# Patient Record
Sex: FEMALE | Race: WHITE | NOT HISPANIC OR LATINO | Employment: UNEMPLOYED | URBAN - METROPOLITAN AREA
[De-identification: names, ages, dates, MRNs, and addresses within clinical notes are randomized per-mention and may not be internally consistent; named-entity substitution may affect disease eponyms.]

---

## 2017-01-06 ENCOUNTER — ALLIED HEALTH/NURSE VISIT (OUTPATIENT)
Dept: FAMILY MEDICINE | Facility: OTHER | Age: 42
End: 2017-01-06
Payer: COMMERCIAL

## 2017-01-06 VITALS — SYSTOLIC BLOOD PRESSURE: 150 MMHG | DIASTOLIC BLOOD PRESSURE: 90 MMHG

## 2017-01-06 DIAGNOSIS — Z30.42 DEPO-PROVERA CONTRACEPTIVE STATUS: Primary | ICD-10-CM

## 2017-01-06 PROCEDURE — 99207 ZZC NO CHARGE NURSE ONLY: CPT

## 2017-01-06 PROCEDURE — 96372 THER/PROPH/DIAG INJ SC/IM: CPT

## 2017-01-06 RX ORDER — MEDROXYPROGESTERONE ACETATE 150 MG/ML
150 INJECTION, SUSPENSION INTRAMUSCULAR ONCE
Qty: 1 ML | Refills: 0 | OUTPATIENT
Start: 2017-01-06 | End: 2017-01-06

## 2017-01-06 NOTE — PROGRESS NOTES
BLOOD PRESSURE: 150/90    DATE OF LAST PAP or ANNUAL EXAM: No results found for this basename: pap  URINE HCG:not indicated    The following medication was given:     MEDICATION: Depo Provera 150mg  ROUTE: IM  SITE: Deltoid - Right per pt request   : [employer]  LOT #: [id number]  EXPIRATION:07/2019  NEXT INJECTION DUE: 03/24/2017-04/07/2017  Provider: Gisselle Linn Np      Verbal order for one time dose. Pt aware she needs to have follow up for further injections.       Prior to injection verified patient identity using patient's name and date of birth.  Per orders of Gisselle Linn NP, injection of Depo  given by Amy Jones. Patient instructed to remain in clinic for 20 minutes afterwards, and to report any adverse reaction to me immediately.      Amy Jones LPN

## 2017-02-01 NOTE — PROGRESS NOTES
SUBJECTIVE:                                                    Radha Beckwith is a 41 year old female who presents to clinic today for the following health issues:      Amitriptyline  Can't fall asleep, and if she does she can't stay asleep    Pt reports a lot of trouble with sleep.  She's having lots of anxiety and stress.    She's been changed from trazodone to amitriptyline.  The had dose increased.  Has had some scary dreams with this.  Doesn't feel like it's helping her to sleep, up frequently.  Tends to wake up every 30-60 minutes.  If she finally gets to sleep around 4 AM, will sleep to 10 AM.    Tries to go to bed between 9 and 10 PM.  Has some tomato and cucumber before going to bed.  Goes to bed with the TV on for 10-15 minutes.  Will then shut it off.  Denies using phone or laptop.  Denies doing other activities in bed.  Will then lie awake in bed most of the night.    Has tried melatonin, benadryl. Didn't feel these were very helpful.      Takes Reglan and omeprazole every morning.        Problem list and histories reviewed & adjusted, as indicated.  Additional history: as documented    Current Outpatient Prescriptions   Medication Sig Dispense Refill     ondansetron (ZOFRAN) 4 MG tablet Take 1 tablet (4 mg) by mouth every 6 hours as needed for nausea or vomiting 18 tablet 1     metoclopramide (REGLAN) 10 MG tablet TAKE ONE TABLET BY MOUTH THREE TIMES A DAY AS NEEDED 90 tablet 2     loratadine (CLARITIN) 10 MG tablet Take 10 mg by mouth daily       omeprazole 20 MG tablet Take 1 tablet (20 mg) by mouth 2 times daily Take 30-60 minutes before a meal. 60 tablet 10     ALPRAZolam (XANAX) 0.5 MG tablet   0     Recent Labs   Lab Test  10/02/15   0823  07/01/15   1807   06/23/15   1610  08/19/13   1205   ALT   --    --    --   32  41   CR  0.60  0.78   --   0.97  0.76   GFRESTIMATED  >90  Non  GFR Calc    81   --   63  85   GFRESTBLACK  >90   GFR Calc    >90    "American GFR Calc     --   76  >90   POTASSIUM  3.9  4.0   < >  2.6*  4.1    < > = values in this interval not displayed.        BP Readings from Last 3 Encounters:   02/03/17 150/84   01/06/17 150/90   11/23/16 132/78    Wt Readings from Last 3 Encounters:   02/03/17 148 lb 12.8 oz (67.495 kg)   11/23/16 144 lb 12.8 oz (65.681 kg)   10/14/16 137 lb 12.8 oz (62.506 kg)             BP elevated today.  Has been on diuretic previously that cause hypokalemia.  She took a red bull just before coming to clinic today.  Repeat BP was a bit better.        ROS:  Constitutional, HEENT, cardiovascular, pulmonary, gi and gu systems are negative, except as otherwise noted.  Lots of anxiety, poor sleep.    OBJECTIVE:                                                    /86 mmHg  Pulse 124  Temp(Src) 97.9  F (36.6  C) (Temporal)  Resp 12  Ht 5' 4\" (1.626 m)  Wt 148 lb 12.8 oz (67.495 kg)  BMI 25.53 kg/m2  Body mass index is 25.53 kg/(m^2).  GENERAL: healthy, alert and no distress  EYES: Eyes grossly normal to inspection, PERRL and conjunctivae and sclerae normal  HENT: normal cephalic/atraumatic, both ears: clear effusion, nose and mouth without ulcers or lesions, oropharynx clear and oral mucous membranes moist  NECK: no adenopathy, no asymmetry, masses, or scars and thyroid normal to palpation  RESP: lungs clear to auscultation - no rales, rhonchi or wheezes  CV: regular rate and rhythm, normal S1 S2, no S3 or S4, no murmur, click or rub, no peripheral edema and peripheral pulses strong  ABDOMEN: soft, nontender, no hepatosplenomegaly, no masses and bowel sounds normal  MS: no gross musculoskeletal defects noted, no edema    Diagnostic Test Results:  Results for orders placed or performed in visit on 08/01/16   *UA reflex to Microscopic and Culture ([facility] (except [place] and [place])   Result Value Ref Range    Color Urine Yellow     Appearance Urine Clear     Glucose Urine Negative " "NEG mg/dL    Bilirubin Urine Negative NEG    Ketones Urine Negative NEG mg/dL    Specific Gravity Urine 1.025 1.003 - 1.035    Blood Urine Negative NEG    pH Urine 6.0 5.0 - 7.0 pH    Protein Albumin Urine 30 (A) NEG mg/dL    Urobilinogen Urine 0.2 0.2 - 1.0 EU/dL    Nitrite Urine Negative NEG    Leukocyte Esterase Urine Trace (A) NEG    Source Unspecified Urine    Urine Microscopic   Result Value Ref Range    WBC Urine 10-25 (A) 0 - 2 /HPF    RBC Urine O - 2 0 - 2 /HPF    Squamous Epithelial /LPF Urine Few FEW /LPF    Bacteria Urine Few (A) NEG /HPF    Mucous Urine Present (A) NEG /LPF   Urine Culture Aerobic Bacterial   Result Value Ref Range    Specimen Description Unspecified Urine     Special Requests Specimen received in preservative     Culture Micro       >100,000 colonies/mL mixed urogenital ellis Susceptibility testing not routinely   done      Micro Report Status FINAL 08/03/2016         ASSESSMENT/PLAN:                                                      Tobacco Cessation:   reports that she has been smoking Cigarettes.  She has been smoking about 0.50 packs per day. She has never used smokeless tobacco.  Tobacco Cessation Action Plan: Pharmacotherapies : Chantix    BMI:   Estimated body mass index is 25.53 kg/(m^2) as calculated from the following:    Height as of this encounter: 5' 4\" (1.626 m).    Weight as of this encounter: 148 lb 12.8 oz (67.495 kg).   Weight management plan: Discussed healthy diet and exercise guidelines and patient will follow up in 6 months in clinic to re-evaluate.        ICD-10-CM    1. Psychophysiological insomnia F51.04 QUEtiapine (SEROQUEL) 50 MG tablet     Comprehensive metabolic panel     Lipid panel reflex to direct LDL     TSH with free T4 reflex     CANCELED: Comprehensive metabolic panel     CANCELED: Lipid Profile with reflex to direct LDL     CANCELED: TSH with free T4 reflex   2. Anxiety F41.9 QUEtiapine (SEROQUEL) 50 MG tablet     Comprehensive metabolic panel    "  Lipid panel reflex to direct LDL     TSH with free T4 reflex     CANCELED: Comprehensive metabolic panel     CANCELED: Lipid Profile with reflex to direct LDL     CANCELED: TSH with free T4 reflex   3. Tachycardia R00.0 Comprehensive metabolic panel     Lipid panel reflex to direct LDL     TSH with free T4 reflex     CANCELED: Comprehensive metabolic panel     CANCELED: Lipid Profile with reflex to direct LDL     CANCELED: TSH with free T4 reflex   4. Elevated blood pressure reading without diagnosis of hypertension R03.0 Comprehensive metabolic panel     Lipid panel reflex to direct LDL     TSH with free T4 reflex   5. Tobacco use disorder F17.200 TOBACCO CESSATION - FOR HEALTH MAINTENANCE     CANCELED: Comprehensive metabolic panel     CANCELED: Lipid Profile with reflex to direct LDL     CANCELED: TSH with free T4 reflex   6. Caffeine use F15.90      1.  Multifactorial.  Much of this is from her anxiety which isn't well-controlled.  Also stress with her mother.  Does have some poor sleep hygiene habits.  Encouraged pt to work on sleep hygiene.  In the interim, will try Seroquel as this is likely to help with both sleep an anxiety.  Follow up in 1-2 months to ensure this is improving.  2.  Not controlled.  She's not very interested in SSRIs, but will need to consider this if sleep continues to be an issue.  Will start Seroquel and consider further adjustment if not improving.  Follow up in 1-2 months.  3.  Secondary to #2 most likely as well sa #6.  Will check some labs to ensure that there's not an alternate organic cause for her symptoms.  4.  Likely secondary to caffeine and other issues noted here.  Recheck in a few weeks.  5.  Discussed options for cessation.  Pt interested in Chantix, still has some at home.  Discussed how to use it, what to watch for.  F/u in 1-2 months.  6.  See above.  Encouraged her to limit this somewhat.            Patient Instructions   Thank you for visiting [facility]  [place]    Let's try Seroquel for sleep and mood.  Let me know if problems.    When you decide to quit smoking, start taking Chantix about a week prior to stopping.  If this is working, let me know, and we'll get you a refill of the continuing pack.      We'll let you know your lab results as soon as we can.     Your blood pressure was high today.  Please come back in 1-4 weeks for a nurse visit blood pressure check.     Please make an appointment with your either myself or your provider  in 1  month for follow up on these issues.     You are also due for a pap, mammogram, etc.  Please schedule this ASAP.    If you had imaging scheduled please refer to your radiology prep sheet.    Appointment    Date_______________     Time_____________    Day:   M TU W TH F    With____________________________    Location_________________________    If you need medication refills, please contact your pharmacy 3 days before your prescriptions runs out. If you are out of refills, your pharmacy will contact contact the clinic.    Contact us or return if questions or concerns.     -Your Care Team:  MD Peyton Casarez PA-C Folake Falaki, MD Anoshirvan Mazhari, MD Kelly White, JEN    General information about your clinic      Clinic hours:     Lab hours:  Phone [phone]  Monday 7:30 am-7 pm    Monday 8:30 am-6:30 pm  Tuesday-Friday 7:30 am-5 pm   Tuesday-Friday 8:30 am-4:30 pm    Pharmacy hours:  Phone [phone]  Monday 8:30 am-7pm  Tuesday-Friday 8:30am-6 pm                                       [employer] assistance [phone]        We would like to hear from you, how was your visit today?    Angela Jacinto  Patient Information Supervisor   Patient Care Supervisor  [place] [place], and [place] [facility] [place], and [facility]  [phone]           Clemente Chino MD, MD  [facility]

## 2017-02-03 ENCOUNTER — OFFICE VISIT (OUTPATIENT)
Dept: FAMILY MEDICINE | Facility: OTHER | Age: 42
End: 2017-02-03
Payer: COMMERCIAL

## 2017-02-03 VITALS
DIASTOLIC BLOOD PRESSURE: 86 MMHG | HEART RATE: 124 BPM | BODY MASS INDEX: 25.4 KG/M2 | RESPIRATION RATE: 12 BRPM | WEIGHT: 148.8 LBS | SYSTOLIC BLOOD PRESSURE: 142 MMHG | HEIGHT: 64 IN | TEMPERATURE: 97.9 F

## 2017-02-03 DIAGNOSIS — F41.9 ANXIETY: ICD-10-CM

## 2017-02-03 DIAGNOSIS — Z78.9 CAFFEINE USE: ICD-10-CM

## 2017-02-03 DIAGNOSIS — R00.0 TACHYCARDIA: ICD-10-CM

## 2017-02-03 DIAGNOSIS — F51.04 PSYCHOPHYSIOLOGICAL INSOMNIA: Primary | ICD-10-CM

## 2017-02-03 DIAGNOSIS — F17.200 TOBACCO USE DISORDER: ICD-10-CM

## 2017-02-03 DIAGNOSIS — R03.0 ELEVATED BLOOD PRESSURE READING WITHOUT DIAGNOSIS OF HYPERTENSION: ICD-10-CM

## 2017-02-03 PROCEDURE — 99214 OFFICE O/P EST MOD 30 MIN: CPT | Performed by: FAMILY MEDICINE

## 2017-02-03 RX ORDER — QUETIAPINE FUMARATE 50 MG/1
50-100 TABLET, FILM COATED ORAL
Qty: 60 TABLET | Refills: 1 | Status: SHIPPED | OUTPATIENT
Start: 2017-02-03 | End: 2017-03-30

## 2017-02-03 RX ORDER — ALPRAZOLAM 0.5 MG
TABLET ORAL
Refills: 0 | COMMUNITY
Start: 2016-10-14 | End: 2017-03-30

## 2017-02-03 ASSESSMENT — ANXIETY QUESTIONNAIRES
1. FEELING NERVOUS, ANXIOUS, OR ON EDGE: MORE THAN HALF THE DAYS
3. WORRYING TOO MUCH ABOUT DIFFERENT THINGS: MORE THAN HALF THE DAYS
IF YOU CHECKED OFF ANY PROBLEMS ON THIS QUESTIONNAIRE, HOW DIFFICULT HAVE THESE PROBLEMS MADE IT FOR YOU TO DO YOUR WORK, TAKE CARE OF THINGS AT HOME, OR GET ALONG WITH OTHER PEOPLE: SOMEWHAT DIFFICULT
2. NOT BEING ABLE TO STOP OR CONTROL WORRYING: SEVERAL DAYS
5. BEING SO RESTLESS THAT IT IS HARD TO SIT STILL: NOT AT ALL
GAD7 TOTAL SCORE: 10
2. NOT BEING ABLE TO STOP OR CONTROL WORRYING: SEVERAL DAYS
IF YOU CHECKED OFF ANY PROBLEMS ON THIS QUESTIONNAIRE, HOW DIFFICULT HAVE THESE PROBLEMS MADE IT FOR YOU TO DO YOUR WORK, TAKE CARE OF THINGS AT HOME, OR GET ALONG WITH OTHER PEOPLE: SOMEWHAT DIFFICULT
5. BEING SO RESTLESS THAT IT IS HARD TO SIT STILL: NOT AT ALL
6. BECOMING EASILY ANNOYED OR IRRITABLE: NEARLY EVERY DAY
6. BECOMING EASILY ANNOYED OR IRRITABLE: NEARLY EVERY DAY
3. WORRYING TOO MUCH ABOUT DIFFERENT THINGS: MORE THAN HALF THE DAYS
7. FEELING AFRAID AS IF SOMETHING AWFUL MIGHT HAPPEN: SEVERAL DAYS
1. FEELING NERVOUS, ANXIOUS, OR ON EDGE: MORE THAN HALF THE DAYS
7. FEELING AFRAID AS IF SOMETHING AWFUL MIGHT HAPPEN: SEVERAL DAYS
GAD7 TOTAL SCORE: 10

## 2017-02-03 ASSESSMENT — PATIENT HEALTH QUESTIONNAIRE - PHQ9
5. POOR APPETITE OR OVEREATING: SEVERAL DAYS
5. POOR APPETITE OR OVEREATING: SEVERAL DAYS

## 2017-02-03 ASSESSMENT — PAIN SCALES - GENERAL: PAINLEVEL: NO PAIN (0)

## 2017-02-03 NOTE — NURSING NOTE
"Chief Complaint   Patient presents with     Sleep Problem     Panel Management     MyChart, Pap, Mammo, Tobacco cessation, Lipids, phq, aletha, BMP       Initial /84 mmHg  Pulse 124  Temp(Src) 97.9  F (36.6  C) (Temporal)  Resp 12 Estimated body mass index is 23.84 kg/(m^2) as calculated from the following:    Height as of 11/23/16: 5' 5.35\" (1.66 m).    Weight as of 11/23/16: 144 lb 12.8 oz (65.681 kg).  BP completed using cuff size: jacquelin Malik, LYSSA    "

## 2017-02-03 NOTE — NURSING NOTE
"Chief Complaint   Patient presents with     Sleep Problem     Panel Management     MyChart, Pap, Mammo, Tobacco cessation, Lipids, phq, aletha, BMP       Initial /84 mmHg  Pulse 124  Temp(Src) 97.9  F (36.6  C) (Temporal)  Resp 12  Ht 5' 4\" (1.626 m)  Wt 148 lb 12.8 oz (67.495 kg)  BMI 25.53 kg/m2 Estimated body mass index is 25.53 kg/(m^2) as calculated from the following:    Height as of this encounter: 5' 4\" (1.626 m).    Weight as of this encounter: 148 lb 12.8 oz (67.495 kg).  BP completed using cuff size: jacquelin Malik CMA    "

## 2017-02-03 NOTE — MR AVS SNAPSHOT
After Visit Summary   2/3/2017    Radha Beckwith    MRN: [id number]           Patient Information     Date Of Birth          1975        Visit Information        Provider Department      2/3/2017 2:30 PM Clemente Chino MD [facility]        Today's Diagnoses     Psychophysiological insomnia    -  1     Anxiety         Tachycardia         Elevated blood pressure reading without diagnosis of hypertension         Tobacco use disorder           Care Instructions    Thank you for visiting [facility]    Let's try Seroquel for sleep and mood.  Let me know if problems.    When you decide to quit smoking, start taking Chantix about a week prior to stopping.  If this is working, let me know, and we'll get you a refill of the continuing pack.      We'll let you know your lab results as soon as we can.     Your blood pressure was high today.  Please come back in 1-4 weeks for a nurse visit blood pressure check.     Please make an appointment with your either myself or your provider  in 1  month for follow up on these issues.     You are also due for a pap, mammogram, etc.  Please schedule this ASAP.    If you had imaging scheduled please refer to your radiology prep sheet.    Appointment    Date_______________     Time_____________    Day:   M TU W TH F    With____________________________    Location_________________________    If you need medication refills, please contact your pharmacy 3 days before your prescriptions runs out. If you are out of refills, your pharmacy will contact contact the clinic.    Contact us or return if questions or concerns.     -Your Care Team:  MD Peyton Casarez PA-C Folake Falaki, MD Anoshirvan Mazhari, MD Kelly White, JEN    General information about your clinic      Clinic hours:     Lab hours:  Phone [phone]  Monday 7:30 am-7 pm    Monday 8:30 am-6:30 pm  Tuesday-Friday 7:30 am-5  "pm   Tuesday-Friday 8:30 am-4:30 pm    Pharmacy hours:  Phone [phone]  Monday 8:30 am-7pm  Tuesday-Friday 8:30am-6 pm                                       [place] assistance [phone]        We would like to hear from you, how was your visit today?    Angela Jacinto  Patient Information Supervisor   Patient Care Supervisor  [facility] [place], and hospitals, and [facility]  [phone]           Follow-ups after your visit        Who to contact     If you have questions or need follow up information about today's clinic visit or your schedule please contact [facility] directly at [phone].  Normal or non-critical lab and imaging results will be communicated to you by [employer], letter or phone within 4 business days after the clinic has received the results. If you do not hear from us within 7 days, please contact the clinic through [employer]IM[employer] or phone. If you have a critical or abnormal lab result, we will notify you by phone as soon as possible.  Submit refill requests through [employer] or call your pharmacy and they will forward the refill request to us. Please allow 3 business days for your refill to be completed.          Additional Information About Your Visit        NIMBO[employer]hart Information     [employer] lets you send messages to your doctor, view your test results, renew your prescriptions, schedule appointments and more. To sign up, go to [email or web address] . Click on \"Log in\" on the left side of the screen, which will take you to the Welcome page. Then click on \"Sign up Now\" on the right side of the page.     You will be asked to enter the access code listed below, as well as some personal information. Please follow the directions to create your username and password.     Your access code is: [email or web address]  Expires: 2017  3:22 PM     Your access code will  in 90 days. If you need help or a new code, " "please call your [facility] clinic or [phone].        Care EveryWhere ID     This is your Care EveryWhere ID. This could be used by other organizations to access your [facility] medical records  [id number]        Your Vitals Were     Pulse Temperature Respirations Height BMI (Body Mass Index)       124 97.9  F (36.6  C) (Temporal) 12 5' 4\" (1.626 m) 25.53 kg/m2        Blood Pressure from Last 3 Encounters:   02/03/17 142/86   01/06/17 150/90   11/23/16 132/78    Weight from Last 3 Encounters:   02/03/17 148 lb 12.8 oz (67.495 kg)   11/23/16 144 lb 12.8 oz (65.681 kg)   10/14/16 137 lb 12.8 oz (62.506 kg)              We Performed the Following     Comprehensive metabolic panel     Lipid Profile with reflex to direct LDL     TOBACCO CESSATION - FOR HEALTH MAINTENANCE     TSH with free T4 reflex          Today's Medication Changes          These changes are accurate as of: 2/3/17  3:22 PM.  If you have any questions, ask your nurse or doctor.               Start taking these medicines.        Dose/Directions    QUEtiapine 50 MG tablet   Commonly known as:  SEROQUEL   Used for:  Anxiety, Psychophysiological insomnia   Started by:  Clemente Chino MD        Dose:   mg   Take 1-2 tablets ( mg) by mouth nightly as needed   Quantity:  60 tablet   Refills:  1         Stop taking these medicines if you haven't already. Please contact your care team if you have questions.     amitriptyline 25 MG tablet   Commonly known as:  ELAVIL   Stopped by:  Clemente Chino MD                Where to get your medicines      These medications were sent to [facility] - [place] - [address]   [address] [place] [address]     Phone:  [phone]    - QUEtiapine 50 MG tablet             Primary Care Provider Office Phone # Fax #    Donny De Anda [phone]       [facility] [address] [facility] [address]        Thank you!     Thank you " for choosing [facility]  for your care. Our goal is always to provide you with excellent care. Hearing back from our patients is one way we can continue to improve our services. Please take a few minutes to complete the written survey that you may receive in the mail after your visit with us. Thank you!             Your Updated Medication List - Protect others around you: Learn how to safely use, store and throw away your medicines at www.disposemymeds.org.          This list is accurate as of: 2/3/17  3:22 PM.  Always use your most recent med list.                   Brand Name Dispense Instructions for use    ALPRAZolam 0.5 MG tablet    XANAX         loratadine 10 MG tablet    CLARITIN     Take 10 mg by mouth daily       metoclopramide 10 MG tablet    REGLAN    90 tablet    TAKE ONE TABLET BY MOUTH THREE TIMES A DAY AS NEEDED       omeprazole 20 MG tablet     60 tablet    Take 1 tablet (20 mg) by mouth 2 times daily Take 30-60 minutes before a meal.       ondansetron 4 MG tablet    ZOFRAN    18 tablet    Take 1 tablet (4 mg) by mouth every 6 hours as needed for nausea or vomiting       QUEtiapine 50 MG tablet    SEROQUEL    60 tablet    Take 1-2 tablets ( mg) by mouth nightly as needed

## 2017-02-03 NOTE — PATIENT INSTRUCTIONS
Thank you for visiting [facility]    Let's try Seroquel for sleep and mood.  Let me know if problems.    When you decide to quit smoking, start taking Chantix about a week prior to stopping.  If this is working, let me know, and we'll get you a refill of the continuing pack.      We'll let you know your lab results as soon as we can.     Your blood pressure was high today.  Please come back in 1-4 weeks for a nurse visit blood pressure check.     Please make an appointment with your either myself or your provider  in 1  month for follow up on these issues.     You are also due for a pap, mammogram, etc.  Please schedule this ASAP.    If you had imaging scheduled please refer to your radiology prep sheet.    Appointment    Date_______________     Time_____________    Day:   M TU W TH F    With____________________________    Location_________________________    If you need medication refills, please contact your pharmacy 3 days before your prescriptions runs out. If you are out of refills, your pharmacy will contact contact the clinic.    Contact us or return if questions or concerns.     -Your Care Team:  MD Peyton Casarez PA-C Folake Falaki, MD Anoshirvan Mazhari, MD Kelly White, JEN    General information about your clinic      Clinic hours:     Lab hours:  Phone [phone]  Monday 7:30 am-7 pm    Monday 8:30 am-6:30 pm  Tuesday-Friday 7:30 am-5 pm   Tuesday-Friday 8:30 am-4:30 pm    Pharmacy hours:  Phone [phone]  Monday 8:30 am-7pm  Tuesday-Friday 8:30am-6 pm                                       [place] assistance [phone]        We would like to hear from you, how was your visit today?    Angela Jacinto  Patient Information Supervisor   Patient Care Supervisor  [facility] [place], and [facility], and [facility]  [phone]

## 2017-02-04 ASSESSMENT — ANXIETY QUESTIONNAIRES: GAD7 TOTAL SCORE: 10

## 2017-02-04 ASSESSMENT — PATIENT HEALTH QUESTIONNAIRE - PHQ9: SUM OF ALL RESPONSES TO PHQ QUESTIONS 1-9: 8

## 2017-03-13 DIAGNOSIS — F41.0 ANXIETY ATTACK: ICD-10-CM

## 2017-03-13 RX ORDER — ONDANSETRON 4 MG/1
TABLET, FILM COATED ORAL
Qty: 18 TABLET | Refills: 0 | Status: SHIPPED | OUTPATIENT
Start: 2017-03-13 | End: 2017-04-20

## 2017-03-13 NOTE — TELEPHONE ENCOUNTER
ondansetron (ZOFRAN) 4 MG tablet      Last Written Prescription Date:  12/28/16  Last Fill Quantity: 18 tablets,   # refills: 1  Last Office Visit with [facility], [facility] or [facility] prescribing provider: 2/3/17 velez  Future Office visit:       Routing refill request to provider for review/approval because:  Drug not on the [facility], [facility] or [facility] refill protocol or controlled substance

## 2017-03-14 NOTE — TELEPHONE ENCOUNTER
Informed patient and she states when she receives her work schedule next week she will call back to schedule follow up.    Lucy Paris MA

## 2017-03-14 NOTE — TELEPHONE ENCOUNTER
Pt was asked to follow up this month on her anxiety and other symptoms.  Please encourage her to make appointment.

## 2017-03-15 ENCOUNTER — TELEPHONE (OUTPATIENT)
Dept: FAMILY MEDICINE | Facility: OTHER | Age: 42
End: 2017-03-15

## 2017-03-15 NOTE — TELEPHONE ENCOUNTER
Summary:    Patient is due/failing the following:   BMP, LDL, MAMMOGRAM and PAP    Action needed:   Patient needs office visit for PAP., Patient needs fasting lab only appointment and schedule a mammogram     Type of outreach:    Phone, spoke to patient.  patient scheduled appointment in [facility] for provider review:    None                                                                                                                                    Izabela Sebastian       Chart routed to Care Team .        Panel Management Review      Patient has the following on her problem list:     Hypertension   Last three blood pressure readings:  BP Readings from Last 3 Encounters:   02/03/17 142/86   01/06/17 150/90   11/23/16 132/78     Blood pressure: FAILED    HTN Guidelines:  Age 18-59 BP range:  Less than 140/90  Age 60-85 with Diabetes:  Less than 140/90  Age 60-85 without Diabetes:  less than 150/90      Composite cancer screening  Chart review shows that this patient is due/due soon for the following Pap Smear and Mammogram

## 2017-03-16 ENCOUNTER — TELEPHONE (OUTPATIENT)
Dept: FAMILY MEDICINE | Facility: OTHER | Age: 42
End: 2017-03-16

## 2017-03-16 NOTE — TELEPHONE ENCOUNTER
Please change medication or advise of any information that might help get this approved.    Thanks    Received PA request for: omeprazole bid   Insurance co: [employer]  Insurance co phone: [phone]  Member ID#: [id number]    PA filled out and faxed awaiting approval/denial

## 2017-03-24 ENCOUNTER — APPOINTMENT (OUTPATIENT)
Dept: ULTRASOUND IMAGING | Facility: CLINIC | Age: 42
End: 2017-03-24
Attending: FAMILY MEDICINE
Payer: COMMERCIAL

## 2017-03-24 ENCOUNTER — APPOINTMENT (OUTPATIENT)
Dept: CT IMAGING | Facility: CLINIC | Age: 42
End: 2017-03-24
Attending: FAMILY MEDICINE
Payer: COMMERCIAL

## 2017-03-24 ENCOUNTER — HOSPITAL ENCOUNTER (EMERGENCY)
Facility: CLINIC | Age: 42
Discharge: HOME | End: 2017-03-24
Attending: FAMILY MEDICINE | Admitting: FAMILY MEDICINE
Payer: COMMERCIAL

## 2017-03-24 VITALS
RESPIRATION RATE: 16 BRPM | BODY MASS INDEX: 25.54 KG/M2 | SYSTOLIC BLOOD PRESSURE: 121 MMHG | WEIGHT: 148.81 LBS | TEMPERATURE: 98.5 F | DIASTOLIC BLOOD PRESSURE: 76 MMHG | OXYGEN SATURATION: 97 %

## 2017-03-24 DIAGNOSIS — F43.22 ADJUSTMENT DISORDER WITH ANXIOUS MOOD: ICD-10-CM

## 2017-03-24 DIAGNOSIS — K21.9 GASTROESOPHAGEAL REFLUX DISEASE WITHOUT ESOPHAGITIS: ICD-10-CM

## 2017-03-24 LAB
ALBUMIN SERPL-MCNC: 3.6 G/DL (ref 3.4–5)
ALP SERPL-CCNC: 67 U/L (ref 40–150)
ALT SERPL W P-5'-P-CCNC: 35 U/L (ref 0–50)
ANION GAP SERPL CALCULATED.3IONS-SCNC: 10 MMOL/L (ref 3–14)
APTT PPP: 24 SEC (ref 22–37)
AST SERPL W P-5'-P-CCNC: 22 U/L (ref 0–45)
BASOPHILS # BLD AUTO: 0 10E9/L (ref 0–0.2)
BASOPHILS NFR BLD AUTO: 0.4 %
BILIRUB DIRECT SERPL-MCNC: <0.1 MG/DL (ref 0–0.2)
BILIRUB SERPL-MCNC: 0.3 MG/DL (ref 0.2–1.3)
BUN SERPL-MCNC: 8 MG/DL (ref 7–30)
CALCIUM SERPL-MCNC: 8.9 MG/DL (ref 8.5–10.1)
CHLORIDE SERPL-SCNC: 110 MMOL/L (ref 94–109)
CO2 SERPL-SCNC: 24 MMOL/L (ref 20–32)
CREAT SERPL-MCNC: 0.68 MG/DL (ref 0.52–1.04)
DIFFERENTIAL METHOD BLD: NORMAL
EOSINOPHIL # BLD AUTO: 0.2 10E9/L (ref 0–0.7)
EOSINOPHIL NFR BLD AUTO: 2.2 %
ERYTHROCYTE [DISTWIDTH] IN BLOOD BY AUTOMATED COUNT: 12.7 % (ref 10–15)
ETHANOL SERPL-MCNC: <0.01 G/DL
GFR SERPL CREATININE-BSD FRML MDRD: ABNORMAL ML/MIN/1.7M2
GLUCOSE SERPL-MCNC: 127 MG/DL (ref 70–99)
HCT VFR BLD AUTO: 42.9 % (ref 35–47)
HGB BLD-MCNC: 14.5 G/DL (ref 11.7–15.7)
IMM GRANULOCYTES # BLD: 0 10E9/L (ref 0–0.4)
IMM GRANULOCYTES NFR BLD: 0.3 %
INR PPP: 0.87 (ref 0.86–1.14)
LIPASE SERPL-CCNC: 143 U/L (ref 73–393)
LYMPHOCYTES # BLD AUTO: 1.9 10E9/L (ref 0.8–5.3)
LYMPHOCYTES NFR BLD AUTO: 28.4 %
MCH RBC QN AUTO: 32.2 PG (ref 26.5–33)
MCHC RBC AUTO-ENTMCNC: 33.8 G/DL (ref 31.5–36.5)
MCV RBC AUTO: 95 FL (ref 78–100)
MONOCYTES # BLD AUTO: 0.4 10E9/L (ref 0–1.3)
MONOCYTES NFR BLD AUTO: 5.8 %
NEUTROPHILS # BLD AUTO: 4.3 10E9/L (ref 1.6–8.3)
NEUTROPHILS NFR BLD AUTO: 62.9 %
PLATELET # BLD AUTO: 226 10E9/L (ref 150–450)
POTASSIUM SERPL-SCNC: 3.8 MMOL/L (ref 3.4–5.3)
PROT SERPL-MCNC: 7.4 G/DL (ref 6.8–8.8)
RBC # BLD AUTO: 4.51 10E12/L (ref 3.8–5.2)
SODIUM SERPL-SCNC: 144 MMOL/L (ref 133–144)
TROPONIN I SERPL-MCNC: NORMAL UG/L (ref 0–0.04)
WBC # BLD AUTO: 6.8 10E9/L (ref 4–11)

## 2017-03-24 PROCEDURE — 93005 ELECTROCARDIOGRAM TRACING: CPT

## 2017-03-24 PROCEDURE — 85610 PROTHROMBIN TIME: CPT | Performed by: FAMILY MEDICINE

## 2017-03-24 PROCEDURE — 80320 DRUG SCREEN QUANTALCOHOLS: CPT | Performed by: FAMILY MEDICINE

## 2017-03-24 PROCEDURE — 99284 EMERGENCY DEPT VISIT MOD MDM: CPT | Performed by: FAMILY MEDICINE

## 2017-03-24 PROCEDURE — 99285 EMERGENCY DEPT VISIT HI MDM: CPT | Mod: 25

## 2017-03-24 PROCEDURE — 76705 ECHO EXAM OF ABDOMEN: CPT

## 2017-03-24 PROCEDURE — 83690 ASSAY OF LIPASE: CPT | Performed by: FAMILY MEDICINE

## 2017-03-24 PROCEDURE — 96360 HYDRATION IV INFUSION INIT: CPT

## 2017-03-24 PROCEDURE — 80076 HEPATIC FUNCTION PANEL: CPT | Performed by: FAMILY MEDICINE

## 2017-03-24 PROCEDURE — 25000125 ZZHC RX 250: Performed by: FAMILY MEDICINE

## 2017-03-24 PROCEDURE — 25000132 ZZH RX MED GY IP 250 OP 250 PS 637: Performed by: FAMILY MEDICINE

## 2017-03-24 PROCEDURE — 74177 CT ABD & PELVIS W/CONTRAST: CPT

## 2017-03-24 PROCEDURE — 85730 THROMBOPLASTIN TIME PARTIAL: CPT | Performed by: FAMILY MEDICINE

## 2017-03-24 PROCEDURE — 80048 BASIC METABOLIC PNL TOTAL CA: CPT | Performed by: FAMILY MEDICINE

## 2017-03-24 PROCEDURE — 25000128 H RX IP 250 OP 636: Performed by: FAMILY MEDICINE

## 2017-03-24 PROCEDURE — 84484 ASSAY OF TROPONIN QUANT: CPT | Performed by: FAMILY MEDICINE

## 2017-03-24 PROCEDURE — 25500064 ZZH RX 255 OP 636: Performed by: FAMILY MEDICINE

## 2017-03-24 PROCEDURE — 85025 COMPLETE CBC W/AUTO DIFF WBC: CPT | Performed by: FAMILY MEDICINE

## 2017-03-24 RX ORDER — SODIUM CHLORIDE 9 MG/ML
1000 INJECTION, SOLUTION INTRAVENOUS CONTINUOUS
Status: DISCONTINUED | OUTPATIENT
Start: 2017-03-24 | End: 2017-03-24 | Stop reason: HOSPADM

## 2017-03-24 RX ORDER — SUCRALFATE 1 G/1
1 TABLET ORAL 4 TIMES DAILY
Qty: 40 TABLET | Refills: 1 | Status: SHIPPED | OUTPATIENT
Start: 2017-03-24 | End: 2017-03-30

## 2017-03-24 RX ORDER — LIDOCAINE 40 MG/G
CREAM TOPICAL
Status: DISCONTINUED | OUTPATIENT
Start: 2017-03-24 | End: 2017-03-24 | Stop reason: HOSPADM

## 2017-03-24 RX ORDER — LORAZEPAM 1 MG/1
0.5 TABLET ORAL EVERY 8 HOURS PRN
Qty: 4 TABLET | Refills: 0 | Status: SHIPPED | OUTPATIENT
Start: 2017-03-24 | End: 2017-03-30

## 2017-03-24 RX ORDER — IOPAMIDOL 755 MG/ML
500 INJECTION, SOLUTION INTRAVASCULAR ONCE
Status: COMPLETED | OUTPATIENT
Start: 2017-03-24 | End: 2017-03-24

## 2017-03-24 RX ADMIN — SODIUM CHLORIDE 61 ML: 9 INJECTION, SOLUTION INTRAVENOUS at 09:21

## 2017-03-24 RX ADMIN — IOPAMIDOL 72 ML: 755 INJECTION, SOLUTION INTRAVENOUS at 09:22

## 2017-03-24 RX ADMIN — SODIUM CHLORIDE 1000 ML: 9 INJECTION, SOLUTION INTRAVENOUS at 08:04

## 2017-03-24 RX ADMIN — LIDOCAINE HYDROCHLORIDE 30 ML: 20 SOLUTION ORAL; TOPICAL at 08:05

## 2017-03-24 NOTE — ED AVS SNAPSHOT
[facility] Emergency Department    [address] [place] [address]    Phone:  [phone]    Fax:  [phone]                                       Radha Beckwith   MRN: [id number]    Department:  [facility] Emergency Department   Date of Visit:  3/24/2017           Patient Information     Date Of Birth          1975        Your diagnoses for this visit were:     Gastroesophageal reflux disease without esophagitis     Adjustment disorder with anxious mood        You were seen by Dino Contreras MD.      Follow-up Information     Follow up with Clemente Chino MD. Schedule an appointment as soon as possible for a visit in 1 week.    Specialty:  Family Practice    Why:  For follow up on your ED stay    Contact information:    [facility]  [address] [place] [id number] [phone]          Discharge Instructions         Medications for Acid Reflux  Your health care provider has told you that you have acid reflux. This is a condition that causes stomach acid to wash up into your throat. For most people, acid reflux is troubling but not dangerous. However, left untreated, acid reflux sometimes damages the esophagus. Medications can help control acid reflux and limit your risk of future problems.  Medications for Acid Reflux  Your health care provider may prescribe medication to help treat your acid reflux. Medication will be based on your symptoms and the results of any tests. Your health care provider will explain how to take your medication. You will also be told about possible side effects.  Reducing Stomach Acid  Your doctor may suggest antacids that you can buy over the counter. Antacids can give fast relief. Or you may be told to take a type of medication called H2 blockers. These are available over the counter and by prescription (for higher doses).  Blocking Stomach Acid  In more severe cases, your doctor may suggest stronger medications  such as proton pump inhibitors (PPIs). These keep the stomach from making acid. They are often prescribed for long-term use.  Other Medications  If medications to reduce or block stomach acid don t work, you may be switched to another type of medication that helps the stomach empty better.    4378-9760 [employer]. [address] [place] [address]. All rights reserved. This information is not intended as a substitute for professional medical care. Always follow your healthcare professional's instructions.          Tips to Control Acid Reflux  To control acid reflux, you ll need to make some basic diet and lifestyle changes. The simple steps outlined below may be all you ll need to relieve discomfort.  Watch What You Eat      Avoid fatty foods and spicy foods.    Eat fewer acidic foods, such as citrus and tomato-based foods. These can increase symptoms.    Limit drinking alcohol, caffeine, and fizzy beverages. All increase acid reflux.    Try limiting chocolate, peppermint, and spearmint. These can worsen acid reflux in some people.  Watch When You Eat    Avoid lying down for 3 hours after eating.    Do not snack before going to bed.  Raise Your Head    Raising your head and upper body by 4 inches to 6 inches helps limit reflux when you re lying down. Put blocks under the head of the bed frame to raise it.  Other Changes    Lose weight, if you need to.    Don t work out near bedtime.    Avoid tight-fitting clothes.    Limit aspirin and ibuprofen.    Stop smoking.     5332-6076 [employer]. [address] [place] [address]. All rights reserved. This information is not intended as a substitute for professional medical care. Always follow your healthcare professional's instructions.          Adjustment Disorder  Life changes -- work, family, parents, children -- each can cause a great deal of stress in life. An adjustment disorder means you have trouble dealing with this  change and stress. This problem can have serious results. You may feel helpless, depressed, make bad decisions, or even feel like you want to hurt yourself.  Adjustment disorder can cause anxiety or depression. It is triggered by daily stresses such as:    Death of a loved one    Divorce    Marriage    General life changes such as changing or leaving a job    Moving    Illness or other health issue for you or a family  member    Sex    Money     Symptoms may include:    Sadness, crying    Anxiety    Insomnia    Poor concentration    Trouble doing simple things    New problems at work or with family or friends    Loss of self-esteem    Sense of hopelessness    Feeling trapped or cut off from others  With this condition, it is common to feel sad, guilty, hopeless and restless. These feelings may continue for weeks or months. It can be helpful to identify what is causing the additional stress and take steps to get extra support. If new stressful events do not occur, it is likely that you will gradually start feeling better.  Home care    If you have been given a prescription for medicine, take it as directed.    It helps to talk about your feelings and thoughts with family or friends that understand and support you.  Follow-up care  Follow up with your healthcare provider, or therapist as advised. Let them know if this condition does not improve or gets worse.  When to seek medical advice  Call your healthcare provider right away if any of these occur:    Worsening depression or anxiety    Feeling out of control    Thoughts of harming yourself or another    Being unable to care for yourself    3313-3674 [employer]. [address] [place] [address], [place] [address]. All rights reserved. This information is not intended as a substitute for professional medical care. Always follow your healthcare professional's instructions.          Future Appointments        Provider Department Dept Phone Center    3/30/2017 2:00  PM Clemente Chino MD, MD [facility] [phone] [facility]    4/5/2017 10:10 AM Michael Chandler MD [facility] [phone] [facility]      24 Hour Appointment Hotline       To make an appointment at [facility], call [phone] ([phone]). If you don't have a family doctor or clinic, we will help you find one. [facility] are conveniently located to serve the needs of you and your family.          ED Discharge Orders     GASTROENTEROLOGY ADULT REF PROCEDURE ONLY       RESULTS TO PCP:  DR.DAN CHINO    Last Lab Result: Creatinine (mg/dL)       Date                     Value                 03/24/2017               0.68             ----------  Body mass index is 25.54 kg/(m^2).     Needed:  No  Language:  English    Patient will be contacted to schedule procedure.     Please be aware that coverage of these services is subject to the terms and limitations of your health insurance plan.  Call member services at your health plan with any benefit or coverage questions.  Any procedures must be performed at a [facility] facility OR coordinated by your clinic's referral office.    Please bring the following with you to your appointment:    (1) Any X-Rays, CTs or MRIs which have been performed.  Contact the facility where they were done to arrange for  prior to your scheduled appointment.    (2) List of current medications   (3) This referral request   (4) Any documents/labs given to you for this referral                     Review of your medicines      START taking        Dose / Directions Last dose taken    LORazepam 1 MG tablet   Commonly known as:  ATIVAN   Dose:  0.5 mg   Quantity:  4 tablet        Take 0.5 tablets (0.5 mg) by mouth every 8 hours as needed for anxiety   Refills:  0        sucralfate 1 GM tablet   Commonly known as:  CARAFATE   Dose:  1 g   Quantity:  40 tablet        Take 1 tablet (1 g) by mouth 4 times daily    Refills:  1          Our records show that you are taking the medicines listed below. If these are incorrect, please call your family doctor or clinic.        Dose / Directions Last dose taken    ALPRAZolam 0.5 MG tablet   Commonly known as:  XANAX        Refills:  0        loratadine 10 MG tablet   Commonly known as:  CLARITIN   Dose:  10 mg        Take 10 mg by mouth daily   Refills:  0        metoclopramide 10 MG tablet   Commonly known as:  REGLAN   Quantity:  90 tablet        TAKE ONE TABLET BY MOUTH THREE TIMES A DAY AS NEEDED   Refills:  2        omeprazole 20 MG tablet   Dose:  20 mg   Quantity:  60 tablet        Take 1 tablet (20 mg) by mouth 2 times daily Take 30-60 minutes before a meal.   Refills:  10        ondansetron 4 MG tablet   Commonly known as:  ZOFRAN   Quantity:  18 tablet        TAKE ONE TABLET BY MOUTH EVERY 6 HOURS AS NEEDED FOR NAUSEA OR VOMITING   Refills:  0        QUEtiapine 50 MG tablet   Commonly known as:  SEROQUEL   Dose:   mg   Quantity:  60 tablet        Take 1-2 tablets ( mg) by mouth nightly as needed   Refills:  1                Prescriptions were sent or printed at these locations (2 Prescriptions)                   [facility] [place], [place] - [address]    [address] [facility] [address]    Telephone:  [phone]   Fax:  [phone]   Hours:                  E-Prescribed (1 of 2)         sucralfate (CARAFATE) 1 GM tablet                 Printed at Department/Unit printer (1 of 2)         LORazepam (ATIVAN) 1 MG tablet                Procedures and tests performed during your visit     Alcohol ethyl    Basic metabolic panel    CBC with platelets differential    CT Abdomen Pelvis w Contrast    EKG 12-lead, tracing only    Hepatic panel    INR    Lipase    Partial thromboplastin time    Peripheral IV catheter    Troponin I    US Abdomen Limited      Orders Needing Specimen Collection     None      Pending Results     Date and Time  "Order Name Status Description    3/24/2017 0842 CT Abdomen Pelvis w Contrast Preliminary     3/24/2017 0749 US Abdomen Limited Preliminary             Pending Culture Results     No orders found from 3/22/2017 to 3/25/2017.            Thank you for choosing [facility]       Thank you for choosing [facility] for your care. Our goal is always to provide you with excellent care. Hearing back from our patients is one way we can continue to improve our services. Please take a few minutes to complete the written survey that you may receive in the mail after you visit with us. Thank you!        [email or web address] Information     [email or web address] lets you send messages to your doctor, view your test results, renew your prescriptions, schedule appointments and more. To sign up, go to [email or web address] . Click on \"Log in\" on the left side of the screen, which will take you to the Welcome page. Then click on \"Sign up Now\" on the right side of the page.     You will be asked to enter the access code listed below, as well as some personal information. Please follow the directions to create your username and password.     Your access code is: [email or web address]  Expires: 2017  4:22 PM     Your access code will  in 90 days. If you need help or a new code, please call your [facility] clinic or [phone].        Care EveryWhere ID     This is your Care EveryWhere ID. This could be used by other organizations to access your [facility] medical records  [id number]        After Visit Summary       This is your record. Keep this with you and show to your community pharmacist(s) and doctor(s) at your next visit.                  "

## 2017-03-24 NOTE — ED AVS SNAPSHOT
[facility] Emergency Department    [address] [place] [address]    Phone:  [phone]    Fax:  [phone]                                       Radha Beckwith   MRN: [id number]    Department:  [facility] Emergency Department   Date of Visit:  3/24/2017           After Visit Summary Signature Page     I have received my discharge instructions, and my questions have been answered. I have discussed any challenges I see with this plan with the nurse or doctor.    ..........................................................................................................................................  Patient/Patient Representative Signature      ..........................................................................................................................................  Patient Representative Print Name and Relationship to Patient    ..................................................               ................................................  Date                                            Time    ..........................................................................................................................................  Reviewed by Signature/Title    ...................................................              ..............................................  Date                                                            Time

## 2017-03-24 NOTE — DISCHARGE INSTRUCTIONS
Medications for Acid Reflux  Your health care provider has told you that you have acid reflux. This is a condition that causes stomach acid to wash up into your throat. For most people, acid reflux is troubling but not dangerous. However, left untreated, acid reflux sometimes damages the esophagus. Medications can help control acid reflux and limit your risk of future problems.  Medications for Acid Reflux  Your health care provider may prescribe medication to help treat your acid reflux. Medication will be based on your symptoms and the results of any tests. Your health care provider will explain how to take your medication. You will also be told about possible side effects.  Reducing Stomach Acid  Your doctor may suggest antacids that you can buy over the counter. Antacids can give fast relief. Or you may be told to take a type of medication called H2 blockers. These are available over the counter and by prescription (for higher doses).  Blocking Stomach Acid  In more severe cases, your doctor may suggest stronger medications such as proton pump inhibitors (PPIs). These keep the stomach from making acid. They are often prescribed for long-term use.  Other Medications  If medications to reduce or block stomach acid don t work, you may be switched to another type of medication that helps the stomach empty better.    4949-5733 [employer]. [address] [place] [address]. All rights reserved. This information is not intended as a substitute for professional medical care. Always follow your healthcare professional's instructions.          Tips to Control Acid Reflux  To control acid reflux, you ll need to make some basic diet and lifestyle changes. The simple steps outlined below may be all you ll need to relieve discomfort.  Watch What You Eat      Avoid fatty foods and spicy foods.    Eat fewer acidic foods, such as citrus and tomato-based foods. These can increase symptoms.    Limit  drinking alcohol, caffeine, and fizzy beverages. All increase acid reflux.    Try limiting chocolate, peppermint, and spearmint. These can worsen acid reflux in some people.  Watch When You Eat    Avoid lying down for 3 hours after eating.    Do not snack before going to bed.  Raise Your Head    Raising your head and upper body by 4 inches to 6 inches helps limit reflux when you re lying down. Put blocks under the head of the bed frame to raise it.  Other Changes    Lose weight, if you need to.    Don t work out near bedtime.    Avoid tight-fitting clothes.    Limit aspirin and ibuprofen.    Stop smoking.     [id number] [employer]. [address] [place] [address], [place] [address]. All rights reserved. This information is not intended as a substitute for professional medical care. Always follow your healthcare professional's instructions.          Adjustment Disorder  Life changes -- work, family, parents, children -- each can cause a great deal of stress in life. An adjustment disorder means you have trouble dealing with this change and stress. This problem can have serious results. You may feel helpless, depressed, make bad decisions, or even feel like you want to hurt yourself.  Adjustment disorder can cause anxiety or depression. It is triggered by daily stresses such as:    Death of a loved one    Divorce    Marriage    General life changes such as changing or leaving a job    Moving    Illness or other health issue for you or a family  member    Sex    Money     Symptoms may include:    Sadness, crying    Anxiety    Insomnia    Poor concentration    Trouble doing simple things    New problems at work or with family or friends    Loss of self-esteem    Sense of hopelessness    Feeling trapped or cut off from others  With this condition, it is common to feel sad, guilty, hopeless and restless. These feelings may continue for weeks or months. It can be helpful to identify what is causing the additional  stress and take steps to get extra support. If new stressful events do not occur, it is likely that you will gradually start feeling better.  Home care    If you have been given a prescription for medicine, take it as directed.    It helps to talk about your feelings and thoughts with family or friends that understand and support you.  Follow-up care  Follow up with your healthcare provider, or therapist as advised. Let them know if this condition does not improve or gets worse.  When to seek medical advice  Call your healthcare provider right away if any of these occur:    Worsening depression or anxiety    Feeling out of control    Thoughts of harming yourself or another    Being unable to care for yourself    0475-6468 [employer]. [address] [place] [address], [place] [address]. All rights reserved. This information is not intended as a substitute for professional medical care. Always follow your healthcare professional's instructions.

## 2017-03-24 NOTE — ED PROVIDER NOTES
History     Chief Complaint   Patient presents with     Abdominal Pain     HPI  Radha Beckwith is a 41 year old female who presents with upper abdominal pain that radiates around to her back this been going on for the past 5 days.  Patient states sitting seems to make the pain a little bit worse, nothing makes it better.  She denies any fevers or chills.  She states eating or drinking does not affect the pain.  She denies any recent constipation or bloody stools.  She denies any dysuria or hematuria.  She's not had many surgeries on her belly before.  She has had some alcohol the past week, she has drank 3 out of the last 5 days.    I have reviewed the Medications, Allergies, Past Medical and Surgical History, and Social History in the Epic system.    Review of Systems   All other systems reviewed and are negative.      Physical Exam   BP: (!) 185/111  Heart Rate: 123  Temp: 98.5  F (36.9  C)  Resp: 16  Weight: 67.5 kg (148 lb 13 oz)  SpO2: 99 %  Physical Exam   Constitutional: She appears well-developed and well-nourished. No distress.   HENT:   Head: Normocephalic.   Mouth/Throat: Oropharynx is clear and moist. No oropharyngeal exudate.   Eyes: Conjunctivae are normal.   Neck: Normal range of motion. Neck supple.   Cardiovascular: Normal rate, regular rhythm and normal heart sounds.  Exam reveals no gallop and no friction rub.    No murmur heard.  Pulmonary/Chest: Effort normal and breath sounds normal. No respiratory distress. She has no wheezes. She has no rales.   Abdominal: Soft. Bowel sounds are normal. She exhibits no distension. There is tenderness (epigastric). There is no rebound and no guarding.   Skin: She is not diaphoretic.   Nursing note and vitals reviewed.      ED Course     ED Course     Procedures         Results for orders placed or performed during the hospital encounter of 03/24/17   US Abdomen Limited    Narrative    LIMITED ABDOMINAL ULTRASOUND  3/24/2017 8:50 AM     HISTORY:  Right  upper quadrant pain.    COMPARISON: None.    FINDINGS:    Gallbladder: Normal with no cholelithiasis, wall thickening or focal  tenderness.      Bile ducts:  CHD is normal diameter.  No intrahepatic biliary  dilatation.    Liver: There is diffuse hyperechogenicity of the liver consistent with  diffuse fatty infiltration. No focal intrahepatic biliary ductal  dilatation or liver lesion is identified.    Pancreas:  Partially obscured but grossly unremarkable, where seen.     Right kidney:  Normal.     Aorta and Proximal IVC: Normal.      Impression    IMPRESSION:   1. Diffuse fatty infiltration of the liver.  2. No evidence for cholelithiasis or acute cholecystitis.  3. No other etiology for patient's right upper quadrant abdominal pain  is identified.   CT Abdomen Pelvis w Contrast    Narrative    CT ABDOMEN AND PELVIS WITH CONTRAST  3/24/2017 9:33 AM    HISTORY:  Upper abdominal pain.    TECHNIQUE: Scans obtained from the diaphragm through the pelvis with  oral and IV contrast, 72 mL,  Isovue-370.   Radiation dose for this scan was reduced using automated exposure  control, adjustment of the mA and/or kV according to patient size, or  iterative reconstruction technique.    COMPARISON:  Limited abdominal ultrasound dated 3/24/2017, noncontrast  CT abdomen and pelvis dated 3/8/2008.    FINDINGS: There is mild dependent atelectasis in the bilateral lung  bases. Visualized portions of the lung bases and mediastinal contents  are otherwise grossly unremarkable. No aggressive osseous lesions are  seen.    There is mild diffuse fatty infiltration of the liver. A few  calcifications are again seen in the liver. Focal area of slightly  increased fatty infiltration is seen adjacent to the falciform  ligament. Calcification in the spleen is again noted and consistent  with chronic granulomatous disease. The liver, gallbladder, pancreas,  spleen, bilateral adrenal glands and bilateral kidneys otherwise  enhance normally. No  hydronephrosis, nephrolithiasis, hydroureter, or  ureteral calculus is identified.    No adenopathy, free fluid, or free air is seen in the peritoneal  cavity. Aorta is normal in appearance.    The uterus and left ovary are normal in appearance. Cystic structure  in the region expected for the left ovary measures up to 2.2 cm and  likely represents a dominant follicle. A cyst was also seen in this  region on the prior study dated 2008.    The colon is of normal caliber without pericolonic inflammatory change  to suggest acute diverticulitis. Appendix is normal in appearance.  Small bowel is of normal caliber. Stomach is unremarkable.      Impression    IMPRESSION:  1. Diffuse fatty infiltration of the liver.  2. Stable evidence of chronic granulomatous disease of the liver and  spleen.  3. No renal or ureteral calculus or evidence for urinary system  obstruction.  4. No other etiology for patient's symptoms is seen.   CBC with platelets differential   Result Value Ref Range    WBC 6.8 4.0 - 11.0 10e9/L    RBC Count 4.51 3.8 - 5.2 10e12/L    Hemoglobin 14.5 11.7 - 15.7 g/dL    Hematocrit 42.9 35.0 - 47.0 %    MCV 95 78 - 100 fl    MCH 32.2 26.5 - 33.0 pg    MCHC 33.8 31.5 - 36.5 g/dL    RDW 12.7 10.0 - 15.0 %    Platelet Count 226 150 - 450 10e9/L    Diff Method Automated Method     % Neutrophils 62.9 %    % Lymphocytes 28.4 %    % Monocytes 5.8 %    % Eosinophils 2.2 %    % Basophils 0.4 %    % Immature Granulocytes 0.3 %    Absolute Neutrophil 4.3 1.6 - 8.3 10e9/L    Absolute Lymphocytes 1.9 0.8 - 5.3 10e9/L    Absolute Monocytes 0.4 0.0 - 1.3 10e9/L    Absolute Eosinophils 0.2 0.0 - 0.7 10e9/L    Absolute Basophils 0.0 0.0 - 0.2 10e9/L    Abs Immature Granulocytes 0.0 0 - 0.4 10e9/L   Basic metabolic panel   Result Value Ref Range    Sodium 144 133 - 144 mmol/L    Potassium 3.8 3.4 - 5.3 mmol/L    Chloride 110 (H) 94 - 109 mmol/L    Carbon Dioxide 24 20 - 32 mmol/L    Anion Gap 10 3 - 14 mmol/L    Glucose 127 (H)  70 - 99 mg/dL    Urea Nitrogen 8 7 - 30 mg/dL    Creatinine 0.68 0.52 - 1.04 mg/dL    GFR Estimate >90  Non  GFR Calc   >60 mL/min/1.7m2    GFR Estimate If Black >90   GFR Calc   >60 mL/min/1.7m2    Calcium 8.9 8.5 - 10.1 mg/dL   Hepatic panel   Result Value Ref Range    Bilirubin Direct <0.1 0.0 - 0.2 mg/dL    Bilirubin Total 0.3 0.2 - 1.3 mg/dL    Albumin 3.6 3.4 - 5.0 g/dL    Protein Total 7.4 6.8 - 8.8 g/dL    Alkaline Phosphatase 67 40 - 150 U/L    ALT 35 0 - 50 U/L    AST 22 0 - 45 U/L   Lipase   Result Value Ref Range    Lipase 143 73 - 393 U/L   Alcohol ethyl   Result Value Ref Range    Ethanol g/dL <0.01 <0.01 g/dL   INR   Result Value Ref Range    INR 0.87 0.86 - 1.14   Partial thromboplastin time   Result Value Ref Range    PTT 24 22 - 37 sec   Troponin I   Result Value Ref Range    Troponin I ES  0.000 - 0.045 ug/L     <0.015  The 99th percentile for upper reference range is 0.045 ug/L.  Troponin values in   the range of 0.045 - 0.120 ug/L may be associated with risks of adverse   clinical events.       Medications   lidocaine 1 % 1 mL (not administered)   lidocaine (LMX4) kit (not administered)   sodium chloride (PF) 0.9% PF flush 3 mL (3 mLs Intracatheter Given 3/24/17 0804)   sodium chloride (PF) 0.9% PF flush 3 mL (not administered)   0.9% sodium chloride BOLUS (1,000 mLs Intravenous New Bag 3/24/17 0804)     Followed by   0.9% sodium chloride infusion (not administered)   lidocaine (XYLOCAINE) 2 % 15 mL, alum & mag hydroxide-simethicone (MYLANTA ES/MAALOX  ES) 15 mL GI Cocktail (30 mLs Oral Given 3/24/17 0805)   sodium chloride 0.9 % for CT scan flush dose 500 mL (61 mLs Intravenous Given 3/24/17 0921)   iopamidol (ISOVUE-370) solution 500 mL (72 mLs Intravenous Given 3/24/17 0922)   sodium chloride (PF) 0.9% PF flush 3 mL (3 mLs Intracatheter Given 3/24/17 0921)     labs are reviewed and were completely normal including a normal white blood cell count, hemoglobin and  liver function test.  I did an ultrasound because of the location of the pain to rule out gallbladder etiology and this was negative.  I also did a CT scan to make sure we were missing anything else in this is normal.  The patient does have a known history of Gerd, I think that her symptoms could be from this.  She does admit to a lot of anxiety recently and this could be contributing also.  I will discharge the patient on Carafate for better Gerd treatment and I will give her a few Ativan pills to see if this helps with her anxiety.  I went to put in a referral for an outpatient endoscopy and then I will have the patient follow-up with her primary care doctor after that.    Assessments & Plan (with Medical Decision Making)  Gerd, anxiety      I have reviewed the nursing notes.    I have reviewed the findings, diagnosis, plan and need for follow up with the patient.            3/24/2017   [facility] EMERGENCY DEPARTMENT     Dino Contreras MD  03/24/17 1037

## 2017-03-27 NOTE — PROGRESS NOTES
SUBJECTIVE:                                                    Radha Beckwith is a 41 year old female who presents to clinic today for the following health issues:      Anxiety Follow-Up    Status since last visit: Worsened, not improved with the Seroquel.  Did get some ativan in ER recently.  Getting some benefit from this    Other associated symptoms:a lot more depressed, her mother just keeps getting worse    Complicating factors:   Significant life event: Yes-  Uncle and great aunt passed away   Current substance abuse: None  Depression symptoms: Yes-    BO-7 SCORE 10/14/2016 2/3/2017 2/3/2017   Total Score 9 10 10        GAD7     Pt here to f/u on her anxiety.  Seroquel works better than trazodone or amitriptyline for sleep.  She's afraid to take 2 of them because she doesn't want to sleep too deeply.  Taking one along with melatonin and sleeping OK.  Worried about sleeping too deeply with needing to care for her mother.  Mother isn't doing very well at all.  Breathing is worse, balance is terrible.  Her aunt also passed away recently.      She was in the ER recently after her uncle's .  Extensive workup indicated possible GERD/ulcer.  Is on sucralfate currently, has been set up for EGD.  Also told that she has fairly severe anxiety.      She would be open to trying SSRI at this point.    She's terrified of having a pap test.  Can't control her anxiety about this, gets migraines, etc.      Amount of exercise or physical activity:  A lot, works on her feet all day.    Problems taking medications regularly: No    Medication side effects: none    Diet: regular (no restrictions)        Problem list and histories reviewed & adjusted, as indicated.  Additional history: as documented    Current Outpatient Prescriptions   Medication Sig Dispense Refill     LORazepam (ATIVAN) 1 MG tablet Take 0.5 tablets (0.5 mg) by mouth every 8 hours as needed for anxiety 4 tablet 0     sucralfate (CARAFATE) 1 GM tablet  "Take 1 tablet (1 g) by mouth 4 times daily 40 tablet 1     ondansetron (ZOFRAN) 4 MG tablet TAKE ONE TABLET BY MOUTH EVERY 6 HOURS AS NEEDED FOR NAUSEA OR VOMITING 18 tablet 0     QUEtiapine (SEROQUEL) 50 MG tablet Take 1-2 tablets ( mg) by mouth nightly as needed 60 tablet 1     loratadine (CLARITIN) 10 MG tablet Take 10 mg by mouth daily       omeprazole 20 MG tablet Take 1 tablet (20 mg) by mouth 2 times daily Take 30-60 minutes before a meal. 60 tablet 10     metoclopramide (REGLAN) 10 MG tablet TAKE ONE TABLET BY MOUTH THREE TIMES A DAY AS NEEDED (Patient not taking: Reported on 3/30/2017) 90 tablet 2     Recent Labs   Lab Test  03/24/17   0803  10/02/15   0823   06/23/15   1610  08/19/13   1205   ALT  35   --    --   32  41   CR  0.68  0.60   < >  0.97  0.76   GFRESTIMATED  >90  Non  GFR Calc    >90  Non  GFR Calc     < >  63  85   GFRESTBLACK  >90   GFR Calc    >90   GFR Calc     < >  76  >90   POTASSIUM  3.8  3.9   < >  2.6*  4.1    < > = values in this interval not displayed.      BP Readings from Last 3 Encounters:   03/30/17 146/86   03/24/17 121/76   02/03/17 142/86    Wt Readings from Last 3 Encounters:   03/30/17 152 lb (68.9 kg)   03/24/17 148 lb 13 oz (67.5 kg)   02/03/17 148 lb 12.8 oz (67.5 kg)                 Reviewed and updated as needed this visit by clinical staff       Reviewed and updated as needed this visit by Provider         ROS:  Constitutional, HEENT, cardiovascular, pulmonary, gi and gu systems are negative, except as otherwise noted.    OBJECTIVE:                                                    /86 (BP Location: Left arm, Patient Position: Chair, Cuff Size: Adult Regular)  Pulse 112  Temp 97.4  F (36.3  C) (Temporal)  Resp 12  Ht 5' 4\" (1.626 m)  Wt 152 lb (68.9 kg)  LMP  (LMP Unknown)  BMI 26.09 kg/m2  Body mass index is 26.09 kg/(m^2).  GENERAL: healthy, alert and no distress  NECK: no adenopathy, " no asymmetry, masses, or scars and thyroid normal to palpation  RESP: lungs clear to auscultation - no rales, rhonchi or wheezes  CV: tachycardia, normal S1 S2, no S3 or S4, no murmur, click or rub, peripheral pulses strong and no peripheral edema  ABDOMEN: soft, nontender, no hepatosplenomegaly, no masses and bowel sounds normal  MS: no gross musculoskeletal defects noted, no edema    Diagnostic Test Results:  Results for orders placed or performed during the hospital encounter of 03/24/17   US Abdomen Limited    Narrative    LIMITED ABDOMINAL ULTRASOUND  3/24/2017 8:50 AM     HISTORY:  Right upper quadrant pain.    COMPARISON: None.    FINDINGS:    Gallbladder: Normal with no cholelithiasis, wall thickening or focal  tenderness.      Bile ducts:  CHD is normal diameter.  No intrahepatic biliary  dilatation.    Liver: There is diffuse hyperechogenicity of the liver consistent with  diffuse fatty infiltration. No focal intrahepatic biliary ductal  dilatation or liver lesion is identified.    Pancreas:  Partially obscured but grossly unremarkable, where seen.     Right kidney:  Normal.     Aorta and Proximal IVC: Normal.      Impression    IMPRESSION:   1. Diffuse fatty infiltration of the liver.  2. No evidence for cholelithiasis or acute cholecystitis.  3. No other etiology for patient's right upper quadrant abdominal pain  is identified.    WILTON HARGROVE MD   CT Abdomen Pelvis w Contrast    Narrative    CT ABDOMEN AND PELVIS WITH CONTRAST  3/24/2017 9:33 AM    HISTORY:  Upper abdominal pain.    TECHNIQUE: Scans obtained from the diaphragm through the pelvis with  oral and IV contrast, 72 mL,  Isovue-370.   Radiation dose for this scan was reduced using automated exposure  control, adjustment of the mA and/or kV according to patient size, or  iterative reconstruction technique.    COMPARISON:  Limited abdominal ultrasound dated 3/24/2017, noncontrast  CT abdomen and pelvis dated 3/8/2008.    FINDINGS: There is mild  dependent atelectasis in the bilateral lung  bases. Visualized portions of the lung bases and mediastinal contents  are otherwise grossly unremarkable. No aggressive osseous lesions are  seen.    There is mild diffuse fatty infiltration of the liver. A few  calcifications are again seen in the liver. Focal area of slightly  increased fatty infiltration is seen adjacent to the falciform  ligament. Calcification in the spleen is again noted and consistent  with chronic granulomatous disease. The liver, gallbladder, pancreas,  spleen, bilateral adrenal glands and bilateral kidneys otherwise  enhance normally. No hydronephrosis, nephrolithiasis, hydroureter, or  ureteral calculus is identified.    No adenopathy, free fluid, or free air is seen in the peritoneal  cavity. Aorta is normal in appearance.    The uterus and left ovary are normal in appearance. Cystic structure  in the region expected for the left ovary measures up to 2.2 cm and  likely represents a dominant follicle. A cyst was also seen in this  region on the prior study dated 2008.    The colon is of normal caliber without pericolonic inflammatory change  to suggest acute diverticulitis. Appendix is normal in appearance.  Small bowel is of normal caliber. Stomach is unremarkable.      Impression    IMPRESSION:  1. Diffuse fatty infiltration of the liver.  2. Stable evidence of chronic granulomatous disease of the liver and  spleen.  3. No renal or ureteral calculus or evidence for urinary system  obstruction.  4. No other etiology for patient's symptoms is seen.    WILTON HARGROVE MD   CBC with platelets differential   Result Value Ref Range    WBC 6.8 4.0 - 11.0 10e9/L    RBC Count 4.51 3.8 - 5.2 10e12/L    Hemoglobin 14.5 11.7 - 15.7 g/dL    Hematocrit 42.9 35.0 - 47.0 %    MCV 95 78 - 100 fl    MCH 32.2 26.5 - 33.0 pg    MCHC 33.8 31.5 - 36.5 g/dL    RDW 12.7 10.0 - 15.0 %    Platelet Count 226 150 - 450 10e9/L    Diff Method Automated Method     %  Neutrophils 62.9 %    % Lymphocytes 28.4 %    % Monocytes 5.8 %    % Eosinophils 2.2 %    % Basophils 0.4 %    % Immature Granulocytes 0.3 %    Absolute Neutrophil 4.3 1.6 - 8.3 10e9/L    Absolute Lymphocytes 1.9 0.8 - 5.3 10e9/L    Absolute Monocytes 0.4 0.0 - 1.3 10e9/L    Absolute Eosinophils 0.2 0.0 - 0.7 10e9/L    Absolute Basophils 0.0 0.0 - 0.2 10e9/L    Abs Immature Granulocytes 0.0 0 - 0.4 10e9/L   Basic metabolic panel   Result Value Ref Range    Sodium 144 133 - 144 mmol/L    Potassium 3.8 3.4 - 5.3 mmol/L    Chloride 110 (H) 94 - 109 mmol/L    Carbon Dioxide 24 20 - 32 mmol/L    Anion Gap 10 3 - 14 mmol/L    Glucose 127 (H) 70 - 99 mg/dL    Urea Nitrogen 8 7 - 30 mg/dL    Creatinine 0.68 0.52 - 1.04 mg/dL    GFR Estimate >90  Non  GFR Calc   >60 mL/min/1.7m2    GFR Estimate If Black >90   GFR Calc   >60 mL/min/1.7m2    Calcium 8.9 8.5 - 10.1 mg/dL   Hepatic panel   Result Value Ref Range    Bilirubin Direct <0.1 0.0 - 0.2 mg/dL    Bilirubin Total 0.3 0.2 - 1.3 mg/dL    Albumin 3.6 3.4 - 5.0 g/dL    Protein Total 7.4 6.8 - 8.8 g/dL    Alkaline Phosphatase 67 40 - 150 U/L    ALT 35 0 - 50 U/L    AST 22 0 - 45 U/L   Lipase   Result Value Ref Range    Lipase 143 73 - 393 U/L   Alcohol ethyl   Result Value Ref Range    Ethanol g/dL <0.01 <0.01 g/dL   INR   Result Value Ref Range    INR 0.87 0.86 - 1.14   Partial thromboplastin time   Result Value Ref Range    PTT 24 22 - 37 sec   Troponin I   Result Value Ref Range    Troponin I ES  0.000 - 0.045 ug/L     <0.015  The 99th percentile for upper reference range is 0.045 ug/L.  Troponin values in   the range of 0.045 - 0.120 ug/L may be associated with risks of adverse   clinical events.          ASSESSMENT/PLAN:                                                        ICD-10-CM    1. Anxiety F41.9 LORazepam (ATIVAN) 1 MG tablet     QUEtiapine (SEROQUEL) 50 MG tablet     sertraline (ZOLOFT) 50 MG tablet     atenolol (TENORMIN) 25 MG  tablet   2. Psychophysiological insomnia F51.04 QUEtiapine (SEROQUEL) 50 MG tablet   3. Gastroesophageal reflux disease, esophagitis presence not specified K21.9 sucralfate (CARAFATE) 1 GM tablet     GASTROENTEROLOGY ADULT REF PROCEDURE ONLY   4. CARDIOVASCULAR SCREENING; LDL GOAL LESS THAN 160 Z13.6 Lipid Profile (Chol, Trig, HDL, LDL calc)   5. Hyperglycemia R73.9 Basic metabolic panel   6. Supraventricular tachycardia (H) I47.1 atenolol (TENORMIN) 25 MG tablet   7. Depo-Provera contraceptive status Z30.40 medroxyPROGESTERone (DEPO-PROVERA) 150 MG/ML injection     Medroxyprogesterone inj/1mg (Depo Provera J-Code)     INJECTION INTRAMUSCULAR OR SUB-Q     1.  Not controlled.  Pt agreed to trial of sertraline at this time.  Will continue Seroquel and occasional lorazepam.  I tried to indicate that lorazepam, though helpful, is not a good long-term treatment option as monotherapy.  F/u in 1-2 months.  2.  Improved.  She'd probably do better on slightly higher dose, but pt is worried she won't wake when her mother needs her.  3.  Improved.  Will get her in for EGD.    4.  Check labs.  5.  Recheck labs.  Work on risk factor reduction.  6.  Persistent of late.  May be related to anxiety.  Will start some atenolol for symptom control.  Follow up at future visit.  7.  Will give one more injection, but I stressed to patient that she does need to get her pap testing done.  I won't continue to authorize this without appropriate preventive care.              Patient Instructions   Thank you for visiting [facility]    Let's start sertraline to help with your mood.  Let me know if problems.    Continue Seroquel for sleep at this time.    I also recommend that you start atenolol to help with anxiety and your racing heart.       Continue sucralfate until your EGD.    You can check with Dr. Chandler to see if he would be willing to do your pap under conscious sedation in the OR.  Otherwise, you can take 1-2 of the  ativan before the procedure, but you'll need someone to drive you.    Please return for fasting labs when convenient.    Let me know if you're willing to try some counseling.    Please see me in 1-2 months for follow up.     If you use lorazepam, do so very sparingly.  This isn't a good long-term single treatment for your anxiety.    If you had imaging scheduled please refer to your radiology prep sheet.    Appointment    Date_______________     Time_____________    Day:   M T[facility] W TH F    With____________________________    Location_________________________    If you need medication refills, please contact your pharmacy 3 days before your prescriptions runs out. If you are out of refills, your pharmacy will contact contact the clinic.    Contact us or return if questions or concerns.     -Your Care Team:  MD Peyton Casarez PA-C Folake Falaki, MD Anoshirvan Mazhari, MD Kelly White, JEN    General information about your clinic      Clinic hours:     Lab hours:  Phone [phone]  Monday 7:30 am-7 pm    Monday 8:30 am-6:30 pm  Tuesday-Friday 7:30 am-5 pm   Tuesday-Friday 8:30 am-4:30 pm    Pharmacy hours:  Phone [phone]  Monday 8:30 am-7pm  Tuesday-Friday 8:30am-6 pm                                       Mychart assistance [phone]        We would like to hear from you, how was your visit today?    Angela BERRIOS'Reginaldo Jacinto  Patient Information Supervisor   Patient Care Supervisor  [facility] [place], and [facility] [place], and [facility]  [phone]         Clemente Chino MD, MD  [facility]

## 2017-03-30 ENCOUNTER — OFFICE VISIT (OUTPATIENT)
Dept: FAMILY MEDICINE | Facility: OTHER | Age: 42
End: 2017-03-30
Payer: COMMERCIAL

## 2017-03-30 VITALS
HEART RATE: 112 BPM | RESPIRATION RATE: 12 BRPM | SYSTOLIC BLOOD PRESSURE: 134 MMHG | TEMPERATURE: 97.4 F | BODY MASS INDEX: 25.95 KG/M2 | HEIGHT: 64 IN | WEIGHT: 152 LBS | DIASTOLIC BLOOD PRESSURE: 84 MMHG

## 2017-03-30 DIAGNOSIS — F51.04 PSYCHOPHYSIOLOGICAL INSOMNIA: ICD-10-CM

## 2017-03-30 DIAGNOSIS — F41.9 ANXIETY: Primary | ICD-10-CM

## 2017-03-30 DIAGNOSIS — R73.9 HYPERGLYCEMIA: ICD-10-CM

## 2017-03-30 DIAGNOSIS — K21.9 GASTROESOPHAGEAL REFLUX DISEASE, ESOPHAGITIS PRESENCE NOT SPECIFIED: ICD-10-CM

## 2017-03-30 DIAGNOSIS — Z30.42 DEPO-PROVERA CONTRACEPTIVE STATUS: ICD-10-CM

## 2017-03-30 DIAGNOSIS — I47.10 SUPRAVENTRICULAR TACHYCARDIA (H): ICD-10-CM

## 2017-03-30 DIAGNOSIS — Z13.6 CARDIOVASCULAR SCREENING; LDL GOAL LESS THAN 160: ICD-10-CM

## 2017-03-30 PROCEDURE — 96372 THER/PROPH/DIAG INJ SC/IM: CPT | Performed by: FAMILY MEDICINE

## 2017-03-30 PROCEDURE — 99214 OFFICE O/P EST MOD 30 MIN: CPT | Mod: 25 | Performed by: FAMILY MEDICINE

## 2017-03-30 RX ORDER — SUCRALFATE 1 G/1
1 TABLET ORAL 4 TIMES DAILY
Qty: 120 TABLET | Refills: 1 | Status: SHIPPED | OUTPATIENT
Start: 2017-03-30 | End: 2017-10-27

## 2017-03-30 RX ORDER — QUETIAPINE FUMARATE 50 MG/1
50-100 TABLET, FILM COATED ORAL
Qty: 60 TABLET | Refills: 3 | Status: SHIPPED | OUTPATIENT
Start: 2017-03-30 | End: 2017-10-09

## 2017-03-30 RX ORDER — ATENOLOL 25 MG/1
25 TABLET ORAL DAILY
Qty: 30 TABLET | Refills: 1 | Status: SHIPPED | OUTPATIENT
Start: 2017-03-30 | End: 2017-06-08

## 2017-03-30 RX ORDER — MEDROXYPROGESTERONE ACETATE 150 MG/ML
150 INJECTION, SUSPENSION INTRAMUSCULAR ONCE
Qty: 1 ML | Refills: 0 | OUTPATIENT
Start: 2017-03-30 | End: 2017-07-07

## 2017-03-30 RX ORDER — LORAZEPAM 1 MG/1
0.5 TABLET ORAL EVERY 8 HOURS PRN
Qty: 20 TABLET | Refills: 0 | Status: SHIPPED | OUTPATIENT
Start: 2017-03-30 | End: 2017-05-22

## 2017-03-30 ASSESSMENT — ANXIETY QUESTIONNAIRES
IF YOU CHECKED OFF ANY PROBLEMS ON THIS QUESTIONNAIRE, HOW DIFFICULT HAVE THESE PROBLEMS MADE IT FOR YOU TO DO YOUR WORK, TAKE CARE OF THINGS AT HOME, OR GET ALONG WITH OTHER PEOPLE: VERY DIFFICULT
6. BECOMING EASILY ANNOYED OR IRRITABLE: NEARLY EVERY DAY
7. FEELING AFRAID AS IF SOMETHING AWFUL MIGHT HAPPEN: NEARLY EVERY DAY
1. FEELING NERVOUS, ANXIOUS, OR ON EDGE: NEARLY EVERY DAY
5. BEING SO RESTLESS THAT IT IS HARD TO SIT STILL: MORE THAN HALF THE DAYS
3. WORRYING TOO MUCH ABOUT DIFFERENT THINGS: MORE THAN HALF THE DAYS
2. NOT BEING ABLE TO STOP OR CONTROL WORRYING: MORE THAN HALF THE DAYS
GAD7 TOTAL SCORE: 16

## 2017-03-30 ASSESSMENT — PATIENT HEALTH QUESTIONNAIRE - PHQ9: 5. POOR APPETITE OR OVEREATING: SEVERAL DAYS

## 2017-03-30 ASSESSMENT — PAIN SCALES - GENERAL: PAINLEVEL: MODERATE PAIN (4)

## 2017-03-30 NOTE — NURSING NOTE
Prior to injection verified patient identity using patient's name and date of birth.  BLOOD PRESSURE: 134/84    DATE OF LAST PAP or ANNUAL EXAM: No results found for: PAP  URINE HCG:not indicated    The following medication was given:     MEDICATION: Depo Provera 150mg  ROUTE: IM  SITE: Deltoid - Left  : [employer]  LOT #: [id number]  EXPIRATION:11/01/19  NEXT INJECTION DUE: Felicity 15-29, 2017  Provider: Dr. Chino  NDC: [address]-0  Zenon Malik CMA

## 2017-03-30 NOTE — PATIENT INSTRUCTIONS
Thank you for visiting [facility]    Let's start sertraline to help with your mood.  Let me know if problems.    Continue Seroquel for sleep at this time.    I also recommend that you start atenolol to help with anxiety and your racing heart.       Continue sucralfate until your EGD.    You can check with Dr. Chandler to see if he would be willing to do your pap under conscious sedation in the OR.  Otherwise, you can take 1-2 of the ativan before the procedure, but you'll need someone to drive you.    Please return for fasting labs when convenient.    Let me know if you're willing to try some counseling.    Please see me in 1-2 months for follow up.     If you use lorazepam, do so very sparingly.  This isn't a good long-term single treatment for your anxiety.    If you had imaging scheduled please refer to your radiology prep sheet.    Appointment    Date_______________     Time_____________    Day:   M TU W TH F    With____________________________    Location_________________________    If you need medication refills, please contact your pharmacy 3 days before your prescriptions runs out. If you are out of refills, your pharmacy will contact contact the clinic.    Contact us or return if questions or concerns.     -Your Care Team:  MD Peyton Casarez PA-C Folake Falaki, MD Anoshirvan Mazhari, MD Kelly White, JEN    General information about your clinic      Clinic hours:     Lab hours:  Phone [phone]  Monday 7:30 am-7 pm    Monday 8:30 am-6:30 pm  Tuesday-Friday 7:30 am-5 pm   Tuesday-Friday 8:30 am-4:30 pm    Pharmacy hours:  Phone [phone]  Monday 8:30 am-7pm  Tuesday-Friday 8:30am-6 pm                                       Mychart assistance [phone]        We would like to hear from you, how was your visit today?    Angela Jacinto  Patient Information Supervisor   Patient Care Supervisor  Franci Hong, and [facility]  [facility] Franci Hong, and [facility]  [phone]

## 2017-03-30 NOTE — MR AVS SNAPSHOT
After Visit Summary   3/30/2017    Radha Beckwith    MRN: [id number]           Patient Information     Date Of Birth          1975        Visit Information        Provider Department      3/30/2017 2:00 PM Clemente Chino MD [facility]        Today's Diagnoses     Anxiety    -  1    Psychophysiological insomnia        Gastroesophageal reflux disease, esophagitis presence not specified        CARDIOVASCULAR SCREENING; LDL GOAL LESS THAN 160        Hyperglycemia        Supraventricular tachycardia (H)        Depo-Provera contraceptive status          Care Instructions    Thank you for visiting [facility]    Let's start sertraline to help with your mood.  Let me know if problems.    Continue Seroquel for sleep at this time.    I also recommend that you start atenolol to help with anxiety and your racing heart.       Continue sucralfate until your EGD.    You can check with Dr. Chadnler to see if he would be willing to do your pap under conscious sedation in the OR.  Otherwise, you can take 1-2 of the ativan before the procedure, but you'll need someone to drive you.    Please return for fasting labs when convenient.    Let me know if you're willing to try some counseling.    Please see me in 1-2 months for follow up.     If you use lorazepam, do so very sparingly.  This isn't a good long-term single treatment for your anxiety.    If you had imaging scheduled please refer to your radiology prep sheet.    Appointment    Date_______________     Time_____________    Day:   M TU W TH F    With____________________________    Location_________________________    If you need medication refills, please contact your pharmacy 3 days before your prescriptions runs out. If you are out of refills, your pharmacy will contact contact the clinic.    Contact us or return if questions or concerns.     -Your Care Team:  MD Peyton Casarez,  MD Donny oG MD Kelly White, JEN    General information about your clinic      Clinic hours:     Lab hours:  Phone [phone]  Monday 7:30 am-7 pm    Monday 8:30 am-6:30 pm  Tuesday-Friday 7:30 am-5 pm   Tuesday-Friday 8:30 am-4:30 pm    Pharmacy hours:  Phone [phone]  Monday 8:30 am-7pm  Tuesday-Friday 8:30am-6 pm                                       Mychart assistance [phone]        We would like to hear from you, how was your visit today?    Angela Jacinto  Patient Information Supervisor   Patient Care Supervisor  [facility], [facility] [place], and [facility]  [phone]             Follow-ups after your visit        Additional Services     GASTROENTEROLOGY ADULT REF PROCEDURE ONLY       Last Lab Result: Creatinine (mg/dL)       Date                     Value                 03/24/2017               0.68             ----------  Body mass index is 26.09 kg/(m^2).     Needed:  No  Language:  English    Patient will be contacted to schedule procedure.     Please be aware that coverage of these services is subject to the terms and limitations of your health insurance plan.  Call member services at your health plan with any benefit or coverage questions.  Any procedures must be performed at a [place] facility OR coordinated by your clinic's referral office.    Please bring the following with you to your appointment:    (1) Any X-Rays, CTs or MRIs which have been performed.  Contact the facility where they were done to arrange for  prior to your scheduled appointment.    (2) List of current medications   (3) This referral request   (4) Any documents/labs given to you for this referral                  Who to contact     If you have questions or need follow up information about today's clinic visit or your schedule please contact [facility] directly at  "[phone].  Normal or non-critical lab and imaging results will be communicated to you by SirionLab[facility]art, letter or phone within 4 business days after the clinic has received the results. If you do not hear from us within 7 days, please contact the clinic through [facility]ionLab[facility]art or phone. If you have a critical or abnormal lab result, we will notify you by phone as soon as possible.  Submit refill requests through [facility] or call your pharmacy and they will forward the refill request to us. Please allow 3 business days for your refill to be completed.          Additional Information About Your Visit        SirionLab[facility]art Information     PFS[facility] lets you send messages to your doctor, view your test results, renew your prescriptions, schedule appointments and more. To sign up, go to [email or web address] . Click on \"Log in\" on the left side of the screen, which will take you to the Welcome page. Then click on \"Sign up Now\" on the right side of the page.     You will be asked to enter the access code listed below, as well as some personal information. Please follow the directions to create your username and password.     Your access code is: [email or web address]  Expires: 2017  2:47 PM     Your access code will  in 90 days. If you need help or a new code, please call your [facility] clinic or [phone].        Care EveryWhere ID     This is your Care EveryWhere ID. This could be used by other organizations to access your [facility] medical records  [id number]        Your Vitals Were     Pulse Temperature Respirations Height Last Period BMI (Body Mass Index)    112 97.4  F (36.3  C) (Temporal) 12 5' 4\" (1.626 m) (LMP Unknown) 26.09 kg/m2       Blood Pressure from Last 3 Encounters:   17 124/86   17 103/56   17 134/84    Weight from Last 3 Encounters:   17 149 lb (67.6 kg)   17 152 lb (68.9 kg)   17 152 lb (68.9 kg)              We Performed the Following     GASTROENTEROLOGY ADULT REF PROCEDURE " ONLY     INJECTION INTRAMUSCULAR OR SUB-Q     Medroxyprogesterone inj/1mg (Depo Provera J-Code)          Today's Medication Changes          These changes are accurate as of: 3/30/17 11:59 PM.  If you have any questions, ask your nurse or doctor.               Start taking these medicines.        Dose/Directions    atenolol 25 MG tablet   Commonly known as:  TENORMIN   Used for:  Anxiety, Supraventricular tachycardia (H)   Started by:  Clemente Chino MD        Dose:  25 mg   Take 1 tablet (25 mg) by mouth daily   Quantity:  30 tablet   Refills:  1       medroxyPROGESTERone 150 MG/ML injection   Commonly known as:  DEPO-PROVERA   Used for:  Depo-Provera contraceptive status   Started by:  Clemente Chino MD        Dose:  150 mg   Inject 1 mL (150 mg) into the muscle once for 1 dose   Quantity:  1 mL   Refills:  0       sertraline 50 MG tablet   Commonly known as:  ZOLOFT   Used for:  Anxiety   Started by:  Clemente Chino MD        Dose:  50 mg   Take 1 tablet (50 mg) by mouth daily   Quantity:  30 tablet   Refills:  1            Where to get your medicines      These medications were sent to [facility] - [address]   [address] [place] [address]     Phone:  [phone]     atenolol 25 MG tablet    QUEtiapine 50 MG tablet    sertraline 50 MG tablet    sucralfate 1 GM tablet         Some of these will need a paper prescription and others can be bought over the counter.  Ask your nurse if you have questions.     Bring a paper prescription for each of these medications     LORazepam 1 MG tablet       You don't need a prescription for these medications     medroxyPROGESTERone 150 MG/ML injection                Primary Care Provider Office Phone #    [facility] [phone]       No address on file        Equal Access to Services     [facility] SAMI AH: carrie Lemus, yadi ramesh  alber viverosarin laDaliayeni ah. So [facility] [phone].    ATENCIÓN: Si habla sidañol, tiene a rueda disposición servicios gratuitos de asistencia lingüística. Nancy tatum [phone].    We comply with applicable federal civil rights laws and [place] laws. We do not discriminate on the basis of race, color, national origin, age, disability sex, sexual orientation or gender identity.            Thank you!     Thank you for choosing [facility]  for your care. Our goal is always to provide you with excellent care. Hearing back from our patients is one way we can continue to improve our services. Please take a few minutes to complete the written survey that you may receive in the mail after your visit with us. Thank you!             Your Updated Medication List - Protect others around you: Learn how to safely use, store and throw away your medicines at www.disposemymeds.org.          This list is accurate as of: 3/30/17 11:59 PM.  Always use your most recent med list.                   Brand Name Dispense Instructions for use Diagnosis    atenolol 25 MG tablet    TENORMIN    30 tablet    Take 1 tablet (25 mg) by mouth daily    Anxiety, Supraventricular tachycardia (H)       loratadine 10 MG tablet    CLARITIN     Take 10 mg by mouth daily        LORazepam 1 MG tablet    ATIVAN    20 tablet    Take 0.5 tablets (0.5 mg) by mouth every 8 hours as needed for anxiety    Anxiety       medroxyPROGESTERone 150 MG/ML injection    DEPO-PROVERA    1 mL    Inject 1 mL (150 mg) into the muscle once for 1 dose    Depo-Provera contraceptive status       QUEtiapine 50 MG tablet    SEROQUEL    60 tablet    Take 1-2 tablets ( mg) by mouth nightly as needed    Anxiety, Psychophysiological insomnia       sertraline 50 MG tablet    ZOLOFT    30 tablet    Take 1 tablet (50 mg) by mouth daily    Anxiety       sucralfate 1 GM tablet    CARAFATE    120 tablet    Take 1 tablet (1 g) by mouth 4 times daily    Gastroesophageal  reflux disease, esophagitis presence not specified

## 2017-03-30 NOTE — LETTER
Upper Endoscopy    Date of procedure:___________      Location:________________  Please arrive at:_____________    Procedure time:________________    Review the preparation schedule below for the five days preceding your procedure.     If you have any questions, please call [phone] press option 2.          5 Days Prior  *CHECK WITH YOUR INSURANCE REGARDING COVERAGE PRIOR TO PROCEDURE.  If you take Coumadin (Warfarin), Plavix, Ticlid, Aggrenox:, insulin, diabetic pills and/or iron products contact your doctor for specific instructions.  Have INR checked the day before the procedure.  Please remember to arrange a responsible adult to accompany you home.   must be     present upon discharge.    1 Days Prior  Eat normally up until midnight.  You may drink small amounts of clear liquids up until 3 hours prior to your arrival.  **Please see Clear Liquid Diet Sheet for suggested liquids.    Procedure Day    From midnight until 3 hours prior to your procedure, you may drink small amounts of clear liquids.  Do not take your morning medications until after you have completed your procedure.  Bring a list of your medications with you on the day of your procedure.     *Reminder:  Have transportation arranged to take you home.   must accompany you to the procedure.  Do not plan to drive or operate vehicles or machinery the day of your exam.      Clear Liquid Diet  Beverages  Coffee, Decaffeinated coffee, Tea (without milk or non-dairy creamer)  Carbonated beverages (pop)  Water  Sports Drinks    Desserts  Jello (except red or purple)  Fruit ice  Popsicle    Fruit and Fruit Juices  Citrus juices, strained  Fruit nectars, strained  Apple juice  Fruit drinks    Soups  Broth or Bouillon  Consomme  Meat stock, strained  Vegetable broth, strained    Sweets  Hard candy, plain  Sugar    Miscellaneous  Flavorings  Salt    No red or purple colored juices or Jello  No dairy products  No foods containing seeds 2 days  prior to procedure  No alcoholic beverages               Directions to [facility]    From the North:   Take the [address] exit off of [address] (on the south side of [place]).  Turn left on [address]  Turn left at the first stoplight (at [place]).  The hospital and clinic is the third building on the left.     From the South:  Follow [address] north to the first [place] exit.  Exit on [address] following it to the right.  Turn left at the first stoplight.  The hospital and clinic is the third building on the left.    From the East:     Following [address] west, turn left at the stoplight onto [address] Follow [address] through [place].  Take a right at the light on [address] (at [facility]).  The hospital and clinic is the third building on the left.    From the West:    Following [address] going east, turn south on [address].  Take the [address] exit off of [address] (on the south side of [place]).  Follow [address] through [place] to the stoplight on [address] (at [facility]). Take a left.  The hospital and clinic is the third building on the left.        UPPER ENDOSCOPY INFORMATION  Upper endoscopy (also known as an upper GI endoscopy, esophagogastro-duodenoscopy [EGD], or panendoscopy) is a procedure that enables your physician to examine the lining of the upper part of your gastrointestinal tract, ie. The esophagus (swallowing tube), stomach, and duodenum (first portion of the small intestine) using a thin flexible tube with its own lens and light source.    Upper endoscopy is usually performed to evaluate symptoms of persistent upper abdominal pain, nausea, vomiting or difficulty swallowing.  It is also the best test for finding the cause of bleeding from the upper gastrointestinal tract.    Upper endoscopy is more accurate than x-ray films for detecting inflammation, ulcers or tumors of the esophagus, stomach and duodenum.  Upper  endoscopy can detect early cancer and can distinguish between benign (non-cancerous) and malignant (cancerous) conditions when biopsies (small tissue samples) of suspicious areas are obtained.  Biopsies are taken for many reasons and do not necessarily mean that cancer is suspected.  A cytology test (introduction of a small brush to collect cells) may also be performed.    Upper endoscopy is also used to treat conditions present in the upper gastrointestinal tract.  A variety of instruments can be passed through the endoscope that allow many abnormalities to be treated directly with little or no discomfort.  This may include stretching narrowed areas, removing polyps (usually benign growths) or swallowed objects, or treating upper gastrointestinal bleeding.  Safe and effective endoscopic control of bleeding has reduced the need for transfusions and surgery in many patients.    For the best and safest examination, the stomach must be completely empty.  You should have nothing to eat or drink, including water, for approximately 4 hours prior to your examination.    WHAT CAN BE EXPECTED DURING THE UPPER ENDOSCOPY?  Your doctor will review with you why upper endoscopy is being performed, whether any alternative tests are available, and possible complications from the procedure.  Your throat will be sprayed with a local anesthetic before the procedure begins and you may be given medication through a vein to help you relax during the procedure.  While you are in a comfortable position on your side, the endoscope is passed through the mouth and then is passed in turn through the esophagus, stomach, and duodenum.  The endoscope does not interfere with your breathing during the test.  Most patients consider the procedure to be only slightly uncomfortable and many patients fall asleep during the procedure.    WHAT HAPPENS AFTER THE UPPER ENDOSCOPY?  After the procedure, you will be monitored in the endoscopy area until most  of the effects of the medication have work off.  Your throat may be a little sore for a while, and you may feel bloated right after the procedure because of the air introduced into your stomach during the test.  You will be able to resume your diet after you leave unless you are instructed otherwise.    If you receive sedation, you will need to arrange to have a responsible adult drive and accompany you home from the examination because the sedative may affect your judgment and reflexes for the rest of the day.  You will not be allowed to take a taxi or bus as transportation home.  If you receive sedation, you will not be allowed to drive after the procedure even though you may not feel tired.    WHAT ARE THE POSSIBLE COMPLICATIONS OF UPPER ENDOSCOPY?  Endoscopy is generally safe.  Complications can occur but are rare when the test is performed by physicians with specialized training and experience in this procedure.  Bleeding may occur from a biopsy site or where a polyp was removed.  It is usually minimal and rarely requires blood transfusions or surgery.  Localized irritation of the vein where the medication was injected may rarely cause a tender lump lasting for several weeks, but this will go away.  Applying heat packs or hot moist towels may help relieve discomfort.  Other potential risks include a reaction to the sedatives used and complications from underlying medical conditions.  Major complication, eg, perforation (a tear that might require surgery for repair) are very uncommon.      It is important for you to recognize early signs of any possible complication.  If you begin to run a fever after the test, begin to have trouble swallowing, or have increasing throat, chest or abdominal pain, let your doctor know about it promptly.

## 2017-03-30 NOTE — NURSING NOTE
"Chief Complaint   Patient presents with     Anxiety     Panel Management       Initial /84  Pulse 112  Temp 97.4  F (36.3  C) (Temporal)  Resp 12  Ht 5' 4\" (1.626 m)  Wt 152 lb (68.9 kg)  LMP  (LMP Unknown)  BMI 26.09 kg/m2 Estimated body mass index is 26.09 kg/(m^2) as calculated from the following:    Height as of this encounter: 5' 4\" (1.626 m).    Weight as of this encounter: 152 lb (68.9 kg).  Medication Reconciliation: complete  Zenon Malik, LYSSA    "

## 2017-03-31 ENCOUNTER — TELEPHONE (OUTPATIENT)
Dept: FAMILY MEDICINE | Facility: OTHER | Age: 42
End: 2017-03-31

## 2017-03-31 ASSESSMENT — ANXIETY QUESTIONNAIRES: GAD7 TOTAL SCORE: 16

## 2017-03-31 ASSESSMENT — PATIENT HEALTH QUESTIONNAIRE - PHQ9: SUM OF ALL RESPONSES TO PHQ QUESTIONS 1-9: 11

## 2017-04-17 ENCOUNTER — SURGERY (OUTPATIENT)
Age: 42
End: 2017-04-17

## 2017-04-17 ENCOUNTER — HOSPITAL ENCOUNTER (OUTPATIENT)
Facility: CLINIC | Age: 42
Discharge: HOME | End: 2017-04-17
Attending: INTERNAL MEDICINE | Admitting: INTERNAL MEDICINE
Payer: COMMERCIAL

## 2017-04-17 VITALS
DIASTOLIC BLOOD PRESSURE: 56 MMHG | OXYGEN SATURATION: 98 % | RESPIRATION RATE: 16 BRPM | BODY MASS INDEX: 26.09 KG/M2 | TEMPERATURE: 98.4 F | SYSTOLIC BLOOD PRESSURE: 103 MMHG | WEIGHT: 152 LBS

## 2017-04-17 LAB — UPPER GI ENDOSCOPY: NORMAL

## 2017-04-17 PROCEDURE — 43235 EGD DIAGNOSTIC BRUSH WASH: CPT | Performed by: INTERNAL MEDICINE

## 2017-04-17 PROCEDURE — 25000125 ZZHC RX 250: Performed by: INTERNAL MEDICINE

## 2017-04-17 PROCEDURE — 25000128 H RX IP 250 OP 636: Performed by: INTERNAL MEDICINE

## 2017-04-17 PROCEDURE — 40000296 ZZH STATISTIC ENDO RECOVERY CLASS 1:2 FIRST HOUR: Performed by: INTERNAL MEDICINE

## 2017-04-17 RX ORDER — FENTANYL CITRATE 50 UG/ML
INJECTION, SOLUTION INTRAMUSCULAR; INTRAVENOUS PRN
Status: DISCONTINUED | OUTPATIENT
Start: 2017-04-17 | End: 2017-04-17 | Stop reason: HOSPADM

## 2017-04-17 RX ORDER — ONDANSETRON 2 MG/ML
4 INJECTION INTRAMUSCULAR; INTRAVENOUS
Status: DISCONTINUED | OUTPATIENT
Start: 2017-04-17 | End: 2017-04-17 | Stop reason: HOSPADM

## 2017-04-17 RX ORDER — LIDOCAINE 40 MG/G
CREAM TOPICAL
Status: DISCONTINUED | OUTPATIENT
Start: 2017-04-17 | End: 2017-04-17 | Stop reason: HOSPADM

## 2017-04-17 RX ADMIN — MIDAZOLAM HYDROCHLORIDE 2 MG: 1 INJECTION, SOLUTION INTRAMUSCULAR; INTRAVENOUS at 12:04

## 2017-04-17 RX ADMIN — MIDAZOLAM HYDROCHLORIDE 1 MG: 1 INJECTION, SOLUTION INTRAMUSCULAR; INTRAVENOUS at 12:06

## 2017-04-17 RX ADMIN — MIDAZOLAM HYDROCHLORIDE 1 MG: 1 INJECTION, SOLUTION INTRAMUSCULAR; INTRAVENOUS at 12:05

## 2017-04-17 RX ADMIN — MIDAZOLAM HYDROCHLORIDE 1 MG: 1 INJECTION, SOLUTION INTRAMUSCULAR; INTRAVENOUS at 12:12

## 2017-04-17 RX ADMIN — MIDAZOLAM HYDROCHLORIDE 1 MG: 1 INJECTION, SOLUTION INTRAMUSCULAR; INTRAVENOUS at 12:08

## 2017-04-17 RX ADMIN — FENTANYL CITRATE 25 MCG: 50 INJECTION, SOLUTION INTRAMUSCULAR; INTRAVENOUS at 12:19

## 2017-04-17 RX ADMIN — FENTANYL CITRATE 25 MCG: 50 INJECTION, SOLUTION INTRAMUSCULAR; INTRAVENOUS at 12:17

## 2017-04-17 RX ADMIN — LIDOCAINE HYDROCHLORIDE 5 ML: 20 SOLUTION ORAL; TOPICAL at 12:04

## 2017-04-17 RX ADMIN — MIDAZOLAM HYDROCHLORIDE 2 MG: 1 INJECTION, SOLUTION INTRAMUSCULAR; INTRAVENOUS at 12:10

## 2017-04-17 RX ADMIN — FENTANYL CITRATE 50 MCG: 50 INJECTION, SOLUTION INTRAMUSCULAR; INTRAVENOUS at 12:08

## 2017-04-17 RX ADMIN — LIDOCAINE HYDROCHLORIDE 1 ML: 10 INJECTION, SOLUTION EPIDURAL; INFILTRATION; INTRACAUDAL; PERINEURAL at 11:52

## 2017-04-17 RX ADMIN — MIDAZOLAM HYDROCHLORIDE 2 MG: 1 INJECTION, SOLUTION INTRAMUSCULAR; INTRAVENOUS at 12:13

## 2017-04-17 RX ADMIN — FENTANYL CITRATE 50 MCG: 50 INJECTION, SOLUTION INTRAMUSCULAR; INTRAVENOUS at 12:04

## 2017-04-17 NOTE — CONSULTS
[facility] GI Pre-Procedure Physical Assessment    Radha Beckwith MRN# [id number]   Age: 41 year old YOB: 1975      Date of Surgery: 4/17/2017  Location [facility]      Date of Exam 4/17/2017 Facility (Same day)       Home clinic: [facility]  Primary care provider: Kassandra Bergman         Active problem list:   Patient Active Problem List   Diagnosis     GERD (gastroesophageal reflux disease)     Restless legs     Anxiety     Ingrowing nail     CARDIOVASCULAR SCREENING; LDL GOAL LESS THAN 160     Hypokalemia     Atypical chest pain     Tobacco abuse     Benign essential hypertension     Gastric ulcer     Anxiety attack     Opioid dependence in remission (H)            Medications (include herbals and vitamins):   Any Plavix use in the last 7 days?  No     Current Facility-Administered Medications   Medication     lidocaine 1 % 1 mL     lidocaine (LMX4) kit     sodium chloride (PF) 0.9% PF flush 3 mL     sodium chloride (PF) 0.9% PF flush 3 mL     sodium chloride (PF) 0.9% PF flush 3 mL     ondansetron (ZOFRAN) injection 4 mg             Allergies:      Allergies   Allergen Reactions     Cafergot      12-            GI problems-     Compazine      Droperidol      Nubain [Nalbuphine Hcl]      Sumatriptan      vomits after giving herself a shot     Prochlorperazine Palpitations     Uncontrolled movement     Allergy to Latex?  No  Allergy to tape?    No          Social History:     Social History   Substance Use Topics     Smoking status: Current Every Day Smoker     Packs/day: 0.50     Types: Cigarettes     Smokeless tobacco: Never Used     Alcohol use Yes      Comment: occasional drinks            Physical Exam:   All vitals have been reviewed  Blood pressure (!) 132/91, temperature 98.4  F (36.9  C), temperature source Oral, resp. rate 14, weight 68.9 kg (152 lb), SpO2 99 %, not currently breastfeeding.  Airway assessment:   Patient is able  to open mouth wide  Patient is able to stick out tongue      Lungs:   No increased work of breathing, good air exchange, clear to auscultation bilaterally, no crackles or wheezing      Cardiovascular:   Normal apical impulse, regular rate and rhythm, normal S1 and S2, no S3 or S4, and no murmur noted           Lab / Radiology Results:   All laboratory data reviewed          Assessment:   Appropriately NPO  Chief complaint or anatomic assessment of involved area: reflux         Plan:   Moderate (conscious) sedation     Patient's active problems diagnostically and therapeutically optimized for the planned procedure  Risks, benefits, alternatives to sedation and blood explained and consent obtained  Risks, benefits, alternatives to procedure explained and consent obtained  Orders and progress notes are in the chart  Discharge from Phase 1 and / or Phase 2 recovery when patient meets criteria    I have reviewed the history and physical, lab finding(s), diagnostic data, medicaitons, and the plan for sedation.  I have determined this patient to be an appropriate candidate for the planned sedation / procedure and have reassessed the patient immediately prior to sedation / procedure.    I have personally and medically directed the administration of medications used.    Ibrahima Esposito MD

## 2017-04-20 DIAGNOSIS — F41.0 ANXIETY ATTACK: ICD-10-CM

## 2017-04-20 NOTE — TELEPHONE ENCOUNTER
Ondansetron (Zofran) 4 MG      Last Written Prescription Date: 3/13/17  Last Fill Quantity: 18,  # refills: 0   Last Office Visit with [facility], [facility] or [facility] prescribing provider: 3/30/17

## 2017-04-24 RX ORDER — ONDANSETRON 4 MG/1
TABLET, FILM COATED ORAL
Qty: 18 TABLET | Refills: 10 | Status: SHIPPED | OUTPATIENT
Start: 2017-04-24 | End: 2017-10-16

## 2017-04-28 DIAGNOSIS — K21.9 GASTROESOPHAGEAL REFLUX DISEASE, ESOPHAGITIS PRESENCE NOT SPECIFIED: ICD-10-CM

## 2017-04-28 NOTE — TELEPHONE ENCOUNTER
Omeprazole 20       Last Written Prescription Date: 03/31/2016  Last Fill Quantity: 60,  # refills: 10   Last Office Visit with [facility], [facility] or [facility] prescribing provider: 03/30/2017        Aries Regards,  Jeannette Anderson [facility]

## 2017-05-01 RX ORDER — NICOTINE POLACRILEX 4 MG/1
20 GUM, CHEWING ORAL 2 TIMES DAILY
Qty: 60 TABLET | Refills: 3 | Status: SHIPPED | OUTPATIENT
Start: 2017-05-01 | End: 2017-12-29

## 2017-05-01 NOTE — TELEPHONE ENCOUNTER
Routing refill request to provider for review/approval because:  Dr. De Anda has not seen patient in last year.  Last prescribed by Dr. De Anda 2/2016.  Routing to patient's current provider Dr. Chino for review.    Thanks!    Sherrie German [facility] Pharmacist  On Behalf of [facility]

## 2017-05-22 DIAGNOSIS — F41.9 ANXIETY: ICD-10-CM

## 2017-05-22 NOTE — TELEPHONE ENCOUNTER
Pt was asked to follow up by the end of May.  Please schedule follow up before this can be refilled (enough to get to her scheduled visit).

## 2017-05-22 NOTE — TELEPHONE ENCOUNTER
sertraline (ZOLOFT) 50 MG tablet     Last Written Prescription Date: 3/30/17  Last Fill Quantity: 30, # refills: 1  Last Office Visit with G primary care provider:  3/30/17        Last PHQ-9 score on record=   PHQ-9 SCORE 3/30/2017   Total Score 11

## 2017-05-22 NOTE — TELEPHONE ENCOUNTER
Ativan      Last Written Prescription Date: 3-30-17  Last Fill Quantity: 20,  # refills: 0   Last Office Visit with [facility], [facility] or [facility] prescribing provider: 3-30-17    [facility]  Pharmacy [facility]  On Behalf of [facility]

## 2017-05-23 RX ORDER — LORAZEPAM 1 MG/1
0.5 TABLET ORAL EVERY 8 HOURS PRN
Qty: 20 TABLET | Refills: 0
Start: 2017-05-23 | End: 2017-06-08

## 2017-05-23 NOTE — TELEPHONE ENCOUNTER
Called into [facility] pharmacy. Patient informed of refill. Closing encounter    Lucy Paris MA

## 2017-05-23 NOTE — TELEPHONE ENCOUNTER
Pt is scheduled on 6/8/17 with Dr Chino. Please send a refill to pharmacy. Pt was informed that remaining refills will be given at [facility].    May Silva CMA ([facility])

## 2017-05-24 NOTE — TELEPHONE ENCOUNTER
Routing refill request to provider for review/approval because:  Patient newly started on this medication and was to follow up in 1-2 months   Patient has a f/u scheduled for 6/8/2017  Peyton Mercado RN

## 2017-06-05 NOTE — PROGRESS NOTES
SUBJECTIVE:                                                    Radha Beckwith is a 41 year old female who presents to clinic today for the following health issues:      Hypertension Follow-up      Outpatient blood pressures are not being checked.    Low Salt Diet: not monitoring salt     Stopped taking atenolol, felt like it was making her feel weird.  Felt better after stopping this.    Anxiety Follow-Up    Status since last visit: Worsened, increased stress lately.  Her mother is passing away in front of her eyes.  Having to work more to get additional income.  Her son is to be deployed soon, etc.      Other associated symptoms: Picking at fingers until they bleed, shakiness    Complicating factors:   Significant life event: Yes-  Knows when he son will be deployed   Current substance abuse: None  Depression symptoms: Yes-    BO-7 SCORE 2/3/2017 3/30/2017 6/9/2017   Total Score 10 16 14        GAD7     She feels like the sertraline is helping her to feel better.  Gets irritated much less often.                Amount of exercise or physical activity: On feet everyday walking around but not too physical    Problems taking medications regularly: No    Medication side effects: atenolol she doesn't like the way it made her feel so she stopped taking it    Diet: regular (no restrictions)        Problem list and histories reviewed & adjusted, as indicated.  Additional history: as documented    Current Outpatient Prescriptions   Medication Sig Dispense Refill     sertraline (ZOLOFT) 100 MG tablet Take 1 tablet (100 mg) by mouth daily 90 tablet 1     order for DME Blood pressure cuff 1 each 0     LORazepam (ATIVAN) 1 MG tablet Take 0.5 tablets (0.5 mg) by mouth every 8 hours as needed for anxiety 20 tablet 0     omeprazole 20 MG tablet Take 1 tablet (20 mg) by mouth 2 times daily Take 30-60 minutes before a meal. 60 tablet 3     ondansetron (ZOFRAN) 4 MG tablet TAKE ONE TABLET BY MOUTH EVERY 6 HOURS AS NEEDED FOR  "NAUSEA OR VOMITING 18 tablet 10     sucralfate (CARAFATE) 1 GM tablet Take 1 tablet (1 g) by mouth 4 times daily 120 tablet 1     QUEtiapine (SEROQUEL) 50 MG tablet Take 1-2 tablets ( mg) by mouth nightly as needed 60 tablet 3     loratadine (CLARITIN) 10 MG tablet Take 10 mg by mouth daily       medroxyPROGESTERone (DEPO-PROVERA) 150 MG/ML injection Inject 1 mL (150 mg) into the muscle once for 1 dose 1 mL 0     Recent Labs   Lab Test  03/24/17   0803  10/02/15   0823   06/23/15   1610  08/19/13   1205   ALT  35   --    --   32  41   CR  0.68  0.60   < >  0.97  0.76   GFRESTIMATED  >90  Non  GFR Calc    >90  Non  GFR Calc     < >  63  85   GFRESTBLACK  >90   GFR Calc    >90   GFR Calc     < >  76  >90   POTASSIUM  3.8  3.9   < >  2.6*  4.1    < > = values in this interval not displayed.      BP Readings from Last 3 Encounters:   06/08/17 124/86   04/17/17 103/56   03/30/17 134/84    Wt Readings from Last 3 Encounters:   06/08/17 149 lb (67.6 kg)   04/17/17 152 lb (68.9 kg)   03/30/17 152 lb (68.9 kg)                    Reviewed and updated as needed this visit by clinical staff  Tobacco  Allergies  Meds  Problems  Med Hx  Surg Hx  Fam Hx  Soc Hx        Reviewed and updated as needed this visit by Provider  Allergies  Meds  Problems         ROS:  Constitutional, HEENT, cardiovascular, pulmonary, gi and gu systems are negative, except as otherwise noted.    OBJECTIVE:                                                    /86  Pulse 96  Temp 98.7  F (37.1  C) (Temporal)  Resp 16  Ht 5' 4\" (1.626 m)  Wt 149 lb (67.6 kg)  BMI 25.58 kg/m2  Body mass index is 25.58 kg/(m^2).  GENERAL: healthy, alert and no distress  NECK: no adenopathy, no asymmetry, masses, or scars and thyroid normal to palpation  RESP: lungs clear to auscultation - no rales, rhonchi or wheezes  CV: regular rate and rhythm, normal S1 S2, no S3 or S4, no murmur, " click or rub, no peripheral edema and peripheral pulses strong  ABDOMEN: soft, nontender, no hepatosplenomegaly, no masses and bowel sounds normal  MS: no gross musculoskeletal defects noted, no edema    Diagnostic Test Results:  Results for orders placed or performed during the hospital encounter of 04/17/17   UPPER GI ENDOSCOPY   Result Value Ref Range    Upper GI Endoscopy       [facility]  [place] [address] [phone]     Endoscopy Department  _______________________________________________________________________________  Patient Name: Radha Beckwith       Procedure Date: 4/17/2017 12:04 PM  MRN: [id number]                       Account Number: [id number]  YOB: 1975             Admit Type: Outpatient  Age: 41                               Room: Room 1  Gender: Female                        Note Status: Finalized  Attending MD: Ibrahima Esposito MD  Total Sedation Time:   _______________________________________________________________________________     Procedure:           Upper GI endoscopy  Indications:         Epigastric abdominal pain  Providers:           Ibrahima Esposito MD  Referring MD:        Clemente Chino MD  Medicines:           Midazolam 10 mg IV, Fentanyl 150 micrograms IV,                        Lidocaine gel  Complications:       No immediate complications.  ____________ ___________________________________________________________________  Procedure:           Pre-Anesthesia Assessment:                       - Prior to the procedure, a History and Physical was                        performed, and patient medications, allergies and                        sensitivities were reviewed. The patient's tolerance of                        previous anesthesia was reviewed.                       - ASA Grade Assessment: II - A patient with mild                        systemic disease.                       After obtaining informed  "consent, the endoscope was                        passed under direct vision. Throughout the procedure,                        the patient's blood pressure, pulse, and oxygen                        saturations were monitored continuously. The Endoscope                        was introduced through the mouth, and advanced to the                        third part of duodenum.                                                                                    Findings:       The esophagus was normal.       The stomach was normal.       The examined duodenum was normal.                                                                                   Impression:          - Normal esophagus.                       - Normal stomach.                       - Normal examined duodenum.  Recommendation:      - Discharge patient to home.                       - Due to chronic benzodiazepine use, the patient has a                        high tolerance to conscious sedation. Propofol should be                        used for future sedated exams.                                                                                     ______________________  Ibrahima Esposito MD  4/17/2017 12:24 PM  I was physically present for the entire viewing portion of the exam.Ibrahima Esposito MD  Number of Addenda: 0    Note Initiated On: 4/17/2017 12:04 PM          ASSESSMENT/PLAN:                                                      Tobacco Cessation:   reports that she has been smoking Cigarettes.  She has been smoking about 0.50 packs per day. She has never used smokeless tobacco.  Tobacco Cessation Action Plan: Information offered: Patient not interested at this time    BMI:   Estimated body mass index is 25.58 kg/(m^2) as calculated from the following:    Height as of this encounter: 5' 4\" (1.626 m).    Weight as of this encounter: 149 lb (67.6 kg).   Weight management plan: Discussed healthy diet and exercise guidelines and patient " will follow up in 6 months in clinic to re-evaluate.        ICD-10-CM    1. Anxiety F41.9 sertraline (ZOLOFT) 100 MG tablet     LORazepam (ATIVAN) 1 MG tablet     DISCONTINUED: LORazepam (ATIVAN) 1 MG tablet   2. Elevated blood pressure reading without diagnosis of hypertension R03.0 order for DME   3. Tobacco abuse Z72.0    4. CARDIOVASCULAR SCREENING; LDL GOAL LESS THAN 160 Z13.6 Comprehensive metabolic panel     Lipid panel reflex to direct LDL   5. Hyperglycemia R73.9 Comprehensive metabolic panel     Lipid panel reflex to direct LDL     1.  Not controlled.  Discussed some options.  Will increase Zoloft to try to help with this.  She's to let me know if problems.  Follow up within a few months.  2.  Recheck BP.  Pt would like to have cuff at home to use as well.  3.  Encouraged cessation.  Pt not yet ready.  4.  Discussed screening labs and health maintenance, including need for pap.  Pt is not ready to pursue this today, but states she will address soon.              Patient Instructions   Thank you for visiting [facility]    Let's increase your Zoloft to 100 mg daily to help with your mood.    Let me know if not improving.    Please get pap done ASAP.      Also due for fasting labs when convenient.    Please see me in 3-6 months for follow up, sooner if not improving.       If you had imaging scheduled please refer to your radiology prep sheet.    Appointment    Date_______________     Time_____________    Day:   M TU W TH F    With____________________________    Location_________________________    If you need medication refills, please contact your pharmacy 3 days before your prescriptions runs out. If you are out of refills, your pharmacy will contact contact the clinic.    Contact us or return if questions or concerns.     -Your Care Team:  MD Peyton Casarez PA-C Folake Falaki, MD Anoshirvan Mazhari, MD Gisselle Linn, CNP    General information about your clinic       Clinic hours:     Lab hours:  Phone [phone]  Monday 7:30 am-7 pm    Monday 8:30 am-6:30 pm  Tuesday-Friday 7:30 am-5 pm   Tuesday-Friday 8:30 am-4:30 pm    Pharmacy hours:  Phone [phone]  Monday 8:30 am-7pm  Tuesday-Friday 8:30am-6 pm                                       Mychart assistance [phone]        We would like to hear from you, how was your visit today?    Angela Jacinto  Patient Information Supervisor   Patient Care Supervisor  [facility], and [facility], and [facility]  [phone]         Clemente Chino MD, MD  [facility]

## 2017-06-08 ENCOUNTER — TELEPHONE (OUTPATIENT)
Dept: FAMILY MEDICINE | Facility: CLINIC | Age: 42
End: 2017-06-08

## 2017-06-08 ENCOUNTER — OFFICE VISIT (OUTPATIENT)
Dept: FAMILY MEDICINE | Facility: OTHER | Age: 42
End: 2017-06-08
Payer: COMMERCIAL

## 2017-06-08 VITALS
WEIGHT: 149 LBS | TEMPERATURE: 98.7 F | RESPIRATION RATE: 16 BRPM | BODY MASS INDEX: 25.44 KG/M2 | SYSTOLIC BLOOD PRESSURE: 124 MMHG | HEART RATE: 96 BPM | DIASTOLIC BLOOD PRESSURE: 86 MMHG | HEIGHT: 64 IN

## 2017-06-08 DIAGNOSIS — Z72.0 TOBACCO ABUSE: ICD-10-CM

## 2017-06-08 DIAGNOSIS — R03.0 ELEVATED BLOOD PRESSURE READING WITHOUT DIAGNOSIS OF HYPERTENSION: ICD-10-CM

## 2017-06-08 DIAGNOSIS — F41.9 ANXIETY: Primary | ICD-10-CM

## 2017-06-08 DIAGNOSIS — Z13.6 CARDIOVASCULAR SCREENING; LDL GOAL LESS THAN 160: ICD-10-CM

## 2017-06-08 DIAGNOSIS — R73.9 HYPERGLYCEMIA: ICD-10-CM

## 2017-06-08 PROCEDURE — 99214 OFFICE O/P EST MOD 30 MIN: CPT | Performed by: FAMILY MEDICINE

## 2017-06-08 RX ORDER — LORAZEPAM 1 MG/1
0.5 TABLET ORAL EVERY 8 HOURS PRN
Qty: 20 TABLET | Refills: 0 | Status: SHIPPED | OUTPATIENT
Start: 2017-06-08 | End: 2017-07-25

## 2017-06-08 RX ORDER — LORAZEPAM 1 MG/1
0.5 TABLET ORAL EVERY 8 HOURS PRN
Qty: 20 TABLET | Refills: 0 | Status: SHIPPED | OUTPATIENT
Start: 2017-06-08 | End: 2017-06-08

## 2017-06-08 RX ORDER — SERTRALINE HYDROCHLORIDE 100 MG/1
100 TABLET, FILM COATED ORAL DAILY
Qty: 90 TABLET | Refills: 1 | Status: SHIPPED | OUTPATIENT
Start: 2017-06-08 | End: 2017-10-16

## 2017-06-08 ASSESSMENT — PAIN SCALES - GENERAL: PAINLEVEL: NO PAIN (0)

## 2017-06-08 NOTE — MR AVS SNAPSHOT
After Visit Summary   6/8/2017    Radha Beckwith    MRN: [id number]           Patient Information     Date Of Birth          1975        Visit Information        Provider Department      6/8/2017 2:15 PM Clemente Chino MD [facility]        Today's Diagnoses     Anxiety    -  1    Elevated blood pressure reading without diagnosis of hypertension        Tobacco abuse        CARDIOVASCULAR SCREENING; LDL GOAL LESS THAN 160        Hyperglycemia          Care Instructions    Thank you for visiting [facility]    Let's increase your Zoloft to 100 mg daily to help with your mood.    Let me know if not improving.    Please get pap done ASAP.      Also due for fasting labs when convenient.    Please see me in 3-6 months for follow up, sooner if not improving.       If you had imaging scheduled please refer to your radiology prep sheet.    Appointment    Date_______________     Time_____________    Day:   M TU W TH F    With____________________________    Location_________________________    If you need medication refills, please contact your pharmacy 3 days before your prescriptions runs out. If you are out of refills, your pharmacy will contact contact the clinic.    Contact us or return if questions or concerns.     -Your Care Team:  MD Peyton Casraez PA-C Folake Falaki, MD Anoshirvan Mazhari, MD Kelly White, JEN    General information about your clinic      Clinic hours:     Lab hours:  Phone [phone]  Monday 7:30 am-7 pm    Monday 8:30 am-6:30 pm  Tuesday-Friday 7:30 am-5 pm   Tuesday-Friday 8:30 am-4:30 pm    Pharmacy hours:  Phone [phone]  Monday 8:30 am-7pm  Tuesday-Friday 8:30am-6 pm                                       Mychart assistance [phone]        We would like to hear from you, how was your visit today?    Angela Jacinto  Patient Information Supervisor   Patient Care  "Supervisor  [facility], and [facility], and [facility]  [phone]             Follow-ups after your visit        Who to contact     If you have questions or need follow up information about today's clinic visit or your schedule please contact [facility] directly at [phone].  Normal or non-critical lab and imaging results will be communicated to you by [employer], letter or phone within 4 business days after the clinic has received the results. If you do not hear from us within 7 days, please contact the clinic through [employer] or phone. If you have a critical or abnormal lab result, we will notify you by phone as soon as possible.  Submit refill requests through [employer] or call your pharmacy and they will forward the refill request to us. Please allow 3 business days for your refill to be completed.          Additional Information About Your Visit        [employer] Information     [employer] lets you send messages to your doctor, view your test results, renew your prescriptions, schedule appointments and more. To sign up, go to [email or web address] . Click on \"Log in\" on the left side of the screen, which will take you to the Welcome page. Then click on \"Sign up Now\" on the right side of the page.     You will be asked to enter the access code listed below, as well as some personal information. Please follow the directions to create your username and password.     Your access code is: [email or web address]  Expires: 2017  2:47 PM     Your access code will  in 90 days. If you need help or a new code, please call your [place] clinic or [phone].        Care EveryWhere ID     This is your Care EveryWhere ID. This could be used by other organizations to access your [place] medical records  [id number]        Your Vitals Were     Pulse Temperature Respirations Height BMI (Body Mass Index)       96 98.7  F (37.1  C) (Temporal) 16 5' 4\" " (1.626 m) 25.58 kg/m2        Blood Pressure from Last 3 Encounters:   06/08/17 124/86   04/17/17 103/56   03/30/17 134/84    Weight from Last 3 Encounters:   06/08/17 149 lb (67.6 kg)   04/17/17 152 lb (68.9 kg)   03/30/17 152 lb (68.9 kg)              Today, you had the following     No orders found for display         Today's Medication Changes          These changes are accurate as of: 6/8/17  2:47 PM.  If you have any questions, ask your nurse or doctor.               These medicines have changed or have updated prescriptions.        Dose/Directions    sertraline 100 MG tablet   Commonly known as:  ZOLOFT   This may have changed:  See the new instructions.   Used for:  Anxiety   Changed by:  Clemente Chino MD        Dose:  100 mg   Take 1 tablet (100 mg) by mouth daily   Quantity:  90 tablet   Refills:  1            Where to get your medicines      These medications were sent to [facility] [place] - [address]   [address] [place] [address]     Phone:  [phone]     sertraline 100 MG tablet         Some of these will need a paper prescription and others can be bought over the counter.  Ask your nurse if you have questions.     Bring a paper prescription for each of these medications     LORazepam 1 MG tablet                Primary Care Provider Office Phone #    [facility] [phone]       No address on file        Thank you!     Thank you for choosing [facility]  for your care. Our goal is always to provide you with excellent care. Hearing back from our patients is one way we can continue to improve our services. Please take a few minutes to complete the written survey that you may receive in the mail after your visit with us. Thank you!             Your Updated Medication List - Protect others around you: Learn how to safely use, store and throw away your medicines at www.disposemymeds.org.          This list is accurate as  of: 6/8/17  2:47 PM.  Always use your most recent med list.                   Brand Name Dispense Instructions for use    loratadine 10 MG tablet    CLARITIN     Take 10 mg by mouth daily       LORazepam 1 MG tablet    ATIVAN    20 tablet    Take 0.5 tablets (0.5 mg) by mouth every 8 hours as needed for anxiety       medroxyPROGESTERone 150 MG/ML injection    DEPO-PROVERA    1 mL    Inject 1 mL (150 mg) into the muscle once for 1 dose       omeprazole 20 MG tablet     60 tablet    Take 1 tablet (20 mg) by mouth 2 times daily Take 30-60 minutes before a meal.       ondansetron 4 MG tablet    ZOFRAN    18 tablet    TAKE ONE TABLET BY MOUTH EVERY 6 HOURS AS NEEDED FOR NAUSEA OR VOMITING       QUEtiapine 50 MG tablet    SEROQUEL    60 tablet    Take 1-2 tablets ( mg) by mouth nightly as needed       sertraline 100 MG tablet    ZOLOFT    90 tablet    Take 1 tablet (100 mg) by mouth daily       sucralfate 1 GM tablet    CARAFATE    120 tablet    Take 1 tablet (1 g) by mouth 4 times daily

## 2017-06-08 NOTE — TELEPHONE ENCOUNTER
Panel Management Review      Patient has the following on her problem list: None      Composite cancer screening  Chart review shows that this patient is due/due soon for the following Pap Smear and Mammogram  Summary:    Patient is due/failing the following:   Lipids, Pap, Mammogram    Action needed:   Patient needs office visit for follow up and she is scheduled for 06/08/17 and I left message informing her she is also due for Pap, Mammogram, and fasting cholesterol check.    Type of outreach:    Phone, left message informing pt that she's due for fasting labs, Pap, and  Mammogram.  She has appt scheduled today 06/08/17.    Questions for provider review:    None                                                                                                                                    Zenon Malik, LYSSA       Chart routed to Closed .

## 2017-06-08 NOTE — NURSING NOTE
"Chief Complaint   Patient presents with     Ativan follow up     Panel Management     MyChart, Pap, Mammogram, Lipids, phq, aletha       Initial /88 (BP Location: Left arm, Patient Position: Chair, Cuff Size: Adult Regular)  Pulse 96  Temp 98.7  F (37.1  C) (Temporal)  Resp 16  Ht 5' 4\" (1.626 m)  Wt 149 lb (67.6 kg)  BMI 25.58 kg/m2 Estimated body mass index is 25.58 kg/(m^2) as calculated from the following:    Height as of this encounter: 5' 4\" (1.626 m).    Weight as of this encounter: 149 lb (67.6 kg).  Medication Reconciliation: complete  Zenon Malik CMA    "

## 2017-06-08 NOTE — PATIENT INSTRUCTIONS
Thank you for visiting [facility]    Let's increase your Zoloft to 100 mg daily to help with your mood.    Let me know if not improving.    Please get pap done ASAP.      Also due for fasting labs when convenient.    Please see me in 3-6 months for follow up, sooner if not improving.       If you had imaging scheduled please refer to your radiology prep sheet.    Appointment    Date_______________     Time_____________    Day:   M TU W TH F    With____________________________    Location_________________________    If you need medication refills, please contact your pharmacy 3 days before your prescriptions runs out. If you are out of refills, your pharmacy will contact contact the clinic.    Contact us or return if questions or concerns.     -Your Care Team:  MD Peyton Casarez PA-C Folake Falaki, MD Anoshirvan Mazhari, MD Kelly White, JEN    General information about your clinic      Clinic hours:     Lab hours:  Phone [phone]  Monday 7:30 am-7 pm    Monday 8:30 am-6:30 pm  Tuesday-Friday 7:30 am-5 pm   Tuesday-Friday 8:30 am-4:30 pm    Pharmacy hours:  Phone [phone]  Monday 8:30 am-7pm  Tuesday-Friday 8:30am-6 pm                                       Mychart assistance [phone]        We would like to hear from you, how was your visit today?    Angela Jacinto  Patient Information Supervisor   Patient Care Supervisor  [facility], and [facility], and [facility]  [phone]

## 2017-06-09 ASSESSMENT — ANXIETY QUESTIONNAIRES
IF YOU CHECKED OFF ANY PROBLEMS ON THIS QUESTIONNAIRE, HOW DIFFICULT HAVE THESE PROBLEMS MADE IT FOR YOU TO DO YOUR WORK, TAKE CARE OF THINGS AT HOME, OR GET ALONG WITH OTHER PEOPLE: VERY DIFFICULT
3. WORRYING TOO MUCH ABOUT DIFFERENT THINGS: MORE THAN HALF THE DAYS
7. FEELING AFRAID AS IF SOMETHING AWFUL MIGHT HAPPEN: NEARLY EVERY DAY
2. NOT BEING ABLE TO STOP OR CONTROL WORRYING: MORE THAN HALF THE DAYS
6. BECOMING EASILY ANNOYED OR IRRITABLE: SEVERAL DAYS
1. FEELING NERVOUS, ANXIOUS, OR ON EDGE: NEARLY EVERY DAY
GAD7 TOTAL SCORE: 14
5. BEING SO RESTLESS THAT IT IS HARD TO SIT STILL: SEVERAL DAYS

## 2017-06-09 ASSESSMENT — PATIENT HEALTH QUESTIONNAIRE - PHQ9: 5. POOR APPETITE OR OVEREATING: MORE THAN HALF THE DAYS

## 2017-06-10 ASSESSMENT — ANXIETY QUESTIONNAIRES: GAD7 TOTAL SCORE: 14

## 2017-06-10 ASSESSMENT — PATIENT HEALTH QUESTIONNAIRE - PHQ9: SUM OF ALL RESPONSES TO PHQ QUESTIONS 1-9: 7

## 2017-06-30 ENCOUNTER — TELEPHONE (OUTPATIENT)
Dept: FAMILY MEDICINE | Facility: OTHER | Age: 42
End: 2017-06-30

## 2017-06-30 DIAGNOSIS — F41.9 ANXIETY: ICD-10-CM

## 2017-06-30 RX ORDER — LORAZEPAM 1 MG/1
0.5 TABLET ORAL EVERY 8 HOURS PRN
Qty: 20 TABLET | Refills: 0 | OUTPATIENT
Start: 2017-06-30

## 2017-06-30 NOTE — TELEPHONE ENCOUNTER
I spoke with pt and informed her of the message below.  She said she will need to talk to her boss (he's not at work today).    Zenon Malik, CMA

## 2017-06-30 NOTE — TELEPHONE ENCOUNTER
Lorazepam      Last Written Prescription Date:  06/08/2017  Last Fill Quantity: 20,   # refills: 0  Last Office Visit with [facility], [facility] or  Health prescribing provider: 06/08/2017  Future Office visit:       Routing refill request to provider for review/approval because:  Drug not on the [facility], [facility] or  [employer] refill protocol or controlled substance    Lucy Paris MA
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
## 2017-07-07 ENCOUNTER — RESULT FOLLOW UP (OUTPATIENT)
Dept: OBGYN | Facility: CLINIC | Age: 42
End: 2017-07-07

## 2017-07-07 ENCOUNTER — OFFICE VISIT (OUTPATIENT)
Dept: FAMILY MEDICINE | Facility: CLINIC | Age: 42
End: 2017-07-07
Payer: COMMERCIAL

## 2017-07-07 VITALS
SYSTOLIC BLOOD PRESSURE: 120 MMHG | HEIGHT: 65 IN | HEART RATE: 80 BPM | WEIGHT: 151 LBS | DIASTOLIC BLOOD PRESSURE: 78 MMHG | BODY MASS INDEX: 25.16 KG/M2 | TEMPERATURE: 98.1 F

## 2017-07-07 DIAGNOSIS — J30.2 SEASONAL ALLERGIC RHINITIS, UNSPECIFIED CHRONICITY, UNSPECIFIED TRIGGER: ICD-10-CM

## 2017-07-07 DIAGNOSIS — R87.612 PAPANICOLAOU SMEAR OF CERVIX WITH LOW GRADE SQUAMOUS INTRAEPITHELIAL LESION (LGSIL): ICD-10-CM

## 2017-07-07 DIAGNOSIS — Z00.00 ROUTINE GENERAL MEDICAL EXAMINATION AT A HEALTH CARE FACILITY: Primary | ICD-10-CM

## 2017-07-07 DIAGNOSIS — Z30.42 ENCOUNTER FOR SURVEILLANCE OF INJECTABLE CONTRACEPTIVE: ICD-10-CM

## 2017-07-07 LAB — BETA HCG QUAL IFA URINE: NEGATIVE

## 2017-07-07 PROCEDURE — 99396 PREV VISIT EST AGE 40-64: CPT | Mod: 25 | Performed by: NURSE PRACTITIONER

## 2017-07-07 PROCEDURE — 99213 OFFICE O/P EST LOW 20 MIN: CPT | Mod: 25 | Performed by: NURSE PRACTITIONER

## 2017-07-07 PROCEDURE — G0476 HPV COMBO ASSAY CA SCREEN: HCPCS | Performed by: NURSE PRACTITIONER

## 2017-07-07 PROCEDURE — 84703 CHORIONIC GONADOTROPIN ASSAY: CPT | Performed by: NURSE PRACTITIONER

## 2017-07-07 PROCEDURE — 87624 HPV HI-RISK TYP POOLED RSLT: CPT | Performed by: NURSE PRACTITIONER

## 2017-07-07 PROCEDURE — 96372 THER/PROPH/DIAG INJ SC/IM: CPT | Performed by: NURSE PRACTITIONER

## 2017-07-07 PROCEDURE — G0145 SCR C/V CYTO,THINLAYER,RESCR: HCPCS | Performed by: NURSE PRACTITIONER

## 2017-07-07 PROCEDURE — G0124 SCREEN C/V THIN LAYER BY MD: HCPCS | Performed by: NURSE PRACTITIONER

## 2017-07-07 RX ORDER — CETIRIZINE HYDROCHLORIDE 10 MG/1
10 TABLET ORAL EVERY EVENING
Qty: 30 TABLET | Refills: 3 | Status: SHIPPED | OUTPATIENT
Start: 2017-07-07 | End: 2017-08-09

## 2017-07-07 RX ORDER — MEDROXYPROGESTERONE ACETATE 150 MG/ML
150 INJECTION, SUSPENSION INTRAMUSCULAR
Qty: 3 ML | Refills: 3 | Status: ON HOLD | COMMUNITY
Start: 2017-07-07 | End: 2017-10-30

## 2017-07-07 NOTE — MR AVS SNAPSHOT
After Visit Summary   7/7/2017    Radha Beckwith    MRN: [id number]           Patient Information     Date Of Birth          1975        Visit Information        Provider Department      7/7/2017 10:30 AM Mirian Vergara APRN [facility]        Today's Diagnoses     Routine general medical examination at a health care facility    -  1    Encounter for surveillance of injectable contraceptive        Seasonal allergic rhinitis, unspecified chronicity, unspecified trigger          Care Instructions      Preventive Health Recommendations  Female Ages 40 to 49    Yearly exam:     See your health care provider every year in order to  1. Review health changes.   2. Discuss preventive care.    3. Review your medicines if your doctor prescribed any.      Get a Pap test every three years (unless you have an abnormal result and your provider advises testing more often).      If you get Pap tests with HPV test, you only need to test every 5 years, unless you have an abnormal result. You do not need a Pap test if your uterus was removed (hysterectomy) and you have not had cancer.      You should be tested each year for STDs (sexually transmitted diseases), if you're at risk.       Ask your doctor if you should have a mammogram.      Have a colonoscopy (test for colon cancer) if someone in your family has had colon cancer or polyps before age 50.       Have a cholesterol test every 5 years.       Have a diabetes test (fasting glucose) after age 45. If you are at risk for diabetes, you should have this test every 3 years.    Shots: Get a flu shot each year. Get a tetanus shot every 10 years.     Nutrition:     Eat at least 5 servings of fruits and vegetables each day.    Eat whole-grain bread, whole-wheat pasta and brown rice instead of white grains and rice.    Talk to your provider about Calcium and Vitamin D.     Lifestyle    Exercise at least 150 minutes a week (an average of 30  "minutes a day, 5 days a week). This will help you control your weight and prevent disease.    Limit alcohol to one drink per day.    No smoking.     Wear sunscreen to prevent skin cancer.    See your dentist every six months for an exam and cleaning.          Follow-ups after your visit        Who to contact     If you have questions or need follow up information about today's clinic visit or your schedule please contact [facility] directly at [phone].  Normal or non-critical lab and imaging results will be communicated to you by Ubertestershart, letter or phone within 4 business days after the clinic has received the results. If you do not hear from us within 7 days, please contact the clinic through Ubertestershart or phone. If you have a critical or abnormal lab result, we will notify you by phone as soon as possible.  Submit refill requests through TipHive or call your pharmacy and they will forward the refill request to us. Please allow 3 business days for your refill to be completed.          Additional Information About Your Visit        TipHive Information     TipHive lets you send messages to your doctor, view your test results, renew your prescriptions, schedule appointments and more. To sign up, go to [email or web address] . Click on \"Log in\" on the left side of the screen, which will take you to the Welcome page. Then click on \"Sign up Now\" on the right side of the page.     You will be asked to enter the access code listed below, as well as some personal information. Please follow the directions to create your username and password.     Your access code is: [email or web address]  Expires: 2017  2:47 PM     Your access code will  in 90 days. If you need help or a new code, please call your [place] clinic or [phone].        Care EveryWhere ID     This is your Care EveryWhere ID. This could be used by other organizations to access your [place] medical records  [id number]        Your Vitals " "Were     Pulse Temperature Height BMI (Body Mass Index)          80 98.1  F (36.7  C) (Tympanic) 5' 5.1\" (1.654 m) 25.05 kg/m2         Blood Pressure from Last 3 Encounters:   07/07/17 120/78   06/08/17 124/86   04/17/17 103/56    Weight from Last 3 Encounters:   07/07/17 151 lb (68.5 kg)   06/08/17 149 lb (67.6 kg)   04/17/17 152 lb (68.9 kg)              We Performed the Following     Beta HCG qual IFA urine     HPV High Risk Types DNA Cervical     INJECTION INTRAMUSCULAR OR SUB-Q     Medroxyprogesterone inj  1mg   (Depo Provera J-Code)     Pap imaged thin layer screen with HPV - recommended age 30 - 65 years (select HPV order below)          Today's Medication Changes          These changes are accurate as of: 7/7/17 11:46 AM.  If you have any questions, ask your nurse or doctor.               Start taking these medicines.        Dose/Directions    cetirizine 10 MG tablet   Commonly known as:  zyrTEC   Used for:  Seasonal allergic rhinitis, unspecified chronicity, unspecified trigger   Started by:  Mirian Vergara APRN CNP        Dose:  10 mg   Take 1 tablet (10 mg) by mouth every evening   Quantity:  30 tablet   Refills:  3            Where to get your medicines      These medications were sent to [facility] - [address]   [address] [place] [address]     Phone:  [phone]     cetirizine 10 MG tablet                Primary Care Provider Office Phone #    [facility] [phone]       No address on file        Equal Access to Services     [facility]: Hadii quintin ku hadasho Soomaali, waaxda luqadaha, qaybta kaalmada adeegyada, yadi idiin hayaan adeeg kharash la'aan . So [facility] [phone].    ATENCIÓN: Si habla español, tiene a rueda disposición servicios gratuitos de asistencia lingüística. Llame al [phone].    We comply with applicable federal civil rights laws and [place] laws. We do not discriminate on the basis of race, color, " national origin, age, disability sex, sexual orientation or gender identity.            Thank you!     Thank you for choosing [facility]  for your care. Our goal is always to provide you with excellent care. Hearing back from our patients is one way we can continue to improve our services. Please take a few minutes to complete the written survey that you may receive in the mail after your visit with us. Thank you!             Your Updated Medication List - Protect others around you: Learn how to safely use, store and throw away your medicines at www.disposemymeds.org.          This list is accurate as of: 7/7/17 11:46 AM.  Always use your most recent med list.                   Brand Name Dispense Instructions for use Diagnosis    cetirizine 10 MG tablet    zyrTEC    30 tablet    Take 1 tablet (10 mg) by mouth every evening    Seasonal allergic rhinitis, unspecified chronicity, unspecified trigger       loratadine 10 MG tablet    CLARITIN     Take 10 mg by mouth daily        LORazepam 1 MG tablet    ATIVAN    20 tablet    Take 0.5 tablets (0.5 mg) by mouth every 8 hours as needed for anxiety    Anxiety       medroxyPROGESTERone 150 MG/ML injection    DEPO-PROVERA    3 mL    Inject 1 mL (150 mg) into the muscle every 3 months        omeprazole 20 MG tablet     60 tablet    Take 1 tablet (20 mg) by mouth 2 times daily Take 30-60 minutes before a meal.    Gastroesophageal reflux disease, esophagitis presence not specified       ondansetron 4 MG tablet    ZOFRAN    18 tablet    TAKE ONE TABLET BY MOUTH EVERY 6 HOURS AS NEEDED FOR NAUSEA OR VOMITING    Anxiety attack       order for DME     1 each    Blood pressure cuff    Elevated blood pressure reading without diagnosis of hypertension       QUEtiapine 50 MG tablet    SEROQUEL    60 tablet    Take 1-2 tablets ( mg) by mouth nightly as needed    Anxiety, Psychophysiological insomnia       sertraline 100 MG tablet    ZOLOFT    90 tablet    Take 1  tablet (100 mg) by mouth daily    Anxiety       sucralfate 1 GM tablet    CARAFATE    120 tablet    Take 1 tablet (1 g) by mouth 4 times daily    Gastroesophageal reflux disease, esophagitis presence not specified

## 2017-07-07 NOTE — NURSING NOTE
"Chief Complaint   Patient presents with     Physical     pap       Initial /78  Pulse 80  Temp 98.1  F (36.7  C) (Tympanic)  Ht 5' 5.1\" (1.654 m)  Wt 151 lb (68.5 kg)  BMI 25.05 kg/m2 Estimated body mass index is 25.05 kg/(m^2) as calculated from the following:    Height as of this encounter: 5' 5.1\" (1.654 m).    Weight as of this encounter: 151 lb (68.5 kg).  Medication Reconciliation: complete  "

## 2017-07-07 NOTE — LETTER
June 25, 2018      Radha Beckwith  [address]  [place] [address]    Dear ,      This letter is to remind you that you are due for your follow up PAP smear on or about 7/7/18.    Please call [phone] to schedule your appointment at your earliest convenience.     If you have completed the tests outside of [place], please have the results forwarded to our office. We will update the chart for your primary Physician to review before your next annual physical.     Sincerely,      JEANNIE Murillo CNP/rlm

## 2017-07-12 LAB
COPATH REPORT: ABNORMAL
PAP: ABNORMAL

## 2017-07-13 LAB
FINAL DIAGNOSIS: ABNORMAL
HPV HR 12 DNA CVX QL NAA+PROBE: POSITIVE
HPV16 DNA SPEC QL NAA+PROBE: NEGATIVE
HPV18 DNA SPEC QL NAA+PROBE: NEGATIVE
SPECIMEN DESCRIPTION: ABNORMAL

## 2017-07-14 NOTE — PROGRESS NOTES
7/7/17 LSIL pap/+ HR HPV (not 16 or 18). Plan: colp bef 10/7/17.   7/17/17 left msg  7/18/17 Pt. Advised.  8/10/17 [facility]:Focal squamous atypia; cannot exclude koilocytosis   Plan: hysterectomy.  10/30/17 Hysterectomy surgery.  Plan: Pap in 1 yr due by 7/7/18 (rl)  6/25/18 Pap reminder letter sent ([facility])  12/26/18 [facility] clinic and schedule. ([facility])  01/09/19 Patient is lost to pap tracking follow-up. FYI routed to provider. ([facility])

## 2017-07-25 DIAGNOSIS — F41.9 ANXIETY: ICD-10-CM

## 2017-07-25 NOTE — TELEPHONE ENCOUNTER
Ativan 1mg      Last Written Prescription Date:  6/8/17  Last Fill Quantity: 20,   # refills: 0  Last Office Visit with [facility], [facility] or [facility] prescribing provider: 6/8/17  Future Office visit:       Routing refill request to provider for review/approval because:  Drug not on the [facility], [facility] or [facility] refill protocol or controlled substance      Steph Reyes CPhT  [facility]    On behalf of [facility]

## 2017-07-27 RX ORDER — LORAZEPAM 1 MG/1
0.5 TABLET ORAL EVERY 8 HOURS PRN
Qty: 20 TABLET | Refills: 0 | Status: SHIPPED | OUTPATIENT
Start: 2017-07-27 | End: 2017-08-28

## 2017-08-03 ENCOUNTER — OFFICE VISIT (OUTPATIENT)
Dept: FAMILY MEDICINE | Facility: CLINIC | Age: 42
End: 2017-08-03
Payer: COMMERCIAL

## 2017-08-03 ENCOUNTER — TELEPHONE (OUTPATIENT)
Dept: OBGYN | Facility: CLINIC | Age: 42
End: 2017-08-03

## 2017-08-03 ENCOUNTER — TELEPHONE (OUTPATIENT)
Dept: FAMILY MEDICINE | Facility: OTHER | Age: 42
End: 2017-08-03

## 2017-08-03 VITALS
OXYGEN SATURATION: 100 % | BODY MASS INDEX: 25.66 KG/M2 | WEIGHT: 154 LBS | HEART RATE: 94 BPM | HEIGHT: 65 IN | RESPIRATION RATE: 16 BRPM | DIASTOLIC BLOOD PRESSURE: 84 MMHG | SYSTOLIC BLOOD PRESSURE: 118 MMHG | TEMPERATURE: 97.6 F

## 2017-08-03 DIAGNOSIS — R87.612 PAPANICOLAOU SMEAR OF CERVIX WITH LOW GRADE SQUAMOUS INTRAEPITHELIAL LESION (LGSIL): Primary | ICD-10-CM

## 2017-08-03 DIAGNOSIS — R10.2 PELVIC PAIN IN FEMALE: ICD-10-CM

## 2017-08-03 DIAGNOSIS — Z01.812 PRE-PROCEDURE LAB EXAM: Primary | ICD-10-CM

## 2017-08-03 PROCEDURE — 99214 OFFICE O/P EST MOD 30 MIN: CPT | Performed by: OBSTETRICS & GYNECOLOGY

## 2017-08-03 ASSESSMENT — PAIN SCALES - GENERAL: PAINLEVEL: NO PAIN (0)

## 2017-08-03 NOTE — TELEPHONE ENCOUNTER
Dr Norman office called to see if we can add patient on to Wednesday 8/9/2017 at 1:15 or 3pm for Preop for [place] on 8/10/2017 with Ramesh, please advise   Thank you  Jahaira DESOUZA (R)

## 2017-08-03 NOTE — TELEPHONE ENCOUNTER
Left message for patient to return call.  Working pre-op at 1 pm on 08/09/2017.    Per request of Dr. Chandler.     Erick Nicolas, RN, BSN

## 2017-08-03 NOTE — MR AVS SNAPSHOT
After Visit Summary   8/3/2017    Radha Beckwith    MRN: [id number]           Patient Information     Date Of Birth          1975        Visit Information        Provider Department      8/3/2017 10:50 AM Michael Chandler MD [facility]         Follow-ups after your visit        Your next 10 appointments already scheduled     Aug 10, 2017   Procedure with Michael Chandler MD   [facility] ([facility])    [address] [id number] [phone]           From [address]: Exit at [address] on south side of [place]. Turn right on [address] Turn left at stoplight on [address]. [facility] will be in view two blocks ahead            Aug 16, 2017 10:30 AM CDT   SHORT with Michael Chandler MD   [facility]    [address] [id number] [phone]              Who to contact     If you have questions or need follow up information about today's clinic visit or your schedule please contact [facility] directly at [phone].  Normal or non-critical lab and imaging results will be communicated to you by MyChart, letter or phone within 4 business days after the clinic has received the results. If you do not hear from us within 7 days, please contact the clinic through Ziebelhart or phone. If you have a critical or abnormal lab result, we will notify you by phone as soon as possible.  Submit refill requests through Playfire or call your pharmacy and they will forward the refill request to us. Please allow 3 business days for your refill to be completed.          Additional Information About Your Visit        MyChart Information     Playfire lets you send messages to your doctor, view your test results, renew your prescriptions, schedule appointments and more. To sign up, go to [email or web address] .  "Click on \"Log in\" on the left side of the screen, which will take you to the Welcome page. Then click on \"Sign up Now\" on the right side of the page.     You will be asked to enter the access code listed below, as well as some personal information. Please follow the directions to create your username and password.     Your access code is: [email or web address]  Expires: 2017  2:47 PM     Your access code will  in 90 days. If you need help or a new code, please call your [place] clinic or [phone].        Care EveryWhere ID     This is your Care EveryWhere ID. This could be used by other organizations to access your [place] medical records  [id number]        Your Vitals Were     Pulse Temperature Respirations Height Pulse Oximetry Breastfeeding?    94 97.6  F (36.4  C) (Tympanic) 16 5' 5\" (1.651 m) 100% No    BMI (Body Mass Index)                   25.63 kg/m2            Blood Pressure from Last 3 Encounters:   17 118/84   17 120/78   17 124/86    Weight from Last 3 Encounters:   17 154 lb (69.9 kg)   17 151 lb (68.5 kg)   17 149 lb (67.6 kg)              Today, you had the following     No orders found for display       Primary Care Provider Office Phone #    [facility] [phone]       No address on file        Equal Access to Services     [facility] GALLARDO : Hadii quintin gayo Soomaali, waaxda luqadaha, qaybta kaalmada adeegyada, yadi amezcua . So [facility] [phone].    ATENCIÓN: Si habla español, tiene a rueda disposición servicios gratuitos de asistencia lingüística. Nancy al [phone].    We comply with applicable federal civil rights laws and [place] laws. We do not discriminate on the basis of race, color, national origin, age, disability sex, sexual orientation or gender identity.            Thank you!     Thank you for choosing [facility]  for your care. Our goal is always to provide you with excellent " care. Hearing back from our patients is one way we can continue to improve our services. Please take a few minutes to complete the written survey that you may receive in the mail after your visit with us. Thank you!             Your Updated Medication List - Protect others around you: Learn how to safely use, store and throw away your medicines at www.disposemymeds.org.          This list is accurate as of: 8/3/17 11:37 AM.  Always use your most recent med list.                   Brand Name Dispense Instructions for use Diagnosis    cetirizine 10 MG tablet    zyrTEC    30 tablet    Take 1 tablet (10 mg) by mouth every evening    Seasonal allergic rhinitis, unspecified chronicity, unspecified trigger       loratadine 10 MG tablet    CLARITIN     Take 10 mg by mouth daily        LORazepam 1 MG tablet    ATIVAN    20 tablet    Take 0.5 tablets (0.5 mg) by mouth every 8 hours as needed for anxiety    Anxiety       medroxyPROGESTERone 150 MG/ML injection    DEPO-PROVERA    3 mL    Inject 1 mL (150 mg) into the muscle every 3 months        omeprazole 20 MG tablet     60 tablet    Take 1 tablet (20 mg) by mouth 2 times daily Take 30-60 minutes before a meal.    Gastroesophageal reflux disease, esophagitis presence not specified       ondansetron 4 MG tablet    ZOFRAN    18 tablet    TAKE ONE TABLET BY MOUTH EVERY 6 HOURS AS NEEDED FOR NAUSEA OR VOMITING    Anxiety attack       order for DME     1 each    Blood pressure cuff    Elevated blood pressure reading without diagnosis of hypertension       QUEtiapine 50 MG tablet    SEROQUEL    60 tablet    Take 1-2 tablets ( mg) by mouth nightly as needed    Anxiety, Psychophysiological insomnia       sertraline 100 MG tablet    ZOLOFT    90 tablet    Take 1 tablet (100 mg) by mouth daily    Anxiety       sucralfate 1 GM tablet    CARAFATE    120 tablet    Take 1 tablet (1 g) by mouth 4 times daily    Gastroesophageal reflux disease, esophagitis presence not specified

## 2017-08-03 NOTE — NURSING NOTE
"Chief Complaint   Patient presents with     Consult     colp       Initial /84 (BP Location: Left arm, Patient Position: Chair, Cuff Size: Adult Regular)  Pulse 94  Temp 97.6  F (36.4  C) (Tympanic)  Resp 16  Ht 5' 5\" (1.651 m)  Wt 154 lb (69.9 kg)  SpO2 100%  Breastfeeding? No  BMI 25.63 kg/m2 Estimated body mass index is 25.63 kg/(m^2) as calculated from the following:    Height as of this encounter: 5' 5\" (1.651 m).    Weight as of this encounter: 154 lb (69.9 kg)..   BP completed using cuff size: regular  Medication Rec Completed    Frida Navas CMA    "

## 2017-08-03 NOTE — TELEPHONE ENCOUNTER
"Surgery Scheduled    Date of Surgery 8/10/17 Time of Surgery 2:00pm  Procedure: Colposcopy with Biopsies and Endometrial Biopsy(and Exam with Ultra Sound)  Hospital/Surgical Facility: [facility]  Surgeon: Dr. Chandler  Type of Anesthesia Anticipated: MAC  Pre-Op: 8/9/17 with Dr. Chino   Pre-Op Labs: 8/9/17  \"\"Pre-op Lab Ordered\"\"  Post-Op: 8/16/17 with Dr. Chandler  Consent Signed 8/3/17      Surgery packet given to patient. Patient instructed to arrive 1 1/2 hour(s) prior to surgery.  Patient understood and agrees to the plan.      Neeraj Wilson MA      "

## 2017-08-03 NOTE — PROGRESS NOTES
Subjective: she had a recent LSIL pap with HR HPV. No history of abnormal paps. She is a smoker- has lots of personal stress in her life. Cant quit right now- son going to [place].  Also she has bilateral pelvic pain recently. She hasnt been to anyone for an exam because she gets severe anxiety with pelvic exams and she declines exam today. She is wondering about an exam under anesthesia- she gets so worked up over the exam.    She is - had 1  without complications.      The past medical history, social history, past surgical history and family history as shown below have been reviewed by me today.  Past Medical History:   Diagnosis Date     Abdominal pain, right lower quadrant 08    Admit. Discharged 03/10/08     Anxiety attack 2015     Dehydration      Gastric ulcer 2015     GERD (gastroesophageal reflux disease) 2010    Takes omeprazole and metoclopramide      Opioid dependence in remission (H) 2015     Other and unspecified ovarian cyst      Papanicolaou smear of cervix with low grade squamous intraepithelial lesion (LGSIL) 2017        Allergies   Allergen Reactions     Cafergot      2002            GI problems-     Compazine      Droperidol      Nubain [Nalbuphine Hcl]      Seasonal Allergies      Sumatriptan      vomits after giving herself a shot     Prochlorperazine Palpitations     Uncontrolled movement     Current Outpatient Prescriptions   Medication Sig Dispense Refill     LORazepam (ATIVAN) 1 MG tablet Take 0.5 tablets (0.5 mg) by mouth every 8 hours as needed for anxiety 20 tablet 0     cetirizine (ZYRTEC) 10 MG tablet Take 1 tablet (10 mg) by mouth every evening 30 tablet 3     medroxyPROGESTERone (DEPO-PROVERA) 150 MG/ML injection Inject 1 mL (150 mg) into the muscle every 3 months 3 mL 3     sertraline (ZOLOFT) 100 MG tablet Take 1 tablet (100 mg) by mouth daily 90 tablet 1     order for DME Blood pressure cuff 1 each 0     omeprazole 20 MG tablet Take 1  "tablet (20 mg) by mouth 2 times daily Take 30-60 minutes before a meal. 60 tablet 3     ondansetron (ZOFRAN) 4 MG tablet TAKE ONE TABLET BY MOUTH EVERY 6 HOURS AS NEEDED FOR NAUSEA OR VOMITING 18 tablet 10     sucralfate (CARAFATE) 1 GM tablet Take 1 tablet (1 g) by mouth 4 times daily 120 tablet 1     QUEtiapine (SEROQUEL) 50 MG tablet Take 1-2 tablets ( mg) by mouth nightly as needed 60 tablet 3     loratadine (CLARITIN) 10 MG tablet Take 10 mg by mouth daily       Past Surgical History:   Procedure Laterality Date     ESOPHAGOSCOPY, GASTROSCOPY, DUODENOSCOPY (EGD), COMBINED N/A 4/17/2017    Procedure: COMBINED ESOPHAGOSCOPY, GASTROSCOPY, DUODENOSCOPY (EGD);  Surgeon: Ibrahima Esposito MD;  Location: [facility] ENDOSCOPY, SIMPLE EXAM  01/07/08     Social History     Social History     Marital status: Single     Spouse name: N/A     Number of children: N/A     Years of education: N/A     Social History Main Topics     Smoking status: Current Every Day Smoker     Types: Cigarettes     Smokeless tobacco: Never Used      Comment: 5-6 cigs daily     Alcohol use Yes      Comment: occasional drinks     Drug use: No     Sexual activity: No     Other Topics Concern     Parent/Sibling W/ Cabg, Mi Or Angioplasty Before 65f 55m? No     Social History Narrative     Family History   Problem Relation Age of Onset     Depression Mother      Respiratory Mother      Chronic Obstructive Pulmonary Disease Mother      CEREBROVASCULAR DISEASE Father      Brain anyeurism     Adrenal Disorder Other      Chronic Obstructive Pulmonary Disease Other        ROS: A 12 point review of systems was done. Except for what is listed above in the HPI, the systems review is negative .      Objective: Vital signs: Blood pressure 118/84, pulse 94, temperature 97.6  F (36.4  C), temperature source Tympanic, resp. rate 16, height 5' 5\" (1.651 m), weight 154 lb (69.9 kg), SpO2 100 %, not currently breastfeeding.    No other exam is done " today.         Assessment/Plan: A total of 30 minutes were spent face-to-face with this patient during today's consultation, with more than 50% of that time devoted to conversation and counseling about the management decisions.      1.  She is 41  with LSIL pap and HR HPV- no history of cervical dysplasia- I have advised colposcopy. She told me she has severe anxiety with these exams and is wondering if it can be done under IV sedation/MAC- I agreed to do it that way. She signed a consent form today.     2. She has recent onset of bilateral pelvic pain. Would like an exam while she is under anesthesia- I advised that we include an US and also that I do an endometrial biopsy because if the the ECC is abnormal or the US abnormal  We would need to do that later so we will include it while she is asleep. She is ok with that.    3.  She will have a preop exam with Dr Chino    4. I strongly advised her to stop smoking but she cant right now- too much stress    RJ Chandler MD

## 2017-08-07 NOTE — PROGRESS NOTES
[facility]  [address] [facility] [address]  [phone]  Dept: [phone]    PRE-OP EVALUATION:  Today's date: 2017    Radha Beckwith (: 1975) presents for pre-operative evaluation assessment as requested by Dr. Chandler.  She requires evaluation and anesthesia risk assessment prior to undergoing surgery/procedure for treatment of cervical dysplasia.  Proposed procedure: colposcopy, check uterus and ultrasound under sedation    Date of Surgery/ Procedure: 8/10/17  Time of Surgery/ Procedure: 1 pm  Hospital/Surgical Facility: [facility]  Fax number for surgical facility:   Primary Physician: Kassandra Bergmanman  Type of Anesthesia Anticipated: to be determined    Patient has a Health Care Directive or Living Will:  NO    Preop Questions 2017   1.  Do you have a history of heart attack, stroke, stent, bypass or surgery on an artery in the head, neck, heart or legs? No   2.  Do you ever have any pain or discomfort in your chest? No   3.  Do you have a history of  Heart Failure? No   4.   Are you troubled by shortness of breath when:  walking on a level surface, or up a slight hill, or at night? No   5.  Do you currently have a cold, bronchitis or other respiratory infection? No   6.  Do you have a cough, shortness of breath, or wheezing? No   7.  Do you sometimes get pains in the calves of your legs when you walk? No   8. Do you or anyone in your family have previous history of blood clots? No   9.  Do you or does anyone in your family have a serious bleeding problem such as prolonged bleeding following surgeries or cuts? No   10. Have you ever had problems with anemia or been told to take iron pills? No   11. Have you had any abnormal blood loss such as black, tarry or bloody stools, or abnormal vaginal bleeding? No   12. Have you ever had a blood transfusion? No   13. Have you or any of your relatives ever had problems with anesthesia? No   14. Do you  have sleep apnea, excessive snoring or daytime drowsiness? No   15. Do you have any prosthetic heart valves? No   16. Do you have prosthetic joints? No   17. Is there any chance that you may be pregnant? No           HPI:                                                      Brief HPI related to upcoming procedure:     Pt had abnormal pap on recent screening.  Needs colposcopy, but is deathly afraid of GYN exams.  MD agreed to do procedure under sedation in OR.  Her for perioperative evaluation      See problem list for active medical problems.  Problems all longstanding and stable, except as noted/documented.  See ROS for pertinent symptoms related to these conditions.                                                                                                  .  DEPRESSION - Patient has a long history of Depression of moderate severity requiring medication for control with recent symptoms being stable..Current symptoms of depression include none.                                                                                                                                                                                    .                                                                                                                                   .    MEDICAL HISTORY:                                                    Patient Active Problem List    Diagnosis Date Noted     Papanicolaou smear of cervix with low grade squamous intraepithelial lesion (LGSIL) 07/07/2017     Priority: Medium     7/7/17 LSIL pap/+ HR HPV (not 16 or 18). Plan: colp bef 10/7/17.        Opioid dependence in remission (H) 08/02/2015     Priority: Medium     On suboxone treatement with Garland Rodriguez MD, [place].  (Noted in 2015).  Has been off suboxone since at least June of 2017. 8/9/2017        Gastric ulcer 07/31/2015     Priority: Medium     Anxiety attack 07/31/2015     Priority: Medium     Hypokalemia 06/23/2015     Priority:  Medium     Atypical chest pain 06/23/2015     Priority: Medium     Tobacco abuse 06/23/2015     Priority: Medium     CARDIOVASCULAR SCREENING; LDL GOAL LESS THAN 160 01/29/2014     Priority: Medium     Ingrowing nail 01/09/2014     Priority: Medium     Anxiety 08/19/2013     Priority: Medium     GERD (gastroesophageal reflux disease) 07/28/2010     Priority: Medium     Takes omeprazole and metoclopramide       Restless legs 07/28/2010     Priority: Medium      Past Medical History:   Diagnosis Date     Abdominal pain, right lower quadrant 03/09/08    Admit. Discharged 03/10/08     Anxiety attack 7/31/2015     Dehydration      Gastric ulcer 7/31/2015     GERD (gastroesophageal reflux disease) 7/28/2010    Takes omeprazole and metoclopramide      Opioid dependence in remission (H) 8/2/2015     Other and unspecified ovarian cyst      Papanicolaou smear of cervix with low grade squamous intraepithelial lesion (LGSIL) 07/07/2017     Past Surgical History:   Procedure Laterality Date     ESOPHAGOSCOPY, GASTROSCOPY, DUODENOSCOPY (EGD), COMBINED N/A 4/17/2017    Procedure: COMBINED ESOPHAGOSCOPY, GASTROSCOPY, DUODENOSCOPY (EGD);  Surgeon: Ibrahima Esposito MD;  Location: [facility] UGI ENDOSCOPY, SIMPLE EXAM  01/07/08     Current Outpatient Prescriptions   Medication Sig Dispense Refill     LORazepam (ATIVAN) 1 MG tablet Take 0.5 tablets (0.5 mg) by mouth every 8 hours as needed for anxiety 20 tablet 0     cetirizine (ZYRTEC) 10 MG tablet Take 1 tablet (10 mg) by mouth every evening 30 tablet 3     medroxyPROGESTERone (DEPO-PROVERA) 150 MG/ML injection Inject 1 mL (150 mg) into the muscle every 3 months 3 mL 3     sertraline (ZOLOFT) 100 MG tablet Take 1 tablet (100 mg) by mouth daily 90 tablet 1     order for DME Blood pressure cuff 1 each 0     omeprazole 20 MG tablet Take 1 tablet (20 mg) by mouth 2 times daily Take 30-60 minutes before a meal. 60 tablet 3     ondansetron (ZOFRAN) 4 MG tablet TAKE ONE TABLET BY MOUTH  "EVERY 6 HOURS AS NEEDED FOR NAUSEA OR VOMITING 18 tablet 10     sucralfate (CARAFATE) 1 GM tablet Take 1 tablet (1 g) by mouth 4 times daily 120 tablet 1     QUEtiapine (SEROQUEL) 50 MG tablet Take 1-2 tablets ( mg) by mouth nightly as needed 60 tablet 3     OTC products: tylenol, potassium    Allergies   Allergen Reactions     Cafergot      12-            GI problems-     Compazine      Droperidol      Nubain [Nalbuphine Hcl]      Seasonal Allergies      Sumatriptan      vomits after giving herself a shot     Prochlorperazine Palpitations     Uncontrolled movement      Latex Allergy: NO    Social History   Substance Use Topics     Smoking status: Current Every Day Smoker     Packs/day: 0.25     Types: Cigarettes     Smokeless tobacco: Never Used      Comment: 5-6 cigs daily     Alcohol use Yes      Comment: occasional drinks     History   Drug Use No       REVIEW OF SYSTEMS:                                                    C: NEGATIVE for fever, chills, change in weight  I: NEGATIVE for worrisome rashes, moles or lesions  E: NEGATIVE for vision changes or irritation  E/M: NEGATIVE for ear, mouth and throat problems  R: NEGATIVE for significant cough or SOB  B: NEGATIVE for masses, tenderness or discharge  CV: NEGATIVE for chest pain, palpitations or peripheral edema  GI: NEGATIVE for nausea, abdominal pain, heartburn, or change in bowel habits  : NEGATIVE for frequency, dysuria, or hematuria  M: NEGATIVE for significant arthralgias or myalgia  N: NEGATIVE for weakness, dizziness or paresthesias  H: NEGATIVE for bleeding problems  P: NEGATIVE for changes in mood or affect    EXAM:                                                    /78 (BP Location: Left arm, Patient Position: Chair, Cuff Size: Adult Regular)  Pulse 108  Temp 98.5  F (36.9  C) (Temporal)  Resp 16  Ht 5' 5\" (1.651 m)  Wt 151 lb 12.8 oz (68.9 kg)  BMI 25.26 kg/m2    GENERAL APPEARANCE: healthy, alert and no distress     " EYES: EOMI, PERRL     HENT: ear canals and TM's normal and nose and mouth without ulcers or lesions     NECK: no adenopathy, no asymmetry, masses, or scars and thyroid normal to palpation     RESP: lungs clear to auscultation - no rales, rhonchi or wheezes     CV: regular rates and rhythm, normal S1 S2, no S3 or S4 and no murmur, click or rub     ABDOMEN:  soft, nontender, no HSM or masses and bowel sounds normal     MS: extremities normal- no gross deformities noted, no evidence of inflammation in joints, FROM in all extremities.     SKIN: no suspicious lesions or rashes     NEURO: Normal strength and tone, sensory exam grossly normal, mentation intact and speech normal     PSYCH: mentation appears normal. and affect normal/bright    DIAGNOSTICS:                                                    EKG: Not indicated due to non-vascular surgery and low risk of event (age <65 and without cardiac risk factors)    Recent Labs   Lab Test  03/24/17   0803  10/02/15   0823   HGB  14.5  14.4   PLT  226  207   INR  0.87   --    NA  144  135   POTASSIUM  3.8  3.9   CR  0.68  0.60        IMPRESSION:                                                    Reason for surgery/procedure: cervical dysplasia on pap  Diagnosis/reason for consult: perioperative risk evaluation for sedation    The proposed surgical procedure is considered LOW risk.    REVISED CARDIAC RISK INDEX  The patient has the following serious cardiovascular risks for perioperative complications such as (MI, PE, VFib and 3  AV Block):  No serious cardiac risks  INTERPRETATION: 0 risks: Class I (very low risk - 0.4% complication rate)    The patient has the following additional risks for perioperative complications:  High tolerance to opioid analgesics due to pas history      ICD-10-CM    1. Preop general physical exam Z01.818 Lipid Profile (Chol, Trig, HDL, LDL calc)   2. Papanicolaou smear of cervix with low grade squamous intraepithelial lesion (LGSIL) R87.612    3.  Opioid dependence in remission (H) F11.21    4. Gastroesophageal reflux disease, esophagitis presence not specified K21.9    5. Anxiety F41.9    6. Restless legs G25.81    7. Tobacco abuse Z72.0    8. Hypokalemia E87.6    9. CARDIOVASCULAR SCREENING; LDL GOAL LESS THAN 100 Z13.6 Lipid Profile (Chol, Trig, HDL, LDL calc)       RECOMMENDATIONS:                                                          --Patient is to take all scheduled medications on the day of surgery EXCEPT for modifications listed below.    APPROVAL GIVEN to proceed with proposed procedure, without further diagnostic evaluation       Signed Electronically by: Clemente Chino MD, MD    Copy of this evaluation report is provided to requesting physician.    [facility] Preop Guidelines

## 2017-08-07 NOTE — PATIENT INSTRUCTIONS
Before Your Surgery      Call your surgeon if there is any change in your health. This includes signs of a cold or flu (such as a sore throat, runny nose, cough, rash or fever).    Do not smoke, drink alcohol or take over the counter medicine (unless your surgeon or primary care doctor tells you to) for the 24 hours before and after surgery.    If you take prescribed drugs: Follow your doctor s orders about which medicines to take and which to stop until after surgery.    Eating and drinking prior to surgery: follow the instructions from your surgeon    Take a shower or bath the night before surgery. Use the soap your surgeon gave you to gently clean your skin. If you do not have soap from your surgeon, use your regular soap. Do not shave or scrub the surgery site.  Wear clean pajamas and have clean sheets on your bed.     Take your omeprazole tomorrow with a sip of water.      Can hold other medications tomorrow morning until after surgery.    Contact us or return if questions or concerns.    Have a nice day!        Dr. Chino

## 2017-08-09 ENCOUNTER — OFFICE VISIT (OUTPATIENT)
Dept: FAMILY MEDICINE | Facility: OTHER | Age: 42
End: 2017-08-09
Payer: COMMERCIAL

## 2017-08-09 VITALS
HEIGHT: 65 IN | RESPIRATION RATE: 16 BRPM | DIASTOLIC BLOOD PRESSURE: 78 MMHG | BODY MASS INDEX: 25.29 KG/M2 | SYSTOLIC BLOOD PRESSURE: 126 MMHG | HEART RATE: 108 BPM | WEIGHT: 151.8 LBS | TEMPERATURE: 98.5 F

## 2017-08-09 DIAGNOSIS — Z72.0 TOBACCO ABUSE: ICD-10-CM

## 2017-08-09 DIAGNOSIS — F41.9 ANXIETY: ICD-10-CM

## 2017-08-09 DIAGNOSIS — G25.81 RESTLESS LEGS: ICD-10-CM

## 2017-08-09 DIAGNOSIS — J30.2 SEASONAL ALLERGIC RHINITIS, UNSPECIFIED CHRONICITY, UNSPECIFIED TRIGGER: ICD-10-CM

## 2017-08-09 DIAGNOSIS — Z01.818 PREOP GENERAL PHYSICAL EXAM: Primary | ICD-10-CM

## 2017-08-09 DIAGNOSIS — F11.21 OPIOID DEPENDENCE IN REMISSION (H): ICD-10-CM

## 2017-08-09 DIAGNOSIS — Z13.6 CARDIOVASCULAR SCREENING; LDL GOAL LESS THAN 100: ICD-10-CM

## 2017-08-09 DIAGNOSIS — K21.9 GASTROESOPHAGEAL REFLUX DISEASE, ESOPHAGITIS PRESENCE NOT SPECIFIED: ICD-10-CM

## 2017-08-09 DIAGNOSIS — E87.6 HYPOKALEMIA: ICD-10-CM

## 2017-08-09 DIAGNOSIS — R87.612 PAPANICOLAOU SMEAR OF CERVIX WITH LOW GRADE SQUAMOUS INTRAEPITHELIAL LESION (LGSIL): ICD-10-CM

## 2017-08-09 PROCEDURE — 99214 OFFICE O/P EST MOD 30 MIN: CPT | Performed by: FAMILY MEDICINE

## 2017-08-09 RX ORDER — CETIRIZINE HYDROCHLORIDE 10 MG/1
10 TABLET ORAL EVERY EVENING
Qty: 30 TABLET | Refills: 3 | Status: SHIPPED | OUTPATIENT
Start: 2017-08-09 | End: 2018-01-15

## 2017-08-09 ASSESSMENT — ANXIETY QUESTIONNAIRES
2. NOT BEING ABLE TO STOP OR CONTROL WORRYING: MORE THAN HALF THE DAYS
GAD7 TOTAL SCORE: 11
7. FEELING AFRAID AS IF SOMETHING AWFUL MIGHT HAPPEN: MORE THAN HALF THE DAYS
5. BEING SO RESTLESS THAT IT IS HARD TO SIT STILL: NOT AT ALL
7. FEELING AFRAID AS IF SOMETHING AWFUL MIGHT HAPPEN: MORE THAN HALF THE DAYS
1. FEELING NERVOUS, ANXIOUS, OR ON EDGE: NEARLY EVERY DAY
6. BECOMING EASILY ANNOYED OR IRRITABLE: SEVERAL DAYS
4. TROUBLE RELAXING: SEVERAL DAYS
3. WORRYING TOO MUCH ABOUT DIFFERENT THINGS: MORE THAN HALF THE DAYS
GAD7 TOTAL SCORE: 11
GAD7 TOTAL SCORE: 11

## 2017-08-09 ASSESSMENT — PATIENT HEALTH QUESTIONNAIRE - PHQ9
10. IF YOU CHECKED OFF ANY PROBLEMS, HOW DIFFICULT HAVE THESE PROBLEMS MADE IT FOR YOU TO DO YOUR WORK, TAKE CARE OF THINGS AT HOME, OR GET ALONG WITH OTHER PEOPLE: VERY DIFFICULT
SUM OF ALL RESPONSES TO PHQ QUESTIONS 1-9: 8
SUM OF ALL RESPONSES TO PHQ QUESTIONS 1-9: 8

## 2017-08-09 ASSESSMENT — PAIN SCALES - GENERAL: PAINLEVEL: NO PAIN (0)

## 2017-08-09 NOTE — MR AVS SNAPSHOT
After Visit Summary   8/9/2017    Radha Beckwith    MRN: [id number]           Patient Information     Date Of Birth          1975        Visit Information        Provider Department      8/9/2017 1:15 PM Clemente Chino MD [facility]'s Diagnoses     Preop general physical exam    -  1    Papanicolaou smear of cervix with low grade squamous intraepithelial lesion (LGSIL)        Opioid dependence in remission (H)        Gastroesophageal reflux disease, esophagitis presence not specified        Anxiety        Restless legs        Tobacco abuse        Hypokalemia        CARDIOVASCULAR SCREENING; LDL GOAL LESS THAN 100        Seasonal allergic rhinitis, unspecified chronicity, unspecified trigger          Care Instructions      Before Your Surgery      Call your surgeon if there is any change in your health. This includes signs of a cold or flu (such as a sore throat, runny nose, cough, rash or fever).    Do not smoke, drink alcohol or take over the counter medicine (unless your surgeon or primary care doctor tells you to) for the 24 hours before and after surgery.    If you take prescribed drugs: Follow your doctor s orders about which medicines to take and which to stop until after surgery.    Eating and drinking prior to surgery: follow the instructions from your surgeon    Take a shower or bath the night before surgery. Use the soap your surgeon gave you to gently clean your skin. If you do not have soap from your surgeon, use your regular soap. Do not shave or scrub the surgery site.  Wear clean pajamas and have clean sheets on your bed.     Take your omeprazole tomorrow with a sip of water.      Can hold other medications tomorrow morning until after surgery.    Contact us or return if questions or concerns.    Have a nice day!        Dr. Chino           Follow-ups after your visit        Your next 10 appointments already scheduled     Aug 10, 2017    Procedure with Michael Chandler MD   [facility]iop [facility] ([facility])    [address] [place] [address]  [place] [id number] [phone]           From [address]: Exit at [employer] on south side of [place]. Turn right on [address]. Turn left at stoplight on [address]. [facility] will be in view two blocks ahead            Aug 10, 2017  1:00 PM CDT   (Arrive by 12:00 PM)   US PELVIC COMPLETE WITH TRANSVAGINAL PORTABLE with PHUS1   [facility] Ultrasound ([facility])    [address]  [place] [address]   [phone]           Please bring a list of your medicines (including vitamins, minerals and over-the-counter drugs). Also, tell your doctor about any allergies you may have. Wear comfortable clothes and leave your valuables at home.  Adults: Drink six 8-ounce glasses of fluid one hour before your exam. Do NOT empty your bladder.  If you need to empty your bladder before your exam, try to release only a little bit of urine. Then, drink another 8oz glass of fluid.  Children: Children who are potty trained should drink at least 4 cups (32 oz) of liquid 45 minutes to one hour prior to the exam. The child s bladder must be full in order to achieve a diagnostic exam. If your child is very uncomfortable or has an urgent need to pee, please notify a technologist; they will try to find out how much longer the wait may be and provide instructions to help relieve the pressure. Occasionally it is medically necessary to insert a urinary catheter to fill the bladder.  Please call the Imaging Department at your exam site with any questions.            Aug 10, 2017  1:45 PM CDT   Colposcopy with NL PROC ROOM SPECIALTY [facility]C   [facility]    [address]  [place] [address]   [phone]            Aug 16, 2017 10:30 AM CDT   SHORT with Michael Chandler MD   [place]  "[facility] ([facility])    [address] [place] [id number] [phone]              Future tests that were ordered for you today     Open Future Orders        Priority Expected Expires Ordered    Lipid Profile (Chol, Trig, HDL, LDL calc) Routine  2018            Who to contact     If you have questions or need follow up information about today's clinic visit or your schedule please contact [facility] directly at [phone].  Normal or non-critical lab and imaging results will be communicated to you by MyChart, letter or phone within 4 business days after the clinic has received the results. If you do not hear from us within 7 days, please contact the clinic through [facility] or phone. If you have a critical or abnormal lab result, we will notify you by phone as soon as possible.  Submit refill requests through Disconnect or call your pharmacy and they will forward the refill request to us. Please allow 3 business days for your refill to be completed.          Additional Information About Your Visit        [facility] Information     Disconnect lets you send messages to your doctor, view your test results, renew your prescriptions, schedule appointments and more. To sign up, go to [email or web address] . Click on \"Log in\" on the left side of the screen, which will take you to the Welcome page. Then click on \"Sign up Now\" on the right side of the page.     You will be asked to enter the access code listed below, as well as some personal information. Please follow the directions to create your username and password.     Your access code is: [email or web address]  Expires: 2017  2:47 PM     Your access code will  in 90 days. If you need help or a new code, please call your [facility] or [phone].        Care EveryWhere ID     This is your Care EveryWhere ID. This could be used by other organizations to access your [facility] medical " "records  [id number]        Your Vitals Were     Pulse Temperature Respirations Height BMI (Body Mass Index)       108 98.5  F (36.9  C) (Temporal) 16 5' 5\" (1.651 m) 25.26 kg/m2        Blood Pressure from Last 3 Encounters:   08/09/17 126/78   08/03/17 118/84   07/07/17 120/78    Weight from Last 3 Encounters:   08/09/17 151 lb 12.8 oz (68.9 kg)   08/03/17 154 lb (69.9 kg)   07/07/17 151 lb (68.5 kg)                 Where to get your medicines      These medications were sent to [facility] [place] - [address]   [address] [place] [address]     Phone:  [phone]     cetirizine 10 MG tablet          Primary Care Provider Office Phone #    [facility] [phone]       No address on file        Equal Access to Services     DIONNE GALLARDO : Hadii quintin alexander hadasho Soomaali, waaxda luqadaha, qaybta kaalmada adeegyada, yadi amezcua . So [facility] [phone].    ATENCIÓN: Si habla español, tiene a rueda disposición servicios gratuitos de asistencia lingüística. Llame al [phone].    We comply with applicable federal civil rights laws and [place] laws. We do not discriminate on the basis of race, color, national origin, age, disability sex, sexual orientation or gender identity.            Thank you!     Thank you for choosing [facility]  for your care. Our goal is always to provide you with excellent care. Hearing back from our patients is one way we can continue to improve our services. Please take a few minutes to complete the written survey that you may receive in the mail after your visit with us. Thank you!             Your Updated Medication List - Protect others around you: Learn how to safely use, store and throw away your medicines at www.disposemymeds.org.          This list is accurate as of: 8/9/17  1:25 PM.  Always use your most recent med list.                   Brand Name Dispense Instructions for use " Diagnosis    cetirizine 10 MG tablet    zyrTEC    30 tablet    Take 1 tablet (10 mg) by mouth every evening    Seasonal allergic rhinitis, unspecified chronicity, unspecified trigger       LORazepam 1 MG tablet    ATIVAN    20 tablet    Take 0.5 tablets (0.5 mg) by mouth every 8 hours as needed for anxiety    Anxiety       medroxyPROGESTERone 150 MG/ML injection    DEPO-PROVERA    3 mL    Inject 1 mL (150 mg) into the muscle every 3 months        omeprazole 20 MG tablet     60 tablet    Take 1 tablet (20 mg) by mouth 2 times daily Take 30-60 minutes before a meal.    Gastroesophageal reflux disease, esophagitis presence not specified       ondansetron 4 MG tablet    ZOFRAN    18 tablet    TAKE ONE TABLET BY MOUTH EVERY 6 HOURS AS NEEDED FOR NAUSEA OR VOMITING    Anxiety attack       order for DME     1 each    Blood pressure cuff    Elevated blood pressure reading without diagnosis of hypertension       QUEtiapine 50 MG tablet    SEROQUEL    60 tablet    Take 1-2 tablets ( mg) by mouth nightly as needed    Anxiety, Psychophysiological insomnia       sertraline 100 MG tablet    ZOLOFT    90 tablet    Take 1 tablet (100 mg) by mouth daily    Anxiety       sucralfate 1 GM tablet    CARAFATE    120 tablet    Take 1 tablet (1 g) by mouth 4 times daily    Gastroesophageal reflux disease, esophagitis presence not specified

## 2017-08-09 NOTE — NURSING NOTE
"Chief Complaint   Patient presents with     Pre-Op Exam     Panel Management     Ortho labs due, lipid, BO, HCG       Initial /78 (BP Location: Left arm, Patient Position: Chair, Cuff Size: Adult Regular)  Pulse 108  Temp 98.5  F (36.9  C) (Temporal)  Resp 16  Ht 5' 5\" (1.651 m)  Wt 151 lb 12.8 oz (68.9 kg)  BMI 25.26 kg/m2 Estimated body mass index is 25.26 kg/(m^2) as calculated from the following:    Height as of this encounter: 5' 5\" (1.651 m).    Weight as of this encounter: 151 lb 12.8 oz (68.9 kg).  Medication Reconciliation: complete  Zenon Malik, LYSSA    "

## 2017-08-10 ENCOUNTER — HOSPITAL ENCOUNTER (OUTPATIENT)
Facility: CLINIC | Age: 42
Discharge: HOME | End: 2017-08-10
Attending: OBSTETRICS & GYNECOLOGY | Admitting: OBSTETRICS & GYNECOLOGY
Payer: COMMERCIAL

## 2017-08-10 ENCOUNTER — HOSPITAL ENCOUNTER (OUTPATIENT)
Dept: ULTRASOUND IMAGING | Facility: CLINIC | Age: 42
End: 2017-08-10
Attending: OBSTETRICS & GYNECOLOGY | Admitting: OBSTETRICS & GYNECOLOGY
Payer: COMMERCIAL

## 2017-08-10 ENCOUNTER — SURGERY (OUTPATIENT)
Age: 42
End: 2017-08-10
Payer: COMMERCIAL

## 2017-08-10 ENCOUNTER — TELEPHONE (OUTPATIENT)
Dept: FAMILY MEDICINE | Facility: OTHER | Age: 42
End: 2017-08-10

## 2017-08-10 ENCOUNTER — ANESTHESIA (OUTPATIENT)
Dept: SURGERY | Facility: CLINIC | Age: 42
End: 2017-08-10
Payer: COMMERCIAL

## 2017-08-10 ENCOUNTER — ANESTHESIA EVENT (OUTPATIENT)
Dept: SURGERY | Facility: CLINIC | Age: 42
End: 2017-08-10
Payer: COMMERCIAL

## 2017-08-10 VITALS
DIASTOLIC BLOOD PRESSURE: 95 MMHG | TEMPERATURE: 98.6 F | SYSTOLIC BLOOD PRESSURE: 124 MMHG | RESPIRATION RATE: 16 BRPM | HEART RATE: 96 BPM | OXYGEN SATURATION: 98 %

## 2017-08-10 DIAGNOSIS — F41.9 ANXIETY: ICD-10-CM

## 2017-08-10 DIAGNOSIS — F51.04 PSYCHOPHYSIOLOGICAL INSOMNIA: ICD-10-CM

## 2017-08-10 DIAGNOSIS — Z13.6 CARDIOVASCULAR SCREENING; LDL GOAL LESS THAN 160: ICD-10-CM

## 2017-08-10 DIAGNOSIS — Z01.812 PRE-PROCEDURE LAB EXAM: ICD-10-CM

## 2017-08-10 DIAGNOSIS — R10.2 PELVIC PAIN IN FEMALE: ICD-10-CM

## 2017-08-10 DIAGNOSIS — R00.0 TACHYCARDIA: ICD-10-CM

## 2017-08-10 DIAGNOSIS — R73.9 HYPERGLYCEMIA: ICD-10-CM

## 2017-08-10 DIAGNOSIS — R03.0 ELEVATED BLOOD PRESSURE READING WITHOUT DIAGNOSIS OF HYPERTENSION: ICD-10-CM

## 2017-08-10 LAB
ALBUMIN SERPL-MCNC: 4 G/DL (ref 3.4–5)
ALP SERPL-CCNC: 63 U/L (ref 40–150)
ALT SERPL W P-5'-P-CCNC: 35 U/L (ref 0–50)
ANION GAP SERPL CALCULATED.3IONS-SCNC: 9 MMOL/L (ref 3–14)
AST SERPL W P-5'-P-CCNC: 22 U/L (ref 0–45)
B-HCG SERPL-ACNC: <1 IU/L (ref 0–5)
BILIRUB SERPL-MCNC: 0.6 MG/DL (ref 0.2–1.3)
BUN SERPL-MCNC: 8 MG/DL (ref 7–30)
CALCIUM SERPL-MCNC: 9.2 MG/DL (ref 8.5–10.1)
CHLORIDE SERPL-SCNC: 109 MMOL/L (ref 94–109)
CHOLEST SERPL-MCNC: 287 MG/DL
CO2 SERPL-SCNC: 24 MMOL/L (ref 20–32)
CREAT SERPL-MCNC: 0.67 MG/DL (ref 0.52–1.04)
GFR SERPL CREATININE-BSD FRML MDRD: NORMAL ML/MIN/1.7M2
GLUCOSE SERPL-MCNC: 99 MG/DL (ref 70–99)
HDLC SERPL-MCNC: 30 MG/DL
LDLC SERPL CALC-MCNC: ABNORMAL MG/DL
LDLC SERPL DIRECT ASSAY-MCNC: 164 MG/DL
NONHDLC SERPL-MCNC: 257 MG/DL
POTASSIUM SERPL-SCNC: 4.1 MMOL/L (ref 3.4–5.3)
PROT SERPL-MCNC: 7.7 G/DL (ref 6.8–8.8)
SODIUM SERPL-SCNC: 142 MMOL/L (ref 133–144)
TRIGL SERPL-MCNC: 608 MG/DL
TSH SERPL DL<=0.005 MIU/L-ACNC: 1.01 MU/L (ref 0.4–4)

## 2017-08-10 PROCEDURE — 25000125 ZZHC RX 250: Performed by: NURSE ANESTHETIST, CERTIFIED REGISTERED

## 2017-08-10 PROCEDURE — 80061 LIPID PANEL: CPT | Performed by: FAMILY MEDICINE

## 2017-08-10 PROCEDURE — 88305 TISSUE EXAM BY PATHOLOGIST: CPT | Mod: 26 | Performed by: OBSTETRICS & GYNECOLOGY

## 2017-08-10 PROCEDURE — 71000027 ZZH RECOVERY PHASE 2 EACH 15 MINS: Performed by: OBSTETRICS & GYNECOLOGY

## 2017-08-10 PROCEDURE — 25000128 H RX IP 250 OP 636: Performed by: NURSE ANESTHETIST, CERTIFIED REGISTERED

## 2017-08-10 PROCEDURE — 83721 ASSAY OF BLOOD LIPOPROTEIN: CPT | Performed by: FAMILY MEDICINE

## 2017-08-10 PROCEDURE — 84702 CHORIONIC GONADOTROPIN TEST: CPT | Performed by: FAMILY MEDICINE

## 2017-08-10 PROCEDURE — 88305 TISSUE EXAM BY PATHOLOGIST: CPT | Performed by: OBSTETRICS & GYNECOLOGY

## 2017-08-10 PROCEDURE — 27210794 ZZH OR GENERAL SUPPLY STERILE: Performed by: OBSTETRICS & GYNECOLOGY

## 2017-08-10 PROCEDURE — 80053 COMPREHEN METABOLIC PANEL: CPT | Performed by: FAMILY MEDICINE

## 2017-08-10 PROCEDURE — 40000306 ZZH STATISTIC PRE PROC ASSESS II: Performed by: OBSTETRICS & GYNECOLOGY

## 2017-08-10 PROCEDURE — 36000050 ZZH SURGERY LEVEL 2 1ST 30 MIN: Performed by: OBSTETRICS & GYNECOLOGY

## 2017-08-10 PROCEDURE — 36000052 ZZH SURGERY LEVEL 2 EA 15 ADDTL MIN: Performed by: OBSTETRICS & GYNECOLOGY

## 2017-08-10 PROCEDURE — 37000009 ZZH ANESTHESIA TECHNICAL FEE, EACH ADDTL 15 MIN: Performed by: OBSTETRICS & GYNECOLOGY

## 2017-08-10 PROCEDURE — 76830 TRANSVAGINAL US NON-OB: CPT

## 2017-08-10 PROCEDURE — 36415 COLL VENOUS BLD VENIPUNCTURE: CPT | Performed by: FAMILY MEDICINE

## 2017-08-10 PROCEDURE — 84443 ASSAY THYROID STIM HORMONE: CPT | Performed by: FAMILY MEDICINE

## 2017-08-10 PROCEDURE — 57454 BX/CURETT OF CERVIX W/SCOPE: CPT | Performed by: OBSTETRICS & GYNECOLOGY

## 2017-08-10 PROCEDURE — 37000008 ZZH ANESTHESIA TECHNICAL FEE, 1ST 30 MIN: Performed by: OBSTETRICS & GYNECOLOGY

## 2017-08-10 PROCEDURE — 80048 BASIC METABOLIC PNL TOTAL CA: CPT | Performed by: FAMILY MEDICINE

## 2017-08-10 RX ORDER — ACETAMINOPHEN 10 MG/ML
1000 INJECTION, SOLUTION INTRAVENOUS ONCE
Status: COMPLETED | OUTPATIENT
Start: 2017-08-10 | End: 2017-08-10

## 2017-08-10 RX ORDER — LIDOCAINE HYDROCHLORIDE 20 MG/ML
INJECTION, SOLUTION INFILTRATION; PERINEURAL PRN
Status: DISCONTINUED | OUTPATIENT
Start: 2017-08-10 | End: 2017-08-10

## 2017-08-10 RX ORDER — MEPERIDINE HYDROCHLORIDE 25 MG/ML
12.5 INJECTION INTRAMUSCULAR; INTRAVENOUS; SUBCUTANEOUS
Status: DISCONTINUED | OUTPATIENT
Start: 2017-08-10 | End: 2017-08-10 | Stop reason: HOSPADM

## 2017-08-10 RX ORDER — PROPOFOL 10 MG/ML
INJECTION, EMULSION INTRAVENOUS PRN
Status: DISCONTINUED | OUTPATIENT
Start: 2017-08-10 | End: 2017-08-10

## 2017-08-10 RX ORDER — FENTANYL CITRATE 50 UG/ML
25-50 INJECTION, SOLUTION INTRAMUSCULAR; INTRAVENOUS
Status: DISCONTINUED | OUTPATIENT
Start: 2017-08-10 | End: 2017-08-10 | Stop reason: HOSPADM

## 2017-08-10 RX ORDER — HYDROMORPHONE HYDROCHLORIDE 1 MG/ML
0.5 INJECTION, SOLUTION INTRAMUSCULAR; INTRAVENOUS; SUBCUTANEOUS EVERY 10 MIN PRN
Status: DISCONTINUED | OUTPATIENT
Start: 2017-08-10 | End: 2017-08-10 | Stop reason: HOSPADM

## 2017-08-10 RX ORDER — SODIUM CHLORIDE, SODIUM LACTATE, POTASSIUM CHLORIDE, CALCIUM CHLORIDE 600; 310; 30; 20 MG/100ML; MG/100ML; MG/100ML; MG/100ML
INJECTION, SOLUTION INTRAVENOUS CONTINUOUS
Status: DISCONTINUED | OUTPATIENT
Start: 2017-08-10 | End: 2017-08-10 | Stop reason: HOSPADM

## 2017-08-10 RX ORDER — NALOXONE HYDROCHLORIDE 0.4 MG/ML
.1-.4 INJECTION, SOLUTION INTRAMUSCULAR; INTRAVENOUS; SUBCUTANEOUS
Status: DISCONTINUED | OUTPATIENT
Start: 2017-08-10 | End: 2017-08-10 | Stop reason: HOSPADM

## 2017-08-10 RX ORDER — PROPOFOL 10 MG/ML
INJECTION, EMULSION INTRAVENOUS CONTINUOUS PRN
Status: DISCONTINUED | OUTPATIENT
Start: 2017-08-10 | End: 2017-08-10

## 2017-08-10 RX ORDER — DEXAMETHASONE SODIUM PHOSPHATE 10 MG/ML
INJECTION, SOLUTION INTRAMUSCULAR; INTRAVENOUS PRN
Status: DISCONTINUED | OUTPATIENT
Start: 2017-08-10 | End: 2017-08-10

## 2017-08-10 RX ORDER — ONDANSETRON 2 MG/ML
INJECTION INTRAMUSCULAR; INTRAVENOUS PRN
Status: DISCONTINUED | OUTPATIENT
Start: 2017-08-10 | End: 2017-08-10

## 2017-08-10 RX ORDER — FENTANYL CITRATE 50 UG/ML
INJECTION, SOLUTION INTRAMUSCULAR; INTRAVENOUS PRN
Status: DISCONTINUED | OUTPATIENT
Start: 2017-08-10 | End: 2017-08-10

## 2017-08-10 RX ORDER — ONDANSETRON 2 MG/ML
4 INJECTION INTRAMUSCULAR; INTRAVENOUS EVERY 30 MIN PRN
Status: DISCONTINUED | OUTPATIENT
Start: 2017-08-10 | End: 2017-08-10 | Stop reason: HOSPADM

## 2017-08-10 RX ORDER — ONDANSETRON 4 MG/1
4 TABLET, ORALLY DISINTEGRATING ORAL EVERY 30 MIN PRN
Status: DISCONTINUED | OUTPATIENT
Start: 2017-08-10 | End: 2017-08-10 | Stop reason: HOSPADM

## 2017-08-10 RX ORDER — LIDOCAINE 40 MG/G
CREAM TOPICAL
Status: DISCONTINUED | OUTPATIENT
Start: 2017-08-10 | End: 2017-08-10 | Stop reason: HOSPADM

## 2017-08-10 RX ADMIN — PROPOFOL 100 MG: 10 INJECTION, EMULSION INTRAVENOUS at 13:02

## 2017-08-10 RX ADMIN — FENTANYL CITRATE 50 MCG: 50 INJECTION INTRAMUSCULAR; INTRAVENOUS at 13:45

## 2017-08-10 RX ADMIN — MIDAZOLAM HYDROCHLORIDE 1 MG: 1 INJECTION, SOLUTION INTRAMUSCULAR; INTRAVENOUS at 12:51

## 2017-08-10 RX ADMIN — PROPOFOL 70 MG: 10 INJECTION, EMULSION INTRAVENOUS at 13:12

## 2017-08-10 RX ADMIN — DEXAMETHASONE SODIUM PHOSPHATE 10 MG: 10 INJECTION, SOLUTION INTRAMUSCULAR; INTRAVENOUS at 13:07

## 2017-08-10 RX ADMIN — MIDAZOLAM HYDROCHLORIDE 1 MG: 1 INJECTION, SOLUTION INTRAMUSCULAR; INTRAVENOUS at 12:54

## 2017-08-10 RX ADMIN — LIDOCAINE HYDROCHLORIDE 40 MG: 20 INJECTION, SOLUTION INFILTRATION; PERINEURAL at 13:02

## 2017-08-10 RX ADMIN — PROPOFOL 30 MG: 10 INJECTION, EMULSION INTRAVENOUS at 13:11

## 2017-08-10 RX ADMIN — PROPOFOL 50 MG: 10 INJECTION, EMULSION INTRAVENOUS at 13:21

## 2017-08-10 RX ADMIN — FENTANYL CITRATE 50 MCG: 50 INJECTION, SOLUTION INTRAMUSCULAR; INTRAVENOUS at 12:57

## 2017-08-10 RX ADMIN — PROPOFOL 50 MG: 10 INJECTION, EMULSION INTRAVENOUS at 13:14

## 2017-08-10 RX ADMIN — SODIUM CHLORIDE, POTASSIUM CHLORIDE, SODIUM LACTATE AND CALCIUM CHLORIDE: 600; 310; 30; 20 INJECTION, SOLUTION INTRAVENOUS at 12:50

## 2017-08-10 RX ADMIN — ONDANSETRON 4 MG: 2 INJECTION INTRAMUSCULAR; INTRAVENOUS at 13:07

## 2017-08-10 RX ADMIN — ACETAMINOPHEN 1000 MG: 10 INJECTION, SOLUTION INTRAVENOUS at 14:19

## 2017-08-10 RX ADMIN — PROPOFOL 70 MG: 10 INJECTION, EMULSION INTRAVENOUS at 13:15

## 2017-08-10 RX ADMIN — LIDOCAINE HYDROCHLORIDE 1 ML: 10 INJECTION, SOLUTION EPIDURAL; INFILTRATION; INTRACAUDAL; PERINEURAL at 12:50

## 2017-08-10 RX ADMIN — FENTANYL CITRATE 50 MCG: 50 INJECTION INTRAMUSCULAR; INTRAVENOUS at 13:56

## 2017-08-10 RX ADMIN — HYDROMORPHONE HYDROCHLORIDE 0.5 MG: 1 INJECTION, SOLUTION INTRAMUSCULAR; INTRAVENOUS; SUBCUTANEOUS at 14:17

## 2017-08-10 RX ADMIN — PROPOFOL 150 MCG/KG/MIN: 10 INJECTION, EMULSION INTRAVENOUS at 13:02

## 2017-08-10 ASSESSMENT — PATIENT HEALTH QUESTIONNAIRE - PHQ9: SUM OF ALL RESPONSES TO PHQ QUESTIONS 1-9: 8

## 2017-08-10 ASSESSMENT — ANXIETY QUESTIONNAIRES: GAD7 TOTAL SCORE: 11

## 2017-08-10 NOTE — DISCHARGE INSTRUCTIONS
Discharge instructions for Dr. Chandler:         You will need to schedule a post operative appointment  Within the next week.. Please call [phone] or [phone]  to speak to Dr Chandler's nurse  or else call the main appointments line at [phone] if she is not available.         If you have unusually heavy vaginal bleeding, severe nausea with vomiting, or increased pain, or fever, call us at that same number to be seen earlier, or go to the emergency room if after hours or we are unavailable.          you should avoid intercourse for 1   week   after surgery.           you should not drive for 1 day   after surgery           you should not shower today but may resume showering tomorrow after the effects of anesthesia wear off.  Do not take a bath for 1  week   after surgery.    If you have questions do not hesitate to call the office at above number. Thank you.         Michael Chandler MD, FACOG, FAAFP              , OB/GYN  Department    You had acetaminophen (Tylenol) at 2:20 pm.    [facility]  Same-Day Surgery  Adult Discharge Orders & Instructions    For 24 hours after surgery    1. Get plenty of rest.  A responsible adult must stay with you for at least 24 hours after you leave the hospital.   2. Do not drive or use heavy equipment.  If you have weakness or tingling, don't drive or use heavy equipment until this feeling goes away.  3. Do not drink alcohol.  4. Avoid strenuous or risky activities.  Ask for help when climbing stairs.   5. You may feel lightheaded.  IF so, sit for a few minutes before standing.  Have someone help you get up.   6. If you have nausea (feel sick to your stomach): Drink only clear liquids such as apple juice, ginger ale, broth or 7-Up.  Rest may also help.  Be sure to drink enough fluids.  Move to a regular diet as you feel able.  7. You may have a slight fever. Call the doctor if your fever is over 100 F (37.7 C) (taken under the tongue)  or lasts longer than 24 hours.  8. You may have a dry mouth, a sore throat, muscle aches or trouble sleeping.  These should go away after 24 hours.  9. Do not make important or legal decisions.   Call your doctor for any of the followin.  Signs of infection (fever, growing tenderness at the surgery site, a large amount of drainage or bleeding, severe pain, foul-smelling drainage, redness, swelling).    2. It has been over 8 to 10 hours since surgery and you are still not able to urinate (pass water).    3.  Headache for over 24 hours.    To contact your surgeon, call:      [phone] Nurse Advice Line (answered 24 hours a day)      [phone] [facility]

## 2017-08-10 NOTE — ANESTHESIA PREPROCEDURE EVALUATION
Anesthesia Evaluation     . Pt has had prior anesthetic. Type: General    No history of anesthetic complications          ROS/MED HX    ENT/Pulmonary:  - neg pulmonary ROS     Neurologic:  - neg neurologic ROS     Cardiovascular:  - neg cardiovascular ROS       METS/Exercise Tolerance:     Hematologic:  - neg hematologic  ROS       Musculoskeletal:  - neg musculoskeletal ROS       GI/Hepatic:     (+) GERD Asymptomatic on medication,       Renal/Genitourinary:         Endo:  - neg endo ROS       Psychiatric:     (+) psychiatric history anxiety      Infectious Disease:  - neg infectious disease ROS       Malignancy:      - no malignancy   Other:                     Physical Exam  Normal systems: cardiovascular, pulmonary and dental    Airway   Mallampati: II  TM distance: >3 FB  Neck ROM: full    Dental     Cardiovascular   Rhythm and rate: regular and normal  (-) no murmur    Pulmonary    breath sounds clear to auscultation                    Anesthesia Plan      History & Physical Review  History and physical reviewed and following examination; no interval change.    ASA Status:  2 .    NPO Status:  > 8 hours    Plan for MAC with Propofol and Intravenous induction. Maintenance will be TIVA.  Reason for MAC:  Deep or markedly invasive procedure (G8)  PONV prophylaxis:  Ondansetron (or other 5HT-3) and Dexamethasone or Solumedrol       Postoperative Care  Postoperative pain management:  IV analgesics and Oral pain medications.      Consents  Anesthetic plan, risks, benefits and alternatives discussed with:  Patient.  Use of blood products discussed: No .   .                          .

## 2017-08-10 NOTE — ANESTHESIA CARE TRANSFER NOTE
Patient: Radha Beckwith    Procedure(s):  Colposcopy with Cervical Biopsies and Endometrial Biopsy, Exam with Ultrasound - Wound Class: II-Clean Contaminated       - Wound Class: I-Clean    Diagnosis: abnormal pap, pelvic pain  Diagnosis Additional Information: No value filed.    Anesthesia Type:   MAC     Note:  Airway :Room Air  Patient transferred to:Phase II        Vitals: (Last set prior to Anesthesia Care Transfer)    CRNA VITALS  8/10/2017 1306 - 8/10/2017 1343      8/10/2017             EKG: NSR                Electronically Signed By: JEANNIE Viramontes CRNA  August 10, 2017  1:43 PM

## 2017-08-10 NOTE — ANESTHESIA POSTPROCEDURE EVALUATION
Patient: Radha Beckwith    Procedure(s):  Colposcopy with Cervical Biopsies and Endometrial Biopsy, Exam with Ultrasound - Wound Class: II-Clean Contaminated       - Wound Class: I-Clean    Diagnosis:abnormal pap, pelvic pain  Diagnosis Additional Information: No value filed.    Anesthesia Type:  MAC    Note:  Anesthesia Post Evaluation    Patient location during evaluation: Phase 2 and Bedside  Patient participation: Able to fully participate in evaluation  Level of consciousness: awake and alert  Pain management: adequate  Airway patency: patent  Cardiovascular status: stable  Respiratory status: spontaneous ventilation and room air  Hydration status: stable  PONV: none     Anesthetic complications: None    Comments: Appear to tolerate MAC with IV sedation well without anesthesia related problems / complications noted.  Pain level adequate per patient. No N  /  V.  No complaints per patient.  Will follow as needed.        Last vitals:  Vitals:    08/10/17 1241 08/10/17 1345   BP: (!) 125/92 152/90   Pulse: 96    Resp: 16 16   Temp: 99.7  F (37.6  C)    SpO2: 98% 97%         Electronically Signed By: JEANNIE Viramontes CRNA  August 10, 2017  1:51 PM

## 2017-08-10 NOTE — IP AVS SNAPSHOT
[facility] Post [facility]esthesia [facility]    [address] [place] [address]    Phone:  [phone]                                       After Visit Summary   8/10/2017    Radha Beckwith    MRN: [id number]           After Visit Summary Signature Page     I have received my discharge instructions, and my questions have been answered. I have discussed any challenges I see with this plan with the nurse or doctor.    ..........................................................................................................................................  Patient/Patient Representative Signature      ..........................................................................................................................................  Patient Representative Print Name and Relationship to Patient    ..................................................               ................................................  Date                                            Time    ..........................................................................................................................................  Reviewed by Signature/Title    ...................................................              ..............................................  Date                                                            Time

## 2017-08-10 NOTE — OP NOTE
Surgeon: Ramesh  Preoperative diagnosis: cervical dysplasia and pelvic pain  Postoperative diagnosis: same    Procedure: colposcopy with cervical biopsies and endometrial biopsy  Anesthesia: Monitored Anesthesia Care(MAC)     Patient profile: 41 year old female    With LSIL pap and HR HPV  And pelvic pain    Operative findings: The uterus was about 6 weeks size and did  Not  descend well in the vaginal vault-she would   Not  be a candidate for vaginal hysterectomy unless laparoscopically assisted. There was moderate vaginal room.. Colposcopy was unremarkable      OPERATIVE DESCRIPTION:  After the establishment of satisfactory  monitored    anesthesia, the patient was prepped and draped in the usual fashion for   vaginal surgery and the bladder was drained of urine.  An open-sided   speculum was inserted and the cervix was grasped anteriorly with a   single-tooth tenaculum and dilated with Hanks dilators and the colposcopy performed using vinegar and the the colposcopy was adequate- the   Curettings were sent for pathology. There was no wire brush available and ECC was not performed however I did a biopsy in the ECC area( the 7 oclock bx was that)  I also took more ectocervical bx at 9 oclock and T zone bx at 2 oclock.  I also took endometrial biopsy. Then, the tenaculum was removed and the puncture sites were cauterized with silver nitrate, as well as the cervical biopsy sites..  The speculum was removed and then   after that was completed, the final sponge, needle and instrument counts   were reported to me as correct. Then the ultrasound was completed,  and the patient was taken to the recovery   room in good condition without complications.  Estimated blood loss was   10cc.       I will plan to call her in the next several days to check on her postoperative progress.She will be instructed to recheck in 1-2 weeks with me- and recheck acutely if she experiences heavy vaginal bleeding greater than menses or  fever or increased pain or vomiting.    RJ Chandler MD

## 2017-08-10 NOTE — IP AVS SNAPSHOT
MRN:[id number]                      After Visit Summary   8/10/2017    Radha Beckwith    MRN: [id number]           Thank you!     Thank you for choosing [facility] for your care. Our goal is always to provide you with excellent care. Hearing back from our patients is one way we can continue to improve our services. Please take a few minutes to complete the written survey that you may receive in the mail after you visit with us. Thank you!        Patient Information     Date Of Birth          1975        About your hospital stay     You were admitted on:  August 10, 2017 You last received care in the:  [facility] Post [facility]est[facility]    You were discharged on:  August 10, 2017       Who to Call     For medical emergencies, please call 911.  For non-urgent questions about your medical care, please call your primary care provider or clinic, [phone]  For questions related to your surgery, please call your surgery clinic        Attending Provider     Provider Specialty    Michael Chandler MD [facility]       Primary Care Provider Office Phone #    [facility] [phone]      Your next 10 appointments already scheduled     Aug 16, 2017 10:30 AM CDT   SHORT with Michael Chandler MD   [facility]    [address] [place] [id number] [phone]              Further instructions from your care team       Discharge instructions for Dr. Chandler:         You will need to schedule a post operative appointment  Within the next week.. Please call [phone] or [phone]  to speak to Dr Chandler's nurse  or else call the main appointments line at [phone] if she is not available.         If you have unusually heavy vaginal bleeding, severe nausea with vomiting, or increased pain, or fever, call us at that same number to be seen earlier, or go to the emergency room if after hours or we  are unavailable.          you should avoid intercourse for 1   week   after surgery.           you should not drive for 1 day   after surgery           you should not shower today but may resume showering tomorrow after the effects of anesthesia wear off.  Do not take a bath for 1  week   after surgery.    If you have questions do not hesitate to call the office at above number. Thank you.         Michael Chandler MD, FACOG, FAAFP              , OB/GYN  Department    You had acetaminophen (Tylenol) at 2:20 pm.    [facility]  Same-Day Surgery  Adult Discharge Orders & Instructions    For 24 hours after surgery    1. Get plenty of rest.  A responsible adult must stay with you for at least 24 hours after you leave the hospital.   2. Do not drive or use heavy equipment.  If you have weakness or tingling, don't drive or use heavy equipment until this feeling goes away.  3. Do not drink alcohol.  4. Avoid strenuous or risky activities.  Ask for help when climbing stairs.   5. You may feel lightheaded.  IF so, sit for a few minutes before standing.  Have someone help you get up.   6. If you have nausea (feel sick to your stomach): Drink only clear liquids such as apple juice, ginger ale, broth or 7-Up.  Rest may also help.  Be sure to drink enough fluids.  Move to a regular diet as you feel able.  7. You may have a slight fever. Call the doctor if your fever is over 100 F (37.7 C) (taken under the tongue) or lasts longer than 24 hours.  8. You may have a dry mouth, a sore throat, muscle aches or trouble sleeping.  These should go away after 24 hours.  9. Do not make important or legal decisions.   Call your doctor for any of the followin.  Signs of infection (fever, growing tenderness at the surgery site, a large amount of drainage or bleeding, severe pain, foul-smelling drainage, redness, swelling).    2. It has been over 8 to 10 hours since surgery and you are still not able to  "urinate (pass water).    3.  Headache for over 24 hours.    To contact your surgeon, call:      [phone] Nurse Advice Line (answered 24 hours a day)      [phone] [facility]          Pending Results     Date and Time Order Name Status Description    8/10/2017 1315 Surgical pathology exam In process     8/10/2017 1245 US Pelvic Complete w Transvaginal Portable In process             Admission Information     Date & Time Provider Department Dept. Phone    8/10/2017 Michael Chandler MD [facility] Post Anesthesia [facility] [phone]      Your Vitals Were     Blood Pressure Pulse Temperature Respirations Pulse Oximetry       152/90 96 98.6  F (37  C) (Oral) 16 97%       MyChart Information     MyChart lets you send messages to your doctor, view your test results, renew your prescriptions, schedule appointments and more. To sign up, go to [email or web address] . Click on \"Log in\" on the left side of the screen, which will take you to the Welcome page. Then click on \"Sign up Now\" on the right side of the page.     You will be asked to enter the access code listed below, as well as some personal information. Please follow the directions to create your username and password.     Your access code is: [email or web address]  Expires: 2017  2:47 PM     Your access code will  in 90 days. If you need help or a new code, please call your [facility] clinic or [phone].        Care EveryWhere ID     This is your Care EveryWhere ID. This could be used by other organizations to access your [facility] medical records  [id number]        Equal Access to Services     DIONNE GALLARDO : Hadmaxine Holt, wasimida yazmin, qaybta kaalyadi brown. So [facility] [phone].    ATENCIÓN: Si habla español, tiene a rueda disposición servicios gratuitos de asistencia lingüística. Llame al [phone].    We comply with applicable federal " civil rights laws and [place] laws. We do not discriminate on the basis of race, color, national origin, age, disability sex, sexual orientation or gender identity.               Review of your medicines      CONTINUE these medicines which have NOT CHANGED        Dose / Directions    cetirizine 10 MG tablet   Commonly known as:  zyrTEC   Used for:  Seasonal allergic rhinitis, unspecified chronicity, unspecified trigger        Dose:  10 mg   Take 1 tablet (10 mg) by mouth every evening   Quantity:  30 tablet   Refills:  3       LORazepam 1 MG tablet   Commonly known as:  ATIVAN   Used for:  Anxiety        Dose:  0.5 mg   Take 0.5 tablets (0.5 mg) by mouth every 8 hours as needed for anxiety   Quantity:  20 tablet   Refills:  0       medroxyPROGESTERone 150 MG/ML injection   Commonly known as:  DEPO-PROVERA        Dose:  150 mg   Inject 1 mL (150 mg) into the muscle every 3 months   Quantity:  3 mL   Refills:  3       omeprazole 20 MG tablet   Used for:  Gastroesophageal reflux disease, esophagitis presence not specified        Dose:  20 mg   Take 1 tablet (20 mg) by mouth 2 times daily Take 30-60 minutes before a meal.   Quantity:  60 tablet   Refills:  3       ondansetron 4 MG tablet   Commonly known as:  ZOFRAN   Used for:  Anxiety attack        TAKE ONE TABLET BY MOUTH EVERY 6 HOURS AS NEEDED FOR NAUSEA OR VOMITING   Quantity:  18 tablet   Refills:  10       order for DME   Used for:  Elevated blood pressure reading without diagnosis of hypertension        Blood pressure cuff   Quantity:  1 each   Refills:  0       QUEtiapine 50 MG tablet   Commonly known as:  SEROQUEL   Used for:  Anxiety, Psychophysiological insomnia        Dose:   mg   Take 1-2 tablets ( mg) by mouth nightly as needed   Quantity:  60 tablet   Refills:  3       sertraline 100 MG tablet   Commonly known as:  ZOLOFT   Used for:  Anxiety        Dose:  100 mg   Take 1 tablet (100 mg) by mouth daily   Quantity:  90 tablet   Refills:  1        sucralfate 1 GM tablet   Commonly known as:  CARAFATE   Used for:  Gastroesophageal reflux disease, esophagitis presence not specified        Dose:  1 g   Take 1 tablet (1 g) by mouth 4 times daily   Quantity:  120 tablet   Refills:  1                Protect others around you: Learn how to safely use, store and throw away your medicines at www.disposemymeds.org.             Medication List: This is a list of all your medications and when to take them. Check marks below indicate your daily home schedule. Keep this list as a reference.      Medications           Morning Afternoon Evening Bedtime As Needed    cetirizine 10 MG tablet   Commonly known as:  zyrTEC   Take 1 tablet (10 mg) by mouth every evening                                LORazepam 1 MG tablet   Commonly known as:  ATIVAN   Take 0.5 tablets (0.5 mg) by mouth every 8 hours as needed for anxiety                                medroxyPROGESTERone 150 MG/ML injection   Commonly known as:  DEPO-PROVERA   Inject 1 mL (150 mg) into the muscle every 3 months                                omeprazole 20 MG tablet   Take 1 tablet (20 mg) by mouth 2 times daily Take 30-60 minutes before a meal.                                ondansetron 4 MG tablet   Commonly known as:  ZOFRAN   TAKE ONE TABLET BY MOUTH EVERY 6 HOURS AS NEEDED FOR NAUSEA OR VOMITING                                order for DME   Blood pressure cuff                                QUEtiapine 50 MG tablet   Commonly known as:  SEROQUEL   Take 1-2 tablets ( mg) by mouth nightly as needed                                sertraline 100 MG tablet   Commonly known as:  ZOLOFT   Take 1 tablet (100 mg) by mouth daily                                sucralfate 1 GM tablet   Commonly known as:  CARAFATE   Take 1 tablet (1 g) by mouth 4 times daily

## 2017-08-10 NOTE — TELEPHONE ENCOUNTER
Panel Management Review      Patient has the following on her problem list: None      Composite cancer screening  Chart review shows that this patient is due/due soon for the following None  Summary:    Patient is due/failing the following:       Action needed:   Patient needs fasting lab only appointment    Type of outreach:    Phone, spoke to patient.  She is having a procedure today and they are going to draw blood so she said she will ask them to take the labs for DJ as well.    Questions for provider review:    None                                                                                                                                    Zenon Steiner CMA       Chart routed to Closed .

## 2017-08-11 ENCOUNTER — TELEPHONE (OUTPATIENT)
Dept: FAMILY MEDICINE | Facility: OTHER | Age: 42
End: 2017-08-11

## 2017-08-11 DIAGNOSIS — E78.5 HYPERLIPIDEMIA LDL GOAL <100: Primary | ICD-10-CM

## 2017-08-11 RX ORDER — ATORVASTATIN CALCIUM 20 MG/1
20 TABLET, FILM COATED ORAL DAILY
Qty: 90 TABLET | Refills: 1 | Status: SHIPPED | OUTPATIENT
Start: 2017-08-11 | End: 2017-12-21

## 2017-08-11 NOTE — PROGRESS NOTES
All of your labs were normal for you except your cholesterol.  This is high enough, I'd recommend taking a cholesterol medication to reduce your risk of heart attack and stroke.  Let me know where you'd like that sent.    Have a nice day!    Dr. Chino    The 10-year ASCVD risk score (Ellie BREWER Jr, et al., 2013) is: 12.7%    Values used to calculate the score:      Age: 41 years      Sex: Female      Is Non- : No      Diabetic: No      Tobacco smoker: Yes      Systolic Blood Pressure: 124 mmHg      Is BP treated: No      HDL Cholesterol: 30 mg/dL      Total Cholesterol: 287 mg/dL

## 2017-08-11 NOTE — TELEPHONE ENCOUNTER
"Please send to [facility] in [place] please, pt although \"bumbed about cholesterol\" verbalizes understanding.  "

## 2017-08-11 NOTE — TELEPHONE ENCOUNTER
[facility]MT[facility], please inform patient of results below, thanks  Jahaira Rivers RT (R)      Notes Recorded by Clemente Chino MD on 8/10/2017 at 9:13 PM  All of your labs were normal for you except your cholesterol.  This is high enough, I'd recommend taking a cholesterol medication to reduce your risk of heart attack and stroke.  Let me know where you'd like that sent.    Have a nice day!    Dr. Chino

## 2017-08-14 ENCOUNTER — TELEPHONE (OUTPATIENT)
Dept: OBGYN | Facility: CLINIC | Age: 42
End: 2017-08-14

## 2017-08-14 LAB — COPATH REPORT: NORMAL

## 2017-08-14 NOTE — TELEPHONE ENCOUNTER
Spoke to patient, she is feeling much better and is not longer requesting any medication.  Frida Navas CMA

## 2017-08-14 NOTE — TELEPHONE ENCOUNTER
Patient called and left a message on my voice mail this weekend.  Patient stated that she received a call from an OR Nurse this weekend asking how she was feeling after surgery.  Patient stated that she was having a lot of Cramping after Colposcopy with Cervical Biopsies and Endometrial Biopsy, Exam with Ultrasound on 8/10/17.  Patient was advised by the nurse to call Dr. Chandler to see if he could prescribe something stronger for her to take.  Provider please advise.  Neeraj Penn MA

## 2017-08-15 ENCOUNTER — TELEPHONE (OUTPATIENT)
Dept: FAMILY MEDICINE | Facility: OTHER | Age: 42
End: 2017-08-15

## 2017-08-15 NOTE — TELEPHONE ENCOUNTER
Patient's insurance does not cover twice daily dosing and 120/365 days of Omeprazole without PA. [facility] [phone] ID#[id number]  Kay Abbott, [facility]  [phone]

## 2017-08-15 NOTE — TELEPHONE ENCOUNTER
[facility] will only cover 120 days worth of medication for a 365 day period.  Omeprazole is also available over the counter.     Letter sent to patient informing them of this  Closing encounter  Jahaira DESOUZA (R)

## 2017-08-15 NOTE — LETTER
[facility]  [id number] [place] [id number] [phone]        August 15, 2017    Radha Beckwith  [address]  [place] [address]          Dear Radha,    We are writing to inform you that your insurance through [employer] will only cover 120 days worth of medication for a 365 day period.  Omeprazole  is also available over the counter.   Please feel free to contact your insurance company by calling the number that is on the back of your insurance card.     Thank you,     Your [facility] Team

## 2017-08-16 ENCOUNTER — OFFICE VISIT (OUTPATIENT)
Dept: FAMILY MEDICINE | Facility: CLINIC | Age: 42
End: 2017-08-16
Payer: COMMERCIAL

## 2017-08-16 VITALS
HEART RATE: 84 BPM | BODY MASS INDEX: 24.96 KG/M2 | TEMPERATURE: 97.2 F | WEIGHT: 150 LBS | DIASTOLIC BLOOD PRESSURE: 74 MMHG | OXYGEN SATURATION: 100 % | RESPIRATION RATE: 16 BRPM | SYSTOLIC BLOOD PRESSURE: 102 MMHG

## 2017-08-16 DIAGNOSIS — Z09 POSTOPERATIVE EXAMINATION: Primary | ICD-10-CM

## 2017-08-16 PROCEDURE — 99024 POSTOP FOLLOW-UP VISIT: CPT | Performed by: OBSTETRICS & GYNECOLOGY

## 2017-08-16 ASSESSMENT — PAIN SCALES - GENERAL: PAINLEVEL: NO PAIN (0)

## 2017-08-16 NOTE — MR AVS SNAPSHOT
"              After Visit Summary   2017    Radha Beckwith    MRN: [id number]           Patient Information     Date Of Birth          1975        Visit Information        Provider Department      2017 10:30 AM Michael Chandler MD [facility]         Follow-ups after your visit        Who to contact     If you have questions or need follow up information about today's clinic visit or your schedule please contact [facility] directly at [phone].  Normal or non-critical lab and imaging results will be communicated to you by MyChart, letter or phone within 4 business days after the clinic has received the results. If you do not hear from us within 7 days, please contact the clinic through [facility] or phone. If you have a critical or abnormal lab result, we will notify you by phone as soon as possible.  Submit refill requests through [facility] or call your pharmacy and they will forward the refill request to us. Please allow 3 business days for your refill to be completed.          Additional Information About Your Visit        [facility] Information     [facility]bird lets you send messages to your doctor, view your test results, renew your prescriptions, schedule appointments and more. To sign up, go to [email or web address] . Click on \"Log in\" on the left side of the screen, which will take you to the Welcome page. Then click on \"Sign up Now\" on the right side of the page.     You will be asked to enter the access code listed below, as well as some personal information. Please follow the directions to create your username and password.     Your access code is: [email or web address]  Expires: 2017  2:47 PM     Your access code will  in 90 days. If you need help or a new code, please call your [facility] or [phone].        Care EveryWhere ID     This is your Care EveryWhere ID. This could be used by other organizations to access your [facility] " records  [id number]        Your Vitals Were     Pulse Temperature Respirations Pulse Oximetry Breastfeeding? BMI (Body Mass Index)    84 97.2  F (36.2  C) (Tympanic) 16 100% No 24.96 kg/m2       Blood Pressure from Last 3 Encounters:   08/16/17 102/74   08/10/17 (!) 124/95   08/09/17 126/78    Weight from Last 3 Encounters:   08/16/17 150 lb (68 kg)   08/09/17 151 lb 12.8 oz (68.9 kg)   08/03/17 154 lb (69.9 kg)              Today, you had the following     No orders found for display       Primary Care Provider Office Phone #    [facility] [phone]       No address on file        Equal Access to Services     DIONNE GALLARDO : Calderon Holt, wasimida luqadaha, qaybta kaalmada adebenjaminyacrow, yadi luong. So [facility] [phone].    ATENCIÓN: Si habla español, tiene a rueda disposición servicios gratuitos de asistencia lingüística. Llame al [phone].    We comply with applicable federal civil rights laws and [place] laws. We do not discriminate on the basis of race, color, national origin, age, disability sex, sexual orientation or gender identity.            Thank you!     Thank you for choosing [facility]  for your care. Our goal is always to provide you with excellent care. Hearing back from our patients is one way we can continue to improve our services. Please take a few minutes to complete the written survey that you may receive in the mail after your visit with us. Thank you!             Your Updated Medication List - Protect others around you: Learn how to safely use, store and throw away your medicines at www.disposemymeds.org.          This list is accurate as of: 8/16/17 10:48 AM.  Always use your most recent med list.                   Brand Name Dispense Instructions for use Diagnosis    atorvastatin 20 MG tablet    LIPITOR    90 tablet    Take 1 tablet (20 mg) by mouth daily    Hyperlipidemia LDL goal <100       cetirizine 10 MG  tablet    zyrTEC    30 tablet    Take 1 tablet (10 mg) by mouth every evening    Seasonal allergic rhinitis, unspecified chronicity, unspecified trigger       LORazepam 1 MG tablet    ATIVAN    20 tablet    Take 0.5 tablets (0.5 mg) by mouth every 8 hours as needed for anxiety    Anxiety       medroxyPROGESTERone 150 MG/ML injection    DEPO-PROVERA    3 mL    Inject 1 mL (150 mg) into the muscle every 3 months        omeprazole 20 MG tablet     60 tablet    Take 1 tablet (20 mg) by mouth 2 times daily Take 30-60 minutes before a meal.    Gastroesophageal reflux disease, esophagitis presence not specified       ondansetron 4 MG tablet    ZOFRAN    18 tablet    TAKE ONE TABLET BY MOUTH EVERY 6 HOURS AS NEEDED FOR NAUSEA OR VOMITING    Anxiety attack       order for DME     1 each    Blood pressure cuff    Elevated blood pressure reading without diagnosis of hypertension       QUEtiapine 50 MG tablet    SEROQUEL    60 tablet    Take 1-2 tablets ( mg) by mouth nightly as needed    Anxiety, Psychophysiological insomnia       sertraline 100 MG tablet    ZOLOFT    90 tablet    Take 1 tablet (100 mg) by mouth daily    Anxiety       sucralfate 1 GM tablet    CARAFATE    120 tablet    Take 1 tablet (1 g) by mouth 4 times daily    Gastroesophageal reflux disease, esophagitis presence not specified

## 2017-08-16 NOTE — NURSING NOTE
"Chief Complaint   Patient presents with     Surgical Followup     DOS:8/10/17 [place], endo biopsy       Initial /74 (Cuff Size: Adult Regular)  Pulse 84  Temp 97.2  F (36.2  C) (Tympanic)  Resp 16  Wt 150 lb (68 kg)  SpO2 100%  Breastfeeding? No  BMI 24.96 kg/m2 Estimated body mass index is 24.96 kg/(m^2) as calculated from the following:    Height as of 8/9/17: 5' 5\" (1.651 m).    Weight as of this encounter: 150 lb (68 kg).  Medication Reconciliation: complete   Neeraj Penn MA       "

## 2017-08-16 NOTE — PROGRESS NOTES
Subjective: Radha is here for postop check from colposcopy and US. The colp bx showed:    SPECIMEN(S):   A: Endometrial curettings   B: Cervical biopsy, 9 o'clock   C: Cervical biopsy, 2 o'clock   D: Cervical biopsy, 7 o'clock     FINAL DIAGNOSIS:   A. Cervix, endometrial curettings:   - Cervical glandular and squamous epithelium with no evidence of   dysplasia or malignancy.     B. Cervix, 9 o'clock, biopsy:   - Focal squamous atypia; cannot exclude koilocytosis.   - No evidence of high grade squamous intraepithelial lesion or   malignancy.   - No cervical glandular mucosa seen.     C. Cervix, 2 o'clock, biopsy:   - Squamous mucosa with no evidence of dysplasia or malignancy.   - No cervical glandular mucosa seen.     D. Cervix, 7 o'clock, biopsy:   - Squamous mucosa with no evidence of dysplasia or malignancy.   - A very small area of cervical glandular mucosa with no diagnostic   alterations.     COMMENT:   The previous Pap smear [id number] was reviewed and the diagnosis of LSIL   was confirmed.     Electronically signed out by:     Charles Holland M.D., PhD       The Us showed:    FINDINGS: The uterus is of normal size and appearance measuring 7.0 x  3.9 x 4.1 cm. Endometrial stripe is of normal appearance at 0.4 cm.     The right ovary is normal in appearance measuring 1.9 x 2.1 x 1.8 cm.     Left ovary measures 2.3 x 2.3 x 2.7 cm and contains a cyst likely  representing a follicle measuring up to 2.0 cm. A cyst was seen in the  contralateral ovary on 3/24/2017 CT.     No abnormal free fluid collection is identified.         IMPRESSION: Cyst in the left ovary likely represents a dominant  follicle. Otherwise negative pelvic ultrasound.     WILTON HARGROVE MD      . She reports normal bowel and bladder function and no excessive vaginal bleeding.  Objective. VSS as shown in chart. /74 (Cuff Size: Adult Regular)  Pulse 84  Temp 97.2  F (36.2  C) (Tympanic)  Resp 16  Wt 150 lb (68 kg)  SpO2 100%   Breastfeeding? No  BMI 24.96 kg/m2   chest is clear to auscultation and cardiac exam is normal. Pelvic exam is not done because pt is asymptomatic. pathology is reviewed with patient and is  Benign.- perhaps one small area of dysplasia on the cervical bx- it was hard to tell.  Assessment: Normal postop exam.  However, she has very painful menses when they do occur- she is on depo-=provera to prevent them but she doesn't like the side effects of the depo=   Also, she has very painful vaginal exams.  That combined with the need for yearly paps requiring exam under anesthesia is the reason she is requesting a hysterectomy. She has painful menses, heavy bleeding with each menses, and now abnormal paps and difficulty with pelvic exams.       Plan: .We will submit a request for authorization for a hysterectomy based upon my concern about how to check her for dysplasia unless we do a yearly exam under anesthesia. rechekc with me in 2 weeks. Ramesh PATEL

## 2017-08-28 ENCOUNTER — TELEPHONE (OUTPATIENT)
Dept: FAMILY MEDICINE | Facility: OTHER | Age: 42
End: 2017-08-28

## 2017-08-28 ENCOUNTER — TELEPHONE (OUTPATIENT)
Dept: FAMILY MEDICINE | Facility: CLINIC | Age: 42
End: 2017-08-28

## 2017-08-28 ENCOUNTER — OFFICE VISIT (OUTPATIENT)
Dept: FAMILY MEDICINE | Facility: OTHER | Age: 42
End: 2017-08-28
Payer: COMMERCIAL

## 2017-08-28 ENCOUNTER — TELEPHONE (OUTPATIENT)
Dept: OBGYN | Facility: CLINIC | Age: 42
End: 2017-08-28

## 2017-08-28 VITALS
SYSTOLIC BLOOD PRESSURE: 104 MMHG | TEMPERATURE: 98.6 F | DIASTOLIC BLOOD PRESSURE: 76 MMHG | RESPIRATION RATE: 20 BRPM | HEART RATE: 117 BPM | BODY MASS INDEX: 25.29 KG/M2 | WEIGHT: 152 LBS | OXYGEN SATURATION: 99 %

## 2017-08-28 DIAGNOSIS — F41.9 ANXIETY: ICD-10-CM

## 2017-08-28 DIAGNOSIS — Z01.812 PRE-PROCEDURE LAB EXAM: Primary | ICD-10-CM

## 2017-08-28 DIAGNOSIS — L23.7 CONTACT DERMATITIS DUE TO POISON IVY: Primary | ICD-10-CM

## 2017-08-28 PROCEDURE — 99213 OFFICE O/P EST LOW 20 MIN: CPT | Performed by: NURSE PRACTITIONER

## 2017-08-28 RX ORDER — LORAZEPAM 1 MG/1
0.5 TABLET ORAL EVERY 8 HOURS PRN
Qty: 20 TABLET | Refills: 0 | Status: SHIPPED | OUTPATIENT
Start: 2017-08-28 | End: 2017-09-28

## 2017-08-28 RX ORDER — LORAZEPAM 1 MG/1
0.5 TABLET ORAL EVERY 8 HOURS PRN
Qty: 20 TABLET | Refills: 0 | Status: CANCELLED | OUTPATIENT
Start: 2017-08-28

## 2017-08-28 RX ORDER — PREDNISONE 20 MG/1
TABLET ORAL
Qty: 20 TABLET | Refills: 0 | Status: SHIPPED | OUTPATIENT
Start: 2017-08-28 | End: 2017-09-12

## 2017-08-28 ASSESSMENT — PAIN SCALES - GENERAL: PAINLEVEL: EXTREME PAIN (8)

## 2017-08-28 NOTE — TELEPHONE ENCOUNTER
LORAZEPAM      Last Written Prescription Date:  7/27/17  Last Fill Quantity: 20,   # refills: 0  Last Office Visit with [facility], [facility] or [facility] prescribing provider: 8/28/17  Future Office visit:    Next 5 appointments (look out 90 days)     Aug 29, 2017  8:30 AM CDT   [facility]    [address] [place] [id number] [phone]                   Routing refill request to provider for review/approval because:  Drug not on the [facility], [facility] or [facility] refill protocol or controlled substance

## 2017-08-28 NOTE — TELEPHONE ENCOUNTER
Called pt again.  After I started speaking with the pt, I noticed that she had called back after losing connection with me and spoke to another RN.  I relayed Dr. Chino message regarding poison ivy can worsen up to 24 hours after starting treatment.  I advised her to call back tomorrow if symptoms fail to start improving.    Pt states that the other RN was going to send a message to Dr. Chino.  We will get back to pt after Dr. Chino responds again.    Doretha Hernandez RN

## 2017-08-28 NOTE — TELEPHONE ENCOUNTER
Will have RN triage.    It's not unusual for poison ivy to worsen for up to 24 hours after treatments is started as the medication takes time to work, even the oral medications.

## 2017-08-28 NOTE — TELEPHONE ENCOUNTER
Attempted to reach pt.  Pt's phone was cutting in and out and wasn't able to understand pt.  It eventually cut out.  Attempted again and the same thing.  Will try back later.      Doretha Hernandez RN

## 2017-08-28 NOTE — TELEPHONE ENCOUNTER
Routing to DJ to review and advise. Patient was seen for Poison Ivy today in [place]. Patient stated it is starting to spread more and is getting more painful. Patient is requesting a pain medication to decrease her discomfort. Patient is also requesting refill on Lorazepam (there is another encounter open for this). Allergies and pharmacy reviewed by RN. Please review and advise.     Radha Beckwith is a 41 year old female who calls with rash.    NURSING ASSESSMENT:  Description:  Patient has poison ivy on her face, on her arm, neck, legs, and ear. Increasing getting worse. Was prescribed prednisone today. Since office visit, the poison ivy in her elbow has gotten worse and feels like it is pounding and painful. Reviewed allergies, pharmacy and home care measures.  Asking for a Lorazepam refill and a pain medication to allow her to sleep.   Onset/duration:  Noticed yesterday 08/27/2017  Pain scale (0-10)   8-9/10 painful and pounding  LMP/preg/breast feeding:  No LMP recorded. Patient has had an injection.  Last exam/Treatment:  08/28/2017  Allergies:   Allergies   Allergen Reactions     Cafergot      12-            GI problems-     Compazine      Droperidol      Nubain [Nalbuphine Hcl]      Seasonal Allergies      Sumatriptan      vomits after giving herself a shot     Prochlorperazine Palpitations     Uncontrolled movement     NURSING PLAN: Routed to provider Yes    RECOMMENDED DISPOSITION:  Home care advice - rash, adult  Will comply with recommendation: Yes  If further questions/concerns or if symptoms do not improve, worsen or new symptoms develop, call your PCP or [facility] as soon as possible.    NOTES:  Disposition was determined by the first positive assessment question, therefore all previous assessment questions were negative    Guideline used:  Telephone Triage Protocols for Nurses, Fifth Edition, Jyoti Cast, Adult  Nursing Judgment  Routing to Dr Matti Morse,  RN, BSN

## 2017-08-28 NOTE — PROGRESS NOTES
SUBJECTIVE:   Radha Beckwith is a 41 year old female who presents to clinic today for the following health issues:      Rash      Duration: 1 day    Description  Location: face, neck, arms, hands  Itching: severe    Intensity:  severe    Accompanying signs and symptoms: pain    History (similar episodes/previous evaluation): usually gets poison ivy almost annually    Precipitating or alleviating factors:  New exposures:  Wild flowers  Recent travel: no      Therapies tried: triamcinolone and cold pack        Problem list and histories reviewed & adjusted, as indicated.  Additional history: none    Patient Active Problem List   Diagnosis     GERD (gastroesophageal reflux disease)     Restless legs     Anxiety     Ingrowing nail     Hyperlipidemia LDL goal <100     Hypokalemia     Atypical chest pain     Tobacco abuse     Gastric ulcer     Anxiety attack     Opioid dependence in remission (H)     Papanicolaou smear of cervix with low grade squamous intraepithelial lesion (LGSIL)     Past Surgical History:   Procedure Laterality Date     BIOPSY CERVICAL, LOCAL EXCISION, SINGLE/MULTIPLE N/A 8/10/2017    Procedure: BIOPSY CERVICAL, LOCAL EXCISION, SINGLE/MULTIPLE;;  Surgeon: Michael Chandler MD;  Location: [facility] OR     COLPOSCOPY, BIOPSY, COMBINED N/A 8/10/2017    Procedure: COMBINED COLPOSCOPY, BIOPSY;  Colposcopy with Cervical Biopsies and Endometrial Biopsy, Exam with Ultrasound;  Surgeon: Michael Chandler MD;  Location: [facility] OR     ESOPHAGOSCOPY, GASTROSCOPY, DUODENOSCOPY (EGD), COMBINED N/A 4/17/2017    Procedure: COMBINED ESOPHAGOSCOPY, GASTROSCOPY, DUODENOSCOPY (EGD);  Surgeon: Ibrahima Esposito MD;  Location: [facility] GI     EXAM UNDER ANESTHESIA PELVIC N/A 8/10/2017    Procedure: EXAM UNDER ANESTHESIA PELVIC;;  Surgeon: Michael Chandler MD;  Location: [facility] OR     [facility] UGI ENDOSCOPY, SIMPLE EXAM  01/07/08       Social History   Substance Use Topics     Smoking status: Current Every Day  Smoker     Packs/day: 0.25     Years: 20.00     Types: Cigarettes     Smokeless tobacco: Never Used      Comment: 5-6 cigs daily     Alcohol use Yes      Comment: occasional drinks     Family History   Problem Relation Age of Onset     Depression Mother      Respiratory Mother      Chronic Obstructive Pulmonary Disease Mother      CEREBROVASCULAR DISEASE Father      Brain anyeurism     Adrenal Disorder Other      Chronic Obstructive Pulmonary Disease Other          Current Outpatient Prescriptions   Medication Sig Dispense Refill     predniSONE (DELTASONE) 20 MG tablet Take 3 tabs (60 mg) by mouth daily x 3 days, 2 tabs (40 mg) daily x 3 days, 1 tab (20 mg) daily x 3 days, then 1/2 tab (10 mg) x 3 days. 20 tablet 0     atorvastatin (LIPITOR) 20 MG tablet Take 1 tablet (20 mg) by mouth daily 90 tablet 1     cetirizine (ZYRTEC) 10 MG tablet Take 1 tablet (10 mg) by mouth every evening 30 tablet 3     LORazepam (ATIVAN) 1 MG tablet Take 0.5 tablets (0.5 mg) by mouth every 8 hours as needed for anxiety 20 tablet 0     medroxyPROGESTERone (DEPO-PROVERA) 150 MG/ML injection Inject 1 mL (150 mg) into the muscle every 3 months 3 mL 3     sertraline (ZOLOFT) 100 MG tablet Take 1 tablet (100 mg) by mouth daily 90 tablet 1     order for DME Blood pressure cuff 1 each 0     omeprazole 20 MG tablet Take 1 tablet (20 mg) by mouth 2 times daily Take 30-60 minutes before a meal. 60 tablet 3     ondansetron (ZOFRAN) 4 MG tablet TAKE ONE TABLET BY MOUTH EVERY 6 HOURS AS NEEDED FOR NAUSEA OR VOMITING 18 tablet 10     sucralfate (CARAFATE) 1 GM tablet Take 1 tablet (1 g) by mouth 4 times daily 120 tablet 1     QUEtiapine (SEROQUEL) 50 MG tablet Take 1-2 tablets ( mg) by mouth nightly as needed 60 tablet 3     Allergies   Allergen Reactions     Cafergot      12-            GI problems-     Compazine      Droperidol      Nubain [Nalbuphine Hcl]      Seasonal Allergies      Sumatriptan      vomits after giving herself a shot      Prochlorperazine Palpitations     Uncontrolled movement     BP Readings from Last 3 Encounters:   08/28/17 104/76   08/16/17 102/74   08/10/17 (!) 124/95    Wt Readings from Last 3 Encounters:   08/28/17 152 lb (68.9 kg)   08/16/17 150 lb (68 kg)   08/09/17 151 lb 12.8 oz (68.9 kg)                        Reviewed and updated as needed this visit by clinical staffTobacco  Allergies  Meds  Med Hx  Soc Hx      Reviewed and updated as needed this visit by Provider         ROS:  C: NEGATIVE for fever, chills, change in weight  INTEGUMENTARY/SKIN: rash as above   E/M: NEGATIVE for ear, mouth and throat problems  R: NEGATIVE for significant cough or SOB  CV: NEGATIVE for chest pain, palpitations or peripheral edema    OBJECTIVE:     /76  Pulse 117  Temp 98.6  F (37  C) (Tympanic)  Resp 20  Wt 152 lb (68.9 kg)  SpO2 99%  Breastfeeding? No  BMI 25.29 kg/m2  Body mass index is 25.29 kg/(m^2).  GENERAL: healthy, alert and no distress  NECK: no adenopathy, no asymmetry, masses, or scars and thyroid normal to palpation  RESP: lungs clear to auscultation - no rales, rhonchi or wheezes  CV: regular rate and rhythm, normal S1 S2, no S3 or S4, no murmur, click or rub, no peripheral edema and peripheral pulses strong  SKIN: vesicular, linear rash on both arms, neck, chest and chin.  It is consistent with a plant based dermatitis.     Diagnostic Test Results:  none     ASSESSMENT/PLAN:         1. Contact dermatitis due to poison ivy  - predniSONE (DELTASONE) 20 MG tablet; Take 3 tabs (60 mg) by mouth daily x 3 days, 2 tabs (40 mg) daily x 3 days, 1 tab (20 mg) daily x 3 days, then 1/2 tab (10 mg) x 3 days.  Dispense: 20 tablet; Refill: 0    FUTURE APPOINTMENTS:       - Follow-up for annual visit or as needed  See Patient Instructions    JEANNIE Bray [facility]     none

## 2017-08-28 NOTE — PATIENT INSTRUCTIONS
Take the Prednisone with food as prescribed.     Take Benadryl over the counter for itching as well.     Follow up if symptoms fail to resolve as expected.       Carpal Tunnel Syndrome    Carpal tunnel syndrome is a painful condition of the wrist and arm. It is caused by pressure on the median nerve.  The median nerve is one of the nerves that give feeling and movement to the hand. It passes through a tunnel in the wrist called the carpal tunnel. This tunnel is made up of bones and ligaments. Narrowing of this tunnel or swelling of the tissues inside the tunnel puts pressure on the median nerve. This causes numbness, pins and needles, or electric shooting pains in your hand and forearm. Often the pain is worse at night and may wake you when you are asleep.  Carpal tunnel syndrome may occur during pregnancy and with use of birth control pills. It is more common in workers who must often bend their wrists. It is also common in people who work with power tools that cause strong vibrations.  Home care    Rest the painful wrist. Avoid repeated bending of the wrist back and forth. This puts pressure on the median nerve. Avoid using power tools with strong vibrations.    If you were given a splint, wear it at night while you sleep. You may also wear it during the day for comfort.    Move your fingers and wrists often to avoid stiffness.    Elevate your arms on pillows when you lie down.    Try using the unaffected hand more.    Try not to hold your wrists in a bent, downward position.    Sometimes changes in the work place may ease symptoms. If you type most of the day, it may help to change the position of your keyboard or add a wrist support. Your wrist should be in a neutral position and not bent back when typing.    You may use over-the-counter pain medicine to treat pain and inflammation, unless another medicine was prescribed. Anti-inflammatory pain medicines, such as ibuprofen or naproxen may be more effective than  acetaminophen, which treats pain, but not inflammation. If you have chronic liver or kidney disease or ever had a stomach ulcer or GI bleeding, talk with your doctor before using these medicines.    Opioid pain medicine will only give temporary relief and does not treat the problem. If pain continues, you may need a shot of a steroid drug into your wrist.    If the above methods fail, you may need surgery. This will open the carpal tunnel and release the pressure on the trapped nerve.  Follow-up care  Follow up with your healthcare provider, or as advised, if the pain doesn t begin to improve within the next week.  If X-rays were taken, you will be notified of any new findings that may affect your care.  When to seek medical advice  Call your healthcare provider right away if any of these occur:    Pain not improving with the above treatment    Fingers or hand become cold, blue, numb, or tingly    Your whole arm becomes swollen or weak  Date Last Reviewed: 11/23/2015 2000-2017 [employer]. [address] [place] [address], [place] [address]. All rights reserved. This information is not intended as a substitute for professional medical care. Always follow your healthcare professional's instructions.

## 2017-08-28 NOTE — TELEPHONE ENCOUNTER
I informed pt of the message below.  I huddled with DJ and he said she can take ibuprofen or tylenol for the pain.  If it's not getting better she should follow up with the provider she saw today for poison ivy to reevaluate.  I informed pt that her lorazepam medication is at the [facility] pharmacy for her.  Informed her she should make a follow up appt with Dr. Chino to see how this medication is working for her.  No further questions at this time.  Zenon Steiner, CMA

## 2017-08-28 NOTE — NURSING NOTE
"Chief Complaint   Patient presents with     Derm Problem     poison ivy x1 day       Initial /76  Pulse 117  Temp 98.6  F (37  C) (Tympanic)  Resp 20  Wt 152 lb (68.9 kg)  SpO2 99%  Breastfeeding? No  BMI 25.29 kg/m2 Estimated body mass index is 25.29 kg/(m^2) as calculated from the following:    Height as of 8/9/17: 5' 5\" (1.651 m).    Weight as of this encounter: 152 lb (68.9 kg).  Medication Reconciliation: complete   ................Meet Iglesias LPN,   August 28, 2017,      12:02 PM,   [facility]    "

## 2017-08-28 NOTE — TELEPHONE ENCOUNTER
"Surgery Scheduled    Date of Surgery 10/30/17 Time of Surgery 9:00am  Procedure: Total Laparoscopic Hysterectomy, Possible Bilateral Salpingo-Oophorectomy  Hospital/Surgical Facility: [facility]  Surgeon: Dr. Chandler, with Dr. Lindsay Assisting  Type of Anesthesia Anticipated: General  Pre-Op: 10/16/17 with Dr. Chino   Pre-Op Labs: 10/29/17  \"\"Pre-op Labs Ordered\"\"  Post-Op: 11/3/17 with Dr. Chandler  Consent Signed 10/24/17      Surgery packet will be given to patient at consent signing. Patient instructed to arrive 1 1/2 hour(s) prior to surgery.  Patient understood and agrees to the plan.      Neeraj Wilson MA      "

## 2017-08-28 NOTE — TELEPHONE ENCOUNTER
Reason for call:  Symptom  Reason for call:  Patient reporting a symptom    Symptom or request: poison ivy    Duration (how long have symptoms been present):     Have you been treated for this before? Yes    Additional comments: Pt was just seen today in  and since she was seen the poison ivy has gotten worse     Phone Number patient can be reached at:  Home number on file [phone] (home)    Best Time:  anytiem    Can we leave a detailed message on this number:  YES    Call taken on 8/28/2017 at 1:38 PM by Bree Mendoza

## 2017-08-28 NOTE — MR AVS SNAPSHOT
After Visit Summary   8/28/2017    Radha Beckwith    MRN: [id number]           Patient Information     Date Of Birth          1975        Visit Information        Provider Department      8/28/2017 11:40 AM Caro Tellez APRN [facility]        Today's Diagnoses     Contact dermatitis due to poison ivy    -  1      Care Instructions    Take the Prednisone with food as prescribed.     Take Benadryl over the counter for itching as well.     Follow up if symptoms fail to resolve as expected.       Carpal Tunnel Syndrome    Carpal tunnel syndrome is a painful condition of the wrist and arm. It is caused by pressure on the median nerve.  The median nerve is one of the nerves that give feeling and movement to the hand. It passes through a tunnel in the wrist called the carpal tunnel. This tunnel is made up of bones and ligaments. Narrowing of this tunnel or swelling of the tissues inside the tunnel puts pressure on the median nerve. This causes numbness, pins and needles, or electric shooting pains in your hand and forearm. Often the pain is worse at night and may wake you when you are asleep.  Carpal tunnel syndrome may occur during pregnancy and with use of birth control pills. It is more common in workers who must often bend their wrists. It is also common in people who work with power tools that cause strong vibrations.  Home care    Rest the painful wrist. Avoid repeated bending of the wrist back and forth. This puts pressure on the median nerve. Avoid using power tools with strong vibrations.    If you were given a splint, wear it at night while you sleep. You may also wear it during the day for comfort.    Move your fingers and wrists often to avoid stiffness.    Elevate your arms on pillows when you lie down.    Try using the unaffected hand more.    Try not to hold your wrists in a bent, downward position.    Sometimes changes in the work place may ease symptoms. If  you type most of the day, it may help to change the position of your keyboard or add a wrist support. Your wrist should be in a neutral position and not bent back when typing.    You may use over-the-counter pain medicine to treat pain and inflammation, unless another medicine was prescribed. Anti-inflammatory pain medicines, such as ibuprofen or naproxen may be more effective than acetaminophen, which treats pain, but not inflammation. If you have chronic liver or kidney disease or ever had a stomach ulcer or GI bleeding, talk with your doctor before using these medicines.    Opioid pain medicine will only give temporary relief and does not treat the problem. If pain continues, you may need a shot of a steroid drug into your wrist.    If the above methods fail, you may need surgery. This will open the carpal tunnel and release the pressure on the trapped nerve.  Follow-up care  Follow up with your healthcare provider, or as advised, if the pain doesn t begin to improve within the next week.  If X-rays were taken, you will be notified of any new findings that may affect your care.  When to seek medical advice  Call your healthcare provider right away if any of these occur:    Pain not improving with the above treatment    Fingers or hand become cold, blue, numb, or tingly    Your whole arm becomes swollen or weak  Date Last Reviewed: 11/23/2015 2000-2017 [employer]. [address] [place] [address]. All rights reserved. This information is not intended as a substitute for professional medical care. Always follow your healthcare professional's instructions.                Follow-ups after your visit        Your next 10 appointments already scheduled     Aug 29, 2017  8:30 AM CDT   [facility]   [facility]    [address] [place] [id number]   [phone]            Oct 30, 2017   Procedure with Michael  "Zack Chandler MD   [facility] ([facility])    [address]   [place] [id number] [phone]           From [address]: Exit at [address] on south side of [place]. Turn right on [address]. Turn left at stoplight on [address]. [facility] will be in view two blocks ahead              Who to contact     If you have questions or need follow up information about today's clinic visit or your schedule please contact [facility] directly at [phone].  Normal or non-critical lab and imaging results will be communicated to you by Octmamihart, letter or phone within 4 business days after the clinic has received the results. If you do not hear from us within 7 days, please contact the clinic through Octmamihart or phone. If you have a critical or abnormal lab result, we will notify you by phone as soon as possible.  Submit refill requests through Notice Kiosk or call your pharmacy and they will forward the refill request to us. Please allow 3 business days for your refill to be completed.          Additional Information About Your Visit        MyChart Information     Notice Kiosk lets you send messages to your doctor, view your test results, renew your prescriptions, schedule appointments and more. To sign up, go to [email or web address] . Click on \"Log in\" on the left side of the screen, which will take you to the Welcome page. Then click on \"Sign up Now\" on the right side of the page.     You will be asked to enter the access code listed below, as well as some personal information. Please follow the directions to create your username and password.     Your access code is: [email or web address]  Expires: 2017  2:47 PM     Your access code will  in 90 days. If you need help or a new code, please call your [facility] or [phone].        Care EveryWhere ID     This is your Care EveryWhere ID. This could be used by other organizations to " access your [facility] medical records  [id number]        Your Vitals Were     Pulse Temperature Respirations Pulse Oximetry Breastfeeding? BMI (Body Mass Index)    117 98.6  F (37  C) (Tympanic) 20 99% No 25.29 kg/m2       Blood Pressure from Last 3 Encounters:   08/28/17 104/76   08/16/17 102/74   08/10/17 (!) 124/95    Weight from Last 3 Encounters:   08/28/17 152 lb (68.9 kg)   08/16/17 150 lb (68 kg)   08/09/17 151 lb 12.8 oz (68.9 kg)              Today, you had the following     No orders found for display         Today's Medication Changes          These changes are accurate as of: 8/28/17 12:14 PM.  If you have any questions, ask your nurse or doctor.               Start taking these medicines.        Dose/Directions    predniSONE 20 MG tablet   Commonly known as:  DELTASONE   Used for:  Contact dermatitis due to poison ivy   Started by:  Caro Tellez APRN CNP        Take 3 tabs (60 mg) by mouth daily x 3 days, 2 tabs (40 mg) daily x 3 days, 1 tab (20 mg) daily x 3 days, then 1/2 tab (10 mg) x 3 days.   Quantity:  20 tablet   Refills:  0            Where to get your medicines      These medications were sent to [facility] [place], [place] - [address]  [address] , [facility] [address]     Phone:  [phone]     predniSONE 20 MG tablet                Primary Care Provider Office Phone #    [facility] [phone]       No address on file        Equal Access to Services     JAELYN GALLARDO AH: Hadii aad ku hadasho Soomaali, waaxda luqadaha, qaybta kaalmada adeegyada, waxay ryder luong. So [facility] [phone].    ATENCIÓN: Si habla español, tiene a rueda disposición servicios gratuitos de asistencia lingüística. Llame al [phone].    We comply with applicable federal civil rights laws and [place] laws. We do not discriminate on the basis of race, color, national origin, age, disability sex, sexual orientation or gender identity.             Thank you!     Thank you for choosing [facility]  for your care. Our goal is always to provide you with excellent care. Hearing back from our patients is one way we can continue to improve our services. Please take a few minutes to complete the written survey that you may receive in the mail after your visit with us. Thank you!             Your Updated Medication List - Protect others around you: Learn how to safely use, store and throw away your medicines at www.disposemymeds.org.          This list is accurate as of: 8/28/17 12:14 PM.  Always use your most recent med list.                   Brand Name Dispense Instructions for use Diagnosis    atorvastatin 20 MG tablet    LIPITOR    90 tablet    Take 1 tablet (20 mg) by mouth daily    Hyperlipidemia LDL goal <100       cetirizine 10 MG tablet    zyrTEC    30 tablet    Take 1 tablet (10 mg) by mouth every evening    Seasonal allergic rhinitis, unspecified chronicity, unspecified trigger       LORazepam 1 MG tablet    ATIVAN    20 tablet    Take 0.5 tablets (0.5 mg) by mouth every 8 hours as needed for anxiety    Anxiety       medroxyPROGESTERone 150 MG/ML injection    DEPO-PROVERA    3 mL    Inject 1 mL (150 mg) into the muscle every 3 months        omeprazole 20 MG tablet     60 tablet    Take 1 tablet (20 mg) by mouth 2 times daily Take 30-60 minutes before a meal.    Gastroesophageal reflux disease, esophagitis presence not specified       ondansetron 4 MG tablet    ZOFRAN    18 tablet    TAKE ONE TABLET BY MOUTH EVERY 6 HOURS AS NEEDED FOR NAUSEA OR VOMITING    Anxiety attack       order for DME     1 each    Blood pressure cuff    Elevated blood pressure reading without diagnosis of hypertension       predniSONE 20 MG tablet    DELTASONE    20 tablet    Take 3 tabs (60 mg) by mouth daily x 3 days, 2 tabs (40 mg) daily x 3 days, 1 tab (20 mg) daily x 3 days, then 1/2 tab (10 mg) x 3 days.    Contact dermatitis due to poison ivy       QUEtiapine  50 MG tablet    SEROQUEL    60 tablet    Take 1-2 tablets ( mg) by mouth nightly as needed    Anxiety, Psychophysiological insomnia       sertraline 100 MG tablet    ZOLOFT    90 tablet    Take 1 tablet (100 mg) by mouth daily    Anxiety       sucralfate 1 GM tablet    CARAFATE    120 tablet    Take 1 tablet (1 g) by mouth 4 times daily    Gastroesophageal reflux disease, esophagitis presence not specified

## 2017-08-28 NOTE — TELEPHONE ENCOUNTER
Poison ivy doesn't usually hurt much.  It's mainly severe itching.  If this is painful, then should check with provider who saw pt if this might be shingles or cellulitis.    Will route to provider seen today.

## 2017-08-28 NOTE — TELEPHONE ENCOUNTER
"Radha Beckwith is a 41 year old female who calls with a rash.  States poison ivy is \"really bad\" and is spreading up her face.      NURSING ASSESSMENT:   The rash began  1 day ago.   Patient's rash is located on arms bilateral, face and neck.  Rash is described as raised, with a color of pink/red on face with Serous drainage.  Rash is itching and spreading.  Associated symptoms: none  Patient thinks she had exposure to poison ivy.  Patient is not on any new medications.  Patient has not tried new soaps, detergents, perfumes or lotions.  Patients is allergic to medications as listed.  Other members of the household have not had symptoms.  Past medical history includes had poison ivy in the past.  Allergies:   Allergies   Allergen Reactions     Cafergot      12-            GI problems-     Compazine      Droperidol      Nubain [Nalbuphine Hcl]      Seasonal Allergies      Sumatriptan      vomits after giving herself a shot     Prochlorperazine Palpitations     Uncontrolled movement     Patient has tried Triamcinolone cream.  Patient informed of the following home remedies  none    NURSING PLAN: Nursing advice to patient see below    RECOMMENDED DISPOSITION:  Pt wanting appt/medication ASAP.  Pt scheduled for acute appt with Caro Tellez CNP at [facility].   Pt verbalizes understanding and agrees to plan.  Will comply with recommendation: Yes  If further questions/concerns or if symptoms do not improve, worsen or new symptoms develop, call your PCP or [facility] as soon as possible.    Guideline used:  Rash, Adult  Telephone Triage Protocols for Nurses, Fifth Edition, Jyoti Washington RN    "

## 2017-08-29 ENCOUNTER — OFFICE VISIT (OUTPATIENT)
Dept: FAMILY MEDICINE | Facility: CLINIC | Age: 42
End: 2017-08-29
Payer: COMMERCIAL

## 2017-08-29 VITALS
RESPIRATION RATE: 16 BRPM | BODY MASS INDEX: 24.99 KG/M2 | WEIGHT: 150 LBS | HEIGHT: 65 IN | OXYGEN SATURATION: 98 % | HEART RATE: 96 BPM | TEMPERATURE: 97.6 F | SYSTOLIC BLOOD PRESSURE: 118 MMHG | DIASTOLIC BLOOD PRESSURE: 76 MMHG

## 2017-08-29 DIAGNOSIS — R10.2 PELVIC PAIN IN FEMALE: ICD-10-CM

## 2017-08-29 DIAGNOSIS — N87.9 CERVICAL DYSPLASIA: ICD-10-CM

## 2017-08-29 DIAGNOSIS — R51.9 FACE PAIN: Primary | ICD-10-CM

## 2017-08-29 PROCEDURE — 99214 OFFICE O/P EST MOD 30 MIN: CPT | Performed by: OBSTETRICS & GYNECOLOGY

## 2017-08-29 RX ORDER — ACETAMINOPHEN AND CODEINE PHOSPHATE 300; 30 MG/1; MG/1
1-2 TABLET ORAL
Qty: 10 TABLET | Refills: 0 | Status: SHIPPED | OUTPATIENT
Start: 2017-08-29 | End: 2017-10-16

## 2017-08-29 ASSESSMENT — PAIN SCALES - GENERAL: PAINLEVEL: NO PAIN (0)

## 2017-08-29 NOTE — NURSING NOTE
"Chief Complaint   Patient presents with     RECHECK       Initial /76 (BP Location: Left arm, Patient Position: Chair, Cuff Size: Adult Regular)  Pulse 96  Temp 97.6  F (36.4  C) (Temporal)  Resp 16  Ht 5' 5\" (1.651 m)  Wt 150 lb (68 kg)  SpO2 98%  BMI 24.96 kg/m2 Estimated body mass index is 24.96 kg/(m^2) as calculated from the following:    Height as of this encounter: 5' 5\" (1.651 m).    Weight as of this encounter: 150 lb (68 kg)..   BP completed using cuff size: regular  Medication Rec Completed    Frida Navas CMA    "

## 2017-08-29 NOTE — MR AVS SNAPSHOT
After Visit Summary   8/29/2017    Radha Beckwith    MRN: [id number]           Patient Information     Date Of Birth          1975        Visit Information        Provider Department      8/29/2017 8:30 AM Michael Chandler MD [facility]        Today's Diagnoses     Face pain    -  1       Follow-ups after your visit        Your next 10 appointments already scheduled     Oct 16, 2017  8:30 AM CDT   Pre-Op physical with Clemente Chino MD   [facility] ([facility]    [address] [place] [id number]   [phone]            Oct 24, 2017  8:50 AM CDT   SHORT with Michael Chandler MD   [facility] ([facility])    [address] [place] [id number]   [phone]            Oct 29, 2017 11:45 AM CDT   LAB with [facility] PM[facility]   [facility] ([facility])    [address] [place] [id number]   [phone]           Patient must bring picture ID. Patient should be prepared to give a urine specimen  Please do not eat 10-12 hours before your appointment if you are coming in fasting for labs on lipids, cholesterol, or glucose (sugar). Pregnant women should follow their Care Team instructions. Water with medications is okay. Do not drink coffee or other fluids. If you have concerns about taking  your medications, please ask at office or if scheduling via Mswipe Technologieshart, send a message by clicking on Secure Messaging, Message Your Care Team.            Oct 30, 2017   Procedure with Michael Chandler MD   [facility] ([facility])    [address] [place] [address]  [place] [address]   [phone]           From [address]: Exit at [address] on south side of [place]. Turn right on [address]. Turn left at stoplight on [address]. [facility] will be in view two blocks ahead            Nov  "2017 10:40 AM CDT   SHORT with Michael Chandler MD   [facility]    [address] [id number] [phone]              Who to contact     If you have questions or need follow up information about today's clinic visit or your schedule please contact [facility] directly at [phone].  Normal or non-critical lab and imaging results will be communicated to you by iGuidershart, letter or phone within 4 business days after the clinic has received the results. If you do not hear from us within 7 days, please contact the clinic through iGuidershart or phone. If you have a critical or abnormal lab result, we will notify you by phone as soon as possible.  Submit refill requests through CrownPeak or call your pharmacy and they will forward the refill request to us. Please allow 3 business days for your refill to be completed.          Additional Information About Your Visit        CrownPeak Information     CrownPeak lets you send messages to your doctor, view your test results, renew your prescriptions, schedule appointments and more. To sign up, go to [email or web address] . Click on \"Log in\" on the left side of the screen, which will take you to the Welcome page. Then click on \"Sign up Now\" on the right side of the page.     You will be asked to enter the access code listed below, as well as some personal information. Please follow the directions to create your username and password.     Your access code is: [email or web address]  Expires: 2017  2:47 PM     Your access code will  in 90 days. If you need help or a new code, please call your [facility] or [phone].        Care EveryWhere ID     This is your Care EveryWhere ID. This could be used by other organizations to access your [facility] medical records  [id number]        Your Vitals Were     Pulse Temperature Respirations Height Pulse Oximetry BMI (Body Mass Index)    96 97.6 " " F (36.4  C) (Temporal) 16 5' 5\" (1.651 m) 98% 24.96 kg/m2       Blood Pressure from Last 3 Encounters:   08/29/17 118/76   08/28/17 104/76   08/16/17 102/74    Weight from Last 3 Encounters:   08/29/17 150 lb (68 kg)   08/28/17 152 lb (68.9 kg)   08/16/17 150 lb (68 kg)              Today, you had the following     No orders found for display         Today's Medication Changes          These changes are accurate as of: 8/29/17  8:59 AM.  If you have any questions, ask your nurse or doctor.               Start taking these medicines.        Dose/Directions    acetaminophen-codeine 300-30 MG per tablet   Commonly known as:  TYLENOL w/CODEINE No. 3   Used for:  Face pain   Started by:  Michael Chandler MD        Dose:  1-2 tablet   Take 1-2 tablets by mouth nightly as needed for mild pain   Quantity:  10 tablet   Refills:  0            Where to get your medicines      Some of these will need a paper prescription and others can be bought over the counter.  Ask your nurse if you have questions.     Bring a paper prescription for each of these medications     acetaminophen-codeine 300-30 MG per tablet                Primary Care Provider Office Phone #    [facility] [phone]       No address on file        Equal Access to Services     DIONNE GALLARDO AH: Hadii quintin gayo Soomaali, waaxda luqadaha, qaybta kaalmada adeegyada, yadi luong. So [facility] [phone].    ATENCIÓN: Si habla español, tiene a rueda disposición servicios gratuitos de asistencia lingüística. Llame al [phone].    We comply with applicable federal civil rights laws and [place] laws. We do not discriminate on the basis of race, color, national origin, age, disability sex, sexual orientation or gender identity.            Thank you!     Thank you for choosing [facility]  for your care. Our goal is always to provide you with excellent care. Hearing back from our patients is one " way we can continue to improve our services. Please take a few minutes to complete the written survey that you may receive in the mail after your visit with us. Thank you!             Your Updated Medication List - Protect others around you: Learn how to safely use, store and throw away your medicines at www.disposemymeds.org.          This list is accurate as of: 8/29/17  8:59 AM.  Always use your most recent med list.                   Brand Name Dispense Instructions for use Diagnosis    acetaminophen-codeine 300-30 MG per tablet    TYLENOL w/CODEINE No. 3    10 tablet    Take 1-2 tablets by mouth nightly as needed for mild pain    Face pain       atorvastatin 20 MG tablet    LIPITOR    90 tablet    Take 1 tablet (20 mg) by mouth daily    Hyperlipidemia LDL goal <100       cetirizine 10 MG tablet    zyrTEC    30 tablet    Take 1 tablet (10 mg) by mouth every evening    Seasonal allergic rhinitis, unspecified chronicity, unspecified trigger       LORazepam 1 MG tablet    ATIVAN    20 tablet    Take 0.5 tablets (0.5 mg) by mouth every 8 hours as needed for anxiety    Anxiety       medroxyPROGESTERone 150 MG/ML injection    DEPO-PROVERA    3 mL    Inject 1 mL (150 mg) into the muscle every 3 months        omeprazole 20 MG tablet     60 tablet    Take 1 tablet (20 mg) by mouth 2 times daily Take 30-60 minutes before a meal.    Gastroesophageal reflux disease, esophagitis presence not specified       ondansetron 4 MG tablet    ZOFRAN    18 tablet    TAKE ONE TABLET BY MOUTH EVERY 6 HOURS AS NEEDED FOR NAUSEA OR VOMITING    Anxiety attack       order for DME     1 each    Blood pressure cuff    Elevated blood pressure reading without diagnosis of hypertension       predniSONE 20 MG tablet    DELTASONE    20 tablet    Take 3 tabs (60 mg) by mouth daily x 3 days, 2 tabs (40 mg) daily x 3 days, 1 tab (20 mg) daily x 3 days, then 1/2 tab (10 mg) x 3 days.    Contact dermatitis due to poison ivy       QUEtiapine 50 MG  tablet    SEROQUEL    60 tablet    Take 1-2 tablets ( mg) by mouth nightly as needed    Anxiety, Psychophysiological insomnia       sertraline 100 MG tablet    ZOLOFT    90 tablet    Take 1 tablet (100 mg) by mouth daily    Anxiety       sucralfate 1 GM tablet    CARAFATE    120 tablet    Take 1 tablet (1 g) by mouth 4 times daily    Gastroesophageal reflux disease, esophagitis presence not specified

## 2017-08-29 NOTE — PROGRESS NOTES
Subjective: she is here to discuss her pelvic pain and abnormal pap/cervical dysplasia. She is strongly requesting a hysterectomy because she doesn't tolerate pelvic exams at all- it hurts too much- we had to do the last exam under anesthesia.     Incidentally, she devloped poison ivy and her face is hurting- she was prescribed prednisone but the facial pain from the rash is keeping her up at night- she is requesting some T#3 for the nights only- she has tolerated codeine before and has not had any reactions to it- despite her nubain allergy- and she declines narcan rx to use with it.      The past medical history, social history, past surgical history and family history as shown below have been reviewed by me today.  Past Medical History:   Diagnosis Date     Abdominal pain, right lower quadrant 03/09/08    Admit. Discharged 03/10/08     Anxiety attack 7/31/2015     Dehydration      Gastric ulcer 7/31/2015     GERD (gastroesophageal reflux disease) 7/28/2010    Takes omeprazole and metoclopramide      Opioid dependence in remission (H) 8/2/2015     Other and unspecified ovarian cyst      Papanicolaou smear of cervix with low grade squamous intraepithelial lesion (LGSIL) 07/07/2017        Allergies   Allergen Reactions     Cafergot      12-            GI problems-     Compazine      Droperidol      Nubain [Nalbuphine Hcl]      Seasonal Allergies      Sumatriptan      vomits after giving herself a shot     Prochlorperazine Palpitations     Uncontrolled movement     Current Outpatient Prescriptions   Medication Sig Dispense Refill     predniSONE (DELTASONE) 20 MG tablet Take 3 tabs (60 mg) by mouth daily x 3 days, 2 tabs (40 mg) daily x 3 days, 1 tab (20 mg) daily x 3 days, then 1/2 tab (10 mg) x 3 days. 20 tablet 0     LORazepam (ATIVAN) 1 MG tablet Take 0.5 tablets (0.5 mg) by mouth every 8 hours as needed for anxiety 20 tablet 0     atorvastatin (LIPITOR) 20 MG tablet Take 1 tablet (20 mg) by mouth daily 90  tablet 1     cetirizine (ZYRTEC) 10 MG tablet Take 1 tablet (10 mg) by mouth every evening 30 tablet 3     medroxyPROGESTERone (DEPO-PROVERA) 150 MG/ML injection Inject 1 mL (150 mg) into the muscle every 3 months 3 mL 3     sertraline (ZOLOFT) 100 MG tablet Take 1 tablet (100 mg) by mouth daily 90 tablet 1     order for DME Blood pressure cuff 1 each 0     omeprazole 20 MG tablet Take 1 tablet (20 mg) by mouth 2 times daily Take 30-60 minutes before a meal. 60 tablet 3     ondansetron (ZOFRAN) 4 MG tablet TAKE ONE TABLET BY MOUTH EVERY 6 HOURS AS NEEDED FOR NAUSEA OR VOMITING 18 tablet 10     sucralfate (CARAFATE) 1 GM tablet Take 1 tablet (1 g) by mouth 4 times daily 120 tablet 1     QUEtiapine (SEROQUEL) 50 MG tablet Take 1-2 tablets ( mg) by mouth nightly as needed 60 tablet 3     Past Surgical History:   Procedure Laterality Date     BIOPSY CERVICAL, LOCAL EXCISION, SINGLE/MULTIPLE N/A 8/10/2017    Procedure: BIOPSY CERVICAL, LOCAL EXCISION, SINGLE/MULTIPLE;;  Surgeon: Michael Chandler MD;  Location[facility]     COLPOSCOPY, BIOPSY, COMBINED N/A 8/10/2017    Procedure: COMBINED COLPOSCOPY, BIOPSY;  Colposcopy with Cervical Biopsies and Endometrial Biopsy, Exam with Ultrasound;  Surgeon: Michael Chandler MD;  Location: [facility]     ESOPHAGOSCOPY, GASTROSCOPY, DUODENOSCOPY (EGD), COMBINED N/A 4/17/2017    Procedure: COMBINED ESOPHAGOSCOPY, GASTROSCOPY, DUODENOSCOPY (EGD);  Surgeon: Ibrahima Esposito MD;  Location: [facility]     EXAM UNDER ANESTHESIA PELVIC N/A 8/10/2017    Procedure: EXAM UNDER ANESTHESIA PELVIC;;  Surgeon: Michael Chandler MD;  Location: [facility]     [facility] UGI ENDOSCOPY, SIMPLE EXAM  01/07/08     Social History     Social History     Marital status: Single     Spouse name: N/A     Number of children: N/A     Years of education: N/A     Social History Main Topics     Smoking status: Current Every Day Smoker     Packs/day: 0.25     Years: 20.00     Types: Cigarettes      "Smokeless tobacco: Never Used      Comment: 5-6 cigs daily     Alcohol use Yes      Comment: occasional drinks     Drug use: No     Sexual activity: No     Other Topics Concern     Parent/Sibling W/ Cabg, Mi Or Angioplasty Before 65f 55m? No     Social History Narrative     Family History   Problem Relation Age of Onset     Depression Mother      Respiratory Mother      Chronic Obstructive Pulmonary Disease Mother      CEREBROVASCULAR DISEASE Father      Brain anyeurism     Adrenal Disorder Other      Chronic Obstructive Pulmonary Disease Other        ROS: A 12 point review of systems was done. Except for what is listed above in the HPI, the systems review is negative .      Objective: Vital signs: Blood pressure 118/76, pulse 96, temperature 97.6  F (36.4  C), temperature source Temporal, resp. rate 16, height 5' 5\" (1.651 m), weight 150 lb (68 kg), SpO2 98 %, not currently breastfeeding.    The exam of the face reveals a rash consistent with poison ivy on the chin and cheeks-  The throat is clear and doesn't appear inflamed.    No other exam is done today.         Assessment/Plan:A total of 25 minutes were spent face-to-face with this patient during today's consultation, with more than 50% of that time devoted to conversation and counseling about the management decisions.      1. Chronic pelvic pain- I suspect it is sever dysmenorrhea- possibly adenomyosis-   She is requesting hysterectomy for this-I believe this makes sense because she cannot tolerate pelvic exams and will require them for frequent pap exams and other evaluation for her chronic pain.    2. LSIL pap and HR HPV and recent cervical biopsy showing atypia consistent with mild dysplasia.- her request is hysterectomy rather than undergo frequent repeat pap and colposcopy exams.    3. Her face hurts from poison ivy- see rx for T#3- just for night use- I offered her the narcan nasal spray but she delcined- she has handled this well in the past. The " potential side effects and risks of the medication were thoroughly discussed with the patient.      4. We will meet again in October if her insurance approves the hysterectomy request.   Recheck sooner if symptoms worsen. If not approved, she needs to repeat pap in 6 months.    RJ Chandler MD

## 2017-08-30 ENCOUNTER — TELEPHONE (OUTPATIENT)
Dept: FAMILY MEDICINE | Facility: OTHER | Age: 42
End: 2017-08-30

## 2017-08-30 NOTE — TELEPHONE ENCOUNTER
Insurance max #120 in 365 days.  Consider a prior auth.    [facility]  [phone]  ID [id number]    Thanks.

## 2017-08-30 NOTE — LETTER
[facility]  [id number] [place] [id number] [phone]        August 31, 2017    Radha Beckwith  [address]  [facility] [address]              Dear Radha Beckwith        We are writing to inform you that your insurance through [facility] will only cover 120 days worth of medication for a 365 day period.  omeprazole is also available over the counter.   Please feel free to contact your insurance company by calling the number that is on the back of your insurance card.     Thank you,     Your [facility] Team          Sincerely,        Clemente Chino MD

## 2017-09-11 ENCOUNTER — TELEPHONE (OUTPATIENT)
Dept: FAMILY MEDICINE | Facility: OTHER | Age: 42
End: 2017-09-11

## 2017-09-11 DIAGNOSIS — L23.7 CONTACT DERMATITIS DUE TO POISON IVY: ICD-10-CM

## 2017-09-11 NOTE — TELEPHONE ENCOUNTER
Reason for Call:  Medication or medication refill:    Do you use a [facility]?  Name of the pharmacy and phone number for the current request:  [facility] - [phone]    Name of the medication requested: Prednisone 20 mg    Other request: patient states she was told by Caro that if she was still bothered by it to call back and she would call in another prescription    Can we leave a detailed message on this number? YES    Phone number patient can be reached at: Cell number on file:    Telephone Information:   Mobile [phone]       Best Time:   Call taken on 9/11/2017 at 4:53 PM by Nena Staples

## 2017-09-12 RX ORDER — PREDNISONE 20 MG/1
TABLET ORAL
Qty: 20 TABLET | Refills: 0 | Status: SHIPPED | OUTPATIENT
Start: 2017-09-12 | End: 2017-10-16

## 2017-09-12 NOTE — TELEPHONE ENCOUNTER
Patient notified of below by VM, authorization in chart to leave message.  Mirela Schulz CMA ([facility])

## 2017-09-28 ENCOUNTER — TELEPHONE (OUTPATIENT)
Dept: FAMILY MEDICINE | Facility: OTHER | Age: 42
End: 2017-09-28

## 2017-09-28 ENCOUNTER — ALLIED HEALTH/NURSE VISIT (OUTPATIENT)
Dept: FAMILY MEDICINE | Facility: OTHER | Age: 42
End: 2017-09-28
Payer: COMMERCIAL

## 2017-09-28 VITALS — WEIGHT: 154.3 LBS | BODY MASS INDEX: 25.68 KG/M2

## 2017-09-28 DIAGNOSIS — Z30.42 DEPO-PROVERA CONTRACEPTIVE STATUS: Primary | ICD-10-CM

## 2017-09-28 DIAGNOSIS — F41.9 ANXIETY: ICD-10-CM

## 2017-09-28 PROCEDURE — 96372 THER/PROPH/DIAG INJ SC/IM: CPT

## 2017-09-28 PROCEDURE — 99207 ZZC NO CHARGE NURSE ONLY: CPT

## 2017-09-28 NOTE — NURSING NOTE
BP: 152/84    LAST PAP/EXAM:   Lab Results   Component Value Date    PAP LSIL 07/07/2017     URINE HCG:not indicated    The following medication was given:     MEDICATION: Depo Provera 150mg  ROUTE: IM  SITE: Deltoid - Left  : [employer]  LOT #: [id number]   EXP:02/2020  NEXT INJECTION DUE: 12/14/17 - 12/28/17   Provider: Mirian Castro    Prior to injection verified patient identity using patient's name and date of birth.    Per orders of Dr. Mirian castro, injection of Depo-provera given by Doretha Hernandez. Patient instructed to remain in clinic for 15 minutes afterwards, and to report any adverse reaction to me immediately.      Doretha Hernandez RN

## 2017-09-28 NOTE — MR AVS SNAPSHOT
After Visit Summary   9/28/2017    Radha Beckwith    MRN: [id number]           Patient Information     Date Of Birth          1975        Visit Information        Provider Department      9/28/2017 3:00 PM NL FLOAT NURSE [facility]        Today's Diagnoses     Depo-Provera contraceptive status    -  1       Follow-ups after your visit        Your next 10 appointments already scheduled     Oct 16, 2017  8:30 AM CDT   [facility]    [address] [place] [address]   [phone]            Oct 24, 2017  8:50 AM CDT   SHORT with Michael Chandler MD   [facility]    [address] [place] [address]   [phone]            Oct 29, 2017 11:45 AM CDT   LAB with NL LAB [facility] ([facility])    [address] [place] [address]   [phone]           Patient must bring picture ID. Patient should be prepared to give a urine specimen  Please do not eat 10-12 hours before your appointment if you are coming in fasting for labs on lipids, cholesterol, or glucose (sugar). Pregnant women should follow their Care Team instructions. Water with medications is okay. Do not drink coffee or other fluids. If you have concerns about taking  your medications, please ask at office or if scheduling via Film Freshhart, send a message by clicking on Secure Messaging, Message Your Care Team.            Oct 30, 2017   Procedure with Michael Chandler MD   [facility] ([facility])    [address] [place] [address]  [place] [address]   [phone]           From [address]: Exit at [employer] [address] on south side of [place]. Turn right on [address]. Turn left at stoplight on [address]. [facility] will be in view two blocks ahead     "        2017 10:40 AM CDT   SHORT with Michael Chandler MD   [facility]    [address] [address] [phone]              Who to contact     If you have questions or need follow up information about today's clinic visit or your schedule please contact [facility] directly at [phone].  Normal or non-critical lab and imaging results will be communicated to you by Xubahart, letter or phone within 4 business days after the clinic has received the results. If you do not hear from us within 7 days, please contact the clinic through Kwaabt or phone. If you have a critical or abnormal lab result, we will notify you by phone as soon as possible.  Submit refill requests through Appsperse or call your pharmacy and they will forward the refill request to us. Please allow 3 business days for your refill to be completed.          Additional Information About Your Visit        Appsperse Information     Appsperse lets you send messages to your doctor, view your test results, renew your prescriptions, schedule appointments and more. To sign up, go to [email or web address] . Click on \"Log in\" on the left side of the screen, which will take you to the Welcome page. Then click on \"Sign up Now\" on the right side of the page.     You will be asked to enter the access code listed below, as well as some personal information. Please follow the directions to create your username and password.     Your access code is: [email or web address]  Expires: 2017  3:08 PM     Your access code will  in 90 days. If you need help or a new code, please call your [facility] or [phone].        Care EveryWhere ID     This is your Care EveryWhere ID. This could be used by other organizations to access your [facility] medical records  [id number]        Your Vitals Were     BMI (Body Mass Index)                   25.68 kg/m2            Blood " Pressure from Last 3 Encounters:   08/29/17 118/76   08/28/17 104/76   08/16/17 102/74    Weight from Last 3 Encounters:   09/28/17 154 lb 4.8 oz (70 kg)   08/29/17 150 lb (68 kg)   08/28/17 152 lb (68.9 kg)              We Performed the Following     INJECTION INTRAMUSCULAR OR SUB-Q     Medroxyprogesterone inj/1mg (Depo Provera J-Code)        Primary Care Provider Office Phone # Fax #    [facility] [phone] [address] [facility] [address]        Equal Access to Services     JAELYN GALLARDO : Hadii quintin Holt, wabrandon arce, luan kaalmada porfirio, yadi luong. So [facility] [phone].    ATENCIÓN: Si habla español, tiene a rueda disposición servicios gratuitos de asistencia lingüística. [facility] [phone].    We comply with applicable federal civil rights laws and [place] laws. We do not discriminate on the basis of race, color, national origin, age, disability sex, sexual orientation or gender identity.            Thank you!     Thank you for choosing [facility]  for your care. Our goal is always to provide you with excellent care. Hearing back from our patients is one way we can continue to improve our services. Please take a few minutes to complete the written survey that you may receive in the mail after your visit with us. Thank you!             Your Updated Medication List - Protect others around you: Learn how to safely use, store and throw away your medicines at www.disposemymeds.org.          This list is accurate as of: 9/28/17  3:08 PM.  Always use your most recent med list.                   Brand Name Dispense Instructions for use Diagnosis    acetaminophen-codeine 300-30 MG per tablet    TYLENOL w/CODEINE No. 3    10 tablet    Take 1-2 tablets by mouth nightly as needed for mild pain    Face pain       atorvastatin 20 MG tablet    LIPITOR    90 tablet    Take 1 tablet (20 mg) by mouth daily     Hyperlipidemia LDL goal <100       cetirizine 10 MG tablet    zyrTEC    30 tablet    Take 1 tablet (10 mg) by mouth every evening    Seasonal allergic rhinitis, unspecified chronicity, unspecified trigger       LORazepam 1 MG tablet    ATIVAN    20 tablet    Take 0.5 tablets (0.5 mg) by mouth every 8 hours as needed for anxiety    Anxiety       medroxyPROGESTERone 150 MG/ML injection    DEPO-PROVERA    3 mL    Inject 1 mL (150 mg) into the muscle every 3 months        omeprazole 20 MG tablet     60 tablet    Take 1 tablet (20 mg) by mouth 2 times daily Take 30-60 minutes before a meal.    Gastroesophageal reflux disease, esophagitis presence not specified       ondansetron 4 MG tablet    ZOFRAN    18 tablet    TAKE ONE TABLET BY MOUTH EVERY 6 HOURS AS NEEDED FOR NAUSEA OR VOMITING    Anxiety attack       order for DME     1 each    Blood pressure cuff    Elevated blood pressure reading without diagnosis of hypertension       predniSONE 20 MG tablet    DELTASONE    20 tablet    Take 3 tabs (60 mg) by mouth daily x 3 days, 2 tabs (40 mg) daily x 3 days, 1 tab (20 mg) daily x 3 days, then 1/2 tab (10 mg) x 3 days.    Contact dermatitis due to poison ivy       QUEtiapine 50 MG tablet    SEROQUEL    60 tablet    Take 1-2 tablets ( mg) by mouth nightly as needed    Anxiety, Psychophysiological insomnia       sertraline 100 MG tablet    ZOLOFT    90 tablet    Take 1 tablet (100 mg) by mouth daily    Anxiety       sucralfate 1 GM tablet    CARAFATE    120 tablet    Take 1 tablet (1 g) by mouth 4 times daily    Gastroesophageal reflux disease, esophagitis presence not specified

## 2017-09-28 NOTE — TELEPHONE ENCOUNTER
ativan      Last Written Prescription Date: 8/28/17  Last Fill Quantity: 20,  # refills: 0   Last Office Visit with [facility], [facility] or  Health prescribing provider: 8/29/17                                         Next 5 appointments (look out 90 days)     Sep 28, 2017  3:00 PM CDT   Nurse Only with NL FLOAT NURSE [facility] ([facility]    [address] [facility] [address]   [phone]            Oct 16, 2017  8:30 AM CDT   Pre-Op physical with Clemente [facility] MD   [facility]    [address] [facility] [address]   [phone]            Oct 24, 2017  8:50 AM CDT   SHORT [facility]   [facility]    [address] [place] [address]   [phone]            Nov 03, 2017 10:40 AM CDT   SHORT with [facility] MD   [facility]    [address] [place] [address]   [phone]

## 2017-09-28 NOTE — TELEPHONE ENCOUNTER
Reason for Call:  Medication or medication refill:    [facility]  Name of the pharmacy and phone number for the current request:  [facility] - [phone]    Name of the medication requested: LORazepam (ATIVAN) 1 MG tablet    Other request: patient has an appointment with Dr Chino on 10/16/2017 but she is out of medication patient would like enough medication to get her through to her appointment. Please call patient and let her know if this can be done.    Can we leave a detailed message on this number? YES    Phone number patient can be reached at: Home number on file [phone] (home) or Cell number on file:    Telephone Information:   Mobile [phone]       Best Time: anytime    Call taken on 9/28/2017 at 2:44 PM by Christie Amanda

## 2017-10-02 RX ORDER — LORAZEPAM 1 MG/1
0.5 TABLET ORAL EVERY 8 HOURS PRN
Qty: 20 TABLET | Refills: 0 | Status: SHIPPED | OUTPATIENT
Start: 2017-10-02 | End: 2017-10-16

## 2017-10-02 NOTE — TELEPHONE ENCOUNTER
Rx for Ativan has been walked to [facility] pharmacy  Closing encounter  Jahaira Rivers RT (R)

## 2017-10-09 DIAGNOSIS — F51.04 PSYCHOPHYSIOLOGICAL INSOMNIA: ICD-10-CM

## 2017-10-09 DIAGNOSIS — F41.9 ANXIETY: ICD-10-CM

## 2017-10-10 NOTE — TELEPHONE ENCOUNTER
QUEtiapine (SEROQUEL) 50 MG tablet     Last Written Prescription Date: 03/30/17  Last Fill Quantity: 60, # refills: 1  Last Office Visit with [facility], [facility] or [facility] prescribing provider: 08/29/17  Next 5 appointments (look out 90 days)     Oct 16, 2017  8:30 AM CDT   Pre-Op physical with Clemente [facility] MD   [facility]    [address] [place] [address]   [phone]            Oct 24, 2017  8:50 AM CDT   SHORT with Michael Chandler MD   [facility]    [address] [facility] [address] [phone]            Nov 02, 2017 11:10 AM CDT   SHORT with Michael Chandler MD   [facility]    [address] [id number] [phone]                   BP Readings from Last 3 Encounters:   08/29/17 118/76   08/28/17 104/76   08/16/17 102/74     Pulse Readings from Last 2 Encounters:   08/29/17 96   08/28/17 117     Lab Results   Component Value Date    GLC 99 08/10/2017     Lab Results   Component Value Date    WBC 6.8 03/24/2017     Lab Results   Component Value Date    RBC 4.51 03/24/2017     Lab Results   Component Value Date    HGB 14.5 03/24/2017     Lab Results   Component Value Date    HCT 42.9 03/24/2017     No components found for: MCT  Lab Results   Component Value Date    MCV 95 03/24/2017     Lab Results   Component Value Date    MCH 32.2 03/24/2017     Lab Results   Component Value Date    MCHC 33.8 03/24/2017     Lab Results   Component Value Date    RDW 12.7 03/24/2017     Lab Results   Component Value Date     03/24/2017     Lab Results   Component Value Date    CHOL 287 08/10/2017     Lab Results   Component Value Date    HDL 30 08/10/2017     Lab Results   Component Value Date    LDL  08/10/2017     Cannot estimate LDL when triglyceride exceeds 400 mg/dL     08/10/2017     Lab Results   Component  Value Date    TRIG 608 08/10/2017     No results found for: BOLIVAR

## 2017-10-11 NOTE — PROGRESS NOTES
[facility]  [address] [facility] [address]  [phone]  Dept: [phone]    PRE-OP EVALUATION:  Today's date: 10/16/2017    Radha Beckwith (: 1975) presents for pre-operative evaluation assessment as requested by Dr. Chandler.  She requires evaluation and anesthesia risk assessment prior to undergoing surgery/procedure for treatment of abnormal uterine bleeding and painful periods, cervical dysplasia.  Proposed procedure: Laparoscopic hysterectomy    Date of Surgery/ Procedure: 10/30/17  Time of Surgery/ Procedure: 9:00 am  Hospital/Surgical Facility: [facility]  Fax number for surgical facility:   Primary Physician: Kassandra Bergmanman  Type of Anesthesia Anticipated: to be determined    Patient has a Health Care Directive or Living Will:  NO    Preop Questions 10/16/2017   1.  Do you have a history of heart attack, stroke, stent, bypass or surgery on an artery in the head, neck, heart or legs? No   2.  Do you ever have any pain or discomfort in your chest? No   3.  Do you have a history of  Heart Failure? No   4.   Are you troubled by shortness of breath when:  walking on a level surface, or up a slight hill, or at night? No   5.  Do you currently have a cold, bronchitis or other respiratory infection? No   6.  Do you have a cough, shortness of breath, or wheezing? No   7.  Do you sometimes get pains in the calves of your legs when you walk? No   8. Do you or anyone in your family have previous history of blood clots? No   9.  Do you or does anyone in your family have a serious bleeding problem such as prolonged bleeding following surgeries or cuts? No   10. Have you ever had problems with anemia or been told to take iron pills? No   11. Have you had any abnormal blood loss such as black, tarry or bloody stools, or abnormal vaginal bleeding? No   12. Have you ever had a blood transfusion? No   13. Have you or any of your relatives ever had problems with  anesthesia? No   14. Do you have sleep apnea, excessive snoring or daytime drowsiness? No   15. Do you have any prosthetic heart valves? No   16. Do you have prosthetic joints? No   17. Is there any chance that you may be pregnant? No           HPI:                                                      Brief HPI related to upcoming procedure: Heavy periods and painful cramping since puberty. Was managed well with Depo shot for over 10 years, but does not want to remain on depo shot anymore. Still have painful cramping currently.       See problem list for active medical problems.  Problems all longstanding and stable, except as noted/documented.  See ROS for pertinent symptoms related to these conditions.                                                                                                  .  DEPRESSION - Patient has a long history of Depression of moderate severity requiring medication for control with recent symptoms being gradually worsening..Current symptoms of depression include depressed mood, diminished interest or pleasure in activities, sleep disturbance, difficulty falling asleep.                                                                                                                                                                                    .    MEDICAL HISTORY:                                                    Patient Active Problem List    Diagnosis Date Noted     Papanicolaou smear of cervix with low grade squamous intraepithelial lesion (LGSIL) 07/07/2017     Priority: Medium     7/7/17 LSIL pap/+ HR HPV (not 16 or 18). Plan: colp bef 10/7/17.        Opioid dependence in remission (H) 08/02/2015     Priority: Medium     On suboxone treatement with Garland Rodriguez MD, [place].  (Noted in 2015).  Has been off suboxone since at least June of 2017. 8/9/2017        Anxiety attack 07/31/2015     Priority: Medium     Hypokalemia 06/23/2015     Priority: Medium     Atypical chest pain  06/23/2015     Priority: Medium     Tobacco abuse 06/23/2015     Priority: Medium     Hyperlipidemia LDL goal <100 01/29/2014     Priority: Medium     Ingrowing nail 01/09/2014     Priority: Medium     Anxiety 08/19/2013     Priority: Medium     GERD (gastroesophageal reflux disease) 07/28/2010     Priority: Medium     Takes omeprazole and metoclopramide       Restless legs 07/28/2010     Priority: Medium      Past Medical History:   Diagnosis Date     Abdominal pain, right lower quadrant 03/09/08    Admit. Discharged 03/10/08     Anxiety attack 7/31/2015     Dehydration      Gastric ulcer 7/31/2015     GERD (gastroesophageal reflux disease) 7/28/2010    Takes omeprazole and metoclopramide      Opioid dependence in remission (H) 8/2/2015     Other and unspecified ovarian cyst      Papanicolaou smear of cervix with low grade squamous intraepithelial lesion (LGSIL) 07/07/2017     Past Surgical History:   Procedure Laterality Date     BIOPSY CERVICAL, LOCAL EXCISION, SINGLE/MULTIPLE N/A 8/10/2017    Procedure: BIOPSY CERVICAL, LOCAL EXCISION, SINGLE/MULTIPLE;;  Surgeon: Michael Chandler MD;  Location: [facility] OR     COLPOSCOPY, BIOPSY, COMBINED N/A 8/10/2017    Procedure: COMBINED COLPOSCOPY, BIOPSY;  Colposcopy with Cervical Biopsies and Endometrial Biopsy, Exam with Ultrasound;  Surgeon: Michael Chandler MD;  Location: [facility] OR     ESOPHAGOSCOPY, GASTROSCOPY, DUODENOSCOPY (EGD), COMBINED N/A 4/17/2017    Procedure: COMBINED ESOPHAGOSCOPY, GASTROSCOPY, DUODENOSCOPY (EGD);  Surgeon: Ibrahima Esposito MD;  Location: [facility] GI     EXAM UNDER ANESTHESIA PELVIC N/A 8/10/2017    Procedure: EXAM UNDER ANESTHESIA PELVIC;;  Surgeon: Michael Chandler MD;  Location: [facility] OR     [facility] UGI ENDOSCOPY, SIMPLE EXAM  01/07/08     Current Outpatient Prescriptions   Medication Sig Dispense Refill     sertraline (ZOLOFT) 100 MG tablet Take 1.5 tablets (150 mg) by mouth daily 135 tablet 1     propranolol (INDERAL) 20 MG  tablet Take 1 tablet (20 mg) by mouth 2 times daily 180 tablet 1     QUEtiapine (SEROQUEL) 50 MG tablet TAKE ONE OR TWO TABLETS BY MOUTH NIGHTLY AS NEEDED 60 tablet 3     ondansetron (ZOFRAN) 4 MG tablet TAKE ONE TABLET BY MOUTH EVERY 6 HOURS AS NEEDED FOR NAUSEA OR VOMITING 18 tablet 10     LORazepam (ATIVAN) 1 MG tablet Take 0.5 tablets (0.5 mg) by mouth every 8 hours as needed for anxiety 20 tablet 1     atorvastatin (LIPITOR) 20 MG tablet Take 1 tablet (20 mg) by mouth daily 90 tablet 1     cetirizine (ZYRTEC) 10 MG tablet Take 1 tablet (10 mg) by mouth every evening (Patient not taking: Reported on 10/24/2017) 30 tablet 3     medroxyPROGESTERone (DEPO-PROVERA) 150 MG/ML injection Inject 1 mL (150 mg) into the muscle every 3 months 3 mL 3     order for DME Blood pressure cuff 1 each 0     omeprazole 20 MG tablet Take 1 tablet (20 mg) by mouth 2 times daily Take 30-60 minutes before a meal. 60 tablet 3     sucralfate (CARAFATE) 1 GM tablet Take 1 tablet (1 g) by mouth 4 times daily 120 tablet 1     OTC products: tylenol as needed     Allergies   Allergen Reactions     Cafergot      12-            GI problems-     Compazine      Droperidol      Nubain [Nalbuphine Hcl]      Seasonal Allergies      Sumatriptan      vomits after giving herself a shot     Prochlorperazine Palpitations     Uncontrolled movement      Latex Allergy: NO    Social History   Substance Use Topics     Smoking status: Current Every Day Smoker     Packs/day: 0.25     Years: 20.00     Types: Cigarettes     Smokeless tobacco: Never Used      Comment: 5-6 cigs daily     Alcohol use Yes      Comment: occasional drinks     History   Drug Use No       REVIEW OF SYSTEMS:                                                    C: NEGATIVE for fever, chills, change in weight. Endorses headache for last three days, taking tylenol with no relief. Hx of migraines.   I: NEGATIVE for worrisome rashes, moles or lesions  E: NEGATIVE for vision changes or  "irritation  E/M: NEGATIVE for ear, mouth and throat problems  R: NEGATIVE for significant cough or SOB  B: NEGATIVE for masses, tenderness or discharge  CV: NEGATIVE for chest pain, palpitations or peripheral edema  GI: NEGATIVE for nausea, abdominal pain, heartburn, or change in bowel habits  : NEGATIVE for frequency, dysuria, or hematuria  M: NEGATIVE for significant arthralgias or myalgia  N: NEGATIVE for weakness, dizziness or paresthesias  E: NEGATIVE for temperature intolerance, skin/hair changes  H: NEGATIVE for bleeding problems  PSYCHIATRIC: POSITIVE for depressed mood, HX anxiety and HX depression    EXAM:                                                    /84 (BP Location: Left arm, Patient Position: Chair, Cuff Size: Adult Regular)  Pulse 112  Temp 98.2  F (36.8  C) (Temporal)  Resp 12  Ht 5' 5\" (1.651 m)  Wt 154 lb 12.8 oz (70.2 kg)  BMI 25.76 kg/m2    GENERAL APPEARANCE: healthy, alert and no distress     EYES: EOMI, PERRL     HENT: ear canals and TM's normal and nose and mouth without ulcers or lesions     NECK: no adenopathy, no asymmetry, masses, or scars and thyroid normal to palpation     RESP: lungs clear to auscultation - no rales, rhonchi or wheezes     CV: tachycardic, regular rhythm, normal S1 S2, no S3 or S4 and no murmur, click or rub     ABDOMEN:  soft, nontender, no HSM or masses and bowel sounds normal     MS: extremities normal- no gross deformities noted, no evidence of inflammation in joints, FROM in all extremities.     SKIN: no suspicious lesions or rashes     NEURO: Normal strength and tone, sensory exam grossly normal, mentation intact and speech normal     PSYCH: mentation appears normal and anxious     LYMPHATICS: No axillary, cervical, or supraclavicular nodes    DIAGNOSTICS:                                                    EKG: Not indicated due to non-vascular surgery and low risk of event (age <65 and without cardiac risk factors)    Results for orders placed " "or performed during the hospital encounter of 08/10/17   Surgical pathology exam   Result Value Ref Range    Copath Report       Patient Name: JOBY RAMON  MR#: [id number]  Specimen #: [id number]  Collected: 8/10/2017  Received: 8/11/2017  Reported: 8/14/2017 12:52  Ordering Phy(s): LATOSHA ESCALONA    For improved result formatting, select 'View Enhanced Report Format'  under Linked Documents section.    SPECIMEN(S):  A: Endometrial curettings  B: Cervical biopsy, 9 o'clock  C: Cervical biopsy, 2 o'clock  D: Cervical biopsy, 7 o'clock    FINAL DIAGNOSIS:  A. Cervix, endometrial curettings:  - Cervical glandular and squamous epithelium with no evidence of  dysplasia or malignancy.    B. Cervix, 9 o'clock, biopsy:  - Focal squamous atypia; cannot exclude koilocytosis.  - No evidence of high grade squamous intraepithelial lesion or  malignancy.  - No cervical glandular mucosa seen.    C. Cervix, 2 o'clock, biopsy:  - Squamous mucosa with no evidence of dysplasia or malignancy.  - No cervical glandular mucosa seen.    D. Cervix, 7 o'clock, biopsy:  - Squamous mucosa with no evidence of dysplasia or m alignancy.  - A very small area of cervical glandular mucosa with no diagnostic  alterations.    COMMENT:  The previous Pap smear [id number] was reviewed and the diagnosis of LSIL  was confirmed.    Electronically signed out by:    Charles Holland M.D., PhD    CLINICAL HISTORY:  41 year old female.    GROSS:  A. The specimen is received in formalin, labeled with the patient's name  and date of birth, and designated \"endometrial curettings\". It consists  of a 2.5 x 1.5 x 0.4 cm aggregate of red-brown, hemorrhagic soft tissue  fragments admixed with mucus.  The specimen is filtered over lens paper,  wrapped, and entirely submitted in one cassette.    B. The specimen is received in formalin, labeled with the patient's name  and date of birth, and designated \"cervical biopsy 9:00\". It consists of  two unoriented " "fragments of tan-white soft tissue admixed with a scant  amount of mucus, measuring 0.3 cm and 0.4 cm in greatest dimension.  Entirely submitted in one cassette.    C. The specimen is  received in formalin, labeled with the patient's name  and date of birth, and designated \"cervical biopsy 2:00\". It consists of  one fragment of tan-white soft tissue admixed with a scant amount of  mucus, measuring up to 0.3 cm in greatest dimension.  Entirely submitted  in one cassette.    D. The specimen is received in formalin, labeled with the patient's name  and date of birth, and designated \"cervical biopsy 7:00\". It consists of  one fragment of tan-white soft tissue admixed with a scant amount of  mucus, measuring up to 0.3 cm in greatest dimension.  The specimen is  inked blue as an identification marker.  Entirely submitted in one  cassette. (Dictated by: Nayla MANSFIELD 8/11/2017 09:48 AM)    MICROSCOPIC:  Microscopic examination is performed.    CPT Codes:  A: 59590-BS0  B: 41776-ZQ6  C: 39949-VZ2  D: 55261-YQ0    TESTING LAB LOCATION:  [address] [address]  [phone]    COLLECTION SI TE:  [facility]  [facility]: [facility]          Recent Labs   Lab Test  08/10/17   1228  03/24/17   0803  10/02/15   0823   HGB   --   14.5  14.4   PLT   --   226  207   INR   --   0.87   --    NA  142  144  135   POTASSIUM  4.1  3.8  3.9   CR  0.67  0.68  0.60        IMPRESSION:                                                    Reason for surgery/procedure: dysmenorrhea, suspected adenomyosis, cervical dysplasia  Diagnosis/reason for consult: perioperative risk evaluation for laparoscopic hysterectomy    The proposed surgical procedure is considered INTERMEDIATE risk.    REVISED CARDIAC RISK INDEX  The patient has the following serious cardiovascular risks for perioperative complications such as (MI, PE, VFib and 3  AV Block):  No " serious cardiac risks  INTERPRETATION: 0 risks: Class I (very low risk - 0.4% complication rate)    The patient has the following additional risks for perioperative complications:  The 10-year ASCVD risk score (Ellie BREWER Jr, et al., 2013) is: 25%    Values used to calculate the score:      Age: 41 years      Sex: Female      Is Non- : No      Diabetic: No      Tobacco smoker: Yes      Systolic Blood Pressure: 155 mmHg      Is BP treated: Yes      HDL Cholesterol: 30 mg/dL      Total Cholesterol: 287 mg/dL  Delirium or Dementia      ICD-10-CM    1. Preop general physical exam Z01.818    2. Dysmenorrhea N94.6    3. Gastroesophageal reflux disease, esophagitis presence not specified K21.9    4. Anxiety F41.9 sertraline (ZOLOFT) 100 MG tablet     propranolol (INDERAL) 20 MG tablet     QUEtiapine (SEROQUEL) 50 MG tablet     LORazepam (ATIVAN) 1 MG tablet   5. Hyperlipidemia LDL goal <100 E78.5    6. Opioid dependence in remission (H) F11.21    7. Tobacco abuse Z72.0    8. Sinus tachycardia R00.0 propranolol (INDERAL) 20 MG tablet   9. Psychophysiological insomnia F51.04 QUEtiapine (SEROQUEL) 50 MG tablet   10. Anxiety attack F41.0 ondansetron (ZOFRAN) 4 MG tablet   11. Need for prophylactic vaccination and inoculation against influenza Z23 FLU VAC, SPLIT VIRUS IM > 3 YO (QUADRIVALENT) [45684]     Vaccine Administration, Initial [39395]       RECOMMENDATIONS:                                                        Cardiovascular Risk  Performs 4 METs exercise without symptoms (Climb a flight of stairs) .       Pulmonary Risk  Incentive spirometry post op  Respiratory Therapy (Respiratory Care IP Consult)  post op  NG tube decompression if abdominal distension or significant vomiting   Advised smoking cessation.           --Patient is to take all scheduled medications on the day of surgery EXCEPT for modifications listed below.    APPROVAL GIVEN to proceed with proposed procedure, without further  diagnostic evaluation       Signed Electronically by: Clemente Chino MD, MD    Copy of this evaluation report is provided to requesting physician.    [facility] Hospital[facility] GuidelinesAnswers for HPI/ROS submitted by the patient on 10/16/2017   If you checked off any problems, how difficult have these problems made it for you to do your work, take care of things at home, or get along with other people?: Very difficult  PHQ9 TOTAL SCORE: 9  BO 7 TOTAL SCORE: 11

## 2017-10-11 NOTE — PATIENT INSTRUCTIONS
Before Your Surgery      Call your surgeon if there is any change in your health. This includes signs of a cold or flu (such as a sore throat, runny nose, cough, rash or fever).    Do not smoke, drink alcohol or take over the counter medicine (unless your surgeon or primary care doctor tells you to) for the 24 hours before and after surgery.    If you take prescribed drugs: Follow your doctor s orders about which medicines to take and which to stop until after surgery.    Eating and drinking prior to surgery: follow the instructions from your surgeon    Take a shower or bath the night before surgery. Use the soap your surgeon gave you to gently clean your skin. If you do not have soap from your surgeon, use your regular soap. Do not shave or scrub the surgery site.  Wear clean pajamas and have clean sheets on your bed.     I recommend quitting smoking.    Let's increase your Zoloft to 150 mg daily to help with anxiety.    Start propranolol to help with your rapid heart rate and anxiety.    Be sure to take your omeprazole and propranolol the morning of surgery with a sip of water.  Other medications can be taken as well or can be help until after surgery, as you prefer.    Follow up in 1-2 months for your anxiety.    Contact us or return if questions or concerns.     Have a nice day!        Dr. Chino

## 2017-10-12 RX ORDER — QUETIAPINE FUMARATE 50 MG/1
TABLET, FILM COATED ORAL
Qty: 60 TABLET | Refills: 0 | Status: SHIPPED | OUTPATIENT
Start: 2017-10-12 | End: 2017-10-16

## 2017-10-12 NOTE — TELEPHONE ENCOUNTER
Routing refill request to provider for review/approval because:  A break in medication    Erick Nicolas, RN, BSN

## 2017-10-16 ENCOUNTER — OFFICE VISIT (OUTPATIENT)
Dept: FAMILY MEDICINE | Facility: OTHER | Age: 42
End: 2017-10-16
Payer: COMMERCIAL

## 2017-10-16 VITALS
TEMPERATURE: 98.2 F | BODY MASS INDEX: 25.79 KG/M2 | WEIGHT: 154.8 LBS | DIASTOLIC BLOOD PRESSURE: 84 MMHG | SYSTOLIC BLOOD PRESSURE: 130 MMHG | HEART RATE: 112 BPM | RESPIRATION RATE: 12 BRPM | HEIGHT: 65 IN

## 2017-10-16 DIAGNOSIS — K21.9 GASTROESOPHAGEAL REFLUX DISEASE, ESOPHAGITIS PRESENCE NOT SPECIFIED: ICD-10-CM

## 2017-10-16 DIAGNOSIS — F51.04 PSYCHOPHYSIOLOGICAL INSOMNIA: ICD-10-CM

## 2017-10-16 DIAGNOSIS — F41.9 ANXIETY: ICD-10-CM

## 2017-10-16 DIAGNOSIS — N94.6 DYSMENORRHEA: ICD-10-CM

## 2017-10-16 DIAGNOSIS — Z23 NEED FOR PROPHYLACTIC VACCINATION AND INOCULATION AGAINST INFLUENZA: ICD-10-CM

## 2017-10-16 DIAGNOSIS — R00.0 SINUS TACHYCARDIA: ICD-10-CM

## 2017-10-16 DIAGNOSIS — F41.0 ANXIETY ATTACK: ICD-10-CM

## 2017-10-16 DIAGNOSIS — Z01.818 PREOP GENERAL PHYSICAL EXAM: Primary | ICD-10-CM

## 2017-10-16 DIAGNOSIS — F11.21 OPIOID DEPENDENCE IN REMISSION (H): ICD-10-CM

## 2017-10-16 DIAGNOSIS — E78.5 HYPERLIPIDEMIA LDL GOAL <100: ICD-10-CM

## 2017-10-16 DIAGNOSIS — Z72.0 TOBACCO ABUSE: ICD-10-CM

## 2017-10-16 PROCEDURE — 90471 IMMUNIZATION ADMIN: CPT | Performed by: FAMILY MEDICINE

## 2017-10-16 PROCEDURE — 90686 IIV4 VACC NO PRSV 0.5 ML IM: CPT | Performed by: FAMILY MEDICINE

## 2017-10-16 PROCEDURE — 99215 OFFICE O/P EST HI 40 MIN: CPT | Mod: 25 | Performed by: FAMILY MEDICINE

## 2017-10-16 RX ORDER — LORAZEPAM 1 MG/1
0.5 TABLET ORAL EVERY 8 HOURS PRN
Qty: 20 TABLET | Refills: 1 | Status: SHIPPED | OUTPATIENT
Start: 2017-10-16 | End: 2017-12-06

## 2017-10-16 RX ORDER — SERTRALINE HYDROCHLORIDE 100 MG/1
150 TABLET, FILM COATED ORAL DAILY
Qty: 135 TABLET | Refills: 1 | Status: SHIPPED | OUTPATIENT
Start: 2017-10-16 | End: 2017-12-29

## 2017-10-16 RX ORDER — PROPRANOLOL HYDROCHLORIDE 20 MG/1
20 TABLET ORAL 2 TIMES DAILY
Qty: 180 TABLET | Refills: 1 | Status: SHIPPED | OUTPATIENT
Start: 2017-10-16 | End: 2018-06-06

## 2017-10-16 RX ORDER — ONDANSETRON 4 MG/1
TABLET, FILM COATED ORAL
Qty: 18 TABLET | Refills: 10 | Status: SHIPPED | OUTPATIENT
Start: 2017-10-16 | End: 2018-11-20

## 2017-10-16 RX ORDER — QUETIAPINE FUMARATE 50 MG/1
TABLET, FILM COATED ORAL
Qty: 60 TABLET | Refills: 3 | Status: SHIPPED | OUTPATIENT
Start: 2017-10-16 | End: 2017-12-29

## 2017-10-16 ASSESSMENT — PAIN SCALES - GENERAL: PAINLEVEL: SEVERE PAIN (6)

## 2017-10-16 ASSESSMENT — ANXIETY QUESTIONNAIRES
7. FEELING AFRAID AS IF SOMETHING AWFUL MIGHT HAPPEN: MORE THAN HALF THE DAYS
1. FEELING NERVOUS, ANXIOUS, OR ON EDGE: NEARLY EVERY DAY
5. BEING SO RESTLESS THAT IT IS HARD TO SIT STILL: NOT AT ALL
GAD7 TOTAL SCORE: 11
2. NOT BEING ABLE TO STOP OR CONTROL WORRYING: MORE THAN HALF THE DAYS
7. FEELING AFRAID AS IF SOMETHING AWFUL MIGHT HAPPEN: MORE THAN HALF THE DAYS
GAD7 TOTAL SCORE: 11
4. TROUBLE RELAXING: SEVERAL DAYS
GAD7 TOTAL SCORE: 11
3. WORRYING TOO MUCH ABOUT DIFFERENT THINGS: SEVERAL DAYS
6. BECOMING EASILY ANNOYED OR IRRITABLE: MORE THAN HALF THE DAYS

## 2017-10-16 ASSESSMENT — PATIENT HEALTH QUESTIONNAIRE - PHQ9
SUM OF ALL RESPONSES TO PHQ QUESTIONS 1-9: 9
SUM OF ALL RESPONSES TO PHQ QUESTIONS 1-9: 9
10. IF YOU CHECKED OFF ANY PROBLEMS, HOW DIFFICULT HAVE THESE PROBLEMS MADE IT FOR YOU TO DO YOUR WORK, TAKE CARE OF THINGS AT HOME, OR GET ALONG WITH OTHER PEOPLE: VERY DIFFICULT

## 2017-10-16 NOTE — NURSING NOTE
"Chief Complaint   Patient presents with     Pre-Op Exam     Panel Management     MyChart, Flu shot, phq, aletha       Initial /84 (BP Location: Left arm, Patient Position: Chair, Cuff Size: Adult Regular)  Pulse 112  Temp 98.2  F (36.8  C) (Temporal)  Resp 12  Ht 5' 5\" (1.651 m)  Wt 154 lb 12.8 oz (70.2 kg)  BMI 25.76 kg/m2 Estimated body mass index is 25.76 kg/(m^2) as calculated from the following:    Height as of this encounter: 5' 5\" (1.651 m).    Weight as of this encounter: 154 lb 12.8 oz (70.2 kg).  Medication Reconciliation: complete  Zenon Steiner, LYSSA    "

## 2017-10-16 NOTE — PROGRESS NOTES
SUBJECTIVE:                                                    Radha Beckwith is a 41 year old female who presents to clinic today for the following health issues:  Not taking tylenol with codeine     HPI    Anxiety Follow-Up    Status since last visit: Worsened, related to increased stressors in life lately.    Other associated symptoms: heart starts beating really fast, headaches, start thinking about things and wants to start crawling out of her own skin feeling (feels like that)    Complicating factors:   Significant life event: Yes-  Her son is in [place]   Current substance abuse: None  Depression symptoms: Yes-    BO-7 SCORE 3/30/2017 6/9/2017 8/9/2017   Total Score - - 11 (moderate anxiety)   Total Score 16 14 11       GAD7      PHQ-9 SCORE 6/9/2017 8/9/2017 10/16/2017   Total Score MyChart - 8 (Mild depression) 9 (Mild depression)   Total Score 7 8 9           Problem list and histories reviewed & adjusted, as indicated.  Additional history: as documented      Current Outpatient Prescriptions   Medication Sig Dispense Refill     QUEtiapine (SEROQUEL) 50 MG tablet TAKE ONE OR TWO TABLETS BY MOUTH NIGHTLY AS NEEDED 60 tablet 0     LORazepam (ATIVAN) 1 MG tablet Take 0.5 tablets (0.5 mg) by mouth every 8 hours as needed for anxiety 20 tablet 0     atorvastatin (LIPITOR) 20 MG tablet Take 1 tablet (20 mg) by mouth daily 90 tablet 1     cetirizine (ZYRTEC) 10 MG tablet Take 1 tablet (10 mg) by mouth every evening 30 tablet 3     medroxyPROGESTERone (DEPO-PROVERA) 150 MG/ML injection Inject 1 mL (150 mg) into the muscle every 3 months 3 mL 3     sertraline (ZOLOFT) 100 MG tablet Take 1 tablet (100 mg) by mouth daily 90 tablet 1     order for DME Blood pressure cuff 1 each 0     omeprazole 20 MG tablet Take 1 tablet (20 mg) by mouth 2 times daily Take 30-60 minutes before a meal. 60 tablet 3     ondansetron (ZOFRAN) 4 MG tablet TAKE ONE TABLET BY MOUTH EVERY 6 HOURS AS NEEDED FOR NAUSEA OR VOMITING 18 tablet  "10     sucralfate (CARAFATE) 1 GM tablet Take 1 tablet (1 g) by mouth 4 times daily 120 tablet 1     Recent Labs   Lab Test  08/10/17   1228  03/24/17   0803   06/23/15   1610   LDL  Cannot estimate LDL when triglyceride exceeds 400 mg/dL  164*   --    --    --    HDL  30*   --    --    --    TRIG  608*   --    --    --    ALT  35  35   --   32   CR  0.67  0.68   < >  0.97   GFRESTIMATED  >90  Non  GFR Calc    >90  Non  GFR Calc     < >  63   GFRESTBLACK  >90   GFR Calc    >90   GFR Calc     < >  76   POTASSIUM  4.1  3.8   < >  2.6*   TSH  1.01   --    --    --     < > = values in this interval not displayed.      BP Readings from Last 3 Encounters:   10/16/17 130/84   08/29/17 118/76   08/28/17 104/76    Wt Readings from Last 3 Encounters:   10/16/17 154 lb 12.8 oz (70.2 kg)   09/28/17 154 lb 4.8 oz (70 kg)   08/29/17 150 lb (68 kg)             ROS:  See other note    OBJECTIVE:     /84 (BP Location: Left arm, Patient Position: Chair, Cuff Size: Adult Regular)  Pulse 112  Temp 98.2  F (36.8  C) (Temporal)  Resp 12  Ht 5' 5\" (1.651 m)  Wt 154 lb 12.8 oz (70.2 kg)  BMI 25.76 kg/m2  Body mass index is 25.76 kg/(m^2).  See other note      ASSESSMENT/PLAN:     Migraine: slightly worsened   Plan:  The following changes are made: Add propranol for possible migraine prophylaxis (as well as sinus tachycardia). Recommend patient can take metoclopramide for intermittent headache/migraine.           Tobacco Cessation:   reports that she has been smoking Cigarettes.  She has a 5.00 pack-year smoking history. She has never used smokeless tobacco.  Tobacco Cessation Action Plan: Information offered: Patient not interested at this time      ICD-10-CM    1. Preop general physical exam Z01.818    2. Dysmenorrhea N94.6    3. Gastroesophageal reflux disease, esophagitis presence not specified K21.9    4. Anxiety F41.9 sertraline (ZOLOFT) 100 MG tablet     " propranolol (INDERAL) 20 MG tablet     QUEtiapine (SEROQUEL) 50 MG tablet     LORazepam (ATIVAN) 1 MG tablet   5. Hyperlipidemia LDL goal <100 E78.5    6. Opioid dependence in remission (H) F11.21    7. Tobacco abuse Z72.0    8. Sinus tachycardia R00.0 propranolol (INDERAL) 20 MG tablet   9. Psychophysiological insomnia F51.04 QUEtiapine (SEROQUEL) 50 MG tablet   10. Anxiety attack F41.0 ondansetron (ZOFRAN) 4 MG tablet   11. Need for prophylactic vaccination and inoculation against influenza Z23 FLU VAC, SPLIT VIRUS IM > 3 YO (QUADRIVALENT) [09715]     Vaccine Administration, Initial [28205]   1,2. See other note. Patient to have laparoscopic hysterectomy 10/30.   4. Patient with increased anxiety symptoms, likely in setting of family stress. Increased sertraline to 150 mg daily. Also adding propranolol for sinus tachycardia and anxiety symptoms. Pt also taking ativan 1 mg once or twice daily; hopeful that increasing zoloft to cover baseline anxiety will decrease use of ativan.   8. Patient with consistently elevated heart rate. Starting on propranolol 20 mg daily. Hopeful this will also improve anxiety symptoms, as well as headache/migraine prophylaxis.     Patient Instructions     Before Your Surgery      Call your surgeon if there is any change in your health. This includes signs of a cold or flu (such as a sore throat, runny nose, cough, rash or fever).    Do not smoke, drink alcohol or take over the counter medicine (unless your surgeon or primary care doctor tells you to) for the 24 hours before and after surgery.    If you take prescribed drugs: Follow your doctor s orders about which medicines to take and which to stop until after surgery.    Eating and drinking prior to surgery: follow the instructions from your surgeon    Take a shower or bath the night before surgery. Use the soap your surgeon gave you to gently clean your skin. If you do not have soap from your surgeon, use your regular soap. Do not  shave or scrub the surgery site.  Wear clean pajamas and have clean sheets on your bed.     I recommend quitting smoking.    Let's increase your Zoloft to 150 mg daily to help with anxiety.    Start propranolol to help with your rapid heart rate and anxiety.    Be sure to take your omeprazole and propranolol the morning of surgery with a sip of water.  Other medications can be taken as well or can be help until after surgery, as you prefer.    Follow up in 1-2 months for your anxiety.    Contact us or return if questions or concerns.     Have a nice day!        Dr. Matti Chino MD, MD  [facility]    Scribed by Latha Mcfadden, MS3    This patient was seen and examined by myself as well as the medical student.  The medical student has scribed the note and I have reviewed it, edited it appropriately and agree with the final documentation. Electronically signed by Clemente Chino MD

## 2017-10-16 NOTE — MR AVS SNAPSHOT
After Visit Summary   10/16/2017    Radha Beckwith    MRN: [id number]           Patient Information     Date Of Birth          1975        Visit Information        Provider Department      10/16/2017 8:30 AM Clemente Chino MD [facility]'s Diagnoses     Preop general physical exam    -  1    Dysmenorrhea        Gastroesophageal reflux disease, esophagitis presence not specified        Anxiety        Hyperlipidemia LDL goal <100        Opioid dependence in remission (H)        Tobacco abuse        Sinus tachycardia        Psychophysiological insomnia        Anxiety attack          Care Instructions      Before Your Surgery      Call your surgeon if there is any change in your health. This includes signs of a cold or flu (such as a sore throat, runny nose, cough, rash or fever).    Do not smoke, drink alcohol or take over the counter medicine (unless your surgeon or primary care doctor tells you to) for the 24 hours before and after surgery.    If you take prescribed drugs: Follow your doctor s orders about which medicines to take and which to stop until after surgery.    Eating and drinking prior to surgery: follow the instructions from your surgeon    Take a shower or bath the night before surgery. Use the soap your surgeon gave you to gently clean your skin. If you do not have soap from your surgeon, use your regular soap. Do not shave or scrub the surgery site.  Wear clean pajamas and have clean sheets on your bed.     I recommend quitting smoking.    Let's increase your Zoloft to 150 mg daily to help with anxiety.    Start propranolol to help with your rapid heart rate and anxiety.    Be sure to take your omeprazole and propranolol the morning of surgery with a sip of water.  Other medications can be taken as well or can be help until after surgery, as you prefer.    Follow up in 1-2 months for your anxiety.    Contact us or return if questions or  concerns.     Have a nice day!        Dr. Chino           Follow-ups after your visit        Your next 10 appointments already scheduled     Oct 24, 2017  8:50 AM CDT   SHORT with Michael Chandler MD   [facility]    [address] [place] [id number] [phone]            Oct 29, 2017 11:45 AM CDT   LAB with [facility]   [facility] ([facility])    [address] [place] [id number] [phone]           Patient must bring picture ID. Patient should be prepared to give a urine specimen  Please do not eat 10-12 hours before your appointment if you are coming in fasting for labs on lipids, cholesterol, or glucose (sugar). Pregnant women should follow their Care Team instructions. Water with medications is okay. Do not drink coffee or other fluids. If you have concerns about taking  your medications, please ask at office or if scheduling via [employer], send a message by clicking on Secure Messaging, Message Your Care Team.            Oct 30, 2017   Procedure with Michael Chandler MD   [facility] ([facility])    [address] [place] [address] [place] [address]   [phone]           From [address]: Exit at [address] on [place] side of [place]. Turn right on [address]. Turn left at stoplight on [address]. [facility] will be in view two blocks ahead            Nov 02, 2017 11:10 AM CDT   SHORT with Micahel Chandler MD   [facility] ([facility])    [address] [place] [id number] [phone]              Who to contact     If you have questions or need follow up information about today's clinic visit or your schedule please contact [facility] directly at [phone].  Normal or non-critical lab and imaging results will be communicated to you by MyChart, letter or phone within  "4 business days after the clinic has received the results. If you do not hear from us within 7 days, please contact the clinic through [employer] or phone. If you have a critical or abnormal lab result, we will notify you by phone as soon as possible.  Submit refill requests through [employer] or call your pharmacy and they will forward the refill request to us. Please allow 3 business days for your refill to be completed.          Additional Information About Your Visit        [employer] Information     [employer] lets you send messages to your doctor, view your test results, renew your prescriptions, schedule appointments and more. To sign up, go to [email or web address] . Click on \"Log in\" on the left side of the screen, which will take you to the Welcome page. Then click on \"Sign up Now\" on the right side of the page.     You will be asked to enter the access code listed below, as well as some personal information. Please follow the directions to create your username and password.     Your access code is: [email or web address]  Expires: 2017  3:08 PM     Your access code will  in 90 days. If you need help or a new code, please call your [facility] clinic or [phone].        Care EveryWhere ID     This is your Care EveryWhere ID. This could be used by other organizations to access your [facility] medical records  [id number]        Your Vitals Were     Pulse Temperature Respirations Height BMI (Body Mass Index)       112 98.2  F (36.8  C) (Temporal) 12 5' 5\" (1.651 m) 25.76 kg/m2        Blood Pressure from Last 3 Encounters:   10/16/17 130/84   17 118/76   17 104/76    Weight from Last 3 Encounters:   10/16/17 154 lb 12.8 oz (70.2 kg)   17 154 lb 4.8 oz (70 kg)   17 150 lb (68 kg)              Today, you had the following     No orders found for display         Today's Medication Changes          These changes are accurate as of: 10/16/17  9:19 AM.  If you have any questions, ask your nurse or " doctor.               Start taking these medicines.        Dose/Directions    propranolol 20 MG tablet   Commonly known as:  INDERAL   Used for:  Sinus tachycardia, Anxiety   Started by:  Clemente Chino MD        Dose:  20 mg   Take 1 tablet (20 mg) by mouth 2 times daily   Quantity:  180 tablet   Refills:  1         These medicines have changed or have updated prescriptions.        Dose/Directions    ondansetron 4 MG tablet   Commonly known as:  ZOFRAN   This may have changed:  See the new instructions.   Used for:  Anxiety attack   Changed by:  Clemente Chino MD        TAKE ONE TABLET BY MOUTH EVERY 6 HOURS AS NEEDED FOR NAUSEA OR VOMITING   Quantity:  18 tablet   Refills:  10       QUEtiapine 50 MG tablet   Commonly known as:  SEROquel   This may have changed:  See the new instructions.   Used for:  Anxiety, Psychophysiological insomnia   Changed by:  Clemente Chino MD        TAKE ONE OR TWO TABLETS BY MOUTH NIGHTLY AS NEEDED   Quantity:  60 tablet   Refills:  3       sertraline 100 MG tablet   Commonly known as:  ZOLOFT   This may have changed:  how much to take   Used for:  Anxiety   Changed by:  Clemente Chino MD        Dose:  150 mg   Take 1.5 tablets (150 mg) by mouth daily   Quantity:  135 tablet   Refills:  1            Where to get your medicines      These medications were sent to [facility] - [address]   [address] [place] [address]     Phone:  [phone]     ondansetron 4 MG tablet    propranolol 20 MG tablet    QUEtiapine 50 MG tablet    sertraline 100 MG tablet         Some of these will need a paper prescription and others can be bought over the counter.  Ask your nurse if you have questions.     Bring a paper prescription for each of these medications     LORazepam 1 MG tablet                Primary Care Provider Office Phone # Fax #    [facility] [phone] [address]  [facility] [address]        Equal Access to Services     [facility] SAMI : Hadii aad ku hadtarieverardo Holt, wasimida matthewlubaha, qaaundreata baltaezioyadi burk. So [facility] [phone].    ATENCIÓN: Si habla español, tiene a rueda disposición servicios gratuitos de asistencia lingüística. Nancy al [phone].    We comply with applicable federal civil rights laws and [place] laws. We do not discriminate on the basis of race, color, national origin, age, disability, sex, sexual orientation, or gender identity.            Thank you!     Thank you for choosing [facility]  for your care. Our goal is always to provide you with excellent care. Hearing back from our patients is one way we can continue to improve our services. Please take a few minutes to complete the written survey that you may receive in the mail after your visit with us. Thank you!             Your Updated Medication List - Protect others around you: Learn how to safely use, store and throw away your medicines at www.disposemymeds.org.          This list is accurate as of: 10/16/17  9:19 AM.  Always use your most recent med list.                   Brand Name Dispense Instructions for use Diagnosis    atorvastatin 20 MG tablet    LIPITOR    90 tablet    Take 1 tablet (20 mg) by mouth daily    Hyperlipidemia LDL goal <100       cetirizine 10 MG tablet    zyrTEC    30 tablet    Take 1 tablet (10 mg) by mouth every evening    Seasonal allergic rhinitis, unspecified chronicity, unspecified trigger       LORazepam 1 MG tablet    ATIVAN    20 tablet    Take 0.5 tablets (0.5 mg) by mouth every 8 hours as needed for anxiety    Anxiety       medroxyPROGESTERone 150 MG/ML injection    DEPO-PROVERA    3 mL    Inject 1 mL (150 mg) into the muscle every 3 months        omeprazole 20 MG tablet     60 tablet    Take 1 tablet (20 mg) by mouth 2 times daily Take 30-60 minutes before a meal.    Gastroesophageal reflux  disease, esophagitis presence not specified       ondansetron 4 MG tablet    ZOFRAN    18 tablet    TAKE ONE TABLET BY MOUTH EVERY 6 HOURS AS NEEDED FOR NAUSEA OR VOMITING    Anxiety attack       order for DME     1 each    Blood pressure cuff    Elevated blood pressure reading without diagnosis of hypertension       propranolol 20 MG tablet    INDERAL    180 tablet    Take 1 tablet (20 mg) by mouth 2 times daily    Sinus tachycardia, Anxiety       QUEtiapine 50 MG tablet    SEROquel    60 tablet    TAKE ONE OR TWO TABLETS BY MOUTH NIGHTLY AS NEEDED    Anxiety, Psychophysiological insomnia       sertraline 100 MG tablet    ZOLOFT    135 tablet    Take 1.5 tablets (150 mg) by mouth daily    Anxiety       sucralfate 1 GM tablet    CARAFATE    120 tablet    Take 1 tablet (1 g) by mouth 4 times daily    Gastroesophageal reflux disease, esophagitis presence not specified

## 2017-10-16 NOTE — PROGRESS NOTES
Injectable Influenza Immunization Documentation    1.  Is the person to be vaccinated sick today?   No    2. Does the person to be vaccinated have an allergy to a component   of the vaccine?   No    3. Has the person to be vaccinated ever had a serious reaction   to influenza vaccine in the past?   No    4. Has the person to be vaccinated ever had Guillain-Barré syndrome?   No    Form completed by Zenon Steiner, CMA  Per orders of Dr. Chino, injection of Flu shot given by Zenon Steiner. Patient instructed to remain in clinic for 15 minutes afterwards, and to report any adverse reaction to me immediately.

## 2017-10-17 ASSESSMENT — ANXIETY QUESTIONNAIRES: GAD7 TOTAL SCORE: 11

## 2017-10-17 ASSESSMENT — PATIENT HEALTH QUESTIONNAIRE - PHQ9: SUM OF ALL RESPONSES TO PHQ QUESTIONS 1-9: 9

## 2017-10-23 ENCOUNTER — TELEPHONE (OUTPATIENT)
Dept: FAMILY MEDICINE | Facility: CLINIC | Age: 42
End: 2017-10-23

## 2017-10-23 NOTE — TELEPHONE ENCOUNTER
Reason for Call:  Other call back    Detailed comments: Pt is wondering if she can come in around 1130-12 tomorrow instead of 8:50am? Please call her back and advise.     Phone Number Patient can be reached at: Home number on file [phone] (home)    Best Time: anytime    Can we leave a detailed message on this number? YES    Call taken on 10/23/2017 at 3:58 PM by Georgiana Hook

## 2017-10-23 NOTE — TELEPHONE ENCOUNTER
Spoke to patient. RM has surgery in the afternoon and is unable to work patient in later. patient agreeable to keep 850 appt  /Frida Navas CMA

## 2017-10-24 ENCOUNTER — OFFICE VISIT (OUTPATIENT)
Dept: FAMILY MEDICINE | Facility: CLINIC | Age: 42
End: 2017-10-24
Payer: COMMERCIAL

## 2017-10-24 VITALS
DIASTOLIC BLOOD PRESSURE: 82 MMHG | SYSTOLIC BLOOD PRESSURE: 155 MMHG | WEIGHT: 155 LBS | RESPIRATION RATE: 16 BRPM | OXYGEN SATURATION: 98 % | HEART RATE: 102 BPM | BODY MASS INDEX: 25.79 KG/M2 | TEMPERATURE: 97.1 F

## 2017-10-24 DIAGNOSIS — R10.2 PELVIC PAIN IN FEMALE: Primary | ICD-10-CM

## 2017-10-24 DIAGNOSIS — N87.9 CERVICAL DYSPLASIA: ICD-10-CM

## 2017-10-24 PROCEDURE — 99214 OFFICE O/P EST MOD 30 MIN: CPT | Performed by: OBSTETRICS & GYNECOLOGY

## 2017-10-24 ASSESSMENT — PAIN SCALES - GENERAL: PAINLEVEL: NO PAIN (0)

## 2017-10-24 NOTE — PROGRESS NOTES
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 She also has significant pelvic pain- with intercourse, with paps, with any pelvic exam. The most recent pelvic exam attempt couldn't be completed in the clinic- I had to do it in the OR for her colposcopy.. Her pelvic exams are very difficult and need to be performed under anesthesia. Therefore, in light of the need for many repeat paps in the future, a decision was made to perform hysterectomy to eliminate the need for so many paps which are very uncomfortable for her.    ASSESSMENT:  Cervical dysplasia and HR HPV with pelvic pain    PLAN:   HYSTERECTOMY-SEE BELOW-    REFERRING PHYSICIAN:  Matti  AGE:  41 year old  HEIGHT:  Data Unavailable  WEIGHT:  0 lbs 0 oz  ALLERGIES:     Allergies   Allergen Reactions     Cafergot      12-            GI problems-     Compazine      Droperidol      Nubain [Nalbuphine Hcl]      Seasonal Allergies      Sumatriptan      vomits after giving herself a shot     Prochlorperazine Palpitations     Uncontrolled movement       ULTRASOUND FINDINGS:   Uterus 7.0 x 3.9 x 4.1 cm   2 cm follicle left ovary  Right ovary normal    Past Medical History:   Diagnosis Date     Abdominal pain, right lower quadrant 03/09/08    Admit. Discharged 03/10/08     Anxiety attack 7/31/2015     Dehydration      Gastric ulcer 7/31/2015     GERD (gastroesophageal reflux disease) 7/28/2010    Takes omeprazole and metoclopramide      Opioid dependence in remission (H) 8/2/2015     Other and unspecified ovarian cyst      Papanicolaou smear of cervix with low grade squamous intraepithelial lesion (LGSIL) 07/07/2017     PLANNED SURGERY:  Total laparoscopic hysterectomy  And  laparoscopic bilateral salpingectomy       PLAN FOR INCISION:  Probable pfannenstiel incision, if incision is necessary. Final determination will be made at the time of  surgery.      I did inform the patient that the final decision about incision will be based on my examination under anesthesia.  I will use clinical judgment regarding how much surgical exposure is necessary and then determine if a midline or a transverse incision is best.    PLAN REGARDING HER CERVIX:  Remove if possible      The patient did read over an information sheet regarding whether to remove the cervix with the uterus or keep it intact.  I explained that there are some controversies surrounding whether the cervix is involved in sexual orgasm.  Therefore, some women choose to keep it.  Others feel that keeping the cervix contributes to the pelvic support of the vaginal apex and cuff.  However, she understands that if the cervix is kept, a Pap should be done at regular intervals to rule out cervical dysplasia.  Keeping the cervix is not generally advised for women with previously-abnormal Paps.  Also, retaining the cervix might lead to minor spotting at the time of menses because there is uterine tissue in the cervix.  If she decides to have the cervix removed at a future date, it will be more challenging surgery because the bladder may adhere to the back side of it, requiring surgical expertise at another institution to perform a trachelectomy.  She was advised to read more about this on line.  I explained that sometimes it is quite difficult to remove the cervix especially if there has been previous surgery such as a  section. If we stop short of removing the cervix it would be due to a safety concern such as excessive bleeding.     PLAN REGARDING OVARIES:  Keep intact if possible    We had a discussion about whether to keep the ovaries or remove them with surgery.  She knows that by keeping the ovaries, there is a 5% chance of needing another surgery in the future to remove one or both if a cyst or cancer develops.  If the ovaries are taken, then menopause ensues.  We discussed the symptoms and  risks of this, including risks of taking hormone replacement.    Bladder symptoms:   There are no   symptoms of stress urinary incontinence.      PLAN REGARDING FALLOPIAN TUBES: Remove if possible           We discussed that recent evidence has shown that removal of the fallopian tubes reduces the risk of ovarian cancer.  Therefore, I usually advise removing the tubes if they are accessible  but only if the procedure does not involve excessive risk due to adhesions or some other anatomical consideration.       RISKS DISCUSSION:  She was given several  detailed pamphlets  describing the surgery and the more common risks involved.  We discussed the risks, which include but are not limited to bleeding, infection, possible injury to the organs, including the bowel, bladder, ureters and other internal organs, possible need for cystoscopy, possible vesicovaginal fistula or rectovaginal fistula formation, possible nerve paresthesias due to retractor placement, possible changes in sexual function and changes in urination and anesthesia risks.   After answering her questions, I handed her a consent form and allowed her to review it thoroughly.  She was offered a second opinion and declined.  She understands that if a bladder injury is discovered it will be addressed but she may need to go home with a Claudio catheter in her bladder for one or more weeks to allow the bladder or other tissues to heal. We usually plan this surgery for a day when our urologist is present but there are infrequent occasions when the urologist has changed plans and is not available that day. I will likely perform a cystoscopy after the surgery to inspect the ureter openings for urine flow and also to make sure the bladder has no lacerations or sutures present. Sometimes that is difficult to tell even with cystoscopy.She signed the form witnessed by my nurse.  I signed it as well.     HOME SUPPORT:  She understands that she will need some help around  the home for the first several weeks after surgery.    EXPECTATIONS:  I told her that while most surgeries go smoothly, a perfectly-smooth course should not be assumed.  She should expect some form of inconvenience along the way.  It might be a minor wound infection or a slow return of bowel function or perhaps a more serious concern, such as pneumonia or blood clot.  At any rate, I and my partners will follow her through these unanticipated obstacles.  She knows to expect 3 or 4 days in the hospital if open laparotomy is done and 6 weeks at home recuperating and 3-4 months until full recovery occurs. If laparoscopy is successful she may be discharged to home as same-day surgery. She will need to limit lifting to 10 pounds for 6-12 weeks after surgery depending upon what is done.  I advised her to limit visitors on the first hospital day to allow her pain medications to work more effectively.  She may  undergo a preoperative bowel prep and clear-liquid diet 1 day prior to surgery.   She knows to be n.p.o. 8 hours before the surgery.  She will have preoperative laboratory work.  If it is abnormal, I may choose to postpone her surgery.        After discussing these issues today and on previous occasions, I feel that she has a very-thorough understanding of the indications for surgery and the possible risks associated with it.          Note: The information listed above was given to the patient in a separate handout detailing the specific as well as the general risks pertaining to this surgery that we discussed today before she signed the consent.                   A total of 30 minutes were spent face-to-face with this patient during today's consultation, with more than 50% of that time devoted to conversation and counseling about the management decisions.          Thank you for this consultation.     RJ Chandler MD

## 2017-10-24 NOTE — MR AVS SNAPSHOT
After Visit Summary   10/24/2017    Radha Beckwith    MRN: [id number]           Patient Information     Date Of Birth          1975        Visit Information        Provider Department      10/24/2017 8:50 AM Michael Chandler MD [facility]        Today's Diagnoses     Pelvic pain in female    -  1    Cervical dysplasia           Follow-ups after your visit        Your next 10 appointments already scheduled     Oct 29, 2017 11:45 AM CDT   LAB with [facility]   [facility] ([facility])    [address] [place] [id number]   [phone]           Patient must bring picture ID. Patient should be prepared to give a urine specimen  Please do not eat 10-12 hours before your appointment if you are coming in fasting for labs on lipids, cholesterol, or glucose (sugar). Pregnant women should follow their Care Team instructions. Water with medications is okay. Do not drink coffee or other fluids. If you have concerns about taking  your medications, please ask at office or if scheduling via [facility] [employer], send a message by clicking on Secure Messaging, Message Your Care Team.            Oct 30, 2017   Procedure with Michael Chandler MD   [facility] ([facility])    [address] [place] [id number] [phone]           From [address]: Exit at [email or web address] on [place] side of [place]. Turn right on [email or web address]. Turn left at stoplight on [address]. [facility] will be in view two blocks ahead            Nov 02, 2017 11:10 AM CDT   SHORT with Michael Chandler MD   [facility] ([facility])    [address] [place] [id number]   [phone]              Who to contact     If you have questions or need follow up information about today's clinic visit or your schedule please contact [facility] directly at  "[phone].  Normal or non-critical lab and imaging results will be communicated to you by [employer], letter or phone within 4 business days after the clinic has received the results. If you do not hear from us within 7 days, please contact the clinic through [employer] or phone. If you have a critical or abnormal lab result, we will notify you by phone as soon as possible.  Submit refill requests through [employer] or call your pharmacy and they will forward the refill request to us. Please allow 3 business days for your refill to be completed.          Additional Information About Your Visit        [employer] Information     [employer] lets you send messages to your doctor, view your test results, renew your prescriptions, schedule appointments and more. To sign up, go to [email or web address] . Click on \"Log in\" on the left side of the screen, which will take you to the Welcome page. Then click on \"Sign up Now\" on the right side of the page.     You will be asked to enter the access code listed below, as well as some personal information. Please follow the directions to create your username and password.     Your access code is: [email or web address]  Expires: 2017  3:08 PM     Your access code will  in 90 days. If you need help or a new code, please call your [facility] clinic or [phone].        Care EveryWhere ID     This is your Care EveryWhere ID. This could be used by other organizations to access your [facility] medical records  [id number]        Your Vitals Were     Pulse Temperature Respirations Pulse Oximetry Breastfeeding? BMI (Body Mass Index)    102 97.1  F (36.2  C) (Temporal) 16 98% No 25.79 kg/m2       Blood Pressure from Last 3 Encounters:   10/24/17 155/82   10/16/17 130/84   17 118/76    Weight from Last 3 Encounters:   10/24/17 155 lb (70.3 kg)   10/16/17 154 lb 12.8 oz (70.2 kg)   17 154 lb 4.8 oz (70 kg)              Today, you had the following     No orders found for display       " Primary Care Provider Office Phone # Fax #    Kassandra [facility] [phone] [facility] [place] [address]        Equal Access to Services     DIONNE GALLARDO : Hadii aad ku hadtarieverardo Jonathon, wasimida luqadaha, qaybta kaalmada porfirio, yadi campo eranbenjamin page seven luong. So [facility] [phone].    ATENCIÓN: Si habla español, tiene a rueda disposición servicios gratuitos de asistencia lingüística. Llame al [phone].    We comply with applicable federal civil rights laws and [place] laws. We do not discriminate on the basis of race, color, national origin, age, disability, sex, sexual orientation, or gender identity.            Thank you!     Thank you for choosing [facility]  for your care. Our goal is always to provide you with excellent care. Hearing back from our patients is one way we can continue to improve our services. Please take a few minutes to complete the written survey that you may receive in the mail after your visit with us. Thank you!             Your Updated Medication List - Protect others around you: Learn how to safely use, store and throw away your medicines at www.disposemymeds.org.          This list is accurate as of: 10/24/17  9:20 AM.  Always use your most recent med list.                   Brand Name Dispense Instructions for use Diagnosis    atorvastatin 20 MG tablet    LIPITOR    90 tablet    Take 1 tablet (20 mg) by mouth daily    Hyperlipidemia LDL goal <100       cetirizine 10 MG tablet    zyrTEC    30 tablet    Take 1 tablet (10 mg) by mouth every evening    Seasonal allergic rhinitis, unspecified chronicity, unspecified trigger       LORazepam 1 MG tablet    ATIVAN    20 tablet    Take 0.5 tablets (0.5 mg) by mouth every 8 hours as needed for anxiety    Anxiety       medroxyPROGESTERone 150 MG/ML injection    DEPO-PROVERA    3 mL    Inject 1 mL (150 mg) into the muscle every 3 months        omeprazole 20 MG tablet     60  tablet    Take 1 tablet (20 mg) by mouth 2 times daily Take 30-60 minutes before a meal.    Gastroesophageal reflux disease, esophagitis presence not specified       ondansetron 4 MG tablet    ZOFRAN    18 tablet    TAKE ONE TABLET BY MOUTH EVERY 6 HOURS AS NEEDED FOR NAUSEA OR VOMITING    Anxiety attack       order for DME     1 each    Blood pressure cuff    Elevated blood pressure reading without diagnosis of hypertension       propranolol 20 MG tablet    INDERAL    180 tablet    Take 1 tablet (20 mg) by mouth 2 times daily    Sinus tachycardia, Anxiety       QUEtiapine 50 MG tablet    SEROquel    60 tablet    TAKE ONE OR TWO TABLETS BY MOUTH NIGHTLY AS NEEDED    Anxiety, Psychophysiological insomnia       sertraline 100 MG tablet    ZOLOFT    135 tablet    Take 1.5 tablets (150 mg) by mouth daily    Anxiety       sucralfate 1 GM tablet    CARAFATE    120 tablet    Take 1 tablet (1 g) by mouth 4 times daily    Gastroesophageal reflux disease, esophagitis presence not specified

## 2017-10-24 NOTE — NURSING NOTE
"Chief Complaint   Patient presents with     consent       Initial /82 (Cuff Size: Adult Regular)  Pulse 102  Temp 97.1  F (36.2  C) (Temporal)  Resp 16  Wt 155 lb (70.3 kg)  SpO2 98%  Breastfeeding? No  BMI 25.79 kg/m2 Estimated body mass index is 25.79 kg/(m^2) as calculated from the following:    Height as of 10/16/17: 5' 5\" (1.651 m).    Weight as of this encounter: 155 lb (70.3 kg).  Medication Reconciliation: complete   Neeraj Penn MA      "

## 2017-10-27 DIAGNOSIS — K21.9 GASTROESOPHAGEAL REFLUX DISEASE, ESOPHAGITIS PRESENCE NOT SPECIFIED: ICD-10-CM

## 2017-10-27 NOTE — H&P (VIEW-ONLY)
[facility]  [address] [facility] [address]  [phone]  Dept: [phone]    PRE-OP EVALUATION:  Today's date: 10/16/2017    Radha Beckwith (: 1975) presents for pre-operative evaluation assessment as requested by Dr. Chandler.  She requires evaluation and anesthesia risk assessment prior to undergoing surgery/procedure for treatment of abnormal uterine bleeding and painful periods, cervical dysplasia.  Proposed procedure: Laparoscopic hysterectomy    Date of Surgery/ Procedure: 10/30/17  Time of Surgery/ Procedure: 9:00 am  Hospital/Surgical Facility: [place]  Fax number for surgical facility:   Primary Physician: Kassandra Bergmanman  Type of Anesthesia Anticipated: to be determined    Patient has a Health Care Directive or Living Will:  NO    Preop Questions 10/16/2017   1.  Do you have a history of heart attack, stroke, stent, bypass or surgery on an artery in the head, neck, heart or legs? No   2.  Do you ever have any pain or discomfort in your chest? No   3.  Do you have a history of  Heart Failure? No   4.   Are you troubled by shortness of breath when:  walking on a level surface, or up a slight hill, or at night? No   5.  Do you currently have a cold, bronchitis or other respiratory infection? No   6.  Do you have a cough, shortness of breath, or wheezing? No   7.  Do you sometimes get pains in the calves of your legs when you walk? No   8. Do you or anyone in your family have previous history of blood clots? No   9.  Do you or does anyone in your family have a serious bleeding problem such as prolonged bleeding following surgeries or cuts? No   10. Have you ever had problems with anemia or been told to take iron pills? No   11. Have you had any abnormal blood loss such as black, tarry or bloody stools, or abnormal vaginal bleeding? No   12. Have you ever had a blood transfusion? No   13. Have you or any of your relatives ever had problems with  anesthesia? No   14. Do you have sleep apnea, excessive snoring or daytime drowsiness? No   15. Do you have any prosthetic heart valves? No   16. Do you have prosthetic joints? No   17. Is there any chance that you may be pregnant? No           HPI:                                                      Brief HPI related to upcoming procedure: Heavy periods and painful cramping since puberty. Was managed well with Depo shot for over 10 years, but does not want to remain on depo shot anymore. Still have painful cramping currently.       See problem list for active medical problems.  Problems all longstanding and stable, except as noted/documented.  See ROS for pertinent symptoms related to these conditions.                                                                                                  .  DEPRESSION - Patient has a long history of Depression of moderate severity requiring medication for control with recent symptoms being gradually worsening..Current symptoms of depression include depressed mood, diminished interest or pleasure in activities, sleep disturbance, difficulty falling asleep.                                                                                                                                                                                    .    MEDICAL HISTORY:                                                    Patient Active Problem List    Diagnosis Date Noted     Papanicolaou smear of cervix with low grade squamous intraepithelial lesion (LGSIL) 07/07/2017     Priority: Medium     7/7/17 LSIL pap/+ HR HPV (not 16 or 18). Plan: colp bef 10/7/17.        Opioid dependence in remission (H) 08/02/2015     Priority: Medium     On suboxone treatement with Garland Rodriguez MD, [place].  (Noted in 2015).  Has been off suboxone since at least June of 2017. 8/9/2017        Anxiety attack 07/31/2015     Priority: Medium     Hypokalemia 06/23/2015     Priority: Medium     Atypical chest pain  06/23/2015     Priority: Medium     Tobacco abuse 06/23/2015     Priority: Medium     Hyperlipidemia LDL goal <100 01/29/2014     Priority: Medium     Ingrowing nail 01/09/2014     Priority: Medium     Anxiety 08/19/2013     Priority: Medium     GERD (gastroesophageal reflux disease) 07/28/2010     Priority: Medium     Takes omeprazole and metoclopramide       Restless legs 07/28/2010     Priority: Medium      Past Medical History:   Diagnosis Date     Abdominal pain, right lower quadrant 03/09/08    Admit. Discharged 03/10/08     Anxiety attack 7/31/2015     Dehydration      Gastric ulcer 7/31/2015     GERD (gastroesophageal reflux disease) 7/28/2010    Takes omeprazole and metoclopramide      Opioid dependence in remission (H) 8/2/2015     Other and unspecified ovarian cyst      Papanicolaou smear of cervix with low grade squamous intraepithelial lesion (LGSIL) 07/07/2017     Past Surgical History:   Procedure Laterality Date     BIOPSY CERVICAL, LOCAL EXCISION, SINGLE/MULTIPLE N/A 8/10/2017    Procedure: BIOPSY CERVICAL, LOCAL EXCISION, SINGLE/MULTIPLE;;  Surgeon: Michael Chandler MD;  Location: [facility] OR     COLPOSCOPY, BIOPSY, COMBINED N/A 8/10/2017    Procedure: COMBINED COLPOSCOPY, BIOPSY;  Colposcopy with Cervical Biopsies and Endometrial Biopsy, Exam with Ultrasound;  Surgeon: Michael Chandler MD;  Location: [facility] OR     ESOPHAGOSCOPY, GASTROSCOPY, DUODENOSCOPY (EGD), COMBINED N/A 4/17/2017    Procedure: COMBINED ESOPHAGOSCOPY, GASTROSCOPY, DUODENOSCOPY (EGD);  Surgeon: Ibrahima Esposito MD;  Location: [facility]     EXAM UNDER ANESTHESIA PELVIC N/A 8/10/2017    Procedure: EXAM UNDER ANESTHESIA PELVIC;;  Surgeon: Michael Chandler MD;  Location: [facility] OR     [facility] UGI ENDOSCOPY, SIMPLE EXAM  01/07/08     Current Outpatient Prescriptions   Medication Sig Dispense Refill     sertraline (ZOLOFT) 100 MG tablet Take 1.5 tablets (150 mg) by mouth daily 135 tablet 1     propranolol (INDERAL) 20 MG  tablet Take 1 tablet (20 mg) by mouth 2 times daily 180 tablet 1     QUEtiapine (SEROQUEL) 50 MG tablet TAKE ONE OR TWO TABLETS BY MOUTH NIGHTLY AS NEEDED 60 tablet 3     ondansetron (ZOFRAN) 4 MG tablet TAKE ONE TABLET BY MOUTH EVERY 6 HOURS AS NEEDED FOR NAUSEA OR VOMITING 18 tablet 10     LORazepam (ATIVAN) 1 MG tablet Take 0.5 tablets (0.5 mg) by mouth every 8 hours as needed for anxiety 20 tablet 1     atorvastatin (LIPITOR) 20 MG tablet Take 1 tablet (20 mg) by mouth daily 90 tablet 1     cetirizine (ZYRTEC) 10 MG tablet Take 1 tablet (10 mg) by mouth every evening (Patient not taking: Reported on 10/24/2017) 30 tablet 3     medroxyPROGESTERone (DEPO-PROVERA) 150 MG/ML injection Inject 1 mL (150 mg) into the muscle every 3 months 3 mL 3     order for DME Blood pressure cuff 1 each 0     omeprazole 20 MG tablet Take 1 tablet (20 mg) by mouth 2 times daily Take 30-60 minutes before a meal. 60 tablet 3     sucralfate (CARAFATE) 1 GM tablet Take 1 tablet (1 g) by mouth 4 times daily 120 tablet 1     OTC products: tylenol as needed     Allergies   Allergen Reactions     Cafergot      12-            GI problems-     Compazine      Droperidol      Nubain [Nalbuphine Hcl]      Seasonal Allergies      Sumatriptan      vomits after giving herself a shot     Prochlorperazine Palpitations     Uncontrolled movement      Latex Allergy: NO    Social History   Substance Use Topics     Smoking status: Current Every Day Smoker     Packs/day: 0.25     Years: 20.00     Types: Cigarettes     Smokeless tobacco: Never Used      Comment: 5-6 cigs daily     Alcohol use Yes      Comment: occasional drinks     History   Drug Use No       REVIEW OF SYSTEMS:                                                    C: NEGATIVE for fever, chills, change in weight. Endorses headache for last three days, taking tylenol with no relief. Hx of migraines.   I: NEGATIVE for worrisome rashes, moles or lesions  E: NEGATIVE for vision changes or  "irritation  E/M: NEGATIVE for ear, mouth and throat problems  R: NEGATIVE for significant cough or SOB  B: NEGATIVE for masses, tenderness or discharge  CV: NEGATIVE for chest pain, palpitations or peripheral edema  GI: NEGATIVE for nausea, abdominal pain, heartburn, or change in bowel habits  : NEGATIVE for frequency, dysuria, or hematuria  M: NEGATIVE for significant arthralgias or myalgia  N: NEGATIVE for weakness, dizziness or paresthesias  E: NEGATIVE for temperature intolerance, skin/hair changes  H: NEGATIVE for bleeding problems  PSYCHIATRIC: POSITIVE for depressed mood, HX anxiety and HX depression    EXAM:                                                    /84 (BP Location: Left arm, Patient Position: Chair, Cuff Size: Adult Regular)  Pulse 112  Temp 98.2  F (36.8  C) (Temporal)  Resp 12  Ht 5' 5\" (1.651 m)  Wt 154 lb 12.8 oz (70.2 kg)  BMI 25.76 kg/m2    GENERAL APPEARANCE: healthy, alert and no distress     EYES: EOMI, PERRL     HENT: ear canals and TM's normal and nose and mouth without ulcers or lesions     NECK: no adenopathy, no asymmetry, masses, or scars and thyroid normal to palpation     RESP: lungs clear to auscultation - no rales, rhonchi or wheezes     CV: tachycardic, regular rhythm, normal S1 S2, no S3 or S4 and no murmur, click or rub     ABDOMEN:  soft, nontender, no HSM or masses and bowel sounds normal     MS: extremities normal- no gross deformities noted, no evidence of inflammation in joints, FROM in all extremities.     SKIN: no suspicious lesions or rashes     NEURO: Normal strength and tone, sensory exam grossly normal, mentation intact and speech normal     PSYCH: mentation appears normal and anxious     LYMPHATICS: No axillary, cervical, or supraclavicular nodes    DIAGNOSTICS:                                                    EKG: Not indicated due to non-vascular surgery and low risk of event (age <65 and without cardiac risk factors)    Results for orders placed " "or performed during the hospital encounter of 08/10/17   Surgical pathology exam   Result Value Ref Range    Copath Report       Patient Name: JOBY RAMON  MR#: [id number]  Specimen #: [id number]  Collected: 8/10/2017  Received: 8/11/2017  Reported: 8/14/2017 12:52  Ordering Phy(s): LATOSHA ESCALONA    For improved result formatting, select 'View Enhanced Report Format'  under Linked Documents section.    SPECIMEN(S):  A: Endometrial curettings  B: Cervical biopsy, 9 o'clock  C: Cervical biopsy, 2 o'clock  D: Cervical biopsy, 7 o'clock    FINAL DIAGNOSIS:  A. Cervix, endometrial curettings:  - Cervical glandular and squamous epithelium with no evidence of  dysplasia or malignancy.    B. Cervix, 9 o'clock, biopsy:  - Focal squamous atypia; cannot exclude koilocytosis.  - No evidence of high grade squamous intraepithelial lesion or  malignancy.  - No cervical glandular mucosa seen.    C. Cervix, 2 o'clock, biopsy:  - Squamous mucosa with no evidence of dysplasia or malignancy.  - No cervical glandular mucosa seen.    D. Cervix, 7 o'clock, biopsy:  - Squamous mucosa with no evidence of dysplasia or m alignancy.  - A very small area of cervical glandular mucosa with no diagnostic  alterations.    COMMENT:  The previous Pap smear [id number] was reviewed and the diagnosis of LSIL  was confirmed.    Electronically signed out by:    Charles Holland M.D., PhD    CLINICAL HISTORY:  41 year old female.    GROSS:  A. The specimen is received in formalin, labeled with the patient's name  and date of birth, and designated \"endometrial curettings\". It consists  of a 2.5 x 1.5 x 0.4 cm aggregate of red-brown, hemorrhagic soft tissue  fragments admixed with mucus.  The specimen is filtered over lens paper,  wrapped, and entirely submitted in one cassette.    B. The specimen is received in formalin, labeled with the patient's name  and date of birth, and designated \"cervical biopsy 9:00\". It consists of  two unoriented " "fragments of tan-white soft tissue admixed with a scant  amount of mucus, measuring 0.3 cm and 0.4 cm in greatest dimension.  Entirely submitted in one cassette.    C. The specimen is  received in formalin, labeled with the patient's name  and date of birth, and designated \"cervical biopsy 2:00\". It consists of  one fragment of tan-white soft tissue admixed with a scant amount of  mucus, measuring up to 0.3 cm in greatest dimension.  Entirely submitted  in one cassette.    D. The specimen is received in formalin, labeled with the patient's name  and date of birth, and designated \"cervical biopsy 7:00\". It consists of  one fragment of tan-white soft tissue admixed with a scant amount of  mucus, measuring up to 0.3 cm in greatest dimension.  The specimen is  inked blue as an identification marker.  Entirely submitted in one  cassette. (Dictated by: Nayla MANSFIELD 8/11/2017 09:48 AM)    MICROSCOPIC:  Microscopic examination is performed.    CPT Codes:  A: 99201-RZ6  B: 24881-DH6  C: 24384-JR9  D: 06570-AH6    TESTING LAB LOCATION:  [address] [address]  [phone]    COLLECTION SI TE:  Client: [facility]  Location: [facility] ([facility])          Recent Labs   Lab Test  08/10/17   1228  03/24/17   0803  10/02/15   0823   HGB   --   14.5  14.4   PLT   --   226  207   INR   --   0.87   --    NA  142  144  135   POTASSIUM  4.1  3.8  3.9   CR  0.67  0.68  0.60        IMPRESSION:                                                    Reason for surgery/procedure: dysmenorrhea, suspected adenomyosis, cervical dysplasia  Diagnosis/reason for consult: perioperative risk evaluation for laparoscopic hysterectomy    The proposed surgical procedure is considered INTERMEDIATE risk.    REVISED CARDIAC RISK INDEX  The patient has the following serious cardiovascular risks for perioperative complications such as (MI, PE, VFib and 3  AV Block):  No " serious cardiac risks  INTERPRETATION: 0 risks: Class I (very low risk - 0.4% complication rate)    The patient has the following additional risks for perioperative complications:  The 10-year ASCVD risk score (Ellie BREWER Jr, et al., 2013) is: 25%    Values used to calculate the score:      Age: 41 years      Sex: Female      Is Non- : No      Diabetic: No      Tobacco smoker: Yes      Systolic Blood Pressure: 155 mmHg      Is BP treated: Yes      HDL Cholesterol: 30 mg/dL      Total Cholesterol: 287 mg/dL  Delirium or Dementia      ICD-10-CM    1. Preop general physical exam Z01.818    2. Dysmenorrhea N94.6    3. Gastroesophageal reflux disease, esophagitis presence not specified K21.9    4. Anxiety F41.9 sertraline (ZOLOFT) 100 MG tablet     propranolol (INDERAL) 20 MG tablet     QUEtiapine (SEROQUEL) 50 MG tablet     LORazepam (ATIVAN) 1 MG tablet   5. Hyperlipidemia LDL goal <100 E78.5    6. Opioid dependence in remission (H) F11.21    7. Tobacco abuse Z72.0    8. Sinus tachycardia R00.0 propranolol (INDERAL) 20 MG tablet   9. Psychophysiological insomnia F51.04 QUEtiapine (SEROQUEL) 50 MG tablet   10. Anxiety attack F41.0 ondansetron (ZOFRAN) 4 MG tablet   11. Need for prophylactic vaccination and inoculation against influenza Z23 FLU VAC, SPLIT VIRUS IM > 3 YO (QUADRIVALENT) [54079]     Vaccine Administration, Initial [82537]       RECOMMENDATIONS:                                                        Cardiovascular Risk  Performs 4 METs exercise without symptoms (Climb a flight of stairs) .       Pulmonary Risk  Incentive spirometry post op  Respiratory Therapy (Respiratory Care IP Consult)  post op  NG tube decompression if abdominal distension or significant vomiting   Advised smoking cessation.           --Patient is to take all scheduled medications on the day of surgery EXCEPT for modifications listed below.    APPROVAL GIVEN to proceed with proposed procedure, without further  diagnostic evaluation       Signed Electronically by: Clemente Chino MD, MD    Copy of this evaluation report is provided to requesting physician.    [facility] Hospital[facility] GuidelinesAnswers for HPI/ROS submitted by the patient on 10/16/2017   If you checked off any problems, how difficult have these problems made it for you to do your work, take care of things at home, or get along with other people?: Very difficult  PHQ9 TOTAL SCORE: 9  BO 7 TOTAL SCORE: 11

## 2017-10-29 ENCOUNTER — ANESTHESIA EVENT (OUTPATIENT)
Dept: SURGERY | Facility: CLINIC | Age: 42
End: 2017-10-29
Payer: COMMERCIAL

## 2017-10-29 DIAGNOSIS — Z01.812 PRE-PROCEDURE LAB EXAM: ICD-10-CM

## 2017-10-29 LAB
ABO + RH BLD: NORMAL
ABO + RH BLD: NORMAL
B-HCG SERPL-ACNC: 1 IU/L (ref 0–5)
BASOPHILS # BLD AUTO: 0 10E9/L (ref 0–0.2)
BASOPHILS NFR BLD AUTO: 0.3 %
BLD GP AB SCN SERPL QL: NORMAL
BLOOD BANK CMNT PATIENT-IMP: NORMAL
CREAT SERPL-MCNC: 0.68 MG/DL (ref 0.52–1.04)
DIFFERENTIAL METHOD BLD: NORMAL
EOSINOPHIL # BLD AUTO: 0.2 10E9/L (ref 0–0.7)
EOSINOPHIL NFR BLD AUTO: 1.7 %
ERYTHROCYTE [DISTWIDTH] IN BLOOD BY AUTOMATED COUNT: 12.5 % (ref 10–15)
GFR SERPL CREATININE-BSD FRML MDRD: >90 ML/MIN/1.7M2
HCT VFR BLD AUTO: 41.4 % (ref 35–47)
HGB BLD-MCNC: 13.9 G/DL (ref 11.7–15.7)
IMM GRANULOCYTES # BLD: 0 10E9/L (ref 0–0.4)
IMM GRANULOCYTES NFR BLD: 0.2 %
LYMPHOCYTES # BLD AUTO: 2.4 10E9/L (ref 0.8–5.3)
LYMPHOCYTES NFR BLD AUTO: 25.3 %
MCH RBC QN AUTO: 31.8 PG (ref 26.5–33)
MCHC RBC AUTO-ENTMCNC: 33.6 G/DL (ref 31.5–36.5)
MCV RBC AUTO: 95 FL (ref 78–100)
MONOCYTES # BLD AUTO: 0.4 10E9/L (ref 0–1.3)
MONOCYTES NFR BLD AUTO: 4.5 %
NEUTROPHILS # BLD AUTO: 6.5 10E9/L (ref 1.6–8.3)
NEUTROPHILS NFR BLD AUTO: 68 %
PLATELET # BLD AUTO: 210 10E9/L (ref 150–450)
RBC # BLD AUTO: 4.37 10E12/L (ref 3.8–5.2)
SPECIMEN EXP DATE BLD: NORMAL
WBC # BLD AUTO: 9.5 10E9/L (ref 4–11)

## 2017-10-29 PROCEDURE — 85025 COMPLETE CBC W/AUTO DIFF WBC: CPT | Performed by: OBSTETRICS & GYNECOLOGY

## 2017-10-29 PROCEDURE — 84702 CHORIONIC GONADOTROPIN TEST: CPT | Performed by: OBSTETRICS & GYNECOLOGY

## 2017-10-29 PROCEDURE — 82565 ASSAY OF CREATININE: CPT | Performed by: OBSTETRICS & GYNECOLOGY

## 2017-10-29 PROCEDURE — 86900 BLOOD TYPING SEROLOGIC ABO: CPT | Performed by: OBSTETRICS & GYNECOLOGY

## 2017-10-29 PROCEDURE — 36415 COLL VENOUS BLD VENIPUNCTURE: CPT | Performed by: OBSTETRICS & GYNECOLOGY

## 2017-10-29 PROCEDURE — 86901 BLOOD TYPING SEROLOGIC RH(D): CPT | Performed by: OBSTETRICS & GYNECOLOGY

## 2017-10-29 PROCEDURE — 86850 RBC ANTIBODY SCREEN: CPT | Performed by: OBSTETRICS & GYNECOLOGY

## 2017-10-29 ASSESSMENT — LIFESTYLE VARIABLES: TOBACCO_USE: 1

## 2017-10-30 ENCOUNTER — ANESTHESIA (OUTPATIENT)
Dept: SURGERY | Facility: CLINIC | Age: 42
End: 2017-10-30
Payer: COMMERCIAL

## 2017-10-30 ENCOUNTER — HOSPITAL ENCOUNTER (OUTPATIENT)
Facility: CLINIC | Age: 42
Discharge: HOME | End: 2017-10-30
Attending: OBSTETRICS & GYNECOLOGY | Admitting: OBSTETRICS & GYNECOLOGY
Payer: COMMERCIAL

## 2017-10-30 ENCOUNTER — SURGERY (OUTPATIENT)
Age: 42
End: 2017-10-30
Payer: COMMERCIAL

## 2017-10-30 VITALS
DIASTOLIC BLOOD PRESSURE: 75 MMHG | OXYGEN SATURATION: 95 % | SYSTOLIC BLOOD PRESSURE: 139 MMHG | HEART RATE: 86 BPM | HEIGHT: 65 IN | BODY MASS INDEX: 25.83 KG/M2 | RESPIRATION RATE: 9 BRPM | WEIGHT: 155 LBS | TEMPERATURE: 97.9 F

## 2017-10-30 DIAGNOSIS — Z90.710 S/P HYSTERECTOMY: Primary | ICD-10-CM

## 2017-10-30 PROCEDURE — 25000128 H RX IP 250 OP 636: Performed by: NURSE ANESTHETIST, CERTIFIED REGISTERED

## 2017-10-30 PROCEDURE — 40000306 ZZH STATISTIC PRE PROC ASSESS II: Performed by: OBSTETRICS & GYNECOLOGY

## 2017-10-30 PROCEDURE — 58571 TLH W/T/O 250 G OR LESS: CPT | Performed by: OBSTETRICS & GYNECOLOGY

## 2017-10-30 PROCEDURE — 25000125 ZZHC RX 250: Performed by: OBSTETRICS & GYNECOLOGY

## 2017-10-30 PROCEDURE — 25000566 ZZH SEVOFLURANE, EA 15 MIN: Performed by: OBSTETRICS & GYNECOLOGY

## 2017-10-30 PROCEDURE — 71000015 ZZH RECOVERY PHASE 1 LEVEL 2 EA ADDTL HR: Performed by: OBSTETRICS & GYNECOLOGY

## 2017-10-30 PROCEDURE — 71000014 ZZH RECOVERY PHASE 1 LEVEL 2 FIRST HR: Performed by: OBSTETRICS & GYNECOLOGY

## 2017-10-30 PROCEDURE — 36000063 ZZH SURGERY LEVEL 4 EA 15 ADDTL MIN: Performed by: OBSTETRICS & GYNECOLOGY

## 2017-10-30 PROCEDURE — 25000125 ZZHC RX 250: Performed by: NURSE ANESTHETIST, CERTIFIED REGISTERED

## 2017-10-30 PROCEDURE — 36000093 ZZH SURGERY LEVEL 4 1ST 30 MIN: Performed by: OBSTETRICS & GYNECOLOGY

## 2017-10-30 PROCEDURE — 27110028 ZZH OR GENERAL SUPPLY NON-STERILE: Performed by: OBSTETRICS & GYNECOLOGY

## 2017-10-30 PROCEDURE — 88309 TISSUE EXAM BY PATHOLOGIST: CPT | Mod: 26 | Performed by: OBSTETRICS & GYNECOLOGY

## 2017-10-30 PROCEDURE — 27210794 ZZH OR GENERAL SUPPLY STERILE: Performed by: OBSTETRICS & GYNECOLOGY

## 2017-10-30 PROCEDURE — 25000132 ZZH RX MED GY IP 250 OP 250 PS 637: Performed by: NURSE ANESTHETIST, CERTIFIED REGISTERED

## 2017-10-30 PROCEDURE — 25000128 H RX IP 250 OP 636: Performed by: OBSTETRICS & GYNECOLOGY

## 2017-10-30 PROCEDURE — 37000008 ZZH ANESTHESIA TECHNICAL FEE, 1ST 30 MIN: Performed by: OBSTETRICS & GYNECOLOGY

## 2017-10-30 PROCEDURE — 88309 TISSUE EXAM BY PATHOLOGIST: CPT | Performed by: OBSTETRICS & GYNECOLOGY

## 2017-10-30 PROCEDURE — 71000027 ZZH RECOVERY PHASE 2 EACH 15 MINS: Performed by: OBSTETRICS & GYNECOLOGY

## 2017-10-30 PROCEDURE — 37000009 ZZH ANESTHESIA TECHNICAL FEE, EACH ADDTL 15 MIN: Performed by: OBSTETRICS & GYNECOLOGY

## 2017-10-30 RX ORDER — ACETAMINOPHEN 10 MG/ML
INJECTION, SOLUTION INTRAVENOUS PRN
Status: DISCONTINUED | OUTPATIENT
Start: 2017-10-30 | End: 2017-10-30

## 2017-10-30 RX ORDER — ONDANSETRON 4 MG/1
4 TABLET, ORALLY DISINTEGRATING ORAL EVERY 30 MIN PRN
Status: DISCONTINUED | OUTPATIENT
Start: 2017-10-30 | End: 2017-10-30 | Stop reason: HOSPADM

## 2017-10-30 RX ORDER — ONDANSETRON 2 MG/ML
4 INJECTION INTRAMUSCULAR; INTRAVENOUS EVERY 30 MIN PRN
Status: DISCONTINUED | OUTPATIENT
Start: 2017-10-30 | End: 2017-10-30 | Stop reason: HOSPADM

## 2017-10-30 RX ORDER — LIDOCAINE 40 MG/G
CREAM TOPICAL
Status: DISCONTINUED | OUTPATIENT
Start: 2017-10-30 | End: 2017-10-30 | Stop reason: HOSPADM

## 2017-10-30 RX ORDER — GLYCOPYRROLATE 0.2 MG/ML
INJECTION, SOLUTION INTRAMUSCULAR; INTRAVENOUS PRN
Status: DISCONTINUED | OUTPATIENT
Start: 2017-10-30 | End: 2017-10-30

## 2017-10-30 RX ORDER — BUPIVACAINE HYDROCHLORIDE AND EPINEPHRINE 2.5; 5 MG/ML; UG/ML
INJECTION, SOLUTION INFILTRATION; PERINEURAL PRN
Status: DISCONTINUED | OUTPATIENT
Start: 2017-10-30 | End: 2017-10-30 | Stop reason: HOSPADM

## 2017-10-30 RX ORDER — NALOXONE HYDROCHLORIDE 0.4 MG/ML
.1-.4 INJECTION, SOLUTION INTRAMUSCULAR; INTRAVENOUS; SUBCUTANEOUS
Status: DISCONTINUED | OUTPATIENT
Start: 2017-10-30 | End: 2017-10-30 | Stop reason: HOSPADM

## 2017-10-30 RX ORDER — AMOXICILLIN 250 MG
1-2 CAPSULE ORAL DAILY PRN
Qty: 30 TABLET | Refills: 3 | Status: SHIPPED | OUTPATIENT
Start: 2017-10-30 | End: 2018-11-20

## 2017-10-30 RX ORDER — CEFAZOLIN SODIUM 1 G/3ML
1 INJECTION, POWDER, FOR SOLUTION INTRAMUSCULAR; INTRAVENOUS SEE ADMIN INSTRUCTIONS
Status: DISCONTINUED | OUTPATIENT
Start: 2017-10-30 | End: 2017-10-30 | Stop reason: HOSPADM

## 2017-10-30 RX ORDER — LIDOCAINE HYDROCHLORIDE 20 MG/ML
INJECTION, SOLUTION INFILTRATION; PERINEURAL PRN
Status: DISCONTINUED | OUTPATIENT
Start: 2017-10-30 | End: 2017-10-30

## 2017-10-30 RX ORDER — FENTANYL CITRATE 50 UG/ML
25-50 INJECTION, SOLUTION INTRAMUSCULAR; INTRAVENOUS
Status: DISCONTINUED | OUTPATIENT
Start: 2017-10-30 | End: 2017-10-30 | Stop reason: HOSPADM

## 2017-10-30 RX ORDER — DEXAMETHASONE SODIUM PHOSPHATE 10 MG/ML
INJECTION, SOLUTION INTRAMUSCULAR; INTRAVENOUS PRN
Status: DISCONTINUED | OUTPATIENT
Start: 2017-10-30 | End: 2017-10-30

## 2017-10-30 RX ORDER — HYDROMORPHONE HYDROCHLORIDE 1 MG/ML
.3-.5 INJECTION, SOLUTION INTRAMUSCULAR; INTRAVENOUS; SUBCUTANEOUS EVERY 10 MIN PRN
Status: DISCONTINUED | OUTPATIENT
Start: 2017-10-30 | End: 2017-10-30 | Stop reason: HOSPADM

## 2017-10-30 RX ORDER — SUCRALFATE 1 G/1
TABLET ORAL
Qty: 120 TABLET | Refills: 0 | Status: SHIPPED | OUTPATIENT
Start: 2017-10-30 | End: 2018-01-25

## 2017-10-30 RX ORDER — OXYCODONE AND ACETAMINOPHEN 5; 325 MG/1; MG/1
1-2 TABLET ORAL EVERY 6 HOURS PRN
Qty: 40 TABLET | Refills: 0 | Status: SHIPPED | OUTPATIENT
Start: 2017-10-30 | End: 2017-12-21

## 2017-10-30 RX ORDER — CEFAZOLIN SODIUM 2 G/100ML
2 INJECTION, SOLUTION INTRAVENOUS
Status: COMPLETED | OUTPATIENT
Start: 2017-10-30 | End: 2017-10-30

## 2017-10-30 RX ORDER — NEOSTIGMINE METHYLSULFATE 1 MG/ML
VIAL (ML) INJECTION PRN
Status: DISCONTINUED | OUTPATIENT
Start: 2017-10-30 | End: 2017-10-30

## 2017-10-30 RX ORDER — MEPERIDINE HYDROCHLORIDE 50 MG/ML
12.5 INJECTION INTRAMUSCULAR; INTRAVENOUS; SUBCUTANEOUS
Status: DISCONTINUED | OUTPATIENT
Start: 2017-10-30 | End: 2017-10-30 | Stop reason: HOSPADM

## 2017-10-30 RX ORDER — SODIUM CHLORIDE, SODIUM LACTATE, POTASSIUM CHLORIDE, CALCIUM CHLORIDE 600; 310; 30; 20 MG/100ML; MG/100ML; MG/100ML; MG/100ML
INJECTION, SOLUTION INTRAVENOUS CONTINUOUS
Status: DISCONTINUED | OUTPATIENT
Start: 2017-10-30 | End: 2017-10-30 | Stop reason: HOSPADM

## 2017-10-30 RX ORDER — PHENAZOPYRIDINE HYDROCHLORIDE 200 MG/1
200 TABLET, FILM COATED ORAL 3 TIMES DAILY PRN
Qty: 6 TABLET | Refills: 0 | Status: SHIPPED | OUTPATIENT
Start: 2017-10-30 | End: 2017-11-24

## 2017-10-30 RX ORDER — PROPOFOL 10 MG/ML
INJECTION, EMULSION INTRAVENOUS PRN
Status: DISCONTINUED | OUTPATIENT
Start: 2017-10-30 | End: 2017-10-30

## 2017-10-30 RX ORDER — LIDOCAINE HYDROCHLORIDE AND EPINEPHRINE 10; 10 MG/ML; UG/ML
INJECTION, SOLUTION INFILTRATION; PERINEURAL PRN
Status: DISCONTINUED | OUTPATIENT
Start: 2017-10-30 | End: 2017-10-30 | Stop reason: HOSPADM

## 2017-10-30 RX ORDER — ONDANSETRON 2 MG/ML
INJECTION INTRAMUSCULAR; INTRAVENOUS PRN
Status: DISCONTINUED | OUTPATIENT
Start: 2017-10-30 | End: 2017-10-30

## 2017-10-30 RX ORDER — FENTANYL CITRATE 50 UG/ML
INJECTION, SOLUTION INTRAMUSCULAR; INTRAVENOUS PRN
Status: DISCONTINUED | OUTPATIENT
Start: 2017-10-30 | End: 2017-10-30

## 2017-10-30 RX ORDER — OXYCODONE HYDROCHLORIDE 5 MG/1
5-10 TABLET ORAL EVERY 4 HOURS PRN
Status: DISCONTINUED | OUTPATIENT
Start: 2017-10-30 | End: 2017-10-30 | Stop reason: HOSPADM

## 2017-10-30 RX ADMIN — PHENYLEPHRINE HYDROCHLORIDE 100 MCG: 10 INJECTION, SOLUTION INTRAMUSCULAR; INTRAVENOUS; SUBCUTANEOUS at 08:07

## 2017-10-30 RX ADMIN — SODIUM CHLORIDE, POTASSIUM CHLORIDE, SODIUM LACTATE AND CALCIUM CHLORIDE: 600; 310; 30; 20 INJECTION, SOLUTION INTRAVENOUS at 08:15

## 2017-10-30 RX ADMIN — FENTANYL CITRATE 50 MCG: 50 INJECTION, SOLUTION INTRAMUSCULAR; INTRAVENOUS at 08:06

## 2017-10-30 RX ADMIN — Medication 3.5 MG: at 09:42

## 2017-10-30 RX ADMIN — LIDOCAINE HYDROCHLORIDE 100 MG: 20 INJECTION, SOLUTION INFILTRATION; PERINEURAL at 08:06

## 2017-10-30 RX ADMIN — ROCURONIUM BROMIDE 10 MG: 10 INJECTION INTRAVENOUS at 09:17

## 2017-10-30 RX ADMIN — FENTANYL CITRATE 50 MCG: 50 INJECTION, SOLUTION INTRAMUSCULAR; INTRAVENOUS at 08:27

## 2017-10-30 RX ADMIN — Medication 4 MCG: at 09:45

## 2017-10-30 RX ADMIN — LIDOCAINE HYDROCHLORIDE 1 ML: 10 INJECTION, SOLUTION EPIDURAL; INFILTRATION; INTRACAUDAL; PERINEURAL at 07:43

## 2017-10-30 RX ADMIN — ROCURONIUM BROMIDE 10 MG: 10 INJECTION INTRAVENOUS at 08:40

## 2017-10-30 RX ADMIN — ONDANSETRON 4 MG: 2 INJECTION INTRAMUSCULAR; INTRAVENOUS at 09:38

## 2017-10-30 RX ADMIN — GLYCOPYRROLATE 0.6 MG: 0.2 INJECTION, SOLUTION INTRAMUSCULAR; INTRAVENOUS at 09:42

## 2017-10-30 RX ADMIN — FENTANYL CITRATE 50 MCG: 50 INJECTION, SOLUTION INTRAMUSCULAR; INTRAVENOUS at 11:06

## 2017-10-30 RX ADMIN — FENTANYL CITRATE 50 MCG: 50 INJECTION, SOLUTION INTRAMUSCULAR; INTRAVENOUS at 08:00

## 2017-10-30 RX ADMIN — MEPERIDINE HYDROCHLORIDE 12.5 MG: 50 INJECTION, SOLUTION INTRAMUSCULAR; INTRAVENOUS; SUBCUTANEOUS at 10:36

## 2017-10-30 RX ADMIN — ONDANSETRON 4 MG: 2 INJECTION INTRAMUSCULAR; INTRAVENOUS at 10:58

## 2017-10-30 RX ADMIN — Medication 2 MCG: at 09:50

## 2017-10-30 RX ADMIN — FENTANYL CITRATE 50 MCG: 50 INJECTION, SOLUTION INTRAMUSCULAR; INTRAVENOUS at 08:37

## 2017-10-30 RX ADMIN — ATROPA BELLADONNA AND OPIUM 30 MG: 16.2; 3 SUPPOSITORY RECTAL at 09:44

## 2017-10-30 RX ADMIN — Medication 2 MCG: at 09:51

## 2017-10-30 RX ADMIN — Medication 4 MCG: at 10:40

## 2017-10-30 RX ADMIN — BUPIVACAINE HYDROCHLORIDE AND EPINEPHRINE BITARTRATE 4 ML: 2.5; .005 INJECTION, SOLUTION INFILTRATION; PERINEURAL at 09:44

## 2017-10-30 RX ADMIN — SODIUM CHLORIDE, POTASSIUM CHLORIDE, SODIUM LACTATE AND CALCIUM CHLORIDE: 600; 310; 30; 20 INJECTION, SOLUTION INTRAVENOUS at 11:22

## 2017-10-30 RX ADMIN — CEFAZOLIN SODIUM 2 G: 2 INJECTION, SOLUTION INTRAVENOUS at 08:12

## 2017-10-30 RX ADMIN — ACETAMINOPHEN 1000 MG: 10 INJECTION, SOLUTION INTRAVENOUS at 08:25

## 2017-10-30 RX ADMIN — DEXAMETHASONE SODIUM PHOSPHATE 10 MG: 10 INJECTION, SOLUTION INTRAMUSCULAR; INTRAVENOUS at 08:18

## 2017-10-30 RX ADMIN — FENTANYL CITRATE 50 MCG: 50 INJECTION, SOLUTION INTRAMUSCULAR; INTRAVENOUS at 12:45

## 2017-10-30 RX ADMIN — HYDROMORPHONE HYDROCHLORIDE 0.5 MG: 1 INJECTION, SOLUTION INTRAMUSCULAR; INTRAVENOUS; SUBCUTANEOUS at 10:49

## 2017-10-30 RX ADMIN — Medication 4 MCG: at 10:24

## 2017-10-30 RX ADMIN — MIDAZOLAM HYDROCHLORIDE 2 MG: 1 INJECTION, SOLUTION INTRAMUSCULAR; INTRAVENOUS at 07:58

## 2017-10-30 RX ADMIN — OXYCODONE HYDROCHLORIDE 10 MG: 5 TABLET ORAL at 11:44

## 2017-10-30 RX ADMIN — ROCURONIUM BROMIDE 40 MG: 10 INJECTION INTRAVENOUS at 08:07

## 2017-10-30 RX ADMIN — HYDROMORPHONE HYDROCHLORIDE 0.5 MG: 1 INJECTION, SOLUTION INTRAMUSCULAR; INTRAVENOUS; SUBCUTANEOUS at 10:18

## 2017-10-30 RX ADMIN — PROPOFOL 200 MG: 10 INJECTION, EMULSION INTRAVENOUS at 08:06

## 2017-10-30 RX ADMIN — FLUORESCEIN SODIUM 100 MG: 100 INJECTION, SOLUTION OPHTHALMIC at 09:32

## 2017-10-30 RX ADMIN — SODIUM CHLORIDE, POTASSIUM CHLORIDE, SODIUM LACTATE AND CALCIUM CHLORIDE: 600; 310; 30; 20 INJECTION, SOLUTION INTRAVENOUS at 09:36

## 2017-10-30 RX ADMIN — HYDROMORPHONE HYDROCHLORIDE 0.5 MG: 1 INJECTION, SOLUTION INTRAMUSCULAR; INTRAVENOUS; SUBCUTANEOUS at 09:39

## 2017-10-30 RX ADMIN — FENTANYL CITRATE 50 MCG: 50 INJECTION, SOLUTION INTRAMUSCULAR; INTRAVENOUS at 10:29

## 2017-10-30 RX ADMIN — FENTANYL CITRATE 50 MCG: 50 INJECTION, SOLUTION INTRAMUSCULAR; INTRAVENOUS at 11:15

## 2017-10-30 RX ADMIN — FENTANYL CITRATE 50 MCG: 50 INJECTION, SOLUTION INTRAMUSCULAR; INTRAVENOUS at 10:15

## 2017-10-30 RX ADMIN — SODIUM CHLORIDE, POTASSIUM CHLORIDE, SODIUM LACTATE AND CALCIUM CHLORIDE: 600; 310; 30; 20 INJECTION, SOLUTION INTRAVENOUS at 07:43

## 2017-10-30 RX ADMIN — LIDOCAINE HYDROCHLORIDE AND EPINEPHRINE 7 ML: 10; 10 INJECTION, SOLUTION INFILTRATION; PERINEURAL at 09:45

## 2017-10-30 NOTE — ANESTHESIA PREPROCEDURE EVALUATION
Anesthesia Evaluation     . Pt has had prior anesthetic. Type: MAC    No history of anesthetic complications          ROS/MED HX    ENT/Pulmonary:     (+)tobacco use, Current use 1/2 packs/day  , . .    Neurologic:     (+)migraines,     Cardiovascular:     (+) Dyslipidemia, ----. : . . . :. . No previous cardiac testing       METS/Exercise Tolerance:  >4 METS   Hematologic:  - neg hematologic  ROS       Musculoskeletal:  - neg musculoskeletal ROS       GI/Hepatic:     (+) GERD Asymptomatic on medication,       Renal/Genitourinary:  - ROS Renal section negative       Endo:  - neg endo ROS       Psychiatric:     (+) psychiatric history anxiety and depression      Infectious Disease:  - neg infectious disease ROS       Malignancy:      - no malignancy   Other: Comment: Pt has a history of Opioid addiction 2015, off medication since 6-2017   (+) No chance of pregnancy C-spine cleared: N/A, H/O Chronic Pain,H/O chronic opiod use , no other significant disability                    Physical Exam  Normal systems: cardiovascular, pulmonary and dental    Airway   Mallampati: I  TM distance: >3 FB  Neck ROM: full    Dental     Cardiovascular   Rhythm and rate: regular and normal      Pulmonary    breath sounds clear to auscultation    Other findings:  Hard of Hearing Bilaterally (Uses bilateral HA)                Anesthesia Plan      History & Physical Review  History and physical reviewed and following examination; no interval change.    ASA Status:  2 .    NPO Status:  > 8 hours    Plan for General and ETT with Intravenous and Propofol induction. Maintenance will be Balanced.    PONV prophylaxis:  Ondansetron (or other 5HT-3) and Dexamethasone or Solumedrol       Postoperative Care  Postoperative pain management:  IV analgesics and Oral pain medications.      Consents  Anesthetic plan, risks, benefits and alternatives discussed with:  Patient.  Use of blood products discussed: Yes.   Use of blood products discussed with  Patient.  Consented to blood products.  .                          .

## 2017-10-30 NOTE — TELEPHONE ENCOUNTER
Routing refill request to provider for review/approval because:  A break in medication.  Rx was sent to pharmacy on 3/30/17 with a 3 month supply and one additional refill.  Pt should have been out a month ago.    Doretha Hernandez RN

## 2017-10-30 NOTE — OP NOTE
DATE OF PROCEDURE:  10/30/2017      PREOPERATIVE DIAGNOSIS:  Cervical dysplasia.      POSTOPERATIVE DIAGNOSIS:  Cervical dysplasia.      PROCEDURE:  Total laparoscopic hysterectomy and bilateral salpingectomy.      SURGEON:  Michael Chandler MD      ASSISTANT:  Tigre Lowry DO (the assistance of this surgeon was required due to the need for additional skilled surgical hands.)      SECOND ASSISTANT:  ASHOK Macias.      ANESTHESIA:  General.      PATIENT PROFILE:  Radha Beckwith is a 41-year-old female with cervical dysplasia and pelvic pain.      OPERATIVE FINDINGS:  Both ovaries looked normal, so they were retained as per her wishes.  The cystoscopy that was performed afterwards revealed that there was good flow of fluorescein dye through both ureteral orifices and the bladder was intact.  There were no lacerations.  No sutures were noted in the bladder.  See operative description for other details.      OPERATIVE DESCRIPTION:  After the establishment of satisfactory general endotracheal anesthesia, the patient was prepped and draped in the usual fashion for laparoscopy and vaginal surgery.  A Claudio catheter was inserted in the urinary bladder.  I made a 5 mm incision just above the umbilicus and inserted the Veress needle and insufflated the abdomen with CO2 gas.  Dr. Lowry then placed a 5 mm bladed trocar in that spot and we placed 2 more 5 mm bladed trocars on the right and left lower abdomen below the level of the umbilicus.  These were done under direct visualization.  I inserted the ZAFAR uterine manipulator in the vagina and then I returned to the laparoscopic portion.  I went across the mesosalpinx on the left fallopian tube while Dr. Lowry elevated the tube.  I used the LigaSure device all the way across the mesosalpinx and then across the utero-ovarian ligament and across the broad ligament and round ligament and all the way down to the lower uterine segment.  I then created a  bladder flap through the peritoneum with the LigaSure device.  I then sealed the uterine arteries on the left side in 3 successive locations along the lower uterine segments.  I had identified the course of the ureter along the left pelvis prior to doing this.  Dr. Lowry's assistance was necessary due to the need for additional skilled surgical hands.  He was able to elevate the tissues in such a way that I could employ the LigaSure device.  Dr. Lowry then employed the LigaSure device on the right side while I elevated the tissues so that he could visualize where to apply the LigaSure.  He went across the same applications of the mesosalpinx, the utero-ovarian ligament, the broad and round ligaments and down to the lower uterine segment.  He then sealed the uterine arteries in 3 successive spots as I had done on the right side.  We did identify the ureter on the right side as well.  At that point, I cut the colpotomy by going across the KOH cup on the uterine manipulator.  I used the L hook of the [employer] device.  I cut the 360-degree colpotomy with Dr. Lowry's assistance and then we irrigated the colpotomy and found that it was hemostatic.  At that point, the uterus was delivered vaginally and I inserted an Asepto bulb to inspect the pelvis one more time and irrigated and found that it was hemostatic.  At that point, I removed the Asepto bulb and I repaired the vaginal cuff vaginally with figure-of-eight 0 Vicryl sutures in each apex of the cuff and then I used a running locked suture across the center of the cuff.  I used 2 of these additional sutures.  I then returned to the laparoscopy and irrigated the pelvis.  Total irrigation was approximately 150 mL and 125 mL was recovered and accounted for.  The cuff was hemostatic and the pedicles were all hemostatic.  The ovaries looked normal.  At that point, I performed cystoscopy using fluorescein dye 1/3 of 1 mL.  There was good flow of fluorescein  through both ureteral orifices and the bladder was intact and there were no sutures or lacerations in the bladder.  At that point, I placed a B&O suppository and I did remove the trocars after expressing all the CO2 gas from the abdomen.  I repaired each of the trocar sites with 4-0 Vicryl subcuticular stitch and injected each of the sites with 0.25% Marcaine with dilute epinephrine.  A total of 7 mL was used and I also used 1% Xylocaine with epinephrine during the case, an additional 4 mL.  The estimated blood loss was 50 mL.  The final sponge, needle and instrument counts were reported to me as correct and the patient was taken to the recovery room in good condition without complications.         MICHAEL CHANDLER MD             D: 10/30/2017 09:59   T: 10/30/2017 11:37   MT:       Name:     JOBY RAMON   MRN:      [id number]        Account:        [id number]   :      1975           Procedure Date: 10/30/2017      Document: [id number]       cc: Michael Chandler MD

## 2017-10-30 NOTE — IP AVS SNAPSHOT
[facility]    [address] [place] [address] DR ROMIE BARRERA [address]    Phone:  [phone]                                       After Visit Summary   10/30/2017    Radha Beckwith    MRN: [id number]           After Visit Summary Signature Page     I have received my discharge instructions, and my questions have been answered. I have discussed any challenges I see with this plan with the nurse or doctor.    ..........................................................................................................................................  Patient/Patient Representative Signature      ..........................................................................................................................................  Patient Representative Print Name and Relationship to Patient    ..................................................               ................................................  Date                                            Time    ..........................................................................................................................................  Reviewed by Signature/Title    ...................................................              ..............................................  Date                                                            Time

## 2017-10-30 NOTE — DISCHARGE INSTRUCTIONS
Instructions from Dr Chandler after your Total  Laparoscopic  Hysterectomy:    1. Please make an appointment to be seen at his clinic within the next few days. The number to call is [phone] (his nurse) or the main number is [phone]. Also you will need to be seen 6 weeks after surgery for a final check before returning to usual activities.    2. If you should develop any concerns, such as heavy vaginal bleeding, fever, vomiting or increasing pain, you should call his nurse at the above number to be worked in for an appointment, or go to the emergency room if after hours or on a weekend.    3. You should expect some pain for the first several weeks which should gradually decrease day by day. Use your pain medication as needed. The narcotic medication will likely  make you constipated so be somewhat careful about using this. In general we expect that you should NOT be using the narcotic medication after the first week after surgery is over.Try to use the ibuprofen first and then the narcotic if you need more pain relief.    4. Expect that you might see some vaginal bleeding- mostly spotting- but perhaps you may pass a clot or two in the first several weeks after surgery- This can actually occur anytime in the first 3 months after this type of surgery. Mild clotting/spotting is ok but something like a heavy menstrual period should definitely be reported right away.. If the bleeding seems to be more than that, please let Dr Chandler know about it. Also watch for signs of infection, which would be fever or redness at the incision sites if there are any incisions, or foul smelling vaginal discharge.    5. Avoid intercourse until your 6 week post-operative exam. Don't lift anything over 20 lbs for 12  weeks after your surgery.    6. If you have any questions or concerns please don't hesitate to contact Dr Chandler.    7.You have been given a prescription for pyridium., These pills will turn your  urine bright orange. They help you with soothing the sting / burning with urination. You can take one pill up to three times a day- usually with meals- for up to 2 days. Usually the burning with urination resolves in the first day after surgery. Then you can stop the pills.          Michael Chandler MD, FACOG, FAAFP              , OB/GYN  Department.    IF BLEEDING OCCURS AT YOUR INCISION (ANYTHING MORE THAN THE SIZE OF A QUARTER) APPLY SLIGHT PRESSURE AND IF NEEDED APPLY A NEW GAUZE OR BAND-AID OVER EXISTING DRESSING. IF BLEEDING CONTINUES CALL NUMBER BELOW OR GO TO EMERGENCY ROOM.    [facility]   Adult Discharge Orders & Instructions     For 24 hours after surgery    1. Get plenty of rest.  A responsible adult must stay with you for at least 24 hours after you leave the hospital.   2. Do not drive or use heavy equipment.  If you have weakness or tingling, don't drive or use heavy equipment until this feeling goes away.  3. Do not drink alcohol.  4. Avoid strenuous or risky activities.  Ask for help when climbing stairs.   5. You may feel lightheaded.  IF so, sit for a few minutes before standing.  Have someone help you get up.   6. If you have nausea (feel sick to your stomach): Drink only clear liquids such as apple juice, ginger ale, broth or 7-Up.  Rest may also help.  Be sure to drink enough fluids.  Move to a regular diet as you feel able.  7. You may have a slight fever. Call the doctor if your fever is over 100 F (37.7 C) (taken under the tongue) or lasts longer than 24 hours.  8. You may have a dry mouth, a sore throat, muscle aches or trouble sleeping.  These should go away after 24 hours.  9. Do not make important or legal decisions.   Call your doctor for any of the followin.  Signs of infection (fever, growing tenderness at the surgery site, a large amount of drainage or bleeding, severe pain, foul-smelling drainage, redness, swelling).    2. It has been over 8 to 10  hours since surgery and you are still not able to urinate (pass water).    3.  Headache for over 24 hours.    To contact a doctor, call [phone]

## 2017-10-30 NOTE — ANESTHESIA POSTPROCEDURE EVALUATION
Patient: Radha Beckwith    Procedure(s):  LAPAROSCOPIC HYSTERECTOMY TOTAL POSSIBLE SALPINGO-OOPHERECTOMY (BILATERAL) - Wound Class: II-Clean Contaminated    Diagnosis:menorrhagia and pelvic pain, cervical dysplasia  Diagnosis Additional Information: No value filed.    Anesthesia Type:  General, ETT    Note:  Anesthesia Post Evaluation    Patient location during evaluation: Phase 2 and Bedside  Patient participation: Able to fully participate in evaluation  Level of consciousness: awake  Pain management: adequate  Airway patency: patent  Cardiovascular status: acceptable and hemodynamically stable  Respiratory status: acceptable, room air and nonlabored ventilation  Hydration status: stable  PONV: none     Anesthetic complications: None    Comments: Patient was happy with the anesthesia care received and no anesthesia related complications were noted.  I will follow up with the patient again if it is needed.        Last vitals:  Vitals:    10/30/17 1215 10/30/17 1230 10/30/17 1245   BP: 126/71 119/75 139/75   Pulse:      Resp:      Temp:      SpO2: 93% (!) 89% 95%         Electronically Signed By: JEANNIE Anand CRNA  October 30, 2017  1:39 PM

## 2017-10-30 NOTE — IP AVS SNAPSHOT
MRN:[id number]                      After Visit Summary   10/30/2017    Radha Beckwith    MRN: [id number]           Thank you!     Thank you for choosing [facility] for your care. Our goal is always to provide you with excellent care. Hearing back from our patients is one way we can continue to improve our services. Please take a few minutes to complete the written survey that you may receive in the mail after you visit with us. Thank you!        Patient Information     Date Of Birth          1975        About your hospital stay     You were admitted on:  October 30, 2017 You last received care in the:  [facility] OR    You were discharged on:  October 30, 2017       Who to Call     For medical emergencies, please call 911.  For non-urgent questions about your medical care, please call your primary care provider or clinic, [phone]  For questions related to your surgery, please call your surgery clinic        Attending Provider     Provider Specialty    Michael Chandler MD [facility]       Primary Care Provider Office Phone # Fax #    [facility] [place] [facility] [phone]      Your next 10 appointments already scheduled     Nov 02, 2017 11:10 AM CDT   [facility] with [facility] MD   [facility]    [address] [place] [id number] [phone]              Further instructions from your care team       Instructions from Dr Chandler after your Total  Laparoscopic  Hysterectomy:    1. Please make an appointment to be seen at his clinic within the next few days. The number to call is [phone] (his nurse) or the main number is [phone]. Also you will need to be seen 6 weeks after surgery for a final check before returning to usual activities.    2. If you should develop any concerns, such as heavy vaginal bleeding, fever, vomiting or increasing pain, you should  call his nurse at the above number to be worked in for an appointment, or go to the emergency room if after hours or on a weekend.    3. You should expect some pain for the first several weeks which should gradually decrease day by day. Use your pain medication as needed. The narcotic medication will likely  make you constipated so be somewhat careful about using this. In general we expect that you should NOT be using the narcotic medication after the first week after surgery is over.Try to use the ibuprofen first and then the narcotic if you need more pain relief.    4. Expect that you might see some vaginal bleeding- mostly spotting- but perhaps you may pass a clot or two in the first several weeks after surgery- This can actually occur anytime in the first 3 months after this type of surgery. Mild clotting/spotting is ok but something like a heavy menstrual period should definitely be reported right away.. If the bleeding seems to be more than that, please let Dr Chandler know about it. Also watch for signs of infection, which would be fever or redness at the incision sites if there are any incisions, or foul smelling vaginal discharge.    5. Avoid intercourse until your 6 week post-operative exam. Don't lift anything over 20 lbs for 12  weeks after your surgery.    6. If you have any questions or concerns please don't hesitate to contact Dr Chandler.    7.You have been given a prescription for pyridium., These pills will turn your urine bright orange. They help you with soothing the sting / burning with urination. You can take one pill up to three times a day- usually with meals- for up to 2 days. Usually the burning with urination resolves in the first day after surgery. Then you can stop the pills.          Michael Chandler MD, FACOG, FAAFP              , OB/GYN  Department.    IF BLEEDING OCCURS AT YOUR INCISION (ANYTHING MORE THAN THE SIZE OF A QUARTER) APPLY SLIGHT PRESSURE AND IF NEEDED APPLY A  NEW GAUZE OR BAND-AID OVER EXISTING DRESSING. IF BLEEDING CONTINUES CALL NUMBER BELOW OR GO TO EMERGENCY ROOM.    [facility]   Adult Discharge Orders & Instructions     For 24 hours after surgery    1. Get plenty of rest.  A responsible adult must stay with you for at least 24 hours after you leave the hospital.   2. Do not drive or use heavy equipment.  If you have weakness or tingling, don't drive or use heavy equipment until this feeling goes away.  3. Do not drink alcohol.  4. Avoid strenuous or risky activities.  Ask for help when climbing stairs.   5. You may feel lightheaded.  IF so, sit for a few minutes before standing.  Have someone help you get up.   6. If you have nausea (feel sick to your stomach): Drink only clear liquids such as apple juice, ginger ale, broth or 7-Up.  Rest may also help.  Be sure to drink enough fluids.  Move to a regular diet as you feel able.  7. You may have a slight fever. Call the doctor if your fever is over 100 F (37.7 C) (taken under the tongue) or lasts longer than 24 hours.  8. You may have a dry mouth, a sore throat, muscle aches or trouble sleeping.  These should go away after 24 hours.  9. Do not make important or legal decisions.   Call your doctor for any of the followin.  Signs of infection (fever, growing tenderness at the surgery site, a large amount of drainage or bleeding, severe pain, foul-smelling drainage, redness, swelling).    2. It has been over 8 to 10 hours since surgery and you are still not able to urinate (pass water).    3.  Headache for over 24 hours.    To contact a doctor, call [phone]        Pending Results     Date and Time Order Name Status Description    10/30/2017 0936 Surgical pathology exam In process             Admission Information     Date & Time Provider Department Dept. Phone    10/30/2017 Michael Chandler MD [facility] [phone]      Your Vitals Were     Blood Pressure Pulse  "Temperature Respirations Height Weight    121/67 86 97.9  F (36.6  C) (Oral) 9 1.651 m (5' 5\") 70.3 kg (155 lb)    Pulse Oximetry BMI (Body Mass Index)                94% 25.79 kg/m2          [facility] Information     [facility] lets you send messages to your doctor, view your test results, renew your prescriptions, schedule appointments and more. To sign up, go to [email or web address] . Click on \"Log in\" on the left side of the screen, which will take you to the Welcome page. Then click on \"Sign up Now\" on the right side of the page.     You will be asked to enter the access code listed below, as well as some personal information. Please follow the directions to create your username and password.     Your access code is: [email or web address]  Expires: 2017  3:08 PM     Your access code will  in 90 days. If you need help or a new code, please call your [facility] clinic or [phone].        Care EveryWhere ID     This is your Care EveryWhere ID. This could be used by other organizations to access your [facility] medical records  [id number]        Equal Access to Services     DIONNE GALLARDO AH: Calderon Holt, carrie arce, qacarmen kaalmada porfirio, yadi luong. So [facility] [phone].    ATENCIÓN: Si habla español, tiene a rueda disposición servicios gratuitos de asistencia lingüística. Llame al [phone].    We comply with applicable federal civil rights laws and [place] laws. We do not discriminate on the basis of race, color, national origin, age, disability, sex, sexual orientation, or gender identity.               Review of your medicines      START taking        Dose / Directions    oxyCODONE-acetaminophen 5-325 MG per tablet   Commonly known as:  PERCOCET   Used for:  S/P hysterectomy        Dose:  1-2 tablet   Take 1-2 tablets by mouth every 6 hours as needed for moderate to severe pain   Quantity:  40 tablet   Refills:  0       phenazopyridine 200 MG tablet "   Commonly known as:  PYRIDIUM   Used for:  S/P hysterectomy        Dose:  200 mg   Take 1 tablet (200 mg) by mouth 3 times daily as needed for irritation Expect your urine to appear bright orange   Quantity:  6 tablet   Refills:  0       senna-docusate 8.6-50 MG per tablet   Commonly known as:  SENOKOT-S;PERICOLACE   Used for:  S/P hysterectomy        Dose:  1-2 tablet   Take 1-2 tablets by mouth daily as needed for constipation   Quantity:  30 tablet   Refills:  3         CONTINUE these medicines which have NOT CHANGED        Dose / Directions    atorvastatin 20 MG tablet   Commonly known as:  LIPITOR   Used for:  Hyperlipidemia LDL goal <100        Dose:  20 mg   Take 1 tablet (20 mg) by mouth daily   Quantity:  90 tablet   Refills:  1       cetirizine 10 MG tablet   Commonly known as:  zyrTEC   Used for:  Seasonal allergic rhinitis, unspecified chronicity, unspecified trigger        Dose:  10 mg   Take 1 tablet (10 mg) by mouth every evening   Quantity:  30 tablet   Refills:  3       LORazepam 1 MG tablet   Commonly known as:  ATIVAN   Used for:  Anxiety        Dose:  0.5 mg   Take 0.5 tablets (0.5 mg) by mouth every 8 hours as needed for anxiety   Quantity:  20 tablet   Refills:  1       omeprazole 20 MG tablet   Used for:  Gastroesophageal reflux disease, esophagitis presence not specified        Dose:  20 mg   Take 1 tablet (20 mg) by mouth 2 times daily Take 30-60 minutes before a meal.   Quantity:  60 tablet   Refills:  3       ondansetron 4 MG tablet   Commonly known as:  ZOFRAN   Used for:  Anxiety attack        TAKE ONE TABLET BY MOUTH EVERY 6 HOURS AS NEEDED FOR NAUSEA OR VOMITING   Quantity:  18 tablet   Refills:  10       order for DME   Used for:  Elevated blood pressure reading without diagnosis of hypertension        Blood pressure cuff   Quantity:  1 each   Refills:  0       propranolol 20 MG tablet   Commonly known as:  INDERAL   Used for:  Sinus tachycardia, Anxiety        Dose:  20 mg   Take 1  tablet (20 mg) by mouth 2 times daily   Quantity:  180 tablet   Refills:  1       QUEtiapine 50 MG tablet   Commonly known as:  SEROquel   Used for:  Anxiety, Psychophysiological insomnia        TAKE ONE OR TWO TABLETS BY MOUTH NIGHTLY AS NEEDED   Quantity:  60 tablet   Refills:  3       sertraline 100 MG tablet   Commonly known as:  ZOLOFT   Used for:  Anxiety        Dose:  150 mg   Take 1.5 tablets (150 mg) by mouth daily   Quantity:  135 tablet   Refills:  1       sucralfate 1 GM tablet   Commonly known as:  CARAFATE   Used for:  Gastroesophageal reflux disease, esophagitis presence not specified        Dose:  1 g   Take 1 tablet (1 g) by mouth 4 times daily   Quantity:  120 tablet   Refills:  1         STOP taking     medroxyPROGESTERone 150 MG/ML injection   Commonly known as:  DEPO-PROVERA                Where to get your medicines      These medications were sent to [facility] [place], [place] - [address] [facility]   [address] [facility] , [facility] [address]     Phone:  [phone]     phenazopyridine 200 MG tablet    senna-docusate 8.6-50 MG per tablet         Some of these will need a paper prescription and others can be bought over the counter. Ask your nurse if you have questions.     Bring a paper prescription for each of these medications     oxyCODONE-acetaminophen 5-325 MG per tablet                Protect others around you: Learn how to safely use, store and throw away your medicines at www.disposemymeds.org.             Medication List: This is a list of all your medications and when to take them. Check marks below indicate your daily home schedule. Keep this list as a reference.      Medications           Morning Afternoon Evening Bedtime As Needed    atorvastatin 20 MG tablet   Commonly known as:  LIPITOR   Take 1 tablet (20 mg) by mouth daily                                cetirizine 10 MG tablet   Commonly known as:  zyrTEC   Take 1 tablet (10 mg) by mouth every evening                                 LORazepam 1 MG tablet   Commonly known as:  ATIVAN   Take 0.5 tablets (0.5 mg) by mouth every 8 hours as needed for anxiety                                omeprazole 20 MG tablet   Take 1 tablet (20 mg) by mouth 2 times daily Take 30-60 minutes before a meal.                                ondansetron 4 MG tablet   Commonly known as:  ZOFRAN   TAKE ONE TABLET BY MOUTH EVERY 6 HOURS AS NEEDED FOR NAUSEA OR VOMITING                                order for DME   Blood pressure cuff                                oxyCODONE-acetaminophen 5-325 MG per tablet   Commonly known as:  PERCOCET   Take 1-2 tablets by mouth every 6 hours as needed for moderate to severe pain   Next Dose Due:  3:45 PM                                phenazopyridine 200 MG tablet   Commonly known as:  PYRIDIUM   Take 1 tablet (200 mg) by mouth 3 times daily as needed for irritation Expect your urine to appear bright orange                                propranolol 20 MG tablet   Commonly known as:  INDERAL   Take 1 tablet (20 mg) by mouth 2 times daily                                QUEtiapine 50 MG tablet   Commonly known as:  SEROquel   TAKE ONE OR TWO TABLETS BY MOUTH NIGHTLY AS NEEDED                                senna-docusate 8.6-50 MG per tablet   Commonly known as:  SENOKOT-S;PERICOLACE   Take 1-2 tablets by mouth daily as needed for constipation                                sertraline 100 MG tablet   Commonly known as:  ZOLOFT   Take 1.5 tablets (150 mg) by mouth daily                                sucralfate 1 GM tablet   Commonly known as:  CARAFATE   Take 1 tablet (1 g) by mouth 4 times daily

## 2017-10-30 NOTE — ANESTHESIA CARE TRANSFER NOTE
Patient: Radha Beckwith    Procedure(s):  LAPAROSCOPIC HYSTERECTOMY TOTAL POSSIBLE SALPINGO-OOPHERECTOMY (BILATERAL) - Wound Class: II-Clean Contaminated    Diagnosis: menorrhagia and pelvic pain, cervical dysplasia  Diagnosis Additional Information: No value filed.    Anesthesia Type:   General, ETT     Note:  Airway :Face Mask  Patient transferred to:PACU  Handoff Report: Identifed the Patient, Identified the Reponsible Provider, Reviewed the pertinent medical history, Discussed the surgical course, Reviewed Intra-OP anesthesia mangement and issues during anesthesia, Set expectations for post-procedure period and Allowed opportunity for questions and acknowledgement of understanding      Vitals: (Last set prior to Anesthesia Care Transfer)    CRNA VITALS  10/30/2017 0932 - 10/30/2017 1007      10/30/2017             Pulse: 81    SpO2: 97 %    Resp Rate (observed): 12                Electronically Signed By: JEANNIE Anand CRNA  October 30, 2017  10:07 AM

## 2017-10-30 NOTE — TELEPHONE ENCOUNTER
Requested Prescriptions   Pending Prescriptions Disp Refills     sucralfate (CARAFATE) 1 GM tablet [Pharmacy Med Name: SUCRALFATE 1GM TABS] 120 tablet 1     Sig: TAKE ONE TABLET BY MOUTH FOUR TIMES A DAY    Rx Protocol Miscellaneous Gastrointestinal Agents Passed    10/27/2017  5:04 PM       Passed - Recent or future visit with authorizing provider's specialty    Patient had office visit in the last year or has a visit in the next 30 days with authorizing provider.  See chart review.              Passed - Patient is 18 years of age or older

## 2017-10-30 NOTE — BRIEF OP NOTE
Brief Operative Note:  Preoperative Diagnosis:cervical dysplasia    Postoperative Diagnosis: same      Procedure:  Total laparoscopic hysterectomy  And  laparoscopic bilateral salpingectomy  And cystoscopy    Anesthesia: General endotracheal anesthesia (GETA)        Surgeon: Michael Chandler MD    Assistant: Tigre Lowry DO    Findings: see dictation      Estimated blood loss: 50 cc    Fluids: 2000 cc crystalloid      Complications: none      See complete operative note-dictated separately.  RJ Chandler MD

## 2017-11-01 LAB — COPATH REPORT: NORMAL

## 2017-11-02 ENCOUNTER — OFFICE VISIT (OUTPATIENT)
Dept: FAMILY MEDICINE | Facility: CLINIC | Age: 42
End: 2017-11-02
Payer: COMMERCIAL

## 2017-11-02 VITALS
BODY MASS INDEX: 25.83 KG/M2 | TEMPERATURE: 97.9 F | HEIGHT: 65 IN | HEART RATE: 92 BPM | WEIGHT: 155 LBS | DIASTOLIC BLOOD PRESSURE: 76 MMHG | RESPIRATION RATE: 16 BRPM | SYSTOLIC BLOOD PRESSURE: 128 MMHG | OXYGEN SATURATION: 98 %

## 2017-11-02 DIAGNOSIS — Z09 POSTOPERATIVE EXAMINATION: Primary | ICD-10-CM

## 2017-11-02 PROCEDURE — 99024 POSTOP FOLLOW-UP VISIT: CPT | Performed by: OBSTETRICS & GYNECOLOGY

## 2017-11-02 RX ORDER — HYDROMORPHONE HYDROCHLORIDE 4 MG/1
4 TABLET ORAL EVERY 6 HOURS PRN
Qty: 12 TABLET | Refills: 0 | Status: SHIPPED | OUTPATIENT
Start: 2017-11-02 | End: 2017-12-21

## 2017-11-02 ASSESSMENT — PAIN SCALES - GENERAL: PAINLEVEL: EXTREME PAIN (8)

## 2017-11-02 NOTE — PROGRESS NOTES
"Radha is here for 3 day   postop check from laparoscopic  hysterectomy. She offers no concerns.  She reports not quite  adequate pain control and normal bowel and bladder function. No fevers.     Objective: Vital signs: Blood pressure 128/76, pulse 92, temperature 97.9  F (36.6  C), temperature source Temporal, resp. rate 16, height 5' 5\" (1.651 m), weight 155 lb (70.3 kg), SpO2 98 %, not currently breastfeeding.      Chest is clear to ausculatation. Cardiac exam is normal. Abdomen is soft and not distended. Normal bowel sounds heard. Incisions are clean and dry and intact-healing well.    Assessment: Normal 3 day  post op check.    Plan:i will change her pain med to dilaudid but only for 2 days-then she is expected to wean down from it and use the percocet again- she told me she can tolerate dilaudid despite her allergies- has had it before- and get off all narcotics within this week- Recheck in 5 weeks  , sooner if wound becomes red or tender or fever or any other concerns. Limit lifting to 10 lbs for a total of 12 weeks.No intercourse until 6 weeks out from surgery.  RJ Chandler MD  "

## 2017-11-02 NOTE — MR AVS SNAPSHOT
"              After Visit Summary   11/2/2017    Radha Beckwith    MRN: [id number]           Patient Information     Date Of Birth          1975        Visit Information        Provider Department      11/2/2017 11:10 AM Michael Chandler MD [facility]        Today's Diagnoses     Postoperative examination    -  1       Follow-ups after your visit        Your next 10 appointments already scheduled     Dec 06, 2017  2:50 PM CST   SHORT with Michael Chandler MD   [facility] ([facility])    [address] [place] [id number] [phone]              Who to contact     If you have questions or need follow up information about today's clinic visit or your schedule please contact [facility] directly at [phone].  Normal or non-critical lab and imaging results will be communicated to you by MyChart, letter or phone within 4 business days after the clinic has received the results. If you do not hear from us within 7 days, please contact the clinic through MyChart or phone. If you have a critical or abnormal lab result, we will notify you by phone as soon as possible.  Submit refill requests through [employer] or call your pharmacy and they will forward the refill request to us. Please allow 3 business days for your refill to be completed.          Additional Information About Your Visit        [employer] Information     [employer] lets you send messages to your doctor, view your test results, renew your prescriptions, schedule appointments and more. To sign up, go to [email or web address] . Click on \"Log in\" on the left side of the screen, which will take you to the Welcome page. Then click on \"Sign up Now\" on the right side of the page.     You will be asked to enter the access code listed below, as well as some personal information. Please follow the directions to create your username and password.     Your access code " "is: [email or web address]  Expires: 2017  3:08 PM     Your access code will  in 90 days. If you need help or a new code, please call your [facility] or [phone].        Care EveryWhere ID     This is your Care EveryWhere ID. This could be used by other organizations to access your [place] medical records  [id number]        Your Vitals Were     Pulse Temperature Respirations Height Last Period Pulse Oximetry    92 97.9  F (36.6  C) (Temporal) 16 5' 5\" (1.651 m) (LMP Unknown) 98%    Breastfeeding? BMI (Body Mass Index)                No 25.79 kg/m2           Blood Pressure from Last 3 Encounters:   17 128/76   10/30/17 139/75   10/24/17 155/82    Weight from Last 3 Encounters:   17 155 lb (70.3 kg)   10/30/17 155 lb (70.3 kg)   10/24/17 155 lb (70.3 kg)              Today, you had the following     No orders found for display         Today's Medication Changes          These changes are accurate as of: 17 11:41 AM.  If you have any questions, ask your nurse or doctor.               Start taking these medicines.        Dose/Directions    HYDROmorphone 4 MG tablet   Commonly known as:  DILAUDID   Used for:  Postoperative examination   Started by:  Michael Chandler MD        Dose:  4 mg   Take 1 tablet (4 mg) by mouth every 6 hours as needed for moderate to severe pain maximum 4 tablet(s) per day   Quantity:  12 tablet   Refills:  0            Where to get your medicines      Some of these will need a paper prescription and others can be bought over the counter.  Ask your nurse if you have questions.     Bring a paper prescription for each of these medications     HYDROmorphone 4 MG tablet                Primary Care Provider Office Phone # Fax #    [facility] [phone] [address] [facility] [address]        Equal Access to Services     DIONNE GALLARDO AH: Calderon Holt, carrie lujaylene, qaybta kayadi corderoin " alber barlilasbenjamin felicearin laDaliaaayuriy ah. So [facility] [phone].    ATENCIÓN: Si maryellenla roopa, tiene a rueda disposición servicios gratuitos de asistencia lingüística. Nancy tatum [phone].    We comply with applicable federal civil rights laws and [place] laws. We do not discriminate on the basis of race, color, national origin, age, disability, sex, sexual orientation, or gender identity.            Thank you!     Thank you for choosing [facility]  for your care. Our goal is always to provide you with excellent care. Hearing back from our patients is one way we can continue to improve our services. Please take a few minutes to complete the written survey that you may receive in the mail after your visit with us. Thank you!             Your Updated Medication List - Protect others around you: Learn how to safely use, store and throw away your medicines at www.disposemymeds.org.          This list is accurate as of: 11/2/17 11:41 AM.  Always use your most recent med list.                   Brand Name Dispense Instructions for use Diagnosis    atorvastatin 20 MG tablet    LIPITOR    90 tablet    Take 1 tablet (20 mg) by mouth daily    Hyperlipidemia LDL goal <100       cetirizine 10 MG tablet    zyrTEC    30 tablet    Take 1 tablet (10 mg) by mouth every evening    Seasonal allergic rhinitis, unspecified chronicity, unspecified trigger       HYDROmorphone 4 MG tablet    DILAUDID    12 tablet    Take 1 tablet (4 mg) by mouth every 6 hours as needed for moderate to severe pain maximum 4 tablet(s) per day    Postoperative examination       LORazepam 1 MG tablet    ATIVAN    20 tablet    Take 0.5 tablets (0.5 mg) by mouth every 8 hours as needed for anxiety    Anxiety       omeprazole 20 MG tablet     60 tablet    Take 1 tablet (20 mg) by mouth 2 times daily Take 30-60 minutes before a meal.    Gastroesophageal reflux disease, esophagitis presence not specified       ondansetron 4 MG tablet    ZOFRAN    18 tablet     TAKE ONE TABLET BY MOUTH EVERY 6 HOURS AS NEEDED FOR NAUSEA OR VOMITING    Anxiety attack       order for DME     1 each    Blood pressure cuff    Elevated blood pressure reading without diagnosis of hypertension       oxyCODONE-acetaminophen 5-325 MG per tablet    PERCOCET    40 tablet    Take 1-2 tablets by mouth every 6 hours as needed for moderate to severe pain    S/P hysterectomy       phenazopyridine 200 MG tablet    PYRIDIUM    6 tablet    Take 1 tablet (200 mg) by mouth 3 times daily as needed for irritation Expect your urine to appear bright orange    S/P hysterectomy       propranolol 20 MG tablet    INDERAL    180 tablet    Take 1 tablet (20 mg) by mouth 2 times daily    Sinus tachycardia, Anxiety       QUEtiapine 50 MG tablet    SEROquel    60 tablet    TAKE ONE OR TWO TABLETS BY MOUTH NIGHTLY AS NEEDED    Anxiety, Psychophysiological insomnia       senna-docusate 8.6-50 MG per tablet    SENOKOT-S;PERICOLACE    30 tablet    Take 1-2 tablets by mouth daily as needed for constipation    S/P hysterectomy       sertraline 100 MG tablet    ZOLOFT    135 tablet    Take 1.5 tablets (150 mg) by mouth daily    Anxiety       sucralfate 1 GM tablet    CARAFATE    120 tablet    TAKE ONE TABLET BY MOUTH FOUR TIMES A DAY    Gastroesophageal reflux disease, esophagitis presence not specified

## 2017-11-02 NOTE — NURSING NOTE
"Chief Complaint   Patient presents with     Surgical Followup       Initial /76  Pulse 92  Temp 97.9  F (36.6  C) (Temporal)  Resp 16  Ht 5' 5\" (1.651 m)  Wt 155 lb (70.3 kg)  LMP  (LMP Unknown)  SpO2 98%  Breastfeeding? No  BMI 25.79 kg/m2 Estimated body mass index is 25.79 kg/(m^2) as calculated from the following:    Height as of this encounter: 5' 5\" (1.651 m).    Weight as of this encounter: 155 lb (70.3 kg)..   BP completed using cuff size: regular  Medication Rec Completed    Frida Navas CMA    "

## 2017-11-08 ENCOUNTER — TELEPHONE (OUTPATIENT)
Dept: OBGYN | Facility: CLINIC | Age: 42
End: 2017-11-08

## 2017-11-08 DIAGNOSIS — Z90.710 S/P HYSTERECTOMY: ICD-10-CM

## 2017-11-08 DIAGNOSIS — R30.0 DYSURIA: Primary | ICD-10-CM

## 2017-11-08 RX ORDER — PHENAZOPYRIDINE HYDROCHLORIDE 200 MG/1
200 TABLET, FILM COATED ORAL 2 TIMES DAILY PRN
Qty: 10 TABLET | Refills: 0 | Status: SHIPPED | OUTPATIENT
Start: 2017-11-08 | End: 2017-11-24

## 2017-11-08 NOTE — TELEPHONE ENCOUNTER
Patient calls with symptoms of urinary frequency and burning. Per RM leave urine sample and ok for refill on pyridium. See RM orders for today  Frida Navas, CMA

## 2017-11-13 ENCOUNTER — TELEPHONE (OUTPATIENT)
Dept: FAMILY MEDICINE | Facility: CLINIC | Age: 42
End: 2017-11-13

## 2017-11-13 DIAGNOSIS — R30.0 DYSURIA: ICD-10-CM

## 2017-11-13 LAB
ALBUMIN UR-MCNC: NEGATIVE MG/DL
APPEARANCE UR: CLEAR
BACTERIA #/AREA URNS HPF: ABNORMAL /HPF
BILIRUB UR QL STRIP: NEGATIVE
COLOR UR AUTO: YELLOW
GLUCOSE UR STRIP-MCNC: NEGATIVE MG/DL
HGB UR QL STRIP: ABNORMAL
KETONES UR STRIP-MCNC: NEGATIVE MG/DL
LEUKOCYTE ESTERASE UR QL STRIP: ABNORMAL
MUCOUS THREADS #/AREA URNS LPF: PRESENT /LPF
NITRATE UR QL: NEGATIVE
PH UR STRIP: 5 PH (ref 5–7)
RBC #/AREA URNS AUTO: 2 /HPF (ref 0–2)
SOURCE: ABNORMAL
SP GR UR STRIP: 1.02 (ref 1–1.03)
SQUAMOUS #/AREA URNS AUTO: 1 /HPF (ref 0–1)
UROBILINOGEN UR STRIP-MCNC: 0 MG/DL (ref 0–2)
WBC #/AREA URNS AUTO: 5 /HPF (ref 0–2)

## 2017-11-13 PROCEDURE — 81001 URINALYSIS AUTO W/SCOPE: CPT | Performed by: OBSTETRICS & GYNECOLOGY

## 2017-11-13 PROCEDURE — 87086 URINE CULTURE/COLONY COUNT: CPT | Performed by: OBSTETRICS & GYNECOLOGY

## 2017-11-13 NOTE — TELEPHONE ENCOUNTER
Reason for call:  Patient reporting a symptom    Symptom or request:  2 week post op from hysterectomy.  Patient reports bladder leakage.      Duration (how long have symptoms been present): ongoing    Have you been treated for this before? No    Additional comments: pt calling to speak with a nurse    Phone Number patient can be reached at:  Home number on file [phone] (home)    Best Time:  Any    Can we leave a detailed message on this number:  YES    Call taken on 11/13/2017 at 1:54 PM by Xuan Morse

## 2017-11-13 NOTE — TELEPHONE ENCOUNTER
Patient is called and states she was unable to leave a urine sample last week when it was ordered.  She has not started the Pyridium, but her urine is a bit orange in color.  She states she has burning with urination, and when she stands up, she has leakage of the bladder.  She does not know if these two things are related.  She is denying the following: fever, severe pain.  She is instructed to go to the lab and leave a urine sample so we can see if she has an infection.  If she does, the leaking of urine may improve with treatment of the UTI.  If she does not have an infection, we will make a new plan.  Patient understands and agrees to this plan.  Patient will come over now to leave a sample and we will go from there.  Patient would like a prescription, if one is called for, to be sent to the [facility] pharmacy.  Routing to PCP for further advice.    Edith Nova RN

## 2017-11-14 LAB
BACTERIA SPEC CULT: NORMAL
Lab: NORMAL
SPECIMEN SOURCE: NORMAL

## 2017-11-14 NOTE — TELEPHONE ENCOUNTER
Per RM normal UA results. Will wait for culture as well. The leakage could be from bladder spasms which are entirely normal 2 weeks out from a hysterectomy. Patient informed  Frida Navas CMA

## 2017-11-15 NOTE — PROGRESS NOTES
Frida Please inform Radha/ or caretaker  that this result(s) is/are normal.  The UC was normal growth-Thanks. RJ Chandler MD

## 2017-11-24 ENCOUNTER — OFFICE VISIT (OUTPATIENT)
Dept: FAMILY MEDICINE | Facility: CLINIC | Age: 42
End: 2017-11-24
Payer: COMMERCIAL

## 2017-11-24 VITALS
WEIGHT: 159 LBS | OXYGEN SATURATION: 98 % | BODY MASS INDEX: 26.49 KG/M2 | HEART RATE: 100 BPM | RESPIRATION RATE: 16 BRPM | HEIGHT: 65 IN | DIASTOLIC BLOOD PRESSURE: 80 MMHG | TEMPERATURE: 97.9 F | SYSTOLIC BLOOD PRESSURE: 132 MMHG

## 2017-11-24 DIAGNOSIS — Z09 POSTOPERATIVE EXAMINATION: Primary | ICD-10-CM

## 2017-11-24 PROCEDURE — 99024 POSTOP FOLLOW-UP VISIT: CPT | Performed by: OBSTETRICS & GYNECOLOGY

## 2017-11-24 ASSESSMENT — PAIN SCALES - GENERAL: PAINLEVEL: MODERATE PAIN (5)

## 2017-11-24 NOTE — PROGRESS NOTES
"Radha is here for 3 1/2  week postop check from total laparoscopic  hysterectomy.The ovaries were  retained  . She offers no concerns.   She reports adequate pain control and normal bowel and bladder function.  No fevers.    Objective:  Vital signs are stable as shown in chart. Blood pressure 132/80, pulse 100, temperature 97.9  F (36.6  C), temperature source Temporal, resp. rate 16, height 5' 5\" (1.651 m), weight 159 lb (72.1 kg), SpO2 98 %, not currently breastfeeding.       Chest is clear to ausculatation. Cardiac exam is normal. Abdomen is soft and not distended. Normal bowel sounds heard. Incisions are clean and dry and intact-healing well. The pelvic exam reveals that the vaginal cuff is healing well. No other abnormal findings noted.   My nurse was present for the exam.      Assessment: Normal 4 week  post op check for total laparoscopic hysterectomy.     Plan: pathology discussed with her.  It was benign.     Recheck prn   ,or if wound becomes red or tender or fever or any other concerns. Limit lifting to 20 lbs for 12   full weeks. HRT not needed.  I advised holding off work for at least 1 more week- notes written for work. She was advised to repeat pap in 1 year- she had LSIl on recent pap-  R Ramesh PATEL  "

## 2017-11-24 NOTE — NURSING NOTE
"Chief Complaint   Patient presents with     Surgical Followup       Initial /80  Pulse 100  Temp 97.9  F (36.6  C) (Temporal)  Resp 16  Ht 5' 5\" (1.651 m)  Wt 159 lb (72.1 kg)  LMP  (LMP Unknown)  SpO2 98%  Breastfeeding? No  BMI 26.46 kg/m2 Estimated body mass index is 26.46 kg/(m^2) as calculated from the following:    Height as of this encounter: 5' 5\" (1.651 m).    Weight as of this encounter: 159 lb (72.1 kg)..   BP completed using cuff size: regular  Medication Rec Completed    Frida Navas CMA    "

## 2017-11-24 NOTE — MR AVS SNAPSHOT
"              After Visit Summary   2017    Radha Beckwith    MRN: [id number]           Patient Information     Date Of Birth          1975        Visit Information        Provider Department      2017 9:40 AM Michael Chandler MD [facility]        Today's Diagnoses     Postoperative examination    -  1       Follow-ups after your visit        Who to contact     If you have questions or need follow up information about today's clinic visit or your schedule please contact [facility] directly at [phone].  Normal or non-critical lab and imaging results will be communicated to you by [employer], letter or phone within 4 business days after the clinic has received the results. If you do not hear from us within 7 days, please contact the clinic through [employer] or phone. If you have a critical or abnormal lab result, we will notify you by phone as soon as possible.  Submit refill requests through [employer] or call your pharmacy and they will forward the refill request to us. Please allow 3 business days for your refill to be completed.          Additional Information About Your Visit        MyChart Information     [employer] lets you send messages to your doctor, view your test results, renew your prescriptions, schedule appointments and more. To sign up, go to [email or web address] . Click on \"Log in\" on the left side of the screen, which will take you to the Welcome page. Then click on \"Sign up Now\" on the right side of the page.     You will be asked to enter the access code listed below, as well as some personal information. Please follow the directions to create your username and password.     Your access code is: [email or web address]  Expires: 2017  2:08 PM     Your access code will  in 90 days. If you need help or a new code, please call your [facility] or [phone].        Care EveryWhere ID     This is your Care EveryWhere ID. This could be " "used by other organizations to access your [place] medical records  [id number]        Your Vitals Were     Pulse Temperature Respirations Height Last Period Pulse Oximetry    100 97.9  F (36.6  C) (Temporal) 16 5' 5\" (1.651 m) (LMP Unknown) 98%    Breastfeeding? BMI (Body Mass Index)                No 26.46 kg/m2           Blood Pressure from Last 3 Encounters:   11/24/17 132/80   11/02/17 128/76   10/30/17 139/75    Weight from Last 3 Encounters:   11/24/17 159 lb (72.1 kg)   11/02/17 155 lb (70.3 kg)   10/30/17 155 lb (70.3 kg)              Today, you had the following     No orders found for display       Primary Care Provider Office Phone # Fax #    [facility] [phone] [address] [address] [facility] [address]        Equal Access to Services     DIONNE GALLARDO : Hadii quintin alexander hadasho Soomaali, waaxda luqadaha, qaybta kaalmada adeegyada, waxay wesin haymarnien citlali amezcua . So [facility] [phone].    ATENCIÓN: Si habla español, tiene a rueda disposición servicios gratuitos de asistencia lingüística. Nancy al [phone].    We comply with applicable federal civil rights laws and [place] laws. We do not discriminate on the basis of race, color, national origin, age, disability, sex, sexual orientation, or gender identity.            Thank you!     Thank you for choosing [facility]  for your care. Our goal is always to provide you with excellent care. Hearing back from our patients is one way we can continue to improve our services. Please take a few minutes to complete the written survey that you may receive in the mail after your visit with us. Thank you!             Your Updated Medication List - Protect others around you: Learn how to safely use, store and throw away your medicines at www.disposemymeds.org.          This list is accurate as of: 11/24/17 10:22 AM.  Always use your most recent med list.                   Brand Name Dispense Instructions for " use Diagnosis    atorvastatin 20 MG tablet    LIPITOR    90 tablet    Take 1 tablet (20 mg) by mouth daily    Hyperlipidemia LDL goal <100       cetirizine 10 MG tablet    zyrTEC    30 tablet    Take 1 tablet (10 mg) by mouth every evening    Seasonal allergic rhinitis, unspecified chronicity, unspecified trigger       HYDROmorphone 4 MG tablet    DILAUDID    12 tablet    Take 1 tablet (4 mg) by mouth every 6 hours as needed for moderate to severe pain maximum 4 tablet(s) per day    Postoperative examination       LORazepam 1 MG tablet    ATIVAN    20 tablet    Take 0.5 tablets (0.5 mg) by mouth every 8 hours as needed for anxiety    Anxiety       omeprazole 20 MG tablet     60 tablet    Take 1 tablet (20 mg) by mouth 2 times daily Take 30-60 minutes before a meal.    Gastroesophageal reflux disease, esophagitis presence not specified       ondansetron 4 MG tablet    ZOFRAN    18 tablet    TAKE ONE TABLET BY MOUTH EVERY 6 HOURS AS NEEDED FOR NAUSEA OR VOMITING    Anxiety attack       order for DME     1 each    Blood pressure cuff    Elevated blood pressure reading without diagnosis of hypertension       oxyCODONE-acetaminophen 5-325 MG per tablet    PERCOCET    40 tablet    Take 1-2 tablets by mouth every 6 hours as needed for moderate to severe pain    S/P hysterectomy       propranolol 20 MG tablet    INDERAL    180 tablet    Take 1 tablet (20 mg) by mouth 2 times daily    Sinus tachycardia, Anxiety       QUEtiapine 50 MG tablet    SEROquel    60 tablet    TAKE ONE OR TWO TABLETS BY MOUTH NIGHTLY AS NEEDED    Anxiety, Psychophysiological insomnia       senna-docusate 8.6-50 MG per tablet    SENOKOT-S;PERICOLACE    30 tablet    Take 1-2 tablets by mouth daily as needed for constipation    S/P hysterectomy       sertraline 100 MG tablet    ZOLOFT    135 tablet    Take 1.5 tablets (150 mg) by mouth daily    Anxiety       sucralfate 1 GM tablet    CARAFATE    120 tablet    TAKE ONE TABLET BY MOUTH FOUR TIMES A DAY     Gastroesophageal reflux disease, esophagitis presence not specified

## 2017-12-06 DIAGNOSIS — F41.9 ANXIETY: ICD-10-CM

## 2017-12-06 NOTE — TELEPHONE ENCOUNTER
Lorazepam      Last Written Prescription Date: 10-16-17  Last Fill Quantity: 20,  # refills: 1   Last Office Visit with [facility], [facility] or [facility] prescribing provider: 11-24-17    [facility]  Pharmacy [facility]  On Behalf of [facility]

## 2017-12-07 NOTE — TELEPHONE ENCOUNTER
Pt was asked to follow up on anxiety in November or December.  Please have her schedule appointment for this before medication can be refilled.

## 2017-12-07 NOTE — TELEPHONE ENCOUNTER
Pt is now scheduled for 12/29/17 for anxiety follow up.  Will you fill her medication to get her to her appt?  Zenon Steiner, CMA

## 2017-12-08 RX ORDER — LORAZEPAM 1 MG/1
0.5 TABLET ORAL EVERY 8 HOURS PRN
Qty: 20 TABLET | Refills: 0 | Status: SHIPPED | OUTPATIENT
Start: 2017-12-08 | End: 2017-12-29

## 2017-12-20 ENCOUNTER — TELEPHONE (OUTPATIENT)
Dept: FAMILY MEDICINE | Facility: CLINIC | Age: 42
End: 2017-12-20

## 2017-12-20 NOTE — TELEPHONE ENCOUNTER
Reason for call:  Patient reporting a symptom    Symptom or request: Radha is calling today stating she had a hysterectomy on Oct 30th. She says she hasn't had a lot of issues since that time but today is having significant pain along her incision that is traveling around to her back.     Duration (how long have symptoms been present): 1 day    Have you been treated for this before? No    Additional comments: see above    Phone Number patient can be reached at:  Cell number on file:    Telephone Information:   Mobile [phone]       Best Time:  asap    Can we leave a detailed message on this number:  YES    Call taken on 12/20/2017 at 3:36 PM by Keenan Holt

## 2017-12-20 NOTE — TELEPHONE ENCOUNTER
"Patient is calling to report she had a hysterectomy on 10/30/17.  She has not had any issues since, but today she went to work at 10:00 am, and by 11:30 she started to feel pain in her abdominal area.  She is reporting it slowly increased throughout the day and is now at about 6.5-7/10 constantly.  She states the pain is across her abdomen about 1\" above her belly button and is now radiating to her back on the right side, but the back pain is not as high as her abdomen pain.       Patient is denying the following: fainting, light-headedness, dizziness, vomiting, nausea, diarrhea, sudden pain, cold/pale skin or profuse sweating, age >60, black or bloody stools, shoulder pain, diabetes, heart disease or blood clotting issues, rapidly increasing pain, fall or injury to the area, fever, pain worsening with coughing (although sh estates it feels weird in the area when coughing).    Patient should be seen in 24 hours as she does not feel the pain is so bad that she needs to be seen in the ED tonight.  She states understanding that she will have to go to the ED tonight if she experiences any of the above stated symptoms or the pain gets worse.  Routing to PCP for further advice.    Edith Nova RN    Telephone Triage Protocols for Nurses, 5th edition - Jyoti Zelaya: Abdominal pain, Adult    "

## 2017-12-21 ENCOUNTER — APPOINTMENT (OUTPATIENT)
Dept: CT IMAGING | Facility: CLINIC | Age: 42
End: 2017-12-21
Attending: EMERGENCY MEDICINE
Payer: COMMERCIAL

## 2017-12-21 ENCOUNTER — HOSPITAL ENCOUNTER (EMERGENCY)
Facility: CLINIC | Age: 42
Discharge: HOME | End: 2017-12-21
Attending: EMERGENCY MEDICINE | Admitting: EMERGENCY MEDICINE
Payer: COMMERCIAL

## 2017-12-21 VITALS
BODY MASS INDEX: 25.79 KG/M2 | OXYGEN SATURATION: 96 % | DIASTOLIC BLOOD PRESSURE: 72 MMHG | SYSTOLIC BLOOD PRESSURE: 116 MMHG | WEIGHT: 155 LBS | HEART RATE: 117 BPM | TEMPERATURE: 98.2 F | RESPIRATION RATE: 18 BRPM

## 2017-12-21 DIAGNOSIS — R10.33 ABDOMINAL PAIN, ACUTE, PERIUMBILICAL: ICD-10-CM

## 2017-12-21 LAB
ALBUMIN SERPL-MCNC: 4.1 G/DL (ref 3.4–5)
ALBUMIN UR-MCNC: NEGATIVE MG/DL
ALP SERPL-CCNC: 70 U/L (ref 40–150)
ALT SERPL W P-5'-P-CCNC: 31 U/L (ref 0–50)
ANION GAP SERPL CALCULATED.3IONS-SCNC: 7 MMOL/L (ref 3–14)
APPEARANCE UR: CLEAR
AST SERPL W P-5'-P-CCNC: 20 U/L (ref 0–45)
BASOPHILS # BLD AUTO: 0 10E9/L (ref 0–0.2)
BASOPHILS NFR BLD AUTO: 0.3 %
BILIRUB SERPL-MCNC: 0.3 MG/DL (ref 0.2–1.3)
BILIRUB UR QL STRIP: NEGATIVE
BUN SERPL-MCNC: 11 MG/DL (ref 7–30)
CALCIUM SERPL-MCNC: 9.3 MG/DL (ref 8.5–10.1)
CHLORIDE SERPL-SCNC: 105 MMOL/L (ref 94–109)
CO2 SERPL-SCNC: 25 MMOL/L (ref 20–32)
COLOR UR AUTO: ABNORMAL
CREAT SERPL-MCNC: 0.65 MG/DL (ref 0.52–1.04)
DIFFERENTIAL METHOD BLD: NORMAL
EOSINOPHIL # BLD AUTO: 0.1 10E9/L (ref 0–0.7)
EOSINOPHIL NFR BLD AUTO: 1.1 %
ERYTHROCYTE [DISTWIDTH] IN BLOOD BY AUTOMATED COUNT: 12.7 % (ref 10–15)
GFR SERPL CREATININE-BSD FRML MDRD: >90 ML/MIN/1.7M2
GLUCOSE SERPL-MCNC: 118 MG/DL (ref 70–99)
GLUCOSE UR STRIP-MCNC: NEGATIVE MG/DL
HCT VFR BLD AUTO: 42 % (ref 35–47)
HGB BLD-MCNC: 13.6 G/DL (ref 11.7–15.7)
HGB UR QL STRIP: NEGATIVE
IMM GRANULOCYTES # BLD: 0 10E9/L (ref 0–0.4)
IMM GRANULOCYTES NFR BLD: 0.1 %
KETONES UR STRIP-MCNC: NEGATIVE MG/DL
LEUKOCYTE ESTERASE UR QL STRIP: NEGATIVE
LIPASE SERPL-CCNC: 126 U/L (ref 73–393)
LYMPHOCYTES # BLD AUTO: 1.8 10E9/L (ref 0.8–5.3)
LYMPHOCYTES NFR BLD AUTO: 22.4 %
MCH RBC QN AUTO: 31.5 PG (ref 26.5–33)
MCHC RBC AUTO-ENTMCNC: 32.4 G/DL (ref 31.5–36.5)
MCV RBC AUTO: 97 FL (ref 78–100)
MONOCYTES # BLD AUTO: 0.5 10E9/L (ref 0–1.3)
MONOCYTES NFR BLD AUTO: 6 %
MUCOUS THREADS #/AREA URNS LPF: PRESENT /LPF
NEUTROPHILS # BLD AUTO: 5.5 10E9/L (ref 1.6–8.3)
NEUTROPHILS NFR BLD AUTO: 70.1 %
NITRATE UR QL: NEGATIVE
PH UR STRIP: 6 PH (ref 5–7)
PLATELET # BLD AUTO: 199 10E9/L (ref 150–450)
POTASSIUM SERPL-SCNC: 3.8 MMOL/L (ref 3.4–5.3)
PROT SERPL-MCNC: 7.9 G/DL (ref 6.8–8.8)
RBC # BLD AUTO: 4.32 10E12/L (ref 3.8–5.2)
RBC #/AREA URNS AUTO: 0 /HPF (ref 0–2)
SODIUM SERPL-SCNC: 137 MMOL/L (ref 133–144)
SOURCE: ABNORMAL
SP GR UR STRIP: 1.01 (ref 1–1.03)
SQUAMOUS #/AREA URNS AUTO: <1 /HPF (ref 0–1)
UROBILINOGEN UR STRIP-MCNC: 0 MG/DL (ref 0–2)
WBC # BLD AUTO: 7.9 10E9/L (ref 4–11)
WBC #/AREA URNS AUTO: 0 /HPF (ref 0–2)

## 2017-12-21 PROCEDURE — 96374 THER/PROPH/DIAG INJ IV PUSH: CPT | Performed by: EMERGENCY MEDICINE

## 2017-12-21 PROCEDURE — 80053 COMPREHEN METABOLIC PANEL: CPT | Performed by: EMERGENCY MEDICINE

## 2017-12-21 PROCEDURE — 83690 ASSAY OF LIPASE: CPT | Performed by: EMERGENCY MEDICINE

## 2017-12-21 PROCEDURE — 85025 COMPLETE CBC W/AUTO DIFF WBC: CPT | Performed by: EMERGENCY MEDICINE

## 2017-12-21 PROCEDURE — 96375 TX/PRO/DX INJ NEW DRUG ADDON: CPT | Performed by: EMERGENCY MEDICINE

## 2017-12-21 PROCEDURE — 96376 TX/PRO/DX INJ SAME DRUG ADON: CPT | Performed by: EMERGENCY MEDICINE

## 2017-12-21 PROCEDURE — 81001 URINALYSIS AUTO W/SCOPE: CPT | Performed by: EMERGENCY MEDICINE

## 2017-12-21 PROCEDURE — 99285 EMERGENCY DEPT VISIT HI MDM: CPT | Mod: 25 | Performed by: EMERGENCY MEDICINE

## 2017-12-21 PROCEDURE — 25000128 H RX IP 250 OP 636: Performed by: EMERGENCY MEDICINE

## 2017-12-21 PROCEDURE — 74177 CT ABD & PELVIS W/CONTRAST: CPT

## 2017-12-21 PROCEDURE — 96361 HYDRATE IV INFUSION ADD-ON: CPT | Performed by: EMERGENCY MEDICINE

## 2017-12-21 PROCEDURE — 25000125 ZZHC RX 250: Performed by: RADIOLOGY

## 2017-12-21 PROCEDURE — 99284 EMERGENCY DEPT VISIT MOD MDM: CPT | Mod: Z6 | Performed by: EMERGENCY MEDICINE

## 2017-12-21 PROCEDURE — 25000128 H RX IP 250 OP 636: Performed by: RADIOLOGY

## 2017-12-21 RX ORDER — HYDROMORPHONE HYDROCHLORIDE 1 MG/ML
0.5 INJECTION, SOLUTION INTRAMUSCULAR; INTRAVENOUS; SUBCUTANEOUS
Status: COMPLETED | OUTPATIENT
Start: 2017-12-21 | End: 2017-12-21

## 2017-12-21 RX ORDER — HYDROCODONE BITARTRATE AND ACETAMINOPHEN 5; 325 MG/1; MG/1
1-2 TABLET ORAL EVERY 6 HOURS PRN
Qty: 6 TABLET | Refills: 0 | Status: SHIPPED | OUTPATIENT
Start: 2017-12-21 | End: 2017-12-29

## 2017-12-21 RX ORDER — IOPAMIDOL 755 MG/ML
500 INJECTION, SOLUTION INTRAVASCULAR ONCE
Status: COMPLETED | OUTPATIENT
Start: 2017-12-21 | End: 2017-12-21

## 2017-12-21 RX ORDER — SODIUM CHLORIDE 9 MG/ML
1000 INJECTION, SOLUTION INTRAVENOUS CONTINUOUS
Status: DISCONTINUED | OUTPATIENT
Start: 2017-12-21 | End: 2017-12-21 | Stop reason: HOSPADM

## 2017-12-21 RX ORDER — ONDANSETRON 2 MG/ML
4 INJECTION INTRAMUSCULAR; INTRAVENOUS EVERY 30 MIN PRN
Status: DISCONTINUED | OUTPATIENT
Start: 2017-12-21 | End: 2017-12-21 | Stop reason: HOSPADM

## 2017-12-21 RX ORDER — HYDROMORPHONE HYDROCHLORIDE 1 MG/ML
0.5 INJECTION, SOLUTION INTRAMUSCULAR; INTRAVENOUS; SUBCUTANEOUS
Status: DISCONTINUED | OUTPATIENT
Start: 2017-12-21 | End: 2017-12-21 | Stop reason: HOSPADM

## 2017-12-21 RX ADMIN — SODIUM CHLORIDE 1000 ML: 9 INJECTION, SOLUTION INTRAVENOUS at 12:24

## 2017-12-21 RX ADMIN — HYDROMORPHONE HYDROCHLORIDE 0.5 MG: 1 INJECTION, SOLUTION INTRAMUSCULAR; INTRAVENOUS; SUBCUTANEOUS at 12:27

## 2017-12-21 RX ADMIN — IOPAMIDOL 75 ML: 755 INJECTION, SOLUTION INTRAVENOUS at 12:48

## 2017-12-21 RX ADMIN — ONDANSETRON 4 MG: 2 INJECTION INTRAMUSCULAR; INTRAVENOUS at 12:24

## 2017-12-21 RX ADMIN — HYDROMORPHONE HYDROCHLORIDE 0.5 MG: 1 INJECTION, SOLUTION INTRAMUSCULAR; INTRAVENOUS; SUBCUTANEOUS at 13:24

## 2017-12-21 RX ADMIN — SODIUM CHLORIDE 60 ML: 9 INJECTION, SOLUTION INTRAVENOUS at 12:48

## 2017-12-21 ASSESSMENT — ENCOUNTER SYMPTOMS
CONSTIPATION: 0
BACK PAIN: 0
DIZZINESS: 0
FLANK PAIN: 0
DIARRHEA: 0
HEADACHES: 0
ABDOMINAL PAIN: 1
SORE THROAT: 0
RHINORRHEA: 0
HEMATURIA: 0
ARTHRALGIAS: 0
COUGH: 0
MYALGIAS: 0
ABDOMINAL DISTENTION: 0
LIGHT-HEADEDNESS: 0
VOMITING: 0
FEVER: 0
DYSURIA: 0
FATIGUE: 0
NAUSEA: 1
CHILLS: 0
FREQUENCY: 0
SHORTNESS OF BREATH: 0

## 2017-12-21 NOTE — TELEPHONE ENCOUNTER
Per RM if her pain is that bad she should present to the ER. Spoke with patient who stated her pain is still severe and she is agreeable to present to ER.  Frida Navas, LYSSA

## 2017-12-21 NOTE — ED AVS SNAPSHOT
[facility] Emergency Department    [address] [place] [address]    Phone:  [phone]    Fax:  [phone]                                       Radha Beckwith   MRN: [id number]    Department:  [facility] Emergency Department   Date of Visit:  12/21/2017           Patient Information     Date Of Birth          1975        Your diagnoses for this visit were:     Abdominal pain, acute, periumbilical        You were seen by Janae Dhaliwal MD.      Follow-up Information     Schedule an appointment as soon as possible for a visit with [facility], Kassandra Hong.    Contact information:    [address]  [place] [address] [phone]          Discharge Instructions       The urine, labs, CT scan are all unremarkable.    Try to drink plenty of fluids. Zofran if needed for nausea.    I will give you a very limited amount of pain medications to use if needed for severe pain. Otherwise, use ibuprofen, Aleve or Tylenol for pain.    Follow-up in clinic for continued symptoms next week.    Return for worsening or concerns.    I hope you start to improve quickly and have a great [place]!!    Discharge References/Attachments     ABDOMINAL PAIN, UNKNOWN CAUSE, (FEMALE) (ENGLISH)      Future Appointments        Provider Department Dept Phone Center    12/29/2017 3:00 PM Clemente Chino MD, MD [facility] [phone] [facility]      24 Hour Appointment Hotline       To make an appointment at any [facility], call [phone] ([phone]). If you don't have a family doctor or clinic, we will help you find one. [facility] are conveniently located to serve the needs of you and your family.             Review of your medicines      START taking        Dose / Directions Last dose taken    HYDROcodone-acetaminophen 5-325 MG per tablet   Commonly known as:  NORCO   Dose:  1-2 tablet   Quantity:  6 tablet        Take 1-2 tablets by  mouth every 6 hours as needed for moderate to severe pain   Refills:  0          Our records show that you are taking the medicines listed below. If these are incorrect, please call your family doctor or clinic.        Dose / Directions Last dose taken    cetirizine 10 MG tablet   Commonly known as:  zyrTEC   Dose:  10 mg   Quantity:  30 tablet        Take 1 tablet (10 mg) by mouth every evening   Refills:  3        LORazepam 1 MG tablet   Commonly known as:  ATIVAN   Dose:  0.5 mg   Quantity:  20 tablet        Take 0.5 tablets (0.5 mg) by mouth every 8 hours as needed for anxiety   Refills:  0        omeprazole 20 MG tablet   Dose:  20 mg   Quantity:  60 tablet        Take 1 tablet (20 mg) by mouth 2 times daily Take 30-60 minutes before a meal.   Refills:  3        ondansetron 4 MG tablet   Commonly known as:  ZOFRAN   Quantity:  18 tablet        TAKE ONE TABLET BY MOUTH EVERY 6 HOURS AS NEEDED FOR NAUSEA OR VOMITING   Refills:  10        order for DME   Quantity:  1 each        Blood pressure cuff   Refills:  0        propranolol 20 MG tablet   Commonly known as:  INDERAL   Dose:  20 mg   Quantity:  180 tablet        Take 1 tablet (20 mg) by mouth 2 times daily   Refills:  1        QUEtiapine 50 MG tablet   Commonly known as:  SEROquel   Quantity:  60 tablet        TAKE ONE OR TWO TABLETS BY MOUTH NIGHTLY AS NEEDED   Refills:  3        senna-docusate 8.6-50 MG per tablet   Commonly known as:  SENOKOT-S;PERICOLACE   Dose:  1-2 tablet   Quantity:  30 tablet        Take 1-2 tablets by mouth daily as needed for constipation   Refills:  3        sertraline 100 MG tablet   Commonly known as:  ZOLOFT   Dose:  150 mg   Quantity:  135 tablet        Take 1.5 tablets (150 mg) by mouth daily   Refills:  1        sucralfate 1 GM tablet   Commonly known as:  CARAFATE   Quantity:  120 tablet        TAKE ONE TABLET BY MOUTH FOUR TIMES A DAY   Refills:  0        VITAMIN D (CHOLECALCIFEROL) PO        Take by mouth daily   Refills:  " 0                Prescriptions were sent or printed at these locations (1 Prescription)                   Other Prescriptions                Printed at Department/Unit printer (1 of 1)         HYDROcodone-acetaminophen (NORCO) 5-325 MG per tablet                Procedures and tests performed during your visit     CBC with platelets differential    CT Abdomen Pelvis w Contrast    Comprehensive metabolic panel    Give 20 ounces of water 15 minutes before CT of abdomen    Lipase    Peripheral IV: Standard    UA with Microscopic      Orders Needing Specimen Collection     None      Pending Results     No orders found from 12/19/2017 to 12/22/2017.            Pending Culture Results     No orders found from 12/19/2017 to 12/22/2017.            Pending Results Instructions     If you had any lab results that were not finalized at the time of your Discharge, you can call the ED Lab Result RN at [phone]. You will be contacted by this team for any positive Lab results or changes in treatment. The nurses are available 7 days a week from 10A to 6:30P.  You can leave a message 24 hours per day and they will return your call.        Thank you for choosing [facility]       Thank you for choosing [facility] for your care. Our goal is always to provide you with excellent care. Hearing back from our patients is one way we can continue to improve our services. Please take a few minutes to complete the written survey that you may receive in the mail after you visit with us. Thank you!        [employer] My [employer] Information     [employer] lets you send messages to your doctor, view your test results, renew your prescriptions, schedule appointments and more. To sign up, go to [email or web address] . Click on \"Log in\" on the left side of the screen, which will take you to the Welcome page. Then click on \"Sign up Now\" on the right side of the page.     You will be asked to enter the access code listed below, as well as some personal information. " Please follow the directions to create your username and password.     Your access code is: [email or web address]  Expires: 2017  2:08 PM     Your access code will  in 90 days. If you need help or a new code, please call your [facility] clinic or [phone].        Care EveryWhere ID     This is your Care EveryWhere ID. This could be used by other organizations to access your [facility] medical records  [id number]        Equal Access to Services     [facility] : Hadii quintin alexander hadasho Soomaali, waaxda luqadaha, qaybta kaalmada adeegyacrow, yadi amezcua . So [facility] [phone].    ATENCIÓN: Si habla español, tiene a rueda disposición servicios gratuitos de asistencia lingüística. Llame al [phone].    We comply with applicable federal civil rights laws and [place] laws. We do not discriminate on the basis of race, color, national origin, age, disability, sex, sexual orientation, or gender identity.            After Visit Summary       This is your record. Keep this with you and show to your community pharmacist(s) and doctor(s) at your next visit.

## 2017-12-21 NOTE — ED NOTES
Pt called triage nurse and was told to come to the ER.     Pt c/o abd pain since yesterday at 11 am.   Denies vomiting or Diarrhea.  Denies fevers.

## 2017-12-21 NOTE — ED AVS SNAPSHOT
[facility] Emergency Department    [address] [place] [address]    Phone:  [phone]    Fax:  [phone]                                       Radha Beckwith   MRN: [id number]    Department:  [facility] Emergency Department   Date of Visit:  12/21/2017           After Visit Summary Signature Page     I have received my discharge instructions, and my questions have been answered. I have discussed any challenges I see with this plan with the nurse or doctor.    ..........................................................................................................................................  Patient/Patient Representative Signature      ..........................................................................................................................................  Patient Representative Print Name and Relationship to Patient    ..................................................               ................................................  Date                                            Time    ..........................................................................................................................................  Reviewed by Signature/Title    ...................................................              ..............................................  Date                                                            Time

## 2017-12-21 NOTE — DISCHARGE INSTRUCTIONS
The urine, labs, CT scan are all unremarkable.    Try to drink plenty of fluids. Zofran if needed for nausea.    I will give you a very limited amount of pain medications to use if needed for severe pain. Otherwise, use ibuprofen, Aleve or Tylenol for pain.    Follow-up in clinic for continued symptoms next week.    Return for worsening or concerns.    I hope you start to improve quickly and have a great [place]!!

## 2017-12-21 NOTE — ED PROVIDER NOTES
"  History     Chief Complaint   Patient presents with     Abdominal Pain     HPI  Radha Beckwith is a 42 year old female, PMHx of GERD, anxiety and tobacco use, who presents to the ED for evaluation of abdominal pain. Around 11AM yesterday at work, patient had onset of burning abdominal pain. She localizes the pain to her umbilical area. She states it feels like there is a \"hole\" in her umbilical area. Pain radiates around her abdomen, primarily to her sides to around her back. Pain is worse when she sits up straight. She cannot identify any palliative factors. No change in pain with eating and has been able to eat since onset of pain. She also endorses nausea. No vomiting. She had a loose, watery stool this morning but no black or bloody stools. No urinary symptoms. No vaginal bleeding or discharge. No chest pain or SOB. No fevers or chills. No numbness or paresthesias in her legs. Of note, patient had laparoscopic vaginal hysterectomy on 10/30/17. She endorses infrequent alcohol use, most recently four days ago. Patient had abdominal CT 03/2017 which showed diffuse fatty liver but no other pathology, aorta was normal. She had an upper endoscopy 04/2017, which was also normal.     Problem List:    Patient Active Problem List    Diagnosis Date Noted     Papanicolaou smear of cervix with low grade squamous intraepithelial lesion (LGSIL) 07/07/2017     Priority: Medium     7/7/17 LSIL pap/+ HR HPV (not 16 or 18). Plan: colp bef 10/7/17.   8/10/17 [place]:Focal squamous atypia; cannot exclude koilocytosis   Plan: hysterectomy.  10/30/17 Hysterectomy surgery       Opioid dependence in remission (H) 08/02/2015     Priority: Medium     On suboxone treatement with Garland Rodriguez MD, [place].  (Noted in 2015).  Has been off suboxone since at least June of 2017. 8/9/2017        Anxiety attack 07/31/2015     Priority: Medium     Hypokalemia 06/23/2015     Priority: Medium     Atypical chest pain 06/23/2015     Priority: " Medium     Tobacco abuse 06/23/2015     Priority: Medium     Hyperlipidemia LDL goal <100 01/29/2014     Priority: Medium     Ingrowing nail 01/09/2014     Priority: Medium     Anxiety 08/19/2013     Priority: Medium     GERD (gastroesophageal reflux disease) 07/28/2010     Priority: Medium     Takes omeprazole and metoclopramide       Restless legs 07/28/2010     Priority: Medium        Past Medical History:    Past Medical History:   Diagnosis Date     Abdominal pain, right lower quadrant 03/09/08     Anxiety attack 7/31/2015     Dehydration      Gastric ulcer 7/31/2015     GERD (gastroesophageal reflux disease) 7/28/2010     Opioid dependence in remission (H) 8/2/2015     Other and unspecified ovarian cyst      Papanicolaou smear of cervix with low grade squamous intraepithelial lesion (LGSIL) 07/07/2017       Past Surgical History:    Past Surgical History:   Procedure Laterality Date     BIOPSY CERVICAL, LOCAL EXCISION, SINGLE/MULTIPLE N/A 8/10/2017    Procedure: BIOPSY CERVICAL, LOCAL EXCISION, SINGLE/MULTIPLE;;  Surgeon: Michael Chandler MD;  Location: [facility] OR     COLPOSCOPY, BIOPSY, COMBINED N/A 8/10/2017    Procedure: COMBINED COLPOSCOPY, BIOPSY;  Colposcopy with Cervical Biopsies and Endometrial Biopsy, Exam with Ultrasound;  Surgeon: Michael Chandler MD;  Location: [facility] OR     ESOPHAGOSCOPY, GASTROSCOPY, DUODENOSCOPY (EGD), COMBINED N/A 4/17/2017    Procedure: COMBINED ESOPHAGOSCOPY, GASTROSCOPY, DUODENOSCOPY (EGD);  Surgeon: Ibrahima Esposito MD;  Location: [facility] GI     EXAM UNDER ANESTHESIA PELVIC N/A 8/10/2017    Procedure: EXAM UNDER ANESTHESIA PELVIC;;  Surgeon: Michael Chandler MD;  Location: [facility] OR     HC UGI ENDOSCOPY, SIMPLE EXAM  01/07/08     HYSTERECTOMY       LAPAROSCOPIC HYSTERECTOMY TOTAL N/A 10/30/2017    Procedure: LAPAROSCOPIC HYSTERECTOMY TOTAL;  LAPAROSCOPIC HYSTERECTOMY TOTAL POSSIBLE SALPINGO-OOPHERECTOMY (BILATERAL);  Surgeon: Michael Chandler MD;   Location: [facility] OR       Family History:    Family History   Problem Relation Age of Onset     Depression Mother      Respiratory Mother      Chronic Obstructive Pulmonary Disease Mother      CEREBROVASCULAR DISEASE Father      Brain anyeurism     Adrenal Disorder Other      Chronic Obstructive Pulmonary Disease Other        Social History:  Marital Status:  Single [1]  Social History   Substance Use Topics     Smoking status: Current Every Day Smoker     Packs/day: 0.25     Years: 20.00     Types: Cigarettes     Smokeless tobacco: Never Used      Comment: 5-6 cigs daily     Alcohol use Yes      Comment: occasional drinks        Medications:      VITAMIN D, CHOLECALCIFEROL, PO   HYDROcodone-acetaminophen (NORCO) 5-325 MG per tablet   LORazepam (ATIVAN) 1 MG tablet   sucralfate (CARAFATE) 1 GM tablet   senna-docusate (SENOKOT-S;PERICOLACE) 8.6-50 MG per tablet   sertraline (ZOLOFT) 100 MG tablet   propranolol (INDERAL) 20 MG tablet   QUEtiapine (SEROQUEL) 50 MG tablet   ondansetron (ZOFRAN) 4 MG tablet   cetirizine (ZYRTEC) 10 MG tablet   omeprazole 20 MG tablet   order for DME     Review of Systems   Constitutional: Negative for chills, fatigue and fever.   HENT: Negative for congestion, rhinorrhea and sore throat.    Respiratory: Negative for cough and shortness of breath.    Cardiovascular: Negative for chest pain and leg swelling.   Gastrointestinal: Positive for abdominal pain and nausea. Negative for abdominal distention, constipation, diarrhea and vomiting.   Genitourinary: Negative for dysuria, flank pain, frequency, hematuria, vaginal bleeding and vaginal discharge.   Musculoskeletal: Negative for arthralgias, back pain and myalgias.   Skin: Negative for rash.   Neurological: Negative for dizziness, light-headedness and headaches.       Physical Exam   BP: (!) 133/103  Pulse: 117  Temp: 98.2  F (36.8  C)  Resp: 18  Weight: 70.3 kg (155 lb)  SpO2: 100 %      Physical Exam   Constitutional: Vital signs are  normal. She appears well-developed and well-nourished.  Non-toxic appearance.   Anxious, uncomfortable appearing female sitting in the bed holding onto her abdomen.   HENT:   Head: Normocephalic and atraumatic.   Nose: Nose normal.   Mouth/Throat: Oropharynx is clear and moist and mucous membranes are normal.   Eyes: Conjunctivae and EOM are normal. Pupils are equal, round, and reactive to light. No scleral icterus.   Neck: Neck supple.   Cardiovascular: Regular rhythm, S1 normal, S2 normal, normal heart sounds, intact distal pulses and normal pulses.  Tachycardia present.    No murmur heard.  Pulmonary/Chest: Effort normal. No respiratory distress. She has no decreased breath sounds. She has no wheezes. She has no rhonchi.   Abdominal: Soft. Normal appearance and bowel sounds are normal. She exhibits no mass. There is tenderness in the epigastric area and periumbilical area. There is no rebound and no CVA tenderness. No hernia.       Musculoskeletal: Normal range of motion. She exhibits no edema.   Lymphadenopathy:     She has no cervical adenopathy.   Neurological: She is alert. She has normal strength. No sensory deficit.   Skin: Skin is warm and dry. No rash noted. She is not diaphoretic.   Psychiatric:   Anxious   Vitals reviewed.      ED Course       Procedures         Results for orders placed or performed during the hospital encounter of 12/21/17   CT Abdomen Pelvis w Contrast    Narrative    CT ABDOMEN/PELVIS WITH CONTRAST December 21, 2017 12:56 PM     HISTORY: Periumbilical pain, recent laparoscopic hysterectomy.     TECHNIQUE: 80mL Isovue-370. Radiation dose for this scan was reduced  using automated exposure control, adjustment of the mA and/or kV  according to patient size, or iterative reconstruction technique.    COMPARISON: 3/24/2017.    FINDINGS: No evidence of diverticulitis or small bowel obstruction.  Unremarkable appendix. Fatty liver infiltration. 2.3 cm left ovarian  cystic lesion. Nothing  else acute is seen in the upper abdominal  organs.       Impression    IMPRESSION:  1. No acute inflammatory process identified.   2. Fatty liver.  3. Small left ovarian cystic lesion.    KAY SHORE MD   CBC with platelets differential   Result Value Ref Range    WBC 7.9 4.0 - 11.0 10e9/L    RBC Count 4.32 3.8 - 5.2 10e12/L    Hemoglobin 13.6 11.7 - 15.7 g/dL    Hematocrit 42.0 35.0 - 47.0 %    MCV 97 78 - 100 fl    MCH 31.5 26.5 - 33.0 pg    MCHC 32.4 31.5 - 36.5 g/dL    RDW 12.7 10.0 - 15.0 %    Platelet Count 199 150 - 450 10e9/L    Diff Method Automated Method     % Neutrophils 70.1 %    % Lymphocytes 22.4 %    % Monocytes 6.0 %    % Eosinophils 1.1 %    % Basophils 0.3 %    % Immature Granulocytes 0.1 %    Absolute Neutrophil 5.5 1.6 - 8.3 10e9/L    Absolute Lymphocytes 1.8 0.8 - 5.3 10e9/L    Absolute Monocytes 0.5 0.0 - 1.3 10e9/L    Absolute Eosinophils 0.1 0.0 - 0.7 10e9/L    Absolute Basophils 0.0 0.0 - 0.2 10e9/L    Abs Immature Granulocytes 0.0 0 - 0.4 10e9/L   Comprehensive metabolic panel   Result Value Ref Range    Sodium 137 133 - 144 mmol/L    Potassium 3.8 3.4 - 5.3 mmol/L    Chloride 105 94 - 109 mmol/L    Carbon Dioxide 25 20 - 32 mmol/L    Anion Gap 7 3 - 14 mmol/L    Glucose 118 (H) 70 - 99 mg/dL    Urea Nitrogen 11 7 - 30 mg/dL    Creatinine 0.65 0.52 - 1.04 mg/dL    GFR Estimate >90 >60 mL/min/1.7m2    GFR Estimate If Black >90 >60 mL/min/1.7m2    Calcium 9.3 8.5 - 10.1 mg/dL    Bilirubin Total 0.3 0.2 - 1.3 mg/dL    Albumin 4.1 3.4 - 5.0 g/dL    Protein Total 7.9 6.8 - 8.8 g/dL    Alkaline Phosphatase 70 40 - 150 U/L    ALT 31 0 - 50 U/L    AST 20 0 - 45 U/L   Lipase   Result Value Ref Range    Lipase 126 73 - 393 U/L   UA with Microscopic   Result Value Ref Range    Color Urine Straw     Appearance Urine Clear     Glucose Urine Negative NEG^Negative mg/dL    Bilirubin Urine Negative NEG^Negative    Ketones Urine Negative NEG^Negative mg/dL    Specific Gravity Urine 1.015 1.003 - 1.035     Blood Urine Negative NEG^Negative    pH Urine 6.0 5.0 - 7.0 pH    Protein Albumin Urine Negative NEG^Negative mg/dL    Urobilinogen mg/dL 0.0 0.0 - 2.0 mg/dL    Nitrite Urine Negative NEG^Negative    Leukocyte Esterase Urine Negative NEG^Negative    Source Midstream Urine     WBC Urine 0 0 - 2 /HPF    RBC Urine 0 0 - 2 /HPF    Squamous Epithelial /HPF Urine <1 0 - 1 /HPF    Mucous Urine Present (A) NEG^Negative /LPF         Assessments & Plan (with Medical Decision Making)  Patient is a 42 year old female with PMHx of GERD, s/p laparoscopic vaginal hysterectomy 6 weeks ago, who presents to the ED for evaluation of abdominal pain. She has had constant, burning umbilical abdominal pain since yesterday morning. Patient has had nausea but no other symptoms. Patient tachycardic on arrival to the ED but vitals otherwise stable. She has been afebrile since onset of pain. On exam, she has moderate tenderness over umbilical region. Her umbilical incision site appears well healed, no erythema or drainage.  No obvious mass.    Given recent surgery, there was concern for hernia, though pancreatitis, cholecystitis, ischemic bowel, diverticulitis, SBO, AAA, nephrolithiasis also considered.   IV placed.  Fluids and pain medications.  Labs sent.  Plan CT scan of the abdomen.    Labs, including CBC, CMP and lipase all WNL. Urinalysis is unremarkable. CT abdomen pelvis shows small left ovarian cyst but otherwise no acute process causing her symptoms. Patient given dilaudid and zofran in ED with some relief of symptoms.  I discussed the results with the patient and reassured her that no acute findings were noted.  I am unsure of the exact cause of her pain at this point, but I would like her to continue to monitor.  She was given a tiny amount of Vicodin for severe pain as she has had a history of narcotic abuse in the past.  Follow-up with primary care provider for continued symptoms and return for significant worsening, changes or  concerns.     I have reviewed the nursing notes.    I have reviewed the findings, diagnosis, plan and need for follow up with the patient.    Discharge Medication List as of 12/21/2017  2:28 PM      START taking these medications    Details   HYDROcodone-acetaminophen (NORCO) 5-325 MG per tablet Take 1-2 tablets by mouth every 6 hours as needed for moderate to severe pain, Disp-6 tablet, R-0, Local Print             Final diagnoses:   Abdominal pain, acute, periumbilical     Sarah Menchaca, MS3  [facility]    I, Sarah Menchaca, am serving as a scribe; to document services personally performed by Janae Dhaliwal - based on data collection and the provider's statements to me.    Provider Disclosure:  I agree with above History, Review of Systems, Physical exam and Plan. I have reviewed the content of the documentation and have edited it as needed. I have personally performed the services documented here and the documentation accurately represents those services and the decisions I have made.    Janae Dhaliwal MD     12/21/2017   [facility] EMERGENCY DEPARTMENT     Janae Dhaliwal MD  12/21/17 1710

## 2017-12-22 ENCOUNTER — APPOINTMENT (OUTPATIENT)
Dept: ULTRASOUND IMAGING | Facility: CLINIC | Age: 42
End: 2017-12-22
Attending: FAMILY MEDICINE
Payer: COMMERCIAL

## 2017-12-22 ENCOUNTER — HOSPITAL ENCOUNTER (EMERGENCY)
Facility: CLINIC | Age: 42
Discharge: HOME | End: 2017-12-22
Attending: FAMILY MEDICINE | Admitting: FAMILY MEDICINE
Payer: COMMERCIAL

## 2017-12-22 ENCOUNTER — TELEPHONE (OUTPATIENT)
Dept: FAMILY MEDICINE | Facility: CLINIC | Age: 42
End: 2017-12-22

## 2017-12-22 VITALS
BODY MASS INDEX: 27.11 KG/M2 | HEART RATE: 71 BPM | RESPIRATION RATE: 16 BRPM | WEIGHT: 162.9 LBS | DIASTOLIC BLOOD PRESSURE: 87 MMHG | OXYGEN SATURATION: 100 % | TEMPERATURE: 96.5 F | SYSTOLIC BLOOD PRESSURE: 132 MMHG

## 2017-12-22 DIAGNOSIS — R10.84 ABDOMINAL PAIN, GENERALIZED: ICD-10-CM

## 2017-12-22 LAB
ALBUMIN SERPL-MCNC: 3.9 G/DL (ref 3.4–5)
ALP SERPL-CCNC: 65 U/L (ref 40–150)
ALT SERPL W P-5'-P-CCNC: 28 U/L (ref 0–50)
ANION GAP SERPL CALCULATED.3IONS-SCNC: 7 MMOL/L (ref 3–14)
AST SERPL W P-5'-P-CCNC: 18 U/L (ref 0–45)
BASOPHILS # BLD AUTO: 0 10E9/L (ref 0–0.2)
BASOPHILS NFR BLD AUTO: 0.6 %
BILIRUB SERPL-MCNC: 0.3 MG/DL (ref 0.2–1.3)
BUN SERPL-MCNC: 8 MG/DL (ref 7–30)
CALCIUM SERPL-MCNC: 9 MG/DL (ref 8.5–10.1)
CHLORIDE SERPL-SCNC: 105 MMOL/L (ref 94–109)
CO2 SERPL-SCNC: 25 MMOL/L (ref 20–32)
CREAT SERPL-MCNC: 0.53 MG/DL (ref 0.52–1.04)
DIFFERENTIAL METHOD BLD: NORMAL
EOSINOPHIL # BLD AUTO: 0.2 10E9/L (ref 0–0.7)
EOSINOPHIL NFR BLD AUTO: 2.1 %
ERYTHROCYTE [DISTWIDTH] IN BLOOD BY AUTOMATED COUNT: 12.7 % (ref 10–15)
GFR SERPL CREATININE-BSD FRML MDRD: >90 ML/MIN/1.7M2
GLUCOSE SERPL-MCNC: 99 MG/DL (ref 70–99)
HCT VFR BLD AUTO: 41.8 % (ref 35–47)
HGB BLD-MCNC: 13.6 G/DL (ref 11.7–15.7)
IMM GRANULOCYTES # BLD: 0 10E9/L (ref 0–0.4)
IMM GRANULOCYTES NFR BLD: 0.1 %
LIPASE SERPL-CCNC: 120 U/L (ref 73–393)
LYMPHOCYTES # BLD AUTO: 2 10E9/L (ref 0.8–5.3)
LYMPHOCYTES NFR BLD AUTO: 28.9 %
MCH RBC QN AUTO: 31.5 PG (ref 26.5–33)
MCHC RBC AUTO-ENTMCNC: 32.5 G/DL (ref 31.5–36.5)
MCV RBC AUTO: 97 FL (ref 78–100)
MONOCYTES # BLD AUTO: 0.4 10E9/L (ref 0–1.3)
MONOCYTES NFR BLD AUTO: 6.1 %
NEUTROPHILS # BLD AUTO: 4.4 10E9/L (ref 1.6–8.3)
NEUTROPHILS NFR BLD AUTO: 62.2 %
PLATELET # BLD AUTO: 214 10E9/L (ref 150–450)
POTASSIUM SERPL-SCNC: 3.8 MMOL/L (ref 3.4–5.3)
PROT SERPL-MCNC: 7.5 G/DL (ref 6.8–8.8)
RBC # BLD AUTO: 4.32 10E12/L (ref 3.8–5.2)
SODIUM SERPL-SCNC: 137 MMOL/L (ref 133–144)
WBC # BLD AUTO: 7.1 10E9/L (ref 4–11)

## 2017-12-22 PROCEDURE — 85025 COMPLETE CBC W/AUTO DIFF WBC: CPT | Performed by: FAMILY MEDICINE

## 2017-12-22 PROCEDURE — 76705 ECHO EXAM OF ABDOMEN: CPT

## 2017-12-22 PROCEDURE — 80053 COMPREHEN METABOLIC PANEL: CPT | Performed by: FAMILY MEDICINE

## 2017-12-22 PROCEDURE — 99285 EMERGENCY DEPT VISIT HI MDM: CPT | Mod: Z6 | Performed by: FAMILY MEDICINE

## 2017-12-22 PROCEDURE — 96374 THER/PROPH/DIAG INJ IV PUSH: CPT | Performed by: FAMILY MEDICINE

## 2017-12-22 PROCEDURE — 83690 ASSAY OF LIPASE: CPT | Performed by: FAMILY MEDICINE

## 2017-12-22 PROCEDURE — 99285 EMERGENCY DEPT VISIT HI MDM: CPT | Mod: 25 | Performed by: FAMILY MEDICINE

## 2017-12-22 PROCEDURE — 25000128 H RX IP 250 OP 636: Performed by: FAMILY MEDICINE

## 2017-12-22 PROCEDURE — 96375 TX/PRO/DX INJ NEW DRUG ADDON: CPT | Performed by: FAMILY MEDICINE

## 2017-12-22 RX ORDER — LORAZEPAM 2 MG/ML
1 INJECTION INTRAMUSCULAR ONCE
Status: COMPLETED | OUTPATIENT
Start: 2017-12-22 | End: 2017-12-22

## 2017-12-22 RX ORDER — ONDANSETRON 2 MG/ML
4 INJECTION INTRAMUSCULAR; INTRAVENOUS EVERY 30 MIN PRN
Status: DISCONTINUED | OUTPATIENT
Start: 2017-12-22 | End: 2017-12-22 | Stop reason: HOSPADM

## 2017-12-22 RX ORDER — POLYETHYLENE GLYCOL 3350 17 G/17G
POWDER, FOR SOLUTION ORAL
COMMUNITY
Start: 2017-12-22 | End: 2017-12-29

## 2017-12-22 RX ORDER — FENTANYL CITRATE 50 UG/ML
50-100 INJECTION, SOLUTION INTRAMUSCULAR; INTRAVENOUS
Status: DISCONTINUED | OUTPATIENT
Start: 2017-12-22 | End: 2017-12-22 | Stop reason: HOSPADM

## 2017-12-22 RX ORDER — KETOROLAC TROMETHAMINE 30 MG/ML
30 INJECTION, SOLUTION INTRAMUSCULAR; INTRAVENOUS ONCE
Status: COMPLETED | OUTPATIENT
Start: 2017-12-22 | End: 2017-12-22

## 2017-12-22 RX ADMIN — FENTANYL CITRATE 50 MCG: 50 INJECTION, SOLUTION INTRAMUSCULAR; INTRAVENOUS at 16:32

## 2017-12-22 RX ADMIN — LORAZEPAM 1 MG: 2 INJECTION INTRAMUSCULAR; INTRAVENOUS at 16:30

## 2017-12-22 RX ADMIN — KETOROLAC TROMETHAMINE 30 MG: 30 INJECTION, SOLUTION INTRAMUSCULAR at 15:41

## 2017-12-22 RX ADMIN — ONDANSETRON 4 MG: 2 INJECTION INTRAMUSCULAR; INTRAVENOUS at 15:44

## 2017-12-22 ASSESSMENT — ENCOUNTER SYMPTOMS
VOMITING: 0
DIFFICULTY URINATING: 0
ABDOMINAL PAIN: 1
BLOOD IN STOOL: 0
NAUSEA: 1
DYSURIA: 0

## 2017-12-22 NOTE — ED AVS SNAPSHOT
" [facility] Emergency Department    [address] [place] [address]    Phone:  [phone]    Fax:  [phone]                                       Radha Beckwith   MRN: [id number]    Department:  [facility] Emergency Department   Date of Visit:  12/22/2017           Patient Information     Date Of Birth          1975        Your diagnoses for this visit were:     Abdominal pain, generalized upper abd       You were seen by Reinaldo Marroquin MD.      Follow-up Information     Follow up with Clemente Chino MD.    Specialty:  Family Practice    Contact information:    [address] [place] [id number] [phone]          Discharge Instructions       Use the MiraLAX to \"clean out\".  Let's take constipation/colonic spasm out of the equation.  Your lab work, CT scan and ultrasound were all reassuring today/yesterday.  Avoid fatty foods, in case this is coming from your gallbladder not functioning as it should.  If your pain persists after the Miralax clean out, I suggest a HIDA scan to evaluate your gallbladder function.  Dr. Chino can help arrange this.  See him in clinic next week if your problems persist.  If that is normal, he may have you see GI to help sort this out.  It was nice visiting with you this afternoon.  I hope you feel better soon and are able to enjoy [place].    Thank you for choosing [facility]. We appreciate the opportunity to meet your urgent medical needs. Please let us know if we could have done anything to make your stay more satisfying.    After discharge, please closely monitor for any new or worsening symptoms. Return to the Emergency Department if you develop any acute worsening signs or symptoms.    If you received an opiate pain medication or sedative during your visit, please do not drive for at least 8 hours.     If you had lab work, cultures or imaging studies done during your stay, the final results may " "still be pending. We will call you if your plan of care needs to change. However, if you are not improving as expected, please follow up with your primary care provider or clinic.     Start any prescription medications that were prescribed to you and take them as directed.     Please see additional handouts that may be pertinent to your condition.            Future Appointments        Provider Department Dept Phone Center    12/29/2017 3:00 PM Clemente Chino MD, MD [facility] [phone] [facility]      24 Hour Appointment Hotline       To make an appointment at any [facility], call [phone] ([phone]). If you don't have a family doctor or clinic, we will help you find one. [facility] are conveniently located to serve the needs of you and your family.             Review of your medicines      START taking        Dose / Directions Last dose taken    polyethylene glycol powder   Commonly known as:  MIRALAX        1 capful mixed in 8 oz of fluid up to every 15 minutes as needed to \"clean out\".  Titrate to effect.   Refills:  0          Our records show that you are taking the medicines listed below. If these are incorrect, please call your family doctor or clinic.        Dose / Directions Last dose taken    cetirizine 10 MG tablet   Commonly known as:  zyrTEC   Dose:  10 mg   Quantity:  30 tablet        Take 1 tablet (10 mg) by mouth every evening   Refills:  3        HYDROcodone-acetaminophen 5-325 MG per tablet   Commonly known as:  NORCO   Dose:  1-2 tablet   Quantity:  6 tablet        Take 1-2 tablets by mouth every 6 hours as needed for moderate to severe pain   Refills:  0        LORazepam 1 MG tablet   Commonly known as:  ATIVAN   Dose:  0.5 mg   Quantity:  20 tablet        Take 0.5 tablets (0.5 mg) by mouth every 8 hours as needed for anxiety   Refills:  0        omeprazole 20 MG tablet   Dose:  20 mg   Quantity:  60 tablet        Take 1 tablet (20 mg) by mouth 2 " times daily Take 30-60 minutes before a meal.   Refills:  3        ondansetron 4 MG tablet   Commonly known as:  ZOFRAN   Quantity:  18 tablet        TAKE ONE TABLET BY MOUTH EVERY 6 HOURS AS NEEDED FOR NAUSEA OR VOMITING   Refills:  10        order for DME   Quantity:  1 each        Blood pressure cuff   Refills:  0        propranolol 20 MG tablet   Commonly known as:  INDERAL   Dose:  20 mg   Quantity:  180 tablet        Take 1 tablet (20 mg) by mouth 2 times daily   Refills:  1        QUEtiapine 50 MG tablet   Commonly known as:  SEROquel   Quantity:  60 tablet        TAKE ONE OR TWO TABLETS BY MOUTH NIGHTLY AS NEEDED   Refills:  3        senna-docusate 8.6-50 MG per tablet   Commonly known as:  SENOKOT-S;PERICOLACE   Dose:  1-2 tablet   Quantity:  30 tablet        Take 1-2 tablets by mouth daily as needed for constipation   Refills:  3        sertraline 100 MG tablet   Commonly known as:  ZOLOFT   Dose:  150 mg   Quantity:  135 tablet        Take 1.5 tablets (150 mg) by mouth daily   Refills:  1        sucralfate 1 GM tablet   Commonly known as:  CARAFATE   Quantity:  120 tablet        TAKE ONE TABLET BY MOUTH FOUR TIMES A DAY   Refills:  0        VITAMIN D (CHOLECALCIFEROL) PO        Take by mouth daily   Refills:  0                Prescriptions were sent or printed at these locations (1 Prescription)                   Other Prescriptions                Not Printed or Sent (1 of 1)         polyethylene glycol (MIRALAX) powder                Procedures and tests performed during your visit     Abdomen US, limited (RUQ only)    CBC with platelets differential    Comprehensive metabolic panel    Lipase    Peripheral IV: Standard      Orders Needing Specimen Collection     None      Pending Results     No orders found from 12/20/2017 to 12/23/2017.            Pending Culture Results     No orders found from 12/20/2017 to 12/23/2017.            Pending Results Instructions     If you had any lab results that were  "not finalized at the time of your Discharge, you can call the ED Lab Result RN at [phone]. You will be contacted by this team for any positive Lab results or changes in treatment. The nurses are available 7 days a week from 10A to 6:30P.  You can leave a message 24 hours per day and they will return your call.        Thank you for choosing [facility]       Thank you for choosing [facility] for your care. Our goal is always to provide you with excellent care. Hearing back from our patients is one way we can continue to improve our services. Please take a few minutes to complete the written survey that you may receive in the mail after you visit with us. Thank you!        [employer] Information     [employer] lets you send messages to your doctor, view your test results, renew your prescriptions, schedule appointments and more. To sign up, go to [email or web address] . Click on \"Log in\" on the left side of the screen, which will take you to the Welcome page. Then click on \"Sign up Now\" on the right side of the page.     You will be asked to enter the access code listed below, as well as some personal information. Please follow the directions to create your username and password.     Your access code is: [email or web address]  Expires: 2017  2:08 PM     Your access code will  in 90 days. If you need help or a new code, please call your [facility] clinic or [phone].        Care EveryWhere ID     This is your Care EveryWhere ID. This could be used by other organizations to access your [facility] medical records  [id number]        Equal Access to Services     DIONNE GALLARDO : Hadii quintin Holt, waaxda lujaylene, qaybta kaalyadi brown. So [facility] [phone].    ATENCIÓN: Si habla español, tiene a rueda disposición servicios gratuitos de asistencia lingüística. Llame al [phone].    We comply with applicable federal civil rights laws and [place] laws. We do not " discriminate on the basis of race, color, national origin, age, disability, sex, sexual orientation, or gender identity.            After Visit Summary       This is your record. Keep this with you and show to your community pharmacist(s) and doctor(s) at your next visit.

## 2017-12-22 NOTE — TELEPHONE ENCOUNTER
Reason for call:  Patient reporting a symptom    Symptom or request: abd pain    Duration (how long have symptoms been present):     Have you been treated for this before? Yes    Additional comments: pt was seen in the ED. Still having a lot of pain.    Phone Number patient can be reached at:      Best Time:      Can we leave a detailed message on this number:  YES    Call taken on 12/22/2017 at 11:10 AM by Xuan Vargas

## 2017-12-22 NOTE — ED PROVIDER NOTES
History     Chief Complaint   Patient presents with     Abdominal Pain     HPI  Radha Beckwith is a 42 year old female who presents to the emergency department with concerns of abdominal pain. Patient reports that 2 days ago this pain started while at work, it was a gradual onset, but it rapidly worsened. Her pain is mainly in the epigastric region and radiates to both sides and to her back. She has associated nausea. No vomiting or blood in stool. Patient has had a normal bowel movement today. She came into the emergency department yesterday, she states that the medication that was given here did not help her pain much. No difficulty urinating and dysuria.    He had normal labs and a negative abdominal CT yesterday.  Received small bumps of Dilaudid without pain relief.    Status post hysterectomy earlier this year.  Ovaries remain.    Chart review shows that she had a normal HIDA scan on June 19, 2013.  Normal right upper quadrant ultrasound on March 24, 2017 and had a normal upper endoscopy on April 17, 2017.      Problem List:    Patient Active Problem List    Diagnosis Date Noted     Papanicolaou smear of cervix with low grade squamous intraepithelial lesion (LGSIL) 07/07/2017     Priority: Medium     7/7/17 LSIL pap/+ HR HPV (not 16 or 18). Plan: colp bef 10/7/17.   8/10/17 [place]:Focal squamous atypia; cannot exclude koilocytosis   Plan: hysterectomy.  10/30/17 Hysterectomy surgery       Opioid dependence in remission (H) 08/02/2015     Priority: Medium     On suboxone treatement with Garland Rodriguez MD, [place].  (Noted in 2015).  Has been off suboxone since at least June of 2017. 8/9/2017        Anxiety attack 07/31/2015     Priority: Medium     Hypokalemia 06/23/2015     Priority: Medium     Atypical chest pain 06/23/2015     Priority: Medium     Tobacco abuse 06/23/2015     Priority: Medium     Hyperlipidemia LDL goal <100 01/29/2014     Priority: Medium     Ingrowing nail 01/09/2014     Priority:  Medium     Anxiety 08/19/2013     Priority: Medium     GERD (gastroesophageal reflux disease) 07/28/2010     Priority: Medium     Takes omeprazole and metoclopramide       Restless legs 07/28/2010     Priority: Medium        Past Medical History:    Past Medical History:   Diagnosis Date     Abdominal pain, right lower quadrant 03/09/08     Anxiety attack 7/31/2015     Dehydration      Gastric ulcer 7/31/2015     GERD (gastroesophageal reflux disease) 7/28/2010     Opioid dependence in remission (H) 8/2/2015     Other and unspecified ovarian cyst      Papanicolaou smear of cervix with low grade squamous intraepithelial lesion (LGSIL) 07/07/2017       Past Surgical History:    Past Surgical History:   Procedure Laterality Date     BIOPSY CERVICAL, LOCAL EXCISION, SINGLE/MULTIPLE N/A 8/10/2017    Procedure: BIOPSY CERVICAL, LOCAL EXCISION, SINGLE/MULTIPLE;;  Surgeon: Michael Chandler MD;  Location: [facility] OR     COLPOSCOPY, BIOPSY, COMBINED N/A 8/10/2017    Procedure: COMBINED COLPOSCOPY, BIOPSY;  Colposcopy with Cervical Biopsies and Endometrial Biopsy, Exam with Ultrasound;  Surgeon: Michael Chandler MD;  Location: [facility] OR     ESOPHAGOSCOPY, GASTROSCOPY, DUODENOSCOPY (EGD), COMBINED N/A 4/17/2017    Procedure: COMBINED ESOPHAGOSCOPY, GASTROSCOPY, DUODENOSCOPY (EGD);  Surgeon: Ibrahima Esposito MD;  Location: [facility] GI     EXAM UNDER ANESTHESIA PELVIC N/A 8/10/2017    Procedure: EXAM UNDER ANESTHESIA PELVIC;;  Surgeon: Michael Chandler MD;  Location: [facility] OR     HC UGI ENDOSCOPY, SIMPLE EXAM  01/07/08     HYSTERECTOMY       LAPAROSCOPIC HYSTERECTOMY TOTAL N/A 10/30/2017    Procedure: LAPAROSCOPIC HYSTERECTOMY TOTAL;  LAPAROSCOPIC HYSTERECTOMY TOTAL POSSIBLE SALPINGO-OOPHERECTOMY (BILATERAL);  Surgeon: Michael Chandler MD;  Location: [facility] OR       Family History:    Family History   Problem Relation Age of Onset     Depression Mother      Respiratory Mother      Chronic Obstructive Pulmonary  Disease Mother      CEREBROVASCULAR DISEASE Father      Brain anyeurism     Adrenal Disorder Other      Chronic Obstructive Pulmonary Disease Other        Social History:  Marital Status:  Single [1]  Social History   Substance Use Topics     Smoking status: Current Every Day Smoker     Packs/day: 0.25     Years: 20.00     Types: Cigarettes     Smokeless tobacco: Never Used      Comment: 5-6 cigs daily     Alcohol use Yes      Comment: occasional drinks        Medications:      polyethylene glycol (MIRALAX) powder   VITAMIN D, CHOLECALCIFEROL, PO   HYDROcodone-acetaminophen (NORCO) 5-325 MG per tablet   LORazepam (ATIVAN) 1 MG tablet   sucralfate (CARAFATE) 1 GM tablet   senna-docusate (SENOKOT-S;PERICOLACE) 8.6-50 MG per tablet   sertraline (ZOLOFT) 100 MG tablet   propranolol (INDERAL) 20 MG tablet   QUEtiapine (SEROQUEL) 50 MG tablet   ondansetron (ZOFRAN) 4 MG tablet   cetirizine (ZYRTEC) 10 MG tablet   omeprazole 20 MG tablet   order for DME         Review of Systems   Gastrointestinal: Positive for abdominal pain and nausea. Negative for blood in stool and vomiting.   Genitourinary: Negative for difficulty urinating and dysuria.   All other systems reviewed and are negative.      Physical Exam   BP: (!) 157/103  Pulse: 71  Heart Rate: 80  Temp: 97.2  F (36.2  C)  Resp: 18  Weight: 73.9 kg (162 lb 14.4 oz)  SpO2: 100 %      Physical Exam   Constitutional: She is oriented to person, place, and time. She appears well-developed and well-nourished. No distress.   HENT:   Mouth/Throat: Oropharynx is clear and moist.   Eyes: EOM are normal.   Neck: Neck supple.   Cardiovascular: Normal rate, regular rhythm and normal heart sounds.    Pulmonary/Chest: Effort normal and breath sounds normal. No respiratory distress.   Abdominal: Soft. Bowel sounds are normal. There is tenderness (Epigastric>RUQ/LUQ). There is no rebound and no guarding.   Musculoskeletal: Normal range of motion. She exhibits no edema.   Neurological:  "She is alert and oriented to person, place, and time. No cranial nerve deficit.   Skin: Skin is warm and dry.   Psychiatric: She has a normal mood and affect.       ED Course    IV placed.  Labs drawn.  We will compared to yesterday's labs to see if anything is changed.  Right upper quadrant ultrasound ordered to evaluate her gallbladder.  Toradol for pain.  Zofran for nausea.    Still waiting for her ultrasound.  Labs were unremarkable including a normal white count, comprehensive profile and lipase.  Fentanyl and Ativan given while waiting for the ultrasound results.    Right upper quadrant ultrasound was unremarkable.  She still complains of pain.  Looking through her chart, she does have a history of opioid dependence.  Recently had a hysterectomy and was on some narcotics postoperatively but they try to get her off quickly.  I do not think narcotics are indicated today and certainly could cause her more harm than good.  I wouldn't suggest narcotics to anyone, even if they haven't had problems in the past.    I reviewed her CT scan and she does have a fair amount of stool and gas in the right colon up to the hepatic flexure.  I suggested we use MiraLAX to \"clean out\" and then reevaluate if she still having pain.  She had a HIDA scan back in 2013 but for completeness sake, and because her pain has recurred, I think it would be reasonable to repeat that as an outpatient.  If that is unremarkable, she may need to see GI.      With a negative CT scan, negative ultrasound and normal labs both yesterday and today, we have ruled out lots of bad things.  I do not think narcotics are indicated and indeed if constipation is contributing to her pain, would only make things worse.  She was not real happy with not getting any pain medications but I explained to her the reasons behind that.         ED Course     Procedures               Critical Care time:  none             Results for orders placed or performed during the " hospital encounter of 12/22/17 (from the past 24 hour(s))   CBC with platelets differential   Result Value Ref Range    WBC 7.1 4.0 - 11.0 10e9/L    RBC Count 4.32 3.8 - 5.2 10e12/L    Hemoglobin 13.6 11.7 - 15.7 g/dL    Hematocrit 41.8 35.0 - 47.0 %    MCV 97 78 - 100 fl    MCH 31.5 26.5 - 33.0 pg    MCHC 32.5 31.5 - 36.5 g/dL    RDW 12.7 10.0 - 15.0 %    Platelet Count 214 150 - 450 10e9/L    Diff Method Automated Method     % Neutrophils 62.2 %    % Lymphocytes 28.9 %    % Monocytes 6.1 %    % Eosinophils 2.1 %    % Basophils 0.6 %    % Immature Granulocytes 0.1 %    Absolute Neutrophil 4.4 1.6 - 8.3 10e9/L    Absolute Lymphocytes 2.0 0.8 - 5.3 10e9/L    Absolute Monocytes 0.4 0.0 - 1.3 10e9/L    Absolute Eosinophils 0.2 0.0 - 0.7 10e9/L    Absolute Basophils 0.0 0.0 - 0.2 10e9/L    Abs Immature Granulocytes 0.0 0 - 0.4 10e9/L   Comprehensive metabolic panel   Result Value Ref Range    Sodium 137 133 - 144 mmol/L    Potassium 3.8 3.4 - 5.3 mmol/L    Chloride 105 94 - 109 mmol/L    Carbon Dioxide 25 20 - 32 mmol/L    Anion Gap 7 3 - 14 mmol/L    Glucose 99 70 - 99 mg/dL    Urea Nitrogen 8 7 - 30 mg/dL    Creatinine 0.53 0.52 - 1.04 mg/dL    GFR Estimate >90 >60 mL/min/1.7m2    GFR Estimate If Black >90 >60 mL/min/1.7m2    Calcium 9.0 8.5 - 10.1 mg/dL    Bilirubin Total 0.3 0.2 - 1.3 mg/dL    Albumin 3.9 3.4 - 5.0 g/dL    Protein Total 7.5 6.8 - 8.8 g/dL    Alkaline Phosphatase 65 40 - 150 U/L    ALT 28 0 - 50 U/L    AST 18 0 - 45 U/L   Lipase   Result Value Ref Range    Lipase 120 73 - 393 U/L   Abdomen US, limited (RUQ only)    Narrative    RIGHT UPPER QUADRANT ULTRASOUND 12/22/2017 4:52 PM    HISTORY:  Upper abdominal pain, negative abdominal CT yesterday.    COMPARISON: None.    FINDINGS:    Gallbladder:  Normal with no cholelithiasis, wall thickening or focal  tenderness.      Bile ducts:   CHD is normal diameter, measuring 6 mm in diameter.  No  intrahepatic biliary dilatation.    Liver:  Hyperechoic with  "decreased through transmission. No lesions  identified.    Pancreas:   Normal.     Right kidney:   Normal.       Impression    IMPRESSION:  Hepatic steatosis. Otherwise normal right upper quadrant  abdominal ultrasound.    SONIA BARAJAS MD             Assessments & Plan (with Medical Decision Making)    (R10.84) Abdominal pain, generalized  Comment: upper abd  Plan: See discussion above.  Verbal and written discharge instructions given.  MiraLAX to \"clean out\"  I sent a note to her primary physician, Dr. Chino, asking him to follow up with her.            I have reviewed the nursing notes.    I have reviewed the findings, diagnosis, plan and need for follow up with the patient.       New Prescriptions    POLYETHYLENE GLYCOL (MIRALAX) POWDER    1 capful mixed in 8 oz of fluid up to every 15 minutes as needed to \"clean out\".  Titrate to effect.       Final diagnoses:   Abdominal pain, generalized - upper abd     This document serves as a record of services personally performed by Reinaldo Marroquin MD. It was created on their behalf by Lucia Shelton, a trained medical scribe. The creation of this record is based on the provider's personal observations and the statements of the patient. This document has been checked and approved by the attending provider.  Note: Chart documentation done in part with Dragon Voice Recognition software. Although reviewed after completion, some word and grammatical errors may remain.  12/22/2017   [facility] EMERGENCY DEPARTMENT     Reinaldo Marroquin MD  12/22/17 [id number]    "

## 2017-12-22 NOTE — DISCHARGE INSTRUCTIONS
"Use the MiraLAX to \"clean out\".  Let's take constipation/colonic spasm out of the equation.  Your lab work, CT scan and ultrasound were all reassuring today/yesterday.  Avoid fatty foods, in case this is coming from your gallbladder not functioning as it should.  If your pain persists after the Miralax clean out, I suggest a HIDA scan to evaluate your gallbladder function.  Dr. Chino can help arrange this.  See him in clinic next week if your problems persist.  If that is normal, he may have you see GI to help sort this out.  It was nice visiting with you this afternoon.  I hope you feel better soon and are able to enjoy [place].    Thank you for choosing [facility]. We appreciate the opportunity to meet your urgent medical needs. Please let us know if we could have done anything to make your stay more satisfying.    After discharge, please closely monitor for any new or worsening symptoms. Return to the Emergency Department if you develop any acute worsening signs or symptoms.    If you received an opiate pain medication or sedative during your visit, please do not drive for at least 8 hours.     If you had lab work, cultures or imaging studies done during your stay, the final results may still be pending. We will call you if your plan of care needs to change. However, if you are not improving as expected, please follow up with your primary care provider or clinic.     Start any prescription medications that were prescribed to you and take them as directed.     Please see additional handouts that may be pertinent to your condition.          "

## 2017-12-22 NOTE — TELEPHONE ENCOUNTER
"Radha Beckwith is a 42 year old female who calls with increased pain.  Patient was triaged for this condition yesterday and was sent to the ED. The ED was unable to find anything wrong with her.    Today Radha states that her pain feels more like back labor. Its a burning \"massive\" constant pain that is above her belly button and more up under her breast. She rates at a 9/10.   The pain kept her awake most of the night. It was somewhat better this am when she woke (3/10). She proceeded to go to her job.  When She stood up at work her pain increased to 9/10.   She took two Vicodin last night and three Vicodin at approximately 10:30 today. It has not helped her pain at all.  She states she is unable to take Ibuprofen or any NSAIDS besides Tylenol.  Allergies:   Allergies   Allergen Reactions     Cafergot      12-            GI problems-     Compazine      Droperidol      Nubain [Nalbuphine Hcl]      Seasonal Allergies      Sumatriptan      vomits after giving herself a shot     Prochlorperazine Palpitations     Uncontrolled movement     RECOMMENDED DISPOSITION:  To ED, another person to drive   Will comply with recommendation: Yes  If further questions/concerns or if symptoms do not improve, worsen or new symptoms develop, call your PCP or [facility] as soon as possible.      Guideline used:  Telephone Triage Protocols for Nurses, Fifth Edition, Jyoti Dickerson RN    "

## 2017-12-22 NOTE — ED AVS SNAPSHOT
[facility] Emergency Department    [address] [place] [address]    Phone:  [phone]    Fax:  [phone]                                       Radha Beckwith   MRN: [id number]    Department:  [facility] Emergency Department   Date of Visit:  12/22/2017           After Visit Summary Signature Page     I have received my discharge instructions, and my questions have been answered. I have discussed any challenges I see with this plan with the nurse or doctor.    ..........................................................................................................................................  Patient/Patient Representative Signature      ..........................................................................................................................................  Patient Representative Print Name and Relationship to Patient    ..................................................               ................................................  Date                                            Time    ..........................................................................................................................................  Reviewed by Signature/Title    ...................................................              ..............................................  Date                                                            Time

## 2017-12-26 NOTE — PROGRESS NOTES
"  SUBJECTIVE:                                                    Radha Beckwith is a 42 year old female who presents to clinic today for the following health issues:      HPI    \"Hives\" popped up yesterday out of nowhere on arms, chest, face, sides, and legs will itch, and her throat.      Started yesterday when she got home from work at [employer].  Rash looks more like slight papular rash.  Face feels itchy.  Rash is itchy.  Mainly on arms and chest.      Denies new medications.      ED/UC Followup:    Facility:  [facility]   Date of visit: 12/21/17 and 12/22/17  Reason for visit: Abdominal pain-severe  Current Status: still in pain     Pt had 2 ER visits for abdominal pain.  Had CT scan, ultrasound, labs.  All were OK.  They recommended a repeat HIDA.  Had one years ago.  Grandmother has had her GB removed.    Pt reports pain is throughout her abdomen, radiates more to her back after standing.  Reports a burning sensation with it.  Feels like she has a hole above her belly button.  Always feel \"weird\" in her RUQ.  Almost feels like there's a foot there pushing on it all the time.      Eating makes it a lot worse.  No known correlation with type of food.  Takes at least 15 minutes after eating for the pain to worsen.      Anxiety Follow-Up    Status since last visit: Worsened, having lots of stuff going on with her son that she can't talk about (classified, he's in ).      Pulmonology indicated her mother has maybe a year to live.  Thinks her mother has some sort of dementia.      Other associated symptoms:  on her days off can sleep in until 1-2 pm    Complicating factors:   Significant life event: Yes-  See above   Current substance abuse: None  Depression symptoms: Yes-  Having additional stresses.  BO-7 SCORE 8/9/2017 10/16/2017 12/29/2017   Total Score 11 (moderate anxiety) 11 (moderate anxiety) 14 (moderate anxiety)   Total Score 11 11 14     Seroquel doesn't help her to sleep.  " Unable to stay asleep.  Up frequently during the night. Taking 100 mg nightly of this.       GAD7      PHQ-9 SCORE 8/9/2017 10/16/2017 12/29/2017   Total Score [place] 8 (Mild depression) 9 (Mild depression) 9 (Mild depression)   Total Score 8 9 9           Problem list and histories reviewed & adjusted, as indicated.  Additional history: as documented      Current Outpatient Prescriptions   Medication Sig Dispense Refill     atorvastatin (LIPITOR) 10 MG tablet Take 10 mg by mouth       LORazepam (ATIVAN) 1 MG tablet Take 0.5 tablets (0.5 mg) by mouth every 8 hours as needed for anxiety 20 tablet 0     sucralfate (CARAFATE) 1 GM tablet TAKE ONE TABLET BY MOUTH FOUR TIMES A  tablet 0     senna-docusate (SENOKOT-S;PERICOLACE) 8.6-50 MG per tablet Take 1-2 tablets by mouth daily as needed for constipation 30 tablet 3     sertraline (ZOLOFT) 100 MG tablet Take 1.5 tablets (150 mg) by mouth daily 135 tablet 1     propranolol (INDERAL) 20 MG tablet Take 1 tablet (20 mg) by mouth 2 times daily 180 tablet 1     QUEtiapine (SEROQUEL) 50 MG tablet TAKE ONE OR TWO TABLETS BY MOUTH NIGHTLY AS NEEDED 60 tablet 3     ondansetron (ZOFRAN) 4 MG tablet TAKE ONE TABLET BY MOUTH EVERY 6 HOURS AS NEEDED FOR NAUSEA OR VOMITING 18 tablet 10     cetirizine (ZYRTEC) 10 MG tablet Take 1 tablet (10 mg) by mouth every evening 30 tablet 3     order for DME Blood pressure cuff 1 each 0     omeprazole 20 MG tablet Take 1 tablet (20 mg) by mouth 2 times daily Take 30-60 minutes before a meal. 60 tablet 3     VITAMIN D, CHOLECALCIFEROL, PO Take by mouth daily       Recent Labs   Lab Test  12/22/17   1545  12/21/17   1214   08/10/17   1228   LDL   --    --    --   Cannot estimate LDL when triglyceride exceeds 400 mg/dL  164*   HDL   --    --    --   30*   TRIG   --    --    --   608*   ALT  28  31   --   35   CR  0.53  0.65   < >  0.67   GFRESTIMATED  >90  >90   < >  >90  Non  GFR Calc     GFRESTBLACK  >90  >90   < >   ">90   GFR Calc     POTASSIUM  3.8  3.8   --   4.1   TSH   --    --    --   1.01    < > = values in this interval not displayed.      BP Readings from Last 3 Encounters:   12/29/17 (!) 126/92   12/22/17 132/87   12/21/17 116/72    Wt Readings from Last 3 Encounters:   12/29/17 163 lb 11.2 oz (74.3 kg)   12/22/17 162 lb 14.4 oz (73.9 kg)   12/21/17 155 lb (70.3 kg)                   ROS:  Constitutional, HEENT, cardiovascular, pulmonary, gi and gu systems are negative, except as otherwise noted.  Laxatives did seem to help with her constipation.        OBJECTIVE:   BP (!) 126/92 (BP Location: Right arm, Patient Position: Chair, Cuff Size: Adult Regular)  Pulse 116  Temp 98.1  F (36.7  C) (Temporal)  Resp 16  Ht 5' 5\" (1.651 m)  Wt 163 lb 11.2 oz (74.3 kg)  LMP  (LMP Unknown)  BMI 27.24 kg/m2  Body mass index is 27.24 kg/(m^2).  GENERAL: healthy, alert and no distress  HENT: ear canals and TM's normal, nose and mouth without ulcers or lesions  NECK: no adenopathy, no asymmetry, masses, or scars and thyroid normal to palpation  RESP: lungs clear to auscultation - no rales, rhonchi or wheezes  CV: regular rate and rhythm, normal S1 S2, no S3 or S4, no murmur, click or rub, no peripheral edema and peripheral pulses strong  ABDOMEN: epigastric and RUQ tenderness, worst in epigastrium.    MS: no gross musculoskeletal defects noted, no edema    Diagnostic Test Results:  Results for orders placed or performed during the hospital encounter of 12/22/17   Abdomen US, limited (RUQ only)    Narrative    RIGHT UPPER QUADRANT ULTRASOUND 12/22/2017 4:52 PM    HISTORY:  Upper abdominal pain, negative abdominal CT yesterday.    COMPARISON: None.    FINDINGS:    Gallbladder:  Normal with no cholelithiasis, wall thickening or focal  tenderness.      Bile ducts:   CHD is normal diameter, measuring 6 mm in diameter.  No  intrahepatic biliary dilatation.    Liver:  Hyperechoic with decreased through transmission. No " lesions  identified.    Pancreas:   Normal.     Right kidney:   Normal.       Impression    IMPRESSION:  Hepatic steatosis. Otherwise normal right upper quadrant  abdominal ultrasound.    SONIA BARAJAS MD   CBC with platelets differential   Result Value Ref Range    WBC 7.1 4.0 - 11.0 10e9/L    RBC Count 4.32 3.8 - 5.2 10e12/L    Hemoglobin 13.6 11.7 - 15.7 g/dL    Hematocrit 41.8 35.0 - 47.0 %    MCV 97 78 - 100 fl    MCH 31.5 26.5 - 33.0 pg    MCHC 32.5 31.5 - 36.5 g/dL    RDW 12.7 10.0 - 15.0 %    Platelet Count 214 150 - 450 10e9/L    Diff Method Automated Method     % Neutrophils 62.2 %    % Lymphocytes 28.9 %    % Monocytes 6.1 %    % Eosinophils 2.1 %    % Basophils 0.6 %    % Immature Granulocytes 0.1 %    Absolute Neutrophil 4.4 1.6 - 8.3 10e9/L    Absolute Lymphocytes 2.0 0.8 - 5.3 10e9/L    Absolute Monocytes 0.4 0.0 - 1.3 10e9/L    Absolute Eosinophils 0.2 0.0 - 0.7 10e9/L    Absolute Basophils 0.0 0.0 - 0.2 10e9/L    Abs Immature Granulocytes 0.0 0 - 0.4 10e9/L   Comprehensive metabolic panel   Result Value Ref Range    Sodium 137 133 - 144 mmol/L    Potassium 3.8 3.4 - 5.3 mmol/L    Chloride 105 94 - 109 mmol/L    Carbon Dioxide 25 20 - 32 mmol/L    Anion Gap 7 3 - 14 mmol/L    Glucose 99 70 - 99 mg/dL    Urea Nitrogen 8 7 - 30 mg/dL    Creatinine 0.53 0.52 - 1.04 mg/dL    GFR Estimate >90 >60 mL/min/1.7m2    GFR Estimate If Black >90 >60 mL/min/1.7m2    Calcium 9.0 8.5 - 10.1 mg/dL    Bilirubin Total 0.3 0.2 - 1.3 mg/dL    Albumin 3.9 3.4 - 5.0 g/dL    Protein Total 7.5 6.8 - 8.8 g/dL    Alkaline Phosphatase 65 40 - 150 U/L    ALT 28 0 - 50 U/L    AST 18 0 - 45 U/L   Lipase   Result Value Ref Range    Lipase 120 73 - 393 U/L       ASSESSMENT/PLAN:     Tobacco Cessation:   reports that she has been smoking Cigarettes.  She has a 5.00 pack-year smoking history. She has never used smokeless tobacco.  Tobacco Cessation Action Plan: Information offered: Patient not interested at this time    BMI:  "  Estimated body mass index is 27.24 kg/(m^2) as calculated from the following:    Height as of this encounter: 5' 5\" (1.651 m).    Weight as of this encounter: 163 lb 11.2 oz (74.3 kg).   Weight management plan: Discussed healthy diet and exercise guidelines and patient will follow up in 6 months in clinic to re-evaluate.            ICD-10-CM    1. Abdominal pain, right upper quadrant R10.11 NM Hepatobiliary Scan w GB EF   2. Anxiety F41.9 LORazepam (ATIVAN) 1 MG tablet     QUEtiapine (SEROQUEL) 200 MG tablet     sertraline (ZOLOFT) 100 MG tablet   3. Gastroesophageal reflux disease, esophagitis presence not specified K21.9 omeprazole 20 MG tablet   4. S/P hysterectomy Z90.710 NM Hepatobiliary Scan w GB EF   5. Psychophysiological insomnia F51.04 QUEtiapine (SEROQUEL) 200 MG tablet   6. Chronic bilateral low back pain without sciatica M54.5 lidocaine (LIDODERM) 5 % Patch    G89.29    7. Rash and nonspecific skin eruption R21 triamcinolone (KENALOG) 0.1 % cream     1.  Unclear etiology for her pain.  She has had a fairly extensive workup, that has been fairly reassuring.  This could simply be reflux.  She is on appropriate treatment, and she had a negative EGD earlier this year.  I agree that a HIDA scan is reasonable to rule out gallbladder as a cause for her symptoms.  Irritable bowel related to her anxiety or idiopathic gastroparesis could also be possibilities.  If HIDA scan is negative, would have a low threshold to refer patient to gastroenterology.  2.  Not fully controlled.  Will try increased dose of sertraline and Seroquel to further improve her symptoms.  I would like to avoid excessive use of lorazepam, given her past history.  3.  Continue PPI.  Unclear if patient needs something further.  4.  This also might be contributing to her symptoms, possibly via adhesions.  If so, will need to determine how to best manage her symptoms going forward.  5.  Trial of the increased dose of Seroquel to help with " this.  Can increase further.  If needed.  6.  Trial of Lidoderm patch.  Patient did seem to be interested in receiving narcotic pain medications, but I am not willing to prescribe these today given her past history.  7.  Unclear etiology.  I do not feel that this is urticaria, nor do I feel that this is a typical drug eruption or contact dermatitis.  Given her associated itching, will recommend continued use of antihistamine and trial of triamcinolone cream.  If persistent, and not improving, would refer to dermatology or consider a skin biopsy.    Portions of this note were completed using Dragon dictation software.  Although reviewed, there may be typographical and other inadvertent errors that remain.               Patient Instructions   Thank you for visiting [facility]    Take an antihistamine (Claritin, Zyrtec, Allegra, Benadryl or similar generic - loratadine, cetirizine, fexofenadine, diphenhydramine) to help with your itching symptoms.     Get your HIDA scan done.    Let's see if an increased dose of Seroquel helps with your sleep and anxiety.    Increase sertraline to help with anxiety as well.    If change in your symptoms or other associations noted, let us know.    Can try sucralfate for a week to see if that will help with your abdominal symptoms.    If none of the above are making a difference, we can see if gabapentin helps with your burning abdominal pain (if it's due to nerve irritation)    Please see me in 1 month for follow up, sooner if needed.       If you had imaging scheduled please refer to your radiology prep sheet.    Appointment    Date_______________     Time_____________    Day:   M T[facility] W TH F    With____________________________    Location_________________________    If you need medication refills, please contact your pharmacy 3 days before your prescriptions runs out. If you are out of refills, your pharmacy will contact contact the clinic.    Contact us or  return if questions or concerns.     -Your Care Team:  MD Peyton Casarez PA-C Joel De Haan, PA-C Elizabeth McLean, APRN CNP    General information about your clinic      Clinic hours:     Lab hours:  Phone [phone]  Monday 7:30 am-7 pm    Monday 8:30 am-6:30 pm  Tuesday-Friday 7:30 am-5 pm   Tuesday-Friday 8:30 am-4:30 pm    Pharmacy hours:  Phone [phone]  Monday 8:30 am-7pm  Tuesday-Friday 8:30am-6 pm                                       Mychart assistance [phone]        We would like to hear from you, how was your visit today?    Angela Jacinto  Patient Information Supervisor   Patient Care Supervisor  [facility], and [facility], [facility]  [phone]         Clemente Chino MD, MD  [facility]

## 2017-12-29 ENCOUNTER — OFFICE VISIT (OUTPATIENT)
Dept: FAMILY MEDICINE | Facility: OTHER | Age: 42
End: 2017-12-29
Payer: COMMERCIAL

## 2017-12-29 ENCOUNTER — TELEPHONE (OUTPATIENT)
Dept: FAMILY MEDICINE | Facility: OTHER | Age: 42
End: 2017-12-29

## 2017-12-29 VITALS
RESPIRATION RATE: 16 BRPM | SYSTOLIC BLOOD PRESSURE: 134 MMHG | BODY MASS INDEX: 27.27 KG/M2 | DIASTOLIC BLOOD PRESSURE: 88 MMHG | HEIGHT: 65 IN | TEMPERATURE: 98.1 F | WEIGHT: 163.7 LBS | HEART RATE: 116 BPM

## 2017-12-29 DIAGNOSIS — F41.9 ANXIETY: ICD-10-CM

## 2017-12-29 DIAGNOSIS — G89.29 CHRONIC BILATERAL LOW BACK PAIN WITHOUT SCIATICA: ICD-10-CM

## 2017-12-29 DIAGNOSIS — M54.50 CHRONIC BILATERAL LOW BACK PAIN WITHOUT SCIATICA: ICD-10-CM

## 2017-12-29 DIAGNOSIS — R21 RASH AND NONSPECIFIC SKIN ERUPTION: ICD-10-CM

## 2017-12-29 DIAGNOSIS — Z90.710 S/P HYSTERECTOMY: ICD-10-CM

## 2017-12-29 DIAGNOSIS — K21.9 GASTROESOPHAGEAL REFLUX DISEASE, ESOPHAGITIS PRESENCE NOT SPECIFIED: ICD-10-CM

## 2017-12-29 DIAGNOSIS — F51.04 PSYCHOPHYSIOLOGICAL INSOMNIA: ICD-10-CM

## 2017-12-29 DIAGNOSIS — R10.11 ABDOMINAL PAIN, RIGHT UPPER QUADRANT: Primary | ICD-10-CM

## 2017-12-29 PROCEDURE — 99214 OFFICE O/P EST MOD 30 MIN: CPT | Performed by: FAMILY MEDICINE

## 2017-12-29 RX ORDER — LORAZEPAM 1 MG/1
0.5 TABLET ORAL EVERY 8 HOURS PRN
Qty: 20 TABLET | Refills: 0 | Status: SHIPPED | OUTPATIENT
Start: 2017-12-29 | End: 2018-01-26

## 2017-12-29 RX ORDER — TRIAMCINOLONE ACETONIDE 1 MG/G
CREAM TOPICAL
Qty: 80 G | Refills: 0 | Status: SHIPPED | OUTPATIENT
Start: 2017-12-29 | End: 2018-01-25

## 2017-12-29 RX ORDER — NICOTINE POLACRILEX 4 MG/1
20 GUM, CHEWING ORAL 2 TIMES DAILY
Qty: 60 TABLET | Refills: 3 | Status: SHIPPED | OUTPATIENT
Start: 2017-12-29 | End: 2019-02-14

## 2017-12-29 RX ORDER — ATORVASTATIN CALCIUM 10 MG/1
10 TABLET, FILM COATED ORAL
COMMUNITY
Start: 2017-11-06 | End: 2018-01-25

## 2017-12-29 RX ORDER — QUETIAPINE FUMARATE 200 MG/1
200 TABLET, FILM COATED ORAL AT BEDTIME
Qty: 90 TABLET | Refills: 1 | Status: SHIPPED | OUTPATIENT
Start: 2017-12-29 | End: 2018-07-11

## 2017-12-29 RX ORDER — SERTRALINE HYDROCHLORIDE 100 MG/1
200 TABLET, FILM COATED ORAL DAILY
Qty: 180 TABLET | Refills: 1 | Status: SHIPPED | OUTPATIENT
Start: 2017-12-29 | End: 2018-09-24

## 2017-12-29 RX ORDER — LIDOCAINE 50 MG/G
PATCH TOPICAL
Qty: 30 PATCH | Refills: 0 | Status: SHIPPED | OUTPATIENT
Start: 2017-12-29 | End: 2018-01-25

## 2017-12-29 ASSESSMENT — ANXIETY QUESTIONNAIRES
5. BEING SO RESTLESS THAT IT IS HARD TO SIT STILL: NOT AT ALL
3. WORRYING TOO MUCH ABOUT DIFFERENT THINGS: NEARLY EVERY DAY
4. TROUBLE RELAXING: SEVERAL DAYS
7. FEELING AFRAID AS IF SOMETHING AWFUL MIGHT HAPPEN: NEARLY EVERY DAY
GAD7 TOTAL SCORE: 14
7. FEELING AFRAID AS IF SOMETHING AWFUL MIGHT HAPPEN: NEARLY EVERY DAY
GAD7 TOTAL SCORE: 14
1. FEELING NERVOUS, ANXIOUS, OR ON EDGE: NEARLY EVERY DAY
2. NOT BEING ABLE TO STOP OR CONTROL WORRYING: MORE THAN HALF THE DAYS
6. BECOMING EASILY ANNOYED OR IRRITABLE: MORE THAN HALF THE DAYS
GAD7 TOTAL SCORE: 14

## 2017-12-29 ASSESSMENT — PAIN SCALES - GENERAL: PAINLEVEL: SEVERE PAIN (6)

## 2017-12-29 ASSESSMENT — PATIENT HEALTH QUESTIONNAIRE - PHQ9
SUM OF ALL RESPONSES TO PHQ QUESTIONS 1-9: 9
SUM OF ALL RESPONSES TO PHQ QUESTIONS 1-9: 9

## 2017-12-29 NOTE — TELEPHONE ENCOUNTER
Prior Authorization Retail Medication Request  Medication/Dose: lidocaine 5% patch  Diagnosis and ICD code: M54.5  New/Renewal/Insurance Change PA: new  Previously Tried and Failed Therapies: n/a    Insurance ID (if provided): [id number]  Insurance Phone (if provided): [phone]    Any additional info from fax request:     If you received a fax notification from an outside Pharmacy:  Pharmacy Name:[facility] [place] [place] [facility]  Pharmacy #:[phone]  Pharmacy Fax:[phone]

## 2017-12-29 NOTE — MR AVS SNAPSHOT
After Visit Summary   12/29/2017    Radha Beckwith    MRN: [id number]           Patient Information     Date Of Birth          1975        Visit Information        Provider Department      12/29/2017 3:00 PM Clemente Chino MD [facility]        Today's Diagnoses     Abdominal pain, right upper quadrant    -  1    Anxiety        Gastroesophageal reflux disease, esophagitis presence not specified        S/P hysterectomy        Psychophysiological insomnia        Chronic bilateral low back pain without sciatica        Rash and nonspecific skin eruption          Care Instructions    Thank you for visiting [facility]    Take an antihistamine (Claritin, Zyrtec, Allegra, Benadryl or similar generic - loratadine, cetirizine, fexofenadine, diphenhydramine) to help with your itching symptoms.     Get your HIDA scan done.    Let's see if an increased dose of Seroquel helps with your sleep and anxiety.    Increase sertraline to help with anxiety as well.    If change in your symptoms or other associations noted, let us know.    Can try sucralfate for a week to see if that will help with your abdominal symptoms.    If none of the above are making a difference, we can see if gabapentin helps with your burning abdominal pain (if it's due to nerve irritation)    Please see me in 1 month for follow up, sooner if needed.       If you had imaging scheduled please refer to your radiology prep sheet.    Appointment    Date_______________     Time_____________    Day:   M TU W TH F    With____________________________    Location_________________________    If you need medication refills, please contact your pharmacy 3 days before your prescriptions runs out. If you are out of refills, your pharmacy will contact contact the clinic.    Contact us or return if questions or concerns.     -Your Care Team:  MD Peyton Casarez PA-C Joel De Haan,  JEANNIE Posey CNP    General information about your clinic      Clinic hours:     Lab hours:  Phone [phone]  Monday 7:30 am-7 pm    Monday 8:30 am-6:30 pm  Tuesday-Friday 7:30 am-5 pm   Tuesday-Friday 8:30 am-4:30 pm    Pharmacy hours:  Phone [phone]  Monday 8:30 am-7pm  Tuesday-Friday 8:30am-6 pm                                       Mychart assistance [phone]        We would like to hear from you, how was your visit today?    Angela Jacinto  Patient Information Supervisor   Patient Care Supervisor  [place], [place], and [facility] Franci Thomson, and [facility]  [phone]             Follow-ups after your visit        Your next 10 appointments already scheduled     Meet 10, 2018  1:00 PM CST   (Arrive by 12:45 PM)   NM HEPATOBILIARY SCAN W GB EF with PHNM1   [facility] ([facility])    [address] [place] [address] [phone]           Please bring a list of your medicines to the exam. (Include vitamins, minerals and over-the-counter drugs.) You should wear comfortable clothes. Leave your valuables at home. Please bring related prior results and films.  Tell your doctor:   If you are breastfeeding or may be pregnant.   If you have had a barium test within the past few days. Barium may change the results of certain exams.   If you think you may need sedation (medicine to help you relax).  No food or drink (including water) for 4 hours prior to the exam.  No morphine or morphine derivatives for 6 hours prior to the exam.  Please call your Imaging Department at your exam site with any questions.              Future tests that were ordered for you today     Open Future Orders        Priority Expected Expires Ordered    NM Hepatobiliary Scan w GB EF Routine  12/29/2018 12/29/2017            Who to contact     If you have questions or need follow up information about  "today's clinic visit or your schedule please contact [facility] directly at [phone].  Normal or non-critical lab and imaging results will be communicated to you by [employer], letter or phone within 4 business days after the clinic has received the results. If you do not hear from us within 7 days, please contact the clinic through [employer] or phone. If you have a critical or abnormal lab result, we will notify you by phone as soon as possible.  Submit refill requests through [employer] or call your pharmacy and they will forward the refill request to us. Please allow 3 business days for your refill to be completed.          Additional Information About Your Visit        Xunleihart Information     [employer] lets you send messages to your doctor, view your test results, renew your prescriptions, schedule appointments and more. To sign up, go to [email or web address] . Click on \"Log in\" on the left side of the screen, which will take you to the Welcome page. Then click on \"Sign up Now\" on the right side of the page.     You will be asked to enter the access code listed below, as well as some personal information. Please follow the directions to create your username and password.     Your access code is: [email or web address]  Expires: 3/29/2018  3:51 PM     Your access code will  in 90 days. If you need help or a new code, please call your [place] clinic or [phone].        Care EveryWhere ID     This is your Care EveryWhere ID. This could be used by other organizations to access your [place] medical records  [id number]        Your Vitals Were     Pulse Temperature Respirations Height Last Period BMI (Body Mass Index)    116 98.1  F (36.7  C) (Temporal) 16 5' 5\" (1.651 m) (LMP Unknown) 27.24 kg/m2       Blood Pressure from Last 3 Encounters:   17 134/88   17 132/87   17 116/72    Weight from Last 3 Encounters:   17 163 lb 11.2 oz (74.3 kg)   17 162 lb 14.4 oz (73.9 kg) "   12/21/17 155 lb (70.3 kg)                 Today's Medication Changes          These changes are accurate as of: 12/29/17  3:57 PM.  If you have any questions, ask your nurse or doctor.               Start taking these medicines.        Dose/Directions    lidocaine 5 % Patch   Commonly known as:  LIDODERM   Used for:  Chronic bilateral low back pain without sciatica   Started by:  Clemente Chino MD        Apply up to 3 patches to painful area at once for up to 12 h within a 24 h period.  Remove after 12 hours.   Quantity:  30 patch   Refills:  0       triamcinolone 0.1 % cream   Commonly known as:  KENALOG   Used for:  Rash and nonspecific skin eruption   Started by:  Clemente Chino MD        Apply sparingly to affected area three times daily as needed   Quantity:  80 g   Refills:  0         These medicines have changed or have updated prescriptions.        Dose/Directions    QUEtiapine 200 MG tablet   Commonly known as:  SEROquel   This may have changed:    - medication strength  - how much to take  - how to take this  - when to take this  - additional instructions   Used for:  Anxiety, Psychophysiological insomnia   Changed by:  Clemente Chino MD        Dose:  200 mg   Take 1 tablet (200 mg) by mouth At Bedtime   Quantity:  90 tablet   Refills:  1       sertraline 100 MG tablet   Commonly known as:  ZOLOFT   This may have changed:  how much to take   Used for:  Anxiety   Changed by:  Clemente Chino MD        Dose:  200 mg   Take 2 tablets (200 mg) by mouth daily   Quantity:  180 tablet   Refills:  1            Where to get your medicines      These medications were sent to [facility] - [address]  [address] [address]     Phone:  [phone]     lidocaine 5 % Patch    omeprazole 20 MG tablet    QUEtiapine 200 MG tablet    sertraline 100 MG tablet    triamcinolone 0.1 % cream         Some of these will need a paper  prescription and others can be bought over the counter.  Ask your nurse if you have questions.     Bring a paper prescription for each of these medications     LORazepam 1 MG tablet                Primary Care Provider Office Phone # Fax #    [facility] [phone] [address] [facility] [address]        Equal Access to Services     DIONNE GALLARDO : Hadii aad ku hadasho Soomaali, waaxda luqadaha, qaybta kaalmada adeegyada, yadi soler haymarnien citlali viveroskendrasalvador luong. So [facility] [phone].    ATENCIÓN: Si habla español, tiene a rueda disposición servicios gratuitos de asistencia lingüística. Llame al [phone].    We comply with applicable federal civil rights laws and [place] laws. We do not discriminate on the basis of race, color, national origin, age, disability, sex, sexual orientation, or gender identity.            Thank you!     Thank you for choosing [facility]  for your care. Our goal is always to provide you with excellent care. Hearing back from our patients is one way we can continue to improve our services. Please take a few minutes to complete the written survey that you may receive in the mail after your visit with us. Thank you!             Your Updated Medication List - Protect others around you: Learn how to safely use, store and throw away your medicines at www.disposemymeds.org.          This list is accurate as of: 12/29/17  3:57 PM.  Always use your most recent med list.                   Brand Name Dispense Instructions for use Diagnosis    atorvastatin 10 MG tablet    LIPITOR     Take 10 mg by mouth        cetirizine 10 MG tablet    zyrTEC    30 tablet    Take 1 tablet (10 mg) by mouth every evening    Seasonal allergic rhinitis, unspecified chronicity, unspecified trigger       lidocaine 5 % Patch    LIDODERM    30 patch    Apply up to 3 patches to painful area at once for up to 12 h within a 24 h period.  Remove after 12 hours.     Chronic bilateral low back pain without sciatica       LORazepam 1 MG tablet    ATIVAN    20 tablet    Take 0.5 tablets (0.5 mg) by mouth every 8 hours as needed for anxiety    Anxiety       omeprazole 20 MG tablet     60 tablet    Take 1 tablet (20 mg) by mouth 2 times daily Take 30-60 minutes before a meal.    Gastroesophageal reflux disease, esophagitis presence not specified       ondansetron 4 MG tablet    ZOFRAN    18 tablet    TAKE ONE TABLET BY MOUTH EVERY 6 HOURS AS NEEDED FOR NAUSEA OR VOMITING    Anxiety attack       order for DME     1 each    Blood pressure cuff    Elevated blood pressure reading without diagnosis of hypertension       propranolol 20 MG tablet    INDERAL    180 tablet    Take 1 tablet (20 mg) by mouth 2 times daily    Sinus tachycardia, Anxiety       QUEtiapine 200 MG tablet    SEROquel    90 tablet    Take 1 tablet (200 mg) by mouth At Bedtime    Anxiety, Psychophysiological insomnia       senna-docusate 8.6-50 MG per tablet    SENOKOT-S;PERICOLACE    30 tablet    Take 1-2 tablets by mouth daily as needed for constipation    S/P hysterectomy       sertraline 100 MG tablet    ZOLOFT    180 tablet    Take 2 tablets (200 mg) by mouth daily    Anxiety       sucralfate 1 GM tablet    CARAFATE    120 tablet    TAKE ONE TABLET BY MOUTH FOUR TIMES A DAY    Gastroesophageal reflux disease, esophagitis presence not specified       triamcinolone 0.1 % cream    KENALOG    80 g    Apply sparingly to affected area three times daily as needed    Rash and nonspecific skin eruption       VITAMIN D (CHOLECALCIFEROL) PO      Take by mouth daily

## 2017-12-29 NOTE — PATIENT INSTRUCTIONS
Thank you for visiting [facility]    Take an antihistamine (Claritin, Zyrtec, Allegra, Benadryl or similar generic - loratadine, cetirizine, fexofenadine, diphenhydramine) to help with your itching symptoms.     Get your HIDA scan done.    Let's see if an increased dose of Seroquel helps with your sleep and anxiety.    Increase sertraline to help with anxiety as well.    If change in your symptoms or other associations noted, let us know.    Can try sucralfate for a week to see if that will help with your abdominal symptoms.    If none of the above are making a difference, we can see if gabapentin helps with your burning abdominal pain (if it's due to nerve irritation)    Please see me in 1 month for follow up, sooner if needed.       If you had imaging scheduled please refer to your radiology prep sheet.    Appointment    Date_______________     Time_____________    Day:   M TU W TH F    With____________________________    Location_________________________    If you need medication refills, please contact your pharmacy 3 days before your prescriptions runs out. If you are out of refills, your pharmacy will contact contact the clinic.    Contact us or return if questions or concerns.     -Your Care Team:  MD Peyton Casarez PA-C Joel De Haan, PA-C Elizabeth McLean, APRN CNP    General information about your clinic      Clinic hours:     Lab hours:  Phone [phone]  Monday 7:30 am-7 pm    Monday 8:30 am-6:30 pm  Tuesday-Friday 7:30 am-5 pm   Tuesday-Friday 8:30 am-4:30 pm    Pharmacy hours:  Phone [phone]  Monday 8:30 am-7pm  Tuesday-Friday 8:30am-6 pm                                       Mychart assistance [phone]        We would like to hear from you, how was your visit today?    Angela Jacinto  Patient Information Supervisor   Patient Care Supervisor  [facility], and [facility] and [facility]  [facility]  [phone]

## 2017-12-29 NOTE — NURSING NOTE
"Chief Complaint   Patient presents with     Anxiety     Panel Management     UTD       Initial BP (!) 126/92 (BP Location: Right arm, Patient Position: Chair, Cuff Size: Adult Regular)  Pulse 116  Temp 98.1  F (36.7  C) (Temporal)  Resp 16  Ht 5' 5\" (1.651 m)  Wt 163 lb 11.2 oz (74.3 kg)  LMP  (LMP Unknown)  BMI 27.24 kg/m2 Estimated body mass index is 27.24 kg/(m^2) as calculated from the following:    Height as of this encounter: 5' 5\" (1.651 m).    Weight as of this encounter: 163 lb 11.2 oz (74.3 kg).  Medication Reconciliation: complete  Zenon Steiner, LYSSA    "

## 2017-12-30 PROBLEM — Z90.710 S/P HYSTERECTOMY: Status: ACTIVE | Noted: 2017-12-30

## 2017-12-30 PROBLEM — F51.04 PSYCHOPHYSIOLOGICAL INSOMNIA: Status: ACTIVE | Noted: 2017-12-30

## 2017-12-30 PROBLEM — M54.50 CHRONIC BILATERAL LOW BACK PAIN WITHOUT SCIATICA: Status: ACTIVE | Noted: 2017-12-30

## 2017-12-30 PROBLEM — G89.29 CHRONIC BILATERAL LOW BACK PAIN WITHOUT SCIATICA: Status: ACTIVE | Noted: 2017-12-30

## 2017-12-30 ASSESSMENT — PATIENT HEALTH QUESTIONNAIRE - PHQ9: SUM OF ALL RESPONSES TO PHQ QUESTIONS 1-9: 9

## 2017-12-30 ASSESSMENT — ANXIETY QUESTIONNAIRES: GAD7 TOTAL SCORE: 14

## 2018-01-02 NOTE — TELEPHONE ENCOUNTER
Central Prior Authorization Team   Phone: [phone]    PRIOR AUTHORIZATION DENIED    Medication: lidocaine 5% patch - Denied    Denial Date: 1/2/2018    Denial Rational:             Appeal Information:

## 2018-01-02 NOTE — TELEPHONE ENCOUNTER
Central Prior Authorization Team   Phone: [phone]    PA Initiation    Medication: lidocaine 5% patch  Insurance Company: [employer] - Phone [phone] Fax [phone]  Pharmacy Filling the Rx: [facility] - [address]  Filling Pharmacy Phone: [phone]  Filling Pharmacy Fax: [phone]  Start Date: 1/2/2018

## 2018-01-15 DIAGNOSIS — J30.2 SEASONAL ALLERGIC RHINITIS, UNSPECIFIED CHRONICITY, UNSPECIFIED TRIGGER: ICD-10-CM

## 2018-01-17 RX ORDER — CETIRIZINE HYDROCHLORIDE 10 MG/1
TABLET ORAL
Qty: 30 TABLET | Refills: 3 | Status: SHIPPED | OUTPATIENT
Start: 2018-01-17 | End: 2018-07-11

## 2018-01-24 ENCOUNTER — HOSPITAL ENCOUNTER (OUTPATIENT)
Dept: NUCLEAR MEDICINE | Facility: CLINIC | Age: 43
Setting detail: NUCLEAR MEDICINE
Discharge: HOME | End: 2018-01-24
Attending: FAMILY MEDICINE | Admitting: FAMILY MEDICINE
Payer: COMMERCIAL

## 2018-01-24 ENCOUNTER — OFFICE VISIT (OUTPATIENT)
Dept: FAMILY MEDICINE | Facility: CLINIC | Age: 43
End: 2018-01-24
Payer: COMMERCIAL

## 2018-01-24 VITALS
WEIGHT: 161.5 LBS | BODY MASS INDEX: 26.91 KG/M2 | TEMPERATURE: 97.1 F | DIASTOLIC BLOOD PRESSURE: 68 MMHG | SYSTOLIC BLOOD PRESSURE: 124 MMHG | OXYGEN SATURATION: 98 % | HEART RATE: 88 BPM | HEIGHT: 65 IN

## 2018-01-24 DIAGNOSIS — R10.11 RUQ ABDOMINAL PAIN: ICD-10-CM

## 2018-01-24 DIAGNOSIS — Z90.710 S/P HYSTERECTOMY: ICD-10-CM

## 2018-01-24 DIAGNOSIS — R10.11 ABDOMINAL PAIN, RIGHT UPPER QUADRANT: ICD-10-CM

## 2018-01-24 DIAGNOSIS — K80.50 BILIARY COLIC: Primary | ICD-10-CM

## 2018-01-24 LAB
ALBUMIN SERPL-MCNC: 3.5 G/DL (ref 3.4–5)
ALP SERPL-CCNC: 71 U/L (ref 40–150)
ALT SERPL W P-5'-P-CCNC: 62 U/L (ref 0–50)
AMYLASE SERPL-CCNC: 37 U/L (ref 30–110)
ANION GAP SERPL CALCULATED.3IONS-SCNC: 6 MMOL/L (ref 3–14)
AST SERPL W P-5'-P-CCNC: 41 U/L (ref 0–45)
BILIRUB SERPL-MCNC: 0.3 MG/DL (ref 0.2–1.3)
BUN SERPL-MCNC: 13 MG/DL (ref 7–30)
CALCIUM SERPL-MCNC: 8.6 MG/DL (ref 8.5–10.1)
CHLORIDE SERPL-SCNC: 105 MMOL/L (ref 94–109)
CO2 SERPL-SCNC: 27 MMOL/L (ref 20–32)
CREAT SERPL-MCNC: 0.7 MG/DL (ref 0.52–1.04)
ERYTHROCYTE [DISTWIDTH] IN BLOOD BY AUTOMATED COUNT: 12.9 % (ref 10–15)
GFR SERPL CREATININE-BSD FRML MDRD: >90 ML/MIN/1.7M2
GLUCOSE SERPL-MCNC: 105 MG/DL (ref 70–99)
HCT VFR BLD AUTO: 40.1 % (ref 35–47)
HGB BLD-MCNC: 12.9 G/DL (ref 11.7–15.7)
LIPASE SERPL-CCNC: 95 U/L (ref 73–393)
MCH RBC QN AUTO: 30.9 PG (ref 26.5–33)
MCHC RBC AUTO-ENTMCNC: 32.2 G/DL (ref 31.5–36.5)
MCV RBC AUTO: 96 FL (ref 78–100)
PLATELET # BLD AUTO: 184 10E9/L (ref 150–450)
POTASSIUM SERPL-SCNC: 3.8 MMOL/L (ref 3.4–5.3)
PROT SERPL-MCNC: 7.1 G/DL (ref 6.8–8.8)
RBC # BLD AUTO: 4.17 10E12/L (ref 3.8–5.2)
SODIUM SERPL-SCNC: 138 MMOL/L (ref 133–144)
WBC # BLD AUTO: 7.2 10E9/L (ref 4–11)

## 2018-01-24 PROCEDURE — 34300033 ZZH RX 343: Performed by: FAMILY MEDICINE

## 2018-01-24 PROCEDURE — 99214 OFFICE O/P EST MOD 30 MIN: CPT | Performed by: FAMILY MEDICINE

## 2018-01-24 PROCEDURE — 80053 COMPREHEN METABOLIC PANEL: CPT | Performed by: FAMILY MEDICINE

## 2018-01-24 PROCEDURE — 25000128 H RX IP 250 OP 636: Performed by: FAMILY MEDICINE

## 2018-01-24 PROCEDURE — 85027 COMPLETE CBC AUTOMATED: CPT | Performed by: FAMILY MEDICINE

## 2018-01-24 PROCEDURE — 36415 COLL VENOUS BLD VENIPUNCTURE: CPT | Performed by: FAMILY MEDICINE

## 2018-01-24 PROCEDURE — 83690 ASSAY OF LIPASE: CPT | Performed by: FAMILY MEDICINE

## 2018-01-24 PROCEDURE — A9537 TC99M MEBROFENIN: HCPCS | Performed by: FAMILY MEDICINE

## 2018-01-24 PROCEDURE — 78227 HEPATOBIL SYST IMAGE W/DRUG: CPT

## 2018-01-24 PROCEDURE — 82150 ASSAY OF AMYLASE: CPT | Performed by: FAMILY MEDICINE

## 2018-01-24 RX ORDER — MORPHINE SULFATE 10 MG/ML
1 INJECTION, SOLUTION INTRAMUSCULAR; INTRAVENOUS
Qty: 1 ML | Refills: 0 | Status: SHIPPED | OUTPATIENT
Start: 2018-01-24 | End: 2018-01-25

## 2018-01-24 RX ORDER — KIT FOR THE PREPARATION OF TECHNETIUM TC 99M MEBROFENIN 45 MG/10ML
5.3 INJECTION, POWDER, LYOPHILIZED, FOR SOLUTION INTRAVENOUS ONCE
Status: COMPLETED | OUTPATIENT
Start: 2018-01-24 | End: 2018-01-24

## 2018-01-24 RX ADMIN — SODIUM CHLORIDE 1.5 MCG: 9 INJECTION, SOLUTION INTRAVENOUS at 12:18

## 2018-01-24 RX ADMIN — MEBROFENIN 5.3 MILLICURIE: 45 INJECTION, POWDER, LYOPHILIZED, FOR SOLUTION INTRAVENOUS at 11:14

## 2018-01-24 ASSESSMENT — PAIN SCALES - GENERAL: PAINLEVEL: EXTREME PAIN (8)

## 2018-01-24 NOTE — NURSING NOTE
Prior to injection at 2:17 PM, verified patient identity using patient's name and date of birth.  The following medication was given:     MEDICATION: Morphine Sulfate 10 mg   ROUTE: IM  SITE: Arm - Left  DOSE: 10 mg ( 1 ml)   LOT #: [id number]  :  Hospira  EXPIRATION DATE:  04/01/2019  NDC#: 6116-7362-98    CARRIE Reyes /CONG Ya

## 2018-01-24 NOTE — MR AVS SNAPSHOT
After Visit Summary   1/24/2018    Radha Beckwith    MRN: [id number]           Patient Information     Date Of Birth          1975        Visit Information        Provider Department      1/24/2018 1:15 PM Luther Reyes MD [facility]        Today's Diagnoses     Biliary colic    -  1    RUQ abdominal pain           Follow-ups after your visit        Additional Services     GENERAL SURG ADULT REFERRAL       Your provider has referred you to: FM[facility]: [facility] [phone]   [email or web address]    Please be aware that coverage of these services is subject to the terms and limitations of your health insurance plan.  Call member services at your health plan with any benefit or coverage questions.      Please bring the following to your appointment:  >>   Any x-rays, CTs or MRIs which have been performed.  Contact the facility where they were done to arrange for  prior to your scheduled appointment.  Any new CT, MRI or other procedures ordered by your specialist must be performed at a [place] facility or coordinated by your clinic's referral office.    >>   List of current medications   >>   This referral request   >>   Any documents/labs given to you for this referral                  Your next 10 appointments already scheduled     Jan 25, 2018  2:15 PM CST   New Visit with Tigre Lowry,    [facility] ([facility])    [facility] [address] [facility] [address] [phone]              Who to contact     If you have questions or need follow up information about today's clinic visit or your schedule please contact [facility] directly at [phone].  Normal or non-critical lab and imaging results will be communicated to you by MyChart, letter or phone within 4 business days after the clinic has received the results. If you do not hear from us within 7 days, please  "contact the clinic through [employer] or phone. If you have a critical or abnormal lab result, we will notify you by phone as soon as possible.  Submit refill requests through [employer] or call your pharmacy and they will forward the refill request to us. Please allow 3 business days for your refill to be completed.          Additional Information About Your Visit        [employer] Information     [employer] lets you send messages to your doctor, view your test results, renew your prescriptions, schedule appointments and more. To sign up, go to [email or web address] . Click on \"Log in\" on the left side of the screen, which will take you to the Welcome page. Then click on \"Sign up Now\" on the right side of the page.     You will be asked to enter the access code listed below, as well as some personal information. Please follow the directions to create your username and password.     Your access code is: [email or web address]  Expires: 3/29/2018  3:51 PM     Your access code will  in 90 days. If you need help or a new code, please call your [place] clinic or [phone].        Care EveryWhere ID     This is your Care EveryWhere ID. This could be used by other organizations to access your [place] medical records  [id number]        Your Vitals Were     Pulse Temperature Height Last Period Pulse Oximetry BMI (Body Mass Index)    88 97.1  F (36.2  C) (Temporal) 5' 5\" (1.651 m) (LMP Unknown) 98% 26.88 kg/m2       Blood Pressure from Last 3 Encounters:   18 124/68   17 134/88   17 132/87    Weight from Last 3 Encounters:   18 161 lb 8 oz (73.3 kg)   17 163 lb 11.2 oz (74.3 kg)   17 162 lb 14.4 oz (73.9 kg)              We Performed the Following     Amylase     CBC with platelets     Comprehensive metabolic panel     GENERAL SURG ADULT REFERRAL     Lipase          Today's Medication Changes          These changes are accurate as of 18  3:40 PM.  If you have any questions, ask your nurse or " doctor.               Start taking these medicines.        Dose/Directions    Morphine Sulfate (PF) 10 MG/ML injection   Used for:  Biliary colic   Started by:  Luther Reyes MD        Dose:  1 mL   Inject 10 mg into the vein every hour as needed for moderate to severe pain   Quantity:  1 mL   Refills:  0            Where to get your medicines      Some of these will need a paper prescription and others can be bought over the counter.  Ask your nurse if you have questions.     Bring a paper prescription for each of these medications     Morphine Sulfate (PF) 10 MG/ML injection                Primary Care Provider Office Phone # Fax #    [facility] [phone] [address] [facility] [address]        Equal Access to Services     DIONNE GALLARDO : Calderon Holt, wabrandon arce, qaybta kaalmada porfirio, yadi luong. So [facility] [phone].    ATENCIÓN: Si habla español, tiene a rueda disposición servicios gratuitos de asistencia lingüística. Llame al [phone].    We comply with applicable federal civil rights laws and [place] laws. We do not discriminate on the basis of race, color, national origin, age, disability, sex, sexual orientation, or gender identity.            Thank you!     Thank you for choosing [facility]  for your care. Our goal is always to provide you with excellent care. Hearing back from our patients is one way we can continue to improve our services. Please take a few minutes to complete the written survey that you may receive in the mail after your visit with us. Thank you!             Your Updated Medication List - Protect others around you: Learn how to safely use, store and throw away your medicines at www.disposemymeds.org.          This list is accurate as of 1/24/18  3:40 PM.  Always use your most recent med list.                   Brand Name Dispense Instructions for use Diagnosis     atorvastatin 10 MG tablet    LIPITOR     Take 10 mg by mouth        cetirizine 10 MG tablet    zyrTEC    30 tablet    TAKE ONE TABLET BY MOUTH EVERY EVENING    Seasonal allergic rhinitis, unspecified chronicity, unspecified trigger       lidocaine 5 % Patch    LIDODERM    30 patch    Apply up to 3 patches to painful area at once for up to 12 h within a 24 h period.  Remove after 12 hours.    Chronic bilateral low back pain without sciatica       LORazepam 1 MG tablet    ATIVAN    20 tablet    Take 0.5 tablets (0.5 mg) by mouth every 8 hours as needed for anxiety    Anxiety       Morphine Sulfate (PF) 10 MG/ML injection     1 mL    Inject 10 mg into the vein every hour as needed for moderate to severe pain    Biliary colic       omeprazole 20 MG tablet     60 tablet    Take 1 tablet (20 mg) by mouth 2 times daily Take 30-60 minutes before a meal.    Gastroesophageal reflux disease, esophagitis presence not specified       ondansetron 4 MG tablet    ZOFRAN    18 tablet    TAKE ONE TABLET BY MOUTH EVERY 6 HOURS AS NEEDED FOR NAUSEA OR VOMITING    Anxiety attack       order for DME     1 each    Blood pressure cuff    Elevated blood pressure reading without diagnosis of hypertension       propranolol 20 MG tablet    INDERAL    180 tablet    Take 1 tablet (20 mg) by mouth 2 times daily    Sinus tachycardia, Anxiety       QUEtiapine 200 MG tablet    SEROquel    90 tablet    Take 1 tablet (200 mg) by mouth At Bedtime    Anxiety, Psychophysiological insomnia       senna-docusate 8.6-50 MG per tablet    SENOKOT-S;PERICOLACE    30 tablet    Take 1-2 tablets by mouth daily as needed for constipation    S/P hysterectomy       sertraline 100 MG tablet    ZOLOFT    180 tablet    Take 2 tablets (200 mg) by mouth daily    Anxiety       sucralfate 1 GM tablet    CARAFATE    120 tablet    TAKE ONE TABLET BY MOUTH FOUR TIMES A DAY    Gastroesophageal reflux disease, esophagitis presence not specified       triamcinolone 0.1 % cream     KENALOG    80 g    Apply sparingly to affected area three times daily as needed    Rash and nonspecific skin eruption       VITAMIN D (CHOLECALCIFEROL) PO      Take by mouth daily

## 2018-01-24 NOTE — NURSING NOTE
"Chief Complaint   Patient presents with     Abdominal Pain       Initial /68 (Cuff Size: Adult Regular)  Pulse 88  Temp 97.1  F (36.2  C) (Temporal)  Ht 5' 5\" (1.651 m)  Wt 161 lb 8 oz (73.3 kg)  LMP  (LMP Unknown)  SpO2 98%  BMI 26.88 kg/m2 Estimated body mass index is 26.88 kg/(m^2) as calculated from the following:    Height as of this encounter: 5' 5\" (1.651 m).    Weight as of this encounter: 161 lb 8 oz (73.3 kg).  Medication Reconciliation: complete   Anita Bhakta CMA (AAMA)      "

## 2018-01-24 NOTE — PROGRESS NOTES
SUBJECTIVE:   Radha Beckwith is a 42 year old female who presents to clinic today for the following health issues:      ABDOMINAL PAIN     Onset: Friday night    Description:   Character: Sharp  Location: right upper quadrant  Radiation: Back    Intensity: severe    Progression of Symptoms:  worsening    Accompanying Signs & Symptoms:  Fever/Chills?: no   Gas/Bloating: YES  Nausea: YES  Vomitting: no   Diarrhea?: YES  Constipation:YES  Dysuria or Hematuria: no    History:   Trauma: no   Previous similar pain: YES   Previous tests done: hida scan, ultrasound, cat scan    Precipitating factors:   Does the pain change with:     Food: YES- worse     BM: no     Urination: no     Therapies Tried and outcome: tylenol    LMP:  not applicable       Patient has had 1 or 2 visits for right upper quadrant abdominal pain with CT scan and ultrasound.  There was some mild liver findings on these exams and perhaps blood work.  For the last 4 days she has been having right upper quadrant pain that is been a nuisance.  Today she had her HIDA scan and pain increased drastically.  She was so uncomfortable she did not want to go home and she hoped to visit the clinic and see if we can manage the pain.  She is slightly nauseated from it.  It is in the right lower quadrant and of the right back.  Bowel movements as above..  She is not having fever or chills.    Problem list and histories reviewed & adjusted, as indicated.  Additional history: as documented    BP Readings from Last 3 Encounters:   01/24/18 124/68   12/29/17 134/88   12/22/17 132/87    Wt Readings from Last 3 Encounters:   01/24/18 161 lb 8 oz (73.3 kg)   12/29/17 163 lb 11.2 oz (74.3 kg)   12/22/17 162 lb 14.4 oz (73.9 kg)                  Labs reviewed in EPIC    Reviewed and updated as needed this visit by clinical staff  Tobacco  Allergies  Meds  Med Hx  Surg Hx  Fam Hx  Soc Hx      Reviewed and updated as needed this visit by Provider      "    ROS:  Constitutional, HEENT, cardiovascular, pulmonary, gi and gu systems are negative, except as otherwise noted.    OBJECTIVE:     /68 (Cuff Size: Adult Regular)  Pulse 88  Temp 97.1  F (36.2  C) (Temporal)  Ht 5' 5\" (1.651 m)  Wt 161 lb 8 oz (73.3 kg)  LMP  (LMP Unknown)  SpO2 98%  BMI 26.88 kg/m2  Body mass index is 26.88 kg/(m^2).  GENERAL: healthy, alert, moderate physical distress and fatigued  HENT: Moist mucous membranes  NECK: no adenopathy, no asymmetry, masses, or scars and thyroid normal to palpation  RESP: lungs clear to auscultation - no rales, rhonchi or wheezes  CV: regular rate and rhythm, normal S1 S2, no S3 or S4, no murmur, click or rub, no peripheral edema and peripheral pulses strong  ABDOMEN: Tender right upper quadrant and 2 CVA tenderness especially right side.  There are bowel sounds present.  There is no guarding and no rebound.    Diagnostic Test Results:  Xray -HIDA scan is reported as negative but she was noted to have extreme increase in pain at the start of the scan.  Laboratory testing with comprehensive profile, lipase, amylase, CBC is unremarkable    ASSESSMENT/PLAN:               ICD-10-CM    1. Biliary colic K80.50 Morphine Sulfate, PF, 10 MG/ML injection     Lipase     CBC with platelets     Comprehensive metabolic panel     Amylase   2. RUQ abdominal pain R10.11 GENERAL SURG ADULT REFERRAL       We attempted 10 mg of IM morphine with little or no change in comfort.  We discussed the possibility of going to the emergency room for more pain control.  She and person with her understand that she can go home but may need to return to the emergency room if things get bad.  At this point there does not seem to be any emergent surgical problem.  We have arranged for surgical consultation tomorrow because based on history and appearance, gallbladder may still be an issue.  Colonoscopy might be the other thing that would be necessary and they could also arrange this.  " Currently patient is comfortable with this plan and will follow-up tomorrow with surgeon.  She will go to the emergency room if things change drastically.    Luther Tigre Reyes MD  [facility]

## 2018-01-25 ENCOUNTER — HOSPITAL ENCOUNTER (EMERGENCY)
Facility: CLINIC | Age: 43
Discharge: HOME | End: 2018-01-25
Attending: EMERGENCY MEDICINE | Admitting: EMERGENCY MEDICINE
Payer: COMMERCIAL

## 2018-01-25 VITALS
BODY MASS INDEX: 26.79 KG/M2 | DIASTOLIC BLOOD PRESSURE: 70 MMHG | WEIGHT: 161 LBS | SYSTOLIC BLOOD PRESSURE: 120 MMHG | RESPIRATION RATE: 16 BRPM | OXYGEN SATURATION: 95 % | TEMPERATURE: 98.8 F | HEART RATE: 64 BPM

## 2018-01-25 DIAGNOSIS — R10.11 RUQ ABDOMINAL PAIN: ICD-10-CM

## 2018-01-25 LAB
BASOPHILS # BLD AUTO: 0 10E9/L (ref 0–0.2)
BASOPHILS NFR BLD AUTO: 0.2 %
DIFFERENTIAL METHOD BLD: NORMAL
EOSINOPHIL # BLD AUTO: 0.1 10E9/L (ref 0–0.7)
EOSINOPHIL NFR BLD AUTO: 1.3 %
ERYTHROCYTE [DISTWIDTH] IN BLOOD BY AUTOMATED COUNT: 12.8 % (ref 10–15)
HCT VFR BLD AUTO: 41 % (ref 35–47)
HGB BLD-MCNC: 13.5 G/DL (ref 11.7–15.7)
IMM GRANULOCYTES # BLD: 0 10E9/L (ref 0–0.4)
IMM GRANULOCYTES NFR BLD: 0.2 %
LIPASE SERPL-CCNC: 307 U/L (ref 73–393)
LYMPHOCYTES # BLD AUTO: 1.4 10E9/L (ref 0.8–5.3)
LYMPHOCYTES NFR BLD AUTO: 13.2 %
MCH RBC QN AUTO: 31.5 PG (ref 26.5–33)
MCHC RBC AUTO-ENTMCNC: 32.9 G/DL (ref 31.5–36.5)
MCV RBC AUTO: 96 FL (ref 78–100)
MONOCYTES # BLD AUTO: 0.5 10E9/L (ref 0–1.3)
MONOCYTES NFR BLD AUTO: 5.1 %
NEUTROPHILS # BLD AUTO: 8.3 10E9/L (ref 1.6–8.3)
NEUTROPHILS NFR BLD AUTO: 80 %
PLATELET # BLD AUTO: 190 10E9/L (ref 150–450)
RBC # BLD AUTO: 4.28 10E12/L (ref 3.8–5.2)
WBC # BLD AUTO: 10.4 10E9/L (ref 4–11)

## 2018-01-25 PROCEDURE — 99285 EMERGENCY DEPT VISIT HI MDM: CPT | Performed by: FAMILY MEDICINE

## 2018-01-25 PROCEDURE — 85025 COMPLETE CBC W/AUTO DIFF WBC: CPT | Performed by: FAMILY MEDICINE

## 2018-01-25 PROCEDURE — 25000132 ZZH RX MED GY IP 250 OP 250 PS 637: Performed by: FAMILY MEDICINE

## 2018-01-25 PROCEDURE — 99285 EMERGENCY DEPT VISIT HI MDM: CPT | Mod: Z6 | Performed by: FAMILY MEDICINE

## 2018-01-25 PROCEDURE — 96375 TX/PRO/DX INJ NEW DRUG ADDON: CPT | Performed by: FAMILY MEDICINE

## 2018-01-25 PROCEDURE — 96374 THER/PROPH/DIAG INJ IV PUSH: CPT | Performed by: FAMILY MEDICINE

## 2018-01-25 PROCEDURE — 83690 ASSAY OF LIPASE: CPT | Performed by: FAMILY MEDICINE

## 2018-01-25 PROCEDURE — 96376 TX/PRO/DX INJ SAME DRUG ADON: CPT | Performed by: FAMILY MEDICINE

## 2018-01-25 PROCEDURE — 25000128 H RX IP 250 OP 636: Performed by: FAMILY MEDICINE

## 2018-01-25 RX ORDER — LIDOCAINE 4 G/G
1 PATCH TOPICAL ONCE
Status: DISCONTINUED | OUTPATIENT
Start: 2018-01-25 | End: 2018-01-25 | Stop reason: HOSPADM

## 2018-01-25 RX ORDER — KETOROLAC TROMETHAMINE 30 MG/ML
30 INJECTION, SOLUTION INTRAMUSCULAR; INTRAVENOUS ONCE
Status: COMPLETED | OUTPATIENT
Start: 2018-01-25 | End: 2018-01-25

## 2018-01-25 RX ORDER — LIDOCAINE 50 MG/G
PATCH TOPICAL
Qty: 30 PATCH | Refills: 0 | Status: SHIPPED | OUTPATIENT
Start: 2018-01-25 | End: 2018-11-20

## 2018-01-25 RX ORDER — ONDANSETRON 2 MG/ML
4 INJECTION INTRAMUSCULAR; INTRAVENOUS
Status: COMPLETED | OUTPATIENT
Start: 2018-01-25 | End: 2018-01-25

## 2018-01-25 RX ORDER — METOCLOPRAMIDE HYDROCHLORIDE 5 MG/ML
5 INJECTION INTRAMUSCULAR; INTRAVENOUS ONCE
Status: DISCONTINUED | OUTPATIENT
Start: 2018-01-25 | End: 2018-01-25 | Stop reason: HOSPADM

## 2018-01-25 RX ORDER — GABAPENTIN 300 MG/1
300 CAPSULE ORAL 3 TIMES DAILY
Qty: 30 CAPSULE | Refills: 1 | Status: SHIPPED | OUTPATIENT
Start: 2018-01-25 | End: 2018-11-20

## 2018-01-25 RX ORDER — HYDROMORPHONE HYDROCHLORIDE 1 MG/ML
0.5 INJECTION, SOLUTION INTRAMUSCULAR; INTRAVENOUS; SUBCUTANEOUS
Status: COMPLETED | OUTPATIENT
Start: 2018-01-25 | End: 2018-01-25

## 2018-01-25 RX ORDER — HYDROCODONE BITARTRATE AND ACETAMINOPHEN 5; 325 MG/1; MG/1
1-2 TABLET ORAL EVERY 6 HOURS PRN
Qty: 6 TABLET | Refills: 0 | Status: SHIPPED | OUTPATIENT
Start: 2018-01-25 | End: 2018-02-07

## 2018-01-25 RX ORDER — HYDROMORPHONE HYDROCHLORIDE 1 MG/ML
0.5 INJECTION, SOLUTION INTRAMUSCULAR; INTRAVENOUS; SUBCUTANEOUS ONCE
Status: COMPLETED | OUTPATIENT
Start: 2018-01-25 | End: 2018-01-25

## 2018-01-25 RX ADMIN — HYDROMORPHONE HYDROCHLORIDE 0.5 MG: 1 INJECTION, SOLUTION INTRAMUSCULAR; INTRAVENOUS; SUBCUTANEOUS at 16:40

## 2018-01-25 RX ADMIN — LIDOCAINE 1 PATCH: 560 PATCH PERCUTANEOUS; TOPICAL; TRANSDERMAL at 16:47

## 2018-01-25 RX ADMIN — HYDROMORPHONE HYDROCHLORIDE 0.5 MG: 1 INJECTION, SOLUTION INTRAMUSCULAR; INTRAVENOUS; SUBCUTANEOUS at 14:55

## 2018-01-25 RX ADMIN — HYDROMORPHONE HYDROCHLORIDE 0.5 MG: 1 INJECTION, SOLUTION INTRAMUSCULAR; INTRAVENOUS; SUBCUTANEOUS at 15:32

## 2018-01-25 RX ADMIN — ONDANSETRON 4 MG: 2 INJECTION INTRAMUSCULAR; INTRAVENOUS at 15:04

## 2018-01-25 RX ADMIN — HYDROMORPHONE HYDROCHLORIDE 0.5 MG: 1 INJECTION, SOLUTION INTRAMUSCULAR; INTRAVENOUS; SUBCUTANEOUS at 16:04

## 2018-01-25 RX ADMIN — KETOROLAC TROMETHAMINE 30 MG: 30 INJECTION, SOLUTION INTRAMUSCULAR at 14:52

## 2018-01-25 ASSESSMENT — ENCOUNTER SYMPTOMS
CONSTIPATION: 0
VOMITING: 0
APPETITE CHANGE: 1
NAUSEA: 1
FEVER: 0
ANAL BLEEDING: 0
BLOOD IN STOOL: 0
DIARRHEA: 0
ABDOMINAL PAIN: 1

## 2018-01-25 NOTE — DISCHARGE INSTRUCTIONS
*Abdominal Pain, Unknown Cause (Female)    The exact cause of your abdominal (stomach) pain is not certain. This does not mean that this is something to worry about, or the right tests were not done. Everyone likes to know the exact cause of the problem, but sometimes with abdominal pain, there is no clear-cut cause, and this could be a good thing. The good news is that your symptoms can be treated, and you will feel better.   Your condition does not seem serious now; however, sometimes the signs of a serious problem may take more time to appear. For this reason, it is important for you to watch for any new symptoms, problems, or worsening of your condition.  Over the next few days, the abdominal pain may come and go, or be continuous. Other common symptoms can include nausea and vomiting. Sometimes it can be difficult to tell if you feel nauseous, you may just feel bad and not associate that feeling with nausea. Constipation, diarrhea, and a fever may go along with the pain.  The pain may continue even if treated correctly over the following days. Depending on how things go, sometimes the cause can become clear and may require further or different treatment. Additional evaluations, medications, or tests may be needed.  Home care  Your health care provider may prescribe medications for pain, symptoms, or an infection.  Follow the health care provider's instructions for taking these medications.  General care    Rest until your next exam. No strenuous activities.    Try to find positions that ease discomfort. A small pillow placed on the abdomen may help relieve pain.    Something warm on your abdomen (such as a heating pad) may help, but be careful not to burn yourself.  Diet    Do not force yourself to eat, especially if having cramps, vomiting, or diarrhea.    Water is important so you do not get dehydrated. Soup may also be good. Sports drinks may also help, especially if they are not too acidic. Make sure you  don't drink sugary drinks as this can make things worse. Take liquids in small amounts. Do not guzzle them.    Caffeine sometimes makes the pain and cramping worse.    Avoid dairy products if you have vomiting or diarrhea.    Don't eat large amounts at a time. Wait a few minutes between bites.    Eat a diet low in fiber (called a low-residue diet). Foods allowed include refined breads, white rice, fruit and vegetable juices without pulp, tender meats. These foods will pass more easily through the intestine.    Avoid fried or fatty foods, dairy, alcohol and spicy foods until your symptoms go away.  Follow-up care  Follow up with your health care provider as instructed, or if your pain does not begin to improve in the next 24 hours.  When to seek medical care  Seek prompt medical care if any of the following occur:    Pain gets worse or moves to the right lower abdomen    New or worsening vomiting or diarrhea    Swelling of the abdomen    Unable to pass stool for more than three days    New fever over 101  F (38.3 C), or rising fever    Blood in vomit or bowel movements (dark red or black color)    Jaundice (yellow color of eyes and skin)    Weakness, dizziness    Chest, arm, back, neck or jaw pain    Unexpected vaginal bleeding or missed period  Call 911  Call emergency services if any of the following occur:    Trouble breathing    Confusion    Fainting or loss of consciousness    Rapid heart rate    Seizure    2032-5426 [facility], [address] [place] [address], [place] [address]. All rights reserved. This information is not intended as a substitute for professional medical care. Always follow your healthcare professional's instructions.

## 2018-01-25 NOTE — ED AVS SNAPSHOT
[facility] Emergency Department    [address] [place] [address]    Phone:  [phone]    Fax:  [phone]                                       Radha Beckwith   MRN: [id number]    Department:  [facility] Emergency Department   Date of Visit:  1/25/2018           Patient Information     Date Of Birth          1975        Your diagnoses for this visit were:     RUQ abdominal pain        You were seen by Janae Dhaliwal MD and Dino Contreras MD.      Follow-up Information     Go to Tigre Lowry DO.    Specialty:  Surgery    Why:  tomorrow to discuss your RUQ abd pain further    Contact information:    [address] [place] [id number] [phone]          Discharge Instructions         *Abdominal Pain, Unknown Cause (Female)    The exact cause of your abdominal (stomach) pain is not certain. This does not mean that this is something to worry about, or the right tests were not done. Everyone likes to know the exact cause of the problem, but sometimes with abdominal pain, there is no clear-cut cause, and this could be a good thing. The good news is that your symptoms can be treated, and you will feel better.   Your condition does not seem serious now; however, sometimes the signs of a serious problem may take more time to appear. For this reason, it is important for you to watch for any new symptoms, problems, or worsening of your condition.  Over the next few days, the abdominal pain may come and go, or be continuous. Other common symptoms can include nausea and vomiting. Sometimes it can be difficult to tell if you feel nauseous, you may just feel bad and not associate that feeling with nausea. Constipation, diarrhea, and a fever may go along with the pain.  The pain may continue even if treated correctly over the following days. Depending on how things go, sometimes the cause can become clear and may require further or different  treatment. Additional evaluations, medications, or tests may be needed.  Home care  Your health care provider may prescribe medications for pain, symptoms, or an infection.  Follow the health care provider's instructions for taking these medications.  General care    Rest until your next exam. No strenuous activities.    Try to find positions that ease discomfort. A small pillow placed on the abdomen may help relieve pain.    Something warm on your abdomen (such as a heating pad) may help, but be careful not to burn yourself.  Diet    Do not force yourself to eat, especially if having cramps, vomiting, or diarrhea.    Water is important so you do not get dehydrated. Soup may also be good. Sports drinks may also help, especially if they are not too acidic. Make sure you don't drink sugary drinks as this can make things worse. Take liquids in small amounts. Do not guzzle them.    Caffeine sometimes makes the pain and cramping worse.    Avoid dairy products if you have vomiting or diarrhea.    Don't eat large amounts at a time. Wait a few minutes between bites.    Eat a diet low in fiber (called a low-residue diet). Foods allowed include refined breads, white rice, fruit and vegetable juices without pulp, tender meats. These foods will pass more easily through the intestine.    Avoid fried or fatty foods, dairy, alcohol and spicy foods until your symptoms go away.  Follow-up care  Follow up with your health care provider as instructed, or if your pain does not begin to improve in the next 24 hours.  When to seek medical care  Seek prompt medical care if any of the following occur:    Pain gets worse or moves to the right lower abdomen    New or worsening vomiting or diarrhea    Swelling of the abdomen    Unable to pass stool for more than three days    New fever over 101  F (38.3 C), or rising fever    Blood in vomit or bowel movements (dark red or black color)    Jaundice (yellow color of eyes and skin)    Weakness,  dizziness    Chest, arm, back, neck or jaw pain    Unexpected vaginal bleeding or missed period  Call 911  Call emergency services if any of the following occur:    Trouble breathing    Confusion    Fainting or loss of consciousness    Rapid heart rate    Seizure    4737-7251 [facility], [address] [place] [address], [place] [address]. All rights reserved. This information is not intended as a substitute for professional medical care. Always follow your healthcare professional's instructions.          Future Appointments        Provider Department Dept Phone Center    1/26/2018 8:00 AM Tigre Lowry,  [facility] [phone] [facility]      24 Hour Appointment Hotline       To make an appointment at any [facility], call [phone] ([phone]). If you don't have a family doctor or clinic, we will help you find one. [facility] are conveniently located to serve the needs of you and your family.             Review of your medicines      START taking        Dose / Directions Last dose taken    gabapentin 300 MG capsule   Commonly known as:  NEURONTIN   Dose:  300 mg   Quantity:  30 capsule        Take 1 capsule (300 mg) by mouth 3 times daily   Refills:  1        HYDROcodone-acetaminophen 5-325 MG per tablet   Commonly known as:  NORCO   Dose:  1-2 tablet   Quantity:  6 tablet        Take 1-2 tablets by mouth every 6 hours as needed   Refills:  0        lidocaine 5 % Patch   Commonly known as:  LIDODERM   Quantity:  30 patch        Apply up to 3 patches to painful area at once for up to 12 h within a 24 h period.  Remove after 12 hours.   Refills:  0          Our records show that you are taking the medicines listed below. If these are incorrect, please call your family doctor or clinic.        Dose / Directions Last dose taken    ACETAMINOPHEN PO   Dose:  1000 mg        Take 1,000 mg by mouth every 6 hours as needed for pain   Refills:  0        cetirizine 10 MG tablet    Commonly known as:  zyrTEC   Quantity:  30 tablet        TAKE ONE TABLET BY MOUTH EVERY EVENING   Refills:  3        LORazepam 1 MG tablet   Commonly known as:  ATIVAN   Dose:  0.5 mg   Quantity:  20 tablet        Take 0.5 tablets (0.5 mg) by mouth every 8 hours as needed for anxiety   Refills:  0        omeprazole 20 MG tablet   Dose:  20 mg   Quantity:  60 tablet        Take 1 tablet (20 mg) by mouth 2 times daily Take 30-60 minutes before a meal.   Refills:  3        ondansetron 4 MG tablet   Commonly known as:  ZOFRAN   Quantity:  18 tablet        TAKE ONE TABLET BY MOUTH EVERY 6 HOURS AS NEEDED FOR NAUSEA OR VOMITING   Refills:  10        order for DME   Quantity:  1 each        Blood pressure cuff   Refills:  0        propranolol 20 MG tablet   Commonly known as:  INDERAL   Dose:  20 mg   Quantity:  180 tablet        Take 1 tablet (20 mg) by mouth 2 times daily   Refills:  1        QUEtiapine 200 MG tablet   Commonly known as:  SEROquel   Dose:  200 mg   Quantity:  90 tablet        Take 1 tablet (200 mg) by mouth At Bedtime   Refills:  1        senna-docusate 8.6-50 MG per tablet   Commonly known as:  SENOKOT-S;PERICOLACE   Dose:  1-2 tablet   Quantity:  30 tablet        Take 1-2 tablets by mouth daily as needed for constipation   Refills:  3        sertraline 100 MG tablet   Commonly known as:  ZOLOFT   Dose:  200 mg   Quantity:  180 tablet        Take 2 tablets (200 mg) by mouth daily   Refills:  1                Prescriptions were sent or printed at these locations (3 Prescriptions)                   [facility], [place] [address] [facility]    [address] [facility] [address]    Telephone:  [phone]   Fax:  [phone]   Hours:                  E-Prescribed (2 of 3)         gabapentin (NEURONTIN) 300 MG capsule               lidocaine (LIDODERM) 5 % Patch                 Printed at Department/Unit printer (1 of 3)         HYDROcodone-acetaminophen (NORCO) 5-325 MG per tablet  "               Procedures and tests performed during your visit     CBC with platelets differential    Lipase    Peripheral IV catheter      Orders Needing Specimen Collection     None      Pending Results     No orders found from 2018 to 2018.            Pending Culture Results     No orders found from 2018 to 2018.            Pending Results Instructions     If you had any lab results that were not finalized at the time of your Discharge, you can call the ED Lab Result RN at [phone]. You will be contacted by this team for any positive Lab results or changes in treatment. The nurses are available 7 days a week from 10A to 6:30P.  You can leave a message 24 hours per day and they will return your call.        Thank you for choosing [facility]       Thank you for choosing [facility] for your care. Our goal is always to provide you with excellent care. Hearing back from our patients is one way we can continue to improve our services. Please take a few minutes to complete the written survey that you may receive in the mail after you visit with us. Thank you!        [employer] Information     [facility] lets you send messages to your doctor, view your test results, renew your prescriptions, schedule appointments and more. To sign up, go to [email or web address] . Click on \"Log in\" on the left side of the screen, which will take you to the Welcome page. Then click on \"Sign up Now\" on the right side of the page.     You will be asked to enter the access code listed below, as well as some personal information. Please follow the directions to create your username and password.     Your access code is: [email or web address]  Expires: 3/29/2018  3:51 PM     Your access code will  in 90 days. If you need help or a new code, please call your [facility] clinic or [phone].        Care EveryWhere ID     This is your Care EveryWhere ID. This could be used by other organizations to access your [facility] medical " records  [id number]        Equal Access to Services     DIONNE GALLARDO : Calderon Holt, carrie arce, yadi ramesh. So [place] -[phone].    ATENCIÓN: Si habla español, tiene a rueda disposición servicios gratuitos de asistencia lingüística. Llame al [phone].    We comply with applicable federal civil rights laws and [place] laws. We do not discriminate on the basis of race, color, national origin, age, disability, sex, sexual orientation, or gender identity.            After Visit Summary       This is your record. Keep this with you and show to your community pharmacist(s) and doctor(s) at your next visit.

## 2018-01-25 NOTE — ED NOTES
Pt comes in with complaints of abd pain that has been going on since Saturday. Pt states she was supposed to have a pre-op appt today for a cholecystectomy, but is too uncomfortable to make it. Pt complains of diaphoresis and nausea.

## 2018-01-25 NOTE — ED PROVIDER NOTES
"  History     Chief Complaint   Patient presents with     Abdominal Pain     The history is provided by the patient and medical records.     Radha Beckwith is a 42 year old female with a history of Anxiety, Gastric Ulcer, GERD treated with a PPI, Chronic Low Back Pain, and Opioid Dependence, who presents to the ED complaining of RUQ abdominal pain. The patient states that her RUQ abdominal pain initially began about 1 year ago and occurred infrequently. On 12/21 she was seen in the ED with abdominal pain at which time her Abdominal Pelvic CT was significant for a fatty liver but was otherwise negative. She was given a small prescription of Norco for pain and discharge. She returned the following day with similar pain. She had a RUQ U/S which was unremarkable. Her pain was thought to be caused by constipation and given Miralax. Patient did mention that she was frustrated that she did not get pain medication at that time, per the encounter note. At her ED follow up visit, she was set up with a HIDA scan. Her Hida Scan was done yesterday. She states that since the Cholecystokinin infusion, her RUQ pain increased to 10/10. Her Hida Scan was completely normal. She presented to the clinic following this complaining of severe RUQ pain and nausea. She was given 10 mg of IM Morphine with little relief. A surgical consult was set up with Dr. Lowry for today. She was told to return to the ED with worsening pain in the mean time. Some labs were collected yesterday including CBC, CMP, Amylase, and Lipase. Her ALT was elevated at 62 but her labs were otherwise normal. Today, her \"burning\" RUQ pain became so severe with associated diaphoresis and nausea that she couldn't wait for her surgical consult at 2:15 pm, and instead presented to the ED for pain control. She says that her last bowel movement was normal yesterday. Patient hasn't eaten today, but has consumed coffee. Patient denies any fever, vomiting, diarrhea, " hematochezia, melena, or other associated symptoms.     Of note, she also had a normal HIDA scan in 6/2013, normal RUQ U/S in 3/2017, and normal endoscopy in 4/2017. She had a laparoscopic vaginal hysterectomy in 10/2017.        Problem List:    Patient Active Problem List    Diagnosis Date Noted     S/P hysterectomy 12/30/2017     Priority: Medium     Psychophysiological insomnia 12/30/2017     Priority: Medium     Chronic bilateral low back pain without sciatica 12/30/2017     Priority: Medium     Papanicolaou smear of cervix with low grade squamous intraepithelial lesion (LGSIL) 07/07/2017     Priority: Medium     7/7/17 LSIL pap/+ HR HPV (not 16 or 18). Plan: colp bef 10/7/17.   8/10/17 [place]:Focal squamous atypia; cannot exclude koilocytosis   Plan: hysterectomy.  10/30/17 Hysterectomy surgery       Opioid dependence in remission (H) 08/02/2015     Priority: Medium     On suboxone treatement with Garland Rodriguez MD, [place].  (Noted in 2015).  Has been off suboxone since at least June of 2017. 8/9/2017        Anxiety attack 07/31/2015     Priority: Medium     Hypokalemia 06/23/2015     Priority: Medium     Atypical chest pain 06/23/2015     Priority: Medium     Tobacco abuse 06/23/2015     Priority: Medium     Hyperlipidemia LDL goal <100 01/29/2014     Priority: Medium     Ingrowing nail 01/09/2014     Priority: Medium     Anxiety 08/19/2013     Priority: Medium     GERD (gastroesophageal reflux disease) 07/28/2010     Priority: Medium     Takes omeprazole and metoclopramide       Restless legs 07/28/2010     Priority: Medium        Past Medical History:    Past Medical History:   Diagnosis Date     Abdominal pain, right lower quadrant 03/09/08     Anxiety attack 7/31/2015     Dehydration      Gastric ulcer 7/31/2015     GERD (gastroesophageal reflux disease) 7/28/2010     Opioid dependence in remission (H) 8/2/2015     Other and unspecified ovarian cyst      Papanicolaou smear of cervix with low grade squamous  intraepithelial lesion (LGSIL) 07/07/2017       Past Surgical History:    Past Surgical History:   Procedure Laterality Date     BIOPSY CERVICAL, LOCAL EXCISION, SINGLE/MULTIPLE N/A 8/10/2017    Procedure: BIOPSY CERVICAL, LOCAL EXCISION, SINGLE/MULTIPLE;;  Surgeon: Michael Chandler MD;  Location: [facility]     COLPOSCOPY, BIOPSY, COMBINED N/A 8/10/2017    Procedure: COMBINED COLPOSCOPY, BIOPSY;  Colposcopy with Cervical Biopsies and Endometrial Biopsy, Exam with Ultrasound;  Surgeon: Michael Chandler MD;  Location: [facility]     ESOPHAGOSCOPY, GASTROSCOPY, DUODENOSCOPY (EGD), COMBINED N/A 4/17/2017    Procedure: COMBINED ESOPHAGOSCOPY, GASTROSCOPY, DUODENOSCOPY (EGD);  Surgeon: Ibrahima Esposito MD;  Location: [facility]     EXAM UNDER ANESTHESIA PELVIC N/A 8/10/2017    Procedure: EXAM UNDER ANESTHESIA PELVIC;;  Surgeon: Michael Chandler MD;  Location: [facility]     HC UGI ENDOSCOPY, SIMPLE EXAM  01/07/08     HYSTERECTOMY       LAPAROSCOPIC HYSTERECTOMY TOTAL N/A 10/30/2017    Procedure: LAPAROSCOPIC HYSTERECTOMY TOTAL;  LAPAROSCOPIC HYSTERECTOMY TOTAL POSSIBLE SALPINGO-OOPHERECTOMY (BILATERAL);  Surgeon: Michael Chandler MD;  Location: [facility]       Family History:    Family History   Problem Relation Age of Onset     Depression Mother      Respiratory Mother      Chronic Obstructive Pulmonary Disease Mother      CEREBROVASCULAR DISEASE Father      Brain anyeurism     Adrenal Disorder Other      Chronic Obstructive Pulmonary Disease Other      Breast Cancer Cousin        Social History:  Marital Status:  Single [1]  Social History   Substance Use Topics     Smoking status: Current Every Day Smoker     Packs/day: 0.25     Years: 20.00     Types: Cigarettes     Smokeless tobacco: Never Used      Comment: 5-6 cigs daily     Alcohol use Yes      Comment: occasional drinks        Medications:      ACETAMINOPHEN PO   Morphine Sulfate, PF, 10 MG/ML injection   cetirizine (ZYRTEC) 10 MG tablet    atorvastatin (LIPITOR) 10 MG tablet   LORazepam (ATIVAN) 1 MG tablet   omeprazole 20 MG tablet   QUEtiapine (SEROQUEL) 200 MG tablet   sertraline (ZOLOFT) 100 MG tablet   lidocaine (LIDODERM) 5 % Patch   triamcinolone (KENALOG) 0.1 % cream   VITAMIN D, CHOLECALCIFEROL, PO   sucralfate (CARAFATE) 1 GM tablet   senna-docusate (SENOKOT-S;PERICOLACE) 8.6-50 MG per tablet   propranolol (INDERAL) 20 MG tablet   ondansetron (ZOFRAN) 4 MG tablet   order for DME         Review of Systems   Constitutional: Positive for appetite change (decreased). Negative for fever.   Gastrointestinal: Positive for abdominal pain (RUQ, burning) and nausea. Negative for anal bleeding, blood in stool, constipation, diarrhea and vomiting.   All other systems reviewed and are negative.      Physical Exam   BP: (!) 170/134  Heart Rate: 86  Temp: 98.8  F (37.1  C)  Resp: 14  Weight: 73 kg (161 lb)  SpO2: 96 %      Physical Exam   Constitutional: She is oriented to person, place, and time. No distress.   HENT:   Head: Normocephalic and atraumatic.   Right Ear: Tympanic membrane, external ear and ear canal normal.   Left Ear: Tympanic membrane, external ear and ear canal normal.   Mouth/Throat: Oropharynx is clear and moist.   Eyes: Conjunctivae and EOM are normal.   Neck: Normal range of motion. Neck supple.   Cardiovascular: Normal rate, regular rhythm, normal heart sounds and intact distal pulses.    No murmur heard.  Pulmonary/Chest: Effort normal and breath sounds normal. No respiratory distress. She has no wheezes. She has no rales. She exhibits tenderness (right).   Abdominal: Soft. Bowel sounds are normal. She exhibits no distension and no mass. There is tenderness in the right upper quadrant. There is no rebound and no guarding.   Musculoskeletal: Normal range of motion.   Lymphadenopathy:     She has no cervical adenopathy.   Neurological: She is alert and oriented to person, place, and time.   Skin: Skin is warm and dry. No rash noted.  She is not diaphoretic. No pallor.   Psychiatric: She has a normal mood and affect. Her behavior is normal.   Nursing note and vitals reviewed.      ED Course     ED Course     Procedures            Results for orders placed or performed during the hospital encounter of 01/25/18   CBC with platelets differential   Result Value Ref Range    WBC 10.4 4.0 - 11.0 10e9/L    RBC Count 4.28 3.8 - 5.2 10e12/L    Hemoglobin 13.5 11.7 - 15.7 g/dL    Hematocrit 41.0 35.0 - 47.0 %    MCV 96 78 - 100 fl    MCH 31.5 26.5 - 33.0 pg    MCHC 32.9 31.5 - 36.5 g/dL    RDW 12.8 10.0 - 15.0 %    Platelet Count 190 150 - 450 10e9/L    Diff Method Automated Method     % Neutrophils 80.0 %    % Lymphocytes 13.2 %    % Monocytes 5.1 %    % Eosinophils 1.3 %    % Basophils 0.2 %    % Immature Granulocytes 0.2 %    Absolute Neutrophil 8.3 1.6 - 8.3 10e9/L    Absolute Lymphocytes 1.4 0.8 - 5.3 10e9/L    Absolute Monocytes 0.5 0.0 - 1.3 10e9/L    Absolute Eosinophils 0.1 0.0 - 0.7 10e9/L    Absolute Basophils 0.0 0.0 - 0.2 10e9/L    Abs Immature Granulocytes 0.0 0 - 0.4 10e9/L   Lipase   Result Value Ref Range    Lipase 307 73 - 393 U/L       No results found for this or any previous visit (from the past 24 hour(s)).    Medications   metoclopramide (REGLAN) injection 5 mg (not administered)   HYDROmorphone (PF) (DILAUDID) injection 0.5 mg (not administered)   ketorolac (TORADOL) injection 30 mg (not administered)     MDM: Radha is a 42 year old female who presents to the ED complaining of RUQ abdominal pain for over a year, most severe in the last few days. Patient's pain has been evaluated several times via Abdominal CT, Abdominal U/S. Hida Scans, and Endoscopies, all of which were normal. Patient was scheduled for a surgical consult today but the pain was unbearable so she presented to the ED for pain control. Her blood pressure is elevated at 170/134 mmHg. She is otherwise afebrile with normal vitals. On exam, she exhibits right upper  quadrant and right chest wall tenderness. Exam is otherwise unremarkable.   Patient feels better after the above medications were given.  I did consult surgery and spoke with Dr. Rodriguez well who the patient was supposed to see today and he agreed that this is not someone that they would do emergent surgery on in light of normal workup labs and no signs of cholecystitis.  He would definitely recommend having a conversation in clinic about the risk and benefits of doing surgery.  We had a long conversation about other things that could be causing her right upper quadrant pain.  She has had problems with her back before, this certainly could be nerve type pain possibly from her back.  This also could be more musculoskeletal in nature.  Patient is on Suboxone so we had a long conversation about why pain medications or not going to work well for her.  She states that her pain doctor knows that she is on oxycodone and is actually given that to her a few times especially when she last had her hysterectomy.  Reluctantly I am going to send her home with 6 pills of hydrocodone from here.  I also like to try other pain regimens including gabapentin which we will try now and patient was given a Lidoderm patch also.  We have rescheduled the patient's appointment for surgery for tomorrow.    Assessments & Plan   Right upper quadrant abdominal pain     I have reviewed the nursing notes.    I have reviewed the findings, diagnosis, plan and need for follow up with the patient.      This document serves as a record of services personally performed by Dino Contreras MD. It was created on their behalf by Alba Recinos, a trained medical scribe. The creation of this record is based on the provider's personal observations and the statements of the patient. This document has been checked and approved by the attending provider.    Note: Chart documentation done in part with Dragon Voice Recognition software. Although reviewed after  completion, some word and grammatical errors may remain.    1/25/2018   [facility] EMERGENCY DEPARTMENT     Dino Contreras MD  01/25/18 [id number]

## 2018-01-25 NOTE — ED AVS SNAPSHOT
[facility] Emergency Department    [address] [place] [address]    Phone:  [phone]    Fax:  [phone]                                       Radha Beckwith   MRN: [id number]    Department:  [facility] Emergency Department   Date of Visit:  1/25/2018           After Visit Summary Signature Page     I have received my discharge instructions, and my questions have been answered. I have discussed any challenges I see with this plan with the nurse or doctor.    ..........................................................................................................................................  Patient/Patient Representative Signature      ..........................................................................................................................................  Patient Representative Print Name and Relationship to Patient    ..................................................               ................................................  Date                                            Time    ..........................................................................................................................................  Reviewed by Signature/Title    ...................................................              ..............................................  Date                                                            Time

## 2018-01-26 ENCOUNTER — TELEPHONE (OUTPATIENT)
Dept: FAMILY MEDICINE | Facility: OTHER | Age: 43
End: 2018-01-26

## 2018-01-26 ENCOUNTER — OFFICE VISIT (OUTPATIENT)
Dept: SURGERY | Facility: CLINIC | Age: 43
End: 2018-01-26
Payer: COMMERCIAL

## 2018-01-26 ENCOUNTER — TELEPHONE (OUTPATIENT)
Dept: SURGERY | Facility: CLINIC | Age: 43
End: 2018-01-26

## 2018-01-26 VITALS — OXYGEN SATURATION: 96 % | HEART RATE: 100 BPM | BODY MASS INDEX: 26.88 KG/M2 | TEMPERATURE: 97.5 F | WEIGHT: 161.5 LBS

## 2018-01-26 DIAGNOSIS — K80.50 BILIARY COLIC: Primary | ICD-10-CM

## 2018-01-26 DIAGNOSIS — F41.9 ANXIETY: ICD-10-CM

## 2018-01-26 PROCEDURE — 99244 OFF/OP CNSLTJ NEW/EST MOD 40: CPT | Performed by: SURGERY

## 2018-01-26 RX ORDER — CEFAZOLIN SODIUM 2 G/100ML
2 INJECTION, SOLUTION INTRAVENOUS
Status: CANCELLED | OUTPATIENT
Start: 2018-01-26

## 2018-01-26 RX ORDER — LORAZEPAM 1 MG/1
0.5 TABLET ORAL EVERY 8 HOURS PRN
Qty: 5 TABLET | Refills: 0 | Status: SHIPPED | OUTPATIENT
Start: 2018-01-26 | End: 2018-01-31

## 2018-01-26 ASSESSMENT — PAIN SCALES - GENERAL: PAINLEVEL: SEVERE PAIN (6)

## 2018-01-26 NOTE — PATIENT INSTRUCTIONS
SMOKING CESSATION  What's in cigarette smoke? - Cigarette smoke contains over 4,000 chemicals. Nicotine is one of the main ingredients which is an insecticide/herbicide. It is poisonous to our nervous system, increases blood clotting risk, and decreases the body's defenses to fight off infection. Another chemical is Carbon Monoxide is an asphyxiating gas that permanently binds to blood cells and blocks the transport of oxygen. This leads to tissue death and decreases your metabolism. Tar is a chemical that coats your lungs and trachea which impairs new oxygen coming in and carbon dioxide getting out of your body.   How does smoking impact surgery? - Smoking is particularly hazardous with regards to surgery. Surgery puts stress on the body and a smoker's body is already under strain from these chemicals. Putting the two together, especially for an elective surgery, could be a recipe for disaster. Smoking before and after surgery increases your risk of heart problems, slow wound healing, delayed bone healing, blood clots, wound infection and anesthesia complications.   What are the benefits of quitting? - In 20 minutes your blood pressure will drop back down to normal. In 8 hours the carbon monoxide (a toxic gas) levels in your blood stream will drop by half, and oxygen levels will return to normal. In 48 hours your chance of having a heart attack will have decreased. All nicotine will have left your body. Your sense of taste and smell will return to a normal level. In 72 hours your bronchial tubes will relax, and your energy levels will increase. In 2 weeks your circulation will increase, and it will continue to improve for the next 10 weeks.    Recommendations for elective surgery - Ideally, patients should quit smoking 8 weeks before and at least 2 weeks after elective surgery in order to avoid complications. Simply cutting back on the amount of cigarettes smoked per day does not offer any benefit or decrease the  risk of poor wound healing, heart problems, and infection. Smokers should also start taking Vitamin C and B for two weeks before surgery and two weeks after surgery.    Ways to Stop Smokin. Nicotine patches, lozenges, or gum  2. Support Groups  3. Medications (see below)    List of Medications:  1. Varenicline Tartrate (CHANTIX)   2. Bupropion HCL (WELLBUTRIN, ZYBAN) - note: make sure Wellbutrin is for smoking cessation and not other issues   3. Nicotine Patch (NICODERM)   4. Nicotine Inhaler (NICOTROL)   5. Nicotine Gum Nicotine Polacrilex   6. Nicotine Lozenge: Nicotine Polacrilex (COMMIT)   * [place] offers a smoking support group as well!  Please visit: [email or web address]  If you are interested in these, ask about getting a prescription or talk to your primary care doctor about what may be the best way for you to quit.

## 2018-01-26 NOTE — NURSING NOTE
"Chief Complaint   Patient presents with     Consult     referring Ben     Abdominal Pain     right sided abd pain, with some Nausa x 1 weeks       Initial Pulse 100  Temp 97.5  F (36.4  C) (Temporal)  Wt 73.3 kg (161 lb 8 oz)  LMP  (LMP Unknown)  SpO2 96%  BMI 26.88 kg/m2 Estimated body mass index is 26.88 kg/(m^2) as calculated from the following:    Height as of 1/24/18: 1.651 m (5' 5\").    Weight as of this encounter: 73.3 kg (161 lb 8 oz).  Medication Reconciliation: complete    "

## 2018-01-26 NOTE — TELEPHONE ENCOUNTER
Pt overdue for follow up.  Was asked at her visit in December to schedule f/u on anxiety at the end of January.  Please help her schedule.

## 2018-01-26 NOTE — TELEPHONE ENCOUNTER
Surgery Scheduled    Date of Surgery 2/2/18 Time of Surgery   Procedure: laparoscopic cholecystectomy possible open  Hospital/Surgical Facility: [facility]  Surgeon: Dr. Lowry  Type of Anesthesia : General  Pre-op 1/31/18 with Marisa Duval  Post op:2/16/18 with Dr. Lowry        Surgery packet mailed to patient's home address. Patients instructed to arrive 1 hours prior to surgery. Patient understood and agrees to plan.    Gisselle Cook  Surgery Scheduler

## 2018-01-26 NOTE — MR AVS SNAPSHOT
After Visit Summary   1/26/2018    Radha Beckwith    MRN: [id number]           Patient Information     Date Of Birth          1975        Visit Information        Provider Department      1/26/2018 8:00 AM Tigre Lowry, DO [facility] Instructions    SMOKING CESSATION  What's in cigarette smoke? - Cigarette smoke contains over 4,000 chemicals. Nicotine is one of the main ingredients which is an insecticide/herbicide. It is poisonous to our nervous system, increases blood clotting risk, and decreases the body's defenses to fight off infection. Another chemical is Carbon Monoxide is an asphyxiating gas that permanently binds to blood cells and blocks the transport of oxygen. This leads to tissue death and decreases your metabolism. Tar is a chemical that coats your lungs and trachea which impairs new oxygen coming in and carbon dioxide getting out of your body.   How does smoking impact surgery? - Smoking is particularly hazardous with regards to surgery. Surgery puts stress on the body and a smoker's body is already under strain from these chemicals. Putting the two together, especially for an elective surgery, could be a recipe for disaster. Smoking before and after surgery increases your risk of heart problems, slow wound healing, delayed bone healing, blood clots, wound infection and anesthesia complications.   What are the benefits of quitting? - In 20 minutes your blood pressure will drop back down to normal. In 8 hours the carbon monoxide (a toxic gas) levels in your blood stream will drop by half, and oxygen levels will return to normal. In 48 hours your chance of having a heart attack will have decreased. All nicotine will have left your body. Your sense of taste and smell will return to a normal level. In 72 hours your bronchial tubes will relax, and your energy levels will increase. In 2 weeks your circulation will increase, and it will continue  to improve for the next 10 weeks.    Recommendations for elective surgery - Ideally, patients should quit smoking 8 weeks before and at least 2 weeks after elective surgery in order to avoid complications. Simply cutting back on the amount of cigarettes smoked per day does not offer any benefit or decrease the risk of poor wound healing, heart problems, and infection. Smokers should also start taking Vitamin C and B for two weeks before surgery and two weeks after surgery.    Ways to Stop Smokin. Nicotine patches, lozenges, or gum  2. Support Groups  3. Medications (see below)    List of Medications:  1. Varenicline Tartrate (CHANTIX)   2. Bupropion HCL (WELLBUTRIN, ZYBAN) - note: make sure Wellbutrin is for smoking cessation and not other issues   3. Nicotine Patch (NICODERM)   4. Nicotine Inhaler (NICOTROL)   5. Nicotine Gum Nicotine Polacrilex   6. Nicotine Lozenge: Nicotine Polacrilex (COMMIT)   * [place] offers a smoking support group as well!  Please visit: [email or web address]  If you are interested in these, ask about getting a prescription or talk to your primary care doctor about what may be the best way for you to quit.                 Follow-ups after your visit        Who to contact     If you have questions or need follow up information about today's clinic visit or your schedule please contact [facility] directly at [phone].  Normal or non-critical lab and imaging results will be communicated to you by MyChart, letter or phone within 4 business days after the clinic has received the results. If you do not hear from us within 7 days, please contact the clinic through MyChart or phone. If you have a critical or abnormal lab result, we will notify you by phone as soon as possible.  Submit refill requests through [facility] or call your pharmacy and they will forward the refill request to us. Please allow 3 business days for your refill to be completed.           "Additional Information About Your Visit        MyChart Information     [employer] lets you send messages to your doctor, view your test results, renew your prescriptions, schedule appointments and more. To sign up, go to [email or web address] . Click on \"Log in\" on the left side of the screen, which will take you to the Welcome page. Then click on \"Sign up Now\" on the right side of the page.     You will be asked to enter the access code listed below, as well as some personal information. Please follow the directions to create your username and password.     Your access code is: [email or web address]  Expires: 3/29/2018  3:51 PM     Your access code will  in 90 days. If you need help or a new code, please call your [place] clinic or [phone].        Care EveryWhere ID     This is your Care EveryWhere ID. This could be used by other organizations to access your [place] medical records  [id number]        Your Vitals Were     Pulse Temperature Last Period Pulse Oximetry BMI (Body Mass Index)       100 97.5  F (36.4  C) (Temporal) (LMP Unknown) 96% 26.88 kg/m2        Blood Pressure from Last 3 Encounters:   18 120/70   18 124/68   17 134/88    Weight from Last 3 Encounters:   18 73.3 kg (161 lb 8 oz)   18 73 kg (161 lb)   18 73.3 kg (161 lb 8 oz)              Today, you had the following     No orders found for display       Primary Care Provider Office Phone # Fax #    [facility] [phone] [id number] [id number]        Equal Access to Services     DIONNE GALLARDO : Hadii quintin Holt, tomasada lujayaadaha, qaybta kaalmada porfirio, yadi luong. So [facility] [phone].    ATENCIÓN: Si habla español, tiene a rueda disposición servicios gratuitos de asistencia lingüística. Llame al [phone].    We comply with applicable federal civil rights laws and [place] laws. We do not discriminate on the " basis of race, color, national origin, age, disability, sex, sexual orientation, or gender identity.            Thank you!     Thank you for choosing [facility]  for your care. Our goal is always to provide you with excellent care. Hearing back from our patients is one way we can continue to improve our services. Please take a few minutes to complete the written survey that you may receive in the mail after your visit with us. Thank you!             Your Updated Medication List - Protect others around you: Learn how to safely use, store and throw away your medicines at www.disposemymeds.org.          This list is accurate as of 1/26/18  8:11 AM.  Always use your most recent med list.                   Brand Name Dispense Instructions for use Diagnosis    ACETAMINOPHEN PO      Take 1,000 mg by mouth every 6 hours as needed for pain        cetirizine 10 MG tablet    zyrTEC    30 tablet    TAKE ONE TABLET BY MOUTH EVERY EVENING    Seasonal allergic rhinitis, unspecified chronicity, unspecified trigger       gabapentin 300 MG capsule    NEURONTIN    30 capsule    Take 1 capsule (300 mg) by mouth 3 times daily        HYDROcodone-acetaminophen 5-325 MG per tablet    NORCO    6 tablet    Take 1-2 tablets by mouth every 6 hours as needed        lidocaine 5 % Patch    LIDODERM    30 patch    Apply up to 3 patches to painful area at once for up to 12 h within a 24 h period.  Remove after 12 hours.        LORazepam 1 MG tablet    ATIVAN    20 tablet    Take 0.5 tablets (0.5 mg) by mouth every 8 hours as needed for anxiety    Anxiety       omeprazole 20 MG tablet     60 tablet    Take 1 tablet (20 mg) by mouth 2 times daily Take 30-60 minutes before a meal.    Gastroesophageal reflux disease, esophagitis presence not specified       ondansetron 4 MG tablet    ZOFRAN    18 tablet    TAKE ONE TABLET BY MOUTH EVERY 6 HOURS AS NEEDED FOR NAUSEA OR VOMITING    Anxiety attack       order for DME     1 each    Blood  pressure cuff    Elevated blood pressure reading without diagnosis of hypertension       propranolol 20 MG tablet    INDERAL    180 tablet    Take 1 tablet (20 mg) by mouth 2 times daily    Sinus tachycardia, Anxiety       QUEtiapine 200 MG tablet    SEROquel    90 tablet    Take 1 tablet (200 mg) by mouth At Bedtime    Anxiety, Psychophysiological insomnia       senna-docusate 8.6-50 MG per tablet    SENOKOT-S;PERICOLACE    30 tablet    Take 1-2 tablets by mouth daily as needed for constipation    S/P hysterectomy       sertraline 100 MG tablet    ZOLOFT    180 tablet    Take 2 tablets (200 mg) by mouth daily    Anxiety

## 2018-01-26 NOTE — TELEPHONE ENCOUNTER
Spoke with patient, she has an appointment on 1/31/2018 with arnel Story, she is wondering if this can be discussed at that appointment.  Patient is having her gallbladder removed on 2/2/2018, she is out of medication and is wondering if she can get enough medication to get through that appointment.  She states she is in A LOT of pain as well, please advise and patient would like a call back from the team regarding her refill  Thanks  Jahaira Rivers RT (R)

## 2018-01-26 NOTE — PROGRESS NOTES
[facility]  [address] [address]  [phone]  Dept: [phone]    PRE-OP EVALUATION:  Today's date: 2018    Radha Beckwith (: 1975) presents for pre-operative evaluation assessment as requested by Dr. Lowry.  She requires evaluation and anesthesia risk assessment prior to undergoing surgery/procedure for treatment of gall bladder removal .  Proposed procedure: Laparoscopic Cholecystectomy    Date of Surgery/ Procedure: 2018  Time of Surgery/ Procedure: 2:15 PM  Hospital/Surgical Facility: [facility]  Fax number for surgical facility:   Primary Physician: Pacheco Oviedo Hong  Type of Anesthesia Anticipated: General    Patient has a Health Care Directive or Living Will:  NO    Preop Questions 2018   1.  Do you have a history of heart attack, stroke, stent, bypass or surgery on an artery in the head, neck, heart or legs? No   2.  Do you ever have any pain or discomfort in your chest? No   3.  Do you have a history of  Heart Failure? No   4.   Are you troubled by shortness of breath when:  walking on a level surface, or up a slight hill, or at night? No   5.  Do you currently have a cold, bronchitis or other respiratory infection? No   6.  Do you have a cough, shortness of breath, or wheezing? No   7.  Do you sometimes get pains in the calves of your legs when you walk? No   8. Do you or anyone in your family have previous history of blood clots? No   9.  Do you or does anyone in your family have a serious bleeding problem such as prolonged bleeding following surgeries or cuts? No   10. Have you ever had problems with anemia or been told to take iron pills? No   11. Have you had any abnormal blood loss such as black, tarry or bloody stools, or abnormal vaginal bleeding? No   12. Have you ever had a blood transfusion? No   13. Have you or any of your relatives ever had problems with anesthesia? No   14. Do you have sleep apnea,  excessive snoring or daytime drowsiness? No   15. Do you have any prosthetic heart valves? No   16. Do you have prosthetic joints? No   17. Is there any chance that you may be pregnant? No         HPI:                                                      Brief HPI related to upcoming procedure: has had chronic RUQ abdominal pain with episodes of intense pain that have brought her to the ER. With her HIDA scan she exhibited 10/10 severity pain (similar to the pretest symptoms) with cholecystokinin infusion. Decision to remove gallbladder was made.       Patient has history of anxiety and depression and is taking Zoloft, Seroquel and PRN lorazepam. She also has history of opioid dependence for treatment of migraines and has been on suboxone prescribed by Dr. Rodriguez at [facility] for the past 10 years. She takes 1/2 tablet daily which is the lowest dose for the past 6 years. She continues to smoke 1/4 pack per day      MEDICAL HISTORY:                                                      Patient Active Problem List    Diagnosis Date Noted     S/P hysterectomy 12/30/2017     Priority: Medium     Psychophysiological insomnia 12/30/2017     Priority: Medium     Chronic bilateral low back pain without sciatica 12/30/2017     Priority: Medium     Papanicolaou smear of cervix with low grade squamous intraepithelial lesion (LGSIL) 07/07/2017     Priority: Medium     7/7/17 LSIL pap/+ HR HPV (not 16 or 18). Plan: colp bef 10/7/17.   8/10/17 [place]:Focal squamous atypia; cannot exclude koilocytosis   Plan: hysterectomy.  10/30/17 Hysterectomy surgery       Opioid dependence in remission (H) 08/02/2015     Priority: Medium     On suboxone treatement with Garland Rodriguez MD, [place].  (Noted in 2015).  Has been off suboxone since at least June of 2017. 8/9/2017        Anxiety attack 07/31/2015     Priority: Medium     Hypokalemia 06/23/2015     Priority: Medium     Atypical chest pain 06/23/2015     Priority: Medium     Tobacco  abuse 06/23/2015     Priority: Medium     Hyperlipidemia LDL goal <100 01/29/2014     Priority: Medium     Ingrowing nail 01/09/2014     Priority: Medium     Anxiety 08/19/2013     Priority: Medium     GERD (gastroesophageal reflux disease) 07/28/2010     Priority: Medium     Takes omeprazole and metoclopramide       Restless legs 07/28/2010     Priority: Medium      Past Medical History:   Diagnosis Date     Abdominal pain, right lower quadrant 03/09/08    Admit. Discharged 03/10/08     Anxiety attack 7/31/2015     Dehydration      Gastric ulcer 7/31/2015     GERD (gastroesophageal reflux disease) 7/28/2010    Takes omeprazole and metoclopramide      Opioid dependence in remission (H) 8/2/2015     Other and unspecified ovarian cyst      Papanicolaou smear of cervix with low grade squamous intraepithelial lesion (LGSIL) 07/07/2017     Past Surgical History:   Procedure Laterality Date     BIOPSY CERVICAL, LOCAL EXCISION, SINGLE/MULTIPLE N/A 8/10/2017    Procedure: BIOPSY CERVICAL, LOCAL EXCISION, SINGLE/MULTIPLE;;  Surgeon: Michael Chandler MD;  Location: [facility] OR     COLPOSCOPY, BIOPSY, COMBINED N/A 8/10/2017    Procedure: COMBINED COLPOSCOPY, BIOPSY;  Colposcopy with Cervical Biopsies and Endometrial Biopsy, Exam with Ultrasound;  Surgeon: Michael Chandler MD;  Location: [facility] OR     ESOPHAGOSCOPY, GASTROSCOPY, DUODENOSCOPY (EGD), COMBINED N/A 4/17/2017    Procedure: COMBINED ESOPHAGOSCOPY, GASTROSCOPY, DUODENOSCOPY (EGD);  Surgeon: Ibrahima Esposito MD;  Location: [facility] GI     EXAM UNDER ANESTHESIA PELVIC N/A 8/10/2017    Procedure: EXAM UNDER ANESTHESIA PELVIC;;  Surgeon: Michael Chandler MD;  Location: [facility] OR     HC UGI ENDOSCOPY, SIMPLE EXAM  01/07/08     HYSTERECTOMY       LAPAROSCOPIC HYSTERECTOMY TOTAL N/A 10/30/2017    Procedure: LAPAROSCOPIC HYSTERECTOMY TOTAL;  LAPAROSCOPIC HYSTERECTOMY TOTAL POSSIBLE SALPINGO-OOPHERECTOMY (BILATERAL);  Surgeon: Michael Chandler MD;   Location: [facility] OR     Current Outpatient Prescriptions   Medication Sig Dispense Refill     buprenorphine-naloxone (SUBOXONE) 2-0.5 MG SUBL sublingual tablet Place 0.5 tablets under the tongue 2 times daily  0     LORazepam (ATIVAN) 1 MG tablet Take 1 tablet (1 mg) by mouth daily as needed for anxiety (or panic attack. Use sparingly. Do not take before driving.) 20 tablet 0     ACETAMINOPHEN PO Take 1,000 mg by mouth every 6 hours as needed for pain       lidocaine (LIDODERM) 5 % Patch Apply up to 3 patches to painful area at once for up to 12 h within a 24 h period.  Remove after 12 hours. 30 patch 0     cetirizine (ZYRTEC) 10 MG tablet TAKE ONE TABLET BY MOUTH EVERY EVENING 30 tablet 3     omeprazole 20 MG tablet Take 1 tablet (20 mg) by mouth 2 times daily Take 30-60 minutes before a meal. 60 tablet 3     QUEtiapine (SEROQUEL) 200 MG tablet Take 1 tablet (200 mg) by mouth At Bedtime 90 tablet 1     sertraline (ZOLOFT) 100 MG tablet Take 2 tablets (200 mg) by mouth daily 180 tablet 1     senna-docusate (SENOKOT-S;PERICOLACE) 8.6-50 MG per tablet Take 1-2 tablets by mouth daily as needed for constipation 30 tablet 3     propranolol (INDERAL) 20 MG tablet Take 1 tablet (20 mg) by mouth 2 times daily 180 tablet 1     ondansetron (ZOFRAN) 4 MG tablet TAKE ONE TABLET BY MOUTH EVERY 6 HOURS AS NEEDED FOR NAUSEA OR VOMITING 18 tablet 10     HYDROcodone-acetaminophen (NORCO) 5-325 MG per tablet Take 1-2 tablets by mouth every 6 hours as needed (Patient not taking: Reported on 1/31/2018) 6 tablet 0     gabapentin (NEURONTIN) 300 MG capsule Take 1 capsule (300 mg) by mouth 3 times daily (Patient not taking: Reported on 1/31/2018) 30 capsule 1     order for DME Blood pressure cuff (Patient not taking: Reported on 1/31/2018) 1 each 0     OTC products: does not take any NSAIDs    Allergies   Allergen Reactions     Cafergot      12-            GI problems-     Seasonal Allergies      Sumatriptan      vomits after giving  "herself a shot     Compazine Anxiety     Droperidol Anxiety     Nubain [Nalbuphine Hcl] Anxiety     Prochlorperazine Palpitations     Uncontrolled movement      Latex Allergy: NO    Social History   Substance Use Topics     Smoking status: Current Every Day Smoker     Packs/day: 0.25     Years: 20.00     Types: Cigarettes     Smokeless tobacco: Never Used      Comment: 5-6 cigs daily     Alcohol use Yes      Comment: occasional drinks     History   Drug Use No       REVIEW OF SYSTEMS:                                                    C: NEGATIVE for fever, chills, change in weight  I: NEGATIVE for worrisome rashes, moles or lesions  E: NEGATIVE for vision changes or irritation  E/M: NEGATIVE for ear, mouth and throat problems  R: NEGATIVE for significant cough or SOB  B: NEGATIVE for masses, tenderness or discharge  CV: NEGATIVE for chest pain, palpitations or peripheral edema  GI: NEGATIVE for nausea, abdominal pain, heartburn, or change in bowel habits  : NEGATIVE for frequency, dysuria, or hematuria  M: NEGATIVE for significant arthralgias or myalgia  N: NEGATIVE for weakness, dizziness or paresthesias  E: NEGATIVE for temperature intolerance, skin/hair changes  H: NEGATIVE for bleeding problems  P: NEGATIVE for changes in mood or affect    EXAM:                                                    /70 (BP Location: Right arm, Patient Position: Sitting, Cuff Size: Adult Regular)  Pulse 80  Temp 98  F (36.7  C) (Temporal)  Resp 20  Ht 5' 5\" (1.651 m)  Wt 163 lb 8 oz (74.2 kg)  LMP  (LMP Unknown)  BMI 27.21 kg/m2    GENERAL APPEARANCE: healthy, alert and no distress     EYES: EOMI, PERRL     HENT: ear canals and TM's normal and nose and mouth without ulcers or lesions     NECK: no adenopathy, no asymmetry, masses, or scars and thyroid normal to palpation     RESP: lungs clear to auscultation - no rales, rhonchi or wheezes     CV: regular rates and rhythm, normal S1 S2, no S3 or S4 and no murmur, click " or rub     ABDOMEN:  soft, nontender, no HSM or masses and bowel sounds normal     MS: extremities normal- no gross deformities noted, no evidence of inflammation in joints, FROM in all extremities.     SKIN: no suspicious lesions or rashes     NEURO: Normal strength and tone, sensory exam grossly normal, mentation intact and speech normal     PSYCH: mentation appears normal. and affect normal/bright     LYMPHATICS: No axillary, cervical, or supraclavicular nodes    DIAGNOSTICS:                                                    EKG: Not indicated due to non-vascular surgery and low risk of event (age <65 and without cardiac risk factors)    HIDA SCAN  IMPRESSION:  1. No evidence for common bile duct or cystic duct obstruction.  2. Normal gallbladder ejection fraction.  3. Pain described 10/10 severity pain (similar to the pretest  symptoms) with cholecystokinin infusion.     I discussed these findings with Dr. Luther Reyes on 1/24/2018 at  approximately 1:55 PM.     WILTON HARGROVE MD    Results for orders placed or performed during the hospital encounter of 01/25/18   CBC with platelets differential   Result Value Ref Range    WBC 10.4 4.0 - 11.0 10e9/L    RBC Count 4.28 3.8 - 5.2 10e12/L    Hemoglobin 13.5 11.7 - 15.7 g/dL    Hematocrit 41.0 35.0 - 47.0 %    MCV 96 78 - 100 fl    MCH 31.5 26.5 - 33.0 pg    MCHC 32.9 31.5 - 36.5 g/dL    RDW 12.8 10.0 - 15.0 %    Platelet Count 190 150 - 450 10e9/L    Diff Method Automated Method     % Neutrophils 80.0 %    % Lymphocytes 13.2 %    % Monocytes 5.1 %    % Eosinophils 1.3 %    % Basophils 0.2 %    % Immature Granulocytes 0.2 %    Absolute Neutrophil 8.3 1.6 - 8.3 10e9/L    Absolute Lymphocytes 1.4 0.8 - 5.3 10e9/L    Absolute Monocytes 0.5 0.0 - 1.3 10e9/L    Absolute Eosinophils 0.1 0.0 - 0.7 10e9/L    Absolute Basophils 0.0 0.0 - 0.2 10e9/L    Abs Immature Granulocytes 0.0 0 - 0.4 10e9/L   Lipase   Result Value Ref Range    Lipase 307 73 - 393 U/L     Potassium   Date  Value Ref Range Status   01/24/2018 3.8 3.4 - 5.3 mmol/L Final     Creatinine   Date Value Ref Range Status   01/24/2018 0.70 0.52 - 1.04 mg/dL Final       Recent Labs   Lab Test  01/25/18   1440  01/24/18   1404  12/22/17   1545   03/24/17   0803   HGB  13.5  12.9  13.6   < >  14.5   PLT  190  184  214   < >  226   INR   --    --    --    --   0.87   NA   --   138  137   < >  144   There is readily you were worried in order to like. Development basically family he is a]    Parents do some stinging for didn't his normal finding in the problem began taken Andres double Andres me know and no INR was given Zithromax begun happening POTASSIUM   --   3.8  3.8   < >  3.8   CR   --   0.70  0.53   < >  0.68    < > = values in this interval not displayed.        IMPRESSION:                                                    Reason for surgery/procedure: cholecystectomy  Diagnosis/reason for consult: preop    The proposed surgical procedure is considered INTERMEDIATE risk.    REVISED CARDIAC RISK INDEX  The patient has the following serious cardiovascular risks for perioperative complications such as (MI, PE, VFib and 3  AV Block):  No serious cardiac risks  INTERPRETATION: 0 risks: Class I (very low risk - 0.4% complication rate)    The patient has the following additional risks for perioperative complications:  No identified additional risks      ICD-10-CM    1. Preop general physical exam Z01.818    2. Biliary colic K80.50    3. Chronic right upper quadrant pain R10.11     G89.29    4. Anxiety F41.9 LORazepam (ATIVAN) 1 MG tablet   5. Opioid dependence in remission (H) F11.21 On 1/2 tablet of suboxone which is the lowest dose. Has been on this dose for 6 years. History of opioid dependence that started with treatment of migraines with opioids. Has been on suboxone for a total of 10 years.   6. Gastroesophageal reflux disease, esophagitis presence not specified K21.9 Taking omeprazole daily. Will take morning of surgery.   7.  Hypokalemia E87.6 Potassium   Date Value Ref Range Status   01/24/2018 3.8 3.4 - 5.3 mmol/L Final   History of hypokalemia. Stable at last check on 1/24/18.     8. Tobacco abuse Z72.0 Current smoker. Advised to stop smoking 24 hours before surgery.       RECOMMENDATIONS:                                                      --Consult hospital rounder / IM to assist post-op medical management    --Patient is to withhold all scheduled medications on the day of surgery EXCEPT for modifications listed below.    ACE Inhibitor or Angiotensin Receptor Blocker (ARB) Use  Propranolol 20 mg - will take this dose the morning of surgery    GERD treatment   Omeprazole will be taken morning of surgery    APPROVAL GIVEN to proceed with proposed procedure, without further diagnostic evaluation       Signed Electronically by: JEANNIE Cano CNP    Copy of this evaluation report is provided to requesting physician.    [facility] Preop Guidelines

## 2018-01-26 NOTE — LETTER
1/26/2018         RE: Radha Beckwith  [address] [place] [address]        Dear Colleague,    Thank you for referring your patient, Radha Beckwith, to the [facility], [place]. Please see a copy of my visit note below.    Patient seen in consultation for biliary colic by Luther Reyes MD    HPI:  Patient is a 42 year old female with several month history of biliary colic type symptoms. She has epigastric and RUQ pain after eating most times. Recently, she has had the pain in her upper abdomen almost constantly for 5 days. Interestingly, her US, HIDA and labs have all been normal. She did report increasing, intense pain with the CCK injection which was similar but more intense than her normal pain. She also reports nausea and some diarrhea. No emesis. No jaundice. She had a hysterectomy in October.    Review Of Systems    Skin: negative  Ears/Nose/Throat: negative  Respiratory: No shortness of breath, dyspnea on exertion, cough, or hemoptysis  Cardiovascular: negative  Gastrointestinal: as above  Genitourinary: negative  Musculoskeletal: negative  Neurologic: negative  Hematologic/Lymphatic/Immunologic: negative  Endocrine: negative      Past Medical History:   Diagnosis Date     Abdominal pain, right lower quadrant 03/09/08    Admit. Discharged 03/10/08     Anxiety attack 7/31/2015     Dehydration      Gastric ulcer 7/31/2015     GERD (gastroesophageal reflux disease) 7/28/2010    Takes omeprazole and metoclopramide      Opioid dependence in remission (H) 8/2/2015     Other and unspecified ovarian cyst      Papanicolaou smear of cervix with low grade squamous intraepithelial lesion (LGSIL) 07/07/2017       Past Surgical History:   Procedure Laterality Date     BIOPSY CERVICAL, LOCAL EXCISION, SINGLE/MULTIPLE N/A 8/10/2017    Procedure: BIOPSY CERVICAL, LOCAL EXCISION, SINGLE/MULTIPLE;;  Surgeon: Michael Chandler MD;  [facility]     COLPOSCOPY, BIOPSY, COMBINED N/A  8/10/2017    Procedure: COMBINED COLPOSCOPY, BIOPSY;  Colposcopy with Cervical Biopsies and Endometrial Biopsy, Exam with Ultrasound;  Surgeon: Michael Chandler MD;  [facility]     ESOPHAGOSCOPY, GASTROSCOPY, DUODENOSCOPY (EGD), COMBINED N/A 4/17/2017    Procedure: COMBINED ESOPHAGOSCOPY, GASTROSCOPY, DUODENOSCOPY (EGD);  Surgeon: Ibrahima Esposito MD;  [facility]     EXAM UNDER ANESTHESIA PELVIC N/A 8/10/2017    Procedure: EXAM UNDER ANESTHESIA PELVIC;;  Surgeon: Michael Chandler MD;  [facility]     HC UGI ENDOSCOPY, SIMPLE EXAM  01/07/08     HYSTERECTOMY       LAPAROSCOPIC HYSTERECTOMY TOTAL N/A 10/30/2017    Procedure: LAPAROSCOPIC HYSTERECTOMY TOTAL;  LAPAROSCOPIC HYSTERECTOMY TOTAL POSSIBLE SALPINGO-OOPHERECTOMY (BILATERAL);  Surgeon: Michael Chandler MD;  [facility]       Family History   Problem Relation Age of Onset     Depression Mother      Respiratory Mother      Chronic Obstructive Pulmonary Disease Mother      CEREBROVASCULAR DISEASE Father      Brain anyeurism     Adrenal Disorder Other      Chronic Obstructive Pulmonary Disease Other      Breast Cancer Cousin        Social History     Social History     Marital status: Single     Spouse name: N/A     Number of children: N/A     Years of education: N/A     Occupational History     Not on file.     Social History Main Topics     Smoking status: Current Every Day Smoker     Packs/day: 0.25     Years: 20.00     Types: Cigarettes     Smokeless tobacco: Never Used      Comment: 5-6 cigs daily     Alcohol use Yes      Comment: occasional drinks     Drug use: No     Sexual activity: No     Other Topics Concern     Parent/Sibling W/ Cabg, Mi Or Angioplasty Before 65f 55m? No     Social History Narrative       Current Outpatient Prescriptions   Medication Sig Dispense Refill     ACETAMINOPHEN PO Take 1,000 mg by mouth every 6 hours as needed for pain       HYDROcodone-acetaminophen (NORCO) 5-325 MG per tablet  Take 1-2 tablets by mouth every 6 hours as needed 6 tablet 0     gabapentin (NEURONTIN) 300 MG capsule Take 1 capsule (300 mg) by mouth 3 times daily 30 capsule 1     lidocaine (LIDODERM) 5 % Patch Apply up to 3 patches to painful area at once for up to 12 h within a 24 h period.  Remove after 12 hours. 30 patch 0     cetirizine (ZYRTEC) 10 MG tablet TAKE ONE TABLET BY MOUTH EVERY EVENING 30 tablet 3     LORazepam (ATIVAN) 1 MG tablet Take 0.5 tablets (0.5 mg) by mouth every 8 hours as needed for anxiety 20 tablet 0     omeprazole 20 MG tablet Take 1 tablet (20 mg) by mouth 2 times daily Take 30-60 minutes before a meal. 60 tablet 3     QUEtiapine (SEROQUEL) 200 MG tablet Take 1 tablet (200 mg) by mouth At Bedtime 90 tablet 1     sertraline (ZOLOFT) 100 MG tablet Take 2 tablets (200 mg) by mouth daily 180 tablet 1     senna-docusate (SENOKOT-S;PERICOLACE) 8.6-50 MG per tablet Take 1-2 tablets by mouth daily as needed for constipation 30 tablet 3     propranolol (INDERAL) 20 MG tablet Take 1 tablet (20 mg) by mouth 2 times daily 180 tablet 1     ondansetron (ZOFRAN) 4 MG tablet TAKE ONE TABLET BY MOUTH EVERY 6 HOURS AS NEEDED FOR NAUSEA OR VOMITING 18 tablet 10     order for DME Blood pressure cuff 1 each 0       Medications and history reviewed    Physical exam:  Vitals: Pulse 100  Temp 97.5  F (36.4  C) (Temporal)  Wt 73.3 kg (161 lb 8 oz)  LMP  (LMP Unknown)  SpO2 96%  BMI 26.88 kg/m2  BMI= Body mass index is 26.88 kg/(m^2).    Constitutional: Healthy, alert, non-distressed   Head: Normo-cephalic, atraumatic, no lesions, masses or tenderness   Cardiovascular: RRR, no new murmurs, +S1, +S2 heart sounds, no clicks, rubs or gallops   Respiratory: CTAB, no rales, rhonchi or wheezing, equal chest rise, good respiratory effort   Gastrointestinal: Soft, non-tender to palpation but pain at rest in the upper abdomen, non distended, no rebound rigidity or guarding, no masses or hernias palpated  :  Deferred  Musculoskeletal: Moves all extremities, normal  strength, no deformities noted   Skin: No suspicious lesions or rashes   Psychiatric: Mentation appears normal, affect appropriate   Hematologic/Lymphatic/Immunologic: Normal cervical and supraclavicular lymph nodes   Patient able to get up on table without difficulty.    Labs show:  Results for orders placed or performed during the hospital encounter of 01/25/18 (from the past 24 hour(s))   CBC with platelets differential   Result Value Ref Range    WBC 10.4 4.0 - 11.0 10e9/L    RBC Count 4.28 3.8 - 5.2 10e12/L    Hemoglobin 13.5 11.7 - 15.7 g/dL    Hematocrit 41.0 35.0 - 47.0 %    MCV 96 78 - 100 fl    MCH 31.5 26.5 - 33.0 pg    MCHC 32.9 31.5 - 36.5 g/dL    RDW 12.8 10.0 - 15.0 %    Platelet Count 190 150 - 450 10e9/L    Diff Method Automated Method     % Neutrophils 80.0 %    % Lymphocytes 13.2 %    % Monocytes 5.1 %    % Eosinophils 1.3 %    % Basophils 0.2 %    % Immature Granulocytes 0.2 %    Absolute Neutrophil 8.3 1.6 - 8.3 10e9/L    Absolute Lymphocytes 1.4 0.8 - 5.3 10e9/L    Absolute Monocytes 0.5 0.0 - 1.3 10e9/L    Absolute Eosinophils 0.1 0.0 - 0.7 10e9/L    Absolute Basophils 0.0 0.0 - 0.2 10e9/L    Abs Immature Granulocytes 0.0 0 - 0.4 10e9/L   Lipase   Result Value Ref Range    Lipase 307 73 - 393 U/L     Imaging:  Recent Results (from the past 744 hour(s))   NM Hepatobiliary Scan w GB EF    Narrative    NUCLEAR MEDICINE HEPATOBILIARY SCAN WITH GB EF January 24, 2018 12:57  PM     HISTORY: Abdominal pain, right upper quadrant; status post  hysterectomy.    TECHNIQUE: 5.3 mCi of technetium 99m labeled Mebrofenin were  intravenously given while dynamically imaging the right upper abdomen.  Approximately one hour later, 1.5 mcg of cholecystokinin (CCK) was  intravenously given over 12 minutes while evaluating the gallbladder  ejection fraction.      COMPARISON:   Nuclear Study: None.    Other Relevant Studies: Limited abdominal ultrasound  dated 12/22/2017,  CT abdomen and pelvis dated 12/21/2017.    FINDINGS: There is normal, homogeneous uptake of radiotracer in the  liver. The gallbladder is seen by the 20 minute image and the small  bowel is seen by the 35 minute image.    After the administration of CCK, a gallbladder ejection fraction of 67  was measured. The patient described 10/10 severity abdominal pain  (similar to the pretest symptoms) with cholecystokinin infusion.        Impression    IMPRESSION:  1. No evidence for common bile duct or cystic duct obstruction.  2. Normal gallbladder ejection fraction.  3. Pain described 10/10 severity pain (similar to the pretest  symptoms) with cholecystokinin infusion.    I discussed these findings with Dr. Luther Reyes on 1/24/2018 at  approximately 1:55 PM.    WILTON HARGROVE MD         Assessment:     ICD-10-CM    1. Biliary colic K80.50      Plan: I explained to Radha how she did have typical biliary colic type symptoms without any evidence of stones, biliary obstruction or dyskinesia. She does have post-prandial nausea, pain, diarrhea and of note her pain intensified with the CCK injection during her HIDA scan. With the constellation of symptoms I believe she may benefit from a laparoscopic cholecystectomy. I did tell her that because of the relatively normal evaluation of her GB that having the surgery does not guarantee her symptoms will resolve. I told her that she has ~70% chance this will help her so she has to be willing to undergo the risks of surgery for the potential benefit.    Discussed what to expect with surgery. Risks of bleeding, infection, anesthesia complications, conversion to open, injury to nearby structures and bile duct injury all discussed.   We went over my discharge instructions and post-op restrictions.  Patient questions answered and we will schedule the procedure.      Tigre Lowry, DO        Please schedule for surgery, pre op H&P, and post ops.    Surgery:  Patient  Name:  Radha Beckwith ([id number])  Procedure:   Laparoscopic cholecystectomy possible open  Diagnosis:  Biliary colic  Assistant: first assist  Surgeon:  Tigre Lowry DO  Anesthesia:  General  PT type:  Same Day Surgery  Time needed: 60 minutes  Patient position:  lying supine  Mini fluoro:  No  C-arm:  no  Equipment:  na  Anticoagulation:  No  Vendor:  no  Surgeon Notes: na    Post op appts:    12-15 days po    Expected work restrictions:  na    [facility] Discussed:  Not Applicable      Again, thank you for allowing me to participate in the care of your patient.        Sincerely,        Tigre Lowry DO

## 2018-01-26 NOTE — PROGRESS NOTES
Patient seen in consultation for biliary colic by Luther Reyes MD    HPI:  Patient is a 42 year old female with several month history of biliary colic type symptoms. She has epigastric and RUQ pain after eating most times. Recently, she has had the pain in her upper abdomen almost constantly for 5 days. Interestingly, her US, HIDA and labs have all been normal. She did report increasing, intense pain with the CCK injection which was similar but more intense than her normal pain. She also reports nausea and some diarrhea. No emesis. No jaundice. She had a hysterectomy in October.    Review Of Systems    Skin: negative  Ears/Nose/Throat: negative  Respiratory: No shortness of breath, dyspnea on exertion, cough, or hemoptysis  Cardiovascular: negative  Gastrointestinal: as above  Genitourinary: negative  Musculoskeletal: negative  Neurologic: negative  Hematologic/Lymphatic/Immunologic: negative  Endocrine: negative      Past Medical History:   Diagnosis Date     Abdominal pain, right lower quadrant 03/09/08    Admit. Discharged 03/10/08     Anxiety attack 7/31/2015     Dehydration      Gastric ulcer 7/31/2015     GERD (gastroesophageal reflux disease) 7/28/2010    Takes omeprazole and metoclopramide      Opioid dependence in remission (H) 8/2/2015     Other and unspecified ovarian cyst      Papanicolaou smear of cervix with low grade squamous intraepithelial lesion (LGSIL) 07/07/2017       Past Surgical History:   Procedure Laterality Date     BIOPSY CERVICAL, LOCAL EXCISION, SINGLE/MULTIPLE N/A 8/10/2017    Procedure: BIOPSY CERVICAL, LOCAL EXCISION, SINGLE/MULTIPLE;;  Surgeon: Michael Chandler MD;  [facility]     COLPOSCOPY, BIOPSY, COMBINED N/A 8/10/2017    Procedure: COMBINED COLPOSCOPY, BIOPSY;  Colposcopy with Cervical Biopsies and Endometrial Biopsy, Exam with Ultrasound;  Surgeon: Michael Chandler MD;  [facility]     ESOPHAGOSCOPY, GASTROSCOPY, DUODENOSCOPY (EGD), COMBINED N/A  4/17/2017    Procedure: COMBINED ESOPHAGOSCOPY, GASTROSCOPY, DUODENOSCOPY (EGD);  Surgeon: Ibrahima Esposito MD;  [facility]     EXAM UNDER ANESTHESIA PELVIC N/A 8/10/2017    Procedure: EXAM UNDER ANESTHESIA PELVIC;;  Surgeon: Michael Chandler MD;  [facility]     HC UGI ENDOSCOPY, SIMPLE EXAM  01/07/08     HYSTERECTOMY       LAPAROSCOPIC HYSTERECTOMY TOTAL N/A 10/30/2017    Procedure: LAPAROSCOPIC HYSTERECTOMY TOTAL;  LAPAROSCOPIC HYSTERECTOMY TOTAL POSSIBLE SALPINGO-OOPHERECTOMY (BILATERAL);  Surgeon: Michael Chandler MD;  [facility]       Family History   Problem Relation Age of Onset     Depression Mother      Respiratory Mother      Chronic Obstructive Pulmonary Disease Mother      CEREBROVASCULAR DISEASE Father      Brain anyeurism     Adrenal Disorder Other      Chronic Obstructive Pulmonary Disease Other      Breast Cancer Cousin        Social History     Social History     Marital status: Single     Spouse name: N/A     Number of children: N/A     Years of education: N/A     Occupational History     Not on file.     Social History Main Topics     Smoking status: Current Every Day Smoker     Packs/day: 0.25     Years: 20.00     Types: Cigarettes     Smokeless tobacco: Never Used      Comment: 5-6 cigs daily     Alcohol use Yes      Comment: occasional drinks     Drug use: No     Sexual activity: No     Other Topics Concern     Parent/Sibling W/ Cabg, Mi Or Angioplasty Before 65f 55m? No     Social History Narrative       Current Outpatient Prescriptions   Medication Sig Dispense Refill     ACETAMINOPHEN PO Take 1,000 mg by mouth every 6 hours as needed for pain       HYDROcodone-acetaminophen (NORCO) 5-325 MG per tablet Take 1-2 tablets by mouth every 6 hours as needed 6 tablet 0     gabapentin (NEURONTIN) 300 MG capsule Take 1 capsule (300 mg) by mouth 3 times daily 30 capsule 1     lidocaine (LIDODERM) 5 % Patch Apply up to 3 patches to painful area at once for up to 12 h  within a 24 h period.  Remove after 12 hours. 30 patch 0     cetirizine (ZYRTEC) 10 MG tablet TAKE ONE TABLET BY MOUTH EVERY EVENING 30 tablet 3     LORazepam (ATIVAN) 1 MG tablet Take 0.5 tablets (0.5 mg) by mouth every 8 hours as needed for anxiety 20 tablet 0     omeprazole 20 MG tablet Take 1 tablet (20 mg) by mouth 2 times daily Take 30-60 minutes before a meal. 60 tablet 3     QUEtiapine (SEROQUEL) 200 MG tablet Take 1 tablet (200 mg) by mouth At Bedtime 90 tablet 1     sertraline (ZOLOFT) 100 MG tablet Take 2 tablets (200 mg) by mouth daily 180 tablet 1     senna-docusate (SENOKOT-S;PERICOLACE) 8.6-50 MG per tablet Take 1-2 tablets by mouth daily as needed for constipation 30 tablet 3     propranolol (INDERAL) 20 MG tablet Take 1 tablet (20 mg) by mouth 2 times daily 180 tablet 1     ondansetron (ZOFRAN) 4 MG tablet TAKE ONE TABLET BY MOUTH EVERY 6 HOURS AS NEEDED FOR NAUSEA OR VOMITING 18 tablet 10     order for DME Blood pressure cuff 1 each 0       Medications and history reviewed    Physical exam:  Vitals: Pulse 100  Temp 97.5  F (36.4  C) (Temporal)  Wt 73.3 kg (161 lb 8 oz)  LMP  (LMP Unknown)  SpO2 96%  BMI 26.88 kg/m2  BMI= Body mass index is 26.88 kg/(m^2).    Constitutional: Healthy, alert, non-distressed   Head: Normo-cephalic, atraumatic, no lesions, masses or tenderness   Cardiovascular: RRR, no new murmurs, +S1, +S2 heart sounds, no clicks, rubs or gallops   Respiratory: CTAB, no rales, rhonchi or wheezing, equal chest rise, good respiratory effort   Gastrointestinal: Soft, non-tender to palpation but pain at rest in the upper abdomen, non distended, no rebound rigidity or guarding, no masses or hernias palpated  : Deferred  Musculoskeletal: Moves all extremities, normal  strength, no deformities noted   Skin: No suspicious lesions or rashes   Psychiatric: Mentation appears normal, affect appropriate   Hematologic/Lymphatic/Immunologic: Normal cervical and supraclavicular lymph nodes    Patient able to get up on table without difficulty.    Labs show:  Results for orders placed or performed during the hospital encounter of 01/25/18 (from the past 24 hour(s))   CBC with platelets differential   Result Value Ref Range    WBC 10.4 4.0 - 11.0 10e9/L    RBC Count 4.28 3.8 - 5.2 10e12/L    Hemoglobin 13.5 11.7 - 15.7 g/dL    Hematocrit 41.0 35.0 - 47.0 %    MCV 96 78 - 100 fl    MCH 31.5 26.5 - 33.0 pg    MCHC 32.9 31.5 - 36.5 g/dL    RDW 12.8 10.0 - 15.0 %    Platelet Count 190 150 - 450 10e9/L    Diff Method Automated Method     % Neutrophils 80.0 %    % Lymphocytes 13.2 %    % Monocytes 5.1 %    % Eosinophils 1.3 %    % Basophils 0.2 %    % Immature Granulocytes 0.2 %    Absolute Neutrophil 8.3 1.6 - 8.3 10e9/L    Absolute Lymphocytes 1.4 0.8 - 5.3 10e9/L    Absolute Monocytes 0.5 0.0 - 1.3 10e9/L    Absolute Eosinophils 0.1 0.0 - 0.7 10e9/L    Absolute Basophils 0.0 0.0 - 0.2 10e9/L    Abs Immature Granulocytes 0.0 0 - 0.4 10e9/L   Lipase   Result Value Ref Range    Lipase 307 73 - 393 U/L     Imaging:  Recent Results (from the past 744 hour(s))   NM Hepatobiliary Scan w GB EF    Narrative    NUCLEAR MEDICINE HEPATOBILIARY SCAN WITH GB EF January 24, 2018 12:57  PM     HISTORY: Abdominal pain, right upper quadrant; status post  hysterectomy.    TECHNIQUE: 5.3 mCi of technetium 99m labeled Mebrofenin were  intravenously given while dynamically imaging the right upper abdomen.  Approximately one hour later, 1.5 mcg of cholecystokinin (CCK) was  intravenously given over 12 minutes while evaluating the gallbladder  ejection fraction.      COMPARISON:   Nuclear Study: None.    Other Relevant Studies: Limited abdominal ultrasound dated 12/22/2017,  CT abdomen and pelvis dated 12/21/2017.    FINDINGS: There is normal, homogeneous uptake of radiotracer in the  liver. The gallbladder is seen by the 20 minute image and the small  bowel is seen by the 35 minute image.    After the administration of CCK, a  gallbladder ejection fraction of 67  was measured. The patient described 10/10 severity abdominal pain  (similar to the pretest symptoms) with cholecystokinin infusion.        Impression    IMPRESSION:  1. No evidence for common bile duct or cystic duct obstruction.  2. Normal gallbladder ejection fraction.  3. Pain described 10/10 severity pain (similar to the pretest  symptoms) with cholecystokinin infusion.    I discussed these findings with Dr. Luther Reyes on 1/24/2018 at  approximately 1:55 PM.    WILTON HARGROVE MD         Assessment:     ICD-10-CM    1. Biliary colic K80.50      Plan: I explained to Radha how she did have typical biliary colic type symptoms without any evidence of stones, biliary obstruction or dyskinesia. She does have post-prandial nausea, pain, diarrhea and of note her pain intensified with the CCK injection during her HIDA scan. With the constellation of symptoms I believe she may benefit from a laparoscopic cholecystectomy. I did tell her that because of the relatively normal evaluation of her GB that having the surgery does not guarantee her symptoms will resolve. I told her that she has ~70% chance this will help her so she has to be willing to undergo the risks of surgery for the potential benefit.    Discussed what to expect with surgery. Risks of bleeding, infection, anesthesia complications, conversion to open, injury to nearby structures and bile duct injury all discussed.   We went over my discharge instructions and post-op restrictions.  Patient questions answered and we will schedule the procedure.      Tigre Lowry DO        Please schedule for surgery, pre op H&P, and post ops.    Surgery:  Patient Name:  Radha Beckwith ([id number])  Procedure:   Laparoscopic cholecystectomy possible open  Diagnosis:  Biliary colic  Assistant: first assist  Surgeon:  Tigre Lowry DO  Anesthesia:  General  PT type:  Same Day Surgery  Time needed: 60 minutes  Patient  position:  lying supine  Mini fluoro:  No  C-arm:  no  Equipment:  na  Anticoagulation:  No  Vendor:  no  Surgeon Notes: na    Post op appts:    12-15 days po    Expected work restrictions:  na    [facility] Discussed:  Not Applicable

## 2018-01-26 NOTE — PATIENT INSTRUCTIONS
Hold all medications EXCEPT propranolol the morning of surgery. Okay to take other medications when you are back home after surgery.    Stop smoking 24 hours prior to surgery.    MICHAEL Paz    Before Your Surgery      Call your surgeon if there is any change in your health. This includes signs of a cold or flu (such as a sore throat, runny nose, cough, rash or fever).    Do not smoke, drink alcohol or take over the counter medicine (unless your surgeon or primary care doctor tells you to) for the 24 hours before and after surgery.    If you take prescribed drugs: Follow your doctor s orders about which medicines to take and which to stop until after surgery.    Eating and drinking prior to surgery: follow the instructions from your surgeon    Take a shower or bath the night before surgery. Use the soap your surgeon gave you to gently clean your skin. If you do not have soap from your surgeon, use your regular soap. Do not shave or scrub the surgery site.  Wear clean pajamas and have clean sheets on your bed.

## 2018-01-31 ENCOUNTER — ALLIED HEALTH/NURSE VISIT (OUTPATIENT)
Dept: FAMILY MEDICINE | Facility: OTHER | Age: 43
End: 2018-01-31
Payer: COMMERCIAL

## 2018-01-31 VITALS
WEIGHT: 163.5 LBS | TEMPERATURE: 98 F | RESPIRATION RATE: 20 BRPM | BODY MASS INDEX: 27.24 KG/M2 | HEIGHT: 65 IN | SYSTOLIC BLOOD PRESSURE: 116 MMHG | DIASTOLIC BLOOD PRESSURE: 70 MMHG | HEART RATE: 80 BPM

## 2018-01-31 DIAGNOSIS — F11.21 OPIOID DEPENDENCE IN REMISSION (H): ICD-10-CM

## 2018-01-31 DIAGNOSIS — K80.50 BILIARY COLIC: ICD-10-CM

## 2018-01-31 DIAGNOSIS — F41.9 ANXIETY: ICD-10-CM

## 2018-01-31 DIAGNOSIS — K21.9 GASTROESOPHAGEAL REFLUX DISEASE, ESOPHAGITIS PRESENCE NOT SPECIFIED: ICD-10-CM

## 2018-01-31 DIAGNOSIS — Z01.818 PREOP GENERAL PHYSICAL EXAM: Primary | ICD-10-CM

## 2018-01-31 DIAGNOSIS — R10.11 CHRONIC RIGHT UPPER QUADRANT PAIN: ICD-10-CM

## 2018-01-31 DIAGNOSIS — Z72.0 TOBACCO ABUSE: ICD-10-CM

## 2018-01-31 DIAGNOSIS — G89.29 CHRONIC RIGHT UPPER QUADRANT PAIN: ICD-10-CM

## 2018-01-31 DIAGNOSIS — E87.6 HYPOKALEMIA: ICD-10-CM

## 2018-01-31 PROCEDURE — 99214 OFFICE O/P EST MOD 30 MIN: CPT | Performed by: STUDENT IN AN ORGANIZED HEALTH CARE EDUCATION/TRAINING PROGRAM

## 2018-01-31 RX ORDER — LORAZEPAM 1 MG/1
1 TABLET ORAL DAILY PRN
Qty: 20 TABLET | Refills: 0 | Status: SHIPPED | OUTPATIENT
Start: 2018-01-31 | End: 2018-03-06

## 2018-01-31 ASSESSMENT — ANXIETY QUESTIONNAIRES
GAD7 TOTAL SCORE: 14
6. BECOMING EASILY ANNOYED OR IRRITABLE: NEARLY EVERY DAY
5. BEING SO RESTLESS THAT IT IS HARD TO SIT STILL: NOT AT ALL
3. WORRYING TOO MUCH ABOUT DIFFERENT THINGS: MORE THAN HALF THE DAYS
GAD7 TOTAL SCORE: 14
GAD7 TOTAL SCORE: 14
7. FEELING AFRAID AS IF SOMETHING AWFUL MIGHT HAPPEN: NEARLY EVERY DAY
4. TROUBLE RELAXING: SEVERAL DAYS
7. FEELING AFRAID AS IF SOMETHING AWFUL MIGHT HAPPEN: NEARLY EVERY DAY
2. NOT BEING ABLE TO STOP OR CONTROL WORRYING: MORE THAN HALF THE DAYS
1. FEELING NERVOUS, ANXIOUS, OR ON EDGE: NEARLY EVERY DAY

## 2018-01-31 ASSESSMENT — PATIENT HEALTH QUESTIONNAIRE - PHQ9
SUM OF ALL RESPONSES TO PHQ QUESTIONS 1-9: 13
SUM OF ALL RESPONSES TO PHQ QUESTIONS 1-9: 13
10. IF YOU CHECKED OFF ANY PROBLEMS, HOW DIFFICULT HAVE THESE PROBLEMS MADE IT FOR YOU TO DO YOUR WORK, TAKE CARE OF THINGS AT HOME, OR GET ALONG WITH OTHER PEOPLE: VERY DIFFICULT

## 2018-01-31 ASSESSMENT — PAIN SCALES - GENERAL: PAINLEVEL: SEVERE PAIN (6)

## 2018-01-31 NOTE — PROGRESS NOTES
"  SUBJECTIVE:   Radha Beckwith is a 42 year old female who presents to clinic today for the following health issues:      History of Present Illness     Depression & Anxiety Follow-up:     Depression/Anxiety:  Anxiety only    Status since last visit::  Stable (\"Same\")    Other associated symptoms of anxiety::  None    Significant life event::  No    Current substance use::  None    Depression symptoms::  YES       Today's PHQ-9         PHQ-9 Total Score:     (P) 13   PHQ-9 Q9 Suicidal ideation:   (P) Not at all   Thoughts of suicide or self harm:      Self-harm Plan:        Self-harm Action:          Safety concerns for self or others:       BO-7 Total Score: (P) 14    Diet:  Regular (no restrictions)  Frequency of exercise:  None  Taking medications regularly:  Yes  Medication side effects:  Not applicable  Additional concerns today:  YES    Answers for HPI/ROS submitted by the patient on 1/31/2018   PHQ-2 Score: 5  If you checked off any problems, how difficult have these problems made it for you to do your work, take care of things at home, or get along with other people?: Very difficult  PHQ9 TOTAL SCORE: 13  BO 7 TOTAL SCORE: 14      Problem list and histories reviewed & adjusted, as indicated.  Additional history: as documented    Recent Labs   Lab Test  01/24/18   1404  12/22/17   1545  12/21/17   1214   08/10/17   1228   LDL   --    --    --    --   Cannot estimate LDL when triglyceride exceeds 400 mg/dL  164*   HDL   --    --    --    --   30*   TRIG   --    --    --    --   608*   ALT  62*  28  31   --   35   CR  0.70  0.53  0.65   < >  0.67   GFRESTIMATED  >90  >90  >90   < >  >90  Non  GFR Calc     GFRESTBLACK  >90  >90  >90   < >  >90   GFR Calc     POTASSIUM  3.8  3.8  3.8   --   4.1   TSH   --    --    --    --   1.01    < > = values in this interval not displayed.      BP Readings from Last 3 Encounters:   01/31/18 116/70   01/25/18 120/70   01/24/18 124/68    " "Wt Readings from Last 3 Encounters:   01/31/18 163 lb 8 oz (74.2 kg)   01/26/18 161 lb 8 oz (73.3 kg)   01/25/18 161 lb (73 kg)                  Labs reviewed in EPIC    ROS:  Constitutional, HEENT, cardiovascular, pulmonary, GI, , musculoskeletal, neuro, skin, endocrine and psych systems are negative, except as otherwise noted.    OBJECTIVE:     /70 (BP Location: Right arm, Patient Position: Sitting, Cuff Size: Adult Regular)  Pulse 80  Temp 98  F (36.7  C) (Temporal)  Resp 20  Ht 5' 5\" (1.651 m)  Wt 163 lb 8 oz (74.2 kg)  LMP  (LMP Unknown)  BMI 27.21 kg/m2  Body mass index is 27.21 kg/(m^2).  PSYCH: mentation appears normal, affect flat, patient appearance--well-groomed, anxious    Diagnostic Test Results:  none     ASSESSMENT/PLAN:     +++see preop notes for diagnoses #1-3    4. Anxiety  Discussed with patient concern for long term use of benzodiazepines for treatment of anxiety and that I would like to see her using these sparingly. #20 should last one month or longer. Recommended counseling but patient declined stating she does not have time. She is working and taking care of her mother whose health is declining. Continue Zoloft and Seroquel. Follow up in one month or when refills of lorazepam are needed.  - LORazepam (ATIVAN) 1 MG tablet; Take 1 tablet (1 mg) by mouth daily as needed for anxiety (or panic attack. Use sparingly. Do not take before driving.)  Dispense: 20 tablet; Refill: 0    JEANNIE Cano [facility]  "

## 2018-01-31 NOTE — MR AVS SNAPSHOT
After Visit Summary   1/31/2018    Radha Beckwith    MRN: [id number]           Patient Information     Date Of Birth          1975        Visit Information        Provider Department      1/31/2018 10:00 AM Marisa Story APRN [facility]'s Diagnoses     Preop general physical exam    -  1    Anxiety          Care Instructions    Hold all medications EXCEPT propranolol the morning of surgery. Okay to take other medications when you are back home after surgery.    Stop smoking 24 hours prior to surgery.    MICHAEL Paz    Before Your Surgery      Call your surgeon if there is any change in your health. This includes signs of a cold or flu (such as a sore throat, runny nose, cough, rash or fever).    Do not smoke, drink alcohol or take over the counter medicine (unless your surgeon or primary care doctor tells you to) for the 24 hours before and after surgery.    If you take prescribed drugs: Follow your doctor s orders about which medicines to take and which to stop until after surgery.    Eating and drinking prior to surgery: follow the instructions from your surgeon    Take a shower or bath the night before surgery. Use the soap your surgeon gave you to gently clean your skin. If you do not have soap from your surgeon, use your regular soap. Do not shave or scrub the surgery site.  Wear clean pajamas and have clean sheets on your bed.           Follow-ups after your visit        Your next 10 appointments already scheduled     Feb 02, 2018   Procedure with Tigre Lowry DO   [facility] ([facility])    [address] [place] [address]   [phone]           From [address]: Exit at [address] on [address] side of [place]. Turn right on [address]. Turn left at stoplight on [address]. [facility] will be in view two blocks ahead            Feb 16, 2018 10:00 AM  "CST   Return Visit with Tigre Lowry, DO   [facility]    [address] [address] [phone]              Who to contact     If you have questions or need follow up information about today's clinic visit or your schedule please contact [facility] directly at [phone].  Normal or non-critical lab and imaging results will be communicated to you by MyChart, letter or phone within 4 business days after the clinic has received the results. If you do not hear from us within 7 days, please contact the clinic through [employer] or phone. If you have a critical or abnormal lab result, we will notify you by phone as soon as possible.  Submit refill requests through [email or web address] or call your pharmacy and they will forward the refill request to us. Please allow 3 business days for your refill to be completed.          Additional Information About Your Visit        [employer] Information     [email or web address] lets you send messages to your doctor, view your test results, renew your prescriptions, schedule appointments and more. To sign up, go to [email or web address] . Click on \"Log in\" on the left side of the screen, which will take you to the Welcome page. Then click on \"Sign up Now\" on the right side of the page.     You will be asked to enter the access code listed below, as well as some personal information. Please follow the directions to create your username and password.     Your access code is: [email or web address]  Expires: 3/29/2018  3:51 PM     Your access code will  in 90 days. If you need help or a new code, please call your [facility] or [phone].        Care EveryWhere ID     This is your Care EveryWhere ID. This could be used by other organizations to access your [place] medical records  [id number]        Your Vitals Were     Pulse Temperature Respirations Height Last Period BMI (Body Mass Index)    80 98  F (36.7  C) " "(Temporal) 20 5' 5\" (1.651 m) (LMP Unknown) 27.21 kg/m2       Blood Pressure from Last 3 Encounters:   01/31/18 116/70   01/25/18 120/70   01/24/18 124/68    Weight from Last 3 Encounters:   01/31/18 163 lb 8 oz (74.2 kg)   01/26/18 161 lb 8 oz (73.3 kg)   01/25/18 161 lb (73 kg)              Today, you had the following     No orders found for display         Today's Medication Changes          These changes are accurate as of 1/31/18 10:41 AM.  If you have any questions, ask your nurse or doctor.               These medicines have changed or have updated prescriptions.        Dose/Directions    LORazepam 1 MG tablet   Commonly known as:  ATIVAN   This may have changed:    - how much to take  - when to take this  - reasons to take this   Used for:  Anxiety   Changed by:  Marisa Story, JEANNIE CNP        Dose:  1 mg   Take 1 tablet (1 mg) by mouth daily as needed for anxiety (or panic attack. Use sparingly. Do not take before driving.)   Quantity:  20 tablet   Refills:  0            Where to get your medicines      Some of these will need a paper prescription and others can be bought over the counter.  Ask your nurse if you have questions.     Bring a paper prescription for each of these medications     LORazepam 1 MG tablet                Primary Care Provider Office Phone # Fax #    [facility] [phone] [address] [facility] [address]        Equal Access to Services     DIONNE GALLARDO AH: Calderon gayo Socherelle, waaxda luqadaha, qaybta kaalmada adeegyada, waxay ryder campo adebenjamin luong. So [facility] [phone].    ATENCIÓN: Si habla español, tiene a rueda disposición servicios gratuitos de asistencia lingüística. Llame al [phone].    We comply with applicable federal civil rights laws and [place] laws. We do not discriminate on the basis of race, color, national origin, age, disability, sex, sexual orientation, or gender identity.          "   Thank you!     Thank you for choosing [facility]  for your care. Our goal is always to provide you with excellent care. Hearing back from our patients is one way we can continue to improve our services. Please take a few minutes to complete the written survey that you may receive in the mail after your visit with us. Thank you!             Your Updated Medication List - Protect others around you: Learn how to safely use, store and throw away your medicines at www.disposemymeds.org.          This list is accurate as of 1/31/18 10:41 AM.  Always use your most recent med list.                   Brand Name Dispense Instructions for use Diagnosis    ACETAMINOPHEN PO      Take 1,000 mg by mouth every 6 hours as needed for pain        cetirizine 10 MG tablet    zyrTEC    30 tablet    TAKE ONE TABLET BY MOUTH EVERY EVENING    Seasonal allergic rhinitis, unspecified chronicity, unspecified trigger       gabapentin 300 MG capsule    NEURONTIN    30 capsule    Take 1 capsule (300 mg) by mouth 3 times daily        HYDROcodone-acetaminophen 5-325 MG per tablet    NORCO    6 tablet    Take 1-2 tablets by mouth every 6 hours as needed        lidocaine 5 % Patch    LIDODERM    30 patch    Apply up to 3 patches to painful area at once for up to 12 h within a 24 h period.  Remove after 12 hours.        LORazepam 1 MG tablet    ATIVAN    20 tablet    Take 1 tablet (1 mg) by mouth daily as needed for anxiety (or panic attack. Use sparingly. Do not take before driving.)    Anxiety       omeprazole 20 MG tablet     60 tablet    Take 1 tablet (20 mg) by mouth 2 times daily Take 30-60 minutes before a meal.    Gastroesophageal reflux disease, esophagitis presence not specified       ondansetron 4 MG tablet    ZOFRAN    18 tablet    TAKE ONE TABLET BY MOUTH EVERY 6 HOURS AS NEEDED FOR NAUSEA OR VOMITING    Anxiety attack       order for DME     1 each    Blood pressure cuff    Elevated blood pressure reading without  diagnosis of hypertension       propranolol 20 MG tablet    INDERAL    180 tablet    Take 1 tablet (20 mg) by mouth 2 times daily    Sinus tachycardia, Anxiety       QUEtiapine 200 MG tablet    SEROquel    90 tablet    Take 1 tablet (200 mg) by mouth At Bedtime    Anxiety, Psychophysiological insomnia       senna-docusate 8.6-50 MG per tablet    SENOKOT-S;PERICOLACE    30 tablet    Take 1-2 tablets by mouth daily as needed for constipation    S/P hysterectomy       sertraline 100 MG tablet    ZOLOFT    180 tablet    Take 2 tablets (200 mg) by mouth daily    Anxiety

## 2018-02-01 ASSESSMENT — PATIENT HEALTH QUESTIONNAIRE - PHQ9: SUM OF ALL RESPONSES TO PHQ QUESTIONS 1-9: 13

## 2018-02-01 ASSESSMENT — ANXIETY QUESTIONNAIRES: GAD7 TOTAL SCORE: 14

## 2018-02-02 ENCOUNTER — ANESTHESIA (OUTPATIENT)
Dept: SURGERY | Facility: CLINIC | Age: 43
End: 2018-02-02
Payer: COMMERCIAL

## 2018-02-02 ENCOUNTER — ANESTHESIA EVENT (OUTPATIENT)
Dept: SURGERY | Facility: CLINIC | Age: 43
End: 2018-02-02
Payer: COMMERCIAL

## 2018-02-02 ENCOUNTER — HOSPITAL ENCOUNTER (OUTPATIENT)
Facility: CLINIC | Age: 43
Discharge: HOME | End: 2018-02-02
Attending: SURGERY | Admitting: SURGERY
Payer: COMMERCIAL

## 2018-02-02 VITALS
SYSTOLIC BLOOD PRESSURE: 113 MMHG | RESPIRATION RATE: 14 BRPM | OXYGEN SATURATION: 94 % | TEMPERATURE: 97.3 F | DIASTOLIC BLOOD PRESSURE: 67 MMHG

## 2018-02-02 DIAGNOSIS — Z90.49 S/P LAPAROSCOPIC CHOLECYSTECTOMY: Primary | ICD-10-CM

## 2018-02-02 PROCEDURE — 37000008 ZZH ANESTHESIA TECHNICAL FEE, 1ST 30 MIN: Performed by: SURGERY

## 2018-02-02 PROCEDURE — 25000132 ZZH RX MED GY IP 250 OP 250 PS 637: Performed by: SURGERY

## 2018-02-02 PROCEDURE — 27210794 ZZH OR GENERAL SUPPLY STERILE: Performed by: SURGERY

## 2018-02-02 PROCEDURE — 25000128 H RX IP 250 OP 636: Performed by: SURGERY

## 2018-02-02 PROCEDURE — 88304 TISSUE EXAM BY PATHOLOGIST: CPT | Performed by: SURGERY

## 2018-02-02 PROCEDURE — 25000125 ZZHC RX 250: Performed by: NURSE ANESTHETIST, CERTIFIED REGISTERED

## 2018-02-02 PROCEDURE — 37000009 ZZH ANESTHESIA TECHNICAL FEE, EACH ADDTL 15 MIN: Performed by: SURGERY

## 2018-02-02 PROCEDURE — 25000128 H RX IP 250 OP 636: Performed by: NURSE ANESTHETIST, CERTIFIED REGISTERED

## 2018-02-02 PROCEDURE — 40000306 ZZH STATISTIC PRE PROC ASSESS II: Performed by: SURGERY

## 2018-02-02 PROCEDURE — 47562 LAPAROSCOPIC CHOLECYSTECTOMY: CPT | Performed by: SURGERY

## 2018-02-02 PROCEDURE — 25000125 ZZHC RX 250: Performed by: SURGERY

## 2018-02-02 PROCEDURE — 88304 TISSUE EXAM BY PATHOLOGIST: CPT | Mod: 26 | Performed by: SURGERY

## 2018-02-02 PROCEDURE — 25000566 ZZH SEVOFLURANE, EA 15 MIN: Performed by: SURGERY

## 2018-02-02 PROCEDURE — 71000014 ZZH RECOVERY PHASE 1 LEVEL 2 FIRST HR: Performed by: SURGERY

## 2018-02-02 PROCEDURE — 71000027 ZZH RECOVERY PHASE 2 EACH 15 MINS: Performed by: SURGERY

## 2018-02-02 PROCEDURE — 36000056 ZZH SURGERY LEVEL 3 1ST 30 MIN: Performed by: SURGERY

## 2018-02-02 PROCEDURE — 36000058 ZZH SURGERY LEVEL 3 EA 15 ADDTL MIN: Performed by: SURGERY

## 2018-02-02 PROCEDURE — 71000015 ZZH RECOVERY PHASE 1 LEVEL 2 EA ADDTL HR: Performed by: SURGERY

## 2018-02-02 PROCEDURE — 27110028 ZZH OR GENERAL SUPPLY NON-STERILE: Performed by: SURGERY

## 2018-02-02 RX ORDER — NEOSTIGMINE METHYLSULFATE 1 MG/ML
VIAL (ML) INJECTION PRN
Status: DISCONTINUED | OUTPATIENT
Start: 2018-02-02 | End: 2018-02-02

## 2018-02-02 RX ORDER — LIDOCAINE HYDROCHLORIDE 20 MG/ML
INJECTION, SOLUTION INFILTRATION; PERINEURAL PRN
Status: DISCONTINUED | OUTPATIENT
Start: 2018-02-02 | End: 2018-02-02

## 2018-02-02 RX ORDER — ACETAMINOPHEN 10 MG/ML
INJECTION, SOLUTION INTRAVENOUS PRN
Status: DISCONTINUED | OUTPATIENT
Start: 2018-02-02 | End: 2018-02-02

## 2018-02-02 RX ORDER — OXYCODONE HYDROCHLORIDE 5 MG/1
10 TABLET ORAL EVERY 4 HOURS PRN
Status: DISCONTINUED | OUTPATIENT
Start: 2018-02-02 | End: 2018-02-02 | Stop reason: HOSPADM

## 2018-02-02 RX ORDER — SODIUM CHLORIDE, SODIUM LACTATE, POTASSIUM CHLORIDE, CALCIUM CHLORIDE 600; 310; 30; 20 MG/100ML; MG/100ML; MG/100ML; MG/100ML
INJECTION, SOLUTION INTRAVENOUS CONTINUOUS
Status: DISCONTINUED | OUTPATIENT
Start: 2018-02-02 | End: 2018-02-02 | Stop reason: HOSPADM

## 2018-02-02 RX ORDER — FENTANYL CITRATE 50 UG/ML
INJECTION, SOLUTION INTRAMUSCULAR; INTRAVENOUS PRN
Status: DISCONTINUED | OUTPATIENT
Start: 2018-02-02 | End: 2018-02-02

## 2018-02-02 RX ORDER — PROPOFOL 10 MG/ML
INJECTION, EMULSION INTRAVENOUS PRN
Status: DISCONTINUED | OUTPATIENT
Start: 2018-02-02 | End: 2018-02-02

## 2018-02-02 RX ORDER — OXYCODONE HYDROCHLORIDE 5 MG/1
10 TABLET ORAL EVERY 4 HOURS PRN
Status: DISCONTINUED | OUTPATIENT
Start: 2018-02-02 | End: 2018-02-02

## 2018-02-02 RX ORDER — ONDANSETRON 4 MG/1
4 TABLET, ORALLY DISINTEGRATING ORAL EVERY 30 MIN PRN
Status: DISCONTINUED | OUTPATIENT
Start: 2018-02-02 | End: 2018-02-02 | Stop reason: HOSPADM

## 2018-02-02 RX ORDER — GLYCOPYRROLATE 0.2 MG/ML
INJECTION, SOLUTION INTRAMUSCULAR; INTRAVENOUS PRN
Status: DISCONTINUED | OUTPATIENT
Start: 2018-02-02 | End: 2018-02-02

## 2018-02-02 RX ORDER — MEPERIDINE HYDROCHLORIDE 25 MG/ML
12.5 INJECTION INTRAMUSCULAR; INTRAVENOUS; SUBCUTANEOUS
Status: COMPLETED | OUTPATIENT
Start: 2018-02-02 | End: 2018-02-02

## 2018-02-02 RX ORDER — CEFAZOLIN SODIUM 1 G/3ML
1 INJECTION, POWDER, FOR SOLUTION INTRAMUSCULAR; INTRAVENOUS SEE ADMIN INSTRUCTIONS
Status: DISCONTINUED | OUTPATIENT
Start: 2018-02-02 | End: 2018-02-02 | Stop reason: HOSPADM

## 2018-02-02 RX ORDER — CEFAZOLIN SODIUM 2 G/100ML
2 INJECTION, SOLUTION INTRAVENOUS
Status: COMPLETED | OUTPATIENT
Start: 2018-02-02 | End: 2018-02-02

## 2018-02-02 RX ORDER — DEXAMETHASONE SODIUM PHOSPHATE 10 MG/ML
INJECTION, SOLUTION INTRAMUSCULAR; INTRAVENOUS PRN
Status: DISCONTINUED | OUTPATIENT
Start: 2018-02-02 | End: 2018-02-02

## 2018-02-02 RX ORDER — ACETAMINOPHEN 325 MG/1
650 TABLET ORAL
Status: DISCONTINUED | OUTPATIENT
Start: 2018-02-02 | End: 2018-02-02 | Stop reason: HOSPADM

## 2018-02-02 RX ORDER — FENTANYL CITRATE 50 UG/ML
25-50 INJECTION, SOLUTION INTRAMUSCULAR; INTRAVENOUS
Status: DISCONTINUED | OUTPATIENT
Start: 2018-02-02 | End: 2018-02-02 | Stop reason: HOSPADM

## 2018-02-02 RX ORDER — NALOXONE HYDROCHLORIDE 0.4 MG/ML
.1-.4 INJECTION, SOLUTION INTRAMUSCULAR; INTRAVENOUS; SUBCUTANEOUS
Status: DISCONTINUED | OUTPATIENT
Start: 2018-02-02 | End: 2018-02-02 | Stop reason: HOSPADM

## 2018-02-02 RX ORDER — BUPRENORPHINE HYDROCHLORIDE AND NALOXONE HYDROCHLORIDE DIHYDRATE 2; .5 MG/1; MG/1
0.5 TABLET SUBLINGUAL DAILY
Refills: 0 | COMMUNITY
Start: 2018-02-02 | End: 2020-06-16

## 2018-02-02 RX ORDER — ONDANSETRON 2 MG/ML
4 INJECTION INTRAMUSCULAR; INTRAVENOUS EVERY 30 MIN PRN
Status: DISCONTINUED | OUTPATIENT
Start: 2018-02-02 | End: 2018-02-02 | Stop reason: HOSPADM

## 2018-02-02 RX ORDER — ONDANSETRON 2 MG/ML
INJECTION INTRAMUSCULAR; INTRAVENOUS PRN
Status: DISCONTINUED | OUTPATIENT
Start: 2018-02-02 | End: 2018-02-02

## 2018-02-02 RX ORDER — BUPIVACAINE HYDROCHLORIDE AND EPINEPHRINE 2.5; 5 MG/ML; UG/ML
INJECTION, SOLUTION INFILTRATION; PERINEURAL PRN
Status: DISCONTINUED | OUTPATIENT
Start: 2018-02-02 | End: 2018-02-02 | Stop reason: HOSPADM

## 2018-02-02 RX ORDER — OXYCODONE AND ACETAMINOPHEN 5; 325 MG/1; MG/1
1-2 TABLET ORAL EVERY 4 HOURS PRN
Qty: 18 TABLET | Refills: 0 | Status: SHIPPED | OUTPATIENT
Start: 2018-02-02 | End: 2018-02-05

## 2018-02-02 RX ORDER — OXYCODONE AND ACETAMINOPHEN 5; 325 MG/1; MG/1
1 TABLET ORAL
Status: DISCONTINUED | OUTPATIENT
Start: 2018-02-02 | End: 2018-02-02 | Stop reason: HOSPADM

## 2018-02-02 RX ORDER — HYDROMORPHONE HYDROCHLORIDE 1 MG/ML
.3-.5 INJECTION, SOLUTION INTRAMUSCULAR; INTRAVENOUS; SUBCUTANEOUS EVERY 10 MIN PRN
Status: DISCONTINUED | OUTPATIENT
Start: 2018-02-02 | End: 2018-02-02 | Stop reason: HOSPADM

## 2018-02-02 RX ADMIN — DEXAMETHASONE SODIUM PHOSPHATE 10 MG: 10 INJECTION, SOLUTION INTRAMUSCULAR; INTRAVENOUS at 13:47

## 2018-02-02 RX ADMIN — MEPERIDINE HYDROCHLORIDE 12.5 MG: 25 INJECTION, SOLUTION INTRAMUSCULAR; INTRAVENOUS; SUBCUTANEOUS at 16:03

## 2018-02-02 RX ADMIN — ACETAMINOPHEN 1000 MG: 10 INJECTION, SOLUTION INTRAVENOUS at 13:45

## 2018-02-02 RX ADMIN — FENTANYL CITRATE 100 MCG: 50 INJECTION, SOLUTION INTRAMUSCULAR; INTRAVENOUS at 13:36

## 2018-02-02 RX ADMIN — MEPERIDINE HYDROCHLORIDE 12.5 MG: 25 INJECTION, SOLUTION INTRAMUSCULAR; INTRAVENOUS; SUBCUTANEOUS at 15:45

## 2018-02-02 RX ADMIN — FENTANYL CITRATE 50 MCG: 50 INJECTION, SOLUTION INTRAMUSCULAR; INTRAVENOUS at 15:11

## 2018-02-02 RX ADMIN — SODIUM CHLORIDE, POTASSIUM CHLORIDE, SODIUM LACTATE AND CALCIUM CHLORIDE: 600; 310; 30; 20 INJECTION, SOLUTION INTRAVENOUS at 15:36

## 2018-02-02 RX ADMIN — ROCURONIUM BROMIDE 50 MG: 10 INJECTION INTRAVENOUS at 13:37

## 2018-02-02 RX ADMIN — LIDOCAINE HYDROCHLORIDE 40 MG: 20 INJECTION, SOLUTION INFILTRATION; PERINEURAL at 13:37

## 2018-02-02 RX ADMIN — HYDROMORPHONE HYDROCHLORIDE 0.5 MG: 1 INJECTION, SOLUTION INTRAMUSCULAR; INTRAVENOUS; SUBCUTANEOUS at 13:58

## 2018-02-02 RX ADMIN — CEFAZOLIN SODIUM 2 G: 2 INJECTION, SOLUTION INTRAVENOUS at 13:40

## 2018-02-02 RX ADMIN — SODIUM CHLORIDE, POTASSIUM CHLORIDE, SODIUM LACTATE AND CALCIUM CHLORIDE: 600; 310; 30; 20 INJECTION, SOLUTION INTRAVENOUS at 13:49

## 2018-02-02 RX ADMIN — Medication 5 MG: at 14:36

## 2018-02-02 RX ADMIN — HYDROMORPHONE HYDROCHLORIDE 0.5 MG: 1 INJECTION, SOLUTION INTRAMUSCULAR; INTRAVENOUS; SUBCUTANEOUS at 15:54

## 2018-02-02 RX ADMIN — MIDAZOLAM 2 MG: 1 INJECTION INTRAMUSCULAR; INTRAVENOUS at 13:29

## 2018-02-02 RX ADMIN — ONDANSETRON 4 MG: 2 INJECTION INTRAMUSCULAR; INTRAVENOUS at 14:36

## 2018-02-02 RX ADMIN — FENTANYL CITRATE 50 MCG: 50 INJECTION, SOLUTION INTRAMUSCULAR; INTRAVENOUS at 15:35

## 2018-02-02 RX ADMIN — HYDROMORPHONE HYDROCHLORIDE 0.5 MG: 1 INJECTION, SOLUTION INTRAMUSCULAR; INTRAVENOUS; SUBCUTANEOUS at 16:13

## 2018-02-02 RX ADMIN — LIDOCAINE HYDROCHLORIDE 0.1 ML: 10 INJECTION, SOLUTION EPIDURAL; INFILTRATION; INTRACAUDAL; PERINEURAL at 11:41

## 2018-02-02 RX ADMIN — OXYCODONE HYDROCHLORIDE 10 MG: 5 TABLET ORAL at 16:41

## 2018-02-02 RX ADMIN — ONDANSETRON 4 MG: 2 INJECTION INTRAMUSCULAR; INTRAVENOUS at 15:10

## 2018-02-02 RX ADMIN — FENTANYL CITRATE 50 MCG: 50 INJECTION, SOLUTION INTRAMUSCULAR; INTRAVENOUS at 15:08

## 2018-02-02 RX ADMIN — FENTANYL CITRATE 50 MCG: 50 INJECTION, SOLUTION INTRAMUSCULAR; INTRAVENOUS at 15:28

## 2018-02-02 RX ADMIN — PROPOFOL 200 MG: 10 INJECTION, EMULSION INTRAVENOUS at 13:37

## 2018-02-02 RX ADMIN — SODIUM CHLORIDE, POTASSIUM CHLORIDE, SODIUM LACTATE AND CALCIUM CHLORIDE: 600; 310; 30; 20 INJECTION, SOLUTION INTRAVENOUS at 11:41

## 2018-02-02 RX ADMIN — HYDROMORPHONE HYDROCHLORIDE 0.5 MG: 1 INJECTION, SOLUTION INTRAMUSCULAR; INTRAVENOUS; SUBCUTANEOUS at 15:16

## 2018-02-02 RX ADMIN — GLYCOPYRROLATE 0.4 MG: 0.2 INJECTION, SOLUTION INTRAMUSCULAR; INTRAVENOUS at 14:36

## 2018-02-02 ASSESSMENT — LIFESTYLE VARIABLES: TOBACCO_USE: 1

## 2018-02-02 NOTE — IP AVS SNAPSHOT
MRN:[id number]                      After Visit Summary   2/2/2018    Radha Beckwith    MRN: [id number]           Thank you!     Thank you for choosing [facility] for your care. Our goal is always to provide you with excellent care. Hearing back from our patients is one way we can continue to improve our services. Please take a few minutes to complete the written survey that you may receive in the mail after you visit with us. Thank you!        Patient Information     Date Of Birth          1975        About your hospital stay     You were admitted on:  February 2, 2018 You last received care in the:  [facility]    You were discharged on:  February 2, 2018       Who to Call     For medical emergencies, please call 911.  For non-urgent questions about your medical care, please call your primary care provider or clinic, [phone]  For questions related to your surgery, please call your surgery clinic        Attending Provider     Provider Tigre Marcos DO Surgery       Primary Care Provider Office Phone # Fax #    [facility] [phone]      After Care Instructions     Diet Instructions       Resume pre-procedure diet            Dressing       Keep dressing clean and dry.  Dressing / incisional care as instructed by RN and or Surgeon            Ice to affected area       Ice to operative site PRN            No Alcohol       For 24 hours post procedure            No driving or operating machinery        until the day after procedure            Shower       No shower for 24 hours post procedure. May shower Postoperative Day (POD)  1                  Your next 10 appointments already scheduled     Feb 16, 2018 10:00 AM CST   [facility]   [facility]    [address] [place] [address] [id number] [phone]              Further  instructions from your care team         [facility]    Home Care Following Gallbladder Surgery    Dr. Lowry      Care of the Incision:    Surgical glue was used on your incision, keep it dry for 24 hours, then you may shower but don t submerge under water for at least 2 weeks.  Gently pat your incision dry with a freshly laundered towel.    Do not touch your incision with bare hands or pick at scabs.    Leave your incision open to air.  Cover it only if draining, clothing rubs or irritates it.    Activity:    Gradually increase your activity.  Walk short distances several times each day and increase the distance as your strength allows.    To promote circulation, do not cross your legs while sitting.    Return to work will be determined by the type of work you do and should be discussed with your physician.    Do not drive or operate equipment while taking prescription pain medicines.  You may drive 1 week after surgery if you have stopped taking prescription pain medicines and can react quickly enough to make an emergency stop if necessary.    Diet:    Return to the diet you were on before surgery.    Drink plenty of water.    Avoid foods that cause constipation.      REMEMBER--most prescription pain pills cause constipation.  Walking, extra fluids, and increased fiber (fresh fruits and vegetables, etc.) are natural remedies for constipation.  You can also take mineral oil, 1-2 Tablespoons per day.  If still constipated you may try a stool softener such as Colace or Miralax.    Call Your Physician if You Have:    Redness, increased swelling or cloudy drainage from your incision.    A temperature of more than 101 degrees F.    Worsening pain in your incision not relieved by your prescription pain pills and/or a short rest.    Jaundice (yellowing of skin or eyes)    Abdominal distention (stomach getting very large)    Swelling in your legs    Productive cough    Burning with urination    Any  questions or concerns about your recovery, please call                                  Business hours [phone]    After hours [phone] Nurse Advice Line (24 hours a day)    Follow-up Care:    Make an appointment 2-3 week after your surgery if not already made.  Call [phone].    [facility]   Adult Discharge Orders & Instructions     For 24 hours after surgery    1. Get plenty of rest.  A responsible adult must stay with you for at least 24 hours after you leave the hospital.   2. Do not drive or use heavy equipment.  If you have weakness or tingling, don't drive or use heavy equipment until this feeling goes away.  3. Do not drink alcohol.  4. Avoid strenuous or risky activities.  Ask for help when climbing stairs.   5. You may feel lightheaded.  IF so, sit for a few minutes before standing.  Have someone help you get up.   6. If you have nausea (feel sick to your stomach): Drink only clear liquids such as apple juice, ginger ale, broth or 7-Up.  Rest may also help.  Be sure to drink enough fluids.  Move to a regular diet as you feel able.  7. You may have a slight fever. Call the doctor if your fever is over 100 F (37.7 C) (taken under the tongue) or lasts longer than 24 hours.  8. You may have a dry mouth, a sore throat, muscle aches or trouble sleeping.  These should go away after 24 hours.  9. Do not make important or legal decisions.   Call your doctor for any of the followin.  Signs of infection (fever, growing tenderness at the surgery site, a large amount of drainage or bleeding, severe pain, foul-smelling drainage, redness, swelling).    2. It has been over 8 to 10 hours since surgery and you are still not able to urinate (pass water).    To contact a nurse 24 hours a day call [phone]    Pending Results     Date and Time Order Name Status Description    2018 1427 Surgical pathology exam In process             Admission Information     Date & Time Provider  "Department Dept. Phone    2018 Tigre Lowry, DO [facility] [phone]      Your Vitals Were     Blood Pressure Temperature Respirations Last Period Pulse Oximetry       108/74 97.3  F (36.3  C) (Temporal) 12 (LMP Unknown) 96%       MyChart Information     [employer] lets you send messages to your doctor, view your test results, renew your prescriptions, schedule appointments and more. To sign up, go to [email or web address] . Click on \"Log in\" on the left side of the screen, which will take you to the Welcome page. Then click on \"Sign up Now\" on the right side of the page.     You will be asked to enter the access code listed below, as well as some personal information. Please follow the directions to create your username and password.     Your access code is: [email or web address]  Expires: 3/29/2018  3:51 PM     Your access code will  in 90 days. If you need help or a new code, please call your [facility] clinic or [phone].        Care EveryWhere ID     This is your Care EveryWhere ID. This could be used by other organizations to access your [facility] medical records  [id number]        Equal Access to Services     DIONNE GALLARDO : Calderon gayo Socherelle, waaxda luqadaha, qaybta kaalmada adeegyada, yadi luong. So [facility] [phone].    ATENCIÓN: Si habla español, tiene a rueda disposición servicios gratuitos de asistencia lingüística. Lldanilo al [phone].    We comply with applicable federal civil rights laws and [place] laws. We do not discriminate on the basis of race, color, national origin, age, disability, sex, sexual orientation, or gender identity.               Review of your medicines      START taking        Dose / Directions    oxyCODONE-acetaminophen 5-325 MG per tablet   Commonly known as:  PERCOCET   Used for:  S/P laparoscopic cholecystectomy        Dose:  1-2 tablet   Take 1-2 tablets by mouth every 4 hours as needed " for pain (moderate to severe)   Quantity:  18 tablet   Refills:  0         CONTINUE these medicines which have NOT CHANGED        Dose / Directions    ACETAMINOPHEN PO        Dose:  1000 mg   Take 1,000 mg by mouth every 6 hours as needed for pain   Refills:  0       buprenorphine-naloxone 2-0.5 MG Subl sublingual tablet   Commonly known as:  SUBOXONE   Used for:  Opioid dependence in remission (H)        Dose:  0.5 tablet   Place 0.5 tablets under the tongue 2 times daily   Refills:  0       cetirizine 10 MG tablet   Commonly known as:  zyrTEC   Used for:  Seasonal allergic rhinitis, unspecified chronicity, unspecified trigger        TAKE ONE TABLET BY MOUTH EVERY EVENING   Quantity:  30 tablet   Refills:  3       gabapentin 300 MG capsule   Commonly known as:  NEURONTIN        Dose:  300 mg   Take 1 capsule (300 mg) by mouth 3 times daily   Quantity:  30 capsule   Refills:  1       HYDROcodone-acetaminophen 5-325 MG per tablet   Commonly known as:  NORCO        Dose:  1-2 tablet   Take 1-2 tablets by mouth every 6 hours as needed   Quantity:  6 tablet   Refills:  0       lidocaine 5 % Patch   Commonly known as:  LIDODERM        Apply up to 3 patches to painful area at once for up to 12 h within a 24 h period.  Remove after 12 hours.   Quantity:  30 patch   Refills:  0       LORazepam 1 MG tablet   Commonly known as:  ATIVAN   Used for:  Anxiety        Dose:  1 mg   Take 1 tablet (1 mg) by mouth daily as needed for anxiety (or panic attack. Use sparingly. Do not take before driving.)   Quantity:  20 tablet   Refills:  0       omeprazole 20 MG tablet   Used for:  Gastroesophageal reflux disease, esophagitis presence not specified        Dose:  20 mg   Take 1 tablet (20 mg) by mouth 2 times daily Take 30-60 minutes before a meal.   Quantity:  60 tablet   Refills:  3       ondansetron 4 MG tablet   Commonly known as:  ZOFRAN   Used for:  Anxiety attack        TAKE ONE TABLET BY MOUTH EVERY 6 HOURS AS NEEDED FOR NAUSEA  OR VOMITING   Quantity:  18 tablet   Refills:  10       order for DME   Used for:  Elevated blood pressure reading without diagnosis of hypertension        Blood pressure cuff   Quantity:  1 each   Refills:  0       propranolol 20 MG tablet   Commonly known as:  INDERAL   Used for:  Sinus tachycardia, Anxiety        Dose:  20 mg   Take 1 tablet (20 mg) by mouth 2 times daily   Quantity:  180 tablet   Refills:  1       QUEtiapine 200 MG tablet   Commonly known as:  SEROquel   Used for:  Anxiety, Psychophysiological insomnia        Dose:  200 mg   Take 1 tablet (200 mg) by mouth At Bedtime   Quantity:  90 tablet   Refills:  1       senna-docusate 8.6-50 MG per tablet   Commonly known as:  SENOKOT-S;PERICOLACE   Used for:  S/P hysterectomy        Dose:  1-2 tablet   Take 1-2 tablets by mouth daily as needed for constipation   Quantity:  30 tablet   Refills:  3       sertraline 100 MG tablet   Commonly known as:  ZOLOFT   Used for:  Anxiety        Dose:  200 mg   Take 2 tablets (200 mg) by mouth daily   Quantity:  180 tablet   Refills:  1            Where to get your medicines      Some of these will need a paper prescription and others can be bought over the counter. Ask your nurse if you have questions.     Bring a paper prescription for each of these medications     oxyCODONE-acetaminophen 5-325 MG per tablet                Protect others around you: Learn how to safely use, store and throw away your medicines at www.disposemymeds.org.        Information about OPIOIDS     PRESCRIPTION OPIOIDS: WHAT YOU NEED TO KNOW    Prescription opioids can be used to help relieve moderate to severe pain and are often prescribed following a surgery or injury, or for certain health conditions. These medications can be an important part of treatment but also come with serious risks. It is important to work with your health care provider to make sure you are getting the safest, most effective care.    WHAT ARE THE RISKS AND SIDE  EFFECTS OF OPIOID USE?  Prescription opioids carry serious risks of addiction and overdose, especially with prolonged use. An opioid overdose, often marked by slowed breathing can cause sudden death. The use of prescription opioids can have a number of side effects as well, even when taken as directed:      Tolerance - meaning you might need to take more of a medication for the same pain relief    Physical dependence - meaning you have symptoms of withdrawal when a medication is stopped    Increased sensitivity to pain    Constipation    Nausea, vomiting, and dry mouth    Sleepiness and dizziness    Confusion    Depression    Low levels of testosterone that can result in lower sex drive, energy, and strength    Itching and sweating    RISKS ARE GREATER WITH:    History of drug misuse, substance use disorder, or overdose    Mental health conditions (such as depression or anxiety)    Sleep apnea    Older age (65 years or older)    Pregnancy    Avoid alcohol while taking prescription opioids.   Also, unless specifically advised by your health care provider, medications to avoid include:    Benzodiazepines (such as Xanax or Valium)    Muscle relaxants (such as Soma or Flexeril)    Hypnotics (such as Ambien or Lunesta)    Other prescription opioids    KNOW YOUR OPTIONS:  Talk to your health care provider about ways to manage your pain that do not involve prescription opioids. Some of these options may actually work better and have fewer risks and side effects:    Pain relievers such as acetaminophen, ibuprofen, and naproxen    Some medications that are also used for depression or seizures    Physical therapy and exercise    Cognitive behavioral therapy, a psychological, goal-directed approach, in which patients learn how to modify physical, behavioral, and emotional triggers of pain and stress    IF YOU ARE PRESCRIBED OPIOIDS FOR PAIN:    Never take opioids in greater amounts or more often than prescribed    Follow up  with your primary health care provider and work together to create a plan on how to manage your pain.    Talk about ways to help manage your pain that do not involve prescription opioids    Talk about all concerns and side effects    Help prevent misuse and abuse    Never sell or share prescription opioids    Never use another person's prescription opioids    Store prescription opioids in a secure place and out of reach of others (this may include visitors, children, friends, and family)    Visit www.cdc.gov/drugoverdose to learn about risks of opioid abuse and overdose    If you believe you may be struggling with addiction, tell your health care provider and ask for guidance or call [facility]'s National Helpline at 6-823-250-HELP    LEARN MORE / www.cdc.gov/drugoverdose/prescribing/guideline.html    Safely dispose of unused prescription opioids: Find your local drug take-back programs and more information about the importance of safe disposal at www.doseofreality.mn.gov             Medication List: This is a list of all your medications and when to take them. Check marks below indicate your daily home schedule. Keep this list as a reference.      Medications           Morning Afternoon Evening Bedtime As Needed    ACETAMINOPHEN PO   Take 1,000 mg by mouth every 6 hours as needed for pain                                buprenorphine-naloxone 2-0.5 MG Subl sublingual tablet   Commonly known as:  SUBOXONE   Place 0.5 tablets under the tongue 2 times daily                                cetirizine 10 MG tablet   Commonly known as:  zyrTEC   TAKE ONE TABLET BY MOUTH EVERY EVENING                                gabapentin 300 MG capsule   Commonly known as:  NEURONTIN   Take 1 capsule (300 mg) by mouth 3 times daily                                HYDROcodone-acetaminophen 5-325 MG per tablet   Commonly known as:  NORCO   Take 1-2 tablets by mouth every 6 hours as needed                                lidocaine 5 % Patch    Commonly known as:  LIDODERM   Apply up to 3 patches to painful area at once for up to 12 h within a 24 h period.  Remove after 12 hours.                                LORazepam 1 MG tablet   Commonly known as:  ATIVAN   Take 1 tablet (1 mg) by mouth daily as needed for anxiety (or panic attack. Use sparingly. Do not take before driving.)                                omeprazole 20 MG tablet   Take 1 tablet (20 mg) by mouth 2 times daily Take 30-60 minutes before a meal.                                ondansetron 4 MG tablet   Commonly known as:  ZOFRAN   TAKE ONE TABLET BY MOUTH EVERY 6 HOURS AS NEEDED FOR NAUSEA OR VOMITING                                order for DME   Blood pressure cuff                                oxyCODONE-acetaminophen 5-325 MG per tablet   Commonly known as:  PERCOCET   Take 1-2 tablets by mouth every 4 hours as needed for pain (moderate to severe)                                propranolol 20 MG tablet   Commonly known as:  INDERAL   Take 1 tablet (20 mg) by mouth 2 times daily                                QUEtiapine 200 MG tablet   Commonly known as:  SEROquel   Take 1 tablet (200 mg) by mouth At Bedtime                                senna-docusate 8.6-50 MG per tablet   Commonly known as:  SENOKOT-S;PERICOLACE   Take 1-2 tablets by mouth daily as needed for constipation                                sertraline 100 MG tablet   Commonly known as:  ZOLOFT   Take 2 tablets (200 mg) by mouth daily

## 2018-02-02 NOTE — BRIEF OP NOTE
[facility] Brief Operative Note    Pre-operative diagnosis: biliary colic   Post-operative diagnosis biliary colic   Procedure: Procedure(s):  Laparoscopic Cholecystectomy - Wound Class: II-Clean Contaminated   Surgeon: Tigre Lowry DO   Assistants(s): ASHOK Holley   Estimated blood loss: Less than 10 ml    Specimens: GB and contents   Findings: See dictation

## 2018-02-02 NOTE — DISCHARGE INSTRUCTIONS
[facility]    Home Care Following Gallbladder Surgery    Dr. Lowry      Care of the Incision:    Surgical glue was used on your incision, keep it dry for 24 hours, then you may shower but don t submerge under water for at least 2 weeks.  Gently pat your incision dry with a freshly laundered towel.    Do not touch your incision with bare hands or pick at scabs.    Leave your incision open to air.  Cover it only if draining, clothing rubs or irritates it.    Activity:    Gradually increase your activity.  Walk short distances several times each day and increase the distance as your strength allows.    To promote circulation, do not cross your legs while sitting.    Return to work will be determined by the type of work you do and should be discussed with your physician.    Do not drive or operate equipment while taking prescription pain medicines.  You may drive 1 week after surgery if you have stopped taking prescription pain medicines and can react quickly enough to make an emergency stop if necessary.    Diet:    Return to the diet you were on before surgery.    Drink plenty of water.    Avoid foods that cause constipation.      REMEMBER--most prescription pain pills cause constipation.  Walking, extra fluids, and increased fiber (fresh fruits and vegetables, etc.) are natural remedies for constipation.  You can also take mineral oil, 1-2 Tablespoons per day.  If still constipated you may try a stool softener such as Colace or Miralax.    Call Your Physician if You Have:    Redness, increased swelling or cloudy drainage from your incision.    A temperature of more than 101 degrees F.    Worsening pain in your incision not relieved by your prescription pain pills and/or a short rest.    Jaundice (yellowing of skin or eyes)    Abdominal distention (stomach getting very large)    Swelling in your legs    Productive cough    Burning with urination    Any questions or concerns about your recovery,  please call                                  Business hours [phone]    After hours [phone] Nurse Advice Line (24 hours a day)    Follow-up Care:    Make an appointment 2-3 week after your surgery if not already made.  Call [phone].    [facility]   Adult Discharge Orders & Instructions     For 24 hours after surgery    1. Get plenty of rest.  A responsible adult must stay with you for at least 24 hours after you leave the hospital.   2. Do not drive or use heavy equipment.  If you have weakness or tingling, don't drive or use heavy equipment until this feeling goes away.  3. Do not drink alcohol.  4. Avoid strenuous or risky activities.  Ask for help when climbing stairs.   5. You may feel lightheaded.  IF so, sit for a few minutes before standing.  Have someone help you get up.   6. If you have nausea (feel sick to your stomach): Drink only clear liquids such as apple juice, ginger ale, broth or 7-Up.  Rest may also help.  Be sure to drink enough fluids.  Move to a regular diet as you feel able.  7. You may have a slight fever. Call the doctor if your fever is over 100 F (37.7 C) (taken under the tongue) or lasts longer than 24 hours.  8. You may have a dry mouth, a sore throat, muscle aches or trouble sleeping.  These should go away after 24 hours.  9. Do not make important or legal decisions.   Call your doctor for any of the followin.  Signs of infection (fever, growing tenderness at the surgery site, a large amount of drainage or bleeding, severe pain, foul-smelling drainage, redness, swelling).    2. It has been over 8 to 10 hours since surgery and you are still not able to urinate (pass water).    To contact a nurse 24 hours a day call [phone]

## 2018-02-02 NOTE — ANESTHESIA PREPROCEDURE EVALUATION
Anesthesia Evaluation     . Pt has had prior anesthetic. Type: MAC and General    No history of anesthetic complications          ROS/MED HX    ENT/Pulmonary:     (+)tobacco use, Current use 1/4 ppd x 20 years packs/day  , . .    Neurologic:     (+)migraines,     Cardiovascular:     (+) Dyslipidemia, ----. : . . . :. . No previous cardiac testing       METS/Exercise Tolerance:  >4 METS   Hematologic:  - neg hematologic  ROS       Musculoskeletal:  - neg musculoskeletal ROS       GI/Hepatic:     (+) GERD Asymptomatic on medication,       Renal/Genitourinary:  - ROS Renal section negative       Endo:  - neg endo ROS       Psychiatric:     (+) psychiatric history anxiety, depression and other (comment) (Hx opiate dependancy)      Infectious Disease:  - neg infectious disease ROS       Malignancy:      - no malignancy   Other: Comment: Pt has a history of Opioid addiction 2015, off medication since 6-2017   (+) No chance of pregnancy C-spine cleared: N/A, H/O Chronic Pain,H/O chronic opiod use , no other significant disability                    Physical Exam  Normal systems: cardiovascular, pulmonary and dental    Airway   Mallampati: I  TM distance: >3 FB  Neck ROM: full    Dental     Cardiovascular   Rhythm and rate: regular and normal      Pulmonary    breath sounds clear to auscultation    Other findings:  Hard of Hearing Bilaterally (Uses bilateral HA)                Anesthesia Plan      History & Physical Review  History and physical reviewed and following examination; no interval change.    ASA Status:  2 .    NPO Status:  > 8 hours    Plan for General and ETT with Intravenous and Propofol induction. Maintenance will be Balanced.    PONV prophylaxis:  Ondansetron (or other 5HT-3) and Dexamethasone or Solumedrol       Postoperative Care  Postoperative pain management:  IV analgesics and Oral pain medications.      Consents  Anesthetic plan, risks, benefits and alternatives discussed with:  Patient.  Use of  blood products discussed: Yes.   Use of blood products discussed with Patient.  Consented to blood products.  .                          .

## 2018-02-02 NOTE — ANESTHESIA POSTPROCEDURE EVALUATION
Patient: Radha Beckwith    Procedure(s):  Laparoscopic Cholecystectomy - Wound Class: II-Clean Contaminated    Diagnosis:biliary colic  Diagnosis Additional Information: No value filed.    Anesthesia Type:  General, ETT    Note:  Anesthesia Post Evaluation    Patient location during evaluation: Phase 2 and Bedside  Patient participation: Able to fully participate in evaluation  Level of consciousness: awake and alert  Pain management: adequate  Airway patency: patent  Cardiovascular status: acceptable  Respiratory status: acceptable  Hydration status: acceptable  PONV: none     Anesthetic complications: None    Comments: PT was happy with her care today. VSS. DC to home.        Last vitals:  Vitals:    02/02/18 1615 02/02/18 1645 02/02/18 1700   BP: 108/74 112/66 113/67   Resp: 12 14 14   Temp:      SpO2: 96% 96% 94%         Electronically Signed By: JEANNIE Nathan CRNA  February 2, 2018  5:42 PM

## 2018-02-02 NOTE — ANESTHESIA CARE TRANSFER NOTE
Patient: Radha Beckwith    Procedure(s):  Laparoscopic Cholecystectomy - Wound Class: II-Clean Contaminated    Diagnosis: biliary colic  Diagnosis Additional Information: No value filed.    Anesthesia Type:   General, ETT     Note:    Patient transferred to:PACU  Handoff Report: Identifed the Patient, Identified the Reponsible Provider, Reviewed the pertinent medical history, Discussed the surgical course, Reviewed Intra-OP anesthesia mangement and issues during anesthesia, Set expectations for post-procedure period and Allowed opportunity for questions and acknowledgement of understanding      Vitals: (Last set prior to Anesthesia Care Transfer)    CRNA VITALS  2/2/2018 1416 - 2/2/2018 1453      2/2/2018             EKG: NSR                Electronically Signed By: JEANNIE Viramontes CRNA  February 2, 2018  2:53 PM

## 2018-02-02 NOTE — OP NOTE
Procedure Date: 02/02/2018      DATE OF SERVICE:  02/02/18      TIME:  2:45 p.m.      PROCEDURE:  Laparoscopic cholecystectomy.        PREOPERATIVE DIAGNOSIS:  Biliary colic.      POSTOPERATIVE DIAGNOSIS:  Biliary colic.      SURGEON:  Tigre Lowry DO      ASSISTANT:  Avelina Kramer MS3      ANESTHESIA:  General endotracheal anesthesia.      SPECIMENS:  Gallbladder.      ESTIMATED BLOOD LOSS:  Minimal.      COMPLICATIONS:  None immediately apparent.      INDICATIONS FOR PROCEDURE:  I met Radha in the surgical clinic with chronic complaints of right upper quadrant abdominal pain, postprandial nausea, bloating and diarrhea.  With these typical biliary colic-type symptoms, it was unusual that none of her abdominal ultrasounds or HIDA scans were abnormal.  She did however have reproduction of her right upper quadrant pain and significant intensity during the cholecystokinin injection of her HIDA scan.  I told her with her constellation of symptoms, I do believe she has biliary colic; however, without any other abnormality such as dyskinesia or gallstones, the likelihood of complete resolution of her symptoms with gallbladder removal is slightly less.  We discussed risks, benefits, alternatives to laparoscopic cholecystectomy for her biliary colic and ultimately she agreed to undergo potential risks of surgery for the proposed benefit of not having these symptoms any longer.      DESCRIPTION OF PROCEDURE:  After informed consent was obtained, the patient was brought from the preoperative holding area to the operating room, placed in the supine position.  Anesthesia was induced.  She was prepped and draped in normal sterile fashion.  After timeout was performed, and the correct patient and the correct procedure were verified, we began by making a supraumbilical 5 mm incision.  The incision was the same supraumbilical incision as her previous laparoscopic hysterectomy incision.  Skin was elevated with a towel clamp.   Veress needle was inserted into the peritoneal cavity.  Peritoneal cavity was insufflated with CO2 to 15 mmHg.  A 5 mm trocar was then inserted into the incision.  Camera was inserted.  General survey of the abdomen revealed a small right indirect inguinal hernia.  Liver looked fine.  I then placed a subxiphoid 11 mm trocar under direct visualization and 2 additional 5 mm trocars also under direct visualization in the right subcostal margin.  The patient was placed in the head up, left side down position.        The gallbladder was retracted superolaterally and we began by lysing some adhesions from the gallbladder.  The duodenum was actually adhesed pretty significantly to the gallbladder.  These were filmy adhesions and easy to take down, but there was a number of them and the gallbladder was noted to be pretty stretchy, tissues were very flimsy.  Eventually, we were able to completely dissect the duodenum away from Suad pouch.  The gallbladder was then elevated further, and dissection of the cystic duct began.  I was able to easily identify the cystic artery, and circumferentially dissect around this.  I then began attempting to dissect around the cystic duct.  She had a very redundant Suad pouch and the cystic duct basically folded back in on itself.  I did remove the gallbladder from the cystic plate in order to get completely around the gallbladder more posteriorly.  Once I was confident that I was around the gallbladder and only the gallbladder, there were 2 structures going directly into the gallbladder, namely the cystic duct and the cystic artery.  Therefore, the critical view of safety was achieved.  In order to better dissect out the tortuous cystic duct, I did clip and transect the cystic artery first.  After I did this, I was able to more easily circumferentially dissect around the cystic duct and skeletonize this which elongated the cystic duct and allowed for clip placement.  Two clips were  placed distally and 1 proximally and cystic duct was then transected with EndoShears.        The gallbladder was then removed from the liver bed using hook electrocautery.  Hemostasis was verified after the gallbladder was completely removed from the liver bed.  The clips were visualized.  There was no blood or bile staining in the operative field and the liver bed was hemostatic as well.  The gallbladder was placed in an EndoCatch bag and brought through the 11 mm subxiphoid incision.  Prior to desufflation, we had closed the 11 mm subxiphoid fascial defect with 0 Vicryl suture and a PMI closure device.  Then, the abdomen was desufflated after the ports were removed under direct visualization.  The skin was anesthetized with 0.25% Marcaine with epinephrine for local anesthesia.  Skin was closed with inverted interrupted 4-0 Monocryl sutures and Dermabond dressing was applied.  At the completion of the case, all lap instruments and needles were accounted for with correct count.  The patient was brought to the recovery room in stable condition after awaking from anesthesia.         PAOLA NJ DO             D: 2018   T: 2018   MT: RULA      Name:     JOBY RAMON   MRN:      [id number]        Account:        [id number]   :      1975           Procedure Date: 2018      Document: [id number]

## 2018-02-02 NOTE — IP AVS SNAPSHOT
[facility]    [address] [place] [address]    Phone:  [phone]                                       After Visit Summary   2/2/2018    Radha Beckwith    MRN: [id number]           After Visit Summary Signature Page     I have received my discharge instructions, and my questions have been answered. I have discussed any challenges I see with this plan with the nurse or doctor.    ..........................................................................................................................................  Patient/Patient Representative Signature      ..........................................................................................................................................  Patient Representative Print Name and Relationship to Patient    ..................................................               ................................................  Date                                            Time    ..........................................................................................................................................  Reviewed by Signature/Title    ...................................................              ..............................................  Date                                                            Time

## 2018-02-07 ENCOUNTER — OFFICE VISIT (OUTPATIENT)
Dept: SURGERY | Facility: CLINIC | Age: 43
End: 2018-02-07
Payer: COMMERCIAL

## 2018-02-07 VITALS — HEIGHT: 65 IN | TEMPERATURE: 97 F | BODY MASS INDEX: 26.69 KG/M2 | WEIGHT: 160.2 LBS

## 2018-02-07 DIAGNOSIS — Z90.49 S/P LAPAROSCOPIC CHOLECYSTECTOMY: Primary | ICD-10-CM

## 2018-02-07 LAB — COPATH REPORT: NORMAL

## 2018-02-07 PROCEDURE — 99024 POSTOP FOLLOW-UP VISIT: CPT | Performed by: SURGERY

## 2018-02-07 RX ORDER — OXYCODONE AND ACETAMINOPHEN 5; 325 MG/1; MG/1
1 TABLET ORAL EVERY 4 HOURS PRN
Qty: 12 TABLET | Refills: 0 | Status: SHIPPED | OUTPATIENT
Start: 2018-02-07 | End: 2018-02-21

## 2018-02-07 ASSESSMENT — PAIN SCALES - GENERAL: PAINLEVEL: SEVERE PAIN (7)

## 2018-02-07 NOTE — NURSING NOTE
"Chief Complaint   Patient presents with     Surgical Followup     Laparoscopic Cholecystectomy DOS: 2/2/18. Pt is having post op pain       Initial Temp 97  F (36.1  C) (Temporal)  Ht 1.651 m (5' 5\")  Wt 72.7 kg (160 lb 3.2 oz)  LMP  (LMP Unknown)  BMI 26.66 kg/m2 Estimated body mass index is 26.66 kg/(m^2) as calculated from the following:    Height as of this encounter: 1.651 m (5' 5\").    Weight as of this encounter: 72.7 kg (160 lb 3.2 oz).  Medication Reconciliation: complete     Jessy Wilcox CMA       "

## 2018-02-07 NOTE — PROGRESS NOTES
General Surgery Follow Up    Pt returns for follow up visit s/p cristian mansfield, POD #5    HPI: Radha states she is still experiencing significant pain. She hurts most when she coughs. I originally gave her #18 Percocet and she got #10 more from my partner 2 days ago. She is again out of the pain medicine. She denies jaundice, No acholic stools. No food intolerances. He pain is mostly incisional it seems. Worse with bending and coughing. No nausea or vomiting.         Past Medical History:   Diagnosis Date     Abdominal pain, right lower quadrant 03/09/08    Admit. Discharged 03/10/08     Anxiety attack 7/31/2015     Dehydration      Gastric ulcer 7/31/2015     GERD (gastroesophageal reflux disease) 7/28/2010    Takes omeprazole and metoclopramide      Opioid dependence in remission (H) 8/2/2015     Other and unspecified ovarian cyst      Papanicolaou smear of cervix with low grade squamous intraepithelial lesion (LGSIL) 07/07/2017       Past Surgical History:   Procedure Laterality Date     BIOPSY CERVICAL, LOCAL EXCISION, SINGLE/MULTIPLE N/A 8/10/2017    Procedure: BIOPSY CERVICAL, LOCAL EXCISION, SINGLE/MULTIPLE;;  Surgeon: Michael Chandler MD;  [facility]     COLPOSCOPY, BIOPSY, COMBINED N/A 8/10/2017    Procedure: COMBINED COLPOSCOPY, BIOPSY;  Colposcopy with Cervical Biopsies and Endometrial Biopsy, Exam with Ultrasound;  Surgeon: Michael Chandler MD;  Location: [facility]     ESOPHAGOSCOPY, GASTROSCOPY, DUODENOSCOPY (EGD), COMBINED N/A 4/17/2017    Procedure: COMBINED ESOPHAGOSCOPY, GASTROSCOPY, DUODENOSCOPY (EGD);  Surgeon: Ibrahima Esposito MD;  [facility]     EXAM UNDER ANESTHESIA PELVIC N/A 8/10/2017    Procedure: EXAM UNDER ANESTHESIA PELVIC;;  Surgeon: Michael Chandler MD;  Location: [facility]     HC UGI ENDOSCOPY, SIMPLE EXAM  01/07/08     HYSTERECTOMY       LAPAROSCOPIC CHOLECYSTECTOMY N/A 2/2/2018    Procedure: LAPAROSCOPIC CHOLECYSTECTOMY;  Laparoscopic Cholecystectomy;   Surgeon: Tigre Lowry DO;  [facility]     LAPAROSCOPIC HYSTERECTOMY TOTAL N/A 10/30/2017    Procedure: LAPAROSCOPIC HYSTERECTOMY TOTAL;  LAPAROSCOPIC HYSTERECTOMY TOTAL POSSIBLE SALPINGO-OOPHERECTOMY (BILATERAL);  Surgeon: Michael Chandler MD;  Location: [facility]       Social History     Social History     Marital status: Single     Spouse name: N/A     Number of children: N/A     Years of education: N/A     Occupational History     Not on file.     Social History Main Topics     Smoking status: Current Every Day Smoker     Packs/day: 0.25     Years: 20.00     Types: Cigarettes     Smokeless tobacco: Never Used      Comment: 5-6 cigs daily     Alcohol use Yes      Comment: occasional drinks     Drug use: No     Sexual activity: No     Other Topics Concern     Parent/Sibling W/ Cabg, Mi Or Angioplasty Before 65f 55m? No     Social History Narrative       Current Outpatient Prescriptions   Medication Sig Dispense Refill     oxyCODONE-acetaminophen (PERCOCET) 5-325 MG per tablet Take 1 tablet by mouth every 4 hours as needed for pain 12 tablet 0     buprenorphine-naloxone (SUBOXONE) 2-0.5 MG SUBL sublingual tablet Place 0.5 tablets under the tongue 2 times daily  0     LORazepam (ATIVAN) 1 MG tablet Take 1 tablet (1 mg) by mouth daily as needed for anxiety (or panic attack. Use sparingly. Do not take before driving.) 20 tablet 0     ACETAMINOPHEN PO Take 1,000 mg by mouth every 6 hours as needed for pain       gabapentin (NEURONTIN) 300 MG capsule Take 1 capsule (300 mg) by mouth 3 times daily 30 capsule 1     lidocaine (LIDODERM) 5 % Patch Apply up to 3 patches to painful area at once for up to 12 h within a 24 h period.  Remove after 12 hours. 30 patch 0     cetirizine (ZYRTEC) 10 MG tablet TAKE ONE TABLET BY MOUTH EVERY EVENING 30 tablet 3     omeprazole 20 MG tablet Take 1 tablet (20 mg) by mouth 2 times daily Take 30-60 minutes before a meal. 60 tablet 3     QUEtiapine (SEROQUEL) 200 MG  "tablet Take 1 tablet (200 mg) by mouth At Bedtime 90 tablet 1     sertraline (ZOLOFT) 100 MG tablet Take 2 tablets (200 mg) by mouth daily 180 tablet 1     senna-docusate (SENOKOT-S;PERICOLACE) 8.6-50 MG per tablet Take 1-2 tablets by mouth daily as needed for constipation 30 tablet 3     propranolol (INDERAL) 20 MG tablet Take 1 tablet (20 mg) by mouth 2 times daily 180 tablet 1     ondansetron (ZOFRAN) 4 MG tablet TAKE ONE TABLET BY MOUTH EVERY 6 HOURS AS NEEDED FOR NAUSEA OR VOMITING 18 tablet 10     order for DME Blood pressure cuff 1 each 0     oxyCODONE-acetaminophen (PERCOCET) 5-325 MG per tablet Take 1-2 tablets by mouth every 4 hours as needed for pain (moderate to severe) (Patient not taking: Reported on 2/7/2018) 10 tablet 0       Medications and history reviewed    Physical exam:  Vitals: Temp 97  F (36.1  C) (Temporal)  Ht 1.651 m (5' 5\")  Wt 72.7 kg (160 lb 3.2 oz)  LMP  (LMP Unknown)  BMI 26.66 kg/m2  BMI= Body mass index is 26.66 kg/(m^2).    HEART: RRR, no new murmurs  LUNGS: CTAB, equal chest rise, good effort  ABD: soft, TTP epigastric and R costal margin, non distended  INCISIONS: c/d/i  EXT: STATON, no deformities    PATHOLOGY:  Pending    Assessment:     ICD-10-CM    1. S/P laparoscopic cholecystectomy Z90.49 oxyCODONE-acetaminophen (PERCOCET) 5-325 MG per tablet     Plan: Her post-op complaints and exam suggest that she is having prolonged pain related to the incisions. I told her that I would give her 12 more tabs of Percocet and I would expect that this should get her through next 2-3 days. If at that time her pain is still unmanageable, I would investigate further with US and labs. I reassured her that she should continue to see improvement in the coming days. She may return at her regular post-op appt if she wishes to see me or if she is having any more issues.    Tigre Lowry, DO    "

## 2018-02-07 NOTE — LETTER
2/7/2018         RE: Radha Beckwith  [address] [place] [address]        Dear Colleague,    Thank you for referring your patient, Radha Beckwith, to the [facility]. Please see a copy of my visit note below.    General Surgery Follow Up    Pt returns for follow up visit s/p cristian mansfield, POD #5    HPI: Radha states she is still experiencing significant pain. She hurts most when she coughs. I originally gave her #18 Percocet and she got #10 more from my partner 2 days ago. She is again out of the pain medicine. She denies jaundice, No acholic stools. No food intolerances. He pain is mostly incisional it seems. Worse with bending and coughing. No nausea or vomiting.         Past Medical History:   Diagnosis Date     Abdominal pain, right lower quadrant 03/09/08    Admit. Discharged 03/10/08     Anxiety attack 7/31/2015     Dehydration      Gastric ulcer 7/31/2015     GERD (gastroesophageal reflux disease) 7/28/2010    Takes omeprazole and metoclopramide      Opioid dependence in remission (H) 8/2/2015     Other and unspecified ovarian cyst      Papanicolaou smear of cervix with low grade squamous intraepithelial lesion (LGSIL) 07/07/2017       Past Surgical History:   Procedure Laterality Date     BIOPSY CERVICAL, LOCAL EXCISION, SINGLE/MULTIPLE N/A 8/10/2017    Procedure: BIOPSY CERVICAL, LOCAL EXCISION, SINGLE/MULTIPLE;;  Surgeon: Michael Chandler MD;  [facility]     COLPOSCOPY, BIOPSY, COMBINED N/A 8/10/2017    Procedure: COMBINED COLPOSCOPY, BIOPSY;  Colposcopy with Cervical Biopsies and Endometrial Biopsy, Exam with Ultrasound;  Surgeon: Michael Chandler MD;  [facility]     ESOPHAGOSCOPY, GASTROSCOPY, DUODENOSCOPY (EGD), COMBINED N/A 4/17/2017    Procedure: COMBINED ESOPHAGOSCOPY, GASTROSCOPY, DUODENOSCOPY (EGD);  Surgeon: Ibrahima Esposito MD;  [facility]     EXAM UNDER ANESTHESIA PELVIC N/A 8/10/2017    Procedure: EXAM UNDER ANESTHESIA  PELVIC;;  Surgeon: Michael Chandler MD;  [facility]     HC UGI ENDOSCOPY, SIMPLE EXAM  01/07/08     HYSTERECTOMY       LAPAROSCOPIC CHOLECYSTECTOMY N/A 2/2/2018    Procedure: LAPAROSCOPIC CHOLECYSTECTOMY;  Laparoscopic Cholecystectomy;  Surgeon: Tigre Lowry DO;  [facility]     LAPAROSCOPIC HYSTERECTOMY TOTAL N/A 10/30/2017    Procedure: LAPAROSCOPIC HYSTERECTOMY TOTAL;  LAPAROSCOPIC HYSTERECTOMY TOTAL POSSIBLE SALPINGO-OOPHERECTOMY (BILATERAL);  Surgeon: Michael Chandler MD;  [facility]       Social History     Social History     Marital status: Single     Spouse name: N/A     Number of children: N/A     Years of education: N/A     Occupational History     Not on file.     Social History Main Topics     Smoking status: Current Every Day Smoker     Packs/day: 0.25     Years: 20.00     Types: Cigarettes     Smokeless tobacco: Never Used      Comment: 5-6 cigs daily     Alcohol use Yes      Comment: occasional drinks     Drug use: No     Sexual activity: No     Other Topics Concern     Parent/Sibling W/ Cabg, Mi Or Angioplasty Before 65f 55m? No     Social History Narrative       Current Outpatient Prescriptions   Medication Sig Dispense Refill     oxyCODONE-acetaminophen (PERCOCET) 5-325 MG per tablet Take 1 tablet by mouth every 4 hours as needed for pain 12 tablet 0     buprenorphine-naloxone (SUBOXONE) 2-0.5 MG SUBL sublingual tablet Place 0.5 tablets under the tongue 2 times daily  0     LORazepam (ATIVAN) 1 MG tablet Take 1 tablet (1 mg) by mouth daily as needed for anxiety (or panic attack. Use sparingly. Do not take before driving.) 20 tablet 0     ACETAMINOPHEN PO Take 1,000 mg by mouth every 6 hours as needed for pain       gabapentin (NEURONTIN) 300 MG capsule Take 1 capsule (300 mg) by mouth 3 times daily 30 capsule 1     lidocaine (LIDODERM) 5 % Patch Apply up to 3 patches to painful area at once for up to 12 h within a 24 h period.  Remove after 12 hours. 30  "patch 0     cetirizine (ZYRTEC) 10 MG tablet TAKE ONE TABLET BY MOUTH EVERY EVENING 30 tablet 3     omeprazole 20 MG tablet Take 1 tablet (20 mg) by mouth 2 times daily Take 30-60 minutes before a meal. 60 tablet 3     QUEtiapine (SEROQUEL) 200 MG tablet Take 1 tablet (200 mg) by mouth At Bedtime 90 tablet 1     sertraline (ZOLOFT) 100 MG tablet Take 2 tablets (200 mg) by mouth daily 180 tablet 1     senna-docusate (SENOKOT-S;PERICOLACE) 8.6-50 MG per tablet Take 1-2 tablets by mouth daily as needed for constipation 30 tablet 3     propranolol (INDERAL) 20 MG tablet Take 1 tablet (20 mg) by mouth 2 times daily 180 tablet 1     ondansetron (ZOFRAN) 4 MG tablet TAKE ONE TABLET BY MOUTH EVERY 6 HOURS AS NEEDED FOR NAUSEA OR VOMITING 18 tablet 10     order for DME Blood pressure cuff 1 each 0     oxyCODONE-acetaminophen (PERCOCET) 5-325 MG per tablet Take 1-2 tablets by mouth every 4 hours as needed for pain (moderate to severe) (Patient not taking: Reported on 2/7/2018) 10 tablet 0       Medications and history reviewed    Physical exam:  Vitals: Temp 97  F (36.1  C) (Temporal)  Ht 1.651 m (5' 5\")  Wt 72.7 kg (160 lb 3.2 oz)  LMP  (LMP Unknown)  BMI 26.66 kg/m2  BMI= Body mass index is 26.66 kg/(m^2).    HEART: RRR, no new murmurs  LUNGS: CTAB, equal chest rise, good effort  ABD: soft, TTP epigastric and R costal margin, non distended  INCISIONS: c/d/i  EXT: STATON, no deformities    PATHOLOGY:  Pending    Assessment:     ICD-10-CM    1. S/P laparoscopic cholecystectomy Z90.49 oxyCODONE-acetaminophen (PERCOCET) 5-325 MG per tablet     Plan: Her post-op complaints and exam suggest that she is having prolonged pain related to the incisions. I told her that I would give her 12 more tabs of Percocet and I would expect that this should get her through next 2-3 days. If at that time her pain is still unmanageable, I would investigate further with US and labs. I reassured her that she should continue to see improvement in " the coming days. She may return at her regular post-op appt if she wishes to see me or if she is having any more issues.    Tigre Lowry, DO      Again, thank you for allowing me to participate in the care of your patient.        Sincerely,        Tigre Lowry, DO

## 2018-02-07 NOTE — MR AVS SNAPSHOT
"              After Visit Summary   2/7/2018    Radha Beckwith    MRN: [id number]           Patient Information     Date Of Birth          1975        Visit Information        Provider Department      2/7/2018 12:45 PM Tigre Lowry DO [facility]        Today's Diagnoses     S/P laparoscopic cholecystectomy    -  1       Follow-ups after your visit        Your next 10 appointments already scheduled     Feb 16, 2018 10:00 AM CST   Return Visit with Tigre Lowry DO   [facility]    [address] [place] [id number] [phone]              Who to contact     If you have questions or need follow up information about today's clinic visit or your schedule please contact [facility] directly at [phone].  Normal or non-critical lab and imaging results will be communicated to you by MyChart, letter or phone within 4 business days after the clinic has received the results. If you do not hear from us within 7 days, please contact the clinic through MyChart or phone. If you have a critical or abnormal lab result, we will notify you by phone as soon as possible.  Submit refill requests through [employer] or call your pharmacy and they will forward the refill request to us. Please allow 3 business days for your refill to be completed.          Additional Information About Your Visit        MyChart Information     [employer] lets you send messages to your doctor, view your test results, renew your prescriptions, schedule appointments and more. To sign up, go to [email or web address] . Click on \"Log in\" on the left side of the screen, which will take you to the Welcome page. Then click on \"Sign up Now\" on the right side of the page.     You will be asked to enter the access code listed below, as well as some personal information. Please follow the directions to create your username and password.     Your " "access code is: [email or web address]  Expires: 3/29/2018  3:51 PM     Your access code will  in 90 days. If you need help or a new code, please call your [facility] or [phone].        Care EveryWhere ID     This is your Care EveryWhere ID. This could be used by other organizations to access your [place] medical records  [id number]        Your Vitals Were     Temperature Height Last Period BMI (Body Mass Index)          97  F (36.1  C) (Temporal) 1.651 m (5' 5\") (LMP Unknown) 26.66 kg/m2         Blood Pressure from Last 3 Encounters:   18 113/67   18 116/70   18 120/70    Weight from Last 3 Encounters:   18 72.7 kg (160 lb 3.2 oz)   18 74.2 kg (163 lb 8 oz)   18 73.3 kg (161 lb 8 oz)              Today, you had the following     No orders found for display         Today's Medication Changes          These changes are accurate as of 18  2:38 PM.  If you have any questions, ask your nurse or doctor.               These medicines have changed or have updated prescriptions.        Dose/Directions    * oxyCODONE-acetaminophen 5-325 MG per tablet   Commonly known as:  PERCOCET   This may have changed:  Another medication with the same name was added. Make sure you understand how and when to take each.   Used for:  S/P laparoscopic cholecystectomy   Changed by:  Tigre Lowry,         Dose:  1-2 tablet   Take 1-2 tablets by mouth every 4 hours as needed for pain (moderate to severe)   Quantity:  10 tablet   Refills:  0       * oxyCODONE-acetaminophen 5-325 MG per tablet   Commonly known as:  PERCOCET   This may have changed:  You were already taking a medication with the same name, and this prescription was added. Make sure you understand how and when to take each.   Used for:  S/P laparoscopic cholecystectomy   Changed by:  Tigre Lowry,         Dose:  1 tablet   Take 1 tablet by mouth every 4 hours as needed for pain   Quantity:  12 tablet "   Refills:  0       * Notice:  This list has 2 medication(s) that are the same as other medications prescribed for you. Read the directions carefully, and ask your doctor or other care provider to review them with you.         Where to get your medicines      Some of these will need a paper prescription and others can be bought over the counter.  Ask your nurse if you have questions.     Bring a paper prescription for each of these medications     oxyCODONE-acetaminophen 5-325 MG per tablet                Primary Care Provider Office Phone # Fax #    [facility] [phone] [address] [facility] [address]        Equal Access to Services     JAELYN GALLARDO : Hadmaxine Holt, carrie arce, luan kaalmacrow monroy, yadi amezcua . So [facility] [phone].    ATENCIÓN: Si habla español, tiene a rueda disposición servicios gratuitos de asistencia lingüística. Llame al [phone].    We comply with applicable federal civil rights laws and [place] laws. We do not discriminate on the basis of race, color, national origin, age, disability, sex, sexual orientation, or gender identity.            Thank you!     Thank you for choosing [facility]  for your care. Our goal is always to provide you with excellent care. Hearing back from our patients is one way we can continue to improve our services. Please take a few minutes to complete the written survey that you may receive in the mail after your visit with us. Thank you!             Your Updated Medication List - Protect others around you: Learn how to safely use, store and throw away your medicines at www.disposemymeds.org.          This list is accurate as of 2/7/18  2:38 PM.  Always use your most recent med list.                   Brand Name Dispense Instructions for use Diagnosis    ACETAMINOPHEN PO      Take 1,000 mg by mouth every 6 hours as needed for pain         buprenorphine-naloxone 2-0.5 MG Subl sublingual tablet    SUBOXONE     Place 0.5 tablets under the tongue 2 times daily    Opioid dependence in remission (H)       cetirizine 10 MG tablet    zyrTEC    30 tablet    TAKE ONE TABLET BY MOUTH EVERY EVENING    Seasonal allergic rhinitis, unspecified chronicity, unspecified trigger       gabapentin 300 MG capsule    NEURONTIN    30 capsule    Take 1 capsule (300 mg) by mouth 3 times daily        lidocaine 5 % Patch    LIDODERM    30 patch    Apply up to 3 patches to painful area at once for up to 12 h within a 24 h period.  Remove after 12 hours.        LORazepam 1 MG tablet    ATIVAN    20 tablet    Take 1 tablet (1 mg) by mouth daily as needed for anxiety (or panic attack. Use sparingly. Do not take before driving.)    Anxiety       omeprazole 20 MG tablet     60 tablet    Take 1 tablet (20 mg) by mouth 2 times daily Take 30-60 minutes before a meal.    Gastroesophageal reflux disease, esophagitis presence not specified       ondansetron 4 MG tablet    ZOFRAN    18 tablet    TAKE ONE TABLET BY MOUTH EVERY 6 HOURS AS NEEDED FOR NAUSEA OR VOMITING    Anxiety attack       order for DME     1 each    Blood pressure cuff    Elevated blood pressure reading without diagnosis of hypertension       * oxyCODONE-acetaminophen 5-325 MG per tablet    PERCOCET    10 tablet    Take 1-2 tablets by mouth every 4 hours as needed for pain (moderate to severe)    S/P laparoscopic cholecystectomy       * oxyCODONE-acetaminophen 5-325 MG per tablet    PERCOCET    12 tablet    Take 1 tablet by mouth every 4 hours as needed for pain    S/P laparoscopic cholecystectomy       propranolol 20 MG tablet    INDERAL    180 tablet    Take 1 tablet (20 mg) by mouth 2 times daily    Sinus tachycardia, Anxiety       QUEtiapine 200 MG tablet    SEROquel    90 tablet    Take 1 tablet (200 mg) by mouth At Bedtime    Anxiety, Psychophysiological insomnia       senna-docusate 8.6-50 MG per tablet     SENOKOT-S;PERICOLACE    30 tablet    Take 1-2 tablets by mouth daily as needed for constipation    S/P hysterectomy       sertraline 100 MG tablet    ZOLOFT    180 tablet    Take 2 tablets (200 mg) by mouth daily    Anxiety       * Notice:  This list has 2 medication(s) that are the same as other medications prescribed for you. Read the directions carefully, and ask your doctor or other care provider to review them with you.

## 2018-02-20 ENCOUNTER — TELEPHONE (OUTPATIENT)
Dept: ORTHOPEDICS | Facility: CLINIC | Age: 43
End: 2018-02-20

## 2018-02-20 NOTE — TELEPHONE ENCOUNTER
Reason for Call:  Other call back    Detailed comments: Patient states she is having pain post surgery 2/1/18.  She states she feels full and the incision is painful.  She states she is also constipated.  Please call    Phone Number Patient can be reached at: Home number on file [phone] (home)    Best Time: any    Can we leave a detailed message on this number? YES    Call taken on 2/20/2018 at 11:25 AM by Isabella Vilchis

## 2018-02-20 NOTE — TELEPHONE ENCOUNTER
NURSING NOTE    D:  1.  Patient said she feels fullness and this is painful, also has constipation. This started 5 days ago: She started taking laxatives each evening until 2 days ago when she had a very large (hard to loose) BM. She continued the senna each bairon since the large BM but the fullness pain continued. 1 day ago, she had a small hard BM. Pain 5/10 and up to 9/10 waking her at night. Patient reports this to be the same type of pain she experienced before her gall bladder was removed. Nauseated x 4 days... Last night very nauseated which resolved after taking zofran. Taking tylenol as she cannot take ibuprofen. No longer taking percocet. In addition, she reports some pain to her mid-back which started 5 days ago. She thought this could be related to lying around more than her normal.  2.  Bump on incision. Denies s/s of infection. Small kernel of corn sized bump on incision discovered yesterday. Wound edges approximated.    A:  Continue senna, ice to incisions, appt made to see MD. Reviewed s/s when to go to the ED.    R:  Patient verbalizes understanding and will call back if needed.    Edith Wiley RN  [facility]  2/20/2018 2:43 PM

## 2018-02-21 ENCOUNTER — OFFICE VISIT (OUTPATIENT)
Dept: SURGERY | Facility: CLINIC | Age: 43
End: 2018-02-21
Payer: COMMERCIAL

## 2018-02-21 VITALS — WEIGHT: 160 LBS | BODY MASS INDEX: 26.66 KG/M2 | TEMPERATURE: 98.3 F | HEIGHT: 65 IN

## 2018-02-21 DIAGNOSIS — G89.18 POST-OP PAIN: Primary | ICD-10-CM

## 2018-02-21 PROCEDURE — 99024 POSTOP FOLLOW-UP VISIT: CPT | Performed by: SURGERY

## 2018-02-21 NOTE — MR AVS SNAPSHOT
"              After Visit Summary   2018    Radha Beckwith    MRN: [id number]           Patient Information     Date Of Birth          1975        Visit Information        Provider Department      2018 10:30 AM Tigre Lowry, DO [facility]        Today's Diagnoses     Post-op pain    -  1       Follow-ups after your visit        Who to contact     If you have questions or need follow up information about today's clinic visit or your schedule please contact [facility] directly at [phone].  Normal or non-critical lab and imaging results will be communicated to you by [employer], letter or phone within 4 business days after the clinic has received the results. If you do not hear from us within 7 days, please contact the clinic through [employer] or phone. If you have a critical or abnormal lab result, we will notify you by phone as soon as possible.  Submit refill requests through [employer] or call your pharmacy and they will forward the refill request to us. Please allow 3 business days for your refill to be completed.          Additional Information About Your Visit        MyChart Information     [employer] lets you send messages to your doctor, view your test results, renew your prescriptions, schedule appointments and more. To sign up, go to [email or web address] . Click on \"Log in\" on the left side of the screen, which will take you to the Welcome page. Then click on \"Sign up Now\" on the right side of the page.     You will be asked to enter the access code listed below, as well as some personal information. Please follow the directions to create your username and password.     Your access code is: [email or web address]  Expires: 3/29/2018  3:51 PM     Your access code will  in 90 days. If you need help or a new code, please call your [facility] or [phone].        Care EveryWhere ID     This is your Care EveryWhere ID. This could be used by other " "organizations to access your [place] medical records  [id number]        Your Vitals Were     Temperature Height Last Period BMI (Body Mass Index)          98.3  F (36.8  C) (Temporal) 1.651 m (5' 5\") (LMP Unknown) 26.63 kg/m2         Blood Pressure from Last 3 Encounters:   02/02/18 113/67   01/31/18 116/70   01/25/18 120/70    Weight from Last 3 Encounters:   02/21/18 72.6 kg (160 lb)   02/07/18 72.7 kg (160 lb 3.2 oz)   01/31/18 74.2 kg (163 lb 8 oz)              Today, you had the following     No orders found for display       Primary Care Provider Office Phone # Fax #    [facility] [phone] [address] [facility] [address]        Equal Access to Services     DIONNE GALLARDO : Calderon Holt, wabrandon lujaylene, qaaundreata kaalmada adeluz, yadi amezcua . So [facility] [phone].    ATENCIÓN: Si habla español, tiene a rueda disposición servicios gratuitos de asistencia lingüística. Llame al [phone].    We comply with applicable federal civil rights laws and [place] laws. We do not discriminate on the basis of race, color, national origin, age, disability, sex, sexual orientation, or gender identity.            Thank you!     Thank you for choosing [facility]  for your care. Our goal is always to provide you with excellent care. Hearing back from our patients is one way we can continue to improve our services. Please take a few minutes to complete the written survey that you may receive in the mail after your visit with us. Thank you!             Your Updated Medication List - Protect others around you: Learn how to safely use, store and throw away your medicines at www.disposemymeds.org.          This list is accurate as of 2/21/18 10:52 AM.  Always use your most recent med list.                   Brand Name Dispense Instructions for use Diagnosis    ACETAMINOPHEN PO      Take 1,000 mg by mouth every 6 hours as " needed for pain        buprenorphine-naloxone 2-0.5 MG Subl sublingual tablet    SUBOXONE     Place 0.5 tablets under the tongue 2 times daily    Opioid dependence in remission (H)       cetirizine 10 MG tablet    zyrTEC    30 tablet    TAKE ONE TABLET BY MOUTH EVERY EVENING    Seasonal allergic rhinitis, unspecified chronicity, unspecified trigger       gabapentin 300 MG capsule    NEURONTIN    30 capsule    Take 1 capsule (300 mg) by mouth 3 times daily        lidocaine 5 % Patch    LIDODERM    30 patch    Apply up to 3 patches to painful area at once for up to 12 h within a 24 h period.  Remove after 12 hours.        LORazepam 1 MG tablet    ATIVAN    20 tablet    Take 1 tablet (1 mg) by mouth daily as needed for anxiety (or panic attack. Use sparingly. Do not take before driving.)    Anxiety       omeprazole 20 MG tablet     60 tablet    Take 1 tablet (20 mg) by mouth 2 times daily Take 30-60 minutes before a meal.    Gastroesophageal reflux disease, esophagitis presence not specified       ondansetron 4 MG tablet    ZOFRAN    18 tablet    TAKE ONE TABLET BY MOUTH EVERY 6 HOURS AS NEEDED FOR NAUSEA OR VOMITING    Anxiety attack       order for DME     1 each    Blood pressure cuff    Elevated blood pressure reading without diagnosis of hypertension       propranolol 20 MG tablet    INDERAL    180 tablet    Take 1 tablet (20 mg) by mouth 2 times daily    Sinus tachycardia, Anxiety       QUEtiapine 200 MG tablet    SEROquel    90 tablet    Take 1 tablet (200 mg) by mouth At Bedtime    Anxiety, Psychophysiological insomnia       senna-docusate 8.6-50 MG per tablet    SENOKOT-S;PERICOLACE    30 tablet    Take 1-2 tablets by mouth daily as needed for constipation    S/P hysterectomy       sertraline 100 MG tablet    ZOLOFT    180 tablet    Take 2 tablets (200 mg) by mouth daily    Anxiety

## 2018-02-21 NOTE — PROGRESS NOTES
General Surgery Follow Up    Pt returns for follow up visit s/p laparoscopic cholecystectomy for biliary colic    HPI:  Radha was feeling pretty well last week for a few days. Then over the last couple of days her pre-operative symptoms seemed to have returned somewhat. She has had bloating and nausea and emesis at night. She states that she feels bloated after she eats. She has a hard time standing all day at work.  She has been having some slight constipation although she is no longer taking narcotic pain medication. Minimal incisional tenderness      Past Medical History:   Diagnosis Date     Abdominal pain, right lower quadrant 03/09/08    Admit. Discharged 03/10/08     Anxiety attack 7/31/2015     Dehydration      Gastric ulcer 7/31/2015     GERD (gastroesophageal reflux disease) 7/28/2010    Takes omeprazole and metoclopramide      Opioid dependence in remission (H) 8/2/2015     Other and unspecified ovarian cyst      Papanicolaou smear of cervix with low grade squamous intraepithelial lesion (LGSIL) 07/07/2017       Past Surgical History:   Procedure Laterality Date     BIOPSY CERVICAL, LOCAL EXCISION, SINGLE/MULTIPLE N/A 8/10/2017    Procedure: BIOPSY CERVICAL, LOCAL EXCISION, SINGLE/MULTIPLE;;  Surgeon: Michael Chandler MD;  Location: [facility]     COLPOSCOPY, BIOPSY, COMBINED N/A 8/10/2017    Procedure: COMBINED COLPOSCOPY, BIOPSY;  Colposcopy with Cervical Biopsies and Endometrial Biopsy, Exam with Ultrasound;  Surgeon: Michael Chandler MD;  Location: [facility] OR     ESOPHAGOSCOPY, GASTROSCOPY, DUODENOSCOPY (EGD), COMBINED N/A 4/17/2017    Procedure: COMBINED ESOPHAGOSCOPY, GASTROSCOPY, DUODENOSCOPY (EGD);  Surgeon: Ibrahima Esposito MD;  Location: [facility] GI     EXAM UNDER ANESTHESIA PELVIC N/A 8/10/2017    Procedure: EXAM UNDER ANESTHESIA PELVIC;;  Surgeon: Michael Chandler MD;  Location: [facility] OR     HC UGI ENDOSCOPY, SIMPLE EXAM  01/07/08     HYSTERECTOMY       LAPAROSCOPIC  CHOLECYSTECTOMY N/A 2/2/2018    Procedure: LAPAROSCOPIC CHOLECYSTECTOMY;  Laparoscopic Cholecystectomy;  Surgeon: Tigre Lowry DO;  Location: [facility]     LAPAROSCOPIC HYSTERECTOMY TOTAL N/A 10/30/2017    Procedure: LAPAROSCOPIC HYSTERECTOMY TOTAL;  LAPAROSCOPIC HYSTERECTOMY TOTAL POSSIBLE SALPINGO-OOPHERECTOMY (BILATERAL);  Surgeon: Michael Chandler MD;  Location: [facility] OR       Social History     Social History     Marital status: Single     Spouse name: N/A     Number of children: N/A     Years of education: N/A     Occupational History     Not on file.     Social History Main Topics     Smoking status: Current Every Day Smoker     Packs/day: 0.25     Years: 20.00     Types: Cigarettes     Smokeless tobacco: Never Used      Comment: 5-6 cigs daily     Alcohol use Yes      Comment: occasional drinks     Drug use: No     Sexual activity: No     Other Topics Concern     Parent/Sibling W/ Cabg, Mi Or Angioplasty Before 65f 55m? No     Social History Narrative       Current Outpatient Prescriptions   Medication Sig Dispense Refill     buprenorphine-naloxone (SUBOXONE) 2-0.5 MG SUBL sublingual tablet Place 0.5 tablets under the tongue 2 times daily  0     LORazepam (ATIVAN) 1 MG tablet Take 1 tablet (1 mg) by mouth daily as needed for anxiety (or panic attack. Use sparingly. Do not take before driving.) 20 tablet 0     ACETAMINOPHEN PO Take 1,000 mg by mouth every 6 hours as needed for pain       gabapentin (NEURONTIN) 300 MG capsule Take 1 capsule (300 mg) by mouth 3 times daily 30 capsule 1     lidocaine (LIDODERM) 5 % Patch Apply up to 3 patches to painful area at once for up to 12 h within a 24 h period.  Remove after 12 hours. 30 patch 0     cetirizine (ZYRTEC) 10 MG tablet TAKE ONE TABLET BY MOUTH EVERY EVENING 30 tablet 3     omeprazole 20 MG tablet Take 1 tablet (20 mg) by mouth 2 times daily Take 30-60 minutes before a meal. 60 tablet 3     QUEtiapine (SEROQUEL) 200 MG tablet Take 1 tablet (200  "mg) by mouth At Bedtime 90 tablet 1     sertraline (ZOLOFT) 100 MG tablet Take 2 tablets (200 mg) by mouth daily 180 tablet 1     senna-docusate (SENOKOT-S;PERICOLACE) 8.6-50 MG per tablet Take 1-2 tablets by mouth daily as needed for constipation 30 tablet 3     propranolol (INDERAL) 20 MG tablet Take 1 tablet (20 mg) by mouth 2 times daily 180 tablet 1     ondansetron (ZOFRAN) 4 MG tablet TAKE ONE TABLET BY MOUTH EVERY 6 HOURS AS NEEDED FOR NAUSEA OR VOMITING 18 tablet 10     order for DME Blood pressure cuff 1 each 0       Medications and history reviewed    Physical exam:  Vitals: Temp 98.3  F (36.8  C) (Temporal)  Ht 1.651 m (5' 5\")  Wt 72.6 kg (160 lb)  LMP  (LMP Unknown)  BMI 26.63 kg/m2  BMI= Body mass index is 26.63 kg/(m^2).    HEART: RRR, no new murmurs  LUNGS: CTAB, equal chest rise, good effort  ABD: soft, some epigastric tenderness, non distended  INCISIONS: c/d/i, healing well without issue  EXT: STATON, no deformities      Assessment:     ICD-10-CM    1. Post-op pain G89.18      Plan: Her EGD done within the last year was for epigastric abdominal pain but her post-prandial symptoms with bloating she says are new since that scope. I recommended giving her recovery 2-3 more weeks to see if she notices any improvement of her symptoms. She is to call our clinic and let us know how she is doing. If she has no improvement, at that time I would recommend repeat EGD with colonoscopy. I also offered her gastroenterology referral if she would like. She will call in a few weeks.    Tigre Lowry, DO    "

## 2018-02-21 NOTE — NURSING NOTE
"Chief Complaint   Patient presents with     Surgical Followup     Laparoscopic Cholecystectomy DOS 02/02/18, still has a feeling of \"fullness\" N&V, and pain at incision site and thoughout abdomin       Initial Temp 98.3  F (36.8  C) (Temporal)  Ht 1.651 m (5' 5\")  Wt 72.6 kg (160 lb)  LMP  (LMP Unknown)  BMI 26.63 kg/m2 Estimated body mass index is 26.63 kg/(m^2) as calculated from the following:    Height as of this encounter: 1.651 m (5' 5\").    Weight as of this encounter: 72.6 kg (160 lb).  Medication Reconciliation: complete   Armando ROY CMA      "

## 2018-02-21 NOTE — LETTER
2/21/2018         RE: Radha Beckwith  [address] [place] [address]        Dear Colleague,    Thank you for referring your patient, Radha Beckwith, to the [facility]. Please see a copy of my visit note below.    General Surgery Follow Up    Pt returns for follow up visit s/p laparoscopic cholecystectomy for biliary colic    HPI:  Radha was feeling pretty well last week for a few days. Then over the last couple of days her pre-operative symptoms seemed to have returned somewhat. She has had bloating and nausea and emesis at night. She states that she feels bloated after she eats. She has a hard time standing all day at work.  She has been having some slight constipation although she is no longer taking narcotic pain medication. Minimal incisional tenderness      Past Medical History:   Diagnosis Date     Abdominal pain, right lower quadrant 03/09/08    Admit. Discharged 03/10/08     Anxiety attack 7/31/2015     Dehydration      Gastric ulcer 7/31/2015     GERD (gastroesophageal reflux disease) 7/28/2010    Takes omeprazole and metoclopramide      Opioid dependence in remission (H) 8/2/2015     Other and unspecified ovarian cyst      Papanicolaou smear of cervix with low grade squamous intraepithelial lesion (LGSIL) 07/07/2017       Past Surgical History:   Procedure Laterality Date     BIOPSY CERVICAL, LOCAL EXCISION, SINGLE/MULTIPLE N/A 8/10/2017    Procedure: BIOPSY CERVICAL, LOCAL EXCISION, SINGLE/MULTIPLE;;  Surgeon: Michael Chandler MD;  Location: [facility]     COLPOSCOPY, BIOPSY, COMBINED N/A 8/10/2017    Procedure: COMBINED COLPOSCOPY, BIOPSY;  Colposcopy with Cervical Biopsies and Endometrial Biopsy, Exam with Ultrasound;  Surgeon: Michael Chandler MD;  Location: [facility]     ESOPHAGOSCOPY, GASTROSCOPY, DUODENOSCOPY (EGD), COMBINED N/A 4/17/2017    Procedure: COMBINED ESOPHAGOSCOPY, GASTROSCOPY, DUODENOSCOPY (EGD);  Surgeon: Ibrahima Esposito MD;   Location: [facility]     EXAM UNDER ANESTHESIA PELVIC N/A 8/10/2017    Procedure: EXAM UNDER ANESTHESIA PELVIC;;  Surgeon: Michael Chandler MD;  Location: [facility] OR     HC UGI ENDOSCOPY, SIMPLE EXAM  01/07/08     HYSTERECTOMY       LAPAROSCOPIC CHOLECYSTECTOMY N/A 2/2/2018    Procedure: LAPAROSCOPIC CHOLECYSTECTOMY;  Laparoscopic Cholecystectomy;  Surgeon: Tigre Lowry DO;  Location[facility]     LAPAROSCOPIC HYSTERECTOMY TOTAL N/A 10/30/2017    Procedure: LAPAROSCOPIC HYSTERECTOMY TOTAL;  LAPAROSCOPIC HYSTERECTOMY TOTAL POSSIBLE SALPINGO-OOPHERECTOMY (BILATERAL);  Surgeon: Michael Chandler MD;  Location: [facility]       Social History     Social History     Marital status: Single     Spouse name: N/A     Number of children: N/A     Years of education: N/A     Occupational History     Not on file.     Social History Main Topics     Smoking status: Current Every Day Smoker     Packs/day: 0.25     Years: 20.00     Types: Cigarettes     Smokeless tobacco: Never Used      Comment: 5-6 cigs daily     Alcohol use Yes      Comment: occasional drinks     Drug use: No     Sexual activity: No     Other Topics Concern     Parent/Sibling W/ Cabg, Mi Or Angioplasty Before 65f 55m? No     Social History Narrative       Current Outpatient Prescriptions   Medication Sig Dispense Refill     buprenorphine-naloxone (SUBOXONE) 2-0.5 MG SUBL sublingual tablet Place 0.5 tablets under the tongue 2 times daily  0     LORazepam (ATIVAN) 1 MG tablet Take 1 tablet (1 mg) by mouth daily as needed for anxiety (or panic attack. Use sparingly. Do not take before driving.) 20 tablet 0     ACETAMINOPHEN PO Take 1,000 mg by mouth every 6 hours as needed for pain       gabapentin (NEURONTIN) 300 MG capsule Take 1 capsule (300 mg) by mouth 3 times daily 30 capsule 1     lidocaine (LIDODERM) 5 % Patch Apply up to 3 patches to painful area at once for up to 12 h within a 24 h period.  Remove after 12 hours. 30 patch 0     cetirizine  "(ZYRTEC) 10 MG tablet TAKE ONE TABLET BY MOUTH EVERY EVENING 30 tablet 3     omeprazole 20 MG tablet Take 1 tablet (20 mg) by mouth 2 times daily Take 30-60 minutes before a meal. 60 tablet 3     QUEtiapine (SEROQUEL) 200 MG tablet Take 1 tablet (200 mg) by mouth At Bedtime 90 tablet 1     sertraline (ZOLOFT) 100 MG tablet Take 2 tablets (200 mg) by mouth daily 180 tablet 1     senna-docusate (SENOKOT-S;PERICOLACE) 8.6-50 MG per tablet Take 1-2 tablets by mouth daily as needed for constipation 30 tablet 3     propranolol (INDERAL) 20 MG tablet Take 1 tablet (20 mg) by mouth 2 times daily 180 tablet 1     ondansetron (ZOFRAN) 4 MG tablet TAKE ONE TABLET BY MOUTH EVERY 6 HOURS AS NEEDED FOR NAUSEA OR VOMITING 18 tablet 10     order for DME Blood pressure cuff 1 each 0       Medications and history reviewed    Physical exam:  Vitals: Temp 98.3  F (36.8  C) (Temporal)  Ht 1.651 m (5' 5\")  Wt 72.6 kg (160 lb)  LMP  (LMP Unknown)  BMI 26.63 kg/m2  BMI= Body mass index is 26.63 kg/(m^2).    HEART: RRR, no new murmurs  LUNGS: CTAB, equal chest rise, good effort  ABD: soft, some epigastric tenderness, non distended  INCISIONS: c/d/i, healing well without issue  EXT: STATON, no deformities      Assessment:     ICD-10-CM    1. Post-op pain G89.18      Plan: Her EGD done within the last year was for epigastric abdominal pain but her post-prandial symptoms with bloating she says are new since that scope. I recommended giving her recovery 2-3 more weeks to see if she notices any improvement of her symptoms. She is to call our clinic and let us know how she is doing. If she has no improvement, at that time I would recommend repeat EGD with colonoscopy. I also offered her gastroenterology referral if she would like. She will call in a few weeks.    Tigre Lowry, DO      Again, thank you for allowing me to participate in the care of your patient.        Sincerely,        Tigre Lowry, DO    "

## 2018-02-22 ENCOUNTER — TELEPHONE (OUTPATIENT)
Dept: FAMILY MEDICINE | Facility: OTHER | Age: 43
End: 2018-02-22

## 2018-02-22 DIAGNOSIS — R10.84 ABDOMINAL PAIN, GENERALIZED: Primary | ICD-10-CM

## 2018-02-22 NOTE — TELEPHONE ENCOUNTER
Patient was seen in clinic yesterday with Dr. Lowry,  Will send message to him for further follow up.    Erick Nicolas, RN, BSN

## 2018-02-22 NOTE — TELEPHONE ENCOUNTER
Reason for Call:  Same Day Appointment, Requested Provider:  any provider    [facility]    Reason for visit: wants to discuss colonoscopy and endoscopy still not feeling good after having surgery.    Duration of symptoms: 3 weeks    Have you been treated for this in the past? Yes    Additional comments: Patient would like to get worked in at [place].  She already seen her surgeon and was told to follow up PCP.  Please call    Can we leave a detailed message on this number? YES    Phone number patient can be reached at: Home number on file [phone] (home)    Best Time: any    Call taken on 2/22/2018 at 1:19 PM by Reema Valencia

## 2018-02-22 NOTE — TELEPHONE ENCOUNTER
Patient called frustrated with the current plan of waiting 4 weeks, the EGD if no relief of sx. I did see in yesterday's office visit note that Alen brought up a GI consult. [facility] to [facility].  Edith Wiley RN on 2/22/2018 at 3:28 PM

## 2018-02-23 ENCOUNTER — TELEPHONE (OUTPATIENT)
Dept: FAMILY MEDICINE | Facility: OTHER | Age: 43
End: 2018-02-23

## 2018-02-23 DIAGNOSIS — F41.9 ANXIETY: ICD-10-CM

## 2018-02-28 NOTE — TELEPHONE ENCOUNTER
I was not aware that pt is on suboxone.  I am not comfortable prescribing her lorazepam in the presence of several other opiates.  I think she should discuss this with the prescriber of her opiates as there is a risk of taking these together.  I am happy to help prescribe something else for mood.

## 2018-02-28 NOTE — TELEPHONE ENCOUNTER
Left message asking pt to return our call.  Please inform her of the message below.  Zenon Steiner, CMA

## 2018-03-01 NOTE — TELEPHONE ENCOUNTER
Spoke with patient informed her of message below, patient states she was in a month ago for her pre op and this was discussed as well at that visit with Marisa Story.  Patient is wondering if she still needs to be seen would you be willing to do a phone visit, please advise  Thanks   Jahaira Rivers RT (R)

## 2018-03-01 NOTE — TELEPHONE ENCOUNTER
Pt is also overdue for follow up.  Was supposed to follow up one month after her last visit with me.  I'm not comfortable refilling this without follow up.

## 2018-03-01 NOTE — TELEPHONE ENCOUNTER
Clinic Action Needed:Yes  Reason for Call: Pt returned clinic call and since clinic is closed she will call tomorrow morning to sort through medications. I did read to her the note from Dr VINNY Chino below.   Routed to: Dr Chino care team    Kimberlyn Jordan RN, [facility]

## 2018-03-02 RX ORDER — LORAZEPAM 1 MG/1
1 TABLET ORAL DAILY PRN
Qty: 20 TABLET | Refills: 0 | OUTPATIENT
Start: 2018-03-02

## 2018-03-02 NOTE — TELEPHONE ENCOUNTER
Patient will need office visit for follow up on this before refills.  Thanks,  Marisa Story, CNP

## 2018-03-02 NOTE — PROGRESS NOTES
SUBJECTIVE:   Radha Beckwith is a 42 year old female who presents to clinic today for the following health issues:      History of Present Illness     Depression & Anxiety Follow-up:     Depression/Anxiety:  Depression & Anxiety    Status since last visit::  Stable    Other associated symptoms of depression and anxiety::  YES    Significant life event::  No    Current substance use::  None       Today's PHQ-9         PHQ-9 Total Score:     (P) 13   PHQ-9 Q9 Suicidal ideation:   (P) Not at all   Thoughts of suicide or self harm:      Self-harm Plan:        Self-harm Action:          Safety concerns for self or others:       BO-7 Total Score: (P) 15    Diet:  Regular (no restrictions)  Frequency of exercise:  None  Taking medications regularly:  Yes  Medication side effects:  None  Additional concerns today:  No    Answers for HPI/ROS submitted by the patient on 3/6/2018   PHQ-2 Score: 5  If you checked off any problems, how difficult have these problems made it for you to do your work, take care of things at home, or get along with other people?: Very difficult  PHQ9 TOTAL SCORE: 13  BO 7 TOTAL SCORE: 15    PHQ-9 SCORE 12/29/2017 1/31/2018 3/6/2018   Total Score MyChart 9 (Mild depression) 13 (Moderate depression) 13 (Moderate depression)   Total Score 9 13 13     BO-7 SCORE 12/29/2017 1/31/2018 3/6/2018   Total Score 14 (moderate anxiety) 14 (moderate anxiety) 15 (severe anxiety)   Total Score 14 14 15       Medication Followup of Lorazepam    Taking Medication as prescribed: yes    Side Effects:  None    Medication Helping Symptoms:  yes     She has panic attacks that come out of nowhere and she uses the lorazepam for this. She will take 1 mg and about 20 minutes after this she notices that her symptoms are subsiding. She has a history of opioid dependence and has been on Suboxone for 15 years. She was placed on opioids by her primary care provider for migraines and this is how she became dependent on  "opioids.  Patient lives with her mom who has severe COPD. She provides personal cares for her mom and this is stressful at times. She also has a lot of anxiety over her son who is in the  and is currently in [place]. He will be there for another 2 months. She is able to contact him occasionally on Facebook message or however he is sometimes out of the field for 2-3 weeks at a time and she has no contact during this time.  She works part-time at an auto parts store and this is going well. She is not currently in counseling that plans to look into this. She was seen a counselor for quite some time and that counselor moved out of state. She had a good relationship with his counselor and was hesitant to try to find another counselor. She does have a really good friend that she confides in as needed and is a primary support system for her.  She is status post cholecystectomy and continues to have some stomach issues which she is seeing her gastroenterologist for. She states she will be having a colonoscopy and upper endoscopy in the near future.    Continues to smoke. Is not interested in quitting at this time.    Problem list and histories reviewed & adjusted, as indicated.  Additional history: as documented    Labs reviewed in EPIC    ROS:  Constitutional, HEENT, cardiovascular, pulmonary, GI, , musculoskeletal, neuro, skin, endocrine and psych systems are negative, except as otherwise noted.    OBJECTIVE:     /70 (BP Location: Right arm, Patient Position: Sitting, Cuff Size: Adult Regular)  Pulse 72  Temp 98.2  F (36.8  C) (Oral)  Resp 12  Ht 5' 5\" (1.651 m)  Wt 163 lb 11.2 oz (74.3 kg)  LMP  (LMP Unknown)  BMI 27.24 kg/m2  Body mass index is 27.24 kg/(m^2).  GENERAL: alert and no distress  NECK: no adenopathy, no asymmetry, masses, or scars and thyroid normal to palpation  RESP: lungs clear to auscultation - no rales, rhonchi or wheezes  CV: regular rate and rhythm, normal S1 S2, no S3 or S4, no " murmur, click or rub, no peripheral edema and peripheral pulses strong  MS: no gross musculoskeletal defects noted, no edema  PSYCH: mentation appears normal, affect flat, anxious, judgement and insight intact and appearance well groomed    Diagnostic Test Results:  Urine drug screen pending    ASSESSMENT/PLAN:     1. Panic attacks  Patient to work on finding a counselor as I believe this will help greatly with anxiety, insomnia, and panic attacks. Refill of lorazepam #20 given with 3 refills but only #20 per 30-day period. Controlled substance agreement signed and urine drug screen pending. She did have one tablet of oxycodone she took in the past 24 hours for pain related to post-operative pain from cholecystectomy.   - MENTAL HEALTH REFERRAL  - Adult; Outpatient Treatment; Individual/Couples/Family/Group Therapy/Health Psychology; [facility] [phone]; We will contact you to schedule the appointment or please call with any questions    2. Tobacco use disorder  Not interested in quitting at this time.   - TOBACCO CESSATION ORDER FOR     3. Psychophysiological insomnia  Taking Seroquel at bedtime but still has difficulty with sleep. This has been an ongoing issue and patient continues to work on this.  - MENTAL HEALTH REFERRAL  - Adult; Outpatient Treatment; Individual/Couples/Family/Group Therapy/Health Psychology; [facility]: [facility] [phone]; We will contact you to schedule the appointment or please call with any questions    4. Anxiety  - LORazepam (ATIVAN) 1 MG tablet; Take 1 tablet (1 mg) by mouth daily as needed for anxiety (or panic attack. Use sparingly. Do not take before driving.) 20 tablets to last one month.  Dispense: 20 tablet; Refill: 3    5. Controlled substance agreement signed  - Drug  Screen Comprehensive , Urine with Reported Meds ([employer]) (Pain Care Package)    Greater than 50% of 25 minute visit were spent on counseling or coordination of care  regarding anxiety, panic attacks, insomnia, smoking     JEANNIE Cano [facility]

## 2018-03-02 NOTE — TELEPHONE ENCOUNTER
Pt informed.  She is going to call her work and see what her hours are for next week and then give us a call back to schedule an appointment with EM.  She would like to make EM her PCP.  Will close enc and wait for call back to schedule.  Zenon Steiner, [facility]

## 2018-03-06 ENCOUNTER — OFFICE VISIT (OUTPATIENT)
Dept: FAMILY MEDICINE | Facility: OTHER | Age: 43
End: 2018-03-06
Payer: COMMERCIAL

## 2018-03-06 VITALS
WEIGHT: 163.7 LBS | RESPIRATION RATE: 12 BRPM | HEART RATE: 72 BPM | HEIGHT: 65 IN | SYSTOLIC BLOOD PRESSURE: 118 MMHG | TEMPERATURE: 98.2 F | DIASTOLIC BLOOD PRESSURE: 70 MMHG | BODY MASS INDEX: 27.27 KG/M2

## 2018-03-06 DIAGNOSIS — F41.9 ANXIETY: ICD-10-CM

## 2018-03-06 DIAGNOSIS — F17.200 TOBACCO USE DISORDER: Primary | ICD-10-CM

## 2018-03-06 DIAGNOSIS — F41.0 PANIC ATTACKS: ICD-10-CM

## 2018-03-06 DIAGNOSIS — Z79.899 CONTROLLED SUBSTANCE AGREEMENT SIGNED: ICD-10-CM

## 2018-03-06 DIAGNOSIS — F51.04 PSYCHOPHYSIOLOGICAL INSOMNIA: ICD-10-CM

## 2018-03-06 PROCEDURE — 99214 OFFICE O/P EST MOD 30 MIN: CPT | Performed by: STUDENT IN AN ORGANIZED HEALTH CARE EDUCATION/TRAINING PROGRAM

## 2018-03-06 PROCEDURE — 99000 SPECIMEN HANDLING OFFICE-LAB: CPT | Performed by: STUDENT IN AN ORGANIZED HEALTH CARE EDUCATION/TRAINING PROGRAM

## 2018-03-06 PROCEDURE — 80307 DRUG TEST PRSMV CHEM ANLYZR: CPT | Mod: 90 | Performed by: STUDENT IN AN ORGANIZED HEALTH CARE EDUCATION/TRAINING PROGRAM

## 2018-03-06 RX ORDER — LORAZEPAM 1 MG/1
1 TABLET ORAL DAILY PRN
Qty: 20 TABLET | Refills: 3 | Status: SHIPPED | OUTPATIENT
Start: 2018-03-06 | End: 2018-06-27

## 2018-03-06 ASSESSMENT — PAIN SCALES - GENERAL: PAINLEVEL: MODERATE PAIN (4)

## 2018-03-06 ASSESSMENT — ANXIETY QUESTIONNAIRES
3. WORRYING TOO MUCH ABOUT DIFFERENT THINGS: MORE THAN HALF THE DAYS
5. BEING SO RESTLESS THAT IT IS HARD TO SIT STILL: NOT AT ALL
GAD7 TOTAL SCORE: 15
1. FEELING NERVOUS, ANXIOUS, OR ON EDGE: NEARLY EVERY DAY
GAD7 TOTAL SCORE: 15
6. BECOMING EASILY ANNOYED OR IRRITABLE: NEARLY EVERY DAY
2. NOT BEING ABLE TO STOP OR CONTROL WORRYING: NEARLY EVERY DAY
GAD7 TOTAL SCORE: 15
4. TROUBLE RELAXING: SEVERAL DAYS
7. FEELING AFRAID AS IF SOMETHING AWFUL MIGHT HAPPEN: NEARLY EVERY DAY
7. FEELING AFRAID AS IF SOMETHING AWFUL MIGHT HAPPEN: NEARLY EVERY DAY

## 2018-03-06 ASSESSMENT — PATIENT HEALTH QUESTIONNAIRE - PHQ9
SUM OF ALL RESPONSES TO PHQ QUESTIONS 1-9: 13
10. IF YOU CHECKED OFF ANY PROBLEMS, HOW DIFFICULT HAVE THESE PROBLEMS MADE IT FOR YOU TO DO YOUR WORK, TAKE CARE OF THINGS AT HOME, OR GET ALONG WITH OTHER PEOPLE: VERY DIFFICULT
SUM OF ALL RESPONSES TO PHQ QUESTIONS 1-9: 13

## 2018-03-06 NOTE — LETTER
[facility]    03/06/18    Patient: Radha Beckwith  YOB: 1975  Medical Record Number: [id number]                                                                  Controlled Substance Agreement  I understand that my care provider has prescribed controlled substances (narcotics, tranquilizers, and/or stimulants) to help manage my condition(s).  I am taking this medicine to help me function or work.  I know that this is strong medicine.  It could have serious side effects and even cause a dependency on the drug.  If I stop these medicines suddenly, I could have severe withdrawal symptoms.    The risks, benefits, and side effects of these medication(s) were explained to me.  I agree that:  1. I will take part in other treatments as advised by my provider.  This may be psychiatry or counseling, physical therapy, behavioral therapy, group treatment, or a referral to a pain clinic.  I will reduce or stop my medicine when my provider tells me to do so.   2. I will take my medicines as prescribed.  I will not change the dose or schedule unless my provider tells me to.  There will be no refills if I  run out early.   I may be contacted at any time without warning and asked to complete a drug test or pill count.   3. I will keep all my appointments at the clinic.  If I miss appointments or fail to follow instructions, my provider may stop my medicine.  4. I will not ask other providers to prescribe controlled substances. And I will not accept controlled substances from other people. If I need another prescribed controlled substance for a new reason, I will notify my provider within one business day.  5. If I enroll in the [place] Medical Marijuana program, I will tell my provider.  I will also sign an agreement to share my medical records with my provider.  6. I will use one pharmacy to fill all of my controlled substance prescriptions.  If my prescription is mailed to my pharmacy, it may  take 5 to 7 days for my medicine to be ready.  7. I understand that my provider, clinic care team, and pharmacy can track controlled substance prescriptions from other providers through a central database (prescription monitoring program).  8. I will bring in my list of medications (or my medicine bottles) each time I come to the clinic.  REV- 04/2016                                                                                                                                            Page 1 of 2      [facility]    03/06/18    Patient: Radha Beckwith  YOB: 1975  Medical Record Number: [id number]    9. Refills of controlled substances will be made only during office hours.  It is up to me to make sure that I do not run out of my medicines on weekends or holidays.    10. I am responsible for my prescriptions.  If the medicine is lost or stolen, it will not be replaced.   I also agree not to share these medicines with anyone.  11. I agree to not use ANY illegal or recreational drugs.  This includes marijuana, cocaine, bath salts or other drugs.  I agree not to use alcohol unless my provider says I may.  I agree to give urine samples whenever asked.  If I fail to give a urine sample, the provider may stop my medicine.     12. I will tell my nurse or provider right away if I become pregnant or have a new medical problem treated outside of [facility].  13. I understand that this medicine can affect my thinking and judgment.  It may be unsafe for me to drive, use machinery and do dangerous tasks.  I will not do any of these things until I know how the medicine affects me.  If my dose changes, I will wait to see how it affects me.  I will contact my provider if I have concerns about medicine side effects.  I understand that if I do not follow any of the conditions above, my prescriptions or treatment may be stopped.    I agree that my provider, clinic care team, and pharmacy may  work with any city, state or federal law enforcement agency that investigates the misuse, sale, or other diversion of my controlled medicine. I will allow my provider to discuss my care with or share a copy of this agreement with any other treating provider, pharmacy or emergency room where I receive care.  I agree to give up (waive) any right of privacy or confidentiality with respect to these authorizations.   I have read this agreement and have asked questions about anything I did not understand.   ___________________________________    ___________________________  Patient Signature                                                           Date and Time  ___________________________________     ____________________________  Witness                                                                            Date and Time  ___________________________________  JEANNIE Cano CNP  REV-  04/2016                                                                                                                                                                 Page 2 of 2

## 2018-03-06 NOTE — MR AVS SNAPSHOT
After Visit Summary   3/6/2018    Radha Beckwith    MRN: [id number]           Patient Information     Date Of Birth          1975        Visit Information        Provider Department      3/6/2018 12:00 PM Marisa Story APRN CNP [facility]        Today's Diagnoses     Tobacco use disorder    -  1    Panic attacks        Psychophysiological insomnia        Anxiety        Controlled substance agreement signed           Follow-ups after your visit        Additional Services     MENTAL HEALTH REFERRAL  - Adult; Outpatient Treatment; Individual/Couples/Family/Group Therapy/Health Psychology; G: [facility] [phone]; We will contact you to schedule the appointment or please call with any questions       All scheduling is subject to the client's specific insurance plan & benefits, provider/location availability, and provider clinical specialities.  Please arrive 15 minutes early for your first appointment and bring your completed paperwork.    Please be aware that coverage of these services is subject to the terms and limitations of your health insurance plan.  Call member services at your health plan with any benefit or coverage questions.                            Who to contact     If you have questions or need follow up information about today's clinic visit or your schedule please contact [facility] directly at [phone].  Normal or non-critical lab and imaging results will be communicated to you by MyChart, letter or phone within 4 business days after the clinic has received the results. If you do not hear from us within 7 days, please contact the clinic through MyChart or phone. If you have a critical or abnormal lab result, we will notify you by phone as soon as possible.  Submit refill requests through [employer] or call your pharmacy and they will forward the refill request to us. Please allow 3 business days for your  "refill to be completed.          Additional Information About Your Visit        [employer] Information     [employer] lets you send messages to your doctor, view your test results, renew your prescriptions, schedule appointments and more. To sign up, go to [email or web address] . Click on \"Log in\" on the left side of the screen, which will take you to the Welcome page. Then click on \"Sign up Now\" on the right side of the page.     You will be asked to enter the access code listed below, as well as some personal information. Please follow the directions to create your username and password.     Your access code is: [email or web address]  Expires: 3/29/2018  3:51 PM     Your access code will  in 90 days. If you need help or a new code, please call your [place] clinic or [phone].        Care EveryWhere ID     This is your [facility] EveryWhere ID. This could be used by other organizations to access your [facility] medical records  [id number]        Your Vitals Were     Pulse Temperature Respirations Height Last Period BMI (Body Mass Index)    72 98.2  F (36.8  C) (Oral) 12 5' 5\" (1.651 m) (LMP Unknown) 27.24 kg/m2       Blood Pressure from Last 3 Encounters:   18 118/70   18 113/67   18 116/70    Weight from Last 3 Encounters:   18 163 lb 11.2 oz (74.3 kg)   18 160 lb (72.6 kg)   18 160 lb 3.2 oz (72.7 kg)              We Performed the Following     Drug  Screen Comprehensive , Urine with Reported Meds (Med[employer]ox) (Pain Care Package)     MENTAL HEALTH REFERRAL  - Adult; Outpatient Treatment; Individual/Couples/Family/Group Therapy/Health Psychology; G: [facility] [phone]; We will contact you to schedule the appointment or please call with any questions     TOBACCO CESSATION ORDER FOR [facility]          Today's Medication Changes          These changes are accurate as of 3/6/18  1:08 PM.  If you have any questions, ask your nurse or doctor.               These medicines have " changed or have updated prescriptions.        Dose/Directions    LORazepam 1 MG tablet   Commonly known as:  ATIVAN   This may have changed:  additional instructions   Used for:  Anxiety   Changed by:  Marisa Story APRN CNP        Dose:  1 mg   Take 1 tablet (1 mg) by mouth daily as needed for anxiety (or panic attack. Use sparingly. Do not take before driving.) 20 tablets to last one month.   Quantity:  20 tablet   Refills:  3            Where to get your medicines      Some of these will need a paper prescription and others can be bought over the counter.  Ask your nurse if you have questions.     Bring a paper prescription for each of these medications     LORazepam 1 MG tablet                Primary Care Provider Office Phone # Fax #    JEANNIE Thomas [phone] [address] [address] [place] [address]        Equal Access to Services     [facility]N [facility] : Hadii quintin alexander hadasho Soomaali, waaxda luqadaha, qaybta kaalmada adeegyada, yadi amezcua . So [facility] [phone].    ATENCIÓN: Si habla español, tiene a rueda disposición servicios gratuitos de asistencia lingüística. Llame al [phone].    We comply with applicable federal civil rights laws and [place] laws. We do not discriminate on the basis of race, color, national origin, age, disability, sex, sexual orientation, or gender identity.            Thank you!     Thank you for choosing [facility]  for your care. Our goal is always to provide you with excellent care. Hearing back from our patients is one way we can continue to improve our services. Please take a few minutes to complete the written survey that you may receive in the mail after your visit with us. Thank you!             Your Updated Medication List - Protect others around you: Learn how to safely use, store and throw away your medicines at www.disposemymeds.org.          This list is accurate as of 3/6/18   1:08 PM.  Always use your most recent med list.                   Brand Name Dispense Instructions for use Diagnosis    ACETAMINOPHEN PO      Take 1,000 mg by mouth every 6 hours as needed for pain        buprenorphine-naloxone 2-0.5 MG Subl sublingual tablet    SUBOXONE     Place 0.5 tablets under the tongue 2 times daily    Opioid dependence in remission (H)       cetirizine 10 MG tablet    zyrTEC    30 tablet    TAKE ONE TABLET BY MOUTH EVERY EVENING    Seasonal allergic rhinitis, unspecified chronicity, unspecified trigger       gabapentin 300 MG capsule    NEURONTIN    30 capsule    Take 1 capsule (300 mg) by mouth 3 times daily        lidocaine 5 % Patch    LIDODERM    30 patch    Apply up to 3 patches to painful area at once for up to 12 h within a 24 h period.  Remove after 12 hours.        LORazepam 1 MG tablet    ATIVAN    20 tablet    Take 1 tablet (1 mg) by mouth daily as needed for anxiety (or panic attack. Use sparingly. Do not take before driving.) 20 tablets to last one month.    Anxiety       omeprazole 20 MG tablet     60 tablet    Take 1 tablet (20 mg) by mouth 2 times daily Take 30-60 minutes before a meal.    Gastroesophageal reflux disease, esophagitis presence not specified       ondansetron 4 MG tablet    ZOFRAN    18 tablet    TAKE ONE TABLET BY MOUTH EVERY 6 HOURS AS NEEDED FOR NAUSEA OR VOMITING    Anxiety attack       order for DME     1 each    Blood pressure cuff    Elevated blood pressure reading without diagnosis of hypertension       propranolol 20 MG tablet    INDERAL    180 tablet    Take 1 tablet (20 mg) by mouth 2 times daily    Sinus tachycardia, Anxiety       QUEtiapine 200 MG tablet    SEROquel    90 tablet    Take 1 tablet (200 mg) by mouth At Bedtime    Anxiety, Psychophysiological insomnia       senna-docusate 8.6-50 MG per tablet    SENOKOT-S;PERICOLACE    30 tablet    Take 1-2 tablets by mouth daily as needed for constipation    S/P hysterectomy       sertraline 100 MG  tablet    ZOLOFT    180 tablet    Take 2 tablets (200 mg) by mouth daily    Anxiety

## 2018-03-07 ASSESSMENT — PATIENT HEALTH QUESTIONNAIRE - PHQ9: SUM OF ALL RESPONSES TO PHQ QUESTIONS 1-9: 13

## 2018-03-07 ASSESSMENT — ANXIETY QUESTIONNAIRES: GAD7 TOTAL SCORE: 15

## 2018-03-09 LAB — PAIN DRUG SCR UR W RPTD MEDS: NORMAL

## 2018-04-09 NOTE — PROGRESS NOTES
SUBJECTIVE:   Radha Beckwith is a 42 year old female who presents to clinic today for the following health issues:      HPI     Answers for HPI/ROS submitted by the patient on 4/12/2018   BO 7 TOTAL SCORE: 15    Concern - Follow up Gallbladder  Onset: x 4 weeks    Description:   Itchy Skin, Rashes on arms, red spots    Intensity: mild    Progression of Symptoms:  worsening    Accompanying Signs & Symptoms:  None    Previous history of similar problem:   No  Therapies Tried and outcome: OTC lotions      Problem list and histories reviewed & adjusted, as indicated.  Additional history: as documented        Patient Active Problem List   Diagnosis     GERD (gastroesophageal reflux disease)     Restless legs     Anxiety     Ingrowing nail     Hyperlipidemia LDL goal <100     Hypokalemia     Atypical chest pain     Tobacco abuse     Anxiety attack     Opioid dependence in remission (H)     Papanicolaou smear of cervix with low grade squamous intraepithelial lesion (LGSIL)     S/P hysterectomy     Psychophysiological insomnia     Chronic bilateral low back pain without sciatica     Past Surgical History:   Procedure Laterality Date     BIOPSY CERVICAL, LOCAL EXCISION, SINGLE/MULTIPLE N/A 8/10/2017    Procedure: BIOPSY CERVICAL, LOCAL EXCISION, SINGLE/MULTIPLE;;  Surgeon: Michael Chandler MD;  Location: [facility] OR     COLPOSCOPY, BIOPSY, COMBINED N/A 8/10/2017    Procedure: COMBINED COLPOSCOPY, BIOPSY;  Colposcopy with Cervical Biopsies and Endometrial Biopsy, Exam with Ultrasound;  Surgeon: Michael Chandler MD;  Location: [facility] OR     ESOPHAGOSCOPY, GASTROSCOPY, DUODENOSCOPY (EGD), COMBINED N/A 4/17/2017    Procedure: COMBINED ESOPHAGOSCOPY, GASTROSCOPY, DUODENOSCOPY (EGD);  Surgeon: Ibrahima Esposito MD;  Location: [facility] GI     EXAM UNDER ANESTHESIA PELVIC N/A 8/10/2017    Procedure: EXAM UNDER ANESTHESIA PELVIC;;  Surgeon: Michael Chandler MD;  Location: [facility] OR     HC UGI ENDOSCOPY, SIMPLE EXAM   01/07/08     HYSTERECTOMY       LAPAROSCOPIC CHOLECYSTECTOMY N/A 2/2/2018    Procedure: LAPAROSCOPIC CHOLECYSTECTOMY;  Laparoscopic Cholecystectomy;  Surgeon: Tigre Lowry DO;  Location: [facility] OR     LAPAROSCOPIC HYSTERECTOMY TOTAL N/A 10/30/2017    Procedure: LAPAROSCOPIC HYSTERECTOMY TOTAL;  LAPAROSCOPIC HYSTERECTOMY TOTAL POSSIBLE SALPINGO-OOPHERECTOMY (BILATERAL);  Surgeon: Michael Chandler MD;  Location: [facility] OR       Social History   Substance Use Topics     Smoking status: Current Every Day Smoker     Packs/day: 0.25     Years: 20.00     Types: Cigarettes     Smokeless tobacco: Never Used      Comment: 5-6 cigs daily     Alcohol use Yes      Comment: occasional drinks     Family History   Problem Relation Age of Onset     Depression Mother      Respiratory Mother      Chronic Obstructive Pulmonary Disease Mother      CEREBROVASCULAR DISEASE Father      Brain anyeurism     Adrenal Disorder Other      Chronic Obstructive Pulmonary Disease Other      Breast Cancer Cousin          Current Outpatient Prescriptions   Medication Sig Dispense Refill     ACETAMINOPHEN PO Take 1,000 mg by mouth every 6 hours as needed for pain       buprenorphine-naloxone (SUBOXONE) 2-0.5 MG SUBL sublingual tablet Place 0.5 tablets under the tongue 2 times daily  0     cetirizine (ZYRTEC) 10 MG tablet TAKE ONE TABLET BY MOUTH EVERY EVENING 30 tablet 3     gabapentin (NEURONTIN) 300 MG capsule Take 1 capsule (300 mg) by mouth 3 times daily 30 capsule 1     lidocaine (LIDODERM) 5 % Patch Apply up to 3 patches to painful area at once for up to 12 h within a 24 h period.  Remove after 12 hours. 30 patch 0     LORazepam (ATIVAN) 1 MG tablet Take 1 tablet (1 mg) by mouth daily as needed for anxiety (or panic attack. Use sparingly. Do not take before driving.) 20 tablets to last one month. 20 tablet 3     omeprazole 20 MG tablet Take 1 tablet (20 mg) by mouth 2 times daily Take 30-60 minutes before a meal. 60 tablet 3      ondansetron (ZOFRAN) 4 MG tablet TAKE ONE TABLET BY MOUTH EVERY 6 HOURS AS NEEDED FOR NAUSEA OR VOMITING 18 tablet 10     order for DME Blood pressure cuff 1 each 0     propranolol (INDERAL) 20 MG tablet Take 1 tablet (20 mg) by mouth 2 times daily 180 tablet 1     QUEtiapine (SEROQUEL) 200 MG tablet Take 1 tablet (200 mg) by mouth At Bedtime 90 tablet 1     senna-docusate (SENOKOT-S;PERICOLACE) 8.6-50 MG per tablet Take 1-2 tablets by mouth daily as needed for constipation 30 tablet 3     sertraline (ZOLOFT) 100 MG tablet Take 2 tablets (200 mg) by mouth daily 180 tablet 1     Allergies   Allergen Reactions     Cafergot      12-            GI problems-     Seasonal Allergies      Sumatriptan      vomits after giving herself a shot     Compazine Anxiety     Droperidol Anxiety     Nubain [Nalbuphine Hcl] Anxiety     Prochlorperazine Palpitations     Uncontrolled movement     Recent Labs   Lab Test  04/12/18   1218  01/24/18   1404  12/22/17   1545   08/10/17   1228   LDL   --    --    --    --   Cannot estimate LDL when triglyceride exceeds 400 mg/dL  164*   HDL   --    --    --    --   30*   TRIG   --    --    --    --   608*   ALT  33  62*  28   < >  35   CR  0.65  0.70  0.53   < >  0.67   GFRESTIMATED  >90  >90  >90   < >  >90  Non  GFR Calc     GFRESTBLACK  >90  >90  >90   < >  >90   GFR Calc     POTASSIUM  4.4  3.8  3.8   < >  4.1   TSH   --    --    --    --   1.01    < > = values in this interval not displayed.      BP Readings from Last 3 Encounters:   04/12/18 124/78   03/06/18 118/70   02/02/18 113/67    Wt Readings from Last 3 Encounters:   04/12/18 164 lb (74.4 kg)   03/06/18 163 lb 11.2 oz (74.3 kg)   02/21/18 160 lb (72.6 kg)                  Labs reviewed in EPIC    ROS:  Constitutional, HEENT, cardiovascular, pulmonary, GI, , musculoskeletal, neuro, skin, endocrine and psych systems are negative, except as otherwise noted.    OBJECTIVE:     /78 (BP  "Location: Left arm, Patient Position: Chair, Cuff Size: Adult Regular)  Pulse 89  Temp 97.8  F (36.6  C) (Temporal)  Resp 16  Ht 5' 5\" (1.651 m)  Wt 164 lb (74.4 kg)  LMP  (LMP Unknown)  SpO2 96%  BMI 27.29 kg/m2  Body mass index is 27.29 kg/(m^2).   Physical Exam   Constitutional: She is oriented to person, place, and time. She appears well-developed and well-nourished.   HENT:   Head: Normocephalic and atraumatic.   Right Ear: External ear normal.   Left Ear: External ear normal.   Mouth/Throat: Oropharynx is clear and moist.   Eyes: EOM are normal.   Neck: Neck supple.   Cardiovascular: Normal rate and regular rhythm.    Pulmonary/Chest: Effort normal and breath sounds normal.   Abdominal: Soft. Bowel sounds are normal. There is tenderness.   Musculoskeletal: Normal range of motion.   Neurological: She is alert and oriented to person, place, and time.   Psychiatric: She has a normal mood and affect.         Diagnostic Test Results:  none     ASSESSMENT/PLAN:     Problem List Items Addressed This Visit     None      Visit Diagnoses     Abdominal pain, right upper quadrant    -  Primary    Relevant Orders    Comprehensive metabolic panel (BMP + Alb, Alk Phos, ALT, AST, Total. Bili, TP) (Completed)    Lipase (Completed)    *UA reflex to Microscopic (Completed)    Special screening for malignant neoplasms, colon        Relevant Orders    Fecal colorectal cancer screen (FIT)       reviewed imaging from the last ultrasound and NM scan from 1/17 . She recently had a cholecystectomy which went well in feb 2018. I did do labs to r/o any hepatic causes as a sequelae from surgery. All the labs are essentially normal. The cause of pt's pain is not very clear.she does not have signs of an acute abdomen on exam . I did advise PPI therapy in the interim while we await rest of the labs.  Discussed home care  Reportable signs and symptoms discussed  RTC if symptoms persist or fail to improve    Olga Clemens, " MD  [facility]

## 2018-04-12 ENCOUNTER — OFFICE VISIT (OUTPATIENT)
Dept: FAMILY MEDICINE | Facility: OTHER | Age: 43
End: 2018-04-12
Payer: COMMERCIAL

## 2018-04-12 VITALS
HEIGHT: 65 IN | OXYGEN SATURATION: 96 % | HEART RATE: 89 BPM | RESPIRATION RATE: 16 BRPM | WEIGHT: 164 LBS | TEMPERATURE: 97.8 F | BODY MASS INDEX: 27.32 KG/M2 | SYSTOLIC BLOOD PRESSURE: 124 MMHG | DIASTOLIC BLOOD PRESSURE: 78 MMHG

## 2018-04-12 DIAGNOSIS — Z12.11 SPECIAL SCREENING FOR MALIGNANT NEOPLASMS, COLON: ICD-10-CM

## 2018-04-12 DIAGNOSIS — R10.11 ABDOMINAL PAIN, RIGHT UPPER QUADRANT: Primary | ICD-10-CM

## 2018-04-12 LAB
ALBUMIN SERPL-MCNC: 3.5 G/DL (ref 3.4–5)
ALBUMIN UR-MCNC: NEGATIVE MG/DL
ALP SERPL-CCNC: 95 U/L (ref 40–150)
ALT SERPL W P-5'-P-CCNC: 33 U/L (ref 0–50)
ANION GAP SERPL CALCULATED.3IONS-SCNC: 6 MMOL/L (ref 3–14)
APPEARANCE UR: CLEAR
AST SERPL W P-5'-P-CCNC: 22 U/L (ref 0–45)
BILIRUB SERPL-MCNC: 0.3 MG/DL (ref 0.2–1.3)
BILIRUB UR QL STRIP: NEGATIVE
BUN SERPL-MCNC: 18 MG/DL (ref 7–30)
CALCIUM SERPL-MCNC: 9.2 MG/DL (ref 8.5–10.1)
CHLORIDE SERPL-SCNC: 101 MMOL/L (ref 94–109)
CO2 SERPL-SCNC: 29 MMOL/L (ref 20–32)
COLOR UR AUTO: YELLOW
CREAT SERPL-MCNC: 0.65 MG/DL (ref 0.52–1.04)
GFR SERPL CREATININE-BSD FRML MDRD: >90 ML/MIN/1.7M2
GLUCOSE SERPL-MCNC: 114 MG/DL (ref 70–99)
GLUCOSE UR STRIP-MCNC: NEGATIVE MG/DL
HGB UR QL STRIP: NEGATIVE
KETONES UR STRIP-MCNC: NEGATIVE MG/DL
LEUKOCYTE ESTERASE UR QL STRIP: NEGATIVE
LIPASE SERPL-CCNC: 157 U/L (ref 73–393)
NITRATE UR QL: NEGATIVE
PH UR STRIP: 6 PH (ref 5–7)
POTASSIUM SERPL-SCNC: 4.4 MMOL/L (ref 3.4–5.3)
PROT SERPL-MCNC: 7.2 G/DL (ref 6.8–8.8)
SODIUM SERPL-SCNC: 136 MMOL/L (ref 133–144)
SOURCE: NORMAL
SP GR UR STRIP: 1.01 (ref 1–1.03)
UROBILINOGEN UR STRIP-ACNC: 0.2 EU/DL (ref 0.2–1)

## 2018-04-12 PROCEDURE — 80053 COMPREHEN METABOLIC PANEL: CPT | Performed by: FAMILY MEDICINE

## 2018-04-12 PROCEDURE — 83690 ASSAY OF LIPASE: CPT | Performed by: FAMILY MEDICINE

## 2018-04-12 PROCEDURE — 99214 OFFICE O/P EST MOD 30 MIN: CPT | Performed by: FAMILY MEDICINE

## 2018-04-12 PROCEDURE — 36415 COLL VENOUS BLD VENIPUNCTURE: CPT | Performed by: FAMILY MEDICINE

## 2018-04-12 PROCEDURE — 81003 URINALYSIS AUTO W/O SCOPE: CPT | Performed by: FAMILY MEDICINE

## 2018-04-12 ASSESSMENT — ANXIETY QUESTIONNAIRES
GAD7 TOTAL SCORE: 15
GAD7 TOTAL SCORE: 15
1. FEELING NERVOUS, ANXIOUS, OR ON EDGE: NEARLY EVERY DAY
7. FEELING AFRAID AS IF SOMETHING AWFUL MIGHT HAPPEN: NEARLY EVERY DAY
2. NOT BEING ABLE TO STOP OR CONTROL WORRYING: MORE THAN HALF THE DAYS
6. BECOMING EASILY ANNOYED OR IRRITABLE: NEARLY EVERY DAY
5. BEING SO RESTLESS THAT IT IS HARD TO SIT STILL: NOT AT ALL
3. WORRYING TOO MUCH ABOUT DIFFERENT THINGS: NEARLY EVERY DAY
7. FEELING AFRAID AS IF SOMETHING AWFUL MIGHT HAPPEN: NEARLY EVERY DAY
4. TROUBLE RELAXING: SEVERAL DAYS
GAD7 TOTAL SCORE: 15

## 2018-04-12 ASSESSMENT — PAIN SCALES - GENERAL: PAINLEVEL: MODERATE PAIN (4)

## 2018-04-12 NOTE — NURSING NOTE
"Chief Complaint   Patient presents with     Follow Up For     gallbladder     Panel Management     BO       Initial /78 (BP Location: Left arm, Patient Position: Chair, Cuff Size: Adult Regular)  Pulse 89  Temp 97.8  F (36.6  C) (Temporal)  Resp 16  Ht 5' 5\" (1.651 m)  Wt 164 lb (74.4 kg)  LMP  (LMP Unknown)  SpO2 96%  BMI 27.29 kg/m2 Estimated body mass index is 27.29 kg/(m^2) as calculated from the following:    Height as of this encounter: 5' 5\" (1.651 m).    Weight as of this encounter: 164 lb (74.4 kg).  Medication Reconciliation: complete    "

## 2018-04-12 NOTE — MR AVS SNAPSHOT
"              After Visit Summary   4/12/2018    Radha Beckwith    MRN: [id number]           Patient Information     Date Of Birth          1975        Visit Information        Provider Department      4/12/2018 11:40 AM Olga Clemens MD [facility]        Today's Diagnoses     Abdominal pain, right upper quadrant    -  1    Special screening for malignant neoplasms, colon           Follow-ups after your visit        Future tests that were ordered for you today     Open Future Orders        Priority Expected Expires Ordered    Fecal colorectal cancer screen (FIT) Routine 5/3/2018 7/5/2018 4/12/2018            Who to contact     If you have questions or need follow up information about today's clinic visit or your schedule please contact [facility] directly at [phone].  Normal or non-critical lab and imaging results will be communicated to you by MyChart, letter or phone within 4 business days after the clinic has received the results. If you do not hear from us within 7 days, please contact the clinic through MyChart or phone. If you have a critical or abnormal lab result, we will notify you by phone as soon as possible.  Submit refill requests through [employer] or call your pharmacy and they will forward the refill request to us. Please allow 3 business days for your refill to be completed.          Additional Information About Your Visit        [employer] Information     [employer] lets you send messages to your doctor, view your test results, renew your prescriptions, schedule appointments and more. To sign up, go to [email or web address] . Click on \"Log in\" on the left side of the screen, which will take you to the Welcome page. Then click on \"Sign up Now\" on the right side of the page.     You will be asked to enter the access code listed below, as well as some personal information. Please follow the directions to create your username and password.     Your access " "code is: [email or web address]  Expires: 2018 12:18 PM     Your access code will  in 90 days. If you need help or a new code, please call your [place] clinic or [phone].        Care EveryWhere ID     This is your Care EveryWhere ID. This could be used by other organizations to access your [place] medical records  [id number]        Your Vitals Were     Pulse Temperature Respirations Height Last Period Pulse Oximetry    89 97.8  F (36.6  C) (Temporal) 16 5' 5\" (1.651 m) (LMP Unknown) 96%    BMI (Body Mass Index)                   27.29 kg/m2            Blood Pressure from Last 3 Encounters:   18 124/78   18 118/70   18 113/67    Weight from Last 3 Encounters:   18 164 lb (74.4 kg)   18 163 lb 11.2 oz (74.3 kg)   18 160 lb (72.6 kg)              We Performed the Following     *UA reflex to Microscopic     Comprehensive metabolic panel (BMP + Alb, Alk Phos, ALT, AST, Total. Bili, TP)     Lipase        Primary Care Provider Office Phone # Fax #    Marisa JEANNIE Huerta [facility] [phone] [address] [address] [place] [address]        Equal Access to Services     DIONNE GALLARDO : Hadii aad ku hadasho Soomaali, waaxda luqadaha, qaybta kaalmada adeegyada, yadi amezcua . So [facility] [phone].    ATENCIÓN: Si habla español, tiene a rueda disposición servicios gratuitos de asistencia lingüística. Llame al [phone].    We comply with applicable federal civil rights laws and [place] laws. We do not discriminate on the basis of race, color, national origin, age, disability, sex, sexual orientation, or gender identity.            Thank you!     Thank you for choosing [facility]  for your care. Our goal is always to provide you with excellent care. Hearing back from our patients is one way we can continue to improve our services. Please take a few minutes to complete the written survey that you may receive in the mail " after your visit with us. Thank you!             Your Updated Medication List - Protect others around you: Learn how to safely use, store and throw away your medicines at www.disposemymeds.org.          This list is accurate as of 4/12/18 12:18 PM.  Always use your most recent med list.                   Brand Name Dispense Instructions for use Diagnosis    ACETAMINOPHEN PO      Take 1,000 mg by mouth every 6 hours as needed for pain        buprenorphine-naloxone 2-0.5 MG Subl sublingual tablet    SUBOXONE     Place 0.5 tablets under the tongue 2 times daily    Opioid dependence in remission (H)       cetirizine 10 MG tablet    zyrTEC    30 tablet    TAKE ONE TABLET BY MOUTH EVERY EVENING    Seasonal allergic rhinitis, unspecified chronicity, unspecified trigger       gabapentin 300 MG capsule    NEURONTIN    30 capsule    Take 1 capsule (300 mg) by mouth 3 times daily        lidocaine 5 % Patch    LIDODERM    30 patch    Apply up to 3 patches to painful area at once for up to 12 h within a 24 h period.  Remove after 12 hours.        LORazepam 1 MG tablet    ATIVAN    20 tablet    Take 1 tablet (1 mg) by mouth daily as needed for anxiety (or panic attack. Use sparingly. Do not take before driving.) 20 tablets to last one month.    Anxiety       omeprazole 20 MG tablet     60 tablet    Take 1 tablet (20 mg) by mouth 2 times daily Take 30-60 minutes before a meal.    Gastroesophageal reflux disease, esophagitis presence not specified       ondansetron 4 MG tablet    ZOFRAN    18 tablet    TAKE ONE TABLET BY MOUTH EVERY 6 HOURS AS NEEDED FOR NAUSEA OR VOMITING    Anxiety attack       order for DME     1 each    Blood pressure cuff    Elevated blood pressure reading without diagnosis of hypertension       propranolol 20 MG tablet    INDERAL    180 tablet    Take 1 tablet (20 mg) by mouth 2 times daily    Sinus tachycardia, Anxiety       QUEtiapine 200 MG tablet    SEROquel    90 tablet    Take 1 tablet (200 mg) by mouth  At Bedtime    Anxiety, Psychophysiological insomnia       senna-docusate 8.6-50 MG per tablet    SENOKOT-S;PERICOLACE    30 tablet    Take 1-2 tablets by mouth daily as needed for constipation    S/P hysterectomy       sertraline 100 MG tablet    ZOLOFT    180 tablet    Take 2 tablets (200 mg) by mouth daily    Anxiety

## 2018-04-13 ASSESSMENT — ANXIETY QUESTIONNAIRES: GAD7 TOTAL SCORE: 15

## 2018-05-17 ENCOUNTER — TELEPHONE (OUTPATIENT)
Dept: FAMILY MEDICINE | Facility: CLINIC | Age: 43
End: 2018-05-17

## 2018-05-17 NOTE — TELEPHONE ENCOUNTER
Reason for Call:  Other questions    Detailed comments: patient calling states she had a hysterectomy done in October 30, 2017 by Dr. Chandler. Patient is experiencing issues. States that her vaginal area and becca are so sore and painful, also bumps inside her vagina Patient would like to talk to one of Dr. Chandler's nurses. Please call and advise     Phone Number Patient can be reached at: Cell number on file:    Telephone Information:   Mobile [phone]       Best Time: any    Can we leave a detailed message on this number? YES    Call taken on 5/17/2018 at 10:53 AM by Beena Monroy

## 2018-05-18 ENCOUNTER — OFFICE VISIT (OUTPATIENT)
Dept: FAMILY MEDICINE | Facility: CLINIC | Age: 43
End: 2018-05-18
Payer: COMMERCIAL

## 2018-05-18 VITALS
WEIGHT: 167 LBS | HEART RATE: 76 BPM | TEMPERATURE: 98 F | OXYGEN SATURATION: 98 % | SYSTOLIC BLOOD PRESSURE: 128 MMHG | RESPIRATION RATE: 16 BRPM | BODY MASS INDEX: 27.79 KG/M2 | DIASTOLIC BLOOD PRESSURE: 84 MMHG

## 2018-05-18 DIAGNOSIS — N89.8 VAGINAL LESION: Primary | ICD-10-CM

## 2018-05-18 PROCEDURE — 88305 TISSUE EXAM BY PATHOLOGIST: CPT | Mod: 26 | Performed by: OBSTETRICS & GYNECOLOGY

## 2018-05-18 PROCEDURE — 99213 OFFICE O/P EST LOW 20 MIN: CPT | Mod: 25 | Performed by: OBSTETRICS & GYNECOLOGY

## 2018-05-18 PROCEDURE — 11100 HC BIOPSY SKIN/SUBQ/MUC MEM, SINGLE LESION: CPT | Performed by: OBSTETRICS & GYNECOLOGY

## 2018-05-18 PROCEDURE — 88312 SPECIAL STAINS GROUP 1: CPT | Performed by: OBSTETRICS & GYNECOLOGY

## 2018-05-18 PROCEDURE — 88305 TISSUE EXAM BY PATHOLOGIST: CPT | Performed by: OBSTETRICS & GYNECOLOGY

## 2018-05-18 PROCEDURE — 88312 SPECIAL STAINS GROUP 1: CPT | Mod: 26 | Performed by: OBSTETRICS & GYNECOLOGY

## 2018-05-18 RX ORDER — FLUCONAZOLE 150 MG/1
150 TABLET ORAL
Qty: 4 TABLET | Refills: 0 | Status: SHIPPED | OUTPATIENT
Start: 2018-05-18 | End: 2018-11-20

## 2018-05-18 RX ORDER — LIDOCAINE 50 MG/G
OINTMENT TOPICAL 3 TIMES DAILY
Qty: 50 G | Refills: 1 | Status: SHIPPED | OUTPATIENT
Start: 2018-05-18 | End: 2018-11-20

## 2018-05-18 ASSESSMENT — PAIN SCALES - GENERAL: PAINLEVEL: EXTREME PAIN (8)

## 2018-05-18 NOTE — MR AVS SNAPSHOT
"              After Visit Summary   5/18/2018    Radha Beckwith    MRN: [id number]           Patient Information     Date Of Birth          1975        Visit Information        Provider Department      5/18/2018 9:40 AM Michael Chandler MD [facility]        Today's Diagnoses     Vaginal lesion    -  1       Follow-ups after your visit        Your next 10 appointments already scheduled     May 24, 2018 12:10 PM CDT   [facility] with [facility] Chandler MD   [facility]    [address] [place] [id number] [phone]              Who to contact     If you have questions or need follow up information about today's clinic visit or your schedule please contact [facility] directly at [phone].  Normal or non-critical lab and imaging results will be communicated to you by MyChart, letter or phone within 4 business days after the clinic has received the results. If you do not hear from us within 7 days, please contact the clinic through MyChart or phone. If you have a critical or abnormal lab result, we will notify you by phone as soon as possible.  Submit refill requests through [employer] or call your pharmacy and they will forward the refill request to us. Please allow 3 business days for your refill to be completed.          Additional Information About Your Visit        MyChart Information     [employer] lets you send messages to your doctor, view your test results, renew your prescriptions, schedule appointments and more. To sign up, go to [email or web address] . Click on \"Log in\" on the left side of the screen, which will take you to the Welcome page. Then click on \"Sign up Now\" on the right side of the page.     You will be asked to enter the access code listed below, as well as some personal information. Please follow the directions to create your username and password.     Your access code is: " [email or web address]  Expires: 2018 12:18 PM     Your access code will  in 90 days. If you need help or a new code, please call your [facility] clinic or [phone].        Care EveryWhere ID     This is your Care EveryWhere ID. This could be used by other organizations to access your [facility] medical records  [id number]        Your Vitals Were     Pulse Temperature Respirations Last Period Pulse Oximetry BMI (Body Mass Index)    76 98  F (36.7  C) (Temporal) 16 (LMP Unknown) 98% 27.79 kg/m2       Blood Pressure from Last 3 Encounters:   18 128/84   18 124/78   18 118/70    Weight from Last 3 Encounters:   18 167 lb (75.8 kg)   18 164 lb (74.4 kg)   18 163 lb 11.2 oz (74.3 kg)              We Performed the Following     BIOPSY SKIN/SUBQ/MUC MEM, SINGLE LESION          Today's Medication Changes          These changes are accurate as of 18 10:27 AM.  If you have any questions, ask your nurse or doctor.               Start taking these medicines.        Dose/Directions    fluconazole 150 MG tablet   Commonly known as:  DIFLUCAN   Used for:  Vaginal lesion   Started by:  Michael Chandler MD        Dose:  150 mg   Take 1 tablet (150 mg) by mouth every 3 days   Quantity:  4 tablet   Refills:  0         These medicines have changed or have updated prescriptions.        Dose/Directions    * lidocaine 5 % Patch   Commonly known as:  LIDODERM   This may have changed:  Another medication with the same name was added. Make sure you understand how and when to take each.   Changed by:  Michael Chandler MD        Apply up to 3 patches to painful area at once for up to 12 h within a 24 h period.  Remove after 12 hours.   Quantity:  30 patch   Refills:  0       * lidocaine 5 % ointment   Commonly known as:  XYLOCAINE   This may have changed:  You were already taking a medication with the same name, and this prescription was added. Make sure you understand how and when  to take each.   Used for:  Vaginal lesion   Changed by:  Michael Chandler MD        Apply topically 3 times daily   Quantity:  50 g   Refills:  1       * Notice:  This list has 2 medication(s) that are the same as other medications prescribed for you. Read the directions carefully, and ask your doctor or other care provider to review them with you.         Where to get your medicines      These medications were sent to [facility] [place], [place] - [address] [facility]   [address] [facility] [facility] , [facility] [address]     Phone:  [phone]     fluconazole 150 MG tablet    lidocaine 5 % ointment                Primary Care Provider Office Phone # Fax #    Marisa Story, APRN [facility] [phone] [address] [address] [place] [address]        Equal Access to Services     [facility]: Hadii quintin alexander hadasho Soomaali, waaxda luqadaha, qaybta kaalmada adeegyada, waxay ryder amezcua . So [facility] [phone].    ATENCIÓN: Si habla español, tiene a rueda disposición servicios gratuitos de asistencia lingüística. Nancy al [phone].    We comply with applicable federal civil rights laws and [place] laws. We do not discriminate on the basis of race, color, national origin, age, disability, sex, sexual orientation, or gender identity.            Thank you!     Thank you for choosing [facility]  for your care. Our goal is always to provide you with excellent care. Hearing back from our patients is one way we can continue to improve our services. Please take a few minutes to complete the written survey that you may receive in the mail after your visit with us. Thank you!             Your Updated Medication List - Protect others around you: Learn how to safely use, store and throw away your medicines at www.disposemymeds.org.          This list is accurate as of 5/18/18 10:27 AM.  Always use your most recent med list.                   Brand Name Dispense  Instructions for use Diagnosis    ACETAMINOPHEN PO      Take 1,000 mg by mouth every 6 hours as needed for pain        buprenorphine-naloxone 2-0.5 MG Subl sublingual tablet    SUBOXONE     Place 0.5 tablets under the tongue 2 times daily    Opioid dependence in remission (H)       cetirizine 10 MG tablet    zyrTEC    30 tablet    TAKE ONE TABLET BY MOUTH EVERY EVENING    Seasonal allergic rhinitis, unspecified chronicity, unspecified trigger       fluconazole 150 MG tablet    DIFLUCAN    4 tablet    Take 1 tablet (150 mg) by mouth every 3 days    Vaginal lesion       gabapentin 300 MG capsule    NEURONTIN    30 capsule    Take 1 capsule (300 mg) by mouth 3 times daily        * lidocaine 5 % Patch    LIDODERM    30 patch    Apply up to 3 patches to painful area at once for up to 12 h within a 24 h period.  Remove after 12 hours.        * lidocaine 5 % ointment    XYLOCAINE    50 g    Apply topically 3 times daily    Vaginal lesion       LORazepam 1 MG tablet    ATIVAN    20 tablet    Take 1 tablet (1 mg) by mouth daily as needed for anxiety (or panic attack. Use sparingly. Do not take before driving.) 20 tablets to last one month.    Anxiety       omeprazole 20 MG tablet     60 tablet    Take 1 tablet (20 mg) by mouth 2 times daily Take 30-60 minutes before a meal.    Gastroesophageal reflux disease, esophagitis presence not specified       ondansetron 4 MG tablet    ZOFRAN    18 tablet    TAKE ONE TABLET BY MOUTH EVERY 6 HOURS AS NEEDED FOR NAUSEA OR VOMITING    Anxiety attack       order for DME     1 each    Blood pressure cuff    Elevated blood pressure reading without diagnosis of hypertension       propranolol 20 MG tablet    INDERAL    180 tablet    Take 1 tablet (20 mg) by mouth 2 times daily    Sinus tachycardia, Anxiety       QUEtiapine 200 MG tablet    SEROquel    90 tablet    Take 1 tablet (200 mg) by mouth At Bedtime    Anxiety, Psychophysiological insomnia       senna-docusate 8.6-50 MG per tablet     SENOKOT-S;PERICOLACE    30 tablet    Take 1-2 tablets by mouth daily as needed for constipation    S/P hysterectomy       sertraline 100 MG tablet    ZOLOFT    180 tablet    Take 2 tablets (200 mg) by mouth daily    Anxiety       * Notice:  This list has 2 medication(s) that are the same as other medications prescribed for you. Read the directions carefully, and ask your doctor or other care provider to review them with you.

## 2018-05-18 NOTE — PROGRESS NOTES
Subjective:  For the past week she has noted some lumps around the anus and vaginal introitus- it burns-    No history of HSV or warts. Not sexually active for over a year now.      The past medical history, social history, past surgical history and family history as shown below have been reviewed by me today.  Past Medical History:   Diagnosis Date     Abdominal pain, right lower quadrant 03/09/08    Admit. Discharged 03/10/08     Anxiety attack 7/31/2015     Dehydration      Gastric ulcer 7/31/2015     GERD (gastroesophageal reflux disease) 7/28/2010    Takes omeprazole and metoclopramide      Opioid dependence in remission (H) 8/2/2015     Other and unspecified ovarian cyst      Papanicolaou smear of cervix with low grade squamous intraepithelial lesion (LGSIL) 07/07/2017        Allergies   Allergen Reactions     Cafergot      12-            GI problems-     Seasonal Allergies      Sumatriptan      vomits after giving herself a shot     Compazine Anxiety     Droperidol Anxiety     Nubain [Nalbuphine Hcl] Anxiety     Prochlorperazine Palpitations     Uncontrolled movement     Current Outpatient Prescriptions   Medication Sig Dispense Refill     ACETAMINOPHEN PO Take 1,000 mg by mouth every 6 hours as needed for pain       buprenorphine-naloxone (SUBOXONE) 2-0.5 MG SUBL sublingual tablet Place 0.5 tablets under the tongue 2 times daily  0     cetirizine (ZYRTEC) 10 MG tablet TAKE ONE TABLET BY MOUTH EVERY EVENING 30 tablet 3     gabapentin (NEURONTIN) 300 MG capsule Take 1 capsule (300 mg) by mouth 3 times daily 30 capsule 1     lidocaine (LIDODERM) 5 % Patch Apply up to 3 patches to painful area at once for up to 12 h within a 24 h period.  Remove after 12 hours. 30 patch 0     LORazepam (ATIVAN) 1 MG tablet Take 1 tablet (1 mg) by mouth daily as needed for anxiety (or panic attack. Use sparingly. Do not take before driving.) 20 tablets to last one month. 20 tablet 3     omeprazole 20 MG tablet Take 1  tablet (20 mg) by mouth 2 times daily Take 30-60 minutes before a meal. 60 tablet 3     ondansetron (ZOFRAN) 4 MG tablet TAKE ONE TABLET BY MOUTH EVERY 6 HOURS AS NEEDED FOR NAUSEA OR VOMITING 18 tablet 10     order for DME Blood pressure cuff 1 each 0     propranolol (INDERAL) 20 MG tablet Take 1 tablet (20 mg) by mouth 2 times daily 180 tablet 1     QUEtiapine (SEROQUEL) 200 MG tablet Take 1 tablet (200 mg) by mouth At Bedtime 90 tablet 1     senna-docusate (SENOKOT-S;PERICOLACE) 8.6-50 MG per tablet Take 1-2 tablets by mouth daily as needed for constipation 30 tablet 3     sertraline (ZOLOFT) 100 MG tablet Take 2 tablets (200 mg) by mouth daily 180 tablet 1     Past Surgical History:   Procedure Laterality Date     BIOPSY CERVICAL, LOCAL EXCISION, SINGLE/MULTIPLE N/A 8/10/2017    Procedure: BIOPSY CERVICAL, LOCAL EXCISION, SINGLE/MULTIPLE;;  Surgeon: Michael Chandler MD;  Location: [facility] OR     COLPOSCOPY, BIOPSY, COMBINED N/A 8/10/2017    Procedure: COMBINED COLPOSCOPY, BIOPSY;  Colposcopy with Cervical Biopsies and Endometrial Biopsy, Exam with Ultrasound;  Surgeon: Michael Chandler MD;  Location: [facility] OR     ESOPHAGOSCOPY, GASTROSCOPY, DUODENOSCOPY (EGD), COMBINED N/A 4/17/2017    Procedure: COMBINED ESOPHAGOSCOPY, GASTROSCOPY, DUODENOSCOPY (EGD);  Surgeon: Ibrahima Esposito MD;  Location: [facility] GI     EXAM UNDER ANESTHESIA PELVIC N/A 8/10/2017    Procedure: EXAM UNDER ANESTHESIA PELVIC;;  Surgeon: Michael Chandler MD;  Location: [facility] OR     HC UGI ENDOSCOPY, SIMPLE EXAM  01/07/08     HYSTERECTOMY       LAPAROSCOPIC CHOLECYSTECTOMY N/A 2/2/2018    Procedure: LAPAROSCOPIC CHOLECYSTECTOMY;  Laparoscopic Cholecystectomy;  Surgeon: Tigre Lowry DO;  Location: [facility] OR     LAPAROSCOPIC HYSTERECTOMY TOTAL N/A 10/30/2017    Procedure: LAPAROSCOPIC HYSTERECTOMY TOTAL;  LAPAROSCOPIC HYSTERECTOMY TOTAL POSSIBLE SALPINGO-OOPHERECTOMY (BILATERAL);  Surgeon: Michael Chandler MD;   Location: [facility] OR     Social History     Social History     Marital status: Single     Spouse name: N/A     Number of children: N/A     Years of education: N/A     Social History Main Topics     Smoking status: Current Every Day Smoker     Packs/day: 0.25     Years: 20.00     Types: Cigarettes     Smokeless tobacco: Never Used      Comment: 5-6 cigs daily     Alcohol use Yes      Comment: occasional drinks     Drug use: No     Sexual activity: No     Other Topics Concern     Parent/Sibling W/ Cabg, Mi Or Angioplasty Before 65f 55m? No     Social History Narrative     Family History   Problem Relation Age of Onset     Depression Mother      Respiratory Mother      Chronic Obstructive Pulmonary Disease Mother      CEREBROVASCULAR DISEASE Father      Brain anyeurism     Adrenal Disorder Other      Chronic Obstructive Pulmonary Disease Other      Breast Cancer Cousin        ROS: A 12 point review of systems was done. Except for what is listed above in the HPI, the systems review is negative .      Objective: Vital signs: Blood pressure 128/84, pulse 76, temperature 98  F (36.7  C), temperature source Temporal, resp. rate 16, weight 167 lb (75.8 kg), SpO2 98 %, not currently breastfeeding.    Pelvic exam:My nurse Frida   was present to chaperone the exam.  The external genitalia appeared normal. Except there are some lumps/redness over the right labium and in the  Perineal area. Skin slightly raw but ulcers noted. Not really consistent with HSV or warts- just inflamed skin.  The vaginal vault was without bleeding  or   discharge   or odor.     No vaginal support defects were noted,    Bimanual exam revealed  No adnexal masses were felt.            Assessment/Plan:    1.  ? Warts in the labial/perineal area-   They are sore though not real consistent with HSV- sx for a week already- I doubt that valtrex will help this late if this is HSV.    2. I received her informed consent and anesth with 1% xylocaine w epi and took a 3  mm punch of the lumps- hyfrecated the base and placed a 4-0 vcryl suture- rechekc in 1 week-    3. See rx for Diflucan in case this is yeast and also for Xylocaine ointment for the skin discomfort until we have a pathologic diagnosis      RJ Chandler MD

## 2018-05-22 LAB — COPATH REPORT: NORMAL

## 2018-05-25 ENCOUNTER — OFFICE VISIT (OUTPATIENT)
Dept: FAMILY MEDICINE | Facility: CLINIC | Age: 43
End: 2018-05-25
Payer: COMMERCIAL

## 2018-05-25 VITALS
TEMPERATURE: 97.4 F | SYSTOLIC BLOOD PRESSURE: 122 MMHG | WEIGHT: 168 LBS | OXYGEN SATURATION: 97 % | DIASTOLIC BLOOD PRESSURE: 80 MMHG | BODY MASS INDEX: 27.96 KG/M2 | HEART RATE: 90 BPM | RESPIRATION RATE: 16 BRPM

## 2018-05-25 DIAGNOSIS — N76.0 VAGINITIS AND VULVOVAGINITIS: Primary | ICD-10-CM

## 2018-05-25 DIAGNOSIS — L30.9 ECZEMA, UNSPECIFIED TYPE: ICD-10-CM

## 2018-05-25 PROCEDURE — 99214 OFFICE O/P EST MOD 30 MIN: CPT | Performed by: OBSTETRICS & GYNECOLOGY

## 2018-05-25 ASSESSMENT — PAIN SCALES - GENERAL: PAINLEVEL: MODERATE PAIN (4)

## 2018-05-25 NOTE — MR AVS SNAPSHOT
After Visit Summary   5/25/2018    Radha Beckwith    MRN: [id number]           Patient Information     Date Of Birth          1975        Visit Information        Provider Department      5/25/2018 11:20 AM Michael Chandler MD [facility]        Today's Diagnoses     Vaginitis and vulvovaginitis    -  1    Eczema, unspecified type           Follow-ups after your visit        Who to contact     If you have questions or need follow up information about today's clinic visit or your schedule please contact [facility] directly at [phone].  Normal or non-critical lab and imaging results will be communicated to you by MyChart, letter or phone within 4 business days after the clinic has received the results. If you do not hear from us within 7 days, please contact the clinic through MyChart or phone. If you have a critical or abnormal lab result, we will notify you by phone as soon as possible.  Submit refill requests through [employer] or call your pharmacy and they will forward the refill request to us. Please allow 3 business days for your refill to be completed.          Additional Information About Your Visit        Care EveryWhere ID     This is your Care EveryWhere ID. This could be used by other organizations to access your [facility] medical records  [id number]        Your Vitals Were     Pulse Temperature Respirations Last Period Pulse Oximetry Breastfeeding?    90 97.4  F (36.3  C) (Temporal) 16 (LMP Unknown) 97% No    BMI (Body Mass Index)                   27.96 kg/m2            Blood Pressure from Last 3 Encounters:   05/25/18 122/80   05/18/18 128/84   04/12/18 124/78    Weight from Last 3 Encounters:   05/25/18 168 lb (76.2 kg)   05/18/18 167 lb (75.8 kg)   04/12/18 164 lb (74.4 kg)              Today, you had the following     No orders found for display       Primary Care Provider Office Phone # Fax #    JEANNIE Thomas CNP  [phone]       [address] [facility] [address]        Equal Access to Services     DIONNE GALLARDO : Calderon aad ku haddharmesh Holt, carrie arce, luan monroy, yadi page laDaliayeni cherise. So [facility] [phone].    ATENCIÓN: Si habla español, tiene a rueda disposición servicios gratuitos de asistencia lingüística. Llame al [phone].    We comply with applicable federal civil rights laws and [place] laws. We do not discriminate on the basis of race, color, national origin, age, disability, sex, sexual orientation, or gender identity.            Thank you!     Thank you for choosing [facility]  for your care. Our goal is always to provide you with excellent care. Hearing back from our patients is one way we can continue to improve our services. Please take a few minutes to complete the written survey that you may receive in the mail after your visit with us. Thank you!             Your Updated Medication List - Protect others around you: Learn how to safely use, store and throw away your medicines at www.disposemymeds.org.          This list is accurate as of 5/25/18 11:53 AM.  Always use your most recent med list.                   Brand Name Dispense Instructions for use Diagnosis    ACETAMINOPHEN PO      Take 1,000 mg by mouth every 6 hours as needed for pain        buprenorphine-naloxone 2-0.5 MG Subl sublingual tablet    SUBOXONE     Place 0.5 tablets under the tongue 2 times daily    Opioid dependence in remission (H)       cetirizine 10 MG tablet    zyrTEC    30 tablet    TAKE ONE TABLET BY MOUTH EVERY EVENING    Seasonal allergic rhinitis, unspecified chronicity, unspecified trigger       fluconazole 150 MG tablet    DIFLUCAN    4 tablet    Take 1 tablet (150 mg) by mouth every 3 days    Vaginal lesion       gabapentin 300 MG capsule    NEURONTIN    30 capsule    Take 1 capsule (300 mg) by mouth 3 times daily        * lidocaine 5 % Patch     LIDODERM    30 patch    Apply up to 3 patches to painful area at once for up to 12 h within a 24 h period.  Remove after 12 hours.        * lidocaine 5 % ointment    XYLOCAINE    50 g    Apply topically 3 times daily    Vaginal lesion       LORazepam 1 MG tablet    ATIVAN    20 tablet    Take 1 tablet (1 mg) by mouth daily as needed for anxiety (or panic attack. Use sparingly. Do not take before driving.) 20 tablets to last one month.    Anxiety       omeprazole 20 MG tablet     60 tablet    Take 1 tablet (20 mg) by mouth 2 times daily Take 30-60 minutes before a meal.    Gastroesophageal reflux disease, esophagitis presence not specified       ondansetron 4 MG tablet    ZOFRAN    18 tablet    TAKE ONE TABLET BY MOUTH EVERY 6 HOURS AS NEEDED FOR NAUSEA OR VOMITING    Anxiety attack       order for DME     1 each    Blood pressure cuff    Elevated blood pressure reading without diagnosis of hypertension       propranolol 20 MG tablet    INDERAL    180 tablet    Take 1 tablet (20 mg) by mouth 2 times daily    Sinus tachycardia, Anxiety       QUEtiapine 200 MG tablet    SEROquel    90 tablet    Take 1 tablet (200 mg) by mouth At Bedtime    Anxiety, Psychophysiological insomnia       senna-docusate 8.6-50 MG per tablet    SENOKOT-S;PERICOLACE    30 tablet    Take 1-2 tablets by mouth daily as needed for constipation    S/P hysterectomy       sertraline 100 MG tablet    ZOLOFT    180 tablet    Take 2 tablets (200 mg) by mouth daily    Anxiety       * Notice:  This list has 2 medication(s) that are the same as other medications prescribed for you. Read the directions carefully, and ask your doctor or other care provider to review them with you.

## 2018-05-25 NOTE — PROGRESS NOTES
Subjective:  She has 2 concerns:  1. She has a rash over her arms and legs- especially the forearms and hands- comes and goes- has pinpoint papules that are itchy and sometimes bleed if she  scratches them.      2. She was seen last week for vulvar/vaginal itching and I biopsied a lesion- it bleeds from time to time.      The past medical history, social history, past surgical history and family history as shown below have been reviewed by me today.  Past Medical History:   Diagnosis Date     Abdominal pain, right lower quadrant 03/09/08    Admit. Discharged 03/10/08     Anxiety attack 7/31/2015     Dehydration      Gastric ulcer 7/31/2015     GERD (gastroesophageal reflux disease) 7/28/2010    Takes omeprazole and metoclopramide      Opioid dependence in remission (H) 8/2/2015     Other and unspecified ovarian cyst      Papanicolaou smear of cervix with low grade squamous intraepithelial lesion (LGSIL) 07/07/2017        Allergies   Allergen Reactions     Cafergot      12-            GI problems-     Seasonal Allergies      Sumatriptan      vomits after giving herself a shot     Compazine Anxiety     Droperidol Anxiety     Nubain [Nalbuphine Hcl] Anxiety     Prochlorperazine Palpitations     Uncontrolled movement     Current Outpatient Prescriptions   Medication Sig Dispense Refill     ACETAMINOPHEN PO Take 1,000 mg by mouth every 6 hours as needed for pain       buprenorphine-naloxone (SUBOXONE) 2-0.5 MG SUBL sublingual tablet Place 0.5 tablets under the tongue 2 times daily  0     cetirizine (ZYRTEC) 10 MG tablet TAKE ONE TABLET BY MOUTH EVERY EVENING 30 tablet 3     fluconazole (DIFLUCAN) 150 MG tablet Take 1 tablet (150 mg) by mouth every 3 days 4 tablet 0     gabapentin (NEURONTIN) 300 MG capsule Take 1 capsule (300 mg) by mouth 3 times daily 30 capsule 1     lidocaine (LIDODERM) 5 % Patch Apply up to 3 patches to painful area at once for up to 12 h within a 24 h period.  Remove after 12 hours. 30 patch  0     lidocaine (XYLOCAINE) 5 % ointment Apply topically 3 times daily 50 g 1     LORazepam (ATIVAN) 1 MG tablet Take 1 tablet (1 mg) by mouth daily as needed for anxiety (or panic attack. Use sparingly. Do not take before driving.) 20 tablets to last one month. 20 tablet 3     omeprazole 20 MG tablet Take 1 tablet (20 mg) by mouth 2 times daily Take 30-60 minutes before a meal. 60 tablet 3     ondansetron (ZOFRAN) 4 MG tablet TAKE ONE TABLET BY MOUTH EVERY 6 HOURS AS NEEDED FOR NAUSEA OR VOMITING 18 tablet 10     order for DME Blood pressure cuff 1 each 0     propranolol (INDERAL) 20 MG tablet Take 1 tablet (20 mg) by mouth 2 times daily 180 tablet 1     QUEtiapine (SEROQUEL) 200 MG tablet Take 1 tablet (200 mg) by mouth At Bedtime 90 tablet 1     senna-docusate (SENOKOT-S;PERICOLACE) 8.6-50 MG per tablet Take 1-2 tablets by mouth daily as needed for constipation 30 tablet 3     sertraline (ZOLOFT) 100 MG tablet Take 2 tablets (200 mg) by mouth daily 180 tablet 1     Past Surgical History:   Procedure Laterality Date     BIOPSY CERVICAL, LOCAL EXCISION, SINGLE/MULTIPLE N/A 8/10/2017    Procedure: BIOPSY CERVICAL, LOCAL EXCISION, SINGLE/MULTIPLE;;  Surgeon: Michael Chandler MD;  [facility]     COLPOSCOPY, BIOPSY, COMBINED N/A 8/10/2017    Procedure: COMBINED COLPOSCOPY, BIOPSY;  Colposcopy with Cervical Biopsies and Endometrial Biopsy, Exam with Ultrasound;  Surgeon: Michael Chandler MD;  [facility]     ESOPHAGOSCOPY, GASTROSCOPY, DUODENOSCOPY (EGD), COMBINED N/A 4/17/2017    Procedure: COMBINED ESOPHAGOSCOPY, GASTROSCOPY, DUODENOSCOPY (EGD);  Surgeon: Ibrahima Esposito MD;  [facility]     EXAM UNDER ANESTHESIA PELVIC N/A 8/10/2017    Procedure: EXAM UNDER ANESTHESIA PELVIC;;  Surgeon: Michael Chandler MD;  [facility]     [facility] UGI ENDOSCOPY, SIMPLE EXAM  01/07/08     HYSTERECTOMY       LAPAROSCOPIC CHOLECYSTECTOMY N/A 2/2/2018    Procedure: LAPAROSCOPIC  CHOLECYSTECTOMY;  Laparoscopic Cholecystectomy;  Surgeon: Tigre Lowry DO;  [facility]     LAPAROSCOPIC HYSTERECTOMY TOTAL N/A 10/30/2017    Procedure: LAPAROSCOPIC HYSTERECTOMY TOTAL;  LAPAROSCOPIC HYSTERECTOMY TOTAL POSSIBLE SALPINGO-OOPHERECTOMY (BILATERAL);  Surgeon: Michael Chandler MD;  [facility]     Social History     Social History     Marital status: Single     Spouse name: N/A     Number of children: N/A     Years of education: N/A     Social History Main Topics     Smoking status: Current Every Day Smoker     Packs/day: 0.25     Years: 20.00     Types: Cigarettes     Smokeless tobacco: Never Used      Comment: 5-6 cigs daily     Alcohol use Yes      Comment: occasional drinks     Drug use: No     Sexual activity: No     Other Topics Concern     Parent/Sibling W/ Cabg, Mi Or Angioplasty Before 65f 55m? No     Social History Narrative     Family History   Problem Relation Age of Onset     Depression Mother      Respiratory Mother      Chronic Obstructive Pulmonary Disease Mother      CEREBROVASCULAR DISEASE Father      Brain anyeurism     Adrenal Disorder Other      Chronic Obstructive Pulmonary Disease Other      Breast Cancer Cousin        ROS: A 12 point review of systems was done. Except for what is listed above in the HPI, the systems review is negative .      Objective: Vital signs: Blood pressure 122/80, pulse 90, temperature 97.4  F (36.3  C), temperature source Temporal, resp. rate 16, weight 168 lb (76.2 kg), SpO2 97 %, not currently breastfeeding.    Exam of the arms and legs reveals dry scaly skin very consistent with eczema- probably related to frequent hand washing. She has to wash a lot at work.      Exam of pelvic area done with darlene present-  The labia look much better today- less inflamed- no redness or bumps as I saw last week- the bx site is healing well- I cuaterized it with silver nitrate because she said it was bleeding- although not today.    The  biopsy report didn't suggest warts or HSV, but suggested contact dermatitis-        Assessment/Plan:A total of 25 minutes were spent face-to-face with this patient during today's consultation, with more than 50% of that time devoted to conversation and counseling about the management decisions.      1. Contact dermatitis /vulvovaginitis- probably from sweat or possibly detergent in her underwear- discussed this- it has markedly improved on today's exam    2. Eczema of hands/legs-and arms-advised moiosturiizing frequently and also advised derm eval- associated skin care number given-     3. rechekc prn recurrent sx.      R MD Ramesh

## 2018-06-06 DIAGNOSIS — R00.0 SINUS TACHYCARDIA: ICD-10-CM

## 2018-06-06 DIAGNOSIS — F41.9 ANXIETY: ICD-10-CM

## 2018-06-07 RX ORDER — PROPRANOLOL HYDROCHLORIDE 20 MG/1
TABLET ORAL
Qty: 180 TABLET | Refills: 0 | Status: SHIPPED | OUTPATIENT
Start: 2018-06-07 | End: 2018-10-23

## 2018-06-07 NOTE — TELEPHONE ENCOUNTER
Propranolol-  Routing refill request to provider for review/approval because:  A break in medication: Should have been out 04/16/2017    Cuca Yang RN, BSN

## 2018-06-27 ENCOUNTER — TELEPHONE (OUTPATIENT)
Dept: FAMILY MEDICINE | Facility: OTHER | Age: 43
End: 2018-06-27

## 2018-06-27 DIAGNOSIS — F41.9 ANXIETY: ICD-10-CM

## 2018-06-28 NOTE — TELEPHONE ENCOUNTER
Lorazepam    Routing refill request to provider for review/approval because:  Drug not on the [facility] refill protocol     Debra Ramon RN, BSN

## 2018-06-29 RX ORDER — LORAZEPAM 1 MG/1
1 TABLET ORAL DAILY PRN
Qty: 20 TABLET | Refills: 0 | Status: SHIPPED | OUTPATIENT
Start: 2018-06-29 | End: 2018-08-03

## 2018-07-13 DIAGNOSIS — K21.9 GASTROESOPHAGEAL REFLUX DISEASE, ESOPHAGITIS PRESENCE NOT SPECIFIED: ICD-10-CM

## 2018-07-13 NOTE — TELEPHONE ENCOUNTER
Metoclopramide-  Routing refill request to provider for review/approval because:  Drug not active on patient's medication list  Last OV 05/25/2018  Marked therapy completed on 03/30/2017.  Cuca Yang RN, BSN

## 2018-07-17 RX ORDER — METOCLOPRAMIDE 10 MG/1
TABLET ORAL
Qty: 90 TABLET | Refills: 0 | Status: SHIPPED | OUTPATIENT
Start: 2018-07-17 | End: 2018-11-20

## 2018-08-03 ENCOUNTER — TELEPHONE (OUTPATIENT)
Dept: FAMILY MEDICINE | Facility: OTHER | Age: 43
End: 2018-08-03

## 2018-08-03 DIAGNOSIS — F41.9 ANXIETY: ICD-10-CM

## 2018-08-03 RX ORDER — LORAZEPAM 1 MG/1
1 TABLET ORAL DAILY PRN
Qty: 20 TABLET | Refills: 0 | Status: SHIPPED | OUTPATIENT
Start: 2018-08-03 | End: 2018-09-05

## 2018-08-03 NOTE — TELEPHONE ENCOUNTER
Reason for Call:  Medication or medication refill:    Do you use a [facility]?  Name of the pharmacy and phone number for the current request:  [facility] - [phone]    Name of the medication requested: Lorazepam    Other request: Patient will be out after today, can you do this ASAP?    Can we leave a detailed message on this number? NO    Phone number patient can be reached at: [phone]    Best Time:     Call taken on 8/3/2018 at 2:40 PM by Doretha Keith

## 2018-08-03 NOTE — TELEPHONE ENCOUNTER
Provider please review and advise. Rx is pending and pharmacy entered.   Lorazepam      Last Written Prescription Date:  6/29/18  Last Fill Quantity: 20,   # refills: 0  Last Office Visit: 5/25/18  Future Office visit:       Routing refill request to provider for review/approval because:  Drug not on the [facility], [facility] or [facility] refill protocol or controlled substance     May Silva CMA ([facility])

## 2018-08-12 ENCOUNTER — HEALTH MAINTENANCE LETTER (OUTPATIENT)
Age: 43
End: 2018-08-12

## 2018-08-14 ENCOUNTER — TELEPHONE (OUTPATIENT)
Dept: FAMILY MEDICINE | Facility: OTHER | Age: 43
End: 2018-08-14

## 2018-08-14 NOTE — TELEPHONE ENCOUNTER
Panel Management Review      Patient has the following on her problem list: None      Composite cancer screening  Chart review shows that this patient is due/due soon for the following Pap Smear  Summary:    Patient is due/failing the following:   LDL, PAP and PHYSICAL    Action needed:   Patient needs office visit for Physical. and Patient needs fasting lab only appointment    Type of outreach:    Phone, spoke to patient.  Patient declined to make appointment, states she will call back to schedule    Questions for provider review:    None                                                                                                                                    Georgiana Elizabeth CMA     Chart routed to Care Team .

## 2018-09-05 DIAGNOSIS — F41.9 ANXIETY: ICD-10-CM

## 2018-09-06 RX ORDER — LORAZEPAM 1 MG/1
1 TABLET ORAL DAILY PRN
Qty: 20 TABLET | Refills: 0 | Status: SHIPPED | OUTPATIENT
Start: 2018-09-06 | End: 2018-10-05

## 2018-09-06 NOTE — TELEPHONE ENCOUNTER
Rx has been walked to  [facility]  Patient has been notified   Closing encounter  Jahaira Rivers RT (R)

## 2018-09-06 NOTE — TELEPHONE ENCOUNTER
Ativan  Routing refill request to provider for review/approval because:  Drug not on the [facility] refill protocol     Christiana Stevens, RN, BSN

## 2018-09-06 NOTE — TELEPHONE ENCOUNTER
Reviewed MN  and no concerns for misuse. Patient is using this as directed. If one of my colleagues is willing to sign this I am okay with a refill of 20 tablets. She is due for an office visit for recheck on anxiety before the next refill after this so please call her to notify.  Thanks,  Marisa Story, CNP

## 2018-09-14 ENCOUNTER — TELEPHONE (OUTPATIENT)
Dept: FAMILY MEDICINE | Facility: OTHER | Age: 43
End: 2018-09-14

## 2018-09-14 NOTE — TELEPHONE ENCOUNTER
Spoke with EM and SD.  She should be seeing OB/GYN.    Has been tying lidocaine cream and that calms it a little. Other creams make it worse.    Advised if pain is worsening she needs to be seen in ED.    Next 5 appointments (look out 90 days)     Sep 18, 2018  3:00 PM CDT   Office Visit with Michelle Gunter MD   [facility]    [address] [address] [facility] [address] [phone]                Erick Nicolas RN, BSN

## 2018-09-14 NOTE — TELEPHONE ENCOUNTER
Reason for call:  Patient reporting a symptom    Symptom or request: rash around anus and inflamed, it is bleeding, also has bumps in her vagina, when she puts cream down there, but she can feel a slight mesh inside?     Duration (how long have symptoms been present): ongoing    Have you been treated for this before? No    Additional comments: none    Phone Number patient can be reached at:  Home number on file [phone] (home)    Best Time:  any    Can we leave a detailed message on this number:  YES    Call taken on 9/14/2018 at 4:00 PM by Rosalinda Harper

## 2018-09-24 DIAGNOSIS — F41.9 ANXIETY: ICD-10-CM

## 2018-09-26 RX ORDER — SERTRALINE HYDROCHLORIDE 100 MG/1
TABLET, FILM COATED ORAL
Qty: 180 TABLET | Refills: 1 | Status: SHIPPED | OUTPATIENT
Start: 2018-09-26 | End: 2019-06-03

## 2018-09-26 NOTE — TELEPHONE ENCOUNTER
Sertraline    Routing refill request to provider for review/approval because:  A break in medication    Debra Ramon RN, BSN

## 2018-10-05 DIAGNOSIS — F41.9 ANXIETY: ICD-10-CM

## 2018-10-11 DIAGNOSIS — F51.04 PSYCHOPHYSIOLOGICAL INSOMNIA: ICD-10-CM

## 2018-10-11 DIAGNOSIS — F41.9 ANXIETY: ICD-10-CM

## 2018-10-12 RX ORDER — LORAZEPAM 1 MG/1
1 TABLET ORAL DAILY PRN
Qty: 20 TABLET | Refills: 0 | Status: SHIPPED | OUTPATIENT
Start: 2018-10-12 | End: 2018-11-20

## 2018-10-15 RX ORDER — QUETIAPINE FUMARATE 200 MG/1
TABLET, FILM COATED ORAL
Qty: 90 TABLET | Refills: 0 | Status: SHIPPED | OUTPATIENT
Start: 2018-10-15 | End: 2019-01-11

## 2018-10-15 NOTE — TELEPHONE ENCOUNTER
"Requested Prescriptions   Pending Prescriptions Disp Refills     QUEtiapine (SEROQUEL) 200 MG tablet [Pharmacy Med Name: QUETIAPINE FUMARATE 200MG TABS] 90 tablet 0     Sig: TAKE ONE TABLET BY MOUTH EVERY NIGHT AT BEDTIME    Antipsychotic Medications Failed    10/12/2018  4:10 PM       Failed - Lipid panel on file within the past 12 months    Recent Labs   Lab Test  08/10/17   1228   CHOL  287*   TRIG  608*   HDL  30*   LDL  Cannot estimate LDL when triglyceride exceeds 400 mg/dL  164*   NHDL  257*          Passed - Blood pressure under 140/90 in past 12 months    BP Readings from Last 3 Encounters:   05/25/18 122/80   05/18/18 128/84   04/12/18 124/78          Passed - Patient is 12 years of age or older       Passed - CBC on file in past 12 months    Recent Labs   Lab Test  01/25/18   1440   WBC  10.4   RBC  4.28   HGB  13.5   HCT  41.0   PLT  190     For [facility] ONLY: ELHL107 = WBC, AQJF201 = RBC         Passed - Heart Rate on file within past 12 months    Pulse Readings from Last 3 Encounters:   05/25/18 90   05/18/18 76   04/12/18 89          Passed - A1c or Glucose on file in past 12 months    Recent Labs   Lab Test  04/12/18   1218   GLC  114*     Please review patients last 3 weights. If a weight gain of >10 lbs exists, you may refill the prescription once after instructing the patient to schedule an appointment within the next 30 days.    Wt Readings from Last 3 Encounters:   05/25/18 168 lb (76.2 kg)   05/18/18 167 lb (75.8 kg)   04/12/18 164 lb (74.4 kg)          Passed - Patient is not pregnant       Passed - No positve pregnancy test on file in past 12 months       Passed - Recent (6 mo) or future (30 days) visit within the authorizing provider's specialty    Patient had office visit in the last 6 months or has a visit in the next 30 days with authorizing provider or within the authorizing provider's specialty.  See \"Patient Info\" tab in inbasket, or \"Choose Columns\" in Meds & Orders section of the refill " encounter.            Over due for fasting lipids.  Last ov 05/25/2018  Medication is being filled for 1 time refill only due to:  Patient needs to be seen because due for OV and fasting labs..   Please call and help schedule.  Erick Nicolas, RN, BSN

## 2018-10-23 DIAGNOSIS — R00.0 SINUS TACHYCARDIA: ICD-10-CM

## 2018-10-23 DIAGNOSIS — F41.9 ANXIETY: ICD-10-CM

## 2018-10-24 RX ORDER — PROPRANOLOL HYDROCHLORIDE 20 MG/1
TABLET ORAL
Qty: 180 TABLET | Refills: 0 | Status: SHIPPED | OUTPATIENT
Start: 2018-10-24 | End: 2019-06-03

## 2018-10-24 NOTE — TELEPHONE ENCOUNTER
Spoke with pt and gave her information below. Pt states she doesn't have her schedule for next week yet but will call back to schedule once she is able to view her schedule.    Susan Craven MA

## 2018-10-24 NOTE — TELEPHONE ENCOUNTER
Propranolol    BP Readings from Last 3 Encounters:   05/25/18 122/80   05/18/18 128/84   04/12/18 124/78     Medication is being filled for 1 time refill only due to:  Patient needs to be seen because HTN F/U.     Please help schedule    Debra Ramon, RN, BSN

## 2018-11-19 DIAGNOSIS — K21.9 GASTROESOPHAGEAL REFLUX DISEASE, ESOPHAGITIS PRESENCE NOT SPECIFIED: ICD-10-CM

## 2018-11-19 DIAGNOSIS — F41.0 ANXIETY ATTACK: ICD-10-CM

## 2018-11-19 RX ORDER — METOCLOPRAMIDE 10 MG/1
TABLET ORAL
Qty: 90 TABLET | Refills: 0 | Status: CANCELLED | OUTPATIENT
Start: 2018-11-19

## 2018-11-19 RX ORDER — ONDANSETRON 4 MG/1
TABLET, FILM COATED ORAL
Qty: 18 TABLET | Refills: 10 | Status: CANCELLED | OUTPATIENT
Start: 2018-11-19

## 2018-11-19 NOTE — PROGRESS NOTES
"  SUBJECTIVE:   Radha Beckwith is a 43 year old female who presents to clinic today for the following health issues:      History of Present Illness     Depression & Anxiety Follow-up:     Depression/Anxiety:  Depression & Anxiety    Status since last visit::  Worsened    Other associated symptoms of depression and anxiety::  YES    Significant life event::  YES    Current substance use::  None       Today's PHQ-9         PHQ-9 Total Score:     (P) 12   PHQ-9 Q9 Suicidal ideation:   (P) Not at all   Thoughts of suicide or self harm:      Self-harm Plan:        Self-harm Action:          Safety concerns for self or others:       BO-7 Total Score: (P) 13    Diet:  Regular (no restrictions)  Taking medications regularly:  Yes  Additional concerns today:  Yes    Answers for HPI/ROS submitted by the patient on 11/20/2018   PHQ-2 Score: 4  If you checked off any problems, how difficult have these problems made it for you to do your work, take care of things at home, or get along with other people?: Very difficult  PHQ9 TOTAL SCORE: 12  BO 7 TOTAL SCORE: 13    Appointment that is longer to follow-up as she is here with her mom who is on oxygen and her tank will run out shortly.    Today she would like to get a refill of Lorazepam.  Her anxiety has worsened recently as her mom's health continues to fail.  She provides She provides care for care for her mom and lives with her.  She works part-time at an [employer] store and her mom will call her at least once a day with some concern that needs to be addressed.  Sometimes she has to leave work to go home to care for her other times she has to miss work entirely to care for her mom.  Her mom has been unkind recently and has been \"nit when-picking\" some of the household tasks are not done.  Her mom used to help out with things around the house but as her health has failed she no longer helps out.  The only things her mom is able to do for herself is take her inhaler and " "use the restroom.  When she is done with work she has to go home immediately or her mom will call her and ask her where she is.  When she is at home she will go into the basement where her room is and consistently her mom will call 15 minutes later to ask what is she doing and why is she not spending time with her.  Her brother has no interest in being involved in her mom's care as \"he does not like her\".  There are no other family members to help her out.  She does not want to place her in a long-term care facility as she states her mom \"would never go to one\".  She has not been sleeping well at night and awakens every hour.  She has been experiencing restless leg syndrome and also 1 of her arms becomes restless and jumpy.  She has been on ropinirole in the past and this has been effective.  She would like to restart this medication.    Problem list and histories reviewed & adjusted, as indicated.  Additional history: as documented    She experiences nausea on a daily basis.  She is not sure if it is because of her reflux or anxiety.  She is planning to have an endoscopy and colonoscopy due to chronic reflux.  This was ordered by Dr. Lopez.    Patient Active Problem List   Diagnosis     GERD (gastroesophageal reflux disease)     Restless legs     Anxiety     Ingrowing nail     Hyperlipidemia LDL goal <100     Hypokalemia     Atypical chest pain     Tobacco abuse     Anxiety attack     Opioid dependence in remission (H)     Papanicolaou smear of cervix with low grade squamous intraepithelial lesion (LGSIL)     S/P hysterectomy     Psychophysiological insomnia     Chronic bilateral low back pain without sciatica     Past Surgical History:   Procedure Laterality Date     BIOPSY CERVICAL, LOCAL EXCISION, SINGLE/MULTIPLE N/A 8/10/2017    Procedure: BIOPSY CERVICAL, LOCAL EXCISION, SINGLE/MULTIPLE;;  Surgeon: Michael Chandler MD;  [facility]     COLPOSCOPY, BIOPSY, COMBINED N/A 8/10/2017    Procedure: " COMBINED COLPOSCOPY, BIOPSY;  Colposcopy with Cervical Biopsies and Endometrial Biopsy, Exam with Ultrasound;  Surgeon: Michael Chandlre MD;  [facility]     ESOPHAGOSCOPY, GASTROSCOPY, DUODENOSCOPY (EGD), COMBINED N/A 4/17/2017    Procedure: COMBINED ESOPHAGOSCOPY, GASTROSCOPY, DUODENOSCOPY (EGD);  Surgeon: Ibrahima Esposito MD;  [facility]     EXAM UNDER ANESTHESIA PELVIC N/A 8/10/2017    Procedure: EXAM UNDER ANESTHESIA PELVIC;;  Surgeon: Michael Chandler MD;  [facility]     [facility] UGI ENDOSCOPY, SIMPLE EXAM  01/07/08     HYSTERECTOMY       LAPAROSCOPIC CHOLECYSTECTOMY N/A 2/2/2018    Procedure: LAPAROSCOPIC CHOLECYSTECTOMY;  Laparoscopic Cholecystectomy;  Surgeon: Tigre Lowry DO;  [facility]     LAPAROSCOPIC HYSTERECTOMY TOTAL N/A 10/30/2017    Procedure: LAPAROSCOPIC HYSTERECTOMY TOTAL;  LAPAROSCOPIC HYSTERECTOMY TOTAL POSSIBLE SALPINGO-OOPHERECTOMY (BILATERAL);  Surgeon: Michael Chandler MD;  [facility]       Social History   Substance Use Topics     Smoking status: Current Every Day Smoker     Packs/day: 0.25     Years: 20.00     Types: Cigarettes     Smokeless tobacco: Never Used      Comment: 5-6 cigs daily     Alcohol use Yes      Comment: occasional drinks     Family History   Problem Relation Age of Onset     Depression Mother      Respiratory Mother      Chronic Obstructive Pulmonary Disease Mother      Cerebrovascular Disease Father      Brain anyeurism     Adrenal Disorder Other      Chronic Obstructive Pulmonary Disease Other      Breast Cancer Cousin          Current Outpatient Prescriptions   Medication Sig Dispense Refill     ACETAMINOPHEN PO Take 1,000 mg by mouth every 6 hours as needed for pain       buprenorphine-naloxone (SUBOXONE) 2-0.5 MG SUBL sublingual tablet Place 0.5 tablets under the tongue 2 times daily  0     cetirizine (ZYRTEC) 10 MG tablet TAKE ONE TABLET BY MOUTH EVERY EVENING 30 tablet 3     LORazepam (ATIVAN) 1 MG  "tablet Take 1 tablet (1 mg) by mouth daily as needed for anxiety (or panic attack. Use sparingly. Do not take before driving.) 30 tablet 1     metoclopramide (REGLAN) 10 MG tablet TAKE ONE TABLET BY MOUTH THREE TIMES A DAY AS NEEDED 90 tablet 0     omeprazole 20 MG tablet Take 1 tablet (20 mg) by mouth 2 times daily Take 30-60 minutes before a meal. 60 tablet 3     ondansetron (ZOFRAN) 4 MG tablet TAKE ONE TABLET BY MOUTH every 12 hours AS NEEDED FOR NAUSEA OR VOMITING 18 tablet 10     order for DME Blood pressure cuff 1 each 0     propranolol (INDERAL) 20 MG tablet TAKE ONE TABLET BY MOUTH TWICE A  tablet 0     QUEtiapine (SEROQUEL) 200 MG tablet TAKE ONE TABLET BY MOUTH EVERY NIGHT AT BEDTIME 90 tablet 0     rOPINIRole (REQUIP) 0.25 MG tablet Take 1 tablet (0.25 mg) by mouth At Bedtime 30 tablet 0     sertraline (ZOLOFT) 100 MG tablet TAKE TWO TABLETS BY MOUTH EVERY  tablet 1     Allergies   Allergen Reactions     Cafergot      12-            GI problems-     Seasonal Allergies      Sumatriptan      vomits after giving herself a shot     Compazine Anxiety     Droperidol Anxiety     Nubain [Nalbuphine Hcl] Anxiety     Prochlorperazine Palpitations     Uncontrolled movement     BP Readings from Last 3 Encounters:   11/20/18 124/86   05/25/18 122/80   05/18/18 128/84    Wt Readings from Last 3 Encounters:   05/25/18 168 lb (76.2 kg)   05/18/18 167 lb (75.8 kg)   04/12/18 164 lb (74.4 kg)                  Labs reviewed in EPIC    ROS:  Constitutional, HEENT, cardiovascular, pulmonary, GI, , musculoskeletal, neuro, skin, endocrine and psych systems are negative, except as otherwise noted.    OBJECTIVE:     /86  Pulse 76  Temp 97.1  F (36.2  C) (Temporal)  Resp 12  Ht 5' 5\" (1.651 m)  LMP  (LMP Unknown)  BMI 27.96 kg/m2  Body mass index is 27.96 kg/(m^2).  GENERAL: healthy, alert and no distress  EYES: Eyes grossly normal to inspection, PERRL and conjunctivae and sclerae normal  NECK: " no adenopathy, no asymmetry, masses, or scars and thyroid normal to palpation  RESP: lungs clear to auscultation - no rales, rhonchi or wheezes  CV: regular rate and rhythm, normal S1 S2, no S3 or S4, no murmur, click or rub, no peripheral edema and peripheral pulses strong  ABDOMEN: soft, nontender, no hepatosplenomegaly, no masses and bowel sounds normal  MS: no gross musculoskeletal defects noted, no edema  SKIN: no suspicious lesions or rashes  NEURO: Normal strength and tone, mentation intact and speech normal  PSYCH: mentation appears normal, anxious, fatigued, judgement and insight intact and appearance well groomed    Diagnostic Test Results:  none     ASSESSMENT/PLAN:     1. Anxiety  Worsened due to caregiver burden caring for her mother who has failing health.  Refill of Lorazepam granted.  Encouraged her to use this sparingly.  - LORazepam (ATIVAN) 1 MG tablet; Take 1 tablet (1 mg) by mouth daily as needed for anxiety (or panic attack. Use sparingly. Do not take before driving.)  Dispense: 30 tablet; Refill: 1    2. Restless legs syndrome (RLS)  Worsened. Restart ropinirole.  - rOPINIRole (REQUIP) 0.25 MG tablet; Take 1 tablet (0.25 mg) by mouth At Bedtime  Dispense: 30 tablet; Refill: 0    3. Anxiety attack  Ongoing. Refills granted.  - ondansetron (ZOFRAN) 4 MG tablet; TAKE ONE TABLET BY MOUTH every 12 hours AS NEEDED FOR NAUSEA OR VOMITING  Dispense: 18 tablet; Refill: 10    4. Gastroesophageal reflux disease, esophagitis presence not specified  Ongoing. Plan for EGD and colonoscopy with Dr. Chandler. Refills granted.   - metoclopramide (REGLAN) 10 MG tablet; TAKE ONE TABLET BY MOUTH THREE TIMES A DAY AS NEEDED  Dispense: 90 tablet; Refill: 0    5. Need for prophylactic vaccination and inoculation against influenza  - FLU VACCINE, SPLIT VIRUS, IM (QUADRIVALENT) [63477]- >3 YRS  - Vaccine Administration, Initial [34685]    Greater than 50% of 25 minute visit were spent on counseling or coordination of  care regarding worsening anxiety and RLS, GERD.     JEANNIE Cano [facility]

## 2018-11-20 ENCOUNTER — OFFICE VISIT (OUTPATIENT)
Dept: FAMILY MEDICINE | Facility: OTHER | Age: 43
End: 2018-11-20
Payer: MEDICAID

## 2018-11-20 VITALS
TEMPERATURE: 97.1 F | BODY MASS INDEX: 27.96 KG/M2 | RESPIRATION RATE: 12 BRPM | HEART RATE: 76 BPM | SYSTOLIC BLOOD PRESSURE: 124 MMHG | DIASTOLIC BLOOD PRESSURE: 86 MMHG | HEIGHT: 65 IN

## 2018-11-20 DIAGNOSIS — K21.9 GASTROESOPHAGEAL REFLUX DISEASE, ESOPHAGITIS PRESENCE NOT SPECIFIED: ICD-10-CM

## 2018-11-20 DIAGNOSIS — F41.0 ANXIETY ATTACK: ICD-10-CM

## 2018-11-20 DIAGNOSIS — G25.81 RESTLESS LEGS SYNDROME (RLS): ICD-10-CM

## 2018-11-20 DIAGNOSIS — Z23 NEED FOR PROPHYLACTIC VACCINATION AND INOCULATION AGAINST INFLUENZA: ICD-10-CM

## 2018-11-20 DIAGNOSIS — F41.9 ANXIETY: Primary | ICD-10-CM

## 2018-11-20 PROCEDURE — 90686 IIV4 VACC NO PRSV 0.5 ML IM: CPT | Performed by: STUDENT IN AN ORGANIZED HEALTH CARE EDUCATION/TRAINING PROGRAM

## 2018-11-20 PROCEDURE — 90471 IMMUNIZATION ADMIN: CPT | Performed by: STUDENT IN AN ORGANIZED HEALTH CARE EDUCATION/TRAINING PROGRAM

## 2018-11-20 PROCEDURE — 99214 OFFICE O/P EST MOD 30 MIN: CPT | Mod: 25 | Performed by: STUDENT IN AN ORGANIZED HEALTH CARE EDUCATION/TRAINING PROGRAM

## 2018-11-20 RX ORDER — SERTRALINE HYDROCHLORIDE 100 MG/1
TABLET, FILM COATED ORAL
Qty: 180 TABLET | Refills: 1 | Status: CANCELLED | OUTPATIENT
Start: 2018-11-20

## 2018-11-20 RX ORDER — METOCLOPRAMIDE 10 MG/1
TABLET ORAL
Qty: 90 TABLET | Refills: 0 | Status: SHIPPED | OUTPATIENT
Start: 2018-11-20 | End: 2019-09-06

## 2018-11-20 RX ORDER — QUETIAPINE FUMARATE 200 MG/1
TABLET, FILM COATED ORAL
Qty: 90 TABLET | Refills: 0 | Status: CANCELLED | OUTPATIENT
Start: 2018-11-20

## 2018-11-20 RX ORDER — LORAZEPAM 1 MG/1
1 TABLET ORAL DAILY PRN
Qty: 30 TABLET | Refills: 1 | Status: SHIPPED | OUTPATIENT
Start: 2018-11-20 | End: 2019-01-16

## 2018-11-20 RX ORDER — PROPRANOLOL HYDROCHLORIDE 20 MG/1
20 TABLET ORAL 2 TIMES DAILY
Qty: 180 TABLET | Refills: 0 | Status: CANCELLED | OUTPATIENT
Start: 2018-11-20

## 2018-11-20 RX ORDER — LORAZEPAM 1 MG/1
1 TABLET ORAL DAILY PRN
Qty: 30 TABLET | Refills: 1 | Status: SHIPPED | OUTPATIENT
Start: 2018-11-20 | End: 2018-11-20

## 2018-11-20 RX ORDER — ONDANSETRON 4 MG/1
TABLET, FILM COATED ORAL
Qty: 18 TABLET | Refills: 10 | Status: SHIPPED | OUTPATIENT
Start: 2018-11-20 | End: 2020-06-16

## 2018-11-20 RX ORDER — ROPINIROLE 0.25 MG/1
0.25 TABLET, FILM COATED ORAL AT BEDTIME
Qty: 30 TABLET | Refills: 0 | Status: SHIPPED | OUTPATIENT
Start: 2018-11-20 | End: 2019-06-03 | Stop reason: DRUGHIGH

## 2018-11-20 ASSESSMENT — ANXIETY QUESTIONNAIRES
GAD7 TOTAL SCORE: 13
GAD7 TOTAL SCORE: 13
1. FEELING NERVOUS, ANXIOUS, OR ON EDGE: NEARLY EVERY DAY
4. TROUBLE RELAXING: SEVERAL DAYS
6. BECOMING EASILY ANNOYED OR IRRITABLE: NEARLY EVERY DAY
7. FEELING AFRAID AS IF SOMETHING AWFUL MIGHT HAPPEN: MORE THAN HALF THE DAYS
5. BEING SO RESTLESS THAT IT IS HARD TO SIT STILL: NOT AT ALL
3. WORRYING TOO MUCH ABOUT DIFFERENT THINGS: MORE THAN HALF THE DAYS
7. FEELING AFRAID AS IF SOMETHING AWFUL MIGHT HAPPEN: MORE THAN HALF THE DAYS
2. NOT BEING ABLE TO STOP OR CONTROL WORRYING: MORE THAN HALF THE DAYS
GAD7 TOTAL SCORE: 13

## 2018-11-20 ASSESSMENT — PATIENT HEALTH QUESTIONNAIRE - PHQ9
SUM OF ALL RESPONSES TO PHQ QUESTIONS 1-9: 12
10. IF YOU CHECKED OFF ANY PROBLEMS, HOW DIFFICULT HAVE THESE PROBLEMS MADE IT FOR YOU TO DO YOUR WORK, TAKE CARE OF THINGS AT HOME, OR GET ALONG WITH OTHER PEOPLE: VERY DIFFICULT
SUM OF ALL RESPONSES TO PHQ QUESTIONS 1-9: 12

## 2018-11-20 NOTE — NURSING NOTE
Injectable Influenza Immunization Documentation    1.  Is the person to be vaccinated sick today?   No    2. Does the person to be vaccinated have an allergy to a component   of the vaccine?   No  Egg Allergy Algorithm Link    3. Has the person to be vaccinated ever had a serious reaction   to influenza vaccine in the past?   No    4. Has the person to be vaccinated ever had Guillain-Barré syndrome?   No    Form completed by Georgiana Elizabeth CMA    Prior to injection verified patient identity using patient's name and date of birth.  Due to injection administration, patient instructed to remain in clinic for 15 minutes  afterwards, and to report any adverse reaction to me immediately.

## 2018-11-20 NOTE — MR AVS SNAPSHOT
After Visit Summary   11/20/2018    Radha Beckwith    MRN: [id number]           Patient Information     Date Of Birth          1975        Visit Information        Provider Department      11/20/2018 1:20 PM Marisa Story APRN CNP [facility]        Today's Diagnoses     Screening for malignant neoplasm of cervix        Screening for HIV (human immunodeficiency virus)        Need for prophylactic vaccination and inoculation against influenza        Anxiety        Psychophysiological insomnia        Sinus tachycardia           Follow-ups after your visit        Your next 10 appointments already scheduled     Nov 30, 2018  1:20 PM CST   Office Visit with JEANNIE Thomas CNP   [facility] ([facility]    [address]  [facility] [address] [phone]           Bring a current list of meds and any records pertaining to this visit. For Physicals, please bring immunization records and any forms needing to be filled out. Please arrive 10 minutes early to complete paperwork.              Who to contact     If you have questions or need follow up information about today's clinic visit or your schedule please contact [facility] directly at [phone].  Normal or non-critical lab and imaging results will be communicated to you by MyChart, letter or phone within 4 business days after the clinic has received the results. If you do not hear from us within 7 days, please contact the clinic through MyChart or phone. If you have a critical or abnormal lab result, we will notify you by phone as soon as possible.  Submit refill requests through [employer] or call your pharmacy and they will forward the refill request to us. Please allow 3 business days for your refill to be completed.          Additional Information About Your Visit        Care EveryWhere ID     This is your Care EveryWhere ID. This could be  "used by other organizations to access your [place] medical records  [id number]        Your Vitals Were     Pulse Temperature Respirations Height Last Period BMI (Body Mass Index)    76 97.1  F (36.2  C) (Temporal) 12 5' 5\" (1.651 m) (LMP Unknown) 27.96 kg/m2       Blood Pressure from Last 3 Encounters:   11/20/18 124/86   05/25/18 122/80   05/18/18 128/84    Weight from Last 3 Encounters:   05/25/18 168 lb (76.2 kg)   05/18/18 167 lb (75.8 kg)   04/12/18 164 lb (74.4 kg)              We Performed the Following     FLU VACCINE, SPLIT VIRUS, IM (QUADRIVALENT) [85651]- >3 YRS     Vaccine Administration, Initial [53303]          Where to get your medicines      Some of these will need a paper prescription and others can be bought over the counter.  Ask your nurse if you have questions.     Bring a paper prescription for each of these medications     LORazepam 1 MG tablet          Primary Care Provider Office Phone # Fax #    Marisa Emy Story, APRN [phone] [address] [place] [address]        Equal Access to Services     JAELYN GALLARDO AH: Hadii quintin gayo Soomaali, waaxda luqadaha, qaybta kaalmada adeegyada, yadi luong. So [facility] [phone].    ATENCIÓN: Si habla español, tiene a rueda disposición servicios gratuitos de asistencia lingüística. Nancy al [phone].    We comply with applicable federal civil rights laws and [place] laws. We do not discriminate on the basis of race, color, national origin, age, disability, sex, sexual orientation, or gender identity.            Thank you!     Thank you for choosing [facility]  for your care. Our goal is always to provide you with excellent care. Hearing back from our patients is one way we can continue to improve our services. Please take a few minutes to complete the written survey that you may receive in the mail after your visit with us. Thank you!             Your Updated " Medication List - Protect others around you: Learn how to safely use, store and throw away your medicines at www.disposemymeds.org.          This list is accurate as of 11/20/18  2:37 PM.  Always use your most recent med list.                   Brand Name Dispense Instructions for use Diagnosis    ACETAMINOPHEN PO      Take 1,000 mg by mouth every 6 hours as needed for pain        buprenorphine-naloxone 2-0.5 MG Subl sublingual tablet    SUBOXONE     Place 0.5 tablets under the tongue 2 times daily    Opioid dependence in remission (H)       cetirizine 10 MG tablet    zyrTEC    30 tablet    TAKE ONE TABLET BY MOUTH EVERY EVENING    Seasonal allergic rhinitis, unspecified chronicity, unspecified trigger       LORazepam 1 MG tablet    ATIVAN    30 tablet    Take 1 tablet (1 mg) by mouth daily as needed for anxiety (or panic attack. Use sparingly. Do not take before driving.) 20 tablets to last one month.    Anxiety       metoclopramide 10 MG tablet    REGLAN    90 tablet    TAKE ONE TABLET BY MOUTH THREE TIMES A DAY AS NEEDED    Gastroesophageal reflux disease, esophagitis presence not specified       omeprazole 20 MG tablet     60 tablet    Take 1 tablet (20 mg) by mouth 2 times daily Take 30-60 minutes before a meal.    Gastroesophageal reflux disease, esophagitis presence not specified       ondansetron 4 MG tablet    ZOFRAN    18 tablet    TAKE ONE TABLET BY MOUTH EVERY 6 HOURS AS NEEDED FOR NAUSEA OR VOMITING    Anxiety attack       order for DME     1 each    Blood pressure cuff    Elevated blood pressure reading without diagnosis of hypertension       propranolol 20 MG tablet    INDERAL    180 tablet    TAKE ONE TABLET BY MOUTH TWICE A DAY    Sinus tachycardia, Anxiety       QUEtiapine 200 MG tablet    SEROquel    90 tablet    TAKE ONE TABLET BY MOUTH EVERY NIGHT AT BEDTIME    Anxiety, Psychophysiological insomnia       sertraline 100 MG tablet    ZOLOFT    180 tablet    TAKE TWO TABLETS BY MOUTH EVERY DAY     Anxiety

## 2018-11-21 ASSESSMENT — PATIENT HEALTH QUESTIONNAIRE - PHQ9: SUM OF ALL RESPONSES TO PHQ QUESTIONS 1-9: 12

## 2018-11-21 ASSESSMENT — ANXIETY QUESTIONNAIRES: GAD7 TOTAL SCORE: 13

## 2018-11-27 NOTE — PROGRESS NOTES
SUBJECTIVE:   Radha Beckwith is a 43 year old female who presents to clinic today for the following health issues:      HPI     Anxiety Follow-Up    Status since last visit: Worsened little worse.     Other associated symptoms:Will discuss with provider    Complicating factors:   Significant life event: Yes-  Son is coming from from the [employer]. Son and mom fight like cats and dogs. Her mom lives with her and she is the primary caregiver. Mom treats her poorly and is very demanding of her time and energy. She is not able to get out of the house much because her mom expects her home when she is not at work. She feels down because she is not able to enjoy time with friends and with her hobbies.    Current substance abuse: None  Depression symptoms: Yes     BO-7 SCORE 3/6/2018 4/12/2018 11/20/2018   Total Score 15 (severe anxiety) 15 (severe anxiety) 13 (moderate anxiety)   Total Score 15 15 13     PHQ-9 SCORE 3/6/2018 11/20/2018 11/30/2018   PHQ-9 Total Score MyChart 13 (Moderate depression) 12 (Moderate depression) 13 (Moderate depression)   PHQ-9 Total Score 13 12 13       BO-7    Insomnia - she is also not sleeping well at night. Her Seroquel has been increased in dose but is not working. She has tried trazodone, amitriptyline and Ambien in the past. Ambien worked well but she stopped it and is not sure why. She takes melatonin every night. She is sometimes getting only a couple hours of sleep and then has to go to work. 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 She has never had a sleep study. She would like to try something else for sleep.       Fatigue      Duration: Long time    Description (location/character/radiation): Patient states she feels like she can fall asleep at any time. Goes to bed at 9:30pm but will wake up 10-15 times throughout the night    Intensity:   moderate    Accompanying signs and symptoms: Fatigue    History (similar episodes/previous evaluation): Yes, a lot worse now    Precipitating or alleviating factors: melatonin, seroquel - helps get to sleep    Therapies tried and outcome: Melatonin, Seroquel to try to get better sleep.  She has been drinking more water as she has never been very good about drinking water. She usually has one cup of coffee every day.   She would like to have her thyroid checked today. She has been taking a lot of Tylenol for her pain so she is concerned about her liver health. She drinks a beer after work, sometimes two. Rarely drinks more than two beers on any occasion other than a couple times a year at special occasions.       Medication Followup of ropinirole    Taking Medication as prescribed: no - has been taking 2 pills a day    Side Effects:  None    Medication Helping Symptoms:  No - helps a little. Would like to increase dose of ropinirole to 0.5 mg at bedtime.     Pain in neck, back and hands  She has had more swelling in her hands in the morning and they will feel stiff. It will take an hour or so of moving for them to feel a little better. She also feels like her knees, neck and back are stiff in the morning. Her neck muscles are always tight and has a lot of knots. Her cousin is a massage therapist and she will do deep tissue massage but this usually causes her to get a headache afterwards. She was doing physical therapy in [place] in the past where they would do gentle release of the trigger points and this was helpful. She was prescribed narcotics for pain control in the past and has been on Suboxone since she got off of narcotics. She is taking Tylenol for pain and is worried about her liver health with this. She does not exercise regularly. She has no family history of rheumatoid arthritis that she knows of. A couple of her finger joints are tender when she touches them but has not noticed any warmth or redness of  the joints. She also has pain over her ribs in the front when she presses on them and feels like the skin hurts. If she presses on those areas the pain will seem to last for several hours.     Answers for HPI/ROS submitted by the patient on 11/30/2018   If you checked off any problems, how difficult have these problems made it for you to do your work, take care of things at home, or get along with other people?: Extremely difficult  PHQ9 TOTAL SCORE: 13      Problem list and histories reviewed & adjusted, as indicated.  Additional history: as documented    Patient Active Problem List   Diagnosis     GERD (gastroesophageal reflux disease)     Restless legs     Anxiety     Ingrowing nail     Hyperlipidemia LDL goal <100     Hypokalemia     Atypical chest pain     Tobacco abuse     Anxiety attack     Opioid dependence in remission (H)     Papanicolaou smear of cervix with low grade squamous intraepithelial lesion (LGSIL)     S/P hysterectomy     Psychophysiological insomnia     Chronic bilateral low back pain without sciatica     Past Surgical History:   Procedure Laterality Date     BIOPSY CERVICAL, LOCAL EXCISION, SINGLE/MULTIPLE N/A 8/10/2017    Procedure: BIOPSY CERVICAL, LOCAL EXCISION, SINGLE/MULTIPLE;;  Surgeon: Michael Chandler MD;  Location: [facility] OR     COLPOSCOPY, BIOPSY, COMBINED N/A 8/10/2017    Procedure: COMBINED COLPOSCOPY, BIOPSY;  Colposcopy with Cervical Biopsies and Endometrial Biopsy, Exam with Ultrasound;  Surgeon: Michael Chandler MD;  Location: [facility] OR     ESOPHAGOSCOPY, GASTROSCOPY, DUODENOSCOPY (EGD), COMBINED N/A 4/17/2017    Procedure: COMBINED ESOPHAGOSCOPY, GASTROSCOPY, DUODENOSCOPY (EGD);  Surgeon: Ibrahima Esposito MD;  Location: [facility] GI     EXAM UNDER ANESTHESIA PELVIC N/A 8/10/2017    Procedure: EXAM UNDER ANESTHESIA PELVIC;;  Surgeon: Michael Chandler MD;  Location: [facility] OR     HC UGI ENDOSCOPY, SIMPLE EXAM  01/07/08     HYSTERECTOMY       LAPAROSCOPIC  CHOLECYSTECTOMY N/A 2/2/2018    Procedure: LAPAROSCOPIC CHOLECYSTECTOMY;  Laparoscopic Cholecystectomy;  Surgeon: Tigre Lowry DO;  Location: [facility]     LAPAROSCOPIC HYSTERECTOMY TOTAL N/A 10/30/2017    Procedure: LAPAROSCOPIC HYSTERECTOMY TOTAL;  LAPAROSCOPIC HYSTERECTOMY TOTAL POSSIBLE SALPINGO-OOPHERECTOMY (BILATERAL);  Surgeon: Michael Chandler MD;  Location: [facility] OR       Social History   Substance Use Topics     Smoking status: Current Every Day Smoker     Packs/day: 0.25     Years: 20.00     Types: Cigarettes     Smokeless tobacco: Never Used      Comment: 5-6 cigs daily     Alcohol use Yes      Comment: occasional drinks     Family History   Problem Relation Age of Onset     Depression Mother      Respiratory Mother      Chronic Obstructive Pulmonary Disease Mother      Cerebrovascular Disease Father      Brain anyeurism     Adrenal Disorder Other      Chronic Obstructive Pulmonary Disease Other      Breast Cancer Cousin          Current Outpatient Prescriptions   Medication Sig Dispense Refill     ACETAMINOPHEN PO Take 1,000 mg by mouth every 6 hours as needed for pain       buprenorphine-naloxone (SUBOXONE) 2-0.5 MG SUBL sublingual tablet Place 0.5 tablets under the tongue 2 times daily  0     cetirizine (ZYRTEC) 10 MG tablet TAKE ONE TABLET BY MOUTH EVERY EVENING 30 tablet 3     LORazepam (ATIVAN) 1 MG tablet Take 1 tablet (1 mg) by mouth daily as needed for anxiety (or panic attack. Use sparingly. Do not take before driving.) 30 tablet 1     metoclopramide (REGLAN) 10 MG tablet TAKE ONE TABLET BY MOUTH THREE TIMES A DAY AS NEEDED 90 tablet 0     omeprazole 20 MG tablet Take 1 tablet (20 mg) by mouth 2 times daily Take 30-60 minutes before a meal. 60 tablet 3     ondansetron (ZOFRAN) 4 MG tablet TAKE ONE TABLET BY MOUTH every 12 hours AS NEEDED FOR NAUSEA OR VOMITING 18 tablet 10     order for DME Blood pressure cuff 1 each 0     propranolol (INDERAL) 20 MG tablet TAKE ONE TABLET BY  "MOUTH TWICE A  tablet 0     QUEtiapine (SEROQUEL) 200 MG tablet TAKE ONE TABLET BY MOUTH EVERY NIGHT AT BEDTIME 90 tablet 0     rOPINIRole (REQUIP) 0.25 MG tablet Take 1 tablet (0.25 mg) by mouth At Bedtime 30 tablet 0     rOPINIRole (REQUIP) 0.5 MG tablet Take 1 tablet (0.5 mg) by mouth At Bedtime 30 tablet 5     sertraline (ZOLOFT) 100 MG tablet TAKE TWO TABLETS BY MOUTH EVERY  tablet 1     SUBOXONE 2-0.5 MG per film        zolpidem (AMBIEN) 5 MG tablet Take 1 tablet (5 mg) by mouth nightly as needed for sleep 30 tablet 5     BP Readings from Last 3 Encounters:   11/30/18 118/74   11/20/18 124/86   05/25/18 122/80    Wt Readings from Last 3 Encounters:   11/30/18 172 lb 3.2 oz (78.1 kg)   05/25/18 168 lb (76.2 kg)   05/18/18 167 lb (75.8 kg)                  Labs reviewed in EPIC    ROS:  Constitutional, HEENT, cardiovascular, pulmonary, GI, , musculoskeletal, neuro, skin, endocrine and psych systems are negative, except as otherwise noted.    OBJECTIVE:     /74  Pulse 80  Temp 97.3  F (36.3  C) (Temporal)  Resp 16  Ht 5' 5\" (1.651 m)  Wt 172 lb 3.2 oz (78.1 kg)  LMP  (LMP Unknown)  BMI 28.66 kg/m2  Body mass index is 28.66 kg/(m^2).  GENERAL: alert, no acute distress and fatigued  EYES: Eyes grossly normal to inspection, PERRL and conjunctivae and sclerae normal  HENT: ear canals and TM's normal, nose and mouth without ulcers or lesions  NECK: no adenopathy, no asymmetry, masses, or scars and thyroid normal to palpation  RESP: lungs clear to auscultation - no rales, rhonchi or wheezes  CV: regular rate and rhythm, normal S1 S2, no S3 or S4, no murmur, click or rub, no peripheral edema and peripheral pulses strong  ABDOMEN: soft, nontender, no hepatosplenomegaly, no masses and bowel sounds normal  MS: no masses, erythema or induration over intercostal spaces on upper chest, no gross musculoskeletal defects noted, no edema  SKIN: no suspicious lesions or rashes  NEURO: Normal strength " and tone, mentation intact and speech normal  Comprehensive back pain exam:  Tenderness of cervical paraspinal muscles, bilateral upper trapezius, medial scapular muscles, bilateral thoracolumbar paraspinal muscles, Range of motion not limited by pain, Lower extremity strength functional and equal on both sides and Lower extremity sensation normal and equal on both sides  PSYCH: mentation appears normal, anxious, fatigued, judgement and insight intact and appearance well groomed  LYMPH: no cervical or supraclavicular lymphadenopathy    Diagnostic Test Results:  Results for orders placed or performed in visit on 11/30/18   TSH with free T4 reflex   Result Value Ref Range    TSH 1.88 0.40 - 4.00 mU/L   Comprehensive metabolic panel (BMP + Alb, Alk Phos, ALT, AST, Total. Bili, TP)   Result Value Ref Range    Sodium 136 133 - 144 mmol/L    Potassium 4.2 3.4 - 5.3 mmol/L    Chloride 101 94 - 109 mmol/L    Carbon Dioxide 28 20 - 32 mmol/L    Anion Gap 7 3 - 14 mmol/L    Glucose 96 70 - 99 mg/dL    Urea Nitrogen 9 7 - 30 mg/dL    Creatinine 0.67 0.52 - 1.04 mg/dL    GFR Estimate >90 >60 mL/min/1.7m2    GFR Estimate If Black >90 >60 mL/min/1.7m2    Calcium 8.8 8.5 - 10.1 mg/dL    Bilirubin Total 0.3 0.2 - 1.3 mg/dL    Albumin 3.6 3.4 - 5.0 g/dL    Protein Total 7.4 6.8 - 8.8 g/dL    Alkaline Phosphatase 79 40 - 150 U/L    ALT 30 0 - 50 U/L    AST 24 0 - 45 U/L   CBC with platelets   Result Value Ref Range    WBC 7.4 4.0 - 11.0 10e9/L    RBC Count 4.17 3.8 - 5.2 10e12/L    Hemoglobin 13.1 11.7 - 15.7 g/dL    Hematocrit 38.7 35.0 - 47.0 %    MCV 93 78 - 100 fl    MCH 31.4 26.5 - 33.0 pg    MCHC 33.9 31.5 - 36.5 g/dL    RDW 13.0 10.0 - 15.0 %    Platelet Count 218 150 - 450 10e9/L       ASSESSMENT/PLAN:     1. Psychophysiological insomnia  Will trial Ambien since this has worked for her in the past. Reviewed possible side effects and patient verbalizes understandings and risk associated with possible side effects of Ambien. I  recommended she schedule an appointment with the sleep specialist to discuss other diagnostics and/or treatment options.   - zolpidem (AMBIEN) 5 MG tablet; Take 1 tablet (5 mg) by mouth nightly as needed for sleep  Dispense: 30 tablet; Refill: 5  - SLEEP EVALUATION & MANAGEMENT REFERRAL - ADULT -[facility] [place] [phone] (Age 11 if over 100 lbs); Future    2. Generalized anxiety disorder  Recommend counseling to discuss stressors with her mom. Continue sertraline and Seroquel. Follow up if symptoms worsen or persist.  - MENTAL HEALTH REFERRAL  - Adult; Outpatient Treatment; Individual/Couples/Family/Group Therapy/Health Psychology[place] [facility] [place] [phone]; We will contact you to schedule the appointment or please call with any questions    3. Moderate episode of recurrent major depressive disorder (H)  See note #2  - MENTAL HEALTH REFERRAL  - Adult; Outpatient Treatment; Individual/Couples/Family/Group Therapy/Health Psychology; [facility] [phone]; We will contact you to schedule the appointment or please call with any questions    4. Restless legs syndrome (RLS)  Increase dose to 0.5 mg at bedtime. Return to clinic if symptoms persist or fail to improve.  - rOPINIRole (REQUIP) 0.5 MG tablet; Take 1 tablet (0.5 mg) by mouth At Bedtime  Dispense: 30 tablet; Refill: 5    5. Arthralgia of both hands  Generalized swelling of hands with a few interphalangeal joints with tenderness to palpation. Will check RF to rule this out.   - Rheumatoid factor    6. Neck pain  Referral to physical therapy for evaluation and treatment.   - PHYSICAL THERAPY REFERRAL; Future    7. Fatigue, unspecified type  Normal CBC. Discussed with patient that much of her fatigue is likely due to poorly controlled insomnia and caregiver strain with her mother. Patient agrees that this is the likely cause. Recommended controlling factors she is able to like diet, getting some  exercise, seeing sleep specialist, going to counseling and follow up if symptoms persist or worsen.   - CBC with platelets    8. Screening for diabetes mellitus  - Comprehensive metabolic panel (BMP + Alb, Alk Phos, ALT, AST, Total. Bili, TP)    9. Encounter for screening mammogram for breast cancer  - *MA Screening Digital Bilateral; Future    10. Screening for thyroid disorder  - TSH with free T4 reflex    Greater than 50% of 50 minute visit were spent on counseling or coordination of care regarding insomnia, anxiety, depression, RLS, arthralgias, neck pain, fatigue.      JEANNIE Cano [facility]

## 2018-11-30 ENCOUNTER — OFFICE VISIT (OUTPATIENT)
Dept: FAMILY MEDICINE | Facility: OTHER | Age: 43
End: 2018-11-30
Payer: MEDICAID

## 2018-11-30 VITALS
TEMPERATURE: 97.3 F | HEART RATE: 80 BPM | WEIGHT: 172.2 LBS | RESPIRATION RATE: 16 BRPM | SYSTOLIC BLOOD PRESSURE: 118 MMHG | DIASTOLIC BLOOD PRESSURE: 74 MMHG | HEIGHT: 65 IN | BODY MASS INDEX: 28.69 KG/M2

## 2018-11-30 DIAGNOSIS — R53.83 FATIGUE, UNSPECIFIED TYPE: ICD-10-CM

## 2018-11-30 DIAGNOSIS — M25.542 ARTHRALGIA OF BOTH HANDS: ICD-10-CM

## 2018-11-30 DIAGNOSIS — F33.1 MODERATE EPISODE OF RECURRENT MAJOR DEPRESSIVE DISORDER (H): ICD-10-CM

## 2018-11-30 DIAGNOSIS — Z13.1 SCREENING FOR DIABETES MELLITUS: ICD-10-CM

## 2018-11-30 DIAGNOSIS — G25.81 RESTLESS LEGS SYNDROME (RLS): ICD-10-CM

## 2018-11-30 DIAGNOSIS — Z13.29 SCREENING FOR THYROID DISORDER: ICD-10-CM

## 2018-11-30 DIAGNOSIS — Z12.31 ENCOUNTER FOR SCREENING MAMMOGRAM FOR BREAST CANCER: ICD-10-CM

## 2018-11-30 DIAGNOSIS — M54.2 NECK PAIN: ICD-10-CM

## 2018-11-30 DIAGNOSIS — F41.1 GENERALIZED ANXIETY DISORDER: ICD-10-CM

## 2018-11-30 DIAGNOSIS — M25.541 ARTHRALGIA OF BOTH HANDS: ICD-10-CM

## 2018-11-30 DIAGNOSIS — F51.04 PSYCHOPHYSIOLOGICAL INSOMNIA: Primary | ICD-10-CM

## 2018-11-30 LAB
ALBUMIN SERPL-MCNC: 3.6 G/DL (ref 3.4–5)
ALP SERPL-CCNC: 79 U/L (ref 40–150)
ALT SERPL W P-5'-P-CCNC: 30 U/L (ref 0–50)
ANION GAP SERPL CALCULATED.3IONS-SCNC: 7 MMOL/L (ref 3–14)
AST SERPL W P-5'-P-CCNC: 24 U/L (ref 0–45)
BILIRUB SERPL-MCNC: 0.3 MG/DL (ref 0.2–1.3)
BUN SERPL-MCNC: 9 MG/DL (ref 7–30)
CALCIUM SERPL-MCNC: 8.8 MG/DL (ref 8.5–10.1)
CHLORIDE SERPL-SCNC: 101 MMOL/L (ref 94–109)
CO2 SERPL-SCNC: 28 MMOL/L (ref 20–32)
CREAT SERPL-MCNC: 0.67 MG/DL (ref 0.52–1.04)
ERYTHROCYTE [DISTWIDTH] IN BLOOD BY AUTOMATED COUNT: 13 % (ref 10–15)
GFR SERPL CREATININE-BSD FRML MDRD: >90 ML/MIN/1.7M2
GLUCOSE SERPL-MCNC: 96 MG/DL (ref 70–99)
HCT VFR BLD AUTO: 38.7 % (ref 35–47)
HGB BLD-MCNC: 13.1 G/DL (ref 11.7–15.7)
MCH RBC QN AUTO: 31.4 PG (ref 26.5–33)
MCHC RBC AUTO-ENTMCNC: 33.9 G/DL (ref 31.5–36.5)
MCV RBC AUTO: 93 FL (ref 78–100)
PLATELET # BLD AUTO: 218 10E9/L (ref 150–450)
POTASSIUM SERPL-SCNC: 4.2 MMOL/L (ref 3.4–5.3)
PROT SERPL-MCNC: 7.4 G/DL (ref 6.8–8.8)
RBC # BLD AUTO: 4.17 10E12/L (ref 3.8–5.2)
SODIUM SERPL-SCNC: 136 MMOL/L (ref 133–144)
TSH SERPL DL<=0.005 MIU/L-ACNC: 1.88 MU/L (ref 0.4–4)
WBC # BLD AUTO: 7.4 10E9/L (ref 4–11)

## 2018-11-30 PROCEDURE — 80053 COMPREHEN METABOLIC PANEL: CPT | Performed by: STUDENT IN AN ORGANIZED HEALTH CARE EDUCATION/TRAINING PROGRAM

## 2018-11-30 PROCEDURE — 99215 OFFICE O/P EST HI 40 MIN: CPT | Performed by: STUDENT IN AN ORGANIZED HEALTH CARE EDUCATION/TRAINING PROGRAM

## 2018-11-30 PROCEDURE — 85027 COMPLETE CBC AUTOMATED: CPT | Performed by: STUDENT IN AN ORGANIZED HEALTH CARE EDUCATION/TRAINING PROGRAM

## 2018-11-30 PROCEDURE — 84443 ASSAY THYROID STIM HORMONE: CPT | Performed by: STUDENT IN AN ORGANIZED HEALTH CARE EDUCATION/TRAINING PROGRAM

## 2018-11-30 PROCEDURE — 86431 RHEUMATOID FACTOR QUANT: CPT | Performed by: STUDENT IN AN ORGANIZED HEALTH CARE EDUCATION/TRAINING PROGRAM

## 2018-11-30 PROCEDURE — 36415 COLL VENOUS BLD VENIPUNCTURE: CPT | Performed by: STUDENT IN AN ORGANIZED HEALTH CARE EDUCATION/TRAINING PROGRAM

## 2018-11-30 RX ORDER — ROPINIROLE 0.5 MG/1
0.5 TABLET, FILM COATED ORAL AT BEDTIME
Qty: 30 TABLET | Refills: 5 | Status: SHIPPED | OUTPATIENT
Start: 2018-11-30 | End: 2019-05-28

## 2018-11-30 RX ORDER — BUPRENORPHINE HYDROCHLORIDE, NALOXONE HYDROCHLORIDE 2; .5 MG/1; MG/1
FILM, SOLUBLE BUCCAL; SUBLINGUAL
COMMUNITY
Start: 2018-11-20 | End: 2019-12-23

## 2018-11-30 RX ORDER — ZOLPIDEM TARTRATE 5 MG/1
5 TABLET ORAL
Qty: 30 TABLET | Refills: 5 | Status: SHIPPED | OUTPATIENT
Start: 2018-11-30 | End: 2019-05-29

## 2018-11-30 ASSESSMENT — PATIENT HEALTH QUESTIONNAIRE - PHQ9
SUM OF ALL RESPONSES TO PHQ QUESTIONS 1-9: 13
SUM OF ALL RESPONSES TO PHQ QUESTIONS 1-9: 13
10. IF YOU CHECKED OFF ANY PROBLEMS, HOW DIFFICULT HAVE THESE PROBLEMS MADE IT FOR YOU TO DO YOUR WORK, TAKE CARE OF THINGS AT HOME, OR GET ALONG WITH OTHER PEOPLE: EXTREMELY DIFFICULT

## 2018-11-30 NOTE — MR AVS SNAPSHOT
After Visit Summary   11/30/2018    Radha Beckwith    MRN: [id number]           Patient Information     Date Of Birth          1975        Visit Information        Provider Department      11/30/2018 1:20 PM Marisa Story APRN [facility]        Today's Diagnoses     Psychophysiological insomnia    -  1    Screening for malignant neoplasm of cervix        Screening for HIV (human immunodeficiency virus)        Screening for thyroid disorder        Arthralgia of both hands        Neck pain        Generalized anxiety disorder        Moderate episode of recurrent major depressive disorder (H)        Fatigue, unspecified type        Screening for diabetes mellitus        Encounter for screening mammogram for breast cancer        Restless legs syndrome (RLS)          Care Instructions    Physical therapy referral - You will receive a call to set up an appointment     Try Ambien at bedtime for sleep.    Schedule mammogram.    Sleep specialist referral - call to schedule an appointment.    Blood work today.    Counseling referral - schedule an appointment with Kandy Story NP-C            Follow-ups after your visit        Additional Services     MENTAL HEALTH REFERRAL  - Adult; Outpatient Treatment; Individual/Couples/Family/Group Therapy/Health Psychology; [facility] [facility] [phone]; We will contact you to schedule the appointment or please call with any questions       All scheduling is subject to the client's specific insurance plan & benefits, provider/location availability, and provider clinical specialities.  Please arrive 15 minutes early for your first appointment and bring your completed paperwork.    Please be aware that coverage of these services is subject to the terms and limitations of your health insurance plan.  Call member services at your health plan with any benefit or coverage questions.                       PHYSICAL THERAPY REFERRAL       If you have not heard from the scheduling office within 2 business days, please call [phone] for all locations, with the exception of [place], please call [phone] and [place], please call [phone].    Please be aware that coverage of these services is subject to the terms and limitations of your health insurance plan.  Call member services at your health plan with any benefit or coverage questions.            SLEEP EVALUATION & MANAGEMENT REFERRAL - [facility] [phone] (Age 13 if over 100 lbs)       Please be aware that coverage of these services is subject to the terms and limitations of your health insurance plan.  Call member services at your health plan with any benefit or coverage questions.      Please bring the following to your appointment:    >>   List of current medications   >>   This referral request   >>   Any documents/labs given to you for this referral                      Future tests that were ordered for you today     Open Future Orders        Priority Expected Expires Ordered    SLEEP EVALUATION & MANAGEMENT REFERRAL - [facility] [phone] (Age 13 if over 100 lbs) Routine  11/30/2019 11/30/2018    *MA Screening Digital Bilateral Routine  11/30/2019 11/30/2018    PHYSICAL THERAPY REFERRAL Routine  11/30/2019 11/30/2018            Who to contact     If you have questions or need follow up information about today's clinic visit or your schedule please contact [facility] [place] directly at [phone].  Normal or non-critical lab and imaging results will be communicated to you by MyChart, letter or phone within 4 business days after the clinic has received the results. If you do not hear from us within 7 days, please contact the clinic through MyChart or phone. If you have a critical or abnormal lab result, we will notify you by phone as soon as possible.  Submit  "refill requests through [employer] or call your pharmacy and they will forward the refill request to us. Please allow 3 business days for your refill to be completed.          Additional Information About Your Visit        Care EveryWhere ID     This is your Care EveryWhere ID. This could be used by other organizations to access your [facility] medical records  [id number]        Your Vitals Were     Pulse Temperature Respirations Height Last Period BMI (Body Mass Index)    80 97.3  F (36.3  C) (Temporal) 16 5' 5\" (1.651 m) (LMP Unknown) 28.66 kg/m2       Blood Pressure from Last 3 Encounters:   11/30/18 118/74   11/20/18 124/86   05/25/18 122/80    Weight from Last 3 Encounters:   11/30/18 172 lb 3.2 oz (78.1 kg)   05/25/18 168 lb (76.2 kg)   05/18/18 167 lb (75.8 kg)              We Performed the Following     CBC with platelets     Comprehensive metabolic panel (BMP + Alb, Alk Phos, ALT, AST, Total. Bili, TP)     MENTAL HEALTH REFERRAL  - Adult; Outpatient Treatment; Individual/Couples/Family/Group Therapy/Health Psychology; [facility]: [facility] [phone]; We will contact you to schedule the appointment or please call with any questions     Rheumatoid factor     TSH with free T4 reflex          Today's Medication Changes          These changes are accurate as of 11/30/18  2:32 PM.  If you have any questions, ask your nurse or doctor.               Start taking these medicines.        Dose/Directions    zolpidem 5 MG tablet   Commonly known as:  AMBIEN   Used for:  Psychophysiological insomnia   Started by:  Marisa Story APRN CNP        Dose:  5 mg   Take 1 tablet (5 mg) by mouth nightly as needed for sleep   Quantity:  30 tablet   Refills:  5         These medicines have changed or have updated prescriptions.        Dose/Directions    * rOPINIRole 0.25 MG tablet   Commonly known as:  REQUIP   This may have changed:  Another medication with the same name was added. Make sure you " understand how and when to take each.   Used for:  Restless legs syndrome (RLS)   Changed by:  Marisa Story APRN CNP        Dose:  0.25 mg   Take 1 tablet (0.25 mg) by mouth At Bedtime   Quantity:  30 tablet   Refills:  0       * rOPINIRole 0.5 MG tablet   Commonly known as:  REQUIP   This may have changed:  You were already taking a medication with the same name, and this prescription was added. Make sure you understand how and when to take each.   Used for:  Restless legs syndrome (RLS)   Changed by:  Marisa Story APRN CNP        Dose:  0.5 mg   Take 1 tablet (0.5 mg) by mouth At Bedtime   Quantity:  30 tablet   Refills:  5       * Notice:  This list has 2 medication(s) that are the same as other medications prescribed for you. Read the directions carefully, and ask your doctor or other care provider to review them with you.         Where to get your medicines      These medications were sent to [facility] [address]   [address] [place] [address]     Phone:  [phone]     rOPINIRole 0.5 MG tablet         Some of these will need a paper prescription and others can be bought over the counter.  Ask your nurse if you have questions.     Bring a paper prescription for each of these medications     zolpidem 5 MG tablet                Primary Care Provider Office Phone # Fax #    JEANNIE Thomas [phone]       [address] [place] [address]        Equal Access to Services     JAELYN GALLARDO AH: Hadii aad ku hadasho Soomaali, waaxda luqadaha, qaybta kaalmada adeegyada, waxay wesin hayyeni amezcua . So [facility] [phone].    ATENCIÓN: Si habla español, tiene a rueda disposición servicios gratuitos de asistencia lingüística. Llame al [phone].    We comply with applicable federal civil rights laws and [place] laws. We do not discriminate on the basis of race, color, national origin,  age, disability, sex, sexual orientation, or gender identity.            Thank you!     Thank you for choosing [facility]  for your care. Our goal is always to provide you with excellent care. Hearing back from our patients is one way we can continue to improve our services. Please take a few minutes to complete the written survey that you may receive in the mail after your visit with us. Thank you!             Your Updated Medication List - Protect others around you: Learn how to safely use, store and throw away your medicines at www.disposemymeds.org.          This list is accurate as of 11/30/18  2:32 PM.  Always use your most recent med list.                   Brand Name Dispense Instructions for use Diagnosis    ACETAMINOPHEN PO      Take 1,000 mg by mouth every 6 hours as needed for pain        * buprenorphine-naloxone 2-0.5 MG Subl sublingual tablet    SUBOXONE     Place 0.5 tablets under the tongue 2 times daily    Opioid dependence in remission (H)       * SUBOXONE 2-0.5 MG per film   Generic drug:  buprenorphine HCl-naloxone HCl           cetirizine 10 MG tablet    zyrTEC    30 tablet    TAKE ONE TABLET BY MOUTH EVERY EVENING    Seasonal allergic rhinitis, unspecified chronicity, unspecified trigger       LORazepam 1 MG tablet    ATIVAN    30 tablet    Take 1 tablet (1 mg) by mouth daily as needed for anxiety (or panic attack. Use sparingly. Do not take before driving.)    Anxiety       metoclopramide 10 MG tablet    REGLAN    90 tablet    TAKE ONE TABLET BY MOUTH THREE TIMES A DAY AS NEEDED    Gastroesophageal reflux disease, esophagitis presence not specified       omeprazole 20 MG tablet     60 tablet    Take 1 tablet (20 mg) by mouth 2 times daily Take 30-60 minutes before a meal.    Gastroesophageal reflux disease, esophagitis presence not specified       ondansetron 4 MG tablet    ZOFRAN    18 tablet    TAKE ONE TABLET BY MOUTH every 12 hours AS NEEDED FOR NAUSEA OR VOMITING     Anxiety attack       order for DME     1 each    Blood pressure cuff    Elevated blood pressure reading without diagnosis of hypertension       propranolol 20 MG tablet    INDERAL    180 tablet    TAKE ONE TABLET BY MOUTH TWICE A DAY    Sinus tachycardia, Anxiety       QUEtiapine 200 MG tablet    SEROquel    90 tablet    TAKE ONE TABLET BY MOUTH EVERY NIGHT AT BEDTIME    Anxiety, Psychophysiological insomnia       * rOPINIRole 0.25 MG tablet    REQUIP    30 tablet    Take 1 tablet (0.25 mg) by mouth At Bedtime    Restless legs syndrome (RLS)       * rOPINIRole 0.5 MG tablet    REQUIP    30 tablet    Take 1 tablet (0.5 mg) by mouth At Bedtime    Restless legs syndrome (RLS)       sertraline 100 MG tablet    ZOLOFT    180 tablet    TAKE TWO TABLETS BY MOUTH EVERY DAY    Anxiety       zolpidem 5 MG tablet    AMBIEN    30 tablet    Take 1 tablet (5 mg) by mouth nightly as needed for sleep    Psychophysiological insomnia       * Notice:  This list has 4 medication(s) that are the same as other medications prescribed for you. Read the directions carefully, and ask your doctor or other care provider to review them with you.

## 2018-11-30 NOTE — PATIENT INSTRUCTIONS
Physical therapy referral - You will receive a call to set up an appointment     Try Ambien at bedtime for sleep.    Schedule mammogram.    Sleep specialist referral - call to schedule an appointment.    Blood work today.    Counseling referral - schedule an appointment with Kandy Story NP-C

## 2018-12-01 ASSESSMENT — PATIENT HEALTH QUESTIONNAIRE - PHQ9: SUM OF ALL RESPONSES TO PHQ QUESTIONS 1-9: 13

## 2018-12-02 LAB — RHEUMATOID FACT SER NEPH-ACNC: <20 IU/ML (ref 0–20)

## 2018-12-03 ENCOUNTER — TELEPHONE (OUTPATIENT)
Dept: FAMILY MEDICINE | Facility: OTHER | Age: 43
End: 2018-12-03

## 2018-12-03 NOTE — LETTER
[facility]  [address]  [place] [address] [phone]      December 5, 2018    Radha Beckwith                                                                                                                     [address]  [place] [address]            Dear Radha,    This letter is to inform you that your labs are normal.     Results for orders placed or performed in visit on 11/30/18   Rheumatoid factor   Result Value Ref Range    Rheumatoid Factor <20 <20 IU/mL   TSH with free T4 reflex   Result Value Ref Range    TSH 1.88 0.40 - 4.00 mU/L   Comprehensive metabolic panel (BMP + Alb, Alk Phos, ALT, AST, Total. Bili, TP)   Result Value Ref Range    Sodium 136 133 - 144 mmol/L    Potassium 4.2 3.4 - 5.3 mmol/L    Chloride 101 94 - 109 mmol/L    Carbon Dioxide 28 20 - 32 mmol/L    Anion Gap 7 3 - 14 mmol/L    Glucose 96 70 - 99 mg/dL    Urea Nitrogen 9 7 - 30 mg/dL    Creatinine 0.67 0.52 - 1.04 mg/dL    GFR Estimate >90 >60 mL/min/1.7m2    GFR Estimate If Black >90 >60 mL/min/1.7m2    Calcium 8.8 8.5 - 10.1 mg/dL    Bilirubin Total 0.3 0.2 - 1.3 mg/dL    Albumin 3.6 3.4 - 5.0 g/dL    Protein Total 7.4 6.8 - 8.8 g/dL    Alkaline Phosphatase 79 40 - 150 U/L    ALT 30 0 - 50 U/L    AST 24 0 - 45 U/L   CBC with platelets   Result Value Ref Range    WBC 7.4 4.0 - 11.0 10e9/L    RBC Count 4.17 3.8 - 5.2 10e12/L    Hemoglobin 13.1 11.7 - 15.7 g/dL    Hematocrit 38.7 35.0 - 47.0 %    MCV 93 78 - 100 fl    MCH 31.4 26.5 - 33.0 pg    MCHC 33.9 31.5 - 36.5 g/dL    RDW 13.0 10.0 - 15.0 %    Platelet Count 218 150 - 450 10e9/L      Please call us at [phone] if you have any questions.    Sincerely,         Marisa Duval CNP

## 2018-12-03 NOTE — TELEPHONE ENCOUNTER
Left message for patient to return call. Please see message below.      Notes Recorded by Marisa Story APRN CNP on 12/3/2018 at 11:53 AM  Please call Linette and let her know all of her blood work is normal.  Thanks,  Marisa Story, CNP

## 2018-12-26 ENCOUNTER — TELEPHONE (OUTPATIENT)
Dept: FAMILY MEDICINE | Facility: CLINIC | Age: 43
End: 2018-12-26

## 2019-01-09 ENCOUNTER — TELEPHONE (OUTPATIENT)
Dept: FAMILY MEDICINE | Facility: OTHER | Age: 44
End: 2019-01-09

## 2019-01-09 PROBLEM — R87.612 PAPANICOLAOU SMEAR OF CERVIX WITH LOW GRADE SQUAMOUS INTRAEPITHELIAL LESION (LGSIL): Status: ACTIVE | Noted: 2017-07-07

## 2019-01-09 NOTE — TELEPHONE ENCOUNTER
Reason for Call:  Medication or medication refill:    Do you use a [facility]?  Name of the pharmacy and phone number for the current request:  [facility] [place] - [phone]    Name of the medication requested: RX for UTI    Other request: Patient called request to be prescribed rx for UTI. Process for appts declined x1.     Can we leave a detailed message on this number? YES    Phone number patient can be reached at: Home number on file [phone] (home)    Best Time: any    Call taken on 1/9/2019 at 2:52 PM by Elie Arredondo

## 2019-01-09 NOTE — TELEPHONE ENCOUNTER
Will need some sort of visit as she will need to leave a urine sample.   Please call.  NP may still have time for phone visit if she is able to come today?? Please review schedule.    Erick Nicolas RN, BSN

## 2019-01-11 ENCOUNTER — TELEPHONE (OUTPATIENT)
Dept: FAMILY MEDICINE | Facility: OTHER | Age: 44
End: 2019-01-11

## 2019-01-11 DIAGNOSIS — F51.04 PSYCHOPHYSIOLOGICAL INSOMNIA: ICD-10-CM

## 2019-01-11 DIAGNOSIS — F41.9 ANXIETY: ICD-10-CM

## 2019-01-11 NOTE — TELEPHONE ENCOUNTER
Seroquel  Routing refill request to provider for review/approval because:  Labs not current:  BRITTANY Stevens, RN, BSN

## 2019-01-14 RX ORDER — QUETIAPINE FUMARATE 200 MG/1
TABLET, FILM COATED ORAL
Qty: 30 TABLET | Refills: 0 | Status: SHIPPED | OUTPATIENT
Start: 2019-01-14 | End: 2019-02-11

## 2019-01-14 NOTE — TELEPHONE ENCOUNTER
Please call patient to have fasting cholesterol done. This needs to be monitored yearly because Seroquel can increase cholesterol levels. I refilled it for one month.    YARELIS PazC

## 2019-01-14 NOTE — TELEPHONE ENCOUNTER
Spoke with patient informed her of message below, patient will call back to schedule   Closing encounter  Jahaira Rivers RT (R)

## 2019-01-15 DIAGNOSIS — F41.9 ANXIETY: ICD-10-CM

## 2019-01-15 NOTE — TELEPHONE ENCOUNTER
Requested Prescriptions   Pending Prescriptions Disp Refills     LORazepam (ATIVAN) 1 MG tablet 30 tablet 1     Sig: Take 1 tablet (1 mg) by mouth daily as needed for anxiety (or panic attack. Use sparingly. Do not take before driving.)    There is no refill protocol information for this order        LORazepam (ATIVAN) 1 MG tablet      Last Written Prescription Date:  11/20/2018  Last Fill Quantity: 30,   # refills: 1  Last Office Visit: 11/30/2018  Future Office visit:       Routing refill request to provider for review/approval because:  Drug not on the G, P or [facility] refill protocol or controlled substance  Porsche Morse, RN, BSN

## 2019-01-16 RX ORDER — LORAZEPAM 1 MG/1
1 TABLET ORAL DAILY PRN
Qty: 30 TABLET | Refills: 1 | Status: SHIPPED | OUTPATIENT
Start: 2019-01-16 | End: 2019-03-21

## 2019-02-11 ENCOUNTER — TELEPHONE (OUTPATIENT)
Dept: LAB | Facility: OTHER | Age: 44
End: 2019-02-11

## 2019-02-11 DIAGNOSIS — E78.5 HYPERLIPIDEMIA: Primary | ICD-10-CM

## 2019-02-11 DIAGNOSIS — E78.5 HYPERLIPIDEMIA: ICD-10-CM

## 2019-02-11 DIAGNOSIS — F51.04 PSYCHOPHYSIOLOGICAL INSOMNIA: ICD-10-CM

## 2019-02-11 DIAGNOSIS — F41.9 ANXIETY: ICD-10-CM

## 2019-02-11 DIAGNOSIS — J30.2 SEASONAL ALLERGIC RHINITIS: ICD-10-CM

## 2019-02-11 LAB
CHOLEST SERPL-MCNC: 195 MG/DL
HDLC SERPL-MCNC: 22 MG/DL
LDLC SERPL CALC-MCNC: ABNORMAL MG/DL
LDLC SERPL DIRECT ASSAY-MCNC: 126 MG/DL
NONHDLC SERPL-MCNC: 173 MG/DL
TRIGL SERPL-MCNC: 409 MG/DL

## 2019-02-11 PROCEDURE — 83721 ASSAY OF BLOOD LIPOPROTEIN: CPT | Performed by: STUDENT IN AN ORGANIZED HEALTH CARE EDUCATION/TRAINING PROGRAM

## 2019-02-11 PROCEDURE — 36415 COLL VENOUS BLD VENIPUNCTURE: CPT | Performed by: STUDENT IN AN ORGANIZED HEALTH CARE EDUCATION/TRAINING PROGRAM

## 2019-02-11 PROCEDURE — 80061 LIPID PANEL: CPT | Performed by: STUDENT IN AN ORGANIZED HEALTH CARE EDUCATION/TRAINING PROGRAM

## 2019-02-11 RX ORDER — QUETIAPINE FUMARATE 200 MG/1
TABLET, FILM COATED ORAL
Qty: 90 TABLET | Refills: 1 | Status: SHIPPED | OUTPATIENT
Start: 2019-02-11 | End: 2019-08-13

## 2019-02-12 DIAGNOSIS — K21.9 GASTROESOPHAGEAL REFLUX DISEASE, ESOPHAGITIS PRESENCE NOT SPECIFIED: ICD-10-CM

## 2019-02-12 RX ORDER — CETIRIZINE HYDROCHLORIDE 10 MG/1
TABLET ORAL
Qty: 30 TABLET | Refills: 3 | Status: SHIPPED | OUTPATIENT
Start: 2019-02-12 | End: 2019-06-03

## 2019-02-12 NOTE — TELEPHONE ENCOUNTER
Cetirizine    Prescription approved per [facility] Refill Protocol.    Debra Ramon, RN, BSN

## 2019-02-13 NOTE — TELEPHONE ENCOUNTER
Omeprazole    Routing refill request to provider for review/approval because:  A break in medication  LOV 11/30/2018    Debra Ramon, RN, BSN

## 2019-02-14 ENCOUNTER — TELEPHONE (OUTPATIENT)
Dept: FAMILY MEDICINE | Facility: OTHER | Age: 44
End: 2019-02-14

## 2019-02-14 DIAGNOSIS — K21.9 GASTROESOPHAGEAL REFLUX DISEASE, ESOPHAGITIS PRESENCE NOT SPECIFIED: Primary | ICD-10-CM

## 2019-02-14 DIAGNOSIS — E78.2 MIXED HYPERLIPIDEMIA: Primary | ICD-10-CM

## 2019-02-14 RX ORDER — SIMVASTATIN 10 MG
10 TABLET ORAL AT BEDTIME
Qty: 90 TABLET | Refills: 3 | Status: SHIPPED | OUTPATIENT
Start: 2019-02-14 | End: 2020-03-05

## 2019-02-14 RX ORDER — NICOTINE POLACRILEX 4 MG/1
20 GUM, CHEWING ORAL 2 TIMES DAILY
Qty: 60 TABLET | Refills: 3 | Status: SHIPPED | OUTPATIENT
Start: 2019-02-14 | End: 2019-02-22

## 2019-02-14 NOTE — TELEPHONE ENCOUNTER
Prior Authorization Retail Medication Request    Medication/Dose: omeprazole  ICD code (if different than what is on RX):    Previously Tried and Failed:  unknown  Rationale:  Max 1 capsule/day    Insurance Name:  [employer] [place]  Insurance ID:  [id number]      Pharmacy Information (if different than what is on RX)  Name:    Phone:

## 2019-02-14 NOTE — TELEPHONE ENCOUNTER
"Spoke to patient and relayed message. Patient would like to start on medication. Patient uses [facility] pharmacy.        Notes recorded by Marisa Story, APRN CNP on 2/14/2019 at 1:08 PM CST  Please call patient and let her know her total cholesterol has come down nicely but her LDL \"bad\" cholesterol remains elevated and HDL cholesterol which we want to be high is very low. I would recommend a cholesterol medication to improve her levels. Her risk for heart attack or stroke over the next 10 years is 9.4% which is fairly high for her young age. I would put her on a medication called simvastatin if she is agreeable to this. If she does not want to do medication for cholesterol, I recommend working on diet and exercise to improve levels.  Marisa Story, NP-C  "

## 2019-02-20 NOTE — TELEPHONE ENCOUNTER
CENTRAL PRIOR AUTHORIZATION  [phone]    PA Initiation    Medication: omeprazole - INITAITED 02/20/2019  Insurance Company: [employer] - Phone [phone] Fax [phone]  Pharmacy Filling the Rx: [facility] [place] - [id number]  Filling Pharmacy Phone: [phone]  Filling Pharmacy Fax:    Start Date: 2/20/2019

## 2019-02-21 NOTE — TELEPHONE ENCOUNTER
PRIOR AUTHORIZATION DENIED    Medication: omeprazole - DENIED 02/20/2019    Denial Date: 2/20/2019    Denial Rational: YOUR DOSE CAN BE MADE WITH A LOWER NUMBER OF A HIGHER STRENGTH. FOR EXAMPLE, YOU CAN USE ONE 40 MG CAPSULE INSTEAD OF TWO 20 MG CAPSULES. YOUR MUST PRESCRIBE A HIGHER STRENGTH TO REACH YOUR DOSE. IF A HIGHER STRENGTH CANNOT BE USED, YOUR PRESCRIBER MUST PROVIDE THE REASON. YOU ARE ABLE TO GET UP TO ONE CAPSULE PER DAY FOR A MAXIMUM  DAYS WITHIN 365 DAYS. THIS DURATION MUST BE USED, YOUR PRESCRIBER MUST PROVIDE THE REASON.          Appeal Information: IF THE PROVIDER WOULD LIKE APPEAL THIS DENIAL, PLEASE HAVE THEM PROVIDE A LETTER OF MEDICAL NECESSITY ALONG WITH ANY DOCUMENTATION THAT STATES THERAPIES TRIED/OUTCOMES. ONCE IT HAS BEEN PLACED IN THE PATIENT'S CHART, PLEASE NOTIFY THE PA TEAM.

## 2019-02-22 RX ORDER — OMEPRAZOLE 40 MG/1
40 CAPSULE, DELAYED RELEASE ORAL DAILY
Qty: 30 CAPSULE | Refills: 3 | Status: SHIPPED | OUTPATIENT
Start: 2019-02-22 | End: 2019-06-03

## 2019-02-22 NOTE — TELEPHONE ENCOUNTER
Please call patient to notify that insurance PA stated they would cover the 40 mg dose. I sent in a script for the 40 mg dose.  Marisa Story, YARELISC

## 2019-03-20 DIAGNOSIS — F41.9 ANXIETY: ICD-10-CM

## 2019-03-21 RX ORDER — LORAZEPAM 1 MG/1
1 TABLET ORAL DAILY PRN
Qty: 30 TABLET | Refills: 1 | Status: SHIPPED | OUTPATIENT
Start: 2019-03-21 | End: 2019-05-29

## 2019-03-21 NOTE — TELEPHONE ENCOUNTER
Ativan      Last Written Prescription Date:  1/16/2019  Last Fill Quantity: 30,   # refills: 1  Last Office Visit: 11/30/2018  Future Office visit:       Routing refill request to provider for review/approval because:  Drug not on the [facility], [facility] or  Health refill protocol or controlled substance    Debra CONG Ramon, BSN

## 2019-04-01 ENCOUNTER — TELEPHONE (OUTPATIENT)
Dept: FAMILY MEDICINE | Facility: OTHER | Age: 44
End: 2019-04-01

## 2019-04-01 NOTE — TELEPHONE ENCOUNTER
Reason for call:  Patient reporting a symptom    Symptom or request: dizziness    Duration (how long have symptoms been present): 5 days     Have you been treated for this before? No    Additional comments: pt states for 5 days now has been feeling dizzy. Pt states when stands up she loses her balance. Please advise     Phone Number patient can be reached at:  Cell number on file:    Telephone Information:   Mobile [phone]       Best Time:  ANY    Can we leave a detailed message on this number:  YES    Call taken on 4/1/2019 at 10:31 AM by Jessica Vernon

## 2019-04-01 NOTE — PROGRESS NOTES
SUBJECTIVE:   Radha Beckwith is a 43 year old female who presents to clinic today for the following health issues:      HPI  Dizziness  Onset: 8 days    Description:   Do you feel faint:  no   Does it feel like the surroundings (bed, room) are moving: YES  Unsteady/off balance: YES  Have you passed out or fallen: no     Intensity: severe    Progression of Symptoms:  constant    Accompanying Signs & Symptoms:  Heart palpitations: no   Nausea, vomiting: YES  Weakness in arms or legs: YES right leg  Fatigue: YES  Vision or speech changes: YES speech  Ringing in ears (Tinnitus): no   Hearing Loss: no     History:   Head trauma/concussion hx: no   Previous similar symptoms: no   Recent bleeding history: no     Precipitating factors:   Worse with activity or head movement: YES  Any new medications (BP?): stopped sertraline 10 days ago restarted 6 days ago  Alcohol/drug abuse/withdrawal: no     Alleviating factors:   Does staying in a fixed position give relief:  no     Patient presents today to discuss dizziness. She reports symptoms started about 8 days ago. She states that she ran out of her Zoloft and that is when she first noticed symptoms. She was previously taking 100 mg of Zoloft daily. Talked with the pharmacist who also felt this was likely the cause and was able to get a refill. Patient reports the bottle said to take 2 tablets daily (200 mg) so she started this. She reports dizziness and nausea really increased at this time. She reports since being back on medication for 5 days symptoms have not been improving. She is concerned something else may be causing symptoms. She also reports her neck has been much more stuff than normal. She is requesting a refill of Flexeril to help with this.     Problem list and histories reviewed & adjusted, as indicated.  Additional history: as documented    Patient Active Problem List   Diagnosis     GERD (gastroesophageal reflux disease)     Restless legs     Anxiety      Ingrowing nail     Hyperlipidemia LDL goal <100     Hypokalemia     Atypical chest pain     Tobacco abuse     Anxiety attack     Opioid dependence in remission (H)     Papanicolaou smear of cervix with low grade squamous intraepithelial lesion (LGSIL)     S/P hysterectomy     Psychophysiological insomnia     Chronic bilateral low back pain without sciatica     Past Surgical History:   Procedure Laterality Date     BIOPSY CERVICAL, LOCAL EXCISION, SINGLE/MULTIPLE N/A 8/10/2017    Procedure: BIOPSY CERVICAL, LOCAL EXCISION, SINGLE/MULTIPLE;;  Surgeon: Michael Chandler MD;  Location: [facility]     COLPOSCOPY, BIOPSY, COMBINED N/A 8/10/2017    Procedure: COMBINED COLPOSCOPY, BIOPSY;  Colposcopy with Cervical Biopsies and Endometrial Biopsy, Exam with Ultrasound;  Surgeon: Michael Chandler MD;  Location: [facility]     ESOPHAGOSCOPY, GASTROSCOPY, DUODENOSCOPY (EGD), COMBINED N/A 4/17/2017    Procedure: COMBINED ESOPHAGOSCOPY, GASTROSCOPY, DUODENOSCOPY (EGD);  Surgeon: Ibrahima Esposito MD;  Location: [facility]     EXAM UNDER ANESTHESIA PELVIC N/A 8/10/2017    Procedure: EXAM UNDER ANESTHESIA PELVIC;;  Surgeon: Michael Chandler MD;  Location: [facility]     [facility] UGI ENDOSCOPY, SIMPLE EXAM  01/07/08     HYSTERECTOMY       LAPAROSCOPIC CHOLECYSTECTOMY N/A 2/2/2018    Procedure: LAPAROSCOPIC CHOLECYSTECTOMY;  Laparoscopic Cholecystectomy;  Surgeon: Tigre Lowry DO;  Location: [facility]     LAPAROSCOPIC HYSTERECTOMY TOTAL N/A 10/30/2017    Procedure: LAPAROSCOPIC HYSTERECTOMY TOTAL;  LAPAROSCOPIC HYSTERECTOMY TOTAL POSSIBLE SALPINGO-OOPHERECTOMY (BILATERAL);  Surgeon: Michael Chandler MD;  Location: [facility]       Social History     Tobacco Use     Smoking status: Current Every Day Smoker     Packs/day: 0.25     Years: 20.00     Pack years: 5.00     Types: Cigarettes     Smokeless tobacco: Never Used     Tobacco comment: 5-6 cigs daily   Substance Use Topics     Alcohol use: Yes     Comment:  occasional drinks     Family History   Problem Relation Age of Onset     Depression Mother      Respiratory Mother      Chronic Obstructive Pulmonary Disease Mother      Cerebrovascular Disease Father         Brain anyeurism     Adrenal Disorder Other      Chronic Obstructive Pulmonary Disease Other      Breast Cancer Cousin          Current Outpatient Medications   Medication Sig Dispense Refill     ACETAMINOPHEN PO Take 1,000 mg by mouth every 6 hours as needed for pain       buprenorphine-naloxone (SUBOXONE) 2-0.5 MG SUBL sublingual tablet Place 0.5 tablets under the tongue daily   0     cetirizine (ZYRTEC) 10 MG tablet TAKE ONE TABLET BY MOUTH EVERY EVENING 30 tablet 3     cyclobenzaprine (FLEXERIL) 5 MG tablet Take 1-2 tablets (5-10 mg) by mouth nightly as needed for muscle spasms 60 tablet 0     LORazepam (ATIVAN) 1 MG tablet Take 1 tablet (1 mg) by mouth daily as needed for anxiety (or panic attack. Use sparingly. Do not take before driving.) 30 tablet 1     metoclopramide (REGLAN) 10 MG tablet TAKE ONE TABLET BY MOUTH THREE TIMES A DAY AS NEEDED 90 tablet 0     omeprazole (PRILOSEC) 40 MG DR capsule Take 1 capsule (40 mg) by mouth daily 30 capsule 3     ondansetron (ZOFRAN) 4 MG tablet TAKE ONE TABLET BY MOUTH every 12 hours AS NEEDED FOR NAUSEA OR VOMITING 18 tablet 10     propranolol (INDERAL) 20 MG tablet TAKE ONE TABLET BY MOUTH TWICE A  tablet 0     QUEtiapine (SEROQUEL) 200 MG tablet TAKE ONE TABLET BY MOUTH EVERY NIGHT AT BEDTIME 90 tablet 1     rOPINIRole (REQUIP) 0.5 MG tablet Take 1 tablet (0.5 mg) by mouth At Bedtime 30 tablet 5     sertraline (ZOLOFT) 100 MG tablet TAKE TWO TABLETS BY MOUTH EVERY  tablet 1     simvastatin (ZOCOR) 10 MG tablet Take 1 tablet (10 mg) by mouth At Bedtime 90 tablet 3     zolpidem (AMBIEN) 5 MG tablet Take 1 tablet (5 mg) by mouth nightly as needed for sleep 30 tablet 5     order for DME Blood pressure cuff 1 each 0     rOPINIRole (REQUIP) 0.25 MG tablet  "Take 1 tablet (0.25 mg) by mouth At Bedtime (Patient not taking: Reported on 4/3/2019) 30 tablet 0     SUBOXONE 2-0.5 MG per film        Allergies   Allergen Reactions     Cafergot      12-            GI problems-     Seasonal Allergies      Sumatriptan      vomits after giving herself a shot     Compazine Anxiety     Droperidol Anxiety     Nubain [Nalbuphine Hcl] Anxiety     Prochlorperazine Palpitations     Uncontrolled movement     BP Readings from Last 3 Encounters:   04/03/19 118/78   11/30/18 118/74   11/20/18 124/86    Wt Readings from Last 3 Encounters:   04/03/19 75.8 kg (167 lb)   11/30/18 78.1 kg (172 lb 3.2 oz)   05/25/18 76.2 kg (168 lb)         ROS:  Constitutional, HEENT, cardiovascular, pulmonary, GI, , musculoskeletal, neuro, skin, endocrine and psych systems are negative, except as otherwise noted.    OBJECTIVE:     /78   Pulse 74   Temp 97.7  F (36.5  C)   Resp 16   Ht 1.651 m (5' 5\")   Wt 75.8 kg (167 lb)   LMP  (LMP Unknown)   SpO2 96%   BMI 27.79 kg/m    Body mass index is 27.79 kg/m .  GENERAL: healthy, alert and no distress  EYES: Eyes grossly normal to inspection, PERRL and conjunctivae and sclerae normal  HENT: ear canals and TM's normal, nose and mouth without ulcers or lesions  NECK: no adenopathy, no asymmetry, masses, or scars and thyroid normal to palpation  RESP: lungs clear to auscultation - no rales, rhonchi or wheezes  CV: regular rate and rhythm, normal S1 S2, no S3 or S4, no murmur, click or rub, no peripheral edema and peripheral pulses strong  ABDOMEN: soft, nontender, no hepatosplenomegaly, no masses and bowel sounds normal  SKIN: no suspicious lesions or rashes  NEURO: Normal strength and tone, sensory exam grossly normal, mentation intact, cranial nerves 2-12 intact and gait normal including heel/toe/tandem walking  PSYCH: mentation appears normal, affect normal/bright    Diagnostic Test Results:  Results for orders placed or performed in visit on " 04/03/19   Comprehensive metabolic panel (BMP + Alb, Alk Phos, ALT, AST, Total. Bili, TP)   Result Value Ref Range    Sodium 139 133 - 144 mmol/L    Potassium 4.7 3.4 - 5.3 mmol/L    Chloride 104 94 - 109 mmol/L    Carbon Dioxide 29 20 - 32 mmol/L    Anion Gap 6 3 - 14 mmol/L    Glucose 99 70 - 99 mg/dL    Urea Nitrogen 13 7 - 30 mg/dL    Creatinine 0.74 0.52 - 1.04 mg/dL    GFR Estimate >90 >60 mL/min/[1.73_m2]    GFR Estimate If Black >90 >60 mL/min/[1.73_m2]    Calcium 9.3 8.5 - 10.1 mg/dL    Bilirubin Total 0.4 0.2 - 1.3 mg/dL    Albumin 4.0 3.4 - 5.0 g/dL    Protein Total 8.1 6.8 - 8.8 g/dL    Alkaline Phosphatase 94 40 - 150 U/L    ALT 34 0 - 50 U/L    AST 27 0 - 45 U/L   TSH with free T4 reflex   Result Value Ref Range    TSH 1.91 0.40 - 4.00 mU/L   CBC with platelets and differential   Result Value Ref Range    WBC 8.1 4.0 - 11.0 10e9/L    RBC Count 4.87 3.8 - 5.2 10e12/L    Hemoglobin 14.9 11.7 - 15.7 g/dL    Hematocrit 44.5 35.0 - 47.0 %    MCV 91 78 - 100 fl    MCH 30.6 26.5 - 33.0 pg    MCHC 33.5 31.5 - 36.5 g/dL    RDW 13.4 10.0 - 15.0 %    Platelet Count 274 150 - 450 10e9/L    % Neutrophils 56.8 %    % Lymphocytes 33.9 %    % Monocytes 6.8 %    % Eosinophils 2.1 %    % Basophils 0.4 %    Absolute Neutrophil 4.6 1.6 - 8.3 10e9/L    Absolute Lymphocytes 2.7 0.8 - 5.3 10e9/L    Absolute Monocytes 0.6 0.0 - 1.3 10e9/L    Absolute Eosinophils 0.2 0.0 - 0.7 10e9/L    Absolute Basophils 0.0 0.0 - 0.2 10e9/L    Diff Method Automated Method        ASSESSMENT/PLAN:     1. Dizziness  Discussed with patient that I suspect symptoms related to Zoloft. First when she ran out and then worsening by increasing dose too quickly. Recommended patient start at 50 or 100 mg and then slowly increase to 200 mg if anxiety not well controlled. I also suspect neck strain adding to dizziness a bit as well. Recommended gentle stretching, ice/heat and flexeril as needed. Patient is concerned there is something else causing  symptoms. Will therefore assess labs as below. Discussed close follow-up in 1-2 weeks if symptoms fail to improve.   - Comprehensive metabolic panel (BMP + Alb, Alk Phos, ALT, AST, Total. Bili, TP)  - TSH with free T4 reflex  - CBC with platelets and differential    2. Chronic neck pain  - cyclobenzaprine (FLEXERIL) 5 MG tablet; Take 1-2 tablets (5-10 mg) by mouth nightly as needed for muscle spasms  Dispense: 60 tablet; Refill: 0    3. Anxiety  Patient restarted on Zoloft. Will follow-up in 1-2 weeks if symptoms not improving.     The patient indicates understanding of these issues and agrees with the plan.    Jenny Fatima PA-C  [facility]

## 2019-04-01 NOTE — TELEPHONE ENCOUNTER
Radha Beckwith is a 43 year old female who calls with dizziness.    NURSING ASSESSMENT:  Description:  Feels like sinus, but unsure.  Started after she couldn't poop.  So she took laxatives and mag citrate.  Still no outcome then gave herself an enema and did go.  Then the next day she felt dizzy.  Has been trying to increase fluids, but doesn't seem to be improving.  Will get hot and sweaty when just sitting. Temp 96.5 lower for her. Mouth feels like tin.    Onset/duration:  Started 5 days ago  Associated symptoms:  It's off balance, stumble a little, no spinning. Head feels weird and lightheaded. No pain today right leg feels funny.  No pain or warm.  Walking ok.    Allergies:   Allergies   Allergen Reactions     Cafergot      12-            GI problems-     Seasonal Allergies      Sumatriptan      vomits after giving herself a shot     Compazine Anxiety     Droperidol Anxiety     Nubain [Nalbuphine Hcl] Anxiety     Prochlorperazine Palpitations     Uncontrolled movement       MEDICATIONS:   Taking medication(s) as prescribed? Yes  Taking over the counter medication(s?) Yes  Any medication side effects? No significant side effects    Any barriers to taking medication(s) as prescribed?  No  Medication(s) improving/managing symptoms?  No  Medication reconciliation completed: Yes      NURSING PLAN: Nursing advice to patient needs to be seen    RECOMMENDED DISPOSITION:  See in 24 hours - declined OV today will go to UC/ED if worsening.    Next 5 appointments (look out 90 days)    Apr 03, 2019  1:00 PM CDT  Office Visit with Jenny Fatima PA-C  [facility] ([facility] [address] [facility] [id number] [phone]          Will comply with recommendation: Yes  If further questions/concerns or if symptoms do not improve, worsen or new symptoms develop, call your PCP or [facility] as soon as possible.      Guideline used:  Telephone Triage Protocols  for Nurses, Fifth Edition, Jyoti Nicolas, RN, BSN

## 2019-04-03 ENCOUNTER — OFFICE VISIT (OUTPATIENT)
Dept: FAMILY MEDICINE | Facility: OTHER | Age: 44
End: 2019-04-03
Payer: COMMERCIAL

## 2019-04-03 VITALS
RESPIRATION RATE: 16 BRPM | WEIGHT: 167 LBS | SYSTOLIC BLOOD PRESSURE: 118 MMHG | OXYGEN SATURATION: 96 % | HEART RATE: 74 BPM | DIASTOLIC BLOOD PRESSURE: 78 MMHG | TEMPERATURE: 97.7 F | HEIGHT: 65 IN | BODY MASS INDEX: 27.82 KG/M2

## 2019-04-03 DIAGNOSIS — F41.9 ANXIETY: ICD-10-CM

## 2019-04-03 DIAGNOSIS — G89.29 CHRONIC NECK PAIN: ICD-10-CM

## 2019-04-03 DIAGNOSIS — R42 DIZZINESS: Primary | ICD-10-CM

## 2019-04-03 DIAGNOSIS — M54.2 CHRONIC NECK PAIN: ICD-10-CM

## 2019-04-03 LAB
ALBUMIN SERPL-MCNC: 4 G/DL (ref 3.4–5)
ALP SERPL-CCNC: 94 U/L (ref 40–150)
ALT SERPL W P-5'-P-CCNC: 34 U/L (ref 0–50)
ANION GAP SERPL CALCULATED.3IONS-SCNC: 6 MMOL/L (ref 3–14)
AST SERPL W P-5'-P-CCNC: 27 U/L (ref 0–45)
BASOPHILS # BLD AUTO: 0 10E9/L (ref 0–0.2)
BASOPHILS NFR BLD AUTO: 0.4 %
BILIRUB SERPL-MCNC: 0.4 MG/DL (ref 0.2–1.3)
BUN SERPL-MCNC: 13 MG/DL (ref 7–30)
CALCIUM SERPL-MCNC: 9.3 MG/DL (ref 8.5–10.1)
CHLORIDE SERPL-SCNC: 104 MMOL/L (ref 94–109)
CO2 SERPL-SCNC: 29 MMOL/L (ref 20–32)
CREAT SERPL-MCNC: 0.74 MG/DL (ref 0.52–1.04)
DIFFERENTIAL METHOD BLD: NORMAL
EOSINOPHIL # BLD AUTO: 0.2 10E9/L (ref 0–0.7)
EOSINOPHIL NFR BLD AUTO: 2.1 %
ERYTHROCYTE [DISTWIDTH] IN BLOOD BY AUTOMATED COUNT: 13.4 % (ref 10–15)
GFR SERPL CREATININE-BSD FRML MDRD: >90 ML/MIN/{1.73_M2}
GLUCOSE SERPL-MCNC: 99 MG/DL (ref 70–99)
HCT VFR BLD AUTO: 44.5 % (ref 35–47)
HGB BLD-MCNC: 14.9 G/DL (ref 11.7–15.7)
LYMPHOCYTES # BLD AUTO: 2.7 10E9/L (ref 0.8–5.3)
LYMPHOCYTES NFR BLD AUTO: 33.9 %
MCH RBC QN AUTO: 30.6 PG (ref 26.5–33)
MCHC RBC AUTO-ENTMCNC: 33.5 G/DL (ref 31.5–36.5)
MCV RBC AUTO: 91 FL (ref 78–100)
MONOCYTES # BLD AUTO: 0.6 10E9/L (ref 0–1.3)
MONOCYTES NFR BLD AUTO: 6.8 %
NEUTROPHILS # BLD AUTO: 4.6 10E9/L (ref 1.6–8.3)
NEUTROPHILS NFR BLD AUTO: 56.8 %
PLATELET # BLD AUTO: 274 10E9/L (ref 150–450)
POTASSIUM SERPL-SCNC: 4.7 MMOL/L (ref 3.4–5.3)
PROT SERPL-MCNC: 8.1 G/DL (ref 6.8–8.8)
RBC # BLD AUTO: 4.87 10E12/L (ref 3.8–5.2)
SODIUM SERPL-SCNC: 139 MMOL/L (ref 133–144)
TSH SERPL DL<=0.005 MIU/L-ACNC: 1.91 MU/L (ref 0.4–4)
WBC # BLD AUTO: 8.1 10E9/L (ref 4–11)

## 2019-04-03 PROCEDURE — 99214 OFFICE O/P EST MOD 30 MIN: CPT | Performed by: PHYSICIAN ASSISTANT

## 2019-04-03 PROCEDURE — 85025 COMPLETE CBC W/AUTO DIFF WBC: CPT | Performed by: PHYSICIAN ASSISTANT

## 2019-04-03 PROCEDURE — 80053 COMPREHEN METABOLIC PANEL: CPT | Performed by: PHYSICIAN ASSISTANT

## 2019-04-03 PROCEDURE — 84443 ASSAY THYROID STIM HORMONE: CPT | Performed by: PHYSICIAN ASSISTANT

## 2019-04-03 PROCEDURE — 36415 COLL VENOUS BLD VENIPUNCTURE: CPT | Performed by: PHYSICIAN ASSISTANT

## 2019-04-03 RX ORDER — CYCLOBENZAPRINE HCL 5 MG
5-10 TABLET ORAL
Qty: 60 TABLET | Refills: 0 | Status: SHIPPED | OUTPATIENT
Start: 2019-04-03 | End: 2019-05-09

## 2019-04-03 ASSESSMENT — ANXIETY QUESTIONNAIRES
7. FEELING AFRAID AS IF SOMETHING AWFUL MIGHT HAPPEN: NEARLY EVERY DAY
7. FEELING AFRAID AS IF SOMETHING AWFUL MIGHT HAPPEN: NEARLY EVERY DAY
5. BEING SO RESTLESS THAT IT IS HARD TO SIT STILL: NOT AT ALL
GAD7 TOTAL SCORE: 14
GAD7 TOTAL SCORE: 14
4. TROUBLE RELAXING: NOT AT ALL
6. BECOMING EASILY ANNOYED OR IRRITABLE: NEARLY EVERY DAY
3. WORRYING TOO MUCH ABOUT DIFFERENT THINGS: NEARLY EVERY DAY
1. FEELING NERVOUS, ANXIOUS, OR ON EDGE: NEARLY EVERY DAY
2. NOT BEING ABLE TO STOP OR CONTROL WORRYING: MORE THAN HALF THE DAYS
GAD7 TOTAL SCORE: 14

## 2019-04-03 ASSESSMENT — PATIENT HEALTH QUESTIONNAIRE - PHQ9
10. IF YOU CHECKED OFF ANY PROBLEMS, HOW DIFFICULT HAVE THESE PROBLEMS MADE IT FOR YOU TO DO YOUR WORK, TAKE CARE OF THINGS AT HOME, OR GET ALONG WITH OTHER PEOPLE: VERY DIFFICULT
SUM OF ALL RESPONSES TO PHQ QUESTIONS 1-9: 13
SUM OF ALL RESPONSES TO PHQ QUESTIONS 1-9: 13

## 2019-04-03 ASSESSMENT — PAIN SCALES - GENERAL: PAINLEVEL: SEVERE PAIN (7)

## 2019-04-03 ASSESSMENT — MIFFLIN-ST. JEOR: SCORE: 1403.39

## 2019-04-04 ASSESSMENT — PATIENT HEALTH QUESTIONNAIRE - PHQ9: SUM OF ALL RESPONSES TO PHQ QUESTIONS 1-9: 13

## 2019-04-04 ASSESSMENT — ANXIETY QUESTIONNAIRES: GAD7 TOTAL SCORE: 14

## 2019-04-04 NOTE — RESULT ENCOUNTER NOTE
Please notify patient that all of her lab work was normal. I would expect the dizziness to improve after cutting back on the Sertraline.     Jenny Fatima PA-C

## 2019-05-08 DIAGNOSIS — M54.2 CHRONIC NECK PAIN: ICD-10-CM

## 2019-05-08 DIAGNOSIS — G89.29 CHRONIC NECK PAIN: ICD-10-CM

## 2019-05-08 NOTE — TELEPHONE ENCOUNTER
Flexeril      Last Written Prescription Date:  4/3/2019  Last Fill Quantity: 60 tablets,   # refills: 0  Last Office Visit: 4/3/2019  Future Office visit:       Routing refill request to provider for review/approval because:  Drug not on the [facility], [facility] or [facility] refill protocol or controlled substance    Jenn Lal RN, BSN

## 2019-05-09 RX ORDER — CYCLOBENZAPRINE HCL 5 MG
5-10 TABLET ORAL
Qty: 60 TABLET | Refills: 0 | Status: SHIPPED | OUTPATIENT
Start: 2019-05-09 | End: 2019-06-03

## 2019-05-28 DIAGNOSIS — F41.9 ANXIETY: ICD-10-CM

## 2019-05-28 DIAGNOSIS — F51.04 PSYCHOPHYSIOLOGICAL INSOMNIA: ICD-10-CM

## 2019-05-28 DIAGNOSIS — G25.81 RESTLESS LEGS SYNDROME (RLS): ICD-10-CM

## 2019-05-29 RX ORDER — ZOLPIDEM TARTRATE 5 MG/1
5 TABLET ORAL
Qty: 30 TABLET | Refills: 5 | OUTPATIENT
Start: 2019-05-29

## 2019-05-29 RX ORDER — ROPINIROLE 0.5 MG/1
TABLET, FILM COATED ORAL
Qty: 90 TABLET | Refills: 2 | Status: SHIPPED | OUTPATIENT
Start: 2019-05-29 | End: 2020-05-14

## 2019-05-29 RX ORDER — LORAZEPAM 1 MG/1
1 TABLET ORAL DAILY PRN
Qty: 7 TABLET | Refills: 0 | Status: SHIPPED | OUTPATIENT
Start: 2019-05-29 | End: 2019-06-03

## 2019-05-29 RX ORDER — ZOLPIDEM TARTRATE 5 MG/1
5 TABLET ORAL
Qty: 7 TABLET | Refills: 0 | Status: SHIPPED | OUTPATIENT
Start: 2019-05-29 | End: 2019-06-03

## 2019-05-29 RX ORDER — LORAZEPAM 1 MG/1
1 TABLET ORAL DAILY PRN
Qty: 30 TABLET | Refills: 1 | OUTPATIENT
Start: 2019-05-29

## 2019-05-29 NOTE — PROGRESS NOTES
Subjective     Radha Beckwith is a 43 year old female who presents to clinic today for the following health issues:    History of Present Illness     Mental Health Follow-up:  Patient presents to follow-up on Depression & Anxiety.Patient's depression since last visit has been:  No change  The patient is not having other symptoms associated with depression.  Patient's anxiety since last visit has been:  Worse  The patient is having other symptoms associated with anxiety.  Any significant life events: financial concerns, housing concerns and grief or loss  Patient is feeling anxious or having panic attacks.  Patient has no concerns about alcohol or drug use.     Social History  Tobacco Use    Smoking status: Current Every Day Smoker      Packs/day: 0.25      Years: 20.00      Pack years: 5      Types: Cigarettes    Smokeless tobacco: Never Used    Tobacco comment: 5-6 cigs daily  Alcohol use: Yes    Comment: occasional drinks  Drug use: No      Today's PHQ-9         PHQ-9 Total Score:     (P) 13   PHQ-9 Q9 Thoughts of better off dead/self-harm past 2 weeks :   (P) Not at all   Thoughts of suicide or self harm:      Self-harm Plan:        Self-harm Action:          Safety concerns for self or others:           She eats 2-3 servings of fruits and vegetables daily.She consumes 0 sweetened beverage(s) daily.  She is taking medications regularly.     Continues to have high amount of stress caring for her mom who has end-stage COPD.  She states the only time that she has alone is when she is in the car on her way to work.  Her mom calls her frequently at work.  She lives in the basement of their house and when she goes downstairs to pay bills for instance her mom will call her frequently asking her what is taking her so long.  Her mom has high anxiety due to her difficulty with breathing and is dependent on her for assistance with ADLs.  Radha has a son who is in the .  She was supposed to see him in July but  now this was postponed to September.  She has a great relationship with her son who is very supportive and understanding of the relationship she has with her mother.  Radha has been taking lorazepam generally once daily for anxiety and panic attacks usually related to her mom.  She had a drug screen done in February with her buprenorphine.  She recently ran out of Ambien and had a very hard time sleeping only sleeping a couple hours at night.  She got the short prescription of Ambien to get her through to the appointment and she slept very well after restarting the medication.  She is not having any side effects of medication.  She was seen recently for dizziness after she ran out of Zoloft.  She had restarted back on 200 mg daily when she refilled the prescription and it was thought that because she went to such a high dose after restarting after 1 week it caused her dizziness.  She cut down to a smaller dose and now is taking 100 mg daily and feels that is working well for her mood.  She continues to take Seroquel for sleep and mood.  She is also taking ropinirole for restless leg syndrome and this is working well.  She takes Flexeril as needed for neck and upper back pain.  This flares intermittently.  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  She takes omeprazole daily for reflux and this is stable.      Patient Active Problem List   Diagnosis     GERD (gastroesophageal reflux disease)     Restless legs     Anxiety     Ingrowing nail     Hyperlipidemia LDL goal <100     Hypokalemia     Atypical chest pain     Tobacco abuse     Anxiety attack     Opioid dependence in remission (H)     Papanicolaou smear of cervix with low grade squamous intraepithelial lesion (LGSIL)     S/P hysterectomy     Psychophysiological insomnia     Chronic bilateral low back pain without sciatica     Moderate major depression (H)     Supraventricular  tachycardia (H)     Past Surgical History:   Procedure Laterality Date     BIOPSY CERVICAL, LOCAL EXCISION, SINGLE/MULTIPLE N/A 8/10/2017    Procedure: BIOPSY CERVICAL, LOCAL EXCISION, SINGLE/MULTIPLE;;  Surgeon: Michael Chandler MD;  Location: [facility]     COLPOSCOPY, BIOPSY, COMBINED N/A 8/10/2017    Procedure: COMBINED COLPOSCOPY, BIOPSY;  Colposcopy with Cervical Biopsies and Endometrial Biopsy, Exam with Ultrasound;  Surgeon: Michael Chandler MD;  Location: [facility]     ESOPHAGOSCOPY, GASTROSCOPY, DUODENOSCOPY (EGD), COMBINED N/A 4/17/2017    Procedure: COMBINED ESOPHAGOSCOPY, GASTROSCOPY, DUODENOSCOPY (EGD);  Surgeon: Ibrahima Esposito MD;  Location: [facility]     EXAM UNDER ANESTHESIA PELVIC N/A 8/10/2017    Procedure: EXAM UNDER ANESTHESIA PELVIC;;  Surgeon: Michael Chandler MD;  Location: [facility] OR     HC UGI ENDOSCOPY, SIMPLE EXAM  01/07/08     HYSTERECTOMY       LAPAROSCOPIC CHOLECYSTECTOMY N/A 2/2/2018    Procedure: LAPAROSCOPIC CHOLECYSTECTOMY;  Laparoscopic Cholecystectomy;  Surgeon: Tigre Lowry DO;  Location: [facility]     LAPAROSCOPIC HYSTERECTOMY TOTAL N/A 10/30/2017    Procedure: LAPAROSCOPIC HYSTERECTOMY TOTAL;  LAPAROSCOPIC HYSTERECTOMY TOTAL POSSIBLE SALPINGO-OOPHERECTOMY (BILATERAL);  Surgeon: Michael Chandler MD;  Location: [facility] OR       Social History     Tobacco Use     Smoking status: Current Every Day Smoker     Packs/day: 0.25     Years: 20.00     Pack years: 5.00     Types: Cigarettes     Smokeless tobacco: Never Used     Tobacco comment: 5-6 cigs daily   Substance Use Topics     Alcohol use: Yes     Comment: occasional drinks     Family History   Problem Relation Age of Onset     Depression Mother      Respiratory Mother      Chronic Obstructive Pulmonary Disease Mother      Cerebrovascular Disease Father         Brain anyeurism     Adrenal Disorder Other      Chronic Obstructive Pulmonary Disease Other      Breast Cancer Cousin          Current  Outpatient Medications   Medication Sig Dispense Refill     ACETAMINOPHEN PO Take 1,000 mg by mouth every 6 hours as needed for pain       buprenorphine-naloxone (SUBOXONE) 2-0.5 MG SUBL sublingual tablet Place 0.5 tablets under the tongue daily   0     cetirizine (ZYRTEC) 10 MG tablet Take 1 tablet (10 mg) by mouth every morning 30 tablet 3     cyclobenzaprine (FLEXERIL) 5 MG tablet Take 1-2 tablets (5-10 mg) by mouth nightly as needed for muscle spasms 60 tablet 3     LORazepam (ATIVAN) 1 MG tablet Take 1 tablet (1 mg) by mouth daily as needed for anxiety (or panic attack. Use sparingly. Do not take before driving.) 30 tablets to last 30 days 30 tablet 5     metoclopramide (REGLAN) 10 MG tablet TAKE ONE TABLET BY MOUTH THREE TIMES A DAY AS NEEDED 90 tablet 0     omeprazole (PRILOSEC) 40 MG DR capsule Take 1 capsule (40 mg) by mouth daily 30 capsule 3     ondansetron (ZOFRAN) 4 MG tablet TAKE ONE TABLET BY MOUTH every 12 hours AS NEEDED FOR NAUSEA OR VOMITING 18 tablet 10     propranolol (INDERAL) 20 MG tablet Take 1 tablet (20 mg) by mouth 2 times daily 180 tablet 3     QUEtiapine (SEROQUEL) 200 MG tablet TAKE ONE TABLET BY MOUTH EVERY NIGHT AT BEDTIME 90 tablet 1     rOPINIRole (REQUIP) 0.5 MG tablet TAKE ONE TABLET BY MOUTH EVERY NIGHT AT BEDTIME 90 tablet 2     sertraline (ZOLOFT) 100 MG tablet TAKE one TABLET BY MOUTH EVERY DAY 90 tablet 3     simvastatin (ZOCOR) 10 MG tablet Take 1 tablet (10 mg) by mouth At Bedtime 90 tablet 3     SUBOXONE 2-0.5 MG per film        zolpidem (AMBIEN) 5 MG tablet Take 1 tablet (5 mg) by mouth nightly as needed for sleep 30 tablet 5     order for DME Blood pressure cuff (Patient not taking: Reported on 6/3/2019) 1 each 0     Allergies   Allergen Reactions     Cafergot      12-            GI problems-     Seasonal Allergies      Sumatriptan      vomits after giving herself a shot     Compazine Anxiety     Droperidol Anxiety     Nubain [Nalbuphine Hcl] Anxiety      "Prochlorperazine Palpitations     Uncontrolled movement     BP Readings from Last 3 Encounters:   06/03/19 132/66   04/03/19 118/78   11/30/18 118/74    Wt Readings from Last 3 Encounters:   06/03/19 77.8 kg (171 lb 8 oz)   04/03/19 75.8 kg (167 lb)   11/30/18 78.1 kg (172 lb 3.2 oz)                    Reviewed and updated as needed this visit by Provider  Tobacco  Allergies  Meds  Problems  Med Hx  Surg Hx  Fam Hx         Review of Systems   ROS COMP: Constitutional, HEENT, cardiovascular, pulmonary, GI, , musculoskeletal, neuro, skin, endocrine and psych systems are negative, except as otherwise noted.      Objective    /66   Pulse 64   Temp 97.6  F (36.4  C) (Temporal)   Resp 12   Ht 1.651 m (5' 5\")   Wt 77.8 kg (171 lb 8 oz)   LMP  (LMP Unknown)   BMI 28.54 kg/m    Body mass index is 28.54 kg/m .  Physical Exam   GENERAL: alert and mildly anxious  EYES: Eyes grossly normal to inspection, PERRL and conjunctivae and sclerae normal  NECK: no adenopathy, no asymmetry, masses, or scars and thyroid normal to palpation  RESP: lungs clear to auscultation - no rales, rhonchi or wheezes  CV: regular rate and rhythm, normal S1 S2, no S3 or S4, no murmur, click or rub  MS: no gross musculoskeletal defects noted, no edema  SKIN: no suspicious lesions or rashes  NEURO: Normal strength and tone, mentation intact and speech normal  PSYCH: mentation appears normal, anxious, judgement and insight intact and appearance well groomed    Diagnostic Test Results:  Labs reviewed in Epic        Assessment & Plan     1. Anxiety  Stable, chronic ongoing due to situational stressors with caring for her mother who has end-stage COPD.  Reviewed Medications with Her and Discuss she may continue to use lorazepam as needed daily for anxiety or panic attack.  Authorized 5 refills but 30 tablets must last 30 days.  Patient verbalized understanding.  She had a drug screen in February with her provider who prescribes " buprenorphine and the results were as expected.  - LORazepam (ATIVAN) 1 MG tablet; Take 1 tablet (1 mg) by mouth daily as needed for anxiety (or panic attack. Use sparingly. Do not take before driving.) 30 tablets to last 30 days  Dispense: 30 tablet; Refill: 5  - sertraline (ZOLOFT) 100 MG tablet; TAKE one TABLET BY MOUTH EVERY DAY  Dispense: 90 tablet; Refill: 3  - propranolol (INDERAL) 20 MG tablet; Take 1 tablet (20 mg) by mouth 2 times daily  Dispense: 180 tablet; Refill: 3    2. Psychophysiological insomnia  Stable.  Continue current medication.  Patient having no side effects.  - zolpidem (AMBIEN) 5 MG tablet; Take 1 tablet (5 mg) by mouth nightly as needed for sleep  Dispense: 30 tablet; Refill: 5    3. Seasonal allergic rhinitis, unspecified trigger  Stable.  Continue current medication  - cetirizine (ZYRTEC) 10 MG tablet; Take 1 tablet (10 mg) by mouth every morning  Dispense: 30 tablet; Refill: 3    4. Gastroesophageal reflux disease, esophagitis presence not specified  Stable.  Continue current medication  - omeprazole (PRILOSEC) 40 MG DR capsule; Take 1 capsule (40 mg) by mouth daily  Dispense: 30 capsule; Refill: 3    5. Moderate major depression (H)  PHQ-9 SCORE 11/30/2018 4/3/2019 6/3/2019   PHQ-9 Total Score [facility] 13 (Moderate depression) 13 (Moderate depression) 13 (Moderate depression)   PHQ-9 Total Score 13 13 13   Stable. Continue current medication(s) and dose(s).     6. Chronic neck pain  Intermittent. Using Flexeril as needed. Refills granted.   - cyclobenzaprine (FLEXERIL) 5 MG tablet; Take 1-2 tablets (5-10 mg) by mouth nightly as needed for muscle spasms  Dispense: 60 tablet; Refill: 3    7. Opioid dependence in remission (H)  Follows with Dr. Gunter for buprenorphine    8. Supraventricular tachycardia (H)  9. Sinus tachycardia  Stable. Continue current medication(s) and dose(s).   - propranolol (INDERAL) 20 MG tablet; Take 1 tablet (20 mg) by mouth 2 times daily  Dispense: 180  tablet; Refill: 3     Tobacco Cessation:   reports that she has been smoking cigarettes.  She has a 5.00 pack-year smoking history. She has never used smokeless tobacco.  Tobacco Cessation Action Plan: Information offered: Patient not interested at this time      Return in about 6 months (around 12/3/2019) for Anxiety, Depression.    JEANNIE Cano [facility]

## 2019-05-29 NOTE — TELEPHONE ENCOUNTER
Called patient and she is scheduled for Monday with Florecita. She is wondering if there is anyway she can get a refill to get to her appointment, she is completely out and did not realize she needed an appointment for next refill.     Regino Rascon,

## 2019-05-29 NOTE — TELEPHONE ENCOUNTER
Requip  Prescription approved per [facility] Refill Protocol.    Christiana Stevens, RN, BSN

## 2019-05-29 NOTE — TELEPHONE ENCOUNTER
Gave 7 day refill. Please drop at our pharmacy drop box. Prescriptions in MA task.  YARELIS PazC

## 2019-06-03 ENCOUNTER — OFFICE VISIT (OUTPATIENT)
Dept: FAMILY MEDICINE | Facility: OTHER | Age: 44
End: 2019-06-03
Payer: COMMERCIAL

## 2019-06-03 VITALS
SYSTOLIC BLOOD PRESSURE: 132 MMHG | WEIGHT: 171.5 LBS | RESPIRATION RATE: 12 BRPM | HEIGHT: 65 IN | BODY MASS INDEX: 28.57 KG/M2 | DIASTOLIC BLOOD PRESSURE: 66 MMHG | TEMPERATURE: 97.6 F | HEART RATE: 64 BPM

## 2019-06-03 DIAGNOSIS — F41.9 ANXIETY: ICD-10-CM

## 2019-06-03 DIAGNOSIS — F51.04 PSYCHOPHYSIOLOGICAL INSOMNIA: ICD-10-CM

## 2019-06-03 DIAGNOSIS — G89.29 CHRONIC NECK PAIN: ICD-10-CM

## 2019-06-03 DIAGNOSIS — F32.1 MODERATE MAJOR DEPRESSION (H): ICD-10-CM

## 2019-06-03 DIAGNOSIS — R00.0 SINUS TACHYCARDIA: ICD-10-CM

## 2019-06-03 DIAGNOSIS — I47.10 SUPRAVENTRICULAR TACHYCARDIA (H): ICD-10-CM

## 2019-06-03 DIAGNOSIS — J30.2 SEASONAL ALLERGIC RHINITIS, UNSPECIFIED TRIGGER: ICD-10-CM

## 2019-06-03 DIAGNOSIS — M54.2 CHRONIC NECK PAIN: ICD-10-CM

## 2019-06-03 DIAGNOSIS — F11.21 OPIOID DEPENDENCE IN REMISSION (H): ICD-10-CM

## 2019-06-03 DIAGNOSIS — K21.9 GASTROESOPHAGEAL REFLUX DISEASE, ESOPHAGITIS PRESENCE NOT SPECIFIED: ICD-10-CM

## 2019-06-03 PROCEDURE — 99214 OFFICE O/P EST MOD 30 MIN: CPT | Performed by: STUDENT IN AN ORGANIZED HEALTH CARE EDUCATION/TRAINING PROGRAM

## 2019-06-03 RX ORDER — ZOLPIDEM TARTRATE 5 MG/1
5 TABLET ORAL
Qty: 30 TABLET | Refills: 5 | Status: SHIPPED | OUTPATIENT
Start: 2019-06-03 | End: 2019-11-21

## 2019-06-03 RX ORDER — CYCLOBENZAPRINE HCL 5 MG
5-10 TABLET ORAL
Qty: 60 TABLET | Refills: 3 | Status: SHIPPED | OUTPATIENT
Start: 2019-06-03 | End: 2019-10-18

## 2019-06-03 RX ORDER — PROPRANOLOL HYDROCHLORIDE 20 MG/1
20 TABLET ORAL 2 TIMES DAILY
Qty: 180 TABLET | Refills: 3 | Status: SHIPPED | OUTPATIENT
Start: 2019-06-03 | End: 2020-10-16

## 2019-06-03 RX ORDER — LORAZEPAM 1 MG/1
1 TABLET ORAL DAILY PRN
Qty: 30 TABLET | Refills: 5 | Status: SHIPPED | OUTPATIENT
Start: 2019-06-03 | End: 2019-12-18

## 2019-06-03 RX ORDER — OMEPRAZOLE 40 MG/1
40 CAPSULE, DELAYED RELEASE ORAL DAILY
Qty: 30 CAPSULE | Refills: 3 | Status: SHIPPED | OUTPATIENT
Start: 2019-06-03 | End: 2020-06-11

## 2019-06-03 RX ORDER — SERTRALINE HYDROCHLORIDE 100 MG/1
TABLET, FILM COATED ORAL
Qty: 90 TABLET | Refills: 3 | Status: SHIPPED | OUTPATIENT
Start: 2019-06-03 | End: 2020-05-20

## 2019-06-03 RX ORDER — CETIRIZINE HYDROCHLORIDE 10 MG/1
10 TABLET ORAL EVERY MORNING
Qty: 30 TABLET | Refills: 3 | Status: SHIPPED | OUTPATIENT
Start: 2019-06-03 | End: 2019-12-19

## 2019-06-03 ASSESSMENT — ANXIETY QUESTIONNAIRES
GAD7 TOTAL SCORE: 14
4. TROUBLE RELAXING: NOT AT ALL
7. FEELING AFRAID AS IF SOMETHING AWFUL MIGHT HAPPEN: NEARLY EVERY DAY
6. BECOMING EASILY ANNOYED OR IRRITABLE: NEARLY EVERY DAY
5. BEING SO RESTLESS THAT IT IS HARD TO SIT STILL: NOT AT ALL
2. NOT BEING ABLE TO STOP OR CONTROL WORRYING: MORE THAN HALF THE DAYS
3. WORRYING TOO MUCH ABOUT DIFFERENT THINGS: NEARLY EVERY DAY
GAD7 TOTAL SCORE: 14
GAD7 TOTAL SCORE: 14
7. FEELING AFRAID AS IF SOMETHING AWFUL MIGHT HAPPEN: NEARLY EVERY DAY
1. FEELING NERVOUS, ANXIOUS, OR ON EDGE: NEARLY EVERY DAY

## 2019-06-03 ASSESSMENT — PATIENT HEALTH QUESTIONNAIRE - PHQ9
SUM OF ALL RESPONSES TO PHQ QUESTIONS 1-9: 13
SUM OF ALL RESPONSES TO PHQ QUESTIONS 1-9: 13

## 2019-06-03 ASSESSMENT — MIFFLIN-ST. JEOR: SCORE: 1423.8

## 2019-06-04 ASSESSMENT — ANXIETY QUESTIONNAIRES: GAD7 TOTAL SCORE: 14

## 2019-06-04 ASSESSMENT — PATIENT HEALTH QUESTIONNAIRE - PHQ9: SUM OF ALL RESPONSES TO PHQ QUESTIONS 1-9: 13

## 2019-07-11 ENCOUNTER — TELEPHONE (OUTPATIENT)
Dept: FAMILY MEDICINE | Facility: OTHER | Age: 44
End: 2019-07-11

## 2019-07-11 NOTE — LETTER
[facility]  [address] [place] [id number] [phone]      July 24, 2019    Radha Beckwith                                                                                                                     [address]  [place] [place] [address]            Dear Radha,    We have made several attempts to contact you and have been unsuccessful.    The prior authorization for omeprazole has been denied. Please call us at [phone] if you have any further questions.      Sincerely,         Your [facility] Team

## 2019-07-11 NOTE — TELEPHONE ENCOUNTER
Prior Authorization Retail Medication Request    Medication/Dose: Omeprazole  ICD code (if different than what is on RX):  Gastroesophageal reflux disease, esophagitis presence not specified (K21.9)  Previously Tried and Failed:  unknown  Rationale:  Pa now needed for 40mg once daily    Insurance Name:  [facility]  Insurance ID:  [id number]

## 2019-07-19 NOTE — TELEPHONE ENCOUNTER
Central Prior Authorization Team   Phone: [phone]      PA Initiation    Medication: Omeprazole-Initiated  Insurance Company: [employer] - Phone [phone] Fax [phone]  Pharmacy Filling the Rx: [facility] - [id number]  Filling Pharmacy Phone: [phone]  Filling Pharmacy Fax:    Start Date: 7/19/2019

## 2019-07-22 NOTE — TELEPHONE ENCOUNTER
PRIOR AUTHORIZATION DENIED    Medication: Omeprazole-DENIED    Denial Date: 7/20/2019    Denial Rational:  Requested quantity is larger/greater than allowed by insurance.  All PPIs are covered up to 1 per day for 120 days within 365 days.  If an appeal is desired please let the PA team know when a letter of medical necessity has been written explaining why the patient needs more than the 120 per 365.              Appeal Information:

## 2019-07-22 NOTE — TELEPHONE ENCOUNTER
Left message for patient to return call to clinic. When call is returned please see message below.     Regino Rascon,

## 2019-08-13 DIAGNOSIS — F51.04 PSYCHOPHYSIOLOGICAL INSOMNIA: ICD-10-CM

## 2019-08-13 DIAGNOSIS — F41.9 ANXIETY: ICD-10-CM

## 2019-08-15 RX ORDER — QUETIAPINE FUMARATE 200 MG/1
TABLET, FILM COATED ORAL
Qty: 90 TABLET | Refills: 1 | Status: SHIPPED | OUTPATIENT
Start: 2019-08-15 | End: 2020-03-05

## 2019-08-15 NOTE — TELEPHONE ENCOUNTER
Pending Prescriptions:                       Disp   Refills    QUEtiapine (SEROQUEL) 200 MG tablet [Phar*90 tab*1            Sig: TAKE ONE TABLET BY MOUTH EVERY NIGHT AT BEDTIME    Prescription approved per [facility] Refill Protocol.    Debra Ramon, RN, BSN

## 2019-09-06 DIAGNOSIS — K21.9 GASTROESOPHAGEAL REFLUX DISEASE, ESOPHAGITIS PRESENCE NOT SPECIFIED: ICD-10-CM

## 2019-09-10 RX ORDER — METOCLOPRAMIDE 10 MG/1
TABLET ORAL
Qty: 90 TABLET | Refills: 0 | Status: SHIPPED | OUTPATIENT
Start: 2019-09-10 | End: 2021-10-05

## 2019-10-18 DIAGNOSIS — M54.2 CHRONIC NECK PAIN: ICD-10-CM

## 2019-10-18 DIAGNOSIS — G89.29 CHRONIC NECK PAIN: ICD-10-CM

## 2019-10-21 RX ORDER — CYCLOBENZAPRINE HCL 5 MG
TABLET ORAL
Qty: 60 TABLET | Refills: 3 | Status: SHIPPED | OUTPATIENT
Start: 2019-10-21 | End: 2020-01-24

## 2019-10-21 NOTE — TELEPHONE ENCOUNTER
Pending Prescriptions:                       Disp   Refills    cyclobenzaprine (FLEXERIL) 5 MG tablet [P*60 tab*3            Sig: TAKE 1-2 TABLETS BY MOUTH NIGHTLY AS NEEDED FOR           MUSCLE SPASMS          Last Written Prescription Date:  6/3/19  Last Fill Quantity: 60,   # refills: 3  Last Office Visit: 6/3/19  Future Office visit:       Routing refill request to provider for review/approval because:  Drug not on the [facility] or [facility] refill protocol or controlled substance    Sarah Barrientos, RN, BSN

## 2019-11-21 DIAGNOSIS — F51.04 PSYCHOPHYSIOLOGICAL INSOMNIA: ICD-10-CM

## 2019-11-21 RX ORDER — ZOLPIDEM TARTRATE 5 MG/1
TABLET ORAL
Qty: 30 TABLET | Refills: 0 | Status: SHIPPED | OUTPATIENT
Start: 2019-11-21 | End: 2019-12-19

## 2019-11-22 NOTE — TELEPHONE ENCOUNTER
Pending Prescriptions:                       Disp   Refills    zolpidem (AMBIEN) 5 MG tablet [Pharmacy M*30 tab*5            Sig: TAKE ONE TABLET BY MOUTH NIGHTLY AS NEEDED FOR           SLEEP    Last Written Prescription Date:  06/03/2019  Last Fill Quantity: 30,  # refills: 5   Last office visit: 6/3/2019 with prescribing provider:     Future Office Visit:      Routing refill request to provider for review/approval because:  Drug not on the [facility] refill protocol

## 2019-11-24 ENCOUNTER — HOSPITAL ENCOUNTER (EMERGENCY)
Facility: CLINIC | Age: 44
Discharge: HOME | End: 2019-11-25
Attending: FAMILY MEDICINE | Admitting: FAMILY MEDICINE
Payer: COMMERCIAL

## 2019-11-24 DIAGNOSIS — Z86.59 HISTORY OF RECENT STRESSFUL LIFE EVENT: ICD-10-CM

## 2019-11-24 DIAGNOSIS — R19.7 DIARRHEA, UNSPECIFIED TYPE: ICD-10-CM

## 2019-11-24 DIAGNOSIS — F41.9 ANXIETY: ICD-10-CM

## 2019-11-24 DIAGNOSIS — E86.0 DEHYDRATION: ICD-10-CM

## 2019-11-24 DIAGNOSIS — R11.0 NAUSEA: ICD-10-CM

## 2019-11-24 LAB
BASOPHILS # BLD AUTO: 0 10E9/L (ref 0–0.2)
BASOPHILS NFR BLD AUTO: 0.4 %
DIFFERENTIAL METHOD BLD: NORMAL
EOSINOPHIL NFR BLD AUTO: 2.7 %
ERYTHROCYTE [DISTWIDTH] IN BLOOD BY AUTOMATED COUNT: 12.8 % (ref 10–15)
HCT VFR BLD AUTO: 41.7 % (ref 35–47)
HGB BLD-MCNC: 14.3 G/DL (ref 11.7–15.7)
IMM GRANULOCYTES # BLD: 0 10E9/L (ref 0–0.4)
IMM GRANULOCYTES NFR BLD: 0.3 %
LYMPHOCYTES # BLD AUTO: 3.5 10E9/L (ref 0.8–5.3)
LYMPHOCYTES NFR BLD AUTO: 35.6 %
MCH RBC QN AUTO: 31.6 PG (ref 26.5–33)
MCHC RBC AUTO-ENTMCNC: 34.3 G/DL (ref 31.5–36.5)
MCV RBC AUTO: 92 FL (ref 78–100)
MONOCYTES # BLD AUTO: 0.5 10E9/L (ref 0–1.3)
MONOCYTES NFR BLD AUTO: 5 %
NEUTROPHILS # BLD AUTO: 5.4 10E9/L (ref 1.6–8.3)
NEUTROPHILS NFR BLD AUTO: 56 %
NRBC # BLD AUTO: 0 10*3/UL
NRBC BLD AUTO-RTO: 0 /100
PLATELET # BLD AUTO: 275 10E9/L (ref 150–450)
RBC # BLD AUTO: 4.52 10E12/L (ref 3.8–5.2)
TROPONIN I SERPL-MCNC: <0.015 UG/L (ref 0–0.04)
WBC # BLD AUTO: 9.7 10E9/L (ref 4–11)

## 2019-11-24 PROCEDURE — 25800030 ZZH RX IP 258 OP 636: Performed by: FAMILY MEDICINE

## 2019-11-24 PROCEDURE — 96375 TX/PRO/DX INJ NEW DRUG ADDON: CPT | Performed by: FAMILY MEDICINE

## 2019-11-24 PROCEDURE — 80053 COMPREHEN METABOLIC PANEL: CPT | Performed by: FAMILY MEDICINE

## 2019-11-24 PROCEDURE — 85025 COMPLETE CBC W/AUTO DIFF WBC: CPT | Performed by: FAMILY MEDICINE

## 2019-11-24 PROCEDURE — 96361 HYDRATE IV INFUSION ADD-ON: CPT | Performed by: FAMILY MEDICINE

## 2019-11-24 PROCEDURE — 93010 ELECTROCARDIOGRAM REPORT: CPT | Mod: Z6 | Performed by: FAMILY MEDICINE

## 2019-11-24 PROCEDURE — 25000128 H RX IP 250 OP 636: Performed by: FAMILY MEDICINE

## 2019-11-24 PROCEDURE — 99285 EMERGENCY DEPT VISIT HI MDM: CPT | Mod: 25 | Performed by: FAMILY MEDICINE

## 2019-11-24 PROCEDURE — 93005 ELECTROCARDIOGRAM TRACING: CPT | Performed by: FAMILY MEDICINE

## 2019-11-24 PROCEDURE — 96374 THER/PROPH/DIAG INJ IV PUSH: CPT | Performed by: FAMILY MEDICINE

## 2019-11-24 PROCEDURE — 84484 ASSAY OF TROPONIN QUANT: CPT | Performed by: FAMILY MEDICINE

## 2019-11-24 PROCEDURE — 83036 HEMOGLOBIN GLYCOSYLATED A1C: CPT | Performed by: FAMILY MEDICINE

## 2019-11-24 RX ORDER — ONDANSETRON 2 MG/ML
4 INJECTION INTRAMUSCULAR; INTRAVENOUS EVERY 30 MIN PRN
Status: DISCONTINUED | OUTPATIENT
Start: 2019-11-24 | End: 2019-11-25 | Stop reason: HOSPADM

## 2019-11-24 RX ORDER — SODIUM CHLORIDE 9 MG/ML
1000 INJECTION, SOLUTION INTRAVENOUS CONTINUOUS
Status: DISCONTINUED | OUTPATIENT
Start: 2019-11-25 | End: 2019-11-25

## 2019-11-24 RX ORDER — LORAZEPAM 2 MG/ML
1 INJECTION INTRAMUSCULAR ONCE
Status: COMPLETED | OUTPATIENT
Start: 2019-11-24 | End: 2019-11-24

## 2019-11-24 RX ADMIN — ONDANSETRON 4 MG: 2 INJECTION INTRAMUSCULAR; INTRAVENOUS at 23:40

## 2019-11-24 RX ADMIN — SODIUM CHLORIDE 1000 ML: 9 INJECTION, SOLUTION INTRAVENOUS at 23:39

## 2019-11-24 RX ADMIN — LORAZEPAM 1 MG: 2 INJECTION, SOLUTION INTRAMUSCULAR; INTRAVENOUS at 23:40

## 2019-11-24 NOTE — ED AVS SNAPSHOT
[facility] Emergency Department  [address] [place] [address]  Phone:  [phone]  Fax:  [phone]                                    Radha Beckwith   MRN: [id number]    Department:  [facility] Emergency Department   Date of Visit:  11/24/2019           After Visit Summary Signature Page    I have received my discharge instructions, and my questions have been answered. I have discussed any challenges I see with this plan with the nurse or doctor.    ..........................................................................................................................................  Patient/Patient Representative Signature      ..........................................................................................................................................  Patient Representative Print Name and Relationship to Patient    ..................................................               ................................................  Date                                   Time    ..........................................................................................................................................  Reviewed by Signature/Title    ...................................................              ..............................................  Date                                               Time          22EPIC Rev 08/18

## 2019-11-25 ENCOUNTER — TELEPHONE (OUTPATIENT)
Dept: FAMILY MEDICINE | Facility: OTHER | Age: 44
End: 2019-11-25

## 2019-11-25 VITALS
TEMPERATURE: 97 F | DIASTOLIC BLOOD PRESSURE: 80 MMHG | OXYGEN SATURATION: 95 % | RESPIRATION RATE: 16 BRPM | HEART RATE: 98 BPM | SYSTOLIC BLOOD PRESSURE: 129 MMHG

## 2019-11-25 DIAGNOSIS — H91.90 HEARING DISORDER, UNSPECIFIED LATERALITY: Primary | ICD-10-CM

## 2019-11-25 LAB
ALBUMIN SERPL-MCNC: 4 G/DL (ref 3.4–5)
ALBUMIN UR-MCNC: NEGATIVE MG/DL
ALP SERPL-CCNC: 109 U/L (ref 40–150)
ALT SERPL W P-5'-P-CCNC: 30 U/L (ref 0–50)
AMPHETAMINES UR QL: NOT DETECTED NG/ML
ANION GAP SERPL CALCULATED.3IONS-SCNC: 10 MMOL/L (ref 3–14)
APPEARANCE UR: CLEAR
AST SERPL W P-5'-P-CCNC: 23 U/L (ref 0–45)
BARBITURATES UR QL SCN: NOT DETECTED NG/ML
BENZODIAZ UR QL SCN: NOT DETECTED NG/ML
BILIRUB SERPL-MCNC: 0.3 MG/DL (ref 0.2–1.3)
BILIRUB UR QL STRIP: NEGATIVE
BUN SERPL-MCNC: 12 MG/DL (ref 7–30)
BUPRENORPHINE UR QL: NOT DETECTED NG/ML
CALCIUM SERPL-MCNC: 8.4 MG/DL (ref 8.5–10.1)
CANNABINOIDS UR QL: NOT DETECTED NG/ML
CHLORIDE SERPL-SCNC: 99 MMOL/L (ref 94–109)
CO2 SERPL-SCNC: 28 MMOL/L (ref 20–32)
COCAINE UR QL SCN: NOT DETECTED NG/ML
COLOR UR AUTO: ABNORMAL
CREAT SERPL-MCNC: 0.76 MG/DL (ref 0.52–1.04)
D-METHAMPHET UR QL: NOT DETECTED NG/ML
GFR SERPL CREATININE-BSD FRML MDRD: >90 ML/MIN/{1.73_M2}
GLUCOSE SERPL-MCNC: 174 MG/DL (ref 70–99)
GLUCOSE UR STRIP-MCNC: NEGATIVE MG/DL
HBA1C MFR BLD: 5 % (ref 0–5.6)
HGB UR QL STRIP: NEGATIVE
KETONES UR STRIP-MCNC: NEGATIVE MG/DL
LEUKOCYTE ESTERASE UR QL STRIP: ABNORMAL
METHADONE UR QL SCN: NOT DETECTED NG/ML
MUCOUS THREADS #/AREA URNS LPF: PRESENT /LPF
NITRATE UR QL: NEGATIVE
OPIATES UR QL SCN: NOT DETECTED NG/ML
OXYCODONE UR QL SCN: NOT DETECTED NG/ML
PCP UR QL SCN: NOT DETECTED NG/ML
PH UR STRIP: 5 PH (ref 5–7)
POTASSIUM SERPL-SCNC: 3.5 MMOL/L (ref 3.4–5.3)
PROPOXYPH UR QL: NOT DETECTED NG/ML
PROT SERPL-MCNC: 8 G/DL (ref 6.8–8.8)
RBC #/AREA URNS AUTO: 1 /HPF (ref 0–2)
SODIUM SERPL-SCNC: 137 MMOL/L (ref 133–144)
SOURCE: ABNORMAL
SP GR UR STRIP: 1 (ref 1–1.03)
SQUAMOUS #/AREA URNS AUTO: 1 /HPF (ref 0–1)
TRICYCLICS UR QL SCN: ABNORMAL NG/ML
UROBILINOGEN UR STRIP-MCNC: 0 MG/DL (ref 0–2)
WBC #/AREA URNS AUTO: 1 /HPF (ref 0–5)

## 2019-11-25 PROCEDURE — 25800030 ZZH RX IP 258 OP 636: Performed by: FAMILY MEDICINE

## 2019-11-25 PROCEDURE — 96361 HYDRATE IV INFUSION ADD-ON: CPT | Performed by: FAMILY MEDICINE

## 2019-11-25 PROCEDURE — 81001 URINALYSIS AUTO W/SCOPE: CPT | Performed by: FAMILY MEDICINE

## 2019-11-25 PROCEDURE — 25000128 H RX IP 250 OP 636: Performed by: FAMILY MEDICINE

## 2019-11-25 PROCEDURE — 96376 TX/PRO/DX INJ SAME DRUG ADON: CPT | Performed by: FAMILY MEDICINE

## 2019-11-25 PROCEDURE — 80306 DRUG TEST PRSMV INSTRMNT: CPT | Performed by: FAMILY MEDICINE

## 2019-11-25 RX ORDER — ONDANSETRON 4 MG/1
4 TABLET, ORALLY DISINTEGRATING ORAL EVERY 6 HOURS PRN
Qty: 12 TABLET | Refills: 0 | Status: SHIPPED | OUTPATIENT
Start: 2019-11-25 | End: 2020-01-24

## 2019-11-25 RX ORDER — SODIUM CHLORIDE 9 MG/ML
1000 INJECTION, SOLUTION INTRAVENOUS CONTINUOUS
Status: DISCONTINUED | OUTPATIENT
Start: 2019-11-25 | End: 2019-11-25 | Stop reason: HOSPADM

## 2019-11-25 RX ORDER — LORAZEPAM 2 MG/ML
1 INJECTION INTRAMUSCULAR ONCE
Status: COMPLETED | OUTPATIENT
Start: 2019-11-25 | End: 2019-11-25

## 2019-11-25 RX ORDER — LORAZEPAM 1 MG/1
1 TABLET ORAL EVERY 6 HOURS PRN
Qty: 6 TABLET | Refills: 0 | Status: SHIPPED | OUTPATIENT
Start: 2019-11-25 | End: 2019-11-29

## 2019-11-25 RX ADMIN — LORAZEPAM 1 MG: 2 INJECTION INTRAMUSCULAR; INTRAVENOUS at 02:25

## 2019-11-25 RX ADMIN — SODIUM CHLORIDE 1000 ML: 9 INJECTION, SOLUTION INTRAVENOUS at 01:14

## 2019-11-25 NOTE — DISCHARGE INSTRUCTIONS
Encourage fluids.  Diet as tolerated.  Zofran ODT as needed for nausea.  Recheck in clinic in the next 1-2 days.    As we discussed, counseling could be quite helpful for you to deal with all of the stress in your life right now.  Marisa Story CNP can help you set that up.  Do not be scared to ask your friends for help.  As we discussed, you would be the first in line to help them out if they needed and would feel good doing so.  Do not deprive them of the opportunity to return the favor and feel good about helping you.  It was a pleasure visiting with you tonight.  I am glad you are feeling at least a little bit better and hope you continue to improve.  Happy Thanksgiving!    Thank you for choosing [facility]. We appreciate the opportunity to meet your urgent medical needs. Please let us know if we could have done anything to make your stay more satisfying.    After discharge, please closely monitor for any new or worsening symptoms. Return to the Emergency Department if you develop any acute worsening signs or symptoms.    If you received an opiate pain medication or sedative during your visit, please do not drive for at least 8 hours.     If you had lab work, cultures or imaging studies done during your stay, the final results may still be pending. We will call you if your plan of care needs to change. However, if you are not improving as expected, please follow up with your primary care provider or clinic.     Start any prescription medications that were prescribed to you and take them as directed.     Please see additional handouts that may be pertinent to your condition.

## 2019-11-25 NOTE — ED PROVIDER NOTES
"  History     Chief Complaint   Patient presents with     Dizziness     Panic Attack     HPI  Radha Beckwith is a 44 year old female who presents to the ED tonight (Sunday) with concerns about dehydration.  She states she cried for about 3 days last weekend.  Her mom passed away September 13.  She cared for her the last couple of months and woke one morning and found her passed away.  She had been ill and it was not unexpected but she still misses her terribly.  Her son is in [place] and this is the first time she is really ever been alone.     Again, she cried for 3 days and then on Tuesday of last week started with the sniffles and sinus symptoms.  The last 3 days she has had diarrhea but that has been tapering off.  First day was 3 or 4 times, yesterday twice, and today just once.  No blood in the stool.  She has had nausea but no vomiting and her anxiety has been ramping up.  She does have Ativan available that she can use as needed but ran out.    She states that her tongue feels bigger than usual.  She has a history of Bell's palsy and felt like she was getting some numbness in the left side of her face.  Her left leg was cramping in the calf region earlier and she has felt confused at times and found it difficult to find words such as \"TV\".  Denies any focal weakness.  She has had no cough, but gets a tickle in her throat if she talks too much.    Her oral intake has been down.  Has only urinated twice today, once this morning and then this evening just in a small amount.  Actually did have some dinner tonight.  She feels lightheaded if she gets up too quickly.  No vertigo.  Status post partial hysterectomy.  Ovaries remain.  She states that she has had a cyst on her right ovary but that sounds like it was several years ago.  Does not tolerate Compazine.  Has used Zofran in the past with good effect.    Allergies:  Allergies   Allergen Reactions     Cafergot      12-            GI problems- "     Seasonal Allergies      Sumatriptan      vomits after giving herself a shot     Compazine Anxiety     Droperidol Anxiety     Nubain [Nalbuphine Hcl] Anxiety     Prochlorperazine Palpitations     Uncontrolled movement       Problem List:    Patient Active Problem List    Diagnosis Date Noted     Moderate major depression (H) 06/03/2019     Priority: Medium     Supraventricular tachycardia (H) 06/03/2019     Priority: Medium     S/P hysterectomy 12/30/2017     Priority: Medium     Psychophysiological insomnia 12/30/2017     Priority: Medium     Chronic bilateral low back pain without sciatica 12/30/2017     Priority: Medium     Papanicolaou smear of cervix with low grade squamous intraepithelial lesion (LGSIL) 07/07/2017     Priority: Medium     7/7/17 LSIL pap/+ HR HPV (not 16 or 18). Plan: colp bef 10/7/17.   8/10/17 [place]:Focal squamous atypia; cannot exclude koilocytosis   Plan: hysterectomy.  10/30/17 Hysterectomy surgery  01/09/19 Patient is lost to pap tracking follow-up         Opioid dependence in remission (H) 08/02/2015     Priority: Medium     On suboxone treatement with Garland Rodriguez MD, [place].  (Noted in 2015).  Has been off suboxone since at least June of 2017. 8/9/2017        Anxiety attack 07/31/2015     Priority: Medium     Hypokalemia 06/23/2015     Priority: Medium     Atypical chest pain 06/23/2015     Priority: Medium     Tobacco abuse 06/23/2015     Priority: Medium     Hyperlipidemia LDL goal <100 01/29/2014     Priority: Medium     Ingrowing nail 01/09/2014     Priority: Medium     Anxiety 08/19/2013     Priority: Medium     GERD (gastroesophageal reflux disease) 07/28/2010     Priority: Medium     Takes omeprazole and metoclopramide       Restless legs 07/28/2010     Priority: Medium        Past Medical History:    Past Medical History:   Diagnosis Date     Abdominal pain, right lower quadrant 03/09/08     Anxiety attack 7/31/2015     Dehydration      Gastric ulcer 7/31/2015     GERD  (gastroesophageal reflux disease) 7/28/2010     Opioid dependence in remission (H) 8/2/2015     Other and unspecified ovarian cyst      Papanicolaou smear of cervix with low grade squamous intraepithelial lesion (LGSIL) 07/07/2017       Past Surgical History:    Past Surgical History:   Procedure Laterality Date     BIOPSY CERVICAL, LOCAL EXCISION, SINGLE/MULTIPLE N/A 8/10/2017    Procedure: BIOPSY CERVICAL, LOCAL EXCISION, SINGLE/MULTIPLE;;  Surgeon: Michael Chandler MD;  Location: [facility]     COLPOSCOPY, BIOPSY, COMBINED N/A 8/10/2017    Procedure: COMBINED COLPOSCOPY, BIOPSY;  Colposcopy with Cervical Biopsies and Endometrial Biopsy, Exam with Ultrasound;  Surgeon: Michael Chandler MD;  Location: [facility]     ESOPHAGOSCOPY, GASTROSCOPY, DUODENOSCOPY (EGD), COMBINED N/A 4/17/2017    Procedure: COMBINED ESOPHAGOSCOPY, GASTROSCOPY, DUODENOSCOPY (EGD);  Surgeon: Ibrahima Esposito MD;  Location: [facility]     EXAM UNDER ANESTHESIA PELVIC N/A 8/10/2017    Procedure: EXAM UNDER ANESTHESIA PELVIC;;  Surgeon: Michael Chandler MD;  Location: [facility]     HC UGI ENDOSCOPY, SIMPLE EXAM  01/07/08     HYSTERECTOMY       LAPAROSCOPIC CHOLECYSTECTOMY N/A 2/2/2018    Procedure: LAPAROSCOPIC CHOLECYSTECTOMY;  Laparoscopic Cholecystectomy;  Surgeon: Tigre Lowry DO;  Location: [facility]     LAPAROSCOPIC HYSTERECTOMY TOTAL N/A 10/30/2017    Procedure: LAPAROSCOPIC HYSTERECTOMY TOTAL;  LAPAROSCOPIC HYSTERECTOMY TOTAL POSSIBLE SALPINGO-OOPHERECTOMY (BILATERAL);  Surgeon: Michael Chandler MD;  Location: [facility]       Family History:    Family History   Problem Relation Age of Onset     Depression Mother      Respiratory Mother      Chronic Obstructive Pulmonary Disease Mother      Cerebrovascular Disease Father         Brain anyeurism     Adrenal Disorder Other      Chronic Obstructive Pulmonary Disease Other      Breast Cancer Cousin        Social History:  Marital Status:  Single [1]  Social History      Tobacco Use     Smoking status: Current Every Day Smoker     Packs/day: 0.25     Years: 20.00     Pack years: 5.00     Types: Cigarettes     Smokeless tobacco: Never Used     Tobacco comment: 5-6 cigs daily   Substance Use Topics     Alcohol use: Yes     Comment: occasional drinks     Drug use: No        Medications:    LORazepam (ATIVAN) 1 MG tablet  ondansetron (ZOFRAN ODT) 4 MG ODT tab  ACETAMINOPHEN PO  buprenorphine-naloxone (SUBOXONE) 2-0.5 MG SUBL sublingual tablet  cetirizine (ZYRTEC) 10 MG tablet  cyclobenzaprine (FLEXERIL) 5 MG tablet  LORazepam (ATIVAN) 1 MG tablet  metoclopramide (REGLAN) 10 MG tablet  omeprazole (PRILOSEC) 40 MG DR capsule  ondansetron (ZOFRAN) 4 MG tablet  order for DME  predniSONE (DELTASONE) 20 MG tablet  propranolol (INDERAL) 20 MG tablet  QUEtiapine (SEROQUEL) 200 MG tablet  rOPINIRole (REQUIP) 0.5 MG tablet  sertraline (ZOLOFT) 100 MG tablet  simvastatin (ZOCOR) 10 MG tablet  SUBOXONE 2-0.5 MG per film  zolpidem (AMBIEN) 5 MG tablet          Review of Systems   All other systems reviewed and are negative.      Physical Exam   BP: (!) 170/106  Pulse: 120  Temp: 97  F (36.1  C)  Resp: 20  SpO2: 99 %      Physical Exam  Constitutional:       General: She is not in acute distress.  HENT:      Head: Normocephalic.      Right Ear: External ear normal.      Left Ear: External ear normal.      Ears:      Comments: Bilateral hearing aids       Mouth/Throat:      Mouth: Mucous membranes are moist.      Pharynx: Oropharynx is clear.   Eyes:      Extraocular Movements: Extraocular movements intact.      Right eye: No nystagmus.      Left eye: No nystagmus.      Pupils: Pupils are equal, round, and reactive to light.   Neck:      Musculoskeletal: Normal range of motion.   Cardiovascular:      Rate and Rhythm: Regular rhythm. Tachycardia present.   Pulmonary:      Effort: Pulmonary effort is normal.      Breath sounds: Normal breath sounds.   Abdominal:      General: Abdomen is flat. There  is no distension.      Palpations: Abdomen is soft.      Tenderness: There is no abdominal tenderness.   Musculoskeletal: Normal range of motion.         General: No tenderness (no calf/thigh tenderness or swelling).   Skin:     General: Skin is warm.   Neurological:      General: No focal deficit present.      Mental Status: She is alert and oriented to person, place, and time.      GCS: GCS eye subscore is 4. GCS verbal subscore is 5. GCS motor subscore is 6.      Cranial Nerves: Cranial nerves are intact. No dysarthria or facial asymmetry.      Sensory: Sensation is intact.      Motor: Motor function is intact.      Gait: Gait is intact.      Deep Tendon Reflexes:      Reflex Scores:       Patellar reflexes are 2+ on the right side and 2+ on the left side.       Achilles reflexes are 2+ on the right side and 2+ on the left side.  Psychiatric:         Mood and Affect: Mood normal.         ED Course  (with Medical Decision Making)    44-year-old female has been sick for about a week or so.  She has significant anxiety and significant loss in her life.  Mom passed away a couple of months ago and her son is in [place] so she now is alone for the first time in her life.  She cried for 3 days and then got sick starting with upper respiratory symptoms but then progressed to diarrhea the past 3 days and nausea.  No vomiting.  Oral intake has been down as has her urine output.  She feels lightheaded if she gets up too quickly.  No vertigo symptoms.  No symptoms at rest.  She had some chest and abdominal discomfort from her anxiety.  Takes lorazepam as needed but ran out.  Gets very tearful when she talks about her mom's recent passing.  Just feels very dehydrated and is hoping to get some IV fluids.  She describes some vague neurological symptoms feeling like her Bell's palsy was coming back and some cramping in her legs and some confusion but I am not finding any focal deficits on exam.    On exam she is a bit  tachycardic but her heart rate is regular.  We will grab an EKG and make sure she does not have an underlying arrhythmia as I do see a history of SVT on her chart.  We will check some screening labs and start a liter of normal saline.  Zofran for nausea.  Ativan for anxiety.    CBC and comprehensive profile was unremarkable except her glucose is up a bit at 174.  Troponin was undetectable.  EKG was normal.      She is feeling better after the second liter of fluids and the Zofran and Ativan.  Still a little bit of chest discomfort from the anxiety.  I do not think this is cardiac in etiology with a normal EKG and undetectable troponin after several days of symptoms.  Ice long talk about all the stress that she is dealing with.  Her only son is in [place] for a year and her mom just passed away.  The holidays are coming up and it has been difficult.  She has 1 brother who lives locally but is not involved in the family.  He had not talked to his mom in 3 or 4 years before she passed and would even talk to patient at her .  She has a stepbrother that lives in [facility] [facility] and a stepsister that lives down in [place].  She is closer to them than her blood brother.  She also has some good friends that live close by and is involved in the [employer] so she does have a support system.  She may benefit from some counseling to help her deal with everything that is going on especially with the holidays coming up and she is open to that.  I asked registration to set her up to see her primary provider here in the next couple of days.  Marisa Story does have some openings so we were able to do that for her.    She feels like she is imposing on friends if she asked for help.  I pointed out to her that she is likely a good person and if her friends ever needed help, she would most likely be the first 1 in line to help them out and would feel good doing so.  I told her not to deprive her friends of the  opportunity to feel good about helping her back.  She appreciated that and had never thought of it that way.    She will trial some oral liquids and I am going to refill her Zofran ODT.  She states her Ativan prescription can be refilled on the 28th.  I gave her another dose here in the ED as the first dose did help somewhat.  Overall, she is feeling better and was very appreciative of our talk.    She was feeling better and able to rest after the second dose of Ativan.  She feels ready to go home.  She asked for a few Ativan to have on hand until her clinic appointment tomorrow.  I gave her #6 tab of Ativan 1 mg q 6hrs prn.  Further refills per primary provider.          Procedures               EKG Interpretation:      Interpreted by Reinaldo Marroquin MD  Time reviewed: 12:20 AM   Symptoms at time of EKG: anxiety, chest/abd discomfort,    Rhythm: normal sinus   Rate: 96  Axis: normal  Ectopy: none  Conduction: normal  ST Segments/ T Waves: No ST-T wave changes  Q Waves: none  Comparison to prior: No significant change compared to 3/24/2017 except now her IL interval is normal.    Clinical Impression: normal EKG          Critical Care time:  none               Results for orders placed or performed during the hospital encounter of 11/24/19 (from the past 24 hour(s))   CBC with platelets differential   Result Value Ref Range    WBC 9.7 4.0 - 11.0 10e9/L    RBC Count 4.52 3.8 - 5.2 10e12/L    Hemoglobin 14.3 11.7 - 15.7 g/dL    Hematocrit 41.7 35.0 - 47.0 %    MCV 92 78 - 100 fl    MCH 31.6 26.5 - 33.0 pg    MCHC 34.3 31.5 - 36.5 g/dL    RDW 12.8 10.0 - 15.0 %    Platelet Count 275 150 - 450 10e9/L    Diff Method Automated Method     % Neutrophils 56.0 %    % Lymphocytes 35.6 %    % Monocytes 5.0 %    % Eosinophils 2.7 %    % Basophils 0.4 %    % Immature Granulocytes 0.3 %    Nucleated RBCs 0 0 /100    Absolute Neutrophil 5.4 1.6 - 8.3 10e9/L    Absolute Lymphocytes 3.5 0.8 - 5.3 10e9/L    Absolute Monocytes  0.5 0.0 - 1.3 10e9/L    Absolute Basophils 0.0 0.0 - 0.2 10e9/L    Abs Immature Granulocytes 0.0 0 - 0.4 10e9/L    Absolute Nucleated RBC 0.0    Comprehensive metabolic panel   Result Value Ref Range    Sodium 137 133 - 144 mmol/L    Potassium 3.5 3.4 - 5.3 mmol/L    Chloride 99 94 - 109 mmol/L    Carbon Dioxide 28 20 - 32 mmol/L    Anion Gap 10 3 - 14 mmol/L    Glucose 174 (H) 70 - 99 mg/dL    Urea Nitrogen 12 7 - 30 mg/dL    Creatinine 0.76 0.52 - 1.04 mg/dL    GFR Estimate >90 >60 mL/min/[1.73_m2]    GFR Estimate If Black >90 >60 mL/min/[1.73_m2]    Calcium 8.4 (L) 8.5 - 10.1 mg/dL    Bilirubin Total 0.3 0.2 - 1.3 mg/dL    Albumin 4.0 3.4 - 5.0 g/dL    Protein Total 8.0 6.8 - 8.8 g/dL    Alkaline Phosphatase 109 40 - 150 U/L    ALT 30 0 - 50 U/L    AST 23 0 - 45 U/L   Troponin I   Result Value Ref Range    Troponin I ES <0.015 0.000 - 0.045 ug/L   Hemoglobin A1c   Result Value Ref Range    Hemoglobin A1C 5.0 0 - 5.6 %   UA with Microscopic   Result Value Ref Range    Color Urine Straw     Appearance Urine Clear     Glucose Urine Negative NEG^Negative mg/dL    Bilirubin Urine Negative NEG^Negative    Ketones Urine Negative NEG^Negative mg/dL    Specific Gravity Urine 1.005 1.003 - 1.035    Blood Urine Negative NEG^Negative    pH Urine 5.0 5.0 - 7.0 pH    Protein Albumin Urine Negative NEG^Negative mg/dL    Urobilinogen mg/dL 0.0 0.0 - 2.0 mg/dL    Nitrite Urine Negative NEG^Negative    Leukocyte Esterase Urine Trace (A) NEG^Negative    Source Midstream Urine     WBC Urine 1 0 - 5 /HPF    RBC Urine 1 0 - 2 /HPF    Squamous Epithelial /HPF Urine 1 0 - 1 /HPF    Mucous Urine Present (A) NEG^Negative /LPF   Urine Drugs of Abuse Screen Panel 13   Result Value Ref Range    Cannabinoids (51-pmy-4-carboxy-9-THC) Not Detected NDET^Not Detected ng/mL    Phencyclidine (Phencyclidine) Not Detected NDET^Not Detected ng/mL    Cocaine (Benzoylecgonine) Not Detected NDET^Not Detected ng/mL    Methamphetamine (d-Methamphetamine)  Not Detected NDET^Not Detected ng/mL    Opiates (Morphine) Not Detected NDET^Not Detected ng/mL    Amphetamine (d-Amphetamine) Not Detected NDET^Not Detected ng/mL    Benzodiazepines (Nordiazepam) Not Detected NDET^Not Detected ng/mL    Tricyclic Antidepressants (Desipramine) Detected, Abnormal Result (A) NDET^Not Detected ng/mL    Methadone (Methadone) Not Detected NDET^Not Detected ng/mL    Barbiturates (Butalbital) Not Detected NDET^Not Detected ng/mL    Oxycodone (Oxycodone) Not Detected NDET^Not Detected ng/mL    Propoxyphene (Norpropoxyphene) Not Detected NDET^Not Detected ng/mL    Buprenorphine (Buprenorphine) Not Detected NDET^Not Detected ng/mL       Medications   ondansetron (ZOFRAN) injection 4 mg (4 mg Intravenous Given 11/24/19 2340)   0.9% sodium chloride BOLUS (0 mLs Intravenous Stopped 11/25/19 0037)     Followed by   sodium chloride 0.9% infusion (0 mLs Intravenous Stopped 11/25/19 0236)   LORazepam (ATIVAN) injection 1 mg (1 mg Intravenous Given 11/24/19 2340)   LORazepam (ATIVAN) injection 1 mg (1 mg Intravenous Given 11/25/19 0225)       Assessments & Plan     I have reviewed the nursing notes.    I have reviewed the findings, diagnosis, plan and need for follow up with the patient.       New Prescriptions    LORAZEPAM (ATIVAN) 1 MG TABLET    Take 1 tablet (1 mg) by mouth every 6 hours as needed for anxiety    ONDANSETRON (ZOFRAN ODT) 4 MG ODT TAB    Take 1 tablet (4 mg) by mouth every 6 hours as needed for nausea       Final diagnoses:   Dehydration   Nausea   Diarrhea, unspecified type   Anxiety   History of recent stressful life event       11/24/2019   [facility] EMERGENCY DEPARTMENT     Reinaldo Marroquin MD  11/25/19 3860       Reinaldo Marroquin MD  11/25/19 [id number]

## 2019-11-25 NOTE — TELEPHONE ENCOUNTER
Reason for Call:  Other appointment    Detailed comments: Patient needs to have an order placed for Audiology. Has appointment on 12/10     Phone Number Patient can be reached at: Home number on file [phone] (home)    Best Time: any    Can we leave a detailed message on this number? YES    Call taken on 11/25/2019 at 1:54 PM by Edith Quinones

## 2019-11-26 NOTE — TELEPHONE ENCOUNTER
Left a detailed message on patient's voicemail informing patient that provider put an order in for audiology.

## 2019-11-29 DIAGNOSIS — F41.9 ANXIETY: Primary | ICD-10-CM

## 2019-11-29 RX ORDER — LORAZEPAM 1 MG/1
TABLET ORAL
Qty: 30 TABLET | Refills: 0 | Status: SHIPPED | OUTPATIENT
Start: 2019-11-29 | End: 2019-12-18

## 2019-11-29 NOTE — TELEPHONE ENCOUNTER
Spoke to patient and advised her of message below. Patient verbalized understanding. Declined to schedule appointment.

## 2019-11-29 NOTE — TELEPHONE ENCOUNTER
Please call patient. She is due for every 6 month follow up on anxiety for refills of lorazepam. I sent in a one month refill.  YARELIS PazC

## 2019-11-29 NOTE — TELEPHONE ENCOUNTER
Reason for Call:  Medication or medication refill:    Do you use a [facility]?  Name of the pharmacy and phone number for the current request:  [facility] [phone]    Name of the medication requested: LORazepam (ATIVAN) 1 MG tablet       Other request: patient requesting refill, please call when done    Can we leave a detailed message on this number? YES    Phone number patient can be reached at: Cell number on file:    Telephone Information:   Mobile [phone]       Best Time: any    Call taken on 11/29/2019 at 9:45 AM by Kristal Casanova

## 2019-12-18 ENCOUNTER — HOSPITAL ENCOUNTER (EMERGENCY)
Facility: CLINIC | Age: 44
Discharge: HOME | End: 2019-12-18
Attending: EMERGENCY MEDICINE | Admitting: EMERGENCY MEDICINE
Payer: COMMERCIAL

## 2019-12-18 VITALS
HEART RATE: 85 BPM | OXYGEN SATURATION: 94 % | WEIGHT: 170 LBS | SYSTOLIC BLOOD PRESSURE: 92 MMHG | TEMPERATURE: 98.3 F | DIASTOLIC BLOOD PRESSURE: 74 MMHG | RESPIRATION RATE: 21 BRPM | BODY MASS INDEX: 28.29 KG/M2

## 2019-12-18 DIAGNOSIS — F41.9 ANXIETY: ICD-10-CM

## 2019-12-18 PROCEDURE — 99284 EMERGENCY DEPT VISIT MOD MDM: CPT | Mod: Z6 | Performed by: EMERGENCY MEDICINE

## 2019-12-18 PROCEDURE — 99283 EMERGENCY DEPT VISIT LOW MDM: CPT | Performed by: EMERGENCY MEDICINE

## 2019-12-18 PROCEDURE — 25000132 ZZH RX MED GY IP 250 OP 250 PS 637: Performed by: EMERGENCY MEDICINE

## 2019-12-18 RX ORDER — LORAZEPAM 1 MG/1
1 TABLET ORAL ONCE
Status: COMPLETED | OUTPATIENT
Start: 2019-12-18 | End: 2019-12-18

## 2019-12-18 RX ORDER — LORAZEPAM 1 MG/1
1 TABLET ORAL EVERY 6 HOURS PRN
Qty: 5 TABLET | Refills: 0 | Status: SHIPPED | OUTPATIENT
Start: 2019-12-18 | End: 2019-12-23

## 2019-12-18 RX ADMIN — LORAZEPAM 1 MG: 1 TABLET ORAL at 07:29

## 2019-12-18 NOTE — ED AVS SNAPSHOT
[facility] Emergency Department  [address] [place] [address]  Phone:  [phone]  Fax:  [phone]                                    Radha Beckwith   MRN: [id number]    Department:  [facility] Emergency Department   Date of Visit:  12/18/2019           After Visit Summary Signature Page    I have received my discharge instructions, and my questions have been answered. I have discussed any challenges I see with this plan with the nurse or doctor.    ..........................................................................................................................................  Patient/Patient Representative Signature      ..........................................................................................................................................  Patient Representative Print Name and Relationship to Patient    ..................................................               ................................................  Date                                   Time    ..........................................................................................................................................  Reviewed by Signature/Title    ...................................................              ..............................................  Date                                               Time          22EPIC Rev 08/18

## 2019-12-18 NOTE — ED NOTES
Patient reports feeling better after speaking with Dr. Neal. She is ready for discharge. Marielos Acevedo RN

## 2019-12-18 NOTE — ED PROVIDER NOTES
History     Chief Complaint   Patient presents with     Generalized Weakness     HPI  Radha Beckwith is a 44 year old female who presents to the er with concerns for seratonin syndrome. She feels confused, lightheaded, dizziness, jerking body when falling asleep.  She is short of breath, extremely anxious. These symptoms have been present for 3-4 days.   Her mom passed away and her son went to [place] at the same time ().  She tried to take meds for the symptoms she was having including trazodone, decongestant.  [place] [facility] and was put on antibiotics for a skin infection. She has not been sweating, febrile, having tremors etc.  She has had a lot of problems with anxiety. Does not have a therapist now but has had one previously.     Allergies:  Allergies   Allergen Reactions     Cafergot      12-            GI problems-     Seasonal Allergies      Sumatriptan      vomits after giving herself a shot     Compazine Anxiety     Droperidol Anxiety     Nubain [Nalbuphine Hcl] Anxiety     Prochlorperazine Palpitations     Uncontrolled movement       Problem List:    Patient Active Problem List    Diagnosis Date Noted     Moderate major depression (H) 06/03/2019     Priority: Medium     Supraventricular tachycardia (H) 06/03/2019     Priority: Medium     S/P hysterectomy 12/30/2017     Priority: Medium     Psychophysiological insomnia 12/30/2017     Priority: Medium     Chronic bilateral low back pain without sciatica 12/30/2017     Priority: Medium     Papanicolaou smear of cervix with low grade squamous intraepithelial lesion (LGSIL) 07/07/2017     Priority: Medium     7/7/17 LSIL pap/+ HR HPV (not 16 or 18). Plan: colp bef 10/7/17.   8/10/17 [place]:Focal squamous atypia; cannot exclude koilocytosis   Plan: hysterectomy.  10/30/17 Hysterectomy surgery  01/09/19 Patient is lost to pap tracking follow-up         Opioid dependence in remission (H) 08/02/2015     Priority: Medium     On  suboxone treatement with Garland Rodriguez MD, [place].  (Noted in 2015).  Has been off suboxone since at least June of 2017. 8/9/2017        Anxiety attack 07/31/2015     Priority: Medium     Hypokalemia 06/23/2015     Priority: Medium     Atypical chest pain 06/23/2015     Priority: Medium     Tobacco abuse 06/23/2015     Priority: Medium     Hyperlipidemia LDL goal <100 01/29/2014     Priority: Medium     Ingrowing nail 01/09/2014     Priority: Medium     Anxiety 08/19/2013     Priority: Medium     GERD (gastroesophageal reflux disease) 07/28/2010     Priority: Medium     Takes omeprazole and metoclopramide       Restless legs 07/28/2010     Priority: Medium        Past Medical History:    Past Medical History:   Diagnosis Date     Abdominal pain, right lower quadrant 03/09/08     Anxiety attack 7/31/2015     Dehydration      Gastric ulcer 7/31/2015     GERD (gastroesophageal reflux disease) 7/28/2010     Opioid dependence in remission (H) 8/2/2015     Other and unspecified ovarian cyst      Papanicolaou smear of cervix with low grade squamous intraepithelial lesion (LGSIL) 07/07/2017       Past Surgical History:    Past Surgical History:   Procedure Laterality Date     BIOPSY CERVICAL, LOCAL EXCISION, SINGLE/MULTIPLE N/A 8/10/2017    Procedure: BIOPSY CERVICAL, LOCAL EXCISION, SINGLE/MULTIPLE;;  Surgeon: Michael Chandler MD;  [facility]     COLPOSCOPY, BIOPSY, COMBINED N/A 8/10/2017    Procedure: COMBINED COLPOSCOPY, BIOPSY;  Colposcopy with Cervical Biopsies and Endometrial Biopsy, Exam with Ultrasound;  Surgeon: Michael Chandler MD;  [facility]     ESOPHAGOSCOPY, GASTROSCOPY, DUODENOSCOPY (EGD), COMBINED N/A 4/17/2017    Procedure: COMBINED ESOPHAGOSCOPY, GASTROSCOPY, DUODENOSCOPY (EGD);  Surgeon: Ibrahima Esposito MD;  [facility]     EXAM UNDER ANESTHESIA PELVIC N/A 8/10/2017    Procedure: EXAM UNDER ANESTHESIA PELVIC;;  Surgeon: Michael Chandler MD;  [facility]      [facility] UGI ENDOSCOPY, SIMPLE EXAM  01/07/08     HYSTERECTOMY       LAPAROSCOPIC CHOLECYSTECTOMY N/A 2/2/2018    Procedure: LAPAROSCOPIC CHOLECYSTECTOMY;  Laparoscopic Cholecystectomy;  Surgeon: Tigre Lowry DO;  [facility]     LAPAROSCOPIC HYSTERECTOMY TOTAL N/A 10/30/2017    Procedure: LAPAROSCOPIC HYSTERECTOMY TOTAL;  LAPAROSCOPIC HYSTERECTOMY TOTAL POSSIBLE SALPINGO-OOPHERECTOMY (BILATERAL);  Surgeon: Michael Chandler MD;  [facility]       Family History:    Family History   Problem Relation Age of Onset     Depression Mother      Respiratory Mother      Chronic Obstructive Pulmonary Disease Mother      Cerebrovascular Disease Father         Brain anyeurism     Adrenal Disorder Other      Chronic Obstructive Pulmonary Disease Other      Breast Cancer Cousin        Social History:  Marital Status:  Single [1]  Social History     Tobacco Use     Smoking status: Current Every Day Smoker     Packs/day: 0.25     Years: 20.00     Pack years: 5.00     Types: Cigarettes     Smokeless tobacco: Never Used     Tobacco comment: 5-6 cigs daily   Substance Use Topics     Alcohol use: Yes     Comment: occasional drinks     Drug use: No        Medications:    cetirizine (ZYRTEC) 10 MG tablet  cyclobenzaprine (FLEXERIL) 5 MG tablet  LORazepam (ATIVAN) 1 MG tablet  omeprazole (PRILOSEC) 40 MG DR capsule  ondansetron (ZOFRAN) 4 MG tablet  propranolol (INDERAL) 20 MG tablet  QUEtiapine (SEROQUEL) 200 MG tablet  rOPINIRole (REQUIP) 0.5 MG tablet  sertraline (ZOLOFT) 100 MG tablet  simvastatin (ZOCOR) 10 MG tablet  SUBOXONE 2-0.5 MG per film  zolpidem (AMBIEN) 5 MG tablet  ACETAMINOPHEN PO  buprenorphine-naloxone (SUBOXONE) 2-0.5 MG SUBL sublingual tablet  metoclopramide (REGLAN) 10 MG tablet  order for DME          Review of Systems   All other systems reviewed and are negative.      Physical Exam   BP: (!) 134/103  Pulse: 80  Heart Rate: 89  Temp: 98.3  F (36.8  C)  Resp: 18  Weight: 77.1 kg (170  lb)  SpO2: 99 %      Physical Exam  Vitals signs and nursing note reviewed.   Constitutional:       General: She is not in acute distress.     Appearance: She is well-developed. She is not diaphoretic.   HENT:      Head: Normocephalic and atraumatic.      Right Ear: External ear normal.      Left Ear: External ear normal.      Nose: Nose normal.      Mouth/Throat:      Mouth: Mucous membranes are moist.   Eyes:      General: No scleral icterus.     Extraocular Movements: Extraocular movements intact.   Neck:      Musculoskeletal: Normal range of motion and neck supple.   Cardiovascular:      Rate and Rhythm: Normal rate and regular rhythm.      Pulses: Normal pulses.      Heart sounds: Normal heart sounds.   Pulmonary:      Effort: Pulmonary effort is normal.      Breath sounds: Normal breath sounds.   Musculoskeletal: Normal range of motion.         General: No swelling, tenderness, deformity or signs of injury.   Skin:     General: Skin is warm and dry.      Coloration: Skin is not pale.      Findings: No erythema or rash.   Neurological:      General: No focal deficit present.      Mental Status: She is alert and oriented to person, place, and time.      Comments: No clonus, no hyperreflexia   Psychiatric:      Comments: anxious         ED Course        Procedures                 No results found for this or any previous visit (from the past 24 hour(s)).    Medications   LORazepam (ATIVAN) tablet 1 mg (1 mg Oral Given 12/18/19 3126)       Assessments & Plan (with Medical Decision Making)  44-year-old concern for serotonin syndrome.  No evidence for that here on exam or history.  I think mostly she has anxiety.  She was given a tablet of Ativan here.  5 tablets for home.  Return to ER precautions and follow-up with primary care precautions discussed.     I have reviewed the nursing notes.    I have reviewed the findings, diagnosis, plan and need for follow up with the patient.      Discharge Medication List as of  12/18/2019  7:41 AM          Final diagnoses:   Anxiety       12/18/2019   [facility] EMERGENCY DEPARTMENT     Clemente Neal MD  12/18/19 2696

## 2019-12-18 NOTE — ED NOTES
Patient reports that in addition to the medications she took this am at 0200, she also took 200 mg of Trazadone.

## 2019-12-18 NOTE — ED TRIAGE NOTES
"Presents to ED with concerns for serotonin syndrome. Patient states that she was started on new medication recently but also has been taking her old medications and last night when she was trying to fall asleep she kept \"jerking\" and feels confused.  "

## 2019-12-19 ENCOUNTER — PATIENT OUTREACH (OUTPATIENT)
Dept: CARE COORDINATION | Facility: CLINIC | Age: 44
End: 2019-12-19

## 2019-12-19 ASSESSMENT — ACTIVITIES OF DAILY LIVING (ADL): DEPENDENT_IADLS:: INDEPENDENT

## 2019-12-19 NOTE — PROGRESS NOTES
Clinic Care Coordination Contact  Clinic Care Coordination Contact  OUTREACH  Referral Information:  Referral Source: ED Follow-Up  Primary Diagnosis: Psychosocial  Chief Complaint   Patient presents with     Clinic Care Coordination - Initial   clinic Utilization  Difficulty keeping appointments:: No  Compliance Concerns: No  No-Show Concerns: No  No PCP office visit in Past Year: No  Utilization    Last refreshed: 12/19/2019  3:49 AM:  Hospital Admissions 0           Last refreshed: 12/19/2019  3:49 AM:  ED Visits 2           Last refreshed: 12/19/2019  3:49 AM:  No Show Count (past year) 1              Current as of: 12/19/2019  3:49 AM          Clinical Concerns:  Current Medical Concerns:  [facility] Emergency Department 12/18/19 for anxiety. Introduced self and role, patient reporting she is feeling better. She is reassured from her exam yesterday. She slept on and off overnight. She recently went from days to second shift at work. Also her work schedule changes every week. This is a new stressor. Has seen psychiatrist in the past. Not interested in seeing them again. She has a gal she trusts at [email or web address]. Making an appointment is hard due to work schedule. Her mom passed away in September, and son went to [place]. Her mom was paying the mortgage with social security, but with her mom passing, financial burden increased.  She needs to sell her home due to finances. Denies resources for housing. She has a boyfriend and will move in with him. Patient has access to food. Patient would like to make PCP follow up appointment, RAZIA ponce transferred to to [facility] Scheduling number: [phone]. Patient denies a need for ongoing support, she feels confident to carry out treatment plan and feels good about moving in with her boyfriend.     Current Behavioral Concerns: none    Education Provided to patient: RN CC educated about Care Coordination Services, discharge instructions,  medications reviewed and follow up    Pain  Pain (GOAL):: No  Health Maintenance Reviewed: Not assessed  Clinical Pathway: None    Medication Management:  Patient given short term supply of ativan, needs to follow up with PCP for refills   Functional Status:  Dependent ADLs:: Independent  Dependent IADLs:: Independent  Bed or wheelchair confined:: No  Mobility Status: Independent  Fallen 2 or more times in the past year?: No  Any fall with injury in the past year?: No    Living Situation:  Current living arrangement:: I live alone  [facility]    Diet/Exercise/Sleep:  Diet:: Regular  Food Insecurity: No  Tube Feeding: No  Exercise:: Currently not exercising  Significant changes in sleep pattern (GOAL): No    Transportation:  Transportation concerns (GOAL):: No  Transportation means:: Regular car     Psychosocial:  Mental health DX:: Yes  Mental health DX how managed:: Medication  Mental health management concern (GOAL):: Yes  Informal Support system:: Significant other  Financial/Insurance:   Financial/Insurance concerns (GOAL):: Yes     Resources and Interventions:  Current Resources: discharge summary  Community Resources: County Worker  Equipment Currently Used at Home: none  Advance Care Plan/Directive  Advanced Care Plans/Directives on file:: No  Advanced Care Plan/Directive Status: Considering Options  Goals: na  Patient/Caregiver understanding: yes  Plan: 1. Patient will follow discharge summary.  2. Patient will follow up sooner should symptoms worsen, change or patient feels there is a need further care.  3. No further Care Coordination needs identified at this time. Patient may be referred to Care Coordination in the future if additional needs arise.  Pt encouraged to contact Care Coordinator through the clinic if situation changes and assistance is needed. No follow-up planned.    CASTILLO Madrid RN Clinic Care Coordinator   [place], [place], [place]  Phone:  [phone]

## 2019-12-19 NOTE — PROGRESS NOTES
"Subjective     Radha Beckwith is a 44 year old female who presents to clinic today for the following health issues:    HPI     ED/UC Followup:    Facility:  [facility]  Date of visit: 2019  Reason for visit: Generalized Weakness  Current Status: Patient states \"I guess I'm a little better\"     She was seen in the ED on 19 for what she thought was serotonin syndrome. She had been googling her symptoms and went in because she thought this is what she was experiencing. It ended up that she was having a panic attack. She has been having more panic attacks recently. Her mom  on  and her son left shortly thereafter for [place]. [place] is a more dangerous area so she worries about him. She is having to sell her home after her mom  and is overwhelmed because the house is a \"disaster\". She has been working more hours but is no longer only working day shift which is the shift she was working so she could be home with her mom at night. She is now closing some nights and done between 9:30-10 pm then starts the next day at 8 am. She finds it hard to wind down and get enough sleep to have energy for the morning shift. When she is not working the next day she will get home around 10 pm then stay up until 6 am watching TV then sleep until 3 pm. She was taking lorazepam for panic attacks but felt that it \"did nothing\". She would like to go back to alprazolam as it seemed to work better for panic attacks. She ends up taking anxiety medication twice daily because she has so much anxiety and if she does not take the anxiety medication she has a panic attack because the anxiety grows so strong. She is taking Zoloft and has been on and off it in the past and never thought that it worked much for her anxiety. She would like to go off this because she has no sex drive and is in a new relationship with her first love who she has known for 30 years. He messaged her " in August and they have been dating since. She is planning to move in with him after she sells the house. She was seeing a counselor at [facility] a while back but she moved. She thought about seeing Kandy Bhatia who used to see her son at [facility] when he was a child.     Answers for HPI/ROS submitted by the patient on 12/23/2019   If you checked off any problems, how difficult have these problems made it for you to do your work, take care of things at home, or get along with other people?: Extremely difficult  PHQ9 TOTAL SCORE: 16  BO 7 TOTAL SCORE: 12      Patient Active Problem List   Diagnosis     GERD (gastroesophageal reflux disease)     Restless legs     Anxiety     Ingrowing nail     Hyperlipidemia LDL goal <100     Hypokalemia     Atypical chest pain     Tobacco abuse     Anxiety attack     Opioid dependence in remission (H)     Papanicolaou smear of cervix with low grade squamous intraepithelial lesion (LGSIL)     S/P hysterectomy     Psychophysiological insomnia     Chronic bilateral low back pain without sciatica     Moderate major depression (H)     Supraventricular tachycardia (H)     Past Surgical History:   Procedure Laterality Date     BIOPSY CERVICAL, LOCAL EXCISION, SINGLE/MULTIPLE N/A 8/10/2017    Procedure: BIOPSY CERVICAL, LOCAL EXCISION, SINGLE/MULTIPLE;;  Surgeon: Michael Chandler MD;  [facility]     COLPOSCOPY, BIOPSY, COMBINED N/A 8/10/2017    Procedure: COMBINED COLPOSCOPY, BIOPSY;  Colposcopy with Cervical Biopsies and Endometrial Biopsy, Exam with Ultrasound;  Surgeon: Michael Chandler MD;  [facility]     ESOPHAGOSCOPY, GASTROSCOPY, DUODENOSCOPY (EGD), COMBINED N/A 4/17/2017    Procedure: COMBINED ESOPHAGOSCOPY, GASTROSCOPY, DUODENOSCOPY (EGD);  Surgeon: Ibrahima Esposito MD;  [facility]     EXAM UNDER ANESTHESIA PELVIC N/A 8/10/2017    Procedure: EXAM UNDER ANESTHESIA PELVIC;;  Surgeon: Michael Chandler MD;  [facility]     [facility] UGI  ENDOSCOPY, SIMPLE EXAM  01/07/08     HYSTERECTOMY       LAPAROSCOPIC CHOLECYSTECTOMY N/A 2/2/2018    Procedure: LAPAROSCOPIC CHOLECYSTECTOMY;  Laparoscopic Cholecystectomy;  Surgeon: Tigre Lowry DO;  [facility]     LAPAROSCOPIC HYSTERECTOMY TOTAL N/A 10/30/2017    Procedure: LAPAROSCOPIC HYSTERECTOMY TOTAL;  LAPAROSCOPIC HYSTERECTOMY TOTAL POSSIBLE SALPINGO-OOPHERECTOMY (BILATERAL);  Surgeon: Michael Chandler MD;  Location: [facility] OR       Social History     Tobacco Use     Smoking status: Current Every Day Smoker     Packs/day: 0.25     Years: 20.00     Pack years: 5.00     Types: Cigarettes     Smokeless tobacco: Never Used     Tobacco comment: 5-6 cigs daily   Substance Use Topics     Alcohol use: Yes     Comment: occasional drinks     Family History   Problem Relation Age of Onset     Depression Mother      Respiratory Mother      Chronic Obstructive Pulmonary Disease Mother      Cerebrovascular Disease Father         Brain anyeurism     Adrenal Disorder Other      Chronic Obstructive Pulmonary Disease Other      Breast Cancer Cousin          Current Outpatient Medications   Medication Sig Dispense Refill     ACETAMINOPHEN PO Take 1,000 mg by mouth every 6 hours as needed for pain       ALPRAZolam (XANAX) 1 MG tablet Take 1 tablet (1 mg) by mouth 2 times daily as needed (panic attacks) 60 tablet 0     buprenorphine-naloxone (SUBOXONE) 2-0.5 MG SUBL sublingual tablet Place 0.5 tablets under the tongue daily   0     cetirizine (ZYRTEC) 10 MG tablet TAKE ONE TABLET BY MOUTH EVERY MORNING 30 tablet 3     cyclobenzaprine (FLEXERIL) 5 MG tablet TAKE 1-2 TABLETS BY MOUTH NIGHTLY AS NEEDED FOR MUSCLE SPASMS 60 tablet 3     metoclopramide (REGLAN) 10 MG tablet TAKE ONE TABLET BY MOUTH THREE TIMES A DAY AS NEEDED 90 tablet 0     omeprazole (PRILOSEC) 40 MG DR capsule Take 1 capsule (40 mg) by mouth daily 30 capsule 3     ondansetron (ZOFRAN) 4 MG tablet TAKE ONE TABLET BY MOUTH every 12 hours AS  "NEEDED FOR NAUSEA OR VOMITING 18 tablet 10     propranolol (INDERAL) 20 MG tablet Take 1 tablet (20 mg) by mouth 2 times daily 180 tablet 3     QUEtiapine (SEROQUEL) 200 MG tablet TAKE ONE TABLET BY MOUTH EVERY NIGHT AT BEDTIME 90 tablet 1     rOPINIRole (REQUIP) 0.5 MG tablet TAKE ONE TABLET BY MOUTH EVERY NIGHT AT BEDTIME 90 tablet 2     sertraline (ZOLOFT) 100 MG tablet TAKE one TABLET BY MOUTH EVERY DAY 90 tablet 3     sertraline (ZOLOFT) 25 MG tablet Take 1 tablet (25 mg) by mouth daily For two weeks then every other day for 2 weeks. 21 tablet 0     simvastatin (ZOCOR) 10 MG tablet Take 1 tablet (10 mg) by mouth At Bedtime 90 tablet 3     zolpidem (AMBIEN) 5 MG tablet TAKE ONE TABLET BY MOUTH NIGHTLY AS NEEDED FOR SLEEP 30 tablet 0     order for DME Blood pressure cuff (Patient not taking: Reported on 6/3/2019) 1 each 0     Allergies   Allergen Reactions     [facility]      12-            GI problems-     Seasonal Allergies      Sumatriptan      vomits after giving herself a shot     Compazine Anxiety     Droperidol Anxiety     Nubain [Nalbuphine Hcl] Anxiety     Prochlorperazine Palpitations     Uncontrolled movement     BP Readings from Last 3 Encounters:   12/23/19 108/66   12/18/19 92/74   11/25/19 129/80    Wt Readings from Last 3 Encounters:   12/23/19 75.7 kg (166 lb 12.8 oz)   12/18/19 77.1 kg (170 lb)   06/03/19 77.8 kg (171 lb 8 oz)                    Reviewed and updated as needed this visit by Provider         Review of Systems   ROS COMP: Constitutional, HEENT, cardiovascular, pulmonary, gi and gu systems are negative, except as otherwise noted.      Objective    /66   Pulse 93   Temp 97.5  F (36.4  C) (Temporal)   Resp 16   Ht 1.651 m (5' 5\")   Wt 75.7 kg (166 lb 12.8 oz)   LMP  (LMP Unknown)   SpO2 97%   BMI 27.76 kg/m    Body mass index is 27.76 kg/m .  Physical Exam   GENERAL: alert and no acute distress  PSYCH: mentation appears normal, tearful, anxious, judgement and " insight intact and appearance well groomed    Diagnostic Test Results:  Labs reviewed in Epic        Assessment & Plan     1. Anxiety  5. Situational depression  She wishes to taper off Zoloft due to sexual side effects. She does not feel it has helped her anxiety in the past but has been on it whenever she is not in a relationship. She feels that her depression symptoms are situational with recent loss of her mother and she plans to schedule counseling appointment with Kandy Bhatia. She has been on benzos for many years and takes them on average twice daily. I discussed with her my concern for depending on the medication to control her anxiety rather than addressing the life stressors and situations that cause her anxiety. She expressed understanding and will schedule counseling and try to work through the things that cause her anxiety. Follow up in 3-6 months.   - sertraline (ZOLOFT) 25 MG tablet; Take 1 tablet (25 mg) by mouth daily For two weeks then every other day for 2 weeks.  Dispense: 21 tablet; Refill: 0  - MENTAL HEALTH REFERRAL  - Adult; Outpatient Treatment; Individual/Couples/Family/Group Therapy/Health Psychology; [facility]: [facility] [phone]; We will contact you to schedule the appointment or please call with any questions    2. Panic attacks  See above notes  - ALPRAZolam (XANAX) 1 MG tablet; Take 1 tablet (1 mg) by mouth 2 times daily as needed (panic attacks)  Dispense: 60 tablet; Refill: 0  - MENTAL HEALTH REFERRAL  - Adult; Outpatient Treatment; Individual/Couples/Family/Group Therapy/Health Psychology; [facility]: [facility] [phone]; We will contact you to schedule the appointment or please call with any questions    3. Screen for STD (sexually transmitted disease)  Patient requested STD screening  - NEISSERIA GONORRHOEA PCR; Future  - CHLAMYDIA TRACHOMATIS PCR; Future  - Treponema Abs w Reflex to RPR and Titer; Future  - HIV Antigen Antibody Combo;  "Future    4. Need for prophylactic vaccination and inoculation against influenza  - INFLUENZA VACCINE IM > 6 MONTHS VALENT IIV4 [66306]  - Vaccine Administration, Initial [27003]       BMI:   Estimated body mass index is 27.76 kg/m  as calculated from the following:    Height as of this encounter: 1.651 m (5' 5\").    Weight as of this encounter: 75.7 kg (166 lb 12.8 oz).   Weight management plan: Discussed healthy diet and exercise guidelines    No follow-ups on file.    JEANNIE Cano [facility]    "

## 2019-12-23 ENCOUNTER — OFFICE VISIT (OUTPATIENT)
Dept: FAMILY MEDICINE | Facility: OTHER | Age: 44
End: 2019-12-23
Payer: COMMERCIAL

## 2019-12-23 VITALS
DIASTOLIC BLOOD PRESSURE: 66 MMHG | HEART RATE: 93 BPM | RESPIRATION RATE: 16 BRPM | SYSTOLIC BLOOD PRESSURE: 108 MMHG | WEIGHT: 166.8 LBS | TEMPERATURE: 97.5 F | OXYGEN SATURATION: 97 % | HEIGHT: 65 IN | BODY MASS INDEX: 27.79 KG/M2

## 2019-12-23 DIAGNOSIS — F41.9 ANXIETY: Primary | ICD-10-CM

## 2019-12-23 DIAGNOSIS — F43.21 SITUATIONAL DEPRESSION: ICD-10-CM

## 2019-12-23 DIAGNOSIS — Z23 NEED FOR PROPHYLACTIC VACCINATION AND INOCULATION AGAINST INFLUENZA: ICD-10-CM

## 2019-12-23 DIAGNOSIS — F41.0 PANIC ATTACKS: ICD-10-CM

## 2019-12-23 DIAGNOSIS — Z11.3 SCREEN FOR STD (SEXUALLY TRANSMITTED DISEASE): ICD-10-CM

## 2019-12-23 PROCEDURE — 90686 IIV4 VACC NO PRSV 0.5 ML IM: CPT | Performed by: STUDENT IN AN ORGANIZED HEALTH CARE EDUCATION/TRAINING PROGRAM

## 2019-12-23 PROCEDURE — 90471 IMMUNIZATION ADMIN: CPT | Performed by: STUDENT IN AN ORGANIZED HEALTH CARE EDUCATION/TRAINING PROGRAM

## 2019-12-23 PROCEDURE — 99214 OFFICE O/P EST MOD 30 MIN: CPT | Mod: 25 | Performed by: STUDENT IN AN ORGANIZED HEALTH CARE EDUCATION/TRAINING PROGRAM

## 2019-12-23 RX ORDER — SERTRALINE HYDROCHLORIDE 25 MG/1
25 TABLET, FILM COATED ORAL DAILY
Qty: 21 TABLET | Refills: 0 | Status: SHIPPED | OUTPATIENT
Start: 2019-12-23 | End: 2020-05-20

## 2019-12-23 RX ORDER — ALPRAZOLAM 1 MG
1 TABLET ORAL 2 TIMES DAILY PRN
Qty: 60 TABLET | Refills: 0 | Status: SHIPPED | OUTPATIENT
Start: 2019-12-23 | End: 2020-01-24

## 2019-12-23 ASSESSMENT — PATIENT HEALTH QUESTIONNAIRE - PHQ9
10. IF YOU CHECKED OFF ANY PROBLEMS, HOW DIFFICULT HAVE THESE PROBLEMS MADE IT FOR YOU TO DO YOUR WORK, TAKE CARE OF THINGS AT HOME, OR GET ALONG WITH OTHER PEOPLE: EXTREMELY DIFFICULT
SUM OF ALL RESPONSES TO PHQ QUESTIONS 1-9: 16
SUM OF ALL RESPONSES TO PHQ QUESTIONS 1-9: 16

## 2019-12-23 ASSESSMENT — ANXIETY QUESTIONNAIRES
6. BECOMING EASILY ANNOYED OR IRRITABLE: MORE THAN HALF THE DAYS
7. FEELING AFRAID AS IF SOMETHING AWFUL MIGHT HAPPEN: NEARLY EVERY DAY
3. WORRYING TOO MUCH ABOUT DIFFERENT THINGS: MORE THAN HALF THE DAYS
5. BEING SO RESTLESS THAT IT IS HARD TO SIT STILL: NOT AT ALL
GAD7 TOTAL SCORE: 12
GAD7 TOTAL SCORE: 12
1. FEELING NERVOUS, ANXIOUS, OR ON EDGE: NEARLY EVERY DAY
GAD7 TOTAL SCORE: 12
7. FEELING AFRAID AS IF SOMETHING AWFUL MIGHT HAPPEN: NEARLY EVERY DAY
2. NOT BEING ABLE TO STOP OR CONTROL WORRYING: MORE THAN HALF THE DAYS
4. TROUBLE RELAXING: NOT AT ALL

## 2019-12-23 ASSESSMENT — MIFFLIN-ST. JEOR: SCORE: 1397.48

## 2019-12-23 NOTE — LETTER
My Depression Action Plan  Name: Radha Beckwith   Date of Birth 1975  Date: 12/19/2019    My doctor: Marisa Story   My clinic: [facility]  [address], [place] [place] [facility] [address] [phone]          GREEN    ZONE   Good Control    What it looks like:     Things are going generally well. You have normal up s and down s. You may even feel depressed from time to time, but bad moods usually last less than a day.   What you need to do:  1. Continue to care for yourself (see self care plan)  2. Check your depression survival kit and update it as needed  3. Follow your physician s recommendations including any medication.  4. Do not stop taking medication unless you consult with your physician first.           YELLOW         ZONE Getting Worse    What it looks like:     Depression is starting to interfere with your life.     It may be hard to get out of bed; you may be starting to isolate yourself from others.    Symptoms of depression are starting to last most all day and this has happened for several days.     You may have suicidal thoughts but they are not constant.   What you need to do:     1. Call your care team, your response to treatment will improve if you keep your care team informed of your progress. Yellow periods are signs an adjustment may need to be made.     2. Continue your self-care, even if you have to fake it!    3. Talk to someone in your support network    4. Open up your depression survival kit           RED    ZONE Medical Alert - Get Help    What it looks like:     Depression is seriously interfering with your life.     You may experience these or other symptoms: You can t get out of bed most days, can t work or engage in other necessary activities, you have trouble taking care of basic hygiene, or basic responsibilities, thoughts of suicide or death that will not go away, self-injurious behavior.     What you need to do:  1. Call your  care team and request a same-day appointment. If they are not available (weekends or after hours) call your local crisis line, emergency room or 911.            Depression Self Care Plan / Survival Kit    Self-Care for Depression  Here s the deal. Your body and mind are really not as separate as most people think.  What you do and think affects how you feel and how you feel influences what you do and think. This means if you do things that people who feel good do, it will help you feel better.  Sometimes this is all it takes.  There is also a place for medication and therapy depending on how severe your depression is, so be sure to consult with your medical provider and/ or Behavioral Health Consultant if your symptoms are worsening or not improving.     In order to better manage my stress, I will:    Exercise  Get some form of exercise, every day. This will help reduce pain and release endorphins, the  feel good  chemicals in your brain. This is almost as good as taking antidepressants!  This is not the same as joining a gym and then never going! (they count on that by the way ) It can be as simple as just going for a walk or doing some gardening, anything that will get you moving.      Hygiene   Maintain good hygiene (Get out of bed in the morning, Make your bed, Brush your teeth, Take a shower, and Get dressed like you were going to work, even if you are unemployed).  If your clothes don't fit try to get ones that do.    Diet  I will strive to eat foods that are good for me, drink plenty of water, and avoid excessive sugar, caffeine, alcohol, and other mood-altering substances.  Some foods that are helpful in depression are: complex carbohydrates, B vitamins, flaxseed, fish or fish oil, fresh fruits and vegetables.    Psychotherapy  I agree to participate in Individual Therapy (if recommended).    Medication  If prescribed medications, I agree to take them.  Missing doses can result in serious side effects.  I  understand that drinking alcohol, or other illicit drug use, may cause potential side effects.  I will not stop my medication abruptly without first discussing it with my provider.    Staying Connected With Others  I will stay in touch with my friends, family members, and my primary care provider/team.    Use your imagination  Be creative.  We all have a creative side; it doesn t matter if it s oil painting, sand castles, or mud pies! This will also kick up the endorphins.    Witness Beauty  (AKA stop and smell the roses) Take a look outside, even in mid-winter. Notice colors, textures. Watch the squirrels and birds.     Service to others  Be of service to others.  There is always someone else in need.  By helping others we can  get out of ourselves  and remember the really important things.  This also provides opportunities for practicing all the other parts of the program.    Humor  Laugh and be silly!  Adjust your TV habits for less news and crime-drama and more comedy.    Control your stress  Try breathing deep, massage therapy, biofeedback, and meditation. Find time to relax each day.     My support system    Clinic Contact:  Phone number:    Contact 1:  Phone number:    Contact 2:  Phone number:    Buddhism/:  Phone number:    Therapist:  Phone number:    Local crisis center:    Phone number:    Other community support:  Phone number:

## 2019-12-24 ASSESSMENT — ANXIETY QUESTIONNAIRES: GAD7 TOTAL SCORE: 12

## 2019-12-24 ASSESSMENT — PATIENT HEALTH QUESTIONNAIRE - PHQ9: SUM OF ALL RESPONSES TO PHQ QUESTIONS 1-9: 16

## 2019-12-24 NOTE — PATIENT INSTRUCTIONS
Taper Zoloft to 1/2 tablet daily for weeks. On January 6th, start 25 mg tablets of Zoloft for 2 weeks then every other day for 2 weeks then stop.    Schedule an appointment with Kandy Bhatia.    Switch to alprazolam up to twice daily for panic attacks.    Marisa Story, KIANNA-C

## 2020-01-24 DIAGNOSIS — F41.9 ANXIETY: ICD-10-CM

## 2020-01-24 DIAGNOSIS — F41.0 PANIC ATTACKS: ICD-10-CM

## 2020-01-24 DIAGNOSIS — G89.29 CHRONIC NECK PAIN: ICD-10-CM

## 2020-01-24 DIAGNOSIS — R11.0 NAUSEA: Primary | ICD-10-CM

## 2020-01-24 DIAGNOSIS — M54.2 CHRONIC NECK PAIN: ICD-10-CM

## 2020-01-24 RX ORDER — ONDANSETRON 4 MG/1
4 TABLET, ORALLY DISINTEGRATING ORAL EVERY 6 HOURS PRN
Qty: 20 TABLET | Refills: 0 | Status: SHIPPED | OUTPATIENT
Start: 2020-01-24 | End: 2020-03-05

## 2020-01-24 RX ORDER — CYCLOBENZAPRINE HCL 5 MG
TABLET ORAL
Qty: 60 TABLET | Refills: 3 | Status: SHIPPED | OUTPATIENT
Start: 2020-01-24 | End: 2020-05-14

## 2020-01-24 RX ORDER — ALPRAZOLAM 1 MG
TABLET ORAL
Qty: 60 TABLET | Refills: 0 | Status: SHIPPED | OUTPATIENT
Start: 2020-01-24 | End: 2020-02-20

## 2020-01-24 NOTE — TELEPHONE ENCOUNTER
Reason for Call:  Medication or medication refill:    Do you use a [facility]?  Name of the pharmacy and phone number for the current request:  [facility] - [phone]    Name of the medication requested: alprazolam Zofran flexeril    Other request: patient is wondering if these could be refilled today.    Can we leave a detailed message on this number? YES    Phone number patient can be reached at: Home number on file [phone] (home)    Best Time: any    Call taken on 1/24/2020 at 2:23 PM by Kim Menjivar

## 2020-01-24 NOTE — TELEPHONE ENCOUNTER
Pending Prescriptions:                       Disp   Refills    ALPRAZolam (XANAX) 1 MG tablet [Pharmacy *60 tab*0            Sig: TAKE ONE TABLET BY MOUTH TWICE A DAY AS NEEDED           FOR PANIC ATTACKS    Last Written Prescription Date:  12/23/2019  Last Fill Quantity: 60,  # refills: 0   Last office visit: 12/23/2019 with prescribing provider:     Future Office Visit:            cyclobenzaprine (FLEXERIL) 5 MG tablet [P*60 tab*3            Sig: TAKE 1-2 TABLETS BY MOUTH NIGHTLY AS NEEDED FOR           MUSCLE SPASMS    Last Written Prescription Date:  10/21/2019  Last Fill Quantity: 60,  # refills: 3   Last office visit: 12/23/2019 with prescribing provider:     Future Office Visit:        Debra Ramon, MSN, RN

## 2020-02-05 DIAGNOSIS — F51.04 PSYCHOPHYSIOLOGICAL INSOMNIA: ICD-10-CM

## 2020-02-05 NOTE — TELEPHONE ENCOUNTER
Pending Prescriptions:                       Disp   Refills    zolpidem (AMBIEN) 5 MG tablet [Pharmacy Me*30 tab*0        Sig: TAKE ONE TABLET BY MOUTH NIGHTLY AS NEEDED FOR SLEEP    Last Written Prescription Date:  12/19/2019  Last Fill Quantity: 30,  # refills: 0   Last office visit: 12/23/2019 with prescribing provider:     Future Office Visit:      Routing refill request to provider for review/approval because:  Drug not on the [facility] refill protocol     Debra Ramon, MSN, RN

## 2020-02-06 RX ORDER — ZOLPIDEM TARTRATE 5 MG/1
TABLET ORAL
Qty: 30 TABLET | Refills: 5 | Status: SHIPPED | OUTPATIENT
Start: 2020-02-06 | End: 2020-08-12

## 2020-02-13 ENCOUNTER — TELEPHONE (OUTPATIENT)
Dept: FAMILY MEDICINE | Facility: OTHER | Age: 45
End: 2020-02-13

## 2020-02-13 DIAGNOSIS — K21.9 GASTROESOPHAGEAL REFLUX DISEASE, ESOPHAGITIS PRESENCE NOT SPECIFIED: Primary | ICD-10-CM

## 2020-02-13 NOTE — TELEPHONE ENCOUNTER
Plans limitations on omeprazole of 120 days per 365 days.    Consider a change in medication, or begin the prior auth process.    Thanks.

## 2020-02-18 NOTE — TELEPHONE ENCOUNTER
Central Prior Authorization Team   Phone: [phone]      PA Initiation    Medication: omeprazole-Initiated  Insurance Company: [employer] - Phone [phone] Fax [phone]  Pharmacy Filling the Rx: [facility] - [id number]  Filling Pharmacy Phone: [phone]  Filling Pharmacy Fax:    Start Date: 2/18/2020

## 2020-02-19 RX ORDER — FAMOTIDINE 40 MG/1
40 TABLET, FILM COATED ORAL DAILY
Qty: 30 TABLET | Refills: 11 | Status: SHIPPED | OUTPATIENT
Start: 2020-02-19 | End: 2021-03-02

## 2020-02-19 NOTE — TELEPHONE ENCOUNTER
PRIOR AUTHORIZATION DENIED    Medication: omeprazole-DENIED    Denial Date: 2/18/2020    Denial Rational: Requested quantity is larger/greater than allowed by insurance.  All PPIs are covered up to 1 per day for 120 days within 365 days.  If an appeal is desired please let the PA team know when a letter of medical necessity has been written explaining why the patient needs more than the 120 per 365.          Appeal Information:

## 2020-02-20 DIAGNOSIS — F41.0 PANIC ATTACKS: ICD-10-CM

## 2020-02-20 RX ORDER — ALPRAZOLAM 1 MG
TABLET ORAL
Qty: 60 TABLET | Refills: 0 | Status: SHIPPED | OUTPATIENT
Start: 2020-02-24 | End: 2020-03-20

## 2020-02-20 NOTE — TELEPHONE ENCOUNTER
Please call patient and let her know that the PA for omeprazole was denied by insurance. I sent in a prescription for famotidine but am not sure if insurance will cover it. Otherwise she can buy OTC omeprazole.  Marisa Story, YARELISC

## 2020-02-21 NOTE — TELEPHONE ENCOUNTER
Pending Prescriptions:                       Disp   Refills    ALPRAZOLAM 1 MG PO tablet [Pharmacy Med Na*60 tab*0        Sig: TAKE ONE TABLET BY MOUTH TWICE A DAY AS NEEDED FOR           PANIC ATTACKS    Routing refill request to provider for review/approval because:  Drug not on the [facility] refill protocol   Last filled 3 weeks ago

## 2020-02-21 NOTE — TELEPHONE ENCOUNTER
Spoke to patient and advised her of message below. Patient verbalized understanding. She states that she picked up famotidine and insurance covered it.

## 2020-03-04 ENCOUNTER — TELEPHONE (OUTPATIENT)
Dept: FAMILY MEDICINE | Facility: OTHER | Age: 45
End: 2020-03-04

## 2020-03-04 DIAGNOSIS — E78.2 MIXED HYPERLIPIDEMIA: ICD-10-CM

## 2020-03-04 DIAGNOSIS — F41.9 ANXIETY: ICD-10-CM

## 2020-03-04 DIAGNOSIS — F51.04 PSYCHOPHYSIOLOGICAL INSOMNIA: ICD-10-CM

## 2020-03-04 DIAGNOSIS — R11.0 NAUSEA: ICD-10-CM

## 2020-03-05 RX ORDER — ONDANSETRON 4 MG/1
TABLET, ORALLY DISINTEGRATING ORAL
Qty: 20 TABLET | Refills: 0 | Status: SHIPPED | OUTPATIENT
Start: 2020-03-05 | End: 2020-05-14

## 2020-03-05 RX ORDER — QUETIAPINE FUMARATE 200 MG/1
TABLET, FILM COATED ORAL
Qty: 90 TABLET | Refills: 0 | Status: SHIPPED | OUTPATIENT
Start: 2020-03-05 | End: 2020-06-11

## 2020-03-05 RX ORDER — SIMVASTATIN 10 MG
TABLET ORAL
Qty: 90 TABLET | Refills: 0 | Status: SHIPPED | OUTPATIENT
Start: 2020-03-05 | End: 2020-07-15

## 2020-03-05 NOTE — TELEPHONE ENCOUNTER
Pending Prescriptions:                       Disp   Refills    ondansetron (ZOFRAN-ODT) 4 MG ODT tab [Pha*20 tab*0        Sig: DISSOLVE ONE TABLET ON TONGUE EVERY 6 HOURS AS NEEDED           FOR NAUSEA    simvastatin (ZOCOR) 10 MG tablet [Pharmacy*90 tab*3        Sig: TAKE ONE TABLET BY MOUTH EVERY NIGHT AT BEDTIME    QUEtiapine (SEROQUEL) 200 MG tablet [Pharm*90 tab*1        Sig: TAKE ONE TABLET BY MOUTH EVERY NIGHT AT BEDTIME    Routing refill request to provider for review/approval because:  Lipid panel on file within the past 12 months          Recent Labs   Lab Test 02/11/19  1344   CHOL 195   TRIG 409*   HDL 22*   LDL Cannot estimate LDL when triglyceride exceeds 400 mg/dL  126*   NHDL 173*

## 2020-03-06 NOTE — TELEPHONE ENCOUNTER
Please call patient and let her know that she will be due for annual physical with fasting labs. I sent in 3 months of her prescription to give her time to get in.  Marisa Story, KIANNA-C

## 2020-03-19 DIAGNOSIS — F41.0 PANIC ATTACKS: ICD-10-CM

## 2020-03-19 NOTE — TELEPHONE ENCOUNTER
Pending Prescriptions:                       Disp   Refills    ALPRAZolam (XANAX) 1 MG tablet [Pharmacy M*60 tab*0        Sig: TAKE ONE TABLET BY MOUTH TWICE A DAY AS NEEDED FOR           PANIC ATTACKS (CAN FILL ON 2/24/20)    Last Written Prescription Date:  2/24/20  Last Fill Quantity: 60,  # refills: 0   Last office visit: 12/23/2019 with prescribing provider:     Future Office Visit:      Routing refill request to provider for review/approval because:  Drug not on the [facility] refill protocol     Debra Ramon, MSN, RN

## 2020-03-20 RX ORDER — ALPRAZOLAM 1 MG
TABLET ORAL
Qty: 60 TABLET | Refills: 0 | Status: SHIPPED | OUTPATIENT
Start: 2020-03-23 | End: 2020-04-17

## 2020-03-23 ENCOUNTER — TELEPHONE (OUTPATIENT)
Dept: FAMILY MEDICINE | Facility: OTHER | Age: 45
End: 2020-03-23

## 2020-03-27 ENCOUNTER — VIRTUAL VISIT (OUTPATIENT)
Dept: URGENT CARE | Facility: CLINIC | Age: 45
End: 2020-03-27
Payer: COMMERCIAL

## 2020-03-27 DIAGNOSIS — Z20.822 SUSPECTED COVID-19 VIRUS INFECTION: Primary | ICD-10-CM

## 2020-03-27 PROCEDURE — 99207 ZZC NO BILLABLE SERVICE THIS VISIT: CPT | Mod: TEL | Performed by: STUDENT IN AN ORGANIZED HEALTH CARE EDUCATION/TRAINING PROGRAM

## 2020-03-27 NOTE — PROGRESS NOTES
"The patient has been notified of following:     \"This telephone visit will be conducted via a call between you and your physician/provider. We have found that certain health care needs can be provided without the need for a physical exam.  This service lets us provide the care you need with a short phone conversation.  If a prescription is necessary we can send it directly to your pharmacy.  If lab work is needed we can place an order for that and you can then stop by our lab to have the test done at a later time.    If during the course of the call the physician/provider feels a telephone visit is not appropriate, you will not be charged for this service.\"     Subjective     CC: Radha Beckwith  is a 44 year old female who presents to clinic today for the following health issues:   Chief Complaint   Patient presents with     Suspected Covid      History:    Patient reports she went to work on 3/19 and was sent home with fever of 102.7, that evening was up all night with dry cough and fevers/sweats.  Felt as thought she couldn't breath as \"someone was pushing on her chest.\"  Has been home from work since that time. Since that time symptoms wax and wane.      Today had \"cold sweats\" and temperature of 99.1, skin feels cold and clammy.  Last time fever greater than 99.5 and 3/25. Feeling of shortness of breath and chest pain has improved but will come back when she has coughing spell.  Also endorses sore throat. Denies myalgias but has some soreness when she sits down for a while. Has allergies so has some longstanding nasal congestion and headaches. Also endorses fatigue.     Has son in  stationed in [place] and visited her from 2/10-2/28, son had no symptoms.      Lives with boyfriend. Denies sick contacts.  Denies contact with COVID patient. Denies recent travel,    Denies history of asthma, bronchitis, lung disease. No recent hospitalizations. No chance of pregnancy (history of hysterectomy).     Smokes " 1-2 cigarettes/day.     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     Patient Active Problem List   Diagnosis     GERD (gastroesophageal reflux disease)     Restless legs     Anxiety     Ingrowing nail     Hyperlipidemia LDL goal <100     Hypokalemia     Atypical chest pain     Tobacco abuse     Anxiety attack     Opioid dependence in remission (H)     Papanicolaou smear of cervix with low grade squamous intraepithelial lesion (LGSIL)     S/P hysterectomy     Psychophysiological insomnia     Chronic bilateral low back pain without sciatica     Moderate major depression (H)     Supraventricular tachycardia (H)     Panic attacks     Situational depression     Past Surgical History:   Procedure Laterality Date     BIOPSY CERVICAL, LOCAL EXCISION, SINGLE/MULTIPLE N/A 8/10/2017    Procedure: BIOPSY CERVICAL, LOCAL EXCISION, SINGLE/MULTIPLE;;  Surgeon: Michael Chandler MD;  Location: [facility] OR     COLPOSCOPY, BIOPSY, COMBINED N/A 8/10/2017    Procedure: COMBINED COLPOSCOPY, BIOPSY;  Colposcopy with Cervical Biopsies and Endometrial Biopsy, Exam with Ultrasound;  Surgeon: Michael Chandler MD;  Location: [facility] OR     ESOPHAGOSCOPY, GASTROSCOPY, DUODENOSCOPY (EGD), COMBINED N/A 4/17/2017    Procedure: COMBINED ESOPHAGOSCOPY, GASTROSCOPY, DUODENOSCOPY (EGD);  Surgeon: Ibrahima Esposito MD;  Location: [facility] GI     EXAM UNDER ANESTHESIA PELVIC N/A 8/10/2017    Procedure: EXAM UNDER ANESTHESIA PELVIC;;  Surgeon: Michael Chandler MD;  Location: [facility] OR     [facility] UGI ENDOSCOPY, SIMPLE EXAM  01/07/08     HYSTERECTOMY       LAPAROSCOPIC CHOLECYSTECTOMY N/A 2/2/2018    Procedure: LAPAROSCOPIC CHOLECYSTECTOMY;  Laparoscopic Cholecystectomy;  Surgeon: Tigre Lowyr DO;  Location: [facility] OR     LAPAROSCOPIC HYSTERECTOMY TOTAL N/A 10/30/2017    Procedure: LAPAROSCOPIC HYSTERECTOMY TOTAL;  LAPAROSCOPIC HYSTERECTOMY TOTAL POSSIBLE SALPINGO-OOPHERECTOMY (BILATERAL);  Surgeon: Michael Chandler,  MD;  Location:  OR       Social History     Tobacco Use     Smoking status: Current Every Day Smoker     Packs/day: 0.25     Years: 20.00     Pack years: 5.00     Types: Cigarettes     Smokeless tobacco: Never Used     Tobacco comment: 5-6 cigs daily   Substance Use Topics     Alcohol use: Yes     Comment: occasional drinks     Family History   Problem Relation Age of Onset     Depression Mother      Respiratory Mother      Chronic Obstructive Pulmonary Disease Mother      Cerebrovascular Disease Father         Brain anyeurism     Adrenal Disorder Other      Chronic Obstructive Pulmonary Disease Other      Breast Cancer Cousin          Current Outpatient Medications   Medication Sig Dispense Refill     ACETAMINOPHEN PO Take 1,000 mg by mouth every 6 hours as needed for pain       ALPRAZolam (XANAX) 1 MG tablet TAKE ONE TABLET BY MOUTH TWICE A DAY AS NEEDED FOR PANIC ATTACKS (CAN FILL ON 2/24/20) 60 tablet 0     buprenorphine-naloxone (SUBOXONE) 2-0.5 MG SUBL sublingual tablet Place 0.5 tablets under the tongue daily   0     cetirizine (ZYRTEC) 10 MG tablet TAKE ONE TABLET BY MOUTH EVERY MORNING 30 tablet 3     cyclobenzaprine (FLEXERIL) 5 MG tablet TAKE 1-2 TABLETS BY MOUTH NIGHTLY AS NEEDED FOR MUSCLE SPASMS 60 tablet 3     famotidine 40 MG PO tablet Take 1 tablet (40 mg) by mouth daily 30 tablet 11     metoclopramide (REGLAN) 10 MG tablet TAKE ONE TABLET BY MOUTH THREE TIMES A DAY AS NEEDED 90 tablet 0     omeprazole (PRILOSEC) 40 MG DR capsule Take 1 capsule (40 mg) by mouth daily 30 capsule 3     ondansetron (ZOFRAN) 4 MG tablet TAKE ONE TABLET BY MOUTH every 12 hours AS NEEDED FOR NAUSEA OR VOMITING 18 tablet 10     ondansetron (ZOFRAN-ODT) 4 MG ODT tab DISSOLVE ONE TABLET ON TONGUE EVERY 6 HOURS AS NEEDED FOR NAUSEA 20 tablet 0     order for DME Blood pressure cuff (Patient not taking: Reported on 6/3/2019) 1 each 0     propranolol (INDERAL) 20 MG tablet Take 1 tablet (20 mg) by mouth 2 times daily 180  tablet 3     QUEtiapine (SEROQUEL) 200 MG tablet TAKE ONE TABLET BY MOUTH EVERY NIGHT AT BEDTIME 90 tablet 0     rOPINIRole (REQUIP) 0.5 MG tablet TAKE ONE TABLET BY MOUTH EVERY NIGHT AT BEDTIME 90 tablet 2     sertraline (ZOLOFT) 100 MG tablet TAKE one TABLET BY MOUTH EVERY DAY 90 tablet 3     sertraline (ZOLOFT) 25 MG tablet Take 1 tablet (25 mg) by mouth daily For two weeks then every other day for 2 weeks. 21 tablet 0     simvastatin (ZOCOR) 10 MG tablet TAKE ONE TABLET BY MOUTH EVERY NIGHT AT BEDTIME 90 tablet 0     zolpidem (AMBIEN) 5 MG tablet TAKE ONE TABLET BY MOUTH NIGHTLY AS NEEDED FOR SLEEP 30 tablet 5     Review of Systems   ROS COMP: Constitutional, HEENT, cardiovascular, pulmonary, GI, , musculoskeletal, neuro, skin, endocrine and psych systems are negative, except as otherwise noted.      Objective    Gen: Patient is alert, oriented  Resp: Speaking full sentence, no audible shortness of breath.  No cough of wheeze.    Assessment/Plan:  1. Suspected Covid-19 Virus Infection  Patient reports fevers, cough, shortness of breath for 1 week with symptoms concerning for viral URI such as COVID-19. Reassuring that patient symptoms seem to be improving.  Patient given information on self-isolation, managing symptoms at home, and return to work precautions. [facility] referral placed for symptom monitoring. Patient also cautioned to go to urgent care if shortness of breath worsened.   - COVID-19 [facility] Referral; Future    Phone call duration:  20 minutes    Plan of care discussed with Dr. Gallegos.    Pipo Snyder MD  Internal Medicine, PGY-3

## 2020-03-27 NOTE — PROGRESS NOTES
Preceptor Attestation:   Patient discussed with the resident. Assessment and plan reviewed with resident and agreed upon.   Supervising Physician:  Ibrahima Gallegos MD  [facility]  Department of Family Medicine and [facility]

## 2020-03-27 NOTE — LETTER
March 27, 2020      Radha Beckwith  [address]  [place] [address]        To Whom It May Concern:    Radha Beckwith was seen in our clinic. She may return to work on 3/3/20.     Restrictions prior to return to work: Must be 72 hours without a fever and significant improvement in cough or shortness of breath. This may be after the date listed above.    Sincerely,        Louann Snyder MD

## 2020-03-27 NOTE — PATIENT INSTRUCTIONS
- Stay home, self isolate, and manage your symptoms as below  - If have worsening shortness of breath would make appointment with urgent care (call [phone] and explicitly tell them you were referred from a provider from [facility] or [facility] to be seen IN PERSON in Urgent Care.     Instructions for Patients  Your symptoms could be due to COVID-19, but it also could be due to a number of other respiratory illnesses.  We are not doing testing at this time for COVID-19 unless symptoms are severe enough to require hospitalization.  It is recommended that you stay home to prevent the spread of your illness.  To do this follow these instructions:    Regardless of if you have been tested or not:  Patient who have symptoms (cough, fever, or shortness of breath), need to isolate for 7 days from when symptoms started AND 72 hours after fever resolves (without fever reducing medications) AND improvement of respiratory symptoms (whichever is longer).      Isolate yourself at home (in own room/own bathroom if possible)    Do Not allow any visitors    Do Not go to work or school    Do Not go to Mu-ism,  centers, shopping, or other public places.    Do Not shake hands.    Avoid close and intimate contact with others (hugging, kissing).    Follow CDC recommendations for household cleaning of frequently touched services.     After the initial 7 days, continue to isolate yourself from household members as much as possible. To continue decrease the risk of community spread and exposure, you and any members of your household should limit activities in public for 14 days after starting home isolation.     You can reference the following CDC link for helpful home isolation/care tips:  https://www.cdc.gov/coronavirus/2019-ncov/downloads/10Things.pdf    Protect Others:    Cover your mouth and nose with a mask, disposable tissue or wash cloth to avoid spreading germs to others.    Wash your hands and face frequently  with soap and water.    Fever Medicines:    For fever relief, take acetaminophen or ibuprofen.    Treat fevers above 101  F (38.3  C) to lower fevers and make you more comfortable.     Acetaminophen (e.g., Tylenol): Take 650 mg (two 325 mg pills) by mouth every 4-6 hours as needed of regular strength Tylenol or 1,000 mg (two 500 mg pills) every 8 hours as needed of Extra Strength Tylenol.     Ibuprofen (e.g., Motrin, Advil): Take 400 mg (two 200 mg pills) by mouth every 6 hours as needed.     Acetaminophen is thought to be safer than ibuprofen or naproxen for people over 65 years old. Acetaminophen is in many OTC and prescription medicines. It might be in more than one medicine that you are taking. You need to be careful and not take an overdose. Before taking any medicine, read all the instructions on the package.    Caution - NSAIDs (e.g., ibuprofen, naproxen): Do not take nonsteroidal anti-inflammatory drugs (NSAIDs) if you have stomach problems, kidney disease, heart failure, or other contraindications to using this type of medicine. Do not take NSAID medicines for over 7 days without consulting your PCP. Do not take NSAID medicines if you are pregnant. Do not take NSAID medicines if you are also taking blood thinners.     Call Back If: Breathing difficulty develops or you become worse.    Thank you for limiting contact with others, wearing a simple mask to cover your cough, practice good hand hygiene habits and accessing our virtual services where possible to limit the spread of this virus.    For more information about COVID19 and options for caring for yourself at home, please visit the CDC website at https://www.cdc.gov/coronavirus/2019-ncov/about/steps-when-sick.html  For more options for care at [facility], please visit our website at [email or web address]

## 2020-04-17 DIAGNOSIS — F41.0 PANIC ATTACKS: ICD-10-CM

## 2020-04-17 RX ORDER — ALPRAZOLAM 1 MG
TABLET ORAL
Qty: 60 TABLET | Refills: 0 | Status: SHIPPED | OUTPATIENT
Start: 2020-04-20 | End: 2020-06-16

## 2020-04-17 NOTE — TELEPHONE ENCOUNTER
Pending Prescriptions:                       Disp   Refills    ALPRAZolam (XANAX) 1 MG tablet [Pharmacy M*60 tab*0        Sig: TAKE ONE TABLET BY MOUTH TWICE A DAY AS NEEDED FOR           PANIC ATTACKS    Last Written Prescription Date:  3/23/20  Last Fill Quantity: 30,  # refills: 0   Last office visit: 12/23/2019 with prescribing provider:     Future Office Visit:      Routing refill request to provider for review/approval because:  Drug not on the [facility] refill protocol     Debra Ramon, MSN, RN

## 2020-05-13 DIAGNOSIS — G89.29 CHRONIC NECK PAIN: ICD-10-CM

## 2020-05-13 DIAGNOSIS — G25.81 RESTLESS LEGS SYNDROME (RLS): ICD-10-CM

## 2020-05-13 DIAGNOSIS — F41.9 ANXIETY: ICD-10-CM

## 2020-05-13 DIAGNOSIS — M54.2 CHRONIC NECK PAIN: ICD-10-CM

## 2020-05-13 DIAGNOSIS — R11.0 NAUSEA: ICD-10-CM

## 2020-05-13 NOTE — TELEPHONE ENCOUNTER
Pending Prescriptions:                       Disp   Refills    ondansetron (ZOFRAN-ODT) 4 MG ODT tab [Ph*20 tab*0            Sig: DISSOLVE ONE TABLET BY MOUTH EVERY 6 HOURS AS           NEEDED FOR NAUSEA    Prescription approved per [facility] Refill Protocol.      rOPINIRole (REQUIP) 0.5 MG tablet [Pharma*90 tab*2            Sig: TAKE ONE TABLET BY MOUTH EVERY NIGHT AT BEDTIME    Prescription approved per [facility] Refill Protocol.        cyclobenzaprine (FLEXERIL) 5 MG tablet [P*60 tab*3            Sig: TAKE 1-2 TABLETS BY MOUTH NIGHTLY AS NEEDED FOR           MUSCLE SPASMS    Last Written Prescription Date:  1/24/20  Last Fill Quantity: 60,  # refills: 3   Last office visit: 12/23/2019 with prescribing provider:     Future Office Visit:      Debra Ramon, MSN, RN

## 2020-05-14 RX ORDER — CYCLOBENZAPRINE HCL 5 MG
TABLET ORAL
Qty: 60 TABLET | Refills: 1 | Status: SHIPPED | OUTPATIENT
Start: 2020-05-14 | End: 2020-07-15

## 2020-05-14 RX ORDER — ROPINIROLE 0.5 MG/1
TABLET, FILM COATED ORAL
Qty: 90 TABLET | Refills: 1 | Status: SHIPPED | OUTPATIENT
Start: 2020-05-14 | End: 2020-11-25

## 2020-05-14 RX ORDER — ONDANSETRON 4 MG/1
TABLET, ORALLY DISINTEGRATING ORAL
Qty: 20 TABLET | Refills: 0 | Status: SHIPPED | OUTPATIENT
Start: 2020-05-14 | End: 2020-06-11

## 2020-05-18 ENCOUNTER — TELEPHONE (OUTPATIENT)
Dept: FAMILY MEDICINE | Facility: OTHER | Age: 45
End: 2020-05-18

## 2020-05-18 DIAGNOSIS — F41.0 PANIC ATTACKS: ICD-10-CM

## 2020-05-18 RX ORDER — ALPRAZOLAM 1 MG
TABLET ORAL
Qty: 60 TABLET | Refills: 0 | OUTPATIENT
Start: 2020-05-18

## 2020-05-18 NOTE — TELEPHONE ENCOUNTER
Pending Prescriptions:                       Disp   Refills    ALPRAZolam (XANAX) 1 MG tablet [Pharmacy M*60 tab*0        Sig: TAKE ONE TABLET BY MOUTH TWICE A DAY AS NEEDED FOR           PANIC ATTACKS    Routing refill request to provider for review/approval because:  Drug not on the [facility] refill protocol
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
## 2020-05-20 ENCOUNTER — VIRTUAL VISIT (OUTPATIENT)
Dept: FAMILY MEDICINE | Facility: OTHER | Age: 45
End: 2020-05-20
Payer: COMMERCIAL

## 2020-05-20 DIAGNOSIS — F33.1 MODERATE EPISODE OF RECURRENT MAJOR DEPRESSIVE DISORDER (H): ICD-10-CM

## 2020-05-20 DIAGNOSIS — F41.0 PANIC ATTACKS: Primary | ICD-10-CM

## 2020-05-20 DIAGNOSIS — F41.9 ANXIETY: ICD-10-CM

## 2020-05-20 DIAGNOSIS — S00.86XA BUG BITE OF FACE WITHOUT INFECTION: ICD-10-CM
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
## 2020-05-20 PROCEDURE — 99214 OFFICE O/P EST MOD 30 MIN: CPT | Mod: 95 | Performed by: STUDENT IN AN ORGANIZED HEALTH CARE EDUCATION/TRAINING PROGRAM

## 2020-05-20 PROCEDURE — 96127 BRIEF EMOTIONAL/BEHAV ASSMT: CPT | Mod: 95 | Performed by: STUDENT IN AN ORGANIZED HEALTH CARE EDUCATION/TRAINING PROGRAM

## 2020-05-20 RX ORDER — ALPRAZOLAM 0.5 MG
0.5 TABLET ORAL 2 TIMES DAILY PRN
Qty: 60 TABLET | Refills: 0 | Status: SHIPPED | OUTPATIENT
Start: 2020-05-20 | End: 2020-06-18

## 2020-05-20 RX ORDER — BUSPIRONE HYDROCHLORIDE 10 MG/1
TABLET ORAL
Qty: 90 TABLET | Refills: 1 | Status: SHIPPED | OUTPATIENT
Start: 2020-05-20 | End: 2020-07-15 | Stop reason: SINTOL

## 2020-05-20 RX ORDER — ALPRAZOLAM 1 MG
TABLET ORAL
Qty: 60 TABLET | Refills: 0 | Status: CANCELLED | OUTPATIENT
Start: 2020-05-20

## 2020-05-20 RX ORDER — BUPROPION HYDROCHLORIDE 150 MG/1
150 TABLET ORAL EVERY MORNING
Qty: 30 TABLET | Refills: 1 | Status: SHIPPED | OUTPATIENT
Start: 2020-05-20 | End: 2020-07-15

## 2020-05-20 RX ORDER — PREDNISONE 20 MG/1
40 TABLET ORAL DAILY
Qty: 10 TABLET | Refills: 0 | Status: SHIPPED | OUTPATIENT
Start: 2020-05-20 | End: 2020-06-16

## 2020-05-20 ASSESSMENT — ANXIETY QUESTIONNAIRES
2. NOT BEING ABLE TO STOP OR CONTROL WORRYING: NEARLY EVERY DAY
IF YOU CHECKED OFF ANY PROBLEMS ON THIS QUESTIONNAIRE, HOW DIFFICULT HAVE THESE PROBLEMS MADE IT FOR YOU TO DO YOUR WORK, TAKE CARE OF THINGS AT HOME, OR GET ALONG WITH OTHER PEOPLE: VERY DIFFICULT
5. BEING SO RESTLESS THAT IT IS HARD TO SIT STILL: NOT AT ALL
1. FEELING NERVOUS, ANXIOUS, OR ON EDGE: NEARLY EVERY DAY
3. WORRYING TOO MUCH ABOUT DIFFERENT THINGS: NEARLY EVERY DAY
GAD7 TOTAL SCORE: 10
6. BECOMING EASILY ANNOYED OR IRRITABLE: SEVERAL DAYS
7. FEELING AFRAID AS IF SOMETHING AWFUL MIGHT HAPPEN: NOT AT ALL

## 2020-05-20 ASSESSMENT — PATIENT HEALTH QUESTIONNAIRE - PHQ9
5. POOR APPETITE OR OVEREATING: NOT AT ALL
SUM OF ALL RESPONSES TO PHQ QUESTIONS 1-9: 9

## 2020-05-21 ASSESSMENT — ANXIETY QUESTIONNAIRES: GAD7 TOTAL SCORE: 10

## 2020-06-16 ENCOUNTER — OFFICE VISIT (OUTPATIENT)
Dept: FAMILY MEDICINE | Facility: CLINIC | Age: 45
End: 2020-06-16
Payer: COMMERCIAL

## 2020-06-16 VITALS
BODY MASS INDEX: 26.43 KG/M2 | HEIGHT: 64 IN | OXYGEN SATURATION: 100 % | TEMPERATURE: 97.1 F | DIASTOLIC BLOOD PRESSURE: 60 MMHG | RESPIRATION RATE: 16 BRPM | SYSTOLIC BLOOD PRESSURE: 110 MMHG | HEART RATE: 80 BPM | WEIGHT: 154.8 LBS

## 2020-06-16 DIAGNOSIS — H60.502 ACUTE OTITIS EXTERNA OF LEFT EAR, UNSPECIFIED TYPE: Primary | ICD-10-CM

## 2020-06-16 DIAGNOSIS — H91.90 HEARING DISORDER, UNSPECIFIED LATERALITY: ICD-10-CM

## 2020-06-16 PROCEDURE — 99213 OFFICE O/P EST LOW 20 MIN: CPT | Performed by: PHYSICIAN ASSISTANT

## 2020-06-16 RX ORDER — PREDNISONE 10 MG/1
10 TABLET ORAL DAILY
Qty: 5 TABLET | Refills: 0 | Status: SHIPPED | OUTPATIENT
Start: 2020-06-16 | End: 2020-06-21

## 2020-06-16 RX ORDER — NEOMYCIN SULFATE, POLYMYXIN B SULFATE AND HYDROCORTISONE 10; 3.5; 1 MG/ML; MG/ML; [USP'U]/ML
3 SUSPENSION/ DROPS AURICULAR (OTIC) 4 TIMES DAILY
Qty: 3 ML | Refills: 0 | Status: SHIPPED | OUTPATIENT
Start: 2020-06-16 | End: 2020-06-16

## 2020-06-16 RX ORDER — NEOMYCIN SULFATE, POLYMYXIN B SULFATE, HYDROCORTISONE 3.5; 10000; 1 MG/ML; [USP'U]/ML; MG/ML
3 SOLUTION/ DROPS AURICULAR (OTIC) 4 TIMES DAILY
Qty: 3 ML | Refills: 0 | Status: SHIPPED | OUTPATIENT
Start: 2020-06-16 | End: 2020-07-15

## 2020-06-16 RX ORDER — BUPRENORPHINE AND NALOXONE 2; .5 MG/1; MG/1
FILM, SOLUBLE BUCCAL; SUBLINGUAL
COMMUNITY
End: 2021-07-06

## 2020-06-16 ASSESSMENT — MIFFLIN-ST. JEOR: SCORE: 1331.14

## 2020-06-16 ASSESSMENT — PAIN SCALES - GENERAL: PAINLEVEL: SEVERE PAIN (7)

## 2020-06-16 NOTE — PROGRESS NOTES
"Subjective     Radha Beckwith is a 44 year old female who presents to clinic today for the following health issues:    HPI   Acute Illness   Acute illness concerns: bilateral ear pain  Onset: 2 days    Fever: no    Chills/Sweats: no    Headache (location?): YES    Sinus Pressure:YES    Conjunctivitis:  no    Ear Pain: YES: both    Rhinorrhea: no    Congestion: no    Sore Throat: YES     Cough: YES-productive of clear sputum    Wheeze: no    Decreased Appetite: YES    Nausea: YES    Vomiting: no    Diarrhea:  no    Dysuria/Freq.: no    Fatigue/Achiness: YES    Sick/Strep Exposure: no     Therapies Tried and outcome: Tylenol,slight relief; ear drops; no relief    Patient is a 44 year old female who presents today with complaint of pain in the left ear. She estimates that the pain began 2 days ago while she was cleaning her house. She described the pain as intense starting in the left ear and radiating into the neck. She has been using tylenol with some relief, but feels that the pain is getting worse. Patient does wear hearing aids and uses qtips     Reviewed and updated as needed this visit by Provider         Review of Systems   Constitutional, HEENT, cardiovascular, pulmonary, gi and gu systems are negative, except as otherwise noted.      Objective    /60 (BP Location: Left arm, Patient Position: Chair, Cuff Size: Adult Regular)   Pulse 80   Temp 97.1  F (36.2  C) (Temporal)   Resp 16   Ht 1.632 m (5' 4.25\")   Wt 70.2 kg (154 lb 12.8 oz)   LMP  (LMP Unknown)   SpO2 100%   BMI 26.36 kg/m    Body mass index is 26.36 kg/m .  Physical Exam   GENERAL: healthy, alert and no distress  EYES: Eyes grossly normal to inspection, PERRL and conjunctivae and sclerae normal  HENT: R ear canal and TM's normal, Left canal with mild erythema and tragal tenderness, nose and mouth without ulcers or lesions  NECK: tenderness along the ant left cerv lymph, no asymmetry, masses, or scars and thyroid normal to " palpation  RESP: lungs clear to auscultation - no rales, rhonchi or wheezes  CV: regular rate and rhythm, normal S1 S2, no S3 or S4, no murmur, click or rub, no peripheral edema and peripheral pulses strong  MS: no gross musculoskeletal defects noted, no edema    Diagnostic Test Results:  Labs reviewed in Epic        Assessment & Plan     1. Acute otitis externa of left ear, unspecified type  Short burst of prednisone to help relieve swelling/inflammation. Drops for suspected otitis externa. Educational information sent home with AVS.   - predniSONE (DELTASONE) 10 MG tablet; Take 1 tablet (10 mg) by mouth daily for 5 days  Dispense: 5 tablet; Refill: 0  - neomycin-polymyxin-hydrocortisone (CORTISPORIN) 3.5-64034-9 otic solution; Place 3 drops Into the left ear 4 times daily for 5 days  Dispense: 3 mL; Refill: 0    2. Hearing disorder, unspecified laterality  Patient feels that hearing aids need re-calibration. Would like to repeat hearing test and follow up accordingly.   - AUDIOLOGY ADULT REFERRAL; Future      Return in about 4 months (around 10/16/2020) for Return for scheduled annual checkup with PCP.    Jose Zurita PA-C  [facility]

## 2020-06-16 NOTE — NURSING NOTE
Health Maintenance Due   Topic Date Due     HIV SCREENING  11/15/1988     PNEUMOCOCCAL IMMUNIZATION 19-64 MEDIUM RISK (1 of 1 - PPSV23) 11/15/1992     PREVENTIVE CARE VISIT  07/07/2018     LIPID  02/11/2020     April ERICKSON LPN

## 2020-06-16 NOTE — PATIENT INSTRUCTIONS
Patient Education     External Ear Infection (Adult)    External otitis (also called  swimmer s ear ) is an infection in the ear canal. It is often caused by bacteria or fungus. It can occur a few days after water gets trapped in the ear canal (from swimming or bathing). It can also occur after cleaning too deeply in the ear canal with a cotton swab or other object. Sometimes, hair care products get into the ear canal and cause this problem.  Symptoms can include pain, fever, itching, redness, drainage, or swelling of the ear canal. Temporary hearing loss may also occur.  Home care    Do not try to clean the ear canal. This can push pus and bacteria deeper into the canal.    Use prescribed ear drops as directed. These help reduce swelling and fight the infection. If an ear wick was placed in the ear canal, apply drops right onto the end of the wick. The wick will draw the medicine into the ear canal even if it is swollen closed.    A cotton ball may be loosely placed in the outer ear to absorb any drainage.    You may use acetaminophen or ibuprofen to control pain, unless another medicine was prescribed. Note: If you have chronic liver or kidney disease or ever had a stomach ulcer or GI bleeding, talk to your healthcare provider before taking any of these medicines.    Do not allow water to get into your ear when bathing. Also, don't swim until the infection has cleared.  Prevention    Keep your ears dry. This helps lower the risk of infection. Dry your ears with a towel or hair dryer after getting wet. Also, use ear plugs when swimming.    Do not stick any objects in the ear to remove wax.    If you feel water trapped in your ear, use ear drops right away. You can get these drops over the counter at most drugstores. They work by removing water from the ear canal.  Follow-up care  Follow up with your healthcare provider in 1 week, or as advised.  When to seek medical advice  Call your healthcare provider right  away if any of these occur:    Ear pain becomes worse or doesn t improve after 3 days of treatment    Redness or swelling of the outer ear occurs or gets worse    Headache    Painful or stiff neck    Drowsiness or confusion    Fever of 100.4 F (38 C) or higher, or as directed by your healthcare provider    Seizure  Date Last Reviewed: 10/1/2017    8485-7769 [employer]. [address] [place] [address]. All rights reserved. This information is not intended as a substitute for professional medical care. Always follow your healthcare professional's instructions.

## 2020-06-18 DIAGNOSIS — F41.0 PANIC ATTACKS: ICD-10-CM

## 2020-06-18 RX ORDER — ALPRAZOLAM 0.5 MG
0.5 TABLET ORAL 2 TIMES DAILY PRN
Qty: 60 TABLET | Refills: 0 | Status: SHIPPED | OUTPATIENT
Start: 2020-06-19 | End: 2020-07-15

## 2020-06-18 NOTE — TELEPHONE ENCOUNTER
Pending Prescriptions:                       Disp   Refills    ALPRAZolam (XANAX) 0.5 MG tablet [Pharmacy*60 tab*0        Sig: TAKE ONE TABLET BY MOUTH TWICE A DAY AS NEEDED FOR           PANIC ATTACK    Last Written Prescription Date:  5/20/20  Last Fill Quantity: 60,  # refills: 0   Last office visit: Visit date not found with prescribing provider:     Future Office Visit:            Routing refill request to provider for review/approval because:  Drug not on the [facility] refill protocol     Debra Ramon, MSN, RN

## 2020-06-24 DIAGNOSIS — H60.502 ACUTE OTITIS EXTERNA OF LEFT EAR, UNSPECIFIED TYPE: ICD-10-CM

## 2020-06-24 NOTE — TELEPHONE ENCOUNTER
Patient requesting refill of Cortisporin    Left message to call back.    Please try to get more information on the reason for the refill request.    Jahaira Adams RN on 6/24/2020 at 3:46 PM

## 2020-06-24 NOTE — TELEPHONE ENCOUNTER
Radha returns call and message per Jahaira is relayed.  Radha states she just found a whole at the bottom of her tote bag she carries and she believes her ear drops have fallen out.  She is still having symptoms with her outer ear infection and just wanted to finish it up as prescribed.

## 2020-06-26 RX ORDER — NEOMYCIN SULFATE, POLYMYXIN B SULFATE AND HYDROCORTISONE 10; 3.5; 1 MG/ML; MG/ML; [USP'U]/ML
SUSPENSION/ DROPS AURICULAR (OTIC)
Qty: 10 ML | Refills: 0 | Status: SHIPPED | OUTPATIENT
Start: 2020-06-26 | End: 2021-09-28

## 2020-07-09 DIAGNOSIS — H60.502 ACUTE OTITIS EXTERNA OF LEFT EAR, UNSPECIFIED TYPE: ICD-10-CM

## 2020-07-09 NOTE — TELEPHONE ENCOUNTER
Routing refill request to provider for review/approval because:  Drug not on the [facility] refill protocol     CASTILLO Styles, RN  [facility]

## 2020-07-10 RX ORDER — NEOMYCIN SULFATE, POLYMYXIN B SULFATE AND HYDROCORTISONE 10; 3.5; 1 MG/ML; MG/ML; [USP'U]/ML
SUSPENSION/ DROPS AURICULAR (OTIC)
Qty: 10 ML | Refills: 0 | OUTPATIENT
Start: 2020-07-10

## 2020-07-10 NOTE — TELEPHONE ENCOUNTER
Please call patient to inquire as to whether the refill request for ear drops was in error. If not, please have her update on what her symptoms are.    Jose Zurita PA-C on 7/10/2020 at 10:28 AM

## 2020-07-10 NOTE — TELEPHONE ENCOUNTER
Called and LM for patient to call back. Please relay message below and gather more information.     Jaycee Holliday MA

## 2020-07-13 NOTE — TELEPHONE ENCOUNTER
Patients states that her ear is still bothering her and she is out of the prednisone and almost out of the drops. States she used her hearing aid yesterday and the ear was still irritated. Wondering if there is a different drop you could try using to see if that helps.     Jaycee Holliday MA

## 2020-07-13 NOTE — TELEPHONE ENCOUNTER
Please call patient to inform her that I have sent out an alternative drop for her to use daily as needed.    Jose Zurita PA-C on 7/13/2020 at 2:12 PM

## 2020-07-14 ENCOUNTER — TELEPHONE (OUTPATIENT)
Dept: FAMILY MEDICINE | Facility: OTHER | Age: 45
End: 2020-07-14

## 2020-07-14 DIAGNOSIS — E78.2 MIXED HYPERLIPIDEMIA: ICD-10-CM

## 2020-07-14 DIAGNOSIS — F41.0 PANIC ATTACKS: ICD-10-CM

## 2020-07-15 ENCOUNTER — VIRTUAL VISIT (OUTPATIENT)
Dept: FAMILY MEDICINE | Facility: OTHER | Age: 45
End: 2020-07-15
Payer: COMMERCIAL

## 2020-07-15 DIAGNOSIS — M54.2 CHRONIC NECK PAIN: ICD-10-CM

## 2020-07-15 DIAGNOSIS — F41.0 PANIC ATTACKS: ICD-10-CM

## 2020-07-15 DIAGNOSIS — E78.2 MIXED HYPERLIPIDEMIA: ICD-10-CM

## 2020-07-15 DIAGNOSIS — F33.1 MODERATE EPISODE OF RECURRENT MAJOR DEPRESSIVE DISORDER (H): ICD-10-CM

## 2020-07-15 DIAGNOSIS — G89.29 CHRONIC NECK PAIN: ICD-10-CM

## 2020-07-15 DIAGNOSIS — F41.9 ANXIETY: Primary | ICD-10-CM

## 2020-07-15 PROCEDURE — 99214 OFFICE O/P EST MOD 30 MIN: CPT | Mod: 95 | Performed by: STUDENT IN AN ORGANIZED HEALTH CARE EDUCATION/TRAINING PROGRAM

## 2020-07-15 RX ORDER — CYCLOBENZAPRINE HCL 5 MG
TABLET ORAL
Qty: 60 TABLET | Refills: 3 | Status: SHIPPED | OUTPATIENT
Start: 2020-07-15 | End: 2021-03-02

## 2020-07-15 RX ORDER — SIMVASTATIN 10 MG
TABLET ORAL
Qty: 90 TABLET | Refills: 0 | Status: SHIPPED | OUTPATIENT
Start: 2020-07-15 | End: 2021-03-02

## 2020-07-15 RX ORDER — BUPROPION HYDROCHLORIDE 150 MG/1
150 TABLET ORAL EVERY MORNING
Qty: 90 TABLET | Refills: 1 | Status: SHIPPED | OUTPATIENT
Start: 2020-07-15 | End: 2020-12-27

## 2020-07-15 RX ORDER — ALPRAZOLAM 0.5 MG
0.5 TABLET ORAL 2 TIMES DAILY PRN
Qty: 60 TABLET | Refills: 0 | Status: SHIPPED | OUTPATIENT
Start: 2020-07-15 | End: 2020-08-17

## 2020-07-15 RX ORDER — ALPRAZOLAM 0.5 MG
TABLET ORAL
Qty: 60 TABLET | Refills: 0 | OUTPATIENT
Start: 2020-07-15

## 2020-07-15 ASSESSMENT — ANXIETY QUESTIONNAIRES
6. BECOMING EASILY ANNOYED OR IRRITABLE: NEARLY EVERY DAY
5. BEING SO RESTLESS THAT IT IS HARD TO SIT STILL: SEVERAL DAYS
7. FEELING AFRAID AS IF SOMETHING AWFUL MIGHT HAPPEN: MORE THAN HALF THE DAYS
GAD7 TOTAL SCORE: 15
3. WORRYING TOO MUCH ABOUT DIFFERENT THINGS: NEARLY EVERY DAY
IF YOU CHECKED OFF ANY PROBLEMS ON THIS QUESTIONNAIRE, HOW DIFFICULT HAVE THESE PROBLEMS MADE IT FOR YOU TO DO YOUR WORK, TAKE CARE OF THINGS AT HOME, OR GET ALONG WITH OTHER PEOPLE: VERY DIFFICULT
2. NOT BEING ABLE TO STOP OR CONTROL WORRYING: MORE THAN HALF THE DAYS
1. FEELING NERVOUS, ANXIOUS, OR ON EDGE: NEARLY EVERY DAY

## 2020-07-15 ASSESSMENT — PATIENT HEALTH QUESTIONNAIRE - PHQ9
SUM OF ALL RESPONSES TO PHQ QUESTIONS 1-9: 14
5. POOR APPETITE OR OVEREATING: SEVERAL DAYS

## 2020-07-15 NOTE — TELEPHONE ENCOUNTER
Patient needs fasting labs for cholesterol and visit to recheck anxiety and mood.   Please call.  Thanks,  Marisa Story, CNP

## 2020-07-15 NOTE — TELEPHONE ENCOUNTER
Pending Prescriptions:                       Disp   Refills    ALPRAZolam (XANAX) 0.5 MG tablet [Pharmac*60 tab*0            Sig: TAKE ONE TABLET BY MOUTH TWICE A DAY AS NEEDED           FOR PANIC ATTACKS. USE SPARINGLY.    Last Written Prescription Date:  6/19/20  Last Fill Quantity: 60,  # refills: 0   Last office visit: Visit date not found with prescribing provider:     Future Office Visit:        simvastatin (ZOCOR) 10 MG tablet [Pharmac*90 tab*0            Sig: TAKE ONE TABLET BY MOUTH EVERY NIGHT AT BEDTIME    Routing refill request to provider for review/approval because:  Labs not current:  LDL    Debra Ramon, MSN, RN

## 2020-07-15 NOTE — PROGRESS NOTES
"Radha Beckwith is a 44 year old female who is being evaluated via a billable video visit.      The patient has been notified of following:     \"This video visit will be conducted via a call between you and your physician/provider. We have found that certain health care needs can be provided without the need for an in-person physical exam.  This service lets us provide the care you need with a video conversation.  If a prescription is necessary we can send it directly to your pharmacy.  If lab work is needed we can place an order for that and you can then stop by our lab to have the test done at a later time.    Video visits are billed at different rates depending on your insurance coverage.  Please reach out to your insurance provider with any questions.    If during the course of the call the physician/provider feels a video visit is not appropriate, you will not be charged for this service.\"    Patient has given verbal consent for Video visit? Yes  How would you like to obtain your AVS? MyChart  If you are dropped from the video visit, the video invite should be resent to: Text to cell phone: [phone]  Will anyone else be joining your video visit? No      Subjective     Radha Beckwith is a 44 year old female who presents today via video visit for the following health issues:    HPI    Depression and Anxiety Follow-Up    How are you doing with your depression since your last visit? Worsened     How are you doing with your anxiety since your last visit?  Worsened     Are you having other symptoms that might be associated with depression or anxiety? Yes:  more frequent panic attacks, general feeling of being down    Have you had a significant life event? OTHER: lots going on in personal life      Do you have any concerns with your use of alcohol or other drugs? No  Her house hasn't sold after a few months on the market. She is 3 months behind on house payments. She moved in with her boyfriend and this " is going pretty good. Her son's sister who he met when he as 12 recently  from suicide. She was 16. They were very close. Her best friend's mom . Her son brought home chickens when he was home from the  for his sister's  and now she is caring for the chickens as he is back at his base. He goes to a different base next in an area where it is more dangerous and this causes her anxiety. He is the gunner on top of the tanks so he has a high risk job. She still is grieving her mom's death. She has been taking alprazolam twice daily at the lower dose and it is helpful. She has high anxiety when she gets up in the morning and when she gets home from work. She took buspirone but it caused stomach upset so she stopped taking it. Is taking Wellbutrin and is not sure if it is helping but a lot of stress right now so hard for her to know.     Hyperlipidemia   Is on simvastatin. Denies muscle aches as side effect.     Chronic neck pain - uses cyclobenzaprine as needed to help her muscles relax and this is helpful.   Continues on suboxone and sees Dr. Gunter    Social History     Tobacco Use     Smoking status: Current Every Day Smoker     Packs/day: 0.25     Years: 20.00     Pack years: 5.00     Types: Cigarettes     Smokeless tobacco: Never Used     Tobacco comment: 5-6 cigs daily   Substance Use Topics     Alcohol use: Yes     Comment: occasional drinks     Drug use: No     PHQ 2019 2020 7/15/2020   PHQ-9 Total Score 16 9 14   Q9: Thoughts of better off dead/self-harm past 2 weeks Not at all Not at all Not at all     BO-7 SCORE 2019 2020 7/15/2020   Total Score 12 (moderate anxiety) - -   Total Score 12 10 15         Suicide Assessment Five-step Evaluation and Treatment (SAFE-T)          Video Start Time: 5:45 PM    Patient Active Problem List   Diagnosis     GERD (gastroesophageal reflux disease)     Restless legs     Anxiety     Ingrowing nail     Hyperlipidemia LDL goal  <100     Hypokalemia     Atypical chest pain     Tobacco abuse     Anxiety attack     Opioid dependence in remission (H)     Papanicolaou smear of cervix with low grade squamous intraepithelial lesion (LGSIL)     S/P hysterectomy     Psychophysiological insomnia     Chronic bilateral low back pain without sciatica     Moderate major depression (H)     Supraventricular tachycardia (H)     Panic attacks     Situational depression     Past Surgical History:   Procedure Laterality Date     BIOPSY CERVICAL, LOCAL EXCISION, SINGLE/MULTIPLE N/A 8/10/2017    Procedure: BIOPSY CERVICAL, LOCAL EXCISION, SINGLE/MULTIPLE;;  Surgeon: Michael Chandler MD;  Location: [facility]     COLPOSCOPY, BIOPSY, COMBINED N/A 8/10/2017    Procedure: COMBINED COLPOSCOPY, BIOPSY;  Colposcopy with Cervical Biopsies and Endometrial Biopsy, Exam with Ultrasound;  Surgeon: Michael Chandler MD;  Location: [facility]     ESOPHAGOSCOPY, GASTROSCOPY, DUODENOSCOPY (EGD), COMBINED N/A 4/17/2017    Procedure: COMBINED ESOPHAGOSCOPY, GASTROSCOPY, DUODENOSCOPY (EGD);  Surgeon: Ibrahima Esposito MD;  Location: [facility]     EXAM UNDER ANESTHESIA PELVIC N/A 8/10/2017    Procedure: EXAM UNDER ANESTHESIA PELVIC;;  Surgeon: Michael Chandler MD;  Location: [facility]     [facility] UGI ENDOSCOPY, SIMPLE EXAM  01/07/08     HYSTERECTOMY       HYSTERECTOMY, PAP NO LONGER INDICATED       LAPAROSCOPIC CHOLECYSTECTOMY N/A 2/2/2018    Procedure: LAPAROSCOPIC CHOLECYSTECTOMY;  Laparoscopic Cholecystectomy;  Surgeon: Tigre Lowry DO;  Location: [facility]     LAPAROSCOPIC HYSTERECTOMY TOTAL N/A 10/30/2017    Procedure: LAPAROSCOPIC HYSTERECTOMY TOTAL;  LAPAROSCOPIC HYSTERECTOMY TOTAL POSSIBLE SALPINGO-OOPHERECTOMY (BILATERAL);  Surgeon: Michael Chandler MD;  Location: [facility]       Social History     Tobacco Use     Smoking status: Current Every Day Smoker     Packs/day: 0.25     Years: 20.00     Pack years: 5.00     Types: Cigarettes     Smokeless  tobacco: Never Used     Tobacco comment: 5-6 cigs daily   Substance Use Topics     Alcohol use: Yes     Comment: occasional drinks     Family History   Problem Relation Age of Onset     Depression Mother      Respiratory Mother      Chronic Obstructive Pulmonary Disease Mother      Cerebrovascular Disease Father         Brain anyeurism     Adrenal Disorder Other      Chronic Obstructive Pulmonary Disease Other      Breast Cancer Cousin          Current Outpatient Medications   Medication Sig Dispense Refill     ACETAMINOPHEN PO Take 1,000 mg by mouth every 6 hours as needed for pain       ALPRAZolam (XANAX) 0.5 MG tablet Take 1 tablet (0.5 mg) by mouth 2 times daily as needed (panic attacks. use sparingly.) 60 tablet 0     buprenorphine HCl-naloxone HCl (SUBOXONE) 2-0.5 MG per film 0.5 film daily       buPROPion (WELLBUTRIN XL) 150 MG 24 hr tablet Take 1 tablet (150 mg) by mouth every morning 90 tablet 1     carbamide peroxide (DEBROX) 6.5 % otic solution Place 5 drops into both ears daily as needed for other (wax/discomfort) 14.8 mL 3     cetirizine (ZYRTEC) 10 MG tablet TAKE ONE TABLET BY MOUTH EVERY MORNING 30 tablet 3     cyclobenzaprine (FLEXERIL) 5 MG tablet TAKE 1-2 TABLETS BY MOUTH NIGHTLY AS NEEDED FOR MUSCLE SPASMS 60 tablet 3     famotidine 40 MG PO tablet Take 1 tablet (40 mg) by mouth daily 30 tablet 11     metoclopramide (REGLAN) 10 MG tablet TAKE ONE TABLET BY MOUTH THREE TIMES A DAY AS NEEDED 90 tablet 0     neomycin-polymyxin-hydrocortisone (CORTISPORIN) 3.5-99847-2 otic suspension PLACE 3 DROPS INTO THE LEFT EAR FOUR TIMES A DAY FOR 5 DAYS 10 mL 0     omeprazole (PRILOSEC) 40 MG DR capsule TAKE ONE CAPSULE BY MOUTH ONCE DAILY 30 capsule 3     ondansetron (ZOFRAN-ODT) 4 MG ODT tab DISSOLVE ONE TABLET ON TONGUE EVERY 6 HOURS AS NEEDED FOR NAUSEA 20 tablet 1     order for DME Blood pressure cuff 1 each 0     propranolol (INDERAL) 20 MG tablet Take 1 tablet (20 mg) by mouth 2 times daily 180 tablet 3      QUEtiapine (SEROQUEL) 200 MG tablet TAKE ONE TABLET BY MOUTH EVERY NIGHT AT BEDTIME 90 tablet 1     rOPINIRole (REQUIP) 0.5 MG tablet TAKE ONE TABLET BY MOUTH EVERY NIGHT AT BEDTIME 90 tablet 1     simvastatin (ZOCOR) 10 MG tablet TAKE ONE TABLET BY MOUTH EVERY NIGHT AT BEDTIME 90 tablet 0     zolpidem (AMBIEN) 5 MG tablet TAKE ONE TABLET BY MOUTH NIGHTLY AS NEEDED FOR SLEEP 30 tablet 5     Allergies   Allergen Reactions     Cafergot      12-            GI problems-     Seasonal Allergies      Sumatriptan      vomits after giving herself a shot     Compazine Anxiety     Droperidol Anxiety     Nubain [Nalbuphine Hcl] Anxiety     Prochlorperazine Palpitations     Uncontrolled movement     Recent Labs   Lab Test 11/24/19  2320 04/03/19  1334 02/11/19  1344 11/30/18  1439  08/10/17  1228   A1C 5.0  --   --   --   --   --    LDL  --   --  Cannot estimate LDL when triglyceride exceeds 400 mg/dL  126*  --   --  Cannot estimate LDL when triglyceride exceeds 400 mg/dL  164*   HDL  --   --  22*  --   --  30*   TRIG  --   --  409*  --   --  608*   ALT 30 34  --  30   < > 35   CR 0.76 0.74  --  0.67   < > 0.67   GFRESTIMATED >90 >90  --  >90   < > >90  Non  GFR Calc     GFRESTBLACK >90 >90  --  >90   < > >90  African American GFR Calc     POTASSIUM 3.5 4.7  --  4.2   < > 4.1   TSH  --  1.91  --  1.88  --  1.01    < > = values in this interval not displayed.      BP Readings from Last 3 Encounters:   06/16/20 110/60   12/23/19 108/66   12/18/19 92/74    Wt Readings from Last 3 Encounters:   06/16/20 70.2 kg (154 lb 12.8 oz)   12/23/19 75.7 kg (166 lb 12.8 oz)   12/18/19 77.1 kg (170 lb)                    Reviewed and updated as needed this visit by Provider         Review of Systems   Constitutional, HEENT, cardiovascular, pulmonary, gi and gu systems are negative, except as otherwise noted.      Objective             Physical Exam     GENERAL: alert and no distress  EYES: Eyes grossly normal to  inspection.  No discharge or erythema, or obvious scleral/conjunctival abnormalities.  RESP: No audible wheeze, cough, or visible cyanosis.  No visible retractions or increased work of breathing.    SKIN: Visible skin clear. No significant rash, abnormal pigmentation or lesions.  NEURO: Cranial nerves grossly intact.  Mentation and speech appropriate for age.  PSYCH: mentation appears normal, anxious, judgement and insight intact and appearance well groomed      Diagnostic Test Results:  Labs reviewed in Epic        Assessment & Plan     1. Anxiety  2. Moderate episode of recurrent major depressive disorder (H)  Worsened due to situational stressors. Strongly encouraged her to start counseling and she has on her list of to-dos to get on waiting list with Ricarda Bhatia. Continue current medication.   - buPROPion (WELLBUTRIN XL) 150 MG 24 hr tablet; Take 1 tablet (150 mg) by mouth every morning  Dispense: 90 tablet; Refill: 1    3. Panic attacks  Had buspirone prescription but had side effects of GI upset. Discussed again that chronic daily use of alprazolam is not recommended. She has decreased the dose from 1 mg to 0.5 mg which is a good step. Strongly encouraged counseling as we discussed that stressors with life tend to come up repeatedly and finding ways to cope by working with a counselor would be best.   - ALPRAZolam (XANAX) 0.5 MG tablet; Take 1 tablet (0.5 mg) by mouth 2 times daily as needed (panic attacks. use sparingly.)  Dispense: 60 tablet; Refill: 0    4. Chronic neck pain  No change, muscle relaxer is helpful.   - cyclobenzaprine (FLEXERIL) 5 MG tablet; TAKE 1-2 TABLETS BY MOUTH NIGHTLY AS NEEDED FOR MUSCLE SPASMS  Dispense: 60 tablet; Refill: 3     5. Mixed hyperlipidemia  She will have fasting labs done in the next week or so. Tolerating statin without side effects. Will adjust dosing if needed based on results.     Tobacco Cessation:   reports that she has been smoking cigarettes. She has a 5.00  "pack-year smoking history. She has never used smokeless tobacco.  Tobacco Cessation Action Plan: Information offered: Patient not interested at this time      BMI:   Estimated body mass index is 26.36 kg/m  as calculated from the following:    Height as of 6/16/20: 1.632 m (5' 4.25\").    Weight as of 6/16/20: 70.2 kg (154 lb 12.8 oz).   Weight management plan: Discussed healthy diet and exercise guidelines      No follow-ups on file.    JEANNIE Cano CNP  [facility]      Video-Visit Details    Type of service:  Video Visit    Video End Time: 6:10 pm    Originating Location (pt. Location): Home    Distant Location (provider location):  [facility]     Platform used for Video Visit: [employer]    No follow-ups on file.       JEANNIE Cano CNP      "

## 2020-07-16 ASSESSMENT — ANXIETY QUESTIONNAIRES: GAD7 TOTAL SCORE: 15

## 2020-08-10 DIAGNOSIS — F51.04 PSYCHOPHYSIOLOGICAL INSOMNIA: ICD-10-CM

## 2020-08-10 DIAGNOSIS — J30.2 SEASONAL ALLERGIC RHINITIS, UNSPECIFIED TRIGGER: ICD-10-CM

## 2020-08-10 NOTE — PROGRESS NOTES
ALYSAD looked good.    Have a nice day!    Dr. Chino     

## 2020-08-11 RX ORDER — CETIRIZINE HYDROCHLORIDE 10 MG/1
TABLET ORAL
Qty: 90 TABLET | Refills: 2 | Status: SHIPPED | OUTPATIENT
Start: 2020-08-11 | End: 2021-03-02

## 2020-08-12 RX ORDER — ZOLPIDEM TARTRATE 5 MG/1
TABLET ORAL
Qty: 30 TABLET | Refills: 5 | Status: SHIPPED | OUTPATIENT
Start: 2020-08-12 | End: 2021-02-12

## 2020-08-12 NOTE — TELEPHONE ENCOUNTER
Pending Prescriptions:                       Disp   Refills    cetirizine (ZYRTEC) 10 MG tablet [Pharmac*30 tab*3            Sig: TAKE ONE TABLET BY MOUTH EVERY MORNING  Prescription approved per [facility] Refill Protocol.      zolpidem (AMBIEN) 5 MG tablet [Pharmacy M*30 tab*5            Sig: TAKE ONE TABLET BY MOUTH NIGHTLY AS NEEDED FOR           SLEEP    Routing refill request to provider for review/approval because:  Drug not on the [facility] refill protocol     Sarah Barrientos, DIONYN, RN, PHN

## 2020-08-14 DIAGNOSIS — F41.0 PANIC ATTACKS: ICD-10-CM

## 2020-08-14 NOTE — TELEPHONE ENCOUNTER
Pending Prescriptions:                       Disp   Refills    ALPRAZolam (XANAX) 0.5 MG tablet [Pharmacy*60 tab*0        Sig: TAKE ONE TABLET BY MOUTH TWICE A DAY AS NEEDED FOR           PANIC ATTACKS (USE SPARINGLY)        Routing refill request to provider for review/approval because:  Drug not on the [facility] refill protocol

## 2020-08-17 RX ORDER — ALPRAZOLAM 0.5 MG
TABLET ORAL
Qty: 60 TABLET | Refills: 0 | Status: SHIPPED | OUTPATIENT
Start: 2020-08-17 | End: 2020-09-15

## 2020-10-14 DIAGNOSIS — F41.9 ANXIETY: ICD-10-CM

## 2020-10-14 DIAGNOSIS — F41.0 PANIC ATTACKS: ICD-10-CM

## 2020-10-14 DIAGNOSIS — R00.0 SINUS TACHYCARDIA: ICD-10-CM

## 2020-10-14 DIAGNOSIS — R11.0 NAUSEA: ICD-10-CM

## 2020-10-15 RX ORDER — ONDANSETRON 4 MG/1
TABLET, ORALLY DISINTEGRATING ORAL
Qty: 20 TABLET | Refills: 0 | Status: SHIPPED | OUTPATIENT
Start: 2020-10-15 | End: 2020-11-25

## 2020-10-15 NOTE — TELEPHONE ENCOUNTER
Pending Prescriptions:                       Disp   Refills    ALPRAZolam (XANAX) 0.5 MG tablet [Pharmacy*60 tab*0        Sig: TAKE ONE TABLET BY MOUTH TWO TIMES A DAY AS NEEDED           FOR PANIC ATTACKS(USE SPARINGLY)    propranolol (INDERAL) 20 MG tablet [Pharma*180 ta*3        Sig: TAKE ONE TABLET BY MOUTH TWICE A DAY    Signed Prescriptions:                        Disp   Refills    ondansetron (ZOFRAN-ODT) 4 MG ODT tab      20 tab*0        Sig: DISSOLVE ONE TABLET BY MOUTH EVERY 6 HOURS AS NEEDED           FOR NAUSEA  Authorizing Provider: KOLBY LIN  Ordering User: YAA NAVAS      Routing refill request to provider for review/approval because:  Drug not on the [facility] refill protocol   A break in medication    Yaa Navas RN

## 2020-10-16 RX ORDER — PROPRANOLOL HYDROCHLORIDE 20 MG/1
TABLET ORAL
Qty: 180 TABLET | Refills: 3 | Status: SHIPPED | OUTPATIENT
Start: 2020-10-16 | End: 2021-03-02

## 2020-10-16 RX ORDER — ALPRAZOLAM 0.5 MG
TABLET ORAL
Qty: 60 TABLET | Refills: 0 | Status: SHIPPED | OUTPATIENT
Start: 2020-10-16 | End: 2020-11-16

## 2020-11-13 DIAGNOSIS — F41.0 PANIC ATTACKS: ICD-10-CM

## 2020-11-13 NOTE — TELEPHONE ENCOUNTER
Pending Prescriptions:                       Disp   Refills    ALPRAZolam (XANAX) 0.5 MG tablet [Pharmacy*60 tab*0        Sig: TAKE ONE TABLET BY MOUTH TWO TIMES A DAY AS NEEDED           FOR PANIC ATTACKS(USE SPARINGLY)       Last Written Prescription Date:  10/16/2020  Last Fill Quantity: 60,   # refills: 0  Last Office Visit: 07/15/2020  Future Office visit:           Routing refill request to provider for review/approval because:  Drug not on the [facility] refill protocol     Frida Navas RN

## 2020-11-16 RX ORDER — ALPRAZOLAM 0.5 MG
TABLET ORAL
Qty: 60 TABLET | Refills: 0 | Status: SHIPPED | OUTPATIENT
Start: 2020-11-16 | End: 2020-12-15

## 2020-11-18 ENCOUNTER — VIRTUAL VISIT (OUTPATIENT)
Dept: FAMILY MEDICINE | Facility: CLINIC | Age: 45
End: 2020-11-18
Payer: COMMERCIAL

## 2020-11-18 DIAGNOSIS — J06.9 UPPER RESPIRATORY TRACT INFECTION, UNSPECIFIED TYPE: ICD-10-CM

## 2020-11-18 DIAGNOSIS — Z20.822 SUSPECTED COVID-19 VIRUS INFECTION: Primary | ICD-10-CM

## 2020-11-18 PROCEDURE — 99213 OFFICE O/P EST LOW 20 MIN: CPT | Mod: 95 | Performed by: FAMILY MEDICINE

## 2020-11-18 NOTE — PATIENT INSTRUCTIONS
Patient Education     Pharyngitis (Sore Throat), Report Pending    Pharyngitis (sore throat) is often due to a virus. It can also be caused by streptococcus (strep), bacteria. This is often called strep throat. Both viral and strep infections can cause throat pain that is worse when swallowing, aching all over, headache, and fever. Both types of infections are contagious. They may be spread by coughing, kissing, or touching others after touching your mouth or nose.  A test has been done to find out if you or your child have strep throat. Call this facility or your healthcare provider if you were not given your test results. If the test is positive for strep infection, you will need to take antibiotic medicines. A prescription can be called into your pharmacy at that time. If the test is negative, you probably have a viral pharyngitis. This does not need to be treated with antibiotics. Until you receive the results of the strep test, you should stay home from work. If your child is being tested, he or she should stay home from school.  Home care    Rest at home. Drink plenty of fluids so you won't get dehydrated.    If the test is positive for strep, you or your child should not go to work or school for the first 2 days of taking the antibiotics. After this time, you or your child will not be contagious. You or your child can then return to work or school when feeling better.     Use the antibiotic medicine for the full 10 days. Do not stop the medicine even if you or your child feel better. This is very important to make sure the infection is fully treated. It is also important to prevent medicine-resistant germs from growing. If you or your child were given an antibiotic shot, no more antibiotics are needed.    Use throat lozenges or numbing throat sprays to help reduce pain. Gargling with warm salt water will also help reduce throat pain. Dissolve 1/2 teaspoon of salt in 1 glass of warm water. Children can sip  on juice or a popsicle. Children 5 years and older can also suck on a lollipop or hard candy.    Don't eat salty or spicy foods or give them to your child. These can irritate the throat.  Other medicine for a child: You can give your child acetaminophen for fever, fussiness, or discomfort. In babies over 6 months of age, you may use ibuprofen instead of acetaminophen. If your child has chronic liver or kidney disease or ever had a stomach ulcer or GI bleeding, talk with your child s healthcare provider before giving these medicines. Aspirin should never be used by any child under 18 years of age who has a fever. It may cause severe liver damage.  Other medicine for an adult: You may use acetaminophen or ibuprofen to control pain or fever, unless another medicine was prescribed for this. If you have chronic liver or kidney disease or ever had a stomach ulcer or GI bleeding, talk with your healthcare provider before using these medicines.  Follow-up care  Follow up with your healthcare provider or our staff if you or your child don't get better over the next week.  When to seek medical advice  Call your healthcare provider right away if any of these occur:    Fever as directed by your healthcare provider. For children, seek care if:  ? Your child is of any age and has repeated fevers above 104 F (40 C).  ? Your child is younger than 2 years of age and has a fever of 100.4 F (38 C) for more than 1 day.  ? Your child is 2 years old or older and has a fever of 100.4 F (38 C) for more than 3 days.    New or worsening ear pain, sinus pain, or headache    Painful lumps in the back of neck    Stiff neck    Lymph nodes are getting larger     Can t swallow liquids, a lot of drooling, or can t open mouth wide due to throat pain    Signs of dehydration, such as very dark urine or no urine, sunken eyes, dizziness    Trouble breathing or noisy breathing    Muffled voice    New rash    Other symptoms getting worse  Prevention  Here  are steps you can take to help prevent an infection:    Keep good hand washing habits.    Don t have close contact with people who have sore throats, colds, or other upper respiratory infections.    Don t smoke, and stay away from secondhand smoke.    Stay up to date with of your vaccines.  I[employer] last reviewed this educational content on 11/1/2017 2000-2020 [employer]. [address] [place] [address]. All rights reserved. This information is not intended as a substitute for professional medical care. Always follow your healthcare professional's instructions.

## 2020-11-18 NOTE — PROGRESS NOTES
"Radha Beckwith is a 45 year old female who is being evaluated via a billable telephone visit.      The patient has been notified of following:     \"This telephone visit will be conducted via a call between you and your physician/provider. We have found that certain health care needs can be provided without the need for a physical exam.  This service lets us provide the care you need with a short phone conversation.  If a prescription is necessary we can send it directly to your pharmacy.  If lab work is needed we can place an order for that and you can then stop by our lab to have the test done at a later time.    Telephone visits are billed at different rates depending on your insurance coverage. During this emergency period, for some insurers they may be billed the same as an in-person visit.  Please reach out to your insurance provider with any questions.    If during the course of the call the physician/provider feels a telephone visit is not appropriate, you will not be charged for this service.\"    Patient has given verbal consent for Telephone visit?  Yes    What phone number would you like to be contacted at? [phone]    How would you like to obtain your AVS? Irenat    Subjective     Radha Beckwith is a 45 year old female who presents via phone visit today for the following health issues:    HPI     Acute Illness  Acute illness concerns:   Onset/Duration: a few weeks   Symptoms:  Fever: no  Chills/Sweats: no  Headache (location?): no  Sinus Pressure: YES  Conjunctivitis:  no  Ear Pain: no  Rhinorrhea: no  Congestion: no  Sore Throat: YES, really sore and red  Cough: YES - slight  Wheeze: no  Decreased Appetite: no  Nausea: no  Vomiting: no  Diarrhea: YES- the last few days   Dysuria/Freq.: no  Dysuria or Hematuria: no  Fatigue/Achiness: no  Sick/Strep Exposure: no  Therapies tried and outcome: throat spray and tylenol      Patient reports 2 to 3 weeks of ongoing sore throat.  She does have " additional sinus pressure sensation.  She notes her throat appears red but without any evidence of tonsillar exudate.  She is unsure if her lymph nodes or glands are swollen.  She does have a slight cough ongoing.  She has had some diarrhea the last few days.  No other sick contacts at home.  She has tried Tylenol and numbing throat medication without much benefit.    She has no history of mono.  She is unsure if she has any postnasal drip symptoms.  Denies any changes in her voice such.  No difficulty clearing secretions, swallowing, etc.    No other questions or concerns at this time.    Review of Systems   Constitutional, HEENT, lymph, Derm, MSK, cardiovascular, pulmonary, gi and gu systems are negative, except as otherwise noted.       Objective      Vitals:  No vitals were obtained today due to virtual visit.    healthy, alert and no distress  PSYCH: Alert and oriented times 3; coherent speech, normal   rate and volume, able to articulate logical thoughts, able   to abstract reason, no tangential thoughts, no hallucinations   or delusions  Her affect is normal  RESP: No cough, no audible wheezing, able to talk in full sentences  Remainder of exam unable to be completed due to telephone visits    Strep and Covid test pending        Assessment & Plan   1. Suspected COVID-19 virus infection: Given slight cough and prolonged symptoms will check for Covid.  Given pharyngitis will also check for strep as below.  Could also consider mono.  If both test come back negative and symptoms persist should be seen in person for further evaluation.  Patient agreeable plan.  - Symptomatic COVID-19 Virus (Coronavirus) by PCR; Future    2. Upper respiratory tract infection, unspecified type:  - Streptococcus A Rapid Scr w Reflx to PCR; Future     Return in about 1 week (around 11/25/2020), or if symptoms worsen or fail to improve.    Pilo Frost MD  [facility]     This chart is completed  utilizing dictation software; typos and/or incorrect word substitutions may unintentionally occur.      Phone call duration:  7 minutes

## 2020-11-19 ENCOUNTER — ALLIED HEALTH/NURSE VISIT (OUTPATIENT)
Dept: FAMILY MEDICINE | Facility: CLINIC | Age: 45
End: 2020-11-19
Payer: COMMERCIAL

## 2020-11-19 ENCOUNTER — TELEPHONE (OUTPATIENT)
Dept: FAMILY MEDICINE | Facility: CLINIC | Age: 45
End: 2020-11-19

## 2020-11-19 DIAGNOSIS — J06.9 UPPER RESPIRATORY TRACT INFECTION, UNSPECIFIED TYPE: ICD-10-CM

## 2020-11-19 LAB
DEPRECATED S PYO AG THROAT QL EIA: NEGATIVE
SPECIMEN SOURCE: NORMAL
SPECIMEN SOURCE: NORMAL
STREP GROUP A PCR: NOT DETECTED

## 2020-11-19 PROCEDURE — 87651 STREP A DNA AMP PROBE: CPT | Performed by: FAMILY MEDICINE

## 2020-11-19 PROCEDURE — 99N1174 PR STATISTIC STREP A RAPID: Performed by: FAMILY MEDICINE

## 2020-11-19 PROCEDURE — 99207 PR NO CHARGE NURSE ONLY: CPT

## 2020-11-19 NOTE — LETTER
RETURN TO WORK/SCHOOL FORM    11/19/2020    Re: Radha Beckwith  1975      To Whom It May Concern:     Radha Beckwith was seen in clinic on 1/18/20. She is currently experiencing symptoms that could be consistent with COVID. We are awaiting test results and will advise her on returning to work when these results return in the next few days.         Pilo Frost MD  11/19/2020 4:03 PM

## 2020-11-19 NOTE — TELEPHONE ENCOUNTER
Pt is wondering id she could get a letter for her work dues to her not feeling well and is awaiting results for her strep test.        Tiny Tellez CMA

## 2020-11-19 NOTE — TELEPHONE ENCOUNTER
Please call and advise patient her strep test is negative, but will give her a work note for until her covid test returns. Work note written please fax/email.    Thank you,    Pilo Frost MD  [facility]

## 2020-11-19 NOTE — TELEPHONE ENCOUNTER
Please advise below.   Patient had virtual visit with you yesterday.   Wandy Stewart RN  November 19, 2020

## 2020-11-20 NOTE — TELEPHONE ENCOUNTER
Patient does not need a note at this time. Her test is on Monday and she will reach out if she does need anything -she is testing in [place] and is not sure who will give her the results.    Malathi Schaeffer MA on 11/20/2020 at 3:01 PM

## 2020-11-23 DIAGNOSIS — Z20.822 SUSPECTED COVID-19 VIRUS INFECTION: ICD-10-CM

## 2020-11-23 PROCEDURE — U0003 INFECTIOUS AGENT DETECTION BY NUCLEIC ACID (DNA OR RNA); SEVERE ACUTE RESPIRATORY SYNDROME CORONAVIRUS 2 (SARS-COV-2) (CORONAVIRUS DISEASE [COVID-19]), AMPLIFIED PROBE TECHNIQUE, MAKING USE OF HIGH THROUGHPUT TECHNOLOGIES AS DESCRIBED BY CMS-2020-01-R: HCPCS | Performed by: FAMILY MEDICINE

## 2020-11-24 DIAGNOSIS — F41.9 ANXIETY: ICD-10-CM

## 2020-11-24 DIAGNOSIS — G25.81 RESTLESS LEGS SYNDROME (RLS): ICD-10-CM

## 2020-11-24 DIAGNOSIS — R11.0 NAUSEA: ICD-10-CM

## 2020-11-24 LAB
SARS-COV-2 RNA SPEC QL NAA+PROBE: NOT DETECTED
SPECIMEN SOURCE: NORMAL

## 2020-11-25 ENCOUNTER — TELEPHONE (OUTPATIENT)
Dept: FAMILY MEDICINE | Facility: OTHER | Age: 45
End: 2020-11-25

## 2020-11-25 RX ORDER — ROPINIROLE 0.5 MG/1
TABLET, FILM COATED ORAL
Qty: 90 TABLET | Refills: 0 | Status: SHIPPED | OUTPATIENT
Start: 2020-11-25 | End: 2021-03-02

## 2020-11-25 RX ORDER — ONDANSETRON 4 MG/1
TABLET, ORALLY DISINTEGRATING ORAL
Qty: 20 TABLET | Refills: 0 | Status: SHIPPED | OUTPATIENT
Start: 2020-11-25 | End: 2021-01-14

## 2020-11-25 NOTE — PROGRESS NOTES
Subjective     Radha Beckwith is a 45 year old female who presents to clinic today for the following health issues:    HPI       She has had a sore throat for the past month. She was tested for strep and Covid and both were negative. She has had some sinus pressure but not like the pressure she has had with past sinus infections. She takes allergy medication and uses Flonase daily year round.     She has a tender lymph node on the left side of her neck that has been tender for the past 4 weeks. She has used numbing spray for her throat and it burns initially but then it starts to numb it up. She can't suck on lozenges because she burned her tongue as a child from sucking on lozenges when she was sick and her tongue has been sensitive since.   No fever or chills.          Review of Systems   Constitutional, HEENT, cardiovascular, pulmonary, gi and gu systems are negative, except as otherwise noted.      Objective    Pulse 78   Temp 98  F (36.7  C)   Resp 16   LMP  (LMP Unknown)   SpO2 97%   There is no height or weight on file to calculate BMI.  Physical Exam   GENERAL APPEARANCE: alert and no distress  HENT: ear canals and TM's normal, oral mucous membranes moist, tonsillar erythema and left anterior cervical lymph nodes tender  RESP: lungs clear to auscultation - no rales, rhonchi or wheezes  CV: regular rates and rhythm, normal S1 S2, no S3 or S4 and no murmur, click or rub  SKIN: no suspicious lesions or rashes  PSYCH: mentation appears normal and affect normal/bright    Results for orders placed or performed in visit on 12/01/20 (from the past 24 hour(s))   Mononucleosis screen   Result Value Ref Range    Mononucleosis Screen Negative NEG^Negative           Assessment & Plan     Acute pharyngitis, unspecified etiology  Mono negative. Will treat ongoing throat pain with lymphadenopathy with Augmentin and if the symptoms persist or worsen, I recommend she see ENT for further evaluation.   - Mononucleosis  screen  - amoxicillin-clavulanate (AUGMENTIN) 875-125 MG tablet; Take 1 tablet by mouth 2 times daily for 10 days    Lymphadenopathy  - Mononucleosis screen  - amoxicillin-clavulanate (AUGMENTIN) 875-125 MG tablet; Take 1 tablet by mouth 2 times daily for 10 days  - OTOLARYNGOLOGY REFERRAL    Screen for STD (sexually transmitted disease)  - HIV Antigen Antibody Combo  - Treponema Abs w Reflex to RPR and Titer  - CHLAMYDIA TRACHOMATIS PCR  - NEISSERIA GONORRHOEA PCR        No follow-ups on file.    JEANNIE Cano CNP  [facility]

## 2020-11-25 NOTE — TELEPHONE ENCOUNTER
Reason for Call:  Same Day Appointment, Requested Provider:  any    PCP: Marisa Story    Reason for visit: red swollen sore throat/  Had strep test last week in [place] which was negative also had negative covid test Monday. Was told she should come in and be tested for mono and have someone look at her throat    Duration of symptoms:     Have you been treated for this in the past? Yes    Additional comments: is wondering if anyone would work her in today in [facility] or [place]. Declined virtual    Can we leave a detailed message on this number? YES    Phone number patient can be reached at: Home number on file [phone] (home)    Best Time: any    Call taken on 11/25/2020 at 11:03 AM by Kim Clemente

## 2020-11-25 NOTE — TELEPHONE ENCOUNTER
Called to discuss further with the patient. Informed her that there are no openings at any of the locations that are in person. Patient scheduled for next Tuesday. She will monitor her symptoms and if they get too bad will be seen at .   Wandy Stewart RN  November 25, 2020

## 2020-11-29 ENCOUNTER — HEALTH MAINTENANCE LETTER (OUTPATIENT)
Age: 45
End: 2020-11-29

## 2020-12-01 ENCOUNTER — OFFICE VISIT (OUTPATIENT)
Dept: FAMILY MEDICINE | Facility: OTHER | Age: 45
End: 2020-12-01
Payer: COMMERCIAL

## 2020-12-01 VITALS — TEMPERATURE: 98 F | OXYGEN SATURATION: 97 % | RESPIRATION RATE: 16 BRPM | HEART RATE: 78 BPM

## 2020-12-01 DIAGNOSIS — J02.9 ACUTE PHARYNGITIS, UNSPECIFIED ETIOLOGY: Primary | ICD-10-CM

## 2020-12-01 DIAGNOSIS — R59.1 LYMPHADENOPATHY: ICD-10-CM

## 2020-12-01 DIAGNOSIS — Z11.3 SCREEN FOR STD (SEXUALLY TRANSMITTED DISEASE): ICD-10-CM

## 2020-12-01 LAB — HETEROPH AB SER QL: NEGATIVE

## 2020-12-01 PROCEDURE — 87389 HIV-1 AG W/HIV-1&-2 AB AG IA: CPT | Performed by: STUDENT IN AN ORGANIZED HEALTH CARE EDUCATION/TRAINING PROGRAM

## 2020-12-01 PROCEDURE — 87491 CHLMYD TRACH DNA AMP PROBE: CPT | Performed by: STUDENT IN AN ORGANIZED HEALTH CARE EDUCATION/TRAINING PROGRAM

## 2020-12-01 PROCEDURE — 87591 N.GONORRHOEAE DNA AMP PROB: CPT | Performed by: STUDENT IN AN ORGANIZED HEALTH CARE EDUCATION/TRAINING PROGRAM

## 2020-12-01 PROCEDURE — 86308 HETEROPHILE ANTIBODY SCREEN: CPT | Performed by: STUDENT IN AN ORGANIZED HEALTH CARE EDUCATION/TRAINING PROGRAM

## 2020-12-01 PROCEDURE — 99000 SPECIMEN HANDLING OFFICE-LAB: CPT | Performed by: STUDENT IN AN ORGANIZED HEALTH CARE EDUCATION/TRAINING PROGRAM

## 2020-12-01 PROCEDURE — 86701 HIV-1ANTIBODY: CPT | Mod: 59 | Performed by: STUDENT IN AN ORGANIZED HEALTH CARE EDUCATION/TRAINING PROGRAM

## 2020-12-01 PROCEDURE — 36415 COLL VENOUS BLD VENIPUNCTURE: CPT | Performed by: STUDENT IN AN ORGANIZED HEALTH CARE EDUCATION/TRAINING PROGRAM

## 2020-12-01 PROCEDURE — 86780 TREPONEMA PALLIDUM: CPT | Mod: 90 | Performed by: STUDENT IN AN ORGANIZED HEALTH CARE EDUCATION/TRAINING PROGRAM

## 2020-12-01 PROCEDURE — 86702 HIV-2 ANTIBODY: CPT | Mod: 59 | Performed by: STUDENT IN AN ORGANIZED HEALTH CARE EDUCATION/TRAINING PROGRAM

## 2020-12-01 PROCEDURE — 99213 OFFICE O/P EST LOW 20 MIN: CPT | Performed by: STUDENT IN AN ORGANIZED HEALTH CARE EDUCATION/TRAINING PROGRAM

## 2020-12-02 ENCOUNTER — TELEPHONE (OUTPATIENT)
Dept: FAMILY MEDICINE | Facility: OTHER | Age: 45
End: 2020-12-02

## 2020-12-02 DIAGNOSIS — Z21 POSITIVE LABORATORY TESTING FOR HUMAN IMMUNODEFICIENCY VIRUS (H): Primary | ICD-10-CM

## 2020-12-02 LAB
C TRACH DNA SPEC QL NAA+PROBE: NEGATIVE
HIV 1 & 2 AB SERPL IA.RAPID: ABNORMAL
HIV 1 & 2 AB SERPL IA.RAPID: NONREACTIVE
HIV 1+2 AB+HIV1 P24 AG SERPL QL IA: REACTIVE
HIV 1+2 AB+HIV1P24 AG SERPLBLD IA.RAPID: ABNORMAL
N GONORRHOEA DNA SPEC QL NAA+PROBE: NEGATIVE
SPECIMEN SOURCE: NORMAL
SPECIMEN SOURCE: NORMAL
T PALLIDUM AB SER QL: NONREACTIVE

## 2020-12-02 NOTE — TELEPHONE ENCOUNTER
Ariana, from the [facility] lab calling. Provided writer with abnormal finding. Please review and advise.     Yesterday HIV combo level (indicates any antibodies related to HIV) showed reactive. The confirmatory tests were ran HIV 1 and HIV 2. Which are listed below.   See that provider already discussed first lab. Second lab is back and it is recommended that the patient has a nucleic acid lab test done to determine if the patient has HIV.      Ref Range & Units 1d ago     HIV Antigen Antibody Combo NR^Nonreactive     ReactiveAbnormal        Ref Range & Units 1d ago     HIV 1 Result NR^Nonreactive IndeterminateAbnormal     Comment: CALLED TO WANDY STEWART RN AT [facility] 12   [id number] AT 1256 BY .     HIV 2 Result NR^Nonreactive Nonreactive     HIV Interpretation  Further testing by nucleic acid amplification for the detection of HIV-1 must be   performed.      Wandy Stewart RN  December 2, 2020

## 2020-12-04 DIAGNOSIS — Z21 POSITIVE LABORATORY TESTING FOR HUMAN IMMUNODEFICIENCY VIRUS (H): ICD-10-CM

## 2020-12-04 PROCEDURE — 87536 HIV-1 QUANT&REVRSE TRNSCRPJ: CPT | Performed by: STUDENT IN AN ORGANIZED HEALTH CARE EDUCATION/TRAINING PROGRAM

## 2020-12-04 PROCEDURE — 36415 COLL VENOUS BLD VENIPUNCTURE: CPT | Performed by: STUDENT IN AN ORGANIZED HEALTH CARE EDUCATION/TRAINING PROGRAM

## 2020-12-07 LAB
HIV1 RNA # PLAS NAA DL=20: NORMAL {COPIES}/ML
HIV1 RNA SERPL NAA+PROBE-LOG#: NORMAL {LOG_COPIES}/ML

## 2020-12-14 DIAGNOSIS — F41.0 PANIC ATTACKS: ICD-10-CM

## 2020-12-15 RX ORDER — ALPRAZOLAM 0.5 MG
TABLET ORAL
Qty: 60 TABLET | Refills: 0 | Status: SHIPPED | OUTPATIENT
Start: 2020-12-15 | End: 2021-01-14

## 2020-12-15 NOTE — TELEPHONE ENCOUNTER
Pending Prescriptions:                       Disp   Refills    ALPRAZolam (XANAX) 0.5 MG tablet [Pharmacy*60 tab*0        Sig: TAKE ONE TABLET BY MOUTH TWO TIMES A DAY AS NEEDED           FOR PANIC ATTACKS(USE SPARINGLY)    Routing refill request to provider for review/approval because:  Drug not on the [facility] refill protocol

## 2020-12-16 ENCOUNTER — VIRTUAL VISIT (OUTPATIENT)
Dept: FAMILY MEDICINE | Facility: OTHER | Age: 45
End: 2020-12-16
Payer: COMMERCIAL

## 2020-12-16 DIAGNOSIS — R50.9 FEVER AND CHILLS: ICD-10-CM

## 2020-12-16 DIAGNOSIS — J02.9 ACUTE PHARYNGITIS, UNSPECIFIED ETIOLOGY: ICD-10-CM

## 2020-12-16 DIAGNOSIS — F11.21 OPIOID DEPENDENCE IN REMISSION (H): ICD-10-CM

## 2020-12-16 DIAGNOSIS — M25.552 HIP PAIN, LEFT: ICD-10-CM

## 2020-12-16 DIAGNOSIS — G57.12 MERALGIA PARESTHETICA OF LEFT SIDE: Primary | ICD-10-CM

## 2020-12-16 DIAGNOSIS — M79.18 LEFT BUTTOCK PAIN: ICD-10-CM

## 2020-12-16 PROCEDURE — 99214 OFFICE O/P EST MOD 30 MIN: CPT | Mod: 95 | Performed by: FAMILY MEDICINE

## 2020-12-16 RX ORDER — DULOXETIN HYDROCHLORIDE 20 MG/1
20 CAPSULE, DELAYED RELEASE ORAL 2 TIMES DAILY
Qty: 60 CAPSULE | Refills: 1 | Status: SHIPPED | OUTPATIENT
Start: 2020-12-16 | End: 2021-03-02

## 2020-12-16 RX ORDER — PREDNISONE 20 MG/1
40 TABLET ORAL DAILY
Qty: 10 TABLET | Refills: 0 | Status: SHIPPED | OUTPATIENT
Start: 2020-12-16 | End: 2020-12-21

## 2020-12-16 NOTE — PROGRESS NOTES
"Radha Beckwith is a 45 year old female who is being evaluated via a billable video visit.      The patient has been notified of following:     \"This video visit will be conducted via a call between you and your physician/provider. We have found that certain health care needs can be provided without the need for an in-person physical exam.  This service lets us provide the care you need with a video conversation.  If a prescription is necessary we can send it directly to your pharmacy.  If lab work is needed we can place an order for that and you can then stop by our lab to have the test done at a later time.    Video visits are billed at different rates depending on your insurance coverage.  Please reach out to your insurance provider with any questions.    If during the course of the call the physician/provider feels a video visit is not appropriate, you will not be charged for this service.\"    Patient has given verbal consent for Video visit? Yes  How would you like to obtain your AVS? MyChart  If you are dropped from the video visit, the video invite should be resent to: Text to cell phone:   [phone]   Will anyone else be joining your video visit? No      Subjective     Radha Beckwith is a 45 year old female who presents today via video visit for the following health issues:    HPI        Acute Illness  Acute illness concerns:   Onset/Duration: Today   Symptoms:  Fever: YES. 101 temp.   Chills/Sweats: YES.both.  Headache (location?): YES  Sinus Pressure: no  Conjunctivitis:  no  Ear Pain:   Rhinorrhea: no  Congestion: no  Sore Throat: YES  Cough: YES. Dry cough.  Wheeze: no  Decreased Appetite: Yes.  Nausea: YES.   Vomiting: no  Diarrhea: no  Dysuria/Freq.: no  Dysuria or Hematuria: no  Fatigue/Achiness: YES.both.  Sick/Strep Exposure: no  Therapies tried and outcome:  Zofran, Tylenol , and  Sudafed.        Video Start Time: 1:54 PM    Pt had an appointment today at 8:40.  Her left hip and leg " have been hurting for some time.      Last night and the night before, she woke up several times due to the pain.      Pain is a burning sensation.  She reports it's on the lateral side of her right hip and radiates towards her buttock.  No swelling of her leg.  No numbness/weakness in her leg.  Pain radiates down her leg occasionally.  Does hurt more with palpation.  No rash.  No skin changes.  Feels warm to the touch.      When she got up at 6 AM, noted to have a temperature to 101.6 degrees.  Also with a coughing spell last night.  She wonders if it might have been due to reflux.  Took some tylenol and sudafed this morning.  Temperature improved to 98.5 degrees.  No myalgias, no loss of smell/taste.      Has had a sore throat for some time.  Florecita saw her for this.  Had negative strep/covid swab a couple of weeks ago.      She took some left-over hydrocodone when the pain was bad a few nights ago.  This helped with her symptoms.      Gets GI upset with NSAIDs.    Pt states that gabapentin hasn't ever helped with her pain.      Pt is on suboxone.      Review of Systems   Constitutional, HEENT, cardiovascular, pulmonary, gi and gu systems are negative, except as otherwise noted.      Objective           Vitals:  No vitals were obtained today due to virtual visit.    Physical Exam     GENERAL: Healthy, alert and no distress  EYES: Eyes grossly normal to inspection.  No discharge or erythema, or obvious scleral/conjunctival abnormalities.  RESP: No audible wheeze, cough, or visible cyanosis.  No visible retractions or increased work of breathing.    MS: Patient reports tenderness of her left lateral thigh radiating to her buttock.  She was able to demonstrate this via video and in a drawing that she made.  SKIN: Visible skin clear. No significant rash, abnormal pigmentation or lesions.  NEURO: Cranial nerves grossly intact.  Mentation and speech appropriate for age.  PSYCH: Mentation appears normal, affect  normal/bright, judgement and insight intact, normal speech and appearance well-groomed.      No results found for any visits on 12/16/20.        Assessment & Plan       ICD-10-CM    1. Meralgia paresthetica of left side  G57.12    2. Hip pain, left  M25.552 DULoxetine (CYMBALTA) 20 MG capsule     predniSONE (DELTASONE) 20 MG tablet   3. Left buttock pain  M79.18 DULoxetine (CYMBALTA) 20 MG capsule     predniSONE (DELTASONE) 20 MG tablet   4. Fever and chills  R50.9 Symptomatic COVID-19 Virus (Coronavirus) by PCR   5. Acute pharyngitis, unspecified etiology  J02.9 Symptomatic COVID-19 Virus (Coronavirus) by PCR   6. Opioid dependence in remission (H)  F11.21       1, 2, 3, 6.  Overall, patient's pain seems neuropathic in etiology.  A severe form of meralgia paresthetica is certainly possible.  I am less suspicious of nerve impingement in the spine.  This does not seem like typical sciatica.  Typically, I would recommend a trial of NSAIDs, the patient states she cannot tolerate any NSAIDs.  She indicates the Tylenol is not helping adequately.  She indicates that she has not had any benefit from Cymbalta in the past, but I did recommend a trial of this given the neuropathic nature of her pain.  Patient indicated at least 2 different opiates that she thought might be helpful.  I do not feel that this is appropriate at this time especially given her history of opioid dependence and the fact that I am not managing her Suboxone.  I expressed my shared frustration with the patient that we cannot do an in person evaluation at this time to better determine the etiology symptoms.  If not responding to the Cymbalta any longer appears to be ill, she can try a short course of prednisone to see if that is helpful.  4, 5.  Suspect an acute infection.  May be Covid.  Covid test was ordered.  Patient would like to wait a few days before determining if she wishes to proceed with this.    Portions of this note were completed using  Dragon dictation software.  Although reviewed, there may be typographical and other inadvertent errors that remain.               Patient Instructions   Thank you for visiting [facility]    Let's try Cymbalta to help with your neuropathic-type pain.    Can do a COVID test if you're continuing to have symptoms.    If you have worsening pain or your fever goes away, can try a course of prednisone.      Continue tylenol as needed.    If your illness goes away, then you can see one of us in clinic to get to the bottom of what's going on if this isn't helping.      Contact us or return if questions or concerns.     Have a nice day!    Dr. Chino     Return in about 4 weeks (around 1/13/2021) for Recheck.      If you need medication refills, please contact your pharmacy 3 days before your prescriptions runs out or download the [facility] jn for your smart phone.   If you are out of refills, your pharmacy will contact contact the clinic.                                     Mychart assistance [phone]                       Return in about 4 weeks (around 1/13/2021) for Recheck.    Clemente Chino MD, MD  [facility]      Video-Visit Details    Type of service:  Video Visit    Video End Time:2:25 PM    Originating Location (pt. Location): Home    Distant Location (provider location):  [facility]     Platform used for Video Visit: Mai

## 2020-12-16 NOTE — PATIENT INSTRUCTIONS
Thank you for visiting [facility]    Let's try Cymbalta to help with your neuropathic-type pain.    Can do a COVID test if you're continuing to have symptoms.    If you have worsening pain or your fever goes away, can try a course of prednisone.      Continue tylenol as needed.    If your illness goes away, then you can see one of us in clinic to get to the bottom of what's going on if this isn't helping.      Contact us or return if questions or concerns.     Have a nice day!    Dr. Chino     Return in about 4 weeks (around 1/13/2021) for Recheck.      If you need medication refills, please contact your pharmacy 3 days before your prescriptions runs out or download the [facility] jn for your smart phone.   If you are out of refills, your pharmacy will contact contact the clinic.                                     Juan bacon [phone]

## 2020-12-17 DIAGNOSIS — R50.9 FEVER AND CHILLS: ICD-10-CM

## 2020-12-17 DIAGNOSIS — J02.9 ACUTE PHARYNGITIS, UNSPECIFIED ETIOLOGY: ICD-10-CM

## 2020-12-17 PROCEDURE — U0003 INFECTIOUS AGENT DETECTION BY NUCLEIC ACID (DNA OR RNA); SEVERE ACUTE RESPIRATORY SYNDROME CORONAVIRUS 2 (SARS-COV-2) (CORONAVIRUS DISEASE [COVID-19]), AMPLIFIED PROBE TECHNIQUE, MAKING USE OF HIGH THROUGHPUT TECHNOLOGIES AS DESCRIBED BY CMS-2020-01-R: HCPCS | Performed by: FAMILY MEDICINE

## 2020-12-18 LAB
SARS-COV-2 RNA SPEC QL NAA+PROBE: NOT DETECTED
SPECIMEN SOURCE: NORMAL

## 2020-12-19 DIAGNOSIS — F33.1 MODERATE EPISODE OF RECURRENT MAJOR DEPRESSIVE DISORDER (H): ICD-10-CM

## 2020-12-19 DIAGNOSIS — F51.04 PSYCHOPHYSIOLOGICAL INSOMNIA: ICD-10-CM

## 2020-12-19 DIAGNOSIS — F41.9 ANXIETY: ICD-10-CM

## 2020-12-22 NOTE — TELEPHONE ENCOUNTER
Routing refill request to provider for review/approval because:  Labs out of range:     Labs not current:

## 2020-12-27 RX ORDER — QUETIAPINE FUMARATE 200 MG/1
TABLET, FILM COATED ORAL
Qty: 90 TABLET | Refills: 0 | Status: SHIPPED | OUTPATIENT
Start: 2020-12-27 | End: 2021-03-02

## 2020-12-27 RX ORDER — BUPROPION HYDROCHLORIDE 150 MG/1
150 TABLET ORAL EVERY MORNING
Qty: 90 TABLET | Refills: 0 | Status: SHIPPED | OUTPATIENT
Start: 2020-12-27 | End: 2021-03-02

## 2021-01-13 DIAGNOSIS — F41.9 ANXIETY: ICD-10-CM

## 2021-01-13 DIAGNOSIS — F41.0 PANIC ATTACKS: ICD-10-CM

## 2021-01-13 DIAGNOSIS — R11.0 NAUSEA: ICD-10-CM

## 2021-01-14 RX ORDER — ALPRAZOLAM 0.5 MG
TABLET ORAL
Qty: 60 TABLET | Refills: 0 | Status: SHIPPED | OUTPATIENT
Start: 2021-01-14 | End: 2021-02-12

## 2021-01-14 RX ORDER — ONDANSETRON 4 MG/1
TABLET, ORALLY DISINTEGRATING ORAL
Qty: 20 TABLET | Refills: 0 | Status: SHIPPED | OUTPATIENT
Start: 2021-01-14 | End: 2021-02-18

## 2021-01-14 NOTE — TELEPHONE ENCOUNTER
Pending Prescriptions:                       Disp   Refills    ALPRAZolam (XANAX) 0.5 MG tablet [Pharmacy*60 tab*0        Sig: TAKE ONE TABLET BY MOUTH TWO TIMES A DAY AS NEEDED           FOR PANIC ATTACKS(USE SPARINGLY)    ondansetron (ZOFRAN-ODT) 4 MG ODT tab [Pha*20 tab*0        Sig: DISSOLVE ONE TABLET BY MOUTH EVERY 6 HOURS AS NEEDED           FOR NAUSEA    Routing refill request to provider for review/approval because:  Drug not on the [facility] refill protocol   How long should she have the anti nausea

## 2021-02-12 ENCOUNTER — TELEPHONE (OUTPATIENT)
Dept: FAMILY MEDICINE | Facility: OTHER | Age: 46
End: 2021-02-12

## 2021-02-12 DIAGNOSIS — K21.9 GASTROESOPHAGEAL REFLUX DISEASE, UNSPECIFIED WHETHER ESOPHAGITIS PRESENT: Primary | ICD-10-CM

## 2021-02-12 DIAGNOSIS — F41.0 PANIC ATTACKS: ICD-10-CM

## 2021-02-12 DIAGNOSIS — F51.04 PSYCHOPHYSIOLOGICAL INSOMNIA: ICD-10-CM

## 2021-02-14 RX ORDER — ALPRAZOLAM 0.5 MG
TABLET ORAL
Qty: 60 TABLET | Refills: 0 | Status: SHIPPED | OUTPATIENT
Start: 2021-02-14 | End: 2021-03-02

## 2021-02-14 RX ORDER — OMEPRAZOLE 40 MG/1
CAPSULE, DELAYED RELEASE ORAL
Qty: 30 CAPSULE | Refills: 0 | Status: SHIPPED | OUTPATIENT
Start: 2021-02-14 | End: 2021-03-02

## 2021-02-14 RX ORDER — ZOLPIDEM TARTRATE 5 MG/1
TABLET ORAL
Qty: 30 TABLET | Refills: 0 | Status: SHIPPED | OUTPATIENT
Start: 2021-02-14 | End: 2021-03-02

## 2021-02-14 NOTE — TELEPHONE ENCOUNTER
Patient is due for annual physical and follow up on medication refills. I sent in refills for one month. Please call to schedule.  Florecita Story, CNP

## 2021-02-18 DIAGNOSIS — F41.9 ANXIETY: ICD-10-CM

## 2021-02-18 DIAGNOSIS — R11.0 NAUSEA: ICD-10-CM

## 2021-02-18 RX ORDER — ONDANSETRON 4 MG/1
TABLET, ORALLY DISINTEGRATING ORAL
Qty: 20 TABLET | Refills: 0 | Status: SHIPPED | OUTPATIENT
Start: 2021-02-18 | End: 2021-05-12

## 2021-02-18 NOTE — TELEPHONE ENCOUNTER
Pending Prescriptions:                       Disp   Refills    ondansetron (ZOFRAN-ODT) 4 MG ODT tab     20 tab*0            Sig: DISSOLVE ONE TABLET BY MOUTH EVERY 6 HOURS AS           NEEDED FOR NAUSEA    Medication is being filled for 1 time deanne refill only due to:  Patient is due for visit (already scheduled)

## 2021-02-23 ASSESSMENT — ENCOUNTER SYMPTOMS
FEVER: 0
PARESTHESIAS: 1
HEADACHES: 1
DIZZINESS: 1
COUGH: 0
WEAKNESS: 0
CHILLS: 0
SORE THROAT: 1
HEARTBURN: 1
NAUSEA: 1
CONSTIPATION: 1
EYE PAIN: 0
FREQUENCY: 0
HEMATURIA: 0
PALPITATIONS: 0
BREAST MASS: 0
DYSURIA: 1
ARTHRALGIAS: 0
SHORTNESS OF BREATH: 0
MYALGIAS: 1
DIARRHEA: 0
ABDOMINAL PAIN: 0
NERVOUS/ANXIOUS: 1
JOINT SWELLING: 0
HEMATOCHEZIA: 0

## 2021-02-23 ASSESSMENT — PATIENT HEALTH QUESTIONNAIRE - PHQ9
SUM OF ALL RESPONSES TO PHQ QUESTIONS 1-9: 11
10. IF YOU CHECKED OFF ANY PROBLEMS, HOW DIFFICULT HAVE THESE PROBLEMS MADE IT FOR YOU TO DO YOUR WORK, TAKE CARE OF THINGS AT HOME, OR GET ALONG WITH OTHER PEOPLE: VERY DIFFICULT
SUM OF ALL RESPONSES TO PHQ QUESTIONS 1-9: 11

## 2021-02-24 ASSESSMENT — PATIENT HEALTH QUESTIONNAIRE - PHQ9: SUM OF ALL RESPONSES TO PHQ QUESTIONS 1-9: 11

## 2021-03-02 ENCOUNTER — OFFICE VISIT (OUTPATIENT)
Dept: FAMILY MEDICINE | Facility: OTHER | Age: 46
End: 2021-03-02
Payer: COMMERCIAL

## 2021-03-02 ENCOUNTER — TELEPHONE (OUTPATIENT)
Dept: FAMILY MEDICINE | Facility: OTHER | Age: 46
End: 2021-03-02

## 2021-03-02 VITALS
WEIGHT: 152 LBS | RESPIRATION RATE: 16 BRPM | TEMPERATURE: 98.6 F | HEIGHT: 64 IN | BODY MASS INDEX: 25.95 KG/M2 | HEART RATE: 100 BPM | DIASTOLIC BLOOD PRESSURE: 82 MMHG | OXYGEN SATURATION: 99 % | SYSTOLIC BLOOD PRESSURE: 124 MMHG

## 2021-03-02 DIAGNOSIS — Z13.1 SCREENING FOR DIABETES MELLITUS: ICD-10-CM

## 2021-03-02 DIAGNOSIS — G25.81 RESTLESS LEGS SYNDROME (RLS): ICD-10-CM

## 2021-03-02 DIAGNOSIS — M54.2 CHRONIC NECK PAIN: ICD-10-CM

## 2021-03-02 DIAGNOSIS — K21.9 GASTROESOPHAGEAL REFLUX DISEASE, UNSPECIFIED WHETHER ESOPHAGITIS PRESENT: ICD-10-CM

## 2021-03-02 DIAGNOSIS — F41.9 ANXIETY: ICD-10-CM

## 2021-03-02 DIAGNOSIS — R29.898 WEAKNESS OF BOTH HANDS: ICD-10-CM

## 2021-03-02 DIAGNOSIS — M79.18 LEFT BUTTOCK PAIN: ICD-10-CM

## 2021-03-02 DIAGNOSIS — R00.0 SINUS TACHYCARDIA: ICD-10-CM

## 2021-03-02 DIAGNOSIS — J30.2 SEASONAL ALLERGIC RHINITIS, UNSPECIFIED TRIGGER: ICD-10-CM

## 2021-03-02 DIAGNOSIS — E78.2 MIXED HYPERLIPIDEMIA: ICD-10-CM

## 2021-03-02 DIAGNOSIS — M54.2 CERVICAL PAIN: ICD-10-CM

## 2021-03-02 DIAGNOSIS — M54.6 ACUTE BILATERAL THORACIC BACK PAIN: ICD-10-CM

## 2021-03-02 DIAGNOSIS — F41.0 PANIC ATTACKS: ICD-10-CM

## 2021-03-02 DIAGNOSIS — F33.1 MODERATE EPISODE OF RECURRENT MAJOR DEPRESSIVE DISORDER (H): ICD-10-CM

## 2021-03-02 DIAGNOSIS — G89.29 CHRONIC NECK PAIN: ICD-10-CM

## 2021-03-02 DIAGNOSIS — R30.0 DYSURIA: ICD-10-CM

## 2021-03-02 DIAGNOSIS — F51.04 PSYCHOPHYSIOLOGICAL INSOMNIA: ICD-10-CM

## 2021-03-02 DIAGNOSIS — M79.10 MYALGIA: ICD-10-CM

## 2021-03-02 DIAGNOSIS — M65.4 DE QUERVAIN'S DISEASE (TENOSYNOVITIS): Primary | ICD-10-CM

## 2021-03-02 DIAGNOSIS — M70.62 TROCHANTERIC BURSITIS OF LEFT HIP: ICD-10-CM

## 2021-03-02 DIAGNOSIS — M25.552 HIP PAIN, LEFT: ICD-10-CM

## 2021-03-02 LAB
ALBUMIN SERPL-MCNC: 3.7 G/DL (ref 3.4–5)
ALBUMIN UR-MCNC: NEGATIVE MG/DL
ALP SERPL-CCNC: 111 U/L (ref 40–150)
ALT SERPL W P-5'-P-CCNC: 38 U/L (ref 0–50)
ANION GAP SERPL CALCULATED.3IONS-SCNC: 4 MMOL/L (ref 3–14)
APPEARANCE UR: CLEAR
AST SERPL W P-5'-P-CCNC: 22 U/L (ref 0–45)
BILIRUB SERPL-MCNC: 0.3 MG/DL (ref 0.2–1.3)
BILIRUB UR QL STRIP: NEGATIVE
BUN SERPL-MCNC: 13 MG/DL (ref 7–30)
CALCIUM SERPL-MCNC: 8.8 MG/DL (ref 8.5–10.1)
CHLORIDE SERPL-SCNC: 105 MMOL/L (ref 94–109)
CO2 SERPL-SCNC: 31 MMOL/L (ref 20–32)
COLOR UR AUTO: YELLOW
CREAT SERPL-MCNC: 0.63 MG/DL (ref 0.52–1.04)
CRP SERPL-MCNC: 6.3 MG/L (ref 0–8)
ERYTHROCYTE [DISTWIDTH] IN BLOOD BY AUTOMATED COUNT: 12.7 % (ref 10–15)
ERYTHROCYTE [SEDIMENTATION RATE] IN BLOOD BY WESTERGREN METHOD: 12 MM/H (ref 0–20)
GFR SERPL CREATININE-BSD FRML MDRD: >90 ML/MIN/{1.73_M2}
GLUCOSE SERPL-MCNC: 95 MG/DL (ref 70–99)
GLUCOSE UR STRIP-MCNC: NEGATIVE MG/DL
HCT VFR BLD AUTO: 42.8 % (ref 35–47)
HGB BLD-MCNC: 14.1 G/DL (ref 11.7–15.7)
HGB UR QL STRIP: NEGATIVE
KETONES UR STRIP-MCNC: NEGATIVE MG/DL
LDLC SERPL DIRECT ASSAY-MCNC: 133 MG/DL
LEUKOCYTE ESTERASE UR QL STRIP: NEGATIVE
MCH RBC QN AUTO: 30.5 PG (ref 26.5–33)
MCHC RBC AUTO-ENTMCNC: 32.9 G/DL (ref 31.5–36.5)
MCV RBC AUTO: 93 FL (ref 78–100)
NITRATE UR QL: NEGATIVE
PH UR STRIP: 5.5 PH (ref 5–7)
PLATELET # BLD AUTO: 260 10E9/L (ref 150–450)
POTASSIUM SERPL-SCNC: 4.3 MMOL/L (ref 3.4–5.3)
PROT SERPL-MCNC: 7.4 G/DL (ref 6.8–8.8)
RBC # BLD AUTO: 4.62 10E12/L (ref 3.8–5.2)
SODIUM SERPL-SCNC: 140 MMOL/L (ref 133–144)
SOURCE: NORMAL
SP GR UR STRIP: 1.02 (ref 1–1.03)
TSH SERPL DL<=0.005 MIU/L-ACNC: 2.42 MU/L (ref 0.4–4)
UROBILINOGEN UR STRIP-ACNC: 0.2 EU/DL (ref 0.2–1)
WBC # BLD AUTO: 7.7 10E9/L (ref 4–11)

## 2021-03-02 PROCEDURE — 83721 ASSAY OF BLOOD LIPOPROTEIN: CPT | Performed by: STUDENT IN AN ORGANIZED HEALTH CARE EDUCATION/TRAINING PROGRAM

## 2021-03-02 PROCEDURE — 81003 URINALYSIS AUTO W/O SCOPE: CPT | Performed by: STUDENT IN AN ORGANIZED HEALTH CARE EDUCATION/TRAINING PROGRAM

## 2021-03-02 PROCEDURE — 99215 OFFICE O/P EST HI 40 MIN: CPT | Performed by: STUDENT IN AN ORGANIZED HEALTH CARE EDUCATION/TRAINING PROGRAM

## 2021-03-02 PROCEDURE — 80053 COMPREHEN METABOLIC PANEL: CPT | Performed by: STUDENT IN AN ORGANIZED HEALTH CARE EDUCATION/TRAINING PROGRAM

## 2021-03-02 PROCEDURE — 85027 COMPLETE CBC AUTOMATED: CPT | Performed by: STUDENT IN AN ORGANIZED HEALTH CARE EDUCATION/TRAINING PROGRAM

## 2021-03-02 PROCEDURE — 84443 ASSAY THYROID STIM HORMONE: CPT | Performed by: STUDENT IN AN ORGANIZED HEALTH CARE EDUCATION/TRAINING PROGRAM

## 2021-03-02 PROCEDURE — 85652 RBC SED RATE AUTOMATED: CPT | Performed by: STUDENT IN AN ORGANIZED HEALTH CARE EDUCATION/TRAINING PROGRAM

## 2021-03-02 PROCEDURE — 36415 COLL VENOUS BLD VENIPUNCTURE: CPT | Performed by: STUDENT IN AN ORGANIZED HEALTH CARE EDUCATION/TRAINING PROGRAM

## 2021-03-02 PROCEDURE — 86140 C-REACTIVE PROTEIN: CPT | Performed by: STUDENT IN AN ORGANIZED HEALTH CARE EDUCATION/TRAINING PROGRAM

## 2021-03-02 RX ORDER — QUETIAPINE FUMARATE 200 MG/1
200 TABLET, FILM COATED ORAL AT BEDTIME
Qty: 90 TABLET | Refills: 3 | Status: SHIPPED | OUTPATIENT
Start: 2021-03-02 | End: 2022-04-02

## 2021-03-02 RX ORDER — CYCLOBENZAPRINE HCL 5 MG
5 TABLET ORAL 3 TIMES DAILY PRN
Qty: 90 TABLET | Refills: 5 | Status: SHIPPED | OUTPATIENT
Start: 2021-03-02 | End: 2021-03-02

## 2021-03-02 RX ORDER — SIMVASTATIN 10 MG
TABLET ORAL
Qty: 90 TABLET | Refills: 3 | Status: SHIPPED | OUTPATIENT
Start: 2021-03-02 | End: 2022-09-02

## 2021-03-02 RX ORDER — CETIRIZINE HYDROCHLORIDE 10 MG/1
10 TABLET ORAL EVERY MORNING
Qty: 90 TABLET | Refills: 3 | Status: SHIPPED | OUTPATIENT
Start: 2021-03-02 | End: 2022-05-12

## 2021-03-02 RX ORDER — OMEPRAZOLE 40 MG/1
CAPSULE, DELAYED RELEASE ORAL
Qty: 30 CAPSULE | Refills: 11 | Status: SHIPPED | OUTPATIENT
Start: 2021-03-02 | End: 2023-02-01 | Stop reason: ALTCHOICE

## 2021-03-02 RX ORDER — FAMOTIDINE 40 MG/1
40 TABLET, FILM COATED ORAL DAILY
Qty: 30 TABLET | Refills: 11 | Status: SHIPPED | OUTPATIENT
Start: 2021-03-02 | End: 2022-09-02

## 2021-03-02 RX ORDER — CYCLOBENZAPRINE HCL 5 MG
5-10 TABLET ORAL 3 TIMES DAILY PRN
Qty: 90 TABLET | Refills: 5 | Status: SHIPPED | OUTPATIENT
Start: 2021-03-02 | End: 2021-09-01

## 2021-03-02 RX ORDER — ROPINIROLE 0.5 MG/1
TABLET, FILM COATED ORAL
Qty: 90 TABLET | Refills: 3 | Status: SHIPPED | OUTPATIENT
Start: 2021-03-02 | End: 2021-12-02

## 2021-03-02 RX ORDER — ZOLPIDEM TARTRATE 5 MG/1
TABLET ORAL
Qty: 30 TABLET | Refills: 5 | Status: SHIPPED | OUTPATIENT
Start: 2021-03-02 | End: 2021-09-15

## 2021-03-02 RX ORDER — DULOXETIN HYDROCHLORIDE 20 MG/1
20 CAPSULE, DELAYED RELEASE ORAL 2 TIMES DAILY
Qty: 180 CAPSULE | Refills: 3 | Status: SHIPPED | OUTPATIENT
Start: 2021-03-02 | End: 2022-06-29

## 2021-03-02 RX ORDER — PROPRANOLOL HYDROCHLORIDE 20 MG/1
20 TABLET ORAL 2 TIMES DAILY
Qty: 180 TABLET | Refills: 3 | Status: SHIPPED | OUTPATIENT
Start: 2021-03-02 | End: 2022-06-29

## 2021-03-02 RX ORDER — BUPROPION HYDROCHLORIDE 150 MG/1
150 TABLET ORAL EVERY MORNING
Qty: 90 TABLET | Refills: 3 | Status: SHIPPED | OUTPATIENT
Start: 2021-03-02 | End: 2021-09-28

## 2021-03-02 RX ORDER — ALPRAZOLAM 0.5 MG
TABLET ORAL
Qty: 60 TABLET | Refills: 0 | Status: SHIPPED | OUTPATIENT
Start: 2021-03-02 | End: 2021-04-15

## 2021-03-02 ASSESSMENT — PAIN SCALES - GENERAL: PAINLEVEL: SEVERE PAIN (7)

## 2021-03-02 ASSESSMENT — MIFFLIN-ST. JEOR: SCORE: 1306.6

## 2021-03-02 NOTE — PROGRESS NOTES
Assessment & Plan     Panic attacks  Chronic anxiety with panic attacks. Reviewed  and no concerns. Continue to use sparingly.   - ALPRAZolam (XANAX) 0.5 MG tablet; TAKE ONE TABLET BY MOUTH TWO TIMES A DAY AS NEEDED FOR PANIC ATTACKS(USE SPARINGLY)    Anxiety  Chronic. Ongoing situational stress with son in  [place], grieving loss of mother last year. Continue current medications. Recommend setting up counseling.   - buPROPion (WELLBUTRIN XL) 150 MG 24 hr tablet; Take 1 tablet (150 mg) by mouth every morning  - QUEtiapine (SEROQUEL) 200 MG tablet; Take 1 tablet (200 mg) by mouth At Bedtime  - propranolol (INDERAL) 20 MG tablet; Take 1 tablet (20 mg) by mouth 2 times daily    Moderate episode of recurrent major depressive disorder (H)  Stable. Continue current medication(s) and dose(s).   - buPROPion (WELLBUTRIN XL) 150 MG 24 hr tablet; Take 1 tablet (150 mg) by mouth every morning    Seasonal allergic rhinitis, unspecified trigger  Stable. Continue current medication(s) and dose(s).   - cetirizine (ZYRTEC) 10 MG tablet; Take 1 tablet (10 mg) by mouth every morning    Hip pain, left  Not improving. Recommend seeing ortho as symptoms may suggest bursitis and may benefit from steroid injection.   - DULoxetine (CYMBALTA) 20 MG capsule; Take 1 capsule (20 mg) by mouth 2 times daily    Left buttock pain  - DULoxetine (CYMBALTA) 20 MG capsule; Take 1 capsule (20 mg) by mouth 2 times daily    Gastroesophageal reflux disease, unspecified whether esophagitis present  Stable. Continue current medication(s) and dose(s).   - famotidine (PEPCID) 40 MG tablet; Take 1 tablet (40 mg) by mouth daily  - omeprazole (PRILOSEC) 40 MG DR capsule; TAKE ONE CAPSULE BY MOUTH ONCE DAILY    Psychophysiological insomnia  Stable. Continue current medication(s) and dose(s).   - QUEtiapine (SEROQUEL) 200 MG tablet; Take 1 tablet (200 mg) by mouth At Bedtime  - zolpidem (AMBIEN) 5 MG tablet; TAKE ONE TABLET BY MOUTH NIGHTLY AS  NEEDED FOR SLEEP    Sinus tachycardia  Stable. Continue current medication(s) and dose(s).   - propranolol (INDERAL) 20 MG tablet; Take 1 tablet (20 mg) by mouth 2 times daily    Restless legs syndrome (RLS)  Stable. Continue current medication(s) and dose(s).   - rOPINIRole (REQUIP) 0.5 MG tablet; TAKE ONE TABLET BY MOUTH EVERY NIGHT AT BEDTIME    Mixed hyperlipidemia  Stable. Continue current medication(s) and dose(s).   - simvastatin (ZOCOR) 10 MG tablet; TAKE ONE TABLET BY MOUTH EVERY NIGHT AT BEDTIME  - LDL cholesterol direct    De Quervain's disease (tenosynovitis)  Symptoms consistent with DeQuervain's. May try wearing wrist braces while at work to see if this helps. Recommend icing. Unable to take NSAIDs due to GI side effects. Recommend ortho consult to verify diagnosis and discuss possible injection.   - Orthopedic & Spine  Referral; Future    Trochanteric bursitis of left hip  See above notes  - Orthopedic & Spine  Referral; Future  - ESR: Erythrocyte sedimentation rate  - CRP, inflammation    Cervical pain  Recommend further evaluation by ortho spine  - Orthopedic & Spine  Referral; Future    Acute bilateral thoracic back pain  Pain started with twisting injury coming down ladder at work. Recommend evaluation by ortho  - Orthopedic & Spine  Referral; Future    Weakness of both hands  - Orthopedic & Spine  Referral; Future    Screening for diabetes mellitus  - Comprehensive metabolic panel (BMP + Alb, Alk Phos, ALT, AST, Total. Bili, TP)    Myalgia  Labs normal. Unclear cause of aches, may be statin side effect. Has multiple joint aches and muscle aches which may be due to physical labor with job, tension from anxiety/stress, diet, smoking, etc.   - TSH with free T4 reflex  - CBC with platelets    Dysuria  UA is completely normal. Recommend urology evaluation if symptoms persist.   - *UA reflex to Microscopic and Culture ([place] [facility] (except  "[place] and [place])    Chronic neck pain  - Cyclobenzaprine refilled        45 minutes spent on the date of the encounter doing chart review, interpretation of tests, patient visit, documentation        BMI:   Estimated body mass index is 26.24 kg/m  as calculated from the following:    Height as of this encounter: 1.621 m (5' 3.82\").    Weight as of this encounter: 68.9 kg (152 lb).   Weight management plan: Discussed healthy diet and exercise guidelines        No follow-ups on file.    Marisa Story, JEANNIE CNP  [facility] DARCIE Garcia is a 45 year old who presents for the following health issues     Healthy Habits:   PHQ-2 Total Score: 5         Hyperlipidemia Follow-Up      Are you regularly taking any medication or supplement to lower your cholesterol?   Yes- .    Are you having muscle aches or other side effects that you think could be caused by your cholesterol lowering medication?  No    Depression and Anxiety Follow-Up    How are you doing with your depression since your last visit? No change    How are you doing with your anxiety since your last visit?  No change    Are you having other symptoms that might be associated with depression or anxiety? Yes:  anxious, depressed     Have you had a significant life event? Job Concerns and Grief or Loss     Do you have any concerns with your use of alcohol or other drugs? No    Social History     Tobacco Use     Smoking status: Current Every Day Smoker     Packs/day: 0.25     Years: 20.00     Pack years: 5.00     Types: Cigarettes     Smokeless tobacco: Never Used     Tobacco comment: 5-6 cigs daily   Substance Use Topics     Alcohol use: Yes     Comment: occasional drinks     Drug use: No     PHQ 7/15/2020 9/12/2020 2/23/2021   PHQ-9 Total Score 14 8 11   Q9: Thoughts of better off dead/self-harm past 2 weeks Not at all Not at all Not at all     BO-7 SCORE 5/20/2020 7/15/2020 9/12/2020   Total Score - - 17 (severe " anxiety)   Total Score 10 15 17     Last PHQ-9 2/23/2021   1.  Little interest or pleasure in doing things 2   2.  Feeling down, depressed, or hopeless 2   3.  Trouble falling or staying asleep, or sleeping too much 3   4.  Feeling tired or having little energy 2   5.  Poor appetite or overeating 1   6.  Feeling bad about yourself 1   7.  Trouble concentrating 0   8.  Moving slowly or restless 0   Q9: Thoughts of better off dead/self-harm past 2 weeks 0   PHQ-9 Total Score 11   Difficulty at work, home, or with people -     BO-7  9/12/2020   1. Feeling nervous, anxious, or on edge 3   2. Not being able to stop or control worrying 3   3. Worrying too much about different things 3   4. Trouble relaxing 1   5. Being so restless that it is hard to sit still 1   6. Becoming easily annoyed or irritable 3   7. Feeling afraid, as if something awful might happen 3   BO-7 Total Score 17   If you checked any problems, how difficult have they made it for you to do your work, take care of things at home, or get along with other people? -       Suicide Assessment Five-step Evaluation and Treatment (SAFE-T)      How many servings of fruits and vegetables do you eat daily?  2-3    On average, how many sweetened beverages do you drink each day (Examples: soda, juice, sweet tea, etc.  Do NOT count diet or artificially sweetened beverages)?   1    How many days per week do you exercise enough to make your heart beat faster? 3 or less    How many minutes a day do you exercise enough to make your heart beat faster? 20 - 29    How many days per week do you miss taking your medication? 0    Having UTI symptoms - burning, frequency.  She has been having hot flashes started a year ago. History of hysterectomy.   She hurt her mid back at work. She was coming down a ladder 6 months ago and twisted and felt pain in the mid back. She has been wearing a back brace every day, using menthol patches, Biofreeze but these are not helping. The pain  "wakes her from sleep at times.  Left cervical spine pain, has knot just below C7 has been there for a long time, shooting pain upwards when she looks left over her shoulder. Lifts batteries at work, puts stuff away on shelves, repetitive motions with hands/wrists, shoulders, arms.  Wrist and hands are getting weaker especially with twisting of lid will have pain that is sharp and shooting up along thumb. Poor strength in hands recently, squeezing to pick something up will cause shooting pain and hands feel weak. Both wrists and started at the same time.   History of right wrist fracture around 16 years old.  Knees have been hurting. Stands on concrete all day.  Vertigo last week x 4 days that resolved. Left ear still itchy and bothersome. Cortisporin drops did not help.   Pain over lateral hips - with paresthesias. Was put on prednisone and duloxetine and pain in right has improved some but left is still bothersome.       Review of Systems   Constitutional, HEENT, cardiovascular, pulmonary, GI, , musculoskeletal, neuro, skin, endocrine and psych systems are negative, except as otherwise noted.      Objective    /82   Pulse 100   Temp 98.6  F (37  C) (Temporal)   Resp 16   Ht 1.621 m (5' 3.82\")   Wt 68.9 kg (152 lb)   LMP  (LMP Unknown)   SpO2 99%   BMI 26.24 kg/m    Body mass index is 26.24 kg/m .  Physical Exam   GENERAL: healthy, alert and no distress  EYES: Eyes grossly normal to inspection, PERRL and conjunctivae and sclerae normal  HENT: ear canals and TM's normal, nose and mouth without ulcers or lesions  NECK: no adenopathy, no asymmetry, masses, or scars and thyroid normal to palpation  RESP: lungs clear to auscultation - no rales, rhonchi or wheezes  CV: regular rate and rhythm, normal S1 S2, no S3 or S4, no murmur, click or rub, no peripheral edema and peripheral pulses strong  ABDOMEN: soft, nontender, no hepatosplenomegaly, no masses and bowel sounds normal  MS: no gross musculoskeletal " defects noted, no edema  SKIN: no suspicious lesions or rashes  NEURO: Normal strength and tone, mentation intact and speech normal  PSYCH: mentation appears normal, affect normal/bright    Results for orders placed or performed in visit on 03/02/21   *UA reflex to Microscopic and Culture ([place] and [facility] (except [place] and [place])     Status: None    Specimen: Midstream Urine   Result Value Ref Range    Color Urine Yellow     Appearance Urine Clear     Glucose Urine Negative NEG^Negative mg/dL    Bilirubin Urine Negative NEG^Negative    Ketones Urine Negative NEG^Negative mg/dL    Specific Gravity Urine 1.020 1.003 - 1.035    Blood Urine Negative NEG^Negative    pH Urine 5.5 5.0 - 7.0 pH    Protein Albumin Urine Negative NEG^Negative mg/dL    Urobilinogen Urine 0.2 0.2 - 1.0 EU/dL    Nitrite Urine Negative NEG^Negative    Leukocyte Esterase Urine Negative NEG^Negative    Source Midstream Urine    TSH with free T4 reflex     Status: None   Result Value Ref Range    TSH 2.42 0.40 - 4.00 mU/L   Comprehensive metabolic panel (BMP + Alb, Alk Phos, ALT, AST, Total. Bili, TP)     Status: None   Result Value Ref Range    Sodium 140 133 - 144 mmol/L    Potassium 4.3 3.4 - 5.3 mmol/L    Chloride 105 94 - 109 mmol/L    Carbon Dioxide 31 20 - 32 mmol/L    Anion Gap 4 3 - 14 mmol/L    Glucose 95 70 - 99 mg/dL    Urea Nitrogen 13 7 - 30 mg/dL    Creatinine 0.63 0.52 - 1.04 mg/dL    GFR Estimate >90 >60 mL/min/[1.73_m2]    GFR Estimate If Black >90 >60 mL/min/[1.73_m2]    Calcium 8.8 8.5 - 10.1 mg/dL    Bilirubin Total 0.3 0.2 - 1.3 mg/dL    Albumin 3.7 3.4 - 5.0 g/dL    Protein Total 7.4 6.8 - 8.8 g/dL    Alkaline Phosphatase 111 40 - 150 U/L    ALT 38 0 - 50 U/L    AST 22 0 - 45 U/L   CBC with platelets     Status: None   Result Value Ref Range    WBC 7.7 4.0 - 11.0 10e9/L    RBC Count 4.62 3.8 - 5.2 10e12/L    Hemoglobin 14.1 11.7 - 15.7 g/dL    Hematocrit 42.8 35.0 - 47.0 %    MCV 93 78 - 100 fl    MCH 30.5  26.5 - 33.0 pg    MCHC 32.9 31.5 - 36.5 g/dL    RDW 12.7 10.0 - 15.0 %    Platelet Count 260 150 - 450 10e9/L   ESR: Erythrocyte sedimentation rate     Status: None   Result Value Ref Range    Sed Rate 12 0 - 20 mm/h   CRP, inflammation     Status: None   Result Value Ref Range    CRP Inflammation 6.3 0.0 - 8.0 mg/L   LDL cholesterol direct     Status: Abnormal   Result Value Ref Range    LDL Cholesterol Direct 133 (H) <100 mg/dL

## 2021-03-02 NOTE — LETTER
[facility]  [address]  [place] [address]  Phone: [phone]      March 3, 2021      RE: Radha Beckwith  [id number] [place] [address]        To whom it may concern:    Radha Beckwith is under my professional care. The employee is ABLE to return to work today.    When the patient returns to work, the following restrictions apply until 4/1/21:  Lift, carry, push, and pull no more than:  0-10 lbs.  Limit prolonged standing or sitting in one position. Okay to walk frequently but do recommend that she alternates time spent walking with periods of seated tasks at work.     Sincerely,      Florecita Story, CNP

## 2021-03-02 NOTE — TELEPHONE ENCOUNTER
[facility] calling regarding message below. Marisa please review and advise.     cyclobenzaprine (FLEXERIL) 5 MG tablet 90 tablet 5 3/2/2021  No   Sig - Route: Take 1 tablet (5 mg) by mouth 3 times daily as needed for muscle spasms TAKE 1-2 TABLETS BY MOUTH NIGHTLY AS NEEDED FOR MUSCLE SPASMS - Oral   Sent to pharmacy as: Cyclobenzaprine HCl 5 MG Oral Tablet (FLEXERIL)   Class: E-Prescribe     What should the sig be?   1. 3 times daily prn or 2. Take 1-2 tablets by mouth nightly PRN for muscle spasms.     Please call pharmacy back at [phone]    Wandy Stewart RN, BSN  [facility]  March 2, 2021

## 2021-03-09 ENCOUNTER — OFFICE VISIT (OUTPATIENT)
Dept: ORTHOPEDICS | Facility: CLINIC | Age: 46
End: 2021-03-09
Attending: STUDENT IN AN ORGANIZED HEALTH CARE EDUCATION/TRAINING PROGRAM
Payer: COMMERCIAL

## 2021-03-09 VITALS — OXYGEN SATURATION: 97 % | SYSTOLIC BLOOD PRESSURE: 134 MMHG | HEART RATE: 84 BPM | DIASTOLIC BLOOD PRESSURE: 77 MMHG

## 2021-03-09 DIAGNOSIS — G56.03 BILATERAL CARPAL TUNNEL SYNDROME: ICD-10-CM

## 2021-03-09 DIAGNOSIS — R29.898 WEAKNESS OF BOTH HANDS: ICD-10-CM

## 2021-03-09 DIAGNOSIS — M65.4 DE QUERVAIN'S DISEASE (TENOSYNOVITIS): Primary | ICD-10-CM

## 2021-03-09 PROCEDURE — 99203 OFFICE O/P NEW LOW 30 MIN: CPT | Performed by: ORTHOPAEDIC SURGERY

## 2021-03-09 ASSESSMENT — PAIN SCALES - GENERAL: PAINLEVEL: MODERATE PAIN (5)

## 2021-03-09 NOTE — LETTER
3/9/2021         RE: Rad[place]  [id number] [place] [address]        Dear Colleague,    Thank you for referring your patient, Radha Beckwith, to the [facility]. Please see a copy of my visit note below.    SUBJECTIVE:   Radha Beckwith is a 45 year old female who is seen in consultation at the request of Dr. Story for evaluation of bilateral hand pain that has been present approximately 2 years, getting worse. Suspected bilateral DeQuervains  right > left pain.    Present symptoms: pain radial wrist, up to thumb and down forearm   Pain to open jars. Weakness  Pain driving with sudden movements.  Has has some numbness/tingling 3rd and 4th fingers and thumb, but not often. 5th finger only today.    Treatments tried to this point: tylenol    Orthopedic PMH: neck issues.    Review of Systems:  Constitutional:  NEGATIVE for fever, chills, change in weight  Integumentary/Skin:  NEGATIVE for worrisome rashes, moles or lesions  Eyes:  NEGATIVE for vision changes or irritation  ENT/Mouth:  NEGATIVE for ear, mouth and throat problems  Resp:  NEGATIVE for significant cough or SOB  Breast:  NEGATIVE for masses, tenderness or discharge  CV:  NEGATIVE for chest pain, palpitations or peripheral edema  GI:  NEGATIVE for nausea, abdominal pain, heartburn, or change in bowel habits  :  Negative   Musculoskeletal:  See HPI above.  Multiple MSK complaints.  Neuro:  NEGATIVE for weakness, dizziness or paresthesias  Endocrine:  NEGATIVE for temperature intolerance, skin/hair changes  Heme/allergy/immune:  NEGATIVE for bleeding problems  Psychiatric:  NEGATIVE for changes in mood or affect    Past Medical History:   Past Medical History:   Diagnosis Date     Abdominal pain, right lower quadrant 03/09/08    Admit. Discharged 03/10/08     Anxiety attack 7/31/2015     Dehydration      Gastric ulcer 7/31/2015     GERD (gastroesophageal reflux disease) 7/28/2010    Takes omeprazole and  metoclopramide      Opioid dependence in remission (H) 8/2/2015     Other and unspecified ovarian cyst      Papanicolaou smear of cervix with low grade squamous intraepithelial lesion (LGSIL) 07/07/2017     Past Surgical History:   Past Surgical History:   Procedure Laterality Date     BIOPSY CERVICAL, LOCAL EXCISION, SINGLE/MULTIPLE N/A 8/10/2017    Procedure: BIOPSY CERVICAL, LOCAL EXCISION, SINGLE/MULTIPLE;;  Surgeon: Michael Chandler MD;  Location: [facility] OR     COLPOSCOPY, BIOPSY, COMBINED N/A 8/10/2017    Procedure: COMBINED COLPOSCOPY, BIOPSY;  Colposcopy with Cervical Biopsies and Endometrial Biopsy, Exam with Ultrasound;  Surgeon: Michael Chandler MD;  Location: [facility] OR     ESOPHAGOSCOPY, GASTROSCOPY, DUODENOSCOPY (EGD), COMBINED N/A 4/17/2017    Procedure: COMBINED ESOPHAGOSCOPY, GASTROSCOPY, DUODENOSCOPY (EGD);  Surgeon: Ibrahima Esposito MD;  Location: [facility] GI     EXAM UNDER ANESTHESIA PELVIC N/A 8/10/2017    Procedure: EXAM UNDER ANESTHESIA PELVIC;;  Surgeon: Michael Chandler MD;  Location: [facility] OR     HYSTERECTOMY       HYSTERECTOMY, PAP NO LONGER INDICATED       LAPAROSCOPIC CHOLECYSTECTOMY N/A 2/2/2018    Procedure: LAPAROSCOPIC CHOLECYSTECTOMY;  Laparoscopic Cholecystectomy;  Surgeon: Tigre Lowry DO;  Location: [facility] OR     LAPAROSCOPIC HYSTERECTOMY TOTAL N/A 10/30/2017    Procedure: LAPAROSCOPIC HYSTERECTOMY TOTAL;  LAPAROSCOPIC HYSTERECTOMY TOTAL POSSIBLE SALPINGO-OOPHERECTOMY (BILATERAL);  Surgeon: Michael Chandler MD;  Location: [facility] OR     ZZHC UGI ENDOSCOPY, SIMPLE EXAM  01/07/08     Family History:   Family History   Problem Relation Age of Onset     Depression Mother      Respiratory Mother      Chronic Obstructive Pulmonary Disease Mother      Cerebrovascular Disease Father         Brain anyeurism     Adrenal Disorder Other      Chronic Obstructive Pulmonary Disease Other      Breast Cancer Cousin      Social History:   Social History     Tobacco Use      Smoking status: Current Every Day Smoker     Packs/day: 0.25     Years: 20.00     Pack years: 5.00     Types: Cigarettes     Smokeless tobacco: Never Used     Tobacco comment: 5-6 cigs daily   Substance Use Topics     Alcohol use: Yes     Comment: occasional drinks       OBJECTIVE:  Physical Exam:  /77 (BP Location: Right arm, Patient Position: Sitting, Cuff Size: Adult Regular)   Pulse 84   LMP  (LMP Unknown)   SpO2 97%   General Appearance: healthy, alert and no distress   Skin: no suspicious lesions or rashes  Neuro: Normal strength and tone, mentation intact and speech normal  Vascular: good pulses, and cappillary refill   Lymph: no lymphadenopathy   Psych:  mentation appears normal and affect normal/bright  Resp: no increased work of breathing     Bilateral Hand Exam:  Inspection: no swelling  Good  strength  Tender: 1st dorsal compartment  + Finklesteins  + carpal tunnel tinels left   Equivocal Phalens bilateral   Sensation: mild decreased sensation left index      Rh factor, ALEXSANDER, ESR, CRP all normal in the past.     ASSESSMENT:   Encounter Diagnoses   Name Primary?     De Quervain's disease (tenosynovitis)      Weakness of both hands    possible carpal tunnel syndrome bilateral     PLAN:   Thumb spica splints given.  Wear at night, and on/off during day  voltaren gel suggested  Hand therapy.  Work issues: avoid strong grasping, heavy and repetitive lifting x 6 weeks. Should be allowed to wear wrist braces.     Return to clinic: as needed, consider corticosteroid injection if no improvement in 6 weeks     TJ Patterson MD  Dept. Orthopedic Surgery  [facility]       Again, thank you for allowing me to participate in the care of your patient.        Sincerely,        Dwaine Patterson MD

## 2021-03-09 NOTE — PROGRESS NOTES
SUBJECTIVE:   aRdha Beckwith is a 45 year old female who is seen in consultation at the request of Dr. Story for evaluation of bilateral hand pain that has been present approximately 2 years, getting worse. Suspected bilateral DeQuervains  right > left pain.    Present symptoms: pain radial wrist, up to thumb and down forearm   Pain to open jars. Weakness  Pain driving with sudden movements.  Has has some numbness/tingling 3rd and 4th fingers and thumb, but not often. 5th finger only today.    Treatments tried to this point: tylenol    Orthopedic PMH: neck issues.    Review of Systems:  Constitutional:  NEGATIVE for fever, chills, change in weight  Integumentary/Skin:  NEGATIVE for worrisome rashes, moles or lesions  Eyes:  NEGATIVE for vision changes or irritation  ENT/Mouth:  NEGATIVE for ear, mouth and throat problems  Resp:  NEGATIVE for significant cough or SOB  Breast:  NEGATIVE for masses, tenderness or discharge  CV:  NEGATIVE for chest pain, palpitations or peripheral edema  GI:  NEGATIVE for nausea, abdominal pain, heartburn, or change in bowel habits  :  Negative   Musculoskeletal:  See HPI above.  Multiple MSK complaints.  Neuro:  NEGATIVE for weakness, dizziness or paresthesias  Endocrine:  NEGATIVE for temperature intolerance, skin/hair changes  Heme/allergy/immune:  NEGATIVE for bleeding problems  Psychiatric:  NEGATIVE for changes in mood or affect    Past Medical History:   Past Medical History:   Diagnosis Date     Abdominal pain, right lower quadrant 03/09/08    Admit. Discharged 03/10/08     Anxiety attack 7/31/2015     Dehydration      Gastric ulcer 7/31/2015     GERD (gastroesophageal reflux disease) 7/28/2010    Takes omeprazole and metoclopramide      Opioid dependence in remission (H) 8/2/2015     Other and unspecified ovarian cyst      Papanicolaou smear of cervix with low grade squamous intraepithelial lesion (LGSIL) 07/07/2017     Past Surgical History:   Past Surgical History:    Procedure Laterality Date     BIOPSY CERVICAL, LOCAL EXCISION, SINGLE/MULTIPLE N/A 8/10/2017    Procedure: BIOPSY CERVICAL, LOCAL EXCISION, SINGLE/MULTIPLE;;  Surgeon: Michael Chandler MD;  [facility]     COLPOSCOPY, BIOPSY, COMBINED N/A 8/10/2017    Procedure: COMBINED COLPOSCOPY, BIOPSY;  Colposcopy with Cervical Biopsies and Endometrial Biopsy, Exam with Ultrasound;  Surgeon: Michael Chandler MD;  [facility]     ESOPHAGOSCOPY, GASTROSCOPY, DUODENOSCOPY (EGD), COMBINED N/A 4/17/2017    Procedure: COMBINED ESOPHAGOSCOPY, GASTROSCOPY, DUODENOSCOPY (EGD);  Surgeon: Ibrahima Esposito MD;  [facility]     EXAM UNDER ANESTHESIA PELVIC N/A 8/10/2017    Procedure: EXAM UNDER ANESTHESIA PELVIC;;  Surgeon: Michael Chandler MD;  [facility]     HYSTERECTOMY       HYSTERECTOMY, PAP NO LONGER INDICATED       LAPAROSCOPIC CHOLECYSTECTOMY N/A 2/2/2018    Procedure: LAPAROSCOPIC CHOLECYSTECTOMY;  Laparoscopic Cholecystectomy;  Surgeon: Tigre Lowry DO;  [facility]     LAPAROSCOPIC HYSTERECTOMY TOTAL N/A 10/30/2017    Procedure: LAPAROSCOPIC HYSTERECTOMY TOTAL;  LAPAROSCOPIC HYSTERECTOMY TOTAL POSSIBLE SALPINGO-OOPHERECTOMY (BILATERAL);  Surgeon: Michael Chandler MD;  [facility]     [facility] UGI ENDOSCOPY, SIMPLE EXAM  01/07/08     Family History:   Family History   Problem Relation Age of Onset     Depression Mother      Respiratory Mother      Chronic Obstructive Pulmonary Disease Mother      Cerebrovascular Disease Father         Brain anyeurism     Adrenal Disorder Other      Chronic Obstructive Pulmonary Disease Other      Breast Cancer Cousin      Social History:   Social History     Tobacco Use     Smoking status: Current Every Day Smoker     Packs/day: 0.25     Years: 20.00     Pack years: 5.00     Types: Cigarettes     Smokeless tobacco: Never Used     Tobacco comment: 5-6 cigs daily   Substance Use Topics     Alcohol use: Yes     Comment:  occasional drinks       OBJECTIVE:  Physical Exam:  /77 (BP Location: Right arm, Patient Position: Sitting, Cuff Size: Adult Regular)   Pulse 84   LMP  (LMP Unknown)   SpO2 97%   General Appearance: healthy, alert and no distress   Skin: no suspicious lesions or rashes  Neuro: Normal strength and tone, mentation intact and speech normal  Vascular: good pulses, and cappillary refill   Lymph: no lymphadenopathy   Psych:  mentation appears normal and affect normal/bright  Resp: no increased work of breathing     Bilateral Hand Exam:  Inspection: no swelling  Good  strength  Tender: 1st dorsal compartment  + Finklesteins  + carpal tunnel tinels left   Equivocal Phalens bilateral   Sensation: mild decreased sensation left index      Rh factor, ALEXSANDER, ESR, CRP all normal in the past.     ASSESSMENT:   Encounter Diagnoses   Name Primary?     De Quervain's disease (tenosynovitis)      Weakness of both hands    possible carpal tunnel syndrome bilateral     PLAN:   Thumb spica splints given.  Wear at night, and on/off during day  voltaren gel suggested  Hand therapy.  Work issues: avoid strong grasping, heavy and repetitive lifting x 6 weeks. Should be allowed to wear wrist braces.     Return to clinic: as needed, consider corticosteroid injection if no improvement in 6 weeks     TJ Patterson MD  Dept. Orthopedic Surgery  [facility]

## 2021-03-11 ENCOUNTER — OFFICE VISIT (OUTPATIENT)
Dept: NEUROSURGERY | Facility: CLINIC | Age: 46
End: 2021-03-11
Payer: COMMERCIAL

## 2021-03-11 VITALS
WEIGHT: 152 LBS | SYSTOLIC BLOOD PRESSURE: 124 MMHG | DIASTOLIC BLOOD PRESSURE: 84 MMHG | HEIGHT: 64 IN | BODY MASS INDEX: 25.95 KG/M2 | TEMPERATURE: 98 F | OXYGEN SATURATION: 97 % | HEART RATE: 91 BPM

## 2021-03-11 DIAGNOSIS — M54.16 LUMBAR RADICULOPATHY: Primary | ICD-10-CM

## 2021-03-11 PROCEDURE — 99203 OFFICE O/P NEW LOW 30 MIN: CPT | Performed by: PHYSICIAN ASSISTANT

## 2021-03-11 RX ORDER — HYDROCODONE BITARTRATE AND ACETAMINOPHEN 5; 325 MG/1; MG/1
1 TABLET ORAL EVERY 6 HOURS PRN
Qty: 20 TABLET | Refills: 0 | Status: SHIPPED | OUTPATIENT
Start: 2021-03-11 | End: 2021-09-28

## 2021-03-11 ASSESSMENT — MIFFLIN-ST. JEOR: SCORE: 1306.3

## 2021-03-11 ASSESSMENT — PAIN SCALES - GENERAL: PAINLEVEL: SEVERE PAIN (7)

## 2021-03-11 NOTE — LETTER
"    3/11/2021         RE: Radha Beckwith  [id number] [facility] [address]        Dear Colleague,    Thank you for referring your patient, Radha Beckwith, to the [facility]UROS[facility]IC [place]. Please see a copy of my visit note below.    Radha Beckwith is a 45 year old female who presents for:  Chief Complaint   Patient presents with     Neurologic Problem     Bilateral back and cervical pain         Initial Vitals:  /84   Pulse 91   Temp 98  F (36.7  C) (Temporal)   Ht 5' 3.8\" (1.621 m)   Wt 152 lb (68.9 kg)   LMP  (LMP Unknown)   SpO2 97%   BMI 26.25 kg/m   Estimated body mass index is 26.25 kg/m  as calculated from the following:    Height as of this encounter: 5' 3.8\" (1.621 m).    Weight as of this encounter: 152 lb (68.9 kg).. Body surface area is 1.76 meters squared. BP completed using cuff size: regular  Severe Pain (7)    Nursing Comments:     Halley Meyer [facility]      Dr. Azam Arteaga  [facility]  Neurosurgery Clinic Visit      CC: neck and back pain    Primary care Provider: Marisa Story    Referring Provider:  Marisa Story CNP      Reason For Visit:   I was asked to consult on the patient for neck and back pain.      HPI: Radha Beckwith is a 45 year old female who presents for evaluation of her chief complaint of neck and low back pain.  Symptoms have been gradually worsening for several years.  She experienced an event several weeks ago, when she was twisting while lifting heavy boxes, while up on a ladder.  She experienced sharp pain that extended from her lumbar spine up to her neck.  She also describes pain that radiates into her left hip.  She was recently seen, and placed on Cymbalta, which has helped with some of her hip pain.  She has not had any recent physical therapy or injections.  She states she had injections about 9 years ago, but they were not helpful.  She denies any bowel or bladder " changes, or any problems with balance or coordination.    Past Medical History:   Diagnosis Date     Abdominal pain, right lower quadrant 03/09/08    Admit. Discharged 03/10/08     Anxiety attack 7/31/2015     Dehydration      Gastric ulcer 7/31/2015     GERD (gastroesophageal reflux disease) 7/28/2010    Takes omeprazole and metoclopramide      Opioid dependence in remission (H) 8/2/2015     Other and unspecified ovarian cyst      Papanicolaou smear of cervix with low grade squamous intraepithelial lesion (LGSIL) 07/07/2017       Past Medical History reviewed with patient during visit.    Past Surgical History:   Procedure Laterality Date     BIOPSY CERVICAL, LOCAL EXCISION, SINGLE/MULTIPLE N/A 8/10/2017    Procedure: BIOPSY CERVICAL, LOCAL EXCISION, SINGLE/MULTIPLE;;  Surgeon: Michael Chandler MD;  [facility]     COLPOSCOPY, BIOPSY, COMBINED N/A 8/10/2017    Procedure: COMBINED COLPOSCOPY, BIOPSY;  Colposcopy with Cervical Biopsies and Endometrial Biopsy, Exam with Ultrasound;  Surgeon: Michael Chandler MD;  [facility]     ESOPHAGOSCOPY, GASTROSCOPY, DUODENOSCOPY (EGD), COMBINED N/A 4/17/2017    Procedure: COMBINED ESOPHAGOSCOPY, GASTROSCOPY, DUODENOSCOPY (EGD);  Surgeon: Ibrahima Esposito MD;  [facility]     EXAM UNDER ANESTHESIA PELVIC N/A 8/10/2017    Procedure: EXAM UNDER ANESTHESIA PELVIC;;  Surgeon: Michael Chandler MD;  [facility]     HYSTERECTOMY       HYSTERECTOMY, PAP NO LONGER INDICATED       LAPAROSCOPIC CHOLECYSTECTOMY N/A 2/2/2018    Procedure: LAPAROSCOPIC CHOLECYSTECTOMY;  Laparoscopic Cholecystectomy;  Surgeon: Tigre Lowry DO;  [facility]     LAPAROSCOPIC HYSTERECTOMY TOTAL N/A 10/30/2017    Procedure: LAPAROSCOPIC HYSTERECTOMY TOTAL;  LAPAROSCOPIC HYSTERECTOMY TOTAL POSSIBLE SALPINGO-OOPHERECTOMY (BILATERAL);  Surgeon: Michael Chandler MD;  [facility]     [facility] UGI ENDOSCOPY, SIMPLE EXAM  01/07/08     Past Surgical  History reviewed with patient during visit.    Current Outpatient Medications   Medication     ACETAMINOPHEN PO     ALPRAZolam (XANAX) 0.5 MG tablet     buprenorphine HCl-naloxone HCl (SUBOXONE) 2-0.5 MG per film     buPROPion (WELLBUTRIN XL) 150 MG 24 hr tablet     carbamide peroxide (DEBROX) 6.5 % otic solution     cetirizine (ZYRTEC) 10 MG tablet     cyclobenzaprine (FLEXERIL) 5 MG tablet     DULoxetine (CYMBALTA) 20 MG capsule     famotidine (PEPCID) 40 MG tablet     HYDROcodone-acetaminophen (NORCO) 5-325 MG tablet     metoclopramide (REGLAN) 10 MG tablet     neomycin-polymyxin-hydrocortisone (CORTISPORIN) 3.5-63087-7 otic suspension     omeprazole (PRILOSEC) 40 MG DR capsule     ondansetron (ZOFRAN-ODT) 4 MG ODT tab     order for DME     propranolol (INDERAL) 20 MG tablet     QUEtiapine (SEROQUEL) 200 MG tablet     rOPINIRole (REQUIP) 0.5 MG tablet     simvastatin (ZOCOR) 10 MG tablet     zolpidem (AMBIEN) 5 MG tablet     No current facility-administered medications for this visit.        Allergies   Allergen Reactions     Cafergot      12-            GI problems-     Seasonal Allergies      Sumatriptan      vomits after giving herself a shot     Compazine Anxiety     Droperidol Anxiety     Nubain [Nalbuphine Hcl] Anxiety     Prochlorperazine Palpitations     Uncontrolled movement       Social History     Socioeconomic History     Marital status: Single     Spouse name: None     Number of children: None     Years of education: None     Highest education level: None   Occupational History     None   Social Needs     Financial resource strain: None     Food insecurity     Worry: None     Inability: None     Transportation needs     Medical: None     Non-medical: None   Tobacco Use     Smoking status: Current Every Day Smoker     Packs/day: 0.25     Years: 20.00     Pack years: 5.00     Types: Cigarettes     Smokeless tobacco: Never Used     Tobacco comment: 5-6 cigs daily   Substance and Sexual Activity  "    Alcohol use: Yes     Comment: occasional drinks     Drug use: No     Sexual activity: Yes     Partners: Male     Birth control/protection: Female Surgical   Lifestyle     Physical activity     Days per week: None     Minutes per session: None     Stress: None   Relationships     Social connections     Talks on phone: None     Gets together: None     Attends Restoration service: None     Active member of club or organization: None     Attends meetings of clubs or organizations: None     Relationship status: None     Intimate partner violence     Fear of current or ex partner: None     Emotionally abused: None     Physically abused: None     Forced sexual activity: None   Other Topics Concern     Parent/sibling w/ CABG, MI or angioplasty before 65F 55M? No   Social History Narrative     None       Family History   Problem Relation Age of Onset     Depression Mother      Respiratory Mother      Chronic Obstructive Pulmonary Disease Mother      Cerebrovascular Disease Father         Brain anyeurism     Adrenal Disorder Other      Chronic Obstructive Pulmonary Disease Other      Breast Cancer Cousin           ROS: 10 point ROS neg other than the symptoms noted above in the HPI.    Vital Signs: /84   Pulse 91   Temp 98  F (36.7  C) (Temporal)   Ht 5' 3.8\" (1.621 m)   Wt 152 lb (68.9 kg)   LMP  (LMP Unknown)   SpO2 97%   BMI 26.25 kg/m      Examination:  Constitutional:  Alert, well nourished, NAD.  HEENT: Normocephalic, atraumatic.   Pulmonary:  Without shortness of breath, normal effort.   Lymph: no lymphadenopathy to low back or LE.   Integumentary: Skin is free of rashes or lesions.   Cardiovascular:  No pitting edema of BLE.    Psych: Normal affect, no apparent distress    Neurological:  Awake  Alert  Oriented x 3  Speech clear  Cranial nerves II - XII grossly intact  Motor exam   Hip Flexor:                Right: 5/5  Left:  5/5  Hip Adductor:             Right:  5/5  Left:  5/5  Hip Abductor:          "    Right:  5/5  Left:  5/5  Gastroc Soleus:        Right:  5/5  Left:  5/5  Tib/Ant:                      Right:  5/5  Left:  5/5  EHL:                          Right:  5/5  Left:  5/5       Sensation normal to bilateral upper and lower extremities.    Musculoskeletal:  Gait: Able to stand from a seated position. Normal non-antalgic, non-myelopathic gait.    Imaging:   None recent    Assessment/Plan:     Low back pain  Left hip pain  cervicalgia    Radha Beckwith is a 45 year old female.  We did have a discussion regarding her history and symptoms.  She has ongoing low back and neck pain, worsened over the last several weeks while climbing a ladder at work.  She works at [employer].  She is also describing pain that radiates into her left hip.  This could be a radicular pain.  We will go ahead and get an updated MRI on her low back, as well as an MRI in her neck.  We anticipate referring her for physical therapy and possibly injections after the MRI is completed.  She voiced agreement and understanding.  I did give her a one-time prescription for some hydrocodone.  She understands that I am not able to provide refills on this.  She voiced agreement and understanding.          Wood Cooper PA-C  [facility] [place] [address]    Tel [phone]  Pager [phone]        Again, thank you for allowing me to participate in the care of your patient.        Sincerely,        Wood Cooper PA-C

## 2021-03-11 NOTE — PROGRESS NOTES
Dr. Azam Arteaga  [facility]  Neurosurgery Clinic Visit      CC: neck and back pain    Primary care Provider: Marisa Story    Referring Provider:  Marisa Story CNP      Reason For Visit:   I was asked to consult on the patient for neck and back pain.      HPI: Radha Beckwith is a 45 year old female who presents for evaluation of her chief complaint of neck and low back pain.  Symptoms have been gradually worsening for several years.  She experienced an event several weeks ago, when she was twisting while lifting heavy boxes, while up on a ladder.  She experienced sharp pain that extended from her lumbar spine up to her neck.  She also describes pain that radiates into her left hip.  She was recently seen, and placed on Cymbalta, which has helped with some of her hip pain.  She has not had any recent physical therapy or injections.  She states she had injections about 9 years ago, but they were not helpful.  She denies any bowel or bladder changes, or any problems with balance or coordination.    Past Medical History:   Diagnosis Date     Abdominal pain, right lower quadrant 03/09/08    Admit. Discharged 03/10/08     Anxiety attack 7/31/2015     Dehydration      Gastric ulcer 7/31/2015     GERD (gastroesophageal reflux disease) 7/28/2010    Takes omeprazole and metoclopramide      Opioid dependence in remission (H) 8/2/2015     Other and unspecified ovarian cyst      Papanicolaou smear of cervix with low grade squamous intraepithelial lesion (LGSIL) 07/07/2017       Past Medical History reviewed with patient during visit.    Past Surgical History:   Procedure Laterality Date     BIOPSY CERVICAL, LOCAL EXCISION, SINGLE/MULTIPLE N/A 8/10/2017    Procedure: BIOPSY CERVICAL, LOCAL EXCISION, SINGLE/MULTIPLE;;  Surgeon: Michael Chandler MD;  [facility]     COLPOSCOPY, BIOPSY, COMBINED N/A 8/10/2017    Procedure: COMBINED COLPOSCOPY, BIOPSY;  Colposcopy  with Cervical Biopsies and Endometrial Biopsy, Exam with Ultrasound;  Surgeon: Michael Chandler MD;  [facility]     ESOPHAGOSCOPY, GASTROSCOPY, DUODENOSCOPY (EGD), COMBINED N/A 4/17/2017    Procedure: COMBINED ESOPHAGOSCOPY, GASTROSCOPY, DUODENOSCOPY (EGD);  Surgeon: Ibrahima Esposito MD;  [facility]     EXAM UNDER ANESTHESIA PELVIC N/A 8/10/2017    Procedure: EXAM UNDER ANESTHESIA PELVIC;;  Surgeon: Michael Chandler MD;  [facility]     HYSTERECTOMY       HYSTERECTOMY, PAP NO LONGER INDICATED       LAPAROSCOPIC CHOLECYSTECTOMY N/A 2/2/2018    Procedure: LAPAROSCOPIC CHOLECYSTECTOMY;  Laparoscopic Cholecystectomy;  Surgeon: Tigre Lowry DO;  [facility]     LAPAROSCOPIC HYSTERECTOMY TOTAL N/A 10/30/2017    Procedure: LAPAROSCOPIC HYSTERECTOMY TOTAL;  LAPAROSCOPIC HYSTERECTOMY TOTAL POSSIBLE SALPINGO-OOPHERECTOMY (BILATERAL);  Surgeon: Michael Chandler MD;  [facility]     [facility] UGI ENDOSCOPY, SIMPLE EXAM  01/07/08     Past Surgical History reviewed with patient during visit.    Current Outpatient Medications   Medication     ACETAMINOPHEN PO     ALPRAZolam (XANAX) 0.5 MG tablet     buprenorphine HCl-naloxone HCl (SUBOXONE) 2-0.5 MG per film     buPROPion (WELLBUTRIN XL) 150 MG 24 hr tablet     carbamide peroxide (DEBROX) 6.5 % otic solution     cetirizine (ZYRTEC) 10 MG tablet     cyclobenzaprine (FLEXERIL) 5 MG tablet     DULoxetine (CYMBALTA) 20 MG capsule     famotidine (PEPCID) 40 MG tablet     HYDROcodone-acetaminophen (NORCO) 5-325 MG tablet     metoclopramide (REGLAN) 10 MG tablet     neomycin-polymyxin-hydrocortisone (CORTISPORIN) 3.5-71266-6 otic suspension     omeprazole (PRILOSEC) 40 MG DR capsule     ondansetron (ZOFRAN-ODT) 4 MG ODT tab     order for DME     propranolol (INDERAL) 20 MG tablet     QUEtiapine (SEROQUEL) 200 MG tablet     rOPINIRole (REQUIP) 0.5 MG tablet     simvastatin (ZOCOR) 10 MG tablet     zolpidem (AMBIEN) 5 MG tablet      No current facility-administered medications for this visit.        Allergies   Allergen Reactions     Cafergot      12-            GI problems-     Seasonal Allergies      Sumatriptan      vomits after giving herself a shot     Compazine Anxiety     Droperidol Anxiety     Nubain [Nalbuphine Hcl] Anxiety     Prochlorperazine Palpitations     Uncontrolled movement       Social History     Socioeconomic History     Marital status: Single     Spouse name: None     Number of children: None     Years of education: None     Highest education level: None   Occupational History     None   Social Needs     Financial resource strain: None     Food insecurity     Worry: None     Inability: None     Transportation needs     Medical: None     Non-medical: None   Tobacco Use     Smoking status: Current Every Day Smoker     Packs/day: 0.25     Years: 20.00     Pack years: 5.00     Types: Cigarettes     Smokeless tobacco: Never Used     Tobacco comment: 5-6 cigs daily   Substance and Sexual Activity     Alcohol use: Yes     Comment: occasional drinks     Drug use: No     Sexual activity: Yes     Partners: Male     Birth control/protection: Female Surgical   Lifestyle     Physical activity     Days per week: None     Minutes per session: None     Stress: None   Relationships     Social connections     Talks on phone: None     Gets together: None     Attends Latter-day service: None     Active member of club or organization: None     Attends meetings of clubs or organizations: None     Relationship status: None     Intimate partner violence     Fear of current or ex partner: None     Emotionally abused: None     Physically abused: None     Forced sexual activity: None   Other Topics Concern     Parent/sibling w/ CABG, MI or angioplasty before 65F 55M? No   Social History Narrative     None       Family History   Problem Relation Age of Onset     Depression Mother      Respiratory Mother      Chronic Obstructive Pulmonary  "Disease Mother      Cerebrovascular Disease Father         Brain anyeurism     Adrenal Disorder Other      Chronic Obstructive Pulmonary Disease Other      Breast Cancer Cousin           ROS: 10 point ROS neg other than the symptoms noted above in the HPI.    Vital Signs: /84   Pulse 91   Temp 98  F (36.7  C) (Temporal)   Ht 5' 3.8\" (1.621 m)   Wt 152 lb (68.9 kg)   LMP  (LMP Unknown)   SpO2 97%   BMI 26.25 kg/m      Examination:  Constitutional:  Alert, well nourished, NAD.  HEENT: Normocephalic, atraumatic.   Pulmonary:  Without shortness of breath, normal effort.   Lymph: no lymphadenopathy to low back or LE.   Integumentary: Skin is free of rashes or lesions.   Cardiovascular:  No pitting edema of BLE.    Psych: Normal affect, no apparent distress    Neurological:  Awake  Alert  Oriented x 3  Speech clear  Cranial nerves II - XII grossly intact  Motor exam   Hip Flexor:                Right: 5/5  Left:  5/5  Hip Adductor:             Right:  5/5  Left:  5/5  Hip Abductor:             Right:  5/5  Left:  5/5  Gastroc Soleus:        Right:  5/5  Left:  5/5  Tib/Ant:                      Right:  5/5  Left:  5/5  EHL:                          Right:  5/5  Left:  5/5       Sensation normal to bilateral upper and lower extremities.    Musculoskeletal:  Gait: Able to stand from a seated position. Normal non-antalgic, non-myelopathic gait.    Imaging:   None recent    Assessment/Plan:     Low back pain  Left hip pain  cervicalgia    Radha Beckwith is a 45 year old female.  We did have a discussion regarding her history and symptoms.  She has ongoing low back and neck pain, worsened over the last several weeks while climbing a ladder at work.  She works at [employer].  She is also describing pain that radiates into her left hip.  This could be a radicular pain.  We will go ahead and get an updated MRI on her low back, as well as an MRI in her neck.  We anticipate referring her for physical " therapy and possibly injections after the MRI is completed.  She voiced agreement and understanding.  I did give her a one-time prescription for some hydrocodone.  She understands that I am not able to provide refills on this.  She voiced agreement and understanding.          Wood Cooper PA-C  [facility]  [address] [address]    Tel [phone]  Pager [phone]

## 2021-03-11 NOTE — PROGRESS NOTES
"Radha Beckwith is a 45 year old female who presents for:  Chief Complaint   Patient presents with     Neurologic Problem     Bilateral back and cervical pain         Initial Vitals:  /84   Pulse 91   Temp 98  F (36.7  C) (Temporal)   Ht 5' 3.8\" (1.621 m)   Wt 152 lb (68.9 kg)   LMP  (LMP Unknown)   SpO2 97%   BMI 26.25 kg/m   Estimated body mass index is 26.25 kg/m  as calculated from the following:    Height as of this encounter: 5' 3.8\" (1.621 m).    Weight as of this encounter: 152 lb (68.9 kg).. Body surface area is 1.76 meters squared. BP completed using cuff size: regular  Severe Pain (7)    Nursing Comments:     Halley Meyer CMA    "

## 2021-03-18 ENCOUNTER — TELEPHONE (OUTPATIENT)
Dept: ORTHOPEDICS | Facility: OTHER | Age: 46
End: 2021-03-18

## 2021-03-18 DIAGNOSIS — M54.16 LUMBAR RADICULOPATHY: Primary | ICD-10-CM

## 2021-03-18 NOTE — TELEPHONE ENCOUNTER
Called patient in preparation of her 3/22/2021 appointment with Dr. Akins  (for her left hip) wanting to find out where she had gotten her lumbar MRI done at. She stated that she has not had her lumbar or cervical MRIs done yet because she had not been contacted to schedule them. I am unable to find any referrals for MRIs in the system. Please assist. Thank you. Frida Hill, ATC

## 2021-03-22 ENCOUNTER — TELEPHONE (OUTPATIENT)
Dept: NEUROSURGERY | Facility: CLINIC | Age: 46
End: 2021-03-22

## 2021-03-22 DIAGNOSIS — M54.16 LUMBAR RADICULOPATHY: Primary | ICD-10-CM

## 2021-03-22 NOTE — TELEPHONE ENCOUNTER
"Per Wood Cooper PA-C, \"Yes. Go ahead and order both.\"    Both MRIs ordered. LVM regarding MRIs and phone number to get them scheduled.   "

## 2021-03-31 ENCOUNTER — HOSPITAL ENCOUNTER (OUTPATIENT)
Dept: MRI IMAGING | Facility: CLINIC | Age: 46
End: 2021-03-31
Attending: PHYSICIAN ASSISTANT
Payer: COMMERCIAL

## 2021-03-31 DIAGNOSIS — M54.16 LUMBAR RADICULOPATHY: ICD-10-CM

## 2021-03-31 PROCEDURE — 72141 MRI NECK SPINE W/O DYE: CPT

## 2021-03-31 PROCEDURE — 72148 MRI LUMBAR SPINE W/O DYE: CPT

## 2021-04-07 ENCOUNTER — HOSPITAL ENCOUNTER (OUTPATIENT)
Facility: CLINIC | Age: 46
End: 2021-04-07
Attending: ANESTHESIOLOGY | Admitting: ANESTHESIOLOGY
Payer: COMMERCIAL

## 2021-04-07 ENCOUNTER — TELEPHONE (OUTPATIENT)
Dept: NEUROSURGERY | Facility: CLINIC | Age: 46
End: 2021-04-07

## 2021-04-07 ENCOUNTER — TELEPHONE (OUTPATIENT)
Dept: SURGERY | Facility: CLINIC | Age: 46
End: 2021-04-07

## 2021-04-07 DIAGNOSIS — M54.16 LUMBAR RADICULOPATHY: Primary | ICD-10-CM

## 2021-04-07 NOTE — TELEPHONE ENCOUNTER
Called patient and reviewed MRI results. Patient agreeable to JASE and course of PT. Orders placed. Dr. Sanchez team sent staff message.

## 2021-04-07 NOTE — TELEPHONE ENCOUNTER
"Per Wood Cooper PA-C, \"She does have some lateral recess narrowing on the left at L4-5 and L5-S1. We could order her an L4-5 left paracentral translaminar JASE if she would like. We should also ensure she has been referred to PT.\"    LVM and encouraged pt to call back and discuss further.   "

## 2021-04-07 NOTE — TELEPHONE ENCOUNTER
Pt scheduled for Injection   Date:4/30/21  Time:3pm  Dr. Ivory    Instructed pt to have H&P and  for procedure.  Patient informed of COVID testing process.

## 2021-04-14 DIAGNOSIS — F41.0 PANIC ATTACKS: ICD-10-CM

## 2021-04-15 RX ORDER — ALPRAZOLAM 0.5 MG
TABLET ORAL
Qty: 60 TABLET | Refills: 0 | Status: SHIPPED | OUTPATIENT
Start: 2021-04-15 | End: 2021-05-18

## 2021-04-15 NOTE — TELEPHONE ENCOUNTER
Pending Prescriptions:                       Disp   Refills    ALPRAZolam (XANAX) 0.5 MG tablet [Pharmacy*60 tab*0        Sig: TAKE ONE TABLET BY MOUTH TWO TIMES A DAY AS NEEDED           FOR PANIC ATTACKS (USE SPARINGLY)        Routing refill request to provider for review/approval because:  Drug not on the [facility] refill protocol   Last Seen: 03/02/21  Last Refilled: 03/2/21   Next Visit: ANGELA Stewart RN, BSN  [place]  April 15, 2021

## 2021-04-19 DIAGNOSIS — Z11.59 ENCOUNTER FOR SCREENING FOR OTHER VIRAL DISEASES: ICD-10-CM

## 2021-04-23 ENCOUNTER — MYC MEDICAL ADVICE (OUTPATIENT)
Dept: NEUROSURGERY | Facility: CLINIC | Age: 46
End: 2021-04-23

## 2021-04-23 NOTE — TELEPHONE ENCOUNTER
"Per Marlen Gonzalez, : Pt needs to have PT trial before lumbar injection. Per Kenneth Muir \" will have to get him in with PT first\". Cancelled injection and informed pt about plan of care.   "

## 2021-05-03 DIAGNOSIS — F41.9 ANXIETY: ICD-10-CM

## 2021-05-03 DIAGNOSIS — R11.0 NAUSEA: ICD-10-CM

## 2021-05-12 RX ORDER — ONDANSETRON 4 MG/1
TABLET, ORALLY DISINTEGRATING ORAL
Qty: 20 TABLET | Refills: 2 | Status: SHIPPED | OUTPATIENT
Start: 2021-05-12 | End: 2021-09-15

## 2021-05-12 NOTE — TELEPHONE ENCOUNTER
This has been pending since 05/03/21??    Thanks  Marcella Dodge [facility]   [phone]

## 2021-05-14 DIAGNOSIS — F41.0 PANIC ATTACKS: ICD-10-CM

## 2021-05-14 NOTE — TELEPHONE ENCOUNTER
Pending Prescriptions:                       Disp   Refills    ALPRAZolam (XANAX) 0.5 MG tablet [Pharmacy*60 tab*0        Sig: TAKE ONE TABLET BY MOUTH TWO TIMES A DAY AS NEEDED           FOR PANIC ATTACKS (USE SPARINGLY)        Routing refill request to provider for review/approval because:  Drug not on the [facility] refill protocol   Last Refill: 04/15/21  Wandy Stewart RN, BSN  [place]  May 14, 2021

## 2021-05-18 RX ORDER — ALPRAZOLAM 0.5 MG
TABLET ORAL
Qty: 60 TABLET | Refills: 0 | Status: SHIPPED | OUTPATIENT
Start: 2021-05-18 | End: 2021-06-18

## 2021-06-18 DIAGNOSIS — F41.0 PANIC ATTACKS: ICD-10-CM

## 2021-06-18 RX ORDER — ALPRAZOLAM 0.5 MG
TABLET ORAL
Qty: 60 TABLET | Refills: 0 | Status: SHIPPED | OUTPATIENT
Start: 2021-06-18 | End: 2021-07-16

## 2021-06-18 NOTE — TELEPHONE ENCOUNTER
Pending Prescriptions:                       Disp   Refills    ALPRAZolam (XANAX) 0.5 MG tablet [Pharmacy*60 tab*0        Sig: TAKE ONE TABLET BY MOUTH TWO TIMES A DAY AS NEEDED           FOR PANIC ATTACKS (USE SPARINGLY)    Routing refill request to provider for review/approval because:  Drug not on the [facility] refill protocol

## 2021-07-06 ENCOUNTER — HOSPITAL ENCOUNTER (EMERGENCY)
Facility: CLINIC | Age: 46
Discharge: HOME | End: 2021-07-06
Attending: FAMILY MEDICINE | Admitting: FAMILY MEDICINE
Payer: COMMERCIAL

## 2021-07-06 ENCOUNTER — APPOINTMENT (OUTPATIENT)
Dept: CT IMAGING | Facility: CLINIC | Age: 46
End: 2021-07-06
Attending: FAMILY MEDICINE
Payer: COMMERCIAL

## 2021-07-06 VITALS
RESPIRATION RATE: 18 BRPM | WEIGHT: 156.2 LBS | TEMPERATURE: 98.6 F | BODY MASS INDEX: 26.98 KG/M2 | DIASTOLIC BLOOD PRESSURE: 78 MMHG | OXYGEN SATURATION: 98 % | SYSTOLIC BLOOD PRESSURE: 114 MMHG | HEART RATE: 70 BPM

## 2021-07-06 DIAGNOSIS — R10.31 RLQ ABDOMINAL PAIN: ICD-10-CM

## 2021-07-06 LAB
ALBUMIN SERPL-MCNC: 3.8 G/DL (ref 3.4–5)
ALBUMIN UR-MCNC: NEGATIVE MG/DL
ALP SERPL-CCNC: 95 U/L (ref 40–150)
ALT SERPL W P-5'-P-CCNC: 34 U/L (ref 0–50)
ANION GAP SERPL CALCULATED.3IONS-SCNC: 1 MMOL/L (ref 3–14)
APPEARANCE UR: ABNORMAL
AST SERPL W P-5'-P-CCNC: 33 U/L (ref 0–45)
BACTERIA #/AREA URNS HPF: ABNORMAL /HPF
BASOPHILS # BLD AUTO: 0 10E9/L (ref 0–0.2)
BASOPHILS NFR BLD AUTO: 0.4 %
BILIRUB SERPL-MCNC: 0.5 MG/DL (ref 0.2–1.3)
BILIRUB UR QL STRIP: ABNORMAL
BUN SERPL-MCNC: 10 MG/DL (ref 7–30)
CALCIUM SERPL-MCNC: 8.9 MG/DL (ref 8.5–10.1)
CHLORIDE SERPL-SCNC: 104 MMOL/L (ref 94–109)
CO2 SERPL-SCNC: 31 MMOL/L (ref 20–32)
COLOR UR AUTO: YELLOW
CREAT SERPL-MCNC: 0.75 MG/DL (ref 0.52–1.04)
DIFFERENTIAL METHOD BLD: NORMAL
EOSINOPHIL # BLD AUTO: 0.2 10E9/L (ref 0–0.7)
EOSINOPHIL NFR BLD AUTO: 2.1 %
ERYTHROCYTE [DISTWIDTH] IN BLOOD BY AUTOMATED COUNT: 12.5 % (ref 10–15)
GFR SERPL CREATININE-BSD FRML MDRD: >90 ML/MIN/{1.73_M2}
GLUCOSE SERPL-MCNC: 111 MG/DL (ref 70–99)
GLUCOSE UR STRIP-MCNC: NEGATIVE MG/DL
HCT VFR BLD AUTO: 40.7 % (ref 35–47)
HGB BLD-MCNC: 13.6 G/DL (ref 11.7–15.7)
HGB UR QL STRIP: NEGATIVE
HYALINE CASTS #/AREA URNS LPF: 3 /LPF (ref 0–2)
IMM GRANULOCYTES # BLD: 0 10E9/L (ref 0–0.4)
IMM GRANULOCYTES NFR BLD: 0.4 %
KETONES UR STRIP-MCNC: 5 MG/DL
LEUKOCYTE ESTERASE UR QL STRIP: NEGATIVE
LIPASE SERPL-CCNC: 59 U/L (ref 73–393)
LYMPHOCYTES # BLD AUTO: 2.2 10E9/L (ref 0.8–5.3)
LYMPHOCYTES NFR BLD AUTO: 21.7 %
MCH RBC QN AUTO: 30.6 PG (ref 26.5–33)
MCHC RBC AUTO-ENTMCNC: 33.4 G/DL (ref 31.5–36.5)
MCV RBC AUTO: 92 FL (ref 78–100)
MONOCYTES # BLD AUTO: 0.5 10E9/L (ref 0–1.3)
MONOCYTES NFR BLD AUTO: 4.5 %
MUCOUS THREADS #/AREA URNS LPF: PRESENT /LPF
NEUTROPHILS # BLD AUTO: 7.2 10E9/L (ref 1.6–8.3)
NEUTROPHILS NFR BLD AUTO: 70.9 %
NITRATE UR QL: NEGATIVE
NRBC # BLD AUTO: 0 10*3/UL
NRBC BLD AUTO-RTO: 0 /100
PH UR STRIP: 7 PH (ref 5–7)
PLATELET # BLD AUTO: 210 10E9/L (ref 150–450)
POTASSIUM SERPL-SCNC: 3.9 MMOL/L (ref 3.4–5.3)
PROT SERPL-MCNC: 7.6 G/DL (ref 6.8–8.8)
RBC # BLD AUTO: 4.44 10E12/L (ref 3.8–5.2)
RBC #/AREA URNS AUTO: 1 /HPF (ref 0–2)
SODIUM SERPL-SCNC: 136 MMOL/L (ref 133–144)
SOURCE: ABNORMAL
SP GR UR STRIP: 1.02 (ref 1–1.03)
SQUAMOUS #/AREA URNS AUTO: 3 /HPF (ref 0–1)
UROBILINOGEN UR STRIP-MCNC: 4 MG/DL (ref 0–2)
WBC # BLD AUTO: 10.2 10E9/L (ref 4–11)
WBC #/AREA URNS AUTO: 1 /HPF (ref 0–5)

## 2021-07-06 PROCEDURE — 74177 CT ABD & PELVIS W/CONTRAST: CPT

## 2021-07-06 PROCEDURE — 80053 COMPREHEN METABOLIC PANEL: CPT | Performed by: FAMILY MEDICINE

## 2021-07-06 PROCEDURE — 250N000009 HC RX 250: Performed by: RADIOLOGY

## 2021-07-06 PROCEDURE — 96375 TX/PRO/DX INJ NEW DRUG ADDON: CPT | Performed by: FAMILY MEDICINE

## 2021-07-06 PROCEDURE — 99285 EMERGENCY DEPT VISIT HI MDM: CPT | Mod: 25 | Performed by: FAMILY MEDICINE

## 2021-07-06 PROCEDURE — 250N000011 HC RX IP 250 OP 636: Performed by: FAMILY MEDICINE

## 2021-07-06 PROCEDURE — 83690 ASSAY OF LIPASE: CPT | Performed by: FAMILY MEDICINE

## 2021-07-06 PROCEDURE — 81001 URINALYSIS AUTO W/SCOPE: CPT | Performed by: FAMILY MEDICINE

## 2021-07-06 PROCEDURE — 96374 THER/PROPH/DIAG INJ IV PUSH: CPT | Mod: 59 | Performed by: FAMILY MEDICINE

## 2021-07-06 PROCEDURE — 250N000011 HC RX IP 250 OP 636: Performed by: RADIOLOGY

## 2021-07-06 PROCEDURE — 99285 EMERGENCY DEPT VISIT HI MDM: CPT | Performed by: FAMILY MEDICINE

## 2021-07-06 PROCEDURE — 85025 COMPLETE CBC W/AUTO DIFF WBC: CPT | Performed by: FAMILY MEDICINE

## 2021-07-06 RX ORDER — PHENOL 1.4 %
10 AEROSOL, SPRAY (ML) MUCOUS MEMBRANE
COMMUNITY
End: 2023-05-23

## 2021-07-06 RX ORDER — VIT C/B6/B5/MAGNESIUM/HERB 173 50-5-6-5MG
500 CAPSULE ORAL EVERY MORNING
COMMUNITY
End: 2021-09-28

## 2021-07-06 RX ORDER — KETOROLAC TROMETHAMINE 30 MG/ML
30 INJECTION, SOLUTION INTRAMUSCULAR; INTRAVENOUS ONCE
Status: COMPLETED | OUTPATIENT
Start: 2021-07-06 | End: 2021-07-06

## 2021-07-06 RX ORDER — HYDROMORPHONE HYDROCHLORIDE 1 MG/ML
0.5 INJECTION, SOLUTION INTRAMUSCULAR; INTRAVENOUS; SUBCUTANEOUS
Status: DISCONTINUED | OUTPATIENT
Start: 2021-07-06 | End: 2021-07-06 | Stop reason: HOSPADM

## 2021-07-06 RX ORDER — BUPRENORPHINE AND NALOXONE 2; .5 MG/1; MG/1
0.5 FILM, SOLUBLE BUCCAL; SUBLINGUAL DAILY
COMMUNITY
Start: 2021-05-25

## 2021-07-06 RX ORDER — ACETAMINOPHEN 500 MG
1000 TABLET ORAL EVERY 6 HOURS PRN
COMMUNITY

## 2021-07-06 RX ORDER — METHYLPREDNISOLONE 4 MG
TABLET, DOSE PACK ORAL
Qty: 21 TABLET | Refills: 0 | Status: SHIPPED | OUTPATIENT
Start: 2021-07-06 | End: 2021-10-05

## 2021-07-06 RX ORDER — LACTULOSE 10 G/15ML
10 SOLUTION ORAL EVERY 6 HOURS PRN
COMMUNITY
Start: 2021-05-25 | End: 2023-07-05

## 2021-07-06 RX ORDER — IOPAMIDOL 755 MG/ML
500 INJECTION, SOLUTION INTRAVASCULAR ONCE
Status: COMPLETED | OUTPATIENT
Start: 2021-07-06 | End: 2021-07-06

## 2021-07-06 RX ADMIN — SODIUM CHLORIDE 60 ML: 9 INJECTION, SOLUTION INTRAVENOUS at 12:23

## 2021-07-06 RX ADMIN — IOPAMIDOL 77 ML: 755 INJECTION, SOLUTION INTRAVENOUS at 12:23

## 2021-07-06 RX ADMIN — KETOROLAC TROMETHAMINE 30 MG: 30 INJECTION, SOLUTION INTRAMUSCULAR; INTRAVENOUS at 12:07

## 2021-07-06 RX ADMIN — HYDROMORPHONE HYDROCHLORIDE 0.5 MG: 1 INJECTION, SOLUTION INTRAMUSCULAR; INTRAVENOUS; SUBCUTANEOUS at 13:22

## 2021-07-06 NOTE — DISCHARGE INSTRUCTIONS
1.  I want you to  some over-the-counter MiraLAX and mix 2-3 capfuls in a 20 ounce bottle of Gatorade.  Mix this very well.  I want you then to drink 2 to 3 ounces every 15 minutes until this is gone.  This should help get your bowels cleaned out.   no

## 2021-07-06 NOTE — ED PROVIDER NOTES
History     Chief Complaint   Patient presents with     Abdominal Pain     HPI  Radha Beckwith is a 45 year old female who presents with right lower quadrant abdominal pain that started 2 to 3 days ago.  Pain initially started, more central upper and now has localized to the right lower quadrant.  Denies any nausea any vomiting.  Patient has had some subjective fevers and chills.  Patient denies any diarrhea or change in her bowel movements.  Denies any dysuria or hematuria.  Patient denies any back pain.  Patient states that when she was sitting today she felt like her leg was going numb from the pain.    Allergies:  Allergies   Allergen Reactions     [facility]      12-            GI problems-     Seasonal Allergies      Sumatriptan      vomits after giving herself a shot     Compazine Anxiety     Droperidol Anxiety     Nubain [Nalbuphine Hcl] Anxiety     Prochlorperazine Palpitations     Uncontrolled movement       Problem List:    Patient Active Problem List    Diagnosis Date Noted     Panic attacks 12/23/2019     Priority: Medium     Situational depression 12/23/2019     Priority: Medium     Moderate major depression (H) 06/03/2019     Priority: Medium     Supraventricular tachycardia (H) 06/03/2019     Priority: Medium     S/P hysterectomy 12/30/2017     Priority: Medium     Psychophysiological insomnia 12/30/2017     Priority: Medium     Chronic bilateral low back pain without sciatica 12/30/2017     Priority: Medium     Papanicolaou smear of cervix with low grade squamous intraepithelial lesion (LGSIL) 07/07/2017     Priority: Medium     7/7/17 LSIL pap/+ HR HPV (not 16 or 18). Plan: colp bef 10/7/17.   8/10/17 [place]:Focal squamous atypia; cannot exclude koilocytosis   Plan: hysterectomy.  10/30/17 Hysterectomy surgery  01/09/19 Patient is lost to pap tracking follow-up         Opioid dependence in remission (H) 08/02/2015     Priority: Medium     On suboxone treatement with Garland Rodriguez MD,  [place].  (Noted in 2015).  Has been off suboxone since at least June of 2017. 8/9/2017        Anxiety attack 07/31/2015     Priority: Medium     Hypokalemia 06/23/2015     Priority: Medium     Atypical chest pain 06/23/2015     Priority: Medium     Tobacco abuse 06/23/2015     Priority: Medium     Hyperlipidemia LDL goal <100 01/29/2014     Priority: Medium     Ingrowing nail 01/09/2014     Priority: Medium     Anxiety 08/19/2013     Priority: Medium     GERD (gastroesophageal reflux disease) 07/28/2010     Priority: Medium     Takes omeprazole and metoclopramide       Restless legs 07/28/2010     Priority: Medium        Past Medical History:    Past Medical History:   Diagnosis Date     Abdominal pain, right lower quadrant 03/09/08     Anxiety attack 7/31/2015     Dehydration      Gastric ulcer 7/31/2015     GERD (gastroesophageal reflux disease) 7/28/2010     Opioid dependence in remission (H) 8/2/2015     Other and unspecified ovarian cyst      Papanicolaou smear of cervix with low grade squamous intraepithelial lesion (LGSIL) 07/07/2017       Past Surgical History:    Past Surgical History:   Procedure Laterality Date     BIOPSY CERVICAL, LOCAL EXCISION, SINGLE/MULTIPLE N/A 8/10/2017    Procedure: BIOPSY CERVICAL, LOCAL EXCISION, SINGLE/MULTIPLE;;  Surgeon: Michael Chandler MD;  Location: [facility]     COLPOSCOPY, BIOPSY, COMBINED N/A 8/10/2017    Procedure: COMBINED COLPOSCOPY, BIOPSY;  Colposcopy with Cervical Biopsies and Endometrial Biopsy, Exam with Ultrasound;  Surgeon: Michael Chandler MD;  Location: [facility] OR     ESOPHAGOSCOPY, GASTROSCOPY, DUODENOSCOPY (EGD), COMBINED N/A 4/17/2017    Procedure: COMBINED ESOPHAGOSCOPY, GASTROSCOPY, DUODENOSCOPY (EGD);  Surgeon: Ibrahima Esposito MD;  Location: [facility] GI     EXAM UNDER ANESTHESIA PELVIC N/A 8/10/2017    Procedure: EXAM UNDER ANESTHESIA PELVIC;;  Surgeon: Michael Chandler MD;  Location: [facility] OR     HYSTERECTOMY       HYSTERECTOMY, PAP  NO LONGER INDICATED       LAPAROSCOPIC CHOLECYSTECTOMY N/A 2/2/2018    Procedure: LAPAROSCOPIC CHOLECYSTECTOMY;  Laparoscopic Cholecystectomy;  Surgeon: Tigre Lowry DO;  Location: [facility]     LAPAROSCOPIC HYSTERECTOMY TOTAL N/A 10/30/2017    Procedure: LAPAROSCOPIC HYSTERECTOMY TOTAL;  LAPAROSCOPIC HYSTERECTOMY TOTAL POSSIBLE SALPINGO-OOPHERECTOMY (BILATERAL);  Surgeon: Michael Chandler MD;  Location:  OR     [facility] UGI ENDOSCOPY, SIMPLE EXAM  01/07/08       Family History:    Family History   Problem Relation Age of Onset     Depression Mother      Respiratory Mother      Chronic Obstructive Pulmonary Disease Mother      Cerebrovascular Disease Father         Brain anyeurism     Adrenal Disorder Other      Chronic Obstructive Pulmonary Disease Other      Breast Cancer Cousin        Social History:  Marital Status:  Single [1]  Social History     Tobacco Use     Smoking status: Current Every Day Smoker     Packs/day: 0.50     Years: 20.00     Pack years: 10.00     Types: Cigarettes     Smokeless tobacco: Never Used   Substance Use Topics     Alcohol use: Yes     Comment: occasional drinks     Drug use: No        Medications:    ACETAMINOPHEN PO  ALPRAZolam (XANAX) 0.5 MG tablet  buprenorphine HCl-naloxone HCl (SUBOXONE) 2-0.5 MG per film  buPROPion (WELLBUTRIN XL) 150 MG 24 hr tablet  carbamide peroxide (DEBROX) 6.5 % otic solution  cetirizine (ZYRTEC) 10 MG tablet  cyclobenzaprine (FLEXERIL) 5 MG tablet  DULoxetine (CYMBALTA) 20 MG capsule  famotidine (PEPCID) 40 MG tablet  HYDROcodone-acetaminophen (NORCO) 5-325 MG tablet  metoclopramide (REGLAN) 10 MG tablet  neomycin-polymyxin-hydrocortisone (CORTISPORIN) 3.5-16797-2 otic suspension  omeprazole (PRILOSEC) 40 MG DR capsule  ondansetron (ZOFRAN-ODT) 4 MG ODT tab  order for DME  propranolol (INDERAL) 20 MG tablet  QUEtiapine (SEROQUEL) 200 MG tablet  rOPINIRole (REQUIP) 0.5 MG tablet  simvastatin (ZOCOR) 10 MG tablet  zolpidem (AMBIEN) 5 MG  tablet          Review of Systems   All other systems reviewed and are negative.      Physical Exam   BP: 127/88  Pulse: 83  Temp: 98.6  F (37  C)  Resp: 16  Weight: 70.9 kg (156 lb 3.2 oz)  SpO2: 99 %      Physical Exam  Vitals signs and nursing note reviewed.   Constitutional:       General: She is not in acute distress.     Appearance: She is well-developed. She is not diaphoretic.   Eyes:      Conjunctiva/sclera: Conjunctivae normal.   Neck:      Musculoskeletal: Normal range of motion and neck supple.   Cardiovascular:      Rate and Rhythm: Normal rate and regular rhythm.      Heart sounds: Normal heart sounds. No murmur. No friction rub. No gallop.    Pulmonary:      Effort: Pulmonary effort is normal. No respiratory distress.      Breath sounds: Normal breath sounds. No wheezing or rales.   Chest:      Chest wall: No tenderness.   Abdominal:      General: Bowel sounds are normal. There is no distension.      Palpations: Abdomen is soft. There is no mass.      Tenderness: There is abdominal tenderness in the right lower quadrant. There is guarding. Positive signs include obturator sign.   Musculoskeletal: Normal range of motion.         General: No tenderness.   Skin:     General: Skin is warm and dry.      Findings: No rash.   Neurological:      Mental Status: She is alert and oriented to person, place, and time.   Psychiatric:         Judgment: Judgment normal.         ED Course        Procedures      Results for orders placed or performed during the hospital encounter of 07/06/21 (from the past 24 hour(s))   CBC with platelets differential   Result Value Ref Range    WBC 10.2 4.0 - 11.0 10e9/L    RBC Count 4.44 3.8 - 5.2 10e12/L    Hemoglobin 13.6 11.7 - 15.7 g/dL    Hematocrit 40.7 35.0 - 47.0 %    MCV 92 78 - 100 fl    MCH 30.6 26.5 - 33.0 pg    MCHC 33.4 31.5 - 36.5 g/dL    RDW 12.5 10.0 - 15.0 %    Platelet Count 210 150 - 450 10e9/L    Diff Method Automated Method     % Neutrophils 70.9 %    %  Lymphocytes 21.7 %    % Monocytes 4.5 %    % Eosinophils 2.1 %    % Basophils 0.4 %    % Immature Granulocytes 0.4 %    Nucleated RBCs 0 0 /100    Absolute Neutrophil 7.2 1.6 - 8.3 10e9/L    Absolute Lymphocytes 2.2 0.8 - 5.3 10e9/L    Absolute Monocytes 0.5 0.0 - 1.3 10e9/L    Absolute Eosinophils 0.2 0.0 - 0.7 10e9/L    Absolute Basophils 0.0 0.0 - 0.2 10e9/L    Abs Immature Granulocytes 0.0 0 - 0.4 10e9/L    Absolute Nucleated RBC 0.0    Comprehensive metabolic panel   Result Value Ref Range    Sodium 136 133 - 144 mmol/L    Potassium 3.9 3.4 - 5.3 mmol/L    Chloride 104 94 - 109 mmol/L    Carbon Dioxide 31 20 - 32 mmol/L    Anion Gap 1 (L) 3 - 14 mmol/L    Glucose 111 (H) 70 - 99 mg/dL    Urea Nitrogen 10 7 - 30 mg/dL    Creatinine 0.75 0.52 - 1.04 mg/dL    GFR Estimate >90 >60 mL/min/[1.73_m2]    GFR Estimate If Black >90 >60 mL/min/[1.73_m2]    Calcium 8.9 8.5 - 10.1 mg/dL    Bilirubin Total 0.5 0.2 - 1.3 mg/dL    Albumin 3.8 3.4 - 5.0 g/dL    Protein Total 7.6 6.8 - 8.8 g/dL    Alkaline Phosphatase 95 40 - 150 U/L    ALT 34 0 - 50 U/L    AST 33 0 - 45 U/L   Lipase   Result Value Ref Range    Lipase 59 (L) 73 - 393 U/L   UA reflex to Microscopic and Culture    Specimen: Midstream Urine   Result Value Ref Range    Color Urine Yellow     Appearance Urine Slightly Cloudy     Glucose Urine Negative NEG^Negative mg/dL    Bilirubin Urine Small (A) NEG^Negative    Ketones Urine 5 (A) NEG^Negative mg/dL    Specific Gravity Urine 1.025 1.003 - 1.035    Blood Urine Negative NEG^Negative    pH Urine 7.0 5.0 - 7.0 pH    Protein Albumin Urine Negative NEG^Negative mg/dL    Urobilinogen mg/dL 4.0 (H) 0.0 - 2.0 mg/dL    Nitrite Urine Negative NEG^Negative    Leukocyte Esterase Urine Negative NEG^Negative    Source Midstream Urine     RBC Urine 1 0 - 2 /HPF    WBC Urine 1 0 - 5 /HPF    Bacteria Urine Few (A) NEG^Negative /HPF    Squamous Epithelial /HPF Urine 3 (H) 0 - 1 /HPF    Mucous Urine Present (A) NEG^Negative /LPF     Hyaline Casts 3 (H) 0 - 2 /LPF   CT Abdomen Pelvis w Contrast    Narrative    CT ABDOMEN AND PELVIS WITH CONTRAST 7/6/2021 12:36 PM    CLINICAL HISTORY: Right lower quadrant abdominal pain and guarding.  History of hysterectomy and cholecystectomy.    TECHNIQUE: CT scan of the abdomen and pelvis was performed following  injection of IV contrast. Multiplanar reformats were obtained. Dose  reduction techniques were used.    CONTRAST: 77 mL Isovue-370.    COMPARISON: CT abdomen and pelvis dated 12/21/2017.    FINDINGS:   LOWER CHEST: Groundglass density posterior aspects of the bilateral  visualized lung bases likely represents atelectasis. Visualized  portions of the lung bases and mediastinal contents are otherwise  grossly unremarkable.    HEPATOBILIARY: Patient is status post cholecystectomy. The liver  enhances normally. No biliary ductal dilatation or choledocholithiasis  is seen.    PANCREAS: Normal.    SPLEEN: A few scattered calcifications in the spleen indicate chronic  granulomatous disease and are stable since the prior study. Spleen  otherwise enhances normally.    ADRENAL GLANDS: Normal.    KIDNEYS/BLADDER: Minimal scarring anterior cortex of the right mid  kidney. This is stable since the prior study. The kidneys otherwise  enhance normally. No hydronephrosis, nephrolithiasis, hydroureter, or  ureteral calculus is identified. Urinary bladder is mostly  decompressed but otherwise unremarkable.    BOWEL: There is a moderate amount of stool in the colon. No  pericolonic inflammatory change to suggest acute diverticulitis. The  colon is grossly of normal caliber. Structure consistent with a normal  appendix extends posteriorly and laterally from the cecum. No  inflammatory changes in this region to suggest appendicitis. Small  bowel is grossly of normal caliber and appearance. Stomach contains a  moderate amount of fluid and air but is otherwise unremarkable.    PELVIC ORGANS: Uterus is not seen, consistent  with surgical history.  Subtle soft tissue structures in the adnexal regions could represent  atrophic ovaries. No definite adnexal mass is identified.    ADDITIONAL FINDINGS: No adenopathy, free fluid, or free air is seen in  the peritoneal cavity. Aorta is grossly normal in appearance.    MUSCULOSKELETAL: No aggressive osseous lesions or acute osseous  fractures are identified.      Impression    IMPRESSION:   1.  No evidence for diverticulitis, appendicitis, urinary system  calculus or urinary system obstruction to suggest etiology for  patient's right lower quadrant abdominal pain.  2.  Postoperative changes status post cholecystectomy and  hysterectomy. Subtle soft tissue structures in the adnexal regions  could represent small/atrophic ovaries.  3.  Moderate amount of stool in the colon.  4.  Etiology for patient's symptoms is not definitely identified.       Medications   HYDROmorphone (PF) (DILAUDID) injection 0.5 mg (0.5 mg Intravenous Given 7/6/21 1322)   ketorolac (TORADOL) injection 30 mg (30 mg Intravenous Given 7/6/21 1207)   sodium chloride (PF) 0.9% PF flush 3 mL (3 mLs Intracatheter Given 7/6/21 1222)   iopamidol (ISOVUE-370) solution 500 mL (77 mLs Intravenous Given 7/6/21 1223)   new 100 ml saline bag (60 mLs Intravenous Given 7/6/21 1223)       Labs are reviewed and were completely unremarkable.  CT scan did not show any signs of appendicitis or diverticulitis.  Talk to the patient further she has had some recent imaging done of her back that did show a herniated disc in her lower back.  She states that when she sits up that this pain in her belly seems to radiate down her leg.  I wonder if this belly pain could be a combination of the constipation noted on the CT scan but also may be some lumbar radiculopathy.  Again I treat the patient with a MiraLAX bowel cleanout to see if this helps with the constipation part.  Also go to do a Medrol Dosepak in case this is some lumbar radiculopathy.  She  was supposed to have a steroid injection of her back but this recently got denied.  Patient will follow up with her doctor if there is no improvement over the next few days.    Assessments & Plan (with Medical Decision Making)  Abdominal pain     I have reviewed the nursing notes.    I have reviewed the findings, diagnosis, plan and need for follow up with the patient.              7/6/2021   [facility] EMERGENCY DEPT     Dino Contreras MD  07/06/21 0928

## 2021-07-06 NOTE — ED TRIAGE NOTES
Pt reports abdominal pain that started 4 days ago, she reports it was high in the middle and has now moved to the lower abdomen

## 2021-07-16 DIAGNOSIS — F41.0 PANIC ATTACKS: ICD-10-CM

## 2021-07-16 RX ORDER — ALPRAZOLAM 0.5 MG
TABLET ORAL
Qty: 60 TABLET | Refills: 0 | Status: SHIPPED | OUTPATIENT
Start: 2021-07-17 | End: 2021-08-16

## 2021-07-16 NOTE — TELEPHONE ENCOUNTER
Pending Prescriptions:                       Disp   Refills    ALPRAZolam (XANAX) 0.5 MG tablet [Pharmacy*60 tab*0        Sig: TAKE ONE TABLET BY MOUTH TWO TIMES A DAY AS NEEDED           FOR PANIC ATTACKS (USE SPARINGLY)      Routing refill request to provider for review/approval because:  Drug not on the [facility] refill protocol     Jahaira Adams RN on 7/16/2021 at 1:02 PM

## 2021-08-16 DIAGNOSIS — F41.0 PANIC ATTACKS: ICD-10-CM

## 2021-08-16 RX ORDER — ALPRAZOLAM 0.5 MG
TABLET ORAL
Qty: 60 TABLET | Refills: 0 | Status: SHIPPED | OUTPATIENT
Start: 2021-08-16 | End: 2021-09-15

## 2021-09-14 DIAGNOSIS — R11.0 NAUSEA: ICD-10-CM

## 2021-09-14 DIAGNOSIS — F51.04 PSYCHOPHYSIOLOGICAL INSOMNIA: ICD-10-CM

## 2021-09-14 DIAGNOSIS — F41.9 ANXIETY: ICD-10-CM

## 2021-09-14 DIAGNOSIS — F41.0 PANIC ATTACKS: ICD-10-CM

## 2021-09-14 NOTE — TELEPHONE ENCOUNTER
Pending Prescriptions:                       Disp   Refills    ALPRAZolam (XANAX) 0.5 MG tablet [Pharmacy*60 tab*0        Sig: TAKE ONE TABLET BY MOUTH TWICE A DAY AS NEEDED FOR           PANIC ATTACKS (USE SPARINGLY)    ondansetron (ZOFRAN-ODT) 4 MG ODT tab [Pha*20 tab*2        Sig: DISSOLVE ONE TABLET BY MOUTH EVERY 6 HOURS AS NEEDED           FOR NAUSEA    zolpidem (AMBIEN) 5 MG tablet [Pharmacy Me*30 tab*5        Sig: TAKE ONE TABLET BY MOUTH NIGHTLY AS NEEDED FOR SLEEP      Routing refill request to provider for review/approval because:  Drug not on the [facility] refill protocol     Jahaira Adams RN on 9/14/2021 at 2:54 PM

## 2021-09-15 RX ORDER — ONDANSETRON 4 MG/1
4 TABLET, ORALLY DISINTEGRATING ORAL EVERY 6 HOURS PRN
Qty: 20 TABLET | Refills: 0 | Status: SHIPPED | OUTPATIENT
Start: 2021-09-15 | End: 2021-10-11

## 2021-09-15 RX ORDER — ZOLPIDEM TARTRATE 5 MG/1
TABLET ORAL
Qty: 30 TABLET | Refills: 0 | Status: SHIPPED | OUTPATIENT
Start: 2021-09-15 | End: 2021-10-11

## 2021-09-15 RX ORDER — ALPRAZOLAM 0.5 MG
TABLET ORAL
Qty: 60 TABLET | Refills: 0 | Status: SHIPPED | OUTPATIENT
Start: 2021-09-15 | End: 2021-10-11

## 2021-09-15 NOTE — TELEPHONE ENCOUNTER
Patient is due for every 6 month visit for refills. Please call to notify to set up appointment.  Florecita Story, CNP

## 2021-09-19 ENCOUNTER — HEALTH MAINTENANCE LETTER (OUTPATIENT)
Age: 46
End: 2021-09-19

## 2021-09-20 ENCOUNTER — MYC MEDICAL ADVICE (OUTPATIENT)
Dept: FAMILY MEDICINE | Facility: CLINIC | Age: 46
End: 2021-09-20

## 2021-09-28 ENCOUNTER — APPOINTMENT (OUTPATIENT)
Dept: GENERAL RADIOLOGY | Facility: CLINIC | Age: 46
End: 2021-09-28
Attending: EMERGENCY MEDICINE
Payer: COMMERCIAL

## 2021-09-28 ENCOUNTER — HOSPITAL ENCOUNTER (EMERGENCY)
Facility: CLINIC | Age: 46
Discharge: HOME | End: 2021-09-28
Attending: EMERGENCY MEDICINE | Admitting: EMERGENCY MEDICINE
Payer: COMMERCIAL

## 2021-09-28 ENCOUNTER — APPOINTMENT (OUTPATIENT)
Dept: CT IMAGING | Facility: CLINIC | Age: 46
End: 2021-09-28
Attending: EMERGENCY MEDICINE
Payer: COMMERCIAL

## 2021-09-28 VITALS
DIASTOLIC BLOOD PRESSURE: 79 MMHG | HEART RATE: 90 BPM | TEMPERATURE: 97.9 F | WEIGHT: 162 LBS | BODY MASS INDEX: 27.98 KG/M2 | RESPIRATION RATE: 12 BRPM | SYSTOLIC BLOOD PRESSURE: 115 MMHG | OXYGEN SATURATION: 96 %

## 2021-09-28 DIAGNOSIS — S39.012A BACK STRAIN, INITIAL ENCOUNTER: ICD-10-CM

## 2021-09-28 PROCEDURE — 73110 X-RAY EXAM OF WRIST: CPT | Mod: RT

## 2021-09-28 PROCEDURE — 99284 EMERGENCY DEPT VISIT MOD MDM: CPT | Performed by: EMERGENCY MEDICINE

## 2021-09-28 PROCEDURE — 99285 EMERGENCY DEPT VISIT HI MDM: CPT | Mod: 25

## 2021-09-28 PROCEDURE — 250N000011 HC RX IP 250 OP 636: Performed by: EMERGENCY MEDICINE

## 2021-09-28 PROCEDURE — 72128 CT CHEST SPINE W/O DYE: CPT

## 2021-09-28 PROCEDURE — 72125 CT NECK SPINE W/O DYE: CPT

## 2021-09-28 PROCEDURE — 96372 THER/PROPH/DIAG INJ SC/IM: CPT | Performed by: EMERGENCY MEDICINE

## 2021-09-28 PROCEDURE — 72131 CT LUMBAR SPINE W/O DYE: CPT

## 2021-09-28 RX ORDER — KETOROLAC TROMETHAMINE 30 MG/ML
60 INJECTION, SOLUTION INTRAMUSCULAR; INTRAVENOUS EVERY 6 HOURS PRN
Status: DISCONTINUED | OUTPATIENT
Start: 2021-09-28 | End: 2021-09-28 | Stop reason: HOSPADM

## 2021-09-28 RX ADMIN — KETOROLAC TROMETHAMINE 60 MG: 30 INJECTION, SOLUTION INTRAMUSCULAR at 18:57

## 2021-09-28 NOTE — ED TRIAGE NOTES
Patient states she slipped and fell at [employer] yesterday. C/o low back pain that radiates all the way up. Also c/o right sided neck pain.

## 2021-09-29 NOTE — ED NOTES
Pt reported to writer that the medication being given would not work because it never works, she was told she did not have to if she did not want to but she decided to try it and see what happened

## 2021-09-29 NOTE — ED NOTES
Dr Salas informed patient requesting more pain medications. Dr Salas acknowledged and did not provide further orders.

## 2021-09-29 NOTE — DISCHARGE INSTRUCTIONS
Tylenol up to 1000 mg 4 times a day as needed for pain.  You may use the Flexeril as needed as well.  You may  and try Salonpas, Lidoderm patch for your pain as needed as well.

## 2021-09-29 NOTE — ED PROVIDER NOTES
History     Chief Complaint   Patient presents with     Fall     HPI  Radha Beckwith is a 45 year old female who presents after a slip and fall at [facility] yesterday.  She states she has pain in her low back that radiates all the way up.  No loss of bowel or bladder.  Legs are working fine.  Pain is moderate to severe.  Also struck her right wrist which is painful.  No other significant injury.    Allergies:  Allergies   Allergen Reactions     Cafergot      12-            GI problems-     Seasonal Allergies      Sumatriptan      vomits after giving herself a shot     Compazine Anxiety     Droperidol Anxiety     Nubain [Nalbuphine Hcl] Anxiety     Prochlorperazine Palpitations     Uncontrolled movement       Problem List:    Patient Active Problem List    Diagnosis Date Noted     Panic attacks 12/23/2019     Priority: Medium     Situational depression 12/23/2019     Priority: Medium     Moderate major depression (H) 06/03/2019     Priority: Medium     Supraventricular tachycardia (H) 06/03/2019     Priority: Medium     S/P hysterectomy 12/30/2017     Priority: Medium     Psychophysiological insomnia 12/30/2017     Priority: Medium     Chronic bilateral low back pain without sciatica 12/30/2017     Priority: Medium     Papanicolaou smear of cervix with low grade squamous intraepithelial lesion (LGSIL) 07/07/2017     Priority: Medium     7/7/17 LSIL pap/+ HR HPV (not 16 or 18). Plan: colp bef 10/7/17.   8/10/17 Lester:Focal squamous atypia; cannot exclude koilocytosis   Plan: hysterectomy.  10/30/17 Hysterectomy surgery  01/09/19 Patient is lost to pap tracking follow-up         Opioid dependence in remission (H) 08/02/2015     Priority: Medium     On suboxone treatement with Garland Rodriguez MD, [place].  (Noted in 2015).  Has been off suboxone since at least June of 2017. 8/9/2017        Anxiety attack 07/31/2015     Priority: Medium     Hypokalemia 06/23/2015     Priority: Medium     Atypical chest pain  06/23/2015     Priority: Medium     Tobacco abuse 06/23/2015     Priority: Medium     Hyperlipidemia LDL goal <100 01/29/2014     Priority: Medium     Ingrowing nail 01/09/2014     Priority: Medium     Anxiety 08/19/2013     Priority: Medium     GERD (gastroesophageal reflux disease) 07/28/2010     Priority: Medium     Takes omeprazole and metoclopramide       Restless legs 07/28/2010     Priority: Medium        Past Medical History:    Past Medical History:   Diagnosis Date     Abdominal pain, right lower quadrant 03/09/08     Anxiety attack 7/31/2015     Dehydration      Gastric ulcer 7/31/2015     GERD (gastroesophageal reflux disease) 7/28/2010     Opioid dependence in remission (H) 8/2/2015     Other and unspecified ovarian cyst      Papanicolaou smear of cervix with low grade squamous intraepithelial lesion (LGSIL) 07/07/2017       Past Surgical History:    Past Surgical History:   Procedure Laterality Date     BIOPSY CERVICAL, LOCAL EXCISION, SINGLE/MULTIPLE N/A 8/10/2017    Procedure: BIOPSY CERVICAL, LOCAL EXCISION, SINGLE/MULTIPLE;;  Surgeon: Michael Chandler MD;  Location: [facility]     COLPOSCOPY, BIOPSY, COMBINED N/A 8/10/2017    Procedure: COMBINED COLPOSCOPY, BIOPSY;  Colposcopy with Cervical Biopsies and Endometrial Biopsy, Exam with Ultrasound;  Surgeon: Michael Chandler MD;  Location: [facility]     ESOPHAGOSCOPY, GASTROSCOPY, DUODENOSCOPY (EGD), COMBINED N/A 4/17/2017    Procedure: COMBINED ESOPHAGOSCOPY, GASTROSCOPY, DUODENOSCOPY (EGD);  Surgeon: Ibrahima Esposito MD;  Location: [facility] GI     EXAM UNDER ANESTHESIA PELVIC N/A 8/10/2017    Procedure: EXAM UNDER ANESTHESIA PELVIC;;  Surgeon: Michael Chandler MD;  Location: [facility]     HYSTERECTOMY       HYSTERECTOMY, PAP NO LONGER INDICATED       LAPAROSCOPIC CHOLECYSTECTOMY N/A 2/2/2018    Procedure: LAPAROSCOPIC CHOLECYSTECTOMY;  Laparoscopic Cholecystectomy;  Surgeon: Tigre Lowry DO;  Location: [facility]     LAPAROSCOPIC  HYSTERECTOMY TOTAL N/A 10/30/2017    Procedure: LAPAROSCOPIC HYSTERECTOMY TOTAL;  LAPAROSCOPIC HYSTERECTOMY TOTAL POSSIBLE SALPINGO-OOPHERECTOMY (BILATERAL);  Surgeon: Michael Chandler MD;  Location: [facility] UGI ENDOSCOPY, SIMPLE EXAM  01/07/08       Family History:    Family History   Problem Relation Age of Onset     Depression Mother      Respiratory Mother      Chronic Obstructive Pulmonary Disease Mother      Cerebrovascular Disease Father         Brain anyeurism     Adrenal Disorder Other      Chronic Obstructive Pulmonary Disease Other      Breast Cancer Cousin        Social History:  Marital Status:  Single [1]  Social History     Tobacco Use     Smoking status: Current Every Day Smoker     Packs/day: 0.50     Years: 20.00     Pack years: 10.00     Types: Cigarettes     Smokeless tobacco: Never Used   Substance Use Topics     Alcohol use: Yes     Comment: occasional drinks     Drug use: No        Medications:    acetaminophen (TYLENOL) 500 MG tablet  ALPRAZolam (XANAX) 0.5 MG tablet  buprenorphine HCl-naloxone HCl (SUBOXONE) 2-0.5 MG per film  cetirizine (ZYRTEC) 10 MG tablet  cyclobenzaprine (FLEXERIL) 5 MG tablet  DULoxetine (CYMBALTA) 20 MG capsule  famotidine (PEPCID) 40 MG tablet  lactulose (CHRONULAC) 10 GM/15ML solution  Melatonin 10 MG TABS tablet  omeprazole (PRILOSEC) 40 MG DR capsule  ondansetron (ZOFRAN-ODT) 4 MG ODT tab  propranolol (INDERAL) 20 MG tablet  QUEtiapine (SEROQUEL) 200 MG tablet  rOPINIRole (REQUIP) 0.5 MG tablet  simvastatin (ZOCOR) 10 MG tablet  zolpidem (AMBIEN) 5 MG tablet  carbamide peroxide (DEBROX) 6.5 % otic solution  methylPREDNISolone (MEDROL DOSEPAK) 4 MG tablet therapy pack  metoclopramide (REGLAN) 10 MG tablet  order for DME          Review of Systems  All other systems are reviewed and are negative    Physical Exam   BP: (!) 143/96  Pulse: 107  Temp: 97.9  F (36.6  C)  Resp: 12  Weight: 73.5 kg (162 lb)  SpO2: 98 %      Physical Exam  Constitutional:        General: She is not in acute distress.     Appearance: She is not diaphoretic.   HENT:      Head: Atraumatic.   Eyes:      Pupils: Pupils are equal, round, and reactive to light.   Cardiovascular:      Rate and Rhythm: Regular rhythm.      Heart sounds: Normal heart sounds.   Pulmonary:      Effort: No respiratory distress.      Breath sounds: Normal breath sounds.   Chest:      Chest wall: No tenderness.   Abdominal:      General: Bowel sounds are normal.      Palpations: Abdomen is soft.      Tenderness: There is no abdominal tenderness.   Musculoskeletal:         General: No tenderness. Normal range of motion.      Comments: Hypersensitive to palpation throughout the length of the lumbar, thoracic and cervical spine.  Strength intact in lower extremities.  All extremities move well.  There is some tenderness about the right distal radius.  No significant snuffbox tenderness.   Skin:     Findings: No abrasion or laceration.   Neurological:      Mental Status: She is alert and oriented to person, place, and time.         ED Course        Procedures              Critical Care time:  none               Results for orders placed or performed during the hospital encounter of 09/28/21 (from the past 24 hour(s))   XR Wrist Right 3 Views    Narrative    EXAM: XR WRIST RIGHT G/E 3 VIEWS  LOCATION: [facility]  DATE/TIME: 9/28/2021 7:08 PM    INDICATION: Wrist pain.  COMPARISON: None.      Impression    IMPRESSION: Normal joint spaces and alignment. No fracture.   CT Cervical Spine w/o Contrast    Narrative    EXAM: CT CERVICAL SPINE W/O CONTRAST  LOCATION: [facility]  DATE/TIME: 9/28/2021 7:38 PM    INDICATION: Neck trauma, midline tenderness (Age 16-64y).  COMPARISON: None.  TECHNIQUE: Routine CT Cervical Spine without IV contrast. Multiplanar reformats. Dose reduction techniques were used.    FINDINGS:  VERTEBRA: Normal vertebral body heights.  Straightening of the normal cervical lordosis. Alignment otherwise appears normal. No fracture or posttraumatic subluxation.     CANAL/FORAMINA: No significant spinal canal or neural foraminal stenosis.    PARASPINAL: Partially visualized emphysematous changes in the upper lung zones.      Impression    IMPRESSION:  1.  No fracture or posttraumatic subluxation.  2.  No high-grade spinal canal or neural foraminal stenosis.   CT Thoracic Spine w/o Contrast    Narrative    EXAM: CT THORACIC SPINE W/O CONTRAST  LOCATION: [facility]  DATE/TIME: 9/28/2021 7:38 PM    INDICATION: Polytrauma, critical, T/L spine injury suspected.  COMPARISON: None.  TECHNIQUE: Routine CT Thoracic Spine without IV contrast. Multiplanar reformats. Dose reduction techniques were used.     FINDINGS:  VERTEBRA: Normal vertebral body heights and alignment. No fracture or posttraumatic subluxation.     CANAL/FORAMINA: Mild multilevel degenerative disc disease with marginal anterior endplate spurring. No high-grade spinal canal or neural foraminal stenosis.    PARASPINAL: Surgical clips in the right upper quadrant of the abdomen. Partially visualized mild to moderate emphysematous changes in the bilateral lungs, including prominent paraseptal emphysematous change. Calcified right lung granuloma with calcified   right hilar lymph nodes, likely sequela of old granulomatous disease.      Impression    IMPRESSION:  1.  No acute fracture or posttraumatic subluxation of the thoracic spine.  2.  No high-grade spinal canal or neural foraminal stenosis.  3.  Emphysema.     CT Lumbar Spine w/o Contrast    Narrative    EXAM: CT LUMBAR SPINE W/O CONTRAST  LOCATION: [facility]  DATE/TIME: 9/28/2021 7:39 PM    INDICATION: Low back pain, increased fracture risk  COMPARISON: None.  TECHNIQUE: Routine CT Lumbar Spine without IV contrast. Multiplanar reformats. Dose reduction techniques were  used.    FINDINGS:  Nomenclature is based on 5 lumbar type vertebral bodies. No evidence of acute fracture or subluxation of the lumbar spine by CT imaging. The vertebral bodies of the lumbar spine have normal stature and alignment. The disc spaces are well-maintained. No   significant degenerative changes. The partially imaged intra-abdominal contents demonstrate cholecystectomy. Partially imaged intra-abdominal contents are otherwise unremarkable. Unremarkable visualized bony pelvis.    T12-L1: Normal disc signal and disc height. No posterior disc bulge or spinal canal narrowing. No neural foraminal narrowing.    L1-L2: Normal disc signal and disc height. No posterior disc bulge or spinal canal narrowing. No neural foraminal narrowing.    L2-L3: Normal disc signal and disc height. No posterior disc bulge or spinal canal narrowing. No neural foraminal narrowing.    L3-L4: Symmetric disc bulge and moderate facet arthropathy without spinal canal narrowing. Mild bilateral neural foraminal narrowing.    L4-L5: Symmetric disc bulge and moderate facet arthropathy without significant spinal canal narrowing. No neural foraminal narrowing.    L5-S1: Symmetric disc bulge and moderate facet arthropathy without significant spinal canal narrowing. Moderate left neural foraminal narrowing. No right neural filling.      Impression    IMPRESSION:  1.  No evidence of acute fracture or subluxation of the lumbar spine by CT imaging.  2.  Mild degenerative lumbar spondylosis as described above.       Medications - No data to display    Assessments & Plan (with Medical Decision Making)  45-year-old female who presents after a slip and fall at [facility] yesterday.  Some back pain radiating up her spine she reported.  CT of cervical, thoracic and lumbar spine without evidence of acute bony injury.  Contusion to the right wrist.  X-rays negative.  Appears stable for discharge home.  I recommend she continue with Tylenol and her Flexeril as  needed for pain.     I have reviewed the nursing notes.    I have reviewed the findings, diagnosis, plan and need for follow up with the patient.          Discharge Medication List as of 9/28/2021  8:47 PM          Final diagnoses:   Back strain, initial encounter       9/28/2021   [facility] EMERGENCY DEPT     Peter Salas MD  09/28/21 [id number]

## 2021-10-05 ENCOUNTER — OFFICE VISIT (OUTPATIENT)
Dept: FAMILY MEDICINE | Facility: CLINIC | Age: 46
End: 2021-10-05
Payer: COMMERCIAL

## 2021-10-05 VITALS
DIASTOLIC BLOOD PRESSURE: 80 MMHG | RESPIRATION RATE: 18 BRPM | SYSTOLIC BLOOD PRESSURE: 120 MMHG | WEIGHT: 165.13 LBS | BODY MASS INDEX: 28.52 KG/M2 | OXYGEN SATURATION: 97 % | TEMPERATURE: 97.5 F | HEART RATE: 111 BPM

## 2021-10-05 DIAGNOSIS — M54.6 ACUTE BILATERAL THORACIC BACK PAIN: ICD-10-CM

## 2021-10-05 DIAGNOSIS — M54.50 CHRONIC BILATERAL LOW BACK PAIN WITHOUT SCIATICA: ICD-10-CM

## 2021-10-05 DIAGNOSIS — M65.4 DE QUERVAIN'S DISEASE (TENOSYNOVITIS): ICD-10-CM

## 2021-10-05 DIAGNOSIS — G89.29 CHRONIC BILATERAL LOW BACK PAIN WITHOUT SCIATICA: ICD-10-CM
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
## 2021-10-05 PROCEDURE — 99215 OFFICE O/P EST HI 40 MIN: CPT | Performed by: STUDENT IN AN ORGANIZED HEALTH CARE EDUCATION/TRAINING PROGRAM

## 2021-10-05 RX ORDER — OXYCODONE HYDROCHLORIDE 5 MG/1
5 TABLET ORAL EVERY 6 HOURS PRN
Qty: 5 TABLET | Refills: 0 | Status: SHIPPED | OUTPATIENT
Start: 2021-10-05 | End: 2021-10-08

## 2021-10-05 ASSESSMENT — PAIN SCALES - GENERAL: PAINLEVEL: SEVERE PAIN (7)

## 2021-10-05 NOTE — PROGRESS NOTES
Assessment & Plan     Fall, subsequent encounter  Previous imaging in the ER ruled out traumatic fracture.  Things have been improving.     Acute bilateral thoracic back pain  Patient reports only laying in bed for the first several days given the severity of her pain.  No concerning symptoms of weight change, fevers or loss of bowel or bladder control.  Nothing down the legs at this time.  Things have slowly been improving and she said the only thing that got her up and starting her new job.  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  She cannot take other NSAIDs due to previous gastric ulcer and requesting further pain medication other than Tylenol that she is using without improvement.  Does have a long history of opioid use in the past but sober for 17 years and doing well.  She does follow-up for Suboxone which she takes every other day.  She thinks she is at a very stable for place and would just like something for a short time just in case of severe pain.  We did go over the risk of taking this medication as well as the danger of taking it with Xanax that she has as needed.  She is not to take the Xanax for the time being.  We also went over the risks of this medication in the setting of her previous addiction.  She is understanding and accepts the risk.  She will see chiropractor today and referral placed for physical therapy.   - Physical Therapy Referral   - oxyCODONE (ROXICODONE) 5 MG tablet  Dispense: 5 tablet; Refill: 0    De Quervain's disease (tenosynovitis)  She should continue to wear her brace at home and avoid repetitive movement that triggers her pain.  Can work with exercises and stretching at physical therapy due to at home.    Chronic bilateral low back pain without sciatica  - Physical Therapy Referral    40 minutes today spent on chart review and patient contact.     BMI:   Estimated body mass index is 28.52 kg/m  as calculated from the following:    Height as of  "3/11/21: 1.621 m (5' 3.8\").    Weight as of this encounter: 74.9 kg (165 lb 2 oz).     Return if symptoms worsen or fail to improve.    Renny Davis MD  [facility] ROMIE Sue is a 45 year old who presents for the following health issues  HPI      Back Pain  Onset/Duration: 8 days  Description:   Location of pain: middle of back bilateral and neck bilateral  Character of pain: burning and constant, shooting pain at times  Pain radiation: Shooting down the right leg a few days but not currentluy  New numbness or weakness in legs, not attributed to pain: no   Intensity: Currently 7/10  Progression of Symptoms: worsening  History:   Specific cause: fall   Pain interferes with job: YES  History of back problems: Bone spurs on spine, bulging discs  Any previous MRI or X-rays: Yes- at [place].  Date 6 months ago  Sees a specialist for back pain: No  Alleviating factors:    Improved by: heat  Seating in her recliner chair  Precipitating factors:  Worsened by: Lifting, Bending, Standing, Lying Flat and Walking  Therapies tried and outcome: acetaminophen (Tylenol) and heat, biofreeze    Accompanying Signs & Symptoms:  Risk of Fracture: None  Risk of Cauda Equina: None  Risk of Infection: None  Risk of Cancer: None  Risk of Ankylosing Spondylitis: Onset at age <35, male, AND morning back stiffness no    Does have some congestion that is chronic for her. She normally has allergies and takes cetirizine daily. No other new respiratory symptoms.       Review of Systems   Constitutional, HEENT, cardiovascular, pulmonary, gi and gu systems are negative, except as otherwise noted.      Objective    /80   Pulse 111   Temp 97.5  F (36.4  C) (Temporal)   Resp 18   Wt 74.9 kg (165 lb 2 oz)   LMP  (LMP Unknown)   SpO2 97%   Breastfeeding No   BMI 28.52 kg/m    Body mass index is 28.52 kg/m .  Physical Exam   GENERAL: healthy, alert and no distress  EYES: Eyes grossly normal to " inspection, PERRL and conjunctivae and sclerae normal  HENT: Hearing grossly intact  NECK: no adenopathy, no asymmetry, masses, or scars and thyroid normal to palpation  RESP: lungs clear to auscultation - no rales, rhonchi or wheezes  CV: regular rate and rhythm, normal S1 S2, no S3 or S4, no murmur, click or rub, no peripheral edema and peripheral pulses strong  MS: Paraspinal tenderness in her mid thoracic.  No spinal process tenderness.  Does have limited range of motion.  Positive Finkelstein's on the right.   SKIN: no suspicious lesions or rashes  NEURO: Normal strength and tone, mentation intact and speech normal  PSYCH: mentation appears normal, affect normal/bright

## 2021-10-11 ENCOUNTER — MYC REFILL (OUTPATIENT)
Dept: FAMILY MEDICINE | Facility: OTHER | Age: 46
End: 2021-10-11

## 2021-10-11 DIAGNOSIS — F51.04 PSYCHOPHYSIOLOGICAL INSOMNIA: ICD-10-CM

## 2021-10-11 DIAGNOSIS — R11.0 NAUSEA: ICD-10-CM

## 2021-10-11 DIAGNOSIS — F41.0 PANIC ATTACKS: ICD-10-CM

## 2021-10-11 DIAGNOSIS — F41.9 ANXIETY: ICD-10-CM

## 2021-10-12 RX ORDER — ONDANSETRON 4 MG/1
4 TABLET, ORALLY DISINTEGRATING ORAL EVERY 6 HOURS PRN
Qty: 20 TABLET | Refills: 0 | Status: SHIPPED | OUTPATIENT
Start: 2021-10-12 | End: 2021-11-15

## 2021-10-12 RX ORDER — ALPRAZOLAM 0.5 MG
TABLET ORAL
Qty: 60 TABLET | Refills: 0 | Status: SHIPPED | OUTPATIENT
Start: 2021-10-12 | End: 2021-11-15

## 2021-10-12 RX ORDER — ZOLPIDEM TARTRATE 5 MG/1
5 TABLET ORAL
Qty: 30 TABLET | Refills: 0 | Status: SHIPPED | OUTPATIENT
Start: 2021-10-12 | End: 2021-11-15

## 2021-10-12 NOTE — TELEPHONE ENCOUNTER
Pending Prescriptions:                       Disp   Refills    ALPRAZolam (XANAX) 0.5 MG tablet           60 tab*0          ondansetron (ZOFRAN-ODT) 4 MG ODT tab      20 tab*0        Sig: Take 1 tablet (4 mg) by mouth every 6 hours as needed           for nausea    zolpidem (AMBIEN) 5 MG tablet              30 tab*0          Routing refill request to provider for review/approval because:  Drug not on the [facility] refill protocol

## 2021-10-12 NOTE — TELEPHONE ENCOUNTER
Please call. Patient due for 6 month visit for controlled substances refills. Needs to establish care with new PCP since I am leaving for further refills of these medications.   Florecita Story, JEN

## 2021-10-19 PROBLEM — F32.9 MAJOR DEPRESSION: Status: ACTIVE | Noted: 2019-06-03

## 2021-11-01 ENCOUNTER — TRANSCRIBE ORDERS (OUTPATIENT)
Dept: OTHER | Age: 46
End: 2021-11-01

## 2021-11-01 DIAGNOSIS — G89.29 CHRONIC NECK PAIN: Primary | ICD-10-CM

## 2021-11-01 DIAGNOSIS — M54.2 CHRONIC NECK PAIN: Primary | ICD-10-CM

## 2021-11-01 DIAGNOSIS — M54.50 CHRONIC MIDLINE LOW BACK PAIN WITHOUT SCIATICA: ICD-10-CM

## 2021-11-01 DIAGNOSIS — G89.29 CHRONIC MIDLINE LOW BACK PAIN WITHOUT SCIATICA: ICD-10-CM

## 2021-11-12 ENCOUNTER — HOSPITAL ENCOUNTER (OUTPATIENT)
Dept: PHYSICAL THERAPY | Facility: CLINIC | Age: 46
Setting detail: THERAPIES SERIES
End: 2021-11-12
Attending: STUDENT IN AN ORGANIZED HEALTH CARE EDUCATION/TRAINING PROGRAM
Payer: COMMERCIAL

## 2021-11-12 DIAGNOSIS — G89.29 CHRONIC NECK PAIN: ICD-10-CM

## 2021-11-12 DIAGNOSIS — F41.0 PANIC ATTACKS: ICD-10-CM

## 2021-11-12 DIAGNOSIS — M54.50 CHRONIC MIDLINE LOW BACK PAIN WITHOUT SCIATICA: ICD-10-CM

## 2021-11-12 DIAGNOSIS — M54.2 CHRONIC NECK PAIN: ICD-10-CM

## 2021-11-12 DIAGNOSIS — G89.29 CHRONIC MIDLINE LOW BACK PAIN WITHOUT SCIATICA: ICD-10-CM

## 2021-11-12 PROCEDURE — 97112 NEUROMUSCULAR REEDUCATION: CPT | Mod: GP | Performed by: PHYSICAL THERAPIST

## 2021-11-12 PROCEDURE — 97162 PT EVAL MOD COMPLEX 30 MIN: CPT | Mod: GP | Performed by: PHYSICAL THERAPIST

## 2021-11-12 PROCEDURE — 97161 PT EVAL LOW COMPLEX 20 MIN: CPT | Mod: GP | Performed by: PHYSICAL THERAPIST

## 2021-11-12 RX ORDER — ALPRAZOLAM 0.5 MG
TABLET ORAL
Qty: 60 TABLET | Refills: 0 | Status: CANCELLED | OUTPATIENT
Start: 2021-11-12

## 2021-11-12 NOTE — TELEPHONE ENCOUNTER
Pending Prescriptions:                       Disp   Refills    ALPRAZolam (XANAX) 0.5 MG tablet [Pharmacy*60 tab*0        Sig: TAKE ONE TABLET (0.5 MG) BY MOUTH 2 TIMES DAILY AS           NEEDED  FOR PANIC ATTACK (USE SPARINGLY)    Routing refill request to provider for review/approval because:  Drug not on the [facility] refill protocol

## 2021-11-15 ENCOUNTER — MYC REFILL (OUTPATIENT)
Dept: FAMILY MEDICINE | Facility: OTHER | Age: 46
End: 2021-11-15
Payer: COMMERCIAL

## 2021-11-15 ENCOUNTER — HOSPITAL ENCOUNTER (OUTPATIENT)
Dept: PHYSICAL THERAPY | Facility: CLINIC | Age: 46
Setting detail: THERAPIES SERIES
End: 2021-11-15
Attending: FAMILY MEDICINE
Payer: COMMERCIAL

## 2021-11-15 DIAGNOSIS — F41.0 PANIC ATTACKS: ICD-10-CM

## 2021-11-15 DIAGNOSIS — F11.21 OPIOID DEPENDENCE IN REMISSION (H): ICD-10-CM

## 2021-11-15 DIAGNOSIS — F51.04 PSYCHOPHYSIOLOGICAL INSOMNIA: ICD-10-CM

## 2021-11-15 DIAGNOSIS — F41.9 ANXIETY: ICD-10-CM

## 2021-11-15 DIAGNOSIS — R11.0 NAUSEA: ICD-10-CM

## 2021-11-15 PROCEDURE — 97140 MANUAL THERAPY 1/> REGIONS: CPT | Mod: GP | Performed by: PHYSICAL THERAPIST

## 2021-11-16 RX ORDER — ONDANSETRON 4 MG/1
4 TABLET, ORALLY DISINTEGRATING ORAL EVERY 6 HOURS PRN
Qty: 20 TABLET | Refills: 0 | Status: SHIPPED | OUTPATIENT
Start: 2021-11-16 | End: 2022-01-05

## 2021-11-16 RX ORDER — ZOLPIDEM TARTRATE 5 MG/1
5 TABLET ORAL
Qty: 30 TABLET | Refills: 0 | Status: SHIPPED | OUTPATIENT
Start: 2021-11-16 | End: 2021-12-21

## 2021-11-16 RX ORDER — ALPRAZOLAM 0.5 MG
TABLET ORAL
Qty: 60 TABLET | Refills: 0 | Status: SHIPPED | OUTPATIENT
Start: 2021-11-16 | End: 2021-12-14

## 2021-11-17 RX ORDER — ALPRAZOLAM 0.5 MG
TABLET ORAL
Qty: 60 TABLET | Refills: 0 | OUTPATIENT
Start: 2021-11-17

## 2021-11-17 NOTE — PROGRESS NOTES
[facility]          OUTPATIENT PHYSICAL THERAPY ORTHOPEDIC EVALUATION  PLAN OF TREATMENT FOR OUTPATIENT REHABILITATION  (COMPLETE FOR INITIAL CLAIMS ONLY)  Patient's Last Name, First Name, M.I.  YOB: 1975  Radha Beckwith    Provider s Name:  [facility]   Medical Record No.  [id number]   Start of Care Date:  11/12/21   Onset Date:  09/27/21 (went to [facility] on 9-)   Type:     _X__PT   ___OT   ___SLP Medical Diagnosis:  Chronic neck pain M54.2, G89.29 Chronic midline low back pain without sciatica M54.50, G89.29      PT Diagnosis:  Neck and back pain with positive SIJ tests   Visits from [facility]:  1      _________________________________________________________________________________  Plan of Treatment/Functional Goals:  joint mobilization,manual therapy,neuromuscular re-education,ROM,strengthening,stretching  ongoing assessment     as needed for symptoms  Goals  Goal Identifier: Walking  Goal Description: Linette will be able to walk on level and stairs with railings as needed x 15 minutes with minimal increase in symptoms for getting round the community and her home  Target Date: 12/21/21    Goal Identifier: Driving  Goal Description: Linette will be able to turn her head to look over shoulders when driving for safety as noted with increase of cervical rotation to 40 degrees  Target Date: 12/14/21    Goal Identifier: Home program  Goal Description: Linette will be able to complete her home program as instructed to improve her strength, stabilization and ROM to improve her function as noted with a 20 point improvement on NDI and ALBERT with MICHELLE decreasing to medium  Target Date: 12/30/21    Therapy Frequency:     Predicted Duration of Therapy Intervention:  1-2 times per week x 6 weeks    Meme Peacock, PT                 I CERTIFY THE NEED FOR THESE SERVICES FURNISHED UNDER        THIS PLAN OF TREATMENT AND  WHILE UNDER MY CARE .             Physician Signature               Date    X_____________________________________________________                             Certification Date From:  11/12/21   Certification Date To:  12/24/21    Referring Provider:  Wood Gunter MD    Initial Assessment        See Epic Evaluation Start of Care Date: 11/12/21

## 2021-11-17 NOTE — PROGRESS NOTES
"   11/12/21 1254   General Information   Type of Visit Initial OP Ortho PT Evaluation   Start of Care Date 11/12/21   Referring Physician Wood Gunter MD   Patient/Family Goals Statement Get rid of her pain   Orders Evaluate and Treat   Date of Order 11/01/21   Certification Required? Yes  ([employer] [place] [employer])   Medical Diagnosis Chronic neck pain M54.2, G89.29 Chronic midline low back pain without sciatica M54.50, G89.29    Surgical/Medical history reviewed Yes   Precautions/Limitations no known precautions/limitations   Weight-Bearing Status - LUE full weight-bearing   Weight-Bearing Status - RUE full weight-bearing   Weight-Bearing Status - LLE full weight-bearing   Weight-Bearing Status - RLE full weight-bearing   General Information Comments HIstory also refer to EPIC for details: Skin being checked for cancer, GERD, anxiety, restless leg syndrome, hyperlipidemia, hypokalemia, Atypical chest pain, tobacco use, Opioid dependence in remission, SP hysterectomy, Psychophysiological insomnia, moderate depression, anxiety attacks, Situational depression       Present No   Body Part(s)   Body Part(s) Lumbar Spine/SI;Cervical Spine   Presentation and Etiology   Pertinent history of current problem (include personal factors and/or comorbidities that impact the POC) 46 yr old female here with neck and back pain that increased after a fall. HISTORY: Original pain began secondary to \"years of abuse\" of her body including softball, motor  bike racing, repetitive job tasks through the years, lifting heavy auto parts x 4 yrs. History of bilateral wrist pain, wearing splints and plans to continue to see her provider for this in [place]. ONSET: Reports falling at [facility]. It happened quickly. Noted a square item on the floor she had stepped on and describes it as slightly larger than a deck of cards, open and slick like gel ice pack consistency which was partially open after she slipped on it. " "She did not realize she had slipped on something until after she fell. She was holding 3 clothing items over her arm and reports falling back and not sure if she hit her head. An incident report was completed at the time of the fall. SLEEPING: not great, sleeping in reclining chair with a pad on top and support with heat and vibration for her neck. TREATMENT: Chiropractic care (will continue 1 time per week for neck and back)and noted bloating and diarrhea 4 days after the session. She is usually constipated. Not noticing any change after the sessions at this time. RED FLAGS: has to push hard at times to empty her bladder, no change with swallowing, coughing or sneezing, tinnitus bilaterally since the fall, no saddle symptoms, no foot drop. VISION: has new glasses and cannot wear them. HEARING: Wears hearing aides but does not have them today.  DAILY ACTIVITIES: watches TV, recent move and trying to unpack, took care of her mother until she passed. Lives with her significant other and who is very supportive. Son is in the  and is not able to see him as much as she would like. Reports she is going through menopause and was told brain fog can be normal with this. She is waiting to hear from her neuro regarding MRIs for brain aneurysm as er dad had a history of this.    Impairments A. Pain;D. Decreased ROM;G. Impaired balance;H. Impaired gait;J. Burning;N. Headaches  (burning mid back)   Functional Limitations perform activities of daily living;perform required work activities;perform desired leisure / sports activities   Symptom Location \"messed up\" the middle of her back and neck area  - more intense than prior to the fall. from mid sacral to neck and \"centerized\" based on activity. Initially had leg symptoms numbness bilateral posterior lat hips and med helped, headaches: temples behind eyes and gets sinus migraines and migraines for yrs at at about 32 yo they diminished. Constant and dizziness (sees " "fireflys in shower wtih head turn and arm shaving - head turned) feeling \"uneasy and losing balance (noticed by boyfriend and has to reach for something.    How/Where did it occur With a fall  (see above)   Onset date of current episode/exacerbation 09/27/21  (went to [facility] on 9-)   Chronicity New   Pain rating (0-10 point scale) Other   Pain rating comment Neck: now: 4/10, range: 3/10 to 10/10; Back: now: 6/10, range: 4/10 to 10/10 every day   Frequency of pain/symptoms A. Constant   Pain/symptoms are: Other   Pain symptoms comment morning is bad, thinks from being still (medications for sleep  hard x 4-5 hours wakes with lots of pain).    Pain/symptoms exacerbated by M. Other   Pain exacerbation comment bending over eg peeling potatoes, any activity   Pain/symptoms eased by J. Braces/supports   Progression of symptoms since onset: Worsened  (doing less and les day to day or week to week)   Current / Previous Interventions   Diagnostic Tests: X-ray;CT scan  (9- )   X-ray Results Results   X-ray results Wrist R: Normal joint spaces and alignment. No fracture.   CT Results Results  (from 9-)   CT results Cervical: No fracture; Cervical: No fracture or posttraumatic subluxation,  No high-grade spinal canal or neural foraminal stenosis.THORACIC: .  No acute fracture or posttraumatic subluxation of the thoracic spine .  No high-grade spinal canal or neural foraminal stenosis, emphysema. LUMBAR:  No evidence of acute fracture or subluxation of the lumbar spine by CT imaging, Mild degenerative lumbar spondylosis  - please see full report for details.    Prior Level of Function   Functional Level Prior Comment pain in back and neck but able to function and work.    Current Level of Function   Current Community Support   (SO, family support)   Patient role/employment history E. Unemployed  (had been working at [employer] prior to fall)   [facility]   Home/community accessibility driving and pain does not stop her from running errands - short periods.    Fall Risk Screen   Fall screen completed by PT   Have you fallen 2 or more times in the past year? No   Have you fallen and had an injury in the past year? Yes   Is patient a fall risk? No   Fall screen comments Situational - slipped - see above for details.    Abuse Screen (yes response referral indicated)   Feels Unsafe at Home or Work/School no   Feels Threatened by Someone no   Does Anyone Try to Keep You From Having Contact with Others or Doing Things Outside Your Home? no   Physical Signs of Abuse Present no   System Outcome Measures   Outcome Measures Low Back Pain (see Oswestry and Valeriy)  (VALERIY: 5/8 high; ALBERT: 25-10-50 - discussed the scores)   Functional Scales   Functional Scales Other   Other Scales  NDI: 24-26.67-53.33, discussed the results   Lumbar Spine/SI Objective Findings   Observation shifting positions at times throughout the subjective interview.    Integumentary negative   Posture Standing: increased lumbar lordosis and thoracic kyphosis with forward head.    Gait/Locomotion slow   Flexion ROM Minimal loss with increased symptoms with return to neutral   Extension ROM moderate limitation with increased symptoms   Pelvic  bilateral SIJs, tighness in the piriformis areas and along the sacrum. Pelvic obliquity with posterior rotation of the R innominant   Palpation tender along the lower lumbar spinous processes with palpation, along the iliac crests and tightness in lumbar paraspinal muscles.    Cervical Spine   Cervical Flexion ROM 40 degrees   Cervical Extension ROM 55 degrees   Cervical Right Rotation ROM 35 degrees   Cervical Left Rotation ROM 33 degrees   Palpation tightness in the upper trap and L scapular grinding/clicking  notes by palpation. Tender along the occiput to T1 are tender to palpation, also tender to palpation over the SCMs, posterior scalenes and delayed  tenderness with palpation over the temporalis muscles.    Planned Therapy Interventions   Planned Therapy Interventions joint mobilization;manual therapy;neuromuscular re-education;ROM;strengthening;stretching   Planned Therapy Interventions Comment ongoing assessment   Planned Modality Interventions   Planned Modality Interventions Comments as needed for symptoms   Clinical Impression   Criteria for Skilled Therapeutic Interventions Met yes, treatment indicated   PT Diagnosis Neck and back pain with positive SIJ tests   Influenced by the following impairments recent fall with chronic history of back and neck pain, decreased neck and back AROM, multiple areas of pain, comples history including anxiety, depression, GERD, tobacco use, concern about brain aneurysm   Functional limitations due to impairments bending, driving, rising, lifting and activities in general   Clinical Presentation Evolving/Changing   Clinical Presentation Rationale clinical judgement   Clinical Decision Making (Complexity) Moderate complexity   Predicted Duration of Therapy Intervention (days/wks) 1-2 times per week x 6 weeks   Risk & Benefits of therapy have been explained Yes   Patient, Family & other staff in agreement with plan of care Yes   Clinical Impression Comments Linette has a long history of back and neck pain with an acute increase after falling. She is seeing a chiropractor and was given exercises for her issues but does not remember them. She had a long history so limited time for full assessment of neck, back and pelvis. She does have tenderness in the SIJs and lumbar spine. She also has tightness throughout the upper back and neck. Skilled intervention for joint mobilization/manual therapy, gentle graded ROM and progression into strengthening as tolerated, aerobic activity.    Education Assessment   Preferred Learning Style Listening;Reading;Demonstration;Pictures/video   Barriers to Learning Hearing  (wears hearing aides, speak  loud and clear)   ORTHO GOALS   PT Ortho Eval Goals 1;2;3   Ortho Goal 1   Goal Identifier Walking   Goal Description Linette will be able to walk on level and stairs with railings as needed x 15 minutes with minimal increase in symptoms for getting round the community and her home   Target Date 12/21/21   Ortho Goal 2   Goal Identifier Driving   Goal Description Linette will be able to turn her head to look over shoulders when driving for safety as noted with increase of cervical rotation to 40 degrees   Target Date 12/14/21   Ortho Goal 3   Goal Identifier Home program   Goal Description Linette will be able to complete her home program as instructed to improve her strength, stabilization and ROM to improve her function as noted with a 20 point improvement on NDI and ALBERT with MICHELLE decreasing to medium   Target Date 12/30/21   Total Evaluation Time   PT Eval, Moderate Complexity Minutes (79990) 34   Therapy Certification   Certification date from 11/12/21   Certification date to 12/24/21   Medical Diagnosis Chronic neck pain M54.2, G89.29 Chronic midline low back pain without sciatica M54.50, G89.29

## 2021-11-24 ENCOUNTER — TELEPHONE (OUTPATIENT)
Dept: PHYSICAL THERAPY | Facility: CLINIC | Age: 46
End: 2021-11-24
Payer: COMMERCIAL

## 2021-12-01 ENCOUNTER — HOSPITAL ENCOUNTER (OUTPATIENT)
Dept: PHYSICAL THERAPY | Facility: CLINIC | Age: 46
Setting detail: THERAPIES SERIES
End: 2021-12-01
Attending: FAMILY MEDICINE
Payer: COMMERCIAL

## 2021-12-01 PROCEDURE — 97112 NEUROMUSCULAR REEDUCATION: CPT | Mod: GP | Performed by: PHYSICAL THERAPIST

## 2021-12-01 PROCEDURE — 97140 MANUAL THERAPY 1/> REGIONS: CPT | Mod: GP | Performed by: PHYSICAL THERAPIST

## 2021-12-02 ENCOUNTER — OFFICE VISIT (OUTPATIENT)
Dept: FAMILY MEDICINE | Facility: CLINIC | Age: 46
End: 2021-12-02
Payer: COMMERCIAL

## 2021-12-02 VITALS
WEIGHT: 168.6 LBS | OXYGEN SATURATION: 96 % | DIASTOLIC BLOOD PRESSURE: 68 MMHG | HEART RATE: 76 BPM | TEMPERATURE: 98 F | RESPIRATION RATE: 16 BRPM | SYSTOLIC BLOOD PRESSURE: 118 MMHG | BODY MASS INDEX: 29.12 KG/M2

## 2021-12-02 DIAGNOSIS — J02.9 ST (SORE THROAT): ICD-10-CM

## 2021-12-02 DIAGNOSIS — G89.29 CHRONIC NECK PAIN: ICD-10-CM

## 2021-12-02 DIAGNOSIS — F11.21 OPIOID DEPENDENCE IN REMISSION (H): ICD-10-CM

## 2021-12-02 DIAGNOSIS — I47.10 SUPRAVENTRICULAR TACHYCARDIA (H): ICD-10-CM

## 2021-12-02 DIAGNOSIS — G25.81 RESTLESS LEGS SYNDROME (RLS): ICD-10-CM

## 2021-12-02 DIAGNOSIS — M54.50 CHRONIC BILATERAL LOW BACK PAIN WITHOUT SCIATICA: ICD-10-CM

## 2021-12-02 DIAGNOSIS — H90.6 MIXED CONDUCTIVE AND SENSORINEURAL HEARING LOSS OF BOTH EARS: ICD-10-CM

## 2021-12-02 DIAGNOSIS — Z76.89 ENCOUNTER TO ESTABLISH CARE: Primary | ICD-10-CM

## 2021-12-02 DIAGNOSIS — G47.00 PERSISTENT INSOMNIA: ICD-10-CM

## 2021-12-02 DIAGNOSIS — K21.9 GASTROESOPHAGEAL REFLUX DISEASE WITHOUT ESOPHAGITIS: ICD-10-CM

## 2021-12-02 DIAGNOSIS — R11.0 NAUSEA: ICD-10-CM

## 2021-12-02 DIAGNOSIS — F33.1 MODERATE EPISODE OF RECURRENT MAJOR DEPRESSIVE DISORDER (H): ICD-10-CM

## 2021-12-02 DIAGNOSIS — F41.9 ANXIETY: ICD-10-CM

## 2021-12-02 DIAGNOSIS — R41.3 MEMORY LOSS: ICD-10-CM

## 2021-12-02 DIAGNOSIS — R00.0 SINUS TACHYCARDIA: ICD-10-CM

## 2021-12-02 DIAGNOSIS — F51.04 PSYCHOPHYSIOLOGICAL INSOMNIA: ICD-10-CM

## 2021-12-02 DIAGNOSIS — M54.2 CHRONIC NECK PAIN: ICD-10-CM

## 2021-12-02 DIAGNOSIS — G89.29 CHRONIC BILATERAL LOW BACK PAIN WITHOUT SCIATICA: ICD-10-CM

## 2021-12-02 PROBLEM — F43.21 SITUATIONAL DEPRESSION: Status: RESOLVED | Noted: 2019-12-23 | Resolved: 2021-12-02

## 2021-12-02 PROBLEM — R87.612 PAPANICOLAOU SMEAR OF CERVIX WITH LOW GRADE SQUAMOUS INTRAEPITHELIAL LESION (LGSIL): Status: RESOLVED | Noted: 2017-07-07 | Resolved: 2021-12-02

## 2021-12-02 PROBLEM — Z90.710 S/P HYSTERECTOMY: Status: RESOLVED | Noted: 2017-12-30 | Resolved: 2021-12-02

## 2021-12-02 PROBLEM — F41.0 PANIC ATTACKS: Status: RESOLVED | Noted: 2019-12-23 | Resolved: 2021-12-02

## 2021-12-02 PROCEDURE — 96127 BRIEF EMOTIONAL/BEHAV ASSMT: CPT | Performed by: NURSE PRACTITIONER

## 2021-12-02 PROCEDURE — 99215 OFFICE O/P EST HI 40 MIN: CPT | Performed by: NURSE PRACTITIONER

## 2021-12-02 RX ORDER — ROPINIROLE 1 MG/1
TABLET, FILM COATED ORAL
Qty: 90 TABLET | Refills: 0 | Status: SHIPPED | OUTPATIENT
Start: 2021-12-02 | End: 2022-04-02

## 2021-12-02 RX ORDER — DULOXETIN HYDROCHLORIDE 30 MG/1
30 CAPSULE, DELAYED RELEASE ORAL 2 TIMES DAILY
Qty: 180 CAPSULE | Refills: 0 | Status: SHIPPED | OUTPATIENT
Start: 2021-12-02 | End: 2022-08-18

## 2021-12-02 ASSESSMENT — ANXIETY QUESTIONNAIRES
7. FEELING AFRAID AS IF SOMETHING AWFUL MIGHT HAPPEN: NEARLY EVERY DAY
1. FEELING NERVOUS, ANXIOUS, OR ON EDGE: NEARLY EVERY DAY
4. TROUBLE RELAXING: SEVERAL DAYS
GAD7 TOTAL SCORE: 17
5. BEING SO RESTLESS THAT IT IS HARD TO SIT STILL: SEVERAL DAYS
8. IF YOU CHECKED OFF ANY PROBLEMS, HOW DIFFICULT HAVE THESE MADE IT FOR YOU TO DO YOUR WORK, TAKE CARE OF THINGS AT HOME, OR GET ALONG WITH OTHER PEOPLE?: VERY DIFFICULT
GAD7 TOTAL SCORE: 17
GAD7 TOTAL SCORE: 17
7. FEELING AFRAID AS IF SOMETHING AWFUL MIGHT HAPPEN: NEARLY EVERY DAY
2. NOT BEING ABLE TO STOP OR CONTROL WORRYING: NEARLY EVERY DAY
6. BECOMING EASILY ANNOYED OR IRRITABLE: NEARLY EVERY DAY
3. WORRYING TOO MUCH ABOUT DIFFERENT THINGS: NEARLY EVERY DAY

## 2021-12-02 ASSESSMENT — PAIN SCALES - GENERAL: PAINLEVEL: SEVERE PAIN (6)

## 2021-12-02 NOTE — PROGRESS NOTES
Assessment & Plan     Encounter to establish care  Extensive review of chart of medically complex patient.     Moderate episode of recurrent major depressive disorder (H)  PHQ-9 is 13.  Denies suicidal ideation.  She states she has tried multiple medications in the past for her depression/ anxiety.  She states the only thing that has worked is the xanax.  She has been seen by Psychiatry in the past.  She has weaned the xanax from 1mg twice a day to 0.5mg twice a day.  She does not want to stop the xanax.  She has been on it chronically with her other medications for over 10 years.  Will increase her cymbalta to see if will help with pain, depression and anxiety.    - DULoxetine (CYMBALTA) 30 MG capsule; Take 1 capsule (30 mg) by mouth 2 times daily  - EMOTIONAL / BEHAVIORAL ASSESSMENT    Opioid dependence in remission (H)  Followed by Wood Gunter for suboxone    Supraventricular tachycardia (H)  Controlled on propranolol    Anxiety  As above  - DULoxetine (CYMBALTA) 30 MG capsule; Take 1 capsule (30 mg) by mouth 2 times daily    Insomnia:  She states the seroquel is for sleep.  Of which she also takes ambien.  Discussed with her suboxone and combination of medications I am concerned for sedation.  She has been on these chronically for many years.        Chronic neck pain  Add cymbalta  - DULoxetine (CYMBALTA) 30 MG capsule; Take 1 capsule (30 mg) by mouth 2 times daily    Memory loss  States had a referral placed but no one call- referral sent.    - Adult Neurology Referral; Future    Mixed conductive and sensorineural hearing loss of both ears  Would like eval with ENT for hearing loss, recurrent pharyngitis.   - Otolaryngology Referral; Future    Restless legs syndrome (RLS)  Stable controlled  - rOPINIRole (REQUIP) 1 MG tablet; TAKE ONE TABLET BY MOUTH EVERY NIGHT AT BEDTIME    ST (sore throat)  As above  - Otolaryngology Referral; Future    Chronic bilateral low back pain without sciatica  Has had  "injections.  Seeing chiropractor    Gastroesophageal reflux disease without esophagitis  Controlled on PPI    45 minutes spent on the date of the encounter doing chart review, review of outside records, review of test results, interpretation of tests, patient visit and documentation     Declines covid and influenza vaccine today     BMI:   Estimated body mass index is 29.12 kg/m  as calculated from the following:    Height as of 3/11/21: 1.621 m (5' 3.8\").    Weight as of this encounter: 76.5 kg (168 lb 9.6 oz).   Weight management plan: Discussed healthy diet and exercise guidelines        Return in about 3 months (around 3/2/2022) for recheck with PCP.    Gisselle Linn NP  [facility] Sue is a 46 year old who presents for the following health issues    History of Present Illness       Mental Health Follow-up:  Patient presents to follow-up on Depression & Anxiety.Patient's depression since last visit has been:  Medium  The patient is not having other symptoms associated with depression.  Patient's anxiety since last visit has been:  Worse  The patient is having other symptoms associated with anxiety.  Any significant life events: job concerns, financial concerns, grief or loss and health concerns  Patient is feeling anxious or having panic attacks.  Patient has no concerns about alcohol or drug use.     Social History  Tobacco Use    Smoking status: Current Every Day Smoker      Packs/day: 0.50      Years: 20.00      Pack years: 10      Types: Cigarettes    Smokeless tobacco: Never Used  Vaping Use    Vaping Use: Some days      Substances: Nicotine, CBD      Devices: Refillable tank  Alcohol use: Yes    Comment: occasional drinks  Drug use: No      Today's PHQ-9         PHQ-9 Total Score:     (P) 13   PHQ-9 Q9 Thoughts of better off dead/self-harm past 2 weeks :   (P) Not at all   Thoughts of suicide or self harm:      Self-harm Plan:        Self-harm Action:        "   Safety concerns for self or others:           She eats 2-3 servings of fruits and vegetables daily.She consumes 1 sweetened beverage(s) daily.She exercises with enough effort to increase her heart rate 9 or less minutes per day.  She exercises with enough effort to increase her heart rate 3 or less days per week. She is missing 7 dose(s) of medications per week.       Is on seroquel/ambien for sleep.  Takes a muscle relaxant.  Is on suboxone for opiod dependance.  Is doing physical therapy for her back.  She is out of work.  She is on suboxone for 15 years.  She is slowly weaning the dosing.  Seeing Wood Gunter for her back right now.  Had a recent fall at [facility].  Seeing chiropractor.  Taking xanax twice a day- used to be on 1mg twice a day    Is on cymbalta- depression and meralgia paresthetica    Son is going to [employer].  He is in [place].            Review of Systems         Objective    /68 (BP Location: Left arm, Patient Position: Chair, Cuff Size: Adult Large)   Pulse 76   Temp 98  F (36.7  C) (Temporal)   Resp 16   Wt 76.5 kg (168 lb 9.6 oz)   LMP  (LMP Unknown)   SpO2 96%   BMI 29.12 kg/m    Body mass index is 29.12 kg/m .  Physical Exam                   Answers for HPI/ROS submitted by the patient on 12/2/2021  If you checked off any problems, how difficult have these problems made it for you to do your work, take care of things at home, or get along with other people?: Very difficult  PHQ9 TOTAL SCORE: 13  BO 7 TOTAL SCORE: 17

## 2021-12-02 NOTE — PATIENT INSTRUCTIONS
Call [phone] to schedule with ENT  Dr. Hough    [facility] will call you to schedule.

## 2021-12-02 NOTE — NURSING NOTE
Health Maintenance Due   Topic Date Due     ADVANCE CARE PLANNING  Never done     Pneumococcal Vaccine: Pediatrics (0 to 5 Years) and At-Risk Patients (6 to 64 Years) (1 of 2 - PPSV23) Never done     COVID-19 Vaccine (1) Never done     HEPATITIS C SCREENING  Never done     HEPATITIS B IMMUNIZATION (1 of 3 - Risk 3-dose series) Never done     PREVENTIVE CARE VISIT  07/07/2018     LIPID  02/11/2020     URINE DRUG SCREEN  11/25/2020     INFLUENZA VACCINE (1) 09/01/2021     April ERICKSON LPN

## 2021-12-03 ASSESSMENT — ANXIETY QUESTIONNAIRES: GAD7 TOTAL SCORE: 17

## 2021-12-03 ASSESSMENT — PATIENT HEALTH QUESTIONNAIRE - PHQ9: SUM OF ALL RESPONSES TO PHQ QUESTIONS 1-9: 13

## 2021-12-06 ENCOUNTER — HOSPITAL ENCOUNTER (OUTPATIENT)
Dept: PHYSICAL THERAPY | Facility: CLINIC | Age: 46
Setting detail: THERAPIES SERIES
End: 2021-12-06
Attending: FAMILY MEDICINE
Payer: COMMERCIAL

## 2021-12-06 PROCEDURE — 97110 THERAPEUTIC EXERCISES: CPT | Mod: GP | Performed by: PHYSICAL THERAPIST

## 2021-12-06 PROCEDURE — 97112 NEUROMUSCULAR REEDUCATION: CPT | Mod: GP | Performed by: PHYSICAL THERAPIST

## 2021-12-06 PROCEDURE — 97140 MANUAL THERAPY 1/> REGIONS: CPT | Mod: GP | Performed by: PHYSICAL THERAPIST

## 2021-12-12 DIAGNOSIS — M79.18 LEFT BUTTOCK PAIN: ICD-10-CM

## 2021-12-12 DIAGNOSIS — M25.552 HIP PAIN, LEFT: ICD-10-CM

## 2021-12-12 DIAGNOSIS — F41.0 PANIC ATTACKS: ICD-10-CM

## 2021-12-13 NOTE — TELEPHONE ENCOUNTER
Xanax      Last Written Prescription Date:  11/16/2021  Last Fill Quantity: 60,   # refills: 0  Last Office Visit: 12/2/2021  Future Office visit:    Next 5 appointments (look out 90 days)      Jan 19, 2022  4:15 PM  PHYSICAL with Clare Carmona DO  [facility] ([facility] ) [address] [place] [id number] [phone]             Routing refill request to provider for review/approval because:  Drug not on the [facility], [facility] or [facility] refill protocol or controlled substance

## 2021-12-14 RX ORDER — ALPRAZOLAM 0.5 MG
TABLET ORAL
Qty: 60 TABLET | Refills: 0 | Status: SHIPPED | OUTPATIENT
Start: 2021-12-14 | End: 2022-01-12

## 2021-12-15 RX ORDER — DULOXETIN HYDROCHLORIDE 20 MG/1
20 CAPSULE, DELAYED RELEASE ORAL 2 TIMES DAILY
Qty: 180 CAPSULE | Refills: 3 | OUTPATIENT
Start: 2021-12-15

## 2021-12-20 DIAGNOSIS — F51.04 PSYCHOPHYSIOLOGICAL INSOMNIA: ICD-10-CM

## 2021-12-21 RX ORDER — ZOLPIDEM TARTRATE 5 MG/1
5 TABLET ORAL
Qty: 30 TABLET | Refills: 0 | Status: SHIPPED | OUTPATIENT
Start: 2021-12-21 | End: 2022-01-25

## 2021-12-21 NOTE — TELEPHONE ENCOUNTER
Requested Prescriptions   Pending Prescriptions Disp Refills     zolpidem (AMBIEN) 5 MG tablet [Pharmacy Med Name: ZOLPIDEM TARTRATE 5MG TABS] 30 tablet 0     Sig: TAKE 1 TABLET (5 MG) BY MOUTH NIGHTLY AS NEEDED FOR SLEEP     Last Written Prescription Date:  11/16/2021  Last Fill Quantity: 30,   # refills: 0  Last Office Visit: 12/02/2021  Future Office visit:    Next 5 appointments (look out 90 days)    Jan 19, 2022  4:15 PM  PHYSICAL with Clare Carmona DO  [facility] ([facility] ) [address] [place] [id number] [phone]           Routing refill request to provider for review/approval because:  Drug not on the [facility], [facility] or [facility] refill protocol or controlled substance

## 2021-12-27 ENCOUNTER — TELEPHONE (OUTPATIENT)
Dept: PHYSICAL THERAPY | Facility: OTHER | Age: 46
End: 2021-12-27
Payer: COMMERCIAL

## 2022-01-04 ENCOUNTER — HOSPITAL ENCOUNTER (OUTPATIENT)
Dept: PHYSICAL THERAPY | Facility: CLINIC | Age: 47
Setting detail: THERAPIES SERIES
End: 2022-01-04
Attending: FAMILY MEDICINE
Payer: COMMERCIAL

## 2022-01-04 DIAGNOSIS — F41.9 ANXIETY: ICD-10-CM

## 2022-01-04 DIAGNOSIS — M54.2 CHRONIC NECK PAIN: ICD-10-CM

## 2022-01-04 DIAGNOSIS — R11.0 NAUSEA: ICD-10-CM

## 2022-01-04 DIAGNOSIS — G89.29 CHRONIC NECK PAIN: ICD-10-CM

## 2022-01-04 PROCEDURE — 97112 NEUROMUSCULAR REEDUCATION: CPT | Mod: GP | Performed by: PHYSICAL THERAPIST

## 2022-01-04 RX ORDER — CYCLOBENZAPRINE HCL 5 MG
TABLET ORAL
Qty: 90 TABLET | Refills: 5 | Status: SHIPPED | OUTPATIENT
Start: 2022-01-04 | End: 2022-04-07

## 2022-01-04 NOTE — TELEPHONE ENCOUNTER
CYCLOBENZAPRINE HCL 5MG TABS       Last Written Prescription Date:  9/1/21  Last Fill Quantity: 90,   # refills: 0  Last Office Visit: 12/2/21  Future Office visit:    Next 5 appointments (look out 90 days)    Jan 19, 2022  4:15 PM  PHYSICAL with Clare Carmona DO  [facility] ([facility] ) [address] [place] [id number] [phone]           Routing refill request to provider for review/approval because:  Drug not on the [facility], [facility] or [facility] refill protocol or controlled substance

## 2022-01-04 NOTE — PROGRESS NOTES
[facility]    OUTPATIENT PHYSICAL THERAPY  PLAN OF TREATMENT FOR OUTPATIENT REHABILITATION AND PROGRESS NOTE           Patient's Last Name, First Name, Radha Ruiz Date of Birth  1975   Provider's Name  [facility] Medical Record No.  [id number]    Onset Date  09/27/21 (went to [facility] on 9-) Start of Care Date  11-   Type:     _X_PT   ___OT   ___SLP Medical Diagnosis  Chronic neck pain M54.2, G89.29 Chronic midline low back pain without sciatica M54.50, G89.29    PT Diagnosis  Neck and back pain with positive SIJ tests Plan of Treatment  Frequency/Duration: 1-2 times per week x 6 weeks  Certification date from  12-  To 2-3-2022     Goals:  Goal Identifier Walking   Goal Description Linette will be able to walk on level and stairs with railings as needed x 15 minutes with minimal increase in symptoms for getting round the community and her home (30 minutes to 60 minutes based on terrain eg uneven vs even)   Target Date 01/30/22   Date Met      Progress (detail required for progress note):  (30 minutes to 60 minutes based on terrain eg uneven vs even)     Goal Identifier Driving   Goal Description Linette will be able to turn her head to look over shoulders when driving for safety as noted with increase of cervical rotation to 40 degrees (has not been driving x 2 w,)   Target Date 02/20/22   Date Met      Progress (detail required for progress note):  Not driving x 2 weeks, severe increase in symptoms after recently driving 2 hours round trip to airport.      Goal Identifier Home program   Goal Description Linette will be able to complete her home program as instructed to improve her strength, stabilization and ROM to improve her function as noted with a 20 point improvement on NDI and ALBERT with MICHELLE decreasing  to medium (has been but less so with holiday and company at home)   Target Date 02/20/22   Date Met      Progress (detail required for progress note):  No change MICHELLE, scores for ALBERT and NDI declined.        Beginning/End Dates of Progress Note Reporting Period:  5 total sessions between 11- and 1-4-2022    Progress Toward Goals:   Progress this reporting period: Linette was last seen in PT on 12-6-2021 as her son was home from the service. She reports she had been shoveling, drove 2 hours to the air port and back with her son helping her see around her, and went into her crawl space recently. All of these activities increased her symptoms in her back and neck. She had a decline in her score for NDI and ALBERT reflecting this increase. She did try the recommendation s for laundry and this was helpful. She had negative SIJ compression test, negative thigh thrust and Gaeslen's tests bilaterally. Her symptoms with these tests were in her mid back and lumbar spine. Tender with palpation over the SIJs L>R. Negative sacral spring test and SIJ posterior distraction tests as well. In walking, she had increased movement in the transverse plane with L pelvic rotation compared to the R. This improved noting increased transverse plane to R following the muscle energy techniques. Cervical rotation AROM increased after soft tissue releases and tissue relaxed in the mid cervical spine x 80% by palpation after the session as well. Skilled intervention for education, posture and positioning, ROM and stabilization.     Client Self (Subjective) Report for Progress Note Reporting Period: Did not drive x last 2 weeks. Drove son to airport(2 hours total) and could barely walk once home, unable to look for traffic to R and son helped her look for taffic - issues both ways. Mid back is increasing in pain intensity. Over worked at home and went into crawl space again. She had company.. Significant other not home so had to feed chickens  and shovel snow. Helpful to lay flat supine on floor without success due to body shape. Could not get L boot on - couldn' t move that way.     Outcome Measures (Most Recent Score):    Valeriy STarT Sub-Score (Q5-9): 5  Valeriy STarT Total Score (all 9): 9  Oswestry Score (%): 62 %    Objective Measurements:   Objective Measure: Symptoms  Details: Neck: now 4/10, increases to 10/10; low back: now: 3/10, increases to 10/10. Mid back: now: 5-6/10, 4/10 to 10/10.   Objective Measure: NDI (: 24-26.67-53.33)  Details: 10-34-34-68  Objective Measure: Cervical AROM for rotation  Details: cervical rotation R: 40 degrees;  L 27 degrees seated before session and 50 degrees R and 34 degrees L after session  Objective Measure: Standing low back AROM  Details: LBAROM: Flexion 8 inches finger tips to floor and extension mod loss with pain.                         I CERTIFY THE NEED FOR THESE SERVICES FURNISHED UNDER        THIS PLAN OF TREATMENT AND WHILE UNDER MY CARE .             Physician Signature               Date    X_____________________________________________________                      Referring Provider: Wood Peacock, PT

## 2022-01-05 RX ORDER — ONDANSETRON 4 MG/1
TABLET, ORALLY DISINTEGRATING ORAL
Qty: 20 TABLET | Refills: 1 | Status: SHIPPED | OUTPATIENT
Start: 2022-01-05 | End: 2022-03-14

## 2022-01-06 ENCOUNTER — HOSPITAL ENCOUNTER (OUTPATIENT)
Dept: PHYSICAL THERAPY | Facility: CLINIC | Age: 47
Setting detail: THERAPIES SERIES
End: 2022-01-06
Attending: FAMILY MEDICINE
Payer: COMMERCIAL

## 2022-01-06 PROCEDURE — 97112 NEUROMUSCULAR REEDUCATION: CPT | Mod: GP | Performed by: PHYSICAL THERAPIST

## 2022-01-06 PROCEDURE — 97140 MANUAL THERAPY 1/> REGIONS: CPT | Mod: GP | Performed by: PHYSICAL THERAPIST

## 2022-01-09 ENCOUNTER — HEALTH MAINTENANCE LETTER (OUTPATIENT)
Age: 47
End: 2022-01-09

## 2022-01-11 ENCOUNTER — HOSPITAL ENCOUNTER (OUTPATIENT)
Dept: PHYSICAL THERAPY | Facility: CLINIC | Age: 47
Setting detail: THERAPIES SERIES
End: 2022-01-11
Attending: FAMILY MEDICINE
Payer: COMMERCIAL

## 2022-01-11 DIAGNOSIS — F41.0 PANIC ATTACKS: ICD-10-CM

## 2022-01-11 PROCEDURE — 97112 NEUROMUSCULAR REEDUCATION: CPT | Mod: GP | Performed by: PHYSICAL THERAPIST

## 2022-01-11 PROCEDURE — 97140 MANUAL THERAPY 1/> REGIONS: CPT | Mod: GP | Performed by: PHYSICAL THERAPIST

## 2022-01-12 RX ORDER — ALPRAZOLAM 0.5 MG
TABLET ORAL
Qty: 60 TABLET | Refills: 0 | Status: SHIPPED | OUTPATIENT
Start: 2022-01-12 | End: 2022-02-14

## 2022-01-12 NOTE — TELEPHONE ENCOUNTER
Xanax      Last Written Prescription Date:  12/14/2021  Last Fill Quantity: 60,   # refills: 0  Last Office Visit: 12/2/2021  Future Office visit:    Next 5 appointments (look out 90 days)      Jan 19, 2022  4:15 PM  PHYSICAL with Clare Carmona DO  [facility] [address] [place] [id number] [phone]             Routing refill request to provider for review/approval because:  Drug not on the [facility], [facility] or [facility] refill protocol or controlled substance

## 2022-01-13 ENCOUNTER — HOSPITAL ENCOUNTER (OUTPATIENT)
Dept: PHYSICAL THERAPY | Facility: CLINIC | Age: 47
Setting detail: THERAPIES SERIES
End: 2022-01-13
Attending: FAMILY MEDICINE
Payer: COMMERCIAL

## 2022-01-13 PROCEDURE — 97140 MANUAL THERAPY 1/> REGIONS: CPT | Mod: GP | Performed by: PHYSICAL THERAPIST

## 2022-01-13 PROCEDURE — 97112 NEUROMUSCULAR REEDUCATION: CPT | Mod: GP | Performed by: PHYSICAL THERAPIST

## 2022-01-18 ENCOUNTER — HOSPITAL ENCOUNTER (OUTPATIENT)
Dept: PHYSICAL THERAPY | Facility: CLINIC | Age: 47
Setting detail: THERAPIES SERIES
End: 2022-01-18
Attending: FAMILY MEDICINE
Payer: COMMERCIAL

## 2022-01-18 PROCEDURE — 97112 NEUROMUSCULAR REEDUCATION: CPT | Mod: GP | Performed by: PHYSICAL THERAPIST

## 2022-01-18 PROCEDURE — 97140 MANUAL THERAPY 1/> REGIONS: CPT | Mod: GP | Performed by: PHYSICAL THERAPIST

## 2022-01-21 DIAGNOSIS — F51.04 PSYCHOPHYSIOLOGICAL INSOMNIA: ICD-10-CM

## 2022-01-21 NOTE — TELEPHONE ENCOUNTER
Ambien      Last Written Prescription Date:  12/21/2021  Last Fill Quantity: 30,   # refills: 0  Last Office Visit: 12/2/2021  Future Office visit:       Routing refill request to provider for review/approval because:  Drug not on the [facility], [facility] or [facility] refill protocol or controlled substance

## 2022-01-25 RX ORDER — ZOLPIDEM TARTRATE 5 MG/1
5 TABLET ORAL
Qty: 30 TABLET | Refills: 0 | Status: SHIPPED | OUTPATIENT
Start: 2022-01-25 | End: 2022-02-22

## 2022-01-27 ENCOUNTER — HOSPITAL ENCOUNTER (OUTPATIENT)
Dept: PHYSICAL THERAPY | Facility: CLINIC | Age: 47
Setting detail: THERAPIES SERIES
End: 2022-01-27
Attending: FAMILY MEDICINE
Payer: COMMERCIAL

## 2022-01-27 PROCEDURE — 97112 NEUROMUSCULAR REEDUCATION: CPT | Mod: GP | Performed by: PHYSICAL THERAPIST

## 2022-01-27 PROCEDURE — 97110 THERAPEUTIC EXERCISES: CPT | Mod: GP | Performed by: PHYSICAL THERAPIST

## 2022-02-13 DIAGNOSIS — F41.0 PANIC ATTACKS: ICD-10-CM

## 2022-02-14 RX ORDER — ALPRAZOLAM 0.5 MG
TABLET ORAL
Qty: 60 TABLET | Refills: 0 | Status: SHIPPED | OUTPATIENT
Start: 2022-02-14 | End: 2022-03-17

## 2022-02-14 NOTE — TELEPHONE ENCOUNTER
Routing refill request to provider for review/approval because:  Drug not on the [facility] refill protocol        present

## 2022-02-21 DIAGNOSIS — F51.04 PSYCHOPHYSIOLOGICAL INSOMNIA: ICD-10-CM

## 2022-02-22 RX ORDER — ZOLPIDEM TARTRATE 5 MG/1
5 TABLET ORAL
Qty: 30 TABLET | Refills: 0 | Status: SHIPPED | OUTPATIENT
Start: 2022-02-22 | End: 2022-04-02

## 2022-02-22 NOTE — TELEPHONE ENCOUNTER
Pending Prescriptions:                       Disp   Refills    zolpidem (AMBIEN) 5 MG tablet [Pharmacy Me*30 tab*0        Sig: TAKE 1 TABLET (5 MG) BY MOUTH NIGHTLY AS NEEDED FOR           SLEEP    Routing refill request to provider for review/approval because:  Drug not on the [facility] refill protocol   zolpidem (AMBIEN) 5 MG tablet 30 tablet 0 1/25/2022  No   Sig - Route: TAKE 1 TABLET (5 MG) BY MOUTH NIGHTLY AS NEEDED FOR SLEEP - Oral   Sent to pharmacy as: Zolpidem Tartrate 5 MG Oral Tablet (AMBIEN)   Class: E-Prescribe   Order: [id number]   E-Prescribing Status: Receipt confirmed by pharmacy (1/25/2022  9:13 AM CST)       Requested Prescriptions   Pending Prescriptions Disp Refills    zolpidem (AMBIEN) 5 MG tablet [Pharmacy Med Name: ZOLPIDEM TARTRATE 5MG TABS] 30 tablet 0     Sig: TAKE 1 TABLET (5 MG) BY MOUTH NIGHTLY AS NEEDED FOR SLEEP        There is no refill protocol information for this order

## 2022-02-28 ENCOUNTER — TELEPHONE (OUTPATIENT)
Dept: PHYSICAL THERAPY | Facility: OTHER | Age: 47
End: 2022-02-28
Payer: COMMERCIAL

## 2022-02-28 ENCOUNTER — TELEPHONE (OUTPATIENT)
Dept: PHYSICAL THERAPY | Facility: OTHER | Age: 47
End: 2022-02-28

## 2022-03-04 ENCOUNTER — HOSPITAL ENCOUNTER (OUTPATIENT)
Dept: PHYSICAL THERAPY | Facility: CLINIC | Age: 47
Setting detail: THERAPIES SERIES
End: 2022-03-04
Attending: FAMILY MEDICINE
Payer: COMMERCIAL

## 2022-03-04 PROCEDURE — 97140 MANUAL THERAPY 1/> REGIONS: CPT | Mod: GP | Performed by: PHYSICAL THERAPIST

## 2022-03-04 PROCEDURE — 97112 NEUROMUSCULAR REEDUCATION: CPT | Mod: GP | Performed by: PHYSICAL THERAPIST

## 2022-03-08 ENCOUNTER — HOSPITAL ENCOUNTER (OUTPATIENT)
Dept: MRI IMAGING | Facility: CLINIC | Age: 47
End: 2022-03-08
Attending: FAMILY MEDICINE
Payer: COMMERCIAL

## 2022-03-08 ENCOUNTER — HOSPITAL ENCOUNTER (OUTPATIENT)
Dept: MAMMOGRAPHY | Facility: CLINIC | Age: 47
End: 2022-03-08
Attending: STUDENT IN AN ORGANIZED HEALTH CARE EDUCATION/TRAINING PROGRAM
Payer: COMMERCIAL

## 2022-03-08 DIAGNOSIS — G89.29 CHRONIC MIDLINE LOW BACK PAIN WITHOUT SCIATICA: ICD-10-CM

## 2022-03-08 DIAGNOSIS — M54.50 CHRONIC MIDLINE LOW BACK PAIN WITHOUT SCIATICA: ICD-10-CM

## 2022-03-08 PROCEDURE — 72148 MRI LUMBAR SPINE W/O DYE: CPT

## 2022-03-08 PROCEDURE — 77067 SCR MAMMO BI INCL CAD: CPT

## 2022-03-08 NOTE — PROGRESS NOTES
[facility]    OUTPATIENT PHYSICAL THERAPY  PLAN OF TREATMENT FOR OUTPATIENT REHABILITATION AND PROGRESS NOTE           Patient's Last Name, First Name, Radha Ruiz Date of Birth  1975   Provider's Name  [facility] Medical Record No.  [id number]    Onset Date09/27/21 (went to [facility] on 9-) Start of Care Date  11-   Type:     _X_PT   ___OT   ___SLP Medical Diagnosis  Chronic neck pain M54.2, G89.29 Chronic midline low back pain without sciatica M54.50, G89.29    PT Diagnosis  Neck and back pain with positive SIJ tests Plan of Treatment  Frequency/Duration: 1-2 times per week x 8 weeks  Certification date from 2-3-2022 to 3-     Goals:  Goal Identifier Walking   Goal Description Linette will be able to walk on level and stairs with railings as needed x 15 minutes with minimal increase in symptoms for getting round the community and her home   Target Date 04/20/22   Date Met      Progress (detail required for progress note): Walking: 15-30 minutes and symptoms can get quite severe at 10/10 (end of walking), stairs: awful due to bad knees, morning one step at a time and afternoon reciprocally using railings     Goal Identifier Driving   Goal Description Linette will be able to turn her head to look over shoulders when driving for safety as noted with increase of cervical rotation to 40 degrees (has not been driving x 2 w, last reported some ease w/rot)   Target Date 04/30/22   Date Met      Progress (detail required for progress note): has not been driving, feels this has improved (ROM better)     Goal Identifier Home program   Goal Description Linette will be able to complete her home program as instructed to improve her strength, stabilization and ROM to improve her function as noted with a 20 point improvement  "on NDI and ALBERT with VALERIY decreasing to medium   Target Date 03/30/22   Date Met      Progress (detail required for progress note): Completing neck ROM. Back symptoms are increasing and hard to complete exercises     Goal Identifier Back pain   Goal Description Linette would like to get rid of her back pain so she can do everything   Target Date 08/31/22   Date Met      Progress (detail required for progress note): Personal goal and she has started core and posture work to progess toward this     Beginning/End Dates of Progress Note Reporting Period:  1 session between  3-4-2022     Progress Toward Goals:   Linette was not sure PT was making a difference so we agreed she would hold PT and work on improving her function. She is here today as she is noting increased low back pain and would like to be able to \"do everything\". She has been meeting her walking goal and her home program goal. She has significant tightness into the R low back and sacral area and the suboccipital base. Because of the chronicity of her symptoms, we agreed to focus on function more than symptoms at this time. Skilled intervention for pain education, activity to promote function to meet her goal and manual therapy for deeper stretches.     Client Self (Subjective) Report for Progress Note Reporting Period: Took break from PT and felt she needed more. She is having an increase in low back pain and not able to get into lower cupboards. Received 2nd COVID vaccine and got very sick (last week). She would like to focus on her lower and mid back area. Has been crying trying to \"wash something\" in her tub and after a minute or 2 her symptoms increased. Bilateral burning symptoms returning posterior hips - as previous since medication stopped.     Outcome Measures (Most Recent Score):    Valeriy STarT Sub-Score (Q5-9): 5  Valeriy STarT Total Score (all 9): 9  Oswestry Score (%): 70 %    Objective Measurements:   Objective Measure: Symptoms  Details: Headache: " 2/10 increasing to 10/10, Neck: now: 2/10 increases to 10/10 (less often at this level), mid back: now: 7/10, 4/10 in recliner with TNS and 4 pillows increasing to 10/10; Low back: same as mid back.   Objective Measure: Seated cervical AROM  Details: Rotation R: 35 degrees L: 39 degrees; flexion 39 degrees; extension: 67 degrees; SB: R: 17 degrees, L: 20 degrees  Objective Measure: Low back AROM  Details: Flexion: 92 degrees, extension: 7 degrees  Objective Measure: NDI (initial score from 1-4-2022: 10-34-34-68)  Details: 10-31-31-62             I CERTIFY THE NEED FOR THESE SERVICES FURNISHED UNDER        THIS PLAN OF TREATMENT AND WHILE UNDER MY CARE .             Physician Signature               Date    X_____________________________________________________                      Referring Provider: Wood Peacock, PT

## 2022-03-11 DIAGNOSIS — F41.9 ANXIETY: ICD-10-CM

## 2022-03-11 DIAGNOSIS — R11.0 NAUSEA: ICD-10-CM

## 2022-03-14 DIAGNOSIS — F41.0 PANIC ATTACKS: ICD-10-CM

## 2022-03-14 RX ORDER — ONDANSETRON 4 MG/1
TABLET, ORALLY DISINTEGRATING ORAL
Qty: 20 TABLET | Refills: 1 | Status: SHIPPED | OUTPATIENT
Start: 2022-03-14 | End: 2022-06-23

## 2022-03-14 NOTE — TELEPHONE ENCOUNTER
Zofran  Prescription approved per [facility] Refill Protocol.    Renea Gagnon RN

## 2022-03-16 NOTE — TELEPHONE ENCOUNTER
Xanax      Last Written Prescription Date:  02/14/2022  Last Fill Quantity: 60,   # refills: 0  Last Office Visit: 12/02/2021  Future Office visit:    Next 5 appointments (look out 90 days)      Mar 22, 2022  1:40 PM  (Arrive by 1:25 PM)  Return Visit with Dwaine Patterson MD  [facility] ([facility]) [address] [place] [id number] [phone]        Routing refill request to provider for review/approval because:  Drug not on the [facility], [facility] or [facility] refill protocol or controlled substance    Juana Junior RN

## 2022-03-17 RX ORDER — ALPRAZOLAM 0.5 MG
TABLET ORAL
Qty: 60 TABLET | Refills: 0 | Status: SHIPPED | OUTPATIENT
Start: 2022-03-17 | End: 2022-04-22

## 2022-03-23 ENCOUNTER — HOSPITAL ENCOUNTER (OUTPATIENT)
Dept: ULTRASOUND IMAGING | Facility: CLINIC | Age: 47
Discharge: HOME | End: 2022-03-23
Attending: STUDENT IN AN ORGANIZED HEALTH CARE EDUCATION/TRAINING PROGRAM
Payer: COMMERCIAL

## 2022-03-23 ENCOUNTER — HOSPITAL ENCOUNTER (OUTPATIENT)
Dept: MAMMOGRAPHY | Facility: CLINIC | Age: 47
Discharge: HOME | End: 2022-03-23
Attending: STUDENT IN AN ORGANIZED HEALTH CARE EDUCATION/TRAINING PROGRAM
Payer: COMMERCIAL

## 2022-03-23 DIAGNOSIS — R92.8 ABNORMAL MAMMOGRAM: ICD-10-CM

## 2022-03-23 PROCEDURE — 76642 ULTRASOUND BREAST LIMITED: CPT | Mod: RT

## 2022-03-23 PROCEDURE — 77061 BREAST TOMOSYNTHESIS UNI: CPT | Mod: RT

## 2022-03-30 ENCOUNTER — HOSPITAL ENCOUNTER (OUTPATIENT)
Dept: PHYSICAL THERAPY | Facility: CLINIC | Age: 47
Setting detail: THERAPIES SERIES
Discharge: HOME | End: 2022-03-30
Attending: FAMILY MEDICINE
Payer: COMMERCIAL

## 2022-03-30 DIAGNOSIS — F41.9 ANXIETY: ICD-10-CM

## 2022-03-30 DIAGNOSIS — G25.81 RESTLESS LEGS SYNDROME (RLS): ICD-10-CM

## 2022-03-30 DIAGNOSIS — F51.04 PSYCHOPHYSIOLOGICAL INSOMNIA: ICD-10-CM

## 2022-03-30 PROCEDURE — 97112 NEUROMUSCULAR REEDUCATION: CPT | Mod: GP | Performed by: PHYSICAL THERAPIST

## 2022-03-30 NOTE — TELEPHONE ENCOUNTER
Routing refill request to provider for review/approval because:  Labs not current:    LDL Cholesterol Calculated   Date Value Ref Range Status   02/11/2019  <100 mg/dL Final    Cannot estimate LDL when triglyceride exceeds 400 mg/dL     LDL Cholesterol Direct   Date Value Ref Range Status   03/02/2021 133 (H) <100 mg/dL Final     Comment:     Above desirable:  100-129 mg/dl  Borderline High:  130-159 mg/dL  High:             160-189 mg/dL  Very high:       >189 mg/dl

## 2022-03-30 NOTE — TELEPHONE ENCOUNTER
Routing refill request to provider for review/approval because:  Drug not on the [facility] refill protocol   Patient needs to be seen because:  per last OV pt to follow up in 3/2022. No appt scheduled

## 2022-04-02 RX ORDER — QUETIAPINE FUMARATE 200 MG/1
200 TABLET, FILM COATED ORAL AT BEDTIME
Qty: 90 TABLET | Refills: 3 | Status: SHIPPED | OUTPATIENT
Start: 2022-04-02 | End: 2023-04-18

## 2022-04-02 RX ORDER — ROPINIROLE 1 MG/1
TABLET, FILM COATED ORAL
Qty: 90 TABLET | Refills: 0 | Status: SHIPPED | OUTPATIENT
Start: 2022-04-02 | End: 2022-06-24

## 2022-04-02 RX ORDER — ZOLPIDEM TARTRATE 5 MG/1
5 TABLET ORAL
Qty: 30 TABLET | Refills: 0 | Status: SHIPPED | OUTPATIENT
Start: 2022-04-02 | End: 2022-05-12

## 2022-04-06 ENCOUNTER — HOSPITAL ENCOUNTER (OUTPATIENT)
Dept: PHYSICAL THERAPY | Facility: CLINIC | Age: 47
Setting detail: THERAPIES SERIES
Discharge: HOME | End: 2022-04-06
Attending: FAMILY MEDICINE
Payer: COMMERCIAL

## 2022-04-06 DIAGNOSIS — M54.2 CHRONIC NECK PAIN: ICD-10-CM

## 2022-04-06 DIAGNOSIS — G89.29 CHRONIC NECK PAIN: ICD-10-CM

## 2022-04-06 PROCEDURE — 97140 MANUAL THERAPY 1/> REGIONS: CPT | Mod: GP | Performed by: PHYSICAL THERAPIST

## 2022-04-06 NOTE — TELEPHONE ENCOUNTER
Routing refill request to provider for review/approval because:  Drug not on the [facility] refill protocol   Verified with pharmacy that pt is out of medication.   Pt filled 3x in Jan, 2x in Feb and once in March     Mercedes Mercado, RN, BSN

## 2022-04-07 RX ORDER — CYCLOBENZAPRINE HCL 5 MG
TABLET ORAL
Qty: 90 TABLET | Refills: 5 | Status: SHIPPED | OUTPATIENT
Start: 2022-04-07 | End: 2022-07-14

## 2022-04-11 ENCOUNTER — HOSPITAL ENCOUNTER (OUTPATIENT)
Dept: PHYSICAL THERAPY | Facility: CLINIC | Age: 47
Setting detail: THERAPIES SERIES
Discharge: HOME | End: 2022-04-11
Attending: FAMILY MEDICINE
Payer: COMMERCIAL

## 2022-04-11 PROCEDURE — 97112 NEUROMUSCULAR REEDUCATION: CPT | Mod: GP | Performed by: PHYSICAL THERAPIST

## 2022-04-13 NOTE — PROGRESS NOTES
[facility]    OUTPATIENT PHYSICAL THERAPY  PLAN OF TREATMENT FOR OUTPATIENT REHABILITATION AND PROGRESS NOTE           Patient's Last Name, First Name, Radha Ruiz Date of Birth  1975   Provider's Name  [facility] Medical Record No.  [id number]    Onset Date  09/27/21 (went to [facility] on 9-) Start of Care Date  11-   Type:     _X_PT   ___OT   ___SLP Medical Diagnosis  Chronic neck pain M54.2, G89.29 Chronic midline low back pain without sciatica M54.50, G89.29    PT Diagnosis  Neck and back pain with positive SIJ tests Plan of Treatment  Frequency/Duration: 6 sessions x 6 weeks  Certification date from 3-  to 4-     Goals:  Goal Identifier Walking   Goal Description Lniette will be able to walk on level and stairs with railings as needed x 15 minutes with minimal increase in symptoms for getting round the community and her home   Target Date 04/20/22   Date Met      Progress (detail required for progress note): Walking: 15-30 minutes and symptoms can get quite severe at 10/10 (end of walking), stairs: awful due to bad knees, morning one step at a time and afternoon reciprocally using railings     Goal Identifier Driving   Goal Description Linette will be able to turn her head to look over shoulders when driving for safety as noted with increase of cervical rotation to 40 degrees (has not been driving x 2 w, last reported some ease w/rot)   Target Date 04/30/22   Date Met      Progress (detail required for progress note): has not been driving, feels this has improved (ROM better)     Goal Identifier Home program   Goal Description Linette will be able to complete her home program as instructed to improve her strength, stabilization and ROM to improve her function as noted with a 20 point improvement on  NDI and ALBERT with MICHELLE decreasing to medium   Target Date 03/30/22   Date Met      Progress (detail required for progress note): Completing neck ROM. Back symptoms are increasing and hard to complete exercises     Goal Identifier Back pain   Goal Description Linette would like to get rid of her back pain so she can do everything   Target Date 08/31/22   Date Met      Progress (detail required for progress note): Personal goal and she has started core and posture work to progess toward this     Beginning/End Dates of Progress Note Reporting Period:  1 session 3- for a total of 14    Progress Toward Goals:   Progress this reporting period: Linette has not had siginificant improvement in her symptoms. She has been trying to work on her exercises and proper mechanics for home tasks. She feels her neck symptoms have actually decreased. She is noting an increase in low back symptoms. She feels the more active she is the more she hurts. She does feel the manual therapy was helping. She has significant tightness and increased in her lumbar lordosis. Her TAs are weak and she is too tight to complete pelvic tilts posterior. Skilled intervention to advance her exercises for conditioning and education for functional activities.    Client Self (Subjective) Report for Progress Note Reporting Period: Neck is better. More in low back (from mid glute up back especially on the R). Walking: triggers symptoms steps and level, rising from chair,  and stretches helps.    Objective Measurements:   Objective Measure: Symptoms  Details: Neck and headache not too bad today most symptoms R low back into gluteal area and up to mid thoracic and reports this bilaterally.       Objective Measure: NDI (initial score from 1-4-2022: 10-34-34-68)  Details: 10-31-31-62                        I CERTIFY THE NEED FOR THESE SERVICES FURNISHED UNDER        THIS PLAN OF TREATMENT AND WHILE UNDER MY CARE .             Physician Signature                Date    X_____________________________________________________                      Referring Provider: Wood Peacock, PT

## 2022-04-21 DIAGNOSIS — F41.0 PANIC ATTACKS: ICD-10-CM

## 2022-04-22 ENCOUNTER — HOSPITAL ENCOUNTER (OUTPATIENT)
Dept: PHYSICAL THERAPY | Facility: CLINIC | Age: 47
Setting detail: THERAPIES SERIES
Discharge: HOME | End: 2022-04-22
Attending: FAMILY MEDICINE
Payer: COMMERCIAL

## 2022-04-22 PROCEDURE — 97110 THERAPEUTIC EXERCISES: CPT | Mod: GP | Performed by: PHYSICAL THERAPIST

## 2022-04-22 RX ORDER — ALPRAZOLAM 0.5 MG
TABLET ORAL
Qty: 60 TABLET | Refills: 0 | Status: SHIPPED | OUTPATIENT
Start: 2022-04-22 | End: 2022-05-20

## 2022-04-25 ENCOUNTER — HOSPITAL ENCOUNTER (OUTPATIENT)
Dept: PHYSICAL THERAPY | Facility: CLINIC | Age: 47
Setting detail: THERAPIES SERIES
Discharge: HOME | End: 2022-04-25
Attending: FAMILY MEDICINE
Payer: COMMERCIAL

## 2022-04-25 PROCEDURE — 97110 THERAPEUTIC EXERCISES: CPT | Mod: GP | Performed by: PHYSICAL THERAPIST

## 2022-04-26 ENCOUNTER — MEDICAL CORRESPONDENCE (OUTPATIENT)
Dept: HEALTH INFORMATION MANAGEMENT | Facility: CLINIC | Age: 47
End: 2022-04-26
Payer: COMMERCIAL

## 2022-04-28 ENCOUNTER — HOSPITAL ENCOUNTER (OUTPATIENT)
Facility: CLINIC | Age: 47
End: 2022-04-28
Attending: ANESTHESIOLOGY | Admitting: ANESTHESIOLOGY
Payer: COMMERCIAL

## 2022-04-28 ENCOUNTER — TELEPHONE (OUTPATIENT)
Dept: PALLIATIVE MEDICINE | Facility: CLINIC | Age: 47
End: 2022-04-28
Payer: COMMERCIAL

## 2022-04-28 ENCOUNTER — HOSPITAL ENCOUNTER (OUTPATIENT)
Dept: PHYSICAL THERAPY | Facility: CLINIC | Age: 47
Setting detail: THERAPIES SERIES
Discharge: HOME | End: 2022-04-28
Attending: FAMILY MEDICINE
Payer: COMMERCIAL

## 2022-04-28 DIAGNOSIS — M54.16 LUMBAR RADICULOPATHY: Primary | ICD-10-CM

## 2022-04-28 PROCEDURE — 97140 MANUAL THERAPY 1/> REGIONS: CPT | Mod: GP | Performed by: PHYSICAL THERAPIST

## 2022-04-28 PROCEDURE — 97110 THERAPEUTIC EXERCISES: CPT | Mod: GP | Performed by: PHYSICAL THERAPIST

## 2022-04-28 NOTE — TELEPHONE ENCOUNTER
Pt scheduled for JASE  Date: 5/20/22  Time: 0830  Dr. Ivory    Instructed pt to have H&P and  for procedure.  Patient informed of COVID testing process.

## 2022-04-29 DIAGNOSIS — Z11.59 ENCOUNTER FOR SCREENING FOR OTHER VIRAL DISEASES: Primary | ICD-10-CM

## 2022-05-11 DIAGNOSIS — J30.2 SEASONAL ALLERGIC RHINITIS, UNSPECIFIED TRIGGER: ICD-10-CM

## 2022-05-11 DIAGNOSIS — F51.04 PSYCHOPHYSIOLOGICAL INSOMNIA: ICD-10-CM

## 2022-05-11 NOTE — TELEPHONE ENCOUNTER
Ambien      Last Written Prescription Date:  04/02/2022  Last Fill Quantity: 30,   # refills: 0  Last Office Visit: 12/02/2021  Future Office visit:    Next 5 appointments (look out 90 days)      May 17, 2022 10:00 AM  (Arrive by 9:40 AM)  Pre-Operative Physical with Fre Adame MD  [facility] ([facility] ) [address] [place] [id number]  [phone]     May 17, 2022 12:20 PM  Pre-procedure Covid with PH COVID LAB  [facility] ([facility] ) [address] [place] [id number]  [phone]        Routing refill request to provider for review/approval because:  Drug not on the [facility], [facility] or [facility] refill protocol or controlled substance    Juana Junior RN

## 2022-05-12 RX ORDER — ZOLPIDEM TARTRATE 5 MG/1
5 TABLET ORAL
Qty: 30 TABLET | Refills: 0 | Status: SHIPPED | OUTPATIENT
Start: 2022-05-12 | End: 2022-06-13

## 2022-05-12 RX ORDER — CETIRIZINE HYDROCHLORIDE 10 MG/1
10 TABLET ORAL EVERY MORNING
Qty: 90 TABLET | Refills: 1 | Status: SHIPPED | OUTPATIENT
Start: 2022-05-12 | End: 2023-01-24

## 2022-05-12 NOTE — TELEPHONE ENCOUNTER
Prescription approved per [facility] Refill Protocol.    Phuong Antonio, BSN, RN

## 2022-05-17 NOTE — TELEPHONE ENCOUNTER
Patient took home covid test which came back positive.  Patient will reschedule when symptoms subside.

## 2022-05-19 DIAGNOSIS — F41.0 PANIC ATTACKS: ICD-10-CM

## 2022-05-19 NOTE — TELEPHONE ENCOUNTER
Xanax      Last Written Prescription Date:  04/22/2022  Last Fill Quantity: 60,   # refills: 0  Last Office Visit: 12/02/2022  Future Office visit:   None  Routing refill request to provider for review/approval because:  Drug not on the [facility], [facility] or [facility] refill protocol or controlled substance    Juana Junior Rn

## 2022-05-20 RX ORDER — ALPRAZOLAM 0.5 MG
TABLET ORAL
Qty: 60 TABLET | Refills: 0 | Status: SHIPPED | OUTPATIENT
Start: 2022-05-20 | End: 2022-06-24

## 2022-05-31 ENCOUNTER — TELEPHONE (OUTPATIENT)
Dept: PHYSICAL THERAPY | Facility: OTHER | Age: 47
End: 2022-05-31
Payer: COMMERCIAL

## 2022-06-02 ENCOUNTER — OFFICE VISIT (OUTPATIENT)
Dept: AUDIOLOGY | Facility: CLINIC | Age: 47
End: 2022-06-02
Payer: COMMERCIAL

## 2022-06-02 ENCOUNTER — OFFICE VISIT (OUTPATIENT)
Dept: OTOLARYNGOLOGY | Facility: CLINIC | Age: 47
End: 2022-06-02

## 2022-06-02 VITALS
WEIGHT: 168 LBS | HEIGHT: 64 IN | DIASTOLIC BLOOD PRESSURE: 62 MMHG | TEMPERATURE: 97.7 F | BODY MASS INDEX: 28.68 KG/M2 | SYSTOLIC BLOOD PRESSURE: 114 MMHG

## 2022-06-02 DIAGNOSIS — H90.3 SENSORINEURAL HEARING LOSS, BILATERAL: Primary | ICD-10-CM

## 2022-06-02 DIAGNOSIS — L29.9 EAR ITCH: ICD-10-CM

## 2022-06-02 DIAGNOSIS — H90.3 SENSORINEURAL HEARING LOSS, BILATERAL: ICD-10-CM

## 2022-06-02 DIAGNOSIS — R42 DISEQUILIBRIUM: Primary | ICD-10-CM

## 2022-06-02 PROCEDURE — 92550 TYMPANOMETRY & REFLEX THRESH: CPT | Performed by: AUDIOLOGIST

## 2022-06-02 PROCEDURE — 92557 COMPREHENSIVE HEARING TEST: CPT | Performed by: AUDIOLOGIST

## 2022-06-02 PROCEDURE — 99207 PR NO CHARGE LOS: CPT | Performed by: AUDIOLOGIST

## 2022-06-02 PROCEDURE — 99203 OFFICE O/P NEW LOW 30 MIN: CPT | Performed by: OTOLARYNGOLOGY

## 2022-06-02 NOTE — LETTER
6/2/2022         RE: Radha Beckwith  [id number] [facility] [address]        Dear Colleague,    Thank you for referring your patient, Radha Beckwith, to the [facility]. Please see a copy of my visit note below.    ENT Consultation    Radha Beckwith who is a 46 year old female seen in consultation at the request of self.      History of Present Illness - Radha Beckwith is a 46 year old female presents with a chief complaint of persistent hearing loss.  Patient has worn hearing aids for at least 5 years now.  Tinnitus appears to be episodic short-lived bilateral.  It is nonpulsatile high-frequency.  Patient's other complaint is of itching both ears without any discharge.  No other history of eczema or psoriasis.  Patient is currently also dealing with increased falls and feeling off balance unsteady without symptoms of vertigo.  She is scheduled to see neurologist to further evaluate this and other neurologic issues.      BP Readings from Last 1 Encounters:   12/02/21 118/68       BP noted to be well controlled today in office.     Radha IS  a smoker/not ready to quit.        Past Medical History -   Past Medical History:   Diagnosis Date     Abdominal pain, right lower quadrant 03/09/08    Admit. Discharged 03/10/08     Anxiety attack 7/31/2015     Atypical chest pain 6/23/2015     Dehydration      Gastric ulcer 7/31/2015     GERD (gastroesophageal reflux disease) 7/28/2010    Takes omeprazole and metoclopramide      Ingrowing nail 1/9/2014     Opioid dependence in remission (H) 8/2/2015     Other and unspecified ovarian cyst      Papanicolaou smear of cervix with low grade squamous intraepithelial lesion (LGSIL) 07/07/2017       Current Medications -   Current Outpatient Medications:      acetaminophen (TYLENOL) 500 MG tablet, Take 1,000 mg by mouth every 6 hours as needed for mild pain, Disp: , Rfl:      ALPRAZolam (XANAX) 0.5 MG tablet, TAKE ONE TABLET BY MOUTH  TWICE A DAY AS NEEDED FOR PANIC ATTACK. USE SPARINGLY, Disp: 60 tablet, Rfl: 0     buprenorphine HCl-naloxone HCl (SUBOXONE) 2-0.5 MG per film, Place 0.5 Film under the tongue daily , Disp: , Rfl:      cetirizine (ZYRTEC) 10 MG tablet, TAKE 1 TABLET (10 MG) BY MOUTH EVERY MORNING, Disp: 90 tablet, Rfl: 1     cyclobenzaprine (FLEXERIL) 5 MG tablet, TAKE ONE TO TWO TABLETS BY MOUTH THREE TIMES A DAY AS NEEDED FOR MUSCLE SPASMS, Disp: 90 tablet, Rfl: 5     DULoxetine (CYMBALTA) 20 MG capsule, Take 1 capsule (20 mg) by mouth 2 times daily, Disp: 180 capsule, Rfl: 3     DULoxetine (CYMBALTA) 30 MG capsule, Take 1 capsule (30 mg) by mouth 2 times daily, Disp: 180 capsule, Rfl: 0     famotidine (PEPCID) 40 MG tablet, Take 1 tablet (40 mg) by mouth daily (Patient taking differently: Take 40-80 mg by mouth daily as needed for heartburn ), Disp: 30 tablet, Rfl: 11     lactulose (CHRONULAC) 10 GM/15ML solution, Take 10 g by mouth every 6 hours as needed for constipation, Disp: , Rfl:      Melatonin 10 MG TABS tablet, Take 10 mg by mouth nightly as needed for sleep, Disp: , Rfl:      omeprazole (PRILOSEC) 40 MG DR capsule, TAKE ONE CAPSULE BY MOUTH ONCE DAILY (Patient taking differently: Take 40 mg by mouth daily ), Disp: 30 capsule, Rfl: 11     ondansetron (ZOFRAN-ODT) 4 MG ODT tab, DISSOLVE ONE TABLET ON TONGUE EVERY 6 HOURS AS NEEDED FOR NAUSEA, Disp: 20 tablet, Rfl: 1     propranolol (INDERAL) 20 MG tablet, Take 1 tablet (20 mg) by mouth 2 times daily, Disp: 180 tablet, Rfl: 3     QUEtiapine (SEROQUEL) 200 MG tablet, TAKE 1 TABLET (200 MG) BY MOUTH AT BEDTIME, Disp: 90 tablet, Rfl: 3     rOPINIRole (REQUIP) 1 MG tablet, TAKE ONE TABLET BY MOUTH EVERY NIGHT AT BEDTIME, Disp: 90 tablet, Rfl: 0     simvastatin (ZOCOR) 10 MG tablet, TAKE ONE TABLET BY MOUTH EVERY NIGHT AT BEDTIME (Patient taking differently: Take 10 mg by mouth At Bedtime ), Disp: 90 tablet, Rfl: 3     zolpidem (AMBIEN) 5 MG tablet, TAKE 1 TABLET (5 MG) BY  MOUTH NIGHTLY AS NEEDED FOR SLEEP, Disp: 30 tablet, Rfl: 0    Allergies -   Allergies   Allergen Reactions     Cafergot      12-            GI problems-     Seasonal Allergies      Sumatriptan      vomits after giving herself a shot     Compazine Anxiety     Droperidol Anxiety     Nubain [Nalbuphine Hcl] Anxiety     Prochlorperazine Palpitations     Uncontrolled movement       Social History -   Social History     Socioeconomic History     Marital status: Single   Tobacco Use     Smoking status: Current Every Day Smoker     Packs/day: 0.50     Years: 20.00     Pack years: 10.00     Types: Cigarettes     Smokeless tobacco: Never Used   Vaping Use     Vaping Use: Some days     Substances: Nicotine, CBD     Devices: Refillable tank   Substance and Sexual Activity     Alcohol use: Yes     Comment: occasional drinks     Drug use: No     Sexual activity: Yes     Partners: Male     Birth control/protection: Female Surgical   Other Topics Concern     Parent/sibling w/ CABG, MI or angioplasty before 65F 55M? No       Family History -   Family History   Problem Relation Age of Onset     Depression Mother      Respiratory Mother      Chronic Obstructive Pulmonary Disease Mother      Cerebrovascular Disease Father         Brain anyeurism     Adrenal Disorder Other      Chronic Obstructive Pulmonary Disease Other      Breast Cancer Cousin        Review of Systems - As per HPI and PMHx, otherwise review of system review of the head and neck negative. Otherwise 10+ review of system is negative    Physical Exam  LMP  (LMP Unknown)   BMI: There is no height or weight on file to calculate BMI.    General - The patient is well nourished and well developed, and appears to have good nutritional status.  Alert and oriented to person and place, answers questions and cooperates with examination appropriately.    SKIN - No suspicious lesions or rashes.  Respiration - No respiratory distress.  Head and Face - Normocephalic and  atraumatic, with no gross asymmetry noted of the contour of the facial features.  The facial nerve is intact, with strong symmetric movements.    Voice and Breathing - The patient was breathing comfortably without the use of accessory muscles. The patients voice was clear and strong, and had appropriate pitch and quality.    Ears - Bilateral pinna and EACs with normal appearing overlying skin.  Canal skin and skin of the meatus also appears to be quite dry without any eczema or discharge.  Tympanic membrane intact with good mobility on pneumatic otoscopy bilaterally. Bony landmarks of the ossicular chain are normal. The tympanic membranes are normal in appearance. No retraction, perforation, or masses.  No fluid or purulence was seen in the external canal or the middle ear.     Eyes - Extraocular movements intact.  Sclera were not icteric or injected, conjunctiva were pink and moist.    Mouth - Examination of the oral cavity showed pink, healthy oral mucosa. No lesions or ulcerations noted.  The tongue was mobile and midline, and the dentition were in good condition.      Throat - The walls of the oropharynx were smooth, pink, moist, symmetric, and had no lesions or ulcerations.  The tonsillar pillars and soft palate were symmetric.  The uvula was midline on elevation.    Neck - Normal midline excursion of the laryngotracheal complex during swallowing.  Full range of motion on passive movement.  Palpation of the occipital, submental, submandibular, internal jugular chain, and supraclavicular nodes did not demonstrate any abnormal lymph nodes or masses.  The carotid pulse was palpable bilaterally.  Palpation of the thyroid was soft and smooth, with no nodules or goiter appreciated.  The trachea was mobile and midline.    Nose - External contour is symmetric, no gross deflection or scars.  Nasal mucosa is pink and moist with no abnormal mucus.  The septum was midline and non-obstructive, turbinates of normal size and  position.  No polyps, masses, or purulence noted on examination.    Neuro - Nonfocal neuro exam is normal, CN 2 through 12 intact, normal gait and muscle tone.      Performed in clinic today:  Audiologic Studies - An audiogram and tympanogram were performed today as part of the evaluation and personally reviewed. The tympanogram shows Type C curves on the right and Type C curves on the left, with Normal canal volumes and middle ear pressures.  The audiogram showed Mild SNHL on the right and Mild SNHLon the left.    Word recognition score 92% on the right and 92% on the left.    A/P - Radha Beckwith is a 46 year old female with bilateral sensorineural hearing loss and itchy ears due to dryness of skin.  In regard to hearing loss patient does wear hearing aids but may need to be checked and adjusted.  She will work with our audiology towards the goal.  She will use little bit baby oil in the external meatus several times a week to improve the itch and increased humidity of the skin.  Encouraged her to keep her appointment with neurology to further evaluate her balance related issues that appear to be central by history and physical exam.      Micha Hough MD        Again, thank you for allowing me to participate in the care of your patient.        Sincerely,        Micha Hough MD, MD

## 2022-06-02 NOTE — PROGRESS NOTES
AUDIOLOGY REPORT     SUMMARY: Audiology visit completed. See audiogram for results.     RECOMMENDATIONS: Follow-up with ENT    Jordan Rodriges Licensed Audiologist #[id number]

## 2022-06-02 NOTE — PROGRESS NOTES
ENT Consultation    Radha Beckwith who is a 46 year old female seen in consultation at the request of self.      History of Present Illness - Radha Beckwith is a 46 year old female presents with a chief complaint of persistent hearing loss.  Patient has worn hearing aids for at least 5 years now.  Tinnitus appears to be episodic short-lived bilateral.  It is nonpulsatile high-frequency.  Patient's other complaint is of itching both ears without any discharge.  No other history of eczema or psoriasis.  Patient is currently also dealing with increased falls and feeling off balance unsteady without symptoms of vertigo.  She is scheduled to see neurologist to further evaluate this and other neurologic issues.      BP Readings from Last 1 Encounters:   12/02/21 118/68       BP noted to be well controlled today in office.     Radha IS  a smoker/not ready to quit.        Past Medical History -   Past Medical History:   Diagnosis Date     Abdominal pain, right lower quadrant 03/09/08    Admit. Discharged 03/10/08     Anxiety attack 7/31/2015     Atypical chest pain 6/23/2015     Dehydration      Gastric ulcer 7/31/2015     GERD (gastroesophageal reflux disease) 7/28/2010    Takes omeprazole and metoclopramide      Ingrowing nail 1/9/2014     Opioid dependence in remission (H) 8/2/2015     Other and unspecified ovarian cyst      Papanicolaou smear of cervix with low grade squamous intraepithelial lesion (LGSIL) 07/07/2017       Current Medications -   Current Outpatient Medications:      acetaminophen (TYLENOL) 500 MG tablet, Take 1,000 mg by mouth every 6 hours as needed for mild pain, Disp: , Rfl:      ALPRAZolam (XANAX) 0.5 MG tablet, TAKE ONE TABLET BY MOUTH TWICE A DAY AS NEEDED FOR PANIC ATTACK. USE SPARINGLY, Disp: 60 tablet, Rfl: 0     buprenorphine HCl-naloxone HCl (SUBOXONE) 2-0.5 MG per film, Place 0.5 Film under the tongue daily , Disp: , Rfl:      cetirizine (ZYRTEC) 10 MG tablet, TAKE 1 TABLET (10  MG) BY MOUTH EVERY MORNING, Disp: 90 tablet, Rfl: 1     cyclobenzaprine (FLEXERIL) 5 MG tablet, TAKE ONE TO TWO TABLETS BY MOUTH THREE TIMES A DAY AS NEEDED FOR MUSCLE SPASMS, Disp: 90 tablet, Rfl: 5     DULoxetine (CYMBALTA) 20 MG capsule, Take 1 capsule (20 mg) by mouth 2 times daily, Disp: 180 capsule, Rfl: 3     DULoxetine (CYMBALTA) 30 MG capsule, Take 1 capsule (30 mg) by mouth 2 times daily, Disp: 180 capsule, Rfl: 0     famotidine (PEPCID) 40 MG tablet, Take 1 tablet (40 mg) by mouth daily (Patient taking differently: Take 40-80 mg by mouth daily as needed for heartburn ), Disp: 30 tablet, Rfl: 11     lactulose (CHRONULAC) 10 GM/15ML solution, Take 10 g by mouth every 6 hours as needed for constipation, Disp: , Rfl:      Melatonin 10 MG TABS tablet, Take 10 mg by mouth nightly as needed for sleep, Disp: , Rfl:      omeprazole (PRILOSEC) 40 MG DR capsule, TAKE ONE CAPSULE BY MOUTH ONCE DAILY (Patient taking differently: Take 40 mg by mouth daily ), Disp: 30 capsule, Rfl: 11     ondansetron (ZOFRAN-ODT) 4 MG ODT tab, DISSOLVE ONE TABLET ON TONGUE EVERY 6 HOURS AS NEEDED FOR NAUSEA, Disp: 20 tablet, Rfl: 1     propranolol (INDERAL) 20 MG tablet, Take 1 tablet (20 mg) by mouth 2 times daily, Disp: 180 tablet, Rfl: 3     QUEtiapine (SEROQUEL) 200 MG tablet, TAKE 1 TABLET (200 MG) BY MOUTH AT BEDTIME, Disp: 90 tablet, Rfl: 3     rOPINIRole (REQUIP) 1 MG tablet, TAKE ONE TABLET BY MOUTH EVERY NIGHT AT BEDTIME, Disp: 90 tablet, Rfl: 0     simvastatin (ZOCOR) 10 MG tablet, TAKE ONE TABLET BY MOUTH EVERY NIGHT AT BEDTIME (Patient taking differently: Take 10 mg by mouth At Bedtime ), Disp: 90 tablet, Rfl: 3     zolpidem (AMBIEN) 5 MG tablet, TAKE 1 TABLET (5 MG) BY MOUTH NIGHTLY AS NEEDED FOR SLEEP, Disp: 30 tablet, Rfl: 0    Allergies -   Allergies   Allergen Reactions     Cafergot      12-            GI problems-     Seasonal Allergies      Sumatriptan      vomits after giving herself a shot     Compazine  Anxiety     Droperidol Anxiety     Nubain [Nalbuphine Hcl] Anxiety     Prochlorperazine Palpitations     Uncontrolled movement       Social History -   Social History     Socioeconomic History     Marital status: Single   Tobacco Use     Smoking status: Current Every Day Smoker     Packs/day: 0.50     Years: 20.00     Pack years: 10.00     Types: Cigarettes     Smokeless tobacco: Never Used   Vaping Use     Vaping Use: Some days     Substances: Nicotine, CBD     Devices: Refillable tank   Substance and Sexual Activity     Alcohol use: Yes     Comment: occasional drinks     Drug use: No     Sexual activity: Yes     Partners: Male     Birth control/protection: Female Surgical   Other Topics Concern     Parent/sibling w/ CABG, MI or angioplasty before 65F 55M? No       Family History -   Family History   Problem Relation Age of Onset     Depression Mother      Respiratory Mother      Chronic Obstructive Pulmonary Disease Mother      Cerebrovascular Disease Father         Brain anyeurism     Adrenal Disorder Other      Chronic Obstructive Pulmonary Disease Other      Breast Cancer Cousin        Review of Systems - As per HPI and PMHx, otherwise review of system review of the head and neck negative. Otherwise 10+ review of system is negative    Physical Exam  LMP  (LMP Unknown)   BMI: There is no height or weight on file to calculate BMI.    General - The patient is well nourished and well developed, and appears to have good nutritional status.  Alert and oriented to person and place, answers questions and cooperates with examination appropriately.    SKIN - No suspicious lesions or rashes.  Respiration - No respiratory distress.  Head and Face - Normocephalic and atraumatic, with no gross asymmetry noted of the contour of the facial features.  The facial nerve is intact, with strong symmetric movements.    Voice and Breathing - The patient was breathing comfortably without the use of accessory muscles. The patients  voice was clear and strong, and had appropriate pitch and quality.    Ears - Bilateral pinna and EACs with normal appearing overlying skin.  Canal skin and skin of the meatus also appears to be quite dry without any eczema or discharge.  Tympanic membrane intact with good mobility on pneumatic otoscopy bilaterally. Bony landmarks of the ossicular chain are normal. The tympanic membranes are normal in appearance. No retraction, perforation, or masses.  No fluid or purulence was seen in the external canal or the middle ear.     Eyes - Extraocular movements intact.  Sclera were not icteric or injected, conjunctiva were pink and moist.    Mouth - Examination of the oral cavity showed pink, healthy oral mucosa. No lesions or ulcerations noted.  The tongue was mobile and midline, and the dentition were in good condition.      Throat - The walls of the oropharynx were smooth, pink, moist, symmetric, and had no lesions or ulcerations.  The tonsillar pillars and soft palate were symmetric.  The uvula was midline on elevation.    Neck - Normal midline excursion of the laryngotracheal complex during swallowing.  Full range of motion on passive movement.  Palpation of the occipital, submental, submandibular, internal jugular chain, and supraclavicular nodes did not demonstrate any abnormal lymph nodes or masses.  The carotid pulse was palpable bilaterally.  Palpation of the thyroid was soft and smooth, with no nodules or goiter appreciated.  The trachea was mobile and midline.    Nose - External contour is symmetric, no gross deflection or scars.  Nasal mucosa is pink and moist with no abnormal mucus.  The septum was midline and non-obstructive, turbinates of normal size and position.  No polyps, masses, or purulence noted on examination.    Neuro - Nonfocal neuro exam is normal, CN 2 through 12 intact, normal gait and muscle tone.      Performed in clinic today:  Audiologic Studies - An audiogram and tympanogram were performed  today as part of the evaluation and personally reviewed. The tympanogram shows Type C curves on the right and Type C curves on the left, with Normal canal volumes and middle ear pressures.  The audiogram showed Mild SNHL on the right and Mild SNHLon the left.    Word recognition score 92% on the right and 92% on the left.    A/P - Radha Beckwith is a 46 year old female with bilateral sensorineural hearing loss and itchy ears due to dryness of skin.  In regard to hearing loss patient does wear hearing aids but may need to be checked and adjusted.  She will work with our audiology towards the goal.  She will use little bit baby oil in the external meatus several times a week to improve the itch and increased humidity of the skin.  Encouraged her to keep her appointment with neurology to further evaluate her balance related issues that appear to be central by history and physical exam.      Micha Hough MD

## 2022-06-08 NOTE — TELEPHONE ENCOUNTER
Pt scheduled for LESI   Date:7/1/22  Time:1pm  Dr. Ivory    Instructed pt to have H&P and  for procedure.  Patient informed of COVID testing process.

## 2022-06-13 DIAGNOSIS — F51.04 PSYCHOPHYSIOLOGICAL INSOMNIA: ICD-10-CM

## 2022-06-13 RX ORDER — ZOLPIDEM TARTRATE 5 MG/1
5 TABLET ORAL
Qty: 30 TABLET | Refills: 0 | Status: SHIPPED | OUTPATIENT
Start: 2022-06-13 | End: 2022-07-18

## 2022-06-13 NOTE — TELEPHONE ENCOUNTER
Requested Prescriptions   Pending Prescriptions Disp Refills     zolpidem (AMBIEN) 5 MG tablet [Pharmacy Med Name: ZOLPIDEM TARTRATE 5MG TABS] 30 tablet 0     Sig: TAKE 1 TABLET (5 MG) BY MOUTH NIGHTLY AS NEEDED FOR SLEEP       There is no refill protocol information for this order        Last Written Prescription Date:  5/12/2022  Last Fill Quantity: 30,  # refills: 0   Last office visit: 12/2/2021 with prescribing provider:     Future Office Visit:

## 2022-06-13 NOTE — TELEPHONE ENCOUNTER
FUTURE VISIT INFORMATION      FUTURE VISIT INFORMATION:    Date: 6/29/2022    Time: 1pm    [place] [facility] [place]FORMATION:    Referring provider:  Gisselle Linn NP     Referring providers clinic:  [facility]     Reason for visit/diagnosis  Memory Loss     RECORDS REQUESTED FROM:       Clinic name Comments Records Status Imaging Status   Internal JOHN Linn-12/2/2021    CT Head-10/2/2015 Epic PACS

## 2022-06-21 DIAGNOSIS — F41.9 ANXIETY: ICD-10-CM

## 2022-06-21 DIAGNOSIS — R11.0 NAUSEA: ICD-10-CM

## 2022-06-21 DIAGNOSIS — G25.81 RESTLESS LEGS SYNDROME (RLS): ICD-10-CM

## 2022-06-21 DIAGNOSIS — F41.0 PANIC ATTACKS: ICD-10-CM

## 2022-06-23 RX ORDER — ONDANSETRON 4 MG/1
TABLET, ORALLY DISINTEGRATING ORAL
Qty: 20 TABLET | Refills: 1 | Status: SHIPPED | OUTPATIENT
Start: 2022-06-23 | End: 2022-09-06

## 2022-06-23 NOTE — TELEPHONE ENCOUNTER
"Pending Prescriptions:                       Disp   Refills    ALPRAZolam (XANAX) 0.5 MG tablet [Pharmacy*60 tab*0        Sig: TAKE ONE TABLET BY MOUTH TWICE A DAY AS NEEDED FOR           PANIC ATTACK. USE SPARINGLY    rOPINIRole (REQUIP) 1 MG tablet [Pharmacy *90 tab*0        Sig: TAKE ONE TABLET BY MOUTH EVERY NIGHT AT BEDTIME    Signed Prescriptions:                        Disp   Refills    ondansetron (ZOFRAN ODT) 4 MG ODT tab      20 tab*1        Sig: DISSOLVE ONE TABLET ON TONGUE EVERY 6 HOURS AS NEEDED           FOR NAUSEA  Authorizing Provider: LORA GONZALES  Ordering User: SPRING STEVENS    Routing refill request to provider for review/approval because:  Drug not on [place] [facility] [place]     Requested Prescriptions   Pending Prescriptions Disp Refills    ALPRAZolam (XANAX) 0.5 MG tablet [Pharmacy Med Name: ALPRAZOLAM 0.5MG TABS] 60 tablet 0     Sig: TAKE ONE TABLET BY MOUTH TWICE A DAY AS NEEDED FOR PANIC ATTACK. USE SPARINGLY        There is no refill protocol information for this order        rOPINIRole (REQUIP) 1 MG tablet [Pharmacy Med Name: ROPINIROLE HCL 1MG TABS] 90 tablet 0     Sig: TAKE ONE TABLET BY MOUTH EVERY NIGHT AT BEDTIME        Antiparkinson's Agents Protocol Failed - 6/21/2022  4:05 PM        Failed - Recent (6 mo) or future (30 days) visit within the authorizing provider's specialty     Patient had office visit in the last 6 months or has a visit in the next 30 days with authorizing provider or within the authorizing provider's specialty.  See \"Patient Info\" tab in inbasket, or \"Choose Columns\" in Meds & Orders section of the refill encounter.            Passed - Blood pressure under 140/90 in past 12 months       BP Readings from Last 3 Encounters:   06/02/22 114/62   12/02/21 118/68   10/05/21 120/80                 Passed - CBC on record in past 12 months     Recent Labs   Lab Test 07/06/21  1153   WBC 10.2   RBC 4.44   HGB 13.6   HCT 40.7                      Passed " "- ALT on record in past 12 months         Recent Labs   Lab Test 07/06/21  1153   ALT 34               Passed - Serum Creatinine on file in past 12 months     Recent Labs   Lab Test 07/06/21  1153   CR 0.75       Ok to refill medication if creatinine is low          Passed - Medication is active on med list        Passed - Patient is age 18 or older        Passed - No active pregnancy on record        Passed - No positive pregnancy test in the past 12 months          Signed Prescriptions Disp Refills    ondansetron (ZOFRAN ODT) 4 MG ODT tab 20 tablet 1     Sig: DISSOLVE ONE TABLET ON TONGUE EVERY 6 HOURS AS NEEDED FOR NAUSEA         Antivertigo/Antiemetic Agents Passed - 6/21/2022  4:05 PM        Passed - Recent (12 mo) or future (30 days) visit within the authorizing provider's specialty     Patient has had an office visit with the authorizing provider or a provider within the authorizing providers department within the previous 12 mos or has a future within next 30 days. See \"Patient Info\" tab in inbasket, or \"Choose Columns\" in Meds & Orders section of the refill encounter.              Passed - Medication is active on med list        Passed - Patient is 18 years of age or older                  "

## 2022-06-24 RX ORDER — ROPINIROLE 1 MG/1
TABLET, FILM COATED ORAL
Qty: 90 TABLET | Refills: 0 | Status: SHIPPED | OUTPATIENT
Start: 2022-06-24 | End: 2022-09-16

## 2022-06-24 RX ORDER — ALPRAZOLAM 0.5 MG
TABLET ORAL
Qty: 60 TABLET | Refills: 0 | Status: SHIPPED | OUTPATIENT
Start: 2022-06-24 | End: 2022-07-27

## 2022-06-27 ASSESSMENT — ANXIETY QUESTIONNAIRES
5. BEING SO RESTLESS THAT IT IS HARD TO SIT STILL: SEVERAL DAYS
2. NOT BEING ABLE TO STOP OR CONTROL WORRYING: SEVERAL DAYS
6. BECOMING EASILY ANNOYED OR IRRITABLE: NEARLY EVERY DAY
7. FEELING AFRAID AS IF SOMETHING AWFUL MIGHT HAPPEN: SEVERAL DAYS
1. FEELING NERVOUS, ANXIOUS, OR ON EDGE: NEARLY EVERY DAY
4. TROUBLE RELAXING: SEVERAL DAYS
3. WORRYING TOO MUCH ABOUT DIFFERENT THINGS: MORE THAN HALF THE DAYS
7. FEELING AFRAID AS IF SOMETHING AWFUL MIGHT HAPPEN: SEVERAL DAYS
8. IF YOU CHECKED OFF ANY PROBLEMS, HOW DIFFICULT HAVE THESE MADE IT FOR YOU TO DO YOUR WORK, TAKE CARE OF THINGS AT HOME, OR GET ALONG WITH OTHER PEOPLE?: VERY DIFFICULT
GAD7 TOTAL SCORE: 12

## 2022-06-27 ASSESSMENT — PATIENT HEALTH QUESTIONNAIRE - PHQ9
10. IF YOU CHECKED OFF ANY PROBLEMS, HOW DIFFICULT HAVE THESE PROBLEMS MADE IT FOR YOU TO DO YOUR WORK, TAKE CARE OF THINGS AT HOME, OR GET ALONG WITH OTHER PEOPLE: VERY DIFFICULT
SUM OF ALL RESPONSES TO PHQ QUESTIONS 1-9: 12
SUM OF ALL RESPONSES TO PHQ QUESTIONS 1-9: 12

## 2022-06-28 ENCOUNTER — OFFICE VISIT (OUTPATIENT)
Dept: AUDIOLOGY | Facility: CLINIC | Age: 47
End: 2022-06-28
Payer: COMMERCIAL

## 2022-06-28 DIAGNOSIS — H90.3 SENSORINEURAL HEARING LOSS, BILATERAL: Primary | ICD-10-CM

## 2022-06-28 PROCEDURE — 92591 PR HEARING AID EXAM BINAURAL: CPT | Performed by: AUDIOLOGIST

## 2022-06-28 PROCEDURE — 99207 PR NO CHARGE LOS: CPT | Performed by: AUDIOLOGIST

## 2022-06-28 NOTE — PROGRESS NOTES
AUDIOLOGY REPORT    SUBJECTIVE: Radha Beckwith is a 46 year old female was seen in the Audiology Clinic at  [facility] on 6/28/22 to discuss concerns with hearing and functional communication difficulties. The patient was unaccompanied. Radha has been seen previously on 6/2/2022, and results revealed a normal sloping to moderately severe sensorineural hearing loss bilaterally. She is currently using binaural Oticon Nera Pro miniRITE devices.  The patient was medically evaluated and determined to be cleared for binaural hearing aids by MARILU Hough M.D. Radha notes difficulty with communication in a variety of listening situations, including masks and plexiglass barriers, especially with soft spoken talkers. Needs TV volume up and relies on captions.    OBJECTIVE:  Patient is a hearing aid candidate. Patient would like to move forward with a hearing aid evaluation today. Therefore, the patient was presented with different options for amplification to help aid in communication. Discussed styles, levels of technology and monaural vs. binaural fitting.     The hearing aid(s) mutually chosen were:  Binaural: Signia Pure Charge & Go 5 AX  COLOR: Karma Brown  BATTERY SIZE: rechargeable  EARMOLD/TIPS: Small Sleeve  CANAL/ LENGTH: 1    Otoscopy revealed ears are clear of cerumen bilaterally.    ASSESSMENT:     ICD-10-CM    1. Sensorineural hearing loss, bilateral  H90.3 Hearing Aid Exam, Binaural ([id number])       Reviewed purchase information and warranty information with patient. The 45 day trial period was explained to patient. The patient was given a copy of the [place] [employer] consumer brochure on purchasing hearing instruments. Patient risk factors have been provided to the patient in writing prior to the sale of the hearing aid per FDA regulation. The risk factors are also available in the User Instructional Booklet to be presented on the day of the hearing aid  fitting. Hearing aid(s) ordered. Hearing aid evaluation completed.    PLAN:   Radha is scheduled to return in 2-3 weeks for a hearing aid fitting and programming. Purchase agreement will be completed on that date. Please contact this clinic with any questions or concerns.      Vivienne Rodriges.  [place] Licensed Audiologist #[id number]

## 2022-06-29 ENCOUNTER — OFFICE VISIT (OUTPATIENT)
Dept: NEUROLOGY | Facility: CLINIC | Age: 47
End: 2022-06-29
Attending: NURSE PRACTITIONER
Payer: COMMERCIAL

## 2022-06-29 ENCOUNTER — OFFICE VISIT (OUTPATIENT)
Dept: INTERNAL MEDICINE | Facility: CLINIC | Age: 47
End: 2022-06-29
Payer: COMMERCIAL

## 2022-06-29 ENCOUNTER — PRE VISIT (OUTPATIENT)
Dept: NEUROLOGY | Facility: CLINIC | Age: 47
End: 2022-06-29

## 2022-06-29 VITALS
BODY MASS INDEX: 29.7 KG/M2 | WEIGHT: 173 LBS | OXYGEN SATURATION: 97 % | RESPIRATION RATE: 10 BRPM | DIASTOLIC BLOOD PRESSURE: 60 MMHG | SYSTOLIC BLOOD PRESSURE: 124 MMHG | HEART RATE: 110 BPM | TEMPERATURE: 97.2 F

## 2022-06-29 VITALS
SYSTOLIC BLOOD PRESSURE: 143 MMHG | WEIGHT: 173 LBS | DIASTOLIC BLOOD PRESSURE: 87 MMHG | HEART RATE: 101 BPM | BODY MASS INDEX: 29.7 KG/M2

## 2022-06-29 DIAGNOSIS — E87.6 HYPOKALEMIA: ICD-10-CM

## 2022-06-29 DIAGNOSIS — R41.3 MEMORY LOSS: ICD-10-CM

## 2022-06-29 DIAGNOSIS — G89.29 CHRONIC BILATERAL LOW BACK PAIN WITH BILATERAL SCIATICA: ICD-10-CM

## 2022-06-29 DIAGNOSIS — Z11.59 NEED FOR HEPATITIS C SCREENING TEST: ICD-10-CM

## 2022-06-29 DIAGNOSIS — M54.42 CHRONIC BILATERAL LOW BACK PAIN WITH BILATERAL SCIATICA: ICD-10-CM

## 2022-06-29 DIAGNOSIS — F11.21 OPIOID DEPENDENCE IN REMISSION (H): ICD-10-CM

## 2022-06-29 DIAGNOSIS — E78.5 HYPERLIPIDEMIA LDL GOAL <100: ICD-10-CM

## 2022-06-29 DIAGNOSIS — Z01.818 PREOP GENERAL PHYSICAL EXAM: ICD-10-CM

## 2022-06-29 DIAGNOSIS — M54.41 CHRONIC BILATERAL LOW BACK PAIN WITH BILATERAL SCIATICA: ICD-10-CM

## 2022-06-29 DIAGNOSIS — I47.10 SUPRAVENTRICULAR TACHYCARDIA (H): ICD-10-CM

## 2022-06-29 DIAGNOSIS — Z79.899 ENCOUNTER FOR LONG-TERM (CURRENT) USE OF MEDICATIONS: ICD-10-CM

## 2022-06-29 DIAGNOSIS — F33.1 MODERATE EPISODE OF RECURRENT MAJOR DEPRESSIVE DISORDER (H): ICD-10-CM

## 2022-06-29 DIAGNOSIS — Z12.11 SCREEN FOR COLON CANCER: Primary | ICD-10-CM

## 2022-06-29 PROBLEM — Z21 POSITIVE LABORATORY TESTING FOR HUMAN IMMUNODEFICIENCY VIRUS (H): Status: RESOLVED | Noted: 2022-06-29 | Resolved: 2022-06-29

## 2022-06-29 PROBLEM — Z21 POSITIVE LABORATORY TESTING FOR HUMAN IMMUNODEFICIENCY VIRUS (H): Status: ACTIVE | Noted: 2022-06-29

## 2022-06-29 LAB
ANION GAP SERPL CALCULATED.3IONS-SCNC: 5 MMOL/L (ref 3–14)
BUN SERPL-MCNC: 9 MG/DL (ref 7–30)
CALCIUM SERPL-MCNC: 8.2 MG/DL (ref 8.5–10.1)
CHLORIDE BLD-SCNC: 106 MMOL/L (ref 94–109)
CHOLEST SERPL-MCNC: 280 MG/DL
CO2 SERPL-SCNC: 30 MMOL/L (ref 20–32)
CREAT SERPL-MCNC: 0.68 MG/DL (ref 0.52–1.04)
FASTING STATUS PATIENT QL REPORTED: YES
GFR SERPL CREATININE-BSD FRML MDRD: >90 ML/MIN/1.73M2
GLUCOSE BLD-MCNC: 114 MG/DL (ref 70–99)
HCV AB SERPL QL IA: NONREACTIVE
HDLC SERPL-MCNC: 30 MG/DL
LDLC SERPL CALC-MCNC: 140 MG/DL
LDLC SERPL CALC-MCNC: ABNORMAL MG/DL
NONHDLC SERPL-MCNC: 250 MG/DL
POTASSIUM BLD-SCNC: 3.4 MMOL/L (ref 3.4–5.3)
SODIUM SERPL-SCNC: 141 MMOL/L (ref 133–144)
TRIGL SERPL-MCNC: 1220 MG/DL
TSH SERPL DL<=0.005 MIU/L-ACNC: 3.51 MU/L (ref 0.4–4)
VIT B12 SERPL-MCNC: 519 PG/ML (ref 232–1245)

## 2022-06-29 PROCEDURE — 83721 ASSAY OF BLOOD LIPOPROTEIN: CPT | Mod: 59 | Performed by: INTERNAL MEDICINE

## 2022-06-29 PROCEDURE — 80048 BASIC METABOLIC PNL TOTAL CA: CPT | Performed by: INTERNAL MEDICINE

## 2022-06-29 PROCEDURE — 82607 VITAMIN B-12: CPT | Performed by: INTERNAL MEDICINE

## 2022-06-29 PROCEDURE — 80061 LIPID PANEL: CPT | Performed by: INTERNAL MEDICINE

## 2022-06-29 PROCEDURE — 99214 OFFICE O/P EST MOD 30 MIN: CPT | Performed by: INTERNAL MEDICINE

## 2022-06-29 PROCEDURE — 99205 OFFICE O/P NEW HI 60 MIN: CPT | Performed by: INTERNAL MEDICINE

## 2022-06-29 PROCEDURE — 36415 COLL VENOUS BLD VENIPUNCTURE: CPT | Performed by: INTERNAL MEDICINE

## 2022-06-29 PROCEDURE — 86803 HEPATITIS C AB TEST: CPT | Performed by: INTERNAL MEDICINE

## 2022-06-29 PROCEDURE — 84443 ASSAY THYROID STIM HORMONE: CPT | Performed by: INTERNAL MEDICINE
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
## 2022-06-29 NOTE — PATIENT INSTRUCTIONS
We will schedule an MRI of your brain to look for a structural cause of memory problems    We are going to be checking your B12 and thyroid levels on your recent labs    We'll reach out to you with the results of the MRI and the blood tests    Grief and chronic pain can often cause inattention and secondary memory problems. I would like you schedule a visit with your therapist as you are planning on doing.     Keep working with your doctors on controlling the back pain    You could consider talking with your primary care doctor about weaning down on the Seroquel in case this is causing day time fogginess      If things aren't improving in the future we could consider doing neuropsychology testing (3 hour long cognitive testing)
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
## 2022-06-29 NOTE — H&P (VIEW-ONLY)
[address] [address]  Phone: [phone]  Primary Provider: Gisselle Linn  Pre-op Performing Provider: DLEMI TREVIÑO      PREOPERATIVE EVALUATION:  Today's date: 6/29/2022    Radha Beckwith is a 46 year old female who presents for a preoperative evaluation.    Surgical Information:  Surgery/Procedure: Bilateral Lumbar 5-Sacral 1 Epidural Injection  Surgery Location: [facility]   Surgeon: Dianelys  Surgery Date: 7/1/2022  Time of Surgery: TBD  Where patient plans to recover: At home with family  Fax number for surgical facility: Note does not need to be faxed, will be available electronically in Epic.    Type of Anesthesia Anticipated: Epidural    Assessment & Plan     The proposed surgical procedure is considered LOW risk.    Preop general physical exam    Chronic bilateral low back pain with bilateral sciatica    Encounter for long-term (current) use of medications    Moderate episode of recurrent major depressive disorder (H)    Opioid dependence in remission (H)  - [id number] - Urine Drug Confirmation Panel (Comprehensive); Future      Still on buprenorphine.        Supraventricular tachycardia (H)    Hyperlipidemia LDL goal <100    Hypokalemia  - BASIC METABOLIC PANEL; Future    Screen for colon cancer  - GASTROENTEROLOGY ADULT REF PROCEDURE ONLY; Future    Need for hepatitis C screening test  - Hepatitis C Screen Reflex to HCV RNA Quant and Genotype; Future    Possible Sleep Apnea: Patient illustrated, patient is at an increased risk for obstructive sleep apnea.       Risks and Recommendations:  The patient has the following additional risks and recommendations for perioperative complications:   - No identified additional risk factors other than previously addressed    Medication Instructions:  Patient will take all of her medications as scheduled morning of the procedure.    RECOMMENDATION:  APPROVAL GIVEN to proceed with  proposed procedure, without further diagnostic evaluation.    Review of external notes as documented above         Subjective     HPI related to upcoming procedure: Patient has chronic low back pain with radicular discomfort into both legs.  Has tried conservative therapy such as physical therapy with no significant benefit.  Is now anticipating epidural steroid injection.    Preop Questions 6/27/2022   1. Have you ever had a heart attack or stroke? No   2. Have you ever had surgery on your heart or blood vessels, such as a stent placement, a coronary artery bypass, or surgery on an artery in your head, neck, heart, or legs? No   3. Do you have chest pain with activity? No   4. Do you have a history of  heart failure? No   5. Do you currently have a cold, bronchitis or symptoms of other infection? No   6. Do you have a cough, shortness of breath, or wheezing? No   7. Do you or anyone in your family have previous history of blood clots? No   8. Do you or does anyone in your family have a serious bleeding problem such as prolonged bleeding following surgeries or cuts? No   9. Have you ever had problems with anemia or been told to take iron pills? No   10. Have you had any abnormal blood loss such as black, tarry or bloody stools, or abnormal vaginal bleeding? No   11. Have you ever had a blood transfusion? No   12. Are you willing to have a blood transfusion if it is medically needed before, during, or after your surgery? Yes   13. Have you or any of your relatives ever had problems with anesthesia? No   14. Do you have sleep apnea, excessive snoring or daytime drowsiness? YES -    14a. Do you have a CPAP machine? No   15. Do you have any artifical heart valves or other implanted medical devices like a pacemaker, defibrillator, or continuous glucose monitor? No   16. Do you have artificial joints? No   17. Are you allergic to latex? No   18. Is there any chance that you may be pregnant? No       Health Care  Directive:  Patient does not have a Health Care Directive or Living Will: Discussed advance care planning with patient; information given to patient to review.    Preoperative Review of :   reviewed - controlled substances reflected in medication list.      Status of Chronic Conditions:  See problem list for active medical problems.  Problems all longstanding and stable, except as noted/documented.  See ROS for pertinent symptoms related to these conditions.      Review of Systems  CONSTITUTIONAL: NEGATIVE for fever, chills, change in weight  INTEGUMENTARY/SKIN: NEGATIVE for worrisome rashes, moles or lesions  EYES: NEGATIVE for vision changes or irritation  ENT/MOUTH: NEGATIVE for ear, mouth and throat problems  RESP: NEGATIVE for significant cough or SOB  CV: NEGATIVE for chest pain, palpitations or peripheral edema  GI: NEGATIVE for nausea, abdominal pain, heartburn, or change in bowel habits  : NEGATIVE for frequency, dysuria, or hematuria  MUSCULOSKELETAL:Low back pain with radicular discomfort into the buttocks and legs.  NEURO: NEGATIVE for weakness, dizziness or paresthesias  ENDOCRINE: NEGATIVE for temperature intolerance, skin/hair changes  HEME: NEGATIVE for bleeding problems  PSYCHIATRIC: NEGATIVE for changes in mood or affect    Patient Active Problem List    Diagnosis Date Noted     Moderate major depression (H) 06/03/2019     Priority: Medium     Supraventricular tachycardia (H) 06/03/2019     Priority: Medium     Psychophysiological insomnia 12/30/2017     Priority: Medium     Chronic bilateral low back pain without sciatica 12/30/2017     Priority: Medium     Opioid dependence in remission (H) 08/02/2015     Priority: Medium     On suboxone treatement with Bryant Harkins.  (Noted in 2015).         Anxiety attack 07/31/2015     Priority: Medium     Hypokalemia 06/23/2015     Priority: Medium     Tobacco abuse 06/23/2015     Priority: Medium     Hyperlipidemia LDL goal <100  01/29/2014     Priority: Medium     Anxiety 08/19/2013     Priority: Medium     GERD (gastroesophageal reflux disease) 07/28/2010     Priority: Medium     Takes omeprazole and metoclopramide       Restless legs 07/28/2010     Priority: Medium      Past Medical History:   Diagnosis Date     Abdominal pain, right lower quadrant 03/09/08    Admit. Discharged 03/10/08     Anxiety attack 7/31/2015     Atypical chest pain 6/23/2015     Dehydration      Gastric ulcer 7/31/2015     GERD (gastroesophageal reflux disease) 7/28/2010    Takes omeprazole and metoclopramide      Ingrowing nail 1/9/2014     Opioid dependence in remission (H) 8/2/2015     Other and unspecified ovarian cyst      Papanicolaou smear of cervix with low grade squamous intraepithelial lesion (LGSIL) 07/07/2017     Past Surgical History:   Procedure Laterality Date     BIOPSY CERVICAL, LOCAL EXCISION, SINGLE/MULTIPLE N/A 8/10/2017    Procedure: BIOPSY CERVICAL, LOCAL EXCISION, SINGLE/MULTIPLE;;  Surgeon: Michael Chandler MD;  Location: [facility]     COLPOSCOPY, BIOPSY, COMBINED N/A 8/10/2017    Procedure: COMBINED COLPOSCOPY, BIOPSY;  Colposcopy with Cervical Biopsies and Endometrial Biopsy, Exam with Ultrasound;  Surgeon: Michael Chandler MD;  Location: [facility]     ESOPHAGOSCOPY, GASTROSCOPY, DUODENOSCOPY (EGD), COMBINED N/A 4/17/2017    Procedure: COMBINED ESOPHAGOSCOPY, GASTROSCOPY, DUODENOSCOPY (EGD);  Surgeon: Ibrahima Esposito MD;  Location: [facility]     EXAM UNDER ANESTHESIA PELVIC N/A 8/10/2017    Procedure: EXAM UNDER ANESTHESIA PELVIC;;  Surgeon: Michael Chandler MD;  Location: [facility]     HYSTERECTOMY       HYSTERECTOMY, PAP NO LONGER INDICATED       LAPAROSCOPIC CHOLECYSTECTOMY N/A 2/2/2018    Procedure: LAPAROSCOPIC CHOLECYSTECTOMY;  Laparoscopic Cholecystectomy;  Surgeon: Tigre Lowry DO;  Location: [facility]     LAPAROSCOPIC HYSTERECTOMY TOTAL N/A 10/30/2017    Procedure: LAPAROSCOPIC HYSTERECTOMY TOTAL;   LAPAROSCOPIC HYSTERECTOMY TOTAL POSSIBLE SALPINGO-OOPHERECTOMY (BILATERAL);  Surgeon: Michael Chandler MD;  Location:  OR     [facility] [[email or web address]] UGI ENDOSCOPY, SIMPLE EXAM  01/07/08     Current Outpatient Medications   Medication Sig Dispense Refill     acetaminophen (TYLENOL) 500 MG tablet Take 1,000 mg by mouth every 6 hours as needed for mild pain       ALPRAZolam (XANAX) 0.5 MG tablet TAKE ONE TABLET BY MOUTH TWICE A DAY AS NEEDED FOR PANIC ATTACK. USE SPARINGLY 60 tablet 0     buprenorphine HCl-naloxone HCl (SUBOXONE) 2-0.5 MG per film Place 0.5 Film under the tongue daily        cetirizine (ZYRTEC) 10 MG tablet TAKE 1 TABLET (10 MG) BY MOUTH EVERY MORNING 90 tablet 1     cyclobenzaprine (FLEXERIL) 5 MG tablet TAKE ONE TO TWO TABLETS BY MOUTH THREE TIMES A DAY AS NEEDED FOR MUSCLE SPASMS 90 tablet 5     DULoxetine (CYMBALTA) 20 MG capsule Take 1 capsule (20 mg) by mouth 2 times daily 180 capsule 3     DULoxetine (CYMBALTA) 30 MG capsule Take 1 capsule (30 mg) by mouth 2 times daily 180 capsule 0     famotidine (PEPCID) 40 MG tablet Take 1 tablet (40 mg) by mouth daily (Patient taking differently: Take 40-80 mg by mouth daily as needed for heartburn ) 30 tablet 11     lactulose (CHRONULAC) 10 GM/15ML solution Take 10 g by mouth every 6 hours as needed for constipation       Melatonin 10 MG TABS tablet Take 10 mg by mouth nightly as needed for sleep       omeprazole (PRILOSEC) 40 MG DR capsule TAKE ONE CAPSULE BY MOUTH ONCE DAILY (Patient taking differently: Take 40 mg by mouth daily ) 30 capsule 11     ondansetron (ZOFRAN ODT) 4 MG ODT tab DISSOLVE ONE TABLET ON TONGUE EVERY 6 HOURS AS NEEDED FOR NAUSEA 20 tablet 1     propranolol (INDERAL) 20 MG tablet Take 1 tablet (20 mg) by mouth 2 times daily 180 tablet 3     QUEtiapine (SEROQUEL) 200 MG tablet TAKE 1 TABLET (200 MG) BY MOUTH AT BEDTIME 90 tablet 3     rOPINIRole (REQUIP) 1 MG tablet TAKE ONE TABLET BY MOUTH EVERY NIGHT AT BEDTIME 90 tablet 0     simvastatin  (ZOCOR) 10 MG tablet TAKE ONE TABLET BY MOUTH EVERY NIGHT AT BEDTIME (Patient taking differently: Take 10 mg by mouth At Bedtime ) 90 tablet 3     zolpidem (AMBIEN) 5 MG tablet TAKE 1 TABLET (5 MG) BY MOUTH NIGHTLY AS NEEDED FOR SLEEP 30 tablet 0       Allergies   Allergen Reactions     Cafergot      12-            GI problems-     Seasonal Allergies      Sumatriptan      vomits after giving herself a shot     Compazine Anxiety     Droperidol Anxiety     Nubain [Nalbuphine Hcl] Anxiety     Prochlorperazine Palpitations     Uncontrolled movement        Social History     Tobacco Use     Smoking status: Current Every Day Smoker     Packs/day: 0.50     Years: 20.00     Pack years: 10.00     Types: Cigarettes     Smokeless tobacco: Never Used   Substance Use Topics     Alcohol use: Yes     Comment: occasional drinks     Family History   Problem Relation Age of Onset     Depression Mother      Respiratory Mother      Chronic Obstructive Pulmonary Disease Mother      Cerebrovascular Disease Father         Brain anyeurism     Adrenal Disorder Other      Chronic Obstructive Pulmonary Disease Other      Breast Cancer Cousin      History   Drug Use No         Objective     LMP  (LMP Unknown)     Physical Exam    GENERAL APPEARANCE: healthy, alert and no distress     EYES: EOMI, PERRL     HENT: ear canals and TM's normal and nose and mouth without ulcers or lesions     NECK: no adenopathy, no asymmetry, masses, or scars and thyroid normal to palpation     RESP: lungs clear to auscultation - no rales, rhonchi or wheezes     CV: regular rates and rhythm, normal S1 S2, no S3 or S4 and no murmur, click or rub     ABDOMEN:  soft, nontender, no HSM or masses and bowel sounds normal     MS: extremities normal- no gross deformities noted, no evidence of inflammation in joints, FROM in all extremities.     SKIN: no suspicious lesions or rashes     NEURO: Normal strength and tone, sensory exam grossly normal, mentation intact and  speech normal     PSYCH: mentation appears normal. and affect normal/bright     LYMPHATICS: No cervical adenopathy    Recent Labs   Lab Test 07/06/21  1153 03/02/21  1403   HGB 13.6 14.1    260    140   POTASSIUM 3.9 4.3   CR 0.75 0.63        Diagnostics:  Labs pending at this time.  Results will be reviewed when available.   No EKG required for low risk surgery (cataract, skin procedure, breast biopsy, etc).    Revised Cardiac Risk Index (RCRI):  The patient has the following serious cardiovascular risks for perioperative complications:   - No serious cardiac risks = 0 points     RCRI Interpretation: 0 points: Class I (very low risk - 0.4% complication rate)           Signed Electronically by: Roberto Rios DO  Copy of this evaluation report is provided to requesting physician.      Answers for HPI/ROS submitted by the patient on 6/27/2022  If you checked off any problems, how difficult have these problems made it for you to do your work, take care of things at home, or get along with other people?: Very difficult  PHQ9 TOTAL SCORE: 12  BO 7 TOTAL SCORE: 12

## 2022-06-29 NOTE — PROGRESS NOTES
[address] [address]  Phone: [phone]  Primary Provider: Gisselle Linn  Pre-op Performing Provider: DELMI TREVIÑO      PREOPERATIVE EVALUATION:  Today's date: 6/29/2022    Radha Beckwith is a 46 year old female who presents for a preoperative evaluation.    Surgical Information:  Surgery/Procedure: Bilateral Lumbar 5-Sacral 1 Epidural Injection  Surgery Location: [facility]   Surgeon: Dianelys  Surgery Date: 7/1/2022  Time of Surgery: TBD  Where patient plans to recover: At home with family  Fax number for surgical facility: Note does not need to be faxed, will be available electronically in Epic.    Type of Anesthesia Anticipated: Epidural    Assessment & Plan     The proposed surgical procedure is considered LOW risk.    Preop general physical exam    Chronic bilateral low back pain with bilateral sciatica    Encounter for long-term (current) use of medications    Moderate episode of recurrent major depressive disorder (H)    Opioid dependence in remission (H)  - [id number] - Urine Drug Confirmation Panel (Comprehensive); Future      Still on buprenorphine.        Supraventricular tachycardia (H)    Hyperlipidemia LDL goal <100    Hypokalemia  - BASIC METABOLIC PANEL; Future    Screen for colon cancer  - GASTROENTEROLOGY ADULT REF PROCEDURE ONLY; Future    Need for hepatitis C screening test  - Hepatitis C Screen Reflex to HCV RNA Quant and Genotype; Future    Possible Sleep Apnea: Patient illustrated, patient is at an increased risk for obstructive sleep apnea.       Risks and Recommendations:  The patient has the following additional risks and recommendations for perioperative complications:   - No identified additional risk factors other than previously addressed    Medication Instructions:  Patient will take all of her medications as scheduled morning of the procedure.    RECOMMENDATION:  APPROVAL GIVEN to proceed with  proposed procedure, without further diagnostic evaluation.    Review of external notes as documented above         Subjective     HPI related to upcoming procedure: Patient has chronic low back pain with radicular discomfort into both legs.  Has tried conservative therapy such as physical therapy with no significant benefit.  Is now anticipating epidural steroid injection.    Preop Questions 6/27/2022   1. Have you ever had a heart attack or stroke? No   2. Have you ever had surgery on your heart or blood vessels, such as a stent placement, a coronary artery bypass, or surgery on an artery in your head, neck, heart, or legs? No   3. Do you have chest pain with activity? No   4. Do you have a history of  heart failure? No   5. Do you currently have a cold, bronchitis or symptoms of other infection? No   6. Do you have a cough, shortness of breath, or wheezing? No   7. Do you or anyone in your family have previous history of blood clots? No   8. Do you or does anyone in your family have a serious bleeding problem such as prolonged bleeding following surgeries or cuts? No   9. Have you ever had problems with anemia or been told to take iron pills? No   10. Have you had any abnormal blood loss such as black, tarry or bloody stools, or abnormal vaginal bleeding? No   11. Have you ever had a blood transfusion? No   12. Are you willing to have a blood transfusion if it is medically needed before, during, or after your surgery? Yes   13. Have you or any of your relatives ever had problems with anesthesia? No   14. Do you have sleep apnea, excessive snoring or daytime drowsiness? YES -    14a. Do you have a CPAP machine? No   15. Do you have any artifical heart valves or other implanted medical devices like a pacemaker, defibrillator, or continuous glucose monitor? No   16. Do you have artificial joints? No   17. Are you allergic to latex? No   18. Is there any chance that you may be pregnant? No       Health Care  Directive:  Patient does not have a Health Care Directive or Living Will: Discussed advance care planning with patient; information given to patient to review.    Preoperative Review of :   reviewed - controlled substances reflected in medication list.      Status of Chronic Conditions:  See problem list for active medical problems.  Problems all longstanding and stable, except as noted/documented.  See ROS for pertinent symptoms related to these conditions.      Review of Systems  CONSTITUTIONAL: NEGATIVE for fever, chills, change in weight  INTEGUMENTARY/SKIN: NEGATIVE for worrisome rashes, moles or lesions  EYES: NEGATIVE for vision changes or irritation  ENT/MOUTH: NEGATIVE for ear, mouth and throat problems  RESP: NEGATIVE for significant cough or SOB  CV: NEGATIVE for chest pain, palpitations or peripheral edema  GI: NEGATIVE for nausea, abdominal pain, heartburn, or change in bowel habits  : NEGATIVE for frequency, dysuria, or hematuria  MUSCULOSKELETAL:Low back pain with radicular discomfort into the buttocks and legs.  NEURO: NEGATIVE for weakness, dizziness or paresthesias  ENDOCRINE: NEGATIVE for temperature intolerance, skin/hair changes  HEME: NEGATIVE for bleeding problems  PSYCHIATRIC: NEGATIVE for changes in mood or affect    Patient Active Problem List    Diagnosis Date Noted     Moderate major depression (H) 06/03/2019     Priority: Medium     Supraventricular tachycardia (H) 06/03/2019     Priority: Medium     Psychophysiological insomnia 12/30/2017     Priority: Medium     Chronic bilateral low back pain without sciatica 12/30/2017     Priority: Medium     Opioid dependence in remission (H) 08/02/2015     Priority: Medium     On suboxone treatement with Bryant Harkins.  (Noted in 2015).         Anxiety attack 07/31/2015     Priority: Medium     Hypokalemia 06/23/2015     Priority: Medium     Tobacco abuse 06/23/2015     Priority: Medium     Hyperlipidemia LDL goal <100  01/29/2014     Priority: Medium     Anxiety 08/19/2013     Priority: Medium     GERD (gastroesophageal reflux disease) 07/28/2010     Priority: Medium     Takes omeprazole and metoclopramide       Restless legs 07/28/2010     Priority: Medium      Past Medical History:   Diagnosis Date     Abdominal pain, right lower quadrant 03/09/08    Admit. Discharged 03/10/08     Anxiety attack 7/31/2015     Atypical chest pain 6/23/2015     Dehydration      Gastric ulcer 7/31/2015     GERD (gastroesophageal reflux disease) 7/28/2010    Takes omeprazole and metoclopramide      Ingrowing nail 1/9/2014     Opioid dependence in remission (H) 8/2/2015     Other and unspecified ovarian cyst      Papanicolaou smear of cervix with low grade squamous intraepithelial lesion (LGSIL) 07/07/2017     Past Surgical History:   Procedure Laterality Date     BIOPSY CERVICAL, LOCAL EXCISION, SINGLE/MULTIPLE N/A 8/10/2017    Procedure: BIOPSY CERVICAL, LOCAL EXCISION, SINGLE/MULTIPLE;;  Surgeon: Michael Chandler MD;  [facility]     COLPOSCOPY, BIOPSY, COMBINED N/A 8/10/2017    Procedure: COMBINED COLPOSCOPY, BIOPSY;  Colposcopy with Cervical Biopsies and Endometrial Biopsy, Exam with Ultrasound;  Surgeon: Michael Chandler MD;  [facility]     ESOPHAGOSCOPY, GASTROSCOPY, DUODENOSCOPY (EGD), COMBINED N/A 4/17/2017    Procedure: COMBINED ESOPHAGOSCOPY, GASTROSCOPY, DUODENOSCOPY (EGD);  Surgeon: Ibrahima Esposito MD;  [facility]     EXAM UNDER ANESTHESIA PELVIC N/A 8/10/2017    Procedure: EXAM UNDER ANESTHESIA PELVIC;;  Surgeon: Michael Chandler MD;  [facility]     HYSTERECTOMY       HYSTERECTOMY, PAP NO LONGER INDICATED       LAPAROSCOPIC CHOLECYSTECTOMY N/A 2/2/2018    Procedure: LAPAROSCOPIC CHOLECYSTECTOMY;  Laparoscopic Cholecystectomy;  Surgeon: Tigre Lowry DO;  [facility]     LAPAROSCOPIC HYSTERECTOMY TOTAL N/A 10/30/2017    Procedure: LAPAROSCOPIC HYSTERECTOMY TOTAL;   LAPAROSCOPIC HYSTERECTOMY TOTAL POSSIBLE SALPINGO-OOPHERECTOMY (BILATERAL);  Surgeon: Michael Chandler MD;  Location:  OR     [facility] UGI ENDOSCOPY, SIMPLE EXAM  01/07/08     Current Outpatient Medications   Medication Sig Dispense Refill     acetaminophen (TYLENOL) 500 MG tablet Take 1,000 mg by mouth every 6 hours as needed for mild pain       ALPRAZolam (XANAX) 0.5 MG tablet TAKE ONE TABLET BY MOUTH TWICE A DAY AS NEEDED FOR PANIC ATTACK. USE SPARINGLY 60 tablet 0     buprenorphine HCl-naloxone HCl (SUBOXONE) 2-0.5 MG per film Place 0.5 Film under the tongue daily        cetirizine (ZYRTEC) 10 MG tablet TAKE 1 TABLET (10 MG) BY MOUTH EVERY MORNING 90 tablet 1     cyclobenzaprine (FLEXERIL) 5 MG tablet TAKE ONE TO TWO TABLETS BY MOUTH THREE TIMES A DAY AS NEEDED FOR MUSCLE SPASMS 90 tablet 5     DULoxetine (CYMBALTA) 20 MG capsule Take 1 capsule (20 mg) by mouth 2 times daily 180 capsule 3     DULoxetine (CYMBALTA) 30 MG capsule Take 1 capsule (30 mg) by mouth 2 times daily 180 capsule 0     famotidine (PEPCID) 40 MG tablet Take 1 tablet (40 mg) by mouth daily (Patient taking differently: Take 40-80 mg by mouth daily as needed for heartburn ) 30 tablet 11     lactulose (CHRONULAC) 10 GM/15ML solution Take 10 g by mouth every 6 hours as needed for constipation       Melatonin 10 MG TABS tablet Take 10 mg by mouth nightly as needed for sleep       omeprazole (PRILOSEC) 40 MG DR capsule TAKE ONE CAPSULE BY MOUTH ONCE DAILY (Patient taking differently: Take 40 mg by mouth daily ) 30 capsule 11     ondansetron (ZOFRAN ODT) 4 MG ODT tab DISSOLVE ONE TABLET ON TONGUE EVERY 6 HOURS AS NEEDED FOR NAUSEA 20 tablet 1     propranolol (INDERAL) 20 MG tablet Take 1 tablet (20 mg) by mouth 2 times daily 180 tablet 3     QUEtiapine (SEROQUEL) 200 MG tablet TAKE 1 TABLET (200 MG) BY MOUTH AT BEDTIME 90 tablet 3     rOPINIRole (REQUIP) 1 MG tablet TAKE ONE TABLET BY MOUTH EVERY NIGHT AT BEDTIME 90 tablet 0     simvastatin  (ZOCOR) 10 MG tablet TAKE ONE TABLET BY MOUTH EVERY NIGHT AT BEDTIME (Patient taking differently: Take 10 mg by mouth At Bedtime ) 90 tablet 3     zolpidem (AMBIEN) 5 MG tablet TAKE 1 TABLET (5 MG) BY MOUTH NIGHTLY AS NEEDED FOR SLEEP 30 tablet 0       Allergies   Allergen Reactions     Cafergot      12-            GI problems-     Seasonal Allergies      Sumatriptan      vomits after giving herself a shot     Compazine Anxiety     Droperidol Anxiety     Nubain [Nalbuphine Hcl] Anxiety     Prochlorperazine Palpitations     Uncontrolled movement        Social History     Tobacco Use     Smoking status: Current Every Day Smoker     Packs/day: 0.50     Years: 20.00     Pack years: 10.00     Types: Cigarettes     Smokeless tobacco: Never Used   Substance Use Topics     Alcohol use: Yes     Comment: occasional drinks     Family History   Problem Relation Age of Onset     Depression Mother      Respiratory Mother      Chronic Obstructive Pulmonary Disease Mother      Cerebrovascular Disease Father         Brain anyeurism     Adrenal Disorder Other      Chronic Obstructive Pulmonary Disease Other      Breast Cancer Cousin      History   Drug Use No         Objective     LMP  (LMP Unknown)     Physical Exam    GENERAL APPEARANCE: healthy, alert and no distress     EYES: EOMI, PERRL     HENT: ear canals and TM's normal and nose and mouth without ulcers or lesions     NECK: no adenopathy, no asymmetry, masses, or scars and thyroid normal to palpation     RESP: lungs clear to auscultation - no rales, rhonchi or wheezes     CV: regular rates and rhythm, normal S1 S2, no S3 or S4 and no murmur, click or rub     ABDOMEN:  soft, nontender, no HSM or masses and bowel sounds normal     MS: extremities normal- no gross deformities noted, no evidence of inflammation in joints, FROM in all extremities.     SKIN: no suspicious lesions or rashes     NEURO: Normal strength and tone, sensory exam grossly normal, mentation intact and  speech normal     PSYCH: mentation appears normal. and affect normal/bright     LYMPHATICS: No cervical adenopathy    Recent Labs   Lab Test 07/06/21  1153 03/02/21  1403   HGB 13.6 14.1    260    140   POTASSIUM 3.9 4.3   CR 0.75 0.63        Diagnostics:  Labs pending at this time.  Results will be reviewed when available.   No EKG required for low risk surgery (cataract, skin procedure, breast biopsy, etc).    Revised Cardiac Risk Index (RCRI):  The patient has the following serious cardiovascular risks for perioperative complications:   - No serious cardiac risks = 0 points     RCRI Interpretation: 0 points: Class I (very low risk - 0.4% complication rate)           Signed Electronically by: Roberto Rios DO  Copy of this evaluation report is provided to requesting physician.      Answers for HPI/ROS submitted by the patient on 6/27/2022  If you checked off any problems, how difficult have these problems made it for you to do your work, take care of things at home, or get along with other people?: Very difficult  PHQ9 TOTAL SCORE: 12  BO 7 TOTAL SCORE: 12

## 2022-06-30 ENCOUNTER — ANESTHESIA EVENT (OUTPATIENT)
Dept: SURGERY | Facility: CLINIC | Age: 47
End: 2022-06-30
Payer: COMMERCIAL

## 2022-06-30 ASSESSMENT — ENCOUNTER SYMPTOMS: DYSRHYTHMIAS: 1

## 2022-06-30 ASSESSMENT — LIFESTYLE VARIABLES: TOBACCO_USE: 1

## 2022-06-30 NOTE — ANESTHESIA PREPROCEDURE EVALUATION
Anesthesia Pre-Procedure Evaluation    Patient: Radha Beckwith   MRN: [id number] : 1975        Procedure : Procedure(s):  Bilateral Lumbar 5-Sacral 1 Epidural Injection          Past Medical History:   Diagnosis Date     Abdominal pain, right lower quadrant 2008    Admit. Discharged 03/10/08     Anxiety attack 2015     Atypical chest pain 2015     Dehydration      Gastric ulcer 2015     GERD (gastroesophageal reflux disease) 2010    Takes omeprazole and metoclopramide      Ingrowing nail 2014     Migraines      Opioid dependence in remission (H) 2015     Other and unspecified ovarian cyst      Papanicolaou smear of cervix with low grade squamous intraepithelial lesion (LGSIL) 2017      Past Surgical History:   Procedure Laterality Date     BIOPSY CERVICAL, LOCAL EXCISION, SINGLE/MULTIPLE N/A 8/10/2017    Procedure: BIOPSY CERVICAL, LOCAL EXCISION, SINGLE/MULTIPLE;;  Surgeon: Michael Chandler MD;  [facility]     COLPOSCOPY, BIOPSY, COMBINED N/A 8/10/2017    Procedure: COMBINED COLPOSCOPY, BIOPSY;  Colposcopy with Cervical Biopsies and Endometrial Biopsy, Exam with Ultrasound;  Surgeon: Michael Chandler MD;  [facility]     ESOPHAGOSCOPY, GASTROSCOPY, DUODENOSCOPY (EGD), COMBINED N/A 2017    Procedure: COMBINED ESOPHAGOSCOPY, GASTROSCOPY, DUODENOSCOPY (EGD);  Surgeon: Ibrahima Esposito MD;  [facility]     EXAM UNDER ANESTHESIA PELVIC N/A 8/10/2017    Procedure: EXAM UNDER ANESTHESIA PELVIC;;  Surgeon: Michael Chandler MD;  [facility]     HYSTERECTOMY       HYSTERECTOMY, PAP NO LONGER INDICATED       LAPAROSCOPIC CHOLECYSTECTOMY N/A 2018    Procedure: LAPAROSCOPIC CHOLECYSTECTOMY;  Laparoscopic Cholecystectomy;  Surgeon: Tigre Lowry DO;  [facility]     LAPAROSCOPIC HYSTERECTOMY TOTAL N/A 10/30/2017    Procedure: LAPAROSCOPIC HYSTERECTOMY TOTAL;  LAPAROSCOPIC HYSTERECTOMY TOTAL  POSSIBLE SALPINGO-OOPHERECTOMY (BILATERAL);  Surgeon: Michael Chandler MD;  Location:  OR     [facility] UGI ENDOSCOPY, SIMPLE EXAM  01/07/08      Allergies   Allergen Reactions     [facility]      12-            GI problems-     Seasonal Allergies      Sumatriptan      vomits after giving herself a shot     Compazine Anxiety     Droperidol Anxiety     Nubain [Nalbuphine Hcl] Anxiety     Prochlorperazine Palpitations     Uncontrolled movement      Social History     Tobacco Use     Smoking status: Current Every Day Smoker     Packs/day: 0.25     Years: 20.00     Pack years: 5.00     Types: Cigarettes     Smokeless tobacco: Never Used   Substance Use Topics     Alcohol use: Yes     Comment: occasional drinks; about 5-6 beers/week      Wt Readings from Last 1 Encounters:   06/29/22 78.5 kg (173 lb)        Anesthesia Evaluation   Pt has had prior anesthetic. Type: General and MAC.    No history of anesthetic complications       ROS/MED HX  ENT/Pulmonary:     (+) tobacco use, Current use,     Neurologic:  - neg neurologic ROS     Cardiovascular:     (+) Dyslipidemia -----dysrhythmias, Previous cardiac testing   Echo: Date: Results:    Stress Test: Date: Results:    ECG Reviewed: Date: 11/24/19 Results:  SR  Cath: Date: Results:      METS/Exercise Tolerance:     Hematologic:  - neg hematologic  ROS     Musculoskeletal:       GI/Hepatic:     (+) GERD, Asymptomatic on medication,     Renal/Genitourinary:  - neg Renal ROS     Endo:  - neg endo ROS     Psychiatric/Substance Use:     (+) psychiatric history depression and anxiety H/O chronic opiod use . : opiod dependence un remnissions     Infectious Disease:  - neg infectious disease ROS     Malignancy:  - neg malignancy ROS     Other:  - neg other ROS    (+) , H/O Chronic Pain,        Physical Exam    Airway  airway exam normal      Mallampati: II   TM distance: > 3 FB   Neck ROM: full   Mouth opening: > 3 cm    Respiratory Devices and Support         Dental  no  notable dental history         Cardiovascular   cardiovascular exam normal       Rhythm and rate: regular and normal     Pulmonary   pulmonary exam normal        breath sounds clear to auscultation           OUTSIDE LABS:  CBC:   Lab Results   Component Value Date    WBC 10.2 07/06/2021    WBC 7.7 03/02/2021    HGB 13.6 07/06/2021    HGB 14.1 03/02/2021    HCT 40.7 07/06/2021    HCT 42.8 03/02/2021     07/06/2021     03/02/2021     BMP:   Lab Results   Component Value Date     06/29/2022     07/06/2021    POTASSIUM 3.4 06/29/2022    POTASSIUM 3.9 07/06/2021    CHLORIDE 106 06/29/2022    CHLORIDE 104 07/06/2021    CO2 30 06/29/2022    CO2 31 07/06/2021    BUN 9 06/29/2022    BUN 10 07/06/2021    CR 0.68 06/29/2022    CR 0.75 07/06/2021     (H) 06/29/2022     (H) 07/06/2021     COAGS:   Lab Results   Component Value Date    PTT 24 03/24/2017    INR 0.87 03/24/2017     POC:   Lab Results   Component Value Date    HCG Negative 05/22/2013     HEPATIC:   Lab Results   Component Value Date    ALBUMIN 3.8 07/06/2021    PROTTOTAL 7.6 07/06/2021    ALT 34 07/06/2021    AST 33 07/06/2021    ALKPHOS 95 07/06/2021    BILITOTAL 0.5 07/06/2021     OTHER:   Lab Results   Component Value Date    A1C 5.0 11/24/2019    MOISES 8.2 (L) 06/29/2022    PHOS 2.7 08/17/2007    MAG 1.6 08/17/2007    LIPASE 59 (L) 07/06/2021    AMYLASE 37 01/24/2018    TSH 3.51 06/29/2022    CRP 6.3 03/02/2021    SED 12 03/02/2021       Anesthesia Plan    ASA Status:  2   NPO Status:  NPO Appropriate    Anesthesia Type: MAC.      Maintenance: TIVA.        Consents    Anesthesia Plan(s) and associated risks, benefits, and realistic alternatives discussed. Questions answered and patient/representative(s) expressed understanding.    - Discussed:     - Discussed with:  Patient    Use of blood products discussed: No .     Postoperative Care            Comments:    Other Comments: The risks and benefits of anesthesia, and the  alternatives where applicable, have been discussed with the patient, and they wish to proceed.            Yohan Billings, APRN CRNA

## 2022-07-01 ENCOUNTER — HOSPITAL ENCOUNTER (OUTPATIENT)
Dept: GENERAL RADIOLOGY | Facility: CLINIC | Age: 47
Discharge: HOME | End: 2022-07-01
Attending: ANESTHESIOLOGY | Admitting: ANESTHESIOLOGY
Payer: COMMERCIAL

## 2022-07-01 ENCOUNTER — ANESTHESIA (OUTPATIENT)
Dept: SURGERY | Facility: CLINIC | Age: 47
End: 2022-07-01
Payer: COMMERCIAL

## 2022-07-01 ENCOUNTER — HOSPITAL ENCOUNTER (OUTPATIENT)
Facility: CLINIC | Age: 47
Discharge: HOME | End: 2022-07-01
Attending: ANESTHESIOLOGY | Admitting: ANESTHESIOLOGY
Payer: COMMERCIAL

## 2022-07-01 VITALS
HEART RATE: 79 BPM | DIASTOLIC BLOOD PRESSURE: 71 MMHG | TEMPERATURE: 98.4 F | HEIGHT: 64 IN | BODY MASS INDEX: 29.53 KG/M2 | WEIGHT: 173 LBS | SYSTOLIC BLOOD PRESSURE: 107 MMHG | RESPIRATION RATE: 20 BRPM

## 2022-07-01 DIAGNOSIS — M54.16 LUMBAR RADICULOPATHY: ICD-10-CM

## 2022-07-01 PROCEDURE — 370N000017 HC ANESTHESIA TECHNICAL FEE, PER MIN: Performed by: ANESTHESIOLOGY

## 2022-07-01 PROCEDURE — 250N000011 HC RX IP 250 OP 636: Performed by: NURSE ANESTHETIST, CERTIFIED REGISTERED

## 2022-07-01 PROCEDURE — 250N000011 HC RX IP 250 OP 636: Performed by: ANESTHESIOLOGY

## 2022-07-01 PROCEDURE — 64483 NJX AA&/STRD TFRM EPI L/S 1: CPT | Mod: 50 | Performed by: ANESTHESIOLOGY

## 2022-07-01 PROCEDURE — 250N000009 HC RX 250: Performed by: NURSE ANESTHETIST, CERTIFIED REGISTERED

## 2022-07-01 PROCEDURE — 999N000179 XR SURGERY CARM FLUORO LESS THAN 5 MIN W STILLS: Mod: TC

## 2022-07-01 RX ORDER — METHYLPREDNISOLONE ACETATE 40 MG/ML
INJECTION, SUSPENSION INTRA-ARTICULAR; INTRALESIONAL; INTRAMUSCULAR; SOFT TISSUE PRN
Status: DISCONTINUED | OUTPATIENT
Start: 2022-07-01 | End: 2022-07-01 | Stop reason: HOSPADM

## 2022-07-01 RX ORDER — SODIUM CHLORIDE, SODIUM LACTATE, POTASSIUM CHLORIDE, CALCIUM CHLORIDE 600; 310; 30; 20 MG/100ML; MG/100ML; MG/100ML; MG/100ML
INJECTION, SOLUTION INTRAVENOUS CONTINUOUS
Status: DISCONTINUED | OUTPATIENT
Start: 2022-07-01 | End: 2022-07-01 | Stop reason: HOSPADM

## 2022-07-01 RX ORDER — IOPAMIDOL 612 MG/ML
INJECTION, SOLUTION INTRATHECAL PRN
Status: DISCONTINUED | OUTPATIENT
Start: 2022-07-01 | End: 2022-07-01 | Stop reason: HOSPADM

## 2022-07-01 RX ORDER — LIDOCAINE HYDROCHLORIDE 20 MG/ML
INJECTION, SOLUTION INFILTRATION; PERINEURAL PRN
Status: DISCONTINUED | OUTPATIENT
Start: 2022-07-01 | End: 2022-07-01

## 2022-07-01 RX ORDER — PROPOFOL 10 MG/ML
INJECTION, EMULSION INTRAVENOUS PRN
Status: DISCONTINUED | OUTPATIENT
Start: 2022-07-01 | End: 2022-07-01

## 2022-07-01 RX ORDER — LIDOCAINE 40 MG/G
CREAM TOPICAL
Status: DISCONTINUED | OUTPATIENT
Start: 2022-07-01 | End: 2022-07-01 | Stop reason: HOSPADM

## 2022-07-01 RX ADMIN — LIDOCAINE HYDROCHLORIDE 40 MG: 20 INJECTION, SOLUTION INFILTRATION; PERINEURAL at 12:30

## 2022-07-01 RX ADMIN — PROPOFOL 50 MG: 10 INJECTION, EMULSION INTRAVENOUS at 12:33

## 2022-07-01 RX ADMIN — MIDAZOLAM 2 MG: 1 INJECTION INTRAMUSCULAR; INTRAVENOUS at 12:25

## 2022-07-01 RX ADMIN — PROPOFOL 50 MG: 10 INJECTION, EMULSION INTRAVENOUS at 12:32

## 2022-07-01 RX ADMIN — PROPOFOL 50 MG: 10 INJECTION, EMULSION INTRAVENOUS at 12:31

## 2022-07-01 RX ADMIN — LIDOCAINE HYDROCHLORIDE 0.1 ML: 10 INJECTION, SOLUTION EPIDURAL; INFILTRATION; INTRACAUDAL; PERINEURAL at 12:18

## 2022-07-01 NOTE — DISCHARGE INSTRUCTIONS

## 2022-07-01 NOTE — ANESTHESIA CARE TRANSFER NOTE
Patient: Radha Beckwith    Procedure: Procedure(s):  Bilateral Lumbar 5-Sacral 1 transforaminal Epidural Injections       Diagnosis: Lumbar radiculopathy [M54.16]  Diagnosis Additional Information: No value filed.    Anesthesia Type:   MAC     Note:    Oropharynx: oropharynx clear of all foreign objects and spontaneously breathing  Level of Consciousness: drowsy  Oxygen Supplementation: room air    Independent Airway: airway patency satisfactory and stable  Dentition: dentition unchanged  Vital Signs Stable: post-procedure vital signs reviewed and stable  Report to RN Given: handoff report given  Patient transferred to: Phase II    Handoff Report: Identifed the Patient, Identified the Reponsible Provider, Reviewed the pertinent medical history, Discussed the surgical course, Reviewed Intra-OP anesthesia mangement and issues during anesthesia, Set expectations for post-procedure period and Allowed opportunity for questions and acknowledgement of understanding      Vitals:  Vitals Value Taken Time   BP     Temp     Pulse     Resp     SpO2 92 % 07/01/22 1242   Vitals shown include unvalidated device data.    Electronically Signed By: JEANNIE Anand CRNA  July 1, 2022  12:43 PM

## 2022-07-01 NOTE — OP NOTE
PRIMARY PROBLEM: Low back pain and bilateral leg pains    PROCEDURE: L5-S1  Transforaminal Epidural Steroid Injection with fluoroscopic guidance and contrast.     PROCEDURE DETAILS: After written informed consent was obtained from the patient, the patient was escorted to the procedure room.  The patient was placed in the prone position.  A  time out  was conducted to verify patient identity, procedure to be performed, side, site, allergies and any special requirements.  The skin over the thoracolumbar region was prepped and draped in normal sterile fashion with ChloraPrep. Fluoroscopy was used to identify the neural foramen in AP view and the skin was anesthetized with 2 mL of 1% lidocaine with bicarbonate buffer. Tqwo 22-gauge Quincke spinal needles were advanced through this location and advanced under fluoroscopic guidance towards her L5-S1 neural foramens.  The target zone was the 6 o clock position of her L5 pedicle.   Prior to entering the foramen, the depth of the needle was gauged with a lateral view on fluoroscopy. While still in a lateral view, the needle was slowly advanced to avoid injury to the spinal nerve.  Then, in the oblique view (approximately 28 degrees), after negative aspiration, 1.5 mL of Omnipaque contrast dye was injected revealing epidural spread without evidence of intravascular or intrathecal spread.  Then a 3cc solution of 20 mg of Depo-Medrol in 2.5 mL of  Preservative-Free saline was slowly injected into the epidural space at each segment.  This was done bilaterally at her L5-S1 levels with good spread of injected medication covering her central epidural space as well as both foraminal and lateral recess locations at her L5-S1 segment.  After injection of the medication, as the needle tip was withdrawn, it was flushed with local anesthetic.   The patient was monitored with blood pressure and pulse oximetry machines with the assistance of an RN throughout the procedure.  The patient was  alert and responsive to questions throughout the procedure.   The patient tolerated the procedure well and was observed in the post-procedural area.  The patient was dismissed without apparent complications.     BLOOD LOSS: < 5 cc    DIAGNOSIS:  1.  Lumbar disc herniation at L5-S1 with a high intensity zone and bilateral foraminal stenosis causing back pain and leg pains    PLAN:  1. Performed bilateral L5-S1  transforaminal epidural steroid injections.  2. The patient was instructed to follow-up per Dr. Ivory's instructions.  If is not beneficial I recommend going to [facility] for another consultation for her next plan of care.  I told her to wait for at least 7 to 10 days after the injection to see if this is beneficial to her.    Trevor Ivory MD  Diplomate of the American Board of Anesthesiology, Pain Medicine

## 2022-07-01 NOTE — ANESTHESIA POSTPROCEDURE EVALUATION
Patient: Radha Beckwith    Procedure: Procedure(s):  Bilateral Lumbar 5-Sacral 1 transforaminal Epidural Injections       Anesthesia Type:  MAC    Note:  Disposition: Outpatient   Postop Pain Control: Uneventful            Sign Out: Well controlled pain   PONV: No   Neuro/Psych: Uneventful            Sign Out: Acceptable/Baseline neuro status   Airway/Respiratory: Uneventful            Sign Out: Acceptable/Baseline resp. status   CV/Hemodynamics: Uneventful            Sign Out: Acceptable CV status   Other NRE: NONE   DID A NON-ROUTINE EVENT OCCUR? No    Event details/Postop Comments:  Pt was happy with anesthesia care.  No complications.  I will follow up with the pt if needed.           Last vitals:  Vitals Value Taken Time   /71 07/01/22 1300   Temp     Pulse 79 07/01/22 1300   Resp 20 07/01/22 1240   SpO2 94 % 07/01/22 1308   Vitals shown include unvalidated device data.    Electronically Signed By: JEANNIE Anand CRNA  July 1, 2022  1:17 PM

## 2022-07-01 NOTE — INTERVAL H&P NOTE
"I have reviewed the surgical (or preoperative) H&P that is linked to this encounter, and examined the patient. There are no significant changes    Clinical Conditions Present on Arrival:  Clinically Significant Risk Factors Present on Admission                   # Overweight: Estimated body mass index is 29.7 kg/m  as calculated from the following:    Height as of 6/2/22: 1.626 m (5' 4\").    Weight as of 6/29/22: 78.5 kg (173 lb).       "

## 2022-07-12 ENCOUNTER — OFFICE VISIT (OUTPATIENT)
Dept: ORTHOPEDICS | Facility: CLINIC | Age: 47
End: 2022-07-12
Payer: COMMERCIAL

## 2022-07-12 VITALS — SYSTOLIC BLOOD PRESSURE: 138 MMHG | HEART RATE: 117 BPM | OXYGEN SATURATION: 96 % | DIASTOLIC BLOOD PRESSURE: 81 MMHG

## 2022-07-12 DIAGNOSIS — G56.01 RIGHT CARPAL TUNNEL SYNDROME: Primary | ICD-10-CM

## 2022-07-12 PROCEDURE — 99213 OFFICE O/P EST LOW 20 MIN: CPT | Performed by: ORTHOPAEDIC SURGERY

## 2022-07-12 ASSESSMENT — PAIN SCALES - GENERAL: PAINLEVEL: MODERATE PAIN (4)

## 2022-07-12 NOTE — LETTER
7/12/2022         RE: Radha Beckwith  [id number] [facility] [address]        Dear Colleague,    Thank you for referring your patient, Radha Beckwith, to the [facility]. Please see a copy of my visit note below.    HISTORY OF PRESENT ILLNESS:    Radha Beckwith is a 46 year old female who is seen in follow up for her suspected bilateral Dequervains, and possible bilateral carpal tunnel syndrome .  Present symptoms: pulling weeds painful  Pain radial wrist  Dropping things, weakness  Pain radial wrist and volar.  Clicking with range of motion  numbness/tingling bilateral thumbs.  Writing painful.   Difficulty preparing meals.   Burning pain.  Right worse than left.     Treatments tried to this point: last visit 3/9/21:  Thumb spica splints given.  Wear at night, and on/off during day  voltaren gel suggested  Hand therapy.  Work issues: avoid strong grasping, heavy and repetitive lifting x 6 weeks. Should be allowed to wear wrist braces.   Return to clinic: as needed, consider corticosteroid injection if no improvement   Wears braces with gardening and at night. Helps a bit.      Review of Systems:  Constitutional/General: Negative for fever, chills, change in weight  Integumentary/Skin: Negative for worrisome rashes, moles, or lesions  Neuro: Negative for weakness, dizziness, or paresthesias   Psychiatric: negative for changes in mood or affect    OBJECTIVE:  Physical Exam:  /81 (BP Location: Left arm, Patient Position: Sitting, Cuff Size: Adult Regular)   Pulse 117   LMP  (LMP Unknown)   SpO2 96%   General Appearance: healthy, alert and no distress   Skin: no suspicious lesions or rashes  Neuro: Normal strength and tone, mentation intact and speech normal  Vascular: good pulses, and capillary refill   Lymph: no lymphadenopathy   Psych:  mentation appears normal and affect normal/bright  Resp: no increased work of breathing     MSK:  finklesteins equivocal bilateral    Mild 1st dorsal compartment tenderness  +tinels at carpal tunnels  + phalens bilaterally   strong   No fasciculations thenar      Impression: I think the pain is mainly due to carpal tunnel syndrome. Not showing much in the way of signs of dequervains.    Plan: I suggest either a carpal tunnel corticosteroid injection for both diagnostic and therapeutic purposes, or EMG.  She opted for the image guided corticosteroid injection, right carpal tunnel    She wanted new braces today  Consider 1st dorsal compartment injections in the future.     Return to clinic as needed     TJ Patterson MD  Dept. Orthopedic Surgery  [facility]             Again, thank you for allowing me to participate in the care of your patient.        Sincerely,        Dwaine Patterson MD

## 2022-07-12 NOTE — PROGRESS NOTES
HISTORY OF PRESENT ILLNESS:    Radha Beckwith is a 46 year old female who is seen in follow up for her suspected bilateral Dequervains, and possible bilateral carpal tunnel syndrome .  Present symptoms: pulling weeds painful  Pain radial wrist  Dropping things, weakness  Pain radial wrist and volar.  Clicking with range of motion  numbness/tingling bilateral thumbs.  Writing painful.   Difficulty preparing meals.   Burning pain.  Right worse than left.     Treatments tried to this point: last visit 3/9/21:  Thumb spica splints given.  Wear at night, and on/off during day  voltaren gel suggested  Hand therapy.  Work issues: avoid strong grasping, heavy and repetitive lifting x 6 weeks. Should be allowed to wear wrist braces.   Return to clinic: as needed, consider corticosteroid injection if no improvement   Wears braces with gardening and at night. Helps a bit.      Review of Systems:  Constitutional/General: Negative for fever, chills, change in weight  Integumentary/Skin: Negative for worrisome rashes, moles, or lesions  Neuro: Negative for weakness, dizziness, or paresthesias   Psychiatric: negative for changes in mood or affect    OBJECTIVE:  Physical Exam:  /81 (BP Location: Left arm, Patient Position: Sitting, Cuff Size: Adult Regular)   Pulse 117   LMP  (LMP Unknown)   SpO2 96%   General Appearance: healthy, alert and no distress   Skin: no suspicious lesions or rashes  Neuro: Normal strength and tone, mentation intact and speech normal  Vascular: good pulses, and capillary refill   Lymph: no lymphadenopathy   Psych:  mentation appears normal and affect normal/bright  Resp: no increased work of breathing     MSK:  finklesteins equivocal bilateral   Mild 1st dorsal compartment tenderness  +tinels at carpal tunnels  + phalens bilaterally   strong   No fasciculations thenar      Impression: I think the pain is mainly due to carpal tunnel syndrome. Not showing much in the way of signs of  dequervains.    Plan: I suggest either a carpal tunnel corticosteroid injection for both diagnostic and therapeutic purposes, or EMG.  She opted for the image guided corticosteroid injection, right carpal tunnel    She wanted new braces today  Consider 1st dorsal compartment injections in the future.     Return to clinic as needed     TJ Patterson MD  Dept. Orthopedic Surgery  [facility]

## 2022-07-13 DIAGNOSIS — M54.2 CHRONIC NECK PAIN: ICD-10-CM

## 2022-07-13 DIAGNOSIS — G89.29 CHRONIC NECK PAIN: ICD-10-CM

## 2022-07-14 RX ORDER — CYCLOBENZAPRINE HCL 5 MG
TABLET ORAL
Qty: 90 TABLET | Refills: 5 | Status: SHIPPED | OUTPATIENT
Start: 2022-07-14 | End: 2022-10-31

## 2022-07-14 NOTE — TELEPHONE ENCOUNTER
Flexeril      Last Written Prescription Date:  4/7/2022  Last Fill Quantity: 90,   # refills: 5  Last Office Visit: 6/29/2022  Future Office visit:       Routing refill request to provider for review/approval because:  Drug not on the [facility], [facility] or  Health refill protocol or controlled substance    Carlos Hernandez RN

## 2022-07-14 NOTE — DISCHARGE SUMMARY
[facility]    Outpatient Physical Therapy Discharge Note  Patient: Radha Beckwith  : 1975    Beginning/End Dates of Reporting Period:  17 total sessions    Referring Provider: Wood Gunter MD    Therapy Diagnosis: Neck and back pain with positive SIJ tests     Client Self Report: see 2022 note    Objective Measurements:  see 2022 note    Goals:  Goal Identifier Walking   Goal Description Linette will be able to walk on level and stairs with railings as needed x 15 minutes with minimal increase in symptoms for getting round the community and her home   Target Date 22   Date Met      Progress (detail required for progress note): more difficult since she fell     Goal Identifier Driving   Goal Description Linette will be able to turn her head to look over shoulders when driving for safety as noted with increase of cervical rotation to 40 degrees (has not been driving x 2 w, last reported some ease w/rot)   Target Date 22   Date Met      Progress (detail required for progress note): Some days is better and other days not as good. Better backing car up.     Goal Identifier Home program   Goal Description Linette will be able to complete her home program as instructed to improve her strength, stabilization and ROM to improve her function as noted with a 20 point improvement on NDI and ALBERT with MICHELLE decreasing to medium   Target Date 22   Date Met      Progress (detail required for progress note): Working on home program, thinks they are helpful.     Goal Identifier Back pain   Goal Description Linette would like to get rid of her back pain so she can do everything   Target Date 22   Date Met      Progress (detail required for progress note): Personal goal and she has started core and posture work to progess toward this   Plan:  Discharge from  therapy.    Discharge:    Reason for Discharge: did not feel she was progressing    Equipment Issued: band    Discharge Plan: Follow up with provider, continue home program as able

## 2022-07-18 ENCOUNTER — HOSPITAL ENCOUNTER (OUTPATIENT)
Dept: MRI IMAGING | Facility: CLINIC | Age: 47
Discharge: HOME | End: 2022-07-18
Attending: INTERNAL MEDICINE | Admitting: INTERNAL MEDICINE
Payer: COMMERCIAL

## 2022-07-18 DIAGNOSIS — F51.04 PSYCHOPHYSIOLOGICAL INSOMNIA: ICD-10-CM

## 2022-07-18 DIAGNOSIS — R41.3 MEMORY LOSS: ICD-10-CM

## 2022-07-18 PROCEDURE — 70551 MRI BRAIN STEM W/O DYE: CPT

## 2022-07-18 RX ORDER — ZOLPIDEM TARTRATE 5 MG/1
5 TABLET ORAL
Qty: 30 TABLET | Refills: 0 | Status: SHIPPED | OUTPATIENT
Start: 2022-07-18 | End: 2022-08-18

## 2022-07-18 NOTE — TELEPHONE ENCOUNTER
Requested Prescriptions   Pending Prescriptions Disp Refills     zolpidem (AMBIEN) 5 MG tablet 30 tablet 0     Sig: Take 1 tablet (5 mg) by mouth nightly as needed for sleep       There is no refill protocol information for this order        Last Written Prescription Date:  06/13/2022  Last Fill Quantity: 30,   # refills: 0  Last Office Visit: 12/02/2021  Future Office visit:       Routing refill request to provider for review/approval because:  Drug not on the [facility] or [facility] refill protocol or controlled substance

## 2022-07-25 DIAGNOSIS — F41.0 PANIC ATTACKS: ICD-10-CM

## 2022-07-25 NOTE — TELEPHONE ENCOUNTER
Xanax      Last Written Prescription Date:  6/24/2022  Last Fill Quantity: 60,   # refills: 0  Last Office Visit: 6/29/2022  Future Office visit:       Routing refill request to provider for review/approval because:  Drug not on the [facility], [facility] or [facility] refill protocol or controlled substance    Carlos Hernandez RN

## 2022-07-27 RX ORDER — ALPRAZOLAM 0.5 MG
TABLET ORAL
Qty: 60 TABLET | Refills: 0 | Status: SHIPPED | OUTPATIENT
Start: 2022-07-27 | End: 2023-01-02

## 2022-07-27 NOTE — TELEPHONE ENCOUNTER
Patient informed via mychart to schedule , 8/1 reminder if not read to send letter.   Jaycee Holliday MA

## 2022-07-28 ENCOUNTER — OFFICE VISIT (OUTPATIENT)
Dept: AUDIOLOGY | Facility: CLINIC | Age: 47
End: 2022-07-28
Payer: COMMERCIAL

## 2022-07-28 DIAGNOSIS — H90.3 BILATERAL SENSORINEURAL HEARING LOSS: Primary | ICD-10-CM

## 2022-07-28 PROCEDURE — V5020 CONFORMITY EVALUATION: HCPCS | Mod: LT | Performed by: AUDIOLOGIST

## 2022-07-28 PROCEDURE — V5020 CONFORMITY EVALUATION: HCPCS | Mod: RT | Performed by: AUDIOLOGIST

## 2022-07-28 PROCEDURE — 99207 PR NO CHARGE LOS: CPT | Performed by: AUDIOLOGIST

## 2022-07-28 PROCEDURE — V5160 DISPENSING FEE BINAURAL: HCPCS | Performed by: AUDIOLOGIST

## 2022-07-28 PROCEDURE — V5011 HEARING AID FITTING/CHECKING: HCPCS | Mod: RT | Performed by: AUDIOLOGIST

## 2022-07-28 PROCEDURE — V5011 HEARING AID FITTING/CHECKING: HCPCS | Mod: LT | Performed by: AUDIOLOGIST

## 2022-07-28 PROCEDURE — 92593 PR HEARING AID CHECK, BINAURAL: CPT | Performed by: AUDIOLOGIST

## 2022-07-28 PROCEDURE — V5261 HEARING AID, DIGIT, BIN, BTE: HCPCS | Mod: NU | Performed by: AUDIOLOGIST

## 2022-07-28 NOTE — PROGRESS NOTES
AUDIOLOGY REPORT    SUBJECTIVE:   Radha Beckwith, a 46 year old female, was seen in the Audiology Clinic at [facility] today for a Binaural hearing aid fitting. Previous results have revealed a bilateral normal sloping to severe sensorineural hearing loss. The patient was given medical clearance to pursue amplification by  MARILU Hough MD.      OBJECTIVE:    Prior to fitting, a hearing aid check was performed to ensure device functionality. The hearing aid conformity evaluation was completed.The hearing aids were placed and they provided a good fit. Real-ear-probe-microphone measurements were completed on the [employer] system and were a good match to NAL-NL2 target with soft sounds audible, moderate sounds comfortable, and loud sounds below discomfort. UCLs are verified through maximum power output measures and demonstrate appropriate limiting of loud inputs. Ms. Beckwith was oriented to proper hearing aid use, care, cleaning (no water, dry brush), batteries (size rechargeable, insertion/removal, toxicity, low-battery signal), aid insertion/removal, user booklet, warranty information, storage cases, and other hearing aid details. The patient confirmed understanding of hearing aid use and care, and showed proper insertion of hearing aid and batteries while in the office today. Ms. Beckwith reported good volume and sound quality today.    EAR(S) FIT: Binaural  MA HEARING AID MAKE: Right: Charge & Go 5 AX; Left: Charge & Go 5 AX  MA HEARING AID MODEL #: Right: C&G 5 AX; Left: C&G 5 AS  HEARING AID STYLE: Right: BTE/GUILLERMO; Left: BTE/GUILLERMO  DOME SIZE: Right:  Extra Small Closed Sleeve; Left::  Extra Small Closed Sleeve   LENGTH: Right:  MINIREC 3.0 1R M; Left:  MINIREC 3.0 1L M  SERIAL NUMBERS: Right: [id number]; Left: [id number]    WARRANTY END DATE: Right: 7/28/2025; Left:: 7/28/2025      CHARGES:     Hearing Aid Check: Binaural, 85876, $81.00  Dispensing Fee: Binaural, ,  $500.00  Fit/Orientation: Binaural, , $416.00  Hearing Aid Conformity Evaluation: 2, , $174.00RT, LT  Hearing Aid Digital: Binaural, BTE, ,NU (Binaural), $4,420.00  Total: $5600.00     ASSESSMENT:   Binaural Signia Pure Charge & Go 5 AX hearing aid fitting completed today. Verification measures were performed. The 45 day trial period was explained to patient, and they expressed understanding. Ms. Beckwith signed the Hearing Aid Purchase Agreement and was given a copy, as well as details on her hearing aids. Patient was counseled that exact out of pocket amounts cannot be determined for hearing aid claims being sent to insurance. Any insurance coverage information presented to the patient is an estimate only, and is not a guarantee of payment. Patient has been advised to check with their own insurance.    PLAN:   Ms. Beckwith will return for follow-up 9/23/2022 for a hearing aid review appointment, discussed I'll be out on leave and that there would be an audiologist here for any emergent needs. Please call this clinic with questions regarding today s appointment.    Jordan Rodriges Licensed Audiologist #[id number]

## 2022-07-28 NOTE — PATIENT INSTRUCTIONS

## 2022-08-11 ENCOUNTER — DOCUMENTATION ONLY (OUTPATIENT)
Dept: ORTHOPEDICS | Facility: CLINIC | Age: 47
End: 2022-08-11

## 2022-08-11 DIAGNOSIS — G56.01 CARPAL TUNNEL SYNDROME, RIGHT: Primary | ICD-10-CM

## 2022-08-11 DIAGNOSIS — R20.0 NUMBNESS: ICD-10-CM

## 2022-08-11 DIAGNOSIS — M79.642 PAIN OF LEFT HAND: ICD-10-CM

## 2022-08-11 DIAGNOSIS — R20.2 PARESTHESIA: ICD-10-CM

## 2022-08-16 DIAGNOSIS — F41.9 ANXIETY: ICD-10-CM

## 2022-08-16 DIAGNOSIS — F33.1 MODERATE EPISODE OF RECURRENT MAJOR DEPRESSIVE DISORDER (H): ICD-10-CM

## 2022-08-16 DIAGNOSIS — G89.29 CHRONIC NECK PAIN: ICD-10-CM

## 2022-08-16 DIAGNOSIS — M54.2 CHRONIC NECK PAIN: ICD-10-CM

## 2022-08-16 DIAGNOSIS — F51.04 PSYCHOPHYSIOLOGICAL INSOMNIA: ICD-10-CM

## 2022-08-17 NOTE — TELEPHONE ENCOUNTER
"   Requested Prescriptions   Pending Prescriptions Disp Refills    DULoxetine (CYMBALTA) 30 MG capsule [Pharmacy Med Name: DULOXETINE HCL 30MG CPEP] 180 capsule 0     Sig: Take 1 capsule (30 mg) by mouth 2 times daily        Serotonin-Norepinephrine Reuptake Inhibitors  Failed - 8/16/2022  3:59 PM        Failed - PHQ-9 score of less than 5 in past 6 months     Please review last PHQ-9 score.           Failed - Recent (6 mo) or future (30 days) visit within the authorizing provider's specialty     Patient had office visit in the last 6 months or has a visit in the next 30 days with authorizing provider or within the authorizing provider's specialty.  See \"Patient Info\" tab in inbasket, or \"Choose Columns\" in Meds & Orders section of the refill encounter.            Passed - Blood pressure under 140/90 in past 12 months       BP Readings from Last 3 Encounters:   07/12/22 138/81   07/01/22 107/71   06/29/22 (!) 143/87                 Passed - Medication is active on med list        Passed - Patient is age 18 or older        Passed - No active pregnancy on record        Passed - No positive pregnancy test in past 12 months           zolpidem (AMBIEN) 5 MG tablet [Pharmacy Med Name: ZOLPIDEM TARTRATE 5MG TABS] 30 tablet 0     Sig: Take 1 tablet (5 mg) by mouth nightly as needed for sleep        There is no refill protocol information for this order           CASTILLO Moyer, RN     "

## 2022-08-18 RX ORDER — ZOLPIDEM TARTRATE 5 MG/1
5 TABLET ORAL
Qty: 30 TABLET | Refills: 0 | Status: SHIPPED | OUTPATIENT
Start: 2022-08-18 | End: 2022-09-23

## 2022-08-18 RX ORDER — DULOXETIN HYDROCHLORIDE 30 MG/1
30 CAPSULE, DELAYED RELEASE ORAL 2 TIMES DAILY
Qty: 180 CAPSULE | Refills: 0 | Status: SHIPPED | OUTPATIENT
Start: 2022-08-18 | End: 2022-12-02

## 2022-08-30 ASSESSMENT — ANXIETY QUESTIONNAIRES
3. WORRYING TOO MUCH ABOUT DIFFERENT THINGS: NEARLY EVERY DAY
GAD7 TOTAL SCORE: 16
1. FEELING NERVOUS, ANXIOUS, OR ON EDGE: NEARLY EVERY DAY
8. IF YOU CHECKED OFF ANY PROBLEMS, HOW DIFFICULT HAVE THESE MADE IT FOR YOU TO DO YOUR WORK, TAKE CARE OF THINGS AT HOME, OR GET ALONG WITH OTHER PEOPLE?: EXTREMELY DIFFICULT
6. BECOMING EASILY ANNOYED OR IRRITABLE: NEARLY EVERY DAY
GAD7 TOTAL SCORE: 16
4. TROUBLE RELAXING: SEVERAL DAYS
5. BEING SO RESTLESS THAT IT IS HARD TO SIT STILL: NOT AT ALL
7. FEELING AFRAID AS IF SOMETHING AWFUL MIGHT HAPPEN: NEARLY EVERY DAY
2. NOT BEING ABLE TO STOP OR CONTROL WORRYING: NEARLY EVERY DAY
IF YOU CHECKED OFF ANY PROBLEMS ON THIS QUESTIONNAIRE, HOW DIFFICULT HAVE THESE PROBLEMS MADE IT FOR YOU TO DO YOUR WORK, TAKE CARE OF THINGS AT HOME, OR GET ALONG WITH OTHER PEOPLE: EXTREMELY DIFFICULT
7. FEELING AFRAID AS IF SOMETHING AWFUL MIGHT HAPPEN: NEARLY EVERY DAY
GAD7 TOTAL SCORE: 16

## 2022-09-01 DIAGNOSIS — F41.9 ANXIETY: ICD-10-CM

## 2022-09-01 DIAGNOSIS — R11.0 NAUSEA: ICD-10-CM

## 2022-09-02 ENCOUNTER — VIRTUAL VISIT (OUTPATIENT)
Dept: FAMILY MEDICINE | Facility: CLINIC | Age: 47
End: 2022-09-02
Payer: COMMERCIAL

## 2022-09-02 DIAGNOSIS — F11.21 OPIOID DEPENDENCE IN REMISSION (H): ICD-10-CM

## 2022-09-02 DIAGNOSIS — K21.9 GASTROESOPHAGEAL REFLUX DISEASE, UNSPECIFIED WHETHER ESOPHAGITIS PRESENT: ICD-10-CM

## 2022-09-02 DIAGNOSIS — F41.9 ANXIETY: ICD-10-CM

## 2022-09-02 DIAGNOSIS — Z12.11 SCREEN FOR COLON CANCER: ICD-10-CM

## 2022-09-02 DIAGNOSIS — E78.2 MIXED HYPERLIPIDEMIA: ICD-10-CM

## 2022-09-02 DIAGNOSIS — F33.1 MODERATE EPISODE OF RECURRENT MAJOR DEPRESSIVE DISORDER (H): Primary | ICD-10-CM

## 2022-09-02 PROCEDURE — 96127 BRIEF EMOTIONAL/BEHAV ASSMT: CPT | Mod: 95 | Performed by: NURSE PRACTITIONER

## 2022-09-02 PROCEDURE — 99214 OFFICE O/P EST MOD 30 MIN: CPT | Mod: 95 | Performed by: NURSE PRACTITIONER

## 2022-09-02 RX ORDER — SIMVASTATIN 10 MG
TABLET ORAL
Qty: 90 TABLET | Refills: 3 | Status: SHIPPED | OUTPATIENT
Start: 2022-09-02 | End: 2023-08-31

## 2022-09-02 RX ORDER — FAMOTIDINE 40 MG/1
40 TABLET, FILM COATED ORAL DAILY
Qty: 30 TABLET | Refills: 11 | Status: SHIPPED | OUTPATIENT
Start: 2022-09-02 | End: 2023-05-23

## 2022-09-02 RX ORDER — ALPRAZOLAM 1 MG
1 TABLET ORAL 2 TIMES DAILY
Qty: 60 TABLET | Refills: 0 | Status: SHIPPED | OUTPATIENT
Start: 2022-09-02 | End: 2022-10-02

## 2022-09-02 ASSESSMENT — PATIENT HEALTH QUESTIONNAIRE - PHQ9
10. IF YOU CHECKED OFF ANY PROBLEMS, HOW DIFFICULT HAVE THESE PROBLEMS MADE IT FOR YOU TO DO YOUR WORK, TAKE CARE OF THINGS AT HOME, OR GET ALONG WITH OTHER PEOPLE: EXTREMELY DIFFICULT
SUM OF ALL RESPONSES TO PHQ QUESTIONS 1-9: 16

## 2022-09-02 ASSESSMENT — ANXIETY QUESTIONNAIRES: GAD7 TOTAL SCORE: 16
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
## 2022-09-06 RX ORDER — ONDANSETRON 4 MG/1
TABLET, ORALLY DISINTEGRATING ORAL
Qty: 20 TABLET | Refills: 1 | Status: SHIPPED | OUTPATIENT
Start: 2022-09-06 | End: 2022-11-08

## 2022-09-14 DIAGNOSIS — G25.81 RESTLESS LEGS SYNDROME (RLS): ICD-10-CM

## 2022-09-16 RX ORDER — ROPINIROLE 1 MG/1
TABLET, FILM COATED ORAL
Qty: 90 TABLET | Refills: 0 | Status: SHIPPED | OUTPATIENT
Start: 2022-09-16 | End: 2022-12-29

## 2022-09-16 NOTE — TELEPHONE ENCOUNTER
"Requested Prescriptions   Pending Prescriptions Disp Refills    rOPINIRole (REQUIP) 1 MG tablet [Pharmacy Med Name: ROPINIROLE HCL 1MG TABS] 90 tablet 0     Sig: TAKE ONE TABLET BY MOUTH EVERY NIGHT AT BEDTIME        Antiparkinson's Agents Protocol Failed - 9/14/2022 12:09 PM        Failed - CBC on record in past 12 months     Recent Labs   Lab Test 07/06/21  1153   WBC 10.2   RBC 4.44   HGB 13.6   HCT 40.7                      Failed - ALT on record in past 12 months         Recent Labs   Lab Test 07/06/21  1153   ALT 34               Passed - Blood pressure under 140/90 in past 12 months       BP Readings from Last 3 Encounters:   07/12/22 138/81   07/01/22 107/71   06/29/22 (!) 143/87                 Passed - Serum Creatinine on file in past 12 months     Recent Labs   Lab Test 06/29/22  0958   CR 0.68       Ok to refill medication if creatinine is low          Passed - Medication is active on med list        Passed - Patient is age 18 or older        Passed - No active pregnancy on record        Passed - No positive pregnancy test in the past 12 months        Passed - Recent (6 mo) or future (30 days) visit within the authorizing provider's specialty     Patient had office visit in the last 6 months or has a visit in the next 30 days with authorizing provider or within the authorizing provider's specialty.  See \"Patient Info\" tab in inbasket, or \"Choose Columns\" in Meds & Orders section of the refill encounter.                    DIONY SlaughterN, RN          "

## 2022-09-22 DIAGNOSIS — F51.04 PSYCHOPHYSIOLOGICAL INSOMNIA: ICD-10-CM

## 2022-09-23 ENCOUNTER — TELEPHONE (OUTPATIENT)
Dept: FAMILY MEDICINE | Facility: CLINIC | Age: 47
End: 2022-09-23

## 2022-09-23 DIAGNOSIS — R92.8 ABNORMAL MAMMOGRAM: Primary | ICD-10-CM

## 2022-09-23 RX ORDER — ZOLPIDEM TARTRATE 5 MG/1
TABLET ORAL
Qty: 30 TABLET | Refills: 0 | Status: SHIPPED | OUTPATIENT
Start: 2022-09-23 | End: 2022-10-31

## 2022-09-23 NOTE — TELEPHONE ENCOUNTER
----- Message from Gisselle Linn NP sent at 3/24/2022 12:02 PM CDT -----  Six month follow up us and mammogram

## 2022-09-23 NOTE — TELEPHONE ENCOUNTER
Requested Prescriptions   Pending Prescriptions Disp Refills    zolpidem (AMBIEN) 5 MG tablet [Pharmacy Med Name: ZOLPIDEM TARTRATE 5MG TABS] 30 tablet 0     Sig: TAKE ONE TABLET (5 MG) BY MOUTH NIGHTLY AS NEEDED FOR SLEEP        There is no refill protocol information for this order             Edith Nova, DIONYN, RN

## 2022-10-04 ENCOUNTER — VIRTUAL VISIT (OUTPATIENT)
Dept: BEHAVIORAL HEALTH | Facility: CLINIC | Age: 47
End: 2022-10-04
Payer: COMMERCIAL

## 2022-10-04 DIAGNOSIS — F41.9 ANXIETY: Primary | ICD-10-CM

## 2022-10-04 DIAGNOSIS — F33.1 MODERATE EPISODE OF RECURRENT MAJOR DEPRESSIVE DISORDER (H): ICD-10-CM

## 2022-10-04 DIAGNOSIS — F41.1 GAD (GENERALIZED ANXIETY DISORDER): Primary | ICD-10-CM

## 2022-10-04 PROCEDURE — 90834 PSYTX W PT 45 MINUTES: CPT | Mod: 95 | Performed by: SOCIAL WORKER

## 2022-10-04 ASSESSMENT — ANXIETY QUESTIONNAIRES
1. FEELING NERVOUS, ANXIOUS, OR ON EDGE: NEARLY EVERY DAY
5. BEING SO RESTLESS THAT IT IS HARD TO SIT STILL: NOT AT ALL
GAD7 TOTAL SCORE: 15
2. NOT BEING ABLE TO STOP OR CONTROL WORRYING: NEARLY EVERY DAY
7. FEELING AFRAID AS IF SOMETHING AWFUL MIGHT HAPPEN: NOT AT ALL
6. BECOMING EASILY ANNOYED OR IRRITABLE: NEARLY EVERY DAY
IF YOU CHECKED OFF ANY PROBLEMS ON THIS QUESTIONNAIRE, HOW DIFFICULT HAVE THESE PROBLEMS MADE IT FOR YOU TO DO YOUR WORK, TAKE CARE OF THINGS AT HOME, OR GET ALONG WITH OTHER PEOPLE: EXTREMELY DIFFICULT
3. WORRYING TOO MUCH ABOUT DIFFERENT THINGS: NEARLY EVERY DAY
4. TROUBLE RELAXING: NEARLY EVERY DAY

## 2022-10-04 ASSESSMENT — COLUMBIA-SUICIDE SEVERITY RATING SCALE - C-SSRS
1. IN THE PAST MONTH, HAVE YOU WISHED YOU WERE DEAD OR WISHED YOU COULD GO TO SLEEP AND NOT WAKE UP?: NO
2. HAVE YOU ACTUALLY HAD ANY THOUGHTS OF KILLING YOURSELF IN THE PAST MONTH?: NO
6. HAVE YOU EVER DONE ANYTHING, STARTED TO DO ANYTHING, OR PREPARED TO DO ANYTHING TO END YOUR LIFE?: YES
4. HAVE YOU HAD THESE THOUGHTS AND HAD SOME INTENTION OF ACTING ON THEM?: NO
3. HAVE YOU BEEN THINKING ABOUT HOW YOU MIGHT KILL YOURSELF?: NO
5. HAVE YOU STARTED TO WORK OUT OR WORKED OUT THE DETAILS OF HOW TO KILL YOURSELF? DO YOU INTEND TO CARRY OUT THIS PLAN?: YES

## 2022-10-04 ASSESSMENT — PATIENT HEALTH QUESTIONNAIRE - PHQ9
10. IF YOU CHECKED OFF ANY PROBLEMS, HOW DIFFICULT HAVE THESE PROBLEMS MADE IT FOR YOU TO DO YOUR WORK, TAKE CARE OF THINGS AT HOME, OR GET ALONG WITH OTHER PEOPLE: EXTREMELY DIFFICULT
SUM OF ALL RESPONSES TO PHQ QUESTIONS 1-9: 18
SUM OF ALL RESPONSES TO PHQ QUESTIONS 1-9: 18

## 2022-10-04 NOTE — TELEPHONE ENCOUNTER
Requested Prescriptions   Pending Prescriptions Disp Refills    ALPRAZolam (XANAX) 1 MG tablet [Pharmacy Med Name: ALPRAZOLAM 1MG TABS] 60 tablet 0     Sig: TAKE 1 TABLET (1 MG) BY MOUTH 2 TIMES DAILY        There is no refill protocol information for this order           Phuong Antonio, DIONYN, RN

## 2022-10-04 NOTE — PROGRESS NOTES
[facility]   Mental Health & Addiction Services     Progress Note - Initial Visit    Patient  Name:  Radha Beckwith Date: 10/4/22         Service Type: Individual     Visit Start Time: 1100  Visit End Time: 115    Visit #: 1    Attendees: Client    Service Modality:  Video Visit:      Provider verified identity through the following two step process.  Patient provided:  Patient     Telemedicine Visit: The patient's condition can be safely assessed and treated via synchronous audio and visual telemedicine encounter.      Reason for Telemedicine Visit: Patient has requested telehealth visit    Originating Site (Patient Location): Patient's home    Distant Site (Provider Location): Provider Remote Setting- Home Office    Consent:  The patient/guardian has verbally consented to: the potential risks and benefits of telemedicine (video visit) versus in person care; bill my insurance or make self-payment for services provided; and responsibility for payment of non-covered services.     Patient would like the video invitation sent by:  Send to e-mail at: [email or web address]    Mode of Communication:  Video Conference via Amwell    As the provider I attest to compliance with applicable laws and regulations related to telemedicine.     As the provider I attest to compliance with applicable laws and regulations related to telemedicine.  Patient expressed comprehension and acceptance of informed consent and the nature of limitations to confidentiality due to the mandated reporting requirement that was reviewed during session.         DATA:   Interactive Complexity: No   Crisis: No     Presenting Concerns/  Current Stressors:   Patient is a 46 yr old female who is self referred for Psychotherapy. Patient stated, that she has a Hx of Anxiety and Depressed mood. Patient is single although, shares her townhome with her significant other. Pt. Has a 25 yr. old son who currently resides in [place] and  is a .  Patient had been working full-time up until recently when her back pain became more problematic and now is in the process of seeing specialist to remedy that situation. Current symptoms include: Little interest and pleasure in doing things, feeling down, trouble falling asleep and staying asleep, trouble concentrating, feeling nervous and anxious, worrying about different things, trouble relaxing, easily annoyed and irritable. Patient indicated that 2008 she attempted suicide and was sent to [place] for inpatient care.  Has had no intentions or attempts since that time.  Due to a number of recent stressors she is hoping to be able to learn techniques and strategies to improve her mood.    ASSESSMENT:  Mental Status Assessment:  Appearance:   Appropriate   Eye Contact:   Good   Psychomotor Behavior: Normal   Attitude:   Cooperative   Orientation:   All  Speech   Rate / Production: Normal/ Responsive   Volume:  Normal   Mood:    Anxious  Sad   Affect:    Appropriate   Thought Content:  Clear   Thought Form:  Coherent   Insight:    Good  and Fair       Safety Issues and Plan for Safety and Risk Management:     [place] Suicide Severity Rating Scale (Lifetime/Recent)  [place] Suicide Severity Rating (Lifetime/Recent) 10/4/2022   Q1 Wished to be Dead (Past Month) no   Q2 Suicidal Thoughts (Past Month) no   Q3 Suicidal Thought Method no   Q4 Suicidal Intent without Specific Plan no   Q5 Suicide Intent with Specific Plan yes   Q6 Suicide Behavior (Lifetime) yes   Within the Past 3 Months? no   Level of Risk per Screen high risk        PHQ 6/27/2022 8/30/2022 10/4/2022   PHQ-9 Total Score 12 16 18   Q9: Thoughts of better off dead/self-harm past 2 weeks Not at all Not at all Not at all     .  BO-7 SCORE 12/2/2021 6/27/2022 8/30/2022   Total Score 17 (severe anxiety) 12 (moderate anxiety) 16 (severe anxiety)   Total Score 17 12 16     Patient denies current fears or concerns for personal  safety.  Patient denies current or recent suicidal ideation or behaviors.  Patient denies current or recent homicidal ideation or behaviors.  Patient denies current or recent self injurious behavior or ideation.  Patient denies other safety concerns.  Recommended that patient call 911 or go to the local ED should there be a change in any of these risk factors.  Patient reports there are no firearms in the house.     Diagnostic Criteria:  Generalized Anxiety Disorder  A. Excessive anxiety and worry about a number of events or activities (such as work or school performance).   B. The person finds it difficult to control the worry.  C. Select 3 or more symptoms (required for diagnosis). Only one item is required in children.   - Restlessness or feeling keyed up or on edge.    - Being easily fatigued.    - Difficulty concentrating or mind going blank.    - Irritability.    - Muscle tension.    - Sleep disturbance (difficulty falling or staying asleep, or restless unsatisfying sleep).   D. The focus of the anxiety and worry is not confined to features of an Axis I disorder.  E. The anxiety, worry, or physical symptoms cause clinically significant distress or impairment in social, occupational, or other important areas of functioning.   F. The disturbance is not due to the direct physiological effects of a substance (e.g., a drug of abuse, a medication) or a general medical condition (e.g., hyperthyroidism) and does not occur exclusively during a Mood Disorder, a Psychotic Disorder, or a Pervasive Developmental Disorder.    - The aformentioned symptoms began 2+ year(s) ago and occurs 7 days per week and is experienced as moderate. Major Depressive Disorder  CRITERIA (A-C) REPRESENT A MAJOR DEPRESSIVE EPISODE - SELECT THESE CRITERIA  A) Recurrent episode(s) - symptoms have been present during the same 2-week period and represent a change from previous functioning 5 or more symptoms (required for diagnosis)   - Depressed  mood. Note: In children and adolescents, can be irritable mood.     - Diminished interest or pleasure in all, or almost all, activities.    - Decreased sleep.    - Psychomotor activity retardation.    - Fatigue or loss of energy.    - Feelings of worthlessness or inappropriate and excessive guilt.    - Diminished ability to think or concentrate, or indecisiveness.   B) The symptoms cause clinically significant distress or impairment in social, occupational, or other important areas of functioning  C) The episode is not attributable to the physiological effects of a substance or to another medical condition  D) The occurence of major depressive episode is not better explained by other thought / psychotic disorders  E) There has never been a manic episode or hypomanic episode         DSM5 Diagnoses: (Sustained by DSM5 Criteria Listed Above)  Diagnoses: Generalized Anxiety Disorder  Moderate episode of recurrent major depressive Disorder (H)  Psychosocial & Contextual Factors:   WHODAS 2.0 (12 item): No flowsheet data found.  Intervention:   CBT, Solution focused Therapy  Collateral Reports Completed:  Routed note to PCP      PLAN: (Homework, other):  1. Provider will continue Diagnostic Assessment.  Patient was given the following to do until next session:        Begin Identifying wants and needs.    2. Provider recommended the following referrals: Not at this time      3.  Suicide Risk and Safety Concerns were assessed for Radha Beckwith.   (Has not had any any current suicidal thoughts or intentions.)     4. Developing Therapeutic relationship.      Swetha Weinberg [facility] October 4, 2022        Answers for HPI/ROS submitted by the patient on 10/4/2022  If you checked off any problems, how difficult have these problems made it for you to do your work, take care of things at home, or get along with other people?: Extremely difficult  PHQ9 TOTAL SCORE: 18

## 2022-10-05 RX ORDER — ALPRAZOLAM 1 MG
TABLET ORAL
Qty: 60 TABLET | Refills: 0 | Status: SHIPPED | OUTPATIENT
Start: 2022-10-05 | End: 2022-11-08

## 2022-10-07 ENCOUNTER — OFFICE VISIT (OUTPATIENT)
Dept: AUDIOLOGY | Facility: CLINIC | Age: 47
End: 2022-10-07
Payer: COMMERCIAL

## 2022-10-07 DIAGNOSIS — H90.3 BILATERAL SENSORINEURAL HEARING LOSS: Primary | ICD-10-CM

## 2022-10-07 PROCEDURE — V5010 ASSESSMENT FOR HEARING AID: HCPCS | Performed by: AUDIOLOGIST

## 2022-10-07 PROCEDURE — 99207 PR NO CHARGE LOS: CPT | Performed by: AUDIOLOGIST

## 2022-10-07 NOTE — PROGRESS NOTES
AUDIOLOGY REPORT    SUBJECTIVE:  Radha Beckwith is a 46 year old female who was seen in the Audiology Clinic at the [facility] on 10/7/2022  for a follow-up check regarding the fitting of new hearing aids. Previous results have revealed normal sloping to moderate sensorineural hearing loss bilaterally.  The patient was fit with binaural Signia Pure Charge & Go 5 AX RICs on 7/28/2022.  Radha reports overall good hearing, but small sleeves are not staying in her ears which is very frustrating. She also wants to get connected to her iPhone.      OBJECTIVE:   Based on patient report, the following changes were made; no programming changes. Added a canal/sport lock and changed to small closed domes (Sonova). Install jn, connected hearing aids to iPhone. Successfully streamed audio and a phone call. Reviewed navigating the jn.    Reviewed 45 day trial period, care, cleaning (no water, dry brush), batteries (size rechargeable) insertion/removal, toxicity, low-battery signal), aid insertion/removal, volume adjustment (if applicable), user booklet, warranty information, storage cases, and other hearing aid details.     No charge visit today (in warranty hearing aid check).    ASSESSMENT:   A follow-up appointment for hearing aid fitting was completed today. Changes to hearing aid was completed as outlined above.     PLAN:  Recommended return for earmold impressions for custom canal earmolds if new domes do not improve retention and decrease frustration. If new domes work recommend follow up in 3 months sooner if there are problems. Please call this clinic with any questions regarding today s appointment.    Jordan Rodriges Licensed Audiologist #[id number]

## 2022-10-10 ENCOUNTER — HOSPITAL ENCOUNTER (OUTPATIENT)
Dept: MAMMOGRAPHY | Facility: CLINIC | Age: 47
Discharge: HOME | End: 2022-10-10
Attending: NURSE PRACTITIONER | Admitting: NURSE PRACTITIONER
Payer: COMMERCIAL

## 2022-10-10 DIAGNOSIS — R92.8 ABNORMAL MAMMOGRAM: ICD-10-CM

## 2022-10-10 PROCEDURE — 77061 BREAST TOMOSYNTHESIS UNI: CPT | Mod: RT

## 2022-10-14 ENCOUNTER — TELEPHONE (OUTPATIENT)
Dept: GASTROENTEROLOGY | Facility: CLINIC | Age: 47
End: 2022-10-14

## 2022-10-14 NOTE — TELEPHONE ENCOUNTER
Screening Questions  BLUE  KIND OF PREP RED  LOCATION [review exclusion criteria] GREEN  SEDATION TYPE        Y Are you active on mychart?      Gisselle Linn, KIANNA   Ordering/Referring Provider?        BLUE PLUS What type of coverage do you have?      N Have you had a positive covid test in the last 90 days?     29.7 1. BMI  [BMI 40+ - review exclusion criteria]    Y  2. Are you able to give consent for your medical care? [IF NO,RN REVIEW]        NA  3. Are you taking any prescription pain medications on a routine schedule?      N  3a. EXTENDED PREP What kind of prescription?     N 4. Do you have any chemical dependencies such as alcohol, street drugs, or methadone?    N 5. Do you have any history of post-traumatic stress syndrome, severe anxiety or history of psychosis?      **If yes 3- 5 , please schedule with MAC sedation.**          IF YES TO ANY 6 - 10 - HOSPITAL SETTING ONLY.     N 6.   Do you need assistance transferring?     N 7.   Have you had a heart or lung transplant?    N 8.   Are you currently on dialysis?   N 9.   Do you use daily home oxygen?   N 10. Do you take nitroglycerin?   10a. NA If yes, how often?     11. [FEMALES]  N Are you currently pregnant?    11a. NA If yes, how many weeks? [ Greater than 12 weeks, OR NEEDED]    N 12. Do you have Pulmonary Hypertension? *NEED PAC APPT AT [facility]*      N 13. [review exclusion criteria]  Do you have any implantable devices in your body (pacemaker, defib, LVAD)?    N 14. In the past 6 months, have you had any heart related issues including cardiomyopathy or heart attack?     14a. NA If yes, did it require cardiac stenting if so when?     N 15. Have you had a stroke or Transient ischemic attack (TIA - aka  mini stroke ) within 6 months?      N 16. Do you have mod to severe Obstructive Sleep Apnea?  [Hospital only - Ok at [place]]    N 17. Do you have SEVERE AND UNCONTROLLED asthma? *NEED PAC APPT AT [facility]*     N 18. Are you currently taking any blood  "thinners?     18a. If yes, inform patient to \"follow up w/ ordering provider for bridging instructions.\"    N 19. Do you take the medication Phentermine?    19a. If yes, \"Hold for 7 days before procedure.  Please consult your prescribing provider if you have questions about holding this medication.\"     N  20. Do you have chronic kidney disease?      N  21. Do you have a diagnosis of diabetes?     Y  22. On a regular basis do you go 3-5 days between bowel movements?      23. Preferred LOCAL Pharmacy for Pre Prescription    [ LIST ONLY ONE PHARMACY]        [place], [place] - [address] [place] [address] DR      - CLOSING REMINDERS -    Informed patient they will need an adult    Cannot take any type of public or medical transportation alone    Conscious Sedation- Needs  for 6 hours after the procedure       MAC/General-Needs  for 24 hours after procedure    Pre-Procedure Covid test to be completed [[facility] PCR Testing Required]    Confirmed Nurse will call to complete assessment       - SCHEDULING DETAILS -    MICHAEL  Surgeon   1/06/2023  Date of Procedure  Upper and Lower Endoscopy [EGD and Colonoscopy]Type of Procedure Scheduled  PH Location  EXTENDED PREP - If you answer yes to questions #1, #3, #22 (De Wilneren and CF pts)Which Colonoscopy Prep was Sent?     MAC Sedation Type     N PAC / Pre-op Required       Additional comments:            "

## 2022-10-26 ENCOUNTER — VIRTUAL VISIT (OUTPATIENT)
Dept: BEHAVIORAL HEALTH | Facility: CLINIC | Age: 47
End: 2022-10-26
Payer: COMMERCIAL

## 2022-10-26 DIAGNOSIS — F33.1 MODERATE EPISODE OF RECURRENT MAJOR DEPRESSIVE DISORDER (H): ICD-10-CM

## 2022-10-26 DIAGNOSIS — F41.1 GAD (GENERALIZED ANXIETY DISORDER): Primary | ICD-10-CM

## 2022-10-26 PROCEDURE — 90791 PSYCH DIAGNOSTIC EVALUATION: CPT | Mod: 95 | Performed by: SOCIAL WORKER

## 2022-10-26 ASSESSMENT — ANXIETY QUESTIONNAIRES
1. FEELING NERVOUS, ANXIOUS, OR ON EDGE: NEARLY EVERY DAY
IF YOU CHECKED OFF ANY PROBLEMS ON THIS QUESTIONNAIRE, HOW DIFFICULT HAVE THESE PROBLEMS MADE IT FOR YOU TO DO YOUR WORK, TAKE CARE OF THINGS AT HOME, OR GET ALONG WITH OTHER PEOPLE: EXTREMELY DIFFICULT
GAD7 TOTAL SCORE: 16
7. FEELING AFRAID AS IF SOMETHING AWFUL MIGHT HAPPEN: MORE THAN HALF THE DAYS
GAD7 TOTAL SCORE: 16
8. IF YOU CHECKED OFF ANY PROBLEMS, HOW DIFFICULT HAVE THESE MADE IT FOR YOU TO DO YOUR WORK, TAKE CARE OF THINGS AT HOME, OR GET ALONG WITH OTHER PEOPLE?: EXTREMELY DIFFICULT
GAD7 TOTAL SCORE: 16
6. BECOMING EASILY ANNOYED OR IRRITABLE: NEARLY EVERY DAY
4. TROUBLE RELAXING: SEVERAL DAYS
3. WORRYING TOO MUCH ABOUT DIFFERENT THINGS: NEARLY EVERY DAY
7. FEELING AFRAID AS IF SOMETHING AWFUL MIGHT HAPPEN: MORE THAN HALF THE DAYS
5. BEING SO RESTLESS THAT IT IS HARD TO SIT STILL: SEVERAL DAYS
2. NOT BEING ABLE TO STOP OR CONTROL WORRYING: NEARLY EVERY DAY

## 2022-10-26 NOTE — PROGRESS NOTES
[facility]  Provider Name: Swetha Weinberg LICSW         PATIENT'S NAME: Radha Beckwith  PREFERRED NAME: Linette  PRONOUNS: she she  MRN: [id number]  : 1975  ADDRESS: [address] [place] [id number]  ACCT. NUMBER:  [id number]  DATE OF SERVICE: 10/26/22  START TIME: 1300  END TIME: 1352  PREFERRED PHONE: [phone]  May we leave a program related message: Yes  SERVICE MODALITY:  Video Visit:      Provider verified identity through the following two step process.  Patient provided:  Patient is known previously to provider    Telemedicine Visit: The patient's condition can be safely assessed and treated via synchronous audio and visual telemedicine encounter.      Reason for Telemedicine Visit: Patient has requested telehealth visit    Originating Site (Patient Location): Patient's home    Distant Site (Provider Location): Provider Remote Setting- Home Office    Consent:  The patient/guardian has verbally consented to: the potential risks and benefits of telemedicine (video visit) versus in person care; bill my insurance or make self-payment for services provided; and responsibility for payment of non-covered services.     Patient would like the video invitation sent by:  Send to e-mail at: [email or web address]    Mode of Communication:  Video Conference via [facility]    Distant Location (Provider):  Off-site    As the provider I attest to compliance with applicable laws and regulations related to telemedicine.    UNIVERSAL ADULT Mental Health DIAGNOSTIC ASSESSMENT    Identifying Information:  Patient is a 46 year old,   individual.    Patient was referred for an assessment by primary care clinic.  Patient attended the session alone.    Chief Complaint:   The reason for seeking services at this time is:   Patient is a 46 yr old female who is self referred for Psychotherapy. Patient stated, that she has a Hx of Anxiety and Depressed mood. Patient is [place] although,  [facility] [place]. Pt. Has a 25 yr. old son who currently resides in [place] and is a .  Patient had been working full-time up until recently when her back pain became more problematic and now is in the process of seeing specialist to remedy that situation. Current symptoms include: Little interest and pleasure in doing things, feeling down, trouble falling asleep and staying asleep, trouble concentrating, feeling nervous and anxious, worrying about different things, trouble relaxing, easily annoyed and irritable. Patient indicated that 2008 she attempted suicide and was sent to [facility] for inpatient care.  Has had no intentions or attempts since that time.  Due to a number of recent stressors she is hoping to be able to learn techniques and strategies to improve her mood. The problem(s) began approximately 7 years ago.     Social/Family History:  Patient reported they grew up in [place], [place], patient was raised by her biological mother and stepfather. Patient reported that their childhood was (did not respond. ) Patient described their current relationships with family of origin as inconsistent.  Patient reports having 1 sibling and 7 stepsiblings.    The patient describes their cultural background as .  Cultural influences and impact on patient's life structure, values, norms, and healthcare: .  Contextual influences on patient's health include: Economic Factors Able to make ends meet.. These factors will be addressed in the Preliminary Treatment plan.  Patient identified their preferred language to be English. Patient reported they do not  need the assistance of an  or other support involved in therapy.     Patient reported had no significant delays in developmental tasks.   Patient's highest education level was some college. Patient identified the following learning problems: none reported.  Modifications will not be used to assist communication  in therapy.  Patient reports they are  able to understand written materials.    Patient reported the following relationship history has a significant other whom she lives with.  Patient has a 25-year-old son who resides out in [place] city.  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 Patient identified their sexual orientation as heterosexual.  Patient reported having Patient identified significant other as part of their support system.  Patient identified the quality of these relationships as good.     Patient's current living/housing situation involves staying with Significant other and they report that housing is stable.     Patient is currently unemployed.  Patient reports their finances are obtained through [employer].  Patient does identify finances as a current stressor.      Patient reported that they have not been involved with the legal system. Patient denies being on probation / parole / under the jurisdiction of the court.      Patient's Strengths and Limitations:    Patient identified the following strengths or resources that will help them succeed in treatment: insight and motivation. Things that may interfere with the patient's success in treatment include: financial hardship and physical health concerns.     Assessments:  The following assessments were completed by patient for this visit:  PHQ9:   PHQ-9 SCORE 9/12/2020 2/23/2021 7/9/2021 12/2/2021 6/27/2022 8/30/2022 10/4/2022   PHQ-9 Total Score MyChart 8 (Mild depression) 11 (Moderate depression) 18 (Moderately severe depression) 13 (Moderate depression) 12 (Moderate depression) 16 (Moderately severe depression) 18 (Moderately severe depression)   PHQ-9 Total Score 8 11 18 13 12 16 18     GAD7:   BO-7 SCORE 7/15/2020 9/12/2020 7/9/2021 12/2/2021 6/27/2022 8/30/2022 10/26/2022   Total Score - 17 (severe anxiety) 11 (moderate anxiety) 17 (severe anxiety) 12 (moderate anxiety) 16 (severe anxiety) 16 (severe anxiety)   Total  Score 15 17 11 17 12 16 16     PROMIS 10-Global Health (all questions and answers displayed):   PROMIS 10 10/26/2022   In general, would you say your health is: Fair   In general, would you say your quality of life is: Poor   In general, how would you rate your physical health? Poor   In general, how would you rate your mental health, including your mood and your ability to think? Fair   In general, how would you rate your satisfaction with your social activities and relationships? Poor   In general, please rate how well you carry out your usual social activities and roles Poor   To what extent are you able to carry out your everyday physical activities such as walking, climbing stairs, carrying groceries, or moving a chair? A little   How often have you been bothered by emotional problems such as feeling anxious, depressed or irritable? Always   How would you rate your fatigue on average? Moderate   How would you rate your pain on average?   0 = No Pain  to  10 = Worst Imaginable Pain 8   In general, would you say your health is: 2   In general, would you say your quality of life is: 1   In general, how would you rate your physical health? 1   In general, how would you rate your mental health, including your mood and your ability to think? 2   In general, how would you rate your satisfaction with your social activities and relationships? 1   In general, please rate how well you carry out your usual social activities and roles. (This includes activities at home, at work and in your community, and responsibilities as a parent, child, spouse, employee, friend, etc.) 1   To what extent are you able to carry out your everyday physical activities such as walking, climbing stairs, carrying groceries, or moving a chair? 2   In the past 7 days, how often have you been bothered by emotional problems such as feeling anxious, depressed, or irritable? 5   In the past 7 days, how would you rate your fatigue on average? 3   In  the past 7 days, how would you rate your pain on average, where 0 means no pain, and 10 means worst imaginable pain? 8   Global Mental Health Score 5   Global Physical Health Score 8   PROMIS TOTAL - SUBSCORES 13   Some recent data might be hidden       Personal and Family Medical History:  Patient does not report a family history of mental health concerns.  Patient reports family history includes Adrenal Disorder in an other family member; Breast Cancer in her cousin; Cerebrovascular Disease in her father; Chronic Obstructive Pulmonary Disease in her mother and another family member; Depression in her mother; Respiratory in her mother..     Patient does not report Mental Health Diagnosis or Treatment.      Patient has had a physical exam to rule out medical causes for current symptoms.  Date of last physical exam was within the past year. Client was encouraged to follow up with PCP if symptoms were to develop. The patient has a [facility] Provider, who is named Gisselle Linn.  Patient reports the following current medical concerns: pain. .  Patient denies any issues with pain..   There are not significant appetite / nutritional concerns / weight changes. These may include: no concerns. Patient reports the following sleep concerns:  No concerns.   Patient does not report a history of head injury / trauma / cognitive impairment.      Current Outpatient Medications   Medication     acetaminophen (TYLENOL) 500 MG tablet     ALPRAZolam (XANAX) 0.5 MG tablet     ALPRAZolam (XANAX) 1 MG tablet     buprenorphine HCl-naloxone HCl (SUBOXONE) 2-0.5 MG per film     cetirizine (ZYRTEC) 10 MG tablet     cyclobenzaprine (FLEXERIL) 5 MG tablet     DULoxetine (CYMBALTA) 30 MG capsule     famotidine (PEPCID) 40 MG tablet     lactulose (CHRONULAC) 10 GM/15ML solution     Melatonin 10 MG TABS tablet     omeprazole (PRILOSEC) 40 MG DR capsule     ondansetron (ZOFRAN ODT) 4 MG ODT tab     QUEtiapine (SEROQUEL) 200 MG  tablet     rOPINIRole (REQUIP) 1 MG tablet     simvastatin (ZOCOR) 10 MG tablet     zolpidem (AMBIEN) 5 MG tablet     No current facility-administered medications for this visit.       Medication Adherence:yes      Patient Allergies:    Allergies   Allergen Reactions     Cafergot      12-            GI problems-     Seasonal Allergies      Sumatriptan      vomits after giving herself a shot     Compazine Anxiety     Droperidol Anxiety     Nubain [Nalbuphine Hcl] Anxiety     Prochlorperazine Palpitations     Uncontrolled movement       Medical History:    Past Medical History:   Diagnosis Date     Abdominal pain, right lower quadrant 03/09/2008    Admit. Discharged 03/10/08     Anxiety attack 07/31/2015     Atypical chest pain 06/23/2015     De Quervain's disease (tenosynovitis)      Dehydration      Gastric ulcer 07/31/2015     GERD (gastroesophageal reflux disease) 07/28/2010    Takes omeprazole and metoclopramide      Ingrowing nail 01/09/2014     Migraines      Opioid dependence in remission (H) 08/02/2015     Other and unspecified ovarian cyst      Papanicolaou smear of cervix with low grade squamous intraepithelial lesion (LGSIL) 07/07/2017         Current Mental Status Exam:   Appearance:  Appropriate    Eye Contact:  Good   Psychomotor:  Normal       Gait / station:  slow  Attitude / Demeanor: Cooperative   Speech      Rate / Production: Normal/ Responsive      Volume:  Normal  volume      Language:  intact  Mood:   Sad   Affect:   Appropriate    Thought Content: Clear   Thought Process: Coherent       Associations: No loosening of associations  Insight:   Good   Judgment:  Intact   Orientation:  All  Attention/concentration: Good      Substance Use:  No current issues    Significant Losses / Trauma / Abuse / Neglect Issues:   Patient did not  serve in the .  There are indications or report of significant loss, trauma, abuse or neglect issues related to:   Concerns for possible neglect None  indicated.    Safety Assessment:   Patient denies current homicidal ideation and behaviors.  Patient denies current self-injurious ideation and behaviors.    Patient denied risk behaviors associated with substance use.  Patient denies any high risk behaviors associated with mental health symptoms.  Patient reports the following current concerns for their personal safety: None.  Patient reports there   firearms in the house.  There are no firearms in the home..    History of Safety Concerns:  Patient denied a history of homicidal ideation.     Patient denied a history of personal safety concerns.    Patient denied a history of assaultive behaviors.    Patient denied a history of sexual assault behaviors.     Patient denied a history of risk behaviors associated with substance use.  Patient denies any history of high risk behaviors associated with mental health symptoms.  Patient reports the following protective factors:     Risk Plan:  See Recommendations for Safety and Risk Management Plan    Review of Symptoms per patient report:  Depression: No symptoms, Lack of interest, Change in energy level, Difficulties concentrating, Feelings of hopelessness, Irritability, Feeling sad, down, or depressed, Withdrawn and Frequent crying  Laura:  No Symptoms  Psychosis: No Symptoms  Anxiety: Excessive worry, Nervousness, Physical complaints, such as headaches, stomachaches, muscle tension, Social anxiety and Poor concentration  Panic:  No symptoms  Post Traumatic Stress Disorder:  No Symptoms   Eating Disorder: No Symptoms  ADD / ADHD:  No symptoms  Conduct Disorder: No symptoms  Autism Spectrum Disorder: No symptoms  Obsessive Compulsive Disorder: No Symptoms    Patient reports the following compulsive behaviors and treatment history: No     Diagnostic Criteria:     Generalized Anxiety Disorder  A. Excessive anxiety and worry about a number of events or activities (such as work or school performance).   B. The person finds it  difficult to control the worry.  C. Select 3 or more symptoms (required for diagnosis). Only one item is required in children.   - Restlessness or feeling keyed up or on edge.    - Being easily fatigued.    - Difficulty concentrating or mind going blank.    - Irritability.    - Muscle tension.    - Sleep disturbance (difficulty falling or staying asleep, or restless unsatisfying sleep).   D. The focus of the anxiety and worry is not confined to features of an Axis I disorder.  E. The anxiety, worry, or physical symptoms cause clinically significant distress or impairment in social, occupational, or other important areas of functioning.   F. The disturbance is not due to the direct physiological effects of a substance (e.g., a drug of abuse, a medication) or a general medical condition (e.g., hyperthyroidism) and does not occur exclusively during a Mood Disorder, a Psychotic Disorder, or a Pervasive Developmental Disorder.    - The aformentioned symptoms began 2+ year(s) ago and occurs 7 days per week and is experienced as moderate. Major Depressive Disorder  CRITERIA (A-C) REPRESENT A MAJOR DEPRESSIVE EPISODE - SELECT THESE CRITERIA  A) Recurrent episode(s) - symptoms have been present during the same 2-week period and represent a change from previous functioning 5 or more symptoms (required for diagnosis)   - Depressed mood. Note: In children and adolescents, can be irritable mood.     - Diminished interest or pleasure in all, or almost all, activities.    - Decreased sleep.    - Psychomotor activity retardation.    - Fatigue or loss of energy.    - Feelings of worthlessness or inappropriate and excessive guilt.    - Diminished ability to think or concentrate, or indecisiveness.   B) The symptoms cause clinically significant distress or impairment in social, occupational, or other important areas of functioning  C) The episode is not attributable to the physiological effects of a substance or to another medical  condition  D) The occurence of major depressive episode is not better explained by other thought / psychotic disorders  E) There has never been a manic episode or hypomanic episode       Functional Status:  Patient reports the following functional impairments:  chronic disease management, health maintenance and social interactions.     Nonprogrammatic care:  Patient is requesting basic services to address current mental health concerns.    Clinical Summary:  1. Reason for assessment:   Patient is a 46 yr old female who is self referred for Psychotherapy. Patient stated, that she has a Hx of Anxiety and Depressed mood. Patient is single although, shares her townhome with her significant other. Pt. Has a 25 yr. old son who currently resides in [place] and is a .  Patient had been working full-time up until recently when her back pain became more problematic and now is in the process of seeing specialist to remedy that situation. Current symptoms include: Little interest and pleasure in doing things, feeling down, trouble falling asleep and staying asleep, trouble concentrating, feeling nervous and anxious, worrying about different things, trouble relaxing, easily annoyed and irritable. Patient indicated that 2008 she attempted suicide and was sent to [facility] for inpatient care.  Has had no intentions or attempts since that time.  Due to a number of recent stressors she is hoping to be able to learn techniques and strategies to improve her mood.     ASSESSMENT:  2. Psychosocial, Cultural and Contextual Factors: Increased isolation  3. Principal DSM5 Diagnoses  Generalized Anxiety Disorder  Moderate episode of recurrent major depressive Disorder (H)    4. Other Diagnoses that is relevant to services:   None   5. Provisional Diagnosis: None indicated  6. Prognosis: Expect Improvement.  7. Likely consequences of symptoms if not treated:   8. Client strengths include:  empathetic, intelligent, motivated, open  to learning, open to suggestions / feedback, support of family, friends and providers, supportive, wants to learn, willing to ask questions and willing to relate to others .     Recommendations:     1. Plan for Safety and Risk Management:   Safety and Risk: Recommended that patient call 911 or go to the local ED should there be a change in any of these risk factors..          Report to child / adult protection services was NA.     2. Patient's identified Health concerns wants to be either pain free or manageable     3. Initial Treatment will focus on:    Mood stabilization              Assertiveness skill building              Stop thought processing techniques and strategies               Self-care     4. Resources/Service Plan:    services are not indicated.   Modifications to assist communication are not indicated.   Additional disability accommodations are not indicated.      5. Collaboration:   Collaboration / coordination of treatment will be initiated with the following  support professionals: None at this time     6.  Referrals:   The following referral(s) will be initiated:  Next Scheduled Appointment: In 2 weeks     A Release of Information has been obtained for the following: None indicated at this time     Emergency Contact was not obtained.     7. MARIELA:   None indicated at this time  8. Records:   These were reviewed at time of assessment.   Information in this assessment was obtained from the medical record and provided by patient who is a good historian.    Patient will have open access to their mental health medical record.        Provider Name/ Credentials: Swetha Weinberg [facility]  October 26, 2022          Answers for HPI/ROS submitted by the patient on 10/26/2022  BO 7 TOTAL SCORE: 16

## 2022-10-31 DIAGNOSIS — F51.04 PSYCHOPHYSIOLOGICAL INSOMNIA: ICD-10-CM

## 2022-10-31 DIAGNOSIS — G89.29 CHRONIC NECK PAIN: ICD-10-CM

## 2022-10-31 DIAGNOSIS — M54.2 CHRONIC NECK PAIN: ICD-10-CM

## 2022-10-31 RX ORDER — ZOLPIDEM TARTRATE 5 MG/1
TABLET ORAL
Qty: 30 TABLET | Refills: 0 | Status: SHIPPED | OUTPATIENT
Start: 2022-10-31 | End: 2022-12-01

## 2022-10-31 RX ORDER — CYCLOBENZAPRINE HCL 5 MG
TABLET ORAL
Qty: 90 TABLET | Refills: 5 | Status: SHIPPED | OUTPATIENT
Start: 2022-10-31 | End: 2023-03-01

## 2022-11-03 DIAGNOSIS — F41.9 ANXIETY: ICD-10-CM

## 2022-11-03 DIAGNOSIS — R11.0 NAUSEA: ICD-10-CM

## 2022-11-07 ENCOUNTER — HOSPITAL ENCOUNTER (EMERGENCY)
Facility: CLINIC | Age: 47
Discharge: HOME | End: 2022-11-07
Attending: PHYSICIAN ASSISTANT | Admitting: PHYSICIAN ASSISTANT
Payer: COMMERCIAL

## 2022-11-07 VITALS
RESPIRATION RATE: 18 BRPM | OXYGEN SATURATION: 99 % | BODY MASS INDEX: 29.52 KG/M2 | DIASTOLIC BLOOD PRESSURE: 85 MMHG | WEIGHT: 172 LBS | SYSTOLIC BLOOD PRESSURE: 139 MMHG | TEMPERATURE: 98 F | HEART RATE: 100 BPM

## 2022-11-07 DIAGNOSIS — T16.1XXA: ICD-10-CM

## 2022-11-07 PROCEDURE — 99284 EMERGENCY DEPT VISIT MOD MDM: CPT | Mod: 25 | Performed by: PHYSICIAN ASSISTANT

## 2022-11-07 PROCEDURE — 69200 CLEAR OUTER EAR CANAL: CPT | Mod: RT | Performed by: PHYSICIAN ASSISTANT

## 2022-11-07 RX ORDER — OFLOXACIN 3 MG/ML
5 SOLUTION AURICULAR (OTIC) 2 TIMES DAILY
Qty: 5 ML | Refills: 0 | Status: SHIPPED | OUTPATIENT
Start: 2022-11-07 | End: 2022-11-14

## 2022-11-07 RX ORDER — OXYCODONE HYDROCHLORIDE 5 MG/1
5 TABLET ORAL EVERY 6 HOURS PRN
Qty: 3 TABLET | Refills: 0 | Status: SHIPPED | OUTPATIENT
Start: 2022-11-07 | End: 2022-11-10

## 2022-11-07 NOTE — ED TRIAGE NOTES
Part of hearing aid stuck in right ear.      Triage Assessment     Row Name 11/07/22 [id number]       Triage Assessment (Adult)    Airway WDL WDL       Respiratory WDL    Respiratory WDL WDL       Cardiac WDL    Cardiac WDL WDL

## 2022-11-07 NOTE — DISCHARGE INSTRUCTIONS
It was a pleasure working with you today!  I hope your condition improves rapidly!     Thankfully, we were able to get the foreign body out of your ear.  I am concerned that you potentially could be developing a little bit of an external ear infection.  Start the drops right away.  Use a heating pad over your ear for 20 minutes every 1-2 hours over the next couple days as well.  Take ibuprofen 600 mg every 6 hours as needed for pain.  He can also use Tylenol up to 1000 mg every 6 hours as needed for pain.  Reserve the oxycodone for severe breakthrough pain.  I would not put the hearing aid in your right ear for the next 5 days.

## 2022-11-07 NOTE — ED PROVIDER NOTES
History     Chief Complaint   Patient presents with     Foreign Body in Ear     HPI  Radha Beckwith is a 46 year old female who presents for evaluation of a piece of her hearing aid being stuck in her right ear over the past 18 hours.  She has developed significant pain.  She feels like the ear is swollen.  Denies any fevers or chills.  No drainage.  She has had this happen before.  Pain is rated 5-6 on a scale of 10 and described as dull and achy.  Has not taken anything for the pain.        Allergies:  Allergies   Allergen Reactions     [facility]      12-            GI problems-     Seasonal Allergies      Sumatriptan      vomits after giving herself a shot     Compazine Anxiety     Droperidol Anxiety     Nubain [Nalbuphine Hcl] Anxiety     Prochlorperazine Palpitations     Uncontrolled movement       Problem List:    Patient Active Problem List    Diagnosis Date Noted     Moderate episode of recurrent major depressive disorder (H) 06/29/2022     Priority: Medium     Moderate major depression (H) 06/03/2019     Priority: Medium     Supraventricular tachycardia (H) 06/03/2019     Priority: Medium     Psychophysiological insomnia 12/30/2017     Priority: Medium     Chronic bilateral low back pain without sciatica 12/30/2017     Priority: Medium     Opioid dependence in remission (H) 08/02/2015     Priority: Medium     On suboxone treatement with Bryant Harkins.  (Noted in 2015).         Anxiety attack 07/31/2015     Priority: Medium     Hypokalemia 06/23/2015     Priority: Medium     Tobacco abuse 06/23/2015     Priority: Medium     Hyperlipidemia LDL goal <100 01/29/2014     Priority: Medium     Anxiety 08/19/2013     Priority: Medium     GERD (gastroesophageal reflux disease) 07/28/2010     Priority: Medium     Takes omeprazole and metoclopramide       Restless legs 07/28/2010     Priority: Medium        Past Medical History:    Past Medical History:   Diagnosis Date     Abdominal  pain, right lower quadrant 03/09/2008     Anxiety attack 07/31/2015     Atypical chest pain 06/23/2015     De Quervain's disease (tenosynovitis)      Dehydration      Gastric ulcer 07/31/2015     GERD (gastroesophageal reflux disease) 07/28/2010     Ingrowing nail 01/09/2014     Migraines      Opioid dependence in remission (H) 08/02/2015     Other and unspecified ovarian cyst      Papanicolaou smear of cervix with low grade squamous intraepithelial lesion (LGSIL) 07/07/2017       Past Surgical History:    Past Surgical History:   Procedure Laterality Date     BIOPSY CERVICAL, LOCAL EXCISION, SINGLE/MULTIPLE N/A 8/10/2017    Procedure: BIOPSY CERVICAL, LOCAL EXCISION, SINGLE/MULTIPLE;;  Surgeon: Michael Chandler MD;  Location: [facility]     COLPOSCOPY, BIOPSY, COMBINED N/A 8/10/2017    Procedure: COMBINED COLPOSCOPY, BIOPSY;  Colposcopy with Cervical Biopsies and Endometrial Biopsy, Exam with Ultrasound;  Surgeon: Michael Chandler MD;  Location: [facility]     ESOPHAGOSCOPY, GASTROSCOPY, DUODENOSCOPY (EGD), COMBINED N/A 4/17/2017    Procedure: COMBINED ESOPHAGOSCOPY, GASTROSCOPY, DUODENOSCOPY (EGD);  Surgeon: Ibrahima Esposito MD;  Location: [facility] GI     EXAM UNDER ANESTHESIA PELVIC N/A 8/10/2017    Procedure: EXAM UNDER ANESTHESIA PELVIC;;  Surgeon: Michael Chandler MD;  Location: [facility] OR     HYSTERECTOMY       HYSTERECTOMY, PAP NO LONGER INDICATED       INJECT EPIDURAL LUMBAR Bilateral 7/1/2022    Procedure: Lumbar 5-Sacral 1 Transforaminal Epidural Steroid Injection with fluoroscopic guidance and contrast, bilateral;  Surgeon: Trevor Ivory MD;  Location: [facility]     LAPAROSCOPIC CHOLECYSTECTOMY N/A 2/2/2018    Procedure: LAPAROSCOPIC CHOLECYSTECTOMY;  Laparoscopic Cholecystectomy;  Surgeon: Tigre Lowry DO;  Location: [facility]     LAPAROSCOPIC HYSTERECTOMY TOTAL N/A 10/30/2017    Procedure: LAPAROSCOPIC HYSTERECTOMY TOTAL;  LAPAROSCOPIC HYSTERECTOMY TOTAL POSSIBLE SALPINGO-OOPHERECTOMY  (BILATERAL);  Surgeon: Michael Chandler MD;  Location:  OR     [facility] UGI ENDOSCOPY, SIMPLE EXAM  01/07/08       Family History:    Family History   Problem Relation Age of Onset     Depression Mother      Respiratory Mother      Chronic Obstructive Pulmonary Disease Mother      Cerebrovascular Disease Father         Brain anyeurism     Breast Cancer Cousin      Adrenal Disorder Other      Chronic Obstructive Pulmonary Disease Other        Social History:  Marital Status:  Single [1]  Social History     Tobacco Use     Smoking status: Every Day     Packs/day: 0.25     Years: 20.00     Pack years: 5.00     Types: Cigarettes     Smokeless tobacco: Never   Vaping Use     Vaping Use: Some days     Substances: Nicotine, CBD     Devices: Gaikai   Substance Use Topics     Alcohol use: Yes     Comment: occasional drinks; about 5-6 beers/week     Drug use: No        Medications:    ofloxacin (FLOXIN) 0.3 % otic solution  oxyCODONE (ROXICODONE) 5 MG tablet  acetaminophen (TYLENOL) 500 MG tablet  ALPRAZolam (XANAX) 0.5 MG tablet  ALPRAZolam (XANAX) 1 MG tablet  buprenorphine HCl-naloxone HCl (SUBOXONE) 2-0.5 MG per film  cetirizine (ZYRTEC) 10 MG tablet  cyclobenzaprine (FLEXERIL) 5 MG tablet  DULoxetine (CYMBALTA) 30 MG capsule  famotidine (PEPCID) 40 MG tablet  lactulose (CHRONULAC) 10 GM/15ML solution  Melatonin 10 MG TABS tablet  omeprazole (PRILOSEC) 40 MG DR capsule  ondansetron (ZOFRAN ODT) 4 MG ODT tab  QUEtiapine (SEROQUEL) 200 MG tablet  rOPINIRole (REQUIP) 1 MG tablet  simvastatin (ZOCOR) 10 MG tablet  zolpidem (AMBIEN) 5 MG tablet          Review of Systems   All other systems reviewed and are negative.      Physical Exam   BP: 139/85  Pulse: 100  Temp: 98  F (36.7  C)  Resp: 18  Weight: 78 kg (172 lb)  SpO2: 99 %      Physical Exam  Vitals and nursing note reviewed.   Constitutional:       General: She is not in acute distress.     Appearance: She is not diaphoretic.   HENT:      Head:  Normocephalic and atraumatic.      Left Ear: Tympanic membrane, ear canal and external ear normal. There is no impacted cerumen.      Ears:      Comments: Right ear canal with the rubber cup from the end of her hearing aid is up against tympanic membrane.  She has tenderness with exam and there appears to be some slight swelling of the external auditory canal.  No drainage or erythema.  No bleeding.  No trismus or malocclusion.  No other abnormalities identified.     Nose: Nose normal.      Mouth/Throat:      Mouth: Oropharynx is clear and moist.      Pharynx: No oropharyngeal exudate.   Eyes:      General: No scleral icterus.        Right eye: No discharge.         Left eye: No discharge.      Extraocular Movements: EOM normal.      Conjunctiva/sclera: Conjunctivae normal.      Pupils: Pupils are equal, round, and reactive to light.   Neck:      Thyroid: No thyromegaly.   Cardiovascular:      Rate and Rhythm: Normal rate and regular rhythm.      Heart sounds: Normal heart sounds. No murmur heard.  Pulmonary:      Effort: Pulmonary effort is normal. No respiratory distress.      Breath sounds: Normal breath sounds. No wheezing or rales.   Chest:      Chest wall: No tenderness.   Abdominal:      General: Bowel sounds are normal. There is no distension.      Palpations: Abdomen is soft. There is no mass.      Tenderness: There is no abdominal tenderness. There is no guarding or rebound.   Musculoskeletal:         General: No tenderness, deformity or edema. Normal range of motion.      Cervical back: Normal range of motion and neck supple.   Lymphadenopathy:      Cervical: No cervical adenopathy.   Skin:     General: Skin is warm and dry.      Capillary Refill: Capillary refill takes less than 2 seconds.      Findings: No erythema or rash.   Neurological:      Mental Status: She is alert and oriented to person, place, and time.      Cranial Nerves: No cranial nerve deficit.   Psychiatric:         Mood and Affect: Mood  and affect normal.         Behavior: Behavior normal.         Thought Content: Thought content normal.         ED Course                 Procedures  With nursing assistance holding the otoscope and light, I was able to enter the ear canal with an alligator forceps and grasp the rubber hearing aid cup from the ear without any complications.  No drainage or bleeding.  TM was normal afterwards without evidence for perforation or other abnormality.  No redness.            Critical Care time:  none               No results found for this or any previous visit (from the past 24 hour(s)).    Medications - No data to display    Assessments & Plan (with Medical Decision Making)  Acute foreign body of right ear canal     46 year old female presents for evaluation of a piece of her hearing aid stuck in her right external auditory canal.  See procedure note above.  This was removed without complication.  Patient still describes dull aching discomfort.  She does have some minor swelling of the external auditory canal, and this foreign body has been in the ear for over 18 hours.  She certainly could be developing an external otitis complication.  We will place her on ofloxacin drops.  Recommended heat compress to the ear for 20 minutes every 1-2 hours this evening and tomorrow.  Patient felt like ibuprofen and Tylenol were not helping her pain.  She requested stronger pain medication.  I reviewed her chart, and she is on Suboxone.  Discussed this with her in detail.  She states that she does get improvement with pain medication as she reports that she is on the lowest dose of Suboxone.  She is requesting at least a few just so that she can sleep.  I agreed to provide #3 tabs of oxycodone.  The pharmacy did call later, and insurance would not allow this as she is on a controlled prescription program.  Therefore, there is nothing that I can do to help her in this regard, and a prescription could not be filled.     I have reviewed  the nursing notes.    I have reviewed the findings, diagnosis, plan and need for follow up with the patient.       Discharge Medication List as of 11/7/2022  3:49 PM      START taking these medications    Details   ofloxacin (FLOXIN) 0.3 % otic solution Place 5 drops in ear(s) 2 times daily for 7 days, Disp-5 mL, R-0, E-Prescribe      oxyCODONE (ROXICODONE) 5 MG tablet Take 1 tablet (5 mg) by mouth every 6 hours as needed for pain, Disp-3 tablet, R-0, E-Prescribe             Final diagnoses:   Acute foreign body of right ear canal     Disclaimer: This note consists of symbols derived from keyboarding, dictation and/or voice recognition software. As a result, there may be errors in the script that have gone undetected. Please consider this when interpreting information found in this chart.      11/7/2022   [facility] EMERGENCY DEPT     Gene Bryan PA-C  11/07/22 [id number]

## 2022-11-08 RX ORDER — ALPRAZOLAM 1 MG
TABLET ORAL
Qty: 30 TABLET | Refills: 0 | Status: SHIPPED | OUTPATIENT
Start: 2022-11-08 | End: 2022-11-25

## 2022-11-08 RX ORDER — ONDANSETRON 4 MG/1
TABLET, ORALLY DISINTEGRATING ORAL
Qty: 20 TABLET | Refills: 1 | Status: SHIPPED | OUTPATIENT
Start: 2022-11-08 | End: 2023-01-03

## 2022-11-08 NOTE — TELEPHONE ENCOUNTER
I reviewed the Gisselle Linn's notes last time, she is supposed to see a psychiatrist, what is the status of the psychiatry referral.  Gisselle did not want her to be on this dose in a long-term.  I refilled for 2-week supply and will let Gisselle to decide if this is a long-term treatment regimen.

## 2022-11-18 DIAGNOSIS — F41.9 ANXIETY: ICD-10-CM

## 2022-11-18 DIAGNOSIS — F33.1 MODERATE EPISODE OF RECURRENT MAJOR DEPRESSIVE DISORDER (H): Primary | ICD-10-CM

## 2022-11-18 NOTE — TELEPHONE ENCOUNTER
Placed psych referral- if she can not get in with them soon is due for follow up with me.  Gisselle Linn, CNP

## 2022-11-21 ENCOUNTER — HEALTH MAINTENANCE LETTER (OUTPATIENT)
Age: 47
End: 2022-11-21

## 2022-11-25 NOTE — TELEPHONE ENCOUNTER
Patient informed via [employer] to schedule, 11/30 reminder if not read to send letter.     Closing encounter.   Jaycee Holliday MA

## 2022-11-30 DIAGNOSIS — M54.2 CHRONIC NECK PAIN: ICD-10-CM

## 2022-11-30 DIAGNOSIS — G89.29 CHRONIC NECK PAIN: ICD-10-CM

## 2022-11-30 DIAGNOSIS — F33.1 MODERATE EPISODE OF RECURRENT MAJOR DEPRESSIVE DISORDER (H): ICD-10-CM

## 2022-11-30 DIAGNOSIS — F41.9 ANXIETY: ICD-10-CM

## 2022-12-01 DIAGNOSIS — F51.04 PSYCHOPHYSIOLOGICAL INSOMNIA: ICD-10-CM

## 2022-12-01 RX ORDER — ZOLPIDEM TARTRATE 5 MG/1
TABLET ORAL
Qty: 30 TABLET | Refills: 0 | Status: SHIPPED | OUTPATIENT
Start: 2022-12-01 | End: 2022-12-29

## 2022-12-02 RX ORDER — DULOXETIN HYDROCHLORIDE 30 MG/1
CAPSULE, DELAYED RELEASE ORAL
Qty: 180 CAPSULE | Refills: 0 | Status: SHIPPED | OUTPATIENT
Start: 2022-12-02 | End: 2023-01-10

## 2022-12-02 NOTE — TELEPHONE ENCOUNTER
Pending Prescriptions:                       Disp   Refills    DULoxetine (CYMBALTA) 30 MG capsule [Pharm*180 ca*0        Sig: TAKE ONE CAPSULE BY MOUTH TWICE A DAY      Routing refill request to provider for review/approval because:  Labs out of range:    PHQ 12/2/2021 6/27/2022 8/30/2022   PHQ-9 Total Score 13 12 16   Q9: Thoughts of better off dead/self-harm past 2 weeks Not at all Not at all Not at all   Some encounter information is confidential and restricted. Go to Review Flowsheets activity to see all data.         Frida Navas RN

## 2022-12-04 ASSESSMENT — ANXIETY QUESTIONNAIRES
GAD7 TOTAL SCORE: 19
1. FEELING NERVOUS, ANXIOUS, OR ON EDGE: NEARLY EVERY DAY
GAD7 TOTAL SCORE: 19
6. BECOMING EASILY ANNOYED OR IRRITABLE: NEARLY EVERY DAY
GAD7 TOTAL SCORE: 19
7. FEELING AFRAID AS IF SOMETHING AWFUL MIGHT HAPPEN: NEARLY EVERY DAY
IF YOU CHECKED OFF ANY PROBLEMS ON THIS QUESTIONNAIRE, HOW DIFFICULT HAVE THESE PROBLEMS MADE IT FOR YOU TO DO YOUR WORK, TAKE CARE OF THINGS AT HOME, OR GET ALONG WITH OTHER PEOPLE: EXTREMELY DIFFICULT
7. FEELING AFRAID AS IF SOMETHING AWFUL MIGHT HAPPEN: NEARLY EVERY DAY
4. TROUBLE RELAXING: NEARLY EVERY DAY
8. IF YOU CHECKED OFF ANY PROBLEMS, HOW DIFFICULT HAVE THESE MADE IT FOR YOU TO DO YOUR WORK, TAKE CARE OF THINGS AT HOME, OR GET ALONG WITH OTHER PEOPLE?: EXTREMELY DIFFICULT
5. BEING SO RESTLESS THAT IT IS HARD TO SIT STILL: SEVERAL DAYS
2. NOT BEING ABLE TO STOP OR CONTROL WORRYING: NEARLY EVERY DAY
3. WORRYING TOO MUCH ABOUT DIFFERENT THINGS: NEARLY EVERY DAY

## 2022-12-05 ENCOUNTER — VIRTUAL VISIT (OUTPATIENT)
Dept: BEHAVIORAL HEALTH | Facility: CLINIC | Age: 47
End: 2022-12-05
Payer: COMMERCIAL

## 2022-12-05 DIAGNOSIS — F41.1 GAD (GENERALIZED ANXIETY DISORDER): Primary | ICD-10-CM

## 2022-12-05 PROCEDURE — 90834 PSYTX W PT 45 MINUTES: CPT | Mod: 95 | Performed by: SOCIAL WORKER

## 2022-12-05 NOTE — PROGRESS NOTES
[facility]                                     Progress Note    Patient Name: Radha Beckwith  Date:  12/5/22         Service Type: Individual      Session Start Time: 1100  Session End Time: 1152     Session Length: 52    Session #: 3    Attendees: Client    Service Modality:  Video Visit:      Provider verified identity through the following two step process.  Patient provided:  Patient is known previously to provider    Telemedicine Visit: The patient's condition can be safely assessed and treated via synchronous audio and visual telemedicine encounter.      Reason for Telemedicine Visit: Patient has requested telehealth visit    Originating Site (Patient Location): Patient's home    Distant Site (Provider Location): Provider Remote Setting- Home Office    Consent:  The patient/guardian has verbally consented to: the potential risks and benefits of telemedicine (video visit) versus in person care; bill my insurance or make self-payment for services provided; and responsibility for payment of non-covered services.     Patient would like the video invitation sent by:  Send to e-mail at: [email or web address]    Mode of Communication:  [facility]    Distant Location (Provider):  Off-site    As the provider I attest to compliance with applicable laws and regulations related to telemedicine.    DATA  Interactive Complexity: No  Crisis: No        Progress Since Last Session (Related to Symptoms / Goals / Homework):   Symptoms: Some improvement     Homework: Achieved / completed to satisfaction      Episode of Care Goals: Minimal progress - ACTION (Actively working towards change); Intervened by reinforcing change plan / affirming steps taken     Current / Ongoing Stressors and Concerns:     To deal with medical issues which are frustrating for her.      Treatment Objective(s) Addressed in This Session:   During today's session discussed the loss of her mother (ffamily of  origin issues.)  She took care of her when she was ill.  In regard to her personal health she indicated she is learning to be a better advocate for herself which is helping her mood improve         Intervention:   CBT, solution focused therapy    Assessments completed prior to visit:  The following assessments were completed by patient for this visit:  PHQ9:   PHQ-9 SCORE 2/23/2021 7/9/2021 12/2/2021 6/27/2022 8/30/2022 10/4/2022 11/8/2022   PHQ-9 Total Score MyChart 11 (Moderate depression) 18 (Moderately severe depression) 13 (Moderate depression) 12 (Moderate depression) 16 (Moderately severe depression) 18 (Moderately severe depression) 18 (Moderately severe depression)   PHQ-9 Total Score 11 18 13 12 16 18 18     GAD7:   BO-7 SCORE 9/12/2020 7/9/2021 12/2/2021 6/27/2022 8/30/2022 10/26/2022 12/4/2022   Total Score 17 (severe anxiety) 11 (moderate anxiety) 17 (severe anxiety) 12 (moderate anxiety) 16 (severe anxiety) 16 (severe anxiety) 19 (severe anxiety)   Total Score 17 11 17 12 16 16 19     PROMIS 10-Global Health (all questions and answers displayed):   PROMIS 10 10/26/2022   In general, would you say your health is: Fair   In general, would you say your quality of life is: Poor   In general, how would you rate your physical health? Poor   In general, how would you rate your mental health, including your mood and your ability to think? Fair   In general, how would you rate your satisfaction with your social activities and relationships? Poor   In general, please rate how well you carry out your usual social activities and roles Poor   To what extent are you able to carry out your everyday physical activities such as walking, climbing stairs, carrying groceries, or moving a chair? A little   How often have you been bothered by emotional problems such as feeling anxious, depressed or irritable? Always   How would you rate your fatigue on average? Moderate   How would you rate your pain on average?   0 = No  Pain  to  10 = Worst Imaginable Pain 8   In general, would you say your health is: 2   In general, would you say your quality of life is: 1   In general, how would you rate your physical health? 1   In general, how would you rate your mental health, including your mood and your ability to think? 2   In general, how would you rate your satisfaction with your social activities and relationships? 1   In general, please rate how well you carry out your usual social activities and roles. (This includes activities at home, at work and in your community, and responsibilities as a parent, child, spouse, employee, friend, etc.) 1   To what extent are you able to carry out your everyday physical activities such as walking, climbing stairs, carrying groceries, or moving a chair? 2   In the past 7 days, how often have you been bothered by emotional problems such as feeling anxious, depressed, or irritable? 5   In the past 7 days, how would you rate your fatigue on average? 3   In the past 7 days, how would you rate your pain on average, where 0 means no pain, and 10 means worst imaginable pain? 8   Global Mental Health Score 5   Global Physical Health Score 8   PROMIS TOTAL - SUBSCORES 13   Some recent data might be hidden         ASSESSMENT: Current Emotional / Mental Status (status of significant symptoms):   Risk status (Self / Other harm or suicidal ideation)   Patient denies current fears or concerns for personal safety.   Patient denies current or recent suicidal ideation or behaviors.   Patient denies current or recent homicidal ideation or behaviors.   Patient denies current or recent self injurious behavior or ideation.   Patient denies other safety concerns.   Patient reports there has been no change in risk factors since their last session.     Patient reports there has been no change in protective factors since their last session.     Recommended that patient call 911 or go to the local ED should there be a change  in any of these risk factors.     Appearance:   Appropriate    Eye Contact:   Good    Psychomotor Behavior: Normal    Attitude:   Cooperative    Orientation:   All   Speech    Rate / Production: Normal     Volume:  Normal    Mood:    Normal   Affect:    Appropriate    Thought Content:  Clear    Thought Form:  Coherent  Logical    Insight:    Good      Medication Review:   Changes to psychiatric medications, see updated Medication List in EPIC.        Medication Compliance:   Yes     Changes in Health Issues:   Continued pain issues. Problems with haring aid      Chemical Use Review:   Substance Use: Chemical use reviewed, no active concerns identified      Tobacco Use: No current tobacco use.      Diagnosis:  Generalized anxiety disorder    Collateral Reports Completed:   Routed note to PCP    PLAN: (Patient Tasks / Therapist Tasks / Other)  1. Continued self care strategies.  2. Continue current medication regime.   3. Stop thought processing techniques.  4. Pt. will speak will her Md. regarding [facility]  12/5/22                                                         ______________________________________________________________________    Individual Treatment Plan    Patient's Name: Radha Beckwith  YOB: 1975    Date of Creation: 12/5/22  Date Treatment Plan Last Reviewed/Revised:     DSM5 Diagnoses: Generalized anxiety disorder  Psychosocial / Contextual Factors: Current stressors with holiday season coming up and medical concerns.  PROMIS (reviewed every 90 days):     Referral / Collaboration:  Referral to another professional/service is not indicated at this time..    Anticipated number of session for this episode of care: 6-9 sessions  Anticipation frequency of session: Biweekly  Anticipated Duration of each session: 38-52 minutes  Treatment plan will be reviewed in 90 days or when goals have been changed.       MeasurableTreatment Goal(s) related to diagnosis /  functional impairment(s)  MeasurableTreatment Goal(s) related to diagnosis / functional impairment(s)  Goal 1: Patient will focus on recognizing and managing symptoms of anxiety as evidenced by a BO-7 score of 5 or less    I will know I've met my goal when I do not have to call and reach out for extra support from the clinic.       Objective #A (Patient Action)                          Patient will practice deep breathing at least 3-5 a day.  Status: New - Date 12/5/22     Objective #B  Patient will attend and participate in social or recreational activities At least 3 times per week.  Status: New -  Date 12/5/22       Objective #C  Patient will use at least 5 coping skills for anxiety management in the next 12 weeks.  Status: New  - Date 12/5/22       Objective #D                Patient will use thought-stopping strategy daily to reduce intrusive thoughts.  Status: New - Date: 12/5/22     Objective #E  Patient will use distraction each time intrusive worry surfaces.                       Status: New - Date: 12/5/22     Objective #F  Patient will Improve quantity and quality of night time sleep / decrease daytime naps.  Status: New - Date: New 12/5/22      Intervention(s)  Therapist will teach CBT skills to challenge cognitive distortions and core beliefs.  Therapist will teach and model positive self-talk behaviors.  Therapist will use psychodynamic approaches to explore early attachments and schemas.  Therapist will teach DBT mindfulness and emotion regulation skills.            Patient has reviewed and agreed to the above plan.    Swetha Weinberg [facility]  December 5, 2022    Answers for HPI/ROS submitted by the patient on 12/4/2022  BO 7 TOTAL SCORE: 19

## 2022-12-09 ENCOUNTER — TELEPHONE (OUTPATIENT)
Dept: FAMILY MEDICINE | Facility: CLINIC | Age: 47
End: 2022-12-09

## 2022-12-09 DIAGNOSIS — F41.9 ANXIETY: ICD-10-CM

## 2022-12-09 NOTE — TELEPHONE ENCOUNTER
Reason for Call:  Other call back    Detailed comments: pt has apt on 1/2/2023 and will be out of meds. Could she get a refill to get her to her apt    Please send to pharmacy    Please call and advise pt     Pt also wanted me to let Gisselle Linn know that she has be seen for mental health 2 times now       Phone Number Patient can be reached at: Cell number on file:    Telephone Information:   Mobile [phone]       Best Time: anytime    Can we leave a detailed message on this number? YES    Call taken on 12/9/2022 at 11:42 AM by Paras Vizcaino

## 2022-12-12 DIAGNOSIS — F41.9 ANXIETY: ICD-10-CM

## 2022-12-12 NOTE — TELEPHONE ENCOUNTER
As of 10/5/22 patient was always given 60 tablets for the month when Gisselle was out Dr Parker only okayed 30 tablets on 11/8  then Gisselle okayed another 30 on 11/25 so patient is out and would need a refill if she is to have 60 a month has this changed to 30 a month and patient is unaware?

## 2022-12-13 RX ORDER — ALPRAZOLAM 1 MG
1 TABLET ORAL 2 TIMES DAILY
Qty: 60 TABLET | Refills: 0 | Status: SHIPPED | OUTPATIENT
Start: 2022-12-13 | End: 2023-01-10

## 2022-12-19 RX ORDER — ALPRAZOLAM 1 MG
TABLET ORAL
Qty: 30 TABLET | Refills: 0 | OUTPATIENT
Start: 2022-12-19

## 2022-12-22 ENCOUNTER — VIRTUAL VISIT (OUTPATIENT)
Dept: BEHAVIORAL HEALTH | Facility: CLINIC | Age: 47
End: 2022-12-22
Payer: COMMERCIAL

## 2022-12-22 DIAGNOSIS — F41.1 GAD (GENERALIZED ANXIETY DISORDER): Primary | ICD-10-CM

## 2022-12-22 PROCEDURE — 90834 PSYTX W PT 45 MINUTES: CPT | Mod: 95 | Performed by: SOCIAL WORKER

## 2022-12-22 NOTE — PROGRESS NOTES
[facility]                                     Progress Note    Patient Name: Radha Beckwith  Date: 12/22/22         Service Type: Individual      Session Start Time: 1300  Session End Time: 1352     Session Length:52    Session #: 4    Attendees: Client    Service Modality:  Video Visit:      Provider verified identity through the following two step process.  Patient provided:  Patient is known previously to provider    Telemedicine Visit: The patient's condition can be safely assessed and treated via synchronous audio and visual telemedicine encounter.      Reason for Telemedicine Visit: Patient has requested telehealth visit    Originating Site (Patient Location): Patient's home    Distant Site (Provider Location): Provider Remote Setting- Home Office    Consent:  The patient/guardian has verbally consented to: the potential risks and benefits of telemedicine (video visit) versus in person care; bill my insurance or make self-payment for services provided; and responsibility for payment of non-covered services.     Patient would like the video invitation sent by:  Send to e-mail at: [email or web address]    Mode of Communication:  Video Conference via [facility]    Distant Location (Provider):  Off-site    As the provider I attest to compliance with applicable laws and regulations related to telemedicine.    DATA  Interactive Complexity: No  Crisis: No        Progress Since Last Session (Related to Symptoms / Goals / Homework):   Symptoms: Mood is improving. Looking forward to the upcoming holiday.    Homework: Completed in session      Episode of Care Goals: Satisfactory progress - ACTION (Actively working towards change); Intervened by reinforcing change plan / affirming steps taken     Current / Ongoing Stressors and Concerns:   Continued medical issues. Some mood irregularities after looking back             as to how she was treated by her mother.     Treatment Objective(s) Addressed  "in This Session:   Patient was thinking back over her childhood and years as a young adult. Pt. Stated, \"I feel like a child \" for example when there are things I can't do.  Discussing the difference between thoughts and feelings.                  Intervention:    CBT, solution focused therapy    Assessments completed prior to visit:  The following assessments were completed by patient for this visit:  PHQ9:   PHQ-9 SCORE 7/9/2021 12/2/2021 6/27/2022 8/30/2022 10/4/2022 11/8/2022 12/22/2022   PHQ-9 Total Score MyChart 18 (Moderately severe depression) 13 (Moderate depression) 12 (Moderate depression) 16 (Moderately severe depression) 18 (Moderately severe depression) 18 (Moderately severe depression) 18 (Moderately severe depression)   PHQ-9 Total Score 18 13 12 16 18 18 18     GAD7:   BO-7 SCORE 9/12/2020 7/9/2021 12/2/2021 6/27/2022 8/30/2022 10/26/2022 12/4/2022   Total Score 17 (severe anxiety) 11 (moderate anxiety) 17 (severe anxiety) 12 (moderate anxiety) 16 (severe anxiety) 16 (severe anxiety) 19 (severe anxiety)   Total Score 17 11 17 12 16 16 19     PROMIS 10-Global Health (all questions and answers displayed):   PROMIS 10 10/26/2022   In general, would you say your health is: Fair   In general, would you say your quality of life is: Poor   In general, how would you rate your physical health? Poor   In general, how would you rate your mental health, including your mood and your ability to think? Fair   In general, how would you rate your satisfaction with your social activities and relationships? Poor   In general, please rate how well you carry out your usual social activities and roles Poor   To what extent are you able to carry out your everyday physical activities such as walking, climbing stairs, carrying groceries, or moving a chair? A little   How often have you been bothered by emotional problems such as feeling anxious, depressed or irritable? Always   How would you rate your fatigue on average? " Moderate   How would you rate your pain on average?   0 = No Pain  to  10 = Worst Imaginable Pain 8   In general, would you say your health is: 2   In general, would you say your quality of life is: 1   In general, how would you rate your physical health? 1   In general, how would you rate your mental health, including your mood and your ability to think? 2   In general, how would you rate your satisfaction with your social activities and relationships? 1   In general, please rate how well you carry out your usual social activities and roles. (This includes activities at home, at work and in your community, and responsibilities as a parent, child, spouse, employee, friend, etc.) 1   To what extent are you able to carry out your everyday physical activities such as walking, climbing stairs, carrying groceries, or moving a chair? 2   In the past 7 days, how often have you been bothered by emotional problems such as feeling anxious, depressed, or irritable? 5   In the past 7 days, how would you rate your fatigue on average? 3   In the past 7 days, how would you rate your pain on average, where 0 means no pain, and 10 means worst imaginable pain? 8   Global Mental Health Score 5   Global Physical Health Score 8   PROMIS TOTAL - SUBSCORES 13   Some recent data might be hidden         ASSESSMENT: Current Emotional / Mental Status (status of significant symptoms):   Risk status (Self / Other harm or suicidal ideation)   Patient denies current fears or concerns for personal safety.   Patient denies current or recent suicidal ideation or behaviors.   Patient denies current or recent homicidal ideation or behaviors.   Patient denies current or recent self injurious behavior or ideation.   Patient denies other safety concerns.   Patient reports there has been no change in risk factors since their last session.     Patient reports there has been no change in protective factors since their last session.     Recommended that  patient call 911 or go to the local ED should there be a change in any of these risk factors.     Appearance:   Appropriate    Eye Contact:   Good    Psychomotor Behavior: Normal    Attitude:   Cooperative    Orientation:   All   Speech    Rate / Production: Normal     Volume:  Normal    Mood:    Normal   Affect:    Appropriate    Thought Content:  Clear    Thought Form:  Coherent  Logical    Insight:    Good      Medication Review:   No current psychiatric medications prescribed     Medication Compliance:   Yes     Changes in Health Issues:   None reported     Chemical Use Review:   Substance Use: Chemical use reviewed, no active concerns identified      Tobacco Use: No current tobacco use.      Diagnosis:  Generalized Anxiety Disorder    Collateral Reports Completed:   Routed note to PCP    PLAN: (Patient Tasks / Therapist Tasks / Other)    1. Continued self care strategies.  2. Continue current medication regime.   3. Stop thought processing techniques.  4. Pt. will speak will her Md. regarding [facility]  12/22/22       _________________________________________________________________                                                     Individual Treatment Plan     Patient's Name: Radha Beckwith                    YOB: 1975     Date of Creation: 12/5/22  Date Treatment Plan Last Reviewed/Revised:      DSM5 Diagnoses: Generalized anxiety disorder  Psychosocial / Contextual Factors: Current stressors with holiday season coming up and medical concerns.  PROMIS (reviewed every 90 days):      Referral / Collaboration:  Referral to another professional/service is not indicated at this time..     Anticipated number of session for this episode of care: 6-9 sessions  Anticipation frequency of session: Biweekly  Anticipated Duration of each session: 38-52 minutes  Treatment plan will be reviewed in 90 days or when goals have been changed.         MeasurableTreatment Goal(s)  related to diagnosis / functional impairment(s)  MeasurableTreatment Goal(s) related to diagnosis / functional impairment(s)  Goal 1: Patient will focus on recognizing and managing symptoms of anxiety as evidenced by a BO-7 score of 5 or less    I will know I've met my goal when I do not have to call and reach out for extra support from the clinic.       Objective #A (Patient Action)                          Patient will practice deep breathing at least 3-5 a day.  Status: New - Date 12/5/22     Objective #B  Patient will attend and participate in social or recreational activities At least 3 times per week.  Status: New -  Date 12/5/22        Objective #C  Patient will use at least 5 coping skills for anxiety management in the next 12 weeks.  Status: New  - Date 12/5/22        Objective #D                Patient will use thought-stopping strategy daily to reduce intrusive thoughts.  Status: New - Date: 12/5/22     Objective #E  Patient will use distraction each time intrusive worry surfaces.                       Status: New - Date: 12/5/22     Objective #F  Patient will Improve quantity and quality of night time sleep / decrease daytime naps.  Status: New - Date: New 12/5/22      Intervention(s)  Therapist will teach CBT skills to challenge cognitive distortions and core beliefs.  Therapist will teach and model positive self-talk behaviors.  Therapist will use psychodynamic approaches to explore early attachments and schemas.  Therapist will teach DBT mindfulness and emotion regulation skills.                Patient has reviewed and agreed to the above plan.     Swetha Weinberg [facility]                December 5, 2022     Answers for HPI/ROS submitted by the patient on 12/4/2022  BO 7 TOTAL SCORE: 19     Answers for HPI/ROS submitted by the patient on 12/22/2022  If you checked off any problems, how difficult have these problems made it for you to do your work, take care of things at home, or get along with other  people?: Extremely difficult  PHQ9 TOTAL SCORE: 18

## 2022-12-28 DIAGNOSIS — G25.81 RESTLESS LEGS SYNDROME (RLS): ICD-10-CM

## 2022-12-28 DIAGNOSIS — F51.04 PSYCHOPHYSIOLOGICAL INSOMNIA: ICD-10-CM

## 2022-12-29 ENCOUNTER — OFFICE VISIT (OUTPATIENT)
Dept: AUDIOLOGY | Facility: CLINIC | Age: 47
End: 2022-12-29
Payer: COMMERCIAL

## 2022-12-29 DIAGNOSIS — H90.3 BILATERAL SENSORINEURAL HEARING LOSS: Primary | ICD-10-CM

## 2022-12-29 PROCEDURE — 99207 PR NO CHARGE LOS: CPT | Performed by: AUDIOLOGIST

## 2022-12-29 PROCEDURE — V5010 ASSESSMENT FOR HEARING AID: HCPCS | Performed by: AUDIOLOGIST

## 2022-12-29 RX ORDER — ZOLPIDEM TARTRATE 5 MG/1
TABLET ORAL
Qty: 30 TABLET | Refills: 0 | Status: SHIPPED | OUTPATIENT
Start: 2022-12-31 | End: 2023-02-02

## 2022-12-29 RX ORDER — ROPINIROLE 1 MG/1
TABLET, FILM COATED ORAL
Qty: 90 TABLET | Refills: 0 | Status: SHIPPED | OUTPATIENT
Start: 2022-12-29 | End: 2023-03-14

## 2022-12-29 NOTE — PROGRESS NOTES
AUDIOLOGY REPORT     SUBJECTIVE:  Radha Beckwith is a 47 year old female who was seen in the Audiology Clinic at the [facility] on 12/29/2022  for a follow-up check regarding the fitting of new hearing aids. Previous results have revealed normal sloping to moderate sensorineural hearing loss bilaterally.  The patient was fit with binaural Signia Pure Charge & Go 5 AX RICs on 7/28/2022.  Radha reports the left device was lost out in the community (likely while taking off her mask). She also noted the right dome got stuck in her ear without her knowing and she tried to remove like it was ear wax.       OBJECTIVE:   Otoscopic exam showed clear canal right and no current sign of irritation. Cleaned the hearing aid replaced the wax filter. Discussed cleaning and examining the dome to ensure it is secure. Reviewed steps if she is concerned the dome is in her ear.     No charge visit today (in warranty hearing aid check).     ASSESSMENT:   A follow-up appointment for hearing aid fitting was completed today. Changes to hearing aid was completed as outlined above.      PLAN:  Discussed custom canal earmolds for improved retention. Call for programming when replacement hearing aid is received from [employer]. Schedule follow up after programming to ensure the domes are staying in place, are secure, and that she is connected to her phone. Please call this clinic with any questions regarding today s appointment.     Vivienne Rodriges.  [place] Licensed Audiologist #[id number]

## 2022-12-29 NOTE — TELEPHONE ENCOUNTER
"Requested Prescriptions   Pending Prescriptions Disp Refills    rOPINIRole (REQUIP) 1 MG tablet [Pharmacy Med Name: ROPINIROLE HCL 1MG TABS] 90 tablet 0     Sig: TAKE ONE TABLET BY MOUTH EVERY NIGHT AT BEDTIME       Antiparkinson's Agents Protocol Failed - 12/28/2022 10:26 AM        Failed - CBC on record in past 12 months     Recent Labs   Lab Test 07/06/21  1153   WBC 10.2   RBC 4.44   HGB 13.6   HCT 40.7                    Failed - ALT on record in past 12 months         Recent Labs   Lab Test 07/06/21  1153   ALT 34             Passed - Blood pressure under 140/90 in past 12 months     BP Readings from Last 3 Encounters:   11/07/22 139/85   07/12/22 138/81   07/01/22 107/71                 Passed - Serum Creatinine on file in past 12 months     Recent Labs   Lab Test 06/29/22  0958   CR 0.68       Ok to refill medication if creatinine is low          Passed - Medication is active on med list        Passed - Patient is age 18 or older        Passed - No active pregnancy on record        Passed - No positive pregnancy test in the past 12 months        Passed - Recent (6 mo) or future (30 days) visit within the authorizing provider's specialty     Patient had office visit in the last 6 months or has a visit in the next 30 days with authorizing provider or within the authorizing provider's specialty.  See \"Patient Info\" tab in inbasket, or \"Choose Columns\" in Meds & Orders section of the refill encounter.                 "

## 2022-12-30 DIAGNOSIS — F41.9 ANXIETY: ICD-10-CM

## 2022-12-30 DIAGNOSIS — R11.0 NAUSEA: ICD-10-CM

## 2023-01-02 RX ORDER — VIT C/B6/B5/MAGNESIUM/HERB 173 50-5-6-5MG
1 CAPSULE ORAL DAILY
COMMUNITY
End: 2023-05-23

## 2023-01-03 ENCOUNTER — LAB (OUTPATIENT)
Dept: LAB | Facility: CLINIC | Age: 48
End: 2023-01-03
Payer: COMMERCIAL

## 2023-01-03 DIAGNOSIS — F11.21 OPIOID DEPENDENCE IN REMISSION (H): ICD-10-CM

## 2023-01-03 LAB — CANNABINOIDS UR QL SCN: NORMAL

## 2023-01-03 PROCEDURE — 80307 DRUG TEST PRSMV CHEM ANLYZR: CPT

## 2023-01-03 RX ORDER — ONDANSETRON 4 MG/1
TABLET, ORALLY DISINTEGRATING ORAL
Qty: 20 TABLET | Refills: 0 | Status: SHIPPED | OUTPATIENT
Start: 2023-01-03 | End: 2023-01-10

## 2023-01-04 LAB — CREAT UR-MCNC: 26 MG/DL

## 2023-01-05 LAB
A-OH ALPRAZ UR QL CFM: PRESENT
ALPRAZ UR QL CFM: PRESENT
BUPRENORPHINE UR CFM-MCNC: 10 NG/ML
BUPRENORPHINE/CREAT UR: 38 NG/MG {CREAT}
NALOXONE UR CFM-MCNC: 11 NG/ML
NALOXONE: 42 NG/MG {CREAT}
NORBUPRENORPHINE/CREAT UR: ABNORMAL

## 2023-01-05 NOTE — H&P
[facility] History and Physical    Radha Beckwith MRN# [id number]   Age: 47 year old YOB: 1975     Date of Admission:  (Not on file)    Home clinic: [facility]  Primary care provider: Gisselle Linn          Impression and Plan:   Impression:   Screen for colon cancer [Z12.11]  GERD sx for many years despite PPI  Last EGD 2017-normal      Plan:   Proceed to EGD and Colonoscopy as planned.  The procedure, risks(bleeding, perforation), benefits and alternatives were discussed and the patient agrees to proceed. Cleared for Anesthesia             Chief Complaint:   Screen for colon cancer [Z12.11]    History is obtained from the patient          History of Present Illness:   This 47 year old female is being seen at this time for evaluation EGD and colonoscopy.  No family hx of colon ca.  No complaints.  Hx of GERD sx despite PPI           Past Medical History:     Past Medical History:   Diagnosis Date     Abdominal pain, right lower quadrant 03/09/2008    Admit. Discharged 03/10/08     Anxiety attack 07/31/2015     Atypical chest pain 06/23/2015     De Quervain's disease (tenosynovitis)      Dehydration      Gastric ulcer 07/31/2015     GERD (gastroesophageal reflux disease) 07/28/2010    Takes omeprazole and metoclopramide      Ingrowing nail 01/09/2014     Migraines      Opioid dependence in remission (H) 08/02/2015     Other and unspecified ovarian cyst      Papanicolaou smear of cervix with low grade squamous intraepithelial lesion (LGSIL) 07/07/2017            Past Surgical History:     Past Surgical History:   Procedure Laterality Date     BIOPSY CERVICAL, LOCAL EXCISION, SINGLE/MULTIPLE N/A 8/10/2017    Procedure: BIOPSY CERVICAL, LOCAL EXCISION, SINGLE/MULTIPLE;;  Surgeon: Michael Chandler MD;  Location: [facility]     COLPOSCOPY, BIOPSY, COMBINED N/A 8/10/2017    Procedure: COMBINED COLPOSCOPY, BIOPSY;  Colposcopy with Cervical Biopsies and  Endometrial Biopsy, Exam with Ultrasound;  Surgeon: Michael Chandler MD;  Location: [facility]     ESOPHAGOSCOPY, GASTROSCOPY, DUODENOSCOPY (EGD), COMBINED N/A 4/17/2017    Procedure: COMBINED ESOPHAGOSCOPY, GASTROSCOPY, DUODENOSCOPY (EGD);  Surgeon: Ibrahima Esposito MD;  Location: [facility]     EXAM UNDER ANESTHESIA PELVIC N/A 8/10/2017    Procedure: EXAM UNDER ANESTHESIA PELVIC;;  Surgeon: Michael Chandler MD;  Location: [facility]     HYSTERECTOMY       HYSTERECTOMY, PAP NO LONGER INDICATED       INJECT EPIDURAL LUMBAR Bilateral 7/1/2022    Procedure: Lumbar 5-Sacral 1 Transforaminal Epidural Steroid Injection with fluoroscopic guidance and contrast, bilateral;  Surgeon: Trevor Ivory MD;  Location: [facility]     LAPAROSCOPIC CHOLECYSTECTOMY N/A 2/2/2018    Procedure: LAPAROSCOPIC CHOLECYSTECTOMY;  Laparoscopic Cholecystectomy;  Surgeon: Tigre Lowry DO;  Location: [facility]     LAPAROSCOPIC HYSTERECTOMY TOTAL N/A 10/30/2017    Procedure: LAPAROSCOPIC HYSTERECTOMY TOTAL;  LAPAROSCOPIC HYSTERECTOMY TOTAL POSSIBLE SALPINGO-OOPHERECTOMY (BILATERAL);  Surgeon: Michael Chandler MD;  Location: [facility] OR     ZZHC UGI ENDOSCOPY, SIMPLE EXAM  01/07/08            Social History:     Social History     Tobacco Use     Smoking status: Every Day     Packs/day: 0.25     Years: 20.00     Pack years: 5.00     Types: Cigarettes     Smokeless tobacco: Never   Substance Use Topics     Alcohol use: Yes     Comment: occasional drinks; about 5-6 beers/week            Family History:     Family History   Problem Relation Age of Onset     Depression Mother      Respiratory Mother      Chronic Obstructive Pulmonary Disease Mother      Cerebrovascular Disease Father         Brain anyeurism     Breast Cancer Cousin      Adrenal Disorder Other      Chronic Obstructive Pulmonary Disease Other             Immunizations:     VACCINE/DOSE   Diptheria   DPT   DTAP   HBIG   Hepatitis A   Hepatitis B   HIB   Influenza   Measles    Meningococcal   MMR   Mumps   Pneumococcal   Polio   Rubella   Small Pox   TDAP   Varicella   Zoster            Allergies:     Allergies   Allergen Reactions     Cafergot      12-            GI problems-     Seasonal Allergies      Sumatriptan      vomits after giving herself a shot     Compazine Anxiety     Droperidol Anxiety     Nubain [Nalbuphine Hcl] Anxiety     Prochlorperazine Palpitations     Uncontrolled movement            Medications:     No current facility-administered medications for this encounter.     Current Outpatient Medications   Medication Sig     Turmeric (QC TUMERIC COMPLEX) 500 MG CAPS      acetaminophen (TYLENOL) 500 MG tablet Take 1,000 mg by mouth every 6 hours as needed for mild pain     ALPRAZolam (XANAX) 1 MG tablet Take 1 tablet (1 mg) by mouth 2 times daily     bisacodyl (DULCOLAX) 5 MG EC tablet 2 days prior to procedure, take 2 tablets at 4 pm. 1 day prior to procedure, take 2 tablets at 4 pm. For additional instructions refer to your colonoscopy prep instructions.     buprenorphine HCl-naloxone HCl (SUBOXONE) 2-0.5 MG per film Place 0.5 Film under the tongue daily      cetirizine (ZYRTEC) 10 MG tablet TAKE 1 TABLET (10 MG) BY MOUTH EVERY MORNING     cyclobenzaprine (FLEXERIL) 5 MG tablet TAKE ONE TO TWO TABLETS BY MOUTH THREE TIMES A DAY AS NEEDED FOR MUSCLE SPASMS     DULoxetine (CYMBALTA) 30 MG capsule TAKE ONE CAPSULE BY MOUTH TWICE A DAY     famotidine (PEPCID) 40 MG tablet Take 1 tablet (40 mg) by mouth daily     lactulose (CHRONULAC) 10 GM/15ML solution Take 10 g by mouth every 6 hours as needed for constipation     Melatonin 10 MG TABS tablet Take 10 mg by mouth nightly as needed for sleep     omeprazole (PRILOSEC) 40 MG DR capsule TAKE ONE CAPSULE BY MOUTH ONCE DAILY (Patient taking differently: Take 40 mg by mouth daily)     ondansetron (ZOFRAN ODT) 4 MG ODT tab DISSOLVE 1 TABLET ON TONGUE EVERY 6 HOURS AS NEEDED FOR NAUSEA     polyethylene glycol (GOLYTELY) 236 g  suspension 2 days prior at 5pm, mix and drink half of a jug of Golytely. Drink an 8 oz. glass of Golytely every 15 minutes until half of the jug is gone. Place remainder of Golytely in the refrigerator. 1 day prior at 5 pm, drink the 2nd half of a jug of Golytely bowel prep. 6 hours before your check-in time, drink an 8 oz. glass of Golytely every 15 minutes until half of the 2nd jug of Golytely is gone. Discard remainder of second jug.     QUEtiapine (SEROQUEL) 200 MG tablet TAKE 1 TABLET (200 MG) BY MOUTH AT BEDTIME     rOPINIRole (REQUIP) 1 MG tablet TAKE ONE TABLET BY MOUTH EVERY NIGHT AT BEDTIME     simvastatin (ZOCOR) 10 MG tablet TAKE ONE TABLET BY MOUTH EVERY NIGHT AT BEDTIME     zolpidem (AMBIEN) 5 MG tablet TAKE ONE TABLET (5 MG) BY MOUTH NIGHTLY AS NEEDED FOR SLEEP             Review of Systems:   The review of systems was positive for the following findings.  None.  The remainder of the review of systems was unremarkable.          Physical Exam:   All vitals have been reviewed  not currently breastfeeding.  No intake or output data in the 24 hours ending 01/05/23 0809  SHEENT examination revealed NC/at, EOMI.  Examination of the chest revealed CTA.  Examination of the heart revealed RRR.  Examination of the abdomen revealed soft, non tender.  The neuromuscular examination was NL.          Data:   All laboratory data reviewed  No results found for any visits on 01/06/23.  -\     Michael Dozier MD, FACS

## 2023-01-06 ENCOUNTER — ANESTHESIA EVENT (OUTPATIENT)
Dept: GASTROENTEROLOGY | Facility: CLINIC | Age: 48
End: 2023-01-06
Payer: COMMERCIAL

## 2023-01-06 ENCOUNTER — ANESTHESIA (OUTPATIENT)
Dept: GASTROENTEROLOGY | Facility: CLINIC | Age: 48
End: 2023-01-06
Payer: COMMERCIAL

## 2023-01-06 ENCOUNTER — HOSPITAL ENCOUNTER (OUTPATIENT)
Facility: CLINIC | Age: 48
Discharge: HOME | End: 2023-01-06
Attending: SPECIALIST | Admitting: SPECIALIST
Payer: COMMERCIAL

## 2023-01-06 VITALS
RESPIRATION RATE: 16 BRPM | OXYGEN SATURATION: 97 % | DIASTOLIC BLOOD PRESSURE: 81 MMHG | HEART RATE: 85 BPM | TEMPERATURE: 98.7 F | BODY MASS INDEX: 29.52 KG/M2 | SYSTOLIC BLOOD PRESSURE: 117 MMHG | WEIGHT: 172 LBS

## 2023-01-06 LAB
COLONOSCOPY: NORMAL
UPPER GI ENDOSCOPY: NORMAL

## 2023-01-06 PROCEDURE — 88342 IMHCHEM/IMCYTCHM 1ST ANTB: CPT | Mod: 26 | Performed by: PATHOLOGY

## 2023-01-06 PROCEDURE — 250N000011 HC RX IP 250 OP 636: Performed by: NURSE ANESTHETIST, CERTIFIED REGISTERED

## 2023-01-06 PROCEDURE — 250N000009 HC RX 250: Performed by: NURSE ANESTHETIST, CERTIFIED REGISTERED

## 2023-01-06 PROCEDURE — 45378 DIAGNOSTIC COLONOSCOPY: CPT | Performed by: SPECIALIST

## 2023-01-06 PROCEDURE — 88342 IMHCHEM/IMCYTCHM 1ST ANTB: CPT | Mod: TC | Performed by: SPECIALIST

## 2023-01-06 PROCEDURE — 250N000009 HC RX 250: Performed by: SPECIALIST

## 2023-01-06 PROCEDURE — 370N000017 HC ANESTHESIA TECHNICAL FEE, PER MIN: Performed by: SPECIALIST

## 2023-01-06 PROCEDURE — 88305 TISSUE EXAM BY PATHOLOGIST: CPT | Mod: 26 | Performed by: PATHOLOGY

## 2023-01-06 PROCEDURE — 43239 EGD BIOPSY SINGLE/MULTIPLE: CPT | Mod: 51 | Performed by: SPECIALIST

## 2023-01-06 PROCEDURE — G0121 COLON CA SCRN NOT HI RSK IND: HCPCS | Performed by: SPECIALIST

## 2023-01-06 PROCEDURE — 258N000003 HC RX IP 258 OP 636: Performed by: NURSE ANESTHETIST, CERTIFIED REGISTERED

## 2023-01-06 PROCEDURE — 43239 EGD BIOPSY SINGLE/MULTIPLE: CPT | Performed by: SPECIALIST

## 2023-01-06 RX ORDER — LIDOCAINE 40 MG/G
CREAM TOPICAL
Status: DISCONTINUED | OUTPATIENT
Start: 2023-01-06 | End: 2023-01-06 | Stop reason: HOSPADM

## 2023-01-06 RX ORDER — GLYCOPYRROLATE 0.2 MG/ML
INJECTION, SOLUTION INTRAMUSCULAR; INTRAVENOUS PRN
Status: DISCONTINUED | OUTPATIENT
Start: 2023-01-06 | End: 2023-01-06

## 2023-01-06 RX ORDER — SODIUM CHLORIDE, SODIUM LACTATE, POTASSIUM CHLORIDE, CALCIUM CHLORIDE 600; 310; 30; 20 MG/100ML; MG/100ML; MG/100ML; MG/100ML
INJECTION, SOLUTION INTRAVENOUS CONTINUOUS PRN
Status: DISCONTINUED | OUTPATIENT
Start: 2023-01-06 | End: 2023-01-06

## 2023-01-06 RX ORDER — PROPOFOL 10 MG/ML
INJECTION, EMULSION INTRAVENOUS CONTINUOUS PRN
Status: DISCONTINUED | OUTPATIENT
Start: 2023-01-06 | End: 2023-01-06

## 2023-01-06 RX ORDER — LIDOCAINE HYDROCHLORIDE 20 MG/ML
INJECTION, SOLUTION INFILTRATION; PERINEURAL PRN
Status: DISCONTINUED | OUTPATIENT
Start: 2023-01-06 | End: 2023-01-06

## 2023-01-06 RX ORDER — PROPOFOL 10 MG/ML
INJECTION, EMULSION INTRAVENOUS PRN
Status: DISCONTINUED | OUTPATIENT
Start: 2023-01-06 | End: 2023-01-06

## 2023-01-06 RX ADMIN — LIDOCAINE HYDROCHLORIDE 0.5 ML: 10 INJECTION, SOLUTION EPIDURAL; INFILTRATION; INTRACAUDAL; PERINEURAL at 11:00

## 2023-01-06 RX ADMIN — PROPOFOL 250 MCG/KG/MIN: 10 INJECTION, EMULSION INTRAVENOUS at 11:17

## 2023-01-06 RX ADMIN — LIDOCAINE HYDROCHLORIDE 60 MG: 20 INJECTION, SOLUTION INFILTRATION; PERINEURAL at 11:17

## 2023-01-06 RX ADMIN — SODIUM CHLORIDE, POTASSIUM CHLORIDE, SODIUM LACTATE AND CALCIUM CHLORIDE: 600; 310; 30; 20 INJECTION, SOLUTION INTRAVENOUS at 11:12

## 2023-01-06 RX ADMIN — GLYCOPYRROLATE 0.1 MG: 0.2 INJECTION, SOLUTION INTRAMUSCULAR; INTRAVENOUS at 11:08

## 2023-01-06 RX ADMIN — PROPOFOL 100 MG: 10 INJECTION, EMULSION INTRAVENOUS at 11:17

## 2023-01-06 RX ADMIN — FAMOTIDINE 20 MG: 10 INJECTION INTRAVENOUS at 11:07

## 2023-01-06 ASSESSMENT — ACTIVITIES OF DAILY LIVING (ADL)
ADLS_ACUITY_SCORE: 35
ADLS_ACUITY_SCORE: 35

## 2023-01-06 ASSESSMENT — ENCOUNTER SYMPTOMS: DYSRHYTHMIAS: 1

## 2023-01-06 ASSESSMENT — LIFESTYLE VARIABLES: TOBACCO_USE: 1

## 2023-01-06 NOTE — ANESTHESIA PREPROCEDURE EVALUATION
Anesthesia Pre-Procedure Evaluation    Patient: Radha Beckwith   MRN: [id number] : 1975        Procedure : Procedure(s):  EGD and Colonoscopy           Past Medical History:   Diagnosis Date     Abdominal pain, right lower quadrant 2008    Admit. Discharged 03/10/08     Anxiety attack 2015     Atypical chest pain 2015     De Quervain's disease (tenosynovitis)      Dehydration      Gastric ulcer 2015     GERD (gastroesophageal reflux disease) 2010    Takes omeprazole and metoclopramide      Ingrowing nail 2014     Migraines      Opioid dependence in remission (H) 2015     Other and unspecified ovarian cyst      Papanicolaou smear of cervix with low grade squamous intraepithelial lesion (LGSIL) 2017      Past Surgical History:   Procedure Laterality Date     BIOPSY CERVICAL, LOCAL EXCISION, SINGLE/MULTIPLE N/A 8/10/2017    Procedure: BIOPSY CERVICAL, LOCAL EXCISION, SINGLE/MULTIPLE;;  Surgeon: Michael Chandler MD;  Location: [facility]     COLPOSCOPY, BIOPSY, COMBINED N/A 8/10/2017    Procedure: COMBINED COLPOSCOPY, BIOPSY;  Colposcopy with Cervical Biopsies and Endometrial Biopsy, Exam with Ultrasound;  Surgeon: Michael Chandler MD;  Location: [facility]     ESOPHAGOSCOPY, GASTROSCOPY, DUODENOSCOPY (EGD), COMBINED N/A 2017    Procedure: COMBINED ESOPHAGOSCOPY, GASTROSCOPY, DUODENOSCOPY (EGD);  Surgeon: Ibrahima Esposito MD;  Location: [facility]     EXAM UNDER ANESTHESIA PELVIC N/A 8/10/2017    Procedure: EXAM UNDER ANESTHESIA PELVIC;;  Surgeon: Michael Chandler MD;  Location: [facility]     HYSTERECTOMY       HYSTERECTOMY, PAP NO LONGER INDICATED       INJECT EPIDURAL LUMBAR Bilateral 2022    Procedure: Lumbar 5-Sacral 1 Transforaminal Epidural Steroid Injection with fluoroscopic guidance and contrast, bilateral;  Surgeon: Trevor Ivory MD;  Location: [facility]     LAPAROSCOPIC CHOLECYSTECTOMY N/A 2018    Procedure: LAPAROSCOPIC  CHOLECYSTECTOMY;  Laparoscopic Cholecystectomy;  Surgeon: Tigre Lowry DO;  Location:  OR     LAPAROSCOPIC HYSTERECTOMY TOTAL N/A 10/30/2017    Procedure: LAPAROSCOPIC HYSTERECTOMY TOTAL;  LAPAROSCOPIC HYSTERECTOMY TOTAL POSSIBLE SALPINGO-OOPHERECTOMY (BILATERAL);  Surgeon: Michael Chandler MD;  Location:  OR     [facility] UGI ENDOSCOPY, SIMPLE EXAM  01/07/08      Allergies   Allergen Reactions     Cafergot      12-            GI problems-     Seasonal Allergies      Sumatriptan      vomits after giving herself a shot     Compazine Anxiety     Droperidol Anxiety     Nubain [Nalbuphine Hcl] Anxiety     Prochlorperazine Palpitations     Uncontrolled movement      Social History     Tobacco Use     Smoking status: Every Day     Packs/day: 0.25     Years: 20.00     Pack years: 5.00     Types: Cigarettes     Smokeless tobacco: Never   Substance Use Topics     Alcohol use: Yes     Comment: occasional drinks; about 5-6 beers/week      Wt Readings from Last 1 Encounters:   11/07/22 78 kg (172 lb)        Anesthesia Evaluation   Pt has had prior anesthetic. Type: General and MAC.    No history of anesthetic complications       ROS/MED HX  ENT/Pulmonary:     (+) tobacco use, Current use, 5  Pack-Year Hx,      Neurologic:  - neg neurologic ROS     Cardiovascular:     (+) Dyslipidemia -----dysrhythmias (SVT ), Previous cardiac testing   Echo: Date: Results:    Stress Test: Date: Results:    ECG Reviewed: Date: 11/24/19 Results:  SR  Cath: Date: Results:      METS/Exercise Tolerance:     Hematologic:  - neg hematologic  ROS     Musculoskeletal:       GI/Hepatic:     (+) GERD, Asymptomatic on medication,     Renal/Genitourinary:  - neg Renal ROS     Endo:  - neg endo ROS     Psychiatric/Substance Use:     (+) psychiatric history depression and anxiety H/O chronic opiod use . : opiod dependence un remnissions     Infectious Disease:  - neg infectious disease ROS     Malignancy:  - neg malignancy ROS      Other:  - neg other ROS    (+) , H/O Chronic Pain,        Physical Exam    Airway  airway exam normal      Mallampati: II   TM distance: > 3 FB   Neck ROM: full   Mouth opening: > 3 cm    Respiratory Devices and Support         Dental     Comment: Pt states that all her teeth are loose. She will be seeing the dentist this year to address.       B=Bridge, C=Chipped, L=Loose, M=Missing    Cardiovascular   cardiovascular exam normal       Rhythm and rate: regular and normal     Pulmonary   pulmonary exam normal        breath sounds clear to auscultation           OUTSIDE LABS:  CBC:   Lab Results   Component Value Date    WBC 10.2 07/06/2021    WBC 7.7 03/02/2021    HGB 13.6 07/06/2021    HGB 14.1 03/02/2021    HCT 40.7 07/06/2021    HCT 42.8 03/02/2021     07/06/2021     03/02/2021     BMP:   Lab Results   Component Value Date     06/29/2022     07/06/2021    POTASSIUM 3.4 06/29/2022    POTASSIUM 3.9 07/06/2021    CHLORIDE 106 06/29/2022    CHLORIDE 104 07/06/2021    CO2 30 06/29/2022    CO2 31 07/06/2021    BUN 9 06/29/2022    BUN 10 07/06/2021    CR 0.68 06/29/2022    CR 0.75 07/06/2021     (H) 06/29/2022     (H) 07/06/2021     COAGS:   Lab Results   Component Value Date    PTT 24 03/24/2017    INR 0.87 03/24/2017     POC:   Lab Results   Component Value Date    HCG Negative 05/22/2013     HEPATIC:   Lab Results   Component Value Date    ALBUMIN 3.8 07/06/2021    PROTTOTAL 7.6 07/06/2021    ALT 34 07/06/2021    AST 33 07/06/2021    ALKPHOS 95 07/06/2021    BILITOTAL 0.5 07/06/2021     OTHER:   Lab Results   Component Value Date    A1C 5.0 11/24/2019    MOISES 8.2 (L) 06/29/2022    PHOS 2.7 08/17/2007    MAG 1.6 08/17/2007    LIPASE 59 (L) 07/06/2021    AMYLASE 37 01/24/2018    TSH 3.51 06/29/2022    CRP 6.3 03/02/2021    SED 12 03/02/2021       Anesthesia Plan    ASA Status:  2   NPO Status:  NPO Appropriate    Anesthesia Type: MAC.      Maintenance: TIVA.         Consents    Anesthesia Plan(s) and associated risks, benefits, and realistic alternatives discussed. Questions answered and patient/representative(s) expressed understanding.     - Discussed: Risks, Benefits and Alternatives for BOTH SEDATION and the PROCEDURE were discussed     - Discussed with:  Patient    Use of blood products discussed: No .     Postoperative Care            Comments:    Other Comments: The risks and benefits of anesthesia, and the alternatives where applicable, have been discussed with the patient, and they wish to proceed.                JEANNIE Keita CRNA

## 2023-01-06 NOTE — ANESTHESIA POSTPROCEDURE EVALUATION
Patient: Radha Beckwith    Procedure: Procedure(s):  COLONOSCOPY  ESOPHAGOGASTRODUODENOSCOPY, WITH BIOPSY       Anesthesia Type:  MAC    Note:  Disposition: Outpatient   Postop Pain Control: Uneventful            Sign Out: Well controlled pain   PONV: No   Neuro/Psych: Uneventful            Sign Out: Acceptable/Baseline neuro status   Airway/Respiratory: Uneventful            Sign Out: Acceptable/Baseline resp. status   CV/Hemodynamics: Uneventful            Sign Out: Acceptable CV status   Other NRE: NONE   DID A NON-ROUTINE EVENT OCCUR? No    Event details/Postop Comments:  Pt was happy with anesthesia care.  No complications.  I will follow up with the pt if needed.           Last vitals:  Vitals Value Taken Time   /81 01/06/23 1250   Temp     Pulse 85 01/06/23 1250   Resp 16 01/06/23 1250   SpO2 97 % 01/06/23 1254   Vitals shown include unvalidated device data.    Electronically Signed By: JEANNIE Keita CRNA  January 6, 2023  2:20 PM

## 2023-01-06 NOTE — ANESTHESIA CARE TRANSFER NOTE
Patient: Radha Beckwith    Procedure: Procedure(s):  COLONOSCOPY  ESOPHAGOGASTRODUODENOSCOPY, WITH BIOPSY       Diagnosis: Screen for colon cancer [Z12.11]  Diagnosis Additional Information: No value filed.    Anesthesia Type:   MAC     Note:    Oropharynx: oropharynx clear of all foreign objects and spontaneously breathing  Level of Consciousness: awake  Oxygen Supplementation: room air    Independent Airway: airway patency satisfactory and stable  Dentition: dentition unchanged  Vital Signs Stable: post-procedure vital signs reviewed and stable  Report to RN Given: handoff report given  Patient transferred to: Phase II    Handoff Report: Identifed the Patient, Identified the Reponsible Provider, Reviewed the pertinent medical history, Discussed the surgical course, Reviewed Intra-OP anesthesia mangement and issues during anesthesia, Set expectations for post-procedure period and Allowed opportunity for questions and acknowledgement of understanding      Vitals:  Vitals Value Taken Time   /81 01/06/23 1250   Temp     Pulse 85 01/06/23 1250   Resp 16 01/06/23 1250   SpO2 97 % 01/06/23 1254   Vitals shown include unvalidated device data.    Electronically Signed By: JEANNIE Keita CRNA  January 6, 2023  2:20 PM

## 2023-01-06 NOTE — DISCHARGE INSTRUCTIONS
[facility]    Home Care Following Endoscopy          Activity:  You have just undergone an endoscopic procedure usually performed with conscious sedation.  Do not work or operate machinery (including a car) for at least 12 hours.    I encourage you to walk and attempt to pass this air as soon as possible.    Diet:  Return to the diet you were on before your procedure but eat lightly for the first 12-24 hours.  Drink plenty of water.  Resume any regular medications unless otherwise advised by your physician.  Please begin any new medication prescribed as a result of your procedure as directed by your physician.   If you had any biopsy or polyp removed please refrain from aspirin or aspirin products for 2 days.  If on Coumadin please restart as instructed by your physician.   Pain:  You may take Tylenol as needed for pain.  Expected Recovery:  You can expect some mild abdominal fullness and/or discomfort due to the air used to inflate your intestinal tract. It is also normal to have a mild sore throat after upper endoscopy.    Call Your Physician if You Have:  After Upper Endoscopy:  Shoulder, back or chest pain.  Difficulty breathing or swallowing.  Vomiting blood.  After Colonoscopy:  Worsening persisting abdominal pain which is worse with activity.  Fevers (>101 degrees F), chills or shakes.  Passage of continued blood with bowel movements.   Any questions or concerns about your recovery, please call [phone] or after hours [phone] Nurse Advice Line.    Follow-up Care:  You did have polyps/biopsy tissue sample(s) removed.  The polyps/biopsy tissue sample(s) will be sent to pathology.  You should receive letter in your My Chart from Dr. Dozier with your results within 1-2 weeks. If you do not participate in My Chart a physical letter will come in the mail in 2-3 weeks.  Please call if you have not received a notification of your results.  If asked to return to clinic please make an  appointment 1 week after your procedure.  Call [phone].

## 2023-01-09 ENCOUNTER — VIRTUAL VISIT (OUTPATIENT)
Dept: BEHAVIORAL HEALTH | Facility: CLINIC | Age: 48
End: 2023-01-09
Payer: COMMERCIAL

## 2023-01-09 DIAGNOSIS — F41.1 GAD (GENERALIZED ANXIETY DISORDER): Primary | ICD-10-CM

## 2023-01-09 PROCEDURE — 90834 PSYTX W PT 45 MINUTES: CPT | Mod: 95 | Performed by: SOCIAL WORKER

## 2023-01-09 NOTE — PROGRESS NOTES
[facility]                                     Progress Note    Patient Name: Radha Beckwith  Date: 1/9/23         Service Type: Individual      Session Start Time: 1200  Session End Time: 1252     Session Length: 52    Session #: 5    Attendees: Client    Service Modality:  Video Visit:      Provider verified identity through the following two step process.  Patient provided:  Patient is known previously to provider    Telemedicine Visit: The patient's condition can be safely assessed and treated via synchronous audio and visual telemedicine encounter.      Reason for Telemedicine Visit: Patient has requested telehealth visit    Originating Site (Patient Location): Patient's home    Distant Site (Provider Location): Provider Remote Setting- Home Office    Consent:  The patient/guardian has verbally consented to: the potential risks and benefits of telemedicine (video visit) versus in person care; bill my insurance or make self-payment for services provided; and responsibility for payment of non-covered services.     Patient would like the video invitation sent by:  Send to e-mail at: [email or web address]    Mode of Communication:  [facility]    Distant Location (Provider):  Off-site    As the provider I attest to compliance with applicable laws and regulations related to telemedicine.    DATA  Interactive Complexity: No  Crisis: No        Progress Since Last Session (Related to Symptoms / Goals / Homework):   Symptoms: Improving. Indicated that she had a nice Holiday. Wasn't able to see her son although, was able to see other relatives.     Homework: Completed in session      Episode of Care Goals:  Satisfactory progress - ACTION (Actively working towards change); Intervened by reinforcing change plan / affirming steps taken     Current / Ongoing Stressors and Concerns:  Procrastination, difficulty focusing and follow through. Patient indicated that this has been a  longstanding issue that can negatively impact her life.     Treatment Objective(s) Addressed in This Session:   Discussed  positivity and looking at the CBT worksheets to use when journaling that will help improve her mood. Trying to manage      Intervention:    CBT, solution focused therapy    Assessments completed prior to visit:  The following assessments were completed by patient for this visit:  PHQ9:   PHQ-9 SCORE 12/2/2021 6/27/2022 8/30/2022 10/4/2022 11/8/2022 12/22/2022 1/1/2023   PHQ-9 Total Score MyChart 13 (Moderate depression) 12 (Moderate depression) 16 (Moderately severe depression) 18 (Moderately severe depression) 18 (Moderately severe depression) 18 (Moderately severe depression) 18 (Moderately severe depression)   PHQ-9 Total Score 13 12 16 18 18 18 18     GAD7:   BO-7 SCORE 7/9/2021 12/2/2021 6/27/2022 8/30/2022 10/26/2022 12/4/2022 1/1/2023   Total Score 11 (moderate anxiety) 17 (severe anxiety) 12 (moderate anxiety) 16 (severe anxiety) 16 (severe anxiety) 19 (severe anxiety) 20 (severe anxiety)   Total Score 11 17 12 16 16 19 20     PROMIS 10-Global Health (all questions and answers displayed):   PROMIS 10 10/26/2022   In general, would you say your health is: Fair   In general, would you say your quality of life is: Poor   In general, how would you rate your physical health? Poor   In general, how would you rate your mental health, including your mood and your ability to think? Fair   In general, how would you rate your satisfaction with your social activities and relationships? Poor   In general, please rate how well you carry out your usual social activities and roles Poor   To what extent are you able to carry out your everyday physical activities such as walking, climbing stairs, carrying groceries, or moving a chair? A little   How often have you been bothered by emotional problems such as feeling anxious, depressed or irritable? Always   How would you rate your fatigue on average?  Moderate   How would you rate your pain on average?   0 = No Pain  to  10 = Worst Imaginable Pain 8   In general, would you say your health is: 2   In general, would you say your quality of life is: 1   In general, how would you rate your physical health? 1   In general, how would you rate your mental health, including your mood and your ability to think? 2   In general, how would you rate your satisfaction with your social activities and relationships? 1   In general, please rate how well you carry out your usual social activities and roles. (This includes activities at home, at work and in your community, and responsibilities as a parent, child, spouse, employee, friend, etc.) 1   To what extent are you able to carry out your everyday physical activities such as walking, climbing stairs, carrying groceries, or moving a chair? 2   In the past 7 days, how often have you been bothered by emotional problems such as feeling anxious, depressed, or irritable? 5   In the past 7 days, how would you rate your fatigue on average? 3   In the past 7 days, how would you rate your pain on average, where 0 means no pain, and 10 means worst imaginable pain? 8   Global Mental Health Score 5   Global Physical Health Score 8   PROMIS TOTAL - SUBSCORES 13   Some recent data might be hidden         ASSESSMENT: Current Emotional / Mental Status (status of significant symptoms):   Risk status (Self / Other harm or suicidal ideation)   Patient denies current fears or concerns for personal safety.   Patient denies current or recent suicidal ideation or behaviors.   Patient denies current or recent homicidal ideation or behaviors.   Patient denies current or recent self injurious behavior or ideation.   Patient denies other safety concerns.   Patient reports there has been no change in risk factors since their last session.     Patient reports there has been no change in protective factors since their last session.     Recommended that  patient call 911 or go to the local ED should there be a change in any of these risk factors.     Appearance:   Appropriate    Eye Contact:   Good    Psychomotor Behavior: Normal    Attitude:   Cooperative    Orientation:   All   Speech    Rate / Production: Normal     Volume:  Normal    Mood:    Normal   Affect:    Appropriate    Thought Content:  Clear    Thought Form:  Coherent  Logical    Insight:    Good      Medication Review:   No changes to current psychiatric medication(s)     Medication Compliance:   Yes     Changes in Health Issues:   None reported     Chemical Use Review:   Substance Use: Chemical use reviewed, no active concerns identified      Tobacco Use: No current tobacco use.      Diagnosis:  Generalized Anxiety Disorder    Collateral Reports Completed:   Routed note to PCP    PLAN: (Patient Tasks / Therapist Tasks / Other)  1. Continued self care strategies.  2. Continue current medication regime.   3. Stop thought processing techniques.  4. Pt. will speak will her Md. regarding [facility]  1/9/23                                                                ______________________________________________________________________  Individual Treatment Plan     Patient's Name: Radha Beckwith                    YOB: 1975     Date of Creation: 12/5/22  Date Treatment Plan Last Reviewed/Revised:      DSM5 Diagnoses: Generalized anxiety disorder  Psychosocial / Contextual Factors: Current stressors with holiday season coming up and medical concerns.  PROMIS (reviewed every 90 days):      Referral / Collaboration:  Referral to another professional/service is not indicated at this time..     Anticipated number of session for this episode of care: 6-9 sessions  Anticipation frequency of session: Biweekly  Anticipated Duration of each session: 38-52 minutes  Treatment plan will be reviewed in 90 days or when goals have been changed.         MeasurableTreatment Goal(s)  related to diagnosis / functional impairment(s)  MeasurableTreatment Goal(s) related to diagnosis / functional impairment(s)  Goal 1: Patient will focus on recognizing and managing symptoms of anxiety as evidenced by a BO-7 score of 5 or less    I will know I've met my goal when I do not have to call and reach out for extra support from the clinic.       Objective #A (Patient Action)                          Patient will practice deep breathing at least 3-5 a day.  Status: New - Date 12/5/22     Objective #B  Patient will attend and participate in social or recreational activities At least 3 times per week.  Status: New -  Date 12/5/22        Objective #C  Patient will use at least 5 coping skills for anxiety management in the next 12 weeks.  Status: New  - Date 12/5/22        Objective #D                Patient will use thought-stopping strategy daily to reduce intrusive thoughts.  Status: New - Date: 12/5/22     Objective #E  Patient will use distraction each time intrusive worry surfaces.                       Status: New - Date: 12/5/22     Objective #F  Patient will Improve quantity and quality of night time sleep / decrease daytime naps.  Status: New - Date: New 12/5/22      Intervention(s)  Therapist will teach CBT skills to challenge cognitive distortions and core beliefs.  Therapist will teach and model positive self-talk behaviors.  Therapist will use psychodynamic approaches to explore early attachments and schemas.  Therapist will teach DBT mindfulness and emotion regulation skills.                Patient has reviewed and agreed to the above plan.     Swetha Weinberg [facility]                December 5, 2022     Answers for HPI/ROS submitted by the patient on 12/4/2022  BO 7 TOTAL SCORE: 19     Answers for HPI/ROS submitted by the patient on 12/22/2022  If you checked off any problems, how difficult have these problems made it for you to do your work, take care of things at home, or get along with other  people?: Extremely difficult  PHQ9 TOTAL SCORE: 18

## 2023-01-10 ENCOUNTER — VIRTUAL VISIT (OUTPATIENT)
Dept: FAMILY MEDICINE | Facility: CLINIC | Age: 48
End: 2023-01-10
Payer: COMMERCIAL

## 2023-01-10 DIAGNOSIS — K21.00 GASTROESOPHAGEAL REFLUX DISEASE WITH ESOPHAGITIS WITHOUT HEMORRHAGE: ICD-10-CM

## 2023-01-10 DIAGNOSIS — F41.9 ANXIETY: ICD-10-CM

## 2023-01-10 DIAGNOSIS — M54.2 CHRONIC NECK PAIN: ICD-10-CM

## 2023-01-10 DIAGNOSIS — F33.1 MODERATE EPISODE OF RECURRENT MAJOR DEPRESSIVE DISORDER (H): Primary | ICD-10-CM

## 2023-01-10 DIAGNOSIS — R11.0 NAUSEA: ICD-10-CM

## 2023-01-10 DIAGNOSIS — F11.21 OPIOID DEPENDENCE IN REMISSION (H): ICD-10-CM

## 2023-01-10 DIAGNOSIS — G89.29 CHRONIC NECK PAIN: ICD-10-CM

## 2023-01-10 PROCEDURE — 99214 OFFICE O/P EST MOD 30 MIN: CPT | Mod: GT | Performed by: NURSE PRACTITIONER

## 2023-01-10 RX ORDER — DULOXETIN HYDROCHLORIDE 30 MG/1
30 CAPSULE, DELAYED RELEASE ORAL 2 TIMES DAILY
Qty: 180 CAPSULE | Refills: 0 | Status: SHIPPED | OUTPATIENT
Start: 2023-01-10 | End: 2023-07-24

## 2023-01-10 RX ORDER — ONDANSETRON 4 MG/1
TABLET, ORALLY DISINTEGRATING ORAL
Qty: 20 TABLET | Refills: 0 | Status: SHIPPED | OUTPATIENT
Start: 2023-01-10 | End: 2023-02-14

## 2023-01-10 RX ORDER — ALPRAZOLAM 1 MG
1 TABLET ORAL 2 TIMES DAILY
Qty: 60 TABLET | Refills: 0 | Status: SHIPPED | OUTPATIENT
Start: 2023-01-12 | End: 2023-02-14

## 2023-01-10 NOTE — PROGRESS NOTES
Linette is a 47 year old who is being evaluated via a billable video visit.      How would you like to obtain your AVS? MyChart  If the video visit is dropped, the invitation should be resent by: Text to cell phone: [phone]  Will anyone else be joining your video visit? No        Moderate episode of recurrent major depressive disorder (H)  PHQ-9 is 18.  Denies suicidal ideation.  Has not done counseling or seen psychiatry for awhile.  Will reach out to her counselor.  Referral to Psychiatry as has been on many medications in the past.  She really wants to increase her xanax back to 1mg twice daily.  Discussed we would do this temporarily until she sees psych monitor for sedation with her suboxone, seroquel.  She has been on the 1mg twice daily in the past and she came to me on these medications.  She did not follow up with Psych after our last visit.  Referral placed again today and discussed would not recommend the xanax twice a day long term- especially along with her suboxone, seroquel, ambien, flexeril.  Discussed risks of this mixture of medications- she came to me on them.   - [place] BEHAV ASSMT W/SCORE & DOCD/STAND INSTRUMENT     Opioid dependence in remission (H)  Followed and on suboxone- states positive THC with them was from CBD gummies  - Drug Confirmation Panel Urine with Creatinine     Anxiety  As above  - ALPRAZolam (XANAX) 1 MG tablet  Dispense: 60 tablet; Refill: 0    Chronic neck pain  Has seen pain management in past- was not happy.  Would like alternative treatments as suboxone not working like is used to .  Referral sent . lyrica was not tolerated.        Gastroesophageal reflux disease, unspecified whether esophagitis present/ nausea  Not controlled on PPI and pepcid.  Referral for endoscopy- which was normal.  Referral GI for further evaluation of reflux and nausea-= takes 20 zofran per month.     - famotidine (PEPCID) 40 MG tablet  Dispense: 30 tablet; Refill: 11                 Subjective    Linette is a 47 year old , presenting for the following health issues:  Recheck Medication      HPI     Working with Counseling.  Has had 3 sessions- working on depression and anxiety and mood.    Had wanted her to see Psych for increased anxiety- on suboxone and seroquel.  Working with Dr. Gunter- suboxone.  Also with [facility]- heat and gas assistance.      Does not like to see Psychiatry- has had issues with her son through out the years.  Has not made follow up.  She feels she can decrease xanax she feels a lot of her anxiety is due to back and wrists- pain wise.      Nausea- on zofran- had endoscopy and colonoscopy last week.  Colon- did not have full prep.      Saw pain management [facility]- did lyrica- had horrid dreams from this and was not happy with care.          Review of Systems   Constitutional, HEENT, cardiovascular, pulmonary, GI, , musculoskeletal, neuro, skin, endocrine and psych systems are negative, except as otherwise noted.      Objective           Vitals:  No vitals were obtained today due to virtual visit.    Physical Exam   GENERAL: Healthy, alert and no distress

## 2023-01-11 LAB
PATH REPORT.ADDENDUM SPEC: NORMAL
PATH REPORT.COMMENTS IMP SPEC: NORMAL
PATH REPORT.COMMENTS IMP SPEC: NORMAL
PATH REPORT.FINAL DX SPEC: NORMAL
PATH REPORT.GROSS SPEC: NORMAL
PATH REPORT.MICROSCOPIC SPEC OTHER STN: NORMAL
PATH REPORT.RELEVANT HX SPEC: NORMAL
PHOTO IMAGE: NORMAL

## 2023-01-13 NOTE — TELEPHONE ENCOUNTER
REFERRAL INFORMATION:    Referring Provider:  Gisselle Linn NP    Referring Clinic:  [facility]  Reason for Visit/Diagnosis:   Nausea   Gastroesophageal reflux disease with esophagitis without hemorrhage      FUTURE VISIT INFORMATION:    Appointment Date: 2/1/2023     NOTES STATUS DETAILS   OFFICE NOTE from Referring Provider Internal 1/10/2023, 12/2/2021 VV with DAKOTAH Linn   OFFICE NOTE from Other Specialist N/A    HOSPITAL DISCHARGE SUMMARY/  ED VISITS N/A    OPERATIVE REPORT N/A    MEDICATION LIST Internal         ENDOSCOPY  Internal 1/6/2023 4/17/2017   COLONOSCOPY Internal 1/6/2023ESOPHAGOGASTRODUODENOSCOPY, WITH BIOPSY     ERCP N/A    EUS N/A    STOOL TESTING N/A    PERTINENT LABS N/A    PATHOLOGY REPORTS (RELATED) N/A    IMAGING (CT, MRI, EGD, MRCP, Small Bowel Follow Through/SBT, MR/CT Enterography) Internal CT:   7/6/2021 Abdomen Pelvis    12/21/2017 Abdomen Pelvis   NM:   1/24/2018 Heaptobiliary scan  US:   12/22/2017 Abdomen

## 2023-01-19 ENCOUNTER — VIRTUAL VISIT (OUTPATIENT)
Dept: BEHAVIORAL HEALTH | Facility: CLINIC | Age: 48
End: 2023-01-19
Payer: COMMERCIAL

## 2023-01-19 DIAGNOSIS — F41.1 GAD (GENERALIZED ANXIETY DISORDER): Primary | ICD-10-CM

## 2023-01-19 PROCEDURE — 90834 PSYTX W PT 45 MINUTES: CPT | Mod: 95 | Performed by: SOCIAL WORKER

## 2023-01-19 NOTE — PROGRESS NOTES
[facility]                                     Progress Note    Patient Name: Radha Beckwith  Date: 1/19/23         Service Type: Individual      Session Start Time: 1400  Session End Time: 1452     Session Length: 52    Session #: 19    Attendees: Client    Service Modality:  Video Visit:      Provider verified identity through the following two step process.  Patient provided:  Patient is known previously to provider    Telemedicine Visit: The patient's condition can be safely assessed and treated via synchronous audio and visual telemedicine encounter.      Reason for Telemedicine Visit: Patient has requested telehealth visit    Originating Site (Patient Location): Patient's home    Distant Site (Provider Location): Provider Remote Setting- Home Office    Consent:  The patient/guardian has verbally consented to: the potential risks and benefits of telemedicine (video visit) versus in person care; bill my insurance or make self-payment for services provided; and responsibility for payment of non-covered services.     Patient would like the video invitation sent by:  Send to e-mail at: [email or web address]    Mode of Communication:  Video Conference via [facility]    Distant Location (Provider):  Off-site    As the provider I attest to compliance with applicable laws and regulations related to telemedicine.    DATA  Interactive Complexity: No  Crisis: No        Progress Since Last Session (Related to Symptoms / Goals / Homework):   Symptoms: Improving mood improving    Homework: Achieved / completed to satisfaction      Episode of Care Goals: Satisfactory progress - PREPARATION (Decided to change - considering how); Intervened by negotiating a change plan and determining options / strategies for behavior change, identifying triggers, exploring social supports, and working towards setting a date to begin behavior change              Current / Ongoing Stressors and Concerns:           Procrastination, difficulty focusing and follow through. Patient indicated that this            has been a longstanding issue that can negatively impact her life.                 Treatment Objective(s) Addressed in This Session:          During today's session discussed the changes in the world and how to                approach situations. Continue to increase her level of socialization in order to improve her  mood.           Intervention:        Routed note to PCP    Assessments completed prior to visit:  The following assessments were completed by patient for this visit:  PHQ9:   PHQ-9 SCORE 12/2/2021 6/27/2022 8/30/2022 10/4/2022 11/8/2022 12/22/2022 1/1/2023   PHQ-9 Total Score MyChart 13 (Moderate depression) 12 (Moderate depression) 16 (Moderately severe depression) 18 (Moderately severe depression) 18 (Moderately severe depression) 18 (Moderately severe depression) 18 (Moderately severe depression)   PHQ-9 Total Score 13 12 16 18 18 18 18     GAD7:   BO-7 SCORE 7/9/2021 12/2/2021 6/27/2022 8/30/2022 10/26/2022 12/4/2022 1/1/2023   Total Score 11 (moderate anxiety) 17 (severe anxiety) 12 (moderate anxiety) 16 (severe anxiety) 16 (severe anxiety) 19 (severe anxiety) 20 (severe anxiety)   Total Score 11 17 12 16 16 19 20     PROMIS 10-Global Health (all questions and answers displayed):   PROMIS 10 10/26/2022   In general, would you say your health is: Fair   In general, would you say your quality of life is: Poor   In general, how would you rate your physical health? Poor   In general, how would you rate your mental health, including your mood and your ability to think? Fair   In general, how would you rate your satisfaction with your social activities and relationships? Poor   In general, please rate how well you carry out your usual social activities and roles Poor   To what extent are you able to carry out your everyday physical activities such as walking, climbing stairs, carrying groceries, or moving a  chair? A little   How often have you been bothered by emotional problems such as feeling anxious, depressed or irritable? Always   How would you rate your fatigue on average? Moderate   How would you rate your pain on average?   0 = No Pain  to  10 = Worst Imaginable Pain 8   In general, would you say your health is: 2   In general, would you say your quality of life is: 1   In general, how would you rate your physical health? 1   In general, how would you rate your mental health, including your mood and your ability to think? 2   In general, how would you rate your satisfaction with your social activities and relationships? 1   In general, please rate how well you carry out your usual social activities and roles. (This includes activities at home, at work and in your community, and responsibilities as a parent, child, spouse, employee, friend, etc.) 1   To what extent are you able to carry out your everyday physical activities such as walking, climbing stairs, carrying groceries, or moving a chair? 2   In the past 7 days, how often have you been bothered by emotional problems such as feeling anxious, depressed, or irritable? 5   In the past 7 days, how would you rate your fatigue on average? 3   In the past 7 days, how would you rate your pain on average, where 0 means no pain, and 10 means worst imaginable pain? 8   Global Mental Health Score 5   Global Physical Health Score 8   PROMIS TOTAL - SUBSCORES 13   Some recent data might be hidden         ASSESSMENT: Current Emotional / Mental Status (status of significant symptoms):   Risk status (Self / Other harm or suicidal ideation)   Patient denies current fears or concerns for personal safety.   Patient denies current or recent suicidal ideation or behaviors.   Patient denies current or recent homicidal ideation or behaviors.   Patient denies current or recent self injurious behavior or ideation.   Patient denies other safety concerns.   Patient reports there has  been no change in risk factors since their last session.     Patient reports there has been no change in protective factors since their last session.     Recommended that patient call 911 or go to the local ED should there be a change in any of these risk factors.     Appearance:   Appropriate    Eye Contact:   Good    Psychomotor Behavior: Normal    Attitude:   Cooperative    Orientation:   All   Speech    Rate / Production: Normal     Volume:  Normal    Mood:    Normal   Affect:    Appropriate    Thought Content:  Clear    Thought Form:  Coherent  Logical    Insight:    Good      Medication Review:   No changes to current psychiatric medication(s)     Medication Compliance:   Yes     Changes in Health Issues:   None reported     Chemical Use Review:   Substance Use: Chemical use reviewed, no active concerns identified      Tobacco Use: No current tobacco use.      Diagnosis:  Generalized Anxiety Disorder    Collateral Reports Completed:   Routed note to PCP    PLAN: (Patient Tasks / Therapist Tasks / Other)  1. Continued self care strategies.  2. Continue current medication regime.   3. Stop thought processing techniques.  4. Pt. will speak will her Md. regarding Jessica Weinberg [facility]  1/19/23                                                         ______________________________________________________________________  Individual Treatment Plan     Patient's Name: Radha Beckwith                    YOB: 1975     Date of Creation: 12/5/22  Date Treatment Plan Last Reviewed/Revised:      DSM5 Diagnoses: Generalized anxiety disorder  Psychosocial / Contextual Factors: Current stressors with holiday season coming up and medical concerns.  PROMIS (reviewed every 90 days):      Referral / Collaboration:  Referral to another professional/service is not indicated at this time..     Anticipated number of session for this episode of care: 6-9 sessions  Anticipation frequency of  session: Biweekly  Anticipated Duration of each session: 38-52 minutes  Treatment plan will be reviewed in 90 days or when goals have been changed.         MeasurableTreatment Goal(s) related to diagnosis / functional impairment(s)  MeasurableTreatment Goal(s) related to diagnosis / functional impairment(s)  Goal 1: Patient will focus on recognizing and managing symptoms of anxiety as evidenced by a BO-7 score of 5 or less    I will know I've met my goal when I do not have to call and reach out for extra support from the clinic.       Objective #A (Patient Action)                          Patient will practice deep breathing at least 3-5 a day.  Status: New - Date 12/5/22     Objective #B  Patient will attend and participate in social or recreational activities At least 3 times per week.  Status: New -  Date 12/5/22        Objective #C  Patient will use at least 5 coping skills for anxiety management in the next 12 weeks.  Status: New  - Date 12/5/22        Objective #D                Patient will use thought-stopping strategy daily to reduce intrusive thoughts.  Status: New - Date: 12/5/22     Objective #E  Patient will use distraction each time intrusive worry surfaces.                       Status: New - Date: 12/5/22     Objective #F  Patient will Improve quantity and quality of night time sleep / decrease daytime naps.  Status: New - Date: New 12/5/22      Intervention(s)  Therapist will teach CBT skills to challenge cognitive distortions and core beliefs.  Therapist will teach and model positive self-talk behaviors.  Therapist will use psychodynamic approaches to explore early attachments and schemas.  Therapist will teach DBT mindfulness and emotion regulation skills.       Patient has reviewed and agreed to the above plan.     Swetha Weinberg [facility]                December 5, 2022     Answers for HPI/ROS submitted by the patient on 12/4/2022  BO 7 TOTAL SCORE: 19     Answers for HPI/ROS submitted by the  patient on 12/22/2022  If you checked off any problems, how difficult have these problems made it for you to do your work, take care of things at home, or get along with other people?: Extremely difficult  PHQ9 TOTAL SCORE: 18

## 2023-01-20 DIAGNOSIS — J30.2 SEASONAL ALLERGIC RHINITIS, UNSPECIFIED TRIGGER: ICD-10-CM

## 2023-01-24 RX ORDER — CETIRIZINE HYDROCHLORIDE 10 MG/1
TABLET ORAL
Qty: 90 TABLET | Refills: 3 | Status: SHIPPED | OUTPATIENT
Start: 2023-01-24 | End: 2024-08-19

## 2023-01-24 NOTE — TELEPHONE ENCOUNTER
Prescription approved per [facility] Refill Protocol.  Jahaira Adams RN on 1/24/2023 at 8:27 AM

## 2023-01-25 ENCOUNTER — VIRTUAL VISIT (OUTPATIENT)
Dept: BEHAVIORAL HEALTH | Facility: CLINIC | Age: 48
End: 2023-01-25
Payer: COMMERCIAL

## 2023-01-25 DIAGNOSIS — F41.1 GAD (GENERALIZED ANXIETY DISORDER): Primary | ICD-10-CM

## 2023-01-25 PROCEDURE — 90834 PSYTX W PT 45 MINUTES: CPT | Mod: 95 | Performed by: SOCIAL WORKER

## 2023-01-25 NOTE — PROGRESS NOTES
"[facility]                                     Progress Note    Patient Name: Radha Beckwith  Date: 1/25/23         Service Type: Individual      Session Start Time: 1600  Session End Time: 1652     Session Length   :52    Session #: 20    Attendees: Client    Service Modality:  Video Visit:      Provider verified identity through the following two step process.  Patient provided:  Patient is known previously to provider    Telemedicine Visit: The patient's condition can be safely assessed and treated via synchronous audio and visual telemedicine encounter.      Reason for Telemedicine Visit: Patient has requested telehealth visit    Originating Site (Patient Location): Patient's home    Distant Site (Provider Location): Provider Remote Setting- Home Office    Consent:  The patient/guardian has verbally consented to: the potential risks and benefits of telemedicine (video visit) versus in person care; bill my insurance or make self-payment for services provided; and responsibility for payment of non-covered services.     Patient would like the video invitation sent by:  Send to e-mail at: [email or web address]    Mode of Communication:  [facility] Conference via [facility]    Distant Location (Provider):  Off-site    As the provider I attest to compliance with applicable laws and regulations related to telemedicine.    DATA  Interactive Complexity: No  Crisis: No        Progress Since Last Session (Related to Symptoms / Goals / Homework):  Symptoms: No change. Feelings of awkordness with her family of origin. \"Trying to figure out who I am.\"    Homework: Completed in session      Episode of Care Goals: Satisfactory progress - PREPARATION (Decided to change - considering how); Intervened by negotiating a change plan and determining options / strategies for behavior change, identifying triggers, exploring social supports, and working towards setting a date to begin behavior change     Current / " "Ongoing Stressors and Concerns:   Family of origins issues regarding the dynamics between her mother and her son.      Treatment Objective(s) Addressed in This Session:   Discussed where she fits into the family. Personal Identity issues. Dad  when he was 55 yrs. of age. Discussed a time-line of positive, negative and neutral events.  Traumatic events that occurred as a child. (\"Two sides of ugly between her Mom and Step-parent.                Intervention:   CBT, solution focused therapy              Time -line of her life    Assessments completed prior to visit:  The following assessments were completed by patient for this visit:  PHQ9:   PHQ-9 SCORE 2021 2022 2022 10/4/2022 2022 2022 2023   PHQ-9 Total Score MyChart 13 (Moderate depression) 12 (Moderate depression) 16 (Moderately severe depression) 18 (Moderately severe depression) 18 (Moderately severe depression) 18 (Moderately severe depression) 18 (Moderately severe depression)   PHQ-9 Total Score 13 12 16 18 18 18 18     GAD7:   BO-7 SCORE 2021 2021 2022 2022 10/26/2022 2022 2023   Total Score 11 (moderate anxiety) 17 (severe anxiety) 12 (moderate anxiety) 16 (severe anxiety) 16 (severe anxiety) 19 (severe anxiety) 20 (severe anxiety)   Total Score 11 17 12 16 16 19 20     PROMIS 10-Global Health (all questions and answers displayed):   PROMIS 10 10/26/2022 2023   In general, would you say your health is: Fair Poor   In general, would you say your quality of life is: Poor Poor   In general, how would you rate your physical health? Poor Poor   In general, how would you rate your mental health, including your mood and your ability to think? Fair Fair   In general, how would you rate your satisfaction with your social activities and relationships? Poor Fair   In general, please rate how well you carry out your usual social activities and roles Poor Fair   To what extent are you able to carry " out your everyday physical activities such as walking, climbing stairs, carrying groceries, or moving a chair? A little A little   How often have you been bothered by emotional problems such as feeling anxious, depressed or irritable? Always Always   How would you rate your fatigue on average? Moderate Severe   How would you rate your pain on average?   0 = No Pain  to  10 = Worst Imaginable Pain 8 8   In general, would you say your health is: 2 1   In general, would you say your quality of life is: 1 1   In general, how would you rate your physical health? 1 1   In general, how would you rate your mental health, including your mood and your ability to think? 2 2   In general, how would you rate your satisfaction with your social activities and relationships? 1 2   In general, please rate how well you carry out your usual social activities and roles. (This includes activities at home, at work and in your community, and responsibilities as a parent, child, spouse, employee, friend, etc.) 1 2   To what extent are you able to carry out your everyday physical activities such as walking, climbing stairs, carrying groceries, or moving a chair? 2 2   In the past 7 days, how often have you been bothered by emotional problems such as feeling anxious, depressed, or irritable? 5 5   In the past 7 days, how would you rate your fatigue on average? 3 4   In the past 7 days, how would you rate your pain on average, where 0 means no pain, and 10 means worst imaginable pain? 8 8   Global Mental Health Score 5 6   Global Physical Health Score 8 7   PROMIS TOTAL - SUBSCORES 13 13   Some recent data might be hidden         ASSESSMENT: Current Emotional / Mental Status (status of significant symptoms):   Risk status (Self / Other harm or suicidal ideation)   Patient denies current fears or concerns for personal safety.   Patient denies current or recent suicidal ideation or behaviors.   Patient denies current or recent homicidal  ideation or behaviors.   Patient denies current or recent self injurious behavior or ideation.   Patient denies other safety concerns.   Patient reports there has been no change in risk factors since their last session.     Patient reports there has been no change in protective factors since their last session.     Recommended that patient call 911 or go to the local ED should there be a change in any of these risk factors.     Appearance:   Appropriate    Eye Contact:   Good    Psychomotor Behavior: Normal    Attitude:   Cooperative    Orientation:   All   Speech    Rate / Production: Normal     Volume:  Normal    Mood:    Normal   Affect:    Appropriate    Thought Content:  Clear    Thought Form:  Coherent  Logical    Insight:    Good      Medication Review:   No changes to current psychiatric medication(s)     Medication Compliance:   Yes     Changes in Health Issues:   None reported     Chemical Use Review:   Substance Use: Chemical use reviewed, no active concerns identified      Tobacco Use: No current tobacco use.      Diagnosis:  Generalized Anxiety Disorder    Collateral Reports Completed:   Routed note to PCP    PLAN: (Patient Tasks / Therapist Tasks / Other)  1. Continued self care strategies.  2. Continue current medication regime.   3. Stop thought processing techniques.  4. Pt. will speak will her Md. regarding Lyrica   5. Recc. The Book (How did I get Here?    Swetha Weinberg LICSW  1/25/23                                                                    ______________________________________________________________________  Individual Treatment Plan     Patient's Name: Radha Beckwith                    YOB: 1975     Date of Creation: 12/5/22  Date Treatment Plan Last Reviewed/Revised:      DSM5 Diagnoses: Generalized anxiety disorder  Psychosocial / Contextual Factors: Current stressors with holiday season coming up and medical concerns.  PROMIS (reviewed every 90 days):       Referral / Collaboration:  Referral to another professional/service is not indicated at this time..     Anticipated number of session for this episode of care: 6-9 sessions  Anticipation frequency of session: Biweekly  Anticipated Duration of each session: 38-52 minutes  Treatment plan will be reviewed in 90 days or when goals have been changed.         MeasurableTreatment Goal(s) related to diagnosis / functional impairment(s)  MeasurableTreatment Goal(s) related to diagnosis / functional impairment(s)  Goal 1: Patient will focus on recognizing and managing symptoms of anxiety as evidenced by a BO-7 score of 5 or less    I will know I've met my goal when I do not have to call and reach out for extra support from the clinic.       Objective #A (Patient Action)                          Patient will practice deep breathing at least 3-5 a day.  Status: New - Date 12/5/22     Objective #B  Patient will attend and participate in social or recreational activities At least 3 times per week.  Status: New -  Date 12/5/22        Objective #C  Patient will use at least 5 coping skills for anxiety management in the next 12 weeks.  Status: New  - Date 12/5/22        Objective #D                Patient will use thought-stopping strategy daily to reduce intrusive thoughts.  Status: New - Date: 12/5/22     Objective #E  Patient will use distraction each time intrusive worry surfaces.                       Status: New - Date: 12/5/22     Objective #F  Patient will Improve quantity and quality of night time sleep / decrease daytime naps.  Status: New - Date: New 12/5/22      Intervention(s)  Therapist will teach CBT skills to challenge cognitive distortions and core beliefs.  Therapist will teach and model positive self-talk behaviors.  Therapist will use psychodynamic approaches to explore early attachments and schemas.  Therapist will teach DBT mindfulness and emotion regulation skills.                Patient has reviewed and  agreed to the above plan.     Swetha Weinberg [facility]                December 5, 2022     Answers for HPI/ROS submitted by the patient on 12/4/2022  BO 7 TOTAL SCORE: 19     Answers for HPI/ROS submitted by the patient on 12/22/2022  If you checked off any problems, how difficult have these problems made it for you to do your work, take care of things at home, or get along with other people?: Extremely difficult  PHQ9 TOTAL SCORE: 18

## 2023-02-01 ENCOUNTER — PRE VISIT (OUTPATIENT)
Dept: GASTROENTEROLOGY | Facility: CLINIC | Age: 48
End: 2023-02-01

## 2023-02-01 ENCOUNTER — VIRTUAL VISIT (OUTPATIENT)
Dept: GASTROENTEROLOGY | Facility: CLINIC | Age: 48
End: 2023-02-01
Attending: NURSE PRACTITIONER
Payer: COMMERCIAL

## 2023-02-01 DIAGNOSIS — R19.00 ABDOMINAL MASS, UNSPECIFIED ABDOMINAL LOCATION: Primary | ICD-10-CM

## 2023-02-01 DIAGNOSIS — F51.04 PSYCHOPHYSIOLOGICAL INSOMNIA: ICD-10-CM

## 2023-02-01 DIAGNOSIS — K59.03 CONSTIPATION DUE TO OPIOID THERAPY: ICD-10-CM

## 2023-02-01 DIAGNOSIS — T40.2X5A CONSTIPATION DUE TO OPIOID THERAPY: ICD-10-CM

## 2023-02-01 DIAGNOSIS — K21.00 GASTROESOPHAGEAL REFLUX DISEASE WITH ESOPHAGITIS WITHOUT HEMORRHAGE: ICD-10-CM

## 2023-02-01 PROCEDURE — 99204 OFFICE O/P NEW MOD 45 MIN: CPT | Mod: 95 | Performed by: NURSE PRACTITIONER

## 2023-02-01 RX ORDER — PANTOPRAZOLE SODIUM 40 MG/1
40 TABLET, DELAYED RELEASE ORAL DAILY
Qty: 30 TABLET | Refills: 3 | Status: SHIPPED | OUTPATIENT
Start: 2023-02-01 | End: 2023-05-23

## 2023-02-01 RX ORDER — SUCRALFATE 1 G/1
1 TABLET ORAL 3 TIMES DAILY
Qty: 90 TABLET | Refills: 0 | Status: SHIPPED | OUTPATIENT
Start: 2023-02-01 | End: 2023-04-26

## 2023-02-01 RX ORDER — POLYETHYLENE GLYCOL 3350 17 G/17G
1 POWDER, FOR SOLUTION ORAL DAILY
Qty: 578 G | Refills: 3 | Status: SHIPPED | OUTPATIENT
Start: 2023-02-01 | End: 2023-06-19

## 2023-02-01 RX ORDER — BISACODYL 5 MG/1
5 TABLET, DELAYED RELEASE ORAL EVERY OTHER DAY
Qty: 16 TABLET | Refills: 3 | Status: SHIPPED | OUTPATIENT
Start: 2023-02-01 | End: 2023-06-07

## 2023-02-01 NOTE — PROGRESS NOTES
"GASTROENTEROLOGY NEW PATIENT / RETURN VIDEO VISIT      How would you like to obtain your AVS? MyChart  If the video visit is dropped, the invitation should be resent by: Text to cell phone: [phone]  Will anyone else be joining your video visit? No  ..    Video-Visit Details    Type of service:  Video Visit    Video Start Time (time video started): 1037    Video End Time (time video stopped): 1109    Originating Location (pt. Location): Home        Distant Location (provider location):  Off-site    Mode of Communication:  Video Conference via [employer]    Physician has received verbal consent for a Video Visit from the patient? Yes    Gastroenterology NEW PATIENT / RETURN VIDEO VISIT    CC/REFERRING PROVIDER: Gisselle Linn  REASON FOR CONSULTATION: acid reflux and constipation    HPI: 47 year old female with PMH of chronic back pain, opioid dependency (in remission), anxiety, and depression presenting to GI clinic for evaluation and management of persistent acid reflux  and chronic constipation.  Acid reflux: Occurs every day, worse after consumption of certain foods and bending over.  Has been going on for many years. Reported nocturnal symptoms.  Complains of \"vomit burps\" with acidic content, that provokes cough.  Stated that she sleeps in a recliner due to cough and acid reflux.  Patient is taking 40 mg of omeprazole every day, but stated that medication has not being covered by insurance and she has to buy it over-the-counter.  Stated that she spends approximately $700 a year on omeprazole.  She also takes famotidine as needed and over-the-counter antiacid chews.  Constipation: Ongoing issue as well.  Reported having bowel movements once or twice a week on average.  Complains of episodes of severe constipation with associated abdominal bloating, pressure, and pain.  At times, takes enema to promote evacuation of stool.  Has been using lactulose as needed.  Complains of dizziness, diaphoresis, and " weakness when passing large stool.  Denies red blood per rectum or black stools.  Admits to significant weight loss.  Last bowel movement was approximately 1 week ago, but patient had a few small hard stools over the past few days.  Patient attempted to complete colonoscopy about 3 weeks ago, but her bowel prep was not adequate.  Patient claims that she followed bowel prep instructions closely. On her last CT scan in July 2021, there is moderate amount of stool within her colon.  She also had an EGD on 1/6/2023, which showed diffuse mild erythema of the stomach.  Patient has history of cholecystectomy.  Patient complains of lumps or protrusion in her abdomen and umbilicus and epigastric region, when one of her cholecystectomy incisions is located.     Patient takes Seroquel, Xanax, Suboxone, duloxetine, and Ambien-all of which could contribute to constipation issue.    ROS: 10pt ROS performed and otherwise negative.    PAST MEDICAL HISTORY:  Past Medical History:   Diagnosis Date     Abdominal pain, right lower quadrant 03/09/2008    Admit. Discharged 03/10/08     Anxiety attack 07/31/2015     Atypical chest pain 06/23/2015     De Quervain's disease (tenosynovitis)      Dehydration      Gastric ulcer 07/31/2015     GERD (gastroesophageal reflux disease) 07/28/2010    Takes omeprazole and metoclopramide      Ingrowing nail 01/09/2014     Migraines      Opioid dependence in remission (H) 08/02/2015     Other and unspecified ovarian cyst      Papanicolaou smear of cervix with low grade squamous intraepithelial lesion (LGSIL) 07/07/2017       PREVIOUS ABDOMINAL/GYNECOLOGIC SURGERIES:  Past Surgical History:   Procedure Laterality Date     BIOPSY CERVICAL, LOCAL EXCISION, SINGLE/MULTIPLE N/A 8/10/2017    Procedure: BIOPSY CERVICAL, LOCAL EXCISION, SINGLE/MULTIPLE;;  Surgeon: Michael Chandler MD;  Location: [facility]     COLONOSCOPY N/A 1/6/2023    Procedure: COLONOSCOPY;  Surgeon: Michael Dozier MD;  Location:  [facility]     COLPOSCOPY, BIOPSY, COMBINED N/A 8/10/2017    Procedure: COMBINED COLPOSCOPY, BIOPSY;  Colposcopy with Cervical Biopsies and Endometrial Biopsy, Exam with Ultrasound;  Surgeon: Michael Chandler MD;  Location: [facility]     ESOPHAGOSCOPY, GASTROSCOPY, DUODENOSCOPY (EGD), COMBINED N/A 4/17/2017    Procedure: COMBINED ESOPHAGOSCOPY, GASTROSCOPY, DUODENOSCOPY (EGD);  Surgeon: Ibrahima Esposito MD;  Location: [facility]     ESOPHAGOSCOPY, GASTROSCOPY, DUODENOSCOPY (EGD), COMBINED N/A 1/6/2023    Procedure: ESOPHAGOGASTRODUODENOSCOPY, WITH BIOPSY;  Surgeon: Michael Dozier MD;  [facility]     EXAM UNDER ANESTHESIA PELVIC N/A 8/10/2017    Procedure: EXAM UNDER ANESTHESIA PELVIC;;  Surgeon: Michael Chandler MD;  [facility]     HYSTERECTOMY       HYSTERECTOMY, PAP NO LONGER INDICATED       INJECT EPIDURAL LUMBAR Bilateral 7/1/2022    Procedure: Lumbar 5-Sacral 1 Transforaminal Epidural Steroid Injection with fluoroscopic guidance and contrast, bilateral;  Surgeon: Trevor Ivory MD;  Location: [facility]     LAPAROSCOPIC CHOLECYSTECTOMY N/A 2/2/2018    Procedure: LAPAROSCOPIC CHOLECYSTECTOMY;  Laparoscopic Cholecystectomy;  Surgeon: Tigre Lowry DO;  [facility]     LAPAROSCOPIC HYSTERECTOMY TOTAL N/A 10/30/2017    Procedure: LAPAROSCOPIC HYSTERECTOMY TOTAL;  LAPAROSCOPIC HYSTERECTOMY TOTAL POSSIBLE SALPINGO-OOPHERECTOMY (BILATERAL);  Surgeon: Michael Chandler MD;  Location: [facility]     [facility]HC UGI ENDOSCOPY, SIMPLE EXAM  01/07/08         PERTINENT MEDICATIONS:  Current Outpatient Medications   Medication Sig Dispense Refill     acetaminophen (TYLENOL) 500 MG tablet Take 1,000 mg by mouth every 6 hours as needed for mild pain       ALPRAZolam (XANAX) 1 MG tablet Take 1 tablet (1 mg) by mouth 2 times daily 60 tablet 0     bisacodyl (DULCOLAX) 5 MG EC tablet 2 days prior to procedure, take 2 tablets at 4 pm. 1 day prior to procedure, take 2 tablets at 4 pm. For additional  instructions refer to your colonoscopy prep instructions. 4 tablet 0     buprenorphine HCl-naloxone HCl (SUBOXONE) 2-0.5 MG per film Place 0.5 Film under the tongue daily        cetirizine (ZYRTEC) 10 MG tablet TAKE ONE TABLET BY MOUTH EVERY MORNING 90 tablet 3     cyclobenzaprine (FLEXERIL) 5 MG tablet TAKE ONE TO TWO TABLETS BY MOUTH THREE TIMES A DAY AS NEEDED FOR MUSCLE SPASMS 90 tablet 5     DULoxetine (CYMBALTA) 30 MG capsule Take 1 capsule (30 mg) by mouth 2 times daily 180 capsule 0     famotidine (PEPCID) 40 MG tablet Take 1 tablet (40 mg) by mouth daily 30 tablet 11     lactulose (CHRONULAC) 10 GM/15ML solution Take 10 g by mouth every 6 hours as needed for constipation       Melatonin 10 MG TABS tablet Take 10 mg by mouth nightly as needed for sleep       omeprazole (PRILOSEC) 40 MG DR capsule TAKE ONE CAPSULE BY MOUTH ONCE DAILY (Patient taking differently: Take 40 mg by mouth daily) 30 capsule 11     ondansetron (ZOFRAN ODT) 4 MG ODT tab DISSOLVE 1 TABLET ON TONGUE EVERY 6 HOURS AS NEEDED FOR NAUSEA 20 tablet 0     polyethylene glycol (GOLYTELY) 236 g suspension 2 days prior at 5pm, mix and drink half of a jug of Golytely. Drink an 8 oz. glass of Golytely every 15 minutes until half of the jug is gone. Place remainder of Golytely in the refrigerator. 1 day prior at 5 pm, drink the 2nd half of a jug of Golytely bowel prep. 6 hours before your check-in time, drink an 8 oz. glass of Golytely every 15 minutes until half of the 2nd jug of Golytely is gone. Discard remainder of second jug. 8000 mL 0     QUEtiapine (SEROQUEL) 200 MG tablet TAKE 1 TABLET (200 MG) BY MOUTH AT BEDTIME 90 tablet 3     rOPINIRole (REQUIP) 1 MG tablet TAKE ONE TABLET BY MOUTH EVERY NIGHT AT BEDTIME 90 tablet 0     simvastatin (ZOCOR) 10 MG tablet TAKE ONE TABLET BY MOUTH EVERY NIGHT AT BEDTIME 90 tablet 3     Turmeric 500 MG CAPS        zolpidem (AMBIEN) 5 MG tablet TAKE ONE TABLET (5 MG) BY MOUTH NIGHTLY AS NEEDED FOR SLEEP 30  tablet 0     No other OTC/herbal/supplements reported by patient.    SOCIAL HISTORY:  Social History     Socioeconomic History     Marital status: Single     Spouse name: Not on file     Number of children: Not on file     Years of education: Not on file     Highest education level: Not on file   Occupational History     Not on file   Tobacco Use     Smoking status: Every Day     Packs/day: 0.25     Years: 20.00     Pack years: 5.00     Types: Cigarettes     Smokeless tobacco: Never   Vaping Use     Vaping Use: Some days     Substances: Nicotine, CBD     Devices: Refillable tank   Substance and Sexual Activity     Alcohol use: Yes     Comment: occasional drinks; about 5-6 beers/week     Drug use: No     Sexual activity: Yes     Partners: Male     Birth control/protection: Female Surgical   Other Topics Concern     Parent/sibling w/ CABG, MI or angioplasty before 65F 55M? No   Social History Narrative     Not on file     Social Determinants of Health     Financial Resource Strain: Not on file   Food Insecurity: Not on file   Transportation Needs: Not on file   Physical Activity: Not on file   Stress: Not on file   Social Connections: Not on file   Intimate Partner Violence: Not on file   Housing Stability: Not on file       FAMILY HISTORY:  Denies colon/panc/esophageal/other GI CA, no other Anderson or other HPS-related [place]. No IBD/celiac, no other AI/liver/thyroid disease.  Colon polyps in mother and maternal grandmother, who had colon resection for unknown reason.    Family History   Problem Relation Age of Onset     Depression Mother      Respiratory Mother      Chronic Obstructive Pulmonary Disease Mother      Cerebrovascular Disease Father         Brain anyeurism     Breast Cancer Cousin      Adrenal Disorder Other      Chronic Obstructive Pulmonary Disease Other        PHYSICAL EXAMINATION:  Vitals reviewed  LMP  (LMP Unknown)     General: Patient appears well in no acute distress.   Skin: No visualized rash or  "lesions on visualized skin  Eyes: EOMI, no erythema, sclera icterus or discharge noted  Resp: breathing comfortably without accessory muscle usage, speaking in full sentences, no cough  MSK: Appears to have normal range of motion based on visualized movements  Neurologic: No apparent tremors, facial movements symmetric  Psych: affect normal, alert and oriented      PERTINENT STUDIES Reviewed in EMR    ASSESSMENT/PLAN:  47 year old female  presented to GI clinic for evaluation and management of chronic GERD symptoms and constipation.  Ongoing issues for many years without significant changes.  Patient has been on omeprazole for many years but believes it is no longer working.  She takes famotidine as needed along with over-the-counter antiacid calcium chews.  She is not on any bowel regiment for constipation management.  She reported having bowel movement once or twice a week.  At times, she has large hard stools with associated dizziness, lightheadedness, and abdominal pain.  Hard stools are followed by explosive diarrhea.  Reported noticing \"lumps\" on abdomen, wondering if she could have hernia.  I reviewed the patient's recent endoscopy report.  EGD was suggestive for diffuse mild erythema of the stomach.  Pathology report confirmed mild inactive chronic gastritis.  H. pylori was negative.  Inconclusive colonoscopy findings due to poor bowel prep.  Recommended to repeat the study in 3 to 5 years given the family history of colonic polyps in mother and maternal grandmother.  We discussed the etiology and management of her symptoms.I believe that the opiate and anti-anxiety medications,diet,and low activity level play significant role in patient's constipation.  I recommended the following:  - Proceed with abdominal CT scan for further evaluation of soft tissue masses reported by the patient.  Suspect either abdominal wall hernia or lipoma.  - Strongly recommended to optimize constipation management by taking " maintenance laxative every day.  Recommended MiraLAX 17 g once or twice a day .  Bisacodyl 5 to 10 mg every other day.  Take lactulose and enema as needed only.  If problems with constipation persist on the next follow-up visit, might initiate Linzess or Amitiza.   - We will switch from omeprazole to pantoprazole 40 mg a day.  Will add sucralfate 1 g 3 times a day for 4 weeks. Take famotidine 40 mg before bed every night instead of as needed.  Patient was educated on lifestyle and diet modification to improve her GERD symptoms (see AVS).  .    ICD-10-CM    1. Abdominal mass, unspecified abdominal location  R19.00 CT Abdomen Pelvis w Contrast      2. Constipation due to opioid therapy  K59.03 Adult GI  Referral - Consult Only    T40.2X5A CT Abdomen Pelvis w Contrast     polyethylene glycol (MIRALAX) 17 GM/Dose powder     bisacodyl (DULCOLAX) 5 MG EC tablet      3. Gastroesophageal reflux disease with esophagitis without hemorrhage  K21.00 Adult GI  Referral - Consult Only     pantoprazole (PROTONIX) 40 MG EC tablet     sucralfate (CARAFATE) 1 GM tablet        RTC in 3 months    Thank you for this consultation. It was a pleasure to participate in the care of this patient; please contact us with any further questions.    JEANNIE Ponce, SUDHIRP-C  [facility]  Gastroenterology Department  [place], [place]    This note was created with Dragon voice recognition software, and while reviewed for accuracy, inadvertent minor typographic errors may occur. Please contact the provider if you have any questions.

## 2023-02-01 NOTE — PATIENT INSTRUCTIONS
"It was a pleasure taking care of you today.  I've included a brief summary of our discussion and care plan from today's visit below.  Please review this information with your primary care provider.  ______________________________________________________________________    My recommendations are summarized as follows:    Stop omeprazole. I ordered pantoprazole 40 mg a day for acid reflux to take 30-60 minutes before breakfast. Please avoid consumption of foods that cause acid reflux- tomato based products, spicy foods, caffeine, alcohol, chocolate, onion, garlic, and peppermint.    2. Carafate was ordered to take as an additional medication for acid reflux and gastritis treatment for one month. OK to take the Antiacid chews as needed but keep in mind that calcium can make your constipation worse.    3. Please review the constipation management plan below. I placed orders for Miralax to take every day and bisacodyl every other day. Will re-evaluate effectiveness of the plan on the next follow up visit. May switch to Linzess or Amitiza if no significant improvement in constipation. They work well for opiate induced constipation.    4. I placed an order for CT scan of abdomen for evaluation of abdominal lumps that you reported. It could be hernia or soft tissue mass (such as lipoma).    Return to GI Clinic in 3 months to review your progress.    ______________________________________________________________________                                             Gastroesophageal reflux  Gastroesophageal reflux, also called \"acid reflux,\" occurs when the stomach contents back up into the esophagus and/or mouth. Occasional reflux is normal and can happen in healthy infants, children, and adults, most often after eating a large meal. Most episodes are brief and do not cause bothersome symptoms or complications.   By contrast, people with gastroesophageal reflux disease (GERD) experience bothersome symptoms or damage to the " esophagus as a result of acid reflux. Symptoms of GERD can include heartburn, regurgitation, and difficulty or pain with swallowing.     Lifestyle modifications for gastroesophageal reflux disease (GERD).   1. Change your eating habits.  -- It's best to eat several small meals instead of two or three large meals.  -- After you eat, wait 2 to 3 hours before you lie down. Late-night snacks aren't a good idea.   -- Chocolate, mint, and alcohol can make GERD worse. They relax the valve between the esophagus and the stomach.  -- Spicy foods, foods that have a lot of acid (like tomatoes and oranges), and coffee can make GERD symptoms worse in some people. If your symptoms are worse after you eat a certain food, you may want to stop eating that food to see if your symptoms get better.    2. Do not smoke or chew tobacco. Saliva helps to neutralize refluxed acid, and smoking reduces the amount of saliva in the mouth and throat. Smoking also lowers the pressure in the lower esophageal sphincter and provokes coughing, causing frequent episodes of acid reflux in the esophagus.     3. If you have GERD symptoms at night, raise the head of your bed 6 in. (15 cm) to 8 in. (20 cm) by putting the frame on blocks or placing a foam wedge under the head of your mattress. (Adding extra pillows does not work.)  4. Avoid or reduce pressure on your stomach. Don't wear tight clothing around your middle.  5. Lose weight if you need to. Losing just 5 to 10 pounds can help.       Constipation refers to a change in bowel habits, but it has varied meanings. Stools may be too hard or too small, difficult to pass, or infrequent (less than three times per week). People with constipation may also notice a frequent need to strain and a sense that the bowels are not empty.  Constipation is a very common problem. Each year more than 2.5 million Americans visit their healthcare provider for relief from this problem. Many factors can contribute to or  cause constipation, although in most people, no single cause can be found. In general, constipation occurs more frequently as you get older.     Treatment for constipation includes changing some behaviors, eating foods high in fiber, and using medications if needed.  Behavior changes -- The bowels are most active following meals, and this is often the time when stools will pass most readily. If you ignore your body's signals to have a bowel movement, the signals become weaker and weaker over time.By paying close attention to these signals, you may have an easier time moving your bowels. Drinking a caffeine-containing beverage in the morning may also be helpful.  Increase fiber -- Increasing fiber in your diet may reduce or eliminate constipation. The recommended amount of dietary fiber is 20 to 35 grams of fiber per day. By reading the product information panel on the side of the package, you can determine the number of grams of fiber per serving .Many fruits and vegetables can be particularly helpful in preventing and treating constipation .This is especially true of citrus fruits, prunes, and prune juice. Some breakfast cereals are also an excellent source of dietary fiber.  Start your day off with a high-fiber breakfast such as whole grain oatmeal sprinkled with  raisins, blueberries, pecans or macadamia nuts.   Slice up raw veggies (carrots, celery, bell peppers) and fruit (apples, pears) and keep  them in baggies for quick high-fiber snacks.   Munch on a small handful of nuts, such as peanuts, walnuts, and almonds.   Look for individually wrapped prunes in the grocery store for a quick fiber snack.   When buying frozen veggies, look for ones that have been  flash frozen.    Add half a cup of garbanzo or black beans or peas to your salad to add fiber, texture, and flavor.   EXAMPLES OF HIGH-FIBER FOODS   1 large apple or pear (5g)   1 cup Raisin Bran (5g)     cup raspberries (9g)   1 cup cooked broccoli (5g)    23 almonds (3.5g)   1 cup black beans (15g)   2 brazil nuts (2.5g)   1 cup peas (16g)    Anti-constipation fruit paste:  Ingredients  1 cup pitted prunes  1 cup raisins  1 cup pitted dates  1/2 cup orange juice  2/3 cup prune juice  Tip:  For even more fiber add 1 cup of natural wheat bran to the fruit mixture.  Directions (Makes 25 servings)  Combine all ingredients in a bowl and soak overnight in the refrigerator. Blend in  or  until smooth. Keep in the refrigerator (can be kept frozen in small containers).   Serving size: 2 tablespoons. Spread it on toast or eat from a spoon.  Caution: May cause bloating and abdominal cramping. Start with smaller portion and increase it gradually over a few weeks as tolerate.    Medications:  Bulk forming -- These include natural fiber and commercial fiber preparations such as Psyllium (Konsyl; Metamucil; Perdiem), Methylcellulose (Citrucel),  or Wheat dextrin (Benefiber);  You should increase the dose of fiber supplements slowly to prevent gas and cramping, and you should always take the supplement with plenty of fluid.  2. Hyperosmolar medications  -- such as   ?Polyethylene glycol (MiraLAX, GlycoLax)  ? Lactulose  ? Sorbitol  Polyethylene glycol is generally preferred since it does not cause gas or bloating and is available without a prescription. Lactulose and sorbitol can produce gas and bloating. Sorbitol works as well as lactulose and is much less expensive.  3.Saline laxatives -- such as Milk of Magnesia and magnesium citrate (Evac-Q-Mag) act similarly to the hyperosmolar drugs.  4. Stimulant drugs -- include Senna (eg, Black Draught, Ex-Lax, Senokot) and Bisacodil (eg, Correctol, Doxidan, Dulcolax).     Constipation treatments to avoid:  ? Emollients - Emollient laxatives, principally mineral oil, soften stools by moisturizing them. However, other treatments have fewer risks and equal benefit.  ? Natural products - A wide variety of natural products  are advertised for constipation. Some of them contain the active ingredients found in commercially available laxatives. However, their dose and purity may not be carefully controlled. Thus, these products are not generally recommended.  ? A variety of home-made enema preparations have been used throughout the years, such as soapsuds, hydrogen peroxide, and household detergents. These can be extremely irritating to the lining of the intestine and should be avoided.      Goal: Improvement in stool frequency to reduce occurrence of straining, bloating, and abdominal discomfort.    Daily constipation plan:  -- Miralax: Miralax 1 capful daily, titrating to goal, up to 3 capfuls daily. If you have not had a bowel movement for 2-3 days, or if you feel that you straining, incompletely evacuating, try to augment your daily plan by increasing Miralax dose, up to 3 capfuls daily, until stooling as resumed, and then return to previous dosing.  -- Increase dietary fiber as able to tolerate, with goal 20-30 grams daily. Prunes can be a great source of fiber and can be used daily to help with bowel movements. If certain foods are difficult for you to tolerate, consider meeting with our nutrition team to help you.  -- Fiber: After using Miralax for about 2 weeks, you can add in soluble fiber supplement (such as Citrucel, Metamucil, psyllium husk). Start with 1/2 tablespoon in 8 oz water daily, increase slowly to effect. A good goal is 1 tablespoon daily, with a maximum of 3 tablespoons daily. This helps with stool consistency.  - Try to stay active with at least 150 minutes of moderate intensity activity per week. This can include brisk walking, active gardening, cycling, yoga, pilates, etc.   - Try to stay hydrated, goal 48-64 oz of non-caffeinated fluid daily    Constipation back-up plan: If you have not had a bowel movement for 2-3 days, or if you feel that you straining, incompletely evacuating, plan to augment your daily plan  by:  - Miralax: Increasing Miralax to up to 3 capfuls daily, returning to daily maintenance dose above, once stools have resumed  - Magnesium:  Add in magnesium oxide 400 mg once to twice daily  - Products containing senna (ie Senakot, SmoothMove tea) can be used over a short period of time.    Emergency back-up plan: If you continue to feel constipated and need more help:  -- If it has been 3 days without a BM, consider taking 1 bottle of magnesium citrate and/or 1 glycerin suppository (insert rounded end, not pointed end, first). OK to take Bisacodyl tablets instead ( 10-15 mg dose).  - If this is not effective, please call us.    ______________________________________________________________________    Who do I call with any questions after my visit?  Please be in touch if there are any further questions that arise following today's visit.  There are multiple ways to contact your gastroenterology care team.      During business hours, you may reach a Gastroenterology nurse at [phone], option 3.     To schedule or reschedule an appointment, please call [phone].   To schedule your lab work at [facility], please call [phone]    You can always send a secure message through [employer].  [employer] messages are answered by your nurse or doctor typically within 24 hours.  Please allow extra time on weekends and holidays.      For urgent/emergent questions after business hours, you may reach the on-call GI Fellow by contacting the [facility]  at [phone].    In order for your refill to be processed in a timely fashion, it is your responsibility to ensure you follow the recommendations from your provider regarding your laboratory studies and follow up appointments.       How will I get the results of any tests ordered?    You will receive all of your results.  If you have signed up for [employer], any tests ordered at your visit will be available to you after your  physician reviews them.  Typically this takes 1-2 weeks.  If there are urgent results that require a change in your care plan, your physician or nurse will call you to discuss the next steps.   What is Wow[employer]?  [employer] is a secure way for you to access all of your healthcare records from the [facility].  It is a web based computer program, so you can sign on to it from any location.  It also allows you to send secure messages to your care team.  I recommend signing up for [employer] access if you have not already done so and are comfortable with using a computer.    How to I schedule a follow-up visit?  If you did not schedule a follow-up visit today, please call [phone] to schedule a follow-up office visit.      Sincerely,  CHAPIN Ponce,  [facility],  Division of Gastroenterology   ([facility])

## 2023-02-02 RX ORDER — ZOLPIDEM TARTRATE 5 MG/1
TABLET ORAL
Qty: 30 TABLET | Refills: 0 | Status: SHIPPED | OUTPATIENT
Start: 2023-02-02 | End: 2023-03-01

## 2023-02-07 ENCOUNTER — VIRTUAL VISIT (OUTPATIENT)
Dept: BEHAVIORAL HEALTH | Facility: CLINIC | Age: 48
End: 2023-02-07
Payer: COMMERCIAL

## 2023-02-07 DIAGNOSIS — F41.1 GAD (GENERALIZED ANXIETY DISORDER): Primary | ICD-10-CM

## 2023-02-07 PROCEDURE — 90834 PSYTX W PT 45 MINUTES: CPT | Mod: 95 | Performed by: SOCIAL WORKER

## 2023-02-07 ASSESSMENT — PATIENT HEALTH QUESTIONNAIRE - PHQ9
SUM OF ALL RESPONSES TO PHQ QUESTIONS 1-9: 18
10. IF YOU CHECKED OFF ANY PROBLEMS, HOW DIFFICULT HAVE THESE PROBLEMS MADE IT FOR YOU TO DO YOUR WORK, TAKE CARE OF THINGS AT HOME, OR GET ALONG WITH OTHER PEOPLE: EXTREMELY DIFFICULT
SUM OF ALL RESPONSES TO PHQ QUESTIONS 1-9: 18

## 2023-02-07 NOTE — PROGRESS NOTES
[facility]                                     Progress Note    Patient Name: Radha Beckwith  Date: 2/7/23         Service Type: Individual      Session Start Time: 1300  Session End Time: 1352     Session Length: 52    Session #: 21    Attendees: Client    Service Modality:  Video Visit:      Provider verified identity through the following two step process.  Patient provided:  Patient is known previously to provider    Telemedicine Visit: The patient's condition can be safely assessed and treated via synchronous audio and visual telemedicine encounter.      Reason for Telemedicine Visit: Patient has requested telehealth visit    Originating Site (Patient Location): Patient's home    Distant Site (Provider Location): Provider Remote Setting- Home Office    Consent:  The patient/guardian has verbally consented to: the potential risks and benefits of telemedicine (video visit) versus in person care; bill my insurance or make self-payment for services provided; and responsibility for payment of non-covered services.     Patient would like the video invitation sent by:  Send to e-mail at: [email or web address]    Mode of Communication:  Video Conference via [facility]    Distant Location (Provider):  Off-site    As the provider I attest to compliance with applicable laws and regulations related to telemedicine.    DATA  Interactive Complexity: No  Crisis: No        Progress Since Last Session (Related to Symptoms / Goals / Homework):   Symptoms: Currently has symptoms, sore throat, muscle aches,     Homework: Achieved / completed to satisfaction      Episode of Care Goals: Satisfactory progress - PREPARATION (Decided to change - considering how); Intervened by negotiating a change plan and determining options / strategies for behavior change, identifying triggers, exploring social supports, and working towards setting a date to begin behavior change     Current / Ongoing Stressors and Concerns:    Currently has Covid. Feeling fatigued.      Treatment Objective(s) Addressed in This Session:   During today's session briefly spoke about the impact her physical health is played into her mental health.  Is trying to maintain optimism although currently is experiencing COVID.  In the process of putting a timeline together     Intervention:     CBT, solution focused therapy              Time -line of her life    Assessments completed prior to visit:  The following assessments were completed by patient for this visit:  PHQ9:   PHQ-9 SCORE 6/27/2022 8/30/2022 10/4/2022 11/8/2022 12/22/2022 1/1/2023 2/7/2023   PHQ-9 Total Score MyChart 12 (Moderate depression) 16 (Moderately severe depression) 18 (Moderately severe depression) 18 (Moderately severe depression) 18 (Moderately severe depression) 18 (Moderately severe depression) 18 (Moderately severe depression)   PHQ-9 Total Score 12 16 18 18 18 18 18     GAD7:   BO-7 SCORE 7/9/2021 12/2/2021 6/27/2022 8/30/2022 10/26/2022 12/4/2022 1/1/2023   Total Score 11 (moderate anxiety) 17 (severe anxiety) 12 (moderate anxiety) 16 (severe anxiety) 16 (severe anxiety) 19 (severe anxiety) 20 (severe anxiety)   Total Score 11 17 12 16 16 19 20     PROMIS 10-Global Health (all questions and answers displayed):   PROMIS 10 10/26/2022 1/25/2023 2/7/2023   In general, would you say your health is: Fair Poor Poor   In general, would you say your quality of life is: Poor Poor Poor   In general, how would you rate your physical health? Poor Poor Poor   In general, how would you rate your mental health, including your mood and your ability to think? Fair Fair Poor   In general, how would you rate your satisfaction with your social activities and relationships? Poor Fair Poor   In general, please rate how well you carry out your usual social activities and roles Poor Fair Poor   To what extent are you able to carry out your everyday physical activities such as walking, climbing stairs,  carrying groceries, or moving a chair? A little A little A little   How often have you been bothered by emotional problems such as feeling anxious, depressed or irritable? Always Always Always   How would you rate your fatigue on average? Moderate Severe Severe   How would you rate your pain on average?   0 = No Pain  to  10 = Worst Imaginable Pain 8 8 9   In general, would you say your health is: 2 1 1   In general, would you say your quality of life is: 1 1 1   In general, how would you rate your physical health? 1 1 1   In general, how would you rate your mental health, including your mood and your ability to think? 2 2 1   In general, how would you rate your satisfaction with your social activities and relationships? 1 2 1   In general, please rate how well you carry out your usual social activities and roles. (This includes activities at home, at work and in your community, and responsibilities as a parent, child, spouse, employee, friend, etc.) 1 2 1   To what extent are you able to carry out your everyday physical activities such as walking, climbing stairs, carrying groceries, or moving a chair? 2 2 2   In the past 7 days, how often have you been bothered by emotional problems such as feeling anxious, depressed, or irritable? 5 5 5   In the past 7 days, how would you rate your fatigue on average? 3 4 4   In the past 7 days, how would you rate your pain on average, where 0 means no pain, and 10 means worst imaginable pain? 8 8 9   Global Mental Health Score 5 6 4   Global Physical Health Score 8 7 7   PROMIS TOTAL - SUBSCORES 13 13 11   Some recent data might be hidden         ASSESSMENT: Current Emotional / Mental Status (status of significant symptoms):   Risk status (Self / Other harm or suicidal ideation)   Patient denies current fears or concerns for personal safety.   Patient denies current or recent suicidal ideation or behaviors.   Patient denies current or recent homicidal ideation or  behaviors.   Patient denies current or recent self injurious behavior or ideation.   Patient denies other safety concerns.   Patient reports there has been no change in risk factors since their last session.     Patient reports there has been no change in protective factors since their last session.     Recommended that patient call 911 or go to the local ED should there be a change in any of these risk factors.     Appearance:   Appropriate    Eye Contact:   Good    Psychomotor Behavior: Normal    Attitude:   Cooperative    Orientation:   All   Speech    Rate / Production: Normal     Volume:  Normal    Mood:    Normal   Affect:    Appropriate    Thought Content:  Clear    Thought Form:  Coherent  Logical    Insight:    Good      Medication Review:   No changes to current psychiatric medication(s)     Medication Compliance:   Yes     Changes in Health Issues:   Yes.. current patrick issues.      Chemical Use Review:   Substance Use: Chemical use reviewed, no active concerns identified      Tobacco Use: No current tobacco use.      Diagnosis:  Generalized anxiety disorder    Collateral Reports Completed:   Routed note to PCP    PLAN: (Patient Tasks / Therapist Tasks / Other)    . Continued self care strategies.  2. Continue current medication regime.   3. Stop thought processing techniques.  4. Pt. will speak will her Md. regarding Lyrica   5. Recc. The Book (How did I get Here?         Swetha Weinberg LICSW  2/7/23                                                     __________________________________________________________________________________  _Individual Treatment Plan     Patient's Name: Radha Beckwith                    YOB: 1975     Date of Creation: 12/5/22  Date Treatment Plan Last Reviewed/Revised:      DSM5 Diagnoses: Generalized anxiety disorder  Psychosocial / Contextual Factors: Current stressors with holiday season coming up and medical concerns.  PROMIS (reviewed every 90 days):       Referral / Collaboration:  Referral to another professional/service is not indicated at this time..     Anticipated number of session for this episode of care: 6-9 sessions  Anticipation frequency of session: Biweekly  Anticipated Duration of each session: 38-52 minutes  Treatment plan will be reviewed in 90 days or when goals have been changed.         MeasurableTreatment Goal(s) related to diagnosis / functional impairment(s)  MeasurableTreatment Goal(s) related to diagnosis / functional impairment(s)  Goal 1: Patient will focus on recognizing and managing symptoms of anxiety as evidenced by a BO-7 score of 5 or less    I will know I've met my goal when I do not have to call and reach out for extra support from the clinic.       Objective #A (Patient Action)                          Patient will practice deep breathing at least 3-5 a day.  Status: New - Date 12/5/22     Objective #B  Patient will attend and participate in social or recreational activities At least 3 times per week.  Status: New -  Date 12/5/22        Objective #C  Patient will use at least 5 coping skills for anxiety management in the next 12 weeks.  Status: New  - Date 12/5/22        Objective #D                Patient will use thought-stopping strategy daily to reduce intrusive thoughts.  Status: New - Date: 12/5/22     Objective #E  Patient will use distraction each time intrusive worry surfaces.                       Status: New - Date: 12/5/22     Objective #F  Patient will Improve quantity and quality of night time sleep / decrease daytime naps.  Status: New - Date: New 12/5/22      Intervention(s)  Therapist will teach CBT skills to challenge cognitive distortions and core beliefs.  Therapist will teach and model positive self-talk behaviors.  Therapist will use psychodynamic approaches to explore early attachments and schemas.  Therapist will teach DBT mindfulness and emotion regulation skills.                Patient has reviewed and  agreed to the above plan.     Swetha Weinberg [facility]                December 5, 2022     Answers for HPI/ROS submitted by the patient on 12/4/2022  BO 7 TOTAL SCORE: 19     Answers for HPI/ROS submitted by the patient on 12/22/2022  If you checked off any problems, how difficult have these problems made it for you to do your work, take care of things at home, or get along with other people?: Extremely difficult  PHQ9 TOTAL SCORE: 18    Kyaw Weinberg, [facility]  February 7, 2023    Answers for HPI/ROS submitted by the patient on 2/7/2023  If you checked off any problems, how difficult have these problems made it for you to do your work, take care of things at home, or get along with other people?: Extremely difficult  PHQ9 TOTAL SCORE: 18

## 2023-02-13 DIAGNOSIS — R11.0 NAUSEA: ICD-10-CM

## 2023-02-13 DIAGNOSIS — F41.9 ANXIETY: ICD-10-CM

## 2023-02-14 ENCOUNTER — VIRTUAL VISIT (OUTPATIENT)
Dept: BEHAVIORAL HEALTH | Facility: CLINIC | Age: 48
End: 2023-02-14
Payer: COMMERCIAL

## 2023-02-14 DIAGNOSIS — F41.1 GAD (GENERALIZED ANXIETY DISORDER): Primary | ICD-10-CM

## 2023-02-14 PROCEDURE — 90834 PSYTX W PT 45 MINUTES: CPT | Mod: VID | Performed by: SOCIAL WORKER

## 2023-02-14 RX ORDER — ONDANSETRON 4 MG/1
TABLET, ORALLY DISINTEGRATING ORAL
Qty: 20 TABLET | Refills: 1 | Status: SHIPPED | OUTPATIENT
Start: 2023-02-14 | End: 2023-05-19

## 2023-02-14 RX ORDER — ALPRAZOLAM 1 MG
TABLET ORAL
Qty: 60 TABLET | Refills: 0 | Status: SHIPPED | OUTPATIENT
Start: 2023-02-14 | End: 2023-03-14

## 2023-02-14 ASSESSMENT — ANXIETY QUESTIONNAIRES
IF YOU CHECKED OFF ANY PROBLEMS ON THIS QUESTIONNAIRE, HOW DIFFICULT HAVE THESE PROBLEMS MADE IT FOR YOU TO DO YOUR WORK, TAKE CARE OF THINGS AT HOME, OR GET ALONG WITH OTHER PEOPLE: EXTREMELY DIFFICULT
1. FEELING NERVOUS, ANXIOUS, OR ON EDGE: NEARLY EVERY DAY
IF YOU CHECKED OFF ANY PROBLEMS ON THIS QUESTIONNAIRE, HOW DIFFICULT HAVE THESE PROBLEMS MADE IT FOR YOU TO DO YOUR WORK, TAKE CARE OF THINGS AT HOME, OR GET ALONG WITH OTHER PEOPLE: EXTREMELY DIFFICULT
7. FEELING AFRAID AS IF SOMETHING AWFUL MIGHT HAPPEN: NEARLY EVERY DAY
GAD7 TOTAL SCORE: 17
7. FEELING AFRAID AS IF SOMETHING AWFUL MIGHT HAPPEN: NEARLY EVERY DAY
6. BECOMING EASILY ANNOYED OR IRRITABLE: NEARLY EVERY DAY
4. TROUBLE RELAXING: SEVERAL DAYS
4. TROUBLE RELAXING: SEVERAL DAYS
2. NOT BEING ABLE TO STOP OR CONTROL WORRYING: NEARLY EVERY DAY
5. BEING SO RESTLESS THAT IT IS HARD TO SIT STILL: SEVERAL DAYS
GAD7 TOTAL SCORE: 17
GAD7 TOTAL SCORE: 17
6. BECOMING EASILY ANNOYED OR IRRITABLE: NEARLY EVERY DAY
3. WORRYING TOO MUCH ABOUT DIFFERENT THINGS: NEARLY EVERY DAY
3. WORRYING TOO MUCH ABOUT DIFFERENT THINGS: NEARLY EVERY DAY
GAD7 TOTAL SCORE: 17
7. FEELING AFRAID AS IF SOMETHING AWFUL MIGHT HAPPEN: NEARLY EVERY DAY
8. IF YOU CHECKED OFF ANY PROBLEMS, HOW DIFFICULT HAVE THESE MADE IT FOR YOU TO DO YOUR WORK, TAKE CARE OF THINGS AT HOME, OR GET ALONG WITH OTHER PEOPLE?: EXTREMELY DIFFICULT
5. BEING SO RESTLESS THAT IT IS HARD TO SIT STILL: SEVERAL DAYS
1. FEELING NERVOUS, ANXIOUS, OR ON EDGE: NEARLY EVERY DAY
2. NOT BEING ABLE TO STOP OR CONTROL WORRYING: NEARLY EVERY DAY

## 2023-02-14 NOTE — PROGRESS NOTES
[facility]                                     Progress Note    Patient Name: Radha Beckwith  Date: 2/14/23         Service Type: Individual      Session Start Time: 1300  Session End Time: 1352     Session Length: 52    Session #: 22    Attendees: Client    Service Modality:  Video Visit:      Provider verified identity through the following two step process.  Patient provided:  Patient is known previously to provider    Telemedicine Visit: The patient's condition can be safely assessed and treated via synchronous audio and visual telemedicine encounter.      Reason for Telemedicine Visit: Patient has requested telehealth visit    Originating Site (Patient Location): Patient's home    Distant Site (Provider Location): Provider Remote Setting- Home Office    Consent:  The patient/guardian has verbally consented to: the potential risks and benefits of telemedicine (video visit) versus in person care; bill my insurance or make self-payment for services provided; and responsibility for payment of non-covered services.     Patient would like the video invitation sent by:  Send to e-mail at: [email or web address]    Mode of Communication:  Video Conference via [facility]    Distant Location (Provider):  Off-site    As the provider I attest to compliance with applicable laws and regulations related to telemedicine.    DATA  Interactive Complexity: No  Crisis: No        Progress Since Last Session (Related to Symptoms / Goals / Homework):   Symptoms: Some improvement    Homework: Partially completed      Episode of Care Goals: Satisfactory progress - CONTEMPLATION (Considering change and yet undecided); Intervened by assessing the negative and positive thinking (ambivalence) about behavior change     Current / Ongoing Stressors and Concerns:   Continued back pain.  Continuing to recover from COVID     Treatment Objective(s) Addressed in This Session:   During this session discussed the dynamics  between she and her mother and how that has impacted her life use.  Patient realizes that she is less social than she was previously.                Intervention:    CBT, solution focused therapy              Time -line of her life    Assessments completed prior to visit:  The following assessments were completed by patient for this visit:  PHQ9:   PHQ-9 SCORE 8/30/2022 10/4/2022 11/8/2022 12/22/2022 1/1/2023 2/7/2023 2/14/2023   PHQ-9 Total Score MyChart 16 (Moderately severe depression) 18 (Moderately severe depression) 18 (Moderately severe depression) 18 (Moderately severe depression) 18 (Moderately severe depression) 18 (Moderately severe depression) 16 (Moderately severe depression)   PHQ-9 Total Score 16 18 18 18 18 18 16     GAD7:   BO-7 SCORE 8/30/2022 10/26/2022 12/4/2022 1/1/2023 2/14/2023 2/14/2023 2/14/2023   Total Score 16 (severe anxiety) 16 (severe anxiety) 19 (severe anxiety) 20 (severe anxiety) - - 17 (severe anxiety)   Total Score 16 16 19 20 17 17 17     PROMIS 10-Global Health (all questions and answers displayed):   PROMIS 10 10/26/2022 1/25/2023 2/7/2023   In general, would you say your health is: Fair Poor Poor   In general, would you say your quality of life is: Poor Poor Poor   In general, how would you rate your physical health? Poor Poor Poor   In general, how would you rate your mental health, including your mood and your ability to think? Fair Fair Poor   In general, how would you rate your satisfaction with your social activities and relationships? Poor Fair Poor   In general, please rate how well you carry out your usual social activities and roles Poor Fair Poor   To what extent are you able to carry out your everyday physical activities such as walking, climbing stairs, carrying groceries, or moving a chair? A little A little A little   How often have you been bothered by emotional problems such as feeling anxious, depressed or irritable? Always Always Always   How would you rate  your fatigue on average? Moderate Severe Severe   How would you rate your pain on average?   0 = No Pain  to  10 = Worst Imaginable Pain 8 8 9   In general, would you say your health is: 2 1 1   In general, would you say your quality of life is: 1 1 1   In general, how would you rate your physical health? 1 1 1   In general, how would you rate your mental health, including your mood and your ability to think? 2 2 1   In general, how would you rate your satisfaction with your social activities and relationships? 1 2 1   In general, please rate how well you carry out your usual social activities and roles. (This includes activities at home, at work and in your community, and responsibilities as a parent, child, spouse, employee, friend, etc.) 1 2 1   To what extent are you able to carry out your everyday physical activities such as walking, climbing stairs, carrying groceries, or moving a chair? 2 2 2   In the past 7 days, how often have you been bothered by emotional problems such as feeling anxious, depressed, or irritable? 5 5 5   In the past 7 days, how would you rate your fatigue on average? 3 4 4   In the past 7 days, how would you rate your pain on average, where 0 means no pain, and 10 means worst imaginable pain? 8 8 9   Global Mental Health Score 5 6 4   Global Physical Health Score 8 7 7   PROMIS TOTAL - SUBSCORES 13 13 11   Some recent data might be hidden         ASSESSMENT: Current Emotional / Mental Status (status of significant symptoms):   Risk status (Self / Other harm or suicidal ideation)   Patient denies current fears or concerns for personal safety.   Patient denies current or recent suicidal ideation or behaviors.   Patient denies current or recent homicidal ideation or behaviors.   Patient denies current or recent self injurious behavior or ideation.   Patient denies other safety concerns.   Patient reports there has been no change in risk factors since their last session.     Patient reports  there has been no change in protective factors since their last session.     Recommended that patient call 911 or go to the local ED should there be a change in any of these risk factors.     Appearance:   Appropriate    Eye Contact:   Good    Psychomotor Behavior: Normal    Attitude:   Cooperative    Orientation:   All   Speech    Rate / Production: Normal     Volume:  Normal    Mood:    Normal   Affect:    Appropriate    Thought Content:  Clear    Thought Form:  Coherent  Logical    Insight:    Good      Medication Review:   No changes to current psychiatric medication(s)     Medication Compliance:   Yes     Changes in Health Issues:   None reported     Chemical Use Review:   Substance Use: Chemical use reviewed, no active concerns identified      Tobacco Use: No current tobacco use.      Diagnosis:  Generalized anxiety disorder    Collateral Reports Completed:   Routed note to PCP    PLAN: (Patient Tasks / Therapist Tasks / Other)  1. Continued self care strategies.  2. Continue current medication regime.   3. Stop thought processing techniques.  4. Pt. will speak will her Md. regarding Lyrica   5. Recc. The Book (How did I get Here?        Swetha Weinberg LICSW  2/14/23                                                         ______________________________________________________________________  Individual Treatment Plan     Patient's Name: Radha Beckwith                    YOB: 1975     Date of Creation: 12/5/22  Date Treatment Plan Last Reviewed/Revised:      DSM5 Diagnoses: Generalized anxiety disorder  Psychosocial / Contextual Factors: Current stressors with holiday season coming up and medical concerns.  PROMIS (reviewed every 90 days):      Referral / Collaboration:  Referral to another professional/service is not indicated at this time..     Anticipated number of session for this episode of care: 6-9 sessions  Anticipation frequency of session: Biweekly  Anticipated Duration of each  session: 38-52 minutes  Treatment plan will be reviewed in 90 days or when goals have been changed.         MeasurableTreatment Goal(s) related to diagnosis / functional impairment(s)  MeasurableTreatment Goal(s) related to diagnosis / functional impairment(s)  Goal 1: Patient will focus on recognizing and managing symptoms of anxiety as evidenced by a BO-7 score of 5 or less    I will know I've met my goal when I do not have to call and reach out for extra support from the clinic.       Objective #A (Patient Action)                          Patient will practice deep breathing at least 3-5 a day.  Status: New - Date 12/5/22     Objective #B  Patient will attend and participate in social or recreational activities At least 3 times per week.  Status: New -  Date 12/5/22        Objective #C  Patient will use at least 5 coping skills for anxiety management in the next 12 weeks.  Status: New  - Date 12/5/22        Objective #D                Patient will use thought-stopping strategy daily to reduce intrusive thoughts.  Status: New - Date: 12/5/22     Objective #E  Patient will use distraction each time intrusive worry surfaces.                       Status: New - Date: 12/5/22     Objective #F  Patient will Improve quantity and quality of night time sleep / decrease daytime naps.  Status: New - Date: New 12/5/22      Intervention(s)  Therapist will teach CBT skills to challenge cognitive distortions and core beliefs.  Therapist will teach and model positive self-talk behaviors.  Therapist will use psychodynamic approaches to explore early attachments and schemas.  Therapist will teach DBT mindfulness and emotion regulation skills.                Patient has reviewed and agreed to the above plan.     Swetha Weinberg [facility]                December 5, 2022     Answers for HPI/ROS submitted by the patient on 12/4/2022  BO 7 TOTAL SCORE: 19     Answers for HPI/ROS submitted by the patient on 12/22/2022  If you checked off any  problems, how difficult have these problems made it for you to do your work, take care of things at home, or get along with other people?: Extremely difficult  PHQ9 TOTAL SCORE: 18     Kyaw Weinberg [facility]                       February 7, 2023     Answers for HPI/ROS submitted by the patient on 2/7/2023  If you checked off any problems, how difficult have these problems made it for you to do your work, take care of things at home, or get along with other people?: Extremely difficult  PHQ9 TOTAL SCORE: 18        Answers for HPI/ROS submitted by the patient on 2/14/2023  If you checked off any problems, how difficult have these problems made it for you to do your work, take care of things at home, or get along with other people?: Extremely difficult  PHQ9 TOTAL SCORE: 16  BO 7 TOTAL SCORE: 17

## 2023-02-14 NOTE — TELEPHONE ENCOUNTER
Zofran  Prescription approved per [facility] Refill Protocol.    Xanax  Routing refill request to provider for review/approval because:  Drug not on the [facility] [place]   Requested Prescriptions   Pending Prescriptions Disp Refills     ALPRAZolam (XANAX) 1 MG tablet [Pharmacy Med Name: ALPRAZOLAM 1MG TABS] 60 tablet 0     Sig: TAKE ONE TABLET BY MOUTH TWICE A DAY       There is no refill protocol information for this order       Renea Gagnon RN

## 2023-02-15 ASSESSMENT — ANXIETY QUESTIONNAIRES
7. FEELING AFRAID AS IF SOMETHING AWFUL MIGHT HAPPEN: NEARLY EVERY DAY
8. IF YOU CHECKED OFF ANY PROBLEMS, HOW DIFFICULT HAVE THESE MADE IT FOR YOU TO DO YOUR WORK, TAKE CARE OF THINGS AT HOME, OR GET ALONG WITH OTHER PEOPLE?: EXTREMELY DIFFICULT
GAD7 TOTAL SCORE: 17
GAD7 TOTAL SCORE: 17

## 2023-02-17 ENCOUNTER — HOSPITAL ENCOUNTER (OUTPATIENT)
Dept: CT IMAGING | Facility: CLINIC | Age: 48
Discharge: HOME | End: 2023-02-17
Attending: NURSE PRACTITIONER | Admitting: NURSE PRACTITIONER
Payer: COMMERCIAL

## 2023-02-17 DIAGNOSIS — T40.2X5A CONSTIPATION DUE TO OPIOID THERAPY: ICD-10-CM

## 2023-02-17 DIAGNOSIS — K59.03 CONSTIPATION DUE TO OPIOID THERAPY: ICD-10-CM

## 2023-02-17 DIAGNOSIS — R19.00 ABDOMINAL MASS, UNSPECIFIED ABDOMINAL LOCATION: ICD-10-CM

## 2023-02-17 PROCEDURE — 250N000011 HC RX IP 250 OP 636: Performed by: NURSE PRACTITIONER

## 2023-02-17 PROCEDURE — 74177 CT ABD & PELVIS W/CONTRAST: CPT

## 2023-02-17 PROCEDURE — 250N000009 HC RX 250: Performed by: NURSE PRACTITIONER

## 2023-02-17 RX ORDER — IOPAMIDOL 755 MG/ML
500 INJECTION, SOLUTION INTRAVASCULAR ONCE
Status: COMPLETED | OUTPATIENT
Start: 2023-02-17 | End: 2023-02-17

## 2023-02-17 RX ADMIN — IOPAMIDOL 85 ML: 755 INJECTION, SOLUTION INTRAVENOUS at 14:21

## 2023-02-17 RX ADMIN — SODIUM CHLORIDE 60 ML: 9 INJECTION, SOLUTION INTRAVENOUS at 14:22

## 2023-02-22 NOTE — TELEPHONE ENCOUNTER
Patient is currently established with a GI provider. Removing order and closing encounter.     Lynn Cheng RN   [facility]

## 2023-02-27 ENCOUNTER — HOSPITAL ENCOUNTER (EMERGENCY)
Facility: CLINIC | Age: 48
Discharge: HOME | End: 2023-02-27
Attending: FAMILY MEDICINE | Admitting: FAMILY MEDICINE
Payer: COMMERCIAL

## 2023-02-27 VITALS
DIASTOLIC BLOOD PRESSURE: 96 MMHG | TEMPERATURE: 97.8 F | SYSTOLIC BLOOD PRESSURE: 137 MMHG | HEIGHT: 64 IN | HEART RATE: 85 BPM | BODY MASS INDEX: 31.58 KG/M2 | OXYGEN SATURATION: 96 % | WEIGHT: 185 LBS | RESPIRATION RATE: 20 BRPM

## 2023-02-27 DIAGNOSIS — M25.512 ACUTE PAIN OF LEFT SHOULDER: ICD-10-CM

## 2023-02-27 DIAGNOSIS — S46.912A STRAIN OF LEFT SHOULDER, INITIAL ENCOUNTER: ICD-10-CM

## 2023-02-27 DIAGNOSIS — R07.89 CHEST WALL PAIN: ICD-10-CM

## 2023-02-27 LAB
ANION GAP SERPL CALCULATED.3IONS-SCNC: 10 MMOL/L (ref 7–15)
BASOPHILS # BLD AUTO: 0.1 10E3/UL (ref 0–0.2)
BASOPHILS NFR BLD AUTO: 1 %
BUN SERPL-MCNC: 11 MG/DL (ref 6–20)
CALCIUM SERPL-MCNC: 9.4 MG/DL (ref 8.6–10)
CHLORIDE SERPL-SCNC: 100 MMOL/L (ref 98–107)
CREAT SERPL-MCNC: 0.67 MG/DL (ref 0.51–0.95)
DEPRECATED HCO3 PLAS-SCNC: 27 MMOL/L (ref 22–29)
EOSINOPHIL # BLD AUTO: 0.1 10E3/UL (ref 0–0.7)
EOSINOPHIL NFR BLD AUTO: 2 %
ERYTHROCYTE [DISTWIDTH] IN BLOOD BY AUTOMATED COUNT: 12.4 % (ref 10–15)
GFR SERPL CREATININE-BSD FRML MDRD: >90 ML/MIN/1.73M2
GLUCOSE SERPL-MCNC: 96 MG/DL (ref 70–99)
HCT VFR BLD AUTO: 39.3 % (ref 35–47)
HGB BLD-MCNC: 12.8 G/DL (ref 11.7–15.7)
IMM GRANULOCYTES # BLD: 0 10E3/UL
IMM GRANULOCYTES NFR BLD: 0 %
LYMPHOCYTES # BLD AUTO: 2.3 10E3/UL (ref 0.8–5.3)
LYMPHOCYTES NFR BLD AUTO: 33 %
MCH RBC QN AUTO: 29 PG (ref 26.5–33)
MCHC RBC AUTO-ENTMCNC: 32.6 G/DL (ref 31.5–36.5)
MCV RBC AUTO: 89 FL (ref 78–100)
MONOCYTES # BLD AUTO: 0.4 10E3/UL (ref 0–1.3)
MONOCYTES NFR BLD AUTO: 5 %
NEUTROPHILS # BLD AUTO: 4.2 10E3/UL (ref 1.6–8.3)
NEUTROPHILS NFR BLD AUTO: 59 %
NRBC # BLD AUTO: 0 10E3/UL
NRBC BLD AUTO-RTO: 0 /100
PLATELET # BLD AUTO: 195 10E3/UL (ref 150–450)
POTASSIUM SERPL-SCNC: 4 MMOL/L (ref 3.4–5.3)
RBC # BLD AUTO: 4.41 10E6/UL (ref 3.8–5.2)
SODIUM SERPL-SCNC: 137 MMOL/L (ref 136–145)
TROPONIN T SERPL HS-MCNC: <6 NG/L
WBC # BLD AUTO: 7 10E3/UL (ref 4–11)

## 2023-02-27 PROCEDURE — 93010 ELECTROCARDIOGRAM REPORT: CPT | Performed by: FAMILY MEDICINE

## 2023-02-27 PROCEDURE — 36415 COLL VENOUS BLD VENIPUNCTURE: CPT | Performed by: FAMILY MEDICINE

## 2023-02-27 PROCEDURE — 85025 COMPLETE CBC W/AUTO DIFF WBC: CPT | Performed by: FAMILY MEDICINE

## 2023-02-27 PROCEDURE — 99284 EMERGENCY DEPT VISIT MOD MDM: CPT | Performed by: FAMILY MEDICINE

## 2023-02-27 PROCEDURE — 99284 EMERGENCY DEPT VISIT MOD MDM: CPT | Mod: 25 | Performed by: FAMILY MEDICINE

## 2023-02-27 PROCEDURE — 93005 ELECTROCARDIOGRAM TRACING: CPT | Performed by: FAMILY MEDICINE

## 2023-02-27 PROCEDURE — 80048 BASIC METABOLIC PNL TOTAL CA: CPT | Performed by: FAMILY MEDICINE

## 2023-02-27 PROCEDURE — 84484 ASSAY OF TROPONIN QUANT: CPT | Performed by: FAMILY MEDICINE

## 2023-02-27 RX ORDER — KETOROLAC TROMETHAMINE 15 MG/ML
15 INJECTION, SOLUTION INTRAMUSCULAR; INTRAVENOUS ONCE
Status: COMPLETED | OUTPATIENT
Start: 2023-02-27 | End: 2023-02-27

## 2023-02-27 RX ORDER — OXYCODONE HYDROCHLORIDE 5 MG/1
5 TABLET ORAL EVERY 6 HOURS PRN
Qty: 10 TABLET | Refills: 0 | Status: SHIPPED | OUTPATIENT
Start: 2023-02-27 | End: 2023-03-02

## 2023-02-27 ASSESSMENT — ACTIVITIES OF DAILY LIVING (ADL): ADLS_ACUITY_SCORE: 35

## 2023-02-27 NOTE — DISCHARGE INSTRUCTIONS
Ice 20 minutes per hour, (20 minutes on, 40 minutes off) at least 4 times a day.  You may switch to alternate with heat after 48 hours if you find it helpful.  Tylenol as needed for pain.  You can use the oxycodone sparingly for more severe pain.  Follow-up with your primary physician if you have persistent problems or need refills.  Your heart tests were all normal tonight which is very reassuring.  It was nice visiting with you again.  I hope this settles down quickly for you.    Thank you for choosing [facility]. We appreciate the opportunity to meet your urgent medical needs. Please let us know if we could have done anything to make your stay more satisfying.    After discharge, please closely monitor for any new or worsening symptoms. Return to the Emergency Department if you develop any acute worsening signs or symptoms.    If you had lab work, cultures or imaging studies done during your stay, the final results may still be pending. We will call you if your plan of care needs to change. However, if you are not improving as expected, please follow up with your primary care provider or clinic.     Start any prescription medications that were prescribed to you and take them as directed.     Please see additional handouts that may be pertinent to your condition.

## 2023-02-27 NOTE — ED PROVIDER NOTES
History     Chief Complaint   Patient presents with     Chest Pain     Arm Pain     HPI  Radha Beckwith is a 47 year old female who presents to the ED with left arm and chest pain.  She woke up at 1030 this morning and noted left arm pain and felt like maybe she slept on it wrong.  She got up and then noticed a squeezing sensation in the left upper chest.  It hurts to move her arm.  She does not recall doing anything out of the ordinary the day before.  Then got some burning discomfort all the way down the arm into the hand.  Now has some discomfort into the center and lower part of her chest.  Last night she ate some Emy potatoes and had some pain in her abdomen and then in between her shoulder blades.  Her gallbladder is gone.  She just had upper and lower endoscopies recently which were unremarkable according to her.  Does have history of bleeding ulcers and switched from omeprazole to another acid medicine because of insurance reasons but she does not think it works as well as the omeprazole did.  She took a quarter of a regular aspirin and some Tylenol without relief.    Cardiac risk factors include smoking.  Smokes maybe 5 cigarettes a day on a bad day, she has hyperlipidemia and is on a statin and also somewhat of a family history of heart disease with a paternal cousin  MI.  Father  of a brain aneurysm in his 50s.  She has had MRIs of the brain and does not have an aneurysm.  She is not diabetic and not hypertensive.    CT of her abdomen on 2023 showed a moderate amount of stool in the colon otherwise unremarkable.  EGD from 2023 showed gastritis  Colonoscopy from 2023 showed a poor prep.  Colon was normal.  Recommend repeat colonoscopy in 3 to 5 years, due to poor prep.    Allergies:  Allergies   Allergen Reactions     Cafergot      2002            GI problems-     Seasonal Allergies      Sumatriptan      vomits after giving herself a shot     Compazine Anxiety      Droperidol Anxiety     Nubain [Nalbuphine Hcl] Anxiety     Prochlorperazine Palpitations     Uncontrolled movement       Problem List:    Patient Active Problem List    Diagnosis Date Noted     Moderate episode of recurrent major depressive disorder (H) 06/29/2022     Priority: Medium     Moderate major depression (H) 06/03/2019     Priority: Medium     Supraventricular tachycardia (H) 06/03/2019     Priority: Medium     Psychophysiological insomnia 12/30/2017     Priority: Medium     Chronic bilateral low back pain without sciatica 12/30/2017     Priority: Medium     Opioid dependence in remission (H) 08/02/2015     Priority: Medium     On suboxone treatement with Bryant Harkins.  (Noted in 2015).         Anxiety attack 07/31/2015     Priority: Medium     Hypokalemia 06/23/2015     Priority: Medium     Tobacco abuse 06/23/2015     Priority: Medium     Hyperlipidemia LDL goal <100 01/29/2014     Priority: Medium     Anxiety 08/19/2013     Priority: Medium     GERD (gastroesophageal reflux disease) 07/28/2010     Priority: Medium     Takes omeprazole and metoclopramide       Restless legs 07/28/2010     Priority: Medium        Past Medical History:    Past Medical History:   Diagnosis Date     Abdominal pain, right lower quadrant 03/09/2008     Anxiety attack 07/31/2015     Atypical chest pain 06/23/2015     De Quervain's disease (tenosynovitis)      Dehydration      Gastric ulcer 07/31/2015     GERD (gastroesophageal reflux disease) 07/28/2010     Ingrowing nail 01/09/2014     Migraines      Opioid dependence in remission (H) 08/02/2015     Other and unspecified ovarian cyst      Papanicolaou smear of cervix with low grade squamous intraepithelial lesion (LGSIL) 07/07/2017       Past Surgical History:    Past Surgical History:   Procedure Laterality Date     BIOPSY CERVICAL, LOCAL EXCISION, SINGLE/MULTIPLE N/A 8/10/2017    Procedure: BIOPSY CERVICAL, LOCAL EXCISION, SINGLE/MULTIPLE;;  Surgeon:  Michael Chandler MD;  [facility]     COLONOSCOPY N/A 1/6/2023    Procedure: COLONOSCOPY;  Surgeon: Michael Dozier MD;  [facility]     COLPOSCOPY, BIOPSY, COMBINED N/A 8/10/2017    Procedure: COMBINED COLPOSCOPY, BIOPSY;  Colposcopy with Cervical Biopsies and Endometrial Biopsy, Exam with Ultrasound;  Surgeon: Michael Chandler MD;  [facility]     ESOPHAGOSCOPY, GASTROSCOPY, DUODENOSCOPY (EGD), COMBINED N/A 4/17/2017    Procedure: COMBINED ESOPHAGOSCOPY, GASTROSCOPY, DUODENOSCOPY (EGD);  Surgeon: Ibrahima Esposito MD;  [facility]     ESOPHAGOSCOPY, GASTROSCOPY, DUODENOSCOPY (EGD), COMBINED N/A 1/6/2023    Procedure: ESOPHAGOGASTRODUODENOSCOPY, WITH BIOPSY;  Surgeon: Michael Dozier MD;  [facility]     EXAM UNDER ANESTHESIA PELVIC N/A 8/10/2017    Procedure: EXAM UNDER ANESTHESIA PELVIC;;  Surgeon: Michael Chandler MD;  [facility]     HYSTERECTOMY       HYSTERECTOMY, PAP NO LONGER INDICATED       INJECT EPIDURAL LUMBAR Bilateral 7/1/2022    Procedure: Lumbar 5-Sacral 1 Transforaminal Epidural Steroid Injection with fluoroscopic guidance and contrast, bilateral;  Surgeon: Trevor Ivory MD;  [facility]     LAPAROSCOPIC CHOLECYSTECTOMY N/A 2/2/2018    Procedure: LAPAROSCOPIC CHOLECYSTECTOMY;  Laparoscopic Cholecystectomy;  Surgeon: Tigre Lowry DO;  [facility]     LAPAROSCOPIC HYSTERECTOMY TOTAL N/A 10/30/2017    Procedure: LAPAROSCOPIC HYSTERECTOMY TOTAL;  LAPAROSCOPIC HYSTERECTOMY TOTAL POSSIBLE SALPINGO-OOPHERECTOMY (BILATERAL);  Surgeon: Michael Chandler MD;  [facility]     ZZHC UGI ENDOSCOPY, SIMPLE EXAM  01/07/08       Family History:    Family History   Problem Relation Age of Onset     Depression Mother      Respiratory Mother      Chronic Obstructive Pulmonary Disease Mother      Cerebrovascular Disease Father         Brain anyeurism     Breast Cancer Cousin      Adrenal Disorder Other      Chronic Obstructive  "Pulmonary Disease Other        Social History:  Marital Status:  Single [1]  Social History     Tobacco Use     Smoking status: Every Day     Packs/day: 0.25     Years: 20.00     Pack years: 5.00     Types: Cigarettes     Smokeless tobacco: Never   Vaping Use     Vaping Use: Some days     Substances: Nicotine, CBD     Devices: Cyphoma tank   Substance Use Topics     Alcohol use: Yes     Comment: occasional drinks; about 5-6 beers/week     Drug use: No        Medications:    acetaminophen (TYLENOL) 500 MG tablet  ALPRAZolam (XANAX) 1 MG tablet  bisacodyl (DULCOLAX) 5 MG EC tablet  buprenorphine HCl-naloxone HCl (SUBOXONE) 2-0.5 MG per film  cetirizine (ZYRTEC) 10 MG tablet  cyclobenzaprine (FLEXERIL) 5 MG tablet  DULoxetine (CYMBALTA) 30 MG capsule  famotidine (PEPCID) 40 MG tablet  lactulose (CHRONULAC) 10 GM/15ML solution  Melatonin 10 MG TABS tablet  ondansetron (ZOFRAN ODT) 4 MG ODT tab  oxyCODONE (ROXICODONE) 5 MG tablet  pantoprazole (PROTONIX) 40 MG EC tablet  polyethylene glycol (MIRALAX) 17 GM/Dose powder  QUEtiapine (SEROQUEL) 200 MG tablet  rOPINIRole (REQUIP) 1 MG tablet  simvastatin (ZOCOR) 10 MG tablet  sucralfate (CARAFATE) 1 GM tablet  Turmeric 500 MG CAPS  zolpidem (AMBIEN) 5 MG tablet          Review of Systems   All other systems reviewed and are negative.      Physical Exam   BP: (!) 128/105  Pulse: 110  Temp: 97.8  F (36.6  C)  Resp: 20  Height: 162.6 cm (5' 4\")  Weight: 83.9 kg (185 lb)  SpO2: 100 %      Physical Exam  Constitutional:       General: She is not in acute distress.     Appearance: She is well-developed.   HENT:      Mouth/Throat:      Mouth: Mucous membranes are moist.      Pharynx: Oropharynx is clear.   Eyes:      Extraocular Movements: Extraocular movements intact.   Cardiovascular:      Rate and Rhythm: Normal rate and regular rhythm.      Pulses: Normal pulses.      Heart sounds: Normal heart sounds.   Pulmonary:      Effort: Pulmonary effort is normal.      Breath sounds: " Normal breath sounds.   Chest:      Chest wall: Tenderness (mild left upper) present.   Abdominal:      Palpations: Abdomen is soft.   Musculoskeletal:         General: Tenderness ( Moderate muscular tenderness of the left deltoid region down into the insertion of the deltoid tendon on the humerus.) present. Normal range of motion.      Right lower leg: No edema.      Left lower leg: No edema.   Skin:     General: Skin is warm and dry.   Neurological:      General: No focal deficit present.      Mental Status: She is alert and oriented to person, place, and time.   Psychiatric:         Mood and Affect: Mood normal.         ED Course                 Procedures              EKG Interpretation:      Interpreted by Reinaldo Marroquin MD  Time reviewed: 4:51 PM:  Symptoms at time of EKG: chest pain   Rhythm: normal sinus   Rate: normal  Axis: normal  Ectopy: none  Conduction: normal  ST Segments/ T Waves: No ST-T wave changes  Q Waves: none  Comparison to prior: Unchanged    Clinical Impression: normal EKG          Critical Care time:  none               Results for orders placed or performed during the hospital encounter of 02/27/23 (from the past 24 hour(s))   CBC with platelets differential    Narrative    The following orders were created for panel order CBC with platelets differential.  Procedure                               Abnormality         Status                     ---------                               -----------         ------                     CBC with platelets and d...[[id number]]                      Final result                 Please view results for these tests on the individual orders.   Troponin T, High Sensitivity   Result Value Ref Range    Troponin T, High Sensitivity <6 <=14 ng/L   Basic metabolic panel   Result Value Ref Range    Sodium 137 136 - 145 mmol/L    Potassium 4.0 3.4 - 5.3 mmol/L    Chloride 100 98 - 107 mmol/L    Carbon Dioxide (CO2) 27 22 - 29 mmol/L    Anion Gap 10 7 - 15  mmol/L    Urea Nitrogen 11.0 6.0 - 20.0 mg/dL    Creatinine 0.67 0.51 - 0.95 mg/dL    Calcium 9.4 8.6 - 10.0 mg/dL    Glucose 96 70 - 99 mg/dL    GFR Estimate >90 >60 mL/min/1.73m2   CBC with platelets and differential   Result Value Ref Range    WBC Count 7.0 4.0 - 11.0 10e3/uL    RBC Count 4.41 3.80 - 5.20 10e6/uL    Hemoglobin 12.8 11.7 - 15.7 g/dL    Hematocrit 39.3 35.0 - 47.0 %    MCV 89 78 - 100 fL    MCH 29.0 26.5 - 33.0 pg    MCHC 32.6 31.5 - 36.5 g/dL    RDW 12.4 10.0 - 15.0 %    Platelet Count 195 150 - 450 10e3/uL    % Neutrophils 59 %    % Lymphocytes 33 %    % Monocytes 5 %    % Eosinophils 2 %    % Basophils 1 %    % Immature Granulocytes 0 %    NRBCs per 100 WBC 0 <1 /100    Absolute Neutrophils 4.2 1.6 - 8.3 10e3/uL    Absolute Lymphocytes 2.3 0.8 - 5.3 10e3/uL    Absolute Monocytes 0.4 0.0 - 1.3 10e3/uL    Absolute Eosinophils 0.1 0.0 - 0.7 10e3/uL    Absolute Basophils 0.1 0.0 - 0.2 10e3/uL    Absolute Immature Granulocytes 0.0 <=0.4 10e3/uL    Absolute NRBCs 0.0 10e3/uL       Medications   ketorolac (TORADOL) injection 15 mg (15 mg Intravenous Not Given 2/27/23 1803)       Assessments & Plan (with Medical Decision Making)  47-year-old with left upper arm pain that she noticed when she woke up at 1030 this morning.  Then into her left upper chest and down her left arm.  Pain with movement and palpation of the left shoulder.  Mild left upper chest tenderness.  EKG was normal showing no change from previous.  Troponin was undetectable over 6 hours after the onset of her pain.  This appears to be musculoskeletal in origin.  It hurts when she moves her shoulder or when it is palpated as well as palpation of the left upper chest.  I am         I have reviewed the nursing notes.    I have reviewed the findings, diagnosis, plan and need for follow up with the patient.           Medical Decision Making  The patient's presentation is strongly suggestive of moderate complexity (an undiagnosed new problem  with uncertain diagnosis).    The patient's evaluation involved:  ordering and/or review of 3+ test(s) in this encounter (see separate area of note for details)  review of 3+ test result(s) ordered prior to this encounter (see separate area of note for details)    The patient's management involved moderate risk (prescription drug management including medications given in the ED).      Discharge Medication List as of 2/27/2023  5:59 PM      START taking these medications    Details   oxyCODONE (ROXICODONE) 5 MG tablet Take 1 tablet (5 mg) by mouth every 6 hours as needed for pain, Disp-10 tablet, R-0, E-Prescribe             Final diagnoses:   Acute pain of left shoulder   Strain of left shoulder, initial encounter   Chest wall pain       2/27/2023   [facility] EMERGENCY DEPT     Reinaldo Marroquin MD  02/27/23 2166

## 2023-02-27 NOTE — ED TRIAGE NOTES
Pt reports when she woke up today about 1030 she was having left arm pain. States about 30 minutes last the pain when into her left chest.      Triage Assessment     Row Name 02/27/23 6534       Triage Assessment (Adult)    Airway WDL WDL       Respiratory WDL    Respiratory WDL WDL       Skin Circulation/Temperature WDL    Skin Circulation/Temperature WDL WDL       Cardiac WDL    Cardiac WDL chest pain       Chest Pain Assessment    Chest Pain Location arm, left;anterior chest, left

## 2023-03-01 DIAGNOSIS — M54.2 CHRONIC NECK PAIN: ICD-10-CM

## 2023-03-01 DIAGNOSIS — G89.29 CHRONIC NECK PAIN: ICD-10-CM

## 2023-03-01 DIAGNOSIS — F51.04 PSYCHOPHYSIOLOGICAL INSOMNIA: ICD-10-CM

## 2023-03-01 RX ORDER — ZOLPIDEM TARTRATE 5 MG/1
TABLET ORAL
Qty: 30 TABLET | Refills: 0 | Status: SHIPPED | OUTPATIENT
Start: 2023-03-01 | End: 2023-04-04

## 2023-03-01 RX ORDER — CYCLOBENZAPRINE HCL 5 MG
TABLET ORAL
Qty: 90 TABLET | Refills: 5 | Status: SHIPPED | OUTPATIENT
Start: 2023-03-01 | End: 2023-07-13

## 2023-03-02 ENCOUNTER — VIRTUAL VISIT (OUTPATIENT)
Dept: BEHAVIORAL HEALTH | Facility: CLINIC | Age: 48
End: 2023-03-02
Payer: COMMERCIAL

## 2023-03-02 DIAGNOSIS — F41.1 GAD (GENERALIZED ANXIETY DISORDER): Primary | ICD-10-CM

## 2023-03-02 PROCEDURE — 90834 PSYTX W PT 45 MINUTES: CPT | Mod: VID | Performed by: SOCIAL WORKER

## 2023-03-02 NOTE — PROGRESS NOTES
[facility]                                     Progress Note    Patient Name: Radha Beckwith  Date: 3/2/23         Service Type: Individual      Session Start Time: 1000  Session End Time: 1052     Session Length: 52    Session #: 23    Attendees: Client    Service Modality:  Video Visit:      Provider verified identity through the following two step process.  Patient provided:  Patient is known previously to provider    Telemedicine Visit: The patient's condition can be safely assessed and treated via synchronous audio and visual telemedicine encounter.      Reason for Telemedicine Visit: Patient has requested telehealth visit    Originating Site (Patient Location): Patient's home    Distant Site (Provider Location): Provider Remote Setting- Home Office    Consent:  The patient/guardian has verbally consented to: the potential risks and benefits of telemedicine (video visit) versus in person care; bill my insurance or make self-payment for services provided; and responsibility for payment of non-covered services.     Patient would like the video invitation sent by:  Send to e-mail at: [email or web address]    Mode of Communication:  Video Conference via [facility]    Distant Location (Provider):  Off-site    As the provider I attest to compliance with applicable laws and regulations related to telemedicine.    DATA  Interactive Complexity: No  Crisis: No        Progress Since Last Session (Related to Symptoms / Goals / Homework):   Symptoms: tooth issues, poor self esteem and self worth.     Homework: Partially completed      Episode of Care Goals: Satisfactory progress - ACTION (Actively working towards change); Intervened by reinforcing change plan / affirming steps taken     Current / Ongoing Stressors and Concerns:   Pending teeth loss. Impact on self worth and feelings and low self esteem.              Family of origin issues.                            Treatment Objective(s) Addressed  in This Session:   During today's session discussed how she is feeling regarding the Anniversary of her mothers death she would have been 73 yrs old. Discussed the embarrassment of having to have her teeth removed  And whether or not she should have dentures or implants.  Discussed                 Intervention:   CBT, solution focused therapy              Time -line of her life    Assessments completed prior to visit:  The following assessments were completed by patient for this visit:  PHQ9:   PHQ-9 SCORE 8/30/2022 10/4/2022 11/8/2022 12/22/2022 1/1/2023 2/7/2023 2/14/2023   PHQ-9 Total Score MyChart 16 (Moderately severe depression) 18 (Moderately severe depression) 18 (Moderately severe depression) 18 (Moderately severe depression) 18 (Moderately severe depression) 18 (Moderately severe depression) 16 (Moderately severe depression)   PHQ-9 Total Score 16 18 18 18 18 18 16     GAD7:   BO-7 SCORE 12/4/2022 1/1/2023 2/14/2023 2/14/2023 2/14/2023 2/14/2023 2/14/2023   Total Score 19 (severe anxiety) 20 (severe anxiety) - - - - 17 (severe anxiety)   Total Score 19 20 17 17 17 17 17     PROMIS 10-Global Health (all questions and answers displayed):   PROMIS 10 10/26/2022 1/25/2023 2/7/2023   In general, would you say your health is: Fair Poor Poor   In general, would you say your quality of life is: Poor Poor Poor   In general, how would you rate your physical health? Poor Poor Poor   In general, how would you rate your mental health, including your mood and your ability to think? Fair Fair Poor   In general, how would you rate your satisfaction with your social activities and relationships? Poor Fair Poor   In general, please rate how well you carry out your usual social activities and roles Poor Fair Poor   To what extent are you able to carry out your everyday physical activities such as walking, climbing stairs, carrying groceries, or moving a chair? A little A little A little   How often have you been bothered by  emotional problems such as feeling anxious, depressed or irritable? Always Always Always   How would you rate your fatigue on average? Moderate Severe Severe   How would you rate your pain on average?   0 = No Pain  to  10 = Worst Imaginable Pain 8 8 9   In general, would you say your health is: 2 1 1   In general, would you say your quality of life is: 1 1 1   In general, how would you rate your physical health? 1 1 1   In general, how would you rate your mental health, including your mood and your ability to think? 2 2 1   In general, how would you rate your satisfaction with your social activities and relationships? 1 2 1   In general, please rate how well you carry out your usual social activities and roles. (This includes activities at home, at work and in your community, and responsibilities as a parent, child, spouse, employee, friend, etc.) 1 2 1   To what extent are you able to carry out your everyday physical activities such as walking, climbing stairs, carrying groceries, or moving a chair? 2 2 2   In the past 7 days, how often have you been bothered by emotional problems such as feeling anxious, depressed, or irritable? 5 5 5   In the past 7 days, how would you rate your fatigue on average? 3 4 4   In the past 7 days, how would you rate your pain on average, where 0 means no pain, and 10 means worst imaginable pain? 8 8 9   Global Mental Health Score 5 6 4   Global Physical Health Score 8 7 7   PROMIS TOTAL - SUBSCORES 13 13 11   Some recent data might be hidden         ASSESSMENT: Current Emotional / Mental Status (status of significant symptoms):   Risk status (Self / Other harm or suicidal ideation)   Patient denies current fears or concerns for personal safety.   Patient denies current or recent suicidal ideation or behaviors.   Patient denies current or recent homicidal ideation or behaviors.   Patient denies current or recent self injurious behavior or ideation.   Patient denies other safety  concerns.   Patient reports there has been no change in risk factors since their last session.     Patient reports there has been no change in protective factors since their last session.     Recommended that patient call 911 or go to the local ED should there be a change in any of these risk factors.     Appearance:   Appropriate    Eye Contact:   Good    Psychomotor Behavior: Normal    Attitude:   Cooperative    Orientation:   All   Speech    Rate / Production: Normal     Volume:  Normal    Mood:    Normal   Affect:    Appropriate    Thought Content:  Clear    Thought Form:  Coherent  Logical    Insight:    Good      Medication Review:   No changes to current psychiatric medication(s)     Medication Compliance:   Yes     Changes in Health Issues:   Issues with teeth. Will need to be removed.     Chemical Use Review:   Substance Use: Chemical use reviewed, no active concerns identified      Tobacco Use: No current tobacco use.      Diagnosis:  Generalized anxiety disorder    Collateral Reports Completed:   Routed note to PCP    PLAN: (Patient Tasks / Therapist Tasks / Other)  1. Continued self care strategies.  2. Continue current medication regime.   3. Stop thought processing techniques.  4. Pt. will speak will her Md. regarding Lyrica   5. Recc. The Book (How did I get Here?  6. Might be applying for Social Security            Swetha Weinberg [facility]  3/2/23                                                         ___________________________________________________________________  Individual Treatment Plan     Patient's Name: Radha Beckwith                    YOB: 1975     Date of Creation: 12/5/22  Date Treatment Plan Last Reviewed/Revised:      DSM5 Diagnoses: Generalized anxiety disorder  Psychosocial / Contextual Factors: Current stressors with holiday season coming up and medical concerns.  PROMIS (reviewed every 90 days):      Referral / Collaboration:  Referral to another  professional/service is not indicated at this time..     Anticipated number of session for this episode of care: 6-9 sessions  Anticipation frequency of session: Biweekly  Anticipated Duration of each session: 38-52 minutes  Treatment plan will be reviewed in 90 days or when goals have been changed.         MeasurableTreatment Goal(s) related to diagnosis / functional impairment(s)  MeasurableTreatment Goal(s) related to diagnosis / functional impairment(s)  Goal 1: Patient will focus on recognizing and managing symptoms of anxiety as evidenced by a BO-7 score of 5 or less    I will know I've met my goal when I do not have to call and reach out for extra support from the clinic.       Objective #A (Patient Action)                          Patient will practice deep breathing at least 3-5 a day.  Status: New - Date 12/5/22     Objective #B  Patient will attend and participate in social or recreational activities At least 3 times per week.  Status: New -  Date 12/5/22        Objective #C  Patient will use at least 5 coping skills for anxiety management in the next 12 weeks.  Status: New  - Date 12/5/22        Objective #D                Patient will use thought-stopping strategy daily to reduce intrusive thoughts.  Status: New - Date: 12/5/22     Objective #E  Patient will use distraction each time intrusive worry surfaces.                       Status: New - Date: 12/5/22     Objective #F  Patient will Improve quantity and quality of night time sleep / decrease daytime naps.  Status: New - Date: New 12/5/22      Intervention(s)  Therapist will teach CBT skills to challenge cognitive distortions and core beliefs.  Therapist will teach and model positive self-talk behaviors.  Therapist will use psychodynamic approaches to explore early attachments and schemas.  Therapist will teach DBT mindfulness and emotion regulation skills.                Patient has reviewed and agreed to the above plan.     Swetha Weinberg  [facility]                December 5, 2022     Answers for HPI/ROS submitted by the patient on 12/4/2022  BO 7 TOTAL SCORE: 19     Answers for HPI/ROS submitted by the patient on 12/22/2022  If you checked off any problems, how difficult have these problems made it for you to do your work, take care of things at home, or get along with other people?: Extremely difficult  PHQ9 TOTAL SCORE: 18     Kyaw Weinberg, [facility]                       February 7, 2023     Answers for HPI/ROS submitted by the patient on 2/7/2023  If you checked off any problems, how difficult have these problems made it for you to do your work, take care of things at home, or get along with other people?: Extremely difficult  PHQ9 TOTAL SCORE: 18        Answers for HPI/ROS submitted by the patient on 2/14/2023  If you checked off any problems, how difficult have these problems made it for you to do your work, take care of things at home, or get along with other people?: Extremely difficult  PHQ9 TOTAL SCORE: 16  BO 7 TOTAL SCORE: 17       Answers for HPI/ROS submitted by the patient on 2/15/2023  BO 7 TOTAL SCORE: 17

## 2023-03-09 ENCOUNTER — OFFICE VISIT (OUTPATIENT)
Dept: AUDIOLOGY | Facility: CLINIC | Age: 48
End: 2023-03-09
Payer: COMMERCIAL

## 2023-03-09 DIAGNOSIS — H90.3 BILATERAL SENSORINEURAL HEARING LOSS: Primary | ICD-10-CM

## 2023-03-09 PROCEDURE — 99207 PR NO CHARGE LOS: CPT | Performed by: AUDIOLOGIST

## 2023-03-09 PROCEDURE — 92593 PR HEARING AID CHECK, BINAURAL: CPT | Performed by: AUDIOLOGIST

## 2023-03-09 NOTE — PROGRESS NOTES
AUDIOLOGY REPORT     SUBJECTIVE:  Radha Beckwith is a 47 year old female who was seen in the Audiology Clinic at the [facility] on 3/9/2023 to  a replacement hearing aid for her left ear. She reported she found the lost hearing aid. Previous results have revealed normal sloping to moderate sensorineural hearing loss bilaterally.  The patient was fit with binaural Signia Pure Charge & Go 5 AX RICs on 7/28/2022.  Radha reports the left device does not seem to fit as well in her ear even after adding a sport lock.      OBJECTIVE:   Changed from a small tulip dome to small double dome, she noted a clicking sound with full insertion. Connected hearing aids in programming software, updated firmware, and copied right hearing aid settings to the left ear. She noted improved sound quality and the clicking sound seemed to stop.     No charge visit today (in warranty hearing aid check).     ASSESSMENT:   A follow-up appointment for hearing aid fitting was completed today. Changes to hearing aid was completed as outlined above.      PLAN:  Discussed custom canal earmolds for improved retention. Scheduled follow up in 3-4 weeks. Please call this clinic with any questions regarding today s appointment.     Vivienne Rodriges.  [place] Licensed Audiologist #[id number]

## 2023-03-13 DIAGNOSIS — G25.81 RESTLESS LEGS SYNDROME (RLS): ICD-10-CM

## 2023-03-13 DIAGNOSIS — F41.9 ANXIETY: ICD-10-CM

## 2023-03-14 RX ORDER — ROPINIROLE 1 MG/1
TABLET, FILM COATED ORAL
Qty: 90 TABLET | Refills: 0 | Status: SHIPPED | OUTPATIENT
Start: 2023-03-14 | End: 2023-06-19

## 2023-03-14 RX ORDER — ALPRAZOLAM 1 MG
TABLET ORAL
Qty: 60 TABLET | Refills: 0 | Status: SHIPPED | OUTPATIENT
Start: 2023-03-14 | End: 2023-04-19

## 2023-03-14 NOTE — TELEPHONE ENCOUNTER
"Requested Prescriptions   Pending Prescriptions Disp Refills    ALPRAZolam (XANAX) 1 MG tablet [Pharmacy Med Name: ALPRAZOLAM 1MG TABS] 60 tablet 0     Sig: TAKE ONE TABLET BY MOUTH TWICE A DAY       There is no refill protocol information for this order       rOPINIRole (REQUIP) 1 MG tablet [Pharmacy Med Name: ROPINIROLE HCL 1MG TABS] 90 tablet 0     Sig: TAKE ONE TABLET BY MOUTH EVERY NIGHT AT BEDTIME       Antiparkinson's Agents Protocol Failed - 3/13/2023  4:38 PM        Failed - Blood pressure under 140/90 in past 12 months     BP Readings from Last 3 Encounters:   02/27/23 (!) 137/96   01/06/23 117/81   11/07/22 139/85                 Failed - ALT on record in past 12 months         Recent Labs   Lab Test 07/06/21  1153   ALT 34             Failed - Recent (6 mo) or future (30 days) visit within the authorizing provider's specialty     Patient had office visit in the last 6 months or has a visit in the next 30 days with authorizing provider or within the authorizing provider's specialty.  See \"Patient Info\" tab in inbasket, or \"Choose Columns\" in Meds & Orders section of the refill encounter.            Passed - CBC on record in past 12 months     Recent Labs   Lab Test 02/27/23  1701   WBC 7.0   RBC 4.41   HGB 12.8   HCT 39.3                    Passed - Serum Creatinine on file in past 12 months     Recent Labs   Lab Test 02/27/23  1701   CR 0.67       Ok to refill medication if creatinine is low          Passed - Medication is active on med list        Passed - Patient is age 18 or older        Passed - No active pregnancy on record        Passed - No positive pregnancy test in the past 12 months             "

## 2023-03-20 ENCOUNTER — MEDICAL CORRESPONDENCE (OUTPATIENT)
Dept: HEALTH INFORMATION MANAGEMENT | Facility: CLINIC | Age: 48
End: 2023-03-20
Payer: COMMERCIAL

## 2023-03-25 ASSESSMENT — ANXIETY QUESTIONNAIRES
8. IF YOU CHECKED OFF ANY PROBLEMS, HOW DIFFICULT HAVE THESE MADE IT FOR YOU TO DO YOUR WORK, TAKE CARE OF THINGS AT HOME, OR GET ALONG WITH OTHER PEOPLE?: EXTREMELY DIFFICULT
7. FEELING AFRAID AS IF SOMETHING AWFUL MIGHT HAPPEN: SEVERAL DAYS
4. TROUBLE RELAXING: MORE THAN HALF THE DAYS
1. FEELING NERVOUS, ANXIOUS, OR ON EDGE: NEARLY EVERY DAY
6. BECOMING EASILY ANNOYED OR IRRITABLE: NEARLY EVERY DAY
IF YOU CHECKED OFF ANY PROBLEMS ON THIS QUESTIONNAIRE, HOW DIFFICULT HAVE THESE PROBLEMS MADE IT FOR YOU TO DO YOUR WORK, TAKE CARE OF THINGS AT HOME, OR GET ALONG WITH OTHER PEOPLE: EXTREMELY DIFFICULT
GAD7 TOTAL SCORE: 16
GAD7 TOTAL SCORE: 16
5. BEING SO RESTLESS THAT IT IS HARD TO SIT STILL: SEVERAL DAYS
GAD7 TOTAL SCORE: 16
7. FEELING AFRAID AS IF SOMETHING AWFUL MIGHT HAPPEN: SEVERAL DAYS
2. NOT BEING ABLE TO STOP OR CONTROL WORRYING: NEARLY EVERY DAY
3. WORRYING TOO MUCH ABOUT DIFFERENT THINGS: NEARLY EVERY DAY

## 2023-03-27 ENCOUNTER — VIRTUAL VISIT (OUTPATIENT)
Dept: BEHAVIORAL HEALTH | Facility: CLINIC | Age: 48
End: 2023-03-27
Payer: COMMERCIAL

## 2023-03-27 DIAGNOSIS — F41.1 GAD (GENERALIZED ANXIETY DISORDER): Primary | ICD-10-CM

## 2023-03-27 PROCEDURE — 90834 PSYTX W PT 45 MINUTES: CPT | Mod: VID | Performed by: SOCIAL WORKER

## 2023-03-27 ASSESSMENT — PATIENT HEALTH QUESTIONNAIRE - PHQ9
SUM OF ALL RESPONSES TO PHQ QUESTIONS 1-9: 16
SUM OF ALL RESPONSES TO PHQ QUESTIONS 1-9: 16
10. IF YOU CHECKED OFF ANY PROBLEMS, HOW DIFFICULT HAVE THESE PROBLEMS MADE IT FOR YOU TO DO YOUR WORK, TAKE CARE OF THINGS AT HOME, OR GET ALONG WITH OTHER PEOPLE: EXTREMELY DIFFICULT

## 2023-03-27 NOTE — PROGRESS NOTES
[facility]                                     Progress Note    Patient Name: Radha Beckwith  Date: 3/27/23         Service Type: Individual      Session Start Time: 1500  Session End Time: 1552     Session Length: 52    Session #: 24    Attendees: Client attended alone    Service Modality:  Video Visit:      Provider verified identity through the following two step process.  Patient provided:  Patient is known previously to provider    Telemedicine Visit: The patient's condition can be safely assessed and treated via synchronous audio and visual telemedicine encounter.      Reason for Telemedicine Visit: Patient has requested telehealth visit    Originating Site (Patient Location): Patient's home    Distant Site (Provider Location): Provider Remote Setting- Home Office    Consent:  The patient/guardian has verbally consented to: the potential risks and benefits of telemedicine (video visit) versus in person care; bill my insurance or make self-payment for services provided; and responsibility for payment of non-covered services.     Patient would like the video invitation sent by:  Send to e-mail at: [email or web address]    Mode of Communication:  [facility] Conference [facility]    Distant Location (Provider):  Off-site    As the provider I attest to compliance with applicable laws and regulations related to telemedicine.    DATA  Interactive Complexity: No  Crisis: No        Progress Since Last Session (Related to Symptoms / Goals / Homework):  Symptoms:  Improving. Continuing to journal. Mood improves when able to express feelings on paper.Patient stated, that she is continuing pursue SS Disability.       Homework: Partially completed  She had followed up with her Dentist re: cost. Contacted  regarding sliding scale       Episode of Care Goals: Satisfactory progress - PREPARATION (Decided to change - considering how); Intervened by negotiating a change plan and determining options  / strategies for behavior change, identifying triggers, exploring social supports, and working towards setting a date to begin behavior change     Current / Ongoing Stressors and Concerns:   Related to a try to manage a number of stressors in her life related to back pain, applying for Social Security disability managing depression.     Treatment Objective(s) Addressed in This Session:   During today's session we discussed those areas that she can control versus things out of her control.  Patient indicated that she has a number of medical appointments coming up although, indicated she has not had a physical for quite some time.  It was suggested that she talk to her PCP regarding that option as well as the questions she has in reference to the past x-rays, labs and other medical concerns.     Intervention:   CBT, solution focused therapy              Time -line of her life    Assessments completed prior to visit:  The following assessments were completed by patient for this visit:  PHQ9:   PHQ-9 SCORE 10/4/2022 11/8/2022 12/22/2022 1/1/2023 2/7/2023 2/14/2023 3/27/2023   PHQ-9 Total Score MyChart 18 (Moderately severe depression) 18 (Moderately severe depression) 18 (Moderately severe depression) 18 (Moderately severe depression) 18 (Moderately severe depression) 16 (Moderately severe depression) 16 (Moderately severe depression)   PHQ-9 Total Score 18 18 18 18 18 16 16     GAD7:   BO-7 SCORE 1/1/2023 2/14/2023 2/14/2023 2/14/2023 2/14/2023 2/14/2023 3/25/2023   Total Score 20 (severe anxiety) - - - - 17 (severe anxiety) 16 (severe anxiety)   Total Score 20 17 17 17 17 17 16     PROMIS 10-Global Health (all questions and answers displayed):   PROMIS 10 10/26/2022 1/25/2023 2/7/2023   In general, would you say your health is: Fair Poor Poor   In general, would you say your quality of life is: Poor Poor Poor   In general, how would you rate your physical health? Poor Poor Poor   In general, how would you rate your  mental health, including your mood and your ability to think? Fair Fair Poor   In general, how would you rate your satisfaction with your social activities and relationships? Poor Fair Poor   In general, please rate how well you carry out your usual social activities and roles Poor Fair Poor   To what extent are you able to carry out your everyday physical activities such as walking, climbing stairs, carrying groceries, or moving a chair? A little A little A little   How often have you been bothered by emotional problems such as feeling anxious, depressed or irritable? Always Always Always   How would you rate your fatigue on average? Moderate Severe Severe   How would you rate your pain on average?   0 = No Pain  to  10 = Worst Imaginable Pain 8 8 9   In general, would you say your health is: 2 1 1   In general, would you say your quality of life is: 1 1 1   In general, how would you rate your physical health? 1 1 1   In general, how would you rate your mental health, including your mood and your ability to think? 2 2 1   In general, how would you rate your satisfaction with your social activities and relationships? 1 2 1   In general, please rate how well you carry out your usual social activities and roles. (This includes activities at home, at work and in your community, and responsibilities as a parent, child, spouse, employee, friend, etc.) 1 2 1   To what extent are you able to carry out your everyday physical activities such as walking, climbing stairs, carrying groceries, or moving a chair? 2 2 2   In the past 7 days, how often have you been bothered by emotional problems such as feeling anxious, depressed, or irritable? 5 5 5   In the past 7 days, how would you rate your fatigue on average? 3 4 4   In the past 7 days, how would you rate your pain on average, where 0 means no pain, and 10 means worst imaginable pain? 8 8 9   Global Mental Health Score 5 6 4   Global Physical Health Score 8 7 7   PROMIS  TOTAL - SUBSCORES 13 13 11   Some recent data might be hidden         ASSESSMENT: Current Emotional / Mental Status (status of significant symptoms):   Risk status (Self / Other harm or suicidal ideation)   Patient denies current fears or concerns for personal safety.   Patient denies current or recent suicidal ideation or behaviors.   Patient denies current or recent homicidal ideation or behaviors.   Patient denies current or recent self injurious behavior or ideation.   Patient denies other safety concerns.   Patient reports there has been no change in risk factors since their last session.     Patient reports there has been no change in protective factors since their last session.     Recommended that patient call 911 or go to the local ED should there be a change in any of these risk factors.     Appearance:   Appropriate    Eye Contact:   Good    Psychomotor Behavior: Normal    Attitude:   Cooperative    Orientation:   All   Speech    Rate / Production: Normal     Volume:  Normal    Mood:    Normal   Affect:    Appropriate    Thought Content:  Clear    Thought Form:  Coherent  Logical    Insight:    Good      Medication Review:   No changes to current psychiatric medication(s)     Medication Compliance:   Yes     Changes in Health Issues:   None reported     Chemical Use Review:   Substance Use: Chemical use reviewed, no active concerns identified      Tobacco Use: No current tobacco use.      Diagnosis:  Generalized anxiety disorder    Collateral Reports Completed:   Routed note to PCP    PLAN: (Patient Tasks / Therapist Tasks / Other)  1. Continued self care strategies.  2. Continue current medication regime.   3. Stop thought processing techniques.  4. Pt. will speak will her Md. regarding Lyrica   5. Recc. The Book (How did I get Here?  6. Might be applying for Social Security       Swetha Weinberg [facility]  3/27/23                                                          ______________________________________________________________________  Individual Treatment Plan     Patient's Name: Radha Beckwith                    YOB: 1975     Date of Creation: 12/5/22  Date Treatment Plan Last Reviewed/Revised:      DSM5 Diagnoses: Generalized anxiety disorder  Psychosocial / Contextual Factors: Current stressors with holiday season coming up and medical concerns.  PROMIS (reviewed every 90 days):      Referral / Collaboration:  Referral to another professional/service is not indicated at this time..     Anticipated number of session for this episode of care: 6-9 sessions  Anticipation frequency of session: Biweekly  Anticipated Duration of each session: 38-52 minutes  Treatment plan will be reviewed in 90 days or when goals have been changed.         MeasurableTreatment Goal(s) related to diagnosis / functional impairment(s)  MeasurableTreatment Goal(s) related to diagnosis / functional impairment(s)  Goal 1: Patient will focus on recognizing and managing symptoms of anxiety as evidenced by a BO-7 score of 5 or less    I will know I've met my goal when I do not have to call and reach out for extra support from the clinic.       Objective #A (Patient Action)                          Patient will practice deep breathing at least 3-5 a day.  Status: New - Date 12/5/22     Objective #B  Patient will attend and participate in social or recreational activities At least 3 times per week.  Status: New -  Date 12/5/22        Objective #C  Patient will use at least 5 coping skills for anxiety management in the next 12 weeks.  Status: New  - Date 12/5/22        Objective #D                Patient will use thought-stopping strategy daily to reduce intrusive thoughts.  Status: New - Date: 12/5/22     Objective #E  Patient will use distraction each time intrusive worry surfaces.                       Status: New - Date: 12/5/22     Objective #F  Patient will Improve quantity and  quality of night time sleep / decrease daytime naps.  Status: New - Date: New 12/5/22      Intervention(s)  Therapist will teach CBT skills to challenge cognitive distortions and core beliefs.  Therapist will teach and model positive self-talk behaviors.  Therapist will use psychodynamic approaches to explore early attachments and schemas.  Therapist will teach DBT mindfulness and emotion regulation skills.                Patient has reviewed and agreed to the above plan.     Swetha Weinberg [facility]                December 5, 2022  Answers for HPI/ROS submitted by the patient on 3/27/2023  If you checked off any problems, how difficult have these problems made it for you to do your work, take care of things at home, or get along with other people?: Extremely difficult  PHQ9 TOTAL SCORE: 16  BO 7 TOTAL SCORE: 16

## 2023-04-04 DIAGNOSIS — F51.04 PSYCHOPHYSIOLOGICAL INSOMNIA: ICD-10-CM

## 2023-04-04 RX ORDER — ZOLPIDEM TARTRATE 5 MG/1
TABLET ORAL
Qty: 30 TABLET | Refills: 0 | Status: SHIPPED | OUTPATIENT
Start: 2023-04-04 | End: 2023-05-04

## 2023-04-10 ENCOUNTER — VIRTUAL VISIT (OUTPATIENT)
Dept: BEHAVIORAL HEALTH | Facility: CLINIC | Age: 48
End: 2023-04-10
Payer: COMMERCIAL

## 2023-04-10 DIAGNOSIS — F41.1 GAD (GENERALIZED ANXIETY DISORDER): Primary | ICD-10-CM

## 2023-04-10 PROCEDURE — 90834 PSYTX W PT 45 MINUTES: CPT | Mod: VID | Performed by: SOCIAL WORKER

## 2023-04-10 NOTE — PROGRESS NOTES
[facility]                                     Progress Note    Patient Name: Radha Beckwith  Date: 4/10/23         Service Type: Individual      Session Start Time: 1500  Session End Time: 1552     Session Length: 52    Session #: 25    Attendees: Client    Service Modality:  Video Visit:      Provider verified identity through the following two step process.  Patient provided:  Patient is known previously to provider    Telemedicine Visit: The patient's condition can be safely assessed and treated via synchronous audio and visual telemedicine encounter.      Reason for Telemedicine Visit: Patient has requested telehealth visit    Originating Site (Patient Location): Patient's home    Distant Site (Provider Location): Provider Remote Setting- Home Office    Consent:  The patient/guardian has verbally consented to: the potential risks and benefits of telemedicine (video visit) versus in person care; bill my insurance or make self-payment for services provided; and responsibility for payment of non-covered services.     Patient would like the video invitation sent by:  Send to e-mail at: [email or web address]    Mode of Communication:  [facility]    Distant Location (Provider):  Off-site    As the provider I attest to compliance with applicable laws and regulations related to telemedicine.    DATA  Interactive Complexity: No  Crisis: No        Progress Since Last Session (Related to Symptoms / Goals / Homework):   Symptoms: slight improvement. Becoming more aware of her needs and wants in her life.     Homework: Achieved / completed to satisfactionFollowed through with seeing a MD re: Disability.      Episode of Care Goals: Satisfactory progress - ACTION (Actively working towards change); Intervened by reinforcing change plan / affirming steps taken     Current / Ongoing Stressors and Concerns:   Planning on having all of her teeth pulled.  20 manage elements of her overall  health and wellbeing.     Treatment Objective(s) Addressed in This Session:   During today's session discussed ways in which she can prioritize areas of concern such as, medical issues, relationship concerns and mental health issues. Pt. planning on seeing her primary MD with various lists of questions she has for him. Patient will attempt to formulate questions ahead of time for her MD in order to have a more productive appointment. Pt. appearing much more assertive in verbalizing needs and wants.      Intervention:   CBT, solution focused therapy              Time -line of her life    Assessments completed prior to visit:  The following assessments were completed by patient for this visit:  PHQ9:       10/4/2022    10:45 AM 11/8/2022    11:16 AM 12/22/2022    10:19 AM 1/1/2023    11:37 AM 2/7/2023    12:54 PM 2/14/2023    11:36 AM 3/27/2023     2:53 PM   PHQ-9 SCORE   PHQ-9 Total Score MyChart 18 (Moderately severe depression) 18 (Moderately severe depression) 18 (Moderately severe depression) 18 (Moderately severe depression) 18 (Moderately severe depression) 16 (Moderately severe depression) 16 (Moderately severe depression)   PHQ-9 Total Score 18 18 18 18 18 16 16     GAD7:       8/30/2022     5:00 PM 10/26/2022    12:47 PM 10/26/2022    12:48 PM 12/4/2022     9:53 PM 1/1/2023    11:37 AM 2/14/2023    11:38 AM 3/25/2023     3:01 PM   BO-7 SCORE   Total Score 16 (severe anxiety)  16 (severe anxiety) 19 (severe anxiety) 20 (severe anxiety) 17 (severe anxiety) 16 (severe anxiety)   Total Score 16 16  19 20 17    17    17    17    17 16     PROMIS 10-Global Health (all questions and answers displayed):       10/26/2022    12:50 PM 1/25/2023    12:48 PM 2/7/2023    12:57 PM   PROMIS 10   In general, would you say your health is: Fair Poor Poor   In general, would you say your quality of life is: Poor Poor Poor   In general, how would you rate your physical health? Poor Poor Poor   In general, how would you rate  your mental health, including your mood and your ability to think? Fair Fair Poor   In general, how would you rate your satisfaction with your social activities and relationships? Poor Fair Poor   In general, please rate how well you carry out your usual social activities and roles Poor Fair Poor   To what extent are you able to carry out your everyday physical activities such as walking, climbing stairs, carrying groceries, or moving a chair? A little A little A little   In the past 7 days, how often have you been bothered by emotional problems such as feeling anxious, depressed, or irritable? Always Always Always   In the past 7 days, how would you rate your fatigue on average? Moderate Severe Severe   In the past 7 days, how would you rate your pain on average, where 0 means no pain, and 10 means worst imaginable pain? 8 8 9   In general, would you say your health is: 2 1 1   In general, would you say your quality of life is: 1 1 1   In general, how would you rate your physical health? 1 1 1   In general, how would you rate your mental health, including your mood and your ability to think? 2 2 1   In general, how would you rate your satisfaction with your social activities and relationships? 1 2 1   In general, please rate how well you carry out your usual social activities and roles. (This includes activities at home, at work and in your community, and responsibilities as a parent, child, spouse, employee, friend, etc.) 1 2 1   To what extent are you able to carry out your everyday physical activities such as walking, climbing stairs, carrying groceries, or moving a chair? 2 2 2   In the past 7 days, how often have you been bothered by emotional problems such as feeling anxious, depressed, or irritable? 5 5 5   In the past 7 days, how would you rate your fatigue on average? 3 4 4   In the past 7 days, how would you rate your pain on average, where 0 means no pain, and 10 means worst imaginable pain? 8 8 9    Global Mental Health Score 5 6 4   Global Physical Health Score 8 7 7   PROMIS TOTAL - SUBSCORES 13 13 11         ASSESSMENT: Current Emotional / Mental Status (status of significant symptoms):   Risk status (Self / Other harm or suicidal ideation)   Patient denies current fears or concerns for personal safety.   Patient denies current or recent suicidal ideation or behaviors.   Patient denies current or recent homicidal ideation or behaviors.   Patient denies current or recent self injurious behavior or ideation.   Patient denies other safety concerns.   Patient reports there has been no change in risk factors since their last session.     Patient reports there has been no change in protective factors since their last session.     Recommended that patient call 911 or go to the local ED should there be a change in any of these risk factors.     Appearance:   Appropriate    Eye Contact:   Good    Psychomotor Behavior: Normal    Attitude:   Cooperative    Orientation:   All   Speech    Rate / Production: Normal     Volume:  Normal    Mood:    Normal   Affect:    Appropriate    Thought Content:  Clear    Thought Form:  Coherent  Logical    Insight:    Good      Medication Review:   No changes to current psychiatric medication(s)     Medication Compliance:   Yes     Changes in Health Issues:   Going in for a shot in her back.     Chemical Use Review:   Substance Use: Chemical use reviewed, no active concerns identified      Tobacco Use: No current tobacco use.      Diagnosis:  Generalized anxiety disorder       Collateral Reports Completed:   Routed note to PCP    PLAN: (Patient Tasks / Therapist Tasks / Other)        Swetha Weinberg [facility]  4/10/23                                                         ______________________________________________________________________  Individual Treatment Plan     Patient's Name: Radha Beckwith                    YOB: 1975     Date of Creation: 12/5/22  Date  Treatment Plan Last Reviewed/Revised: 4/10/2023     DSM5 Diagnoses: Generalized anxiety disorder  Psychosocial / Contextual Factors: Current stressors with holiday season coming up and medical concerns.  PROMIS (reviewed every 90 days):      Referral / Collaboration:  Referral to another professional/service is not indicated at this time..     Anticipated number of session for this episode of care: 6-9 sessions  Anticipation frequency of session: Biweekly  Anticipated Duration of each session: 38-52 minutes  Treatment plan will be reviewed in 90 days or when goals have been changed.         MeasurableTreatment Goal(s) related to diagnosis / functional impairment(s)  MeasurableTreatment Goal(s) related to diagnosis / functional impairment(s)  Goal 1: Patient will focus on recognizing and managing symptoms of anxiety as evidenced by a BO-7 score of 5 or less    I will know I've met my goal when I do not have to call and reach out for extra support from the clinic.       Objective #A (Patient Action)                          Patient will practice deep breathing at least 3-5 a day.  Status: Continued Date 4/10/23     Objective #B  Patient will attend and participate in social or recreational activities At least 3 times per week.  Status: Continued Date 4/10/23       Objective #C  Patient will use at least 5 coping skills for anxiety management in the next 12 weeks.  Status: Continued Date 4/10/23            Objective #D                Patient will use thought-stopping strategy daily to reduce intrusive thoughts.  Status:Continued Date 4/10/23         Objective #E  Patient will use distraction each time intrusive worry surfaces.                       Status: Continued Date 4/10/23         Objective #F  Patient will Improve quantity and quality of night time sleep / decrease daytime naps.  Status: Continued Date 4/10/23         Intervention(s)  Therapist will teach CBT skills to challenge cognitive distortions and core  beliefs.  Therapist will teach and model positive self-talk behaviors.  Therapist will use psychodynamic approaches to explore early attachments and schemas.  Therapist will teach DBT mindfulness and emotion regulation skills.                Patient has reviewed and agreed to the above plan.   Continued Date 4/10/23      Swetha Weinberg [facility]        4/10/23           Answers for HPI/ROS submitted by the patient on 3/27/2023  If you checked off any problems, how difficult have these problems made it for you to do your work, take care of things at home, or get along with other people?: Extremely difficult  PHQ9 TOTAL SCORE: 16  BO 7 TOTAL SCORE: 16

## 2023-04-16 ENCOUNTER — HEALTH MAINTENANCE LETTER (OUTPATIENT)
Age: 48
End: 2023-04-16

## 2023-04-17 DIAGNOSIS — F41.9 ANXIETY: ICD-10-CM

## 2023-04-17 DIAGNOSIS — F51.04 PSYCHOPHYSIOLOGICAL INSOMNIA: ICD-10-CM

## 2023-04-17 ASSESSMENT — ANXIETY QUESTIONNAIRES
8. IF YOU CHECKED OFF ANY PROBLEMS, HOW DIFFICULT HAVE THESE MADE IT FOR YOU TO DO YOUR WORK, TAKE CARE OF THINGS AT HOME, OR GET ALONG WITH OTHER PEOPLE?: EXTREMELY DIFFICULT
6. BECOMING EASILY ANNOYED OR IRRITABLE: NEARLY EVERY DAY
7. FEELING AFRAID AS IF SOMETHING AWFUL MIGHT HAPPEN: NEARLY EVERY DAY
1. FEELING NERVOUS, ANXIOUS, OR ON EDGE: NEARLY EVERY DAY
IF YOU CHECKED OFF ANY PROBLEMS ON THIS QUESTIONNAIRE, HOW DIFFICULT HAVE THESE PROBLEMS MADE IT FOR YOU TO DO YOUR WORK, TAKE CARE OF THINGS AT HOME, OR GET ALONG WITH OTHER PEOPLE: EXTREMELY DIFFICULT
7. FEELING AFRAID AS IF SOMETHING AWFUL MIGHT HAPPEN: NEARLY EVERY DAY
2. NOT BEING ABLE TO STOP OR CONTROL WORRYING: NEARLY EVERY DAY
5. BEING SO RESTLESS THAT IT IS HARD TO SIT STILL: SEVERAL DAYS
7. FEELING AFRAID AS IF SOMETHING AWFUL MIGHT HAPPEN: NEARLY EVERY DAY
7. FEELING AFRAID AS IF SOMETHING AWFUL MIGHT HAPPEN: NEARLY EVERY DAY
GAD7 TOTAL SCORE: 15
8. IF YOU CHECKED OFF ANY PROBLEMS, HOW DIFFICULT HAVE THESE MADE IT FOR YOU TO DO YOUR WORK, TAKE CARE OF THINGS AT HOME, OR GET ALONG WITH OTHER PEOPLE?: EXTREMELY DIFFICULT
6. BECOMING EASILY ANNOYED OR IRRITABLE: NEARLY EVERY DAY
GAD7 TOTAL SCORE: 15
GAD7 TOTAL SCORE: 15
1. FEELING NERVOUS, ANXIOUS, OR ON EDGE: NEARLY EVERY DAY
GAD7 TOTAL SCORE: 15
4. TROUBLE RELAXING: SEVERAL DAYS
GAD7 TOTAL SCORE: 15
2. NOT BEING ABLE TO STOP OR CONTROL WORRYING: NEARLY EVERY DAY
3. WORRYING TOO MUCH ABOUT DIFFERENT THINGS: SEVERAL DAYS
5. BEING SO RESTLESS THAT IT IS HARD TO SIT STILL: SEVERAL DAYS
4. TROUBLE RELAXING: SEVERAL DAYS
GAD7 TOTAL SCORE: 15
3. WORRYING TOO MUCH ABOUT DIFFERENT THINGS: SEVERAL DAYS
IF YOU CHECKED OFF ANY PROBLEMS ON THIS QUESTIONNAIRE, HOW DIFFICULT HAVE THESE PROBLEMS MADE IT FOR YOU TO DO YOUR WORK, TAKE CARE OF THINGS AT HOME, OR GET ALONG WITH OTHER PEOPLE: EXTREMELY DIFFICULT

## 2023-04-17 ASSESSMENT — PATIENT HEALTH QUESTIONNAIRE - PHQ9
SUM OF ALL RESPONSES TO PHQ QUESTIONS 1-9: 18
10. IF YOU CHECKED OFF ANY PROBLEMS, HOW DIFFICULT HAVE THESE PROBLEMS MADE IT FOR YOU TO DO YOUR WORK, TAKE CARE OF THINGS AT HOME, OR GET ALONG WITH OTHER PEOPLE: EXTREMELY DIFFICULT
10. IF YOU CHECKED OFF ANY PROBLEMS, HOW DIFFICULT HAVE THESE PROBLEMS MADE IT FOR YOU TO DO YOUR WORK, TAKE CARE OF THINGS AT HOME, OR GET ALONG WITH OTHER PEOPLE: EXTREMELY DIFFICULT

## 2023-04-18 ENCOUNTER — VIRTUAL VISIT (OUTPATIENT)
Dept: PSYCHIATRY | Facility: CLINIC | Age: 48
End: 2023-04-18
Attending: NURSE PRACTITIONER
Payer: COMMERCIAL

## 2023-04-18 ENCOUNTER — VIRTUAL VISIT (OUTPATIENT)
Dept: BEHAVIORAL HEALTH | Facility: CLINIC | Age: 48
End: 2023-04-18
Payer: COMMERCIAL

## 2023-04-18 DIAGNOSIS — F33.1 MODERATE EPISODE OF RECURRENT MAJOR DEPRESSIVE DISORDER (H): Primary | ICD-10-CM

## 2023-04-18 DIAGNOSIS — F41.1 GAD (GENERALIZED ANXIETY DISORDER): ICD-10-CM

## 2023-04-18 DIAGNOSIS — F41.1 GENERALIZED ANXIETY DISORDER: ICD-10-CM

## 2023-04-18 DIAGNOSIS — F13.20 BENZODIAZEPINE DEPENDENCE (H): ICD-10-CM

## 2023-04-18 PROCEDURE — 90791 PSYCH DIAGNOSTIC EVALUATION: CPT | Mod: VID | Performed by: SOCIAL WORKER

## 2023-04-18 PROCEDURE — 99204 OFFICE O/P NEW MOD 45 MIN: CPT | Mod: VID | Performed by: STUDENT IN AN ORGANIZED HEALTH CARE EDUCATION/TRAINING PROGRAM

## 2023-04-18 RX ORDER — QUETIAPINE FUMARATE 100 MG/1
TABLET, FILM COATED ORAL
Qty: 42 TABLET | Refills: 0 | Status: SHIPPED | OUTPATIENT
Start: 2023-04-18 | End: 2023-04-26 | Stop reason: DRUGHIGH

## 2023-04-18 NOTE — PROGRESS NOTES
"[facility] Psychiatry Services - [facility] Behavioral Health Services   Diagnostic Assessment Update      PATIENT'S NAME: Radha Sue  Pronouns:  She/her  MRN:   [id number]  :   1975  DATE OF SERVICE: 2023  Start Time:  10:03 am  End Time:  10:51 am  SERVICE LOCATION: MyChart / Email (patient reached)  Visit Activities: NEW and [facility] Only     Telemedicine Visit: The patient's condition can be safely assessed and treated via synchronous audio and visual telemedicine encounter.      Reason for Telemedicine Visit: Services only offered telehealth    Originating Site (Patient Location): Patient's home        Distant Location (provider location):  Off-site    Consent:  The patient/guardian has verbally consented to: the potential risks and benefits of telemedicine (video visit) versus in person care; bill my insurance or make self-payment for services provided; and responsibility for payment of non-covered services.     Mode of Communication:  Video Conference via Tremor Video    As the provider I attest to compliance with applicable laws and regulations related to telemedicine.    Identifying Information:  Patient is a 47 year old year old, , single female.  Patient attended the session alone.      [facility] appointment with patient [facility] and was advised of the short-term, team based structure of the model including role of [facility] and provider. Patient indicated understanding of the model and agreed to proceed with services as described.    [place] [facility] [place].    Updates on Presenting Concern(s):  Patient reports the following reason(s) for continuing to receive behavioral services: \"crabby, irritable.\" Chronic pain in back and wrists. Has tried everything recommended by providers. Completed physical therapy, injections. Pain issues cause limitations with being able to do things around the house, help with chores or meal prep " (even the little things), not able to work. Forces self to get things done and causes more pain. Has to find a job because financial issues, utilities being shut off. Able to access help but limited or has to wait. Frustrations and pain causes episodes of crying. No motivation or interest to do anything. No energy to do things or pain becomes a barrier. Not able to concentrate on things. 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 Hoped that things would improve after her passing in September 2019 (go out and do things again) but has not seen improvement. Mental health worsened about in September 2020 and very little progress since then.    Long history of mental health. Depression and anxiety since childhood/teenager. Started seeking help after the birth of her son (25 years ago). Has tried therapy and medications. Inpatient for two weeks over 15 to 20 years ago. Has not tried day treatment or IOP.     Friend, Yan, moved in to help with finances and keeping up with the home.    Patient reported that her symptoms and concerns are not changing.  Patient attributed the status of her current symptoms to continued chronic health issues and ongoing stressors.      Concerns with psychiatry due to prior poor experiences with her son.    Review of Symptoms per patient report:   Depression: Change in sleep, Lack of interest, Change in energy level, Difficulties concentrating, Psychomotor slowing or agitation, Feelings of hopelessness, Feelings of helplessness, Low self-worth, Ruminations, Irritability, Feeling sad, down, or depressed, Withdrawn and Frequent crying  Laura:  No Symptoms Fam hx Bipolar: son told but not agree with dx  Psychosis: No Symptoms  Anxiety: Excessive worry, Nervousness, Physical complaints, such as headaches, stomachaches, muscle tension, Social anxiety, Ruminations, Poor concentration and Irritability prefers to stay home  Panic:  Palpitations, Shortness of breath  and Hot or cold flashes thought might be heart attacks in the past- went to ED. Happens depending on day. Triggered by appointments, social situations, medical complications, etc. Uses Xanax to manage.  Post Traumatic Stress Disorder:  Experienced traumatic event watching family members die (all expected), son and his mental health issues, Reexperiencing of trauma, Avoids traumatic stimuli, Impaired functioning and Nightmares not dx but thinks she has  Eating Disorder: No Symptoms  ADD / ADHD:  No symptoms  Conduct Disorder: No symptoms  Autism Spectrum Disorder: No symptoms  Obsessive Compulsive Disorder: No Symptoms    Patient reports the following compulsive behaviors and treatment history: None.      Sleep: tends to sleep in to late morning. Hard time falling and staying asleep. Related to pain and physical discomfort. Takes Ambien and melatonin, and Requip.  Appetite: normal for her    SH: denies current or past  SI: denies current. Past thoughts and had an attempt several years ago (20 years). Tried to overdose on medication. Police found her (living in a tent) and taken to the hospital. ICU for 5 days and then admitted to inpatient unit for 2 weeks. [place]. No other attempts. No thoughts, plans and intent at this time. Feels safe. Discussed crisis resources.    Stressors: ongoing health issues, chronic pain    Tx: [place] with [facility]. Since October 2022. Good connection. Trusts her. Wanting to work on specific needs. Goals to open up.     Patient stated that her symptoms have resulted in the following functional impairments: chronic disease management, management of the household and or completion of tasks, relationship(s), self-care and social interactions    Patient reports that other professional(s) are involved in providing support / services.      Standard Screening tools completed, including PHQ9 and GAD7.  See Epic for today's results.  Historical PHQ9:      2/14/2023    11:36 AM 3/27/2023     2:53 PM  2023    12:41 PM   PHQ-9 SCORE   PHQ-9 Total Score Amanda 16 (Moderately severe depression) 16 (Moderately severe depression) 18 (Moderately severe depression)   PHQ-9 Total Score 16 16 18    18     Historical GAD7:      2023    11:38 AM 3/25/2023     3:01 PM 2023    12:45 PM   BO-7 SCORE   Total Score 17 (severe anxiety) 16 (severe anxiety) 15 (severe anxiety)   Total Score 17    17    17    17    17 16 15    15       Review of Updates to Patient's Life Situation:  Patient reported no significant changes to their relationships and identified support(s).  Patient identified no changes in the stability of their social connections and identified supports. Patient noted that their living situation did not change within the last year.     Patient's employment status did not change within the past year and remains unemployed and disabled.     Patient reported no changes or new involvment with the legal system.  There are no ethnic, cultural or Mandaen factors that may be relevant for therapy.       Mental Health History:  Patient is currently receiving the following services: counseling.  Patient sees a therapist through [facility].    Chemical Health History:   Patient is currently receiving the following services: Patient is on Suboxone for pain management. Patient reports no problems as a result of their drinking / drug use.    Etoh: every 2-3 weeks, more in the summer. 2-3 drinks at a time.  Substance: denies. No prior history  Nicotine: smokes 5-10 cigs a day. Was a full pack a day but trying to cut back.  Caffeine: 3 cups     Cage-AID score is: 0   Based on Cage-Aid score and clinical interview there  are not indications of drug or alcohol abuse.      Discussed the general effects of drugs and alcohol on health and well-being.      Significant Losses / Trauma / Abuse / Neglect Issues:  There are no no indications or report of: significant losses, trauma, abuse or neglect.  Patient's mother  in  September 2019 but this was expected.  Patient feels that her grief is stable at this time.    Issues of possible neglect are not present.      Medical History:   Patient Active Problem List   Diagnosis     GERD (gastroesophageal reflux disease)     Restless legs     Anxiety     Hyperlipidemia LDL goal <100     Hypokalemia     Tobacco abuse     Anxiety attack     Opioid dependence in remission (H)     Psychophysiological insomnia     Chronic bilateral low back pain without sciatica     Moderate major depression (H)     Supraventricular tachycardia (H)     Moderate episode of recurrent major depressive disorder (H)       Medication Review:  Current Outpatient Medications   Medication     acetaminophen (TYLENOL) 500 MG tablet     ALPRAZolam (XANAX) 1 MG tablet     bisacodyl (DULCOLAX) 5 MG EC tablet     buprenorphine HCl-naloxone HCl (SUBOXONE) 2-0.5 MG per film     cetirizine (ZYRTEC) 10 MG tablet     cyclobenzaprine (FLEXERIL) 5 MG tablet     DULoxetine (CYMBALTA) 30 MG capsule     famotidine (PEPCID) 40 MG tablet     lactulose (CHRONULAC) 10 GM/15ML solution     Melatonin 10 MG TABS tablet     ondansetron (ZOFRAN ODT) 4 MG ODT tab     pantoprazole (PROTONIX) 40 MG EC tablet     polyethylene glycol (MIRALAX) 17 GM/Dose powder     QUEtiapine (SEROQUEL) 200 MG tablet     rOPINIRole (REQUIP) 1 MG tablet     simvastatin (ZOCOR) 10 MG tablet     sucralfate (CARAFATE) 1 GM tablet     Turmeric 500 MG CAPS     zolpidem (AMBIEN) 5 MG tablet     No current facility-administered medications for this visit.       Medication Compliance:  Yes    Patient was provided recommendation to follow-up with physician.      Mental Status Assessment:  Appearance:   Appropriate   Eye Contact:   Good   Psychomotor Behavior: Normal   Attitude:   Cooperative  Interested  Orientation:   All  Speech   Rate / Production: Normal    Volume:  Normal   Mood:    Anxious  Depressed   Affect:    Labile   Thought Content:  Clear   Thought Form:  Coherent   Logical   Insight:    Good       Safety Assessment:    Patient has had a history of suicidal ideation: Patient reports a history of suicidal ideation. and suicide attempts: Patient had an attempt by overdose 15 to 20 years ago and was hospitalized at the time  Patient denies current or recent suicidal ideation or behaviors.  Patient denies current or recent homicidal ideation or behaviors.  Patient denies current or recent self injurious behavior or ideation.  Patient denies other safety concerns.  Patient reports there are no firearms in the house  Protective Factors Reality testing ability, Positive problem-solving skills and Positive therapeutic releationships   Risk Factors Abrupt changes in clinical condition      Plan for Safety and Risk Management:  A safety and risk management plan has not been developed at this time, however patient was encouraged to call [place] [phone] should there be a change in any of these risk factors.      Patient's Strengths and Limitations:  Patient identified the following strengths or resources that will help her succeed in counseling: friends / good social support. Patient identified the following supports: friends. Things that may interfere with the patient's success in counseling include:few friends, lack of family support and physical health concerns.    Diagnostic Criteria:  Generalized Anxiety Disorder  A. Excessive anxiety and worry about a number of events or activities (such as work or school performance).   B. The person finds it difficult to control the worry.  C. Select 3 or more symptoms (required for diagnosis). Only one item is required in children.   - Restlessness or feeling keyed up or on edge.    - Being easily fatigued.    - Difficulty concentrating or mind going blank.    - Irritability.    - Muscle tension.    - Sleep disturbance (difficulty falling or staying asleep, or restless unsatisfying sleep).   D. The focus of the anxiety and worry is not  confined to features of an Axis I disorder.  E. The anxiety, worry, or physical symptoms cause clinically significant distress or impairment in social, occupational, or other important areas of functioning.   F. The disturbance is not due to the direct physiological effects of a substance (e.g., a drug of abuse, a medication) or a general medical condition (e.g., hyperthyroidism) and does not occur exclusively during a Mood Disorder, a Psychotic Disorder, or a Pervasive Developmental Disorder.    - The aformentioned symptoms began Several year(s) ago and occurs 7 days per week and is experienced as moderate.  Major Depressive Disorder  CRITERIA (A-C) REPRESENT A MAJOR DEPRESSIVE EPISODE - SELECT THESE CRITERIA  A) Recurrent episode(s) - symptoms have been present during the same 2-week period and represent a change from previous functioning 5 or more symptoms (required for diagnosis)   - Depressed mood. Note: In children and adolescents, can be irritable mood.     - Diminished interest or pleasure in all, or almost all, activities.    - Psychomotor activity retardation.    - Fatigue or loss of energy.    - Feelings of worthlessness or inappropriate and excessive guilt.    - Diminished ability to think or concentrate, or indecisiveness.    - Recurrent thoughts of death (not just fear of dying), recurrent suicidal ideation without a specific plan, or a suicide attempt or a specific plan for committing suicide.   B) The symptoms cause clinically significant distress or impairment in social, occupational, or other important areas of functioning  C) The episode is not attributable to the physiological effects of a substance or to another medical condition  D) The occurence of major depressive episode is not better explained by other thought / psychotic disorders  E) There has never been a manic episode or hypomanic episode      Functional Status:  Patient's symptoms are causing reduced functional status in the following  areas: Activities of Daily Living - Patient admits it is difficult for her to take care of herself or things around the home  Occupational / Vocational - Patient is unable to work at this time  Social / Relational - Patient has limited to no social interactions      DSM5 Diagnoses: (Sustained by DSM5 Criteria Listed Above)  Diagnoses: 296.32 (F33.1) Major Depressive Disorder, Recurrent Episode, Moderate _  300.02 (F41.1) Generalized Anxiety Disorder  Psychosocial & Contextual Factors: Patient lives with a roommate.  Patient has limited social and no family supports.  She is unable to work.    Preliminary Treatment Plan:    Treatment will focus on: Depressed Mood - Patient will report improvement in her mood and motivation  Anxiety - Patient will report a decrease in her anxiety especially as it relates to her medical needs and appointments.    The Following referrals were discussed and initiated: Patient is already connected with individual therapy and declines other needs at this time.    The patient is receiving treatment / structured support from the following professional(s) / service and treatment. Collaboration will be initiated with: primary care physician and outpatient therapist.    Referral to another professional/service is not indicated at this time.    A Release of Information is not needed at this time.    Report to child or adult protection services was NA.    Kandy Meier, [facility], Behavioral Health Clinician       Answers for HPI/ROS submitted by the patient on 4/17/2023  If you checked off any problems, how difficult have these problems made it for you to do your work, take care of things at home, or get along with other people?: Extremely difficult  PHQ9 TOTAL SCORE: 18  BO 7 TOTAL SCORE: 15

## 2023-04-18 NOTE — PATIENT INSTRUCTIONS
Zev Sue,    Thank you for our time together today in Collaborative Care Psychiatry Service (CCPS). CCPS provides brief psychiatric medication stabilization to patients referred by their Primary Care Providers. Patients are typically seen in CCPS for up to 4 appointments and then referred back to the PCP for ongoing refills unless longer term medication management by a specialist is indicated. If I believe you will benefit from long-term psychiatric care I will discuss this with you. If you are interested in seeing a psychiatrist or psychiatric nurse practitioner long-term, please send me a message in [employer] so we can refer you appropriately.     Treatment Plan:  Decrease quetiapine to 150mg nightly x 2 weeks then decrease to 100mg nightly x 2 weeks then decrease to 50mg nightly x 2 weeks then decrease to 25mg x 2 weeks then stop  Continue duloxetine 30mg nightly for pain  Continue alprazolam 1mg twice daily for anxiety  Continue Ambien 5mg at bedtime   Call [phone] to schedule follow up with me in 4 weeks.  You can call the above number to make appointments, leave a message with our nursing team, and inquire about any mental health referrals I have placed.  Please call your pharmacy to request a refill of your medicines listed above if needed between appointments.     JEANNIE Lehman  Collaborative Care Psychiatry Service  [facility]  What to Expect  Here's what to expect from your Collaborative Care Psychiatry Service (CCPS).   About CCPS  CCPS means 2 people work together to help you get better. You'll meet with a behavioral health clinician and a psychiatric doctor. A behavioral health clinician helps people with mental health problems by talking with them. A psychiatric doctor helps people by giving them medicine.  How it works  At every visit, you'll see the behavioral health clinician (BHC) first. They'll talk with you about how you're doing and  "teach you how to feel better.   Then you'll see the psychiatric doctor. This doctor can help you deal with troubling thoughts and feelings by giving you medicine. They'll make sure you know the plan for your care.   CCPS usually takes 3 to 6 visits. If you need more visits, we may have you start seeing a different psychiatric doctor for ongoing care.  If you have any questions or concerns, we'll be glad to talk with you.  About visits  Be open  At your visits, please talk openly about your problems. It may feel hard, but it's the best way for us to help you.  Cancelling visits  If you can't come to your visit, please call us right away at [phone]. If you don't cancel at least 24 hours (1 full day) before your visit, that's \"late cancellation.\"  Being late to visits  Being very late is the same as not showing up. You will be a \"no show\" if:  Your appointment starts with a [facility], and you're more than 15 minutes late for a 30-minute (half hour) visit. This will also cancel your appointment with the psychiatric doctor.  Your appointment is with a psychiatric doctor only, and you're more than 15 minutes late for a 30-minute (half hour) visit.  Your appointment is with a psychiatric doctor only, and you're more than 30 minutes late for a 60-minute (full hour) visit.  If you cancel late or don't show up 2 times within 6 months, we may end your care.   Getting help between visits  If you need help between visits, you can call us Monday to Friday from 8 a.m. to 4:30 p.m. at [phone].  Emergency care  Call 911 or go to the nearest emergency department if your life or someone else's life is in danger.  Call 118 anytime to reach the national Suicide and Crisis hotline.  Medicine refills  To refill your medicine, call your pharmacy. You can also call [facility]'s [facility] at [phone], Monday to Friday, 8 a.m. to 4:30 p.m. It can take 1 to 3 business days to get a refill.   Forms, letters, and " tests  You may have papers to fill out, like FMLA, short-term disability, and workability. We can help you with these forms at your visits, but you must have an appointment. You may need more than 1 visit for this, to be in an intensive therapy program, or both.  Before we can give you medicine for ADHD, we may refer you to get tested for it or confirm it another way.  We may not be able to give you an emotional support animal letter.  We don't do mental health checks ordered by the court.   We don't do mental health testing, but we can refer you to get tested.   Thank you for choosing us for your care.  For informational purposes only. Not to replace the advice of your health care provider. Copyright   2022 [facility]. All rights reserved. [employer] [id number] - 12/22.

## 2023-04-18 NOTE — NURSING NOTE
Is the patient currently in the state of [place]? YES    Visit mode:VIDEO    If the visit is dropped, the patient can be reconnected by: VIDEO VISIT: Text to cell phone: [phone]    Will anyone else be joining the visit? NO      How would you like to obtain your AVS? MyChart    Are changes needed to the allergy or medication list? NO    Reason for visit: Video Visit      Care team has reviewed attendance agreement with patient. Patient advised that two failed appointments within 6 months may lead to termination of current episode of care.     [facility]

## 2023-04-18 NOTE — PROGRESS NOTES
"Virtual Visit Details    Type of service:  Video Visit   Video Start Time: 10:58 AM  Video End Time:11:35 AM    Originating Location (pt. Location): Home    Distant Location (provider location):  Off-site  Platform used for Video Visit: [facility]       DIAGNOSTIC PSYCHIATRIC ASSESSMENT     Name:  Radha Beckwith  : 1975     Patient is a 47 year old year old White, female  who presents for intake and [facility]  Patient was referred by PCP.   Patient attended the session alone.     Patient care team: Patient Care Team:  Gisselle Linn NP as PCP - General (Nurse Practitioner - Family)  Gisselle Linn NP as Assigned PCP  Gisselle Linn NP as Referring Physician (Nurse Practitioner - Family)  Jose Haas MD as MD (Neurology)  Micha Hough MD as Assigned Surgical Provider  Jose Haas MD as Assigned Neuroscience Provider  Dwaine Patterson MD as Assigned Musculoskeletal Provider  Madelaine Arredondo APRN CNP as Nurse Practitioner  Jenny Landrum APRN CNP as Nurse Practitioner    Available records in [facility] and/or Care Everywhere were reviewed today.   Per PCP on date of referral: \"anxiety\"    LANGUAGE OR COMMUNICATION BARRIERS   Are there language or communication issues or need for modification in treatment? No   Are there ethnic, cultural or Muslim factors that may be relevant for therapy? No  Client identified their preferred language to be fluent English in conversational context  Does the client need the assistance of an  or other support involved in therapy? No                                                 CHIEF COMPLAINT   Patient is a 47 year old,  White, female who presents for initial psychiatric evaluation with the [facility] ([facility]) for evaluation of depression and anxiety.    HISTORY OF PRESENT ILLNESS     Per [facility] Kandy Meier during today's team-based visit:   \"Patient reports the following reason(s) for " "continuing to receive behavioral services: \"crabby, irritable.\" Chronic pain in back and wrists. Has tried everything recommended by providers. Completed physical therapy, injections. Pain issues cause limitations with being able to do things around the house, help with chores or meal prep (even the little things), not able to work. Forces self to get things done and causes more pain. Has to find a job because financial issues, utilities being shut off. Able to access help but limited or has to wait. Frustrations and pain causes episodes of crying. No motivation or interest to do anything. No energy to do things or pain becomes a barrier. Not able to concentrate on things. 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 Hoped that things would improve after her passing in September 2019 (go out and do things again) but has not seen improvement. Mental health worsened about in September 2020 and very little progress since then.  Long history of mental health. Depression and anxiety since childhood/teenager. Started seeking help after the birth of her son (25 years ago). Has tried therapy and medications. Inpatient for two weeks over 15 years ago. Has not tried day treatment or IOP.   Friend, Yan, moved in to help with finances and keeping up with the home.  Patient reported that her symptoms and concerns are not changing.  Patient attributed the status of her current symptoms to continued chronic health issues and ongoing stressors.    Concerns with psychiatry due to prior poor experiences with her son.  Sleep: tends to sleep in to late morning. Hard time falling and staying asleep. Related to pain and physical discomfort. Takes Ambien and melatonin, and Requip.  Appetite: normal for her  SH: denies current or past  SI: denies current. Past thoughts and had an attempt several years ago (20 years). Tried to overdose on medication. Police found her (living in a tent) and taken to the " "hospital. ICU for 5 days and then admitted to inpatient unit for 2 weeks. [place]. No other attempts. No thoughts, plans and intent at this time. Feels safe. Discussed crisis resources.  Stressors: ongoing health issues, chronic pain\"    ---Psychiatry Evaluation---    Pt agrees with assessment above. She took an alprazolam before this appt and speech is a bit slurred here and there in addition to pt appearing slightly drowsy.  Pt observed with eyes closed doing deep breathing for parts of evaluation. She agreed to this evaluation for consultation re: medications. Pt told her PCP that her experiences with psychiatry for her son's  care left a bad taste in her mouth.     Pt says her back pain affects sleep maintenance. She usually gets 3-4h/night with medicines below. Sometimes she wakes feeling rested. Some nights she gets six hours overnight but others she will get 8h if she's \"really over-exerted myself\" the day before.    Zolpidem 5mg at bedtime - helps with sleep onset insomnia  Alprazolam 1mg BID - PDMP notes dose increase from 0.5mg BID to 1mg BID in Sept 2022. Calms her down.   Duloxetine 30mg at bedtime - \"for nerves in hip and legs\", notices reduction in tingling and pain since starting this. Was taking it twice daily initially but wanted to try it once daily instead. She thinks she was concerned about weight gain.   Quetiapine 200mg at bedtime - \"seem to be working pretty good\"; easier time falling asleep. She has noticed \"a lot of weight gain\" - thinks 40# in the past 1.5 years. \"I'm not real happy about it.\"     She walks as much as she can to try to manage her weight gain though physical activity is decreased in the winter. Pt is also doing PT at home but noticed things were causing her more pain to the point of physically crying so she doesn't do some of her exercises. She struggles to do laundry,  loading/unloading, vacuuming, gardening.  She doesn't notice that she has had significantly " "increased appetite but does hyperfocus on certain foods and acknowledges that she's eaten more ice cream than usual.    \"I've tried every depression medication. I won't go on it again. It's done nothing for me.\"    PSYCHIATRIC REVIEW OF SYSTEMS:   Per [facility] Kandy Meier during today's team-based visit:   PHQ9 score is 18 indicating moderately severe depression.  Suicidal ideation:  Denies  GAD7 score is  is  15 indicating severe anxiety.  Depression:     Change in sleep, Lack of interest, Change in energy level, Difficulties concentrating, Psychomotor slowing or agitation, Feelings of hopelessness, Feelings of helplessness, Low self-worth, Ruminations, Irritability, Feeling sad, down, or depressed, Withdrawn and Frequent crying  Laura:             No Symptoms Fam hx Bipolar: son told but not agree with dx  Psychosis:       No Symptoms  Anxiety:           Excessive worry, Nervousness, Physical complaints, such as headaches, stomachaches, muscle tension, Social anxiety, Ruminations, Poor concentration and Irritability prefers to stay home  Panic:              Palpitations, Shortness of breath and Hot or cold flashes thought might be heart attacks in the past- went to ED. Happens depending on day. Triggered by appointments, social situations, medical complications, etc. Uses Xanax to manage.  Post Traumatic Stress Disorder:  Experienced traumatic event watching family members die (all expected), son and his mental health issues, Reexperiencing of trauma, Avoids traumatic stimuli, Impaired functioning and Nightmares not dx but thinks she has  Eating Disorder:          No Symptoms  ADD / ADHD:              No symptoms  Conduct Disorder:       No symptoms  Autism Spectrum Disorder:     No symptoms  Obsessive Compulsive Disorder:       No Symptoms    SUBSTANCE USE HISTORY    Tobacco use: reducing  Caffeine:  Yes  3 cups/day of coffee  Current alcohol:  Every few weeks  Current substance use: not reviewed  rx suboxone for " "OUD in remission, rx benzos daily  Past use alcohol/substance use: denies    PSYCHIATRIC HISTORY:   Past psychiatric diagnoses:   Anxiety  Depression  Opioid dependence in remission    Past psychiatric history and treatment:  Previous psychiatry: No real outpt engagement  Previous therapist: none  History of Psychiatric Hospitalizations:   - Inpatient: [facility] following attempt in 2008  - IOP/PHP/Day treatment: none  History of Suicidal Ideation: denies  History of Suicide Attempts:  2008 by overdose    History of Self-injurious Behavior: none  History of impulsivity: No   History of Violence/Aggression: No   History of Commitment? No   Electroconvulsive Therapy (ECT) or Transcranial Magnetic Stimulation (TMS): No   PharmacogenomicTesting (such as GeneSight): No     SOCIAL HISTORY                                         Per [facility] Kandy Meier during today's team-based visit:  \"Patient reported they grew up in [place], [place], patient was raised by her biological mother and stepfather. Patient reported that their childhood was (did not respond. ) Patient described their current relationships with family of origin as inconsistent.  Patient reports having 1 sibling and 7 stepsiblings.  The patient describes their cultural background as .  Cultural influences and impact on patient's life structure, values, norms, and healthcare: .  Contextual influences on patient's health include: Economic Factors Able to make ends meet.. These factors will be addressed in the Preliminary Treatment plan.  Patient identified their preferred language to be English. Patient reported they do not  need the assistance of an  or other support involved in therapy.   Patient reported had no significant delays in developmental tasks.   Patient's highest education level was some college. Patient identified the following learning problems: none reported.  Modifications will not be used to assist communication in therapy.  Patient " "reports they are  able to understand written materials.  Patient reported the following relationship history has a significant other whom she lives with.  Patient has a 25-year-old son who resides out in [place].  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 Patient identified their sexual orientation as heterosexual.  Patient reported having Patient identified significant other as part of their support system.  Patient identified the quality of these relationships as good.   Patient's current living/housing situation involves staying with Significant other and they report that housing is stable.   Patient is currently unemployed.  Patient reports their finances are obtained through [employer]  Patient does identify finances as a current stressor.    Patient reported that they have not been involved with the legal system. Patient denies being on probation / parole / under the jurisdiction of the court.\"    MEDICATIONS                                                                                              Current medications reviewed today and are noted below.   Current psychotropic medications:   Zolpidem 5mg qHS  Alprazolam 1mg BID  Duloxetine 30mg at bedtime   Quetiapine 200mg at bedtime     Pertinent Medications  Ropinerole 1mg at bedtime for RLS   Suboxone 2-0.5mg - half film under tongue once daily for OUD in remission   Cyclobenzaprine 5-10mg TID PRN for muscle pain    Past psychotropic medications:  \"I've tried every depression medication. I won't go on it again. It's done nothing for me.\"  Amitriptyline  Bupropion  Buspirone  Citalopram  Clonidine  Fluoxetine  Gabapentin  Lorazepam  Sertraline  Trazodone  Chantix  Hydroxyzine    Supplements:   See below      4/4/23 Zolpidem 5mg #30  3/20/23 Suboxone 2-0.5mg #30  3/14/23 Alprazolam 1mg #60  3/7/23Suboxone 2-0.5mg #14  3/2/23 Zolpidem 5mg #30    Current Outpatient Medications   Medication Sig     acetaminophen (TYLENOL) 500 " MG tablet Take 1,000 mg by mouth every 6 hours as needed for mild pain     ALPRAZolam (XANAX) 1 MG tablet TAKE ONE TABLET BY MOUTH TWICE A DAY     bisacodyl (DULCOLAX) 5 MG EC tablet Take 1 tablet (5 mg) by mouth every other day (Patient taking differently: Take 5 mg by mouth every other day At night)     buprenorphine HCl-naloxone HCl (SUBOXONE) 2-0.5 MG per film Place 0.5 Film under the tongue daily      cetirizine (ZYRTEC) 10 MG tablet TAKE ONE TABLET BY MOUTH EVERY MORNING (Patient taking differently: Take 10 mg by mouth daily)     cyclobenzaprine (FLEXERIL) 5 MG tablet TAKE ONE TO TWO TABLETS BY MOUTH THREE TIMES A DAY AS NEEDED FOR MUSCLE SPASMS     DULoxetine (CYMBALTA) 30 MG capsule Take 1 capsule (30 mg) by mouth 2 times daily (Patient taking differently: Take 30 mg by mouth At Bedtime)     famotidine (PEPCID) 40 MG tablet Take 1 tablet (40 mg) by mouth daily (Patient taking differently: Take 40 mg by mouth daily at 4pm)     lactulose (CHRONULAC) 10 GM/15ML solution Take 10 g by mouth every 6 hours as needed for constipation     Melatonin 10 MG TABS tablet Take 10 mg by mouth nightly as needed for sleep     ondansetron (ZOFRAN ODT) 4 MG ODT tab DISSOLVE ONE TABLET BY MOUTH EVERY 6 HOURS AS NEEDED FOR NAUSEA     pantoprazole (PROTONIX) 40 MG EC tablet Take 1 tablet (40 mg) by mouth daily Take 30-60 min before breakfast. Stop omeprazole     polyethylene glycol (MIRALAX) 17 GM/Dose powder Take 17 g (1 capful) by mouth daily     QUEtiapine (SEROQUEL) 200 MG tablet TAKE 1 TABLET (200 MG) BY MOUTH AT BEDTIME     rOPINIRole (REQUIP) 1 MG tablet TAKE ONE TABLET BY MOUTH EVERY NIGHT AT BEDTIME     simvastatin (ZOCOR) 10 MG tablet TAKE ONE TABLET BY MOUTH EVERY NIGHT AT BEDTIME (Patient taking differently: Take 10 mg by mouth At Bedtime)     Turmeric 500 MG CAPS Take 1 capsule by mouth daily     zolpidem (AMBIEN) 5 MG tablet TAKE ONE TABLET (5 MG) BY MOUTH NIGHTLY AS NEEDED FOR SLEEP     sucralfate (CARAFATE) 1 GM  tablet Take 1 tablet (1 g) by mouth 3 times daily     No current facility-administered medications for this visit.        VITALS   LMP  (LMP Unknown)     Pulse Readings from Last 5 Encounters:   02/27/23 85   01/06/23 85   11/07/22 100   07/12/22 117   07/01/22 79     Wt Readings from Last 5 Encounters:   02/27/23 83.9 kg (185 lb)   01/06/23 78 kg (172 lb)   11/07/22 78 kg (172 lb)   07/01/22 78.5 kg (173 lb)   06/29/22 78.5 kg (173 lb)     BP Readings from Last 5 Encounters:   02/27/23 (!) 137/96   01/06/23 117/81   11/07/22 139/85   07/12/22 138/81   07/01/22 107/71       LABS & IMAGING                                                                                                                Recent available labs reviewed today.    Recent Labs   Lab Test 02/27/23  1701 06/29/22  0958 07/06/21  1153   CR 0.67 0.68 0.75   GFRESTIMATED >90 >90 >90     Recent Labs   Lab Test 07/06/21  1153 03/02/21  1403   AST 33 22   ALT 34 38   ALKPHOS 95 111     Recent Labs   Lab Test 06/29/22  0958 03/02/21  1403 04/03/19  1334 11/30/18  1439   TSH 3.51 2.42 1.91 1.88     ECG 2/27/23 QTc = 431ms    ALLERGY & IMMUNIZATIONS       Allergies   Allergen Reactions     Cafergot      12-            GI problems-     Seasonal Allergies      Sumatriptan      vomits after giving herself a shot     Compazine Anxiety     Droperidol Anxiety     Nubain [Nalbuphine Hcl] Anxiety     Prochlorperazine Palpitations     Uncontrolled movement       MEDICAL & SURGICAL HISTORY    Reviewed past medical and surgical history today.   Pregnant - denies.   Hx seizures or head injuries - No    Past Medical History:   Diagnosis Date     Abdominal pain, right lower quadrant 03/09/2008    Admit. Discharged 03/10/08     Anxiety attack 07/31/2015     Atypical chest pain 06/23/2015     De Quervain's disease (tenosynovitis)      Dehydration      Gastric ulcer 07/31/2015     GERD (gastroesophageal reflux disease) 07/28/2010    Takes omeprazole and  metoclopramide      Ingrowing nail 01/09/2014     Migraines      Opioid dependence in remission (H) 08/02/2015     Other and unspecified ovarian cyst      Papanicolaou smear of cervix with low grade squamous intraepithelial lesion (LGSIL) 07/07/2017       FAMILY MEDICAL AND PSYCHIATRIC HISTORY     Family History   Problem Relation Age of Onset     Depression Mother      Respiratory Mother      Chronic Obstructive Pulmonary Disease Mother      Cerebrovascular Disease Father         Brain anyeurism     Breast Cancer Cousin      Adrenal Disorder Other      Chronic Obstructive Pulmonary Disease Other      Family history of sudden or unexplained death or an event requiring resuscitation in children or young adults, cardiac arrhythmias (eg, Silver-Parkinson-White syndrome), long QT syndrome, catecholaminergic paroxysmal ventricular tachycardia, Brugada syndrome, arrhythmogenic right ventricular dysplasia, hypertrophic cardiomyopathy, dilated cardiomyopathy, or Marfan syndrome?  No    Family psychiatric history: mom with depression, son with various MH concers  Family substance use history:  Brother with ETOH problems  Family suicide history: No  Medications family responded to: Yes: mom tried sertraline, son at times was on SGA    MEDICAL REVIEW OF SYSTEMS:   10 systems (general, cardiovascular, respiratory, eyes, ENT, endocrine, GI, , M/S, neurological) were reviewed. Most pertinent finding(s) is/are: chronic back and wrist px, GERD. The remaining systems are all unremarkable.    MENTAL STATUS EXAM:     Alertness: alert  and oriented  Appearance: adequately groomed  Behavior/Demeanor: cooperative, pleasant and calm, with good eye contact   Speech: slightly slurred and slowed  Language: WNL  Psychomotor: normal or unremarkable  Mood: depressed and anxious  Affect: restricted and appropriate; was congruent to mood; was congruent to content  Thought Process/Associations: circumstantial and overinclusive   Thought Content:   Reports none;  Denies suicidal ideation, violent ideation and delusions  Perception:  Reports none;  Denies auditory hallucinations and visual hallucinations  Insight: intact  Judgment: intact  Cognition: does  appear grossly intact; formal cognitive testing was not done  Gait and Station: HENRY    RISK AND PROTECTIVE FACTORS     Static Risk Factors: history of MH diagnoses and/or treatment and history of hospitalization  Firearms/Weapons Access: No: Patient denies  Dynamic Risk Factors: substance use, anxiety, feelings of guilt, lack of social support, chronic pain, financial problems and mental health stigma    Protective Factors: hope for the future, compliance with medication, future oriented, restricted access to means, resilience, access to care as needed, reasons for living and displaying help seeking behavior    SAFETY ASSESSMENT     Based on review of above risk and protective factors and today's exam, pt is at low acute risk of harm to self or others and chronic elevated risk due to history and risk factors. She does not meet criteria for a 72 hr hold and remains appropriate for ongoing outpatient care. The patient convincingly denies suicidality today. There was no deceit detected, and the patient presented in a manner that was believable. Local community safety resources printed and reviewed for patient to use if needed.    Recommended that patient call 911 or go to the local ED should there be a change in any of these risk factors.    DSM 5 DIAGNOSIS     296.32 (F33.1) Major Depressive Disorder, Recurrent Episode, Moderate _  300.02 (F41.1) Generalized Anxiety Disorder  Substance-Related & Addictive Disorders Sedative, Hypnotic or Anxiolytic Related Disorder, dependence   304.10 (F13.20) Moderate  Opioid dependence in remission    DIFFERENTIAL DIAGNOSIS: NA    Medical comorbidities impacting or contributing to clinical picture: Chronic pain, back, wrists  Known issue that I take into account for their  medical decisions, no current exacerbations or new concerns.    ASSESSMENT AND PLAN      ASSESSMENT:  Radha Beckwith is a 47 year old White, female who presents for initial visit with [facility] Care [facility] Service ([facility]) for medication management. Pt with a history of chronic pain, on MAT, anxiety with BZD dependence, and depression who presents for consultation for anxiety/depression and current medications. She says antidepressant trials have always gone poorly and she will never take another antidepressant again. She doesn't like the risks of TD or death associated with SGA so she doesn't want to take one. She perceives current medicines are somewhat helpful for mood/anxiety, though sx generally persist. We discussed quetiapine as an SGA and she would like to discontinue this right away. She understands quetiapine is more likely to have caused her 40# gain than duloxetine. She perceives duloxetine is helpful for her nerve pain but never really tried 60mg or more for an extended period of time due to concerns of weight gain. I counseled pt on general bzd risks and bzd risks in addition to her other CNS depressant medications. I advised a taper and D/C would be the safest option, though it would take time. She is interested in tapering off quetiapine and revisiting trazodone for sleep, which I think is reasonable and has a better SE profile. She will RTC in 4 weeks or sooner PRN. She denies safety concerns today.     TREATMENT PLAN: Medication side effects and alternatives reviewed. Health promotion activities recommended and reviewed. All questions addressed. Education and counseling completed regarding risks and benefits of medications and psychotherapy options. Collaborative Care Psychiatry Service model reviewed today. Recommend therapy for additional support. Safety plan reviewed as indicated.     MEDICATIONS:   -decrease QUETIAPINE to 150mg x 2 weeks then 100mg x 2 weeks then 50mg x 2 weeks then  25mg x 2 weeks then stop  -continue DULOXETINE 30mg nightly - rx by PCP  -continue ALPRAZOLAM 1mg BID - rx by PCP  -continue AMBIEN 5mg at bedtime - rx by PCP    LABS/RADS:   -reviewed 2/27/23 labs    PATIENT STATUS:  [facility] MD/DO/NP/PA providers offer care a specialty service for Primary Care Providers in the [facility] that seek to optimize psychotropic medications for unstable patients.  Once medications have been optimized, our providers discharge the patient back to the referring Primary Care Provider for ongoing medication management.  This type of system allows our providers to serve a high volume of patients.   -Pt will be seen for continued medication stabilization in [facility].    PSYCHOSOCIAL:   -therapy ongoing  -consider panel psychiatry  -Follow up with primary care provider as planned or for acute medical concerns.    PSYCHOEDUCATION:  Medication side effects and alternatives reviewed. Health promotion activities recommended and reviewed today. All questions addressed. Education and counseling completed regarding risks and benefits of medications and psychotherapy options.  Consent provided by patient/guardian  Call the psychiatric nurse line with medication questions or concerns at [phone].  Slingrt may be used to communicate with your provider, but this is not intended to be used for emergencies.  BLACK BOX WARNING: Discussed the Food and Drug Administration (FDA) requires that all antidepressants carry a warning that some children, adolescents and young adults may be at increased risk of suicide when taking antidepressants. Anyone taking an antidepressant should be watched closely for worsening depression or unusual behavior especially in the first few weeks after starting an SSRI. Keep in mind, antidepressants are more likely to reduce suicide risk in the long run by improving mood.   BENZODIAZEPINE:  discussion on how benzos work and the need to use them short term due to potential of anxiety  getting.  This is a controlled substance with risk for abuse, need to keep in a safe keep place and cannot replace lost scripts.    HYPNOTIC USE: Hypnotic use, risk for CNS depression, sleep-walking, not to mix with ETOH or other CNS depressant, need for six hours of sleep, stop if change in mood.  This is a controlled substance with risk for abuse, need to keep in a safe keep place and cannot replace lost scripts.  FIRST GENERATION ANTIPSYCHOTIC/ SECOND GENERATION ANTIPSYCHOTIC USE:  Atypical need for cardiometabolic monitoring with medication- B/P, weight, blood sugar, cholesterol.  Need to monitor for abnormal movements taught  Medlineplus.gov is information for patients.  It is run by the [employer] and it contains information about all disorders, diseases and all medications.      FOLLOW-UP: Follow up in 4 weeks    1. Continue all other treatments (including medications) per primary care provider and/or specialists.   2. To schedule individual or family therapy, call [facility] at [phone].   3. Follow up with primary care provider as planned or for acute medical concerns.  4. Call the psychiatric nurse line with medication questions or concerns at [phone] or [phone].  5. [employer] may be used to communicate with your care team, but this is not intended to be used for emergencies.    CRISIS RESOURCES:    1. Present to the Emergency Department as needed or call after hours crisis line at [phone] or [phone].   2. [place] Crisis Text Line: Text [place] to 071629.  3. Suicide LifeLine Chat: suicidepreventionlifeline.org/chat/.  4. National Suicide Prevention Lifeline: 559.600.8233 (TTY: 853.336.1029). Call anytime for help.  (www.suicidepreventionlifeline.org)  5. National [place] on Mental Illness (www.anushka.org): 406.410.3296 or 191-105-7946.  6. Mental Health Association (www.mentalhealth.org): 425.702.1771 or 925-931-2957.    ADMINISTRATIVE BILLING:    Time  spent interviewing patient, reviewing referral documents, obtaining and reviewing outside records, communication with other health specialists, and preparing this report on today's date.    Signed:   Ronni Wienberg DNP, PMHNP-BC  [facility] ([facility])

## 2023-04-18 NOTE — Clinical Note
Zev Pitts,  I had a nice consult with Linette today. We are going to work on tapering quetiapine due to her concerns about being on an SGA. She is resistant to adjustments to BZD or Ambien today. She is not open to antidepressants but we discussed increasing duloxetine in the future to maximize the effect on pain plus mood.  I would ask that you continue her Ambien, alprazolam, and duloxetine prescriptions for now. If/when we make adjustments there, I will let you know and assume responsibility of those rx.   Let me know if any questions! JEANNIE Lehman [facility]

## 2023-04-19 RX ORDER — QUETIAPINE FUMARATE 100 MG/1
TABLET, FILM COATED ORAL
Qty: 42 TABLET | Refills: 0 | Status: SHIPPED | OUTPATIENT
Start: 2023-04-19 | End: 2023-07-10

## 2023-04-19 RX ORDER — ALPRAZOLAM 1 MG
TABLET ORAL
Qty: 60 TABLET | Refills: 0 | Status: SHIPPED | OUTPATIENT
Start: 2023-04-19 | End: 2023-05-19

## 2023-04-24 ASSESSMENT — PAIN SCALES - PAIN ENJOYMENT GENERAL ACTIVITY SCALE (PEG)
INTERFERED_GENERAL_ACTIVITY: 10
INTERFERED_ENJOYMENT_LIFE: 10 - COMPLETELY INTERFERES
INTERFERED_ENJOYMENT_LIFE: 10
PEG_TOTALSCORE: 9.33
INTERFERED_GENERAL_ACTIVITY: 10 - COMPLETELY INTERFERES
AVG_PAIN_PASTWEEK: 8

## 2023-04-25 ENCOUNTER — DOCUMENTATION ONLY (OUTPATIENT)
Dept: BEHAVIORAL HEALTH | Facility: CLINIC | Age: 48
End: 2023-04-25
Payer: COMMERCIAL

## 2023-04-25 NOTE — PROGRESS NOTES
Patient is scheduled for a virtual follow-up visit on 4/25/2023 at 11:00 AM.  Patient did not respond virtually or by phone.  Message left for patient to reschedule at her convenience.    Swetha TOMPKINS  4/25/23

## 2023-04-26 ENCOUNTER — OFFICE VISIT (OUTPATIENT)
Dept: PALLIATIVE MEDICINE | Facility: CLINIC | Age: 48
End: 2023-04-26
Attending: NURSE PRACTITIONER
Payer: COMMERCIAL

## 2023-04-26 VITALS — SYSTOLIC BLOOD PRESSURE: 153 MMHG | DIASTOLIC BLOOD PRESSURE: 91 MMHG | HEART RATE: 102 BPM

## 2023-04-26 DIAGNOSIS — M54.2 CHRONIC NECK PAIN: ICD-10-CM

## 2023-04-26 DIAGNOSIS — G89.29 CHRONIC NECK PAIN: ICD-10-CM

## 2023-04-26 DIAGNOSIS — G89.29 CHRONIC BILATERAL THORACIC BACK PAIN: ICD-10-CM

## 2023-04-26 DIAGNOSIS — M54.42 CHRONIC BILATERAL LOW BACK PAIN WITH BILATERAL SCIATICA: ICD-10-CM

## 2023-04-26 DIAGNOSIS — M54.6 CHRONIC BILATERAL THORACIC BACK PAIN: ICD-10-CM

## 2023-04-26 DIAGNOSIS — M53.3 PAIN OF BOTH SACROILIAC JOINTS: Primary | ICD-10-CM

## 2023-04-26 DIAGNOSIS — M54.41 CHRONIC BILATERAL LOW BACK PAIN WITH BILATERAL SCIATICA: ICD-10-CM

## 2023-04-26 DIAGNOSIS — M79.18 MYOFASCIAL PAIN: ICD-10-CM

## 2023-04-26 DIAGNOSIS — G89.29 CHRONIC BILATERAL LOW BACK PAIN WITH BILATERAL SCIATICA: ICD-10-CM

## 2023-04-26 PROCEDURE — 99205 OFFICE O/P NEW HI 60 MIN: CPT

## 2023-04-26 RX ORDER — BACLOFEN 10 MG/1
TABLET ORAL
Qty: 60 TABLET | Refills: 1 | Status: SHIPPED | OUTPATIENT
Start: 2023-04-26 | End: 2023-07-07

## 2023-04-26 ASSESSMENT — ANXIETY QUESTIONNAIRES
3. WORRYING TOO MUCH ABOUT DIFFERENT THINGS: NEARLY EVERY DAY
GAD7 TOTAL SCORE: 18
4. TROUBLE RELAXING: MORE THAN HALF THE DAYS
6. BECOMING EASILY ANNOYED OR IRRITABLE: NEARLY EVERY DAY
GAD7 TOTAL SCORE: 18
1. FEELING NERVOUS, ANXIOUS, OR ON EDGE: NEARLY EVERY DAY
5. BEING SO RESTLESS THAT IT IS HARD TO SIT STILL: MORE THAN HALF THE DAYS
2. NOT BEING ABLE TO STOP OR CONTROL WORRYING: NEARLY EVERY DAY
7. FEELING AFRAID AS IF SOMETHING AWFUL MIGHT HAPPEN: MORE THAN HALF THE DAYS
IF YOU CHECKED OFF ANY PROBLEMS ON THIS QUESTIONNAIRE, HOW DIFFICULT HAVE THESE PROBLEMS MADE IT FOR YOU TO DO YOUR WORK, TAKE CARE OF THINGS AT HOME, OR GET ALONG WITH OTHER PEOPLE: EXTREMELY DIFFICULT

## 2023-04-26 ASSESSMENT — PAIN SCALES - GENERAL: PAINLEVEL: SEVERE PAIN (6)

## 2023-04-26 NOTE — PATIENT INSTRUCTIONS
Pain Physical Therapy:  YES   I am referring for targeting stretching, strengthening, and home exercise plan to support functional goals/ADLs.     Pain Psychologist to address relaxation, behavioral change, coping style, and other factors important to improvement.  NO    Diagnostic Studies:  Reviewed lumbar MRI from 2022 - multilevel degenerative changes.     Medication Management:   Start baclofen 5 mg daily x 3 days, then increase to 5 mg twice daily. If tolerating, may increase to 10 mg twice daily. Monitor for sedation and dizziness/drowsiness, be careful driving until you know effects of medication. Side effects should improve within 1-2 weeks.   Change flexeril - continue 5-10 mg at bedtime as needed.   Allow 4-6 hours in between all muscle relaxant doses, avoid concurrent alcohol use.     Potential procedures:   I am recommending bilateral SI joint injections with my colleague, Dr. Reynaga. She will be contacted to schedule. Positive bilateral KATHY, sacral thrust, and Yeoman's on exam suggestive of SI joint mediated pain.     Other Orders/Referrals:   Recommend using TENS unit a few times throughout the day as needed, particularly when engaging in activities that may increase pain.     Follow up with JEANNIE Schroeder CNP in 2-4 weeks after SI joint injection, or sooner if needed.     ----------------------------------------------------------------  Clinic Number:  [phone]   Call with any questions about your care and for scheduling assistance.   Calls are returned Monday through Friday between 8 AM and 4:30 PM. We usually get back to you within 2 business days depending on the issue/request.    If we are prescribing your medications:  For opioid medication refills, call the clinic or send a [employer] message 7 days in advance.  Please include:  Name of requested medication  Name of the pharmacy.  For non-opioid medications, call your pharmacy directly to request a refill. Please allow 3-4 days to be  processed.   Per [place] State Law:  All controlled substance prescriptions must be filled within 30 days of being written.    For those controlled substances allowing refills, pickup must occur within 30 days of last fill.      We believe regular attendance is key to your success in our program!    Any time you are unable to keep your appointment we ask that you call us at least 24 hours in advance to cancel.This will allow us to offer the appointment time to another patient.   Multiple missed appointments may lead to dismissal from the clinic.

## 2023-04-26 NOTE — PROGRESS NOTES
[facility]     Date of visit: 4/26/2023    Assessment:  Radha Beckwith is a 47 year old female with a past medical history significant for chronic bilateral low back pain, depression, SVT, opioid dependence in remission, anxiety, tobacco use, GERD, restless legs who presents with complaints of neck pain, mid and low back.     1. Low back pain - Onset of pain within last 2 years. Pain is mostly axial, though she does have intermittent radicular leg symptoms, occasionally extends below the knee. On exam, positive facet loading and positive KATHY, sacral thrust and Yeoman's bilaterally. Positive myofascial TTP, negative SLR and neuro exam WNL. Etiology is likely associated with multiple factors, including underlying degenerative changes of lumbar spine, facet and SIJ mediated components, with prominent overlying myofascial component.   2. Neck/thoracic back pain - Onset of pain within last 2 years. On exam, positive for myofascial TTP, most notably in lower thoracic paraspinals. Etiology is likely associated with multiple factors, including underlying degenerative changes, with prominent overlying myofascial component.       Visit diagnoses:   1. Pain of both sacroiliac joints    2. Chronic bilateral thoracic back pain    3. Chronic neck pain    4. Myofascial pain    5. Chronic bilateral low back pain with bilateral sciatica        Plan:  The following recommendations were given to the patient. Diagnosis, treatment options, risks, benefits, and alternatives were discussed, and all questions were answered. The patient expressed understanding of the plan for management.     I am recommending a multidisciplinary treatment plan to help this patient better manage her pain.  This includes:     Pain Physical Therapy:  YES   I am referring for targeting stretching, strengthening, and home exercise plan to support functional goals/ADLs.     Pain Psychologist to address relaxation, behavioral  change, coping style, and other factors important to improvement.  NO    Diagnostic Studies:  Reviewed lumbar MRI from 2022 - multilevel degenerative changes.     Medication Management:     Start baclofen 5 mg daily x 3 days, then increase to 5 mg twice daily. If tolerating, may increase to 10 mg twice daily. Monitor for sedation and dizziness/drowsiness, be careful driving until you know effects of medication. Side effects should improve within 1-2 weeks.     Change flexeril - continue 5-10 mg at bedtime as needed.     Allow 4-6 hours in between all muscle relaxant doses, avoid concurrent alcohol use.     Potential procedures:     I am recommending bilateral SI joint injections with my colleague, Dr. Reynaga. She will be contacted to schedule. Positive bilateral KATHY, sacral thrust, and Yeoman's on exam suggestive of SI joint mediated pain.     Other Orders/Referrals:     Recommend using TENS unit a few times throughout the day as needed, particularly when engaging in activities that may increase pain.     Follow up with JEANNIE Schroeder CNP in 2-4 weeks after SI joint injection, or sooner if needed.     Review of Electronic Chart: Today I have also reviewed available medical information in the patient's medical record at [facility] ([facility]) and Care Everywhere (if available), including relevant provider notes, laboratory work, and imaging.     Jenny Landrum DNP, APRN, AGNP-C  [facility]         -------------------------------------------------------------------    Subjective     Reason for consultation:    Radha Beckwith is a 47 year old female who is seen in consultation today at the request of PCP for evaluation of her pain issues and recommendations for management, with specific emphasis on  Chronic neck pain [M54.2, G89.29]     Reason for Referral: Comprehensive Pain Evaluation Are there any red flags that may impact the assessment or management of the patient? Mental  Illness/Communication Difficulties Patient has Already been Evaluated/Treated at a Pain Clinic Please Specify: referral pain recheck Provider, please review opioid agreement in the process instructions above. Do you agree to these terms?       Please see the [facility] health questionnaire which the patient completed and reviewed with me in detail (if available).     Review of [place] Prescription Monitoring Program (): No concern for abuse or misuse of controlled medications based on this report. Reviewed - Suboxone, benzo    Review of Electronic Chart: Today I have also reviewed available medical information in the patient's medical record at [facility] ([facility] including relevant provider notes, laboratory work, and imaging.     Pain medications are being prescribed by PCP, outside provider.     Chief Complaint:    Chief Complaint   Patient presents with     Pain     Middle of back.          HPI:     Radha Beckwith is a 47 year old female presents with a chief complaint of neck pain/upper back and radicular low back pain.     The pain has been present for 1.5-2 years current pain episode.    The pain is Severe Pain (6) in severity.    The pain is described as dull ache in mid back, cramping, sharp, pressure, throbbing, burning in low back, radicular pain into hips, legs, butt, stops above knee.   The pain is alleviated by position changes, massage/heat device, exercise.    It is exacerbated by prolonged sitting, pain is constant but fluctuates with activity, prolonged standing and walking.    Modalities that have been utilized in the past which were helpful include PT.    Things that were not helpful, but tried ,include back brace, LESI x 1, TPI (?), TENS unit (OTC from [facility]).    The patient has never tried pain PT, SI joint/facet.  The patient otherwise denies bowel or bladder incontinence, parasthesias, weakness, saddle anesthesia, unintentional weight loss, or  fever/chills/sweats.   Radha Beckwith has been seen at a pain clinic in the past.  She sees addiction med in [place], [facility] with Dr. Ivory.   -She is not currently working due to pain, off work for 1.5 years.   -She looked into disability but was advised by  that they would not take on her case.   -She had L5-S1 JASE 7/2022 with Dr. Ivory in [place].   -She saw neurosurgery in the past as well in 2021.   -She saw pain provider through [facility], did not care for provider.   -She has 1 kid, 25 year old boy, [employer] and .     Pain Questionnaire    What number best describes your pain right now: 7  (0 = No pain to 10 = Worst pain imaginable)    How would you describe the pain? burning, cramping, sharp, numbness, dull, aching, throbbing, pressure    Which of the following worsen your pain? lying down, standing, sitting, walking, exercise, bowel movements    Which of the following improve or reduce your pain? sitting, medication, relaxation    What number best describes your average pain for the past week: 8  (0 = No pain to 10 = Worst pain imaginable)    What number best describes your LOWEST pain in past 24 hours: 5  (0 = No pain to 10 = Worst pain imaginable)    What number best describes your WORST pain in past 24 hours: 10  (0 = No pain to 10 = Worst pain imaginable)    When is your pain worst? AM, Night, Constant    What non-medicine treatments have you already had for your pain? pain clinic, physical therapy, chiropractic care, TENS (electrical stimulator), spine injections (shots), counseling    Have you tried treating your pain with medication? Yes    If yes, please answer the below questions -     What topical medications have you tried in the past but are no longer taking? Diclofenac (Voltaren) gel, Gabapentin (Neurontin) gel, Lidoderm patch, Other gels, creams, patches or ointments    What anti-convulsants (seizure medicines) have you tried in the past but are no longer  taking? Carbamazepine (Tegretol), Depakote (Valproic acid), Gabapentin (Neurontin), Oxcarbazepine (Trileptal), Pregabalin (Lyrica)    What anti-depressant medication have you tried in the past but are no longer taking? Amitriptyline (Elavil), Bupropion (Wellbutrin), Citalopram (Celexa, Lexapro), Fluoxetine (Prozac), Nortriptyline (Pamelor), Sertraline (Zoloft), Trazodone (Desyrel), Venlafaxine (Effexor)    What sleep aid medications have you tried in the past but are no longer taking? Hydroxyzine (Atarax, Vistaril), Lorazepam (Ativan)    What opioid medications have you tried in the past but are no longer taking? Codeine (Tylenol #3), Hydrocodone (Lorcet, Lortab, Vicodin), Oxycodone (Percocet, OxyContin), Tramadol (Ultram)    What NSAID medications have you tried in the past but are no longer taking? Celecoxib (Celebrex), Diclofenac (Voltaren), Ibuprofen (Advil, Motrin), Indomethacin (Indocin), Ketorolac (Toradol), Nabumetone (Relafen), Naproxen (Aleve)    What muscle relaxer medications have you tried in the past but are no longer taking? Methocarbamol (Robaxin), Tizanidine (Zanaflex)    What anti-migraine medications have you tried in the past but are no longer taking? Acetaminophen-butalbital-caffeine (Fioicet), Eletriptan (Frova), Ergotamine (Cafergot), Isometheptene-dichloralphenazone-acetominophen (Midrin), Naratriptan (Amerge), Propranolol (Inderal), Rizatriptan (Maxalt), Sumatriptan (Imitrex) shots, Sumatriptan (Imitrex) tablets, Zolmitriptan (Zomig)      Are you currently taking medications for your pain? Yes    If yes, please answer below -     During the past month, list all the medications that you have used for pain. Please list drug name, dose, and frequency taking: Tylenol 1000 mg x 2 or 3, Flexeri 10mg x 3, Duloxetine 30mg x 2      Current Pain Treatments:    1. Medications:    Suboxone 2-0.5 mg daily (weaning off)   Duloxetine 30 mg BID   Ropinirole 1 mg at bedtime    Cyclobenzaprine 5-10 mg at  bedtime PRN    Baclofen 5-10 mg BID    2. Other therapies:    TENS   Pain PT      Current Outpatient Medications   Medication     acetaminophen (TYLENOL) 500 MG tablet     ALPRAZolam (XANAX) 1 MG tablet     baclofen (LIORESAL) 10 MG tablet     bisacodyl (DULCOLAX) 5 MG EC tablet     buprenorphine HCl-naloxone HCl (SUBOXONE) 2-0.5 MG per film     cetirizine (ZYRTEC) 10 MG tablet     cyclobenzaprine (FLEXERIL) 5 MG tablet     DULoxetine (CYMBALTA) 30 MG capsule     famotidine (PEPCID) 40 MG tablet     lactulose (CHRONULAC) 10 GM/15ML solution     Melatonin 10 MG TABS tablet     ondansetron (ZOFRAN ODT) 4 MG ODT tab     pantoprazole (PROTONIX) 40 MG EC tablet     polyethylene glycol (MIRALAX) 17 GM/Dose powder     QUEtiapine (SEROQUEL) 100 MG tablet     rOPINIRole (REQUIP) 1 MG tablet     simvastatin (ZOCOR) 10 MG tablet     Turmeric 500 MG CAPS     zolpidem (AMBIEN) 5 MG tablet     No current facility-administered medications for this visit.     Allergies   Allergen Reactions     Ergotamine-Caffeine      12-            GI problems-     Seasonal Allergies      Sumatriptan      vomits after giving herself a shot     Compazine Anxiety     Droperidol Anxiety     Nubain [Nalbuphine Hcl] Anxiety     Prochlorperazine Palpitations     Uncontrolled movement      Past Medical History:   Diagnosis Date     Abdominal pain, right lower quadrant 03/09/2008    Admit. Discharged 03/10/08     Anxiety attack 07/31/2015     Atypical chest pain 06/23/2015     De Quervain's disease (tenosynovitis)      Dehydration      Gastric ulcer 07/31/2015     GERD (gastroesophageal reflux disease) 07/28/2010    Takes omeprazole and metoclopramide      Ingrowing nail 01/09/2014     Migraines      Opioid dependence in remission (H) 08/02/2015     Other and unspecified ovarian cyst      Papanicolaou smear of cervix with low grade squamous intraepithelial lesion (LGSIL) 07/07/2017     Past Surgical History:   Procedure Laterality Date     BIOPSY  CERVICAL, LOCAL EXCISION, SINGLE/MULTIPLE N/A 8/10/2017    Procedure: BIOPSY CERVICAL, LOCAL EXCISION, SINGLE/MULTIPLE;;  Surgeon: Michael Chandler MD;  Location: [facility]     COLONOSCOPY N/A 1/6/2023    Procedure: COLONOSCOPY;  Surgeon: Michael Dozier MD;  Location: [facility]     COLPOSCOPY, BIOPSY, COMBINED N/A 8/10/2017    Procedure: COMBINED COLPOSCOPY, BIOPSY;  Colposcopy with Cervical Biopsies and Endometrial Biopsy, Exam with Ultrasound;  Surgeon: Michael Chandler MD;  Location: [facility]     ESOPHAGOSCOPY, GASTROSCOPY, DUODENOSCOPY (EGD), COMBINED N/A 4/17/2017    Procedure: COMBINED ESOPHAGOSCOPY, GASTROSCOPY, DUODENOSCOPY (EGD);  Surgeon: Ibrahima Esposito MD;  Location: [facility]     ESOPHAGOSCOPY, GASTROSCOPY, DUODENOSCOPY (EGD), COMBINED N/A 1/6/2023    Procedure: ESOPHAGOGASTRODUODENOSCOPY, WITH BIOPSY;  Surgeon: Michael Dozier MD;  Location: [facility]     EXAM UNDER ANESTHESIA PELVIC N/A 8/10/2017    Procedure: EXAM UNDER ANESTHESIA PELVIC;;  Surgeon: Michael Chandler MD;  Location: [facility]     HYSTERECTOMY       HYSTERECTOMY, PAP NO LONGER INDICATED       INJECT EPIDURAL LUMBAR Bilateral 7/1/2022    Procedure: Lumbar 5-Sacral 1 Transforaminal Epidural Steroid Injection with fluoroscopic guidance and contrast, bilateral;  Surgeon: Trevor Ivory MD;  Location: [facility]     LAPAROSCOPIC CHOLECYSTECTOMY N/A 2/2/2018    Procedure: LAPAROSCOPIC CHOLECYSTECTOMY;  Laparoscopic Cholecystectomy;  Surgeon: Tigre Lowry DO;  Location: [facility]     LAPAROSCOPIC HYSTERECTOMY TOTAL N/A 10/30/2017    Procedure: LAPAROSCOPIC HYSTERECTOMY TOTAL;  LAPAROSCOPIC HYSTERECTOMY TOTAL POSSIBLE SALPINGO-OOPHERECTOMY (BILATERAL);  Surgeon: Michael Chandler MD;  Location: [facility]     ZZHC UGI ENDOSCOPY, SIMPLE EXAM  01/07/08     Family History   Problem Relation Age of Onset     Depression Mother      Respiratory Mother      Chronic Obstructive Pulmonary Disease Mother      Cerebrovascular  Disease Father         Brain anyeurism     Breast Cancer Cousin      Adrenal Disorder Other      Chronic Obstructive Pulmonary Disease Other      Social History     Socioeconomic History     Marital status: Single   Tobacco Use     Smoking status: Every Day     Packs/day: 0.25     Years: 20.00     Pack years: 5.00     Types: Cigarettes     Smokeless tobacco: Never   Vaping Use     Vaping status: Some Days     Substances: Nicotine, CBD     Devices: Refillable tank     Passive vaping exposure: Yes   Substance and Sexual Activity     Alcohol use: Yes     Comment: occasional drinks; about 5-6 beers/week     Drug use: No     Sexual activity: Yes     Partners: Male     Birth control/protection: Female Surgical   Other Topics Concern     Parent/sibling w/ CABG, MI or angioplasty before 65F 55M? No      ROS: 10 point ROS neg other than the symptoms noted above in the HPI.      Objective      Diagnostic Testing - Imaging/Labs:    Labs:    Last BMP 2/27/23 - renal function WNL      Imaging:   MRI CERVICAL SPINE WITHOUT CONTRAST March 31, 2021 4:53 PM      HISTORY: Bilateral neck and arm pain.      TECHNIQUE: Multiplanar, multisequence MRI of the cervical spine  without contrast.      COMPARISON: None.      FINDINGS: Normal vertebral body height and sagittal alignment. Normal  bone marrow signal. No definite abnormal spinal cord signal.  Unremarkable paraspinous soft tissues.     Segmental analysis:  Craniovertebral Junction/C1-C2: Unremarkable.     C2-C3: Normal disc height. No herniation. Normal facets. No spinal  canal or neural foraminal stenosis.     C3-C4: Normal disc height. Shallow symmetric disc bulge with minimal  uncovertebral spurring. No spinal canal or neural foraminal stenosis.     C4-C5: Normal disc height. Shallow symmetric disc bulge with mild  right more than left uncovertebral spurring. Normal facets. No spinal  canal stenosis. Mild right neural foraminal stenosis. The left neural  foramen is  patent.     C5-C6: Normal disc height. Shallow symmetric disc bulge. Minimal  uncovertebral spurring. Normal facets. The spinal canal or neural  foraminal stenosis.     C6-C7: Normal disc height. No herniation. Normal facets. No spinal  canal or neural foraminal stenosis.     C7-T1: Normal disc height. No herniation. Normal facets. No spinal  canal or neural foraminal stenosis.     T1-T2: Tiny right central disc protrusion with minimal mass effect on  the ventral thecal sac but no mass effect on the spinal cord. Normal  facets. No spinal canal or neural foraminal stenosis.                                                                      IMPRESSION:  1. Mild multilevel degenerative changes of the cervical spine, as  described.  2. No spinal canal stenosis.  3. Mild right neural foraminal stenosis at C4-C5. Otherwise, no  significant neural foraminal stenosis.    MRI CERVICAL SPINE WITHOUT CONTRAST March 31, 2021 4:53 PM      HISTORY: Bilateral neck and arm pain.      TECHNIQUE: Multiplanar, multisequence MRI of the cervical spine  without contrast.      COMPARISON: None.      FINDINGS: Normal vertebral body height and sagittal alignment. Normal  bone marrow signal. No definite abnormal spinal cord signal.  Unremarkable paraspinous soft tissues.     Segmental analysis:  Craniovertebral Junction/C1-C2: Unremarkable.     C2-C3: Normal disc height. No herniation. Normal facets. No spinal  canal or neural foraminal stenosis.     C3-C4: Normal disc height. Shallow symmetric disc bulge with minimal  uncovertebral spurring. No spinal canal or neural foraminal stenosis.     C4-C5: Normal disc height. Shallow symmetric disc bulge with mild  right more than left uncovertebral spurring. Normal facets. No spinal  canal stenosis. Mild right neural foraminal stenosis. The left neural  foramen is patent.     C5-C6: Normal disc height. Shallow symmetric disc bulge. Minimal  uncovertebral spurring. Normal facets. The spinal canal  or neural  foraminal stenosis.     C6-C7: Normal disc height. No herniation. Normal facets. No spinal  canal or neural foraminal stenosis.     C7-T1: Normal disc height. No herniation. Normal facets. No spinal  canal or neural foraminal stenosis.     T1-T2: Tiny right central disc protrusion with minimal mass effect on  the ventral thecal sac but no mass effect on the spinal cord. Normal  facets. No spinal canal or neural foraminal stenosis.                                                                      IMPRESSION:  1. Mild multilevel degenerative changes of the cervical spine, as  described.  2. No spinal canal stenosis.  3. Mild right neural foraminal stenosis at C4-C5. Otherwise, no  significant neural foraminal stenosis.      Physical Exam  HENT:      Head: Normocephalic.   Pulmonary:      Effort: Pulmonary effort is normal.   Musculoskeletal:      Comments: See below.    Neurological:      Mental Status: She is alert.      Comments: See below.    Psychiatric:         Mood and Affect: Mood normal.         Musculoskeletal exam:  Able to walk on the heels and toes without difficulty. Patient gait intact.   Normal bulk and tone. Unremarkable spinal curvature.     Cervical spine:  Range of motion within normal limits   Tenderness in the cervical paraspinal muscles.Yes  Rotation/ext to right: No facet exam today   Rotation/ext to left: No facet exam today     Thoracic spine:    Kyphosis. No   Tenderness in the thoracic paraspinal muscles.Yes    Lumbar spine:    Tenderness in the lumbar paraspinal muscles.Yes   Rotation/ext to right: painful    Rotation/ext to left: painful     Myofascial tenderness:  Widespread throughout cervical, lower thoracic, and lumbosacral region.   Focal tenderness: Positive SI joint, gluteal, piriformis, and GT tenderness  Straight leg raise: Negative   KATHY/sacral thrust/Yeoman's: positive bilaterally     Hip exam:   Normal internal and external range of motion bilaterally. FADIR  positive.     Neurologic exam:  CN:  Cranial nerves 2-12 are grossly intact  Motor:  5/5 UE and LE strength            Reflexes:     Biceps:     R:  2/4 L: 2/4   Brachioradialis   R:  2/4 L: 2/4   Patella:  R:  2/4 L: 2/4   Achilles:  R:  2/4 L: 2/4    Sensory:   Light touch: normal bilateral upper and lower extremities    No allodynia, dysesthesia, or hyperalgesia.        BILLING TIME DOCUMENTATION:   The total TIME spent on this patient on the date of the encounter/appointment was 75 minutes.      TOTAL TIME includes:   Time spent preparing to see the patient (reviewing records and tests)   Time spent face to face (or over the phone) with the patient   Time spent ordering tests, medications, procedures and referrals   Time spent Referring and communicating with other healthcare professionals   Time spent documenting clinical information in Epic

## 2023-05-01 NOTE — PROGRESS NOTES
GASTROENTEROLOGY RETURN PATIENT VIDEO VISIT    CC/REFERRING MD:    Gisselle Linn  No ref. provider found    REASON FOR CONSULTATION:       HISTORY OF PRESENT ILLNESS:    Radha Beckwith is 47 year old female who presents for a 2 months follow up on acid reflux and constipation. On her last visit, I suggested to optimize constipation management and sent her to CT scan of abdomen. The imaging study confirmed presence of moderate amount of stool in the colon. 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  Recent EGD was suggestive for diffuse mild erythema of the stomach.  Pathology report confirmed mild inactive chronic gastritis.  H. pylori was negative.  Inconclusive colonoscopy findings due to poor bowel prep.  Recommended to repeat the study in 3 to 5 years given the family history of colonic polyps in mother and maternal grandmother.    Today, the patient reported improvement in her constipation- has been having bowel movements every other day. Reported good compliance with Miralax and bisacodyl. Takes lactulose as needed.  Complains of persistent acid reflux despite taking 40 mg of pantoprazole in the morning with her other pills and 40 mg of famotidine a day. Was not able to tolerate prescribed sucralfate due to abdominal pain. Stated that she experiences acid regurgitation and cough when bends forward, after a meal, and when lays down. No nausea or emesis. No dysphagia.    HISTORY:     has a past medical history of Abdominal pain, right lower quadrant (03/09/2008), Anxiety attack (07/31/2015), Atypical chest pain (06/23/2015), De Quervain's disease (tenosynovitis), Dehydration, Gastric ulcer (07/31/2015), GERD (gastroesophageal reflux disease) (07/28/2010), Ingrowing nail (01/09/2014), Migraines, Opioid dependence in  remission (H) (08/02/2015), Other and unspecified ovarian cyst, and Papanicolaou smear of cervix with low grade squamous intraepithelial lesion (LGSIL) (07/07/2017).     has a past surgical history that includes UPPER GI ENDOSCOPY,EXAM (01/07/08); Esophagoscopy, gastroscopy, duodenoscopy (EGD), combined (N/A, 4/17/2017); Colposcopy, biopsy, combined (N/A, 8/10/2017); Biopsy cervical, local excision, single/multiple (N/A, 8/10/2017); Exam under anesthesia pelvic (N/A, 8/10/2017); Laparoscopic hysterectomy total (N/A, 10/30/2017); Hysterectomy; Laparoscopic cholecystectomy (N/A, 2/2/2018); hysterectomy, pap no longer indicated; Inject epidural lumbar (Bilateral, 7/1/2022); Colonoscopy (N/A, 1/6/2023); and Esophagoscopy, gastroscopy, duodenoscopy (EGD), combined (N/A, 1/6/2023).     reports that she has been smoking cigarettes. She has a 5.00 pack-year smoking history. She has never used smokeless tobacco. She reports current alcohol use. She reports that she does not use drugs.    family history includes Adrenal Disorder in an other family member; Breast Cancer in her cousin; Cerebrovascular Disease in her father; Chronic Obstructive Pulmonary Disease in her mother and another family member; Depression in her mother; Respiratory in her mother.    ALLERGIES:     Allergies   Allergen Reactions     Ergotamine-Caffeine      12-            GI problems-     Seasonal Allergies      Sumatriptan      vomits after giving herself a shot     Compazine Anxiety     Droperidol Anxiety     Nubain [Nalbuphine Hcl] Anxiety     Prochlorperazine Palpitations     Uncontrolled movement       PERTINENT MEDICATIONS:    Current Outpatient Medications:      acetaminophen (TYLENOL) 500 MG tablet, Take 1,000 mg by mouth every 6 hours as needed for mild pain, Disp: , Rfl:      ALPRAZolam (XANAX) 1 MG tablet, TAKE ONE TABLET BY MOUTH TWICE A DAY, Disp: 60 tablet, Rfl: 0     baclofen (LIORESAL) 10 MG tablet, Start 5 mg daily x 3 days, then  increase to 5 mg twice daily x 1 week. If tolerating, may increase to 10 mg twice daily., Disp: 60 tablet, Rfl: 1     bisacodyl (DULCOLAX) 5 MG EC tablet, Take 1 tablet (5 mg) by mouth every other day (Patient taking differently: Take 5 mg by mouth every other day At night), Disp: 16 tablet, Rfl: 3     buprenorphine HCl-naloxone HCl (SUBOXONE) 2-0.5 MG per film, Place 0.5 Film under the tongue daily , Disp: , Rfl:      cetirizine (ZYRTEC) 10 MG tablet, TAKE ONE TABLET BY MOUTH EVERY MORNING (Patient taking differently: Take 10 mg by mouth daily), Disp: 90 tablet, Rfl: 3     cyclobenzaprine (FLEXERIL) 5 MG tablet, TAKE ONE TO TWO TABLETS BY MOUTH THREE TIMES A DAY AS NEEDED FOR MUSCLE SPASMS, Disp: 90 tablet, Rfl: 5     DULoxetine (CYMBALTA) 30 MG capsule, Take 1 capsule (30 mg) by mouth 2 times daily (Patient taking differently: Take 30 mg by mouth At Bedtime), Disp: 180 capsule, Rfl: 0     famotidine (PEPCID) 40 MG tablet, Take 1 tablet (40 mg) by mouth daily (Patient taking differently: Take 40 mg by mouth daily at 4pm), Disp: 30 tablet, Rfl: 11     lactulose (CHRONULAC) 10 GM/15ML solution, Take 10 g by mouth every 6 hours as needed for constipation, Disp: , Rfl:      Melatonin 10 MG TABS tablet, Take 10 mg by mouth nightly as needed for sleep, Disp: , Rfl:      ondansetron (ZOFRAN ODT) 4 MG ODT tab, DISSOLVE ONE TABLET BY MOUTH EVERY 6 HOURS AS NEEDED FOR NAUSEA, Disp: 20 tablet, Rfl: 1     pantoprazole (PROTONIX) 40 MG EC tablet, Take 1 tablet (40 mg) by mouth daily Take 30-60 min before breakfast. Stop omeprazole, Disp: 30 tablet, Rfl: 3     polyethylene glycol (MIRALAX) 17 GM/Dose powder, Take 17 g (1 capful) by mouth daily, Disp: 578 g, Rfl: 3     QUEtiapine (SEROQUEL) 100 MG tablet, Take 1.5 tablets (150 mg) by mouth At Bedtime for 14 days, THEN 1 tablet (100 mg) At Bedtime for 14 days, THEN 0.5 tablets (50 mg) At Bedtime for 14 days., Disp: 42 tablet, Rfl: 0     rOPINIRole (REQUIP) 1 MG tablet, TAKE ONE  TABLET BY MOUTH EVERY NIGHT AT BEDTIME, Disp: 90 tablet, Rfl: 0     simvastatin (ZOCOR) 10 MG tablet, TAKE ONE TABLET BY MOUTH EVERY NIGHT AT BEDTIME (Patient taking differently: Take 10 mg by mouth At Bedtime), Disp: 90 tablet, Rfl: 3     Turmeric 500 MG CAPS, Take 1 capsule by mouth daily, Disp: , Rfl:      zolpidem (AMBIEN) 5 MG tablet, TAKE ONE TABLET (5 MG) BY MOUTH NIGHTLY AS NEEDED FOR SLEEP, Disp: 30 tablet, Rfl: 0      ROS:   No fevers or chills  No weight loss  No blurry vision, double vision or change in vision  No sore throat  No lymphadenopathy  No headache, paraesthesias, or weakness in a limb  No shortness of breath or wheezing  No chest pain or pressure  No arthralgias or myalgias  No rashes or skin changes  No odynophagia or dysphagia  No  hematochezia, melena  No dysuria, frequency or urgency  No hot/cold intolerance or polyria  No anxiety or depression    PHYSICAL EXAMINATION:  Wt:   Wt Readings from Last 2 Encounters:   02/27/23 83.9 kg (185 lb)   01/06/23 78 kg (172 lb)      Physical Exam  Constitutional: aaox3, cooperative, pleasant, not dyspneic/diaphoretic, no acute distress  Eyes: Sclera anicteric/injected  Respiratory: Unlabored breathing, speaking in full sentences.   Psych: Normal affect, speech is clear and appropriate.Neatly groomed    Recent Labs   Lab Test 02/27/23  1701 07/06/21  1153   WBC 7.0 10.2   HGB 12.8 13.6   HCT 39.3 40.7    210     Recent Labs   Lab Test 07/06/21  1153 03/02/21  1403   ALT 34 38   AST 33 22     Recent Labs   Lab Test 02/27/23  1701 06/29/22  0958   CR 0.67 0.68     TSH   Date Value Ref Range Status   06/29/2022 3.51 0.40 - 4.00 mU/L Final   03/02/2021 2.42 0.40 - 4.00 mU/L Final         ASSESSMENT/PLAN:  Radha Beckwith is a 47 year old female who presents for a 2 months follow up on chronic constipation and GERD symptoms. Reported improvement in constipation after she started taking Miralax every day and bisacodyl every other day. She is  satisfied with her current bowel habit of having BM every other day.  Complains of persistent acid reflux, aggravated by eating, laying down, and bending forwards. Stated that she avoids acidic foods. Was not able to tolerate Carafate due to increase in abdominal pain per patient's report.   Before considering a differential diagnosis of possible non-acid reflux, will maximaze acid suppression therapy by increasing pantoprazole to twice a day dose for one month.  Reminded the patient to take famotidine before bed. Can take an additional 40 mg dose during a day if needed. OK to utilize TUMs or other OTC antiacids as needed.  If no improvement, my try a small dose of baclofen twice a day before meals. May consider repeating EGD and either proceed with BRAVO or ambulatory pH impedance study.   Explained to the patient that we reserve fundoplication for patients with symptoms refractory to medical therapy although predication of successful resolution of symptoms after the surgery is still challenging.      ICD-10-CM    1. Constipation due to opioid therapy  K59.03 famotidine (PEPCID) 40 MG tablet    T40.2X5A pantoprazole (PROTONIX) 40 MG EC tablet      2. Gastroesophageal reflux disease with esophagitis without hemorrhage  K21.00 famotidine (PEPCID) 40 MG tablet     pantoprazole (PROTONIX) 40 MG EC tablet     Adult GI Clinic Follow-Up Order (Blank)          Follow up in one month.  This note was created with Dragon voice recognition software. Inadvertent minor typographic errors may occur in transcription. Feel free to contact the provider if you have any questions.  I sincerity appreciate an opportunity to provide consultation for this pleasant patient.    CHAPIN Ponce  [facility],  Gastroenterology,  [place], [place]    Video-Visit Details    Type of service:  Video Visit    Video Start Time: 1412    Video End Time:1423    Originating Location (pt. Location): Home    Distant Location (provider location):    [place], [place]/off-site    Platform used for Video Visit: Mai

## 2023-05-02 ENCOUNTER — VIRTUAL VISIT (OUTPATIENT)
Dept: GASTROENTEROLOGY | Facility: CLINIC | Age: 48
End: 2023-05-02
Payer: COMMERCIAL

## 2023-05-02 DIAGNOSIS — K59.03 CONSTIPATION DUE TO OPIOID THERAPY: Primary | ICD-10-CM

## 2023-05-02 DIAGNOSIS — T40.2X5A CONSTIPATION DUE TO OPIOID THERAPY: Primary | ICD-10-CM

## 2023-05-02 DIAGNOSIS — K21.00 GASTROESOPHAGEAL REFLUX DISEASE WITH ESOPHAGITIS WITHOUT HEMORRHAGE: ICD-10-CM

## 2023-05-02 PROCEDURE — 99214 OFFICE O/P EST MOD 30 MIN: CPT | Mod: VID | Performed by: NURSE PRACTITIONER

## 2023-05-02 RX ORDER — FAMOTIDINE 40 MG/1
40 TABLET, FILM COATED ORAL 2 TIMES DAILY
Qty: 60 TABLET | Refills: 1 | Status: SHIPPED | OUTPATIENT
Start: 2023-05-02 | End: 2023-05-23

## 2023-05-02 RX ORDER — PANTOPRAZOLE SODIUM 40 MG/1
40 TABLET, DELAYED RELEASE ORAL
Qty: 60 TABLET | Refills: 1 | Status: SHIPPED | OUTPATIENT
Start: 2023-05-02 | End: 2023-07-26

## 2023-05-02 NOTE — PROGRESS NOTES
GASTROENTEROLOGY NEW PATIENT / RETURN VIRTUAL VISIT      How would you like to obtain your AVS? [email or web address]  If the video visit is dropped, the invitation should be resent by: Text to cell phone: [phone]  Will anyone else be joining your video visit? No  ..

## 2023-05-02 NOTE — PATIENT INSTRUCTIONS
"It was a pleasure taking care of you today.  I've included a brief summary of our discussion and care plan from today's visit below.  Please review this information with your primary care provider.  ______________________________________________________________________    My recommendations are summarized as follows:    Due to persistent acid reflux, I am going to maximize anti-acid therapy for the next month. Start taking pantoprazole before breakfast and before supper, 30-60 minutes before other pills and meals.    2. Take famotidine 40 mg before bed. OK to take one more dose during a day if needed.    3. Work on your diet changes: avoid overeating and laying down after a meal. Reduce portion size. Avoid greasy and spicy foods, chocolate, caffeine, pop, and alcohol. Too much sweets or bread can also cause acid reflux.    4. Continue the same medications for constipation as they seem to be working pretty well.    Return to GI Clinic in one month to review your progress.    ______________________________________________________________________  Gastroesophageal reflux  Gastroesophageal reflux, also called \"acid reflux,\" occurs when the stomach contents back up into the esophagus and/or mouth. Occasional reflux is normal and can happen in healthy infants, children, and adults, most often after eating a large meal. Most episodes are brief and do not cause bothersome symptoms or complications.   By contrast, people with gastroesophageal reflux disease (GERD) experience bothersome symptoms or damage to the esophagus as a result of acid reflux. Symptoms of GERD can include heartburn, regurgitation, and difficulty or pain with swallowing.     Lifestyle modifications for gastroesophageal reflux disease (GERD).   1. Change your eating habits.  -- It's best to eat several small meals instead of two or three large meals.  -- After you eat, wait 2 to 3 hours before you lie down. Late-night snacks aren't a good idea.   -- " "Chocolate, mint, and alcohol can make GERD worse. They relax the valve between the esophagus and the stomach.  -- Spicy foods, foods that have a lot of acid (like tomatoes and oranges), and coffee can make GERD symptoms worse in some people. If your symptoms are worse after you eat a certain food, you may want to stop eating that food to see if your symptoms get better.    2. Do not smoke or chew tobacco. Saliva helps to neutralize refluxed acid, and smoking reduces the amount of saliva in the mouth and throat. Smoking also lowers the pressure in the lower esophageal sphincter and provokes coughing, causing frequent episodes of acid reflux in the esophagus.     3. If you have GERD symptoms at night, raise the head of your bed 6 in. (15 cm) to 8 in. (20 cm) by putting the frame on blocks or placing a foam wedge under the head of your mattress. (Adding extra pillows does not work.)  4. Avoid or reduce pressure on your stomach. Don't wear tight clothing around your middle.  5. Lose weight if you need to. Losing just 5 to 10 pounds can help.           What is gastritis?  \"Gastritis\" means inflammation of the stomach lining.  Some people have gastritis that comes on suddenly and lasts only for a short time. Doctors call this \"acute\" gastritis. Other people have gastritis that lasts for months or years. Doctors call this \"chronic\" gastritis.  What causes gastritis?  Different things can cause gastritis, including:  ?An infection in the stomach from bacteria called \"H. pylori\"  ?Medicines called \"nonsteroidal antiinflammatory drugs\" (NSAIDs) - These include aspirin, ibuprofen,(brand names: Advil, Motrin), and naproxen (brand names: Aleve, Naprosyn).  ?Drinking alcohol  ?Conditions in which the body's infection-fighting system attacks the stomach lining  ?Having a serious or life-threatening illness  What are the symptoms of gastritis?  People with gastritis have no symptoms. When people do have symptoms, they are due to " "other conditions that can happen with gastritis, like ulcers. Symptoms from ulcers include:  ?Pain in the upper belly  ?Feeling bloated, or feeling full after eating a small amount of food  ?Decreased appetite  ?Nausea or vomiting  ?Vomiting blood, or having black-colored bowel movements  ?Feeling more tired than usual - This happens if people with gastritis get a condition called \"anemia.\"  Should I call my doctor or nurse?  Call your doctor or nurse if:  ?You have belly pain that gets worse or doesn't go away  ?You vomit blood or have black bowel movements  ?You are losing weight (without trying to)  Will I need tests?  Probably. Your doctor or nurse will ask about your symptoms and do an exam. They might also do:  ?An upper endoscopy - During this procedure, the doctor puts a thin tube with a camera on the end into your mouth and down into your stomach. They will look at the inside of your stomach. During the procedure, they might also do a test called a biopsy. For a biopsy, the doctor takes a small sample of the stomach lining. Then another doctor looks at the sample under a microscope.  ?Tests to check for H. pylori infection. These can include:  Blood tests  Breath tests - These tests measure substances in your breath after you drink a special liquid.  Tests on a small sample of your bowel movement  ?Blood tests to check for anemia  How is gastritis treated?  Treatment depends on what's causing your gastritis.  For example, if NSAIDs are causing your gastritis, your doctor will recommend that you not take those medicines. If alcohol is causing your gastritis, they will recommend that you stop drinking alcohol.  Doctors can use medicines to treat gastritis caused by an H. pylori infection. Most people take 3 or more medicines for 2 weeks. The treatment includes antibiotics plus medicine that helps the stomach make less acid.  Doctors can use medicines that reduce or block stomach acid to treat other causes of " gastritis. The main types of medicines that reduce or block stomach acid are:  ?Antacids  ?Surface agents  ?Histamine blockers  ?Proton pump inhibitors  If your doctor recommends acid-reducing treatment, they will tell you which medicine to use.  What happens after treatment?  Sometimes, people who are treated for an H. pylori infection need follow-up tests to make sure the infection is gone. Follow-up tests include breath tests, lab tests on a sample of bowel movement, or endoscopy.          ______________________________________________________________________    Who do I call with any questions after my visit?  Please be in touch if there are any further questions that arise following today's visit.  There are multiple ways to contact your gastroenterology care team.      During business hours, you may reach a Gastroenterology nurse at [phone], option [phone].     To schedule or reschedule an appointment, please call [phone].   To schedule your imaging studies (CT, MRI, ultrasound)  call [phone] (or toll-free # [phone])  To schedule your lab work at [facility], please call [phone]    You can always send a secure message through [employer].  [employer] messages are answered by your nurse or doctor typically within 24 hours.  Please allow extra time on weekends and holidays.      For urgent/emergent questions after business hours, you may reach the on-call GI Fellow by contacting the [facility]  at [phone].    In order for your refill to be processed in a timely fashion, it is your responsibility to ensure you follow the recommendations from your provider regarding your laboratory studies and follow up appointments.       How will I get the results of any tests ordered?    You will receive all of your results.  If you have signed up for [employer], any tests ordered at your visit will be available to you after your physician reviews them.  Typically this  takes 1-2 weeks.  If there are urgent results that require a change in your care plan, your physician or nurse will call you to discuss the next steps.   What is [employer]?  [employer] is a secure way for you to access all of your healthcare records from the [facility].  It is a web based computer program, so you can sign on to it from any location.  It also allows you to send secure messages to your care team.  I recommend signing up for [employer] access if you have not already done so and are comfortable with using a computer.    How to I schedule a follow-up visit?  If you did not schedule a follow-up visit today, please call [phone] to schedule a follow-up office visit.      Sincerely,  CHAPIN Ponce,  [facility],  Division of Gastroenterology   ([facility])

## 2023-05-03 DIAGNOSIS — F51.04 PSYCHOPHYSIOLOGICAL INSOMNIA: ICD-10-CM

## 2023-05-04 RX ORDER — ZOLPIDEM TARTRATE 5 MG/1
TABLET ORAL
Qty: 30 TABLET | Refills: 0 | Status: SHIPPED | OUTPATIENT
Start: 2023-05-04 | End: 2023-06-05

## 2023-05-10 ASSESSMENT — ANXIETY QUESTIONNAIRES
6. BECOMING EASILY ANNOYED OR IRRITABLE: NEARLY EVERY DAY
8. IF YOU CHECKED OFF ANY PROBLEMS, HOW DIFFICULT HAVE THESE MADE IT FOR YOU TO DO YOUR WORK, TAKE CARE OF THINGS AT HOME, OR GET ALONG WITH OTHER PEOPLE?: EXTREMELY DIFFICULT
GAD7 TOTAL SCORE: 17
5. BEING SO RESTLESS THAT IT IS HARD TO SIT STILL: SEVERAL DAYS
4. TROUBLE RELAXING: SEVERAL DAYS
GAD7 TOTAL SCORE: 17
7. FEELING AFRAID AS IF SOMETHING AWFUL MIGHT HAPPEN: NEARLY EVERY DAY
GAD7 TOTAL SCORE: 17
3. WORRYING TOO MUCH ABOUT DIFFERENT THINGS: NEARLY EVERY DAY
2. NOT BEING ABLE TO STOP OR CONTROL WORRYING: NEARLY EVERY DAY
IF YOU CHECKED OFF ANY PROBLEMS ON THIS QUESTIONNAIRE, HOW DIFFICULT HAVE THESE PROBLEMS MADE IT FOR YOU TO DO YOUR WORK, TAKE CARE OF THINGS AT HOME, OR GET ALONG WITH OTHER PEOPLE: EXTREMELY DIFFICULT
7. FEELING AFRAID AS IF SOMETHING AWFUL MIGHT HAPPEN: NEARLY EVERY DAY
1. FEELING NERVOUS, ANXIOUS, OR ON EDGE: NEARLY EVERY DAY

## 2023-05-16 ENCOUNTER — RADIOLOGY INJECTION OFFICE VISIT (OUTPATIENT)
Dept: PALLIATIVE MEDICINE | Facility: CLINIC | Age: 48
End: 2023-05-16
Payer: COMMERCIAL

## 2023-05-16 VITALS — DIASTOLIC BLOOD PRESSURE: 92 MMHG | HEART RATE: 95 BPM | OXYGEN SATURATION: 97 % | SYSTOLIC BLOOD PRESSURE: 143 MMHG

## 2023-05-16 DIAGNOSIS — M53.3 PAIN OF BOTH SACROILIAC JOINTS: ICD-10-CM

## 2023-05-16 DIAGNOSIS — M53.3 SI (SACROILIAC) JOINT DYSFUNCTION: ICD-10-CM

## 2023-05-16 PROCEDURE — 27096 INJECT SACROILIAC JOINT: CPT | Mod: 50 | Performed by: PAIN MEDICINE

## 2023-05-16 RX ORDER — TRIAMCINOLONE ACETONIDE 40 MG/ML
40 INJECTION, SUSPENSION INTRA-ARTICULAR; INTRAMUSCULAR ONCE
Status: COMPLETED | OUTPATIENT
Start: 2023-05-16 | End: 2023-05-16

## 2023-05-16 RX ADMIN — TRIAMCINOLONE ACETONIDE 40 MG: 40 INJECTION, SUSPENSION INTRA-ARTICULAR; INTRAMUSCULAR at 15:12

## 2023-05-16 ASSESSMENT — PAIN SCALES - GENERAL
PAINLEVEL: EXTREME PAIN (9)
PAINLEVEL: SEVERE PAIN (6)

## 2023-05-16 NOTE — NURSING NOTE
Pre-procedure Intake  If YES to any questions or NO to having a   Please complete laminated checklist and leave on the computer keyboard for Provider, verbally inform provider if able.    For SCS Trial, RFA's or any sedation procedure:  Have you been fasting? NA    If yes, for how long? NA    Are you taking any any blood thinners such as Coumadin, Warfarin, Jantoven, Pradaxa Xarelto, Eliquis, Edoxaban, Enoxaparin, Lovenox, Heparin, Arixtra, Fondaparinux, or Fragmin? OR Antiplatelet medication such as Plavix, Brilinta, or Effient?   No     If yes, when did you take your last dose? NA    Do you take aspirin?  No    If cervical procedure, have you held aspirin for 6 days?   NA    Do you have any allergies to contrast dye, iodine, steroid and/or numbing medications?  NO    Are you currently taking antibiotics or have an active infection?  NO    Have you had a fever/elevated temperature within the past week? NO    Are you currently taking oral steroids? NO    Do you have a ? Yes    Are you pregnant or breastfeeding?  NO    Have you received the COVID-19 vaccine? NA    If yes, was it your 1st, 2nd or only dose needed? NA    Date of most recent vaccine: NA    Notify provider and RNs if systolic BP >170, diastolic BP >100, P >100 or O2 sats < 90%      Linsey Ulloa CMA (AAMA)

## 2023-05-16 NOTE — PATIENT INSTRUCTIONS
[facility]   Procedure Discharge Instructions    Today you saw:    Dr. King Reynaga         You had an:  sacroiliac joint injection             Be cautious when walking. Numbness and/or weakness in the lower extremities may occur for up to 6-8 hours after the procedure due to effect of the local anesthetic  Do not drive for 6 hours. The effect of the local anesthetic could slow your reflexes.   You may resume your regular activities after 24 hours  Avoid strenuous activity for the first 24 hours  You may shower, however avoid swimming, tub baths or hot tubs for 24 hours following your procedure  You may have a mild to moderate increase in pain for several days following the injection.  It may take up to 14 days for the steroid medication to start working although you may feel the effect as early as a few days after the procedure.     You may use ice packs for 10-15 minutes, 3 to 4 times a day at the injection site for comfort  Do not use heat to painful areas for 6 to 8 hours. This will give the local anesthetic time to wear off and prevent you from accidentally burning your skin.   Unless you have been directed to avoid the use of anti-inflammatory medications (NSAIDS), you may use medications such as ibuprofen, Aleve or Tylenol for pain control if needed.   Possible side effects of steroids that you may experience include flushing, elevated blood pressure, increased appetite, mild headaches and restlessness.  All of these symptoms will get better with time.  If you experience any of the following, call the Pain Clinic during work hours (Mon-Friday 8-4:30 pm) at [phone] or the Provider Line after hours at [phone]:  -Fever over 100 degree F  -Swelling, bleeding, redness, drainage, warmth at the injection site  -Progressive weakness or numbness in your legs or arms  -Loss of bowel or bladder function  -Unusual headache that is not relieved by Tylenol or other pain  reliever  -Unusual new onset of pain that is not improving

## 2023-05-16 NOTE — NURSING NOTE
Discharge Information    IV Discontiued Time:  NA    Amount of Fluid Infused:  NA    Discharge Criteria = When patient returns to baseline or as per MD order    Consciousness:  Pt is fully awake    Circulation:  BP +/- 20% of pre-procedure level    Respiration:  Patient is able to breathe deeply    O2 Sat:  Patient is able to maintain O2 Sat >92% on room air    Activity:  Moves 4 extremities on command    Ambulation:  Patient is able to stand and walk or stand and pivot into wheelchair    Dressing:  Clean/dry or No Dressing    Notes:   Discharge instructions and AVS given to patient    Patient meets criteria for discharge?  YES    Admitted to PCU?  No    Responsible adult present to accompany patient home?  Yes    Signature/Title:    Reema Gaffney RN  RN Care Coordinator  [facility]

## 2023-05-17 ENCOUNTER — VIRTUAL VISIT (OUTPATIENT)
Dept: BEHAVIORAL HEALTH | Facility: CLINIC | Age: 48
End: 2023-05-17
Payer: COMMERCIAL

## 2023-05-17 DIAGNOSIS — F33.1 MODERATE EPISODE OF RECURRENT MAJOR DEPRESSIVE DISORDER (H): Primary | ICD-10-CM

## 2023-05-17 PROCEDURE — 90834 PSYTX W PT 45 MINUTES: CPT | Mod: VID | Performed by: SOCIAL WORKER

## 2023-05-17 NOTE — PROGRESS NOTES
Pre procedure Diagnosis: SI joint dysfunction    Post procedure Diagnosis: Same  Procedure performed: bilateral SI joint injection  Anesthesia: none  Complications: none  Operators: King Reynaga MD     Indications:   Radha Beckwith is a 47 year old female. The patient has a history of upper buttocks pain. Other conservative treatments prior to injection include meds/pt.    Options/alternatives, benefits and risks were discussed with the patient including bleeding, infection, tissue trauma, exposure to radiation, reaction to medications including seizure, nerve injury, weakness, and numbness.  Questions were answered to her satisfaction and she agrees to proceed. Voluntary informed consent was obtained and signed.     Vitals were reviewed: Yes  Allergies were reviewed:  Yes   Medications were reviewed:  Yes   Pre-procedure pain score: 9/10    Procedure:  After obtaining signed informed consent, the patient was brought into the procedure suite and was placed in a prone position on the procedure table.   A Pause for the Cause was performed.  The patient was prepped and draped in the usual sterile fashion.     After identifying the bilateral SI joint, the C-arm was rotated obliquely to obtain the best view of the inferior angle of the joint.  Then 4 ml of Lidocaine 1%  was used to anesthetize the skin at a skin entry site coaxial with the fluoroscopy beam at this location.  A 22 gauge 3.5 inch needle was advanced under intermittent fluoroscopy until it was felt to enter the SI joint.  Aspiration was negative.    A total of 1ml of Omnipaque-300 was injected, confirming appropriate position, with spread into the intraarticular space, with no intravascular uptake noted.  9ml of Omnipaque was wasted. Location was verified in lateral view.    2 ml of 0.5% bupivacaine with 40mg of Kenalog was injected.  The needle was removed.     Hemostasis was achieved, the area was cleaned, and bandaids were placed when  appropriate.  The patient tolerated the procedure well, and was taken to the recovery room.  Images were saved to PACS.    Post-procedure pain score: 6/10  Follow-up includes:   -f/u phone call in one week  -f/u with referring Physician     King Reynaga MD  [facility] [place]

## 2023-05-17 NOTE — PROGRESS NOTES
[facility]                                     Progress Note    Patient Name: Radha Beckwith  Date: 5/17/23         Service Type: Individual      Session Start Time: 1300  Session End Time: 1352     Session Length:52    Session #: 26    Attendees: Client    Service Modality:  Video Visit:      Provider verified identity through the following two step process.  Patient provided:  Patient is known previously to provider    Telemedicine Visit: The patient's condition can be safely assessed and treated via synchronous audio and visual telemedicine encounter.      Reason for Telemedicine Visit: Patient has requested telehealth visit    Originating Site (Patient Location): Patient's home    Distant Site (Provider Location): Provider Remote Setting- Home Office    Consent:  The patient/guardian has verbally consented to: the potential risks and benefits of telemedicine (video visit) versus in person care; bill my insurance or make self-payment for services provided; and responsibility for payment of non-covered services.     Patient would like the video invitation sent by:  Send to e-mail at: [email or web address]    Mode of Communication:  Video Conference via [facility]    Distant Location (Provider):  Off-site    As the provider I attest to compliance with applicable laws and regulations related to telemedicine.    DATA  Interactive Complexity: No  Crisis: No        Progress Since Last Session (Related to Symptoms / Goals / Homework):   Symptoms: Improving. Mood improving. More optimistic.    Homework: Achieved / completed to satisfaction      Episode of Care Goals: Satisfactory progress - ACTION (Actively working towards change); Intervened by reinforcing change plan / affirming steps taken     Current / Ongoing Stressors and Concerns:  Back pain continues.Will be having teeth taken out next week.     Treatment Objective(s) Addressed in This Session:   During todays session discussed writers  upcoming departure and plan going forward. Pt. Indicated that she is trying to organize things in her home although, has had difficulty doing so when experiencing back pain.      Intervention:   CBT, solution focused therapy              Time -line of her life       Assessments completed prior to visit:  The following assessments were completed by patient for this visit:  PHQ9:       11/8/2022    11:16 AM 12/22/2022    10:19 AM 1/1/2023    11:37 AM 2/7/2023    12:54 PM 2/14/2023    11:36 AM 3/27/2023     2:53 PM 4/17/2023    12:41 PM   PHQ-9 SCORE   PHQ-9 Total Score MyChart 18 (Moderately severe depression) 18 (Moderately severe depression) 18 (Moderately severe depression) 18 (Moderately severe depression) 16 (Moderately severe depression) 16 (Moderately severe depression) 18 (Moderately severe depression)   PHQ-9 Total Score 18 18 18 18 16 16 18    18     GAD7:       12/4/2022     9:53 PM 1/1/2023    11:37 AM 2/14/2023    11:38 AM 3/25/2023     3:01 PM 4/17/2023    12:45 PM 4/26/2023     1:00 PM 5/10/2023    12:51 PM   BO-7 SCORE   Total Score 19 (severe anxiety) 20 (severe anxiety) 17 (severe anxiety) 16 (severe anxiety) 15 (severe anxiety)  17 (severe anxiety)   Total Score 19 20 17    17    17    17    17 16 15    15 18 17     PROMIS 10-Global Health (all questions and answers displayed):       10/26/2022    12:50 PM 1/25/2023    12:48 PM 2/7/2023    12:57 PM 4/17/2023     2:12 PM   PROMIS 10   In general, would you say your health is: Fair Poor Poor Poor   In general, would you say your quality of life is: Poor Poor Poor Poor   In general, how would you rate your physical health? Poor Poor Poor Poor   In general, how would you rate your mental health, including your mood and your ability to think? Fair Fair Poor Fair   In general, how would you rate your satisfaction with your social activities and relationships? Poor Fair Poor Fair   In general, please rate how well you carry out your usual social  activities and roles Poor Fair Poor Fair   To what extent are you able to carry out your everyday physical activities such as walking, climbing stairs, carrying groceries, or moving a chair? A little A little A little A little   In the past 7 days, how often have you been bothered by emotional problems such as feeling anxious, depressed, or irritable? Always Always Always Always   In the past 7 days, how would you rate your fatigue on average? Moderate Severe Severe Moderate   In the past 7 days, how would you rate your pain on average, where 0 means no pain, and 10 means worst imaginable pain? 8 8 9 8   In general, would you say your health is: 2 1 1 1    1   In general, would you say your quality of life is: 1 1 1 1    1   In general, how would you rate your physical health? 1 1 1 1    1   In general, how would you rate your mental health, including your mood and your ability to think? 2 2 1 2    2   In general, how would you rate your satisfaction with your social activities and relationships? 1 2 1 2    2   In general, please rate how well you carry out your usual social activities and roles. (This includes activities at home, at work and in your community, and responsibilities as a parent, child, spouse, employee, friend, etc.) 1 2 1 2    2   To what extent are you able to carry out your everyday physical activities such as walking, climbing stairs, carrying groceries, or moving a chair? 2 2 2 2    2   In the past 7 days, how often have you been bothered by emotional problems such as feeling anxious, depressed, or irritable? 5 5 5 5    5   In the past 7 days, how would you rate your fatigue on average? 3 4 4 3    3   In the past 7 days, how would you rate your pain on average, where 0 means no pain, and 10 means worst imaginable pain? 8 8 9 8    8   Global Mental Health Score 5 6 4 6    6   Global Physical Health Score 8 7 7 8    8   PROMIS TOTAL - SUBSCORES 13 13 11 14    14         ASSESSMENT: Current  Emotional / Mental Status (status of significant symptoms):   Risk status (Self / Other harm or suicidal ideation)   Patient denies current fears or concerns for personal safety.   Patient denies current or recent suicidal ideation or behaviors.   Patient denies current or recent homicidal ideation or behaviors.   Patient denies current or recent self injurious behavior or ideation.   Patient denies other safety concerns.   Patient reports there has been no change in risk factors since their last session.     Patient reports there has been no change in protective factors since their last session.     Recommended that patient call 911 or go to the local ED should there be a change in any of these risk factors.     Appearance:   Appropriate    Eye Contact:   Good    Psychomotor Behavior: Normal    Attitude:   Cooperative    Orientation:   All   Speech    Rate / Production: Normal     Volume:  Normal    Mood:    Normal   Affect:    Appropriate    Thought Content:  Clear    Thought Form:  Coherent  Logical    Insight:    Good      Medication Review:   No changes to current psychiatric medication(s)     Medication Compliance:   Yes     Changes in Health Issues:   Back pain. Has had to get pain shots. Tingling in arms. Teeth will be pulled next Monday.      Chemical Use Review:   Substance Use: Chemical use reviewed, no active concerns identified      Tobacco Use: No current tobacco use.      Diagnosis:  Generalized anxiety disorder     Collateral Reports Completed:   Routed note to PCP    PLAN: (Patient Tasks / Therapist Tasks / Other)  1.  Will be transitioning to another Therapist due to writer transferring to another Department.  2.  Stop thought processing techniques   3.  Patient will look into again applying for Social Security.    Swetha Weinberg LICSW  5/17/23                                                          ______________________________________________________________________  ______________________________________________________________________  Individual Treatment Plan     Patient's Name: Radha Beckwith                    YOB: 1975     Date of Creation: 12/5/22  Date Treatment Plan Last Reviewed/Revised: 4/10/2023     DSM5 Diagnoses: Generalized anxiety disorder  Psychosocial / Contextual Factors: Current stressors with holiday season coming up and medical concerns.  PROMIS (reviewed every 90 days):      Referral / Collaboration:  Referral to another professional/service is not indicated at this time..     Anticipated number of session for this episode of care: 6-9 sessions  Anticipation frequency of session: Biweekly  Anticipated Duration of each session: 38-52 minutes  Treatment plan will be reviewed in 90 days or when goals have been changed.         MeasurableTreatment Goal(s) related to diagnosis / functional impairment(s)  MeasurableTreatment Goal(s) related to diagnosis / functional impairment(s)  Goal 1: Patient will focus on recognizing and managing symptoms of anxiety as evidenced by a BO-7 score of 5 or less    I will know I've met my goal when I do not have to call and reach out for extra support from the clinic.       Objective #A (Patient Action)                          Patient will practice deep breathing at least 3-5 a day.  Status: Continued Date 4/10/23     Objective #B  Patient will attend and participate in social or recreational activities At least 3 times per week.  Status: Continued Date 4/10/23        Objective #C  Patient will use at least 5 coping skills for anxiety management in the next 12 weeks.  Status: Continued Date 4/10/23              Objective #D                Patient will use thought-stopping strategy daily to reduce intrusive thoughts.  Status:Continued Date 4/10/23           Objective #E  Patient will use distraction each time intrusive worry  surfaces.                       Status: Continued Date 4/10/23           Objective #F  Patient will Improve quantity and quality of night time sleep / decrease daytime naps.  Status: Continued Date 4/10/23           Intervention(s)  Therapist will teach CBT skills to challenge cognitive distortions and core beliefs.  Therapist will teach and model positive self-talk behaviors.  Therapist will use psychodynamic approaches to explore early attachments and schemas.  Therapist will teach DBT mindfulness and emotion regulation skills.                Patient has reviewed and agreed to the above plan.   Continued Date 4/10/23        Swetha Weinberg [facility]        4/10/23           Answers for HPI/ROS submitted by the patient on 3/27/2023  If you checked off any problems, how difficult have these problems made it for you to do your work, take care of things at home, or get along with other people?: Extremely difficult  PHQ9 TOTAL SCORE: 16  BO 7 TOTAL SCORE: 16  Answers for HPI/ROS submitted by the patient on 5/10/2023  BO 7 TOTAL SCORE: 17

## 2023-05-18 DIAGNOSIS — F41.9 ANXIETY: ICD-10-CM

## 2023-05-18 DIAGNOSIS — R11.0 NAUSEA: ICD-10-CM

## 2023-05-19 RX ORDER — ALPRAZOLAM 0.5 MG
1 TABLET ORAL 2 TIMES DAILY
Qty: 60 TABLET | Refills: 0 | Status: SHIPPED | OUTPATIENT
Start: 2023-05-19 | End: 2023-06-19

## 2023-05-19 RX ORDER — ONDANSETRON 4 MG/1
TABLET, ORALLY DISINTEGRATING ORAL
Qty: 20 TABLET | Refills: 1 | Status: SHIPPED | OUTPATIENT
Start: 2023-05-19 | End: 2023-07-26

## 2023-05-19 NOTE — TELEPHONE ENCOUNTER
Pending Prescriptions:                       Disp   Refills    ALPRAZolam (XANAX) 1 MG tablet [Pharmacy M*60 tab*0        Sig: TAKE ONE TABLET BY MOUTH TWICE A DAY    ondansetron (ZOFRAN ODT) 4 MG ODT tab [Pha*20 tab*1        Sig: DISSOLVE ONE TABLET ON TONGUE EVERY SIX HOURS AS           NEEDED FOR NAUSEA      Routing refill request to provider for review/approval because:  Drug not on the [facility] refill protocol     Jahaira Adams RN on 5/19/2023 at 10:07 AM

## 2023-05-23 ENCOUNTER — HOSPITAL ENCOUNTER (EMERGENCY)
Facility: CLINIC | Age: 48
Discharge: HOME | End: 2023-05-23
Attending: PHYSICIAN ASSISTANT | Admitting: PHYSICIAN ASSISTANT
Payer: COMMERCIAL

## 2023-05-23 VITALS
OXYGEN SATURATION: 99 % | BODY MASS INDEX: 31.39 KG/M2 | HEART RATE: 121 BPM | SYSTOLIC BLOOD PRESSURE: 169 MMHG | RESPIRATION RATE: 20 BRPM | TEMPERATURE: 98.1 F | WEIGHT: 182.9 LBS | DIASTOLIC BLOOD PRESSURE: 112 MMHG

## 2023-05-23 DIAGNOSIS — K13.79 MOUTH PAIN: ICD-10-CM

## 2023-05-23 PROCEDURE — 250N000011 HC RX IP 250 OP 636: Performed by: PHYSICIAN ASSISTANT

## 2023-05-23 PROCEDURE — 96372 THER/PROPH/DIAG INJ SC/IM: CPT | Performed by: PHYSICIAN ASSISTANT

## 2023-05-23 PROCEDURE — 99284 EMERGENCY DEPT VISIT MOD MDM: CPT | Performed by: PHYSICIAN ASSISTANT

## 2023-05-23 RX ORDER — HYDROCODONE BITARTRATE AND ACETAMINOPHEN 5; 325 MG/1; MG/1
1 TABLET ORAL EVERY 6 HOURS PRN
COMMUNITY
Start: 2023-05-22 | End: 2023-06-07

## 2023-05-23 RX ORDER — CHLORHEXIDINE GLUCONATE ORAL RINSE 1.2 MG/ML
30 SOLUTION DENTAL 2 TIMES DAILY
COMMUNITY
Start: 2023-05-22 | End: 2023-09-26

## 2023-05-23 RX ORDER — KETOROLAC TROMETHAMINE 30 MG/ML
30 INJECTION, SOLUTION INTRAMUSCULAR; INTRAVENOUS ONCE
Status: COMPLETED | OUTPATIENT
Start: 2023-05-23 | End: 2023-05-23

## 2023-05-23 RX ORDER — AMOXICILLIN 500 MG/1
500 CAPSULE ORAL 3 TIMES DAILY
COMMUNITY
Start: 2023-05-22 | End: 2023-07-05

## 2023-05-23 RX ORDER — FAMOTIDINE 40 MG/1
40 TABLET, FILM COATED ORAL 2 TIMES DAILY PRN
COMMUNITY
End: 2023-09-07

## 2023-05-23 RX ORDER — HYDROMORPHONE HYDROCHLORIDE 2 MG/1
2 TABLET ORAL EVERY 6 HOURS PRN
Qty: 10 TABLET | Refills: 0 | Status: SHIPPED | OUTPATIENT
Start: 2023-05-23 | End: 2023-05-26

## 2023-05-23 RX ADMIN — KETOROLAC TROMETHAMINE 30 MG: 30 INJECTION, SOLUTION INTRAMUSCULAR at 19:17

## 2023-05-23 RX ADMIN — HYDROMORPHONE HYDROCHLORIDE 1 MG: 1 INJECTION, SOLUTION INTRAMUSCULAR; INTRAVENOUS; SUBCUTANEOUS at 19:17

## 2023-05-23 ASSESSMENT — ACTIVITIES OF DAILY LIVING (ADL): ADLS_ACUITY_SCORE: 33

## 2023-05-23 NOTE — ED TRIAGE NOTES
"Pt presents with mouth pain. Pt had \"all of my teeth removed yesterday .\"  Pt only given a few tablets of hydrocodone.      Triage Assessment     Row Name 05/23/23 [id number]       Triage Assessment (Adult)    Airway WDL WDL       Respiratory WDL    Respiratory WDL WDL       Skin Circulation/Temperature WDL    Skin Circulation/Temperature WDL WDL       Cardiac WDL    Cardiac WDL WDL       Peripheral/Neurovascular WDL    Peripheral Neurovascular WDL WDL       Cognitive/Neuro/Behavioral WDL    Cognitive/Neuro/Behavioral WDL WDL              "

## 2023-05-24 NOTE — ED PROVIDER NOTES
History     Chief Complaint   Patient presents with     Dental Pain     HPI  Radha Beckwith is a 47 year old female who presents for evaluation of mouth pain.  She had all of her teeth removed, close to 25 of them, yesterday by oral surgery in [facility] yesterday.  She was sent home with hydrocodone, but states this medication is not helping.  Pain is rated 10 on a scale of 10.  She feels like there is some drainage coming out of the right upper jaw area as well.  Unable to sleep last night.  Denies any injury or fall.        Allergies:  Allergies   Allergen Reactions     Ergotamine-Caffeine      12-            GI problems-     Seasonal Allergies      Sumatriptan      vomits after giving herself a shot     Compazine Anxiety     Droperidol Anxiety     Nubain [Nalbuphine Hcl] Anxiety     Prochlorperazine Palpitations     Uncontrolled movement       Problem List:    Patient Active Problem List    Diagnosis Date Noted     Moderate episode of recurrent major depressive disorder (H) 06/29/2022     Priority: Medium     Moderate major depression (H) 06/03/2019     Priority: Medium     Supraventricular tachycardia (H) 06/03/2019     Priority: Medium     Psychophysiological insomnia 12/30/2017     Priority: Medium     Chronic bilateral low back pain without sciatica 12/30/2017     Priority: Medium     Opioid dependence in remission (H) 08/02/2015     Priority: Medium     On suboxone treatement with Bryant Harkins.  (Noted in 2015).         Anxiety attack 07/31/2015     Priority: Medium     Hypokalemia 06/23/2015     Priority: Medium     Tobacco abuse 06/23/2015     Priority: Medium     Hyperlipidemia LDL goal <100 01/29/2014     Priority: Medium     Anxiety 08/19/2013     Priority: Medium     GERD (gastroesophageal reflux disease) 07/28/2010     Priority: Medium     Takes omeprazole and metoclopramide       Restless legs 07/28/2010     Priority: Medium        Past Medical History:     Past Medical History:   Diagnosis Date     Abdominal pain, right lower quadrant 03/09/2008     Anxiety attack 07/31/2015     Atypical chest pain 06/23/2015     De Quervain's disease (tenosynovitis)      Dehydration      Gastric ulcer 07/31/2015     GERD (gastroesophageal reflux disease) 07/28/2010     Ingrowing nail 01/09/2014     Migraines      Opioid dependence in remission (H) 08/02/2015     Other and unspecified ovarian cyst      Papanicolaou smear of cervix with low grade squamous intraepithelial lesion (LGSIL) 07/07/2017       Past Surgical History:    Past Surgical History:   Procedure Laterality Date     BIOPSY CERVICAL, LOCAL EXCISION, SINGLE/MULTIPLE N/A 8/10/2017    Procedure: BIOPSY CERVICAL, LOCAL EXCISION, SINGLE/MULTIPLE;;  Surgeon: Michael Chandler MD;  Location: [facility]     COLONOSCOPY N/A 1/6/2023    Procedure: COLONOSCOPY;  Surgeon: Michael Dozier MD;  Location: [facility]     COLPOSCOPY, BIOPSY, COMBINED N/A 8/10/2017    Procedure: COMBINED COLPOSCOPY, BIOPSY;  Colposcopy with Cervical Biopsies and Endometrial Biopsy, Exam with Ultrasound;  Surgeon: Michael Chandler MD;  Location[facility]     ESOPHAGOSCOPY, GASTROSCOPY, DUODENOSCOPY (EGD), COMBINED N/A 4/17/2017    Procedure: COMBINED ESOPHAGOSCOPY, GASTROSCOPY, DUODENOSCOPY (EGD);  Surgeon: Ibrahima Esposito MD;  Location[facility]     ESOPHAGOSCOPY, GASTROSCOPY, DUODENOSCOPY (EGD), COMBINED N/A 1/6/2023    Procedure: ESOPHAGOGASTRODUODENOSCOPY, WITH BIOPSY;  Surgeon: Michael Dozier MD;  [facility]     EXAM UNDER ANESTHESIA PELVIC N/A 8/10/2017    Procedure: EXAM UNDER ANESTHESIA PELVIC;;  Surgeon: Michael Chandler MD;  [facility]: [facility]     HYSTERECTOMY       HYSTERECTOMY, PAP NO LONGER INDICATED       INJECT EPIDURAL LUMBAR Bilateral 7/1/2022    Procedure: Lumbar 5-Sacral 1 Transforaminal Epidural Steroid Injection with fluoroscopic guidance and contrast, bilateral;  Surgeon: Trevor Ivory MD;  Location: [facility]      LAPAROSCOPIC CHOLECYSTECTOMY N/A 2/2/2018    Procedure: LAPAROSCOPIC CHOLECYSTECTOMY;  Laparoscopic Cholecystectomy;  Surgeon: Tigre Lowry DO;  Location: [facility]     LAPAROSCOPIC HYSTERECTOMY TOTAL N/A 10/30/2017    Procedure: LAPAROSCOPIC HYSTERECTOMY TOTAL;  LAPAROSCOPIC HYSTERECTOMY TOTAL POSSIBLE SALPINGO-OOPHERECTOMY (BILATERAL);  Surgeon: Michael Chandler MD;  Location:  OR     [facility] UGI ENDOSCOPY, SIMPLE EXAM  01/07/08       Family History:    Family History   Problem Relation Age of Onset     Depression Mother      Respiratory Mother      Chronic Obstructive Pulmonary Disease Mother      Cerebrovascular Disease Father         Brain anyeurism     Breast Cancer Cousin      Adrenal Disorder Other      Chronic Obstructive Pulmonary Disease Other        Social History:  Marital Status:  Single [1]  Social History     Tobacco Use     Smoking status: Every Day     Packs/day: 0.25     Years: 20.00     Pack years: 5.00     Types: Cigarettes     Smokeless tobacco: Never   Vaping Use     Vaping status: Some Days     Substances: Nicotine, CBD     Devices: Refillable tank     Passive vaping exposure: Yes   Substance Use Topics     Alcohol use: Yes     Comment: occasional drinks; about 5-6 beers/week     Drug use: No        Medications:    acetaminophen (TYLENOL) 500 MG tablet  ALPRAZolam (XANAX) 0.5 MG tablet  amoxicillin (AMOXIL) 500 MG capsule  baclofen (LIORESAL) 10 MG tablet  bisacodyl (DULCOLAX) 5 MG EC tablet  buprenorphine HCl-naloxone HCl (SUBOXONE) 2-0.5 MG per film  cetirizine (ZYRTEC) 10 MG tablet  chlorhexidine (PERIDEX) 0.12 % solution  cyclobenzaprine (FLEXERIL) 5 MG tablet  DULoxetine (CYMBALTA) 30 MG capsule  famotidine (PEPCID) 40 MG tablet  HYDROcodone-acetaminophen (NORCO) 5-325 MG tablet  HYDROmorphone (DILAUDID) 2 MG tablet  lactulose (CHRONULAC) 10 GM/15ML solution  ondansetron (ZOFRAN ODT) 4 MG ODT tab  pantoprazole (PROTONIX) 40 MG EC tablet  polyethylene glycol (MIRALAX)  17 GM/Dose powder  QUEtiapine (SEROQUEL) 100 MG tablet  rOPINIRole (REQUIP) 1 MG tablet  simvastatin (ZOCOR) 10 MG tablet  zolpidem (AMBIEN) 5 MG tablet          Review of Systems   All other systems reviewed and are negative.      Physical Exam   BP: (!) 169/112  Pulse: (!) 121  Temp: 98.1  F (36.7  C)  Resp: 16  Weight: 83 kg (182 lb 14.4 oz)  SpO2: 99 %      Physical Exam  Vitals and nursing note reviewed.   Constitutional:       General: She is not in acute distress.     Appearance: She is not diaphoretic.      Comments: Crying   HENT:      Head: Normocephalic and atraumatic.      Right Ear: External ear normal.      Left Ear: External ear normal.      Nose: Nose normal.      Mouth/Throat:      Pharynx: No oropharyngeal exudate.      Comments: No trismus or malocclusion.  Absorbable sutures in place to the upper and lower jaw area.  There is some mild swelling over the right eye tooth area and what appears to be some potential pus.  Tender to palpation in the area.  No significant gingival swelling.  No facial swelling.  No asymmetry.  No soft tissue neck swelling.  Eyes:      General: No scleral icterus.        Right eye: No discharge.         Left eye: No discharge.      Conjunctiva/sclera: Conjunctivae normal.      Pupils: Pupils are equal, round, and reactive to light.   Neck:      Thyroid: No thyromegaly.   Cardiovascular:      Rate and Rhythm: Normal rate and regular rhythm.      Heart sounds: Normal heart sounds. No murmur heard.  Pulmonary:      Effort: Pulmonary effort is normal. No respiratory distress.      Breath sounds: Normal breath sounds. No wheezing or rales.   Chest:      Chest wall: No tenderness.   Abdominal:      General: Bowel sounds are normal. There is no distension.      Palpations: Abdomen is soft. There is no mass.      Tenderness: There is no abdominal tenderness. There is no guarding or rebound.   Musculoskeletal:         General: No tenderness or deformity. Normal range of motion.       Cervical back: Normal range of motion and neck supple.   Lymphadenopathy:      Cervical: No cervical adenopathy.   Skin:     General: Skin is warm and dry.      Capillary Refill: Capillary refill takes less than 2 seconds.      Findings: No erythema or rash.   Neurological:      Mental Status: She is alert and oriented to person, place, and time.      Cranial Nerves: No cranial nerve deficit.   Psychiatric:         Behavior: Behavior normal.         Thought Content: Thought content normal.         ED Course                 Procedures              Critical Care time:  none               No results found for this or any previous visit (from the past 24 hour(s)).    Medications   HYDROmorphone (DILAUDID) injection 1 mg (1 mg Intramuscular $Given 5/23/23 1917)   ketorolac (TORADOL) injection 30 mg (30 mg Intramuscular $Given 5/23/23 1917)       Assessments & Plan (with Medical Decision Making)  Mouth pain     47 year old female presents for evaluation of mouth pain.  Status post 25 teeth being removed yesterday by oral surgery.  Hydrocodone not helping.  She questions if in the areas may be getting infected in the right upper jaw.  No fevers or chills.  On exam blood pressure 169/112, temperature 98.1, pulse 121, respiration 16, oxygen saturation 99% on room air.  Patient is tearful.  No trismus or malocclusion.  No facial swelling.  No soft tissue neck swelling.  There are sutures in place of the upper and large.  Some minor swelling of the right eye tooth area of the upper jaw.  Some mild yellow drainage.  Nothing can really be expressed any further.  No bleeding.  No sign of trauma.  She was given IM injection of Dilaudid and IM Toradol.  Reported significant improvement in her symptoms.  I clarified with the patient and she actually stopped her Suboxone prior to her dental procedure so that medication would be more effective in treatment of her acute pain.  Therefore, I did send her home with a p.o. Dilaudid  Rx.  She will continue her outpatient antibiotic therapy as she just started it.  She has only been doing her mouth rinse 1 time per day.  I recommended that she do this at least 4 times daily.  Return if having any increased gum swelling, fever, or facial swelling.  Outpatient pharmacy contacted me and stated that her insurance would not allow the Dilaudid Rx without a prior approval.  Unfortunately, this cannot go through after hours.  Pharmacy stated that they could fill her Suboxone Rx which was waiting for her.  Therefore, I think it is most appropriate to go back on her Suboxone Rx for pain management moving forward here.  Follow-up with primary care if having issues.  ED return instructions reviewed with the patient in detail.  She will continue to follow-up with the oral surgeon for any further dental issues.  She was in agreement with this plan and was suitable for discharge.  Family member was present to drive her home     I have reviewed the nursing notes.    I have reviewed the findings, diagnosis, plan and need for follow up with the patient.           Medical Decision Making  The patient's presentation was of moderate complexity (an acute illness with systemic symptoms).    The patient's evaluation involved:  history and exam without other MDM data elements    The patient's management necessitated high risk (a parenteral controlled substance).        Discharge Medication List as of 5/23/2023  8:14 PM      START taking these medications    Details   HYDROmorphone (DILAUDID) 2 MG tablet Take 1 tablet (2 mg) by mouth every 6 hours as needed for pain, Disp-10 tablet, R-0, E-Prescribe             Final diagnoses:   Mouth pain     Disclaimer: This note consists of symbols derived from keyboarding, dictation and/or voice recognition software. As a result, there may be errors in the script that have gone undetected. Please consider this when interpreting information found in this chart.      5/23/2023   SHILPA  [facility] EMERGENCY DEPT     Irvin, Gene Nicole PA-C  05/23/23 [id number]

## 2023-05-24 NOTE — DISCHARGE INSTRUCTIONS
It was a pleasure working with you today!  I hope your condition improves rapidly!     Please increase your mouth rinses to at least 4 times daily.  I would try using a heating pad to your mouth area to see if this helps with your pain.  Do this for 20 minutes every 1-2 hours.  It is okay to use Tylenol up to 1000 mg every 6 hours as needed for pain.  Reserve the Dilaudid for severe breakthrough pain.  Follow-up with your oral surgeon for any changes in your mouth pain.

## 2023-05-24 NOTE — MEDICATION SCRIBE - ADMISSION MEDICATION HISTORY
Medication Scribe Admission Medication History    Admission medication history is complete. The information provided in this note is only as accurate as the sources available at the time of the update.    Medication reconciliation/reorder completed by provider prior to medication history? No    Information Source(s): Patient via in-person    Pertinent Information: oral surgery yesterday    Changes made to PTA medication list:    Added: amoxicillin, peridex, norco    Deleted: turmeric, melatonin    Changed: baclofen to current dose, dulcolax to prn, pepcid to prn, seroquel to current dose    Medication Affordability:  Not including over the counter (OTC) medications, was there a time in the past 3 months when you did not take your medications as prescribed because of cost?: No    Allergies reviewed with patient and updates made in EHR: yes    Medication History Completed By: NATE ETIENNE 5/23/2023 7:23 PM    Prior to Admission medications    Medication Sig Last Dose Taking? Auth Provider Long Term End Date   acetaminophen (TYLENOL) 500 MG tablet Take 1,000 mg by mouth every 6 hours as needed for mild pain 5/23/2023 at 1730 Yes Reported, Patient     ALPRAZolam (XANAX) 0.5 MG tablet Take 2 tablets (1 mg) by mouth 2 times daily 5/23/2023 at 1500 1st Yes Gisselle Linn, NP     baclofen (LIORESAL) 10 MG tablet Start 5 mg daily x 3 days, then increase to 5 mg twice daily x 1 week. If tolerating, may increase to 10 mg twice daily.  Patient taking differently: Take 10 mg by mouth 2 times daily 5/23/2023 at am Yes Jenny Landrum APRN CNP     bisacodyl (DULCOLAX) 5 MG EC tablet Take 1 tablet (5 mg) by mouth every other day  Patient taking differently: Take 5 mg by mouth nightly as needed for constipation Past Week at hs Yes Madelaine Arredondo APRN CNP     buprenorphine HCl-naloxone HCl (SUBOXONE) 2-0.5 MG per film Place 0.5 Film under the tongue daily  Past Week at unk Yes Reported, Patient No    cetirizine (ZYRTEC) 10 MG  tablet TAKE ONE TABLET BY MOUTH EVERY MORNING  Patient taking differently: Take 10 mg by mouth daily 5/23/2023 at am Yes Johnny Berrios MD     cyclobenzaprine (FLEXERIL) 5 MG tablet TAKE ONE TO TWO TABLETS BY MOUTH THREE TIMES A DAY AS NEEDED FOR MUSCLE SPASMS  Patient taking differently: Take 5-10 mg by mouth 3 times daily as needed for muscle spasms 5/22/2023 at hs Yes Gisselle Linn NP     DULoxetine (CYMBALTA) 30 MG capsule Take 1 capsule (30 mg) by mouth 2 times daily 5/23/2023 at am Yes Gisselle Linn NP Yes    famotidine (PEPCID) 40 MG tablet Take 40 mg by mouth 2 times daily as needed for heartburn Past Month at am Yes Reported, Patient     lactulose (CHRONULAC) 10 GM/15ML solution Take 10 g by mouth every 6 hours as needed for constipation Past Month at unk Yes Reported, Patient     ondansetron (ZOFRAN ODT) 4 MG ODT tab DISSOLVE ONE TABLET ON TONGUE EVERY SIX HOURS AS NEEDED FOR NAUSEA 5/23/2023 at 1200 Yes Gisselle Linn NP     pantoprazole (PROTONIX) 40 MG EC tablet Take 1 tablet (40 mg) by mouth 2 times daily (before meals) 5/23/2023 at am Yes Madelaine Arredondo APRN CNP     polyethylene glycol (MIRALAX) 17 GM/Dose powder Take 17 g (1 capful) by mouth daily Past Week at unk Yes Madelaine Arredondo APRN CNP     QUEtiapine (SEROQUEL) 100 MG tablet Take 1.5 tablets (150 mg) by mouth At Bedtime for 14 days, THEN 1 tablet (100 mg) At Bedtime for 14 days, THEN 0.5 tablets (50 mg) At Bedtime for 14 days.  Patient taking differently: 0.5 tablets (50 mg) At Bedtime for 14 days. 5/22/2023 at hs Yes Gisselle Linn NP Yes 5/31/23   rOPINIRole (REQUIP) 1 MG tablet TAKE ONE TABLET BY MOUTH EVERY NIGHT AT BEDTIME  Patient taking differently: Take 1 mg by mouth At Bedtime 5/22/2023 at hs Yes Gisselle Linn NP Yes    simvastatin (ZOCOR) 10 MG tablet TAKE ONE TABLET BY MOUTH EVERY NIGHT AT BEDTIME  Patient taking differently: Take 10 mg by mouth At Bedtime 5/22/2023 at hs Yes Gisselle Linn, NP  Yes    zolpidem (AMBIEN) 5 MG tablet TAKE ONE TABLET (5 MG) BY MOUTH NIGHTLY AS NEEDED FOR SLEEP  Patient taking differently: Take 5 mg by mouth nightly as needed for sleep 5/22/2023 at hs Yes Gisselle Linn, NP

## 2023-06-02 DIAGNOSIS — F51.04 PSYCHOPHYSIOLOGICAL INSOMNIA: ICD-10-CM

## 2023-06-05 RX ORDER — ZOLPIDEM TARTRATE 5 MG/1
TABLET ORAL
Qty: 30 TABLET | Refills: 0 | Status: SHIPPED | OUTPATIENT
Start: 2023-06-05 | End: 2023-07-06

## 2023-06-07 ENCOUNTER — VIRTUAL VISIT (OUTPATIENT)
Dept: GASTROENTEROLOGY | Facility: CLINIC | Age: 48
End: 2023-06-07
Attending: NURSE PRACTITIONER
Payer: COMMERCIAL

## 2023-06-07 DIAGNOSIS — K59.03 CONSTIPATION DUE TO OPIOID THERAPY: Primary | ICD-10-CM

## 2023-06-07 DIAGNOSIS — K21.00 GASTROESOPHAGEAL REFLUX DISEASE WITH ESOPHAGITIS WITHOUT HEMORRHAGE: ICD-10-CM

## 2023-06-07 DIAGNOSIS — T40.2X5A CONSTIPATION DUE TO OPIOID THERAPY: Primary | ICD-10-CM

## 2023-06-07 PROCEDURE — 99214 OFFICE O/P EST MOD 30 MIN: CPT | Mod: VID | Performed by: NURSE PRACTITIONER

## 2023-06-07 RX ORDER — BISACODYL 5 MG/1
5 TABLET, DELAYED RELEASE ORAL EVERY OTHER DAY
Qty: 30 TABLET | Refills: 3 | Status: SHIPPED | OUTPATIENT
Start: 2023-06-07 | End: 2024-01-08

## 2023-06-07 NOTE — PATIENT INSTRUCTIONS
"It was a pleasure taking care of you today.  I've included a brief summary of our discussion and care plan from today's visit below.  Please review this information with your primary care provider.  ______________________________________________________________________    My recommendations are summarized as follows:    Please resume Miralax every day and bisacodyl every other day for constipation management. I placed a new order for bisacodyl.    2. Take pantoprazole twice a day, 30-60 min before a meal, and famotidine before bed.    3. As we decided, my care  will call you in one month and re-assess your symptoms. If symptoms persist, I'll see you back in 3 months and may consider to repeat upper endoscopy and do a BRAVO test for acid reflux.    4. Continue baclofen twice a day. It should help your acid reflux as well as back pain.    Return to GI Clinic in 3 months to review your progress.    ______________________________________________________________________    What is gastritis?  \"Gastritis\" means inflammation of the stomach lining.  Some people have gastritis that comes on suddenly and lasts only for a short time. Doctors call this \"acute\" gastritis. Other people have gastritis that lasts for months or years. Doctors call this \"chronic\" gastritis.  What causes gastritis?  Different things can cause gastritis, including:  ?An infection in the stomach from bacteria called \"H. pylori\"  ?Medicines called \"nonsteroidal antiinflammatory drugs\" (NSAIDs) - These include aspirin, ibuprofen,(brand names: Advil, Motrin), and naproxen (brand names: Aleve, Naprosyn).  ?Drinking alcohol  ?Conditions in which the body's infection-fighting system attacks the stomach lining  ?Having a serious or life-threatening illness  What are the symptoms of gastritis?  People with gastritis have no symptoms. When people do have symptoms, they are due to other conditions that can happen with gastritis, like ulcers. " "Symptoms from ulcers include:  ?Pain in the upper belly  ?Feeling bloated, or feeling full after eating a small amount of food  ?Decreased appetite  ?Nausea or vomiting  ?Vomiting blood, or having black-colored bowel movements  ?Feeling more tired than usual - This happens if people with gastritis get a condition called \"anemia.\"  Should I call my doctor or nurse?  Call your doctor or nurse if:  ?You have belly pain that gets worse or doesn't go away  ?You vomit blood or have black bowel movements  ?You are losing weight (without trying to)  Will I need tests?  Probably. Your doctor or nurse will ask about your symptoms and do an exam. They might also do:  ?An upper endoscopy - During this procedure, the doctor puts a thin tube with a camera on the end into your mouth and down into your stomach. They will look at the inside of your stomach. During the procedure, they might also do a test called a biopsy. For a biopsy, the doctor takes a small sample of the stomach lining. Then another doctor looks at the sample under a microscope.  ?Tests to check for H. pylori infection. These can include:  Blood tests  Breath tests - These tests measure substances in your breath after you drink a special liquid.  Tests on a small sample of your bowel movement  ?Blood tests to check for anemia  How is gastritis treated?  Treatment depends on what's causing your gastritis.  For example, if NSAIDs are causing your gastritis, your doctor will recommend that you not take those medicines. If alcohol is causing your gastritis, they will recommend that you stop drinking alcohol.  Doctors can use medicines to treat gastritis caused by an H. pylori infection. Most people take 3 or more medicines for 2 weeks. The treatment includes antibiotics plus medicine that helps the stomach make less acid.  Doctors can use medicines that reduce or block stomach acid to treat other causes of gastritis. The main types of medicines that reduce or block " stomach acid are:  ?Antacids  ?Surface agents  ?Histamine blockers  ?Proton pump inhibitors  If your doctor recommends acid-reducing treatment, they will tell you which medicine to use.  What happens after treatment?  Sometimes, people who are treated for an H. pylori infection need follow-up tests to make sure the infection is gone. Follow-up tests include breath tests, lab tests on a sample of bowel movement, or endoscopy.          ______________________________________________________________________    Who do I call with any questions after my visit?  Please be in touch if there are any further questions that arise following today's visit.  There are multiple ways to contact your gastroenterology care team.      During business hours, you may reach a Gastroenterology nurse at [phone], option 3.     To schedule or reschedule an appointment, please call [phone].   To schedule your imaging studies (CT, MRI, ultrasound)  call [phone] (or toll-free # [phone])  To schedule your lab work at [facility], please call [phone]    You can always send a secure message through LoiLo.  LoiLo messages are answered by your nurse or doctor typically within 24 hours.  Please allow extra time on weekends and holidays.      For urgent/emergent questions after business hours, you may reach the on-call GI Fellow by contacting the [facility]  at [phone].    In order for your refill to be processed in a timely fashion, it is your responsibility to ensure you follow the recommendations from your provider regarding your laboratory studies and follow up appointments.       How will I get the results of any tests ordered?    You will receive all of your results.  If you have signed up for LoiLo, any tests ordered at your visit will be available to you after your physician reviews them.  Typically this takes 1-2 weeks.  If there are urgent results that require  a change in your care plan, your physician or nurse will call you to discuss the next steps.   What is [employer]?  V[employer] is a secure way for you to access all of your healthcare records from the [facility].  It is a web based computer program, so you can sign on to it from any location.  It also allows you to send secure messages to your care team.  I recommend signing up for Vibe[employer] access if you have not already done so and are comfortable with using a computer.    How to I schedule a follow-up visit?  If you did not schedule a follow-up visit today, please call [phone] to schedule a follow-up office visit.      Sincerely,  CHAPIN Ponce,  [facility],  Division of Gastroenterology   ([facility])

## 2023-06-07 NOTE — PROGRESS NOTES
GASTROENTEROLOGY RETURN PATIENT VIRTUAL VISIT      How would you like to obtain your AVS? MyChart  If the video visit is dropped, the invitation should be resent by: Text to cell phone: [phone]  Will anyone else be joining your video visit? No  ..    Video-Visit Details    Type of service:  Video Visit    Video Start Time (time video started): 1423    Video End Time (time video stopped): 1433    Originating Location (pt. Location): Home        Distant Location (provider location):  Off-site    Mode of Communication:  Video Conference via [facility]    Physician has received verbal consent for a Video Visit from the patient? Yes      GASTROENTEROLOGY RETURN PATIENT VIDEO VISIT    CC/REFERRING MD:    Gisselle Linn    REASON FOR CONSULTATION:   Referred by Madelaine Arredondo for follow up.    HISTORY OF PRESENT ILLNESS:  Radha Beckwith is 47 year old female who presents for follow up on acid reflux and constipation. On her last visit, I suggested to optimize constipation management and sent her to CT scan of abdomen. The imaging study confirmed presence of moderate amount of stool in the colon. 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 Patient stated that she ran out bisacodyl and Miralax and has not been taking those for about one week. Plans to restart medications of Monday.    Complains of intermittent acid reflux, improved after we increased pantoprazole to twice a day dosing and encouraged the patient to take famotidine before bed instead of as needed. Patient is planning to start a food/symptoms diary next week. So far she noticed that her GERD symptoms are aggravated by consuming acidic or spicy foods, laying down, and bending forwards. Was not able to tolerate Carafate due to increase in abdominal pain  per patient's report. Takes baclofen twice a day for back pain. Schedule to have back injections in a few weeks.  Last EGD in Jan.2023, was suggestive for diffuse mild erythema of the stomach.  Pathology report confirmed mild inactive chronic gastritis.  H. pylori was negative.  Inconclusive colonoscopy findings due to poor bowel prep.  Recommended to repeat the study in 3 to 5 years given the family history of colonic polyps in mother and maternal grandmother.    No new symptoms. Recently, had extraction of multiple teeth. Has difficulties with chewing foods, but is trying to maintain proper nutrition.    HISTORY:     has a past medical history of Abdominal pain, right lower quadrant (03/09/2008), Anxiety attack (07/31/2015), Atypical chest pain (06/23/2015), De Quervain's disease (tenosynovitis), Dehydration, Gastric ulcer (07/31/2015), GERD (gastroesophageal reflux disease) (07/28/2010), Ingrowing nail (01/09/2014), Migraines, Opioid dependence in remission (H) (08/02/2015), Other and unspecified ovarian cyst, and Papanicolaou smear of cervix with low grade squamous intraepithelial lesion (LGSIL) (07/07/2017).     has a past surgical history that includes UPPER GI ENDOSCOPY,EXAM (01/07/08); Esophagoscopy, gastroscopy, duodenoscopy (EGD), combined (N/A, 4/17/2017); Colposcopy, biopsy, combined (N/A, 8/10/2017); Biopsy cervical, local excision, single/multiple (N/A, 8/10/2017); Exam under anesthesia pelvic (N/A, 8/10/2017); Laparoscopic hysterectomy total (N/A, 10/30/2017); Hysterectomy; Laparoscopic cholecystectomy (N/A, 2/2/2018); hysterectomy, pap no longer indicated; Inject epidural lumbar (Bilateral, 7/1/2022); Colonoscopy (N/A, 1/6/2023); and Esophagoscopy, gastroscopy, duodenoscopy (EGD), combined (N/A, 1/6/2023).     reports that she has been smoking cigarettes. She has a 5.00 pack-year smoking history. She has never used smokeless tobacco. She reports current alcohol use. She reports that she does not use  drugs.    family history includes Adrenal Disorder in an other family member; Breast Cancer in her cousin; Cerebrovascular Disease in her father; Chronic Obstructive Pulmonary Disease in her mother and another family member; Depression in her mother; Respiratory in her mother.    ALLERGIES:     Allergies   Allergen Reactions     Ergotamine-Caffeine      12-            GI problems-     Seasonal Allergies      Sumatriptan      vomits after giving herself a shot     Compazine Anxiety     Droperidol Anxiety     Nubain [Nalbuphine Hcl] Anxiety     Prochlorperazine Palpitations     Uncontrolled movement       PERTINENT MEDICATIONS:    Current Outpatient Medications:      acetaminophen (TYLENOL) 500 MG tablet, Take 1,000 mg by mouth every 6 hours as needed for mild pain, Disp: , Rfl:      ALPRAZolam (XANAX) 0.5 MG tablet, Take 2 tablets (1 mg) by mouth 2 times daily, Disp: 60 tablet, Rfl: 0     amoxicillin (AMOXIL) 500 MG capsule, Take 500 mg by mouth 3 times daily Oral surgery, Disp: , Rfl:      baclofen (LIORESAL) 10 MG tablet, Start 5 mg daily x 3 days, then increase to 5 mg twice daily x 1 week. If tolerating, may increase to 10 mg twice daily. (Patient taking differently: Take 10 mg by mouth 2 times daily), Disp: 60 tablet, Rfl: 1     bisacodyl (DULCOLAX) 5 MG EC tablet, Take 1 tablet (5 mg) by mouth every other day (Patient taking differently: Take 5 mg by mouth nightly as needed for constipation), Disp: 16 tablet, Rfl: 3     buprenorphine HCl-naloxone HCl (SUBOXONE) 2-0.5 MG per film, Place 0.5 Film under the tongue daily , Disp: , Rfl:      cetirizine (ZYRTEC) 10 MG tablet, TAKE ONE TABLET BY MOUTH EVERY MORNING (Patient taking differently: Take 10 mg by mouth daily), Disp: 90 tablet, Rfl: 3     chlorhexidine (PERIDEX) 0.12 % solution, Swish and spit 30 mLs in mouth 2 times daily, Disp: , Rfl:      cyclobenzaprine (FLEXERIL) 5 MG tablet, TAKE ONE TO TWO TABLETS BY MOUTH THREE TIMES A DAY AS NEEDED FOR MUSCLE  SPASMS (Patient taking differently: Take 5-10 mg by mouth 3 times daily as needed for muscle spasms), Disp: 90 tablet, Rfl: 5     DULoxetine (CYMBALTA) 30 MG capsule, Take 1 capsule (30 mg) by mouth 2 times daily, Disp: 180 capsule, Rfl: 0     famotidine (PEPCID) 40 MG tablet, Take 40 mg by mouth 2 times daily as needed for heartburn, Disp: , Rfl:      HYDROcodone-acetaminophen (NORCO) 5-325 MG tablet, Take 1 tablet by mouth every 6 hours as needed for pain, Disp: , Rfl:      lactulose (CHRONULAC) 10 GM/15ML solution, Take 10 g by mouth every 6 hours as needed for constipation, Disp: , Rfl:      ondansetron (ZOFRAN ODT) 4 MG ODT tab, DISSOLVE ONE TABLET ON TONGUE EVERY SIX HOURS AS NEEDED FOR NAUSEA, Disp: 20 tablet, Rfl: 1     pantoprazole (PROTONIX) 40 MG EC tablet, Take 1 tablet (40 mg) by mouth 2 times daily (before meals), Disp: 60 tablet, Rfl: 1     polyethylene glycol (MIRALAX) 17 GM/Dose powder, Take 17 g (1 capful) by mouth daily, Disp: 578 g, Rfl: 3     QUEtiapine (SEROQUEL) 100 MG tablet, Take 1.5 tablets (150 mg) by mouth At Bedtime for 14 days, THEN 1 tablet (100 mg) At Bedtime for 14 days, THEN 0.5 tablets (50 mg) At Bedtime for 14 days. (Patient taking differently: 0.5 tablets (50 mg) At Bedtime for 14 days.), Disp: 42 tablet, Rfl: 0     rOPINIRole (REQUIP) 1 MG tablet, TAKE ONE TABLET BY MOUTH EVERY NIGHT AT BEDTIME (Patient taking differently: Take 1 mg by mouth At Bedtime), Disp: 90 tablet, Rfl: 0     simvastatin (ZOCOR) 10 MG tablet, TAKE ONE TABLET BY MOUTH EVERY NIGHT AT BEDTIME (Patient taking differently: Take 10 mg by mouth At Bedtime), Disp: 90 tablet, Rfl: 3     zolpidem (AMBIEN) 5 MG tablet, TAKE ONE TABLET (5 MG) BY MOUTH NIGHTLY AS NEEDED FOR SLEEP, Disp: 30 tablet, Rfl: 0      ROS:     No fevers or chills  No weight loss  No blurry vision, double vision or change in vision  No sore throat  No lymphadenopathy  No headache, paraesthesias, or weakness in a limb  No shortness of breath or  wheezing  No chest pain or pressure  No arthralgias or myalgias  No rashes or skin changes  No odynophagia or dysphagia  No  hematochezia, melena  No dysuria, frequency or urgency  No hot/cold intolerance or polyria  No anxiety or depression    PHYSICAL EXAMINATION:  Wt:   Wt Readings from Last 2 Encounters:   05/23/23 83 kg (182 lb 14.4 oz)   02/27/23 83.9 kg (185 lb)      Physical Exam  Vitals reviewed: LMP  (LMP Unknown)    Constitutional: aaox3, cooperative, pleasant, not dyspneic/diaphoretic, no acute distress  Eyes: Sclera anicteric/injected  Respiratory: Unlabored breathing, speaking in full sentences.   Psych: Normal affect, speech is clear and appropriate.Neatly groomed      Recent Labs   Lab Test 02/27/23  1701 07/06/21  1153   WBC 7.0 10.2   HGB 12.8 13.6   HCT 39.3 40.7    210     Recent Labs   Lab Test 07/06/21  1153 03/02/21  1403   ALT 34 38   AST 33 22     Recent Labs   Lab Test 02/27/23  1701 06/29/22  0958   CR 0.67 0.68     TSH   Date Value Ref Range Status   06/29/2022 3.51 0.40 - 4.00 mU/L Final   03/02/2021 2.42 0.40 - 4.00 mU/L Final         ASSESSMENT/PLAN:  Radha Beckwith is a 47 year old female who presents for a follow up on drug-induced constipation and persistent GERD symptoms. Recurrence of constipation as the patient has not been taking Miralax and bisacodyl for about one week. Plans to restart both meds on Monday. Requests a new prescription for bisacodyl; the request was honored.  Reported some improvement in acid reflux and chest burning after we increased the dose of pantoprazole to 40 mg twice a day and started famotidine every night. Patient believes that there is a correlation between her symptoms and foods she eats. Planning to start a food/symptoms diary next week.  Will continue medications at the current doses including baclofen twice a day (that patient takes for back pain).   May consider repeating EGD and to either proceed with BRAVO or ambulatory pH impedance  study if symptoms persist. Patient is not interested in additional studies at this time. She just underwent multiple tooth extractions. Will be waiting for denture fitting.   Explained to the patient that we reserve fundoplication for patients with symptoms refractory to medical therapy although predication of successful resolution of symptoms after the surgery is still challenging.  Patient verbalized understanding and appreciation of care provided. Patient's questions were answered to her satisfaction. She requested a phone call from our GI team staff to re-assess her symptoms in one month (sent a message to Karma LOVING MA, and CONG Arias).      ICD-10-CM    1. Constipation due to opioid therapy  K59.03 bisacodyl (DULCOLAX) 5 MG EC tablet    T40.2X5A       2. Gastroesophageal reflux disease with esophagitis without hemorrhage  K21.00 Adult GI Clinic Follow-Up Order (Blank)     bisacodyl (DULCOLAX) 5 MG EC tablet           Follow up in 3 months  This note was created with Dragon voice recognition software. Inadvertent minor typographic errors may occur in transcription. Feel free to contact the provider if you have any questions.  I sincerely appreciate an opportunity to provide consultation for this pleasant patient.    CHAPIN Ponce  [facility],  Gastroenterology,  [place], [place]

## 2023-06-12 ENCOUNTER — VIRTUAL VISIT (OUTPATIENT)
Dept: BEHAVIORAL HEALTH | Facility: CLINIC | Age: 48
End: 2023-06-12
Payer: COMMERCIAL

## 2023-06-12 DIAGNOSIS — F41.1 GENERALIZED ANXIETY DISORDER: Primary | ICD-10-CM

## 2023-06-12 DIAGNOSIS — F33.1 MODERATE EPISODE OF RECURRENT MAJOR DEPRESSIVE DISORDER (H): ICD-10-CM

## 2023-06-12 PROCEDURE — 90834 PSYTX W PT 45 MINUTES: CPT | Mod: VID | Performed by: SOCIAL WORKER

## 2023-06-12 ASSESSMENT — PATIENT HEALTH QUESTIONNAIRE - PHQ9
SUM OF ALL RESPONSES TO PHQ QUESTIONS 1-9: 20
10. IF YOU CHECKED OFF ANY PROBLEMS, HOW DIFFICULT HAVE THESE PROBLEMS MADE IT FOR YOU TO DO YOUR WORK, TAKE CARE OF THINGS AT HOME, OR GET ALONG WITH OTHER PEOPLE: EXTREMELY DIFFICULT
SUM OF ALL RESPONSES TO PHQ QUESTIONS 1-9: 20

## 2023-06-12 ASSESSMENT — ANXIETY QUESTIONNAIRES
IF YOU CHECKED OFF ANY PROBLEMS ON THIS QUESTIONNAIRE, HOW DIFFICULT HAVE THESE PROBLEMS MADE IT FOR YOU TO DO YOUR WORK, TAKE CARE OF THINGS AT HOME, OR GET ALONG WITH OTHER PEOPLE: EXTREMELY DIFFICULT
5. BEING SO RESTLESS THAT IT IS HARD TO SIT STILL: SEVERAL DAYS
3. WORRYING TOO MUCH ABOUT DIFFERENT THINGS: NEARLY EVERY DAY
8. IF YOU CHECKED OFF ANY PROBLEMS, HOW DIFFICULT HAVE THESE MADE IT FOR YOU TO DO YOUR WORK, TAKE CARE OF THINGS AT HOME, OR GET ALONG WITH OTHER PEOPLE?: EXTREMELY DIFFICULT
7. FEELING AFRAID AS IF SOMETHING AWFUL MIGHT HAPPEN: NEARLY EVERY DAY
8. IF YOU CHECKED OFF ANY PROBLEMS, HOW DIFFICULT HAVE THESE MADE IT FOR YOU TO DO YOUR WORK, TAKE CARE OF THINGS AT HOME, OR GET ALONG WITH OTHER PEOPLE?: EXTREMELY DIFFICULT
7. FEELING AFRAID AS IF SOMETHING AWFUL MIGHT HAPPEN: NEARLY EVERY DAY
4. TROUBLE RELAXING: NEARLY EVERY DAY
2. NOT BEING ABLE TO STOP OR CONTROL WORRYING: NEARLY EVERY DAY
7. FEELING AFRAID AS IF SOMETHING AWFUL MIGHT HAPPEN: NEARLY EVERY DAY
GAD7 TOTAL SCORE: 19
5. BEING SO RESTLESS THAT IT IS HARD TO SIT STILL: SEVERAL DAYS
GAD7 TOTAL SCORE: 19
4. TROUBLE RELAXING: NEARLY EVERY DAY
7. FEELING AFRAID AS IF SOMETHING AWFUL MIGHT HAPPEN: NEARLY EVERY DAY
6. BECOMING EASILY ANNOYED OR IRRITABLE: NEARLY EVERY DAY
2. NOT BEING ABLE TO STOP OR CONTROL WORRYING: NEARLY EVERY DAY
IF YOU CHECKED OFF ANY PROBLEMS ON THIS QUESTIONNAIRE, HOW DIFFICULT HAVE THESE PROBLEMS MADE IT FOR YOU TO DO YOUR WORK, TAKE CARE OF THINGS AT HOME, OR GET ALONG WITH OTHER PEOPLE: EXTREMELY DIFFICULT
6. BECOMING EASILY ANNOYED OR IRRITABLE: NEARLY EVERY DAY
GAD7 TOTAL SCORE: 19
3. WORRYING TOO MUCH ABOUT DIFFERENT THINGS: NEARLY EVERY DAY
1. FEELING NERVOUS, ANXIOUS, OR ON EDGE: NEARLY EVERY DAY
GAD7 TOTAL SCORE: 19
1. FEELING NERVOUS, ANXIOUS, OR ON EDGE: NEARLY EVERY DAY

## 2023-06-12 NOTE — PROGRESS NOTES
[facility]                                     Progress Note    Patient Name: Radha Beckwith  Date: 6/12/23         Service Type: Individual      Session Start Time: 1100  Session End Time: 1152     Session Length: 52    Session #: 27    Attendees: Client    Service Modality:  Video Visit:      Provider verified identity through the following two step process.  Patient provided:  Patient is known previously to provider    Telemedicine Visit: The patient's condition can be safely assessed and treated via synchronous audio and visual telemedicine encounter.      Reason for Telemedicine Visit: Patient has requested telehealth visit    Originating Site (Patient Location): Patient's home    Distant Site (Provider Location): Provider Remote Setting- Home Office    Consent:  The patient/guardian has verbally consented to: the potential risks and benefits of telemedicine (video visit) versus in person care; bill my insurance or make self-payment for services provided; and responsibility for payment of non-covered services.     Patient would like the video invitation sent by:  Send to e-mail at: [email or web address]    Mode of Communication:  Video Conference via [facility]    Distant Location (Provider):  Off-site    As the provider I attest to compliance with applicable laws and regulations related to telemedicine.    DATA  Interactive Complexity: No  Crisis: No        Progress Since Last Session (Related to Symptoms / Goals / Homework):   Symptoms: Slight improvement with mood. More difficult when in pain. Looking forward to son coming home to visit  with-in the next few weeks.    Homework: Partially completed      Episode of Care Goals: Satisfactory progress - ACTION (Actively working towards change); Intervened by reinforcing change plan / affirming steps taken     Current / Ongoing Stressors and Concerns:   Recent tooth extraction. Adjusting to physical appearance.      Treatment Objective(s)  Addressed in This Session:   During this session discussed the current status of her relationship and how her back pain impedes her overall health and well-being. Discussed ways of validating the role she plays in the home. Developing a list of To Do's. Will be transitioning to another therapist due to writer going to another clinic location.       Intervention:   CBT, solution focused therapy              Time -line of her life       Assessments completed prior to visit:  The following assessments were completed by patient for this visit:  PHQ9:       12/22/2022    10:19 AM 1/1/2023    11:37 AM 2/7/2023    12:54 PM 2/14/2023    11:36 AM 3/27/2023     2:53 PM 4/17/2023    12:41 PM 6/12/2023    10:17 AM   PHQ-9 SCORE   PHQ-9 Total Score MyChart 18 (Moderately severe depression) 18 (Moderately severe depression) 18 (Moderately severe depression) 16 (Moderately severe depression) 16 (Moderately severe depression) 18 (Moderately severe depression) 20 (Severe depression)   PHQ-9 Total Score 18 18 18 16 16 18    18 20     GAD7:       1/1/2023    11:37 AM 2/14/2023    11:38 AM 3/25/2023     3:01 PM 4/17/2023    12:45 PM 4/26/2023     1:00 PM 5/10/2023    12:51 PM 6/12/2023    10:19 AM   BO-7 SCORE   Total Score 20 (severe anxiety) 17 (severe anxiety) 16 (severe anxiety) 15 (severe anxiety)  17 (severe anxiety) 19 (severe anxiety)   Total Score 20 17    17    17    17    17 16 15    15 18 17 19    19     PROMIS 10-Global Health (all questions and answers displayed):       10/26/2022    12:50 PM 1/25/2023    12:48 PM 2/7/2023    12:57 PM 4/17/2023     2:12 PM   PROMIS 10   In general, would you say your health is: Fair Poor Poor Poor   In general, would you say your quality of life is: Poor Poor Poor Poor   In general, how would you rate your physical health? Poor Poor Poor Poor   In general, how would you rate your mental health, including your mood and your ability to think? Fair Fair Poor Fair   In general, how would you  rate your satisfaction with your social activities and relationships? Poor Fair Poor Fair   In general, please rate how well you carry out your usual social activities and roles Poor Fair Poor Fair   To what extent are you able to carry out your everyday physical activities such as walking, climbing stairs, carrying groceries, or moving a chair? A little A little A little A little   In the past 7 days, how often have you been bothered by emotional problems such as feeling anxious, depressed, or irritable? Always Always Always Always   In the past 7 days, how would you rate your fatigue on average? Moderate Severe Severe Moderate   In the past 7 days, how would you rate your pain on average, where 0 means no pain, and 10 means worst imaginable pain? 8 8 9 8   In general, would you say your health is: 2 1 1 1    1   In general, would you say your quality of life is: 1 1 1 1    1   In general, how would you rate your physical health? 1 1 1 1    1   In general, how would you rate your mental health, including your mood and your ability to think? 2 2 1 2    2   In general, how would you rate your satisfaction with your social activities and relationships? 1 2 1 2    2   In general, please rate how well you carry out your usual social activities and roles. (This includes activities at home, at work and in your community, and responsibilities as a parent, child, spouse, employee, friend, etc.) 1 2 1 2    2   To what extent are you able to carry out your everyday physical activities such as walking, climbing stairs, carrying groceries, or moving a chair? 2 2 2 2    2   In the past 7 days, how often have you been bothered by emotional problems such as feeling anxious, depressed, or irritable? 5 5 5 5    5   In the past 7 days, how would you rate your fatigue on average? 3 4 4 3    3   In the past 7 days, how would you rate your pain on average, where 0 means no pain, and 10 means worst imaginable pain? 8 8 9 8    8   Global  Mental Health Score 5 6 4 6    6   Global Physical Health Score 8 7 7 8    8   PROMIS TOTAL - SUBSCORES 13 13 11 14    14         ASSESSMENT: Current Emotional / Mental Status (status of significant symptoms):   Risk status (Self / Other harm or suicidal ideation)   Patient denies current fears or concerns for personal safety.   Patient denies current or recent suicidal ideation or behaviors.   Patient denies current or recent homicidal ideation or behaviors.   Patient denies current or recent self injurious behavior or ideation.   Patient denies other safety concerns.   Patient reports there has been no change in risk factors since their last session.     Patient reports there has been no change in protective factors since their last session.     Recommended that patient call 911 or go to the local ED should there be a change in any of these risk factors.     Appearance:   Appropriate    Eye Contact:   Good    Psychomotor Behavior: Normal    Attitude:   Cooperative    Orientation:   All   Speech    Rate / Production: Normal     Volume:  Normal    Mood:    Normal   Affect:    Appropriate    Thought Content:  Clear    Thought Form:  Coherent  Logical    Insight:    Good      Medication Review:   No changes to current psychiatric medication(s)     Medication Compliance:   Yes     Changes in Health Issues:   Yes. Had recent oral surgery  of 25 teeth removed.      Chemical Use Review:   Substance Use: Chemical use reviewed, no active concerns identified      Tobacco Use: No current tobacco use.      Diagnosis:  Generalized anxiety disorder       Collateral Reports Completed:   Routed note to PCP    PLAN: (Patient Tasks / Therapist Tasks / Other)  1.  Will be transitioning to another Therapist due to writer transferring to another Department.  2.  Stop thought processing techniques   3.  Patient will look into again applying for Social Security.  4.  Free flow writing continues to assist with       Swetha Weinberg  LICSW  6/12/23                                                         ______________________________________________________________________  ______________________________________________________________________  Individual Treatment Plan     Patient's Name: Radha Beckwith                    YOB: 1975     Date of Creation: 12/5/22  Date Treatment Plan Last Reviewed/Revised: 4/10/2023     DSM5 Diagnoses: Generalized anxiety disorder  Psychosocial / Contextual Factors: Current stressors with holiday season coming up and medical concerns.  PROMIS (reviewed every 90 days):      Referral / Collaboration:  Referral to another professional/service is not indicated at this time..     Anticipated number of session for this episode of care: 6-9 sessions  Anticipation frequency of session: Biweekly  Anticipated Duration of each session: 38-52 minutes  Treatment plan will be reviewed in 90 days or when goals have been changed.         MeasurableTreatment Goal(s) related to diagnosis / functional impairment(s)  MeasurableTreatment Goal(s) related to diagnosis / functional impairment(s)  Goal 1: Patient will focus on recognizing and managing symptoms of anxiety as evidenced by a BO-7 score of 5 or less    I will know I've met my goal when I do not have to call and reach out for extra support from the clinic.       Objective #A (Patient Action)                          Patient will practice deep breathing at least 3-5 a day.  Status: Continued Date 4/10/23     Objective #B  Patient will attend and participate in social or recreational activities At least 3 times per week.  Status: Continued Date 4/10/23        Objective #C  Patient will use at least 5 coping skills for anxiety management in the next 12 weeks.  Status: Continued Date 4/10/23              Objective #D                Patient will use thought-stopping strategy daily to reduce intrusive  thoughts.  Status:Continued Date 4/10/23           Objective #E  Patient will use distraction each time intrusive worry surfaces.                       Status: Continued Date 4/10/23           Objective #F  Patient will Improve quantity and quality of night time sleep / decrease daytime naps.  Status: Continued Date 4/10/23           Intervention(s)  Therapist will teach CBT skills to challenge cognitive distortions and core beliefs.  Therapist will teach and model positive self-talk behaviors.  Therapist will use psychodynamic approaches to explore early attachments and schemas.  Therapist will teach DBT mindfulness and emotion regulation skills.                Patient has reviewed and agreed to the above plan.   Continued Date 4/10/23        Swetha Weinberg [facility]        4/10/23           Answers for HPI/ROS submitted by the patient on 3/27/2023  If you checked off any problems, how difficult have these problems made it for you to do your work, take care of things at home, or get along with other people?: Extremely difficult  PHQ9 TOTAL SCORE: 16  BO 7 TOTAL SCORE: 16  Answers for HPI/ROS submitted by the patient on 5/10/2023  BO 7 TOTAL SCORE: 17        Answers for HPI/ROS submitted by the patient on 6/12/2023  If you checked off any problems, how difficult have these problems made it for you to do your work, take care of things at home, or get along with other people?: Extremely difficult  PHQ9 TOTAL SCORE: 20  BO 7 TOTAL SCORE: 19

## 2023-06-15 ENCOUNTER — THERAPY VISIT (OUTPATIENT)
Dept: PHYSICAL THERAPY | Facility: CLINIC | Age: 48
End: 2023-06-15
Payer: COMMERCIAL

## 2023-06-15 DIAGNOSIS — M54.50 CHRONIC MIDLINE LOW BACK PAIN WITHOUT SCIATICA: Primary | ICD-10-CM

## 2023-06-15 DIAGNOSIS — M54.2 CHRONIC NECK PAIN: ICD-10-CM

## 2023-06-15 DIAGNOSIS — G89.29 CHRONIC NECK PAIN: ICD-10-CM

## 2023-06-15 DIAGNOSIS — G89.29 CHRONIC MIDLINE LOW BACK PAIN WITHOUT SCIATICA: Primary | ICD-10-CM

## 2023-06-15 PROCEDURE — 97162 PT EVAL MOD COMPLEX 30 MIN: CPT | Mod: GP | Performed by: PHYSICAL THERAPIST

## 2023-06-15 PROCEDURE — 97112 NEUROMUSCULAR REEDUCATION: CPT | Mod: GP | Performed by: PHYSICAL THERAPIST

## 2023-06-15 NOTE — PROGRESS NOTES
PHYSICAL THERAPY EVALUATION  Type of Visit: Evaluation    See electronic medical record for Abuse and Falls Screening details.    Subjective      Presenting condition or subjective complaint: back and neck painBack and neck pain. [facility]  Had clothes in her hand, not looking at ground, went off feet onto back suddenly. There was a jelly like substance like ice pack gel and slipped on it. Since then, everything has fallen apart.  Limited to 5 minutes of vacuuming, gardening etc and crying standing against wall due to pain. Had injection and felt relief within one day. Symptoms returning but not as extreme. Feels she needs to gain more muscle in legs as very sore walking and standing too long, as well as burning, eg  burning 2 days after bowling.   Date of onset: 10/01/20 (Approximately  and has had chronic pain for years)    Relevant medical history: Cold or hot arm or leg; Depression; Hearing problems; Migraines or headaches; Menopause; Mental Illness; Overweight; Pain at night or rest; Significant weakness; Smoking; Vision problems Constipation, ovarian cysts  Dates & types of surgery: Hysterectomy, gall bladder out, all teeth removedPulled 25 teeth at one time and had to go to ED due to issues.     Prior diagnostic imaging/testing results: MRI; CT scan; X-ray   MRI OF THE LUMBAR SPINE WITHOUT CONTRAST 3/8/2022 2:44 PM      COMPARISON: Lumbar spine CT 9/28/2021.     HISTORY: Chronic midline low back pain without sciatica.     TECHNIQUE: Multiplanar, multisequence MRI images of the lumbar spine  were acquired without IV contrast.     FINDINGS: There are five lumbar-type vertebrae for the purposes of  this dictation.      There is normal alignment of the lumbar vertebrae. Vertebral body  heights of the lumbar spine are normal. Marrow signal throughout the  lumbar vertebrae is within normal limits. There is no evidence for  fracture or pathologic bony lesion of the lumbar spine.      There is loss of  disc height, disc desiccation and posterior disc  bulging at the L3-L4, L4-L5 and L5-S1 levels. The upper two lumbar  intervertebral discs remain normal in height and contour with no  abnormal signal.     The tip of the conus medullaris is at the L1-L2 level which is within  normal limits. There is no evidence for intrathecal abnormality.      Level by level:      T12-L1: Normal disc height and signal. No herniation. Normal facet  joints. No spinal canal stenosis. No foraminal stenosis on either  side.      L1-L2: Normal disc height and signal. No herniation. Normal facet  joints. No spinal canal stenosis. No foraminal stenosis on either  side.      L2-L3: Normal disc height and signal. No herniation. Mild facet  arthropathy on the left. No spinal canal stenosis. No foraminal  stenosis on either side.      L3-L4: Loss of disc height, disc desiccation and mild circumferential  disc bulging. No herniation. Moderate facet arthropathy bilaterally.  No spinal canal stenosis. No foraminal stenosis on either side.     L4-L5: Loss of disc height, disc desiccation and circumferential disc  bulging with a superimposed left lateral (foraminal zone) disc  herniation (protrusion). Mild facet arthropathy bilaterally. No spinal  canal stenosis. Mild left foraminal narrowing. No right foraminal  stenosis.     L5-S1: Loss of disc height, disc desiccation and circumferential disc  bulging with a superimposed left lateral (foraminal zone) disc  herniation (protrusion). Mild facet arthropathy bilaterally. No spinal  canal stenosis. Mild-moderate left foraminal narrowing. No right  foraminal stenosis. The herniated disc material may contact the  exiting left L5 nerve or in the left neural foramen possibly resulting  in irritation or impingement.   IMPRESSION:  1. Degenerative change of the lumbar spine as detailed above.  2. Left lateral disc herniations at the L4-L5 and L5-S1 levels. The  L5-S1 disc herniation may result in irritation  "or impingement upon the  exiting left L5 nerve root in the left neural foramen. No other  significant spinal canal or neural foraminal narrowing of the lumbar  spine.  Prior therapy history for the same diagnosis, illness or injury: Yes 1+ year ago here with Maryam    Prior Level of Function   Independent at home but limited in time able to be active.     Living Environment  Social support: Alone   Type of home: House   Stairs to enter the home: Yes 3     Ramp:     Stairs inside the home: Yes 14 (7 to main level and 7 to basement)     Help at home: None  Equipment owned: Bath bench     Employment: No    Hobbies/Interests: Gardening    Patient goals for therapy: Gardening, walking, easy chores    Pain assessment:     Low back symptoms: From L4-5 level to T10 (approx) and down to S2 bliateral, upper gluteal bilaterally, sometimes wraps around to front of pelvis, burning and tingling bilateral lateral mid thigh and used to shoot down to foot not sure where. Shooting pain \"very on and off\".   Shooting, burning, Lateral thigh pain - tingle and continues to feel this with hurt with light touch.   VAS: now:  6/10, range: 6/10 increases to 10/10.     Increase: lifting, reading, laundry, dusting, loading/unloading , anything with 1/2 bending, stairs, personal care, walking, standing, concentration, sitting, intercourse, social life, sleeping,  Traveling, gardening, laying in bed (wakes every 2 hours in pain  Decrease: heating pad, pillow on chair to soften around coccyx area, Nortriptyline helps thigh symptoms.   Morning: feels like crap - sleeps in recliner as she sleeps poorly and this continues through the day, evening symptoms based on what she did.     Objective   LUMBAR SPINE EVALUATION  ADDITIONAL HISTORY:  Present symptoms: L mid thoracic lateral trunk pain 7/10, into calf and feet (top and sides of feet) - burning numbness  Pain quality: tender  Paresthesia (yes/no):  Not now, feet get numb if sitting, " driving. Defines numbness as a burning numbness  Symptoms (improving/unchanging/worsening):  worse  Symptoms commenced as a result of: Fall at [facility] increased symptoms and twisted back at [employer] before fell at [facility] was trying to get oil off top shelf and twisted wrong. Feels her life right now is creating tension.   Condition occurred in the following environment:   Increased in [facility] with store    Symptoms at onset (back/thigh/leg): back thigh and leg  Constant symptoms (back/thigh/leg): back, thigh and leg    Disturbed sleep (yes/no):  Yes      Specific Questions:  Cough/Sneeze/Strain (pos/neg): sneezing positive (multiple times), coughing positive, straining positive     Postural Observation:   Sitting: Lordotic  Change of posture: better and then increases  Standing: Lordotic  Lateral Shift: Nil.     Could not elicit lower extremity DTRs    Test Movements:   During: produces, abolishes, increases, decreases, no effect, centralizing, peripheralizing   After: better, worse, no better, no worse, no effect, centralized, peripheralized    Symptomatic response Mechanical response    During testing After testing Effect - increased ROM, decreased ROM, or key functional test No Effect   Pretest symptoms standing: Increasing back thigh and leg symptoms after sittingactivity     Rep FIS Increases    Worse        Rep EIS Increases    Worse          Pretest symptoms lying: increased back, thigh and leg pain    During testing After testing Effect - increased ROM, decreased ROM, or key functional test No Effect   Rep BOO Increases    Worse        Rep EIL Increases    Worse        NOTE: increased low back pain with 6 reps pressups without change in thighs, calf, feet and no effect gluteal. BOO significantly increased feet pain and low back pain.     Provisional Classification: Inconclusive/Other - Chronic Pain Syndrome    Potential Drivers of Pain and/or Disability: Comorbidities, Cognitive-Emotional, Afraid of  falling (has not fallen), fear of pain increasing, out of work x 2 years and medication concnerns    Principle of Management:  Education:  Hypersensitive system   Equipment provided:  none  MYOTOMES:   DTR S:   CORD SIGNS:   DERMATOMES:   NEURAL TENSION:   FLEXIBILITY:   LUMBAR/HIP Special Tests:    PELVIS/SI SPECIAL TESTS:   FUNCTIONAL TESTS:   PALPATION:   SPINAL SEGMENTAL CONCLUSIONS:       Assessment & Plan   CLINICAL IMPRESSIONS   Medical Diagnosis: Pain of both sacroiliac joints (M53.3)  - Primary     Chronic neck pain (M54.2, G89.29)     Myofascial pain (M79.18)     Chronic bilateral thoracic back pain (M54.6, G89.29)     Chronic bilateral low back pain with bilateral sciatica (M54.42, M54.41, G89.29)    Treatment Diagnosis: Chronic pain - neck and low back   Impression/Assessment: 46 yo female with chronic back and neck pain recent extraction of all her teeth with complications and ED visit. Increasing symptoms throughout the session. Hypersensitivity. Overall was more localized and less affected with extension in lying. Skilled intervention: neck assessment, progression of low back assessment and activities to calm symptoms including pain neuroscience education, posture, positioning and functional activities, breathing exercises     Clinical Decision Making (Complexity):   Clinical Presentation: Evolving/Changing  Clinical Presentation Rationale: based on medical and personal factors listed in PT evaluation  Clinical Decision Making (Complexity): Moderate complexity    PLAN OF CARE  Treatment Interventions:  Interventions: Neuromuscular Re-education, Therapeutic Activity, Therapeutic Exercise    Long Term Goals     PT Goal 1  Goal Identifier: Calm symptoms  Goal Description: Use pain science concepts including pacing, posture, positioning and graded exposure to develop a list of tools that will calm symptoms allowing her to be active 10 minutes before symptoms escalate  Target Date: 07/28/23  PT Goal  2  Goal Identifier: Cervical assessment  Goal Description: Neck assessment completed to guide home program  Target Date:  (next session)      Frequency of Treatment: 1-2 times per week  Duration of Treatment: 8 weeks    Recommended Referrals to Other Professionals: recommend counselling by trauma/pain trained professional, smoking cessation assistance if interested  Education Assessment:   Learner/Method: Patient;Listening;Reading;Demonstration;Pictures/Video;No Barriers to Learning  Education Comments: sitting: break up with standing, walking, use of lumbar roll, Mature organism model intro, goals, plan of care    Risks and benefits of evaluation/treatment have been explained.   Patient/Family/caregiver agrees with Plan of Care.     Evaluation Time:     PT Eval, Moderate Complexity Minutes (29400): 40   Present: Not applicable    Signing Clinician: Meme Peacock, PT      [facility]                                                                                   OUTPATIENT PHYSICAL THERAPY      PLAN OF TREATMENT FOR OUTPATIENT REHABILITATION   Patient's Last Name, First Name, Radha Ruiz YOB: 1975   Provider's Name   [facility]   Medical Record No.  [id number]     Onset Date: 10/01/20 (Approximately  and has had chronic pain for years)  Start of Care Date: 06/15/23     Medical Diagnosis:  Pain of both sacroiliac joints (M53.3)  - Primary     Chronic neck pain (M54.2, G89.29)     Myofascial pain (M79.18)     Chronic bilateral thoracic back pain (M54.6, G89.29)     Chronic bilateral low back pain with bilateral sciatica (M54.42, M54.41, G89.29)      PT Treatment Diagnosis:  Chronic pain - neck and low back Plan of Treatment  Frequency/Duration: 1-2 times per week1-2 times per week/ 8 weeks8 weeks    Certification date from 06/15/ to 08/10/23  8-       See note for plan of treatment  details and functional goals     Meme Peacock, PT                         I CERTIFY THE NEED FOR THESE SERVICES FURNISHED UNDER        THIS PLAN OF TREATMENT AND WHILE UNDER MY CARE     (Physician attestation of this document indicates review and certification of the therapy plan).                  Referring Provider:  Jenny DENNIS CNP      Initial Assessment  See Epic Evaluation- Start of Care Date: 06/15/23

## 2023-06-16 DIAGNOSIS — F41.9 ANXIETY: ICD-10-CM

## 2023-06-16 DIAGNOSIS — G25.81 RESTLESS LEGS SYNDROME (RLS): ICD-10-CM

## 2023-06-17 DIAGNOSIS — K59.03 CONSTIPATION DUE TO OPIOID THERAPY: ICD-10-CM

## 2023-06-17 DIAGNOSIS — T40.2X5A CONSTIPATION DUE TO OPIOID THERAPY: ICD-10-CM

## 2023-06-19 ENCOUNTER — VIRTUAL VISIT (OUTPATIENT)
Dept: BEHAVIORAL HEALTH | Facility: CLINIC | Age: 48
End: 2023-06-19
Payer: COMMERCIAL

## 2023-06-19 DIAGNOSIS — F41.1 GENERALIZED ANXIETY DISORDER: Primary | ICD-10-CM

## 2023-06-19 PROCEDURE — 90834 PSYTX W PT 45 MINUTES: CPT | Mod: VID | Performed by: SOCIAL WORKER

## 2023-06-19 RX ORDER — POLYETHYLENE GLYCOL 3350 17 G/17G
17 POWDER, FOR SOLUTION ORAL DAILY
Qty: 578 G | Refills: 3 | Status: SHIPPED | OUTPATIENT
Start: 2023-06-19 | End: 2024-08-19

## 2023-06-19 RX ORDER — ROPINIROLE 1 MG/1
TABLET, FILM COATED ORAL
Qty: 90 TABLET | Refills: 0 | Status: SHIPPED | OUTPATIENT
Start: 2023-06-19 | End: 2023-07-26

## 2023-06-19 RX ORDER — ALPRAZOLAM 0.5 MG
TABLET ORAL
Qty: 60 TABLET | Refills: 0 | Status: SHIPPED | OUTPATIENT
Start: 2023-06-19 | End: 2023-07-17

## 2023-06-19 ASSESSMENT — PATIENT HEALTH QUESTIONNAIRE - PHQ9
10. IF YOU CHECKED OFF ANY PROBLEMS, HOW DIFFICULT HAVE THESE PROBLEMS MADE IT FOR YOU TO DO YOUR WORK, TAKE CARE OF THINGS AT HOME, OR GET ALONG WITH OTHER PEOPLE: EXTREMELY DIFFICULT
SUM OF ALL RESPONSES TO PHQ QUESTIONS 1-9: 19
SUM OF ALL RESPONSES TO PHQ QUESTIONS 1-9: 19

## 2023-06-19 NOTE — TELEPHONE ENCOUNTER
"Miralax  Prescription approved per [facility] Refill Protocol.    Requested Prescriptions   Pending Prescriptions Disp Refills     polyethylene glycol (MIRALAX) 17 GM/Dose powder 578 g 3     Sig: Take 17 g by mouth daily       Laxatives Protocol Passed - 6/17/2023  1:05 PM        Passed - Patient is age 6 or older        Passed - Recent (12 mo) or future (30 days) visit within the authorizing provider's specialty     Patient has had an office visit with the authorizing provider or a provider within the authorizing providers department within the previous 12 mos or has a future within next 30 days. See \"Patient Info\" tab in inbasket, or \"Choose Columns\" in Meds & Orders section of the refill encounter.              Passed - Medication is active on med list           Renea Gagnon RN    "

## 2023-06-19 NOTE — PROGRESS NOTES
[facility]                                     Progress Note    Patient Name: Radha Beckwith  Date: 6/19/23         Service Type: Individual      Session Start Time: 1300  Session End Time: 1352     Session Length: 52     Session #: 28    Attendees: Client    Service Modality:  Switched to phone due to pt's computer issues.                                    Location: Pt. was at home       Provider verified identity through the following two step process.  Patient provided:  Patient is known previously to provider    Telemedicine Visit: The patient's condition can be safely assessed and treated via synchronous audio and visual telemedicine encounter.      Reason for Telemedicine Visit: Patient has requested telehealth visit    Originating Site (Patient Location): Patient's home    Distant Site (Provider Location): Provider Remote Setting- Home Office    Consent:  The patient/guardian has verbally consented to: the potential risks and benefits of telemedicine (video visit) versus in person care; bill my insurance or make self-payment for services provided; and responsibility for payment of non-covered services.     Patient would like the video invitation sent by:  Send to e-mail at: [email or web address]    Mode of Communication:  moved to telephone due to pt's computer issues.  Distant Location (Provider):  Off-site    As the provider I attest to compliance with applicable laws and regulations related to telemedicine.    DATA  Interactive Complexity: No  Crisis: No        Progress Since Last Session (Related to Symptoms / Goals / Homework):   Symptoms: continued     Homework: Partially completed      Episode of Care Goals: Minimal progress - ACTION (Actively working towards change); Intervened by reinforcing change plan / affirming steps taken     Current / Ongoing Stressors and Concerns:   Currently, not feeling well. According to pt. She has a cold and strep throat. Has been in recliner  for the last three days.     Treatment Objective(s) Addressed in This Session:   During todays session reviewed the progress she has made since the beginning of her therapy with this provider.  Although, patient has experienced a number of health issues that have impacted her life she is trying to maintain a positive attitude throughout her challenges.  She continues to have some periods of word finding and connecting her thoughts although believes that if that that was important enough it will come back to her at some point.  She will be continuing therapy with Ricarda TOMPKINS.  Scheduling number was given to patient via [employer].       Intervention:   CBT, solution focused therapy              Time -line of her life       Assessments completed prior to visit:  The following assessments were completed by patient for this visit:  PHQ9:       1/1/2023    11:37 AM 2/7/2023    12:54 PM 2/14/2023    11:36 AM 3/27/2023     2:53 PM 4/17/2023    12:41 PM 6/12/2023    10:17 AM 6/19/2023    12:20 PM   PHQ-9 SCORE   PHQ-9 Total Score [facility] 18 (Moderately severe depression) 18 (Moderately severe depression) 16 (Moderately severe depression) 16 (Moderately severe depression) 18 (Moderately severe depression) 20 (Severe depression) 19 (Moderately severe depression)   PHQ-9 Total Score 18 18 16 16 18    18 20 19     GAD7:       1/1/2023    11:37 AM 2/14/2023    11:38 AM 3/25/2023     3:01 PM 4/17/2023    12:45 PM 4/26/2023     1:00 PM 5/10/2023    12:51 PM 6/12/2023    10:19 AM   BO-7 SCORE   Total Score 20 (severe anxiety) 17 (severe anxiety) 16 (severe anxiety) 15 (severe anxiety)  17 (severe anxiety) 19 (severe anxiety)   Total Score 20 17    17    17    17    17 16 15    15 18 17 19    19     PROMIS 10-Global Health (all questions and answers displayed):       10/26/2022    12:50 PM 1/25/2023    12:48 PM 2/7/2023    12:57 PM 4/17/2023     2:12 PM   PROMIS 10   In general, would you say your health is: Fair Poor Poor  Poor   In general, would you say your quality of life is: Poor Poor Poor Poor   In general, how would you rate your physical health? Poor Poor Poor Poor   In general, how would you rate your mental health, including your mood and your ability to think? Fair Fair Poor Fair   In general, how would you rate your satisfaction with your social activities and relationships? Poor Fair Poor Fair   In general, please rate how well you carry out your usual social activities and roles Poor Fair Poor Fair   To what extent are you able to carry out your everyday physical activities such as walking, climbing stairs, carrying groceries, or moving a chair? A little A little A little A little   In the past 7 days, how often have you been bothered by emotional problems such as feeling anxious, depressed, or irritable? Always Always Always Always   In the past 7 days, how would you rate your fatigue on average? Moderate Severe Severe Moderate   In the past 7 days, how would you rate your pain on average, where 0 means no pain, and 10 means worst imaginable pain? 8 8 9 8   In general, would you say your health is: 2 1 1 1    1   In general, would you say your quality of life is: 1 1 1 1    1   In general, how would you rate your physical health? 1 1 1 1    1   In general, how would you rate your mental health, including your mood and your ability to think? 2 2 1 2    2   In general, how would you rate your satisfaction with your social activities and relationships? 1 2 1 2    2   In general, please rate how well you carry out your usual social activities and roles. (This includes activities at home, at work and in your community, and responsibilities as a parent, child, spouse, employee, friend, etc.) 1 2 1 2    2   To what extent are you able to carry out your everyday physical activities such as walking, climbing stairs, carrying groceries, or moving a chair? 2 2 2 2    2   In the past 7 days, how often have you been bothered by  emotional problems such as feeling anxious, depressed, or irritable? 5 5 5 5    5   In the past 7 days, how would you rate your fatigue on average? 3 4 4 3    3   In the past 7 days, how would you rate your pain on average, where 0 means no pain, and 10 means worst imaginable pain? 8 8 9 8    8   Global Mental Health Score 5 6 4 6    6   Global Physical Health Score 8 7 7 8    8   PROMIS TOTAL - SUBSCORES 13 13 11 14    14         ASSESSMENT: Current Emotional / Mental Status (status of significant symptoms):   Risk status (Self / Other harm or suicidal ideation)   Patient denies current fears or concerns for personal safety.   Patient denies current or recent suicidal ideation or behaviors.   Patient denies current or recent homicidal ideation or behaviors.   Patient denies current or recent self injurious behavior or ideation.   Patient denies other safety concerns.   Patient reports there has been no change in risk factors since their last session.     Patient reports there has been no change in protective factors since their last session.     Recommended that patient call 911 or go to the local ED should there be a change in any of these risk factors.     Appearance:   Unable to assess via phone   Eye Contact:   Unable to assess via phone     Psychomotor Behavior: Unable to assess via phone     Attitude:   Cooperative    Orientation:   All   Speech    Rate / Production: Normal     Volume:  Normal    Mood:    Normal   Affect:    Appropriate    Thought Content:  Clear    Thought Form:  Coherent  Logical    Insight:    Good      Medication Review:   No changes to current psychiatric medication(s)     Medication Compliance:   Yes     Changes in Health Issues:   Currently, has a cold and strep throat according to pt.      Chemical Use Review:   Substance Use: Chemical use reviewed, no active concerns identified      Tobacco Use: No current tobacco use.      Diagnosis:  Generalized anxiety disorder    Collateral  Reports Completed:   Routed note to PCP    PLAN: (Patient Tasks / Therapist Tasks / Other)  1.  Will be transitioning to [facility] due to writer transferring to another Department.  2.  Stop thought processing techniques   3.  Patient will look into again applying for Social Security.  4.  Free flow writing continues to assist with expression of thoughts and feelings.           Swetha Weinberg [facility]  6/19/23                                                         ______________________________________________________________________  Patient's Name: Radha Beckwith                    YOB: 1975     Date of Creation: 12/5/22  Date Treatment Plan Last Reviewed/Revised: 4/10/2023     DSM5 Diagnoses: Generalized anxiety disorder  Psychosocial / Contextual Factors: Current stressors with holiday season coming up and medical concerns.  PROMIS (reviewed every 90 days):      Referral / Collaboration:  Referral to another professional/service is not indicated at this time..     Anticipated number of session for this episode of care: 6-9 sessions  Anticipation frequency of session: Biweekly  Anticipated Duration of each session: 38-52 minutes  Treatment plan will be reviewed in 90 days or when goals have been changed.         MeasurableTreatment Goal(s) related to diagnosis / functional impairment(s)  MeasurableTreatment Goal(s) related to diagnosis / functional impairment(s)  Goal 1: Patient will focus on recognizing and managing symptoms of anxiety as evidenced by a BO-7 score of 5 or less    I will know I've met my goal when I do not have to call and reach out for extra support from the clinic.       Objective #A (Patient Action)                          Patient will practice deep breathing at least 3-5 a day.  Status: Continued Date 4/10/23     Objective #B  Patient will attend and participate in social or recreational activities At least 3 times per  week.  Status: Continued Date 4/10/23        Objective #C  Patient will use at least 5 coping skills for anxiety management in the next 12 weeks.  Status: Continued Date 4/10/23              Objective #D                Patient will use thought-stopping strategy daily to reduce intrusive thoughts.  Status:Continued Date 4/10/23           Objective #E  Patient will use distraction each time intrusive worry surfaces.                       Status: Continued Date 4/10/23           Objective #F  Patient will Improve quantity and quality of night time sleep / decrease daytime naps.  Status: Continued Date 4/10/23           Intervention(s)  Therapist will teach CBT skills to challenge cognitive distortions and core beliefs.  Therapist will teach and model positive self-talk behaviors.  Therapist will use psychodynamic approaches to explore early attachments and schemas.  Therapist will teach DBT mindfulness and emotion regulation skills.                Patient has reviewed and agreed to the above plan.   Continued Date 4/10/23        Swetha Weinberg [facility]        4/10/23           Answers for HPI/ROS submitted by the patient on 3/27/2023  If you checked off any problems, how difficult have these problems made it for you to do your work, take care of things at home, or get along with other people?: Extremely difficult  PHQ9 TOTAL SCORE: 16  BO 7 TOTAL SCORE: 16  Answers for HPI/ROS submitted by the patient on 5/10/2023  BO 7 TOTAL SCORE: 17           Answers for HPI/ROS submitted by the patient on 6/12/2023  If you checked off any problems, how difficult have these problems made it for you to do your work, take care of things at home, or get along with other people?: Extremely difficult  PHQ9 TOTAL SCORE: 20  BO 7 TOTAL SCORE: 19  Answers for HPI/ROS submitted by the patient on 6/19/2023  If you checked off any problems, how difficult have these problems made it for you to do your work, take care of things at home, or get  along with other people?: Extremely difficult  PHQ9 TOTAL SCORE: 19  BO 7 TOTAL SCORE: 19

## 2023-06-20 ENCOUNTER — OFFICE VISIT (OUTPATIENT)
Dept: ORTHOPEDICS | Facility: CLINIC | Age: 48
End: 2023-06-20
Attending: ORTHOPAEDIC SURGERY
Payer: COMMERCIAL

## 2023-06-20 VITALS
WEIGHT: 173 LBS | RESPIRATION RATE: 18 BRPM | SYSTOLIC BLOOD PRESSURE: 160 MMHG | DIASTOLIC BLOOD PRESSURE: 100 MMHG | BODY MASS INDEX: 29.7 KG/M2 | HEART RATE: 95 BPM

## 2023-06-20 DIAGNOSIS — M65.4 DE QUERVAIN'S DISEASE (TENOSYNOVITIS): ICD-10-CM

## 2023-06-20 DIAGNOSIS — G56.02 LEFT CARPAL TUNNEL SYNDROME: ICD-10-CM

## 2023-06-20 DIAGNOSIS — G56.01 RIGHT CARPAL TUNNEL SYNDROME: Primary | ICD-10-CM

## 2023-06-20 PROCEDURE — 99214 OFFICE O/P EST MOD 30 MIN: CPT | Performed by: ORTHOPAEDIC SURGERY

## 2023-06-20 NOTE — PATIENT INSTRUCTIONS
EMG of the bilateral extremity - [facility]. [facility] will reach out to you to schedule in a few days. If you would like to schedule sooner, please call scheduling at the number listed below.     [address]  [place] [address]   [phone]

## 2023-06-20 NOTE — LETTER
6/20/2023         RE: Radha Beckwith  [id number] [facility] [address]        Dear Colleague,    Thank you for referring your patient, Radha Beckwith, to the [facility]. Please see a copy of my visit note below.    Radha Beckwith is a 47 year old female who is seen as self referral for complaint of numbness of bilateral hands in the palms and also dorsal fingers.  Small is not involved.  Also complains of significant pain around the thumbs and along radial side of forearm.  Pain is sharp and is achey and throbbing.    She has had symptoms for 6 years.    Small finger is not involved.  Prior treatment used: Wrist splint - yes-has used several.  NSAID: - no. Reports history of ulcers.  No injections.    Employment: not working. This is not work related.      PAST MEDICAL HISTORY:  Diabetes mellitus negative, hypothyroid negative.    EMG has not been performed.     Past Medical History:   Diagnosis Date     Abdominal pain, right lower quadrant 03/09/2008    Admit. Discharged 03/10/08     Anxiety attack 07/31/2015     Atypical chest pain 06/23/2015     De Quervain's disease (tenosynovitis)      Dehydration      Gastric ulcer 07/31/2015     GERD (gastroesophageal reflux disease) 07/28/2010    Takes omeprazole and metoclopramide      Ingrowing nail 01/09/2014     Migraines      Opioid dependence in remission (H) 08/02/2015     Other and unspecified ovarian cyst      Papanicolaou smear of cervix with low grade squamous intraepithelial lesion (LGSIL) 07/07/2017       Past Surgical History:   Procedure Laterality Date     BIOPSY CERVICAL, LOCAL EXCISION, SINGLE/MULTIPLE N/A 8/10/2017    Procedure: BIOPSY CERVICAL, LOCAL EXCISION, SINGLE/MULTIPLE;;  Surgeon: Michael Chandler MD;  Location: [facility] OR     COLONOSCOPY N/A 1/6/2023    Procedure: COLONOSCOPY;  Surgeon: Michael Dozier MD;  Location:  GI     COLPOSCOPY, BIOPSY, COMBINED N/A 8/10/2017    Procedure: COMBINED  COLPOSCOPY, BIOPSY;  Colposcopy with Cervical Biopsies and Endometrial Biopsy, Exam with Ultrasound;  Surgeon: Micahel Chandler MD;  Location: [facility] OR     ESOPHAGOSCOPY, GASTROSCOPY, DUODENOSCOPY (EGD), COMBINED N/A 4/17/2017    Procedure: COMBINED ESOPHAGOSCOPY, GASTROSCOPY, DUODENOSCOPY (EGD);  Surgeon: Ibrahima Esposito MD;  Location: [facility] GI     ESOPHAGOSCOPY, GASTROSCOPY, DUODENOSCOPY (EGD), COMBINED N/A 1/6/2023    Procedure: ESOPHAGOGASTRODUODENOSCOPY, WITH BIOPSY;  Surgeon: Michael Dozier MD;  Location: [facility] GI     EXAM UNDER ANESTHESIA PELVIC N/A 8/10/2017    Procedure: EXAM UNDER ANESTHESIA PELVIC;;  Surgeon: Michael Chandler MD;  Location: [facility] OR     HYSTERECTOMY       HYSTERECTOMY, PAP NO LONGER INDICATED       INJECT EPIDURAL LUMBAR Bilateral 7/1/2022    Procedure: Lumbar 5-Sacral 1 Transforaminal Epidural Steroid Injection with fluoroscopic guidance and contrast, bilateral;  Surgeon: Trevor Ivory MD;  Location: [facility] OR     LAPAROSCOPIC CHOLECYSTECTOMY N/A 2/2/2018    Procedure: LAPAROSCOPIC CHOLECYSTECTOMY;  Laparoscopic Cholecystectomy;  Surgeon: Tigre Lowry DO;  Location: [facility] OR     LAPAROSCOPIC HYSTERECTOMY TOTAL N/A 10/30/2017    Procedure: LAPAROSCOPIC HYSTERECTOMY TOTAL;  LAPAROSCOPIC HYSTERECTOMY TOTAL POSSIBLE SALPINGO-OOPHERECTOMY (BILATERAL);  Surgeon: Michael Chandler MD;  Location: [facility] OR     ZZHC UGI ENDOSCOPY, SIMPLE EXAM  01/07/08       Family History   Problem Relation Age of Onset     Depression Mother      Respiratory Mother      Chronic Obstructive Pulmonary Disease Mother      Cerebrovascular Disease Father         Brain anyeurism     Breast Cancer Cousin      Adrenal Disorder Other      Chronic Obstructive Pulmonary Disease Other        Social History     Socioeconomic History     Marital status: Single     Spouse name: Not on file     Number of children: Not on file     Years of education: Not on file     Highest education level: Not on file    Occupational History     Not on file   Tobacco Use     Smoking status: Some Days     Packs/day: 0.25     Years: 20.00     Pack years: 5.00     Types: Cigarettes     Smokeless tobacco: Never   Vaping Use     Vaping status: Some Days     Substances: Nicotine, CBD     Devices: Refillable tank     Passive vaping exposure: Yes   Substance and Sexual Activity     Alcohol use: Yes     Comment: occasional drinks; about 5-6 beers/week     Drug use: No     Sexual activity: Yes     Partners: Male     Birth control/protection: Female Surgical   Other Topics Concern     Parent/sibling w/ CABG, MI or angioplasty before 65F 55M? No   Social History Narrative     Not on file     Social Determinants of Health     Financial Resource Strain: Not on file   Food Insecurity: Not on file   Transportation Needs: Not on file   Physical Activity: Not on file   Stress: Not on file   Social Connections: Not on file   Intimate Partner Violence: Not on file   Housing Stability: Not on file       Current Outpatient Medications   Medication Sig Dispense Refill     acetaminophen (TYLENOL) 500 MG tablet Take 1,000 mg by mouth every 6 hours as needed for mild pain       ALPRAZolam (XANAX) 0.5 MG tablet TAKE 1 TABLET (0.5 MG) BY MOUTH TWO TIMES A DAY 60 tablet 0     amoxicillin (AMOXIL) 500 MG capsule Take 500 mg by mouth 3 times daily Oral surgery       baclofen (LIORESAL) 10 MG tablet Start 5 mg daily x 3 days, then increase to 5 mg twice daily x 1 week. If tolerating, may increase to 10 mg twice daily. (Patient taking differently: Take 10 mg by mouth 2 times daily) 60 tablet 1     bisacodyl (DULCOLAX) 5 MG EC tablet Take 1 tablet (5 mg) by mouth every other day Take every other day. If no bowel movement for 2 or more days, take 2 tablets of bisacodyl (10 mg) 30 tablet 3     buprenorphine HCl-naloxone HCl (SUBOXONE) 2-0.5 MG per film Place 0.5 Film under the tongue daily        cetirizine (ZYRTEC) 10 MG tablet TAKE ONE TABLET BY MOUTH EVERY  MORNING (Patient taking differently: Take 10 mg by mouth daily) 90 tablet 3     chlorhexidine (PERIDEX) 0.12 % solution Swish and spit 30 mLs in mouth 2 times daily       cyclobenzaprine (FLEXERIL) 5 MG tablet TAKE ONE TO TWO TABLETS BY MOUTH THREE TIMES A DAY AS NEEDED FOR MUSCLE SPASMS (Patient taking differently: Take 5-10 mg by mouth 3 times daily as needed for muscle spasms) 90 tablet 5     DULoxetine (CYMBALTA) 30 MG capsule Take 1 capsule (30 mg) by mouth 2 times daily 180 capsule 0     famotidine (PEPCID) 40 MG tablet Take 40 mg by mouth 2 times daily as needed for heartburn       lactulose (CHRONULAC) 10 GM/15ML solution Take 10 g by mouth every 6 hours as needed for constipation       ondansetron (ZOFRAN ODT) 4 MG ODT tab DISSOLVE ONE TABLET ON TONGUE EVERY SIX HOURS AS NEEDED FOR NAUSEA 20 tablet 1     pantoprazole (PROTONIX) 40 MG EC tablet Take 1 tablet (40 mg) by mouth 2 times daily (before meals) 60 tablet 1     polyethylene glycol (MIRALAX) 17 GM/Dose powder Take 17 g by mouth daily 578 g 3     QUEtiapine (SEROQUEL) 100 MG tablet Take 1.5 tablets (150 mg) by mouth At Bedtime for 14 days, THEN 1 tablet (100 mg) At Bedtime for 14 days, THEN 0.5 tablets (50 mg) At Bedtime for 14 days. (Patient taking differently: 0.5 tablets (50 mg) At Bedtime for 14 days.) 42 tablet 0     rOPINIRole (REQUIP) 1 MG tablet TAKE ONE TABLET BY MOUTH EVERY NIGHT AT BEDTIME 90 tablet 0     simvastatin (ZOCOR) 10 MG tablet TAKE ONE TABLET BY MOUTH EVERY NIGHT AT BEDTIME (Patient taking differently: Take 10 mg by mouth At Bedtime) 90 tablet 3     zolpidem (AMBIEN) 5 MG tablet TAKE ONE TABLET (5 MG) BY MOUTH NIGHTLY AS NEEDED FOR SLEEP 30 tablet 0       Allergies   Allergen Reactions     Ergotamine-Caffeine      12-            GI problems-     Seasonal Allergies      Sumatriptan      vomits after giving herself a shot     Compazine Anxiety     Droperidol Anxiety     Nubain [Nalbuphine Hcl] Anxiety     Prochlorperazine  Palpitations     Uncontrolled movement       REVIEW OF SYSTEMS:  CONSTITUTIONAL:  NEGATIVE for fever, chills, change in weight, not feeling tired  SKIN:  NEGATIVE for worrisome rashes, no skin lumps, no skin ulcers and no non-healing wounds  EYES:  NEGATIVE for vision changes or irritation.  ENT/MOUTH:  NEGATIVE.  No hearing loss, no hoarseness, no difficulty swallowing.  RESP:  NEGATIVE. No cough or shortness of breath.  BREAST:  NEGATIVE for masses, tenderness or discharge  CV:  NEGATIVE for chest pain, palpitations or peripheral edema  GI:  NEGATIVE for nausea, abdominal pain, heartburn, or change in bowel habits  :  Negative. No dysuria, no hematuria  MUSCULOSKELETAL:  See HPI above  NEURO:  NEGATIVE . No headaches, no dizziness,  no numbness  ENDOCRINE:  NEGATIVE for temperature intolerance, skin/hair changes  HEME/ALLERGY/IMMUNE:  NEGATIVE for bleeding problems  PSYCHIATRIC:  NEGATIVE. no anxiety, no depression.          O: She appears well,   Vitals: BP (!) 160/100   Pulse 95   Resp 18   Wt 78.5 kg (173 lb)   LMP  (LMP Unknown)   BMI 29.70 kg/m    BMI= Body mass index is 29.7 kg/m .   Cervical spine has full range of motion without pain.  Full range of motion of shoulder, elbows, wrists and fingers.  Hand exam revealed     Right: reduced sensation along median nerve distribution, + Phalen's maneuver, + weakness of thumb/pinky pincer grasp, negative Tinel's sign, no thenar atrophy, positive tenderness at first dorsal compartment tendons, but negative Finkelstein..  Left: reduced sensation along median nerve distribution, + Phalen's maneuver, + Tinel's sign, no thenar atrophy, no weakness of thumb/pinky pincer grasp, positive tenderness at first dorsal compartment tendons, but negative Finkelstein..   strength: Right decreased, Left normal.    A: Possible Bilateral carpal tunnel syndrome.    Possible bilateral deQuervain's tenosynovitis.    P: Explanation of median nerve entrapment is given.  The  treatment spectrum is discussed, including conservative treatment with night splint, NSAID, activity modification, and possible surgical release if the symptoms are persistent and severe.   Bilateral EMG ordered.  Return to clinic after EMG.        Again, thank you for allowing me to participate in the care of your patient.        Sincerely,        Yohan Gonsalves MD

## 2023-06-20 NOTE — PROGRESS NOTES
Radha Beckwith is a 47 year old female who is seen as self referral for complaint of numbness of bilateral hands in the palms and also dorsal fingers.  Small is not involved.  Also complains of significant pain around the thumbs and along radial side of forearm.  Pain is sharp and is achey and throbbing.    She has had symptoms for 6 years.    Small finger is not involved.  Prior treatment used: Wrist splint - yes-has used several.  NSAID: - no. Reports history of ulcers.  No injections.    Employment: not working. This is not work related.      PAST MEDICAL HISTORY:  Diabetes mellitus negative, hypothyroid negative.    EMG has not been performed.     Past Medical History:   Diagnosis Date     Abdominal pain, right lower quadrant 03/09/2008    Admit. Discharged 03/10/08     Anxiety attack 07/31/2015     Atypical chest pain 06/23/2015     De Quervain's disease (tenosynovitis)      Dehydration      Gastric ulcer 07/31/2015     GERD (gastroesophageal reflux disease) 07/28/2010    Takes omeprazole and metoclopramide      Ingrowing nail 01/09/2014     Migraines      Opioid dependence in remission (H) 08/02/2015     Other and unspecified ovarian cyst      Papanicolaou smear of cervix with low grade squamous intraepithelial lesion (LGSIL) 07/07/2017       Past Surgical History:   Procedure Laterality Date     BIOPSY CERVICAL, LOCAL EXCISION, SINGLE/MULTIPLE N/A 8/10/2017    Procedure: BIOPSY CERVICAL, LOCAL EXCISION, SINGLE/MULTIPLE;;  Surgeon: Michael Chandler MD;  Location: [facility]     COLONOSCOPY N/A 1/6/2023    Procedure: COLONOSCOPY;  Surgeon: Michael Dozier MD;  Location:  GI     COLPOSCOPY, BIOPSY, COMBINED N/A 8/10/2017    Procedure: COMBINED COLPOSCOPY, BIOPSY;  Colposcopy with Cervical Biopsies and Endometrial Biopsy, Exam with Ultrasound;  Surgeon: Michael Chandler MD;  Location: [facility]     ESOPHAGOSCOPY, GASTROSCOPY, DUODENOSCOPY (EGD), COMBINED N/A 4/17/2017    Procedure: COMBINED  ESOPHAGOSCOPY, GASTROSCOPY, DUODENOSCOPY (EGD);  Surgeon: Ibrahima Esposito MD;  Location: [facility]     ESOPHAGOSCOPY, GASTROSCOPY, DUODENOSCOPY (EGD), COMBINED N/A 1/6/2023    Procedure: ESOPHAGOGASTRODUODENOSCOPY, WITH BIOPSY;  Surgeon: Michael Dozier MD;  Location: [facility]     EXAM UNDER ANESTHESIA PELVIC N/A 8/10/2017    Procedure: EXAM UNDER ANESTHESIA PELVIC;;  Surgeon: Michael Chandler MD;  Location: [facility]     HYSTERECTOMY       HYSTERECTOMY, PAP NO LONGER INDICATED       INJECT EPIDURAL LUMBAR Bilateral 7/1/2022    Procedure: Lumbar 5-Sacral 1 Transforaminal Epidural Steroid Injection with fluoroscopic guidance and contrast, bilateral;  Surgeon: Trevor Ivory MD;  Location: [facility]     LAPAROSCOPIC CHOLECYSTECTOMY N/A 2/2/2018    Procedure: LAPAROSCOPIC CHOLECYSTECTOMY;  Laparoscopic Cholecystectomy;  Surgeon: Tigre Lowry DO;  Location: [facility]     LAPAROSCOPIC HYSTERECTOMY TOTAL N/A 10/30/2017    Procedure: LAPAROSCOPIC HYSTERECTOMY TOTAL;  LAPAROSCOPIC HYSTERECTOMY TOTAL POSSIBLE SALPINGO-OOPHERECTOMY (BILATERAL);  Surgeon: Michael Chandler MD;  Location: [facility]     [facility] UGI ENDOSCOPY, SIMPLE EXAM  01/07/08       Family History   Problem Relation Age of Onset     Depression Mother      Respiratory Mother      Chronic Obstructive Pulmonary Disease Mother      Cerebrovascular Disease Father         Brain anyeurism     Breast Cancer Cousin      Adrenal Disorder Other      Chronic Obstructive Pulmonary Disease Other        Social History     Socioeconomic History     Marital status: Single     Spouse name: Not on file     Number of children: Not on file     Years of education: Not on file     Highest education level: Not on file   Occupational History     Not on file   Tobacco Use     Smoking status: Some Days     Packs/day: 0.25     Years: 20.00     Pack years: 5.00     Types: Cigarettes     Smokeless tobacco: Never   Vaping Use     Vaping status: Some Days     Substances:  Nicotine, CBD     Devices: Refillable tank     Passive vaping exposure: Yes   Substance and Sexual Activity     Alcohol use: Yes     Comment: occasional drinks; about 5-6 beers/week     Drug use: No     Sexual activity: Yes     Partners: Male     Birth control/protection: Female Surgical   Other Topics Concern     Parent/sibling w/ CABG, MI or angioplasty before 65F 55M? No   Social History Narrative     Not on file     Social Determinants of Health     Financial Resource Strain: Not on file   Food Insecurity: Not on file   Transportation Needs: Not on file   Physical Activity: Not on file   Stress: Not on file   Social Connections: Not on file   Intimate Partner Violence: Not on file   Housing Stability: Not on file       Current Outpatient Medications   Medication Sig Dispense Refill     acetaminophen (TYLENOL) 500 MG tablet Take 1,000 mg by mouth every 6 hours as needed for mild pain       ALPRAZolam (XANAX) 0.5 MG tablet TAKE 1 TABLET (0.5 MG) BY MOUTH TWO TIMES A DAY 60 tablet 0     amoxicillin (AMOXIL) 500 MG capsule Take 500 mg by mouth 3 times daily Oral surgery       baclofen (LIORESAL) 10 MG tablet Start 5 mg daily x 3 days, then increase to 5 mg twice daily x 1 week. If tolerating, may increase to 10 mg twice daily. (Patient taking differently: Take 10 mg by mouth 2 times daily) 60 tablet 1     bisacodyl (DULCOLAX) 5 MG EC tablet Take 1 tablet (5 mg) by mouth every other day Take every other day. If no bowel movement for 2 or more days, take 2 tablets of bisacodyl (10 mg) 30 tablet 3     buprenorphine HCl-naloxone HCl (SUBOXONE) 2-0.5 MG per film Place 0.5 Film under the tongue daily        cetirizine (ZYRTEC) 10 MG tablet TAKE ONE TABLET BY MOUTH EVERY MORNING (Patient taking differently: Take 10 mg by mouth daily) 90 tablet 3     chlorhexidine (PERIDEX) 0.12 % solution Swish and spit 30 mLs in mouth 2 times daily       cyclobenzaprine (FLEXERIL) 5 MG tablet TAKE ONE TO TWO TABLETS BY MOUTH THREE TIMES A  DAY AS NEEDED FOR MUSCLE SPASMS (Patient taking differently: Take 5-10 mg by mouth 3 times daily as needed for muscle spasms) 90 tablet 5     DULoxetine (CYMBALTA) 30 MG capsule Take 1 capsule (30 mg) by mouth 2 times daily 180 capsule 0     famotidine (PEPCID) 40 MG tablet Take 40 mg by mouth 2 times daily as needed for heartburn       lactulose (CHRONULAC) 10 GM/15ML solution Take 10 g by mouth every 6 hours as needed for constipation       ondansetron (ZOFRAN ODT) 4 MG ODT tab DISSOLVE ONE TABLET ON TONGUE EVERY SIX HOURS AS NEEDED FOR NAUSEA 20 tablet 1     pantoprazole (PROTONIX) 40 MG EC tablet Take 1 tablet (40 mg) by mouth 2 times daily (before meals) 60 tablet 1     polyethylene glycol (MIRALAX) 17 GM/Dose powder Take 17 g by mouth daily 578 g 3     QUEtiapine (SEROQUEL) 100 MG tablet Take 1.5 tablets (150 mg) by mouth At Bedtime for 14 days, THEN 1 tablet (100 mg) At Bedtime for 14 days, THEN 0.5 tablets (50 mg) At Bedtime for 14 days. (Patient taking differently: 0.5 tablets (50 mg) At Bedtime for 14 days.) 42 tablet 0     rOPINIRole (REQUIP) 1 MG tablet TAKE ONE TABLET BY MOUTH EVERY NIGHT AT BEDTIME 90 tablet 0     simvastatin (ZOCOR) 10 MG tablet TAKE ONE TABLET BY MOUTH EVERY NIGHT AT BEDTIME (Patient taking differently: Take 10 mg by mouth At Bedtime) 90 tablet 3     zolpidem (AMBIEN) 5 MG tablet TAKE ONE TABLET (5 MG) BY MOUTH NIGHTLY AS NEEDED FOR SLEEP 30 tablet 0       Allergies   Allergen Reactions     Ergotamine-Caffeine      12-            GI problems-     Seasonal Allergies      Sumatriptan      vomits after giving herself a shot     Compazine Anxiety     Droperidol Anxiety     Nubain [Nalbuphine Hcl] Anxiety     Prochlorperazine Palpitations     Uncontrolled movement       REVIEW OF SYSTEMS:  CONSTITUTIONAL:  NEGATIVE for fever, chills, change in weight, not feeling tired  SKIN:  NEGATIVE for worrisome rashes, no skin lumps, no skin ulcers and no non-healing wounds  EYES:  NEGATIVE  for vision changes or irritation.  ENT/MOUTH:  NEGATIVE.  No hearing loss, no hoarseness, no difficulty swallowing.  RESP:  NEGATIVE. No cough or shortness of breath.  BREAST:  NEGATIVE for masses, tenderness or discharge  CV:  NEGATIVE for chest pain, palpitations or peripheral edema  GI:  NEGATIVE for nausea, abdominal pain, heartburn, or change in bowel habits  :  Negative. No dysuria, no hematuria  MUSCULOSKELETAL:  See HPI above  NEURO:  NEGATIVE . No headaches, no dizziness,  no numbness  ENDOCRINE:  NEGATIVE for temperature intolerance, skin/hair changes  HEME/ALLERGY/IMMUNE:  NEGATIVE for bleeding problems  PSYCHIATRIC:  NEGATIVE. no anxiety, no depression.          O: She appears well,   Vitals: BP (!) 160/100   Pulse 95   Resp 18   Wt 78.5 kg (173 lb)   LMP  (LMP Unknown)   BMI 29.70 kg/m    BMI= Body mass index is 29.7 kg/m .   Cervical spine has full range of motion without pain.  Full range of motion of shoulder, elbows, wrists and fingers.  Hand exam revealed     Right: reduced sensation along median nerve distribution, + Phalen's maneuver, + weakness of thumb/pinky pincer grasp, negative Tinel's sign, no thenar atrophy, positive tenderness at first dorsal compartment tendons, but negative Finkelstein..  Left: reduced sensation along median nerve distribution, + Phalen's maneuver, + Tinel's sign, no thenar atrophy, no weakness of thumb/pinky pincer grasp, positive tenderness at first dorsal compartment tendons, but negative Finkelstein..   strength: Right decreased, Left normal.    A: Possible Bilateral carpal tunnel syndrome.    Possible bilateral deQuervain's tenosynovitis.    P: Explanation of median nerve entrapment is given.  The treatment spectrum is discussed, including conservative treatment with night splint, NSAID, activity modification, and possible surgical release if the symptoms are persistent and severe.   Bilateral EMG ordered.  Return to clinic after EMG.

## 2023-06-28 ENCOUNTER — THERAPY VISIT (OUTPATIENT)
Dept: PHYSICAL THERAPY | Facility: CLINIC | Age: 48
End: 2023-06-28
Payer: COMMERCIAL

## 2023-06-28 ENCOUNTER — VIRTUAL VISIT (OUTPATIENT)
Dept: PSYCHOLOGY | Facility: CLINIC | Age: 48
End: 2023-06-28
Payer: COMMERCIAL

## 2023-06-28 DIAGNOSIS — M54.2 CHRONIC NECK PAIN: ICD-10-CM

## 2023-06-28 DIAGNOSIS — F41.9 ANXIETY: ICD-10-CM

## 2023-06-28 DIAGNOSIS — G89.29 CHRONIC MIDLINE LOW BACK PAIN WITHOUT SCIATICA: Primary | ICD-10-CM

## 2023-06-28 DIAGNOSIS — M54.50 CHRONIC MIDLINE LOW BACK PAIN WITHOUT SCIATICA: Primary | ICD-10-CM

## 2023-06-28 DIAGNOSIS — G89.29 CHRONIC NECK PAIN: ICD-10-CM

## 2023-06-28 DIAGNOSIS — F33.1 MODERATE EPISODE OF RECURRENT MAJOR DEPRESSIVE DISORDER (H): Primary | ICD-10-CM

## 2023-06-28 PROCEDURE — 92002 INTRM OPH EXAM NEW PATIENT: CPT | Mod: VID | Performed by: COUNSELOR

## 2023-06-28 PROCEDURE — 97161 PT EVAL LOW COMPLEX 20 MIN: CPT | Mod: GP | Performed by: PHYSICAL THERAPIST

## 2023-06-28 PROCEDURE — 97112 NEUROMUSCULAR REEDUCATION: CPT | Mod: GP | Performed by: PHYSICAL THERAPIST

## 2023-06-28 NOTE — PROGRESS NOTES
Answers for HPI/ROS submitted by the patient on 6/28/2023  If you checked off any problems, how difficult have these problems made it for you to do your work, take care of things at home, or get along with other people?: Extremely difficult  PHQ9 TOTAL SCORE: 19          [facility]                                     Progress Note    Patient Name: Radha Beckwith  Date: 6/28/23         Service Type: Individual      Session Start Time: 12:00pm  Session End Time: 1:00pm     Session Length: 60mins    Session #: 1    Attendees: Client    Service Modality:  Video Visit:      Provider verified identity through the following two step process.  Patient provided:  Patient is known previously to provider    Telemedicine Visit: The patient's condition can be safely assessed and treated via synchronous audio and visual telemedicine encounter.      Reason for Telemedicine Visit: Patient convenience (e.g. access to timely appointments / distance to available provider)    Originating Site (Patient Location): Patient's home    Distant Site (Provider Location): Provider Remote Setting- Home Office    Consent:  The patient/guardian has verbally consented to: the potential risks and benefits of telemedicine (video visit) versus in person care; bill my insurance or make self-payment for services provided; and responsibility for payment of non-covered services.     Patient would like the video invitation sent by:  My Chart    Mode of Communication:  Video Conference via Amwell    Distant Location (Provider):  Off-site    As the provider I attest to compliance with applicable laws and regulations related to telemedicine.    DATA  Interactive Complexity: No  Crisis: No        Progress Since Last Session (Related to Symptoms / Goals / Homework):   Symptoms: No change .    Homework: Completed in session      Episode of Care Goals: Minimal progress - ACTION (Actively working towards change); Intervened by reinforcing  change plan / affirming steps taken     Current / Ongoing Stressors and Concerns:   Client stated her son (Gume-Celia)  is in the service (for 7 years) and is stationed in [facility]. He went to [place] for 9 months. He's been to [place] and [place]. She stated he's home for a few weeks right now and when he's home it causes her anxiety to rise. She stated he's very opinionated and usually just says whatever he wants no regard if it hurts the other person. He just last year became a professional . The client stated her son when he was in [place] had a mild stroke from a seizure but is okay.     The client stated through the years her mind has gotten bad. She forgets words or can start saying something and forget what she was saying. This has been going on for a few years. She stated she went to neurologist and they didn't say anything. She thinks a lot of it has to do with menopause. The client stated her dad has brain aneurisms and had his first one at 28 and  at 55. She's had MRIs and such and all of that looked good. She has a notebook in almost every room to help her remember things.   Yan-boyfriend. They were high school sweethearts, went to prom together. Had dated when her son was younger on and off. He came to her mom's celebration of life in 2019. She moved out of the townhouse she lived in with her mom and moved to [place].   After her mom passed she fell into a depression. Now she's starting to realize how much of a controlling and difficult person she was. She stated she's starting to deal with this a lot now.   Client has been on Suboxone for over 15 years. She's working on getting of of it right now.   Has been out of work for 2 years due to back issues. Has had some issues with degenerative discs. Has had injections for the lower back which helped but it's now starting to wear off. Hearing aids, trifocals, teeth removed and getting dentures, carpale tunnel, neck issues- low range of  motion.        Treatment Objective(s) Addressed in This Session:   Increase interest, engagement, and pleasure in doing things  Improve quantity and quality of night time sleep / decrease daytime naps       Intervention:   Gathered information on background and built rapport with the client.     Assessments completed prior to visit:  The following assessments were completed by patient for this visit:  PHQ9:       2/7/2023    12:54 PM 2/14/2023    11:36 AM 3/27/2023     2:53 PM 4/17/2023    12:41 PM 6/12/2023    10:17 AM 6/19/2023    12:20 PM 6/28/2023    11:26 AM   PHQ-9 SCORE   PHQ-9 Total Score MyChart 18 (Moderately severe depression) 16 (Moderately severe depression) 16 (Moderately severe depression) 18 (Moderately severe depression) 20 (Severe depression) 19 (Moderately severe depression) 19 (Moderately severe depression)   PHQ-9 Total Score 18 16 16 18    18 20 19 19     GAD7:       1/1/2023    11:37 AM 2/14/2023    11:38 AM 3/25/2023     3:01 PM 4/17/2023    12:45 PM 4/26/2023     1:00 PM 5/10/2023    12:51 PM 6/12/2023    10:19 AM   BO-7 SCORE   Total Score 20 (severe anxiety) 17 (severe anxiety) 16 (severe anxiety) 15 (severe anxiety)  17 (severe anxiety) 19 (severe anxiety)   Total Score 20 17    17    17    17    17 16 15    15 18 17 19    19     PROMIS 10-Global Health (all questions and answers displayed):       10/26/2022    12:50 PM 1/25/2023    12:48 PM 2/7/2023    12:57 PM 4/17/2023     2:12 PM 6/25/2023     2:45 PM   PROMIS 10   In general, would you say your health is: Fair Poor Poor Poor Poor   In general, would you say your quality of life is: Poor Poor Poor Poor Fair   In general, how would you rate your physical health? Poor Poor Poor Poor Poor   In general, how would you rate your mental health, including your mood and your ability to think? Fair Fair Poor Fair Fair   In general, how would you rate your satisfaction with your social activities and relationships? Poor Fair Poor Fair Poor   In  general, please rate how well you carry out your usual social activities and roles Poor Fair Poor Fair Poor   To what extent are you able to carry out your everyday physical activities such as walking, climbing stairs, carrying groceries, or moving a chair? A little A little A little A little Moderately   In the past 7 days, how often have you been bothered by emotional problems such as feeling anxious, depressed, or irritable? Always Always Always Always Always   In the past 7 days, how would you rate your fatigue on average? Moderate Severe Severe Moderate Severe   In the past 7 days, how would you rate your pain on average, where 0 means no pain, and 10 means worst imaginable pain? 8 8 9 8 7   In general, would you say your health is: 2 1 1 1    1 1   In general, would you say your quality of life is: 1 1 1 1    1 2   In general, how would you rate your physical health? 1 1 1 1    1 1   In general, how would you rate your mental health, including your mood and your ability to think? 2 2 1 2    2 2   In general, how would you rate your satisfaction with your social activities and relationships? 1 2 1 2    2 1   In general, please rate how well you carry out your usual social activities and roles. (This includes activities at home, at work and in your community, and responsibilities as a parent, child, spouse, employee, friend, etc.) 1 2 1 2    2 1   To what extent are you able to carry out your everyday physical activities such as walking, climbing stairs, carrying groceries, or moving a chair? 2 2 2 2    2 3   In the past 7 days, how often have you been bothered by emotional problems such as feeling anxious, depressed, or irritable? 5 5 5 5    5 5   In the past 7 days, how would you rate your fatigue on average? 3 4 4 3    3 4   In the past 7 days, how would you rate your pain on average, where 0 means no pain, and 10 means worst imaginable pain? 8 8 9 8    8 7   Global Mental Health Score 5 6 4 6    6 6   Global  Physical Health Score 8 7 7 8    8 8   PROMIS TOTAL - SUBSCORES 13 13 11 14    14 14     Gardiner Suicide Severity Rating Scale (Lifetime/Recent)      10/4/2022    11:00 AM   G[place]iner Suicide Severity Rating (Lifetime/Recent)   Q1 Wished to be Dead (Past Month) no   Q2 Suicidal Thoughts (Past Month) no   Q3 Suicidal Thought Method no   Q4 Suicidal Intent without Specific Plan no   Q5 Suicide Intent with Specific Plan yes   Q6 Suicide Behavior (Lifetime) yes   Within the Past 3 Months? no   Level of Risk per Screen high risk         ASSESSMENT: Current Emotional / Mental Status (status of significant symptoms):   Risk status (Self / Other harm or suicidal ideation)   Patient denies current fears or concerns for personal safety.   Patient denies current or recent suicidal ideation or behaviors.   Patient denies current or recent homicidal ideation or behaviors.   Patient denies current or recent self injurious behavior or ideation.   Patient denies other safety concerns.   Patient reports there has been no change in risk factors since their last session.     Patient reports there has been no change in protective factors since their last session.     Recommended that patient call 911 or go to the local ED should there be a change in any of these risk factors.     Appearance:   Appropriate    Eye Contact:   Good    Psychomotor Behavior: Normal    Attitude:   Cooperative    Orientation:   All   Speech    Rate / Production: Normal/ Responsive Normal     Volume:  Normal    Mood:    Normal   Affect:    Appropriate    Thought Content:  Clear    Thought Form:  Coherent  Logical    Insight:    Good      Medication Review:   No changes to current psychiatric medication(s)     Medication Compliance:   Yes     Changes in Health Issues:   None reported     Chemical Use Review:   Substance Use: Chemical use reviewed, no active concerns identified      Tobacco Use: No change in amount of tobacco use since last session.  Patient  declined discussion at this time    Diagnosis:  1. Moderate episode of recurrent major depressive disorder (H)    2. Anxiety        Collateral Reports Completed:   Not Applicable    PLAN: (Patient Tasks / Therapist Tasks / Other)  Continue to work on stress reduction skills.         Ricarda Bhatia, [facility]                                                         ______________________________________________________________________    Individual Treatment Plan    Patient's Name: Radha Beckwith  YOB: 1975    Date of Creation: 6/28/23  Date Treatment Plan Last Reviewed/Revised: 6/28/23    DSM5 Diagnoses: 296.32 (F33.1) Major Depressive Disorder, Recurrent Episode, Moderate _  Psychosocial / Contextual Factors: living with boyfriend, memory issues  PROMIS (reviewed every 90 days):     Referral / Collaboration:  Referral to another professional/service is not indicated at this time..    Anticipated number of session for this episode of care: 9-12 sessions  Anticipation frequency of session: as needed  Anticipated Duration of each session: 38-52 minutes  Treatment plan will be reviewed in 90 days or when goals have been changed.       MeasurableTreatment Goal(s) related to diagnosis / functional impairment(s)  Goal 1: Patient will increase communication skills with family members.    Objective #A (Patient Action)    Patient will learn & utilize at least 2 assertive communication skills weekly.  Status: New - Date: 6/28/23     Intervention(s)  Therapist will teach emotional regulation skills. distress tolerance skills, interpersonal effectiveness skills, emotion regluation skills, mindfulness skills, radical acceptance. Therapist will teach client how to ID body cues for anxiety, anxiety reduction techniques, how to ID triggers for depression and anxiety- decrease reactivity/eliminate, lifestyle changes to reduce depression and anxiety, communication skills, explore cognitive beliefs and help client to  develop healthy cognitive patterns and beliefs.    Objective #B  Patient will use thought-stopping strategy daily to reduce intrusive thoughts.  Status: New - Date: 6/28/23     Intervention(s)  Therapist will teach emotional regulation skills. distress tolerance skills, interpersonal effectiveness skills, emotion regluation skills, mindfulness skills, radical acceptance. Therapist will teach client how to ID body cues for anxiety, anxiety reduction techniques, how to ID triggers for depression and anxiety- decrease reactivity/eliminate, lifestyle changes to reduce depression and anxiety, communication skills, explore cognitive beliefs and help client to develop healthy cognitive patterns and beliefs.      Goal 2: Patient will decrease depression symptoms.      Objective #A (Patient Action)    Status: New - Date: 6/28/23     Patient will Increase interest, engagement, and pleasure in doing things  Decrease frequency and intensity of feeling down, depressed, hopeless  Improve quantity and quality of night time sleep / decrease daytime naps  Feel less tired and more energy during the day   Improve diet, appetite, mindful eating, and / or meal planning  Identify negative self-talk and behaviors: challenge core beliefs, myths, and actions  Improve concentration, focus, and mindfulness in daily activities   Feel less fidgety, restless or slow in daily activities / interpersonal interactions.    Intervention(s)  Therapist will teach emotional regulation skills. distress tolerance skills, interpersonal effectiveness skills, emotion regluation skills, mindfulness skills, radical acceptance. Therapist will teach client how to ID body cues for anxiety, anxiety reduction techniques, how to ID triggers for depression and anxiety- decrease reactivity/eliminate, lifestyle changes to reduce depression and anxiety, communication skills, explore cognitive beliefs and help client to develop healthy cognitive patterns and  beliefs.    Objective #B  Patient will identify three distraction and diversion activities and use those activities to decrease level of anxiety  .    Status: New - Date: 6/28/23     Intervention(s)  Therapist will teach emotional regulation skills. distress tolerance skills, interpersonal effectiveness skills, emotion regluation skills, mindfulness skills, radical acceptance. Therapist will teach client how to ID body cues for anxiety, anxiety reduction techniques, how to ID triggers for depression and anxiety- decrease reactivity/eliminate, lifestyle changes to reduce depression and anxiety, communication skills, explore cognitive beliefs and help client to develop healthy cognitive patterns and beliefs.      Patient has reviewed and agreed to the above plan.      Ricarda Bhatia [facility]  June 28, 2023

## 2023-06-28 NOTE — PROGRESS NOTES
06/28/23 0500   Appointment Info   Signing clinician's name / credentials Meme Peacock, PT [facility]   Visits Used 2   Medical Diagnosis Pain of both sacroiliac joints (M53.3)  - Primary     Chronic neck pain (M54.2, G89.29)     Myofascial pain (M79.18)     Chronic bilateral thoracic back pain (M54.6, G89.29)     Chronic bilateral low back pain with bilateral sciatica (M54.42, M54.41, G89.29)   PT Tx Diagnosis Chronic pain - neck and low back   Precautions/Limitations Hypersensitive system   Other pertinent information Radha has been seen in PT in the past. Here for chronic pain specific concerns neck and back. Hypersensitivity. Just had 25 teeth pulled and is in pain from this. Opted to focus on low back this session and neck assessment next session.   Quick Adds Certification   Progress Note/Certification   Start of Care Date 06/15/23   Onset of illness/injury or Date of Surgery 10/01/20  (Approximately  and has had chronic pain for years)   Therapy Frequency 1-2 times per week   Predicted Duration 8 weeks   Certification date from 06/15/23   Certification date to 08/10/23   Progress Note Due Date 08/10/23       Present No   GOALS   PT Goals 2   PT Goal 1   Goal Identifier Calm symptoms   Goal Description Use pain science concepts including pacing, posture, positioning and graded exposure to develop a list of tools that will calm symptoms allowing her to be active 10 minutes before symptoms escalate   Target Date 07/28/23   PT Goal 2   Goal Identifier Cervical assessment   Goal Description Neck assessment completed to guide home program   Target Date   (next session)   PT Goal 3   Goal Identifier Relaxation   Goal Description Relax shoulders and neck so she can move easier for looking around environment and to be in healthful posture   Target Date 07/28/23   Subjective Report   Subjective Report She wants to work on relaxation. With stress shoulders and neck tighten. Symptoms:  "posterior neck with knot in center and to the base of the skull and a little above turning into a headache, to lateral neck when doing anything physical or head/neck flexed. Wrist pain really bad and expects CTS.Headaches - migraines and are all over. Neck: now: 6/10, 3/10 increasing to 10/10. Headache: now: 4/10, increasing 10/10. Description: neck is dull ache. Headache: dull ache. Increase: stress, reading newspaper, sleep wrong eg SB, lifting, bending, gardening, cooking eg looking down to peel potatos. increased with coughing, not sneezing. Swallowing: negative, deep breahting: negative, tinnitus: yes -\"hearing aides\" and weird ear canals contribute, nausea/vomiting negative but nausea all the time not sure why. Decreased: recliner positioning - pillow under glutes and feather type pillow behind back and blow up neck support, heat. Time of day: morning: worse, as day progresses day is better, evening based what she did during the day. Does not remember if there was an injury to her neck. Has headaches mainly back of head and migraine headaches.   Objective Measures   Objective Measures Objective Measure 1;Objective Measure 2;Objective Measure 3;Objective Measure 4   Objective Measure 1   Objective Measure ALBERT   Objective Measure 2   Objective Measure MICHELLE (9-5)   Objective Measure 3   Objective Measure NDI (10-30-30-60)   Objective Measure 4   Objective Measure Cervical tests   Details AROM: cervical flexion: 44 degrees with pain; extension: 55 degrees, pain; SB R: 21 degrees/L:18 degrees same pain/dull ache bilaterally; rotation: R:41 degrees/L: 35 degrees with pain bilaterally and lingers. Protraction: x 1: increase, x 10: worse; retraction: x 1 worse, x 10 no change  with 5% - 10%. DTRS biceps/triceps 1+/3, brachioradialis: 3/3 bilaterally.Supine: traction manually increased symptoms neck and into forearms. Tender and painful with palpation: upper traps, scalenes, pectoralis muscles, mid cervical spinous " processes and paraspinal muscles.   Treatment Interventions (PT)   Interventions Neuromuscular Re-education;Intervention (Other)   Neuromuscular Re-education   Neuromuscular re-ed of mvmt, balance, coord, kinesthetic sense, posture, proprioception minutes (25486) 14   Neuro Re-ed 2 Posture and positioning   Neuro Re-ed 2 - Details Cervical retraction in sitting or supine 1 x 10 reps x 5-10% x 10 reps every 2 hours. Support for arms when reading newspaper.   Skilled Intervention instruction, demonstration, assistance, activity selection   Patient Response/Progress able to complete cervical retraction with less symptoms.   Intervention (Other)   Other Intervention 1 reevaluation   Other Intervention 1 - Details See above - cervical assessment   Eval/Assessments   PT Eval, Low Complexity Minutes (12893) 30   Education   Learner/Method Patient;Listening;Reading;Demonstration;Pictures/Video;No Barriers to Learning   Education Comments cervical retraction. plan of care and goals   Plan   Home program sitting: break up with standing, walking, use of lumbar roll, Mature organism model intro, cervical retraction   Updates to plan of care 1-2 times per week   Plan for next session Review posture, positioning for calming, pain education - pacing   Comments   Comments Erick has had chronic pain and was assessed for her low back and today for her cervical spine. She hda increased symptoms with flexion activities and cervical protraction. She is tight into retraction and has tenderness throughout her neck, upper trap and pects. She is demonstrating an issue with hypersensitivity.   Total Session Time   Timed Code Treatment Minutes 14   Total Treatment Time (sum of timed and untimed services) 44

## 2023-07-05 ENCOUNTER — VIRTUAL VISIT (OUTPATIENT)
Dept: PSYCHOLOGY | Facility: CLINIC | Age: 48
End: 2023-07-05
Payer: COMMERCIAL

## 2023-07-05 ENCOUNTER — TELEPHONE (OUTPATIENT)
Dept: GASTROENTEROLOGY | Facility: CLINIC | Age: 48
End: 2023-07-05
Payer: COMMERCIAL

## 2023-07-05 DIAGNOSIS — F51.04 PSYCHOPHYSIOLOGICAL INSOMNIA: ICD-10-CM

## 2023-07-05 DIAGNOSIS — F41.1 GENERALIZED ANXIETY DISORDER: ICD-10-CM

## 2023-07-05 DIAGNOSIS — T40.2X5A CONSTIPATION DUE TO OPIOID THERAPY: Primary | ICD-10-CM

## 2023-07-05 DIAGNOSIS — F33.1 MODERATE EPISODE OF RECURRENT MAJOR DEPRESSIVE DISORDER (H): Primary | ICD-10-CM

## 2023-07-05 DIAGNOSIS — K59.03 CONSTIPATION DUE TO OPIOID THERAPY: Primary | ICD-10-CM

## 2023-07-05 PROCEDURE — 90834 PSYTX W PT 45 MINUTES: CPT | Mod: VID | Performed by: COUNSELOR

## 2023-07-05 RX ORDER — LACTULOSE 10 G/15ML
10 SOLUTION ORAL 3 TIMES DAILY PRN
Qty: 300 ML | Refills: 3 | Status: SHIPPED | OUTPATIENT
Start: 2023-07-05 | End: 2023-10-17

## 2023-07-05 NOTE — TELEPHONE ENCOUNTER
Madelaine Arredondo, JEANNIE CNP  You 3 minutes ago (11:03 AM)     TS  Prescription for lactulose with refills was sent to [facility] in [place].   Thank you,   Madelaine Arredondo, CNP

## 2023-07-05 NOTE — PROGRESS NOTES
Answers for HPI/ROS submitted by the patient on 6/28/2023  If you checked off any problems, how difficult have these problems made it for you to do your work, take care of things at home, or get along with other people?: Extremely difficult  PHQ9 TOTAL SCORE: 19          [facility]                                     Progress Note    Patient Name: Radha Beckwith  Date: 7/5/23         Service Type: Individual      Session Start Time: 2:00pm  Session End Time: 2:50pm     Session Length: 50    Session #: 2    Attendees: Client    Service Modality:  Video Visit:      Provider verified identity through the following two step process.  Patient provided:  Patient is known previously to provider    Telemedicine Visit: The patient's condition can be safely assessed and treated via synchronous audio and visual telemedicine encounter.      Reason for Telemedicine Visit: Patient convenience (e.g. access to timely appointments / distance to available provider)    Originating Site (Patient Location): Patient's home    Distant Site (Provider Location): Provider Remote Setting- Home Office    Consent:  The patient/guardian has verbally consented to: the potential risks and benefits of telemedicine (video visit) versus in person care; bill my insurance or make self-payment for services provided; and responsibility for payment of non-covered services.     Patient would like the video invitation sent by:  My Chart    Mode of Communication:  Video Conference via [employer]well    Distant Location (Provider):  Off-site    As the provider I attest to compliance with applicable laws and regulations related to telemedicine.    DATA  Interactive Complexity: No  Crisis: No        Progress Since Last Session (Related to Symptoms / Goals / Homework):   Symptoms: No change .    Homework: Completed in session      Episode of Care Goals: Minimal progress - ACTION (Actively working towards change); Intervened by reinforcing change  plan / affirming steps taken     Current / Ongoing Stressors and Concerns:   Client stated she slept the whole day yesterday.   She stated she had a horrible migraine this morning from the quick change in temperature.   Client got some notebooks so she can start journaling/keeping track of things she needs to remember.   Psychiatry is set for August.   Going to start taking fish oil.   Talked about PT update- sensitive to touch and also reflexes are off.      Treatment Objective(s) Addressed in This Session:   Increase interest, engagement, and pleasure in doing things  Feel less tired and more energy during the day        Intervention:   Talked about the various health issues the client is dealing with and how these affect her mental health. Reviewed some past trauma history and talked about her relationship with her son.     Assessments completed prior to visit:  The following assessments were completed by patient for this visit:  PHQ9:       2/7/2023    12:54 PM 2/14/2023    11:36 AM 3/27/2023     2:53 PM 4/17/2023    12:41 PM 6/12/2023    10:17 AM 6/19/2023    12:20 PM 6/28/2023    11:26 AM   PHQ-9 SCORE   PHQ-9 Total Score MyChart 18 (Moderately severe depression) 16 (Moderately severe depression) 16 (Moderately severe depression) 18 (Moderately severe depression) 20 (Severe depression) 19 (Moderately severe depression) 19 (Moderately severe depression)   PHQ-9 Total Score 18 16 16 18    18 20 19 19     GAD7:       1/1/2023    11:37 AM 2/14/2023    11:38 AM 3/25/2023     3:01 PM 4/17/2023    12:45 PM 4/26/2023     1:00 PM 5/10/2023    12:51 PM 6/12/2023    10:19 AM   BO-7 SCORE   Total Score 20 (severe anxiety) 17 (severe anxiety) 16 (severe anxiety) 15 (severe anxiety)  17 (severe anxiety) 19 (severe anxiety)   Total Score 20 17    17    17    17    17 16 15    15 18 17 19    19     PROMIS 10-Global Health (all questions and answers displayed):       10/26/2022    12:50 PM 1/25/2023    12:48 PM 2/7/2023     12:57 PM 4/17/2023     2:12 PM 6/25/2023     2:45 PM   PROMIS 10   In general, would you say your health is:    Poor Poor   In general, would you say your quality of life is:    Poor Fair   In general, how would you rate your physical health?    Poor Poor   In general, how would you rate your mental health, including your mood and your ability to think?    Fair Fair   In general, how would you rate your satisfaction with your social activities and relationships?    Fair Poor   In general, please rate how well you carry out your usual social activities and roles    Fair Poor   To what extent are you able to carry out your everyday physical activities such as walking, climbing stairs, carrying groceries, or moving a chair?    A little Moderately   In the past 7 days, how often have you been bothered by emotional problems such as feeling anxious, depressed, or irritable?    Always Always   In the past 7 days, how would you rate your fatigue on average?    Moderate Severe   In the past 7 days, how would you rate your pain on average, where 0 means no pain, and 10 means worst imaginable pain?    8 7   In general, would you say your health is:    1    1 1   In general, would you say your quality of life is:    1    1 2   In general, how would you rate your physical health?    1    1 1   In general, how would you rate your mental health, including your mood and your ability to think?    2    2 2   In general, how would you rate your satisfaction with your social activities and relationships?    2    2 1   In general, please rate how well you carry out your usual social activities and roles. (This includes activities at home, at work and in your community, and responsibilities as a parent, child, spouse, employee, friend, etc.)    2    2 1   To what extent are you able to carry out your everyday physical activities such as walking, climbing stairs, carrying groceries, or moving a chair?    2    2 3   In the past 7 days, how  often have you been bothered by emotional problems such as feeling anxious, depressed, or irritable?    5    5 5   In the past 7 days, how would you rate your fatigue on average?    3    3 4   In the past 7 days, how would you rate your pain on average, where 0 means no pain, and 10 means worst imaginable pain?    8    8 7   Global Mental Health Score    6    6 6   Global Physical Health Score    8    8 8   PROMIS TOTAL - SUBSCORES    14    14 14       Information is confidential and restricted. Go to Review Flowsheets to unlock data.    Multiple values from one day are sorted in reverse-chronological order     [place] Suicide Severity Rating Scale (Lifetime/Recent)      10/4/2022    11:00 AM   [place] Suicide Severity Rating (Lifetime/Recent)   Q1 Wished to be Dead (Past Month) no   Q2 Suicidal Thoughts (Past Month) no   Q3 Suicidal Thought Method no   Q4 Suicidal Intent without Specific Plan no   Q5 Suicide Intent with Specific Plan yes   Q6 Suicide Behavior (Lifetime) yes   Within the Past 3 Months? no   Level of Risk per Screen high risk         ASSESSMENT: Current Emotional / Mental Status (status of significant symptoms):   Risk status (Self / Other harm or suicidal ideation)   Patient denies current fears or concerns for personal safety.   Patient denies current or recent suicidal ideation or behaviors.   Patient denies current or recent homicidal ideation or behaviors.   Patient denies current or recent self injurious behavior or ideation.   Patient denies other safety concerns.   Patient reports there has been no change in risk factors since their last session.     Patient reports there has been no change in protective factors since their last session.     Recommended that patient call 911 or go to the local ED should there be a change in any of these risk factors.     Appearance:   Appropriate    Eye Contact:   Good    Psychomotor Behavior: Normal    Attitude:   Cooperative     Orientation:   All   Speech    Rate / Production: Normal/ Responsive Normal     Volume:  Normal    Mood:    Normal   Affect:    Appropriate    Thought Content:  Clear    Thought Form:  Coherent  Logical    Insight:    Good      Medication Review:   No changes to current psychiatric medication(s)     Medication Compliance:   Yes     Changes in Health Issues:   None reported     Chemical Use Review:   Substance Use: Chemical use reviewed, no active concerns identified      Tobacco Use: No change in amount of tobacco use since last session.  Patient declined discussion at this time    Diagnosis:  1. Moderate episode of recurrent major depressive disorder (H)    2. Generalized anxiety disorder        Collateral Reports Completed:   Not Applicable    PLAN: (Patient Tasks / Therapist Tasks / Other)  Continue to work on stress reduction skills.         Ricarda Bhatia, [facility]                                                         ______________________________________________________________________    Individual Treatment Plan    Patient's Name: Radha Beckwith  YOB: 1975    Date of Creation: 6/28/23  Date Treatment Plan Last Reviewed/Revised: 6/28/23    DSM5 Diagnoses: 296.32 (F33.1) Major Depressive Disorder, Recurrent Episode, Moderate _  Psychosocial / Contextual Factors: living with boyfriend, memory issues  PROMIS (reviewed every 90 days):     Referral / Collaboration:  Referral to another professional/service is not indicated at this time..    Anticipated number of session for this episode of care: 9-12 sessions  Anticipation frequency of session: as needed  Anticipated Duration of each session: 38-52 minutes  Treatment plan will be reviewed in 90 days or when goals have been changed.       MeasurableTreatment Goal(s) related to diagnosis / functional impairment(s)  Goal 1: Patient will increase communication skills with family members.    Objective #A (Patient Action)    Patient will learn &  utilize at least 2 assertive communication skills weekly.  Status: New - Date: 6/28/23     Intervention(s)  Therapist will teach emotional regulation skills. distress tolerance skills, interpersonal effectiveness skills, emotion regluation skills, mindfulness skills, radical acceptance. Therapist will teach client how to ID body cues for anxiety, anxiety reduction techniques, how to ID triggers for depression and anxiety- decrease reactivity/eliminate, lifestyle changes to reduce depression and anxiety, communication skills, explore cognitive beliefs and help client to develop healthy cognitive patterns and beliefs.    Objective #B  Patient will use thought-stopping strategy daily to reduce intrusive thoughts.  Status: New - Date: 6/28/23     Intervention(s)  Therapist will teach emotional regulation skills. distress tolerance skills, interpersonal effectiveness skills, emotion regluation skills, mindfulness skills, radical acceptance. Therapist will teach client how to ID body cues for anxiety, anxiety reduction techniques, how to ID triggers for depression and anxiety- decrease reactivity/eliminate, lifestyle changes to reduce depression and anxiety, communication skills, explore cognitive beliefs and help client to develop healthy cognitive patterns and beliefs.      Goal 2: Patient will decrease depression symptoms.      Objective #A (Patient Action)    Status: New - Date: 6/28/23     Patient will Increase interest, engagement, and pleasure in doing things  Decrease frequency and intensity of feeling down, depressed, hopeless  Improve quantity and quality of night time sleep / decrease daytime naps  Feel less tired and more energy during the day   Improve diet, appetite, mindful eating, and / or meal planning  Identify negative self-talk and behaviors: challenge core beliefs, myths, and actions  Improve concentration, focus, and mindfulness in daily activities   Feel less fidgety, restless or slow in daily  activities / interpersonal interactions.    Intervention(s)  Therapist will teach emotional regulation skills. distress tolerance skills, interpersonal effectiveness skills, emotion regluation skills, mindfulness skills, radical acceptance. Therapist will teach client how to ID body cues for anxiety, anxiety reduction techniques, how to ID triggers for depression and anxiety- decrease reactivity/eliminate, lifestyle changes to reduce depression and anxiety, communication skills, explore cognitive beliefs and help client to develop healthy cognitive patterns and beliefs.    Objective #B  Patient will identify three distraction and diversion activities and use those activities to decrease level of anxiety  .    Status: New - Date: 6/28/23     Intervention(s)  Therapist will teach emotional regulation skills. distress tolerance skills, interpersonal effectiveness skills, emotion regluation skills, mindfulness skills, radical acceptance. Therapist will teach client how to ID body cues for anxiety, anxiety reduction techniques, how to ID triggers for depression and anxiety- decrease reactivity/eliminate, lifestyle changes to reduce depression and anxiety, communication skills, explore cognitive beliefs and help client to develop healthy cognitive patterns and beliefs.      Patient has reviewed and agreed to the above plan.      Ricarda Bhatia [facility]  June 28, 2023

## 2023-07-05 NOTE — TELEPHONE ENCOUNTER
Spoke with patient today, following up on symptoms.  Reports her GERD symptoms have improved.  She is still limited with her diet as she did have dental work done in the past 6 weeks.  Bowels have slightly improved, but are still not great.  She is taking the bisacodyl however, reports a different doctor prescribed Lactulose.  She did need to use this yesterday after not moving her bowels in almost 7 days.  This was effective for her.  Patient is requesting that Madelaine prescribe as a GI provider so she doesn't have to have several doctors involved.

## 2023-07-06 RX ORDER — ZOLPIDEM TARTRATE 5 MG/1
TABLET ORAL
Qty: 30 TABLET | Refills: 0 | Status: SHIPPED | OUTPATIENT
Start: 2023-07-06 | End: 2023-08-07

## 2023-07-10 ENCOUNTER — THERAPY VISIT (OUTPATIENT)
Dept: PHYSICAL THERAPY | Facility: CLINIC | Age: 48
End: 2023-07-10
Payer: COMMERCIAL

## 2023-07-10 ENCOUNTER — NURSE TRIAGE (OUTPATIENT)
Dept: FAMILY MEDICINE | Facility: CLINIC | Age: 48
End: 2023-07-10

## 2023-07-10 DIAGNOSIS — G89.29 CHRONIC NECK PAIN: ICD-10-CM

## 2023-07-10 DIAGNOSIS — G89.29 CHRONIC MIDLINE LOW BACK PAIN WITHOUT SCIATICA: Primary | ICD-10-CM

## 2023-07-10 DIAGNOSIS — M54.50 CHRONIC MIDLINE LOW BACK PAIN WITHOUT SCIATICA: Primary | ICD-10-CM

## 2023-07-10 DIAGNOSIS — M54.2 CHRONIC NECK PAIN: ICD-10-CM

## 2023-07-10 PROCEDURE — 97112 NEUROMUSCULAR REEDUCATION: CPT | Mod: GP | Performed by: PHYSICAL THERAPIST

## 2023-07-10 PROCEDURE — 97140 MANUAL THERAPY 1/> REGIONS: CPT | Mod: GP | Performed by: PHYSICAL THERAPIST

## 2023-07-10 NOTE — TELEPHONE ENCOUNTER
"Patient gave the following information:    On 06/16-18 she got a bad cold - sore/red throat with spots; thought she had strep so she took an old antibiotic she had on hand.    06/28/23 her cold went to her chest for 3 days.  She wears hearing aides that are connected to her phone.  She can hear without her hearing aids when talking on the phone but has found that she is cannot hear and everything is monotone.    07/05/23 she went to bed at 1am, then again at 4am with a horrible migraine, 9/10; throbbing with heartbeat in her head.  07/10/23 woke up again today with another migraine, she is very nauseated; she took Zofran, Tylenol, and her muscle relaxer and was able to fall back asleep and has since woke up feeling better.    Patient has said she has been getting muscle spasms randomly, she has been cold/shivering even though she is wearing sweats, very nauseated, her left eye gets blurry throughout the day, she feels \"cloudy in the head,\" and overall feels unwell.    Patient is taking all her medications as prescribed.  She has been under a stress with her son being home while she has been sick.  Her son was home from the service from 06/25/23 to Friday 07/07/23.  She states that he is very hard to get along with which caused her a lot of anxiety.  She struggles to sleep.    She is seeing a therapist once a week, which is really helping her.  She has also restarted Melatonin 10mg at bedtime - this has been added to her medication list.    Patient is looking to have something on hand for her migraines at this time.  She will be sending in an Evisit requesting this.    Patient felt much better after call.  Spoke at length about taking medications at the same time every day, meditating before bed, and continuing therapy.    FYI    Reason for Disposition    Mild-moderate headache    Similar to previously diagnosed migraine headaches    Similar to previously diagnosed muscle-tension headaches    Additional " Information    Negative: Difficult to awaken or acting confused (e.g., disoriented, slurred speech)    Negative: Weakness of the face, arm or leg on one side of the body and new-onset    Negative: Numbness of the face, arm or leg on one side of the body and new-onset    Negative: Loss of speech or garbled speech and new-onset    Negative: Passed out (i.e., fainted, collapsed and was not responding)    Negative: Sounds like a life-threatening emergency to the triager    Negative: Followed a head injury within last 3 days    Negative: Traumatic Brain Injury (TBI) is suspected    Negative: Sinus pain of forehead and yellow or green nasal discharge    Negative: Pregnant    Negative: Unable to walk without falling    Negative: Stiff neck (can't touch chin to chest)    Negative: Possibility of carbon monoxide exposure    Negative: SEVERE headache, states 'worst headache' of life    Negative: SEVERE headache, sudden-onset (i.e., reaching maximum intensity within seconds to 1 hour)    Negative: Severe pain in one eye    Negative: Loss of vision or double vision  (Exception: Same as prior migraines.)    Negative: Patient sounds very sick or weak to the triager    Negative: Fever > 103 F (39.4 C)    Negative: Fever > 100.0 F (37.8 C) and has diabetes mellitus or a weak immune system (e.g., HIV positive, cancer chemotherapy, organ transplant, splenectomy, chronic steroids)    Negative: SEVERE headache (e.g., excruciating) and has had severe headaches before    Negative: SEVERE headache and not relieved by pain meds    Negative: SEVERE headache and vomiting    Negative: SEVERE headache and fever    Negative: New headache and weak immune system (e.g., HIV positive, cancer chemo, splenectomy, organ transplant, chronic steroids)    Negative: Fever present > 3 days (72 hours)    Negative: Patient wants to be seen    Negative: Unexplained headache that is present > 24 hours    Negative: New headache and age > 50    Negative:  Headache started during exertion (e.g., sex, strenuous exercise, heavy lifting)    Negative: Headache is a chronic symptom (recurrent or ongoing AND lasting > 4 weeks)    Protocols used: HEADACHE-A-[place]

## 2023-07-13 ENCOUNTER — VIRTUAL VISIT (OUTPATIENT)
Dept: PALLIATIVE MEDICINE | Facility: CLINIC | Age: 48
End: 2023-07-13
Payer: COMMERCIAL

## 2023-07-13 DIAGNOSIS — G89.29 CHRONIC BILATERAL THORACIC BACK PAIN: ICD-10-CM

## 2023-07-13 DIAGNOSIS — M54.6 CHRONIC BILATERAL THORACIC BACK PAIN: ICD-10-CM

## 2023-07-13 DIAGNOSIS — M54.2 CHRONIC NECK PAIN: Primary | ICD-10-CM

## 2023-07-13 DIAGNOSIS — M54.42 CHRONIC BILATERAL LOW BACK PAIN WITH BILATERAL SCIATICA: ICD-10-CM

## 2023-07-13 DIAGNOSIS — M53.3 PAIN OF BOTH SACROILIAC JOINTS: ICD-10-CM

## 2023-07-13 DIAGNOSIS — G89.29 CHRONIC NECK PAIN: Primary | ICD-10-CM

## 2023-07-13 DIAGNOSIS — M54.2 CHRONIC NECK PAIN: ICD-10-CM

## 2023-07-13 DIAGNOSIS — G89.29 CHRONIC NECK PAIN: ICD-10-CM

## 2023-07-13 DIAGNOSIS — M54.41 CHRONIC BILATERAL LOW BACK PAIN WITH BILATERAL SCIATICA: ICD-10-CM

## 2023-07-13 DIAGNOSIS — M79.18 MYOFASCIAL PAIN: ICD-10-CM

## 2023-07-13 DIAGNOSIS — G89.29 CHRONIC BILATERAL LOW BACK PAIN WITH BILATERAL SCIATICA: ICD-10-CM

## 2023-07-13 PROCEDURE — 99214 OFFICE O/P EST MOD 30 MIN: CPT | Mod: VID

## 2023-07-13 RX ORDER — BACLOFEN 20 MG/1
20 TABLET ORAL 2 TIMES DAILY
Qty: 60 TABLET | Refills: 1 | Status: SHIPPED | OUTPATIENT
Start: 2023-07-13 | End: 2023-09-01

## 2023-07-13 RX ORDER — CYCLOBENZAPRINE HCL 5 MG
TABLET ORAL
Qty: 90 TABLET | Refills: 5 | Status: SHIPPED | OUTPATIENT
Start: 2023-07-13 | End: 2023-10-25

## 2023-07-13 ASSESSMENT — PAIN SCALES - GENERAL: PAINLEVEL: SEVERE PAIN (7)

## 2023-07-13 NOTE — PROGRESS NOTES
Video-Visit Details    Type of service:  Video Visit     Originating Location (pt. Location): Home    Distant Location (provider location):  On-site  Platform used for Video Visit: [facility]     Date of visit: 7/13/2023      Assessment:   Radha Beckwith is a 47 year old female with a past medical history significant for chronic bilateral low back pain, depression, SVT, opioid dependence in remission, anxiety, tobacco use, GERD, restless legs who presents with complaints of neck pain, mid and low back.      1. Low back pain - Onset of pain within last 2 years. Pain is mostly axial, though she does have intermittent radicular leg symptoms, occasionally extends below the knee. On exam, positive facet loading and positive KATHY, sacral thrust and Yeoman's bilaterally. Positive myofascial TTP, negative SLR and neuro exam WNL. Etiology is likely associated with multiple factors, including underlying degenerative changes of lumbar spine, facet and SIJ mediated components, with prominent overlying myofascial component.   2. Neck/thoracic back pain - Onset of pain within last 2 years. On exam, positive for myofascial TTP, most notably in lower thoracic paraspinals. Etiology is likely associated with multiple factors, including underlying degenerative changes, with prominent overlying myofascial component.       Visit Diagnoses:  1. Chronic neck pain    2. Myofascial pain    3. Chronic bilateral thoracic back pain    4. Chronic bilateral low back pain with bilateral sciatica    5. Pain of both sacroiliac joints        Plan:  Diagnosis reviewed, treatment option addressed, and risk/benifits discussed.  Self-care instructions given.  I am recommending a multidisciplinary treatment plan to help this patient better manage their pain.      Pain Physical Therapy:  YES   She is working with [facility], though she notes due to hypersensitivity to touch, therapy has been somewhat limited.   She notes she is doing recommended stretches at home, no substantial improvement yet.  Recommend continue working with pain PT, will reassess for benefit at follow-up.     Pain Psychologist to address relaxation, behavioral change, coping style, and other factors important to improvement.  NO     Diagnostic Studies:  Reviewed lumbar MRI from 2022 - multilevel degenerative changes.      Medication Management:     Increase baclofen to 20 mg twice daily.  She reports small improvement after starting baclofen at last visit.  Though she continues to have significant neck and shoulder myofascial tightness.  Advised we will trial increase to 20 mg twice daily, then if no change in benefit, we will plan to taper back down and possibly off medication, may consider alternatives at this point.  No side effects.  Updated prescription sent into pharmacy today.    Change flexeril - continue 5-10 mg twice daily as needed. She takes in morning and afternoon, appreciates benefit for significant myofascial tightness.     Allow 4-6 hours in between all muscle relaxant doses, avoid concurrent alcohol use.      Potential procedures:     She had bilateral SI joint injections with Dr. Reynaga on 5/16/2023.  She reports significant benefit for a month or so, then noticed pain starting to return, though she thinks she still appreciates some benefit, notes somewhat lower intensity of low back pain.      Other Orders/Referrals:     Recommend using TENS unit a few times throughout the day as needed, particularly when engaging in activities that may increase pain.  She reports using a little bit more since last visit, but notes she knows she could be using more.  Advised to take to pain PT for assistance with device as needed.     Follow up with JEANNIE Schroeder CNP in 8 weeks or sooner if needed.       Review of Electronic Chart: Today I have also reviewed available medical information in the patient's medical record at [facility]  ([facility]) and Care Everywhere (if available), including relevant provider notes, laboratory work, and imaging.     Jenny Landrum DNP, APRN, [facility]     -------------------------------------------------    Subjective:    Chief complaint:   Chief Complaint   Patient presents with     Pain       Interval history:  Radha Beckwith is a 47 year old female last seen on 4/26/23.  They are a patient of mine seen in follow up.     Recommendations/plan at the last visit included:  Pain Physical Therapy:  YES   I am referring for targeting stretching, strengthening, and home exercise plan to support functional goals/ADLs.      Pain Psychologist to address relaxation, behavioral change, coping style, and other factors important to improvement.  NO     Diagnostic Studies:  Reviewed lumbar MRI from 2022 - multilevel degenerative changes.      Medication Management:     Start baclofen 5 mg daily x 3 days, then increase to 5 mg twice daily. If tolerating, may increase to 10 mg twice daily. Monitor for sedation and dizziness/drowsiness, be careful driving until you know effects of medication. Side effects should improve within 1-2 weeks.     Change flexeril - continue 5-10 mg at bedtime as needed.     Allow 4-6 hours in between all muscle relaxant doses, avoid concurrent alcohol use.      Potential procedures:     I am recommending bilateral SI joint injections with my colleague, Dr. Reynaga. She will be contacted to schedule. Positive bilateral KATHY, sacral thrust, and Yeoman's on exam suggestive of SI joint mediated pain.      Other Orders/Referrals:     Recommend using TENS unit a few times throughout the day as needed, particularly when engaging in activities that may increase pain.      Follow up with JEANNIE Schroeder CNP in 2-4 weeks after SI joint injection, or sooner if needed.       Since her last visit, Radha Beckwith reports:  -her pain is about the same as it was at last visit in  "neck, low back is improved since SI joint to an extent.   -She was referred to pain PT at last visit, has been seeing [facility].   -She states she is \"extremely sensitive,\" so touching is limited during visits.   -She is doing some neck stretching.   -She has not appreciated profound difference from pain PT yet.   -She had SI joint injection with Dr. Reynaga on 5/16/23.  -She reports substantial improvement in pain for about a month, then started to have some pain symptoms return.   -She continued to appreciate some benefit from injection for a while longer, less intensity of pain.   -She notes she had migraines x 2 weeks, which she wonders if is attributed to stress of having her son home.   -She also had teeth removal.   -She is taking baclofen 10 mg BID  -She has not appreciated much improvement in neck and low back myofascial pain.   -She continues to take flexeril as well, notes this is helpful when she is very stiff in morning.   -She is open to baclofen increase.   -She understands she needs to space out muscle relaxants doses by 4 hours.   -She is using TENS unit a little bit, will be taking to pain PT.       Pain Information:   Pain rating: averages 7/10 on a 0-10 scale.        HPI from initial visit with me on 4/26/23:  Radha Bekcwith is a 47 year old female presents with a chief complaint of neck pain/upper back and radicular low back pain.      The pain has been present for 1.5-2 years current pain episode.    The pain is Severe Pain (6) in severity.    The pain is described as dull ache in mid back, cramping, sharp, pressure, throbbing, burning in low back, radicular pain into hips, legs, butt, stops above knee.   The pain is alleviated by position changes, massage/heat device, exercise.    It is exacerbated by prolonged sitting, pain is constant but fluctuates with activity, prolonged standing and walking.    Modalities that have been utilized in the past which were helpful include PT.  "   Things that were not helpful, but tried ,include back brace, LESI x 1, TPI (?), TENS unit (OTC from [employer]).    The patient has never tried pain PT, SI joint/facet.  The patient otherwise denies bowel or bladder incontinence, parasthesias, weakness, saddle anesthesia, unintentional weight loss, or fever/chills/sweats.   Radha Beckwith has been seen at a pain clinic in the past.  She sees addiction med in [place], [facility] with Dr. Ivory.   -She is not currently working due to pain, off work for 1.5 years.   -She looked into disability but was advised by  that they would not take on her case.   -She had L5-S1 JASE 7/2022 with Dr. Ivory in [place].   -She saw neurosurgery in the past as well in 2021.   -She saw pain provider through [facility], did not care for provider.   -She has 1 kid, 25 year old boy, [employer] and .         Current Pain Treatments:    1. Medications:               Suboxone 2-0.5 mg daily (weaning off)              Duloxetine 30 mg BID              Ropinirole 1 mg at bedtime               Cyclobenzaprine 5-10 mg BID PRN               Baclofen 20 mg BID     2. Other therapies:               TENS              Pain PT      Current MME: N/A    Review of [place] Prescription Monitoring Program (): No concern for abuse or misuse of controlled medications based on this report. Reviewed - appears appropriate.     Past pain treatments:  SI joint injection 5/16/23 -significant benefit x1 month, then diminishing efficacy      Medications:  Current Outpatient Medications   Medication Sig Dispense Refill     acetaminophen (TYLENOL) 500 MG tablet Take 1,000 mg by mouth every 6 hours as needed for mild pain       ALPRAZolam (XANAX) 0.5 MG tablet TAKE 1 TABLET (0.5 MG) BY MOUTH TWO TIMES A DAY 60 tablet 0     baclofen (LIORESAL) 20 MG tablet Take 1 tablet (20 mg) by mouth 2 times daily 60 tablet 1     bisacodyl (DULCOLAX) 5 MG EC tablet Take 1 tablet (5 mg) by mouth every other  day Take every other day. If no bowel movement for 2 or more days, take 2 tablets of bisacodyl (10 mg) 30 tablet 3     buprenorphine HCl-naloxone HCl (SUBOXONE) 2-0.5 MG per film Place 0.5 Film under the tongue daily        cetirizine (ZYRTEC) 10 MG tablet TAKE ONE TABLET BY MOUTH EVERY MORNING (Patient taking differently: Take 10 mg by mouth daily) 90 tablet 3     chlorhexidine (PERIDEX) 0.12 % solution Swish and spit 30 mLs in mouth 2 times daily       cyclobenzaprine (FLEXERIL) 5 MG tablet TAKE ONE TO TWO TABLETS BY MOUTH THREE TIMES A DAY AS NEEDED FOR MUSCLE SPASMS 90 tablet 5     DULoxetine (CYMBALTA) 30 MG capsule Take 1 capsule (30 mg) by mouth 2 times daily 180 capsule 0     famotidine (PEPCID) 40 MG tablet Take 40 mg by mouth 2 times daily as needed for heartburn       lactulose (CHRONULAC) 10 GM/15ML solution Take 15 mLs (10 g) by mouth 3 times daily as needed for constipation (as needed for severe constipaiton, no BM for more than 3 days and feeling constipated) 300 mL 3     ondansetron (ZOFRAN ODT) 4 MG ODT tab DISSOLVE ONE TABLET ON TONGUE EVERY SIX HOURS AS NEEDED FOR NAUSEA 20 tablet 1     pantoprazole (PROTONIX) 40 MG EC tablet Take 1 tablet (40 mg) by mouth 2 times daily (before meals) 60 tablet 1     polyethylene glycol (MIRALAX) 17 GM/Dose powder Take 17 g by mouth daily 578 g 3     rOPINIRole (REQUIP) 1 MG tablet TAKE ONE TABLET BY MOUTH EVERY NIGHT AT BEDTIME 90 tablet 0     simvastatin (ZOCOR) 10 MG tablet TAKE ONE TABLET BY MOUTH EVERY NIGHT AT BEDTIME (Patient taking differently: Take 10 mg by mouth At Bedtime) 90 tablet 3     zolpidem (AMBIEN) 5 MG tablet TAKE ONE TABLET (5 MG) BY MOUTH NIGHTLY AS NEEDED FOR SLEEP 30 tablet 0       Medical History: any changes in medical history since they were last seen? No      Objective:    Physical Exam:  not currently breastfeeding.  GENERAL: Healthy, alert and no distress  EYES: Eyes grossly normal to inspection.  No discharge or erythema, or obvious  scleral/conjunctival abnormalities.  RESP: No audible wheeze, cough, or visible cyanosis.  No visible retractions or increased work of breathing.    SKIN: Visible skin clear. No significant rash, abnormal pigmentation or lesions.  NEURO: Cranial nerves grossly intact.  Mentation and speech appropriate for age.  PSYCH: Mentation appears normal, affect normal/bright, judgement and insight intact, normal speech and appearance well-groomed.    Diagnostic Tests/Imaging/Labs:  None     BILLING TIME DOCUMENTATION:   The total TIME spent on this patient on the date of the encounter/appointment was 32 minutes.      TOTAL TIME includes:   Time spent preparing to see the patient (reviewing records and tests)   Time spent face to face (or over the phone) with the patient   Time spent ordering tests, medications, procedures and referrals   Time spent Referring and communicating with other healthcare professionals   Time spent documenting clinical information in Epic

## 2023-07-13 NOTE — PROGRESS NOTES
Linette is a 47 year old who is being evaluated via a billable video visit.      How would you like to obtain your AVS? MyChart  If the video visit is dropped, the invitation should be resent by: Send to e-mail at: [email or web address]  Will anyone else be joining your video visit? No   Is Pt currently in [place]? Yes    Sunil Dorman MA    NOTE:  If Pt is not in [place], Appointment needs to be canceled and rescheduled.

## 2023-07-13 NOTE — PATIENT INSTRUCTIONS
Pain Physical Therapy:  YES   She is working with [facility], though she notes due to hypersensitivity to touch, therapy has been somewhat limited.  She notes she is doing recommended stretches at home, no substantial improvement yet.  Recommend continue working with pain PT, will reassess for benefit at follow-up.     Pain Psychologist to address relaxation, behavioral change, coping style, and other factors important to improvement.  NO     Diagnostic Studies:  Reviewed lumbar MRI from 2022 - multilevel degenerative changes.      Medication Management:   Increase baclofen to 20 mg twice daily.  She reports small improvement after starting baclofen at last visit.  Though she continues to have significant neck and shoulder myofascial tightness.  Advised we will trial increase to 20 mg twice daily, then if no change in benefit, we will plan to taper back down and possibly off medication, may consider alternatives at this point.  No side effects.  Updated prescription sent into pharmacy today.  Change flexeril - continue 5-10 mg twice daily as needed. She takes in morning and afternoon, appreciates benefit for significant myofascial tightness.   Allow 4-6 hours in between all muscle relaxant doses, avoid concurrent alcohol use.      Potential procedures:   She had bilateral SI joint injections with Dr. Reynaga on 5/16/2023.  She reports significant benefit for a month or so, then noticed pain starting to return, though she thinks she still appreciates some benefit, notes somewhat lower intensity of low back pain.      Other Orders/Referrals:   Recommend using TENS unit a few times throughout the day as needed, particularly when engaging in activities that may increase pain.  She reports using a little bit more since last visit, but notes she knows she could be using more.  Advised to take to pain PT for assistance with device as needed.     Follow up with JEANNIE Schroeder CNP in 8 weeks or sooner if needed.      ----------------------------------------------------------------  [facility] Number:  [phone]   Call with any questions about your care and for scheduling assistance.   Calls are returned Monday through Friday between 8 AM and 4:30 PM. We usually get back to you within 2 business days depending on the issue/request.    If we are prescribing your medications:  For opioid medication refills, call the clinic or send a [email or web address] message 7 days in advance.  Please include:  Name of requested medication  Name of the pharmacy.  For non-opioid medications, call your pharmacy directly to request a refill. Please allow 3-4 days to be processed.   Per [place] State Law:  All controlled substance prescriptions must be filled within 30 days of being written.    For those controlled substances allowing refills, pickup must occur within 30 days of last fill.      We believe regular attendance is key to your success in our program!    Any time you are unable to keep your appointment we ask that you call us at least 24 hours in advance to cancel.This will allow us to offer the appointment time to another patient.   Multiple missed appointments may lead to dismissal from the clinic.

## 2023-07-17 ENCOUNTER — THERAPY VISIT (OUTPATIENT)
Dept: PHYSICAL THERAPY | Facility: CLINIC | Age: 48
End: 2023-07-17
Payer: COMMERCIAL

## 2023-07-17 DIAGNOSIS — G89.29 CHRONIC MIDLINE LOW BACK PAIN WITHOUT SCIATICA: ICD-10-CM

## 2023-07-17 DIAGNOSIS — M54.50 CHRONIC MIDLINE LOW BACK PAIN WITHOUT SCIATICA: ICD-10-CM

## 2023-07-17 DIAGNOSIS — M54.2 CHRONIC NECK PAIN: Primary | ICD-10-CM

## 2023-07-17 DIAGNOSIS — F41.9 ANXIETY: ICD-10-CM

## 2023-07-17 DIAGNOSIS — G89.29 CHRONIC NECK PAIN: Primary | ICD-10-CM

## 2023-07-17 PROCEDURE — 97112 NEUROMUSCULAR REEDUCATION: CPT | Mod: GP | Performed by: PHYSICAL THERAPIST

## 2023-07-17 RX ORDER — ALPRAZOLAM 0.5 MG
TABLET ORAL
Qty: 60 TABLET | Refills: 0 | Status: SHIPPED | OUTPATIENT
Start: 2023-07-19 | End: 2023-08-17

## 2023-07-24 ENCOUNTER — THERAPY VISIT (OUTPATIENT)
Dept: PHYSICAL THERAPY | Facility: CLINIC | Age: 48
End: 2023-07-24
Payer: COMMERCIAL

## 2023-07-24 ENCOUNTER — VIRTUAL VISIT (OUTPATIENT)
Dept: PSYCHOLOGY | Facility: CLINIC | Age: 48
End: 2023-07-24
Payer: COMMERCIAL

## 2023-07-24 DIAGNOSIS — G89.29 CHRONIC MIDLINE LOW BACK PAIN WITHOUT SCIATICA: ICD-10-CM

## 2023-07-24 DIAGNOSIS — F41.9 ANXIETY: ICD-10-CM

## 2023-07-24 DIAGNOSIS — F41.1 GENERALIZED ANXIETY DISORDER: ICD-10-CM

## 2023-07-24 DIAGNOSIS — G89.29 CHRONIC NECK PAIN: ICD-10-CM

## 2023-07-24 DIAGNOSIS — F33.1 MODERATE EPISODE OF RECURRENT MAJOR DEPRESSIVE DISORDER (H): ICD-10-CM

## 2023-07-24 DIAGNOSIS — M54.2 CHRONIC NECK PAIN: ICD-10-CM

## 2023-07-24 DIAGNOSIS — K59.03 CONSTIPATION DUE TO OPIOID THERAPY: ICD-10-CM

## 2023-07-24 DIAGNOSIS — M54.50 CHRONIC MIDLINE LOW BACK PAIN WITHOUT SCIATICA: ICD-10-CM

## 2023-07-24 DIAGNOSIS — F33.1 MODERATE EPISODE OF RECURRENT MAJOR DEPRESSIVE DISORDER (H): Primary | ICD-10-CM

## 2023-07-24 DIAGNOSIS — T40.2X5A CONSTIPATION DUE TO OPIOID THERAPY: ICD-10-CM

## 2023-07-24 DIAGNOSIS — K21.00 GASTROESOPHAGEAL REFLUX DISEASE WITH ESOPHAGITIS WITHOUT HEMORRHAGE: ICD-10-CM

## 2023-07-24 DIAGNOSIS — G89.29 CHRONIC NECK PAIN: Primary | ICD-10-CM

## 2023-07-24 DIAGNOSIS — M54.2 CHRONIC NECK PAIN: Primary | ICD-10-CM

## 2023-07-24 PROCEDURE — 97140 MANUAL THERAPY 1/> REGIONS: CPT | Mod: GP | Performed by: PHYSICAL THERAPIST

## 2023-07-24 PROCEDURE — 90834 PSYTX W PT 45 MINUTES: CPT | Mod: VID | Performed by: COUNSELOR

## 2023-07-24 PROCEDURE — 97112 NEUROMUSCULAR REEDUCATION: CPT | Mod: GP | Performed by: PHYSICAL THERAPIST

## 2023-07-24 RX ORDER — DULOXETIN HYDROCHLORIDE 30 MG/1
CAPSULE, DELAYED RELEASE ORAL
Qty: 180 CAPSULE | Refills: 0 | Status: SHIPPED | OUTPATIENT
Start: 2023-07-24 | End: 2023-09-15

## 2023-07-24 NOTE — TELEPHONE ENCOUNTER
"Pending Prescriptions:                       Disp   Refills    DULoxetine (CYMBALTA) 30 MG capsule [Pharm*180 ca*0        Sig: TAKE ONE CAPSULE BY MOUTH TWICE A DAY    Routing refill request to provider for review/approval because:  Labs out of range:      PHQ-9 score:        6/28/2023    11:26 AM   PHQ   PHQ-9 Total Score 19   Q9: Thoughts of better off dead/self-harm past 2 weeks Not at all         Requested Prescriptions   Pending Prescriptions Disp Refills    DULoxetine (CYMBALTA) 30 MG capsule [Pharmacy Med Name: DULOXETINE HCL 30MG CPEP] 180 capsule 0     Sig: TAKE ONE CAPSULE BY MOUTH TWICE A DAY       Serotonin-Norepinephrine Reuptake Inhibitors  Failed - 7/24/2023 10:57 AM        Failed - Blood pressure under 140/90 in past 12 months     BP Readings from Last 3 Encounters:   06/20/23 (!) 160/100   05/23/23 (!) 169/112   05/16/23 (!) 143/92                 Failed - PHQ-9 score of less than 5 in past 6 months     Please review last PHQ-9 score.           Failed - Recent (6 mo) or future (30 days) visit within the authorizing provider's specialty     Patient had office visit in the last 6 months or has a visit in the next 30 days with authorizing provider or within the authorizing provider's specialty.  See \"Patient Info\" tab in inbasket, or \"Choose Columns\" in Meds & Orders section of the refill encounter.            Passed - Medication is active on med list        Passed - Patient is age 18 or older        Passed - No active pregnancy on record        Passed - No positive pregnancy test in past 12 months                   "

## 2023-07-24 NOTE — PROGRESS NOTES
Answers for HPI/ROS submitted by the patient on 6/28/2023  If you checked off any problems, how difficult have these problems made it for you to do your work, take care of things at home, or get along with other people?: Extremely difficult  PHQ9 TOTAL SCORE: 19          [facility]                                     Progress Note    Patient Name: Radha Beckwith  Date: 7/24/23         Service Type: Individual      Session Start Time: 3:35pm Session End Time: 4:25pm     Session Length: 50    Session #: 3    Attendees: Client    Service Modality:  Video Visit:      Provider verified identity through the following two step process.  Patient provided:  Patient is known previously to provider    Telemedicine Visit: The patient's condition can be safely assessed and treated via synchronous audio and visual telemedicine encounter.      Reason for Telemedicine Visit: Patient convenience (e.g. access to timely appointments / distance to available provider)    Originating Site (Patient Location): Patient's home    Distant Site (Provider Location): Provider Remote Setting- Home Office    Consent:  The patient/guardian has verbally consented to: the potential risks and benefits of telemedicine (video visit) versus in person care; bill my insurance or make self-payment for services provided; and responsibility for payment of non-covered services.     Patient would like the video invitation sent by:  My Chart    Mode of Communication:  Video Conference via [employer]    Distant Location (Provider):  Off-site    As the provider I attest to compliance with applicable laws and regulations related to telemedicine.    DATA  Interactive Complexity: No  Crisis: No        Progress Since Last Session (Related to Symptoms / Goals / Homework):   Symptoms: No change .    Homework: Completed in session      Episode of Care Goals: Minimal progress - ACTION (Actively working towards change); Intervened by reinforcing change  plan / affirming steps taken     Current / Ongoing Stressors and Concerns:   Client stated her son has a girlfriend (1 year together) and they recently went to his  ball together and she's proud of him. She stated he wants to get out of the service; has about 2.5 years left. He might want to stay in [place].   Dad  when he was 55 and he had 11 brothers and sisters. She stated when she goes to a family reunion she feels really out of place. She went to see her stepbrother, Rick, who lives 1.5 hours away when her son was home.   Doesn't have a relationship with her biobrother, Elvis. She stated he still has animosity towards her for her past when she lived in a tent and then when she had to move in with their mom.        Treatment Objective(s) Addressed in This Session:   Increase interest, engagement, and pleasure in doing things  Feel less tired and more energy during the day        Intervention:   Talked about how the rest of her time was with her son being home. She stated his room at home continues to be a mess and she's hoping at some point he will take the rest of his things with his to [place]. Talked about her trying to get on disability services as her functioning continues to decline.     Assessments completed prior to visit:  The following assessments were completed by patient for this visit:  PHQ9:       2023    12:54 PM 2023    11:36 AM 3/27/2023     2:53 PM 2023    12:41 PM 2023    10:17 AM 2023    12:20 PM 2023    11:26 AM   PHQ-9 SCORE   PHQ-9 Total Score MyChart 18 (Moderately severe depression) 16 (Moderately severe depression) 16 (Moderately severe depression) 18 (Moderately severe depression) 20 (Severe depression) 19 (Moderately severe depression) 19 (Moderately severe depression)   PHQ-9 Total Score 18 16 16 18    18 20 19 19     GAD7:       2023    11:37 AM 2023    11:38 AM 3/25/2023     3:01 PM 2023    12:45 PM 2023     1:00 PM 5/10/2023     12:51 PM 6/12/2023    10:19 AM   BO-7 SCORE   Total Score 20 (severe anxiety) 17 (severe anxiety) 16 (severe anxiety) 15 (severe anxiety)  17 (severe anxiety) 19 (severe anxiety)   Total Score 20 17    17    17    17    17 16 15    15 18 17 19    19     PROMIS 10-Global Health (all questions and answers displayed):       10/26/2022    12:50 PM 1/25/2023    12:48 PM 2/7/2023    12:57 PM 4/17/2023     2:12 PM 6/25/2023     2:45 PM   PROMIS 10   In general, would you say your health is:    Poor Poor   In general, would you say your quality of life is:    Poor Fair   In general, how would you rate your physical health?    Poor Poor   In general, how would you rate your mental health, including your mood and your ability to think?    Fair Fair   In general, how would you rate your satisfaction with your social activities and relationships?    Fair Poor   In general, please rate how well you carry out your usual social activities and roles    Fair Poor   To what extent are you able to carry out your everyday physical activities such as walking, climbing stairs, carrying groceries, or moving a chair?    A little Moderately   In the past 7 days, how often have you been bothered by emotional problems such as feeling anxious, depressed, or irritable?    Always Always   In the past 7 days, how would you rate your fatigue on average?    Moderate Severe   In the past 7 days, how would you rate your pain on average, where 0 means no pain, and 10 means worst imaginable pain?    8 7   In general, would you say your health is:    1    1 1   In general, would you say your quality of life is:    1    1 2   In general, how would you rate your physical health?    1    1 1   In general, how would you rate your mental health, including your mood and your ability to think?    2    2 2   In general, how would you rate your satisfaction with your social activities and relationships?    2    2 1   In general, please rate how well you carry  out your usual social activities and roles. (This includes activities at home, at work and in your community, and responsibilities as a parent, child, spouse, employee, friend, etc.)    2    2 1   To what extent are you able to carry out your everyday physical activities such as walking, climbing stairs, carrying groceries, or moving a chair?    2    2 3   In the past 7 days, how often have you been bothered by emotional problems such as feeling anxious, depressed, or irritable?    5    5 5   In the past 7 days, how would you rate your fatigue on average?    3    3 4   In the past 7 days, how would you rate your pain on average, where 0 means no pain, and 10 means worst imaginable pain?    8    8 7   Global Mental Health Score    6    6 6   Global Physical Health Score    8    8 8   PROMIS TOTAL - SUBSCORES    14    14 14       Information is confidential and restricted. Go to Review Flowsheets to unlock data.    Multiple values from one day are sorted in reverse-chronological order     [place] Suicide Severity Rating Scale (Lifetime/Recent)      10/4/2022    11:00 AM   [place] Suicide Severity Rating (Lifetime/Recent)   Q1 Wished to be Dead (Past Month) no   Q2 Suicidal Thoughts (Past Month) no   Q3 Suicidal Thought Method no   Q4 Suicidal Intent without Specific Plan no   Q5 Suicide Intent with Specific Plan yes   Q6 Suicide Behavior (Lifetime) yes   Within the Past 3 Months? no   Level of Risk per Screen high risk         ASSESSMENT: Current Emotional / Mental Status (status of significant symptoms):   Risk status (Self / Other harm or suicidal ideation)   Patient denies current fears or concerns for personal safety.   Patient denies current or recent suicidal ideation or behaviors.   Patient denies current or recent homicidal ideation or behaviors.   Patient denies current or recent self injurious behavior or ideation.   Patient denies other safety concerns.   Patient reports there has been no change in risk  factors since their last session.     Patient reports there has been no change in protective factors since their last session.     Recommended that patient call 911 or go to the local ED should there be a change in any of these risk factors.     Appearance:   Appropriate    Eye Contact:   Good    Psychomotor Behavior: Normal    Attitude:   Cooperative    Orientation:   All   Speech    Rate / Production: Normal/ Responsive Normal     Volume:  Normal    Mood:    Normal   Affect:    Appropriate    Thought Content:  Clear    Thought Form:  Coherent  Logical    Insight:    Good      Medication Review:   No changes to current psychiatric medication(s)     Medication Compliance:   Yes     Changes in Health Issues:   None reported     Chemical Use Review:   Substance Use: Chemical use reviewed, no active concerns identified      Tobacco Use: No change in amount of tobacco use since last session.  Patient declined discussion at this time    Diagnosis:  1. Moderate episode of recurrent major depressive disorder (H)    2. Generalized anxiety disorder        Collateral Reports Completed:   Not Applicable    PLAN: (Patient Tasks / Therapist Tasks / Other)  Continue to work on stress reduction skills.         Ricarda Bhatia, [facility]                                                         ______________________________________________________________________    Individual Treatment Plan    Patient's Name: Radha Beckwith  YOB: 1975    Date of Creation: 6/28/23  Date Treatment Plan Last Reviewed/Revised: 6/28/23    DSM5 Diagnoses: 296.32 (F33.1) Major Depressive Disorder, Recurrent Episode, Moderate _  Psychosocial / Contextual Factors: living with boyfriend, memory issues  PROMIS (reviewed every 90 days):     Referral / Collaboration:  Referral to another professional/service is not indicated at this time..    Anticipated number of session for this episode of care: 9-12 sessions  Anticipation frequency of  session:  as needed  Anticipated Duration of each session: 38-52 minutes  Treatment plan will be reviewed in 90 days or when goals have been changed.       MeasurableTreatment Goal(s) related to diagnosis / functional impairment(s)  Goal 1: Patient will increase communication skills with family members.    Objective #A (Patient Action)    Patient will learn & utilize at least 2 assertive communication skills weekly.  Status: New - Date: 6/28/23      Intervention(s)  Therapist will teach emotional regulation skills. distress tolerance skills, interpersonal effectiveness skills, emotion regluation skills, mindfulness skills, radical acceptance. Therapist will teach client how to ID body cues for anxiety, anxiety reduction techniques, how to ID triggers for depression and anxiety- decrease reactivity/eliminate, lifestyle changes to reduce depression and anxiety, communication skills, explore cognitive beliefs and help client to develop healthy cognitive patterns and beliefs.    Objective #B  Patient will use thought-stopping strategy daily to reduce intrusive thoughts.  Status: New - Date: 6/28/23      Intervention(s)  Therapist will teach emotional regulation skills. distress tolerance skills, interpersonal effectiveness skills, emotion regluation skills, mindfulness skills, radical acceptance. Therapist will teach client how to ID body cues for anxiety, anxiety reduction techniques, how to ID triggers for depression and anxiety- decrease reactivity/eliminate, lifestyle changes to reduce depression and anxiety, communication skills, explore cognitive beliefs and help client to develop healthy cognitive patterns and beliefs.      Goal 2: Patient will decrease depression symptoms.      Objective #A (Patient Action)    Status: New - Date: 6/28/23      Patient will Increase interest, engagement, and pleasure in doing things  Decrease frequency and intensity of feeling down, depressed, hopeless  Improve quantity and  quality of night time sleep / decrease daytime naps  Feel less tired and more energy during the day   Improve diet, appetite, mindful eating, and / or meal planning  Identify negative self-talk and behaviors: challenge core beliefs, myths, and actions  Improve concentration, focus, and mindfulness in daily activities   Feel less fidgety, restless or slow in daily activities / interpersonal interactions.    Intervention(s)  Therapist will teach emotional regulation skills. distress tolerance skills, interpersonal effectiveness skills, emotion regluation skills, mindfulness skills, radical acceptance. Therapist will teach client how to ID body cues for anxiety, anxiety reduction techniques, how to ID triggers for depression and anxiety- decrease reactivity/eliminate, lifestyle changes to reduce depression and anxiety, communication skills, explore cognitive beliefs and help client to develop healthy cognitive patterns and beliefs.    Objective #B  Patient will identify three distraction and diversion activities and use those activities to decrease level of anxiety  .    Status: New - Date: 6/28/23      Intervention(s)  Therapist will teach emotional regulation skills. distress tolerance skills, interpersonal effectiveness skills, emotion regluation skills, mindfulness skills, radical acceptance. Therapist will teach client how to ID body cues for anxiety, anxiety reduction techniques, how to ID triggers for depression and anxiety- decrease reactivity/eliminate, lifestyle changes to reduce depression and anxiety, communication skills, explore cognitive beliefs and help client to develop healthy cognitive patterns and beliefs.      Patient has reviewed and agreed to the above plan.      Ricarda Bhatia [facility]  June 28, 2023

## 2023-07-25 ENCOUNTER — MYC MEDICAL ADVICE (OUTPATIENT)
Dept: GASTROENTEROLOGY | Facility: CLINIC | Age: 48
End: 2023-07-25
Payer: COMMERCIAL

## 2023-07-25 DIAGNOSIS — K59.03 CONSTIPATION DUE TO OPIOID THERAPY: ICD-10-CM

## 2023-07-25 DIAGNOSIS — K21.00 GASTROESOPHAGEAL REFLUX DISEASE WITH ESOPHAGITIS WITHOUT HEMORRHAGE: ICD-10-CM

## 2023-07-25 DIAGNOSIS — T40.2X5A CONSTIPATION DUE TO OPIOID THERAPY: ICD-10-CM

## 2023-07-25 RX ORDER — PANTOPRAZOLE SODIUM 40 MG/1
40 TABLET, DELAYED RELEASE ORAL
Qty: 60 TABLET | Refills: 1 | Status: CANCELLED | OUTPATIENT
Start: 2023-07-25

## 2023-07-26 DIAGNOSIS — F41.9 ANXIETY: ICD-10-CM

## 2023-07-26 DIAGNOSIS — G25.81 RESTLESS LEGS SYNDROME (RLS): ICD-10-CM

## 2023-07-26 DIAGNOSIS — R11.0 NAUSEA: ICD-10-CM

## 2023-07-26 RX ORDER — ONDANSETRON 4 MG/1
TABLET, ORALLY DISINTEGRATING ORAL
Qty: 20 TABLET | Refills: 1 | Status: SHIPPED | OUTPATIENT
Start: 2023-07-26 | End: 2023-10-13

## 2023-07-26 RX ORDER — PANTOPRAZOLE SODIUM 40 MG/1
40 TABLET, DELAYED RELEASE ORAL
Qty: 60 TABLET | Refills: 5 | Status: SHIPPED | OUTPATIENT
Start: 2023-07-26 | End: 2024-01-26

## 2023-07-26 RX ORDER — ROPINIROLE 1 MG/1
TABLET, FILM COATED ORAL
Qty: 90 TABLET | Refills: 0 | Status: SHIPPED | OUTPATIENT
Start: 2023-07-26 | End: 2023-12-14

## 2023-07-26 NOTE — TELEPHONE ENCOUNTER
Prescription approved per [facility] Refill Protocol.  Nara Mendez, RN  [facility] Triage Nurse

## 2023-07-26 NOTE — TELEPHONE ENCOUNTER
Refill was sent to [facility] not [facility]el[facility].    Prescription approved per [facility] Refill Protocol.    Juana Jnuior RN

## 2023-07-31 ENCOUNTER — THERAPY VISIT (OUTPATIENT)
Dept: PHYSICAL THERAPY | Facility: CLINIC | Age: 48
End: 2023-07-31
Payer: COMMERCIAL

## 2023-07-31 ENCOUNTER — LAB (OUTPATIENT)
Dept: LAB | Facility: CLINIC | Age: 48
End: 2023-07-31
Payer: COMMERCIAL

## 2023-07-31 DIAGNOSIS — F11.21 OPIOID DEPENDENCE IN REMISSION (H): ICD-10-CM

## 2023-07-31 DIAGNOSIS — G89.29 CHRONIC MIDLINE LOW BACK PAIN WITHOUT SCIATICA: ICD-10-CM

## 2023-07-31 DIAGNOSIS — M54.2 CHRONIC NECK PAIN: ICD-10-CM

## 2023-07-31 DIAGNOSIS — G89.29 CHRONIC NECK PAIN: ICD-10-CM

## 2023-07-31 DIAGNOSIS — M54.2 CHRONIC NECK PAIN: Primary | ICD-10-CM

## 2023-07-31 DIAGNOSIS — G89.29 CHRONIC NECK PAIN: Primary | ICD-10-CM

## 2023-07-31 DIAGNOSIS — M54.50 CHRONIC MIDLINE LOW BACK PAIN WITHOUT SCIATICA: ICD-10-CM

## 2023-07-31 LAB
AMPHETAMINES UR QL: NOT DETECTED
BARBITURATES UR QL SCN: NOT DETECTED
BENZODIAZ UR QL SCN: DETECTED
BUPRENORPHINE UR QL: NOT DETECTED
CANNABINOIDS UR QL: NOT DETECTED
COCAINE UR QL SCN: NOT DETECTED
D-METHAMPHET UR QL: NOT DETECTED
METHADONE UR QL SCN: NOT DETECTED
OPIATES UR QL SCN: NOT DETECTED
OXYCODONE UR QL SCN: NOT DETECTED
PCP UR QL SCN: NOT DETECTED
PROPOXYPH UR QL: NOT DETECTED
TRICYCLICS UR QL SCN: NOT DETECTED

## 2023-07-31 PROCEDURE — 97140 MANUAL THERAPY 1/> REGIONS: CPT | Mod: GP | Performed by: PHYSICAL THERAPIST

## 2023-07-31 PROCEDURE — 80306 DRUG TEST PRSMV INSTRMNT: CPT

## 2023-07-31 PROCEDURE — 97112 NEUROMUSCULAR REEDUCATION: CPT | Mod: GP | Performed by: PHYSICAL THERAPIST

## 2023-08-01 ENCOUNTER — VIRTUAL VISIT (OUTPATIENT)
Dept: PSYCHOLOGY | Facility: OTHER | Age: 48
End: 2023-08-01
Payer: COMMERCIAL

## 2023-08-01 DIAGNOSIS — F33.1 MODERATE EPISODE OF RECURRENT MAJOR DEPRESSIVE DISORDER (H): Primary | ICD-10-CM

## 2023-08-01 DIAGNOSIS — F41.1 GENERALIZED ANXIETY DISORDER: ICD-10-CM

## 2023-08-01 PROCEDURE — 90834 PSYTX W PT 45 MINUTES: CPT | Mod: VID | Performed by: COUNSELOR

## 2023-08-01 ASSESSMENT — ANXIETY QUESTIONNAIRES
1. FEELING NERVOUS, ANXIOUS, OR ON EDGE: NEARLY EVERY DAY
IF YOU CHECKED OFF ANY PROBLEMS ON THIS QUESTIONNAIRE, HOW DIFFICULT HAVE THESE PROBLEMS MADE IT FOR YOU TO DO YOUR WORK, TAKE CARE OF THINGS AT HOME, OR GET ALONG WITH OTHER PEOPLE: EXTREMELY DIFFICULT
GAD7 TOTAL SCORE: 19
GAD7 TOTAL SCORE: 19
3. WORRYING TOO MUCH ABOUT DIFFERENT THINGS: NEARLY EVERY DAY
4. TROUBLE RELAXING: NEARLY EVERY DAY
5. BEING SO RESTLESS THAT IT IS HARD TO SIT STILL: SEVERAL DAYS
7. FEELING AFRAID AS IF SOMETHING AWFUL MIGHT HAPPEN: NEARLY EVERY DAY
6. BECOMING EASILY ANNOYED OR IRRITABLE: NEARLY EVERY DAY
2. NOT BEING ABLE TO STOP OR CONTROL WORRYING: NEARLY EVERY DAY

## 2023-08-01 ASSESSMENT — PATIENT HEALTH QUESTIONNAIRE - PHQ9
10. IF YOU CHECKED OFF ANY PROBLEMS, HOW DIFFICULT HAVE THESE PROBLEMS MADE IT FOR YOU TO DO YOUR WORK, TAKE CARE OF THINGS AT HOME, OR GET ALONG WITH OTHER PEOPLE: EXTREMELY DIFFICULT
SUM OF ALL RESPONSES TO PHQ QUESTIONS 1-9: 22
SUM OF ALL RESPONSES TO PHQ QUESTIONS 1-9: 22

## 2023-08-01 NOTE — PROGRESS NOTES
Answers submitted by the patient for this visit:  Adult Psychotherapy on 8/1/2023  2:00 PM with Ricarda CHOI [facility]  Patient Health Questionnaire (Submitted on 8/1/2023)  If you checked off any problems, how difficult have these problems made it for you to do your work, take care of things at home, or get along with other people?: Extremely difficult  PHQ9 TOTAL SCORE: 22  BO-7 (Submitted on 8/1/2023)  BO 7 TOTAL SCORE: 19  Answers for HPI/ROS submitted by the patient on 6/28/2023  If you checked off any problems, how difficult have these problems made it for you to do your work, take care of things at home, or get along with other people?: Extremely difficult  PHQ9 TOTAL SCORE: 19          [facility]                                     Progress Note    Patient Name: Radha Beckwith  Date: 8/1/23         Service Type: Individual      Session Start Time: 2:05pm Session End Time: 2:55pm     Session Length: 50    Session #: 4    Attendees: Client    Service Modality:  Video Visit:      Provider verified identity through the following two step process.  Patient provided:  Patient is known previously to provider    Telemedicine Visit: The patient's condition can be safely assessed and treated via synchronous audio and visual telemedicine encounter.      Reason for Telemedicine Visit: Patient convenience (e.g. access to timely appointments / distance to available provider)    Originating Site (Patient Location): Patient's home    Distant Site (Provider Location): Provider Remote Setting- Home Office    Consent:  The patient/guardian has verbally consented to: the potential risks and benefits of telemedicine (video visit) versus in person care; bill my insurance or make self-payment for services provided; and responsibility for payment of non-covered services.     Patient would like the video invitation sent by:  My Chart    Mode of Communication:  Video Conference via [facility]    Distant Location  (Provider):  Off-site    As the provider I attest to compliance with applicable laws and regulations related to telemedicine.    DATA  Interactive Complexity: No  Crisis: No        Progress Since Last Session (Related to Symptoms / Goals / Homework):   Symptoms: No change .    Homework: Completed in session      Episode of Care Goals: Minimal progress - ACTION (Actively working towards change); Intervened by reinforcing change plan / affirming steps taken     Current / Ongoing Stressors and Concerns:   Client requested a virtual appointment today due to being in a lot of pain from gardening.        Treatment Objective(s) Addressed in This Session:   Increase interest, engagement, and pleasure in doing things  Feel less tired and more energy during the day        Intervention:   Talked about her pain levels and processed her feelings on the loss of abilities she's been experiencing because of it. Talked about radical acceptance and how she can continue to work on improving her functioning.     Assessments completed prior to visit:  The following assessments were completed by patient for this visit:  PHQ9:       2/14/2023    11:36 AM 3/27/2023     2:53 PM 4/17/2023    12:41 PM 6/12/2023    10:17 AM 6/19/2023    12:20 PM 6/28/2023    11:26 AM 8/1/2023    12:12 PM   PHQ-9 SCORE   PHQ-9 Total Score MyChart 16 (Moderately severe depression) 16 (Moderately severe depression) 18 (Moderately severe depression) 20 (Severe depression) 19 (Moderately severe depression) 19 (Moderately severe depression) 22 (Severe depression)   PHQ-9 Total Score 16 16 18    18 20 19 19 22     GAD7:       2/14/2023    11:38 AM 3/25/2023     3:01 PM 4/17/2023    12:45 PM 4/26/2023     1:00 PM 5/10/2023    12:51 PM 6/12/2023    10:19 AM 8/1/2023    12:15 PM   BO-7 SCORE   Total Score 17 (severe anxiety) 16 (severe anxiety) 15 (severe anxiety)  17 (severe anxiety) 19 (severe anxiety) 19 (severe anxiety)   Total Score 17    17    17    17    17 16  15    15 18 17 19    19 19     PROMIS 10-Global Health (all questions and answers displayed):       10/26/2022    12:50 PM 1/25/2023    12:48 PM 2/7/2023    12:57 PM 4/17/2023     2:12 PM 6/25/2023     2:45 PM   PROMIS 10   In general, would you say your health is:    Poor Poor   In general, would you say your quality of life is:    Poor Fair   In general, how would you rate your physical health?    Poor Poor   In general, how would you rate your mental health, including your mood and your ability to think?    Fair Fair   In general, how would you rate your satisfaction with your social activities and relationships?    Fair Poor   In general, please rate how well you carry out your usual social activities and roles    Fair Poor   To what extent are you able to carry out your everyday physical activities such as walking, climbing stairs, carrying groceries, or moving a chair?    A little Moderately   In the past 7 days, how often have you been bothered by emotional problems such as feeling anxious, depressed, or irritable?    Always Always   In the past 7 days, how would you rate your fatigue on average?    Moderate Severe   In the past 7 days, how would you rate your pain on average, where 0 means no pain, and 10 means worst imaginable pain?    8 7   In general, would you say your health is:    1    1 1   In general, would you say your quality of life is:    1    1 2   In general, how would you rate your physical health?    1    1 1   In general, how would you rate your mental health, including your mood and your ability to think?    2    2 2   In general, how would you rate your satisfaction with your social activities and relationships?    2    2 1   In general, please rate how well you carry out your usual social activities and roles. (This includes activities at home, at work and in your community, and responsibilities as a parent, child, spouse, employee, friend, etc.)    2    2 1   To what extent are you able  to carry out your everyday physical activities such as walking, climbing stairs, carrying groceries, or moving a chair?    2    2 3   In the past 7 days, how often have you been bothered by emotional problems such as feeling anxious, depressed, or irritable?    5    5 5   In the past 7 days, how would you rate your fatigue on average?    3    3 4   In the past 7 days, how would you rate your pain on average, where 0 means no pain, and 10 means worst imaginable pain?    8    8 7   Global Mental Health Score    6    6 6   Global Physical Health Score    8    8 8   PROMIS TOTAL - SUBSCORES    14    14 14       Information is confidential and restricted. Go to Review Flowsheets to unlock data.    Multiple values from one day are sorted in reverse-chronological order     [place] Suicide Severity Rating Scale (Lifetime/Recent)      10/4/2022    11:00 AM   [place] Suicide Severity Rating (Lifetime/Recent)   Q1 Wished to be Dead (Past Month) no   Q2 Suicidal Thoughts (Past Month) no   Q3 Suicidal Thought Method no   Q4 Suicidal Intent without Specific Plan no   Q5 Suicide Intent with Specific Plan yes   Q6 Suicide Behavior (Lifetime) yes   Within the Past 3 Months? no   Level of Risk per Screen high risk         ASSESSMENT: Current Emotional / Mental Status (status of significant symptoms):   Risk status (Self / Other harm or suicidal ideation)   Patient denies current fears or concerns for personal safety.   Patient denies current or recent suicidal ideation or behaviors.   Patient denies current or recent homicidal ideation or behaviors.   Patient denies current or recent self injurious behavior or ideation.   Patient denies other safety concerns.   Patient reports there has been no change in risk factors since their last session.     Patient reports there has been no change in protective factors since their last session.     Recommended that patient call 911 or go to the local ED should there be a change in any of  these risk factors.     Appearance:   Appropriate    Eye Contact:   Good    Psychomotor Behavior: Normal    Attitude:   Cooperative    Orientation:   All   Speech    Rate / Production: Normal/ Responsive Normal     Volume:  Normal    Mood:    Normal   Affect:    Appropriate    Thought Content:  Clear    Thought Form:  Coherent  Logical    Insight:    Good      Medication Review:   No changes to current psychiatric medication(s)     Medication Compliance:   Yes     Changes in Health Issues:   None reported     Chemical Use Review:   Substance Use: Chemical use reviewed, no active concerns identified      Tobacco Use: No change in amount of tobacco use since last session.  Patient declined discussion at this time    Diagnosis:  1. Moderate episode of recurrent major depressive disorder (H)    2. Generalized anxiety disorder        Collateral Reports Completed:   Not Applicable    PLAN: (Patient Tasks / Therapist Tasks / Other)  Continue to work on stress reduction skills.         Ricarda Bhatia [facility]                                                         ______________________________________________________________________    Individual Treatment Plan    Patient's Name: Radha Beckwith  YOB: 1975    Date of Creation: 6/28/23  Date Treatment Plan Last Reviewed/Revised: 6/28/23    DSM5 Diagnoses: 296.32 (F33.1) Major Depressive Disorder, Recurrent Episode, Moderate _  Psychosocial / Contextual Factors: living with boyfriend, memory issues  PROMIS (reviewed every 90 days):     Referral / Collaboration:  Referral to another professional/service is not indicated at this time..    Anticipated number of session for this episode of care: 9-12 sessions  Anticipation frequency of session:  as needed  Anticipated Duration of each session: 38-52 minutes  Treatment plan will be reviewed in 90 days or when goals have been changed.       MeasurableTreatment Goal(s) related to diagnosis / functional  impairment(s)  Goal 1: Patient will increase communication skills with family members.    Objective #A (Patient Action)    Patient will learn & utilize at least 2 assertive communication skills weekly.  Status: New - Date: 6/28/23      Intervention(s)  Therapist will teach emotional regulation skills. distress tolerance skills, interpersonal effectiveness skills, emotion regluation skills, mindfulness skills, radical acceptance. Therapist will teach client how to ID body cues for anxiety, anxiety reduction techniques, how to ID triggers for depression and anxiety- decrease reactivity/eliminate, lifestyle changes to reduce depression and anxiety, communication skills, explore cognitive beliefs and help client to develop healthy cognitive patterns and beliefs.    Objective #B  Patient will use thought-stopping strategy daily to reduce intrusive thoughts.  Status: New - Date: 6/28/23      Intervention(s)  Therapist will teach emotional regulation skills. distress tolerance skills, interpersonal effectiveness skills, emotion regluation skills, mindfulness skills, radical acceptance. Therapist will teach client how to ID body cues for anxiety, anxiety reduction techniques, how to ID triggers for depression and anxiety- decrease reactivity/eliminate, lifestyle changes to reduce depression and anxiety, communication skills, explore cognitive beliefs and help client to develop healthy cognitive patterns and beliefs.      Goal 2: Patient will decrease depression symptoms.      Objective #A (Patient Action)    Status: New - Date: 6/28/23      Patient will Increase interest, engagement, and pleasure in doing things  Decrease frequency and intensity of feeling down, depressed, hopeless  Improve quantity and quality of night time sleep / decrease daytime naps  Feel less tired and more energy during the day   Improve diet, appetite, mindful eating, and / or meal planning  Identify negative self-talk and behaviors: challenge core  beliefs, myths, and actions  Improve concentration, focus, and mindfulness in daily activities   Feel less fidgety, restless or slow in daily activities / interpersonal interactions.    Intervention(s)  Therapist will teach emotional regulation skills. distress tolerance skills, interpersonal effectiveness skills, emotion regluation skills, mindfulness skills, radical acceptance. Therapist will teach client how to ID body cues for anxiety, anxiety reduction techniques, how to ID triggers for depression and anxiety- decrease reactivity/eliminate, lifestyle changes to reduce depression and anxiety, communication skills, explore cognitive beliefs and help client to develop healthy cognitive patterns and beliefs.    Objective #B  Patient will identify three distraction and diversion activities and use those activities to decrease level of anxiety  .    Status: New - Date: 6/28/23      Intervention(s)  Therapist will teach emotional regulation skills. distress tolerance skills, interpersonal effectiveness skills, emotion regluation skills, mindfulness skills, radical acceptance. Therapist will teach client how to ID body cues for anxiety, anxiety reduction techniques, how to ID triggers for depression and anxiety- decrease reactivity/eliminate, lifestyle changes to reduce depression and anxiety, communication skills, explore cognitive beliefs and help client to develop healthy cognitive patterns and beliefs.      Patient has reviewed and agreed to the above plan.      iRcarda Bhatia [facility]  June 28, 2023

## 2023-08-06 DIAGNOSIS — F51.04 PSYCHOPHYSIOLOGICAL INSOMNIA: ICD-10-CM

## 2023-08-07 RX ORDER — ZOLPIDEM TARTRATE 5 MG/1
TABLET ORAL
Qty: 30 TABLET | Refills: 0 | Status: SHIPPED | OUTPATIENT
Start: 2023-08-07 | End: 2023-08-31

## 2023-08-08 ENCOUNTER — OFFICE VISIT (OUTPATIENT)
Dept: NEUROLOGY | Facility: CLINIC | Age: 48
End: 2023-08-08
Attending: ORTHOPAEDIC SURGERY
Payer: COMMERCIAL

## 2023-08-08 DIAGNOSIS — G56.02 LEFT CARPAL TUNNEL SYNDROME: ICD-10-CM

## 2023-08-08 DIAGNOSIS — M79.641 PAIN IN BOTH HANDS: Primary | ICD-10-CM

## 2023-08-08 DIAGNOSIS — M79.642 PAIN IN BOTH HANDS: Primary | ICD-10-CM

## 2023-08-08 DIAGNOSIS — G56.01 RIGHT CARPAL TUNNEL SYNDROME: ICD-10-CM

## 2023-08-08 PROCEDURE — 95911 NRV CNDJ TEST 9-10 STUDIES: CPT | Performed by: PSYCHIATRY & NEUROLOGY

## 2023-08-08 PROCEDURE — 95886 MUSC TEST DONE W/N TEST COMP: CPT | Mod: RT | Performed by: PSYCHIATRY & NEUROLOGY

## 2023-08-08 NOTE — PROGRESS NOTES
[facility]  Electrodiagnostic Laboratory                 Department of Neurology                                                                                                         Test Date:  2023    Patient: Linette Beckwith : 1975 Physician: Reema Hernandez MD   Sex: Female AGE: 47 year Ref Phys: Dr AkhtarMajor   ID#: [id number]   Technician: Kristy Behling     History and Examination:  The patient for bilateral upper extremity EMG to evaluate for carpal tunnel syndrome.  Symptoms are worse on the right side compared to the left.    Techniques:  Motor conduction studies were done with surface recording electrodes. Sensory conduction studies were performed with surface electrodes, unless indicated otherwise by (n), designating the use of subdermal recording electrodes. Temperature was monitored and recorded throughout the study. Upper extremities were maintained at a temperature of 32 degrees Centigrade or higher.  EMG was done with a concentric needle electrode.     Results:  All nerve conduction studies (as indicated in the following tables) were within normal limits.      All examined muscles (as indicated in the following table) showed no evidence of electrical instability.        Interpretation:  This is a normal EMG.  There is no electrophysiologic evidence of median neuropathies at the wrist.      ___________________________  Reema Hernandez MD        Nerve Conduction Studies  Motor Sites      Latency Amplitude Neg. Amp Diff Segment Distance Velocity Neg. Dur Neg Area Diff Temperature Comment   Site (ms) Norm (mV) Norm %  cm m/s Norm ms %  C    Left Median (APB) Motor   Wrist 2.9  < 4.4 9.2  > 5.0  Wrist-APB 8   5.4  30.6    Elbow 6.7 - 8.9  > 5.0 -3.3 Elbow-Wrist 20 53  > 48 5.4 -5.5 30.6    Right Median (APB) Motor   Wrist 2.7  < 4.4 8.7  > 5.0  Wrist-APB 8   5.2  31.4    Elbow 6.6 - 7.8  > 5.0 -10.3 Elbow-Wrist 22 56  > 48 5.3 -13.4 31.4    Left Median/Ulnar  (Lumb-Dors Int II) Motor        Median (Lumb I)   Wrist 3.0 - 0.94 -  Wrist-Lumb I 10   7.2  29.4         Ulnar (Dorsal Int (manus))   Wrist 2.7 - 3.5 -  Wrist-Dorsal Int (manus) 10   4.4  29.6    Right Median/Ulnar (Lumb-Dors Int II) Motor        Median (Lumb I)   Wrist 2.8 - 1.70 -  Wrist-Lumb I 10   5.2  31.4         Ulnar (Dorsal Int (manus))   Wrist 2.9 - 3.2 -  Wrist-Dorsal Int (manus) 10   3.9  31.4    Left Ulnar (ADM) Motor   Wrist 2.1  < 3.5 8.3  > 5.0  Wrist-ADM 8   5.0  30.6    Bel Elbow 5.4 - 8.7 - 4.8 Bel Elbow-Wrist 20 61  > 48 4.8 1.90 30.6    Abv Elbow 7.0 - 8.1 - -6.9 Abv Elbow-Bel Elbow 9 56  > 48 5.2 -0.93 30.6    Right Ulnar (ADM) Motor   Wrist 2.3  < 3.5 10.5  > 5.0  Wrist-ADM 8   4.7  28.1    Bel Elbow 5.8 - 9.8 - -6.7 Bel Elbow-Wrist 20 57  > 48 4.4 -9.1 27.8    Abv Elbow 7.4 - 9.6 - -2.0 Abv Elbow-Bel Elbow 9 56  > 48 4.7 -0.44 27.5      Sensory Sites      Onset Lat Peak Lat Amp (O-P) Amp (P-P) Segment Distance Velocity Temperature Comment   Site ms ms  V Norm  V  cm m/s Norm  C    Left Median Sensory   Wrist-Dig II 1.93 2.7 33  > 10 42 Wrist-Dig II 14 73  > 48 29    Right Median Sensory   Wrist-Dig II 1.95 2.5 23  > 10 31 Wrist-Dig II 13 67  > 48 31.4    Left Median-Ulnar Palmar Sensory        Median   Palm-Wrist 1.23 1.68 100 - 138 Palm-Wrist 8 65 - 30.3         Ulnar   Palm-Wrist 1.23 1.65 20 - 55 Palm-Wrist 8 65 - 30.4    Right Median-Ulnar Palmar Sensory        Median   Palm-Wrist 1.30 1.80 61 - 74 Palm-Wrist 8 62 - 31.3         Ulnar   Palm-Wrist 1.25 1.85 22 - 21 Palm-Wrist 8 64 - 31.3    Right Radial Sensory   Forearm-Wrist 1.83 2.5 31  > 15 41 Forearm-Wrist 12 66 - 31.4    Left Ulnar Sensory   Wrist-Dig V 1.83 2.4 22  > 8 99 Wrist-Dig V 12.5 68  > 48 30.1    Right Ulnar Sensory   Wrist-Dig V 1.85 2.5 20  > 8 120 Wrist-Dig V 12.5 68  > 48 31.4      Inter-Nerve Comparisons     Nerve 1 Value 1 Nerve 2 Value 2 Parameter Result Normal   Sensory Sites   R Median Palm-Wrist 1.8 ms R Ulnar  Palm-Wrist 1.9 ms Peak Lat Diff 0.05 ms <0.40   L Median Palm-Wrist 1.7 ms L Ulnar Palm-Wrist 1.7 ms Peak Lat Diff 0.03 ms <0.40       Electromyography     Side Muscle Ins Act Fibs/PSW Fasc HF Amp Dur Poly Recrt Int Pat   Right Triceps Nml None Nml 0 Nml Nml 0 Nml Nml   Right Biceps Nml None Nml 0 Nml Nml 0 Nml Nml   Right Pronator Teres Nml None Nml 0 Nml Nml 0 Nml Nml   Right FDI Nml None Nml 0 Nml Nml 0 Nml Nml   Right APB Nml None Nml 0 Nml Nml 0 Nml Nml         NCS Waveforms:    Motor                    Sensory

## 2023-08-08 NOTE — LETTER
2023         RE: Radha Beckwith  [address] [facility] [address]        Dear Colleague,    Thank you for referring your patient, Radha Beckwith, to the [facility]. Please see a copy of my visit note below.                            [facility]  Electrodiagnostic Laboratory                 Department of Neurology                                                                                                         Test Date:  2023    Patient: Linette Beckwith : 1975 Physician: Reema Hernandez MD   Sex: Female AGE: 47 year Ref Phys: Dr Gonsalves   ID#: [id number]   Technician: Kristy Behling     History and Examination:  The patient for bilateral upper extremity EMG to evaluate for carpal tunnel syndrome.  Symptoms are worse on the right side compared to the left.    Techniques:  Motor conduction studies were done with surface recording electrodes. Sensory conduction studies were performed with surface electrodes, unless indicated otherwise by (n), designating the use of subdermal recording electrodes. Temperature was monitored and recorded throughout the study. Upper extremities were maintained at a temperature of 32 degrees Centigrade or higher.  EMG was done with a concentric needle electrode.     Results:  All nerve conduction studies (as indicated in the following tables) were within normal limits.      All examined muscles (as indicated in the following table) showed no evidence of electrical instability.        Interpretation:  This is a normal EMG.  There is no electrophysiologic evidence of median neuropathies at the wrist.      ___________________________  Reema Hernandez MD        Nerve Conduction Studies  Motor Sites      Latency Amplitude Neg. Amp Diff Segment Distance Velocity Neg. Dur Neg Area Diff Temperature Comment   Site (ms) Norm (mV) Norm %  cm m/s Norm ms %  C    Left Median (APB) Motor   Wrist 2.9  < 4.4 9.2  > 5.0   Wrist-APB 8   5.4  30.6    Elbow 6.7 - 8.9  > 5.0 -3.3 Elbow-Wrist 20 53  > 48 5.4 -5.5 30.6    Right Median (APB) Motor   Wrist 2.7  < 4.4 8.7  > 5.0  Wrist-APB 8   5.2  31.4    Elbow 6.6 - 7.8  > 5.0 -10.3 Elbow-Wrist 22 56  > 48 5.3 -13.4 31.4    Left Median/Ulnar (Lumb-Dors Int II) Motor        Median (Lumb I)   Wrist 3.0 - 0.94 -  Wrist-Lumb I 10   7.2  29.4         Ulnar (Dorsal Int (manus))   Wrist 2.7 - 3.5 -  Wrist-Dorsal Int (manus) 10   4.4  29.6    Right Median/Ulnar (Lumb-Dors Int II) Motor        Median (Lumb I)   Wrist 2.8 - 1.70 -  Wrist-Lumb I 10   5.2  31.4         Ulnar (Dorsal Int (manus))   Wrist 2.9 - 3.2 -  Wrist-Dorsal Int (manus) 10   3.9  31.4    Left Ulnar (ADM) Motor   Wrist 2.1  < 3.5 8.3  > 5.0  Wrist-ADM 8   5.0  30.6    Bel Elbow 5.4 - 8.7 - 4.8 Bel Elbow-Wrist 20 61  > 48 4.8 1.90 30.6    Abv Elbow 7.0 - 8.1 - -6.9 Abv Elbow-Bel Elbow 9 56  > 48 5.2 -0.93 30.6    Right Ulnar (ADM) Motor   Wrist 2.3  < 3.5 10.5  > 5.0  Wrist-ADM 8   4.7  28.1    Bel Elbow 5.8 - 9.8 - -6.7 Bel Elbow-Wrist 20 57  > 48 4.4 -9.1 27.8    Abv Elbow 7.4 - 9.6 - -2.0 Abv Elbow-Bel Elbow 9 56  > 48 4.7 -0.44 27.5      Sensory Sites      Onset Lat Peak Lat Amp (O-P) Amp (P-P) Segment Distance Velocity Temperature Comment   Site ms ms  V Norm  V  cm m/s Norm  C    Left Median Sensory   Wrist-Dig II 1.93 2.7 33  > 10 42 Wrist-Dig II 14 73  > 48 29    Right Median Sensory   Wrist-Dig II 1.95 2.5 23  > 10 31 Wrist-Dig II 13 67  > 48 31.4    Left Median-Ulnar Palmar Sensory        Median   Palm-Wrist 1.23 1.68 100 - 138 Palm-Wrist 8 65 - 30.3         Ulnar   Palm-Wrist 1.23 1.65 20 - 55 Palm-Wrist 8 65 - 30.4    Right Median-Ulnar Palmar Sensory        Median   Palm-Wrist 1.30 1.80 61 - 74 Palm-Wrist 8 62 - 31.3         Ulnar   Palm-Wrist 1.25 1.85 22 - 21 Palm-Wrist 8 64 - 31.3    Right Radial Sensory   Forearm-Wrist 1.83 2.5 31  > 15 41 Forearm-Wrist 12 66 - 31.4    Left Ulnar Sensory   Wrist-Dig V 1.83 2.4 22  >  8 99 Wrist-Dig V 12.5 68  > 48 30.1    Right Ulnar Sensory   Wrist-Dig V 1.85 2.5 20  > 8 120 Wrist-Dig V 12.5 68  > 48 31.4      Inter-Nerve Comparisons     Nerve 1 Value 1 Nerve 2 Value 2 Parameter Result Normal   Sensory Sites   R Median Palm-Wrist 1.8 ms R Ulnar Palm-Wrist 1.9 ms Peak Lat Diff 0.05 ms <0.40   L Median Palm-Wrist 1.7 ms L Ulnar Palm-Wrist 1.7 ms Peak Lat Diff 0.03 ms <0.40       Electromyography     Side Muscle Ins Act Fibs/PSW Fasc HF Amp Dur Poly Recrt Int Pat   Right Triceps Nml None Nml 0 Nml Nml 0 Nml Nml   Right Biceps Nml None Nml 0 Nml Nml 0 Nml Nml   Right Pronator Teres Nml None Nml 0 Nml Nml 0 Nml Nml   Right FDI Nml None Nml 0 Nml Nml 0 Nml Nml   Right APB Nml None Nml 0 Nml Nml 0 Nml Nml         NCS Waveforms:    Motor                    Sensory                                     Again, thank you for allowing me to participate in the care of your patient.        Sincerely,        Reema Hernandez MD

## 2023-08-09 ENCOUNTER — VIRTUAL VISIT (OUTPATIENT)
Dept: PSYCHOLOGY | Facility: CLINIC | Age: 48
End: 2023-08-09
Payer: COMMERCIAL

## 2023-08-09 DIAGNOSIS — F33.1 MODERATE EPISODE OF RECURRENT MAJOR DEPRESSIVE DISORDER (H): Primary | ICD-10-CM

## 2023-08-09 DIAGNOSIS — F41.1 GENERALIZED ANXIETY DISORDER: ICD-10-CM

## 2023-08-09 PROCEDURE — 90834 PSYTX W PT 45 MINUTES: CPT | Mod: VID | Performed by: COUNSELOR

## 2023-08-09 ASSESSMENT — PATIENT HEALTH QUESTIONNAIRE - PHQ9
SUM OF ALL RESPONSES TO PHQ QUESTIONS 1-9: 22
SUM OF ALL RESPONSES TO PHQ QUESTIONS 1-9: 22
10. IF YOU CHECKED OFF ANY PROBLEMS, HOW DIFFICULT HAVE THESE PROBLEMS MADE IT FOR YOU TO DO YOUR WORK, TAKE CARE OF THINGS AT HOME, OR GET ALONG WITH OTHER PEOPLE: EXTREMELY DIFFICULT

## 2023-08-09 NOTE — PROGRESS NOTES
Answers submitted by the patient for this visit:  Patient Health Questionnaire (Submitted on 8/9/2023)  If you checked off any problems, how difficult have these problems made it for you to do your work, take care of things at home, or get along with other people?: Extremely difficult  PHQ9 TOTAL SCORE: 22            [facility]                                     Progress Note    Patient Name: Radha Beckwith  Date: 8/9/23         Service Type: Individual      Session Start Time: 2:00pm Session End Time: 2:50pm     Session Length: 50    Session #: 5    Attendees: Client    Service Modality:  Video Visit:      Provider verified identity through the following two step process.  Patient provided:  Patient is known previously to provider    Telemedicine Visit: The patient's condition can be safely assessed and treated via synchronous audio and visual telemedicine encounter.      Reason for Telemedicine Visit: Patient convenience (e.g. access to timely appointments / distance to available provider)    Originating Site (Patient Location): Patient's home    Distant Site (Provider Location): Provider Remote Setting- Home Office    Consent:  The patient/guardian has verbally consented to: the potential risks and benefits of telemedicine (video visit) versus in person care; bill my insurance or make self-payment for services provided; and responsibility for payment of non-covered services.     Patient would like the video invitation sent by:  My Chart    Mode of Communication:  Video Conference via [employer]    Distant Location (Provider):  Off-site    As the provider I attest to compliance with applicable laws and regulations related to telemedicine.    DATA  Interactive Complexity: No  Crisis: No        Progress Since Last Session (Related to Symptoms / Goals / Homework):   Symptoms: No change .    Homework: Completed in session      Episode of Care Goals: Minimal progress - ACTION (Actively working  towards change); Intervened by reinforcing change plan / affirming steps taken     Current / Ongoing Stressors and Concerns:   Client stated she's not feeling well today.    Might not need surgery on her wrists.           Treatment Objective(s) Addressed in This Session:   Increase interest, engagement, and pleasure in doing things  Feel less tired and more energy during the day        Intervention:   Talked about past experiences she's been thinking about regarding her son. Processed through some feelings she continues to experience when thinking about these.     Assessments completed prior to visit:  The following assessments were completed by patient for this visit:  PHQ9:       3/27/2023     2:53 PM 4/17/2023    12:41 PM 6/12/2023    10:17 AM 6/19/2023    12:20 PM 6/28/2023    11:26 AM 8/1/2023    12:12 PM 8/9/2023    12:54 PM   PHQ-9 SCORE   PHQ-9 Total Score MyChart 16 (Moderately severe depression) 18 (Moderately severe depression) 20 (Severe depression) 19 (Moderately severe depression) 19 (Moderately severe depression) 22 (Severe depression) 22 (Severe depression)   PHQ-9 Total Score 16 18    18 20 19 19 22 22     GAD7:       2/14/2023    11:38 AM 3/25/2023     3:01 PM 4/17/2023    12:45 PM 4/26/2023     1:00 PM 5/10/2023    12:51 PM 6/12/2023    10:19 AM 8/1/2023    12:15 PM   BO-7 SCORE   Total Score 17 (severe anxiety) 16 (severe anxiety) 15 (severe anxiety)  17 (severe anxiety) 19 (severe anxiety) 19 (severe anxiety)   Total Score 17    17    17    17    17 16 15    15 18 17 19    19 19     PROMIS 10-Global Health (all questions and answers displayed):       10/26/2022    12:50 PM 1/25/2023    12:48 PM 2/7/2023    12:57 PM 4/17/2023     2:12 PM 6/25/2023     2:45 PM   PROMIS 10   In general, would you say your health is:    Poor Poor   In general, would you say your quality of life is:    Poor Fair   In general, how would you rate your physical health?    Poor Poor   In general, how would you rate your  mental health, including your mood and your ability to think?    Fair Fair   In general, how would you rate your satisfaction with your social activities and relationships?    Fair Poor   In general, please rate how well you carry out your usual social activities and roles    Fair Poor   To what extent are you able to carry out your everyday physical activities such as walking, climbing stairs, carrying groceries, or moving a chair?    A little Moderately   In the past 7 days, how often have you been bothered by emotional problems such as feeling anxious, depressed, or irritable?    Always Always   In the past 7 days, how would you rate your fatigue on average?    Moderate Severe   In the past 7 days, how would you rate your pain on average, where 0 means no pain, and 10 means worst imaginable pain?    8 7   In general, would you say your health is:    1    1 1   In general, would you say your quality of life is:    1    1 2   In general, how would you rate your physical health?    1    1 1   In general, how would you rate your mental health, including your mood and your ability to think?    2    2 2   In general, how would you rate your satisfaction with your social activities and relationships?    2    2 1   In general, please rate how well you carry out your usual social activities and roles. (This includes activities at home, at work and in your community, and responsibilities as a parent, child, spouse, employee, friend, etc.)    2    2 1   To what extent are you able to carry out your everyday physical activities such as walking, climbing stairs, carrying groceries, or moving a chair?    2    2 3   In the past 7 days, how often have you been bothered by emotional problems such as feeling anxious, depressed, or irritable?    5    5 5   In the past 7 days, how would you rate your fatigue on average?    3    3 4   In the past 7 days, how would you rate your pain on average, where 0 means no pain, and 10 means  worst imaginable pain?    8    8 7   Global Mental Health Score    6    6 6   Global Physical Health Score    8    8 8   PROMIS TOTAL - SUBSCORES    14    14 14       Information is confidential and restricted. Go to Review Flowsheets to unlock data.    Multiple values from one day are sorted in reverse-chronological order     [place] Suicide Severity Rating Scale (Lifetime/Recent)      10/4/2022    11:00 AM   [place] Suicide Severity Rating (Lifetime/Recent)   Q1 Wished to be Dead (Past Month) no   Q2 Suicidal Thoughts (Past Month) no   Q3 Suicidal Thought Method no   Q4 Suicidal Intent without Specific Plan no   Q5 Suicide Intent with Specific Plan yes   Q6 Suicide Behavior (Lifetime) yes   Within the Past 3 Months? no   Level of Risk per Screen high risk         ASSESSMENT: Current Emotional / Mental Status (status of significant symptoms):   Risk status (Self / Other harm or suicidal ideation)   Patient denies current fears or concerns for personal safety.   Patient denies current or recent suicidal ideation or behaviors.   Patient denies current or recent homicidal ideation or behaviors.   Patient denies current or recent self injurious behavior or ideation.   Patient denies other safety concerns.   Patient reports there has been no change in risk factors since their last session.     Patient reports there has been no change in protective factors since their last session.     Recommended that patient call 911 or go to the local ED should there be a change in any of these risk factors.     Appearance:   Appropriate    Eye Contact:   Good    Psychomotor Behavior: Normal    Attitude:   Cooperative    Orientation:   All   Speech    Rate / Production: Normal/ Responsive Normal     Volume:  Normal    Mood:    Normal   Affect:    Appropriate    Thought Content:  Clear    Thought Form:  Coherent  Logical    Insight:    Good      Medication Review:   No changes to current psychiatric medication(s)     Medication  Compliance:   Yes     Changes in Health Issues:   None reported     Chemical Use Review:   Substance Use: Chemical use reviewed, no active concerns identified      Tobacco Use: No change in amount of tobacco use since last session.  Patient declined discussion at this time    Diagnosis:  1. Moderate episode of recurrent major depressive disorder (H)    2. Generalized anxiety disorder        Collateral Reports Completed:   Not Applicable    PLAN: (Patient Tasks / Therapist Tasks / Other)  Continue to work on stress reduction skills.         Ricarda , [facility]                                                         ______________________________________________________________________    Individual Treatment Plan    Patient's Name: Radha eBckwith  YOB: 1975    Date of Creation: 6/28/23  Date Treatment Plan Last Reviewed/Revised: 6/28/23    DSM5 Diagnoses: 296.32 (F33.1) Major Depressive Disorder, Recurrent Episode, Moderate _  Psychosocial / Contextual Factors: living with boyfriend, memory issues  PROMIS (reviewed every 90 days):     Referral / Collaboration:  Referral to another professional/service is not indicated at this time..    Anticipated number of session for this episode of care: 9-12 sessions  Anticipation frequency of session:  as needed  Anticipated Duration of each session: 38-52 minutes  Treatment plan will be reviewed in 90 days or when goals have been changed.       MeasurableTreatment Goal(s) related to diagnosis / functional impairment(s)  Goal 1: Patient will increase communication skills with family members.    Objective #A (Patient Action)    Patient will learn & utilize at least 2 assertive communication skills weekly.  Status: New - Date: 6/28/23      Intervention(s)  Therapist will teach emotional regulation skills. distress tolerance skills, interpersonal effectiveness skills, emotion regluation skills, mindfulness skills, radical acceptance. Therapist will teach  client how to ID body cues for anxiety, anxiety reduction techniques, how to ID triggers for depression and anxiety- decrease reactivity/eliminate, lifestyle changes to reduce depression and anxiety, communication skills, explore cognitive beliefs and help client to develop healthy cognitive patterns and beliefs.    Objective #B  Patient will use thought-stopping strategy daily to reduce intrusive thoughts.  Status: New - Date: 6/28/23      Intervention(s)  Therapist will teach emotional regulation skills. distress tolerance skills, interpersonal effectiveness skills, emotion regluation skills, mindfulness skills, radical acceptance. Therapist will teach client how to ID body cues for anxiety, anxiety reduction techniques, how to ID triggers for depression and anxiety- decrease reactivity/eliminate, lifestyle changes to reduce depression and anxiety, communication skills, explore cognitive beliefs and help client to develop healthy cognitive patterns and beliefs.      Goal 2: Patient will decrease depression symptoms.      Objective #A (Patient Action)    Status: New - Date: 6/28/23      Patient will Increase interest, engagement, and pleasure in doing things  Decrease frequency and intensity of feeling down, depressed, hopeless  Improve quantity and quality of night time sleep / decrease daytime naps  Feel less tired and more energy during the day   Improve diet, appetite, mindful eating, and / or meal planning  Identify negative self-talk and behaviors: challenge core beliefs, myths, and actions  Improve concentration, focus, and mindfulness in daily activities   Feel less fidgety, restless or slow in daily activities / interpersonal interactions.    Intervention(s)  Therapist will teach emotional regulation skills. distress tolerance skills, interpersonal effectiveness skills, emotion regluation skills, mindfulness skills, radical acceptance. Therapist will teach client how to ID body cues for anxiety, anxiety  reduction techniques, how to ID triggers for depression and anxiety- decrease reactivity/eliminate, lifestyle changes to reduce depression and anxiety, communication skills, explore cognitive beliefs and help client to develop healthy cognitive patterns and beliefs.    Objective #B  Patient will identify three distraction and diversion activities and use those activities to decrease level of anxiety  .    Status: New - Date: 6/28/23      Intervention(s)  Therapist will teach emotional regulation skills. distress tolerance skills, interpersonal effectiveness skills, emotion regluation skills, mindfulness skills, radical acceptance. Therapist will teach client how to ID body cues for anxiety, anxiety reduction techniques, how to ID triggers for depression and anxiety- decrease reactivity/eliminate, lifestyle changes to reduce depression and anxiety, communication skills, explore cognitive beliefs and help client to develop healthy cognitive patterns and beliefs.      Patient has reviewed and agreed to the above plan.      ERIK Keller  June 28, 2023

## 2023-08-10 ENCOUNTER — VIRTUAL VISIT (OUTPATIENT)
Dept: PSYCHIATRY | Facility: CLINIC | Age: 48
End: 2023-08-10
Payer: COMMERCIAL

## 2023-08-10 DIAGNOSIS — G47.01 INSOMNIA DUE TO MEDICAL CONDITION: Primary | ICD-10-CM

## 2023-08-10 DIAGNOSIS — F33.1 MODERATE EPISODE OF RECURRENT MAJOR DEPRESSIVE DISORDER (H): ICD-10-CM

## 2023-08-10 DIAGNOSIS — F13.20 BENZODIAZEPINE DEPENDENCE (H): ICD-10-CM

## 2023-08-10 DIAGNOSIS — F41.1 GAD (GENERALIZED ANXIETY DISORDER): ICD-10-CM

## 2023-08-10 PROCEDURE — 99215 OFFICE O/P EST HI 40 MIN: CPT | Mod: 95 | Performed by: STUDENT IN AN ORGANIZED HEALTH CARE EDUCATION/TRAINING PROGRAM

## 2023-08-10 RX ORDER — TRAZODONE HYDROCHLORIDE 50 MG/1
50-100 TABLET, FILM COATED ORAL
Qty: 120 TABLET | Refills: 1 | Status: SHIPPED | OUTPATIENT
Start: 2023-08-10 | End: 2024-03-15

## 2023-08-10 ASSESSMENT — PATIENT HEALTH QUESTIONNAIRE - PHQ9
10. IF YOU CHECKED OFF ANY PROBLEMS, HOW DIFFICULT HAVE THESE PROBLEMS MADE IT FOR YOU TO DO YOUR WORK, TAKE CARE OF THINGS AT HOME, OR GET ALONG WITH OTHER PEOPLE: EXTREMELY DIFFICULT
SUM OF ALL RESPONSES TO PHQ QUESTIONS 1-9: 23
SUM OF ALL RESPONSES TO PHQ QUESTIONS 1-9: 23

## 2023-08-10 NOTE — PROGRESS NOTES
Virtual Visit Details    Type of service:  Video Visit   Video Start Time: 1:31 PM  Video End Time:2:01 PM    Originating Location (pt. Location): Home    Distant Location (provider location):  Off-site  Platform used for Video Visit: Insight Plus      ADMINISTRATIVE BILLING:    Time spent interviewing patient, reviewing referral documents, obtaining and reviewing outside records, communication with other health specialists, and preparing this report on today's date  Total time: 42 mins      [facility] PSYCHIATRIC SERVICE FOLLOW UP     Name:  Radha Beckwith  : 1975     Telemedicine Visit: The patient's condition can be safely assessed and treated via synchronous audio and visual telemedicine encounter.      Reason for Telemedicine Visit: COVID 19 pandemic and the social and physical recommendations by the [facility] and [facility].      Consent:  The patient/guardian has verbally consented to: the potential risks and benefits of telemedicine (video visit or phone) versus in person care; bill my insurance or make self-payment for services provided; and responsibility for payment of non-covered services.     As the provider I attest to compliance with applicable laws and regulations related to telemedicine.    IDENTIFICATION     Patient is a 47 year old year old White, female  who presents for follow-up medication management with [facility].  Patient was initially referred by their PCP. Patient attended the session alone.   The [facility] psychiatry providers act as a specialty service for Primary Care Providers in the [facility] who seek to optimize medications for unstable patients.  Once medications have been optimized, [facility] providers discharge the patient back to the referring Primary Care Provider for ongoing medication management.  This type of system allows [facility] to serve a high volume of patients.      Patient care team: Patient Care Team:  Gisselle Linn NP as PCP - General (Nurse Practitioner -  "Family)  Gisselle Linn NP as Assigned PCP  Gisselle Linn NP as Referring Physician (Nurse Practitioner - Family)  Jose Haas MD as MD (Neurology)  Micha Hough MD as Assigned Surgical Provider  Madelaine Arredondo APRN CNP as Nurse Practitioner  Jenny Landrum APRN CNP as Nurse Practitioner  Whitney Weinberg NP as Assigned Neuroscience Provider  Reema Hernandez MD as MD (Neurology)  Yohan Gonsalves MD as Assigned Musculoskeletal Provider    INTERIM HISTORY   Pt was last seen in [facility] for intake on 18 APR 23. At that time, the plan included   -decrease QUETIAPINE to 150mg x 2 weeks then 100mg x 2 weeks then 50mg x 2 weeks then 25mg x 2 weeks then stop  -continue DULOXETINE 30mg nightly - rx by PCP  -continue ALPRAZOLAM 1mg BID - rx by PCP  -continue AMBIEN 5mg at bedtime - rx by PCP.    Interim pt communication:  none    Available records were reviewed prior to visit.    HISTORY OF PRESENT ILLNESS     Currently: Pt says not much is new in her world except that she had all her teeth pulled due to poor oral health from dry mouth. \"Now I'm healing. I decided to heal first before I go get dentures.\" Pt's mood/anxiety have been \"just about the same.\" She has a new therapist who she really connects with and feels good about the work they are doing on her mood/anxiety.    Pt says sleep is poor. She says a lot of her sleep problems have to do with pain. She often wakes earlier than desired due to pain. Some nights she doesn't feel like she slept at all. Other nights she estimates sleeping 2-4 hours.    Pt reviewed the record prior to our visit. Pt wonders why PTSD is not her record. She also would like to note that the deaths in her family were not expected. She says a few deaths in her family were expected butt hat she experienced \"major trauma\" when she witnessed staff perform CPR on her uncle.   Pt wants to know if PTSD should be added to her record. She says she's never talked to anyone " "about it. We reviewed criteria for PTSD today.     Suicidal ideation:  No   PHQ9 score is 23 indicating severe depression.   GAD7 score is  is 19 indicating severe anxiety.    Appetite: \"With the teeth and everything it's been not that great. If I didn't have to eat I wouldn't.\" Clothes have been fitting tighter than usual.     Medications: Pt is taking duloxetine twice daily now. \"It's definitely helped with the pain on my hips and in my legs.\"     Medical: Chronic neck and back pain, seeing PT. Recent EMG with neuro on wrists and arms.     SUBSTANCE USE HISTORY    Tobacco use: 1/4-1/2 PPD  Caffeine:  Yes  2 cups/day of coffee  Current alcohol:  Every few weeks  Current substance use: not reviewed  rx suboxone for OUD in remission, rx benzos daily  Past use alcohol/substance use: denies    MEDICATIONS                                                                                              Current medications reviewed today and are noted below.   Current psychotropic medications:   DULOXETINE 30mg nightly - rx by PCP - 30mg BID  ALPRAZOLAM 1mg BID - rx by PCP - taking 0.5mg BID  AMBIEN 5mg at bedtime - rx by PCP - taking nightly    Pertinent Medications  Ropinerole   Suboxone 2-0.5mg   Cyclobenzaprine      Past psychotropic medications:  \"I've tried every depression medication. I won't go on it again. It's done nothing for me.\"  Amitriptyline  Bupropion  Buspirone  Citalopram  Clonidine  Fluoxetine  Gabapentin  Lorazepam  Sertraline  Trazodone  Chantix  Hydroxyzine     Supplements:   See below      8/7/23 Zolpidem 5mg #30  7/25/23 Suboxone 2/0.5 #30  7/17/23 Alprazolam 0.5mg #60  7/6/23 Zolpidem 5mg #30    Current Outpatient Medications   Medication Sig    acetaminophen (TYLENOL) 500 MG tablet Take 1,000 mg by mouth every 6 hours as needed for mild pain    ALPRAZolam (XANAX) 0.5 MG tablet TAKE 1 TABLET (0.5 MG) BY MOUTH TWO TIMES A DAY    baclofen (LIORESAL) 20 MG tablet Take 1 tablet (20 mg) by mouth 2 times " daily    bisacodyl (DULCOLAX) 5 MG EC tablet Take 1 tablet (5 mg) by mouth every other day Take every other day. If no bowel movement for 2 or more days, take 2 tablets of bisacodyl (10 mg)    buprenorphine HCl-naloxone HCl (SUBOXONE) 2-0.5 MG per film Place 0.5 Film under the tongue daily     cetirizine (ZYRTEC) 10 MG tablet TAKE ONE TABLET BY MOUTH EVERY MORNING (Patient taking differently: Take 10 mg by mouth daily)    chlorhexidine (PERIDEX) 0.12 % solution Swish and spit 30 mLs in mouth 2 times daily    cyclobenzaprine (FLEXERIL) 5 MG tablet TAKE ONE TO TWO TABLETS BY MOUTH THREE TIMES A DAY AS NEEDED FOR MUSCLE SPASMS    DULoxetine (CYMBALTA) 30 MG capsule TAKE ONE CAPSULE BY MOUTH TWICE A DAY    famotidine (PEPCID) 40 MG tablet Take 40 mg by mouth 2 times daily as needed for heartburn    lactulose (CHRONULAC) 10 GM/15ML solution Take 15 mLs (10 g) by mouth 3 times daily as needed for constipation (as needed for severe constipaiton, no BM for more than 3 days and feeling constipated)    ondansetron (ZOFRAN ODT) 4 MG ODT tab DISSOLVE ONE TABLET ON TONGUE EVERY SIX HOURS AS NEEDED FOR NAUSEA    pantoprazole (PROTONIX) 40 MG EC tablet Take 1 tablet (40 mg) by mouth 2 times daily (before meals)    polyethylene glycol (MIRALAX) 17 GM/Dose powder Take 17 g by mouth daily    rOPINIRole (REQUIP) 1 MG tablet TAKE ONE TABLET BY MOUTH EVERY NIGHT AT BEDTIME    simvastatin (ZOCOR) 10 MG tablet TAKE ONE TABLET BY MOUTH EVERY NIGHT AT BEDTIME (Patient taking differently: Take 10 mg by mouth At Bedtime)    zolpidem (AMBIEN) 5 MG tablet TAKE ONE TABLET (5 MG) BY MOUTH NIGHTLY AS NEEDED FOR SLEEP     No current facility-administered medications for this visit.        VITALS   LMP  (LMP Unknown)     Pulse Readings from Last 5 Encounters:   06/20/23 95   05/23/23 (!) 121   05/16/23 95   04/26/23 102   02/27/23 85     Wt Readings from Last 5 Encounters:   06/20/23 78.5 kg (173 lb)   05/23/23 83 kg (182 lb 14.4 oz)   02/27/23 83.9  kg (185 lb)   01/06/23 78 kg (172 lb)   11/07/22 78 kg (172 lb)     BP Readings from Last 5 Encounters:   06/20/23 (!) 160/100   05/23/23 (!) 169/112   05/16/23 (!) 143/92   04/26/23 (!) 153/91   02/27/23 (!) 137/96       LABS & IMAGING                                                                                                                Recent available labs reviewed today.    Recent Labs   Lab Test 02/27/23  1701 06/29/22  0958 07/06/21  1153   CR 0.67 0.68 0.75   GFRESTIMATED >90 >90 >90     Recent Labs   Lab Test 07/06/21  1153 03/02/21  1403   AST 33 22   ALT 34 38   ALKPHOS 95 111     Recent Labs   Lab Test 06/29/22  0958 03/02/21  1403 04/03/19  1334 11/30/18  1439   TSH 3.51 2.42 1.91 1.88       ALLERGY & IMMUNIZATIONS       Allergies   Allergen Reactions    Ergotamine-Caffeine      12-            GI problems-    Seasonal Allergies     Sumatriptan      vomits after giving herself a shot    Compazine Anxiety    Droperidol Anxiety    Nubain [Nalbuphine Hcl] Anxiety    Prochlorperazine Palpitations     Uncontrolled movement       MEDICAL & SURGICAL HISTORY    Reviewed past medical and surgical history today.   Pregnant - Yes and No: No     Past Medical History:   Diagnosis Date    Abdominal pain, right lower quadrant 03/09/2008    Admit. Discharged 03/10/08    Anxiety attack 07/31/2015    Atypical chest pain 06/23/2015    De Quervain's disease (tenosynovitis)     Dehydration     Gastric ulcer 07/31/2015    GERD (gastroesophageal reflux disease) 07/28/2010    Takes omeprazole and metoclopramide     Ingrowing nail 01/09/2014    Migraines     Opioid dependence in remission (H) 08/02/2015    Other and unspecified ovarian cyst     Papanicolaou smear of cervix with low grade squamous intraepithelial lesion (LGSIL) 07/07/2017       MENTAL STATUS EXAM:     Alertness: alert  and oriented  Appearance: adequately groomed  Behavior/Demeanor: cooperative, pleasant and calm, with good eye contact   Speech:  "WNL; missing dentition  Language: WNL  Psychomotor: normal or unremarkable  Mood: \"about the same\"  Affect: appropriate; was congruent to mood; was congruent to content  Thought Process/Associations: linear, logical  Thought Content:  Reports none;  Denies suicidal ideation, violent ideation and delusions  Perception:  Reports none;  Denies auditory hallucinations and visual hallucinations  Insight: intact  Judgment: intact  Cognition: does  appear grossly intact; formal cognitive testing was not done  Gait and Station: [facility]     RISK AND PROTECTIVE FACTORS     Static Risk Factors: history of MH diagnoses and/or treatment and history of hospitalization  Firearms/Weapons Access: No: Patient denies  Dynamic Risk Factors: substance use, anxiety, feelings of guilt, lack of social support, chronic pain, financial problems and mental health stigma     Protective Factors: hope for the future, compliance with medication, future oriented, restricted access to means, resilience, access to care as needed, reasons for living and displaying help seeking behavior     SAFETY ASSESSMENT      Based on review of above risk and protective factors and today's exam, pt is at low acute risk of harm to self or others and chronic elevated risk due to history and risk factors. She does not meet criteria for a 72 hr hold and remains appropriate for ongoing outpatient care. The patient convincingly denies suicidality today. There was no deceit detected, and the patient presented in a manner that was believable. Local community safety resources printed and reviewed for patient to use if needed.     Recommended that patient call 911 or go to the local ED should there be a change in any of these risk factors.     DSM 5 DIAGNOSIS      296.32 (F33.1) Major Depressive Disorder, Recurrent Episode, Moderate _  300.02 (F41.1) Generalized Anxiety Disorder  Substance-Related & Addictive Disorders Sedative, Hypnotic or Anxiolytic Related Disorder, " dependence   304.10 (F13.20) Moderate  Opioid dependence in remission     DIFFERENTIAL DIAGNOSIS: chronic trauma     Medical comorbidities impacting or contributing to clinical picture: Chronic pain, back, wrists  Known issue that I take into account for their medical decisions, no current exacerbations or new concerns.     ASSESSMENT AND PLAN       ASSESSMENT:  Radha Beckwith is a 47 year old White, female who presents for initial visit with [facility] ([facility]) for medication management.   18 Apr 23: Pt with a history of chronic pain, on MAT, anxiety with BZD dependence, and depression who presents for consultation for anxiety/depression and current medications. She says antidepressant trials have always gone poorly and she will never take another antidepressant again. She doesn't like the risks of TD or death associated with SGA so she doesn't want to take one. She perceives current medicines are somewhat helpful for mood/anxiety, though sx generally persist. We discussed quetiapine as an SGA and she would like to discontinue this right away. She understands quetiapine is more likely to have caused her 40# gain than duloxetine. She perceives duloxetine is helpful for her nerve pain but never really tried 60mg or more for an extended period of time due to concerns of weight gain. I counseled pt on general bzd risks and bzd risks in addition to her other CNS depressant medications. I advised a taper and D/C would be the safest option, though it would take time. She is interested in tapering off quetiapine and revisiting trazodone for sleep, which I think is reasonable and has a better SE profile. She will RTC in 4 weeks or sooner PRN. She denies safety concerns today.   Today 10 Aug 23: Pt with a history of chronic pain, on MAT, anxiety with BZD dependence, and depression who returns for consultation of insomnia secondary to pain. We discussed her hx of medication trials for insomnia  and reviewed recommendation from April to include re-visiting trazodone due to its sedating effects without causing appetite increase. Reviewed r/b/se today and pt agrees to trial of trazodone. Reviewed current medicines and recommended she continue to reduce alprazolam due to my concerns of lack of long term efficacy for anxiety as well as long term impacts on physical and mental health. She understands. Pt denies safety concerns and will return to PCP after today's consultation.      TREATMENT PLAN: Medication side effects and alternatives reviewed. Health promotion activities recommended and reviewed. All questions addressed. Education and counseling completed regarding risks and benefits of medications and psychotherapy options. [facility] Service model reviewed today. Recommend therapy for additional support. Safety plan reviewed as indicated.     MEDICATIONS:   -start TRAZODONE (DESYREL) 50-100mg nightly if needed for sleep  -continue DULOXETINE 30mg twice daily - rx by PCP  -continue ALPRAZOLAM 0.5mg twice daily - rx by PCP  -continue AMBIEN 5mg at bedtime - rx by PCP    LABS/RADS:   -reviewed 2/27/23 labs    PATIENT STATUS:  [facility] MD/DO/NP/PA providers offer care a specialty service for Primary Care Providers in the [facility] that seek to optimize psychotropic medications for unstable patients.  Once medications have been optimized, our providers discharge the patient back to the referring Primary Care Provider for ongoing medication management.  This type of system allows our providers to serve a high volume of patients.   -Pt has been stabilized on current medication regimen and will be referred back to PCP for ongoing medication refills. I will provide 90d supply of trazodone 50-100mg at bedtime PRN at today's final appointment.    PSYCHOSOCIAL:   -continue ind therapy  -Follow up with primary care provider as planned or for acute medical concerns.    PSYCHOEDUCATION:  Medication  side effects and alternatives reviewed. Health promotion activities recommended and reviewed today. All questions addressed. Education and counseling completed regarding risks and benefits of medications and psychotherapy options.  Consent provided by patient/guardian  Call the psychiatric nurse line with medication questions or concerns at [phone].  Chujianhart may be used to communicate with your provider, but this is not intended to be used for emergencies.  BLACK BOX WARNING: Discussed the Food and Drug Administration (FDA) requires that all antidepressants carry a warning that some children, adolescents and young adults may be at increased risk of suicide when taking antidepressants. Anyone taking an antidepressant should be watched closely for worsening depression or unusual behavior especially in the first few weeks after starting an SSRI. Keep in mind, antidepressants are more likely to reduce suicide risk in the long run by improving mood.   BENZODIAZEPINE:  discussion on how benzos work and the need to use them short term due to potential of anxiety getting.  This is a controlled substance with risk for abuse, need to keep in a safe keep place and cannot replace lost scripts.    HARM REDUCTION:  Discussions regarding effects of mood altering substances, alcohol and cannabis, on mood and that approach is harm reduction, will continue to prescribe meds as they work to cut back use.    Medlineplus.gov is information for patients.  It is run by the National Library of Medicine and it contains information about all disorders, diseases and all medications.      FOLLOW-UP: Follow up with PCP within 90 days    Continue all other treatments (including medications) per primary care provider and/or specialists.   To schedule individual or family therapy, call [facility] at [phone].   Follow up with primary care provider as planned or for acute medical concerns.  Call the psychiatric nurse line  with medication questions or concerns at [phone] or [phone].  MyChart may be used to communicate with your care team, but this is not intended to be used for emergencies.    CRISIS RESOURCES:    Present to the Emergency Department as needed or call after hours crisis line at [phone] or [phone].   [place] Crisis Text Line: Text [place] to 577058.  Suicide LifeLine Chat: suicidepreNodePrimeline.org/chat/.  National Suicide Prevention Lifeline: 194.708.5194 (TTY: 581.891.1259). Call anytime for help.  (www.suicidepreventionlifeline.org)  National [place] on Mental Illness (www.anushka.org): 449.303.8735 or 256-126-3888.  Mental Health Association (www.mentalhealth.org): 641.554.9120 or 576-719-8747.      Signed:   Ronni Weinberg DNP, PMHNP-BC  [facility] ([facility])

## 2023-08-10 NOTE — PATIENT INSTRUCTIONS
Moving from: Collaborative Care Psychiatry Service ([facility])    Moving to: Primary Care        Congratulations - you're ready to graduate from the [facility] program. Because of your hard work, you've achieved better mental health. Keep it up!      MEDICATION LIST:  -start TRAZODONE (DESYREL) 50-100mg nightly if needed for sleep  -continue DULOXETINE 30mg twice daily - rx by PCP  -continue ALPRAZOLAM 0.5mg twice daily - rx by PCP  -continue AMBIEN 5mg at bedtime - rx by PCP      We will tell your family clinic that you've completed our program. This way, they can help you build on the progress you've made in your mental health.         Here's what happens next:    Within the next 3 months: Please set up a visit with your family clinic (primary care provider). Ask for a mental health check-in.     Stay on your current medicines--don't change your doses. Your symptoms could get worse if you quickly stop or decrease your medicine.    We've refilled your mental health medicines for the next 3 months (90 days). Future refills or dose changes should come from your family clinic.    If you're in therapy, keep it up! If you're not but would like to start, ask your family clinic for a referral to therapy. Or call [facility] ([phone]) to set up a visit with a therapist.        It's been a pleasure to work with you! If you and your clinic decide that you should return to [facility] in the future, we remain ready to serve you. Ask your clinic for a new referral if needed.

## 2023-08-10 NOTE — Clinical Note
Linette Iverson agreed to revisit trazodone for her insomnia. Unfortunately, her insomnia is secondary to pain and most medicines will either sedate her without actually helping the pain or will not be sedating enough to help her sleep through it. She doesn't want to be on SGAs because of the weight gain. She is engaged with her therapist and hopeful about that work, which I think is awesome.  I did recommend she continue to work on tapering alprazolam with you given it's poor long term effect on anxiety and risk of rebound anxiety. Even something like decreasing by 0.25mg each month would be ok, or switching to the equivalent diazepam dose and then tapering so she has a longer acting medicine would be reasonable.  She will be returning to you for refills. Thank you for the consultation! JEANNIE Lehman [facility]

## 2023-08-10 NOTE — NURSING NOTE
Is the patient currently in the state of [place]? YES    Visit mode:VIDEO    If the visit is dropped, the patient can be reconnected by: VIDEO VISIT: Text to cell phone: [phone]    Will anyone else be joining the visit? NO      How would you like to obtain your AVS? MyChart    Are changes needed to the allergy or medication list? NO    Patient reviewed allerges and med list during e-checkin.  Patient mentioned dosage on Xanax was decreased.    Reason for visit: RECHECK    [facility]

## 2023-08-14 ENCOUNTER — VIRTUAL VISIT (OUTPATIENT)
Dept: PSYCHOLOGY | Facility: CLINIC | Age: 48
End: 2023-08-14
Payer: COMMERCIAL

## 2023-08-14 DIAGNOSIS — F33.1 MODERATE EPISODE OF RECURRENT MAJOR DEPRESSIVE DISORDER (H): Primary | ICD-10-CM

## 2023-08-14 DIAGNOSIS — F41.1 GENERALIZED ANXIETY DISORDER: ICD-10-CM

## 2023-08-14 PROCEDURE — 90834 PSYTX W PT 45 MINUTES: CPT | Mod: VID | Performed by: COUNSELOR

## 2023-08-14 NOTE — PROGRESS NOTES
Answers submitted by the patient for this visit:  Patient Health Questionnaire (Submitted on 8/9/2023)  If you checked off any problems, how difficult have these problems made it for you to do your work, take care of things at home, or get along with other people?: Extremely difficult  PHQ9 TOTAL SCORE: 22            [facility]                                     Progress Note    Patient Name: Radha Beckwith  Date: 8/14/23         Service Type: Individual      Session Start Time: 1:35pm Session End Time: 2:25pm     Session Length: 50    Session #: 6    Attendees: Client    Service Modality:  Video Visit:      Provider verified identity through the following two step process.  Patient provided:  Patient is known previously to provider    Telemedicine Visit: The patient's condition can be safely assessed and treated via synchronous audio and visual telemedicine encounter.      Reason for Telemedicine Visit: Patient convenience (e.g. access to timely appointments / distance to available provider)    Originating Site (Patient Location): Patient's home    Distant Site (Provider Location): Provider Remote Setting- Home Office    Consent:  The patient/guardian has verbally consented to: the potential risks and benefits of telemedicine (video visit) versus in person care; bill my insurance or make self-payment for services provided; and responsibility for payment of non-covered services.     Patient would like the video invitation sent by:  My Chart    Mode of Communication:  Video Conference via [facility]    Distant Location (Provider):  Off-site    As the provider I attest to compliance with applicable laws and regulations related to telemedicine.    DATA  Interactive Complexity: No  Crisis: No        Progress Since Last Session (Related to Symptoms / Goals / Homework):   Symptoms: No change .    Homework: Completed in session      Episode of Care Goals: Minimal progress - ACTION (Actively working  towards change); Intervened by reinforcing change plan / affirming steps taken     Current / Ongoing Stressors and Concerns:   Client stated she got a new medication.   Feeling off today.    Went to her partners parents cabin over the weekend.      Treatment Objective(s) Addressed in This Session:   Increase interest, engagement, and pleasure in doing things  Feel less tired and more energy during the day        Intervention:   Processed through why the client is feeling off today. Talked about  what might have influenced her feeling off from the weekend. Talked about how she's looking for a job with disability services.      Assessments completed prior to visit:  The following assessments were completed by patient for this visit:  PHQ9:       4/17/2023    12:41 PM 6/12/2023    10:17 AM 6/19/2023    12:20 PM 6/28/2023    11:26 AM 8/1/2023    12:12 PM 8/9/2023    12:54 PM 8/10/2023    12:46 PM   PHQ-9 SCORE   PHQ-9 Total Score MyChart 18 (Moderately severe depression) 20 (Severe depression) 19 (Moderately severe depression) 19 (Moderately severe depression) 22 (Severe depression) 22 (Severe depression) 23 (Severe depression)   PHQ-9 Total Score 18    18 20 19 19 22 22 23     GAD7:       2/14/2023    11:38 AM 3/25/2023     3:01 PM 4/17/2023    12:45 PM 4/26/2023     1:00 PM 5/10/2023    12:51 PM 6/12/2023    10:19 AM 8/1/2023    12:15 PM   BO-7 SCORE   Total Score 17 (severe anxiety) 16 (severe anxiety) 15 (severe anxiety)  17 (severe anxiety) 19 (severe anxiety) 19 (severe anxiety)   Total Score 17    17    17    17    17 16 15    15 18 17 19    19 19     PROMIS 10-Global Health (all questions and answers displayed):       10/26/2022    12:50 PM 1/25/2023    12:48 PM 2/7/2023    12:57 PM 4/17/2023     2:12 PM 6/25/2023     2:45 PM 8/10/2023    12:50 PM   PROMIS 10   In general, would you say your health is:    Poor Poor Fair   In general, would you say your quality of life is:    Poor Fair Poor   In general, how  would you rate your physical health?    Poor Poor Poor   In general, how would you rate your mental health, including your mood and your ability to think?    Fair Fair Poor   In general, how would you rate your satisfaction with your social activities and relationships?    Fair Poor Fair   In general, please rate how well you carry out your usual social activities and roles    Fair Poor Poor   To what extent are you able to carry out your everyday physical activities such as walking, climbing stairs, carrying groceries, or moving a chair?    A little Moderately A little   In the past 7 days, how often have you been bothered by emotional problems such as feeling anxious, depressed, or irritable?    Always Always Always   In the past 7 days, how would you rate your fatigue on average?    Moderate Severe Severe   In the past 7 days, how would you rate your pain on average, where 0 means no pain, and 10 means worst imaginable pain?    8 7 7   In general, would you say your health is:    1    1 1 2   In general, would you say your quality of life is:    1    1 2 1   In general, how would you rate your physical health?    1    1 1 1   In general, how would you rate your mental health, including your mood and your ability to think?    2    2 2 1   In general, how would you rate your satisfaction with your social activities and relationships?    2    2 1 2   In general, please rate how well you carry out your usual social activities and roles. (This includes activities at home, at work and in your community, and responsibilities as a parent, child, spouse, employee, friend, etc.)    2    2 1 1   To what extent are you able to carry out your everyday physical activities such as walking, climbing stairs, carrying groceries, or moving a chair?    2    2 3 2   In the past 7 days, how often have you been bothered by emotional problems such as feeling anxious, depressed, or irritable?    5    5 5 5   In the past 7 days, how would  you rate your fatigue on average?    3    3 4 4   In the past 7 days, how would you rate your pain on average, where 0 means no pain, and 10 means worst imaginable pain?    8    8 7 7   Global Mental Health Score    6    6 6 5   Global Physical Health Score    8    8 8 7   PROMIS TOTAL - SUBSCORES    14    14 14 12       Information is confidential and restricted. Go to Review Flowsheets to unlock data.    Multiple values from one day are sorted in reverse-chronological order     [place] Suicide Severity Rating Scale (Lifetime/Recent)      10/4/2022    11:00 AM   [place] Suicide Severity Rating (Lifetime/Recent)   Q1 Wished to be Dead (Past Month) no   Q2 Suicidal Thoughts (Past Month) no   Q3 Suicidal Thought Method no   Q4 Suicidal Intent without Specific Plan no   Q5 Suicide Intent with Specific Plan yes   Q6 Suicide Behavior (Lifetime) yes   Within the Past 3 Months? no   Level of Risk per Screen high risk         ASSESSMENT: Current Emotional / Mental Status (status of significant symptoms):   Risk status (Self / Other harm or suicidal ideation)   Patient denies current fears or concerns for personal safety.   Patient denies current or recent suicidal ideation or behaviors.   Patient denies current or recent homicidal ideation or behaviors.   Patient denies current or recent self injurious behavior or ideation.   Patient denies other safety concerns.   Patient reports there has been no change in risk factors since their last session.     Patient reports there has been no change in protective factors since their last session.     Recommended that patient call 911 or go to the local ED should there be a change in any of these risk factors.     Appearance:   Appropriate    Eye Contact:   Good    Psychomotor Behavior: Normal    Attitude:   Cooperative    Orientation:   All   Speech    Rate / Production: Normal/ Responsive Normal     Volume:  Normal    Mood:    Normal   Affect:    Appropriate    Thought  Content:  Clear    Thought Form:  Coherent  Logical    Insight:    Good      Medication Review:   No changes to current psychiatric medication(s)     Medication Compliance:   Yes     Changes in Health Issues:   None reported     Chemical Use Review:   Substance Use: Chemical use reviewed, no active concerns identified      Tobacco Use: No change in amount of tobacco use since last session.  Patient declined discussion at this time    Diagnosis:  1. Moderate episode of recurrent major depressive disorder (H)    2. Generalized anxiety disorder        Collateral Reports Completed:   Not Applicable    PLAN: (Patient Tasks / Therapist Tasks / Other)  Continue to work on stress reduction skills.         Ricarda Bhatia [facility]                                                         ______________________________________________________________________    Individual Treatment Plan    Patient's Name: Radha Beckwith  YOB: 1975    Date of Creation: 6/28/23  Date Treatment Plan Last Reviewed/Revised: 6/28/23    DSM5 Diagnoses: 296.32 (F33.1) Major Depressive Disorder, Recurrent Episode, Moderate _  Psychosocial / Contextual Factors: living with boyfriend, memory issues  PROMIS (reviewed every 90 days):     Referral / Collaboration:  Referral to another professional/service is not indicated at this time..    Anticipated number of session for this episode of care: 9-12 sessions  Anticipation frequency of session:  as needed  Anticipated Duration of each session: 38-52 minutes  Treatment plan will be reviewed in 90 days or when goals have been changed.       MeasurableTreatment Goal(s) related to diagnosis / functional impairment(s)  Goal 1: Patient will increase communication skills with family members.    Objective #A (Patient Action)    Patient will learn & utilize at least 2 assertive communication skills weekly.  Status: New - Date: 6/28/23      Intervention(s)  Therapist will teach emotional regulation  skills. distress tolerance skills, interpersonal effectiveness skills, emotion regluation skills, mindfulness skills, radical acceptance. Therapist will teach client how to ID body cues for anxiety, anxiety reduction techniques, how to ID triggers for depression and anxiety- decrease reactivity/eliminate, lifestyle changes to reduce depression and anxiety, communication skills, explore cognitive beliefs and help client to develop healthy cognitive patterns and beliefs.    Objective #B  Patient will use thought-stopping strategy daily to reduce intrusive thoughts.  Status: New - Date: 6/28/23      Intervention(s)  Therapist will teach emotional regulation skills. distress tolerance skills, interpersonal effectiveness skills, emotion regluation skills, mindfulness skills, radical acceptance. Therapist will teach client how to ID body cues for anxiety, anxiety reduction techniques, how to ID triggers for depression and anxiety- decrease reactivity/eliminate, lifestyle changes to reduce depression and anxiety, communication skills, explore cognitive beliefs and help client to develop healthy cognitive patterns and beliefs.      Goal 2: Patient will decrease depression symptoms.      Objective #A (Patient Action)    Status: New - Date: 6/28/23      Patient will Increase interest, engagement, and pleasure in doing things  Decrease frequency and intensity of feeling down, depressed, hopeless  Improve quantity and quality of night time sleep / decrease daytime naps  Feel less tired and more energy during the day   Improve diet, appetite, mindful eating, and / or meal planning  Identify negative self-talk and behaviors: challenge core beliefs, myths, and actions  Improve concentration, focus, and mindfulness in daily activities   Feel less fidgety, restless or slow in daily activities / interpersonal interactions.    Intervention(s)  Therapist will teach emotional regulation skills. distress tolerance skills, interpersonal  effectiveness skills, emotion regluation skills, mindfulness skills, radical acceptance. Therapist will teach client how to ID body cues for anxiety, anxiety reduction techniques, how to ID triggers for depression and anxiety- decrease reactivity/eliminate, lifestyle changes to reduce depression and anxiety, communication skills, explore cognitive beliefs and help client to develop healthy cognitive patterns and beliefs.    Objective #B  Patient will identify three distraction and diversion activities and use those activities to decrease level of anxiety  .    Status: New - Date: 6/28/23      Intervention(s)  Therapist will teach emotional regulation skills. distress tolerance skills, interpersonal effectiveness skills, emotion regluation skills, mindfulness skills, radical acceptance. Therapist will teach client how to ID body cues for anxiety, anxiety reduction techniques, how to ID triggers for depression and anxiety- decrease reactivity/eliminate, lifestyle changes to reduce depression and anxiety, communication skills, explore cognitive beliefs and help client to develop healthy cognitive patterns and beliefs.      Patient has reviewed and agreed to the above plan.      Ricarda Bhatia, [facility]  June 28, 2023

## 2023-08-16 DIAGNOSIS — F41.9 ANXIETY: ICD-10-CM

## 2023-08-17 RX ORDER — ALPRAZOLAM 0.5 MG
TABLET ORAL
Qty: 60 TABLET | Refills: 0 | Status: SHIPPED | OUTPATIENT
Start: 2023-08-17 | End: 2023-09-15

## 2023-08-21 ENCOUNTER — THERAPY VISIT (OUTPATIENT)
Dept: PHYSICAL THERAPY | Facility: CLINIC | Age: 48
End: 2023-08-21
Payer: COMMERCIAL

## 2023-08-21 DIAGNOSIS — M54.50 CHRONIC MIDLINE LOW BACK PAIN WITHOUT SCIATICA: ICD-10-CM

## 2023-08-21 DIAGNOSIS — G89.29 CHRONIC NECK PAIN: Primary | ICD-10-CM

## 2023-08-21 DIAGNOSIS — M54.2 CHRONIC NECK PAIN: Primary | ICD-10-CM

## 2023-08-21 DIAGNOSIS — G89.29 CHRONIC MIDLINE LOW BACK PAIN WITHOUT SCIATICA: ICD-10-CM

## 2023-08-21 PROCEDURE — 97112 NEUROMUSCULAR REEDUCATION: CPT | Mod: GP | Performed by: PHYSICAL THERAPIST

## 2023-08-22 NOTE — PROGRESS NOTES
[facility]                                                                                   OUTPATIENT PHYSICAL THERAPY    PLAN OF TREATMENT FOR OUTPATIENT REHABILITATION   Patient's Last Name, First Name, Radha Ruiz YOB: 1975   Provider's Name   [facility]   Medical Record No.  [id number]     Onset Date: 10/01/20 (Approximately  and has had chronic pain for years)  Start of Care Date: 06/15/23     Medical Diagnosis:  Pain of both sacroiliac joints (M53.3)  - Primary     Chronic neck pain (M54.2, G89.29)     Myofascial pain (M79.18)     Chronic bilateral thoracic back pain (M54.6, G89.29)     Chronic bilateral low back pain with bilateral sciatica (M54.42, M54.41, G89.29)      PT Treatment Diagnosis:  Chronic pain - neck and low back Plan of Treatment  Frequency/Duration: 2 times per week/ 6 weeks    Certification date from 8- to 10-2-2023       See note for plan of treatment details and functional goals     Meme Peacock, PT                         I CERTIFY THE NEED FOR THESE SERVICES FURNISHED UNDER        THIS PLAN OF TREATMENT AND WHILE UNDER MY CARE     (Physician attestation of this document indicates review and certification of the therapy plan).                Referring Provider:  Jenny DENNIS CNP      Initial Assessment  See Epic Evaluation- Start of Care Date: 06/15/23           08/21/23 0500   Appointment Info   Signing clinician's name / credentials Meme Peacock, PT LAT [facility]   Visits Used 7   Medical Diagnosis Pain of both sacroiliac joints (M53.3)  - Primary     Chronic neck pain (M54.2, G89.29)     Myofascial pain (M79.18)     Chronic bilateral thoracic back pain (M54.6, G89.29)     Chronic bilateral low back pain with bilateral sciatica (M54.42, M54.41, G89.29)   PT Tx Diagnosis Chronic pain - neck and low back   Precautions/Limitations Hypersensitive system   Other pertinent  information Radha has been seen in PT in the past. Here for chronic pain specific concerns neck and back. Hypersensitivity. Just had 25 teeth pulled and is in pain from this. Opted to focus on low back this session and neck assessment next session.   Quick Adds Certification   Progress Note/Certification   Start of Care Date 06/15/23   Onset of illness/injury or Date of Surgery 10/01/20  (Approximately  and has had chronic pain for years)   Therapy Frequency 2 times per week   Predicted Duration 8 weeks   Certification date from 06/15/23   Certification date to 08/10/23   Progress Note Due Date 08/10/23   Progress Note Completed Date 08/21/23       Present No   PT Goal 1   Goal Identifier Calm symptoms   Goal Description Use pain science concepts including pacing, posture, positioning and graded exposure to develop a list of tools that will calm symptoms allowing her to be active 10 minutes before symptoms escalate   Goal Progress has been difficult and did not pay attention with other issues, is pacing   Target Date 09/20/23   PT Goal 2   Goal Identifier Cervical assessment   Goal Description Neck assessment completed to guide home program   Date Met 07/24/23   PT Goal 3   Goal Identifier Relaxation   Goal Description Relax shoulders and neck so she can move easier for looking around environment and to be in healthful posture   Goal Progress Paying attention to the posture and correcting it - improving .   Target Date 09/30/23   Subjective Report   Subjective Report Constant neck pain and cracking with turning even without exercises. The cracking can be painful. CT junction area is burning and goes up neck. Last couple weeks, wakes up and entire head feels tingling and constant headache. Feels like a tinnitus throughout her head. Constant issue with lower thoracic upper lumbar area and feels it is from the scapular rolls and cannot change it. This began at the time her last session -  "completed scapular depression and went too far arching low back. Has been walking approx 20-30 minutes once a day unless bad air quality or excessive heat. Low back is better but when bending over increases the symptoms and she is monitoring this. Has not been sleeping decently  - feels like has not slept last 3 weeks. HEAD: aggravated by moving neck.  having neck against pillow eg sitting upright and contrast packs helps.   Objective Measure 1   Objective Measure ALBERT   Objective Measure 2   Objective Measure MICHELLE (9-5)   Objective Measure 3   Objective Measure NDI (10-30-30-60)   Objective Measure 4   Objective Measure Cervical AROM   Details Rotation: 36 degrees R and 56 degrees L.   Objective Measure 5   Objective Measure Assessment   Details Head symptoms: from \"knot\"/\"CT junction to suboccipital base bilaterally, TIngle: in ear, top of head, face across frontal area, temples L side more than R, into eyes, inside mouth and where teeth would be, tongue -entire tongue but more towards the back. feels like a sinus pressure in the nose and eyes. Cheeks and gums. VAS: Tingle 10/10, feels hot into back and neck and shoulders just by light neck movement. Nauseated despite medication. Prolonged Cervical flexion in sitting: behind ears, into the L upper trap into arm and down spine increase 12/10;prolonged cervical extension: 12/10 head and neck pressure 13/10 into bilateral UT and upper scapula L into upper arm warm sensation into mid back and increase with return to neutral. Protraction: feels stretch anterior neck, L upper trap pressure, 10/10; Retraction: entire head burning, hot flash feeling, nauseated at 14/10. Noting dizziness ie off balance. Then gets tingle  feel in mouth and cheek to cheek.   Treatment Interventions (PT)   Interventions Neuromuscular Re-education   Neuromuscular Re-education   Neuromuscular re-ed of mvmt, balance, coord, kinesthetic sense, posture, proprioception minutes ([id number]) 02   Neuro " Re-ed 1 Pain neuroscience education   Neuro Re-ed 1 - Details Janneth neighbors: understand wide spread pain and immune system protection. Importance of focusing on neck and low back. Explained mechanical symptoms.   Neuro Re-ed 2 Posture and positioning   Neuro Re-ed 2 - Details Recheck of positioning and posture with vacuuming and other activities such as gardening. ROM check.   Skilled Intervention education, review of home activities and homme program   Patient Response/Progress She feels she should be checked with new symptoms and plans to schedule appt with her primary care provider for her neck and head issues.   Education   Learner/Method Patient;Listening;No Barriers to Learning   Education Comments reviewed home program and updated her progress   Plan   Home program sitting: break up with standing, walking, use of lumbar roll, Mature organism model intro, cervical retraction - supine every 1-2 hours and before synmptoms increase, alternate TNS placement technique, lifting properly with kinesiotape in 2 mechanical strips, scapular sets   Updates to plan of care 2 times per week   Plan for next session Check low back area, self mobilization for mid back, manual therapy to the cervical spine.   Comments   Comments Linette has not been in PT since 5- as she has had other health issues. Feels her neck and head issues are significantly increasing and plans to schedule appt with her primary care provider. She reports flexion of the low back increases her symptoms and she has been mindful of her back position. She is pacing her vacuuming and gardening. SKilled intervention to advance home activities, relaxation techniques, and aerobic activities as well as specific ROM, positioning for back and neck.   Total Session Time   Timed Code Treatment Minutes 45   Total Treatment Time (sum of timed and untimed services) 45

## 2023-08-23 ENCOUNTER — TELEPHONE (OUTPATIENT)
Dept: FAMILY MEDICINE | Facility: CLINIC | Age: 48
End: 2023-08-23
Payer: COMMERCIAL

## 2023-08-23 NOTE — TELEPHONE ENCOUNTER
..  General Call    Contacts         Type Contact Phone/Fax    08/23/2023 11:30 AM CDT Phone (Incoming) iLnette Beckwith (Self) [phone] (M)     referral for mammo          Reason for Call:  referral for mammogram    What are your questions or concerns:  n\a    Date of last appointment with provider: [id number]    Could we send this information to you in [facility] or would you prefer to receive a phone call?:   Patient would prefer a phone call   Okay to leave a detailed message?: Yes at Cell number on file:    Telephone Information:   Mobile [phone]

## 2023-08-25 ENCOUNTER — VIRTUAL VISIT (OUTPATIENT)
Dept: INTERNAL MEDICINE | Facility: CLINIC | Age: 48
End: 2023-08-25
Payer: COMMERCIAL

## 2023-08-25 DIAGNOSIS — H93.13 RINGING IN EAR, BILATERAL: ICD-10-CM

## 2023-08-25 DIAGNOSIS — M62.838 NECK MUSCLE SPASM: Primary | ICD-10-CM

## 2023-08-25 PROCEDURE — 99213 OFFICE O/P EST LOW 20 MIN: CPT | Mod: VID | Performed by: INTERNAL MEDICINE

## 2023-08-25 NOTE — PROGRESS NOTES
"Linette is a 47 year old who is being evaluated via a billable video visit.      How would you like to obtain your AVS? MyChart  If the video visit is dropped, the invitation should be resent by: Send to e-mail at: [email or web address]  Will anyone else be joining your video visit? No          Assessment & Plan     Neck muscle spasm  Neck spasms, discussed with the patient and she is already seeing PT.  Recommended heat, topicals.  She is already off baclofen and Flexeril.  No other medications to give her at this time.  She should work on stretching.  She will check her blood pressure make sure its not running high if she is running extremely high she should go to the emergency room.    Ringing in ear, bilateral  Ringing in both of her ears, patient does have hearing aids.  I do not think this is a stroke.  Her cranial nerves are all intact on video examination.  Unfortunately I cannot look in her ears.  She should at some point get an ear examination if this continues she could have wax, other fluid buildup does not sound like an ear infection.  We will do no antibiotics.  Continue the Zyrtec for allergies.    Patient can continue to follow with her pain clinic, physical therapy and follow-up with her primary as needed.             Nicotine/Tobacco Cessation:  She reports that she has been smoking cigarettes. She has a 5.00 pack-year smoking history. She has never used smokeless tobacco.  Nicotine/Tobacco Cessation Plan:         BMI:   Estimated body mass index is 29.7 kg/m  as calculated from the following:    Height as of 2/27/23: 1.626 m (5' 4\").    Weight as of 6/20/23: 78.5 kg (173 lb).           Fer Adame MD  [facility]    Subjective   Linette is a 47 year old, presenting for the following health issues:  Neck Pain and Ear Problem (Ringing in the ears)      8/25/2023    10:22 AM   Additional Questions   Roomed by Milena Dickerson       Ear Problem    History of Present Illness "       Reason for visit:  Severe neck pain, ringing in ears, head feels tingly, headaches, pain all over body  Symptom onset:  More than a month  Symptoms include:  Severe neck pain, ears ringing, head tingling, headaches, severe back pain, anywhere I touch my body, it hurts  Symptom intensity:  Severe  Symptom progression:  Worsening  Had these symptoms before:  Yes  Has tried/received treatment for these symptoms:  Yes  Previous treatment was successful:  No  What makes it worse:  Daily activities-dishes, laundry, vacuuming   What makes it better:  Heating pad and ice packs help a little    She eats 2-3 servings of fruits and vegetables daily.She consumes 0 sweetened beverage(s) daily.She exercises with enough effort to increase her heart rate 9 or less minutes per day.  She exercises with enough effort to increase her heart rate 3 or less days per week.   She is taking medications regularly.       Doing PT, has a knot in the back of her neck,   Ears are ringing and head is tingling.  Wears hearing aid, ringing still there.      Hasn't been sick, teeth pulled in May.      Sees PT on Monday again.  Will go to two times a week.     Taking her zyrtec, no nasal congestion.      Has a new blood pressure cuff and will check her bp.        Review of Systems   HENT:  Positive for ear pain.           Objective    Vitals - Patient Reported  Pain Score: Extreme Pain (8)  Pain Loc: Neck        Physical Exam   GENERAL: Healthy, alert and no distress  EYES: Eyes grossly normal to inspection.  No discharge or erythema, or obvious scleral/conjunctival abnormalities.  RESP: No audible wheeze, cough, or visible cyanosis.  No visible retractions or increased work of breathing.    SKIN: Visible skin clear. No significant rash, abnormal pigmentation or lesions.  NEURO: Cranial nerves grossly intact.  Mentation and speech appropriate for age.  PSYCH: Mentation appears normal, affect normal/bright, judgement and insight intact, normal  speech and appearance well-groomed.                Video-Visit Details    Type of service:  Video Visit   Video Start Time:  10:46  Video End Time:10:57 AM    Originating Location (pt. Location): Home    Distant Location (provider location):  On-site  Platform used for Video Visit: Mai

## 2023-08-28 ENCOUNTER — THERAPY VISIT (OUTPATIENT)
Dept: PHYSICAL THERAPY | Facility: CLINIC | Age: 48
End: 2023-08-28
Payer: COMMERCIAL

## 2023-08-28 DIAGNOSIS — G89.29 CHRONIC MIDLINE LOW BACK PAIN WITHOUT SCIATICA: ICD-10-CM

## 2023-08-28 DIAGNOSIS — M54.50 CHRONIC MIDLINE LOW BACK PAIN WITHOUT SCIATICA: ICD-10-CM

## 2023-08-28 DIAGNOSIS — G89.29 CHRONIC NECK PAIN: Primary | ICD-10-CM

## 2023-08-28 DIAGNOSIS — M54.2 CHRONIC NECK PAIN: Primary | ICD-10-CM

## 2023-08-28 PROCEDURE — 97112 NEUROMUSCULAR REEDUCATION: CPT | Mod: GP | Performed by: PHYSICAL THERAPIST

## 2023-08-29 ENCOUNTER — VIRTUAL VISIT (OUTPATIENT)
Dept: PSYCHOLOGY | Facility: OTHER | Age: 48
End: 2023-08-29
Payer: COMMERCIAL

## 2023-08-29 DIAGNOSIS — F41.1 GENERALIZED ANXIETY DISORDER: ICD-10-CM

## 2023-08-29 DIAGNOSIS — F33.1 MODERATE EPISODE OF RECURRENT MAJOR DEPRESSIVE DISORDER (H): Primary | ICD-10-CM

## 2023-08-29 PROCEDURE — 90834 PSYTX W PT 45 MINUTES: CPT | Mod: VID | Performed by: COUNSELOR

## 2023-08-29 NOTE — PROGRESS NOTES
Answers submitted by the patient for this visit:  Patient Health Questionnaire (Submitted on 8/9/2023)  If you checked off any problems, how difficult have these problems made it for you to do your work, take care of things at home, or get along with other people?: Extremely difficult  PHQ9 TOTAL SCORE: 22            [facility]                                     Progress Note    Patient Name: Radha Beckwith  Date: 8/29/23         Service Type: Individual      Session Start Time: 3:05pm Session End Time: 3:55pm     Session Length: 50    Session #: 7    Attendees: Client    Service Modality:  Video Visit:      Provider verified identity through the following two step process.  Patient provided:  Patient is known previously to provider    Telemedicine Visit: The patient's condition can be safely assessed and treated via synchronous audio and visual telemedicine encounter.      Reason for Telemedicine Visit: Patient convenience (e.g. access to timely appointments / distance to available provider)    Originating Site (Patient Location): Patient's home    Distant Site (Provider Location): Provider Remote Setting- Home Office    Consent:  The patient/guardian has verbally consented to: the potential risks and benefits of telemedicine (video visit) versus in person care; bill my insurance or make self-payment for services provided; and responsibility for payment of non-covered services.     Patient would like the video invitation sent by:  My Chart    Mode of Communication:  Video Conference via [employer]    Distant Location (Provider):  Off-site    As the provider I attest to compliance with applicable laws and regulations related to telemedicine.    DATA  Interactive Complexity: No  Crisis: No        Progress Since Last Session (Related to Symptoms / Goals / Homework):   Symptoms: No change .    Homework: Completed in session      Episode of Care Goals: Minimal progress - ACTION (Actively working  towards change); Intervened by reinforcing change plan / affirming steps taken     Current / Ongoing Stressors and Concerns:   Client stated she's not been feeling well and in a lot of pain.    Client is having a garage sale next month.    Her son and his girlfriend broke up.   Has been writing down things/memories from her mom that she wants to process.     Treatment Objective(s) Addressed in This Session:   Increase interest, engagement, and pleasure in doing things  Feel less tired and more energy during the day        Intervention:   Talked about her frustrations with PT lately and how she feels she isn't getting better. Discussed some things she can look into to help her with her treatment. Talked about her son's breakup and how she's been helping him through this.     Assessments completed prior to visit:  The following assessments were completed by patient for this visit:  PHQ9:       4/17/2023    12:41 PM 6/12/2023    10:17 AM 6/19/2023    12:20 PM 6/28/2023    11:26 AM 8/1/2023    12:12 PM 8/9/2023    12:54 PM 8/10/2023    12:46 PM   PHQ-9 SCORE   PHQ-9 Total Score MyChart 18 (Moderately severe depression) 20 (Severe depression) 19 (Moderately severe depression) 19 (Moderately severe depression) 22 (Severe depression) 22 (Severe depression) 23 (Severe depression)   PHQ-9 Total Score 18    18 20 19 19 22 22 23     GAD7:       2/14/2023    11:38 AM 3/25/2023     3:01 PM 4/17/2023    12:45 PM 4/26/2023     1:00 PM 5/10/2023    12:51 PM 6/12/2023    10:19 AM 8/1/2023    12:15 PM   BO-7 SCORE   Total Score 17 (severe anxiety) 16 (severe anxiety) 15 (severe anxiety)  17 (severe anxiety) 19 (severe anxiety) 19 (severe anxiety)   Total Score 17    17    17    17    17 16 15    15 18 17 19    19 19     PROMIS 10-Global Health (all questions and answers displayed):       10/26/2022    12:50 PM 1/25/2023    12:48 PM 2/7/2023    12:57 PM 4/17/2023     2:12 PM 6/25/2023     2:45 PM 8/10/2023    12:50 PM   PROMIS 10    In general, would you say your health is:    Poor Poor Fair   In general, would you say your quality of life is:    Poor Fair Poor   In general, how would you rate your physical health?    Poor Poor Poor   In general, how would you rate your mental health, including your mood and your ability to think?    Fair Fair Poor   In general, how would you rate your satisfaction with your social activities and relationships?    Fair Poor Fair   In general, please rate how well you carry out your usual social activities and roles    Fair Poor Poor   To what extent are you able to carry out your everyday physical activities such as walking, climbing stairs, carrying groceries, or moving a chair?    A little Moderately A little   In the past 7 days, how often have you been bothered by emotional problems such as feeling anxious, depressed, or irritable?    Always Always Always   In the past 7 days, how would you rate your fatigue on average?    Moderate Severe Severe   In the past 7 days, how would you rate your pain on average, where 0 means no pain, and 10 means worst imaginable pain?    8 7 7   In general, would you say your health is:    1    1 1 2   In general, would you say your quality of life is:    1    1 2 1   In general, how would you rate your physical health?    1    1 1 1   In general, how would you rate your mental health, including your mood and your ability to think?    2    2 2 1   In general, how would you rate your satisfaction with your social activities and relationships?    2    2 1 2   In general, please rate how well you carry out your usual social activities and roles. (This includes activities at home, at work and in your community, and responsibilities as a parent, child, spouse, employee, friend, etc.)    2    2 1 1   To what extent are you able to carry out your everyday physical activities such as walking, climbing stairs, carrying groceries, or moving a chair?    2    2 3 2   In the past 7 days,  how often have you been bothered by emotional problems such as feeling anxious, depressed, or irritable?    5    5 5 5   In the past 7 days, how would you rate your fatigue on average?    3    3 4 4   In the past 7 days, how would you rate your pain on average, where 0 means no pain, and 10 means worst imaginable pain?    8    8 7 7   Global Mental Health Score    6    6 6 5   Global Physical Health Score    8    8 8 7   PROMIS TOTAL - SUBSCORES    14    14 14 12       Information is confidential and restricted. Go to Review Flowsheets to unlock data.    Multiple values from one day are sorted in reverse-chronological order     [place] Suicide Severity Rating Scale (Lifetime/Recent)      10/4/2022    11:00 AM   [place] Suicide Severity Rating (Lifetime/Recent)   Q1 Wished to be Dead (Past Month) no   Q2 Suicidal Thoughts (Past Month) no   Q3 Suicidal Thought Method no   Q4 Suicidal Intent without Specific Plan no   Q5 Suicide Intent with Specific Plan yes   Q6 Suicide Behavior (Lifetime) yes   Within the Past 3 Months? no   Level of Risk per Screen high risk         ASSESSMENT: Current Emotional / Mental Status (status of significant symptoms):   Risk status (Self / Other harm or suicidal ideation)   Patient denies current fears or concerns for personal safety.   Patient denies current or recent suicidal ideation or behaviors.   Patient denies current or recent homicidal ideation or behaviors.   Patient denies current or recent self injurious behavior or ideation.   Patient denies other safety concerns.   Patient reports there has been no change in risk factors since their last session.     Patient reports there has been no change in protective factors since their last session.     Recommended that patient call 911 or go to the local ED should there be a change in any of these risk factors.     Appearance:   Appropriate    Eye Contact:   Good    Psychomotor Behavior: Normal    Attitude:   Cooperative     Orientation:   All   Speech    Rate / Production: Normal/ Responsive Normal     Volume:  Normal    Mood:    Normal   Affect:    Appropriate    Thought Content:  Clear    Thought Form:  Coherent  Logical    Insight:    Good      Medication Review:   No changes to current psychiatric medication(s)     Medication Compliance:   Yes     Changes in Health Issues:   None reported     Chemical Use Review:   Substance Use: Chemical use reviewed, no active concerns identified      Tobacco Use: No change in amount of tobacco use since last session.  Patient declined discussion at this time    Diagnosis:  1. Moderate episode of recurrent major depressive disorder (H)    2. Generalized anxiety disorder        Collateral Reports Completed:   Not Applicable    PLAN: (Patient Tasks / Therapist Tasks / Other)  Continue to work on stress reduction skills.         Ricarda Bhatia, [facility]                                                         ______________________________________________________________________    Individual Treatment Plan    Patient's Name: Radha Beckwith  YOB: 1975    Date of Creation: 6/28/23  Date Treatment Plan Last Reviewed/Revised: 6/28/23    DSM5 Diagnoses: 296.32 (F33.1) Major Depressive Disorder, Recurrent Episode, Moderate _  Psychosocial / Contextual Factors: living with boyfriend, memory issues  PROMIS (reviewed every 90 days):     Referral / Collaboration:  Referral to another professional/service is not indicated at this time..    Anticipated number of session for this episode of care: 9-12 sessions  Anticipation frequency of session:  as needed  Anticipated Duration of each session: 38-52 minutes  Treatment plan will be reviewed in 90 days or when goals have been changed.       MeasurableTreatment Goal(s) related to diagnosis / functional impairment(s)  Goal 1: Patient will increase communication skills with family members.    Objective #A (Patient Action)    Patient will learn &  utilize at least 2 assertive communication skills weekly.  Status: New - Date: 6/28/23      Intervention(s)  Therapist will teach emotional regulation skills. distress tolerance skills, interpersonal effectiveness skills, emotion regluation skills, mindfulness skills, radical acceptance. Therapist will teach client how to ID body cues for anxiety, anxiety reduction techniques, how to ID triggers for depression and anxiety- decrease reactivity/eliminate, lifestyle changes to reduce depression and anxiety, communication skills, explore cognitive beliefs and help client to develop healthy cognitive patterns and beliefs.    Objective #B  Patient will use thought-stopping strategy daily to reduce intrusive thoughts.  Status: New - Date: 6/28/23      Intervention(s)  Therapist will teach emotional regulation skills. distress tolerance skills, interpersonal effectiveness skills, emotion regluation skills, mindfulness skills, radical acceptance. Therapist will teach client how to ID body cues for anxiety, anxiety reduction techniques, how to ID triggers for depression and anxiety- decrease reactivity/eliminate, lifestyle changes to reduce depression and anxiety, communication skills, explore cognitive beliefs and help client to develop healthy cognitive patterns and beliefs.      Goal 2: Patient will decrease depression symptoms.      Objective #A (Patient Action)    Status: New - Date: 6/28/23      Patient will Increase interest, engagement, and pleasure in doing things  Decrease frequency and intensity of feeling down, depressed, hopeless  Improve quantity and quality of night time sleep / decrease daytime naps  Feel less tired and more energy during the day   Improve diet, appetite, mindful eating, and / or meal planning  Identify negative self-talk and behaviors: challenge core beliefs, myths, and actions  Improve concentration, focus, and mindfulness in daily activities   Feel less fidgety, restless or slow in daily  activities / interpersonal interactions.    Intervention(s)  Therapist will teach emotional regulation skills. distress tolerance skills, interpersonal effectiveness skills, emotion regluation skills, mindfulness skills, radical acceptance. Therapist will teach client how to ID body cues for anxiety, anxiety reduction techniques, how to ID triggers for depression and anxiety- decrease reactivity/eliminate, lifestyle changes to reduce depression and anxiety, communication skills, explore cognitive beliefs and help client to develop healthy cognitive patterns and beliefs.    Objective #B  Patient will identify three distraction and diversion activities and use those activities to decrease level of anxiety  .    Status: New - Date: 6/28/23      Intervention(s)  Therapist will teach emotional regulation skills. distress tolerance skills, interpersonal effectiveness skills, emotion regluation skills, mindfulness skills, radical acceptance. Therapist will teach client how to ID body cues for anxiety, anxiety reduction techniques, how to ID triggers for depression and anxiety- decrease reactivity/eliminate, lifestyle changes to reduce depression and anxiety, communication skills, explore cognitive beliefs and help client to develop healthy cognitive patterns and beliefs.      Patient has reviewed and agreed to the above plan.      Ricarda Bhatia [facility]  June 28, 2023

## 2023-08-31 DIAGNOSIS — F51.04 PSYCHOPHYSIOLOGICAL INSOMNIA: ICD-10-CM

## 2023-08-31 DIAGNOSIS — E78.2 MIXED HYPERLIPIDEMIA: ICD-10-CM

## 2023-08-31 DIAGNOSIS — F41.9 ANXIETY: ICD-10-CM

## 2023-08-31 DIAGNOSIS — K21.00 GASTROESOPHAGEAL REFLUX DISEASE WITH ESOPHAGITIS WITHOUT HEMORRHAGE: Primary | ICD-10-CM

## 2023-08-31 RX ORDER — SIMVASTATIN 10 MG
TABLET ORAL
Qty: 30 TABLET | Refills: 0 | Status: SHIPPED | OUTPATIENT
Start: 2023-08-31 | End: 2023-10-10

## 2023-08-31 RX ORDER — ALPRAZOLAM 1 MG
TABLET ORAL
Qty: 60 TABLET | Refills: 0 | OUTPATIENT
Start: 2023-08-31

## 2023-08-31 RX ORDER — ZOLPIDEM TARTRATE 5 MG/1
TABLET ORAL
Qty: 30 TABLET | Refills: 0 | Status: SHIPPED | OUTPATIENT
Start: 2023-08-31 | End: 2023-10-10

## 2023-09-05 ENCOUNTER — THERAPY VISIT (OUTPATIENT)
Dept: PHYSICAL THERAPY | Facility: CLINIC | Age: 48
End: 2023-09-05
Payer: COMMERCIAL

## 2023-09-05 ENCOUNTER — LAB (OUTPATIENT)
Dept: LAB | Facility: CLINIC | Age: 48
End: 2023-09-05
Payer: COMMERCIAL

## 2023-09-05 DIAGNOSIS — M54.50 CHRONIC MIDLINE LOW BACK PAIN WITHOUT SCIATICA: ICD-10-CM

## 2023-09-05 DIAGNOSIS — G89.29 CHRONIC MIDLINE LOW BACK PAIN WITHOUT SCIATICA: ICD-10-CM

## 2023-09-05 DIAGNOSIS — G89.29 CHRONIC NECK PAIN: Primary | ICD-10-CM

## 2023-09-05 DIAGNOSIS — E78.2 MIXED HYPERLIPIDEMIA: ICD-10-CM

## 2023-09-05 DIAGNOSIS — K21.00 GASTROESOPHAGEAL REFLUX DISEASE WITH ESOPHAGITIS WITHOUT HEMORRHAGE: ICD-10-CM

## 2023-09-05 DIAGNOSIS — M54.2 CHRONIC NECK PAIN: Primary | ICD-10-CM

## 2023-09-05 LAB
ANION GAP SERPL CALCULATED.3IONS-SCNC: 14 MMOL/L (ref 7–15)
BUN SERPL-MCNC: 14.7 MG/DL (ref 6–20)
CALCIUM SERPL-MCNC: 10.1 MG/DL (ref 8.6–10)
CHLORIDE SERPL-SCNC: 100 MMOL/L (ref 98–107)
CHOLEST SERPL-MCNC: 218 MG/DL
CREAT SERPL-MCNC: 0.76 MG/DL (ref 0.51–0.95)
DEPRECATED HCO3 PLAS-SCNC: 25 MMOL/L (ref 22–29)
GFR SERPL CREATININE-BSD FRML MDRD: >90 ML/MIN/1.73M2
GLUCOSE SERPL-MCNC: 129 MG/DL (ref 70–99)
HDLC SERPL-MCNC: 31 MG/DL
LDLC SERPL CALC-MCNC: ABNORMAL MG/DL
NONHDLC SERPL-MCNC: 187 MG/DL
POTASSIUM SERPL-SCNC: 3.9 MMOL/L (ref 3.4–5.3)
SODIUM SERPL-SCNC: 139 MMOL/L (ref 136–145)
TRIGL SERPL-MCNC: 456 MG/DL

## 2023-09-05 PROCEDURE — 97112 NEUROMUSCULAR REEDUCATION: CPT | Mod: GP | Performed by: PHYSICAL THERAPIST

## 2023-09-05 PROCEDURE — 80048 BASIC METABOLIC PNL TOTAL CA: CPT

## 2023-09-05 PROCEDURE — 80061 LIPID PANEL: CPT

## 2023-09-05 PROCEDURE — 36415 COLL VENOUS BLD VENIPUNCTURE: CPT

## 2023-09-05 PROCEDURE — 97140 MANUAL THERAPY 1/> REGIONS: CPT | Mod: GP | Performed by: PHYSICAL THERAPIST

## 2023-09-05 PROCEDURE — 83721 ASSAY OF BLOOD LIPOPROTEIN: CPT | Mod: 59

## 2023-09-05 NOTE — TELEPHONE ENCOUNTER
Patient notified. Patient would like to get her glucose tested as well can you place the order for that too? Patient is coming in on the 9/6   Babs Villagomez MA 9/5/2023

## 2023-09-06 ENCOUNTER — APPOINTMENT (OUTPATIENT)
Dept: CT IMAGING | Facility: CLINIC | Age: 48
End: 2023-09-06
Attending: STUDENT IN AN ORGANIZED HEALTH CARE EDUCATION/TRAINING PROGRAM
Payer: COMMERCIAL

## 2023-09-06 ENCOUNTER — NURSE TRIAGE (OUTPATIENT)
Dept: NURSING | Facility: CLINIC | Age: 48
End: 2023-09-06

## 2023-09-06 ENCOUNTER — HOSPITAL ENCOUNTER (EMERGENCY)
Facility: CLINIC | Age: 48
Discharge: HOME | End: 2023-09-07
Attending: STUDENT IN AN ORGANIZED HEALTH CARE EDUCATION/TRAINING PROGRAM | Admitting: STUDENT IN AN ORGANIZED HEALTH CARE EDUCATION/TRAINING PROGRAM
Payer: COMMERCIAL

## 2023-09-06 ENCOUNTER — APPOINTMENT (OUTPATIENT)
Dept: GENERAL RADIOLOGY | Facility: CLINIC | Age: 48
End: 2023-09-06
Attending: STUDENT IN AN ORGANIZED HEALTH CARE EDUCATION/TRAINING PROGRAM
Payer: COMMERCIAL

## 2023-09-06 ENCOUNTER — LAB (OUTPATIENT)
Dept: LAB | Facility: CLINIC | Age: 48
End: 2023-09-06
Payer: COMMERCIAL

## 2023-09-06 ENCOUNTER — HOSPITAL ENCOUNTER (OUTPATIENT)
Dept: MAMMOGRAPHY | Facility: CLINIC | Age: 48
Discharge: HOME | End: 2023-09-06
Attending: NURSE PRACTITIONER
Payer: COMMERCIAL

## 2023-09-06 VITALS
RESPIRATION RATE: 18 BRPM | BODY MASS INDEX: 29.87 KG/M2 | TEMPERATURE: 97.3 F | OXYGEN SATURATION: 99 % | SYSTOLIC BLOOD PRESSURE: 168 MMHG | WEIGHT: 174 LBS | DIASTOLIC BLOOD PRESSURE: 91 MMHG | HEART RATE: 112 BPM

## 2023-09-06 DIAGNOSIS — R92.8 BI-RADS CATEGORY 3 MAMMOGRAM RESULT: ICD-10-CM

## 2023-09-06 DIAGNOSIS — K21.00 GASTROESOPHAGEAL REFLUX DISEASE WITH ESOPHAGITIS WITHOUT HEMORRHAGE: Primary | ICD-10-CM

## 2023-09-06 DIAGNOSIS — M54.2 NECK PAIN: ICD-10-CM

## 2023-09-06 DIAGNOSIS — G89.29 CHRONIC THORACIC BACK PAIN, UNSPECIFIED BACK PAIN LATERALITY: ICD-10-CM

## 2023-09-06 DIAGNOSIS — R20.0 NUMBNESS: ICD-10-CM

## 2023-09-06 DIAGNOSIS — M54.6 CHRONIC THORACIC BACK PAIN, UNSPECIFIED BACK PAIN LATERALITY: ICD-10-CM

## 2023-09-06 LAB
BASOPHILS # BLD AUTO: 0 10E3/UL (ref 0–0.2)
BASOPHILS NFR BLD AUTO: 1 %
EOSINOPHIL # BLD AUTO: 0.1 10E3/UL (ref 0–0.7)
EOSINOPHIL NFR BLD AUTO: 2 %
ERYTHROCYTE [DISTWIDTH] IN BLOOD BY AUTOMATED COUNT: 13.3 % (ref 10–15)
ERYTHROCYTE [SEDIMENTATION RATE] IN BLOOD BY WESTERGREN METHOD: 21 MM/HR (ref 0–20)
HCT VFR BLD AUTO: 36.5 % (ref 35–47)
HGB BLD-MCNC: 12.4 G/DL (ref 11.7–15.7)
HOLD SPECIMEN: NORMAL
HOLD SPECIMEN: NORMAL
IMM GRANULOCYTES # BLD: 0 10E3/UL
IMM GRANULOCYTES NFR BLD: 1 %
LDLC SERPL DIRECT ASSAY-MCNC: 117 MG/DL
LYMPHOCYTES # BLD AUTO: 2.5 10E3/UL (ref 0.8–5.3)
LYMPHOCYTES NFR BLD AUTO: 41 %
MCH RBC QN AUTO: 29.7 PG (ref 26.5–33)
MCHC RBC AUTO-ENTMCNC: 34 G/DL (ref 31.5–36.5)
MCV RBC AUTO: 88 FL (ref 78–100)
MONOCYTES # BLD AUTO: 0.4 10E3/UL (ref 0–1.3)
MONOCYTES NFR BLD AUTO: 7 %
NEUTROPHILS # BLD AUTO: 3 10E3/UL (ref 1.6–8.3)
NEUTROPHILS NFR BLD AUTO: 48 %
NRBC # BLD AUTO: 0 10E3/UL
NRBC BLD AUTO-RTO: 0 /100
PLATELET # BLD AUTO: 211 10E3/UL (ref 150–450)
RBC # BLD AUTO: 4.17 10E6/UL (ref 3.8–5.2)
WBC # BLD AUTO: 6.1 10E3/UL (ref 4–11)

## 2023-09-06 PROCEDURE — 77066 DX MAMMO INCL CAD BI: CPT

## 2023-09-06 PROCEDURE — 96361 HYDRATE IV INFUSION ADD-ON: CPT | Performed by: STUDENT IN AN ORGANIZED HEALTH CARE EDUCATION/TRAINING PROGRAM

## 2023-09-06 PROCEDURE — 86140 C-REACTIVE PROTEIN: CPT | Performed by: STUDENT IN AN ORGANIZED HEALTH CARE EDUCATION/TRAINING PROGRAM

## 2023-09-06 PROCEDURE — 80053 COMPREHEN METABOLIC PANEL: CPT | Performed by: STUDENT IN AN ORGANIZED HEALTH CARE EDUCATION/TRAINING PROGRAM

## 2023-09-06 PROCEDURE — 85652 RBC SED RATE AUTOMATED: CPT | Performed by: STUDENT IN AN ORGANIZED HEALTH CARE EDUCATION/TRAINING PROGRAM

## 2023-09-06 PROCEDURE — 96374 THER/PROPH/DIAG INJ IV PUSH: CPT | Mod: 59 | Performed by: STUDENT IN AN ORGANIZED HEALTH CARE EDUCATION/TRAINING PROGRAM

## 2023-09-06 PROCEDURE — 70450 CT HEAD/BRAIN W/O DYE: CPT

## 2023-09-06 PROCEDURE — 72125 CT NECK SPINE W/O DYE: CPT

## 2023-09-06 PROCEDURE — 258N000003 HC RX IP 258 OP 636: Performed by: STUDENT IN AN ORGANIZED HEALTH CARE EDUCATION/TRAINING PROGRAM

## 2023-09-06 PROCEDURE — 99285 EMERGENCY DEPT VISIT HI MDM: CPT | Mod: 25 | Performed by: STUDENT IN AN ORGANIZED HEALTH CARE EDUCATION/TRAINING PROGRAM

## 2023-09-06 PROCEDURE — 84484 ASSAY OF TROPONIN QUANT: CPT | Performed by: STUDENT IN AN ORGANIZED HEALTH CARE EDUCATION/TRAINING PROGRAM

## 2023-09-06 PROCEDURE — 250N000011 HC RX IP 250 OP 636: Performed by: STUDENT IN AN ORGANIZED HEALTH CARE EDUCATION/TRAINING PROGRAM

## 2023-09-06 PROCEDURE — 72128 CT CHEST SPINE W/O DYE: CPT

## 2023-09-06 PROCEDURE — 84703 CHORIONIC GONADOTROPIN ASSAY: CPT | Performed by: STUDENT IN AN ORGANIZED HEALTH CARE EDUCATION/TRAINING PROGRAM

## 2023-09-06 PROCEDURE — 87637 SARSCOV2&INF A&B&RSV AMP PRB: CPT | Performed by: STUDENT IN AN ORGANIZED HEALTH CARE EDUCATION/TRAINING PROGRAM

## 2023-09-06 PROCEDURE — 99284 EMERGENCY DEPT VISIT MOD MDM: CPT | Mod: 25 | Performed by: STUDENT IN AN ORGANIZED HEALTH CARE EDUCATION/TRAINING PROGRAM

## 2023-09-06 PROCEDURE — 93005 ELECTROCARDIOGRAM TRACING: CPT | Performed by: STUDENT IN AN ORGANIZED HEALTH CARE EDUCATION/TRAINING PROGRAM

## 2023-09-06 PROCEDURE — 70498 CT ANGIOGRAPHY NECK: CPT

## 2023-09-06 PROCEDURE — 85025 COMPLETE CBC W/AUTO DIFF WBC: CPT | Performed by: STUDENT IN AN ORGANIZED HEALTH CARE EDUCATION/TRAINING PROGRAM

## 2023-09-06 PROCEDURE — 83690 ASSAY OF LIPASE: CPT | Performed by: STUDENT IN AN ORGANIZED HEALTH CARE EDUCATION/TRAINING PROGRAM

## 2023-09-06 PROCEDURE — 36415 COLL VENOUS BLD VENIPUNCTURE: CPT | Performed by: STUDENT IN AN ORGANIZED HEALTH CARE EDUCATION/TRAINING PROGRAM

## 2023-09-06 PROCEDURE — 84443 ASSAY THYROID STIM HORMONE: CPT | Performed by: STUDENT IN AN ORGANIZED HEALTH CARE EDUCATION/TRAINING PROGRAM

## 2023-09-06 PROCEDURE — 93010 ELECTROCARDIOGRAM REPORT: CPT | Performed by: STUDENT IN AN ORGANIZED HEALTH CARE EDUCATION/TRAINING PROGRAM

## 2023-09-06 PROCEDURE — 71046 X-RAY EXAM CHEST 2 VIEWS: CPT

## 2023-09-06 PROCEDURE — 70496 CT ANGIOGRAPHY HEAD: CPT

## 2023-09-06 RX ORDER — HYDROMORPHONE HYDROCHLORIDE 1 MG/ML
0.5 INJECTION, SOLUTION INTRAMUSCULAR; INTRAVENOUS; SUBCUTANEOUS
Status: COMPLETED | OUTPATIENT
Start: 2023-09-06 | End: 2023-09-06

## 2023-09-06 RX ORDER — IOPAMIDOL 755 MG/ML
500 INJECTION, SOLUTION INTRAVASCULAR ONCE
Status: COMPLETED | OUTPATIENT
Start: 2023-09-07 | End: 2023-09-07

## 2023-09-06 RX ADMIN — SODIUM CHLORIDE 1000 ML: 9 INJECTION, SOLUTION INTRAVENOUS at 23:27

## 2023-09-06 RX ADMIN — HYDROMORPHONE HYDROCHLORIDE 0.5 MG: 1 INJECTION, SOLUTION INTRAMUSCULAR; INTRAVENOUS; SUBCUTANEOUS at 23:27

## 2023-09-06 ASSESSMENT — ACTIVITIES OF DAILY LIVING (ADL): ADLS_ACUITY_SCORE: 35

## 2023-09-06 NOTE — Clinical Note
Radha Beckwith was seen and treated in our emergency department on 9/6/2023.  She may return to work on 09/09/2023.       If you have any questions or concerns, please don't hesitate to call.      Jayjay Pérez MD

## 2023-09-07 ENCOUNTER — VIRTUAL VISIT (OUTPATIENT)
Dept: PALLIATIVE MEDICINE | Facility: CLINIC | Age: 48
End: 2023-09-07
Payer: COMMERCIAL

## 2023-09-07 ENCOUNTER — TELEPHONE (OUTPATIENT)
Dept: FAMILY MEDICINE | Facility: CLINIC | Age: 48
End: 2023-09-07

## 2023-09-07 DIAGNOSIS — M53.3 SI (SACROILIAC) JOINT DYSFUNCTION: ICD-10-CM

## 2023-09-07 DIAGNOSIS — M54.10 RADICULAR PAIN OF UPPER EXTREMITY: ICD-10-CM

## 2023-09-07 DIAGNOSIS — M54.6 CHRONIC BILATERAL THORACIC BACK PAIN: ICD-10-CM

## 2023-09-07 DIAGNOSIS — G89.29 CHRONIC BILATERAL LOW BACK PAIN WITH BILATERAL SCIATICA: ICD-10-CM

## 2023-09-07 DIAGNOSIS — M54.41 CHRONIC BILATERAL LOW BACK PAIN WITH BILATERAL SCIATICA: ICD-10-CM

## 2023-09-07 DIAGNOSIS — M79.18 MYOFASCIAL PAIN: ICD-10-CM

## 2023-09-07 DIAGNOSIS — G89.29 CHRONIC BILATERAL THORACIC BACK PAIN: ICD-10-CM

## 2023-09-07 DIAGNOSIS — M54.42 CHRONIC BILATERAL LOW BACK PAIN WITH BILATERAL SCIATICA: ICD-10-CM

## 2023-09-07 DIAGNOSIS — M54.2 CHRONIC NECK PAIN: Primary | ICD-10-CM

## 2023-09-07 DIAGNOSIS — M53.3 PAIN OF BOTH SACROILIAC JOINTS: ICD-10-CM

## 2023-09-07 DIAGNOSIS — G89.29 CHRONIC NECK PAIN: Primary | ICD-10-CM

## 2023-09-07 LAB
ALBUMIN SERPL BCG-MCNC: 4.2 G/DL (ref 3.5–5.2)
ALP SERPL-CCNC: 77 U/L (ref 35–104)
ALT SERPL W P-5'-P-CCNC: 22 U/L (ref 0–50)
ANION GAP SERPL CALCULATED.3IONS-SCNC: 15 MMOL/L (ref 7–15)
AST SERPL W P-5'-P-CCNC: 25 U/L (ref 0–45)
BILIRUB SERPL-MCNC: 0.2 MG/DL
BUN SERPL-MCNC: 12 MG/DL (ref 6–20)
CALCIUM SERPL-MCNC: 9.1 MG/DL (ref 8.6–10)
CHLORIDE SERPL-SCNC: 98 MMOL/L (ref 98–107)
CREAT SERPL-MCNC: 0.76 MG/DL (ref 0.51–0.95)
CRP SERPL-MCNC: 5.9 MG/L
DEPRECATED HCO3 PLAS-SCNC: 25 MMOL/L (ref 22–29)
EGFRCR SERPLBLD CKD-EPI 2021: >90 ML/MIN/1.73M2
FLUAV RNA SPEC QL NAA+PROBE: NEGATIVE
FLUBV RNA RESP QL NAA+PROBE: NEGATIVE
GLUCOSE SERPL-MCNC: 116 MG/DL (ref 70–99)
HCG SERPL QL: NEGATIVE
LIPASE SERPL-CCNC: 19 U/L (ref 13–60)
POTASSIUM SERPL-SCNC: 3.1 MMOL/L (ref 3.4–5.3)
PROT SERPL-MCNC: 7 G/DL (ref 6.4–8.3)
RSV RNA SPEC NAA+PROBE: NEGATIVE
SARS-COV-2 RNA RESP QL NAA+PROBE: NEGATIVE
SODIUM SERPL-SCNC: 138 MMOL/L (ref 136–145)
TROPONIN T SERPL HS-MCNC: <6 NG/L
TSH SERPL DL<=0.005 MIU/L-ACNC: 2.58 UIU/ML (ref 0.3–4.2)

## 2023-09-07 PROCEDURE — 250N000009 HC RX 250: Performed by: STUDENT IN AN ORGANIZED HEALTH CARE EDUCATION/TRAINING PROGRAM

## 2023-09-07 PROCEDURE — 96361 HYDRATE IV INFUSION ADD-ON: CPT | Performed by: STUDENT IN AN ORGANIZED HEALTH CARE EDUCATION/TRAINING PROGRAM

## 2023-09-07 PROCEDURE — 99214 OFFICE O/P EST MOD 30 MIN: CPT | Mod: VID

## 2023-09-07 PROCEDURE — 250N000011 HC RX IP 250 OP 636: Performed by: STUDENT IN AN ORGANIZED HEALTH CARE EDUCATION/TRAINING PROGRAM

## 2023-09-07 RX ORDER — FAMOTIDINE 40 MG/1
40 TABLET, FILM COATED ORAL 2 TIMES DAILY PRN
Qty: 180 TABLET | Refills: 3 | OUTPATIENT
Start: 2023-09-07

## 2023-09-07 RX ORDER — OXYCODONE AND ACETAMINOPHEN 5; 325 MG/1; MG/1
1 TABLET ORAL EVERY 6 HOURS PRN
Qty: 12 TABLET | Refills: 0 | Status: SHIPPED | OUTPATIENT
Start: 2023-09-07 | End: 2023-09-10

## 2023-09-07 RX ORDER — PREDNISONE 20 MG/1
TABLET ORAL
Qty: 10 TABLET | Refills: 0 | Status: SHIPPED | OUTPATIENT
Start: 2023-09-07 | End: 2023-10-31

## 2023-09-07 RX ORDER — GABAPENTIN 300 MG/1
CAPSULE ORAL
Qty: 90 CAPSULE | Refills: 1 | Status: SHIPPED | OUTPATIENT
Start: 2023-09-07 | End: 2023-10-04

## 2023-09-07 RX ORDER — FAMOTIDINE 40 MG/1
40 TABLET, FILM COATED ORAL 2 TIMES DAILY
Qty: 60 TABLET | Refills: 1 | Status: SHIPPED | OUTPATIENT
Start: 2023-09-07 | End: 2024-05-14

## 2023-09-07 RX ADMIN — IOPAMIDOL 80 ML: 755 INJECTION, SOLUTION INTRAVENOUS at 00:28

## 2023-09-07 RX ADMIN — SODIUM CHLORIDE 100 ML: 9 INJECTION, SOLUTION INTRAVENOUS at 00:28

## 2023-09-07 ASSESSMENT — PAIN SCALES - PAIN ENJOYMENT GENERAL ACTIVITY SCALE (PEG)
INTERFERED_ENJOYMENT_LIFE: 9
INTERFERED_GENERAL_ACTIVITY: 9
AVG_PAIN_PASTWEEK: 8
PEG_TOTALSCORE: 8.67

## 2023-09-07 ASSESSMENT — PAIN SCALES - GENERAL: PAINLEVEL: EXTREME PAIN (8)

## 2023-09-07 ASSESSMENT — ACTIVITIES OF DAILY LIVING (ADL): ADLS_ACUITY_SCORE: 35

## 2023-09-07 NOTE — PROGRESS NOTES
Linette is a 47 year old who is being evaluated via a billable video visit.      How would you like to obtain your AVS? MyChart  If the video visit is dropped, the invitation should be resent by: Text to cell phone: [phone]  Will anyone else be joining your video visit? No  Is Pt currently in [place]? Yes    Sunil Dorman MA      NOTE:  If Pt is not in [place], Appointment needs to be canceled and rescheduled.

## 2023-09-07 NOTE — DISCHARGE INSTRUCTIONS
The exact cause for your worsened pain is somewhat unclear.  The CT scans of your head, neck, back did not show any obvious new problems.  Your inflammatory markers are very mildly elevated but this is nonspecific.  I think you will be best served by further evaluation with your spine specialist and primary care physician.  In the meantime I would continue taking your prescribed muscle relaxing medication and Suboxone.  I did provide a short course of Percocet which may or may not help with breakthrough pain.  I will also prescribe some steroids to treat any possible component of nerve irritation.  Please follow-up with your pain specialist tomorrow as scheduled.  Return to the emergency department in the meantime for any new or acutely worsened symptoms.

## 2023-09-07 NOTE — PATIENT INSTRUCTIONS
Pain Physical Therapy:  YES   She is working with [facility] PT, though she noted at prior visit therapy has been somewhat limited due to hypersensitivity to touch.  Recommend continue working with pain PT, will reassess for benefit at follow-up.     Pain Psychologist to address relaxation, behavioral change, coping style, and other factors important to improvement.  NO     Diagnostic Studies:  Reviewed cervical CT from yesterday - mild discogenic pathology at C4-5, otherwise unremarkable.      Medication Management:   Start gabapentin 300 mg at bedtime and titrate to goal dose of 300 mg three times daily, per instructions on prescription bottle. Monitor for changes in baseline pain levels, intensity of fluctuations, and improvements in sensitivity to light touch. Will consider dosage increase at follow up, pending outcome with initial dosing.   Continue 20 mg twice daily.  She reports small improvement after starting baclofen. Will plan to continue current dose for now.   Flexeril - continue 5-10 mg twice daily as needed. She takes in morning and afternoon, appreciates benefit for significant myofascial tightness.   Allow 4-6 hours in between all muscle relaxant doses, avoid concurrent alcohol use.      Potential procedures:   She had bilateral SI joint injections with Dr. Reynaga on 5/16/2023.  She reports significant benefit for a month or so, then noticed pain starting to return, though still appreciated some benefit as of last visit. May repeat every 3+ months.   Consider C7-T1 epidural steroid injection in future, pending outcome with gabapentin.      Other Orders/Referrals:   Recommend using TENS unit a few times throughout the day as needed, particularly when engaging in activities that may increase pain.      Follow up with JEANNIE Schroeder CNP on 10/4/23 at 1 pm for in person visit.     ----------------------------------------------------------------  Clinic Number:  [phone]   Call with any  questions about your care and for scheduling assistance.   Calls are returned Monday through Friday between 8 AM and 4:30 PM. We usually get back to you within 2 business days depending on the issue/request.    If we are prescribing your medications:  For opioid medication refills, call the clinic or send a [employer] message 7 days in advance.  Please include:  Name of requested medication  Name of the pharmacy.  For non-opioid medications, call your pharmacy directly to request a refill. Please allow 3-4 days to be processed.   Per [place] State Law:  All controlled substance prescriptions must be filled within 30 days of being written.    For those controlled substances allowing refills, pickup must occur within 30 days of last fill.      We believe regular attendance is key to your success in our program!    Any time you are unable to keep your appointment we ask that you call us at least 24 hours in advance to cancel.This will allow us to offer the appointment time to another patient.   Multiple missed appointments may lead to dismissal from the clinic.

## 2023-09-07 NOTE — TELEPHONE ENCOUNTER
Patient calling reports having ongoing brain fog, numbness to the arms with neck pain and headache that have been worsening over the past few weeks. Patient reports ongoing confusion that has been worsening as well. Denies severe weakness and loss of speech. Advised to be seen in the ER with patient agreeable to the plan.     Alba Powell RN 09/06/23 10:18 PM    Health Triage Nurse Advisor      Reason for Disposition   Headache  (and neurologic deficit)    Additional Information   Negative: [1] SEVERE weakness (i.e., unable to walk or barely able to walk, requires support) AND [2] new-onset or worsening   Negative: [1] Weakness (i.e., paralysis, loss of muscle strength) of the face, arm / hand, or leg / foot on one side of the body AND [2] sudden onset AND [3] present now  (Exception: Bell's palsy suspected [i.e., weakness only on one side of the face, developing over hours to days, no other symptoms].)   Negative: [1] Numbness (i.e., loss of sensation) of the face, arm / hand, or leg / foot on one side of the body AND [2] sudden onset AND [3] present now   Negative: [1] Loss of speech or garbled speech AND [2] sudden onset AND [3] present now   Negative: Difficult to awaken or acting confused (e.g., disoriented, slurred speech)   Negative: Sounds like a life-threatening emergency to the triager   Negative: Confusion, disorientation, or hallucinations is main symptom   Negative: Neck pain is main symptom (and having weakness, numbness, or tingling in arm / hand because of neck pain)   Negative: Back pain is main symptom (and having weakness, numbness, or tingling in leg because of back pain)   Negative: Hand pain is main symptom (and having mild weakness, numbness, or tingling in hand related to hand pain)   Negative: Dizziness is main symptom   Negative: Vision loss or change is main symptom   Negative: Followed a head injury within last 3 days   Negative: Followed a neck injury within last 3 days   Negative: [1]  Tingling in both hands and/or feet AND [2] breathing faster than normal AND [3] feels similar to prior panic attack or hyperventilation episode   Negative: Weakness in both sides of the body or weakness all over    Protocols used: Neurologic Deficit-A-AH

## 2023-09-07 NOTE — ED TRIAGE NOTES
Chronic neck and back pain per pt but today is having headache, numbness all over, dizziness/lightheaded and sx are worsening tonight.      Triage Assessment       Row Name 09/06/23 9321       Triage Assessment (Adult)    Airway WDL WDL       Respiratory WDL    Respiratory WDL WDL       Cardiac WDL    Cardiac WDL WDL

## 2023-09-07 NOTE — PROGRESS NOTES
Video-Visit Details    Type of service:  Video Visit     Originating Location (pt. Location): Home    Distant Location (provider location):  On-site  Platform used for Video Visit: Unable to complete video visit - telephone visit rip.       [facility]     Date of visit: 9/7/2023      Assessment:   Radha Beckwith is a 47 year old female with a past medical history significant for chronic bilateral low back pain, depression, SVT, opioid dependence in remission, anxiety, tobacco use, GERD, restless legs who presents with complaints of neck pain, mid and low back.      Low back pain - Onset of pain within last 2 years. Pain is mostly axial, though she does have intermittent radicular leg symptoms, occasionally extends below the knee. On exam, positive facet loading and positive KATHY, sacral thrust and Yeoman's bilaterally. Positive myofascial TTP, negative SLR and neuro exam WNL. Etiology is likely associated with multiple factors, including underlying degenerative changes of lumbar spine, facet and SIJ mediated components, with prominent overlying myofascial component.   Neck/thoracic back pain - Onset of pain within last 2 years. On exam, positive for myofascial TTP, most notably in lower thoracic paraspinals. Etiology is likely associated with multiple factors, including underlying degenerative changes, with prominent overlying myofascial component.     Visit Diagnoses:  1. Chronic neck pain    2. Radicular pain of upper extremity    3. Myofascial pain    4. Chronic bilateral low back pain with bilateral sciatica    5. Chronic bilateral thoracic back pain    6. Pain of both sacroiliac joints    7. SI (sacroiliac) joint dysfunction        Plan:  Diagnosis reviewed, treatment option addressed, and risk/benifits discussed.  Self-care instructions given.  I am recommending a multidisciplinary treatment plan to help this patient better manage their pain.      Pain Physical Therapy:   YES   She is working with [facility], though she noted at prior visit therapy has been somewhat limited due to hypersensitivity to touch.  Recommend continue working with pain PT, will reassess for benefit at follow-up.     Pain Psychologist to address relaxation, behavioral change, coping style, and other factors important to improvement.  NO     Diagnostic Studies:  Reviewed cervical CT from yesterday - mild discogenic pathology at C4-5, otherwise unremarkable.      Medication Management:   Start gabapentin 300 mg at bedtime and titrate to goal dose of 300 mg three times daily, per instructions on prescription bottle. Monitor for changes in baseline pain levels, intensity of fluctuations, and improvements in sensitivity to light touch. Will consider dosage increase at follow up, pending outcome with initial dosing.   Continue 20 mg twice daily.  She reports small improvement after starting baclofen. Will plan to continue current dose for now.   Flexeril - continue 5-10 mg twice daily as needed. She takes in morning and afternoon, appreciates benefit for significant myofascial tightness.   Allow 4-6 hours in between all muscle relaxant doses, avoid concurrent alcohol use.      Potential procedures:   She had bilateral SI joint injections with Dr. Reynaga on 5/16/2023.  She reports significant benefit for a month or so, then noticed pain starting to return, though still appreciated some benefit as of last visit. May repeat every 3+ months.   Consider C7-T1 epidural steroid injection in future, pending outcome with gabapentin.      Other Orders/Referrals:   Recommend using TENS unit a few times throughout the day as needed, particularly when engaging in activities that may increase pain.      Follow up with JEANNIE Schroeder CNP on 10/4/23 at 1 pm for in person visit.       Review of Electronic Chart: Today I have also reviewed available medical information in the patient's medical record at [facility]  ([facility]) and Care Everywhere (if available), including relevant provider notes, laboratory work, and imaging.     Jenny Landrum, NAOMI, APRN, AGNP-C  [facility]     -------------------------------------------------    Subjective:    Chief complaint:   Chief Complaint   Patient presents with    Pain       Interval history:  Radha Beckwith is a 47 year old female last seen on 7/13/23.  They are a patient of mine seen in follow up.     Recommendations/plan at the last visit included:  Pain Physical Therapy:  YES   She is working with [facility], though she notes due to hypersensitivity to touch, therapy has been somewhat limited.  She notes she is doing recommended stretches at home, no substantial improvement yet.  Recommend continue working with pain PT, will reassess for benefit at follow-up.     Pain Psychologist to address relaxation, behavioral change, coping style, and other factors important to improvement.  NO     Diagnostic Studies:  Reviewed lumbar MRI from 2022 - multilevel degenerative changes.      Medication Management:   Increase baclofen to 20 mg twice daily.  She reports small improvement after starting baclofen at last visit.  Though she continues to have significant neck and shoulder myofascial tightness.  Advised we will trial increase to 20 mg twice daily, then if no change in benefit, we will plan to taper back down and possibly off medication, may consider alternatives at this point.  No side effects.  Updated prescription sent into pharmacy today.  Change flexeril - continue 5-10 mg twice daily as needed. She takes in morning and afternoon, appreciates benefit for significant myofascial tightness.   Allow 4-6 hours in between all muscle relaxant doses, avoid concurrent alcohol use.      Potential procedures:   She had bilateral SI joint injections with Dr. Reynaga on 5/16/2023.  She reports significant benefit for a month or so, then noticed pain starting to return,  "though she thinks she still appreciates some benefit, notes somewhat lower intensity of low back pain.      Other Orders/Referrals:   Recommend using TENS unit a few times throughout the day as needed, particularly when engaging in activities that may increase pain.  She reports using a little bit more since last visit, but notes she knows she could be using more.  Advised to take to pain PT for assistance with device as needed.     Follow up with JEANNIE Schroeder CNP in 8 weeks or sooner if needed.     Since her last visit, Radha MASSEY Amena reports:  -her pain is worse than it was at last visit.   -She reports neck pain has worsened, also having \"new\" pain in mid back that is constant.   -She does not notice any change in pain levels with bending/twisting.   -She had ED visit yesterday, largely unremarkable workup.   -She reports neck pain has been worsening since July when she saw pain PT.   -She continues to work with pain PT with [facility], last visit 9/5/23.   -She states she was doing some of the PT exercises wrong and was finally corrected at this last pain PT visit.   -She reports she went to chiropractor last week, had massage and adjustment, no change in pain levels.   -She is having pain radiating into BUE,   -Cervical CT yesterday demonstrated mild C4-5 discogenic pathology.  -She reports having ringing in ears since June.   -She states she is keeping diary of pain symptoms.   -She is open to trial gabapentin.   -She tried Lyrica in the past that caused weird dreams.       Pain Information:   Pain rating: averages 8/10 on a 0-10 scale.      Interval history from last visit on 7/13/23:  -her pain is about the same as it was at last visit in neck, low back is improved since SI joint to an extent.   -She was referred to pain PT at last visit, has been seeing [facility].   -She states she is \"extremely sensitive,\" so touching is limited during visits.   -She is doing some neck stretching. "   -She has not appreciated profound difference from pain PT yet.   -She had SI joint injection with Dr. Reynaga on 5/16/23.  -She reports substantial improvement in pain for about a month, then started to have some pain symptoms return.   -She continued to appreciate some benefit from injection for a while longer, less intensity of pain.   -She notes she had migraines x 2 weeks, which she wonders if is attributed to stress of having her son home.   -She also had teeth removal.   -She is taking baclofen 10 mg BID  -She has not appreciated much improvement in neck and low back myofascial pain.   -She continues to take flexeril as well, notes this is helpful when she is very stiff in morning.   -She is open to baclofen increase.   -She understands she needs to space out muscle relaxants doses by 4 hours.   -She is using TENS unit a little bit, will be taking to pain PT.   Pain Information:              Pain rating: averages 7/10 on a 0-10 scale.           HPI from initial visit with me on 4/26/23:  Radha Beckwith is a 47 year old female presents with a chief complaint of neck pain/upper back and radicular low back pain.      The pain has been present for 1.5-2 years current pain episode.    The pain is Severe Pain (6) in severity.    The pain is described as dull ache in mid back, cramping, sharp, pressure, throbbing, burning in low back, radicular pain into hips, legs, butt, stops above knee.   The pain is alleviated by position changes, massage/heat device, exercise.    It is exacerbated by prolonged sitting, pain is constant but fluctuates with activity, prolonged standing and walking.    Modalities that have been utilized in the past which were helpful include PT.    Things that were not helpful, but tried ,include back brace, LESI x 1, TPI (?), TENS unit (OTC from [facility]).    The patient has never tried pain PT, SI joint/facet.  The patient otherwise denies bowel or bladder incontinence, parasthesias,  weakness, saddle anesthesia, unintentional weight loss, or fever/chills/sweats.   Radha Beckwith has been seen at a pain clinic in the past.  She sees addiction med in [place], [facility] with Dr. Ivory.   -She is not currently working due to pain, off work for 1.5 years.   -She looked into disability but was advised by  that they would not take on her case.   -She had L5-S1 JASE 7/2022 with Dr. Ivory in [place].   -She saw neurosurgery in the past as well in 2021.   -She saw pain provider through [facility], did not care for provider.   -She has 1 kid, 25 year old boy, [employer] and .         Current Pain Treatments:    Medications:               Suboxone 2-0.5 mg daily (weaning off)              Duloxetine 30 mg BID              Ropinirole 1 mg at bedtime               Cyclobenzaprine 5-10 mg BID PRN               Baclofen 20 mg BID     2. Other therapies:               TENS              Pain PT        Current MME: N/A     Review of [place] Prescription Monitoring Program (): No concern for abuse or misuse of controlled medications based on this report. Reviewed - appears appropriate.      Past pain treatments:  SI joint injection 5/16/23 -significant benefit x1 month, then diminishing efficacy      Medications:  Current Outpatient Medications   Medication Sig Dispense Refill    acetaminophen (TYLENOL) 500 MG tablet Take 1,000 mg by mouth every 6 hours as needed for mild pain      ALPRAZolam (XANAX) 0.5 MG tablet TAKE 1 TABLET (0.5 MG) BY MOUTH TWO TIMES A DAY 60 tablet 0    baclofen (LIORESAL) 20 MG tablet TAKE 1 TABLET (20 MG) BY MOUTH 2 TIMES DAILY 60 tablet 0    bisacodyl (DULCOLAX) 5 MG EC tablet Take 1 tablet (5 mg) by mouth every other day Take every other day. If no bowel movement for 2 or more days, take 2 tablets of bisacodyl (10 mg) 30 tablet 3    buprenorphine HCl-naloxone HCl (SUBOXONE) 2-0.5 MG per film Place 0.5 Film under the tongue daily       cetirizine (ZYRTEC) 10  MG tablet TAKE ONE TABLET BY MOUTH EVERY MORNING (Patient taking differently: Take 10 mg by mouth daily) 90 tablet 3    chlorhexidine (PERIDEX) 0.12 % solution Swish and spit 30 mLs in mouth 2 times daily      cyclobenzaprine (FLEXERIL) 5 MG tablet TAKE ONE TO TWO TABLETS BY MOUTH THREE TIMES A DAY AS NEEDED FOR MUSCLE SPASMS 90 tablet 5    DULoxetine (CYMBALTA) 30 MG capsule TAKE ONE CAPSULE BY MOUTH TWICE A  capsule 0    famotidine (PEPCID) 40 MG tablet TAKE 1 TABLET (40 MG) BY MOUTH 2 TIMES DAILY 60 tablet 1    gabapentin (NEURONTIN) 300 MG capsule Start 300 mg at bedtime x 1-2 days, then increase to 300 mg BID x 3 days, then increase to 300 mg TID. 90 capsule 1    lactulose (CHRONULAC) 10 GM/15ML solution Take 15 mLs (10 g) by mouth 3 times daily as needed for constipation (as needed for severe constipaiton, no BM for more than 3 days and feeling constipated) 300 mL 3    ondansetron (ZOFRAN ODT) 4 MG ODT tab DISSOLVE ONE TABLET ON TONGUE EVERY SIX HOURS AS NEEDED FOR NAUSEA 20 tablet 1    oxyCODONE-acetaminophen (PERCOCET) 5-325 MG tablet Take 1 tablet by mouth every 6 hours as needed for pain 12 tablet 0    pantoprazole (PROTONIX) 40 MG EC tablet Take 1 tablet (40 mg) by mouth 2 times daily (before meals) 60 tablet 5    polyethylene glycol (MIRALAX) 17 GM/Dose powder Take 17 g by mouth daily 578 g 3    predniSONE (DELTASONE) 20 MG tablet Take two tablets (= 40mg) each day for 5 (five) days 10 tablet 0    rOPINIRole (REQUIP) 1 MG tablet TAKE ONE TABLET BY MOUTH EVERY NIGHT AT BEDTIME 90 tablet 0    simvastatin (ZOCOR) 10 MG tablet TAKE ONE TABLET BY MOUTH EVERY NIGHT AT BEDTIME 30 tablet 0    traZODone (DESYREL) 50 MG tablet Take 1-2 tablets ( mg) by mouth nightly as needed for sleep 120 tablet 1    zolpidem (AMBIEN) 5 MG tablet TAKE ONE TABLET (5 MG) BY MOUTH NIGHTLY AS NEEDED FOR SLEEP 30 tablet 0       Medical History: any changes in medical history since they were last seen?  No      Objective:    Physical Exam:  not currently breastfeeding.  healthy, alert, and no distress  PSYCH: Alert and oriented times 3; coherent speech, normal   rate and volume, able to articulate logical thoughts, able   to abstract reason, no tangential thoughts, no hallucinations   or delusions  Her affect is normal  RESP: No cough, no audible wheezing, able to talk in full sentences  Remainder of exam unable to be completed due to telephone visits    Diagnostic Tests/Imaging/Labs:  EXAM: CT HEAD W/O CONTRAST, CT CERVICAL SPINE W/O CONTRAST  LOCATION: [facility]  DATE: 9/7/2023     INDICATION: Headache; chronic HA (>= 3 months), no complicating feature. Chronic neck and back pain. Numbness all over. Dizziness. Lightheadedness. Worsening symptoms.  COMPARISON: None.  TECHNIQUE:   1) Routine CT Head without IV contrast. Multiplanar reformats. Dose reduction techniques were used.  2) Routine CT Cervical Spine without IV contrast. Multiplanar reformats. Dose reduction techniques were used.     FINDINGS:   HEAD CT:   INTRACRANIAL CONTENTS: No intracranial hemorrhage, extraaxial collection, or mass effect. No CT evidence of acute infarct. Normal parenchymal attenuation. Normal ventricles and sulci.      VISUALIZED ORBITS/SINUSES/MASTOIDS: No intraorbital abnormality. No paranasal sinus mucosal disease. Trace right mastoid effusion.     BONES/SOFT TISSUES: No calvarial fracture.     CERVICAL SPINE CT:   VERTEBRA: Normal vertebral body heights. Mild reversal of the normal cervical lordosis. No fracture or posttraumatic subluxation.      CANAL/FORAMINA: At C4-C5 there is a small left of midline disc protrusion which causes mild left hemicanal stenosis. No high-grade central spinal canal stenosis. There is mild right neural foraminal stenosis at C4-C5. No high-grade neural foraminal   stenosis.     PARASPINAL: No extraspinal abnormality. Mild upper lobe paraseptal emphysema.                                                                       IMPRESSION:  HEAD CT:  1.  No definite acute intracranial abnormality. If the patient is experiencing an acute, focal, or ongoing neurologic deficit, an MRI may be indicated.      CERVICAL SPINE CT:  1.  No CT evidence for acute fracture or post traumatic subluxation.  2.  Degenerative changes, as above. Consider follow-up cervical spine MRI as clinically indicated.    BILLING TIME DOCUMENTATION:   The total TIME spent on this patient on the date of the encounter/appointment was 38 minutes.      TOTAL TIME includes:   Time spent preparing to see the patient (reviewing records and tests)   Time spent face to face (or over the phone) with the patient   Time spent ordering tests, medications, procedures and referrals   Time spent Referring and communicating with other healthcare professionals   Time spent documenting clinical information in Epic

## 2023-09-07 NOTE — ED PROVIDER NOTES
History     Chief Complaint   Patient presents with    Numbness    Headache     HPI  Radha Beckwith is a 47 year old female with history of depression, anxiety, chronic neck and back pain, opiate dependence on Suboxone presents for evaluation of multiple complaints including neck discomfort and numbness.  Patient reports ongoing issues with her neck and back.  She has been evaluated on multiple occasions and has received lumbar steroid injections in the past, most recently about 6 months ago.  Patient follows with a spine specialist and actually has an appointment scheduled for tomorrow.  However, over the last several days she reports significantly worsened neck discomfort.  This is associated with very intense mostly right-sided headache.  She has a history of migraines as a teenager but states this pain is much more intense and different in nature than those episodes.  Patient further describes some intermittent tingling down both arms that is more acute on the right side.  There is lesser tingling to the right ankle/foot region.  The symptoms have been present off and on for several days.  With the headache she also describes some blurriness of the right eye at times.  The patient also describes generalized weakness/fatigue but otherwise denies recent fever, body aches, cough or cold symptoms, vomiting, changes in bowel or urinary habits, saddle anesthesia, focal numbness/tingling/weakness, incontinence, other complaints today.          Allergies:  Allergies   Allergen Reactions    Ergotamine-Caffeine      12-            GI problems-    Seasonal Allergies     Sumatriptan      vomits after giving herself a shot    Compazine Anxiety    Droperidol Anxiety    Nubain [Nalbuphine Hcl] Anxiety    Prochlorperazine Palpitations     Uncontrolled movement       Problem List:    Patient Active Problem List    Diagnosis Date Noted    Chronic midline low back pain without sciatica 06/15/2023     Priority: Medium     Chronic neck pain 06/15/2023     Priority: Medium    Moderate episode of recurrent major depressive disorder (H) 06/29/2022     Priority: Medium    Moderate major depression (H) 06/03/2019     Priority: Medium    Supraventricular tachycardia (H) 06/03/2019     Priority: Medium    Psychophysiological insomnia 12/30/2017     Priority: Medium    Chronic bilateral low back pain without sciatica 12/30/2017     Priority: Medium    Opioid dependence in remission (H) 08/02/2015     Priority: Medium     On suboxone treatement with Bryant Harkins.  (Noted in 2015).        Anxiety attack 07/31/2015     Priority: Medium    Hypokalemia 06/23/2015     Priority: Medium    Tobacco abuse 06/23/2015     Priority: Medium    Hyperlipidemia LDL goal <100 01/29/2014     Priority: Medium    Anxiety 08/19/2013     Priority: Medium    GERD (gastroesophageal reflux disease) 07/28/2010     Priority: Medium     Takes omeprazole and metoclopramide      Restless legs 07/28/2010     Priority: Medium        Past Medical History:    Past Medical History:   Diagnosis Date    Abdominal pain, right lower quadrant 03/09/2008    Anxiety attack 07/31/2015    Atypical chest pain 06/23/2015    De Quervain's disease (tenosynovitis)     Dehydration     Gastric ulcer 07/31/2015    GERD (gastroesophageal reflux disease) 07/28/2010    Ingrowing nail 01/09/2014    Migraines     Opioid dependence in remission (H) 08/02/2015    Other and unspecified ovarian cyst     Papanicolaou smear of cervix with low grade squamous intraepithelial lesion (LGSIL) 07/07/2017       Past Surgical History:    Past Surgical History:   Procedure Laterality Date    BIOPSY CERVICAL, LOCAL EXCISION, SINGLE/MULTIPLE N/A 8/10/2017    Procedure: BIOPSY CERVICAL, LOCAL EXCISION, SINGLE/MULTIPLE;;  Surgeon: Michael Chandler MD;  Location: [facility]    COLONOSCOPY N/A 1/6/2023    Procedure: COLONOSCOPY;  Surgeon: Michael Dozier MD;  Location:  GI    COLPOSCOPY,  BIOPSY, COMBINED N/A 8/10/2017    Procedure: COMBINED COLPOSCOPY, BIOPSY;  Colposcopy with Cervical Biopsies and Endometrial Biopsy, Exam with Ultrasound;  Surgeon: Michael Chandler MD;  Location: [facility]    ESOPHAGOSCOPY, GASTROSCOPY, DUODENOSCOPY (EGD), COMBINED N/A 4/17/2017    Procedure: COMBINED ESOPHAGOSCOPY, GASTROSCOPY, DUODENOSCOPY (EGD);  Surgeon: Ibrahima Esposito MD;  Location: [facility]    ESOPHAGOSCOPY, GASTROSCOPY, DUODENOSCOPY (EGD), COMBINED N/A 1/6/2023    Procedure: ESOPHAGOGASTRODUODENOSCOPY, WITH BIOPSY;  Surgeon: Michael Dozier MD;  Location: [facility]    EXAM UNDER ANESTHESIA PELVIC N/A 8/10/2017    Procedure: EXAM UNDER ANESTHESIA PELVIC;;  Surgeon: Michael Chandler MD;  Location: [facility]    HYSTERECTOMY      HYSTERECTOMY, PAP NO LONGER INDICATED      INJECT EPIDURAL LUMBAR Bilateral 7/1/2022    Procedure: Lumbar 5-Sacral 1 Transforaminal Epidural Steroid Injection with fluoroscopic guidance and contrast, bilateral;  Surgeon: Trevor Ivory MD;  Location: [facility]    LAPAROSCOPIC CHOLECYSTECTOMY N/A 2/2/2018    Procedure: LAPAROSCOPIC CHOLECYSTECTOMY;  Laparoscopic Cholecystectomy;  Surgeon: Tigre Lowry DO;  Location: [facility]    LAPAROSCOPIC HYSTERECTOMY TOTAL N/A 10/30/2017    Procedure: LAPAROSCOPIC HYSTERECTOMY TOTAL;  LAPAROSCOPIC HYSTERECTOMY TOTAL POSSIBLE SALPINGO-OOPHERECTOMY (BILATERAL);  Surgeon: Michael Chandler MD;  Location: [facility]    ZZHC UGI ENDOSCOPY, SIMPLE EXAM  01/07/08       Family History:    Family History   Problem Relation Age of Onset    Depression Mother     Respiratory Mother     Chronic Obstructive Pulmonary Disease Mother     Cerebrovascular Disease Father         Brain anyeurism    Breast Cancer Cousin     Adrenal Disorder Other     Chronic Obstructive Pulmonary Disease Other        Social History:  Marital Status:  Single [1]  Social History     Tobacco Use    Smoking status: Some Days     Packs/day: 0.25     Years: 20.00     Pack  years: 5.00     Types: Cigarettes    Smokeless tobacco: Never   Vaping Use    Vaping Use: Some days    Substances: Nicotine, CBD    Devices: [employer] and [employer] tank   Substance Use Topics    Alcohol use: Yes     Comment: occasional drinks; about 5-6 beers/week    Drug use: No        Medications:    oxyCODONE-acetaminophen (PERCOCET) 5-325 MG tablet  predniSONE (DELTASONE) 20 MG tablet  acetaminophen (TYLENOL) 500 MG tablet  ALPRAZolam (XANAX) 0.5 MG tablet  baclofen (LIORESAL) 20 MG tablet  bisacodyl (DULCOLAX) 5 MG EC tablet  buprenorphine HCl-naloxone HCl (SUBOXONE) 2-0.5 MG per film  cetirizine (ZYRTEC) 10 MG tablet  chlorhexidine (PERIDEX) 0.12 % solution  cyclobenzaprine (FLEXERIL) 5 MG tablet  DULoxetine (CYMBALTA) 30 MG capsule  famotidine (PEPCID) 40 MG tablet  lactulose (CHRONULAC) 10 GM/15ML solution  ondansetron (ZOFRAN ODT) 4 MG ODT tab  pantoprazole (PROTONIX) 40 MG EC tablet  polyethylene glycol (MIRALAX) 17 GM/Dose powder  rOPINIRole (REQUIP) 1 MG tablet  simvastatin (ZOCOR) 10 MG tablet  traZODone (DESYREL) 50 MG tablet  zolpidem (AMBIEN) 5 MG tablet      Review of Systems   All other systems reviewed and are negative.  See HPI.    Physical Exam   BP: (!) 168/91  Pulse: 112  Temp: 97.3  F (36.3  C)  Resp: 18  Weight: 78.9 kg (174 lb)  SpO2: 99 %      Physical Exam  Constitutional:       Appearance: Normal appearance. She is not diaphoretic.      Comments: Patient is laying on her left side on the exam bed.  She initially will not even open her eyes due to reported intense headache.  Patient was ultimately able to open her eyes and change position in bed without significant difficulty after prompting.  Overall she appears anxious and uncomfortable but nontoxic.  She does not appear to have significant photophobia and she does not have any meningismus.   HENT:      Head: Normocephalic and atraumatic.      Nose: No congestion or rhinorrhea.      Mouth/Throat:      Mouth: Mucous membranes are moist.       Pharynx: Oropharynx is clear. No oropharyngeal exudate or posterior oropharyngeal erythema.   Eyes:      General: No scleral icterus.     Extraocular Movements: Extraocular movements intact.      Conjunctiva/sclera: Conjunctivae normal.      Pupils: Pupils are equal, round, and reactive to light.   Neck:      Comments: Patient has very mild diffuse cervical tenderness including over the midline, but no obvious bony abnormality/step-off appreciated.  No overlying skin changes.  No meningismus.  Cardiovascular:      Rate and Rhythm: Normal rate and regular rhythm.      Pulses: Normal pulses.      Heart sounds: Normal heart sounds.   Pulmonary:      Effort: Pulmonary effort is normal. No respiratory distress.      Breath sounds: Normal breath sounds.   Abdominal:      General: Abdomen is flat.      Tenderness: There is no abdominal tenderness.   Musculoskeletal:         General: No swelling or tenderness. Normal range of motion.      Cervical back: Normal range of motion and neck supple. No rigidity.      Right lower leg: No edema.      Left lower leg: No edema.      Comments: No significant lower extremity edema or calf tenderness.  Patient does have a small area of focal tenderness to palpation over the midline to the mid thoracic back region.  Does not have any fullness changes or bony abnormalities at this site.  Otherwise the back is largely nontender.   Skin:     General: Skin is warm.      Capillary Refill: Capillary refill takes less than 2 seconds.      Findings: No rash.   Neurological:      General: No focal deficit present.      Mental Status: She is alert and oriented to person, place, and time.      Cranial Nerves: No cranial nerve deficit.      Sensory: No sensory deficit.      Motor: No weakness.      Coordination: Coordination normal.      Gait: Gait normal.      Comments: Patient did require prompting to complete full neurological assessment.  However, she was able to open her eyes and I see no signs  of meningismus.  She can move all of her extremities spontaneously and has equal strength throughout.  Negative SLR.  Cranial nerve exam is unremarkable.   Psychiatric:      Comments: Patient is a very flat, almost defeated affect.         ED Course             Procedures  EKG performed at 2246.  Sinus tachycardia, rate 102.  Normal axis.  Normal intervals.  Nonspecific T wave changes.  Exam independently interpreted by me.            Results for orders placed or performed during the hospital encounter of 09/06/23 (from the past 24 hour(s))   CBC with platelets differential    Narrative    The following orders were created for panel order CBC with platelets differential.  Procedure                               Abnormality         Status                     ---------                               -----------         ------                     CBC with platelets and d...[[id number]]                      Final result                 Please view results for these tests on the individual orders.   Comprehensive metabolic panel   Result Value Ref Range    Sodium 138 136 - 145 mmol/L    Potassium 3.1 (L) 3.4 - 5.3 mmol/L    Chloride 98 98 - 107 mmol/L    Carbon Dioxide (CO2) 25 22 - 29 mmol/L    Anion Gap 15 7 - 15 mmol/L    Urea Nitrogen 12.0 6.0 - 20.0 mg/dL    Creatinine 0.76 0.51 - 0.95 mg/dL    Calcium 9.1 8.6 - 10.0 mg/dL    Glucose 116 (H) 70 - 99 mg/dL    Alkaline Phosphatase 77 35 - 104 U/L    AST 25 0 - 45 U/L    ALT 22 0 - 50 U/L    Protein Total 7.0 6.4 - 8.3 g/dL    Albumin 4.2 3.5 - 5.2 g/dL    Bilirubin Total 0.2 <=1.2 mg/dL    GFR Estimate >90 >60 mL/min/1.73m2   Lipase   Result Value Ref Range    Lipase 19 13 - 60 U/L   Troponin T, High Sensitivity   Result Value Ref Range    Troponin T, High Sensitivity <6 <=14 ng/L   TSH with free T4 reflex   Result Value Ref Range    TSH 2.58 0.30 - 4.20 uIU/mL   CRP inflammation   Result Value Ref Range    CRP Inflammation 5.90 (H) <5.00 mg/L   Erythrocyte  sedimentation rate auto   Result Value Ref Range    Erythrocyte Sedimentation Rate 21 (H) 0 - 20 mm/hr   Extra Tube    Narrative    The following orders were created for panel order Extra Tube.  Procedure                               Abnormality         Status                     ---------                               -----------         ------                     Extra Blue Top Tube[[id number]]                              Final result               Extra Green Top (Lithium...[[id number]]                      Final result                 Please view results for these tests on the individual orders.   Extra Blue Top Tube   Result Value Ref Range    Hold Specimen jic    Extra Green Top (Lithium Heparin) Tube   Result Value Ref Range    Hold Specimen EXTRA    CBC with platelets and differential   Result Value Ref Range    WBC Count 6.1 4.0 - 11.0 10e3/uL    RBC Count 4.17 3.80 - 5.20 10e6/uL    Hemoglobin 12.4 11.7 - 15.7 g/dL    Hematocrit 36.5 35.0 - 47.0 %    MCV 88 78 - 100 fL    MCH 29.7 26.5 - 33.0 pg    MCHC 34.0 31.5 - 36.5 g/dL    RDW 13.3 10.0 - 15.0 %    Platelet Count 211 150 - 450 10e3/uL    % Neutrophils 48 %    % Lymphocytes 41 %    % Monocytes 7 %    % Eosinophils 2 %    % Basophils 1 %    % Immature Granulocytes 1 %    NRBCs per 100 WBC 0 <1 /100    Absolute Neutrophils 3.0 1.6 - 8.3 10e3/uL    Absolute Lymphocytes 2.5 0.8 - 5.3 10e3/uL    Absolute Monocytes 0.4 0.0 - 1.3 10e3/uL    Absolute Eosinophils 0.1 0.0 - 0.7 10e3/uL    Absolute Basophils 0.0 0.0 - 0.2 10e3/uL    Absolute Immature Granulocytes 0.0 <=0.4 10e3/uL    Absolute NRBCs 0.0 10e3/uL   Symptomatic Influenza A/B, RSV, & SARS-CoV2 PCR (COVID-19) Nose    Specimen: Nose; Swab   Result Value Ref Range    Influenza A PCR Negative Negative    Influenza B PCR Negative Negative    RSV PCR Negative Negative    SARS CoV2 PCR Negative Negative    Narrative    Testing was performed using the Xpert Xpress CoV2/Flu/RSV Assay on the [employer]  Instrument. This test should be ordered for the detection of SARS-CoV-2, influenza, and RSV viruses in individuals who meet clinical and/or epidemiological criteria. Test performance is unknown in asymptomatic patients. This test is for in vitro diagnostic use under the FDA EUA for laboratories certified under CLIA to perform high or moderate complexity testing. This test has not been FDA cleared or approved. A negative result does not rule out the presence of PCR inhibitors in the specimen or target RNA in concentration below the limit of detection for the assay. If only one viral target is positive but coinfection with multiple targets is suspected, the sample should be re-tested with another FDA cleared, approved, or authorized test, if coinfection would change clinical management. This test was validated by the [facility]. These laboratories are certified under the Clinical Laboratory Improvement Amendments of 1988 (CLIA-88) as qualified to perform high complexity laboratory testing.   HCG qualitative Blood   Result Value Ref Range    hCG Serum Qualitative Negative Negative   XR Chest 2 Views    Narrative    EXAM: XR CHEST 2 VIEWS  LOCATION: [facility]  DATE/TIME: 9/7/2023 12:34 AM CDT    INDICATION: Chest discomfort yesterday, fatigue, chills.  COMPARISON: Chest x-ray on 07/01/2015.      Impression    IMPRESSION: PA and lateral views of the chest were obtained. Cardiomediastinal silhouette is within normal limits. No suspicious focal pulmonary opacities. No significant pleural effusion or pneumothorax.       CT Head w/o Contrast    Narrative    EXAM: CT HEAD W/O CONTRAST, CT CERVICAL SPINE W/O CONTRAST  LOCATION: [facility]  DATE: 9/7/2023    INDICATION: Headache; chronic HA (>= 3 months), no complicating feature. Chronic neck and back pain. Numbness all over. Dizziness. Lightheadedness. Worsening symptoms.  COMPARISON:  None.  TECHNIQUE:   1) Routine CT Head without IV contrast. Multiplanar reformats. Dose reduction techniques were used.  2) Routine CT Cervical Spine without IV contrast. Multiplanar reformats. Dose reduction techniques were used.    FINDINGS:   HEAD CT:   INTRACRANIAL CONTENTS: No intracranial hemorrhage, extraaxial collection, or mass effect. No CT evidence of acute infarct. Normal parenchymal attenuation. Normal ventricles and sulci.     VISUALIZED ORBITS/SINUSES/MASTOIDS: No intraorbital abnormality. No paranasal sinus mucosal disease. Trace right mastoid effusion.    BONES/SOFT TISSUES: No calvarial fracture.    CERVICAL SPINE CT:   VERTEBRA: Normal vertebral body heights. Mild reversal of the normal cervical lordosis. No fracture or posttraumatic subluxation.     CANAL/FORAMINA: At C4-C5 there is a small left of midline disc protrusion which causes mild left hemicanal stenosis. No high-grade central spinal canal stenosis. There is mild right neural foraminal stenosis at C4-C5. No high-grade neural foraminal   stenosis.    PARASPINAL: No extraspinal abnormality. Mild upper lobe paraseptal emphysema.      Impression    IMPRESSION:  HEAD CT:  1.  No definite acute intracranial abnormality. If the patient is experiencing an acute, focal, or ongoing neurologic deficit, an MRI may be indicated.     CERVICAL SPINE CT:  1.  No CT evidence for acute fracture or post traumatic subluxation.  2.  Degenerative changes, as above. Consider follow-up cervical spine MRI as clinically indicated.   CT Cervical Spine w/o Contrast    Narrative    EXAM: CT HEAD W/O CONTRAST, CT CERVICAL SPINE W/O CONTRAST  LOCATION: [facility]  DATE: 9/7/2023    INDICATION: Headache; chronic HA (>= 3 months), no complicating feature. Chronic neck and back pain. Numbness all over. Dizziness. Lightheadedness. Worsening symptoms.  COMPARISON: None.  TECHNIQUE:   1) Routine CT Head without IV contrast. Multiplanar  reformats. Dose reduction techniques were used.  2) Routine CT Cervical Spine without IV contrast. Multiplanar reformats. Dose reduction techniques were used.    FINDINGS:   HEAD CT:   INTRACRANIAL CONTENTS: No intracranial hemorrhage, extraaxial collection, or mass effect. No CT evidence of acute infarct. Normal parenchymal attenuation. Normal ventricles and sulci.     VISUALIZED ORBITS/SINUSES/MASTOIDS: No intraorbital abnormality. No paranasal sinus mucosal disease. Trace right mastoid effusion.    BONES/SOFT TISSUES: No calvarial fracture.    CERVICAL SPINE CT:   VERTEBRA: Normal vertebral body heights. Mild reversal of the normal cervical lordosis. No fracture or posttraumatic subluxation.     CANAL/FORAMINA: At C4-C5 there is a small left of midline disc protrusion which causes mild left hemicanal stenosis. No high-grade central spinal canal stenosis. There is mild right neural foraminal stenosis at C4-C5. No high-grade neural foraminal   stenosis.    PARASPINAL: No extraspinal abnormality. Mild upper lobe paraseptal emphysema.      Impression    IMPRESSION:  HEAD CT:  1.  No definite acute intracranial abnormality. If the patient is experiencing an acute, focal, or ongoing neurologic deficit, an MRI may be indicated.     CERVICAL SPINE CT:  1.  No CT evidence for acute fracture or post traumatic subluxation.  2.  Degenerative changes, as above. Consider follow-up cervical spine MRI as clinically indicated.   CT Thoracic Spine w/o Contrast    Narrative    EXAM: CT THORACIC SPINE W/O CONTRAST  LOCATION: [facility]  DATE: 9/7/2023    INDICATION: Midline tenderness to the lower thoracic spine.  COMPARISON: 09/28/2021  TECHNIQUE: Routine CT Thoracic Spine without IV contrast. Multiplanar reformats. Dose reduction techniques were used.     FINDINGS:  VERTEBRA: Stable vertebral body heights and alignment. No fracture or posttraumatic subluxation.     CANAL/FORAMINA: Multilevel  degenerative changes. No significant central spinal canal stenosis. No high-grade neural foraminal stenosis.    PARASPINAL: Paraseptal emphysema. Dependent atelectasis. 4 mm right lower lobe pulmonary granuloma. Mild circumferential soft tissue thickening of the upper esophagus.       Impression    IMPRESSION:  1.  No acute fracture or posttraumatic subluxation.  2.  Paraseptal emphysema.  3.  Mild circumferential soft tissue thickening involving the upper esophagus. Consider follow-up GI consultation.     CTA Head Neck with Contrast    Narrative    EXAM: CTA HEAD NECK W CONTRAST  LOCATION: [facility]  DATE: 9/7/2023    INDICATION: Headache; Chronic HA (>= 3 months); Chronic HA (>= 3 months), no complicating feature  COMPARISON: Head CT on the same date  CONTRAST: 80mL Isovue 370  TECHNIQUE: Head and neck CT angiogram with IV contrast. axial helical CT images of the head and neck vessels obtained during the arterial phase of intravenous contrast administration. Axial 2D reconstructed images and multiplanar 3D MIP reconstructed   images of the head and neck vessels were performed by the technologist. Dose reduction techniques were used. All stenosis measurements made according to NASCET criteria unless otherwise specified.    FINDINGS:     HEAD CTA:  ANTERIOR CIRCULATION: No stenosis/occlusion, aneurysm, or high flow vascular malformation. Standard [place] anatomy.    POSTERIOR CIRCULATION: No stenosis/occlusion, aneurysm, or high flow vascular malformation. Balanced vertebral arteries supply a normal basilar artery.     DURAL VENOUS SINUSES: Expected enhancement of the major dural venous sinuses.    NECK CTA:  RIGHT CAROTID: No measurable stenosis or dissection.    LEFT CAROTID: No measurable stenosis or dissection.    VERTEBRAL ARTERIES: No focal stenosis or dissection. Balanced vertebral arteries.    AORTIC ARCH: Classic aortic arch anatomy with no significant stenosis at  the origin of the great vessels.    NONVASCULAR STRUCTURES: Unremarkable.      Impression    IMPRESSION:     HEAD CTA:   1.  Normal CTA [place].    NECK CTA:  1.  Normal neck CTA.       Medications   0.9% sodium chloride BOLUS (0 mLs Intravenous Stopped 9/7/23 0204)   HYDROmorphone (PF) (DILAUDID) injection 0.5 mg (0.5 mg Intravenous $Given 9/6/23 2327)   iopamidol (ISOVUE-370) solution 500 mL (80 mLs Intravenous $Given 9/7/23 0028)   new 100 ml saline bag (100 mLs Intravenous $Given 9/7/23 0028)       Assessments & Plan (with Medical Decision Making)     I have reviewed the nursing notes.    I have reviewed the findings, diagnosis, plan and need for follow up with the patient.    Medical Decision Making  Radha Beckwith is a 47 year old female with history of depression, anxiety, chronic neck and back pain, opiate dependence on Suboxone presents for evaluation of multiple complaints including neck discomfort and numbness.  Patient was slightly tachycardic and hypertensive on arrival but did appear uncomfortable and anxious.  Overall she is nontoxic despite laying in bed with her eyes closed.  Patient was ultimately able to change positions in bed without significant difficulty.  I do not appreciate any focal neurological deficits.  Cranial nerve exam is grossly unremarkable.  There are no focal areas of point tenderness to the head or face and no overlying skin changes.  She had mild diffuse tenderness to the cervical spine and also an area of point tenderness to the midline of her mid thoracic region, again without any evidence of bony abnormalities.  She moves all of her extremities spontaneously and with equal/full strength throughout.  She has no meningismus on exam and she is afebrile.  Patient denies red flag symptoms such as saddle anesthesia, incontinence, focal weakness.  The etiology of her symptoms is somewhat unclear, may represent a migraine, viral illness, chronic neck/back pain with some  "new component of radiculopathy.  I do also have some degree of concern for drug-seeking behavior in the setting of acute on chronic pain.  During our initial conversations she heavily emphasized that her headache was her main complaint/concern.  However, when I suggested trying a migraine cocktail she declined a series of medications stating that they make her heart \"go crazy\".  After stating that narcotic pain medications are generally not indicated for management of headaches she described greater concern for her neck pain and numbness.  Because she has several areas of discomfort I did agree to a one-time dose of Dilaudid in the emergency department and she did have significant improvement in pain.    Work-up was very reassuring overall.  She has no leukocytosis or anemia.  Electrolytes and thyroid studies were unremarkable.  Viral testing was negative.  EKG showed no acute ischemic changes and troponin was negative.  Inflammatory markers were minimally elevated.  Chest x-ray was clear.  CT/CTA of the head and neck showed no obvious vascular or intracranial abnormalities.  She has some degenerative changes of the cervical spine without obvious fracture.  CT of the thoracic spine showed no explanation for her midline tenderness as described above.  She was incidentally noted to have mild circumferential soft tissue swelling of the upper esophagus of uncertain etiology.  This does not correlate with her presenting complaints but the finding was discussed with her nonetheless.    After reviewing these findings with the patient it is still unclear why she is having more intense pain now.  It is possible that she is having new components of radiculopathy.  She already takes a muscle relaxer chronically and this has not provided any significant relief over the last several days.  I was able to review her previous prescriptions through the prescription monitoring program with assistance from our pharmacist.  It appears " that Suboxone has been her only regularly prescribed pain medication since May.  It is unclear if this will provide significant relief in the setting of Suboxone use but I did agree to provide a short course of pain medications until she is able to follow-up with her spine specialist in the next few days.  We will also prescribe some steroids to help with any component of nerve inflammation.  Patient agrees to follow-up with her spine specialist and primary care provider soon as possible for reevaluation and consideration of further imaging as indicated.  She also agrees to return to the emergency department anytime for any new or acutely worsened symptoms.      Discharge Medication List as of 9/7/2023  2:05 AM        START taking these medications    Details   oxyCODONE-acetaminophen (PERCOCET) 5-325 MG tablet Take 1 tablet by mouth every 6 hours as needed for pain, Disp-12 tablet, R-0, Local Print      predniSONE (DELTASONE) 20 MG tablet Take two tablets (= 40mg) each day for 5 (five) days, Disp-10 tablet, R-0, E-Prescribe             Final diagnoses:   Neck pain   Chronic thoracic back pain, unspecified back pain laterality   Numbness       9/6/2023   [facility] EMERGENCY DEPT       Jayjay Pérez MD  09/07/23 [id number]

## 2023-09-07 NOTE — TELEPHONE ENCOUNTER
----- Message from Lary Antonio sent at 9/6/2023 12:52 PM CDT -----  Good Afternoon,    Patient is requesting labs need to be ordered.    Thank you

## 2023-09-11 ENCOUNTER — THERAPY VISIT (OUTPATIENT)
Dept: PHYSICAL THERAPY | Facility: CLINIC | Age: 48
End: 2023-09-11
Payer: COMMERCIAL

## 2023-09-11 DIAGNOSIS — M54.2 CHRONIC NECK PAIN: Primary | ICD-10-CM

## 2023-09-11 DIAGNOSIS — M54.50 CHRONIC MIDLINE LOW BACK PAIN WITHOUT SCIATICA: ICD-10-CM

## 2023-09-11 DIAGNOSIS — G89.29 CHRONIC NECK PAIN: Primary | ICD-10-CM

## 2023-09-11 DIAGNOSIS — G89.29 CHRONIC MIDLINE LOW BACK PAIN WITHOUT SCIATICA: ICD-10-CM

## 2023-09-11 PROCEDURE — 97140 MANUAL THERAPY 1/> REGIONS: CPT | Mod: GP | Performed by: PHYSICAL THERAPIST

## 2023-09-12 ENCOUNTER — HOSPITAL ENCOUNTER (EMERGENCY)
Facility: CLINIC | Age: 48
Discharge: HOME | End: 2023-09-13
Attending: STUDENT IN AN ORGANIZED HEALTH CARE EDUCATION/TRAINING PROGRAM | Admitting: STUDENT IN AN ORGANIZED HEALTH CARE EDUCATION/TRAINING PROGRAM
Payer: COMMERCIAL

## 2023-09-12 ENCOUNTER — VIRTUAL VISIT (OUTPATIENT)
Dept: PSYCHOLOGY | Facility: OTHER | Age: 48
End: 2023-09-12
Payer: COMMERCIAL

## 2023-09-12 DIAGNOSIS — R53.83 OTHER FATIGUE: ICD-10-CM

## 2023-09-12 DIAGNOSIS — F41.1 GENERALIZED ANXIETY DISORDER: ICD-10-CM

## 2023-09-12 DIAGNOSIS — M54.2 NECK PAIN: ICD-10-CM

## 2023-09-12 DIAGNOSIS — F33.1 MODERATE EPISODE OF RECURRENT MAJOR DEPRESSIVE DISORDER (H): Primary | ICD-10-CM

## 2023-09-12 DIAGNOSIS — M54.6 MIDLINE THORACIC BACK PAIN, UNSPECIFIED CHRONICITY: ICD-10-CM

## 2023-09-12 DIAGNOSIS — R52 BODY ACHES: ICD-10-CM

## 2023-09-12 DIAGNOSIS — R79.82 ELEVATED C-REACTIVE PROTEIN (CRP): ICD-10-CM

## 2023-09-12 PROCEDURE — 99284 EMERGENCY DEPT VISIT MOD MDM: CPT | Mod: 25 | Performed by: STUDENT IN AN ORGANIZED HEALTH CARE EDUCATION/TRAINING PROGRAM

## 2023-09-12 PROCEDURE — 93005 ELECTROCARDIOGRAM TRACING: CPT | Performed by: STUDENT IN AN ORGANIZED HEALTH CARE EDUCATION/TRAINING PROGRAM

## 2023-09-12 PROCEDURE — 93010 ELECTROCARDIOGRAM REPORT: CPT | Performed by: STUDENT IN AN ORGANIZED HEALTH CARE EDUCATION/TRAINING PROGRAM

## 2023-09-12 PROCEDURE — 90834 PSYTX W PT 45 MINUTES: CPT | Mod: VID | Performed by: COUNSELOR

## 2023-09-12 RX ORDER — OXYCODONE AND ACETAMINOPHEN 5; 325 MG/1; MG/1
1 TABLET ORAL ONCE
Status: COMPLETED | OUTPATIENT
Start: 2023-09-13 | End: 2023-09-13

## 2023-09-12 RX ORDER — ONDANSETRON 2 MG/ML
4 INJECTION INTRAMUSCULAR; INTRAVENOUS EVERY 30 MIN PRN
Status: DISCONTINUED | OUTPATIENT
Start: 2023-09-12 | End: 2023-09-13 | Stop reason: HOSPADM

## 2023-09-12 NOTE — Clinical Note
Radha Beckwith was seen and treated in our emergency department on 9/12/2023.  She may return to work on 09/15/2023.       If you have any questions or concerns, please don't hesitate to call.      Jayjay Pérez MD

## 2023-09-12 NOTE — PROGRESS NOTES
Answers submitted by the patient for this visit:  Patient Health Questionnaire (Submitted on 8/9/2023)  If you checked off any problems, how difficult have these problems made it for you to do your work, take care of things at home, or get along with other people?: Extremely difficult  PHQ9 TOTAL SCORE: 22            [facility]                                     Progress Note    Patient Name: Radha Beckwith  Date: 9/12/23         Service Type: Individual      Session Start Time: 11:05am Session End Time:11:55am     Session Length: 50    Session #: 8    Attendees: Client    Service Modality:  Video Visit:      Provider verified identity through the following two step process.  Patient provided:  Patient is known previously to provider    Telemedicine Visit: The patient's condition can be safely assessed and treated via synchronous audio and visual telemedicine encounter.      Reason for Telemedicine Visit: Patient convenience (e.g. access to timely appointments / distance to available provider)    Originating Site (Patient Location): Patient's home    Distant Site (Provider Location): Provider Remote Setting- Home Office    Consent:  The patient/guardian has verbally consented to: the potential risks and benefits of telemedicine (video visit) versus in person care; bill my insurance or make self-payment for services provided; and responsibility for payment of non-covered services.     Patient would like the video invitation sent by:  My Chart    Mode of Communication:  Video Conference via [employer]well    Distant Location (Provider):  Off-site    As the provider I attest to compliance with applicable laws and regulations related to telemedicine.    DATA  Interactive Complexity: No  Crisis: No        Progress Since Last Session (Related to Symptoms / Goals / Homework):   Symptoms: No change .    Homework: Completed in session      Episode of Care Goals: Minimal progress - ACTION (Actively working  towards change); Intervened by reinforcing change plan / affirming steps taken     Current / Ongoing Stressors and Concerns:   Client stated she's been having headaches, head tingling, and other unusual symptoms that have been inhibiting her sleep. Last week she went to the ER.   Her son bought an acre and a half with some friends. 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      Treatment Objective(s) Addressed in This Session:   Increase interest, engagement, and pleasure in doing things  Feel less tired and more energy during the day        Intervention:   Discussed the physical symptoms she's been having. Discussed relationship issues she's been experiencing and how she's been working to navigate this.     Assessments completed prior to visit:  The following assessments were completed by patient for this visit:  PHQ9:       6/12/2023    10:17 AM 6/19/2023    12:20 PM 6/28/2023    11:26 AM 8/1/2023    12:12 PM 8/9/2023    12:54 PM 8/10/2023    12:46 PM 9/4/2023    10:00 PM   PHQ-9 SCORE   PHQ-9 Total Score MyChart 20 (Severe depression) 19 (Moderately severe depression) 19 (Moderately severe depression) 22 (Severe depression) 22 (Severe depression) 23 (Severe depression) 18 (Moderately severe depression)   PHQ-9 Total Score 20 19 19 22 22 23 18     GAD7:       3/25/2023     3:01 PM 4/17/2023    12:45 PM 4/26/2023     1:00 PM 5/10/2023    12:51 PM 6/12/2023    10:19 AM 8/1/2023    12:15 PM 9/4/2023    10:01 PM   BO-7 SCORE   Total Score 16 (severe anxiety) 15 (severe anxiety)  17 (severe anxiety) 19 (severe anxiety) 19 (severe anxiety) 19 (severe anxiety)   Total Score 16 15    15 18 17 19    19 19 19     PROMIS 10-Global Health (all questions and answers displayed):       10/26/2022    12:50 PM 1/25/2023    12:48 PM 2/7/2023    12:57 PM 4/17/2023     2:12 PM 6/25/2023     2:45 PM 8/10/2023    12:50 PM   PROMIS 10   In general, would you say your health is:    Poor Poor  Fair   In general, would you say your quality of life is:    Poor Fair Poor   In general, how would you rate your physical health?    Poor Poor Poor   In general, how would you rate your mental health, including your mood and your ability to think?    Fair Fair Poor   In general, how would you rate your satisfaction with your social activities and relationships?    Fair Poor Fair   In general, please rate how well you carry out your usual social activities and roles    Fair Poor Poor   To what extent are you able to carry out your everyday physical activities such as walking, climbing stairs, carrying groceries, or moving a chair?    A little Moderately A little   In the past 7 days, how often have you been bothered by emotional problems such as feeling anxious, depressed, or irritable?    Always Always Always   In the past 7 days, how would you rate your fatigue on average?    Moderate Severe Severe   In the past 7 days, how would you rate your pain on average, where 0 means no pain, and 10 means worst imaginable pain?    8 7 7   In general, would you say your health is:    1    1 1 2   In general, would you say your quality of life is:    1    1 2 1   In general, how would you rate your physical health?    1    1 1 1   In general, how would you rate your mental health, including your mood and your ability to think?    2    2 2 1   In general, how would you rate your satisfaction with your social activities and relationships?    2    2 1 2   In general, please rate how well you carry out your usual social activities and roles. (This includes activities at home, at work and in your community, and responsibilities as a parent, child, spouse, employee, friend, etc.)    2    2 1 1   To what extent are you able to carry out your everyday physical activities such as walking, climbing stairs, carrying groceries, or moving a chair?    2    2 3 2   In the past 7 days, how often have you been bothered by emotional problems  such as feeling anxious, depressed, or irritable?    5    5 5 5   In the past 7 days, how would you rate your fatigue on average?    3    3 4 4   In the past 7 days, how would you rate your pain on average, where 0 means no pain, and 10 means worst imaginable pain?    8    8 7 7   Global Mental Health Score    6    6 6 5   Global Physical Health Score    8    8 8 7   PROMIS TOTAL - SUBSCORES    14    14 14 12       Information is confidential and restricted. Go to Review Flowsheets to unlock data.    Multiple values from one day are sorted in reverse-chronological order     [place] Suicide Severity Rating Scale (Lifetime/Recent)      10/4/2022    11:00 AM   [place] Suicide Severity Rating (Lifetime/Recent)   Q1 Wished to be Dead (Past Month) no   Q2 Suicidal Thoughts (Past Month) no   Q3 Suicidal Thought Method no   Q4 Suicidal Intent without Specific Plan no   Q5 Suicide Intent with Specific Plan yes   Q6 Suicide Behavior (Lifetime) yes   Within the Past 3 Months? no   Level of Risk per Screen high risk         ASSESSMENT: Current Emotional / Mental Status (status of significant symptoms):   Risk status (Self / Other harm or suicidal ideation)   Patient denies current fears or concerns for personal safety.   Patient denies current or recent suicidal ideation or behaviors.   Patient denies current or recent homicidal ideation or behaviors.   Patient denies current or recent self injurious behavior or ideation.   Patient denies other safety concerns.   Patient reports there has been no change in risk factors since their last session.     Patient reports there has been no change in protective factors since their last session.     Recommended that patient call 911 or go to the local ED should there be a change in any of these risk factors.     Appearance:   Appropriate    Eye Contact:   Good    Psychomotor Behavior: Normal    Attitude:   Cooperative    Orientation:   All   Speech    Rate / Production: Normal/  Responsive Normal     Volume:  Normal    Mood:    Normal   Affect:    Appropriate    Thought Content:  Clear    Thought Form:  Coherent  Logical    Insight:    Good      Medication Review:   No changes to current psychiatric medication(s)     Medication Compliance:   Yes     Changes in Health Issues:   None reported     Chemical Use Review:   Substance Use: Chemical use reviewed, no active concerns identified      Tobacco Use: No change in amount of tobacco use since last session.  Patient declined discussion at this time    Diagnosis:  1. Moderate episode of recurrent major depressive disorder (H)    2. Generalized anxiety disorder        Collateral Reports Completed:   Not Applicable    PLAN: (Patient Tasks / Therapist Tasks / Other)  Continue to work on stress reduction skills.         Ricarda Bhatia, [facility]                                                         ______________________________________________________________________    Individual Treatment Plan    Patient's Name: Radha Beckwith  YOB: 1975    Date of Creation: 6/28/23  Date Treatment Plan Last Reviewed/Revised: 9/12/23    DSM5 Diagnoses: 296.32 (F33.1) Major Depressive Disorder, Recurrent Episode, Moderate _  Psychosocial / Contextual Factors: living with boyfriend, memory issues  PROMIS (reviewed every 90 days):     Referral / Collaboration:  Referral to another professional/service is not indicated at this time..    Anticipated number of session for this episode of care: 9-12 sessions  Anticipation frequency of session:  as needed  Anticipated Duration of each session: 38-52 minutes  Treatment plan will be reviewed in 90 days or when goals have been changed.       MeasurableTreatment Goal(s) related to diagnosis / functional impairment(s)  Goal 1: Patient will increase communication skills with family members.    Objective #A (Patient Action)    Patient will learn & utilize at least 2 assertive communication skills  weekly.  Status: Continued - Date(s):9/12/23     Intervention(s)  Therapist will teach emotional regulation skills. distress tolerance skills, interpersonal effectiveness skills, emotion regluation skills, mindfulness skills, radical acceptance. Therapist will teach client how to ID body cues for anxiety, anxiety reduction techniques, how to ID triggers for depression and anxiety- decrease reactivity/eliminate, lifestyle changes to reduce depression and anxiety, communication skills, explore cognitive beliefs and help client to develop healthy cognitive patterns and beliefs.    Objective #B  Patient will use thought-stopping strategy daily to reduce intrusive thoughts.  Status: Continued - Date(s):9/12/23     Intervention(s)  Therapist will teach emotional regulation skills. distress tolerance skills, interpersonal effectiveness skills, emotion regluation skills, mindfulness skills, radical acceptance. Therapist will teach client how to ID body cues for anxiety, anxiety reduction techniques, how to ID triggers for depression and anxiety- decrease reactivity/eliminate, lifestyle changes to reduce depression and anxiety, communication skills, explore cognitive beliefs and help client to develop healthy cognitive patterns and beliefs.      Goal 2: Patient will decrease depression symptoms.      Objective #A (Patient Action)    Status: Continued - Date(s):9/12/23     Patient will Increase interest, engagement, and pleasure in doing things  Decrease frequency and intensity of feeling down, depressed, hopeless  Improve quantity and quality of night time sleep / decrease daytime naps  Feel less tired and more energy during the day   Improve diet, appetite, mindful eating, and / or meal planning  Identify negative self-talk and behaviors: challenge core beliefs, myths, and actions  Improve concentration, focus, and mindfulness in daily activities   Feel less fidgety, restless or slow in daily activities / interpersonal  interactions.    Intervention(s)  Therapist will teach emotional regulation skills. distress tolerance skills, interpersonal effectiveness skills, emotion regluation skills, mindfulness skills, radical acceptance. Therapist will teach client how to ID body cues for anxiety, anxiety reduction techniques, how to ID triggers for depression and anxiety- decrease reactivity/eliminate, lifestyle changes to reduce depression and anxiety, communication skills, explore cognitive beliefs and help client to develop healthy cognitive patterns and beliefs.    Objective #B  Patient will identify three distraction and diversion activities and use those activities to decrease level of anxiety  .    Status: Continued - Date(s):  9/12/23     Intervention(s)  Therapist will teach emotional regulation skills. distress tolerance skills, interpersonal effectiveness skills, emotion regluation skills, mindfulness skills, radical acceptance. Therapist will teach client how to ID body cues for anxiety, anxiety reduction techniques, how to ID triggers for depression and anxiety- decrease reactivity/eliminate, lifestyle changes to reduce depression and anxiety, communication skills, explore cognitive beliefs and help client to develop healthy cognitive patterns and beliefs.      Patient has reviewed and agreed to the above plan.      Ricarda Bhatia [facility]

## 2023-09-13 ENCOUNTER — TELEPHONE (OUTPATIENT)
Dept: FAMILY MEDICINE | Facility: CLINIC | Age: 48
End: 2023-09-13

## 2023-09-13 VITALS
DIASTOLIC BLOOD PRESSURE: 61 MMHG | RESPIRATION RATE: 20 BRPM | OXYGEN SATURATION: 98 % | SYSTOLIC BLOOD PRESSURE: 113 MMHG | HEART RATE: 106 BPM

## 2023-09-13 LAB
ALBUMIN SERPL BCG-MCNC: 4.3 G/DL (ref 3.5–5.2)
ALBUMIN UR-MCNC: NEGATIVE MG/DL
ALP SERPL-CCNC: 87 U/L (ref 35–104)
ALT SERPL W P-5'-P-CCNC: 25 U/L (ref 0–50)
AMPHETAMINES UR QL SCN: NORMAL
ANION GAP SERPL CALCULATED.3IONS-SCNC: 13 MMOL/L (ref 7–15)
APPEARANCE UR: CLEAR
AST SERPL W P-5'-P-CCNC: 24 U/L (ref 0–45)
BACTERIA #/AREA URNS HPF: ABNORMAL /HPF
BARBITURATES UR QL SCN: NORMAL
BASOPHILS # BLD AUTO: 0.1 10E3/UL (ref 0–0.2)
BASOPHILS NFR BLD AUTO: 1 %
BENZODIAZ UR QL SCN: NORMAL
BILIRUB SERPL-MCNC: <0.2 MG/DL
BILIRUB UR QL STRIP: NEGATIVE
BUN SERPL-MCNC: 8.2 MG/DL (ref 6–20)
BZE UR QL SCN: NORMAL
CALCIUM SERPL-MCNC: 9.3 MG/DL (ref 8.6–10)
CANNABINOIDS UR QL SCN: NORMAL
CHLORIDE SERPL-SCNC: 101 MMOL/L (ref 98–107)
COLOR UR AUTO: YELLOW
CREAT SERPL-MCNC: 0.68 MG/DL (ref 0.51–0.95)
CRP SERPL-MCNC: 6.76 MG/L
DEPRECATED HCO3 PLAS-SCNC: 24 MMOL/L (ref 22–29)
EGFRCR SERPLBLD CKD-EPI 2021: >90 ML/MIN/1.73M2
EOSINOPHIL # BLD AUTO: 0.1 10E3/UL (ref 0–0.7)
EOSINOPHIL NFR BLD AUTO: 2 %
ERYTHROCYTE [DISTWIDTH] IN BLOOD BY AUTOMATED COUNT: 13.4 % (ref 10–15)
ERYTHROCYTE [SEDIMENTATION RATE] IN BLOOD BY WESTERGREN METHOD: 17 MM/HR (ref 0–20)
FENTANYL UR QL: NORMAL
GLUCOSE SERPL-MCNC: 154 MG/DL (ref 70–99)
GLUCOSE UR STRIP-MCNC: NEGATIVE MG/DL
HCT VFR BLD AUTO: 38.4 % (ref 35–47)
HGB BLD-MCNC: 12.8 G/DL (ref 11.7–15.7)
HGB UR QL STRIP: NEGATIVE
IMM GRANULOCYTES # BLD: 0 10E3/UL
IMM GRANULOCYTES NFR BLD: 0 %
KETONES UR STRIP-MCNC: NEGATIVE MG/DL
LEUKOCYTE ESTERASE UR QL STRIP: NEGATIVE
LIPASE SERPL-CCNC: 20 U/L (ref 13–60)
LYMPHOCYTES # BLD AUTO: 2.9 10E3/UL (ref 0.8–5.3)
LYMPHOCYTES NFR BLD AUTO: 36 %
MCH RBC QN AUTO: 29.7 PG (ref 26.5–33)
MCHC RBC AUTO-ENTMCNC: 33.3 G/DL (ref 31.5–36.5)
MCV RBC AUTO: 89 FL (ref 78–100)
MONOCYTES # BLD AUTO: 0.4 10E3/UL (ref 0–1.3)
MONOCYTES NFR BLD AUTO: 6 %
MUCOUS THREADS #/AREA URNS LPF: PRESENT /LPF
NEUTROPHILS # BLD AUTO: 4.5 10E3/UL (ref 1.6–8.3)
NEUTROPHILS NFR BLD AUTO: 55 %
NITRATE UR QL: NEGATIVE
NRBC # BLD AUTO: 0 10E3/UL
NRBC BLD AUTO-RTO: 0 /100
OPIATES UR QL SCN: NORMAL
PCP QUAL URINE (ROCHE): NORMAL
PH UR STRIP: 6 [PH] (ref 5–7)
PLATELET # BLD AUTO: 231 10E3/UL (ref 150–450)
POTASSIUM SERPL-SCNC: 3.5 MMOL/L (ref 3.4–5.3)
PROCALCITONIN SERPL IA-MCNC: <0.02 NG/ML
PROT SERPL-MCNC: 7.2 G/DL (ref 6.4–8.3)
RBC # BLD AUTO: 4.31 10E6/UL (ref 3.8–5.2)
RBC URINE: <1 /HPF
SODIUM SERPL-SCNC: 138 MMOL/L (ref 136–145)
SP GR UR STRIP: 1.01 (ref 1–1.03)
SQUAMOUS EPITHELIAL: 4 /HPF
TROPONIN T SERPL HS-MCNC: <6 NG/L
UROBILINOGEN UR STRIP-MCNC: NORMAL MG/DL
WBC # BLD AUTO: 8 10E3/UL (ref 4–11)
WBC URINE: 2 /HPF

## 2023-09-13 PROCEDURE — 83690 ASSAY OF LIPASE: CPT | Performed by: STUDENT IN AN ORGANIZED HEALTH CARE EDUCATION/TRAINING PROGRAM

## 2023-09-13 PROCEDURE — 96361 HYDRATE IV INFUSION ADD-ON: CPT | Performed by: STUDENT IN AN ORGANIZED HEALTH CARE EDUCATION/TRAINING PROGRAM

## 2023-09-13 PROCEDURE — 258N000003 HC RX IP 258 OP 636: Performed by: STUDENT IN AN ORGANIZED HEALTH CARE EDUCATION/TRAINING PROGRAM

## 2023-09-13 PROCEDURE — 96374 THER/PROPH/DIAG INJ IV PUSH: CPT | Performed by: STUDENT IN AN ORGANIZED HEALTH CARE EDUCATION/TRAINING PROGRAM

## 2023-09-13 PROCEDURE — 36415 COLL VENOUS BLD VENIPUNCTURE: CPT | Performed by: STUDENT IN AN ORGANIZED HEALTH CARE EDUCATION/TRAINING PROGRAM

## 2023-09-13 PROCEDURE — 80307 DRUG TEST PRSMV CHEM ANLYZR: CPT | Performed by: STUDENT IN AN ORGANIZED HEALTH CARE EDUCATION/TRAINING PROGRAM

## 2023-09-13 PROCEDURE — 86140 C-REACTIVE PROTEIN: CPT | Performed by: STUDENT IN AN ORGANIZED HEALTH CARE EDUCATION/TRAINING PROGRAM

## 2023-09-13 PROCEDURE — 250N000011 HC RX IP 250 OP 636: Mod: JZ | Performed by: STUDENT IN AN ORGANIZED HEALTH CARE EDUCATION/TRAINING PROGRAM

## 2023-09-13 PROCEDURE — 84145 PROCALCITONIN (PCT): CPT | Performed by: STUDENT IN AN ORGANIZED HEALTH CARE EDUCATION/TRAINING PROGRAM

## 2023-09-13 PROCEDURE — 84484 ASSAY OF TROPONIN QUANT: CPT | Performed by: STUDENT IN AN ORGANIZED HEALTH CARE EDUCATION/TRAINING PROGRAM

## 2023-09-13 PROCEDURE — 85652 RBC SED RATE AUTOMATED: CPT | Performed by: STUDENT IN AN ORGANIZED HEALTH CARE EDUCATION/TRAINING PROGRAM

## 2023-09-13 PROCEDURE — 80053 COMPREHEN METABOLIC PANEL: CPT | Performed by: STUDENT IN AN ORGANIZED HEALTH CARE EDUCATION/TRAINING PROGRAM

## 2023-09-13 PROCEDURE — 85025 COMPLETE CBC W/AUTO DIFF WBC: CPT | Performed by: STUDENT IN AN ORGANIZED HEALTH CARE EDUCATION/TRAINING PROGRAM

## 2023-09-13 PROCEDURE — 250N000013 HC RX MED GY IP 250 OP 250 PS 637: Performed by: STUDENT IN AN ORGANIZED HEALTH CARE EDUCATION/TRAINING PROGRAM

## 2023-09-13 PROCEDURE — 81001 URINALYSIS AUTO W/SCOPE: CPT | Performed by: STUDENT IN AN ORGANIZED HEALTH CARE EDUCATION/TRAINING PROGRAM

## 2023-09-13 RX ADMIN — OXYCODONE HYDROCHLORIDE AND ACETAMINOPHEN 1 TABLET: 5; 325 TABLET ORAL at 00:21

## 2023-09-13 RX ADMIN — SODIUM CHLORIDE 1000 ML: 9 INJECTION, SOLUTION INTRAVENOUS at 00:21

## 2023-09-13 RX ADMIN — ONDANSETRON 4 MG: 2 INJECTION INTRAMUSCULAR; INTRAVENOUS at 00:21

## 2023-09-13 ASSESSMENT — ACTIVITIES OF DAILY LIVING (ADL): ADLS_ACUITY_SCORE: 35

## 2023-09-13 NOTE — ED PROVIDER NOTES
"  History     Chief Complaint   Patient presents with    Back Pain     HPI  Radha Beckwith is a 47 year old female with history of chronic neck and back pain, depression, opioid dependence on Suboxone presents for evaluation of worsened neck/back discomfort.  Patient describes several weeks of progressively worsening diffuse back discomfort which started without obvious injury or strain.  She was evaluated for these complaints in the emergency department on 9/6.  Symptoms at that time were associated with headache and some areas of numbness to her extremities.  Work-up was very reassuring overall.  She had minimal elevation in inflammatory markers, but no leukocytosis or significant electrolyte abnormalities.  Viral testing was negative.  CT/A imaging of the head, cervical, and thoracic spine showed mostly degenerative changes without acute findings.  There was an area of disc protrusion at C4-C5.  She was also found to have some circumferential soft tissue thickening of the upper esophagus found incidentally.  No acute fractures or other abnormalities were discovered.  I discharged the patient with a short prescription for Percocet after review of  database and also a course of steroids to help with any component of radicular pain.  Despite taking these medications as instructed she has not experienced significant relief of her discomfort.  In the interim she was evaluated by her spine/pain specialist.  Some medication changes were made including initiation of gabapentin, continuation of baclofen and Flexeril.  Patient now returns to the emergency department for worsened chronic pain today.  The discomfort is localized mostly to her mid thoracic spine over the midline.  She denies obvious provoking factors but experiences intermittent \"burning\" radiation all the way through her cervical and lumbar spine.  The headache and numbness that she had been experiencing last week are improved but she is still " experiencing a variety of symptoms including some sporadic abdominal discomfort, fatigue, and nausea.  Patient otherwise denies fever, chest pain, changes in bowel or urinary habits, vision changes, focal numbness/tingling/weakness, other complaints today.    Allergies:  Allergies   Allergen Reactions    Ergotamine-Caffeine      12-            GI problems-    Seasonal Allergies     Sumatriptan      vomits after giving herself a shot    Compazine Anxiety    Droperidol Anxiety    Nubain [Nalbuphine Hcl] Anxiety    Prochlorperazine Palpitations     Uncontrolled movement       Problem List:    Patient Active Problem List    Diagnosis Date Noted    Chronic midline low back pain without sciatica 06/15/2023     Priority: Medium    Chronic neck pain 06/15/2023     Priority: Medium    Moderate episode of recurrent major depressive disorder (H) 06/29/2022     Priority: Medium    Moderate major depression (H) 06/03/2019     Priority: Medium    Supraventricular tachycardia (H) 06/03/2019     Priority: Medium    Psychophysiological insomnia 12/30/2017     Priority: Medium    Chronic bilateral low back pain without sciatica 12/30/2017     Priority: Medium    Opioid dependence in remission (H) 08/02/2015     Priority: Medium     On suboxone treatement with Bryant Harkins.  (Noted in 2015).        Anxiety attack 07/31/2015     Priority: Medium    Hypokalemia 06/23/2015     Priority: Medium    Tobacco abuse 06/23/2015     Priority: Medium    Hyperlipidemia LDL goal <100 01/29/2014     Priority: Medium    Anxiety 08/19/2013     Priority: Medium    GERD (gastroesophageal reflux disease) 07/28/2010     Priority: Medium     Takes omeprazole and metoclopramide      Restless legs 07/28/2010     Priority: Medium        Past Medical History:    Past Medical History:   Diagnosis Date    Abdominal pain, right lower quadrant 03/09/2008    Anxiety attack 07/31/2015    Atypical chest pain 06/23/2015    De Quervain's  disease (tenosynovitis)     Dehydration     Gastric ulcer 07/31/2015    GERD (gastroesophageal reflux disease) 07/28/2010    Ingrowing nail 01/09/2014    Migraines     Opioid dependence in remission (H) 08/02/2015    Other and unspecified ovarian cyst     Papanicolaou smear of cervix with low grade squamous intraepithelial lesion (LGSIL) 07/07/2017       Past Surgical History:    Past Surgical History:   Procedure Laterality Date    BIOPSY CERVICAL, LOCAL EXCISION, SINGLE/MULTIPLE N/A 8/10/2017    Procedure: BIOPSY CERVICAL, LOCAL EXCISION, SINGLE/MULTIPLE;;  Surgeon: Michael Chandler MD;  Location: [facility]    COLONOSCOPY N/A 1/6/2023    Procedure: COLONOSCOPY;  Surgeon: Michael Dozier MD;  Location: [facility]    COLPOSCOPY, BIOPSY, COMBINED N/A 8/10/2017    Procedure: COMBINED COLPOSCOPY, BIOPSY;  Colposcopy with Cervical Biopsies and Endometrial Biopsy, Exam with Ultrasound;  Surgeon: Michael Chandler MD;  Location: [facility]    ESOPHAGOSCOPY, GASTROSCOPY, DUODENOSCOPY (EGD), COMBINED N/A 4/17/2017    Procedure: COMBINED ESOPHAGOSCOPY, GASTROSCOPY, DUODENOSCOPY (EGD);  Surgeon: Ibrahima Esposito MD;  Location: [facility]    ESOPHAGOSCOPY, GASTROSCOPY, DUODENOSCOPY (EGD), COMBINED N/A 1/6/2023    Procedure: ESOPHAGOGASTRODUODENOSCOPY, WITH BIOPSY;  Surgeon: Michael Dozier MD;  Location: [facility]    EXAM UNDER ANESTHESIA PELVIC N/A 8/10/2017    Procedure: EXAM UNDER ANESTHESIA PELVIC;;  Surgeon: Michael Chandler MD;  Location: [facility]    HYSTERECTOMY      HYSTERECTOMY, PAP NO LONGER INDICATED      INJECT EPIDURAL LUMBAR Bilateral 7/1/2022    Procedure: Lumbar 5-Sacral 1 Transforaminal Epidural Steroid Injection with fluoroscopic guidance and contrast, bilateral;  Surgeon: Trevor Ivory MD;  Location: [facility]    LAPAROSCOPIC CHOLECYSTECTOMY N/A 2/2/2018    Procedure: LAPAROSCOPIC CHOLECYSTECTOMY;  Laparoscopic Cholecystectomy;  Surgeon: Tigre Lowry DO;  Location: [facility]    LAPAROSCOPIC  HYSTERECTOMY TOTAL N/A 10/30/2017    Procedure: LAPAROSCOPIC HYSTERECTOMY TOTAL;  LAPAROSCOPIC HYSTERECTOMY TOTAL POSSIBLE SALPINGO-OOPHERECTOMY (BILATERAL);  Surgeon: Michael Chandler MD;  Location: [facility] UGI ENDOSCOPY, SIMPLE EXAM  01/07/08       Family History:    Family History   Problem Relation Age of Onset    Depression Mother     Respiratory Mother     Chronic Obstructive Pulmonary Disease Mother     Cerebrovascular Disease Father         Brain anyeurism    Breast Cancer Cousin     Adrenal Disorder Other     Chronic Obstructive Pulmonary Disease Other        Social History:  Marital Status:  Single [1]  Social History     Tobacco Use    Smoking status: Some Days     Packs/day: 0.25     Years: 20.00     Pack years: 5.00     Types: Cigarettes    Smokeless tobacco: Never   Vaping Use    Vaping Use: Some days    Substances: Nicotine, CBD    Devices: [employer]   Substance Use Topics    Alcohol use: Yes     Comment: occasional drinks; about 5-6 beers/week    Drug use: No        Medications:    acetaminophen (TYLENOL) 500 MG tablet  ALPRAZolam (XANAX) 0.5 MG tablet  baclofen (LIORESAL) 20 MG tablet  bisacodyl (DULCOLAX) 5 MG EC tablet  buprenorphine HCl-naloxone HCl (SUBOXONE) 2-0.5 MG per film  cetirizine (ZYRTEC) 10 MG tablet  chlorhexidine (PERIDEX) 0.12 % solution  cyclobenzaprine (FLEXERIL) 5 MG tablet  DULoxetine (CYMBALTA) 30 MG capsule  famotidine (PEPCID) 40 MG tablet  gabapentin (NEURONTIN) 300 MG capsule  lactulose (CHRONULAC) 10 GM/15ML solution  ondansetron (ZOFRAN ODT) 4 MG ODT tab  pantoprazole (PROTONIX) 40 MG EC tablet  polyethylene glycol (MIRALAX) 17 GM/Dose powder  predniSONE (DELTASONE) 20 MG tablet  rOPINIRole (REQUIP) 1 MG tablet  simvastatin (ZOCOR) 10 MG tablet  traZODone (DESYREL) 50 MG tablet  zolpidem (AMBIEN) 5 MG tablet      Review of Systems   All other systems reviewed and are negative.  See HPI.    Physical Exam   BP: (!) 158/77  Pulse: 110  Resp: 20  SpO2:  98 %      Physical Exam  Vitals and nursing note reviewed.   Constitutional:       General: She is not in acute distress.     Appearance: Normal appearance. She is not diaphoretic.      Comments: Flat, almost defeated affect.  Somewhat anxious and uncomfortable.  Overall nontoxic.   HENT:      Head: Normocephalic and atraumatic.      Nose: Nose normal. No congestion or rhinorrhea.      Mouth/Throat:      Mouth: Mucous membranes are moist.      Pharynx: Oropharynx is clear. No oropharyngeal exudate or posterior oropharyngeal erythema.   Eyes:      General: No scleral icterus.     Extraocular Movements: Extraocular movements intact.      Conjunctiva/sclera: Conjunctivae normal.      Pupils: Pupils are equal, round, and reactive to light.   Cardiovascular:      Rate and Rhythm: Normal rate and regular rhythm.      Pulses: Normal pulses.      Heart sounds: Normal heart sounds. No murmur heard.  Pulmonary:      Effort: Pulmonary effort is normal. No respiratory distress.      Breath sounds: Normal breath sounds. No wheezing or rhonchi.   Abdominal:      General: Abdomen is flat. Bowel sounds are normal.      Tenderness: There is no abdominal tenderness. There is no guarding or rebound.   Musculoskeletal:         General: Tenderness present. No swelling. Normal range of motion.      Cervical back: Normal range of motion and neck supple. No rigidity or tenderness.      Comments: Patient has minimal if any mid thoracic tenderness over the midline.  I do not appreciate overlying skin changes or warmth.  No obvious bony abnormality/step-off.  She does not have significant midline tenderness elsewhere.  There is very mild diffuse paraspinal tenderness to regions of the cervical and thoracic spine as well.   Skin:     General: Skin is warm.      Capillary Refill: Capillary refill takes less than 2 seconds.      Findings: No rash.   Neurological:      General: No focal deficit present.      Mental Status: She is alert and  oriented to person, place, and time.      Cranial Nerves: No cranial nerve deficit.      Sensory: No sensory deficit.      Motor: No weakness.      Coordination: Coordination normal.      Comments: Moving all extremities spontaneously with equal strength throughout.  Normal coordination.  Gait is unremarkable.  She is changing position from recliner to bed due to reported discomfort.   Psychiatric:      Comments: Very flat affect.  Depressed, defeated mood.         ED Course           Procedures         EKG performed at 12:05 AM.  Sinus tachycardia, rate 103.  Normal axis.  Normal intervals.  Nonspecific T wave changes.  Exam independently interpreted by me.         Results for orders placed or performed during the hospital encounter of 09/12/23 (from the past 24 hour(s))   CBC with platelets differential    Narrative    The following orders were created for panel order CBC with platelets differential.  Procedure                               Abnormality         Status                     ---------                               -----------         ------                     CBC with platelets and d...[[id number]]                      Final result                 Please view results for these tests on the individual orders.   Comprehensive metabolic panel   Result Value Ref Range    Sodium 138 136 - 145 mmol/L    Potassium 3.5 3.4 - 5.3 mmol/L    Chloride 101 98 - 107 mmol/L    Carbon Dioxide (CO2) 24 22 - 29 mmol/L    Anion Gap 13 7 - 15 mmol/L    Urea Nitrogen 8.2 6.0 - 20.0 mg/dL    Creatinine 0.68 0.51 - 0.95 mg/dL    Calcium 9.3 8.6 - 10.0 mg/dL    Glucose 154 (H) 70 - 99 mg/dL    Alkaline Phosphatase 87 35 - 104 U/L    AST 24 0 - 45 U/L    ALT 25 0 - 50 U/L    Protein Total 7.2 6.4 - 8.3 g/dL    Albumin 4.3 3.5 - 5.2 g/dL    Bilirubin Total <0.2 <=1.2 mg/dL    GFR Estimate >90 >60 mL/min/1.73m2   Lipase   Result Value Ref Range    Lipase 20 13 - 60 U/L   Troponin T, High Sensitivity   Result Value Ref Range     Troponin T, High Sensitivity <6 <=14 ng/L   CRP inflammation   Result Value Ref Range    CRP Inflammation 6.76 (H) <5.00 mg/L   Erythrocyte sedimentation rate auto   Result Value Ref Range    Erythrocyte Sedimentation Rate 17 0 - 20 mm/hr   Procalcitonin   Result Value Ref Range    Procalcitonin <0.02 <0.05 ng/mL   CBC with platelets and differential   Result Value Ref Range    WBC Count 8.0 4.0 - 11.0 10e3/uL    RBC Count 4.31 3.80 - 5.20 10e6/uL    Hemoglobin 12.8 11.7 - 15.7 g/dL    Hematocrit 38.4 35.0 - 47.0 %    MCV 89 78 - 100 fL    MCH 29.7 26.5 - 33.0 pg    MCHC 33.3 31.5 - 36.5 g/dL    RDW 13.4 10.0 - 15.0 %    Platelet Count 231 150 - 450 10e3/uL    % Neutrophils 55 %    % Lymphocytes 36 %    % Monocytes 6 %    % Eosinophils 2 %    % Basophils 1 %    % Immature Granulocytes 0 %    NRBCs per 100 WBC 0 <1 /100    Absolute Neutrophils 4.5 1.6 - 8.3 10e3/uL    Absolute Lymphocytes 2.9 0.8 - 5.3 10e3/uL    Absolute Monocytes 0.4 0.0 - 1.3 10e3/uL    Absolute Eosinophils 0.1 0.0 - 0.7 10e3/uL    Absolute Basophils 0.1 0.0 - 0.2 10e3/uL    Absolute Immature Granulocytes 0.0 <=0.4 10e3/uL    Absolute NRBCs 0.0 10e3/uL   Urine Drugs of Abuse Screen    Narrative    The following orders were created for panel order Urine Drugs of Abuse Screen.  Procedure                               Abnormality         Status                     ---------                               -----------         ------                     Drug Abuse Screen Qual U...[[id number]]  Normal              Final result                 Please view results for these tests on the individual orders.   UA with Microscopic reflex to Culture    Specimen: Urine, Midstream   Result Value Ref Range    Color Urine Yellow Colorless, Straw, Light Yellow, Yellow    Appearance Urine Clear Clear    Glucose Urine Negative Negative mg/dL    Bilirubin Urine Negative Negative    Ketones Urine Negative Negative mg/dL    Specific Gravity Urine 1.009 1.003 - 1.035     Blood Urine Negative Negative    pH Urine 6.0 5.0 - 7.0    Protein Albumin Urine Negative Negative mg/dL    Urobilinogen Urine Normal Normal, 2.0 mg/dL    Nitrite Urine Negative Negative    Leukocyte Esterase Urine Negative Negative    Bacteria Urine Few (A) None Seen /HPF    Mucus Urine Present (A) None Seen /LPF    RBC Urine <1 <=2 /HPF    WBC Urine 2 <=5 /HPF    Squamous Epithelials Urine 4 (H) <=1 /HPF    Narrative    Urine Culture not indicated   Drug Abuse Screen Qual Urine   Result Value Ref Range    Amphetamines Urine Screen Negative Screen Negative    Barbituates Urine Screen Negative Screen Negative    Benzodiazepine Urine Screen Negative Screen Negative    Cannabinoids Urine Screen Negative Screen Negative    Cocaine Urine Screen Negative Screen Negative    Fentanyl Qual Urine Screen Negative Screen Negative    Opiates Urine Screen Negative Screen Negative    PCP Urine Screen Negative Screen Negative       Medications   sodium chloride 0.9% BOLUS 1,000 mL (0 mLs Intravenous Stopped 9/13/23 0121)   oxyCODONE-acetaminophen (PERCOCET) 5-325 MG per tablet 1 tablet (1 tablet Oral $Given 9/13/23 0021)       Assessments & Plan (with Medical Decision Making)     I have reviewed the nursing notes.    I have reviewed the findings, diagnosis, plan and need for follow up with the patient.    Medical Decision Making  Radha Beckwith is a 47 year old female with history of chronic neck and back pain, depression, opioid dependence on Suboxone presents for evaluation of worsened neck/back discomfort.  Slightly tachycardic on arrival, vitals otherwise normal.  Exam is reassuring overall.  She is anxious and has somewhat of a depressed mood due to her longstanding discomfort.  Patient also has very mild tenderness to the midline of her mid thoracic spine and lesser paraspinal tenderness to areas of her cervical/thoracic spine as well.  However, there is no overlying associated skin changes and she does not have obvious  "bony abnormalities to these areas.  Neurological exam remains entirely nonfocal.  Lung sounds are clear and abdomen is nontender.  Etiology of symptoms is unclear.  Differential includes slipped disc/radiculopathy, musculoskeletal spasm, chronic pain/opiate dependence; very unlikely to represent spinal abscess or other acute spinal cord pathology without any associated red flag symptoms.  Lab work and EKG were obtained due to constellation of associated symptoms including sporadic abdominal discomfort and fatigue.    The patient had to be woken up in the triage area to be brought back to her exam room in the ED, but at the time of our initial encounter today she reported severe pain.  She states the only thing that has helped this in the last several weeks was when she received IV Dilaudid in the emergency department.  With reassuring work-up here last week and similar evaluation through her spine/pain specialist recently I did not feel as though IV narcotics were indicated initially.  Oral Percocet did not provide significant relief.  Her labs today were again very reassuring overall.  No leukocytosis or anemia.  Electrolytes and transaminases were unremarkable.  Procalcitonin was negative.  CRP is slightly elevated and increased from visit a week ago but ESR was normal.  EKG did not show any acute pathology.  Urinalysis and drug screen were negative.  I do not think repeat CT imaging is warranted today because they were just performed days ago.    All of these findings were discussed with the patient.  She describes continued burning discomfort localized to the thoracic spine with occasional \"burning\" radiation throughout her \"entire spine\".  Patient was very upset because she felt as though she was being treated poorly here in the emergency department.  I had a very lengthy conversation with her about her pain and how I did not feel comfortable providing IV Dilaudid without an identifiable cause of her symptoms.  " This is particularly true given that she follows with a pain specialist who just gave updated recommendations.  She did express understanding of this sentiment but also reiterated that she always feels judged due to her history of remote opiate abuse and current Suboxone use.  Patient separately described being dissatisfied with her nursing care and requested a new nurse.  This request was accommodated and she was also provided with paperwork to file a formal complaint if she chooses.    With continued pain I did offer to perform an MRI of the spine, though I think it is very unlikely that she has any abscess, compression, or other acute pathology.  MRI is unavailable overnight and this would have involved the patient staying for several hours until the morning.  She did not want to do this and stated that she will instead pursue potential MRI through her primary provider.  I will also send a message to help facilitate this.  Patient then requested a new prescription for pain medication, but I feel pain control is best handled by her existing care team.  Patient will follow-up with her PCP and agrees to return to the emergency department for any new or acutely worsened symptoms.    Discharge Medication List as of 9/13/2023  2:22 AM          Final diagnoses:   Midline thoracic back pain, unspecified chronicity   Neck pain   Other fatigue   Body aches   Elevated C-reactive protein (CRP)       9/12/2023   [facility] EMERGENCY DEPT       Jayjay Pérez MD  09/13/23 [id number]

## 2023-09-13 NOTE — TELEPHONE ENCOUNTER
----- Message from Jayjay Pérez MD sent at 9/13/2023  5:08 AM CDT -----  Regarding: Back pain  Hi Gisselle,    Just wanted to reach out because I have seen Ms. Beckwith a few times in the emergency department over the last week related to back/neck discomfort.  I did not see obvious acute abnormalities on labs/images during the first encounter, but she did not experience significant relief with steroids.  Looks like she was also able to follow-up with her spine/pain specialist and they tweaked her medications, also without significant improvement.  Her labs were again reassuring this evening and I did not think repeat CT imaging would be helpful today.  I mentioned the possibility of an MRI, as it looks like its been a while since this was last done, but she did not want to stay until the morning.  I told her that I would reach out to you in order to facilitate this on an outpatient basis if you feel it is warranted.    Please let me know if you have any questions or if I can help in any way.    Thanks,  Jayjay Pérez

## 2023-09-13 NOTE — ED TRIAGE NOTES
Triage Assessment       Row Name 09/12/23 4810       Triage Assessment (Adult)    Airway WDL WDL       Respiratory WDL    Respiratory WDL WDL       Cardiac WDL    Cardiac WDL WDL                  Back and neck pain has been worse lately.  Pt describes it as burning

## 2023-09-13 NOTE — TELEPHONE ENCOUNTER
Please call patient and ask her if she would like us to order the MRI or go through her back specialist.  Gisselle Linn, CNP

## 2023-09-13 NOTE — TELEPHONE ENCOUNTER
Please ask patient to do ED follow up on Friday= can be virtual at 8 am to discuss what MRI's we will be obtaining.  Gisselle Linn, CNP

## 2023-09-13 NOTE — DISCHARGE INSTRUCTIONS
The exact cause for your continued pain is still unclear.  Overall your CT results from days ago were reassuring and I do not think repeat CT imaging would provide further answers at this time.  Your lab tests today were again reassuring.  You have very mild elevation in one of your inflammatory markers but this is nonspecific.  We discussed that I think an MRI is needed to further explore your pain, particularly given its consistent location and how it radiates.  However, we do not have the ability to do this overnight in the hospital and after discussing options you did not want to stay to have it done in the morning.  I will reach out to your primary provider and try to facilitate this as soon as possible.  Continue taking previously prescribed pain medications to help with discomfort in the meantime.  Return to the emergency department for any new or acutely worsened symptoms.

## 2023-09-15 ENCOUNTER — VIRTUAL VISIT (OUTPATIENT)
Dept: FAMILY MEDICINE | Facility: CLINIC | Age: 48
End: 2023-09-15
Payer: COMMERCIAL

## 2023-09-15 DIAGNOSIS — M54.6 ACUTE BILATERAL THORACIC BACK PAIN: ICD-10-CM

## 2023-09-15 DIAGNOSIS — M54.50 LUMBAR PAIN: ICD-10-CM

## 2023-09-15 DIAGNOSIS — Z09 HOSPITAL DISCHARGE FOLLOW-UP: Primary | ICD-10-CM

## 2023-09-15 DIAGNOSIS — M54.2 NECK PAIN: ICD-10-CM

## 2023-09-15 PROCEDURE — 99214 OFFICE O/P EST MOD 30 MIN: CPT | Mod: VID | Performed by: NURSE PRACTITIONER

## 2023-09-15 RX ORDER — DULOXETIN HYDROCHLORIDE 30 MG/1
30 CAPSULE, DELAYED RELEASE ORAL 2 TIMES DAILY
Qty: 180 CAPSULE | Refills: 0 | Status: SHIPPED | OUTPATIENT
Start: 2023-09-15 | End: 2024-01-03

## 2023-09-15 RX ORDER — ALPRAZOLAM 0.5 MG
TABLET ORAL
Qty: 60 TABLET | Refills: 0 | Status: SHIPPED | OUTPATIENT
Start: 2023-09-15 | End: 2023-10-16

## 2023-09-15 ASSESSMENT — ENCOUNTER SYMPTOMS: BACK PAIN: 1

## 2023-09-15 NOTE — PROGRESS NOTES
"Linette is a 47 year old who is being evaluated via a billable video visit.      How would you like to obtain your AVS? MyChart  If the video visit is dropped, the invitation should be resent by: Text to cell phone: [phone]  Will anyone else be joining your video visit? No      Patient completed E-check in. Questionnaires were blown in and Patient checked in electronically without being called.     Assessment & Plan     Hospital discharge follow-up  Chart review for ED visit for neck/ back pain on 9/6/23 and 9/12/23.  Patient is followed by Pain mangement for her pain but has never seen Neurosurgery per her report.  Will obtain MRI and have her see Neurosurgery for review.    She had negative TSH, covid/ rsv/ flu, cbc, lipase, cmp- K 3.1, crp was 5.9, sed 21, CXR negative, CT head     Acute bilateral thoracic back pain  As above  - MR Thoracic Spine w/o Contrast; Future    Neck pain  As above  - MR Cervical Spine w/o Contrast; Future    Lumbar pain  As above  - MR Lumbar Spine w/o Contrast; Future  - Neurosurgery Referral; Future    Incidental finding of upper esophagus soft tissue thickening- she is followed by GI and will reach out with any changes- last endo 1/2023      32 minutes spent by me on the date of the encounter doing chart review, review of outside records, review of test results, interpretation of tests, patient visit, and documentation        BMI:   Estimated body mass index is 29.87 kg/m  as calculated from the following:    Height as of 2/27/23: 1.626 m (5' 4\").    Weight as of 9/6/23: 78.9 kg (174 lb).   Weight management plan: Discussed healthy diet and exercise guidelines        Gisselle Linn NP  [facility]   Linette is a 47 year old, presenting for the following health issues:   Follow Up, Back Pain, and Radiology Visit (Would like to discuss MRI)        9/15/2023     7:11 AM   Additional Questions   Roomed by Alberto ERICKSON   Accompanied by self         " 9/15/2023     7:11 AM   Patient Reported Additional Medications   Patient reports taking the following new medications None       History of Present Illness       Back Pain:  She presents for follow up of back pain. Patient's back pain is a chronic problem.  Location of back pain:  Right lower back, left lower back, right middle of back, left middle of back, right upper back, left upper back, right side of neck, left side of neck, right shoulder, left shoulder, right buttock, left buttock, right hip, left hip, right side of waist and left side of waist  Description of back pain: burning, cramping, dull ache, fullness, gnawing, sharp, shooting and stabbing  Back pain spreads: right buttocks, left buttocks, right thigh, left thigh, right knee, left knee, right foot, left foot, right shoulder, left shoulder, right side of neck and left side of neck    Since patient first noticed back pain, pain is: always present, but gets better and worse  Does back pain interfere with her job:  Not applicable       Mental Health Follow-up:  Patient presents to follow-up on Anxiety.    Patient's anxiety since last visit has been:  Worse  The patient is having other symptoms associated with anxiety.  Any significant life events: financial concerns, grief or loss and health concerns  Patient is feeling anxious or having panic attacks.  Patient has no concerns about alcohol or drug use.    Reason for visit:  To schedule MRI s: neck/back pain, Anxiety attacks  Symptom onset:  More than a month  Symptoms include:  Mid back pain/knot always present and radiates up and down my spine. Neck pain/knot always present and radiates to the base of my head, up into the top of my head. Burning sensation on left side of my neck up into base of head. Ears ringing,Neck snaps   Symptom intensity:  Severe  Symptom progression:  Worsening  Had these symptoms before:  No  What makes it worse:  Lifting, turning neck too fast,doing simple chores like  dishes,vacuuming,dusting,laundry,etc.  What makes it better:  Heating pad or ice pack on neck/back.    She eats 2-3 servings of fruits and vegetables daily.She consumes 2 sweetened beverage(s) daily.She exercises with enough effort to increase her heart rate 30 to 60 minutes per day.  She exercises with enough effort to increase her heart rate 5 days per week.   She is taking medications regularly.     Has been having neck and thoracic spine, lumbar   Neck and goes into base of head and neck all the way down her spine- two major spots are neck and mid back    Pain management with Jenny Ledbetter          Review of Systems   Musculoskeletal:  Positive for back pain.            Objective           Vitals:  No vitals were obtained today due to virtual visit.    Physical Exam   GENERAL: Healthy, alert and no distress  EYES: Eyes grossly normal to inspection.  No discharge or erythema, or obvious scleral/conjunctival abnormalities.  RESP: No audible wheeze, cough, or visible cyanosis.  No visible retractions or increased work of breathing.    SKIN: Visible skin clear. No significant rash, abnormal pigmentation or lesions.  NEURO: Cranial nerves grossly intact.  Mentation and speech appropriate for age.  PSYCH: Mentation appears normal, affect normal/bright, judgement and insight intact, normal speech and appearance well-groomed.    No results found.            Video-Visit Details    Type of service:  Video Visit   Video Start Time:  1032  Video End Time: 1045    Originating Location (pt. Location): Home    Distant Location (provider location):  On-site  Platform used for Video Visit: Mai

## 2023-09-18 ENCOUNTER — MYC MEDICAL ADVICE (OUTPATIENT)
Dept: GASTROENTEROLOGY | Facility: CLINIC | Age: 48
End: 2023-09-18
Payer: COMMERCIAL

## 2023-09-19 ENCOUNTER — VIRTUAL VISIT (OUTPATIENT)
Dept: PSYCHOLOGY | Facility: OTHER | Age: 48
End: 2023-09-19
Payer: COMMERCIAL

## 2023-09-19 DIAGNOSIS — F33.1 MODERATE EPISODE OF RECURRENT MAJOR DEPRESSIVE DISORDER (H): Primary | ICD-10-CM

## 2023-09-19 DIAGNOSIS — F41.1 GENERALIZED ANXIETY DISORDER: ICD-10-CM

## 2023-09-19 PROCEDURE — 90834 PSYTX W PT 45 MINUTES: CPT | Mod: VID | Performed by: COUNSELOR

## 2023-09-19 ASSESSMENT — ANXIETY QUESTIONNAIRES
2. NOT BEING ABLE TO STOP OR CONTROL WORRYING: NEARLY EVERY DAY
GAD7 TOTAL SCORE: 18
1. FEELING NERVOUS, ANXIOUS, OR ON EDGE: NEARLY EVERY DAY
IF YOU CHECKED OFF ANY PROBLEMS ON THIS QUESTIONNAIRE, HOW DIFFICULT HAVE THESE PROBLEMS MADE IT FOR YOU TO DO YOUR WORK, TAKE CARE OF THINGS AT HOME, OR GET ALONG WITH OTHER PEOPLE: EXTREMELY DIFFICULT
GAD7 TOTAL SCORE: 18
5. BEING SO RESTLESS THAT IT IS HARD TO SIT STILL: MORE THAN HALF THE DAYS
7. FEELING AFRAID AS IF SOMETHING AWFUL MIGHT HAPPEN: NEARLY EVERY DAY
3. WORRYING TOO MUCH ABOUT DIFFERENT THINGS: NEARLY EVERY DAY
4. TROUBLE RELAXING: NEARLY EVERY DAY
6. BECOMING EASILY ANNOYED OR IRRITABLE: SEVERAL DAYS

## 2023-09-19 ASSESSMENT — PATIENT HEALTH QUESTIONNAIRE - PHQ9
SUM OF ALL RESPONSES TO PHQ QUESTIONS 1-9: 17
SUM OF ALL RESPONSES TO PHQ QUESTIONS 1-9: 17
10. IF YOU CHECKED OFF ANY PROBLEMS, HOW DIFFICULT HAVE THESE PROBLEMS MADE IT FOR YOU TO DO YOUR WORK, TAKE CARE OF THINGS AT HOME, OR GET ALONG WITH OTHER PEOPLE: VERY DIFFICULT
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
## 2023-09-20 DIAGNOSIS — K21.00 GASTROESOPHAGEAL REFLUX DISEASE WITH ESOPHAGITIS WITHOUT HEMORRHAGE: Primary | ICD-10-CM

## 2023-09-21 ENCOUNTER — PRE VISIT (OUTPATIENT)
Dept: NEUROSURGERY | Facility: CLINIC | Age: 48
End: 2023-09-21

## 2023-09-21 NOTE — TELEPHONE ENCOUNTER
NEUROSURGERY- NEW PREVISIT PLANNING       Record Status/Location     Referring Provider Referral Gisselle Linn NP    Diagnosis Referral Lumbar pain    MRI (HEAD, NECK, SPINE)  Cerv, Thor and Lumb to be done 9/26   CT Pacs Cervical and Thoracic 9/7/23 [facility]    X-ray Na    INJECTION Pacs  Encounter 5/16/23 [facility] Jona Reynaga MD   7/1/22 Trevor Ivory MD    PHYSICAL THERAPY Encounters Dating back to 2007- Current , at [facility] & [facility]    SURGERY Na

## 2023-09-22 ENCOUNTER — TELEPHONE (OUTPATIENT)
Dept: FAMILY MEDICINE | Facility: CLINIC | Age: 48
End: 2023-09-22
Payer: COMMERCIAL

## 2023-09-22 NOTE — TELEPHONE ENCOUNTER
Patient reports that she has been having a lot going on in her life; pain, mental health, and other.    Patient did not go into detail about her concerns but states that she started filling out her the questionnaires for her appointment that she had to cancel yesterday.  Please see appointment encounter from yesterday 09/21/23 for further information regarding her concerns.  She did mention she wants to talk about disability/forms.    Patient scheduled an appointment for December but states she can not wait that long to be seen.  She is requesting a work in appointment.  Please advise on day/time to fit in, if appropriate/able.

## 2023-09-26 ENCOUNTER — HOSPITAL ENCOUNTER (OUTPATIENT)
Dept: MRI IMAGING | Facility: CLINIC | Age: 48
Discharge: HOME | End: 2023-09-26
Attending: NURSE PRACTITIONER | Admitting: NURSE PRACTITIONER
Payer: COMMERCIAL

## 2023-09-26 ENCOUNTER — TELEPHONE (OUTPATIENT)
Dept: GASTROENTEROLOGY | Facility: CLINIC | Age: 48
End: 2023-09-26
Payer: COMMERCIAL

## 2023-09-26 DIAGNOSIS — M54.50 LUMBAR PAIN: ICD-10-CM

## 2023-09-26 DIAGNOSIS — M54.6 ACUTE BILATERAL THORACIC BACK PAIN: ICD-10-CM

## 2023-09-26 DIAGNOSIS — M54.2 NECK PAIN: ICD-10-CM

## 2023-09-26 PROCEDURE — 72146 MRI CHEST SPINE W/O DYE: CPT

## 2023-09-26 PROCEDURE — 72148 MRI LUMBAR SPINE W/O DYE: CPT

## 2023-09-26 PROCEDURE — 72141 MRI NECK SPINE W/O DYE: CPT

## 2023-09-26 NOTE — TELEPHONE ENCOUNTER
"Endoscopy Scheduling Screen    Have you had a positive Covid test in the last 14 days?  No    Are you active on MyChart?   Yes    What insurance is in the chart?  Other:  BCBS    Ordering/Referring Provider:   KAJAL VILLAGOMEZ        (If ordering provider performs procedure, schedule with ordering provider unless otherwise instructed. )    BMI: Estimated body mass index is 29.87 kg/m  as calculated from the following:    Height as of 2/27/23: 1.626 m (5' 4\").    Weight as of 9/6/23: 78.9 kg (174 lb).     Sedation Ordered  MAC/deep sedation.   BMI<= 45 45 < BMI <= 48 48 < BMI < = 50  BMI > 50   No Restrictions No [facility]  No [facility]  [facility] with exceptions Hospital Only OR Only       Are you taking any prescription medications for pain 3 or more times per week?   No    Do you have a history of malignant hyperthermia or adverse reaction to anesthesia?  No    (Females) Are you currently pregnant?   No     Have you been diagnosed or told you have pulmonary hypertension?   No    Do you have an LVAD?  No    Have you been told you have moderate to severe sleep apnea?  No    Have you been told you have COPD, asthma, or any other lung disease?  No    Do you have any heart conditions?  No     Have you ever had an organ transplant?   No    Have you ever had or are you awaiting a heart or lung transplant?   No    Have you had a stroke or transient ischemic attack (TIA aka \"mini stroke\" in the last 6 months?   No    Have you been diagnosed with or been told you have cirrhosis of the liver?   No    Are you currently on dialysis?   No    Do you need assistance transferring?   No    BMI: Estimated body mass index is 29.87 kg/m  as calculated from the following:    Height as of 2/27/23: 1.626 m (5' 4\").    Weight as of 9/6/23: 78.9 kg (174 lb).     Is patients BMI > 40 and scheduling location [facility]?  No    Do you take an injectable medication for weight loss or diabetes (excluding insulin)?  No    Do you take the medication " Naltrexone?  No    Do you take blood thinners?  No       Prep   Are you currently on dialysis or do you have chronic kidney disease?  No    Do you have a diagnosis of diabetes?  No    Do you have a diagnosis of cystic fibrosis (CF)?  No    On a regular basis do you go 3 -5 days between bowel movements?  No    BMI > 40?  No    Preferred Pharmacy:    [facility] [place]  [place] - [address]   [address] [place] [address]  Phone: [phone] Fax: [phone]    [facility] [place], [place] - [address]  [address]  [facility] [address]  Phone: [phone] Fax: [phone]    [facility] [address] [facility]   [address] [facility] [address]  Phone: [phone] Fax: [phone]    [facility] [address]  [address] [place] [address]  Phone: [phone] Fax: [phone]    [facility] [address] [place], [place] - [address]  [address]  [facility] [address]  Phone: [phone] Fax: [phone]      Final Scheduling Details   Colonoscopy prep sent?  N/A    Procedure scheduled  Upper endoscopy (EGD)    Surgeon:  Avery     Date of procedure:  10/3     Pre-OP / PAC:   No - Not required for this site.    Location  [facility] - Patient preference.    Sedation   MAC/Deep Sedation - Per order.      Patient Reminders:   You will receive a call from a Nurse to review instructions and health history.  This assessment must be completed prior to your procedure.  Failure to complete the Nurse assessment may result in the procedure being cancelled.      On the day of your procedure, please designate an adult(s) who can drive you home stay with you for the next 24 hours. The medicines used in the exam will make you sleepy. You will not be able to drive.      You cannot take public transportation, ride share services, or non-medical taxi service without a responsible caregiver.  Medical transport  services are allowed with the requirement that a responsible caregiver will receive you at your destination.  We require that drivers and caregivers are confirmed prior to your procedure.

## 2023-09-28 ENCOUNTER — OFFICE VISIT (OUTPATIENT)
Dept: ORTHOPEDICS | Facility: CLINIC | Age: 48
End: 2023-09-28
Payer: COMMERCIAL

## 2023-09-28 ENCOUNTER — OFFICE VISIT (OUTPATIENT)
Dept: NEUROSURGERY | Facility: CLINIC | Age: 48
End: 2023-09-28
Attending: NURSE PRACTITIONER
Payer: COMMERCIAL

## 2023-09-28 VITALS
BODY MASS INDEX: 30.38 KG/M2 | OXYGEN SATURATION: 96 % | TEMPERATURE: 96.6 F | DIASTOLIC BLOOD PRESSURE: 68 MMHG | HEART RATE: 98 BPM | WEIGHT: 177 LBS | SYSTOLIC BLOOD PRESSURE: 116 MMHG

## 2023-09-28 VITALS
HEART RATE: 74 BPM | RESPIRATION RATE: 18 BRPM | DIASTOLIC BLOOD PRESSURE: 65 MMHG | HEIGHT: 64 IN | BODY MASS INDEX: 30.22 KG/M2 | SYSTOLIC BLOOD PRESSURE: 130 MMHG | WEIGHT: 177 LBS

## 2023-09-28 DIAGNOSIS — M54.2 CERVICALGIA: Primary | ICD-10-CM

## 2023-09-28 DIAGNOSIS — M54.41 CHRONIC BILATERAL LOW BACK PAIN WITH BILATERAL SCIATICA: ICD-10-CM

## 2023-09-28 DIAGNOSIS — G89.29 CHRONIC BILATERAL LOW BACK PAIN WITH BILATERAL SCIATICA: ICD-10-CM

## 2023-09-28 DIAGNOSIS — M54.42 CHRONIC BILATERAL LOW BACK PAIN WITH BILATERAL SCIATICA: ICD-10-CM

## 2023-09-28 DIAGNOSIS — R20.0 NUMBNESS OF ARM: Primary | ICD-10-CM

## 2023-09-28 PROCEDURE — 99213 OFFICE O/P EST LOW 20 MIN: CPT | Performed by: ORTHOPAEDIC SURGERY

## 2023-09-28 PROCEDURE — 99214 OFFICE O/P EST MOD 30 MIN: CPT | Performed by: NEUROLOGICAL SURGERY

## 2023-09-28 ASSESSMENT — PAIN SCALES - GENERAL: PAINLEVEL: SEVERE PAIN (7)

## 2023-09-28 NOTE — LETTER
9/28/2023         RE: Radha Beckwith  [id number] [facility] [address]        Dear Colleague,    Thank you for referring your patient, Radha Beckwith, to the [facility]. Please see a copy of my visit note below.    Radha Beckwith is a 47 year old female who is seen as self referral for complaint of numbness of bilateral hands in the palms and also dorsal fingers.  Small is sometimes involved.  Also complains of significant pain around the thumbs and along radial side of forearm.  Pain is sharp and is achey and throbbing.    She has had symptoms for 6 years.      Prior treatment used: Wrist splint - yes-has used several.  NSAID: - no. Reports history of ulcers.  No injections.  She tried voltaren gel, but this also gave her stomach ache.    Employment: not working. This is not work related.      PAST MEDICAL HISTORY:  Diabetes mellitus negative, hypothyroid negative.    EMG has been performed 8/8/23 and was normal.     Past Medical History:   Diagnosis Date     Abdominal pain, right lower quadrant 03/09/2008    Admit. Discharged 03/10/08     Anxiety attack 07/31/2015     Atypical chest pain 06/23/2015     De Quervain's disease (tenosynovitis)      Dehydration      Gastric ulcer 07/31/2015     GERD (gastroesophageal reflux disease) 07/28/2010    Takes omeprazole and metoclopramide      Ingrowing nail 01/09/2014     Migraines      Opioid dependence in remission (H) 08/02/2015     Other and unspecified ovarian cyst      Papanicolaou smear of cervix with low grade squamous intraepithelial lesion (LGSIL) 07/07/2017       Past Surgical History:   Procedure Laterality Date     BIOPSY CERVICAL, LOCAL EXCISION, SINGLE/MULTIPLE N/A 8/10/2017    Procedure: BIOPSY CERVICAL, LOCAL EXCISION, SINGLE/MULTIPLE;;  Surgeon: Michael Chandler MD;  Location: [facility] OR     COLONOSCOPY N/A 1/6/2023    Procedure: COLONOSCOPY;  Surgeon: Michael Dozier MD;  Location:  GI     COLPOSCOPY,  BIOPSY, COMBINED N/A 8/10/2017    Procedure: COMBINED COLPOSCOPY, BIOPSY;  Colposcopy with Cervical Biopsies and Endometrial Biopsy, Exam with Ultrasound;  Surgeon: Michael Chandler MD;  Location: [facility] OR     ESOPHAGOSCOPY, GASTROSCOPY, DUODENOSCOPY (EGD), COMBINED N/A 4/17/2017    Procedure: COMBINED ESOPHAGOSCOPY, GASTROSCOPY, DUODENOSCOPY (EGD);  Surgeon: Ibrahima Esposito MD;  Location: [facility] GI     ESOPHAGOSCOPY, GASTROSCOPY, DUODENOSCOPY (EGD), COMBINED N/A 1/6/2023    Procedure: ESOPHAGOGASTRODUODENOSCOPY, WITH BIOPSY;  Surgeon: Michael Dozier MD;  Location: [facility] GI     EXAM UNDER ANESTHESIA PELVIC N/A 8/10/2017    Procedure: EXAM UNDER ANESTHESIA PELVIC;;  Surgeon: Michael Chandler MD;  Location: [facility] OR     HYSTERECTOMY       HYSTERECTOMY, PAP NO LONGER INDICATED       INJECT EPIDURAL LUMBAR Bilateral 7/1/2022    Procedure: Lumbar 5-Sacral 1 Transforaminal Epidural Steroid Injection with fluoroscopic guidance and contrast, bilateral;  Surgeon: Trevor Ivory MD;  Location: [facility] OR     LAPAROSCOPIC CHOLECYSTECTOMY N/A 2/2/2018    Procedure: LAPAROSCOPIC CHOLECYSTECTOMY;  Laparoscopic Cholecystectomy;  Surgeon: Tigre Lowry DO;  Location: [facility] OR     LAPAROSCOPIC HYSTERECTOMY TOTAL N/A 10/30/2017    Procedure: LAPAROSCOPIC HYSTERECTOMY TOTAL;  LAPAROSCOPIC HYSTERECTOMY TOTAL POSSIBLE SALPINGO-OOPHERECTOMY (BILATERAL);  Surgeon: Michael Chandler MD;  Location: [facility] OR     [facility] UGI ENDOSCOPY, SIMPLE EXAM  01/07/08       Family History   Problem Relation Age of Onset     Depression Mother      Respiratory Mother      Chronic Obstructive Pulmonary Disease Mother      Cerebrovascular Disease Father         Brain anyeurism     Breast Cancer Cousin      Adrenal Disorder Other      Chronic Obstructive Pulmonary Disease Other        Social History     Socioeconomic History     Marital status: Single     Spouse name: Not on file     Number of children: Not on file     Years of education:  Not on file     Highest education level: Not on file   Occupational History     Not on file   Tobacco Use     Smoking status: Some Days     Packs/day: 0.25     Years: 20.00     Pack years: 5.00     Types: Cigarettes     Smokeless tobacco: Never   Vaping Use     Vaping Use: Some days     Substances: Nicotine, CBD     Devices: Refillable tank   Substance and Sexual Activity     Alcohol use: Yes     Comment: occasional drinks; about 5-6 beers/week     Drug use: No     Sexual activity: Yes     Partners: Male     Birth control/protection: Female Surgical   Other Topics Concern     Parent/sibling w/ CABG, MI or angioplasty before 65F 55M? No   Social History Narrative     Not on file     Social Determinants of Health     Financial Resource Strain: Not on file   Food Insecurity: Not on file   Transportation Needs: Not on file   Physical Activity: Not on file   Stress: Not on file   Social Connections: Not on file   Interpersonal Safety: Not on file   Housing Stability: Not on file       Current Outpatient Medications   Medication Sig Dispense Refill     acetaminophen (TYLENOL) 500 MG tablet Take 1,000 mg by mouth every 6 hours as needed for mild pain       ALPRAZolam (XANAX) 0.5 MG tablet TAKE 1 TABLET (0.5 MG) BY MOUTH TWO TIMES A DAY 60 tablet 0     baclofen (LIORESAL) 20 MG tablet TAKE 1 TABLET (20 MG) BY MOUTH 2 TIMES DAILY 60 tablet 0     bisacodyl (DULCOLAX) 5 MG EC tablet Take 1 tablet (5 mg) by mouth every other day Take every other day. If no bowel movement for 2 or more days, take 2 tablets of bisacodyl (10 mg) 30 tablet 3     buprenorphine HCl-naloxone HCl (SUBOXONE) 2-0.5 MG per film Place 0.5 Film under the tongue daily        cetirizine (ZYRTEC) 10 MG tablet TAKE ONE TABLET BY MOUTH EVERY MORNING (Patient taking differently: Take 10 mg by mouth daily) 90 tablet 3     cyclobenzaprine (FLEXERIL) 5 MG tablet TAKE ONE TO TWO TABLETS BY MOUTH THREE TIMES A DAY AS NEEDED FOR MUSCLE SPASMS 90 tablet 5     DULoxetine  (CYMBALTA) 30 MG capsule Take 1 capsule (30 mg) by mouth 2 times daily 180 capsule 0     famotidine (PEPCID) 40 MG tablet TAKE 1 TABLET (40 MG) BY MOUTH 2 TIMES DAILY 60 tablet 1     gabapentin (NEURONTIN) 300 MG capsule Start 300 mg at bedtime x 1-2 days, then increase to 300 mg BID x 3 days, then increase to 300 mg TID. 90 capsule 1     lactulose (CHRONULAC) 10 GM/15ML solution Take 15 mLs (10 g) by mouth 3 times daily as needed for constipation (as needed for severe constipaiton, no BM for more than 3 days and feeling constipated) 300 mL 3     ondansetron (ZOFRAN ODT) 4 MG ODT tab DISSOLVE ONE TABLET ON TONGUE EVERY SIX HOURS AS NEEDED FOR NAUSEA 20 tablet 1     pantoprazole (PROTONIX) 40 MG EC tablet Take 1 tablet (40 mg) by mouth 2 times daily (before meals) 60 tablet 5     polyethylene glycol (MIRALAX) 17 GM/Dose powder Take 17 g by mouth daily 578 g 3     predniSONE (DELTASONE) 20 MG tablet Take two tablets (= 40mg) each day for 5 (five) days 10 tablet 0     rOPINIRole (REQUIP) 1 MG tablet TAKE ONE TABLET BY MOUTH EVERY NIGHT AT BEDTIME 90 tablet 0     simvastatin (ZOCOR) 10 MG tablet TAKE ONE TABLET BY MOUTH EVERY NIGHT AT BEDTIME 30 tablet 0     traZODone (DESYREL) 50 MG tablet Take 1-2 tablets ( mg) by mouth nightly as needed for sleep 120 tablet 1     zolpidem (AMBIEN) 5 MG tablet TAKE ONE TABLET (5 MG) BY MOUTH NIGHTLY AS NEEDED FOR SLEEP 30 tablet 0       Allergies   Allergen Reactions     Ergotamine-Caffeine      12-            GI problems-     Seasonal Allergies      Sumatriptan      vomits after giving herself a shot     Compazine Anxiety     Droperidol Anxiety     Nubain [Nalbuphine Hcl] Anxiety     Prochlorperazine Palpitations     Uncontrolled movement       REVIEW OF SYSTEMS:  CONSTITUTIONAL:  NEGATIVE for fever, chills, change in weight, not feeling tired  SKIN:  NEGATIVE for worrisome rashes, no skin lumps, no skin ulcers and no non-healing wounds  EYES:  NEGATIVE for vision  "changes or irritation.  ENT/MOUTH:  NEGATIVE.  No hearing loss, no hoarseness, no difficulty swallowing.  RESP:  NEGATIVE. No cough or shortness of breath.  BREAST:  NEGATIVE for masses, tenderness or discharge  CV:  NEGATIVE for chest pain, palpitations or peripheral edema  GI:  NEGATIVE for nausea, abdominal pain, heartburn, or change in bowel habits  :  Negative. No dysuria, no hematuria  MUSCULOSKELETAL:  See HPI above  NEURO:  NEGATIVE . No headaches, no dizziness,  no numbness  ENDOCRINE:  NEGATIVE for temperature intolerance, skin/hair changes  HEME/ALLERGY/IMMUNE:  NEGATIVE for bleeding problems  PSYCHIATRIC:  NEGATIVE. no anxiety, no depression.          O: She appears well,   Vitals: /65   Pulse 74   Resp 18   Ht 1.626 m (5' 4\")   Wt 80.3 kg (177 lb)   LMP  (LMP Unknown)   BMI 30.38 kg/m    BMI= Body mass index is 30.38 kg/m .   Cervical spine has full range of motion without pain.  Full range of motion of shoulder, elbows, wrists and fingers.  Hand exam revealed     Right: reduced sensation of all fingers dorsal and volar + Phalen's maneuver, + weakness of thumb/pinky pincer grasp, negative Tinel's sign, no thenar atrophy, positive tenderness at first dorsal compartment tendons, but negative Finkelstein.  Occasional shooting pain along radial side of forearm.  She indicates discoloration here and appears to be prominent vein.  Left: reduced sensation along all fingers dorsal and volar,  + Phalen's maneuver, + Tinel's sign, no thenar atrophy, no weakness of thumb/pinky pincer grasp, positive tenderness at first dorsal compartment tendons, but negative Finkelstein..   strength: Right decreased, Left normal.    A: bilateral arm numbness not classic for carpal tunnel syndrome.  Normal EMG.  She reports they are pursuing cervical issues.  She had MRI 9/26/23 and they are planning epidural steroid injection.  Possible bilateral deQuervain's tenosynovitis.    P: she will pursue this as possible " cervical compression.  Otherwise consider repeat EMG in 6 months.      Again, thank you for allowing me to participate in the care of your patient.        Sincerely,        Yohan Gonsalves MD

## 2023-09-28 NOTE — NURSING NOTE
"Radha Beckwith is a 47 year old female who presents for:  Chief Complaint   Patient presents with    Neurologic Problem     Lumbar pain           Initial Vitals:  Pulse 98   Temp (!) 96.6  F (35.9  C) (Temporal)   Wt 177 lb (80.3 kg)   LMP  (LMP Unknown)   SpO2 96%   BMI 30.38 kg/m   Estimated body mass index is 30.38 kg/m  as calculated from the following:    Height as of 2/27/23: 5' 4\" (1.626 m).    Weight as of this encounter: 177 lb (80.3 kg).. Body surface area is 1.9 meters squared. BP completed using cuff size: regular  Severe Pain (7)    Nursing Comments:     Jory Clements    "

## 2023-09-28 NOTE — PROGRESS NOTES
Radha Beckwith is a 47 year old female who is seen as self referral for complaint of numbness of bilateral hands in the palms and also dorsal fingers.  Small is sometimes involved.  Also complains of significant pain around the thumbs and along radial side of forearm.  Pain is sharp and is achey and throbbing.    She has had symptoms for 6 years.      Prior treatment used: Wrist splint - yes-has used several.  NSAID: - no. Reports history of ulcers.  No injections.  She tried voltaren gel, but this also gave her stomach ache.    Employment: not working. This is not work related.      PAST MEDICAL HISTORY:  Diabetes mellitus negative, hypothyroid negative.    EMG has been performed 8/8/23 and was normal.     Past Medical History:   Diagnosis Date    Abdominal pain, right lower quadrant 03/09/2008    Admit. Discharged 03/10/08    Anxiety attack 07/31/2015    Atypical chest pain 06/23/2015    De Quervain's disease (tenosynovitis)     Dehydration     Gastric ulcer 07/31/2015    GERD (gastroesophageal reflux disease) 07/28/2010    Takes omeprazole and metoclopramide     Ingrowing nail 01/09/2014    Migraines     Opioid dependence in remission (H) 08/02/2015    Other and unspecified ovarian cyst     Papanicolaou smear of cervix with low grade squamous intraepithelial lesion (LGSIL) 07/07/2017       Past Surgical History:   Procedure Laterality Date    BIOPSY CERVICAL, LOCAL EXCISION, SINGLE/MULTIPLE N/A 8/10/2017    Procedure: BIOPSY CERVICAL, LOCAL EXCISION, SINGLE/MULTIPLE;;  Surgeon: Michael Chandler MD;  [facility]    COLONOSCOPY N/A 1/6/2023    Procedure: COLONOSCOPY;  Surgeon: Michael Dozier MD;  [facility]    COLPOSCOPY, BIOPSY, COMBINED N/A 8/10/2017    Procedure: COMBINED COLPOSCOPY, BIOPSY;  Colposcopy with Cervical Biopsies and Endometrial Biopsy, Exam with Ultrasound;  Surgeon: Michael Chandler MD;  [facility]    ESOPHAGOSCOPY, GASTROSCOPY, DUODENOSCOPY (EGD),  COMBINED N/A 4/17/2017    Procedure: COMBINED ESOPHAGOSCOPY, GASTROSCOPY, DUODENOSCOPY (EGD);  Surgeon: Ibrahima Esposito MD;  [facility]    ESOPHAGOSCOPY, GASTROSCOPY, DUODENOSCOPY (EGD), COMBINED N/A 1/6/2023    Procedure: ESOPHAGOGASTRODUODENOSCOPY, WITH BIOPSY;  Surgeon: Michael Dozier MD;  [facility]    EXAM UNDER ANESTHESIA PELVIC N/A 8/10/2017    Procedure: EXAM UNDER ANESTHESIA PELVIC;;  Surgeon: Michael Chandler MD;  [facility]    HYSTERECTOMY      HYSTERECTOMY, PAP NO LONGER INDICATED      INJECT EPIDURAL LUMBAR Bilateral 7/1/2022    Procedure: Lumbar 5-Sacral 1 Transforaminal Epidural Steroid Injection with fluoroscopic guidance and contrast, bilateral;  Surgeon: Trevor Ivory MD;  [facility]    LAPAROSCOPIC CHOLECYSTECTOMY N/A 2/2/2018    Procedure: LAPAROSCOPIC CHOLECYSTECTOMY;  Laparoscopic Cholecystectomy;  Surgeon: Tigre Lowry DO;  [facility]    LAPAROSCOPIC HYSTERECTOMY TOTAL N/A 10/30/2017    Procedure: LAPAROSCOPIC HYSTERECTOMY TOTAL;  LAPAROSCOPIC HYSTERECTOMY TOTAL POSSIBLE SALPINGO-OOPHERECTOMY (BILATERAL);  Surgeon: Michael Chandler MD;  [facility]    [facility] UGI ENDOSCOPY, SIMPLE EXAM  01/07/08       Family History   Problem Relation Age of Onset    Depression Mother     Respiratory Mother     Chronic Obstructive Pulmonary Disease Mother     Cerebrovascular Disease Father         Brain anyeurism    Breast Cancer Cousin     Adrenal Disorder Other     Chronic Obstructive Pulmonary Disease Other        Social History     Socioeconomic History    Marital status: Single     Spouse name: Not on file    Number of children: Not on file    Years of education: Not on file    Highest education level: Not on file   Occupational History    Not on file   Tobacco Use    Smoking status: Some Days     Packs/day: 0.25     Years: 20.00     Pack years: 5.00     Types: Cigarettes    Smokeless tobacco: Never   Vaping Use    Vaping Use: Some days     Substances: Nicotine, CBD    Devices: [employer] tank   Substance and Sexual Activity    Alcohol use: Yes     Comment: occasional drinks; about 5-6 beers/week    Drug use: No    Sexual activity: Yes     Partners: Male     Birth control/protection: Female Surgical   Other Topics Concern    Parent/sibling w/ CABG, MI or angioplasty before 65F 55M? No   Social History Narrative    Not on file     Social Determinants of Health     Financial Resource Strain: Not on file   Food Insecurity: Not on file   Transportation Needs: Not on file   Physical Activity: Not on file   Stress: Not on file   Social Connections: Not on file   Interpersonal Safety: Not on file   Housing Stability: Not on file       Current Outpatient Medications   Medication Sig Dispense Refill    acetaminophen (TYLENOL) 500 MG tablet Take 1,000 mg by mouth every 6 hours as needed for mild pain      ALPRAZolam (XANAX) 0.5 MG tablet TAKE 1 TABLET (0.5 MG) BY MOUTH TWO TIMES A DAY 60 tablet 0    baclofen (LIORESAL) 20 MG tablet TAKE 1 TABLET (20 MG) BY MOUTH 2 TIMES DAILY 60 tablet 0    bisacodyl (DULCOLAX) 5 MG EC tablet Take 1 tablet (5 mg) by mouth every other day Take every other day. If no bowel movement for 2 or more days, take 2 tablets of bisacodyl (10 mg) 30 tablet 3    buprenorphine HCl-naloxone HCl (SUBOXONE) 2-0.5 MG per film Place 0.5 Film under the tongue daily       cetirizine (ZYRTEC) 10 MG tablet TAKE ONE TABLET BY MOUTH EVERY MORNING (Patient taking differently: Take 10 mg by mouth daily) 90 tablet 3    cyclobenzaprine (FLEXERIL) 5 MG tablet TAKE ONE TO TWO TABLETS BY MOUTH THREE TIMES A DAY AS NEEDED FOR MUSCLE SPASMS 90 tablet 5    DULoxetine (CYMBALTA) 30 MG capsule Take 1 capsule (30 mg) by mouth 2 times daily 180 capsule 0    famotidine (PEPCID) 40 MG tablet TAKE 1 TABLET (40 MG) BY MOUTH 2 TIMES DAILY 60 tablet 1    gabapentin (NEURONTIN) 300 MG capsule Start 300 mg at bedtime x 1-2 days, then increase to 300 mg BID x 3 days, then  increase to 300 mg TID. 90 capsule 1    lactulose (CHRONULAC) 10 GM/15ML solution Take 15 mLs (10 g) by mouth 3 times daily as needed for constipation (as needed for severe constipaiton, no BM for more than 3 days and feeling constipated) 300 mL 3    ondansetron (ZOFRAN ODT) 4 MG ODT tab DISSOLVE ONE TABLET ON TONGUE EVERY SIX HOURS AS NEEDED FOR NAUSEA 20 tablet 1    pantoprazole (PROTONIX) 40 MG EC tablet Take 1 tablet (40 mg) by mouth 2 times daily (before meals) 60 tablet 5    polyethylene glycol (MIRALAX) 17 GM/Dose powder Take 17 g by mouth daily 578 g 3    predniSONE (DELTASONE) 20 MG tablet Take two tablets (= 40mg) each day for 5 (five) days 10 tablet 0    rOPINIRole (REQUIP) 1 MG tablet TAKE ONE TABLET BY MOUTH EVERY NIGHT AT BEDTIME 90 tablet 0    simvastatin (ZOCOR) 10 MG tablet TAKE ONE TABLET BY MOUTH EVERY NIGHT AT BEDTIME 30 tablet 0    traZODone (DESYREL) 50 MG tablet Take 1-2 tablets ( mg) by mouth nightly as needed for sleep 120 tablet 1    zolpidem (AMBIEN) 5 MG tablet TAKE ONE TABLET (5 MG) BY MOUTH NIGHTLY AS NEEDED FOR SLEEP 30 tablet 0       Allergies   Allergen Reactions    Ergotamine-Caffeine      12-            GI problems-    Seasonal Allergies     Sumatriptan      vomits after giving herself a shot    Compazine Anxiety    Droperidol Anxiety    Nubain [Nalbuphine Hcl] Anxiety    Prochlorperazine Palpitations     Uncontrolled movement       REVIEW OF SYSTEMS:  CONSTITUTIONAL:  NEGATIVE for fever, chills, change in weight, not feeling tired  SKIN:  NEGATIVE for worrisome rashes, no skin lumps, no skin ulcers and no non-healing wounds  EYES:  NEGATIVE for vision changes or irritation.  ENT/MOUTH:  NEGATIVE.  No hearing loss, no hoarseness, no difficulty swallowing.  RESP:  NEGATIVE. No cough or shortness of breath.  BREAST:  NEGATIVE for masses, tenderness or discharge  CV:  NEGATIVE for chest pain, palpitations or peripheral edema  GI:  NEGATIVE for nausea, abdominal pain,  "heartburn, or change in bowel habits  :  Negative. No dysuria, no hematuria  MUSCULOSKELETAL:  See HPI above  NEURO:  NEGATIVE . No headaches, no dizziness,  no numbness  ENDOCRINE:  NEGATIVE for temperature intolerance, skin/hair changes  HEME/ALLERGY/IMMUNE:  NEGATIVE for bleeding problems  PSYCHIATRIC:  NEGATIVE. no anxiety, no depression.          O: She appears well,   Vitals: /65   Pulse 74   Resp 18   Ht 1.626 m (5' 4\")   Wt 80.3 kg (177 lb)   LMP  (LMP Unknown)   BMI 30.38 kg/m    BMI= Body mass index is 30.38 kg/m .   Cervical spine has full range of motion without pain.  Full range of motion of shoulder, elbows, wrists and fingers.  Hand exam revealed     Right: reduced sensation of all fingers dorsal and volar + Phalen's maneuver, + weakness of thumb/pinky pincer grasp, negative Tinel's sign, no thenar atrophy, positive tenderness at first dorsal compartment tendons, but negative Finkelstein.  Occasional shooting pain along radial side of forearm.  She indicates discoloration here and appears to be prominent vein.  Left: reduced sensation along all fingers dorsal and volar,  + Phalen's maneuver, + Tinel's sign, no thenar atrophy, no weakness of thumb/pinky pincer grasp, positive tenderness at first dorsal compartment tendons, but negative Finkelstein..   strength: Right decreased, Left normal.    A: bilateral arm numbness not classic for carpal tunnel syndrome.  Normal EMG.  She reports they are pursuing cervical issues.  She had MRI 9/26/23 and they are planning epidural steroid injection.  Possible bilateral deQuervain's tenosynovitis.    P: she will pursue this as possible cervical compression.  Otherwise consider repeat EMG in 6 months.  "

## 2023-09-28 NOTE — LETTER
9/28/2023         RE: Radha Beckwith  [address]  [facility] [address]        Dear Colleague,    Thank you for referring your patient, Radha Beckwith, to the [facility] [place]. Please see a copy of my visit note below.    I was asked by Dr. Linn to see this patient in consultation    47 year old female with neck and back pain, cervical and lumbar spondylosis.  6 months of daily throbbing neck pain radiating to the bilateral shoulders.  Also feels some burning and numbness in her hands.  UE EMG was normal.  Also with throbbing low back pain radiating to the left>right lateral thighs.  Worse with standing and activity.  Past bilateral SI joint injections without improvement.  MR Cervical, personally reviewed, with mild spondylotic change, moderate right C4-5 foraminal stenosis.  MR Lumbar, personally reviewed, with mild degenerative change, mild left L5-S1 foraminal stenosis.       Past Medical History:   Diagnosis Date     Abdominal pain, right lower quadrant 03/09/2008    Admit. Discharged 03/10/08     Anxiety attack 07/31/2015     Atypical chest pain 06/23/2015     De Quervain's disease (tenosynovitis)      Dehydration      Gastric ulcer 07/31/2015     GERD (gastroesophageal reflux disease) 07/28/2010    Takes omeprazole and metoclopramide      Ingrowing nail 01/09/2014     Migraines      Opioid dependence in remission (H) 08/02/2015     Other and unspecified ovarian cyst      Papanicolaou smear of cervix with low grade squamous intraepithelial lesion (LGSIL) 07/07/2017     Past Surgical History:   Procedure Laterality Date     BIOPSY CERVICAL, LOCAL EXCISION, SINGLE/MULTIPLE N/A 8/10/2017    Procedure: BIOPSY CERVICAL, LOCAL EXCISION, SINGLE/MULTIPLE;;  Surgeon: Michael Chandler MD;  [facility]     COLONOSCOPY N/A 1/6/2023    Procedure: COLONOSCOPY;  Surgeon: Michael Dozier MD;  Location:  GI     COLPOSCOPY, BIOPSY, COMBINED N/A 8/10/2017     Procedure: COMBINED COLPOSCOPY, BIOPSY;  Colposcopy with Cervical Biopsies and Endometrial Biopsy, Exam with Ultrasound;  Surgeon: Michael Chandler MD;  [facility]     ESOPHAGOSCOPY, GASTROSCOPY, DUODENOSCOPY (EGD), COMBINED N/A 4/17/2017    Procedure: COMBINED ESOPHAGOSCOPY, GASTROSCOPY, DUODENOSCOPY (EGD);  Surgeon: Ibrahima Esposito MD;  [facility]     ESOPHAGOSCOPY, GASTROSCOPY, DUODENOSCOPY (EGD), COMBINED N/A 1/6/2023    Procedure: ESOPHAGOGASTRODUODENOSCOPY, WITH BIOPSY;  Surgeon: Michael Dozier MD;  [facility]     EXAM UNDER ANESTHESIA PELVIC N/A 8/10/2017    Procedure: EXAM UNDER ANESTHESIA PELVIC;;  Surgeon: Michael Chandler MD;  [facility]     HYSTERECTOMY       HYSTERECTOMY, PAP NO LONGER INDICATED       INJECT EPIDURAL LUMBAR Bilateral 7/1/2022    Procedure: Lumbar 5-Sacral 1 Transforaminal Epidural Steroid Injection with fluoroscopic guidance and contrast, bilateral;  Surgeon: Trevor Ivory MD;  [facility]     LAPAROSCOPIC CHOLECYSTECTOMY N/A 2/2/2018    Procedure: LAPAROSCOPIC CHOLECYSTECTOMY;  Laparoscopic Cholecystectomy;  Surgeon: Tigre Lowry DO;  [facility]     LAPAROSCOPIC HYSTERECTOMY TOTAL N/A 10/30/2017    Procedure: LAPAROSCOPIC HYSTERECTOMY TOTAL;  LAPAROSCOPIC HYSTERECTOMY TOTAL POSSIBLE SALPINGO-OOPHERECTOMY (BILATERAL);  Surgeon: Michael Chandler MD;  [facility]     [facility] UGI ENDOSCOPY, SIMPLE EXAM  01/07/08     Social History     Socioeconomic History     Marital status: Single     Spouse name: Not on file     Number of children: Not on file     Years of education: Not on file     Highest education level: Not on file   Occupational History     Not on file   Tobacco Use     Smoking status: Some Days     Packs/day: 0.25     Years: 20.00     Pack years: 5.00     Types: Cigarettes     Smokeless tobacco: Never   Vaping Use     Vaping Use: Some days     Substances: Nicotine, CBD     Devices: Refillable tank    Substance and Sexual Activity     Alcohol use: Yes     Comment: occasional drinks; about 5-6 beers/week     Drug use: No     Sexual activity: Yes     Partners: Male     Birth control/protection: Female Surgical   Other Topics Concern     Parent/sibling w/ CABG, MI or angioplasty before 65F 55M? No   Social History Narrative     Not on file     Social Determinants of Health     Financial Resource Strain: Not on file   Food Insecurity: Not on file   Transportation Needs: Not on file   Physical Activity: Not on file   Stress: Not on file   Social Connections: Not on file   Interpersonal Safety: Not on file   Housing Stability: Not on file     Family History   Problem Relation Age of Onset     Depression Mother      Respiratory Mother      Chronic Obstructive Pulmonary Disease Mother      Cerebrovascular Disease Father         Brain anyeurism     Breast Cancer Cousin      Adrenal Disorder Other      Chronic Obstructive Pulmonary Disease Other         ROS: 10 point ROS neg other than the symptoms noted above in the HPI.    Physical Exam  /68   Pulse 98   Temp (!) 96.6  F (35.9  C) (Temporal)   Wt 80.3 kg (177 lb)   LMP  (LMP Unknown)   SpO2 96%   BMI 30.38 kg/m    HEENT:  Normocephalic, atraumatic.  PERRLA.  EOM s intact.  Visual fields full to gross exam  Neck:  Supple, non-tender, without lymphadenopathy.  Heart:  No peripheral edema  Lungs:  No SOB  Abdomen:  Non-distended.   Skin:  Warm and dry.  Extremities:  No edema, cyanosis or clubbing.  Psychiatric:  No apparent distress  Musculoskeletal:  Normal bulk and tone    NEUROLOGICAL EXAMINATION:     Mental status:  Alert and Oriented x 3, speech is fluent.  Cranial nerves:  II-XII intact.   Motor:    Shoulder Abduction:  Right:  5/5   Left:  5/5  Biceps:                      Right:  5/5   Left:  5/5  Triceps:                     Right:  5/5   Left:  5/5  Wrist Extensors:       Right:  5/5   Left:  5/5  Wrist Flexors:           Right:  5/5   Left:   5/5  interosseus :            Right:  5/5   Left:  5/5  Hip Flexion:                Right: 5/5  Left:  5/5  Quadriceps:             Right:  5/5  Left:  5/5  Hamstrings:             Right:  5/5  Left:  5/5  Gastroc Soleus:        Right:  5/5  Left:  5/5  Tib/Ant:                      Right:  5/5  Left:  5/5  EHL:                     Right:  5/5  Left:  5/5  Sensation:  Intact  Reflexes:  Negative Babinski.  Negative Clonus.  Negative Neville's.  Coordination:  Smooth finger to nose testing.   Negative pronator drift.  Smooth tandem walking.  Pain to palpation over bilateral SI joints    A/P:  47 year old female with neck and back pain, cervical and lumbar spondylosis    I had a discussion with the patient, reviewing the history, symptoms, and imaging  MR Cervical and Lumbar without high grade findings  Will order Cervical JASE  Will refer to Dr. Rosario to assess for pelvic joint dysfunction          Again, thank you for allowing me to participate in the care of your patient.        Sincerely,        Azam Arteaga MD

## 2023-09-28 NOTE — PATIENT INSTRUCTIONS
Ordered a cervical Epidural Steroid Injection at [facility]. [facility] will call you within 48 hours to schedule this. If you don't hear from them, please schedule by calling Operating Room scheduling team s phone number: [phone], option 2. If symptoms continue 2 weeks after injections, call our clinic and talk to a nurse.    Ordered a referral to see Dr. Guille Rosario at [facility]. [facility] will call to schedule. If you don't hear from them within 2 business day, call [phone] to schedule.    [facility]  [facility] - [address] [address]  Telephone:  [phone]       Fax:  [phone]

## 2023-09-29 NOTE — PROGRESS NOTES
I was asked by Dr. Linn to see this patient in consultation    47 year old female with neck and back pain, cervical and lumbar spondylosis.  6 months of daily throbbing neck pain radiating to the bilateral shoulders.  Also feels some burning and numbness in her hands.  UE EMG was normal.  Also with throbbing low back pain radiating to the left>right lateral thighs.  Worse with standing and activity.  Past bilateral SI joint injections without improvement.  MR Cervical, personally reviewed, with mild spondylotic change, moderate right C4-5 foraminal stenosis.  MR Lumbar, personally reviewed, with mild degenerative change, mild left L5-S1 foraminal stenosis.       Past Medical History:   Diagnosis Date    Abdominal pain, right lower quadrant 03/09/2008    Admit. Discharged 03/10/08    Anxiety attack 07/31/2015    Atypical chest pain 06/23/2015    De Quervain's disease (tenosynovitis)     Dehydration     Gastric ulcer 07/31/2015    GERD (gastroesophageal reflux disease) 07/28/2010    Takes omeprazole and metoclopramide     Ingrowing nail 01/09/2014    Migraines     Opioid dependence in remission (H) 08/02/2015    Other and unspecified ovarian cyst     Papanicolaou smear of cervix with low grade squamous intraepithelial lesion (LGSIL) 07/07/2017     Past Surgical History:   Procedure Laterality Date    BIOPSY CERVICAL, LOCAL EXCISION, SINGLE/MULTIPLE N/A 8/10/2017    Procedure: BIOPSY CERVICAL, LOCAL EXCISION, SINGLE/MULTIPLE;;  Surgeon: Michael Chandler MD;  [facility]    COLONOSCOPY N/A 1/6/2023    Procedure: COLONOSCOPY;  Surgeon: Michael Dozier MD;  Location:  GI    COLPOSCOPY, BIOPSY, COMBINED N/A 8/10/2017    Procedure: COMBINED COLPOSCOPY, BIOPSY;  Colposcopy with Cervical Biopsies and Endometrial Biopsy, Exam with Ultrasound;  Surgeon: Michael Chandler MD;  [facility]    ESOPHAGOSCOPY, GASTROSCOPY, DUODENOSCOPY (EGD), COMBINED N/A 4/17/2017    Procedure: COMBINED  ESOPHAGOSCOPY, GASTROSCOPY, DUODENOSCOPY (EGD);  Surgeon: Ibrahima Esposito MD;  [facility]    ESOPHAGOSCOPY, GASTROSCOPY, DUODENOSCOPY (EGD), COMBINED N/A 1/6/2023    Procedure: ESOPHAGOGASTRODUODENOSCOPY, WITH BIOPSY;  Surgeon: Michael Dozier MD;  [facility]    EXAM UNDER ANESTHESIA PELVIC N/A 8/10/2017    Procedure: EXAM UNDER ANESTHESIA PELVIC;;  Surgeon: Michael Chandler MD;  [facility]    HYSTERECTOMY      HYSTERECTOMY, PAP NO LONGER INDICATED      INJECT EPIDURAL LUMBAR Bilateral 7/1/2022    Procedure: Lumbar 5-Sacral 1 Transforaminal Epidural Steroid Injection with fluoroscopic guidance and contrast, bilateral;  Surgeon: Trevor Ivory MD;  [facility]    LAPAROSCOPIC CHOLECYSTECTOMY N/A 2/2/2018    Procedure: LAPAROSCOPIC CHOLECYSTECTOMY;  Laparoscopic Cholecystectomy;  Surgeon: Tigre Lowry DO;  [facility]    LAPAROSCOPIC HYSTERECTOMY TOTAL N/A 10/30/2017    Procedure: LAPAROSCOPIC HYSTERECTOMY TOTAL;  LAPAROSCOPIC HYSTERECTOMY TOTAL POSSIBLE SALPINGO-OOPHERECTOMY (BILATERAL);  Surgeon: Michael Chandler MD;  [facility]: [facility]    [facility] UGI ENDOSCOPY, SIMPLE EXAM  01/07/08     Social History     Socioeconomic History    Marital status: Single     Spouse name: Not on file    Number of children: Not on file    Years of education: Not on file    Highest education level: Not on file   Occupational History    Not on file   Tobacco Use    Smoking status: Some Days     Packs/day: 0.25     Years: 20.00     Pack years: 5.00     Types: Cigarettes    Smokeless tobacco: Never   Vaping Use    Vaping Use: Some days    Substances: Nicotine, CBD    Devices: Refillable tank   Substance and Sexual Activity    Alcohol use: Yes     Comment: occasional drinks; about 5-6 beers/week    Drug use: No    Sexual activity: Yes     Partners: Male     Birth control/protection: Female Surgical   Other Topics Concern    Parent/sibling w/ CABG, MI or angioplasty before 65F 55M? No    Social History Narrative    Not on file     Social Determinants of Health     Financial Resource Strain: Not on file   Food Insecurity: Not on file   Transportation Needs: Not on file   Physical Activity: Not on file   Stress: Not on file   Social Connections: Not on file   Interpersonal Safety: Not on file   Housing Stability: Not on file     Family History   Problem Relation Age of Onset    Depression Mother     Respiratory Mother     Chronic Obstructive Pulmonary Disease Mother     Cerebrovascular Disease Father         Brain anyeurism    Breast Cancer Cousin     Adrenal Disorder Other     Chronic Obstructive Pulmonary Disease Other         ROS: 10 point ROS neg other than the symptoms noted above in the HPI.    Physical Exam  /68   Pulse 98   Temp (!) 96.6  F (35.9  C) (Temporal)   Wt 80.3 kg (177 lb)   LMP  (LMP Unknown)   SpO2 96%   BMI 30.38 kg/m    HEENT:  Normocephalic, atraumatic.  PERRLA.  EOM s intact.  Visual fields full to gross exam  Neck:  Supple, non-tender, without lymphadenopathy.  Heart:  No peripheral edema  Lungs:  No SOB  Abdomen:  Non-distended.   Skin:  Warm and dry.  Extremities:  No edema, cyanosis or clubbing.  Psychiatric:  No apparent distress  Musculoskeletal:  Normal bulk and tone    NEUROLOGICAL EXAMINATION:     Mental status:  Alert and Oriented x 3, speech is fluent.  Cranial nerves:  II-XII intact.   Motor:    Shoulder Abduction:  Right:  5/5   Left:  5/5  Biceps:                      Right:  5/5   Left:  5/5  Triceps:                     Right:  5/5   Left:  5/5  Wrist Extensors:       Right:  5/5   Left:  5/5  Wrist Flexors:           Right:  5/5   Left:  5/5  interosseus :            Right:  5/5   Left:  5/5  Hip Flexion:                Right: 5/5  Left:  5/5  Quadriceps:             Right:  5/5  Left:  5/5  Hamstrings:             Right:  5/5  Left:  5/5  Gastroc Soleus:        Right:  5/5  Left:  5/5  Tib/Ant:                      Right:  5/5  Left:  5/5  EHL:                      Right:  5/5  Left:  5/5  Sensation:  Intact  Reflexes:  Negative Babinski.  Negative Clonus.  Negative Neville's.  Coordination:  Smooth finger to nose testing.   Negative pronator drift.  Smooth tandem walking.  Pain to palpation over bilateral SI joints    A/P:  47 year old female with neck and back pain, cervical and lumbar spondylosis    I had a discussion with the patient, reviewing the history, symptoms, and imaging  MR Cervical and Lumbar without high grade findings  Will order Cervical JASE  Will refer to Dr. Rosario to assess for pelvic joint dysfunction

## 2023-10-02 NOTE — H&P
[facility] Anesthesia Pre-op History and Physical    Radha Beckwith MRN# [id number]   Age: 47 year old YOB: 1975      Date of Surgery: 10/3/2023 Location [facility]      Date of Exam 10/3/2023 Facility (In hospital)       Home clinic: [facility]  Primary care provider: Gisselle Linn         Chief Complaint and/or Reason for Procedure:   No chief complaint on file.  EGD. gerd  Exam Jan 2023 neg Hp. Dysphagia and odynophagia.       Active problem list:     Patient Active Problem List    Diagnosis Date Noted    Chronic midline low back pain without sciatica 06/15/2023     Priority: Medium    Chronic neck pain 06/15/2023     Priority: Medium    Moderate episode of recurrent major depressive disorder (H) 06/29/2022     Priority: Medium    Moderate major depression (H) 06/03/2019     Priority: Medium    Supraventricular tachycardia 06/03/2019     Priority: Medium    Psychophysiological insomnia 12/30/2017     Priority: Medium    Chronic bilateral low back pain without sciatica 12/30/2017     Priority: Medium    Opioid dependence in remission (H) 08/02/2015     Priority: Medium     On suboxone treatement with Bryant Harkins.  (Noted in 2015).        Anxiety attack 07/31/2015     Priority: Medium    Hypokalemia 06/23/2015     Priority: Medium    Tobacco abuse 06/23/2015     Priority: Medium    Hyperlipidemia LDL goal <100 01/29/2014     Priority: Medium    Anxiety 08/19/2013     Priority: Medium    GERD (gastroesophageal reflux disease) 07/28/2010     Priority: Medium     Takes omeprazole and metoclopramide      Restless legs 07/28/2010     Priority: Medium            Medications (include herbals and vitamins):   Any Plavix use in the last 7 days? No     No current facility-administered medications for this encounter.     Current Outpatient Medications   Medication Sig    acetaminophen (TYLENOL) 500 MG tablet Take 1,000 mg  by mouth every 6 hours as needed for mild pain    ALPRAZolam (XANAX) 0.5 MG tablet TAKE 1 TABLET (0.5 MG) BY MOUTH TWO TIMES A DAY    baclofen (LIORESAL) 20 MG tablet TAKE 1 TABLET (20 MG) BY MOUTH 2 TIMES DAILY    bisacodyl (DULCOLAX) 5 MG EC tablet Take 1 tablet (5 mg) by mouth every other day Take every other day. If no bowel movement for 2 or more days, take 2 tablets of bisacodyl (10 mg)    buprenorphine HCl-naloxone HCl (SUBOXONE) 2-0.5 MG per film Place 0.5 Film under the tongue daily     cetirizine (ZYRTEC) 10 MG tablet TAKE ONE TABLET BY MOUTH EVERY MORNING (Patient taking differently: Take 10 mg by mouth daily)    cyclobenzaprine (FLEXERIL) 5 MG tablet TAKE ONE TO TWO TABLETS BY MOUTH THREE TIMES A DAY AS NEEDED FOR MUSCLE SPASMS    DULoxetine (CYMBALTA) 30 MG capsule Take 1 capsule (30 mg) by mouth 2 times daily    famotidine (PEPCID) 40 MG tablet TAKE 1 TABLET (40 MG) BY MOUTH 2 TIMES DAILY    gabapentin (NEURONTIN) 300 MG capsule Start 300 mg at bedtime x 1-2 days, then increase to 300 mg BID x 3 days, then increase to 300 mg TID.    lactulose (CHRONULAC) 10 GM/15ML solution Take 15 mLs (10 g) by mouth 3 times daily as needed for constipation (as needed for severe constipaiton, no BM for more than 3 days and feeling constipated)    ondansetron (ZOFRAN ODT) 4 MG ODT tab DISSOLVE ONE TABLET ON TONGUE EVERY SIX HOURS AS NEEDED FOR NAUSEA    pantoprazole (PROTONIX) 40 MG EC tablet Take 1 tablet (40 mg) by mouth 2 times daily (before meals)    polyethylene glycol (MIRALAX) 17 GM/Dose powder Take 17 g by mouth daily    predniSONE (DELTASONE) 20 MG tablet Take two tablets (= 40mg) each day for 5 (five) days    rOPINIRole (REQUIP) 1 MG tablet TAKE ONE TABLET BY MOUTH EVERY NIGHT AT BEDTIME    simvastatin (ZOCOR) 10 MG tablet TAKE ONE TABLET BY MOUTH EVERY NIGHT AT BEDTIME    traZODone (DESYREL) 50 MG tablet Take 1-2 tablets ( mg) by mouth nightly as needed for sleep    zolpidem (AMBIEN) 5 MG tablet TAKE  ONE TABLET (5 MG) BY MOUTH NIGHTLY AS NEEDED FOR SLEEP             Allergies:      Allergies   Allergen Reactions    Ergotamine-Caffeine      12-            GI problems-    Seasonal Allergies     Sumatriptan      vomits after giving herself a shot    Compazine Anxiety    Droperidol Anxiety    Nubain [Nalbuphine Hcl] Anxiety    Prochlorperazine Palpitations     Uncontrolled movement     Allergy to Latex? No  Allergy to tape?   No  Intolerances:             Physical Exam:   All vitals have been reviewed  No data found.  No intake/output data recorded.  Lungs:   No increased work of breathing, good air exchange, clear to auscultation bilaterally, no crackles or wheezing     Cardiovascular:   Normal apical impulse, regular rate and rhythm, normal S1 and S2, no S3 or S4, and no murmur noted             Lab / Radiology Results:            Anesthetic risk and/or ASA classification:       John Paul Varela MD

## 2023-10-03 ENCOUNTER — ANESTHESIA (OUTPATIENT)
Dept: GASTROENTEROLOGY | Facility: CLINIC | Age: 48
End: 2023-10-03
Payer: COMMERCIAL

## 2023-10-03 ENCOUNTER — MYC MEDICAL ADVICE (OUTPATIENT)
Dept: PALLIATIVE MEDICINE | Facility: CLINIC | Age: 48
End: 2023-10-03
Payer: COMMERCIAL

## 2023-10-03 ENCOUNTER — HOSPITAL ENCOUNTER (OUTPATIENT)
Facility: CLINIC | Age: 48
Discharge: HOME | End: 2023-10-03
Attending: INTERNAL MEDICINE | Admitting: INTERNAL MEDICINE
Payer: COMMERCIAL

## 2023-10-03 ENCOUNTER — ANESTHESIA EVENT (OUTPATIENT)
Dept: GASTROENTEROLOGY | Facility: CLINIC | Age: 48
End: 2023-10-03
Payer: COMMERCIAL

## 2023-10-03 VITALS
TEMPERATURE: 98.2 F | SYSTOLIC BLOOD PRESSURE: 89 MMHG | RESPIRATION RATE: 16 BRPM | HEART RATE: 78 BPM | DIASTOLIC BLOOD PRESSURE: 61 MMHG | OXYGEN SATURATION: 95 %

## 2023-10-03 LAB — UPPER GI ENDOSCOPY: NORMAL

## 2023-10-03 PROCEDURE — 370N000017 HC ANESTHESIA TECHNICAL FEE, PER MIN: Performed by: INTERNAL MEDICINE

## 2023-10-03 PROCEDURE — 250N000009 HC RX 250: Performed by: NURSE ANESTHETIST, CERTIFIED REGISTERED

## 2023-10-03 PROCEDURE — 43239 EGD BIOPSY SINGLE/MULTIPLE: CPT | Performed by: INTERNAL MEDICINE

## 2023-10-03 PROCEDURE — 258N000003 HC RX IP 258 OP 636: Performed by: NURSE ANESTHETIST, CERTIFIED REGISTERED

## 2023-10-03 PROCEDURE — 88305 TISSUE EXAM BY PATHOLOGIST: CPT | Mod: 26 | Performed by: PATHOLOGY

## 2023-10-03 PROCEDURE — 88305 TISSUE EXAM BY PATHOLOGIST: CPT | Mod: TC | Performed by: INTERNAL MEDICINE

## 2023-10-03 PROCEDURE — 250N000011 HC RX IP 250 OP 636: Performed by: NURSE ANESTHETIST, CERTIFIED REGISTERED

## 2023-10-03 RX ORDER — LIDOCAINE HYDROCHLORIDE 20 MG/ML
INJECTION, SOLUTION INFILTRATION; PERINEURAL PRN
Status: DISCONTINUED | OUTPATIENT
Start: 2023-10-03 | End: 2023-10-03

## 2023-10-03 RX ORDER — SODIUM CHLORIDE, SODIUM LACTATE, POTASSIUM CHLORIDE, CALCIUM CHLORIDE 600; 310; 30; 20 MG/100ML; MG/100ML; MG/100ML; MG/100ML
INJECTION, SOLUTION INTRAVENOUS CONTINUOUS
Status: DISCONTINUED | OUTPATIENT
Start: 2023-10-03 | End: 2023-10-03 | Stop reason: HOSPADM

## 2023-10-03 RX ORDER — PROPOFOL 10 MG/ML
INJECTION, EMULSION INTRAVENOUS PRN
Status: DISCONTINUED | OUTPATIENT
Start: 2023-10-03 | End: 2023-10-03

## 2023-10-03 RX ORDER — PROPOFOL 10 MG/ML
INJECTION, EMULSION INTRAVENOUS CONTINUOUS PRN
Status: DISCONTINUED | OUTPATIENT
Start: 2023-10-03 | End: 2023-10-03

## 2023-10-03 RX ADMIN — PROPOFOL 100 MG: 10 INJECTION, EMULSION INTRAVENOUS at 13:12

## 2023-10-03 RX ADMIN — LIDOCAINE HYDROCHLORIDE 50 MG: 20 INJECTION, SOLUTION INFILTRATION; PERINEURAL at 13:12

## 2023-10-03 RX ADMIN — SODIUM CHLORIDE, POTASSIUM CHLORIDE, SODIUM LACTATE AND CALCIUM CHLORIDE: 600; 310; 30; 20 INJECTION, SOLUTION INTRAVENOUS at 12:22

## 2023-10-03 RX ADMIN — PROPOFOL 200 MCG/KG/MIN: 10 INJECTION, EMULSION INTRAVENOUS at 13:12

## 2023-10-03 ASSESSMENT — ACTIVITIES OF DAILY LIVING (ADL)
ADLS_ACUITY_SCORE: 35
ADLS_ACUITY_SCORE: 33

## 2023-10-03 ASSESSMENT — PAIN SCALES - PAIN ENJOYMENT GENERAL ACTIVITY SCALE (PEG)
INTERFERED_GENERAL_ACTIVITY: 10 - COMPLETELY INTERFERES
AVG_PAIN_PASTWEEK: 8
PEG_TOTALSCORE: 9.33
INTERFERED_ENJOYMENT_LIFE: 10 - COMPLETELY INTERFERES

## 2023-10-03 ASSESSMENT — LIFESTYLE VARIABLES: TOBACCO_USE: 1

## 2023-10-03 ASSESSMENT — ENCOUNTER SYMPTOMS: DYSRHYTHMIAS: 1

## 2023-10-03 NOTE — ANESTHESIA POSTPROCEDURE EVALUATION
Patient: Radha Beckwith    Procedure: Procedure(s):  ESOPHAGOGASTRODUODENOSCOPY, WITH BIOPSY       Anesthesia Type:  MAC    Note:  Disposition: Outpatient   Postop Pain Control: Uneventful            Sign Out: Well controlled pain   PONV: No   Neuro/Psych: Uneventful            Sign Out: Acceptable/Baseline neuro status   Airway/Respiratory: Uneventful            Sign Out: Acceptable/Baseline resp. status   CV/Hemodynamics: Uneventful            Sign Out: Acceptable CV status   Other NRE: NONE   DID A NON-ROUTINE EVENT OCCUR? No    Event details/Postop Comments:  Pt was happy with anesthesia care.  No complications.  I will follow up with the pt if needed.           Last vitals:  Vitals Value Taken Time   BP 89/61 10/03/23 1410   Temp     Pulse 75 10/03/23 1410   Resp 16 10/03/23 1347   SpO2 94 % 10/03/23 1403   Vitals shown include unvalidated device data.    Electronically Signed By: JEANNIE Keita CRNA  October 3, 2023  2:15 PM

## 2023-10-03 NOTE — ANESTHESIA CARE TRANSFER NOTE
Patient: Radha Beckwith    Procedure: Procedure(s):  ESOPHAGOGASTRODUODENOSCOPY, WITH BIOPSY       Diagnosis: Gastroesophageal reflux disease with esophagitis without hemorrhage [K21.00]  Diagnosis Additional Information: No value filed.    Anesthesia Type:   MAC     Note:    Oropharynx: oropharynx clear of all foreign objects and spontaneously breathing  Level of Consciousness: awake  Oxygen Supplementation: room air    Independent Airway: airway patency satisfactory and stable  Dentition: dentition unchanged  Vital Signs Stable: post-procedure vital signs reviewed and stable  Report to RN Given: handoff report given  Patient transferred to: Phase II    Handoff Report: Identifed the Patient, Identified the Reponsible Provider, Reviewed the pertinent medical history, Discussed the surgical course, Reviewed Intra-OP anesthesia mangement and issues during anesthesia, Set expectations for post-procedure period and Allowed opportunity for questions and acknowledgement of understanding      Vitals:  Vitals Value Taken Time   BP 89/61 10/03/23 1410   Temp     Pulse 75 10/03/23 1410   Resp 16 10/03/23 1347   SpO2 94 % 10/03/23 1403   Vitals shown include unvalidated device data.    Electronically Signed By: JEANNIE Keita CRNA  October 3, 2023  2:15 PM

## 2023-10-03 NOTE — DISCHARGE INSTRUCTIONS
[facility]    Home Care Following Endoscopy          Activity:  You have just undergone an endoscopic procedure usually performed with conscious sedation.  Do not work or operate machinery (including a car) for at least 12 hours.    I encourage you to walk and attempt to pass this air as soon as possible.    Diet:  Return to the diet you were on before your procedure but eat lightly for the first 12-24 hours.  Drink plenty of water.  Resume any regular medications unless otherwise advised by your physician.  Please begin any new medication prescribed as a result of your procedure as directed by your physician.   If you had any biopsy or polyp removed please refrain from aspirin or aspirin products for 2 days.  If on Coumadin please restart as instructed by your physician.   Pain:  You may take Tylenol as needed for pain.  Expected Recovery:  You can expect some mild abdominal fullness and/or discomfort due to the air used to inflate your intestinal tract. It is also normal to have a mild sore throat after upper endoscopy.    Call Your Physician if You Have:  After Upper Endoscopy:  Shoulder, back or chest pain.  Difficulty breathing or swallowing.  Vomiting blood.    Any questions or concerns about your recovery, please call [phone] or after [facility] ([phone]) Nurse Advice Line.    Follow-up Care:  You did have biopsy tissue sample(s) removed.  The biopsy tissue sample(s) will be sent to pathology.    You should receive letter in your My Chart from Dr. Varela with your results within 1-2 weeks. If you do not participate in My Chart a physical letter will come in the mail in 2-3 weeks.  Please call if you have not received a notification of your results.  If asked to return to clinic please make an appointment 1 week after your procedure.  Call [phone].

## 2023-10-03 NOTE — LETTER
October 9, 2023      Linette Beckwith  [id number] [facility] [address]        Dear ,    We are writing to inform you of your test results.    Your test results fall within the expected range(s) or remain unchanged from previous results.  Please continue with current treatment plan.    Resulted Orders   Surgical Pathology Exam   Result Value Ref Range    Case Report       Surgical Pathology Report                         Case: [id number]                                  Authorizing Provider:  John Paul Varela MD        Collected:           10/03/2023 01:16 PM          Ordering Location:     [facility]          Received:            10/03/2023 01:31 PM                                 [facility] Endoscopy                                                          Pathologist:           Gama Louie MD                                                            Specimen:    Esophagus, Proximal/Distal esophagus biopsies                                              Final Diagnosis       Esophagus, proximal and distal, biopsies:  --No significant histopathologic change.          Clinical Information       Procedure:  ESOPHAGOGASTRODUODENOSCOPY, WITH BIOPSY  Pre-op Diagnosis: Gastroesophageal reflux disease with esophagitis without hemorrhage [K21.00]  Post-op Diagnosis: K21.00 - Gastroesophageal reflux disease with esophagitis without hemorrhage [ICD-10-CM]      Gross Description       A(1). Esophagus, Proximal/Distal esophagus biopsies:  The specimen is received in formalin, labeled with the patient's name, medical record number and other identifying information and designated  proximal/distal esophageal biopsy . It consists of 5 tan soft tissue fragments ranging from 0.1-0.2 cm. Entirely submitted in one cassette.   [DONAL Dubois(ASCP)]      Microscopic Description       Microscopic examination was performed.        Performing Labs       The technical component of this testing was completed  at [facility]      Case Images         If you have any questions or concerns, please call the clinic at the number listed above.       Sincerely,      John Paul Varela MD

## 2023-10-03 NOTE — ANESTHESIA PREPROCEDURE EVALUATION
Anesthesia Pre-Procedure Evaluation    Patient: Radha Beckwith   MRN: [id number] : 1975        Procedure : Procedure(s):  EGD         Past Medical History:   Diagnosis Date    Abdominal pain, right lower quadrant 2008    Admit. Discharged 03/10/08    Anxiety attack 2015    Atypical chest pain 2015    De Quervain's disease (tenosynovitis)     Dehydration     Gastric ulcer 2015    GERD (gastroesophageal reflux disease) 2010    Takes omeprazole and metoclopramide     Ingrowing nail 2014    Migraines     Opioid dependence in remission (H) 2015    Other and unspecified ovarian cyst     Papanicolaou smear of cervix with low grade squamous intraepithelial lesion (LGSIL) 2017      Past Surgical History:   Procedure Laterality Date    BIOPSY CERVICAL, LOCAL EXCISION, SINGLE/MULTIPLE N/A 8/10/2017    Procedure: BIOPSY CERVICAL, LOCAL EXCISION, SINGLE/MULTIPLE;;  Surgeon: Michael Chandler MD;  Location[facility]    COLONOSCOPY N/A 2023    Procedure: COLONOSCOPY;  Surgeon: Michael Dozier MD;  Location: [facility] GI    COLPOSCOPY, BIOPSY, COMBINED N/A 8/10/2017    Procedure: COMBINED COLPOSCOPY, BIOPSY;  Colposcopy with Cervical Biopsies and Endometrial Biopsy, Exam with Ultrasound;  Surgeon: Michael Chandler MD;  Location: [facility]    ESOPHAGOSCOPY, GASTROSCOPY, DUODENOSCOPY (EGD), COMBINED N/A 2017    Procedure: COMBINED ESOPHAGOSCOPY, GASTROSCOPY, DUODENOSCOPY (EGD);  Surgeon: Ibrahima Esposito MD;  Location[facility]    ESOPHAGOSCOPY, GASTROSCOPY, DUODENOSCOPY (EGD), COMBINED N/A 2023    Procedure: ESOPHAGOGASTRODUODENOSCOPY, WITH BIOPSY;  Surgeon: Michael Dozier MD;  Location[facility]    EXAM UNDER ANESTHESIA PELVIC N/A 8/10/2017    Procedure: EXAM UNDER ANESTHESIA PELVIC;;  Surgeon: Michael Chandler MD;  Location[facility]    HYSTERECTOMY      HYSTERECTOMY, PAP NO LONGER INDICATED      INJECT EPIDURAL LUMBAR Bilateral 2022     Procedure: Lumbar 5-Sacral 1 Transforaminal Epidural Steroid Injection with fluoroscopic guidance and contrast, bilateral;  Surgeon: Trevor Ivory MD;  Location: [facility]    LAPAROSCOPIC CHOLECYSTECTOMY N/A 2/2/2018    Procedure: LAPAROSCOPIC CHOLECYSTECTOMY;  Laparoscopic Cholecystectomy;  Surgeon: Tigre Lowry DO;  Location: [facility]    LAPAROSCOPIC HYSTERECTOMY TOTAL N/A 10/30/2017    Procedure: LAPAROSCOPIC HYSTERECTOMY TOTAL;  LAPAROSCOPIC HYSTERECTOMY TOTAL POSSIBLE SALPINGO-OOPHERECTOMY (BILATERAL);  Surgeon: Michael Chandler MD;  Location:  OR    [facility] UGI ENDOSCOPY, SIMPLE EXAM  01/07/08      Allergies   Allergen Reactions    Ergotamine-Caffeine      12-            GI problems-    Seasonal Allergies     Sumatriptan      vomits after giving herself a shot    Compazine Anxiety    Droperidol Anxiety    Nubain [Nalbuphine Hcl] Anxiety    Prochlorperazine Palpitations     Uncontrolled movement      Social History     Tobacco Use    Smoking status: Some Days     Packs/day: 0.25     Years: 20.00     Pack years: 5.00     Types: Cigarettes    Smokeless tobacco: Never   Substance Use Topics    Alcohol use: Yes     Comment: occasional drinks; about 5-6 beers/week      Wt Readings from Last 1 Encounters:   09/28/23 80.3 kg (177 lb)        Anesthesia Evaluation   Pt has had prior anesthetic. Type: General and MAC.    No history of anesthetic complications       ROS/MED HX  ENT/Pulmonary:     (+)                tobacco use, Current use,  5  Pack-Year Hx,                     Neurologic:  - neg neurologic ROS     Cardiovascular:     (+) Dyslipidemia - -   -  - -                        dysrhythmias (SVT), Other,        Previous cardiac testing   Echo: Date: Results:    Stress Test:  Date: Results:    ECG Reviewed:  Date: 11/24/19 Results:  SR  Cath:  Date: Results:      METS/Exercise Tolerance:     Hematologic:  - neg hematologic  ROS     Musculoskeletal:       GI/Hepatic:     (+) GERD,  Asymptomatic on medication,                  Renal/Genitourinary:  - neg Renal ROS     Endo:  - neg endo ROS     Psychiatric/Substance Use:     (+) psychiatric history depression and anxiety  H/O chronic opiod use . : opiod dependence un remnissions     Infectious Disease:  - neg infectious disease ROS     Malignancy:  - neg malignancy ROS     Other:  - neg other ROS    (+)  , H/O Chronic Pain,         Physical Exam    Airway  airway exam normal      Mallampati: II   TM distance: > 3 FB   Neck ROM: full   Mouth opening: > 3 cm    Respiratory Devices and Support         Dental     Comment: Pt states that all her teeth are loose. She will be seeing the dentist this year to address.       B=Bridge, C=Chipped, L=Loose, M=Missing    Cardiovascular   cardiovascular exam normal       Rhythm and rate: regular and normal     Pulmonary   pulmonary exam normal        breath sounds clear to auscultation           OUTSIDE LABS:  CBC:   Lab Results   Component Value Date    WBC 8.0 09/13/2023    WBC 6.1 09/06/2023    HGB 12.8 09/13/2023    HGB 12.4 09/06/2023    HCT 38.4 09/13/2023    HCT 36.5 09/06/2023     09/13/2023     09/06/2023     BMP:   Lab Results   Component Value Date     09/13/2023     09/06/2023    POTASSIUM 3.5 09/13/2023    POTASSIUM 3.1 (L) 09/06/2023    CHLORIDE 101 09/13/2023    CHLORIDE 98 09/06/2023    CO2 24 09/13/2023    CO2 25 09/06/2023    BUN 8.2 09/13/2023    BUN 12.0 09/06/2023    CR 0.68 09/13/2023    CR 0.76 09/06/2023     (H) 09/13/2023     (H) 09/06/2023     COAGS:   Lab Results   Component Value Date    PTT 24 03/24/2017    INR 0.87 03/24/2017     POC:   Lab Results   Component Value Date    HCG Negative 05/22/2013    HCGS Negative 09/06/2023     HEPATIC:   Lab Results   Component Value Date    ALBUMIN 4.3 09/13/2023    PROTTOTAL 7.2 09/13/2023    ALT 25 09/13/2023    AST 24 09/13/2023    ALKPHOS 87 09/13/2023    BILITOTAL <0.2 09/13/2023     OTHER:   Lab  Results   Component Value Date    A1C 5.0 11/24/2019    MOISES 9.3 09/13/2023    PHOS 2.7 08/17/2007    MAG 1.6 08/17/2007    LIPASE 20 09/13/2023    AMYLASE 37 01/24/2018    TSH 2.58 09/06/2023    CRP 6.3 03/02/2021    SED 17 09/13/2023       Anesthesia Plan    ASA Status:  2    NPO Status:  NPO Appropriate    Anesthesia Type: MAC.      Maintenance: TIVA.        Consents    Anesthesia Plan(s) and associated risks, benefits, and realistic alternatives discussed. Questions answered and patient/representative(s) expressed understanding.     - Discussed: Risks, Benefits and Alternatives for BOTH SEDATION and the PROCEDURE were discussed     - Discussed with:  Patient       Use of blood products discussed: No .     Postoperative Care            Comments:    Other Comments: The risks and benefits of anesthesia, and the alternatives where applicable, have been discussed with the patient, and they wish to proceed.            JEANNIE Keita CRNA

## 2023-10-04 ENCOUNTER — VIRTUAL VISIT (OUTPATIENT)
Dept: PALLIATIVE MEDICINE | Facility: CLINIC | Age: 48
End: 2023-10-04
Payer: COMMERCIAL

## 2023-10-04 ENCOUNTER — TELEPHONE (OUTPATIENT)
Dept: PALLIATIVE MEDICINE | Facility: CLINIC | Age: 48
End: 2023-10-04

## 2023-10-04 DIAGNOSIS — M54.6 CHRONIC BILATERAL THORACIC BACK PAIN: ICD-10-CM

## 2023-10-04 DIAGNOSIS — M53.3 SI (SACROILIAC) JOINT DYSFUNCTION: Primary | ICD-10-CM

## 2023-10-04 DIAGNOSIS — M53.3 PAIN OF BOTH SACROILIAC JOINTS: ICD-10-CM

## 2023-10-04 DIAGNOSIS — M54.10 RADICULAR PAIN OF UPPER EXTREMITY: ICD-10-CM

## 2023-10-04 DIAGNOSIS — M54.42 CHRONIC BILATERAL LOW BACK PAIN WITH BILATERAL SCIATICA: ICD-10-CM

## 2023-10-04 DIAGNOSIS — M54.2 CHRONIC NECK PAIN: ICD-10-CM

## 2023-10-04 DIAGNOSIS — M79.18 MYOFASCIAL PAIN: ICD-10-CM

## 2023-10-04 DIAGNOSIS — G89.29 CHRONIC BILATERAL THORACIC BACK PAIN: ICD-10-CM

## 2023-10-04 DIAGNOSIS — G89.29 CHRONIC NECK PAIN: ICD-10-CM

## 2023-10-04 DIAGNOSIS — M54.41 CHRONIC BILATERAL LOW BACK PAIN WITH BILATERAL SCIATICA: ICD-10-CM

## 2023-10-04 DIAGNOSIS — G89.29 CHRONIC BILATERAL LOW BACK PAIN WITH BILATERAL SCIATICA: ICD-10-CM

## 2023-10-04 PROCEDURE — 99215 OFFICE O/P EST HI 40 MIN: CPT | Mod: VID

## 2023-10-04 RX ORDER — LIDOCAINE 50 MG/G
1 PATCH TOPICAL EVERY 24 HOURS
Qty: 30 PATCH | Refills: 1 | Status: SHIPPED | OUTPATIENT
Start: 2023-10-04 | End: 2023-12-12

## 2023-10-04 RX ORDER — GABAPENTIN 300 MG/1
CAPSULE ORAL
Qty: 90 CAPSULE | Refills: 1 | Status: SHIPPED | OUTPATIENT
Start: 2023-10-04 | End: 2023-12-06

## 2023-10-04 ASSESSMENT — ANXIETY QUESTIONNAIRES
GAD7 TOTAL SCORE: 19
GAD7 TOTAL SCORE: 19
6. BECOMING EASILY ANNOYED OR IRRITABLE: NEARLY EVERY DAY
5. BEING SO RESTLESS THAT IT IS HARD TO SIT STILL: MORE THAN HALF THE DAYS
3. WORRYING TOO MUCH ABOUT DIFFERENT THINGS: NEARLY EVERY DAY
2. NOT BEING ABLE TO STOP OR CONTROL WORRYING: NEARLY EVERY DAY
IF YOU CHECKED OFF ANY PROBLEMS ON THIS QUESTIONNAIRE, HOW DIFFICULT HAVE THESE PROBLEMS MADE IT FOR YOU TO DO YOUR WORK, TAKE CARE OF THINGS AT HOME, OR GET ALONG WITH OTHER PEOPLE: EXTREMELY DIFFICULT
7. FEELING AFRAID AS IF SOMETHING AWFUL MIGHT HAPPEN: NEARLY EVERY DAY
1. FEELING NERVOUS, ANXIOUS, OR ON EDGE: NEARLY EVERY DAY
4. TROUBLE RELAXING: MORE THAN HALF THE DAYS

## 2023-10-04 ASSESSMENT — PAIN SCALES - PAIN ENJOYMENT GENERAL ACTIVITY SCALE (PEG)
AVG_PAIN_PASTWEEK: 8
INTERFERED_GENERAL_ACTIVITY: 10 - COMPLETELY INTERFERES
INTERFERED_ENJOYMENT_LIFE: 10 - COMPLETELY INTERFERES
PEG_TOTALSCORE: 9.33

## 2023-10-04 ASSESSMENT — PAIN SCALES - GENERAL: PAINLEVEL: EXTREME PAIN (8)

## 2023-10-04 NOTE — TELEPHONE ENCOUNTER
Prior Authorization Specialty Medication Request    Medication/Dose: lidocaine (LIDODERM) 5 % patch  ICD code (if different than what is on RX):    Chronic neck pain [M54.2, G89.29]      Myofascial pain [M79.18]      Chronic bilateral thoracic back pain [M54.6, G89.29]        Previously Tried and Failed:  ...    Important Lab Values: ...  Rationale: ...    Insurance Name: BLUE PLUS - BLUE PLUS [place]  Subscriber:  Linette Beckwith  Subscriber ID:[id number]  Relationship:Self  Member:Linette Beckwith  Member ID:[id number]  LOB:None  Plan year:  1/1/2018 - [place]  Effective dates:  1/1/2017 - 9/1/2018  Group number:  [id number]    Pharmacy Information (if different than what is on RX)  Name:  ...  Phone:  ...

## 2023-10-04 NOTE — PROGRESS NOTES
Linette is a 47 year old who is being evaluated via a billable video visit.      How would you like to obtain your AVS? MyChart  If the video visit is dropped, the invitation should be resent by: Text to cell phone: [phone]  Will anyone else be joining your video visit? No  Is Pt currently in [place]? Yes    NOTE:  If Pt is not in [place], Appointment needs to be canceled and rescheduled.

## 2023-10-04 NOTE — NURSING NOTE
7/13/2023     1:46 PM 9/7/2023     3:03 PM 10/4/2023     1:02 PM   PEG Score   PEG Total Score 7 8.67 9.33

## 2023-10-04 NOTE — PATIENT INSTRUCTIONS
Pain Physical Therapy:  YES   She is working with Meme Peacock PT, though she noted at prior visit therapy has been somewhat limited due to hypersensitivity to touch.  Recommend continue working with pain PT.     Pain Psychologist to address relaxation, behavioral change, coping style, and other factors important to improvement.  NO     Diagnostic Studies:  Reviewed recent cervical, thoracic, and lumbar MRIs.     Medication Management:   Increase gabapentin - current dose 300 mg three times daily. Increase by 300 mg every 3 days until goal dose of 600 mg three times daily achieved. Monitor for changes in baseline pain levels, intensity of fluctuations, and improvements in sensitivity to light touch. Will consider dosage increase at follow up, pending outcome with initial dosing.   Monitor for sedation - may cause dizziness or drowsiness. Be careful driving/moving around until you know effects of medication. Avoid concurrent alcohol use.   Continue 20 mg twice daily.  She reports small improvement after starting baclofen. Will plan to continue current dose for now.   Flexeril - continue 5-10 mg twice daily as needed. She takes in morning and afternoon, appreciates benefit for significant myofascial tightness.   Allow 4-6 hours in between all muscle relaxant doses, avoid concurrent alcohol use.   Medical cannabis - she inquired about medical cannabis today. Advised her on lack of FDA approval/regulation and out of pocket costs (no insurance coverage). Encouraged her to visit [place] Department of Health website to further explore medical cannabis program. Will consider certification at follow up.      Potential procedures:   She had bilateral SI joint injections with Dr. Reynaga on 5/16/2023.  She reports significant benefit for at least 2-3 months, though pain has since returned, and she is interested in repeat injection. Orders placed today.   She was referred to neurosurgery after recent ED visit, C7-T1 JASE recommended.  Discussed today, encouraged her to call to schedule. Pending outcome, may consider alternative injections (TPI, CMBB/RFA) pending physical exam findings.      Other Orders/Referrals:   Recommend using TENS unit a few times throughout the day as needed, particularly when engaging in activities that may increase pain.      Follow up with JEANNIE Schroeder CNP in 6-8 weeks for IN PERSON visit.     ----------------------------------------------------------------  Clinic Number:  [phone]   Call with any questions about your care and for scheduling assistance.   Calls are returned Monday through Friday between 8 AM and 4:30 PM. We usually get back to you within 2 business days depending on the issue/request.    If we are prescribing your medications:  For opioid medication refills, call the clinic or send a [facility] message 7 days in advance.  Please include:  Name of requested medication  Name of the pharmacy.  For non-opioid medications, call your pharmacy directly to request a refill. Please allow 3-4 days to be processed.   Per [place] State Law:  All controlled substance prescriptions must be filled within 30 days of being written.    For those controlled substances allowing refills, pickup must occur within 30 days of last fill.      We believe regular attendance is key to your success in our program!    Any time you are unable to keep your appointment we ask that you call us at least 24 hours in advance to cancel.This will allow us to offer the appointment time to another patient.   Multiple missed appointments may lead to dismissal from the clinic.

## 2023-10-04 NOTE — PROGRESS NOTES
Video-Visit Details    Type of service:  Video Visit     Originating Location (pt. Location): Home    Distant Location (provider location):  On-site  Platform used for Video Visit: [facility]     Date of visit: 10/4/2023      Assessment:   Radha Beckwith is a 47 year old female with a past medical history significant for chronic bilateral low back pain, depression, SVT, opioid dependence in remission, anxiety, tobacco use, GERD, restless legs who presents with complaints of neck pain, mid and low back.      Low back pain - Onset of pain within last 2 years. Pain is mostly axial, though she does have intermittent radicular leg symptoms, occasionally extends below the knee. On exam, positive facet loading and positive KATYH, sacral thrust and Yeoman's bilaterally. Positive myofascial TTP, negative SLR and neuro exam WNL. Etiology is likely associated with multiple factors, including underlying degenerative changes of lumbar spine, facet and SIJ mediated components, with prominent overlying myofascial component.   Neck/thoracic back pain - Onset of pain within last 2 years. On exam, positive for myofascial TTP, most notably in lower thoracic paraspinals. Etiology is likely associated with multiple factors, including underlying degenerative changes, with prominent overlying myofascial component.     Assigned to [facility] nursing team.     Visit Diagnoses:  1. SI (sacroiliac) joint dysfunction    2. Chronic neck pain    3. Radicular pain of upper extremity    4. Pain of both sacroiliac joints    5. Chronic bilateral low back pain with bilateral sciatica    6. Myofascial pain    7. Chronic bilateral thoracic back pain        Plan:  Diagnosis reviewed, treatment option addressed, and risk/benifits discussed.  Self-care instructions given.  I am recommending a multidisciplinary treatment plan to help this patient better manage their pain.      Pain Physical Therapy:  YES   She is working  with Meme Peacock PT, though she noted at prior visit therapy has been somewhat limited due to hypersensitivity to touch.  Recommend continue working with pain PT.     Pain Psychologist to address relaxation, behavioral change, coping style, and other factors important to improvement.  NO     Diagnostic Studies:  Reviewed recent cervical, thoracic, and lumbar MRIs.     Medication Management:   Increase gabapentin - current dose 300 mg three times daily. Increase by 300 mg every 3 days until goal dose of 600 mg three times daily achieved. Monitor for changes in baseline pain levels, intensity of fluctuations, and improvements in sensitivity to light touch. Will consider dosage increase at follow up, pending outcome with initial dosing.   Monitor for sedation - may cause dizziness or drowsiness. Be careful driving/moving around until you know effects of medication. Avoid concurrent alcohol use.   Continue 20 mg twice daily.  She reports small improvement after starting baclofen. Will plan to continue current dose for now.   Flexeril - continue 5-10 mg twice daily as needed. She takes in morning and afternoon, appreciates benefit for significant myofascial tightness.   Allow 4-6 hours in between all muscle relaxant doses, avoid concurrent alcohol use.   Medical cannabis - she inquired about medical cannabis today. Advised her on lack of FDA approval/regulation and out of pocket costs (no insurance coverage). Encouraged her to visit [facility] website to further explore medical cannabis program. Will consider certification at follow up.      Potential procedures:   She had bilateral SI joint injections with Dr. Reynaga on 5/16/2023.  She reports significant benefit for at least 2-3 months, though pain has since returned, and she is interested in repeat injection. Orders placed today.   She was referred to neurosurgery after recent ED visit, C7-T1 JASE recommended. Discussed today, encouraged her to call to  schedule. Pending outcome, may consider alternative injections (TPI, CMBB/RFA) pending physical exam findings.      Other Orders/Referrals:   Recommend using TENS unit a few times throughout the day as needed, particularly when engaging in activities that may increase pain.      Follow up with JEANNIE Schroeder CNP in 6-8 weeks for IN PERSON visit.     Review of Electronic Chart: Today I have also reviewed available medical information in the patient's medical record at [facility] ([facility]) and Care Everywhere (if available), including relevant provider notes, laboratory work, and imaging.     Jenny Landrum, NAOMI, JEANNIE, AGNP-C  [facility]     -------------------------------------------------    Subjective:    Chief complaint:   Chief Complaint   Patient presents with    Pain       Interval history:  Radha Beckwith is a 47 year old female last seen on 9/7/23.  They are a patient of mine seen in follow up.     Recommendations/plan at the last visit included:  Pain Physical Therapy:  YES   She is working with Meme Peacock PT, though she noted at prior visit therapy has been somewhat limited due to hypersensitivity to touch.  Recommend continue working with pain PT, will reassess for benefit at follow-up.     Pain Psychologist to address relaxation, behavioral change, coping style, and other factors important to improvement.  NO     Diagnostic Studies:  Reviewed cervical CT from yesterday - mild discogenic pathology at C4-5, otherwise unremarkable.      Medication Management:   Start gabapentin 300 mg at bedtime and titrate to goal dose of 300 mg three times daily, per instructions on prescription bottle. Monitor for changes in baseline pain levels, intensity of fluctuations, and improvements in sensitivity to light touch. Will consider dosage increase at follow up, pending outcome with initial dosing.   Continue 20 mg twice daily.  She reports small improvement after starting baclofen. Will  "plan to continue current dose for now.   Flexeril - continue 5-10 mg twice daily as needed. She takes in morning and afternoon, appreciates benefit for significant myofascial tightness.   Allow 4-6 hours in between all muscle relaxant doses, avoid concurrent alcohol use.      Potential procedures:   She had bilateral SI joint injections with Dr. Reynaga on 5/16/2023.  She reports significant benefit for a month or so, then noticed pain starting to return, though still appreciated some benefit as of last visit. May repeat every 3+ months.   Consider C7-T1 epidural steroid injection in future, pending outcome with gabapentin.      Other Orders/Referrals:   Recommend using TENS unit a few times throughout the day as needed, particularly when engaging in activities that may increase pain.      Follow up with JEANNIE Schroeder CNP on 10/4/23 at 1 pm for in person visit.     Since her last visit, Radha SHILPA Beckwith reports:  -She has been struggling with pain since last visit.  -She had ED visit prior to and following last visit.   -She reports second ED visit on 9/12/23 was a horrible experience due to pain and treatment received.   -She continues to have neck and headache pain.  -She has wrist pain, particularly with typing, and extends into hand.   -She had EMG done since last visit that was negative for CTS.   -She is very frustrated without having answer to why she is having significantly increased pain.   -She is having posterior neck pain with radiation into posterior head.   -She reports having blurred vision, headache pain alternatives sides.   -She states the side of her head \"feels funky,\" somewhat similar to Bell's palsy she had in the past.   -She was started on gabapentin 300 mg TID at last visit, feels like pain has improved slightly, particularly when she is at rest.   -She does notice some daytime sedation since starting gabapentin but otherwise no side effects.   -She is open to increasing gabapentin " "dosage.   -She is also potentially interested in medical cannabis.       Pain Information:   Pain rating: averages 8/10 on a 0-10 scale.      Interval history from last visit on 9/7/23:  -her pain is worse than it was at last visit.   -She reports neck pain has worsened, also having \"new\" pain in mid back that is constant.   -She does not notice any change in pain levels with bending/twisting.   -She had ED visit yesterday, largely unremarkable workup.   -She reports neck pain has been worsening since July when she saw pain PT.   -She continues to work with pain PT with [facility] Peacock [facility], last visit 9/5/23.   -She states she was doing some of the PT exercises wrong and was finally corrected at this last pain PT visit.   -She reports she went to chiropractor last week, had massage and adjustment, no change in pain levels.   -She is having pain radiating into BUE,   -Cervical CT yesterday demonstrated mild C4-5 discogenic pathology.  -She reports having ringing in ears since June.   -She states she is keeping diary of pain symptoms.   -She is open to trial gabapentin.   -She tried Lyrica in the past that caused weird dreams.   Pain Information:              Pain rating: averages 8/10 on a 0-10 scale.        Interval history from last visit on 7/13/23:  -her pain is about the same as it was at last visit in neck, low back is improved since SI joint to an extent.   -She was referred to pain PT at last visit, has been seeing Meme Peacock PT.   -She states she is \"extremely sensitive,\" so touching is limited during visits.   -She is doing some neck stretching.   -She has not appreciated profound difference from pain PT yet.   -She had SI joint injection with Dr. Reynaga on 5/16/23.  -She reports substantial improvement in pain for about a month, then started to have some pain symptoms return.   -She continued to appreciate some benefit from injection for a while longer, less intensity of pain.   -She notes she had migraines " x 2 weeks, which she wonders if is attributed to stress of having her son home.   -She also had teeth removal.   -She is taking baclofen 10 mg BID  -She has not appreciated much improvement in neck and low back myofascial pain.   -She continues to take flexeril as well, notes this is helpful when she is very stiff in morning.   -She is open to baclofen increase.   -She understands she needs to space out muscle relaxants doses by 4 hours.   -She is using TENS unit a little bit, will be taking to pain PT.   Pain Information:              Pain rating: averages 7/10 on a 0-10 scale.           HPI from initial visit with me on 4/26/23:  Radha Beckwith is a 47 year old female presents with a chief complaint of neck pain/upper back and radicular low back pain.      The pain has been present for 1.5-2 years current pain episode.    The pain is Severe Pain (6) in severity.    The pain is described as dull ache in mid back, cramping, sharp, pressure, throbbing, burning in low back, radicular pain into hips, legs, butt, stops above knee.   The pain is alleviated by position changes, massage/heat device, exercise.    It is exacerbated by prolonged sitting, pain is constant but fluctuates with activity, prolonged standing and walking.    Modalities that have been utilized in the past which were helpful include PT.    Things that were not helpful, but tried ,include back brace, LESI x 1, TPI (?), TENS unit (OTC from [employer]).    The patient has never tried pain PT, SI joint/facet.  The patient otherwise denies bowel or bladder incontinence, parasthesias, weakness, saddle anesthesia, unintentional weight loss, or fever/chills/sweats.   Radha Beckwith has been seen at a pain clinic in the past.  She sees addiction med in [place], [facility] with Dr. Ivory.   -She is not currently working due to pain, off work for 1.5 years.   -She looked into disability but was advised by  that they would not take on her case.    -She had L5-S1 JASE 7/2022 with Dr. Ivory in [place].   -She saw neurosurgery in the past as well in 2021.   -She saw pain provider through [facility], did not care for provider.   -She has 1 kid, 25 year old boy, [employer] and .            Current Pain Treatments:     Medications:               Suboxone 2-0.5 mg daily (weaning off)              Duloxetine 30 mg BID              Ropinirole 1 mg at bedtime               Cyclobenzaprine 5-10 mg BID PRN               Baclofen 20 mg BID     2. Other therapies:               TENS              Pain PT        Current MME: N/A     Review of [place] Prescription Monitoring Program (): No concern for abuse or misuse of controlled medications based on this report. Reviewed - appears appropriate.      Past pain treatments:  SI joint injection 5/16/23 -significant benefit x1 month, then diminishing efficacy      Medications:  Current Outpatient Medications   Medication Sig Dispense Refill    acetaminophen (TYLENOL) 500 MG tablet Take 1,000 mg by mouth every 6 hours as needed for mild pain      ALPRAZolam (XANAX) 0.5 MG tablet TAKE 1 TABLET (0.5 MG) BY MOUTH TWO TIMES A DAY 60 tablet 0    baclofen (LIORESAL) 20 MG tablet TAKE 1 TABLET (20 MG) BY MOUTH 2 TIMES DAILY 60 tablet 0    bisacodyl (DULCOLAX) 5 MG EC tablet Take 1 tablet (5 mg) by mouth every other day Take every other day. If no bowel movement for 2 or more days, take 2 tablets of bisacodyl (10 mg) 30 tablet 3    buprenorphine HCl-naloxone HCl (SUBOXONE) 2-0.5 MG per film Place 0.5 Film under the tongue daily       cetirizine (ZYRTEC) 10 MG tablet TAKE ONE TABLET BY MOUTH EVERY MORNING (Patient taking differently: Take 10 mg by mouth daily) 90 tablet 3    cyclobenzaprine (FLEXERIL) 5 MG tablet TAKE ONE TO TWO TABLETS BY MOUTH THREE TIMES A DAY AS NEEDED FOR MUSCLE SPASMS 90 tablet 5    DULoxetine (CYMBALTA) 30 MG capsule Take 1 capsule (30 mg) by mouth 2 times daily 180 capsule 0    famotidine (PEPCID)  40 MG tablet TAKE 1 TABLET (40 MG) BY MOUTH 2 TIMES DAILY 60 tablet 1    gabapentin (NEURONTIN) 300 MG capsule Current dose 300 mg TID. Increase by 300 mg every 3 days until goal dose of 600 mg TID achieved. 90 capsule 1    lactulose (CHRONULAC) 10 GM/15ML solution Take 15 mLs (10 g) by mouth 3 times daily as needed for constipation (as needed for severe constipaiton, no BM for more than 3 days and feeling constipated) 300 mL 3    lidocaine (LIDODERM) 5 % patch Place 1 patch onto the skin every 24 hours To prevent lidocaine toxicity, patient should be patch free for 12 hrs daily. 30 patch 1    ondansetron (ZOFRAN ODT) 4 MG ODT tab DISSOLVE ONE TABLET ON TONGUE EVERY SIX HOURS AS NEEDED FOR NAUSEA 20 tablet 1    pantoprazole (PROTONIX) 40 MG EC tablet Take 1 tablet (40 mg) by mouth 2 times daily (before meals) 60 tablet 5    polyethylene glycol (MIRALAX) 17 GM/Dose powder Take 17 g by mouth daily 578 g 3    rOPINIRole (REQUIP) 1 MG tablet TAKE ONE TABLET BY MOUTH EVERY NIGHT AT BEDTIME 90 tablet 0    simvastatin (ZOCOR) 10 MG tablet TAKE ONE TABLET BY MOUTH EVERY NIGHT AT BEDTIME 30 tablet 0    traZODone (DESYREL) 50 MG tablet Take 1-2 tablets ( mg) by mouth nightly as needed for sleep 120 tablet 1    zolpidem (AMBIEN) 5 MG tablet TAKE ONE TABLET (5 MG) BY MOUTH NIGHTLY AS NEEDED FOR SLEEP 30 tablet 0    predniSONE (DELTASONE) 20 MG tablet Take two tablets (= 40mg) each day for 5 (five) days 10 tablet 0       Medical History: any changes in medical history since they were last seen? No      Objective:    Physical Exam:  not currently breastfeeding.  GENERAL: Healthy, alert and no distress  EYES: Eyes grossly normal to inspection.  No discharge or erythema, or obvious scleral/conjunctival abnormalities.  RESP: No audible wheeze, cough, or visible cyanosis.  No visible retractions or increased work of breathing.    SKIN: Visible skin clear. No significant rash, abnormal pigmentation or lesions.  NEURO: Cranial  nerves grossly intact.  Mentation and speech appropriate for age.  PSYCH: Mentation appears normal, affect normal/bright, judgement and insight intact, normal speech and appearance well-groomed.    Diagnostic Tests/Imaging/Labs:  MRI OF THE CERVICAL SPINE WITHOUT CONTRAST 9/26/2023 1:38 PM     COMPARISON: CT cervical spine 9/7/2023.     HISTORY: Neck pain.     TECHNIQUE: Multiplanar, multisequence MRI images of the cervical spine  were acquired without intravenous contrast.     FINDINGS: There is normal alignment of the cervical vertebrae.  Vertebral body heights of the cervical spine are normal. Marrow signal  throughout the cervical vertebrae is within normal limits. There is no  evidence for fracture or pathologic bony lesion of the cervical spine.        There is loss of disc height, disc desiccation and posterior disc  bulging to varying degrees at all levels of the cervical spine with  exception of the normal appearing C2-C3 and C7-T1 discs.      The cervical spinal cord is normal in contour. There is no abnormal  signal in the cervical spinal cord. There is no evidence for  intrathecal abnormality.     Level by level:     C2-C3: Normal disc height and contour. Normal facets. No spinal canal  stenosis. No neural foraminal stenosis on either side.      C3-C4: There is facet arthropathy bilaterally, uncinate hypertrophy  bilaterally and a posterior broad-based disc-osteophyte complex. No  spinal canal stenosis. No foraminal stenosis on either side.     C4-C5: There is facet arthropathy bilaterally, uncinate hypertrophy  bilaterally and a posterior broad-based disc-osteophyte complex. No  spinal canal stenosis. Moderate right foraminal stenosis. Mild left  foraminal narrowing.     C5-C6: There is facet arthropathy bilaterally, uncinate hypertrophy  bilaterally and a posterior broad-based disc-osteophyte complex. No  spinal canal stenosis. No left foraminal stenosis. Mild right  foraminal narrowing.     C6-C7:  There is facet arthropathy bilaterally, uncinate hypertrophy  bilaterally and a posterior broad-based disc-osteophyte complex. No  spinal canal stenosis. No foraminal stenosis on either side.     C7-T1: Normal disc height and contour. Normal facets. No spinal canal  stenosis. No neural foraminal stenosis on either side.                                                                       IMPRESSION:  1. Diffuse degenerative change of the cervical spine as detailed  above.  2. No spinal canal stenosis of the cervical spine.  3. Moderate neural foraminal stenosis on the right at C4-C5. No other  high-grade neural foraminal narrowing of the cervical spine.      MRI OF THE LUMBAR SPINE WITHOUT CONTRAST 9/26/2023 1:38 PM      COMPARISON: Lumbar spine MRI 3/8/2022.     HISTORY: Lumbar pain.     TECHNIQUE: Multiplanar, multisequence MRI images of the lumbar spine  were acquired without IV contrast.     FINDINGS: There are five lumbar-type vertebrae for the purposes of  this dictation.      There is normal alignment of the lumbar vertebrae. Vertebral body  heights of the lumbar spine are normal. Marrow signal throughout the  lumbar vertebrae is within normal limits. There is no evidence for  fracture or pathologic bony lesion of the lumbar spine.      There is loss of disc height, disc desiccation and posterior disc  bulging/herniation to varying degrees at all levels of the lumbar  spine with the exception of the normal-appearing L1-L2 and L2-L3  discs.      The tip of the conus medullaris is at the L1-L2 level which is within  normal limits. There is no evidence for intrathecal abnormality.      Level by level:      T12-L1: Normal disc height and signal. No herniation. Normal facet  joints. No spinal canal stenosis. No foraminal stenosis on either  side.      L1-L2: Normal disc height and signal. No herniation. Normal facet  joints. No spinal canal stenosis. No foraminal stenosis on either  side.      L2-L3: Normal disc  height and signal. No herniation. Normal facet  joints. No spinal canal stenosis. No foraminal stenosis on either  side.      L3-L4: Loss of disc height, disc desiccation and mild circumferential  disc bulging. No herniation. Moderate facet arthropathy bilaterally.  No spinal canal stenosis. No foraminal stenosis on either side.     L4-L5: Loss of disc height, disc desiccation and circumferential disc  bulging with a superimposed left lateral disc herniation (protrusion).  Mild facet arthropathy bilaterally. No spinal canal stenosis. Mild  left foraminal narrowing. No right foraminal stenosis.     L5-S1: Loss of disc height, disc desiccation and circumferential disc  bulging with a superimposed left paracentral disc herniation  (extrusion). Mild facet arthropathy bilaterally. No spinal canal  stenosis. Mild to moderate left foraminal narrowing. No right  foraminal stenosis.                                                                      IMPRESSION:  1. Degenerative change of the lumbar spine as detailed above without  appreciable change from the comparison study.  2. Left lateral disc herniations at L4-L5 and L5-S1 again noted.  3. Mild to moderate left foraminal narrowing at L5-S1.  4. No other significant spinal canal or neural foraminal narrowing of  the lumbar spine.MRI OF THE THORACIC SPINE WITHOUT CONTRAST  9/26/2023 1:38 PM      COMPARISON: CT thoracic spine 9/7/2023.     HISTORY: Acute bilateral thoracic back pain.     TECHNIQUE: Multiplanar, multisequence MRI images of the thoracic spine  were acquired without gadolinium IV contrast.     FINDINGS:  There is normal alignment of the thoracic vertebrae.  Vertebral body heights normal. There is Modic 2 degenerative marrow  signal change in the opposing endplates at T9-T10. Marrow signal  otherwise normal. No evidence for fracture or pathologic bone lesion  of the thoracic spine.     The thoracic intervertebral discs are normal in height, contour  and  signal intensity.     The thoracic spinal cord is normal in contour and signal intensity.     No spinal canal or neural foraminal stenosis at any level of the  thoracic spine.                                                                      IMPRESSION:  1. Modic 2 degenerative marrow signal change in the opposing endplates  at T9-T10.  2. Otherwise, normal thoracic spine MRI.      BILLING TIME DOCUMENTATION:   The total TIME spent on this patient on the date of the encounter/appointment was 45 minutes.      TOTAL TIME includes:   Time spent preparing to see the patient (reviewing records and tests)   Time spent face to face (or over the phone) with the patient   Time spent ordering tests, medications, procedures and referrals   Time spent Referring and communicating with other healthcare professionals   Time spent documenting clinical information in Epic

## 2023-10-04 NOTE — TELEPHONE ENCOUNTER
Linette SRIVASTAVA Pain Nurse (supporting Jenny Landrum, JEANNIE CNP)15 hours ago (5:17 PM)       Is it possible to do a virtual visit tomorrow? I know it s not ideal but the transmission is going out in my car and I m afraid to drive it from [place] and back.   FYI, I had CT scans in the ER at the beginning of last month and MRI s on September 26th. Apparently there were some changes from my last MRI s. My PA had asked the Neurosurgeon to go over the results with me on Sept 28th and he said no surgery was needed at this time. He suggested medical marajuna and told me to talk to you about it.  I also had my EMG follow up and I do not have Carpal Tunnel.   (I can tell you more when we talk). Especially the incorrect information on some of the notes in MyChart.   I figured I d let you know what s happened since our last visit.  Regards,  Linette    Patient requesting 10/4 1300 appointment be made virtual.     Routing request to provider to review.     Reema Gaffney, CONG  [facility]  [phone]

## 2023-10-05 ENCOUNTER — TELEPHONE (OUTPATIENT)
Dept: PALLIATIVE MEDICINE | Facility: CLINIC | Age: 48
End: 2023-10-05
Payer: COMMERCIAL

## 2023-10-05 LAB
PATH REPORT.COMMENTS IMP SPEC: NORMAL
PATH REPORT.COMMENTS IMP SPEC: NORMAL
PATH REPORT.FINAL DX SPEC: NORMAL
PATH REPORT.GROSS SPEC: NORMAL
PATH REPORT.MICROSCOPIC SPEC OTHER STN: NORMAL
PATH REPORT.RELEVANT HX SPEC: NORMAL
PHOTO IMAGE: NORMAL

## 2023-10-05 NOTE — COMMUNITY RESOURCES LIST (ENGLISH)
10/05/2023   [facility]  N/A  For questions about this resource list or additional care needs, please contact your primary care clinic or care manager.  Phone: [phone]   Email: N/A   Address: [address] [place] [id number]   Hours: N/A        Transportation       Free or low-cost transportation  1  [employer] Distance: 15.59 miles      In-Person   [address] [place] [address]  Language: English  Hours: Mon - Fri 6:00 AM - 6:00 PM  Fees: Insurance, Self Pay   Phone: (542) 710-7471 Email: info@[employer] Website: http://www.[employer]     Transportation to medical appointments  2  [employer] Distance: 15.59 miles      In-Person   [address] [place] [address]  Language: English  Hours: Mon - Fri 6:00 AM - 6:00 PM  Fees: Insurance, Self Pay   Phone: (951) 428-6705 Email: info@[employer] Website: http://www.[employer]     3  [employer] Distance: 21.31 miles      In-Person   [address] [place] [address]  Language: English  Hours: Mon - Fri 4:00 AM - 6:00 PM  Fees: Insurance, Self Pay   Phone: (801) 311-8058 Email: [email or web address]          Important Numbers & Websites       Emergency Services   911  [facility]   311  Poison Control   (333) 851-7970  Suicide Prevention Lifeline   (995) 898-1027 (TALK)  Child Abuse Hotline   (746) 346-8476 (4-A-Child)  Sexual Assault Hotline   (644) 637-5021 (HOPE)  National Runaway Safeline   (167) 112-3518 (RUNAWAY)  All-Options Talkline   (758) 460-3034  Substance Abuse Referral   (155) 486-5699 (HELP)

## 2023-10-06 ENCOUNTER — OFFICE VISIT (OUTPATIENT)
Dept: FAMILY MEDICINE | Facility: CLINIC | Age: 48
End: 2023-10-06
Payer: COMMERCIAL

## 2023-10-06 VITALS
DIASTOLIC BLOOD PRESSURE: 78 MMHG | BODY MASS INDEX: 30.01 KG/M2 | HEART RATE: 113 BPM | OXYGEN SATURATION: 97 % | RESPIRATION RATE: 18 BRPM | WEIGHT: 175.8 LBS | SYSTOLIC BLOOD PRESSURE: 100 MMHG | HEIGHT: 64 IN | TEMPERATURE: 97.7 F

## 2023-10-06 DIAGNOSIS — G89.29 CHRONIC BILATERAL LOW BACK PAIN WITHOUT SCIATICA: ICD-10-CM

## 2023-10-06 DIAGNOSIS — F41.9 ANXIETY: ICD-10-CM

## 2023-10-06 DIAGNOSIS — G89.29 CHRONIC NECK PAIN: ICD-10-CM

## 2023-10-06 DIAGNOSIS — E87.6 HYPOKALEMIA: ICD-10-CM

## 2023-10-06 DIAGNOSIS — E78.5 HYPERLIPIDEMIA LDL GOAL <100: ICD-10-CM

## 2023-10-06 DIAGNOSIS — M54.50 CHRONIC MIDLINE LOW BACK PAIN WITHOUT SCIATICA: ICD-10-CM

## 2023-10-06 DIAGNOSIS — M54.2 CHRONIC NECK PAIN: ICD-10-CM

## 2023-10-06 DIAGNOSIS — K21.9 GASTROESOPHAGEAL REFLUX DISEASE WITHOUT ESOPHAGITIS: ICD-10-CM

## 2023-10-06 DIAGNOSIS — G89.29 CHRONIC MIDLINE LOW BACK PAIN WITHOUT SCIATICA: ICD-10-CM

## 2023-10-06 DIAGNOSIS — Z01.818 PREOP GENERAL PHYSICAL EXAM: Primary | ICD-10-CM

## 2023-10-06 DIAGNOSIS — M54.50 CHRONIC BILATERAL LOW BACK PAIN WITHOUT SCIATICA: ICD-10-CM

## 2023-10-06 DIAGNOSIS — F11.21 OPIOID DEPENDENCE IN REMISSION (H): ICD-10-CM

## 2023-10-06 DIAGNOSIS — F33.1 MODERATE EPISODE OF RECURRENT MAJOR DEPRESSIVE DISORDER (H): ICD-10-CM

## 2023-10-06 PROBLEM — F32.9 MAJOR DEPRESSION: Status: RESOLVED | Noted: 2019-06-03 | Resolved: 2023-10-06

## 2023-10-06 PROCEDURE — 99214 OFFICE O/P EST MOD 30 MIN: CPT | Mod: 25 | Performed by: NURSE PRACTITIONER

## 2023-10-06 PROCEDURE — 90471 IMMUNIZATION ADMIN: CPT | Performed by: NURSE PRACTITIONER

## 2023-10-06 PROCEDURE — 90686 IIV4 VACC NO PRSV 0.5 ML IM: CPT | Performed by: NURSE PRACTITIONER

## 2023-10-06 ASSESSMENT — PAIN SCALES - GENERAL: PAINLEVEL: EXTREME PAIN (8)

## 2023-10-06 NOTE — COMMUNITY RESOURCES LIST (ENGLISH)
10/06/2023   [facility]  N[facility]A  For questions about this resource list or additional care needs, please contact your primary care clinic or care manager.  Phone: [phone]   Email: N/A   Address: [address] [place] [id number]   Hours: N/A        Transportation       Free or low-cost transportation  1  [employer] Distance: 15.59 miles      In-Person   [address] [place] [address]  Language: English  Hours: Mon - Fri 6:00 AM - 6:00 PM  Fees: Insurance, Self Pay   Phone: (770) 175-2270 Email: info@[employer] Website: http://www.[employer]     Transportation to medical appointments  2  [employer] Distance: 15.59 miles      In-Person   [address] [place] [address]  Language: English  Hours: Mon - Fri 6:00 AM - 6:00 PM  Fees: Insurance, Self Pay   Phone: (514) 303-4428 Email: info@[employer] Website: http://www.[employer]     3  [employer] Distance: 21.31 miles      In-Person   [address] [place] [address]  Language: English  Hours: Mon - Fri 4:00 AM - 6:00 PM  Fees: Insurance, Self Pay   Phone: (889) 442-9517 Email: ragini@[email or web address]          Important Numbers & Websites       Emergency Services   911  [facility]   311  Poison Control   (718) 906-1490  Suicide Prevention Lifeline   (889) 887-5687 (TALK)  Child Abuse Hotline   (815) 953-9791 (4-A-Child)  Sexual Assault Hotline   (983) 532-8828 (HOPE)  National Runaway Safeline   (297) 420-1713 (RUNAWAY)  All-Options Talkline   (385) 469-7568  Substance Abuse Referral   (596) 397-5565 (HELP)

## 2023-10-06 NOTE — PROGRESS NOTES
Ventura Sue is a 47 year old, presenting for the following health issues:  History of Present Illness (S S I questions, vision changes, )      10/6/2023     7:52 AM   Additional Questions   Roomed by Tiffany       History of Present Illness       Back Pain:  She presents for follow up of back pain. Patient's back pain is a chronic problem.  Location of back pain:  Right lower back, left lower back, right middle of back, left middle of back, right upper back, left upper back, right side of neck, left side of neck, right shoulder, left shoulder, right buttock, left buttock, right hip, left hip, right side of waist, left side of waist and other  Description of back pain: burning, cramping, dull ache, fullness, gnawing, sharp, shooting, stabbing and other  Back pain spreads: right buttocks, left buttocks, right thigh, left thigh, right knee, right foot, right shoulder, left shoulder, right side of neck and left side of neck    Since patient first noticed back pain, pain is: always present, but gets better and worse  Does back pain interfere with her job:  Not applicable       Mental Health Follow-up:  Patient presents to follow-up on Depression & Anxiety.Patient's depression since last visit has been:  No change  The patient is not having other symptoms associated with depression.  Patient's anxiety since last visit has been:  Worse  The patient is having other symptoms associated with anxiety.  Any significant life events: relationship concerns, job concerns, financial concerns, grief or loss and health concerns  Patient is feeling anxious or having panic attacks.  Patient has no concerns about alcohol or drug use.    Hyperlipidemia:  She presents for follow up of hyperlipidemia.   She is taking medication to lower cholesterol. She is not having myalgia or other side effects to statin medications.    Headaches:   Since the patient's last clinic visit, headaches are: worsened  The patient is getting  "headaches:  Nearly every day  She is not able to do normal daily activities when she has a migraine.  The patient is taking the following rescue/relief medications:  Tylenol   Patient states \"The relief is inconsistent\" from the rescue/relief medications.   The patient is taking the following medications to prevent migraines:  No medications to prevent migraines  In the past 4 weeks, the patient has gone to an Urgent Care or Emergency Room 1 time times due to headaches.    Reason for visit:  To talk about SSI disability, back, neck, wrist pain, pain in other places throughout my body, forgetfulness (very short term memory)    She eats 0-1 servings of fruits and vegetables daily.She consumes 0 sweetened beverage(s) daily.She exercises with enough effort to increase her heart rate 9 or less minutes per day.  She exercises with enough effort to increase her heart rate 3 or less days per week.   She is taking medications regularly.       Seeing Neurosurgery- goes to [facility]- for low back injection/ pain specialist.  Getting cercival injection here on the 20th.      Has not been able to work for two years.  Drives self to appointments.  Does own self care.  Hard time with chores- vacuuming.  Is seen for neck/ back pain with pain management.    "

## 2023-10-06 NOTE — PATIENT INSTRUCTIONS
Preparing for Your Surgery  Getting started  A nurse will call you to review your health history and instructions. They will give you an arrival time based on your scheduled surgery time. Please be ready to share:  Your doctor's clinic name and phone number  Your medical, surgical, and anesthesia history  A list of allergies and sensitivities  A list of medicines, including herbal treatments and over-the-counter drugs  Whether the patient has a legal guardian (ask how to send us the papers in advance)  Please tell us if you're pregnant--or if there's any chance you might be pregnant. Some surgeries may injure a fetus (unborn baby), so they require a pregnancy test. Surgeries that are safe for a fetus don't always need a test, and you can choose whether to have one.   If you have a child who's having surgery, please ask for a copy of Preparing for Your Child's Surgery.    Preparing for surgery  Within 10 to 30 days of surgery: Have a pre-op exam (sometimes called an H&P, or History and Physical). This can be done at a clinic or pre-operative center.  If you're having a , you may not need this exam. Talk to your care team.  At your pre-op exam, talk to your care team about all medicines you take. If you need to stop any medicines before surgery, ask when to start taking them again.  We do this for your safety. Many medicines can make you bleed too much during surgery. Some change how well surgery (anesthesia) drugs work.  Call your insurance company to let them know you're having surgery. (If you don't have insurance, call [phone].)  Call your clinic if there's any change in your health. This includes signs of a cold or flu (sore throat, runny nose, cough, rash, fever). It also includes a scrape or scratch near the surgery site.  If you have questions on the day of surgery, call your hospital or surgery center.  Eating and drinking guidelines  For your safety: Unless your surgeon tells you otherwise,  follow the guidelines below.  Eat and drink as usual until 8 hours before you arrive for surgery. After that, no food or milk.  Drink clear liquids until 2 hours before you arrive. These are liquids you can see through, like water, Gatorade, and Propel Water. They also include plain black coffee and tea (no cream or milk), candy, and breath mints. You can spit out gum when you arrive.  If you drink alcohol: Stop drinking it the night before surgery.  If your care team tells you to take medicine on the morning of surgery, it's okay to take it with a sip of water.  Preventing infection  Shower or bathe the night before and morning of your surgery. Follow the instructions your clinic gave you. (If no instructions, use regular soap.)  Don't shave or clip hair near your surgery site. We'll remove the hair if needed.  Don't smoke or vape the morning of surgery. You may chew nicotine gum up to 2 hours before surgery. A nicotine patch is okay.  Note: Some surgeries require you to completely quit smoking and nicotine. Check with your surgeon.  Your care team will make every effort to keep you safe from infection. We will:  Clean our hands often with soap and water (or an alcohol-based hand rub).  Clean the skin at your surgery site with a special soap that kills germs.  Give you a special gown to keep you warm. (Cold raises the risk of infection.)  Wear special hair covers, masks, gowns and gloves during surgery.  Give antibiotic medicine, if prescribed. Not all surgeries need antibiotics.  What to bring on the day of surgery  Photo ID and insurance card  Copy of your health care directive, if you have one  Glasses and hearing aids (bring cases)  You can't wear contacts during surgery  Inhaler and eye drops, if you use them (tell us about these when you arrive)  CPAP machine or breathing device, if you use them  A few personal items, if spending the night  If you have . . .  A pacemaker, ICD (cardiac defibrillator) or other  implant: Bring the ID card.  An implanted stimulator: Bring the remote control.  A legal guardian: Bring a copy of the certified (court-stamped) guardianship papers.  Please remove any jewelry, including body piercings. Leave jewelry and other valuables at home.  If you're going home the day of surgery  You must have a responsible adult drive you home. They should stay with you overnight as well.  If you don't have someone to stay with you, and you aren't safe to go home alone, we may keep you overnight. Insurance often won't pay for this.  After surgery  If it's hard to control your pain or you need more pain medicine, please call your surgeon's office.  Questions?   If you have any questions for your care team, list them here: _________________________________________________________________________________________________________________________________________________________________________ ____________________________________ ____________________________________ ____________________________________  For informational purposes only. Not to replace the advice of your health care provider. Copyright   2003, 2019 [employer] [facility]. All rights reserved. Clinically reviewed by Isabelle Luu MD. [employer] 113140 - REV 12/22.    How to Take Your Medication Before Surgery  - Take all of your medications before surgery as usual

## 2023-10-06 NOTE — PROGRESS NOTES
[address] [address]  Phone: [phone]  Fax: [phone]  Primary Provider: Lora Gonzales  Pre-op Performing Provider: LORA GONZALES      PREOPERATIVE EVALUATION:  Today's date: 10/6/2023    Linette is a 47 year old female who presents for a preoperative evaluation.      10/6/2023     7:52 AM   Additional Questions   Roomed by Tiffany       Surgical Information:  Surgery/Procedure: cervical injection  Surgery Location: [facility]  Surgeon: Dr. Ivory  Surgery Date: 10/20/23  Time of Surgery: am  Where patient plans to recover: At home with family  Fax number for surgical facility: Note does not need to be faxed, will be available electronically in Epic.    Assessment & Plan     The proposed surgical procedure is considered LOW risk.    Preop general physical exam  pre-op physical to evaluate for anesthesia and its jayleen-operative risks.     Opioid dependence in remission (H)  On suboxone and followed by pain management    Moderate episode of recurrent major depressive disorder (H)/ Anxiety/ Chronic bilateral low back pain without sciatica/ Chronic midline low back pain without sciatica/ Chronic neck pain:   Patient seeing counseling, Neurosurgery, pain management.  Has not worked in over two years due to her pain.  She is interested in disability and will be checking on this- consider pending paperwork    Gastroesophageal reflux disease without esophagitis  Controlled on PPI    Hyperlipidemia LDL goal <100  Stable- on statin    Hypokalemia  On replacement      Risks and Recommendations:  The patient has the following additional risks and recommendations for perioperative complications:  Social and Substance:    - High tolerance to opioid analgesics due to opiod dependance on suboxone   - Patient is taking medications for chronic pain    Antiplatelet or Anticoagulation Medication Instructions:   - Patient is on no antiplatelet or  anticoagulation medications.    Additional Medication Instructions:   - Suboxone: Continue without modification. Notify Suboxone prescriber of upcoming surgery, patient will need an individualized perioperative plan.     RECOMMENDATION:  APPROVAL GIVEN to proceed with proposed procedure, without further diagnostic evaluation.    35 minutes spent by me on the date of the encounter doing chart review, review of test results, interpretation of tests, patient visit, and documentation       Subjective       HPI related to upcoming procedure: cervical pain          10/6/23      Preop Questions   1. Have you ever had a heart attack or stroke? No   2. Have you ever had surgery on your heart or blood vessels, such as a stent placement, a coronary artery bypass, or surgery on an artery in your head, neck, heart, or legs? No   3. Do you have chest pain with activity? No   4. Do you have a history of  heart failure? No   5. Do you currently have a cold, bronchitis or symptoms of other infection? No   6. Do you have a cough, shortness of breath, or wheezing? No   7. Do you or anyone in your family have previous history of blood clots? No   8. Do you or does anyone in your family have a serious bleeding problem such as prolonged bleeding following surgeries or cuts? No   9. Have you ever had problems with anemia or been told to take iron pills? No   10. Have you had any abnormal blood loss such as black, tarry or bloody stools, or abnormal vaginal bleeding? No   11. Have you ever had a blood transfusion? No   12. Are you willing to have a blood transfusion if it is medically needed before, during, or after your surgery? Yes   13. Have you or any of your relatives ever had problems with anesthesia? No   14. Do you have sleep apnea, excessive snoring or daytime drowsiness? YES - daytime drowsiness from medications   14a. Do you have a CPAP machine? No   15. Do you have any artifical heart valves or other implanted medical devices  like a pacemaker, defibrillator, or continuous glucose monitor? No   16. Do you have artificial joints? No   17. Are you allergic to latex? No   18. Is there any chance that you may be pregnant? No     Health Care Directive:  Patient does not have a Health Care Directive or Living Will: Discussed advance care planning with patient; however, patient declined at this time.    Preoperative Review of :   reviewed - controlled substances reflected in medication list.        Review of Systems  CONSTITUTIONAL: NEGATIVE for fever, chills, change in weight  INTEGUMENTARY/SKIN: NEGATIVE for worrisome rashes, moles or lesions  EYES: NEGATIVE for vision changes or irritation  ENT/MOUTH: NEGATIVE for ear, mouth and throat problems  RESP: NEGATIVE for significant cough or SOB  CV: NEGATIVE for chest pain, palpitations or peripheral edema  GI: NEGATIVE for nausea, abdominal pain, heartburn, or change in bowel habits  : NEGATIVE for frequency, dysuria, or hematuria  MUSCULOSKELETAL: NEGATIVE for significant arthralgias or myalgia  NEURO: NEGATIVE for weakness, dizziness or paresthesias  ENDOCRINE: NEGATIVE for temperature intolerance, skin/hair changes  HEME: NEGATIVE for bleeding problems  PSYCHIATRIC: NEGATIVE for changes in mood or affect    Patient Active Problem List    Diagnosis Date Noted    Chronic midline low back pain without sciatica 06/15/2023     Priority: Medium    Chronic neck pain 06/15/2023     Priority: Medium    Moderate episode of recurrent major depressive disorder (H) 06/29/2022     Priority: Medium    Moderate major depression (H) 06/03/2019     Priority: Medium    Supraventricular tachycardia 06/03/2019     Priority: Medium    Psychophysiological insomnia 12/30/2017     Priority: Medium    Chronic bilateral low back pain without sciatica 12/30/2017     Priority: Medium    Opioid dependence in remission (H) 08/02/2015     Priority: Medium     On suboxone treatement with Bryant Harkins.   (Noted in 2015).        Anxiety attack 07/31/2015     Priority: Medium    Hypokalemia 06/23/2015     Priority: Medium    Tobacco abuse 06/23/2015     Priority: Medium    Hyperlipidemia LDL goal <100 01/29/2014     Priority: Medium    Anxiety 08/19/2013     Priority: Medium    GERD (gastroesophageal reflux disease) 07/28/2010     Priority: Medium     Takes omeprazole and metoclopramide      Restless legs 07/28/2010     Priority: Medium      Past Medical History:   Diagnosis Date    Abdominal pain, right lower quadrant 03/09/2008    Admit. Discharged 03/10/08    Anxiety attack 07/31/2015    Atypical chest pain 06/23/2015    De Quervain's disease (tenosynovitis)     Dehydration     Gastric ulcer 07/31/2015    GERD (gastroesophageal reflux disease) 07/28/2010    Takes omeprazole and metoclopramide     Ingrowing nail 01/09/2014    Migraines     Opioid dependence in remission (H) 08/02/2015    Other and unspecified ovarian cyst     Papanicolaou smear of cervix with low grade squamous intraepithelial lesion (LGSIL) 07/07/2017     Past Surgical History:   Procedure Laterality Date    BIOPSY CERVICAL, LOCAL EXCISION, SINGLE/MULTIPLE N/A 8/10/2017    Procedure: BIOPSY CERVICAL, LOCAL EXCISION, SINGLE/MULTIPLE;;  Surgeon: Michael Chandler MD;  Location[facility]    COLONOSCOPY N/A 1/6/2023    Procedure: COLONOSCOPY;  Surgeon: Michael Dozier MD;  [facility]    COLPOSCOPY, BIOPSY, COMBINED N/A 8/10/2017    Procedure: COMBINED COLPOSCOPY, BIOPSY;  Colposcopy with Cervical Biopsies and Endometrial Biopsy, Exam with Ultrasound;  Surgeon: Michael Chandler MD;  Location[facility]    ESOPHAGOSCOPY, GASTROSCOPY, DUODENOSCOPY (EGD), COMBINED N/A 4/17/2017    Procedure: COMBINED ESOPHAGOSCOPY, GASTROSCOPY, DUODENOSCOPY (EGD);  Surgeon: Ibrahima Esposito MD;  [facility]    ESOPHAGOSCOPY, GASTROSCOPY, DUODENOSCOPY (EGD), COMBINED N/A 1/6/2023    Procedure: ESOPHAGOGASTRODUODENOSCOPY, WITH BIOPSY;  Surgeon:  Michael Dozier MD;  [facility]    ESOPHAGOSCOPY, GASTROSCOPY, DUODENOSCOPY (EGD), COMBINED N/A 10/3/2023    Procedure: ESOPHAGOGASTRODUODENOSCOPY, WITH BIOPSY;  Surgeon: John Paul Varela MD;  [facility]    EXAM UNDER ANESTHESIA PELVIC N/A 8/10/2017    Procedure: EXAM UNDER ANESTHESIA PELVIC;;  Surgeon: Michael Chandler MD;  [facility]    HYSTERECTOMY      HYSTERECTOMY, PAP NO LONGER INDICATED      INJECT EPIDURAL LUMBAR Bilateral 7/1/2022    Procedure: Lumbar 5-Sacral 1 Transforaminal Epidural Steroid Injection with fluoroscopic guidance and contrast, bilateral;  Surgeon: Trevor Ivory MD;  [facility]    LAPAROSCOPIC CHOLECYSTECTOMY N/A 2/2/2018    Procedure: LAPAROSCOPIC CHOLECYSTECTOMY;  Laparoscopic Cholecystectomy;  Surgeon: Tigre Lowry DO;  [facility]    LAPAROSCOPIC HYSTERECTOMY TOTAL N/A 10/30/2017    Procedure: LAPAROSCOPIC HYSTERECTOMY TOTAL;  LAPAROSCOPIC HYSTERECTOMY TOTAL POSSIBLE SALPINGO-OOPHERECTOMY (BILATERAL);  Surgeon: Michael Chandler MD;  Location: [facility]    [facility] UGI ENDOSCOPY, SIMPLE EXAM  01/07/08     Current Outpatient Medications   Medication Sig Dispense Refill    acetaminophen (TYLENOL) 500 MG tablet Take 1,000 mg by mouth every 6 hours as needed for mild pain      ALPRAZolam (XANAX) 0.5 MG tablet TAKE 1 TABLET (0.5 MG) BY MOUTH TWO TIMES A DAY 60 tablet 0    baclofen (LIORESAL) 20 MG tablet TAKE 1 TABLET (20 MG) BY MOUTH 2 TIMES DAILY 60 tablet 0    bisacodyl (DULCOLAX) 5 MG EC tablet Take 1 tablet (5 mg) by mouth every other day Take every other day. If no bowel movement for 2 or more days, take 2 tablets of bisacodyl (10 mg) 30 tablet 3    buprenorphine HCl-naloxone HCl (SUBOXONE) 2-0.5 MG per film Place 0.5 Film under the tongue daily       cetirizine (ZYRTEC) 10 MG tablet TAKE ONE TABLET BY MOUTH EVERY MORNING (Patient taking differently: Take 10 mg by mouth daily) 90 tablet 3    cyclobenzaprine (FLEXERIL) 5 MG tablet TAKE  ONE TO TWO TABLETS BY MOUTH THREE TIMES A DAY AS NEEDED FOR MUSCLE SPASMS 90 tablet 5    DULoxetine (CYMBALTA) 30 MG capsule Take 1 capsule (30 mg) by mouth 2 times daily 180 capsule 0    famotidine (PEPCID) 40 MG tablet TAKE 1 TABLET (40 MG) BY MOUTH 2 TIMES DAILY 60 tablet 1    gabapentin (NEURONTIN) 300 MG capsule Current dose 300 mg TID. Increase by 300 mg every 3 days until goal dose of 600 mg TID achieved. 90 capsule 1    lactulose (CHRONULAC) 10 GM/15ML solution Take 15 mLs (10 g) by mouth 3 times daily as needed for constipation (as needed for severe constipaiton, no BM for more than 3 days and feeling constipated) 300 mL 3    lidocaine (LIDODERM) 5 % patch Place 1 patch onto the skin every 24 hours To prevent lidocaine toxicity, patient should be patch free for 12 hrs daily. 30 patch 1    ondansetron (ZOFRAN ODT) 4 MG ODT tab DISSOLVE ONE TABLET ON TONGUE EVERY SIX HOURS AS NEEDED FOR NAUSEA 20 tablet 1    pantoprazole (PROTONIX) 40 MG EC tablet Take 1 tablet (40 mg) by mouth 2 times daily (before meals) 60 tablet 5    polyethylene glycol (MIRALAX) 17 GM/Dose powder Take 17 g by mouth daily 578 g 3    predniSONE (DELTASONE) 20 MG tablet Take two tablets (= 40mg) each day for 5 (five) days 10 tablet 0    rOPINIRole (REQUIP) 1 MG tablet TAKE ONE TABLET BY MOUTH EVERY NIGHT AT BEDTIME 90 tablet 0    simvastatin (ZOCOR) 10 MG tablet TAKE ONE TABLET BY MOUTH EVERY NIGHT AT BEDTIME 30 tablet 0    traZODone (DESYREL) 50 MG tablet Take 1-2 tablets ( mg) by mouth nightly as needed for sleep 120 tablet 1    zolpidem (AMBIEN) 5 MG tablet TAKE ONE TABLET (5 MG) BY MOUTH NIGHTLY AS NEEDED FOR SLEEP 30 tablet 0       Allergies   Allergen Reactions    Ergotamine-Caffeine      12-            GI problems-    Seasonal Allergies     Sumatriptan      vomits after giving herself a shot    Compazine Anxiety    Droperidol Anxiety    Nubain [Nalbuphine Hcl] Anxiety    Prochlorperazine Palpitations     Uncontrolled  "movement        Social History     Tobacco Use    Smoking status: Some Days     Packs/day: 0.25     Years: 20.00     Pack years: 5.00     Types: Cigarettes    Smokeless tobacco: Never   Substance Use Topics    Alcohol use: Yes     Comment: occasional drinks; about 5-6 beers/week     Family History   Problem Relation Age of Onset    Depression Mother     Respiratory Mother     Chronic Obstructive Pulmonary Disease Mother     Cerebrovascular Disease Father         Brain anyeurism    Breast Cancer Cousin     Adrenal Disorder Other     Chronic Obstructive Pulmonary Disease Other      History   Drug Use No         Objective     /78 (Cuff Size: Adult Large)   Pulse 113   Temp 97.7  F (36.5  C) (Temporal)   Resp 18   Ht 1.626 m (5' 4\")   Wt 79.7 kg (175 lb 12.8 oz)   LMP  (LMP Unknown)   SpO2 97%   BMI 30.18 kg/m      Physical Exam    GENERAL APPEARANCE: healthy, alert and no distress     EYES: EOMI, PERRL     HENT: ear canals and TM's normal and nose and mouth without ulcers or lesions     NECK: no adenopathy, no asymmetry, masses, or scars and thyroid normal to palpation     RESP: lungs clear to auscultation - no rales, rhonchi or wheezes     CV: regular rates and rhythm, normal S1 S2, no S3 or S4 and no murmur, click or rub     ABDOMEN:  soft, nontender, no HSM or masses and bowel sounds normal     MS: extremities normal- no gross deformities noted, no evidence of inflammation in joints, FROM in all extremities.     SKIN: no suspicious lesions or rashes     NEURO: Normal strength and tone, sensory exam grossly normal, mentation intact and speech normal     PSYCH: mentation appears normal. and affect normal/bright     LYMPHATICS: No cervical adenopathy    Recent Labs   Lab Test 09/13/23  0012 09/06/23  2310   HGB 12.8 12.4    211    138   POTASSIUM 3.5 3.1*   CR 0.68 0.76        Diagnostics:  No labs were ordered during this visit.   No EKG required for low risk surgery (cataract, skin " procedure, breast biopsy, etc).    Revised Cardiac Risk Index (RCRI):  The patient has the following serious cardiovascular risks for perioperative complications:   - No serious cardiac risks = 0 points     RCRI Interpretation: 0 points: Class I (very low risk - 0.4% complication rate)         Signed Electronically by: Gisselle Linn NP  Copy of this evaluation report is provided to requesting physician.

## 2023-10-08 ENCOUNTER — MYC MEDICAL ADVICE (OUTPATIENT)
Dept: FAMILY MEDICINE | Facility: CLINIC | Age: 48
End: 2023-10-08
Payer: COMMERCIAL

## 2023-10-08 DIAGNOSIS — E78.2 MIXED HYPERLIPIDEMIA: ICD-10-CM

## 2023-10-08 DIAGNOSIS — F51.04 PSYCHOPHYSIOLOGICAL INSOMNIA: ICD-10-CM

## 2023-10-09 NOTE — TELEPHONE ENCOUNTER
PRIOR AUTHORIZATION DENIED    Medication: lidocaine (LIDODERM) 5 % patch    Denial Date: 10/9/2023    Denial Rational:                    Appeal Information:    If you would like to appeal, please supply P/A team with a letter of medical necessity with clinical reason.

## 2023-10-09 NOTE — TELEPHONE ENCOUNTER
Central Prior Authorization Team   Phone: [phone]    PA Initiation    Medication: lidocaine (LIDODERM) 5 % patch  Insurance Company: [facility] - Phone [phone] Fax [phone]  Pharmacy Filling the Rx: [address]   Filling Pharmacy Phone: [phone]  Filling Pharmacy Fax:    Start Date: 10/9/2023

## 2023-10-10 ENCOUNTER — VIRTUAL VISIT (OUTPATIENT)
Dept: PSYCHOLOGY | Facility: OTHER | Age: 48
End: 2023-10-10
Payer: COMMERCIAL

## 2023-10-10 DIAGNOSIS — F33.1 MODERATE EPISODE OF RECURRENT MAJOR DEPRESSIVE DISORDER (H): Primary | ICD-10-CM

## 2023-10-10 DIAGNOSIS — F41.1 GENERALIZED ANXIETY DISORDER: ICD-10-CM

## 2023-10-10 PROCEDURE — 90834 PSYTX W PT 45 MINUTES: CPT | Mod: VID | Performed by: COUNSELOR

## 2023-10-10 RX ORDER — SIMVASTATIN 10 MG
10 TABLET ORAL AT BEDTIME
Qty: 90 TABLET | Refills: 0 | Status: SHIPPED | OUTPATIENT
Start: 2023-10-10 | End: 2024-01-08

## 2023-10-10 RX ORDER — ZOLPIDEM TARTRATE 5 MG/1
TABLET ORAL
Qty: 30 TABLET | Refills: 0 | Status: SHIPPED | OUTPATIENT
Start: 2023-10-10 | End: 2023-11-09

## 2023-10-10 NOTE — TELEPHONE ENCOUNTER
Chart reviewed - Please advise Linette that I recommend OTC 4% strength as alternative. She is welcome to contact her insurance from her end to explore patient appeal options, but we usually do not have much success with appealing 5% strength due to 4% availability OTC.    Jenny Landrum, DNP, APRN, AGNP-C  [facility]

## 2023-10-10 NOTE — PROGRESS NOTES
Answers submitted by the patient for this visit:  Patient Health Questionnaire (Submitted on 9/19/2023)  If you checked off any problems, how difficult have these problems made it for you to do your work, take care of things at home, or get along with other people?: Very difficult  PHQ9 TOTAL SCORE: 17  BO-7 (Submitted on 9/19/2023)  BO 7 TOTAL SCORE: 18            [facility]                                     Progress Note    Patient Name: Radha Beckwith  Date: 10/10/23         Service Type: Individual      Session Start Time: 3:00pm Session End Time: 3:50pm     Session Length: 50    Session #: 10    Attendees: Client    Service Modality:  Video Visit:      Provider verified identity through the following two step process.  Patient provided:  Patient is known previously to provider    Telemedicine Visit: The patient's condition can be safely assessed and treated via synchronous audio and visual telemedicine encounter.      Reason for Telemedicine Visit: Patient convenience (e.g. access to timely appointments / distance to available provider)    Originating Site (Patient Location): Patient's home    Distant Site (Provider Location): Provider Remote Setting- Home Office    Consent:  The patient/guardian has verbally consented to: the potential risks and benefits of telemedicine (video visit) versus in person care; bill my insurance or make self-payment for services provided; and responsibility for payment of non-covered services.     Patient would like the video invitation sent by:  My Chart    Mode of Communication:  Video Conference via Amwell    Distant Location (Provider):  On-site    As the provider I attest to compliance with applicable laws and regulations related to telemedicine.    DATA  Interactive Complexity: No  Crisis: No        Progress Since Last Session (Related to Symptoms / Goals / Homework):   Symptoms: No change .    Homework: Completed in session      Episode of Care  Goals: Minimal progress - ACTION (Actively working towards change); Intervened by reinforcing change plan / affirming steps taken     Current / Ongoing Stressors and Concerns:   Client stated her car's transmission is going out and needs to do virtual.   Had the garage sale finally. Stated she didn't have help from her partner with it.   Client had an endoscopy last week, injections for pain coming up.Has CT scans and MRIs. Showed some bone protrusion. Nothing can be taken care of surgically at this point.        Treatment Objective(s) Addressed in This Session:   Increase interest, engagement, and pleasure in doing things  Feel less tired and more energy during the day        Intervention:   Discussed the physical symptoms she's been having. Discussed relationship issues and dynamics that she's been noticing and how she's been working to navigate this. Discussed various conversations she'd like to have with her boyfriend and how she can do about this.      Assessments completed prior to visit:  The following assessments were completed by patient for this visit:  PHQ9:       6/28/2023    11:26 AM 8/1/2023    12:12 PM 8/9/2023    12:54 PM 8/10/2023    12:46 PM 9/4/2023    10:00 PM 9/19/2023     9:15 AM 9/25/2023    10:18 AM   PHQ-9 SCORE   PHQ-9 Total Score MyChart 19 (Moderately severe depression) 22 (Severe depression) 22 (Severe depression) 23 (Severe depression) 18 (Moderately severe depression) 17 (Moderately severe depression) 18 (Moderately severe depression)   PHQ-9 Total Score 19 22 22 23 18 17 18     GAD7:       4/26/2023     1:00 PM 5/10/2023    12:51 PM 6/12/2023    10:19 AM 8/1/2023    12:15 PM 9/4/2023    10:01 PM 9/19/2023     9:19 AM 10/4/2023    12:32 PM   BO-7 SCORE   Total Score  17 (severe anxiety) 19 (severe anxiety) 19 (severe anxiety) 19 (severe anxiety) 18 (severe anxiety) 19 (severe anxiety)   Total Score 18 17 19    19 19 19 18    18 19     PROMIS 10-Global Health (all questions and answers  displayed):       10/26/2022    12:50 PM 1/25/2023    12:48 PM 2/7/2023    12:57 PM 4/17/2023     2:12 PM 6/25/2023     2:45 PM 8/10/2023    12:50 PM   PROMIS 10   In general, would you say your health is:    Poor Poor Fair   In general, would you say your quality of life is:    Poor Fair Poor   In general, how would you rate your physical health?    Poor Poor Poor   In general, how would you rate your mental health, including your mood and your ability to think?    Fair Fair Poor   In general, how would you rate your satisfaction with your social activities and relationships?    Fair Poor Fair   In general, please rate how well you carry out your usual social activities and roles    Fair Poor Poor   To what extent are you able to carry out your everyday physical activities such as walking, climbing stairs, carrying groceries, or moving a chair?    A little Moderately A little   In the past 7 days, how often have you been bothered by emotional problems such as feeling anxious, depressed, or irritable?    Always Always Always   In the past 7 days, how would you rate your fatigue on average?    Moderate Severe Severe   In the past 7 days, how would you rate your pain on average, where 0 means no pain, and 10 means worst imaginable pain?    8 7 7   In general, would you say your health is:    1    1 1 2   In general, would you say your quality of life is:    1    1 2 1   In general, how would you rate your physical health?    1    1 1 1   In general, how would you rate your mental health, including your mood and your ability to think?    2    2 2 1   In general, how would you rate your satisfaction with your social activities and relationships?    2    2 1 2   In general, please rate how well you carry out your usual social activities and roles. (This includes activities at home, at work and in your community, and responsibilities as a parent, child, spouse, employee, friend, etc.)    2    2 1 1   To what extent are  you able to carry out your everyday physical activities such as walking, climbing stairs, carrying groceries, or moving a chair?    2    2 3 2   In the past 7 days, how often have you been bothered by emotional problems such as feeling anxious, depressed, or irritable?    5    5 5 5   In the past 7 days, how would you rate your fatigue on average?    3    3 4 4   In the past 7 days, how would you rate your pain on average, where 0 means no pain, and 10 means worst imaginable pain?    8    8 7 7   Global Mental Health Score    6    6 6 5   Global Physical Health Score    8    8 8 7   PROMIS TOTAL - SUBSCORES    14    14 14 12       Information is confidential and restricted. Go to Review Flowsheets to unlock data.    Multiple values from one day are sorted in reverse-chronological order     [place] Suicide Severity Rating Scale (Lifetime/Recent)      10/4/2022    11:00 AM   [place] Suicide Severity Rating (Lifetime/Recent)   Q1 Wished to be Dead (Past Month) no   Q2 Suicidal Thoughts (Past Month) no   Q3 Suicidal Thought Method no   Q4 Suicidal Intent without Specific Plan no   Q5 Suicide Intent with Specific Plan yes   Q6 Suicide Behavior (Lifetime) yes   Within the Past 3 Months? no   Level of Risk per Screen high risk         ASSESSMENT: Current Emotional / Mental Status (status of significant symptoms):   Risk status (Self / Other harm or suicidal ideation)   Patient denies current fears or concerns for personal safety.   Patient denies current or recent suicidal ideation or behaviors.   Patient denies current or recent homicidal ideation or behaviors.   Patient denies current or recent self injurious behavior or ideation.   Patient denies other safety concerns.   Patient reports there has been no change in risk factors since their last session.     Patient reports there has been no change in protective factors since their last session.     Recommended that patient call 911 or go to the local ED should there be  a change in any of these risk factors.     Appearance:   Appropriate    Eye Contact:   Good    Psychomotor Behavior: Normal    Attitude:   Cooperative    Orientation:   All   Speech    Rate / Production: Normal/ Responsive Normal     Volume:  Normal    Mood:    Normal   Affect:    Appropriate    Thought Content:  Clear    Thought Form:  Coherent  Logical    Insight:    Good      Medication Review:   No changes to current psychiatric medication(s)     Medication Compliance:   Yes     Changes in Health Issues:   None reported     Chemical Use Review:   Substance Use: Chemical use reviewed, no active concerns identified      Tobacco Use: No change in amount of tobacco use since last session.  Patient declined discussion at this time    Diagnosis:  1. Moderate episode of recurrent major depressive disorder (H)    2. Generalized anxiety disorder        Collateral Reports Completed:   Not Applicable    PLAN: (Patient Tasks / Therapist Tasks / Other)  Continue to work on stress reduction skills and communicating with her boyfriend.         Ricarda Bhatia [facility]                                                         ______________________________________________________________________    Individual Treatment Plan    Patient's Name: Radha Beckwith  YOB: 1975    Date of Creation: 6/28/23  Date Treatment Plan Last Reviewed/Revised: 9/12/23    DSM5 Diagnoses: 296.32 (F33.1) Major Depressive Disorder, Recurrent Episode, Moderate _  Psychosocial / Contextual Factors: living with boyfriend, memory issues  PROMIS (reviewed every 90 days):     Referral / Collaboration:  Referral to another professional/service is not indicated at this time..    Anticipated number of session for this episode of care: 9-12 sessions  Anticipation frequency of session:  as needed  Anticipated Duration of each session: 38-52 minutes  Treatment plan will be reviewed in 90 days or when goals have been changed.        MeasurableTreatment Goal(s) related to diagnosis / functional impairment(s)  Goal 1: Patient will increase communication skills with family members.    Objective #A (Patient Action)    Patient will learn & utilize at least 2 assertive communication skills weekly.  Status: Continued - Date(s):9/12/23     Intervention(s)  Therapist will teach emotional regulation skills. distress tolerance skills, interpersonal effectiveness skills, emotion regluation skills, mindfulness skills, radical acceptance. Therapist will teach client how to ID body cues for anxiety, anxiety reduction techniques, how to ID triggers for depression and anxiety- decrease reactivity/eliminate, lifestyle changes to reduce depression and anxiety, communication skills, explore cognitive beliefs and help client to develop healthy cognitive patterns and beliefs.    Objective #B  Patient will use thought-stopping strategy daily to reduce intrusive thoughts.  Status: Continued - Date(s):9/12/23     Intervention(s)  Therapist will teach emotional regulation skills. distress tolerance skills, interpersonal effectiveness skills, emotion regluation skills, mindfulness skills, radical acceptance. Therapist will teach client how to ID body cues for anxiety, anxiety reduction techniques, how to ID triggers for depression and anxiety- decrease reactivity/eliminate, lifestyle changes to reduce depression and anxiety, communication skills, explore cognitive beliefs and help client to develop healthy cognitive patterns and beliefs.      Goal 2: Patient will decrease depression symptoms.      Objective #A (Patient Action)    Status: Continued - Date(s):9/12/23     Patient will Increase interest, engagement, and pleasure in doing things  Decrease frequency and intensity of feeling down, depressed, hopeless  Improve quantity and quality of night time sleep / decrease daytime naps  Feel less tired and more energy during the day   Improve diet, appetite, mindful  eating, and / or meal planning  Identify negative self-talk and behaviors: challenge core beliefs, myths, and actions  Improve concentration, focus, and mindfulness in daily activities   Feel less fidgety, restless or slow in daily activities / interpersonal interactions.    Intervention(s)  Therapist will teach emotional regulation skills. distress tolerance skills, interpersonal effectiveness skills, emotion regluation skills, mindfulness skills, radical acceptance. Therapist will teach client how to ID body cues for anxiety, anxiety reduction techniques, how to ID triggers for depression and anxiety- decrease reactivity/eliminate, lifestyle changes to reduce depression and anxiety, communication skills, explore cognitive beliefs and help client to develop healthy cognitive patterns and beliefs.    Objective #B  Patient will identify three distraction and diversion activities and use those activities to decrease level of anxiety  .    Status: Continued - Date(s):  9/12/23     Intervention(s)  Therapist will teach emotional regulation skills. distress tolerance skills, interpersonal effectiveness skills, emotion regluation skills, mindfulness skills, radical acceptance. Therapist will teach client how to ID body cues for anxiety, anxiety reduction techniques, how to ID triggers for depression and anxiety- decrease reactivity/eliminate, lifestyle changes to reduce depression and anxiety, communication skills, explore cognitive beliefs and help client to develop healthy cognitive patterns and beliefs.      Patient has reviewed and agreed to the above plan.      Ricarda Bhatia [facility]

## 2023-10-11 ENCOUNTER — THERAPY VISIT (OUTPATIENT)
Dept: PHYSICAL THERAPY | Facility: CLINIC | Age: 48
End: 2023-10-11
Payer: COMMERCIAL

## 2023-10-11 ENCOUNTER — E-VISIT (OUTPATIENT)
Dept: FAMILY MEDICINE | Facility: CLINIC | Age: 48
End: 2023-10-11
Payer: COMMERCIAL

## 2023-10-11 DIAGNOSIS — M54.2 CHRONIC NECK PAIN: Primary | ICD-10-CM

## 2023-10-11 DIAGNOSIS — G89.29 CHRONIC MIDLINE LOW BACK PAIN WITHOUT SCIATICA: ICD-10-CM

## 2023-10-11 DIAGNOSIS — G89.29 CHRONIC NECK PAIN: Primary | ICD-10-CM

## 2023-10-11 DIAGNOSIS — M54.50 CHRONIC MIDLINE LOW BACK PAIN WITHOUT SCIATICA: ICD-10-CM

## 2023-10-11 DIAGNOSIS — L23.7 CONTACT DERMATITIS DUE TO POISON IVY: Primary | ICD-10-CM

## 2023-10-11 PROCEDURE — 99421 OL DIG E/M SVC 5-10 MIN: CPT | Performed by: FAMILY MEDICINE

## 2023-10-11 PROCEDURE — 97112 NEUROMUSCULAR REEDUCATION: CPT | Mod: GP | Performed by: PHYSICAL THERAPIST

## 2023-10-11 RX ORDER — PREDNISONE 20 MG/1
TABLET ORAL
Qty: 20 TABLET | Refills: 0 | Status: SHIPPED | OUTPATIENT
Start: 2023-10-11 | End: 2023-10-31

## 2023-10-11 NOTE — TELEPHONE ENCOUNTER
Notified patient of PA status and recommended 4% OTC and identified that patient may contact insurance to inquire about patient appeal options.  Patient verbalizes understanding.     Reema Gaffney RN  [facility]  [phone]

## 2023-10-13 DIAGNOSIS — R11.0 NAUSEA: ICD-10-CM

## 2023-10-13 DIAGNOSIS — F41.9 ANXIETY: ICD-10-CM

## 2023-10-13 RX ORDER — ONDANSETRON 4 MG/1
TABLET, ORALLY DISINTEGRATING ORAL
Qty: 20 TABLET | Refills: 1 | Status: SHIPPED | OUTPATIENT
Start: 2023-10-13 | End: 2024-01-03

## 2023-10-13 NOTE — PROGRESS NOTES
[facility]                                                                                   OUTPATIENT PHYSICAL THERAPY    PLAN OF TREATMENT FOR OUTPATIENT REHABILITATION   Patient's Last Name, First Name, Radha Ruiz YOB: 1975   Provider's Name   [facility]   Medical Record No.  [id number]     Onset Date: 10/01/20 (Approximately  and has had chronic pain for years)  Start of Care Date: 06/15/23     Medical Diagnosis:  Pain of both sacroiliac joints (M53.3)  - Primary     Chronic neck pain (M54.2, G89.29)     Myofascial pain (M79.18)     Chronic bilateral thoracic back pain (M54.6, G89.29)     Chronic bilateral low back pain with bilateral sciatica (M54.42, M54.41, G89.29)      PT Treatment Diagnosis:  Chronic pain - neck and low back Plan of Treatment  Frequency/Duration: 2 times per week/ 4 weeks and then reassess    Certification date from 10- to 11-8-2023       See note for plan of treatment details and functional goals     Meme Peacock, PT                         I CERTIFY THE NEED FOR THESE SERVICES FURNISHED UNDER        THIS PLAN OF TREATMENT AND WHILE UNDER MY CARE     (Physician attestation of this document indicates review and certification of the therapy plan).                Referring Provider:  Jenny DENNIS CNP      Initial Assessment  See Epic Evaluation- Start of Care Date: 06/15/23           10/11/23 0500   Appointment Info   Signing clinician's name / credentials Meme Peacock PT [facility]   Visits Used 11   Medical Diagnosis Pain of both sacroiliac joints (M53.3)  - Primary     Chronic neck pain (M54.2, G89.29)     Myofascial pain (M79.18)     Chronic bilateral thoracic back pain (M54.6, G89.29)     Chronic bilateral low back pain with bilateral sciatica (M54.42, M54.41, G89.29)   PT Tx Diagnosis Chronic pain - neck and low back   Precautions/Limitations Hypersensitive system    Other pertinent information Radha has been seen in PT in the past. Here for chronic pain specific concerns neck and back. Hypersensitivity. Just had 25 teeth pulled and is in pain from this. Opted to focus on low back this session and neck assessment next session.   Quick Adds Certification   Progress Note/Certification   Start of Care Date 06/15/23   Onset of illness/injury or Date of Surgery 10/01/20  (Approximately  and has had chronic pain for years)   Therapy Frequency 2 times per week   Predicted Duration 8 weeks   Certification date from 06/15/23   Certification date to 08/10/23   Progress Note Due Date 08/10/23   Progress Note Completed Date 08/21/23       Present No   PT Goal 1   Goal Identifier Calm symptoms   Goal Description Use pain science concepts including pacing, posture, positioning and graded exposure to develop a list of tools that will calm symptoms allowing her to be active 10 minutes before symptoms escalate   Goal Progress being met if she follows ecommendations.   Target Date 11/30/23   PT Goal 2   Goal Identifier Posture   Goal Description Linette wants to be more consistant with posture and what she needs to mind posture more frequently - will sit at desk with good posture 50% of the time   Target Date 11/30/23   PT Goal 3   Goal Identifier Relaxation   Goal Description Relax shoulders and neck so she can move easier for looking around environment and to be in healthful posture   Goal Progress Neck movement is better - doing exercises   Target Date 09/30/23   Subjective Report   Subjective Report Today woke with case of poison ivy and has rash on face and neck. Low back area hurts the most today. Neck still hurting. Tried new sleeping positions and found 2 hand towel rolls under back and switch to my pillow with a nother pillow  for incline and this helps neck and back for sleep. Lower back: really bad and she talked to provider. INcreased baclafin recently.   scheduled for neck injection 10- and later for low back.  EMG : negative carpal tunnel and thinks dequervains or possibly neck. Had garage sale. In ED x 2 since last PT session. Had CT scan and R ear Increased low back pain with bending, lifting for garage sale, in garden bending   Objective Measure 1   Objective Measure ALBERT   Details 34-10-68   Objective Measure 2   Objective Measure MICHELLE (9-5)   Details 5-8 high   Objective Measure 3   Objective Measure NDI (10-30-30-60)   Details 10-37-37-74   Objective Measure 4   Objective Measure Cervical AROM/Low back ROM   Details flexion: 46 degrees; extension: 29 degrees; SB: R:22 degrees, L: 18 degrees  burning ipsliateral R side and down thoracic spine to approx T2-3 level bilaterally; rotation: R: 36 degrees, L 37 degrees  pain at end ranges/low back: Low back standing AROM: 3 rd digit 3 inches from ankle joint notes in garden across lower lumbar. Reports legs feel funny after the movement; extension minimal loss with symptoms into the gluteal. .   Objective Measure 5   Objective Measure Symptom   Details Neck: currently: 5/10 increasing to 10/10; Low back: currently 7/10,  5/10 to 10/10   Treatment Interventions (PT)   Interventions Neuromuscular Re-education   Neuromuscular Re-education   Neuromuscular re-ed of mvmt, balance, coord, kinesthetic sense, posture, proprioception minutes (12118) 58   Neuro Re-ed 1 home program/ positioning, posture   Neuro Re-ed 1 - Details supine 90-90 every 1-2 hours x 5-10 minutes monitoring symptoms for localizing and less intense, sleep position reviewed, also reviewed posture and she  made a new goal around posture.  trial of [id number] and initially reported symptoms in low back decreased 1/10 to  6/10. Education in positioning for comfort every 1-2 hours ideal and at least 3-4 times or more working to keep symptoms low level   Skilled Intervention education, assistance in positioning   Patient Response/Progress noted  increased symptoms once ROM stopped.   Education   Learner/Method Patient;Reading;Pictures/Video  (reports memory issues)   Education Comments 90-90 position, plan of care   Plan   Home program sitting: break up with standing, walking, use of lumbar roll, Mature organism model intro, cervical retraction - supine every 1-2 hours and before symptoms increase, alternate TNS placement technique, lifting properly with kinesiotape in 2 mechanical strips, scap, chakra connectionular sets, 90-90 position   Updates to plan of care 2 times per week   Plan for next session work on positions to reduce neck and low back pain   Comments   Comments Linette has not had much change in her symptoms and last was severe increase. She went to x 2. No significant change in her ALBERT, MICHELLE and NDI from last check. Does not some calming of symptoms if she completes her exercises. She does feel PT is beneficial. Recommend  continuing x 4 weeks and reassess. Expecting her symptoms to wax a  wane as she gets more active due to chronicity and wide spread symptoms. Plan to advance to aerobic activities and light strengthening with home program limited to accomodate her tolerance and ongoing pain education. She did have an increase in her cervical AROM today.   Total Session Time   Timed Code Treatment Minutes 58   Total Treatment Time (sum of timed and untimed services) 58

## 2023-10-16 DIAGNOSIS — K59.03 CONSTIPATION DUE TO OPIOID THERAPY: ICD-10-CM

## 2023-10-16 DIAGNOSIS — T40.2X5A CONSTIPATION DUE TO OPIOID THERAPY: ICD-10-CM

## 2023-10-16 RX ORDER — ALPRAZOLAM 0.5 MG
TABLET ORAL
Qty: 60 TABLET | Refills: 0 | Status: SHIPPED | OUTPATIENT
Start: 2023-10-16 | End: 2023-11-14

## 2023-10-17 ENCOUNTER — TELEPHONE (OUTPATIENT)
Dept: PALLIATIVE MEDICINE | Facility: CLINIC | Age: 48
End: 2023-10-17
Payer: COMMERCIAL

## 2023-10-17 NOTE — TELEPHONE ENCOUNTER
"Screening Questions for Radiology Injections:    Injection to be done at which interventional clinic site? [facility] [place]    If choosing [facility] for location, please inform patient:  \"[facility] is a Hospital based clinic. Before your visit, you should check with your insurance about how it covers the charges for facility services in a hospital-based clinic.     Procedure ordered by Jenny Landrum    Procedure ordered? bilateral sacroiliac joint injections     Transforaminal Cervical JASE - [place] to [facility]) - No [facility] providers perform this procedure    What insurance would patient like us to bill for this procedure? Blue plus    IF SCHEDULING IN [place] [facility] OR SPINE PLEASE SCHEDULE AT LEAST 7-10 BUSINESS DAYS OUT SO A PA CAN BE OBTAINED    Worker's comp or MVA (motor vehicle accident) -Any injection DO NOT SCHEDULE and route to Ralph Poon.      [employer] insurance - For SI joint injections, DO NOT SCHEDULE and route to Georgiana Crespo.       ALL BCBS, [employer] and [employer] - DO NOT SCHEDULE and route to Georgiana Crespo    MEDICA- facet joint injections, route to Georgiana Zak    Is patient scheduled at [facility]? no   If YES, route every encounter to [facility] SPINE CENTER CARE NAVIGATION POOL [[phone]]    Is an  needed? No     Patient has a  home? (Review Grid) YES: Informed    Any chance of pregnancy? NO   If YES, do NOT schedule and route to RN pool  - Dr. Wang route to Peyton Estes and PM&R Nurse  [[id number]]      Is patient actively being treated for cancer or immunocompromised? No  If YES, do NOT schedule and route to RN pool/ Dr. Wang's Team    Does the patient have a bleeding or clotting disorder? No     If YES, okay to schedule AND route to RN nurse / Dr. Wang's Team     (For any patients with platelet count <100, RN must forward to provider)    Is patient taking any Blood Thinners OR Antiplatelet medication?  No   If " hold needed, do NOT schedule, route to RN pool/ Dr. Wang's Team    Examples:   o Blood Thinners: (Coumadin, Warfarin, Jantoven, Pradaxa, Xarelto, Eliquis, Edoxaban, Enoxaparin, Lovenox, Heparin, Arixtra, Fondaparinux or Fragmin)  o Antiplatelet Medications: (Plavix, Brilinta or Effient)     Is patient taking any aspirin products (includes Excedrin and Fiorinal)? No     If more than 325mg/day, OK to schedule; Instruct Pt to decrease to less than 325 mg for 7 days AND route to RN pool/ Dr. Wang's Team     For CERVICAL procedures, hold all aspirin products for 6 days.     Tell Pt that if aspirin product is not held for 6 days, the procedure WILL BE cancelled.     Any allergies to contrast dye, iodine, shellfish, or numbing and steroid medications? No    If YES, schedule and add allergy information to appointment notes AND route to the RN pool/ Dr. Wang's Team    If JASE and Contrast Dye / Iodine Allergy? DO NOT SCHEDULE, route to RN pool/ Dr. Wang's Team    Allergies: Ergotamine-caffeine, Seasonal allergies, Sumatriptan, Compazine, Droperidol, Nubain [nalbuphine hcl], and Prochlorperazine     Does patient have an active infection or treated for one within the past week? No    Is patient currently taking any antibiotics or steroid medications?  No     For patients on chronic, preventative, or prophylactic antibiotics, procedures may be scheduled.     For patients on antibiotics for active or recent infection, schedule 4 days after completed.    For patients on steroid medications, schedule 4 days after completed.     Has the patient had a flu shot or any other vaccinations within the past 7 days? No  If yes, explain that for the vaccine to work best they need to:       wait 1 week before and 1 week after getting any Vaccine    wait 1 week before and 2 weeks after getting Covid Vaccine #2 or BOOSTER    If patient has concerns about the timing, send to RN pool/ Dr. Wang's Team    Does patient have an MRI/CT?  Not  Applicable Include Date and Check Procedure Scheduling Grid to see if required.    Was the MRI/CT done within the last 3 years?  NA     If no route to  New/ Dr. Wang's Team    If yes, where was the MRI/CT done?      Refer to PACS Transmissions list for approved external locations and route to RN Pool High Priority/ Dr. Zapiens Team    If MRI was not done at approved external location do NOT schedule and route to RN pool/ Dr. Zapiens Team      If patient has an imaging disc, the injection MAY be scheduled but patient must bring disc to appt or appt will be cancelled.    Is patient able to transfer to a procedure table with minimal or no assistance? Yes     If no, do NOT schedule and route to RN Pool/ Dr. Wang's Team    Procedure Specific Instructions:    If celiac plexus block, informed patient NPO for 6 hours and that it is okay to take medications with sips of water, especially blood pressure medications Not Applicable         If this is for a cervical procedure, informed patient that aspirin needs to be held for 6 days.   Not Applicable      Sedation, If Sedation is ordered for any procedure, patient must be NPO for 6 hours prior to procedure Not Applicable      If IV needed:    Do not schedule procedures requiring IV placement in the first appointment of the day or first appointment after lunch. Do NOT schedule at 0745, 0815 or 1245.       Instructed patient to arrive 30 minutes early for IV start if required. (Check Procedure Scheduling Grid)  Not Applicable    Reminders:    If you are started on any steroids or antibiotics between now and your appointment, you must contact us because the procedure may need to be cancelled.  Yes      As a reminder, receiving steroids can decrease your body's ability to fight infection.   Would you still like to move forward with scheduling the injection?  Yes      IV Sedation is not provided for procedures. If oral anti-anxiety medication is needed, the patient should  request this from their referring provider.      Instruct patient to arrive as directed prior to the scheduled appointment time:  If IV needed 30 minutes before appointment time       For patients 85 or older we recommend having an adult stay w/ them for the remainder of the day.       If the patient is Diabetic, remind them to bring their glucometer.      Does the patient have any questions?  NO  Gabriella Kaur  [facility]

## 2023-10-18 ENCOUNTER — VIRTUAL VISIT (OUTPATIENT)
Dept: PSYCHOLOGY | Facility: CLINIC | Age: 48
End: 2023-10-18
Payer: COMMERCIAL

## 2023-10-18 DIAGNOSIS — F41.1 GENERALIZED ANXIETY DISORDER: ICD-10-CM

## 2023-10-18 DIAGNOSIS — F33.1 MODERATE EPISODE OF RECURRENT MAJOR DEPRESSIVE DISORDER (H): Primary | ICD-10-CM

## 2023-10-18 PROCEDURE — 90834 PSYTX W PT 45 MINUTES: CPT | Mod: VID | Performed by: COUNSELOR

## 2023-10-18 RX ORDER — LACTULOSE 10 G/15ML
10 SOLUTION ORAL 3 TIMES DAILY PRN
Qty: 300 ML | Refills: 3 | Status: SHIPPED | OUTPATIENT
Start: 2023-10-18 | End: 2024-04-29

## 2023-10-18 NOTE — PROGRESS NOTES
Answers submitted by the patient for this visit:  Patient Health Questionnaire (Submitted on 9/19/2023)  If you checked off any problems, how difficult have these problems made it for you to do your work, take care of things at home, or get along with other people?: Very difficult  PHQ9 TOTAL SCORE: 17  BO-7 (Submitted on 9/19/2023)  BO 7 TOTAL SCORE: 18            [facility]                                     Progress Note    Patient Name: Radha Beckwith  Date: 10/18/23         Service Type: Individual      Session Start Time: 1:00pm Session End Time: 1:50pm     Session Length: 50    Session #: 11    Attendees: Client    Service Modality:  Video Visit:      Provider verified identity through the following two step process.  Patient provided:  Patient is known previously to provider    Telemedicine Visit: The patient's condition can be safely assessed and treated via synchronous audio and visual telemedicine encounter.      Reason for Telemedicine Visit: Patient convenience (e.g. access to timely appointments / distance to available provider)    Originating Site (Patient Location): Patient's home    Distant Site (Provider Location): Provider Remote Setting- Home Office    Consent:  The patient/guardian has verbally consented to: the potential risks and benefits of telemedicine (video visit) versus in person care; bill my insurance or make self-payment for services provided; and responsibility for payment of non-covered services.     Patient would like the video invitation sent by:  My Chart    Mode of Communication:  Video Conference via Amwell    Distant Location (Provider):  On-site    As the provider I attest to compliance with applicable laws and regulations related to telemedicine.    DATA  Interactive Complexity: No  Crisis: No        Progress Since Last Session (Related to Symptoms / Goals / Homework):   Symptoms: No change .    Homework: Completed in session      Episode of Care  Goals: Minimal progress - ACTION (Actively working towards change); Intervened by reinforcing change plan / affirming steps taken     Current / Ongoing Stressors and Concerns:   Client stated she has a neck injection on the 20th. She had asked her boyfriend if he would be willing to take her to it but he scoffed at it.   Increasing her gabapentin. Has noticed since increasing it she's felt a lot more tired throughout the day. She stated since they've increased it she struggles to keep her eyes open most of the day, she has fallen asleep while eating, doesn't have motivation to do daily activities around the house.        Treatment Objective(s) Addressed in This Session:   Increase interest, engagement, and pleasure in doing things  Feel less tired and more energy during the day        Intervention:   Continued to talk about her relationship and how she can talk with her boyfriend regarding things she would like him to better understand about her and other dynamics. Talked about the pain she's been feeling and how she's working to help herself through it.     Assessments completed prior to visit:  The following assessments were completed by patient for this visit:  PHQ9:       6/28/2023    11:26 AM 8/1/2023    12:12 PM 8/9/2023    12:54 PM 8/10/2023    12:46 PM 9/4/2023    10:00 PM 9/19/2023     9:15 AM 9/25/2023    10:18 AM   PHQ-9 SCORE   PHQ-9 Total Score MyChart 19 (Moderately severe depression) 22 (Severe depression) 22 (Severe depression) 23 (Severe depression) 18 (Moderately severe depression) 17 (Moderately severe depression) 18 (Moderately severe depression)   PHQ-9 Total Score 19 22 22 23 18 17 18     GAD7:       4/26/2023     1:00 PM 5/10/2023    12:51 PM 6/12/2023    10:19 AM 8/1/2023    12:15 PM 9/4/2023    10:01 PM 9/19/2023     9:19 AM 10/4/2023    12:32 PM   BO-7 SCORE   Total Score  17 (severe anxiety) 19 (severe anxiety) 19 (severe anxiety) 19 (severe anxiety) 18 (severe anxiety) 19 (severe  anxiety)   Total Score 18 17 19    19 19 19 18    18 19     PROMIS 10-Global Health (all questions and answers displayed):       10/26/2022    12:50 PM 1/25/2023    12:48 PM 2/7/2023    12:57 PM 4/17/2023     2:12 PM 6/25/2023     2:45 PM 8/10/2023    12:50 PM   PROMIS 10   In general, would you say your health is:    Poor Poor Fair   In general, would you say your quality of life is:    Poor Fair Poor   In general, how would you rate your physical health?    Poor Poor Poor   In general, how would you rate your mental health, including your mood and your ability to think?    Fair Fair Poor   In general, how would you rate your satisfaction with your social activities and relationships?    Fair Poor Fair   In general, please rate how well you carry out your usual social activities and roles    Fair Poor Poor   To what extent are you able to carry out your everyday physical activities such as walking, climbing stairs, carrying groceries, or moving a chair?    A little Moderately A little   In the past 7 days, how often have you been bothered by emotional problems such as feeling anxious, depressed, or irritable?    Always Always Always   In the past 7 days, how would you rate your fatigue on average?    Moderate Severe Severe   In the past 7 days, how would you rate your pain on average, where 0 means no pain, and 10 means worst imaginable pain?    8 7 7   In general, would you say your health is:    1    1 1 2   In general, would you say your quality of life is:    1    1 2 1   In general, how would you rate your physical health?    1    1 1 1   In general, how would you rate your mental health, including your mood and your ability to think?    2    2 2 1   In general, how would you rate your satisfaction with your social activities and relationships?    2    2 1 2   In general, please rate how well you carry out your usual social activities and roles. (This includes activities at home, at work and in your community,  and responsibilities as a parent, child, spouse, employee, friend, etc.)    2    2 1 1   To what extent are you able to carry out your everyday physical activities such as walking, climbing stairs, carrying groceries, or moving a chair?    2    2 3 2   In the past 7 days, how often have you been bothered by emotional problems such as feeling anxious, depressed, or irritable?    5    5 5 5   In the past 7 days, how would you rate your fatigue on average?    3    3 4 4   In the past 7 days, how would you rate your pain on average, where 0 means no pain, and 10 means worst imaginable pain?    8    8 7 7   Global Mental Health Score    6    6 6 5   Global Physical Health Score    8    8 8 7   PROMIS TOTAL - SUBSCORES    14    14 14 12       Information is confidential and restricted. Go to Review Flowsheets to unlock data.    Multiple values from one day are sorted in reverse-chronological order     [place] Suicide Severity Rating Scale (Lifetime/Recent)      10/4/2022    11:00 AM   [place] Suicide Severity Rating (Lifetime/Recent)   Q1 Wished to be Dead (Past Month) no   Q2 Suicidal Thoughts (Past Month) no   Q3 Suicidal Thought Method no   Q4 Suicidal Intent without Specific Plan no   Q5 Suicide Intent with Specific Plan yes   Q6 Suicide Behavior (Lifetime) yes   Within the Past 3 Months? no   Level of Risk per Screen high risk         ASSESSMENT: Current Emotional / Mental Status (status of significant symptoms):   Risk status (Self / Other harm or suicidal ideation)   Patient denies current fears or concerns for personal safety.   Patient denies current or recent suicidal ideation or behaviors.   Patient denies current or recent homicidal ideation or behaviors.   Patient denies current or recent self injurious behavior or ideation.   Patient denies other safety concerns.   Patient reports there has been no change in risk factors since their last session.     Patient reports there has been no change in protective  factors since their last session.     Recommended that patient call 911 or go to the local ED should there be a change in any of these risk factors.     Appearance:   Appropriate    Eye Contact:   Good    Psychomotor Behavior: Normal    Attitude:   Cooperative    Orientation:   All   Speech    Rate / Production: Normal/ Responsive Normal     Volume:  Normal    Mood:    Normal   Affect:    Appropriate    Thought Content:  Clear    Thought Form:  Coherent  Logical    Insight:    Good      Medication Review:   No changes to current psychiatric medication(s)     Medication Compliance:   Yes     Changes in Health Issues:   None reported     Chemical Use Review:   Substance Use: Chemical use reviewed, no active concerns identified      Tobacco Use: No change in amount of tobacco use since last session.  Patient declined discussion at this time    Diagnosis:  1. Moderate episode of recurrent major depressive disorder (H)    2. Generalized anxiety disorder          Collateral Reports Completed:   Not Applicable    PLAN: (Patient Tasks / Therapist Tasks / Other)  Continue to work on stress reduction skills and communicating with her boyfriend.         Ricarda Bhatia, [facility]                                                         ______________________________________________________________________    Individual Treatment Plan    Patient's Name: Radha Beckwith  YOB: 1975    Date of Creation: 6/28/23  Date Treatment Plan Last Reviewed/Revised: 9/12/23    DSM5 Diagnoses: 296.32 (F33.1) Major Depressive Disorder, Recurrent Episode, Moderate _  Psychosocial / Contextual Factors: living with boyfriend, memory issues  PROMIS (reviewed every 90 days):     Referral / Collaboration:  Referral to another professional/service is not indicated at this time..    Anticipated number of session for this episode of care: 9-12 sessions  Anticipation frequency of session:  as needed  Anticipated Duration of each session:  38-52 minutes  Treatment plan will be reviewed in 90 days or when goals have been changed.       MeasurableTreatment Goal(s) related to diagnosis / functional impairment(s)  Goal 1: Patient will increase communication skills with family members.    Objective #A (Patient Action)    Patient will learn & utilize at least 2 assertive communication skills weekly.  Status: Continued - Date(s):9/12/23     Intervention(s)  Therapist will teach emotional regulation skills. distress tolerance skills, interpersonal effectiveness skills, emotion regluation skills, mindfulness skills, radical acceptance. Therapist will teach client how to ID body cues for anxiety, anxiety reduction techniques, how to ID triggers for depression and anxiety- decrease reactivity/eliminate, lifestyle changes to reduce depression and anxiety, communication skills, explore cognitive beliefs and help client to develop healthy cognitive patterns and beliefs.    Objective #B  Patient will use thought-stopping strategy daily to reduce intrusive thoughts.  Status: Continued - Date(s):9/12/23     Intervention(s)  Therapist will teach emotional regulation skills. distress tolerance skills, interpersonal effectiveness skills, emotion regluation skills, mindfulness skills, radical acceptance. Therapist will teach client how to ID body cues for anxiety, anxiety reduction techniques, how to ID triggers for depression and anxiety- decrease reactivity/eliminate, lifestyle changes to reduce depression and anxiety, communication skills, explore cognitive beliefs and help client to develop healthy cognitive patterns and beliefs.      Goal 2: Patient will decrease depression symptoms.      Objective #A (Patient Action)    Status: Continued - Date(s):9/12/23     Patient will Increase interest, engagement, and pleasure in doing things  Decrease frequency and intensity of feeling down, depressed, hopeless  Improve quantity and quality of night time sleep / decrease  daytime naps  Feel less tired and more energy during the day   Improve diet, appetite, mindful eating, and / or meal planning  Identify negative self-talk and behaviors: challenge core beliefs, myths, and actions  Improve concentration, focus, and mindfulness in daily activities   Feel less fidgety, restless or slow in daily activities / interpersonal interactions.    Intervention(s)  Therapist will teach emotional regulation skills. distress tolerance skills, interpersonal effectiveness skills, emotion regluation skills, mindfulness skills, radical acceptance. Therapist will teach client how to ID body cues for anxiety, anxiety reduction techniques, how to ID triggers for depression and anxiety- decrease reactivity/eliminate, lifestyle changes to reduce depression and anxiety, communication skills, explore cognitive beliefs and help client to develop healthy cognitive patterns and beliefs.    Objective #B  Patient will identify three distraction and diversion activities and use those activities to decrease level of anxiety  .    Status: Continued - Date(s):  9/12/23     Intervention(s)  Therapist will teach emotional regulation skills. distress tolerance skills, interpersonal effectiveness skills, emotion regluation skills, mindfulness skills, radical acceptance. Therapist will teach client how to ID body cues for anxiety, anxiety reduction techniques, how to ID triggers for depression and anxiety- decrease reactivity/eliminate, lifestyle changes to reduce depression and anxiety, communication skills, explore cognitive beliefs and help client to develop healthy cognitive patterns and beliefs.      Patient has reviewed and agreed to the above plan.      ERIK Keller

## 2023-10-18 NOTE — TELEPHONE ENCOUNTER
Routing refill request to provider for review/approval because:  Drug not on the [facility] refill protocol     Juana Junior RN

## 2023-10-18 NOTE — TELEPHONE ENCOUNTER
LVM to schedule bilateral sacroiliac joint injections        Esha Rain  Complex   [facility]

## 2023-10-20 ENCOUNTER — HOSPITAL ENCOUNTER (OUTPATIENT)
Dept: GENERAL RADIOLOGY | Facility: CLINIC | Age: 48
Discharge: HOME | End: 2023-10-20
Attending: ANESTHESIOLOGY | Admitting: ANESTHESIOLOGY
Payer: COMMERCIAL

## 2023-10-20 ENCOUNTER — ANESTHESIA (OUTPATIENT)
Dept: SURGERY | Facility: CLINIC | Age: 48
End: 2023-10-20
Payer: COMMERCIAL

## 2023-10-20 ENCOUNTER — HOSPITAL ENCOUNTER (OUTPATIENT)
Facility: CLINIC | Age: 48
Discharge: HOME | End: 2023-10-20
Attending: ANESTHESIOLOGY | Admitting: ANESTHESIOLOGY
Payer: COMMERCIAL

## 2023-10-20 ENCOUNTER — ANESTHESIA EVENT (OUTPATIENT)
Dept: SURGERY | Facility: CLINIC | Age: 48
End: 2023-10-20
Payer: COMMERCIAL

## 2023-10-20 VITALS
HEART RATE: 72 BPM | BODY MASS INDEX: 29.88 KG/M2 | OXYGEN SATURATION: 99 % | TEMPERATURE: 98.8 F | WEIGHT: 175 LBS | DIASTOLIC BLOOD PRESSURE: 70 MMHG | HEIGHT: 64 IN | SYSTOLIC BLOOD PRESSURE: 118 MMHG | RESPIRATION RATE: 18 BRPM

## 2023-10-20 DIAGNOSIS — M54.12 CERVICAL RADICULOPATHY: ICD-10-CM

## 2023-10-20 PROCEDURE — 370N000017 HC ANESTHESIA TECHNICAL FEE, PER MIN: Performed by: ANESTHESIOLOGY

## 2023-10-20 PROCEDURE — 999N000179 XR SURGERY CARM FLUORO LESS THAN 5 MIN W STILLS: Mod: TC

## 2023-10-20 PROCEDURE — 62321 NJX INTERLAMINAR CRV/THRC: CPT | Performed by: ANESTHESIOLOGY

## 2023-10-20 PROCEDURE — 250N000011 HC RX IP 250 OP 636: Performed by: NURSE ANESTHETIST, CERTIFIED REGISTERED

## 2023-10-20 PROCEDURE — 250N000011 HC RX IP 250 OP 636: Performed by: ANESTHESIOLOGY

## 2023-10-20 PROCEDURE — 250N000009 HC RX 250: Performed by: NURSE ANESTHETIST, CERTIFIED REGISTERED

## 2023-10-20 PROCEDURE — 250N000011 HC RX IP 250 OP 636: Mod: JZ | Performed by: ANESTHESIOLOGY

## 2023-10-20 RX ORDER — TRIAMCINOLONE ACETONIDE 40 MG/ML
INJECTION, SUSPENSION INTRA-ARTICULAR; INTRAMUSCULAR PRN
Status: DISCONTINUED | OUTPATIENT
Start: 2023-10-20 | End: 2023-10-20 | Stop reason: HOSPADM

## 2023-10-20 RX ORDER — PROPOFOL 10 MG/ML
INJECTION, EMULSION INTRAVENOUS PRN
Status: DISCONTINUED | OUTPATIENT
Start: 2023-10-20 | End: 2023-10-20

## 2023-10-20 RX ORDER — IOPAMIDOL 612 MG/ML
INJECTION, SOLUTION INTRATHECAL PRN
Status: DISCONTINUED | OUTPATIENT
Start: 2023-10-20 | End: 2023-10-20 | Stop reason: HOSPADM

## 2023-10-20 RX ORDER — LIDOCAINE HYDROCHLORIDE 10 MG/ML
INJECTION, SOLUTION INFILTRATION; PERINEURAL PRN
Status: DISCONTINUED | OUTPATIENT
Start: 2023-10-20 | End: 2023-10-20

## 2023-10-20 RX ADMIN — LIDOCAINE HYDROCHLORIDE 50 MG: 10 INJECTION, SOLUTION INFILTRATION; PERINEURAL at 14:54

## 2023-10-20 RX ADMIN — PROPOFOL 50 MG: 10 INJECTION, EMULSION INTRAVENOUS at 14:54

## 2023-10-20 RX ADMIN — PROPOFOL 50 MG: 10 INJECTION, EMULSION INTRAVENOUS at 14:55

## 2023-10-20 RX ADMIN — PROPOFOL 40 MG: 10 INJECTION, EMULSION INTRAVENOUS at 14:57

## 2023-10-20 ASSESSMENT — ACTIVITIES OF DAILY LIVING (ADL): ADLS_ACUITY_SCORE: 35

## 2023-10-20 ASSESSMENT — ENCOUNTER SYMPTOMS: DYSRHYTHMIAS: 1

## 2023-10-20 ASSESSMENT — LIFESTYLE VARIABLES: TOBACCO_USE: 1

## 2023-10-20 NOTE — ANESTHESIA PREPROCEDURE EVALUATION
Anesthesia Pre-Procedure Evaluation    Patient: Radha Beckwith   MRN: [id number] : 1975        Procedure : Procedure(s):  Inject epidural cervical          Past Medical History:   Diagnosis Date     Abdominal pain, right lower quadrant 2008    Admit. Discharged 03/10/08     Anxiety attack 2015     Atypical chest pain 2015     De Quervain's disease (tenosynovitis)      Dehydration      Gastric ulcer 2015     GERD (gastroesophageal reflux disease) 2010    Takes omeprazole and metoclopramide      Ingrowing nail 2014     Migraines      Opioid dependence in remission (H) 2015     Other and unspecified ovarian cyst      Papanicolaou smear of cervix with low grade squamous intraepithelial lesion (LGSIL) 2017      Past Surgical History:   Procedure Laterality Date     BIOPSY CERVICAL, LOCAL EXCISION, SINGLE/MULTIPLE N/A 8/10/2017    Procedure: BIOPSY CERVICAL, LOCAL EXCISION, SINGLE/MULTIPLE;;  Surgeon: Michael Chandler MD;  Location: [facility]     COLONOSCOPY N/A 2023    Procedure: COLONOSCOPY;  Surgeon: Michael Dozier MD;  Location: [facility]     COLPOSCOPY, BIOPSY, COMBINED N/A 8/10/2017    Procedure: COMBINED COLPOSCOPY, BIOPSY;  Colposcopy with Cervical Biopsies and Endometrial Biopsy, Exam with Ultrasound;  Surgeon: Michael Chandler MD;  Location: [facility]     ESOPHAGOSCOPY, GASTROSCOPY, DUODENOSCOPY (EGD), COMBINED N/A 2017    Procedure: COMBINED ESOPHAGOSCOPY, GASTROSCOPY, DUODENOSCOPY (EGD);  Surgeon: Ibrahima Esposito MD;  [facility]     ESOPHAGOSCOPY, GASTROSCOPY, DUODENOSCOPY (EGD), COMBINED N/A 2023    Procedure: ESOPHAGOGASTRODUODENOSCOPY, WITH BIOPSY;  Surgeon: Michael Dozier MD;  [facility]     ESOPHAGOSCOPY, GASTROSCOPY, DUODENOSCOPY (EGD), COMBINED N/A 10/3/2023    Procedure: ESOPHAGOGASTRODUODENOSCOPY, WITH BIOPSY;  Surgeon: John Paul Varela MD;  Location[facility]     EXAM UNDER ANESTHESIA PELVIC N/A  8/10/2017    Procedure: EXAM UNDER ANESTHESIA PELVIC;;  Surgeon: Michael Chandler MD;  [facility]     HYSTERECTOMY       HYSTERECTOMY, PAP NO LONGER INDICATED       INJECT EPIDURAL LUMBAR Bilateral 7/1/2022    Procedure: Lumbar 5-Sacral 1 Transforaminal Epidural Steroid Injection with fluoroscopic guidance and contrast, bilateral;  Surgeon: Trevor Ivory MD;  [facility]: [facility]     LAPAROSCOPIC CHOLECYSTECTOMY N/A 2/2/2018    Procedure: LAPAROSCOPIC CHOLECYSTECTOMY;  Laparoscopic Cholecystectomy;  Surgeon: Tigre Lowry DO;  [facility]     LAPAROSCOPIC HYSTERECTOMY TOTAL N/A 10/30/2017    Procedure: LAPAROSCOPIC HYSTERECTOMY TOTAL;  LAPAROSCOPIC HYSTERECTOMY TOTAL POSSIBLE SALPINGO-OOPHERECTOMY (BILATERAL);  Surgeon: Michael Chandler MD;  Location:  OR     [facility] UGI ENDOSCOPY, SIMPLE EXAM  01/07/08      Allergies   Allergen Reactions     Ergotamine-Caffeine      12-            GI problems-     Seasonal Allergies      Sumatriptan      vomits after giving herself a shot     Compazine Anxiety     Droperidol Anxiety     Nubain [Nalbuphine Hcl] Anxiety     Prochlorperazine Palpitations     Uncontrolled movement      Social History     Tobacco Use     Smoking status: Some Days     Packs/day: 0.25     Years: 20.00     Additional pack years: 0.00     Total pack years: 5.00     Types: Cigarettes     Smokeless tobacco: Never   Substance Use Topics     Alcohol use: Yes     Comment: occasional drinks; about 5-6 beers/week      Wt Readings from Last 1 Encounters:   10/06/23 79.7 kg (175 lb 12.8 oz)        Anesthesia Evaluation   Pt has had prior anesthetic. Type: General and MAC.    No history of anesthetic complications       ROS/MED HX  ENT/Pulmonary:     (+)     MOHINDER risk factors,    daytime somnolence,       tobacco use, Current use,  5  Pack-Year Hx,                     Neurologic:     (+)      migraines,                          Cardiovascular:     (+) Dyslipidemia - -   -  -  -                        dysrhythmias (SVT),         Previous cardiac testing   Echo: Date: Results:    Stress Test:  Date: Results:    ECG Reviewed:  Date: 11/24/19 Results:  SR  Cath:  Date: Results:      METS/Exercise Tolerance:     Hematologic:  - neg hematologic  ROS     Musculoskeletal:       GI/Hepatic:     (+) GERD, Asymptomatic on medication,                  Renal/Genitourinary:  - neg Renal ROS     Endo:  - neg endo ROS     Psychiatric/Substance Use:     (+) psychiatric history depression and anxiety  H/O chronic opiod use . : opiod dependence in remnission - on suboxone treatment.    Infectious Disease:  - neg infectious disease ROS     Malignancy:  - neg malignancy ROS     Other:  - neg other ROS    (+)  , H/O Chronic Pain,         Physical Exam    Airway  airway exam normal      Mallampati: II   TM distance: > 3 FB   Neck ROM: full   Mouth opening: > 3 cm    Respiratory Devices and Support         Dental  no notable dental history         Cardiovascular   cardiovascular exam normal       Rhythm and rate: regular and normal     Pulmonary   pulmonary exam normal        breath sounds clear to auscultation       OUTSIDE LABS:  CBC:   Lab Results   Component Value Date    WBC 8.0 09/13/2023    WBC 6.1 09/06/2023    HGB 12.8 09/13/2023    HGB 12.4 09/06/2023    HCT 38.4 09/13/2023    HCT 36.5 09/06/2023     09/13/2023     09/06/2023     BMP:   Lab Results   Component Value Date     09/13/2023     09/06/2023    POTASSIUM 3.5 09/13/2023    POTASSIUM 3.1 (L) 09/06/2023    CHLORIDE 101 09/13/2023    CHLORIDE 98 09/06/2023    CO2 24 09/13/2023    CO2 25 09/06/2023    BUN 8.2 09/13/2023    BUN 12.0 09/06/2023    CR 0.68 09/13/2023    CR 0.76 09/06/2023     (H) 09/13/2023     (H) 09/06/2023     COAGS:   Lab Results   Component Value Date    PTT 24 03/24/2017    INR 0.87 03/24/2017     POC:   Lab Results   Component Value Date    HCG Negative 05/22/2013    HCGS Negative  09/06/2023     HEPATIC:   Lab Results   Component Value Date    ALBUMIN 4.3 09/13/2023    PROTTOTAL 7.2 09/13/2023    ALT 25 09/13/2023    AST 24 09/13/2023    ALKPHOS 87 09/13/2023    BILITOTAL <0.2 09/13/2023     OTHER:   Lab Results   Component Value Date    A1C 5.0 11/24/2019    MOISES 9.3 09/13/2023    PHOS 2.7 08/17/2007    MAG 1.6 08/17/2007    LIPASE 20 09/13/2023    AMYLASE 37 01/24/2018    TSH 2.58 09/06/2023    CRP 6.3 03/02/2021    SED 17 09/13/2023       Anesthesia Plan    ASA Status:  2    NPO Status:  NPO Appropriate    Anesthesia Type: General.     - Airway: ETT   Induction: Intravenous.   Maintenance: Balanced.        Consents    Anesthesia Plan(s) and associated risks, benefits, and realistic alternatives discussed. Questions answered and patient/representative(s) expressed understanding.     - Discussed:     - Discussed with:  Patient      - Extended Intubation/Ventilatory Support Discussed: No.      - Patient is DNR/DNI Status: No     Use of blood products discussed: No .     Postoperative Care    Pain management: IV analgesics, Multi-modal analgesia.   PONV prophylaxis: Ondansetron (or other 5HT-3), Dexamethasone or Solumedrol, Background Propofol Infusion     Comments:    Other Comments: The risks and benefits of anesthesia, and the alternatives where applicable, have been discussed with the patient, and they wish to proceed.            JEANNIE Gamboa CRNA

## 2023-10-20 NOTE — OP NOTE
CHIEF COMPLAINT: Neck pain with radicular arm pains    INDICATIONS FOR PROCEDURE:   1.This patient suffers from moderate to severe neck pain and upper extremity radicular pains.    2.This pain has persisted for more than 4 weeks and is causing significant functional disability when they are trying to perform ADL's.   3. They failed conservative care which consisted of giving this pain time to gisele, PT, medications  4. Preoperative NRS pain score was verbally reported to me today as a  7/10.   5. The patients radicular pains correlates to their MRI which shows bilateral C4-5 foraminal stenosis mild to moderate.  6.  An order was sent to me to perform the technical component of a cervical epidural steroid injection.    PROCEDURE: C6-7 Interlaminar epidural steroid injection using fluoroscopic guidance with contrast dye.     PROCEDURE DETAILS: After written informed consent was obtained from the patient, the patient was escorted to the procedure room.  The patient was placed in the prone position.  A  time out  was conducted to verify patient identity, procedure to be performed, side, site, allergies and any special requirements.  The skin over the neck and upper back region were prepped and draped in normal sterile fashion utilizing ChloraPrep.  Fluoroscopy was used to identify the C6-7 interspace in an AP view and the skin was anesthetized with 2 mL of 1% lidocaine with bicarbonate buffer.  A 20-gauge 3-1/2 inch Tuohy needle was advanced using the loss of resistance technique with preservative free normal saline with fluoroscopic guidance. After negative aspiration for CSF and blood, 1.5 cc of Isovue contrast dye was injected revealing the appropriate cervical epidurogram without evidence of intrathecal or intravascular spread. Following this, a 3-mL solution of 40 mg of triamcinolone with 2 cc preservative-free normal saline was slowly injected.  There is good spread of medication covering up to the C4-5 level  bilaterally.  After injection of the medication, as the needle tip was withdrawn, it was flushed with local anesthetic.  The patient was monitored with blood pressure and pulse oximetry machines with the assistance of an RN throughout the procedure.  The patient was alert and responsive to questions throughout the procedure.   The patient tolerated the procedure well and was observed in the post-procedural area.  The patient was dismissed without apparent complications.     BLOOD LOSS: less than 5 cc    DIAGNOSIS:  1.  Neck pain and bilateral shoulder pain secondary to bilateral C4-5 foraminal stenosis    PLAN:    1. Performed a C6-7 interlaminar epidural steroid injection without complications.   2. The patient was instructed follow-up with the referring provider and to call the [facility] if today's procedure is not helpful.  If this is not helpful I recommend that she has medial branch blocks at C5-6 and C6-7.    Trevor Ivory MD  Diplomate of the [employer] Board of Anesthesiology, Pain Medicine

## 2023-10-20 NOTE — ANESTHESIA POSTPROCEDURE EVALUATION
Patient: Radha Beckwith    Procedure: Procedure(s):  Cervical 6-7 epidural injection       Anesthesia Type:  General    Note:  Disposition: Outpatient   Postop Pain Control: Uneventful            Sign Out: Well controlled pain   PONV: No   Neuro/Psych: Uneventful            Sign Out: Acceptable/Baseline neuro status   Airway/Respiratory: Uneventful            Sign Out: Acceptable/Baseline resp. status   CV/Hemodynamics: Uneventful            Sign Out: Acceptable CV status   Other NRE: NONE   DID A NON-ROUTINE EVENT OCCUR? No    Event details/Postop Comments:  Pt was happy with anesthesia care.  No complications.  I will follow up with the pt if needed.           Last vitals:  Vitals Value Taken Time   /70 10/20/23 1530   Temp     Pulse 72 10/20/23 1530   Resp 18 10/20/23 1530   SpO2 99 % 10/20/23 1530       Electronically Signed By: JEANNIE Gamboa CRNA  October 20, 2023  3:53 PM

## 2023-10-20 NOTE — DISCHARGE INSTRUCTIONS

## 2023-10-24 ENCOUNTER — VIRTUAL VISIT (OUTPATIENT)
Dept: PSYCHOLOGY | Facility: OTHER | Age: 48
End: 2023-10-24
Payer: COMMERCIAL

## 2023-10-24 DIAGNOSIS — F41.1 GENERALIZED ANXIETY DISORDER: ICD-10-CM

## 2023-10-24 DIAGNOSIS — F33.1 MODERATE EPISODE OF RECURRENT MAJOR DEPRESSIVE DISORDER (H): Primary | ICD-10-CM

## 2023-10-24 PROCEDURE — 90834 PSYTX W PT 45 MINUTES: CPT | Mod: VID | Performed by: COUNSELOR

## 2023-10-24 ASSESSMENT — PATIENT HEALTH QUESTIONNAIRE - PHQ9
10. IF YOU CHECKED OFF ANY PROBLEMS, HOW DIFFICULT HAVE THESE PROBLEMS MADE IT FOR YOU TO DO YOUR WORK, TAKE CARE OF THINGS AT HOME, OR GET ALONG WITH OTHER PEOPLE: VERY DIFFICULT
SUM OF ALL RESPONSES TO PHQ QUESTIONS 1-9: 15
SUM OF ALL RESPONSES TO PHQ QUESTIONS 1-9: 15

## 2023-10-24 NOTE — PROGRESS NOTES
Answers submitted by the patient for this visit:  Patient Health Questionnaire (Submitted on 10/24/2023)  If you checked off any problems, how difficult have these problems made it for you to do your work, take care of things at home, or get along with other people?: Very difficult  PHQ9 TOTAL SCORE: 15  Answers submitted by the patient for this visit:  Patient Health Questionnaire (Submitted on 9/19/2023)  If you checked off any problems, how difficult have these problems made it for you to do your work, take care of things at home, or get along with other people?: Very difficult  PHQ9 TOTAL SCORE: 17  BO-7 (Submitted on 9/19/2023)  BO 7 TOTAL SCORE: 18            [facility]                                     Progress Note    Patient Name: Radha Beckwith  Date: 10/24/23         Service Type: Individual      Session Start Time: 2:10pm Session End Time: 3:00pm     Session Length: 50    Session #: 12    Attendees: Client    Service Modality:  Video Visit:      Provider verified identity through the following two step process.  Patient provided:  Patient is known previously to provider    Telemedicine Visit: The patient's condition can be safely assessed and treated via synchronous audio and visual telemedicine encounter.      Reason for Telemedicine Visit: Patient convenience (e.g. access to timely appointments / distance to available provider)    Originating Site (Patient Location): Patient's home    Distant Site (Provider Location): Provider [facility] Setting- Home Office    Consent:  The patient/guardian has verbally consented to: the potential risks and benefits of telemedicine (video visit) versus in person care; bill my insurance or make self-payment for services provided; and responsibility for payment of non-covered services.     Patient would like the video invitation sent by:  My Chart    Mode of Communication:  Video Conference via Easily[employer]    Distant Location (Provider):  On-site    As  the provider I attest to compliance with applicable laws and regulations related to telemedicine.    DATA  Interactive Complexity: No  Crisis: No        Progress Since Last Session (Related to Symptoms / Goals / Homework):   Symptoms: No change .    Homework: Completed in session      Episode of Care Goals: Minimal progress - ACTION (Actively working towards change); Intervened by reinforcing change plan / affirming steps taken     Current / Ongoing Stressors and Concerns:   Client stated her paternal aunt's mother recently passed away and she just found out.   She stated she and her boyfriend got into a big argument last night.        Treatment Objective(s) Addressed in This Session:   Increase interest, engagement, and pleasure in doing things  Feel less tired and more energy during the day        Intervention:   Processed through the arguments she and her boyfriend had last night. Talked about things she still wants to clarify with him about expectations or what's going on with her pain.     Assessments completed prior to visit:  The following assessments were completed by patient for this visit:  PHQ9:       8/1/2023    12:12 PM 8/9/2023    12:54 PM 8/10/2023    12:46 PM 9/4/2023    10:00 PM 9/19/2023     9:15 AM 9/25/2023    10:18 AM 10/24/2023    11:19 AM   PHQ-9 SCORE   PHQ-9 Total Score MyChart 22 (Severe depression) 22 (Severe depression) 23 (Severe depression) 18 (Moderately severe depression) 17 (Moderately severe depression) 18 (Moderately severe depression) 15 (Moderately severe depression)   PHQ-9 Total Score 22 22 23 18 17 18 15     GAD7:       4/26/2023     1:00 PM 5/10/2023    12:51 PM 6/12/2023    10:19 AM 8/1/2023    12:15 PM 9/4/2023    10:01 PM 9/19/2023     9:19 AM 10/4/2023    12:32 PM   BO-7 SCORE   Total Score  17 (severe anxiety) 19 (severe anxiety) 19 (severe anxiety) 19 (severe anxiety) 18 (severe anxiety) 19 (severe anxiety)   Total Score 18 17 19    19 19 19 18    18 19     PROMIS  10-Global Health (all questions and answers displayed):       10/26/2022    12:50 PM 1/25/2023    12:48 PM 2/7/2023    12:57 PM 4/17/2023     2:12 PM 6/25/2023     2:45 PM 8/10/2023    12:50 PM   PROMIS 10   In general, would you say your health is:    Poor Poor Fair   In general, would you say your quality of life is:    Poor Fair Poor   In general, how would you rate your physical health?    Poor Poor Poor   In general, how would you rate your mental health, including your mood and your ability to think?    Fair Fair Poor   In general, how would you rate your satisfaction with your social activities and relationships?    Fair Poor Fair   In general, please rate how well you carry out your usual social activities and roles    Fair Poor Poor   To what extent are you able to carry out your everyday physical activities such as walking, climbing stairs, carrying groceries, or moving a chair?    A little Moderately A little   In the past 7 days, how often have you been bothered by emotional problems such as feeling anxious, depressed, or irritable?    Always Always Always   In the past 7 days, how would you rate your fatigue on average?    Moderate Severe Severe   In the past 7 days, how would you rate your pain on average, where 0 means no pain, and 10 means worst imaginable pain?    8 7 7   In general, would you say your health is:    1    1 1 2   In general, would you say your quality of life is:    1    1 2 1   In general, how would you rate your physical health?    1    1 1 1   In general, how would you rate your mental health, including your mood and your ability to think?    2    2 2 1   In general, how would you rate your satisfaction with your social activities and relationships?    2    2 1 2   In general, please rate how well you carry out your usual social activities and roles. (This includes activities at home, at work and in your community, and responsibilities as a parent, child, spouse, employee, friend,  etc.)    2    2 1 1   To what extent are you able to carry out your everyday physical activities such as walking, climbing stairs, carrying groceries, or moving a chair?    2    2 3 2   In the past 7 days, how often have you been bothered by emotional problems such as feeling anxious, depressed, or irritable?    5    5 5 5   In the past 7 days, how would you rate your fatigue on average?    3    3 4 4   In the past 7 days, how would you rate your pain on average, where 0 means no pain, and 10 means worst imaginable pain?    8    8 7 7   Global Mental Health Score    6    6 6 5   Global Physical Health Score    8    8 8 7   PROMIS TOTAL - SUBSCORES    14    14 14 12       Information is confidential and restricted. Go to Review Flowsheets to unlock data.    Multiple values from one day are sorted in reverse-chronological order     [place] Suicide Severity Rating Scale (Lifetime/Recent)      10/4/2022    11:00 AM   [place] Suicide Severity Rating (Lifetime/Recent)   Q1 Wished to be Dead (Past Month) no   Q2 Suicidal Thoughts (Past Month) no   Q3 Suicidal Thought Method no   Q4 Suicidal Intent without Specific Plan no   Q5 Suicide Intent with Specific Plan yes   Q6 Suicide Behavior (Lifetime) yes   Within the Past 3 Months? no   Level of Risk per Screen high risk         ASSESSMENT: Current Emotional / Mental Status (status of significant symptoms):   Risk status (Self / Other harm or suicidal ideation)   Patient denies current fears or concerns for personal safety.   Patient denies current or recent suicidal ideation or behaviors.   Patient denies current or recent homicidal ideation or behaviors.   Patient denies current or recent self injurious behavior or ideation.   Patient denies other safety concerns.   Patient reports there has been no change in risk factors since their last session.     Patient reports there has been no change in protective factors since their last session.     Recommended that patient call  911 or go to the local ED should there be a change in any of these risk factors.     Appearance:   Appropriate    Eye Contact:   Good    Psychomotor Behavior: Normal    Attitude:   Cooperative    Orientation:   All   Speech    Rate / Production: Normal/ Responsive Normal     Volume:  Normal    Mood:    Normal   Affect:    Appropriate    Thought Content:  Clear    Thought Form:  Coherent  Logical    Insight:    Good      Medication Review:   No changes to current psychiatric medication(s)     Medication Compliance:   Yes     Changes in Health Issues:   None reported     Chemical Use Review:   Substance Use: Chemical use reviewed, no active concerns identified      Tobacco Use: No change in amount of tobacco use since last session.  Patient declined discussion at this time    Diagnosis:  1. Moderate episode of recurrent major depressive disorder (H)    2. Generalized anxiety disorder          Collateral Reports Completed:   Not Applicable    PLAN: (Patient Tasks / Therapist Tasks / Other)  Continue to work on stress reduction skills and communicating with her boyfriend.         Ricarda Bhatia, [facility]                                                         ______________________________________________________________________    Individual Treatment Plan    Patient's Name: Radha Beckwith  YOB: 1975    Date of Creation: 6/28/23  Date Treatment Plan Last Reviewed/Revised: 9/12/23    DSM5 Diagnoses: 296.32 (F33.1) Major Depressive Disorder, Recurrent Episode, Moderate _  Psychosocial / Contextual Factors: living with boyfriend, memory issues  PROMIS (reviewed every 90 days):     Referral / Collaboration:  Referral to another professional/service is not indicated at this time..    Anticipated number of session for this episode of care: 9-12 sessions  Anticipation frequency of session:  as needed  Anticipated Duration of each session: 38-52 minutes  Treatment plan will be reviewed in 90 days or when  goals have been changed.       MeasurableTreatment Goal(s) related to diagnosis / functional impairment(s)  Goal 1: Patient will increase communication skills with family members.    Objective #A (Patient Action)    Patient will learn & utilize at least 2 assertive communication skills weekly.  Status: Continued - Date(s):9/12/23     Intervention(s)  Therapist will teach emotional regulation skills. distress tolerance skills, interpersonal effectiveness skills, emotion regluation skills, mindfulness skills, radical acceptance. Therapist will teach client how to ID body cues for anxiety, anxiety reduction techniques, how to ID triggers for depression and anxiety- decrease reactivity/eliminate, lifestyle changes to reduce depression and anxiety, communication skills, explore cognitive beliefs and help client to develop healthy cognitive patterns and beliefs.    Objective #B  Patient will use thought-stopping strategy daily to reduce intrusive thoughts.  Status: Continued - Date(s):9/12/23     Intervention(s)  Therapist will teach emotional regulation skills. distress tolerance skills, interpersonal effectiveness skills, emotion regluation skills, mindfulness skills, radical acceptance. Therapist will teach client how to ID body cues for anxiety, anxiety reduction techniques, how to ID triggers for depression and anxiety- decrease reactivity/eliminate, lifestyle changes to reduce depression and anxiety, communication skills, explore cognitive beliefs and help client to develop healthy cognitive patterns and beliefs.      Goal 2: Patient will decrease depression symptoms.      Objective #A (Patient Action)    Status: Continued - Date(s):9/12/23     Patient will Increase interest, engagement, and pleasure in doing things  Decrease frequency and intensity of feeling down, depressed, hopeless  Improve quantity and quality of night time sleep / decrease daytime naps  Feel less tired and more energy during the day   Improve  diet, appetite, mindful eating, and / or meal planning  Identify negative self-talk and behaviors: challenge core beliefs, myths, and actions  Improve concentration, focus, and mindfulness in daily activities   Feel less fidgety, restless or slow in daily activities / interpersonal interactions.    Intervention(s)  Therapist will teach emotional regulation skills. distress tolerance skills, interpersonal effectiveness skills, emotion regluation skills, mindfulness skills, radical acceptance. Therapist will teach client how to ID body cues for anxiety, anxiety reduction techniques, how to ID triggers for depression and anxiety- decrease reactivity/eliminate, lifestyle changes to reduce depression and anxiety, communication skills, explore cognitive beliefs and help client to develop healthy cognitive patterns and beliefs.    Objective #B  Patient will identify three distraction and diversion activities and use those activities to decrease level of anxiety  .    Status: Continued - Date(s):  9/12/23     Intervention(s)  Therapist will teach emotional regulation skills. distress tolerance skills, interpersonal effectiveness skills, emotion regluation skills, mindfulness skills, radical acceptance. Therapist will teach client how to ID body cues for anxiety, anxiety reduction techniques, how to ID triggers for depression and anxiety- decrease reactivity/eliminate, lifestyle changes to reduce depression and anxiety, communication skills, explore cognitive beliefs and help client to develop healthy cognitive patterns and beliefs.      Patient has reviewed and agreed to the above plan.      Ricarda Bhatia [facility]

## 2023-10-25 ENCOUNTER — THERAPY VISIT (OUTPATIENT)
Dept: PHYSICAL THERAPY | Facility: CLINIC | Age: 48
End: 2023-10-25
Payer: COMMERCIAL

## 2023-10-25 DIAGNOSIS — M54.50 CHRONIC MIDLINE LOW BACK PAIN WITHOUT SCIATICA: ICD-10-CM

## 2023-10-25 DIAGNOSIS — M54.2 CHRONIC NECK PAIN: Primary | ICD-10-CM

## 2023-10-25 DIAGNOSIS — G89.29 CHRONIC NECK PAIN: Primary | ICD-10-CM

## 2023-10-25 DIAGNOSIS — M54.2 CHRONIC NECK PAIN: ICD-10-CM

## 2023-10-25 DIAGNOSIS — G89.29 CHRONIC NECK PAIN: ICD-10-CM

## 2023-10-25 DIAGNOSIS — G89.29 CHRONIC MIDLINE LOW BACK PAIN WITHOUT SCIATICA: ICD-10-CM

## 2023-10-25 PROCEDURE — 97112 NEUROMUSCULAR REEDUCATION: CPT | Mod: GP | Performed by: PHYSICAL THERAPIST

## 2023-10-25 RX ORDER — CYCLOBENZAPRINE HCL 5 MG
5-10 TABLET ORAL 3 TIMES DAILY PRN
Qty: 90 TABLET | Refills: 5 | Status: SHIPPED | OUTPATIENT
Start: 2023-10-25 | End: 2024-02-20

## 2023-10-25 NOTE — TELEPHONE ENCOUNTER
Patient is out of medication, is hoping to  Rx today as she has an appointment with Physical Therapy today.    Pretty Martinez XRO/

## 2023-10-30 ASSESSMENT — ANXIETY QUESTIONNAIRES
4. TROUBLE RELAXING: SEVERAL DAYS
IF YOU CHECKED OFF ANY PROBLEMS ON THIS QUESTIONNAIRE, HOW DIFFICULT HAVE THESE PROBLEMS MADE IT FOR YOU TO DO YOUR WORK, TAKE CARE OF THINGS AT HOME, OR GET ALONG WITH OTHER PEOPLE: EXTREMELY DIFFICULT
6. BECOMING EASILY ANNOYED OR IRRITABLE: NEARLY EVERY DAY
2. NOT BEING ABLE TO STOP OR CONTROL WORRYING: NEARLY EVERY DAY
5. BEING SO RESTLESS THAT IT IS HARD TO SIT STILL: MORE THAN HALF THE DAYS
GAD7 TOTAL SCORE: 18
8. IF YOU CHECKED OFF ANY PROBLEMS, HOW DIFFICULT HAVE THESE MADE IT FOR YOU TO DO YOUR WORK, TAKE CARE OF THINGS AT HOME, OR GET ALONG WITH OTHER PEOPLE?: EXTREMELY DIFFICULT
7. FEELING AFRAID AS IF SOMETHING AWFUL MIGHT HAPPEN: NEARLY EVERY DAY
GAD7 TOTAL SCORE: 18
3. WORRYING TOO MUCH ABOUT DIFFERENT THINGS: NEARLY EVERY DAY
1. FEELING NERVOUS, ANXIOUS, OR ON EDGE: NEARLY EVERY DAY
7. FEELING AFRAID AS IF SOMETHING AWFUL MIGHT HAPPEN: NEARLY EVERY DAY

## 2023-10-30 ASSESSMENT — PATIENT HEALTH QUESTIONNAIRE - PHQ9: SUM OF ALL RESPONSES TO PHQ QUESTIONS 1-9: 14

## 2023-10-31 ENCOUNTER — VIRTUAL VISIT (OUTPATIENT)
Dept: PSYCHOLOGY | Facility: OTHER | Age: 48
End: 2023-10-31
Payer: COMMERCIAL

## 2023-10-31 DIAGNOSIS — M53.3 SI (SACROILIAC) JOINT DYSFUNCTION: Primary | ICD-10-CM

## 2023-10-31 DIAGNOSIS — F33.1 MODERATE EPISODE OF RECURRENT MAJOR DEPRESSIVE DISORDER (H): Primary | ICD-10-CM

## 2023-10-31 DIAGNOSIS — F41.1 GENERALIZED ANXIETY DISORDER: ICD-10-CM

## 2023-10-31 PROCEDURE — 90834 PSYTX W PT 45 MINUTES: CPT | Mod: VID | Performed by: COUNSELOR

## 2023-10-31 ASSESSMENT — PATIENT HEALTH QUESTIONNAIRE - PHQ9
SUM OF ALL RESPONSES TO PHQ QUESTIONS 1-9: 14
10. IF YOU CHECKED OFF ANY PROBLEMS, HOW DIFFICULT HAVE THESE PROBLEMS MADE IT FOR YOU TO DO YOUR WORK, TAKE CARE OF THINGS AT HOME, OR GET ALONG WITH OTHER PEOPLE: EXTREMELY DIFFICULT

## 2023-10-31 ASSESSMENT — ANXIETY QUESTIONNAIRES: GAD7 TOTAL SCORE: 18

## 2023-10-31 NOTE — PROGRESS NOTES
Answers submitted by the patient for this visit:  Patient Health Questionnaire (Submitted on 10/31/2023)  If you checked off any problems, how difficult have these problems made it for you to do your work, take care of things at home, or get along with other people?: Extremely difficult  PHQ9 TOTAL SCORE: 14  BO-7 (Submitted on 10/31/2023)  BO 7 TOTAL SCORE: 18            [facility]                                     Progress Note    Patient Name: Radha Beckwith  Date: 10/31/23         Service Type: Individual      Session Start Time: 3:10pm Session End Time: 4:00pm     Session Length: 50    Session #: 13    Attendees: Client    Service Modality:  Video Visit:      Provider verified identity through the following two step process.  Patient provided:  Patient is known previously to provider    Telemedicine Visit: The patient's condition can be safely assessed and treated via synchronous audio and visual telemedicine encounter.      Reason for Telemedicine Visit: Patient convenience (e.g. access to timely appointments / distance to available provider)    Originating Site (Patient Location): Patient's home    Distant Site (Provider Location): Provider Remote Setting- Home Office    Consent:  The patient/guardian has verbally consented to: the potential risks and benefits of telemedicine (video visit) versus in person care; bill my insurance or make self-payment for services provided; and responsibility for payment of non-covered services.     Patient would like the video invitation sent by:  My Chart    Mode of Communication:  Video Conference via [facility]    Distant Location (Provider):  On-site    As the provider I attest to compliance with applicable laws and regulations related to telemedicine.    DATA  Interactive Complexity: No  Crisis: No        Progress Since Last Session (Related to Symptoms / Goals / Homework):   Symptoms: No change .    Homework: Completed in session      Episode of  Care Goals: Minimal progress - ACTION (Actively working towards change); Intervened by reinforcing change plan / affirming steps taken     Current / Ongoing Stressors and Concerns:   The client stated she and her boyfriend got the chance to talk a little. She stated she told him how hurt she felt and she stated he did apologize to her. She stated since then it's been a lot better of a week together.   Feels like her medications are messing with her memory. She stated too that she's been sleeping so much more because of the medication increase. Has her injections coming up. Considering medical marijuana.        Treatment Objective(s) Addressed in This Session:   Increase interest, engagement, and pleasure in doing things  Feel less tired and more energy during the day        Intervention:   Talked about how she can continue to be effective with her boyfriend. Talked about the pain she's experiencing and how she feels certain things haven't been working to help her heal. Empathized with her and validated her frustrations. Encouraged her to continue to talk with her doctors about her concerns.      Assessments completed prior to visit:  The following assessments were completed by patient for this visit:  PHQ9:       8/9/2023    12:54 PM 8/10/2023    12:46 PM 9/4/2023    10:00 PM 9/19/2023     9:15 AM 9/25/2023    10:18 AM 10/24/2023    11:19 AM 10/30/2023    12:22 PM   PHQ-9 SCORE   PHQ-9 Total Score MyChart 22 (Severe depression) 23 (Severe depression) 18 (Moderately severe depression) 17 (Moderately severe depression) 18 (Moderately severe depression) 15 (Moderately severe depression) 14 (Moderate depression)   PHQ-9 Total Score 22 23 18 17 18 15 14     GAD7:       5/10/2023    12:51 PM 6/12/2023    10:19 AM 8/1/2023    12:15 PM 9/4/2023    10:01 PM 9/19/2023     9:19 AM 10/4/2023    12:32 PM 10/30/2023    12:23 PM   BO-7 SCORE   Total Score 17 (severe anxiety) 19 (severe anxiety) 19 (severe anxiety) 19 (severe  anxiety) 18 (severe anxiety) 19 (severe anxiety) 18 (severe anxiety)   Total Score 17 19    19 19 19 18    18 19 18    18     PROMIS 10-Global Health (all questions and answers displayed):       10/26/2022    12:50 PM 1/25/2023    12:48 PM 2/7/2023    12:57 PM 4/17/2023     2:12 PM 6/25/2023     2:45 PM 8/10/2023    12:50 PM   PROMIS 10   In general, would you say your health is:    Poor Poor Fair   In general, would you say your quality of life is:    Poor Fair Poor   In general, how would you rate your physical health?    Poor Poor Poor   In general, how would you rate your mental health, including your mood and your ability to think?    Fair Fair Poor   In general, how would you rate your satisfaction with your social activities and relationships?    Fair Poor Fair   In general, please rate how well you carry out your usual social activities and roles    Fair Poor Poor   To what extent are you able to carry out your everyday physical activities such as walking, climbing stairs, carrying groceries, or moving a chair?    A little Moderately A little   In the past 7 days, how often have you been bothered by emotional problems such as feeling anxious, depressed, or irritable?    Always Always Always   In the past 7 days, how would you rate your fatigue on average?    Moderate Severe Severe   In the past 7 days, how would you rate your pain on average, where 0 means no pain, and 10 means worst imaginable pain?    8 7 7   In general, would you say your health is:    1    1 1 2   In general, would you say your quality of life is:    1    1 2 1   In general, how would you rate your physical health?    1    1 1 1   In general, how would you rate your mental health, including your mood and your ability to think?    2    2 2 1   In general, how would you rate your satisfaction with your social activities and relationships?    2    2 1 2   In general, please rate how well you carry out your usual social activities and roles.  (This includes activities at home, at work and in your community, and responsibilities as a parent, child, spouse, employee, friend, etc.)    2    2 1 1   To what extent are you able to carry out your everyday physical activities such as walking, climbing stairs, carrying groceries, or moving a chair?    2    2 3 2   In the past 7 days, how often have you been bothered by emotional problems such as feeling anxious, depressed, or irritable?    5    5 5 5   In the past 7 days, how would you rate your fatigue on average?    3    3 4 4   In the past 7 days, how would you rate your pain on average, where 0 means no pain, and 10 means worst imaginable pain?    8    8 7 7   Global Mental Health Score    6    6 6 5   Global Physical Health Score    8    8 8 7   PROMIS TOTAL - SUBSCORES    14    14 14 12       Information is confidential and restricted. Go to Review Flowsheets to unlock data.    Multiple values from one day are sorted in reverse-chronological order     [place] Suicide Severity Rating Scale (Lifetime/Recent)      10/4/2022    11:00 AM   [place] Suicide Severity Rating (Lifetime/Recent)   Q1 Wished to be Dead (Past Month) no   Q2 Suicidal Thoughts (Past Month) no   Q3 Suicidal Thought Method no   Q4 Suicidal Intent without Specific Plan no   Q5 Suicide Intent with Specific Plan yes   Q6 Suicide Behavior (Lifetime) yes   Within the Past 3 Months? no   Level of Risk per Screen high risk         ASSESSMENT: Current Emotional / Mental Status (status of significant symptoms):   Risk status (Self / Other harm or suicidal ideation)   Patient denies current fears or concerns for personal safety.   Patient denies current or recent suicidal ideation or behaviors.   Patient denies current or recent homicidal ideation or behaviors.   Patient denies current or recent self injurious behavior or ideation.   Patient denies other safety concerns.   Patient reports there has been no change in risk factors since their last  session.     Patient reports there has been no change in protective factors since their last session.     Recommended that patient call 911 or go to the local ED should there be a change in any of these risk factors.     Appearance:   Appropriate    Eye Contact:   Good    Psychomotor Behavior: Normal    Attitude:   Cooperative    Orientation:   All   Speech    Rate / Production: Normal/ Responsive Normal     Volume:  Normal    Mood:    Normal   Affect:    Appropriate    Thought Content:  Clear    Thought Form:  Coherent  Logical    Insight:    Good      Medication Review:   No changes to current psychiatric medication(s)     Medication Compliance:   Yes     Changes in Health Issues:   None reported     Chemical Use Review:   Substance Use: Chemical use reviewed, no active concerns identified      Tobacco Use: No change in amount of tobacco use since last session.  Patient declined discussion at this time    Diagnosis:  1. Moderate episode of recurrent major depressive disorder (H)    2. Generalized anxiety disorder          Collateral Reports Completed:   Not Applicable    PLAN: (Patient Tasks / Therapist Tasks / Other)  Continue to work on stress reduction skills and communicating with her boyfriend.         Ricarda Bhatia, [facility]                                                         ______________________________________________________________________    Individual Treatment Plan    Patient's Name: Radha Beckwith  YOB: 1975    Date of Creation: 6/28/23  Date Treatment Plan Last Reviewed/Revised: 9/12/23    DSM5 Diagnoses: 296.32 (F33.1) Major Depressive Disorder, Recurrent Episode, Moderate _  Psychosocial / Contextual Factors: living with boyfriend, memory issues  PROMIS (reviewed every 90 days):     Referral / Collaboration:  Referral to another professional/service is not indicated at this time..    Anticipated number of session for this episode of care: 9-12 sessions  Anticipation  frequency of session:  as needed  Anticipated Duration of each session: 38-52 minutes  Treatment plan will be reviewed in 90 days or when goals have been changed.       MeasurableTreatment Goal(s) related to diagnosis / functional impairment(s)  Goal 1: Patient will increase communication skills with family members.    Objective #A (Patient Action)    Patient will learn & utilize at least 2 assertive communication skills weekly.  Status: Continued - Date(s):9/12/23     Intervention(s)  Therapist will teach emotional regulation skills. distress tolerance skills, interpersonal effectiveness skills, emotion regluation skills, mindfulness skills, radical acceptance. Therapist will teach client how to ID body cues for anxiety, anxiety reduction techniques, how to ID triggers for depression and anxiety- decrease reactivity/eliminate, lifestyle changes to reduce depression and anxiety, communication skills, explore cognitive beliefs and help client to develop healthy cognitive patterns and beliefs.    Objective #B  Patient will use thought-stopping strategy daily to reduce intrusive thoughts.  Status: Continued - Date(s):9/12/23     Intervention(s)  Therapist will teach emotional regulation skills. distress tolerance skills, interpersonal effectiveness skills, emotion regluation skills, mindfulness skills, radical acceptance. Therapist will teach client how to ID body cues for anxiety, anxiety reduction techniques, how to ID triggers for depression and anxiety- decrease reactivity/eliminate, lifestyle changes to reduce depression and anxiety, communication skills, explore cognitive beliefs and help client to develop healthy cognitive patterns and beliefs.      Goal 2: Patient will decrease depression symptoms.      Objective #A (Patient Action)    Status: Continued - Date(s):9/12/23     Patient will Increase interest, engagement, and pleasure in doing things  Decrease frequency and intensity of feeling down, depressed,  hopeless  Improve quantity and quality of night time sleep / decrease daytime naps  Feel less tired and more energy during the day   Improve diet, appetite, mindful eating, and / or meal planning  Identify negative self-talk and behaviors: challenge core beliefs, myths, and actions  Improve concentration, focus, and mindfulness in daily activities   Feel less fidgety, restless or slow in daily activities / interpersonal interactions.    Intervention(s)  Therapist will teach emotional regulation skills. distress tolerance skills, interpersonal effectiveness skills, emotion regluation skills, mindfulness skills, radical acceptance. Therapist will teach client how to ID body cues for anxiety, anxiety reduction techniques, how to ID triggers for depression and anxiety- decrease reactivity/eliminate, lifestyle changes to reduce depression and anxiety, communication skills, explore cognitive beliefs and help client to develop healthy cognitive patterns and beliefs.    Objective #B  Patient will identify three distraction and diversion activities and use those activities to decrease level of anxiety  .    Status: Continued - Date(s):  9/12/23     Intervention(s)  Therapist will teach emotional regulation skills. distress tolerance skills, interpersonal effectiveness skills, emotion regluation skills, mindfulness skills, radical acceptance. Therapist will teach client how to ID body cues for anxiety, anxiety reduction techniques, how to ID triggers for depression and anxiety- decrease reactivity/eliminate, lifestyle changes to reduce depression and anxiety, communication skills, explore cognitive beliefs and help client to develop healthy cognitive patterns and beliefs.      Patient has reviewed and agreed to the above plan.      Ricarda Bhatia [facility]

## 2023-10-31 NOTE — TELEPHONE ENCOUNTER
Dr. Juhi doss to proceed to sacroiliac joint injection? Pt had cervical epidural steroid injection with Dr. Iovry on 10/20/23.

## 2023-11-02 ENCOUNTER — RADIOLOGY INJECTION OFFICE VISIT (OUTPATIENT)
Dept: PALLIATIVE MEDICINE | Facility: CLINIC | Age: 48
End: 2023-11-02
Payer: COMMERCIAL

## 2023-11-02 VITALS — HEART RATE: 82 BPM | DIASTOLIC BLOOD PRESSURE: 92 MMHG | SYSTOLIC BLOOD PRESSURE: 130 MMHG | OXYGEN SATURATION: 99 %

## 2023-11-02 DIAGNOSIS — M53.3 SI (SACROILIAC) JOINT DYSFUNCTION: Primary | ICD-10-CM

## 2023-11-02 PROCEDURE — 27096 INJECT SACROILIAC JOINT: CPT | Mod: 50 | Performed by: PAIN MEDICINE

## 2023-11-02 RX ADMIN — TRIAMCINOLONE ACETONIDE 40 MG: 40 INJECTION, SUSPENSION INTRA-ARTICULAR; INTRAMUSCULAR at 09:54

## 2023-11-02 ASSESSMENT — PAIN SCALES - GENERAL
PAINLEVEL: SEVERE PAIN (6)
PAINLEVEL: SEVERE PAIN (7)

## 2023-11-02 NOTE — NURSING NOTE
Discharge Information    IV Discontiued Time:  NA    Amount of Fluid Infused:  NA    Discharge Criteria = When patient returns to baseline or as per MD order    Consciousness:  Pt is fully awake    Circulation:  BP +/- 20% of pre-procedure level    Respiration:  Patient is able to breathe deeply    O2 Sat:  Patient is able to maintain O2 Sat >92% on room air    Activity:  Moves 4 extremities on command    Ambulation:  Patient is able to stand and walk or stand and pivot into wheelchair    Dressing:  Clean/dry or No Dressing    Notes:   Discharge instructions and AVS given to patient    Patient meets criteria for discharge?  YES    Admitted to PCU?  No    Responsible adult present to accompany patient home?  Yes    Signature/Title:    Reema Gaffney RN  RN Care Coordinator  [facility] [place]

## 2023-11-02 NOTE — NURSING NOTE
Pre-procedure Intake  If YES to any questions or NO to having a   Please complete laminated checklist and leave on the computer keyboard for Provider, verbally inform provider if able.    For SCS Trial, RFA's or any sedation procedure:  Have you been fasting? NA  If yes, for how long?     Are you taking any any blood thinners such as Coumadin, Warfarin, Jantoven, Pradaxa Xarelto, Eliquis, Edoxaban, Enoxaparin, Lovenox, Heparin, Arixtra, Fondaparinux, or Fragmin? OR Antiplatelet medication such as Plavix, Brilinta, or Effient?   No   If yes, when did you take your last dose?     Do you take aspirin?  No  If cervical procedure, have you held aspirin for 6 days?       Do you have any allergies to contrast dye, iodine, steroid and/or numbing medications?  NO    Are you currently taking antibiotics or have an active infection?  NO    Have you had a fever/elevated temperature within the past week? NO    Are you currently taking oral steroids? NO    Do you have a ? Yes    Are you pregnant or breastfeeding?  NO    Have you received the COVID-19 vaccine? Yes   If yes, was it your 1st, 2nd or only dose needed?   Date of most recent vaccine: 2/21/22    Notify provider and RNs if systolic BP >170, diastolic BP >100, P >100 or O2 sats < 90%

## 2023-11-08 DIAGNOSIS — F51.04 PSYCHOPHYSIOLOGICAL INSOMNIA: ICD-10-CM

## 2023-11-08 RX ORDER — TRIAMCINOLONE ACETONIDE 40 MG/ML
40 INJECTION, SUSPENSION INTRA-ARTICULAR; INTRAMUSCULAR ONCE
Status: COMPLETED | OUTPATIENT
Start: 2023-11-02 | End: 2023-11-02

## 2023-11-08 RX ORDER — TRIAMCINOLONE ACETONIDE 40 MG/ML
40 INJECTION, SUSPENSION INTRA-ARTICULAR; INTRAMUSCULAR ONCE
Status: DISCONTINUED | OUTPATIENT
Start: 2023-11-08 | End: 2023-11-08

## 2023-11-08 NOTE — PROGRESS NOTES
Pre procedure Diagnosis: SI joint dysfunction    Post procedure Diagnosis: Same  Procedure performed: bilateral SI joint injection  Anesthesia: none  Complications: none  Operators: King Reynaga MD     Indications:   Radha Beckwith is a 47 year old female. The patient has a history of upper buttocks pain. Other conservative treatments prior to injection include meds/pt.    Options/alternatives, benefits and risks were discussed with the patient including bleeding, infection, tissue trauma, exposure to radiation, reaction to medications including seizure, nerve injury, weakness, and numbness.  Questions were answered to her satisfaction and she agrees to proceed. Voluntary informed consent was obtained and signed.     Vitals were reviewed: Yes  Allergies were reviewed:  Yes   Medications were reviewed:  Yes   Pre-procedure pain score: 7/10    Procedure:  After obtaining signed informed consent, the patient was brought into the procedure suite and was placed in a prone position on the procedure table.   A Pause for the Cause was performed.  The patient was prepped and draped in the usual sterile fashion.     After identifying the bilateral SI joint, the C-arm was rotated obliquely to obtain the best view of the inferior angle of the joint.  Then 4 ml of Lidocaine 1%  was used to anesthetize the skin at a skin entry site coaxial with the fluoroscopy beam at this location.  A 22 gauge 3.5 inch needle was advanced under intermittent fluoroscopy until it was felt to enter the SI joint.  Aspiration was negative.    A total of 1ml of Omnipaque-300 was injected, confirming appropriate position, with spread into the intraarticular space, with no intravascular uptake noted.  9ml of Omnipaque was wasted. Location was verified in lateral view.    2 ml of 0.5% bupivacaine with 40mg of Kenalog was injected.  The needle was removed.     Hemostasis was achieved, the area was cleaned, and bandaids were placed when  appropriate.  The patient tolerated the procedure well, and was taken to the recovery room.  Images were saved to PACS.    Post-procedure pain score: 6/10  Follow-up includes:   -f/u with referring Physician     King Reynaga MD  [facility] [place]

## 2023-11-09 RX ORDER — ZOLPIDEM TARTRATE 5 MG/1
TABLET ORAL
Qty: 30 TABLET | Refills: 0 | Status: SHIPPED | OUTPATIENT
Start: 2023-11-10 | End: 2023-12-06

## 2023-11-12 ENCOUNTER — NURSE TRIAGE (OUTPATIENT)
Dept: NURSING | Facility: CLINIC | Age: 48
End: 2023-11-12
Payer: COMMERCIAL

## 2023-11-12 PROCEDURE — 99284 EMERGENCY DEPT VISIT MOD MDM: CPT | Mod: 25

## 2023-11-12 PROCEDURE — 99284 EMERGENCY DEPT VISIT MOD MDM: CPT | Performed by: FAMILY MEDICINE

## 2023-11-13 ENCOUNTER — VIRTUAL VISIT (OUTPATIENT)
Dept: PSYCHOLOGY | Facility: CLINIC | Age: 48
End: 2023-11-13
Payer: COMMERCIAL

## 2023-11-13 ENCOUNTER — APPOINTMENT (OUTPATIENT)
Dept: CT IMAGING | Facility: CLINIC | Age: 48
End: 2023-11-13
Attending: FAMILY MEDICINE
Payer: COMMERCIAL

## 2023-11-13 ENCOUNTER — HOSPITAL ENCOUNTER (EMERGENCY)
Facility: CLINIC | Age: 48
Discharge: HOME | End: 2023-11-13
Attending: FAMILY MEDICINE | Admitting: FAMILY MEDICINE
Payer: COMMERCIAL

## 2023-11-13 VITALS
BODY MASS INDEX: 30.22 KG/M2 | HEIGHT: 64 IN | OXYGEN SATURATION: 99 % | RESPIRATION RATE: 20 BRPM | SYSTOLIC BLOOD PRESSURE: 133 MMHG | DIASTOLIC BLOOD PRESSURE: 89 MMHG | HEART RATE: 113 BPM | WEIGHT: 177 LBS | TEMPERATURE: 97.9 F

## 2023-11-13 DIAGNOSIS — F41.1 GENERALIZED ANXIETY DISORDER: ICD-10-CM

## 2023-11-13 DIAGNOSIS — F41.9 ANXIETY: ICD-10-CM

## 2023-11-13 DIAGNOSIS — F33.1 MODERATE EPISODE OF RECURRENT MAJOR DEPRESSIVE DISORDER (H): Primary | ICD-10-CM

## 2023-11-13 DIAGNOSIS — M54.12 CERVICAL RADICULOPATHY: ICD-10-CM

## 2023-11-13 PROCEDURE — 90834 PSYTX W PT 45 MINUTES: CPT | Mod: VID | Performed by: COUNSELOR

## 2023-11-13 PROCEDURE — 250N000013 HC RX MED GY IP 250 OP 250 PS 637: Performed by: FAMILY MEDICINE

## 2023-11-13 PROCEDURE — 72125 CT NECK SPINE W/O DYE: CPT

## 2023-11-13 RX ORDER — LIDOCAINE 50 MG/G
1 PATCH TOPICAL EVERY 24 HOURS
Qty: 15 PATCH | Refills: 0 | Status: SHIPPED | OUTPATIENT
Start: 2023-11-13 | End: 2023-11-28

## 2023-11-13 RX ORDER — LIDOCAINE 4 G/G
1 PATCH TOPICAL ONCE
Status: DISCONTINUED | OUTPATIENT
Start: 2023-11-13 | End: 2023-11-13 | Stop reason: HOSPADM

## 2023-11-13 RX ORDER — OXYCODONE HYDROCHLORIDE 5 MG/1
5 TABLET ORAL EVERY 6 HOURS PRN
Qty: 6 TABLET | Refills: 0 | Status: SHIPPED | OUTPATIENT
Start: 2023-11-13 | End: 2023-12-12

## 2023-11-13 RX ORDER — PREDNISONE 20 MG/1
40 TABLET ORAL DAILY
Qty: 10 TABLET | Refills: 0 | Status: SHIPPED | OUTPATIENT
Start: 2023-11-13 | End: 2023-11-18

## 2023-11-13 RX ADMIN — LIDOCAINE 1 PATCH: 4 PATCH TOPICAL at 00:49

## 2023-11-13 ASSESSMENT — PATIENT HEALTH QUESTIONNAIRE - PHQ9
10. IF YOU CHECKED OFF ANY PROBLEMS, HOW DIFFICULT HAVE THESE PROBLEMS MADE IT FOR YOU TO DO YOUR WORK, TAKE CARE OF THINGS AT HOME, OR GET ALONG WITH OTHER PEOPLE: EXTREMELY DIFFICULT
SUM OF ALL RESPONSES TO PHQ QUESTIONS 1-9: 24
SUM OF ALL RESPONSES TO PHQ QUESTIONS 1-9: 24

## 2023-11-13 ASSESSMENT — ENCOUNTER SYMPTOMS
NECK PAIN: 1
NUMBNESS: 1
BACK PAIN: 1
FEVER: 0

## 2023-11-13 ASSESSMENT — ACTIVITIES OF DAILY LIVING (ADL): ADLS_ACUITY_SCORE: 35

## 2023-11-13 NOTE — ED TRIAGE NOTES
Pt states that last evening she suddenly got a pain/burning sensation in her neck. Since this, pt has had weakness, pain and numbness down her spine and into her legs.      Triage Assessment (Adult)       Row Name 11/13/23 0004          Triage Assessment    Airway WDL WDL        Respiratory WDL    Respiratory WDL WDL        Skin Circulation/Temperature WDL    Skin Circulation/Temperature WDL WDL        Cardiac WDL    Cardiac WDL WDL        Peripheral/Neurovascular WDL    Peripheral Neurovascular WDL WDL        Cognitive/Neuro/Behavioral WDL    Cognitive/Neuro/Behavioral WDL WDL

## 2023-11-13 NOTE — ED PROVIDER NOTES
"  History     Chief Complaint   Patient presents with    Neck Pain     HPI  Radha Beckwith is a 47 year old female who presents to the ER with concerns about right-sided neck pain that started suddenly Friday evening at about 6:00.  She denied any fall or injury as cause of the pain.  She has a history for chronic neck and back pain issues but has had multiple recent treatments and imaging studies on her back and neck.  Patient states this pain is nothing like she has had in the past.  It radiates into her right shoulder area and down her right arm causing a numbness tingling sensation of the arm.  She also states it travels down her spine into her right leg somewhat.  She denies any fever or chills.  She denies any fall or injury.  She denies any incontinence of bowel or bladder.  She denies any skin rash to the area of pain.    I did query the [place] controlled substance database. The patient was listed as having filled multiple controlled prescriptions in the last year but it does not appear she has had any escalation in use in the last 6 months..       I reviewed the following clinic phone nurse triage note:    Linda Epstein RN     MB    11/12/23 10:46 PM  Note  Radha is calling about Neck pain that started last night and is different than she has experience in the past.     After a day of riding in a car yesterday, she experienced what she described as sudden \"knots\" in her neck, (she estimates C6)     She tried massaging the area.   Through the night and all through the day today, she's had     - Burning pain from bottom of neck into shoulders, arms, hands  - Ringing in ears  - Head feels warm - no fever  - Radiating down spine - arm/hands are warm and tingling - \"like falling asleep\"  - Radiates down legs to feet - Rt > Lt     Tried:   - Tens unit today during the day - seemed to help  - Heat on back & shoulder/neck area - no help  - Ice packs intermittently - no help  - Heated back massager " - no help  - Muscle relaxers - no help  - Acetaminophen - no help     ER advised - [facility]  She will need to call for a ride     Linda Epstein RN  [facility]        Reviewed recent MRI scan study results of her cervical, thoracic, and lumbar spine and copied the radiology interpretation notes below:  Study Result    Narrative & Impression   MRI OF THE CERVICAL SPINE WITHOUT CONTRAST 9/26/2023 1:38 PM     COMPARISON: CT cervical spine 9/7/2023.     HISTORY: Neck pain.     TECHNIQUE: Multiplanar, multisequence MRI images of the cervical spine  were acquired without intravenous contrast.     FINDINGS: There is normal alignment of the cervical vertebrae.  Vertebral body heights of the cervical spine are normal. Marrow signal  throughout the cervical vertebrae is within normal limits. There is no  evidence for fracture or pathologic bony lesion of the cervical spine.        There is loss of disc height, disc desiccation and posterior disc  bulging to varying degrees at all levels of the cervical spine with  exception of the normal appearing C2-C3 and C7-T1 discs.      The cervical spinal cord is normal in contour. There is no abnormal  signal in the cervical spinal cord. There is no evidence for  intrathecal abnormality.     Level by level:     C2-C3: Normal disc height and contour. Normal facets. No spinal canal  stenosis. No neural foraminal stenosis on either side.      C3-C4: There is facet arthropathy bilaterally, uncinate hypertrophy  bilaterally and a posterior broad-based disc-osteophyte complex. No  spinal canal stenosis. No foraminal stenosis on either side.     C4-C5: There is facet arthropathy bilaterally, uncinate hypertrophy  bilaterally and a posterior broad-based disc-osteophyte complex. No  spinal canal stenosis. Moderate right foraminal stenosis. Mild left  foraminal narrowing.     C5-C6: There is facet arthropathy bilaterally, uncinate hypertrophy  bilaterally and  a posterior broad-based disc-osteophyte complex. No  spinal canal stenosis. No left foraminal stenosis. Mild right  foraminal narrowing.     C6-C7: There is facet arthropathy bilaterally, uncinate hypertrophy  bilaterally and a posterior broad-based disc-osteophyte complex. No  spinal canal stenosis. No foraminal stenosis on either side.     C7-T1: Normal disc height and contour. Normal facets. No spinal canal  stenosis. No neural foraminal stenosis on either side.                                                                       IMPRESSION:  1. Diffuse degenerative change of the cervical spine as detailed  above.  2. No spinal canal stenosis of the cervical spine.  3. Moderate neural foraminal stenosis on the right at C4-C5. No other  high-grade neural foraminal narrowing of the cervical spine.       MELONY DRIVER MD        Study Result    Narrative & Impression   MRI OF THE THORACIC SPINE WITHOUT CONTRAST  9/26/2023 1:38 PM      COMPARISON: CT thoracic spine 9/7/2023.     HISTORY: Acute bilateral thoracic back pain.     TECHNIQUE: Multiplanar, multisequence MRI images of the thoracic spine  were acquired without gadolinium IV contrast.     FINDINGS:  There is normal alignment of the thoracic vertebrae.  Vertebral body heights normal. There is Modic 2 degenerative marrow  signal change in the opposing endplates at T9-T10. Marrow signal  otherwise normal. No evidence for fracture or pathologic bone lesion  of the thoracic spine.     The thoracic intervertebral discs are normal in height, contour and  signal intensity.     The thoracic spinal cord is normal in contour and signal intensity.     No spinal canal or neural foraminal stenosis at any level of the  thoracic spine.                                                                      IMPRESSION:  1. Modic 2 degenerative marrow signal change in the opposing endplates  at T9-T10.  2. Otherwise, normal thoracic spine MRI.       MELONY DRIVER MD      Study  Result    Narrative & Impression   MRI OF THE LUMBAR SPINE WITHOUT CONTRAST 9/26/2023 1:38 PM      COMPARISON: Lumbar spine MRI 3/8/2022.     HISTORY: Lumbar pain.     TECHNIQUE: Multiplanar, multisequence MRI images of the lumbar spine  were acquired without IV contrast.     FINDINGS: There are five lumbar-type vertebrae for the purposes of  this dictation.      There is normal alignment of the lumbar vertebrae. Vertebral body  heights of the lumbar spine are normal. Marrow signal throughout the  lumbar vertebrae is within normal limits. There is no evidence for  fracture or pathologic bony lesion of the lumbar spine.      There is loss of disc height, disc desiccation and posterior disc  bulging/herniation to varying degrees at all levels of the lumbar  spine with the exception of the normal-appearing L1-L2 and L2-L3  discs.      The tip of the conus medullaris is at the L1-L2 level which is within  normal limits. There is no evidence for intrathecal abnormality.      Level by level:      T12-L1: Normal disc height and signal. No herniation. Normal facet  joints. No spinal canal stenosis. No foraminal stenosis on either  side.      L1-L2: Normal disc height and signal. No herniation. Normal facet  joints. No spinal canal stenosis. No foraminal stenosis on either  side.      L2-L3: Normal disc height and signal. No herniation. Normal facet  joints. No spinal canal stenosis. No foraminal stenosis on either  side.      L3-L4: Loss of disc height, disc desiccation and mild circumferential  disc bulging. No herniation. Moderate facet arthropathy bilaterally.  No spinal canal stenosis. No foraminal stenosis on either side.     L4-L5: Loss of disc height, disc desiccation and circumferential disc  bulging with a superimposed left lateral disc herniation (protrusion).  Mild facet arthropathy bilaterally. No spinal canal stenosis. Mild  left foraminal narrowing. No right foraminal stenosis.     L5-S1: Loss of disc height,  disc desiccation and circumferential disc  bulging with a superimposed left paracentral disc herniation  (extrusion). Mild facet arthropathy bilaterally. No spinal canal  stenosis. Mild to moderate left foraminal narrowing. No right  foraminal stenosis.                                                                      IMPRESSION:  1. Degenerative change of the lumbar spine as detailed above without  appreciable change from the comparison study.  2. Left lateral disc herniations at L4-L5 and L5-S1 again noted.  3. Mild to moderate left foraminal narrowing at L5-S1.  4. No other significant spinal canal or neural foraminal narrowing of  the lumbar spine.     MELONY DRIVER MD         Allergies:  Allergies   Allergen Reactions    Ergotamine-Caffeine      12-            GI problems-    Seasonal Allergies     Sumatriptan      vomits after giving herself a shot    Compazine Anxiety    Droperidol Anxiety    Nubain [Nalbuphine Hcl] Anxiety    Prochlorperazine Palpitations     Uncontrolled movement       Problem List:    Patient Active Problem List    Diagnosis Date Noted    Chronic midline low back pain without sciatica 06/15/2023     Priority: Medium    Chronic neck pain 06/15/2023     Priority: Medium    Moderate episode of recurrent major depressive disorder (H) 06/29/2022     Priority: Medium    Supraventricular tachycardia 06/03/2019     Priority: Medium    Psychophysiological insomnia 12/30/2017     Priority: Medium    Chronic bilateral low back pain without sciatica 12/30/2017     Priority: Medium    Opioid dependence in remission (H) 08/02/2015     Priority: Medium     On suboxone treatement with Bryant Harkins.  (Noted in 2015).        Anxiety attack 07/31/2015     Priority: Medium    Hypokalemia 06/23/2015     Priority: Medium    Tobacco abuse 06/23/2015     Priority: Medium    Hyperlipidemia LDL goal <100 01/29/2014     Priority: Medium    Anxiety 08/19/2013     Priority: Medium    GERD  (gastroesophageal reflux disease) 07/28/2010     Priority: Medium     Takes omeprazole and metoclopramide      Restless legs 07/28/2010     Priority: Medium        Past Medical History:    Past Medical History:   Diagnosis Date    Abdominal pain, right lower quadrant 03/09/2008    Anxiety attack 07/31/2015    Atypical chest pain 06/23/2015    De Quervain's disease (tenosynovitis)     Dehydration     Gastric ulcer 07/31/2015    GERD (gastroesophageal reflux disease) 07/28/2010    Ingrowing nail 01/09/2014    Migraines     Opioid dependence in remission (H) 08/02/2015    Other and unspecified ovarian cyst     Papanicolaou smear of cervix with low grade squamous intraepithelial lesion (LGSIL) 07/07/2017       Past Surgical History:    Past Surgical History:   Procedure Laterality Date    BIOPSY CERVICAL, LOCAL EXCISION, SINGLE/MULTIPLE N/A 8/10/2017    Procedure: BIOPSY CERVICAL, LOCAL EXCISION, SINGLE/MULTIPLE;;  Surgeon: Michael Chandler MD;  Location[facility]    COLONOSCOPY N/A 1/6/2023    Procedure: COLONOSCOPY;  Surgeon: Michael Dozier MD;  [facility]: [facility]    COLPOSCOPY, BIOPSY, COMBINED N/A 8/10/2017    Procedure: COMBINED COLPOSCOPY, BIOPSY;  Colposcopy with Cervical Biopsies and Endometrial Biopsy, Exam with Ultrasound;  Surgeon: Michael Chandler MD;  Location: [facility]    ESOPHAGOSCOPY, GASTROSCOPY, DUODENOSCOPY (EGD), COMBINED N/A 4/17/2017    Procedure: COMBINED ESOPHAGOSCOPY, GASTROSCOPY, DUODENOSCOPY (EGD);  Surgeon: Ibrahima Esposito MD;  [facility]    ESOPHAGOSCOPY, GASTROSCOPY, DUODENOSCOPY (EGD), COMBINED N/A 1/6/2023    Procedure: ESOPHAGOGASTRODUODENOSCOPY, WITH BIOPSY;  Surgeon: Michael Dozier MD;  [facility]    ESOPHAGOSCOPY, GASTROSCOPY, DUODENOSCOPY (EGD), COMBINED N/A 10/3/2023    Procedure: ESOPHAGOGASTRODUODENOSCOPY, WITH BIOPSY;  Surgeon: John Paul Varela MD;  [facility]    EXAM UNDER ANESTHESIA PELVIC N/A 8/10/2017    Procedure: EXAM UNDER ANESTHESIA  PELVIC;;  Surgeon: Michael Chandler MD;  [facility]    HYSTERECTOMY      HYSTERECTOMY, PAP NO LONGER INDICATED      INJECT EPIDURAL CERVICAL N/A 10/20/2023    Procedure: Cervical 6-7 Interlaminar epidural steroid injection using fluoroscopic guidance with contrast dye.;  Surgeon: Trevor Ivory MD;  [facility]    INJECT EPIDURAL LUMBAR Bilateral 7/1/2022    Procedure: Lumbar 5-Sacral 1 Transforaminal Epidural Steroid Injection with fluoroscopic guidance and contrast, bilateral;  Surgeon: Trevor Ivory MD;  [facility]    LAPAROSCOPIC CHOLECYSTECTOMY N/A 2/2/2018    Procedure: LAPAROSCOPIC CHOLECYSTECTOMY;  Laparoscopic Cholecystectomy;  Surgeon: Tigre Lowry DO;  [facility]    LAPAROSCOPIC HYSTERECTOMY TOTAL N/A 10/30/2017    Procedure: LAPAROSCOPIC HYSTERECTOMY TOTAL;  LAPAROSCOPIC HYSTERECTOMY TOTAL POSSIBLE SALPINGO-OOPHERECTOMY (BILATERAL);  Surgeon: Michael Chandler MD;  Location: [facility]    ZZHC UGI ENDOSCOPY, SIMPLE EXAM  01/07/08       Family History:    Family History   Problem Relation Age of Onset    Depression Mother     Respiratory Mother     Chronic Obstructive Pulmonary Disease Mother     Cerebrovascular Disease Father         Brain anyeurism    Breast Cancer Cousin     Adrenal Disorder Other     Chronic Obstructive Pulmonary Disease Other        Social History:  Marital Status:  Single [1]  Social History     Tobacco Use    Smoking status: Some Days     Packs/day: 0.25     Years: 20.00     Additional pack years: 0.00     Total pack years: 5.00     Types: Cigarettes    Smokeless tobacco: Never   Vaping Use    Vaping Use: Some days    Substances: Nicotine, CBD    Devices: [employer] tank   Substance Use Topics    Alcohol use: Yes     Comment: occasional drinks; about 5-6 beers/week    Drug use: No        Medications:    lidocaine (LIDODERM) 5 % patch  oxyCODONE (ROXICODONE) 5 MG tablet  predniSONE (DELTASONE) 20 MG tablet  acetaminophen (TYLENOL) 500  "MG tablet  ALPRAZolam (XANAX) 0.5 MG tablet  baclofen (LIORESAL) 20 MG tablet  bisacodyl (DULCOLAX) 5 MG EC tablet  buprenorphine HCl-naloxone HCl (SUBOXONE) 2-0.5 MG per film  cetirizine (ZYRTEC) 10 MG tablet  cyclobenzaprine (FLEXERIL) 5 MG tablet  DULoxetine (CYMBALTA) 30 MG capsule  famotidine (PEPCID) 40 MG tablet  gabapentin (NEURONTIN) 300 MG capsule  lactulose (CHRONULAC) 10 GM/15ML solution  lidocaine (LIDODERM) 5 % patch  ondansetron (ZOFRAN ODT) 4 MG ODT tab  pantoprazole (PROTONIX) 40 MG EC tablet  polyethylene glycol (MIRALAX) 17 GM/Dose powder  rOPINIRole (REQUIP) 1 MG tablet  simvastatin (ZOCOR) 10 MG tablet  traZODone (DESYREL) 50 MG tablet  zolpidem (AMBIEN) 5 MG tablet          Review of Systems   Constitutional:  Negative for fever.   Musculoskeletal:  Positive for back pain and neck pain (right with radiation into right arm).   Neurological:  Positive for numbness (tingling sensation right arm, slightinto rightleg as well).   All other systems reviewed and are negative.      Physical Exam   BP: (!) 150/97  Pulse: 117  Temp: 97.9  F (36.6  C)  Resp: 20  Height: 162.6 cm (5' 4\")  Weight: 80.3 kg (177 lb)  SpO2: 99 %      Physical Exam  Vitals and nursing note reviewed.   Constitutional:       General: She is in acute distress.      Appearance: She is not ill-appearing, toxic-appearing or diaphoretic.   HENT:      Head: Normocephalic and atraumatic.   Eyes:      General: No scleral icterus.     Extraocular Movements: Extraocular movements intact.      Conjunctiva/sclera: Conjunctivae normal.      Pupils: Pupils are equal, round, and reactive to light.   Neck:        Comments: Patient with tenderness with just gentle touch to the skin of the right rhomboid and trapezius muscle area.  There is no evidence for any erythema or skin rash present.  Patient states the pain radiates into her right arm to the forearm.  He also complains of some tingling into the forearm region.  Patient with increased pain " with movement of the right shoulder and arm to the neck.  Compression of the neck was negative for worsening of her symptoms.  No significant tenderness noted to palpation of the thoracic or lumbar spine today.  Patient states that she cannot tolerate even light touch to the skin of the area.  Musculoskeletal:      Cervical back: No rigidity, torticollis or crepitus. Pain with movement, spinous process tenderness and muscular tenderness present.   Neurological:      Mental Status: She is alert.         ED Course                 Procedures              Critical Care time:  none               Results for orders placed or performed during the hospital encounter of 11/13/23 (from the past 24 hour(s))   Cervical spine CT w/o contrast    Narrative    EXAM: CT CERVICAL SPINE W/O CONTRAST  LOCATION: [facility]  DATE: 11/13/2023    INDICATION: acute non traumatic onset of right cervical radiculopathy symptoms   COMPARISON: MRI cervical spine 09/26/2023  TECHNIQUE: Routine CT Cervical Spine without IV contrast. Multiplanar reformats. Dose reduction techniques were used.    FINDINGS:  VERTEBRA: No acute fracture. No acute post traumatic subluxation. Vertebral body heights within normal limits.     CANAL/FORAMINA: Reversal normal lordotic curvature. Mild multilevel spondylosis. Mild bulges. No significant central canal stenosis.  No greater than mild neuroforaminal stenosis. No severe high-grade neural foraminal stenosis. No significant   subluxations.    PARASPINAL: Emphysema.      Impression     IMPRESSION:  1.  No acute fracture.  2.  Mild degenerative changes described above.         Medications   Lidocaine (LIDOCARE) 4 % Patch 1 patch (1 patch Transdermal $Patch/Med Applied 11/13/23 0049)       Assessments & Plan (with Medical Decision Making)  47-year-old female to the ER secondary to concerns of pain and paresthesias to the right upper neck area extending into her right arm and down into  her lower spine.  Patient with a history for chronic neck and back pain issues.  Patient states the symptoms started suddenly while sitting in a chair 2 days ago.  Patient states pain is different than the pain she has had in the past associated with her neck and back chronic pain issues.  Patient is followed by chronic pain and palliative care specialist for her chronic pain issues.  She denied any fever, acute injury, incontinence of bowel or bladder, difficulty breathing or shortness of breath symptoms.  She has no skin rash to the area or open wounds.  Exam findings concerning for cervical radiculopathy into the right arm.  She also had significant paresthesia just with light touch to the skin of the upper back and shoulder region.  A Lidoderm patch was applied across this area for pain control.  CT scan exam was performed as MRIs not available at this time at night in the emergency room.  CT scan findings as noted above.  Patient had some mild improvement in her symptoms with the Lidoderm patch applied to the upper neck and back area.  She was given a prescription for additional Lidoderm patches and prescribed a total of 6 oxycodone tablets and a short course of oral prednisone in hopes of improving the radicular symptomatology.  If not improved over the next 7 to 10 days, then to be rechecked in her clinic as she may need further imaging studies.  She was given signs and symptoms of concern and what would indicate a need to return to the ER.  I also gave her handouts discussing cervical radiculopathy issues.  Patient states that she has an appointment in the near future with a neurosurgeon for evaluation for this condition.  She is encouraged to keep that appointment.     I have reviewed the nursing notes.    I have reviewed the findings, diagnosis, plan and need for follow up with the patient.      Discharge Medication List as of 11/13/2023  2:05 AM        START taking these medications    Details   !!  lidocaine (LIDODERM) 5 % patch Place 1 patch onto the skin every 24 hours for 15 days Patient plans to pay for this prescription with cash if not covered by her insurance.Disp-15 patch, R-0Local Print      oxyCODONE (ROXICODONE) 5 MG tablet Take 1 tablet (5 mg) by mouth every 6 hours as needed for severe pain, Disp-6 tablet, R-0, [facility]      predniSONE (DELTASONE) 20 MG tablet Take 2 tablets (40 mg) by mouth daily for 5 days, Disp-10 tablet, R-0, [facility]       !! - Potential duplicate medications found. Please discuss with provider.               I verbally discussed the findings of the evaluation today in the ER. I have verbally discussed with Radha the suggested treatment(s) as described in the discharge instructions and handouts. I have prescribed the above listed medications and instructed her on appropriate use of these medications.      I have verbally suggested she follow-up in her clinic or return to the ER for increased symptoms. See the follow-up recommendations documented  in the after visit summary in this visit's EPIC chart.      Disclaimer: This note consists of words and symbols derived from keyboarding and dictation using voice recognition software.  As a result, there may be errors that have gone undetected.  Please consider this when interpreting information found in this note.    Final diagnoses:   Cervical radiculopathy - right       11/12/2023   [facility] EMERGENCY DEPT       Yohan Larson, DO  11/13/23 0409       Yohan Larson, DO  11/13/23 0411

## 2023-11-13 NOTE — TELEPHONE ENCOUNTER
"Radha is calling about Neck pain that started last night and is different than she has experience in the past.    After a day of riding in a car yesterday, she experienced what she described as sudden \"knots\" in her neck, (she estimates C6)    She tried massaging the area.   Through the night and all through the day today, she's had    - Burning pain from bottom of neck into shoulders, arms, hands  - Ringing in ears  - Head feels warm - no fever  - Radiating down spine - arm/hands are warm and tingling - \"like falling asleep\"  - Radiates down legs to feet - Rt > Lt    Tried:   - Tens unit today during the day - seemed to help  - Heat on back & shoulder/neck area - no help  - Ice packs intermittently - no help  - Heated back massager - no help  - Muscle relaxers - no help  - Acetaminophen - no help    ER advised - [facility]  She will need to call for a ride    Linda Epstein RN  [facility]      Reason for Disposition   Weakness of an arm or hand    Additional Information   Negative: Shock suspected (e.g., cold/pale/clammy skin, too weak to stand, low BP, rapid pulse)   Negative: Difficult to awaken or acting confused (e.g., disoriented, slurred speech)   Negative: [1] Similar pain previously AND [2] it was from \"heart attack\"   Negative: [1] Similar pain previously AND [2] it was from \"angina\" AND [3] not relieved by nitroglycerin   Negative: Sounds like a life-threatening emergency to the triager   Negative: Difficulty breathing or unusual sweating (e.g., sweating without exertion)   Negative: [1] Stiff neck (can't put chin to chest) AND [2] headache   Negative: [1] Stiff neck (can't put chin to chest) AND [2] fever    Protocols used: Neck Pain or [id number]    "

## 2023-11-13 NOTE — TELEPHONE ENCOUNTER
SPINE PATIENTS - NEW PROTOCOL PREVISIT    RECORDS RECEIVED FROM: Internal   REASON FOR VISIT: Chronic bilateral low back pain with bilateral sciatica    Date of Appt: 12/12/2023   NOTES (FOR ALL VISITS) STATUS DETAILS   OFFICE NOTE from referring provider Internal 09/28/2023 Dr Arteaga [facility]    OFFICE NOTE from other specialist Internal 10/25/2023 PT [facility]   10/04/2023 Dr Landrum [facility]    DISCHARGE SUMMARY from hospital N/A    DISCHARGE REPORT from ER N/A    OPERATIVE REPORT N/A    EMG REPORT N/A    MEDICATION LIST N/A    IMAGING  (FOR ALL VISITS)     MRI (HEAD, NECK, SPINE) Internal 09/26/2023 lumbar spine   XRAY (SPINE) *NEUROSURGERY* N/A    CT (HEAD, NECK, SPINE) N/A

## 2023-11-13 NOTE — PROGRESS NOTES
Answers submitted by the patient for this visit:  Patient Health Questionnaire (Submitted on 11/13/2023)  If you checked off any problems, how difficult have these problems made it for you to do your work, take care of things at home, or get along with other people?: Extremely difficult  PHQ9 TOTAL SCORE: 24          [facility]                                     Progress Note    Patient Name: Radha Beckwith  Date: 11/13/23         Service Type: Individual      Session Start Time: 2:30pm Session End Time: 3:20pm     Session Length: 50    Session #: 14    Attendees: Client    Service Modality:  Video Visit:      Provider verified identity through the following two step process.  Patient provided:  Patient is known previously to provider    Telemedicine Visit: The patient's condition can be safely assessed and treated via synchronous audio and visual telemedicine encounter.      Reason for Telemedicine Visit: Patient convenience (e.g. access to timely appointments / distance to available provider)    Originating Site (Patient Location): Patient's home    Distant Site (Provider Location): Provider Remote Setting- Home Office    Consent:  The patient/guardian has verbally consented to: the potential risks and benefits of telemedicine (video visit) versus in person care; bill my insurance or make self-payment for services provided; and responsibility for payment of non-covered services.     Patient would like the video invitation sent by:  My Chart    Mode of Communication:  Video Conference via [employer]    Distant Location (Provider):  On-site    As the provider I attest to compliance with applicable laws and regulations related to telemedicine.    DATA  Interactive Complexity: No  Crisis: No        Progress Since Last Session (Related to Symptoms / Goals / Homework):   Symptoms: No change .    Homework: Completed in session      Episode of Care Goals: Minimal progress - ACTION (Actively working  towards change); Intervened by reinforcing change plan / affirming steps taken     Current / Ongoing Stressors and Concerns:   The client stated she was in the ER last night because she somehow pinched a nerve.  Turning 48 in a few days and having a really hard time with this. She stated she's been having a lot of memories from when she was younger out of nowhere. She stated nothing really triggers these memories.   Tried to talk to her boyfriend about her feelings and it started well but then he got drunk.        Treatment Objective(s) Addressed in This Session:   Increase interest, engagement, and pleasure in doing things  Feel less tired and more energy during the day        Intervention:   Talked about how she can continue to be effective with her boyfriend. Talked about the pain she's experiencing and how she feels certain things haven't been working to help her heal. Empathized with her and validated her frustrations. Encouraged her to continue to talk with her doctors about her concerns.      Assessments completed prior to visit:  The following assessments were completed by patient for this visit:  PHQ9:       8/10/2023    12:46 PM 9/4/2023    10:00 PM 9/19/2023     9:15 AM 9/25/2023    10:18 AM 10/24/2023    11:19 AM 10/30/2023    12:22 PM 11/13/2023     2:27 PM   PHQ-9 SCORE   PHQ-9 Total Score MyChart 23 (Severe depression) 18 (Moderately severe depression) 17 (Moderately severe depression) 18 (Moderately severe depression) 15 (Moderately severe depression) 14 (Moderate depression) 24 (Severe depression)   PHQ-9 Total Score 23 18 17 18 15 14 24     GAD7:       5/10/2023    12:51 PM 6/12/2023    10:19 AM 8/1/2023    12:15 PM 9/4/2023    10:01 PM 9/19/2023     9:19 AM 10/4/2023    12:32 PM 10/30/2023    12:23 PM   BO-7 SCORE   Total Score 17 (severe anxiety) 19 (severe anxiety) 19 (severe anxiety) 19 (severe anxiety) 18 (severe anxiety) 19 (severe anxiety) 18 (severe anxiety)   Total Score 17 19    19 19 19  18    18 19 18    18     PROMIS 10-Global Health (all questions and answers displayed):       10/26/2022    12:50 PM 1/25/2023    12:48 PM 2/7/2023    12:57 PM 4/17/2023     2:12 PM 6/25/2023     2:45 PM 8/10/2023    12:50 PM 11/10/2023     9:20 PM   PROMIS 10   In general, would you say your health is:    Poor Poor Fair Fair   In general, would you say your quality of life is:    Poor Fair Poor Fair   In general, how would you rate your physical health?    Poor Poor Poor Fair   In general, how would you rate your mental health, including your mood and your ability to think?    Fair Fair Poor Fair   In general, how would you rate your satisfaction with your social activities and relationships?    Fair Poor Fair Fair   In general, please rate how well you carry out your usual social activities and roles    Fair Poor Poor Fair   To what extent are you able to carry out your everyday physical activities such as walking, climbing stairs, carrying groceries, or moving a chair?    A little Moderately A little Moderately   In the past 7 days, how often have you been bothered by emotional problems such as feeling anxious, depressed, or irritable?    Always Always Always Often   In the past 7 days, how would you rate your fatigue on average?    Moderate Severe Severe Severe   In the past 7 days, how would you rate your pain on average, where 0 means no pain, and 10 means worst imaginable pain?    8 7 7 7   In general, would you say your health is:    1    1 1 2 2   In general, would you say your quality of life is:    1    1 2 1 2   In general, how would you rate your physical health?    1    1 1 1 2   In general, how would you rate your mental health, including your mood and your ability to think?    2    2 2 1 2   In general, how would you rate your satisfaction with your social activities and relationships?    2    2 1 2 2   In general, please rate how well you carry out your usual social activities and roles. (This  includes activities at home, at work and in your community, and responsibilities as a parent, child, spouse, employee, friend, etc.)    2    2 1 1 2   To what extent are you able to carry out your everyday physical activities such as walking, climbing stairs, carrying groceries, or moving a chair?    2    2 3 2 3   In the past 7 days, how often have you been bothered by emotional problems such as feeling anxious, depressed, or irritable?    5    5 5 5 4   In the past 7 days, how would you rate your fatigue on average?    3    3 4 4 4   In the past 7 days, how would you rate your pain on average, where 0 means no pain, and 10 means worst imaginable pain?    8    8 7 7 7   Global Mental Health Score    6    6 6 5 8   Global Physical Health Score    8    8 8 7 9   PROMIS TOTAL - SUBSCORES    14    14 14 12 17       Information is confidential and restricted. Go to Review Flowsheets to unlock data.    Multiple values from one day are sorted in reverse-chronological order     [place] Suicide Severity Rating Scale (Lifetime/Recent)      10/4/2022    11:00 AM   [place] Suicide Severity Rating (Lifetime/Recent)   Q1 Wished to be Dead (Past Month) no   Q2 Suicidal Thoughts (Past Month) no   Q3 Suicidal Thought Method no   Q4 Suicidal Intent without Specific Plan no   Q5 Suicide Intent with Specific Plan yes   Q6 Suicide Behavior (Lifetime) yes   Within the Past 3 Months? no   Level of Risk per Screen high risk         ASSESSMENT: Current Emotional / Mental Status (status of significant symptoms):   Risk status (Self / Other harm or suicidal ideation)   Patient denies current fears or concerns for personal safety.   Patient denies current or recent suicidal ideation or behaviors.   Patient denies current or recent homicidal ideation or behaviors.   Patient denies current or recent self injurious behavior or ideation.   Patient denies other safety concerns.   Patient reports there has been no change in risk factors since  their last session.     Patient reports there has been no change in protective factors since their last session.     Recommended that patient call 911 or go to the local ED should there be a change in any of these risk factors.     Appearance:   Appropriate    Eye Contact:   Good    Psychomotor Behavior: Normal    Attitude:   Cooperative    Orientation:   All   Speech    Rate / Production: Normal/ Responsive Normal     Volume:  Normal    Mood:    Normal   Affect:    Appropriate    Thought Content:  Clear    Thought Form:  Coherent  Logical    Insight:    Good      Medication Review:   No changes to current psychiatric medication(s)     Medication Compliance:   Yes     Changes in Health Issues:   None reported     Chemical Use Review:   Substance Use: Chemical use reviewed, no active concerns identified      Tobacco Use: No change in amount of tobacco use since last session.  Patient declined discussion at this time    Diagnosis:  1. Moderate episode of recurrent major depressive disorder (H)    2. Generalized anxiety disorder          Collateral Reports Completed:   Not Applicable    PLAN: (Patient Tasks / Therapist Tasks / Other)  Continue to work on stress reduction skills and communicating with her boyfriend.         Ricarda Bhatia, [facility]                                                         ______________________________________________________________________    Individual Treatment Plan    Patient's Name: Radha Beckwith  YOB: 1975    Date of Creation: 6/28/23  Date Treatment Plan Last Reviewed/Revised: 9/12/23    DSM5 Diagnoses: 296.32 (F33.1) Major Depressive Disorder, Recurrent Episode, Moderate _  Psychosocial / Contextual Factors: living with boyfriend, memory issues  PROMIS (reviewed every 90 days):     Referral / Collaboration:  Referral to another professional/service is not indicated at this time..    Anticipated number of session for this episode of care: 9-12  sessions  Anticipation frequency of session:  as needed  Anticipated Duration of each session: 38-52 minutes  Treatment plan will be reviewed in 90 days or when goals have been changed.       MeasurableTreatment Goal(s) related to diagnosis / functional impairment(s)  Goal 1: Patient will increase communication skills with family members.    Objective #A (Patient Action)    Patient will learn & utilize at least 2 assertive communication skills weekly.  Status: Continued - Date(s):9/12/23     Intervention(s)  Therapist will teach emotional regulation skills. distress tolerance skills, interpersonal effectiveness skills, emotion regluation skills, mindfulness skills, radical acceptance. Therapist will teach client how to ID body cues for anxiety, anxiety reduction techniques, how to ID triggers for depression and anxiety- decrease reactivity/eliminate, lifestyle changes to reduce depression and anxiety, communication skills, explore cognitive beliefs and help client to develop healthy cognitive patterns and beliefs.    Objective #B  Patient will use thought-stopping strategy daily to reduce intrusive thoughts.  Status: Continued - Date(s):9/12/23     Intervention(s)  Therapist will teach emotional regulation skills. distress tolerance skills, interpersonal effectiveness skills, emotion regluation skills, mindfulness skills, radical acceptance. Therapist will teach client how to ID body cues for anxiety, anxiety reduction techniques, how to ID triggers for depression and anxiety- decrease reactivity/eliminate, lifestyle changes to reduce depression and anxiety, communication skills, explore cognitive beliefs and help client to develop healthy cognitive patterns and beliefs.      Goal 2: Patient will decrease depression symptoms.      Objective #A (Patient Action)    Status: Continued - Date(s):9/12/23     Patient will Increase interest, engagement, and pleasure in doing things  Decrease frequency and intensity of  feeling down, depressed, hopeless  Improve quantity and quality of night time sleep / decrease daytime naps  Feel less tired and more energy during the day   Improve diet, appetite, mindful eating, and / or meal planning  Identify negative self-talk and behaviors: challenge core beliefs, myths, and actions  Improve concentration, focus, and mindfulness in daily activities   Feel less fidgety, restless or slow in daily activities / interpersonal interactions.    Intervention(s)  Therapist will teach emotional regulation skills. distress tolerance skills, interpersonal effectiveness skills, emotion regluation skills, mindfulness skills, radical acceptance. Therapist will teach client how to ID body cues for anxiety, anxiety reduction techniques, how to ID triggers for depression and anxiety- decrease reactivity/eliminate, lifestyle changes to reduce depression and anxiety, communication skills, explore cognitive beliefs and help client to develop healthy cognitive patterns and beliefs.    Objective #B  Patient will identify three distraction and diversion activities and use those activities to decrease level of anxiety  .    Status: Continued - Date(s):  9/12/23     Intervention(s)  Therapist will teach emotional regulation skills. distress tolerance skills, interpersonal effectiveness skills, emotion regluation skills, mindfulness skills, radical acceptance. Therapist will teach client how to ID body cues for anxiety, anxiety reduction techniques, how to ID triggers for depression and anxiety- decrease reactivity/eliminate, lifestyle changes to reduce depression and anxiety, communication skills, explore cognitive beliefs and help client to develop healthy cognitive patterns and beliefs.      Patient has reviewed and agreed to the above plan.      Ricarda Bhatia [facility]

## 2023-11-14 RX ORDER — ALPRAZOLAM 0.5 MG
TABLET ORAL
Qty: 60 TABLET | Refills: 0 | Status: SHIPPED | OUTPATIENT
Start: 2023-11-16 | End: 2023-12-14

## 2023-11-15 ENCOUNTER — THERAPY VISIT (OUTPATIENT)
Dept: PHYSICAL THERAPY | Facility: CLINIC | Age: 48
End: 2023-11-15
Payer: COMMERCIAL

## 2023-11-15 DIAGNOSIS — M54.2 CHRONIC NECK PAIN: Primary | ICD-10-CM

## 2023-11-15 DIAGNOSIS — G89.29 CHRONIC NECK PAIN: Primary | ICD-10-CM

## 2023-11-15 DIAGNOSIS — G89.29 CHRONIC MIDLINE LOW BACK PAIN WITHOUT SCIATICA: ICD-10-CM

## 2023-11-15 DIAGNOSIS — M54.50 CHRONIC MIDLINE LOW BACK PAIN WITHOUT SCIATICA: ICD-10-CM

## 2023-11-15 PROCEDURE — 97112 NEUROMUSCULAR REEDUCATION: CPT | Mod: GP | Performed by: PHYSICAL THERAPIST

## 2023-11-19 ASSESSMENT — ANXIETY QUESTIONNAIRES
1. FEELING NERVOUS, ANXIOUS, OR ON EDGE: NEARLY EVERY DAY
5. BEING SO RESTLESS THAT IT IS HARD TO SIT STILL: SEVERAL DAYS
3. WORRYING TOO MUCH ABOUT DIFFERENT THINGS: NEARLY EVERY DAY
GAD7 TOTAL SCORE: 16
2. NOT BEING ABLE TO STOP OR CONTROL WORRYING: NEARLY EVERY DAY
7. FEELING AFRAID AS IF SOMETHING AWFUL MIGHT HAPPEN: NEARLY EVERY DAY
6. BECOMING EASILY ANNOYED OR IRRITABLE: MORE THAN HALF THE DAYS
4. TROUBLE RELAXING: SEVERAL DAYS
IF YOU CHECKED OFF ANY PROBLEMS ON THIS QUESTIONNAIRE, HOW DIFFICULT HAVE THESE PROBLEMS MADE IT FOR YOU TO DO YOUR WORK, TAKE CARE OF THINGS AT HOME, OR GET ALONG WITH OTHER PEOPLE: EXTREMELY DIFFICULT
GAD7 TOTAL SCORE: 16

## 2023-11-19 ASSESSMENT — PATIENT HEALTH QUESTIONNAIRE - PHQ9
10. IF YOU CHECKED OFF ANY PROBLEMS, HOW DIFFICULT HAVE THESE PROBLEMS MADE IT FOR YOU TO DO YOUR WORK, TAKE CARE OF THINGS AT HOME, OR GET ALONG WITH OTHER PEOPLE: EXTREMELY DIFFICULT
SUM OF ALL RESPONSES TO PHQ QUESTIONS 1-9: 20
SUM OF ALL RESPONSES TO PHQ QUESTIONS 1-9: 20

## 2023-11-20 ENCOUNTER — VIRTUAL VISIT (OUTPATIENT)
Dept: PSYCHOLOGY | Facility: CLINIC | Age: 48
End: 2023-11-20
Payer: COMMERCIAL

## 2023-11-20 DIAGNOSIS — F41.1 GENERALIZED ANXIETY DISORDER: ICD-10-CM

## 2023-11-20 DIAGNOSIS — F33.1 MODERATE EPISODE OF RECURRENT MAJOR DEPRESSIVE DISORDER (H): Primary | ICD-10-CM

## 2023-11-20 PROCEDURE — 90834 PSYTX W PT 45 MINUTES: CPT | Mod: VID | Performed by: COUNSELOR

## 2023-11-20 NOTE — PROGRESS NOTES
Answers submitted by the patient for this visit:  Patient Health Questionnaire (Submitted on 11/19/2023)  If you checked off any problems, how difficult have these problems made it for you to do your work, take care of things at home, or get along with other people?: Extremely difficult  PHQ9 TOTAL SCORE: 20  BO-7 (Submitted on 11/19/2023)  BO 7 TOTAL SCORE: 16  Answers submitted by the patient for this visit:  Patient Health Questionnaire (Submitted on 11/13/2023)  If you checked off any problems, how difficult have these problems made it for you to do your work, take care of things at home, or get along with other people?: Extremely difficult  PHQ9 TOTAL SCORE: 24          [facility]                                     Progress Note    Patient Name: Radha Beckwith  Date: 11/20/23         Service Type: Individual      Session Start Time: 1:30pm Session End Time: 2:20pm     Session Length: 50    Session #: 15    Attendees: Client    Service Modality:  Video Visit:      Provider verified identity through the following two step process.  Patient provided:  Patient is known previously to provider    Telemedicine Visit: The patient's condition can be safely assessed and treated via synchronous audio and visual telemedicine encounter.      Reason for Telemedicine Visit: Patient convenience (e.g. access to timely appointments / distance to available provider)    Originating Site (Patient Location): Patient's home    Distant Site (Provider Location): Provider [facility] Setting- Home Office    Consent:  The patient/guardian has verbally consented to: the potential risks and benefits of telemedicine (video visit) versus in person care; bill my insurance or make self-payment for services provided; and responsibility for payment of non-covered services.     Patient would like the video invitation sent by:  My Chart    Mode of Communication:  Video Conference via [employer]    Distant Location (Provider):   On-site    As the provider I attest to compliance with applicable laws and regulations related to telemedicine.    DATA  Interactive Complexity: No  Crisis: No        Progress Since Last Session (Related to Symptoms / Goals / Homework):   Symptoms: No change .    Homework: Completed in session      Episode of Care Goals: Minimal progress - ACTION (Actively working towards change); Intervened by reinforcing change plan / affirming steps taken     Current / Ongoing Stressors and Concerns:   The client stated she   has been feeling better these last few days.   Recently went out with a friend to get food and drinks.   Did a lot of physical things over the weekend because she was feeling well.        Treatment Objective(s) Addressed in This Session:   Increase interest, engagement, and pleasure in doing things  Feel less tired and more energy during the day        Intervention:   Talked about how she can continue to be effective with her boyfriend. Talked about the pain she's experiencing and how she feels certain things haven't been working to help her heal. Empathized with her and validated her frustrations. Encouraged her to continue to talk with her doctors about her concerns.      Assessments completed prior to visit:  The following assessments were completed by patient for this visit:  PHQ9:       9/4/2023    10:00 PM 9/19/2023     9:15 AM 9/25/2023    10:18 AM 10/24/2023    11:19 AM 10/30/2023    12:22 PM 11/13/2023     2:27 PM 11/19/2023     9:30 AM   PHQ-9 SCORE   PHQ-9 Total Score MyChart 18 (Moderately severe depression) 17 (Moderately severe depression) 18 (Moderately severe depression) 15 (Moderately severe depression) 14 (Moderate depression) 24 (Severe depression) 20 (Severe depression)   PHQ-9 Total Score 18 17 18 15 14 24 20     GAD7:       6/12/2023    10:19 AM 8/1/2023    12:15 PM 9/4/2023    10:01 PM 9/19/2023     9:19 AM 10/4/2023    12:32 PM 10/30/2023    12:23 PM 11/19/2023     9:32 AM   BO-7  SCORE   Total Score 19 (severe anxiety) 19 (severe anxiety) 19 (severe anxiety) 18 (severe anxiety) 19 (severe anxiety) 18 (severe anxiety) 16 (severe anxiety)   Total Score 19    19 19 19 18    18 19 18    18 16     PROMIS 10-Global Health (all questions and answers displayed):       1/25/2023    12:48 PM 2/7/2023    12:57 PM 4/17/2023     2:12 PM 6/25/2023     2:45 PM 8/10/2023    12:50 PM 11/10/2023     9:20 PM 11/19/2023     9:34 AM   PROMIS 10   In general, would you say your health is:   Poor Poor Fair Fair Poor   In general, would you say your quality of life is:   Poor Fair Poor Fair Poor   In general, how would you rate your physical health?   Poor Poor Poor Fair Poor   In general, how would you rate your mental health, including your mood and your ability to think?   Fair Fair Poor Fair Poor   In general, how would you rate your satisfaction with your social activities and relationships?   Fair Poor Fair Fair Poor   In general, please rate how well you carry out your usual social activities and roles   Fair Poor Poor Fair Poor   To what extent are you able to carry out your everyday physical activities such as walking, climbing stairs, carrying groceries, or moving a chair?   A little Moderately A little Moderately Mostly   In the past 7 days, how often have you been bothered by emotional problems such as feeling anxious, depressed, or irritable?   Always Always Always Often Always   In the past 7 days, how would you rate your fatigue on average?   Moderate Severe Severe Severe Severe   In the past 7 days, how would you rate your pain on average, where 0 means no pain, and 10 means worst imaginable pain?   8 7 7 7 7   In general, would you say your health is:   1    1 1 2 2 1   In general, would you say your quality of life is:   1    1 2 1 2 1   In general, how would you rate your physical health?   1    1 1 1 2 1   In general, how would you rate your mental health, including your mood and your ability to  think?   2    2 2 1 2 1   In general, how would you rate your satisfaction with your social activities and relationships?   2    2 1 2 2 1   In general, please rate how well you carry out your usual social activities and roles. (This includes activities at home, at work and in your community, and responsibilities as a parent, child, spouse, employee, friend, etc.)   2    2 1 1 2 1   To what extent are you able to carry out your everyday physical activities such as walking, climbing stairs, carrying groceries, or moving a chair?   2    2 3 2 3 4   In the past 7 days, how often have you been bothered by emotional problems such as feeling anxious, depressed, or irritable?   5    5 5 5 4 5   In the past 7 days, how would you rate your fatigue on average?   3    3 4 4 4 4   In the past 7 days, how would you rate your pain on average, where 0 means no pain, and 10 means worst imaginable pain?   8    8 7 7 7 7   Global Mental Health Score   6    6 6 5 8 4   Global Physical Health Score   8    8 8 7 9 9   PROMIS TOTAL - SUBSCORES   14    14 14 12 17 13       Information is confidential and restricted. Go to Review Flowsheets to unlock data.    Multiple values from one day are sorted in reverse-chronological order     [place] Suicide Severity Rating Scale (Lifetime/Recent)      10/4/2022    11:00 AM   [place] Suicide Severity Rating (Lifetime/Recent)   Q1 Wished to be Dead (Past Month) no   Q2 Suicidal Thoughts (Past Month) no   Q3 Suicidal Thought Method no   Q4 Suicidal Intent without Specific Plan no   Q5 Suicide Intent with Specific Plan yes   Q6 Suicide Behavior (Lifetime) yes   Within the Past 3 Months? no   Level of Risk per Screen high risk         ASSESSMENT: Current Emotional / Mental Status (status of significant symptoms):   Risk status (Self / Other harm or suicidal ideation)   Patient denies current fears or concerns for personal safety.   Patient denies current or recent suicidal ideation or  behaviors.   Patient denies current or recent homicidal ideation or behaviors.   Patient denies current or recent self injurious behavior or ideation.   Patient denies other safety concerns.   Patient reports there has been no change in risk factors since their last session.     Patient reports there has been no change in protective factors since their last session.     Recommended that patient call 911 or go to the local ED should there be a change in any of these risk factors.     Appearance:   Appropriate    Eye Contact:   Good    Psychomotor Behavior: Normal    Attitude:   Cooperative    Orientation:   All   Speech    Rate / Production: Normal/ Responsive Normal     Volume:  Normal    Mood:    Normal   Affect:    Appropriate    Thought Content:  Clear    Thought Form:  Coherent  Logical    Insight:    Good      Medication Review:   No changes to current psychiatric medication(s)     Medication Compliance:   Yes     Changes in Health Issues:   None reported     Chemical Use Review:   Substance Use: Chemical use reviewed, no active concerns identified      Tobacco Use: No change in amount of tobacco use since last session.  Patient declined discussion at this time    Diagnosis:  1. Moderate episode of recurrent major depressive disorder (H)    2. Generalized anxiety disorder          Collateral Reports Completed:   Not Applicable    PLAN: (Patient Tasks / Therapist Tasks / Other)  Continue to work on stress reduction skills and communicating with her boyfriend.         Ricarda Bhatia, [facility]                                                         ______________________________________________________________________    Individual Treatment Plan    Patient's Name: Radha Beckwith  YOB: 1975    Date of Creation: 6/28/23  Date Treatment Plan Last Reviewed/Revised: 9/12/23    DSM5 Diagnoses: 296.32 (F33.1) Major Depressive Disorder, Recurrent Episode, Moderate _  Psychosocial / Contextual Factors:  living with boyfriend, memory issues  PROMIS (reviewed every 90 days):     Referral / Collaboration:  Referral to another professional/service is not indicated at this time..    Anticipated number of session for this episode of care: 9-12 sessions  Anticipation frequency of session:  as needed  Anticipated Duration of each session: 38-52 minutes  Treatment plan will be reviewed in 90 days or when goals have been changed.       MeasurableTreatment Goal(s) related to diagnosis / functional impairment(s)  Goal 1: Patient will increase communication skills with family members.    Objective #A (Patient Action)    Patient will learn & utilize at least 2 assertive communication skills weekly.  Status: Continued - Date(s):9/12/23     Intervention(s)  Therapist will teach emotional regulation skills. distress tolerance skills, interpersonal effectiveness skills, emotion regluation skills, mindfulness skills, radical acceptance. Therapist will teach client how to ID body cues for anxiety, anxiety reduction techniques, how to ID triggers for depression and anxiety- decrease reactivity/eliminate, lifestyle changes to reduce depression and anxiety, communication skills, explore cognitive beliefs and help client to develop healthy cognitive patterns and beliefs.    Objective #B  Patient will use thought-stopping strategy daily to reduce intrusive thoughts.  Status: Continued - Date(s):9/12/23     Intervention(s)  Therapist will teach emotional regulation skills. distress tolerance skills, interpersonal effectiveness skills, emotion regluation skills, mindfulness skills, radical acceptance. Therapist will teach client how to ID body cues for anxiety, anxiety reduction techniques, how to ID triggers for depression and anxiety- decrease reactivity/eliminate, lifestyle changes to reduce depression and anxiety, communication skills, explore cognitive beliefs and help client to develop healthy cognitive patterns and beliefs.      Goal  2: Patient will decrease depression symptoms.      Objective #A (Patient Action)    Status: Continued - Date(s):9/12/23     Patient will Increase interest, engagement, and pleasure in doing things  Decrease frequency and intensity of feeling down, depressed, hopeless  Improve quantity and quality of night time sleep / decrease daytime naps  Feel less tired and more energy during the day   Improve diet, appetite, mindful eating, and / or meal planning  Identify negative self-talk and behaviors: challenge core beliefs, myths, and actions  Improve concentration, focus, and mindfulness in daily activities   Feel less fidgety, restless or slow in daily activities / interpersonal interactions.    Intervention(s)  Therapist will teach emotional regulation skills. distress tolerance skills, interpersonal effectiveness skills, emotion regluation skills, mindfulness skills, radical acceptance. Therapist will teach client how to ID body cues for anxiety, anxiety reduction techniques, how to ID triggers for depression and anxiety- decrease reactivity/eliminate, lifestyle changes to reduce depression and anxiety, communication skills, explore cognitive beliefs and help client to develop healthy cognitive patterns and beliefs.    Objective #B  Patient will identify three distraction and diversion activities and use those activities to decrease level of anxiety  .    Status: Continued - Date(s):  9/12/23     Intervention(s)  Therapist will teach emotional regulation skills. distress tolerance skills, interpersonal effectiveness skills, emotion regluation skills, mindfulness skills, radical acceptance. Therapist will teach client how to ID body cues for anxiety, anxiety reduction techniques, how to ID triggers for depression and anxiety- decrease reactivity/eliminate, lifestyle changes to reduce depression and anxiety, communication skills, explore cognitive beliefs and help client to develop healthy cognitive patterns and  beliefs.      Patient has reviewed and agreed to the above plan.      Ricarda Bhatia, [facility]

## 2023-11-27 ENCOUNTER — VIRTUAL VISIT (OUTPATIENT)
Dept: PSYCHOLOGY | Facility: CLINIC | Age: 48
End: 2023-11-27
Payer: COMMERCIAL

## 2023-11-27 DIAGNOSIS — F33.1 MODERATE EPISODE OF RECURRENT MAJOR DEPRESSIVE DISORDER (H): Primary | ICD-10-CM

## 2023-11-27 DIAGNOSIS — F41.1 GENERALIZED ANXIETY DISORDER: ICD-10-CM

## 2023-11-27 PROCEDURE — 90834 PSYTX W PT 45 MINUTES: CPT | Mod: VID | Performed by: COUNSELOR

## 2023-11-27 NOTE — PROGRESS NOTES
Answers submitted by the patient for this visit:  Patient Health Questionnaire (Submitted on 11/26/2023)  If you checked off any problems, how difficult have these problems made it for you to do your work, take care of things at home, or get along with other people?: Extremely difficult  PHQ9 TOTAL SCORE: 20          [facility]                                     Progress Note    Patient Name: Radha Beckwith  Date: 11/27/23         Service Type: Individual      Session Start Time: 3:35pm Session End Time: 4:20pm     Session Length: 45    Session #: 16    Attendees: Client    Service Modality:  Video Visit:      Provider verified identity through the following two step process.  Patient provided:  Patient is known previously to provider    Telemedicine Visit: The patient's condition can be safely assessed and treated via synchronous audio and visual telemedicine encounter.      Reason for Telemedicine Visit: Patient convenience (e.g. access to timely appointments / distance to available provider)    Originating Site (Patient Location): Patient's home    Distant Site (Provider Location): Provider Remote Setting- Home Office    Consent:  The patient/guardian has verbally consented to: the potential risks and benefits of telemedicine (video visit) versus in person care; bill my insurance or make self-payment for services provided; and responsibility for payment of non-covered services.     Patient would like the video invitation sent by:  My Chart    Mode of Communication:  Video Conference via [employer]well    Distant Location (Provider):  On-site    As the provider I attest to compliance with applicable laws and regulations related to telemedicine.    DATA  Interactive Complexity: No  Crisis: No        Progress Since Last Session (Related to Symptoms / Goals / Homework):   Symptoms: No change .    Homework: Completed in session      Episode of Care Goals: Minimal progress - ACTION (Actively working  towards change); Intervened by reinforcing change plan / affirming steps taken     Current / Ongoing Stressors and Concerns:   The client stated she had a good Thanksgiving.   Her boyfriend pulled a muscle recently.   Went to the dentist on Friday and gets to go back on Monday to get the rest of the fitting.   Only got a few hours of sleep.        Treatment Objective(s) Addressed in This Session:   Increase interest, engagement, and pleasure in doing things  Feel less tired and more energy during the day        Intervention:   Checked in today mostly with the client. Talked about their holiday and how she's getting along with her boyfriend lately. She continues to want to work on their relationship. Talked about her routine and anything that might be getting in the way of her getting enough sleep.     Assessments completed prior to visit:  The following assessments were completed by patient for this visit:  PHQ9:       9/19/2023     9:15 AM 9/25/2023    10:18 AM 10/24/2023    11:19 AM 10/30/2023    12:22 PM 11/13/2023     2:27 PM 11/19/2023     9:30 AM 11/26/2023    10:37 PM   PHQ-9 SCORE   PHQ-9 Total Score MyChart 17 (Moderately severe depression) 18 (Moderately severe depression) 15 (Moderately severe depression) 14 (Moderate depression) 24 (Severe depression) 20 (Severe depression) 20 (Severe depression)   PHQ-9 Total Score 17 18 15 14 24 20 20     GAD7:       6/12/2023    10:19 AM 8/1/2023    12:15 PM 9/4/2023    10:01 PM 9/19/2023     9:19 AM 10/4/2023    12:32 PM 10/30/2023    12:23 PM 11/19/2023     9:32 AM   BO-7 SCORE   Total Score 19 (severe anxiety) 19 (severe anxiety) 19 (severe anxiety) 18 (severe anxiety) 19 (severe anxiety) 18 (severe anxiety) 16 (severe anxiety)   Total Score 19    19 19 19 18    18 19 18    18 16     PROMIS 10-Global Health (all questions and answers displayed):       2/7/2023    12:57 PM 4/17/2023     2:12 PM 6/25/2023     2:45 PM 8/10/2023    12:50 PM 11/10/2023     9:20 PM  11/19/2023     9:34 AM 11/26/2023    10:43 PM   PROMIS 10   In general, would you say your health is:  Poor Poor Fair Fair Poor Fair   In general, would you say your quality of life is:  Poor Fair Poor Fair Poor Poor   In general, how would you rate your physical health?  Poor Poor Poor Fair Poor Poor   In general, how would you rate your mental health, including your mood and your ability to think?  Fair Fair Poor Fair Poor Poor   In general, how would you rate your satisfaction with your social activities and relationships?  Fair Poor Fair Fair Poor Poor   In general, please rate how well you carry out your usual social activities and roles  Fair Poor Poor Fair Poor Fair   To what extent are you able to carry out your everyday physical activities such as walking, climbing stairs, carrying groceries, or moving a chair?  A little Moderately A little Moderately Mostly A little   In the past 7 days, how often have you been bothered by emotional problems such as feeling anxious, depressed, or irritable?  Always Always Always Often Always Always   In the past 7 days, how would you rate your fatigue on average?  Moderate Severe Severe Severe Severe Severe   In the past 7 days, how would you rate your pain on average, where 0 means no pain, and 10 means worst imaginable pain?  8 7 7 7 7 7   In general, would you say your health is:  1    1 1 2 2 1 2   In general, would you say your quality of life is:  1    1 2 1 2 1 1   In general, how would you rate your physical health?  1    1 1 1 2 1 1   In general, how would you rate your mental health, including your mood and your ability to think?  2    2 2 1 2 1 1   In general, how would you rate your satisfaction with your social activities and relationships?  2    2 1 2 2 1 1   In general, please rate how well you carry out your usual social activities and roles. (This includes activities at home, at work and in your community, and responsibilities as a parent, child, spouse,  employee, friend, etc.)  2    2 1 1 2 1 2   To what extent are you able to carry out your everyday physical activities such as walking, climbing stairs, carrying groceries, or moving a chair?  2    2 3 2 3 4 2   In the past 7 days, how often have you been bothered by emotional problems such as feeling anxious, depressed, or irritable?  5    5 5 5 4 5 5   In the past 7 days, how would you rate your fatigue on average?  3    3 4 4 4 4 4   In the past 7 days, how would you rate your pain on average, where 0 means no pain, and 10 means worst imaginable pain?  8    8 7 7 7 7 7   Global Mental Health Score  6    6 6 5 8 4 4   Global Physical Health Score  8    8 8 7 9 9 7   PROMIS TOTAL - SUBSCORES  14    14 14 12 17 13 11       Information is confidential and restricted. Go to Review Flowsheets to unlock data.    Multiple values from one day are sorted in reverse-chronological order     [place] Suicide Severity Rating Scale (Lifetime/Recent)      10/4/2022    11:00 AM   [place] Suicide Severity Rating (Lifetime/Recent)   Q1 Wished to be Dead (Past Month) no   Q2 Suicidal Thoughts (Past Month) no   Q3 Suicidal Thought Method no   Q4 Suicidal Intent without Specific Plan no   Q5 Suicide Intent with Specific Plan yes   Q6 Suicide Behavior (Lifetime) yes   Within the Past 3 Months? no   Level of Risk per Screen high risk         ASSESSMENT: Current Emotional / Mental Status (status of significant symptoms):   Risk status (Self / Other harm or suicidal ideation)   Patient denies current fears or concerns for personal safety.   Patient denies current or recent suicidal ideation or behaviors.   Patient denies current or recent homicidal ideation or behaviors.   Patient denies current or recent self injurious behavior or ideation.   Patient denies other safety concerns.   Patient reports there has been no change in risk factors since their last session.     Patient reports there has been no change in protective factors since  their last session.     Recommended that patient call 911 or go to the local ED should there be a change in any of these risk factors.     Appearance:   Appropriate    Eye Contact:   Good    Psychomotor Behavior: Normal    Attitude:   Cooperative    Orientation:   All   Speech    Rate / Production: Normal/ Responsive Normal     Volume:  Normal    Mood:    Normal   Affect:    Appropriate    Thought Content:  Clear    Thought Form:  Coherent  Logical    Insight:    Good      Medication Review:   No changes to current psychiatric medication(s)     Medication Compliance:   Yes     Changes in Health Issues:   None reported     Chemical Use Review:   Substance Use: Chemical use reviewed, no active concerns identified      Tobacco Use: No change in amount of tobacco use since last session.  Patient declined discussion at this time    Diagnosis:  1. Moderate episode of recurrent major depressive disorder (H)    2. Generalized anxiety disorder          Collateral Reports Completed:   Not Applicable    PLAN: (Patient Tasks / Therapist Tasks / Other)  Continue to work on stress reduction skills and communicating with her boyfriend.         Ricarda Bhatia, [facility]                                                         ______________________________________________________________________    Individual Treatment Plan    Patient's Name: Radha Beckwith  YOB: 1975    Date of Creation: 6/28/23  Date Treatment Plan Last Reviewed/Revised: 9/12/23    DSM5 Diagnoses: 296.32 (F33.1) Major Depressive Disorder, Recurrent Episode, Moderate _  Psychosocial / Contextual Factors: living with boyfriend, memory issues  PROMIS (reviewed every 90 days):     Referral / Collaboration:  Referral to another professional/service is not indicated at this time..    Anticipated number of session for this episode of care: 9-12 sessions  Anticipation frequency of session:  as needed  Anticipated Duration of each session: 38-52  minutes  Treatment plan will be reviewed in 90 days or when goals have been changed.       MeasurableTreatment Goal(s) related to diagnosis / functional impairment(s)  Goal 1: Patient will increase communication skills with family members.    Objective #A (Patient Action)    Patient will learn & utilize at least 2 assertive communication skills weekly.  Status: Continued - Date(s):9/12/23     Intervention(s)  Therapist will teach emotional regulation skills. distress tolerance skills, interpersonal effectiveness skills, emotion regluation skills, mindfulness skills, radical acceptance. Therapist will teach client how to ID body cues for anxiety, anxiety reduction techniques, how to ID triggers for depression and anxiety- decrease reactivity/eliminate, lifestyle changes to reduce depression and anxiety, communication skills, explore cognitive beliefs and help client to develop healthy cognitive patterns and beliefs.    Objective #B  Patient will use thought-stopping strategy daily to reduce intrusive thoughts.  Status: Continued - Date(s):9/12/23     Intervention(s)  Therapist will teach emotional regulation skills. distress tolerance skills, interpersonal effectiveness skills, emotion regluation skills, mindfulness skills, radical acceptance. Therapist will teach client how to ID body cues for anxiety, anxiety reduction techniques, how to ID triggers for depression and anxiety- decrease reactivity/eliminate, lifestyle changes to reduce depression and anxiety, communication skills, explore cognitive beliefs and help client to develop healthy cognitive patterns and beliefs.      Goal 2: Patient will decrease depression symptoms.      Objective #A (Patient Action)    Status: Continued - Date(s):9/12/23     Patient will Increase interest, engagement, and pleasure in doing things  Decrease frequency and intensity of feeling down, depressed, hopeless  Improve quantity and quality of night time sleep / decrease daytime  naps  Feel less tired and more energy during the day   Improve diet, appetite, mindful eating, and / or meal planning  Identify negative self-talk and behaviors: challenge core beliefs, myths, and actions  Improve concentration, focus, and mindfulness in daily activities   Feel less fidgety, restless or slow in daily activities / interpersonal interactions.    Intervention(s)  Therapist will teach emotional regulation skills. distress tolerance skills, interpersonal effectiveness skills, emotion regluation skills, mindfulness skills, radical acceptance. Therapist will teach client how to ID body cues for anxiety, anxiety reduction techniques, how to ID triggers for depression and anxiety- decrease reactivity/eliminate, lifestyle changes to reduce depression and anxiety, communication skills, explore cognitive beliefs and help client to develop healthy cognitive patterns and beliefs.    Objective #B  Patient will identify three distraction and diversion activities and use those activities to decrease level of anxiety  .    Status: Continued - Date(s):  9/12/23     Intervention(s)  Therapist will teach emotional regulation skills. distress tolerance skills, interpersonal effectiveness skills, emotion regluation skills, mindfulness skills, radical acceptance. Therapist will teach client how to ID body cues for anxiety, anxiety reduction techniques, how to ID triggers for depression and anxiety- decrease reactivity/eliminate, lifestyle changes to reduce depression and anxiety, communication skills, explore cognitive beliefs and help client to develop healthy cognitive patterns and beliefs.      Patient has reviewed and agreed to the above plan.      Ricarda Bhatia [facility]

## 2023-12-05 DIAGNOSIS — M54.2 CHRONIC NECK PAIN: ICD-10-CM

## 2023-12-05 DIAGNOSIS — M79.18 MYOFASCIAL PAIN: ICD-10-CM

## 2023-12-05 DIAGNOSIS — G89.29 CHRONIC NECK PAIN: ICD-10-CM

## 2023-12-05 DIAGNOSIS — F51.04 PSYCHOPHYSIOLOGICAL INSOMNIA: ICD-10-CM

## 2023-12-05 DIAGNOSIS — M54.42 CHRONIC BILATERAL LOW BACK PAIN WITH BILATERAL SCIATICA: ICD-10-CM

## 2023-12-05 DIAGNOSIS — G89.29 CHRONIC BILATERAL LOW BACK PAIN WITH BILATERAL SCIATICA: ICD-10-CM

## 2023-12-05 DIAGNOSIS — G89.29 CHRONIC BILATERAL THORACIC BACK PAIN: ICD-10-CM

## 2023-12-05 DIAGNOSIS — M54.41 CHRONIC BILATERAL LOW BACK PAIN WITH BILATERAL SCIATICA: ICD-10-CM

## 2023-12-05 DIAGNOSIS — M54.10 RADICULAR PAIN OF UPPER EXTREMITY: ICD-10-CM

## 2023-12-05 DIAGNOSIS — M54.6 CHRONIC BILATERAL THORACIC BACK PAIN: ICD-10-CM

## 2023-12-05 NOTE — PROGRESS NOTES
"    SUBJECTIVE:  HPI:  Radha Beckwith  Is a 48 year old female who presents today for new patient evaluation of low back pain upon referral from Dr. Azam Arteaga, was suspicious of Pelvic Joint Dysfunction, and whose 9/28/2023 office note records:  \"47 year old female with neck and back pain, cervical and lumbar spondylosis.  6 months of daily throbbing neck pain radiating to the bilateral shoulders.  Also feels some burning and numbness in her hands.  UE EMG was normal.  Also with throbbing low back pain radiating to the left>right lateral thighs.  Worse with standing and activity.  Past bilateral SI joint injections without improvement.  MR Cervical, personally reviewed, with mild spondylotic change, moderate right C4-5 foraminal stenosis.  MR Lumbar, personally reviewed, with mild degenerative change, mild left L5-S1 foraminal stenosis.\"  Neurologic exam was nonfocal and he ordered a cervical JASE    KINDRA 10/20/2023.  Bilateral L5-S1 TFESI 7/1/2022    History today:    Linette says that the epidural was nontherapeutic.  She does get some discomfort in bilateral lateral thighs but mainly the pain is in the posterior pelvic area.  Per pain diagram shows exes of the way up her spine as well as in the bilateral upper quadrants and in the lumbar muscles.  She describes \"electric shock\" sensations that radiate out a few inches on either side of her entire spine.  She has had pain for about 3 years without any inciting event      SYMPTOMS WORSENED WITH bending over USP, loading and unloading of discharge    SYMPTOMS IMPROVED WITH heating pad, TENS unit    Pain score and diag feeding/water chickens, laundry, vacuumingram reviewed.  See questionnaire.      ROS: .  Otherwise negative for bowel/bladder dysfunction, balance changes, headache, leg pain/numbness/weakness, fevers, chills, night sweats, unexplained weight loss;  otherwise unremarkable.   See the patient's intake questionnaire from today for " details.    Treatment to Date: As above.  Tylenol, gabapentin, baclofen, cyclobenzaprine.  Chiropractic.  No PT for the low back.    MEDICATIONS:  Reviewed.    ALLERGIES:  Reviewed.     Past medical and surgical history:   Pertinent for anxiety, depression, SVT, opioid dependence in remission, smoker, GERD, RLS.  Status post hysterectomy without BSO for DUB.    SOCIAL HX: Unemployed previously customer service.  She is single and has one 26-year-old son.  Sports hobbies and activities: Nothing      OBJECTIVE:    IMAGING: Images and report reviewed.    MRI OF THE LUMBAR SPINE WITHOUT CONTRAST 9/26/2023      COMPARISON: Lumbar spine MRI 3/8/2022.    L3-L4: Loss of disc height, disc desiccation and mild circumferential  disc bulging. No herniation. Moderate facet arthropathy bilaterally.  No spinal canal stenosis. No foraminal stenosis on either side.     L4-L5: Loss of disc height, disc desiccation and circumferential disc  bulging with a superimposed left lateral disc herniation (protrusion).  Mild facet arthropathy bilaterally. No spinal canal stenosis. Mild  left foraminal narrowing. No right foraminal stenosis.     L5-S1: Loss of disc height, disc desiccation and circumferential disc  bulging with a superimposed left paracentral disc herniation  (extrusion). Mild facet arthropathy bilaterally. No spinal canal  stenosis. Mild to moderate left foraminal narrowing. No right  foraminal stenosis.                                                                      IMPRESSION:  1. Degenerative change of the lumbar spine as detailed above without  appreciable change from the comparison study.  2. Left lateral disc herniations at L4-L5 and L5-S1 again noted.  3. Mild to moderate left foraminal narrowing at L5-S1.  4. No other significant spinal canal or neural foraminal narrowing of  the lumbar spine.    MRI OF THE THORACIC SPINE WITHOUT CONTRAST  9/26/2023                                                                     IMPRESSION:  1. Modic 2 degenerative marrow signal change in the opposing endplates  at T9-T10.  2. Otherwise, normal thoracic spine MRI.    MRI OF THE CERVICAL SPINE WITHOUT CONTRAST 9/26/2023                                                                  IMPRESSION:  1. Diffuse degenerative change of the cervical spine as detailed  above.  2. No spinal canal stenosis of the cervical spine.  3. Moderate neural foraminal stenosis on the right at C4-C5. No other  high-grade neural foraminal narrowing of the cervical spine.         EXAMINATION:    --CONSTITUTIONAL:   No acute distress.  The patient is well nourished and well groomed.  She transitions without pushoff.  She moves hesitantly and slowly about the room.  BMI is elevated.  --SKIN:  Skin over the face, bilateral lower extremities, and posterior torso is clean, dry, intact without rashes.    --GAIT:  is non-antalgic. Flat foot, heel and toe walking:  normal   .  Squat and rise   normal    but only senior care.  --STANDING EXAMINATION:    Symmetry of spine/pelvis   unremarkable   .      Range of motion forward flexion hands to mid shins and painful.  Extension 50% reduced and more painful..   Standing flexion positive right.    Mateusz's sign    positive.     Stork test   negative    .   --NEUROLOGICAL:     ROMBERG, TANDEM WALK, PRONATOR DRIFT:   Normal.   .  SENSATION to light touch is intact in bilateral thighs, lower legs and feet.   REFLEXES:  patellar 2+, and achilles 2+.  Babinski is negative. No clonus.  MANUAL MOTOR TESTING:  L1- S1 Myotomes, Femoral, Obturator, Peroneal and Tibial nerves 5/5   DURAL STRETCH TESTS:  SLR negative.    --PELVIC/HIP JOINTS:                Long Sitting    right leg 1-3 cm long.    Hip scour   negative   .    Hip Impingement   negative   .   KATHY   negative    .     Piriformis   negative   .   Spring testing tender right greater than left SI joint greater than L5.      PELVIC ALIGNMENT right posterior innominate rotation.   Right posterior sacral torsion.   --LUMBAR/GLUTEAL MUSCLES: Lumbar nontender.  Right greater than left gluteal pain.    --ABDOMINAL:  Non-distended.  Palpation the periumbilical and right lower quadrant and right upper quadrant reproduces right flank and back pain.  No mass.  No organomegaly.  No peritoneal signs.  --VASCULAR: Femoral pulses 2+. Lower extremity capillary refill, temperature and color normal.       Procedure note-OMT:  Manual medicine restore normal pelvic alignment.  She still had tenderness but less so over the right SI joint.  Sacrum corrected on its own but she had decreased compliance of the left SI joint.  Forward flexion was much less painful and she was able to bring.  Fingers to her ankles.  Extension was still painful and limited.  Standing flexion test became negative.  Long sitting test negative with equal leg lengths      ASSESSMENT: Radha Beckwith is a 48 year old female who presents  today for new patient evaluation of:    Chronic low back pain  Pelvic Joint Dysfunction manifesting as a right posterior innominate rotation-right posterior sacral torsion.  Encouraging response to OMT  Some right-sided back pain associated with abdominal palpation  Bilateral L5-S1 TFESI-nontherapeutic      PLAN:  See family doctor to workup abdominal discomfort with a thorough abdominal and pelvic workup-warned.  Pelvic Joint Dysfunction self-correction each morning, start pelvic stabilization PT, and recheck in 4 months.  She is significantly deconditioned and she will have to work hard to regain a lot of loss muscle strength.    Advised patient to call or return early if symptoms worsen, or having problems controlling bladder and bowel function or worsening leg weakness.     Please note: Voice recognition software was used in this dictation.  It may therefore contain typographical errors.    Guille Rosario MD

## 2023-12-06 RX ORDER — GABAPENTIN 300 MG/1
600 CAPSULE ORAL 3 TIMES DAILY
Qty: 180 CAPSULE | Refills: 1 | Status: SHIPPED | OUTPATIENT
Start: 2023-12-06 | End: 2024-03-15

## 2023-12-06 RX ORDER — BACLOFEN 20 MG/1
20 TABLET ORAL 2 TIMES DAILY
Qty: 60 TABLET | Refills: 1 | Status: SHIPPED | OUTPATIENT
Start: 2023-12-06 | End: 2024-02-08

## 2023-12-06 RX ORDER — ZOLPIDEM TARTRATE 5 MG/1
TABLET ORAL
Qty: 30 TABLET | Refills: 0 | Status: SHIPPED | OUTPATIENT
Start: 2023-12-08 | End: 2024-01-08

## 2023-12-06 NOTE — TELEPHONE ENCOUNTER
Received request from patient requesting refill(s) for baclofen (LIORESAL) 20 MG tablet   And  gabapentin (NEURONTIN) 300 MG capsule     Last refilled on 09/04/23-Baclofen  11/08/23-Gabapentin      Pt last seen on 10/04/23  Next appt scheduled for 01/03/24    Will facilitate refill.

## 2023-12-06 NOTE — TELEPHONE ENCOUNTER
Chart reviewed - Request appears appropriate. Refilled for 30 day supply.     Jenny Landrum DNP, APRN, AGNP-C  [facility]

## 2023-12-06 NOTE — TELEPHONE ENCOUNTER
[facility] Call Center    Phone Message    May a detailed message be left on voicemail: yes     Reason for Call: Other: Patient calling back regarding her scripts.  She is out and needs them filled today.  They are Gabapentin and  Baclofen.  Please see previous refill request from the patient.      Action Taken: Message routed to:  Other: [facility]    Travel Screening: Not Applicable

## 2023-12-07 ENCOUNTER — VIRTUAL VISIT (OUTPATIENT)
Dept: PSYCHOLOGY | Facility: CLINIC | Age: 48
End: 2023-12-07
Payer: COMMERCIAL

## 2023-12-07 DIAGNOSIS — F33.1 MODERATE EPISODE OF RECURRENT MAJOR DEPRESSIVE DISORDER (H): Primary | ICD-10-CM

## 2023-12-07 DIAGNOSIS — F41.1 GENERALIZED ANXIETY DISORDER: ICD-10-CM

## 2023-12-07 PROCEDURE — 90837 PSYTX W PT 60 MINUTES: CPT | Mod: VID | Performed by: COUNSELOR

## 2023-12-07 NOTE — PROGRESS NOTES
Answers submitted by the patient for this visit:  Patient Health Questionnaire (Submitted on 11/26/2023)  If you checked off any problems, how difficult have these problems made it for you to do your work, take care of things at home, or get along with other people?: Extremely difficult  PHQ9 TOTAL SCORE: 20          [facility]                                     Progress Note    Patient Name: Radha Beckwith  Date: 12/7/23         Service Type: Individual      Session Start Time: 3:00pm Session End Time: 4:00pm     Session Length: 60    Session #: 17    Attendees: Client    Service Modality:  Video Visit:      Provider verified identity through the following two step process.  Patient provided:  Patient is known previously to provider    Telemedicine Visit: The patient's condition can be safely assessed and treated via synchronous audio and visual telemedicine encounter.      Reason for Telemedicine Visit: Patient convenience (e.g. access to timely appointments / distance to available provider)    Originating Site (Patient Location): Patient's home    Distant Site (Provider Location): Provider Remote Setting- Home Office    Consent:  The patient/guardian has verbally consented to: the potential risks and benefits of telemedicine (video visit) versus in person care; bill my insurance or make self-payment for services provided; and responsibility for payment of non-covered services.     Patient would like the video invitation sent by:  My Chart    Mode of Communication:  Video Conference via [employer]well    Distant Location (Provider):  On-site    As the provider I attest to compliance with applicable laws and regulations related to telemedicine.    DATA  Interactive Complexity: No  Crisis: No        Progress Since Last Session (Related to Symptoms / Goals / Homework):   Symptoms: No change .    Homework: Completed in session      Episode of Care Goals: Minimal progress - ACTION (Actively working  towards change); Intervened by reinforcing change plan / affirming steps taken     Current / Ongoing Stressors and Concerns:   The client stated she is doing okay.   Her boyfriend is still hurt and hasn't seen anyone for it.   Her son is coming home on the Dec. 16th.   Has a physical coming up with her doctor. Wants to do a blood panel, hasn't had one done in a long time.   Apologized to her son this last week for not standing up for him more in front of her mom. She stated the conversation went really well.        Treatment Objective(s) Addressed in This Session:   Increase interest, engagement, and pleasure in doing things  Feel less tired and more energy during the day        Intervention:   Talked about her upcoming physical and what she plans to address with her provider. Discussed various labwork she would like to get done and suggested some tests she can request that might be relating to various symptoms. Processed the conversation she had with her son and celebrated with her about it.     Assessments completed prior to visit:  The following assessments were completed by patient for this visit:  PHQ9:       9/19/2023     9:15 AM 9/25/2023    10:18 AM 10/24/2023    11:19 AM 10/30/2023    12:22 PM 11/13/2023     2:27 PM 11/19/2023     9:30 AM 11/26/2023    10:37 PM   PHQ-9 SCORE   PHQ-9 Total Score MyChart 17 (Moderately severe depression) 18 (Moderately severe depression) 15 (Moderately severe depression) 14 (Moderate depression) 24 (Severe depression) 20 (Severe depression) 20 (Severe depression)   PHQ-9 Total Score 17 18 15 14 24 20 20     GAD7:       6/12/2023    10:19 AM 8/1/2023    12:15 PM 9/4/2023    10:01 PM 9/19/2023     9:19 AM 10/4/2023    12:32 PM 10/30/2023    12:23 PM 11/19/2023     9:32 AM   BO-7 SCORE   Total Score 19 (severe anxiety) 19 (severe anxiety) 19 (severe anxiety) 18 (severe anxiety) 19 (severe anxiety) 18 (severe anxiety) 16 (severe anxiety)   Total Score 19    19 19 19 18    18 19  18    18 16     PROMIS 10-Global Health (all questions and answers displayed):       2/7/2023    12:57 PM 4/17/2023     2:12 PM 6/25/2023     2:45 PM 8/10/2023    12:50 PM 11/10/2023     9:20 PM 11/19/2023     9:34 AM 11/26/2023    10:43 PM   PROMIS 10   In general, would you say your health is:  Poor Poor Fair Fair Poor Fair   In general, would you say your quality of life is:  Poor Fair Poor Fair Poor Poor   In general, how would you rate your physical health?  Poor Poor Poor Fair Poor Poor   In general, how would you rate your mental health, including your mood and your ability to think?  Fair Fair Poor Fair Poor Poor   In general, how would you rate your satisfaction with your social activities and relationships?  Fair Poor Fair Fair Poor Poor   In general, please rate how well you carry out your usual social activities and roles  Fair Poor Poor Fair Poor Fair   To what extent are you able to carry out your everyday physical activities such as walking, climbing stairs, carrying groceries, or moving a chair?  A little Moderately A little Moderately Mostly A little   In the past 7 days, how often have you been bothered by emotional problems such as feeling anxious, depressed, or irritable?  Always Always Always Often Always Always   In the past 7 days, how would you rate your fatigue on average?  Moderate Severe Severe Severe Severe Severe   In the past 7 days, how would you rate your pain on average, where 0 means no pain, and 10 means worst imaginable pain?  8 7 7 7 7 7   In general, would you say your health is:  1    1 1 2 2 1 2   In general, would you say your quality of life is:  1    1 2 1 2 1 1   In general, how would you rate your physical health?  1    1 1 1 2 1 1   In general, how would you rate your mental health, including your mood and your ability to think?  2    2 2 1 2 1 1   In general, how would you rate your satisfaction with your social activities and relationships?  2    2 1 2 2 1 1   In  general, please rate how well you carry out your usual social activities and roles. (This includes activities at home, at work and in your community, and responsibilities as a parent, child, spouse, employee, friend, etc.)  2    2 1 1 2 1 2   To what extent are you able to carry out your everyday physical activities such as walking, climbing stairs, carrying groceries, or moving a chair?  2    2 3 2 3 4 2   In the past 7 days, how often have you been bothered by emotional problems such as feeling anxious, depressed, or irritable?  5    5 5 5 4 5 5   In the past 7 days, how would you rate your fatigue on average?  3    3 4 4 4 4 4   In the past 7 days, how would you rate your pain on average, where 0 means no pain, and 10 means worst imaginable pain?  8    8 7 7 7 7 7   Global Mental Health Score  6    6 6 5 8 4 4   Global Physical Health Score  8    8 8 7 9 9 7   PROMIS TOTAL - SUBSCORES  14    14 14 12 17 13 11       Information is confidential and restricted. Go to Review Flowsheets to unlock data.    Multiple values from one day are sorted in reverse-chronological order     [place] Suicide Severity Rating Scale (Lifetime/Recent)      10/4/2022    11:00 AM   [place] Suicide Severity Rating (Lifetime/Recent)   Q1 Wished to be Dead (Past Month) no   Q2 Suicidal Thoughts (Past Month) no   Q3 Suicidal Thought Method no   Q4 Suicidal Intent without Specific Plan no   Q5 Suicide Intent with Specific Plan yes   Q6 Suicide Behavior (Lifetime) yes   Within the Past 3 Months? no   Level of Risk per Screen high risk         ASSESSMENT: Current Emotional / Mental Status (status of significant symptoms):   Risk status (Self / Other harm or suicidal ideation)   Patient denies current fears or concerns for personal safety.   Patient denies current or recent suicidal ideation or behaviors.   Patient denies current or recent homicidal ideation or behaviors.   Patient denies current or recent self injurious behavior or  ideation.   Patient denies other safety concerns.   Patient reports there has been no change in risk factors since their last session.     Patient reports there has been no change in protective factors since their last session.     Recommended that patient call 911 or go to the local ED should there be a change in any of these risk factors.     Appearance:   Appropriate    Eye Contact:   Good    Psychomotor Behavior: Normal    Attitude:   Cooperative    Orientation:   All   Speech    Rate / Production: Normal/ Responsive Normal     Volume:  Normal    Mood:    Normal   Affect:    Appropriate    Thought Content:  Clear    Thought Form:  Coherent  Logical    Insight:    Good      Medication Review:   No changes to current psychiatric medication(s)     Medication Compliance:   Yes     Changes in Health Issues:   None reported     Chemical Use Review:   Substance Use: Chemical use reviewed, no active concerns identified      Tobacco Use: No change in amount of tobacco use since last session.  Patient declined discussion at this time    Diagnosis:  1. Moderate episode of recurrent major depressive disorder (H)    2. Generalized anxiety disorder          Collateral Reports Completed:   Not Applicable    PLAN: (Patient Tasks / Therapist Tasks / Other)  Continue to work on stress reduction skills and communicating with her boyfriend.         Ricarda Bhatia, [facility]                                                         ______________________________________________________________________    Individual Treatment Plan    Patient's Name: Radha Beckwith  YOB: 1975    Date of Creation: 6/28/23  Date Treatment Plan Last Reviewed/Revised: 12/7/23    DSM5 Diagnoses: 296.32 (F33.1) Major Depressive Disorder, Recurrent Episode, Moderate _  Psychosocial / Contextual Factors: living with boyfriend, memory issues  PROMIS (reviewed every 90 days):     Referral / Collaboration:  Referral to another professional/service  is not indicated at this time..    Anticipated number of session for this episode of care: 9-12 sessions  Anticipation frequency of session:  as needed  Anticipated Duration of each session: 38-52 minutes  Treatment plan will be reviewed in 90 days or when goals have been changed.       MeasurableTreatment Goal(s) related to diagnosis / functional impairment(s)  Goal 1: Patient will increase communication skills with family members.    Objective #A (Patient Action)    Patient will learn & utilize at least 2 assertive communication skills weekly.  Status: Continued - Date(s): 12/7/23     Intervention(s)  Therapist will teach emotional regulation skills. distress tolerance skills, interpersonal effectiveness skills, emotion regluation skills, mindfulness skills, radical acceptance. Therapist will teach client how to ID body cues for anxiety, anxiety reduction techniques, how to ID triggers for depression and anxiety- decrease reactivity/eliminate, lifestyle changes to reduce depression and anxiety, communication skills, explore cognitive beliefs and help client to develop healthy cognitive patterns and beliefs.    Objective #B  Patient will use thought-stopping strategy daily to reduce intrusive thoughts.  Status: Continued - Date(s): 12/7/23    Intervention(s)  Therapist will teach emotional regulation skills. distress tolerance skills, interpersonal effectiveness skills, emotion regluation skills, mindfulness skills, radical acceptance. Therapist will teach client how to ID body cues for anxiety, anxiety reduction techniques, how to ID triggers for depression and anxiety- decrease reactivity/eliminate, lifestyle changes to reduce depression and anxiety, communication skills, explore cognitive beliefs and help client to develop healthy cognitive patterns and beliefs.      Goal 2: Patient will decrease depression symptoms.      Objective #A (Patient Action)    Status: Continued - Date(s): 12/7/23    Patient will  Increase interest, engagement, and pleasure in doing things  Decrease frequency and intensity of feeling down, depressed, hopeless  Improve quantity and quality of night time sleep / decrease daytime naps  Feel less tired and more energy during the day   Improve diet, appetite, mindful eating, and / or meal planning  Identify negative self-talk and behaviors: challenge core beliefs, myths, and actions  Improve concentration, focus, and mindfulness in daily activities   Feel less fidgety, restless or slow in daily activities / interpersonal interactions.    Intervention(s)  Therapist will teach emotional regulation skills. distress tolerance skills, interpersonal effectiveness skills, emotion regluation skills, mindfulness skills, radical acceptance. Therapist will teach client how to ID body cues for anxiety, anxiety reduction techniques, how to ID triggers for depression and anxiety- decrease reactivity/eliminate, lifestyle changes to reduce depression and anxiety, communication skills, explore cognitive beliefs and help client to develop healthy cognitive patterns and beliefs.    Objective #B  Patient will identify three distraction and diversion activities and use those activities to decrease level of anxiety  .    Status: Continued - Date(s):  12/7/23    Intervention(s)  Therapist will teach emotional regulation skills. distress tolerance skills, interpersonal effectiveness skills, emotion regluation skills, mindfulness skills, radical acceptance. Therapist will teach client how to ID body cues for anxiety, anxiety reduction techniques, how to ID triggers for depression and anxiety- decrease reactivity/eliminate, lifestyle changes to reduce depression and anxiety, communication skills, explore cognitive beliefs and help client to develop healthy cognitive patterns and beliefs.      Patient has reviewed and agreed to the above plan.      Ricarda Bhatia [facility]

## 2023-12-10 ASSESSMENT — ANXIETY QUESTIONNAIRES
2. NOT BEING ABLE TO STOP OR CONTROL WORRYING: NEARLY EVERY DAY
5. BEING SO RESTLESS THAT IT IS HARD TO SIT STILL: NEARLY EVERY DAY
GAD7 TOTAL SCORE: 21
IF YOU CHECKED OFF ANY PROBLEMS ON THIS QUESTIONNAIRE, HOW DIFFICULT HAVE THESE PROBLEMS MADE IT FOR YOU TO DO YOUR WORK, TAKE CARE OF THINGS AT HOME, OR GET ALONG WITH OTHER PEOPLE: EXTREMELY DIFFICULT
GAD7 TOTAL SCORE: 21
3. WORRYING TOO MUCH ABOUT DIFFERENT THINGS: NEARLY EVERY DAY
4. TROUBLE RELAXING: NEARLY EVERY DAY
7. FEELING AFRAID AS IF SOMETHING AWFUL MIGHT HAPPEN: NEARLY EVERY DAY
1. FEELING NERVOUS, ANXIOUS, OR ON EDGE: NEARLY EVERY DAY
6. BECOMING EASILY ANNOYED OR IRRITABLE: NEARLY EVERY DAY

## 2023-12-11 ENCOUNTER — VIRTUAL VISIT (OUTPATIENT)
Dept: PSYCHOLOGY | Facility: CLINIC | Age: 48
End: 2023-12-11
Payer: COMMERCIAL

## 2023-12-11 ENCOUNTER — TELEPHONE (OUTPATIENT)
Dept: OTOLARYNGOLOGY | Facility: CLINIC | Age: 48
End: 2023-12-11
Payer: COMMERCIAL

## 2023-12-11 DIAGNOSIS — F33.1 MODERATE EPISODE OF RECURRENT MAJOR DEPRESSIVE DISORDER (H): Primary | ICD-10-CM

## 2023-12-11 DIAGNOSIS — F41.1 GENERALIZED ANXIETY DISORDER: ICD-10-CM

## 2023-12-11 PROCEDURE — 90834 PSYTX W PT 45 MINUTES: CPT | Mod: VID | Performed by: COUNSELOR

## 2023-12-11 ASSESSMENT — ENCOUNTER SYMPTOMS
MYALGIAS: 1
PARESTHESIAS: 1
HEARTBURN: 1
BREAST MASS: 0
HEADACHES: 1
NERVOUS/ANXIOUS: 1

## 2023-12-11 NOTE — PROGRESS NOTES
Answers submitted by the patient for this visit:  BO-7 (Submitted on 12/10/2023)  BO 7 TOTAL SCORE: 21          [facility]                                     Progress Note    Patient Name: Radha Beckwith  Date: 12/11/23         Service Type: Individual      Session Start Time: 2:40pm Session End Time: 3:30pm     Session Length: 50 mins    Session #: 18    Attendees: Client    Service Modality:  Video Visit:      Provider verified identity through the following two step process.  Patient provided:  Patient is known previously to provider    Telemedicine Visit: The patient's condition can be safely assessed and treated via synchronous audio and visual telemedicine encounter.      Reason for Telemedicine Visit: Patient convenience (e.g. access to timely appointments / distance to available provider)    Originating Site (Patient Location): Patient's home    Distant Site (Provider Location): Provider Remote Setting- Home Office    Consent:  The patient/guardian has verbally consented to: the potential risks and benefits of telemedicine (video visit) versus in person care; bill my insurance or make self-payment for services provided; and responsibility for payment of non-covered services.     Patient would like the video invitation sent by:  My Chart    Mode of Communication:  Video Conference via [employer]    Distant Location (Provider):  On-site    As the provider I attest to compliance with applicable laws and regulations related to telemedicine.    DATA  Interactive Complexity: No  Crisis: No        Progress Since Last Session (Related to Symptoms / Goals / Homework):   Symptoms: No change .    Homework: Completed in session      Episode of Care Goals: Minimal progress - ACTION (Actively working towards change); Intervened by reinforcing change plan / affirming steps taken     Current / Ongoing Stressors and Concerns:   The client stated she thoughts today's appointment was at 3:30pm instead of  2:30pm which is why she was late.   She slept all day yesterday and couldn't keep her eyes open. Has been having really bad stomach pains. Wonders if the fatigue has anything to do with what they found with her most recent MRI and sinus issues.   Made an eye appointment because her eyesight is pretty bad.        Treatment Objective(s) Addressed in This Session:   Increase interest, engagement, and pleasure in doing things  Feel less tired and more energy during the day        Intervention:   Discussed the various things she plans on talking with her doctor about. Validated her frustrations about her medications and unanswered questions.      Assessments completed prior to visit:  The following assessments were completed by patient for this visit:  PHQ9:       9/19/2023     9:15 AM 9/25/2023    10:18 AM 10/24/2023    11:19 AM 10/30/2023    12:22 PM 11/13/2023     2:27 PM 11/19/2023     9:30 AM 11/26/2023    10:37 PM   PHQ-9 SCORE   PHQ-9 Total Score MyChart 17 (Moderately severe depression) 18 (Moderately severe depression) 15 (Moderately severe depression) 14 (Moderate depression) 24 (Severe depression) 20 (Severe depression) 20 (Severe depression)   PHQ-9 Total Score 17 18 15 14 24 20 20     GAD7:       8/1/2023    12:15 PM 9/4/2023    10:01 PM 9/19/2023     9:19 AM 10/4/2023    12:32 PM 10/30/2023    12:23 PM 11/19/2023     9:32 AM 12/10/2023     4:50 AM   BO-7 SCORE   Total Score 19 (severe anxiety) 19 (severe anxiety) 18 (severe anxiety) 19 (severe anxiety) 18 (severe anxiety) 16 (severe anxiety) 21 (severe anxiety)   Total Score 19 19 18    18 19 18    18 16 21     PROMIS 10-Global Health (all questions and answers displayed):       2/7/2023    12:57 PM 4/17/2023     2:12 PM 6/25/2023     2:45 PM 8/10/2023    12:50 PM 11/10/2023     9:20 PM 11/19/2023     9:34 AM 11/26/2023    10:43 PM   PROMIS 10   In general, would you say your health is:  Poor Poor Fair Fair Poor Fair   In general, would you say your  quality of life is:  Poor Fair Poor Fair Poor Poor   In general, how would you rate your physical health?  Poor Poor Poor Fair Poor Poor   In general, how would you rate your mental health, including your mood and your ability to think?  Fair Fair Poor Fair Poor Poor   In general, how would you rate your satisfaction with your social activities and relationships?  Fair Poor Fair Fair Poor Poor   In general, please rate how well you carry out your usual social activities and roles  Fair Poor Poor Fair Poor Fair   To what extent are you able to carry out your everyday physical activities such as walking, climbing stairs, carrying groceries, or moving a chair?  A little Moderately A little Moderately Mostly A little   In the past 7 days, how often have you been bothered by emotional problems such as feeling anxious, depressed, or irritable?  Always Always Always Often Always Always   In the past 7 days, how would you rate your fatigue on average?  Moderate Severe Severe Severe Severe Severe   In the past 7 days, how would you rate your pain on average, where 0 means no pain, and 10 means worst imaginable pain?  8 7 7 7 7 7   In general, would you say your health is:  1    1 1 2 2 1 2   In general, would you say your quality of life is:  1    1 2 1 2 1 1   In general, how would you rate your physical health?  1    1 1 1 2 1 1   In general, how would you rate your mental health, including your mood and your ability to think?  2    2 2 1 2 1 1   In general, how would you rate your satisfaction with your social activities and relationships?  2    2 1 2 2 1 1   In general, please rate how well you carry out your usual social activities and roles. (This includes activities at home, at work and in your community, and responsibilities as a parent, child, spouse, employee, friend, etc.)  2    2 1 1 2 1 2   To what extent are you able to carry out your everyday physical activities such as walking, climbing stairs, carrying  groceries, or moving a chair?  2    2 3 2 3 4 2   In the past 7 days, how often have you been bothered by emotional problems such as feeling anxious, depressed, or irritable?  5    5 5 5 4 5 5   In the past 7 days, how would you rate your fatigue on average?  3    3 4 4 4 4 4   In the past 7 days, how would you rate your pain on average, where 0 means no pain, and 10 means worst imaginable pain?  8    8 7 7 7 7 7   Global Mental Health Score  6    6 6 5 8 4 4   Global Physical Health Score  8    8 8 7 9 9 7   PROMIS TOTAL - SUBSCORES  14    14 14 12 17 13 11       Information is confidential and restricted. Go to Review Flowsheets to unlock data.    Multiple values from one day are sorted in reverse-chronological order     [place] Suicide Severity Rating Scale (Lifetime/Recent)      10/4/2022    11:00 AM   [place] Suicide Severity Rating (Lifetime/Recent)   Q1 Wished to be Dead (Past Month) no   Q2 Suicidal Thoughts (Past Month) no   Q3 Suicidal Thought Method no   Q4 Suicidal Intent without Specific Plan no   Q5 Suicide Intent with Specific Plan yes   Q6 Suicide Behavior (Lifetime) yes   Within the Past 3 Months? no   Level of Risk per Screen high risk         ASSESSMENT: Current Emotional / Mental Status (status of significant symptoms):   Risk status (Self / Other harm or suicidal ideation)   Patient denies current fears or concerns for personal safety.   Patient denies current or recent suicidal ideation or behaviors.   Patient denies current or recent homicidal ideation or behaviors.   Patient denies current or recent self injurious behavior or ideation.   Patient denies other safety concerns.   Patient reports there has been no change in risk factors since their last session.     Patient reports there has been no change in protective factors since their last session.     Recommended that patient call 911 or go to the local ED should there be a change in any of these risk  factors.     Appearance:   Appropriate    Eye Contact:   Good    Psychomotor Behavior: Normal    Attitude:   Cooperative    Orientation:   All   Speech    Rate / Production: Normal/ Responsive Normal     Volume:  Normal    Mood:    Normal   Affect:    Appropriate    Thought Content:  Clear    Thought Form:  Coherent  Logical    Insight:    Good      Medication Review:   No changes to current psychiatric medication(s)     Medication Compliance:   Yes     Changes in Health Issues:   None reported     Chemical Use Review:   Substance Use: Chemical use reviewed, no active concerns identified      Tobacco Use: No change in amount of tobacco use since last session.  Patient declined discussion at this time    Diagnosis:  1. Moderate episode of recurrent major depressive disorder (H)    2. Generalized anxiety disorder          Collateral Reports Completed:   Not Applicable    PLAN: (Patient Tasks / Therapist Tasks / Other)  Continue to work on stress reduction skills.         Ricarda Bhatia, [facility]                                                         ______________________________________________________________________    Individual Treatment Plan    Patient's Name: Radha Beckwith  YOB: 1975    Date of Creation: 6/28/23  Date Treatment Plan Last Reviewed/Revised: 12/7/23    DSM5 Diagnoses: 296.32 (F33.1) Major Depressive Disorder, Recurrent Episode, Moderate _  Psychosocial / Contextual Factors: living with boyfriend, memory issues  PROMIS (reviewed every 90 days):     Referral / Collaboration:  Referral to another professional/service is not indicated at this time..    Anticipated number of session for this episode of care: 9-12 sessions  Anticipation frequency of session:  as needed  Anticipated Duration of each session: 38-52 minutes  Treatment plan will be reviewed in 90 days or when goals have been changed.       MeasurableTreatment Goal(s) related to diagnosis / functional impairment(s)  Goal  1: Patient will increase communication skills with family members.    Objective #A (Patient Action)    Patient will learn & utilize at least 2 assertive communication skills weekly.  Status: Continued - Date(s): 12/7/23     Intervention(s)  Therapist will teach emotional regulation skills. distress tolerance skills, interpersonal effectiveness skills, emotion regluation skills, mindfulness skills, radical acceptance. Therapist will teach client how to ID body cues for anxiety, anxiety reduction techniques, how to ID triggers for depression and anxiety- decrease reactivity/eliminate, lifestyle changes to reduce depression and anxiety, communication skills, explore cognitive beliefs and help client to develop healthy cognitive patterns and beliefs.    Objective #B  Patient will use thought-stopping strategy daily to reduce intrusive thoughts.  Status: Continued - Date(s): 12/7/23    Intervention(s)  Therapist will teach emotional regulation skills. distress tolerance skills, interpersonal effectiveness skills, emotion regluation skills, mindfulness skills, radical acceptance. Therapist will teach client how to ID body cues for anxiety, anxiety reduction techniques, how to ID triggers for depression and anxiety- decrease reactivity/eliminate, lifestyle changes to reduce depression and anxiety, communication skills, explore cognitive beliefs and help client to develop healthy cognitive patterns and beliefs.      Goal 2: Patient will decrease depression symptoms.      Objective #A (Patient Action)    Status: Continued - Date(s): 12/7/23    Patient will Increase interest, engagement, and pleasure in doing things  Decrease frequency and intensity of feeling down, depressed, hopeless  Improve quantity and quality of night time sleep / decrease daytime naps  Feel less tired and more energy during the day   Improve diet, appetite, mindful eating, and / or meal planning  Identify negative self-talk and behaviors: challenge  core beliefs, myths, and actions  Improve concentration, focus, and mindfulness in daily activities   Feel less fidgety, restless or slow in daily activities / interpersonal interactions.    Intervention(s)  Therapist will teach emotional regulation skills. distress tolerance skills, interpersonal effectiveness skills, emotion regluation skills, mindfulness skills, radical acceptance. Therapist will teach client how to ID body cues for anxiety, anxiety reduction techniques, how to ID triggers for depression and anxiety- decrease reactivity/eliminate, lifestyle changes to reduce depression and anxiety, communication skills, explore cognitive beliefs and help client to develop healthy cognitive patterns and beliefs.    Objective #B  Patient will identify three distraction and diversion activities and use those activities to decrease level of anxiety  .    Status: Continued - Date(s):  12/7/23    Intervention(s)  Therapist will teach emotional regulation skills. distress tolerance skills, interpersonal effectiveness skills, emotion regluation skills, mindfulness skills, radical acceptance. Therapist will teach client how to ID body cues for anxiety, anxiety reduction techniques, how to ID triggers for depression and anxiety- decrease reactivity/eliminate, lifestyle changes to reduce depression and anxiety, communication skills, explore cognitive beliefs and help client to develop healthy cognitive patterns and beliefs.      Patient has reviewed and agreed to the above plan.      Ricarda Bhatia [facility]

## 2023-12-11 NOTE — TELEPHONE ENCOUNTER
There is no documentation of needing to be seen by ENT in the chart. Pt has seen Dr Hough in [place] last year. Recommend to follow up with that provider or they would need a referral to be seen in our clinic.  Left VM to discuss with CB number.    Trish Hidalgo LPN

## 2023-12-11 NOTE — COMMUNITY RESOURCES LIST (ENGLISH)
12/11/2023   [facility] ALOH[facility]  N/A  For questions about this resource list or additional care needs, please contact your primary care clinic or care manager.  Phone: [phone]   Email: N/A   Address: [address] [place] [id number]   Hours: N/A        Transportation       Free or low-cost transportation  1  [employer] Distance: 15.59 miles      In-Person   [address] [place] [address]  Language: English  Hours: Mon - Fri 6:00 AM - 6:00 PM  Fees: Insurance, Self Pay   Phone: (163) 127-4177 Email: info@[employer] Website: http://www.[employer]     Transportation to medical appointments  2  [employer] Distance: 15.59 miles      In-Person   [address] [place] [address]  Language: English  Hours: Mon - Fri 6:00 AM - 6:00 PM  Fees: Insurance, Self Pay   Phone: (418) 310-2388 Email: info@[employer] Website: http://www.[employer]     3  [employer] Distance: 21.31 miles      In-Person   [address] [place] [address]  Language: English  Hours: Mon - Fri 4:00 AM - 6:00 PM  Fees: Insurance, Self Pay   Phone: (593) 284-9952 Email: ragini@[employer]          Important Numbers & Websites       Emergency Services   911  [facility]   311  Poison Control   (611) 652-9001  Suicide Prevention Lifeline   (848) 897-9376 (TALK)  Child Abuse Hotline   (147) 936-5736 (4-A-Child)  Sexual Assault Hotline   (860) 857-8945 (HOPE)  National Runaway Safeline   (541) 362-4531 (RUNAWAY)  All-Options Talkline   (352) 673-9455  Substance Abuse Referral   (710) 806-5428 (HELP)

## 2023-12-11 NOTE — TELEPHONE ENCOUNTER
Physical Therapy Daily Treatment     Visit Count: 10  Plan of Care Dates: Initial: 6/15/17 Through: 8/10/17  Insurance Information: medical necessity   Next Referring Provider Visit: prn      Referred by: Jayy Bojorquez DO  Medical Diagnosis (from order):  Shoulder dislocation, right, initial encounter [S43.004A]  - Primary   Insurance: 1. AARP MEDICARE COMPLETE  2. N/A    Date of Onset: new onset; 6/7/17   Diagnosis Precautions: none  Chart reviewed: Relevant co-morbidities, allergies, tests and medications: None     SUBJECTIVE   Current Pain: 1/10  Shoulder was sore this morning. Forced it to get moving this morning- shower, ibuprofen, forced it to move.   Functional Change: Feeling looser in the shoulder. Easier to use the arm. Swelling has improved.  noticed how well the shoulder range of motion was after the pool treatment.  Able to fasten the bra behind her like normal.     OBJECTIVE     Range of Motion (degrees) Norm Left Right Right    Shoulder                    Date   Initial Initial 7/17   Flexion 170-180 153 30 140° standing   Extension 50-60 64 NT    Abduction 170-180 160 30 136° standing   Adduction 50-75 NT NT    Internal Rotation   69° NT 58° scapulothoracic   46° glenohumeral    External  Rotation 80-90 72° NT 63°   standard testing positions unless otherwise noted; Key: ranges are reported in active range of motion unless noted as AA=active assistive or P=passive range of motion, * denotes pain   Comments: Only those motions that were assessed are noted.    Functional ROM:       Left  Right 7/17   Place hand on opposite shoulder Yes Yes- with compensation- no elbow motion away from body    Touch top of head Yes Yes with shoulder hike & mild cervical sidebend    Place hand behind neck T4 T2 with shoulder hike   Place hand behind back T5 Dorsal hand to L4/5   Can hook bra up higher by keeping clasp more right    Over head reach Yes Yes   Comments: NA    Treatment   Therapeutic Exercise:  Pt would like to see Dr Hough. Is aware they are booking out a bit and ok with that. Will have  reach out to the pt. No further questions.    Trish Hidalgo LPN       UBE forward/retro alternating each minute x 6 min manual, level 1   Machines:   Scapular rows 30 lb 2 x 15   Lat pulls 10 lb 2 x 10   Chest press 10 lb 2 x 10   Supine shoulder flexion AAROM concentric, AROM with therapist assist on lower for eccentric      Manual Therapy:   Soft tissue mobilization to right biceps in sitting     Current Home Program (not performed this date except as noted above):   Standing cane abduction, biceps curls (yellow), shoulder external rotation    ASSESSMENT   Progressing strength in nearly all planes. Significant shoulder hike with supine flexion indicative of likely tear in supraspinatus vs proximal biceps.   Pain after treatment: 1/10.     Result of above outlined education: Verbalizes understanding and Demonstrates understanding    Goals:       To be obtained by end of this plan of care:  1. Patient independent with modified and progressed home exercise program. MET - goal ongoing  2. Patient will perform functional external rotation to at least T 1 for washing and grooming hair. MET - discharge goal   3. Patient will perform functional internal rotation to at least T 11 for dressing/clasping bra. NOT MET - continue goal   4. Patient will increase involved shoulder active range of motion to abduction 140°, flexion 140° to aid in normalization of upper extremity movements to aid activities of independent daily living. MET for flexion, progressing for abduction, continue goal   5. Patient will increase involved shoulder strength to 4+/5 to aid in completion of household tasks for independent living, age appropriate activities. NOT ASSESSED   6. DASH: Patient will complete form to reflect an improved score from initial score of 57 to less than or equal to 40 (scored 0-100; a higher score indicates greater disability) to indicate pt reported improvement in function/disability/impairment (minimal detectable change: 15 points). MET- updated: Patient will improve DASH score from 32.5 on  7/17 to less than or equal to 17 points to demonstrate continued functional progress in ADL's.     PLAN   Generalized strength progression, initiate gentle posterior capsule stretching as able.      THERAPY DAILY BILLING   Primary Insurance: AARP MEDICARE COMPLETE  Secondary Insurance: N/A    Evaluation Procedures:  No evaluation codes were used on this date of service    Timed Procedures:  Manual Therapy, 10 minutes  Therapeutic Exercise, 35 minutes    Untimed Procedures:  No untimed codes were used on this date of service    Total Treatment Time: 45 minutes     Physician signature on file.

## 2023-12-12 ENCOUNTER — OFFICE VISIT (OUTPATIENT)
Dept: NEUROSURGERY | Facility: CLINIC | Age: 48
End: 2023-12-12
Attending: NEUROLOGICAL SURGERY
Payer: COMMERCIAL

## 2023-12-12 ENCOUNTER — PRE VISIT (OUTPATIENT)
Dept: NEUROSURGERY | Facility: CLINIC | Age: 48
End: 2023-12-12

## 2023-12-12 VITALS
BODY MASS INDEX: 30.39 KG/M2 | HEART RATE: 97 BPM | WEIGHT: 178 LBS | DIASTOLIC BLOOD PRESSURE: 85 MMHG | SYSTOLIC BLOOD PRESSURE: 119 MMHG | HEIGHT: 64 IN

## 2023-12-12 DIAGNOSIS — M54.41 CHRONIC BILATERAL LOW BACK PAIN WITH BILATERAL SCIATICA: ICD-10-CM

## 2023-12-12 DIAGNOSIS — M54.42 CHRONIC BILATERAL LOW BACK PAIN WITH BILATERAL SCIATICA: ICD-10-CM

## 2023-12-12 DIAGNOSIS — G89.29 CHRONIC BILATERAL LOW BACK PAIN WITH BILATERAL SCIATICA: ICD-10-CM

## 2023-12-12 DIAGNOSIS — M79.18 MYOFASCIAL PAIN: ICD-10-CM

## 2023-12-12 DIAGNOSIS — M99.05 SOMATIC DYSFUNCTION OF PELVIS REGION: Primary | ICD-10-CM

## 2023-12-12 PROCEDURE — 98925 OSTEOPATH MANJ 1-2 REGIONS: CPT | Performed by: PREVENTIVE MEDICINE

## 2023-12-12 PROCEDURE — 99204 OFFICE O/P NEW MOD 45 MIN: CPT | Mod: 25 | Performed by: PREVENTIVE MEDICINE

## 2023-12-12 ASSESSMENT — PAIN SCALES - GENERAL: PAINLEVEL: SEVERE PAIN (7)

## 2023-12-12 NOTE — LETTER
"    12/12/2023         RE: Radha Beckwith  [id number] [address] [address]        Dear Colleague,    Thank you for referring your patient, Radha Beckwith, to the [facility]UROSURG[facility]INIC [place]. Please see a copy of my visit note below.        SUBJECTIVE:  HPI:  Radha Beckwith  Is a 48 year old female who presents today for new patient evaluation of low back pain upon referral from Dr. Azam Arteaga, was suspicious of Pelvic Joint Dysfunction, and whose 9/28/2023 office note records:  \"47 year old female with neck and back pain, cervical and lumbar spondylosis.  6 months of daily throbbing neck pain radiating to the bilateral shoulders.  Also feels some burning and numbness in her hands.  UE EMG was normal.  Also with throbbing low back pain radiating to the left>right lateral thighs.  Worse with standing and activity.  Past bilateral SI joint injections without improvement.  MR Cervical, personally reviewed, with mild spondylotic change, moderate right C4-5 foraminal stenosis.  MR Lumbar, personally reviewed, with mild degenerative change, mild left L5-S1 foraminal stenosis.\"  Neurologic exam was nonfocal and he ordered a cervical JASE    KINDRA 10/20/2023.  Bilateral L5-S1 TFESI 7/1/2022    History today:    Linette says that the epidural was nontherapeutic.  She does get some discomfort in bilateral lateral thighs but mainly the pain is in the posterior pelvic area.  Per pain diagram shows exes of the way up her spine as well as in the bilateral upper quadrants and in the lumbar muscles.  She describes \"electric shock\" sensations that radiate out a few inches on either side of her entire spine.  She has had pain for about 3 years without any inciting event      SYMPTOMS WORSENED WITH bending over retirement, loading and unloading of discharge    SYMPTOMS IMPROVED WITH heating pad, TENS unit    Pain score and diag feeding/water chickens, laundry, vacuumingram reviewed.  See questionnaire. "      ROS: .  Otherwise negative for bowel/bladder dysfunction, balance changes, headache, leg pain/numbness/weakness, fevers, chills, night sweats, unexplained weight loss;  otherwise unremarkable.   See the patient's intake questionnaire from today for details.    Treatment to Date: As above.  Tylenol, gabapentin, baclofen, cyclobenzaprine.  Chiropractic.  No PT for the low back.    MEDICATIONS:  Reviewed.    ALLERGIES:  Reviewed.     Past medical and surgical history:   Pertinent for anxiety, depression, SVT, opioid dependence in remission, smoker, GERD, RLS.  Status post hysterectomy without BSO for DUB.    SOCIAL HX: Unemployed previously customer service.  She is single and has one 26-year-old son.  Sports hobbies and activities: Nothing      OBJECTIVE:    IMAGING: Images and report reviewed.    MRI OF THE LUMBAR SPINE WITHOUT CONTRAST 9/26/2023      COMPARISON: Lumbar spine MRI 3/8/2022.    L3-L4: Loss of disc height, disc desiccation and mild circumferential  disc bulging. No herniation. Moderate facet arthropathy bilaterally.  No spinal canal stenosis. No foraminal stenosis on either side.     L4-L5: Loss of disc height, disc desiccation and circumferential disc  bulging with a superimposed left lateral disc herniation (protrusion).  Mild facet arthropathy bilaterally. No spinal canal stenosis. Mild  left foraminal narrowing. No right foraminal stenosis.     L5-S1: Loss of disc height, disc desiccation and circumferential disc  bulging with a superimposed left paracentral disc herniation  (extrusion). Mild facet arthropathy bilaterally. No spinal canal  stenosis. Mild to moderate left foraminal narrowing. No right  foraminal stenosis.                                                                      IMPRESSION:  1. Degenerative change of the lumbar spine as detailed above without  appreciable change from the comparison study.  2. Left lateral disc herniations at L4-L5 and L5-S1 again noted.  3. Mild to  moderate left foraminal narrowing at L5-S1.  4. No other significant spinal canal or neural foraminal narrowing of  the lumbar spine.    MRI OF THE THORACIC SPINE WITHOUT CONTRAST  9/26/2023                                                                    IMPRESSION:  1. Modic 2 degenerative marrow signal change in the opposing endplates  at T9-T10.  2. Otherwise, normal thoracic spine MRI.    MRI OF THE CERVICAL SPINE WITHOUT CONTRAST 9/26/2023                                                                  IMPRESSION:  1. Diffuse degenerative change of the cervical spine as detailed  above.  2. No spinal canal stenosis of the cervical spine.  3. Moderate neural foraminal stenosis on the right at C4-C5. No other  high-grade neural foraminal narrowing of the cervical spine.         EXAMINATION:    --CONSTITUTIONAL:   No acute distress.  The patient is well nourished and well groomed.  She transitions without pushoff.  She moves hesitantly and slowly about the room.  BMI is elevated.  --SKIN:  Skin over the face, bilateral lower extremities, and posterior torso is clean, dry, intact without rashes.    --GAIT:  is non-antalgic. Flat foot, heel and toe walking:  normal   .  Squat and rise   normal    but only long-term.  --STANDING EXAMINATION:    Symmetry of spine/pelvis   unremarkable   .      Range of motion forward flexion hands to mid shins and painful.  Extension 50% reduced and more painful..   Standing flexion positive right.    Mateusz's sign    positive.     Stork test   negative    .   --NEUROLOGICAL:     ROMBERG, TANDEM WALK, PRONATOR DRIFT:   Normal.   .  SENSATION to light touch is intact in bilateral thighs, lower legs and feet.   REFLEXES:  patellar 2+, and achilles 2+.  Babinski is negative. No clonus.  MANUAL MOTOR TESTING:  L1- S1 Myotomes, Femoral, Obturator, Peroneal and Tibial nerves 5/5   DURAL STRETCH TESTS:  SLR negative.    --PELVIC/HIP JOINTS:                Long Sitting    right leg 1-3 cm  long.    Hip scour   negative   .    Hip Impingement   negative   .   KATHY   negative    .     Piriformis   negative   .   Spring testing tender right greater than left SI joint greater than L5.      PELVIC ALIGNMENT right posterior innominate rotation.  Right posterior sacral torsion.   --LUMBAR/GLUTEAL MUSCLES: Lumbar nontender.  Right greater than left gluteal pain.    --ABDOMINAL:  Non-distended.  Palpation the periumbilical and right lower quadrant and right upper quadrant reproduces right flank and back pain.  No mass.  No organomegaly.  No peritoneal signs.  --VASCULAR: Femoral pulses 2+. Lower extremity capillary refill, temperature and color normal.       Procedure note-OMT:  Manual medicine restore normal pelvic alignment.  She still had tenderness but less so over the right SI joint.  Sacrum corrected on its own but she had decreased compliance of the left SI joint.  Forward flexion was much less painful and she was able to bring.  Fingers to her ankles.  Extension was still painful and limited.  Standing flexion test became negative.  Long sitting test negative with equal leg lengths      ASSESSMENT: Radha Beckwith is a 48 year old female who presents  today for new patient evaluation of:    Chronic low back pain  Pelvic Joint Dysfunction manifesting as a right posterior innominate rotation-right posterior sacral torsion.  Encouraging response to OMT  Some right-sided back pain associated with abdominal palpation  Bilateral L5-S1 TFESI-nontherapeutic      PLAN:  See family doctor to workup abdominal discomfort with a thorough abdominal and pelvic workup-warned.  Pelvic Joint Dysfunction self-correction each morning, start pelvic stabilization PT, and recheck in 4 months.  She is significantly deconditioned and she will have to work hard to regain a lot of loss muscle strength.    Advised patient to call or return early if symptoms worsen, or having problems controlling bladder and bowel function or  worsening leg weakness.     Please note: Voice recognition software was used in this dictation.  It may therefore contain typographical errors.    Guille Rosario MD             Again, thank you for allowing me to participate in the care of your patient.        Sincerely,        Guille Rosario MD

## 2023-12-12 NOTE — NURSING NOTE
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 No.  Work status?   unemployed .    Sports: n  Activities: walking             /85   Pulse 97   Ht 1.626 m (5' 4\")   Wt 80.7 kg (178 lb)   LMP  (LMP Unknown)   BMI 30.55 kg/m        Allergies   Allergen Reactions    Ergotamine-Caffeine      12-            GI problems-    Seasonal Allergies     Sumatriptan      vomits after giving herself a shot    Compazine Anxiety    Droperidol Anxiety    Nubain [Nalbuphine Hcl] Anxiety    Prochlorperazine Palpitations     Uncontrolled movement       Current Outpatient Medications   Medication    acetaminophen (TYLENOL) 500 MG tablet    ALPRAZolam (XANAX) 0.5 MG tablet    baclofen (LIORESAL) 20 MG tablet    bisacodyl (DULCOLAX) 5 MG EC tablet    buprenorphine HCl-naloxone HCl (SUBOXONE) 2-0.5 MG per film    cetirizine (ZYRTEC) 10 MG tablet    cyclobenzaprine (FLEXERIL) 5 MG tablet    DULoxetine (CYMBALTA) 30 MG capsule    famotidine (PEPCID) 40 MG tablet    gabapentin (NEURONTIN) 300 MG capsule    lactulose (CHRONULAC) 10 GM/15ML solution    ondansetron (ZOFRAN ODT) 4 MG ODT tab    pantoprazole (PROTONIX) 40 MG EC tablet    polyethylene glycol (MIRALAX) 17 GM/Dose powder    rOPINIRole (REQUIP) 1 MG tablet    simvastatin (ZOCOR) 10 MG tablet    traZODone (DESYREL) 50 MG tablet    zolpidem (AMBIEN) 5 MG tablet     No current facility-administered medications for this visit.         Darla Severin-Brown, LPN   "

## 2023-12-12 NOTE — PATIENT INSTRUCTIONS
"I am glad you came in to see me so we could identify and start treatment for your Pelvic Joint Dysfunction.  Do you self-correction each morning as described below.  Work hard in therapy and I am looking forward to seeing you back here in 4 months.  See the assessment and plan below for further details of our discussion today.  Make sure you see your family doctor to check your belly and pelvic organs out.  I hope you have a happy holiday season    ASSESSMENT: Radha Beckwith is a 48 year old female who presents  today for new patient evaluation of:    Chronic low back pain  Pelvic Joint Dysfunction manifesting as a right posterior innominate rotation-right posterior sacral torsion.  Encouraging response to OMT  Some right-sided back pain associated with abdominal palpation  Bilateral L5-S1 TFESI-nontherapeutic      PLAN:  See family doctor to workup abdominal discomfort with a thorough abdominal and pelvic workup-warned.  Pelvic Joint Dysfunction self-correction each morning, start pelvic stabilization PT, and recheck in 4 months.  She is significantly deconditioned and she will have to work hard to regain a lot of loss muscle strength.        PELVIC JOINT SELF CORRECTION EXERCISES      It is best to do this first thing in the morning, and can be repeated once or twice during the day.    \"SHOTGUN\" TECHNIQUE:  This loosens up the front and back of the pelvis.  Do this before the Broomstick exercise.  Use on object such as a rectangular laundry basket.  Lie on your back with your knees bent, feet together and flat on the floor.    Spread your knees approximately 12-24 inches around the outside of an upright laundry basket.  Squeeze your knees together, breathing as you do this.    Concentrate on keeping your buttocks relaxed and on the ground.    A brief discomfort in the front of the pelvis and even a popping sound is normal.  Hold the squeeze for 3-5 seconds.    Relax for 3-5 seconds.    Repeat 2 more " times.    Now reverse your knee position to the inside of the upside down laundry basket while still lying with your knees bent up, feet on the ground.  Pull your knees apart, breathing easy as you do this.  Hold the squeeze for 3-5 seconds.    Relax for 3-5 seconds.    Repeat 2 more times.    BROOMSTICK EXERCISE:  Lie on your back with your knees bent.  Slide a broomstick or similar object above one knee, and below the opposite knee.  Firmly hold the stick with your hands will bringing your knees closed to your belly, preferably high enough that your back can rest flat on the ground.  Still holding the stick, scissors your legs against the stick, breathing as you do this.    (Push down to your foot with leg on top of the broomstick, and lift up to your chin with the leg below the broomstick).  Hold the squeeze for 3-5 seconds.    Relax for 3-5 seconds.    Repeat 2 more times.  Switch the position of the broomstick above the other knee, and below the first knee.  Repeat the exercise as above in this new position.

## 2023-12-13 DIAGNOSIS — F41.9 ANXIETY: ICD-10-CM

## 2023-12-13 DIAGNOSIS — G25.81 RESTLESS LEGS SYNDROME (RLS): ICD-10-CM

## 2023-12-14 ENCOUNTER — OFFICE VISIT (OUTPATIENT)
Dept: FAMILY MEDICINE | Facility: CLINIC | Age: 48
End: 2023-12-14
Payer: COMMERCIAL

## 2023-12-14 VITALS
TEMPERATURE: 97.9 F | HEIGHT: 64 IN | RESPIRATION RATE: 18 BRPM | SYSTOLIC BLOOD PRESSURE: 116 MMHG | BODY MASS INDEX: 31.12 KG/M2 | WEIGHT: 182.25 LBS | OXYGEN SATURATION: 97 % | HEART RATE: 115 BPM | DIASTOLIC BLOOD PRESSURE: 70 MMHG

## 2023-12-14 DIAGNOSIS — G25.81 RESTLESS LEGS SYNDROME (RLS): ICD-10-CM

## 2023-12-14 DIAGNOSIS — R10.2 PELVIC PAIN IN FEMALE: ICD-10-CM

## 2023-12-14 DIAGNOSIS — E78.5 HYPERLIPIDEMIA LDL GOAL <100: ICD-10-CM

## 2023-12-14 DIAGNOSIS — Z00.00 ENCOUNTER FOR ANNUAL WELLNESS EXAM IN MEDICARE PATIENT: Primary | ICD-10-CM

## 2023-12-14 DIAGNOSIS — F41.9 ANXIETY: ICD-10-CM

## 2023-12-14 PROCEDURE — 99396 PREV VISIT EST AGE 40-64: CPT | Performed by: NURSE PRACTITIONER

## 2023-12-14 PROCEDURE — 99214 OFFICE O/P EST MOD 30 MIN: CPT | Mod: 25 | Performed by: NURSE PRACTITIONER

## 2023-12-14 RX ORDER — ALPRAZOLAM 0.5 MG
TABLET ORAL
Qty: 60 TABLET | Refills: 0 | Status: SHIPPED | OUTPATIENT
Start: 2023-12-14 | End: 2024-01-12

## 2023-12-14 RX ORDER — ROPINIROLE 1 MG/1
TABLET, FILM COATED ORAL
Qty: 90 TABLET | Refills: 0 | Status: SHIPPED | OUTPATIENT
Start: 2023-12-14 | End: 2024-03-12

## 2023-12-14 ASSESSMENT — ENCOUNTER SYMPTOMS
PARESTHESIAS: 1
NERVOUS/ANXIOUS: 1
HEARTBURN: 1
BREAST MASS: 0
MYALGIAS: 1
HEADACHES: 1

## 2023-12-14 ASSESSMENT — PATIENT HEALTH QUESTIONNAIRE - PHQ9: SUM OF ALL RESPONSES TO PHQ QUESTIONS 1-9: 20

## 2023-12-14 ASSESSMENT — PAIN SCALES - GENERAL: PAINLEVEL: SEVERE PAIN (7)

## 2023-12-14 NOTE — COMMUNITY RESOURCES LIST (ENGLISH)
12/14/2023   [facility]  N/A  For additional resource needs, please contact your health insurance member services or your primary care team.  Phone: [phone]   Email: N/A   Address: [address] [place] [address]   Hours: N/A        Transportation       Free or low-cost transportation  1  [employer] Distance: 15.59 miles      In-Person   [address] [place] [address]  Language: English  Hours: Mon - Fri 6:00 AM - 6:00 PM  Fees: Insurance, Self Pay   Phone: (833) 287-6544 Email: info@[employer] Website: http://www.[employer]     Transportation to medical appointments  2  [employer] Distance: 15.59 miles      In-Person   [address] [place] [address]  Language: English  Hours: Mon - Fri 6:00 AM - 6:00 PM  Fees: Insurance, Self Pay   Phone: (698) 424-1327 Email: info@[employer] Website: http://wwwEn[employer]     3  [employer] Distance: 21.31 miles      In-Person   [address] [place] [address]  Language: English  Hours: Mon - Fri 4:00 AM - 6:00 PM  Fees: Insurance, Self Pay   Phone: (114) 741-8279 Email: [email or web address]@[email or web address]          Important Numbers & Websites       [address]org  Poison Control   (568) 277-6898 [place]  Suicide and Crisis Lifeline   [address] [place], [place] [address]line.org  Childhelp National Child Abuse Hotline   739.686.3129 Childhelphotline.org  National Sexual Assault Hotline   (466) 298-3599 (HOPE) Rainn.org  National Runaway Safeline   (346) 746-4946 (RUNAWAY) [facility] 2001runaway.org  Pregnancy & Postpartum Support [place]   Call/text 327-856-2560 Ppsupport[place].org  Substance Abuse National Helpline ([facility]   085-982-HELP (9624) Findtreatment.gov  Emergency Services   913

## 2023-12-14 NOTE — PROGRESS NOTES
SUBJECTIVE:   Linette is a 48 year old, presenting for the following:  Physical        2023    10:45 AM   Additional Questions   Roomed by Jaycee CLAIRE       Healthy Habits:     Getting at least 3 servings of Calcium per day:  Yes    Bi-annual eye exam:  Yes    Dental care twice a year:  NO    Sleep apnea or symptoms of sleep apnea:  Daytime drowsiness    Diet:  Regular (no restrictions)    Frequency of exercise:  None    Taking medications regularly:  Yes    Medication side effects:  None and Lightheadedness    Additional concerns today:  Yes    Have you ever done Advance Care Planning? (For example, a Health Directive, POLST, or a discussion with a medical provider or your loved ones about your wishes): No, advance care planning information given to patient to review.  Patient declined advance care planning discussion at this time.    Social History     Tobacco Use    Smoking status: Every Day     Packs/day: 0.25     Years: 20.00     Additional pack years: 0.00     Total pack years: 5.00     Types: Cigarettes    Smokeless tobacco: Never   Substance Use Topics    Alcohol use: Yes     Comment: Occasionaly             2023    12:04 PM   Alcohol Use   Prescreen: >3 drinks/day or >7 drinks/week? No     Abdominal pain/ pelvic pain- pain from abdomen to back- has had pains there for awhile.  Sharp RUQ pain    CT abdomen .  Pain when having to have bm.  Has seen GI- endoscopy in October.  Colonoscopy done 10/3/23    Breast Cancer Screenin/23/2021     2:09 PM   Breast CA Risk Assessment (FHS-7)   Do you have a family history of breast, colon, or ovarian cancer? No / Unknown         Mammogram Screening: Recommended annual mammography  Pertinent mammograms are reviewed under the imaging tab.    History of abnormal Pap smear: Status post benign hysterectomy. Health Maintenance and Surgical History updated.      Latest Ref Rng & Units 2017    11:00 AM 2017    10:55 AM   PAP / HPV   PAP  (Historical)   LSIL    HPV 16 DNA NEG Negative     HPV 18 DNA NEG Negative     Other HR HPV NEG Positive       Reviewed and updated as needed this visit by clinical staff   Tobacco  Allergies  Meds              Reviewed and updated as needed this visit by Provider                 Past Medical History:   Diagnosis Date    Abdominal pain, right lower quadrant 03/09/2008    Admit. Discharged 03/10/08    Anxiety attack 07/31/2015    Atypical chest pain 06/23/2015    De Quervain's disease (tenosynovitis)     Dehydration     Depressive disorder 1996    Gastric ulcer 07/31/2015    GERD (gastroesophageal reflux disease) 07/28/2010    Takes omeprazole and metoclopramide     Hypertension 2017    Ingrowing nail 01/09/2014    Migraines     Opioid dependence in remission (H) 08/02/2015    Other and unspecified ovarian cyst     Papanicolaou smear of cervix with low grade squamous intraepithelial lesion (LGSIL) 07/07/2017      Past Surgical History:   Procedure Laterality Date    BIOPSY CERVICAL, LOCAL EXCISION, SINGLE/MULTIPLE N/A 8/10/2017    Procedure: BIOPSY CERVICAL, LOCAL EXCISION, SINGLE/MULTIPLE;;  Surgeon: Michael Chandler MD;  [facility]    COLONOSCOPY N/A 1/6/2023    Procedure: COLONOSCOPY;  Surgeon: Michael Dozier MD;  [facility]    COLPOSCOPY, BIOPSY, COMBINED N/A 8/10/2017    Procedure: COMBINED COLPOSCOPY, BIOPSY;  Colposcopy with Cervical Biopsies and Endometrial Biopsy, Exam with Ultrasound;  Surgeon: Michael Chandler MD;  [facility]    ESOPHAGOSCOPY, GASTROSCOPY, DUODENOSCOPY (EGD), COMBINED N/A 4/17/2017    Procedure: COMBINED ESOPHAGOSCOPY, GASTROSCOPY, DUODENOSCOPY (EGD);  Surgeon: Ibrahima Esposito MD;  [facility]    ESOPHAGOSCOPY, GASTROSCOPY, DUODENOSCOPY (EGD), COMBINED N/A 1/6/2023    Procedure: ESOPHAGOGASTRODUODENOSCOPY, WITH BIOPSY;  Surgeon: Michael Dozier MD;  [facility]    ESOPHAGOSCOPY, GASTROSCOPY, DUODENOSCOPY (EGD), COMBINED N/A 10/3/2023     "Procedure: ESOPHAGOGASTRODUODENOSCOPY, WITH BIOPSY;  Surgeon: John Paul Varela MD;  [facility]    EXAM UNDER ANESTHESIA PELVIC N/A 8/10/2017    Procedure: EXAM UNDER ANESTHESIA PELVIC;;  Surgeon: Michael Chandler MD;  [facility]    HYSTERECTOMY      HYSTERECTOMY, PAP NO LONGER INDICATED      INJECT EPIDURAL CERVICAL N/A 10/20/2023    Procedure: Cervical 6-7 Interlaminar epidural steroid injection using fluoroscopic guidance with contrast dye.;  Surgeon: Trevor Ivory MD;  [facility]    INJECT EPIDURAL LUMBAR Bilateral 7/1/2022    Procedure: Lumbar 5-Sacral 1 Transforaminal Epidural Steroid Injection with fluoroscopic guidance and contrast, bilateral;  Surgeon: Trevor Ivory MD;  [facility]    LAPAROSCOPIC CHOLECYSTECTOMY N/A 2/2/2018    Procedure: LAPAROSCOPIC CHOLECYSTECTOMY;  Laparoscopic Cholecystectomy;  Surgeon: Tigre Lowry DO;  [facility]    LAPAROSCOPIC HYSTERECTOMY TOTAL N/A 10/30/2017    Procedure: LAPAROSCOPIC HYSTERECTOMY TOTAL;  LAPAROSCOPIC HYSTERECTOMY TOTAL POSSIBLE SALPINGO-OOPHERECTOMY (BILATERAL);  Surgeon: Michael Chandler MD;  [facility]    [facility] UGI ENDOSCOPY, SIMPLE EXAM  01/07/08       Review of Systems   HENT:  Positive for ear pain and hearing loss.    Eyes:  Positive for visual disturbance.   Gastrointestinal:  Positive for heartburn.   Breasts:  Positive for tenderness. Negative for breast mass and discharge.   Genitourinary:  Negative for pelvic pain, vaginal bleeding and vaginal discharge.   Musculoskeletal:  Positive for myalgias.   Neurological:  Positive for headaches and paresthesias.   Psychiatric/Behavioral:  The patient is nervous/anxious.           OBJECTIVE:   /70   Pulse 115   Temp 97.9  F (36.6  C) (Temporal)   Resp 18   Ht 1.63 m (5' 4.17\")   Wt 82.7 kg (182 lb 4 oz)   LMP  (LMP Unknown)   SpO2 97%   BMI 31.11 kg/m    Physical Exam  GENERAL: healthy, alert and no distress  EYES: Eyes grossly " normal to inspection, PERRL and conjunctivae and sclerae normal  HENT: ear canals and TM's normal, nose and mouth without ulcers or lesions  NECK: no adenopathy, no asymmetry, masses, or scars and thyroid normal to palpation  RESP: lungs clear to auscultation - no rales, rhonchi or wheezes  CV: regular rate and rhythm, normal S1 S2, no S3 or S4, no murmur, click or rub, no peripheral edema and peripheral pulses strong  ABDOMEN: tenderness RLQ and bowel sounds normal  MS: no gross musculoskeletal defects noted, no edema  SKIN: no suspicious lesions or rashes  NEURO: Normal strength and tone, mentation intact and speech normal  PSYCH: mentation appears normal, affect normal/bright      ASSESSMENT/PLAN:   (Z00.00) Encounter for annual wellness exam in Medicare patient  (primary encounter diagnosis)  Comment: recommend yearly physicals    (F41.9) Anxiety  Comment: patient/ therapist request recheck labs.    She has been on the 1mg twice daily in the past and she came to me on suboxone, seroquel, ambien, flexeril.  Discussed risks of this mixture of medications  she has decreased to xanax 0.5mg twice a day with some success.  Avoid increase in the future- or could see Psychiatry again  Plan: TSH with free T4 reflex, Vitamin D Deficiency,         Ferritin          (E78.5) Hyperlipidemia LDL goal <100  Comment: check fasting labs  Plan: Lipid panel reflex to direct LDL Fasting    (R10.2) Pelvic pain in female  Comment: will obtain pelvic us  Plan: US Pelvic Complete with Transvaginal    (G25.81) Restless legs syndrome (RLS)  Comment: check labs on requip  Plan: Basic metabolic panel  (Ca, Cl, CO2, Creat,         Gluc, K, Na, BUN), Vitamin B12, Magnesium,         Folate        Patient has been advised of split billing requirements and indicates understanding: Yes      COUNSELING:  Reviewed preventive health counseling, as reflected in patient instructions        She reports that she has been smoking cigarettes. She has a  5.00 pack-year smoking history. She has never used smokeless tobacco.  Nicotine/Tobacco Cessation Plan:   Information offered: Patient not interested at this time          Gisselle Linn NP  [facility]

## 2023-12-14 NOTE — COMMUNITY RESOURCES LIST (ENGLISH)
12/14/2023   [facility]  N/A  For additional resource needs, please contact your health insurance member services or your primary care team.  Phone: [phone]   Email: N/A   Address: [address] [place] [id number]   Hours: N/A        Transportation       Free or low-cost transportation  1  [employer] Distance: 15.59 miles      In-Person   [address] [place] [address]  Language: English  Hours: Mon - Fri 6:00 AM - 6:00 PM  Fees: Insurance, Self Pay   Phone: (556) 223-6743 Email: info@[employer] Website: http://www.[employer]     Transportation to medical appointments  2  [employer] Distance: 15.59 miles      In-Person   [address] [place] [address]  Language: English  Hours: Mon - Fri 6:00 AM - 6:00 PM  Fees: Insurance, Self Pay   Phone: (588) 713-2295 Email: info@[employer] Website: http://wwwGood World Games     3  [employer] Distance: 21.31 miles      In-Person   [address] [place] [address]  Language: English  Hours: Mon - Fri 4:00 AM - 6:00 PM  Fees: Insurance, Self Pay   Phone: (859) [phone] Email: [email or web address]@[email or web address]man [email or web address]          Important Numbers & Websites       [address]org  Poison Control   (235) 278-2107 [place]  Suicide and Crisis Lifeline   [address] [place], [place] [address]line.org  Childhelp National Child Abuse Hotline   130.275.3760 Childhelphotline.org  National Sexual Assault Hotline   (273) 911-7825 (HOPE) Rainn.org  National Runaway Safeline   (380) 614-1249 (RUNAWAY) [facility]  Pregnancy & Postpartum Support [place]   Call/text 184-566-2279 Ppsupport[place].org  Substance Abuse National Helpline ([facility]   153-832-HELP (9208) Findtreatment.gov  Emergency Services   917

## 2023-12-18 ENCOUNTER — VIRTUAL VISIT (OUTPATIENT)
Dept: PSYCHOLOGY | Facility: CLINIC | Age: 48
End: 2023-12-18
Payer: COMMERCIAL

## 2023-12-18 DIAGNOSIS — F33.1 MODERATE EPISODE OF RECURRENT MAJOR DEPRESSIVE DISORDER (H): Primary | ICD-10-CM

## 2023-12-18 DIAGNOSIS — F41.1 GENERALIZED ANXIETY DISORDER: ICD-10-CM

## 2023-12-18 PROCEDURE — 90834 PSYTX W PT 45 MINUTES: CPT | Mod: VID | Performed by: COUNSELOR

## 2023-12-18 ASSESSMENT — PATIENT HEALTH QUESTIONNAIRE - PHQ9
SUM OF ALL RESPONSES TO PHQ QUESTIONS 1-9: 19
10. IF YOU CHECKED OFF ANY PROBLEMS, HOW DIFFICULT HAVE THESE PROBLEMS MADE IT FOR YOU TO DO YOUR WORK, TAKE CARE OF THINGS AT HOME, OR GET ALONG WITH OTHER PEOPLE: EXTREMELY DIFFICULT
SUM OF ALL RESPONSES TO PHQ QUESTIONS 1-9: 19

## 2023-12-18 NOTE — PROGRESS NOTES
Answers submitted by the patient for this visit:  Patient Health Questionnaire (Submitted on 12/18/2023)  If you checked off any problems, how difficult have these problems made it for you to do your work, take care of things at home, or get along with other people?: Extremely difficult  PHQ9 TOTAL SCORE: 19  Answers submitted by the patient for this visit:  BO-7 (Submitted on 12/10/2023)  BO 7 TOTAL SCORE: 21          [facility] [place] [facility]                                     Progress Note    Patient Name: Radha Beckwith  Date: 12/18/23         Service Type: Individual      Session Start Time: 3:30pm Session End Time: 4:20pm     Session Length: 50 mins    Session #: 19    Attendees: Client    Service Modality:  Video Visit:      Provider verified identity through the following two step process.  Patient provided:  Patient is known previously to provider    Telemedicine Visit: The patient's condition can be safely assessed and treated via synchronous audio and visual telemedicine encounter.      Reason for Telemedicine Visit: Patient convenience (e.g. access to timely appointments / distance to available provider)    Originating Site (Patient Location): Patient's home    Distant Site (Provider Location): Provider Remote Setting- Home Office    Consent:  The patient/guardian has verbally consented to: the potential risks and benefits of telemedicine (video visit) versus in person care; bill my insurance or make self-payment for services provided; and responsibility for payment of non-covered services.     Patient would like the video invitation sent by:  My Chart    Mode of Communication:  Video Conference via Amwell    Distant Location (Provider):  On-site    As the provider I attest to compliance with applicable laws and regulations related to telemedicine.    DATA  Interactive Complexity: No  Crisis: No        Progress Since Last Session (Related to Symptoms / Goals / Homework):   Symptoms: No change  .    Homework: Completed in session      Episode of Care Goals: Minimal progress - ACTION (Actively working towards change); Intervened by reinforcing change plan / affirming steps taken     Current / Ongoing Stressors and Concerns:   The client stated she just got back from the eye doctor today and is getting new glasses.   Her son didn't make it home on the 16th. She isn't sure if the problems he had with his travel plans had anything to do with him actually not wanting to come home.   Saw her PCP and her spine doctor. Her spine doctor pushed on her back and it hurt on her spine on both sides, which is irregular. He recommended she get a full physical and all vitals checked.          Treatment Objective(s) Addressed in This Session:   Increase interest, engagement, and pleasure in doing things  Feel less tired and more energy during the day        Intervention:   Discussed the various things she plans on talking with her doctor about. Validated her frustrations about her medications and unanswered questions. Talked through the frustrations she's having with her son and him traveling home.      Assessments completed prior to visit:  The following assessments were completed by patient for this visit:  PHQ9:       10/24/2023    11:19 AM 10/30/2023    12:22 PM 11/13/2023     2:27 PM 11/19/2023     9:30 AM 11/26/2023    10:37 PM 12/14/2023    10:47 AM 12/18/2023    11:01 AM   PHQ-9 SCORE   PHQ-9 Total Score MyChart 15 (Moderately severe depression) 14 (Moderate depression) 24 (Severe depression) 20 (Severe depression) 20 (Severe depression)  19 (Moderately severe depression)   PHQ-9 Total Score 15 14 24 20 20 20 19     GAD7:       8/1/2023    12:15 PM 9/4/2023    10:01 PM 9/19/2023     9:19 AM 10/4/2023    12:32 PM 10/30/2023    12:23 PM 11/19/2023     9:32 AM 12/10/2023     4:50 AM   BO-7 SCORE   Total Score 19 (severe anxiety) 19 (severe anxiety) 18 (severe anxiety) 19 (severe anxiety) 18 (severe anxiety) 16 (severe  anxiety) 21 (severe anxiety)   Total Score 19 19 18    18 19 18    18 16 21     PROMIS 10-Global Health (all questions and answers displayed):       2/7/2023    12:57 PM 4/17/2023     2:12 PM 6/25/2023     2:45 PM 8/10/2023    12:50 PM 11/10/2023     9:20 PM 11/19/2023     9:34 AM 11/26/2023    10:43 PM   PROMIS 10   In general, would you say your health is:  Poor Poor Fair Fair Poor Fair   In general, would you say your quality of life is:  Poor Fair Poor Fair Poor Poor   In general, how would you rate your physical health?  Poor Poor Poor Fair Poor Poor   In general, how would you rate your mental health, including your mood and your ability to think?  Fair Fair Poor Fair Poor Poor   In general, how would you rate your satisfaction with your social activities and relationships?  Fair Poor Fair Fair Poor Poor   In general, please rate how well you carry out your usual social activities and roles  Fair Poor Poor Fair Poor Fair   To what extent are you able to carry out your everyday physical activities such as walking, climbing stairs, carrying groceries, or moving a chair?  A little Moderately A little Moderately Mostly A little   In the past 7 days, how often have you been bothered by emotional problems such as feeling anxious, depressed, or irritable?  Always Always Always Often Always Always   In the past 7 days, how would you rate your fatigue on average?  Moderate Severe Severe Severe Severe Severe   In the past 7 days, how would you rate your pain on average, where 0 means no pain, and 10 means worst imaginable pain?  8 7 7 7 7 7   In general, would you say your health is:  1    1 1 2 2 1 2   In general, would you say your quality of life is:  1    1 2 1 2 1 1   In general, how would you rate your physical health?  1    1 1 1 2 1 1   In general, how would you rate your mental health, including your mood and your ability to think?  2    2 2 1 2 1 1   In general, how would you rate your satisfaction with your  social activities and relationships?  2    2 1 2 2 1 1   In general, please rate how well you carry out your usual social activities and roles. (This includes activities at home, at work and in your community, and responsibilities as a parent, child, spouse, employee, friend, etc.)  2    2 1 1 2 1 2   To what extent are you able to carry out your everyday physical activities such as walking, climbing stairs, carrying groceries, or moving a chair?  2    2 3 2 3 4 2   In the past 7 days, how often have you been bothered by emotional problems such as feeling anxious, depressed, or irritable?  5    5 5 5 4 5 5   In the past 7 days, how would you rate your fatigue on average?  3    3 4 4 4 4 4   In the past 7 days, how would you rate your pain on average, where 0 means no pain, and 10 means worst imaginable pain?  8    8 7 7 7 7 7   Global Mental Health Score  6    6 6 5 8 4 4   Global Physical Health Score  8    8 8 7 9 9 7   PROMIS TOTAL - SUBSCORES  14    14 14 12 17 13 11       Information is confidential and restricted. Go to Review Flowsheets to unlock data.    Multiple values from one day are sorted in reverse-chronological order     [place] Suicide Severity Rating Scale (Lifetime/Recent)      10/4/2022    11:00 AM   [place] Suicide Severity Rating (Lifetime/Recent)   Q1 Wished to be Dead (Past Month) no   Q2 Suicidal Thoughts (Past Month) no   Q3 Suicidal Thought Method no   Q4 Suicidal Intent without Specific Plan no   Q5 Suicide Intent with Specific Plan yes   Q6 Suicide Behavior (Lifetime) yes   Within the Past 3 Months? no   Level of Risk per Screen high risk         ASSESSMENT: Current Emotional / Mental Status (status of significant symptoms):   Risk status (Self / Other harm or suicidal ideation)   Patient denies current fears or concerns for personal safety.   Patient denies current or recent suicidal ideation or behaviors.   Patient denies current or recent homicidal ideation or behaviors.   Patient  denies current or recent self injurious behavior or ideation.   Patient denies other safety concerns.   Patient reports there has been no change in risk factors since their last session.     Patient reports there has been no change in protective factors since their last session.     Recommended that patient call 911 or go to the local ED should there be a change in any of these risk factors.     Appearance:   Appropriate    Eye Contact:   Good    Psychomotor Behavior: Normal    Attitude:   Cooperative    Orientation:   All   Speech    Rate / Production: Normal/ Responsive Normal     Volume:  Normal    Mood:    Normal   Affect:    Appropriate    Thought Content:  Clear    Thought Form:  Coherent  Logical    Insight:    Good      Medication Review:   No changes to current psychiatric medication(s)     Medication Compliance:   Yes     Changes in Health Issues:   None reported     Chemical Use Review:   Substance Use: Chemical use reviewed, no active concerns identified      Tobacco Use: No change in amount of tobacco use since last session.  Patient declined discussion at this time    Diagnosis:  1. Moderate episode of recurrent major depressive disorder (H)    2. Generalized anxiety disorder          Collateral Reports Completed:   Not Applicable    PLAN: (Patient Tasks / Therapist Tasks / Other)  Continue to work on stress reduction skills. Utilize communication skills with her son if he does get to come home.         Ricarda Bhatia, [facility]                                                         ______________________________________________________________________    Individual Treatment Plan    Patient's Name: Radha Beckwith  YOB: 1975    Date of Creation: 6/28/23  Date Treatment Plan Last Reviewed/Revised: 12/7/23    DSM5 Diagnoses: 296.32 (F33.1) Major Depressive Disorder, Recurrent Episode, Moderate _  Psychosocial / Contextual Factors: living with boyfriend, memory issues  PROMIS (reviewed  every 90 days):     Referral / Collaboration:  Referral to another professional/service is not indicated at this time..    Anticipated number of session for this episode of care: 9-12 sessions  Anticipation frequency of session:  as needed  Anticipated Duration of each session: 38-52 minutes  Treatment plan will be reviewed in 90 days or when goals have been changed.       MeasurableTreatment Goal(s) related to diagnosis / functional impairment(s)  Goal 1: Patient will increase communication skills with family members.    Objective #A (Patient Action)    Patient will learn & utilize at least 2 assertive communication skills weekly.  Status: Continued - Date(s): 12/7/23     Intervention(s)  Therapist will teach emotional regulation skills. distress tolerance skills, interpersonal effectiveness skills, emotion regluation skills, mindfulness skills, radical acceptance. Therapist will teach client how to ID body cues for anxiety, anxiety reduction techniques, how to ID triggers for depression and anxiety- decrease reactivity/eliminate, lifestyle changes to reduce depression and anxiety, communication skills, explore cognitive beliefs and help client to develop healthy cognitive patterns and beliefs.    Objective #B  Patient will use thought-stopping strategy daily to reduce intrusive thoughts.  Status: Continued - Date(s): 12/7/23    Intervention(s)  Therapist will teach emotional regulation skills. distress tolerance skills, interpersonal effectiveness skills, emotion regluation skills, mindfulness skills, radical acceptance. Therapist will teach client how to ID body cues for anxiety, anxiety reduction techniques, how to ID triggers for depression and anxiety- decrease reactivity/eliminate, lifestyle changes to reduce depression and anxiety, communication skills, explore cognitive beliefs and help client to develop healthy cognitive patterns and beliefs.      Goal 2: Patient will decrease depression  symptoms.      Objective #A (Patient Action)    Status: Continued - Date(s): 12/7/23    Patient will Increase interest, engagement, and pleasure in doing things  Decrease frequency and intensity of feeling down, depressed, hopeless  Improve quantity and quality of night time sleep / decrease daytime naps  Feel less tired and more energy during the day   Improve diet, appetite, mindful eating, and / or meal planning  Identify negative self-talk and behaviors: challenge core beliefs, myths, and actions  Improve concentration, focus, and mindfulness in daily activities   Feel less fidgety, restless or slow in daily activities / interpersonal interactions.    Intervention(s)  Therapist will teach emotional regulation skills. distress tolerance skills, interpersonal effectiveness skills, emotion regluation skills, mindfulness skills, radical acceptance. Therapist will teach client how to ID body cues for anxiety, anxiety reduction techniques, how to ID triggers for depression and anxiety- decrease reactivity/eliminate, lifestyle changes to reduce depression and anxiety, communication skills, explore cognitive beliefs and help client to develop healthy cognitive patterns and beliefs.    Objective #B  Patient will identify three distraction and diversion activities and use those activities to decrease level of anxiety  .    Status: Continued - Date(s):  12/7/23    Intervention(s)  Therapist will teach emotional regulation skills. distress tolerance skills, interpersonal effectiveness skills, emotion regluation skills, mindfulness skills, radical acceptance. Therapist will teach client how to ID body cues for anxiety, anxiety reduction techniques, how to ID triggers for depression and anxiety- decrease reactivity/eliminate, lifestyle changes to reduce depression and anxiety, communication skills, explore cognitive beliefs and help client to develop healthy cognitive patterns and beliefs.      Patient has reviewed and agreed  to the above plan.      Ricarda Bhatia [facility]

## 2023-12-31 ASSESSMENT — PAIN SCALES - PAIN ENJOYMENT GENERAL ACTIVITY SCALE (PEG)
AVG_PAIN_PASTWEEK: 7
INTERFERED_GENERAL_ACTIVITY: 8
INTERFERED_ENJOYMENT_LIFE: 8
PEG_TOTALSCORE: 7.67

## 2024-01-02 DIAGNOSIS — F41.9 ANXIETY: ICD-10-CM

## 2024-01-02 DIAGNOSIS — R11.0 NAUSEA: ICD-10-CM

## 2024-01-02 ASSESSMENT — ANXIETY QUESTIONNAIRES
6. BECOMING EASILY ANNOYED OR IRRITABLE: NEARLY EVERY DAY
GAD7 TOTAL SCORE: 20
GAD7 TOTAL SCORE: 20
2. NOT BEING ABLE TO STOP OR CONTROL WORRYING: NEARLY EVERY DAY
5. BEING SO RESTLESS THAT IT IS HARD TO SIT STILL: MORE THAN HALF THE DAYS
7. FEELING AFRAID AS IF SOMETHING AWFUL MIGHT HAPPEN: NEARLY EVERY DAY
3. WORRYING TOO MUCH ABOUT DIFFERENT THINGS: NEARLY EVERY DAY
1. FEELING NERVOUS, ANXIOUS, OR ON EDGE: NEARLY EVERY DAY
GAD7 TOTAL SCORE: 20
7. FEELING AFRAID AS IF SOMETHING AWFUL MIGHT HAPPEN: NEARLY EVERY DAY
IF YOU CHECKED OFF ANY PROBLEMS ON THIS QUESTIONNAIRE, HOW DIFFICULT HAVE THESE PROBLEMS MADE IT FOR YOU TO DO YOUR WORK, TAKE CARE OF THINGS AT HOME, OR GET ALONG WITH OTHER PEOPLE: EXTREMELY DIFFICULT
8. IF YOU CHECKED OFF ANY PROBLEMS, HOW DIFFICULT HAVE THESE MADE IT FOR YOU TO DO YOUR WORK, TAKE CARE OF THINGS AT HOME, OR GET ALONG WITH OTHER PEOPLE?: EXTREMELY DIFFICULT
4. TROUBLE RELAXING: NEARLY EVERY DAY

## 2024-01-02 NOTE — PROGRESS NOTES
DISCHARGE  Reason for Discharge: Patient has failed to schedule further appointments.    Equipment Issued: band    Discharge Plan: Patient to continue home program.  Recheck with provider - recommend    Referring Provider:  Jenny DENNIS CNP     11/15/23 0500   Appointment Info   Signing clinician's name / credentials Meme Peacock, PT LAT [facility]   Visits Used 13   Medical Diagnosis Pain of both sacroiliac joints (M53.3)  - Primary     Chronic neck pain (M54.2, G89.29)     Myofascial pain (M79.18)     Chronic bilateral thoracic back pain (M54.6, G89.29)     Chronic bilateral low back pain with bilateral sciatica (M54.42, M54.41, G89.29)   PT Tx Diagnosis Chronic pain - neck and low back   Precautions/Limitations Hypersensitive system   Other pertinent information Radha has been seen in PT in the past. Here for chronic pain specific concerns neck and back. Hypersensitivity. Just had 25 teeth pulled and is in pain from this. Opted to focus on low back this session and neck assessment next session.   Quick Adds Certification   Progress Note/Certification   Start of Care Date 06/15/23   Onset of illness/injury or Date of Surgery 10/01/20  (Approximately  and has had chronic pain for years)   Therapy Frequency 2 times per week   Predicted Duration 8 weeks   Certification date from 10/12/23   Certification date to 11/08/23   Progress Note Due Date 11/08/23   Progress Note Completed Date 10/12/23       Present No   PT Goal 1   Goal Identifier Calm symptoms   Goal Description Use pain science concepts including pacing, posture, positioning and graded exposure to develop a list of tools that will calm symptoms allowing her to be active 10 minutes before symptoms escalate   Goal Progress feels PT has not been beneficial, had significant increase in her pain day after chiropactic treatment   Target Date 11/30/23   PT Goal 2   Goal Identifier Posture   Goal Description Linette wants to be more  consistant with posture and what she needs to mind posture more frequently - will sit at desk with good posture 50% of the time   Goal Progress feels she is improving with this and more aware   Target Date 11/30/23   PT Goal 3   Goal Identifier Relaxation   Goal Description Relax shoulders and neck so she can move easier for looking around environment and to be in healthful posture   Goal Progress reports she has started an jn and this helps with exercise and relaxation   Target Date 11/30/23  (changed date as she is trying but too much going on)   Subjective Report   Subjective Report Chiropractor: vibrator, adjustments, massage - thursday 11-9, increased burning in CT junction, down back and into traps on Sat after long drive. Symptoms from legs into her head. went to ER Sunday night. Had been doing PT exercises until this episode. Went on line and started cervical retraction wtih flexion, looking up and tipping head, following jn for anxiety adding scapular elevation. SIJ injections 2 weeks after the last session. SIJ not helping, neck: not helping. Spine doctor appt in Dec. Dr. Arteaga is recommending nerve block.   Objective Measures   Objective Measures Objective Measure 5   Objective Measure 1   Objective Measure ALBERT   Details 26-9-57.78   Objective Measure 2   Objective Measure MICHELLE (9-5)   Details 5-8 high   Objective Measure 3   Objective Measure NDI (10-30-30-60)   Details 10-28-28-36   Objective Measure 5   Details CT junction, down spine, up from feet, headaches and symptoms spreading since chiro treatment   Treatment Interventions (PT)   Interventions Neuromuscular Re-education   Neuromuscular Re-education   Neuromuscular re-ed of mvmt, balance, coord, kinesthetic sense, posture, proprioception minutes ([id number]) 54   Neuro Re-ed 1 home program/ positioning, posture   Neuro Re-ed 1 - Details reviewed her home program, education on different ideas to be more active, lifestyle and pain influences, feels her  finanaces are concerning as well as the pain. reviewed sensitivity. Discussed apps she is using and agreed that she is already monitoring and completing a a home program. Recommended she keep a schedule and continue small steps forward.   Skilled Intervention recommendations, education, progression   Patient Response/Progress understood   Education   Learner/Method Patient;Listening  (forgets long term)   Education Comments continue current activities   Plan   Home program sitting: break up with standing, walking, use of lumbar roll, Mature organism model intro, cervical retraction - supine every 1-2 hours and before symptoms increase, alternate TNS placement technique, lifting properly with kinesiotape in 2 mechanical strips, scap, chakra connectionular sets, 90-90 position   Updates to plan of care hold x 2 weeks and then call   Comments   Comments keep chart open x 2 weeks. She is seeing other care providers. She had some improvement with her NDI and ALBERT. OVerall she does not feel PT was helpful.   Total Session Time   Timed Code Treatment Minutes 54   Total Treatment Time (sum of timed and untimed services) 54

## 2024-01-03 ENCOUNTER — OFFICE VISIT (OUTPATIENT)
Dept: PALLIATIVE MEDICINE | Facility: CLINIC | Age: 49
End: 2024-01-03
Payer: COMMERCIAL

## 2024-01-03 VITALS — DIASTOLIC BLOOD PRESSURE: 69 MMHG | HEART RATE: 97 BPM | SYSTOLIC BLOOD PRESSURE: 114 MMHG

## 2024-01-03 DIAGNOSIS — M54.42 CHRONIC BILATERAL LOW BACK PAIN WITH BILATERAL SCIATICA: ICD-10-CM

## 2024-01-03 DIAGNOSIS — M54.6 CHRONIC BILATERAL THORACIC BACK PAIN: ICD-10-CM

## 2024-01-03 DIAGNOSIS — M54.2 CHRONIC NECK PAIN: ICD-10-CM

## 2024-01-03 DIAGNOSIS — M54.41 CHRONIC BILATERAL LOW BACK PAIN WITH BILATERAL SCIATICA: ICD-10-CM

## 2024-01-03 DIAGNOSIS — G89.29 CHRONIC BILATERAL THORACIC BACK PAIN: ICD-10-CM

## 2024-01-03 DIAGNOSIS — M54.16 LUMBAR RADICULOPATHY: ICD-10-CM

## 2024-01-03 DIAGNOSIS — M54.10 RADICULAR PAIN OF UPPER EXTREMITY: ICD-10-CM

## 2024-01-03 DIAGNOSIS — M54.12 CERVICAL RADICULOPATHY: Primary | ICD-10-CM

## 2024-01-03 DIAGNOSIS — M53.3 SI (SACROILIAC) JOINT DYSFUNCTION: ICD-10-CM

## 2024-01-03 DIAGNOSIS — M79.18 MYOFASCIAL PAIN: ICD-10-CM

## 2024-01-03 DIAGNOSIS — G89.29 CHRONIC BILATERAL LOW BACK PAIN WITH BILATERAL SCIATICA: ICD-10-CM

## 2024-01-03 DIAGNOSIS — G89.29 CHRONIC NECK PAIN: ICD-10-CM

## 2024-01-03 PROCEDURE — 99214 OFFICE O/P EST MOD 30 MIN: CPT

## 2024-01-03 RX ORDER — DULOXETIN HYDROCHLORIDE 30 MG/1
CAPSULE, DELAYED RELEASE ORAL
Qty: 270 CAPSULE | Refills: 0 | Status: SHIPPED | OUTPATIENT
Start: 2024-01-03 | End: 2024-04-09

## 2024-01-03 RX ORDER — ONDANSETRON 4 MG/1
TABLET, ORALLY DISINTEGRATING ORAL
Qty: 20 TABLET | Refills: 1 | Status: SHIPPED | OUTPATIENT
Start: 2024-01-03 | End: 2024-03-26

## 2024-01-03 ASSESSMENT — PAIN SCALES - GENERAL: PAINLEVEL: SEVERE PAIN (7)

## 2024-01-03 NOTE — PATIENT INSTRUCTIONS
Pain Physical Therapy:  She will be starting PT again soon. Continue activity as tolerated at home.      Pain Psychologist to address relaxation, behavioral change, coping style, and other factors important to improvement.  NO     Diagnostic Studies:  None      Medication Management:   Duloxetine - currently takes 60 mg daily. She appreciates benefit to some extent, advised to trial higher dose. Continue 60 mg in morning and start 30 mg at bedtime. Monitor for changes in pain levels/intensity. Updated prescription sent into pharmacy today.   Gabapentin - current dose 600 mg three times daily achieved. Marginal benefit with dosage increase, no major side effects. Will likely taper down/off at next visit, given lack of efficacy. Duloxetine adjustments made today to target increased pain, did not recommend multiple changes at the same time.   Continue baclofen 20 mg twice daily.  She reports small improvement after starting baclofen. Will plan to continue current dose for now.   Flexeril - continue 5-10 mg twice daily as needed. She takes in morning and afternoon, appreciates benefit for significant myofascial tightness.   Allow 4-6 hours in between all muscle relaxant doses, avoid concurrent alcohol use.   Medical cannabis - certified for [place] medical cannabis program today. Advised on lack of FDA approval/regulation, cannabis products not covered by insurance. Recommend frequent follow up with the dispensary over the next several months to explore different products, until she finds products that help.      Potential procedures:   Defer to neurosurgery - she saw Dr. Arteaga for neck pain, recently referred to Dr. Rosario for non-surgical tx options for low back pain.      Other Orders/Referrals:   Recommend using TENS unit a few times throughout the day as needed, particularly when engaging in activities that may increase pain.      Follow up with JEANNIE Schroeder CNP in 6 weeks      ----------------------------------------------------------------  [facility] Number:  [phone]   Call with any questions about your care and for scheduling assistance.   Calls are returned Monday through Friday between 8 AM and 4:30 PM. We usually get back to you within 2 business days depending on the issue/request.    If we are prescribing your medications:  For opioid medication refills, call the clinic or send a [employer] message 7 days in advance.  Please include:  Name of requested medication  Name of the pharmacy.  For non-opioid medications, call your pharmacy directly to request a refill. Please allow 3-4 days to be processed.   Per [place] State Law:  All controlled substance prescriptions must be filled within 30 days of being written.    For those controlled substances allowing refills, pickup must occur within 30 days of last fill.      We believe regular attendance is key to your success in our program!    Any time you are unable to keep your appointment we ask that you call us at least 24 hours in advance to cancel.This will allow us to offer the appointment time to another patient.   Multiple missed appointments may lead to dismissal from the clinic.

## 2024-01-03 NOTE — PROGRESS NOTES
[facility]     Date of visit: 1/3/2024      Assessment:   Radha Beckwith is a 48 year old female with a past medical history significant for chronic bilateral low back pain, depression, SVT, opioid dependence in remission, anxiety, tobacco use, GERD, restless legs who presents with complaints of neck pain, mid and low back.      Low back pain - Onset of pain within last 2 years. Pain is mostly axial, though she does have intermittent radicular leg symptoms, occasionally extends below the knee. On exam, positive facet loading and positive KATHY, sacral thrust and Yeoman's bilaterally. Positive myofascial TTP, negative SLR and neuro exam WNL. Etiology is likely associated with multiple factors, including underlying degenerative changes of lumbar spine, facet and SIJ mediated components, with prominent overlying myofascial component.   Neck/thoracic back pain - Onset of pain within last 2 years. On exam, positive for myofascial TTP, most notably in lower thoracic paraspinals. Etiology is likely associated with multiple factors, including underlying degenerative changes, with prominent overlying myofascial component.      Assigned to [place] nursing team.     Visit Diagnoses:  1. Cervical radiculopathy    2. Lumbar radiculopathy    3. Chronic neck pain    4. Chronic bilateral thoracic back pain    5. Chronic bilateral low back pain with bilateral sciatica    6. Myofascial pain    7. SI (sacroiliac) joint dysfunction    8. Radicular pain of upper extremity        Plan:  Diagnosis reviewed, treatment option addressed, and risk/benifits discussed.  Self-care instructions given.  I am recommending a multidisciplinary treatment plan to help this patient better manage their pain.      Pain Physical Therapy:  She will be starting PT again soon. Continue activity as tolerated at home.      Pain Psychologist to address relaxation, behavioral change, coping style, and other factors important to  improvement.  NO     Diagnostic Studies:  None      Medication Management:   Duloxetine - currently takes 60 mg daily. She appreciates benefit to some extent, advised to trial higher dose. Continue 60 mg in morning and start 30 mg at bedtime. Monitor for changes in pain levels/intensity. Updated prescription sent into pharmacy today.   Gabapentin - current dose 600 mg three times daily achieved. Marginal benefit with dosage increase, no major side effects. Will likely taper down/off at next visit, given lack of efficacy. Duloxetine adjustments made today to target increased pain, did not recommend multiple changes at the same time.   Continue baclofen 20 mg twice daily.  She reports small improvement after starting baclofen. Will plan to continue current dose for now.   Flexeril - continue 5-10 mg twice daily as needed. She takes in morning and afternoon, appreciates benefit for significant myofascial tightness.   Allow 4-6 hours in between all muscle relaxant doses, avoid concurrent alcohol use.   Medical cannabis - [place] medical [place] today. Advised on lack of FDA approval/regulation, cannabis products not covered by insurance. Recommend frequent follow up with the dispensary over the next several months to explore different products, until she finds products that help.      Potential procedures:   Defer to neurosurgery - she saw Dr. Arteaga for neck pain, recently referred to Dr. Rosario for non-surgical tx options for low back pain.      Other Orders/Referrals:   Recommend using TENS unit a few times throughout the day as needed, particularly when engaging in activities that may increase pain.      Follow up with JEANNIE Schroeder CNP in 6 weeks       Review of Electronic Chart: Today I have also reviewed available medical information in the patient's medical record at [facility] and Care Everywhere (if available), including relevant provider notes, laboratory work, and imaging.      Jenny Landrum, NAOMI, APRN, AGNP-C  [facility]     -------------------------------------------------    Subjective:    Chief complaint:   Chief Complaint   Patient presents with    Pain       Interval history:  Radha Beckwith is a 48 year old female last seen on 10/4/23.  They are a patient of mine seen in follow up.     Recommendations/plan at the last visit included:  Pain Physical Therapy:  YES   She is working with [facility], though she noted at prior visit therapy has been somewhat limited due to hypersensitivity to touch.  Recommend continue working with pain PT.     Pain Psychologist to address relaxation, behavioral change, coping style, and other factors important to improvement.  NO     Diagnostic Studies:  Reviewed recent cervical, thoracic, and lumbar MRIs.     Medication Management:   Increase gabapentin - current dose 300 mg three times daily. Increase by 300 mg every 3 days until goal dose of 600 mg three times daily achieved. Monitor for changes in baseline pain levels, intensity of fluctuations, and improvements in sensitivity to light touch. Will consider dosage increase at follow up, pending outcome with initial dosing.   Monitor for sedation - may cause dizziness or drowsiness. Be careful driving/moving around until you know effects of medication. Avoid concurrent alcohol use.   Continue 20 mg twice daily.  She reports small improvement after starting baclofen. Will plan to continue current dose for now.   Flexeril - continue 5-10 mg twice daily as needed. She takes in morning and afternoon, appreciates benefit for significant myofascial tightness.   Allow 4-6 hours in between all muscle relaxant doses, avoid concurrent alcohol use.   Medical cannabis - she inquired about medical cannabis today. Advised her on lack of FDA approval/regulation and out of pocket costs (no insurance coverage). Encouraged her to visit [facility] website to further explore  medical cannabis program. Will consider certification at follow up.      Potential procedures:   She had bilateral SI joint injections with Dr. Reynaga on 5/16/2023.  She reports significant benefit for at least 2-3 months, though pain has since returned, and she is interested in repeat injection. Orders placed today.   She was referred to neurosurgery after recent ED visit, C7-T1 JASE recommended. Discussed today, encouraged her to call to schedule. Pending outcome, may consider alternative injections (TPI, CMBB/RFA) pending physical exam findings.      Other Orders/Referrals:   Recommend using TENS unit a few times throughout the day as needed, particularly when engaging in activities that may increase pain.      Follow up with JEANNIE Schroeder CNP in 6-8 weeks for IN PERSON visit.     Since her last visit, Radha SHILPA Beckwith reports:  -Her pain has not improved since last visit. She notes worsening neck pain and had ED visit on 11/13/23.   -Repeat SIJ injections on 11/2/23 with Dr. Reynaga. She reports injection was not helpful.   -Gabapentin increased at last visit, she reports marginal benefit. Will likely taper off at next visit, pending outcome with duloxetine increase.   -Discussed medical cannabis at last visit. She states she tried OTC cannabis product from smoke shop that had delta 9, did not like the psychoactive effects and no pain benefit. However, she is open to explore [place] program, which we discussed is more controlled and targeting pain.   -She states she is restarting PT with new provider. She has been rescheduled numerous times with different people recently, which has been frustrating.   -she is dealing with some other health concerns right now, states there was finding in her ear on recent imaging.   -She follows with [facility], Dr. Arteaga, recently referred to Dr. Rosario for nonsurgical tx of low back pain. Will defer tx recs for low back to NS at this time.     Pain Information:   Pain rating: averages  "7/10 on a 0-10 scale.      Interval history from last visit on 10/4/23:  -She has been struggling with pain since last visit.  -She had ED visit prior to and following last visit.   -She reports second ED visit on 9/12/23 was a horrible experience due to pain and treatment received.   -She continues to have neck and headache pain.  -She has wrist pain, particularly with typing, and extends into hand.   -She had EMG done since last visit that was negative for CTS.   -She is very frustrated without having answer to why she is having significantly increased pain.   -She is having posterior neck pain with radiation into posterior head.   -She reports having blurred vision, headache pain alternatives sides.   -She states the side of her head \"feels funky,\" somewhat similar to Bell's palsy she had in the past.   -She was started on gabapentin 300 mg TID at last visit, feels like pain has improved slightly, particularly when she is at rest.   -She does notice some daytime sedation since starting gabapentin but otherwise no side effects.   -She is open to increasing gabapentin dosage.   -She is also potentially interested in medical cannabis.   Pain Information:              Pain rating: averages 8/10 on a 0-10 scale.        Interval history from last visit on 9/7/23:  -her pain is worse than it was at last visit.   -She reports neck pain has worsened, also having \"new\" pain in mid back that is constant.   -She does not notice any change in pain levels with bending/twisting.   -She had ED visit yesterday, largely unremarkable workup.   -She reports neck pain has been worsening since July when she saw pain PT.   -She continues to work with pain PT with [facility], last visit 9/5/23.   -She states she was doing some of the PT exercises wrong and was finally corrected at this last pain PT visit.   -She reports she went to chiropractor last week, had massage and adjustment, no change in pain levels.   -She is having pain " "radiating into BUE,   -Cervical CT yesterday demonstrated mild C4-5 discogenic pathology.  -She reports having ringing in ears since June.   -She states she is keeping diary of pain symptoms.   -She is open to trial gabapentin.   -She tried Lyrica in the past that caused weird dreams.   Pain Information:              Pain rating: averages 8/10 on a 0-10 scale.        Interval history from last visit on 7/13/23:  -her pain is about the same as it was at last visit in neck, low back is improved since SI joint to an extent.   -She was referred to pain PT at last visit, has been seeing [facility].   -She states she is \"extremely sensitive,\" so touching is limited during visits.   -She is doing some neck stretching.   -She has not appreciated profound difference from pain PT yet.   -She had SI joint injection with Dr. Reynaga on 5/16/23.  -She reports substantial improvement in pain for about a month, then started to have some pain symptoms return.   -She continued to appreciate some benefit from injection for a while longer, less intensity of pain.   -She notes she had migraines x 2 weeks, which she wonders if is attributed to stress of having her son home.   -She also had teeth removal.   -She is taking baclofen 10 mg BID  -She has not appreciated much improvement in neck and low back myofascial pain.   -She continues to take flexeril as well, notes this is helpful when she is very stiff in morning.   -She is open to baclofen increase.   -She understands she needs to space out muscle relaxants doses by 4 hours.   -She is using TENS unit a little bit, will be taking to pain PT.   Pain Information:              Pain rating: averages 7/10 on a 0-10 scale.           HPI from initial visit with me on 4/26/23:  Radha Beckwith is a 47 year old female presents with a chief complaint of neck pain/upper back and radicular low back pain.      The pain has been present for 1.5-2 years current pain episode.    The pain is " Severe Pain (6) in severity.    The pain is described as dull ache in mid back, cramping, sharp, pressure, throbbing, burning in low back, radicular pain into hips, legs, butt, stops above knee.   The pain is alleviated by position changes, massage/heat device, exercise.    It is exacerbated by prolonged sitting, pain is constant but fluctuates with activity, prolonged standing and walking.    Modalities that have been utilized in the past which were helpful include PT.    Things that were not helpful, but tried ,include back brace, LESI x 1, TPI (?), TENS unit (OTC from [facility]).    The patient has never tried pain PT, SI joint/facet.  The patient otherwise denies bowel or bladder incontinence, parasthesias, weakness, saddle anesthesia, unintentional weight loss, or fever/chills/sweats.   Radha Beckwith has been seen at a pain clinic in the past.  She sees addiction med in [place], [facility] with Dr. Ivory.   -She is not currently working due to pain, off work for 1.5 years.   -She looked into disability but was advised by  that they would not take on her case.   -She had L5-S1 JASE 7/2022 with Dr. Ivory in [place].   -She saw neurosurgery in the past as well in 2021.   -She saw pain provider through [facility], did not care for provider.   -She has 1 kid, 25 year old boy, [employer] and .            Current Pain Treatments:     Medications:               Suboxone 2-0.5 mg daily (weaning off)              Duloxetine 30 mg BID              Ropinirole 1 mg at bedtime               Cyclobenzaprine 5-10 mg BID PRN               Baclofen 20 mg BID     2. Other therapies:               TENS              PT        Current MME: N/A     Review of [place] Prescription Monitoring Program (): No concern for abuse or misuse of controlled medications based on this report. Reviewed - appears appropriate.      Past pain treatments:  SI joint injection 5/16/23 -significant benefit x1 month, then  diminishing efficacy    Medications:  Current Outpatient Medications   Medication Sig Dispense Refill    acetaminophen (TYLENOL) 500 MG tablet Take 1,000 mg by mouth every 6 hours as needed for mild pain      ALPRAZolam (XANAX) 0.5 MG tablet TAKE ONE TABLET BY MOUTH TWICE A DAY 60 tablet 0    baclofen (LIORESAL) 20 MG tablet TAKE 1 TABLET (20 MG) BY MOUTH 2 TIMES DAILY 60 tablet 1    bisacodyl (DULCOLAX) 5 MG EC tablet Take 1 tablet (5 mg) by mouth every other day Take every other day. If no bowel movement for 2 or more days, take 2 tablets of bisacodyl (10 mg) 30 tablet 3    buprenorphine HCl-naloxone HCl (SUBOXONE) 2-0.5 MG per film Place 0.5 Film under the tongue daily       cetirizine (ZYRTEC) 10 MG tablet TAKE ONE TABLET BY MOUTH EVERY MORNING (Patient taking differently: Take 10 mg by mouth daily) 90 tablet 3    cyclobenzaprine (FLEXERIL) 5 MG tablet Take 1-2 tablets (5-10 mg) by mouth 3 times daily as needed for muscle spasms 90 tablet 5    DULoxetine (CYMBALTA) 30 MG capsule Take 60 mg in morning and 30 mg in evening. 270 capsule 0    famotidine (PEPCID) 40 MG tablet TAKE 1 TABLET (40 MG) BY MOUTH 2 TIMES DAILY 60 tablet 1    gabapentin (NEURONTIN) 300 MG capsule Take 2 capsules (600 mg) by mouth 3 times daily CURRENT DOSE IS  1 CAPSULE  THREE TIMES A DAY . INCREASE BY 1 CAPSULE EVERY 3 DAYS UNTIL GOAL DOSE OF  2 CAPSULES BY MOUTH   THREE TIMES A DAY IS  ACHIEVED. 180 capsule 1    lactulose (CHRONULAC) 10 GM/15ML solution Take 15 mLs (10 g) by mouth 3 times daily as needed for constipation (as needed for severe constipaiton, no BM for more than 3 days and feeling constipated) 300 mL 3    ondansetron (ZOFRAN ODT) 4 MG ODT tab DISSOLVE ONE TABLET ON TONGUE EVERY SIX HOURS AS NEEDED FOR NAUSEA 20 tablet 1    pantoprazole (PROTONIX) 40 MG EC tablet Take 1 tablet (40 mg) by mouth 2 times daily (before meals) 60 tablet 5    polyethylene glycol (MIRALAX) 17 GM/Dose powder Take 17 g by mouth daily 578 g 3     rOPINIRole (REQUIP) 1 MG tablet TAKE ONE TABLET BY MOUTH EVERY NIGHT AT BEDTIME 90 tablet 0    simvastatin (ZOCOR) 10 MG tablet Take 1 tablet (10 mg) by mouth at bedtime 90 tablet 0    zolpidem (AMBIEN) 5 MG tablet TAKE ONE TABLET (5 MG) BY MOUTH NIGHTLY AS NEEDED FOR SLEEP 30 tablet 0    traZODone (DESYREL) 50 MG tablet Take 1-2 tablets ( mg) by mouth nightly as needed for sleep 120 tablet 1       Medical History: any changes in medical history since they were last seen? No      Objective:    Physical Exam:  Blood pressure 114/69, pulse 97, not currently breastfeeding.  Constitutional: Well developed, well nourished, appears stated age.  Gait: Intact   HEENT: Head atraumatic, normocephalic. Eyes without conjunctival injection or jaundice. Oropharynx clear. Neck supple. No obvious neck masses.  Skin: No rash, lesions, or petechiae of exposed skin.   Psychiatric/mental status: Alert, without lethargy or stupor. Speech fluent. Appropriate affect. Mood normal. Able to follow commands without difficulty.     Diagnostic Tests/Imaging/Labs:  CMP 9/13/23 - renal and hepatic function intact.     BILLING TIME DOCUMENTATION:   The total TIME spent on this patient on the date of the encounter/appointment was 30 minutes.      TOTAL TIME includes:   Time spent preparing to see the patient (reviewing records and tests)   Time spent face to face (or over the phone) with the patient   Time spent ordering tests, medications, procedures and referrals   Time spent Referring and communicating with other healthcare professionals   Time spent documenting clinical information in Epic

## 2024-01-04 ENCOUNTER — VIRTUAL VISIT (OUTPATIENT)
Dept: PSYCHOLOGY | Facility: CLINIC | Age: 49
End: 2024-01-04
Payer: COMMERCIAL

## 2024-01-04 DIAGNOSIS — F41.1 GENERALIZED ANXIETY DISORDER: ICD-10-CM

## 2024-01-04 DIAGNOSIS — F33.1 MODERATE EPISODE OF RECURRENT MAJOR DEPRESSIVE DISORDER (H): Primary | ICD-10-CM

## 2024-01-04 PROCEDURE — 90837 PSYTX W PT 60 MINUTES: CPT | Mod: 95 | Performed by: COUNSELOR
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
## 2024-01-05 ENCOUNTER — LAB (OUTPATIENT)
Dept: LAB | Facility: CLINIC | Age: 49
End: 2024-01-05
Payer: COMMERCIAL

## 2024-01-05 DIAGNOSIS — G25.81 RESTLESS LEGS SYNDROME (RLS): ICD-10-CM

## 2024-01-05 DIAGNOSIS — F41.9 ANXIETY: ICD-10-CM

## 2024-01-05 DIAGNOSIS — E78.5 HYPERLIPIDEMIA LDL GOAL <100: ICD-10-CM

## 2024-01-05 LAB
ANION GAP SERPL CALCULATED.3IONS-SCNC: 8 MMOL/L (ref 7–15)
BUN SERPL-MCNC: 10.1 MG/DL (ref 6–20)
CALCIUM SERPL-MCNC: 9.1 MG/DL (ref 8.6–10)
CHLORIDE SERPL-SCNC: 99 MMOL/L (ref 98–107)
CHOLEST SERPL-MCNC: 207 MG/DL
CREAT SERPL-MCNC: 0.77 MG/DL (ref 0.51–0.95)
DEPRECATED HCO3 PLAS-SCNC: 32 MMOL/L (ref 22–29)
EGFRCR SERPLBLD CKD-EPI 2021: >90 ML/MIN/1.73M2
FASTING STATUS PATIENT QL REPORTED: YES
FERRITIN SERPL-MCNC: 23 NG/ML (ref 6–175)
FOLATE SERPL-MCNC: 29.3 NG/ML (ref 4.6–34.8)
GLUCOSE SERPL-MCNC: 111 MG/DL (ref 70–99)
HDLC SERPL-MCNC: 39 MG/DL
LDLC SERPL CALC-MCNC: 113 MG/DL
MAGNESIUM SERPL-MCNC: 2 MG/DL (ref 1.7–2.3)
NONHDLC SERPL-MCNC: 168 MG/DL
POTASSIUM SERPL-SCNC: 3.9 MMOL/L (ref 3.4–5.3)
SODIUM SERPL-SCNC: 139 MMOL/L (ref 135–145)
TRIGL SERPL-MCNC: 274 MG/DL
TSH SERPL DL<=0.005 MIU/L-ACNC: 3.75 UIU/ML (ref 0.3–4.2)
VIT B12 SERPL-MCNC: 729 PG/ML (ref 232–1245)
VIT D+METAB SERPL-MCNC: 31 NG/ML (ref 20–50)

## 2024-01-05 PROCEDURE — 36415 COLL VENOUS BLD VENIPUNCTURE: CPT

## 2024-01-05 PROCEDURE — 80061 LIPID PANEL: CPT

## 2024-01-05 PROCEDURE — 82607 VITAMIN B-12: CPT

## 2024-01-05 PROCEDURE — 82306 VITAMIN D 25 HYDROXY: CPT

## 2024-01-05 PROCEDURE — 84443 ASSAY THYROID STIM HORMONE: CPT

## 2024-01-05 PROCEDURE — 82746 ASSAY OF FOLIC ACID SERUM: CPT

## 2024-01-05 PROCEDURE — 80048 BASIC METABOLIC PNL TOTAL CA: CPT

## 2024-01-05 PROCEDURE — 83735 ASSAY OF MAGNESIUM: CPT

## 2024-01-05 PROCEDURE — 82728 ASSAY OF FERRITIN: CPT

## 2024-01-06 DIAGNOSIS — T40.2X5A CONSTIPATION DUE TO OPIOID THERAPY: ICD-10-CM

## 2024-01-06 DIAGNOSIS — K21.00 GASTROESOPHAGEAL REFLUX DISEASE WITH ESOPHAGITIS WITHOUT HEMORRHAGE: ICD-10-CM

## 2024-01-06 DIAGNOSIS — K59.03 CONSTIPATION DUE TO OPIOID THERAPY: ICD-10-CM

## 2024-01-08 DIAGNOSIS — F51.04 PSYCHOPHYSIOLOGICAL INSOMNIA: ICD-10-CM

## 2024-01-08 DIAGNOSIS — E78.2 MIXED HYPERLIPIDEMIA: ICD-10-CM

## 2024-01-08 RX ORDER — SIMVASTATIN 10 MG
10 TABLET ORAL AT BEDTIME
Qty: 90 TABLET | Refills: 0 | Status: SHIPPED | OUTPATIENT
Start: 2024-01-08 | End: 2024-04-22

## 2024-01-08 RX ORDER — BISACODYL 5 MG/1
5 TABLET, DELAYED RELEASE ORAL EVERY OTHER DAY
Qty: 30 TABLET | Refills: 2 | Status: SHIPPED | OUTPATIENT
Start: 2024-01-08 | End: 2024-04-26

## 2024-01-08 RX ORDER — ZOLPIDEM TARTRATE 5 MG/1
TABLET ORAL
Qty: 30 TABLET | Refills: 1 | Status: SHIPPED | OUTPATIENT
Start: 2024-01-08 | End: 2024-03-12

## 2024-01-10 ENCOUNTER — THERAPY VISIT (OUTPATIENT)
Dept: PHYSICAL THERAPY | Facility: CLINIC | Age: 49
End: 2024-01-10
Attending: PREVENTIVE MEDICINE
Payer: COMMERCIAL

## 2024-01-10 DIAGNOSIS — M54.41 CHRONIC LOW BACK PAIN WITH BILATERAL SCIATICA: Primary | ICD-10-CM

## 2024-01-10 DIAGNOSIS — M99.05 SOMATIC DYSFUNCTION OF PELVIC REGION: ICD-10-CM

## 2024-01-10 DIAGNOSIS — G89.29 CHRONIC LOW BACK PAIN WITH BILATERAL SCIATICA: Primary | ICD-10-CM

## 2024-01-10 DIAGNOSIS — M54.42 CHRONIC LOW BACK PAIN WITH BILATERAL SCIATICA: Primary | ICD-10-CM

## 2024-01-10 DIAGNOSIS — M79.18 MYOFASCIAL PAIN: ICD-10-CM

## 2024-01-10 PROCEDURE — 97110 THERAPEUTIC EXERCISES: CPT | Mod: GP | Performed by: PHYSICAL THERAPIST

## 2024-01-10 PROCEDURE — 97140 MANUAL THERAPY 1/> REGIONS: CPT | Mod: GP | Performed by: PHYSICAL THERAPIST

## 2024-01-10 PROCEDURE — 97162 PT EVAL MOD COMPLEX 30 MIN: CPT | Mod: GP | Performed by: PHYSICAL THERAPIST

## 2024-01-10 ASSESSMENT — PATIENT HEALTH QUESTIONNAIRE - PHQ9
SUM OF ALL RESPONSES TO PHQ QUESTIONS 1-9: 22
10. IF YOU CHECKED OFF ANY PROBLEMS, HOW DIFFICULT HAVE THESE PROBLEMS MADE IT FOR YOU TO DO YOUR WORK, TAKE CARE OF THINGS AT HOME, OR GET ALONG WITH OTHER PEOPLE: EXTREMELY DIFFICULT
SUM OF ALL RESPONSES TO PHQ QUESTIONS 1-9: 22

## 2024-01-10 NOTE — PROGRESS NOTES
"PHYSICAL THERAPY EVALUATION  Type of Visit: Evaluation    See electronic medical record for Abuse and Falls Screening details.    Subjective       Presenting condition or subjective complaint: Severe low back pain, mid back pain, neck and shoulder pain and need to increase muscle tone  Date of onset: 10/01/20 (Approximately  and has had chronic pain for years)    Relevant medical history: Bladder or bowel problems; Depression; Dizziness; Hearing problems; History of fractures; Menopause; Migraines or headaches; Overweight; Pain at night or rest; Severe headaches; Significant weakness; Smoking; Vision problems   Dates & types of surgery: See chart    Prior diagnostic imaging/testing results: MRI; CT scan; X-ray; EMG     Prior therapy history for the same diagnosis, illness or injury: Yes Physical therapy  Chief complaint:  B SI pain, neck pain, low back pain. MD HPI: \"47 year old female with neck and back pain, cervical and lumbar spondylosis.  6 months of daily throbbing neck pain radiating to the bilateral shoulders.  Also feels some burning and numbness in her hands.  UE EMG was normal.  Also with throbbing low back pain radiating to the left>right lateral thighs.  Worse with standing and activity.  Past bilateral SI joint injections without improvement.  MR Cervical, personally reviewed, with mild spondylotic change, moderate right C4-5 foraminal stenosis.  MR Lumbar, personally reviewed, with mild degenerative change, mild left L5-S1 foraminal stenosis.\" Previous PT ended 11/2023 and then was referred by Dr Arteaga to see Dr Rosario in clinic 12/12 and felt ok, had increased pain a few days later afterwards. SYMPTOMS WORSENED WITH bending over intermediate, loading and unloading dishes, laundry, hasn't slept in a bed for months due to back; sleeping in a recliner. SYMPTOMS IMPROVED WITH heating pad, TENS unit. Sitting seems to improve her back some vs standing and walking. Patient endorses sees chiropractor but then " "was in ER after her last visit citing that \"messed something up\", reporting from nervous system type upset from the massage machine, tolerates adjustments ok.  Doing MET from 12/12 spine visit with Dr Rosario daily, but states not sure if doing correctly. Prior interventions include injections B SI joints B x 2 (first helped, recent didn't help at all). Previous PT which didn't seem helpful but reports was more focused on the neck.  Patient is not currently employed, has interview with [employer] soon,  did is applying for SSDI.  Pain is 4/10 at best, 10/10 worst, and 8/10 currently; R >L SI pain. Goal for PT: Improve symptoms to tolerate more activities.         Past Medical History:   Diagnosis Date    Abdominal pain, right lower quadrant 03/09/2008    Admit. Discharged 03/10/08    Anxiety attack 07/31/2015    Atypical chest pain 06/23/2015    De Quervain's disease (tenosynovitis)     Dehydration     Depressive disorder 1996    Gastric ulcer 07/31/2015    GERD (gastroesophageal reflux disease) 07/28/2010    Takes omeprazole and metoclopramide     Hypertension 2017    Ingrowing nail 01/09/2014    Migraines     Opioid dependence in remission (H) 08/02/2015    Other and unspecified ovarian cyst     Papanicolaou smear of cervix with low grade squamous intraepithelial lesion (LGSIL) 07/07/2017       Past Surgical History:   Procedure Laterality Date    BIOPSY CERVICAL, LOCAL EXCISION, SINGLE/MULTIPLE N/A 8/10/2017    Procedure: BIOPSY CERVICAL, LOCAL EXCISION, SINGLE/MULTIPLE;;  Surgeon: Michael Chandler MD;  [facility]    COLONOSCOPY N/A 1/6/2023    Procedure: COLONOSCOPY;  Surgeon: Michael Dozier MD;  [facility]    COLPOSCOPY, BIOPSY, COMBINED N/A 8/10/2017    Procedure: COMBINED COLPOSCOPY, BIOPSY;  Colposcopy with Cervical Biopsies and Endometrial Biopsy, Exam with Ultrasound;  Surgeon: Michael Chandler MD;  [facility]    ESOPHAGOSCOPY, GASTROSCOPY, DUODENOSCOPY (EGD), " COMBINED N/A 4/17/2017    Procedure: COMBINED ESOPHAGOSCOPY, GASTROSCOPY, DUODENOSCOPY (EGD);  Surgeon: Ibrahima Esposito MD;  [facility]    ESOPHAGOSCOPY, GASTROSCOPY, DUODENOSCOPY (EGD), COMBINED N/A 1/6/2023    Procedure: ESOPHAGOGASTRODUODENOSCOPY, WITH BIOPSY;  Surgeon: Michael Dozier MD;  [facility]    ESOPHAGOSCOPY, GASTROSCOPY, DUODENOSCOPY (EGD), COMBINED N/A 10/3/2023    Procedure: ESOPHAGOGASTRODUODENOSCOPY, WITH BIOPSY;  Surgeon: John Paul Varela MD;  [facility]    EXAM UNDER ANESTHESIA PELVIC N/A 8/10/2017    Procedure: EXAM UNDER ANESTHESIA PELVIC;;  Surgeon: Michael Chandler MD;  [facility]    HYSTERECTOMY      HYSTERECTOMY, PAP NO LONGER INDICATED      INJECT EPIDURAL CERVICAL N/A 10/20/2023    Procedure: Cervical 6-7 Interlaminar epidural steroid injection using fluoroscopic guidance with contrast dye.;  Surgeon: Trevor Ivory MD;  [facility]    INJECT EPIDURAL LUMBAR Bilateral 7/1/2022    Procedure: Lumbar 5-Sacral 1 Transforaminal Epidural Steroid Injection with fluoroscopic guidance and contrast, bilateral;  Surgeon: Trevor Ivory MD;  [facility]    LAPAROSCOPIC CHOLECYSTECTOMY N/A 2/2/2018    Procedure: LAPAROSCOPIC CHOLECYSTECTOMY;  Laparoscopic Cholecystectomy;  Surgeon: Tigre Lowry DO;  [facility]    LAPAROSCOPIC HYSTERECTOMY TOTAL N/A 10/30/2017    Procedure: LAPAROSCOPIC HYSTERECTOMY TOTAL;  LAPAROSCOPIC HYSTERECTOMY TOTAL POSSIBLE SALPINGO-OOPHERECTOMY (BILATERAL);  Surgeon: Michael Chandler MD;  [facility]    ZZHC UGI ENDOSCOPY, SIMPLE EXAM  01/07/08       Prior Level of Function  Transfers: Independent  Ambulation: Independent  ADL: Independent  IADL:     Living Environment  Social support: With a significant other or spouse   Type of home: House   Stairs to enter the home: Yes 2 Is there a railing: No   Ramp: No   Stairs inside the home: Yes 15 Is there a railing: Yes   Help at home: Home and Yard  maintenance tasks  Equipment owned: Bath bench     Employment: No    Hobbies/Interests: Painting, puzzles, gardening, landscaping, word puzzles    Patient goals for therapy: Work, household chores, every day normal life    Pain assessment: Pain present     Objective   LUMBAR SPINE EVALUATION  PAIN: Pain Level at Rest: 7/10  Pain Level with Use: 10/10  INTEGUMENTARY (edema, incisions): WNL  POSTURE: Sitting Posture: Rounded shoulders, Forward head, Lordosis decreased  GAIT:   Weightbearing Status: WBAT  Assistive Device(s): None  Gait Deviations: Radha decreased  BALANCE/PROPRIOCEPTION:  Needs UEs for >2seconds SL during SI test elements to maintain balance    ROM:   Reports B posterior hip/pelvic pain from spine laterally towards lateral hips,   FF fingertips to distal shin, Return to stand L>R  SI pain noted. Extension moderate limitation and pain at TL junction , R SB mild limitation with painful return to midline. L SB max limited and Rsided lateral trunk and hip pain.   PELVIC/SI SCREEN: Standing Forward Bend: + painful L SIJ, Jared (March): + L, Supine to Sit: Short to even L, Sacroiliac Provocation Test: -, Pubic Symphysis Provocation: -  Generalized tenderness in many areas of palpation in body makes pain provocation difficult to assess validity of  PELVIC ALIGNMENT right posterior innominate rotation.  Right posterior sacral torsion.   STRENGTH:  Funciotnal strength LE, painful R hip flexion in R hip   but able to handle >4/5 resistance. R >L hip abduction weak 4-5 and poor trunk stability demonstrates with it.     MYOTOMES: WNL  DTR S: WNL  CORD SIGNS: WNL  DERMATOMES:  States legs have altered sensation at baseline. Tingling in thighs, no change based on back ROM /exam  NEURAL TENSION: Lumbar WNL  FLEXIBILITY: Decreased hip IR L, Decreased hip ER L, Decreased hip IR R, Decreased hip ER R, Decreased adductors R, Decreased piriformis R, Decreased hip flexors R, Decreased quadriceps R, Decreased hamstrings  R   R hip flexion SKC tighter than L in glute, HS length 50 deg vs 65 on L, ER WFL B, IR mild limited R>L    PELVIS/SI SPECIAL TESTS:    Left Right Additional Notes   ASLR      Gaenslen's Test      Pelvic Compression   No change in pain, stiffness noted   Pelvic Gapping   Mild improvement in pain   Sacral Thrust      Thigh Thrust   + R       FUNCTIONAL TESTS: SLS: < 3 seconds without UE support  PALPATION:   + Tenderness At Location Left Right   Quadratus Lumborum - -   Erector Spinae + +   Piriformis  - +   PSIS + +   ASIS + +   Iliac Crest - +   Glut Medius - +   Greater Trochanter - -   Ischial Tuberosity - -   Hamstrings - +   Hip Flexors + +   Vertebral  + +     SPINAL SEGMENTAL CONCLUSIONS:  NT on evaluation    Bridging causes R hip pain x 15 seconds. SLR R 5-/5 , L 4+/5 with TPR L pelvis   R sided hip abduction 4-/5 and poor trunk control, 4/5 hip abduction and lateral trunk fatigue/pain    Assessment & Plan   CLINICAL IMPRESSIONS  Medical Diagnosis: Somatic dysfunction of pelvis region (M99.05)  - Primary    Chronic bilateral low back pain with bilateral sciatica (M54.42, M54.41, G89.29)    Myofascial pain (M79.18)    Treatment Diagnosis: Somatic dysfunction of pelvis region (M99.05) - Primary Chronic bilateral low back pain with bilateral sciatica (M54.42, M54.41, G89.29) Myofascial pain (M79.18)   Impression/Assessment: Patient is a 48 year old female with LBp, Si pain, generalized myofascial pain complaints.  The following significant findings have been identified: Pain, Decreased ROM/flexibility, Decreased joint mobility, Decreased strength, Impaired gait, Impaired muscle performance, Decreased activity tolerance, and Impaired posture. These impairments interfere with their ability to perform self care tasks, work tasks, recreational activities, household chores, and household mobility as compared to previous level of function. Referred for PJD tx plan as well as generalized conditioning, will do some  pool therapy also and instructed in HEP    Clinical Decision Making (Complexity):  Clinical Presentation: Evolving/Changing  Clinical Presentation Rationale: based on medical and personal factors listed in PT evaluation  Clinical Decision Making (Complexity): High complexity    PLAN OF CARE  Treatment Interventions:  Modalities: E-stim  Interventions: Gait Training, Manual Therapy, Neuromuscular Re-education, Therapeutic Activity, Therapeutic Exercise, Self-Care/Home Management    Long Term Goals     PT Goal 1  Goal Identifier: Exercise tolerance  Goal Description: Patient will improve ex tolerance to 30 minutes in pool to allow for ex progression on land  Target Date: 03/06/24  PT Goal 2  Goal Identifier: PJD  Goal Description: Patient will demo improved PJD position and alignment as well as tolerance to progressive strengthening HEP for home managament in 8 weeks  Target Date: 03/06/24  PT Goal 3  Goal Identifier: Pain  Goal Description: Patient will note 30% reduce back and hips pain for improved standing and walking tolerance in 8 weeks  Target Date: 03/06/24      Frequency of Treatment: 2x/week  Duration of Treatment: 8 weeks    Education Assessment:        Risks and benefits of evaluation/treatment have been explained.   Patient/Family/caregiver agrees with Plan of Care.     Evaluation Time:     PT Eval, Moderate Complexity Minutes (21274): 40       Signing Clinician: Edith Mills, PT      [facility]                                                                                   OUTPATIENT PHYSICAL THERAPY      PLAN OF TREATMENT FOR OUTPATIENT REHABILITATION   Patient's Last Name, First Name, Radha Ruiz YOB: 1975   Provider's Name   [facility]   Medical Record No.  [id number]     Onset Date: 10/01/20 (Approximately  and has had chronic pain for years)  Start of Care Date: 01/10/24     Medical Diagnosis:   Somatic dysfunction of pelvis region (M99.05)  - Primary    Chronic bilateral low back pain with bilateral sciatica (M54.42, M54.41, G89.29)    Myofascial pain (M79.18)      PT Treatment Diagnosis:  Somatic dysfunction of pelvis region (M99.05) - Primary Chronic bilateral low back pain with bilateral sciatica (M54.42, M54.41, G89.29) Myofascial pain (M79.18) Plan of Treatment  Frequency/Duration: 2x/week/ 8 weeks    Certification date from 01/10/24 to 03/06/24         See note for plan of treatment details and functional goals     Edith Mills, PT                         I CERTIFY THE NEED FOR THESE SERVICES FURNISHED UNDER        THIS PLAN OF TREATMENT AND WHILE UNDER MY CARE     (Physician attestation of this document indicates review and certification of the therapy plan).              Referring Provider:  Guille Rosario    Initial Assessment  See Epic Evaluation- Start of Care Date: 01/10/24

## 2024-01-11 ENCOUNTER — MYC REFILL (OUTPATIENT)
Dept: FAMILY MEDICINE | Facility: CLINIC | Age: 49
End: 2024-01-11
Payer: COMMERCIAL

## 2024-01-11 ENCOUNTER — VIRTUAL VISIT (OUTPATIENT)
Dept: PSYCHOLOGY | Facility: CLINIC | Age: 49
End: 2024-01-11
Payer: COMMERCIAL

## 2024-01-11 DIAGNOSIS — F41.9 ANXIETY: ICD-10-CM

## 2024-01-11 DIAGNOSIS — F41.1 GENERALIZED ANXIETY DISORDER: ICD-10-CM

## 2024-01-11 DIAGNOSIS — F33.1 MODERATE EPISODE OF RECURRENT MAJOR DEPRESSIVE DISORDER (H): Primary | ICD-10-CM

## 2024-01-11 PROCEDURE — 90834 PSYTX W PT 45 MINUTES: CPT | Mod: 95 | Performed by: COUNSELOR

## 2024-01-11 NOTE — PROGRESS NOTES
Answers submitted by the patient for this visit:  Patient Health Questionnaire (Submitted on 1/10/2024)  If you checked off any problems, how difficult have these problems made it for you to do your work, take care of things at home, or get along with other people?: Extremely difficult  PHQ9 TOTAL SCORE: 22          [facility]                                     Progress Note    Patient Name: Radha Beckwith  Date: 1/11/24         Service Type: Individual      Session Start Time: 3:03pm Session End Time: 3:50pm     Session Length: 47 mins    Session #: 21    Attendees: Client    Service Modality:  Video Visit:      Provider verified identity through the following two step process.  Patient provided:  Patient is known previously to provider    Telemedicine Visit: The patient's condition can be safely assessed and treated via synchronous audio and visual telemedicine encounter.      Reason for Telemedicine Visit: Patient convenience (e.g. access to timely appointments / distance to available provider)    Originating Site (Patient Location): Patient's home    Distant Site (Provider Location): Provider Remote Setting- Home Office    Consent:  The patient/guardian has verbally consented to: the potential risks and benefits of telemedicine (video visit) versus in person care; bill my insurance or make self-payment for services provided; and responsibility for payment of non-covered services.     Patient would like the video invitation sent by:  My Chart    Mode of Communication:  Video Conference via [employer]    Distant Location (Provider):  On-site    As the provider I attest to compliance with applicable laws and regulations related to telemedicine.    DATA  Interactive Complexity: No  Crisis: No        Progress Since Last Session (Related to Symptoms / Goals / Homework):   Symptoms: No change .    Homework: Completed in session      Episode of Care Goals: Minimal progress - ACTION (Actively working  towards change); Intervened by reinforcing change plan / affirming steps taken     Current / Ongoing Stressors and Concerns:   The client stated the new cat (who is deaf) is taking more time to acclimate.    Started with a new PT today.  Had an interview with [facility] in [facility].        Treatment Objective(s) Addressed in This Session:   Increase interest, engagement, and pleasure in doing things  Feel less tired and more energy during the day        Intervention:   Talked about the changes she's seen in her boyfriend lately. She stated he's been more lethargic, sleeping more, actually not drinking as much either. Encouraged him to see his doctor and provided some psycho education around mental health and nutrition. Talked about the new physical therapist she has and how she might start pool therapy. Discussed the job and how she's feeling about being able to manage one or not.     Assessments completed prior to visit:  The following assessments were completed by patient for this visit:  PHQ9:       11/13/2023     2:27 PM 11/19/2023     9:30 AM 11/26/2023    10:37 PM 12/14/2023    10:47 AM 12/18/2023    11:01 AM 1/4/2024     2:01 PM 1/10/2024     6:40 PM   PHQ-9 SCORE   PHQ-9 Total Score MyChart 24 (Severe depression) 20 (Severe depression) 20 (Severe depression)  19 (Moderately severe depression) 20 (Severe depression) 22 (Severe depression)   PHQ-9 Total Score 24 20 20 20 19 20 22     GAD7:       9/4/2023    10:01 PM 9/19/2023     9:19 AM 10/4/2023    12:32 PM 10/30/2023    12:23 PM 11/19/2023     9:32 AM 12/10/2023     4:50 AM 1/2/2024    10:50 AM   BO-7 SCORE   Total Score 19 (severe anxiety) 18 (severe anxiety) 19 (severe anxiety) 18 (severe anxiety) 16 (severe anxiety) 21 (severe anxiety) 20 (severe anxiety)   Total Score 19 18    18 19 18    18 16 21 20     PROMIS 10-Global Health (all questions and answers displayed):       2/7/2023    12:57 PM 4/17/2023     2:12 PM 6/25/2023     2:45 PM 8/10/2023    12:50 PM  11/10/2023     9:20 PM 11/19/2023     9:34 AM 11/26/2023    10:43 PM   PROMIS 10   In general, would you say your health is:  Poor Poor Fair Fair Poor Fair   In general, would you say your quality of life is:  Poor Fair Poor Fair Poor Poor   In general, how would you rate your physical health?  Poor Poor Poor Fair Poor Poor   In general, how would you rate your mental health, including your mood and your ability to think?  Fair Fair Poor Fair Poor Poor   In general, how would you rate your satisfaction with your social activities and relationships?  Fair Poor Fair Fair Poor Poor   In general, please rate how well you carry out your usual social activities and roles  Fair Poor Poor Fair Poor Fair   To what extent are you able to carry out your everyday physical activities such as walking, climbing stairs, carrying groceries, or moving a chair?  A little Moderately A little Moderately Mostly A little   In the past 7 days, how often have you been bothered by emotional problems such as feeling anxious, depressed, or irritable?  Always Always Always Often Always Always   In the past 7 days, how would you rate your fatigue on average?  Moderate Severe Severe Severe Severe Severe   In the past 7 days, how would you rate your pain on average, where 0 means no pain, and 10 means worst imaginable pain?  8 7 7 7 7 7   In general, would you say your health is:  1 1 2 2 1 2   In general, would you say your quality of life is:  1 2 1 2 1 1   In general, how would you rate your physical health?  1 1 1 2 1 1   In general, how would you rate your mental health, including your mood and your ability to think?  2 2 1 2 1 1   In general, how would you rate your satisfaction with your social activities and relationships?  2 1 2 2 1 1   In general, please rate how well you carry out your usual social activities and roles. (This includes activities at home, at work and in your community, and responsibilities as a parent, child, spouse,  employee, friend, etc.)  2 1 1 2 1 2   To what extent are you able to carry out your everyday physical activities such as walking, climbing stairs, carrying groceries, or moving a chair?  2 3 2 3 4 2   In the past 7 days, how often have you been bothered by emotional problems such as feeling anxious, depressed, or irritable?  5 5 5 4 5 5   In the past 7 days, how would you rate your fatigue on average?  3 4 4 4 4 4   In the past 7 days, how would you rate your pain on average, where 0 means no pain, and 10 means worst imaginable pain?  8 7 7 7 7 7   Global Mental Health Score  6    6 6 5 8 4 4   Global Physical Health Score  8    8 8 7 9 9 7   PROMIS TOTAL - SUBSCORES  14    14 14 12 17 13 11       Information is confidential and restricted. Go to Review Flowsheets to unlock data.    Multiple values from one day are sorted in reverse-chronological order     [place] Suicide Severity Rating Scale (Lifetime/Recent)      10/4/2022    11:00 AM   [place] Suicide Severity Rating (Lifetime/Recent)   Q1 Wished to be Dead (Past Month) no   Q2 Suicidal Thoughts (Past Month) no   Q3 Suicidal Thought Method no   Q4 Suicidal Intent without Specific Plan no   Q5 Suicide Intent with Specific Plan yes   Q6 Suicide Behavior (Lifetime) yes   Within the Past 3 Months? no   Level of Risk per Screen high risk         ASSESSMENT: Current Emotional / Mental Status (status of significant symptoms):   Risk status (Self / Other harm or suicidal ideation)   Patient denies current fears or concerns for personal safety.   Patient denies current or recent suicidal ideation or behaviors.   Patient denies current or recent homicidal ideation or behaviors.   Patient denies current or recent self injurious behavior or ideation.   Patient denies other safety concerns.   Patient reports there has been no change in risk factors since their last session.     Patient reports there has been no change in protective factors since their last session.      Recommended that patient call 911 or go to the local ED should there be a change in any of these risk factors.     Appearance:   Appropriate    Eye Contact:   Good    Psychomotor Behavior: Normal    Attitude:   Cooperative    Orientation:   All   Speech    Rate / Production: Normal/ Responsive Normal     Volume:  Normal    Mood:    Normal   Affect:    Appropriate    Thought Content:  Clear    Thought Form:  Coherent  Logical    Insight:    Good      Medication Review:   No changes to current psychiatric medication(s)     Medication Compliance:   Yes     Changes in Health Issues:   None reported     Chemical Use Review:   Substance Use: Chemical use reviewed, no active concerns identified      Tobacco Use: No change in amount of tobacco use since last session.  Patient declined discussion at this time    Diagnosis:  1. Moderate episode of recurrent major depressive disorder (H)    2. Generalized anxiety disorder          Collateral Reports Completed:   Not Applicable    PLAN: (Patient Tasks / Therapist Tasks / Other)  Continue to work on stress reduction skills. Talk with boyfriend about health.         Ricarda Bhatia, [facility]                                                         ______________________________________________________________________    Individual Treatment Plan    Patient's Name: Radha Beckwith  YOB: 1975    Date of Creation: 6/28/23  Date Treatment Plan Last Reviewed/Revised: 12/7/23    DSM5 Diagnoses: 296.32 (F33.1) Major Depressive Disorder, Recurrent Episode, Moderate _  Psychosocial / Contextual Factors: living with boyfriend, memory issues  PROMIS (reviewed every 90 days):     Referral / Collaboration:  Referral to another professional/service is not indicated at this time..    Anticipated number of session for this episode of care: 9-12 sessions  Anticipation frequency of session:  as needed  Anticipated Duration of each session: 38-52 minutes  Treatment plan will be  reviewed in 90 days or when goals have been changed.       MeasurableTreatment Goal(s) related to diagnosis / functional impairment(s)  Goal 1: Patient will increase communication skills with family members.    Objective #A (Patient Action)    Patient will learn & utilize at least 2 assertive communication skills weekly.  Status: Continued - Date(s): 12/7/23     Intervention(s)  Therapist will teach emotional regulation skills. distress tolerance skills, interpersonal effectiveness skills, emotion regluation skills, mindfulness skills, radical acceptance. Therapist will teach client how to ID body cues for anxiety, anxiety reduction techniques, how to ID triggers for depression and anxiety- decrease reactivity/eliminate, lifestyle changes to reduce depression and anxiety, communication skills, explore cognitive beliefs and help client to develop healthy cognitive patterns and beliefs.    Objective #B  Patient will use thought-stopping strategy daily to reduce intrusive thoughts.  Status: Continued - Date(s): 12/7/23    Intervention(s)  Therapist will teach emotional regulation skills. distress tolerance skills, interpersonal effectiveness skills, emotion regluation skills, mindfulness skills, radical acceptance. Therapist will teach client how to ID body cues for anxiety, anxiety reduction techniques, how to ID triggers for depression and anxiety- decrease reactivity/eliminate, lifestyle changes to reduce depression and anxiety, communication skills, explore cognitive beliefs and help client to develop healthy cognitive patterns and beliefs.      Goal 2: Patient will decrease depression symptoms.      Objective #A (Patient Action)    Status: Continued - Date(s): 12/7/23    Patient will Increase interest, engagement, and pleasure in doing things  Decrease frequency and intensity of feeling down, depressed, hopeless  Improve quantity and quality of night time sleep / decrease daytime naps  Feel less tired and more  energy during the day   Improve diet, appetite, mindful eating, and / or meal planning  Identify negative self-talk and behaviors: challenge core beliefs, myths, and actions  Improve concentration, focus, and mindfulness in daily activities   Feel less fidgety, restless or slow in daily activities / interpersonal interactions.    Intervention(s)  Therapist will teach emotional regulation skills. distress tolerance skills, interpersonal effectiveness skills, emotion regluation skills, mindfulness skills, radical acceptance. Therapist will teach client how to ID body cues for anxiety, anxiety reduction techniques, how to ID triggers for depression and anxiety- decrease reactivity/eliminate, lifestyle changes to reduce depression and anxiety, communication skills, explore cognitive beliefs and help client to develop healthy cognitive patterns and beliefs.    Objective #B  Patient will identify three distraction and diversion activities and use those activities to decrease level of anxiety  .    Status: Continued - Date(s):  12/7/23    Intervention(s)  Therapist will teach emotional regulation skills. distress tolerance skills, interpersonal effectiveness skills, emotion regluation skills, mindfulness skills, radical acceptance. Therapist will teach client how to ID body cues for anxiety, anxiety reduction techniques, how to ID triggers for depression and anxiety- decrease reactivity/eliminate, lifestyle changes to reduce depression and anxiety, communication skills, explore cognitive beliefs and help client to develop healthy cognitive patterns and beliefs.      Patient has reviewed and agreed to the above plan.      Ricarda Bhatia [facility]

## 2024-01-12 RX ORDER — ALPRAZOLAM 0.5 MG
TABLET ORAL
Qty: 60 TABLET | Refills: 0 | OUTPATIENT
Start: 2024-01-12

## 2024-01-12 RX ORDER — ALPRAZOLAM 0.5 MG
TABLET ORAL
Qty: 60 TABLET | Refills: 0 | Status: SHIPPED | OUTPATIENT
Start: 2024-01-12 | End: 2024-02-13

## 2024-01-17 ENCOUNTER — THERAPY VISIT (OUTPATIENT)
Dept: PHYSICAL THERAPY | Facility: CLINIC | Age: 49
End: 2024-01-17
Attending: PREVENTIVE MEDICINE
Payer: COMMERCIAL

## 2024-01-17 DIAGNOSIS — G89.29 CHRONIC LOW BACK PAIN WITH BILATERAL SCIATICA, UNSPECIFIED BACK PAIN LATERALITY: Primary | ICD-10-CM

## 2024-01-17 DIAGNOSIS — M54.42 CHRONIC LOW BACK PAIN WITH BILATERAL SCIATICA, UNSPECIFIED BACK PAIN LATERALITY: Primary | ICD-10-CM

## 2024-01-17 DIAGNOSIS — M99.05 SOMATIC DYSFUNCTION OF PELVIC REGION: ICD-10-CM

## 2024-01-17 DIAGNOSIS — M79.18 MYOFASCIAL PAIN: ICD-10-CM

## 2024-01-17 DIAGNOSIS — M54.41 CHRONIC LOW BACK PAIN WITH BILATERAL SCIATICA, UNSPECIFIED BACK PAIN LATERALITY: Primary | ICD-10-CM

## 2024-01-17 PROCEDURE — 97110 THERAPEUTIC EXERCISES: CPT | Mod: GP | Performed by: PHYSICAL THERAPIST

## 2024-01-17 PROCEDURE — 97530 THERAPEUTIC ACTIVITIES: CPT | Mod: GP | Performed by: PHYSICAL THERAPIST

## 2024-01-17 PROCEDURE — 97140 MANUAL THERAPY 1/> REGIONS: CPT | Mod: GP | Performed by: PHYSICAL THERAPIST

## 2024-01-18 ENCOUNTER — VIRTUAL VISIT (OUTPATIENT)
Dept: PSYCHOLOGY | Facility: CLINIC | Age: 49
End: 2024-01-18
Payer: COMMERCIAL

## 2024-01-18 DIAGNOSIS — F41.1 GENERALIZED ANXIETY DISORDER: ICD-10-CM

## 2024-01-18 DIAGNOSIS — F33.1 MODERATE EPISODE OF RECURRENT MAJOR DEPRESSIVE DISORDER (H): Primary | ICD-10-CM

## 2024-01-18 PROCEDURE — 90837 PSYTX W PT 60 MINUTES: CPT | Mod: 95 | Performed by: COUNSELOR

## 2024-01-18 ASSESSMENT — ANXIETY QUESTIONNAIRES
3. WORRYING TOO MUCH ABOUT DIFFERENT THINGS: NEARLY EVERY DAY
GAD7 TOTAL SCORE: 20
6. BECOMING EASILY ANNOYED OR IRRITABLE: NEARLY EVERY DAY
6. BECOMING EASILY ANNOYED OR IRRITABLE: NEARLY EVERY DAY
7. FEELING AFRAID AS IF SOMETHING AWFUL MIGHT HAPPEN: NEARLY EVERY DAY
4. TROUBLE RELAXING: MORE THAN HALF THE DAYS
1. FEELING NERVOUS, ANXIOUS, OR ON EDGE: NEARLY EVERY DAY
GAD7 TOTAL SCORE: 20
GAD7 TOTAL SCORE: 20
8. IF YOU CHECKED OFF ANY PROBLEMS, HOW DIFFICULT HAVE THESE MADE IT FOR YOU TO DO YOUR WORK, TAKE CARE OF THINGS AT HOME, OR GET ALONG WITH OTHER PEOPLE?: EXTREMELY DIFFICULT
2. NOT BEING ABLE TO STOP OR CONTROL WORRYING: NEARLY EVERY DAY
IF YOU CHECKED OFF ANY PROBLEMS ON THIS QUESTIONNAIRE, HOW DIFFICULT HAVE THESE PROBLEMS MADE IT FOR YOU TO DO YOUR WORK, TAKE CARE OF THINGS AT HOME, OR GET ALONG WITH OTHER PEOPLE: EXTREMELY DIFFICULT
GAD7 TOTAL SCORE: 20
5. BEING SO RESTLESS THAT IT IS HARD TO SIT STILL: NEARLY EVERY DAY
IF YOU CHECKED OFF ANY PROBLEMS ON THIS QUESTIONNAIRE, HOW DIFFICULT HAVE THESE PROBLEMS MADE IT FOR YOU TO DO YOUR WORK, TAKE CARE OF THINGS AT HOME, OR GET ALONG WITH OTHER PEOPLE: EXTREMELY DIFFICULT
5. BEING SO RESTLESS THAT IT IS HARD TO SIT STILL: NEARLY EVERY DAY
7. FEELING AFRAID AS IF SOMETHING AWFUL MIGHT HAPPEN: NEARLY EVERY DAY
GAD7 TOTAL SCORE: 20
3. WORRYING TOO MUCH ABOUT DIFFERENT THINGS: NEARLY EVERY DAY
8. IF YOU CHECKED OFF ANY PROBLEMS, HOW DIFFICULT HAVE THESE MADE IT FOR YOU TO DO YOUR WORK, TAKE CARE OF THINGS AT HOME, OR GET ALONG WITH OTHER PEOPLE?: EXTREMELY DIFFICULT
7. FEELING AFRAID AS IF SOMETHING AWFUL MIGHT HAPPEN: NEARLY EVERY DAY
4. TROUBLE RELAXING: MORE THAN HALF THE DAYS
GAD7 TOTAL SCORE: 20
2. NOT BEING ABLE TO STOP OR CONTROL WORRYING: NEARLY EVERY DAY
7. FEELING AFRAID AS IF SOMETHING AWFUL MIGHT HAPPEN: NEARLY EVERY DAY
1. FEELING NERVOUS, ANXIOUS, OR ON EDGE: NEARLY EVERY DAY

## 2024-01-18 ASSESSMENT — PATIENT HEALTH QUESTIONNAIRE - PHQ9
SUM OF ALL RESPONSES TO PHQ QUESTIONS 1-9: 23
SUM OF ALL RESPONSES TO PHQ QUESTIONS 1-9: 23
10. IF YOU CHECKED OFF ANY PROBLEMS, HOW DIFFICULT HAVE THESE PROBLEMS MADE IT FOR YOU TO DO YOUR WORK, TAKE CARE OF THINGS AT HOME, OR GET ALONG WITH OTHER PEOPLE: EXTREMELY DIFFICULT

## 2024-01-18 NOTE — PROGRESS NOTES
Answers submitted by the patient for this visit:  Patient Health Questionnaire (Submitted on 1/18/2024)  If you checked off any problems, how difficult have these problems made it for you to do your work, take care of things at home, or get along with other people?: Extremely difficult  PHQ9 TOTAL SCORE: 23  BO-7 (Submitted on 1/18/2024)  BO 7 TOTAL SCORE: 20          [facility]                                     Progress Note    Patient Name: Radha Beckwith  Date: 1/18/24         Service Type: Individual      Session Start Time: 3:00pm Session End Time: 4:00m     Session Length: 60mins    Session #: 22    Attendees: Client    Service Modality:  Video Visit:      Provider verified identity through the following two step process.  Patient provided:  Patient is known previously to provider    Telemedicine Visit: The patient's condition can be safely assessed and treated via synchronous audio and visual telemedicine encounter.      Reason for Telemedicine Visit: Patient convenience (e.g. access to timely appointments / distance to available provider)    Originating Site (Patient Location): Patient's home    Distant Site (Provider Location): Provider Remote Setting- Home Office    Consent:  The patient/guardian has verbally consented to: the potential risks and benefits of telemedicine (video visit) versus in person care; bill my insurance or make self-payment for services provided; and responsibility for payment of non-covered services.     Patient would like the video invitation sent by:  My Chart    Mode of Communication:  Video Conference via [facility]    Distant Location (Provider):  On-site    As the provider I attest to compliance with applicable laws and regulations related to telemedicine.    DATA  Interactive Complexity: No  Crisis: No        Progress Since Last Session (Related to Symptoms / Goals / Homework):   Symptoms: No change .    Homework: Completed in session      Episode of  Care Goals: Minimal progress - ACTION (Actively working towards change); Intervened by reinforcing change plan / affirming steps taken     Current / Ongoing Stressors and Concerns:   The client stated she started with her new PT today and likes her.   Feeling like she's in a funk lately and just doesn't want to do anything.        Treatment Objective(s) Addressed in This Session:   Increase interest, engagement, and pleasure in doing things  Feel less tired and more energy during the day        Intervention:   Talked about the recent PT visit and how she's feeling about this new therapist. Talked to her mom's boyfriend from when she was alive. Hadn't talked to him in years. They talked about her mom and also talked about medical marijuana as she is considering trying.  Might have to get rid of the new cat she got because it's upsetting the dynamic with the other cats in the house. Realizing that she doesn't have the same level of attachment with her pets anymore and isn't sure why. Hasn't heard back about the job, is thinking she didn't get it. Has been applying to other places. Also really looking into social security/disability.  She stated she doesn't have any money right now to be paying for her medications or bills and is really worried about this. She is going to talk to her boyfriend tonight about finances. Talked about communication skills that can help make this a little easier.      Assessments completed prior to visit:  The following assessments were completed by patient for this visit:  PHQ9:       11/19/2023     9:30 AM 11/26/2023    10:37 PM 12/14/2023    10:47 AM 12/18/2023    11:01 AM 1/4/2024     2:01 PM 1/10/2024     6:40 PM 1/18/2024     2:56 PM   PHQ-9 SCORE   PHQ-9 Total Score MyChart 20 (Severe depression) 20 (Severe depression)  19 (Moderately severe depression) 20 (Severe depression) 22 (Severe depression) 23 (Severe depression)   PHQ-9 Total Score 20 20 20 19 20 22 23     GAD7:        9/19/2023     9:19 AM 10/4/2023    12:32 PM 10/30/2023    12:23 PM 11/19/2023     9:32 AM 12/10/2023     4:50 AM 1/2/2024    10:50 AM 1/18/2024     2:56 PM   BO-7 SCORE   Total Score 18 (severe anxiety) 19 (severe anxiety) 18 (severe anxiety) 16 (severe anxiety) 21 (severe anxiety) 20 (severe anxiety) 20 (severe anxiety)   Total Score 18    18 19 18    18 16 21 20 20    20     PROMIS 10-Global Health (all questions and answers displayed):       2/7/2023    12:57 PM 4/17/2023     2:12 PM 6/25/2023     2:45 PM 8/10/2023    12:50 PM 11/10/2023     9:20 PM 11/19/2023     9:34 AM 11/26/2023    10:43 PM   PROMIS 10   In general, would you say your health is:  Poor Poor Fair Fair Poor Fair   In general, would you say your quality of life is:  Poor Fair Poor Fair Poor Poor   In general, how would you rate your physical health?  Poor Poor Poor Fair Poor Poor   In general, how would you rate your mental health, including your mood and your ability to think?  Fair Fair Poor Fair Poor Poor   In general, how would you rate your satisfaction with your social activities and relationships?  Fair Poor Fair Fair Poor Poor   In general, please rate how well you carry out your usual social activities and roles  Fair Poor Poor Fair Poor Fair   To what extent are you able to carry out your everyday physical activities such as walking, climbing stairs, carrying groceries, or moving a chair?  A little Moderately A little Moderately Mostly A little   In the past 7 days, how often have you been bothered by emotional problems such as feeling anxious, depressed, or irritable?  Always Always Always Often Always Always   In the past 7 days, how would you rate your fatigue on average?  Moderate Severe Severe Severe Severe Severe   In the past 7 days, how would you rate your pain on average, where 0 means no pain, and 10 means worst imaginable pain?  8 7 7 7 7 7   In general, would you say your health is:  1 1 2 2 1 2   In general, would you say  your quality of life is:  1 2 1 2 1 1   In general, how would you rate your physical health?  1 1 1 2 1 1   In general, how would you rate your mental health, including your mood and your ability to think?  2 2 1 2 1 1   In general, how would you rate your satisfaction with your social activities and relationships?  2 1 2 2 1 1   In general, please rate how well you carry out your usual social activities and roles. (This includes activities at home, at work and in your community, and responsibilities as a parent, child, spouse, employee, friend, etc.)  2 1 1 2 1 2   To what extent are you able to carry out your everyday physical activities such as walking, climbing stairs, carrying groceries, or moving a chair?  2 3 2 3 4 2   In the past 7 days, how often have you been bothered by emotional problems such as feeling anxious, depressed, or irritable?  5 5 5 4 5 5   In the past 7 days, how would you rate your fatigue on average?  3 4 4 4 4 4   In the past 7 days, how would you rate your pain on average, where 0 means no pain, and 10 means worst imaginable pain?  8 7 7 7 7 7   Global Mental Health Score  6    6 6 5 8 4 4   Global Physical Health Score  8    8 8 7 9 9 7   PROMIS TOTAL - SUBSCORES  14    14 14 12 17 13 11       Information is confidential and restricted. Go to Review Flowsheets to unlock data.    Multiple values from one day are sorted in reverse-chronological order     [place] Suicide Severity Rating Scale (Lifetime/Recent)      10/4/2022    11:00 AM   [place] Suicide Severity Rating (Lifetime/Recent)   Q1 Wished to be Dead (Past Month) no   Q2 Suicidal Thoughts (Past Month) no   Q3 Suicidal Thought Method no   Q4 Suicidal Intent without Specific Plan no   Q5 Suicide Intent with Specific Plan yes   Q6 Suicide Behavior (Lifetime) yes   Within the Past 3 Months? no   Level of Risk per Screen high risk         ASSESSMENT: Current Emotional / Mental Status (status of significant symptoms):   Risk status  (Self / Other harm or suicidal ideation)   Patient denies current fears or concerns for personal safety.   Patient denies current or recent suicidal ideation or behaviors.   Patient denies current or recent homicidal ideation or behaviors.   Patient denies current or recent self injurious behavior or ideation.   Patient denies other safety concerns.   Patient reports there has been no change in risk factors since their last session.     Patient reports there has been no change in protective factors since their last session.     Recommended that patient call 911 or go to the local ED should there be a change in any of these risk factors.     Appearance:   Appropriate    Eye Contact:   Good    Psychomotor Behavior: Normal    Attitude:   Cooperative    Orientation:   All   Speech    Rate / Production: Normal/ Responsive Normal     Volume:  Normal    Mood:    Irritable  Sad    Affect:    Appropriate    Thought Content:  Clear    Thought Form:  Coherent  Logical    Insight:    Good      Medication Review:   No changes to current psychiatric medication(s)     Medication Compliance:   Yes     Changes in Health Issues:   None reported     Chemical Use Review:   Substance Use: Chemical use reviewed, no active concerns identified      Tobacco Use: No change in amount of tobacco use since last session.  Patient declined discussion at this time    Diagnosis:  1. Moderate episode of recurrent major depressive disorder (H)    2. Generalized anxiety disorder          Collateral Reports Completed:   Not Applicable    PLAN: (Patient Tasks / Therapist Tasks / Other)  Continue to work on stress reduction skills. Talk with boyfriend about needs.         Ricarda Bhatia, [facility]                                                         ______________________________________________________________________    Individual Treatment Plan    Patient's Name: Radha Beckwith  YOB: 1975    Date of Creation: 6/28/23  Date  Treatment Plan Last Reviewed/Revised: 12/7/23    DSM5 Diagnoses: 296.32 (F33.1) Major Depressive Disorder, Recurrent Episode, Moderate _  Psychosocial / Contextual Factors: living with boyfriend, memory issues  PROMIS (reviewed every 90 days):     Referral / Collaboration:  Referral to another professional/service is not indicated at this time..    Anticipated number of session for this episode of care: 9-12 sessions  Anticipation frequency of session:  as needed  Anticipated Duration of each session: 38-52 minutes  Treatment plan will be reviewed in 90 days or when goals have been changed.       MeasurableTreatment Goal(s) related to diagnosis / functional impairment(s)  Goal 1: Patient will increase communication skills with family members.    Objective #A (Patient Action)    Patient will learn & utilize at least 2 assertive communication skills weekly.  Status: Continued - Date(s): 12/7/23     Intervention(s)  Therapist will teach emotional regulation skills. distress tolerance skills, interpersonal effectiveness skills, emotion regluation skills, mindfulness skills, radical acceptance. Therapist will teach client how to ID body cues for anxiety, anxiety reduction techniques, how to ID triggers for depression and anxiety- decrease reactivity/eliminate, lifestyle changes to reduce depression and anxiety, communication skills, explore cognitive beliefs and help client to develop healthy cognitive patterns and beliefs.    Objective #B  Patient will use thought-stopping strategy daily to reduce intrusive thoughts.  Status: Continued - Date(s): 12/7/23    Intervention(s)  Therapist will teach emotional regulation skills. distress tolerance skills, interpersonal effectiveness skills, emotion regluation skills, mindfulness skills, radical acceptance. Therapist will teach client how to ID body cues for anxiety, anxiety reduction techniques, how to ID triggers for depression and anxiety- decrease reactivity/eliminate,  lifestyle changes to reduce depression and anxiety, communication skills, explore cognitive beliefs and help client to develop healthy cognitive patterns and beliefs.      Goal 2: Patient will decrease depression symptoms.      Objective #A (Patient Action)    Status: Continued - Date(s): 12/7/23    Patient will Increase interest, engagement, and pleasure in doing things  Decrease frequency and intensity of feeling down, depressed, hopeless  Improve quantity and quality of night time sleep / decrease daytime naps  Feel less tired and more energy during the day   Improve diet, appetite, mindful eating, and / or meal planning  Identify negative self-talk and behaviors: challenge core beliefs, myths, and actions  Improve concentration, focus, and mindfulness in daily activities   Feel less fidgety, restless or slow in daily activities / interpersonal interactions.    Intervention(s)  Therapist will teach emotional regulation skills. distress tolerance skills, interpersonal effectiveness skills, emotion regluation skills, mindfulness skills, radical acceptance. Therapist will teach client how to ID body cues for anxiety, anxiety reduction techniques, how to ID triggers for depression and anxiety- decrease reactivity/eliminate, lifestyle changes to reduce depression and anxiety, communication skills, explore cognitive beliefs and help client to develop healthy cognitive patterns and beliefs.    Objective #B  Patient will identify three distraction and diversion activities and use those activities to decrease level of anxiety  .    Status: Continued - Date(s):  12/7/23    Intervention(s)  Therapist will teach emotional regulation skills. distress tolerance skills, interpersonal effectiveness skills, emotion regluation skills, mindfulness skills, radical acceptance. Therapist will teach client how to ID body cues for anxiety, anxiety reduction techniques, how to ID triggers for depression and anxiety- decrease  reactivity/eliminate, lifestyle changes to reduce depression and anxiety, communication skills, explore cognitive beliefs and help client to develop healthy cognitive patterns and beliefs.      Patient has reviewed and agreed to the above plan.      Ricarda Bhatia [facility]

## 2024-01-25 ENCOUNTER — VIRTUAL VISIT (OUTPATIENT)
Dept: PSYCHOLOGY | Facility: CLINIC | Age: 49
End: 2024-01-25
Payer: COMMERCIAL

## 2024-01-25 DIAGNOSIS — F33.1 MODERATE EPISODE OF RECURRENT MAJOR DEPRESSIVE DISORDER (H): Primary | ICD-10-CM

## 2024-01-25 DIAGNOSIS — K59.03 CONSTIPATION DUE TO OPIOID THERAPY: ICD-10-CM

## 2024-01-25 DIAGNOSIS — T40.2X5A CONSTIPATION DUE TO OPIOID THERAPY: ICD-10-CM

## 2024-01-25 DIAGNOSIS — K21.00 GASTROESOPHAGEAL REFLUX DISEASE WITH ESOPHAGITIS WITHOUT HEMORRHAGE: ICD-10-CM

## 2024-01-25 DIAGNOSIS — F41.1 GENERALIZED ANXIETY DISORDER: ICD-10-CM

## 2024-01-25 PROCEDURE — 90834 PSYTX W PT 45 MINUTES: CPT | Mod: 95 | Performed by: COUNSELOR

## 2024-01-25 NOTE — PROGRESS NOTES
Answers submitted by the patient for this visit:  BO-7 (Submitted on 1/18/2024)  BO 7 TOTAL SCORE: 20        [facility]                                     Progress Note    Patient Name: Radha Beckwith  Date: 1/25/24         Service Type: Individual      Session Start Time: 2:00pm Session End Time: 3:00pm     Session Length: 60mins    Session #: 23    Attendees: Client    Service Modality:  Video Visit:      Provider verified identity through the following two step process.  Patient provided:  Patient is known previously to provider    Telemedicine Visit: The patient's condition can be safely assessed and treated via synchronous audio and visual telemedicine encounter.      Reason for Telemedicine Visit: Patient convenience (e.g. access to timely appointments / distance to available provider)    Originating Site (Patient Location): Patient's home    Distant Site (Provider Location): Provider [facility] Setting- Home Office    Consent:  The patient/guardian has verbally consented to: the potential risks and benefits of telemedicine (video visit) versus in person care; bill my insurance or make self-payment for services provided; and responsibility for payment of non-covered services.     Patient would like the video invitation sent by:  My Chart    Mode of Communication:  Video Conference via [employer]    Distant Location (Provider):  On-site    As the provider I attest to compliance with applicable laws and regulations related to telemedicine.    DATA  Interactive Complexity: No  Crisis: No        Progress Since Last Session (Related to Symptoms / Goals / Homework):   Symptoms: No change .    Homework: Completed in session      Episode of Care Goals: Minimal progress - ACTION (Actively working towards change); Intervened by reinforcing change plan / affirming steps taken     Current / Ongoing Stressors and Concerns:   The client stated she still hasn't gotten to talk with her boyfriend.   She got  "her medical marijuana. Is worried it won't work for her because of how it's made her feel so far. Felt like she had a hangover from it the other day.   Starts pool PT next week.        Treatment Objective(s) Addressed in This Session:   Increase interest, engagement, and pleasure in doing things  Feel less tired and more energy during the day        Intervention:   Talked about her medical marijuana and encouraged her to continue talking with the pharmacist about how to be effective with it so she isn't experiencing side effects. Talked about what's gotten in the way of talking to her boyfriend. Talked about what she is seeing with his health currently and mental health which is partially getting in the way of her talking with him because of \"not wanting to bring him down more.\" Worked on effective communication skills and how to reframe her own negative thoughts.     Assessments completed prior to visit:  The following assessments were completed by patient for this visit:  PHQ9:       11/19/2023     9:30 AM 11/26/2023    10:37 PM 12/14/2023    10:47 AM 12/18/2023    11:01 AM 1/4/2024     2:01 PM 1/10/2024     6:40 PM 1/18/2024     2:56 PM   PHQ-9 SCORE   PHQ-9 Total Score MyChart 20 (Severe depression) 20 (Severe depression)  19 (Moderately severe depression) 20 (Severe depression) 22 (Severe depression) 23 (Severe depression)   PHQ-9 Total Score 20 20 20 19 20 22 23     GAD7:       9/19/2023     9:19 AM 10/4/2023    12:32 PM 10/30/2023    12:23 PM 11/19/2023     9:32 AM 12/10/2023     4:50 AM 1/2/2024    10:50 AM 1/18/2024     2:56 PM   BO-7 SCORE   Total Score 18 (severe anxiety) 19 (severe anxiety) 18 (severe anxiety) 16 (severe anxiety) 21 (severe anxiety) 20 (severe anxiety) 20 (severe anxiety)   Total Score 18    18 19 18    18 16 21 20 20    20     PROMIS 10-Global Health (all questions and answers displayed):       2/7/2023    12:57 PM 4/17/2023     2:12 PM 6/25/2023     2:45 PM 8/10/2023    12:50 PM " 11/10/2023     9:20 PM 11/19/2023     9:34 AM 11/26/2023    10:43 PM   PROMIS 10   In general, would you say your health is:  Poor Poor Fair Fair Poor Fair   In general, would you say your quality of life is:  Poor Fair Poor Fair Poor Poor   In general, how would you rate your physical health?  Poor Poor Poor Fair Poor Poor   In general, how would you rate your mental health, including your mood and your ability to think?  Fair Fair Poor Fair Poor Poor   In general, how would you rate your satisfaction with your social activities and relationships?  Fair Poor Fair Fair Poor Poor   In general, please rate how well you carry out your usual social activities and roles  Fair Poor Poor Fair Poor Fair   To what extent are you able to carry out your everyday physical activities such as walking, climbing stairs, carrying groceries, or moving a chair?  A little Moderately A little Moderately Mostly A little   In the past 7 days, how often have you been bothered by emotional problems such as feeling anxious, depressed, or irritable?  Always Always Always Often Always Always   In the past 7 days, how would you rate your fatigue on average?  Moderate Severe Severe Severe Severe Severe   In the past 7 days, how would you rate your pain on average, where 0 means no pain, and 10 means worst imaginable pain?  8 7 7 7 7 7   In general, would you say your health is:  1 1 2 2 1 2   In general, would you say your quality of life is:  1 2 1 2 1 1   In general, how would you rate your physical health?  1 1 1 2 1 1   In general, how would you rate your mental health, including your mood and your ability to think?  2 2 1 2 1 1   In general, how would you rate your satisfaction with your social activities and relationships?  2 1 2 2 1 1   In general, please rate how well you carry out your usual social activities and roles. (This includes activities at home, at work and in your community, and responsibilities as a parent, child, spouse,  employee, friend, etc.)  2 1 1 2 1 2   To what extent are you able to carry out your everyday physical activities such as walking, climbing stairs, carrying groceries, or moving a chair?  2 3 2 3 4 2   In the past 7 days, how often have you been bothered by emotional problems such as feeling anxious, depressed, or irritable?  5 5 5 4 5 5   In the past 7 days, how would you rate your fatigue on average?  3 4 4 4 4 4   In the past 7 days, how would you rate your pain on average, where 0 means no pain, and 10 means worst imaginable pain?  8 7 7 7 7 7   Global Mental Health Score  6    6 6 5 8 4 4   Global Physical Health Score  8    8 8 7 9 9 7   PROMIS TOTAL - SUBSCORES  14    14 14 12 17 13 11       Information is confidential and restricted. Go to Review Flowsheets to unlock data.    Multiple values from one day are sorted in reverse-chronological order     Bland Suicide Severity Rating Scale (Lifetime/Recent)      10/4/2022    11:00 AM   Bland Suicide Severity Rating (Lifetime/Recent)   Q1 Wished to be Dead (Past Month) no   Q2 Suicidal Thoughts (Past Month) no   Q3 Suicidal Thought Method no   Q4 Suicidal Intent without Specific Plan no   Q5 Suicide Intent with Specific Plan yes   Q6 Suicide Behavior (Lifetime) yes   Within the Past 3 Months? no   Level of Risk per Screen high risk         ASSESSMENT: Current Emotional / Mental Status (status of significant symptoms):   Risk status (Self / Other harm or suicidal ideation)   Patient denies current fears or concerns for personal safety.   Patient denies current or recent suicidal ideation or behaviors.   Patient denies current or recent homicidal ideation or behaviors.   Patient denies current or recent self injurious behavior or ideation.   Patient denies other safety concerns.   Patient reports there has been no change in risk factors since their last session.     Patient reports there has been no change in protective factors since their last session.      Recommended that patient call 911 or go to the local ED should there be a change in any of these risk factors.     Appearance:   Appropriate    Eye Contact:   Good    Psychomotor Behavior: Normal    Attitude:   Cooperative    Orientation:   All   Speech    Rate / Production: Normal/ Responsive Normal     Volume:  Normal    Mood:    Irritable  Sad    Affect:    Appropriate    Thought Content:  Clear    Thought Form:  Coherent  Logical    Insight:    Good      Medication Review:   No changes to current psychiatric medication(s)     Medication Compliance:   Yes     Changes in Health Issues:   None reported     Chemical Use Review:   Substance Use: Chemical use reviewed, no active concerns identified      Tobacco Use: No change in amount of tobacco use since last session.  Patient declined discussion at this time    Diagnosis:  1. Moderate episode of recurrent major depressive disorder (H)    2. Generalized anxiety disorder          Collateral Reports Completed:   Not Applicable    PLAN: (Patient Tasks / Therapist Tasks / Other)  Continue to work on stress reduction skills. Talk with boyfriend about needs.         Ricarda Bhatia, [facility]                                                         ______________________________________________________________________    Individual Treatment Plan    Patient's Name: Radha Beckwith  YOB: 1975    Date of Creation: 6/28/23  Date Treatment Plan Last Reviewed/Revised: 12/7/23    DSM5 Diagnoses: 296.32 (F33.1) Major Depressive Disorder, Recurrent Episode, Moderate _  Psychosocial / Contextual Factors: living with boyfriend, memory issues  PROMIS (reviewed every 90 days):     Referral / Collaboration:  Referral to another professional/service is not indicated at this time..    Anticipated number of session for this episode of care: 9-12 sessions  Anticipation frequency of session:  as needed  Anticipated Duration of each session: 38-52 minutes  Treatment plan  will be reviewed in 90 days or when goals have been changed.       MeasurableTreatment Goal(s) related to diagnosis / functional impairment(s)  Goal 1: Patient will increase communication skills with family members.    Objective #A (Patient Action)    Patient will learn & utilize at least 2 assertive communication skills weekly.  Status: Continued - Date(s): 12/7/23     Intervention(s)  Therapist will teach emotional regulation skills. distress tolerance skills, interpersonal effectiveness skills, emotion regluation skills, mindfulness skills, radical acceptance. Therapist will teach client how to ID body cues for anxiety, anxiety reduction techniques, how to ID triggers for depression and anxiety- decrease reactivity/eliminate, lifestyle changes to reduce depression and anxiety, communication skills, explore cognitive beliefs and help client to develop healthy cognitive patterns and beliefs.    Objective #B  Patient will use thought-stopping strategy daily to reduce intrusive thoughts.  Status: Continued - Date(s): 12/7/23    Intervention(s)  Therapist will teach emotional regulation skills. distress tolerance skills, interpersonal effectiveness skills, emotion regluation skills, mindfulness skills, radical acceptance. Therapist will teach client how to ID body cues for anxiety, anxiety reduction techniques, how to ID triggers for depression and anxiety- decrease reactivity/eliminate, lifestyle changes to reduce depression and anxiety, communication skills, explore cognitive beliefs and help client to develop healthy cognitive patterns and beliefs.      Goal 2: Patient will decrease depression symptoms.      Objective #A (Patient Action)    Status: Continued - Date(s): 12/7/23    Patient will Increase interest, engagement, and pleasure in doing things  Decrease frequency and intensity of feeling down, depressed, hopeless  Improve quantity and quality of night time sleep / decrease daytime naps  Feel less tired and  more energy during the day   Improve diet, appetite, mindful eating, and / or meal planning  Identify negative self-talk and behaviors: challenge core beliefs, myths, and actions  Improve concentration, focus, and mindfulness in daily activities   Feel less fidgety, restless or slow in daily activities / interpersonal interactions.    Intervention(s)  Therapist will teach emotional regulation skills. distress tolerance skills, interpersonal effectiveness skills, emotion regluation skills, mindfulness skills, radical acceptance. Therapist will teach client how to ID body cues for anxiety, anxiety reduction techniques, how to ID triggers for depression and anxiety- decrease reactivity/eliminate, lifestyle changes to reduce depression and anxiety, communication skills, explore cognitive beliefs and help client to develop healthy cognitive patterns and beliefs.    Objective #B  Patient will identify three distraction and diversion activities and use those activities to decrease level of anxiety  .    Status: Continued - Date(s):  12/7/23    Intervention(s)  Therapist will teach emotional regulation skills. distress tolerance skills, interpersonal effectiveness skills, emotion regluation skills, mindfulness skills, radical acceptance. Therapist will teach client how to ID body cues for anxiety, anxiety reduction techniques, how to ID triggers for depression and anxiety- decrease reactivity/eliminate, lifestyle changes to reduce depression and anxiety, communication skills, explore cognitive beliefs and help client to develop healthy cognitive patterns and beliefs.      Patient has reviewed and agreed to the above plan.      Ricarda Bhatia [facility]

## 2024-01-26 RX ORDER — PANTOPRAZOLE SODIUM 40 MG/1
40 TABLET, DELAYED RELEASE ORAL 2 TIMES DAILY WITH MEALS
Qty: 60 TABLET | Refills: 5 | Status: SHIPPED | OUTPATIENT
Start: 2024-01-26 | End: 2024-07-23

## 2024-02-05 ASSESSMENT — ANXIETY QUESTIONNAIRES
GAD7 TOTAL SCORE: 21
6. BECOMING EASILY ANNOYED OR IRRITABLE: NEARLY EVERY DAY
GAD7 TOTAL SCORE: 21
5. BEING SO RESTLESS THAT IT IS HARD TO SIT STILL: NEARLY EVERY DAY
7. FEELING AFRAID AS IF SOMETHING AWFUL MIGHT HAPPEN: NEARLY EVERY DAY
2. NOT BEING ABLE TO STOP OR CONTROL WORRYING: NEARLY EVERY DAY
IF YOU CHECKED OFF ANY PROBLEMS ON THIS QUESTIONNAIRE, HOW DIFFICULT HAVE THESE PROBLEMS MADE IT FOR YOU TO DO YOUR WORK, TAKE CARE OF THINGS AT HOME, OR GET ALONG WITH OTHER PEOPLE: EXTREMELY DIFFICULT
4. TROUBLE RELAXING: NEARLY EVERY DAY
1. FEELING NERVOUS, ANXIOUS, OR ON EDGE: NEARLY EVERY DAY
8. IF YOU CHECKED OFF ANY PROBLEMS, HOW DIFFICULT HAVE THESE MADE IT FOR YOU TO DO YOUR WORK, TAKE CARE OF THINGS AT HOME, OR GET ALONG WITH OTHER PEOPLE?: EXTREMELY DIFFICULT
7. FEELING AFRAID AS IF SOMETHING AWFUL MIGHT HAPPEN: NEARLY EVERY DAY
3. WORRYING TOO MUCH ABOUT DIFFERENT THINGS: NEARLY EVERY DAY
GAD7 TOTAL SCORE: 21

## 2024-02-06 ENCOUNTER — THERAPY VISIT (OUTPATIENT)
Dept: PHYSICAL THERAPY | Facility: CLINIC | Age: 49
End: 2024-02-06
Attending: PREVENTIVE MEDICINE
Payer: COMMERCIAL

## 2024-02-06 DIAGNOSIS — M54.50 CHRONIC BILATERAL LOW BACK PAIN WITHOUT SCIATICA: Primary | ICD-10-CM

## 2024-02-06 DIAGNOSIS — G89.29 CHRONIC LOW BACK PAIN WITH BILATERAL SCIATICA, UNSPECIFIED BACK PAIN LATERALITY: ICD-10-CM

## 2024-02-06 DIAGNOSIS — M54.41 CHRONIC LOW BACK PAIN WITH BILATERAL SCIATICA, UNSPECIFIED BACK PAIN LATERALITY: ICD-10-CM

## 2024-02-06 DIAGNOSIS — M54.42 CHRONIC LOW BACK PAIN WITH BILATERAL SCIATICA, UNSPECIFIED BACK PAIN LATERALITY: ICD-10-CM

## 2024-02-06 DIAGNOSIS — M99.05 SOMATIC DYSFUNCTION OF PELVIC REGION: ICD-10-CM

## 2024-02-06 DIAGNOSIS — G89.29 CHRONIC BILATERAL LOW BACK PAIN WITHOUT SCIATICA: Primary | ICD-10-CM

## 2024-02-06 PROCEDURE — 97113 AQUATIC THERAPY/EXERCISES: CPT | Mod: GP | Performed by: PHYSICAL THERAPIST

## 2024-02-07 ENCOUNTER — VIRTUAL VISIT (OUTPATIENT)
Dept: PSYCHOLOGY | Facility: CLINIC | Age: 49
End: 2024-02-07
Payer: COMMERCIAL

## 2024-02-07 DIAGNOSIS — F41.1 GENERALIZED ANXIETY DISORDER: ICD-10-CM

## 2024-02-07 DIAGNOSIS — F33.1 MODERATE EPISODE OF RECURRENT MAJOR DEPRESSIVE DISORDER (H): Primary | ICD-10-CM

## 2024-02-07 DIAGNOSIS — M54.2 CHRONIC NECK PAIN: ICD-10-CM

## 2024-02-07 DIAGNOSIS — G89.29 CHRONIC NECK PAIN: ICD-10-CM

## 2024-02-07 DIAGNOSIS — G89.29 CHRONIC BILATERAL LOW BACK PAIN WITH BILATERAL SCIATICA: ICD-10-CM

## 2024-02-07 DIAGNOSIS — G89.29 CHRONIC BILATERAL THORACIC BACK PAIN: ICD-10-CM

## 2024-02-07 DIAGNOSIS — M54.42 CHRONIC BILATERAL LOW BACK PAIN WITH BILATERAL SCIATICA: ICD-10-CM

## 2024-02-07 DIAGNOSIS — M54.41 CHRONIC BILATERAL LOW BACK PAIN WITH BILATERAL SCIATICA: ICD-10-CM

## 2024-02-07 DIAGNOSIS — M79.18 MYOFASCIAL PAIN: ICD-10-CM

## 2024-02-07 DIAGNOSIS — M54.6 CHRONIC BILATERAL THORACIC BACK PAIN: ICD-10-CM

## 2024-02-07 PROCEDURE — 90834 PSYTX W PT 45 MINUTES: CPT | Mod: 95 | Performed by: COUNSELOR

## 2024-02-07 NOTE — PROGRESS NOTES
Answers submitted by the patient for this visit:  BO-7 (Submitted on 2/5/2024)  BO 7 TOTAL SCORE: 21  Answers submitted by the patient for this visit:  BO-7 (Submitted on 1/18/2024)  BO 7 TOTAL SCORE: 20        [facility]                                     Progress Note    Patient Name: Radha Beckwith  Date: 2/7/24         Service Type: Individual      Session Start Time: 2:10pm Session End Time: 3:00pm     Session Length: 50mins    Session #: 24    Attendees: Client    Service Modality:  Video Visit:      Provider verified identity through the following two step process.  Patient provided:  Patient is known previously to provider    Telemedicine Visit: The patient's condition can be safely assessed and treated via synchronous audio and visual telemedicine encounter.      Reason for Telemedicine Visit: Patient convenience (e.g. access to timely appointments / distance to available provider)    Originating Site (Patient Location): Patient's home    Distant Site (Provider Location): Provider Remote Setting- Home Office    Consent:  The patient/guardian has verbally consented to: the potential risks and benefits of telemedicine (video visit) versus in person care; bill my insurance or make self-payment for services provided; and responsibility for payment of non-covered services.     Patient would like the video invitation sent by:  My Chart    Mode of Communication:  Video Conference via [employer]well    Distant Location (Provider):  On-site    As the provider I attest to compliance with applicable laws and regulations related to telemedicine.    DATA  Interactive Complexity: No  Crisis: No        Progress Since Last Session (Related to Symptoms / Goals / Homework):   Symptoms: No change .    Homework: Completed in session      Episode of Care Goals: Minimal progress - ACTION (Actively working towards change); Intervened by reinforcing change plan / affirming steps taken     Current / Ongoing  Stressors and Concerns:   The client stated she overslept and was late today. She stated she's feeling really tired today.   She just finished her social security application.   Started pool therapy this week. Hasit twice a week for 8 weeks.  Noticing possible allergic reaction/sinus issues. Noticing short term memory issues again.   Talked with boyfriend about finances.        Treatment Objective(s) Addressed in This Session:   Increase interest, engagement, and pleasure in doing things  Feel less tired and more energy during the day        Intervention:   Shared in the excitement of the pool therapy with the client. It seems this might be really helpful for her. Reinforced the client being vulnerable with her boyfriend and making the time to talk with him. Encouraged her to keep the conversations going.     Assessments completed prior to visit:  The following assessments were completed by patient for this visit:  PHQ9:       11/26/2023    10:37 PM 12/14/2023    10:47 AM 12/18/2023    11:01 AM 1/4/2024     2:01 PM 1/10/2024     6:40 PM 1/18/2024     2:56 PM 1/31/2024    11:58 PM   PHQ-9 SCORE   PHQ-9 Total Score MyChart 20 (Severe depression)  19 (Moderately severe depression) 20 (Severe depression) 22 (Severe depression) 23 (Severe depression) 18 (Moderately severe depression)   PHQ-9 Total Score 20 20 19 20 22 23 18     GAD7:       10/4/2023    12:32 PM 10/30/2023    12:23 PM 11/19/2023     9:32 AM 12/10/2023     4:50 AM 1/2/2024    10:50 AM 1/18/2024     2:56 PM 2/5/2024     6:27 PM   BO-7 SCORE   Total Score 19 (severe anxiety) 18 (severe anxiety) 16 (severe anxiety) 21 (severe anxiety) 20 (severe anxiety) 20 (severe anxiety) 21 (severe anxiety)   Total Score 19 18    18 16 21 20 20    20 21     PROMIS 10-Global Health (all questions and answers displayed):       2/7/2023    12:57 PM 4/17/2023     2:12 PM 6/25/2023     2:45 PM 8/10/2023    12:50 PM 11/10/2023     9:20 PM 11/19/2023     9:34 AM 11/26/2023     10:43 PM   PROMIS 10   In general, would you say your health is:  Poor Poor Fair Fair Poor Fair   In general, would you say your quality of life is:  Poor Fair Poor Fair Poor Poor   In general, how would you rate your physical health?  Poor Poor Poor Fair Poor Poor   In general, how would you rate your mental health, including your mood and your ability to think?  Fair Fair Poor Fair Poor Poor   In general, how would you rate your satisfaction with your social activities and relationships?  Fair Poor Fair Fair Poor Poor   In general, please rate how well you carry out your usual social activities and roles  Fair Poor Poor Fair Poor Fair   To what extent are you able to carry out your everyday physical activities such as walking, climbing stairs, carrying groceries, or moving a chair?  A little Moderately A little Moderately Mostly A little   In the past 7 days, how often have you been bothered by emotional problems such as feeling anxious, depressed, or irritable?  Always Always Always Often Always Always   In the past 7 days, how would you rate your fatigue on average?  Moderate Severe Severe Severe Severe Severe   In the past 7 days, how would you rate your pain on average, where 0 means no pain, and 10 means worst imaginable pain?  8 7 7 7 7 7   In general, would you say your health is:  1 1 2 2 1 2   In general, would you say your quality of life is:  1 2 1 2 1 1   In general, how would you rate your physical health?  1 1 1 2 1 1   In general, how would you rate your mental health, including your mood and your ability to think?  2 2 1 2 1 1   In general, how would you rate your satisfaction with your social activities and relationships?  2 1 2 2 1 1   In general, please rate how well you carry out your usual social activities and roles. (This includes activities at home, at work and in your community, and responsibilities as a parent, child, spouse, employee, friend, etc.)  2 1 1 2 1 2   To what extent are you  able to carry out your everyday physical activities such as walking, climbing stairs, carrying groceries, or moving a chair?  2 3 2 3 4 2   In the past 7 days, how often have you been bothered by emotional problems such as feeling anxious, depressed, or irritable?  5 5 5 4 5 5   In the past 7 days, how would you rate your fatigue on average?  3 4 4 4 4 4   In the past 7 days, how would you rate your pain on average, where 0 means no pain, and 10 means worst imaginable pain?  8 7 7 7 7 7   Global Mental Health Score  6    6 6 5 8 4 4   Global Physical Health Score  8    8 8 7 9 9 7   PROMIS TOTAL - SUBSCORES  14    14 14 12 17 13 11       Information is confidential and restricted. Go to Review Flowsheets to unlock data.    Multiple values from one day are sorted in reverse-chronological order     [place] Suicide Severity Rating Scale (Lifetime/Recent)      10/4/2022    11:00 AM   [place] Suicide Severity Rating (Lifetime/Recent)   Q1 Wished to be Dead (Past Month) no   Q2 Suicidal Thoughts (Past Month) no   Q3 Suicidal Thought Method no   Q4 Suicidal Intent without Specific Plan no   Q5 Suicide Intent with Specific Plan yes   Q6 Suicide Behavior (Lifetime) yes   Within the Past 3 Months? no   Level of Risk per Screen high risk         ASSESSMENT: Current Emotional / Mental Status (status of significant symptoms):   Risk status (Self / Other harm or suicidal ideation)   Patient denies current fears or concerns for personal safety.   Patient denies current or recent suicidal ideation or behaviors.   Patient denies current or recent homicidal ideation or behaviors.   Patient denies current or recent self injurious behavior or ideation.   Patient denies other safety concerns.   Patient reports there has been no change in risk factors since their last session.     Patient reports there has been no change in protective factors since their last session.     Recommended that patient call 911 or go to the local ED should  there be a change in any of these risk factors.     Appearance:   Appropriate    Eye Contact:   Good    Psychomotor Behavior: Normal    Attitude:   Cooperative    Orientation:   All   Speech    Rate / Production: Normal/ Responsive Normal     Volume:  Normal    Mood:    Irritable  Sad    Affect:    Appropriate    Thought Content:  Clear    Thought Form:  Coherent  Logical    Insight:    Good      Medication Review:   No changes to current psychiatric medication(s)     Medication Compliance:   Yes     Changes in Health Issues:   None reported     Chemical Use Review:   Substance Use: Chemical use reviewed, no active concerns identified      Tobacco Use: No change in amount of tobacco use since last session.  Patient declined discussion at this time    Diagnosis:  1. Moderate episode of recurrent major depressive disorder (H)    2. Generalized anxiety disorder          Collateral Reports Completed:   Not Applicable    PLAN: (Patient Tasks / Therapist Tasks / Other)  Continue to work on stress reduction skills. Talk with boyfriend about needs.         Ricarda Bhatia [facility]                                                         ______________________________________________________________________    Individual Treatment Plan    Patient's Name: Radha Beckwith  YOB: 1975    Date of Creation: 6/28/23  Date Treatment Plan Last Reviewed/Revised: 12/7/23    DSM5 Diagnoses: 296.32 (F33.1) Major Depressive Disorder, Recurrent Episode, Moderate _  Psychosocial / Contextual Factors: living with boyfriend, memory issues  PROMIS (reviewed every 90 days):     Referral / Collaboration:  Referral to another professional/service is not indicated at this time..    Anticipated number of session for this episode of care: 9-12 sessions  Anticipation frequency of session:  as needed  Anticipated Duration of each session: 38-52 minutes  Treatment plan will be reviewed in 90 days or when goals have been changed.        MeasurableTreatment Goal(s) related to diagnosis / functional impairment(s)  Goal 1: Patient will increase communication skills with family members.    Objective #A (Patient Action)    Patient will learn & utilize at least 2 assertive communication skills weekly.  Status: Continued - Date(s): 12/7/23     Intervention(s)  Therapist will teach emotional regulation skills. distress tolerance skills, interpersonal effectiveness skills, emotion regluation skills, mindfulness skills, radical acceptance. Therapist will teach client how to ID body cues for anxiety, anxiety reduction techniques, how to ID triggers for depression and anxiety- decrease reactivity/eliminate, lifestyle changes to reduce depression and anxiety, communication skills, explore cognitive beliefs and help client to develop healthy cognitive patterns and beliefs.    Objective #B  Patient will use thought-stopping strategy daily to reduce intrusive thoughts.  Status: Continued - Date(s): 12/7/23    Intervention(s)  Therapist will teach emotional regulation skills. distress tolerance skills, interpersonal effectiveness skills, emotion regluation skills, mindfulness skills, radical acceptance. Therapist will teach client how to ID body cues for anxiety, anxiety reduction techniques, how to ID triggers for depression and anxiety- decrease reactivity/eliminate, lifestyle changes to reduce depression and anxiety, communication skills, explore cognitive beliefs and help client to develop healthy cognitive patterns and beliefs.      Goal 2: Patient will decrease depression symptoms.      Objective #A (Patient Action)    Status: Continued - Date(s): 12/7/23    Patient will Increase interest, engagement, and pleasure in doing things  Decrease frequency and intensity of feeling down, depressed, hopeless  Improve quantity and quality of night time sleep / decrease daytime naps  Feel less tired and more energy during the day   Improve diet, appetite, mindful  eating, and / or meal planning  Identify negative self-talk and behaviors: challenge core beliefs, myths, and actions  Improve concentration, focus, and mindfulness in daily activities   Feel less fidgety, restless or slow in daily activities / interpersonal interactions.    Intervention(s)  Therapist will teach emotional regulation skills. distress tolerance skills, interpersonal effectiveness skills, emotion regluation skills, mindfulness skills, radical acceptance. Therapist will teach client how to ID body cues for anxiety, anxiety reduction techniques, how to ID triggers for depression and anxiety- decrease reactivity/eliminate, lifestyle changes to reduce depression and anxiety, communication skills, explore cognitive beliefs and help client to develop healthy cognitive patterns and beliefs.    Objective #B  Patient will identify three distraction and diversion activities and use those activities to decrease level of anxiety  .    Status: Continued - Date(s):  12/7/23    Intervention(s)  Therapist will teach emotional regulation skills. distress tolerance skills, interpersonal effectiveness skills, emotion regluation skills, mindfulness skills, radical acceptance. Therapist will teach client how to ID body cues for anxiety, anxiety reduction techniques, how to ID triggers for depression and anxiety- decrease reactivity/eliminate, lifestyle changes to reduce depression and anxiety, communication skills, explore cognitive beliefs and help client to develop healthy cognitive patterns and beliefs.      Patient has reviewed and agreed to the above plan.      Ricarda Bhatia [facility]

## 2024-02-08 ENCOUNTER — THERAPY VISIT (OUTPATIENT)
Dept: PHYSICAL THERAPY | Facility: CLINIC | Age: 49
End: 2024-02-08
Attending: PREVENTIVE MEDICINE
Payer: COMMERCIAL

## 2024-02-08 DIAGNOSIS — G89.29 CHRONIC BILATERAL LOW BACK PAIN WITHOUT SCIATICA: Primary | ICD-10-CM

## 2024-02-08 DIAGNOSIS — M54.50 CHRONIC BILATERAL LOW BACK PAIN WITHOUT SCIATICA: Primary | ICD-10-CM

## 2024-02-08 DIAGNOSIS — M99.05 SOMATIC DYSFUNCTION OF PELVIC REGION: ICD-10-CM

## 2024-02-08 DIAGNOSIS — M79.18 MYOFASCIAL PAIN: ICD-10-CM

## 2024-02-08 PROCEDURE — 97113 AQUATIC THERAPY/EXERCISES: CPT | Mod: GP | Performed by: PHYSICAL THERAPIST

## 2024-02-08 RX ORDER — BACLOFEN 20 MG/1
20 TABLET ORAL 2 TIMES DAILY
Qty: 60 TABLET | Refills: 1 | Status: SHIPPED | OUTPATIENT
Start: 2024-02-08 | End: 2024-04-05

## 2024-02-08 NOTE — TELEPHONE ENCOUNTER
Please process a refill of BACLOFEN 20MG TABS  to     [facility] [place]  [place], [place] - [address] [address]  Phone: [phone] Fax: [phone]

## 2024-02-08 NOTE — TELEPHONE ENCOUNTER
Received fax from pharmacy requesting refill(s) for     BACLOFEN 20MG TABS    Date last filled 01/03/2024    Last Appt Date:01/03/2024    Next Appt scheduled: 02/13/2024    Pharmacy:   [facility], [place] - [address] [place] schrader for processing    Peyton Gomez MA  [facility]

## 2024-02-09 ENCOUNTER — HOSPITAL ENCOUNTER (OUTPATIENT)
Dept: ULTRASOUND IMAGING | Facility: CLINIC | Age: 49
Discharge: HOME | End: 2024-02-09
Attending: NURSE PRACTITIONER | Admitting: NURSE PRACTITIONER
Payer: COMMERCIAL

## 2024-02-09 DIAGNOSIS — R10.2 PELVIC PAIN IN FEMALE: ICD-10-CM

## 2024-02-09 PROCEDURE — 76830 TRANSVAGINAL US NON-OB: CPT

## 2024-02-09 PROCEDURE — 76856 US EXAM PELVIC COMPLETE: CPT

## 2024-02-09 NOTE — TELEPHONE ENCOUNTER
Chart reviewed - Request appears appropriate. Refilled for 30 day supply.     Jenny Landrum DNP, APRN, AGNP-C  [facility]

## 2024-02-12 ENCOUNTER — MYC MEDICAL ADVICE (OUTPATIENT)
Dept: OTOLARYNGOLOGY | Facility: CLINIC | Age: 49
End: 2024-02-12

## 2024-02-12 ENCOUNTER — MYC MEDICAL ADVICE (OUTPATIENT)
Dept: FAMILY MEDICINE | Facility: CLINIC | Age: 49
End: 2024-02-12

## 2024-02-12 ENCOUNTER — OFFICE VISIT (OUTPATIENT)
Dept: OTOLARYNGOLOGY | Facility: CLINIC | Age: 49
End: 2024-02-12
Payer: COMMERCIAL

## 2024-02-12 ENCOUNTER — MYC MEDICAL ADVICE (OUTPATIENT)
Dept: PALLIATIVE MEDICINE | Facility: CLINIC | Age: 49
End: 2024-02-12

## 2024-02-12 ENCOUNTER — THERAPY VISIT (OUTPATIENT)
Dept: PHYSICAL THERAPY | Facility: CLINIC | Age: 49
End: 2024-02-12
Attending: PREVENTIVE MEDICINE
Payer: COMMERCIAL

## 2024-02-12 VITALS
HEIGHT: 64 IN | TEMPERATURE: 98.7 F | BODY MASS INDEX: 31.65 KG/M2 | SYSTOLIC BLOOD PRESSURE: 124 MMHG | WEIGHT: 185.4 LBS | DIASTOLIC BLOOD PRESSURE: 80 MMHG

## 2024-02-12 DIAGNOSIS — M79.18 MYOFASCIAL PAIN: ICD-10-CM

## 2024-02-12 DIAGNOSIS — H93.13 TINNITUS, BILATERAL: ICD-10-CM

## 2024-02-12 DIAGNOSIS — F41.9 ANXIETY: ICD-10-CM

## 2024-02-12 DIAGNOSIS — R42 DISEQUILIBRIUM: ICD-10-CM

## 2024-02-12 DIAGNOSIS — M99.05 SOMATIC DYSFUNCTION OF PELVIC REGION: Primary | ICD-10-CM

## 2024-02-12 DIAGNOSIS — K21.9 LPRD (LARYNGOPHARYNGEAL REFLUX DISEASE): ICD-10-CM

## 2024-02-12 DIAGNOSIS — M54.2 CERVICALGIA: Primary | ICD-10-CM

## 2024-02-12 DIAGNOSIS — G89.29 CHRONIC BILATERAL LOW BACK PAIN WITHOUT SCIATICA: ICD-10-CM

## 2024-02-12 DIAGNOSIS — M54.50 CHRONIC BILATERAL LOW BACK PAIN WITHOUT SCIATICA: ICD-10-CM

## 2024-02-12 LAB — HOLD SPECIMEN: NORMAL

## 2024-02-12 PROCEDURE — 97113 AQUATIC THERAPY/EXERCISES: CPT | Mod: GP | Performed by: PHYSICAL THERAPIST

## 2024-02-12 PROCEDURE — 99214 OFFICE O/P EST MOD 30 MIN: CPT | Performed by: OTOLARYNGOLOGY

## 2024-02-12 NOTE — CONFIDENTIAL NOTE
Will discuss mychart msg further at visit today. Last audiogram on file is 7/2022. Alysha Crespo CMA

## 2024-02-12 NOTE — PROGRESS NOTES
History of Present Illness - Radha Beckwith is a 48 year old female presenting in clinic today for a recheck on Patient presents with:  Follow Up: Balance issues, ear pain, sinus    Patient was previously seen for occasional disequilibrium and imbalance comes in now with many issues.  Patient and 1 still occasional imbalance and disequilibrium even though does not bother her too much.  Patient #2 a lot of neck issues with pulsatile tinnitus regular tinnitus.  Patient does have bilateral sensorineural hearing loss and already wears hearing aids.  Even with hearing aids and she gets tinnitus.  She had a lot of imaging done for cervical problems and possible cervical and cranial vascular problem evaluation with CTA.  She had incidental finding of some thickening of the esophagus and had EGD subsequently that was normal.  All other tests results are enumerated below.    Present Symptoms include: She apparently also endorses discomfort and pain in her neck radiating on the right side from the ear into her neck and vice versa sharp pains.  Denies history of bruxism.  He is being treated for some neck issues.  However has not had any physical therapy specifically for the neck..  EXAM: CT HEAD W/O CONTRAST, CT CERVICAL SPINE W/O CONTRAST  LOCATION: [facility]  DATE: 9/7/2023     INDICATION: Headache; chronic HA (>= 3 months), no complicating feature. Chronic neck and back pain. Numbness all over. Dizziness. Lightheadedness. Worsening symptoms.  COMPARISON: None.  TECHNIQUE:   1) Routine CT Head without IV contrast. Multiplanar reformats. Dose reduction techniques were used.  2) Routine CT Cervical Spine without IV contrast. Multiplanar reformats. Dose reduction techniques were used.     FINDINGS:   HEAD CT:   INTRACRANIAL CONTENTS: No intracranial hemorrhage, extraaxial collection, or mass effect. No CT evidence of acute infarct. Normal parenchymal attenuation. Normal ventricles and  sulci.      VISUALIZED ORBITS/SINUSES/MASTOIDS: No intraorbital abnormality. No paranasal sinus mucosal disease. Trace right mastoid effusion.     BONES/SOFT TISSUES: No calvarial fracture.     CERVICAL SPINE CT:   VERTEBRA: Normal vertebral body heights. Mild reversal of the normal cervical lordosis. No fracture or posttraumatic subluxation.      CANAL/FORAMINA: At C4-C5 there is a small left of midline disc protrusion which causes mild left hemicanal stenosis. No high-grade central spinal canal stenosis. There is mild right neural foraminal stenosis at C4-C5. No high-grade neural foraminal   stenosis.     PARASPINAL: No extraspinal abnormality. Mild upper lobe paraseptal emphysema.                                                                      IMPRESSION:  HEAD CT:  1.  No definite acute intracranial abnormality. If the patient is experiencing an acute, focal, or ongoing neurologic deficit, an MRI may be indicated.      CERVICAL SPINE CT:  1.  No CT evidence for acute fracture or post traumatic subluxation.  2.  Degenerative changes, as above. Consider follow-up cervical spine MRI as clinically indicated.    EXAM: CTA HEAD NECK W CONTRAST  LOCATION: [facility]  DATE: 9/7/2023     INDICATION: Headache; Chronic HA (>= 3 months); Chronic HA (>= 3 months), no complicating feature  COMPARISON: Head CT on the same date  CONTRAST: 80mL Isovue 370  TECHNIQUE: Head and neck CT angiogram with IV contrast. axial helical CT images of the head and neck vessels obtained during the arterial phase of intravenous contrast administration. Axial 2D reconstructed images and multiplanar 3D MIP reconstructed   images of the head and neck vessels were performed by the technologist. Dose reduction techniques were used. All stenosis measurements made according to NASCET criteria unless otherwise specified.     FINDINGS:      HEAD CTA:  ANTERIOR CIRCULATION: No stenosis/occlusion, aneurysm, or high  flow vascular malformation. Standard [place] anatomy.     POSTERIOR CIRCULATION: No stenosis/occlusion, aneurysm, or high flow vascular malformation. Balanced vertebral arteries supply a normal basilar artery.      DURAL VENOUS SINUSES: Expected enhancement of the major dural venous sinuses.     NECK CTA:  RIGHT CAROTID: No measurable stenosis or dissection.     LEFT CAROTID: No measurable stenosis or dissection.     VERTEBRAL ARTERIES: No focal stenosis or dissection. Balanced vertebral arteries.     AORTIC ARCH: Classic aortic arch anatomy with no significant stenosis at the origin of the great vessels.     NONVASCULAR STRUCTURES: Unremarkable.                                                                      IMPRESSION:      HEAD CTA:   1.  Normal CTA [place].     NECK CTA:  1.  Normal neck CTA.  MRI OF THE CERVICAL SPINE WITHOUT CONTRAST 9/26/2023 1:38 PM     COMPARISON: CT cervical spine 9/7/2023.     HISTORY: Neck pain.     TECHNIQUE: Multiplanar, multisequence MRI images of the cervical spine  were acquired without intravenous contrast.     FINDINGS: There is normal alignment of the cervical vertebrae.  Vertebral body heights of the cervical spine are normal. Marrow signal  throughout the cervical vertebrae is within normal limits. There is no  evidence for fracture or pathologic bony lesion of the cervical spine.        There is loss of disc height, disc desiccation and posterior disc  bulging to varying degrees at all levels of the cervical spine with  exception of the normal appearing C2-C3 and C7-T1 discs.      The cervical spinal cord is normal in contour. There is no abnormal  signal in the cervical spinal cord. There is no evidence for  intrathecal abnormality.     Level by level:     C2-C3: Normal disc height and contour. Normal facets. No spinal canal  stenosis. No neural foraminal stenosis on either side.      C3-C4: There is facet arthropathy bilaterally, uncinate  hypertrophy  bilaterally and a posterior broad-based disc-osteophyte complex. No  spinal canal stenosis. No foraminal stenosis on either side.     C4-C5: There is facet arthropathy bilaterally, uncinate hypertrophy  bilaterally and a posterior broad-based disc-osteophyte complex. No  spinal canal stenosis. Moderate right foraminal stenosis. Mild left  foraminal narrowing.     C5-C6: There is facet arthropathy bilaterally, uncinate hypertrophy  bilaterally and a posterior broad-based disc-osteophyte complex. No  spinal canal stenosis. No left foraminal stenosis. Mild right  foraminal narrowing.     C6-C7: There is facet arthropathy bilaterally, uncinate hypertrophy  bilaterally and a posterior broad-based disc-osteophyte complex. No  spinal canal stenosis. No foraminal stenosis on either side.     C7-T1: Normal disc height and contour. Normal facets. No spinal canal  stenosis. No neural foraminal stenosis on either side.                                                                       IMPRESSION:  1. Diffuse degenerative change of the cervical spine as detailed  above.  2. No spinal canal stenosis of the cervical spine.  3. Moderate neural foraminal stenosis on the right at C4-C5. No other  high-grade neural foraminal narrowing of the cervical spine.       Her other issues discomfort in the left side of the nose with the redness on the left side.  Does get somewhat dry and clinical specially on the left.  No nosebleeds noted.  Denies any history or symptoms of nasal obstruction.    She also has a feeling of discomfort in the back of her throat feeling of phlegm need to clear her throat.  She is known to have significant acid reflux.  Has been on pantoprazole twice daily and famotidine during the day.    However a second dose in the evening she takes at bedtime instead of before supper of pantoprazole.      BP Readings from Last 1 Encounters:   02/12/24 124/80       BP noted to be well controlled today in office.      Radha IS a smoker/uses chewing tobacco.  Radha is ready to quit      Past Medical History -   Past Medical History:   Diagnosis Date    Abdominal pain, right lower quadrant 03/09/2008    Admit. Discharged 03/10/08    Anxiety attack 07/31/2015    Atypical chest pain 06/23/2015    De Quervain's disease (tenosynovitis)     Dehydration     Depressive disorder 1996    Gastric ulcer 07/31/2015    GERD (gastroesophageal reflux disease) 07/28/2010    Takes omeprazole and metoclopramide     Hypertension 2017    Ingrowing nail 01/09/2014    Migraines     Opioid dependence in remission (H) 08/02/2015    Other and unspecified ovarian cyst     Papanicolaou smear of cervix with low grade squamous intraepithelial lesion (LGSIL) 07/07/2017       Current Medications -   Current Outpatient Medications:     acetaminophen (TYLENOL) 500 MG tablet, Take 1,000 mg by mouth every 6 hours as needed for mild pain, Disp: , Rfl:     ALPRAZolam (XANAX) 0.5 MG tablet, TAKE ONE TABLET BY MOUTH TWICE A DAY, Disp: 60 tablet, Rfl: 0    baclofen (LIORESAL) 20 MG tablet, TAKE ONE TABLET BY MOUTH TWICE A DAY, Disp: 60 tablet, Rfl: 1    bisacodyl (DULCOLAX) 5 MG EC tablet, Take 1 tablet (5 mg) by mouth every other day Take every other day. If no bowel movement for 2 or more days, take 2 tablets of bisacodyl (10 mg), Disp: 30 tablet, Rfl: 2    buprenorphine HCl-naloxone HCl (SUBOXONE) 2-0.5 MG per film, Place 0.5 Film under the tongue daily , Disp: , Rfl:     cetirizine (ZYRTEC) 10 MG tablet, TAKE ONE TABLET BY MOUTH EVERY MORNING (Patient taking differently: Take 10 mg by mouth daily), Disp: 90 tablet, Rfl: 3    cyclobenzaprine (FLEXERIL) 5 MG tablet, Take 1-2 tablets (5-10 mg) by mouth 3 times daily as needed for muscle spasms, Disp: 90 tablet, Rfl: 5    DULoxetine (CYMBALTA) 30 MG capsule, Take 60 mg in morning and 30 mg in evening., Disp: 270 capsule, Rfl: 0    famotidine (PEPCID) 40 MG tablet, TAKE 1 TABLET (40 MG) BY MOUTH 2 TIMES DAILY, Disp:  60 tablet, Rfl: 1    gabapentin (NEURONTIN) 300 MG capsule, Take 2 capsules (600 mg) by mouth 3 times daily CURRENT DOSE IS  1 CAPSULE  THREE TIMES A DAY . INCREASE BY 1 CAPSULE EVERY 3 DAYS UNTIL GOAL DOSE OF  2 CAPSULES BY MOUTH   THREE TIMES A DAY IS  ACHIEVED., Disp: 180 capsule, Rfl: 1    lactulose (CHRONULAC) 10 GM/15ML solution, Take 15 mLs (10 g) by mouth 3 times daily as needed for constipation (as needed for severe constipaiton, no BM for more than 3 days and feeling constipated), Disp: 300 mL, Rfl: 3    ondansetron (ZOFRAN ODT) 4 MG ODT tab, DISSOLVE ONE TABLET ON TONGUE EVERY SIX HOURS AS NEEDED FOR NAUSEA, Disp: 20 tablet, Rfl: 1    pantoprazole (PROTONIX) 40 MG EC tablet, TAKE ONE TABLET BY MOUTH TWICE A DAY WITH MEALS, Disp: 60 tablet, Rfl: 5    polyethylene glycol (MIRALAX) 17 GM/Dose powder, Take 17 g by mouth daily, Disp: 578 g, Rfl: 3    rOPINIRole (REQUIP) 1 MG tablet, TAKE ONE TABLET BY MOUTH EVERY NIGHT AT BEDTIME, Disp: 90 tablet, Rfl: 0    simvastatin (ZOCOR) 10 MG tablet, TAKE ONE TABLET BY MOUTH EVERY NIGHT AT BEDTIME, Disp: 90 tablet, Rfl: 0    zolpidem (AMBIEN) 5 MG tablet, TAKE ONE TABLET (5 MG) BY MOUTH NIGHTLY AS NEEDED FOR SLEEP, Disp: 30 tablet, Rfl: 1    traZODone (DESYREL) 50 MG tablet, Take 1-2 tablets ( mg) by mouth nightly as needed for sleep, Disp: 120 tablet, Rfl: 1    Allergies -   Allergies   Allergen Reactions    Ergotamine-Caffeine      12-            GI problems-    Seasonal Allergies     Sumatriptan      vomits after giving herself a shot    Compazine Anxiety    Droperidol Anxiety    Nubain [Nalbuphine Hcl] Anxiety    Prochlorperazine Palpitations     Uncontrolled movement       Social History -   Social History     Socioeconomic History    Marital status: Single   Tobacco Use    Smoking status: Every Day     Packs/day: 0.25     Years: 20.00     Additional pack years: 0.00     Total pack years: 5.00     Types: Cigarettes    Smokeless tobacco: Never   Vaping  Use    Vaping Use: Some days    Substances: Nicotine, CBD    Devices: Refillable tank   Substance and Sexual Activity    Alcohol use: Yes     Comment: Occasionaly    Drug use: No    Sexual activity: Not Currently     Partners: Male     Birth control/protection: Female Surgical   Other Topics Concern    Parent/sibling w/ CABG, MI or angioplasty before 65F 55M? No     Social Determinants of Health     Financial Resource Strain: Low Risk  (1/8/2024)    Financial Resource Strain     Within the past 12 months, have you or your family members you live with been unable to get utilities (heat, electricity) when it was really needed?: No   Food Insecurity: Low Risk  (1/8/2024)    Food Insecurity     Within the past 12 months, did you worry that your food would run out before you got money to buy more?: No     Within the past 12 months, did the food you bought just not last and you didn t have money to get more?: No   Transportation Needs: High Risk (1/8/2024)    Transportation Needs     Within the past 12 months, has lack of transportation kept you from medical appointments, getting your medicines, non-medical meetings or appointments, work, or from getting things that you need?: Yes   Interpersonal Safety: Low Risk  (12/14/2023)    Interpersonal Safety     Do you feel physically and emotionally safe where you currently live?: Yes     Within the past 12 months, have you been hit, slapped, kicked or otherwise physically hurt by someone?: No     Within the past 12 months, have you been humiliated or emotionally abused in other ways by your partner or ex-partner?: No   Housing Stability: Low Risk  (1/8/2024)    Housing Stability     Do you have housing? : Yes     Are you worried about losing your housing?: No       Family History -   Family History   Problem Relation Age of Onset    Depression Mother     Respiratory Mother     Chronic Obstructive Pulmonary Disease Mother     Hypertension Mother     Anxiety Disorder Mother      "Cerebrovascular Disease Father         First stroke at age 28,  from 2nd when he was 55. Brain aneurysms.    Breast Cancer Cousin     Adrenal Disorder Other     Chronic Obstructive Pulmonary Disease Other     Asthma Brother        Review of Systems - As per HPI and PMHx, otherwise review of system review of the head and neck negative. Otherwise 10+ review of system is negative    Physical Exam  /80   Temp 98.7  F (37.1  C) (Temporal)   Ht 1.626 m (5' 4\")   Wt 84.1 kg (185 lb 6.4 oz)   LMP  (LMP Unknown)   BMI 31.82 kg/m    BMI: Body mass index is 31.82 kg/m .    General - The patient is well nourished and well developed, and appears to have good nutritional status.  Alert and oriented to person and place, answers questions and cooperates with examination appropriately.    SKIN - No suspicious lesions or rashes.  Respiration - No respiratory distress.  Head and Face - Normocephalic and atraumatic, with no gross asymmetry noted of the contour of the facial features.  The facial nerve is intact, with strong symmetric movements.    Voice and Breathing - The patient was breathing comfortably without the use of accessory muscles. The patients voice was clear and strong, and had appropriate pitch and quality.    Ears - Bilateral pinna and EACs with normal appearing overlying skin. Tympanic membrane intact with good mobility on pneumatic otoscopy bilaterally. Bony landmarks of the ossicular chain are normal. The tympanic membranes are normal in appearance. No retraction, perforation, or masses.  No fluid or purulence was seen in the external canal or the middle ear.     Eyes - Extraocular movements intact.  Sclera were not icteric or injected, conjunctiva were pink and moist.    Mouth - Examination of the oral cavity showed pink, healthy oral mucosa. No lesions or ulcerations noted.  The tongue was mobile and midline, and the dentition were in good condition.      Throat - The walls of the oropharynx were " smooth, pink, moist, symmetric, and had no lesions or ulcerations.  Some cobblestoning is noted in posterior pharyngeal mucosa with some larger spots.  The tonsillar pillars and soft palate were symmetric. . The uvula was midline on elevation.    Neck - Normal midline excursion of the laryngotracheal complex during swallowing.  Full range of motion on passive movement.  Palpation of the occipital, submental, submandibular, internal jugular chain, and supraclavicular nodes did not demonstrate any abnormal lymph nodes or masses.  The carotid pulse was palpable bilaterally.  Palpation of the thyroid was soft and smooth, with no nodules or goiter appreciated.  The trachea was mobile and midline.  A lot of tenderness and discomfort on both sides around TMJ's and sternocleidomastoid on palpation.  Some tightness noted in each sternocleidomastoid on palpation.    Nose - External contour is symmetric, no gross deflection or scars.  Nasal mucosa is pink and moist with no abnormal mucus.  The septum was midline and non-obstructive, turbinates of normal size and position.  No polyps, masses, or purulence noted on examination.    Neuro - Nonfocal neuro exam is normal, CN 2 through 12 intact, normal gait and muscle tone.      Performed in clinic today:  No procedures preformed in clinic today      A/P - Radha Beckwith is a 48 year old female Patient presents with:  Follow Up: Balance issues, ear pain, sinus    Patient with multitude of problems majority related to her cervical issues.  Will refer to physical therapy for the neck.  Certainly should help a lot of the pain that she is experiencing.  As far as her tinnitus conservative discussed managing tinnitus with avoidance of caffeine salt dark chocolate introducing white noise which can be programmed into hearing aids throughout the day.  In regard to her reflux certainly taking pantoprazole before supper will be much more than tedious as well as avoidance of heavy greasy  fried meals late meals are all discussed.    Radha should follow up as needed.  If she continues to have symptoms especially related to her imbalance.          Micha Hough MD

## 2024-02-12 NOTE — LETTER
2/12/2024         RE: Radha Beckwith  [id number] [address] [facility] [id number]        Dear Colleague,    Thank you for referring your patient, Radha Beckwith, to the [facility]. Please see a copy of my visit note below.    History of Present Illness - Radha Beckwith is a 48 year old female presenting in clinic today for a recheck on Patient presents with:  Follow Up: Balance issues, ear pain, sinus    Patient was previously seen for occasional disequilibrium and imbalance comes in now with many issues.  Patient and 1 still occasional imbalance and disequilibrium even though does not bother her too much.  Patient #2 a lot of neck issues with pulsatile tinnitus regular tinnitus.  Patient does have bilateral sensorineural hearing loss and already wears hearing aids.  Even with hearing aids and she gets tinnitus.  She had a lot of imaging done for cervical problems and possible cervical and cranial vascular problem evaluation with CTA.  She had incidental finding of some thickening of the esophagus and had EGD subsequently that was normal.  All other tests results are enumerated below.    Present Symptoms include: She apparently also endorses discomfort and pain in her neck radiating on the right side from the ear into her neck and vice versa sharp pains.  Denies history of bruxism.  He is being treated for some neck issues.  However has not had any physical therapy specifically for the neck..  EXAM: CT HEAD W/O CONTRAST, CT CERVICAL SPINE W/O CONTRAST  LOCATION: [facility]  DATE: 9/7/2023     INDICATION: Headache; chronic HA (>= 3 months), no complicating feature. Chronic neck and back pain. Numbness all over. Dizziness. Lightheadedness. Worsening symptoms.  COMPARISON: None.  TECHNIQUE:   1) Routine CT Head without IV contrast. Multiplanar reformats. Dose reduction techniques were used.  2) Routine CT Cervical Spine without IV contrast. Multiplanar  reformats. Dose reduction techniques were used.     FINDINGS:   HEAD CT:   INTRACRANIAL CONTENTS: No intracranial hemorrhage, extraaxial collection, or mass effect. No CT evidence of acute infarct. Normal parenchymal attenuation. Normal ventricles and sulci.      VISUALIZED ORBITS/SINUSES/MASTOIDS: No intraorbital abnormality. No paranasal sinus mucosal disease. Trace right mastoid effusion.     BONES/SOFT TISSUES: No calvarial fracture.     CERVICAL SPINE CT:   VERTEBRA: Normal vertebral body heights. Mild reversal of the normal cervical lordosis. No fracture or posttraumatic subluxation.      CANAL/FORAMINA: At C4-C5 there is a small left of midline disc protrusion which causes mild left hemicanal stenosis. No high-grade central spinal canal stenosis. There is mild right neural foraminal stenosis at C4-C5. No high-grade neural foraminal   stenosis.     PARASPINAL: No extraspinal abnormality. Mild upper lobe paraseptal emphysema.                                                                      IMPRESSION:  HEAD CT:  1.  No definite acute intracranial abnormality. If the patient is experiencing an acute, focal, or ongoing neurologic deficit, an MRI may be indicated.      CERVICAL SPINE CT:  1.  No CT evidence for acute fracture or post traumatic subluxation.  2.  Degenerative changes, as above. Consider follow-up cervical spine MRI as clinically indicated.    EXAM: CTA HEAD NECK W CONTRAST  LOCATION: [facility]  DATE: 9/7/2023     INDICATION: Headache; Chronic HA (>= 3 months); Chronic HA (>= 3 months), no complicating feature  COMPARISON: Head CT on the same date  CONTRAST: 80mL Isovue 370  TECHNIQUE: Head and neck CT angiogram with IV contrast. axial helical CT images of the head and neck vessels obtained during the arterial phase of intravenous contrast administration. Axial 2D reconstructed images and multiplanar 3D MIP reconstructed   images of the head and neck vessels were  performed by the technologist. Dose reduction techniques were used. All stenosis measurements made according to NASCET criteria unless otherwise specified.     FINDINGS:      HEAD CTA:  ANTERIOR CIRCULATION: No stenosis/occlusion, aneurysm, or high flow vascular malformation. Standard [place] anatomy.     POSTERIOR CIRCULATION: No stenosis/occlusion, aneurysm, or high flow vascular malformation. Balanced vertebral arteries supply a normal basilar artery.      DURAL VENOUS SINUSES: Expected enhancement of the major dural venous sinuses.     NECK CTA:  RIGHT CAROTID: No measurable stenosis or dissection.     LEFT CAROTID: No measurable stenosis or dissection.     VERTEBRAL ARTERIES: No focal stenosis or dissection. Balanced vertebral arteries.     AORTIC ARCH: Classic aortic arch anatomy with no significant stenosis at the origin of the great vessels.     NONVASCULAR STRUCTURES: Unremarkable.                                                                      IMPRESSION:      HEAD CTA:   1.  Normal CTA [place].     NECK CTA:  1.  Normal neck CTA.  MRI OF THE CERVICAL SPINE WITHOUT CONTRAST 9/26/2023 1:38 PM     COMPARISON: CT cervical spine 9/7/2023.     HISTORY: Neck pain.     TECHNIQUE: Multiplanar, multisequence MRI images of the cervical spine  were acquired without intravenous contrast.     FINDINGS: There is normal alignment of the cervical vertebrae.  Vertebral body heights of the cervical spine are normal. Marrow signal  throughout the cervical vertebrae is within normal limits. There is no  evidence for fracture or pathologic bony lesion of the cervical spine.        There is loss of disc height, disc desiccation and posterior disc  bulging to varying degrees at all levels of the cervical spine with  exception of the normal appearing C2-C3 and C7-T1 discs.      The cervical spinal cord is normal in contour. There is no abnormal  signal in the cervical spinal cord. There is no evidence  for  intrathecal abnormality.     Level by level:     C2-C3: Normal disc height and contour. Normal facets. No spinal canal  stenosis. No neural foraminal stenosis on either side.      C3-C4: There is facet arthropathy bilaterally, uncinate hypertrophy  bilaterally and a posterior broad-based disc-osteophyte complex. No  spinal canal stenosis. No foraminal stenosis on either side.     C4-C5: There is facet arthropathy bilaterally, uncinate hypertrophy  bilaterally and a posterior broad-based disc-osteophyte complex. No  spinal canal stenosis. Moderate right foraminal stenosis. Mild left  foraminal narrowing.     C5-C6: There is facet arthropathy bilaterally, uncinate hypertrophy  bilaterally and a posterior broad-based disc-osteophyte complex. No  spinal canal stenosis. No left foraminal stenosis. Mild right  foraminal narrowing.     C6-C7: There is facet arthropathy bilaterally, uncinate hypertrophy  bilaterally and a posterior broad-based disc-osteophyte complex. No  spinal canal stenosis. No foraminal stenosis on either side.     C7-T1: Normal disc height and contour. Normal facets. No spinal canal  stenosis. No neural foraminal stenosis on either side.                                                                       IMPRESSION:  1. Diffuse degenerative change of the cervical spine as detailed  above.  2. No spinal canal stenosis of the cervical spine.  3. Moderate neural foraminal stenosis on the right at C4-C5. No other  high-grade neural foraminal narrowing of the cervical spine.       Her other issues discomfort in the left side of the nose with the redness on the left side.  Does get somewhat dry and clinical specially on the left.  No nosebleeds noted.  Denies any history or symptoms of nasal obstruction.    She also has a feeling of discomfort in the back of her throat feeling of phlegm need to clear her throat.  She is known to have significant acid reflux.  Has been on pantoprazole twice daily and  famotidine during the day.    However a second dose in the evening she takes at bedtime instead of before supper of pantoprazole.      BP Readings from Last 1 Encounters:   02/12/24 124/80       BP noted to be well controlled today in office.     Radha IS a smoker/uses chewing tobacco.  Radha is ready to quit      Past Medical History -   Past Medical History:   Diagnosis Date     Abdominal pain, right lower quadrant 03/09/2008    Admit. Discharged 03/10/08     Anxiety attack 07/31/2015     Atypical chest pain 06/23/2015     De Quervain's disease (tenosynovitis)      Dehydration      Depressive disorder 1996     Gastric ulcer 07/31/2015     GERD (gastroesophageal reflux disease) 07/28/2010    Takes omeprazole and metoclopramide      Hypertension 2017     Ingrowing nail 01/09/2014     Migraines      Opioid dependence in remission (H) 08/02/2015     Other and unspecified ovarian cyst      Papanicolaou smear of cervix with low grade squamous intraepithelial lesion (LGSIL) 07/07/2017       Current Medications -   Current Outpatient Medications:      acetaminophen (TYLENOL) 500 MG tablet, Take 1,000 mg by mouth every 6 hours as needed for mild pain, Disp: , Rfl:      ALPRAZolam (XANAX) 0.5 MG tablet, TAKE ONE TABLET BY MOUTH TWICE A DAY, Disp: 60 tablet, Rfl: 0     baclofen (LIORESAL) 20 MG tablet, TAKE ONE TABLET BY MOUTH TWICE A DAY, Disp: 60 tablet, Rfl: 1     bisacodyl (DULCOLAX) 5 MG EC tablet, Take 1 tablet (5 mg) by mouth every other day Take every other day. If no bowel movement for 2 or more days, take 2 tablets of bisacodyl (10 mg), Disp: 30 tablet, Rfl: 2     buprenorphine HCl-naloxone HCl (SUBOXONE) 2-0.5 MG per film, Place 0.5 Film under the tongue daily , Disp: , Rfl:      cetirizine (ZYRTEC) 10 MG tablet, TAKE ONE TABLET BY MOUTH EVERY MORNING (Patient taking differently: Take 10 mg by mouth daily), Disp: 90 tablet, Rfl: 3     cyclobenzaprine (FLEXERIL) 5 MG tablet, Take 1-2 tablets (5-10 mg) by mouth 3  times daily as needed for muscle spasms, Disp: 90 tablet, Rfl: 5     DULoxetine (CYMBALTA) 30 MG capsule, Take 60 mg in morning and 30 mg in evening., Disp: 270 capsule, Rfl: 0     famotidine (PEPCID) 40 MG tablet, TAKE 1 TABLET (40 MG) BY MOUTH 2 TIMES DAILY, Disp: 60 tablet, Rfl: 1     gabapentin (NEURONTIN) 300 MG capsule, Take 2 capsules (600 mg) by mouth 3 times daily CURRENT DOSE IS  1 CAPSULE  THREE TIMES A DAY . INCREASE BY 1 CAPSULE EVERY 3 DAYS UNTIL GOAL DOSE OF  2 CAPSULES BY MOUTH   THREE TIMES A DAY IS  ACHIEVED., Disp: 180 capsule, Rfl: 1     lactulose (CHRONULAC) 10 GM/15ML solution, Take 15 mLs (10 g) by mouth 3 times daily as needed for constipation (as needed for severe constipaiton, no BM for more than 3 days and feeling constipated), Disp: 300 mL, Rfl: 3     ondansetron (ZOFRAN ODT) 4 MG ODT tab, DISSOLVE ONE TABLET ON TONGUE EVERY SIX HOURS AS NEEDED FOR NAUSEA, Disp: 20 tablet, Rfl: 1     pantoprazole (PROTONIX) 40 MG EC tablet, TAKE ONE TABLET BY MOUTH TWICE A DAY WITH MEALS, Disp: 60 tablet, Rfl: 5     polyethylene glycol (MIRALAX) 17 GM/Dose powder, Take 17 g by mouth daily, Disp: 578 g, Rfl: 3     rOPINIRole (REQUIP) 1 MG tablet, TAKE ONE TABLET BY MOUTH EVERY NIGHT AT BEDTIME, Disp: 90 tablet, Rfl: 0     simvastatin (ZOCOR) 10 MG tablet, TAKE ONE TABLET BY MOUTH EVERY NIGHT AT BEDTIME, Disp: 90 tablet, Rfl: 0     zolpidem (AMBIEN) 5 MG tablet, TAKE ONE TABLET (5 MG) BY MOUTH NIGHTLY AS NEEDED FOR SLEEP, Disp: 30 tablet, Rfl: 1     traZODone (DESYREL) 50 MG tablet, Take 1-2 tablets ( mg) by mouth nightly as needed for sleep, Disp: 120 tablet, Rfl: 1    Allergies -   Allergies   Allergen Reactions     Ergotamine-Caffeine      12-            GI problems-     Seasonal Allergies      Sumatriptan      vomits after giving herself a shot     Compazine Anxiety     Droperidol Anxiety     Nubain [Nalbuphine Hcl] Anxiety     Prochlorperazine Palpitations     Uncontrolled movement        Social History -   Social History     Socioeconomic History     Marital status: Single   Tobacco Use     Smoking status: Every Day     Packs/day: 0.25     Years: 20.00     Additional pack years: 0.00     Total pack years: 5.00     Types: Cigarettes     Smokeless tobacco: Never   Vaping Use     Vaping Use: Some days     Substances: Nicotine, CBD     Devices: Refillable tank   Substance and Sexual Activity     Alcohol use: Yes     Comment: Occasionaly     Drug use: No     Sexual activity: Not Currently     Partners: Male     Birth control/protection: Female Surgical   Other Topics Concern     Parent/sibling w/ CABG, MI or angioplasty before 65F 55M? No     Social Determinants of Health     Financial Resource Strain: Low Risk  (1/8/2024)    Financial Resource Strain      Within the past 12 months, have you or your family members you live with been unable to get utilities (heat, electricity) when it was really needed?: No   Food Insecurity: Low Risk  (1/8/2024)    Food Insecurity      Within the past 12 months, did you worry that your food would run out before you got money to buy more?: No      Within the past 12 months, did the food you bought just not last and you didn t have money to get more?: No   Transportation Needs: High Risk (1/8/2024)    Transportation Needs      Within the past 12 months, has lack of transportation kept you from medical appointments, getting your medicines, non-medical meetings or appointments, work, or from getting things that you need?: Yes   Interpersonal Safety: Low Risk  (12/14/2023)    Interpersonal Safety      Do you feel physically and emotionally safe where you currently live?: Yes      Within the past 12 months, have you been hit, slapped, kicked or otherwise physically hurt by someone?: No      Within the past 12 months, have you been humiliated or emotionally abused in other ways by your partner or ex-partner?: No   Housing Stability: Low Risk  (1/8/2024)    Housing  "Stability      Do you have housing? : Yes      Are you worried about losing your housing?: No       Family History -   Family History   Problem Relation Age of Onset     Depression Mother      Respiratory Mother      Chronic Obstructive Pulmonary Disease Mother      Hypertension Mother      Anxiety Disorder Mother      Cerebrovascular Disease Father         First stroke at age 28,  from 2nd when he was 55. Brain aneurysms.     Breast Cancer Cousin      Adrenal Disorder Other      Chronic Obstructive Pulmonary Disease Other      Asthma Brother        Review of Systems - As per HPI and PMHx, otherwise review of system review of the head and neck negative. Otherwise 10+ review of system is negative    Physical Exam  /80   Temp 98.7  F (37.1  C) (Temporal)   Ht 1.626 m (5' 4\")   Wt 84.1 kg (185 lb 6.4 oz)   LMP  (LMP Unknown)   BMI 31.82 kg/m    BMI: Body mass index is 31.82 kg/m .    General - The patient is well nourished and well developed, and appears to have good nutritional status.  Alert and oriented to person and place, answers questions and cooperates with examination appropriately.    SKIN - No suspicious lesions or rashes.  Respiration - No respiratory distress.  Head and Face - Normocephalic and atraumatic, with no gross asymmetry noted of the contour of the facial features.  The facial nerve is intact, with strong symmetric movements.    Voice and Breathing - The patient was breathing comfortably without the use of accessory muscles. The patients voice was clear and strong, and had appropriate pitch and quality.    Ears - Bilateral pinna and EACs with normal appearing overlying skin. Tympanic membrane intact with good mobility on pneumatic otoscopy bilaterally. Bony landmarks of the ossicular chain are normal. The tympanic membranes are normal in appearance. No retraction, perforation, or masses.  No fluid or purulence was seen in the external canal or the middle ear.     Eyes - Extraocular " movements intact.  Sclera were not icteric or injected, conjunctiva were pink and moist.    Mouth - Examination of the oral cavity showed pink, healthy oral mucosa. No lesions or ulcerations noted.  The tongue was mobile and midline, and the dentition were in good condition.      Throat - The walls of the oropharynx were smooth, pink, moist, symmetric, and had no lesions or ulcerations.  Some cobblestoning is noted in posterior pharyngeal mucosa with some larger spots.  The tonsillar pillars and soft palate were symmetric. . The uvula was midline on elevation.    Neck - Normal midline excursion of the laryngotracheal complex during swallowing.  Full range of motion on passive movement.  Palpation of the occipital, submental, submandibular, internal jugular chain, and supraclavicular nodes did not demonstrate any abnormal lymph nodes or masses.  The carotid pulse was palpable bilaterally.  Palpation of the thyroid was soft and smooth, with no nodules or goiter appreciated.  The trachea was mobile and midline.  A lot of tenderness and discomfort on both sides around TMJ's and sternocleidomastoid on palpation.  Some tightness noted in each sternocleidomastoid on palpation.    Nose - External contour is symmetric, no gross deflection or scars.  Nasal mucosa is pink and moist with no abnormal mucus.  The septum was midline and non-obstructive, turbinates of normal size and position.  No polyps, masses, or purulence noted on examination.    Neuro - Nonfocal neuro exam is normal, CN 2 through 12 intact, normal gait and muscle tone.      Performed in clinic today:  No procedures preformed in clinic today      A/P - Radha Beckwith is a 48 year old female Patient presents with:  Follow Up: Balance issues, ear pain, sinus    Patient with multitude of problems majority related to her cervical issues.  Will refer to physical therapy for the neck.  Certainly should help a lot of the pain that she is experiencing.  As far as  her tinnitus conservative discussed managing tinnitus with avoidance of caffeine salt dark chocolate introducing white noise which can be programmed into hearing aids throughout the day.  In regard to her reflux certainly taking pantoprazole before supper will be much more than tedious as well as avoidance of heavy greasy fried meals late meals are all discussed.    Radha should follow up as needed.  If she continues to have symptoms especially related to her imbalance.          Micha Hough MD           Again, thank you for allowing me to participate in the care of your patient.        Sincerely,        Micha Hough MD, MD

## 2024-02-13 RX ORDER — ALPRAZOLAM 0.5 MG
TABLET ORAL
Qty: 60 TABLET | Refills: 0 | Status: SHIPPED | OUTPATIENT
Start: 2024-02-13 | End: 2024-03-12

## 2024-02-13 ASSESSMENT — PAIN SCALES - PAIN ENJOYMENT GENERAL ACTIVITY SCALE (PEG)
PEG_TOTALSCORE: 9
INTERFERED_ENJOYMENT_LIFE: 10
INTERFERED_ENJOYMENT_LIFE: 10 - COMPLETELY INTERFERES
INTERFERED_GENERAL_ACTIVITY: 10
INTERFERED_GENERAL_ACTIVITY: 10 - COMPLETELY INTERFERES
AVG_PAIN_PASTWEEK: 7

## 2024-02-13 NOTE — TELEPHONE ENCOUNTER
Spoke with patient who has already rescheduled for 2/14/2024.     Reema Gaffney RN  [facility]  [phone]

## 2024-02-14 ENCOUNTER — VIRTUAL VISIT (OUTPATIENT)
Dept: PALLIATIVE MEDICINE | Facility: CLINIC | Age: 49
End: 2024-02-14
Payer: COMMERCIAL

## 2024-02-14 DIAGNOSIS — M54.10 RADICULAR PAIN OF UPPER EXTREMITY: ICD-10-CM

## 2024-02-14 DIAGNOSIS — M54.2 CHRONIC NECK PAIN: Primary | ICD-10-CM

## 2024-02-14 DIAGNOSIS — G89.29 CHRONIC BILATERAL THORACIC BACK PAIN: ICD-10-CM

## 2024-02-14 DIAGNOSIS — M54.42 CHRONIC BILATERAL LOW BACK PAIN WITH BILATERAL SCIATICA: ICD-10-CM

## 2024-02-14 DIAGNOSIS — M54.6 CHRONIC BILATERAL THORACIC BACK PAIN: ICD-10-CM

## 2024-02-14 DIAGNOSIS — M53.3 PAIN OF BOTH SACROILIAC JOINTS: ICD-10-CM

## 2024-02-14 DIAGNOSIS — M54.16 LUMBAR RADICULOPATHY: ICD-10-CM

## 2024-02-14 DIAGNOSIS — M54.41 CHRONIC BILATERAL LOW BACK PAIN WITH BILATERAL SCIATICA: ICD-10-CM

## 2024-02-14 DIAGNOSIS — G89.29 CHRONIC NECK PAIN: Primary | ICD-10-CM

## 2024-02-14 DIAGNOSIS — M54.12 CERVICAL RADICULOPATHY: ICD-10-CM

## 2024-02-14 DIAGNOSIS — G89.29 CHRONIC BILATERAL LOW BACK PAIN WITH BILATERAL SCIATICA: ICD-10-CM

## 2024-02-14 DIAGNOSIS — M79.18 MYOFASCIAL PAIN: ICD-10-CM

## 2024-02-14 PROCEDURE — 99214 OFFICE O/P EST MOD 30 MIN: CPT | Mod: 95

## 2024-02-14 ASSESSMENT — PAIN SCALES - GENERAL: PAINLEVEL: SEVERE PAIN (7)

## 2024-02-14 NOTE — PROGRESS NOTES
Video-Visit Details    Type of service:  Video Visit     Originating Location (pt. Location): Home    Distant Location (provider location):  On-site  Platform used for Video Visit: [facility]     Date of visit: 2/14/2024      Assessment:   Radha Beckwith is a 48 year old female with a past medical history significant for chronic bilateral low back pain, depression, SVT, opioid dependence in remission, anxiety, tobacco use, GERD, restless legs who presents with complaints of neck pain, mid and low back.      Low back pain - Onset of pain within last 2 years. Pain is mostly axial, though she does have intermittent radicular leg symptoms, occasionally extends below the knee. On exam, positive facet loading and positive KATHY, sacral thrust and Yeoman's bilaterally. Positive myofascial TTP, negative SLR and neuro exam WNL. Etiology is likely associated with multiple factors, including underlying degenerative changes of lumbar spine, facet and SIJ mediated components, with prominent overlying myofascial component.   Neck/thoracic back pain - Onset of pain within last 2 years. On exam, positive for myofascial TTP, most notably in lower thoracic paraspinals. Etiology is likely associated with multiple factors, including underlying degenerative changes, with prominent overlying myofascial component.      Assigned to [facility] nursing team.     Visit Diagnoses:  1. Chronic neck pain    2. Cervical radiculopathy    3. Chronic bilateral thoracic back pain    4. Lumbar radiculopathy    5. Chronic bilateral low back pain with bilateral sciatica    6. Myofascial pain    7. Pain of both sacroiliac joints    8. Radicular pain of upper extremity        Plan:  Diagnosis reviewed, treatment option addressed, and risk/benifits discussed.  Self-care instructions given.  I am recommending a multidisciplinary treatment plan to help this patient better manage their pain.      Pain Physical Therapy:   "Currently engaging in PT for low back/SI, recently referred for neck pain as well, rehab focused. May consider pain PT in future, pending outcome with current PT.      Pain Psychologist to address relaxation, behavioral change, coping style, and other factors important to improvement.  NO     Diagnostic Studies:  None      Medication Management:   Duloxetine -  Current dose 60 mg in morning and 30 mg at bedtime. Dosage increase recommended at last visit. She does appreciate some degree of benefit, notes some \"funky\" symptoms since last visit but not sure it is related to duloxetine. Of note, she tapered down on gabapentin in between visits, possible she is having symptoms related to this. Recommend continuing at current dose, then will re-assess benefit and possible side effects at follow up.   Gabapentin - she began self-taper since last visit, current dose 300 mg twice daily. Reports onset of tingling sensations since last visit, possibly related to decreasing gabapentin. Advised to discontinue altogether (reduce to 300 mg daily x 3 days, then stop) and continue to monitor for changes in pain level/intensity of tingling. May consider resuming gabapentin, if symptoms worsen, advised to contact clinic in between visits with any concerns.   Continue baclofen 20 mg twice daily.  She reports small improvement after starting baclofen. No side effects. Will plan to continue current dose for now.  Flexeril - continue 5-10 mg twice daily as needed. She takes in morning and afternoon, appreciates benefit for significant myofascial tightness. No side effects. PCP managing.   Allow 4-6 hours in between all muscle relaxant doses, avoid concurrent alcohol use.   Medical cannabis - [place] for [place] medical [place] program at last visit on 1/3/24. Reports completing registration and had visit with dispensary. Currently using \"Lary\" tablets in morning and evening. Recommend continuing to work with dispensary to trial different " cannabis products.      Potential procedures:   None at this time. She has follow up with spine team (Dr. Rosario) in April.     Other Orders/Referrals:   Recommend using TENS unit a few times throughout the day as needed, particularly when engaging in activities that may increase pain.      Follow up with JEANNIE Schroeder CNP in around 8 weeks     Review of Electronic Chart: Today I have also reviewed available medical information in the patient's medical record at [facility] ([facility]) and Care Everywhere (if available), including relevant provider notes, laboratory work, and imaging.     Jenny Landrum, NAOMI, JEANNIE, AGNP-C  [facility] Pain Management     -------------------------------------------------    Subjective:    Chief complaint:   Chief Complaint   Patient presents with    Pain       Interval history:  Radha Beckwith is a 48 year old female last seen on 1/3/24.  They are a patient of mine seen in follow up.     Recommendations/plan at the last visit included:  Pain Physical Therapy:  She will be starting PT again soon. Continue activity as tolerated at home.      Pain Psychologist to address relaxation, behavioral change, coping style, and other factors important to improvement.  NO     Diagnostic Studies:  None      Medication Management:   Duloxetine - currently takes 60 mg daily. She appreciates benefit to some extent, advised to trial higher dose. Continue 60 mg in morning and start 30 mg at bedtime. Monitor for changes in pain levels/intensity. Updated prescription sent into pharmacy today.   Gabapentin - current dose 600 mg three times daily achieved. Marginal benefit with dosage increase, no major side effects. Will likely taper down/off at next visit, given lack of efficacy. Duloxetine adjustments made today to target increased pain, did not recommend multiple changes at the same time.   Continue baclofen 20 mg twice daily.  She reports small improvement after starting baclofen. Will plan  "to continue current dose for now.   Flexeril - continue 5-10 mg twice daily as needed. She takes in morning and afternoon, appreciates benefit for significant myofascial tightness.   Allow 4-6 hours in between all muscle relaxant doses, avoid concurrent alcohol use.   Medical cannabis - [place] for [place] medical [place] program today. Advised on lack of FDA approval/regulation, cannabis products not covered by insurance. Recommend frequent follow up with the dispensary over the next several months to explore different products, until she finds products that help.      Potential procedures:   Defer to neurosurgery - she saw Dr. Arteaga for neck pain, recently referred to Dr. Rosario for non-surgical tx options for low back pain.      Other Orders/Referrals:   Recommend using TENS unit a few times throughout the day as needed, particularly when engaging in activities that may increase pain.      Follow up with JEANNIE Schroeder CNP in 6 weeks     Since her last visit, Radha Beckwith reports:  -Recommended duloxetine increase at last visit, 60 mg in morning and 30 mg in evening. She thinks she is appreciating some degree of benefit, but she is having different \"funky\" symptoms coming up with med adjustments. She does not think these \"funky\" symptoms are necessarily related to duloxetine, but in general.   -She reports having \"buzzing/tingling\" sensation for about 10 minutes after stopping TENS unit, also having some shooting pains in back that are new.   -Certified for medical cannabis at last visit, reports she has been to dispensary and is taking \"Lary\" pills in morning and evening. She appreciates benefit.   -She went to ENT on Monday for tinnitus and buzzing in face. She states that this provider thinks this is more so related to cervicalgia.   -She is currently engaging in pain PT 2 x week, notes ENT referred her for PT focusing on neck.   -She is working on sliding scale disability, wants to make sure that her " "chart is updated accordingly.   -She reports right middle finger has been swollen and can't move it, with prominent ringing in her ears, \"knot\" on neck was really sore, states she felt like \"blood was coming back into body.\"   -She states that the tingling and burning sensation in face, states this is new for her.   -She has been self tapering down on gabapentin, started around the time duloxetine was increased. We had discussed holding off on making too many changes all at once, she researched how to taper off. She has not appreciated much difference in pain with tapering down on gabapentin. Current dose is 300 in AM and 300 mg at bedtime. She states she has been at this dose for 2 weeks. She is open to taper down and then monitor carefully for 2 weeks.     Pain Information:   Pain rating: averages 7/10 on a 0-10 scale.      Interval history from last visit on 1/3/24:  -Her pain has not improved since last visit. She notes worsening neck pain and had ED visit on 11/13/23.   -Repeat SIJ injections on 11/2/23 with Dr. Reynaga. She reports injection was not helpful.   -Gabapentin increased at last visit, she reports marginal benefit. Will likely taper off at next visit, pending outcome with duloxetine increase.   -Discussed medical cannabis at last visit. She states she tried OTC cannabis product from smoke shop that had delta 9, did not like the psychoactive effects and no pain benefit. However, she is open to explore [place] program, which we discussed is more controlled and targeting pain.   -She states she is restarting PT with new provider. She has been rescheduled numerous times with different people recently, which has been frustrating.   -she is dealing with some other health concerns right now, states there was finding in her ear on recent imaging.   -She follows with [facility], Dr. Arteaga, recently referred to Dr. Rosario for nonsurgical tx of low back pain. Will defer tx recs for low back to NS at this time.   Pain " "Information:              Pain rating: averages 7/10 on a 0-10 scale.        Interval history from last visit on 10/4/23:  -She has been struggling with pain since last visit.  -She had ED visit prior to and following last visit.   -She reports second ED visit on 9/12/23 was a horrible experience due to pain and treatment received.   -She continues to have neck and headache pain.  -She has wrist pain, particularly with typing, and extends into hand.   -She had EMG done since last visit that was negative for CTS.   -She is very frustrated without having answer to why she is having significantly increased pain.   -She is having posterior neck pain with radiation into posterior head.   -She reports having blurred vision, headache pain alternatives sides.   -She states the side of her head \"feels funky,\" somewhat similar to Bell's palsy she had in the past.   -She was started on gabapentin 300 mg TID at last visit, feels like pain has improved slightly, particularly when she is at rest.   -She does notice some daytime sedation since starting gabapentin but otherwise no side effects.   -She is open to increasing gabapentin dosage.   -She is also potentially interested in medical cannabis.   Pain Information:              Pain rating: averages 8/10 on a 0-10 scale.        Interval history from last visit on 9/7/23:  -her pain is worse than it was at last visit.   -She reports neck pain has worsened, also having \"new\" pain in mid back that is constant.   -She does not notice any change in pain levels with bending/twisting.   -She had ED visit yesterday, largely unremarkable workup.   -She reports neck pain has been worsening since July when she saw pain PT.   -She continues to work with pain PT with [facility], last visit 9/5/23.   -She states she was doing some of the PT exercises wrong and was finally corrected at this last pain PT visit.   -She reports she went to chiropractor last week, had massage and adjustment, " "no change in pain levels.   -She is having pain radiating into BUE,   -Cervical CT yesterday demonstrated mild C4-5 discogenic pathology.  -She reports having ringing in ears since June.   -She states she is keeping diary of pain symptoms.   -She is open to trial gabapentin.   -She tried Lyrica in the past that caused weird dreams.   Pain Information:              Pain rating: averages 8/10 on a 0-10 scale.        Interval history from last visit on 7/13/23:  -her pain is about the same as it was at last visit in neck, low back is improved since SI joint to an extent.   -She was referred to pain PT at last visit, has been seeing [facility].   -She states she is \"extremely sensitive,\" so touching is limited during visits.   -She is doing some neck stretching.   -She has not appreciated profound difference from pain PT yet.   -She had SI joint injection with Dr. Reynaga on 5/16/23.  -She reports substantial improvement in pain for about a month, then started to have some pain symptoms return.   -She continued to appreciate some benefit from injection for a while longer, less intensity of pain.   -She notes she had migraines x 2 weeks, which she wonders if is attributed to stress of having her son home.   -She also had teeth removal.   -She is taking baclofen 10 mg BID  -She has not appreciated much improvement in neck and low back myofascial pain.   -She continues to take flexeril as well, notes this is helpful when she is very stiff in morning.   -She is open to baclofen increase.   -She understands she needs to space out muscle relaxants doses by 4 hours.   -She is using TENS unit a little bit, will be taking to pain PT.   Pain Information:              Pain rating: averages 7/10 on a 0-10 scale.           HPI from initial visit with me on 4/26/23:  Radha Beckwith is a 47 year old female presents with a chief complaint of neck pain/upper back and radicular low back pain.      The pain has been present for " 1.5-2 years current pain episode.    The pain is Severe Pain (6) in severity.    The pain is described as dull ache in mid back, cramping, sharp, pressure, throbbing, burning in low back, radicular pain into hips, legs, butt, stops above knee.   The pain is alleviated by position changes, massage/heat device, exercise.    It is exacerbated by prolonged sitting, pain is constant but fluctuates with activity, prolonged standing and walking.    Modalities that have been utilized in the past which were helpful include PT.    Things that were not helpful, but tried ,include back brace, LESI x 1, TPI (?), TENS unit (OTC from [employer]).    The patient has never tried pain PT, SI joint/facet.  The patient otherwise denies bowel or bladder incontinence, parasthesias, weakness, saddle anesthesia, unintentional weight loss, or fever/chills/sweats.   Radha MASSEY Amena has been seen at a pain clinic in the past.  She sees addiction med in [facility] with Dr. Ivory.   -She is not currently working due to pain, off work for 1.5 years.   -She looked into disability but was advised by  that they would not take on her case.   -She had L5-S1 JASE 7/2022 with Dr. Ivory in [place].   -She saw neurosurgery in the past as well in 2021.   -She saw pain provider through [facility], did not care for provider.   -She has 1 kid, 25 year old boy, [employer] and .            Current Pain Treatments:     Medications:               Suboxone 2-0.5 mg daily (weaning off)              Duloxetine 60 mg in morning and 30 mg at bedtime               Ropinirole 1 mg at bedtime               Cyclobenzaprine 5-10 mg BID PRN               Baclofen 20 mg BID   Gabapentin 300 mg BID - self tapered since last visit 600 mg TID, recommend tapering completely off      2. Other therapies:               TENS              PT - low back/SI and recent referral for neck pain        Current MME: N/A     Review of [place] Prescription  Monitoring Program (): No concern for abuse or misuse of controlled medications based on this report. Reviewed - appears appropriate.      Past pain treatments:  SI joint injection 5/16/23 -significant benefit x1 month, then diminishing efficacy  Gabapentin     Medications:  Current Outpatient Medications   Medication Sig Dispense Refill    acetaminophen (TYLENOL) 500 MG tablet Take 1,000 mg by mouth every 6 hours as needed for mild pain      ALPRAZolam (XANAX) 0.5 MG tablet TAKE ONE TABLET (0.5MG) BY MOUTH TWICE A DAY 60 tablet 0    baclofen (LIORESAL) 20 MG tablet TAKE ONE TABLET BY MOUTH TWICE A DAY 60 tablet 1    bisacodyl (DULCOLAX) 5 MG EC tablet Take 1 tablet (5 mg) by mouth every other day Take every other day. If no bowel movement for 2 or more days, take 2 tablets of bisacodyl (10 mg) 30 tablet 2    buprenorphine HCl-naloxone HCl (SUBOXONE) 2-0.5 MG per film Place 0.5 Film under the tongue daily       cetirizine (ZYRTEC) 10 MG tablet TAKE ONE TABLET BY MOUTH EVERY MORNING (Patient taking differently: Take 10 mg by mouth daily) 90 tablet 3    cyclobenzaprine (FLEXERIL) 5 MG tablet Take 1-2 tablets (5-10 mg) by mouth 3 times daily as needed for muscle spasms 90 tablet 5    DULoxetine (CYMBALTA) 30 MG capsule Take 60 mg in morning and 30 mg in evening. 270 capsule 0    famotidine (PEPCID) 40 MG tablet TAKE 1 TABLET (40 MG) BY MOUTH 2 TIMES DAILY 60 tablet 1    gabapentin (NEURONTIN) 300 MG capsule Take 2 capsules (600 mg) by mouth 3 times daily CURRENT DOSE IS  1 CAPSULE  THREE TIMES A DAY . INCREASE BY 1 CAPSULE EVERY 3 DAYS UNTIL GOAL DOSE OF  2 CAPSULES BY MOUTH   THREE TIMES A DAY IS  ACHIEVED. 180 capsule 1    lactulose (CHRONULAC) 10 GM/15ML solution Take 15 mLs (10 g) by mouth 3 times daily as needed for constipation (as needed for severe constipaiton, no BM for more than 3 days and feeling constipated) 300 mL 3    ondansetron (ZOFRAN ODT) 4 MG ODT tab DISSOLVE ONE TABLET ON TONGUE EVERY SIX HOURS  AS NEEDED FOR NAUSEA 20 tablet 1    pantoprazole (PROTONIX) 40 MG EC tablet TAKE ONE TABLET BY MOUTH TWICE A DAY WITH MEALS 60 tablet 5    polyethylene glycol (MIRALAX) 17 GM/Dose powder Take 17 g by mouth daily 578 g 3    rOPINIRole (REQUIP) 1 MG tablet TAKE ONE TABLET BY MOUTH EVERY NIGHT AT BEDTIME 90 tablet 0    simvastatin (ZOCOR) 10 MG tablet TAKE ONE TABLET BY MOUTH EVERY NIGHT AT BEDTIME 90 tablet 0    zolpidem (AMBIEN) 5 MG tablet TAKE ONE TABLET (5 MG) BY MOUTH NIGHTLY AS NEEDED FOR SLEEP 30 tablet 1    traZODone (DESYREL) 50 MG tablet Take 1-2 tablets ( mg) by mouth nightly as needed for sleep 120 tablet 1       Medical History: any changes in medical history since they were last seen? No      Objective:    Physical Exam:  not currently breastfeeding.  GENERAL: alert and no distress  EYES: Eyes grossly normal to inspection.  No discharge or erythema, or obvious scleral/conjunctival abnormalities.  RESP: No audible wheeze, cough, or visible cyanosis.    SKIN: Visible skin clear. No significant rash, abnormal pigmentation or lesions.  NEURO: Cranial nerves grossly intact.  Mentation and speech appropriate for age.  PSYCH: Appropriate affect, tone, and pace of words      Diagnostic Tests/Imaging/Labs:  BMP 1/5/24 - GFR >90    BILLING TIME DOCUMENTATION:   The total TIME spent on this patient on the date of the encounter/appointment was 35 minutes.      TOTAL TIME includes:   Time spent preparing to see the patient (reviewing records and tests)   Time spent face to face (or over the phone) with the patient   Time spent ordering tests, medications, procedures and referrals   Time spent Referring and communicating with other healthcare professionals   Time spent documenting clinical information in Epic

## 2024-02-14 NOTE — PROGRESS NOTES
Is Pt currently in [place]? Yes    NOTE:  If Pt is not in [place], Appointment needs to be canceled and rescheduled.  Linette is a 48 year old who is being evaluated via a billable video visit.      How would you like to obtain your AVS? MyChart  If the video visit is dropped, the invitation should be resent by: Text to cell phone: [phone]  Will anyone else be joining your video visit? Meredith Mills MA

## 2024-02-15 ENCOUNTER — THERAPY VISIT (OUTPATIENT)
Dept: PHYSICAL THERAPY | Facility: CLINIC | Age: 49
End: 2024-02-15
Attending: PREVENTIVE MEDICINE
Payer: COMMERCIAL

## 2024-02-15 ENCOUNTER — VIRTUAL VISIT (OUTPATIENT)
Dept: PSYCHOLOGY | Facility: CLINIC | Age: 49
End: 2024-02-15
Payer: COMMERCIAL

## 2024-02-15 DIAGNOSIS — M99.05 SOMATIC DYSFUNCTION OF PELVIC REGION: Primary | ICD-10-CM

## 2024-02-15 DIAGNOSIS — M54.42 CHRONIC LOW BACK PAIN WITH BILATERAL SCIATICA, UNSPECIFIED BACK PAIN LATERALITY: ICD-10-CM

## 2024-02-15 DIAGNOSIS — M79.18 MYOFASCIAL PAIN: ICD-10-CM

## 2024-02-15 DIAGNOSIS — G89.29 CHRONIC LOW BACK PAIN WITH BILATERAL SCIATICA, UNSPECIFIED BACK PAIN LATERALITY: ICD-10-CM

## 2024-02-15 DIAGNOSIS — M54.41 CHRONIC LOW BACK PAIN WITH BILATERAL SCIATICA, UNSPECIFIED BACK PAIN LATERALITY: ICD-10-CM

## 2024-02-15 DIAGNOSIS — F33.1 MODERATE EPISODE OF RECURRENT MAJOR DEPRESSIVE DISORDER (H): Primary | ICD-10-CM

## 2024-02-15 DIAGNOSIS — F41.1 GENERALIZED ANXIETY DISORDER: ICD-10-CM

## 2024-02-15 PROCEDURE — 90837 PSYTX W PT 60 MINUTES: CPT | Mod: 95 | Performed by: COUNSELOR

## 2024-02-15 PROCEDURE — 97113 AQUATIC THERAPY/EXERCISES: CPT | Mod: GP | Performed by: PHYSICAL THERAPIST

## 2024-02-15 NOTE — PROGRESS NOTES
Answers submitted by the patient for this visit:  Patient Health Questionnaire (Submitted on 2/15/2024)  If you checked off any problems, how difficult have these problems made it for you to do your work, take care of things at home, or get along with other people?: Extremely difficult  PHQ9 TOTAL SCORE: 22    Answers submitted by the patient for this visit:  BO-7 (Submitted on 2/5/2024)  BO 7 TOTAL SCORE: 21  Answers submitted by the patient for this visit:  BO-7 (Submitted on 1/18/2024)  BO 7 TOTAL SCORE: 20        [facility]                                     Progress Note    Patient Name: Radha Beckwith  Date: 2/15/24         Service Type: Individual      Session Start Time: 3:00pm Session End Time: 4:00pm     Session Length: 60 mins    Session #: 25    Attendees: Client    Service Modality:  Video Visit:      Provider verified identity through the following two step process.  Patient provided:  Patient is known previously to provider    Telemedicine Visit: The patient's condition can be safely assessed and treated via synchronous audio and visual telemedicine encounter.      Reason for Telemedicine Visit: Patient convenience (e.g. access to timely appointments / distance to available provider)    Originating Site (Patient Location): Patient's home    Distant Site (Provider Location): Provider Remote Setting- Home Office    Consent:  The patient/guardian has verbally consented to: the potential risks and benefits of telemedicine (video visit) versus in person care; bill my insurance or make self-payment for services provided; and responsibility for payment of non-covered services.     Patient would like the video invitation sent by:  My Chart    Mode of Communication:  [facility] Conference [facility]    Distant Location (Provider):  On-site    As the provider I attest to compliance with applicable laws and regulations related to telemedicine.    DATA  Interactive Complexity: No  Crisis:  No        Progress Since Last Session (Related to Symptoms / Goals / Homework):   Symptoms: No change .    Homework: Completed in session      Episode of Care Goals: Minimal progress - ACTION (Actively working towards change); Intervened by reinforcing change plan / affirming steps taken     Current / Ongoing Stressors and Concerns:   The client stated she saw the ENT on Monday.   Pool therapy going really well. States it's really helping with pain reduction.   Has a phone appointment on March 11th with social security.   Working on weaning off of her suboxone.  Weaning off of her gabapentin and noticing  less sweets cravings.        Treatment Objective(s) Addressed in This Session:   Increase interest, engagement, and pleasure in doing things  Feel less tired and more energy during the day        Intervention:   Validated the client's recent experience with her doctor with her recent scope. Talked about improvements that she's noticing with the pool therapy and weaning off of certain medications.  Educated the client about EMDR and also talked about specific therapist for chronic pain. Provided the client with a few resources for these things.    Talk more about EMDR next week. ([facility] virtual reality).     Assessments completed prior to visit:  The following assessments were completed by patient for this visit:  PHQ9:       12/14/2023    10:47 AM 12/18/2023    11:01 AM 1/4/2024     2:01 PM 1/10/2024     6:40 PM 1/18/2024     2:56 PM 1/31/2024    11:58 PM 2/15/2024     1:02 AM   PHQ-9 SCORE   PHQ-9 Total Score MyChart  19 (Moderately severe depression) 20 (Severe depression) 22 (Severe depression) 23 (Severe depression) 18 (Moderately severe depression) 22 (Severe depression)   PHQ-9 Total Score 20 19 20 22 23 18 22     GAD7:       10/4/2023    12:32 PM 10/30/2023    12:23 PM 11/19/2023     9:32 AM 12/10/2023     4:50 AM 1/2/2024    10:50 AM 1/18/2024     2:56 PM 2/5/2024     6:27 PM   BO-7 SCORE   Total  Score 19 (severe anxiety) 18 (severe anxiety) 16 (severe anxiety) 21 (severe anxiety) 20 (severe anxiety) 20 (severe anxiety) 21 (severe anxiety)   Total Score 19 18    18 16 21 20 20    20 21     PROMIS 10-Global Health (all questions and answers displayed):       2/7/2023    12:57 PM 4/17/2023     2:12 PM 6/25/2023     2:45 PM 8/10/2023    12:50 PM 11/10/2023     9:20 PM 11/19/2023     9:34 AM 11/26/2023    10:43 PM   PROMIS 10   In general, would you say your health is:  Poor Poor Fair Fair Poor Fair   In general, would you say your quality of life is:  Poor Fair Poor Fair Poor Poor   In general, how would you rate your physical health?  Poor Poor Poor Fair Poor Poor   In general, how would you rate your mental health, including your mood and your ability to think?  Fair Fair Poor Fair Poor Poor   In general, how would you rate your satisfaction with your social activities and relationships?  Fair Poor Fair Fair Poor Poor   In general, please rate how well you carry out your usual social activities and roles  Fair Poor Poor Fair Poor Fair   To what extent are you able to carry out your everyday physical activities such as walking, climbing stairs, carrying groceries, or moving a chair?  A little Moderately A little Moderately Mostly A little   In the past 7 days, how often have you been bothered by emotional problems such as feeling anxious, depressed, or irritable?  Always Always Always Often Always Always   In the past 7 days, how would you rate your fatigue on average?  Moderate Severe Severe Severe Severe Severe   In the past 7 days, how would you rate your pain on average, where 0 means no pain, and 10 means worst imaginable pain?  8 7 7 7 7 7   In general, would you say your health is:  1 1 2 2 1 2   In general, would you say your quality of life is:  1 2 1 2 1 1   In general, how would you rate your physical health?  1 1 1 2 1 1   In general, how would you rate your mental health, including your mood and  your ability to think?  2 2 1 2 1 1   In general, how would you rate your satisfaction with your social activities and relationships?  2 1 2 2 1 1   In general, please rate how well you carry out your usual social activities and roles. (This includes activities at home, at work and in your community, and responsibilities as a parent, child, spouse, employee, friend, etc.)  2 1 1 2 1 2   To what extent are you able to carry out your everyday physical activities such as walking, climbing stairs, carrying groceries, or moving a chair?  2 3 2 3 4 2   In the past 7 days, how often have you been bothered by emotional problems such as feeling anxious, depressed, or irritable?  5 5 5 4 5 5   In the past 7 days, how would you rate your fatigue on average?  3 4 4 4 4 4   In the past 7 days, how would you rate your pain on average, where 0 means no pain, and 10 means worst imaginable pain?  8 7 7 7 7 7   Global Mental Health Score  6    6 6 5 8 4 4   Global Physical Health Score  8    8 8 7 9 9 7   PROMIS TOTAL - SUBSCORES  14    14 14 12 17 13 11       Information is confidential and restricted. Go to Review Flowsheets to unlock data.    Multiple values from one day are sorted in reverse-chronological order     Howard Suicide Severity Rating Scale (Lifetime/Recent)      10/4/2022    11:00 AM   Howard Suicide Severity Rating (Lifetime/Recent)   Q1 Wished to be Dead (Past Month) no   Q2 Suicidal Thoughts (Past Month) no   Q3 Suicidal Thought Method no   Q4 Suicidal Intent without Specific Plan no   Q5 Suicide Intent with Specific Plan yes   Q6 Suicide Behavior (Lifetime) yes   Within the Past 3 Months? no   Level of Risk per Screen high risk         ASSESSMENT: Current Emotional / Mental Status (status of significant symptoms):   Risk status (Self / Other harm or suicidal ideation)   Patient denies current fears or concerns for personal safety.   Patient denies current or recent suicidal ideation or behaviors.   Patient  denies current or recent homicidal ideation or behaviors.   Patient denies current or recent self injurious behavior or ideation.   Patient denies other safety concerns.   Patient reports there has been no change in risk factors since their last session.     Patient reports there has been no change in protective factors since their last session.     Recommended that patient call 911 or go to the local ED should there be a change in any of these risk factors.     Appearance:   Appropriate    Eye Contact:   Good    Psychomotor Behavior: Normal    Attitude:   Cooperative    Orientation:   All   Speech    Rate / Production: Normal/ Responsive Normal     Volume:  Normal    Mood:    Irritable  Sad    Affect:    Appropriate    Thought Content:  Clear    Thought Form:  Coherent  Logical    Insight:    Good      Medication Review:   No changes to current psychiatric medication(s)     Medication Compliance:   Yes     Changes in Health Issues:   None reported     Chemical Use Review:   Substance Use: Chemical use reviewed, no active concerns identified      Tobacco Use: No change in amount of tobacco use since last session.  Patient declined discussion at this time    Diagnosis:  1. Moderate episode of recurrent major depressive disorder (H)    2. Generalized anxiety disorder          Collateral Reports Completed:   Not Applicable    PLAN: (Patient Tasks / Therapist Tasks / Other)  Continue to work on stress reduction skills and self care.         Ricarda Bhatia [facility]                                                         ______________________________________________________________________    Individual Treatment Plan    Patient's Name: Radha Beckwith  YOB: 1975    Date of Creation: 6/28/23  Date Treatment Plan Last Reviewed/Revised: 2/15/24    DSM5 Diagnoses: 296.32 (F33.1) Major Depressive Disorder, Recurrent Episode, Moderate _  Psychosocial / Contextual Factors: living with boyfriend, memory  issues  PROMIS (reviewed every 90 days):     Referral / Collaboration:  Referral to another professional/service is not indicated at this time..    Anticipated number of session for this episode of care: 9-12 sessions  Anticipation frequency of session:  as needed  Anticipated Duration of each session: 38-52 minutes  Treatment plan will be reviewed in 90 days or when goals have been changed.       MeasurableTreatment Goal(s) related to diagnosis / functional impairment(s)  Goal 1: Patient will increase communication skills with family members.    Objective #A (Patient Action)    Patient will learn & utilize at least 2 assertive communication skills weekly.  Status: Continued - Date(s): 2/15/24    Intervention(s)  Therapist will teach emotional regulation skills. distress tolerance skills, interpersonal effectiveness skills, emotion regluation skills, mindfulness skills, radical acceptance. Therapist will teach client how to ID body cues for anxiety, anxiety reduction techniques, how to ID triggers for depression and anxiety- decrease reactivity/eliminate, lifestyle changes to reduce depression and anxiety, communication skills, explore cognitive beliefs and help client to develop healthy cognitive patterns and beliefs.    Objective #B  Patient will use thought-stopping strategy daily to reduce intrusive thoughts.  Status: Continued - Date(s): 2/15/24    Intervention(s)  Therapist will teach emotional regulation skills. distress tolerance skills, interpersonal effectiveness skills, emotion regluation skills, mindfulness skills, radical acceptance. Therapist will teach client how to ID body cues for anxiety, anxiety reduction techniques, how to ID triggers for depression and anxiety- decrease reactivity/eliminate, lifestyle changes to reduce depression and anxiety, communication skills, explore cognitive beliefs and help client to develop healthy cognitive patterns and beliefs.      Goal 2: Patient will decrease  depression symptoms.      Objective #A (Patient Action)    Status: Continued - Date(s): 2/15/24    Patient will Increase interest, engagement, and pleasure in doing things  Decrease frequency and intensity of feeling down, depressed, hopeless  Improve quantity and quality of night time sleep / decrease daytime naps  Feel less tired and more energy during the day   Improve diet, appetite, mindful eating, and / or meal planning  Identify negative self-talk and behaviors: challenge core beliefs, myths, and actions  Improve concentration, focus, and mindfulness in daily activities   Feel less fidgety, restless or slow in daily activities / interpersonal interactions.    Intervention(s)  Therapist will teach emotional regulation skills. distress tolerance skills, interpersonal effectiveness skills, emotion regluation skills, mindfulness skills, radical acceptance. Therapist will teach client how to ID body cues for anxiety, anxiety reduction techniques, how to ID triggers for depression and anxiety- decrease reactivity/eliminate, lifestyle changes to reduce depression and anxiety, communication skills, explore cognitive beliefs and help client to develop healthy cognitive patterns and beliefs.    Objective #B  Patient will identify three distraction and diversion activities and use those activities to decrease level of anxiety  .    Status: Continued - Date(s):  2/15/24    Intervention(s)  Therapist will teach emotional regulation skills. distress tolerance skills, interpersonal effectiveness skills, emotion regluation skills, mindfulness skills, radical acceptance. Therapist will teach client how to ID body cues for anxiety, anxiety reduction techniques, how to ID triggers for depression and anxiety- decrease reactivity/eliminate, lifestyle changes to reduce depression and anxiety, communication skills, explore cognitive beliefs and help client to develop healthy cognitive patterns and beliefs.      Patient has reviewed  and agreed to the above plan.      Ricarda Bhatia, [facility]

## 2024-02-19 ASSESSMENT — ANXIETY QUESTIONNAIRES
GAD7 TOTAL SCORE: 19
1. FEELING NERVOUS, ANXIOUS, OR ON EDGE: NEARLY EVERY DAY
8. IF YOU CHECKED OFF ANY PROBLEMS, HOW DIFFICULT HAVE THESE MADE IT FOR YOU TO DO YOUR WORK, TAKE CARE OF THINGS AT HOME, OR GET ALONG WITH OTHER PEOPLE?: EXTREMELY DIFFICULT
GAD7 TOTAL SCORE: 19
IF YOU CHECKED OFF ANY PROBLEMS ON THIS QUESTIONNAIRE, HOW DIFFICULT HAVE THESE PROBLEMS MADE IT FOR YOU TO DO YOUR WORK, TAKE CARE OF THINGS AT HOME, OR GET ALONG WITH OTHER PEOPLE: EXTREMELY DIFFICULT
2. NOT BEING ABLE TO STOP OR CONTROL WORRYING: NEARLY EVERY DAY
GAD7 TOTAL SCORE: 19
7. FEELING AFRAID AS IF SOMETHING AWFUL MIGHT HAPPEN: NEARLY EVERY DAY
3. WORRYING TOO MUCH ABOUT DIFFERENT THINGS: NEARLY EVERY DAY
5. BEING SO RESTLESS THAT IT IS HARD TO SIT STILL: NEARLY EVERY DAY
7. FEELING AFRAID AS IF SOMETHING AWFUL MIGHT HAPPEN: NEARLY EVERY DAY
6. BECOMING EASILY ANNOYED OR IRRITABLE: SEVERAL DAYS
4. TROUBLE RELAXING: NEARLY EVERY DAY

## 2024-02-19 NOTE — PATIENT INSTRUCTIONS
"Pain Physical Therapy:  Currently engaging in PT for low back/SI, recently referred for neck pain as well, rehab focused. May consider pain PT in future, pending outcome with current PT.      Pain Psychologist to address relaxation, behavioral change, coping style, and other factors important to improvement.  NO     Diagnostic Studies:  None      Medication Management:   Duloxetine -  Current dose 60 mg in morning and 30 mg at bedtime. Dosage increase recommended at last visit. She does appreciate some degree of benefit, notes some \"funky\" symptoms since last visit but not sure it is related to duloxetine. Of note, she tapered down on gabapentin in between visits, possible she is having symptoms related to this. Recommend continuing at current dose, then will re-assess benefit and possible side effects at follow up.   Gabapentin - she began self-taper since last visit, current dose 300 mg twice daily. Reports onset of tingling sensations since last visit, possibly related to decreasing gabapentin. Advised to discontinue altogether (reduce to 300 mg daily x 3 days, then stop) and continue to monitor for changes in pain level/intensity of tingling. May consider resuming gabapentin, if symptoms worsen, advised to contact clinic in between visits with any concerns.   Continue baclofen 20 mg twice daily.  She reports small improvement after starting baclofen. No side effects. Will plan to continue current dose for now.  Flexeril - continue 5-10 mg twice daily as needed. She takes in morning and afternoon, appreciates benefit for significant myofascial tightness. No side effects. PCP managing.   Allow 4-6 hours in between all muscle relaxant doses, avoid concurrent alcohol use.   Medical cannabis - certified for [place] medical cannabis program at last visit on 1/3/24. Reports completing registration and had visit with dispensary. Currently using \"Lary\" tablets in morning and evening. Recommend continuing to work with " dispensary to trial different cannabis products.      Potential procedures:   None at this time. She has follow up with spine team (Dr. Rosario) in April.     Other Orders/Referrals:   Recommend using TENS unit a few times throughout the day as needed, particularly when engaging in activities that may increase pain.      Follow up with JEANNIE Schroeder CNP in around 8 weeks

## 2024-02-20 ENCOUNTER — THERAPY VISIT (OUTPATIENT)
Dept: PHYSICAL THERAPY | Facility: CLINIC | Age: 49
End: 2024-02-20
Attending: PREVENTIVE MEDICINE
Payer: COMMERCIAL

## 2024-02-20 DIAGNOSIS — M54.2 CHRONIC NECK PAIN: ICD-10-CM

## 2024-02-20 DIAGNOSIS — M79.18 MYOFASCIAL PAIN: ICD-10-CM

## 2024-02-20 DIAGNOSIS — G89.29 CHRONIC NECK PAIN: ICD-10-CM

## 2024-02-20 DIAGNOSIS — M54.16 LUMBAR RADICULOPATHY: Primary | ICD-10-CM

## 2024-02-20 DIAGNOSIS — M99.05 SOMATIC DYSFUNCTION OF PELVIC REGION: ICD-10-CM

## 2024-02-20 PROCEDURE — 97113 AQUATIC THERAPY/EXERCISES: CPT | Mod: GP | Performed by: PHYSICAL THERAPIST

## 2024-02-20 RX ORDER — CYCLOBENZAPRINE HCL 5 MG
5-10 TABLET ORAL 3 TIMES DAILY PRN
Qty: 90 TABLET | Refills: 5 | Status: SHIPPED | OUTPATIENT
Start: 2024-02-20 | End: 2024-08-22

## 2024-02-22 ENCOUNTER — VIRTUAL VISIT (OUTPATIENT)
Dept: PSYCHOLOGY | Facility: CLINIC | Age: 49
End: 2024-02-22
Payer: COMMERCIAL

## 2024-02-22 DIAGNOSIS — F33.1 MODERATE EPISODE OF RECURRENT MAJOR DEPRESSIVE DISORDER (H): Primary | ICD-10-CM

## 2024-02-22 DIAGNOSIS — F41.1 GENERALIZED ANXIETY DISORDER: ICD-10-CM

## 2024-02-22 PROCEDURE — 90837 PSYTX W PT 60 MINUTES: CPT | Mod: 95 | Performed by: COUNSELOR

## 2024-02-22 NOTE — PROGRESS NOTES
Answers submitted by the patient for this visit:  Patient Health Questionnaire (Submitted on 2/22/2024)  If you checked off any problems, how difficult have these problems made it for you to do your work, take care of things at home, or get along with other people?: Extremely difficult  PHQ9 TOTAL SCORE: 22  BO-7 (Submitted on 2/19/2024)  BO 7 TOTAL SCORE: 19        [facility]                                     Progress Note    Patient Name: Radha Beckwith  Date: 2/22/24         Service Type: Individual      Session Start Time: 3:06pm Session End Time: 4:00pm     Session Length: 54 mins    Session #: 26    Attendees: Client    Service Modality:  Video Visit:      Provider verified identity through the following two step process.  Patient provided:  Patient is known previously to provider    Telemedicine Visit: The patient's condition can be safely assessed and treated via synchronous audio and visual telemedicine encounter.      Reason for Telemedicine Visit: Patient convenience (e.g. access to timely appointments / distance to available provider)    Originating Site (Patient Location): Patient's home    Distant Site (Provider Location): Provider Remote Setting- Home Office    Consent:  The patient/guardian has verbally consented to: the potential risks and benefits of telemedicine (video visit) versus in person care; bill my insurance or make self-payment for services provided; and responsibility for payment of non-covered services.     Patient would like the video invitation sent by:  My Chart    Mode of Communication:  Video Conference via [facility]well    Distant Location (Provider):  On-site    As the provider I attest to compliance with applicable laws and regulations related to telemedicine.    DATA  Interactive Complexity: No  Crisis: No        Progress Since Last Session (Related to Symptoms / Goals / Homework):   Symptoms: No change .    Homework: Completed in session      Episode of  Care Goals: Minimal progress - ACTION (Actively working towards change); Intervened by reinforcing change plan / affirming steps taken     Current / Ongoing Stressors and Concerns:   The client stated pool therapy is helping a lot and feels like she's learning a lot form the therapist.   Feels like she has a lot to do with her boyfriend depression- thinks he's jealous that she's home all day and he wants to be home, that her weight gain plays a role.        Treatment Objective(s) Addressed in This Session:   Increase interest, engagement, and pleasure in doing things  Feel less tired and more energy during the day        Intervention:   Worked with the client on cognitive reframing skills. Talked about how she feels her boyfriends depression is her fault and how this is a distortion/thinking error. Worked through negative self esteem themes as well and communication skills with her boyfriend.         Assessments completed prior to visit:  The following assessments were completed by patient for this visit:  PHQ9:       12/18/2023    11:01 AM 1/4/2024     2:01 PM 1/10/2024     6:40 PM 1/18/2024     2:56 PM 1/31/2024    11:58 PM 2/15/2024     1:02 AM 2/22/2024     2:26 PM   PHQ-9 SCORE   PHQ-9 Total Score MyChart 19 (Moderately severe depression) 20 (Severe depression) 22 (Severe depression) 23 (Severe depression) 18 (Moderately severe depression) 22 (Severe depression) 22 (Severe depression)   PHQ-9 Total Score 19 20 22 23 18 22 22     GAD7:       10/30/2023    12:23 PM 11/19/2023     9:32 AM 12/10/2023     4:50 AM 1/2/2024    10:50 AM 1/18/2024     2:56 PM 2/5/2024     6:27 PM 2/19/2024     3:39 PM   BO-7 SCORE   Total Score 18 (severe anxiety) 16 (severe anxiety) 21 (severe anxiety) 20 (severe anxiety) 20 (severe anxiety) 21 (severe anxiety) 19 (severe anxiety)   Total Score 18    18 16 21 20 20    20 21 19     PROMIS 10-Global Health (all questions and answers displayed):       2/7/2023    12:57 PM 4/17/2023      2:12 PM 6/25/2023     2:45 PM 8/10/2023    12:50 PM 11/10/2023     9:20 PM 11/19/2023     9:34 AM 11/26/2023    10:43 PM   PROMIS 10   In general, would you say your health is:  Poor Poor Fair Fair Poor Fair   In general, would you say your quality of life is:  Poor Fair Poor Fair Poor Poor   In general, how would you rate your physical health?  Poor Poor Poor Fair Poor Poor   In general, how would you rate your mental health, including your mood and your ability to think?  Fair Fair Poor Fair Poor Poor   In general, how would you rate your satisfaction with your social activities and relationships?  Fair Poor Fair Fair Poor Poor   In general, please rate how well you carry out your usual social activities and roles  Fair Poor Poor Fair Poor Fair   To what extent are you able to carry out your everyday physical activities such as walking, climbing stairs, carrying groceries, or moving a chair?  A little Moderately A little Moderately Mostly A little   In the past 7 days, how often have you been bothered by emotional problems such as feeling anxious, depressed, or irritable?  Always Always Always Often Always Always   In the past 7 days, how would you rate your fatigue on average?  Moderate Severe Severe Severe Severe Severe   In the past 7 days, how would you rate your pain on average, where 0 means no pain, and 10 means worst imaginable pain?  8 7 7 7 7 7   In general, would you say your health is:  1 1 2 2 1 2   In general, would you say your quality of life is:  1 2 1 2 1 1   In general, how would you rate your physical health?  1 1 1 2 1 1   In general, how would you rate your mental health, including your mood and your ability to think?  2 2 1 2 1 1   In general, how would you rate your satisfaction with your social activities and relationships?  2 1 2 2 1 1   In general, please rate how well you carry out your usual social activities and roles. (This includes activities at home, at work and in your community,  and responsibilities as a parent, child, spouse, employee, friend, etc.)  2 1 1 2 1 2   To what extent are you able to carry out your everyday physical activities such as walking, climbing stairs, carrying groceries, or moving a chair?  2 3 2 3 4 2   In the past 7 days, how often have you been bothered by emotional problems such as feeling anxious, depressed, or irritable?  5 5 5 4 5 5   In the past 7 days, how would you rate your fatigue on average?  3 4 4 4 4 4   In the past 7 days, how would you rate your pain on average, where 0 means no pain, and 10 means worst imaginable pain?  8 7 7 7 7 7   Global Mental Health Score  6    6 6 5 8 4 4   Global Physical Health Score  8    8 8 7 9 9 7   PROMIS TOTAL - SUBSCORES  14    14 14 12 17 13 11       Information is confidential and restricted. Go to Review Flowsheets to unlock data.    Multiple values from one day are sorted in reverse-chronological order     [place] Suicide Severity Rating Scale (Lifetime/Recent)      10/4/2022    11:00 AM   [place] Suicide Severity Rating (Lifetime/Recent)   Q1 Wished to be Dead (Past Month) no   Q2 Suicidal Thoughts (Past Month) no   Q3 Suicidal Thought Method no   Q4 Suicidal Intent without Specific Plan no   Q5 Suicide Intent with Specific Plan yes   Q6 Suicide Behavior (Lifetime) yes   Within the Past 3 Months? no   Level of Risk per Screen high risk         ASSESSMENT: Current Emotional / Mental Status (status of significant symptoms):   Risk status (Self / Other harm or suicidal ideation)   Patient denies current fears or concerns for personal safety.   Patient denies current or recent suicidal ideation or behaviors.   Patient denies current or recent homicidal ideation or behaviors.   Patient denies current or recent self injurious behavior or ideation.   Patient denies other safety concerns.   Patient reports there has been no change in risk factors since their last session.     Patient reports there has been no change in  protective factors since their last session.     Recommended that patient call 911 or go to the local ED should there be a change in any of these risk factors.     Appearance:   Appropriate    Eye Contact:   Good    Psychomotor Behavior: Normal    Attitude:   Cooperative    Orientation:   All   Speech    Rate / Production: Normal/ Responsive Normal     Volume:  Normal    Mood:    Normal   Affect:    Appropriate    Thought Content:  Clear    Thought Form:  Coherent  Logical    Insight:    Good      Medication Review:   No changes to current psychiatric medication(s)     Medication Compliance:   Yes     Changes in Health Issues:   None reported     Chemical Use Review:   Substance Use: Chemical use reviewed, no active concerns identified      Tobacco Use: No change in amount of tobacco use since last session.  Patient declined discussion at this time    Diagnosis:  1. Moderate episode of recurrent major depressive disorder (H)    2. Generalized anxiety disorder          Collateral Reports Completed:   Not Applicable    PLAN: (Patient Tasks / Therapist Tasks / Other)  Continue to work on stress reduction skills and self care and cognitive reframing. .         Ricarda Bhatia, [facility]                                                         ______________________________________________________________________    Individual Treatment Plan    Patient's Name: Radha Beckwith  YOB: 1975    Date of Creation: 6/28/23  Date Treatment Plan Last Reviewed/Revised: 2/15/24    DSM5 Diagnoses: 296.32 (F33.1) Major Depressive Disorder, Recurrent Episode, Moderate _  Psychosocial / Contextual Factors: living with boyfriend, memory issues  PROMIS (reviewed every 90 days):     Referral / Collaboration:  Referral to another professional/service is not indicated at this time..    Anticipated number of session for this episode of care: 9-12 sessions  Anticipation frequency of session:  as needed  Anticipated Duration of  each session: 38-52 minutes  Treatment plan will be reviewed in 90 days or when goals have been changed.       MeasurableTreatment Goal(s) related to diagnosis / functional impairment(s)  Goal 1: Patient will increase communication skills with family members.    Objective #A (Patient Action)    Patient will learn & utilize at least 2 assertive communication skills weekly.  Status: Continued - Date(s): 2/15/24    Intervention(s)  Therapist will teach emotional regulation skills. distress tolerance skills, interpersonal effectiveness skills, emotion regluation skills, mindfulness skills, radical acceptance. Therapist will teach client how to ID body cues for anxiety, anxiety reduction techniques, how to ID triggers for depression and anxiety- decrease reactivity/eliminate, lifestyle changes to reduce depression and anxiety, communication skills, explore cognitive beliefs and help client to develop healthy cognitive patterns and beliefs.    Objective #B  Patient will use thought-stopping strategy daily to reduce intrusive thoughts.  Status: Continued - Date(s): 2/15/24    Intervention(s)  Therapist will teach emotional regulation skills. distress tolerance skills, interpersonal effectiveness skills, emotion regluation skills, mindfulness skills, radical acceptance. Therapist will teach client how to ID body cues for anxiety, anxiety reduction techniques, how to ID triggers for depression and anxiety- decrease reactivity/eliminate, lifestyle changes to reduce depression and anxiety, communication skills, explore cognitive beliefs and help client to develop healthy cognitive patterns and beliefs.      Goal 2: Patient will decrease depression symptoms.      Objective #A (Patient Action)    Status: Continued - Date(s): 2/15/24    Patient will Increase interest, engagement, and pleasure in doing things  Decrease frequency and intensity of feeling down, depressed, hopeless  Improve quantity and quality of night time sleep /  decrease daytime naps  Feel less tired and more energy during the day   Improve diet, appetite, mindful eating, and / or meal planning  Identify negative self-talk and behaviors: challenge core beliefs, myths, and actions  Improve concentration, focus, and mindfulness in daily activities   Feel less fidgety, restless or slow in daily activities / interpersonal interactions.    Intervention(s)  Therapist will teach emotional regulation skills. distress tolerance skills, interpersonal effectiveness skills, emotion regluation skills, mindfulness skills, radical acceptance. Therapist will teach client how to ID body cues for anxiety, anxiety reduction techniques, how to ID triggers for depression and anxiety- decrease reactivity/eliminate, lifestyle changes to reduce depression and anxiety, communication skills, explore cognitive beliefs and help client to develop healthy cognitive patterns and beliefs.    Objective #B  Patient will identify three distraction and diversion activities and use those activities to decrease level of anxiety  .    Status: Continued - Date(s):  2/15/24    Intervention(s)  Therapist will teach emotional regulation skills. distress tolerance skills, interpersonal effectiveness skills, emotion regluation skills, mindfulness skills, radical acceptance. Therapist will teach client how to ID body cues for anxiety, anxiety reduction techniques, how to ID triggers for depression and anxiety- decrease reactivity/eliminate, lifestyle changes to reduce depression and anxiety, communication skills, explore cognitive beliefs and help client to develop healthy cognitive patterns and beliefs.      Patient has reviewed and agreed to the above plan.      ERIK Keller

## 2024-02-26 ENCOUNTER — THERAPY VISIT (OUTPATIENT)
Dept: PHYSICAL THERAPY | Facility: CLINIC | Age: 49
End: 2024-02-26
Attending: PREVENTIVE MEDICINE
Payer: COMMERCIAL

## 2024-02-26 DIAGNOSIS — M54.50 CHRONIC BILATERAL LOW BACK PAIN WITHOUT SCIATICA: ICD-10-CM

## 2024-02-26 DIAGNOSIS — M99.05 SOMATIC DYSFUNCTION OF PELVIC REGION: Primary | ICD-10-CM

## 2024-02-26 DIAGNOSIS — M79.18 MYOFASCIAL PAIN: ICD-10-CM

## 2024-02-26 DIAGNOSIS — G89.29 CHRONIC BILATERAL LOW BACK PAIN WITHOUT SCIATICA: ICD-10-CM

## 2024-02-26 PROCEDURE — 97530 THERAPEUTIC ACTIVITIES: CPT | Mod: GP | Performed by: PHYSICAL THERAPIST

## 2024-02-26 PROCEDURE — 97110 THERAPEUTIC EXERCISES: CPT | Mod: GP | Performed by: PHYSICAL THERAPIST

## 2024-02-29 ENCOUNTER — VIRTUAL VISIT (OUTPATIENT)
Dept: PSYCHOLOGY | Facility: CLINIC | Age: 49
End: 2024-02-29
Payer: COMMERCIAL

## 2024-02-29 DIAGNOSIS — F41.1 GENERALIZED ANXIETY DISORDER: ICD-10-CM

## 2024-02-29 DIAGNOSIS — F33.1 MODERATE EPISODE OF RECURRENT MAJOR DEPRESSIVE DISORDER (H): Primary | ICD-10-CM

## 2024-02-29 PROCEDURE — 90837 PSYTX W PT 60 MINUTES: CPT | Mod: 95 | Performed by: COUNSELOR

## 2024-02-29 ASSESSMENT — PATIENT HEALTH QUESTIONNAIRE - PHQ9
SUM OF ALL RESPONSES TO PHQ QUESTIONS 1-9: 21
10. IF YOU CHECKED OFF ANY PROBLEMS, HOW DIFFICULT HAVE THESE PROBLEMS MADE IT FOR YOU TO DO YOUR WORK, TAKE CARE OF THINGS AT HOME, OR GET ALONG WITH OTHER PEOPLE: EXTREMELY DIFFICULT
SUM OF ALL RESPONSES TO PHQ QUESTIONS 1-9: 21

## 2024-02-29 NOTE — PROGRESS NOTES
Answers submitted by the patient for this visit:  Patient Health Questionnaire (Submitted on 2/29/2024)  If you checked off any problems, how difficult have these problems made it for you to do your work, take care of things at home, or get along with other people?: Extremely difficult  PHQ9 TOTAL SCORE: 21          [facility]                                     Progress Note    Patient Name: Radha Beckwith  Date: 2/29/24         Service Type: Individual      Session Start Time: 3:05pm Session End Time: 4:00pm     Session Length: 55 mins    Session #: 27    Attendees: Client    Service Modality:  Video Visit:      Provider verified identity through the following two step process.  Patient provided:  Patient is known previously to provider    Telemedicine Visit: The patient's condition can be safely assessed and treated via synchronous audio and visual telemedicine encounter.      Reason for Telemedicine Visit: Patient convenience (e.g. access to timely appointments / distance to available provider)    Originating Site (Patient Location): Patient's home    Distant Site (Provider Location): Provider Remote Setting- Home Office    Consent:  The patient/guardian has verbally consented to: the potential risks and benefits of telemedicine (video visit) versus in person care; bill my insurance or make self-payment for services provided; and responsibility for payment of non-covered services.     Patient would like the video invitation sent by:  My Chart    Mode of Communication:  Video Conference via [employer]    Distant Location (Provider):  On-site    As the provider I attest to compliance with applicable laws and regulations related to telemedicine.    DATA  Interactive Complexity: No  Crisis: No        Progress Since Last Session (Related to Symptoms / Goals / Homework):   Symptoms: No change .    Homework: Completed in session      Episode of Care Goals: Minimal progress - ACTION (Actively working  towards change); Intervened by reinforcing change plan / affirming steps taken     Current / Ongoing Stressors and Concerns:   The client stated she's so tired today. Stated she's been having a lot of issues with her memory and distraction lately.        Treatment Objective(s) Addressed in This Session:   Increase interest, engagement, and pleasure in doing things  Feel less tired and more energy during the day        Intervention:   Talked with the client about her struggles with organization, writing notes to her self and losing them, and other attention issues. Discussed if she would consider getting ADHD testing or talking with her doctor about possibly trying a small dose of a stimulant to help with attention issues.          Assessments completed prior to visit:  The following assessments were completed by patient for this visit:  PHQ9:       1/4/2024     2:01 PM 1/10/2024     6:40 PM 1/18/2024     2:56 PM 1/31/2024    11:58 PM 2/15/2024     1:02 AM 2/22/2024     2:26 PM 2/29/2024     1:46 PM   PHQ-9 SCORE   PHQ-9 Total Score MyChart 20 (Severe depression) 22 (Severe depression) 23 (Severe depression) 18 (Moderately severe depression) 22 (Severe depression) 22 (Severe depression) 21 (Severe depression)   PHQ-9 Total Score 20 22 23 18 22 22 21     GAD7:       10/30/2023    12:23 PM 11/19/2023     9:32 AM 12/10/2023     4:50 AM 1/2/2024    10:50 AM 1/18/2024     2:56 PM 2/5/2024     6:27 PM 2/19/2024     3:39 PM   BO-7 SCORE   Total Score 18 (severe anxiety) 16 (severe anxiety) 21 (severe anxiety) 20 (severe anxiety) 20 (severe anxiety) 21 (severe anxiety) 19 (severe anxiety)   Total Score 18    18 16 21 20 20    20 21 19     PROMIS 10-Global Health (all questions and answers displayed):       4/17/2023     2:12 PM 6/25/2023     2:45 PM 8/10/2023    12:50 PM 11/10/2023     9:20 PM 11/19/2023     9:34 AM 11/26/2023    10:43 PM 2/27/2024     6:40 PM   PROMIS 10   In general, would you say your health is: Poor  Poor Fair Fair Poor Fair Fair   In general, would you say your quality of life is: Poor Fair Poor Fair Poor Poor Fair   In general, how would you rate your physical health? Poor Poor Poor Fair Poor Poor Poor   In general, how would you rate your mental health, including your mood and your ability to think? Fair Fair Poor Fair Poor Poor Fair   In general, how would you rate your satisfaction with your social activities and relationships? Fair Poor Fair Fair Poor Poor Poor   In general, please rate how well you carry out your usual social activities and roles Fair Poor Poor Fair Poor Fair Poor   To what extent are you able to carry out your everyday physical activities such as walking, climbing stairs, carrying groceries, or moving a chair? A little Moderately A little Moderately Mostly A little A little   In the past 7 days, how often have you been bothered by emotional problems such as feeling anxious, depressed, or irritable? Always Always Always Often Always Always Always   In the past 7 days, how would you rate your fatigue on average? Moderate Severe Severe Severe Severe Severe Severe   In the past 7 days, how would you rate your pain on average, where 0 means no pain, and 10 means worst imaginable pain? 8 7 7 7 7 7 7   In general, would you say your health is: 1 1 2 2 1 2 2   In general, would you say your quality of life is: 1 2 1 2 1 1 2   In general, how would you rate your physical health? 1 1 1 2 1 1 1   In general, how would you rate your mental health, including your mood and your ability to think? 2 2 1 2 1 1 2   In general, how would you rate your satisfaction with your social activities and relationships? 2 1 2 2 1 1 1   In general, please rate how well you carry out your usual social activities and roles. (This includes activities at home, at work and in your community, and responsibilities as a parent, child, spouse, employee, friend, etc.) 2 1 1 2 1 2 1   To what extent are you able to carry out  your everyday physical activities such as walking, climbing stairs, carrying groceries, or moving a chair? 2 3 2 3 4 2 2   In the past 7 days, how often have you been bothered by emotional problems such as feeling anxious, depressed, or irritable? 5 5 5 4 5 5 5   In the past 7 days, how would you rate your fatigue on average? 3 4 4 4 4 4 4   In the past 7 days, how would you rate your pain on average, where 0 means no pain, and 10 means worst imaginable pain? 8 7 7 7 7 7 7   Global Mental Health Score 6    6 6 5 8 4 4 6   Global Physical Health Score 8    8 8 7 9 9 7 7   PROMIS TOTAL - SUBSCORES 14    14 14 12 17 13 11 13     [place] Suicide Severity Rating Scale (Lifetime/Recent)      10/4/2022    11:00 AM   [place] Suicide Severity Rating (Lifetime/Recent)   Q1 Wished to be Dead (Past Month) no   Q2 Suicidal Thoughts (Past Month) no   Q3 Suicidal Thought Method no   Q4 Suicidal Intent without Specific Plan no   Q5 Suicide Intent with Specific Plan yes   Q6 Suicide Behavior (Lifetime) yes   Within the Past 3 Months? no   Level of Risk per Screen high risk         ASSESSMENT: Current Emotional / Mental Status (status of significant symptoms):   Risk status (Self / Other harm or suicidal ideation)   Patient denies current fears or concerns for personal safety.   Patient denies current or recent suicidal ideation or behaviors.   Patient denies current or recent homicidal ideation or behaviors.   Patient denies current or recent self injurious behavior or ideation.   Patient denies other safety concerns.   Patient reports there has been no change in risk factors since their last session.     Patient reports there has been no change in protective factors since their last session.     Recommended that patient call 911 or go to the local ED should there be a change in any of these risk factors.     Appearance:   Appropriate    Eye Contact:   Good    Psychomotor Behavior: Normal    Attitude:   Cooperative     Orientation:   All   Speech    Rate / Production: Normal/ Responsive Normal     Volume:  Normal    Mood:    Normal   Affect:    Appropriate    Thought Content:  Clear    Thought Form:  Coherent  Logical    Insight:    Good      Medication Review:   No changes to current psychiatric medication(s)     Medication Compliance:   Yes     Changes in Health Issues:   None reported     Chemical Use Review:   Substance Use: Chemical use reviewed, no active concerns identified      Tobacco Use: No change in amount of tobacco use since last session.  Patient declined discussion at this time    Diagnosis:  1. Moderate episode of recurrent major depressive disorder (H)    2. Generalized anxiety disorder          Collateral Reports Completed:   Not Applicable    PLAN: (Patient Tasks / Therapist Tasks / Other)  Continue to work on stress reduction skills and self care and cognitive reframing. Client will talk to her doctor about ADHD.          Ricarda Bhatia, [facility]                                                         ______________________________________________________________________    Individual Treatment Plan    Patient's Name: Radha Beckwith  YOB: 1975    Date of Creation: 6/28/23  Date Treatment Plan Last Reviewed/Revised: 2/15/24    DSM5 Diagnoses: 296.32 (F33.1) Major Depressive Disorder, Recurrent Episode, Moderate _  Psychosocial / Contextual Factors: living with boyfriend, memory issues  PROMIS (reviewed every 90 days):     Referral / Collaboration:  Referral to another professional/service is not indicated at this time..    Anticipated number of session for this episode of care: 9-12 sessions  Anticipation frequency of session:  as needed  Anticipated Duration of each session: 38-52 minutes  Treatment plan will be reviewed in 90 days or when goals have been changed.       MeasurableTreatment Goal(s) related to diagnosis / functional impairment(s)  Goal 1: Patient will increase communication  skills with family members.    Objective #A (Patient Action)    Patient will learn & utilize at least 2 assertive communication skills weekly.  Status: Continued - Date(s): 2/15/24    Intervention(s)  Therapist will teach emotional regulation skills. distress tolerance skills, interpersonal effectiveness skills, emotion regluation skills, mindfulness skills, radical acceptance. Therapist will teach client how to ID body cues for anxiety, anxiety reduction techniques, how to ID triggers for depression and anxiety- decrease reactivity/eliminate, lifestyle changes to reduce depression and anxiety, communication skills, explore cognitive beliefs and help client to develop healthy cognitive patterns and beliefs.    Objective #B  Patient will use thought-stopping strategy daily to reduce intrusive thoughts.  Status: Continued - Date(s): 2/15/24    Intervention(s)  Therapist will teach emotional regulation skills. distress tolerance skills, interpersonal effectiveness skills, emotion regluation skills, mindfulness skills, radical acceptance. Therapist will teach client how to ID body cues for anxiety, anxiety reduction techniques, how to ID triggers for depression and anxiety- decrease reactivity/eliminate, lifestyle changes to reduce depression and anxiety, communication skills, explore cognitive beliefs and help client to develop healthy cognitive patterns and beliefs.      Goal 2: Patient will decrease depression symptoms.      Objective #A (Patient Action)    Status: Continued - Date(s): 2/15/24    Patient will Increase interest, engagement, and pleasure in doing things  Decrease frequency and intensity of feeling down, depressed, hopeless  Improve quantity and quality of night time sleep / decrease daytime naps  Feel less tired and more energy during the day   Improve diet, appetite, mindful eating, and / or meal planning  Identify negative self-talk and behaviors: challenge core beliefs, myths, and actions  Improve  concentration, focus, and mindfulness in daily activities   Feel less fidgety, restless or slow in daily activities / interpersonal interactions.    Intervention(s)  Therapist will teach emotional regulation skills. distress tolerance skills, interpersonal effectiveness skills, emotion regluation skills, mindfulness skills, radical acceptance. Therapist will teach client how to ID body cues for anxiety, anxiety reduction techniques, how to ID triggers for depression and anxiety- decrease reactivity/eliminate, lifestyle changes to reduce depression and anxiety, communication skills, explore cognitive beliefs and help client to develop healthy cognitive patterns and beliefs.    Objective #B  Patient will identify three distraction and diversion activities and use those activities to decrease level of anxiety  .    Status: Continued - Date(s):  2/15/24    Intervention(s)  Therapist will teach emotional regulation skills. distress tolerance skills, interpersonal effectiveness skills, emotion regluation skills, mindfulness skills, radical acceptance. Therapist will teach client how to ID body cues for anxiety, anxiety reduction techniques, how to ID triggers for depression and anxiety- decrease reactivity/eliminate, lifestyle changes to reduce depression and anxiety, communication skills, explore cognitive beliefs and help client to develop healthy cognitive patterns and beliefs.      Patient has reviewed and agreed to the above plan.      Ricarda Bhatia [facility]

## 2024-03-06 ENCOUNTER — THERAPY VISIT (OUTPATIENT)
Dept: PHYSICAL THERAPY | Facility: CLINIC | Age: 49
End: 2024-03-06
Attending: PREVENTIVE MEDICINE
Payer: COMMERCIAL

## 2024-03-06 DIAGNOSIS — M79.18 MYOFASCIAL PAIN: Primary | ICD-10-CM

## 2024-03-06 DIAGNOSIS — M54.50 CHRONIC BILATERAL LOW BACK PAIN WITHOUT SCIATICA: ICD-10-CM

## 2024-03-06 DIAGNOSIS — M99.05 SOMATIC DYSFUNCTION OF PELVIC REGION: ICD-10-CM

## 2024-03-06 DIAGNOSIS — G89.29 CHRONIC BILATERAL LOW BACK PAIN WITHOUT SCIATICA: ICD-10-CM

## 2024-03-06 PROCEDURE — 97113 AQUATIC THERAPY/EXERCISES: CPT | Mod: GP | Performed by: PHYSICAL THERAPIST

## 2024-03-06 NOTE — PROGRESS NOTES
[facility]                                                                                   OUTPATIENT PHYSICAL THERAPY    PLAN OF TREATMENT FOR OUTPATIENT REHABILITATION   Patient's Last Name, First Name, Radha Ruiz YOB: 1975   Provider's Name   [facility]   Medical Record No.  [id number]     Onset Date: 10/01/20 (Approximately  and has had chronic pain for years)  Start of Care Date: 01/10/24     Medical Diagnosis:  Somatic dysfunction of pelvis region (M99.05)  - Primary    Chronic bilateral low back pain with bilateral sciatica (M54.42, M54.41, G89.29)    Myofascial pain (M79.18)      PT Treatment Diagnosis:  Somatic dysfunction of pelvis region (M99.05) - Primary Chronic bilateral low back pain with bilateral sciatica (M54.42, M54.41, G89.29) Myofascial pain (M79.18) Plan of Treatment  Frequency/Duration: 2x/week/ 8 weeks    Certification date from 03/07/24 to 05/01/24         See note for plan of treatment details and functional goals     Edith Mills, PT                         I CERTIFY THE NEED FOR THESE SERVICES FURNISHED UNDER        THIS PLAN OF TREATMENT AND WHILE UNDER MY CARE     (Physician attestation of this document indicates review and certification of the therapy plan).              Referring Provider:  Guille Rosario    Initial Assessment  See Epic Evaluation- Start of Care Date: 01/10/24            PLAN  Continue therapy per current plan of care.    Beginning/End Dates of Progress Note Reporting Period:    1/10/2024 to 03/06/2024    Referring Provider:  Guille Rosario     03/06/24 0500   Appointment Info   Signing clinician's name / credentials Edith Mills, PT, DPt   Visits Used 9/10   Medical Diagnosis Somatic dysfunction of pelvis region (M99.05)  - Primary    Chronic bilateral low back pain with bilateral sciatica (M54.42, M54.41, G89.29)    Myofascial pain (M79.18)   PT Tx Diagnosis Somatic  "dysfunction of pelvis region (M99.05) - Primary Chronic bilateral low back pain with bilateral sciatica (M54.42, M54.41, G89.29) Myofascial pain (M79.18)   Precautions/Limitations Hypersensitive system   Quick Adds Certification   Progress Note/Certification   Start of Care Date 01/10/24   Onset of illness/injury or Date of Surgery 10/01/20  (Approximately  and has had chronic pain for years)   Therapy Frequency 2x/week   Predicted Duration 8 weeks   Certification date from 03/07/24   Certification date to 05/01/24   PT Goal 1   Goal Identifier Exercise tolerance   Goal Description Patient will improve ex tolerance to 30 minutes in pool to allow for ex progression on land   Goal Progress Met, enjoys pool therapy and has tolerated it well   Target Date 03/06/24   Date Met 03/06/24   PT Goal 2   Goal Identifier PJD   Goal Description Patient will demo improved PJD position and alignment as well as tolerance to progressive strengthening HEP for home managament in 8 weeks   Goal Progress Difficulty tolerating Myofascial gluteal protocol and PJD focused exercsies early on, but now in SI belt, and doing some core stabilization work in addition to pool therapy. Suggested a few more weeks in pool and getting some info situated to [facility] pool program independently and we can foucs on some mroe land based techniques to keep getting her further along in her needs. Multifactorial needs, TNE , progressive general ex, relaxation techniques   Target Date 05/01/24   PT Goal 3   Goal Identifier Pain   Goal Description Patient will note 30% reduce back and hips pain for improved standing and walking tolerance in 8 weeks   Goal Progress Variable pain, started SI belt this week, working on integrating stress mgmt and TNE concepts and general pool exercises as been well tolerated   Target Date 05/01/24   Subjective Report   Subjective Report Having some emotional issues with some stressors and had a \"breakdown\" with lots of " crying last night after talking with her son. She does think SI belt so far seems helpful, learning to adjust the belt tension to find the needs of her body, States tolerating her core exercises well, feels it in her core.   Objective Measure 1   Objective Measure Pain   Details 6/10   Objective Measure 2   Objective Measure Pelvis   Details NT in pool today, level last session   Objective Measure 3   Objective Measure Core 2/26/24   Details Fair engagement with observed R sided deficits in control resulting from L LE lifting and L transverse plane trunk rotation, Poor gluteal engagemetn /posterior hip R with this observed, some clicking and sharp pain R SIJ which is improved with HS/Glute activation in hooklying while lifting L side   Aquatic Therapy   Aquatic Therapy Minutes ([id number]) 38   Aquatic Therapy 1 - Details Ladder in/out, current level 1.5. Walking and float warm up x 5 minutes forward and lateral, UE raises. Blue paddle both ends closed UE flexion, abduction, horizontal abduction, Elbow flexion/extension x 1 minute each. Green noodle float/unload spine with deep breaths to relax diaphragm. Kickboard walking with resistance x 2 minutes F/B.   Skilled Intervention Aquatic therapy to improve aerobic tolerance and activity levels.   Patient Response/Progress Patient has been doing well in therapeutic pool and has been able to advance her activity levels in relation to this, we will continue pool another 2 weeks in pool while she pursue her community options,   Plan   Home program MET x 2, walking, posture, deep breathing, stress idenfitication for recovery/, TA ex from 2/26   Updates to plan of care Going to seek possibility for community pool options to continue pool therapy   Plan for next session Pool, land based updates

## 2024-03-12 DIAGNOSIS — G25.81 RESTLESS LEGS SYNDROME (RLS): ICD-10-CM

## 2024-03-12 DIAGNOSIS — F41.9 ANXIETY: ICD-10-CM

## 2024-03-12 DIAGNOSIS — F51.04 PSYCHOPHYSIOLOGICAL INSOMNIA: ICD-10-CM

## 2024-03-12 RX ORDER — ALPRAZOLAM 0.5 MG
TABLET ORAL
Qty: 60 TABLET | Refills: 0 | Status: SHIPPED | OUTPATIENT
Start: 2024-03-12 | End: 2024-03-15

## 2024-03-12 RX ORDER — ZOLPIDEM TARTRATE 5 MG/1
TABLET ORAL
Qty: 30 TABLET | Refills: 1 | Status: SHIPPED | OUTPATIENT
Start: 2024-03-12 | End: 2024-08-19

## 2024-03-12 RX ORDER — ROPINIROLE 1 MG/1
TABLET, FILM COATED ORAL
Qty: 90 TABLET | Refills: 0 | Status: SHIPPED | OUTPATIENT
Start: 2024-03-12 | End: 2024-06-11

## 2024-03-12 ASSESSMENT — ANXIETY QUESTIONNAIRES
7. FEELING AFRAID AS IF SOMETHING AWFUL MIGHT HAPPEN: NEARLY EVERY DAY
1. FEELING NERVOUS, ANXIOUS, OR ON EDGE: NEARLY EVERY DAY
IF YOU CHECKED OFF ANY PROBLEMS ON THIS QUESTIONNAIRE, HOW DIFFICULT HAVE THESE PROBLEMS MADE IT FOR YOU TO DO YOUR WORK, TAKE CARE OF THINGS AT HOME, OR GET ALONG WITH OTHER PEOPLE: EXTREMELY DIFFICULT
3. WORRYING TOO MUCH ABOUT DIFFERENT THINGS: NEARLY EVERY DAY
GAD7 TOTAL SCORE: 20
6. BECOMING EASILY ANNOYED OR IRRITABLE: MORE THAN HALF THE DAYS
GAD7 TOTAL SCORE: 20
2. NOT BEING ABLE TO STOP OR CONTROL WORRYING: NEARLY EVERY DAY
7. FEELING AFRAID AS IF SOMETHING AWFUL MIGHT HAPPEN: NEARLY EVERY DAY
4. TROUBLE RELAXING: NEARLY EVERY DAY
GAD7 TOTAL SCORE: 20
5. BEING SO RESTLESS THAT IT IS HARD TO SIT STILL: NEARLY EVERY DAY
8. IF YOU CHECKED OFF ANY PROBLEMS, HOW DIFFICULT HAVE THESE MADE IT FOR YOU TO DO YOUR WORK, TAKE CARE OF THINGS AT HOME, OR GET ALONG WITH OTHER PEOPLE?: EXTREMELY DIFFICULT

## 2024-03-14 ENCOUNTER — VIRTUAL VISIT (OUTPATIENT)
Dept: PSYCHOLOGY | Facility: CLINIC | Age: 49
End: 2024-03-14
Payer: COMMERCIAL

## 2024-03-14 DIAGNOSIS — F33.1 MODERATE EPISODE OF RECURRENT MAJOR DEPRESSIVE DISORDER (H): Primary | ICD-10-CM

## 2024-03-14 DIAGNOSIS — F41.1 GENERALIZED ANXIETY DISORDER: ICD-10-CM

## 2024-03-14 PROCEDURE — 90837 PSYTX W PT 60 MINUTES: CPT | Mod: 95 | Performed by: COUNSELOR

## 2024-03-14 ASSESSMENT — PATIENT HEALTH QUESTIONNAIRE - PHQ9
SUM OF ALL RESPONSES TO PHQ QUESTIONS 1-9: 24
SUM OF ALL RESPONSES TO PHQ QUESTIONS 1-9: 24
10. IF YOU CHECKED OFF ANY PROBLEMS, HOW DIFFICULT HAVE THESE PROBLEMS MADE IT FOR YOU TO DO YOUR WORK, TAKE CARE OF THINGS AT HOME, OR GET ALONG WITH OTHER PEOPLE: EXTREMELY DIFFICULT

## 2024-03-14 NOTE — PROGRESS NOTES
Answers submitted by the patient for this visit:  Patient Health Questionnaire (Submitted on 3/14/2024)  If you checked off any problems, how difficult have these problems made it for you to do your work, take care of things at home, or get along with other people?: Extremely difficult  PHQ9 TOTAL SCORE: 24  BO-7 (Submitted on 3/12/2024)  BO 7 TOTAL SCORE: 20          [facility]                                     Progress Note    Patient Name: Radha Beckwith  Date: 3/14/13         Service Type: Individual      Session Start Time: 3:00pm Session End Time: 4:00pm     Session Length: 60    Session #: 28    Attendees: Client    Service Modality:  Video Visit:      Provider verified identity through the following two step process.  Patient provided:  Patient is known previously to provider    Telemedicine Visit: The patient's condition can be safely assessed and treated via synchronous audio and visual telemedicine encounter.      Reason for Telemedicine Visit: Patient convenience (e.g. access to timely appointments / distance to available provider)    Originating Site (Patient Location): Patient's home    Distant Site (Provider Location): Provider Remote Setting- Home Office    Consent:  The patient/guardian has verbally consented to: the potential risks and benefits of telemedicine (video visit) versus in person care; bill my insurance or make self-payment for services provided; and responsibility for payment of non-covered services.     Patient would like the video invitation sent by:  My Chart    Mode of Communication:  Video Conference via [employer]    Distant Location (Provider):  On-site    As the provider I attest to compliance with applicable laws and regulations related to telemedicine.    DATA  Interactive Complexity: No  Crisis: No        Progress Since Last Session (Related to Symptoms / Goals / Homework):   Symptoms: No change .    Homework: Completed in session      Episode of Care  Goals: Minimal progress - ACTION (Actively working towards change); Intervened by reinforcing change plan / affirming steps taken     Current / Ongoing Stressors and Concerns:   The client stated last week was really hard.   Stated the medical marijuana helps her pain but it makes her thinking scattered.   Talked with her son about the things he's currently accomplishing and is really proud. Before she got off the phone with him he made the statement that the best thing that ever happened to him was his grandma dying.      Treatment Objective(s) Addressed in This Session:   Increase interest, engagement, and pleasure in doing things  Feel less tired and more energy during the day        Intervention:   Talked about the comment her son made and how it affected her. Processed through what it triggered for her on a deeper level Worked through internal conflicts that came up. Talked through some of the struggles she continues to have with feeling scattered        Assessments completed prior to visit:  The following assessments were completed by patient for this visit:  PHQ9:       1/10/2024     6:40 PM 1/18/2024     2:56 PM 1/31/2024    11:58 PM 2/15/2024     1:02 AM 2/22/2024     2:26 PM 2/29/2024     1:46 PM 3/14/2024     2:53 PM   PHQ-9 SCORE   PHQ-9 Total Score MyChart 22 (Severe depression) 23 (Severe depression) 18 (Moderately severe depression) 22 (Severe depression) 22 (Severe depression) 21 (Severe depression) 24 (Severe depression)   PHQ-9 Total Score 22 23 18 22 22 21 24    24     GAD7:       11/19/2023     9:32 AM 12/10/2023     4:50 AM 1/2/2024    10:50 AM 1/18/2024     2:56 PM 2/5/2024     6:27 PM 2/19/2024     3:39 PM 3/12/2024     8:32 PM   BO-7 SCORE   Total Score 16 (severe anxiety) 21 (severe anxiety) 20 (severe anxiety) 20 (severe anxiety) 21 (severe anxiety) 19 (severe anxiety) 20 (severe anxiety)   Total Score 16 21 20 20    20 21 19 20     PROMIS 10-Global Health (all questions and answers  displayed):       6/25/2023     2:45 PM 8/10/2023    12:50 PM 11/10/2023     9:20 PM 11/19/2023     9:34 AM 11/26/2023    10:43 PM 2/27/2024     6:40 PM 3/12/2024     8:35 PM   PROMIS 10   In general, would you say your health is: Poor Fair Fair Poor Fair Fair Poor   In general, would you say your quality of life is: Fair Poor Fair Poor Poor Fair Poor   In general, how would you rate your physical health? Poor Poor Fair Poor Poor Poor Poor   In general, how would you rate your mental health, including your mood and your ability to think? Fair Poor Fair Poor Poor Fair Poor   In general, how would you rate your satisfaction with your social activities and relationships? Poor Fair Fair Poor Poor Poor Poor   In general, please rate how well you carry out your usual social activities and roles Poor Poor Fair Poor Fair Poor Poor   To what extent are you able to carry out your everyday physical activities such as walking, climbing stairs, carrying groceries, or moving a chair? Moderately A little Moderately Mostly A little A little A little   In the past 7 days, how often have you been bothered by emotional problems such as feeling anxious, depressed, or irritable? Always Always Often Always Always Always Always   In the past 7 days, how would you rate your fatigue on average? Severe Severe Severe Severe Severe Severe Severe   In the past 7 days, how would you rate your pain on average, where 0 means no pain, and 10 means worst imaginable pain? 7 7 7 7 7 7 8   In general, would you say your health is: 1 2 2 1 2 2 1   In general, would you say your quality of life is: 2 1 2 1 1 2 1   In general, how would you rate your physical health? 1 1 2 1 1 1 1   In general, how would you rate your mental health, including your mood and your ability to think? 2 1 2 1 1 2 1   In general, how would you rate your satisfaction with your social activities and relationships? 1 2 2 1 1 1 1   In general, please rate how well you carry out your  usual social activities and roles. (This includes activities at home, at work and in your community, and responsibilities as a parent, child, spouse, employee, friend, etc.) 1 1 2 1 2 1 1   To what extent are you able to carry out your everyday physical activities such as walking, climbing stairs, carrying groceries, or moving a chair? 3 2 3 4 2 2 2   In the past 7 days, how often have you been bothered by emotional problems such as feeling anxious, depressed, or irritable? 5 5 4 5 5 5 5   In the past 7 days, how would you rate your fatigue on average? 4 4 4 4 4 4 4   In the past 7 days, how would you rate your pain on average, where 0 means no pain, and 10 means worst imaginable pain? 7 7 7 7 7 7 8   Global Mental Health Score 6 5 8 4 4 6 4   Global Physical Health Score 8 7 9 9 7 7 7   PROMIS TOTAL - SUBSCORES 14 12 17 13 11 13 11     [place] Suicide Severity Rating Scale (Lifetime/Recent)      10/4/2022    11:00 AM   [place] Suicide Severity Rating (Lifetime/Recent)   Q1 Wished to be Dead (Past Month) no   Q2 Suicidal Thoughts (Past Month) no   Q3 Suicidal Thought Method no   Q4 Suicidal Intent without Specific Plan no   Q5 Suicide Intent with Specific Plan yes   Q6 Suicide Behavior (Lifetime) yes   Within the Past 3 Months? no   Level of Risk per Screen high risk         ASSESSMENT: Current Emotional / Mental Status (status of significant symptoms):   Risk status (Self / Other harm or suicidal ideation)   Patient denies current fears or concerns for personal safety.   Patient denies current or recent suicidal ideation or behaviors.   Patient denies current or recent homicidal ideation or behaviors.   Patient denies current or recent self injurious behavior or ideation.   Patient denies other safety concerns.   Patient reports there has been no change in risk factors since their last session.     Patient reports there has been no change in protective factors since their last session.     Recommended that  patient call 911 or go to the local ED should there be a change in any of these risk factors.     Appearance:   Appropriate    Eye Contact:   Good    Psychomotor Behavior: Normal    Attitude:   Cooperative    Orientation:   All   Speech    Rate / Production: Normal/ Responsive Normal     Volume:  Normal    Mood:    Normal   Affect:    Appropriate    Thought Content:  Clear    Thought Form:  Coherent  Logical    Insight:    Good      Medication Review:   No changes to current psychiatric medication(s)     Medication Compliance:   Yes     Changes in Health Issues:   None reported     Chemical Use Review:   Substance Use: Chemical use reviewed, no active concerns identified      Tobacco Use: No change in amount of tobacco use since last session.  Patient declined discussion at this time    Diagnosis:  1. Moderate episode of recurrent major depressive disorder (H)    2. Generalized anxiety disorder          Collateral Reports Completed:   Not Applicable    PLAN: (Patient Tasks / Therapist Tasks / Other)  Continue to work on self care skills.        Ricarda Bhatia [facility]                                                         ______________________________________________________________________    Individual Treatment Plan    Patient's Name: Radha Beckwith  YOB: 1975    Date of Creation: 6/28/23  Date Treatment Plan Last Reviewed/Revised: 2/15/24    DSM5 Diagnoses: 296.32 (F33.1) Major Depressive Disorder, Recurrent Episode, Moderate _  Psychosocial / Contextual Factors: living with boyfriend, memory issues  PROMIS (reviewed every 90 days):     Referral / Collaboration:  Referral to another professional/service is not indicated at this time..    Anticipated number of session for this episode of care: 9-12 sessions  Anticipation frequency of session:  as needed  Anticipated Duration of each session: 38-52 minutes  Treatment plan will be reviewed in 90 days or when goals have been changed.        MeasurableTreatment Goal(s) related to diagnosis / functional impairment(s)  Goal 1: Patient will increase communication skills with family members.    Objective #A (Patient Action)    Patient will learn & utilize at least 2 assertive communication skills weekly.  Status: Continued - Date(s): 2/15/24    Intervention(s)  Therapist will teach emotional regulation skills. distress tolerance skills, interpersonal effectiveness skills, emotion regluation skills, mindfulness skills, radical acceptance. Therapist will teach client how to ID body cues for anxiety, anxiety reduction techniques, how to ID triggers for depression and anxiety- decrease reactivity/eliminate, lifestyle changes to reduce depression and anxiety, communication skills, explore cognitive beliefs and help client to develop healthy cognitive patterns and beliefs.    Objective #B  Patient will use thought-stopping strategy daily to reduce intrusive thoughts.  Status: Continued - Date(s): 2/15/24    Intervention(s)  Therapist will teach emotional regulation skills. distress tolerance skills, interpersonal effectiveness skills, emotion regluation skills, mindfulness skills, radical acceptance. Therapist will teach client how to ID body cues for anxiety, anxiety reduction techniques, how to ID triggers for depression and anxiety- decrease reactivity/eliminate, lifestyle changes to reduce depression and anxiety, communication skills, explore cognitive beliefs and help client to develop healthy cognitive patterns and beliefs.      Goal 2: Patient will decrease depression symptoms.      Objective #A (Patient Action)    Status: Continued - Date(s): 2/15/24    Patient will Increase interest, engagement, and pleasure in doing things  Decrease frequency and intensity of feeling down, depressed, hopeless  Improve quantity and quality of night time sleep / decrease daytime naps  Feel less tired and more energy during the day   Improve diet, appetite, mindful  eating, and / or meal planning  Identify negative self-talk and behaviors: challenge core beliefs, myths, and actions  Improve concentration, focus, and mindfulness in daily activities   Feel less fidgety, restless or slow in daily activities / interpersonal interactions.    Intervention(s)  Therapist will teach emotional regulation skills. distress tolerance skills, interpersonal effectiveness skills, emotion regluation skills, mindfulness skills, radical acceptance. Therapist will teach client how to ID body cues for anxiety, anxiety reduction techniques, how to ID triggers for depression and anxiety- decrease reactivity/eliminate, lifestyle changes to reduce depression and anxiety, communication skills, explore cognitive beliefs and help client to develop healthy cognitive patterns and beliefs.    Objective #B  Patient will identify three distraction and diversion activities and use those activities to decrease level of anxiety  .    Status: Continued - Date(s):  2/15/24    Intervention(s)  Therapist will teach emotional regulation skills. distress tolerance skills, interpersonal effectiveness skills, emotion regluation skills, mindfulness skills, radical acceptance. Therapist will teach client how to ID body cues for anxiety, anxiety reduction techniques, how to ID triggers for depression and anxiety- decrease reactivity/eliminate, lifestyle changes to reduce depression and anxiety, communication skills, explore cognitive beliefs and help client to develop healthy cognitive patterns and beliefs.      Patient has reviewed and agreed to the above plan.      Ricarda Bhatia [facility]

## 2024-03-15 ENCOUNTER — VIRTUAL VISIT (OUTPATIENT)
Dept: FAMILY MEDICINE | Facility: CLINIC | Age: 49
End: 2024-03-15
Payer: COMMERCIAL

## 2024-03-15 DIAGNOSIS — F41.1 GENERALIZED ANXIETY DISORDER: Primary | ICD-10-CM

## 2024-03-15 DIAGNOSIS — F11.21 OPIOID DEPENDENCE IN REMISSION (H): ICD-10-CM

## 2024-03-15 DIAGNOSIS — R10.2 VAGINAL PAIN: ICD-10-CM

## 2024-03-15 DIAGNOSIS — R10.2 PELVIC PAIN IN FEMALE: ICD-10-CM

## 2024-03-15 DIAGNOSIS — F32.5 MAJOR DEPRESSION IN REMISSION (H): ICD-10-CM

## 2024-03-15 DIAGNOSIS — Z79.899 MEDICAL CANNABIS USE: ICD-10-CM

## 2024-03-15 PROCEDURE — 99443 PR PHYSICIAN TELEPHONE EVALUATION 21-30 MIN: CPT | Mod: 95 | Performed by: NURSE PRACTITIONER

## 2024-03-15 NOTE — PROGRESS NOTES
Linette is a 48 year old who is being evaluated via a billable video visit.    How would you like to obtain your AVS? MyChart and Mail a copy  If the video visit is dropped, the invitation should be resent by: Text to cell phone: [phone]  Will anyone else be joining your video visit? No      Assessment & Plan     Generalized anxiety disorder  Patient came to me on xanax 1mg BID with her suboxone, ambien, flexeril, gabapentin.  We weaned down to 0.5mg BID of her Xanax.  She has stopped her gabapentin but has now added medical cannabis.  We discussed today that I would no longer prescribe her xanax- she did just fill 3 days ago.  Discussed the weaning process.  The medical cannabis should help with her anxiety.  Discussed adding buspar- she has taken this in the past but does not remember if she had a bad side effect.  Could also consider increasing her cymbalta    Major depression in remission (H24)  In remission- good control    Opioid dependence in remission (H)  On suboxone     Pelvic pain in female  Vaginal pain  She has had a hysterectomy.  We did try a pelvic us but they were unable to see her ovaries due to bowel gas.  She stated when they did the vaginal probe she had a lot of pain and was nearing crying.  Referral GYN  - Ob/Gyn  Referral; Future    Medical cannabis use  As above      38 minutes spent by me on the date of the encounter doing chart review, review of test results, interpretation of tests, patient visit, and documentation             Subjective   Linette is a 48 year old, presenting for the following health issues:  Follow Up      3/15/2024    10:30 AM   Additional Questions   Roomed by Katiana MASSEY         3/15/2024    10:30 AM   Patient Reported Additional Medications   Patient reports taking the following new medications vitamin c, b-complex, vitamin d w/ k, medical cannabis     Video Start Time:     unable to hear me on video so switched to video    HPI                 Objective   "  Vitals - Patient Reported  Pain Score: Severe Pain (7)  Pain Loc: Other - see comment (neck and entire back)        Recently started on medical cannibas/ in addition, cyclobenzaprine cut back- cutting back on xanax, ambien  Has been on suboxone since 2008- has been on lowest dose- \"forever\".  Talked with dr. Myers- cutting in 3/4 and weaning down.      Has been on a lot of meds in the past.  Has a diary of what the side effects are.  Thinks she did not like buspar.  Not sure of wellbutrin- had very vivid dreams.    Counselor      Physical Exam   GENERAL: alert and no distress          "

## 2024-03-20 ENCOUNTER — THERAPY VISIT (OUTPATIENT)
Dept: PHYSICAL THERAPY | Facility: CLINIC | Age: 49
End: 2024-03-20
Attending: PREVENTIVE MEDICINE
Payer: COMMERCIAL

## 2024-03-20 DIAGNOSIS — M53.3 PAIN OF BOTH SACROILIAC JOINTS: ICD-10-CM

## 2024-03-20 DIAGNOSIS — M79.18 MYOFASCIAL PAIN: ICD-10-CM

## 2024-03-20 DIAGNOSIS — M54.16 LUMBAR RADICULOPATHY: Primary | ICD-10-CM

## 2024-03-20 PROCEDURE — 97530 THERAPEUTIC ACTIVITIES: CPT | Mod: GP | Performed by: PHYSICAL THERAPIST

## 2024-03-20 PROCEDURE — 97110 THERAPEUTIC EXERCISES: CPT | Mod: GP | Performed by: PHYSICAL THERAPIST

## 2024-03-22 ENCOUNTER — TRANSFERRED RECORDS (OUTPATIENT)
Dept: HEALTH INFORMATION MANAGEMENT | Facility: CLINIC | Age: 49
End: 2024-03-22

## 2024-03-25 DIAGNOSIS — F41.9 ANXIETY: ICD-10-CM

## 2024-03-25 DIAGNOSIS — R11.0 NAUSEA: ICD-10-CM

## 2024-03-26 RX ORDER — ONDANSETRON 4 MG/1
TABLET, ORALLY DISINTEGRATING ORAL
Qty: 20 TABLET | Refills: 1 | Status: SHIPPED | OUTPATIENT
Start: 2024-03-26 | End: 2024-06-06

## 2024-03-27 ENCOUNTER — THERAPY VISIT (OUTPATIENT)
Dept: PHYSICAL THERAPY | Facility: CLINIC | Age: 49
End: 2024-03-27
Attending: PREVENTIVE MEDICINE
Payer: COMMERCIAL

## 2024-03-27 DIAGNOSIS — M54.6 CHRONIC BILATERAL THORACIC BACK PAIN: ICD-10-CM

## 2024-03-27 DIAGNOSIS — G89.29 CHRONIC BILATERAL THORACIC BACK PAIN: ICD-10-CM

## 2024-03-27 DIAGNOSIS — M54.16 LUMBAR RADICULOPATHY: ICD-10-CM

## 2024-03-27 DIAGNOSIS — M79.18 MYOFASCIAL PAIN: ICD-10-CM

## 2024-03-27 DIAGNOSIS — M53.3 PAIN OF BOTH SACROILIAC JOINTS: Primary | ICD-10-CM

## 2024-03-27 PROCEDURE — 97113 AQUATIC THERAPY/EXERCISES: CPT | Mod: GP | Performed by: PHYSICAL THERAPIST

## 2024-03-27 ASSESSMENT — ANXIETY QUESTIONNAIRES
6. BECOMING EASILY ANNOYED OR IRRITABLE: NEARLY EVERY DAY
7. FEELING AFRAID AS IF SOMETHING AWFUL MIGHT HAPPEN: NEARLY EVERY DAY
7. FEELING AFRAID AS IF SOMETHING AWFUL MIGHT HAPPEN: NEARLY EVERY DAY
GAD7 TOTAL SCORE: 19
3. WORRYING TOO MUCH ABOUT DIFFERENT THINGS: NEARLY EVERY DAY
5. BEING SO RESTLESS THAT IT IS HARD TO SIT STILL: SEVERAL DAYS
8. IF YOU CHECKED OFF ANY PROBLEMS, HOW DIFFICULT HAVE THESE MADE IT FOR YOU TO DO YOUR WORK, TAKE CARE OF THINGS AT HOME, OR GET ALONG WITH OTHER PEOPLE?: EXTREMELY DIFFICULT
4. TROUBLE RELAXING: NEARLY EVERY DAY
IF YOU CHECKED OFF ANY PROBLEMS ON THIS QUESTIONNAIRE, HOW DIFFICULT HAVE THESE PROBLEMS MADE IT FOR YOU TO DO YOUR WORK, TAKE CARE OF THINGS AT HOME, OR GET ALONG WITH OTHER PEOPLE: EXTREMELY DIFFICULT
GAD7 TOTAL SCORE: 19
2. NOT BEING ABLE TO STOP OR CONTROL WORRYING: NEARLY EVERY DAY
GAD7 TOTAL SCORE: 19
1. FEELING NERVOUS, ANXIOUS, OR ON EDGE: NEARLY EVERY DAY

## 2024-03-28 ENCOUNTER — VIRTUAL VISIT (OUTPATIENT)
Dept: PSYCHOLOGY | Facility: CLINIC | Age: 49
End: 2024-03-28
Payer: COMMERCIAL

## 2024-03-28 DIAGNOSIS — F41.1 GENERALIZED ANXIETY DISORDER: ICD-10-CM

## 2024-03-28 DIAGNOSIS — F33.1 MODERATE EPISODE OF RECURRENT MAJOR DEPRESSIVE DISORDER (H): Primary | ICD-10-CM

## 2024-03-28 PROCEDURE — 90837 PSYTX W PT 60 MINUTES: CPT | Mod: 95 | Performed by: COUNSELOR
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
## 2024-03-29 ENCOUNTER — TELEPHONE (OUTPATIENT)
Dept: PALLIATIVE MEDICINE | Facility: CLINIC | Age: 49
End: 2024-03-29
Payer: COMMERCIAL

## 2024-04-01 ENCOUNTER — THERAPY VISIT (OUTPATIENT)
Dept: PHYSICAL THERAPY | Facility: CLINIC | Age: 49
End: 2024-04-01
Attending: PREVENTIVE MEDICINE
Payer: COMMERCIAL

## 2024-04-01 DIAGNOSIS — M54.50 CHRONIC BILATERAL LOW BACK PAIN WITHOUT SCIATICA: ICD-10-CM

## 2024-04-01 DIAGNOSIS — M79.18 MYOFASCIAL PAIN: Primary | ICD-10-CM

## 2024-04-01 DIAGNOSIS — G89.29 CHRONIC BILATERAL LOW BACK PAIN WITHOUT SCIATICA: ICD-10-CM

## 2024-04-01 DIAGNOSIS — M99.05 SOMATIC DYSFUNCTION OF PELVIC REGION: ICD-10-CM

## 2024-04-01 PROCEDURE — 97113 AQUATIC THERAPY/EXERCISES: CPT | Mod: GP | Performed by: PHYSICAL THERAPIST

## 2024-04-02 ENCOUNTER — THERAPY VISIT (OUTPATIENT)
Dept: PHYSICAL THERAPY | Facility: CLINIC | Age: 49
End: 2024-04-02
Attending: PREVENTIVE MEDICINE
Payer: COMMERCIAL

## 2024-04-02 DIAGNOSIS — M54.2 CERVICALGIA: Primary | ICD-10-CM

## 2024-04-02 PROCEDURE — 97162 PT EVAL MOD COMPLEX 30 MIN: CPT | Mod: GP | Performed by: PHYSICAL THERAPIST

## 2024-04-02 PROCEDURE — 97110 THERAPEUTIC EXERCISES: CPT | Mod: GP | Performed by: PHYSICAL THERAPIST

## 2024-04-02 PROCEDURE — 97530 THERAPEUTIC ACTIVITIES: CPT | Mod: GP | Performed by: PHYSICAL THERAPIST
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
## 2024-04-03 ENCOUNTER — ANCILLARY PROCEDURE (OUTPATIENT)
Dept: RADIOLOGY | Facility: CLINIC | Age: 49
End: 2024-04-03
Attending: FAMILY MEDICINE
Payer: COMMERCIAL

## 2024-04-03 DIAGNOSIS — F11.21 OPIOID DEPENDENCE IN REMISSION (H): Primary | ICD-10-CM

## 2024-04-03 DIAGNOSIS — F11.21 OPIOID DEPENDENCE IN REMISSION (H): ICD-10-CM

## 2024-04-04 ENCOUNTER — VIRTUAL VISIT (OUTPATIENT)
Dept: PSYCHOLOGY | Facility: CLINIC | Age: 49
End: 2024-04-04
Payer: COMMERCIAL

## 2024-04-04 DIAGNOSIS — G89.29 CHRONIC BILATERAL LOW BACK PAIN WITH BILATERAL SCIATICA: ICD-10-CM

## 2024-04-04 DIAGNOSIS — M54.6 CHRONIC BILATERAL THORACIC BACK PAIN: ICD-10-CM

## 2024-04-04 DIAGNOSIS — G89.29 CHRONIC NECK PAIN: ICD-10-CM

## 2024-04-04 DIAGNOSIS — M54.42 CHRONIC BILATERAL LOW BACK PAIN WITH BILATERAL SCIATICA: ICD-10-CM

## 2024-04-04 DIAGNOSIS — F33.1 MODERATE EPISODE OF RECURRENT MAJOR DEPRESSIVE DISORDER (H): Primary | ICD-10-CM

## 2024-04-04 DIAGNOSIS — G89.29 CHRONIC BILATERAL THORACIC BACK PAIN: ICD-10-CM

## 2024-04-04 DIAGNOSIS — F41.1 GENERALIZED ANXIETY DISORDER: ICD-10-CM

## 2024-04-04 DIAGNOSIS — M54.2 CHRONIC NECK PAIN: ICD-10-CM

## 2024-04-04 DIAGNOSIS — M54.41 CHRONIC BILATERAL LOW BACK PAIN WITH BILATERAL SCIATICA: ICD-10-CM

## 2024-04-04 DIAGNOSIS — M79.18 MYOFASCIAL PAIN: ICD-10-CM

## 2024-04-04 PROCEDURE — 90837 PSYTX W PT 60 MINUTES: CPT | Mod: 95 | Performed by: COUNSELOR

## 2024-04-04 NOTE — TELEPHONE ENCOUNTER
Please process a refill of DULOXETINE HCL 60MG CPEP  and BACLOFEN 20MG TABS to     [facility] [place]  [place], [place] - [address] [address]  Phone: [phone] Fax: [phone]

## 2024-04-04 NOTE — TELEPHONE ENCOUNTER
Received fax request from pharmacy requesting refill(s) for baclofen (LIORESAL) 20 MG tablet   -Last dispensed from pharmacy on 3/7/2024.  DULoxetine (CYMBALTA) 60 MG capsule  -Last dispensed from pharmacy on 1/24/2024 (90 day supply)     Pt last seen on 2/14/2024.  Next appt scheduled for 4/9/2024.    Will facilitate refill.
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
## 2024-04-05 RX ORDER — DULOXETIN HYDROCHLORIDE 60 MG/1
60 CAPSULE, DELAYED RELEASE ORAL EVERY MORNING
Qty: 90 CAPSULE | Refills: 0 | Status: SHIPPED | OUTPATIENT
Start: 2024-04-05 | End: 2024-04-09

## 2024-04-05 RX ORDER — BACLOFEN 20 MG/1
20 TABLET ORAL 2 TIMES DAILY
Qty: 180 TABLET | Refills: 0 | Status: SHIPPED | OUTPATIENT
Start: 2024-04-05 | End: 2024-05-29

## 2024-04-05 NOTE — TELEPHONE ENCOUNTER
Chart reviewed - Request appears appropriate. Refilled for  90 day supply.     Jenny Landrum DNP, APRN, AGNP-C  [facility]

## 2024-04-08 ENCOUNTER — MYC MEDICAL ADVICE (OUTPATIENT)
Dept: PALLIATIVE MEDICINE | Facility: CLINIC | Age: 49
End: 2024-04-08
Payer: COMMERCIAL

## 2024-04-09 ENCOUNTER — THERAPY VISIT (OUTPATIENT)
Dept: PHYSICAL THERAPY | Facility: CLINIC | Age: 49
End: 2024-04-09
Attending: PREVENTIVE MEDICINE
Payer: COMMERCIAL

## 2024-04-09 ENCOUNTER — VIRTUAL VISIT (OUTPATIENT)
Dept: PALLIATIVE MEDICINE | Facility: CLINIC | Age: 49
End: 2024-04-09
Payer: COMMERCIAL

## 2024-04-09 DIAGNOSIS — M54.42 CHRONIC BILATERAL LOW BACK PAIN WITH BILATERAL SCIATICA: Primary | ICD-10-CM

## 2024-04-09 DIAGNOSIS — G89.29 CHRONIC LOW BACK PAIN WITH BILATERAL SCIATICA, UNSPECIFIED BACK PAIN LATERALITY: ICD-10-CM

## 2024-04-09 DIAGNOSIS — M54.42 CHRONIC LOW BACK PAIN WITH BILATERAL SCIATICA, UNSPECIFIED BACK PAIN LATERALITY: ICD-10-CM

## 2024-04-09 DIAGNOSIS — M54.2 CHRONIC NECK PAIN: ICD-10-CM

## 2024-04-09 DIAGNOSIS — M54.41 CHRONIC LOW BACK PAIN WITH BILATERAL SCIATICA, UNSPECIFIED BACK PAIN LATERALITY: ICD-10-CM

## 2024-04-09 DIAGNOSIS — M54.6 CHRONIC BILATERAL THORACIC BACK PAIN: ICD-10-CM

## 2024-04-09 DIAGNOSIS — M79.18 MYOFASCIAL PAIN: ICD-10-CM

## 2024-04-09 DIAGNOSIS — M53.3 PAIN OF BOTH SACROILIAC JOINTS: ICD-10-CM

## 2024-04-09 DIAGNOSIS — G89.29 CHRONIC BILATERAL LOW BACK PAIN WITH BILATERAL SCIATICA: Primary | ICD-10-CM

## 2024-04-09 DIAGNOSIS — G89.29 CENTRAL SENSITIZATION TO PAIN: ICD-10-CM

## 2024-04-09 DIAGNOSIS — M54.16 LUMBAR RADICULOPATHY: ICD-10-CM

## 2024-04-09 DIAGNOSIS — M99.05 SOMATIC DYSFUNCTION OF PELVIC REGION: Primary | ICD-10-CM

## 2024-04-09 DIAGNOSIS — M54.10 RADICULAR PAIN OF UPPER EXTREMITY: ICD-10-CM

## 2024-04-09 DIAGNOSIS — G89.29 CHRONIC BILATERAL THORACIC BACK PAIN: ICD-10-CM

## 2024-04-09 DIAGNOSIS — M53.3 SI (SACROILIAC) JOINT DYSFUNCTION: ICD-10-CM

## 2024-04-09 DIAGNOSIS — M54.12 CERVICAL RADICULOPATHY: ICD-10-CM

## 2024-04-09 DIAGNOSIS — G89.29 CHRONIC NECK PAIN: ICD-10-CM

## 2024-04-09 DIAGNOSIS — M54.41 CHRONIC BILATERAL LOW BACK PAIN WITH BILATERAL SCIATICA: Primary | ICD-10-CM

## 2024-04-09 PROCEDURE — 99417 PROLNG OP E/M EACH 15 MIN: CPT | Mod: 95

## 2024-04-09 PROCEDURE — 97113 AQUATIC THERAPY/EXERCISES: CPT | Mod: GP | Performed by: PHYSICAL THERAPIST

## 2024-04-09 PROCEDURE — 99215 OFFICE O/P EST HI 40 MIN: CPT | Mod: 95

## 2024-04-09 RX ORDER — ALPRAZOLAM 0.5 MG
TABLET ORAL
COMMUNITY
Start: 2022-11-01

## 2024-04-09 RX ORDER — GABAPENTIN 300 MG/1
300 CAPSULE ORAL DAILY
COMMUNITY
Start: 2024-04-09 | End: 2024-07-23

## 2024-04-09 RX ORDER — DULOXETIN HYDROCHLORIDE 60 MG/1
60 CAPSULE, DELAYED RELEASE ORAL EVERY MORNING
Qty: 90 CAPSULE | Refills: 1 | Status: SHIPPED | OUTPATIENT
Start: 2024-04-09 | End: 2024-08-28

## 2024-04-09 RX ORDER — DULOXETIN HYDROCHLORIDE 30 MG/1
CAPSULE, DELAYED RELEASE ORAL
Qty: 90 CAPSULE | Refills: 1 | Status: SHIPPED | OUTPATIENT
Start: 2024-04-09 | End: 2024-08-28 | Stop reason: DRUGHIGH

## 2024-04-09 ASSESSMENT — PAIN SCALES - GENERAL: PAINLEVEL: SEVERE PAIN (6)

## 2024-04-09 NOTE — Clinical Note
Hi Dr. Rosario,  I saw Linette for follow up in the pain clinic today and am reaching out to collaborate on her care. She is not clear which clinic (Spine versus Pain) is supposed to managing neck versus low back injections. She had SIJ injections with pain clinic in the past and is scheduled for KINDRA 4/19/24 with Dr. Ivory (referred by Dr. Arteaga, saw him 9/28/23).   I am concerned for fragmented care/poor continuity and discussed consolidation of care. I recommended she clarify treatment focus with you at her upcoming appointment on 4/11/24 and will defer procedures with pain clinic for now.  Do you treat the whole spine or primarily focus on lumbar/SI? I am open to collaborate on care (pain clinic can assist with injections if needed) or defer all interventional treatment to Spine Team. Linette will continue to follow with me for medication management.   Thank you for your time, and I look forward to hearing from you.   Jenny Landrum, DNP, APRN, AGNP-C [facility]

## 2024-04-09 NOTE — PROGRESS NOTES
Video-Visit Details    Type of service:  Video Visit     Originating Location (pt. Location): Home    Distant Location (provider location):  On-site  Platform used for Video Visit: [facility]     Date of visit: 4/10/2024      Assessment:   Radha Beckwith is a 48 year old female with a past medical history significant for chronic bilateral low back pain, depression, SVT, opioid dependence in remission, anxiety, tobacco use, GERD, restless legs who presents with complaints of neck pain, mid and low back.      Low back pain - Onset of pain within last 2 years. Pain is mostly axial, though she does have intermittent radicular leg symptoms, occasionally extends below the knee. On exam, positive facet loading and positive KATHY, sacral thrust and Yeoman's bilaterally. Positive myofascial TTP, negative SLR and neuro exam WNL. Etiology is likely associated with multiple factors, including underlying degenerative changes of lumbar spine, facet and SIJ mediated components, with prominent overlying myofascial component.   Neck/thoracic back pain - Onset of pain within last 2 years. On exam, positive for myofascial TTP, most notably in lower thoracic paraspinals. Etiology is likely associated with multiple factors, including underlying degenerative changes, with prominent overlying myofascial component.      Assigned to [place] nursing team.     Visit Diagnoses:  1. Chronic bilateral low back pain with bilateral sciatica    2. 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 Chronic neck pain    4. Myofascial pain    5. Chronic bilateral thoracic back pain    6. Cervical radiculopathy    7. Lumbar radiculopathy    8. Pain of both sacroiliac joints    9. SI (sacroiliac) joint dysfunction    10. Radicular pain of upper extremity        Plan:  Diagnosis reviewed, treatment option addressed, and risk/benifits discussed.  Self-care instructions given.  I am recommending a multidisciplinary treatment plan to  help this patient better manage their pain.      Pain Physical Therapy:  Currently engaging in PT for low back/SI and neck pain. Continue therapy and activity as tolerated at home.      Pain Psychologist to address relaxation, behavioral change, coping style, and other factors important to improvement.  NO     Diagnostic Studies:  None      Medication Management:   Duloxetine -  Current dose 60 mg in morning and 30 mg at bedtime. Appreciates benefit, no new/significant concerns for side effects. Updated prescriptions for 60 mg and 30 mg capsules for 90 day supply sent into pharmacy today.   Gabapentin - 300 mg in morning. Discussed taper down/off at last visit, determined she appreciates benefit with morning dose, no side effects. Continue with current dose. No refills today, advised to contact clinic when needed.   Muscle relaxants:   Continue baclofen 20 mg twice daily.  Appreciates some degree of benefit. No side effects. Will plan to continue current dose for now.  Flexeril - continue 5-10 mg twice daily as needed. She takes in morning and afternoon, appreciates benefit for significant myofascial tightness. No side effects. PCP managing.   Allow 4-6 hours in between all muscle relaxant doses, avoid concurrent alcohol use.   [place] at [place] on 1/3/24. No clear benefit for pain with daytime products, some benefit at night with products used at bedtime.   Reports PCP advised alprazolam will not be continued (current dose 0.5 mg BID) due to medical cannabis use. She has appointment with new psychiatrist on 4/12/24, encouraged her to discuss continuation versus alternatives with provider. Also, could consider discussion with PCP, if she finds alprazolam benefit for anxiety more valuable than medical cannabis for pain. I am uncertain if medical cannabis program certification can be cancelled, but I am able to see visits to dispensary/products purchased.       Potential procedures:   Defer to Dr. Rosario at this time. Has follow up with [facility] on 4/11/24. Addressed my concerns for fragmented care with neck and low back procedures. Advised consolidating care for better continuity, specifically injections, and recommend she decide if she would like to continue scheduling with Dr. Ivory (closer to home in [place]) versus Pain Clinic (prior SI joint injections with Dr. Reynaga).   Message sent to Dr. Rosario to collaborate on her care.   Cervical JASE scheduled with Dr. Ivory on 4/19/24 (referred by Dr. Arteaga for injection in October 2023, delays with scheduling).      Other Orders/Referrals:   Recommend using TENS unit a few times throughout the day as needed, particularly when engaging in activities that may increase pain.      Follow up with JEANNIE Schroeder CNP in around 6-8 weeks, or sooner if needed.       Review of Electronic Chart: Today I have also reviewed available medical information in the patient's medical record at [facility] ([facility]) and Care Everywhere (if available), including relevant provider notes, laboratory work, and imaging.     Jenny Landrum, NAOMI, APRN, AGNP-C  [facility]     -------------------------------------------------    Subjective:    Chief complaint:   Chief Complaint   Patient presents with    Pain       Interval history:  Radha Beckwith is a 48 year old female last seen on 2/14/24.  They are a patient of mine seen in follow up.     Recommendations/plan at the last visit included:  Pain Physical Therapy:  Currently engaging in PT for low back/SI, recently referred for neck pain as well, rehab focused. May consider pain PT in future, pending outcome with current PT.      Pain Psychologist to address relaxation, behavioral change, coping style, and other factors important to improvement.  NO     Diagnostic Studies:  None      Medication Management:   Duloxetine -  Current dose 60 mg in morning and 30 mg at  "bedtime. Dosage increase recommended at last visit. She does appreciate some degree of benefit, notes some \"funky\" symptoms since last visit but not sure it is related to duloxetine. Of note, she tapered down on gabapentin in between visits, possible she is having symptoms related to this. Recommend continuing at current dose, then will re-assess benefit and possible side effects at follow up.   Gabapentin - she began self-taper since last visit, current dose 300 mg twice daily. Reports onset of tingling sensations since last visit, possibly related to decreasing gabapentin. Advised to discontinue altogether (reduce to 300 mg daily x 3 days, then stop) and continue to monitor for changes in pain level/intensity of tingling. May consider resuming gabapentin, if symptoms worsen, advised to contact clinic in between visits with any concerns.   Continue baclofen 20 mg twice daily.  She reports small improvement after starting baclofen. No side effects. Will plan to continue current dose for now.  Flexeril - continue 5-10 mg twice daily as needed. She takes in morning and afternoon, appreciates benefit for significant myofascial tightness. No side effects. PCP managing.   Allow 4-6 hours in between all muscle relaxant doses, avoid concurrent alcohol use.   [place] on 1/3/24. Reports completing registration and had visit with dispensary. Currently using \"Lary\" tablets in morning and evening. Recommend continuing to work with dispensary to trial different cannabis products.      Potential procedures:   None at this time. She has follow up with spine team (Dr. Rosario) in April.     Other Orders/Referrals:   Recommend using TENS unit a few times throughout the day as needed, particularly when engaging in activities that may increase pain.      Follow up with JEANNIE Schroeder CNP in around 8 weeks     Since her last visit, Radha Beckwith reports:  -She " reports pain continues to persist, worse to some extent.   -Low back pain continues to persist, she is wondering about alternative procedures.   -She's had SIJ injections x 2. First injection on 5/16/23, reports 75% improvement for 3 months. Then repeat on 11/2/23, did not appreciate as much benefit.   -She wonders if SI joint injection on 11/2/23 was more helpful than she thought. It is difficult for her to determine how much benefit she actually appreciated from repeat SI joint injections,   -She was scheduled for in person visit, but was not feeling well, has sore throat, and asked to convert to video visit.   -She reports increased pain/sensitivity in head/neck and face in the morning, also notes hot sensation.   -She continues to engage in PT. Notes they will be focusing on this during therapy.   -She is scheduled for cervical JASE on 4/19/24 with Dr. Ivory.   -She is also following [facility], who referred to Dr. Ivory for procedures.   -Duloxetine dose 60 mg in morning and 30 mg in evening. Reports pain benefit, no new/major concerns for side effects.   -Gabapentin taper discussed at last visit, she continues to take 300 mg in morning and appreciates benefit with daily dosing.   -She reports PCP expressed concerns for continuing alprazolam with recent medical cannabis certification. She was advised PCP would no longer prescribe.   -[place] has been trying products, but she is having some side effects.   -She reports some degree of pain relief with products at night, but daytime products do not help as much.   -She will be seeing new psychiatrist, initial appt on 4/12/24. She plans to discuss continuation of xanax with new provider.     Pain Information:   Pain rating: averages 7/10 on a 0-10 scale.      Interval history from last visit on 2/14/24:  -Recommended duloxetine increase at last visit, 60 mg in morning and 30 mg in evening. She thinks she is  "appreciating some degree of benefit, but she is having different \"funky\" symptoms coming up with med adjustments. She does not think these \"funky\" symptoms are necessarily related to duloxetine, but in general.   -She reports having \"buzzing/tingling\" sensation for about 10 minutes after stopping TENS unit, also having some shooting pains in back that are new.   -Certified for medical cannabis at last visit, reports she has been to dispensary and is taking \"Lary\" pills in morning and evening. She appreciates benefit.   -She went to ENT on Monday for tinnitus and buzzing in face. She states that this provider thinks this is more so related to cervicalgia.   -She is currently engaging in pain PT 2 x week, notes ENT referred her for PT focusing on neck.   -She is working on sliding scale disability, wants to make sure that her chart is updated accordingly.   -She reports right middle finger has been swollen and can't move it, with prominent ringing in her ears, \"knot\" on neck was really sore, states she felt like \"blood was coming back into body.\"   -She states that the tingling and burning sensation in face, states this is new for her.   -She has been self tapering down on gabapentin, started around the time duloxetine was increased. We had discussed holding off on making too many changes all at once, she researched how to taper off. She has not appreciated much difference in pain with tapering down on gabapentin. Current dose is 300 in AM and 300 mg at bedtime. She states she has been at this dose for 2 weeks. She is open to taper down and then monitor carefully for 2 weeks.   Pain Information:              Pain rating: averages 7/10 on a 0-10 scale.        Interval history from last visit on 1/3/24:  -Her pain has not improved since last visit. She notes worsening neck pain and had ED visit on 11/13/23.   -Repeat SIJ injections on 11/2/23 with Dr. Reynaga. She reports injection was not helpful.   -Gabapentin " "increased at last visit, she reports marginal benefit. Will likely taper off at next visit, pending outcome with duloxetine increase.   -Discussed medical cannabis at last visit. She states she tried OTC cannabis product from smoke shop that had delta 9, did not like the psychoactive effects and no pain benefit. However, she [place] open [place] program[place] which [place] discussed [place] more controlled and targeting pain.   -She states she is restarting PT with new provider. She has been rescheduled numerous times with different people recently, which has been frustrating.   -she is dealing with some other health concerns right now, states there was finding in her ear on recent imaging.   -She follows with [facility], Dr. Arteaga, recently referred to Dr. Rosario for nonsurgical tx of low back pain. Will defer tx recs for low back to NS at this time.   Pain Information:              Pain rating: averages 7/10 on a 0-10 scale.        Interval history from last visit on 10/4/23:  -She has been struggling with pain since last visit.  -She had ED visit prior to and following last visit.   -She reports second ED visit on 9/12/23 was a horrible experience due to pain and treatment received.   -She continues to have neck and headache pain.  -She has wrist pain, particularly with typing, and extends into hand.   -She had EMG done since last visit that was negative for CTS.   -She is very frustrated without having answer to why she is having significantly increased pain.   -She is having posterior neck pain with radiation into posterior head.   -She reports having blurred vision, headache pain alternatives sides.   -She states the side of her head \"feels funky,\" somewhat similar to Bell's palsy she had in the past.   -She was started on gabapentin 300 mg TID at last visit, feels like pain has improved slightly, particularly when she is at rest.   -She does notice some daytime sedation since starting gabapentin but otherwise no side effects.   -She " "is open to increasing gabapentin dosage.   -She is also potentially interested in medical cannabis.   Pain Information:              Pain rating: averages 8/10 on a 0-10 scale.        Interval history from last visit on 9/7/23:  -her pain is worse than it was at last visit.   -She reports neck pain has worsened, also having \"new\" pain in mid back that is constant.   -She does not notice any change in pain levels with bending/twisting.   -She had ED visit yesterday, largely unremarkable workup.   -She reports neck pain has been worsening since July when she saw pain PT.   -She continues to work with pain PT with [facility], last visit 9/5/23.   -She states she was doing some of the PT exercises wrong and was finally corrected at this last pain PT visit.   -She reports she went to chiropractor last week, had massage and adjustment, no change in pain levels.   -She is having pain radiating into BUE,   -Cervical CT yesterday demonstrated mild C4-5 discogenic pathology.  -She reports having ringing in ears since June.   -She states she is keeping diary of pain symptoms.   -She is open to trial gabapentin.   -She tried Lyrica in the past that caused weird dreams.   Pain Information:              Pain rating: averages 8/10 on a 0-10 scale.        Interval history from last visit on 7/13/23:  -her pain is about the same as it was at last visit in neck, low back is improved since SI joint to an extent.   -She was referred to pain PT at last visit, has been seeing [facility].   -She states she is \"extremely sensitive,\" so touching is limited during visits.   -She is doing some neck stretching.   -She has not appreciated profound difference from pain PT yet.   -She had SI joint injection with Dr. Reynaga on 5/16/23.  -She reports substantial improvement in pain for about a month, then started to have some pain symptoms return.   -She continued to appreciate some benefit from injection for a while longer, less " intensity of pain.   -She notes she had migraines x 2 weeks, which she wonders if is attributed to stress of having her son home.   -She also had teeth removal.   -She is taking baclofen 10 mg BID  -She has not appreciated much improvement in neck and low back myofascial pain.   -She continues to take flexeril as well, notes this is helpful when she is very stiff in morning.   -She is open to baclofen increase.   -She understands she needs to space out muscle relaxants doses by 4 hours.   -She is using TENS unit a little bit, will be taking to pain PT.   Pain Information:              Pain rating: averages 7/10 on a 0-10 scale.           HPI from initial visit with me on 4/26/23:  Radha Beckwith is a 47 year old female presents with a chief complaint of neck pain/upper back and radicular low back pain.      The pain has been present for 1.5-2 years current pain episode.    The pain is Severe Pain (6) in severity.    The pain is described as dull ache in mid back, cramping, sharp, pressure, throbbing, burning in low back, radicular pain into hips, legs, butt, stops above knee.   The pain is alleviated by position changes, massage/heat device, exercise.    It is exacerbated by prolonged sitting, pain is constant but fluctuates with activity, prolonged standing and walking.    Modalities that have been utilized in the past which were helpful include PT.    Things that were not helpful, but tried ,include back brace, LESI x 1, TPI (?), TENS unit (OTC from [employer]).    The patient has never tried pain PT, SI joint/facet.  The patient otherwise denies bowel or bladder incontinence, parasthesias, weakness, saddle anesthesia, unintentional weight loss, or fever/chills/sweats.   Radha Beckwith has been seen at a pain clinic in the past.  She sees addiction med in [place], [facility] with Dr. Ivory.   -She is not currently working due to pain, off work for 1.5 years.   -She looked into disability but was advised  by  that they would not take on her case.   -She had L5-S1 JASE 7/2022 with Dr. Ivory in [place].   -She saw neurosurgery in the past as well in 2021.   -She saw pain provider through [facility], did not care for provider.   -She has 1 kid, 25 year old boy, [employer] and .            Current Pain Treatments:     Medications:               Suboxone 2-0.5 mg daily (weaning off)              Duloxetine 60 mg in morning and 30 mg at bedtime               Ropinirole 1 mg at bedtime               Cyclobenzaprine 5-10 mg BID PRN               Baclofen 20 mg BID              Gabapentin 300 mg in morning.      2. Other therapies:               TENS              PT - low back/SI and recent referral for neck pain   [facility] on 4/19/24 with Dr. Ivory         Current MME: N/A     Review of [place] Prescription Monitoring Program (): No concern for abuse or misuse of controlled medications based on this report. Reviewed - appears appropriate.      Past pain treatments:  SI joint injection 5/16/23 -significant benefit x1 month, then diminishing efficacy        Medications:  Current Outpatient Medications   Medication Sig Dispense Refill    ALPRAZolam (XANAX) 0.5 MG tablet       DULoxetine (CYMBALTA) 30 MG capsule Take 30 mg in evening, along with 60 mg in morning. 90 capsule 1    DULoxetine (CYMBALTA) 60 MG capsule Take 1 capsule (60 mg) by mouth every morning 90 capsule 1    gabapentin (NEURONTIN) 300 MG capsule Take 1 capsule (300 mg) by mouth daily      acetaminophen (TYLENOL) 500 MG tablet Take 1,000 mg by mouth every 6 hours as needed for mild pain      baclofen (LIORESAL) 20 MG tablet TAKE ONE TABLET BY MOUTH TWICE A  tablet 0    bisacodyl (DULCOLAX) 5 MG EC tablet Take 1 tablet (5 mg) by mouth every other day Take every other day. If no bowel movement for 2 or more days, take 2 tablets of bisacodyl (10 mg) 30 tablet 2    buprenorphine HCl-naloxone HCl (SUBOXONE) 2-0.5 MG per film Place 0.5 Film  under the tongue daily       cetirizine (ZYRTEC) 10 MG tablet TAKE ONE TABLET BY MOUTH EVERY MORNING (Patient not taking: Reported on 4/9/2024) 90 tablet 3    cyclobenzaprine (FLEXERIL) 5 MG tablet TAKE 1-2 TABLETS (5-10 MG) BY MOUTH 3 TIMES DAILY AS NEEDED FOR MUSCLE SPASMS 90 tablet 5    famotidine (PEPCID) 40 MG tablet TAKE 1 TABLET (40 MG) BY MOUTH 2 TIMES DAILY 60 tablet 1    lactulose (CHRONULAC) 10 GM/15ML solution Take 15 mLs (10 g) by mouth 3 times daily as needed for constipation (as needed for severe constipaiton, no BM for more than 3 days and feeling constipated) 300 mL 3    ondansetron (ZOFRAN ODT) 4 MG ODT tab DISSOLVE ONE TABLET ON TONGUE EVERY SIX HOURS AS NEEDED FOR NAUSEA 20 tablet 1    pantoprazole (PROTONIX) 40 MG EC tablet TAKE ONE TABLET BY MOUTH TWICE A DAY WITH MEALS 60 tablet 5    polyethylene glycol (MIRALAX) 17 GM/Dose powder Take 17 g by mouth daily 578 g 3    rOPINIRole (REQUIP) 1 MG tablet TAKE ONE TABLET BY MOUTH EVERY NIGHT AT BEDTIME 90 tablet 0    simvastatin (ZOCOR) 10 MG tablet TAKE ONE TABLET BY MOUTH EVERY NIGHT AT BEDTIME 90 tablet 0    zolpidem (AMBIEN) 5 MG tablet TAKE ONE TABLET (5 MG) BY MOUTH NIGHTLY AS NEEDED FOR SLEEP 30 tablet 1       Medical History: any changes in medical history since they were last seen? No      Objective:    Physical Exam:  not currently breastfeeding.  GENERAL: alert and no distress  EYES: Eyes grossly normal to inspection.  No discharge or erythema, or obvious scleral/conjunctival abnormalities.  RESP: No audible wheeze, cough, or visible cyanosis.    SKIN: Visible skin clear. No significant rash, abnormal pigmentation or lesions.  NEURO: Cranial nerves grossly intact.  Mentation and speech appropriate for age.  PSYCH: Appropriate affect, tone, and pace of words      Diagnostic Tests/Imaging/Labs:  BMP 1/5/24 - GFR >90    BILLING TIME DOCUMENTATION:   The total TIME spent on this patient on the date of the encounter/appointment was 65 minutes.       TOTAL TIME includes:   Time spent preparing to see the patient (reviewing records and tests)   Time spent face to face (or over the phone) with the patient   Time spent ordering tests, medications, procedures and referrals   Time spent Referring and communicating with other healthcare professionals   Time spent documenting clinical information in Epic

## 2024-04-09 NOTE — TELEPHONE ENCOUNTER
Today's appointment was changed to a virtual.    Damaris RN-BSN  [facility] [place]   [phone]

## 2024-04-09 NOTE — PATIENT INSTRUCTIONS
Pain Physical Therapy:  Currently engaging in PT for low back/SI and neck pain. Continue therapy and activity as tolerated at home.      Pain Psychologist to address relaxation, behavioral change, coping style, and other factors important to improvement.  NO     Diagnostic Studies:  None      Medication Management:   Duloxetine -  Current dose 60 mg in morning and 30 mg at bedtime. Appreciates benefit, no new/significant concerns for side effects. Updated prescriptions for 60 mg and 30 mg capsules for 90 day supply sent into pharmacy today.   Gabapentin - 300 mg in morning. Discussed taper down/off at last visit, determined she appreciates benefit with morning dose, no side effects. Continue with current dose. No refills today, advised to contact clinic when needed.   Muscle relaxants:   Continue baclofen 20 mg twice daily.  Appreciates some degree of benefit. No side effects. Will plan to continue current dose for now.  Flexeril - continue 5-10 mg twice daily as needed. She takes in morning and afternoon, appreciates benefit for significant myofascial tightness. No side effects. PCP managing.   Allow 4-6 hours in between all muscle relaxant doses, avoid concurrent alcohol use.   Medical cannabis - [place] for [place] medical cannabis program at last visit on 1/3/24. No clear benefit for pain with daytime products, some benefit at night with products used at bedtime.   Reports PCP advised alprazolam will not be continued (current dose 0.5 mg BID) due to medical cannabis use. She has appointment with new psychiatrist on 4/12/24, encouraged her to discuss continuation versus alternatives with provider. Also, could consider discussion with PCP, if she finds alprazolam benefit for anxiety more valuable than medical cannabis for pain. I am uncertain if medical cannabis program certification can be cancelled, but I am able to see visits to dispensary/products purchased.      Potential procedures:   Defer to Dr. Rosario at this  time. [facility] 4[facility]11/24. Addressed my concerns for fragmented care with neck and low back procedures. Advised consolidating care for better continuity, specifically injections, and recommend she decide if she would like to continue scheduling with Dr. Ivory (closer to home in [place]) versus Pain Clinic (prior SI joint injections with Dr. Reynaga).   Message sent to Dr. Rosario to collaborate on her care.   Cervical JASE scheduled with Dr. Ivory on 4/19/24 (referred by Dr. Arteaga for injection in October 2023, delays with scheduling).      Other Orders/Referrals:   Recommend using TENS unit a few times throughout the day as needed, particularly when engaging in activities that may increase pain.      Follow up with JEANNIE Schroeder CNP in around 6-8 weeks, or sooner if needed.

## 2024-04-09 NOTE — PROGRESS NOTES
Patient presents to the clinic today for a follow up with JEANNIE Schroeder CNP  regarding Pain Management.     Linette is a 48 year old who is being evaluated via a billable video visit.    How would you like to obtain your AVS? MyChart  If the video visit is dropped, the invitation should be resent by: Text to cell phone: [phone]  Will anyone else be joining your video visit? No    Video-Visit Details    Type of service:  Video Visit     Platform used for Video Visit: Mai    Is Pt currently in [place]? Yes    NOTE:  If Pt is not in [place], Appointment needs to be canceled and rescheduled             9/7/2023     3:03 PM 10/4/2023     1:02 PM 4/9/2024    11:06 AM   PEG Score   PEG Total Score 8.67 9.33 8.33          Peyton Gomez MA  [facility]

## 2024-04-10 ENCOUNTER — VIRTUAL VISIT (OUTPATIENT)
Dept: PSYCHOLOGY | Facility: CLINIC | Age: 49
End: 2024-04-10
Payer: COMMERCIAL

## 2024-04-10 DIAGNOSIS — F41.1 GENERALIZED ANXIETY DISORDER: ICD-10-CM

## 2024-04-10 DIAGNOSIS — F33.1 MODERATE EPISODE OF RECURRENT MAJOR DEPRESSIVE DISORDER (H): Primary | ICD-10-CM

## 2024-04-10 PROCEDURE — 90837 PSYTX W PT 60 MINUTES: CPT | Mod: 95 | Performed by: COUNSELOR

## 2024-04-10 NOTE — PROGRESS NOTES
Answers submitted by the patient for this visit:  Patient Health Questionnaire (Submitted on 4/10/2024)  If you checked off any problems, how difficult have these problems made it for you to do your work, take care of things at home, or get along with other people?: Extremely difficult  PHQ9 TOTAL SCORE: 22        [facility]                                     Progress Note    Patient Name: Radha Beckwith  Date: 4/10/24         Service Type: Individual      Session Start Time: 2:05pm Session End Time: 3:00pm     Session Length: 55    Session #: 31    Attendees: Client    Service Modality:  Video Visit:      Provider verified identity through the following two step process.  Patient provided:  Patient is known previously to provider    Telemedicine Visit: The patient's condition can be safely assessed and treated via synchronous audio and visual telemedicine encounter.      Reason for Telemedicine Visit: Patient convenience (e.g. access to timely appointments / distance to available provider)    Originating Site (Patient Location): Patient's home    Distant Site (Provider Location): Provider Remote Setting- Home Office    Consent:  The patient/guardian has verbally consented to: the potential risks and benefits of telemedicine (video visit) versus in person care; bill my insurance or make self-payment for services provided; and responsibility for payment of non-covered services.     Patient would like the video invitation sent by:  My Chart    Mode of Communication:  Video Conference via Amwell    Distant Location (Provider):  On-site    As the provider I attest to compliance with applicable laws and regulations related to telemedicine.    DATA  Interactive Complexity: No  Crisis: No        Progress Since Last Session (Related to Symptoms / Goals / Homework):   Symptoms: No change .    Homework: Completed in session      Episode of Care Goals: Minimal progress - ACTION (Actively working towards  change); Intervened by reinforcing change plan / affirming steps taken     Current / Ongoing Stressors and Concerns:   The client stated she's been working on organizational things around her house when she can to help herself feel better.   Client stated she's interested in quitting smoking.   Client's son is going to training next month in [place] for training to go back for a tour in [place]. Stated her son has opened up some about his last tour.        Treatment Objective(s) Addressed in This Session:   Increase interest, engagement, and pleasure in doing things  Feel less tired and more energy during the day        Intervention:   Client stated she was feeling pretty distracted today. She was working on organizing as much as she could manage with her pain today. Talked about her quitting smoking and encouraged her to talk with her doctor and insurance about programs. Talked about her son and if she's feeling like she needs to process through some of the things he's told her about his last tour in [place]. Continued to work on relationship building skills.           Assessments completed prior to visit:  The following assessments were completed by patient for this visit:  PHQ9:       1/31/2024    11:58 PM 2/15/2024     1:02 AM 2/22/2024     2:26 PM 2/29/2024     1:46 PM 3/14/2024     2:53 PM 3/27/2024     8:50 PM 4/10/2024     1:59 PM   PHQ-9 SCORE   PHQ-9 Total Score MyChart 18 (Moderately severe depression) 22 (Severe depression) 22 (Severe depression) 21 (Severe depression) 24 (Severe depression) 22 (Severe depression) 22 (Severe depression)   PHQ-9 Total Score 18 22 22 21 24    24 22 22     GAD7:       12/10/2023     4:50 AM 1/2/2024    10:50 AM 1/18/2024     2:56 PM 2/5/2024     6:27 PM 2/19/2024     3:39 PM 3/12/2024     8:32 PM 3/27/2024     8:51 PM   BO-7 SCORE   Total Score 21 (severe anxiety) 20 (severe anxiety) 20 (severe anxiety) 21 (severe anxiety) 19 (severe anxiety) 20 (severe anxiety) 19 (severe  anxiety)   Total Score 21 20 20    20 21 19 20 19     PROMIS 10-Global Health (all questions and answers displayed):       11/19/2023     9:34 AM 11/26/2023    10:43 PM 2/27/2024     6:40 PM 3/12/2024     8:35 PM 3/27/2024     8:53 PM 4/2/2024     4:38 PM 4/10/2024     1:58 PM   PROMIS 10   In general, would you say your health is: Poor Fair Fair Poor Poor Poor Poor   In general, would you say your quality of life is: Poor Poor Fair Poor Poor Poor Poor   In general, how would you rate your physical health? Poor Poor Poor Poor Poor Poor Poor   In general, how would you rate your mental health, including your mood and your ability to think? Poor Poor Fair Poor Poor Poor Poor   In general, how would you rate your satisfaction with your social activities and relationships? Poor Poor Poor Poor Poor Poor Poor   In general, please rate how well you carry out your usual social activities and roles Poor Fair Poor Poor Poor Poor Fair   To what extent are you able to carry out your everyday physical activities such as walking, climbing stairs, carrying groceries, or moving a chair? Mostly A little A little A little A little A little A little   In the past 7 days, how often have you been bothered by emotional problems such as feeling anxious, depressed, or irritable? Always Always Always Always Always Always Always   In the past 7 days, how would you rate your fatigue on average? Severe Severe Severe Severe Severe Severe Severe   In the past 7 days, how would you rate your pain on average, where 0 means no pain, and 10 means worst imaginable pain? 7 7 7 8 7 7 7   In general, would you say your health is: 1 2 2 1 1 1 1   In general, would you say your quality of life is: 1 1 2 1 1 1 1   In general, how would you rate your physical health? 1 1 1 1 1 1 1   In general, how would you rate your mental health, including your mood and your ability to think? 1 1 2 1 1 1 1   In general, how would you rate your satisfaction with your  social activities and relationships? 1 1 1 1 1 1 1   In general, please rate how well you carry out your usual social activities and roles. (This includes activities at home, at work and in your community, and responsibilities as a parent, child, spouse, employee, friend, etc.) 1 2 1 1 1 1 2   To what extent are you able to carry out your everyday physical activities such as walking, climbing stairs, carrying groceries, or moving a chair? 4 2 2 2 2 2 2   In the past 7 days, how often have you been bothered by emotional problems such as feeling anxious, depressed, or irritable? 5 5 5 5 5 5 5   In the past 7 days, how would you rate your fatigue on average? 4 4 4 4 4 4 4   In the past 7 days, how would you rate your pain on average, where 0 means no pain, and 10 means worst imaginable pain? 7 7 7 8 7 7 7   Global Mental Health Score 4 4 6 4 4 4 4    4   Global Physical Health Score 9 7 7 7 7 7 7    7   PROMIS TOTAL - SUBSCORES 13 11 13 11 11 11 11    11     [place] Suicide Severity Rating Scale (Lifetime/Recent)      10/4/2022    11:00 AM   [place] Suicide Severity Rating (Lifetime/Recent)   Q1 Wished to be Dead (Past Month) no   Q2 Suicidal Thoughts (Past Month) no   Q3 Suicidal Thought Method no   Q4 Suicidal Intent without Specific Plan no   Q5 Suicide Intent with Specific Plan yes   Q6 Suicide Behavior (Lifetime) yes   Within the Past 3 Months? no   Level of Risk per Screen high risk         ASSESSMENT: Current Emotional / Mental Status (status of significant symptoms):   Risk status (Self / Other harm or suicidal ideation)   Patient denies current fears or concerns for personal safety.   Patient denies current or recent suicidal ideation or behaviors.   Patient denies current or recent homicidal ideation or behaviors.   Patient denies current or recent self injurious behavior or ideation.   Patient denies other safety concerns.   Patient reports there has been no change in risk factors since their last session.      Patient reports there has been no change in protective factors since their last session.     Recommended that patient call 911 or go to the local ED should there be a change in any of these risk factors.     Appearance:   Appropriate    Eye Contact:   Good    Psychomotor Behavior: Normal    Attitude:   Cooperative    Orientation:   All   Speech    Rate / Production: Normal/ Responsive Normal     Volume:  Normal    Mood:    Normal   Affect:    Appropriate    Thought Content:  Clear  Rumination    Thought Form:  Coherent  Tangential    Insight:    Good      Medication Review:   No changes to current psychiatric medication(s)     Medication Compliance:   Yes     Changes in Health Issues:   None reported     Chemical Use Review:   Substance Use: Chemical use reviewed, no active concerns identified      Tobacco Use: No change in amount of tobacco use since last session.  Patient declined discussion at this time    Diagnosis:  1. Moderate episode of recurrent major depressive disorder (H)    2. Generalized anxiety disorder          Collateral Reports Completed:   Not Applicable    PLAN: (Patient Tasks / Therapist Tasks / Other)  Continue to continue to work continuing to build upon her relationship with her son and talking with her doctor/insurance about quitting smoking.          Ricarda Bhatia, [facility]                                                         ______________________________________________________________________    Individual Treatment Plan    Patient's Name: Radha Beckwith  YOB: 1975    Date of Creation: 6/28/23  Date Treatment Plan Last Reviewed/Revised: 2/15/24    DSM5 Diagnoses: 296.32 (F33.1) Major Depressive Disorder, Recurrent Episode, Moderate _  Psychosocial / Contextual Factors: living with boyfriend, memory issues  PROMIS (reviewed every 90 days):     Referral / Collaboration:  Referral to another professional/service is not indicated at this time..    Anticipated number  of session for this episode of care: 9-12 sessions  Anticipation frequency of session:  as needed  Anticipated Duration of each session: 38-52 minutes  Treatment plan will be reviewed in 90 days or when goals have been changed.       MeasurableTreatment Goal(s) related to diagnosis / functional impairment(s)  Goal 1: Patient will increase communication skills with family members.    Objective #A (Patient Action)    Patient will learn & utilize at least 2 assertive communication skills weekly.  Status: Continued - Date(s): 2/15/24    Intervention(s)  Therapist will teach emotional regulation skills. distress tolerance skills, interpersonal effectiveness skills, emotion regluation skills, mindfulness skills, radical acceptance. Therapist will teach client how to ID body cues for anxiety, anxiety reduction techniques, how to ID triggers for depression and anxiety- decrease reactivity/eliminate, lifestyle changes to reduce depression and anxiety, communication skills, explore cognitive beliefs and help client to develop healthy cognitive patterns and beliefs.    Objective #B  Patient will use thought-stopping strategy daily to reduce intrusive thoughts.  Status: Continued - Date(s): 2/15/24    Intervention(s)  Therapist will teach emotional regulation skills. distress tolerance skills, interpersonal effectiveness skills, emotion regluation skills, mindfulness skills, radical acceptance. Therapist will teach client how to ID body cues for anxiety, anxiety reduction techniques, how to ID triggers for depression and anxiety- decrease reactivity/eliminate, lifestyle changes to reduce depression and anxiety, communication skills, explore cognitive beliefs and help client to develop healthy cognitive patterns and beliefs.      Goal 2: Patient will decrease depression symptoms.      Objective #A (Patient Action)    Status: Continued - Date(s): 2/15/24    Patient will Increase interest, engagement, and pleasure in doing  things  Decrease frequency and intensity of feeling down, depressed, hopeless  Improve quantity and quality of night time sleep / decrease daytime naps  Feel less tired and more energy during the day   Improve diet, appetite, mindful eating, and / or meal planning  Identify negative self-talk and behaviors: challenge core beliefs, myths, and actions  Improve concentration, focus, and mindfulness in daily activities   Feel less fidgety, restless or slow in daily activities / interpersonal interactions.    Intervention(s)  Therapist will teach emotional regulation skills. distress tolerance skills, interpersonal effectiveness skills, emotion regluation skills, mindfulness skills, radical acceptance. Therapist will teach client how to ID body cues for anxiety, anxiety reduction techniques, how to ID triggers for depression and anxiety- decrease reactivity/eliminate, lifestyle changes to reduce depression and anxiety, communication skills, explore cognitive beliefs and help client to develop healthy cognitive patterns and beliefs.    Objective #B  Patient will identify three distraction and diversion activities and use those activities to decrease level of anxiety  .    Status: Continued - Date(s):  2/15/24    Intervention(s)  Therapist will teach emotional regulation skills. distress tolerance skills, interpersonal effectiveness skills, emotion regluation skills, mindfulness skills, radical acceptance. Therapist will teach client how to ID body cues for anxiety, anxiety reduction techniques, how to ID triggers for depression and anxiety- decrease reactivity/eliminate, lifestyle changes to reduce depression and anxiety, communication skills, explore cognitive beliefs and help client to develop healthy cognitive patterns and beliefs.      Patient has reviewed and agreed to the above plan.      Ricarda Bhatia [facility]

## 2024-04-11 ENCOUNTER — OFFICE VISIT (OUTPATIENT)
Dept: NEUROSURGERY | Facility: CLINIC | Age: 49
End: 2024-04-11
Payer: COMMERCIAL

## 2024-04-11 ENCOUNTER — THERAPY VISIT (OUTPATIENT)
Dept: PHYSICAL THERAPY | Facility: CLINIC | Age: 49
End: 2024-04-11
Attending: PREVENTIVE MEDICINE
Payer: COMMERCIAL

## 2024-04-11 ENCOUNTER — TELEPHONE (OUTPATIENT)
Dept: NEUROLOGY | Facility: CLINIC | Age: 49
End: 2024-04-11

## 2024-04-11 ENCOUNTER — MEDICAL CORRESPONDENCE (OUTPATIENT)
Dept: HEALTH INFORMATION MANAGEMENT | Facility: CLINIC | Age: 49
End: 2024-04-11

## 2024-04-11 VITALS
SYSTOLIC BLOOD PRESSURE: 149 MMHG | WEIGHT: 179 LBS | HEIGHT: 64 IN | BODY MASS INDEX: 30.56 KG/M2 | HEART RATE: 111 BPM | DIASTOLIC BLOOD PRESSURE: 87 MMHG

## 2024-04-11 DIAGNOSIS — M54.6 CHRONIC BILATERAL THORACIC BACK PAIN: ICD-10-CM

## 2024-04-11 DIAGNOSIS — G89.29 CHRONIC BILATERAL THORACIC BACK PAIN: ICD-10-CM

## 2024-04-11 DIAGNOSIS — M53.3 PAIN OF BOTH SACROILIAC JOINTS: Primary | ICD-10-CM

## 2024-04-11 DIAGNOSIS — M99.05 SOMATIC DYSFUNCTION OF PELVIS REGION: Primary | ICD-10-CM

## 2024-04-11 DIAGNOSIS — M54.50 CHRONIC BILATERAL LOW BACK PAIN WITHOUT SCIATICA: ICD-10-CM

## 2024-04-11 DIAGNOSIS — M54.2 NECK PAIN: ICD-10-CM

## 2024-04-11 DIAGNOSIS — M54.2 CERVICALGIA: Primary | ICD-10-CM

## 2024-04-11 DIAGNOSIS — R29.898 MUSCULAR DECONDITIONING: ICD-10-CM

## 2024-04-11 DIAGNOSIS — G89.29 CHRONIC BILATERAL LOW BACK PAIN WITHOUT SCIATICA: ICD-10-CM

## 2024-04-11 DIAGNOSIS — M54.16 LUMBAR RADICULOPATHY: ICD-10-CM

## 2024-04-11 PROCEDURE — 99214 OFFICE O/P EST MOD 30 MIN: CPT | Performed by: PREVENTIVE MEDICINE

## 2024-04-11 PROCEDURE — 97530 THERAPEUTIC ACTIVITIES: CPT | Mod: GP | Performed by: PHYSICAL THERAPIST

## 2024-04-11 PROCEDURE — 97110 THERAPEUTIC EXERCISES: CPT | Mod: GP | Performed by: PHYSICAL THERAPIST

## 2024-04-11 PROCEDURE — 97012 MECHANICAL TRACTION THERAPY: CPT | Mod: GP | Performed by: PHYSICAL THERAPIST

## 2024-04-11 PROCEDURE — 97535 SELF CARE MNGMENT TRAINING: CPT | Mod: GP | Performed by: PHYSICAL THERAPIST

## 2024-04-11 ASSESSMENT — PAIN SCALES - GENERAL: PAINLEVEL: SEVERE PAIN (7)

## 2024-04-11 NOTE — NURSING NOTE
"Reason For Visit:   Chief Complaint   Patient presents with    RECHECK     Back pain       Occupation: Former customer Service  Currently working? No.  Work status?   unemployed .     Sports: n  Activities: walking           BP (!) 149/87   Pulse 111   Ht 1.626 m (5' 4\")   Wt 81.2 kg (179 lb)   LMP  (LMP Unknown)   BMI 30.73 kg/m        Allergies   Allergen Reactions    Ergotamine-Caffeine      12-            GI problems-    Seasonal Allergies     Sumatriptan      vomits after giving herself a shot    Compazine Anxiety and Palpitations     Severe anxiety attack    Droperidol Anxiety and Palpitations     Severe anxiety attack    Nubain [Nalbuphine Hcl] Anxiety and Palpitations     Severe anxiety attack    Prochlorperazine Anxiety and Palpitations     Uncontrolled movement, severe anxiety attack       Current Outpatient Medications   Medication Sig Dispense Refill    acetaminophen (TYLENOL) 500 MG tablet Take 1,000 mg by mouth every 6 hours as needed for mild pain      ALPRAZolam (XANAX) 0.5 MG tablet       baclofen (LIORESAL) 20 MG tablet TAKE ONE TABLET BY MOUTH TWICE A  tablet 0    bisacodyl (DULCOLAX) 5 MG EC tablet Take 1 tablet (5 mg) by mouth every other day Take every other day. If no bowel movement for 2 or more days, take 2 tablets of bisacodyl (10 mg) 30 tablet 2    buprenorphine HCl-naloxone HCl (SUBOXONE) 2-0.5 MG per film Place 0.5 Film under the tongue daily       cetirizine (ZYRTEC) 10 MG tablet TAKE ONE TABLET BY MOUTH EVERY MORNING 90 tablet 3    cyclobenzaprine (FLEXERIL) 5 MG tablet TAKE 1-2 TABLETS (5-10 MG) BY MOUTH 3 TIMES DAILY AS NEEDED FOR MUSCLE SPASMS 90 tablet 5    DULoxetine (CYMBALTA) 30 MG capsule Take 30 mg in evening, along with 60 mg in morning. 90 capsule 1    DULoxetine (CYMBALTA) 60 MG capsule Take 1 capsule (60 mg) by mouth every morning 90 capsule 1    famotidine (PEPCID) 40 MG tablet TAKE 1 TABLET (40 MG) BY MOUTH 2 TIMES DAILY 60 tablet 1    gabapentin " (NEURONTIN) 300 MG capsule Take 1 capsule (300 mg) by mouth daily      lactulose (CHRONULAC) 10 GM/15ML solution Take 15 mLs (10 g) by mouth 3 times daily as needed for constipation (as needed for severe constipaiton, no BM for more than 3 days and feeling constipated) 300 mL 3    ondansetron (ZOFRAN ODT) 4 MG ODT tab DISSOLVE ONE TABLET ON TONGUE EVERY SIX HOURS AS NEEDED FOR NAUSEA 20 tablet 1    pantoprazole (PROTONIX) 40 MG EC tablet TAKE ONE TABLET BY MOUTH TWICE A DAY WITH MEALS 60 tablet 5    polyethylene glycol (MIRALAX) 17 GM/Dose powder Take 17 g by mouth daily 578 g 3    rOPINIRole (REQUIP) 1 MG tablet TAKE ONE TABLET BY MOUTH EVERY NIGHT AT BEDTIME 90 tablet 0    simvastatin (ZOCOR) 10 MG tablet TAKE ONE TABLET BY MOUTH EVERY NIGHT AT BEDTIME 90 tablet 0    zolpidem (AMBIEN) 5 MG tablet TAKE ONE TABLET (5 MG) BY MOUTH NIGHTLY AS NEEDED FOR SLEEP 30 tablet 1     No current facility-administered medications for this visit.         Darla Severin-Brown, LPN

## 2024-04-11 NOTE — TELEPHONE ENCOUNTER
Writer faxed signed DMV forms to [place] [employer] Public [employer] at [phone].  Verified delivered by right fax.  Paperwork sent to HIM[employer] for scan.  NAEL Carcamo., LYSSA ([facility])

## 2024-04-11 NOTE — TELEPHONE ENCOUNTER
A user error has taken place: charting done on wrong patient and has been corrected.  GABI Carcamo, LYSSA ([facility])

## 2024-04-11 NOTE — PATIENT INSTRUCTIONS
Linette I am very pleased with your outcome.  Finish up with Edith for the pelvis and try and avoid a lot of injections of your back or SI joints.  When you are finished with her program I will then graduate you to a much more aggressive strengthening program using MedX machines which will flareup your pain for the first couple of weeks when you start that and you need to be brave enough to push through that.  I do think it holds up hope that your future flares of pain will be less often and less severe.  I wish you all the best.
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
## 2024-04-17 ASSESSMENT — ANXIETY QUESTIONNAIRES
7. FEELING AFRAID AS IF SOMETHING AWFUL MIGHT HAPPEN: NEARLY EVERY DAY
IF YOU CHECKED OFF ANY PROBLEMS ON THIS QUESTIONNAIRE, HOW DIFFICULT HAVE THESE PROBLEMS MADE IT FOR YOU TO DO YOUR WORK, TAKE CARE OF THINGS AT HOME, OR GET ALONG WITH OTHER PEOPLE: EXTREMELY DIFFICULT
3. WORRYING TOO MUCH ABOUT DIFFERENT THINGS: NEARLY EVERY DAY
7. FEELING AFRAID AS IF SOMETHING AWFUL MIGHT HAPPEN: NEARLY EVERY DAY
GAD7 TOTAL SCORE: 21
IF YOU CHECKED OFF ANY PROBLEMS ON THIS QUESTIONNAIRE, HOW DIFFICULT HAVE THESE PROBLEMS MADE IT FOR YOU TO DO YOUR WORK, TAKE CARE OF THINGS AT HOME, OR GET ALONG WITH OTHER PEOPLE: EXTREMELY DIFFICULT
1. FEELING NERVOUS, ANXIOUS, OR ON EDGE: NEARLY EVERY DAY
8. IF YOU CHECKED OFF ANY PROBLEMS, HOW DIFFICULT HAVE THESE MADE IT FOR YOU TO DO YOUR WORK, TAKE CARE OF THINGS AT HOME, OR GET ALONG WITH OTHER PEOPLE?: EXTREMELY DIFFICULT
7. FEELING AFRAID AS IF SOMETHING AWFUL MIGHT HAPPEN: NEARLY EVERY DAY
1. FEELING NERVOUS, ANXIOUS, OR ON EDGE: NEARLY EVERY DAY
2. NOT BEING ABLE TO STOP OR CONTROL WORRYING: NEARLY EVERY DAY
4. TROUBLE RELAXING: NEARLY EVERY DAY
6. BECOMING EASILY ANNOYED OR IRRITABLE: NEARLY EVERY DAY
5. BEING SO RESTLESS THAT IT IS HARD TO SIT STILL: NEARLY EVERY DAY
GAD7 TOTAL SCORE: 21
5. BEING SO RESTLESS THAT IT IS HARD TO SIT STILL: NEARLY EVERY DAY
GAD7 TOTAL SCORE: 21
6. BECOMING EASILY ANNOYED OR IRRITABLE: NEARLY EVERY DAY
GAD7 TOTAL SCORE: 21
GAD7 TOTAL SCORE: 21
4. TROUBLE RELAXING: NEARLY EVERY DAY
2. NOT BEING ABLE TO STOP OR CONTROL WORRYING: NEARLY EVERY DAY
7. FEELING AFRAID AS IF SOMETHING AWFUL MIGHT HAPPEN: NEARLY EVERY DAY
GAD7 TOTAL SCORE: 21
3. WORRYING TOO MUCH ABOUT DIFFERENT THINGS: NEARLY EVERY DAY
8. IF YOU CHECKED OFF ANY PROBLEMS, HOW DIFFICULT HAVE THESE MADE IT FOR YOU TO DO YOUR WORK, TAKE CARE OF THINGS AT HOME, OR GET ALONG WITH OTHER PEOPLE?: EXTREMELY DIFFICULT

## 2024-04-17 ASSESSMENT — PATIENT HEALTH QUESTIONNAIRE - PHQ9
SUM OF ALL RESPONSES TO PHQ QUESTIONS 1-9: 24
10. IF YOU CHECKED OFF ANY PROBLEMS, HOW DIFFICULT HAVE THESE PROBLEMS MADE IT FOR YOU TO DO YOUR WORK, TAKE CARE OF THINGS AT HOME, OR GET ALONG WITH OTHER PEOPLE: EXTREMELY DIFFICULT
SUM OF ALL RESPONSES TO PHQ QUESTIONS 1-9: 24

## 2024-04-18 ENCOUNTER — VIRTUAL VISIT (OUTPATIENT)
Dept: PSYCHOLOGY | Facility: CLINIC | Age: 49
End: 2024-04-18
Payer: COMMERCIAL

## 2024-04-18 DIAGNOSIS — F41.1 GENERALIZED ANXIETY DISORDER: ICD-10-CM

## 2024-04-18 DIAGNOSIS — F33.1 MODERATE EPISODE OF RECURRENT MAJOR DEPRESSIVE DISORDER (H): Primary | ICD-10-CM

## 2024-04-18 PROCEDURE — 90837 PSYTX W PT 60 MINUTES: CPT | Mod: 95 | Performed by: COUNSELOR

## 2024-04-18 NOTE — PROGRESS NOTES
Answers submitted by the patient for this visit:  Patient Health Questionnaire (Submitted on 4/17/2024)  If you checked off any problems, how difficult have these problems made it for you to do your work, take care of things at home, or get along with other people?: Extremely difficult  PHQ9 TOTAL SCORE: 24  BO-7 (Submitted on 4/17/2024)  BO 7 TOTAL SCORE: 21          [facility]                                     Progress Note    Patient Name: Radha Beckwith  Date: 4/18/24         Service Type: Individual      Session Start Time: 3:00pm Session End Time: 4:00pm     Session Length: 60    Session #: 32    Attendees: Client    Service Modality:  Video Visit:      Provider verified identity through the following two step process.  Patient provided:  Patient is known previously to provider    Telemedicine Visit: The patient's condition can be safely assessed and treated via synchronous audio and visual telemedicine encounter.      Reason for Telemedicine Visit: Patient convenience (e.g. access to timely appointments / distance to available provider)    Originating Site (Patient Location): Patient's home    Distant Site (Provider Location): Provider Remote Setting- Home Office    Consent:  The patient/guardian has verbally consented to: the potential risks and benefits of telemedicine (video visit) versus in person care; bill my insurance or make self-payment for services provided; and responsibility for payment of non-covered services.     Patient would like the video invitation sent by:  My Chart    Mode of Communication:  Video Conference via [employer]well    Distant Location (Provider):  On-site    As the provider I attest to compliance with applicable laws and regulations related to telemedicine.    DATA  Interactive Complexity: No  Crisis: No        Progress Since Last Session (Related to Symptoms / Goals / Homework):   Symptoms: No change .    Homework: Completed in session      Episode of Care  Goals: Minimal progress - ACTION (Actively working towards change); Intervened by reinforcing change plan / affirming steps taken     Current / Ongoing Stressors and Concerns:   The client stated she met with a psychiatrist. Is now taking buspirone- 15mg , Lamictal- 25mg.   Can't go out in the wind because it triggers migraine headaches. Something about how it goes into her ears. The cold increases her arthritis pain too.   Her son has been calling her nearly everyday now.   Really wants to get off of her suboxone.   Still working on disability paperwork.        Treatment Objective(s) Addressed in This Session:   Increase interest, engagement, and pleasure in doing things  Feel less tired and more energy during the day        Intervention:   Talked about the current symptoms the client is experiencing and the frustrations she's feeling around the. Talked about her recent doctors appointments as well. The client wanted to go through some of the notes to talk about what he wrote. Discussed communication skills with her boyfriend again and her making the time to try to talk to him. Celebrated with the client on how her relationship is with her son currently and how well she's doing creating a safer space for him to be able to talk more with her.           Assessments completed prior to visit:  The following assessments were completed by patient for this visit:  PHQ9:       2/15/2024     1:02 AM 2/22/2024     2:26 PM 2/29/2024     1:46 PM 3/14/2024     2:53 PM 3/27/2024     8:50 PM 4/10/2024     1:59 PM 4/17/2024     1:20 PM   PHQ-9 SCORE   PHQ-9 Total Score MyChart 22 (Severe depression) 22 (Severe depression) 21 (Severe depression) 24 (Severe depression) 22 (Severe depression) 22 (Severe depression) 24 (Severe depression)   PHQ-9 Total Score 22 22 21 24    24 22 22 24     GAD7:       1/2/2024    10:50 AM 1/18/2024     2:56 PM 2/5/2024     6:27 PM 2/19/2024     3:39 PM 3/12/2024     8:32 PM 3/27/2024     8:51 PM  4/17/2024     1:23 PM   BO-7 SCORE   Total Score 20 (severe anxiety) 20 (severe anxiety) 21 (severe anxiety) 19 (severe anxiety) 20 (severe anxiety) 19 (severe anxiety) 21 (severe anxiety)   Total Score 20 20    20 21 19 20 19 21    21     PROMIS 10-Global Health (all questions and answers displayed):       11/26/2023    10:43 PM 2/27/2024     6:40 PM 3/12/2024     8:35 PM 3/27/2024     8:53 PM 4/2/2024     4:38 PM 4/10/2024     1:58 PM 4/17/2024     1:28 PM   PROMIS 10   In general, would you say your health is: Fair Fair Poor Poor Poor Poor Poor   In general, would you say your quality of life is: Poor Fair Poor Poor Poor Poor Poor   In general, how would you rate your physical health? Poor Poor Poor Poor Poor Poor Poor   In general, how would you rate your mental health, including your mood and your ability to think? Poor Fair Poor Poor Poor Poor Poor   In general, how would you rate your satisfaction with your social activities and relationships? Poor Poor Poor Poor Poor Poor Poor   In general, please rate how well you carry out your usual social activities and roles Fair Poor Poor Poor Poor Fair Poor   To what extent are you able to carry out your everyday physical activities such as walking, climbing stairs, carrying groceries, or moving a chair? A little A little A little A little A little A little A little   In the past 7 days, how often have you been bothered by emotional problems such as feeling anxious, depressed, or irritable? Always Always Always Always Always Always Always   In the past 7 days, how would you rate your fatigue on average? Severe Severe Severe Severe Severe Severe Very severe   In the past 7 days, how would you rate your pain on average, where 0 means no pain, and 10 means worst imaginable pain? 7 7 8 7 7 7 7   In general, would you say your health is: 2 2 1 1 1 1 1   In general, would you say your quality of life is: 1 2 1 1 1 1 1   In general, how would you rate your physical health?  1 1 1 1 1 1 1   In general, how would you rate your mental health, including your mood and your ability to think? 1 2 1 1 1 1 1   In general, how would you rate your satisfaction with your social activities and relationships? 1 1 1 1 1 1 1   In general, please rate how well you carry out your usual social activities and roles. (This includes activities at home, at work and in your community, and responsibilities as a parent, child, spouse, employee, friend, etc.) 2 1 1 1 1 2 1   To what extent are you able to carry out your everyday physical activities such as walking, climbing stairs, carrying groceries, or moving a chair? 2 2 2 2 2 2 2   In the past 7 days, how often have you been bothered by emotional problems such as feeling anxious, depressed, or irritable? 5 5 5 5 5 5 5   In the past 7 days, how would you rate your fatigue on average? 4 4 4 4 4 4 5   In the past 7 days, how would you rate your pain on average, where 0 means no pain, and 10 means worst imaginable pain? 7 7 8 7 7 7 7   Global Mental Health Score 4 6 4 4 4 4    4 4    4   Global Physical Health Score 7 7 7 7 7 7    7 6    6   PROMIS TOTAL - SUBSCORES 11 13 11 11 11 11    11 10    10     [place] Suicide Severity Rating Scale (Lifetime/Recent)      10/4/2022    11:00 AM   [place] Suicide Severity Rating (Lifetime/Recent)   Q1 Wished to be Dead (Past Month) no   Q2 Suicidal Thoughts (Past Month) no   Q3 Suicidal Thought Method no   Q4 Suicidal Intent without Specific Plan no   Q5 Suicide Intent with Specific Plan yes   Q6 Suicide Behavior (Lifetime) yes   Within the Past 3 Months? no   Level of Risk per Screen high risk         ASSESSMENT: Current Emotional / Mental Status (status of significant symptoms):   Risk status (Self / Other harm or suicidal ideation)   Patient denies current fears or concerns for personal safety.   Patient denies current or recent suicidal ideation or behaviors.   Patient denies current or recent homicidal ideation or  behaviors.   Patient denies current or recent self injurious behavior or ideation.   Patient denies other safety concerns.   Patient reports there has been no change in risk factors since their last session.     Patient reports there has been no change in protective factors since their last session.     Recommended that patient call 911 or go to the local ED should there be a change in any of these risk factors.     Appearance:   Appropriate    Eye Contact:   Good    Psychomotor Behavior: Normal    Attitude:   Cooperative    Orientation:   All   Speech    Rate / Production: Normal/ Responsive Normal     Volume:  Normal    Mood:    Normal   Affect:    Appropriate    Thought Content:  Clear  Rumination    Thought Form:  Coherent  Tangential    Insight:    Good      Medication Review:   No changes to current psychiatric medication(s)     Medication Compliance:   Yes     Changes in Health Issues:   None reported     Chemical Use Review:   Substance Use: Chemical use reviewed, no active concerns identified      Tobacco Use: No change in amount of tobacco use since last session.  Patient declined discussion at this time    Diagnosis:  1. Moderate episode of recurrent major depressive disorder (H)    2. Generalized anxiety disorder          Collateral Reports Completed:   Not Applicable    PLAN: (Patient Tasks / Therapist Tasks / Other)  Continue to continue to work continuing to build upon her relationship with her son. Continue to work on coping skills and self care skills.        Ricarda Bhatia [facility]                                                         ______________________________________________________________________    Individual Treatment Plan    Patient's Name: Radha Beckwith  YOB: 1975    Date of Creation: 6/28/23  Date Treatment Plan Last Reviewed/Revised: 2/15/24    DSM5 Diagnoses: 296.32 (F33.1) Major Depressive Disorder, Recurrent Episode, Moderate _  Psychosocial / Contextual  Factors: living with boyfriend, memory issues  PROMIS (reviewed every 90 days):     Referral / Collaboration:  Referral to another professional/service is not indicated at this time..    Anticipated number of session for this episode of care: 9-12 sessions  Anticipation frequency of session:  as needed  Anticipated Duration of each session: 38-52 minutes  Treatment plan will be reviewed in 90 days or when goals have been changed.       MeasurableTreatment Goal(s) related to diagnosis / functional impairment(s)  Goal 1: Patient will increase communication skills with family members.    Objective #A (Patient Action)    Patient will learn & utilize at least 2 assertive communication skills weekly.  Status: Continued - Date(s): 2/15/24    Intervention(s)  Therapist will teach emotional regulation skills. distress tolerance skills, interpersonal effectiveness skills, emotion regluation skills, mindfulness skills, radical acceptance. Therapist will teach client how to ID body cues for anxiety, anxiety reduction techniques, how to ID triggers for depression and anxiety- decrease reactivity/eliminate, lifestyle changes to reduce depression and anxiety, communication skills, explore cognitive beliefs and help client to develop healthy cognitive patterns and beliefs.    Objective #B  Patient will use thought-stopping strategy daily to reduce intrusive thoughts.  Status: Continued - Date(s): 2/15/24    Intervention(s)  Therapist will teach emotional regulation skills. distress tolerance skills, interpersonal effectiveness skills, emotion regluation skills, mindfulness skills, radical acceptance. Therapist will teach client how to ID body cues for anxiety, anxiety reduction techniques, how to ID triggers for depression and anxiety- decrease reactivity/eliminate, lifestyle changes to reduce depression and anxiety, communication skills, explore cognitive beliefs and help client to develop healthy cognitive patterns and  beliefs.      Goal 2: Patient will decrease depression symptoms.      Objective #A (Patient Action)    Status: Continued - Date(s): 2/15/24    Patient will Increase interest, engagement, and pleasure in doing things  Decrease frequency and intensity of feeling down, depressed, hopeless  Improve quantity and quality of night time sleep / decrease daytime naps  Feel less tired and more energy during the day   Improve diet, appetite, mindful eating, and / or meal planning  Identify negative self-talk and behaviors: challenge core beliefs, myths, and actions  Improve concentration, focus, and mindfulness in daily activities   Feel less fidgety, restless or slow in daily activities / interpersonal interactions.    Intervention(s)  Therapist will teach emotional regulation skills. distress tolerance skills, interpersonal effectiveness skills, emotion regluation skills, mindfulness skills, radical acceptance. Therapist will teach client how to ID body cues for anxiety, anxiety reduction techniques, how to ID triggers for depression and anxiety- decrease reactivity/eliminate, lifestyle changes to reduce depression and anxiety, communication skills, explore cognitive beliefs and help client to develop healthy cognitive patterns and beliefs.    Objective #B  Patient will identify three distraction and diversion activities and use those activities to decrease level of anxiety  .    Status: Continued - Date(s):  2/15/24    Intervention(s)  Therapist will teach emotional regulation skills. distress tolerance skills, interpersonal effectiveness skills, emotion regluation skills, mindfulness skills, radical acceptance. Therapist will teach client how to ID body cues for anxiety, anxiety reduction techniques, how to ID triggers for depression and anxiety- decrease reactivity/eliminate, lifestyle changes to reduce depression and anxiety, communication skills, explore cognitive beliefs and help client to develop healthy cognitive  patterns and beliefs.      Patient has reviewed and agreed to the above plan.      Ricarda Bhatia, [facility]

## 2024-04-19 ENCOUNTER — LAB (OUTPATIENT)
Dept: LAB | Facility: CLINIC | Age: 49
End: 2024-04-19
Payer: COMMERCIAL

## 2024-04-19 ENCOUNTER — HOSPITAL ENCOUNTER (OUTPATIENT)
Facility: CLINIC | Age: 49
Discharge: HOME | End: 2024-04-19
Attending: ANESTHESIOLOGY | Admitting: ANESTHESIOLOGY
Payer: COMMERCIAL

## 2024-04-19 ENCOUNTER — HOSPITAL ENCOUNTER (OUTPATIENT)
Dept: GENERAL RADIOLOGY | Facility: CLINIC | Age: 49
Discharge: HOME | End: 2024-04-19
Attending: ANESTHESIOLOGY | Admitting: ANESTHESIOLOGY
Payer: COMMERCIAL

## 2024-04-19 ENCOUNTER — OFFICE VISIT (OUTPATIENT)
Dept: AUDIOLOGY | Facility: CLINIC | Age: 49
End: 2024-04-19
Payer: COMMERCIAL

## 2024-04-19 VITALS
WEIGHT: 176 LBS | SYSTOLIC BLOOD PRESSURE: 114 MMHG | RESPIRATION RATE: 18 BRPM | HEART RATE: 86 BPM | DIASTOLIC BLOOD PRESSURE: 79 MMHG | OXYGEN SATURATION: 94 % | TEMPERATURE: 98.9 F | BODY MASS INDEX: 30.21 KG/M2

## 2024-04-19 DIAGNOSIS — M54.2 CERVICALGIA: ICD-10-CM

## 2024-04-19 DIAGNOSIS — H90.3 BILATERAL SENSORINEURAL HEARING LOSS: Primary | ICD-10-CM

## 2024-04-19 DIAGNOSIS — F11.21 OPIOID DEPENDENCE IN REMISSION (H): ICD-10-CM

## 2024-04-19 PROCEDURE — 64479 NJX AA&/STRD TFRM EPI C/T 1: CPT | Performed by: ANESTHESIOLOGY

## 2024-04-19 PROCEDURE — 999N000179 XR SURGERY CARM FLUORO LESS THAN 5 MIN W STILLS

## 2024-04-19 PROCEDURE — 250N000011 HC RX IP 250 OP 636: Performed by: ANESTHESIOLOGY

## 2024-04-19 PROCEDURE — G0481 DRUG TEST DEF 8-14 CLASSES: HCPCS

## 2024-04-19 PROCEDURE — 80307 DRUG TEST PRSMV CHEM ANLYZR: CPT

## 2024-04-19 PROCEDURE — 250N000011 HC RX IP 250 OP 636: Mod: JZ | Performed by: ANESTHESIOLOGY

## 2024-04-19 PROCEDURE — 250N000009 HC RX 250: Performed by: NURSE ANESTHETIST, CERTIFIED REGISTERED

## 2024-04-19 PROCEDURE — 92593 PR HEARING AID CHECK, BINAURAL: CPT | Performed by: AUDIOLOGIST

## 2024-04-19 PROCEDURE — 82570 ASSAY OF URINE CREATININE: CPT

## 2024-04-19 PROCEDURE — 62321 NJX INTERLAMINAR CRV/THRC: CPT | Performed by: ANESTHESIOLOGY

## 2024-04-19 RX ORDER — IOPAMIDOL 612 MG/ML
INJECTION, SOLUTION INTRATHECAL PRN
Status: DISCONTINUED | OUTPATIENT
Start: 2024-04-19 | End: 2024-04-19 | Stop reason: HOSPADM

## 2024-04-19 RX ORDER — TRIAMCINOLONE ACETONIDE 40 MG/ML
INJECTION, SUSPENSION INTRA-ARTICULAR; INTRAMUSCULAR PRN
Status: DISCONTINUED | OUTPATIENT
Start: 2024-04-19 | End: 2024-04-19 | Stop reason: HOSPADM

## 2024-04-19 RX ADMIN — LIDOCAINE HYDROCHLORIDE 1 ML: 10 INJECTION, SOLUTION EPIDURAL; INFILTRATION; INTRACAUDAL; PERINEURAL at 13:08

## 2024-04-19 ASSESSMENT — ACTIVITIES OF DAILY LIVING (ADL)
ADLS_ACUITY_SCORE: 35
ADLS_ACUITY_SCORE: 35

## 2024-04-19 NOTE — DISCHARGE INSTRUCTIONS

## 2024-04-19 NOTE — OP NOTE
CHIEF COMPLAINT: Neck pain with radicular arm pains     INDICATIONS FOR PROCEDURE:   1.This patient suffers from moderate to severe neck pain and upper extremity radicular pains.    2.This pain has persisted for more than 4 weeks and is causing significant functional disability when they are trying to perform ADL's.   3. They failed conservative care which consisted of giving this pain time to gisele, PT, medications  4. Preoperative NRS pain score was verbally reported to me today as a  7/10.   5. The patients radicular pains correlates to their MRI which shows bilateral C4-5 foraminal stenosis mild to moderate.  6.  An order was sent to me to perform the technical component of a cervical epidural steroid injection.     PROCEDURE: C6-7 Interlaminar epidural steroid injection using fluoroscopic guidance with contrast dye.      PROCEDURE DETAILS: After written informed consent was obtained from the patient, the patient was escorted to the procedure room.  The patient was placed in the prone position.  A  time out  was conducted to verify patient identity, procedure to be performed, side, site, allergies and any special requirements.  The skin over the neck and upper back region were prepped and draped in normal sterile fashion utilizing ChloraPrep.  Fluoroscopy was used to identify the C6-7 interspace in an AP view and the skin was anesthetized with 2 mL of 1% lidocaine with bicarbonate buffer.  A 20-gauge 3-1/2 inch Tuohy needle was advanced using the loss of resistance technique with preservative free normal saline with fluoroscopic guidance. After negative aspiration for CSF and blood, 1.5 cc of Isovue contrast dye was injected revealing the appropriate cervical epidurogram without evidence of intrathecal or intravascular spread. Following this, a 3-mL solution of 40 mg of triamcinolone with 2 cc preservative-free normal saline was slowly injected.  There is good spread of medication covering up to the C4-5 level  bilaterally.  After injection of the medication, as the needle tip was withdrawn, it was flushed with local anesthetic.  The patient was monitored with blood pressure and pulse oximetry machines with the assistance of an RN throughout the procedure.  The patient was alert and responsive to questions throughout the procedure.   The patient tolerated the procedure well and was observed in the post-procedural area.  The patient was dismissed without apparent complications.      BLOOD LOSS: less than 5 cc     DIAGNOSIS:  1.  Neck pain and bilateral shoulder pain secondary to bilateral C4-5 foraminal stenosis     PLAN:     1. Performed a C6-7 interlaminar epidural steroid injection without complications.   2. The patient was instructed follow-up with the referring provider and to call the [facility] if today's procedure is not helpful.  If this is not helpful I recommend that she has medial branch blocks at C5-6 and C6-7.     Trevor Ivory MD  Diplomate of the American Board of Anesthesiology, Pain Medicine

## 2024-04-19 NOTE — PROGRESS NOTES
AUDIOLOGY REPORT     SUBJECTIVE:  Radha Beckwith is a 48 year old female who was seen in the Audiology Clinic at the [facility] on 4/19/2024 for a hearing aid check. Previous results have revealed normal sloping to moderate sensorineural hearing loss bilaterally. The patient was fit with binaural Signia Pure Charge & Go 5 AX RICs on 7/28/2022.  Radha notes continued issues with tinnitus, but is also struggling with neck and spine issues. She had neck injections today. She notes inconsistent use of her hearing aids.     OBJECTIVE:   Hearing aid maintenance completed. Clean battery contacts on the hearing aids and . Cleaned edison ports. Replaced small tulip domes, replaced wax filters. Biologic listening check revealed appropriate function.     No charge visit today (in warranty hearing aid check).     ASSESSMENT:   A follow-up appointment for hearing aid fitting was completed today. Changes to hearing aid was completed as outlined above.      PLAN:  Recommended consistent use of hearing aids, discussed importance of 10+  hours of use per day to optimize performance, this also may help reduce awareness of tinnitus. Scheduled follow up in 6 months to include a hearing test and hearing aid programming. Please call this clinic with any questions regarding today s appointment.     Vivienne Rodriges.  [place] Licensed Audiologist #[id number]

## 2024-04-20 LAB
AMPHETAMINES UR QL SCN: ABNORMAL
BARBITURATES UR QL SCN: ABNORMAL
BENZODIAZ UR QL SCN: ABNORMAL
BUPRENORPHINE UR QL: ABNORMAL
BZE UR QL SCN: ABNORMAL
CANNABINOIDS UR QL SCN: ABNORMAL
CREAT UR-MCNC: 162 MG/DL
ETHANOL UR QL SCN: NORMAL
FENTANYL UR QL: ABNORMAL
METHADONE UR QL SCN: NORMAL
OPIATES UR QL SCN: ABNORMAL
OXYCODONE UR QL: NORMAL
PCP QUAL URINE (ROCHE): ABNORMAL

## 2024-04-22 ENCOUNTER — VIRTUAL VISIT (OUTPATIENT)
Dept: PSYCHOLOGY | Facility: CLINIC | Age: 49
End: 2024-04-22
Payer: COMMERCIAL

## 2024-04-22 ENCOUNTER — THERAPY VISIT (OUTPATIENT)
Dept: PHYSICAL THERAPY | Facility: CLINIC | Age: 49
End: 2024-04-22
Attending: PREVENTIVE MEDICINE
Payer: COMMERCIAL

## 2024-04-22 DIAGNOSIS — F33.1 MODERATE EPISODE OF RECURRENT MAJOR DEPRESSIVE DISORDER (H): Primary | ICD-10-CM

## 2024-04-22 DIAGNOSIS — E78.2 MIXED HYPERLIPIDEMIA: ICD-10-CM

## 2024-04-22 DIAGNOSIS — F41.1 GENERALIZED ANXIETY DISORDER: ICD-10-CM

## 2024-04-22 DIAGNOSIS — M54.2 CERVICALGIA: Primary | ICD-10-CM

## 2024-04-22 PROCEDURE — 90837 PSYTX W PT 60 MINUTES: CPT | Mod: 95 | Performed by: COUNSELOR

## 2024-04-22 PROCEDURE — 97110 THERAPEUTIC EXERCISES: CPT | Mod: GP | Performed by: PHYSICAL THERAPIST

## 2024-04-22 RX ORDER — SIMVASTATIN 10 MG
10 TABLET ORAL AT BEDTIME
Qty: 90 TABLET | Refills: 0 | Status: SHIPPED | OUTPATIENT
Start: 2024-04-22 | End: 2024-07-23

## 2024-04-26 DIAGNOSIS — K59.03 CONSTIPATION DUE TO OPIOID THERAPY: ICD-10-CM

## 2024-04-26 DIAGNOSIS — K21.00 GASTROESOPHAGEAL REFLUX DISEASE WITH ESOPHAGITIS WITHOUT HEMORRHAGE: ICD-10-CM

## 2024-04-26 DIAGNOSIS — T40.2X5A CONSTIPATION DUE TO OPIOID THERAPY: ICD-10-CM

## 2024-04-26 RX ORDER — BISACODYL 5 MG/1
5 TABLET, DELAYED RELEASE ORAL EVERY OTHER DAY
Qty: 30 TABLET | Refills: 2 | Status: SHIPPED | OUTPATIENT
Start: 2024-04-26

## 2024-04-29 DIAGNOSIS — K59.03 CONSTIPATION DUE TO OPIOID THERAPY: ICD-10-CM

## 2024-04-29 DIAGNOSIS — T40.2X5A CONSTIPATION DUE TO OPIOID THERAPY: ICD-10-CM

## 2024-04-29 RX ORDER — LACTULOSE 10 G/15ML
SOLUTION ORAL
Qty: 300 ML | Refills: 3 | Status: SHIPPED | OUTPATIENT
Start: 2024-04-29

## 2024-04-29 NOTE — TELEPHONE ENCOUNTER
Routing refill request to provider for review/approval because:  Drug not on the [facility] refill protocol     Juana Junior RN

## 2024-05-02 ENCOUNTER — VIRTUAL VISIT (OUTPATIENT)
Dept: PSYCHOLOGY | Facility: CLINIC | Age: 49
End: 2024-05-02
Payer: COMMERCIAL

## 2024-05-02 DIAGNOSIS — F41.1 GENERALIZED ANXIETY DISORDER: ICD-10-CM

## 2024-05-02 DIAGNOSIS — F33.1 MODERATE EPISODE OF RECURRENT MAJOR DEPRESSIVE DISORDER (H): Primary | ICD-10-CM

## 2024-05-02 PROCEDURE — 90834 PSYTX W PT 45 MINUTES: CPT | Mod: 95 | Performed by: COUNSELOR

## 2024-05-02 ASSESSMENT — ANXIETY QUESTIONNAIRES
IF YOU CHECKED OFF ANY PROBLEMS ON THIS QUESTIONNAIRE, HOW DIFFICULT HAVE THESE PROBLEMS MADE IT FOR YOU TO DO YOUR WORK, TAKE CARE OF THINGS AT HOME, OR GET ALONG WITH OTHER PEOPLE: EXTREMELY DIFFICULT
3. WORRYING TOO MUCH ABOUT DIFFERENT THINGS: NEARLY EVERY DAY
8. IF YOU CHECKED OFF ANY PROBLEMS, HOW DIFFICULT HAVE THESE MADE IT FOR YOU TO DO YOUR WORK, TAKE CARE OF THINGS AT HOME, OR GET ALONG WITH OTHER PEOPLE?: EXTREMELY DIFFICULT
GAD7 TOTAL SCORE: 21
6. BECOMING EASILY ANNOYED OR IRRITABLE: NEARLY EVERY DAY
GAD7 TOTAL SCORE: 21
8. IF YOU CHECKED OFF ANY PROBLEMS, HOW DIFFICULT HAVE THESE MADE IT FOR YOU TO DO YOUR WORK, TAKE CARE OF THINGS AT HOME, OR GET ALONG WITH OTHER PEOPLE?: EXTREMELY DIFFICULT
7. FEELING AFRAID AS IF SOMETHING AWFUL MIGHT HAPPEN: NEARLY EVERY DAY
GAD7 TOTAL SCORE: 21
6. BECOMING EASILY ANNOYED OR IRRITABLE: NEARLY EVERY DAY
GAD7 TOTAL SCORE: 21
5. BEING SO RESTLESS THAT IT IS HARD TO SIT STILL: NEARLY EVERY DAY
1. FEELING NERVOUS, ANXIOUS, OR ON EDGE: NEARLY EVERY DAY
GAD7 TOTAL SCORE: 21
2. NOT BEING ABLE TO STOP OR CONTROL WORRYING: NEARLY EVERY DAY
4. TROUBLE RELAXING: NEARLY EVERY DAY
1. FEELING NERVOUS, ANXIOUS, OR ON EDGE: NEARLY EVERY DAY
7. FEELING AFRAID AS IF SOMETHING AWFUL MIGHT HAPPEN: NEARLY EVERY DAY
4. TROUBLE RELAXING: NEARLY EVERY DAY
GAD7 TOTAL SCORE: 21
7. FEELING AFRAID AS IF SOMETHING AWFUL MIGHT HAPPEN: NEARLY EVERY DAY
3. WORRYING TOO MUCH ABOUT DIFFERENT THINGS: NEARLY EVERY DAY
IF YOU CHECKED OFF ANY PROBLEMS ON THIS QUESTIONNAIRE, HOW DIFFICULT HAVE THESE PROBLEMS MADE IT FOR YOU TO DO YOUR WORK, TAKE CARE OF THINGS AT HOME, OR GET ALONG WITH OTHER PEOPLE: EXTREMELY DIFFICULT
7. FEELING AFRAID AS IF SOMETHING AWFUL MIGHT HAPPEN: NEARLY EVERY DAY
2. NOT BEING ABLE TO STOP OR CONTROL WORRYING: NEARLY EVERY DAY
5. BEING SO RESTLESS THAT IT IS HARD TO SIT STILL: NEARLY EVERY DAY

## 2024-05-02 NOTE — PROGRESS NOTES
Answers submitted by the patient for this visit:  Patient Health Questionnaire (Submitted on 5/2/2024)  If you checked off any problems, how difficult have these problems made it for you to do your work, take care of things at home, or get along with other people?: Extremely difficult  PHQ9 TOTAL SCORE: 24  BO-7 (Submitted on 5/2/2024)  BO 7 TOTAL SCORE: 21          [facility]                                     Progress Note    Patient Name: Radha Beckwith  Date: 5/2/24         Service Type: Individual      Session Start Time: 2:00pm Session End Time: 2:50pm     Session Length: 50     Session #: 34    Attendees: Client    Service Modality:  Video Visit:      Provider verified identity through the following two step process.  Patient provided:  Patient is known previously to provider    Telemedicine Visit: The patient's condition can be safely assessed and treated via synchronous audio and visual telemedicine encounter.      Reason for Telemedicine Visit: Patient convenience (e.g. access to timely appointments / distance to available provider)    Originating Site (Patient Location): Patient's home    Distant Site (Provider Location): Provider Remote Setting- Home Office    Consent:  The patient/guardian has verbally consented to: the potential risks and benefits of telemedicine (video visit) versus in person care; bill my insurance or make self-payment for services provided; and responsibility for payment of non-covered services.     Patient would like the video invitation sent by:  My Chart    Mode of Communication:  Video Conference via [facility]well    Distant Location (Provider):  On-site    As the provider I attest to compliance with applicable laws and regulations related to telemedicine.    DATA  Interactive Complexity: No  Crisis: No        Progress Since Last Session (Related to Symptoms / Goals / Homework):   Symptoms: No change .    Homework: Completed in session      Episode of Care  Goals: Satisfactory progress - MAINTENANCE (Working to maintain change, with risk of relapse); Intervened by continuing to positively reinforce healthy behavior choice      Current / Ongoing Stressors and Concerns:   The client stated she has her second psychiatry appointment and it went well.   Her son is in [place] for training right now so she hasn't gotten to talk with him for while and is a little worried about him.         Treatment Objective(s) Addressed in This Session:   Increase interest, engagement, and pleasure in doing things  Feel less tired and more energy during the day        Intervention:   The client seems to be doing well lately. She stated she still wants to work on her relationship with her boyfriend. Continues to try to talk to him but isn't effective. Stated he is back to his usual pattern of taking a nap when he gets home and then drinking to intoxication. Isn't sure is she wants to stay in the relationship but will think more about that later once she's got all the disability things figured out.         Assessments completed prior to visit:  The following assessments were completed by patient for this visit:  PHQ9:       2/29/2024     1:46 PM 3/14/2024     2:53 PM 3/27/2024     8:50 PM 4/10/2024     1:59 PM 4/17/2024     1:20 PM 4/22/2024     3:31 PM 5/2/2024     1:10 PM   PHQ-9 SCORE   PHQ-9 Total Score MyChart 21 (Severe depression) 24 (Severe depression) 22 (Severe depression) 22 (Severe depression) 24 (Severe depression) 24 (Severe depression) 24 (Severe depression)   PHQ-9 Total Score 21 24    24 22 22 24 24 24     GAD7:       1/18/2024     2:56 PM 2/5/2024     6:27 PM 2/19/2024     3:39 PM 3/12/2024     8:32 PM 3/27/2024     8:51 PM 4/17/2024     1:23 PM 5/2/2024     1:11 PM   BO-7 SCORE   Total Score 20 (severe anxiety) 21 (severe anxiety) 19 (severe anxiety) 20 (severe anxiety) 19 (severe anxiety) 21 (severe anxiety) 21 (severe anxiety)   Total Score 20    20 21 19 20 19 21    21  21    21     PROMIS 10-Global Health (all questions and answers displayed):       11/26/2023    10:43 PM 2/27/2024     6:40 PM 3/12/2024     8:35 PM 3/27/2024     8:53 PM 4/2/2024     4:38 PM 4/10/2024     1:58 PM 4/17/2024     1:28 PM   PROMIS 10   In general, would you say your health is: Fair Fair Poor Poor Poor Poor Poor   In general, would you say your quality of life is: Poor Fair Poor Poor Poor Poor Poor   In general, how would you rate your physical health? Poor Poor Poor Poor Poor Poor Poor   In general, how would you rate your mental health, including your mood and your ability to think? Poor Fair Poor Poor Poor Poor Poor   In general, how would you rate your satisfaction with your social activities and relationships? Poor Poor Poor Poor Poor Poor Poor   In general, please rate how well you carry out your usual social activities and roles Fair Poor Poor Poor Poor Fair Poor   To what extent are you able to carry out your everyday physical activities such as walking, climbing stairs, carrying groceries, or moving a chair? A little A little A little A little A little A little A little   In the past 7 days, how often have you been bothered by emotional problems such as feeling anxious, depressed, or irritable? Always Always Always Always Always Always Always   In the past 7 days, how would you rate your fatigue on average? Severe Severe Severe Severe Severe Severe Very severe   In the past 7 days, how would you rate your pain on average, where 0 means no pain, and 10 means worst imaginable pain? 7 7 8 7 7 7 7   In general, would you say your health is: 2 2 1 1 1 1 1   In general, would you say your quality of life is: 1 2 1 1 1 1 1   In general, how would you rate your physical health? 1 1 1 1 1 1 1   In general, how would you rate your mental health, including your mood and your ability to think? 1 2 1 1 1 1 1   In general, how would you rate your satisfaction with your social activities and relationships? 1  1 1 1 1 1 1   In general, please rate how well you carry out your usual social activities and roles. (This includes activities at home, at work and in your community, and responsibilities as a parent, child, spouse, employee, friend, etc.) 2 1 1 1 1 2 1   To what extent are you able to carry out your everyday physical activities such as walking, climbing stairs, carrying groceries, or moving a chair? 2 2 2 2 2 2 2   In the past 7 days, how often have you been bothered by emotional problems such as feeling anxious, depressed, or irritable? 5 5 5 5 5 5 5   In the past 7 days, how would you rate your fatigue on average? 4 4 4 4 4 4 5   In the past 7 days, how would you rate your pain on average, where 0 means no pain, and 10 means worst imaginable pain? 7 7 8 7 7 7 7   Global Mental Health Score 4 6 4 4 4 4    4 4    4   Global Physical Health Score 7 7 7 7 7 7    7 6    6   PROMIS TOTAL - SUBSCORES 11 13 11 11 11 11    11 10    10     [place] Suicide Severity Rating Scale (Lifetime/Recent)      10/4/2022    11:00 AM   [place] Suicide Severity Rating (Lifetime/Recent)   Q1 Wished to be Dead (Past Month) no   Q2 Suicidal Thoughts (Past Month) no   Q3 Suicidal Thought Method no   Q4 Suicidal Intent without Specific Plan no   Q5 Suicide Intent with Specific Plan yes   Q6 Suicide Behavior (Lifetime) yes   Within the Past 3 Months? no   Level of Risk per Screen high risk         ASSESSMENT: Current Emotional / Mental Status (status of significant symptoms):   Risk status (Self / Other harm or suicidal ideation)   Patient denies current fears or concerns for personal safety.   Patient denies current or recent suicidal ideation or behaviors.   Patient denies current or recent homicidal ideation or behaviors.   Patient denies current or recent self injurious behavior or ideation.   Patient denies other safety concerns.   Patient reports there has been no change in risk factors since their last session.     Patient reports  there has been no change in protective factors since their last session.     Recommended that patient call 911 or go to the local ED should there be a change in any of these risk factors.     Appearance:   Appropriate    Eye Contact:   Good    Psychomotor Behavior: Normal    Attitude:   Cooperative    Orientation:   All   Speech    Rate / Production: Normal/ Responsive Normal     Volume:  Normal    Mood:    Normal   Affect:    Appropriate    Thought Content:  Clear  Rumination    Thought Form:  Coherent    Insight:    Good      Medication Review:   No changes to current psychiatric medication(s)     Medication Compliance:   Yes     Changes in Health Issues:   None reported     Chemical Use Review:   Substance Use: Chemical use reviewed, no active concerns identified      Tobacco Use: No change in amount of tobacco use since last session.  Patient declined discussion at this time    Diagnosis:  1. Moderate episode of recurrent major depressive disorder (H)    2. Generalized anxiety disorder          Collateral Reports Completed:   Not Applicable    PLAN: (Patient Tasks / Therapist Tasks / Other)  Continue to continue to work on emotion regulation skills and communication skills.         Ricarda Bhatia [facility]                                                         ______________________________________________________________________    Individual Treatment Plan    Patient's Name: Radha Beckwith  YOB: 1975    Date of Creation: 6/28/23  Date Treatment Plan Last Reviewed/Revised: 2/15/24    DSM5 Diagnoses: 296.32 (F33.1) Major Depressive Disorder, Recurrent Episode, Moderate _  Psychosocial / Contextual Factors: living with boyfriend, memory issues  PROMIS (reviewed every 90 days):     Referral / Collaboration:  Referral to another professional/service is not indicated at this time..    Anticipated number of session for this episode of care: 9-12 sessions  Anticipation frequency of session:  as  needed  Anticipated Duration of each session: 38-52 minutes  Treatment plan will be reviewed in 90 days or when goals have been changed.       MeasurableTreatment Goal(s) related to diagnosis / functional impairment(s)  Goal 1: Patient will increase communication skills with family members.    Objective #A (Patient Action)    Patient will learn & utilize at least 2 assertive communication skills weekly.  Status: Continued - Date(s): 2/15/24    Intervention(s)  Therapist will teach emotional regulation skills. distress tolerance skills, interpersonal effectiveness skills, emotion regluation skills, mindfulness skills, radical acceptance. Therapist will teach client how to ID body cues for anxiety, anxiety reduction techniques, how to ID triggers for depression and anxiety- decrease reactivity/eliminate, lifestyle changes to reduce depression and anxiety, communication skills, explore cognitive beliefs and help client to develop healthy cognitive patterns and beliefs.    Objective #B  Patient will use thought-stopping strategy daily to reduce intrusive thoughts.  Status: Continued - Date(s): 2/15/24    Intervention(s)  Therapist will teach emotional regulation skills. distress tolerance skills, interpersonal effectiveness skills, emotion regluation skills, mindfulness skills, radical acceptance. Therapist will teach client how to ID body cues for anxiety, anxiety reduction techniques, how to ID triggers for depression and anxiety- decrease reactivity/eliminate, lifestyle changes to reduce depression and anxiety, communication skills, explore cognitive beliefs and help client to develop healthy cognitive patterns and beliefs.      Goal 2: Patient will decrease depression symptoms.      Objective #A (Patient Action)    Status: Continued - Date(s): 2/15/24    Patient will Increase interest, engagement, and pleasure in doing things  Decrease frequency and intensity of feeling down, depressed, hopeless  Improve quantity  and quality of night time sleep / decrease daytime naps  Feel less tired and more energy during the day   Improve diet, appetite, mindful eating, and / or meal planning  Identify negative self-talk and behaviors: challenge core beliefs, myths, and actions  Improve concentration, focus, and mindfulness in daily activities   Feel less fidgety, restless or slow in daily activities / interpersonal interactions.    Intervention(s)  Therapist will teach emotional regulation skills. distress tolerance skills, interpersonal effectiveness skills, emotion regluation skills, mindfulness skills, radical acceptance. Therapist will teach client how to ID body cues for anxiety, anxiety reduction techniques, how to ID triggers for depression and anxiety- decrease reactivity/eliminate, lifestyle changes to reduce depression and anxiety, communication skills, explore cognitive beliefs and help client to develop healthy cognitive patterns and beliefs.    Objective #B  Patient will identify three distraction and diversion activities and use those activities to decrease level of anxiety  .    Status: Continued - Date(s):  2/15/24    Intervention(s)  Therapist will teach emotional regulation skills. distress tolerance skills, interpersonal effectiveness skills, emotion regluation skills, mindfulness skills, radical acceptance. Therapist will teach client how to ID body cues for anxiety, anxiety reduction techniques, how to ID triggers for depression and anxiety- decrease reactivity/eliminate, lifestyle changes to reduce depression and anxiety, communication skills, explore cognitive beliefs and help client to develop healthy cognitive patterns and beliefs.      Patient has reviewed and agreed to the above plan.      Ricarda Bhatia [facility]

## 2024-05-03 ENCOUNTER — THERAPY VISIT (OUTPATIENT)
Dept: PHYSICAL THERAPY | Facility: CLINIC | Age: 49
End: 2024-05-03
Attending: PREVENTIVE MEDICINE
Payer: COMMERCIAL

## 2024-05-03 DIAGNOSIS — M54.2 CERVICALGIA: Primary | ICD-10-CM

## 2024-05-03 PROCEDURE — 97140 MANUAL THERAPY 1/> REGIONS: CPT | Mod: GP | Performed by: PHYSICAL THERAPIST

## 2024-05-07 ENCOUNTER — MYC MEDICAL ADVICE (OUTPATIENT)
Dept: GASTROENTEROLOGY | Facility: CLINIC | Age: 49
End: 2024-05-07
Payer: COMMERCIAL

## 2024-05-07 NOTE — PROGRESS NOTES
GASTROENTEROLOGY RETURN PATIENT VIRTUAL VISIT      How would you like to obtain your AVS? MyChart  If the video visit is dropped, the invitation should be resent by: Text to cell phone: [phone]  Will anyone else be joining your video visit? No    Video-Visit Details     Type of service:  Video Visit     Video Start Time (time video started): 1234     Video End Time (time video stopped): 1258     Physician has received verbal consent for a Video Visit from the patient? Yes     Originating Location (pt. Location): Home    Distant Location (provider location):  Off-site    Platform used for Video Visit: [facility]    GASTROENTEROLOGY RETURN PATIENT VIDEO VISIT    CC/REFERRING MD:    Gisselle Linn  Referred Self  REASON FOR CONSULTATION:   Referred for Follow Up (Constipation due to opioid therapy/Gastroesophageal reflux disease with esophagitis without hemorrhage/)      HISTORY OF PRESENT ILLNESS:  Radha Beckwith is 48 year old female who presents to GI clinic for a follow up. Patient was seen for acid reflux and drug induced constipation.     Reported  that she stopped famotidine as she did not notice benefits of medication on her acid reflux. Was not able to tolerate Carafate due to increase in abdominal pain per patient's report.  Higher dose of pantoprazole, 40 mg twice a day, helps. Said that she sleeps in the recliner. If she falls asleep in bed, she wakes up from burning in her throat and acid regurgitation.   Patient was seen at the ER yesterday for atypical chest pain. Stated that she sat down after vacuuming and had a snack. Suddenly, developed burning pain that radiated to her jaw and neck. Later, felt numbness in her left arm. Was nauseated. Did not have emesis.  Had negative cardiac workup.Was advised to see GI for poorly controlled acid reflux.    Patient reported occasional nausea, but no vomiting. Uses Zofran as needed.No weight loss. Stated that her stools are sticky and soft after she  takes bisacodyl. Said that she takes 3 tablets of bisacodyl every 3-5 days for constipation. No hematochezia or melena.    PREVIOUS ENDOSCOPY:  10/3/2023 EGD  Findings:       The Z-line was regular and was found 37 cm from the incisors.        No endoscopic abnormality was evident in the esophagus to explain the        patient's complaint of dysphagia. Biopsies were taken proximal and        distal with a cold forceps for histology.        The stomach was normal.        The examined duodenum was normal.   Final Diagnosis   Esophagus, proximal and distal, biopsies:  --No significant histopathologic change.         PERTINENT IMAGING STUDIES WERE REVIEWED IN EMR  5/13/24 Chest x-ray  IMPRESSION: No acute cardiopulmonary abnormality.      Stable appearance of 3 mm nodular density along the right mid lung.  Findings may reflect a small pulmonary nodule or calcified granuloma.  If further characterization is needed, consider nonemergent follow-up  CT chest exam.    HISTORY:   has a past medical history of Abdominal pain, right lower quadrant (03/09/2008), Anxiety attack (07/31/2015), Atypical chest pain (06/23/2015), De Quervain's disease (tenosynovitis), Dehydration, Depressive disorder (1996), Gastric ulcer (07/31/2015), GERD (gastroesophageal reflux disease) (07/28/2010), Hypertension (2017), Ingrowing nail (01/09/2014), Migraines, Opioid dependence in remission (H) (08/02/2015), Other and unspecified ovarian cyst, and Papanicolaou smear of cervix with low grade squamous intraepithelial lesion (LGSIL) (07/07/2017).     has a past surgical history that includes UPPER GI ENDOSCOPY,EXAM (01/07/08); Esophagoscopy, gastroscopy, duodenoscopy (EGD), combined (N/A, 4/17/2017); Colposcopy, biopsy, combined (N/A, 8/10/2017); Biopsy cervical, local excision, single/multiple (N/A, 8/10/2017); Exam under anesthesia pelvic (N/A, 8/10/2017); Laparoscopic hysterectomy total (N/A, 10/30/2017); Hysterectomy; Laparoscopic cholecystectomy  (N/A, 2/2/2018); hysterectomy, pap no longer indicated; Inject epidural lumbar (Bilateral, 7/1/2022); Colonoscopy (N/A, 1/6/2023); Esophagoscopy, gastroscopy, duodenoscopy (EGD), combined (N/A, 1/6/2023); Esophagoscopy, gastroscopy, duodenoscopy (EGD), combined (N/A, 10/3/2023); Inject epidural cervical (N/A, 10/20/2023); and Inject epidural cervical (N/A, 4/19/2024).     reports that she has been smoking cigarettes. She has a 5 pack-year smoking history. She has never been exposed to tobacco smoke. She has never used smokeless tobacco. She reports current alcohol use. She reports that she does not use drugs.    family history includes Adrenal Disorder in an other family member; Anxiety Disorder in her mother; Asthma in her brother; Breast Cancer in her cousin; Cerebrovascular Disease in her father; Chronic Obstructive Pulmonary Disease in her mother and another family member; Depression in her mother; Hypertension in her mother; Respiratory in her mother.    ALLERGIES:     Allergies   Allergen Reactions    Ergotamine-Caffeine      12-            GI problems-    Seasonal Allergies     Sumatriptan      vomits after giving herself a shot    Compazine Anxiety and Palpitations     Severe anxiety attack    Droperidol Anxiety and Palpitations     Severe anxiety attack    Nubain [Nalbuphine Hcl] Anxiety and Palpitations     Severe anxiety attack    Prochlorperazine Anxiety and Palpitations     Uncontrolled movement, severe anxiety attack       PERTINENT MEDICATIONS:    Current Outpatient Medications:     acetaminophen (TYLENOL) 500 MG tablet, Take 1,000 mg by mouth every 6 hours as needed for mild pain, Disp: , Rfl:     ALPRAZolam (XANAX) 0.5 MG tablet, , Disp: , Rfl:     baclofen (LIORESAL) 20 MG tablet, TAKE ONE TABLET BY MOUTH TWICE A DAY, Disp: 180 tablet, Rfl: 0    bisacodyl (DULCOLAX) 5 MG EC tablet, Take 1 tablet (5 mg) by mouth every other day Take every other day. If no bowel movement for 2 or more days,  take 2 tablets of bisacodyl (10 mg), Disp: 30 tablet, Rfl: 2    buprenorphine HCl-naloxone HCl (SUBOXONE) 2-0.5 MG per film, Place 0.5 Film under the tongue daily , Disp: , Rfl:     cetirizine (ZYRTEC) 10 MG tablet, TAKE ONE TABLET BY MOUTH EVERY MORNING, Disp: 90 tablet, Rfl: 3    CONSTULOSE 10 GM/15ML solution, TAKE 15 MLS (10 G) BY MOUTH 3 TIMES DAILY AS NEEDED FOR CONSTIPATION (AS NEEDED FOR SEVERE CONSTIPAITON, NO BM FOR MORE THAN 3 DAYS AND FEELING CONSTIPATED), Disp: 300 mL, Rfl: 3    cyclobenzaprine (FLEXERIL) 5 MG tablet, TAKE 1-2 TABLETS (5-10 MG) BY MOUTH 3 TIMES DAILY AS NEEDED FOR MUSCLE SPASMS, Disp: 90 tablet, Rfl: 5    DULoxetine (CYMBALTA) 30 MG capsule, Take 30 mg in evening, along with 60 mg in morning., Disp: 90 capsule, Rfl: 1    DULoxetine (CYMBALTA) 60 MG capsule, Take 1 capsule (60 mg) by mouth every morning, Disp: 90 capsule, Rfl: 1    famotidine (PEPCID) 40 MG tablet, TAKE 1 TABLET (40 MG) BY MOUTH 2 TIMES DAILY, Disp: 60 tablet, Rfl: 1    gabapentin (NEURONTIN) 300 MG capsule, Take 1 capsule (300 mg) by mouth daily, Disp: , Rfl:     ondansetron (ZOFRAN ODT) 4 MG ODT tab, DISSOLVE ONE TABLET ON TONGUE EVERY SIX HOURS AS NEEDED FOR NAUSEA, Disp: 20 tablet, Rfl: 1    pantoprazole (PROTONIX) 40 MG EC tablet, TAKE ONE TABLET BY MOUTH TWICE A DAY WITH MEALS, Disp: 60 tablet, Rfl: 5    polyethylene glycol (MIRALAX) 17 GM/Dose powder, Take 17 g by mouth daily, Disp: 578 g, Rfl: 3    rOPINIRole (REQUIP) 1 MG tablet, TAKE ONE TABLET BY MOUTH EVERY NIGHT AT BEDTIME, Disp: 90 tablet, Rfl: 0    simvastatin (ZOCOR) 10 MG tablet, TAKE ONE TABLET BY MOUTH EVERY NIGHT AT BEDTIME, Disp: 90 tablet, Rfl: 0    zolpidem (AMBIEN) 5 MG tablet, TAKE ONE TABLET (5 MG) BY MOUTH NIGHTLY AS NEEDED FOR SLEEP, Disp: 30 tablet, Rfl: 1      ROS: 10pt ROS performed and otherwise negative.    PHYSICAL EXAMINATION:  Wt:   Wt Readings from Last 2 Encounters:   04/19/24 79.8 kg (176 lb)   04/11/24 81.2 kg (179 lb)       Physical Exam  Vitals reviewed: LMP  (LMP Unknown)    Constitutional: aaox3, cooperative, pleasant, not dyspneic/diaphoretic, no acute distress  Eyes: Sclera anicteric/injected  Respiratory: Unlabored breathing, speaking in full sentences.   Psych: Normal affect, speech is clear and appropriate.Neatly groomed    RECENT LABS:   Recent Labs   Lab Test 05/13/24  1538 09/13/23  0012   WBC 8.0 8.0   HGB 13.5 12.8   HCT 41.3 38.4    231     Recent Labs   Lab Test 09/13/23  0012 09/06/23  2310   ALT 25 22   AST 24 25     Recent Labs   Lab Test 05/13/24  1538 01/05/24  0800   CR 0.74 0.77     TSH   Date Value Ref Range Status   01/05/2024 3.75 0.30 - 4.20 uIU/mL Final   06/29/2022 3.51 0.40 - 4.00 mU/L Final   03/02/2021 2.42 0.40 - 4.00 mU/L Final         ASSESSMENT/PLAN:    ICD-10-CM    1. Constipation due to opioid therapy  K59.03 Adult GI Clinic Follow-Up Order (Blank)    T40.2X5A linaclotide (LINZESS) 72 MCG capsule      2. Gastroesophageal reflux disease with esophagitis without hemorrhage  K21.00 Adult GI  Referral - Procedure Only     famotidine (PEPCID) 40 MG tablet      3. Atypical chest pain  R07.89 Adult GI  Referral - Procedure Only      4. Abdominal bloating  R14.0            Radha Beckwith is a 48 year old female who presents to GI clinic for a follow up on drug induced constipation and GERD symptoms. Patient reported still poor control of acid reflux despite taking maximal dose of pantoprazole. She stopped famotidine as it was not effective. Not interested in another trial of sucralfate as she believes it was making her abdominal pain worse.  Last EGD in Jan.2023, was suggestive for diffuse mild erythema of the stomach.  Pathology report confirmed mild inactive chronic gastritis.  H. pylori was negative. Due to persistent symptoms, will order EGD with BRAVO.  Suggested to continue omeprazole at the current dose. Resume famotidine.  Patient is also on baclofen twice a day (that  patient takes for back pain).  Explained to the patient that we reserve fundoplication for patients with symptoms refractory to medical therapy although predication of successful resolution of symptoms after the surgery is still challenging.     I suggested to optimize constipation management. She tried bisacodyl and Miralax before with very minimal improvement in constipation. Has to use enema and suppository at times. Takes lactulose as needed. Had inconclusive colonoscopy report due to poor bowel prep.  Recommended to repeat the study in 3 to 5 years given the family history of colonic polyps in mother and maternal grandmother. Suggested to resume Miralax and take it every day. Will start on a low dose of linaclotide and taper it up if needed.     Patient verbalized understanding and appreciation of care provided. Stated that all of the questions were answered to her/his satisfaction.  Follow up in 3 months  This note was created with Dragon voice recognition software. Inadvertent minor typographic errors may occur in transcription. Feel free to contact the provider if you have any questions.  I sincerely appreciate an opportunity to provide consultation for this pleasant patient.    CHAPIN Ponce  [facility],  Gastroenterology,  [place], [place]

## 2024-05-09 ENCOUNTER — VIRTUAL VISIT (OUTPATIENT)
Dept: PSYCHOLOGY | Facility: CLINIC | Age: 49
End: 2024-05-09
Payer: COMMERCIAL

## 2024-05-09 DIAGNOSIS — F33.1 MODERATE EPISODE OF RECURRENT MAJOR DEPRESSIVE DISORDER (H): Primary | ICD-10-CM

## 2024-05-09 DIAGNOSIS — F41.1 GENERALIZED ANXIETY DISORDER: ICD-10-CM

## 2024-05-09 PROCEDURE — 90834 PSYTX W PT 45 MINUTES: CPT | Mod: 95 | Performed by: COUNSELOR

## 2024-05-09 NOTE — PROGRESS NOTES
Answers submitted by the patient for this visit:  Patient Health Questionnaire (Submitted on 5/9/2024)  If you checked off any problems, how difficult have these problems made it for you to do your work, take care of things at home, or get along with other people?: Extremely difficult  PHQ9 TOTAL SCORE: 24  BO-7 (Submitted on 5/2/2024)  BO 7 TOTAL SCORE: 21        [facility]                                     Progress Note    Patient Name: Radha Beckwith  Date: 5/9/24         Service Type: Individual      Session Start Time: 3:00pm Session End Time: 3:50pm     Session Length: 50     Session #: 35    Attendees: Client    Service Modality:  Video Visit:      Provider verified identity through the following two step process.  Patient provided:  Patient is known previously to provider    Telemedicine Visit: The patient's condition can be safely assessed and treated via synchronous audio and visual telemedicine encounter.      Reason for Telemedicine Visit: Patient convenience (e.g. access to timely appointments / distance to available provider)    Originating Site (Patient Location): Patient's home    Distant Site (Provider Location): Provider Remote Setting- Home Office    Consent:  The patient/guardian has verbally consented to: the potential risks and benefits of telemedicine (video visit) versus in person care; bill my insurance or make self-payment for services provided; and responsibility for payment of non-covered services.     Patient would like the video invitation sent by:  My Chart    Mode of Communication:  Video Conference via [facility]    Distant Location (Provider):  On-site    As the provider I attest to compliance with applicable laws and regulations related to telemedicine.    DATA  Interactive Complexity: No  Crisis: No        Progress Since Last Session (Related to Symptoms / Goals / Homework):   Symptoms: No change .    Homework: Completed in session      Episode of Care  "Goals: Satisfactory progress - MAINTENANCE (Working to maintain change, with risk of relapse); Intervened by continuing to positively reinforce healthy behavior choice      Current / Ongoing Stressors and Concerns:   The client stated she's got a headache today because of the weather.   Stated she made soup this week but it took her twice as long to made because of the pain it caused from cutting vegetables and making it all.   Stated she's losing hair right now too which is concerning.         Treatment Objective(s) Addressed in This Session:   Increase interest, engagement, and pleasure in doing things  Feel less tired and more energy during the day        Intervention:   Actively listened and reflected back while the client was discussing her worries. Talked about the stressors she's experiencing that might be contributing to some of the physical symptoms she's experiencing. The client stated she's also been having some \"memories\"/ flashbacks to certain traumas from her past that she hasn't thought about in a long time. She wonders if this is being caused by a medication. Encouraged the client to talk to her med provider about this. Continued to reinforce the use of relaxation skills for the client daily.         Assessments completed prior to visit:  The following assessments were completed by patient for this visit:  PHQ9:       3/14/2024     2:53 PM 3/27/2024     8:50 PM 4/10/2024     1:59 PM 4/17/2024     1:20 PM 4/22/2024     3:31 PM 5/2/2024     1:10 PM 5/9/2024    12:29 PM   PHQ-9 SCORE   PHQ-9 Total Score MyChart 24 (Severe depression) 22 (Severe depression) 22 (Severe depression) 24 (Severe depression) 24 (Severe depression) 24 (Severe depression) 24 (Severe depression)   PHQ-9 Total Score 24    24 22 22 24 24 24 24     GAD7:       1/18/2024     2:56 PM 2/5/2024     6:27 PM 2/19/2024     3:39 PM 3/12/2024     8:32 PM 3/27/2024     8:51 PM 4/17/2024     1:23 PM 5/2/2024     1:11 PM   BO-7 SCORE   Total " Score 20 (severe anxiety) 21 (severe anxiety) 19 (severe anxiety) 20 (severe anxiety) 19 (severe anxiety) 21 (severe anxiety) 21 (severe anxiety)   Total Score 20    20 21 19 20 19 21    21 21    21     PROMIS 10-Global Health (all questions and answers displayed):       11/26/2023    10:43 PM 2/27/2024     6:40 PM 3/12/2024     8:35 PM 3/27/2024     8:53 PM 4/2/2024     4:38 PM 4/10/2024     1:58 PM 4/17/2024     1:28 PM   PROMIS 10   In general, would you say your health is: Fair Fair Poor Poor Poor Poor Poor   In general, would you say your quality of life is: Poor Fair Poor Poor Poor Poor Poor   In general, how would you rate your physical health? Poor Poor Poor Poor Poor Poor Poor   In general, how would you rate your mental health, including your mood and your ability to think? Poor Fair Poor Poor Poor Poor Poor   In general, how would you rate your satisfaction with your social activities and relationships? Poor Poor Poor Poor Poor Poor Poor   In general, please rate how well you carry out your usual social activities and roles Fair Poor Poor Poor Poor Fair Poor   To what extent are you able to carry out your everyday physical activities such as walking, climbing stairs, carrying groceries, or moving a chair? A little A little A little A little A little A little A little   In the past 7 days, how often have you been bothered by emotional problems such as feeling anxious, depressed, or irritable? Always Always Always Always Always Always Always   In the past 7 days, how would you rate your fatigue on average? Severe Severe Severe Severe Severe Severe Very severe   In the past 7 days, how would you rate your pain on average, where 0 means no pain, and 10 means worst imaginable pain? 7 7 8 7 7 7 7   In general, would you say your health is: 2 2 1 1 1 1 1   In general, would you say your quality of life is: 1 2 1 1 1 1 1   In general, how would you rate your physical health? 1 1 1 1 1 1 1   In general, how would  you rate your mental health, including your mood and your ability to think? 1 2 1 1 1 1 1   In general, how would you rate your satisfaction with your social activities and relationships? 1 1 1 1 1 1 1   In general, please rate how well you carry out your usual social activities and roles. (This includes activities at home, at work and in your community, and responsibilities as a parent, child, spouse, employee, friend, etc.) 2 1 1 1 1 2 1   To what extent are you able to carry out your everyday physical activities such as walking, climbing stairs, carrying groceries, or moving a chair? 2 2 2 2 2 2 2   In the past 7 days, how often have you been bothered by emotional problems such as feeling anxious, depressed, or irritable? 5 5 5 5 5 5 5   In the past 7 days, how would you rate your fatigue on average? 4 4 4 4 4 4 5   In the past 7 days, how would you rate your pain on average, where 0 means no pain, and 10 means worst imaginable pain? 7 7 8 7 7 7 7   Global Mental Health Score 4 6 4 4 4 4    4 4    4   Global Physical Health Score 7 7 7 7 7 7    7 6    6   PROMIS TOTAL - SUBSCORES 11 13 11 11 11 11    11 10    10     [place] Suicide Severity Rating Scale (Lifetime/Recent)      10/4/2022    11:00 AM   [place] Suicide Severity Rating (Lifetime/Recent)   Q1 Wished to be Dead (Past Month) no   Q2 Suicidal Thoughts (Past Month) no   Q3 Suicidal Thought Method no   Q4 Suicidal Intent without Specific Plan no   Q5 Suicide Intent with Specific Plan yes   Q6 Suicide Behavior (Lifetime) yes   Within the Past 3 Months? no   Level of Risk per Screen high risk         ASSESSMENT: Current Emotional / Mental Status (status of significant symptoms):   Risk status (Self / Other harm or suicidal ideation)   Patient denies current fears or concerns for personal safety.   Patient denies current or recent suicidal ideation or behaviors.   Patient denies current or recent homicidal ideation or behaviors.   Patient denies current or  recent self injurious behavior or ideation.   Patient denies other safety concerns.   Patient reports there has been no change in risk factors since their last session.     Patient reports there has been no change in protective factors since their last session.     Recommended that patient call 911 or go to the local ED should there be a change in any of these risk factors.     Appearance:   Appropriate    Eye Contact:   Good    Psychomotor Behavior: Normal    Attitude:   Cooperative    Orientation:   All   Speech    Rate / Production: Normal/ Responsive Normal     Volume:  Normal    Mood:    Normal   Affect:    Appropriate    Thought Content:  Clear  Rumination    Thought Form:  Coherent    Insight:    Good      Medication Review:   No changes to current psychiatric medication(s)     Medication Compliance:   Yes     Changes in Health Issues:   None reported     Chemical Use Review:   Substance Use: Chemical use reviewed, no active concerns identified      Tobacco Use: No change in amount of tobacco use since last session.  Patient declined discussion at this time    Diagnosis:  1. Moderate episode of recurrent major depressive disorder (H)    2. Generalized anxiety disorder          Collateral Reports Completed:   Not Applicable    PLAN: (Patient Tasks / Therapist Tasks / Other)  Continue to continue to work on relaxation skills and discuss her worries with her doctor.          Ricarda Bhatia, [facility]                                                         ______________________________________________________________________    Individual Treatment Plan    Patient's Name: Radha Beckwith  YOB: 1975    Date of Creation: 6/28/23  Date Treatment Plan Last Reviewed/Revised: 5/9/24    DSM5 Diagnoses: 296.32 (F33.1) Major Depressive Disorder, Recurrent Episode, Moderate _  Psychosocial / Contextual Factors: living with boyfriend, memory issues  PROMIS (reviewed every 90 days):     Referral /  Collaboration:  Referral to another professional/service is not indicated at this time..    Anticipated number of session for this episode of care: 9-12 sessions  Anticipation frequency of session:  as needed  Anticipated Duration of each session: 38-52 minutes  Treatment plan will be reviewed in 90 days or when goals have been changed.       MeasurableTreatment Goal(s) related to diagnosis / functional impairment(s)  Goal 1: Patient will increase communication skills with family members.    Objective #A (Patient Action)    Patient will learn & utilize at least 2 assertive communication skills weekly.  Status: Continued - Date(s): 5/9/24    Intervention(s)  Therapist will teach emotional regulation skills. distress tolerance skills, interpersonal effectiveness skills, emotion regluation skills, mindfulness skills, radical acceptance. Therapist will teach client how to ID body cues for anxiety, anxiety reduction techniques, how to ID triggers for depression and anxiety- decrease reactivity/eliminate, lifestyle changes to reduce depression and anxiety, communication skills, explore cognitive beliefs and help client to develop healthy cognitive patterns and beliefs.    Objective #B  Patient will use thought-stopping strategy daily to reduce intrusive thoughts.  Status: Continued - Date(s): 5/9/24    Intervention(s)  Therapist will teach emotional regulation skills. distress tolerance skills, interpersonal effectiveness skills, emotion regluation skills, mindfulness skills, radical acceptance. Therapist will teach client how to ID body cues for anxiety, anxiety reduction techniques, how to ID triggers for depression and anxiety- decrease reactivity/eliminate, lifestyle changes to reduce depression and anxiety, communication skills, explore cognitive beliefs and help client to develop healthy cognitive patterns and beliefs.      Goal 2: Patient will decrease depression symptoms.      Objective #A (Patient  Action)    Status: Continued - Date(s): 5/9/24    Patient will Increase interest, engagement, and pleasure in doing things  Decrease frequency and intensity of feeling down, depressed, hopeless  Improve quantity and quality of night time sleep / decrease daytime naps  Feel less tired and more energy during the day   Improve diet, appetite, mindful eating, and / or meal planning  Identify negative self-talk and behaviors: challenge core beliefs, myths, and actions  Improve concentration, focus, and mindfulness in daily activities   Feel less fidgety, restless or slow in daily activities / interpersonal interactions.    Intervention(s)  Therapist will teach emotional regulation skills. distress tolerance skills, interpersonal effectiveness skills, emotion regluation skills, mindfulness skills, radical acceptance. Therapist will teach client how to ID body cues for anxiety, anxiety reduction techniques, how to ID triggers for depression and anxiety- decrease reactivity/eliminate, lifestyle changes to reduce depression and anxiety, communication skills, explore cognitive beliefs and help client to develop healthy cognitive patterns and beliefs.    Objective #B  Patient will identify three distraction and diversion activities and use those activities to decrease level of anxiety  .    Status: Continued - Date(s):  5/9/24    Intervention(s)  Therapist will teach emotional regulation skills. distress tolerance skills, interpersonal effectiveness skills, emotion regluation skills, mindfulness skills, radical acceptance. Therapist will teach client how to ID body cues for anxiety, anxiety reduction techniques, how to ID triggers for depression and anxiety- decrease reactivity/eliminate, lifestyle changes to reduce depression and anxiety, communication skills, explore cognitive beliefs and help client to develop healthy cognitive patterns and beliefs.      Patient has reviewed and agreed to the above plan.      Ricarda  English, [facility]

## 2024-05-10 ENCOUNTER — THERAPY VISIT (OUTPATIENT)
Dept: PHYSICAL THERAPY | Facility: CLINIC | Age: 49
End: 2024-05-10
Attending: PREVENTIVE MEDICINE
Payer: COMMERCIAL

## 2024-05-10 DIAGNOSIS — M54.2 CERVICALGIA: Primary | ICD-10-CM

## 2024-05-10 PROCEDURE — 97140 MANUAL THERAPY 1/> REGIONS: CPT | Mod: GP | Performed by: PHYSICAL THERAPIST

## 2024-05-13 ENCOUNTER — HOSPITAL ENCOUNTER (EMERGENCY)
Facility: CLINIC | Age: 49
Discharge: HOME | End: 2024-05-13
Attending: EMERGENCY MEDICINE | Admitting: EMERGENCY MEDICINE
Payer: COMMERCIAL

## 2024-05-13 ENCOUNTER — APPOINTMENT (OUTPATIENT)
Dept: GENERAL RADIOLOGY | Facility: CLINIC | Age: 49
End: 2024-05-13
Attending: EMERGENCY MEDICINE
Payer: COMMERCIAL

## 2024-05-13 VITALS
SYSTOLIC BLOOD PRESSURE: 116 MMHG | HEART RATE: 75 BPM | RESPIRATION RATE: 18 BRPM | TEMPERATURE: 98.2 F | DIASTOLIC BLOOD PRESSURE: 81 MMHG | OXYGEN SATURATION: 95 %

## 2024-05-13 DIAGNOSIS — R91.1 PULMONARY NODULE: ICD-10-CM

## 2024-05-13 DIAGNOSIS — I10 ACCELERATED HYPERTENSION: ICD-10-CM

## 2024-05-13 DIAGNOSIS — R07.89 ATYPICAL CHEST PAIN: ICD-10-CM

## 2024-05-13 LAB
ANION GAP SERPL CALCULATED.3IONS-SCNC: 11 MMOL/L (ref 7–15)
BASOPHILS # BLD AUTO: 0 10E3/UL (ref 0–0.2)
BASOPHILS NFR BLD AUTO: 1 %
BUN SERPL-MCNC: 13.1 MG/DL (ref 6–20)
CALCIUM SERPL-MCNC: 9.2 MG/DL (ref 8.6–10)
CHLORIDE SERPL-SCNC: 102 MMOL/L (ref 98–107)
CREAT SERPL-MCNC: 0.74 MG/DL (ref 0.51–0.95)
DEPRECATED HCO3 PLAS-SCNC: 27 MMOL/L (ref 22–29)
EGFRCR SERPLBLD CKD-EPI 2021: >90 ML/MIN/1.73M2
EOSINOPHIL # BLD AUTO: 0.1 10E3/UL (ref 0–0.7)
EOSINOPHIL NFR BLD AUTO: 1 %
ERYTHROCYTE [DISTWIDTH] IN BLOOD BY AUTOMATED COUNT: 14.4 % (ref 10–15)
GLUCOSE SERPL-MCNC: 112 MG/DL (ref 70–99)
HCT VFR BLD AUTO: 41.3 % (ref 35–47)
HGB BLD-MCNC: 13.5 G/DL (ref 11.7–15.7)
IMM GRANULOCYTES # BLD: 0 10E3/UL
IMM GRANULOCYTES NFR BLD: 0 %
LYMPHOCYTES # BLD AUTO: 2.3 10E3/UL (ref 0.8–5.3)
LYMPHOCYTES NFR BLD AUTO: 29 %
MCH RBC QN AUTO: 28.4 PG (ref 26.5–33)
MCHC RBC AUTO-ENTMCNC: 32.7 G/DL (ref 31.5–36.5)
MCV RBC AUTO: 87 FL (ref 78–100)
MONOCYTES # BLD AUTO: 0.4 10E3/UL (ref 0–1.3)
MONOCYTES NFR BLD AUTO: 5 %
NEUTROPHILS # BLD AUTO: 5.2 10E3/UL (ref 1.6–8.3)
NEUTROPHILS NFR BLD AUTO: 65 %
NRBC # BLD AUTO: 0 10E3/UL
NRBC BLD AUTO-RTO: 0 /100
PLATELET # BLD AUTO: 221 10E3/UL (ref 150–450)
POTASSIUM SERPL-SCNC: 4 MMOL/L (ref 3.4–5.3)
RBC # BLD AUTO: 4.76 10E6/UL (ref 3.8–5.2)
SODIUM SERPL-SCNC: 140 MMOL/L (ref 135–145)
TROPONIN T SERPL HS-MCNC: <6 NG/L
TROPONIN T SERPL HS-MCNC: <6 NG/L
WBC # BLD AUTO: 8 10E3/UL (ref 4–11)

## 2024-05-13 PROCEDURE — 99285 EMERGENCY DEPT VISIT HI MDM: CPT | Mod: 25 | Performed by: EMERGENCY MEDICINE

## 2024-05-13 PROCEDURE — 85041 AUTOMATED RBC COUNT: CPT | Performed by: EMERGENCY MEDICINE

## 2024-05-13 PROCEDURE — 93010 ELECTROCARDIOGRAM REPORT: CPT | Performed by: EMERGENCY MEDICINE

## 2024-05-13 PROCEDURE — 93005 ELECTROCARDIOGRAM TRACING: CPT | Performed by: EMERGENCY MEDICINE

## 2024-05-13 PROCEDURE — 85025 COMPLETE CBC W/AUTO DIFF WBC: CPT | Performed by: EMERGENCY MEDICINE

## 2024-05-13 PROCEDURE — 84484 ASSAY OF TROPONIN QUANT: CPT | Mod: 91 | Performed by: EMERGENCY MEDICINE

## 2024-05-13 PROCEDURE — 36415 COLL VENOUS BLD VENIPUNCTURE: CPT | Performed by: EMERGENCY MEDICINE

## 2024-05-13 PROCEDURE — 250N000013 HC RX MED GY IP 250 OP 250 PS 637: Performed by: EMERGENCY MEDICINE

## 2024-05-13 PROCEDURE — 82565 ASSAY OF CREATININE: CPT | Performed by: EMERGENCY MEDICINE

## 2024-05-13 PROCEDURE — 99284 EMERGENCY DEPT VISIT MOD MDM: CPT | Performed by: EMERGENCY MEDICINE

## 2024-05-13 PROCEDURE — 82374 ASSAY BLOOD CARBON DIOXIDE: CPT | Performed by: EMERGENCY MEDICINE

## 2024-05-13 PROCEDURE — 71046 X-RAY EXAM CHEST 2 VIEWS: CPT

## 2024-05-13 RX ORDER — LIDOCAINE 4 G/G
1 PATCH TOPICAL ONCE
Status: DISCONTINUED | OUTPATIENT
Start: 2024-05-13 | End: 2024-05-13 | Stop reason: HOSPADM

## 2024-05-13 RX ADMIN — LIDOCAINE 1 PATCH: 560 PATCH PERCUTANEOUS; TOPICAL; TRANSDERMAL at 16:21

## 2024-05-13 ASSESSMENT — ACTIVITIES OF DAILY LIVING (ADL)
ADLS_ACUITY_SCORE: 35

## 2024-05-13 ASSESSMENT — COLUMBIA-SUICIDE SEVERITY RATING SCALE - C-SSRS
2. HAVE YOU ACTUALLY HAD ANY THOUGHTS OF KILLING YOURSELF IN THE PAST MONTH?: NO
1. IN THE PAST MONTH, HAVE YOU WISHED YOU WERE DEAD OR WISHED YOU COULD GO TO SLEEP AND NOT WAKE UP?: NO
6. HAVE YOU EVER DONE ANYTHING, STARTED TO DO ANYTHING, OR PREPARED TO DO ANYTHING TO END YOUR LIFE?: NO

## 2024-05-13 NOTE — ED PROVIDER NOTES
History     Chief Complaint   Patient presents with    Shortness of Breath    Chest Pain     HPI  Radha Beckwith is a 48 year old female who presents with shortness of breath and chest pain.  Symptoms came on suddenly this afternoon.  Says that she was doing some vacuuming, and did not have symptoms during this activity.  She sat down afterwards and was eating a snack when she all of a sudden felt pain in the middle of her chest, more towards the right side, that radiated up into her neck and jaw.  Said that she felt some acid reflux.  Went for a walk to the mailbox, she thought that this might be due to a panic attack, but her symptoms did not improve.  She came to the hospital for further evaluation.  At time of examination, says that her symptoms have almost completely resolved.  She has had 1 episode like this in the past, but did not get checked out.  Possible family history on her father side of heart disease, but she is unclear on which family members may have the specific issues.  Her father  at age 55 from a brain aneurysm.  She does have a history of migraines, and has been evaluated for brain aneurysm, and says that this has always been fine.  Does have a slight headache now but otherwise remains asymptomatic.  She does not take any aspirin.  Does not take any medication for high blood pressure.  She is taking medication for high cholesterol.  Is not diabetic.  Does smoke, typically will smoke about 5 cigarettes/day but recently due to increased stress has been smoking about 10 cigarettes/day.    Allergies:  Allergies   Allergen Reactions    Ergotamine-Caffeine      2002            GI problems-    Seasonal Allergies     Sumatriptan      vomits after giving herself a shot    Compazine Anxiety and Palpitations     Severe anxiety attack    Droperidol Anxiety and Palpitations     Severe anxiety attack    Nubain [Nalbuphine Hcl] Anxiety and Palpitations     Severe anxiety attack     Prochlorperazine Anxiety and Palpitations     Uncontrolled movement, severe anxiety attack       Problem List:    Patient Active Problem List    Diagnosis Date Noted    Cervicalgia 04/02/2024     Priority: Medium    Cervical radiculopathy 02/14/2024     Priority: Medium    Chronic bilateral thoracic back pain 02/14/2024     Priority: Medium    Lumbar radiculopathy 02/14/2024     Priority: Medium    Pain of both sacroiliac joints 02/14/2024     Priority: Medium    Radicular pain of upper extremity 02/14/2024     Priority: Medium    Somatic dysfunction of pelvic region 01/10/2024     Priority: Medium    Myofascial pain 01/10/2024     Priority: Medium    Generalized anxiety disorder 11/27/2023     Priority: Medium    Chronic low back pain with bilateral sciatica 06/15/2023     Priority: Medium    Chronic neck pain 06/15/2023     Priority: Medium    Moderate episode of recurrent major depressive disorder (H) 06/29/2022     Priority: Medium    Supraventricular tachycardia (H24) 06/03/2019     Priority: Medium    Psychophysiological insomnia 12/30/2017     Priority: Medium    Chronic bilateral low back pain without sciatica 12/30/2017     Priority: Medium    Opioid dependence in remission (H) 08/02/2015     Priority: Medium     On suboxone treatement with Bryant Harkins.  (Noted in 2015).        Anxiety attack 07/31/2015     Priority: Medium    Hypokalemia 06/23/2015     Priority: Medium    Tobacco abuse 06/23/2015     Priority: Medium    Hyperlipidemia LDL goal <100 01/29/2014     Priority: Medium    GERD (gastroesophageal reflux disease) 07/28/2010     Priority: Medium     Takes omeprazole and metoclopramide      Restless legs 07/28/2010     Priority: Medium        Past Medical History:    Past Medical History:   Diagnosis Date    Abdominal pain, right lower quadrant 03/09/2008    Anxiety attack 07/31/2015    Atypical chest pain 06/23/2015    De Quervain's disease (tenosynovitis)     Dehydration      Depressive disorder 1996    Gastric ulcer 07/31/2015    GERD (gastroesophageal reflux disease) 07/28/2010    Hypertension 2017    Ingrowing nail 01/09/2014    Migraines     Opioid dependence in remission (H) 08/02/2015    Other and unspecified ovarian cyst     Papanicolaou smear of cervix with low grade squamous intraepithelial lesion (LGSIL) 07/07/2017       Past Surgical History:    Past Surgical History:   Procedure Laterality Date    BIOPSY CERVICAL, LOCAL EXCISION, SINGLE/MULTIPLE N/A 8/10/2017    Procedure: BIOPSY CERVICAL, LOCAL EXCISION, SINGLE/MULTIPLE;;  Surgeon: Michael Chandler MD;  [facility]    COLONOSCOPY N/A 1/6/2023    Procedure: COLONOSCOPY;  Surgeon: Michael Dozier MD;  [facility]    COLPOSCOPY, BIOPSY, COMBINED N/A 8/10/2017    Procedure: COMBINED COLPOSCOPY, BIOPSY;  Colposcopy with Cervical Biopsies and Endometrial Biopsy, Exam with Ultrasound;  Surgeon: Michael Chandler MD;  [facility]    ESOPHAGOSCOPY, GASTROSCOPY, DUODENOSCOPY (EGD), COMBINED N/A 4/17/2017    Procedure: COMBINED ESOPHAGOSCOPY, GASTROSCOPY, DUODENOSCOPY (EGD);  Surgeon: Ibrahima Esposito MD;  [facility]    ESOPHAGOSCOPY, GASTROSCOPY, DUODENOSCOPY (EGD), COMBINED N/A 1/6/2023    Procedure: ESOPHAGOGASTRODUODENOSCOPY, WITH BIOPSY;  Surgeon: Michael Dozier MD;  [facility]    ESOPHAGOSCOPY, GASTROSCOPY, DUODENOSCOPY (EGD), COMBINED N/A 10/3/2023    Procedure: ESOPHAGOGASTRODUODENOSCOPY, WITH BIOPSY;  Surgeon: John Paul Varela MD;  [facility]    EXAM UNDER ANESTHESIA PELVIC N/A 8/10/2017    Procedure: EXAM UNDER ANESTHESIA PELVIC;;  Surgeon: Michael Chandler MD;  [facility]    HYSTERECTOMY      HYSTERECTOMY, PAP NO LONGER INDICATED      INJECT EPIDURAL CERVICAL N/A 10/20/2023    Procedure: Cervical 6-7 Interlaminar epidural steroid injection using fluoroscopic guidance with contrast dye.;  Surgeon: Trevor Ivory MD;  [facility]    INJECT EPIDURAL  CERVICAL N/A 2024    Procedure: Cervical 6 - Cervical 7 Interlaminar epidural steroid injection using fluoroscopic guidance with contrast dye.;  Surgeon: Trevor Ivory MD;  [facility]    INJECT EPIDURAL LUMBAR Bilateral 2022    Procedure: Lumbar 5-Sacral 1 Transforaminal Epidural Steroid Injection with fluoroscopic guidance and contrast, bilateral;  Surgeon: Trevor Ivory MD;  [facility]    LAPAROSCOPIC CHOLECYSTECTOMY N/A 2018    Procedure: LAPAROSCOPIC CHOLECYSTECTOMY;  Laparoscopic Cholecystectomy;  Surgeon: Tigre Lowry DO;  [facility]    LAPAROSCOPIC HYSTERECTOMY TOTAL N/A 10/30/2017    Procedure: LAPAROSCOPIC HYSTERECTOMY TOTAL;  LAPAROSCOPIC HYSTERECTOMY TOTAL POSSIBLE SALPINGO-OOPHERECTOMY (BILATERAL);  Surgeon: Michael Chandler MD;  [facility]    ZZHC UGI ENDOSCOPY, SIMPLE EXAM  08       Family History:    Family History   Problem Relation Age of Onset    Depression Mother     Respiratory Mother     Chronic Obstructive Pulmonary Disease Mother     Hypertension Mother     Anxiety Disorder Mother     Cerebrovascular Disease Father         First stroke at age 28,  from 2nd when he was 55. Brain aneurysms.    Breast Cancer Cousin     Adrenal Disorder Other     Chronic Obstructive Pulmonary Disease Other     Asthma Brother        Social History:  Marital Status:  Single [1]  Social History     Tobacco Use    Smoking status: Every Day     Current packs/day: 0.25     Average packs/day: 0.3 packs/day for 20.0 years (5.0 ttl pk-yrs)     Types: Cigarettes     Passive exposure: Never    Smokeless tobacco: Never   Vaping Use    Vaping status: Former    Substances: Nicotine, CBD    Devices: Loomio tank   Substance Use Topics    Alcohol use: Yes     Comment: Occasionaly    Drug use: No        Medications:    acetaminophen (TYLENOL) 500 MG tablet  ALPRAZolam (XANAX) 0.5 MG tablet  baclofen (LIORESAL) 20 MG tablet  bisacodyl (DULCOLAX) 5 MG EC  tablet  buprenorphine HCl-naloxone HCl (SUBOXONE) 2-0.5 MG per film  cetirizine (ZYRTEC) 10 MG tablet  CONSTULOSE 10 GM/15ML solution  cyclobenzaprine (FLEXERIL) 5 MG tablet  DULoxetine (CYMBALTA) 30 MG capsule  DULoxetine (CYMBALTA) 60 MG capsule  famotidine (PEPCID) 40 MG tablet  gabapentin (NEURONTIN) 300 MG capsule  ondansetron (ZOFRAN ODT) 4 MG ODT tab  pantoprazole (PROTONIX) 40 MG EC tablet  polyethylene glycol (MIRALAX) 17 GM/Dose powder  rOPINIRole (REQUIP) 1 MG tablet  simvastatin (ZOCOR) 10 MG tablet  zolpidem (AMBIEN) 5 MG tablet          Review of Systems   All other systems reviewed and are negative.      Physical Exam   BP: (!) 166/106  Pulse: 114  Temp: 98.2  F (36.8  C)  Resp: 16  SpO2: 99 %      Physical Exam  Vitals and nursing note reviewed.   Constitutional:       Appearance: She is not toxic-appearing or diaphoretic.   HENT:      Head:      Jaw: There is normal jaw occlusion. No trismus.      Mouth/Throat:      Mouth: Mucous membranes are moist. No oral lesions.   Cardiovascular:      Rate and Rhythm: Normal rate and regular rhythm.   Pulmonary:      Effort: Pulmonary effort is normal.      Breath sounds: Normal breath sounds. No stridor.   Abdominal:      General: Bowel sounds are normal.   Skin:     General: Skin is warm and dry.   Neurological:      General: No focal deficit present.      Mental Status: She is alert.   Psychiatric:         Mood and Affect: Mood normal.         Behavior: Behavior normal.         ED Course        Procedures              EKG Interpretation:      Interpreted by Jessica Don DO  Time reviewed: 1600  Symptoms at time of EKG: Chest pain, shortness of breath  Rhythm: normal sinus   Rate: 106 bpm  Axis: normal  Ectopy: none  Conduction: normal  ST Segments/ T Waves: No ST-T wave changes  Q Waves: none  Comparison to prior: Unchanged    Clinical Impression: normal EKG      Critical Care time:  none               Results for orders placed or performed during  the hospital encounter of 05/13/24 (from the past 24 hour(s))   CBC with Platelets & Differential    Narrative    The following orders were created for panel order CBC with Platelets & Differential.  Procedure                               Abnormality         Status                     ---------                               -----------         ------                     CBC with platelets and d...[[id number]]                      Final result                 Please view results for these tests on the individual orders.   Basic metabolic panel   Result Value Ref Range    Sodium 140 135 - 145 mmol/L    Potassium 4.0 3.4 - 5.3 mmol/L    Chloride 102 98 - 107 mmol/L    Carbon Dioxide (CO2) 27 22 - 29 mmol/L    Anion Gap 11 7 - 15 mmol/L    Urea Nitrogen 13.1 6.0 - 20.0 mg/dL    Creatinine 0.74 0.51 - 0.95 mg/dL    GFR Estimate >90 >60 mL/min/1.73m2    Calcium 9.2 8.6 - 10.0 mg/dL    Glucose 112 (H) 70 - 99 mg/dL   Troponin T, High Sensitivity   Result Value Ref Range    Troponin T, High Sensitivity <6 <=14 ng/L   CBC with platelets and differential   Result Value Ref Range    WBC Count 8.0 4.0 - 11.0 10e3/uL    RBC Count 4.76 3.80 - 5.20 10e6/uL    Hemoglobin 13.5 11.7 - 15.7 g/dL    Hematocrit 41.3 35.0 - 47.0 %    MCV 87 78 - 100 fL    MCH 28.4 26.5 - 33.0 pg    MCHC 32.7 31.5 - 36.5 g/dL    RDW 14.4 10.0 - 15.0 %    Platelet Count 221 150 - 450 10e3/uL    % Neutrophils 65 %    % Lymphocytes 29 %    % Monocytes 5 %    % Eosinophils 1 %    % Basophils 1 %    % Immature Granulocytes 0 %    NRBCs per 100 WBC 0 <1 /100    Absolute Neutrophils 5.2 1.6 - 8.3 10e3/uL    Absolute Lymphocytes 2.3 0.8 - 5.3 10e3/uL    Absolute Monocytes 0.4 0.0 - 1.3 10e3/uL    Absolute Eosinophils 0.1 0.0 - 0.7 10e3/uL    Absolute Basophils 0.0 0.0 - 0.2 10e3/uL    Absolute Immature Granulocytes 0.0 <=0.4 10e3/uL    Absolute NRBCs 0.0 10e3/uL   XR Chest 2 Views    Narrative    CHEST TWO VIEWS 5/13/2024 4:24 PM     HISTORY: Chest  pain    COMPARISON: 9/7/2023       Impression    IMPRESSION: No acute cardiopulmonary abnormality.     Stable appearance of 3 mm nodular density along the right mid lung.  Findings may reflect a small pulmonary nodule or calcified granuloma.  If further characterization is needed, consider nonemergent follow-up  CT chest exam.    DILLON CLEVELAND MD         SYSTEM ID:  [id number]   Troponin T, High Sensitivity   Result Value Ref Range    Troponin T, High Sensitivity <6 <=14 ng/L       Medications   Lidocaine (LIDOCARE) 4 % Patch 1 patch (1 patch Transdermal $Patch/Med Applied 5/13/24 6802)       Assessments & Plan (with Medical Decision Making)  Linette is a 48-year-old female presenting with concern of shortness of breath and chest pain.  See history and physical exam as above  Pleasant 48-year-old female in no acute distress, is hypertensive with blood pressure of 166/106, and slightly tachycardic with heart rate of 114, otherwise vitally stable and afebrile.  She is not diaphoretic.  Oxygenating at 99% on room air.  She says that her symptoms have almost completely resolved after being here in the ER.  EKG had been obtained on arrival and shows a slight sinus tachycardia with a heart rate of 106 bpm, but no acute ST elevations, ectopy, or emergent arrhythmia.  When compared with previous EKGs, she has always been slightly tachycardic with heart rates just over 100 bpm, and no acute changes are noted on comparison to today's EKG.  Will check labs and chest x-ray  CBC, BMP, and troponin as above.  No acute emergent findings, troponin was less than 6.  Chest x-ray showed a calcified granuloma versus pulmonary nodule in the right lung that does not appear to change.  Otherwise no acute concerning findings.  Will get a second troponin 2 hours after the first  Second troponin remains below 6.  Patient has not had any return in her symptoms while here in the ED.  Do not suspect cardiac cause for the patient's symptoms.  She  is aware of the granuloma/nodule, and says that she also has 1 in her spleen.  Has not noted any problems or recommendations for further evaluation regarding this spot.  Discussed ED return precautions, but advised her to follow-up with her primary provider in clinic.  She also has a GI appointment upcoming, and could have underlying GERD leading to the heartburn symptoms she described today.  She will discuss this with GI provider at the appointment.  Advised to return to the ED if worsening symptoms develop.  All questions answered discharged in stable condition     I have reviewed the nursing notes.    I have reviewed the findings, diagnosis, plan and need for follow up with the patient.           Medical Decision Making  The patient's presentation was of low complexity (an acute and uncomplicated illness or injury).    The patient's evaluation involved:  ordering and/or review of 3+ test(s) in this encounter (see separate area of note for details)    The patient's management necessitated only low risk treatment.        New Prescriptions    No medications on file       Final diagnoses:   Atypical chest pain   Pulmonary nodule   Accelerated hypertension       5/13/2024   [facility] EMERGENCY DEPT       Jessica Don DO  05/13/24 2699

## 2024-05-13 NOTE — ED TRIAGE NOTES
Pt presents with chest pain and shortness of breath episode 30 minutes ago. Pt states pain into bilateral jaw. Pain in left arm also. Pt is a smoker.      Triage Assessment (Adult)       Row Name 05/13/24 1512          Triage Assessment    Airway WDL WDL        Respiratory WDL    Respiratory WDL X  shortness of breath        Skin Circulation/Temperature WDL    Skin Circulation/Temperature WDL WDL        Cardiac WDL    Cardiac WDL X;chest pain  Chest pressure into jaw.        Peripheral/Neurovascular WDL    Peripheral Neurovascular WDL WDL        Cognitive/Neuro/Behavioral WDL    Cognitive/Neuro/Behavioral WDL WDL

## 2024-05-13 NOTE — DISCHARGE INSTRUCTIONS
Your EKG did not show any sign of strain or stress on your heart.  You had troponin levels drawn, which is a cardiac enzyme that would be elevated if you had a heart attack.  This lab test was normal    Follow-up with your primary doctor within 1 to 2 weeks.  They can determine if you need any further testing on an outpatient basis for chest pain, they can talk to about stress test or other evaluation if needed    If you have any return of your symptoms or any worsening symptoms, do not hesitate to return to the emergency room for evaluation

## 2024-05-14 ENCOUNTER — MYC MEDICAL ADVICE (OUTPATIENT)
Dept: GASTROENTEROLOGY | Facility: CLINIC | Age: 49
End: 2024-05-14

## 2024-05-14 ENCOUNTER — VIRTUAL VISIT (OUTPATIENT)
Dept: GASTROENTEROLOGY | Facility: CLINIC | Age: 49
End: 2024-05-14
Payer: COMMERCIAL

## 2024-05-14 DIAGNOSIS — K21.00 GASTROESOPHAGEAL REFLUX DISEASE WITH ESOPHAGITIS WITHOUT HEMORRHAGE: ICD-10-CM

## 2024-05-14 DIAGNOSIS — R14.0 ABDOMINAL BLOATING: ICD-10-CM

## 2024-05-14 DIAGNOSIS — R07.89 ATYPICAL CHEST PAIN: ICD-10-CM

## 2024-05-14 DIAGNOSIS — K59.03 CONSTIPATION DUE TO OPIOID THERAPY: Primary | ICD-10-CM

## 2024-05-14 DIAGNOSIS — T40.2X5A CONSTIPATION DUE TO OPIOID THERAPY: Primary | ICD-10-CM

## 2024-05-14 PROCEDURE — 99214 OFFICE O/P EST MOD 30 MIN: CPT | Mod: 95 | Performed by: NURSE PRACTITIONER

## 2024-05-14 RX ORDER — BUSPIRONE HYDROCHLORIDE 15 MG/1
15 TABLET ORAL 3 TIMES DAILY
COMMUNITY
Start: 2024-05-09 | End: 2024-08-19

## 2024-05-14 RX ORDER — NICOTINE 21 MG/24HR
1 PATCH, TRANSDERMAL 24 HOURS TRANSDERMAL EVERY 24 HOURS
COMMUNITY
Start: 2024-05-01 | End: 2024-08-05

## 2024-05-14 NOTE — PATIENT INSTRUCTIONS
"It was a pleasure taking care of you today.  I've included a brief summary of our discussion and care plan from today's visit below.  Please review this information with your primary care provider.  ______________________________________________________________________    My recommendations are summarized as follows:    As we discussed today, I placed a referral to upper GI endoscopy with BRAVO study.    2. Continue pantoprazole 40 mg twice a day. Resume famotidine 40 mg twice a day. Use TUMs as needed.     3. Resume Miralax and take one cap a day. I sent a prescription for a new constipation medication-linaclotide (Linzess) to take every day before breakfast.    4. Avoid enema.    Return to GI Clinic in  to review your progress.    ______________________________________________________________________    Gastroesophageal reflux  Gastroesophageal reflux, also called \"acid reflux,\" occurs when the stomach contents back up into the esophagus and/or mouth. Occasional reflux is normal and can happen in healthy infants, children, and adults, most often after eating a large meal. Most episodes are brief and do not cause bothersome symptoms or complications.   By contrast, people with gastroesophageal reflux disease (GERD) experience bothersome symptoms or damage to the esophagus as a result of acid reflux. Symptoms of GERD can include heartburn, regurgitation, and difficulty or pain with swallowing.  The main cause of GERD is a transient relaxation or weakening of the lower esophageal sphincter (LES) which allows regurgitation of gastric acid and other gastric contents, including bile, back into the esophagus. The esophageal lining is susceptible to irritation by acid because it does not have the thick mucus protection of the stomach. Some people with GERD do not experience heartburn but may have burning sensation in the mouth, a feeling that food is stuck at any level of the esophagus, or hoarseness in the " morning.  There are a number of predisposing factors associated with GERD, including a hiatal hernia, cigarette smoking, alcohol use, being overweight or obese, and pregnancy. Foods such as citrus fruits, chocolate, caffeinated drinks, fried foods, garlic, onions, spicy foods, and tomato-based foods, such as chili, pizza, and spaghetti sauce, are associated with heartburn symptoms. Consumption of large high-fat meals requires prolonged gastric passage times and the increased stomach pressure may lead to movement of hydrochloric acid from the stomach into the esophagus. Additionally, lying prone after a meal promotes backflow of stomach contents and the development of symptoms.      Lifestyle modifications for gastroesophageal reflux disease (GERD).   1. Change your eating habits.  -- It's best to eat several small meals instead of two or three large meals.  -- After you eat, wait 2 to 3 hours before you lie down. Late-night snacks aren't a good idea.   -- Chocolate, mint, and alcohol can make GERD worse. They relax the valve between the esophagus and the stomach.  -- Spicy foods, foods that have a lot of acid (like tomatoes and oranges), and coffee can make GERD symptoms worse in some people. If your symptoms are worse after you eat a certain     2. Do not smoke or chew tobacco. Saliva helps to neutralize refluxed acid, and smoking reduces the amount of saliva in the mouth and throat. Smoking also lowers the pressure in the lower esophageal sphincter and provokes coughing, causing frequent episodes of acid reflux in the esophagus.     3. If you have GERD symptoms at night, raise the head of your bed 6 in. (15 cm) to 8 in. (20 cm) by putting the frame on blocks or placing a foam wedge under the head of your mattress. (Adding extra pillows does not work.)  4. Avoid or reduce pressure on your stomach. Don't wear tight clothing around your middle.  5. Lose weight if you need to. Losing just 5 to 10 pounds can help.         ______________________________________________________________________    Who do I call with any questions after my visit?  Please be in touch if there are any further questions that arise following today's visit.  There are multiple ways to contact your gastroenterology care team.      During business hours, you may reach a Gastroenterology nurse at [phone], option 3.     To schedule or reschedule an appointment, please call [phone].   To schedule your imaging studies (CT, MRI, ultrasound)  call [phone] (or toll-free # [phone])  To schedule your lab work at [facility], please call [phone]    You can always send a secure message through [email or web address].  [email or web address] messages are answered by your nurse or doctor typically within 24 hours.  Please allow extra time on weekends and holidays.      For urgent/emergent questions after business hours, you may reach the on-call GI Fellow by contacting the [facility]  at [phone].    In order for your refill to be processed in a timely fashion, it is your responsibility to ensure you follow the recommendations from your provider regarding your laboratory studies and follow up appointments.       How will I get the results of any tests ordered?    You will receive all of your results.  If you have signed up for [employer], any tests ordered at your visit will be available to you after your physician reviews them.  Typically this takes 1-2 weeks.  If there are urgent results that require a change in your care plan, your physician or nurse will call you to discuss the next steps.   What is [email or web address]?  [email or web address] is a secure way for you to access all of your healthcare records from the [facility].  It is a web based computer program, so you can sign on to it from any location.  It also allows you to send secure messages to your care team.  I recommend signing up for [email or web address] access if you have not already done  so and are comfortable with using a computer.    How to I schedule a follow-up visit?  If you did not schedule a follow-up visit today, please call [phone] to schedule a follow-up office visit.      Sincerely,  CHAPIN Ponce,  [facility],  Division of Gastroenterology   ([facility])

## 2024-05-15 ENCOUNTER — MYC MEDICAL ADVICE (OUTPATIENT)
Dept: GASTROENTEROLOGY | Facility: CLINIC | Age: 49
End: 2024-05-15
Payer: COMMERCIAL

## 2024-05-15 ENCOUNTER — VIRTUAL VISIT (OUTPATIENT)
Dept: PSYCHOLOGY | Facility: CLINIC | Age: 49
End: 2024-05-15
Payer: COMMERCIAL

## 2024-05-15 DIAGNOSIS — F41.1 GENERALIZED ANXIETY DISORDER: ICD-10-CM

## 2024-05-15 DIAGNOSIS — F33.1 MODERATE EPISODE OF RECURRENT MAJOR DEPRESSIVE DISORDER (H): Primary | ICD-10-CM

## 2024-05-15 PROCEDURE — 90834 PSYTX W PT 45 MINUTES: CPT | Mod: 95 | Performed by: COUNSELOR

## 2024-05-15 RX ORDER — FAMOTIDINE 40 MG/1
40 TABLET, FILM COATED ORAL 2 TIMES DAILY
Qty: 60 TABLET | Refills: 1 | Status: SHIPPED | OUTPATIENT
Start: 2024-05-15

## 2024-05-15 NOTE — PROGRESS NOTES
Answers submitted by the patient for this visit:  Patient Health Questionnaire (Submitted on 5/14/2024)  If you checked off any problems, how difficult have these problems made it for you to do your work, take care of things at home, or get along with other people?: Extremely difficult  PHQ9 TOTAL SCORE: 24          [facility]                                     Progress Note    Patient Name: Radha Beckwith  Date: 5/15/24         Service Type: Individual      Session Start Time: 12:00pm Session End Time: 12:50pm     Session Length: 50     Session #: 36    Attendees: Client    Service Modality:  Video Visit:      Provider verified identity through the following two step process.  Patient provided:  Patient is known previously to provider    Telemedicine Visit: The patient's condition can be safely assessed and treated via synchronous audio and visual telemedicine encounter.      Reason for Telemedicine Visit: Patient convenience (e.g. access to timely appointments / distance to available provider)    Originating Site (Patient Location): Patient's home    Distant Site (Provider Location): Provider Remote Setting- Home Office    Consent:  The patient/guardian has verbally consented to: the potential risks and benefits of telemedicine (video visit) versus in person care; bill my insurance or make self-payment for services provided; and responsibility for payment of non-covered services.     Patient would like the video invitation sent by:  My Chart    Mode of Communication:  Video Conference via [facility]well    Distant Location (Provider):  On-site    As the provider I attest to compliance with applicable laws and regulations related to telemedicine.    DATA  Interactive Complexity: No  Crisis: No        Progress Since Last Session (Related to Symptoms / Goals / Homework):   Symptoms: Worsening .    Homework: Completed in session      Episode of Care Goals: Satisfactory progress - MAINTENANCE (Working  "to maintain change, with risk of relapse); Intervened by continuing to positively reinforce healthy behavior choice      Current / Ongoing Stressors and Concerns:   The client stated she's feeling really \"sick of life and pain.\" Stated she doesn't have any SI or plans.    Stated her boyfriend has been ornery lately. He's been really standoffish towards her.   Client was in the ER this week because she thought she was having a heart attack. All her labs came back normal. Stated her boyfriend didn't come to the ER and just told her to keep him posted.   Stated she's having a lot of balance issues.   Stated she also had her gastro appointment this week too and are going to do another endoscopy.         Treatment Objective(s) Addressed in This Session:   Increase interest, engagement, and pleasure in doing things  Feel less tired and more energy during the day        Intervention:   Actively listened and reflected back while the client was discussing her worries. Talked about the stressors she's experiencing that might be contributing to some of the physical symptoms she's experiencing. Talked about how the client can talk with her boyfriend about her needs and wants and fears. Encouraged her to talk to him soon. Talked about what she can do for self care and if she needs to talk with her doctors about recent symptoms.          Assessments completed prior to visit:  The following assessments were completed by patient for this visit:  PHQ9:       3/27/2024     8:50 PM 4/10/2024     1:59 PM 4/17/2024     1:20 PM 4/22/2024     3:31 PM 5/2/2024     1:10 PM 5/9/2024    12:29 PM 5/14/2024     2:05 PM   PHQ-9 SCORE   PHQ-9 Total Score MyChart 22 (Severe depression) 22 (Severe depression) 24 (Severe depression) 24 (Severe depression) 24 (Severe depression) 24 (Severe depression) 24 (Severe depression)   PHQ-9 Total Score 22 22 24 24 24 24 24     GAD7:       1/18/2024     2:56 PM 2/5/2024     6:27 PM 2/19/2024     3:39 PM " 3/12/2024     8:32 PM 3/27/2024     8:51 PM 4/17/2024     1:23 PM 5/2/2024     1:11 PM   BO-7 SCORE   Total Score 20 (severe anxiety) 21 (severe anxiety) 19 (severe anxiety) 20 (severe anxiety) 19 (severe anxiety) 21 (severe anxiety) 21 (severe anxiety)   Total Score 20    20 21 19 20 19 21    21 21    21     PROMIS 10-Global Health (all questions and answers displayed):       11/26/2023    10:43 PM 2/27/2024     6:40 PM 3/12/2024     8:35 PM 3/27/2024     8:53 PM 4/2/2024     4:38 PM 4/10/2024     1:58 PM 4/17/2024     1:28 PM   PROMIS 10   In general, would you say your health is: Fair Fair Poor Poor Poor Poor Poor   In general, would you say your quality of life is: Poor Fair Poor Poor Poor Poor Poor   In general, how would you rate your physical health? Poor Poor Poor Poor Poor Poor Poor   In general, how would you rate your mental health, including your mood and your ability to think? Poor Fair Poor Poor Poor Poor Poor   In general, how would you rate your satisfaction with your social activities and relationships? Poor Poor Poor Poor Poor Poor Poor   In general, please rate how well you carry out your usual social activities and roles Fair Poor Poor Poor Poor Fair Poor   To what extent are you able to carry out your everyday physical activities such as walking, climbing stairs, carrying groceries, or moving a chair? A little A little A little A little A little A little A little   In the past 7 days, how often have you been bothered by emotional problems such as feeling anxious, depressed, or irritable? Always Always Always Always Always Always Always   In the past 7 days, how would you rate your fatigue on average? Severe Severe Severe Severe Severe Severe Very severe   In the past 7 days, how would you rate your pain on average, where 0 means no pain, and 10 means worst imaginable pain? 7 7 8 7 7 7 7   In general, would you say your health is: 2 2 1 1 1 1 1   In general, would you say your quality of life  is: 1 2 1 1 1 1 1   In general, how would you rate your physical health? 1 1 1 1 1 1 1   In general, how would you rate your mental health, including your mood and your ability to think? 1 2 1 1 1 1 1   In general, how would you rate your satisfaction with your social activities and relationships? 1 1 1 1 1 1 1   In general, please rate how well you carry out your usual social activities and roles. (This includes activities at home, at work and in your community, and responsibilities as a parent, child, spouse, employee, friend, etc.) 2 1 1 1 1 2 1   To what extent are you able to carry out your everyday physical activities such as walking, climbing stairs, carrying groceries, or moving a chair? 2 2 2 2 2 2 2   In the past 7 days, how often have you been bothered by emotional problems such as feeling anxious, depressed, or irritable? 5 5 5 5 5 5 5   In the past 7 days, how would you rate your fatigue on average? 4 4 4 4 4 4 5   In the past 7 days, how would you rate your pain on average, where 0 means no pain, and 10 means worst imaginable pain? 7 7 8 7 7 7 7   Global Mental Health Score 4 6 4 4 4 4    4 4    4   Global Physical Health Score 7 7 7 7 7 7    7 6    6   PROMIS TOTAL - SUBSCORES 11 13 11 11 11 11    11 10    10     [place] Suicide Severity Rating Scale (Lifetime/Recent)      10/4/2022    11:00 AM 5/13/2024     3:13 PM   [place] Suicide Severity Rating (Lifetime/Recent)   Q1 Wished to be Dead (Past Month) no 0-->no   Q2 Suicidal Thoughts (Past Month) no 0-->no   Q3 Suicidal Thought Method no    Q4 Suicidal Intent without Specific Plan no    Q5 Suicide Intent with Specific Plan yes    Q6 Suicide Behavior (Lifetime) yes 0-->no   Within the Past 3 Months? no    Level of Risk per Screen high risk no risks indicated         ASSESSMENT: Current Emotional / Mental Status (status of significant symptoms):   Risk status (Self / Other harm or suicidal ideation)   Patient denies current fears or concerns for  personal safety.   Patient denies current or recent suicidal ideation or behaviors.   Patient denies current or recent homicidal ideation or behaviors.   Patient denies current or recent self injurious behavior or ideation.   Patient denies other safety concerns.   Patient reports there has been no change in risk factors since their last session.     Patient reports there has been no change in protective factors since their last session.     Recommended that patient call 911 or go to the local ED should there be a change in any of these risk factors.     Appearance:   Appropriate    Eye Contact:   Good    Psychomotor Behavior: Normal    Attitude:   Cooperative    Orientation:   All   Speech    Rate / Production: Normal/ Responsive Normal     Volume:  Normal    Mood:    Depressed  Normal   Affect:    Appropriate  Tearful   Thought Content:  Clear  Rumination    Thought Form:  Coherent    Insight:    Good      Medication Review:   No changes to current psychiatric medication(s)     Medication Compliance:   Yes     Changes in Health Issues:   None reported     Chemical Use Review:   Substance Use: Chemical use reviewed, no active concerns identified      Tobacco Use: No change in amount of tobacco use since last session.  Patient declined discussion at this time    Diagnosis:  1. Moderate episode of recurrent major depressive disorder (H)    2. Generalized anxiety disorder          Collateral Reports Completed:   Not Applicable    PLAN: (Patient Tasks / Therapist Tasks / Other)  Continue to continue to work on relaxation skills and discuss her worries with her doctor.          Ricarda Bhatia, [facility]                                                         ______________________________________________________________________    Individual Treatment Plan    Patient's Name: Radha Beckwith  YOB: 1975    Date of Creation: 6/28/23  Date Treatment Plan Last Reviewed/Revised: 5/9/24    DSM5 Diagnoses:  296.32 (F33.1) Major Depressive Disorder, Recurrent Episode, Moderate _  Psychosocial / Contextual Factors: living with boyfriend, memory issues  PROMIS (reviewed every 90 days):     Referral / Collaboration:  Referral to another professional/service is not indicated at this time..    Anticipated number of session for this episode of care: 9-12 sessions  Anticipation frequency of session:  as needed  Anticipated Duration of each session: 38-52 minutes  Treatment plan will be reviewed in 90 days or when goals have been changed.       MeasurableTreatment Goal(s) related to diagnosis / functional impairment(s)  Goal 1: Patient will increase communication skills with family members.    Objective #A (Patient Action)    Patient will learn & utilize at least 2 assertive communication skills weekly.  Status: Continued - Date(s): 5/9/24    Intervention(s)  Therapist will teach emotional regulation skills. distress tolerance skills, interpersonal effectiveness skills, emotion regluation skills, mindfulness skills, radical acceptance. Therapist will teach client how to ID body cues for anxiety, anxiety reduction techniques, how to ID triggers for depression and anxiety- decrease reactivity/eliminate, lifestyle changes to reduce depression and anxiety, communication skills, explore cognitive beliefs and help client to develop healthy cognitive patterns and beliefs.    Objective #B  Patient will use thought-stopping strategy daily to reduce intrusive thoughts.  Status: Continued - Date(s): 5/9/24    Intervention(s)  Therapist will teach emotional regulation skills. distress tolerance skills, interpersonal effectiveness skills, emotion regluation skills, mindfulness skills, radical acceptance. Therapist will teach client how to ID body cues for anxiety, anxiety reduction techniques, how to ID triggers for depression and anxiety- decrease reactivity/eliminate, lifestyle changes to reduce depression and anxiety, communication  skills, explore cognitive beliefs and help client to develop healthy cognitive patterns and beliefs.      Goal 2: Patient will decrease depression symptoms.      Objective #A (Patient Action)    Status: Continued - Date(s): 5/9/24    Patient will Increase interest, engagement, and pleasure in doing things  Decrease frequency and intensity of feeling down, depressed, hopeless  Improve quantity and quality of night time sleep / decrease daytime naps  Feel less tired and more energy during the day   Improve diet, appetite, mindful eating, and / or meal planning  Identify negative self-talk and behaviors: challenge core beliefs, myths, and actions  Improve concentration, focus, and mindfulness in daily activities   Feel less fidgety, restless or slow in daily activities / interpersonal interactions.    Intervention(s)  Therapist will teach emotional regulation skills. distress tolerance skills, interpersonal effectiveness skills, emotion regluation skills, mindfulness skills, radical acceptance. Therapist will teach client how to ID body cues for anxiety, anxiety reduction techniques, how to ID triggers for depression and anxiety- decrease reactivity/eliminate, lifestyle changes to reduce depression and anxiety, communication skills, explore cognitive beliefs and help client to develop healthy cognitive patterns and beliefs.    Objective #B  Patient will identify three distraction and diversion activities and use those activities to decrease level of anxiety  .    Status: Continued - Date(s):  5/9/24    Intervention(s)  Therapist will teach emotional regulation skills. distress tolerance skills, interpersonal effectiveness skills, emotion regluation skills, mindfulness skills, radical acceptance. Therapist will teach client how to ID body cues for anxiety, anxiety reduction techniques, how to ID triggers for depression and anxiety- decrease reactivity/eliminate, lifestyle changes to reduce depression and anxiety,  communication skills, explore cognitive beliefs and help client to develop healthy cognitive patterns and beliefs.      Patient has reviewed and agreed to the above plan.      ERIK Keller

## 2024-05-16 ENCOUNTER — THERAPY VISIT (OUTPATIENT)
Dept: PHYSICAL THERAPY | Facility: CLINIC | Age: 49
End: 2024-05-16
Attending: PREVENTIVE MEDICINE
Payer: COMMERCIAL

## 2024-05-16 DIAGNOSIS — M54.2 CERVICALGIA: Primary | ICD-10-CM

## 2024-05-16 PROCEDURE — 97140 MANUAL THERAPY 1/> REGIONS: CPT | Mod: GP | Performed by: PHYSICAL THERAPIST

## 2024-05-20 ENCOUNTER — TELEPHONE (OUTPATIENT)
Dept: GASTROENTEROLOGY | Facility: CLINIC | Age: 49
End: 2024-05-20
Payer: COMMERCIAL

## 2024-05-20 NOTE — TELEPHONE ENCOUNTER
PA Initiation    Medication:  Linzess  Insurance Company:    Pharm[facility] Filling the [facility]   Filling Pharmacy Phone:  [phone]  Filling Pharmacy Fax:  [phone]  Start Date:  05/14/2024

## 2024-05-23 ENCOUNTER — VIRTUAL VISIT (OUTPATIENT)
Dept: PSYCHOLOGY | Facility: CLINIC | Age: 49
End: 2024-05-23
Payer: COMMERCIAL

## 2024-05-23 DIAGNOSIS — F33.1 MODERATE EPISODE OF RECURRENT MAJOR DEPRESSIVE DISORDER (H): Primary | ICD-10-CM

## 2024-05-23 DIAGNOSIS — F41.1 GENERALIZED ANXIETY DISORDER: ICD-10-CM

## 2024-05-23 PROCEDURE — 90834 PSYTX W PT 45 MINUTES: CPT | Mod: 95 | Performed by: COUNSELOR

## 2024-05-23 ASSESSMENT — ANXIETY QUESTIONNAIRES
5. BEING SO RESTLESS THAT IT IS HARD TO SIT STILL: NEARLY EVERY DAY
GAD7 TOTAL SCORE: 21
IF YOU CHECKED OFF ANY PROBLEMS ON THIS QUESTIONNAIRE, HOW DIFFICULT HAVE THESE PROBLEMS MADE IT FOR YOU TO DO YOUR WORK, TAKE CARE OF THINGS AT HOME, OR GET ALONG WITH OTHER PEOPLE: EXTREMELY DIFFICULT
7. FEELING AFRAID AS IF SOMETHING AWFUL MIGHT HAPPEN: NEARLY EVERY DAY
4. TROUBLE RELAXING: NEARLY EVERY DAY
8. IF YOU CHECKED OFF ANY PROBLEMS, HOW DIFFICULT HAVE THESE MADE IT FOR YOU TO DO YOUR WORK, TAKE CARE OF THINGS AT HOME, OR GET ALONG WITH OTHER PEOPLE?: EXTREMELY DIFFICULT
7. FEELING AFRAID AS IF SOMETHING AWFUL MIGHT HAPPEN: NEARLY EVERY DAY
2. NOT BEING ABLE TO STOP OR CONTROL WORRYING: NEARLY EVERY DAY
3. WORRYING TOO MUCH ABOUT DIFFERENT THINGS: NEARLY EVERY DAY
GAD7 TOTAL SCORE: 21
6. BECOMING EASILY ANNOYED OR IRRITABLE: NEARLY EVERY DAY
1. FEELING NERVOUS, ANXIOUS, OR ON EDGE: NEARLY EVERY DAY
GAD7 TOTAL SCORE: 21
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
## 2024-05-26 ASSESSMENT — PAIN SCALES - PAIN ENJOYMENT GENERAL ACTIVITY SCALE (PEG)
INTERFERED_ENJOYMENT_LIFE: 8
INTERFERED_GENERAL_ACTIVITY: 8
PEG_TOTALSCORE: 7.67
AVG_PAIN_PASTWEEK: 7

## 2024-05-28 ENCOUNTER — VIRTUAL VISIT (OUTPATIENT)
Dept: PSYCHOLOGY | Facility: OTHER | Age: 49
End: 2024-05-28
Payer: COMMERCIAL

## 2024-05-28 DIAGNOSIS — F33.1 MODERATE EPISODE OF RECURRENT MAJOR DEPRESSIVE DISORDER (H): Primary | ICD-10-CM

## 2024-05-28 DIAGNOSIS — F41.1 GENERALIZED ANXIETY DISORDER: ICD-10-CM

## 2024-05-28 PROCEDURE — 90834 PSYTX W PT 45 MINUTES: CPT | Mod: 95 | Performed by: COUNSELOR

## 2024-05-28 NOTE — PROGRESS NOTES
[facility]     Date of visit: 5/29/2024      Assessment:   Radha Beckwith is a 48 year old female with a past medical history significant for chronic bilateral low back pain, depression, SVT, opioid dependence in remission, anxiety, tobacco use, GERD, restless legs who presents with complaints of neck pain, mid and low back.      Low back pain - Onset of pain within last 2 years. Pain is mostly axial, though she does have intermittent radicular leg symptoms, occasionally extends below the knee. On exam, positive facet loading and positive KATHY, sacral thrust and Yeoman's bilaterally. Positive myofascial TTP, negative SLR and neuro exam WNL. Etiology is likely associated with multiple factors, including underlying degenerative changes of lumbar spine, facet and SIJ mediated components, with prominent overlying myofascial component.   Neck/thoracic back pain - Onset of pain within last 2 years. On exam, positive for myofascial TTP, most notably in lower thoracic paraspinals. Etiology is likely associated with multiple factors, including underlying degenerative changes, with prominent overlying myofascial component.      Assigned to [place] nursing team.     Visit Diagnoses:  1. Arthropathy of cervical facet joint    2. Chronic neck pain    3. Myofascial pain    4. Chronic bilateral thoracic back pain    5. Chronic bilateral low back pain with bilateral sciatica    6. Bilateral hand pain        Plan:  Diagnosis reviewed, treatment option addressed, and risk/benifits discussed.  Self-care instructions given.  I am recommending a multidisciplinary treatment plan to help this patient better manage their pain.      Pain Physical Therapy:  Currently engaging in PT for low back/SI and neck pain. Continue therapy and activity as tolerated at home.      Pain Psychologist to address relaxation, behavioral change, coping style, and other factors important to improvement.  YES - referral to Karen  Jose F PhD for mindfulness and pain coping strategies.      Diagnostic Studies:  None      Medication Management:   Duloxetine -  Current dose 60 mg in morning and 30 mg at bedtime. Appreciates benefit, no new/significant concerns for side effects.   Gabapentin - 300 mg in morning. Discussed taper down/off at last visit, determined she appreciates benefit with morning dose, no side effects. Continue with current dose.   Muscle relaxants:   Continue baclofen 20 mg twice daily.  Appreciates some degree of benefit. No side effects. Recommend adding third dose, start 10 mg in afternoon x 5 days, then increase to 20 mg three times daily. Monitor for enhanced benefit, cautioned about potential sedation effects.   Flexeril - 5-10 mg twice daily as needed. PCP managing. Recommend reducing use to once daily as needed if helpful on days with increased pain.   Allow 4-6 hours in between all muscle relaxant doses, do not mix with alcohol.   [place] - [place] at last visit on 1/3/24. No clear benefit for pain with daytime products, some benefit at night with products used at bedtime.       Potential procedures:   Cervical JASE scheduled with Dr. Ivory on 4/19/24, reports benefit for 1-2 days after injection, then pain returned to baseline.   bilateral cervical 3,4,5 medial branch nerve block #1 ordered today, with plans to proceed to radiofrequency ablation. Positive bilateral cervical facet loading with rotation and rotation/extension on exam.   Referral to Sports Medicine for wrist/hand injection evaluation. Scheduling phone number is [phone].      Other Orders/Referrals:   Recommend using TENS unit a few times throughout the day as needed, particularly when engaging in activities that may increase pain.      Follow up with JEANNIE Schroeder CNP in around 6-8 weeks, or sooner if needed.       Review of Electronic Chart: Today I have also reviewed available medical information  in the patient's medical record at [facility] ([facility]) and Care Everywhere (if available), including relevant provider notes, laboratory work, and imaging.     Jenny Landrum, NAOMI, APRN, BENJAMIN-C  [facility]     -------------------------------------------------    Subjective:    Chief complaint:   Chief Complaint   Patient presents with    Pain       Interval history:  Radha Beckwith is a 48 year old female last seen on 4/9/24.  They are a patient of mine seen in follow up.     Recommendations/plan at the last visit included:  Pain Physical Therapy:  Currently engaging in PT for low back/SI and neck pain. Continue therapy and activity as tolerated at home.      Pain Psychologist to address relaxation, behavioral change, coping style, and other factors important to improvement.  NO     Diagnostic Studies:  None      Medication Management:   Duloxetine -  Current dose 60 mg in morning and 30 mg at bedtime. Appreciates benefit, no new/significant concerns for side effects. Updated prescriptions for 60 mg and 30 mg capsules for 90 day supply sent into pharmacy today.   Gabapentin - 300 mg in morning. Discussed taper down/off at last visit, determined she appreciates benefit with morning dose, no side effects. Continue with current dose. No refills today, advised to contact clinic when needed.   Muscle relaxants:   Continue baclofen 20 mg twice daily.  Appreciates some degree of benefit. No side effects. Will plan to continue current dose for now.  Flexeril - continue 5-10 mg twice daily as needed. She takes in morning and afternoon, appreciates benefit for significant myofascial tightness. No side effects. PCP managing.   Allow 4-6 hours in between all muscle relaxant doses, avoid concurrent alcohol use.   [place] at last visit on 1/3/24. No clear benefit for pain with daytime products, some benefit at night with products used at bedtime.    Reports PCP advised alprazolam will not be continued (current dose 0.5 mg BID) due to medical cannabis use. She has appointment with new psychiatrist on 4/12/24, encouraged her to discuss continuation versus alternatives with provider. Also, could consider discussion with PCP, if she finds alprazolam benefit for anxiety more valuable than medical cannabis for pain. I am uncertain if medical cannabis program certification can be cancelled, but I am able to see visits to dispensary/products purchased.      Potential procedures:   Defer to Dr. Rosario at this time. [facility] with [facility] on 4[facility]24. Addressed my concerns for fragmented care with neck and low back procedures. Advised consolidating care for better continuity, specifically injections, and recommend she decide if she would like to continue scheduling with Dr. Ivory (closer to home in [place]) versus Pain Clinic (prior SI joint injections with Dr. Reynaga).   Message sent to Dr. Rosario to collaborate on her care.   Cervical JASE scheduled with Dr. Ivory on 4/19/24 (referred by Dr. Arteaga for injection in October 2023, delays with scheduling).      Other Orders/Referrals:   Recommend using TENS unit a few times throughout the day as needed, particularly when engaging in activities that may increase pain.      Follow up with JEANNIE Schroeder CNP in around 6-8 weeks, or sooner if needed.     Since her last visit, Radha Beckwith reports:  -Her pain overall is about the same as last visit, no significant changes in pain symptoms.   -She is trying to find a new PCP right now, does not feel like she has a good dynamic with provider anymore.   -She had a fight with boyfriend last night, states she does not think she has anything wrong with her. This is very invalidating for her and difficult to process.   -She reports seeing a MH counselor now, which has been helpful for supporting her chronic health conditions.   -Cervical JASE recently on 4/19/24 with  Dr. Ivory, benefit x 1-2 days but then pain returned to baseline.   -She reports pain goes all the way down into spine.   -She reports pain and numbness radiating into jaw after lying down for a period of time, this pain occurs when she tries to get up.   -She has recently seen Dr. Rosario for SI dysfunction.   -She is open to trial TID dosing with baclofen.   -Also takes cyclobenzaprine PRN.  She continues to have neck pain notably with rotation and extension.   -She is interested in CMBB/RFA.     Pain Information:   Pain rating: averages 7/10 on a 0-10 scale.      Interval history from last visit on 4/9/24:  -She reports pain continues to persist, worse to some extent.   -Low back pain continues to persist, she is wondering about alternative procedures.   -She's had SIJ injections x 2. First injection on 5/16/23, reports 75% improvement for 3 months. Then repeat on 11/2/23, did not appreciate as much benefit.   -She wonders if SI joint injection on 11/2/23 was more helpful than she thought. It is difficult for her to determine how much benefit she actually appreciated from repeat SI joint injections,   -She was scheduled for in person visit, but was not feeling well, has sore throat, and asked to convert to video visit.   -She reports increased pain/sensitivity in head/neck and face in the morning, also notes hot sensation.   -She continues to engage in PT. Notes they will be focusing on this during therapy.   -She is scheduled for cervical JASE on 4/19/24 with Dr. Ivory.   -She is also following [facility], who referred to Dr. Ivory for procedures.   -Duloxetine dose 60 mg in morning and 30 mg in evening. Reports pain benefit, no new/major concerns for side effects.   -Gabapentin taper discussed at last visit, she continues to take 300 mg in morning and appreciates benefit with daily dosing.   -She reports PCP expressed concerns for continuing alprazolam with recent medical cannabis  "certification. She was advised PCP would no longer prescribe.   -[place] has been trying products, but she is having some side effects.   -She reports some degree of pain relief with products at night, but daytime products do not help as much.   -She will be seeing new psychiatrist, initial appt on 4/12/24. She plans to discuss continuation of xanax with new provider.   Pain Information:              Pain rating: averages 7/10 on a 0-10 scale.        Interval history from last visit on 2/14/24:  -Recommended duloxetine increase at last visit, 60 mg in morning and 30 mg in evening. She thinks she is appreciating some degree of benefit, but she is having different \"funky\" symptoms coming up with med adjustments. She does not think these \"funky\" symptoms are necessarily related to duloxetine, but in general.   -She reports having \"buzzing/tingling\" sensation for about 10 minutes after stopping TENS unit, also having some shooting pains in back that are new.   -Certified for medical cannabis at last visit, reports she has been to dispensary and is taking \"Lary\" pills in morning and evening. She appreciates benefit.   -She went to ENT on Monday for tinnitus and buzzing in face. She states that this provider thinks this is more so related to cervicalgia.   -She is currently engaging in pain PT 2 x week, notes ENT referred her for PT focusing on neck.   -She is working on sliding scale disability, wants to make sure that her chart is updated accordingly.   -She reports right middle finger has been swollen and can't move it, with prominent ringing in her ears, \"knot\" on neck was really sore, states she felt like \"blood was coming back into body.\"   -She states that the tingling and burning sensation in face, states this is new for her.   -She has been self tapering down on gabapentin, started around the time duloxetine was increased. We had discussed holding off on making too many " changes all at once, she researched how to taper off. She has not appreciated much difference in pain with tapering down on gabapentin. Current dose is 300 in AM and 300 mg at bedtime. She states she has been at this dose for 2 weeks. She is open to taper down and then monitor carefully for 2 weeks.   Pain Information:              Pain rating: averages 7/10 on a 0-10 scale.        Interval history from last visit on 1/3/24:  -Her pain has not improved since last visit. She notes worsening neck pain and had ED visit on 11/13/23.   -Repeat SIJ injections on 11/2/23 with Dr. Reynaga. She reports injection was not helpful.   -Gabapentin increased at last visit, she reports marginal benefit. Will likely taper off at next visit, pending outcome with duloxetine increase.   -Discussed medical cannabis at last visit. She states she tried OTC cannabis product from [employer] shop that had delta 9, did not like the psychoactive effects and no pain benefit. However, she [place] open [place] program[place] which we discussed is more controlled and targeting pain.   -She states she is restarting PT with new provider. She has been rescheduled numerous times with different people recently, which has been frustrating.   -she is dealing with some other health concerns right now, states there was finding in her ear on recent imaging.   -She follows with [facility], Dr. Arteaga, recently referred to Dr. Rosario for nonsurgical tx of low back pain. Will defer tx recs for low back to [facility] at this time.   Pain Information:              Pain rating: averages 7/10 on a 0-10 scale.        Interval history from last visit on 10/4/23:  -She has been struggling with pain since last visit.  -She had ED visit prior to and following last visit.   -She reports second ED visit on 9/12/23 was a horrible experience due to pain and treatment received.   -She continues to have neck and headache pain.  -She has wrist pain, particularly with typing, and extends into hand.   -She  "had EMG done since last visit that was negative for CTS.   -She is very frustrated without having answer to why she is having significantly increased pain.   -She is having posterior neck pain with radiation into posterior head.   -She reports having blurred vision, headache pain alternatives sides.   -She states the side of her head \"feels funky,\" somewhat similar to Bell's palsy she had in the past.   -She was started on gabapentin 300 mg TID at last visit, feels like pain has improved slightly, particularly when she is at rest.   -She does notice some daytime sedation since starting gabapentin but otherwise no side effects.   -She is open to increasing gabapentin dosage.   -She is also potentially interested in medical cannabis.   Pain Information:              Pain rating: averages 8/10 on a 0-10 scale.        Interval history from last visit on 9/7/23:  -her pain is worse than it was at last visit.   -She reports neck pain has worsened, also having \"new\" pain in mid back that is constant.   -She does not notice any change in pain levels with bending/twisting.   -She had ED visit yesterday, largely unremarkable workup.   -She reports neck pain has been worsening since July when she saw pain PT.   -She continues to work with pain PT with [facility], last visit 9/5/23.   -She states she was doing some of the PT exercises wrong and was finally corrected at this last pain PT visit.   -She reports she went to chiropractor last week, had massage and adjustment, no change in pain levels.   -She is having pain radiating into BUE,   -Cervical CT yesterday demonstrated mild C4-5 discogenic pathology.  -She reports having ringing in ears since June.   -She states she is keeping diary of pain symptoms.   -She is open to trial gabapentin.   -She tried Lyrica in the past that caused weird dreams.   Pain Information:              Pain rating: averages 8/10 on a 0-10 scale.        Interval history from last visit on " "7/13/23:  -her pain is about the same as it was at last visit in neck, low back is improved since SI joint to an extent.   -She was referred to pain PT at last visit, has been seeing [facility].   -She states she is \"extremely sensitive,\" so touching is limited during visits.   -She is doing some neck stretching.   -She has not appreciated profound difference from pain PT yet.   -She had SI joint injection with Dr. Reynaga on 5/16/23.  -She reports substantial improvement in pain for about a month, then started to have some pain symptoms return.   -She continued to appreciate some benefit from injection for a while longer, less intensity of pain.   -She notes she had migraines x 2 weeks, which she wonders if is attributed to stress of having her son home.   -She also had teeth removal.   -She is taking baclofen 10 mg BID  -She has not appreciated much improvement in neck and low back myofascial pain.   -She continues to take flexeril as well, notes this is helpful when she is very stiff in morning.   -She is open to baclofen increase.   -She understands she needs to space out muscle relaxants doses by 4 hours.   -She is using TENS unit a little bit, will be taking to pain PT.   Pain Information:              Pain rating: averages 7/10 on a 0-10 scale.           HPI from initial visit with me on 4/26/23:  Radha Beckwith is a 47 year old female presents with a chief complaint of neck pain/upper back and radicular low back pain.      The pain has been present for 1.5-2 years current pain episode.    The pain is Severe Pain (6) in severity.    The pain is described as dull ache in mid back, cramping, sharp, pressure, throbbing, burning in low back, radicular pain into hips, legs, butt, stops above knee.   The pain is alleviated by position changes, massage/heat device, exercise.    It is exacerbated by prolonged sitting, pain is constant but fluctuates with activity, prolonged standing and walking.  "   Modalities that have been utilized in the past which were helpful include PT.    Things that were not helpful, but tried ,include back brace, LESI x 1, TPI (?), TENS unit (OTC from [employer]).    The patient has never tried pain PT, SI joint/facet.  The patient otherwise denies bowel or bladder incontinence, parasthesias, weakness, saddle anesthesia, unintentional weight loss, or fever/chills/sweats.   Radha Beckwith has been seen at a pain clinic in the past.  She sees addiction med in [place], [facility] with Dr. Ivory.   -She is not currently working due to pain, off work for 1.5 years.   -She looked into disability but was advised by  that they would not take on her case.   -She had L5-S1 JASE 7/2022 with Dr. Ivory in [place].   -She saw neurosurgery in the past as well in 2021.   -She saw pain provider through [facility], did not care for provider.   -She has 1 kid, 25 year old boy, [employer] and .            Current Pain Treatments:     Medications:               Suboxone 2-0.5 mg daily (weaning off)              Duloxetine 60 mg in morning and 30 mg at bedtime               Ropinirole 1 mg at bedtime               Cyclobenzaprine 5-10 mg QD PRN               Baclofen 20 mg TID              Gabapentin 300 mg in morning.      2. Other therapies:               TENS              PT - low back/SI and recent referral for neck pain             CMBB/RFA - MBB #1 ordered today         Current MME: N/A     Review of [place] Prescription Monitoring Program (): No concern for abuse or misuse of controlled medications based on this report. Reviewed - appears appropriate.      Past pain treatments:  SI joint injection 5/16/23 -significant benefit x1 month, then diminishing efficacy  KINDRA on 4/19/24 with Dr. Ivory - marginal benefit     Medications:  Current Outpatient Medications   Medication Sig Dispense Refill    acetaminophen (TYLENOL) 500 MG tablet Take 1,000 mg by mouth every 6 hours as  needed for mild pain      ALPRAZolam (XANAX) 0.5 MG tablet       baclofen (LIORESAL) 20 MG tablet Current dose 20 mg BID. Start 10 mg in afternoon x 5-7 days, then increase to 20 mg TID. 90 tablet 1    bisacodyl (DULCOLAX) 5 MG EC tablet Take 1 tablet (5 mg) by mouth every other day Take every other day. If no bowel movement for 2 or more days, take 2 tablets of bisacodyl (10 mg) 30 tablet 2    buprenorphine HCl-naloxone HCl (SUBOXONE) 2-0.5 MG per film Place 0.5 Film under the tongue daily       busPIRone (BUSPAR) 15 MG tablet Take 15 mg by mouth 3 times daily      CONSTULOSE 10 GM/15ML solution TAKE 15 MLS (10 G) BY MOUTH 3 TIMES DAILY AS NEEDED FOR CONSTIPATION (AS NEEDED FOR SEVERE CONSTIPAITON, NO BM FOR MORE THAN 3 DAYS AND FEELING CONSTIPATED) 300 mL 3    cyclobenzaprine (FLEXERIL) 5 MG tablet TAKE 1-2 TABLETS (5-10 MG) BY MOUTH 3 TIMES DAILY AS NEEDED FOR MUSCLE SPASMS 90 tablet 5    DULoxetine (CYMBALTA) 30 MG capsule Take 30 mg in evening, along with 60 mg in morning. 90 capsule 1    DULoxetine (CYMBALTA) 60 MG capsule Take 1 capsule (60 mg) by mouth every morning 90 capsule 1    famotidine (PEPCID) 40 MG tablet Take 1 tablet (40 mg) by mouth 2 times daily 60 tablet 1    linaclotide (LINZESS) 72 MCG capsule Take 1 capsule (72 mcg) by mouth every morning (before breakfast) 30 capsule 1    ondansetron (ZOFRAN ODT) 4 MG ODT tab DISSOLVE ONE TABLET ON TONGUE EVERY SIX HOURS AS NEEDED FOR NAUSEA 20 tablet 1    pantoprazole (PROTONIX) 40 MG EC tablet TAKE ONE TABLET BY MOUTH TWICE A DAY WITH MEALS 60 tablet 5    rOPINIRole (REQUIP) 1 MG tablet TAKE ONE TABLET BY MOUTH EVERY NIGHT AT BEDTIME 90 tablet 0    simvastatin (ZOCOR) 10 MG tablet TAKE ONE TABLET BY MOUTH EVERY NIGHT AT BEDTIME 90 tablet 0    zolpidem (AMBIEN) 5 MG tablet TAKE ONE TABLET (5 MG) BY MOUTH NIGHTLY AS NEEDED FOR SLEEP 30 tablet 1    cetirizine (ZYRTEC) 10 MG tablet TAKE ONE TABLET BY MOUTH EVERY MORNING (Patient not taking: Reported on  5/14/2024) 90 tablet 3    gabapentin (NEURONTIN) 300 MG capsule Take 1 capsule (300 mg) by mouth daily (Patient not taking: Reported on 5/14/2024)      nicotine (NICODERM CQ) 21 MG/24HR 24 hr patch Place 1 patch onto the skin every 24 hours (Patient not taking: Reported on 5/29/2024)      polyethylene glycol (MIRALAX) 17 GM/Dose powder Take 17 g by mouth daily (Patient not taking: Reported on 5/29/2024) 578 g 3       Medical History: any changes in medical history since they were last seen? No      Objective:    Physical Exam:  Blood pressure (!) 144/92, pulse 102, not currently breastfeeding.  Constitutional: Well developed, well nourished, appears stated age.  Gait: Intact   HEENT: Head atraumatic, normocephalic. Eyes without conjunctival injection or jaundice. Oropharynx clear. Neck supple. No obvious neck masses.  Skin: No rash, lesions, or petechiae of exposed skin.   Psychiatric/mental status: Alert, without lethargy or stupor. Speech fluent. Appropriate affect. Mood normal. Able to follow commands without difficulty.   MSK: Positive bilateral cervical facet loading with rotation and rotation/extension on exam.    Diagnostic Tests/Imaging/Labs:  BMP on 5/13/24 - GFR >90    BILLING TIME DOCUMENTATION:   The total TIME spent on this patient on the date of the encounter/appointment was 45 minutes.      TOTAL TIME includes:   Time spent preparing to see the patient (reviewing records and tests)   Time spent face to face (or over the phone) with the patient   Time spent ordering tests, medications, procedures and referrals   Time spent Referring and communicating with other healthcare professionals   Time spent documenting clinical information in Epic

## 2024-05-28 NOTE — PROGRESS NOTES
Answers submitted by the patient for this visit:  Patient Health Questionnaire (Submitted on 5/23/2024)  If you checked off any problems, how difficult have these problems made it for you to do your work, take care of things at home, or get along with other people?: Extremely difficult  PHQ9 TOTAL SCORE: 23  BO-7 (Submitted on 5/23/2024)  BO 7 TOTAL SCORE: 21          [facility]                                     Progress Note    Patient Name: Radha Beckwith  Date: 5/28/24         Service Type: Individual      Session Start Time: 4:00pm Session End Time: 4:50pm     Session Length: 50     Session #: 38    Attendees: Client    Service Modality:  Video Visit:      Provider verified identity through the following two step process.  Patient provided:  Patient is known previously to provider    Telemedicine Visit: The patient's condition can be safely assessed and treated via synchronous audio and visual telemedicine encounter.      Reason for Telemedicine Visit: Patient convenience (e.g. access to timely appointments / distance to available provider)    Originating Site (Patient Location): Patient's home    Distant Site (Provider Location): Provider Remote Setting- Home Office    Consent:  The patient/guardian has verbally consented to: the potential risks and benefits of telemedicine (video visit) versus in person care; bill my insurance or make self-payment for services provided; and responsibility for payment of non-covered services.     Patient would like the video invitation sent by:  My Chart    Mode of Communication:  Video Conference via [facility]    Distant Location (Provider):  On-site    As the provider I attest to compliance with applicable laws and regulations related to telemedicine.    DATA  Interactive Complexity: No  Crisis: No        Progress Since Last Session (Related to Symptoms / Goals / Homework):   Symptoms: No change .    Homework: Completed in session      Episode of Care  "Goals: Satisfactory progress - MAINTENANCE (Working to maintain change, with risk of relapse); Intervened by continuing to positively reinforce healthy behavior choice      Current / Ongoing Stressors and Concerns:   The client stated her boyfriend just got home today and is in a \"mood.\"   Still working on her paperwork SSDI.  Had another chest/pain episode the other day when she was trying to go for a short walk. Still waiting for the Linzess prescription and the endoscopy appointment.        Treatment Objective(s) Addressed in This Session:   Increase interest, engagement, and pleasure in doing things  Feel less tired and more energy during the day        Intervention:   Continued to process feelings about her relationship and coached her on how she can talk with her boyfriend about her fears and concerns. Reviewed coping skills she can utilize every day to help decrease activation around medical issues and her boyfriends moods.        Assessments completed prior to visit:  The following assessments were completed by patient for this visit:  PHQ9:       4/10/2024     1:59 PM 4/17/2024     1:20 PM 4/22/2024     3:31 PM 5/2/2024     1:10 PM 5/9/2024    12:29 PM 5/14/2024     2:05 PM 5/23/2024     5:46 AM   PHQ-9 SCORE   PHQ-9 Total Score MyChart 22 (Severe depression) 24 (Severe depression) 24 (Severe depression) 24 (Severe depression) 24 (Severe depression) 24 (Severe depression) 23 (Severe depression)   PHQ-9 Total Score 22 24 24 24 24 24 23     GAD7:       2/5/2024     6:27 PM 2/19/2024     3:39 PM 3/12/2024     8:32 PM 3/27/2024     8:51 PM 4/17/2024     1:23 PM 5/2/2024     1:11 PM 5/23/2024     5:47 AM   BO-7 SCORE   Total Score 21 (severe anxiety) 19 (severe anxiety) 20 (severe anxiety) 19 (severe anxiety) 21 (severe anxiety) 21 (severe anxiety) 21 (severe anxiety)   Total Score 21 19 20 19 21    21 21    21 21     PROMIS 10-Global Health (all questions and answers displayed):       11/26/2023    10:43 PM " 2/27/2024     6:40 PM 3/12/2024     8:35 PM 3/27/2024     8:53 PM 4/2/2024     4:38 PM 4/10/2024     1:58 PM 4/17/2024     1:28 PM   PROMIS 10   In general, would you say your health is: Fair Fair Poor Poor Poor Poor Poor   In general, would you say your quality of life is: Poor Fair Poor Poor Poor Poor Poor   In general, how would you rate your physical health? Poor Poor Poor Poor Poor Poor Poor   In general, how would you rate your mental health, including your mood and your ability to think? Poor Fair Poor Poor Poor Poor Poor   In general, how would you rate your satisfaction with your social activities and relationships? Poor Poor Poor Poor Poor Poor Poor   In general, please rate how well you carry out your usual social activities and roles Fair Poor Poor Poor Poor Fair Poor   To what extent are you able to carry out your everyday physical activities such as walking, climbing stairs, carrying groceries, or moving a chair? A little A little A little A little A little A little A little   In the past 7 days, how often have you been bothered by emotional problems such as feeling anxious, depressed, or irritable? Always Always Always Always Always Always Always   In the past 7 days, how would you rate your fatigue on average? Severe Severe Severe Severe Severe Severe Very severe   In the past 7 days, how would you rate your pain on average, where 0 means no pain, and 10 means worst imaginable pain? 7 7 8 7 7 7 7   In general, would you say your health is: 2 2 1 1 1 1 1   In general, would you say your quality of life is: 1 2 1 1 1 1 1   In general, how would you rate your physical health? 1 1 1 1 1 1 1   In general, how would you rate your mental health, including your mood and your ability to think? 1 2 1 1 1 1 1   In general, how would you rate your satisfaction with your social activities and relationships? 1 1 1 1 1 1 1   In general, please rate how well you carry out your usual social activities and roles.  (This includes activities at home, at work and in your community, and responsibilities as a parent, child, spouse, employee, friend, etc.) 2 1 1 1 1 2 1   To what extent are you able to carry out your everyday physical activities such as walking, climbing stairs, carrying groceries, or moving a chair? 2 2 2 2 2 2 2   In the past 7 days, how often have you been bothered by emotional problems such as feeling anxious, depressed, or irritable? 5 5 5 5 5 5 5   In the past 7 days, how would you rate your fatigue on average? 4 4 4 4 4 4 5   In the past 7 days, how would you rate your pain on average, where 0 means no pain, and 10 means worst imaginable pain? 7 7 8 7 7 7 7   Global Mental Health Score 4 6 4 4 4 4    4 4    4   Global Physical Health Score 7 7 7 7 7 7    7 6    6   PROMIS TOTAL - SUBSCORES 11 13 11 11 11 11    11 10    10     [place] Suicide Severity Rating Scale (Lifetime/Recent)      10/4/2022    11:00 AM 5/13/2024     3:13 PM   [place] Suicide Severity Rating (Lifetime/Recent)   Q1 Wished to be Dead (Past Month) no 0-->no   Q2 Suicidal Thoughts (Past Month) no 0-->no   Q3 Suicidal Thought Method no    Q4 Suicidal Intent without Specific Plan no    Q5 Suicide Intent with Specific Plan yes    Q6 Suicide Behavior (Lifetime) yes 0-->no   Within the Past 3 Months? no    Level of Risk per Screen high risk no risks indicated         ASSESSMENT: Current Emotional / Mental Status (status of significant symptoms):   Risk status (Self / Other harm or suicidal ideation)   Patient denies current fears or concerns for personal safety.   Patient denies current or recent suicidal ideation or behaviors.   Patient denies current or recent homicidal ideation or behaviors.   Patient denies current or recent self injurious behavior or ideation.   Patient denies other safety concerns.   Patient reports there has been no change in risk factors since their last session.     Patient reports there has been no change in  protective factors since their last session.     Recommended that patient call 911 or go to the local ED should there be a change in any of these risk factors.     Appearance:   Appropriate    Eye Contact:   Good    Psychomotor Behavior: Normal    Attitude:   Cooperative    Orientation:   All   Speech    Rate / Production: Normal/ Responsive Normal     Volume:  Normal    Mood:    Depressed  Normal   Affect:    Appropriate  Tearful   Thought Content:  Clear  Rumination    Thought Form:  Coherent    Insight:    Good      Medication Review:   No changes to current psychiatric medication(s)     Medication Compliance:   Yes     Changes in Health Issues:   None reported     Chemical Use Review:   Substance Use: Chemical use reviewed, no active concerns identified      Tobacco Use: No change in amount of tobacco use since last session.  Patient declined discussion at this time    Diagnosis:  1. Moderate episode of recurrent major depressive disorder (H)    2. Generalized anxiety disorder          Collateral Reports Completed:   Not Applicable    PLAN: (Patient Tasks / Therapist Tasks / Other)  Continue to continue to work on self care, talking to her partner if she can.         Ricarda Bhatia [facility]                                                         ______________________________________________________________________    Individual Treatment Plan    Patient's Name: Radha Beckwith  YOB: 1975    Date of Creation: 6/28/23  Date Treatment Plan Last Reviewed/Revised: 5/9/24    DSM5 Diagnoses: 296.32 (F33.1) Major Depressive Disorder, Recurrent Episode, Moderate _  Psychosocial / Contextual Factors: living with boyfriend, memory issues  PROMIS (reviewed every 90 days):     Referral / Collaboration:  Referral to another professional/service is not indicated at this time..    Anticipated number of session for this episode of care: 9-12 sessions  Anticipation frequency of session:  as needed  Anticipated  Duration of each session: 38-52 minutes  Treatment plan will be reviewed in 90 days or when goals have been changed.       MeasurableTreatment Goal(s) related to diagnosis / functional impairment(s)  Goal 1: Patient will increase communication skills with family members.    Objective #A (Patient Action)    Patient will learn & utilize at least 2 assertive communication skills weekly.  Status: Continued - Date(s): 5/9/24    Intervention(s)  Therapist will teach emotional regulation skills. distress tolerance skills, interpersonal effectiveness skills, emotion regluation skills, mindfulness skills, radical acceptance. Therapist will teach client how to ID body cues for anxiety, anxiety reduction techniques, how to ID triggers for depression and anxiety- decrease reactivity/eliminate, lifestyle changes to reduce depression and anxiety, communication skills, explore cognitive beliefs and help client to develop healthy cognitive patterns and beliefs.    Objective #B  Patient will use thought-stopping strategy daily to reduce intrusive thoughts.  Status: Continued - Date(s): 5/9/24    Intervention(s)  Therapist will teach emotional regulation skills. distress tolerance skills, interpersonal effectiveness skills, emotion regluation skills, mindfulness skills, radical acceptance. Therapist will teach client how to ID body cues for anxiety, anxiety reduction techniques, how to ID triggers for depression and anxiety- decrease reactivity/eliminate, lifestyle changes to reduce depression and anxiety, communication skills, explore cognitive beliefs and help client to develop healthy cognitive patterns and beliefs.      Goal 2: Patient will decrease depression symptoms.      Objective #A (Patient Action)    Status: Continued - Date(s): 5/9/24    Patient will Increase interest, engagement, and pleasure in doing things  Decrease frequency and intensity of feeling down, depressed, hopeless  Improve quantity and quality of night time  sleep / decrease daytime naps  Feel less tired and more energy during the day   Improve diet, appetite, mindful eating, and / or meal planning  Identify negative self-talk and behaviors: challenge core beliefs, myths, and actions  Improve concentration, focus, and mindfulness in daily activities   Feel less fidgety, restless or slow in daily activities / interpersonal interactions.    Intervention(s)  Therapist will teach emotional regulation skills. distress tolerance skills, interpersonal effectiveness skills, emotion regluation skills, mindfulness skills, radical acceptance. Therapist will teach client how to ID body cues for anxiety, anxiety reduction techniques, how to ID triggers for depression and anxiety- decrease reactivity/eliminate, lifestyle changes to reduce depression and anxiety, communication skills, explore cognitive beliefs and help client to develop healthy cognitive patterns and beliefs.    Objective #B  Patient will identify three distraction and diversion activities and use those activities to decrease level of anxiety  .    Status: Continued - Date(s):  5/9/24    Intervention(s)  Therapist will teach emotional regulation skills. distress tolerance skills, interpersonal effectiveness skills, emotion regluation skills, mindfulness skills, radical acceptance. Therapist will teach client how to ID body cues for anxiety, anxiety reduction techniques, how to ID triggers for depression and anxiety- decrease reactivity/eliminate, lifestyle changes to reduce depression and anxiety, communication skills, explore cognitive beliefs and help client to develop healthy cognitive patterns and beliefs.      Patient has reviewed and agreed to the above plan.      Ricarda Bhatia [facility]

## 2024-05-29 ENCOUNTER — OFFICE VISIT (OUTPATIENT)
Dept: PALLIATIVE MEDICINE | Facility: CLINIC | Age: 49
End: 2024-05-29
Payer: COMMERCIAL

## 2024-05-29 VITALS — SYSTOLIC BLOOD PRESSURE: 144 MMHG | DIASTOLIC BLOOD PRESSURE: 92 MMHG | HEART RATE: 102 BPM

## 2024-05-29 DIAGNOSIS — M54.42 CHRONIC BILATERAL LOW BACK PAIN WITH BILATERAL SCIATICA: ICD-10-CM

## 2024-05-29 DIAGNOSIS — G89.29 CHRONIC NECK PAIN: ICD-10-CM

## 2024-05-29 DIAGNOSIS — M79.642 BILATERAL HAND PAIN: ICD-10-CM

## 2024-05-29 DIAGNOSIS — G89.29 CHRONIC BILATERAL THORACIC BACK PAIN: ICD-10-CM

## 2024-05-29 DIAGNOSIS — M79.641 BILATERAL HAND PAIN: ICD-10-CM

## 2024-05-29 DIAGNOSIS — M79.18 MYOFASCIAL PAIN: ICD-10-CM

## 2024-05-29 DIAGNOSIS — M47.812 ARTHROPATHY OF CERVICAL FACET JOINT: Primary | ICD-10-CM

## 2024-05-29 DIAGNOSIS — M54.6 CHRONIC BILATERAL THORACIC BACK PAIN: ICD-10-CM

## 2024-05-29 DIAGNOSIS — G89.29 CHRONIC BILATERAL LOW BACK PAIN WITH BILATERAL SCIATICA: ICD-10-CM

## 2024-05-29 DIAGNOSIS — M54.2 CHRONIC NECK PAIN: ICD-10-CM

## 2024-05-29 DIAGNOSIS — M54.41 CHRONIC BILATERAL LOW BACK PAIN WITH BILATERAL SCIATICA: ICD-10-CM

## 2024-05-29 PROCEDURE — 99215 OFFICE O/P EST HI 40 MIN: CPT

## 2024-05-29 RX ORDER — BACLOFEN 20 MG/1
TABLET ORAL
Qty: 90 TABLET | Refills: 1 | Status: SHIPPED | OUTPATIENT
Start: 2024-05-29 | End: 2024-08-20

## 2024-05-29 ASSESSMENT — PAIN SCALES - GENERAL: PAINLEVEL: SEVERE PAIN (6)

## 2024-05-29 NOTE — PATIENT INSTRUCTIONS
Pain Physical Therapy:  Currently engaging in PT for low back/SI and neck pain. Continue therapy and activity as tolerated at home.      Pain Psychologist to address relaxation, behavioral change, coping style, and other factors important to improvement.  YES - referral to Karen Kohler PhD for mindfulness and pain coping strategies.      Diagnostic Studies:  None      Medication Management:   Duloxetine -  Current dose 60 mg in morning and 30 mg at bedtime. Appreciates benefit, no new/significant concerns for side effects.   Gabapentin - 300 mg in morning. Discussed taper down/off at last visit, determined she appreciates benefit with morning dose, no side effects. Continue with current dose.   Muscle relaxants:   Continue baclofen 20 mg twice daily.  Appreciates some degree of benefit. No side effects. Recommend adding third dose, start 10 mg in afternoon x 5 days, then increase to 20 mg three times daily. Monitor for enhanced benefit, cautioned about potential sedation effects.   Flexeril - 5-10 mg twice daily as needed. PCP managing. Recommend reducing use to once daily as needed if helpful on days with increased pain.   Allow 4-6 hours in between all muscle relaxant doses, do not mix with alcohol.   Medical cannabis - certified for [place] medical cannabis program at last visit on 1/3/24. No clear benefit for pain with daytime products, some benefit at night with products used at bedtime.       Potential procedures:   Cervical JASE scheduled with Dr. Ivory on 4/19/24, reports benefit for 1-2 days after injection, then pain returned to baseline.   bilateral cervical 3,4,5 medial branch nerve block #1 ordered today, with plans to proceed to radiofrequency ablation. Positive bilateral cervical facet loading with rotation and rotation/extension on exam.   Referral to Sports Medicine for wrist/hand injection evaluation. Scheduling phone number is [phone].      Other Orders/Referrals:   Recommend using TENS unit  a few times throughout the day as needed, particularly when engaging in activities that may increase pain.      Follow up with JEANNIE Schroeder CNP in around 6-8 weeks, or sooner if needed.     ----------------------------------------------------------------  Clinic Number:  [phone]   Call with any questions about your care and for scheduling assistance.   Calls are returned Monday through Friday between 8 AM and 4:30 PM. We usually get back to you within 2 business days depending on the issue/request.    If we are prescribing your medications:  For opioid medication refills, call the clinic or send a [place] message 7 days in advance.  Please include:  Name of requested medication  Name of the pharmacy.  For non-opioid medications, call your pharmacy directly to request a refill. Please allow 3-4 days to be processed.   Per [place] State Law:  All controlled substance prescriptions must be filled within 30 days of being written.    For those controlled substances allowing refills, pickup must occur within 30 days of last fill.      We believe regular attendance is key to your success in our program!    Any time you are unable to keep your appointment we ask that you call us at least 24 hours in advance to cancel.This will allow us to offer the appointment time to another patient.   Multiple missed appointments may lead to dismissal from the clinic.

## 2024-05-30 ENCOUNTER — TELEPHONE (OUTPATIENT)
Dept: GASTROENTEROLOGY | Facility: CLINIC | Age: 49
End: 2024-05-30
Payer: COMMERCIAL

## 2024-05-30 ENCOUNTER — TELEPHONE (OUTPATIENT)
Dept: PALLIATIVE MEDICINE | Facility: CLINIC | Age: 49
End: 2024-05-30
Payer: COMMERCIAL

## 2024-05-30 DIAGNOSIS — M47.812 SPONDYLOSIS OF CERVICAL REGION WITHOUT MYELOPATHY OR RADICULOPATHY: Primary | ICD-10-CM

## 2024-05-30 NOTE — TELEPHONE ENCOUNTER
Central Prior Authorization Team   Phone: [phone]    PA Initiation    Medication: Linzess  Insurance Company: [employer] - Phone [phone] Fax [phone]  Pharmacy Filling the Rx: [address]   Filling Pharmacy Phone: [phone]  Filling Pharmacy Fax:    Start Date: 5/30/2024

## 2024-05-30 NOTE — TELEPHONE ENCOUNTER
[facility] Call Center    Phone Message    May a detailed message be left on voicemail: yes     Reason for Call: Other: Linette is calling in asking for a call back regarding her prescription for linaclotide (LINZESS) 72 MCG capsule. Please call back as soon as possible to discuss.     Action Taken: Message routed to:  [facility] GI    Travel Screening: Not Applicable     Date of Service:

## 2024-05-30 NOTE — TELEPHONE ENCOUNTER
No PA required, okay to schedule      Georgiana GANN  Complex   [facility]

## 2024-05-30 NOTE — TELEPHONE ENCOUNTER
"Endoscopy Scheduling Screen    Have you had a positive Covid test in the last 14 days?  No    What is your communication preference for Instructions and/or Bowel Prep?   MyChart    What insurance is in the chart?  Other:  BP    Ordering/Referring Provider:   KAJAL VILLAGOMEZ    (If ordering provider performs procedure, schedule with ordering provider unless otherwise instructed. )    BMI: Estimated body mass index is 30.21 kg/m  as calculated from the following:    Height as of 4/11/24: 1.626 m (5' 4\").    Weight as of 4/19/24: 79.8 kg (176 lb).     Sedation Ordered  MAC/deep sedation.   BMI<= 45 45 < BMI <= 48 48 < BMI < = 50  BMI > 50   No Restrictions No [facility]  No [facility]  [facility] with exceptions Hospital Only [place] Only       Do you have a history of malignant hyperthermia?  No    (Females) Are you currently pregnant?   No     Have you been diagnosed or told you have pulmonary hypertension?   No    Do you have an LVAD?  No    Have you been told you have moderate to severe sleep apnea?  No    Have you been told you have COPD, asthma, or any other lung disease?  No    Do you have any heart conditions?  No     Have you ever had or are you waiting for an organ transplant?  No. Continue scheduling, no site restrictions.    Have you had a stroke or transient ischemic attack (TIA aka \"mini stroke\" in the last 6 months?   No    Have you been diagnosed with or been told you have cirrhosis of the liver?   No    Are you currently on dialysis?   No    Do you need assistance transferring?   No    BMI: Estimated body mass index is 30.21 kg/m  as calculated from the following:    Height as of 4/11/24: 1.626 m (5' 4\").    Weight as of 4/19/24: 79.8 kg (176 lb).     Is patients BMI > 40 and scheduling location [facility]?  No    Do you take an injectable medication for weight loss or diabetes (excluding insulin)?  No    Do you take the medication Naltrexone?  No    Do you take blood thinners?  No       Prep   Are you currently on " dialysis or do you have chronic kidney disease?  No    Do you have a diagnosis of diabetes?      Do you have a diagnosis of cystic fibrosis (CF)?      On a regular basis do you go 3 -5 days between bowel movements?      BMI > 40?      Preferred Pharmacy:    [facility] - [address]   [address] [place] [address]  Phone: [phone] Fax: [phone]    [facility] [place], [place] - [address]   [address]   [facility] [address]  Phone: [phone] Fax: [phone]    [facility] - [place], [place] - [address] [facility]  [address] [place] [id number]  Phone: [phone] Fax: [phone]      Final Scheduling Details     Procedure scheduled  Upper endoscopy with BRAVO capsule placement    Surgeon:  Genevieve     Date of procedure:  8/21     Pre-OP / PAC:   No - Not required for this site.    Location  [facility] - [facility] - Patient preference.    Sedation   MAC/Deep Sedation - Per order.      Patient Reminders:   You will receive a call from a Nurse to review instructions and health history.  This assessment must be completed prior to your procedure.  Failure to complete the Nurse assessment may result in the procedure being cancelled.      On the day of your procedure, please designate an adult(s) who can drive you home stay with you for the next 24 hours. The medicines used in the exam will make you sleepy. You will not be able to drive.      You cannot take public transportation, ride share services, or non-medical taxi service without a responsible caregiver.  Medical transport services are allowed with the requirement that a responsible caregiver will receive you at your destination.  We require that drivers and caregivers are confirmed prior to your procedure.

## 2024-05-30 NOTE — TELEPHONE ENCOUNTER
Screening questions for MBB Injections:    Injection to be done at which interventional clinic site? [facility] [place]    Procedure ordered by Dr. Landrum    Procedure ordered? Cervical Medial Branch Block    What insurance would patient like us to bill for this procedure? BC    MEDICA: REQUIRES A PA FOR BOTH MBB   Worker's comp- Any injection DO NOT SCHEDULE and route to Ralph Poon.    [employer] insurance - If scheduling an SI joint injection DO NOT SCHEDULE and route to Georgiana Crespo.     MBBs must be scheduled with elapsed time interval of at least 2 weeks and not more than 6 months between the First MBB and the Second MBB for insurance purposes     [employer] - Any injection besides hip/shoulder/knee joint DO NOT SCHEDULE and route to Georgiana Crespo. She will obtain PA and call pt back to schedule procedure or notify pt of denial.     HP [employer]- PA required for all MBB's    **BCBS- ALL need to be routed to Georgiana for review if a PA is needed**  IF SCHEDULING IN [place] [facility] PLEASE SCHEDULE AT LEAST 7-10         BUSINESS DAYS OUT SO A PA CAN BE OBTAINED    Genicular Nerve blocks- ALL insurances except for- [employer], Medicare (straight not supplement) and [employer]. Need to be reviewed by Georgiana before scheduling.       Is patient scheduled at [facility]? no   If YES, route every encounter to [facility] CARE NAVIGATION POOL [[phone]]    Any chance of pregnancy? NO   If YES, do NOT schedule and route to RN pool  - Dr. Wang route to Peyton Estes and PM&R Nurse  [[id number]]      Is an  needed? No     Patient has a drive home? (mandatory) Yes     Is patient taking any blood thinners (plavix, coumadin, jantoven, warfarin, heparin, pradaxa or dabigatran )? No    If hold needed, do NOT schedule, route to RN pool/ Dr. Wang's Team     Is patient taking any aspirin products? No   If yes route to RN pool/ Dr. Wang's Team - Do not schedule     Does  the patient have a bleeding or clotting disorder? No  If YES, okay to schedule AND route to RN nurse pool/ Dr. Wang's Team  **For any patients with platelet count <100, must be forwarded to provider**    Is patient diabetic? no If YES, have them bring their glucometer.    Does patient have an active infection or treated for one within the past week? No    Is patient currently taking any antibiotics?  No  For patients on chronic, preventative, or prophylactic antibiotics, procedures may be scheduled.   For patients on antibiotics for active or recent infection:antibiotic course must have been completed for 4 days    Is patient currently taking any steroid medications? (i.e. Prednisone, Medrol)  No   For patients on steroid medications, course must have been completed for 4 days    Is patient actively being treated for cancer or immunocompromised? No   If YES, do NOT schedule and route to CONG/ Dr. Wang's Team    Are you able to get on and off an exam table with minimal or no assistance? Yes   If NO, do NOT schedule and route to RN/ Dr. Zapiens Team    Are you able to roll over and lay on your stomach with minimal or no assistance? Yes   If NO, do NOT schedule and route to RN/ Dr. Zapiens Team    Any allergies to contrast dye, iodine, shellfish, or numbing and steroid medications? No  (If so, inform nursing and note in scheduling comments.)    Allergies: Ergotamine-caffeine, Seasonal allergies, Sumatriptan, Compazine, Droperidol, Nubain [nalbuphine hcl], and Prochlorperazine     Does patient have an MRI/CT?  Not Applicable  Check Procedure Scheduling Grid to see if required.    Was the MRI done within the last 3 years?  NA  If yes, [facility], [facility], [facility], [facility] etc?     If no, do not schedule and route to nursing/ Dr. Wang's Team  If MRI was not done at [place], [facility] or [facility] do NOT schedule and route to nursing.    If pt has an imaging disc, the  injection MAY be scheduled but pt has to bring disc to appt.   If they show up without the disc the injection cannot be done    Is patient able to transfer to a procedure table with minimal or no assistance? Yes  If NO, do NOT schedule and route to RN/ Dr. Wang's Team    Medial Branch Block Pre-Procedure Instructions  It is okay to take long acting pain medications (if you are on them) the day of the procedure but try not to take any short acting medications unless absolutely necessary.    YES: ok   Long acting meds would include: Gabapentin (Neurontin), MS Contin, Oxycontin        Short acting meds would include:  Percocet, Oxycodone, Vicodin, Ibuprofen   The day of the procedure, you should try to do things that provoke your pain, since the injection is being done to see if it will relieve your pain . YES: ok   If your pain level is a 4 out of 10 or less on the day of the procedu re, please call [phone] to reschedule.  YES: ok     Reminders:    If you are started on any steroids or antibiotics between now and your appointment, you must contact us because it may affect our ability to perform your procedure ok    Instructed pt to arrive 30 minutes early for IV start if required. (Check Procedure Scheduling Grid) SAMMY     If this is for a cervical MBB aspirin needs to be held for 6 days.  NO     Do not schedule procedures requiring IV placement in the first appointment of the day or first appointment after lunch. Do NOT schedule at 0745, 0815 or 1245.  ok    For patients 85 or older we recommend having an adult stay w/ them for the remainder of the day.      Does the patient have any questions? no
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
## 2024-05-31 NOTE — TELEPHONE ENCOUNTER
Spoke with patient, PA has been submitted, still waiting to hear.  Patient expressed understanding.      Juana Junior RN

## 2024-06-03 ENCOUNTER — THERAPY VISIT (OUTPATIENT)
Dept: PHYSICAL THERAPY | Facility: CLINIC | Age: 49
End: 2024-06-03
Attending: PREVENTIVE MEDICINE
Payer: COMMERCIAL

## 2024-06-03 DIAGNOSIS — M54.10 RADICULAR PAIN OF UPPER EXTREMITY: ICD-10-CM

## 2024-06-03 DIAGNOSIS — M54.2 CERVICALGIA: Primary | ICD-10-CM

## 2024-06-03 PROCEDURE — 97110 THERAPEUTIC EXERCISES: CPT | Mod: GP | Performed by: PHYSICAL THERAPIST

## 2024-06-03 PROCEDURE — 97140 MANUAL THERAPY 1/> REGIONS: CPT | Mod: GP | Performed by: PHYSICAL THERAPIST

## 2024-06-03 PROCEDURE — 97535 SELF CARE MNGMENT TRAINING: CPT | Mod: GP | Performed by: PHYSICAL THERAPIST

## 2024-06-05 ENCOUNTER — VIRTUAL VISIT (OUTPATIENT)
Dept: PALLIATIVE MEDICINE | Facility: CLINIC | Age: 49
End: 2024-06-05
Payer: COMMERCIAL

## 2024-06-05 DIAGNOSIS — G89.29 CHRONIC BILATERAL THORACIC BACK PAIN: ICD-10-CM

## 2024-06-05 DIAGNOSIS — F41.9 ANXIETY: ICD-10-CM

## 2024-06-05 DIAGNOSIS — M47.812 ARTHROPATHY OF CERVICAL FACET JOINT: Primary | ICD-10-CM

## 2024-06-05 DIAGNOSIS — G89.29 CHRONIC BILATERAL LOW BACK PAIN WITH BILATERAL SCIATICA: ICD-10-CM

## 2024-06-05 DIAGNOSIS — M54.41 CHRONIC BILATERAL LOW BACK PAIN WITH BILATERAL SCIATICA: ICD-10-CM

## 2024-06-05 DIAGNOSIS — M54.42 CHRONIC BILATERAL LOW BACK PAIN WITH BILATERAL SCIATICA: ICD-10-CM

## 2024-06-05 DIAGNOSIS — G89.29 CHRONIC NECK PAIN: ICD-10-CM

## 2024-06-05 DIAGNOSIS — M79.642 BILATERAL HAND PAIN: ICD-10-CM

## 2024-06-05 DIAGNOSIS — M54.6 CHRONIC BILATERAL THORACIC BACK PAIN: ICD-10-CM

## 2024-06-05 DIAGNOSIS — M79.641 BILATERAL HAND PAIN: ICD-10-CM

## 2024-06-05 DIAGNOSIS — R11.0 NAUSEA: ICD-10-CM

## 2024-06-05 DIAGNOSIS — M79.18 MYOFASCIAL PAIN: ICD-10-CM

## 2024-06-05 DIAGNOSIS — M54.2 CHRONIC NECK PAIN: ICD-10-CM

## 2024-06-05 PROCEDURE — 96156 HLTH BHV ASSMT/REASSESSMENT: CPT | Mod: 95 | Performed by: PSYCHOLOGIST

## 2024-06-05 NOTE — PROGRESS NOTES
Radha Beckwith  :  1975  DOS: 2024  MRN: [id number]  PCP: Gisselle Linn    Sports Medicine Clinic Visit      HPI  Radha Beckwith is a 48 year old female who is seen in consultation at the request of  Jenny Landrum C.N.P. presenting with bilateral hand pain.    - Mechanism of Injury:    - No inciting injury  - Pertinent history and prior evaluations:    - Hx of cervical radiculopathy and chronic low back pain.  S/p C6-C7 IL JASE, most recent in  and 2024.  Suspected of carpal tunnel syndrome and de Quervain's tenosynovitis bilaterally in the past.  - 2023 BUE EMG within normal limits without electrodiagnostic evidence of median mononeuropathies at the wrists.  Also needled the right side, which was normal.  - Seen in  and  for bilateral hand weakness. Did occupational therapy. Was working with Dr. Pat Patterson.  Then saw Dr. Gonsalves on 2023 and 2023, has been using multiple wrist splints and Voltaren gel.  No previous injections.  Suspected probable de Quervain's tenosynovitis bilaterally and cervical radiculopathy contributing to her pain, not classic for CTS.  Recommended consideration of cervical JASE and repeat EMG if needed.    - Pain Character:    - Location:  bilateral base of thumbs into radial wrist and bilateral long fingers  - Character:  burning, pins and needles, electric shock  - Duration:  chronic  - Course:  worsening  - Endorses:    - numbness, tingling occasionally in the superficial radial nerve distribution, hand weakness, right wrist clicking, right long finger triggering, swelling in fingers  - Denies:    - instability, radicular shooting pain  - Alleviating factors:    -  thumb spica wrist braces , topicals  - Aggravating factors:    - gripping, weather changes  - Other treatments tried:    - thumb spica wrist braces (helpful but limiting what she can do with braces on), Voltaren, unable to take IBU, heat, cold, Lidocaine patches,  topical     - Patient Goals:    - Referred for hand/wrist injections  - Social History:   -  Currently not working: working on getting on disability      Review of Systems  Musculoskeletal: as above  Remainder of review of systems is negative including constitutional, CV, pulmonary, GI, Skin and Neurologic except as noted in HPI or medical history.    Past Medical History:   Diagnosis Date    Abdominal pain, right lower quadrant 03/09/2008    Admit. Discharged 03/10/08    Anxiety attack 07/31/2015    Atypical chest pain 06/23/2015    De Quervain's disease (tenosynovitis)     Dehydration     Depressive disorder 1996    Gastric ulcer 07/31/2015    GERD (gastroesophageal reflux disease) 07/28/2010    Takes omeprazole and metoclopramide     Hypertension 2017    Ingrowing nail 01/09/2014    Migraines     Opioid dependence in remission (H) 08/02/2015    Other and unspecified ovarian cyst     Papanicolaou smear of cervix with low grade squamous intraepithelial lesion (LGSIL) 07/07/2017     Past Surgical History:   Procedure Laterality Date    BIOPSY CERVICAL, LOCAL EXCISION, SINGLE/MULTIPLE N/A 8/10/2017    Procedure: BIOPSY CERVICAL, LOCAL EXCISION, SINGLE/MULTIPLE;;  Surgeon: Michael Chandler MD;  [facility]    COLONOSCOPY N/A 1/6/2023    Procedure: COLONOSCOPY;  Surgeon: Michael Dozier MD;  [facility]    COLPOSCOPY, BIOPSY, COMBINED N/A 8/10/2017    Procedure: COMBINED COLPOSCOPY, BIOPSY;  Colposcopy with Cervical Biopsies and Endometrial Biopsy, Exam with Ultrasound;  Surgeon: Michael Chandler MD;  [facility]    ESOPHAGOSCOPY, GASTROSCOPY, DUODENOSCOPY (EGD), COMBINED N/A 4/17/2017    Procedure: COMBINED ESOPHAGOSCOPY, GASTROSCOPY, DUODENOSCOPY (EGD);  Surgeon: Ibrahima Esposito MD;  [facility]    ESOPHAGOSCOPY, GASTROSCOPY, DUODENOSCOPY (EGD), COMBINED N/A 1/6/2023    Procedure: ESOPHAGOGASTRODUODENOSCOPY, WITH BIOPSY;  Surgeon: Michael Dozier MD;  [facility]     ESOPHAGOSCOPY, GASTROSCOPY, DUODENOSCOPY (EGD), COMBINED N/A 10/3/2023    Procedure: ESOPHAGOGASTRODUODENOSCOPY, WITH BIOPSY;  Surgeon: John Paul Varela MD;  [facility]    EXAM UNDER ANESTHESIA PELVIC N/A 8/10/2017    Procedure: EXAM UNDER ANESTHESIA PELVIC;;  Surgeon: Michael Chandler MD;  [facility]    HYSTERECTOMY      HYSTERECTOMY, PAP NO LONGER INDICATED      INJECT EPIDURAL CERVICAL N/A 10/20/2023    Procedure: Cervical 6-7 Interlaminar epidural steroid injection using fluoroscopic guidance with contrast dye.;  Surgeon: Trevor Ivory MD;  [facility]    INJECT EPIDURAL CERVICAL N/A 2024    Procedure: Cervical 6 - Cervical 7 Interlaminar epidural steroid injection using fluoroscopic guidance with contrast dye.;  Surgeon: Trevor Ivory MD;  [facility]    INJECT EPIDURAL LUMBAR Bilateral 2022    Procedure: Lumbar 5-Sacral 1 Transforaminal Epidural Steroid Injection with fluoroscopic guidance and contrast, bilateral;  Surgeon: Trevor Ivory MD;  [facility]    LAPAROSCOPIC CHOLECYSTECTOMY N/A 2018    Procedure: LAPAROSCOPIC CHOLECYSTECTOMY;  Laparoscopic Cholecystectomy;  Surgeon: Tigre Lowry DO;  [facility]    LAPAROSCOPIC HYSTERECTOMY TOTAL N/A 10/30/2017    Procedure: LAPAROSCOPIC HYSTERECTOMY TOTAL;  LAPAROSCOPIC HYSTERECTOMY TOTAL POSSIBLE SALPINGO-OOPHERECTOMY (BILATERAL);  Surgeon: Michael Chandler MD;  [facility]    ZZHC UGI ENDOSCOPY, SIMPLE EXAM  08     Family History   Problem Relation Age of Onset    Depression Mother     Respiratory Mother     Chronic Obstructive Pulmonary Disease Mother     Hypertension Mother     Anxiety Disorder Mother     Cerebrovascular Disease Father         First stroke at age 28,  from 2nd when he was 55. Brain aneurysms.    Breast Cancer Cousin     Adrenal Disorder Other     Chronic Obstructive Pulmonary Disease Other     Asthma Brother          Objective  Wt 79.8 kg (176 lb)   LMP   (LMP Unknown)   BMI 30.21 kg/m      General: healthy, alert and in no acute distress.    HEENT: no scleral icterus or conjunctival erythema.   Skin: no suspicious lesions or rash. No jaundice.   CV: regular rhythm by palpation, 2+ distal pulses.  Resp: normal respiratory effort without conversational dyspnea.   Psych: normal mood and affect.    Gait: nonantalgic, appropriate coordination and balance.     Neuro:        - Sensation to light touch:    - Intact throughout the BUE including all peripheral nerve distributions.        - Special tests:   - Tinel's at the wrist:  Neg    - Tinel's at the elbow:  Neg    - Stressed Phalen's:  Neg     MSK - Wrist/Hand:        - Inspection:    -Moderate swelling present over the radial wrist and in the third digit.  No surrounding erythema, warmth, ecchymosis, lesion, or atrophy noted.        - ROM:    - Full AROM/PROM with pain during thumb abduction, extension       - Palpation:    - TTP at the de Quervain's tendon sheath bilaterally, right worse than left.   - NTTP elsewhere.        - Strength:  (*antalgic)   - Forearm Pronation   5   - Forearm Supination   5   - Wrist Extension   5   - Wrist Flexion    5   - FDI     5   - ADM     5   - FPL     5   - APB     5   - EIP     5   - EDC     5   - APL/EPB    5-*            - Special tests:        - CMC grind:  Neg    - Finkelstein's:  ++Pos, R > L   - TFCC compression:  Neg    - Fulton's:  Neg       Radiology  I independently reviewed the available relevant imaging in the chart with my interpretations as above in HPI.     I independently reviewed today's new relevant imaging, with the following interpretation:  - XR B/L hands 6/6/2024 shows normal anatomic alignment without significant degenerative changes, no acute fractures or dislocations.      Assessment  1. De Quervain's tenosynovitis, bilateral    2. Bilateral hand pain        Plan  Radha Beckwith is a pleasant 48 year old female that presents with chronic bilateral  hand pain, right worse than left.  She has multiple areas of pain, worst is in the radial wrist and thumb that hurts the most with repetitive hand motions, hand , thumb abduction/extension.  Very positive Finkelstein's maneuver today on exam.  She does have some pain elsewhere in the wrists and some numbness/tingling in the hand.  The numbness and tingling appears most consistent with superficial radial neuropathy, may have some CTS paresthesias as well.  Radiographs within normal limits without significant degenerative changes, fractures, or dislocations.  She has been trying thumb spica bracing in the past which she wears only at night, and has not helped fully, no injections in the past.  No significant radicular shooting pain throughout the arm.  History and physical exam appear most consistent with de Quervain's tenosynovitis bilaterally, right worse than left.  She does not seem to have a major component of cervical radiculopathy affecting her symptoms.    We discussed the nature of the condition and available treatment options, and mutually agreed upon the following plan:    She would like to start by trying a Medrol Dosepak to see how much pain relief she gets from the burst pack, she will keep a pain log of what pain improves and what pain does.  Instructed on proper use of the thumb spica brace which will be to use it during exacerbating activities, do not necessarily need to wear it at night or at rest unless she finds it helpful.  She will try this new regimen for thumb spica bracing.  Also talked about more low-profile braces such as a gamekeeper brace, which she may take from clinic or purchase on her own.  Try to gamekeeper brace on in clinic and decided not to take 1 today.  She will use Voltaren gel and other topicals with Tylenol as needed for breakthrough pain.    We will follow-up in 2 to 3 weeks to evaluate how much improvement she got from the Medrol Dosepak and bracing modifications, may  consider de Quervain's tendon sheath corticosteroid injection at that time if desired.  May contact clinic for questions/concerns as needed.      Jose Adame DO, JORGE  [facility]  [facility] - Department of Orthopedic Surgery       Disclaimer:  This note was prepared and written using Dragon Medical dictation software. As a result, there may be errors in the script that have gone undetected. Please consider this when interpreting the information in this note.

## 2024-06-06 ENCOUNTER — ANCILLARY PROCEDURE (OUTPATIENT)
Dept: GENERAL RADIOLOGY | Facility: CLINIC | Age: 49
End: 2024-06-06
Attending: STUDENT IN AN ORGANIZED HEALTH CARE EDUCATION/TRAINING PROGRAM
Payer: COMMERCIAL

## 2024-06-06 ENCOUNTER — OFFICE VISIT (OUTPATIENT)
Dept: ORTHOPEDICS | Facility: CLINIC | Age: 49
End: 2024-06-06
Payer: COMMERCIAL

## 2024-06-06 ENCOUNTER — VIRTUAL VISIT (OUTPATIENT)
Dept: PSYCHOLOGY | Facility: CLINIC | Age: 49
End: 2024-06-06
Payer: COMMERCIAL

## 2024-06-06 VITALS — BODY MASS INDEX: 30.21 KG/M2 | WEIGHT: 176 LBS

## 2024-06-06 DIAGNOSIS — M65.4 DE QUERVAIN'S TENOSYNOVITIS, BILATERAL: Primary | ICD-10-CM

## 2024-06-06 DIAGNOSIS — M79.641 BILATERAL HAND PAIN: ICD-10-CM

## 2024-06-06 DIAGNOSIS — M79.642 BILATERAL HAND PAIN: ICD-10-CM

## 2024-06-06 DIAGNOSIS — F33.1 MODERATE EPISODE OF RECURRENT MAJOR DEPRESSIVE DISORDER (H): Primary | ICD-10-CM

## 2024-06-06 DIAGNOSIS — F41.1 GENERALIZED ANXIETY DISORDER: ICD-10-CM

## 2024-06-06 PROCEDURE — 90837 PSYTX W PT 60 MINUTES: CPT | Mod: 95 | Performed by: COUNSELOR

## 2024-06-06 PROCEDURE — 73130 X-RAY EXAM OF HAND: CPT | Mod: TC | Performed by: RADIOLOGY

## 2024-06-06 PROCEDURE — 99204 OFFICE O/P NEW MOD 45 MIN: CPT | Performed by: STUDENT IN AN ORGANIZED HEALTH CARE EDUCATION/TRAINING PROGRAM

## 2024-06-06 RX ORDER — ONDANSETRON 4 MG/1
TABLET, ORALLY DISINTEGRATING ORAL
Qty: 20 TABLET | Refills: 1 | Status: SHIPPED | OUTPATIENT
Start: 2024-06-06 | End: 2024-08-20

## 2024-06-06 RX ORDER — METHYLPREDNISOLONE 4 MG
TABLET, DOSE PACK ORAL
Qty: 21 TABLET | Refills: 0 | Status: SHIPPED | OUTPATIENT
Start: 2024-06-06 | End: 2024-07-23

## 2024-06-06 ASSESSMENT — PAIN SCALES - GENERAL: PAINLEVEL: MODERATE PAIN (5)

## 2024-06-06 NOTE — LETTER
2024      Radha Beckwith  [address]  [place] [address]      Dear Colleague,    Thank you for referring your patient, Radha Beckwith, to the [facility] [place]. Please see a copy of my visit note below.    Radha Beckwith  :  1975  DOS: 2024  MRN: [id number]  PCP: Gisselle Linn    Sports Medicine Clinic Visit      HPI  Radha Beckwith is a 48 year old female who is seen in consultation at the request of  Jenny Landrum C.N.P. presenting with bilateral hand pain.    - Mechanism of Injury:    - No inciting injury  - Pertinent history and prior evaluations:    - Hx of cervical radiculopathy and chronic low back pain.  S/p C6-C7 IL JASE, most recent in  and 2024.  Suspected of carpal tunnel syndrome and de Quervain's tenosynovitis bilaterally in the past.  - 2023 BUE EMG within normal limits without electrodiagnostic evidence of median mononeuropathies at the wrists.  Also needled the right side, which was normal.  - Seen in  and  for bilateral hand weakness. Did occupational therapy. Was working with Dr. Pat Patterson.  Then saw Dr. Gonsalves on 2023 and 2023, has been using multiple wrist splints and Voltaren gel.  No previous injections.  Suspected probable de Quervain's tenosynovitis bilaterally and cervical radiculopathy contributing to her pain, not classic for CTS.  Recommended consideration of cervical JASE and repeat EMG if needed.    - Pain Character:    - Location:  bilateral base of thumbs into radial wrist and bilateral long fingers  - Character:  burning, pins and needles, electric shock  - Duration:  chronic  - Course:  worsening  - Endorses:    - numbness, tingling occasionally in the superficial radial nerve distribution, hand weakness, right wrist clicking, right long finger triggering, swelling in fingers  - Denies:    - instability, radicular shooting pain  - Alleviating factors:    -  thumb spica  wrist braces , topicals  - Aggravating factors:    - gripping, weather changes  - Other treatments tried:    - thumb spica wrist braces (helpful but limiting what she can do with braces on), Voltaren, unable to take IBU, heat, cold, Lidocaine patches, topical     - Patient Goals:    - Referred for hand/wrist injections  - Social History:   -  Currently not working: working on getting on disability      Review of Systems  Musculoskeletal: as above  Remainder of review of systems is negative including constitutional, CV, pulmonary, GI, Skin and Neurologic except as noted in HPI or medical history.    Past Medical History:   Diagnosis Date     Abdominal pain, right lower quadrant 03/09/2008    Admit. Discharged 03/10/08     Anxiety attack 07/31/2015     Atypical chest pain 06/23/2015     De Quervain's disease (tenosynovitis)      Dehydration      Depressive disorder 1996     Gastric ulcer 07/31/2015     GERD (gastroesophageal reflux disease) 07/28/2010    Takes omeprazole and metoclopramide      Hypertension 2017     Ingrowing nail 01/09/2014     Migraines      Opioid dependence in remission (H) 08/02/2015     Other and unspecified ovarian cyst      Papanicolaou smear of cervix with low grade squamous intraepithelial lesion (LGSIL) 07/07/2017     Past Surgical History:   Procedure Laterality Date     BIOPSY CERVICAL, LOCAL EXCISION, SINGLE/MULTIPLE N/A 8/10/2017    Procedure: BIOPSY CERVICAL, LOCAL EXCISION, SINGLE/MULTIPLE;;  Surgeon: Michael Chandler MD;  [facility]     COLONOSCOPY N/A 1/6/2023    Procedure: COLONOSCOPY;  Surgeon: Michael Dozier MD;  [facility]     COLPOSCOPY, BIOPSY, COMBINED N/A 8/10/2017    Procedure: COMBINED COLPOSCOPY, BIOPSY;  Colposcopy with Cervical Biopsies and Endometrial Biopsy, Exam with Ultrasound;  Surgeon: Michael Chandler MD;  [facility]     ESOPHAGOSCOPY, GASTROSCOPY, DUODENOSCOPY (EGD), COMBINED N/A 4/17/2017    Procedure: COMBINED  ESOPHAGOSCOPY, GASTROSCOPY, DUODENOSCOPY (EGD);  Surgeon: Ibrahima Esposito MD;  [facility]     ESOPHAGOSCOPY, GASTROSCOPY, DUODENOSCOPY (EGD), COMBINED N/A 1/6/2023    Procedure: ESOPHAGOGASTRODUODENOSCOPY, WITH BIOPSY;  Surgeon: Michael Dozier MD;  [facility]     ESOPHAGOSCOPY, GASTROSCOPY, DUODENOSCOPY (EGD), COMBINED N/A 10/3/2023    Procedure: ESOPHAGOGASTRODUODENOSCOPY, WITH BIOPSY;  Surgeon: John Paul Varela MD;  [facility]     EXAM UNDER ANESTHESIA PELVIC N/A 8/10/2017    Procedure: EXAM UNDER ANESTHESIA PELVIC;;  Surgeon: Michael Chandler MD;  [facility]     HYSTERECTOMY       HYSTERECTOMY, PAP NO LONGER INDICATED       INJECT EPIDURAL CERVICAL N/A 10/20/2023    Procedure: Cervical 6-7 Interlaminar epidural steroid injection using fluoroscopic guidance with contrast dye.;  Surgeon: Trevor Ivory MD;  [facility]     INJECT EPIDURAL CERVICAL N/A 4/19/2024    Procedure: Cervical 6 - Cervical 7 Interlaminar epidural steroid injection using fluoroscopic guidance with contrast dye.;  Surgeon: Trevor Ivory MD;  [facility]     INJECT EPIDURAL LUMBAR Bilateral 7/1/2022    Procedure: Lumbar 5-Sacral 1 Transforaminal Epidural Steroid Injection with fluoroscopic guidance and contrast, bilateral;  Surgeon: Trevor Ivory MD;  [facility]     LAPAROSCOPIC CHOLECYSTECTOMY N/A 2/2/2018    Procedure: LAPAROSCOPIC CHOLECYSTECTOMY;  Laparoscopic Cholecystectomy;  Surgeon: Tigre Lowry DO;  [facility]     LAPAROSCOPIC HYSTERECTOMY TOTAL N/A 10/30/2017    Procedure: LAPAROSCOPIC HYSTERECTOMY TOTAL;  LAPAROSCOPIC HYSTERECTOMY TOTAL POSSIBLE SALPINGO-OOPHERECTOMY (BILATERAL);  Surgeon: Michael Chandler MD;  [facility]     ZZHC UGI ENDOSCOPY, SIMPLE EXAM  01/07/08     Family History   Problem Relation Age of Onset     Depression Mother      Respiratory Mother      Chronic Obstructive Pulmonary Disease Mother      Hypertension Mother      Anxiety  Disorder Mother      Cerebrovascular Disease Father         First stroke at age 28,  from 2nd when he was 55. Brain aneurysms.     Breast Cancer Cousin      Adrenal Disorder Other      Chronic Obstructive Pulmonary Disease Other      Asthma Brother          Objective  Wt 79.8 kg (176 lb)   LMP  (LMP Unknown)   BMI 30.21 kg/m      General: healthy, alert and in no acute distress.    HEENT: no scleral icterus or conjunctival erythema.   Skin: no suspicious lesions or rash. No jaundice.   CV: regular rhythm by palpation, 2+ distal pulses.  Resp: normal respiratory effort without conversational dyspnea.   Psych: normal mood and affect.    Gait: nonantalgic, appropriate coordination and balance.     Neuro:        - Sensation to light touch:    - Intact throughout the BUE including all peripheral nerve distributions.        - Special tests:   - Tinel's at the wrist:  Neg    - Tinel's at the elbow:  Neg    - Stressed Phalen's:  Neg     MSK - Wrist/Hand:        - Inspection:    -Moderate swelling present over the radial wrist and in the third digit.  No surrounding erythema, warmth, ecchymosis, lesion, or atrophy noted.        - ROM:    - Full AROM/PROM with pain during thumb abduction, extension       - Palpation:    - TTP at the de Quervain's tendon sheath bilaterally, right worse than left.   - NTTP elsewhere.        - Strength:  (*antalgic)   - Forearm Pronation   5   - Forearm Supination   5   - Wrist Extension   5   - Wrist Flexion    5   - FDI     5   - ADM     5   - FPL     5   - APB     5   - EIP     5   - EDC     5   - APL/EPB    5-*            - Special tests:        - CMC grind:  Neg    - Finkelstein's:  ++Pos, R > L   - TFCC compression:  Neg    - Fulton's:  Neg       Radiology  I independently reviewed the available relevant imaging in the chart with my interpretations as above in HPI.     I independently reviewed today's new relevant imaging, with the following interpretation:  - XR B/L hands 2024  shows normal anatomic alignment without significant degenerative changes, no acute fractures or dislocations.      Assessment  1. De Quervain's tenosynovitis, bilateral    2. Bilateral hand pain        Plan  Radha Beckwith is a pleasant 48 year old female that presents with chronic bilateral hand pain, right worse than left.  She has multiple areas of pain, worst is in the radial wrist and thumb that hurts the most with repetitive hand motions, hand , thumb abduction/extension.  Very positive Finkelstein's maneuver today on exam.  She does have some pain elsewhere in the wrists and some numbness/tingling in the hand.  The numbness and tingling appears most consistent with superficial radial neuropathy, may have some CTS paresthesias as well.  Radiographs within normal limits without significant degenerative changes, fractures, or dislocations.  She has been trying thumb spica bracing in the past which she wears only at night, and has not helped fully, no injections in the past.  No significant radicular shooting pain throughout the arm.  History and physical exam appear most consistent with de Quervain's tenosynovitis bilaterally, right worse than left.  She does not seem to have a major component of cervical radiculopathy affecting her symptoms.    We discussed the nature of the condition and available treatment options, and mutually agreed upon the following plan:    She would like to start by trying a Medrol Dosepak to see how much pain relief she gets from the burst pack, she will keep a pain log of what pain improves and what pain does.  Instructed on proper use of the thumb spica brace which will be to use it during exacerbating activities, do not necessarily need to wear it at night or at rest unless she finds it helpful.  She will try this new regimen for thumb spica bracing.  Also talked about more low-profile braces such as a gamekeeper brace, which she may take from clinic or purchase on her own.   Try to gamekeeper brace on in clinic and decided not to take 1 today.  She will use Voltaren gel and other topicals with Tylenol as needed for breakthrough pain.    We will follow-up in 2 to 3 weeks to evaluate how much improvement she got from the Medrol Dosepak and bracing modifications, may consider de Quervain's tendon sheath corticosteroid injection at that time if desired.  May contact clinic for questions/concerns as needed.      Jose Adame DO, CAQSM  [facility]  [facility] - Department of Orthopedic Surgery       Disclaimer:  This note was prepared and written using Dragon Medical dictation software. As a result, there may be errors in the script that have gone undetected. Please consider this when interpreting the information in this note.       Again, thank you for allowing me to participate in the care of your patient.        Sincerely,        Jose Adame DO

## 2024-06-06 NOTE — PROGRESS NOTES
[facility]                                     Progress Note    Patient Name: Radha Beckwith  Date: 6/6/24         Service Type: Individual      Session Start Time: 2:00pm Session End Time: 3:00pm     Session Length: 60     Session #: 39    Attendees: Client    Service Modality:  Video Visit:      Provider verified identity through the following two step process.  Patient provided:  Patient is known previously to provider    Telemedicine Visit: The patient's condition can be safely assessed and treated via synchronous audio and visual telemedicine encounter.      Reason for Telemedicine Visit: Patient convenience (e.g. access to timely appointments / distance to available provider)    Originating Site (Patient Location): Patient's home    Distant Site (Provider Location): Provider Remote Setting- Home Office    Consent:  The patient/guardian has verbally consented to: the potential risks and benefits of telemedicine (video visit) versus in person care; bill my insurance or make self-payment for services provided; and responsibility for payment of non-covered services.     Patient would like the video invitation sent by:  My Chart    Mode of Communication:  Video Conference via [facility]    Distant Location (Provider):  On-site    As the provider I attest to compliance with applicable laws and regulations related to telemedicine.    DATA  Interactive Complexity: No  Crisis: No        Progress Since Last Session (Related to Symptoms / Goals / Homework):   Symptoms: No change .    Homework: Completed in session      Episode of Care Goals: Satisfactory progress - MAINTENANCE (Working to maintain change, with risk of relapse); Intervened by continuing to positively reinforce healthy behavior choice      Current / Ongoing Stressors and Concerns:   The client had an orthopedic appointment today and they wanted to do an xray.    Her partners parents are coming over tonight to drop something off and  the client is worried about the condition of the house. She said her boyfriend is the one who gets really anxious about this more so.   Her son turned 27 on June 1.        Treatment Objective(s) Addressed in This Session:   Increase interest, engagement, and pleasure in doing things  Feel less tired and more energy during the day        Intervention:   Continued to process feelings about her relationship and coached her on how she can talk with her boyfriend about her fears and concerns. Reviewed coping skills she can utilize every day to help decrease activation around medical issues and her boyfriends moods. Also encouraged the client to really work on self care and boundaries for herself too.         Assessments completed prior to visit:  The following assessments were completed by patient for this visit:  PHQ9:       4/10/2024     1:59 PM 4/17/2024     1:20 PM 4/22/2024     3:31 PM 5/2/2024     1:10 PM 5/9/2024    12:29 PM 5/14/2024     2:05 PM 5/23/2024     5:46 AM   PHQ-9 SCORE   PHQ-9 Total Score MyChart 22 (Severe depression) 24 (Severe depression) 24 (Severe depression) 24 (Severe depression) 24 (Severe depression) 24 (Severe depression) 23 (Severe depression)   PHQ-9 Total Score 22 24 24 24 24 24 23     GAD7:       2/5/2024     6:27 PM 2/19/2024     3:39 PM 3/12/2024     8:32 PM 3/27/2024     8:51 PM 4/17/2024     1:23 PM 5/2/2024     1:11 PM 5/23/2024     5:47 AM   BO-7 SCORE   Total Score 21 (severe anxiety) 19 (severe anxiety) 20 (severe anxiety) 19 (severe anxiety) 21 (severe anxiety) 21 (severe anxiety) 21 (severe anxiety)   Total Score 21 19 20 19 21    21 21    21 21     PROMIS 10-Global Health (all questions and answers displayed):       11/26/2023    10:43 PM 2/27/2024     6:40 PM 3/12/2024     8:35 PM 3/27/2024     8:53 PM 4/2/2024     4:38 PM 4/10/2024     1:58 PM 4/17/2024     1:28 PM   PROMIS 10   In general, would you say your health is: Fair Fair Poor Poor Poor Poor Poor   In general, would  you say your quality of life is: Poor Fair Poor Poor Poor Poor Poor   In general, how would you rate your physical health? Poor Poor Poor Poor Poor Poor Poor   In general, how would you rate your mental health, including your mood and your ability to think? Poor Fair Poor Poor Poor Poor Poor   In general, how would you rate your satisfaction with your social activities and relationships? Poor Poor Poor Poor Poor Poor Poor   In general, please rate how well you carry out your usual social activities and roles Fair Poor Poor Poor Poor Fair Poor   To what extent are you able to carry out your everyday physical activities such as walking, climbing stairs, carrying groceries, or moving a chair? A little A little A little A little A little A little A little   In the past 7 days, how often have you been bothered by emotional problems such as feeling anxious, depressed, or irritable? Always Always Always Always Always Always Always   In the past 7 days, how would you rate your fatigue on average? Severe Severe Severe Severe Severe Severe Very severe   In the past 7 days, how would you rate your pain on average, where 0 means no pain, and 10 means worst imaginable pain? 7 7 8 7 7 7 7   In general, would you say your health is: 2 2 1 1 1 1 1   In general, would you say your quality of life is: 1 2 1 1 1 1 1   In general, how would you rate your physical health? 1 1 1 1 1 1 1   In general, how would you rate your mental health, including your mood and your ability to think? 1 2 1 1 1 1 1   In general, how would you rate your satisfaction with your social activities and relationships? 1 1 1 1 1 1 1   In general, please rate how well you carry out your usual social activities and roles. (This includes activities at home, at work and in your community, and responsibilities as a parent, child, spouse, employee, friend, etc.) 2 1 1 1 1 2 1   To what extent are you able to carry out your everyday physical activities such as walking,  climbing stairs, carrying groceries, or moving a chair? 2 2 2 2 2 2 2   In the past 7 days, how often have you been bothered by emotional problems such as feeling anxious, depressed, or irritable? 5 5 5 5 5 5 5   In the past 7 days, how would you rate your fatigue on average? 4 4 4 4 4 4 5   In the past 7 days, how would you rate your pain on average, where 0 means no pain, and 10 means worst imaginable pain? 7 7 8 7 7 7 7   Global Mental Health Score 4 6 4 4 4 4    4 4    4   Global Physical Health Score 7 7 7 7 7 7    7 6    6   PROMIS TOTAL - SUBSCORES 11 13 11 11 11 11    11 10    10     [place] Suicide Severity Rating Scale (Lifetime/Recent)      10/4/2022    11:00 AM 5/13/2024     3:13 PM   [place] Suicide Severity Rating (Lifetime/Recent)   Q1 Wished to be Dead (Past Month) no 0-->no   Q2 Suicidal Thoughts (Past Month) no 0-->no   Q3 Suicidal Thought Method no    Q4 Suicidal Intent without Specific Plan no    Q5 Suicide Intent with Specific Plan yes    Q6 Suicide Behavior (Lifetime) yes 0-->no   Within the Past 3 Months? no    Level of Risk per Screen high risk no risks indicated         ASSESSMENT: Current Emotional / Mental Status (status of significant symptoms):   Risk status (Self / Other harm or suicidal ideation)   Patient denies current fears or concerns for personal safety.   Patient denies current or recent suicidal ideation or behaviors.   Patient denies current or recent homicidal ideation or behaviors.   Patient denies current or recent self injurious behavior or ideation.   Patient denies other safety concerns.   Patient reports there has been no change in risk factors since their last session.     Patient reports there has been no change in protective factors since their last session.     Recommended that patient call 911 or go to the local ED should there be a change in any of these risk factors.     Appearance:   Appropriate    Eye Contact:   Good    Psychomotor Behavior: Normal     Attitude:   Cooperative    Orientation:   All   Speech    Rate / Production: Normal/ Responsive Normal     Volume:  Normal    Mood:    Depressed  Normal   Affect:    Appropriate  Tearful   Thought Content:  Clear  Rumination    Thought Form:  Coherent    Insight:    Good      Medication Review:   No changes to current psychiatric medication(s)     Medication Compliance:   Yes     Changes in Health Issues:   None reported     Chemical Use Review:   Substance Use: Chemical use reviewed, no active concerns identified      Tobacco Use: No change in amount of tobacco use since last session.  Patient declined discussion at this time    Diagnosis:  1. Moderate episode of recurrent major depressive disorder (H)    2. Generalized anxiety disorder          Collateral Reports Completed:   Not Applicable    PLAN: (Patient Tasks / Therapist Tasks / Other)  Continue to continue to work on self care/self boundaries, talking to her partner if she can.         Ricarda Bhatia, [facility]                                                         ______________________________________________________________________    Individual Treatment Plan    Patient's Name: Radha Beckwith  YOB: 1975    Date of Creation: 6/28/23  Date Treatment Plan Last Reviewed/Revised: 5/9/24    DSM5 Diagnoses: 296.32 (F33.1) Major Depressive Disorder, Recurrent Episode, Moderate _  Psychosocial / Contextual Factors: living with boyfriend, memory issues  PROMIS (reviewed every 90 days):     Referral / Collaboration:  Referral to another professional/service is not indicated at this time..    Anticipated number of session for this episode of care: 9-12 sessions  Anticipation frequency of session:  as needed  Anticipated Duration of each session: 38-52 minutes  Treatment plan will be reviewed in 90 days or when goals have been changed.       MeasurableTreatment Goal(s) related to diagnosis / functional impairment(s)  Goal 1: Patient will increase  communication skills with family members.    Objective #A (Patient Action)    Patient will learn & utilize at least 2 assertive communication skills weekly.  Status: Continued - Date(s): 5/9/24    Intervention(s)  Therapist will teach emotional regulation skills. distress tolerance skills, interpersonal effectiveness skills, emotion regluation skills, mindfulness skills, radical acceptance. Therapist will teach client how to ID body cues for anxiety, anxiety reduction techniques, how to ID triggers for depression and anxiety- decrease reactivity/eliminate, lifestyle changes to reduce depression and anxiety, communication skills, explore cognitive beliefs and help client to develop healthy cognitive patterns and beliefs.    Objective #B  Patient will use thought-stopping strategy daily to reduce intrusive thoughts.  Status: Continued - Date(s): 5/9/24    Intervention(s)  Therapist will teach emotional regulation skills. distress tolerance skills, interpersonal effectiveness skills, emotion regluation skills, mindfulness skills, radical acceptance. Therapist will teach client how to ID body cues for anxiety, anxiety reduction techniques, how to ID triggers for depression and anxiety- decrease reactivity/eliminate, lifestyle changes to reduce depression and anxiety, communication skills, explore cognitive beliefs and help client to develop healthy cognitive patterns and beliefs.      Goal 2: Patient will decrease depression symptoms.      Objective #A (Patient Action)    Status: Continued - Date(s): 5/9/24    Patient will Increase interest, engagement, and pleasure in doing things  Decrease frequency and intensity of feeling down, depressed, hopeless  Improve quantity and quality of night time sleep / decrease daytime naps  Feel less tired and more energy during the day   Improve diet, appetite, mindful eating, and / or meal planning  Identify negative self-talk and behaviors: challenge core beliefs, myths, and  actions  Improve concentration, focus, and mindfulness in daily activities   Feel less fidgety, restless or slow in daily activities / interpersonal interactions.    Intervention(s)  Therapist will teach emotional regulation skills. distress tolerance skills, interpersonal effectiveness skills, emotion regluation skills, mindfulness skills, radical acceptance. Therapist will teach client how to ID body cues for anxiety, anxiety reduction techniques, how to ID triggers for depression and anxiety- decrease reactivity/eliminate, lifestyle changes to reduce depression and anxiety, communication skills, explore cognitive beliefs and help client to develop healthy cognitive patterns and beliefs.    Objective #B  Patient will identify three distraction and diversion activities and use those activities to decrease level of anxiety  .    Status: Continued - Date(s):  5/9/24    Intervention(s)  Therapist will teach emotional regulation skills. distress tolerance skills, interpersonal effectiveness skills, emotion regluation skills, mindfulness skills, radical acceptance. Therapist will teach client how to ID body cues for anxiety, anxiety reduction techniques, how to ID triggers for depression and anxiety- decrease reactivity/eliminate, lifestyle changes to reduce depression and anxiety, communication skills, explore cognitive beliefs and help client to develop healthy cognitive patterns and beliefs.      Patient has reviewed and agreed to the above plan.      Ricarda Bhatia [facility]

## 2024-06-07 NOTE — TELEPHONE ENCOUNTER
PRIOR AUTHORIZATION DENIED    Medication: Linz[employer]-PA DENIED     Denial Date: 5/31/2024    Denial Rational:           Appeal Information:              che referral renewal  Received: Today  Mercedez Garcia, [address] [facility]  [facility] [address] [facility] Clinical Support Pool  Pt follows annually with [facility] regarding Eliquis.  Please submit referral renewal using  if you would like patient to continue to be followed for Eliquis.        Thanks,   Li Patel, PharmD, BCPS, CACP, CTTS 7/8/2022 10:15 AM

## 2024-06-10 ENCOUNTER — THERAPY VISIT (OUTPATIENT)
Dept: PHYSICAL THERAPY | Facility: CLINIC | Age: 49
End: 2024-06-10
Attending: PREVENTIVE MEDICINE
Payer: COMMERCIAL

## 2024-06-10 DIAGNOSIS — M54.2 CERVICALGIA: Primary | ICD-10-CM

## 2024-06-10 PROCEDURE — 97530 THERAPEUTIC ACTIVITIES: CPT | Mod: GP | Performed by: PHYSICAL THERAPIST

## 2024-06-10 PROCEDURE — 97140 MANUAL THERAPY 1/> REGIONS: CPT | Mod: GP | Performed by: PHYSICAL THERAPIST

## 2024-06-10 PROCEDURE — 97110 THERAPEUTIC EXERCISES: CPT | Mod: GP | Performed by: PHYSICAL THERAPIST

## 2024-06-11 DIAGNOSIS — G25.81 RESTLESS LEGS SYNDROME (RLS): ICD-10-CM

## 2024-06-11 RX ORDER — ROPINIROLE 1 MG/1
TABLET, FILM COATED ORAL
Qty: 90 TABLET | Refills: 0 | Status: SHIPPED | OUTPATIENT
Start: 2024-06-11 | End: 2024-09-13

## 2024-06-11 NOTE — PROGRESS NOTES
[facility]                                                                                   OUTPATIENT PHYSICAL THERAPY    PLAN OF TREATMENT FOR OUTPATIENT REHABILITATION   Patient's Last Name, First Name, Radha Ruiz YOB: 1975   Provider's Name   [facility]   Medical Record No.  [id number]     Onset Date: 02/12/24 (Date of referral)  Start of Care Date: 04/02/24     Medical Diagnosis:  Cervicalgia (M54.2)  - Primary      PT Treatment Diagnosis:  Cervicalgia (M54.2)  - Primary Plan of Treatment  Frequency/Duration: 1-2x/week/ 6-8 weeks    Certification date from 06/10/24 to 08/05/24         See note for plan of treatment details and functional goals     Edith Mills PT                         I CERTIFY THE NEED FOR THESE SERVICES FURNISHED UNDER        THIS PLAN OF TREATMENT AND WHILE UNDER MY CARE     (Physician attestation of this document indicates review and certification of the therapy plan).              Referring Provider:  Micha Hough     Initial Assessment  See Epic Evaluation- Start of Care Date: 04/02/24            PLAN  Continue therapy per current plan of care.  Goal progress made, patient has need for multimodal cares.     Beginning/End Dates of Progress Note Reporting Period:    4/2/2024 to 06/10/2024    Referring Provider:  Micha Hough        06/10/24 0500   Appointment Info   Signing clinician's name / credentials Edith Mills, PT, DPT   Total/Authorized Visits 8   Visits Used 8/10   Medical Diagnosis Cervicalgia (M54.2)  - Primary   PT Tx Diagnosis Cervicalgia (M54.2)  - Primary   Precautions/Limitations Hypersensitive system   Quick Adds Certification   Progress Note/Certification   Start of Care Date 04/02/24   Onset of illness/injury or Date of Surgery 02/12/24  (Date of referral)   Therapy Frequency 1-2x/week   Predicted Duration 6-8 weeks   Certification date from 06/10/24   Certification  "date to 08/05/24   Progress Note Due Date 06/10/24   PT Goal 4   Goal Identifier NDI   Goal Description Patient will have NDI reduced from 74% down to <45% to better improve tolerance to ADLs with reduced pain   Goal Progress 53.3 down from 74% at evaluation   Target Date 05/28/24   PT Goal 5   Goal Identifier Headaches/neck pain   Goal Description Patient notes 50% reduced headache and neck pain on computer for 30 minutes   Target Date 05/14/24   Goal Progress 30%   Subjective Report   Subjective Report Noticing clicking in the neck with walking, states notices it with the WB on the left. She hasnt noted it as much with walking inside the house. Pain is 4/10 in sitting, \"its there\" 5/10. Stress levels about the same, still pushing herself in the garden with bending, reaching in the garden yesterday and doing yardwork.   Objective Measure 1   Objective Measure Pain   Details 5/10   Objective Measure 2   Objective Measure NDI   Details 53% on 6/3/2024, 74% at evaluation   Objective Measure 3   Objective Measure % improvement in her neck   Details 30%    Objective Measure 4   Objective Measure CROM   Details L rotation 54 degrees L sided neck stretch; feels tightness R cervical (eval 48)  R rotation 48 degrees with L neck tension shoulder tension stretch  (Eval 45 )  Retraction 15-21 cm and stiff,  B paraspinal tension, protraction WNL (Eval 18-21)  L SB 38 R upper neck tension  (32 eval)  R SB 34 deg R SB L cervical tension to base of head, R sided UT pain (eval 27 deg)  Flexion 44 degrees \"crack\" feeling in neck , UT tension base of head  (eval 25)  Extension:  68 degrees (70 degrees eval) and feels the pressure in the middle neck and base of neck  and anterior neck stretch    After ROM states some burning  along the jawline and \"I have a headache\" starting 3-4 minutes after   Therapeutic Procedure/Exercise   Therapeutic Procedures: strength, endurance, ROM, flexibility minutes (09093) 10   PTRx Ther Proc 1 - " Details Neck ROM see above, ROM improved most directions so far. Neck stretches, unloading with towel roll, slight proraction with HA onset to assess jaw cotributions in referral pain or if her mouth pain is from her dental issue. Reviewed safe but sore and TNE concepts to reassure patient that movement is safe. Note taking helpful but avoid hypervigilence. Patient is aware of the role of movement vs rest, and listening to body, taking breaks when needed vs pusthing beyond boundaries.   Skilled Intervention Gets nasal and sinus congesting laying on her back, will talk with ENT about it or GI provider if GERD related   Therapeutic Activity   Therapeutic Activities: dynamic activities to improve functional performance minutes (05722) 10   Ther Act 1 - Details Active listening on patients current management of stressors, yellow flags (patietnt feels conflicted on her medical guidance - 1 provider said use brace at rest, another said not to). Discussed coping with unhealthy relationship at home, needing to use coping to manage constant emotional upheaval and its contriubtors to pain in the body and stress related tensions. Still benefitting from pain neuroscience education, active teaching on pacing and listening to body, and combined MT helpful   Skilled Intervention HAs a bit more but also endorses stress is contributing with relationship stressors   Manual Therapy   Manual Therapy: Mobilization, MFR, MLD, friction massage minutes (62540) 20   Manual Therapy 1 - Details TPR B UT, 1st rib, deep breathing, Paraspinal release, Gr II glides cervical , OA spread, SCM, scalenes insertion release, subclavius and clavicle rolling   Skilled Intervention MT to reduce mm tension and improve postural flexiblity   Patient Response/Progress Feels light laying down add to nasal congestion though doesnt have anything draining..   Total Session Time   Timed Code Treatment Minutes 40   Total Treatment Time (sum of timed and untimed  services) 40

## 2024-06-12 ENCOUNTER — THERAPY VISIT (OUTPATIENT)
Dept: PHYSICAL THERAPY | Facility: CLINIC | Age: 49
End: 2024-06-12
Attending: PREVENTIVE MEDICINE
Payer: COMMERCIAL

## 2024-06-12 DIAGNOSIS — M54.2 CERVICALGIA: Primary | ICD-10-CM

## 2024-06-12 PROCEDURE — 97110 THERAPEUTIC EXERCISES: CPT | Mod: GP | Performed by: PHYSICAL THERAPIST

## 2024-06-12 PROCEDURE — 97140 MANUAL THERAPY 1/> REGIONS: CPT | Mod: GP | Performed by: PHYSICAL THERAPIST

## 2024-06-12 ASSESSMENT — ANXIETY QUESTIONNAIRES
6. BECOMING EASILY ANNOYED OR IRRITABLE: NEARLY EVERY DAY
1. FEELING NERVOUS, ANXIOUS, OR ON EDGE: NEARLY EVERY DAY
7. FEELING AFRAID AS IF SOMETHING AWFUL MIGHT HAPPEN: NEARLY EVERY DAY
3. WORRYING TOO MUCH ABOUT DIFFERENT THINGS: NEARLY EVERY DAY
GAD7 TOTAL SCORE: 21
5. BEING SO RESTLESS THAT IT IS HARD TO SIT STILL: NEARLY EVERY DAY
2. NOT BEING ABLE TO STOP OR CONTROL WORRYING: NEARLY EVERY DAY
7. FEELING AFRAID AS IF SOMETHING AWFUL MIGHT HAPPEN: NEARLY EVERY DAY
GAD7 TOTAL SCORE: 21
4. TROUBLE RELAXING: NEARLY EVERY DAY
GAD7 TOTAL SCORE: 21
IF YOU CHECKED OFF ANY PROBLEMS ON THIS QUESTIONNAIRE, HOW DIFFICULT HAVE THESE PROBLEMS MADE IT FOR YOU TO DO YOUR WORK, TAKE CARE OF THINGS AT HOME, OR GET ALONG WITH OTHER PEOPLE: EXTREMELY DIFFICULT
8. IF YOU CHECKED OFF ANY PROBLEMS, HOW DIFFICULT HAVE THESE MADE IT FOR YOU TO DO YOUR WORK, TAKE CARE OF THINGS AT HOME, OR GET ALONG WITH OTHER PEOPLE?: EXTREMELY DIFFICULT

## 2024-06-13 ENCOUNTER — VIRTUAL VISIT (OUTPATIENT)
Dept: PSYCHOLOGY | Facility: CLINIC | Age: 49
End: 2024-06-13
Payer: COMMERCIAL

## 2024-06-13 DIAGNOSIS — F33.1 MODERATE EPISODE OF RECURRENT MAJOR DEPRESSIVE DISORDER (H): Primary | ICD-10-CM

## 2024-06-13 DIAGNOSIS — F41.1 GENERALIZED ANXIETY DISORDER: ICD-10-CM

## 2024-06-13 PROCEDURE — 90837 PSYTX W PT 60 MINUTES: CPT | Mod: 95 | Performed by: COUNSELOR

## 2024-06-13 NOTE — PROGRESS NOTES
Answers submitted by the patient for this visit:  Patient Health Questionnaire (Submitted on 6/12/2024)  If you checked off any problems, how difficult have these problems made it for you to do your work, take care of things at home, or get along with other people?: Extremely difficult  PHQ9 TOTAL SCORE: 21  BO-7 (Submitted on 6/12/2024)  BO 7 TOTAL SCORE: 21        [facility]                                     Progress Note    Patient Name: Radha Beckwith  Date: 6/13/24         Service Type: Individual      Session Start Time: 12:06pm Session End Time: 1:00pm     Session Length:  54    Session #: 40    Attendees: Client    Service Modality:  Video Visit:      Provider verified identity through the following two step process.  Patient provided:  Patient is known previously to provider    Telemedicine Visit: The patient's condition can be safely assessed and treated via synchronous audio and visual telemedicine encounter.      Reason for Telemedicine Visit: Patient convenience (e.g. access to timely appointments / distance to available provider)    Originating Site (Patient Location): Patient's home    Distant Site (Provider Location): Provider Remote Setting- Home Office    Consent:  The patient/guardian has verbally consented to: the potential risks and benefits of telemedicine (video visit) versus in person care; bill my insurance or make self-payment for services provided; and responsibility for payment of non-covered services.     Patient would like the video invitation sent by:  My Chart    Mode of Communication:  Video Conference via [facility]well    Distant Location (Provider):  On-site    As the provider I attest to compliance with applicable laws and regulations related to telemedicine.    DATA  Interactive Complexity: No  Crisis: No        Progress Since Last Session (Related to Symptoms / Goals / Homework):   Symptoms: No change .    Homework: Completed in session      Episode of Care  Goals: Satisfactory progress - MAINTENANCE (Working to maintain change, with risk of relapse); Intervened by continuing to positively reinforce healthy behavior choice      Current / Ongoing Stressors and Concerns:   The client thought her session was at 2pm today which is why the session started late.   Stated she had an interesting interaction with her son via text where he responded very strictly to her. This really heightened her anxiety.  Psychirist changed medication from ambien to tamezapam at night     Treatment Objective(s) Addressed in This Session:   Increase interest, engagement, and pleasure in doing things  Feel less tired and more energy during the day        Intervention:   Talked about the recent interaction with her son and options she has on how to talk to him about it. Reinforced the client for the skills she used in the moment to deal with that text he sent her. Validated her feelings around it. Talked about the current issues in her relationship with her boyfriend. The client continues to not be able to talk with him about her feelings. Feels like he has no empathy. Continuing to work on her disability paperwork.          Assessments completed prior to visit:  The following assessments were completed by patient for this visit:  PHQ9:       4/17/2024     1:20 PM 4/22/2024     3:31 PM 5/2/2024     1:10 PM 5/9/2024    12:29 PM 5/14/2024     2:05 PM 5/23/2024     5:46 AM 6/12/2024     4:05 PM   PHQ-9 SCORE   PHQ-9 Total Score MyChart 24 (Severe depression) 24 (Severe depression) 24 (Severe depression) 24 (Severe depression) 24 (Severe depression) 23 (Severe depression) 21 (Severe depression)   PHQ-9 Total Score 24 24 24 24 24 23 21     GAD7:       2/19/2024     3:39 PM 3/12/2024     8:32 PM 3/27/2024     8:51 PM 4/17/2024     1:23 PM 5/2/2024     1:11 PM 5/23/2024     5:47 AM 6/12/2024     4:08 PM   BO-7 SCORE   Total Score 19 (severe anxiety) 20 (severe anxiety) 19 (severe anxiety) 21 (severe  anxiety) 21 (severe anxiety) 21 (severe anxiety) 21 (severe anxiety)   Total Score 19 20 19 21    21 21    21 21 21     PROMIS 10-Global Health (all questions and answers displayed):       11/26/2023    10:43 PM 2/27/2024     6:40 PM 3/12/2024     8:35 PM 3/27/2024     8:53 PM 4/2/2024     4:38 PM 4/10/2024     1:58 PM 4/17/2024     1:28 PM   PROMIS 10   In general, would you say your health is: Fair Fair Poor Poor Poor Poor Poor   In general, would you say your quality of life is: Poor Fair Poor Poor Poor Poor Poor   In general, how would you rate your physical health? Poor Poor Poor Poor Poor Poor Poor   In general, how would you rate your mental health, including your mood and your ability to think? Poor Fair Poor Poor Poor Poor Poor   In general, how would you rate your satisfaction with your social activities and relationships? Poor Poor Poor Poor Poor Poor Poor   In general, please rate how well you carry out your usual social activities and roles Fair Poor Poor Poor Poor Fair Poor   To what extent are you able to carry out your everyday physical activities such as walking, climbing stairs, carrying groceries, or moving a chair? A little A little A little A little A little A little A little   In the past 7 days, how often have you been bothered by emotional problems such as feeling anxious, depressed, or irritable? Always Always Always Always Always Always Always   In the past 7 days, how would you rate your fatigue on average? Severe Severe Severe Severe Severe Severe Very severe   In the past 7 days, how would you rate your pain on average, where 0 means no pain, and 10 means worst imaginable pain? 7 7 8 7 7 7 7   In general, would you say your health is: 2 2 1 1 1 1 1   In general, would you say your quality of life is: 1 2 1 1 1 1 1   In general, how would you rate your physical health? 1 1 1 1 1 1 1   In general, how would you rate your mental health, including your mood and your ability to think? 1 2 1 1  1 1 1   In general, how would you rate your satisfaction with your social activities and relationships? 1 1 1 1 1 1 1   In general, please rate how well you carry out your usual social activities and roles. (This includes activities at home, at work and in your community, and responsibilities as a parent, child, spouse, employee, friend, etc.) 2 1 1 1 1 2 1   To what extent are you able to carry out your everyday physical activities such as walking, climbing stairs, carrying groceries, or moving a chair? 2 2 2 2 2 2 2   In the past 7 days, how often have you been bothered by emotional problems such as feeling anxious, depressed, or irritable? 5 5 5 5 5 5 5   In the past 7 days, how would you rate your fatigue on average? 4 4 4 4 4 4 5   In the past 7 days, how would you rate your pain on average, where 0 means no pain, and 10 means worst imaginable pain? 7 7 8 7 7 7 7   Global Mental Health Score 4 6 4 4 4 4    4 4    4   Global Physical Health Score 7 7 7 7 7 7    7 6    6   PROMIS TOTAL - SUBSCORES 11 13 11 11 11 11    11 10    10     [place] Suicide Severity Rating Scale (Lifetime/Recent)      10/4/2022    11:00 AM 5/13/2024     3:13 PM   [place] Suicide Severity Rating (Lifetime/Recent)   Q1 Wished to be Dead (Past Month) no 0-->no   Q2 Suicidal Thoughts (Past Month) no 0-->no   Q3 Suicidal Thought Method no    Q4 Suicidal Intent without Specific Plan no    Q5 Suicide Intent with Specific Plan yes    Q6 Suicide Behavior (Lifetime) yes 0-->no   Within the Past 3 Months? no    Level of Risk per Screen high risk no risks indicated         ASSESSMENT: Current Emotional / Mental Status (status of significant symptoms):   Risk status (Self / Other harm or suicidal ideation)   Patient denies current fears or concerns for personal safety.   Patient denies current or recent suicidal ideation or behaviors.   Patient denies current or recent homicidal ideation or behaviors.   Patient denies current or recent self  injurious behavior or ideation.   Patient denies other safety concerns.   Patient reports there has been no change in risk factors since their last session.     Patient reports there has been no change in protective factors since their last session.     Recommended that patient call 911 or go to the local ED should there be a change in any of these risk factors.     Appearance:   Appropriate    Eye Contact:   Good    Psychomotor Behavior: Normal    Attitude:   Cooperative    Orientation:   All   Speech    Rate / Production: Normal/ Responsive Normal     Volume:  Normal    Mood:    Depressed  Normal   Affect:    Appropriate  Tearful   Thought Content:  Clear  Rumination    Thought Form:  Coherent    Insight:    Good      Medication Review:   No changes to current psychiatric medication(s)     Medication Compliance:   Yes     Changes in Health Issues:   None reported     Chemical Use Review:   Substance Use: Chemical use reviewed, no active concerns identified      Tobacco Use: No change in amount of tobacco use since last session.  Patient declined discussion at this time    Diagnosis:  1. Moderate episode of recurrent major depressive disorder (H)    2. Generalized anxiety disorder          Collateral Reports Completed:   Not Applicable    PLAN: (Patient Tasks / Therapist Tasks / Other)  Continue to continue to work on self care/self boundaries, talking to her partner if she can.         Ricarda Bhatia [facility]                                                         ______________________________________________________________________    Individual Treatment Plan    Patient's Name: Radha Beckwith  YOB: 1975    Date of Creation: 6/28/23  Date Treatment Plan Last Reviewed/Revised: 5/9/24    DSM5 Diagnoses: 296.32 (F33.1) Major Depressive Disorder, Recurrent Episode, Moderate _  Psychosocial / Contextual Factors: living with boyfriend, memory issues  PROMIS (reviewed every 90 days):     Referral /  Collaboration:  Referral to another professional/service is not indicated at this time..    Anticipated number of session for this episode of care: 9-12 sessions  Anticipation frequency of session:  as needed  Anticipated Duration of each session: 38-52 minutes  Treatment plan will be reviewed in 90 days or when goals have been changed.       MeasurableTreatment Goal(s) related to diagnosis / functional impairment(s)  Goal 1: Patient will increase communication skills with family members.    Objective #A (Patient Action)    Patient will learn & utilize at least 2 assertive communication skills weekly.  Status: Continued - Date(s): 5/9/24    Intervention(s)  Therapist will teach emotional regulation skills. distress tolerance skills, interpersonal effectiveness skills, emotion regluation skills, mindfulness skills, radical acceptance. Therapist will teach client how to ID body cues for anxiety, anxiety reduction techniques, how to ID triggers for depression and anxiety- decrease reactivity/eliminate, lifestyle changes to reduce depression and anxiety, communication skills, explore cognitive beliefs and help client to develop healthy cognitive patterns and beliefs.    Objective #B  Patient will use thought-stopping strategy daily to reduce intrusive thoughts.  Status: Continued - Date(s): 5/9/24    Intervention(s)  Therapist will teach emotional regulation skills. distress tolerance skills, interpersonal effectiveness skills, emotion regluation skills, mindfulness skills, radical acceptance. Therapist will teach client how to ID body cues for anxiety, anxiety reduction techniques, how to ID triggers for depression and anxiety- decrease reactivity/eliminate, lifestyle changes to reduce depression and anxiety, communication skills, explore cognitive beliefs and help client to develop healthy cognitive patterns and beliefs.      Goal 2: Patient will decrease depression symptoms.      Objective #A (Patient  Action)    Status: Continued - Date(s): 5/9/24    Patient will Increase interest, engagement, and pleasure in doing things  Decrease frequency and intensity of feeling down, depressed, hopeless  Improve quantity and quality of night time sleep / decrease daytime naps  Feel less tired and more energy during the day   Improve diet, appetite, mindful eating, and / or meal planning  Identify negative self-talk and behaviors: challenge core beliefs, myths, and actions  Improve concentration, focus, and mindfulness in daily activities   Feel less fidgety, restless or slow in daily activities / interpersonal interactions.    Intervention(s)  Therapist will teach emotional regulation skills. distress tolerance skills, interpersonal effectiveness skills, emotion regluation skills, mindfulness skills, radical acceptance. Therapist will teach client how to ID body cues for anxiety, anxiety reduction techniques, how to ID triggers for depression and anxiety- decrease reactivity/eliminate, lifestyle changes to reduce depression and anxiety, communication skills, explore cognitive beliefs and help client to develop healthy cognitive patterns and beliefs.    Objective #B  Patient will identify three distraction and diversion activities and use those activities to decrease level of anxiety  .    Status: Continued - Date(s):  5/9/24    Intervention(s)  Therapist will teach emotional regulation skills. distress tolerance skills, interpersonal effectiveness skills, emotion regluation skills, mindfulness skills, radical acceptance. Therapist will teach client how to ID body cues for anxiety, anxiety reduction techniques, how to ID triggers for depression and anxiety- decrease reactivity/eliminate, lifestyle changes to reduce depression and anxiety, communication skills, explore cognitive beliefs and help client to develop healthy cognitive patterns and beliefs.      Patient has reviewed and agreed to the above plan.      Ricarda  English, [facility]

## 2024-06-14 ENCOUNTER — TELEPHONE (OUTPATIENT)
Dept: GASTROENTEROLOGY | Facility: CLINIC | Age: 49
End: 2024-06-14
Payer: COMMERCIAL

## 2024-06-14 DIAGNOSIS — K59.01 SLOW TRANSIT CONSTIPATION: Primary | ICD-10-CM

## 2024-06-14 NOTE — TELEPHONE ENCOUNTER
PA Initiation    Medication: LINZESS 72 MCG PO CAPS  Insurance Company: [employer] - Phone [phone] Fax [phone]  Pharmacy Filling the Rx:    Filling Pharmacy Phone:    Filling Pharmacy Fax:    Start Date: 6/14/2024  FLAQUITA

## 2024-06-18 NOTE — TELEPHONE ENCOUNTER
Prior Authorization Approval    Medication: LINZESS 72 MCG PO CAPS  Authorization Effective Date: 3/16/2024  Authorization Expiration Date: 6/14/2025  Approved Dose/Quantity: ud  Reference #: [id number]   Insurance Company: [employer] - Phone [phone] Fax [phone]  Expected CoPay: $    CoPay Card Available:      Financial Assistance Needed:    Which Pharmacy is filling the prescription:    Pharmacy Notified:    Patient Notified:

## 2024-06-19 ENCOUNTER — THERAPY VISIT (OUTPATIENT)
Dept: PHYSICAL THERAPY | Facility: CLINIC | Age: 49
End: 2024-06-19
Attending: PREVENTIVE MEDICINE
Payer: COMMERCIAL

## 2024-06-19 DIAGNOSIS — M54.12 CERVICAL RADICULOPATHY: ICD-10-CM

## 2024-06-19 DIAGNOSIS — M54.2 CERVICALGIA: Primary | ICD-10-CM

## 2024-06-19 PROCEDURE — 97140 MANUAL THERAPY 1/> REGIONS: CPT | Mod: GP | Performed by: PHYSICAL THERAPIST

## 2024-06-19 PROCEDURE — 97110 THERAPEUTIC EXERCISES: CPT | Mod: GP | Performed by: PHYSICAL THERAPIST

## 2024-06-20 ENCOUNTER — VIRTUAL VISIT (OUTPATIENT)
Dept: PSYCHOLOGY | Facility: CLINIC | Age: 49
End: 2024-06-20
Payer: COMMERCIAL

## 2024-06-20 DIAGNOSIS — F41.1 GENERALIZED ANXIETY DISORDER: ICD-10-CM

## 2024-06-20 DIAGNOSIS — F33.1 MODERATE EPISODE OF RECURRENT MAJOR DEPRESSIVE DISORDER (H): Primary | ICD-10-CM

## 2024-06-20 PROCEDURE — 90837 PSYTX W PT 60 MINUTES: CPT | Mod: 95 | Performed by: COUNSELOR

## 2024-06-20 NOTE — PROGRESS NOTES
Answers submitted by the patient for this visit:  Patient Health Questionnaire (Submitted on 6/20/2024)  If you checked off any problems, how difficult have these problems made it for you to do your work, take care of things at home, or get along with other people?: Extremely difficult  PHQ9 TOTAL SCORE: 22        [facility]                                     Progress Note    Patient Name: Radha Beckwith  Date: 6/20/24         Service Type: Individual      Session Start Time: 2:00pm Session End Time: 3:00pm     Session Length:  54    Session #: 41    Attendees: Client    Service Modality:  Video Visit:      Provider verified identity through the following two step process.  Patient provided:  Patient is known previously to provider    Telemedicine Visit: The patient's condition can be safely assessed and treated via synchronous audio and visual telemedicine encounter.      Reason for Telemedicine Visit: Patient convenience (e.g. access to timely appointments / distance to available provider)    Originating Site (Patient Location): Patient's home    Distant Site (Provider Location): Provider Remote Setting- Home Office    Consent:  The patient/guardian has verbally consented to: the potential risks and benefits of telemedicine (video visit) versus in person care; bill my insurance or make self-payment for services provided; and responsibility for payment of non-covered services.     Patient would like the video invitation sent by:  My Chart    Mode of Communication:  Video Conference via Amwell    Distant Location (Provider):  On-site    As the provider I attest to compliance with applicable laws and regulations related to telemedicine.    DATA  Interactive Complexity: No  Crisis: No        Progress Since Last Session (Related to Symptoms / Goals / Homework):   Symptoms: No change .    Homework: Completed in session      Episode of Care Goals: Satisfactory progress - MAINTENANCE (Working to  maintain change, with risk of relapse); Intervened by continuing to positively reinforce healthy behavior choice      Current / Ongoing Stressors and Concerns:   Client is trying to find new car insurance.   Got her social security/disability denial on Monday. Going to appeal this decision. Trying to get some assistance now through the [employer] for financial assistance for being behind on rent, bills, utilities.      Treatment Objective(s) Addressed in This Session:   Increase interest, engagement, and pleasure in doing things  Feel less tired and more energy during the day        Intervention:   Talked about the social security denial and how she's been working through that. Talked about some positive moments she's gotten to have with her boyfriend too that really made her happy. Talked through the financial assistance things she's needing right now and how much stress this is for her.           Assessments completed prior to visit:  The following assessments were completed by patient for this visit:  PHQ9:       4/22/2024     3:31 PM 5/2/2024     1:10 PM 5/9/2024    12:29 PM 5/14/2024     2:05 PM 5/23/2024     5:46 AM 6/12/2024     4:05 PM 6/20/2024    12:32 AM   PHQ-9 SCORE   PHQ-9 Total Score MyChart 24 (Severe depression) 24 (Severe depression) 24 (Severe depression) 24 (Severe depression) 23 (Severe depression) 21 (Severe depression) 22 (Severe depression)   PHQ-9 Total Score 24 24 24 24 23 21 22     GAD7:       2/19/2024     3:39 PM 3/12/2024     8:32 PM 3/27/2024     8:51 PM 4/17/2024     1:23 PM 5/2/2024     1:11 PM 5/23/2024     5:47 AM 6/12/2024     4:08 PM   BO-7 SCORE   Total Score 19 (severe anxiety) 20 (severe anxiety) 19 (severe anxiety) 21 (severe anxiety) 21 (severe anxiety) 21 (severe anxiety) 21 (severe anxiety)   Total Score 19 20 19 21    21 21    21 21 21     PROMIS 10-Global Health (all questions and answers displayed):       11/26/2023    10:43 PM 2/27/2024     6:40 PM  3/12/2024     8:35 PM 3/27/2024     8:53 PM 4/2/2024     4:38 PM 4/10/2024     1:58 PM 4/17/2024     1:28 PM   PROMIS 10   In general, would you say your health is: Fair Fair Poor Poor Poor Poor Poor   In general, would you say your quality of life is: Poor Fair Poor Poor Poor Poor Poor   In general, how would you rate your physical health? Poor Poor Poor Poor Poor Poor Poor   In general, how would you rate your mental health, including your mood and your ability to think? Poor Fair Poor Poor Poor Poor Poor   In general, how would you rate your satisfaction with your social activities and relationships? Poor Poor Poor Poor Poor Poor Poor   In general, please rate how well you carry out your usual social activities and roles Fair Poor Poor Poor Poor Fair Poor   To what extent are you able to carry out your everyday physical activities such as walking, climbing stairs, carrying groceries, or moving a chair? A little A little A little A little A little A little A little   In the past 7 days, how often have you been bothered by emotional problems such as feeling anxious, depressed, or irritable? Always Always Always Always Always Always Always   In the past 7 days, how would you rate your fatigue on average? Severe Severe Severe Severe Severe Severe Very severe   In the past 7 days, how would you rate your pain on average, where 0 means no pain, and 10 means worst imaginable pain? 7 7 8 7 7 7 7   In general, would you say your health is: 2 2 1 1 1 1 1   In general, would you say your quality of life is: 1 2 1 1 1 1 1   In general, how would you rate your physical health? 1 1 1 1 1 1 1   In general, how would you rate your mental health, including your mood and your ability to think? 1 2 1 1 1 1 1   In general, how would you rate your satisfaction with your social activities and relationships? 1 1 1 1 1 1 1   In general, please rate how well you carry out your usual social activities and roles. (This includes activities  at home, at work and in your community, and responsibilities as a parent, child, spouse, employee, friend, etc.) 2 1 1 1 1 2 1   To what extent are you able to carry out your everyday physical activities such as walking, climbing stairs, carrying groceries, or moving a chair? 2 2 2 2 2 2 2   In the past 7 days, how often have you been bothered by emotional problems such as feeling anxious, depressed, or irritable? 5 5 5 5 5 5 5   In the past 7 days, how would you rate your fatigue on average? 4 4 4 4 4 4 5   In the past 7 days, how would you rate your pain on average, where 0 means no pain, and 10 means worst imaginable pain? 7 7 8 7 7 7 7   Global Mental Health Score 4 6 4 4 4 4    4 4    4   Global Physical Health Score 7 7 7 7 7 7    7 6    6   PROMIS TOTAL - SUBSCORES 11 13 11 11 11 11    11 10    10     [place] Suicide Severity Rating Scale (Lifetime/Recent)      10/4/2022    11:00 AM 5/13/2024     3:13 PM   [place] Suicide Severity Rating (Lifetime/Recent)   Q1 Wished to be Dead (Past Month) no 0-->no   Q2 Suicidal Thoughts (Past Month) no 0-->no   Q3 Suicidal Thought Method no    Q4 Suicidal Intent without Specific Plan no    Q5 Suicide Intent with Specific Plan yes    Q6 Suicide Behavior (Lifetime) yes 0-->no   If yes to Q6, within past 3 months? no    Level of Risk per Screen high risk no risks indicated         ASSESSMENT: Current Emotional / Mental Status (status of significant symptoms):   Risk status (Self / Other harm or suicidal ideation)   Patient denies current fears or concerns for personal safety.   Patient denies current or recent suicidal ideation or behaviors.   Patient denies current or recent homicidal ideation or behaviors.   Patient denies current or recent self injurious behavior or ideation.   Patient denies other safety concerns.   Patient reports there has been no change in risk factors since their last session.     Patient reports there has been no change in protective factors since  their last session.     Recommended that patient call 911 or go to the local ED should there be a change in any of these risk factors.     Appearance:   Appropriate    Eye Contact:   Good    Psychomotor Behavior: Normal    Attitude:   Cooperative    Orientation:   All   Speech    Rate / Production: Normal/ Responsive Normal     Volume:  Normal    Mood:    Depressed  Normal   Affect:    Appropriate    Thought Content:  Clear  Rumination    Thought Form:  Coherent    Insight:    Good      Medication Review:   No changes to current psychiatric medication(s)     Medication Compliance:   Yes     Changes in Health Issues:   None reported     Chemical Use Review:   Substance Use: Chemical use reviewed, no active concerns identified      Tobacco Use: No change in amount of tobacco use since last session.  Patient declined discussion at this time    Diagnosis:  1. Moderate episode of recurrent major depressive disorder (H)    2. Generalized anxiety disorder          Collateral Reports Completed:   Not Applicable    PLAN: (Patient Tasks / Therapist Tasks / Other)  Continue to continue to work on self care/self boundaries and being effective.         Ricarda Bhatia, [facility]                                                         ______________________________________________________________________    Individual Treatment Plan    Patient's Name: Radha Beckwith  YOB: 1975    Date of Creation: 6/28/23  Date Treatment Plan Last Reviewed/Revised: 5/9/24    DSM5 Diagnoses: 296.32 (F33.1) Major Depressive Disorder, Recurrent Episode, Moderate _  Psychosocial / Contextual Factors: living with boyfriend, memory issues  PROMIS (reviewed every 90 days):     Referral / Collaboration:  Referral to another professional/service is not indicated at this time..    Anticipated number of session for this episode of care: 9-12 sessions  Anticipation frequency of session:  as needed  Anticipated Duration of each session: 38-52  minutes  Treatment plan will be reviewed in 90 days or when goals have been changed.       MeasurableTreatment Goal(s) related to diagnosis / functional impairment(s)  Goal 1: Patient will increase communication skills with family members.    Objective #A (Patient Action)    Patient will learn & utilize at least 2 assertive communication skills weekly.  Status: Continued - Date(s): 5/9/24    Intervention(s)  Therapist will teach emotional regulation skills. distress tolerance skills, interpersonal effectiveness skills, emotion regluation skills, mindfulness skills, radical acceptance. Therapist will teach client how to ID body cues for anxiety, anxiety reduction techniques, how to ID triggers for depression and anxiety- decrease reactivity/eliminate, lifestyle changes to reduce depression and anxiety, communication skills, explore cognitive beliefs and help client to develop healthy cognitive patterns and beliefs.    Objective #B  Patient will use thought-stopping strategy daily to reduce intrusive thoughts.  Status: Continued - Date(s): 5/9/24    Intervention(s)  Therapist will teach emotional regulation skills. distress tolerance skills, interpersonal effectiveness skills, emotion regluation skills, mindfulness skills, radical acceptance. Therapist will teach client how to ID body cues for anxiety, anxiety reduction techniques, how to ID triggers for depression and anxiety- decrease reactivity/eliminate, lifestyle changes to reduce depression and anxiety, communication skills, explore cognitive beliefs and help client to develop healthy cognitive patterns and beliefs.      Goal 2: Patient will decrease depression symptoms.      Objective #A (Patient Action)    Status: Continued - Date(s): 5/9/24    Patient will Increase interest, engagement, and pleasure in doing things  Decrease frequency and intensity of feeling down, depressed, hopeless  Improve quantity and quality of night time sleep / decrease daytime  naps  Feel less tired and more energy during the day   Improve diet, appetite, mindful eating, and / or meal planning  Identify negative self-talk and behaviors: challenge core beliefs, myths, and actions  Improve concentration, focus, and mindfulness in daily activities   Feel less fidgety, restless or slow in daily activities / interpersonal interactions.    Intervention(s)  Therapist will teach emotional regulation skills. distress tolerance skills, interpersonal effectiveness skills, emotion regluation skills, mindfulness skills, radical acceptance. Therapist will teach client how to ID body cues for anxiety, anxiety reduction techniques, how to ID triggers for depression and anxiety- decrease reactivity/eliminate, lifestyle changes to reduce depression and anxiety, communication skills, explore cognitive beliefs and help client to develop healthy cognitive patterns and beliefs.    Objective #B  Patient will identify three distraction and diversion activities and use those activities to decrease level of anxiety  .    Status: Continued - Date(s):  5/9/24    Intervention(s)  Therapist will teach emotional regulation skills. distress tolerance skills, interpersonal effectiveness skills, emotion regluation skills, mindfulness skills, radical acceptance. Therapist will teach client how to ID body cues for anxiety, anxiety reduction techniques, how to ID triggers for depression and anxiety- decrease reactivity/eliminate, lifestyle changes to reduce depression and anxiety, communication skills, explore cognitive beliefs and help client to develop healthy cognitive patterns and beliefs.      Patient has reviewed and agreed to the above plan.      Ricarda Bhatia [facility]

## 2024-06-21 ENCOUNTER — VIRTUAL VISIT (OUTPATIENT)
Dept: PALLIATIVE MEDICINE | Facility: CLINIC | Age: 49
End: 2024-06-21
Payer: COMMERCIAL

## 2024-06-21 DIAGNOSIS — M54.41 CHRONIC BILATERAL LOW BACK PAIN WITH BILATERAL SCIATICA: ICD-10-CM

## 2024-06-21 DIAGNOSIS — M47.812 ARTHROPATHY OF CERVICAL FACET JOINT: Primary | ICD-10-CM

## 2024-06-21 DIAGNOSIS — G89.29 CHRONIC BILATERAL THORACIC BACK PAIN: ICD-10-CM

## 2024-06-21 DIAGNOSIS — M79.18 MYOFASCIAL PAIN: ICD-10-CM

## 2024-06-21 DIAGNOSIS — M79.642 BILATERAL HAND PAIN: ICD-10-CM

## 2024-06-21 DIAGNOSIS — G89.29 CHRONIC BILATERAL LOW BACK PAIN WITH BILATERAL SCIATICA: ICD-10-CM

## 2024-06-21 DIAGNOSIS — M54.2 CHRONIC NECK PAIN: ICD-10-CM

## 2024-06-21 DIAGNOSIS — G89.29 CHRONIC NECK PAIN: ICD-10-CM

## 2024-06-21 DIAGNOSIS — M79.641 BILATERAL HAND PAIN: ICD-10-CM

## 2024-06-21 DIAGNOSIS — M54.6 CHRONIC BILATERAL THORACIC BACK PAIN: ICD-10-CM

## 2024-06-21 DIAGNOSIS — M54.42 CHRONIC BILATERAL LOW BACK PAIN WITH BILATERAL SCIATICA: ICD-10-CM

## 2024-06-21 PROCEDURE — 96158 HLTH BHV IVNTJ INDIV 1ST 30: CPT | Mod: 95 | Performed by: PSYCHOLOGIST

## 2024-06-21 PROCEDURE — 96159 HLTH BHV IVNTJ INDIV EA ADDL: CPT | Mod: 95 | Performed by: PSYCHOLOGIST

## 2024-06-21 NOTE — PROGRESS NOTES
Radha Beckwith is a 48 year old female who is being evaluated via a billable video visit.      Patient is currently in the [facility]? yes    Patient would like the video invitation sent by: Text to cell phone: [phone]}    Video Start Time: 11:00 AM  Video Stop Time: 11:55 AM    Additional provider notes:     Pain Diagnoses per pain provider:   Arthropathy of cervical facet joint     Chronic neck pain     Myofascial pain     Chronic bilateral thoracic back pain     Chronic bilateral low back pain with bilateral sciatica     Bilateral hand pain        DATA: During today's visit you reported the following: Your neck, back and hand pain is unchanged. Your mood is mildly worse - report some concerns about son and boyfriend, report increased depressive symptoms as a result. Your activity level is mildly increased. Your stress level is mildly increased. Your sleep is still poor.    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 Seems interested in exploring Spoon Theory both for herself and to use it  for self-advocacy.     PLAN:   Your next appointment is scheduled for 7/16 at 11:00 AM.  Assignment/Objectives /interventions for next session:   - download Keep[employer] Alexi on your phone    We believe regular attendance is key to your success in our program!    Any time you are unable to keep your appointment we ask that you call us at [phone] at least 24 hours in advance to cancel.This will allow us to offer the appointment time to another patient.   Multiple missed appointments may lead to dismissal from the clinic.    Telemedicine Visit: The patient's condition can be safely assessed and treated via synchronous audio and visual telemedicine encounter.      Reason for Telemedicine Visit: Services only offered telehealth    Originating Site (Patient Location): Patient's home    Distant Site (Provider Location): Provider Remote Setting- Home Office    Consent:  The patient/guardian has verbally consented to: the potential risks and benefits of telemedicine (video visit) versus in person care; bill my insurance or make self-payment for services provided; and responsibility for payment of non-covered services.     Mode of Communication:  Video Conference via HealthLoop    As the provider I attest to compliance with applicable laws and regulations related to telemedicine.      Karen Kohler PsyD LP  Licensed Psychologist  Outpatient Clinic Therapist  [facility]

## 2024-06-26 NOTE — TELEPHONE ENCOUNTER
"MA notified nursing staff that patient would like to discuss possibility of anxiolytic medication prior to injection scheduled for tomorrow.     \"The last time I went to the [facility] in [place] for just the neck shots my anxiety or I don't know what it was... I was breathing so fast I almost hyperventilated... I don't know why but my anxiety level when through the roof.\"     Patient identifies that she had taken oral Xanax prior to that injection. Patient states she was not given any medications during that procedure.   No complications noted in Dr. Ivory's note from 4/19/2024 procedure.     Discussed placing IV tomorrow which patient is agreeable to and understands that she will need to arrive earlier for.     Routing to provider as FYI / to order IV medication if appropriate.     Reema Gaffney RN    "

## 2024-06-27 ENCOUNTER — RADIOLOGY INJECTION OFFICE VISIT (OUTPATIENT)
Dept: PALLIATIVE MEDICINE | Facility: CLINIC | Age: 49
End: 2024-06-27
Payer: COMMERCIAL

## 2024-06-27 VITALS — SYSTOLIC BLOOD PRESSURE: 110 MMHG | DIASTOLIC BLOOD PRESSURE: 80 MMHG | HEART RATE: 86 BPM | OXYGEN SATURATION: 95 %

## 2024-06-27 DIAGNOSIS — M47.812 SPONDYLOSIS OF CERVICAL REGION WITHOUT MYELOPATHY OR RADICULOPATHY: ICD-10-CM

## 2024-06-27 DIAGNOSIS — M47.812 ARTHROPATHY OF CERVICAL FACET JOINT: ICD-10-CM

## 2024-06-27 PROCEDURE — 64491 INJ PARAVERT F JNT C/T 2 LEV: CPT | Mod: 50 | Performed by: PAIN MEDICINE

## 2024-06-27 PROCEDURE — 64490 INJ PARAVERT F JNT C/T 1 LEV: CPT | Mod: 50 | Performed by: PAIN MEDICINE

## 2024-06-27 ASSESSMENT — PAIN SCALES - GENERAL
PAINLEVEL: SEVERE PAIN (6)
PAINLEVEL: MODERATE PAIN (4)

## 2024-06-27 NOTE — PATIENT INSTRUCTIONS
[facility]   Medial Branch Block Discharge Instructions      Your procedure was performed by: Dr. King Reynaga       Medications used include:  Lidocaine  Bupivicaine  Omnipaque  Omniscan  Ropivicaine Normal saline     You will need to complete the Pain Scale Log form and return it to us as soon as possible.  Once we have received the form, we will review it and call you to determine the next steps.     The form can be faxed to [phone] or mailed to:   [facility]   [id number]   [place] [address]    If you have received sedation before, during, or after your procedure, for the next 24 hours you shall NOT:  -Drive -Operate machinery -Drink alcohol -Sign any legal documents      You may resume your regular medications  You may resume your regular activities  Be cautious with activities as numbness and/or weakness in the upper extremities may occur for up to 6-8 hours due to the effects of the anesthetic.  Avoid driving for 6 hours. The local anesthetic could slow your reflexes.   You may shower, however no swimming or tub baths or hot tubs for 24 hours following your procedure.  Your pain will return after the numbing medications have worn off.  You may use your current pain medications as needed.  Unless you have been directed to avoid the use of anti-inflammatory medications (NSAIDS), you may use medications such as ibuprofen, Aleve or Tylenol for pain control if needed.  Some people find it helpful to alternate ibuprofen and Tylenol every 3 hours for a couple of days.  You may use ice packs 10-15 minutes three to four times a day at the injection site for comfort.   Do not use heat to painful areas for 6 to 8 hours. This will give the local anesthetic time to wear off and prevent you from accidentally burning your skin.   If you experience any of the following, call the Pain Clinic during work hours (Monday through Friday 8 am-4:30 pm) at  [phone] or the Provider Line after hours at [phone]:  -Fever over 100 degree F  -Swelling, bleeding, redness, drainage, warmth at the injection site  -Progressive weakness or numbness in your arms  -Unusual headache that is not relieved by Tylenol or other pain reliever  -Unusual new onset of pain that is not improving

## 2024-06-27 NOTE — NURSING NOTE
Discharge Information    IV Discontiued Time:  1600    Amount of Fluid Infused:  NA    Discharge Criteria = When patient returns to baseline or as per MD order    Consciousness:  Pt is fully awake    Circulation:  BP +/- 20% of pre-procedure level    Respiration:  Patient is able to breathe deeply    O2 Sat:  Patient is able to maintain O2 Sat >92% on room air    Activity:  Moves 4 extremities on command    Ambulation:  Patient is able to stand and walk or stand and pivot into wheelchair    Dressing:  Clean/dry     Notes:   Discharge instructions and AVS given to patient    Patient meets criteria for discharge?  YES    Admitted to PCU?  No    Responsible adult present to accompany patient home?  Yes    Signature/Title:    Reema Gaffney RN  RN Care Coordinator  [facility]

## 2024-06-27 NOTE — NURSING NOTE
Pre-procedure Intake  If YES to any questions or NO to having a   Please complete laminated checklist and leave on the computer keyboard for Provider, verbally inform provider if able.    For SCS Trial, RFA's or any sedation procedure:  Have you been fasting? NA  If yes, for how long?     Are you taking any any blood thinners such as Coumadin, Warfarin, Jantoven, Pradaxa Xarelto, Eliquis, Edoxaban, Enoxaparin, Lovenox, Heparin, Arixtra, Fondaparinux, or Fragmin? OR Antiplatelet medication such as Plavix, Brilinta, or Effient?   No   If yes, when did you take your last dose?     Do you take aspirin?  No  If cervical procedure, have you held aspirin for 6 days?       Do you have any allergies to contrast dye, iodine, steroid and/or numbing medications?  NO    Are you currently taking antibiotics or have an active infection?  NO    Have you had a fever/elevated temperature within the past week? NO    Are you currently taking oral steroids? NO    Do you have a ? Yes    Are you pregnant or breastfeeding?  NO    Have you received the COVID-19 vaccine? Yes   If yes, was it your 1st, 2nd or only dose needed?  Date of most recent vaccine:2/21/22    Notify provider and RNs if systolic BP >170, diastolic BP >100, P >100 or O2 sats < 90%

## 2024-07-01 ASSESSMENT — ANXIETY QUESTIONNAIRES
3. WORRYING TOO MUCH ABOUT DIFFERENT THINGS: NEARLY EVERY DAY
4. TROUBLE RELAXING: MORE THAN HALF THE DAYS
1. FEELING NERVOUS, ANXIOUS, OR ON EDGE: NEARLY EVERY DAY
8. IF YOU CHECKED OFF ANY PROBLEMS, HOW DIFFICULT HAVE THESE MADE IT FOR YOU TO DO YOUR WORK, TAKE CARE OF THINGS AT HOME, OR GET ALONG WITH OTHER PEOPLE?: EXTREMELY DIFFICULT
GAD7 TOTAL SCORE: 19
IF YOU CHECKED OFF ANY PROBLEMS ON THIS QUESTIONNAIRE, HOW DIFFICULT HAVE THESE PROBLEMS MADE IT FOR YOU TO DO YOUR WORK, TAKE CARE OF THINGS AT HOME, OR GET ALONG WITH OTHER PEOPLE: EXTREMELY DIFFICULT
5. BEING SO RESTLESS THAT IT IS HARD TO SIT STILL: MORE THAN HALF THE DAYS
2. NOT BEING ABLE TO STOP OR CONTROL WORRYING: NEARLY EVERY DAY
GAD7 TOTAL SCORE: 19
7. FEELING AFRAID AS IF SOMETHING AWFUL MIGHT HAPPEN: NEARLY EVERY DAY
6. BECOMING EASILY ANNOYED OR IRRITABLE: NEARLY EVERY DAY
7. FEELING AFRAID AS IF SOMETHING AWFUL MIGHT HAPPEN: NEARLY EVERY DAY
GAD7 TOTAL SCORE: 19

## 2024-07-03 ENCOUNTER — VIRTUAL VISIT (OUTPATIENT)
Dept: PSYCHOLOGY | Facility: CLINIC | Age: 49
End: 2024-07-03
Payer: COMMERCIAL

## 2024-07-03 DIAGNOSIS — F33.1 MODERATE EPISODE OF RECURRENT MAJOR DEPRESSIVE DISORDER (H): Primary | ICD-10-CM

## 2024-07-03 DIAGNOSIS — F41.1 GENERALIZED ANXIETY DISORDER: ICD-10-CM

## 2024-07-03 PROCEDURE — 90834 PSYTX W PT 45 MINUTES: CPT | Mod: 95 | Performed by: COUNSELOR

## 2024-07-03 NOTE — PROGRESS NOTES
Pre procedure Diagnosis: Cervical spondylosis   Post procedure Diagnosis: Same  Procedure performed: Bilateral cervical medial branch block bilateral c3,4,5  Anesthesia: none  Complications: none  Operators: King Reynaga MD    Indications:   Radha Beckwith is a 48 year old female. The patient has a history of axial neck pain.  Exam shows pain with extension/rotation  and they have tried conservative treatment including meds/pt.    Options/alternatives, benefits and risks were discussed with the patient including but not limited to bleeding, infection, tissue trauma, exposure to radiation, reaction to medications, spinal cord injury, weakness, numbness and paralysis.  Questions were answered to her satisfaction and she wishes to proceed. Voluntary informed consent was obtained and signed.     Vitals were reviewed: Yes  Allergies were reviewed:  Yes   Medications were reviewed:  Yes   Pre-procedure pain score: 6/10    Procedure:  After obtaining signed, informed consent, the patient was taken to the procedure room and placed in the prone position on the [facility] frame.  A Pause for the Cause was completed prior to procedure start.  The patient was prepped and draped in the usual sterile fashion.    The areas of interest were identified with fluoroscopy.  The skin and subcutaneous tissue were anesthetized by injecting Lidocaine 1% 1 ml at each injection site utilizing a 27 gauge 1.25 inch needle.  Then,  25 gauge 3.5 inch spinal needles with slightly curved tips were advanced tangential to the medial branch nerves, abutting the articular pillars at C3,4,5  utilizing AP and Lateral fluoroscopic guidance.  Aspiration for blood and CSF was negative at all the levels.    Then, after repeated negative aspiration, each level was injected with 0.5 ml of 0.25% bupivacaine and the needles were restyletted and withdrawn.    The injection sites were cleaned and sterile dressings were applied where indicated.    The  patient tolerated the procedure well without complications and was taken to the recovery area for continued observation.  Fluoroscopic images were saved to PACS.    The patient was re-evaluated following the procedure and they noted decreased pain and improved range of motion.    Post-procedure pain:  4/10    The patient was given a pain diary and instructed to document their pain throughout the day.  The patient was asked to return the pain diary the next day in person or by fax at which time it will be reviewed and the decision made whether or not to proceed.    Follow-up:  pending results     King Reynaga MD  [facility]

## 2024-07-03 NOTE — PROGRESS NOTES
Answers submitted by the patient for this visit:  Patient Health Questionnaire (Submitted on 7/3/2024)  If you checked off any problems, how difficult have these problems made it for you to do your work, take care of things at home, or get along with other people?: Extremely difficult  PHQ9 TOTAL SCORE: 23  BO-7 (Submitted on 7/1/2024)  BO 7 TOTAL SCORE: 19          [facility]                                     Progress Note    Patient Name: Radha Beckwith  Date: 7/3/24         Service Type: Individual      Session Start Time: 2:00pm Session End Time: 2:50pm     Session Length:  50    Session #: 42    Attendees: Client    Service Modality:  Video Visit:      Provider verified identity through the following two step process.  Patient provided:  Patient is known previously to provider    Telemedicine Visit: The patient's condition can be safely assessed and treated via synchronous audio and visual telemedicine encounter.      Reason for Telemedicine Visit: Patient convenience (e.g. access to timely appointments / distance to available provider)    Originating Site (Patient Location): Patient's home    Distant Site (Provider Location): Provider Remote Setting- Home Office    Consent:  The patient/guardian has verbally consented to: the potential risks and benefits of telemedicine (video visit) versus in person care; bill my insurance or make self-payment for services provided; and responsibility for payment of non-covered services.     Patient would like the video invitation sent by:  My Chart    Mode of Communication:  Video Conference via [facility]    Distant Location (Provider):  On-site    As the provider I attest to compliance with applicable laws and regulations related to telemedicine.    DATA  Interactive Complexity: No  Crisis: No        Progress Since Last Session (Related to Symptoms / Goals / Homework):   Symptoms: No change .    Homework: Completed in session      Episode of Care  Goals: Satisfactory progress - MAINTENANCE (Working to maintain change, with risk of relapse); Intervened by continuing to positively reinforce healthy behavior choice      Current / Ongoing Stressors and Concerns:   Client  stated her son is back in [place] for a visit. He is visiting with his new girlfriend. Stated they talked about his past a little bit and her son believes that he was the reason his biodad left, however, it was her that chose to leave because his biodad wasn't a healthy person.      Treatment Objective(s) Addressed in This Session:   Increase interest, engagement, and pleasure in doing things  Feel less tired and more energy during the day        Intervention:   Processed her thoughts and feelings about her son and those interactions. Discussed how she can communicate with him differently to decrease her own activation and help to decrease defensiveness with her son.          Assessments completed prior to visit:  The following assessments were completed by patient for this visit:  PHQ9:       5/2/2024     1:10 PM 5/9/2024    12:29 PM 5/14/2024     2:05 PM 5/23/2024     5:46 AM 6/12/2024     4:05 PM 6/20/2024    12:32 AM 7/3/2024     1:59 PM   PHQ-9 SCORE   PHQ-9 Total Score MyChart 24 (Severe depression) 24 (Severe depression) 24 (Severe depression) 23 (Severe depression) 21 (Severe depression) 22 (Severe depression) 23 (Severe depression)   PHQ-9 Total Score 24 24 24 23 21 22 23     GAD7:       3/12/2024     8:32 PM 3/27/2024     8:51 PM 4/17/2024     1:23 PM 5/2/2024     1:11 PM 5/23/2024     5:47 AM 6/12/2024     4:08 PM 7/1/2024     2:51 PM   BO-7 SCORE   Total Score 20 (severe anxiety) 19 (severe anxiety) 21 (severe anxiety) 21 (severe anxiety) 21 (severe anxiety) 21 (severe anxiety) 19 (severe anxiety)   Total Score 20 19 21    21 21    21 21 21 19     PROMIS 10-Global Health (all questions and answers displayed):       11/26/2023    10:43 PM 2/27/2024     6:40 PM 3/12/2024     8:35 PM  3/27/2024     8:53 PM 4/2/2024     4:38 PM 4/10/2024     1:58 PM 4/17/2024     1:28 PM   PROMIS 10   In general, would you say your health is: Fair Fair Poor Poor Poor Poor Poor   In general, would you say your quality of life is: Poor Fair Poor Poor Poor Poor Poor   In general, how would you rate your physical health? Poor Poor Poor Poor Poor Poor Poor   In general, how would you rate your mental health, including your mood and your ability to think? Poor Fair Poor Poor Poor Poor Poor   In general, how would you rate your satisfaction with your social activities and relationships? Poor Poor Poor Poor Poor Poor Poor   In general, please rate how well you carry out your usual social activities and roles Fair Poor Poor Poor Poor Fair Poor   To what extent are you able to carry out your everyday physical activities such as walking, climbing stairs, carrying groceries, or moving a chair? A little A little A little A little A little A little A little   In the past 7 days, how often have you been bothered by emotional problems such as feeling anxious, depressed, or irritable? Always Always Always Always Always Always Always   In the past 7 days, how would you rate your fatigue on average? Severe Severe Severe Severe Severe Severe Very severe   In the past 7 days, how would you rate your pain on average, where 0 means no pain, and 10 means worst imaginable pain? 7 7 8 7 7 7 7   In general, would you say your health is: 2 2 1 1 1 1 1   In general, would you say your quality of life is: 1 2 1 1 1 1 1   In general, how would you rate your physical health? 1 1 1 1 1 1 1   In general, how would you rate your mental health, including your mood and your ability to think? 1 2 1 1 1 1 1   In general, how would you rate your satisfaction with your social activities and relationships? 1 1 1 1 1 1 1   In general, please rate how well you carry out your usual social activities and roles. (This includes activities at home, at work and  in your community, and responsibilities as a parent, child, spouse, employee, friend, etc.) 2 1 1 1 1 2 1   To what extent are you able to carry out your everyday physical activities such as walking, climbing stairs, carrying groceries, or moving a chair? 2 2 2 2 2 2 2   In the past 7 days, how often have you been bothered by emotional problems such as feeling anxious, depressed, or irritable? 5 5 5 5 5 5 5   In the past 7 days, how would you rate your fatigue on average? 4 4 4 4 4 4 5   In the past 7 days, how would you rate your pain on average, where 0 means no pain, and 10 means worst imaginable pain? 7 7 8 7 7 7 7   Global Mental Health Score 4 6 4 4 4 4    4 4    4   Global Physical Health Score 7 7 7 7 7 7    7 6    6   PROMIS TOTAL - SUBSCORES 11 13 11 11 11 11    11 10    10     [place] Suicide Severity Rating Scale (Lifetime/Recent)      10/4/2022    11:00 AM 5/13/2024     3:13 PM   [place] Suicide Severity Rating (Lifetime/Recent)   Q1 Wished to be Dead (Past Month) no 0-->no   Q2 Suicidal Thoughts (Past Month) no 0-->no   Q3 Suicidal Thought Method no    Q4 Suicidal Intent without Specific Plan no    Q5 Suicide Intent with Specific Plan yes    Q6 Suicide Behavior (Lifetime) yes 0-->no   If yes to Q6, within past 3 months? no    Level of Risk per Screen high risk no risks indicated         ASSESSMENT: Current Emotional / Mental Status (status of significant symptoms):   Risk status (Self / Other harm or suicidal ideation)   Patient denies current fears or concerns for personal safety.   Patient denies current or recent suicidal ideation or behaviors.   Patient denies current or recent homicidal ideation or behaviors.   Patient denies current or recent self injurious behavior or ideation.   Patient denies other safety concerns.   Patient reports there has been no change in risk factors since their last session.     Patient reports there has been no change in protective factors since their last session.      Recommended that patient call 911 or go to the local ED should there be a change in any of these risk factors.     Appearance:   Appropriate    Eye Contact:   Good    Psychomotor Behavior: Normal    Attitude:   Cooperative    Orientation:   All   Speech    Rate / Production: Normal/ Responsive Normal     Volume:  Normal    Mood:    Depressed  Normal   Affect:    Appropriate    Thought Content:  Clear  Rumination    Thought Form:  Coherent    Insight:    Good      Medication Review:   No changes to current psychiatric medication(s)     Medication Compliance:   Yes     Changes in Health Issues:   None reported     Chemical Use Review:   Substance Use: Chemical use reviewed, no active concerns identified      Tobacco Use: No change in amount of tobacco use since last session.  Patient declined discussion at this time    Diagnosis:  1. Moderate episode of recurrent major depressive disorder (H)    2. Generalized anxiety disorder          Collateral Reports Completed:   Not Applicable    PLAN: (Patient Tasks / Therapist Tasks / Other)  Continue to continue to work on self care and communication skills.         Ricarda Bhatia, [facility]                                                         ______________________________________________________________________    Individual Treatment Plan    Patient's Name: Radha Beckwith  YOB: 1975    Date of Creation: 6/28/23  Date Treatment Plan Last Reviewed/Revised: 5/9/24    DSM5 Diagnoses: 296.32 (F33.1) Major Depressive Disorder, Recurrent Episode, Moderate _  Psychosocial / Contextual Factors: living with boyfriend, memory issues  PROMIS (reviewed every 90 days):     Referral / Collaboration:  Referral to another professional/service is not indicated at this time..    Anticipated number of session for this episode of care: 9-12 sessions  Anticipation frequency of session:  as needed  Anticipated Duration of each session: 38-52 minutes  Treatment plan will be  reviewed in 90 days or when goals have been changed.       MeasurableTreatment Goal(s) related to diagnosis / functional impairment(s)  Goal 1: Patient will increase communication skills with family members.    Objective #A (Patient Action)    Patient will learn & utilize at least 2 assertive communication skills weekly.  Status: Continued - Date(s): 5/9/24    Intervention(s)  Therapist will teach emotional regulation skills. distress tolerance skills, interpersonal effectiveness skills, emotion regluation skills, mindfulness skills, radical acceptance. Therapist will teach client how to ID body cues for anxiety, anxiety reduction techniques, how to ID triggers for depression and anxiety- decrease reactivity/eliminate, lifestyle changes to reduce depression and anxiety, communication skills, explore cognitive beliefs and help client to develop healthy cognitive patterns and beliefs.    Objective #B  Patient will use thought-stopping strategy daily to reduce intrusive thoughts.  Status: Continued - Date(s): 5/9/24    Intervention(s)  Therapist will teach emotional regulation skills. distress tolerance skills, interpersonal effectiveness skills, emotion regluation skills, mindfulness skills, radical acceptance. Therapist will teach client how to ID body cues for anxiety, anxiety reduction techniques, how to ID triggers for depression and anxiety- decrease reactivity/eliminate, lifestyle changes to reduce depression and anxiety, communication skills, explore cognitive beliefs and help client to develop healthy cognitive patterns and beliefs.      Goal 2: Patient will decrease depression symptoms.      Objective #A (Patient Action)    Status: Continued - Date(s): 5/9/24    Patient will Increase interest, engagement, and pleasure in doing things  Decrease frequency and intensity of feeling down, depressed, hopeless  Improve quantity and quality of night time sleep / decrease daytime naps  Feel less tired and more energy  during the day   Improve diet, appetite, mindful eating, and / or meal planning  Identify negative self-talk and behaviors: challenge core beliefs, myths, and actions  Improve concentration, focus, and mindfulness in daily activities   Feel less fidgety, restless or slow in daily activities / interpersonal interactions.    Intervention(s)  Therapist will teach emotional regulation skills. distress tolerance skills, interpersonal effectiveness skills, emotion regluation skills, mindfulness skills, radical acceptance. Therapist will teach client how to ID body cues for anxiety, anxiety reduction techniques, how to ID triggers for depression and anxiety- decrease reactivity/eliminate, lifestyle changes to reduce depression and anxiety, communication skills, explore cognitive beliefs and help client to develop healthy cognitive patterns and beliefs.    Objective #B  Patient will identify three distraction and diversion activities and use those activities to decrease level of anxiety  .    Status: Continued - Date(s):  5/9/24    Intervention(s)  Therapist will teach emotional regulation skills. distress tolerance skills, interpersonal effectiveness skills, emotion regluation skills, mindfulness skills, radical acceptance. Therapist will teach client how to ID body cues for anxiety, anxiety reduction techniques, how to ID triggers for depression and anxiety- decrease reactivity/eliminate, lifestyle changes to reduce depression and anxiety, communication skills, explore cognitive beliefs and help client to develop healthy cognitive patterns and beliefs.      Patient has reviewed and agreed to the above plan.      Ricarda Bhatia [facility]

## 2024-07-11 ENCOUNTER — VIRTUAL VISIT (OUTPATIENT)
Dept: PSYCHOLOGY | Facility: CLINIC | Age: 49
End: 2024-07-11
Payer: COMMERCIAL

## 2024-07-11 DIAGNOSIS — F33.1 MODERATE EPISODE OF RECURRENT MAJOR DEPRESSIVE DISORDER (H): Primary | ICD-10-CM

## 2024-07-11 DIAGNOSIS — F41.1 GENERALIZED ANXIETY DISORDER: ICD-10-CM

## 2024-07-11 PROCEDURE — 90834 PSYTX W PT 45 MINUTES: CPT | Mod: 95 | Performed by: COUNSELOR

## 2024-07-11 NOTE — PROGRESS NOTES
Answers submitted by the patient for this visit:  Patient Health Questionnaire (Submitted on 7/11/2024)  If you checked off any problems, how difficult have these problems made it for you to do your work, take care of things at home, or get along with other people?: Extremely difficult  PHQ9 TOTAL SCORE: 22        [facility]                                     Progress Note    Patient Name: Radha Beckwith  Date: 7/11/24         Service Type: Individual      Session Start Time: 2:00pm Session End Time: 2:50pm     Session Length:  50    Session #: 43    Attendees: Client    Service Modality:  Video Visit:      Provider verified identity through the following two step process.  Patient provided:  Patient is known previously to provider    Telemedicine Visit: The patient's condition can be safely assessed and treated via synchronous audio and visual telemedicine encounter.      Reason for Telemedicine Visit: Patient convenience (e.g. access to timely appointments / distance to available provider)    Originating Site (Patient Location): Patient's home    Distant Site (Provider Location): Provider Remote Setting- Home Office    Consent:  The patient/guardian has verbally consented to: the potential risks and benefits of telemedicine (video visit) versus in person care; bill my insurance or make self-payment for services provided; and responsibility for payment of non-covered services.     Patient would like the video invitation sent by:  My Chart    Mode of Communication:  Video Conference via [employer]    Distant Location (Provider):  On-site    As the provider I attest to compliance with applicable laws and regulations related to telemedicine.    DATA  Interactive Complexity: No  Crisis: No        Progress Since Last Session (Related to Symptoms / Goals / Homework):   Symptoms: No change .    Homework: Completed in session      Episode of Care Goals: Satisfactory progress - MAINTENANCE (Working to  "maintain change, with risk of relapse); Intervened by continuing to positively reinforce healthy behavior choice      Current / Ongoing Stressors and Concerns:   Client  stated her son went back to [place]. She only got to spend about 12 hours with him while he was here for 9 days.      Treatment Objective(s) Addressed in This Session:   Increase interest, engagement, and pleasure in doing things  Feel less tired and more energy during the day        Intervention:   Talked about how the visit with her son and his girlfriend went. Stated she worked hard to filter what she said to him and tried not to make him or her upset. Also stated that her boyfriend said that he wanted \"[our]\" house back and wanted to get her son's stuff out of their house.         Assessments completed prior to visit:  The following assessments were completed by patient for this visit:  PHQ9:       5/9/2024    12:29 PM 5/14/2024     2:05 PM 5/23/2024     5:46 AM 6/12/2024     4:05 PM 6/20/2024    12:32 AM 7/3/2024     1:59 PM 7/11/2024     9:40 AM   PHQ-9 SCORE   PHQ-9 Total Score MyChart 24 (Severe depression) 24 (Severe depression) 23 (Severe depression) 21 (Severe depression) 22 (Severe depression) 23 (Severe depression) 22 (Severe depression)   PHQ-9 Total Score 24 24 23 21 22 23 22     GAD7:       3/12/2024     8:32 PM 3/27/2024     8:51 PM 4/17/2024     1:23 PM 5/2/2024     1:11 PM 5/23/2024     5:47 AM 6/12/2024     4:08 PM 7/1/2024     2:51 PM   BO-7 SCORE   Total Score 20 (severe anxiety) 19 (severe anxiety) 21 (severe anxiety) 21 (severe anxiety) 21 (severe anxiety) 21 (severe anxiety) 19 (severe anxiety)   Total Score 20 19 21    21 21    21 21 21 19     PROMIS 10-Global Health (all questions and answers displayed):       11/26/2023    10:43 PM 2/27/2024     6:40 PM 3/12/2024     8:35 PM 3/27/2024     8:53 PM 4/2/2024     4:38 PM 4/10/2024     1:58 PM 4/17/2024     1:28 PM   PROMIS 10   In general, would you say your health is: Fair Fair " Poor Poor Poor Poor Poor   In general, would you say your quality of life is: Poor Fair Poor Poor Poor Poor Poor   In general, how would you rate your physical health? Poor Poor Poor Poor Poor Poor Poor   In general, how would you rate your mental health, including your mood and your ability to think? Poor Fair Poor Poor Poor Poor Poor   In general, how would you rate your satisfaction with your social activities and relationships? Poor Poor Poor Poor Poor Poor Poor   In general, please rate how well you carry out your usual social activities and roles Fair Poor Poor Poor Poor Fair Poor   To what extent are you able to carry out your everyday physical activities such as walking, climbing stairs, carrying groceries, or moving a chair? A little A little A little A little A little A little A little   In the past 7 days, how often have you been bothered by emotional problems such as feeling anxious, depressed, or irritable? Always Always Always Always Always Always Always   In the past 7 days, how would you rate your fatigue on average? Severe Severe Severe Severe Severe Severe Very severe   In the past 7 days, how would you rate your pain on average, where 0 means no pain, and 10 means worst imaginable pain? 7 7 8 7 7 7 7   In general, would you say your health is: 2 2 1 1 1 1 1   In general, would you say your quality of life is: 1 2 1 1 1 1 1   In general, how would you rate your physical health? 1 1 1 1 1 1 1   In general, how would you rate your mental health, including your mood and your ability to think? 1 2 1 1 1 1 1   In general, how would you rate your satisfaction with your social activities and relationships? 1 1 1 1 1 1 1   In general, please rate how well you carry out your usual social activities and roles. (This includes activities at home, at work and in your community, and responsibilities as a parent, child, spouse, employee, friend, etc.) 2 1 1 1 1 2 1   To what extent are you able to carry out your  everyday physical activities such as walking, climbing stairs, carrying groceries, or moving a chair? 2 2 2 2 2 2 2   In the past 7 days, how often have you been bothered by emotional problems such as feeling anxious, depressed, or irritable? 5 5 5 5 5 5 5   In the past 7 days, how would you rate your fatigue on average? 4 4 4 4 4 4 5   In the past 7 days, how would you rate your pain on average, where 0 means no pain, and 10 means worst imaginable pain? 7 7 8 7 7 7 7   Global Mental Health Score 4 6 4 4 4 4    4 4    4   Global Physical Health Score 7 7 7 7 7 7    7 6    6   PROMIS TOTAL - SUBSCORES 11 13 11 11 11 11    11 10    10     [place] Suicide Severity Rating Scale (Lifetime/Recent)      10/4/2022    11:00 AM 5/13/2024     3:13 PM   [place] Suicide Severity Rating (Lifetime/Recent)   Q1 Wished to be Dead (Past Month) no 0-->no   Q2 Suicidal Thoughts (Past Month) no 0-->no   Q3 Suicidal Thought Method no    Q4 Suicidal Intent without Specific Plan no    Q5 Suicide Intent with Specific Plan yes    Q6 Suicide Behavior (Lifetime) yes 0-->no   If yes to Q6, within past 3 months? no    Level of Risk per Screen high risk no risks indicated         ASSESSMENT: Current Emotional / Mental Status (status of significant symptoms):   Risk status (Self / Other harm or suicidal ideation)   Patient denies current fears or concerns for personal safety.   Patient denies current or recent suicidal ideation or behaviors.   Patient denies current or recent homicidal ideation or behaviors.   Patient denies current or recent self injurious behavior or ideation.   Patient denies other safety concerns.   Patient reports there has been no change in risk factors since their last session.     Patient reports there has been no change in protective factors since their last session.     Recommended that patient call 911 or go to the local ED should there be a change in any of these risk factors.     Appearance:   Appropriate    Eye  Contact:   Good    Psychomotor Behavior: Normal    Attitude:   Cooperative    Orientation:   All   Speech    Rate / Production: Normal/ Responsive Normal     Volume:  Normal    Mood:    Depressed  Normal   Affect:    Appropriate    Thought Content:  Clear  Rumination    Thought Form:  Coherent    Insight:    Good      Medication Review:   No changes to current psychiatric medication(s)     Medication Compliance:   Yes     Changes in Health Issues:   None reported     Chemical Use Review:   Substance Use: Chemical use reviewed, no active concerns identified      Tobacco Use: No change in amount of tobacco use since last session.  Patient declined discussion at this time    Diagnosis:  1. Moderate episode of recurrent major depressive disorder (H)    2. Generalized anxiety disorder          Collateral Reports Completed:   Not Applicable    PLAN: (Patient Tasks / Therapist Tasks / Other)  Continue to continue to work on self care and communication skills.         Ricarda Bhatia, [facility]                                                         ______________________________________________________________________    Individual Treatment Plan    Patient's Name: Radha Beckwith  YOB: 1975    Date of Creation: 6/28/23  Date Treatment Plan Last Reviewed/Revised: 5/9/24    DSM5 Diagnoses: 296.32 (F33.1) Major Depressive Disorder, Recurrent Episode, Moderate _  Psychosocial / Contextual Factors: living with boyfriend, memory issues  PROMIS (reviewed every 90 days):     Referral / Collaboration:  Referral to another professional/service is not indicated at this time..    Anticipated number of session for this episode of care: 9-12 sessions  Anticipation frequency of session:  as needed  Anticipated Duration of each session: 38-52 minutes  Treatment plan will be reviewed in 90 days or when goals have been changed.       MeasurableTreatment Goal(s) related to diagnosis / functional impairment(s)  Goal 1: Patient  will increase communication skills with family members.    Objective #A (Patient Action)    Patient will learn & utilize at least 2 assertive communication skills weekly.  Status: Continued - Date(s): 5/9/24    Intervention(s)  Therapist will teach emotional regulation skills. distress tolerance skills, interpersonal effectiveness skills, emotion regluation skills, mindfulness skills, radical acceptance. Therapist will teach client how to ID body cues for anxiety, anxiety reduction techniques, how to ID triggers for depression and anxiety- decrease reactivity/eliminate, lifestyle changes to reduce depression and anxiety, communication skills, explore cognitive beliefs and help client to develop healthy cognitive patterns and beliefs.    Objective #B  Patient will use thought-stopping strategy daily to reduce intrusive thoughts.  Status: Continued - Date(s): 5/9/24    Intervention(s)  Therapist will teach emotional regulation skills. distress tolerance skills, interpersonal effectiveness skills, emotion regluation skills, mindfulness skills, radical acceptance. Therapist will teach client how to ID body cues for anxiety, anxiety reduction techniques, how to ID triggers for depression and anxiety- decrease reactivity/eliminate, lifestyle changes to reduce depression and anxiety, communication skills, explore cognitive beliefs and help client to develop healthy cognitive patterns and beliefs.      Goal 2: Patient will decrease depression symptoms.      Objective #A (Patient Action)    Status: Continued - Date(s): 5/9/24    Patient will Increase interest, engagement, and pleasure in doing things  Decrease frequency and intensity of feeling down, depressed, hopeless  Improve quantity and quality of night time sleep / decrease daytime naps  Feel less tired and more energy during the day   Improve diet, appetite, mindful eating, and / or meal planning  Identify negative self-talk and behaviors: challenge core beliefs,  myths, and actions  Improve concentration, focus, and mindfulness in daily activities   Feel less fidgety, restless or slow in daily activities / interpersonal interactions.    Intervention(s)  Therapist will teach emotional regulation skills. distress tolerance skills, interpersonal effectiveness skills, emotion regluation skills, mindfulness skills, radical acceptance. Therapist will teach client how to ID body cues for anxiety, anxiety reduction techniques, how to ID triggers for depression and anxiety- decrease reactivity/eliminate, lifestyle changes to reduce depression and anxiety, communication skills, explore cognitive beliefs and help client to develop healthy cognitive patterns and beliefs.    Objective #B  Patient will identify three distraction and diversion activities and use those activities to decrease level of anxiety  .    Status: Continued - Date(s):  5/9/24    Intervention(s)  Therapist will teach emotional regulation skills. distress tolerance skills, interpersonal effectiveness skills, emotion regluation skills, mindfulness skills, radical acceptance. Therapist will teach client how to ID body cues for anxiety, anxiety reduction techniques, how to ID triggers for depression and anxiety- decrease reactivity/eliminate, lifestyle changes to reduce depression and anxiety, communication skills, explore cognitive beliefs and help client to develop healthy cognitive patterns and beliefs.      Patient has reviewed and agreed to the above plan.      Ricarda Bhatia [facility]

## 2024-07-16 ENCOUNTER — THERAPY VISIT (OUTPATIENT)
Dept: PHYSICAL THERAPY | Facility: CLINIC | Age: 49
End: 2024-07-16
Attending: PREVENTIVE MEDICINE
Payer: COMMERCIAL

## 2024-07-16 ENCOUNTER — VIRTUAL VISIT (OUTPATIENT)
Dept: PALLIATIVE MEDICINE | Facility: CLINIC | Age: 49
End: 2024-07-16
Payer: COMMERCIAL

## 2024-07-16 DIAGNOSIS — G89.29 CHRONIC BILATERAL LOW BACK PAIN WITH BILATERAL SCIATICA: ICD-10-CM

## 2024-07-16 DIAGNOSIS — G89.29 CHRONIC NECK PAIN: ICD-10-CM

## 2024-07-16 DIAGNOSIS — M54.42 CHRONIC BILATERAL LOW BACK PAIN WITH BILATERAL SCIATICA: ICD-10-CM

## 2024-07-16 DIAGNOSIS — M54.2 CHRONIC NECK PAIN: ICD-10-CM

## 2024-07-16 DIAGNOSIS — M79.18 MYOFASCIAL PAIN: ICD-10-CM

## 2024-07-16 DIAGNOSIS — M79.641 BILATERAL HAND PAIN: ICD-10-CM

## 2024-07-16 DIAGNOSIS — M54.41 CHRONIC BILATERAL LOW BACK PAIN WITH BILATERAL SCIATICA: ICD-10-CM

## 2024-07-16 DIAGNOSIS — M47.812 ARTHROPATHY OF CERVICAL FACET JOINT: Primary | ICD-10-CM

## 2024-07-16 DIAGNOSIS — G89.29 CHRONIC BILATERAL THORACIC BACK PAIN: ICD-10-CM

## 2024-07-16 DIAGNOSIS — M54.2 CERVICALGIA: Primary | ICD-10-CM

## 2024-07-16 DIAGNOSIS — M79.642 BILATERAL HAND PAIN: ICD-10-CM

## 2024-07-16 DIAGNOSIS — M54.6 CHRONIC BILATERAL THORACIC BACK PAIN: ICD-10-CM

## 2024-07-16 PROCEDURE — 96158 HLTH BHV IVNTJ INDIV 1ST 30: CPT | Mod: 95 | Performed by: PSYCHOLOGIST

## 2024-07-16 PROCEDURE — 97110 THERAPEUTIC EXERCISES: CPT | Mod: GP | Performed by: PHYSICAL THERAPIST

## 2024-07-16 PROCEDURE — 96159 HLTH BHV IVNTJ INDIV EA ADDL: CPT | Mod: 95 | Performed by: PSYCHOLOGIST

## 2024-07-16 PROCEDURE — 97530 THERAPEUTIC ACTIVITIES: CPT | Mod: GP | Performed by: PHYSICAL THERAPIST

## 2024-07-16 NOTE — PROGRESS NOTES
Radha Beckwith is a 48 year old female who is being evaluated via a billable video visit.      Patient is currently in the [facility]? yes    Patient would like the video invitation sent by: Text to cell phone: [phone]}    Video Start Time: 11:00 AM  Video Stop Time: 11:55 AM    Additional provider notes:     Pain Diagnoses per pain provider:   Arthropathy of cervical facet joint     Chronic neck pain     Myofascial pain     Chronic bilateral thoracic back pain     Chronic bilateral low back pain with bilateral sciatica     Bilateral hand pain            DATA: During today's visit you reported the following: Your neck pain is worse since injection - encouraged to reach out to clinic to let them know. Back pain is increased - using TENS unit regularly. Hand pain is worse - would like to get injections scheduled. Your mood is worse due to son's visit. Your activity level is unchanged. Your stress level is still higher than you'd like, especially after son's recent visit. Your sleep is mildly improved since starting Temazepam in place of Ambien. Propanolol caused vivid dreams, recently discontinued just 2 nights ago. You reported engaging in self-care for your pain 1-3 times daily.    You identified that you would like to focus on the following or had questions regarding the following issues or concerns, and we discussed the following:   - neck pain feels worse since injection - encouraged to reach out to let clinic know  - son visited while on leave from service - asked his girlfriend a question you regret, feel you are handling it better than you have in the past - addressing in context of individual therapy  - continuing to explore relationship dynamics with son in context of individual therapy  - son did not take his belonging during his visit as expected - he brought his bull riding equipment instead and didn't have room, left abruptly  - feeling overwhelmed by state of house - discussed  importance of continuing to pace yourself  - discussed that provider's role within [facility] is changing, effective 10/9/2024 and how this impacts work together    ASSESSMENT: Linette reports pain is increased in both her hands and neck - discussed reaching out to King Reynaga MD and pain clinic about this right away, as well as provide pain log. She notes increased stress related to son's recent visit, which did not go as well as she had hoped; she seems to be addressing this well in context of individual therapy.     PLAN:   Your next appointment is scheduled for 7/30 at 11:00 AM.  Assignment/Objectives /interventions for next session:   - reach out to clinic about increased pain, also submit pain log from injection  - continue to pace activity    We believe regular attendance is key to your success in our program!    Any time you are unable to keep your appointment we ask that you call us at [phone] at least 24 hours in advance to cancel.This will allow us to offer the appointment time to another patient.   Multiple missed appointments may lead to dismissal from the clinic.    Telemedicine Visit: The patient's condition can be safely assessed and treated via synchronous audio and visual telemedicine encounter.      Reason for Telemedicine Visit: Services only offered telehealth    Originating Site (Patient Location): Patient's home    Distant Site (Provider Location): Provider Remote Setting- Home Office    Consent:  The patient/guardian has verbally consented to: the potential risks and benefits of telemedicine (video visit) versus in person care; bill my insurance or make self-payment for services provided; and responsibility for payment of non-covered services.     Mode of Communication:  Video Conference via [employer]    As the provider I attest to compliance with applicable laws and regulations related to telemedicine.      Karen Kohler PsyD   Licensed Psychologist  Outpatient Clinic  Therapist  [facility]

## 2024-07-18 ENCOUNTER — VIRTUAL VISIT (OUTPATIENT)
Dept: PSYCHOLOGY | Facility: CLINIC | Age: 49
End: 2024-07-18
Payer: COMMERCIAL

## 2024-07-18 DIAGNOSIS — F41.1 GENERALIZED ANXIETY DISORDER: ICD-10-CM

## 2024-07-18 DIAGNOSIS — F33.1 MODERATE EPISODE OF RECURRENT MAJOR DEPRESSIVE DISORDER (H): Primary | ICD-10-CM

## 2024-07-18 PROCEDURE — 90834 PSYTX W PT 45 MINUTES: CPT | Mod: 95 | Performed by: COUNSELOR

## 2024-07-18 NOTE — PROGRESS NOTES
Answers submitted by the patient for this visit:  Patient Health Questionnaire (Submitted on 7/11/2024)  If you checked off any problems, how difficult have these problems made it for you to do your work, take care of things at home, or get along with other people?: Extremely difficult  PHQ9 TOTAL SCORE: 22        [facility]                                     Progress Note    Patient Name: Radha Beckwith  Date: 7/18/24         Service Type: Individual      Session Start Time: 2:10pm Session End Time: 3:00pm     Session Length:  50    Session #: 44    Attendees: Client    Service Modality:  Telephone      Provider verified identity through the following two step process.  Patient provided:  Patient is known previously to provider    Telemedicine Visit: The patient's condition can be safely assessed and treated via synchronous audio and visual telemedicine encounter.      Reason for Telemedicine Visit: Patient convenience (e.g. access to timely appointments / distance to available provider)    Originating Site (Patient Location): Patient's home    Distant Site (Provider Location): Provider Remote Setting- Home Office    Consent:  The patient/guardian has verbally consented to: the potential risks and benefits of telemedicine (video visit) versus in person care; bill my insurance or make self-payment for services provided; and responsibility for payment of non-covered services.     Patient would like the video invitation sent by:  My Chart    Mode of Communication:  telephone    Distant Location (Provider):  On-site    As the provider I attest to compliance with applicable laws and regulations related to telemedicine.    DATA  Interactive Complexity: No  Crisis: No        Progress Since Last Session (Related to Symptoms / Goals / Homework):   Symptoms: Improving .    Homework: Completed in session      Episode of Care Goals: Satisfactory progress - MAINTENANCE (Working to maintain change, with  "risk of relapse); Intervened by continuing to positively reinforce healthy behavior choice      Current / Ongoing Stressors and Concerns:   The session started late due to internet connection issues on both parties. The client also stated she hasn't had internet for 3 days and this is why she was unable to do a video session.   Client stated she had a communication incident recently with there son that happened but she was able to use skills and \"avert a crisis.\"      Treatment Objective(s) Addressed in This Session:   Increase interest, engagement, and pleasure in doing things  Feel less tired and more energy during the day        Intervention:   Talked about the skills she utilized to talk with her son more effectively and to help her decrease her own activation with the text he sent her. Worked on how to not take it personally and continue to communicate effectively in the future. Reinforced her use of the skills! Discussed continued stressors she is facing.          Assessments completed prior to visit:  The following assessments were completed by patient for this visit:  PHQ9:       5/9/2024    12:29 PM 5/14/2024     2:05 PM 5/23/2024     5:46 AM 6/12/2024     4:05 PM 6/20/2024    12:32 AM 7/3/2024     1:59 PM 7/11/2024     9:40 AM   PHQ-9 SCORE   PHQ-9 Total Score MyChart 24 (Severe depression) 24 (Severe depression) 23 (Severe depression) 21 (Severe depression) 22 (Severe depression) 23 (Severe depression) 22 (Severe depression)   PHQ-9 Total Score 24 24 23 21 22 23 22     GAD7:       3/12/2024     8:32 PM 3/27/2024     8:51 PM 4/17/2024     1:23 PM 5/2/2024     1:11 PM 5/23/2024     5:47 AM 6/12/2024     4:08 PM 7/1/2024     2:51 PM   BO-7 SCORE   Total Score 20 (severe anxiety) 19 (severe anxiety) 21 (severe anxiety) 21 (severe anxiety) 21 (severe anxiety) 21 (severe anxiety) 19 (severe anxiety)   Total Score 20 19 21    21 21    21 21 21 19     PROMIS 10-Global Health (all questions and answers " displayed):       11/26/2023    10:43 PM 2/27/2024     6:40 PM 3/12/2024     8:35 PM 3/27/2024     8:53 PM 4/2/2024     4:38 PM 4/10/2024     1:58 PM 4/17/2024     1:28 PM   PROMIS 10   In general, would you say your health is: Fair Fair Poor Poor Poor Poor Poor   In general, would you say your quality of life is: Poor Fair Poor Poor Poor Poor Poor   In general, how would you rate your physical health? Poor Poor Poor Poor Poor Poor Poor   In general, how would you rate your mental health, including your mood and your ability to think? Poor Fair Poor Poor Poor Poor Poor   In general, how would you rate your satisfaction with your social activities and relationships? Poor Poor Poor Poor Poor Poor Poor   In general, please rate how well you carry out your usual social activities and roles Fair Poor Poor Poor Poor Fair Poor   To what extent are you able to carry out your everyday physical activities such as walking, climbing stairs, carrying groceries, or moving a chair? A little A little A little A little A little A little A little   In the past 7 days, how often have you been bothered by emotional problems such as feeling anxious, depressed, or irritable? Always Always Always Always Always Always Always   In the past 7 days, how would you rate your fatigue on average? Severe Severe Severe Severe Severe Severe Very severe   In the past 7 days, how would you rate your pain on average, where 0 means no pain, and 10 means worst imaginable pain? 7 7 8 7 7 7 7   In general, would you say your health is: 2 2 1 1 1 1 1   In general, would you say your quality of life is: 1 2 1 1 1 1 1   In general, how would you rate your physical health? 1 1 1 1 1 1 1   In general, how would you rate your mental health, including your mood and your ability to think? 1 2 1 1 1 1 1   In general, how would you rate your satisfaction with your social activities and relationships? 1 1 1 1 1 1 1   In general, please rate how well you carry out  your usual social activities and roles. (This includes activities at home, at work and in your community, and responsibilities as a parent, child, spouse, employee, friend, etc.) 2 1 1 1 1 2 1   To what extent are you able to carry out your everyday physical activities such as walking, climbing stairs, carrying groceries, or moving a chair? 2 2 2 2 2 2 2   In the past 7 days, how often have you been bothered by emotional problems such as feeling anxious, depressed, or irritable? 5 5 5 5 5 5 5   In the past 7 days, how would you rate your fatigue on average? 4 4 4 4 4 4 5   In the past 7 days, how would you rate your pain on average, where 0 means no pain, and 10 means worst imaginable pain? 7 7 8 7 7 7 7   Global Mental Health Score 4 6 4 4 4 4    4 4    4   Global Physical Health Score 7 7 7 7 7 7    7 6    6   PROMIS TOTAL - SUBSCORES 11 13 11 11 11 11    11 10    10     [place] Suicide Severity Rating Scale (Lifetime/Recent)      10/4/2022    11:00 AM 5/13/2024     3:13 PM   [place] Suicide Severity Rating (Lifetime/Recent)   Q1 Wished to be Dead (Past Month) no 0-->no   Q2 Suicidal Thoughts (Past Month) no 0-->no   Q3 Suicidal Thought Method no    Q4 Suicidal Intent without Specific Plan no    Q5 Suicide Intent with Specific Plan yes    Q6 Suicide Behavior (Lifetime) yes 0-->no   If yes to Q6, within past 3 months? no    Level of Risk per Screen high risk no risks indicated         ASSESSMENT: Current Emotional / Mental Status (status of significant symptoms):   Risk status (Self / Other harm or suicidal ideation)   Patient denies current fears or concerns for personal safety.   Patient denies current or recent suicidal ideation or behaviors.   Patient denies current or recent homicidal ideation or behaviors.   Patient denies current or recent self injurious behavior or ideation.   Patient denies other safety concerns.   Patient reports there has been no change in risk factors since their last session.      Patient reports there has been no change in protective factors since their last session.     Recommended that patient call 911 or go to the local ED should there be a change in any of these risk factors.     Appearance:   Unable to assess due to phone session.     Eye Contact:   Unable to assess due to phone session.    Psychomotor Behavior: Unable to assess due to phone session.    Attitude:   Cooperative    Orientation:   All   Speech    Rate / Production: Normal/ Responsive Normal     Volume:  Normal    Mood:    Normal   Affect:    Unable to assess due to phone session.    Thought Content:  Clear  Rumination    Thought Form:  Coherent    Insight:    Good      Medication Review:   No changes to current psychiatric medication(s)     Medication Compliance:   Yes     Changes in Health Issues:   None reported     Chemical Use Review:   Substance Use: Chemical use reviewed, no active concerns identified      Tobacco Use: No change in amount of tobacco use since last session.  Patient declined discussion at this time    Diagnosis:  1. Moderate episode of recurrent major depressive disorder (H)    2. Generalized anxiety disorder          Collateral Reports Completed:   Not Applicable    PLAN: (Patient Tasks / Therapist Tasks / Other)  Continue to continue to work on self care and communication skills.         Ricarda Bhatia, [facility]                                                         ______________________________________________________________________    Individual Treatment Plan    Patient's Name: Radha Beckwith  YOB: 1975    Date of Creation: 6/28/23  Date Treatment Plan Last Reviewed/Revised: 5/9/24    DSM5 Diagnoses: 296.32 (F33.1) Major Depressive Disorder, Recurrent Episode, Moderate _  Psychosocial / Contextual Factors: living with boyfriend, memory issues  PROMIS (reviewed every 90 days):     Referral / Collaboration:  Referral to another professional/service is not indicated at this  time..    Anticipated number of session for this episode of care: 9-12 sessions  Anticipation frequency of session:  as needed  Anticipated Duration of each session: 38-52 minutes  Treatment plan will be reviewed in 90 days or when goals have been changed.       MeasurableTreatment Goal(s) related to diagnosis / functional impairment(s)  Goal 1: Patient will increase communication skills with family members.    Objective #A (Patient Action)    Patient will learn & utilize at least 2 assertive communication skills weekly.  Status: Continued - Date(s): 5/9/24    Intervention(s)  Therapist will teach emotional regulation skills. distress tolerance skills, interpersonal effectiveness skills, emotion regluation skills, mindfulness skills, radical acceptance. Therapist will teach client how to ID body cues for anxiety, anxiety reduction techniques, how to ID triggers for depression and anxiety- decrease reactivity/eliminate, lifestyle changes to reduce depression and anxiety, communication skills, explore cognitive beliefs and help client to develop healthy cognitive patterns and beliefs.    Objective #B  Patient will use thought-stopping strategy daily to reduce intrusive thoughts.  Status: Continued - Date(s): 5/9/24    Intervention(s)  Therapist will teach emotional regulation skills. distress tolerance skills, interpersonal effectiveness skills, emotion regluation skills, mindfulness skills, radical acceptance. Therapist will teach client how to ID body cues for anxiety, anxiety reduction techniques, how to ID triggers for depression and anxiety- decrease reactivity/eliminate, lifestyle changes to reduce depression and anxiety, communication skills, explore cognitive beliefs and help client to develop healthy cognitive patterns and beliefs.      Goal 2: Patient will decrease depression symptoms.      Objective #A (Patient Action)    Status: Continued - Date(s): 5/9/24    Patient will Increase interest, engagement, and  pleasure in doing things  Decrease frequency and intensity of feeling down, depressed, hopeless  Improve quantity and quality of night time sleep / decrease daytime naps  Feel less tired and more energy during the day   Improve diet, appetite, mindful eating, and / or meal planning  Identify negative self-talk and behaviors: challenge core beliefs, myths, and actions  Improve concentration, focus, and mindfulness in daily activities   Feel less fidgety, restless or slow in daily activities / interpersonal interactions.    Intervention(s)  Therapist will teach emotional regulation skills. distress tolerance skills, interpersonal effectiveness skills, emotion regluation skills, mindfulness skills, radical acceptance. Therapist will teach client how to ID body cues for anxiety, anxiety reduction techniques, how to ID triggers for depression and anxiety- decrease reactivity/eliminate, lifestyle changes to reduce depression and anxiety, communication skills, explore cognitive beliefs and help client to develop healthy cognitive patterns and beliefs.    Objective #B  Patient will identify three distraction and diversion activities and use those activities to decrease level of anxiety  .    Status: Continued - Date(s):  5/9/24    Intervention(s)  Therapist will teach emotional regulation skills. distress tolerance skills, interpersonal effectiveness skills, emotion regluation skills, mindfulness skills, radical acceptance. Therapist will teach client how to ID body cues for anxiety, anxiety reduction techniques, how to ID triggers for depression and anxiety- decrease reactivity/eliminate, lifestyle changes to reduce depression and anxiety, communication skills, explore cognitive beliefs and help client to develop healthy cognitive patterns and beliefs.      Patient has reviewed and agreed to the above plan.      Ricarda Bhatia [facility]

## 2024-07-19 NOTE — PATIENT INSTRUCTIONS
If you have any questions regarding your visit, Please contact your care team.    [facility]: [phone]      [facility] CLINIC HOURS TELEPHONE NUMBER   Clare Carmona DO.    JOSETTE [facility]-Surgery Scheduler  [facility] - Surgery Scheduler    CONG Coyne RN Kylie, RN     Monday, Thursday  [place]  7am-3pm    Tuesday, Wednesday  [place]  7am-3pm    Friday  [place]  1pm-3:30pm    Typical Surgery Days: Thursday or Friday   [facility]  [address] [place] [address] [phone] Phone  [phone] Fax    [facility]  [address] [place] [address] [phone] Phone    Imaging [phone] Phone    [facility] Delivery:  [phone] Phone     **Surgeries** Our Surgery Schedulers will contact you to schedule. If you do not receive a call within 3 business days, please call [phone].    Urgent Care locations:  [facility] Saturday and    9 am - 5 pm    Monday-Friday   12 pm - 8 pm  Saturday and    9 am - 5 pm   [phone]       If you need a medication refill, please contact your pharmacy. Please allow 3 business days for your refill to be completed.  As always, Thank you for trusting us with your healthcare needs!      Breast Pain  Breast pain is one of the most common breast disorders experienced by women of all ages, affecting up to 70% of women in their lifetime. While it can be frightening, less than 3% of these women will have breast cancer.  There are two types of breast pain, cyclical and non cyclical. Cyclical breast pain improved and worsens as a results of the changes in hormone levels. Symptoms often worsen the week before menstruation and improve after the start of a period. Non cyclical breast pain is more common in perimenopausal and  menopausal women. Pain can be constant or come and go, may be present in one or both breast and localized to particular part of the breast.   Chest wall pain is also similar to breast pain and can be easily confused with breast pain. Pain coming from the chest wall beneath the breast tissue is often on one side and can worsen with activity or when pressure is applied. Conditions such as osteoarthritis or chostochondritis can cause this type of pain.    Recommendations for management    Lifestyle  -Lower dietary fat intake to less than 20% of total caloric intake  -If current a moderate-heavy caffeine consumer, eliminate caffeine completely (Chocolate, tea, soda, coffee)    Bra  -Attend a professional bra fitting to ensure correct sizing  -Wear at least one well-fitting, supportive bra at all times, even during sleep    Supplements and medications  -NSAIDs: Oral medications may include naproxen, ibuprofen or tylenol. Creams may include diclofenac. Use according to package  -Flaxseed: 25g of flaxseed (2 tablespoons whole flaxseed) daily may reduce cyclical breast pain  -Evening Primrose Oil: may be used in the short term (6 months) for cyclical breast pain. Start with 500mg/day and increase up to 3000mg/day  -Vitamin E: May be used short term (6 months) for cyclical breast pain. May start 400 international unit(s)/day and increase up to 48023 international unit(s)/day  -Chasteberry: 400-500mg once per day has been shown to resude cyclical breast pain    When to return to clinic  If your breast pain does not improve within the next three months, or you experience a new breast symptom such as a lump or nipple discharge, please call your doctor.

## 2024-07-23 ENCOUNTER — TELEPHONE (OUTPATIENT)
Dept: OBGYN | Facility: CLINIC | Age: 49
End: 2024-07-23

## 2024-07-23 ENCOUNTER — OFFICE VISIT (OUTPATIENT)
Dept: OBGYN | Facility: CLINIC | Age: 49
End: 2024-07-23
Attending: NURSE PRACTITIONER
Payer: COMMERCIAL

## 2024-07-23 VITALS
SYSTOLIC BLOOD PRESSURE: 136 MMHG | HEART RATE: 97 BPM | DIASTOLIC BLOOD PRESSURE: 82 MMHG | BODY MASS INDEX: 29.23 KG/M2 | WEIGHT: 170.3 LBS

## 2024-07-23 DIAGNOSIS — K21.00 GASTROESOPHAGEAL REFLUX DISEASE WITH ESOPHAGITIS WITHOUT HEMORRHAGE: ICD-10-CM

## 2024-07-23 DIAGNOSIS — E78.2 MIXED HYPERLIPIDEMIA: ICD-10-CM

## 2024-07-23 DIAGNOSIS — N64.4 MASTALGIA: ICD-10-CM

## 2024-07-23 DIAGNOSIS — T40.2X5A CONSTIPATION DUE TO OPIOID THERAPY: ICD-10-CM

## 2024-07-23 DIAGNOSIS — R10.2 PELVIC PAIN IN FEMALE: Primary | ICD-10-CM

## 2024-07-23 DIAGNOSIS — K43.2 INCISIONAL HERNIA, WITHOUT OBSTRUCTION OR GANGRENE: ICD-10-CM

## 2024-07-23 DIAGNOSIS — K59.03 CONSTIPATION DUE TO OPIOID THERAPY: ICD-10-CM

## 2024-07-23 PROCEDURE — 99244 OFF/OP CNSLTJ NEW/EST MOD 40: CPT | Performed by: OBSTETRICS & GYNECOLOGY

## 2024-07-23 RX ORDER — PANTOPRAZOLE SODIUM 40 MG/1
40 TABLET, DELAYED RELEASE ORAL 2 TIMES DAILY WITH MEALS
Qty: 60 TABLET | Refills: 5 | Status: SHIPPED | OUTPATIENT
Start: 2024-07-23

## 2024-07-23 RX ORDER — SIMVASTATIN 10 MG
10 TABLET ORAL AT BEDTIME
Qty: 90 TABLET | Refills: 1 | Status: SHIPPED | OUTPATIENT
Start: 2024-07-23

## 2024-07-23 NOTE — Clinical Note
Please call patient to let her know that I had Dr. Dozier look at her images with me and he agrees that she has a hernia. Recommend general surgery evaluation. Referral placed. Clare Carmona, DO

## 2024-07-23 NOTE — TELEPHONE ENCOUNTER
"Clare Carmona DO  P  Ob/Gyn Triage  \"Please call patient to let her know that I had Dr. Dozier look at her images with me and he agrees that she has a hernia. Recommend general surgery evaluation. Referral placed.  Clare Carmona DO\"    Spoke with pt and relayed above note from Dr. Carmona.      Doretha Hernandez RN- OB/GYN group        "

## 2024-07-23 NOTE — PROGRESS NOTES
SUBJECTIVE:       HPI: Radha Beckwith is a 48 year old  who presents today for presents today for consultation regarding pelvic pain, referral from [facility].    Patient c/o lower right abdominal and pelvic pain that radiates to her back. The pain occurs randomly, lasting 20 minutes to up to a day. Nothing makes the pain better. Overactivity makes the pain worse. This has been going on for about 3 years. She is currently taking baclofen, suboxone, cymbalta for chronic pain disorders. No longer on gabapentin due to weight gain concerns. Has tried heating pads, tylenol as well without improvement.  Has never done PFPT. Has recently start Linzess, which has been helping with constipation but not pelvic pain.  Her pain is not cyclic.   Has not been sexually active for two years.  Concerned as well about sensitive breasts, unable to wear a bra. This has been going on for a few years.  She denies vaginal discharge.     History TLH/BS  for cervical dysplasia and pelvic pain.  FINAL DIAGNOSIS:   Uterus with attached bilateral fallopian tubes, laparoscopic total   hysterectomy and bilateral salpingectomy:   - Cervix with focal atypical squamous cells of undetermined   significance.   - Benign nonphasic endometrium, negative for hyperplasia, atypia and   malignancy.   - Attached bilateral fallopian tubes without diagnostic abnormalities.     SPECIMEN(S):  A: Endometrial curettings  B: Cervical biopsy, 9 o'clock  C: Cervical biopsy, 2 o'clock  D: Cervical biopsy, 7 o'clock    FINAL DIAGNOSIS:  A. Cervix, endometrial curettings:  - Cervical glandular and squamous epithelium with no evidence of  dysplasia or malignancy.    B. Cervix, 9 o'clock, biopsy:  - Focal squamous atypia; cannot exclude koilocytosis.  - No evidence of high grade squamous intraepithelial lesion or  malignancy.  - No cervical glandular mucosa seen.    C. Cervix, 2 o'clock, biopsy:  - Squamous mucosa with no evidence of dysplasia  or malignancy.  - No cervical glandular mucosa seen.    D. Cervix, 7 o'clock, biopsy:  - Squamous mucosa with no evidence of dysplasia or malignancy.  - A very small area of cervical glandular mucosa with no diagnostic  alterations.       Ob Hx:  s/p   OB History    Para Term  AB Living   1 1 1 0 0 1   SAB IAB Ectopic Multiple Live Births   0 0 0 0 1      # Outcome Date GA Lbr Samuel/2nd Weight Sex Type Anes PTL Lv   1 Term         SUSANNE    .      Gyn Hx: No LMP recorded (lmp unknown). Patient has had a hysterectomy.     Last pap was 17 lsil +HR Other HPV. No hx high grade dysplasia         reports that she has been smoking cigarettes. She has a 5 pack-year smoking history. She has never been exposed to tobacco smoke. She has never used smokeless tobacco.      Today's PHQ-2 Score:       8/10/2023    12:46 PM   PHQ-2 (  [employer])   Q1: Little interest or pleasure in doing things 3   Q2: Feeling down, depressed or hopeless 3   PHQ-2 Score 6   Q1: Little interest or pleasure in doing things Nearly every day   Q2: Feeling down, depressed or hopeless Nearly every day   PHQ-2 Score 6     Today's PHQ-9 Score:       2024     9:40 AM   PHQ-9 SCORE   PHQ-9 Total Score MyChart 22 (Severe depression)   PHQ-9 Total Score 22     Today's BO-7 Score:       2024     2:51 PM   BO-7 SCORE   Total Score 19 (severe anxiety)   Total Score 19       Problem list and histories reviewed & adjusted, as indicated.  Additional history: as documented.    Patient Active Problem List   Diagnosis    GERD (gastroesophageal reflux disease)    Restless legs    Hyperlipidemia LDL goal <100    Hypokalemia    Tobacco abuse    Anxiety attack    Opioid dependence in remission (H)    Psychophysiological insomnia    Chronic bilateral low back pain without sciatica    Supraventricular tachycardia (H24)    Moderate episode of recurrent major depressive disorder (H)    Chronic low back pain with bilateral sciatica    Chronic  neck pain    Generalized anxiety disorder    Somatic dysfunction of pelvic region    Myofascial pain    Cervical radiculopathy    Chronic bilateral thoracic back pain    Lumbar radiculopathy    Pain of both sacroiliac joints    Radicular pain of upper extremity    Cervicalgia     Past Surgical History:   Procedure Laterality Date    BIOPSY CERVICAL, LOCAL EXCISION, SINGLE/MULTIPLE N/A 8/10/2017    Procedure: BIOPSY CERVICAL, LOCAL EXCISION, SINGLE/MULTIPLE;;  Surgeon: Michael Chandler MD;  [facility]    COLONOSCOPY N/A 1/6/2023    Procedure: COLONOSCOPY;  Surgeon: Michael Dozier MD;  [facility]    COLPOSCOPY, BIOPSY, COMBINED N/A 8/10/2017    Procedure: COMBINED COLPOSCOPY, BIOPSY;  Colposcopy with Cervical Biopsies and Endometrial Biopsy, Exam with Ultrasound;  Surgeon: Michael Chandler MD;  [facility]    ESOPHAGOSCOPY, GASTROSCOPY, DUODENOSCOPY (EGD), COMBINED N/A 4/17/2017    Procedure: COMBINED ESOPHAGOSCOPY, GASTROSCOPY, DUODENOSCOPY (EGD);  Surgeon: Ibrahima Esposito MD;  [facility]    ESOPHAGOSCOPY, GASTROSCOPY, DUODENOSCOPY (EGD), COMBINED N/A 1/6/2023    Procedure: ESOPHAGOGASTRODUODENOSCOPY, WITH BIOPSY;  Surgeon: Michael Dozier MD;  [facility]    ESOPHAGOSCOPY, GASTROSCOPY, DUODENOSCOPY (EGD), COMBINED N/A 10/3/2023    Procedure: ESOPHAGOGASTRODUODENOSCOPY, WITH BIOPSY;  Surgeon: John Paul Varela MD;  [facility]    EXAM UNDER ANESTHESIA PELVIC N/A 8/10/2017    Procedure: EXAM UNDER ANESTHESIA PELVIC;;  Surgeon: Michael Chandler MD;  [facility]    HYSTERECTOMY      HYSTERECTOMY, PAP NO LONGER INDICATED      INJECT EPIDURAL CERVICAL N/A 10/20/2023    Procedure: Cervical 6-7 Interlaminar epidural steroid injection using fluoroscopic guidance with contrast dye.;  Surgeon: Trevor Ivory MD;  [facility]    INJECT EPIDURAL CERVICAL N/A 4/19/2024    Procedure: Cervical 6 - Cervical 7 Interlaminar epidural steroid injection using  fluoroscopic guidance with contrast dye.;  Surgeon: Trevor Ivory MD;  [facility]    INJECT EPIDURAL LUMBAR Bilateral 2022    Procedure: Lumbar 5-Sacral 1 Transforaminal Epidural Steroid Injection with fluoroscopic guidance and contrast, bilateral;  Surgeon: Trevor Ivory MD;  [facility]    LAPAROSCOPIC CHOLECYSTECTOMY N/A 2018    Procedure: LAPAROSCOPIC CHOLECYSTECTOMY;  Laparoscopic Cholecystectomy;  Surgeon: Tigre Lowry DO;  [facility]    LAPAROSCOPIC HYSTERECTOMY TOTAL N/A 10/30/2017    Procedure: LAPAROSCOPIC HYSTERECTOMY TOTAL;  LAPAROSCOPIC HYSTERECTOMY TOTAL POSSIBLE SALPINGO-OOPHERECTOMY (BILATERAL);  Surgeon: Michael Chandler MD;  [facility]    ZZHC UGI ENDOSCOPY, SIMPLE EXAM  08      Social History     Tobacco Use    Smoking status: Every Day     Current packs/day: 0.25     Average packs/day: 0.3 packs/day for 20.0 years (5.0 ttl pk-yrs)     Types: Cigarettes     Passive exposure: Never    Smokeless tobacco: Never   Substance Use Topics    Alcohol use: Yes     Comment: Occasionaly      Problem (# of Occurrences) Relation (Name,Age of Onset)    Anxiety Disorder (1) Mother (Lidia)    Asthma (1) Brother (Elvis)    Depression (1) Mother (Lidia)    Hypertension (1) Mother (Lidia)    Respiratory (1) Mother (Lidia)    Cerebrovascular Disease (1) Father (Yohan): First stroke at age 28,  from 2nd when he was 55. Brain aneurysms.    Breast Cancer (1) Cousin (Shalini)    Chronic Obstructive Pulmonary Disease (2) Mother (Lidia), Other (Great maternal aunt)    Adrenal Disorder (1) Other              Current Outpatient Medications   Medication Sig Dispense Refill    acetaminophen (TYLENOL) 500 MG tablet Take 1,000 mg by mouth every 6 hours as needed for mild pain      ALPRAZolam (XANAX) 0.5 MG tablet       baclofen (LIORESAL) 20 MG tablet Current dose 20 mg BID. Start 10 mg in afternoon x 5-7 days, then increase to 20 mg TID. 90 tablet 1    bisacodyl  (DULCOLAX) 5 MG EC tablet Take 1 tablet (5 mg) by mouth every other day Take every other day. If no bowel movement for 2 or more days, take 2 tablets of bisacodyl (10 mg) 30 tablet 2    buprenorphine HCl-naloxone HCl (SUBOXONE) 2-0.5 MG per film Place 0.5 Film under the tongue daily       busPIRone (BUSPAR) 15 MG tablet Take 15 mg by mouth 3 times daily      CONSTULOSE 10 GM/15ML solution TAKE 15 MLS (10 G) BY MOUTH 3 TIMES DAILY AS NEEDED FOR CONSTIPATION (AS NEEDED FOR SEVERE CONSTIPAITON, NO BM FOR MORE THAN 3 DAYS AND FEELING CONSTIPATED) 300 mL 3    cyclobenzaprine (FLEXERIL) 5 MG tablet TAKE 1-2 TABLETS (5-10 MG) BY MOUTH 3 TIMES DAILY AS NEEDED FOR MUSCLE SPASMS 90 tablet 5    DULoxetine (CYMBALTA) 30 MG capsule Take 30 mg in evening, along with 60 mg in morning. 90 capsule 1    DULoxetine (CYMBALTA) 60 MG capsule Take 1 capsule (60 mg) by mouth every morning 90 capsule 1    famotidine (PEPCID) 40 MG tablet Take 1 tablet (40 mg) by mouth 2 times daily 60 tablet 1    linaclotide (LINZESS) 72 MCG capsule Take 1 capsule (72 mcg) by mouth every morning (before breakfast) 30 capsule 1    ondansetron (ZOFRAN ODT) 4 MG ODT tab DISSOLVE ONE TABLET BY MOUTH EVERY 6 HOURS AS NEEDED FOR NAUSEA 20 tablet 1    pantoprazole (PROTONIX) 40 MG EC tablet TAKE ONE TABLET BY MOUTH TWICE A DAY WITH MEALS 60 tablet 5    rOPINIRole (REQUIP) 1 MG tablet TAKE ONE TABLET BY MOUTH EVERY NIGHT AT BEDTIME 90 tablet 0    simvastatin (ZOCOR) 10 MG tablet TAKE ONE TABLET BY MOUTH EVERY NIGHT AT BEDTIME 90 tablet 0    zolpidem (AMBIEN) 5 MG tablet TAKE ONE TABLET (5 MG) BY MOUTH NIGHTLY AS NEEDED FOR SLEEP 30 tablet 1    cetirizine (ZYRTEC) 10 MG tablet TAKE ONE TABLET BY MOUTH EVERY MORNING (Patient not taking: Reported on 5/14/2024) 90 tablet 3    nicotine (NICODERM CQ) 21 MG/24HR 24 hr patch Place 1 patch onto the skin every 24 hours (Patient not taking: Reported on 5/29/2024)      polyethylene glycol (MIRALAX) 17 GM/Dose powder Take 17 g  by mouth daily (Patient not taking: Reported on 5/29/2024) 578 g 3     No current facility-administered medications for this visit.     Allergies   Allergen Reactions    Ergotamine-Caffeine      12-            GI problems-    Seasonal Allergies     Sumatriptan      vomits after giving herself a shot    Compazine Anxiety and Palpitations     Severe anxiety attack    Droperidol Anxiety and Palpitations     Severe anxiety attack    Nubain [Nalbuphine Hcl] Anxiety and Palpitations     Severe anxiety attack    Prochlorperazine Anxiety and Palpitations     Uncontrolled movement, severe anxiety attack       ROS:  10 Point review of systems negative other noted above in HPI    OBJECTIVE:     /82   Pulse 97   Wt 77.2 kg (170 lb 4.8 oz)   LMP  (LMP Unknown)   BMI 29.23 kg/m    Body mass index is 29.23 kg/m .      Gen: Alert, oriented, appropriately interactive, NAD  Eyes: Eyes grossly normal to inspection, conjunctivae and sclerae normal  CV: No edema  Resp: no audible wheeze, cough, or visible cyanosis.  No visible retractions or increased work of breathing.  Able to speak fully in complete sentences.  Abdomen: soft, non tender, non distended, no masses, +bulging with sitting up epigastrum, nontender  External genitalia: no lesions; normal appearing external genitalia, bartholins glands, urethra, skenes glands  Vagina: no masses or lesions or discharge,atrophic  Cervix: surgically absent  Bimanual exam:   Mildly tender pelvic floor muscles diffusely  Urethra: nontender   Bladder: nontender and without massess, well supported   Uterus: surgically absent  Adnexa: no masses or tenderness appreciated   MSK: normal gait, symmetric movements UE & LE  Lower extremities: non-tender, no edema  Neuro: Cranial nerves grossly intact, mentation intact and speech normal  Psych: mentation appears normal, affect normal/bright, judgement and insight intact, normal speech and appearance well-groomed        In-Clinic Test  Results:  No results found. However, due to the size of the patient record, not all encounters were searched. Please check Results Review for a complete set of results.    Narrative & Impression   ULTRASOUND PELVIC TRANSABDOMINAL AND TRANSVAGINAL  2024 12:58 PM     CLINICAL HISTORY: Pelvic pain in female.     TECHNIQUE: Transabdominal scans were performed. Endovaginal ultrasound  was performed to better visualize the adnexa.     COMPARISON: None.     FINDINGS:  UTERUS: The patient is status post hysterectomy.     RIGHT OVARY: Not visualized due to overlying bowel gas.     LEFT OVARY: Not visualized due to overlying bowel gas.     No significant free fluid.                                                                      IMPRESSION:  1.  Surgically absent uterus.  2.  The ovaries are not visualized due to overlying bowel gas. No  evidence for mass or cyst in either adnexal region.  3.  No free fluid.     AMY BRONSON MD         SYSTEM ID:  [id number]     ASSESSMENT/PLAN:                                                      Radha Beckwith is a 48 year old  who presents today for consultation regarding pelvic pain, referral from [facility]       ICD-10-CM    1. Pelvic pain in female  R10.2 Ob/Gyn  Referral     US Pelvic Transabdominal and Transvaginal     Physical Therapy  Referral      2. Mastalgia  N64.4 MA Diagnostic Digital Bilateral      3. Incisional hernia, without obstruction or gangrene  K43.2 Adult Gen Surg  Referral        We discussed that there are many possibly etiologies for pelvic pain, which may include but are not limited to ovarian cyst/mass, ovarian torsion, PID, ectopic pregnancy, uterine fibroids, endometriosis, muskuloskeletal or pelvic floor dysfunction, interstitial cystitis, cystitis, IBS/IBD, chronic constipation, etc.   Based on her symptoms, pelvic floor dysfunction/msk-etiology are high on the differential.  We discussed possible  management options, including PT.. After discussing options, she is open to repeat ultrasound to evaluate ovaries, pelvic floor PT and follow-up for persistent or worsening symptoms.     Breast pain/sensitivity- List of measures given to help with pain/concerns, diagnostic imaging also ordered. If persistent or worsening symptoms, recommend referral to breast center. All questions answered. Patient in agreement.    Incisional hernia from prior surgery, seen on prior CT scan but not reported in formal report. Case reviewed with Dr. Dozier, who agrees. Recommend general surgery consult given size and that she is symptomatic. Referral placed.    Thank you for allowing me to participate in the care of your patient.       I personally reviewed her CT scan images, her last Primary care provider note, her last PT note, prior pain medicine note and last pelvic ultrasound.     45 minutes spent on the date of the encounter doing chart review, history and exam, documentation and further activities as noted above    Clare Carmona DO  [facility]

## 2024-07-25 ENCOUNTER — HOSPITAL ENCOUNTER (OUTPATIENT)
Dept: ULTRASOUND IMAGING | Facility: CLINIC | Age: 49
Discharge: HOME | End: 2024-07-25
Attending: OBSTETRICS & GYNECOLOGY | Admitting: OBSTETRICS & GYNECOLOGY
Payer: COMMERCIAL

## 2024-07-25 ENCOUNTER — VIRTUAL VISIT (OUTPATIENT)
Dept: PSYCHOLOGY | Facility: CLINIC | Age: 49
End: 2024-07-25
Payer: COMMERCIAL

## 2024-07-25 DIAGNOSIS — R10.2 PELVIC PAIN IN FEMALE: ICD-10-CM

## 2024-07-25 DIAGNOSIS — F33.1 MODERATE EPISODE OF RECURRENT MAJOR DEPRESSIVE DISORDER (H): Primary | ICD-10-CM

## 2024-07-25 DIAGNOSIS — F41.1 GENERALIZED ANXIETY DISORDER: ICD-10-CM

## 2024-07-25 PROCEDURE — 90834 PSYTX W PT 45 MINUTES: CPT | Mod: 95 | Performed by: COUNSELOR

## 2024-07-25 PROCEDURE — 76856 US EXAM PELVIC COMPLETE: CPT

## 2024-07-25 PROCEDURE — 76830 TRANSVAGINAL US NON-OB: CPT

## 2024-07-25 ASSESSMENT — ANXIETY QUESTIONNAIRES
6. BECOMING EASILY ANNOYED OR IRRITABLE: NEARLY EVERY DAY
GAD7 TOTAL SCORE: 21
IF YOU CHECKED OFF ANY PROBLEMS ON THIS QUESTIONNAIRE, HOW DIFFICULT HAVE THESE PROBLEMS MADE IT FOR YOU TO DO YOUR WORK, TAKE CARE OF THINGS AT HOME, OR GET ALONG WITH OTHER PEOPLE: EXTREMELY DIFFICULT
1. FEELING NERVOUS, ANXIOUS, OR ON EDGE: NEARLY EVERY DAY
GAD7 TOTAL SCORE: 21
GAD7 TOTAL SCORE: 21
7. FEELING AFRAID AS IF SOMETHING AWFUL MIGHT HAPPEN: NEARLY EVERY DAY
2. NOT BEING ABLE TO STOP OR CONTROL WORRYING: NEARLY EVERY DAY
4. TROUBLE RELAXING: NEARLY EVERY DAY
8. IF YOU CHECKED OFF ANY PROBLEMS, HOW DIFFICULT HAVE THESE MADE IT FOR YOU TO DO YOUR WORK, TAKE CARE OF THINGS AT HOME, OR GET ALONG WITH OTHER PEOPLE?: EXTREMELY DIFFICULT
3. WORRYING TOO MUCH ABOUT DIFFERENT THINGS: NEARLY EVERY DAY
5. BEING SO RESTLESS THAT IT IS HARD TO SIT STILL: NEARLY EVERY DAY
7. FEELING AFRAID AS IF SOMETHING AWFUL MIGHT HAPPEN: NEARLY EVERY DAY

## 2024-07-25 ASSESSMENT — PATIENT HEALTH QUESTIONNAIRE - PHQ9
10. IF YOU CHECKED OFF ANY PROBLEMS, HOW DIFFICULT HAVE THESE PROBLEMS MADE IT FOR YOU TO DO YOUR WORK, TAKE CARE OF THINGS AT HOME, OR GET ALONG WITH OTHER PEOPLE: EXTREMELY DIFFICULT
SUM OF ALL RESPONSES TO PHQ QUESTIONS 1-9: 21
SUM OF ALL RESPONSES TO PHQ QUESTIONS 1-9: 21

## 2024-07-25 NOTE — PROGRESS NOTES
Answers submitted by the patient for this visit:  Patient Health Questionnaire (Submitted on 7/25/2024)  If you checked off any problems, how difficult have these problems made it for you to do your work, take care of things at home, or get along with other people?: Extremely difficult  PHQ9 TOTAL SCORE: 21  BO-7 (Submitted on 7/25/2024)  BO 7 TOTAL SCORE: 21          [facility]                                     Progress Note    Patient Name: Radha Beckwith  Date: 7/25/24         Service Type: Individual      Session Start Time: 2:00pm Session End Time: 2:50pm     Session Length:  50    Session #: 45    Attendees: Client    Service Modality:  Telephone      Provider verified identity through the following two step process.  Patient provided:  Patient is known previously to provider    Telemedicine Visit: The patient's condition can be safely assessed and treated via synchronous audio and visual telemedicine encounter.      Reason for Telemedicine Visit: Patient convenience (e.g. access to timely appointments / distance to available provider)    Originating Site (Patient Location): Patient's home    Distant Site (Provider Location): Provider Remote Setting- Home Office    Consent:  The patient/guardian has verbally consented to: the potential risks and benefits of telemedicine (video visit) versus in person care; bill my insurance or make self-payment for services provided; and responsibility for payment of non-covered services.     Patient would like the video invitation sent by:  My Chart    Mode of Communication:  telephone    Distant Location (Provider):  On-site    As the provider I attest to compliance with applicable laws and regulations related to telemedicine.    DATA  Interactive Complexity: No  Crisis: No        Progress Since Last Session (Related to Symptoms / Goals / Homework):   Symptoms: No change .    Homework: Completed in session      Episode of Care Goals: Satisfactory  progress - MAINTENANCE (Working to maintain change, with risk of relapse); Intervened by continuing to positively reinforce healthy behavior choice      Current / Ongoing Stressors and Concerns:   The client stated she had her gyno exam and they still can't figure out where the pain is coming from.   Thinking she might have a hernia on her chest that that bump has been there for years.        Treatment Objective(s) Addressed in This Session:   Increase interest, engagement, and pleasure in doing things  Feel less tired and more energy during the day        Intervention:   Talked about the medical appointments she recently had and how she's feeling about this. Talked about the things she's been doing for self care- gardening. Checked in about how things are going with her son and their communication.      Assessments completed prior to visit:  The following assessments were completed by patient for this visit:  PHQ9:       5/9/2024    12:29 PM 5/14/2024     2:05 PM 5/23/2024     5:46 AM 6/12/2024     4:05 PM 6/20/2024    12:32 AM 7/3/2024     1:59 PM 7/11/2024     9:40 AM   PHQ-9 SCORE   PHQ-9 Total Score MyChart 24 (Severe depression) 24 (Severe depression) 23 (Severe depression) 21 (Severe depression) 22 (Severe depression) 23 (Severe depression) 22 (Severe depression)   PHQ-9 Total Score 24 24 23 21 22 23 22     GAD7:       3/12/2024     8:32 PM 3/27/2024     8:51 PM 4/17/2024     1:23 PM 5/2/2024     1:11 PM 5/23/2024     5:47 AM 6/12/2024     4:08 PM 7/1/2024     2:51 PM   BO-7 SCORE   Total Score 20 (severe anxiety) 19 (severe anxiety) 21 (severe anxiety) 21 (severe anxiety) 21 (severe anxiety) 21 (severe anxiety) 19 (severe anxiety)   Total Score 20 19 21    21 21    21 21 21 19     PROMIS 10-Global Health (all questions and answers displayed):       11/26/2023    10:43 PM 2/27/2024     6:40 PM 3/12/2024     8:35 PM 3/27/2024     8:53 PM 4/2/2024     4:38 PM 4/10/2024     1:58 PM 4/17/2024     1:28 PM    PROMIS 10   In general, would you say your health is: Fair Fair Poor Poor Poor Poor Poor   In general, would you say your quality of life is: Poor Fair Poor Poor Poor Poor Poor   In general, how would you rate your physical health? Poor Poor Poor Poor Poor Poor Poor   In general, how would you rate your mental health, including your mood and your ability to think? Poor Fair Poor Poor Poor Poor Poor   In general, how would you rate your satisfaction with your social activities and relationships? Poor Poor Poor Poor Poor Poor Poor   In general, please rate how well you carry out your usual social activities and roles Fair Poor Poor Poor Poor Fair Poor   To what extent are you able to carry out your everyday physical activities such as walking, climbing stairs, carrying groceries, or moving a chair? A little A little A little A little A little A little A little   In the past 7 days, how often have you been bothered by emotional problems such as feeling anxious, depressed, or irritable? Always Always Always Always Always Always Always   In the past 7 days, how would you rate your fatigue on average? Severe Severe Severe Severe Severe Severe Very severe   In the past 7 days, how would you rate your pain on average, where 0 means no pain, and 10 means worst imaginable pain? 7 7 8 7 7 7 7   In general, would you say your health is: 2 2 1 1 1 1 1   In general, would you say your quality of life is: 1 2 1 1 1 1 1   In general, how would you rate your physical health? 1 1 1 1 1 1 1   In general, how would you rate your mental health, including your mood and your ability to think? 1 2 1 1 1 1 1   In general, how would you rate your satisfaction with your social activities and relationships? 1 1 1 1 1 1 1   In general, please rate how well you carry out your usual social activities and roles. (This includes activities at home, at work and in your community, and responsibilities as a parent, child, spouse, employee, friend,  etc.) 2 1 1 1 1 2 1   To what extent are you able to carry out your everyday physical activities such as walking, climbing stairs, carrying groceries, or moving a chair? 2 2 2 2 2 2 2   In the past 7 days, how often have you been bothered by emotional problems such as feeling anxious, depressed, or irritable? 5 5 5 5 5 5 5   In the past 7 days, how would you rate your fatigue on average? 4 4 4 4 4 4 5   In the past 7 days, how would you rate your pain on average, where 0 means no pain, and 10 means worst imaginable pain? 7 7 8 7 7 7 7   Global Mental Health Score 4 6 4 4 4 4    4 4    4   Global Physical Health Score 7 7 7 7 7 7    7 6    6   PROMIS TOTAL - SUBSCORES 11 13 11 11 11 11    11 10    10     Holt Suicide Severity Rating Scale (Lifetime/Recent)      10/4/2022    11:00 AM 5/13/2024     3:13 PM   Holt Suicide Severity Rating (Lifetime/Recent)   Q1 Wished to be Dead (Past Month) no 0-->no   Q2 Suicidal Thoughts (Past Month) no 0-->no   Q3 Suicidal Thought Method no    Q4 Suicidal Intent without Specific Plan no    Q5 Suicide Intent with Specific Plan yes    Q6 Suicide Behavior (Lifetime) yes 0-->no   If yes to Q6, within past 3 months? no    Level of Risk per Screen high risk no risks indicated         ASSESSMENT: Current Emotional / Mental Status (status of significant symptoms):   Risk status (Self / Other harm or suicidal ideation)   Patient denies current fears or concerns for personal safety.   Patient denies current or recent suicidal ideation or behaviors.   Patient denies current or recent homicidal ideation or behaviors.   Patient denies current or recent self injurious behavior or ideation.   Patient denies other safety concerns.   Patient reports there has been no change in risk factors since their last session.     Patient reports there has been no change in protective factors since their last session.     Recommended that patient call 911 or go to the local ED should there be a change in  any of these risk factors.     Appearance:   appropriate    Eye Contact:   good    Psychomotor Behavior: Normal    Attitude:   Cooperative    Orientation:   All   Speech    Rate / Production: Normal/ Responsive Normal     Volume:  Normal    Mood:    Normal   Affect:    Normal    Thought Content:  Clear  Rumination    Thought Form:  Coherent    Insight:    Good      Medication Review:   No changes to current psychiatric medication(s)     Medication Compliance:   Yes     Changes in Health Issues:   None reported     Chemical Use Review:   Substance Use: Chemical use reviewed, no active concerns identified      Tobacco Use: No change in amount of tobacco use since last session.  Patient declined discussion at this time    Diagnosis:  1. Moderate episode of recurrent major depressive disorder (H)    2. Generalized anxiety disorder            Collateral Reports Completed:   Not Applicable    PLAN: (Patient Tasks / Therapist Tasks / Other)  Continue to continue to work on self care and communication skills.         Ricarda Bhatia, [facility]                                                         ______________________________________________________________________    Individual Treatment Plan    Patient's Name: Radha Beckwith  YOB: 1975    Date of Creation: 6/28/23  Date Treatment Plan Last Reviewed/Revised: 5/9/24    DSM5 Diagnoses: 296.32 (F33.1) Major Depressive Disorder, Recurrent Episode, Moderate _  Psychosocial / Contextual Factors: living with boyfriend, memory issues  PROMIS (reviewed every 90 days):     Referral / Collaboration:  Referral to another professional/service is not indicated at this time..    Anticipated number of session for this episode of care: 9-12 sessions  Anticipation frequency of session:  as needed  Anticipated Duration of each session: 38-52 minutes  Treatment plan will be reviewed in 90 days or when goals have been changed.       MeasurableTreatment Goal(s) related to  diagnosis / functional impairment(s)  Goal 1: Patient will increase communication skills with family members.    Objective #A (Patient Action)    Patient will learn & utilize at least 2 assertive communication skills weekly.  Status: Continued - Date(s): 5/9/24    Intervention(s)  Therapist will teach emotional regulation skills. distress tolerance skills, interpersonal effectiveness skills, emotion regluation skills, mindfulness skills, radical acceptance. Therapist will teach client how to ID body cues for anxiety, anxiety reduction techniques, how to ID triggers for depression and anxiety- decrease reactivity/eliminate, lifestyle changes to reduce depression and anxiety, communication skills, explore cognitive beliefs and help client to develop healthy cognitive patterns and beliefs.    Objective #B  Patient will use thought-stopping strategy daily to reduce intrusive thoughts.  Status: Continued - Date(s): 5/9/24    Intervention(s)  Therapist will teach emotional regulation skills. distress tolerance skills, interpersonal effectiveness skills, emotion regluation skills, mindfulness skills, radical acceptance. Therapist will teach client how to ID body cues for anxiety, anxiety reduction techniques, how to ID triggers for depression and anxiety- decrease reactivity/eliminate, lifestyle changes to reduce depression and anxiety, communication skills, explore cognitive beliefs and help client to develop healthy cognitive patterns and beliefs.      Goal 2: Patient will decrease depression symptoms.      Objective #A (Patient Action)    Status: Continued - Date(s): 5/9/24    Patient will Increase interest, engagement, and pleasure in doing things  Decrease frequency and intensity of feeling down, depressed, hopeless  Improve quantity and quality of night time sleep / decrease daytime naps  Feel less tired and more energy during the day   Improve diet, appetite, mindful eating, and / or meal planning  Identify negative  self-talk and behaviors: challenge core beliefs, myths, and actions  Improve concentration, focus, and mindfulness in daily activities   Feel less fidgety, restless or slow in daily activities / interpersonal interactions.    Intervention(s)  Therapist will teach emotional regulation skills. distress tolerance skills, interpersonal effectiveness skills, emotion regluation skills, mindfulness skills, radical acceptance. Therapist will teach client how to ID body cues for anxiety, anxiety reduction techniques, how to ID triggers for depression and anxiety- decrease reactivity/eliminate, lifestyle changes to reduce depression and anxiety, communication skills, explore cognitive beliefs and help client to develop healthy cognitive patterns and beliefs.    Objective #B  Patient will identify three distraction and diversion activities and use those activities to decrease level of anxiety  .    Status: Continued - Date(s):  5/9/24    Intervention(s)  Therapist will teach emotional regulation skills. distress tolerance skills, interpersonal effectiveness skills, emotion regluation skills, mindfulness skills, radical acceptance. Therapist will teach client how to ID body cues for anxiety, anxiety reduction techniques, how to ID triggers for depression and anxiety- decrease reactivity/eliminate, lifestyle changes to reduce depression and anxiety, communication skills, explore cognitive beliefs and help client to develop healthy cognitive patterns and beliefs.      Patient has reviewed and agreed to the above plan.      Ricarda Bhatia [facility]

## 2024-07-30 ENCOUNTER — VIRTUAL VISIT (OUTPATIENT)
Dept: PALLIATIVE MEDICINE | Facility: CLINIC | Age: 49
End: 2024-07-30
Payer: COMMERCIAL

## 2024-07-30 DIAGNOSIS — M47.812 ARTHROPATHY OF CERVICAL FACET JOINT: Primary | ICD-10-CM

## 2024-07-30 DIAGNOSIS — M54.41 CHRONIC BILATERAL LOW BACK PAIN WITH BILATERAL SCIATICA: ICD-10-CM

## 2024-07-30 DIAGNOSIS — M54.6 CHRONIC BILATERAL THORACIC BACK PAIN: ICD-10-CM

## 2024-07-30 DIAGNOSIS — G89.29 CHRONIC BILATERAL LOW BACK PAIN WITH BILATERAL SCIATICA: ICD-10-CM

## 2024-07-30 DIAGNOSIS — G89.29 CHRONIC NECK PAIN: ICD-10-CM

## 2024-07-30 DIAGNOSIS — M79.18 MYOFASCIAL PAIN: ICD-10-CM

## 2024-07-30 DIAGNOSIS — G89.29 CHRONIC BILATERAL THORACIC BACK PAIN: ICD-10-CM

## 2024-07-30 DIAGNOSIS — M54.2 CHRONIC NECK PAIN: ICD-10-CM

## 2024-07-30 DIAGNOSIS — M79.642 BILATERAL HAND PAIN: ICD-10-CM

## 2024-07-30 DIAGNOSIS — R10.2 PELVIC PAIN IN FEMALE: Primary | ICD-10-CM

## 2024-07-30 DIAGNOSIS — M79.641 BILATERAL HAND PAIN: ICD-10-CM

## 2024-07-30 DIAGNOSIS — M54.42 CHRONIC BILATERAL LOW BACK PAIN WITH BILATERAL SCIATICA: ICD-10-CM

## 2024-07-30 PROCEDURE — 96158 HLTH BHV IVNTJ INDIV 1ST 30: CPT | Mod: 95 | Performed by: PSYCHOLOGIST

## 2024-07-30 PROCEDURE — 96159 HLTH BHV IVNTJ INDIV EA ADDL: CPT | Mod: 95 | Performed by: PSYCHOLOGIST

## 2024-07-30 NOTE — PROGRESS NOTES
Radha Beckwith is a 48 year old female who is being evaluated via a billable video visit.      Patient is currently in the [facility]? yes    Patient would like the video invitation sent by: Text to cell phone: [phone]}    Video Start Time: 11:00 AM  Video Stop Time: 11:58 AM    Additional provider notes:     Pain Diagnoses per pain provider:    Arthropathy of cervical facet joint     Chronic neck pain     Myofascial pain     Chronic bilateral thoracic back pain     Chronic bilateral low back pain with bilateral sciatica     Bilateral hand pain                DATA: During today's visit you reported the following: Your chronic neck and back pain is unchanged - still quite high. Hands continue to be painful as well. Discussed importance of holding your limits as it relates to pain. Your mood is improved - seeing friend Calvin to help her move has been great. Your activity level is mildly increased - helping friend move. Your stress level is unchanged. Your sleep is worse - waking up not feeling very well rested. Feel like you are in a cycle of 'crashing' every 10 days and struggle to keep your eyes open. You reported engaging in self-care for your pain 1-3 times daily.    You identified that you would like to focus on the following or had questions regarding the following issues or concerns, and we discussed the following:   - ongoing neck pain since MBB injection - haven't reached out to clinic yet to discuss  - have been helping friends move, recognize this is likely contributing to ongoing high neck and back pain  - continuing to have very vivid dreams since starting Temazepam 15 mg for sleep - encouraged to reach out to prescribing provider to discuss  - have hernia, scheduled to see surgeon next week  - need CT of ovaries, unable to visualize on ultrasound per self report  - just not feeling well today, woke up feeling achey all over  - need to reapply to SSDI  - note when son is in town on  leave, your pain level increases and note your body tends to be much more guarded  - discussed checking out '[employer]'    ASSESSMENT: Linette reports pain is unchanged, still quite higher than she'd like. We discussed importance of checking in with JEANNIE Schroeder CNP per her recommendations and to discuss heightened pain. Discussed limiting any lifting as this is likely exacerbating your pain.     PLAN:   Your next appointment is scheduled for 8/13 at 11:00 AM.  Assignment/Objectives /interventions for next session:   - schedule follow up with JEANNIE Schroeder CNP as this is due soon (per visit on 5/29 - schedule follow up 6-8 weeks or sooner as needed)  - check out [employer]    We believe regular attendance is key to your success in our program!    Any time you are unable to keep your appointment we ask that you call us at [phone] at least 24 hours in advance to cancel.This will allow us to offer the appointment time to another patient.   Multiple missed appointments may lead to dismissal from the clinic.    Telemedicine Visit: The patient's condition can be safely assessed and treated via synchronous audio and visual telemedicine encounter.      Reason for Telemedicine Visit: Services only offered telehealth    Originating Site (Patient Location): Patient's home    Distant Site (Provider Location): Provider Remote Setting- Home Office    Consent:  The patient/guardian has verbally consented to: the potential risks and benefits of telemedicine (video visit) versus in person care; bill my insurance or make self-payment for services provided; and responsibility for payment of non-covered services.     Mode of Communication:  Video Conference via Purple    As the provider I attest to compliance with applicable laws and regulations related to telemedicine.      Karen Kohler PsyD LP  Licensed Psychologist  Outpatient Clinic Therapist  [facility]

## 2024-08-05 ENCOUNTER — OFFICE VISIT (OUTPATIENT)
Dept: SURGERY | Facility: CLINIC | Age: 49
End: 2024-08-05
Payer: COMMERCIAL

## 2024-08-05 ENCOUNTER — VIRTUAL VISIT (OUTPATIENT)
Dept: PSYCHOLOGY | Facility: CLINIC | Age: 49
End: 2024-08-05
Payer: COMMERCIAL

## 2024-08-05 ENCOUNTER — THERAPY VISIT (OUTPATIENT)
Dept: PHYSICAL THERAPY | Facility: CLINIC | Age: 49
End: 2024-08-05
Attending: PREVENTIVE MEDICINE
Payer: COMMERCIAL

## 2024-08-05 VITALS
DIASTOLIC BLOOD PRESSURE: 82 MMHG | HEIGHT: 64 IN | TEMPERATURE: 95.8 F | WEIGHT: 170 LBS | BODY MASS INDEX: 29.02 KG/M2 | SYSTOLIC BLOOD PRESSURE: 130 MMHG

## 2024-08-05 DIAGNOSIS — K43.2 INCISIONAL HERNIA, WITHOUT OBSTRUCTION OR GANGRENE: ICD-10-CM

## 2024-08-05 DIAGNOSIS — K91.89 PORT-SITE HERNIA: ICD-10-CM

## 2024-08-05 DIAGNOSIS — F33.1 MODERATE EPISODE OF RECURRENT MAJOR DEPRESSIVE DISORDER (H): Primary | ICD-10-CM

## 2024-08-05 DIAGNOSIS — K43.2 PORT-SITE HERNIA: ICD-10-CM

## 2024-08-05 DIAGNOSIS — K43.0 INCARCERATED INCISIONAL HERNIA: Primary | ICD-10-CM

## 2024-08-05 DIAGNOSIS — M54.2 CERVICALGIA: Primary | ICD-10-CM

## 2024-08-05 DIAGNOSIS — F41.1 GENERALIZED ANXIETY DISORDER: ICD-10-CM

## 2024-08-05 PROCEDURE — 99244 OFF/OP CNSLTJ NEW/EST MOD 40: CPT | Performed by: SPECIALIST

## 2024-08-05 PROCEDURE — 97140 MANUAL THERAPY 1/> REGIONS: CPT | Mod: GP | Performed by: PHYSICAL THERAPIST

## 2024-08-05 PROCEDURE — 90837 PSYTX W PT 60 MINUTES: CPT | Mod: 95 | Performed by: COUNSELOR

## 2024-08-05 PROCEDURE — 97110 THERAPEUTIC EXERCISES: CPT | Mod: GP | Performed by: PHYSICAL THERAPIST

## 2024-08-05 ASSESSMENT — PAIN SCALES - GENERAL: PAINLEVEL: MILD PAIN (3)

## 2024-08-05 NOTE — PROGRESS NOTES
Answers submitted by the patient for this visit:  Patient Health Questionnaire (Submitted on 8/4/2024)  If you checked off any problems, how difficult have these problems made it for you to do your work, take care of things at home, or get along with other people?: Extremely difficult  PHQ9 TOTAL SCORE: 21          [facility]                                     Progress Note    Patient Name: Radha Beckwith  Date: 8/5/24         Service Type: Individual      Session Start Time: 1:30pm Session End Time: 2:30pm     Session Length:  60    Session #: 46    Attendees: Client    Service Modality:  Telephone      Provider verified identity through the following two step process.  Patient provided:  Patient is known previously to provider    Telemedicine Visit: The patient's condition can be safely assessed and treated via synchronous audio and visual telemedicine encounter.      Reason for Telemedicine Visit: Patient convenience (e.g. access to timely appointments / distance to available provider)    Originating Site (Patient Location): Patient's home    Distant Site (Provider Location): Provider Remote Setting- Home Office    Consent:  The patient/guardian has verbally consented to: the potential risks and benefits of telemedicine (video visit) versus in person care; bill my insurance or make self-payment for services provided; and responsibility for payment of non-covered services.     Patient would like the video invitation sent by:  My Chart    Mode of Communication:  telephone    Distant Location (Provider):  On-site    As the provider I attest to compliance with applicable laws and regulations related to telemedicine.    DATA  Interactive Complexity: No  Crisis: No        Progress Since Last Session (Related to Symptoms / Goals / Homework):   Symptoms: No change .    Homework: Completed in session      Episode of Care Goals: Satisfactory progress - MAINTENANCE (Working to maintain change, with  risk of relapse); Intervened by continuing to positively reinforce healthy behavior choice      Current / Ongoing Stressors and Concerns:   The client has a lot of appointments coming up.    Stated she wants to work more on intimacy with her boyfriend.         Treatment Objective(s) Addressed in This Session:   Increase interest, engagement, and pleasure in doing things  Feel less tired and more energy during the day        Intervention:   Processed through her recent attempts at intimacy with her boyfriend and how she perceived it as a rejection. Processed through this and talked about how he may have been feeling too at the time. Educated her on how to facilitate more intimacy through communication skills and being more intentional with her boyfriend.       Assessments completed prior to visit:  The following assessments were completed by patient for this visit:  PHQ9:       5/23/2024     5:46 AM 6/12/2024     4:05 PM 6/20/2024    12:32 AM 7/3/2024     1:59 PM 7/11/2024     9:40 AM 7/25/2024    11:51 AM 8/4/2024     7:19 PM   PHQ-9 SCORE   PHQ-9 Total Score MyChart 23 (Severe depression) 21 (Severe depression) 22 (Severe depression) 23 (Severe depression) 22 (Severe depression) 21 (Severe depression) 21 (Severe depression)   PHQ-9 Total Score 23 21 22 23 22 21 21     GAD7:       3/27/2024     8:51 PM 4/17/2024     1:23 PM 5/2/2024     1:11 PM 5/23/2024     5:47 AM 6/12/2024     4:08 PM 7/1/2024     2:51 PM 7/25/2024    11:52 AM   BO-7 SCORE   Total Score 19 (severe anxiety) 21 (severe anxiety) 21 (severe anxiety) 21 (severe anxiety) 21 (severe anxiety) 19 (severe anxiety) 21 (severe anxiety)   Total Score 19 21    21 21    21 21 21 19 21     PROMIS 10-Global Health (all questions and answers displayed):       3/12/2024     8:35 PM 3/27/2024     8:53 PM 4/2/2024     4:38 PM 4/10/2024     1:58 PM 4/17/2024     1:28 PM 7/25/2024    11:56 AM 8/4/2024     7:20 PM   PROMIS 10   In general, would you say your health is:  Poor Poor Poor Poor Poor Poor Poor   In general, would you say your quality of life is: Poor Poor Poor Poor Poor Fair Poor   In general, how would you rate your physical health? Poor Poor Poor Poor Poor Poor Poor   In general, how would you rate your mental health, including your mood and your ability to think? Poor Poor Poor Poor Poor Poor Poor   In general, how would you rate your satisfaction with your social activities and relationships? Poor Poor Poor Poor Poor Poor Fair   In general, please rate how well you carry out your usual social activities and roles Poor Poor Poor Fair Poor Poor Poor   To what extent are you able to carry out your everyday physical activities such as walking, climbing stairs, carrying groceries, or moving a chair? A little A little A little A little A little A little Moderately   In the past 7 days, how often have you been bothered by emotional problems such as feeling anxious, depressed, or irritable? Always Always Always Always Always Always Always   In the past 7 days, how would you rate your fatigue on average? Severe Severe Severe Severe Very severe Severe Severe   In the past 7 days, how would you rate your pain on average, where 0 means no pain, and 10 means worst imaginable pain? 8 7 7 7 7 7 7   In general, would you say your health is: 1 1 1 1 1 1 1   In general, would you say your quality of life is: 1 1 1 1 1 2 1   In general, how would you rate your physical health? 1 1 1 1 1 1 1   In general, how would you rate your mental health, including your mood and your ability to think? 1 1 1 1 1 1 1   In general, how would you rate your satisfaction with your social activities and relationships? 1 1 1 1 1 1 2   In general, please rate how well you carry out your usual social activities and roles. (This includes activities at home, at work and in your community, and responsibilities as a parent, child, spouse, employee, friend, etc.) 1 1 1 2 1 1 1   To what extent are you able to  carry out your everyday physical activities such as walking, climbing stairs, carrying groceries, or moving a chair? 2 2 2 2 2 2 3   In the past 7 days, how often have you been bothered by emotional problems such as feeling anxious, depressed, or irritable? 5 5 5 5 5 5 5   In the past 7 days, how would you rate your fatigue on average? 4 4 4 4 5 4 4   In the past 7 days, how would you rate your pain on average, where 0 means no pain, and 10 means worst imaginable pain? 8 7 7 7 7 7 7   Global Mental Health Score 4 4 4 4    4 4    4 5    5 5   Global Physical Health Score 7 7 7 7    7 6    6 7    7 8   PROMIS TOTAL - SUBSCORES 11 11 11 11    11 10    10 12    12 13     [place] Suicide Severity Rating Scale (Lifetime/Recent)      10/4/2022    11:00 AM 5/13/2024     3:13 PM   [place] Suicide Severity Rating (Lifetime/Recent)   Q1 Wished to be Dead (Past Month) no 0-->no   Q2 Suicidal Thoughts (Past Month) no 0-->no   Q3 Suicidal Thought Method no    Q4 Suicidal Intent without Specific Plan no    Q5 Suicide Intent with Specific Plan yes    Q6 Suicide Behavior (Lifetime) yes 0-->no   If yes to Q6, within past 3 months? no    Level of Risk per Screen high risk no risks indicated         ASSESSMENT: Current Emotional / Mental Status (status of significant symptoms):   Risk status (Self / Other harm or suicidal ideation)   Patient denies current fears or concerns for personal safety.   Patient denies current or recent suicidal ideation or behaviors.   Patient denies current or recent homicidal ideation or behaviors.   Patient denies current or recent self injurious behavior or ideation.   Patient denies other safety concerns.   Patient reports there has been no change in risk factors since their last session.     Patient reports there has been no change in protective factors since their last session.     Recommended that patient call 911 or go to the local ED should there be a change in any of these risk  factors.     Appearance:   appropriate    Eye Contact:   good    Psychomotor Behavior: Normal    Attitude:   Cooperative    Orientation:   All   Speech    Rate / Production: Normal/ Responsive Normal     Volume:  Normal    Mood:    Normal   Affect:    Normal    Thought Content:  Clear  Rumination    Thought Form:  Coherent    Insight:    Good      Medication Review:   No changes to current psychiatric medication(s)     Medication Compliance:   Yes     Changes in Health Issues:   None reported     Chemical Use Review:   Substance Use: Chemical use reviewed, no active concerns identified      Tobacco Use: No change in amount of tobacco use since last session.  Patient declined discussion at this time    Diagnosis:  1. Moderate episode of recurrent major depressive disorder (H)    2. Generalized anxiety disorder            Collateral Reports Completed:   Not Applicable    PLAN: (Patient Tasks / Therapist Tasks / Other)  Continue to continue to work on self care and communication skills.         Ricarda Bhatia, [facility]                                                         ______________________________________________________________________    Individual Treatment Plan    Patient's Name: Radha Beckwith  YOB: 1975    Date of Creation: 6/28/23  Date Treatment Plan Last Reviewed/Revised: 5/9/24    DSM5 Diagnoses: 296.32 (F33.1) Major Depressive Disorder, Recurrent Episode, Moderate _  Psychosocial / Contextual Factors: living with boyfriend, memory issues  PROMIS (reviewed every 90 days):     Referral / Collaboration:  Referral to another professional/service is not indicated at this time..    Anticipated number of session for this episode of care: 9-12 sessions  Anticipation frequency of session:  as needed  Anticipated Duration of each session: 38-52 minutes  Treatment plan will be reviewed in 90 days or when goals have been changed.       MeasurableTreatment Goal(s) related to diagnosis /  functional impairment(s)  Goal 1: Patient will increase communication skills with family members.    Objective #A (Patient Action)    Patient will learn & utilize at least 2 assertive communication skills weekly.  Status: Continued - Date(s): 5/9/24    Intervention(s)  Therapist will teach emotional regulation skills. distress tolerance skills, interpersonal effectiveness skills, emotion regluation skills, mindfulness skills, radical acceptance. Therapist will teach client how to ID body cues for anxiety, anxiety reduction techniques, how to ID triggers for depression and anxiety- decrease reactivity/eliminate, lifestyle changes to reduce depression and anxiety, communication skills, explore cognitive beliefs and help client to develop healthy cognitive patterns and beliefs.    Objective #B  Patient will use thought-stopping strategy daily to reduce intrusive thoughts.  Status: Continued - Date(s): 5/9/24    Intervention(s)  Therapist will teach emotional regulation skills. distress tolerance skills, interpersonal effectiveness skills, emotion regluation skills, mindfulness skills, radical acceptance. Therapist will teach client how to ID body cues for anxiety, anxiety reduction techniques, how to ID triggers for depression and anxiety- decrease reactivity/eliminate, lifestyle changes to reduce depression and anxiety, communication skills, explore cognitive beliefs and help client to develop healthy cognitive patterns and beliefs.      Goal 2: Patient will decrease depression symptoms.      Objective #A (Patient Action)    Status: Continued - Date(s): 5/9/24    Patient will Increase interest, engagement, and pleasure in doing things  Decrease frequency and intensity of feeling down, depressed, hopeless  Improve quantity and quality of night time sleep / decrease daytime naps  Feel less tired and more energy during the day   Improve diet, appetite, mindful eating, and / or meal planning  Identify negative self-talk  and behaviors: challenge core beliefs, myths, and actions  Improve concentration, focus, and mindfulness in daily activities   Feel less fidgety, restless or slow in daily activities / interpersonal interactions.    Intervention(s)  Therapist will teach emotional regulation skills. distress tolerance skills, interpersonal effectiveness skills, emotion regluation skills, mindfulness skills, radical acceptance. Therapist will teach client how to ID body cues for anxiety, anxiety reduction techniques, how to ID triggers for depression and anxiety- decrease reactivity/eliminate, lifestyle changes to reduce depression and anxiety, communication skills, explore cognitive beliefs and help client to develop healthy cognitive patterns and beliefs.    Objective #B  Patient will identify three distraction and diversion activities and use those activities to decrease level of anxiety  .    Status: Continued - Date(s):  5/9/24    Intervention(s)  Therapist will teach emotional regulation skills. distress tolerance skills, interpersonal effectiveness skills, emotion regluation skills, mindfulness skills, radical acceptance. Therapist will teach client how to ID body cues for anxiety, anxiety reduction techniques, how to ID triggers for depression and anxiety- decrease reactivity/eliminate, lifestyle changes to reduce depression and anxiety, communication skills, explore cognitive beliefs and help client to develop healthy cognitive patterns and beliefs.      Patient has reviewed and agreed to the above plan.      Ricarda Bhatia [facility]

## 2024-08-05 NOTE — PROGRESS NOTES
Consult requested by Dr. Carmona    Reason for consultation: Ventral hernia      HPI:  Patient is a 48-year-old white female who noticed about 4 years ago she had a bulge in her upper abdomen.  There is associated pain with the site and occasional nausea.  She does have a history of a laparoscopic cholecystectomy in 2017.  Several years later she was taking care of her ill mother and was lifting her.  Then noticed the bulge after that.  Denies any fevers chills or obstructive symptoms.  She smokes was quarter pack a day but is trying to quit.  She is not diabetic.  CT revealed a hernia containing fat for which she is now referred.    Past Medical History:   Diagnosis Date    Abdominal pain, right lower quadrant 03/09/2008    Admit. Discharged 03/10/08    Anxiety attack 07/31/2015    Atypical chest pain 06/23/2015    De Quervain's disease (tenosynovitis)     Dehydration     Depressive disorder 1996    Gastric ulcer 07/31/2015    GERD (gastroesophageal reflux disease) 07/28/2010    Takes omeprazole and metoclopramide     Hypertension 2017    Ingrowing nail 01/09/2014    Migraines     Opioid dependence in remission (H) 08/02/2015    Other and unspecified ovarian cyst     Papanicolaou smear of cervix with low grade squamous intraepithelial lesion (LGSIL) 07/07/2017     Past Surgical History:   Procedure Laterality Date    BIOPSY CERVICAL, LOCAL EXCISION, SINGLE/MULTIPLE N/A 8/10/2017    Procedure: BIOPSY CERVICAL, LOCAL EXCISION, SINGLE/MULTIPLE;;  Surgeon: Michael Chandler MD;  [facility]    COLONOSCOPY N/A 1/6/2023    Procedure: COLONOSCOPY;  Surgeon: Michael Dozier MD;  [facility]    COLPOSCOPY, BIOPSY, COMBINED N/A 8/10/2017    Procedure: COMBINED COLPOSCOPY, BIOPSY;  Colposcopy with Cervical Biopsies and Endometrial Biopsy, Exam with Ultrasound;  Surgeon: Michael Chandler MD;  [facility]    ESOPHAGOSCOPY, GASTROSCOPY, DUODENOSCOPY (EGD), COMBINED N/A 4/17/2017    Procedure:  COMBINED ESOPHAGOSCOPY, GASTROSCOPY, DUODENOSCOPY (EGD);  Surgeon: Ibrahima Esposito MD;  [facility]    ESOPHAGOSCOPY, GASTROSCOPY, DUODENOSCOPY (EGD), COMBINED N/A 1/6/2023    Procedure: ESOPHAGOGASTRODUODENOSCOPY, WITH BIOPSY;  Surgeon: Michael Dozier MD;  [facility]    ESOPHAGOSCOPY, GASTROSCOPY, DUODENOSCOPY (EGD), COMBINED N/A 10/3/2023    Procedure: ESOPHAGOGASTRODUODENOSCOPY, WITH BIOPSY;  Surgeon: John Paul Varela MD;  [facility]    EXAM UNDER ANESTHESIA PELVIC N/A 8/10/2017    Procedure: EXAM UNDER ANESTHESIA PELVIC;;  Surgeon: Michael Chandler MD;  [facility]    HYSTERECTOMY      HYSTERECTOMY, PAP NO LONGER INDICATED      INJECT EPIDURAL CERVICAL N/A 10/20/2023    Procedure: Cervical 6-7 Interlaminar epidural steroid injection using fluoroscopic guidance with contrast dye.;  Surgeon: Trevor Ivory MD;  [facility]    INJECT EPIDURAL CERVICAL N/A 4/19/2024    Procedure: Cervical 6 - Cervical 7 Interlaminar epidural steroid injection using fluoroscopic guidance with contrast dye.;  Surgeon: Trevor Ivory MD;  [facility]    INJECT EPIDURAL LUMBAR Bilateral 7/1/2022    Procedure: Lumbar 5-Sacral 1 Transforaminal Epidural Steroid Injection with fluoroscopic guidance and contrast, bilateral;  Surgeon: Trevor Ivory MD;  [facility]    LAPAROSCOPIC CHOLECYSTECTOMY N/A 2/2/2018    Procedure: LAPAROSCOPIC CHOLECYSTECTOMY;  Laparoscopic Cholecystectomy;  Surgeon: Tigre Lowry DO;  [facility]    LAPAROSCOPIC HYSTERECTOMY TOTAL N/A 10/30/2017    Procedure: LAPAROSCOPIC HYSTERECTOMY TOTAL;  LAPAROSCOPIC HYSTERECTOMY TOTAL POSSIBLE SALPINGO-OOPHERECTOMY (BILATERAL);  Surgeon: Michael Chandler MD;  Location: [facility]    [facility] UGI ENDOSCOPY, SIMPLE EXAM  01/07/08     Current Outpatient Medications   Medication Sig Dispense Refill    acetaminophen (TYLENOL) 500 MG tablet Take 1,000 mg by mouth every 6 hours as needed for mild pain      ALPRAZolam  (XANAX) 0.5 MG tablet       baclofen (LIORESAL) 20 MG tablet Current dose 20 mg BID. Start 10 mg in afternoon x 5-7 days, then increase to 20 mg TID. 90 tablet 1    bisacodyl (DULCOLAX) 5 MG EC tablet Take 1 tablet (5 mg) by mouth every other day Take every other day. If no bowel movement for 2 or more days, take 2 tablets of bisacodyl (10 mg) 30 tablet 2    buprenorphine HCl-naloxone HCl (SUBOXONE) 2-0.5 MG per film Place 0.5 Film under the tongue daily       busPIRone (BUSPAR) 15 MG tablet Take 15 mg by mouth 3 times daily      CONSTULOSE 10 GM/15ML solution TAKE 15 MLS (10 G) BY MOUTH 3 TIMES DAILY AS NEEDED FOR CONSTIPATION (AS NEEDED FOR SEVERE CONSTIPAITON, NO BM FOR MORE THAN 3 DAYS AND FEELING CONSTIPATED) 300 mL 3    cyclobenzaprine (FLEXERIL) 5 MG tablet TAKE 1-2 TABLETS (5-10 MG) BY MOUTH 3 TIMES DAILY AS NEEDED FOR MUSCLE SPASMS 90 tablet 5    DULoxetine (CYMBALTA) 30 MG capsule Take 30 mg in evening, along with 60 mg in morning. 90 capsule 1    DULoxetine (CYMBALTA) 60 MG capsule Take 1 capsule (60 mg) by mouth every morning 90 capsule 1    famotidine (PEPCID) 40 MG tablet Take 1 tablet (40 mg) by mouth 2 times daily 60 tablet 1    linaclotide (LINZESS) 72 MCG capsule Take 1 capsule (72 mcg) by mouth every morning (before breakfast) 30 capsule 1    ondansetron (ZOFRAN ODT) 4 MG ODT tab DISSOLVE ONE TABLET BY MOUTH EVERY 6 HOURS AS NEEDED FOR NAUSEA 20 tablet 1    pantoprazole (PROTONIX) 40 MG EC tablet TAKE ONE TABLET BY MOUTH TWICE A DAY WITH MEALS 60 tablet 5    rOPINIRole (REQUIP) 1 MG tablet TAKE ONE TABLET BY MOUTH EVERY NIGHT AT BEDTIME 90 tablet 0    simvastatin (ZOCOR) 10 MG tablet TAKE ONE TABLET BY MOUTH EVERY NIGHT AT BEDTIME 90 tablet 1    zolpidem (AMBIEN) 5 MG tablet TAKE ONE TABLET (5 MG) BY MOUTH NIGHTLY AS NEEDED FOR SLEEP 30 tablet 1    cetirizine (ZYRTEC) 10 MG tablet TAKE ONE TABLET BY MOUTH EVERY MORNING (Patient not taking: Reported on 5/14/2024) 90 tablet 3    polyethylene glycol  (MIRALAX) 17 GM/Dose powder Take 17 g by mouth daily (Patient not taking: Reported on 5/29/2024) 578 g 3     No current facility-administered medications for this visit.        Allergies   Allergen Reactions    Ergotamine-Caffeine      12-            GI problems-    Seasonal Allergies     Sumatriptan      vomits after giving herself a shot    Compazine Anxiety and Palpitations     Severe anxiety attack    Droperidol Anxiety and Palpitations     Severe anxiety attack    Nubain [Nalbuphine Hcl] Anxiety and Palpitations     Severe anxiety attack    Prochlorperazine Anxiety and Palpitations     Uncontrolled movement, severe anxiety attack     Social History     Socioeconomic History    Marital status: Single     Spouse name: Not on file    Number of children: Not on file    Years of education: Not on file    Highest education level: Not on file   Occupational History    Not on file   Tobacco Use    Smoking status: Every Day     Current packs/day: 0.25     Average packs/day: 0.3 packs/day for 20.0 years (5.0 ttl pk-yrs)     Types: Cigarettes     Passive exposure: Never    Smokeless tobacco: Never   Vaping Use    Vaping status: Former    Substances: Nicotine, CBD    Devices: Refillable tank   Substance and Sexual Activity    Alcohol use: Yes     Comment: Occasionaly    Drug use: No    Sexual activity: Not Currently     Partners: Male     Birth control/protection: Female Surgical   Other Topics Concern    Parent/sibling w/ CABG, MI or angioplasty before 65F 55M? No   Social History Narrative    Not on file     Social Determinants of Health     Financial Resource Strain: Low Risk  (1/8/2024)    Financial Resource Strain     Within the past 12 months, have you or your family members you live with been unable to get utilities (heat, electricity) when it was really needed?: No   Food Insecurity: Low Risk  (1/8/2024)    Food Insecurity     Within the past 12 months, did you worry that your food would run out before you  got money to buy more?: No     Within the past 12 months, did the food you bought just not last and you didn t have money to get more?: No   Transportation Needs: High Risk (2024)    Transportation Needs     Within the past 12 months, has lack of transportation kept you from medical appointments, getting your medicines, non-medical meetings or appointments, work, or from getting things that you need?: Yes   Physical Activity: Not on file   Stress: Not on file   Social Connections: Unknown (2022)    Received from [facility]    Social Connections     Frequency of Communication with Friends and Family: Not on file   Interpersonal Safety: Low Risk  (2023)    Interpersonal Safety     Do you feel physically and emotionally safe where you currently live?: Yes     Within the past 12 months, have you been hit, slapped, kicked or otherwise physically hurt by someone?: No     Within the past 12 months, have you been humiliated or emotionally abused in other ways by your partner or ex-partner?: No   Housing Stability: Low Risk  (2024)    Housing Stability     Do you have housing? : Yes     Are you worried about losing your housing?: No     Family History   Problem Relation Age of Onset    Depression Mother     Respiratory Mother     Chronic Obstructive Pulmonary Disease Mother     Hypertension Mother     Anxiety Disorder Mother     Cerebrovascular Disease Father         First stroke at age 28,  from 2nd when he was 55. Brain aneurysms.    Breast Cancer Cousin     Adrenal Disorder Other     Chronic Obstructive Pulmonary Disease Other     Asthma Brother       ROS: 10 point ROS neg other than the symptoms noted above in the HPI.    PE:  B/P: 130/82, T: 95.8, P: Data Unavailable, R: Data Unavailable  General: well developed, well nourished WF who appears their stated age  HEENT: NC/AT, EOMI, (-)icterus, (-)injection  Neck: Supple,  No JVD  Chest: CTA  Heart: S1, S2, (-)m/r/g  Abd: Soft, non tender, non distended, non tender, no masses, incarcerated subxiphoid hernia at port site  Rectal: No masses  Ext; Warm, no edema  Psych: AAOx3, anxious affect  Neuro: No focal deficits      CT -   CT ABDOMEN AND PELVIS WITH CONTRAST 2/17/2023 2:27 PM     CLINICAL HISTORY: Complains of lumps on abdomen umbilicus and  epigastrial incision for cholecystectomy (hernia?). Also, chronic  constipation. Poor bowel prep for colonoscopy; Constipation due to  opioid therapy; Abdominal mass, unspecified abdominal location.     TECHNIQUE: CT scan of the abdomen and pelvis was performed following  injection of IV contrast. Multiplanar reformats were obtained. Dose  reduction techniques were used.     CONTRAST: Isovue-370, 85mL     COMPARISON: CT abdomen and pelvis dated 7/6/2021.     FINDINGS:   LOWER CHEST: There is mild dependant atelectasis posterior bilateral  lower lungs. Minimal scattered bulla are noted in the visualized  portions of the lungs. There is minimal thickening of the wall of the  distal esophagus which could be due to inflammation from reflux.  Visualized portions of the lower chest are otherwise unremarkable.     HEPATOBILIARY: There is mild diffuse hepatic steatosis. No focal  intrahepatic lesion, biliary ductal dilatation, or  choledocholithiasis. Gallbladder is surgically absent.     PANCREAS: Normal.     SPLEEN: Calcified granulomata in the spleen are again noted and  correspond with chronic granulomatous disease. Spleen is otherwise  normal in appearance.     ADRENAL GLANDS: Normal.     KIDNEYS/BLADDER: Normal.     BOWEL: There is a moderate amount of stool in the colon. The colon is  otherwise normal in appearance. No pericolonic inflammatory change to  suggest acute diverticulitis. Appendix is normal in appearance without  evidence for appendicitis. Small bowel is grossly of normal caliber  and appearance. The stomach contains a moderate  amount of fluid and a  small amount of other ingested material. It is otherwise unremarkable.     PELVIC ORGANS: Uterus is not seen consistent with prior hysterectomy.  Structure in the right adnexa could represent an atrophic right ovary  or a bowel loop. Ovaries are otherwise not well seen. No adnexal mass  is identified.     ADDITIONAL FINDINGS: No abnormal free fluid or free air is seen in the  peritoneal cavity. No adenopathy is identified. There is mild  nonaneurysmal atherosclerosis.     MUSCULOSKELETAL: Normal.                                                                      IMPRESSION:   1.  Moderate amount of stool is noted in the colon. The bowel is  otherwise grossly unremarkable without evidence for diverticulitis,  appendicitis, enteritis, colitis, or obstruction.  2.  Postop changes status post cholecystectomy and probable  hysterectomy. Possible oophorectomy.  3.  Mild diffuse hepatic steatosis.  4.  Evidence for granulomatous disease of the spleen.     WILTON HARGROVE MD     Images reviewed: Incarcerated fat hernia defect approximately detention between the xiphoid and the umbilicus.      Impression/plan  This is a 48-year-old lady with a symptomatic incarcerated port site hernia in her upper abdomen.  I discussed the etiology of hernias as well as the role of smoking and smoking cessation.  After discussion the patient the plan at this time is for robotically assisted incisional hernia repair.   The procedure, risks, benefits, and alternatives were discussed and the patient agrees to proceed.  She will be scheduled in near future.  Continued efforts at smoking cessation was strongly encouraged.    Michael Dozier MD, FACS

## 2024-08-05 NOTE — LETTER
8/5/2024      Radha Beckwith  [address] [facility] [address]      Dear Colleague,    Thank you for referring your patient, Radha Beckwith, to the [facility]. Please see a copy of my visit note below.    Consult requested by Dr. Carmona    Reason for consultation: Ventral hernia      HPI:  Patient is a 48-year-old white female who noticed about 4 years ago she had a bulge in her upper abdomen.  There is associated pain with the site and occasional nausea.  She does have a history of a laparoscopic cholecystectomy in 2017.  Several years later she was taking care of her ill mother and was lifting her.  Then noticed the bulge after that.  Denies any fevers chills or obstructive symptoms.  She smokes was quarter pack a day but is trying to quit.  She is not diabetic.  CT revealed a hernia containing fat for which she is now referred.    Past Medical History:   Diagnosis Date     Abdominal pain, right lower quadrant 03/09/2008    Admit. Discharged 03/10/08     Anxiety attack 07/31/2015     Atypical chest pain 06/23/2015     De Quervain's disease (tenosynovitis)      Dehydration      Depressive disorder 1996     Gastric ulcer 07/31/2015     GERD (gastroesophageal reflux disease) 07/28/2010    Takes omeprazole and metoclopramide      Hypertension 2017     Ingrowing nail 01/09/2014     Migraines      Opioid dependence in remission (H) 08/02/2015     Other and unspecified ovarian cyst      Papanicolaou smear of cervix with low grade squamous intraepithelial lesion (LGSIL) 07/07/2017     Past Surgical History:   Procedure Laterality Date     BIOPSY CERVICAL, LOCAL EXCISION, SINGLE/MULTIPLE N/A 8/10/2017    Procedure: BIOPSY CERVICAL, LOCAL EXCISION, SINGLE/MULTIPLE;;  Surgeon: Michael Chandler MD;  Location: [facility] OR     COLONOSCOPY N/A 1/6/2023    Procedure: COLONOSCOPY;  Surgeon: Michael Dozier MD;  Location:  GI     COLPOSCOPY, BIOPSY, COMBINED N/A 8/10/2017    Procedure:  COMBINED COLPOSCOPY, BIOPSY;  Colposcopy with Cervical Biopsies and Endometrial Biopsy, Exam with Ultrasound;  Surgeon: Michael Chandler MD;  Location: [facility] OR     ESOPHAGOSCOPY, GASTROSCOPY, DUODENOSCOPY (EGD), COMBINED N/A 4/17/2017    Procedure: COMBINED ESOPHAGOSCOPY, GASTROSCOPY, DUODENOSCOPY (EGD);  Surgeon: Ibrahima Esposito MD;  Location: [facility] GI     ESOPHAGOSCOPY, GASTROSCOPY, DUODENOSCOPY (EGD), COMBINED N/A 1/6/2023    Procedure: ESOPHAGOGASTRODUODENOSCOPY, WITH BIOPSY;  Surgeon: Michael Dozier MD;  Location: [facility]     ESOPHAGOSCOPY, GASTROSCOPY, DUODENOSCOPY (EGD), COMBINED N/A 10/3/2023    Procedure: ESOPHAGOGASTRODUODENOSCOPY, WITH BIOPSY;  Surgeon: John Paul Varela MD;  Location: [facility] GI     EXAM UNDER ANESTHESIA PELVIC N/A 8/10/2017    Procedure: EXAM UNDER ANESTHESIA PELVIC;;  Surgeon: Michael Chandler MD;  Location: [facility] OR     HYSTERECTOMY       HYSTERECTOMY, PAP NO LONGER INDICATED       INJECT EPIDURAL CERVICAL N/A 10/20/2023    Procedure: Cervical 6-7 Interlaminar epidural steroid injection using fluoroscopic guidance with contrast dye.;  Surgeon: Trevor Ivory MD;  Location: [facility] OR     INJECT EPIDURAL CERVICAL N/A 4/19/2024    Procedure: Cervical 6 - Cervical 7 Interlaminar epidural steroid injection using fluoroscopic guidance with contrast dye.;  Surgeon: Trevor Ivory MD;  Location: [facility]     INJECT EPIDURAL LUMBAR Bilateral 7/1/2022    Procedure: Lumbar 5-Sacral 1 Transforaminal Epidural Steroid Injection with fluoroscopic guidance and contrast, bilateral;  Surgeon: Trevor Ivory MD;  Location: [facility]     LAPAROSCOPIC CHOLECYSTECTOMY N/A 2/2/2018    Procedure: LAPAROSCOPIC CHOLECYSTECTOMY;  Laparoscopic Cholecystectomy;  Surgeon: Tigre Lowry DO;  Location: [facility]     LAPAROSCOPIC HYSTERECTOMY TOTAL N/A 10/30/2017    Procedure: LAPAROSCOPIC HYSTERECTOMY TOTAL;  LAPAROSCOPIC HYSTERECTOMY TOTAL POSSIBLE SALPINGO-OOPHERECTOMY (BILATERAL);  Surgeon: Ramesh  Michael Dixon MD;  Location:  OR     [facility] UGI ENDOSCOPY, SIMPLE EXAM  01/07/08     Current Outpatient Medications   Medication Sig Dispense Refill     acetaminophen (TYLENOL) 500 MG tablet Take 1,000 mg by mouth every 6 hours as needed for mild pain       ALPRAZolam (XANAX) 0.5 MG tablet        baclofen (LIORESAL) 20 MG tablet Current dose 20 mg BID. Start 10 mg in afternoon x 5-7 days, then increase to 20 mg TID. 90 tablet 1     bisacodyl (DULCOLAX) 5 MG EC tablet Take 1 tablet (5 mg) by mouth every other day Take every other day. If no bowel movement for 2 or more days, take 2 tablets of bisacodyl (10 mg) 30 tablet 2     buprenorphine HCl-naloxone HCl (SUBOXONE) 2-0.5 MG per film Place 0.5 Film under the tongue daily        busPIRone (BUSPAR) 15 MG tablet Take 15 mg by mouth 3 times daily       CONSTULOSE 10 GM/15ML solution TAKE 15 MLS (10 G) BY MOUTH 3 TIMES DAILY AS NEEDED FOR CONSTIPATION (AS NEEDED FOR SEVERE CONSTIPAITON, NO BM FOR MORE THAN 3 DAYS AND FEELING CONSTIPATED) 300 mL 3     cyclobenzaprine (FLEXERIL) 5 MG tablet TAKE 1-2 TABLETS (5-10 MG) BY MOUTH 3 TIMES DAILY AS NEEDED FOR MUSCLE SPASMS 90 tablet 5     DULoxetine (CYMBALTA) 30 MG capsule Take 30 mg in evening, along with 60 mg in morning. 90 capsule 1     DULoxetine (CYMBALTA) 60 MG capsule Take 1 capsule (60 mg) by mouth every morning 90 capsule 1     famotidine (PEPCID) 40 MG tablet Take 1 tablet (40 mg) by mouth 2 times daily 60 tablet 1     linaclotide (LINZESS) 72 MCG capsule Take 1 capsule (72 mcg) by mouth every morning (before breakfast) 30 capsule 1     ondansetron (ZOFRAN ODT) 4 MG ODT tab DISSOLVE ONE TABLET BY MOUTH EVERY 6 HOURS AS NEEDED FOR NAUSEA 20 tablet 1     pantoprazole (PROTONIX) 40 MG EC tablet TAKE ONE TABLET BY MOUTH TWICE A DAY WITH MEALS 60 tablet 5     rOPINIRole (REQUIP) 1 MG tablet TAKE ONE TABLET BY MOUTH EVERY NIGHT AT BEDTIME 90 tablet 0     simvastatin (ZOCOR) 10 MG tablet TAKE ONE TABLET BY MOUTH EVERY  NIGHT AT BEDTIME 90 tablet 1     zolpidem (AMBIEN) 5 MG tablet TAKE ONE TABLET (5 MG) BY MOUTH NIGHTLY AS NEEDED FOR SLEEP 30 tablet 1     cetirizine (ZYRTEC) 10 MG tablet TAKE ONE TABLET BY MOUTH EVERY MORNING (Patient not taking: Reported on 5/14/2024) 90 tablet 3     polyethylene glycol (MIRALAX) 17 GM/Dose powder Take 17 g by mouth daily (Patient not taking: Reported on 5/29/2024) 578 g 3     No current facility-administered medications for this visit.        Allergies   Allergen Reactions     Ergotamine-Caffeine      12-            GI problems-     Seasonal Allergies      Sumatriptan      vomits after giving herself a shot     Compazine Anxiety and Palpitations     Severe anxiety attack     Droperidol Anxiety and Palpitations     Severe anxiety attack     Nubain [Nalbuphine Hcl] Anxiety and Palpitations     Severe anxiety attack     Prochlorperazine Anxiety and Palpitations     Uncontrolled movement, severe anxiety attack     Social History     Socioeconomic History     Marital status: Single     Spouse name: Not on file     Number of children: Not on file     Years of education: Not on file     Highest education level: Not on file   Occupational History     Not on file   Tobacco Use     Smoking status: Every Day     Current packs/day: 0.25     Average packs/day: 0.3 packs/day for 20.0 years (5.0 ttl pk-yrs)     Types: Cigarettes     Passive exposure: Never     Smokeless tobacco: Never   Vaping Use     Vaping status: Former     Substances: Nicotine, CBD     Devices: Refillable tank   Substance and Sexual Activity     Alcohol use: Yes     Comment: Occasionaly     Drug use: No     Sexual activity: Not Currently     Partners: Male     Birth control/protection: Female Surgical   Other Topics Concern     Parent/sibling w/ CABG, MI or angioplasty before 65F 55M? No   Social History Narrative     Not on file     Social Determinants of Health     Financial Resource Strain: Low Risk  (1/8/2024)    Financial  Resource Strain      Within the past 12 months, have you or your family members you live with been unable to get utilities (heat, electricity) when it was really needed?: No   Food Insecurity: Low Risk  (2024)    Food Insecurity      Within the past 12 months, did you worry that your food would run out before you got money to buy more?: No      Within the past 12 months, did the food you bought just not last and you didn t have money to get more?: No   Transportation Needs: High Risk (2024)    Transportation Needs      Within the past 12 months, has lack of transportation kept you from medical appointments, getting your medicines, non-medical meetings or appointments, work, or from getting things that you need?: Yes   Physical Activity: Not on file   Stress: Not on file   Social Connections: Unknown (2022)    Received from [facility], [facility]    Social Connections      Frequency of Communication with Friends and Family: Not on file   Interpersonal Safety: Low Risk  (2023)    Interpersonal Safety      Do you feel physically and emotionally safe where you currently live?: Yes      Within the past 12 months, have you been hit, slapped, kicked or otherwise physically hurt by someone?: No      Within the past 12 months, have you been humiliated or emotionally abused in other ways by your partner or ex-partner?: No   Housing Stability: Low Risk  (2024)    Housing Stability      Do you have housing? : Yes      Are you worried about losing your housing?: No     Family History   Problem Relation Age of Onset     Depression Mother      Respiratory Mother      Chronic Obstructive Pulmonary Disease Mother      Hypertension Mother      Anxiety Disorder Mother      Cerebrovascular Disease Father         First stroke at age 28,  from 2nd when he was 55. Brain aneurysms.     Breast Cancer Cousin      Adrenal Disorder Other      Chronic  Obstructive Pulmonary Disease Other      Asthma Brother       ROS: 10 point ROS neg other than the symptoms noted above in the HPI.    PE:  B/P: 130/82, T: 95.8, P: Data Unavailable, R: Data Unavailable  General: well developed, well nourished WF who appears their stated age  HEENT: NC/AT, EOMI, (-)icterus, (-)injection  Neck: Supple, No JVD  Chest: CTA  Heart: S1, S2, (-)m/r/g  Abd: Soft, non tender, non distended, non tender, no masses, incarcerated subxiphoid hernia at port site  Rectal: No masses  Ext; Warm, no edema  Psych: AAOx3, anxious affect  Neuro: No focal deficits      CT -   CT ABDOMEN AND PELVIS WITH CONTRAST 2/17/2023 2:27 PM     CLINICAL HISTORY: Complains of lumps on abdomen umbilicus and  epigastrial incision for cholecystectomy (hernia?). Also, chronic  constipation. Poor bowel prep for colonoscopy; Constipation due to  opioid therapy; Abdominal mass, unspecified abdominal location.     TECHNIQUE: CT scan of the abdomen and pelvis was performed following  injection of IV contrast. Multiplanar reformats were obtained. Dose  reduction techniques were used.     CONTRAST: Isovue-370, 85mL     COMPARISON: CT abdomen and pelvis dated 7/6/2021.     FINDINGS:   LOWER CHEST: There is mild dependant atelectasis posterior bilateral  lower lungs. Minimal scattered bulla are noted in the visualized  portions of the lungs. There is minimal thickening of the wall of the  distal esophagus which could be due to inflammation from reflux.  Visualized portions of the lower chest are otherwise unremarkable.     HEPATOBILIARY: There is mild diffuse hepatic steatosis. No focal  intrahepatic lesion, biliary ductal dilatation, or  choledocholithiasis. Gallbladder is surgically absent.     PANCREAS: Normal.     SPLEEN: Calcified granulomata in the spleen are again noted and  correspond with chronic granulomatous disease. Spleen is otherwise  normal in appearance.     ADRENAL GLANDS: Normal.     KIDNEYS/BLADDER: Normal.      BOWEL: There is a moderate amount of stool in the colon. The colon is  otherwise normal in appearance. No pericolonic inflammatory change to  suggest acute diverticulitis. Appendix is normal in appearance without  evidence for appendicitis. Small bowel is grossly of normal caliber  and appearance. The stomach contains a moderate amount of fluid and a  small amount of other ingested material. It is otherwise unremarkable.     PELVIC ORGANS: Uterus is not seen consistent with prior hysterectomy.  Structure in the right adnexa could represent an atrophic right ovary  or a bowel loop. Ovaries are otherwise not well seen. No adnexal mass  is identified.     ADDITIONAL FINDINGS: No abnormal free fluid or free air is seen in the  peritoneal cavity. No adenopathy is identified. There is mild  nonaneurysmal atherosclerosis.     MUSCULOSKELETAL: Normal.                                                                      IMPRESSION:   1.  Moderate amount of stool is noted in the colon. The bowel is  otherwise grossly unremarkable without evidence for diverticulitis,  appendicitis, enteritis, colitis, or obstruction.  2.  Postop changes status post cholecystectomy and probable  hysterectomy. Possible oophorectomy.  3.  Mild diffuse hepatic steatosis.  4.  Evidence for granulomatous disease of the spleen.     WILTON HARGROVE MD     Images reviewed: Incarcerated fat hernia defect approximately FDC between the xiphoid and the umbilicus.      Impression/plan  This is a 48-year-old lady with a symptomatic incarcerated port site hernia in her upper abdomen.  I discussed the etiology of hernias as well as the role of smoking and smoking cessation.  After discussion the patient the plan at this time is for robotically assisted incisional hernia repair.   The procedure, risks, benefits, and alternatives were discussed and the patient agrees to proceed.  She will be scheduled in near future.  Continued efforts at smoking cessation  was strongly encouraged.    Michael Dozier MD, FACS      Again, thank you for allowing me to participate in the care of your patient.        Sincerely,        Michael Dozier MD

## 2024-08-06 ENCOUNTER — HOSPITAL ENCOUNTER (OUTPATIENT)
Dept: ULTRASOUND IMAGING | Facility: CLINIC | Age: 49
Discharge: HOME | End: 2024-08-06
Attending: OBSTETRICS & GYNECOLOGY
Payer: COMMERCIAL

## 2024-08-06 ENCOUNTER — HOSPITAL ENCOUNTER (OUTPATIENT)
Dept: MAMMOGRAPHY | Facility: CLINIC | Age: 49
Discharge: HOME | End: 2024-08-06
Attending: OBSTETRICS & GYNECOLOGY
Payer: COMMERCIAL

## 2024-08-06 ENCOUNTER — TELEPHONE (OUTPATIENT)
Dept: SURGERY | Facility: CLINIC | Age: 49
End: 2024-08-06
Payer: COMMERCIAL

## 2024-08-06 DIAGNOSIS — N64.4 MASTALGIA: ICD-10-CM

## 2024-08-06 PROCEDURE — 77062 BREAST TOMOSYNTHESIS BI: CPT

## 2024-08-06 PROCEDURE — 76642 ULTRASOUND BREAST LIMITED: CPT | Mod: RT

## 2024-08-06 NOTE — TELEPHONE ENCOUNTER
Type of surgery: HERNIORRHAPHY, INCISIONAL, ROBOT-ASSISTED, LAPAROSCOPIC, USING DA JERRELL XI   Location of surgery: [facility] OR  Date and time of surgery: 8/30  Surgeon: Jacoby  Pre-Op Appt Date: 8/19  Post-Op Appt Date: 9/12   Packet sent out: Yes  Pre-cert/Authorization completed:  Not Applicable  Date: na

## 2024-08-07 ENCOUNTER — TELEPHONE (OUTPATIENT)
Dept: GASTROENTEROLOGY | Facility: CLINIC | Age: 49
End: 2024-08-07
Payer: COMMERCIAL

## 2024-08-07 NOTE — TELEPHONE ENCOUNTER
Pre assessment completed for upcoming procedure.   (Please see previous telephone encounter notes for complete details)    Patient  returned call.       Procedure details:    Arrival time and facility location reviewed.    Pre op exam needed? No.    Designated  policy reviewed. Instructed to have someone stay 24  hours post procedure.       Medication review:    Medications reviewed. Please see supporting documentation below. Holding recommendations discussed (if applicable).       Prep for procedure:     Procedure prep instructions reviewed.        Any additional information needed:  N/A      Patient  verbalized understanding and had no questions or concerns at this time.      Phuong Tejada RN  Endoscopy Procedure Pre Assessment   [phone] option 4

## 2024-08-07 NOTE — TELEPHONE ENCOUNTER
Pt calling to schedule #2 MBB          Ralph Poon  Complex   [facility]  Pain Management   Screening questions for MBB Injections:    Injection to be done at which interventional clinic site? [facility] [place]    Procedure ordered by Dr. Landrum    Procedure ordered? Cervical Medial Branch Block    What insurance would patient like us to bill for this procedure? BC    MEDICA: REQUIRES A PA FOR BOTH MBB   Worker's comp- Any injection DO NOT SCHEDULE and route to Ralph Poon.    [employer] insurance - If scheduling an SI joint injection DO NOT SCHEDULE and route to Georgiana Crespo.     MBBs must be scheduled with elapsed time interval of at least 2 weeks and not more than 6 months between the First MBB and the Second MBB for insurance purposes     [employer] - Any injection besides hip/shoulder/knee joint DO NOT SCHEDULE and route to Georgiana Crespo. She will obtain PA and call pt back to schedule procedure or notify pt of denial.     HP [employer]- PA required for all MBB's    **BCBS- ALL need to be routed to Georgiana for review if a PA is needed**  IF SCHEDULING IN [facility] OR [facility]INE PLEASE SCHEDULE AT LEAST 7-10         BUSINESS DAYS OUT SO A PA CAN BE OBTAINED    Genicular Nerve blocks- ALL insurances except for- [employer], Medicare (straight not supplement) and [employer]. Need to be reviewed by Georgiana before scheduling.       Is patient scheduled at [facility]? no   If YES, route every encounter to [facility] CARE NAVIGATION POOL [[phone]]    Any chance of pregnancy? NO   If YES, do NOT schedule and route to RN olaf  - Dr. Wang route to Peyton Estes and PM&R Nurse  [[id number]]      Is an  needed? No     Patient has a drive home? (mandatory) Yes     Is patient taking any blood thinners (plavix, coumadin, jantoven, warfarin, heparin, pradaxa or dabigatran )? No    If hold needed, do NOT schedule, route to RN pool/ Dr. Wang's Team      Does the patient have a bleeding or clotting disorder? No  If YES, okay to schedule AND route to RN nurse olaf/ Dr. Wang's Team  **For any patients with platelet count <100, must be forwarded to provider**    Is patient diabetic? no If YES, have them bring their glucometer.    Does patient have an active infection or treated for one within the past week? No    Is patient currently taking any antibiotics?  No  For patients on chronic, preventative, or prophylactic antibiotics, procedures may be scheduled.   For patients on antibiotics for active or recent infection:antibiotic course must have been completed for 4 days    Is patient currently taking any steroid medications? (i.e. Prednisone, Medrol)  No   For patients on steroid medications, course must have been completed for 4 days    Is patient actively being treated for cancer or immunocompromised? No   If YES, do NOT schedule and route to CONG/ Dr. Zapiens Team    Are you able to get on and off an exam table with minimal or no assistance? Yes   If NO, do NOT schedule and route to CONG/ Dr. Zapiens Team    Are you able to roll over and lay on your stomach with minimal or no assistance? Yes   If NO, do NOT schedule and route to RN/ Dr. Zapiens Team    Any allergies to contrast dye, iodine, shellfish, or numbing and steroid medications? No  (If so, inform nursing and note in scheduling comments.)    Allergies: Ergotamine-caffeine, Seasonal allergies, Sumatriptan, Compazine, Droperidol, Nubain [nalbuphine hcl], and Prochlorperazine     Does patient have an MRI/CT?  Not Applicable  Check Procedure Scheduling Grid to see if required.    Was the MRI done within the last 3 years?  NA  If yes, [facility], [facility], [facility] etc?     If no, do not schedule and route to nursing/ Dr. Wang's Team  If MRI was not done at [facility], [facility] or [facility] do NOT schedule and route to nursing.    If pt has an imaging disc, the  injection MAY be scheduled but pt has to bring disc to appt.   If they show up without the disc the injection cannot be done    Is patient able to transfer to a procedure table with minimal or no assistance? Yes  If NO, do NOT schedule and route to RN/ Dr. Wang's Team    Medial Branch Block Pre-Procedure Instructions  It is okay to take long acting pain medications (if you are on them) the day of the procedure but try not to take any short acting medications unless absolutely necessary.    YES: ok   Long acting meds would include: Gabapentin (Neurontin), MS Contin, Oxycontin        Short acting meds would include:  Percocet, Oxycodone, Vicodin, Ibuprofen   The day of the procedure, you should try to do things that provoke your pain, since the injection is being done to see if it will relieve your pain . YES: ok   If your pain level is a 4 out of 10 or less on the day of the procedu re, please call [phone] to reschedule.  YES: ok     Reminders:    If you are started on any steroids or antibiotics between now and your appointment, you must contact us because it may affect our ability to perform your procedure ok    Instructed pt to arrive 30 minutes early for IV start if required. (Check Procedure Scheduling Grid) SAMMY     If this is for a cervical MBB aspirin needs to be held for 6 days.  NO     Do not schedule procedures requiring IV placement in the first appointment of the day or first appointment after lunch. Do NOT schedule at 0745, 0815 or 1245.  ok    For patients 85 or older we recommend having an adult stay w/ them for the remainder of the day.      Does the patient have any questions? ok

## 2024-08-07 NOTE — TELEPHONE ENCOUNTER
Called the Pt to complete Pre-Assessment. First Attempt. No answer, Left message.       Procedure details:    Patient scheduled for Upper endoscopy (EGD) with BRAVO capsule placement on 8/21/24.     Arrival time: 1345. Procedure time 1430    Facility location: [facility]; [address], [place] [address]. Check in location: 2nd Floor at Surgery desk.    Sedation type: MAC    Pre op exam needed? No.    Indication for procedure:   Gastroesophageal reflux disease with esophagitis without hemorrhage [K21.00]      Atypical chest pain [R07.89]            Chart review:     Electronic implanted devices? No    Recent diagnosis of diverticulitis within the last 6 weeks? No      Medication review:    Diabetic? No    Anticoagulants? No    Weight loss medication/injectable? No GLP 1 medications per patient's medication list.  RN will verify with pre-assessment call.    NSAIDS? No NSAID medications per patient's medication list.  RN will verify with pre-assessment call.    Other medication HOLDING recommendations:  BRAVO: PPIs/Acid reducing medication(s): HOLD 2 weeks before procedure.      Prep for procedure:     Prep instructions sent via [employer]         Cathy Cunningham RN  Endoscopy Procedure Pre Assessment RN  [phone] option 4

## 2024-08-08 NOTE — TELEPHONE ENCOUNTER
Do not note that pain diary from MBB#1 was received per chart review.    Pt had a bilateral Cervical  medial branch block # 1 on 6/27/2024.  The post medial branch block form was not received.   Patient states that due to medication given for anxiety she went home following block procedure and went to sleep and had forgotten about form.      According to pain diary pt would like to proceed with medial branch block #2.  Max relief from block is:    Pt had little on the day of the block. (0 to 50%)  Physical therapy:    Last done in?:  2024.   How many sessions?:  >4.    Where was it  done?  [facility].    If outside location does a release of information need to be signed? N/A   If pt has not done PT does it needs to be ordered/started in order to have the radiofrequency ablation? N/A.    Patient has been scheduled for CMBB #2 on 9/10/2024 already.    Routing to Georgiana.      Reema Gaffney RN

## 2024-08-08 NOTE — TELEPHONE ENCOUNTER
No PA required, okay to schedule.     I do not see any pain diary scores, routing to advise.       Please route to schedule if okay to proceed        Georgiana Martinez   [facility]

## 2024-08-09 NOTE — TELEPHONE ENCOUNTER
[employer] MA-   6 months failed conservative treatment (meds, muscle relaxants, etc.);   4 weeks of PT within the last 6 months (or an unfavorable evaluation);   No spinal fusion of level being treated; non-radicular pain documented in radiographic evaluation within last 12 months;   2 successful workups resulting in >80% pain relief.        Routing to review and advise      Georgiana Martinez   [facility]

## 2024-08-13 ENCOUNTER — VIRTUAL VISIT (OUTPATIENT)
Dept: PALLIATIVE MEDICINE | Facility: CLINIC | Age: 49
End: 2024-08-13
Payer: COMMERCIAL

## 2024-08-13 ENCOUNTER — MYC MEDICAL ADVICE (OUTPATIENT)
Dept: PALLIATIVE MEDICINE | Facility: CLINIC | Age: 49
End: 2024-08-13

## 2024-08-13 DIAGNOSIS — M79.18 MYOFASCIAL PAIN: ICD-10-CM

## 2024-08-13 DIAGNOSIS — G89.29 CHRONIC BILATERAL LOW BACK PAIN WITH BILATERAL SCIATICA: ICD-10-CM

## 2024-08-13 DIAGNOSIS — M54.2 CHRONIC NECK PAIN: ICD-10-CM

## 2024-08-13 DIAGNOSIS — G89.29 CHRONIC NECK PAIN: ICD-10-CM

## 2024-08-13 DIAGNOSIS — M79.641 BILATERAL HAND PAIN: ICD-10-CM

## 2024-08-13 DIAGNOSIS — G89.29 CHRONIC BILATERAL THORACIC BACK PAIN: ICD-10-CM

## 2024-08-13 DIAGNOSIS — M47.812 ARTHROPATHY OF CERVICAL FACET JOINT: Primary | ICD-10-CM

## 2024-08-13 DIAGNOSIS — M54.41 CHRONIC BILATERAL LOW BACK PAIN WITH BILATERAL SCIATICA: ICD-10-CM

## 2024-08-13 DIAGNOSIS — M54.6 CHRONIC BILATERAL THORACIC BACK PAIN: ICD-10-CM

## 2024-08-13 DIAGNOSIS — M79.642 BILATERAL HAND PAIN: ICD-10-CM

## 2024-08-13 DIAGNOSIS — M54.42 CHRONIC BILATERAL LOW BACK PAIN WITH BILATERAL SCIATICA: ICD-10-CM

## 2024-08-13 PROCEDURE — 96159 HLTH BHV IVNTJ INDIV EA ADDL: CPT | Mod: 95 | Performed by: PSYCHOLOGIST

## 2024-08-13 PROCEDURE — 96158 HLTH BHV IVNTJ INDIV 1ST 30: CPT | Mod: 95 | Performed by: PSYCHOLOGIST

## 2024-08-13 ASSESSMENT — ANXIETY QUESTIONNAIRES
5. BEING SO RESTLESS THAT IT IS HARD TO SIT STILL: MORE THAN HALF THE DAYS
GAD7 TOTAL SCORE: 19
8. IF YOU CHECKED OFF ANY PROBLEMS, HOW DIFFICULT HAVE THESE MADE IT FOR YOU TO DO YOUR WORK, TAKE CARE OF THINGS AT HOME, OR GET ALONG WITH OTHER PEOPLE?: EXTREMELY DIFFICULT
5. BEING SO RESTLESS THAT IT IS HARD TO SIT STILL: MORE THAN HALF THE DAYS
GAD7 TOTAL SCORE: 19
GAD7 TOTAL SCORE: 19
7. FEELING AFRAID AS IF SOMETHING AWFUL MIGHT HAPPEN: NEARLY EVERY DAY
3. WORRYING TOO MUCH ABOUT DIFFERENT THINGS: NEARLY EVERY DAY
GAD7 TOTAL SCORE: 19
1. FEELING NERVOUS, ANXIOUS, OR ON EDGE: NEARLY EVERY DAY
2. NOT BEING ABLE TO STOP OR CONTROL WORRYING: NEARLY EVERY DAY
IF YOU CHECKED OFF ANY PROBLEMS ON THIS QUESTIONNAIRE, HOW DIFFICULT HAVE THESE PROBLEMS MADE IT FOR YOU TO DO YOUR WORK, TAKE CARE OF THINGS AT HOME, OR GET ALONG WITH OTHER PEOPLE: EXTREMELY DIFFICULT
GAD7 TOTAL SCORE: 19
7. FEELING AFRAID AS IF SOMETHING AWFUL MIGHT HAPPEN: NEARLY EVERY DAY
1. FEELING NERVOUS, ANXIOUS, OR ON EDGE: NEARLY EVERY DAY
4. TROUBLE RELAXING: NEARLY EVERY DAY
2. NOT BEING ABLE TO STOP OR CONTROL WORRYING: NEARLY EVERY DAY
7. FEELING AFRAID AS IF SOMETHING AWFUL MIGHT HAPPEN: NEARLY EVERY DAY
6. BECOMING EASILY ANNOYED OR IRRITABLE: MORE THAN HALF THE DAYS
IF YOU CHECKED OFF ANY PROBLEMS ON THIS QUESTIONNAIRE, HOW DIFFICULT HAVE THESE PROBLEMS MADE IT FOR YOU TO DO YOUR WORK, TAKE CARE OF THINGS AT HOME, OR GET ALONG WITH OTHER PEOPLE: EXTREMELY DIFFICULT
6. BECOMING EASILY ANNOYED OR IRRITABLE: MORE THAN HALF THE DAYS
7. FEELING AFRAID AS IF SOMETHING AWFUL MIGHT HAPPEN: NEARLY EVERY DAY
4. TROUBLE RELAXING: NEARLY EVERY DAY
3. WORRYING TOO MUCH ABOUT DIFFERENT THINGS: NEARLY EVERY DAY
8. IF YOU CHECKED OFF ANY PROBLEMS, HOW DIFFICULT HAVE THESE MADE IT FOR YOU TO DO YOUR WORK, TAKE CARE OF THINGS AT HOME, OR GET ALONG WITH OTHER PEOPLE?: EXTREMELY DIFFICULT
GAD7 TOTAL SCORE: 19

## 2024-08-13 NOTE — PROGRESS NOTES
Radha Beckwith is a 48 year old female who is being evaluated via a billable video visit.      Patient is currently in the [facility]? yes    Patient would like the video invitation sent by: Text to cell phone: [phone]}    Video Start Time: 11:02 AM  Video Stop Time: 11:58 AM    Additional provider notes:     Pain Diagnoses per pain provider:   Arthropathy of cervical facet joint     Chronic neck pain     Myofascial pain     Chronic bilateral thoracic back pain     Chronic bilateral low back pain with bilateral sciatica     Bilateral hand pain                    DATA: During today's visit you reported the following: Your chronic pain is unchanged, 'I wake up every morning with muscle spasms in my back'. Your mood is 'not that great either' - report pain is primary trigger. Your activity level is reduced - not engaging in household tasks 'I just don't care right now'. Your stress level is unchanged. Your sleep is more disrupted by vivid dreams since starting Temazepam. You reported engaging in self-care for your pain infrequently.    You identified that you would like to focus on the following or had questions regarding the following issues or concerns, and we discussed the following:   - report 'it has been a tough couple of weeks'  - still working on 2nd MBB   - BRAVO test - off medications until that is completed, uncomfortable  - scheduled ADHD testing in February  - report increased fatigue and feeling tired lately - discussed this could be related to vivid dreams since starting Temazepam  - discussed it would be helpful to let psychiatric medication prescriber know about SE of Temazepam  - working on appeal for SSDI  - discussed window of tolerance  - suggest either SpoonieDay or Bearable jn - both could help you better understand how pain impacts your ability to complete tasks you feel you 'should' be completing, and can provide others   - continuing to work with individual therapist on  addressing communication concerns with partner    ASSESSMENT: Linette reports she is feeling more down as a result of pain interfering in her ability to engage in self-care as often as she is able. We discussed challenging internalized beliefs and negative self-talk, and instead focusing on increased tolerance for engaging in self-care.    PLAN:   Your next appointment is scheduled for 8/27 at 11:00 AM.  Assignment/Objectives /interventions for next session:   - download Digitingy or Bearable jn to track symptoms  - try to engage in self-care activities 1-2 times daily as able    We believe regular attendance is key to your success in our program!    Any time you are unable to keep your appointment we ask that you call us at [phone] at least 24 hours in advance to cancel.This will allow us to offer the appointment time to another patient.   Multiple missed appointments may lead to dismissal from the clinic.    Telemedicine Visit: The patient's condition can be safely assessed and treated via synchronous audio and visual telemedicine encounter.      Reason for Telemedicine Visit: Services only offered telehealth    Originating Site (Patient Location): Patient's home    Distant Site (Provider Location): Provider Remote Setting- Home Office    Consent:  The patient/guardian has verbally consented to: the potential risks and benefits of telemedicine (video visit) versus in person care; bill my insurance or make self-payment for services provided; and responsibility for payment of non-covered services.     Mode of Communication:  Video Conference via BioArray    As the provider I attest to compliance with applicable laws and regulations related to telemedicine.      Karen Kohler PsyD LP  Licensed Psychologist  Outpatient Clinic Therapist  [facility]

## 2024-08-14 ENCOUNTER — VIRTUAL VISIT (OUTPATIENT)
Dept: PSYCHOLOGY | Facility: CLINIC | Age: 49
End: 2024-08-14
Payer: COMMERCIAL

## 2024-08-14 DIAGNOSIS — F33.1 MODERATE EPISODE OF RECURRENT MAJOR DEPRESSIVE DISORDER (H): Primary | ICD-10-CM

## 2024-08-14 DIAGNOSIS — F41.1 GENERALIZED ANXIETY DISORDER: ICD-10-CM

## 2024-08-14 PROCEDURE — 90834 PSYTX W PT 45 MINUTES: CPT | Mod: 95 | Performed by: COUNSELOR

## 2024-08-14 NOTE — TELEPHONE ENCOUNTER
Chart reviewed - to clarify, she did not appreciate any benefit the day of procedure or unable to quantify due to anxiolytic administered the day of procedure?    Jenny Landrum, DNP, APRN, AGNP-C  [facility]

## 2024-08-14 NOTE — PROGRESS NOTES
Answers submitted by the patient for this visit:  Patient Health Questionnaire (Submitted on 8/13/2024)  If you checked off any problems, how difficult have these problems made it for you to do your work, take care of things at home, or get along with other people?: Extremely difficult  PHQ9 TOTAL SCORE: 23  BO-7 (Submitted on 8/13/2024)  BO 7 TOTAL SCORE: 19          [facility]                                     Progress Note    Patient Name: Radha Beckwith  Date: 8/14/24         Service Type: Individual      Session Start Time: 2:00pm Session End Time: 2:50pm     Session Length:  50    Session #: 47    Attendees: Client    Service Modality:  Telephone      Provider verified identity through the following two step process.  Patient provided:  Patient is known previously to provider    Telemedicine Visit: The patient's condition can be safely assessed and treated via synchronous audio and visual telemedicine encounter.      Reason for Telemedicine Visit: Patient convenience (e.g. access to timely appointments / distance to available provider)    Originating Site (Patient Location): Patient's home    Distant Site (Provider Location): Provider Remote Setting- Home Office    Consent:  The patient/guardian has verbally consented to: the potential risks and benefits of telemedicine (video visit) versus in person care; bill my insurance or make self-payment for services provided; and responsibility for payment of non-covered services.     Patient would like the video invitation sent by:  My Chart    Mode of Communication:  telephone    Distant Location (Provider):  On-site    As the provider I attest to compliance with applicable laws and regulations related to telemedicine.    DATA  Interactive Complexity: No  Crisis: No        Progress Since Last Session (Related to Symptoms / Goals / Homework):   Symptoms: No change .    Homework: Completed in session      Episode of Care Goals: Satisfactory  progress - MAINTENANCE (Working to maintain change, with risk of relapse); Intervened by continuing to positively reinforce healthy behavior choice      Current / Ongoing Stressors and Concerns:   The client stated she had to to taking her GERD medicine for a procedure next week. Because of it she's had to have a lot of dairy/calcium to compensate. Hasn't been feeling well because of this too.        Treatment Objective(s) Addressed in This Session:   Increase interest, engagement, and pleasure in doing things  Feel less tired and more energy during the day        Intervention:   Talked about the upcoming medical appointments and any fears around them. Talked about things that have been going well. Continued talk about how to improve her relationship with her boyfriend. Continued work on self care skills.     Assessments completed prior to visit:  The following assessments were completed by patient for this visit:  PHQ9:       6/12/2024     4:05 PM 6/20/2024    12:32 AM 7/3/2024     1:59 PM 7/11/2024     9:40 AM 7/25/2024    11:51 AM 8/4/2024     7:19 PM 8/13/2024     5:34 PM   PHQ-9 SCORE   PHQ-9 Total Score MyChart 21 (Severe depression) 22 (Severe depression) 23 (Severe depression) 22 (Severe depression) 21 (Severe depression) 21 (Severe depression) 23 (Severe depression)   PHQ-9 Total Score 21 22 23 22 21 21 23     GAD7:       4/17/2024     1:23 PM 5/2/2024     1:11 PM 5/23/2024     5:47 AM 6/12/2024     4:08 PM 7/1/2024     2:51 PM 7/25/2024    11:52 AM 8/13/2024     5:35 PM   BO-7 SCORE   Total Score 21 (severe anxiety) 21 (severe anxiety) 21 (severe anxiety) 21 (severe anxiety) 19 (severe anxiety) 21 (severe anxiety) 19 (severe anxiety)   Total Score 21    21 21    21 21 21 19 21 19    19     PROMIS 10-Global Health (all questions and answers displayed):       3/27/2024     8:53 PM 4/2/2024     4:38 PM 4/10/2024     1:58 PM 4/17/2024     1:28 PM 7/25/2024    11:56 AM 8/4/2024     7:20 PM 8/10/2024     2:51  PM   PROMIS 10   In general, would you say your health is: Poor Poor Poor Poor Poor Poor Fair   In general, would you say your quality of life is: Poor Poor Poor Poor Fair Poor Poor   In general, how would you rate your physical health? Poor Poor Poor Poor Poor Poor Poor   In general, how would you rate your mental health, including your mood and your ability to think? Poor Poor Poor Poor Poor Poor Poor   In general, how would you rate your satisfaction with your social activities and relationships? Poor Poor Poor Poor Poor Fair Poor   In general, please rate how well you carry out your usual social activities and roles Poor Poor Fair Poor Poor Poor Poor   To what extent are you able to carry out your everyday physical activities such as walking, climbing stairs, carrying groceries, or moving a chair? A little A little A little A little A little Moderately A little   In the past 7 days, how often have you been bothered by emotional problems such as feeling anxious, depressed, or irritable? Always Always Always Always Always Always Always   In the past 7 days, how would you rate your fatigue on average? Severe Severe Severe Very severe Severe Severe Very severe   In the past 7 days, how would you rate your pain on average, where 0 means no pain, and 10 means worst imaginable pain? 7 7 7 7 7 7 7   In general, would you say your health is: 1 1 1 1 1 1 2   In general, would you say your quality of life is: 1 1 1 1 2 1 1   In general, how would you rate your physical health? 1 1 1 1 1 1 1   In general, how would you rate your mental health, including your mood and your ability to think? 1 1 1 1 1 1 1   In general, how would you rate your satisfaction with your social activities and relationships? 1 1 1 1 1 2 1   In general, please rate how well you carry out your usual social activities and roles. (This includes activities at home, at work and in your community, and responsibilities as a parent, child, spouse, employee,  friend, etc.) 1 1 2 1 1 1 1   To what extent are you able to carry out your everyday physical activities such as walking, climbing stairs, carrying groceries, or moving a chair? 2 2 2 2 2 3 2   In the past 7 days, how often have you been bothered by emotional problems such as feeling anxious, depressed, or irritable? 5 5 5 5 5 5 5   In the past 7 days, how would you rate your fatigue on average? 4 4 4 5 4 4 5   In the past 7 days, how would you rate your pain on average, where 0 means no pain, and 10 means worst imaginable pain? 7 7 7 7 7 7 7   Global Mental Health Score 4 4 4    4 4    4 5    5 5 4    4   Global Physical Health Score 7 7 7    7 6    6 7    7 8 6    6   PROMIS TOTAL - SUBSCORES 11 11 11    11 10    10 12    12 13 10    10     [place] Suicide Severity Rating Scale (Lifetime/Recent)      10/4/2022    11:00 AM 5/13/2024     3:13 PM   [place] Suicide Severity Rating (Lifetime/Recent)   Q1 Wished to be Dead (Past Month) no 0-->no   Q2 Suicidal Thoughts (Past Month) no 0-->no   Q3 Suicidal Thought Method no    Q4 Suicidal Intent without Specific Plan no    Q5 Suicide Intent with Specific Plan yes    Q6 Suicide Behavior (Lifetime) yes 0-->no   If yes to Q6, within past 3 months? no    Level of Risk per Screen high risk no risks indicated         ASSESSMENT: Current Emotional / Mental Status (status of significant symptoms):   Risk status (Self / Other harm or suicidal ideation)   Patient denies current fears or concerns for personal safety.   Patient denies current or recent suicidal ideation or behaviors.   Patient denies current or recent homicidal ideation or behaviors.   Patient denies current or recent self injurious behavior or ideation.   Patient denies other safety concerns.   Patient reports there has been no change in risk factors since their last session.     Patient reports there has been no change in protective factors since their last session.     Recommended that patient call 911 or go to  the local ED should there be a change in any of these risk factors.     Appearance:   appropriate    Eye Contact:   good    Psychomotor Behavior: Normal    Attitude:   Cooperative    Orientation:   All   Speech    Rate / Production: Normal/ Responsive Normal     Volume:  Normal    Mood:    Normal   Affect:    Normal    Thought Content:  Clear  Rumination    Thought Form:  Coherent    Insight:    Good      Medication Review:   No changes to current psychiatric medication(s)     Medication Compliance:   Yes     Changes in Health Issues:   None reported     Chemical Use Review:   Substance Use: Chemical use reviewed, no active concerns identified      Tobacco Use: No change in amount of tobacco use since last session.  Patient declined discussion at this time    Diagnosis:  1. Moderate episode of recurrent major depressive disorder (H)    2. Generalized anxiety disorder            Collateral Reports Completed:   Not Applicable    PLAN: (Patient Tasks / Therapist Tasks / Other)  Continue to continue to work on self care and communication skills.         Ricarda Bhatia [facility]                                                         ______________________________________________________________________    Individual Treatment Plan    Patient's Name: Radha Beckwith  YOB: 1975    Date of Creation: 6/28/23  Date Treatment Plan Last Reviewed/Revised: 8/14/24    DSM5 Diagnoses: 296.32 (F33.1) Major Depressive Disorder, Recurrent Episode, Moderate _  Psychosocial / Contextual Factors: living with boyfriend, memory issues  PROMIS (reviewed every 90 days):     Referral / Collaboration:  Referral to another professional/service is not indicated at this time..    Anticipated number of session for this episode of care: 9-12 sessions  Anticipation frequency of session:  as needed  Anticipated Duration of each session: 38-52 minutes  Treatment plan will be reviewed in 90 days or when goals have been changed.        MeasurableTreatment Goal(s) related to diagnosis / functional impairment(s)  Goal 1: Patient will increase communication skills with family members.    Objective #A (Patient Action)    Patient will learn & utilize at least 2 assertive communication skills weekly.  Status: Continued - Date(s): 8/14/24    Intervention(s)  Therapist will teach emotional regulation skills. distress tolerance skills, interpersonal effectiveness skills, emotion regluation skills, mindfulness skills, radical acceptance. Therapist will teach client how to ID body cues for anxiety, anxiety reduction techniques, how to ID triggers for depression and anxiety- decrease reactivity/eliminate, lifestyle changes to reduce depression and anxiety, communication skills, explore cognitive beliefs and help client to develop healthy cognitive patterns and beliefs.    Objective #B  Patient will use thought-stopping strategy daily to reduce intrusive thoughts.  Status: Continued - Date(s): 8/14/24    Intervention(s)  Therapist will teach emotional regulation skills. distress tolerance skills, interpersonal effectiveness skills, emotion regluation skills, mindfulness skills, radical acceptance. Therapist will teach client how to ID body cues for anxiety, anxiety reduction techniques, how to ID triggers for depression and anxiety- decrease reactivity/eliminate, lifestyle changes to reduce depression and anxiety, communication skills, explore cognitive beliefs and help client to develop healthy cognitive patterns and beliefs.      Goal 2: Patient will decrease depression symptoms.      Objective #A (Patient Action)    Status: Continued - Date(s): 8/14/24    Patient will Increase interest, engagement, and pleasure in doing things  Decrease frequency and intensity of feeling down, depressed, hopeless  Improve quantity and quality of night time sleep / decrease daytime naps  Feel less tired and more energy during the day   Improve diet, appetite, mindful  eating, and / or meal planning  Identify negative self-talk and behaviors: challenge core beliefs, myths, and actions  Improve concentration, focus, and mindfulness in daily activities   Feel less fidgety, restless or slow in daily activities / interpersonal interactions.    Intervention(s)  Therapist will teach emotional regulation skills. distress tolerance skills, interpersonal effectiveness skills, emotion regluation skills, mindfulness skills, radical acceptance. Therapist will teach client how to ID body cues for anxiety, anxiety reduction techniques, how to ID triggers for depression and anxiety- decrease reactivity/eliminate, lifestyle changes to reduce depression and anxiety, communication skills, explore cognitive beliefs and help client to develop healthy cognitive patterns and beliefs.    Objective #B  Patient will identify three distraction and diversion activities and use those activities to decrease level of anxiety  .    Status: Continued - Date(s):  8/14/24    Intervention(s)  Therapist will teach emotional regulation skills. distress tolerance skills, interpersonal effectiveness skills, emotion regluation skills, mindfulness skills, radical acceptance. Therapist will teach client how to ID body cues for anxiety, anxiety reduction techniques, how to ID triggers for depression and anxiety- decrease reactivity/eliminate, lifestyle changes to reduce depression and anxiety, communication skills, explore cognitive beliefs and help client to develop healthy cognitive patterns and beliefs.      Patient has reviewed and agreed to the above plan.      Ricarda Bhatia [facility]

## 2024-08-14 NOTE — TELEPHONE ENCOUNTER
Incoming call received from Pt asking if there is anything she can do to help with her acid reflux as she is having a hard time since having tor stop for her upcoming [employer] placement. Pt was advised that she should not be taking any acid reducing medications for two weeks leading up to her exam.  Pt was educated that this is to make sure that the data that the [employer] gathers is not being affected by any medications.  Pt stated that makes sense and she appreciated the explanation.      No further questions or concerns at this time.      Isabelle Srivastava RN

## 2024-08-15 NOTE — TELEPHONE ENCOUNTER
FYI to provider.        ----------------Mychart Below from pt----------------  I just wanted to make you aware that I had a recent visit with Clare Carmona (GYN) and that she diagnosed me with Bilateral Mastalgia and wants me to see a Physical Therapist for US Pelvic Transabdominal & Transvaginal pain.  Just a few more to add to my list...   Regards,   Linette Beckwith     --------------Mychart below response to pt----------------  Zev Sue,     Appreciate the update. I will make sure that Karen Kohler is aware for your upcoming appointment (08.27.24).     Take care!    Milena CHONG, RN Care Coordinator  [facility]  Pain Management     Detail Level: Detailed

## 2024-08-16 ENCOUNTER — ANESTHESIA EVENT (OUTPATIENT)
Dept: SURGERY | Facility: AMBULATORY SURGERY CENTER | Age: 49
End: 2024-08-16
Payer: COMMERCIAL

## 2024-08-19 ENCOUNTER — VIRTUAL VISIT (OUTPATIENT)
Dept: PSYCHOLOGY | Facility: CLINIC | Age: 49
End: 2024-08-19
Payer: COMMERCIAL

## 2024-08-19 ENCOUNTER — OFFICE VISIT (OUTPATIENT)
Dept: FAMILY MEDICINE | Facility: CLINIC | Age: 49
End: 2024-08-19
Payer: COMMERCIAL

## 2024-08-19 VITALS
WEIGHT: 168.4 LBS | TEMPERATURE: 97.5 F | HEART RATE: 80 BPM | SYSTOLIC BLOOD PRESSURE: 123 MMHG | HEIGHT: 64 IN | BODY MASS INDEX: 28.75 KG/M2 | DIASTOLIC BLOOD PRESSURE: 85 MMHG | RESPIRATION RATE: 18 BRPM | OXYGEN SATURATION: 98 %

## 2024-08-19 DIAGNOSIS — G89.29 CHRONIC BILATERAL THORACIC BACK PAIN: ICD-10-CM

## 2024-08-19 DIAGNOSIS — Z72.0 TOBACCO ABUSE: ICD-10-CM

## 2024-08-19 DIAGNOSIS — K59.03 CONSTIPATION DUE TO OPIOID THERAPY: ICD-10-CM

## 2024-08-19 DIAGNOSIS — F41.9 ANXIETY: ICD-10-CM

## 2024-08-19 DIAGNOSIS — M54.41 CHRONIC BILATERAL LOW BACK PAIN WITH BILATERAL SCIATICA: ICD-10-CM

## 2024-08-19 DIAGNOSIS — F11.21 OPIOID DEPENDENCE IN REMISSION (H): ICD-10-CM

## 2024-08-19 DIAGNOSIS — M54.42 CHRONIC BILATERAL LOW BACK PAIN WITH BILATERAL SCIATICA: ICD-10-CM

## 2024-08-19 DIAGNOSIS — R11.0 NAUSEA: ICD-10-CM

## 2024-08-19 DIAGNOSIS — M54.2 CHRONIC NECK PAIN: ICD-10-CM

## 2024-08-19 DIAGNOSIS — F41.1 GENERALIZED ANXIETY DISORDER: ICD-10-CM

## 2024-08-19 DIAGNOSIS — Z79.899 MEDICAL CANNABIS USE: ICD-10-CM

## 2024-08-19 DIAGNOSIS — G89.29 CHRONIC NECK PAIN: ICD-10-CM

## 2024-08-19 DIAGNOSIS — K43.0 INCARCERATED INCISIONAL HERNIA: ICD-10-CM

## 2024-08-19 DIAGNOSIS — F32.5 MAJOR DEPRESSION IN REMISSION (H): ICD-10-CM

## 2024-08-19 DIAGNOSIS — M79.18 MYOFASCIAL PAIN: ICD-10-CM

## 2024-08-19 DIAGNOSIS — E87.6 HYPOKALEMIA: ICD-10-CM

## 2024-08-19 DIAGNOSIS — K21.9 GASTROESOPHAGEAL REFLUX DISEASE WITHOUT ESOPHAGITIS: ICD-10-CM

## 2024-08-19 DIAGNOSIS — T40.2X5A CONSTIPATION DUE TO OPIOID THERAPY: ICD-10-CM

## 2024-08-19 DIAGNOSIS — G89.29 CHRONIC BILATERAL LOW BACK PAIN WITH BILATERAL SCIATICA: ICD-10-CM

## 2024-08-19 DIAGNOSIS — Z01.818 PREOPERATIVE EXAMINATION: Primary | ICD-10-CM

## 2024-08-19 DIAGNOSIS — I47.10 SUPRAVENTRICULAR TACHYCARDIA (H): ICD-10-CM

## 2024-08-19 DIAGNOSIS — M54.6 CHRONIC BILATERAL THORACIC BACK PAIN: ICD-10-CM

## 2024-08-19 DIAGNOSIS — F33.1 MODERATE EPISODE OF RECURRENT MAJOR DEPRESSIVE DISORDER (H): Primary | ICD-10-CM

## 2024-08-19 PROCEDURE — 99214 OFFICE O/P EST MOD 30 MIN: CPT | Performed by: NURSE PRACTITIONER

## 2024-08-19 PROCEDURE — 90834 PSYTX W PT 45 MINUTES: CPT | Mod: 95 | Performed by: COUNSELOR

## 2024-08-19 PROCEDURE — G2211 COMPLEX E/M VISIT ADD ON: HCPCS | Performed by: NURSE PRACTITIONER

## 2024-08-19 RX ORDER — TEMAZEPAM 15 MG/1
CAPSULE ORAL
COMMUNITY
Start: 2024-08-13

## 2024-08-19 RX ORDER — PROPRANOLOL HYDROCHLORIDE 10 MG/1
TABLET ORAL
COMMUNITY
Start: 2024-08-05 | End: 2024-10-04

## 2024-08-19 RX ORDER — BUSPIRONE HYDROCHLORIDE 30 MG/1
30 TABLET ORAL 3 TIMES DAILY
COMMUNITY
Start: 2024-08-05

## 2024-08-19 ASSESSMENT — PAIN SCALES - GENERAL: PAINLEVEL: SEVERE PAIN (7)

## 2024-08-19 NOTE — PROGRESS NOTES
Preoperative Evaluation  [address] [address]  Phone: [phone]  Fax: [phone]  Primary Provider: Gisselle Linn NP  Pre-op Performing Provider: Reema White NP  Aug 19, 2024             8/18/2024   Surgical Information   What procedure is being done? HERNIORRHAPHY, INCISIONAL, ROBOT-ASSISTED, LAPAROSCOPIC, USING DA JERRELL XI    Facility or Hospital where procedure/surgery will be performed: [facility]    Who is doing the procedure / surgery? Dr. Dozier   Date of surgery / procedure: 08/30/2024   Time of surgery / procedure: not known yet   Where do you plan to recover after surgery? at home with family        Fax number for surgical facility: Note does not need to be faxed, will be available electronically in Epic.    Assessment & Plan     The proposed surgical procedure is considered INTERMEDIATE risk.    Preoperative examination    Incarcerated incisional hernia    Opioid dependence in remission (H)  Taking Suboxone daily    Constipation due to opioid therapy  Encouraged to continue Linzess, take PRN medications as prescribed     Gastroesophageal reflux disease without esophagitis  Continue Pantoprazole daily    Major depression in remission (H24)  Generalized anxiety disorder  Following with Psychology, continue current medications     Myofascial pain  Following with Pain management, continue current medications     Medical cannabis use  Continue current medications    Supraventricular tachycardia (H24)  EKG in May, 2024 reviewed    Hypokalemia  Normal recent lab work     Tobacco abuse  Encouraged smoking cessation     Possible Sleep Apnea: Defer to PCP            - No identified additional risk factors other than previously addressed    Preoperative Medication Instructions  Antiplatelet or Anticoagulation Medication Instructions   - Patient is on no antiplatelet or anticoagulation medications.    Additional Medication  Instructions   - fiber, laxatives: DO NOT TAKE day of surgery   - Suboxone: Continue without modification. Notify Suboxone prescriber of upcoming surgery, patient will need an individualized perioperative plan.    - Benzodiazepines: Continue without modification.   - SSRIs, SNRIs, TCAs, Antipsychotics: Continue without modification.     Recommendation  Approval given to proceed with proposed procedure, without further diagnostic evaluation.    The longitudinal plan of care for the diagnosis(es)/condition(s) as documented were addressed during this visit. Due to the added complexity in care, I will continue to support Linette in the subsequent management and with ongoing continuity of care.    Subjective   Linette is a 48 year old, presenting for the following:  Pre-Op Exam          8/19/2024     1:32 PM   Additional Questions   Roomed by Jennifer HUNT related to upcoming procedure:     Linette has a history of previous gallbladder removal in 2018. Since her surgery she has had a bulge in her upper abdomen, initially thought to be related to surgical clips, now diagnosed as incisional hernia. This is uncomfortable for her and will cause pain at times. Surgical repair has been recommended.     Linette has a past history of opiate dependence. She is currently taking Suboxone daily. She has subsequent constipation in relation to this as well as GERD treated with pantoprazole. She is scheduled for EGD prior to her hernia repair. She follows with GI. Depression and anxiety are managed with Duloxetine, Temazepam and Alprazolam as needed. She follows with Psychology. She uses medical marijuana and baclofen for chronic pain, followed by pain management as well. She has a remote history of DVT and hypokalemia, recent labs have been normal. She is a current smoker.         8/18/2024   Pre-Op Questionnaire   Have you ever had a heart attack or stroke? No   Have you ever had surgery on your heart or blood vessels, such as a stent  placement, a coronary artery bypass, or surgery on an artery in your head, neck, heart, or legs? No   Do you have chest pain with activity? No   Do you have a history of heart failure? No   Do you currently have a cold, bronchitis or symptoms of other infection? No   Do you have a cough, shortness of breath, or wheezing? No   Do you or anyone in your family have previous history of blood clots? No   Do you or does anyone in your family have a serious bleeding problem such as prolonged bleeding following surgeries or cuts? No   Have you ever had problems with anemia or been told to take iron pills? No   Have you had any abnormal blood loss such as black, tarry or bloody stools, or abnormal vaginal bleeding? No   Have you ever had a blood transfusion? No   Are you willing to have a blood transfusion if it is medically needed before, during, or after your surgery? Yes   Have you or any of your relatives ever had problems with anesthesia? No   Do you have sleep apnea, excessive snoring or daytime drowsiness? (!) YES- Daytime drowsiness    Do you have a CPAP machine? (!) NO - No history of MOHINDER    Do you have any artifical heart valves or other implanted medical devices like a pacemaker, defibrillator, or continuous glucose monitor? No   Do you have artificial joints? No   Are you allergic to latex? No        Health Care Directive  Patient does not have a Health Care Directive or Living Will: Discussed advance care planning with patient; information given to patient to review.    Preoperative Review of    reviewed - controlled substances reflected in medication list.          Patient Active Problem List    Diagnosis Date Noted    Cervicalgia 04/02/2024     Priority: Medium    Cervical radiculopathy 02/14/2024     Priority: Medium    Chronic bilateral thoracic back pain 02/14/2024     Priority: Medium    Lumbar radiculopathy 02/14/2024     Priority: Medium    Pain of both sacroiliac joints 02/14/2024     Priority:  Medium    Radicular pain of upper extremity 02/14/2024     Priority: Medium    Somatic dysfunction of pelvic region 01/10/2024     Priority: Medium    Myofascial pain 01/10/2024     Priority: Medium    Generalized anxiety disorder 11/27/2023     Priority: Medium    Chronic low back pain with bilateral sciatica 06/15/2023     Priority: Medium    Chronic neck pain 06/15/2023     Priority: Medium    Moderate episode of recurrent major depressive disorder (H) 06/29/2022     Priority: Medium    Supraventricular tachycardia (H24) 06/03/2019     Priority: Medium    Psychophysiological insomnia 12/30/2017     Priority: Medium    Chronic bilateral low back pain without sciatica 12/30/2017     Priority: Medium    Opioid dependence in remission (H) 08/02/2015     Priority: Medium     On suboxone treatement with Bryant Harkins.  (Noted in 2015).        Anxiety attack 07/31/2015     Priority: Medium    Hypokalemia 06/23/2015     Priority: Medium    Tobacco abuse 06/23/2015     Priority: Medium    Hyperlipidemia LDL goal <100 01/29/2014     Priority: Medium    GERD (gastroesophageal reflux disease) 07/28/2010     Priority: Medium     Takes omeprazole and metoclopramide      Restless legs 07/28/2010     Priority: Medium      Past Medical History:   Diagnosis Date    Abdominal pain, right lower quadrant 03/09/2008    Admit. Discharged 03/10/08    Anxiety attack 07/31/2015    Atypical chest pain 06/23/2015    De Quervain's disease (tenosynovitis)     Dehydration     Depressive disorder 1996    Gastric ulcer 07/31/2015    GERD (gastroesophageal reflux disease) 07/28/2010    Takes omeprazole and metoclopramide     Hypertension 2017    Ingrowing nail 01/09/2014    Migraines     Opioid dependence in remission (H) 08/02/2015    Other and unspecified ovarian cyst     Papanicolaou smear of cervix with low grade squamous intraepithelial lesion (LGSIL) 07/07/2017     Past Surgical History:   Procedure Laterality Date     BIOPSY CERVICAL, LOCAL EXCISION, SINGLE/MULTIPLE N/A 8/10/2017    Procedure: BIOPSY CERVICAL, LOCAL EXCISION, SINGLE/MULTIPLE;;  Surgeon: Michael Chandler MD;  Location: [facility]    COLONOSCOPY N/A 1/6/2023    Procedure: COLONOSCOPY;  Surgeon: Michael Dozier MD;  [facility]    COLPOSCOPY, BIOPSY, COMBINED N/A 8/10/2017    Procedure: COMBINED COLPOSCOPY, BIOPSY;  Colposcopy with Cervical Biopsies and Endometrial Biopsy, Exam with Ultrasound;  Surgeon: Michael Chandler MD;  Location: [facility]    ESOPHAGOSCOPY, GASTROSCOPY, DUODENOSCOPY (EGD), COMBINED N/A 4/17/2017    Procedure: COMBINED ESOPHAGOSCOPY, GASTROSCOPY, DUODENOSCOPY (EGD);  Surgeon: Ibrahima Esposito MD;  [facility]    ESOPHAGOSCOPY, GASTROSCOPY, DUODENOSCOPY (EGD), COMBINED N/A 1/6/2023    Procedure: ESOPHAGOGASTRODUODENOSCOPY, WITH BIOPSY;  Surgeon: Michael Dozier MD;  [facility]    ESOPHAGOSCOPY, GASTROSCOPY, DUODENOSCOPY (EGD), COMBINED N/A 10/3/2023    Procedure: ESOPHAGOGASTRODUODENOSCOPY, WITH BIOPSY;  Surgeon: John Paul Varela MD;  [facility]    EXAM UNDER ANESTHESIA PELVIC N/A 8/10/2017    Procedure: EXAM UNDER ANESTHESIA PELVIC;;  Surgeon: Michael Chandler MD;  [facility]    HYSTERECTOMY      HYSTERECTOMY, PAP NO LONGER INDICATED      INJECT EPIDURAL CERVICAL N/A 10/20/2023    Procedure: Cervical 6-7 Interlaminar epidural steroid injection using fluoroscopic guidance with contrast dye.;  Surgeon: Trevor Ivory MD;  [facility]    INJECT EPIDURAL CERVICAL N/A 4/19/2024    Procedure: Cervical 6 - Cervical 7 Interlaminar epidural steroid injection using fluoroscopic guidance with contrast dye.;  Surgeon: Trevor Ivory MD;  [facility]    INJECT EPIDURAL LUMBAR Bilateral 7/1/2022    Procedure: Lumbar 5-Sacral 1 Transforaminal Epidural Steroid Injection with fluoroscopic guidance and contrast, bilateral;  Surgeon: Trevor Ivory MD;  [facility]: [facility]    LAPAROSCOPIC CHOLECYSTECTOMY  N/A 2/2/2018    Procedure: LAPAROSCOPIC CHOLECYSTECTOMY;  Laparoscopic Cholecystectomy;  Surgeon: Tigre Lowry DO;  [facility]    LAPAROSCOPIC HYSTERECTOMY TOTAL N/A 10/30/2017    Procedure: LAPAROSCOPIC HYSTERECTOMY TOTAL;  LAPAROSCOPIC HYSTERECTOMY TOTAL POSSIBLE SALPINGO-OOPHERECTOMY (BILATERAL);  Surgeon: Michael Chandler MD;  Location:  OR    [facility] UGI ENDOSCOPY, SIMPLE EXAM  01/07/08     Current Outpatient Medications   Medication Sig Dispense Refill    acetaminophen (TYLENOL) 500 MG tablet Take 1,000 mg by mouth every 6 hours as needed for mild pain      ALPRAZolam (XANAX) 0.5 MG tablet       baclofen (LIORESAL) 20 MG tablet Current dose 20 mg BID. Start 10 mg in afternoon x 5-7 days, then increase to 20 mg TID. 90 tablet 1    bisacodyl (DULCOLAX) 5 MG EC tablet Take 1 tablet (5 mg) by mouth every other day Take every other day. If no bowel movement for 2 or more days, take 2 tablets of bisacodyl (10 mg) 30 tablet 2    buprenorphine HCl-naloxone HCl (SUBOXONE) 2-0.5 MG per film Place 0.5 Film under the tongue daily       CONSTULOSE 10 GM/15ML solution TAKE 15 MLS (10 G) BY MOUTH 3 TIMES DAILY AS NEEDED FOR CONSTIPATION (AS NEEDED FOR SEVERE CONSTIPAITON, NO BM FOR MORE THAN 3 DAYS AND FEELING CONSTIPATED) 300 mL 3    cyclobenzaprine (FLEXERIL) 5 MG tablet TAKE 1-2 TABLETS (5-10 MG) BY MOUTH 3 TIMES DAILY AS NEEDED FOR MUSCLE SPASMS 90 tablet 5    DULoxetine (CYMBALTA) 30 MG capsule Take 30 mg in evening, along with 60 mg in morning. 90 capsule 1    DULoxetine (CYMBALTA) 60 MG capsule Take 1 capsule (60 mg) by mouth every morning 90 capsule 1    famotidine (PEPCID) 40 MG tablet Take 1 tablet (40 mg) by mouth 2 times daily 60 tablet 1    linaclotide (LINZESS) 72 MCG capsule Take 1 capsule (72 mcg) by mouth every morning (before breakfast) 30 capsule 1    ondansetron (ZOFRAN ODT) 4 MG ODT tab DISSOLVE ONE TABLET BY MOUTH EVERY 6 HOURS AS NEEDED FOR NAUSEA 20 tablet 1    pantoprazole  "(PROTONIX) 40 MG EC tablet TAKE ONE TABLET BY MOUTH TWICE A DAY WITH MEALS 60 tablet 5    rOPINIRole (REQUIP) 1 MG tablet TAKE ONE TABLET BY MOUTH EVERY NIGHT AT BEDTIME 90 tablet 0    simvastatin (ZOCOR) 10 MG tablet TAKE ONE TABLET BY MOUTH EVERY NIGHT AT BEDTIME 90 tablet 1    busPIRone (BUSPAR) 15 MG tablet Take 15 mg by mouth 3 times daily (Patient not taking: Reported on 8/19/2024)      cetirizine (ZYRTEC) 10 MG tablet TAKE ONE TABLET BY MOUTH EVERY MORNING (Patient not taking: Reported on 5/14/2024) 90 tablet 3    polyethylene glycol (MIRALAX) 17 GM/Dose powder Take 17 g by mouth daily (Patient not taking: Reported on 5/29/2024) 578 g 3    zolpidem (AMBIEN) 5 MG tablet TAKE ONE TABLET (5 MG) BY MOUTH NIGHTLY AS NEEDED FOR SLEEP (Patient not taking: Reported on 8/19/2024) 30 tablet 1       Allergies   Allergen Reactions    Ergotamine-Caffeine      12-            GI problems-    Seasonal Allergies     Sumatriptan      vomits after giving herself a shot    Compazine Anxiety and Palpitations     Severe anxiety attack    Droperidol Anxiety and Palpitations     Severe anxiety attack    Nubain [Nalbuphine Hcl] Anxiety and Palpitations     Severe anxiety attack    Prochlorperazine Anxiety and Palpitations     Uncontrolled movement, severe anxiety attack        Social History     Tobacco Use    Smoking status: Every Day     Current packs/day: 0.25     Average packs/day: 0.3 packs/day for 20.0 years (5.0 ttl pk-yrs)     Types: Cigarettes     Passive exposure: Never    Smokeless tobacco: Never   Substance Use Topics    Alcohol use: Yes     Comment: Occasionaly       History   Drug Use No             Review of Systems  Constitutional, HEENT, cardiovascular, pulmonary, gi and gu systems are negative, except as otherwise noted.    Objective    /85   Pulse 80   Temp 97.5  F (36.4  C) (Temporal)   Resp 18   Ht 1.62 m (5' 3.78\")   Wt 76.4 kg (168 lb 6.4 oz)   LMP  (LMP Unknown)   SpO2 98%   BMI 29.11 " "kg/m     Estimated body mass index is 29.11 kg/m  as calculated from the following:    Height as of this encounter: 1.62 m (5' 3.78\").    Weight as of this encounter: 76.4 kg (168 lb 6.4 oz).  Physical Exam  GENERAL: alert and no distress  EYES: Eyes grossly normal to inspection, PERRL and conjunctivae and sclerae normal  HENT: ear canals and TM's normal, nose and mouth without ulcers or lesions  NECK: no adenopathy, no asymmetry, masses, or scars  RESP: lungs clear to auscultation - no rales, rhonchi or wheezes  CV: regular rate and rhythm, normal S1 S2, no S3 or S4, no murmur, click or rub, no peripheral edema  ABDOMEN: soft, nontender, no hepatosplenomegaly, no masses and bowel sounds normal  MS: no gross musculoskeletal defects noted, no edema  SKIN: no suspicious lesions or rashes  NEURO: Normal strength and tone, mentation intact and speech normal  PSYCH: mentation appears normal, affect normal/bright    Recent Labs   Lab Test 05/13/24  1538 01/05/24  0800 09/13/23  0012   HGB 13.5  --  12.8     --  231    139 138   POTASSIUM 4.0 3.9 3.5   CR 0.74 0.77 0.68        Diagnostics  No labs were ordered during this visit.   No EKG this visit, completed in the last 90 days.    Revised Cardiac Risk Index (RCRI)  The patient has the following serious cardiovascular risks for perioperative complications:   - No serious cardiac risks = 0 points     RCRI Interpretation: 0 points: Class I (very low risk - 0.4% complication rate)         Signed Electronically by: Reema White NP  A copy of this evaluation report is provided to the requesting physician.         "

## 2024-08-19 NOTE — PATIENT INSTRUCTIONS
How to Take Your Medication Before Surgery  Preoperative Medication Instructions   Antiplatelet or Anticoagulation Medication Instructions   - Patient is on no antiplatelet or anticoagulation medications.    Additional Medication Instructions   - fiber, laxatives: DO NOT TAKE day of surgery   - Suboxone: Continue without modification. Notify Suboxone prescriber of upcoming surgery, patient will need an individualized perioperative plan.    - Benzodiazepines: Continue without modification.   - SSRIs, SNRIs, TCAs, Antipsychotics: Continue without modification.

## 2024-08-19 NOTE — PROGRESS NOTES
Answers submitted by the patient for this visit:  Patient Health Questionnaire (Submitted on 8/18/2024)  If you checked off any problems, how difficult have these problems made it for you to do your work, take care of things at home, or get along with other people?: Extremely difficult  PHQ9 TOTAL SCORE: 22  BO-7 (Submitted on 8/13/2024)  BO 7 TOTAL SCORE: 19        [facility]                                     Progress Note    Patient Name: Radha Beckwith  Date: 8/19/24         Service Type: Individual      Session Start Time: 12:30pm Session End Time: 1:15pm     Session Length:  45    Session #: 48    Attendees: Client    Service Modality:  Video      Provider verified identity through the following two step process.  Patient provided:  Patient is known previously to provider    Telemedicine Visit: The patient's condition can be safely assessed and treated via synchronous audio and visual telemedicine encounter.      Reason for Telemedicine Visit: Patient convenience (e.g. access to timely appointments / distance to available provider)    Originating Site (Patient Location): Patient's home    Distant Site (Provider Location): Provider Remote Setting- Home Office    Consent:  The patient/guardian has verbally consented to: the potential risks and benefits of telemedicine (video visit) versus in person care; bill my insurance or make self-payment for services provided; and responsibility for payment of non-covered services.     Patient would like the video invitation sent by:  My Chart    Mode of Communication:  video    Distant Location (Provider):  On-site    As the provider I attest to compliance with applicable laws and regulations related to telemedicine.    DATA  Interactive Complexity: No  Crisis: No        Progress Since Last Session (Related to Symptoms / Goals / Homework):   Symptoms: Improving .    Homework: Completed in session      Episode of Care Goals: Satisfactory progress -  MAINTENANCE (Working to maintain change, with risk of relapse); Intervened by continuing to positively reinforce healthy behavior choice      Current / Ongoing Stressors and Concerns:   The client stated she's got her pre-op today.   Stated she had a great weekend last weekend. Was able to go see a friend and visit with her all night. Also had a great time with her boyfriend the next day.        Treatment Objective(s) Addressed in This Session:   Increase interest, engagement, and pleasure in doing things  Feel less tired and more energy during the day        Intervention:   Talked about the differences she notices with and without certain medications. Processed the fun she recently was able to have and how this affected not only her mood but also her pain levels.     Assessments completed prior to visit:  The following assessments were completed by patient for this visit:  PHQ9:       6/20/2024    12:32 AM 7/3/2024     1:59 PM 7/11/2024     9:40 AM 7/25/2024    11:51 AM 8/4/2024     7:19 PM 8/13/2024     5:34 PM 8/18/2024    11:34 AM   PHQ-9 SCORE   PHQ-9 Total Score MyChart 22 (Severe depression) 23 (Severe depression) 22 (Severe depression) 21 (Severe depression) 21 (Severe depression) 23 (Severe depression) 22 (Severe depression)   PHQ-9 Total Score 22 23 22 21 21 23 22     GAD7:       4/17/2024     1:23 PM 5/2/2024     1:11 PM 5/23/2024     5:47 AM 6/12/2024     4:08 PM 7/1/2024     2:51 PM 7/25/2024    11:52 AM 8/13/2024     5:35 PM   BO-7 SCORE   Total Score 21 (severe anxiety) 21 (severe anxiety) 21 (severe anxiety) 21 (severe anxiety) 19 (severe anxiety) 21 (severe anxiety) 19 (severe anxiety)   Total Score 21    21 21    21 21 21 19 21 19    19     PROMIS 10-Global Health (all questions and answers displayed):       4/2/2024     4:38 PM 4/10/2024     1:58 PM 4/17/2024     1:28 PM 7/25/2024    11:56 AM 8/4/2024     7:20 PM 8/10/2024     2:51 PM 8/18/2024    11:36 AM   PROMIS 10   In general, would you say  your health is: Poor Poor Poor Poor Poor Fair Poor   In general, would you say your quality of life is: Poor Poor Poor Fair Poor Poor Fair   In general, how would you rate your physical health? Poor Poor Poor Poor Poor Poor Poor   In general, how would you rate your mental health, including your mood and your ability to think? Poor Poor Poor Poor Poor Poor Poor   In general, how would you rate your satisfaction with your social activities and relationships? Poor Poor Poor Poor Fair Poor Fair   In general, please rate how well you carry out your usual social activities and roles Poor Fair Poor Poor Poor Poor Fair   To what extent are you able to carry out your everyday physical activities such as walking, climbing stairs, carrying groceries, or moving a chair? A little A little A little A little Moderately A little A little   In the past 7 days, how often have you been bothered by emotional problems such as feeling anxious, depressed, or irritable? Always Always Always Always Always Always Always   In the past 7 days, how would you rate your fatigue on average? Severe Severe Very severe Severe Severe Very severe Very severe   In the past 7 days, how would you rate your pain on average, where 0 means no pain, and 10 means worst imaginable pain? 7 7 7 7 7 7 7   In general, would you say your health is: 1 1 1 1 1 2 1   In general, would you say your quality of life is: 1 1 1 2 1 1 2   In general, how would you rate your physical health? 1 1 1 1 1 1 1   In general, how would you rate your mental health, including your mood and your ability to think? 1 1 1 1 1 1 1   In general, how would you rate your satisfaction with your social activities and relationships? 1 1 1 1 2 1 2   In general, please rate how well you carry out your usual social activities and roles. (This includes activities at home, at work and in your community, and responsibilities as a parent, child, spouse, employee, friend, etc.) 1 2 1 1 1 1 2   To what  extent are you able to carry out your everyday physical activities such as walking, climbing stairs, carrying groceries, or moving a chair? 2 2 2 2 3 2 2   In the past 7 days, how often have you been bothered by emotional problems such as feeling anxious, depressed, or irritable? 5 5 5 5 5 5 5   In the past 7 days, how would you rate your fatigue on average? 4 4 5 4 4 5 5   In the past 7 days, how would you rate your pain on average, where 0 means no pain, and 10 means worst imaginable pain? 7 7 7 7 7 7 7   Global Mental Health Score 4 4    4 4    4 5    5 5 4    4 6   Global Physical Health Score 7 7    7 6    6 7    7 8 6    6 6   PROMIS TOTAL - SUBSCORES 11 11    11 10    10 12    12 13 10    10 12     [place] Suicide Severity Rating Scale (Lifetime/Recent)      10/4/2022    11:00 AM 5/13/2024     3:13 PM   [place] Suicide Severity Rating (Lifetime/Recent)   Q1 Wished to be Dead (Past Month) no 0-->no   Q2 Suicidal Thoughts (Past Month) no 0-->no   Q3 Suicidal Thought Method no    Q4 Suicidal Intent without Specific Plan no    Q5 Suicide Intent with Specific Plan yes    Q6 Suicide Behavior (Lifetime) yes 0-->no   If yes to Q6, within past 3 months? no    Level of Risk per Screen high risk no risks indicated         ASSESSMENT: Current Emotional / Mental Status (status of significant symptoms):   Risk status (Self / Other harm or suicidal ideation)   Patient denies current fears or concerns for personal safety.   Patient denies current or recent suicidal ideation or behaviors.   Patient denies current or recent homicidal ideation or behaviors.   Patient denies current or recent self injurious behavior or ideation.   Patient denies other safety concerns.   Patient reports there has been no change in risk factors since their last session.     Patient reports there has been no change in protective factors since their last session.     Recommended that patient call 911 or go to the local ED should there be a change  in any of these risk factors.     Appearance:   appropriate    Eye Contact:   good    Psychomotor Behavior: Normal    Attitude:   Cooperative    Orientation:   All   Speech    Rate / Production: Normal/ Responsive Normal     Volume:  Normal    Mood:    Normal   Affect:    Normal    Thought Content:  Clear    Thought Form:  Coherent    Insight:    Good      Medication Review:   No changes to current psychiatric medication(s)     Medication Compliance:   Yes     Changes in Health Issues:   None reported     Chemical Use Review:   Substance Use: Chemical use reviewed, no active concerns identified      Tobacco Use: No change in amount of tobacco use since last session.  Patient declined discussion at this time    Diagnosis:  1. Moderate episode of recurrent major depressive disorder (H)    2. Generalized anxiety disorder            Collateral Reports Completed:   Not Applicable    PLAN: (Patient Tasks / Therapist Tasks / Other)  Continue to continue to work on self care and communication skills.         Ricarda Bhatia, [facility]                                                         ______________________________________________________________________    Individual Treatment Plan    Patient's Name: Radha Beckwith  YOB: 1975    Date of Creation: 6/28/23  Date Treatment Plan Last Reviewed/Revised: 8/14/24    DSM5 Diagnoses: 296.32 (F33.1) Major Depressive Disorder, Recurrent Episode, Moderate _  Psychosocial / Contextual Factors: living with boyfriend, memory issues  PROMIS (reviewed every 90 days):     Referral / Collaboration:  Referral to another professional/service is not indicated at this time..    Anticipated number of session for this episode of care: 9-12 sessions  Anticipation frequency of session:  as needed  Anticipated Duration of each session: 38-52 minutes  Treatment plan will be reviewed in 90 days or when goals have been changed.       MeasurableTreatment Goal(s) related to diagnosis /  functional impairment(s)  Goal 1: Patient will increase communication skills with family members.    Objective #A (Patient Action)    Patient will learn & utilize at least 2 assertive communication skills weekly.  Status: Continued - Date(s): 8/14/24    Intervention(s)  Therapist will teach emotional regulation skills. distress tolerance skills, interpersonal effectiveness skills, emotion regluation skills, mindfulness skills, radical acceptance. Therapist will teach client how to ID body cues for anxiety, anxiety reduction techniques, how to ID triggers for depression and anxiety- decrease reactivity/eliminate, lifestyle changes to reduce depression and anxiety, communication skills, explore cognitive beliefs and help client to develop healthy cognitive patterns and beliefs.    Objective #B  Patient will use thought-stopping strategy daily to reduce intrusive thoughts.  Status: Continued - Date(s): 8/14/24    Intervention(s)  Therapist will teach emotional regulation skills. distress tolerance skills, interpersonal effectiveness skills, emotion regluation skills, mindfulness skills, radical acceptance. Therapist will teach client how to ID body cues for anxiety, anxiety reduction techniques, how to ID triggers for depression and anxiety- decrease reactivity/eliminate, lifestyle changes to reduce depression and anxiety, communication skills, explore cognitive beliefs and help client to develop healthy cognitive patterns and beliefs.      Goal 2: Patient will decrease depression symptoms.      Objective #A (Patient Action)    Status: Continued - Date(s): 8/14/24    Patient will Increase interest, engagement, and pleasure in doing things  Decrease frequency and intensity of feeling down, depressed, hopeless  Improve quantity and quality of night time sleep / decrease daytime naps  Feel less tired and more energy during the day   Improve diet, appetite, mindful eating, and / or meal planning  Identify negative  self-talk and behaviors: challenge core beliefs, myths, and actions  Improve concentration, focus, and mindfulness in daily activities   Feel less fidgety, restless or slow in daily activities / interpersonal interactions.    Intervention(s)  Therapist will teach emotional regulation skills. distress tolerance skills, interpersonal effectiveness skills, emotion regluation skills, mindfulness skills, radical acceptance. Therapist will teach client how to ID body cues for anxiety, anxiety reduction techniques, how to ID triggers for depression and anxiety- decrease reactivity/eliminate, lifestyle changes to reduce depression and anxiety, communication skills, explore cognitive beliefs and help client to develop healthy cognitive patterns and beliefs.    Objective #B  Patient will identify three distraction and diversion activities and use those activities to decrease level of anxiety  .    Status: Continued - Date(s):  8/14/24    Intervention(s)  Therapist will teach emotional regulation skills. distress tolerance skills, interpersonal effectiveness skills, emotion regluation skills, mindfulness skills, radical acceptance. Therapist will teach client how to ID body cues for anxiety, anxiety reduction techniques, how to ID triggers for depression and anxiety- decrease reactivity/eliminate, lifestyle changes to reduce depression and anxiety, communication skills, explore cognitive beliefs and help client to develop healthy cognitive patterns and beliefs.      Patient has reviewed and agreed to the above plan.      Ricarda Bhatia [facility]

## 2024-08-20 ENCOUNTER — MYC MEDICAL ADVICE (OUTPATIENT)
Dept: FAMILY MEDICINE | Facility: CLINIC | Age: 49
End: 2024-08-20
Payer: COMMERCIAL

## 2024-08-20 ENCOUNTER — MYC REFILL (OUTPATIENT)
Dept: FAMILY MEDICINE | Facility: CLINIC | Age: 49
End: 2024-08-20
Payer: COMMERCIAL

## 2024-08-20 DIAGNOSIS — M54.2 CHRONIC NECK PAIN: ICD-10-CM

## 2024-08-20 DIAGNOSIS — G89.29 CHRONIC NECK PAIN: ICD-10-CM

## 2024-08-20 RX ORDER — ONDANSETRON 4 MG/1
TABLET, ORALLY DISINTEGRATING ORAL
Qty: 20 TABLET | Refills: 1 | Status: SHIPPED | OUTPATIENT
Start: 2024-08-20

## 2024-08-20 RX ORDER — BACLOFEN 20 MG/1
20 TABLET ORAL 3 TIMES DAILY
Qty: 90 TABLET | Refills: 1 | Status: SHIPPED | OUTPATIENT
Start: 2024-08-20

## 2024-08-20 NOTE — TELEPHONE ENCOUNTER
Chart reviewed - Request appears appropriate. Refilled for 30 day supply.     Jenny Landrum DNP, APRN, AGNP-C  [facility]

## 2024-08-20 NOTE — TELEPHONE ENCOUNTER
Name from pharmacy: BACLOFEN 20MG TABS         Will file in chart as: baclofen (LIORESAL) 20 MG tablet    Sig: CURRENT DOSAGE IS 1 TABLET BY MOUTH TWICE DAILY. FOLLOW MORE DETAILED TAPER DIRECTIONS FROM PROVIDER. START OF TAPER IS 10 MG IN THE AFTERNOON, AND END OF TAPER IS 20 MG THREE TIMES DAILY    Disp: 90 tablet    Refills: 1    Start: 8/19/2024    Class: E-Prescribe    For: Chronic neck pain, Myofascial pain, Chronic bilateral thoracic back pain, Chronic bilateral low back pain with bilateral sciatica    Last ordered: 2 months ago (5/29/2024) by JEANNIE Schroeder CNP    Last refill: 6/30/2024    Rx #: [id number]       To be filled at: [address]

## 2024-08-20 NOTE — TELEPHONE ENCOUNTER
Received fax request from [place] [facility] requesting refill(s) for baclofen (LIORESAL) 20 MG tablet     Last refilled on 06/30/27    Pt last seen on 05/29/24  Next appt scheduled for 08/28/24    Will facilitate refill.

## 2024-08-20 NOTE — TELEPHONE ENCOUNTER
Chart reviewed - I think it is reasonable to try again, given variables with the first MBB. Do we need to revise the order attached to upcoming procedure appointment with Dr. Reynaga on 9/10/24?    Jenny Landrum, NAOMI, APRN, AGNP-C  [facility]

## 2024-08-21 ENCOUNTER — HOSPITAL ENCOUNTER (OUTPATIENT)
Facility: AMBULATORY SURGERY CENTER | Age: 49
Discharge: HOME | End: 2024-08-21
Attending: INTERNAL MEDICINE
Payer: COMMERCIAL

## 2024-08-21 ENCOUNTER — ANESTHESIA (OUTPATIENT)
Dept: SURGERY | Facility: AMBULATORY SURGERY CENTER | Age: 49
End: 2024-08-21
Payer: COMMERCIAL

## 2024-08-21 VITALS
TEMPERATURE: 97.5 F | HEART RATE: 79 BPM | BODY MASS INDEX: 28.17 KG/M2 | OXYGEN SATURATION: 99 % | DIASTOLIC BLOOD PRESSURE: 75 MMHG | RESPIRATION RATE: 16 BRPM | SYSTOLIC BLOOD PRESSURE: 110 MMHG | WEIGHT: 163 LBS

## 2024-08-21 VITALS — HEART RATE: 67 BPM

## 2024-08-21 LAB — UPPER GI ENDOSCOPY: NORMAL

## 2024-08-21 PROCEDURE — 43249 ESOPH EGD DILATION <30 MM: CPT

## 2024-08-21 PROCEDURE — 43235 EGD DIAGNOSTIC BRUSH WASH: CPT | Performed by: STUDENT IN AN ORGANIZED HEALTH CARE EDUCATION/TRAINING PROGRAM

## 2024-08-21 PROCEDURE — G8907 PT DOC NO EVENTS ON DISCHARG: HCPCS

## 2024-08-21 PROCEDURE — 43235 EGD DIAGNOSTIC BRUSH WASH: CPT | Performed by: NURSE ANESTHETIST, CERTIFIED REGISTERED

## 2024-08-21 PROCEDURE — G8918 PT W/O PREOP ORDER IV AB PRO: HCPCS

## 2024-08-21 RX ORDER — SODIUM CHLORIDE, SODIUM LACTATE, POTASSIUM CHLORIDE, CALCIUM CHLORIDE 600; 310; 30; 20 MG/100ML; MG/100ML; MG/100ML; MG/100ML
INJECTION, SOLUTION INTRAVENOUS CONTINUOUS
Status: DISCONTINUED | OUTPATIENT
Start: 2024-08-21 | End: 2024-08-22 | Stop reason: HOSPADM

## 2024-08-21 RX ORDER — CYCLOBENZAPRINE HCL 5 MG
5-10 TABLET ORAL 3 TIMES DAILY PRN
Qty: 90 TABLET | Refills: 5 | OUTPATIENT
Start: 2024-08-21

## 2024-08-21 RX ORDER — LIDOCAINE 40 MG/G
CREAM TOPICAL
Status: DISCONTINUED | OUTPATIENT
Start: 2024-08-21 | End: 2024-08-22 | Stop reason: HOSPADM

## 2024-08-21 RX ORDER — ONDANSETRON 4 MG/1
4 TABLET, ORALLY DISINTEGRATING ORAL EVERY 6 HOURS PRN
Status: DISCONTINUED | OUTPATIENT
Start: 2024-08-21 | End: 2024-08-22 | Stop reason: HOSPADM

## 2024-08-21 RX ORDER — FLUMAZENIL 0.1 MG/ML
0.2 INJECTION, SOLUTION INTRAVENOUS
Status: ACTIVE | OUTPATIENT
Start: 2024-08-21 | End: 2024-08-21

## 2024-08-21 RX ORDER — ONDANSETRON 2 MG/ML
4 INJECTION INTRAMUSCULAR; INTRAVENOUS
Status: DISCONTINUED | OUTPATIENT
Start: 2024-08-21 | End: 2024-08-22 | Stop reason: HOSPADM

## 2024-08-21 RX ORDER — NALOXONE HYDROCHLORIDE 0.4 MG/ML
0.2 INJECTION, SOLUTION INTRAMUSCULAR; INTRAVENOUS; SUBCUTANEOUS
Status: DISCONTINUED | OUTPATIENT
Start: 2024-08-21 | End: 2024-08-22 | Stop reason: HOSPADM

## 2024-08-21 RX ORDER — ONDANSETRON 2 MG/ML
4 INJECTION INTRAMUSCULAR; INTRAVENOUS EVERY 6 HOURS PRN
Status: DISCONTINUED | OUTPATIENT
Start: 2024-08-21 | End: 2024-08-22 | Stop reason: HOSPADM

## 2024-08-21 RX ORDER — PROPOFOL 10 MG/ML
INJECTION, EMULSION INTRAVENOUS CONTINUOUS PRN
Status: DISCONTINUED | OUTPATIENT
Start: 2024-08-21 | End: 2024-08-21

## 2024-08-21 RX ORDER — SODIUM CHLORIDE, SODIUM LACTATE, POTASSIUM CHLORIDE, CALCIUM CHLORIDE 600; 310; 30; 20 MG/100ML; MG/100ML; MG/100ML; MG/100ML
INJECTION, SOLUTION INTRAVENOUS CONTINUOUS PRN
Status: DISCONTINUED | OUTPATIENT
Start: 2024-08-21 | End: 2024-08-21

## 2024-08-21 RX ORDER — LIDOCAINE HYDROCHLORIDE 10 MG/ML
INJECTION, SOLUTION INFILTRATION; PERINEURAL PRN
Status: DISCONTINUED | OUTPATIENT
Start: 2024-08-21 | End: 2024-08-21

## 2024-08-21 RX ORDER — NALOXONE HYDROCHLORIDE 0.4 MG/ML
0.4 INJECTION, SOLUTION INTRAMUSCULAR; INTRAVENOUS; SUBCUTANEOUS
Status: DISCONTINUED | OUTPATIENT
Start: 2024-08-21 | End: 2024-08-22 | Stop reason: HOSPADM

## 2024-08-21 RX ADMIN — PROPOFOL 200 MCG/KG/MIN: 10 INJECTION, EMULSION INTRAVENOUS at 10:46

## 2024-08-21 RX ADMIN — SODIUM CHLORIDE, SODIUM LACTATE, POTASSIUM CHLORIDE, CALCIUM CHLORIDE: 600; 310; 30; 20 INJECTION, SOLUTION INTRAVENOUS at 10:42

## 2024-08-21 RX ADMIN — PROPOFOL 100 MG: 10 INJECTION, EMULSION INTRAVENOUS at 10:47

## 2024-08-21 RX ADMIN — LIDOCAINE HYDROCHLORIDE 40 MG: 10 INJECTION, SOLUTION INFILTRATION; PERINEURAL at 10:46

## 2024-08-21 RX ADMIN — PROPOFOL 30 MG: 10 INJECTION, EMULSION INTRAVENOUS at 10:49

## 2024-08-21 RX ADMIN — SODIUM CHLORIDE, SODIUM LACTATE, POTASSIUM CHLORIDE, CALCIUM CHLORIDE: 600; 310; 30; 20 INJECTION, SOLUTION INTRAVENOUS at 10:45

## 2024-08-21 NOTE — ANESTHESIA POSTPROCEDURE EVALUATION
Patient: Radha Beckwith    Procedure: Procedure(s):  EGD, gastroesophageal reflux test with mucosal PH electrode  ESOPHAGOGASTRODUODENOSCOPY, WITH BALLOON DILATION OF LESS THAN 30 MILLIMETERS       Anesthesia Type:  MAC    Note:  Disposition: Outpatient   Postop Pain Control: Uneventful            Sign Out: Well controlled pain   PONV: No   Neuro/Psych: Uneventful            Sign Out: Acceptable/Baseline neuro status   Airway/Respiratory: Uneventful            Sign Out: Acceptable/Baseline resp. status   CV/Hemodynamics: Uneventful            Sign Out: Acceptable CV status; No obvious hypovolemia; No obvious fluid overload   Other NRE: NONE   DID A NON-ROUTINE EVENT OCCUR? No           Last vitals:  Vitals Value Taken Time   BP 89/53 08/21/24 1106   Temp     Pulse 80 08/21/24 1106   Resp 14 08/21/24 1106   SpO2 98 % 08/21/24 1106       Electronically Signed By: Emiliano Reeves MD  August 21, 2024  11:24 AM

## 2024-08-21 NOTE — TELEPHONE ENCOUNTER
LVM for patient requesting return call to clinic.     Will await return call from patient and discuss repeat MBB#1.     Reema Gaffney RN

## 2024-08-21 NOTE — PROGRESS NOTES
Radha Beckwith  :  1975  DOS: 2024  MRN: [id number]  PCP: Gisselle Linn    Sports Medicine Clinic Visit    Interim History - 2024  - Last seen on 2024 for bilateral de Quervain's tenosynovitis, right worse than left.  Cervical radiculopathy does not seem to be a major component of her symptoms.  Trial of Medrol Dosepak, recommended keeping a pain log and instructed on thumb spica wrist brace proper use.   - She notes that medrol dosepak was not helpful. Continues to wear thumb spica braces. Also notes she has had bilateral C3-5 MBB and KINDRA injections also that were not helpful for wrist symptoms.  - Plan was to follow-up in 2 to 3 weeks, other medical conditions and mental health concerns delayed follow-up, which is reasonable. Consideration of deQuervain's tendon sheath injections today if needed.     - Since the last visit, she notes continued bilateral radial wrist pain. Interested in injections today.       Initial Visit: 2024  HPI  Radha Beckwith is a 48 year old female who is seen in consultation at the request of  Jenny Landrum C.N.P. presenting with bilateral hand pain.    - Mechanism of Injury:    - No inciting injury  - Pertinent history and prior evaluations:    - Hx of cervical radiculopathy and chronic low back pain.  S/p C6-C7 IL JASE, most recent in  and 2024.  Suspected of carpal tunnel syndrome and de Quervain's tenosynovitis bilaterally in the past.  - 2023 BUE EMG within normal limits without electrodiagnostic evidence of median mononeuropathies at the wrists.  Also needled the right side, which was normal.  - Seen in  and  for bilateral hand weakness. Did occupational therapy. Was working with Dr. Pat Patterson.  Then saw Dr. Gonsalves on 2023 and 2023, has been using multiple wrist splints and Voltaren gel.  No previous injections.  Suspected probable de Quervain's tenosynovitis bilaterally and cervical  radiculopathy contributing to her pain, not classic for CTS.  Recommended consideration of cervical JASE and repeat EMG if needed.    - Pain Character:    - Location:  bilateral base of thumbs into radial wrist and bilateral long fingers  - Character:  burning, pins and needles, electric shock  - Duration:  chronic  - Course:  worsening  - Endorses:    - numbness, tingling occasionally in the superficial radial nerve distribution, hand weakness, right wrist clicking, right long finger triggering, swelling in fingers  - Denies:    - instability, radicular shooting pain  - Alleviating factors:    -  thumb spica wrist braces , topicals  - Aggravating factors:    - gripping, weather changes  - Other treatments tried:    - thumb spica wrist braces (helpful but limiting what she can do with braces on), Voltaren, unable to take IBU, heat, cold, Lidocaine patches, topical     - Patient Goals:    - Referred for hand/wrist injections  - Social History:   -  Currently not working: working on getting on disability      Review of Systems  Musculoskeletal: as above  Remainder of review of systems is negative including constitutional, CV, pulmonary, GI, Skin and Neurologic except as noted in HPI or medical history.    Past Medical History:   Diagnosis Date    Abdominal pain, right lower quadrant 03/09/2008    Admit. Discharged 03/10/08    Anxiety attack 07/31/2015    Atypical chest pain 06/23/2015    De Quervain's disease (tenosynovitis)     Dehydration     Depressive disorder 1996    Gastric ulcer 07/31/2015    GERD (gastroesophageal reflux disease) 07/28/2010    Takes omeprazole and metoclopramide     Hypertension 2017    Ingrowing nail 01/09/2014    Migraines     Opioid dependence in remission (H) 08/02/2015    Other and unspecified ovarian cyst     Papanicolaou smear of cervix with low grade squamous intraepithelial lesion (LGSIL) 07/07/2017     Past Surgical History:   Procedure Laterality Date    BIOPSY CERVICAL, LOCAL  EXCISION, SINGLE/MULTIPLE N/A 8/10/2017    Procedure: BIOPSY CERVICAL, LOCAL EXCISION, SINGLE/MULTIPLE;;  Surgeon: Michael Chandler MD;  [facility]    COLONOSCOPY N/A 1/6/2023    Procedure: COLONOSCOPY;  Surgeon: Michael Dozier MD;  [facility]    COLPOSCOPY, BIOPSY, COMBINED N/A 8/10/2017    Procedure: COMBINED COLPOSCOPY, BIOPSY;  Colposcopy with Cervical Biopsies and Endometrial Biopsy, Exam with Ultrasound;  Surgeon: Michael Chandler MD;  [facility]    ESOPHAGOSCOPY, GASTROSCOPY, DUODENOSCOPY (EGD), COMBINED N/A 4/17/2017    Procedure: COMBINED ESOPHAGOSCOPY, GASTROSCOPY, DUODENOSCOPY (EGD);  Surgeon: Ibrahima Esposito MD;  [facility]    ESOPHAGOSCOPY, GASTROSCOPY, DUODENOSCOPY (EGD), COMBINED N/A 1/6/2023    Procedure: ESOPHAGOGASTRODUODENOSCOPY, WITH BIOPSY;  Surgeon: Michael Dozier MD;  [facility]    ESOPHAGOSCOPY, GASTROSCOPY, DUODENOSCOPY (EGD), COMBINED N/A 10/3/2023    Procedure: ESOPHAGOGASTRODUODENOSCOPY, WITH BIOPSY;  Surgeon: John Paul Varela MD;  [facility]    EXAM UNDER ANESTHESIA PELVIC N/A 8/10/2017    Procedure: EXAM UNDER ANESTHESIA PELVIC;;  Surgeon: Michael Chandler MD;  [facility]    HYSTERECTOMY      HYSTERECTOMY, PAP NO LONGER INDICATED      INJECT EPIDURAL CERVICAL N/A 10/20/2023    Procedure: Cervical 6-7 Interlaminar epidural steroid injection using fluoroscopic guidance with contrast dye.;  Surgeon: Trevor Ivory MD;  [facility]    INJECT EPIDURAL CERVICAL N/A 4/19/2024    Procedure: Cervical 6 - Cervical 7 Interlaminar epidural steroid injection using fluoroscopic guidance with contrast dye.;  Surgeon: Trevor Ivory MD;  [facility]    INJECT EPIDURAL LUMBAR Bilateral 7/1/2022    Procedure: Lumbar 5-Sacral 1 Transforaminal Epidural Steroid Injection with fluoroscopic guidance and contrast, bilateral;  Surgeon: Trevor Ivory MD;  [facility]    LAPAROSCOPIC CHOLECYSTECTOMY N/A 2/2/2018     Procedure: LAPAROSCOPIC CHOLECYSTECTOMY;  Laparoscopic Cholecystectomy;  Surgeon: Tigre Lowry DO;  [facility]    LAPAROSCOPIC HYSTERECTOMY TOTAL N/A 10/30/2017    Procedure: LAPAROSCOPIC HYSTERECTOMY TOTAL;  LAPAROSCOPIC HYSTERECTOMY TOTAL POSSIBLE SALPINGO-OOPHERECTOMY (BILATERAL);  Surgeon: Michael Chandler MD;  Location:  OR    [facility] UGI ENDOSCOPY, SIMPLE EXAM  08     Family History   Problem Relation Age of Onset    Depression Mother     Respiratory Mother     Chronic Obstructive Pulmonary Disease Mother     Hypertension Mother     Anxiety Disorder Mother     Cerebrovascular Disease Father         First stroke at age 28,  from [facility] when he was 55. Brain aneurysms.    Breast Cancer Cousin     Adrenal Disorder Other     Chronic Obstructive Pulmonary Disease Other     Asthma Brother          Objective  LMP  (LMP Unknown)     General: healthy, alert and in no acute distress.    HEENT: no scleral icterus or conjunctival erythema.   Skin: no suspicious lesions or rash. No jaundice.   CV: regular rhythm by palpation, 2+ distal pulses.  Resp: normal respiratory effort without conversational dyspnea.   Psych: normal mood and affect.    Gait: nonantalgic, appropriate coordination and balance.     Neuro:        - Sensation to light touch:    - Intact throughout the BUE including all peripheral nerve distributions.        - Special tests:   - Tinel's at the wrist:  Neg    - Tinel's at the elbow:  Neg    - Stressed Phalen's:  Neg     MSK - Wrist/Hand:        - Inspection:    - No surrounding major swelling, erythema, warmth, ecchymosis, lesion, or atrophy noted.        - ROM:    - Full AROM/PROM with pain during thumb abduction, extension       - Palpation:    - TTP at the de Quervain's tendon sheath bilaterally, right worse than left.   - NTTP elsewhere.        - Strength:  (*antalgic)   - Forearm Pronation   5   - Forearm Supination   5   - Wrist Extension   5   - Wrist  Flexion    5   - FDI     5   - ADM     5   - FPL     5   - APB     5   - EIP     5   - EDC     5   - APL/EPB    5-*            - Special tests:        - CMC grind:  Neg    - Finkelstein's:  ++Pos, R > L   - TFCC compression:  Neg    - Fulton's:  Neg       Radiology  I independently reviewed the available relevant imaging in the chart with my interpretations as above in HPI.   - XR B/L hands 6/6/2024 shows normal anatomic alignment without significant degenerative changes, no acute fractures or dislocations.    Procedure  Hand / Upper Extremity Injection/Arthrocentesis: bilateral extensor compartment 1    Date/Time: 8/22/2024 2:38 PM    Performed by: Jose Adame DO  Authorized by: Jose Adame DO    Indications:  Pain  Needle Size:  27 G  Guidance: ultrasound    Approach:  Radial  Condition: de Quervain's    Laterality:  Bilateral    Site:  Bilateral extensor compartment 1  Medications (Right):  20 mg triamcinolone 40 MG/ML; 2 mL BUPivacaine 0.5 %; 2 mL lidocaine 1 %  Medications (Left):  20 mg triamcinolone 40 MG/ML; 2 mL BUPivacaine 0.5 %; 2 mL lidocaine 1 %  Outcome:  Tolerated well, no immediate complications  Procedure discussed: discussed risks, benefits, and alternatives    Consent Given by:  Patient  Prep: patient was prepped and draped in usual sterile fashion      Procedure:   De Quervain's Tendon Sheath Injection  Consent given, and signed on chart. The area was prepped in typical sterile fashion with chlorhexidine solution. First dorsal compartment thumb extensor tendons (APL/EPB) were identified under ultrasound on long and short axis.  A 1.5 inch 27 gauge needle was placed into the first dorsal compartment tendon sheath between the APL and EPB tendons, taking care not to inject the tendons themselves.  A mixture of 0.5ml 1% lidocaine and 0.5 ml kenalog (40mg/ml) was injected without difficulty. Ultrasound documentation of needle placement and injection was achieved.  After removal of the needle,  the area was cleaned, hemostasis achieved, and bandage applied.  Patient tolerated the procedure well, no complications.      Assessment  1. De Quervain's tenosynovitis, bilateral          Plan  Radha Beckwith is a pleasant 48 year old female that presents with chronic bilateral hand pain, right worse than left.  She has multiple areas of pain, worst is in the radial wrist and thumb that hurts the most with repetitive hand motions, hand , thumb abduction/extension.  Very positive Finkelstein's maneuver today on exam.  She does have some pain elsewhere in the wrists and some numbness/tingling in the hand.  The numbness and tingling appears most consistent with superficial radial neuropathy, may have some CTS paresthesias as well.  Radiographs within normal limits without significant degenerative changes, fractures, or dislocations.  She has been trying thumb spica bracing in the past which she wears only at night, and has not helped fully, no injections in the past.  No significant radicular shooting pain throughout the arm.  History and physical exam appear most consistent with de Quervain's tenosynovitis bilaterally, right worse than left.  She does not seem to have a major component of cervical radiculopathy affecting her symptoms.    We discussed the nature of the condition and available treatment options, and mutually agreed upon the following plan:    She would like to start by trying a Medrol Dosepak to see how much pain relief she gets from the burst pack, she will keep a pain log of what pain improves and what pain does.  Instructed on proper use of the thumb spica brace which will be to use it during exacerbating activities, do not necessarily need to wear it at night or at rest unless she finds it helpful.  She will try this new regimen for thumb spica bracing.  Also talked about more low-profile braces such as a gamekeeper brace, which she may take from clinic or purchase on her own.  Try to  gamekeeper brace on in clinic and decided not to take 1 today.  She will use Voltaren gel and other topicals with Tylenol as needed for breakthrough pain.    We will follow-up in 2 to 3 weeks to evaluate how much improvement she got from the Medrol Dosepak and bracing modifications, may consider de Quervain's tendon sheath corticosteroid injection at that time if desired.  May contact clinic for questions/concerns as needed.    Updated Plan - 8/22/24:  Linette presents for follow-up of her bilateral wrist and thumb pain secondary to de Quervain's tenosynovitis.  She has undergone multiple surgical procedures including MBB/RFA and JASE which did not change the pain in the radial wrist/thumb.  She has been wearing thumb spica braces and continues to have persistent symptoms.  Interested in previously discussed de Quervain's tenosynovitis corticosteroid injections today.  Performed bilateral DQ injections in clinic without complication and immediate improvement in pain.  May follow-up in 3+ months as needed if symptoms return.  May continue to use thumb spica braces as needed.    Jose Adame DO, JORGE  [facility]  [facility] - Department of Orthopedic Surgery       Disclaimer:  This note was prepared and written using Dragon Medical dictation software. As a result, there may be errors in the script that have gone undetected. Please consider this when interpreting the information in this note.

## 2024-08-21 NOTE — H&P
ENDOSCOPY PRE-SEDATION H&P FOR OUTPATIENT PROCEDURES    Radha Beckwith  [id number]  1975    Procedure: EGD BRAVO    Pre-procedure diagnosis: GERD    Past medical history:   Past Medical History:   Diagnosis Date    Abdominal pain, right lower quadrant 03/09/2008    Admit. Discharged 03/10/08    Anxiety attack 07/31/2015    Atypical chest pain 06/23/2015    De Quervain's disease (tenosynovitis)     Dehydration     Depressive disorder 1996    Gastric ulcer 07/31/2015    GERD (gastroesophageal reflux disease) 07/28/2010    Takes omeprazole and metoclopramide     Hypertension 2017    Ingrowing nail 01/09/2014    Migraines     Opioid dependence in remission (H) 08/02/2015    Other and unspecified ovarian cyst     Papanicolaou smear of cervix with low grade squamous intraepithelial lesion (LGSIL) 07/07/2017       Past surgical history:   Past Surgical History:   Procedure Laterality Date    BIOPSY CERVICAL, LOCAL EXCISION, SINGLE/MULTIPLE N/A 8/10/2017    Procedure: BIOPSY CERVICAL, LOCAL EXCISION, SINGLE/MULTIPLE;;  Surgeon: Michael Chandler MD;  [facility]    COLONOSCOPY N/A 1/6/2023    Procedure: COLONOSCOPY;  Surgeon: Michael Dozier MD;  [facility]    COLPOSCOPY, BIOPSY, COMBINED N/A 8/10/2017    Procedure: COMBINED COLPOSCOPY, BIOPSY;  Colposcopy with Cervical Biopsies and Endometrial Biopsy, Exam with Ultrasound;  Surgeon: Michael Chandler MD;  [facility]    ESOPHAGOSCOPY, GASTROSCOPY, DUODENOSCOPY (EGD), COMBINED N/A 4/17/2017    Procedure: COMBINED ESOPHAGOSCOPY, GASTROSCOPY, DUODENOSCOPY (EGD);  Surgeon: Ibrahima Esposito MD;  [facility]    ESOPHAGOSCOPY, GASTROSCOPY, DUODENOSCOPY (EGD), COMBINED N/A 1/6/2023    Procedure: ESOPHAGOGASTRODUODENOSCOPY, WITH BIOPSY;  Surgeon: Michael Dozier MD;  [facility]    ESOPHAGOSCOPY, GASTROSCOPY, DUODENOSCOPY (EGD), COMBINED N/A 10/3/2023    Procedure: ESOPHAGOGASTRODUODENOSCOPY, WITH BIOPSY;  Surgeon: Avery  John Paul CHOI MD;  [facility]    EXAM UNDER ANESTHESIA PELVIC N/A 8/10/2017    Procedure: EXAM UNDER ANESTHESIA PELVIC;;  Surgeon: Michael Chandler MD;  [facility]    HYSTERECTOMY      HYSTERECTOMY, PAP NO LONGER INDICATED      INJECT EPIDURAL CERVICAL N/A 10/20/2023    Procedure: Cervical 6-7 Interlaminar epidural steroid injection using fluoroscopic guidance with contrast dye.;  Surgeon: Trevor Ivory MD;  [facility]    INJECT EPIDURAL CERVICAL N/A 4/19/2024    Procedure: Cervical 6 - Cervical 7 Interlaminar epidural steroid injection using fluoroscopic guidance with contrast dye.;  Surgeon: Trevor Ivory MD;  [facility]    INJECT EPIDURAL LUMBAR Bilateral 7/1/2022    Procedure: Lumbar 5-Sacral 1 Transforaminal Epidural Steroid Injection with fluoroscopic guidance and contrast, bilateral;  Surgeon: Trevor Ivory MD;  [facility]    LAPAROSCOPIC CHOLECYSTECTOMY N/A 2/2/2018    Procedure: LAPAROSCOPIC CHOLECYSTECTOMY;  Laparoscopic Cholecystectomy;  Surgeon: Tigre Lowry DO;  [facility]    LAPAROSCOPIC HYSTERECTOMY TOTAL N/A 10/30/2017    Procedure: LAPAROSCOPIC HYSTERECTOMY TOTAL;  LAPAROSCOPIC HYSTERECTOMY TOTAL POSSIBLE SALPINGO-OOPHERECTOMY (BILATERAL);  Surgeon: Michael Chandler MD;  Location:  OR    [facility] UGI ENDOSCOPY, SIMPLE EXAM  01/07/08       Current Outpatient Medications   Medication Sig Dispense Refill    acetaminophen (TYLENOL) 500 MG tablet Take 1,000 mg by mouth every 6 hours as needed for mild pain      ALPRAZolam (XANAX) 0.5 MG tablet       baclofen (LIORESAL) 20 MG tablet Take 1 tablet (20 mg) by mouth 3 times daily 90 tablet 1    bisacodyl (DULCOLAX) 5 MG EC tablet Take 1 tablet (5 mg) by mouth every other day Take every other day. If no bowel movement for 2 or more days, take 2 tablets of bisacodyl (10 mg) 30 tablet 2    buprenorphine HCl-naloxone HCl (SUBOXONE) 2-0.5 MG per film Place 0.5 Film under the tongue daily        busPIRone HCl (BUSPAR) 30 MG tablet 30 mg 3 times daily      CONSTULOSE 10 GM/15ML solution TAKE 15 MLS (10 G) BY MOUTH 3 TIMES DAILY AS NEEDED FOR CONSTIPATION (AS NEEDED FOR SEVERE CONSTIPAITON, NO BM FOR MORE THAN 3 DAYS AND FEELING CONSTIPATED) 300 mL 3    cyclobenzaprine (FLEXERIL) 5 MG tablet TAKE 1-2 TABLETS (5-10 MG) BY MOUTH 3 TIMES DAILY AS NEEDED FOR MUSCLE SPASMS 90 tablet 5    DULoxetine (CYMBALTA) 30 MG capsule Take 30 mg in evening, along with 60 mg in morning. 90 capsule 1    DULoxetine (CYMBALTA) 60 MG capsule Take 1 capsule (60 mg) by mouth every morning 90 capsule 1    famotidine (PEPCID) 40 MG tablet Take 1 tablet (40 mg) by mouth 2 times daily 60 tablet 1    linaclotide (LINZESS) 72 MCG capsule Take 1 capsule (72 mcg) by mouth every morning (before breakfast) 30 capsule 1    medical cannabis (Patient's own supply) See Admin Instructions (The purpose of this order is to document that the patient reports taking medical cannabis.  This is not a prescription, and is not used to certify that the patient has a qualifying medical condition.)      propranolol (INDERAL) 10 MG tablet       rOPINIRole (REQUIP) 1 MG tablet TAKE ONE TABLET BY MOUTH EVERY NIGHT AT BEDTIME 90 tablet 0    simvastatin (ZOCOR) 10 MG tablet TAKE ONE TABLET BY MOUTH EVERY NIGHT AT BEDTIME 90 tablet 1    temazepam (RESTORIL) 15 MG capsule       ondansetron (ZOFRAN ODT) 4 MG ODT tab DISSOLVE ONE TABLET ON TONGUE EVERY 6 HOURS AS NEEDED FOR NAUSEA 20 tablet 1    pantoprazole (PROTONIX) 40 MG EC tablet TAKE ONE TABLET BY MOUTH TWICE A DAY WITH MEALS 60 tablet 5     Current Facility-Administered Medications   Medication Dose Route Frequency Provider Last Rate Last Admin    lactated ringers infusion   Intravenous Continuous Emiliano Reeves MD 10 mL/hr at 08/21/24 1042 New Bag at 08/21/24 1042    lidocaine (LMX4) kit   Topical Q1H PRN Cristopher Vallejo MD        lidocaine (LMX4) kit   Topical Q1H PRN Emiliano Reeves MD         lidocaine 1 % 0.1-1 mL  0.1-1 mL Other Q1H PRN Cristopher Vallejo MD        lidocaine 1 % 0.1-1 mL  0.1-1 mL Other Q1H PRN Emiliano Reeves MD        ondansetron (ZOFRAN) injection 4 mg  4 mg Intravenous Once PRN Cristopher Vallejo MD        sodium chloride (PF) 0.9% PF flush 3 mL  3 mL Intracatheter Q8H Cristopher Vallejo MD        sodium chloride (PF) 0.9% PF flush 3 mL  3 mL Intracatheter q1 min prn Cristopher Vallejo MD        sodium chloride (PF) 0.9% PF flush 3 mL  3 mL Intracatheter Q8H Emiliano Reeves MD        sodium chloride (PF) 0.9% PF flush 3 mL  3 mL Intracatheter q1 min prn Emiliano Reeves MD           Allergies   Allergen Reactions    Ergotamine-Caffeine      12-            GI problems-    Seasonal Allergies     Sumatriptan      vomits after giving herself a shot    Compazine Anxiety and Palpitations     Severe anxiety attack    Droperidol Anxiety and Palpitations     Severe anxiety attack    Nubain [Nalbuphine Hcl] Anxiety and Palpitations     Severe anxiety attack    Prochlorperazine Anxiety and Palpitations     Uncontrolled movement, severe anxiety attack       History of Anesthesia/Sedation Problems: no    PHYSICAL EXAMINATION:  Constitutional: aaox3, cooperative, pleasant  Vitals reviewed: /77   Temp 97.5  F (36.4  C) (Temporal)   Resp 16   Wt 73.9 kg (163 lb)   LMP  (LMP Unknown)   SpO2 96%   BMI 28.17 kg/m    Wt:   Wt Readings from Last 2 Encounters:   08/21/24 73.9 kg (163 lb)   08/19/24 76.4 kg (168 lb 6.4 oz)      Eyes: Sclera anicteric/injected  Ears/nose/mouth/throat: Normal oropharynx without ulcers or exudate, mucus membranes moist, hearing intact  Neck: supple, thyroid normal size  CV: No edema  Respiratory: Unlabored breathing  Lymph: No submandibular, supraclavicular or inguinal lymphadenopathy  Abd: Nondistended, no masses, nontender  Skin: warm, perfused, no jaundice  Psych: Normal affect  MSK: normal movement on  limited exam.    ASA Score: See Provation note    Assessment/Plan:     The patient is an appropriate candidate to receive sedation.    Informed consent was discussed with the patient/family, including the risks, benefits, potential complications and any alternative options associated with sedation.    Patient assessment completed just prior to sedation and while under constant observation by the provider. Condition determined to be adequate for proceeding with sedation.    The specific risks for the procedure were discussed with the patient at the time of informed consent and include but are not limited to perforation which could require surgery, missing significant neoplasm or lesion, hemorrhage and adverse sedative complication.      Cristopher Vallejo MD

## 2024-08-21 NOTE — ANESTHESIA CARE TRANSFER NOTE
Patient: Radah Beckwith    Procedure: Procedure(s):  EGD, gastroesophageal reflux test with mucosal PH electrode  ESOPHAGOGASTRODUODENOSCOPY, WITH BALLOON DILATION OF LESS THAN 30 MILLIMETERS       Diagnosis: Gastroesophageal reflux disease with esophagitis without hemorrhage [K21.00]  Atypical chest pain [R07.89]  Diagnosis Additional Information: No value filed.    Anesthesia Type:   MAC     Note:    Oropharynx: oropharynx clear of all foreign objects and spontaneously breathing  Level of Consciousness: drowsy  Oxygen Supplementation: room air    Independent Airway: airway patency satisfactory and stable  Dentition: dentition unchanged  Vital Signs Stable: post-procedure vital signs reviewed and stable  Report to RN Given: handoff report given  Patient transferred to: Phase II    Handoff Report: Identifed the Patient, Identified the Reponsible Provider, Reviewed the pertinent medical history, Discussed the surgical course, Reviewed Intra-OP anesthesia mangement and issues during anesthesia, Set expectations for post-procedure period and Allowed opportunity for questions and acknowledgement of understanding  Vitals:  Vitals Value Taken Time   BP     Temp     Pulse 67 08/21/24 1100   Resp     SpO2         Electronically Signed By: JEANNIE Calles CRNA  August 21, 2024  11:04 AM

## 2024-08-21 NOTE — ANESTHESIA PREPROCEDURE EVALUATION
Anesthesia Pre-Procedure Evaluation    Patient: Radha Beckwith   MRN: [id number] : 1975        Procedure : Procedure(s):  EGD, gastroesophageal reflux test with mucosal PH electrode          Past Medical History:   Diagnosis Date    Abdominal pain, right lower quadrant 2008    Admit. Discharged 03/10/08    Anxiety attack 2015    Atypical chest pain 2015    De Quervain's disease (tenosynovitis)     Dehydration     Depressive disorder     Gastric ulcer 2015    GERD (gastroesophageal reflux disease) 2010    Takes omeprazole and metoclopramide     Hypertension 2017    Ingrowing nail 2014    Migraines     Opioid dependence in remission (H) 2015    Other and unspecified ovarian cyst     Papanicolaou smear of cervix with low grade squamous intraepithelial lesion (LGSIL) 2017      Past Surgical History:   Procedure Laterality Date    BIOPSY CERVICAL, LOCAL EXCISION, SINGLE/MULTIPLE N/A 8/10/2017    Procedure: BIOPSY CERVICAL, LOCAL EXCISION, SINGLE/MULTIPLE;;  Surgeon: Michael Chandler MD;  [facility]    COLONOSCOPY N/A 2023    Procedure: COLONOSCOPY;  Surgeon: Michael Dozier MD;  [facility]    COLPOSCOPY, BIOPSY, COMBINED N/A 8/10/2017    Procedure: COMBINED COLPOSCOPY, BIOPSY;  Colposcopy with Cervical Biopsies and Endometrial Biopsy, Exam with Ultrasound;  Surgeon: Michael Chandler MD;  [facility]    ESOPHAGOSCOPY, GASTROSCOPY, DUODENOSCOPY (EGD), COMBINED N/A 2017    Procedure: COMBINED ESOPHAGOSCOPY, GASTROSCOPY, DUODENOSCOPY (EGD);  Surgeon: Ibrahima Esposito MD;  [facility]    ESOPHAGOSCOPY, GASTROSCOPY, DUODENOSCOPY (EGD), COMBINED N/A 2023    Procedure: ESOPHAGOGASTRODUODENOSCOPY, WITH BIOPSY;  Surgeon: Michael Dozier MD;  [facility]    ESOPHAGOSCOPY, GASTROSCOPY, DUODENOSCOPY (EGD), COMBINED N/A 10/3/2023    Procedure: ESOPHAGOGASTRODUODENOSCOPY, WITH BIOPSY;  Surgeon: John Paul Varela  MD;  [facility]    EXAM UNDER ANESTHESIA PELVIC N/A 8/10/2017    Procedure: EXAM UNDER ANESTHESIA PELVIC;;  Surgeon: Michael Chandler MD;  [facility]    HYSTERECTOMY      HYSTERECTOMY, PAP NO LONGER INDICATED      INJECT EPIDURAL CERVICAL N/A 10/20/2023    Procedure: Cervical 6-7 Interlaminar epidural steroid injection using fluoroscopic guidance with contrast dye.;  Surgeon: Trevor Ivory MD;  [facility]    INJECT EPIDURAL CERVICAL N/A 4/19/2024    Procedure: Cervical 6 - Cervical 7 Interlaminar epidural steroid injection using fluoroscopic guidance with contrast dye.;  Surgeon: Trevor Ivory MD;  [facility]    INJECT EPIDURAL LUMBAR Bilateral 7/1/2022    Procedure: Lumbar 5-Sacral 1 Transforaminal Epidural Steroid Injection with fluoroscopic guidance and contrast, bilateral;  Surgeon: Trevor Ivory MD;  [facility]    LAPAROSCOPIC CHOLECYSTECTOMY N/A 2/2/2018    Procedure: LAPAROSCOPIC CHOLECYSTECTOMY;  Laparoscopic Cholecystectomy;  Surgeon: Tigre Lowry DO;  [facility]    LAPAROSCOPIC HYSTERECTOMY TOTAL N/A 10/30/2017    Procedure: LAPAROSCOPIC HYSTERECTOMY TOTAL;  LAPAROSCOPIC HYSTERECTOMY TOTAL POSSIBLE SALPINGO-OOPHERECTOMY (BILATERAL);  Surgeon: Michael Chandler MD;  Location:  OR    [facility] UGI ENDOSCOPY, SIMPLE EXAM  01/07/08      Allergies   Allergen Reactions    Ergotamine-Caffeine      12-            GI problems-    Seasonal Allergies     Sumatriptan      vomits after giving herself a shot    Compazine Anxiety and Palpitations     Severe anxiety attack    Droperidol Anxiety and Palpitations     Severe anxiety attack    Nubain [Nalbuphine Hcl] Anxiety and Palpitations     Severe anxiety attack    Prochlorperazine Anxiety and Palpitations     Uncontrolled movement, severe anxiety attack      Social History     Tobacco Use    Smoking status: Every Day     Current packs/day: 0.25     Average packs/day: 0.3 packs/day for 20.0 years (5.0  ttl pk-yrs)     Types: Cigarettes     Passive exposure: Never    Smokeless tobacco: Never   Substance Use Topics    Alcohol use: Yes     Comment: Occasionaly      Wt Readings from Last 1 Encounters:   08/19/24 76.4 kg (168 lb 6.4 oz)        Anesthesia Evaluation   Pt has had prior anesthetic.     No history of anesthetic complications       ROS/MED HX  ENT/Pulmonary:  - neg pulmonary ROS     Neurologic:  - neg neurologic ROS     Cardiovascular:  - neg cardiovascular ROS  (-) hypertension   METS/Exercise Tolerance: >4 METS    Hematologic:  - neg hematologic  ROS     Musculoskeletal:  - neg musculoskeletal ROS     GI/Hepatic:  - neg GI/hepatic ROS   (+) GERD,                   Renal/Genitourinary:  - neg Renal ROS     Endo:  - neg endo ROS     Psychiatric/Substance Use:  - neg psychiatric ROS   (+)    H/O chronic opiod use .     Infectious Disease:  - neg infectious disease ROS     Malignancy:  - neg malignancy ROS     Other:  - neg other ROS          Physical Exam    Airway        Mallampati: II   TM distance: > 3 FB   Neck ROM: full   Mouth opening: > 3 cm    Respiratory Devices and Support         Dental       (+) Edentulous and Removable bridges or other hardware      Cardiovascular   cardiovascular exam normal          Pulmonary   pulmonary exam normal                OUTSIDE LABS:  CBC:   Lab Results   Component Value Date    WBC 8.0 05/13/2024    WBC 8.0 09/13/2023    HGB 13.5 05/13/2024    HGB 12.8 09/13/2023    HCT 41.3 05/13/2024    HCT 38.4 09/13/2023     05/13/2024     09/13/2023     BMP:   Lab Results   Component Value Date     05/13/2024     01/05/2024    POTASSIUM 4.0 05/13/2024    POTASSIUM 3.9 01/05/2024    CHLORIDE 102 05/13/2024    CHLORIDE 99 01/05/2024    CO2 27 05/13/2024    CO2 32 (H) 01/05/2024    BUN 13.1 05/13/2024    BUN 10.1 01/05/2024    CR 0.74 05/13/2024    CR 0.77 01/05/2024     (H) 05/13/2024     (H) 01/05/2024     COAGS:   Lab Results   Component  "Value Date    PTT 24 03/24/2017    INR 0.87 03/24/2017     POC:   Lab Results   Component Value Date    HCG Negative 05/22/2013    HCGS Negative 09/06/2023     HEPATIC:   Lab Results   Component Value Date    ALBUMIN 4.3 09/13/2023    PROTTOTAL 7.2 09/13/2023    ALT 25 09/13/2023    AST 24 09/13/2023    ALKPHOS 87 09/13/2023    BILITOTAL <0.2 09/13/2023     OTHER:   Lab Results   Component Value Date    A1C 5.0 11/24/2019    MOISES 9.2 05/13/2024    PHOS 2.7 08/17/2007    MAG 2.0 01/05/2024    LIPASE 20 09/13/2023    AMYLASE 37 01/24/2018    TSH 3.75 01/05/2024    CRP 6.3 03/02/2021    SED 17 09/13/2023       Anesthesia Plan    ASA Status:  2    NPO Status:  NPO Appropriate    Anesthesia Type: MAC.     - Reason for MAC: immobility needed, straight local not clinically adequate   Induction: Intravenous, Propofol.   Maintenance: TIVA.        Consents    Anesthesia Plan(s) and associated risks, benefits, and realistic alternatives discussed. Questions answered and patient/representative(s) expressed understanding.     - Discussed:     - Discussed with:  Patient            Postoperative Care       PONV prophylaxis: Background Propofol Infusion     Comments:               Emiliano Reeves MD    I have reviewed the pertinent notes and labs in the chart from the past 30 days and (re)examined the patient.  Any updates or changes from those notes are reflected in this note.              # Overweight: Estimated body mass index is 29.11 kg/m  as calculated from the following:    Height as of 8/19/24: 1.62 m (5' 3.78\").    Weight as of 8/19/24: 76.4 kg (168 lb 6.4 oz).      "

## 2024-08-21 NOTE — TELEPHONE ENCOUNTER
Spoke with patient about repeat MBB#1.  Patient states she is willing to move forward in the interest of appreciating relief.   Patient states that she is agreeable to going forward without additional sedation anxiolytic medications at this time.     Patient notified that 9/10/2024 appointment will be changed to repeat MBB#1.     Routing to providers and [place] as FYI only.     Reema Gaffney RN

## 2024-08-22 ENCOUNTER — TELEPHONE (OUTPATIENT)
Dept: FAMILY MEDICINE | Facility: CLINIC | Age: 49
End: 2024-08-22

## 2024-08-22 ENCOUNTER — OFFICE VISIT (OUTPATIENT)
Dept: ORTHOPEDICS | Facility: CLINIC | Age: 49
End: 2024-08-22
Payer: COMMERCIAL

## 2024-08-22 DIAGNOSIS — M65.4 DE QUERVAIN'S TENOSYNOVITIS, BILATERAL: Primary | ICD-10-CM

## 2024-08-22 PROCEDURE — 99213 OFFICE O/P EST LOW 20 MIN: CPT | Mod: 25 | Performed by: STUDENT IN AN ORGANIZED HEALTH CARE EDUCATION/TRAINING PROGRAM

## 2024-08-22 PROCEDURE — 20550 NJX 1 TENDON SHEATH/LIGAMENT: CPT | Mod: 50 | Performed by: STUDENT IN AN ORGANIZED HEALTH CARE EDUCATION/TRAINING PROGRAM

## 2024-08-22 PROCEDURE — 76942 ECHO GUIDE FOR BIOPSY: CPT | Performed by: STUDENT IN AN ORGANIZED HEALTH CARE EDUCATION/TRAINING PROGRAM

## 2024-08-22 RX ORDER — CYCLOBENZAPRINE HCL 5 MG
5-10 TABLET ORAL 3 TIMES DAILY PRN
Qty: 90 TABLET | Refills: 5 | Status: SHIPPED | OUTPATIENT
Start: 2024-08-22

## 2024-08-22 RX ADMIN — LIDOCAINE HYDROCHLORIDE 2 ML: 10 INJECTION, SOLUTION INFILTRATION; PERINEURAL at 14:38

## 2024-08-22 RX ADMIN — TRIAMCINOLONE ACETONIDE 20 MG: 40 INJECTION, SUSPENSION INTRA-ARTICULAR; INTRAMUSCULAR at 14:38

## 2024-08-22 RX ADMIN — BUPIVACAINE HYDROCHLORIDE 2 ML: 5 INJECTION, SOLUTION PERINEURAL at 14:38

## 2024-08-22 NOTE — TELEPHONE ENCOUNTER
S: Wrist Injection -Cortizone     B: Patient called asking if she is able to get her wrist injection today @ 1325 or if she should reschedule. Patient is scheduled to have hernia surgery on 08/30/2024 and does not want the wrist injection to delay that surgery.     A: Patient states ok to leave detailed message on her voicemail.  Usually there is a waiting period between injections and scheduled surgery, but varies depending on surgery/surgeon.     R: Routed message to provider to review conversation.     Stacie Selby RN on 8/22/2024 at 8:24 AM

## 2024-08-22 NOTE — LETTER
2024      Radha Beckwith  [address]  [facility] [address]      Dear Colleague,    Thank you for referring your patient, Radha Beckwith, to the [facility] [place]. Please see a copy of my visit note below.    Radha Beckwith  :  1975  DOS: 2024  MRN: [id number]  PCP: Gisselle Linn    Sports Medicine Clinic Visit    Interim History - 2024  - Last seen on 2024 for bilateral de Quervain's tenosynovitis, right worse than left.  Cervical radiculopathy does not seem to be a major component of her symptoms.  Trial of Medrol Dosepak, recommended keeping a pain log and instructed on thumb spica wrist brace proper use.   - She notes that medrol dosepak was not helpful. Continues to wear thumb spica braces. Also notes she has had bilateral C3-5 MBB and KINDRA injections also that were not helpful for wrist symptoms.  - Plan was to follow-up in 2 to 3 weeks, other medical conditions and mental health concerns delayed follow-up, which is reasonable. Consideration of deQuervain's tendon sheath injections today if needed.     - Since the last visit, she notes continued bilateral radial wrist pain. Interested in injections today.       Initial Visit: 2024  HPI  Radha Beckwith is a 48 year old female who is seen in consultation at the request of  Jenny Landrum C.N.P. presenting with bilateral hand pain.    - Mechanism of Injury:    - No inciting injury  - Pertinent history and prior evaluations:    - Hx of cervical radiculopathy and chronic low back pain.  S/p C6-C7 IL JASE, most recent in  and 2024.  Suspected of carpal tunnel syndrome and de Quervain's tenosynovitis bilaterally in the past.  - 2023 BUE EMG within normal limits without electrodiagnostic evidence of median mononeuropathies at the wrists.  Also needled the right side, which was normal.  - Seen in  and  for bilateral hand weakness. Did occupational  therapy. Was working with Dr. Pat Patterson.  Then saw Dr. Gonsalves on 6/20/2023 and 9/28/2023, has been using multiple wrist splints and Voltaren gel.  No previous injections.  Suspected probable de Quervain's tenosynovitis bilaterally and cervical radiculopathy contributing to her pain, not classic for CTS.  Recommended consideration of cervical JASE and repeat EMG if needed.    - Pain Character:    - Location:  bilateral base of thumbs into radial wrist and bilateral long fingers  - Character:  burning, pins and needles, electric shock  - Duration:  chronic  - Course:  worsening  - Endorses:    - numbness, tingling occasionally in the superficial radial nerve distribution, hand weakness, right wrist clicking, right long finger triggering, swelling in fingers  - Denies:    - instability, radicular shooting pain  - Alleviating factors:    -  thumb spica wrist braces , topicals  - Aggravating factors:    - gripping, weather changes  - Other treatments tried:    - thumb spica wrist braces (helpful but limiting what she can do with braces on), Voltaren, unable to take IBU, heat, cold, Lidocaine patches, topical     - Patient Goals:    - Referred for hand/wrist injections  - Social History:   -  Currently not working: working on getting on disability      Review of Systems  Musculoskeletal: as above  Remainder of review of systems is negative including constitutional, CV, pulmonary, GI, Skin and Neurologic except as noted in HPI or medical history.    Past Medical History:   Diagnosis Date     Abdominal pain, right lower quadrant 03/09/2008    Admit. Discharged 03/10/08     Anxiety attack 07/31/2015     Atypical chest pain 06/23/2015     De Quervain's disease (tenosynovitis)      Dehydration      Depressive disorder 1996     Gastric ulcer 07/31/2015     GERD (gastroesophageal reflux disease) 07/28/2010    Takes omeprazole and metoclopramide      Hypertension 2017     Ingrowing nail 01/09/2014     Migraines      Opioid  dependence in remission (H) 08/02/2015     Other and unspecified ovarian cyst      Papanicolaou smear of cervix with low grade squamous intraepithelial lesion (LGSIL) 07/07/2017     Past Surgical History:   Procedure Laterality Date     BIOPSY CERVICAL, LOCAL EXCISION, SINGLE/MULTIPLE N/A 8/10/2017    Procedure: BIOPSY CERVICAL, LOCAL EXCISION, SINGLE/MULTIPLE;;  Surgeon: Michael Chandler MD;  [facility]     COLONOSCOPY N/A 1/6/2023    Procedure: COLONOSCOPY;  Surgeon: Michael Dozier MD;  [facility]     COLPOSCOPY, BIOPSY, COMBINED N/A 8/10/2017    Procedure: COMBINED COLPOSCOPY, BIOPSY;  Colposcopy with Cervical Biopsies and Endometrial Biopsy, Exam with Ultrasound;  Surgeon: Michael Chandler MD;  [facility]     ESOPHAGOSCOPY, GASTROSCOPY, DUODENOSCOPY (EGD), COMBINED N/A 4/17/2017    Procedure: COMBINED ESOPHAGOSCOPY, GASTROSCOPY, DUODENOSCOPY (EGD);  Surgeon: Ibrahima Esposito MD;  [facility]     ESOPHAGOSCOPY, GASTROSCOPY, DUODENOSCOPY (EGD), COMBINED N/A 1/6/2023    Procedure: ESOPHAGOGASTRODUODENOSCOPY, WITH BIOPSY;  Surgeon: Michael Dozier MD;  [facility]     ESOPHAGOSCOPY, GASTROSCOPY, DUODENOSCOPY (EGD), COMBINED N/A 10/3/2023    Procedure: ESOPHAGOGASTRODUODENOSCOPY, WITH BIOPSY;  Surgeon: John Paul Varela MD;  [facility]     EXAM UNDER ANESTHESIA PELVIC N/A 8/10/2017    Procedure: EXAM UNDER ANESTHESIA PELVIC;;  Surgeon: Michael Chandler MD;  [facility]     HYSTERECTOMY       HYSTERECTOMY, PAP NO LONGER INDICATED       INJECT EPIDURAL CERVICAL N/A 10/20/2023    Procedure: Cervical 6-7 Interlaminar epidural steroid injection using fluoroscopic guidance with contrast dye.;  Surgeon: Trevor Ivory MD;  [facility]     INJECT EPIDURAL CERVICAL N/A 4/19/2024    Procedure: Cervical 6 - Cervical 7 Interlaminar epidural steroid injection using fluoroscopic guidance with contrast dye.;  Surgeon: Trevor Ivory MD;  [facility]      INJECT EPIDURAL LUMBAR Bilateral 2022    Procedure: Lumbar 5-Sacral 1 Transforaminal Epidural Steroid Injection with fluoroscopic guidance and contrast, bilateral;  Surgeon: Trevor Ivory MD;  [facility]     LAPAROSCOPIC CHOLECYSTECTOMY N/A 2018    Procedure: LAPAROSCOPIC CHOLECYSTECTOMY;  Laparoscopic Cholecystectomy;  Surgeon: Tigre Lowry DO;  [facility]     LAPAROSCOPIC HYSTERECTOMY TOTAL N/A 10/30/2017    Procedure: LAPAROSCOPIC HYSTERECTOMY TOTAL;  LAPAROSCOPIC HYSTERECTOMY TOTAL POSSIBLE SALPINGO-OOPHERECTOMY (BILATERAL);  Surgeon: Michael Chandler MD;  [facility]     [facility] UGI ENDOSCOPY, SIMPLE EXAM  08     Family History   Problem Relation Age of Onset     Depression Mother      Respiratory Mother      Chronic Obstructive Pulmonary Disease Mother      Hypertension Mother      Anxiety Disorder Mother      Cerebrovascular Disease Father         First stroke at age 28,  from 2nd when he was 55. Brain aneurysms.     Breast Cancer Cousin      Adrenal Disorder Other      Chronic Obstructive Pulmonary Disease Other      Asthma Brother          Objective  LMP  (LMP Unknown)     General: healthy, alert and in no acute distress.    HEENT: no scleral icterus or conjunctival erythema.   Skin: no suspicious lesions or rash. No jaundice.   CV: regular rhythm by palpation, 2+ distal pulses.  Resp: normal respiratory effort without conversational dyspnea.   Psych: normal mood and affect.    Gait: nonantalgic, appropriate coordination and balance.     Neuro:        - Sensation to light touch:    - Intact throughout the BUE including all peripheral nerve distributions.        - Special tests:   - Tinel's at the wrist:  Neg    - Tinel's at the elbow:  Neg    - Stressed Phalen's:  Neg     MSK - Wrist/Hand:        - Inspection:    - No surrounding major swelling, erythema, warmth, ecchymosis, lesion, or atrophy noted.        - ROM:    - Full AROM/PROM with pain during  thumb abduction, extension       - Palpation:    - TTP at the de Quervain's tendon sheath bilaterally, right worse than left.   - NTTP elsewhere.        - Strength:  (*antalgic)   - Forearm Pronation   5   - Forearm Supination   5   - Wrist Extension   5   - Wrist Flexion    5   - FDI     5   - ADM     5   - FPL     5   - APB     5   - EIP     5   - EDC     5   - APL/EPB    5-*            - Special tests:        - CMC grind:  Neg    - Finkelstein's:  ++Pos, R > L   - TFCC compression:  Neg    - Fulton's:  Neg       Radiology  I independently reviewed the available relevant imaging in the chart with my interpretations as above in HPI.   - XR B/L hands 6/6/2024 shows normal anatomic alignment without significant degenerative changes, no acute fractures or dislocations.    Procedure  Hand / Upper Extremity Injection/Arthrocentesis: bilateral extensor compartment 1    Date/Time: 8/22/2024 2:38 PM    Performed by: Jose Adame DO  Authorized by: Jose Adame DO    Indications:  Pain  Needle Size:  27 G  Guidance: ultrasound    Approach:  Radial  Condition: de Quervain's    Laterality:  Bilateral    Site:  Bilateral extensor compartment 1  Medications (Right):  20 mg triamcinolone 40 MG/ML; 2 mL BUPivacaine 0.5 %; 2 mL lidocaine 1 %  Medications (Left):  20 mg triamcinolone 40 MG/ML; 2 mL BUPivacaine 0.5 %; 2 mL lidocaine 1 %  Outcome:  Tolerated well, no immediate complications  Procedure discussed: discussed risks, benefits, and alternatives    Consent Given by:  Patient  Prep: patient was prepped and draped in usual sterile fashion      Procedure:   De Quervain's Tendon Sheath Injection  Consent given, and signed on chart. The area was prepped in typical sterile fashion with chlorhexidine solution. First dorsal compartment thumb extensor tendons (APL/EPB) were identified under ultrasound on long and short axis.  A 1.5 inch 27 gauge needle was placed into the first dorsal compartment tendon sheath between the APL  and EPB tendons, taking care not to inject the tendons themselves.  A mixture of 0.5ml 1% lidocaine and 0.5 ml kenalog (40mg/ml) was injected without difficulty. Ultrasound documentation of needle placement and injection was achieved.  After removal of the needle, the area was cleaned, hemostasis achieved, and bandage applied.  Patient tolerated the procedure well, no complications.      Assessment  1. De Quervain's tenosynovitis, bilateral          Plan  Radha Beckwith is a pleasant 48 year old female that presents with chronic bilateral hand pain, right worse than left.  She has multiple areas of pain, worst is in the radial wrist and thumb that hurts the most with repetitive hand motions, hand , thumb abduction/extension.  Very positive Finkelstein's maneuver today on exam.  She does have some pain elsewhere in the wrists and some numbness/tingling in the hand.  The numbness and tingling appears most consistent with superficial radial neuropathy, may have some CTS paresthesias as well.  Radiographs within normal limits without significant degenerative changes, fractures, or dislocations.  She has been trying thumb spica bracing in the past which she wears only at night, and has not helped fully, no injections in the past.  No significant radicular shooting pain throughout the arm.  History and physical exam appear most consistent with de Quervain's tenosynovitis bilaterally, right worse than left.  She does not seem to have a major component of cervical radiculopathy affecting her symptoms.    We discussed the nature of the condition and available treatment options, and mutually agreed upon the following plan:    She would like to start by trying a Medrol Dosepak to see how much pain relief she gets from the burst pack, she will keep a pain log of what pain improves and what pain does.  Instructed on proper use of the thumb spica brace which will be to use it during exacerbating activities, do not  necessarily need to wear it at night or at rest unless she finds it helpful.  She will try this new regimen for thumb spica bracing.  Also talked about more low-profile braces such as a gamekeeper brace, which she may take from clinic or purchase on her own.  Try to gamekeeper brace on in clinic and decided not to take 1 today.  She will use Voltaren gel and other topicals with Tylenol as needed for breakthrough pain.    We will follow-up in 2 to 3 weeks to evaluate how much improvement she got from the Medrol Dosepak and bracing modifications, may consider de Quervain's tendon sheath corticosteroid injection at that time if desired.  May contact clinic for questions/concerns as needed.    Updated Plan - 8/22/24:  Linette presents for follow-up of her bilateral wrist and thumb pain secondary to de Quervain's tenosynovitis.  She has undergone multiple surgical procedures including MBB/RFA and JASE which did not change the pain in the radial wrist/thumb.  She has been wearing thumb spica braces and continues to have persistent symptoms.  Interested in previously discussed de Quervain's tenosynovitis corticosteroid injections today.  Performed bilateral DQ injections in clinic without complication and immediate improvement in pain.  May follow-up in 3+ months as needed if symptoms return.  May continue to use thumb spica braces as needed.    Jose Adame DO, JORGE  [facility]  [facility] - Department of Orthopedic Surgery       Disclaimer:  This note was prepared and written using Dragon Medical dictation software. As a result, there may be errors in the script that have gone undetected. Please consider this when interpreting the information in this note.       Again, thank you for allowing me to participate in the care of your patient.        Sincerely,        Jose Adame DO

## 2024-08-27 ENCOUNTER — MYC MEDICAL ADVICE (OUTPATIENT)
Dept: SURGERY | Facility: CLINIC | Age: 49
End: 2024-08-27

## 2024-08-27 ENCOUNTER — VIRTUAL VISIT (OUTPATIENT)
Dept: PALLIATIVE MEDICINE | Facility: CLINIC | Age: 49
End: 2024-08-27
Payer: COMMERCIAL

## 2024-08-27 DIAGNOSIS — M54.2 CHRONIC NECK PAIN: ICD-10-CM

## 2024-08-27 DIAGNOSIS — M79.18 MYOFASCIAL PAIN: ICD-10-CM

## 2024-08-27 DIAGNOSIS — G89.29 CHRONIC BILATERAL LOW BACK PAIN WITH BILATERAL SCIATICA: ICD-10-CM

## 2024-08-27 DIAGNOSIS — M47.812 ARTHROPATHY OF CERVICAL FACET JOINT: Primary | ICD-10-CM

## 2024-08-27 DIAGNOSIS — M79.641 BILATERAL HAND PAIN: ICD-10-CM

## 2024-08-27 DIAGNOSIS — M54.41 CHRONIC BILATERAL LOW BACK PAIN WITH BILATERAL SCIATICA: ICD-10-CM

## 2024-08-27 DIAGNOSIS — M54.6 CHRONIC BILATERAL THORACIC BACK PAIN: ICD-10-CM

## 2024-08-27 DIAGNOSIS — M79.642 BILATERAL HAND PAIN: ICD-10-CM

## 2024-08-27 DIAGNOSIS — G89.29 CHRONIC BILATERAL THORACIC BACK PAIN: ICD-10-CM

## 2024-08-27 DIAGNOSIS — M54.42 CHRONIC BILATERAL LOW BACK PAIN WITH BILATERAL SCIATICA: ICD-10-CM

## 2024-08-27 DIAGNOSIS — G89.29 CHRONIC NECK PAIN: ICD-10-CM

## 2024-08-27 PROCEDURE — 96158 HLTH BHV IVNTJ INDIV 1ST 30: CPT | Mod: 95 | Performed by: PSYCHOLOGIST

## 2024-08-27 PROCEDURE — 96159 HLTH BHV IVNTJ INDIV EA ADDL: CPT | Mod: 95 | Performed by: PSYCHOLOGIST

## 2024-08-27 RX ORDER — TRIAMCINOLONE ACETONIDE 40 MG/ML
20 INJECTION, SUSPENSION INTRA-ARTICULAR; INTRAMUSCULAR
Status: SHIPPED | OUTPATIENT
Start: 2024-08-22

## 2024-08-27 RX ORDER — LIDOCAINE HYDROCHLORIDE 10 MG/ML
2 INJECTION, SOLUTION INFILTRATION; PERINEURAL
Status: SHIPPED | OUTPATIENT
Start: 2024-08-22

## 2024-08-27 RX ORDER — BUPIVACAINE HYDROCHLORIDE 5 MG/ML
2 INJECTION, SOLUTION PERINEURAL
Status: SHIPPED | OUTPATIENT
Start: 2024-08-22

## 2024-08-27 NOTE — PROGRESS NOTES
Radha Beckwith is a 48 year old female who is being evaluated via a billable video visit.      Patient is currently in the [facility]? yes    Patient would like the video invitation sent by: Text to cell phone: [phone]}    Video Start Time: 11:01 AM  Video Stop Time: 11:55 AM    Additional provider notes:     Pain Diagnoses per pain provider:   Arthropathy of cervical facet joint     Chronic neck pain     Myofascial pain     Chronic bilateral thoracic back pain     Chronic bilateral low back pain with bilateral sciatica     Bilateral hand pain                        DATA: During today's visit you reported the following: Your chronic pain is unchanged, neck remains primary area of concern. Your mood is more anxious - continuing to address within context of individual therapy. Your activity level is about the same. Your stress level is higher than you'd like - surgery, relationship with son, niece's mother requesting photos 'stat' for wedding. Your sleep is 'awful again' - could be related to surgery coming up and worry thoughts. You reported engaging in self-care for your pain infrequently.    You identified that you would like to focus on the following or had questions regarding the following issues or concerns, and we discussed the following:   - getting prepped for hernia surgery in a few days  - lost power while getting meals prepped for surgery, 'kitchen is a disaster'  - mildly estranged from brother and niece who is getting , they are asking for photos for her wedding  - discussed you get to set limits with others and prioritize your own care  - ongoing conflict with son  - discussed avoiding 'rabbit hole' of Googling what to expect after hernia repair surgery  - discussed cultivating some things to do post-operatively     ASSESSMENT: Linette reports stress related to upcoming surgery and family demands. She continues to find benefit with her individual therapist, and is working on  increased ability to engage in self-care for herself first.     PLAN:   Your next appointment is scheduled for 9/10 at 11:00 AM.  Assignment/Objectives /interventions for next session:   - put your 'oxygen mask' on first before assisting others    We believe regular attendance is key to your success in our program!    Any time you are unable to keep your appointment we ask that you call us at [phone] at least 24 hours in advance to cancel.This will allow us to offer the appointment time to another patient.   Multiple missed appointments may lead to dismissal from the clinic.    Telemedicine Visit: The patient's condition can be safely assessed and treated via synchronous audio and visual telemedicine encounter.      Reason for Telemedicine Visit: Services only offered telehealth    Originating Site (Patient Location): Patient's home    Distant Site (Provider Location): Provider Remote Setting- Home Office    Consent:  The patient/guardian has verbally consented to: the potential risks and benefits of telemedicine (video visit) versus in person care; bill my insurance or make self-payment for services provided; and responsibility for payment of non-covered services.     Mode of Communication:  Video Conference via [employer]    As the provider I attest to compliance with applicable laws and regulations related to telemedicine.      Karen Kohler PsyD LP  Licensed Psychologist  Outpatient Clinic Therapist  [facility]

## 2024-08-28 ENCOUNTER — VIRTUAL VISIT (OUTPATIENT)
Dept: PALLIATIVE MEDICINE | Facility: CLINIC | Age: 49
End: 2024-08-28
Payer: COMMERCIAL

## 2024-08-28 ENCOUNTER — VIRTUAL VISIT (OUTPATIENT)
Dept: PSYCHOLOGY | Facility: CLINIC | Age: 49
End: 2024-08-28
Payer: COMMERCIAL

## 2024-08-28 DIAGNOSIS — M54.41 CHRONIC BILATERAL LOW BACK PAIN WITH BILATERAL SCIATICA: ICD-10-CM

## 2024-08-28 DIAGNOSIS — G89.29 CHRONIC BILATERAL THORACIC BACK PAIN: ICD-10-CM

## 2024-08-28 DIAGNOSIS — M47.812 SPONDYLOSIS OF CERVICAL REGION WITHOUT MYELOPATHY OR RADICULOPATHY: ICD-10-CM

## 2024-08-28 DIAGNOSIS — F41.1 GENERALIZED ANXIETY DISORDER: ICD-10-CM

## 2024-08-28 DIAGNOSIS — G89.29 CHRONIC BILATERAL LOW BACK PAIN WITH BILATERAL SCIATICA: ICD-10-CM

## 2024-08-28 DIAGNOSIS — M54.2 CHRONIC NECK PAIN: ICD-10-CM

## 2024-08-28 DIAGNOSIS — G89.29 CHRONIC NECK PAIN: ICD-10-CM

## 2024-08-28 DIAGNOSIS — M79.18 MYOFASCIAL PAIN: ICD-10-CM

## 2024-08-28 DIAGNOSIS — F33.1 MODERATE EPISODE OF RECURRENT MAJOR DEPRESSIVE DISORDER (H): Primary | ICD-10-CM

## 2024-08-28 DIAGNOSIS — G89.29 CENTRAL SENSITIZATION TO PAIN: ICD-10-CM

## 2024-08-28 DIAGNOSIS — M54.42 CHRONIC BILATERAL LOW BACK PAIN WITH BILATERAL SCIATICA: ICD-10-CM

## 2024-08-28 DIAGNOSIS — M47.812 ARTHROPATHY OF CERVICAL FACET JOINT: Primary | ICD-10-CM

## 2024-08-28 DIAGNOSIS — M54.6 CHRONIC BILATERAL THORACIC BACK PAIN: ICD-10-CM

## 2024-08-28 PROCEDURE — 99214 OFFICE O/P EST MOD 30 MIN: CPT | Mod: 95

## 2024-08-28 PROCEDURE — 90837 PSYTX W PT 60 MINUTES: CPT | Mod: 95 | Performed by: COUNSELOR

## 2024-08-28 RX ORDER — DULOXETIN HYDROCHLORIDE 60 MG/1
60 CAPSULE, DELAYED RELEASE ORAL 2 TIMES DAILY
Qty: 180 CAPSULE | Refills: 0 | Status: SHIPPED | OUTPATIENT
Start: 2024-08-28

## 2024-08-28 ASSESSMENT — PAIN SCALES - PAIN ENJOYMENT GENERAL ACTIVITY SCALE (PEG)
INTERFERED_GENERAL_ACTIVITY: 8
INTERFERED_ENJOYMENT_LIFE: 8
PEG_TOTALSCORE: 7.33
AVG_PAIN_PASTWEEK: 6

## 2024-08-28 ASSESSMENT — ANXIETY QUESTIONNAIRES
7. FEELING AFRAID AS IF SOMETHING AWFUL MIGHT HAPPEN: NEARLY EVERY DAY
2. NOT BEING ABLE TO STOP OR CONTROL WORRYING: NEARLY EVERY DAY
GAD7 TOTAL SCORE: 21
8. IF YOU CHECKED OFF ANY PROBLEMS, HOW DIFFICULT HAVE THESE MADE IT FOR YOU TO DO YOUR WORK, TAKE CARE OF THINGS AT HOME, OR GET ALONG WITH OTHER PEOPLE?: EXTREMELY DIFFICULT
5. BEING SO RESTLESS THAT IT IS HARD TO SIT STILL: NEARLY EVERY DAY
GAD7 TOTAL SCORE: 21
1. FEELING NERVOUS, ANXIOUS, OR ON EDGE: NEARLY EVERY DAY
GAD7 TOTAL SCORE: 21
7. FEELING AFRAID AS IF SOMETHING AWFUL MIGHT HAPPEN: NEARLY EVERY DAY
6. BECOMING EASILY ANNOYED OR IRRITABLE: NEARLY EVERY DAY
4. TROUBLE RELAXING: NEARLY EVERY DAY
3. WORRYING TOO MUCH ABOUT DIFFERENT THINGS: NEARLY EVERY DAY
IF YOU CHECKED OFF ANY PROBLEMS ON THIS QUESTIONNAIRE, HOW DIFFICULT HAVE THESE PROBLEMS MADE IT FOR YOU TO DO YOUR WORK, TAKE CARE OF THINGS AT HOME, OR GET ALONG WITH OTHER PEOPLE: EXTREMELY DIFFICULT

## 2024-08-28 ASSESSMENT — PAIN SCALES - GENERAL: PAINLEVEL: SEVERE PAIN (7)

## 2024-08-28 NOTE — PROGRESS NOTES
Answers submitted by the patient for this visit:  Patient Health Questionnaire (Submitted on 8/28/2024)  If you checked off any problems, how difficult have these problems made it for you to do your work, take care of things at home, or get along with other people?: Extremely difficult  PHQ9 TOTAL SCORE: 22  Patient Health Questionnaire (G7) (Submitted on 8/28/2024)  BO 7 TOTAL SCORE: 21        [facility]                                     Progress Note    Patient Name: Radha Beckwith  Date: 8/28/24         Service Type: Individual      Session Start Time: 2:05pm Session End Time: 3:00pm     Session Length:  55    Session #: 49    Attendees: Client    Service Modality:  Video      Provider verified identity through the following two step process.  Patient provided:  Patient is known previously to provider    Telemedicine Visit: The patient's condition can be safely assessed and treated via synchronous audio and visual telemedicine encounter.      Reason for Telemedicine Visit: Patient convenience (e.g. access to timely appointments / distance to available provider)    Originating Site (Patient Location): Patient's home    Distant Site (Provider Location): Provider Remote Setting- Home Office    Consent:  The patient/guardian has verbally consented to: the potential risks and benefits of telemedicine (video visit) versus in person care; bill my insurance or make self-payment for services provided; and responsibility for payment of non-covered services.     Patient would like the video invitation sent by:  My Chart    Mode of Communication:  video    Distant Location (Provider):  On-site    As the provider I attest to compliance with applicable laws and regulations related to telemedicine.    DATA  Interactive Complexity: No  Crisis: No        Progress Since Last Session (Related to Symptoms / Goals / Homework):   Symptoms: Improving .    Homework: Completed in session      Episode of Care  Goals: Satisfactory progress - MAINTENANCE (Working to maintain change, with risk of relapse); Intervened by continuing to positively reinforce healthy behavior choice      Current / Ongoing Stressors and Concerns:   The client stated she had her pre-op appointment last week and that went okay.   Had the endoscopy and it was more complex than she knew.   Had some issues/arguments with her boyfriend this week. Stated he said some really mean things to her. She stated she found out that her boyfriend tends to drink while driving often.   Has her surgery on Friday.     Treatment Objective(s) Addressed in This Session:   Increase interest, engagement, and pleasure in doing things  Feel less tired and more energy during the day        Intervention:   Processed her endoscopy procedure and how it wasn't what she expected. Focused on the instances with her boyfriend. Continued to talk about how the client can try to communicate her needs and boundaries with him. Validated any fears she's having about Friday's surgery.     Assessments completed prior to visit:  The following assessments were completed by patient for this visit:  PHQ9:       7/3/2024     1:59 PM 7/11/2024     9:40 AM 7/25/2024    11:51 AM 8/4/2024     7:19 PM 8/13/2024     5:34 PM 8/18/2024    11:34 AM 8/28/2024     2:05 PM   PHQ-9 SCORE   PHQ-9 Total Score MyChart 23 (Severe depression) 22 (Severe depression) 21 (Severe depression) 21 (Severe depression) 23 (Severe depression) 22 (Severe depression) 22 (Severe depression)   PHQ-9 Total Score 23 22 21 21 23 22 22     GAD7:       5/2/2024     1:11 PM 5/23/2024     5:47 AM 6/12/2024     4:08 PM 7/1/2024     2:51 PM 7/25/2024    11:52 AM 8/13/2024     5:35 PM 8/28/2024     2:06 PM   BO-7 SCORE   Total Score 21 (severe anxiety) 21 (severe anxiety) 21 (severe anxiety) 19 (severe anxiety) 21 (severe anxiety) 19 (severe anxiety) 21 (severe anxiety)   Total Score 21    21 21 21 19 21 19    19 21     PROMIS 10-Global  Health (all questions and answers displayed):       4/10/2024     1:58 PM 4/17/2024     1:28 PM 7/25/2024    11:56 AM 8/4/2024     7:20 PM 8/10/2024     2:51 PM 8/18/2024    11:36 AM 8/28/2024     2:07 PM   PROMIS 10   In general, would you say your health is: Poor Poor Poor Poor Fair Poor Poor   In general, would you say your quality of life is: Poor Poor Fair Poor Poor Fair Fair   In general, how would you rate your physical health? Poor Poor Poor Poor Poor Poor Fair   In general, how would you rate your mental health, including your mood and your ability to think? Poor Poor Poor Poor Poor Poor Poor   In general, how would you rate your satisfaction with your social activities and relationships? Poor Poor Poor Fair Poor Fair Fair   In general, please rate how well you carry out your usual social activities and roles Fair Poor Poor Poor Poor Fair Fair   To what extent are you able to carry out your everyday physical activities such as walking, climbing stairs, carrying groceries, or moving a chair? A little A little A little Moderately A little A little A little   In the past 7 days, how often have you been bothered by emotional problems such as feeling anxious, depressed, or irritable? Always Always Always Always Always Always Always   In the past 7 days, how would you rate your fatigue on average? Severe Very severe Severe Severe Very severe Very severe Severe   In the past 7 days, how would you rate your pain on average, where 0 means no pain, and 10 means worst imaginable pain? 7 7 7 7 7 7 7   In general, would you say your health is: 1 1 1 1 2 1 1   In general, would you say your quality of life is: 1 1 2 1 1 2 2   In general, how would you rate your physical health? 1 1 1 1 1 1 2   In general, how would you rate your mental health, including your mood and your ability to think? 1 1 1 1 1 1 1   In general, how would you rate your satisfaction with your social activities and relationships? 1 1 1 2 1 2 2   In  general, please rate how well you carry out your usual social activities and roles. (This includes activities at home, at work and in your community, and responsibilities as a parent, child, spouse, employee, friend, etc.) 2 1 1 1 1 2 2   To what extent are you able to carry out your everyday physical activities such as walking, climbing stairs, carrying groceries, or moving a chair? 2 2 2 3 2 2 2   In the past 7 days, how often have you been bothered by emotional problems such as feeling anxious, depressed, or irritable? 5 5 5 5 5 5 5   In the past 7 days, how would you rate your fatigue on average? 4 5 4 4 5 5 4   In the past 7 days, how would you rate your pain on average, where 0 means no pain, and 10 means worst imaginable pain? 7 7 7 7 7 7 7   Global Mental Health Score 4    4 4    4 5    5 5 4    4 6 6   Global Physical Health Score 7    7 6    6 7    7 8 6    6 6 8   PROMIS TOTAL - SUBSCORES 11    11 10    10 12    12 13 10    10 12 14     [place] Suicide Severity Rating Scale (Lifetime/Recent)      10/4/2022    11:00 AM 5/13/2024     3:13 PM 8/21/2024    10:30 AM   [place] Suicide Severity Rating (Lifetime/Recent)   Q1 Wished to be Dead (Past Month) no 0-->no 0-->no   Q2 Suicidal Thoughts (Past Month) no 0-->no 0-->no   Q3 Suicidal Thought Method no     Q4 Suicidal Intent without Specific Plan no     Q5 Suicide Intent with Specific Plan yes     Q6 Suicide Behavior (Lifetime) yes 0-->no 0-->no   If yes to Q6, within past 3 months? no     Level of Risk per Screen high risk no risks indicated no risks indicated         ASSESSMENT: Current Emotional / Mental Status (status of significant symptoms):   Risk status (Self / Other harm or suicidal ideation)   Patient denies current fears or concerns for personal safety.   Patient denies current or recent suicidal ideation or behaviors.   Patient denies current or recent homicidal ideation or behaviors.   Patient denies current or recent self injurious behavior or  ideation.   Patient denies other safety concerns.   Patient reports there has been no change in risk factors since their last session.     Patient reports there has been no change in protective factors since their last session.     Recommended that patient call 911 or go to the local ED should there be a change in any of these risk factors.     Appearance:   appropriate    Eye Contact:   good    Psychomotor Behavior: Normal    Attitude:   Cooperative    Orientation:   All   Speech    Rate / Production: Normal/ Responsive Normal     Volume:  Normal    Mood:    Normal   Affect:    Normal    Thought Content:  Clear    Thought Form:  Coherent    Insight:    Good      Medication Review:   No changes to current psychiatric medication(s)     Medication Compliance:   Yes     Changes in Health Issues:   None reported     Chemical Use Review:   Substance Use: Chemical use reviewed, no active concerns identified      Tobacco Use: No change in amount of tobacco use since last session.  Patient declined discussion at this time    Diagnosis:  1. Moderate episode of recurrent major depressive disorder (H)    2. Generalized anxiety disorder            Collateral Reports Completed:   Not Applicable    PLAN: (Patient Tasks / Therapist Tasks / Other)  Continue to continue to work on self care and communication skills.         Ricarda Bhatia, [facility]                                                         ______________________________________________________________________    Individual Treatment Plan    Patient's Name: Radha Beckwith  YOB: 1975    Date of Creation: 6/28/23  Date Treatment Plan Last Reviewed/Revised: 8/14/24    DSM5 Diagnoses: 296.32 (F33.1) Major Depressive Disorder, Recurrent Episode, Moderate _  Psychosocial / Contextual Factors: living with boyfriend, memory issues  PROMIS (reviewed every 90 days):     Referral / Collaboration:  Referral to another professional/service is not indicated at this  time..    Anticipated number of session for this episode of care: 9-12 sessions  Anticipation frequency of session:  as needed  Anticipated Duration of each session: 38-52 minutes  Treatment plan will be reviewed in 90 days or when goals have been changed.       MeasurableTreatment Goal(s) related to diagnosis / functional impairment(s)  Goal 1: Patient will increase communication skills with family members.    Objective #A (Patient Action)    Patient will learn & utilize at least 2 assertive communication skills weekly.  Status: Continued - Date(s): 8/14/24    Intervention(s)  Therapist will teach emotional regulation skills. distress tolerance skills, interpersonal effectiveness skills, emotion regluation skills, mindfulness skills, radical acceptance. Therapist will teach client how to ID body cues for anxiety, anxiety reduction techniques, how to ID triggers for depression and anxiety- decrease reactivity/eliminate, lifestyle changes to reduce depression and anxiety, communication skills, explore cognitive beliefs and help client to develop healthy cognitive patterns and beliefs.    Objective #B  Patient will use thought-stopping strategy daily to reduce intrusive thoughts.  Status: Continued - Date(s): 8/14/24    Intervention(s)  Therapist will teach emotional regulation skills. distress tolerance skills, interpersonal effectiveness skills, emotion regluation skills, mindfulness skills, radical acceptance. Therapist will teach client how to ID body cues for anxiety, anxiety reduction techniques, how to ID triggers for depression and anxiety- decrease reactivity/eliminate, lifestyle changes to reduce depression and anxiety, communication skills, explore cognitive beliefs and help client to develop healthy cognitive patterns and beliefs.      Goal 2: Patient will decrease depression symptoms.      Objective #A (Patient Action)    Status: Continued - Date(s): 8/14/24    Patient will Increase interest, engagement,  and pleasure in doing things  Decrease frequency and intensity of feeling down, depressed, hopeless  Improve quantity and quality of night time sleep / decrease daytime naps  Feel less tired and more energy during the day   Improve diet, appetite, mindful eating, and / or meal planning  Identify negative self-talk and behaviors: challenge core beliefs, myths, and actions  Improve concentration, focus, and mindfulness in daily activities   Feel less fidgety, restless or slow in daily activities / interpersonal interactions.    Intervention(s)  Therapist will teach emotional regulation skills. distress tolerance skills, interpersonal effectiveness skills, emotion regluation skills, mindfulness skills, radical acceptance. Therapist will teach client how to ID body cues for anxiety, anxiety reduction techniques, how to ID triggers for depression and anxiety- decrease reactivity/eliminate, lifestyle changes to reduce depression and anxiety, communication skills, explore cognitive beliefs and help client to develop healthy cognitive patterns and beliefs.    Objective #B  Patient will identify three distraction and diversion activities and use those activities to decrease level of anxiety  .    Status: Continued - Date(s):  8/14/24    Intervention(s)  Therapist will teach emotional regulation skills. distress tolerance skills, interpersonal effectiveness skills, emotion regluation skills, mindfulness skills, radical acceptance. Therapist will teach client how to ID body cues for anxiety, anxiety reduction techniques, how to ID triggers for depression and anxiety- decrease reactivity/eliminate, lifestyle changes to reduce depression and anxiety, communication skills, explore cognitive beliefs and help client to develop healthy cognitive patterns and beliefs.      Patient has reviewed and agreed to the above plan.      Ricarda Bhatia [facility]

## 2024-08-28 NOTE — PROGRESS NOTES
Is Pt currently in [place]? Yes    NOTE:  If Pt is not in [place], Appointment needs to be canceled and rescheduled.  Linette is a 48 year old who is being evaluated via a billable video visit.    How would you like to obtain your AVS? MyChart  If the video visit is dropped, the invitation should be resent by: Text to cell phone: [phone]  Will anyone else be joining your video visit? No      Linsey Mills MA    Video-Visit Details    Type of service:  Video Visit   Originating Location (pt. Location): Home    Distant Location (provider location):  On-site  Platform used for Video Visit: [facility]     Date of visit: 8/28/2024      Assessment:   Radha Beckwith is a 48 year old female with a past medical history significant for chronic bilateral low back pain, depression, SVT, opioid dependence in remission, anxiety, tobacco use, GERD, restless legs who presents with complaints of neck pain, mid and low back.      Low back pain - Onset of pain within last 2 years. Pain is mostly axial, though she does have intermittent radicular leg symptoms, occasionally extends below the knee. On exam, positive facet loading and positive KATHY, sacral thrust and Yeoman's bilaterally. Positive myofascial TTP, negative SLR and neuro exam WNL. Etiology is likely associated with multiple factors, including underlying degenerative changes of lumbar spine, facet and SIJ mediated components, with prominent overlying myofascial component.   Neck/thoracic back pain - Onset of pain within last 2 years. On exam, positive for myofascial TTP, most notably in lower thoracic paraspinals. Etiology is likely associated with multiple factors, including underlying degenerative changes, with prominent overlying myofascial component.      Assigned to [place] nursing team.     Visit Diagnoses:  1. Arthropathy of cervical facet joint    2. Chronic neck pain    3. Chronic bilateral low back pain with bilateral  sciatica    4. Myofascial pain    5. Chronic bilateral thoracic back pain    6. Spondylosis of cervical region without myelopathy or radiculopathy    7. 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  Self-care instructions given.  I am recommending a multidisciplinary treatment plan to help this patient better manage their pain.      Pain Physical Therapy:  Recent PT for low back/SI and neck pain. Continue therapy and activity as tolerated at home.      Pain Psychologist to address relaxation, behavioral change, coping style, and other factors important to improvement.  YES - continue working with Karen Kohler PhD.      Diagnostic Studies:  None      Medication Management:   Duloxetine -  Current dose 60 mg in morning and 30 mg at bedtime. Appreciates benefit without side effects, the pain control optimized.  Recommend dose increase to 60 mg twice daily, monitor for enhanced pain benefit.  Updated prescription sent to pharmacy today.  Gabapentin tapered and she discontinued 300 mg dose in the morning since last visit.  Of note, she requested to taper off medication due to concerns for weight gain.  She reports 20 pound weight loss since stopping gabapentin.  Muscle relaxants:   Continue baclofen 20 mg 3 times daily.  Reports enhanced effect with adding afternoon dose, mild daytime sedation effects are manageable.  Of note, she reports recently starting buspirone 3 times daily, which may also be contributing to CNS depression effects.  Recommended taking baclofen and buspirone at separate times.   Flexeril - 5-10 mg twice daily as needed. PCP managing. Recommend reducing use to once daily as needed if helpful on days with increased pain.   Allow 4-6 hours in between all muscle relaxant doses, do not mix with alcohol.   [place] - [place] at last visit on 1/3/24. No clear benefit for pain with daytime  products, some benefit at night with products used at bedtime.       Potential procedures:   Bilateral cervical 3,4,5 medial branch nerve block #1 on 6/27/24. She was not able to evaluate pain relief accurately due to benzo use for anxiety. MBB #2 scheduled on 9/10/24.  Pending outcome with second block, we will consider repeat MBB #1 if appropriate/indicated.  I recommended she discuss results directly with nursing team and compare outcome to first block.   Advised that we may encounter insurance coverage issues with repeat MBB #1, though I do think we have reasonable grounds for appeal if denied due to benzodiazepine use for first block, which affected her cognitive functioning/ability to fairly evaluate results, and utilizing local anesthetic only for the second block     Other Orders/Referrals:   Recommend using TENS unit a few times throughout the day as needed, particularly when engaging in activities that may increase pain.      Follow up with JEANNIE Schroeder CNP in around 6 weeks for medication management, video visit okay.       Review of Electronic Chart: Today I have also reviewed available medical information in the patient's medical record at [facility] ([facility]) and Care Everywhere (if available), including relevant provider notes, laboratory work, and imaging.     Jenny Landrum DNP, JEANNIE, AGNP-C  [facility]     -------------------------------------------------    Subjective:    Chief complaint:   Chief Complaint   Patient presents with    Pain       Interval history:  Radha Beckwith is a 48 year old female last seen on 5/29/24.  They are a patient of mine seen in follow up.     Recommendations/plan at the last visit included:  Pain Physical Therapy:  Currently engaging in PT for low back/SI and neck pain. Continue therapy and activity as tolerated at home.      Pain Psychologist to address relaxation, behavioral change, coping style, and other factors important to  improvement.  YES - referral to Karen Kohler PhD for mindfulness and pain coping strategies.      Diagnostic Studies:  None      Medication Management:   Duloxetine -  Current dose 60 mg in morning and 30 mg at bedtime. Appreciates benefit, no new/significant concerns for side effects.   Gabapentin - 300 mg in morning. Discussed taper down/off at last visit, determined she appreciates benefit with morning dose, no side effects. Continue with current dose.   Muscle relaxants:   Continue baclofen 20 mg twice daily.  Appreciates some degree of benefit. No side effects. Recommend adding third dose, start 10 mg in afternoon x 5 days, then increase to 20 mg three times daily. Monitor for enhanced benefit, cautioned about potential sedation effects.   Flexeril - 5-10 mg twice daily as needed. PCP managing. Recommend reducing use to once daily as needed if helpful on days with increased pain.   Allow 4-6 hours in between all muscle relaxant doses, do not mix with alcohol.   [place] at last visit on 1/3/24. No clear benefit for pain with daytime products, some benefit at night with products used at bedtime.       Potential procedures:   Cervical JASE scheduled with Dr. Ivory on 4/19/24, reports benefit for 1-2 days after injection, then pain returned to baseline.   bilateral cervical 3,4,5 medial branch nerve block #1 ordered today, with plans to proceed to radiofrequency ablation. Positive bilateral cervical facet loading with rotation and rotation/extension on exam.   Referral to Sports Medicine for wrist/hand injection evaluation. Scheduling phone number is [phone].      Other Orders/Referrals:   Recommend using TENS unit a few times throughout the day as needed, particularly when engaging in activities that may increase pain.      Follow up with JEANNIE Schroeder CNP in around 6-8 weeks, or sooner if needed.     Since her last visit, Radha Beckwith  "reports:  -She states her pain has been \"crappy\" since last visit.   -She was referred to Karen Kohler for pain psych. She has started with   -She had CMBB #1 on 6/27, took benzo for anxiety and fell asleep after the injection. Not able to assess pain relief accurately. She has second block coming up on 9/10, will not be taking benzo. Pending outcome with injection, will consider repeat MBB #1.   -Duloxetine dose 60 mg in AM and 30 mg in evening. She appreciates benefit with sciatica pain, less intense symptoms.   -She stopped gabapentin 300 mg in morning since last visit. She reports 20 pound weight loss since stopping medication. She had reported concerns for weight gain on gabapentin.   -She has hernia surgery on 8/30.   -She is interested in dose increase of duloxetine to 60 mg BID.   -She reports having more muscle spasms in low back, takes muscle relaxants.   -Baclofen dose 20 mg TID. She appreciates benefit with use.       PEG: A Three-Item Scale Assessing Pain Intensity and Interference    What number best describes your PAIN ON AVERAGE in the past week? 6    What number best describes how, during the past week, pain has interfered with your ENJOYMENT OF LIFE? 8    What number best describes how, during the past week, pain has interfered with your GENERAL ACTIVITY? 8    PEG Total Score: 7.33    Fabian VERA, Hayden KA, Karie BEAVERS, Anjana TA, Henri J, Joyce CHI, Ban SM, Brandie K. Development and initial validation of the PEG, a 3-item scale assessing pain intensity and interference. Journal of General Internal Medicine. 2009 [place];24:733-738.        Interval history from last visit on 5/29/24:  -Her pain overall is about the same as last visit, no significant changes in pain symptoms.   -She is trying to find a new PCP right now, does not feel like she has a good dynamic with provider anymore.   -She had a fight with boyfriend last night, states she does not think she has anything wrong with her. This is very " invalidating for her and difficult to process.   -She reports seeing a MH counselor now, which has been helpful for supporting her chronic health conditions.   -Cervical JASE recently on 4/19/24 with Dr. Ivory, benefit x 1-2 days but then pain returned to baseline.   -She reports pain goes all the way down into spine.   -She reports pain and numbness radiating into jaw after lying down for a period of time, this pain occurs when she tries to get up.   -She has recently seen Dr. Rosario for SI dysfunction.   -She is open to trial TID dosing with baclofen.   -Also takes cyclobenzaprine PRN.  She continues to have neck pain notably with rotation and extension.   -She is interested in CMBB/RFA.   Pain Information:              Pain rating: averages 7/10 on a 0-10 scale.      Current Pain Treatments:    Medications:               Suboxone 2-0.5 mg daily (weaning off)              Duloxetine 60 mg BID              Ropinirole 1 mg at bedtime               Cyclobenzaprine 5-10 mg QD PRN               Baclofen 20 mg TID                   2. Other therapies:               TENS              PT - low back/SI and recent referral for neck pain             CMBB/RFA - MBB #2 on 9/10/24        Current MME: N/A     Review of [place] Prescription Monitoring Program (): No concern for abuse or misuse of controlled medications based on this report. Reviewed - appears appropriate.      Past pain treatments:  SI joint injection 5/16/23 -significant benefit x1 month, then diminishing efficacy  KINDRA on 4/19/24 with Dr. Ivory - marginal benefit   Gabapentin     Medications:  Current Outpatient Medications   Medication Sig Dispense Refill    acetaminophen (TYLENOL) 500 MG tablet Take 1,000 mg by mouth every 6 hours as needed for mild pain      ALPRAZolam (XANAX) 0.5 MG tablet       baclofen (LIORESAL) 20 MG tablet Take 1 tablet (20 mg) by mouth 3 times daily 90 tablet 1    bisacodyl (DULCOLAX) 5 MG EC tablet Take 1 tablet (5 mg) by mouth  every other day Take every other day. If no bowel movement for 2 or more days, take 2 tablets of bisacodyl (10 mg) 30 tablet 2    buprenorphine HCl-naloxone HCl (SUBOXONE) 2-0.5 MG per film Place 0.5 Film under the tongue daily       busPIRone HCl (BUSPAR) 30 MG tablet 30 mg 3 times daily      CONSTULOSE 10 GM/15ML solution TAKE 15 MLS (10 G) BY MOUTH 3 TIMES DAILY AS NEEDED FOR CONSTIPATION (AS NEEDED FOR SEVERE CONSTIPAITON, NO BM FOR MORE THAN 3 DAYS AND FEELING CONSTIPATED) 300 mL 3    cyclobenzaprine (FLEXERIL) 5 MG tablet TAKE ONE TO TWO TABLETS BY MOUTH THREE TIMES A DAY AS NEEDED FOR MUSCLE SPASMS 90 tablet 5    DULoxetine (CYMBALTA) 60 MG capsule Take 1 capsule (60 mg) by mouth 2 times daily. 180 capsule 0    famotidine (PEPCID) 40 MG tablet Take 1 tablet (40 mg) by mouth 2 times daily 60 tablet 1    linaclotide (LINZESS) 72 MCG capsule Take 1 capsule (72 mcg) by mouth every morning (before breakfast) 30 capsule 1    medical cannabis (Patient's own supply) See Admin Instructions. Has [place] Medical [place] Card- Purchases through [facility].   (The purpose of this order is to document that the patient reports taking medical cannabis.  This is not a prescription, and is not used to certify that the patient has a qualifying medical condition.)      ondansetron (ZOFRAN ODT) 4 MG ODT tab DISSOLVE ONE TABLET ON TONGUE EVERY 6 HOURS AS NEEDED FOR NAUSEA 20 tablet 1    pantoprazole (PROTONIX) 40 MG EC tablet TAKE ONE TABLET BY MOUTH TWICE A DAY WITH MEALS 60 tablet 5    propranolol (INDERAL) 10 MG tablet       rOPINIRole (REQUIP) 1 MG tablet TAKE ONE TABLET BY MOUTH EVERY NIGHT AT BEDTIME 90 tablet 0    simvastatin (ZOCOR) 10 MG tablet TAKE ONE TABLET BY MOUTH EVERY NIGHT AT BEDTIME 90 tablet 1    temazepam (RESTORIL) 15 MG capsule          Medical History: any changes in medical history since they were last seen? No      Objective:    Physical Exam:  not currently breastfeeding.  GENERAL: alert and no  distress  EYES: Eyes grossly normal to inspection.  No discharge or erythema, or obvious scleral/conjunctival abnormalities.  RESP: No audible wheeze, cough, or visible cyanosis.    SKIN: Visible skin clear. No significant rash, abnormal pigmentation or lesions.  NEURO: Cranial nerves grossly intact.  Mentation and speech appropriate for age.  PSYCH: Appropriate affect, tone, and pace of words      Diagnostic Tests/Imaging/Labs:  BMP on 1/5/24 - GFR >90    BILLING TIME DOCUMENTATION:   The total TIME spent on this patient on the date of the encounter/appointment was 36 minutes.      TOTAL TIME includes:   Time spent preparing to see the patient (reviewing records and tests)   Time spent face to face (or over the phone) with the patient   Time spent ordering tests, medications, procedures and referrals   Time spent Referring and communicating with other healthcare professionals   Time spent documenting clinical information in Epic

## 2024-08-28 NOTE — PATIENT INSTRUCTIONS
Pain Physical Therapy:  Recent PT for low back/SI and neck pain. Continue therapy and activity as tolerated at home.      Pain Psychologist to address relaxation, behavioral change, coping style, and other factors important to improvement.  YES - continue working with Karen Kohler PhD.      Diagnostic Studies:  None      Medication Management:   Duloxetine -  Current dose 60 mg in morning and 30 mg at bedtime. Appreciates benefit without side effects, the pain control optimized.  Recommend dose increase to 60 mg twice daily, monitor for enhanced pain benefit.  Updated prescription sent to pharmacy today.  Gabapentin tapered and she discontinued 300 mg dose in the morning since last visit.  Of note, she requested to taper off medication due to concerns for weight gain.  She reports 20 pound weight loss since stopping gabapentin.  Muscle relaxants:   Continue baclofen 20 mg 3 times daily.  Reports enhanced effect with adding afternoon dose, mild daytime sedation effects are manageable.  Of note, she reports recently starting buspirone 3 times daily, which may also be contributing to CNS depression effects.  Recommended taking baclofen and buspirone at separate times.   Flexeril - 5-10 mg twice daily as needed. PCP managing. Recommend reducing use to once daily as needed if helpful on days with increased pain.   Allow 4-6 hours in between all muscle relaxant doses, do not mix with alcohol.   [place] at last visit on 1/3/24. No clear benefit for pain with daytime products, some benefit at night with products used at bedtime.       Potential procedures:   Bilateral cervical 3,4,5 medial branch nerve block #1 on 6/27/24. She was not able to evaluate pain relief accurately due to benzo use for anxiety. MBB #2 scheduled on 9/10/24.  Pending outcome with second block, we will consider repeat MBB #1 if appropriate/indicated.  I recommended she discuss results directly  with nursing team and compare outcome to first block.   Advised that we may encounter insurance coverage issues with repeat MBB #1, though I do think we have reasonable grounds for appeal if denied due to benzodiazepine use for first block, which affected her cognitive functioning/ability to fairly evaluate results, and utilizing local anesthetic only for the second block     Other Orders/Referrals:   Recommend using TENS unit a few times throughout the day as needed, particularly when engaging in activities that may increase pain.      Follow up with JEANNIE Schroeder CNP in around 6 weeks for medication management, video visit okay.     ----------------------------------------------------------------  Clinic Number:  [phone]   Call with any questions about your care and for scheduling assistance.   Calls are returned Monday through Friday between 8 AM and 4:30 PM. We usually get back to you within 2 business days depending on the issue/request.    If we are prescribing your medications:  For opioid medication refills, call the clinic or send a Year Up message 7 days in advance.  Please include:  Name of requested medication  Name of the pharmacy.  For non-opioid medications, call your pharmacy directly to request a refill. Please allow 3-4 days to be processed.   Per [place] State Law:  All controlled substance prescriptions must be filled within 30 days of being written.    For those controlled substances allowing refills, pickup must occur within 30 days of last fill.      We believe regular attendance is key to your success in our program!    Any time you are unable to keep your appointment we ask that you call us at least 24 hours in advance to cancel.This will allow us to offer the appointment time to another patient.   Multiple missed appointments may lead to dismissal from the clinic.

## 2024-08-29 ENCOUNTER — ANESTHESIA EVENT (OUTPATIENT)
Dept: SURGERY | Facility: CLINIC | Age: 49
End: 2024-08-29
Payer: COMMERCIAL

## 2024-08-30 ENCOUNTER — HOSPITAL ENCOUNTER (OUTPATIENT)
Facility: CLINIC | Age: 49
Discharge: HOME | End: 2024-08-30
Attending: SPECIALIST | Admitting: SPECIALIST
Payer: COMMERCIAL

## 2024-08-30 ENCOUNTER — ANESTHESIA (OUTPATIENT)
Dept: SURGERY | Facility: CLINIC | Age: 49
End: 2024-08-30
Payer: COMMERCIAL

## 2024-08-30 VITALS
RESPIRATION RATE: 16 BRPM | TEMPERATURE: 98.2 F | WEIGHT: 163 LBS | HEART RATE: 77 BPM | BODY MASS INDEX: 27.83 KG/M2 | HEIGHT: 64 IN | OXYGEN SATURATION: 92 % | DIASTOLIC BLOOD PRESSURE: 78 MMHG | SYSTOLIC BLOOD PRESSURE: 128 MMHG

## 2024-08-30 PROCEDURE — 250N000025 HC SEVOFLURANE, PER MIN: Performed by: SPECIALIST

## 2024-08-30 PROCEDURE — C9290 INJ, BUPIVACAINE LIPOSOME: HCPCS | Performed by: NURSE ANESTHETIST, CERTIFIED REGISTERED

## 2024-08-30 PROCEDURE — 370N000017 HC ANESTHESIA TECHNICAL FEE, PER MIN: Performed by: SPECIALIST

## 2024-08-30 PROCEDURE — 250N000009 HC RX 250: Performed by: SPECIALIST

## 2024-08-30 PROCEDURE — 250N000013 HC RX MED GY IP 250 OP 250 PS 637: Performed by: SPECIALIST

## 2024-08-30 PROCEDURE — 250N000011 HC RX IP 250 OP 636: Performed by: NURSE ANESTHETIST, CERTIFIED REGISTERED

## 2024-08-30 PROCEDURE — 999N000141 HC STATISTIC PRE-PROCEDURE NURSING ASSESSMENT: Performed by: SPECIALIST

## 2024-08-30 PROCEDURE — 272N000001 HC OR GENERAL SUPPLY STERILE: Performed by: SPECIALIST

## 2024-08-30 PROCEDURE — 710N000012 HC RECOVERY PHASE 2, PER MINUTE: Performed by: SPECIALIST

## 2024-08-30 PROCEDURE — 710N000010 HC RECOVERY PHASE 1, LEVEL 2, PER MIN: Performed by: SPECIALIST

## 2024-08-30 PROCEDURE — 360N000080 HC SURGERY LEVEL 7, PER MIN: Performed by: SPECIALIST

## 2024-08-30 PROCEDURE — 250N000009 HC RX 250: Performed by: NURSE ANESTHETIST, CERTIFIED REGISTERED

## 2024-08-30 PROCEDURE — 49594 RPR AA HRN 1ST 3-10 NCR/STRN: CPT | Performed by: SPECIALIST

## 2024-08-30 PROCEDURE — C1781 MESH (IMPLANTABLE): HCPCS | Performed by: SPECIALIST

## 2024-08-30 PROCEDURE — 258N000003 HC RX IP 258 OP 636: Performed by: NURSE ANESTHETIST, CERTIFIED REGISTERED

## 2024-08-30 PROCEDURE — 250N000011 HC RX IP 250 OP 636: Performed by: SPECIALIST

## 2024-08-30 DEVICE — MESH SYMBOTEX COMPOSITE STEX ROUND 9CM SYM9: Type: IMPLANTABLE DEVICE | Site: ABDOMEN | Status: FUNCTIONAL

## 2024-08-30 RX ORDER — OXYCODONE AND ACETAMINOPHEN 5; 325 MG/1; MG/1
2 TABLET ORAL
Status: COMPLETED | OUTPATIENT
Start: 2024-08-30 | End: 2024-08-30

## 2024-08-30 RX ORDER — SODIUM CHLORIDE, SODIUM LACTATE, POTASSIUM CHLORIDE, CALCIUM CHLORIDE 600; 310; 30; 20 MG/100ML; MG/100ML; MG/100ML; MG/100ML
INJECTION, SOLUTION INTRAVENOUS CONTINUOUS
Status: DISCONTINUED | OUTPATIENT
Start: 2024-08-30 | End: 2024-08-30 | Stop reason: HOSPADM

## 2024-08-30 RX ORDER — HYDROXYZINE HYDROCHLORIDE 50 MG/ML
50 INJECTION, SOLUTION INTRAMUSCULAR EVERY 6 HOURS PRN
Status: DISCONTINUED | OUTPATIENT
Start: 2024-08-30 | End: 2024-08-30 | Stop reason: HOSPADM

## 2024-08-30 RX ORDER — FENTANYL CITRATE 50 UG/ML
25 INJECTION, SOLUTION INTRAMUSCULAR; INTRAVENOUS
Status: DISCONTINUED | OUTPATIENT
Start: 2024-08-30 | End: 2024-08-30 | Stop reason: HOSPADM

## 2024-08-30 RX ORDER — CEFAZOLIN SODIUM/WATER 2 G/20 ML
2 SYRINGE (ML) INTRAVENOUS
Status: COMPLETED | OUTPATIENT
Start: 2024-08-30 | End: 2024-08-30

## 2024-08-30 RX ORDER — DIMENHYDRINATE 50 MG/ML
25 INJECTION, SOLUTION INTRAMUSCULAR; INTRAVENOUS
Status: DISCONTINUED | OUTPATIENT
Start: 2024-08-30 | End: 2024-08-30 | Stop reason: HOSPADM

## 2024-08-30 RX ORDER — DEXAMETHASONE SODIUM PHOSPHATE 10 MG/ML
4 INJECTION, SOLUTION INTRAMUSCULAR; INTRAVENOUS
Status: DISCONTINUED | OUTPATIENT
Start: 2024-08-30 | End: 2024-08-30 | Stop reason: HOSPADM

## 2024-08-30 RX ORDER — ACETAMINOPHEN 325 MG/1
975 TABLET ORAL ONCE
Status: DISCONTINUED | OUTPATIENT
Start: 2024-08-30 | End: 2024-08-30 | Stop reason: HOSPADM

## 2024-08-30 RX ORDER — DEXAMETHASONE SODIUM PHOSPHATE 4 MG/ML
INJECTION, SOLUTION INTRA-ARTICULAR; INTRALESIONAL; INTRAMUSCULAR; INTRAVENOUS; SOFT TISSUE PRN
Status: DISCONTINUED | OUTPATIENT
Start: 2024-08-30 | End: 2024-08-30

## 2024-08-30 RX ORDER — BUPIVACAINE HYDROCHLORIDE AND EPINEPHRINE 2.5; 5 MG/ML; UG/ML
INJECTION, SOLUTION EPIDURAL; INFILTRATION; INTRACAUDAL; PERINEURAL PRN
Status: DISCONTINUED | OUTPATIENT
Start: 2024-08-30 | End: 2024-08-30 | Stop reason: HOSPADM

## 2024-08-30 RX ORDER — ONDANSETRON 4 MG/1
4 TABLET, ORALLY DISINTEGRATING ORAL EVERY 30 MIN PRN
Status: DISCONTINUED | OUTPATIENT
Start: 2024-08-30 | End: 2024-08-30 | Stop reason: HOSPADM

## 2024-08-30 RX ORDER — PROPOFOL 10 MG/ML
INJECTION, EMULSION INTRAVENOUS CONTINUOUS PRN
Status: DISCONTINUED | OUTPATIENT
Start: 2024-08-30 | End: 2024-08-30

## 2024-08-30 RX ORDER — ONDANSETRON 4 MG/1
4 TABLET, ORALLY DISINTEGRATING ORAL EVERY 30 MIN PRN
Status: COMPLETED | OUTPATIENT
Start: 2024-08-30 | End: 2024-08-30

## 2024-08-30 RX ORDER — ALBUTEROL SULFATE 0.83 MG/ML
2.5 SOLUTION RESPIRATORY (INHALATION) EVERY 4 HOURS PRN
Status: DISCONTINUED | OUTPATIENT
Start: 2024-08-30 | End: 2024-08-30 | Stop reason: HOSPADM

## 2024-08-30 RX ORDER — LIDOCAINE 40 MG/G
CREAM TOPICAL
Status: DISCONTINUED | OUTPATIENT
Start: 2024-08-30 | End: 2024-08-30 | Stop reason: HOSPADM

## 2024-08-30 RX ORDER — KETAMINE HYDROCHLORIDE 10 MG/ML
INJECTION INTRAMUSCULAR; INTRAVENOUS PRN
Status: DISCONTINUED | OUTPATIENT
Start: 2024-08-30 | End: 2024-08-30

## 2024-08-30 RX ORDER — CEFAZOLIN SODIUM/WATER 2 G/20 ML
2 SYRINGE (ML) INTRAVENOUS SEE ADMIN INSTRUCTIONS
Status: DISCONTINUED | OUTPATIENT
Start: 2024-08-30 | End: 2024-08-30 | Stop reason: HOSPADM

## 2024-08-30 RX ORDER — OXYCODONE HYDROCHLORIDE 5 MG/1
10 TABLET ORAL
Status: DISCONTINUED | OUTPATIENT
Start: 2024-08-30 | End: 2024-08-30 | Stop reason: HOSPADM

## 2024-08-30 RX ORDER — LIDOCAINE HYDROCHLORIDE 40 MG/ML
INJECTION, SOLUTION RETROBULBAR PRN
Status: DISCONTINUED | OUTPATIENT
Start: 2024-08-30 | End: 2024-08-30

## 2024-08-30 RX ORDER — MEPERIDINE HYDROCHLORIDE 25 MG/ML
12.5 INJECTION INTRAMUSCULAR; INTRAVENOUS; SUBCUTANEOUS EVERY 5 MIN PRN
Status: DISCONTINUED | OUTPATIENT
Start: 2024-08-30 | End: 2024-08-30 | Stop reason: HOSPADM

## 2024-08-30 RX ORDER — HYDROMORPHONE HCL IN WATER/PF 6 MG/30 ML
0.4 PATIENT CONTROLLED ANALGESIA SYRINGE INTRAVENOUS EVERY 5 MIN PRN
Status: DISCONTINUED | OUTPATIENT
Start: 2024-08-30 | End: 2024-08-30 | Stop reason: HOSPADM

## 2024-08-30 RX ORDER — DEXAMETHASONE SODIUM PHOSPHATE 10 MG/ML
INJECTION, SOLUTION INTRAMUSCULAR; INTRAVENOUS PRN
Status: DISCONTINUED | OUTPATIENT
Start: 2024-08-30 | End: 2024-08-30

## 2024-08-30 RX ORDER — NALOXONE HYDROCHLORIDE 0.4 MG/ML
0.1 INJECTION, SOLUTION INTRAMUSCULAR; INTRAVENOUS; SUBCUTANEOUS
Status: DISCONTINUED | OUTPATIENT
Start: 2024-08-30 | End: 2024-08-30 | Stop reason: HOSPADM

## 2024-08-30 RX ORDER — BUPIVACAINE HYDROCHLORIDE AND EPINEPHRINE 2.5; 5 MG/ML; UG/ML
INJECTION, SOLUTION INFILTRATION; PERINEURAL PRN
Status: DISCONTINUED | OUTPATIENT
Start: 2024-08-30 | End: 2024-08-30

## 2024-08-30 RX ORDER — PROPOFOL 10 MG/ML
INJECTION, EMULSION INTRAVENOUS PRN
Status: DISCONTINUED | OUTPATIENT
Start: 2024-08-30 | End: 2024-08-30

## 2024-08-30 RX ORDER — ONDANSETRON 2 MG/ML
4 INJECTION INTRAMUSCULAR; INTRAVENOUS EVERY 6 HOURS PRN
Status: DISCONTINUED | OUTPATIENT
Start: 2024-08-30 | End: 2024-08-30 | Stop reason: HOSPADM

## 2024-08-30 RX ORDER — OXYCODONE HYDROCHLORIDE 5 MG/1
5 TABLET ORAL
Status: DISCONTINUED | OUTPATIENT
Start: 2024-08-30 | End: 2024-08-30 | Stop reason: HOSPADM

## 2024-08-30 RX ORDER — LIDOCAINE HYDROCHLORIDE 20 MG/ML
INJECTION, SOLUTION INFILTRATION; PERINEURAL PRN
Status: DISCONTINUED | OUTPATIENT
Start: 2024-08-30 | End: 2024-08-30

## 2024-08-30 RX ORDER — HYDROMORPHONE HCL IN WATER/PF 6 MG/30 ML
0.2 PATIENT CONTROLLED ANALGESIA SYRINGE INTRAVENOUS EVERY 5 MIN PRN
Status: DISCONTINUED | OUTPATIENT
Start: 2024-08-30 | End: 2024-08-30 | Stop reason: HOSPADM

## 2024-08-30 RX ORDER — KETOROLAC TROMETHAMINE 30 MG/ML
INJECTION, SOLUTION INTRAMUSCULAR; INTRAVENOUS PRN
Status: DISCONTINUED | OUTPATIENT
Start: 2024-08-30 | End: 2024-08-30

## 2024-08-30 RX ORDER — ONDANSETRON 2 MG/ML
4 INJECTION INTRAMUSCULAR; INTRAVENOUS EVERY 30 MIN PRN
Status: DISCONTINUED | OUTPATIENT
Start: 2024-08-30 | End: 2024-08-30 | Stop reason: HOSPADM

## 2024-08-30 RX ORDER — FENTANYL CITRATE 50 UG/ML
25 INJECTION, SOLUTION INTRAMUSCULAR; INTRAVENOUS EVERY 5 MIN PRN
Status: DISCONTINUED | OUTPATIENT
Start: 2024-08-30 | End: 2024-08-30 | Stop reason: HOSPADM

## 2024-08-30 RX ORDER — FENTANYL CITRATE 50 UG/ML
INJECTION, SOLUTION INTRAMUSCULAR; INTRAVENOUS PRN
Status: DISCONTINUED | OUTPATIENT
Start: 2024-08-30 | End: 2024-08-30

## 2024-08-30 RX ORDER — MEPERIDINE HYDROCHLORIDE 25 MG/ML
25-50 INJECTION INTRAMUSCULAR; INTRAVENOUS; SUBCUTANEOUS
Status: COMPLETED | OUTPATIENT
Start: 2024-08-30 | End: 2024-08-30

## 2024-08-30 RX ORDER — ONDANSETRON 2 MG/ML
4 INJECTION INTRAMUSCULAR; INTRAVENOUS EVERY 30 MIN PRN
Status: COMPLETED | OUTPATIENT
Start: 2024-08-30 | End: 2024-08-30

## 2024-08-30 RX ORDER — FENTANYL CITRATE 50 UG/ML
50 INJECTION, SOLUTION INTRAMUSCULAR; INTRAVENOUS EVERY 5 MIN PRN
Status: DISCONTINUED | OUTPATIENT
Start: 2024-08-30 | End: 2024-08-30 | Stop reason: HOSPADM

## 2024-08-30 RX ADMIN — DEXAMETHASONE SODIUM PHOSPHATE 10 MG: 4 INJECTION, SOLUTION INTRA-ARTICULAR; INTRALESIONAL; INTRAMUSCULAR; INTRAVENOUS; SOFT TISSUE at 10:15

## 2024-08-30 RX ADMIN — ROCURONIUM BROMIDE 10 MG: 50 INJECTION, SOLUTION INTRAVENOUS at 10:44

## 2024-08-30 RX ADMIN — MIDAZOLAM 1 MG: 1 INJECTION INTRAMUSCULAR; INTRAVENOUS at 12:45

## 2024-08-30 RX ADMIN — LIDOCAINE HYDROCHLORIDE 4 ML: 40 INJECTION, SOLUTION RETROBULBAR; TOPICAL at 09:57

## 2024-08-30 RX ADMIN — DEXMEDETOMIDINE HYDROCHLORIDE 10 MCG: 100 INJECTION, SOLUTION INTRAVENOUS at 11:06

## 2024-08-30 RX ADMIN — LIDOCAINE HYDROCHLORIDE 50 MG: 20 INJECTION, SOLUTION INFILTRATION; PERINEURAL at 09:53

## 2024-08-30 RX ADMIN — DEXMEDETOMIDINE HYDROCHLORIDE 20 MCG: 100 INJECTION, SOLUTION INTRAVENOUS at 09:40

## 2024-08-30 RX ADMIN — FENTANYL CITRATE 50 MCG: 50 INJECTION, SOLUTION INTRAMUSCULAR; INTRAVENOUS at 12:02

## 2024-08-30 RX ADMIN — FENTANYL CITRATE 50 MCG: 50 INJECTION, SOLUTION INTRAMUSCULAR; INTRAVENOUS at 12:08

## 2024-08-30 RX ADMIN — BUPIVACAINE 5 ML: 13.3 INJECTION, SUSPENSION, LIPOSOMAL INFILTRATION at 11:41

## 2024-08-30 RX ADMIN — BUPIVACAINE HYDROCHLORIDE AND EPINEPHRINE BITARTRATE 7.5 ML: 2.5; .005 INJECTION, SOLUTION INFILTRATION; PERINEURAL at 11:41

## 2024-08-30 RX ADMIN — DEXAMETHASONE SODIUM PHOSPHATE 5 MG: 10 INJECTION, SOLUTION INTRAMUSCULAR; INTRAVENOUS at 11:41

## 2024-08-30 RX ADMIN — ROCURONIUM BROMIDE 20 MG: 50 INJECTION, SOLUTION INTRAVENOUS at 10:16

## 2024-08-30 RX ADMIN — HYDROXYZINE HYDROCHLORIDE 50 MG: 50 INJECTION, SOLUTION INTRAMUSCULAR at 12:37

## 2024-08-30 RX ADMIN — FENTANYL CITRATE 50 MCG: 50 INJECTION, SOLUTION INTRAMUSCULAR; INTRAVENOUS at 12:13

## 2024-08-30 RX ADMIN — BUPIVACAINE HYDROCHLORIDE AND EPINEPHRINE BITARTRATE 7.5 ML: 2.5; .005 INJECTION, SOLUTION INFILTRATION; PERINEURAL at 11:34

## 2024-08-30 RX ADMIN — SUGAMMADEX 200 MG: 100 INJECTION, SOLUTION INTRAVENOUS at 11:42

## 2024-08-30 RX ADMIN — Medication 20 MG: at 10:15

## 2024-08-30 RX ADMIN — FENTANYL CITRATE 50 MCG: 50 INJECTION INTRAMUSCULAR; INTRAVENOUS at 09:47

## 2024-08-30 RX ADMIN — BUPIVACAINE 5 ML: 13.3 INJECTION, SUSPENSION, LIPOSOMAL INFILTRATION at 11:40

## 2024-08-30 RX ADMIN — BUPIVACAINE HYDROCHLORIDE AND EPINEPHRINE BITARTRATE 7.5 ML: 2.5; .005 INJECTION, SOLUTION INFILTRATION; PERINEURAL at 11:40

## 2024-08-30 RX ADMIN — KETOROLAC TROMETHAMINE 30 MG: 30 INJECTION, SOLUTION INTRAMUSCULAR at 11:55

## 2024-08-30 RX ADMIN — BUPIVACAINE HYDROCHLORIDE AND EPINEPHRINE BITARTRATE 7.5 ML: 2.5; .005 INJECTION, SOLUTION INFILTRATION; PERINEURAL at 11:35

## 2024-08-30 RX ADMIN — DEXAMETHASONE SODIUM PHOSPHATE 5 MG: 10 INJECTION, SOLUTION INTRAMUSCULAR; INTRAVENOUS at 11:34

## 2024-08-30 RX ADMIN — PROPOFOL 100 MCG/KG/MIN: 10 INJECTION, EMULSION INTRAVENOUS at 09:53

## 2024-08-30 RX ADMIN — FENTANYL CITRATE 50 MCG: 50 INJECTION INTRAMUSCULAR; INTRAVENOUS at 09:40

## 2024-08-30 RX ADMIN — DEXAMETHASONE SODIUM PHOSPHATE 5 MG: 10 INJECTION, SOLUTION INTRAMUSCULAR; INTRAVENOUS at 11:35

## 2024-08-30 RX ADMIN — SODIUM CHLORIDE, POTASSIUM CHLORIDE, SODIUM LACTATE AND CALCIUM CHLORIDE 10 ML/HR: 600; 310; 30; 20 INJECTION, SOLUTION INTRAVENOUS at 08:45

## 2024-08-30 RX ADMIN — ROCURONIUM BROMIDE 10 MG: 50 INJECTION, SOLUTION INTRAVENOUS at 11:19

## 2024-08-30 RX ADMIN — PROPOFOL 200 MG: 10 INJECTION, EMULSION INTRAVENOUS at 09:53

## 2024-08-30 RX ADMIN — OXYCODONE HYDROCHLORIDE AND ACETAMINOPHEN 2 TABLET: 5; 325 TABLET ORAL at 12:58

## 2024-08-30 RX ADMIN — HYDROMORPHONE HYDROCHLORIDE 0.5 MG: 1 INJECTION, SOLUTION INTRAMUSCULAR; INTRAVENOUS; SUBCUTANEOUS at 10:35

## 2024-08-30 RX ADMIN — BUPIVACAINE 5 ML: 13.3 INJECTION, SUSPENSION, LIPOSOMAL INFILTRATION at 11:34

## 2024-08-30 RX ADMIN — ONDANSETRON 4 MG: 2 INJECTION INTRAMUSCULAR; INTRAVENOUS at 13:06

## 2024-08-30 RX ADMIN — DEXAMETHASONE SODIUM PHOSPHATE 5 MG: 10 INJECTION, SOLUTION INTRAMUSCULAR; INTRAVENOUS at 11:40

## 2024-08-30 RX ADMIN — Medication 2 G: at 09:40

## 2024-08-30 RX ADMIN — HYDROMORPHONE HYDROCHLORIDE 0.4 MG: 0.2 INJECTION, SOLUTION INTRAMUSCULAR; INTRAVENOUS; SUBCUTANEOUS at 12:19

## 2024-08-30 RX ADMIN — ONDANSETRON 4 MG: 2 INJECTION INTRAMUSCULAR; INTRAVENOUS at 11:45

## 2024-08-30 RX ADMIN — BUPIVACAINE 5 ML: 13.3 INJECTION, SUSPENSION, LIPOSOMAL INFILTRATION at 11:35

## 2024-08-30 RX ADMIN — ROCURONIUM BROMIDE 50 MG: 50 INJECTION, SOLUTION INTRAVENOUS at 09:53

## 2024-08-30 RX ADMIN — FENTANYL CITRATE 50 MCG: 50 INJECTION, SOLUTION INTRAMUSCULAR; INTRAVENOUS at 11:57

## 2024-08-30 RX ADMIN — MEPERIDINE HYDROCHLORIDE 50 MG: 25 INJECTION INTRAMUSCULAR; INTRAVENOUS; SUBCUTANEOUS at 12:37

## 2024-08-30 RX ADMIN — HYDROMORPHONE HYDROCHLORIDE 0.4 MG: 0.2 INJECTION, SOLUTION INTRAMUSCULAR; INTRAVENOUS; SUBCUTANEOUS at 12:31

## 2024-08-30 RX ADMIN — HYDROMORPHONE HYDROCHLORIDE 0.4 MG: 0.2 INJECTION, SOLUTION INTRAMUSCULAR; INTRAVENOUS; SUBCUTANEOUS at 12:26

## 2024-08-30 RX ADMIN — HYDROMORPHONE HYDROCHLORIDE 0.5 MG: 1 INJECTION, SOLUTION INTRAMUSCULAR; INTRAVENOUS; SUBCUTANEOUS at 10:26

## 2024-08-30 RX ADMIN — MIDAZOLAM 2 MG: 1 INJECTION INTRAMUSCULAR; INTRAVENOUS at 09:40

## 2024-08-30 ASSESSMENT — ACTIVITIES OF DAILY LIVING (ADL)
ADLS_ACUITY_SCORE: 23
ADLS_ACUITY_SCORE: 22
ADLS_ACUITY_SCORE: 23
ADLS_ACUITY_SCORE: 22
ADLS_ACUITY_SCORE: 20

## 2024-08-30 NOTE — ANESTHESIA POSTPROCEDURE EVALUATION
Patient: Radha Beckwith    Procedure: Procedure(s):  HERNIORRHAPHY, INCISIONAL, ROBOT-ASSISTED, LAPAROSCOPIC, USING DA JERRELL XI       Anesthesia Type:  General, Peripheral Nerve Block    Note:  Disposition: Outpatient   Postop Pain Control: Uneventful            Sign Out: Well controlled pain   PONV: No   Neuro/Psych: Uneventful            Sign Out: Acceptable/Baseline neuro status   Airway/Respiratory: Uneventful            Sign Out: Acceptable/Baseline resp. status   CV/Hemodynamics: Uneventful            Sign Out: Acceptable CV status   Other NRE: NONE   DID A NON-ROUTINE EVENT OCCUR? No    Event details/Postop Comments:  Pt was happy with anesthesia care.  No complications.  I will follow up with the pt if needed.           Last vitals:  Vitals Value Taken Time   /72 08/30/24 1315   Temp 98.4  F (36.9  C) 08/30/24 1300   Pulse 75 08/30/24 1315   Resp 14 08/30/24 1315   SpO2 95 % 08/30/24 1315       Electronically Signed By: JEANNIE Mckeon CRNA  August 30, 2024  3:23 PM

## 2024-08-30 NOTE — ANESTHESIA PROCEDURE NOTES
TAP Procedure Note    Pre-Procedure   Staff -        CRNA: Gutierrez Merida APRN CRNA       Other Anesthesia Staff: Moisés Bailey       Performed By: CRNA and CONNOR       Location: OR       Pre-Anesthestic Checklist: patient identified, IV checked, site marked, risks and benefits discussed, informed consent, monitors and equipment checked, pre-op evaluation, at physician/surgeon's request and post-op pain management  Timeout:       Correct Patient: Yes        Correct Procedure: Yes        Correct Site: Yes        Correct Position: Yes        Correct Laterality: Yes        Site Marked: Yes  Procedure Documentation  Procedure: TAP       Diagnosis: S/P VENTRAL HERNIA HERNIA REPAIR-NEED FOR ASSIST WITH PAIN MANAGEMENT ON CHRONIC PAIN PT ON SUBOXONE       Laterality: bilateral       Patient Position: supine       Patient Prep/Sterile Barriers: sterile gloves, mask       Skin prep: Chloraprep       Needle Type: insulated       Needle Gauge: 22.        Needle Length (millimeters): 100        Ultrasound guided       1. Ultrasound was used to identify targeted nerve, plexus, vascular marker, or fascial plane and place a needle adjacent to it in real-time.       2. Ultrasound was used to visualize the spread of anesthetic in close proximity to the above referenced structure.       3. A permanent image is entered into the patient's record.       4. The visualized anatomic structures appeared normal.       5. There were no apparent abnormal pathologic findings.    Assessment/Narrative         The placement was negative for: blood aspirated, painful injection and site bleeding       Paresthesias: No.       Test dose of mL at.         Test dose negative, 3 minutes after injection, for signs of intravascular, subdural, or intrathecal injection.       Bolus given via needle..        Secured via.        Insertion/Infusion Method: Single Shot       Complications: none       Injection made incrementally with aspirations every 5  "mL.     Comments:  Blocks were performed by CONNOR GREGG under the direct supervision of JAN Merida CRNA.  No problems/complications were observed.    Pt tolerated the procedure well as under General Anesthesia.  There was good visualization of the space between the internal oblique and the transverses abdominis.  There was also good visualization of fluid dissection in this layer.  No complications were noted.  I will follow up with this pt if needed.      FOR [facility] ([facility]) ONLY:   Pain Team Contact information: please page the Pain Team Via [employer]. Search \"Pain\". During daytime hours, please page the attending first. At night please page the resident first.      "

## 2024-08-30 NOTE — OP NOTE
[facility] Operative Note    Pre-operative diagnosis: Incarcerated incisional hernia [K43.0]  Port-site hernia [K91.89, K43.2]   Post-operative diagnosis: Same, 3x1 cm defect   Procedure: Procedure(s):  HERNIORRHAPHY, INCISIONAL, ROBOT-ASSISTED, LAPAROSCOPIC, USING DA JERRELL XI with Symbotex 9 cm Mentasta mesh.  3 x 1 cm defect, incarcerated   Surgeon: Michael Dozier MD, FACS   Assistant(s): None   Anesthesia: General endotracheal anesthesia   Estimated blood loss: Less than 10 ml   Total IV fluids: (See anesthesia record)   Blood transfusion: No transfusion was given during surgery   Total urine output: (See anesthesia record)   Drains: None   Specimens: None   Implants: Symbotex 9 cm Mentasta mesh   Findings: 3 x 1 cm defect containing incarcerated preperitoneal fat   Complications: None   Condition: Stable   Comments: Indications for procedure: This a 48-year-old 80% with episode of epigastric pain and a lump.  CT scan revealed a ventral hernia at an old port site.  It was found to be incarcerated.  Because of the pain is elected the operating for repair.         Details of procedure:  With the cooperation of the patient in the preop holding area the hernia site was marked.  She was taken to the operating room and placed in the table in supine position.  After induction of general anesthesia the abdomen was then prepped and draped in a sterile fashion.  Timeout was performed confirm the identity the patient as well as the procedure performed.  A left upper quadrant incision made and a 5 mm Visiport was inserted to the abdomen under direct visualization.  The abs insufflated to 15 mmHg pressure.  Generalized specks in the abdomen carried out and there is no evidence of any injury from insertion of Visiport.  An incarcerated hernia defect could be seen in the epigastrium.  Robotic ports were then placed at the 6:00 4:00 and 3:00 positions on the abdomen.  The defect was noted to be on the smaller side so it is  felt the 9 cm Symbotex cervical mesh would be adequate.  A piece of 0 Vicryl was then placed in the center of the mesh as a transfascial tie.  The mesh was then inserted the abdomen along with a 0 STRATAFIX suture and 2 2-0 STRATAFIX sutures.  The robot was then docked and targeted.  A force bipolar was then placed in arm to and hudson in arm 4.  I then went to the console.  The falciform ligament was then taken down using the EndoShears combined with cautery.  I dissected superiorly to the defect superiorly and it was found to contain incarcerated preperitoneal fat which was reduced with a combination of the hudson and traction.  The defect measured 3 x 1 cm.  After reducing the defect we dissected further superiorly to allow adequate placement of the mesh centering over this area.  After this was completed the hudson were removed and a Jung suture cut needle  was inserted.  The defect was then closed primarily using the 0 STRATAFIX suture.  A separate stab incision was then made over the defect.  And the transfascial 0 Vicryl suture were then used to pull the mesh up to the abdominal wall using a PMI needle.  This was tied in place.  The mesh was then sewn to the surrounding fascia and abdominal wall using 2-0 STRATAFIX suture circumferentially.  The area was inspected and there was no evidence of any bleeding.  All needles were then removed.  The robot was undocked.  The trocars were then removed under direct visualization without incident bleeding from the trocar sites.  All skin incisions were then closed using 4-0 Monocryl suture and Dermabond glue.  Patient had a tap block placed by anesthesia.  She was then taken from the operative to recovery in stable condition to be sent home.      Michael Dozier MD, FACS

## 2024-08-30 NOTE — ANESTHESIA PROCEDURE NOTES
Airway       Patient location during procedure: OR       Procedure Start/Stop Times: 8/30/2024 9:57 AM  Staff -        CRNA: Gutierrez Merida APRN CRNA       Other Anesthesia Staff: Moisés Bailey       Performed By: with CRNAs and SRNA       Procedure performed by resident/fellow/CRNA in presence of a teaching physician.    Consent for Airway        Urgency: elective  Indications and Patient Condition       Indications for airway management: jayleen-procedural       Induction type:intravenous       Mask difficulty assessment: 2 - vent by mask + OA or adjuvant +/- NMBA    Final Airway Details       Final airway type: endotracheal airway       Successful airway: ETT - single and Oral  Endotracheal Airway Details        ETT size (mm): 6.5       Cuffed: yes       Successful intubation technique: video laryngoscopy       VL Blade Size: Glidescope 3       Grade View of Cords: 1       Adjucts: stylet       Position: Right       Measured from: gums/teeth       Secured at (cm): 22       Bite block used: None    Post intubation assessment        Placement verified by: capnometry, equal breath sounds and chest rise        Number of attempts at approach: 1       Number of other approaches attempted: 0       Secured with: tape       Ease of procedure: easy       Dentition: Intact and Unchanged    Medication(s) Administered   Medication Administration Time: 8/30/2024 9:57 AM

## 2024-08-30 NOTE — ANESTHESIA CARE TRANSFER NOTE
Patient: Radha Beckwith    Procedure: Procedure(s):  HERNIORRHAPHY, INCISIONAL, ROBOT-ASSISTED, LAPAROSCOPIC, USING DA JERRELL XI       Diagnosis: Incarcerated incisional hernia [K43.0]  Port-site hernia [K91.89, K43.2]  Diagnosis Additional Information: No value filed.    Anesthesia Type:   General, Peripheral Nerve Block     Note:    Oropharynx: oropharynx clear of all foreign objects and spontaneously breathing  Level of Consciousness: drowsy  Oxygen Supplementation: face mask    Independent Airway: airway patency satisfactory and stable  Dentition: dentition unchanged  Vital Signs Stable: post-procedure vital signs reviewed and stable  Report to RN Given: handoff report given  Patient transferred to: PACU    Handoff Report: Identifed the Patient, Identified the Reponsible Provider, Reviewed the pertinent medical history, Discussed the surgical course, Reviewed Intra-OP anesthesia mangement and issues during anesthesia, Set expectations for post-procedure period and Allowed opportunity for questions and acknowledgement of understanding      Vitals:  Vitals Value Taken Time   /72 08/30/24 1315   Temp 98.4  F (36.9  C) 08/30/24 1300   Pulse 75 08/30/24 1315   Resp 14 08/30/24 1315   SpO2 95 % 08/30/24 1315       Electronically Signed By: JEANNIE Mckeon CRNA  August 30, 2024  3:24 PM

## 2024-08-30 NOTE — ANESTHESIA PREPROCEDURE EVALUATION
Anesthesia Pre-Procedure Evaluation    Patient: Radha Beckwith   MRN: [id number] : 1975        Procedure : Procedure(s):  HERNIORRHAPHY, INCISIONAL, ROBOT-ASSISTED, LAPAROSCOPIC, USING DA JERRELL XI          Past Medical History:   Diagnosis Date     Abdominal pain, right lower quadrant 2008    Admit. Discharged 03/10/08     Anxiety attack 2015     Atypical chest pain 2015     De Quervain's disease (tenosynovitis)      Dehydration      Depressive disorder      Gastric ulcer 2015     GERD (gastroesophageal reflux disease) 2010    Takes omeprazole and metoclopramide      Hypertension      Ingrowing nail 2014     Migraines      Opioid dependence in remission (H) 2015     Other and unspecified ovarian cyst      Papanicolaou smear of cervix with low grade squamous intraepithelial lesion (LGSIL) 2017      Past Surgical History:   Procedure Laterality Date     BIOPSY CERVICAL, LOCAL EXCISION, SINGLE/MULTIPLE N/A 8/10/2017    Procedure: BIOPSY CERVICAL, LOCAL EXCISION, SINGLE/MULTIPLE;;  Surgeon: Michael Chandler MD;  Location: [facility]     COLONOSCOPY N/A 2023    Procedure: COLONOSCOPY;  Surgeon: Michael Dozier MD;  Location: [facility]     COLPOSCOPY, BIOPSY, COMBINED N/A 8/10/2017    Procedure: COMBINED COLPOSCOPY, BIOPSY;  Colposcopy with Cervical Biopsies and Endometrial Biopsy, Exam with Ultrasound;  Surgeon: Michael Chandler MD;  Location: [facility]     ESOPHAGOSCOPY, GASTROSCOPY, DUODENOSCOPY (EGD), COMBINED N/A 2017    Procedure: COMBINED ESOPHAGOSCOPY, GASTROSCOPY, DUODENOSCOPY (EGD);  Surgeon: Ibrahima Esposito MD;  Location[facility]     ESOPHAGOSCOPY, GASTROSCOPY, DUODENOSCOPY (EGD), COMBINED N/A 2023    Procedure: ESOPHAGOGASTRODUODENOSCOPY, WITH BIOPSY;  Surgeon: Michael Dozier MD;  [facility]     ESOPHAGOSCOPY, GASTROSCOPY, DUODENOSCOPY (EGD), COMBINED N/A 10/3/2023    Procedure: ESOPHAGOGASTRODUODENOSCOPY, WITH  BIOPSY;  Surgeon: John Paul Varela MD;  Location[facility]     EXAM UNDER ANESTHESIA PELVIC N/A 8/10/2017    Procedure: EXAM UNDER ANESTHESIA PELVIC;;  Surgeon: Michael Chandler MD;  [facility]     HYSTERECTOMY       HYSTERECTOMY, PAP NO LONGER INDICATED       INJECT EPIDURAL CERVICAL N/A 10/20/2023    Procedure: Cervical 6-7 Interlaminar epidural steroid injection using fluoroscopic guidance with contrast dye.;  Surgeon: Trevor Ivory MD;  Location: [facility]     INJECT EPIDURAL CERVICAL N/A 4/19/2024    Procedure: Cervical 6 - Cervical 7 Interlaminar epidural steroid injection using fluoroscopic guidance with contrast dye.;  Surgeon: Trevor Ivory MD;  Location[facility]     INJECT EPIDURAL LUMBAR Bilateral 7/1/2022    Procedure: Lumbar 5-Sacral 1 Transforaminal Epidural Steroid Injection with fluoroscopic guidance and contrast, bilateral;  Surgeon: Trevor Ivory MD;  Location[facility]     LAPAROSCOPIC CHOLECYSTECTOMY N/A 2/2/2018    Procedure: LAPAROSCOPIC CHOLECYSTECTOMY;  Laparoscopic Cholecystectomy;  Surgeon: Tigre Lowry DO;  [facility]     LAPAROSCOPIC HYSTERECTOMY TOTAL N/A 10/30/2017    Procedure: LAPAROSCOPIC HYSTERECTOMY TOTAL;  LAPAROSCOPIC HYSTERECTOMY TOTAL POSSIBLE SALPINGO-OOPHERECTOMY (BILATERAL);  Surgeon: Michael Chandler MD;  Location:  OR     [facility]GI ENDOSCOPY, SIMPLE EXAM  01/07/08      Allergies   Allergen Reactions     Ergotamine-Caffeine      12-            GI problems-     Seasonal Allergies      Sumatriptan      vomits after giving herself a shot     Compazine Anxiety and Palpitations     Severe anxiety attack     Droperidol Anxiety and Palpitations     Severe anxiety attack     Nubain [Nalbuphine Hcl] Anxiety and Palpitations     Severe anxiety attack     Prochlorperazine Anxiety and Palpitations     Uncontrolled movement, severe anxiety attack      Social History     Tobacco Use     Smoking status: Every Day     Current packs/day: 0.25      Average packs/day: 0.3 packs/day for 20.0 years (5.0 ttl pk-yrs)     Types: Cigarettes     Passive exposure: Never     Smokeless tobacco: Never   Substance Use Topics     Alcohol use: Yes     Comment: Occasionaly      Wt Readings from Last 1 Encounters:   08/21/24 73.9 kg (163 lb)        Anesthesia Evaluation   Pt has had prior anesthetic.     No history of anesthetic complications       ROS/MED HX  ENT/Pulmonary:  - neg pulmonary ROS     Neurologic:  - neg neurologic ROS     Cardiovascular:  - neg cardiovascular ROS   (+)  - -   -  - -                                 Previous cardiac testing   Echo: Date: Results:    Stress Test:  Date: Results:    ECG Reviewed:  Date: 5/13/2024 Results:  Sinus Tachycardia   WITHIN NORMAL LIMITS    Cath:  Date: Results:   (-) hypertension   METS/Exercise Tolerance: >4 METS    Hematologic:  - neg hematologic  ROS     Musculoskeletal:  - neg musculoskeletal ROS     GI/Hepatic:  - neg GI/hepatic ROS   (+) GERD,                   Renal/Genitourinary:  - neg Renal ROS     Endo:  - neg endo ROS     Psychiatric/Substance Use:  - neg psychiatric ROS   (+)    H/O chronic opiod use .     Infectious Disease:  - neg infectious disease ROS     Malignancy:  - neg malignancy ROS     Other:  - neg other ROS          Physical Exam    Airway        Mallampati: II   TM distance: > 3 FB   Neck ROM: full   Mouth opening: > 3 cm    Respiratory Devices and Support         Dental       (+) Modest Abnormalities - crowns, retainers, 1 or 2 missing teeth and Removable bridges or other hardware    B=Bridge, C=Chipped, L=Loose, M=Missing    Cardiovascular   cardiovascular exam normal          Pulmonary   pulmonary exam normal              OUTSIDE LABS:  CBC:   Lab Results   Component Value Date    WBC 8.0 05/13/2024    WBC 8.0 09/13/2023    HGB 13.5 05/13/2024    HGB 12.8 09/13/2023    HCT 41.3 05/13/2024    HCT 38.4 09/13/2023     05/13/2024     09/13/2023     BMP:   Lab Results   Component  Value Date     05/13/2024     01/05/2024    POTASSIUM 4.0 05/13/2024    POTASSIUM 3.9 01/05/2024    CHLORIDE 102 05/13/2024    CHLORIDE 99 01/05/2024    CO2 27 05/13/2024    CO2 32 (H) 01/05/2024    BUN 13.1 05/13/2024    BUN 10.1 01/05/2024    CR 0.74 05/13/2024    CR 0.77 01/05/2024     (H) 05/13/2024     (H) 01/05/2024     COAGS:   Lab Results   Component Value Date    PTT 24 03/24/2017    INR 0.87 03/24/2017     POC:   Lab Results   Component Value Date    HCG Negative 05/22/2013    HCGS Negative 09/06/2023     HEPATIC:   Lab Results   Component Value Date    ALBUMIN 4.3 09/13/2023    PROTTOTAL 7.2 09/13/2023    ALT 25 09/13/2023    AST 24 09/13/2023    ALKPHOS 87 09/13/2023    BILITOTAL <0.2 09/13/2023     OTHER:   Lab Results   Component Value Date    A1C 5.0 11/24/2019    MOISES 9.2 05/13/2024    PHOS 2.7 08/17/2007    MAG 2.0 01/05/2024    LIPASE 20 09/13/2023    AMYLASE 37 01/24/2018    TSH 3.75 01/05/2024    CRP 6.3 03/02/2021    SED 17 09/13/2023       Anesthesia Plan    ASA Status:  2    NPO Status:  NPO Appropriate    Anesthesia Type: General.     - Airway: ETT   Induction: Intravenous, Propofol.   Maintenance: Inhalation.        Consents    Anesthesia Plan(s) and associated risks, benefits, and realistic alternatives discussed. Questions answered and patient/representative(s) expressed understanding.     - Discussed:     - Discussed with:  Patient      - Extended Intubation/Ventilatory Support Discussed: No.      - Patient is DNR/DNI Status: No     Use of blood products discussed: No .     Postoperative Care    Pain management: IV analgesics, Oral pain medications, Multi-modal analgesia, Peripheral nerve block (Single Shot).   PONV prophylaxis: Ondansetron (or other 5HT-3), Dexamethasone or Solumedrol, Background Propofol Infusion     Comments:    Other Comments: The risks and benefits of anesthesia, and the alternatives where applicable, have been discussed with the patient, and  "they wish to proceed.           JEANNIE Viramontes CRNA    I have reviewed the pertinent notes and labs in the chart from the past 30 days and (re)examined the patient.  Any updates or changes from those notes are reflected in this note.              # Overweight: Estimated body mass index is 28.17 kg/m  as calculated from the following:    Height as of 8/19/24: 1.62 m (5' 3.78\").    Weight as of 8/21/24: 73.9 kg (163 lb).      "

## 2024-08-31 ENCOUNTER — HOSPITAL ENCOUNTER (EMERGENCY)
Facility: CLINIC | Age: 49
Discharge: HOME | End: 2024-08-31
Attending: NURSE PRACTITIONER | Admitting: NURSE PRACTITIONER
Payer: COMMERCIAL

## 2024-08-31 VITALS
BODY MASS INDEX: 28.88 KG/M2 | OXYGEN SATURATION: 98 % | HEIGHT: 63 IN | HEART RATE: 72 BPM | WEIGHT: 163 LBS | TEMPERATURE: 99.1 F | SYSTOLIC BLOOD PRESSURE: 149 MMHG | RESPIRATION RATE: 19 BRPM | DIASTOLIC BLOOD PRESSURE: 86 MMHG

## 2024-08-31 DIAGNOSIS — G89.18 ACUTE POST-OPERATIVE PAIN: ICD-10-CM

## 2024-08-31 PROCEDURE — 96376 TX/PRO/DX INJ SAME DRUG ADON: CPT | Performed by: NURSE PRACTITIONER

## 2024-08-31 PROCEDURE — 96375 TX/PRO/DX INJ NEW DRUG ADDON: CPT | Performed by: NURSE PRACTITIONER

## 2024-08-31 PROCEDURE — 96374 THER/PROPH/DIAG INJ IV PUSH: CPT | Performed by: NURSE PRACTITIONER

## 2024-08-31 PROCEDURE — 250N000011 HC RX IP 250 OP 636: Performed by: NURSE PRACTITIONER

## 2024-08-31 PROCEDURE — 99284 EMERGENCY DEPT VISIT MOD MDM: CPT | Mod: 25 | Performed by: NURSE PRACTITIONER

## 2024-08-31 PROCEDURE — 99284 EMERGENCY DEPT VISIT MOD MDM: CPT | Performed by: NURSE PRACTITIONER

## 2024-08-31 RX ORDER — ONDANSETRON 2 MG/ML
4 INJECTION INTRAMUSCULAR; INTRAVENOUS ONCE
Status: COMPLETED | OUTPATIENT
Start: 2024-08-31 | End: 2024-08-31

## 2024-08-31 RX ORDER — OXYCODONE HYDROCHLORIDE 5 MG/1
5 TABLET ORAL EVERY 6 HOURS PRN
Qty: 12 TABLET | Refills: 0 | Status: SHIPPED | OUTPATIENT
Start: 2024-08-31 | End: 2024-09-09

## 2024-08-31 RX ORDER — KETOROLAC TROMETHAMINE 15 MG/ML
15 INJECTION, SOLUTION INTRAMUSCULAR; INTRAVENOUS ONCE
Status: COMPLETED | OUTPATIENT
Start: 2024-08-31 | End: 2024-08-31

## 2024-08-31 RX ORDER — HYDROMORPHONE HYDROCHLORIDE 1 MG/ML
0.5 INJECTION, SOLUTION INTRAMUSCULAR; INTRAVENOUS; SUBCUTANEOUS EVERY 30 MIN PRN
Status: COMPLETED | OUTPATIENT
Start: 2024-08-31 | End: 2024-08-31

## 2024-08-31 RX ADMIN — KETOROLAC TROMETHAMINE 15 MG: 15 INJECTION, SOLUTION INTRAMUSCULAR; INTRAVENOUS at 12:59

## 2024-08-31 RX ADMIN — HYDROMORPHONE HYDROCHLORIDE 0.5 MG: 1 INJECTION, SOLUTION INTRAMUSCULAR; INTRAVENOUS; SUBCUTANEOUS at 14:28

## 2024-08-31 RX ADMIN — HYDROMORPHONE HYDROCHLORIDE 0.5 MG: 1 INJECTION, SOLUTION INTRAMUSCULAR; INTRAVENOUS; SUBCUTANEOUS at 12:53

## 2024-08-31 RX ADMIN — ONDANSETRON 4 MG: 2 INJECTION INTRAMUSCULAR; INTRAVENOUS at 12:50

## 2024-08-31 RX ADMIN — HYDROMORPHONE HYDROCHLORIDE 0.5 MG: 1 INJECTION, SOLUTION INTRAMUSCULAR; INTRAVENOUS; SUBCUTANEOUS at 13:34

## 2024-08-31 ASSESSMENT — COLUMBIA-SUICIDE SEVERITY RATING SCALE - C-SSRS
1. IN THE PAST MONTH, HAVE YOU WISHED YOU WERE DEAD OR WISHED YOU COULD GO TO SLEEP AND NOT WAKE UP?: NO
6. HAVE YOU EVER DONE ANYTHING, STARTED TO DO ANYTHING, OR PREPARED TO DO ANYTHING TO END YOUR LIFE?: NO
2. HAVE YOU ACTUALLY HAD ANY THOUGHTS OF KILLING YOURSELF IN THE PAST MONTH?: NO

## 2024-08-31 ASSESSMENT — ACTIVITIES OF DAILY LIVING (ADL)
ADLS_ACUITY_SCORE: 36

## 2024-08-31 NOTE — DISCHARGE INSTRUCTIONS
Tylenol 650 mg every 4 hours as needed for mild/moderate pain.  Oxycodone 5 mg every 6 hours as needed for severe pain. Do not drive or drink alcohol with this medication.  ---I recommend holding your Suboxone today and tomorrow while using the oxycodone. You can start back on your Suboxone on Monday morning.    Return to the emergency department for any worsening symptoms.

## 2024-08-31 NOTE — ED TRIAGE NOTES
Had hernia repair done yesterday and states they were suppose to do a block and it didn't work.  States she was having significant pain before leaving and still is.  Pt is on suboxone since 2009.  Did go home with prescription for pain meds but the pharmacy won't fill due to needing a prior auth as suboxone was filled within the last 30 days

## 2024-08-31 NOTE — ED PROVIDER NOTES
History     Chief Complaint   Patient presents with    Post-op Problem     HPI  Radha Beckwith is a 48 year old female who presents for evaluation of post-op pain. Patient had ventral hernia repair surgery here yesterday with Dr. Dozier. Patient returns due to pain not being controlled. Patient is on Suboxone daily for chronic pain. She reports not tolerating the pain since she left here yesterday. She was given paper prescription for oxycodone but was unable to fill the prescription due to needing prior authorization since she just had her Suboxone filled on 8/27/2024.  Patient states that she was told if she could not get her pain controlled at home she would need to come back to be admitted.  Patient has not taken her Suboxone today.  She used her medical cannabis this today to see if that would help with her pain.       Allergies:  Allergies   Allergen Reactions    Ergotamine-Caffeine      12-            GI problems-    Seasonal Allergies     Sumatriptan      vomits after giving herself a shot    Compazine Anxiety and Palpitations     Severe anxiety attack    Droperidol Anxiety and Palpitations     Severe anxiety attack    Nubain [Nalbuphine Hcl] Anxiety and Palpitations     Severe anxiety attack    Prochlorperazine Anxiety and Palpitations     Uncontrolled movement, severe anxiety attack       Problem List:    Patient Active Problem List    Diagnosis Date Noted    Cervicalgia 04/02/2024     Priority: Medium    Cervical radiculopathy 02/14/2024     Priority: Medium    Chronic bilateral thoracic back pain 02/14/2024     Priority: Medium    Lumbar radiculopathy 02/14/2024     Priority: Medium    Pain of both sacroiliac joints 02/14/2024     Priority: Medium    Radicular pain of upper extremity 02/14/2024     Priority: Medium    Somatic dysfunction of pelvic region 01/10/2024     Priority: Medium    Myofascial pain 01/10/2024     Priority: Medium    Generalized anxiety disorder 11/27/2023      Priority: Medium    Chronic low back pain with bilateral sciatica 06/15/2023     Priority: Medium    Chronic neck pain 06/15/2023     Priority: Medium    Moderate episode of recurrent major depressive disorder (H) 06/29/2022     Priority: Medium    Supraventricular tachycardia (H24) 06/03/2019     Priority: Medium    Psychophysiological insomnia 12/30/2017     Priority: Medium    Chronic bilateral low back pain without sciatica 12/30/2017     Priority: Medium    Opioid dependence in remission (H) 08/02/2015     Priority: Medium     On suboxone treatement with Bryant Harkins.  (Noted in 2015).        Anxiety attack 07/31/2015     Priority: Medium    Hypokalemia 06/23/2015     Priority: Medium    Tobacco abuse 06/23/2015     Priority: Medium    Hyperlipidemia LDL goal <100 01/29/2014     Priority: Medium    GERD (gastroesophageal reflux disease) 07/28/2010     Priority: Medium     Takes omeprazole and metoclopramide      Restless legs 07/28/2010     Priority: Medium        Past Medical History:    Past Medical History:   Diagnosis Date    Abdominal pain, right lower quadrant 03/09/2008    Anxiety attack 07/31/2015    Atypical chest pain 06/23/2015    De Quervain's disease (tenosynovitis)     Dehydration     Depressive disorder 1996    Gastric ulcer 07/31/2015    GERD (gastroesophageal reflux disease) 07/28/2010    Hypertension 2017    Ingrowing nail 01/09/2014    Migraines     Opioid dependence in remission (H) 08/02/2015    Other and unspecified ovarian cyst     Papanicolaou smear of cervix with low grade squamous intraepithelial lesion (LGSIL) 07/07/2017       Past Surgical History:    Past Surgical History:   Procedure Laterality Date    BIOPSY CERVICAL, LOCAL EXCISION, SINGLE/MULTIPLE N/A 8/10/2017    Procedure: BIOPSY CERVICAL, LOCAL EXCISION, SINGLE/MULTIPLE;;  Surgeon: Michael Chandler MD;  Location: [facility]    COLONOSCOPY N/A 1/6/2023    Procedure: COLONOSCOPY;  Surgeon: Jacoby  MD Michael;  Location: [facility]    COLPOSCOPY, BIOPSY, COMBINED N/A 8/10/2017    Procedure: COMBINED COLPOSCOPY, BIOPSY;  Colposcopy with Cervical Biopsies and Endometrial Biopsy, Exam with Ultrasound;  Surgeon: Michael Chandler MD;  Location: [facility]    ESOPHAGOSCOPY, GASTROSCOPY, DUODENOSCOPY (EGD), COMBINED N/A 4/17/2017    Procedure: COMBINED ESOPHAGOSCOPY, GASTROSCOPY, DUODENOSCOPY (EGD);  Surgeon: Ibrahima Esposito MD;  [facility]    ESOPHAGOSCOPY, GASTROSCOPY, DUODENOSCOPY (EGD), COMBINED N/A 1/6/2023    Procedure: ESOPHAGOGASTRODUODENOSCOPY, WITH BIOPSY;  Surgeon: Michael Dozier MD;  [facility]    ESOPHAGOSCOPY, GASTROSCOPY, DUODENOSCOPY (EGD), COMBINED N/A 10/3/2023    Procedure: ESOPHAGOGASTRODUODENOSCOPY, WITH BIOPSY;  Surgeon: John Paul Varela MD;  [facility]    EXAM UNDER ANESTHESIA PELVIC N/A 8/10/2017    Procedure: EXAM UNDER ANESTHESIA PELVIC;;  Surgeon: Michael Chandler MD;  [facility]    HYSTERECTOMY      HYSTERECTOMY, PAP NO LONGER INDICATED      INJECT EPIDURAL CERVICAL N/A 10/20/2023    Procedure: Cervical 6-7 Interlaminar epidural steroid injection using fluoroscopic guidance with contrast dye.;  Surgeon: Trevor Ivory MD;  [facility]    INJECT EPIDURAL CERVICAL N/A 4/19/2024    Procedure: Cervical 6 - Cervical 7 Interlaminar epidural steroid injection using fluoroscopic guidance with contrast dye.;  Surgeon: Trevor Ivory MD;  [facility]    INJECT EPIDURAL LUMBAR Bilateral 7/1/2022    Procedure: Lumbar 5-Sacral 1 Transforaminal Epidural Steroid Injection with fluoroscopic guidance and contrast, bilateral;  Surgeon: Trevor Ivory MD;  [facility]    LAPAROSCOPIC CHOLECYSTECTOMY N/A 2/2/2018    Procedure: LAPAROSCOPIC CHOLECYSTECTOMY;  Laparoscopic Cholecystectomy;  Surgeon: Tigre Lowry DO;  [facility]    LAPAROSCOPIC HYSTERECTOMY TOTAL N/A 10/30/2017    Procedure: LAPAROSCOPIC HYSTERECTOMY TOTAL;  LAPAROSCOPIC  HYSTERECTOMY TOTAL POSSIBLE SALPINGO-OOPHERECTOMY (BILATERAL);  Surgeon: Michael Chandler MD;  Location:  OR    [facility] UGI ENDOSCOPY, SIMPLE EXAM  08       Family History:    Family History   Problem Relation Age of Onset    Depression Mother     Respiratory Mother     Chronic Obstructive Pulmonary Disease Mother     Hypertension Mother     Anxiety Disorder Mother     Cerebrovascular Disease Father         First stroke at age 28,  from 2nd when he was 55. Brain aneurysms.    Breast Cancer Cousin     Adrenal Disorder Other     Chronic Obstructive Pulmonary Disease Other     Asthma Brother        Social History:  Marital Status:  Single [1]  Social History     Tobacco Use    Smoking status: Every Day     Current packs/day: 0.25     Average packs/day: 0.3 packs/day for 20.0 years (5.0 ttl pk-yrs)     Types: Cigarettes     Passive exposure: Never    Smokeless tobacco: Never   Vaping Use    Vaping status: Former    Substances: Nicotine, CBD    Devices: Daily [employer]   Substance Use Topics    Alcohol use: Yes     Comment: Occasionaly    Drug use: No        Medications:    oxyCODONE (ROXICODONE) 5 MG tablet  acetaminophen (TYLENOL) 500 MG tablet  ALPRAZolam (XANAX) 0.5 MG tablet  baclofen (LIORESAL) 20 MG tablet  bisacodyl (DULCOLAX) 5 MG EC tablet  buprenorphine HCl-naloxone HCl (SUBOXONE) 2-0.5 MG per film  busPIRone HCl (BUSPAR) 30 MG tablet  CONSTULOSE 10 GM/15ML solution  cyclobenzaprine (FLEXERIL) 5 MG tablet  DULoxetine (CYMBALTA) 60 MG capsule  famotidine (PEPCID) 40 MG tablet  linaclotide (LINZESS) 72 MCG capsule  medical cannabis (Patient's own supply)  ondansetron (ZOFRAN ODT) 4 MG ODT tab  pantoprazole (PROTONIX) 40 MG EC tablet  propranolol (INDERAL) 10 MG tablet  rOPINIRole (REQUIP) 1 MG tablet  simvastatin (ZOCOR) 10 MG tablet  temazepam (RESTORIL) 15 MG capsule          Review of Systems  As mentioned above in the history present illness. All other systems were reviewed and are  "negative.    Physical Exam   BP: (!) 168/97  Pulse: 94  Temp: 99.1  F (37.3  C)  Resp: 19  Height: 160 cm (5' 3\")  Weight: 73.9 kg (163 lb)  SpO2: 99 %      Physical Exam  Constitutional:       General: She is in acute distress.      Appearance: She is well-developed. She is not ill-appearing.   HENT:      Head: Normocephalic and atraumatic.      Right Ear: External ear normal.      Left Ear: External ear normal.      Nose: Nose normal.      Mouth/Throat:      Mouth: Mucous membranes are moist.   Eyes:      Conjunctiva/sclera: Conjunctivae normal.   Cardiovascular:      Rate and Rhythm: Normal rate and regular rhythm.      Heart sounds: Normal heart sounds. No murmur heard.  Pulmonary:      Effort: Pulmonary effort is normal. No respiratory distress.      Breath sounds: Normal breath sounds.   Abdominal:      General: Bowel sounds are normal. There is no distension.      Palpations: Abdomen is soft.      Tenderness: There is abdominal tenderness (generalized upper).   Musculoskeletal:         General: Normal range of motion.   Skin:     General: Skin is warm and dry.      Findings: No rash.      Comments: Trocar sites upper abdomen and left abdomen all appear dry and intact. No swelling, drainage or surrounding erythema.   Neurological:      General: No focal deficit present.      Mental Status: She is alert and oriented to person, place, and time.         ED Course     ED Course as of 08/31/24 1449   Sat Aug 31, 2024   1300 I spoke with pharmacist. They were able to get prior authorization for a short supply of oxycodone for post-op pain. He is in agreement with plan.   1302 I spoke with patient's surgeon, Dr. Dozier, and discussed plan for getting pain control here in the ED and home with Oxycodone.      Procedures            No results found. However, due to the size of the patient record, not all encounters were searched. Please check Results Review for a complete set of results.    Medications   ondansetron " (ZOFRAN) injection 4 mg (4 mg Intravenous $Given 8/31/24 1250)   HYDROmorphone (PF) (DILAUDID) injection 0.5 mg (0.5 mg Intravenous $Given 8/31/24 1420)   ketorolac (TORADOL) injection 15 mg (15 mg Intravenous $Given 8/31/24 1259)       Assessments & Plan (with Medical Decision Making)     48-year-old female who presents with acute postoperative pain after having ventral hernia repair yesterday here with Dr. Dozier.  Patient on Suboxone daily.  She was given a paper prescription for oxycodone but was not able to get this filled due to needing prior authorization since her Suboxone had recently been refilled.  She did not take her Suboxone today.    On exam she appears uncomfortable.  Blood pressure is elevated, likely related to her pain.  No tachycardia.  Lung sounds CTA.  She has diffuse upper abdominal tenderness, but abdomen is otherwise soft.  She has incisional wounds in the epigastric and left side from her laparoscopic procedure yesterday.  All of the sites appear dry and intact.  No surrounding erythema or drainage to suggest incisional infection.  Patient was given IV Zofran, IV Toradol, and IV Dilaudid.  Her pain improved.  I was able to fill a prescription for oxycodone at our pharmacy here after they called to get prior authorization for her to use this for acute postoperative pain.  I did discuss the above with her surgeon, Dr. Dozier, who was also in agreement with plan.    Plan as follows:  Tylenol 650 mg every 4 hours as needed for mild/moderate pain.  Oxycodone 5 mg every 6 hours as needed for severe pain. Do not drive or drink alcohol with this medication.  ---I recommend holding your Suboxone today and tomorrow while using the oxycodone. You can start back on your Suboxone on Monday morning.    Return to the emergency department for any worsening symptoms.    Discharge Medication List as of 8/31/2024  2:41 PM        START taking these medications    Details   oxyCODONE (ROXICODONE) 5 MG tablet  Take 1 tablet (5 mg) by mouth every 6 hours as needed., Disp-12 tablet, R-0, E-Prescribe             Final diagnoses:   Acute post-operative pain       8/31/2024   [facility] EMERGENCY DEPT       Angelika Francis APRN CNP  08/31/24 0338

## 2024-09-03 ENCOUNTER — VIRTUAL VISIT (OUTPATIENT)
Dept: PSYCHOLOGY | Facility: OTHER | Age: 49
End: 2024-09-03
Payer: COMMERCIAL

## 2024-09-03 DIAGNOSIS — F41.1 GENERALIZED ANXIETY DISORDER: ICD-10-CM

## 2024-09-03 DIAGNOSIS — F33.1 MODERATE EPISODE OF RECURRENT MAJOR DEPRESSIVE DISORDER (H): Primary | ICD-10-CM

## 2024-09-03 PROCEDURE — 90837 PSYTX W PT 60 MINUTES: CPT | Mod: 95 | Performed by: COUNSELOR
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
## 2024-09-05 ENCOUNTER — NURSE TRIAGE (OUTPATIENT)
Dept: SURGERY | Facility: CLINIC | Age: 49
End: 2024-09-05
Payer: COMMERCIAL

## 2024-09-05 ENCOUNTER — HOSPITAL ENCOUNTER (EMERGENCY)
Facility: CLINIC | Age: 49
Discharge: HOME | End: 2024-09-05
Attending: EMERGENCY MEDICINE | Admitting: EMERGENCY MEDICINE
Payer: COMMERCIAL

## 2024-09-05 ENCOUNTER — APPOINTMENT (OUTPATIENT)
Dept: CT IMAGING | Facility: CLINIC | Age: 49
End: 2024-09-05
Attending: EMERGENCY MEDICINE
Payer: COMMERCIAL

## 2024-09-05 VITALS
HEIGHT: 64 IN | SYSTOLIC BLOOD PRESSURE: 121 MMHG | RESPIRATION RATE: 20 BRPM | BODY MASS INDEX: 28.36 KG/M2 | DIASTOLIC BLOOD PRESSURE: 39 MMHG | OXYGEN SATURATION: 95 % | WEIGHT: 166.1 LBS | TEMPERATURE: 98.5 F | HEART RATE: 86 BPM

## 2024-09-05 DIAGNOSIS — T40.2X5A THERAPEUTIC OPIOID INDUCED CONSTIPATION: ICD-10-CM

## 2024-09-05 DIAGNOSIS — T40.2X5A OPIOID-INDUCED HYPERALGESIA: ICD-10-CM

## 2024-09-05 DIAGNOSIS — R20.8 OPIOID-INDUCED HYPERALGESIA: ICD-10-CM

## 2024-09-05 DIAGNOSIS — K59.03 THERAPEUTIC OPIOID INDUCED CONSTIPATION: ICD-10-CM

## 2024-09-05 DIAGNOSIS — K59.09 CHRONIC CONSTIPATION: ICD-10-CM

## 2024-09-05 DIAGNOSIS — G89.18 POST-OP PAIN: ICD-10-CM

## 2024-09-05 LAB
ALBUMIN SERPL BCG-MCNC: 4.3 G/DL (ref 3.5–5.2)
ALBUMIN UR-MCNC: NEGATIVE MG/DL
ALP SERPL-CCNC: 95 U/L (ref 40–150)
ALT SERPL W P-5'-P-CCNC: 42 U/L (ref 0–50)
ANION GAP SERPL CALCULATED.3IONS-SCNC: 11 MMOL/L (ref 7–15)
APPEARANCE UR: CLEAR
AST SERPL W P-5'-P-CCNC: 42 U/L (ref 0–45)
BACTERIA #/AREA URNS HPF: ABNORMAL /HPF
BASOPHILS # BLD AUTO: 0.1 10E3/UL (ref 0–0.2)
BASOPHILS NFR BLD AUTO: 1 %
BILIRUB SERPL-MCNC: 0.3 MG/DL
BILIRUB UR QL STRIP: NEGATIVE
BUN SERPL-MCNC: 9.9 MG/DL (ref 6–20)
CALCIUM SERPL-MCNC: 9.5 MG/DL (ref 8.8–10.4)
CHLORIDE SERPL-SCNC: 100 MMOL/L (ref 98–107)
COLOR UR AUTO: YELLOW
CREAT SERPL-MCNC: 0.73 MG/DL (ref 0.51–0.95)
EGFRCR SERPLBLD CKD-EPI 2021: >90 ML/MIN/1.73M2
EOSINOPHIL # BLD AUTO: 0.1 10E3/UL (ref 0–0.7)
EOSINOPHIL NFR BLD AUTO: 1 %
ERYTHROCYTE [DISTWIDTH] IN BLOOD BY AUTOMATED COUNT: 13.1 % (ref 10–15)
GLUCOSE SERPL-MCNC: 92 MG/DL (ref 70–99)
GLUCOSE UR STRIP-MCNC: NEGATIVE MG/DL
HCO3 SERPL-SCNC: 27 MMOL/L (ref 22–29)
HCT VFR BLD AUTO: 42.7 % (ref 35–47)
HGB BLD-MCNC: 14.4 G/DL (ref 11.7–15.7)
HGB UR QL STRIP: NEGATIVE
HOLD SPECIMEN: NORMAL
IMM GRANULOCYTES # BLD: 0.1 10E3/UL
IMM GRANULOCYTES NFR BLD: 1 %
KETONES UR STRIP-MCNC: NEGATIVE MG/DL
LACTATE SERPL-SCNC: 0.8 MMOL/L (ref 0.7–2)
LEUKOCYTE ESTERASE UR QL STRIP: NEGATIVE
LIPASE SERPL-CCNC: 16 U/L (ref 13–60)
LYMPHOCYTES # BLD AUTO: 2.9 10E3/UL (ref 0.8–5.3)
LYMPHOCYTES NFR BLD AUTO: 27 %
MCH RBC QN AUTO: 29.8 PG (ref 26.5–33)
MCHC RBC AUTO-ENTMCNC: 33.7 G/DL (ref 31.5–36.5)
MCV RBC AUTO: 88 FL (ref 78–100)
MONOCYTES # BLD AUTO: 0.5 10E3/UL (ref 0–1.3)
MONOCYTES NFR BLD AUTO: 4 %
MUCOUS THREADS #/AREA URNS LPF: PRESENT /LPF
NEUTROPHILS # BLD AUTO: 7.2 10E3/UL (ref 1.6–8.3)
NEUTROPHILS NFR BLD AUTO: 67 %
NITRATE UR QL: NEGATIVE
NRBC # BLD AUTO: 0 10E3/UL
NRBC BLD AUTO-RTO: 0 /100
PH UR STRIP: 7 [PH] (ref 5–7)
PLATELET # BLD AUTO: 237 10E3/UL (ref 150–450)
POTASSIUM SERPL-SCNC: 4.2 MMOL/L (ref 3.4–5.3)
PROT SERPL-MCNC: 7.6 G/DL (ref 6.4–8.3)
RBC # BLD AUTO: 4.83 10E6/UL (ref 3.8–5.2)
RBC URINE: 2 /HPF
SODIUM SERPL-SCNC: 138 MMOL/L (ref 135–145)
SP GR UR STRIP: 1.03 (ref 1–1.03)
SQUAMOUS EPITHELIAL: 1 /HPF
UROBILINOGEN UR STRIP-MCNC: NORMAL MG/DL
WBC # BLD AUTO: 10.8 10E3/UL (ref 4–11)
WBC URINE: 3 /HPF

## 2024-09-05 PROCEDURE — 80053 COMPREHEN METABOLIC PANEL: CPT | Performed by: EMERGENCY MEDICINE

## 2024-09-05 PROCEDURE — 250N000011 HC RX IP 250 OP 636: Performed by: EMERGENCY MEDICINE

## 2024-09-05 PROCEDURE — 258N000003 HC RX IP 258 OP 636: Performed by: EMERGENCY MEDICINE

## 2024-09-05 PROCEDURE — 99284 EMERGENCY DEPT VISIT MOD MDM: CPT | Performed by: EMERGENCY MEDICINE

## 2024-09-05 PROCEDURE — 83690 ASSAY OF LIPASE: CPT | Performed by: EMERGENCY MEDICINE

## 2024-09-05 PROCEDURE — 99285 EMERGENCY DEPT VISIT HI MDM: CPT | Mod: 25 | Performed by: EMERGENCY MEDICINE

## 2024-09-05 PROCEDURE — 96372 THER/PROPH/DIAG INJ SC/IM: CPT | Performed by: EMERGENCY MEDICINE

## 2024-09-05 PROCEDURE — 83605 ASSAY OF LACTIC ACID: CPT | Performed by: EMERGENCY MEDICINE

## 2024-09-05 PROCEDURE — 250N000009 HC RX 250: Performed by: EMERGENCY MEDICINE

## 2024-09-05 PROCEDURE — 96374 THER/PROPH/DIAG INJ IV PUSH: CPT | Mod: 59 | Performed by: EMERGENCY MEDICINE

## 2024-09-05 PROCEDURE — 96375 TX/PRO/DX INJ NEW DRUG ADDON: CPT | Performed by: EMERGENCY MEDICINE

## 2024-09-05 PROCEDURE — 36415 COLL VENOUS BLD VENIPUNCTURE: CPT | Performed by: EMERGENCY MEDICINE

## 2024-09-05 PROCEDURE — 74177 CT ABD & PELVIS W/CONTRAST: CPT

## 2024-09-05 PROCEDURE — 96361 HYDRATE IV INFUSION ADD-ON: CPT | Performed by: EMERGENCY MEDICINE

## 2024-09-05 PROCEDURE — 85025 COMPLETE CBC W/AUTO DIFF WBC: CPT | Performed by: EMERGENCY MEDICINE

## 2024-09-05 PROCEDURE — 81001 URINALYSIS AUTO W/SCOPE: CPT | Performed by: EMERGENCY MEDICINE

## 2024-09-05 RX ORDER — OLANZAPINE 10 MG/1
5 INJECTION, POWDER, LYOPHILIZED, FOR SOLUTION INTRAMUSCULAR ONCE
Status: COMPLETED | OUTPATIENT
Start: 2024-09-05 | End: 2024-09-05

## 2024-09-05 RX ORDER — IOPAMIDOL 755 MG/ML
500 INJECTION, SOLUTION INTRAVASCULAR ONCE
Status: COMPLETED | OUTPATIENT
Start: 2024-09-05 | End: 2024-09-05

## 2024-09-05 RX ORDER — KETOROLAC TROMETHAMINE 30 MG/ML
30 INJECTION, SOLUTION INTRAMUSCULAR; INTRAVENOUS ONCE
Status: COMPLETED | OUTPATIENT
Start: 2024-09-05 | End: 2024-09-05

## 2024-09-05 RX ORDER — ONDANSETRON 2 MG/ML
4 INJECTION INTRAMUSCULAR; INTRAVENOUS EVERY 30 MIN PRN
Status: DISCONTINUED | OUTPATIENT
Start: 2024-09-05 | End: 2024-09-05 | Stop reason: HOSPADM

## 2024-09-05 RX ADMIN — KETOROLAC TROMETHAMINE 30 MG: 30 INJECTION, SOLUTION INTRAMUSCULAR at 19:02

## 2024-09-05 RX ADMIN — OLANZAPINE 5 MG: 10 INJECTION, POWDER, FOR SOLUTION INTRAMUSCULAR at 20:20

## 2024-09-05 RX ADMIN — HYDROMORPHONE HYDROCHLORIDE 1 MG: 1 INJECTION, SOLUTION INTRAMUSCULAR; INTRAVENOUS; SUBCUTANEOUS at 19:03

## 2024-09-05 RX ADMIN — METHYLNALTREXONE BROMIDE 12 MG: 12 INJECTION, SOLUTION SUBCUTANEOUS at 20:31

## 2024-09-05 RX ADMIN — SODIUM CHLORIDE 1000 ML: 9 INJECTION, SOLUTION INTRAVENOUS at 19:01

## 2024-09-05 RX ADMIN — ONDANSETRON 4 MG: 2 INJECTION INTRAMUSCULAR; INTRAVENOUS at 19:01

## 2024-09-05 RX ADMIN — SODIUM CHLORIDE 60 ML: 9 INJECTION, SOLUTION INTRAVENOUS at 19:15

## 2024-09-05 RX ADMIN — SODIUM CHLORIDE 1000 ML: 9 INJECTION, SOLUTION INTRAVENOUS at 20:53

## 2024-09-05 RX ADMIN — IOPAMIDOL 82 ML: 755 INJECTION, SOLUTION INTRAVENOUS at 19:15

## 2024-09-05 ASSESSMENT — COLUMBIA-SUICIDE SEVERITY RATING SCALE - C-SSRS
1. IN THE PAST MONTH, HAVE YOU WISHED YOU WERE DEAD OR WISHED YOU COULD GO TO SLEEP AND NOT WAKE UP?: NO
2. HAVE YOU ACTUALLY HAD ANY THOUGHTS OF KILLING YOURSELF IN THE PAST MONTH?: NO
6. HAVE YOU EVER DONE ANYTHING, STARTED TO DO ANYTHING, OR PREPARED TO DO ANYTHING TO END YOUR LIFE?: YES

## 2024-09-05 ASSESSMENT — ACTIVITIES OF DAILY LIVING (ADL)
ADLS_ACUITY_SCORE: 34
ADLS_ACUITY_SCORE: 36

## 2024-09-05 NOTE — TELEPHONE ENCOUNTER
Caller reporting the following red-flag symptom(s): Severe pain around incision site and has shifted down stomach after procedure.     Per the system red-flag symptom policy, patient was instructed to:  speak with a Registered Nurse    Action:  Patient warm transferred to a Registered Nurse

## 2024-09-05 NOTE — TELEPHONE ENCOUNTER
"Had hernia repair 8/30/24 . Is calling due to extreme abdominal pain at incision sites (#4) and surrounding areas. Says incision is red , perhaps more than initially after surgery. No drainage or bleeding at sites. Patient reports she uses medical marijuana for chronic pain and is also on Suboxone. For surgery, was given a block as they did not want to give her pain meds. Afterwards, they told her she should have stayed due to level of pain but was discharged. She went to ED on 8/31 due to pain and was given # 12 tabs of Oxycodone 5 mg. She took 2 tabs at a time along with her marijuana to control pain. When taking pain med with the marijuana, rating pain as 8, before then is a \"10\". Reports she has 3 tablets left of another prescription of 10 mg tablets of Oxycodone from a different provider but that is all. Afeb, No other sx of illness other than pre existing nausea associated with her chronic pain in which she uses zofran.     Is concerned with the level of pain this far out from surgery and would like phone call to discuss options. Please call patient today. Ph [phone]    Kina KAM RN  [facility]/Red Flag Triage & Med Refill Team  Reason for Disposition   SEVERE post-op pain (e.g., excruciating, pain scale 8-10) that is not controlled with pain medications    Additional Information   Negative: Sounds like a life-threatening emergency to the triager   Negative: Chest pain   Negative: Difficulty breathing   Negative: Acting confused (e.g., disoriented, slurred speech) or excessively sleepy   Negative: Surgical incision symptoms and questions   Negative: Pain or burning with passing urine (urination) and male   Negative: Pain or burning with passing urine (urination) and female   Negative: Constipation   Negative: New or worsening leg (calf, thigh) pain   Negative: New or worsening leg swelling   Negative: Dizziness is severe, or persists > 24 hours after surgery   Negative: Symptoms arising from use " of a urinary catheter (Claudio or Coude)   Negative: Cast problems or questions   Negative: Medication question   Negative: Bright red, wide-spread, sunburn-like rash   Negative: SEVERE headache and after spinal (epidural) anesthesia   Negative: Vomiting and persists > 4 hours   Negative: Vomiting and abdomen looks much more swollen than usual   Negative: Drinking very little and dehydration suspected (e.g., no urine > 12 hours, very dry mouth, very lightheaded)   Negative: Patient sounds very sick or weak to the triager   Negative: Sounds like a serious complication to the triager   Negative: Fever > 100.4 F (38.0 C)   Negative: Caller has URGENT question and triager unable to answer question    Protocols used: Post-Op Symptoms and [id number]

## 2024-09-05 NOTE — ED PROVIDER NOTES
History     Chief Complaint   Patient presents with    Post-op Problem     HPI  Radha Beckwith is a 48 year old female who presents with postoperative abdominal pain.  Said she had a hernia repair on 8/30/2024.  She said that due to the amount of pain she was having post procedure, she was told she would be admitted to the hospital, but ended up being discharged.  She came to the ED the next day since she was unable to get the pain medication filled at the pharmacy and was in severe pain.  She says that she is continue to have severe pain since the surgery and since that ED visit.  Is taking oral oxycodone without any relief.  Says that she has been having normal bowel movements and is passing gas.  Has been using medical marijuana for pain control, but says she does not like the way it makes her feel.  It seems to be the only thing that is helping with her pain.  She did call the surgery clinic, and was told that since she has had ongoing and severe pain she should be evaluated in the ER.  Her surgeon is currently out of the office and was unable to see her.  She says she has not been running a fever.  Has not been vomiting.    Allergies:  Allergies   Allergen Reactions    Ergotamine-Caffeine      12-            GI problems-    Seasonal Allergies     Sumatriptan      vomits after giving herself a shot    Compazine Anxiety and Palpitations     Severe anxiety attack    Droperidol Anxiety and Palpitations     Severe anxiety attack    Nubain [Nalbuphine Hcl] Anxiety and Palpitations     Severe anxiety attack    Prochlorperazine Anxiety and Palpitations     Uncontrolled movement, severe anxiety attack       Problem List:    Patient Active Problem List    Diagnosis Date Noted    Cervicalgia 04/02/2024     Priority: Medium    Cervical radiculopathy 02/14/2024     Priority: Medium    Chronic bilateral thoracic back pain 02/14/2024     Priority: Medium    Lumbar radiculopathy 02/14/2024     Priority: Medium     Pain of both sacroiliac joints 02/14/2024     Priority: Medium    Radicular pain of upper extremity 02/14/2024     Priority: Medium    Somatic dysfunction of pelvic region 01/10/2024     Priority: Medium    Myofascial pain 01/10/2024     Priority: Medium    Generalized anxiety disorder 11/27/2023     Priority: Medium    Chronic low back pain with bilateral sciatica 06/15/2023     Priority: Medium    Chronic neck pain 06/15/2023     Priority: Medium    Moderate episode of recurrent major depressive disorder (H) 06/29/2022     Priority: Medium    Supraventricular tachycardia (H24) 06/03/2019     Priority: Medium    Psychophysiological insomnia 12/30/2017     Priority: Medium    Chronic bilateral low back pain without sciatica 12/30/2017     Priority: Medium    Opioid dependence in remission (H) 08/02/2015     Priority: Medium     On suboxone treatement with Bryant Harkins.  (Noted in 2015).        Anxiety attack 07/31/2015     Priority: Medium    Hypokalemia 06/23/2015     Priority: Medium    Tobacco abuse 06/23/2015     Priority: Medium    Hyperlipidemia LDL goal <100 01/29/2014     Priority: Medium    GERD (gastroesophageal reflux disease) 07/28/2010     Priority: Medium     Takes omeprazole and metoclopramide      Restless legs 07/28/2010     Priority: Medium        Past Medical History:    Past Medical History:   Diagnosis Date    Abdominal pain, right lower quadrant 03/09/2008    Anxiety attack 07/31/2015    Atypical chest pain 06/23/2015    De Quervain's disease (tenosynovitis)     Dehydration     Depressive disorder 1996    Gastric ulcer 07/31/2015    GERD (gastroesophageal reflux disease) 07/28/2010    Hypertension 2017    Ingrowing nail 01/09/2014    Migraines     Opioid dependence in remission (H) 08/02/2015    Other and unspecified ovarian cyst     Papanicolaou smear of cervix with low grade squamous intraepithelial lesion (LGSIL) 07/07/2017       Past Surgical History:    Past Surgical  History:   Procedure Laterality Date    BIOPSY CERVICAL, LOCAL EXCISION, SINGLE/MULTIPLE N/A 8/10/2017    Procedure: BIOPSY CERVICAL, LOCAL EXCISION, SINGLE/MULTIPLE;;  Surgeon: Michael Chandler MD;  [facility]    COLONOSCOPY N/A 1/6/2023    Procedure: COLONOSCOPY;  Surgeon: Michael Dozier MD;  [facility]    COLPOSCOPY, BIOPSY, COMBINED N/A 8/10/2017    Procedure: COMBINED COLPOSCOPY, BIOPSY;  Colposcopy with Cervical Biopsies and Endometrial Biopsy, Exam with Ultrasound;  Surgeon: Michael Chandler MD;  [facility]    ESOPHAGOSCOPY, GASTROSCOPY, DUODENOSCOPY (EGD), COMBINED N/A 4/17/2017    Procedure: COMBINED ESOPHAGOSCOPY, GASTROSCOPY, DUODENOSCOPY (EGD);  Surgeon: Ibrahima Epsosito MD;  [facility]    ESOPHAGOSCOPY, GASTROSCOPY, DUODENOSCOPY (EGD), COMBINED N/A 1/6/2023    Procedure: ESOPHAGOGASTRODUODENOSCOPY, WITH BIOPSY;  Surgeon: Michael Dozier MD;  [facility]    ESOPHAGOSCOPY, GASTROSCOPY, DUODENOSCOPY (EGD), COMBINED N/A 10/3/2023    Procedure: ESOPHAGOGASTRODUODENOSCOPY, WITH BIOPSY;  Surgeon: John Paul Varela MD;  [facility]    EXAM UNDER ANESTHESIA PELVIC N/A 8/10/2017    Procedure: EXAM UNDER ANESTHESIA PELVIC;;  Surgeon: Michael Chandler MD;  [facility]    HERNIORRHAPHY, INCISIONAL, ROBOT-ASSISTED, LAPAROSCOPIC, USING DA JERRELL XI N/A 8/30/2024    Procedure: HERNIORRHAPHY, INCISIONAL, ROBOT-ASSISTED, LAPAROSCOPIC, USING DA JERRELL XI;  Surgeon: Michael Dozier MD;  [facility]    HYSTERECTOMY      HYSTERECTOMY, PAP NO LONGER INDICATED      INJECT EPIDURAL CERVICAL N/A 10/20/2023    Procedure: Cervical 6-7 Interlaminar epidural steroid injection using fluoroscopic guidance with contrast dye.;  Surgeon: Trevor Ivory MD;  [facility]    INJECT EPIDURAL CERVICAL N/A 4/19/2024    Procedure: Cervical 6 - Cervical 7 Interlaminar epidural steroid injection using fluoroscopic guidance with contrast dye.;  Surgeon: Trevor Ivory  MD;  [facility]    INJECT EPIDURAL LUMBAR Bilateral 2022    Procedure: Lumbar 5-Sacral 1 Transforaminal Epidural Steroid Injection with fluoroscopic guidance and contrast, bilateral;  Surgeon: Trevor Ivory MD;  [facility]    LAPAROSCOPIC CHOLECYSTECTOMY N/A 2018    Procedure: LAPAROSCOPIC CHOLECYSTECTOMY;  Laparoscopic Cholecystectomy;  Surgeon: Tigre Lowry DO;  [facility]    LAPAROSCOPIC HYSTERECTOMY TOTAL N/A 10/30/2017    Procedure: LAPAROSCOPIC HYSTERECTOMY TOTAL;  LAPAROSCOPIC HYSTERECTOMY TOTAL POSSIBLE SALPINGO-OOPHERECTOMY (BILATERAL);  Surgeon: Michael Chandler MD;  [facility]    ZZHC UGI ENDOSCOPY, SIMPLE EXAM  08       Family History:    Family History   Problem Relation Age of Onset    Depression Mother     Respiratory Mother     Chronic Obstructive Pulmonary Disease Mother     Hypertension Mother     Anxiety Disorder Mother     Cerebrovascular Disease Father         First stroke at age 28,  from 2nd when he was 55. Brain aneurysms.    Breast Cancer Cousin     Adrenal Disorder Other     Chronic Obstructive Pulmonary Disease Other     Asthma Brother        Social History:  Marital Status:  Single [1]  Social History     Tobacco Use    Smoking status: Every Day     Current packs/day: 0.25     Average packs/day: 0.3 packs/day for 20.0 years (5.0 ttl pk-yrs)     Types: Cigarettes     Passive exposure: Never    Smokeless tobacco: Never   Vaping Use    Vaping status: Former    Substances: Nicotine, CBD    Devices: [employer]   Substance Use Topics    Alcohol use: Yes     Comment: Occasionaly    Drug use: No        Medications:    acetaminophen (TYLENOL) 500 MG tablet  ALPRAZolam (XANAX) 0.5 MG tablet  baclofen (LIORESAL) 20 MG tablet  bisacodyl (DULCOLAX) 5 MG EC tablet  buprenorphine HCl-naloxone HCl (SUBOXONE) 2-0.5 MG per film  busPIRone HCl (BUSPAR) 30 MG tablet  CONSTULOSE 10 GM/15ML solution  cyclobenzaprine (FLEXERIL) 5 MG  "tablet  DULoxetine (CYMBALTA) 60 MG capsule  famotidine (PEPCID) 40 MG tablet  linaclotide (LINZESS) 72 MCG capsule  medical cannabis (Patient's own supply)  ondansetron (ZOFRAN ODT) 4 MG ODT tab  oxyCODONE (ROXICODONE) 5 MG tablet  pantoprazole (PROTONIX) 40 MG EC tablet  propranolol (INDERAL) 10 MG tablet  rOPINIRole (REQUIP) 1 MG tablet  simvastatin (ZOCOR) 10 MG tablet  temazepam (RESTORIL) 15 MG capsule          Review of Systems   All other systems reviewed and are negative.      Physical Exam   BP: (!) 146/101  Pulse: 113  Temp: 98.5  F (36.9  C)  Resp: 20  Height: 162.6 cm (5' 4\")  Weight: 75.3 kg (166 lb 1.6 oz)  SpO2: 99 %      Physical Exam  Vitals and nursing note reviewed.   Constitutional:       Appearance: She is not toxic-appearing.   HENT:      Mouth/Throat:      Mouth: Mucous membranes are moist.   Cardiovascular:      Rate and Rhythm: Normal rate and regular rhythm.   Pulmonary:      Effort: Pulmonary effort is normal.   Abdominal:      General: Bowel sounds are normal.      Palpations: Abdomen is soft.      Tenderness: There is abdominal tenderness. There is no guarding.      Comments: Yellowish bruising over the epigastric region, incisions from recent laparoscopic herniorrhaphy are intact, no evidence of surrounding erythema or findings concerning for cellulitis.  She does have generalized tenderness without rigidity.  Also has few areas of erythematous papular rash, thought to be contact irritation from cardiac monitoring stickers placed during her recent procedure.   Skin:     General: Skin is warm.   Neurological:      Mental Status: She is alert.         ED Course        Procedures              Critical Care time:  none               Results for orders placed or performed during the hospital encounter of 09/05/24 (from the past 24 hour(s))   CBC with platelets differential    Narrative    The following orders were created for panel order CBC with platelets differential.  Procedure           "                     Abnormality         Status                     ---------                               -----------         ------                     CBC with platelets and d...[[id number]]                      Final result                 Please view results for these tests on the individual orders.   Comprehensive metabolic panel   Result Value Ref Range    Sodium 138 135 - 145 mmol/L    Potassium 4.2 3.4 - 5.3 mmol/L    Carbon Dioxide (CO2) 27 22 - 29 mmol/L    Anion Gap 11 7 - 15 mmol/L    Urea Nitrogen 9.9 6.0 - 20.0 mg/dL    Creatinine 0.73 0.51 - 0.95 mg/dL    GFR Estimate >90 >60 mL/min/1.73m2    Calcium 9.5 8.8 - 10.4 mg/dL    Chloride 100 98 - 107 mmol/L    Glucose 92 70 - 99 mg/dL    Alkaline Phosphatase 95 40 - 150 U/L    AST 42 0 - 45 U/L    ALT 42 0 - 50 U/L    Protein Total 7.6 6.4 - 8.3 g/dL    Albumin 4.3 3.5 - 5.2 g/dL    Bilirubin Total 0.3 <=1.2 mg/dL   Lipase   Result Value Ref Range    Lipase 16 13 - 60 U/L   Lactic acid whole blood with 1x repeat in 2 hr when >2   Result Value Ref Range    Lactic Acid, Initial 0.8 0.7 - 2.0 mmol/L   CBC with platelets and differential   Result Value Ref Range    WBC Count 10.8 4.0 - 11.0 10e3/uL    RBC Count 4.83 3.80 - 5.20 10e6/uL    Hemoglobin 14.4 11.7 - 15.7 g/dL    Hematocrit 42.7 35.0 - 47.0 %    MCV 88 78 - 100 fL    MCH 29.8 26.5 - 33.0 pg    MCHC 33.7 31.5 - 36.5 g/dL    RDW 13.1 10.0 - 15.0 %    Platelet Count 237 150 - 450 10e3/uL    % Neutrophils 67 %    % Lymphocytes 27 %    % Monocytes 4 %    % Eosinophils 1 %    % Basophils 1 %    % Immature Granulocytes 1 %    NRBCs per 100 WBC 0 <1 /100    Absolute Neutrophils 7.2 1.6 - 8.3 10e3/uL    Absolute Lymphocytes 2.9 0.8 - 5.3 10e3/uL    Absolute Monocytes 0.5 0.0 - 1.3 10e3/uL    Absolute Eosinophils 0.1 0.0 - 0.7 10e3/uL    Absolute Basophils 0.1 0.0 - 0.2 10e3/uL    Absolute Immature Granulocytes 0.1 <=0.4 10e3/uL    Absolute NRBCs 0.0 10e3/uL   [place] Draw    Narrative    The following orders  were created for panel order [place] Draw.  Procedure                               Abnormality         Status                     ---------                               -----------         ------                     Extra Blue Top Tube[[id number]]                              Final result                 Please view results for these tests on the individual orders.   Extra Blue Top Tube   Result Value Ref Range    Hold Specimen [facility]    CT Abdomen Pelvis w Contrast    Narrative    EXAM: CT ABDOMEN PELVIS W CONTRAST  LOCATION: [facility] [place]  DATE: 9/5/2024    INDICATION: Persistent abdominal pain after hernia surgery  COMPARISON: 2/17/2023  TECHNIQUE: CT scan of the abdomen and pelvis was performed following injection of IV contrast. Multiplanar reformats were obtained. Dose reduction techniques were used.  CONTRAST: Isovue 370, 82mL    FINDINGS:   LOWER CHEST: Mild dependent atelectasis. No focal airspace disease. Trace gas in the right epicardial fat pad, most likely postsurgical.    HEPATOBILIARY: The hepatic dome is not included in the field-of-view, limiting evaluation. Liver otherwise appears normal. Status post cholecystectomy. No biliary dilatation.    PANCREAS: Normal.    SPLEEN: Few punctate calcifications in the spleen, compatible with old granulomatous disease.    ADRENAL GLANDS: Normal.    KIDNEYS/BLADDER: Normal.    BOWEL: A 2.6 cm metallic surgical device in the cecum (series 3, image 165). No bowel obstruction or evidence of bowel inflammation. Normal appendix. No evidence of acute appendicitis.    PERITONEUM/RETROPERITONEUM: Mild fat stranding in the upper omentum. No free fluid or fluid collection. No intraperitoneal free air.    LYMPH NODES: No lymphadenopathy.    VASCULATURE: Mild atherosclerotic calcifications.    PELVIC ORGANS: Status post hysterectomy.    MUSCULOSKELETAL: Mild subcutaneous fat stranding at the previously visualized upper midline ventral abdominal  wall hernia. Scattered soft tissue gas in the left upper quadrant ventral abdominal wall subcutaneous fat. Mild multilevel degenerative changes   of the spine.      Impression    IMPRESSION:   1.  Postsurgical changes related to upper midline ventral abdominal wall hernia repair, with mild residual subcutaneous and omental fat stranding as well as left upper quadrant ventral abdominal wall subcutaneous gas. No focal fluid collection.    2.  A 2.6 cm metallic surgical device is located in the cecum, which may be related to prior endoscopic procedure, possibly migrated. Correlate with surgical history.   UA with Microscopic reflex to Culture    Specimen: Urine, Clean Catch   Result Value Ref Range    Color Urine Yellow Colorless, Straw, Light Yellow, Yellow    Appearance Urine Clear Clear    Glucose Urine Negative Negative mg/dL    Bilirubin Urine Negative Negative    Ketones Urine Negative Negative mg/dL    Specific Gravity Urine 1.032 1.003 - 1.035    Blood Urine Negative Negative    pH Urine 7.0 5.0 - 7.0    Protein Albumin Urine Negative Negative mg/dL    Urobilinogen Urine Normal Normal, 2.0 mg/dL    Nitrite Urine Negative Negative    Leukocyte Esterase Urine Negative Negative    Bacteria Urine Few (A) None Seen /HPF    Mucus Urine Present (A) None Seen /LPF    RBC Urine 2 <=2 /HPF    WBC Urine 3 <=5 /HPF    Squamous Epithelials Urine 1 <=1 /HPF    Narrative    Urine Culture not indicated     *Note: Due to a large number of results and/or encounters for the requested time period, some results have not been displayed. A complete set of results can be found in Results Review.       Medications   ondansetron (ZOFRAN) injection 4 mg (4 mg Intravenous $Given 9/5/24 1901)   sodium chloride 0.9% BOLUS 1,000 mL (0 mLs Intravenous Stopped 9/5/24 2020)   ketorolac (TORADOL) injection 30 mg (30 mg Intravenous $Given 9/5/24 1902)   HYDROmorphone (DILAUDID) injection 1 mg (1 mg Intravenous $Given 9/5/24 1903)   iopamidol  (ISOVUE-370) solution 500 mL (82 mLs Intravenous $Given 9/5/24 1915)   sodium chloride 0.9 % bag 100mL for CT scan flush use (60 mLs Intravenous $Given 9/5/24 1915)   OLANZapine (zyPREXA) IV injection 5 mg (5 mg Intravenous $Given 9/5/24 2020)   methylnaltrexone (RELISTOR) injection 12 mg (12 mg Subcutaneous $Given 9/5/24 2031)   sodium chloride 0.9% BOLUS 1,000 mL (0 mLs Intravenous Stopped 9/5/24 2130)       Assessments & Plan (with Medical Decision Making)  Linette is a 48-year-old female status post hernia repair presenting with continued postoperative pain.  See history and physical exam as above  48-year-old female in no acute distress, is hypertensive with blood pressure of 146/101 and is slightly tachycardic with heart rate of 113 but otherwise vitally stable and afebrile.  Surgical incisions are intact, no surrounding erythema to suggest cellulitis, no purulent drainage, and no palpable mass is appreciated.  There is no rigidity to her abdomen.  Since she has had ongoing pain since surgery, we will plan to get labs and imaging, and will give IV pain medication.  Does states she would be able to get a safe ride home if needed  Labs and imaging as above.  Reassuringly, there is no acute postoperative complication that is appreciated.  However, when CT imaging was reviewed, it does appear that patient is significantly constipated.  There is a large volume of stool noted throughout the entirety of her colon.  I suspect this is opioid-induced constipation and may be related to her recent surgery, and continued opioid medication would make this worse.  She was given a dose of Relistor subcutaneously to see if this would help.  She was also given a dose of Zyprexa and Toradol IV for nonnarcotic pain management.  She reports that this has not helped and requests additional doses of Dilaudid stating that it usually takes a few doses to get control of her pain.  I did have an extensive discussion with her regarding the  findings.  She was reassured that there is no acute postoperative complication noted.  She was upset that no further narcotic pain medication would be provided.  I explained that this is likely contributing to her ongoing symptoms, and suspect that her chronic use of opioid pain medication is leading to opioid-induced hyperalgesia in the setting of the recent insult from surgery.  Hopefully, by abstaining from opioids, her pain will improve, and the constipation will improve, which could also contribute to pain relief.  She needs to follow with her primary provider and her surgeon as directed.  Also needs to follow-up with her pain management provider.  ED return precautions were discussed.  Discharged in stable condition.     I have reviewed the nursing notes.    I have reviewed the findings, diagnosis, plan and need for follow up with the patient.           Medical Decision Making  The patient's presentation was of moderate complexity (2 or more stable chronic illnesses).    The patient's evaluation involved:  ordering and/or review of 3+ test(s) in this encounter (see separate area of note for details)    The patient's management necessitated moderate risk (prescription drug management including medications given in the ED).        Discharge Medication List as of 9/5/2024  9:30 PM          Final diagnoses:   Opioid-induced hyperalgesia   Chronic constipation   Therapeutic opioid induced constipation   Post-op pain       9/5/2024   [facility] EMERGENCY DEPT       Jessica Don,   09/11/24 8941

## 2024-09-05 NOTE — ED TRIAGE NOTES
Comes in with abdominal pain, had hernia surgery Friday, called her surgeons office and was told to go to the ED.  States took her last 2 oxycodone at 1200 am today.      Triage Assessment (Adult)       Row Name 09/05/24 1567          Triage Assessment    Airway WDL WDL        Respiratory WDL    Respiratory WDL WDL        Skin Circulation/Temperature WDL    Skin Circulation/Temperature WDL WDL        Cardiac WDL    Cardiac WDL WDL        Cognitive/Neuro/Behavioral WDL    Cognitive/Neuro/Behavioral WDL WDL

## 2024-09-05 NOTE — TELEPHONE ENCOUNTER
Writer spoke with patient regarding below. Patient underwent a laparoscopic herniorrhaphy on 8/30 with Dr. Dozier. See Triage note below. Patient denies fever or chills. Patient is passing gas and having normal bowel movements. Patient stated that she took her last two oxycodone 5 mg tabs x two hours ago and her pain is currently 7/10. Patient stated that her pain level was greater but in addition to the oxycodone the patient reported using medical cannabis as well. Writer did advise against this as this can cause respiratory depression. Patient stated that this is the only thing that has been helping her pain. Patient stated that she has been having to do quite a bit herself at home as she has no help. Patient has not been utilizing ice and endorses uses Tylenol prn. Patient did state that the incision sites are clean, dry and intact, with minimal redness, no swelling noted. Patient stated that since her surgery date she has not been able to get her pain level under control or at a manageable degree. Based off of the above information and patient stating her pain is unbearable, this RN advised patient to be evaluated at the emergency department with someone else to drive. Patient is comfortable with this plan and has no further questions or concerns.    Mirela Schulz RN on 9/5/2024 at 2:41 PM

## 2024-09-06 ENCOUNTER — TELEPHONE (OUTPATIENT)
Dept: ADMISSION | Facility: CLINIC | Age: 49
End: 2024-09-06
Payer: COMMERCIAL

## 2024-09-06 RX ORDER — OXYCODONE HYDROCHLORIDE 5 MG/1
5 TABLET ORAL EVERY 6 HOURS PRN
Qty: 10 TABLET | Refills: 0 | Status: CANCELLED | OUTPATIENT
Start: 2024-09-06

## 2024-09-06 NOTE — TELEPHONE ENCOUNTER
Patient sill in a lot of pain. Rates pain of 8/10. Felt a little better after having a BM this morning. Slept for about 3-4 hours. Taking tylenol 1000mg q6h, cyclobenzaprine, baclofen, started suboxone last night. Wearing abdominal binder.    Patient doesn't feel she has the opiod-induced hyperalgesia. Questioning if she has nerve pain.    Writer spoke with Dr. Lowry over the phone (since Jacoby is out). Reviewed surgery date, pain, ED visit, and asked if this could be nerve pain. His response that it is likely not nerve pain and it's too early to tell.    Returned call to patient. Who expressed frustration and feels nobody will help her. She says she's in more pain than she should be and asked if there's someone else she could talk to. I told her I'd ask for a small refill of oxycodone and move her post-op appointment to Monday. I then learned that she had restarted her suboxone last night after the ED visit, so she would not be able to get the script for oxycodone. Writer told patient to continue suboxone (per ED note to restart after oxycodone doses), tylenol, muscle relaxers, abdominal binder, and present to clinic at rescheduled appointment time Monday for in-person physical evaluation.    Edith Wiley RN on 9/6/2024 at 5:14 PM

## 2024-09-06 NOTE — DISCHARGE INSTRUCTIONS
Your blood work looks excellent.  I do not see any indication of dehydration, abnormal electrolytes, elevated white count to suggest infection or inflammation, no evidence of postoperative infection or sepsis    Your CT scan does not show any acute intra-abdominal abnormality.  They did see some changes that are related to your recent surgery, but did not see a bowel blockage, evidence of an abscess or infection, any intra-abdominal bleeding, or any cause for alarm    Do have a very large amount of stool throughout your colon.  I suspect this is related to both chronic opioid medications (Suboxone), recent acute postoperative opioid prescriptions, and recent surgery.  You were given an injection of Relistor which can help with opioid-induced constipation, and you should continue to use your prescribed home medications to help with the stool burden    At this time, since no emergent medical condition was found, and I suspect opioid pain medication is causing significant issues related to your current acute severe pain, I strongly recommend that you do not take any opioid pain medications at this time.  You likely have something called opioid-induced hyperalgesia, where you have a more severe pain response due to being on opioids, and treatment is to hold these doses.  This will ultimately resolve the pain    Keep your follow-up appointment with Dr. Dozier as scheduled    If you develop a fever, significant redness spreading from around the wounds, pus draining from the wounds, vomiting and inability to tolerate anything by mouth, or any new concerns do not hesitate to return to the emergency room for evaluation

## 2024-09-06 NOTE — TELEPHONE ENCOUNTER
Reason for Call:  Pain control issues - please refer to 9/5 nurse triage note     Detailed comments: still having issue with pain control.  Went to ED last night and she went through the notes.  Needs to speak with Jenna again she said.    Phone Number Patient can be reached at: Cell number on file:    Telephone Information:   Mobile [phone]       Best Time: anytime    Can we leave a detailed message on this number? YES    Call taken on 9/6/2024 at 2:41 PM by Nena Staples

## 2024-09-09 ENCOUNTER — OFFICE VISIT (OUTPATIENT)
Dept: SURGERY | Facility: CLINIC | Age: 49
End: 2024-09-09
Payer: COMMERCIAL

## 2024-09-09 ENCOUNTER — TELEPHONE (OUTPATIENT)
Dept: SURGERY | Facility: CLINIC | Age: 49
End: 2024-09-09

## 2024-09-09 VITALS
BODY MASS INDEX: 28.49 KG/M2 | DIASTOLIC BLOOD PRESSURE: 82 MMHG | WEIGHT: 166 LBS | TEMPERATURE: 96.3 F | SYSTOLIC BLOOD PRESSURE: 122 MMHG

## 2024-09-09 DIAGNOSIS — G89.18 POST-OP PAIN: Primary | ICD-10-CM

## 2024-09-09 PROCEDURE — 99213 OFFICE O/P EST LOW 20 MIN: CPT | Performed by: SPECIALIST

## 2024-09-09 RX ORDER — OXYCODONE HYDROCHLORIDE 5 MG/1
5-10 TABLET ORAL EVERY 6 HOURS PRN
Qty: 15 TABLET | Refills: 0 | Status: SHIPPED | OUTPATIENT
Start: 2024-09-09 | End: 2024-09-12

## 2024-09-09 RX ORDER — OXYCODONE HYDROCHLORIDE 5 MG/1
5-10 TABLET ORAL EVERY 6 HOURS PRN
Qty: 15 TABLET | Refills: 0 | Status: SHIPPED | OUTPATIENT
Start: 2024-09-09 | End: 2024-09-09

## 2024-09-09 ASSESSMENT — PAIN SCALES - GENERAL: PAINLEVEL: SEVERE PAIN (7)

## 2024-09-09 NOTE — TELEPHONE ENCOUNTER
Prior Authorization Retail Medication Request    Medication/Dose: Oxycodone 5 mg  Diagnosis and ICD code (if different than what is on RX):    New/renewal/insurance change PA/secondary ins. PA:  Previously Tried and Failed:    Rationale:      Insurance   Primary: [employer]  Insurance ID:  [id number]    Secondary (if applicable):  Insurance ID:      Pharmacy Information (if different than what is on RX)  Name:  [facility]  Phone:  [phone]  Fax:[phone]      Thank you,  Jahaira Ferrer, Pharmacy Tech  [facility]

## 2024-09-09 NOTE — PROGRESS NOTES
Follow-up for robotically assisted ventral hernia repair.    Subjective:  Patient feels okay other than some pain issues.  She has known chronic pain.  Apparently she is sees a pain psychologist and not true pain management.  She tells me this now.  Would like a refill of her pain meds.    Objective:  B/P: 122/82, T: 96.3, P: Data Unavailable, R: Data Unavailable  Back: Incisions healing well.  No signs of infection.  Some ecchymoses around the old hernia site.    Assessment/plan:  This is a 48-year-old lady status post a robotic ventral hernia repair.  Doing well other than her known chronic pain issues.  I did offer her a pain management consultation with a true pain management physician.  She states she wants to speak to her pain psychologist first for her recommendation.  I will give her a short refill of her pain meds while she sorts this out.  She will follow-up with me as necessary.      Michael Dozier MD, FACS

## 2024-09-09 NOTE — LETTER
9/9/2024      Radha Beckwith  [id number] [facility] [address]      Dear Colleague,    Thank you for referring your patient, Radha Beckwith, to the [facility]. Please see a copy of my visit note below.    Follow-up for robotically assisted ventral hernia repair.    Subjective:  Patient feels okay other than some pain issues.  She has known chronic pain.  Apparently she is sees a pain psychologist and not true pain management.  She tells me this now.  Would like a refill of her pain meds.    Objective:  B/P: 122/82, T: 96.3, P: Data Unavailable, R: Data Unavailable  Back: Incisions healing well.  No signs of infection.  Some ecchymoses around the old hernia site.    Assessment/plan:  This is a 48-year-old lady status post a robotic ventral hernia repair.  Doing well other than her known chronic pain issues.  I did offer her a pain management consultation with a true pain management physician.  She states she wants to speak to her pain psychologist first for her recommendation.  I will give her a short refill of her pain meds while she sorts this out.  She will follow-up with me as necessary.      Michael Dozier MD, FACS      Again, thank you for allowing me to participate in the care of your patient.        Sincerely,        Michael Dzoier MD

## 2024-09-09 NOTE — TELEPHONE ENCOUNTER
Reason for Call:  Other appointment    Detailed comments: Patient calling as she was told she needs to cancel the pain med prescription for Sumayay White and have it sent to [facility] pharmacy     Phone Number Patient can be reached at: Home number on file [phone] (home)    Best Time: any    Can we leave a detailed message on this number? YES    Call taken on 9/9/2024 at 3:20 PM by Edith Quinones

## 2024-09-10 ENCOUNTER — MYC REFILL (OUTPATIENT)
Dept: GASTROENTEROLOGY | Facility: CLINIC | Age: 49
End: 2024-09-10

## 2024-09-10 ENCOUNTER — TELEPHONE (OUTPATIENT)
Dept: FAMILY MEDICINE | Facility: CLINIC | Age: 49
End: 2024-09-10

## 2024-09-10 ENCOUNTER — NURSE TRIAGE (OUTPATIENT)
Dept: FAMILY MEDICINE | Facility: CLINIC | Age: 49
End: 2024-09-10

## 2024-09-10 ENCOUNTER — VIRTUAL VISIT (OUTPATIENT)
Dept: PALLIATIVE MEDICINE | Facility: CLINIC | Age: 49
End: 2024-09-10
Payer: COMMERCIAL

## 2024-09-10 DIAGNOSIS — M54.41 CHRONIC BILATERAL LOW BACK PAIN WITH BILATERAL SCIATICA: ICD-10-CM

## 2024-09-10 DIAGNOSIS — M54.6 CHRONIC BILATERAL THORACIC BACK PAIN: ICD-10-CM

## 2024-09-10 DIAGNOSIS — G89.29 CHRONIC NECK PAIN: ICD-10-CM

## 2024-09-10 DIAGNOSIS — G89.29 CENTRAL SENSITIZATION TO PAIN: ICD-10-CM

## 2024-09-10 DIAGNOSIS — M54.42 CHRONIC BILATERAL LOW BACK PAIN WITH BILATERAL SCIATICA: ICD-10-CM

## 2024-09-10 DIAGNOSIS — M54.2 CHRONIC NECK PAIN: ICD-10-CM

## 2024-09-10 DIAGNOSIS — M79.18 MYOFASCIAL PAIN: ICD-10-CM

## 2024-09-10 DIAGNOSIS — T40.2X5A CONSTIPATION DUE TO OPIOID THERAPY: ICD-10-CM

## 2024-09-10 DIAGNOSIS — K59.03 CONSTIPATION DUE TO OPIOID THERAPY: ICD-10-CM

## 2024-09-10 DIAGNOSIS — M47.812 SPONDYLOSIS OF CERVICAL REGION WITHOUT MYELOPATHY OR RADICULOPATHY: ICD-10-CM

## 2024-09-10 DIAGNOSIS — M47.812 ARTHROPATHY OF CERVICAL FACET JOINT: Primary | ICD-10-CM

## 2024-09-10 DIAGNOSIS — G89.29 CHRONIC BILATERAL LOW BACK PAIN WITH BILATERAL SCIATICA: ICD-10-CM

## 2024-09-10 DIAGNOSIS — G89.29 CHRONIC BILATERAL THORACIC BACK PAIN: ICD-10-CM

## 2024-09-10 PROCEDURE — 96158 HLTH BHV IVNTJ INDIV 1ST 30: CPT | Mod: 95 | Performed by: PSYCHOLOGIST

## 2024-09-10 PROCEDURE — 96159 HLTH BHV IVNTJ INDIV EA ADDL: CPT | Mod: 95 | Performed by: PSYCHOLOGIST

## 2024-09-10 NOTE — PROGRESS NOTES
Radha Beckwith is a 48 year old female who is being evaluated via a billable video visit.      Patient is currently in the [facility]? 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 Post-operative pain has overpowered everything else. Your mood is 'ok' - upset partner never showed up for surgery - report he has been either drunk or hungover since then. Your activity level is reduced in general, although report partner has not been very helpful post-surgically. Your stress level is high - post-operative pain, partner's lack of support. Your sleep is disrupted by pain. You reported engaging in self-care for your pain a couple times daily.    You identified that you would like to focus on the following or had questions regarding the following issues or concerns, and we discussed the following:   - not feeling well at all since surgery - two ED visits, now constipated  - report temperature has been quite low per oral thermometer as low as 95, rechecked in session and is 97 - encouraged to contact surgical clinic's nurse triage regarding temperature fluctuation - call surgical team do not my chart message  - you believe MBB #2 has been cancelled, however it seems to be on calendar yet - you will call after session  - continuing to smoke about 1-2 cigarettes daily, also wearing nicotine patch - advised to not smoke any cigarettes if you are using  the patch  - you report feeling fairly comfortable with medical knowledge from caring for your mother, however as we discussed even if you had a medical degree, it is better to be assessed by a medical professional    ASSESSMENT: Linette reports both surgery and recovery have been challenging due to general lack of support from her partner - she has been addressing this in context of individual therapy. Discussed concern about her self-report of low temperature and sensation of feeling quite cold upon waking today, as well as her general appearance which was quite pale for her. She reports ongoing high pain, which is to be somewhat expected and per ED note likely due to hyperalgesia. Encouraged her to seek medical advice rather than relying on what she has learned from taking care of her mother in the past.    PLAN:   Your next appointment is scheduled for 9/24 at 11:00 AM.  Assignment/Objectives /interventions for next session:   - call surgical team regarding low temperature and other symptoms    We believe regular attendance is key to your success in our program!    Any time you are unable to keep your appointment we ask that you call us at [phone] at least 24 hours in advance to cancel.This will allow us to offer the appointment time to another patient.   Multiple missed appointments may lead to dismissal from the clinic.    Telemedicine Visit: The patient's condition can be safely assessed and treated via synchronous audio and visual telemedicine encounter.      Reason for Telemedicine Visit: Services only offered telehealth    Originating Site (Patient Location): Patient's home    Distant Site (Provider Location): Provider Remote Setting- Home Office    Consent:  The patient/guardian has verbally consented to: the potential risks and benefits of telemedicine (video visit) versus in person care; bill my insurance or make self-payment for services provided; and responsibility for payment of non-covered  services.     Mode of Communication:  Video Conference via [employer]    As the provider I attest to compliance with applicable laws and regulations related to telemedicine.      Karen Kohler PsyD LP  Licensed Psychologist  Outpatient Clinic Therapist  [facility]

## 2024-09-10 NOTE — TELEPHONE ENCOUNTER
Reema GAR from [facility]. Patient is scheduled for Medial branch block injected into cervical region today and is calling to make sure there are not contraindications for the patient. Patient had hernia repair 8-30    She notes there is not steroid in the injection.      On schedule for 1330 today  Will route to PCP    Malathi Bruno RN on 9/10/2024 at 9:26 AM

## 2024-09-10 NOTE — TELEPHONE ENCOUNTER
"Nurse Triage SBAR    Is this a 2nd Level Triage? NO    Situation: reporting low temperature following surgery 8/30/24    Background: Surgery 8/30, is seen by pain clinic, depression    Assessment: Patient feels this is abnormal and her pain doctor recommended she be seen by her PCP.   She has been taking 3000mg of tylenol daily and Oxycodone 5mg   Protocol Recommended Disposition:   See in Office Within 3 Days    Recommendation: Per RN protocol advised patient to be seen within 3 days.  Advised patient of home care instructions per care advice. Patient scheduled for visit on 9/11/24 , patient was informed to arrived 20 minutes prior to scheduled visit for check-in. Patient advised to seek urgent care or ED for worsening symptoms per protocol. Patient expressed verbal understanding.     Erin Cueto RN on 9/10/2024 at 4:52 PM      Reason for Disposition   Nursing judgment    Additional Information   Negative: Sounds like a life-threatening emergency to the triager   Negative: Information only call about a Well Adult (no illness or injury)   Negative: Caller can't be reached by phone   Negative: Nursing judgment   Negative: Nursing judgment   Negative: Nursing judgment   Negative: Nursing judgment   Negative: Nursing judgment   Negative: Nursing judgment   Negative: Patient wants to be seen   Negative: Nursing judgment   Negative: Nursing judgment   Negative: Nursing judgment    Answer Assessment - Initial Assessment Questions  1. REASON FOR CALL: \"What is your main concern right now?\"      Low temperature  2. ONSET: \"When did the symptoms start?\"      Since 8/30/24- after surgery  3. SEVERITY: \"How bad is the low temp?\"      Temp 95   4. FEVER: \"Do you have a fever?\"      No  5. OTHER SYMPTOMS: \"Do you have any other new symptoms?\"      No  6. TREATMENTS AND RESPONSE: \"What have you done so far to try to make this better? What medicines have you used?\"      Heating pads, and heating blankets  7. PREGNANCY: \"Is " "there any chance you are pregnant?\" \"When was your last menstrual period?\"      no    Protocols used: No Protocol [id number]    "

## 2024-09-10 NOTE — TELEPHONE ENCOUNTER
Writer called and spoke with CONG Benavidez and relayed information below from her PCP.     Stacie Selby RN on 9/10/2024 at 9:37 AM

## 2024-09-10 NOTE — TELEPHONE ENCOUNTER
Patient scheduled to repeat MBB#1 on this date.   Patient notified CMA during call that she recently had surgery on 8/30.     Per chart review patient underwent hernia repair on 8/30/2024 with Dr. Dozier's.   Contacted surgeon's office and spoke with triage who will return response via shared line on this date.     Reema Gaffney RN

## 2024-09-10 NOTE — TELEPHONE ENCOUNTER
Approval was given from PCP but noted that provider would defer to surgeon.     Spoke with patient who identifies she woke with a temp of 95.6 this morning and is experiencing significant pain from recent procedure.  Identify that appointment on this date will be cancelled and patient should monitor her temperature and communicate with surgeon any symptoms or concerns.   Patient verbalizes understanding.     Patient will contact clinic to reschedule.     Reema Gaffney RN

## 2024-09-12 ENCOUNTER — TELEPHONE (OUTPATIENT)
Dept: PALLIATIVE MEDICINE | Facility: CLINIC | Age: 49
End: 2024-09-12

## 2024-09-12 DIAGNOSIS — G89.18 POST-OP PAIN: ICD-10-CM

## 2024-09-12 RX ORDER — OXYCODONE HYDROCHLORIDE 5 MG/1
5-10 TABLET ORAL EVERY 6 HOURS PRN
Qty: 15 TABLET | Refills: 0 | Status: CANCELLED | OUTPATIENT
Start: 2024-09-12

## 2024-09-12 NOTE — TELEPHONE ENCOUNTER
Prior Authorization Approval    Authorization Effective Date: 6/14/2024  Authorization Expiration Date: 3/12/2025  Medication: Oxycodone 5 mg - APPROVED  Approved Dose/Quantity:    Reference #:     Insurance Company: [employer] [place] - Phone [phone] Fax [phone]  Expected CoPay:       CoPay Card Available:      Foundation Assistance Needed:    Which Pharmacy is filling the prescription (Not needed for infusion/clinic administered): [facility] [address]   Pharmacy Notified:  YES  Patient Notified:  YES

## 2024-09-12 NOTE — TELEPHONE ENCOUNTER
Medication Requested:  linaclotide (LINZESS) 72 MCG capsule 30 capsule 1 5/14/2024 -- No   Sig - Route: Take 1 capsule (72 mcg) by mouth every morning (before breakfast) - Oral     ----------------------  Last Office Visit : 5/14/2024  [facility] Office visit:     9/13/2024 12:30 PM (30 min)  Ronald   Arrive by: 12:15 PM   RETURN GI   [facility] ([facility])   Madelaine Arredondo APRN CNP

## 2024-09-12 NOTE — TELEPHONE ENCOUNTER
Chart reviewed - She is s/p hernia repair surgery on 8/30/24, and postop pain medication should be managed by surgeon's team. Additionally, she is on Suboxone that is managed by a different provider and prescribe non-opioid medications only for her.     Jenny Landrum, DNP, APRN, AGNP-C  [facility]

## 2024-09-12 NOTE — TELEPHONE ENCOUNTER
Received call from patient requesting refill(s) oxyCODONE (ROXICODONE) 5 MG tablet     Last dispensed from pharmacy on 09/09/2024    Patient's last office/virtual visit by prescribing provider on 08/28/2024  Next office/virtual appointment scheduled for 09/20/2024    Last urine drug screen date 01/03/2023  Current opioid agreement on file (completed within the last year) No Date of opioid agreement: none    E-prescribe to   [facility] [address]       Will route to [facility] for review and preparation of prescription(s).     Peyton Gomez MA  [facility]

## 2024-09-12 NOTE — TELEPHONE ENCOUNTER
Retail Pharmacy Prior Authorization Team   Phone: [phone]    PA Initiation    Medication: Oxycodone 5 mg   Insurance Company: [facility] - Phone [phone] Fax [phone]  Pharmacy Filling the Rx: [address]   Filling Pharmacy Phone: [phone]  Filling Pharmacy Fax: [phone]  Start Date: 9/12/2024

## 2024-09-13 ENCOUNTER — APPOINTMENT (OUTPATIENT)
Dept: CT IMAGING | Facility: CLINIC | Age: 49
End: 2024-09-13
Attending: EMERGENCY MEDICINE
Payer: COMMERCIAL

## 2024-09-13 ENCOUNTER — TELEPHONE (OUTPATIENT)
Dept: SURGERY | Facility: CLINIC | Age: 49
End: 2024-09-13

## 2024-09-13 ENCOUNTER — HOSPITAL ENCOUNTER (EMERGENCY)
Facility: CLINIC | Age: 49
Discharge: HOME | End: 2024-09-13
Attending: EMERGENCY MEDICINE | Admitting: EMERGENCY MEDICINE
Payer: COMMERCIAL

## 2024-09-13 VITALS
BODY MASS INDEX: 28.32 KG/M2 | WEIGHT: 165 LBS | HEART RATE: 80 BPM | RESPIRATION RATE: 18 BRPM | DIASTOLIC BLOOD PRESSURE: 73 MMHG | SYSTOLIC BLOOD PRESSURE: 112 MMHG | OXYGEN SATURATION: 93 % | TEMPERATURE: 98.5 F

## 2024-09-13 DIAGNOSIS — G25.81 RESTLESS LEGS SYNDROME (RLS): ICD-10-CM

## 2024-09-13 DIAGNOSIS — R10.10 UPPER ABDOMINAL PAIN: ICD-10-CM

## 2024-09-13 DIAGNOSIS — R06.7 SNEEZING: ICD-10-CM

## 2024-09-13 DIAGNOSIS — G89.18 POST-OP PAIN: ICD-10-CM

## 2024-09-13 PROCEDURE — 250N000011 HC RX IP 250 OP 636: Performed by: EMERGENCY MEDICINE

## 2024-09-13 PROCEDURE — 96374 THER/PROPH/DIAG INJ IV PUSH: CPT | Mod: 59 | Performed by: EMERGENCY MEDICINE

## 2024-09-13 PROCEDURE — 96375 TX/PRO/DX INJ NEW DRUG ADDON: CPT | Performed by: EMERGENCY MEDICINE

## 2024-09-13 PROCEDURE — 250N000009 HC RX 250: Performed by: EMERGENCY MEDICINE

## 2024-09-13 PROCEDURE — 258N000003 HC RX IP 258 OP 636: Performed by: EMERGENCY MEDICINE

## 2024-09-13 PROCEDURE — 99285 EMERGENCY DEPT VISIT HI MDM: CPT | Mod: 25 | Performed by: EMERGENCY MEDICINE

## 2024-09-13 PROCEDURE — 96361 HYDRATE IV INFUSION ADD-ON: CPT | Performed by: EMERGENCY MEDICINE

## 2024-09-13 PROCEDURE — 250N000013 HC RX MED GY IP 250 OP 250 PS 637: Performed by: EMERGENCY MEDICINE

## 2024-09-13 PROCEDURE — 74177 CT ABD & PELVIS W/CONTRAST: CPT

## 2024-09-13 PROCEDURE — 99284 EMERGENCY DEPT VISIT MOD MDM: CPT | Performed by: EMERGENCY MEDICINE

## 2024-09-13 RX ORDER — OXYCODONE HYDROCHLORIDE 5 MG/1
5 TABLET ORAL ONCE
Status: COMPLETED | OUTPATIENT
Start: 2024-09-13 | End: 2024-09-13

## 2024-09-13 RX ORDER — LORAZEPAM 2 MG/ML
1 INJECTION INTRAMUSCULAR
Status: COMPLETED | OUTPATIENT
Start: 2024-09-13 | End: 2024-09-13

## 2024-09-13 RX ORDER — DIPHENHYDRAMINE HYDROCHLORIDE 50 MG/ML
50 INJECTION INTRAMUSCULAR; INTRAVENOUS ONCE
Status: COMPLETED | OUTPATIENT
Start: 2024-09-13 | End: 2024-09-13

## 2024-09-13 RX ORDER — IOPAMIDOL 755 MG/ML
500 INJECTION, SOLUTION INTRAVASCULAR ONCE
Status: COMPLETED | OUTPATIENT
Start: 2024-09-13 | End: 2024-09-13

## 2024-09-13 RX ORDER — METOCLOPRAMIDE HYDROCHLORIDE 5 MG/ML
10 INJECTION INTRAMUSCULAR; INTRAVENOUS ONCE
Status: COMPLETED | OUTPATIENT
Start: 2024-09-13 | End: 2024-09-13

## 2024-09-13 RX ORDER — ROPINIROLE 1 MG/1
TABLET, FILM COATED ORAL
Qty: 90 TABLET | Refills: 0 | Status: SHIPPED | OUTPATIENT
Start: 2024-09-13

## 2024-09-13 RX ADMIN — LORAZEPAM 1 MG: 2 INJECTION INTRAMUSCULAR; INTRAVENOUS at 12:02

## 2024-09-13 RX ADMIN — METOCLOPRAMIDE 10 MG: 5 INJECTION, SOLUTION INTRAMUSCULAR; INTRAVENOUS at 12:06

## 2024-09-13 RX ADMIN — IOPAMIDOL 80 ML: 755 INJECTION, SOLUTION INTRAVENOUS at 11:45

## 2024-09-13 RX ADMIN — SODIUM CHLORIDE 1000 ML: 9 INJECTION, SOLUTION INTRAVENOUS at 11:37

## 2024-09-13 RX ADMIN — SODIUM CHLORIDE 60 ML: 9 INJECTION, SOLUTION INTRAVENOUS at 11:46

## 2024-09-13 RX ADMIN — OXYCODONE HYDROCHLORIDE 5 MG: 5 TABLET ORAL at 13:15

## 2024-09-13 RX ADMIN — DIPHENHYDRAMINE HYDROCHLORIDE 50 MG: 50 INJECTION, SOLUTION INTRAMUSCULAR; INTRAVENOUS at 11:58

## 2024-09-13 ASSESSMENT — ACTIVITIES OF DAILY LIVING (ADL)
ADLS_ACUITY_SCORE: 36

## 2024-09-13 ASSESSMENT — COLUMBIA-SUICIDE SEVERITY RATING SCALE - C-SSRS
6. HAVE YOU EVER DONE ANYTHING, STARTED TO DO ANYTHING, OR PREPARED TO DO ANYTHING TO END YOUR LIFE?: NO
2. HAVE YOU ACTUALLY HAD ANY THOUGHTS OF KILLING YOURSELF IN THE PAST MONTH?: NO
1. IN THE PAST MONTH, HAVE YOU WISHED YOU WERE DEAD OR WISHED YOU COULD GO TO SLEEP AND NOT WAKE UP?: NO

## 2024-09-13 NOTE — TELEPHONE ENCOUNTER
Reason for Call:  Other call back    Detailed comments: Linette had a hernia repair with Dr Dozier on 8/30.     She says she was sneezing hard for about 5 minutes this morning. After that she noticed a bulge in her abdomen and is having a lot of pain in that area.     Linette would like to talk with a nurse as soon as possible. Thank you!     Phone Number Patient can be reached at: Cell number on file:    Telephone Information:   Mobile [phone]       Best Time: asap    Can we leave a detailed message on this number? YES    Call taken on 9/13/2024 at 9:18 AM by Keenan Holt

## 2024-09-13 NOTE — DISCHARGE INSTRUCTIONS
Your labs are normal.  Your CT scan shows normal postoperative changes, nothing acute or concerning.  This was also reviewed by your surgeon.    You do have a moderate amount of stool in your abdomen, continue using your MiraLAX and other bowel regimen.  You can increase the amount of MiraLAX he take each day until you have a great bowel movements.    Follow-up at your scheduled appointments.    Dr. Dozier has offered to refer you to a pain clinic which you can add to your current pain/psychology provider.    You could try a topical product such as lidocaine patches over the area of discomfort in the upper abdomen.    Return for fevers, other significant worrisome symptoms or concerns.    I hope you continue to heal quickly!!

## 2024-09-13 NOTE — TELEPHONE ENCOUNTER
Patient sneezed 50 times before 9am today. Afterwards, she noted a bulge to the location where hernia was repaired. Pain travels down the left side of the abdomen toward the incision. Nausea started after this happened and hasn't resolved yet with zofran. Placed ice pack on bulge with pressure, which increased pain. Pain 7/10 to abdomen described as pressure. The bulge is tender to the touch.    Advised patient to present to the Emergency Department within 2 hours for evaluation on this non-reducing bulge to her abdomen. Patient agrees.    Edith Wiley RN on 9/13/2024 at 9:52 AM

## 2024-09-13 NOTE — ED TRIAGE NOTES
"Patient had hernia surgery 2 week ago today, this morning sneezed multiple times and experienced \"excruciating pain\". Patient is diaphoretic and nauseous.      Triage Assessment (Adult)       Row Name 09/13/24 1050          Triage Assessment    Airway WDL WDL        Respiratory WDL    Respiratory WDL WDL        Skin Circulation/Temperature WDL    Skin Circulation/Temperature WDL WDL                     "

## 2024-09-13 NOTE — TELEPHONE ENCOUNTER
Prior Authorization Specialty Medication Request    Medication/Dose: Oxycodone 5mg  Diagnosis and ICD code (if different than what is on RX):    New/renewal/insurance change PA/secondary ins. PA:  Previously Tried and Failed:      Important Lab Values:   Rationale:     Insurance   Primary:   Insurance ID:      Secondary (if applicable):  Insurance ID:      Pharmacy Information (if different than what is on RX)  Name:    Phone:    Fax:

## 2024-09-14 NOTE — ED PROVIDER NOTES
"  History     Chief Complaint   Patient presents with    Post-op Problem     HPI  History per patient, medical records    This is a 48-year-old female, history of chronic pain, on Suboxone, GERD, hyperlipidemia, anxiety/depression, other history as below presenting with \"postop problem\".  Patient is status post laparoscopic hernia repair of fat-containing incisional ventral hernia after laparoscopic cholecystectomy on 8/30/2024.  Patient has been seen in this ED on 8/31 and 9/5/2024 for postoperative pain.  She has been receiving small doses of oxycodone refills but reportedly took her last dose 2 to 3 days ago.  She is using Suboxone but reports cutting her films in half and taking them every 12 hours.  She states that this does not help her pain and the Suboxone is not made to help her with her pain.  She also reports using medical marijuana but that this \"made her ADHD much worse\" so she will only take it at night.  Today she was sitting on the toilet trying to have a bowel movement and \"sneezed 50 times in a row\".  She noted increased pain in the upper abdomen radiating to the right upper quadrant along with some pain along the left abdomen.  She also feels that she has a \"lump\" under the incision site in the upper abdomen.  She has been icing it.  No fevers or chills.  She has been sweaty, nauseated.  She has been passing some stools and urinating.  She has chronic constipation from her chronic narcotic use.    Allergies:  Allergies   Allergen Reactions    Ergotamine-Caffeine      12-            GI problems-    Seasonal Allergies     Sumatriptan      vomits after giving herself a shot    Compazine Anxiety and Palpitations     Severe anxiety attack    Droperidol Anxiety and Palpitations     Severe anxiety attack    Nubain [Nalbuphine Hcl] Anxiety and Palpitations     Severe anxiety attack    Prochlorperazine Anxiety and Palpitations     Uncontrolled movement, severe anxiety attack       Problem List:  "   Patient Active Problem List    Diagnosis Date Noted    Cervicalgia 04/02/2024     Priority: Medium    Cervical radiculopathy 02/14/2024     Priority: Medium    Chronic bilateral thoracic back pain 02/14/2024     Priority: Medium    Lumbar radiculopathy 02/14/2024     Priority: Medium    Pain of both sacroiliac joints 02/14/2024     Priority: Medium    Radicular pain of upper extremity 02/14/2024     Priority: Medium    Somatic dysfunction of pelvic region 01/10/2024     Priority: Medium    Myofascial pain 01/10/2024     Priority: Medium    Generalized anxiety disorder 11/27/2023     Priority: Medium    Chronic low back pain with bilateral sciatica 06/15/2023     Priority: Medium    Chronic neck pain 06/15/2023     Priority: Medium    Moderate episode of recurrent major depressive disorder (H) 06/29/2022     Priority: Medium    Supraventricular tachycardia (H24) 06/03/2019     Priority: Medium    Psychophysiological insomnia 12/30/2017     Priority: Medium    Chronic bilateral low back pain without sciatica 12/30/2017     Priority: Medium    Opioid dependence in remission (H) 08/02/2015     Priority: Medium     On suboxone treatement with Bryant Harkins.  (Noted in 2015).        Anxiety attack 07/31/2015     Priority: Medium    Hypokalemia 06/23/2015     Priority: Medium    Tobacco abuse 06/23/2015     Priority: Medium    Hyperlipidemia LDL goal <100 01/29/2014     Priority: Medium    GERD (gastroesophageal reflux disease) 07/28/2010     Priority: Medium     Takes omeprazole and metoclopramide      Restless legs 07/28/2010     Priority: Medium        Past Medical History:    Past Medical History:   Diagnosis Date    Abdominal pain, right lower quadrant 03/09/2008    Anxiety attack 07/31/2015    Atypical chest pain 06/23/2015    De Quervain's disease (tenosynovitis)     Dehydration     Depressive disorder 1996    Gastric ulcer 07/31/2015    GERD (gastroesophageal reflux disease) 07/28/2010     Hypertension 2017    Ingrowing nail 01/09/2014    Migraines     Opioid dependence in remission (H) 08/02/2015    Other and unspecified ovarian cyst     Papanicolaou smear of cervix with low grade squamous intraepithelial lesion (LGSIL) 07/07/2017       Past Surgical History:    Past Surgical History:   Procedure Laterality Date    BIOPSY CERVICAL, LOCAL EXCISION, SINGLE/MULTIPLE N/A 8/10/2017    Procedure: BIOPSY CERVICAL, LOCAL EXCISION, SINGLE/MULTIPLE;;  Surgeon: Michael Chandler MD;  Location[facility]    COLONOSCOPY N/A 1/6/2023    Procedure: COLONOSCOPY;  Surgeon: Michael Dozier MD;  [facility]    COLPOSCOPY, BIOPSY, COMBINED N/A 8/10/2017    Procedure: COMBINED COLPOSCOPY, BIOPSY;  Colposcopy with Cervical Biopsies and Endometrial Biopsy, Exam with Ultrasound;  Surgeon: Michael Chandler MD;  [facility]    ESOPHAGOSCOPY, GASTROSCOPY, DUODENOSCOPY (EGD), COMBINED N/A 4/17/2017    Procedure: COMBINED ESOPHAGOSCOPY, GASTROSCOPY, DUODENOSCOPY (EGD);  Surgeon: Ibrahima Esposito MD;  [facility]    ESOPHAGOSCOPY, GASTROSCOPY, DUODENOSCOPY (EGD), COMBINED N/A 1/6/2023    Procedure: ESOPHAGOGASTRODUODENOSCOPY, WITH BIOPSY;  Surgeon: Michael Dozier MD;  [facility]    ESOPHAGOSCOPY, GASTROSCOPY, DUODENOSCOPY (EGD), COMBINED N/A 10/3/2023    Procedure: ESOPHAGOGASTRODUODENOSCOPY, WITH BIOPSY;  Surgeon: John Paul Varela MD;  [facility]    EXAM UNDER ANESTHESIA PELVIC N/A 8/10/2017    Procedure: EXAM UNDER ANESTHESIA PELVIC;;  Surgeon: Michael Chandler MD;  [facility]    HERNIORRHAPHY, INCISIONAL, ROBOT-ASSISTED, LAPAROSCOPIC, USING DA JERRELL XI N/A 8/30/2024    Procedure: HERNIORRHAPHY, INCISIONAL, ROBOT-ASSISTED, LAPAROSCOPIC, USING DA JERRELL XI;  Surgeon: Michael Dozier MD;  [facility]    HYSTERECTOMY      HYSTERECTOMY, PAP NO LONGER INDICATED      INJECT EPIDURAL CERVICAL N/A 10/20/2023    Procedure: Cervical 6-7 Interlaminar epidural steroid  injection using fluoroscopic guidance with contrast dye.;  Surgeon: Trevor Ivroy MD;  [facility]    INJECT EPIDURAL CERVICAL N/A 2024    Procedure: Cervical 6 - Cervical 7 Interlaminar epidural steroid injection using fluoroscopic guidance with contrast dye.;  Surgeon: Trevor Ivory MD;  [facility]    INJECT EPIDURAL LUMBAR Bilateral 2022    Procedure: Lumbar 5-Sacral 1 Transforaminal Epidural Steroid Injection with fluoroscopic guidance and contrast, bilateral;  Surgeon: Trevor Ivory MD;  [facility]    LAPAROSCOPIC CHOLECYSTECTOMY N/A 2018    Procedure: LAPAROSCOPIC CHOLECYSTECTOMY;  Laparoscopic Cholecystectomy;  Surgeon: Tigre Lowry DO;  [facility]    LAPAROSCOPIC HYSTERECTOMY TOTAL N/A 10/30/2017    Procedure: LAPAROSCOPIC HYSTERECTOMY TOTAL;  LAPAROSCOPIC HYSTERECTOMY TOTAL POSSIBLE SALPINGO-OOPHERECTOMY (BILATERAL);  Surgeon: Michael Chandler MD;  Location[facility]    ZZHC UGI ENDOSCOPY, SIMPLE EXAM  08       Family History:    Family History   Problem Relation Age of Onset    Depression Mother     Respiratory Mother     Chronic Obstructive Pulmonary Disease Mother     Hypertension Mother     Anxiety Disorder Mother     Cerebrovascular Disease Father         First stroke at age 28,  from 2nd when he was 55. Brain aneurysms.    Breast Cancer Cousin     Adrenal Disorder Other     Chronic Obstructive Pulmonary Disease Other     Asthma Brother        Social History:  Marital Status:  Single [1]  Social History     Tobacco Use    Smoking status: Every Day     Current packs/day: 0.25     Average packs/day: 0.3 packs/day for 20.0 years (5.0 ttl pk-yrs)     Types: Cigarettes     Passive exposure: Never    Smokeless tobacco: Never   Vaping Use    Vaping status: Former    Substances: Nicotine, CBD    Devices: [employer]com[employer] tank   Substance Use Topics    Alcohol use: Yes     Comment: Occasionaly    Drug use: No        Medications:    acetaminophen  "(TYLENOL) 500 MG tablet  ALPRAZolam (XANAX) 0.5 MG tablet  baclofen (LIORESAL) 20 MG tablet  bisacodyl (DULCOLAX) 5 MG EC tablet  buprenorphine HCl-naloxone HCl (SUBOXONE) 2-0.5 MG per film  busPIRone HCl (BUSPAR) 30 MG tablet  CONSTULOSE 10 GM/15ML solution  cyclobenzaprine (FLEXERIL) 5 MG tablet  DULoxetine (CYMBALTA) 60 MG capsule  famotidine (PEPCID) 40 MG tablet  linaclotide (LINZESS) 72 MCG capsule  medical cannabis (Patient's own supply)  ondansetron (ZOFRAN ODT) 4 MG ODT tab  pantoprazole (PROTONIX) 40 MG EC tablet  propranolol (INDERAL) 10 MG tablet  rOPINIRole (REQUIP) 1 MG tablet  simvastatin (ZOCOR) 10 MG tablet  temazepam (RESTORIL) 15 MG capsule          Review of Systems  All other ROS reviewed and are negative or non-contributory except as stated in HPI.     Physical Exam   BP: (!) 150/101  Pulse: 118  Temp: 98.5  F (36.9  C)  Resp: 18  Weight: 74.8 kg (165 lb)  SpO2: 98 %      Physical Exam  Vitals and nursing note reviewed.   Constitutional:       Appearance: Normal appearance. She is diaphoretic.      Comments: Patient is sitting on the bed.  Conversant.  Diaphoretic.  Anxious.   HENT:      Nose: Nose normal.      Mouth/Throat:      Mouth: Mucous membranes are moist.      Pharynx: Oropharynx is clear.   Eyes:      General: No scleral icterus.     Extraocular Movements: Extraocular movements intact.      Conjunctiva/sclera: Conjunctivae normal.   Cardiovascular:      Rate and Rhythm: Regular rhythm. Tachycardia present.      Pulses: Normal pulses.      Heart sounds: Normal heart sounds.   Pulmonary:      Effort: Pulmonary effort is normal.      Breath sounds: Normal breath sounds.   Abdominal:      General: Bowel sounds are normal.      Palpations: Abdomen is soft.      Tenderness: There is no rebound.      Comments: All incision sites are clean/dry/intact.  The upper mid abdominal incision site has what appears to be a small tender \"lump\" about quarter sized without fluctuance, significant " "induration, erythema.  No drainage.   Musculoskeletal:         General: Normal range of motion.      Cervical back: Normal range of motion and neck supple.      Right lower leg: No edema.      Left lower leg: No edema.   Skin:     General: Skin is warm.   Neurological:      General: No focal deficit present.      Mental Status: She is alert.   Psychiatric:         Behavior: Behavior normal.         ED Course (with Medical Decision Making)    Pt seen and examined by me.  RN and EPIC notes reviewed.       Patient with abdominal pain symptoms.  She is postoperative laparoscopic hernia repair.  She had increased pain after sneezing.  She is concerned that there is now a larger \"lump\".    I would guess that her pain would increase with increased intra-abdominal pressure from sneezing or coughing or certain movements.  She is somebody with chronic narcotic use and therefore has some hypnesthesia.  She has some tenderness to palpation but I do not think this is a large recurrent hernia.  Possible underlying seroma?    Of note, patient is tachycardic and diaphoretic.  I am wondering about narcotic withdrawal.  Patient states that she has not taken her Suboxone today.  In addition, she has been splitting her dose is reportedly.  I noted that I would not be able to get her pain under good control if she was not using her usual baseline medication.  Patient dug around in her purse and found some Suboxone.  She shows me a small film that she is cut.  I am not sure if this is a half or a quarter or what size film but I did have her take it while she was in the department and this was witnessed.    Patient was given some fluids, Reglan and Benadryl for nausea, Ativan for nausea and muscle spasm.  I did a CT scan.  This was reviewed by myself and Dr. Dozier.    CT showed postsurgical changes of the ventral hernia without evidence of recurrence.  She has a small amount of fluid just adjacent to the hernia repair that is actually " "mildly decreased in size from prior CT scan.  There is mild inflammatory stranding in the upper abdomen that is also decreased.  No abscess.    I had a long discussion with the patient about her pain and her pain control.  I recommend that she try using a lidocaine patch over the abdomen.  She can continue her Suboxone.  She can try over-the-counter medications.  I do not think that refills of further narcotic medications should be needed at this stage.  I was agreeable to give her 1 oxycodone prior to leaving the department.      CT scan did show moderate amount of stool and I recommend that she increase the amount of MiraLAX she takes daily.    Patient was discharged in stable condition.  Of note, patient returned to the department saying that she either misplaced or lost the 1 oxycodone she was given.  I am not going to give her any further narcotic medications.        Procedures    Results for orders placed or performed during the hospital encounter of 09/13/24 (from the past 24 hour(s))   CT ABDOMEN PELVIS W CONTRAST    Narrative    CT ABDOMEN PELVIS W CONTRAST 9/13/2024 11:57 AM    CLINICAL HISTORY: Postop laparoscopic hernia surgery 8/30/2024.   Patient sneezed multiple times, now with significant increased pain,  \"lump\" in upper abdomen.    TECHNIQUE: CT scan of the abdomen and pelvis was performed following  injection of IV contrast. Multiplanar reformats were obtained. Dose  reduction techniques were used.  CONTRAST: ISOVUE-370, 80mL    COMPARISON: CT abdomen and pelvis 9/5/2024.    FINDINGS:   LOWER CHEST: Subsegmental atelectasis of the lung bases.    HEPATOBILIARY: Cholecystectomy. No biliary ductal dilatation. No focal  liver lesion.    PANCREAS: No significant mass, duct dilatation, or inflammatory  change.    SPLEEN: Normal size. Few calcified splenic granulomas.    ADRENAL GLANDS: No significant nodules.    KIDNEYS/BLADDER: No significant mass, stones, or hydronephrosis.    BOWEL: No evidence of " bowel obstruction. Moderate colonic stool  burden. Normal caliber appendix. Duodenal diverticulum. There is mild  diffuse wall thickening and hyperenhancement of the distal ileum  including the terminal ileum.    Postsurgical changes of ventral hernia repair without evidence  recurrence. Small amount of fluid subjacent to the ventral hernia  repair is mildly increased from prior and is favored postsurgical in  etiology. Mild inflammatory stranding of the anterior upper abdomen is  slightly decreased. No intra-abdominal abscess.    PELVIC ORGANS: Hysterectomy. No pelvic mass.    ADDITIONAL FINDINGS: No adenopathy in the abdomen or pelvis. Mild  burden of atherosclerotic disease without abdominal aortic aneurysm.  No pneumoperitoneum.    MUSCULOSKELETAL: Slightly decreased size of a fluid and fat collection  of the subcutaneous upper anterior abdominal wall which now measures  1.6 x 2.0 cm compared to 1.9 x 2.5 cm, likely reflecting evolving fat  necrosis.      Impression    IMPRESSION:   1.  Postsurgical changes ventral hernia repair without evidence for  recurrence. Evolving postsurgical inflammation of the upper abdomen.  There is a region of evolving fat necrosis in the subcutaneous fat of  the anterior abdominal wall which could correspond to patient's  palpable abnormality.  2.  Mild inflammatory changes of the distal ileum including the  terminal ileum which may represent a nonspecific ileitis.    REY CHERY MD         SYSTEM ID:  [id number]     *Note: Due to a large number of results and/or encounters for the requested time period, some results have not been displayed. A complete set of results can be found in Results Review.       Medications   metoclopramide (REGLAN) injection 10 mg (10 mg Intravenous $Given 9/13/24 1206)   diphenhydrAMINE (BENADRYL) injection 50 mg (50 mg Intravenous $Given 9/13/24 1158)   LORazepam (ATIVAN) injection 1 mg (1 mg Intravenous $Given 9/13/24 1202)   sodium chloride 0.9%  BOLUS 1,000 mL (0 mLs Intravenous Stopped 9/13/24 1249)   iopamidol (ISOVUE-370) solution 500 mL (80 mLs Intravenous $Given 9/13/24 1145)   sodium chloride 0.9 % bag 100mL for CT scan flush use (60 mLs Intravenous $Given 9/13/24 1146)   oxyCODONE (ROXICODONE) tablet 5 mg (5 mg Oral $Given 9/13/24 1315)       Assessments & Plan      I have reviewed the findings, diagnosis, plan and need for follow up with the patient.    Discharge Medication List as of 9/13/2024  1:05 PM          Final diagnoses:   Upper abdominal pain   Sneezing   Post-op pain     Disposition: Patient discharged home in stable condition.  Plan as above.  Return for concerns.     Note: Chart documentation done in part with Dragon Voice Recognition software. Although reviewed after completion, some word and grammatical errors may remain.     9/13/2024   [facility] EMERGENCY DEPT       Janae Dhaliwal MD  09/14/24 0208

## 2024-09-16 ENCOUNTER — VIRTUAL VISIT (OUTPATIENT)
Dept: PSYCHOLOGY | Facility: CLINIC | Age: 49
End: 2024-09-16
Payer: COMMERCIAL

## 2024-09-16 DIAGNOSIS — F41.1 GENERALIZED ANXIETY DISORDER: ICD-10-CM

## 2024-09-16 DIAGNOSIS — F33.1 MODERATE EPISODE OF RECURRENT MAJOR DEPRESSIVE DISORDER (H): Primary | ICD-10-CM

## 2024-09-16 PROCEDURE — 90837 PSYTX W PT 60 MINUTES: CPT | Mod: 95 | Performed by: COUNSELOR

## 2024-09-16 ASSESSMENT — ANXIETY QUESTIONNAIRES
GAD7 TOTAL SCORE: 20
8. IF YOU CHECKED OFF ANY PROBLEMS, HOW DIFFICULT HAVE THESE MADE IT FOR YOU TO DO YOUR WORK, TAKE CARE OF THINGS AT HOME, OR GET ALONG WITH OTHER PEOPLE?: EXTREMELY DIFFICULT
8. IF YOU CHECKED OFF ANY PROBLEMS, HOW DIFFICULT HAVE THESE MADE IT FOR YOU TO DO YOUR WORK, TAKE CARE OF THINGS AT HOME, OR GET ALONG WITH OTHER PEOPLE?: EXTREMELY DIFFICULT
GAD7 TOTAL SCORE: 20
7. FEELING AFRAID AS IF SOMETHING AWFUL MIGHT HAPPEN: NEARLY EVERY DAY
7. FEELING AFRAID AS IF SOMETHING AWFUL MIGHT HAPPEN: NEARLY EVERY DAY
GAD7 TOTAL SCORE: 20

## 2024-09-16 NOTE — PROGRESS NOTES
Answers submitted by the patient for this visit:  Patient Health Questionnaire (Submitted on 9/16/2024)  If you checked off any problems, how difficult have these problems made it for you to do your work, take care of things at home, or get along with other people?: Extremely difficult  PHQ9 TOTAL SCORE: 22  Patient Health Questionnaire (G7) (Submitted on 9/16/2024)  BO 7 TOTAL SCORE: 20        [facility]                                     Progress Note    Patient Name: Radha Beckwith  Date: 9/16/24         Service Type: Individual      Session Start Time: 3:30pm Session End Time: 4:30pm     Session Length:  60    Session #: 51    Attendees: Client    Service Modality:  Video      Provider verified identity through the following two step process.  Patient provided:  Patient is known previously to provider    Telemedicine Visit: The patient's condition can be safely assessed and treated via synchronous audio and visual telemedicine encounter.      Reason for Telemedicine Visit: Patient convenience (e.g. access to timely appointments / distance to available provider)    Originating Site (Patient Location): Patient's home    Distant Site (Provider Location): Provider Remote Setting- Home Office    Consent:  The patient/guardian has verbally consented to: the potential risks and benefits of telemedicine (video visit) versus in person care; bill my insurance or make self-payment for services provided; and responsibility for payment of non-covered services.     Patient would like the video invitation sent by:  My Chart    Mode of Communication:  video    Distant Location (Provider):  On-site    As the provider I attest to compliance with applicable laws and regulations related to telemedicine.    DATA  Interactive Complexity: No  Crisis: No        Progress Since Last Session (Related to Symptoms / Goals / Homework):   Symptoms: No change .    Homework: Completed in session      Episode of Care  Goals: Satisfactory progress - MAINTENANCE (Working to maintain change, with risk of relapse); Intervened by continuing to positively reinforce healthy behavior choice      Current / Ongoing Stressors and Concerns:   The client stated she finally felt good on Saturday to be able to do some chores.    Stated son had surgery recently.   Has to do a whole function report for disability.   A friend from high school recently passed away.   Last Friday was the anniversary of her mom's death (5 years) and she had to be in the ER for abdominal pain around the area where she has surgery.      Treatment Objective(s) Addressed in This Session:   Increase interest, engagement, and pleasure in doing things  Feel less tired and more energy during the day        Intervention:   Talked about her having to go to the ER again. She stated her son had surgery and his girlfriend took care of him afterwards. This was hard for her because when she had her surgery her partner didn't help her. Discussed grief themes with that and around her mom's anniversary.     Assessments completed prior to visit:  The following assessments were completed by patient for this visit:  PHQ9:       7/11/2024     9:40 AM 7/25/2024    11:51 AM 8/4/2024     7:19 PM 8/13/2024     5:34 PM 8/18/2024    11:34 AM 8/28/2024     2:05 PM 9/16/2024     3:26 PM   PHQ-9 SCORE   PHQ-9 Total Score MyChart 22 (Severe depression) 21 (Severe depression) 21 (Severe depression) 23 (Severe depression) 22 (Severe depression) 22 (Severe depression) 22 (Severe depression)   PHQ-9 Total Score 22 21 21 23 22 22 22     GAD7:       5/23/2024     5:47 AM 6/12/2024     4:08 PM 7/1/2024     2:51 PM 7/25/2024    11:52 AM 8/13/2024     5:35 PM 8/28/2024     2:06 PM 9/16/2024     3:27 PM   BO-7 SCORE   Total Score 21 (severe anxiety) 21 (severe anxiety) 19 (severe anxiety) 21 (severe anxiety) 19 (severe anxiety) 21 (severe anxiety) 20 (severe anxiety)   Total Score 21 21 19 21 19    19 21 20     20     PROMIS 10-Global Health (all questions and answers displayed):       4/10/2024     1:58 PM 4/17/2024     1:28 PM 7/25/2024    11:56 AM 8/4/2024     7:20 PM 8/10/2024     2:51 PM 8/18/2024    11:36 AM 8/28/2024     2:07 PM   PROMIS 10   In general, would you say your health is: Poor Poor Poor Poor Fair Poor Poor   In general, would you say your quality of life is: Poor Poor Fair Poor Poor Fair Fair   In general, how would you rate your physical health? Poor Poor Poor Poor Poor Poor Fair   In general, how would you rate your mental health, including your mood and your ability to think? Poor Poor Poor Poor Poor Poor Poor   In general, how would you rate your satisfaction with your social activities and relationships? Poor Poor Poor Fair Poor Fair Fair   In general, please rate how well you carry out your usual social activities and roles Fair Poor Poor Poor Poor Fair Fair   To what extent are you able to carry out your everyday physical activities such as walking, climbing stairs, carrying groceries, or moving a chair? A little A little A little Moderately A little A little A little   In the past 7 days, how often have you been bothered by emotional problems such as feeling anxious, depressed, or irritable? Always Always Always Always Always Always Always   In the past 7 days, how would you rate your fatigue on average? Severe Very severe Severe Severe Very severe Very severe Severe   In the past 7 days, how would you rate your pain on average, where 0 means no pain, and 10 means worst imaginable pain? 7 7 7 7 7 7 7   In general, would you say your health is: 1 1 1 1 2 1 1   In general, would you say your quality of life is: 1 1 2 1 1 2 2   In general, how would you rate your physical health? 1 1 1 1 1 1 2   In general, how would you rate your mental health, including your mood and your ability to think? 1 1 1 1 1 1 1   In general, how would you rate your satisfaction with your social activities and  relationships? 1 1 1 2 1 2 2   In general, please rate how well you carry out your usual social activities and roles. (This includes activities at home, at work and in your community, and responsibilities as a parent, child, spouse, employee, friend, etc.) 2 1 1 1 1 2 2   To what extent are you able to carry out your everyday physical activities such as walking, climbing stairs, carrying groceries, or moving a chair? 2 2 2 3 2 2 2   In the past 7 days, how often have you been bothered by emotional problems such as feeling anxious, depressed, or irritable? 5 5 5 5 5 5 5   In the past 7 days, how would you rate your fatigue on average? 4 5 4 4 5 5 4   In the past 7 days, how would you rate your pain on average, where 0 means no pain, and 10 means worst imaginable pain? 7 7 7 7 7 7 7   Global Mental Health Score 4    4 4    4 5    5 5 4    4 6 6   Global Physical Health Score 7    7 6    6 7    7 8 6    6 6 8   PROMIS TOTAL - SUBSCORES 11    11 10    10 12    12 13 10    10 12 14     [place] Suicide Severity Rating Scale (Lifetime/Recent)      10/4/2022    11:00 AM 5/13/2024     3:13 PM 8/21/2024    10:30 AM 8/30/2024     8:20 AM 8/31/2024    12:07 PM 9/5/2024     5:33 PM 9/13/2024    10:52 AM   [place] Suicide Severity Rating (Lifetime/Recent)   Q1 Wished to be Dead (Past Month) no 0-->no 0-->no 0-->no 0-->no 0-->no 0-->no   Q2 Suicidal Thoughts (Past Month) no 0-->no 0-->no 0-->no 0-->no 0-->no 0-->no   Q3 Suicidal Thought Method no         Q4 Suicidal Intent without Specific Plan no         Q5 Suicide Intent with Specific Plan yes         Q6 Suicide Behavior (Lifetime) yes 0-->no 0-->no 0-->no 0-->no 1-->yes 0-->no   If yes to Q6, within past 3 months? no     0-->no    Level of Risk per Screen high risk no risks indicated no risks indicated no risks indicated no risks indicated moderate risk no risks indicated         ASSESSMENT: Current Emotional / Mental Status (status of significant symptoms):   Risk status  (Self / Other harm or suicidal ideation)   Patient denies current fears or concerns for personal safety.   Patient denies current or recent suicidal ideation or behaviors.   Patient denies current or recent homicidal ideation or behaviors.   Patient denies current or recent self injurious behavior or ideation.   Patient denies other safety concerns.   Patient reports there has been no change in risk factors since their last session.     Patient reports there has been no change in protective factors since their last session.     Recommended that patient call 911 or go to the local ED should there be a change in any of these risk factors.     Appearance:   appropriate    Eye Contact:   good    Psychomotor Behavior: Normal    Attitude:   Cooperative    Orientation:   All   Speech    Rate / Production: Normal/ Responsive Normal     Volume:  Normal    Mood:    Normal   Affect:    Appropriate  Tearful   Thought Content:  Clear    Thought Form:  Coherent    Insight:    Good      Medication Review:   No changes to current psychiatric medication(s)     Medication Compliance:   Yes     Changes in Health Issues:   None reported     Chemical Use Review:   Substance Use: Chemical use reviewed, no active concerns identified      Tobacco Use: No change in amount of tobacco use since last session.  Patient declined discussion at this time    Diagnosis:  1. Moderate episode of recurrent major depressive disorder (H)    2. Generalized anxiety disorder            Collateral Reports Completed:   Not Applicable    PLAN: (Patient Tasks / Therapist Tasks / Other)  Continue to continue to work on self care and communication skills.         Ricarda Bhatia [facility]                                                         ______________________________________________________________________    Individual Treatment Plan    Patient's Name: Radha Beckwith  YOB: 1975    Date of Creation: 6/28/23  Date Treatment Plan Last  Reviewed/Revised: 8/14/24    DSM5 Diagnoses: 296.32 (F33.1) Major Depressive Disorder, Recurrent Episode, Moderate _  Psychosocial / Contextual Factors: living with boyfriend, memory issues  PROMIS (reviewed every 90 days):     Referral / Collaboration:  Referral to another professional/service is not indicated at this time..    Anticipated number of session for this episode of care: 9-12 sessions  Anticipation frequency of session:  as needed  Anticipated Duration of each session: 38-52 minutes  Treatment plan will be reviewed in 90 days or when goals have been changed.       MeasurableTreatment Goal(s) related to diagnosis / functional impairment(s)  Goal 1: Patient will increase communication skills with family members.    Objective #A (Patient Action)    Patient will learn & utilize at least 2 assertive communication skills weekly.  Status: Continued - Date(s): 8/14/24    Intervention(s)  Therapist will teach emotional regulation skills. distress tolerance skills, interpersonal effectiveness skills, emotion regluation skills, mindfulness skills, radical acceptance. Therapist will teach client how to ID body cues for anxiety, anxiety reduction techniques, how to ID triggers for depression and anxiety- decrease reactivity/eliminate, lifestyle changes to reduce depression and anxiety, communication skills, explore cognitive beliefs and help client to develop healthy cognitive patterns and beliefs.    Objective #B  Patient will use thought-stopping strategy daily to reduce intrusive thoughts.  Status: Continued - Date(s): 8/14/24    Intervention(s)  Therapist will teach emotional regulation skills. distress tolerance skills, interpersonal effectiveness skills, emotion regluation skills, mindfulness skills, radical acceptance. Therapist will teach client how to ID body cues for anxiety, anxiety reduction techniques, how to ID triggers for depression and anxiety- decrease reactivity/eliminate, lifestyle changes to  reduce depression and anxiety, communication skills, explore cognitive beliefs and help client to develop healthy cognitive patterns and beliefs.      Goal 2: Patient will decrease depression symptoms.      Objective #A (Patient Action)    Status: Continued - Date(s): 8/14/24    Patient will Increase interest, engagement, and pleasure in doing things  Decrease frequency and intensity of feeling down, depressed, hopeless  Improve quantity and quality of night time sleep / decrease daytime naps  Feel less tired and more energy during the day   Improve diet, appetite, mindful eating, and / or meal planning  Identify negative self-talk and behaviors: challenge core beliefs, myths, and actions  Improve concentration, focus, and mindfulness in daily activities   Feel less fidgety, restless or slow in daily activities / interpersonal interactions.    Intervention(s)  Therapist will teach emotional regulation skills. distress tolerance skills, interpersonal effectiveness skills, emotion regluation skills, mindfulness skills, radical acceptance. Therapist will teach client how to ID body cues for anxiety, anxiety reduction techniques, how to ID triggers for depression and anxiety- decrease reactivity/eliminate, lifestyle changes to reduce depression and anxiety, communication skills, explore cognitive beliefs and help client to develop healthy cognitive patterns and beliefs.    Objective #B  Patient will identify three distraction and diversion activities and use those activities to decrease level of anxiety  .    Status: Continued - Date(s):  8/14/24    Intervention(s)  Therapist will teach emotional regulation skills. distress tolerance skills, interpersonal effectiveness skills, emotion regluation skills, mindfulness skills, radical acceptance. Therapist will teach client how to ID body cues for anxiety, anxiety reduction techniques, how to ID triggers for depression and anxiety- decrease reactivity/eliminate, lifestyle  changes to reduce depression and anxiety, communication skills, explore cognitive beliefs and help client to develop healthy cognitive patterns and beliefs.      Patient has reviewed and agreed to the above plan.      Ricarda Bhatia [facility]

## 2024-09-23 ENCOUNTER — VIRTUAL VISIT (OUTPATIENT)
Dept: PSYCHOLOGY | Facility: CLINIC | Age: 49
End: 2024-09-23
Payer: COMMERCIAL

## 2024-09-23 DIAGNOSIS — F41.1 GENERALIZED ANXIETY DISORDER: ICD-10-CM

## 2024-09-23 DIAGNOSIS — F33.1 MODERATE EPISODE OF RECURRENT MAJOR DEPRESSIVE DISORDER (H): Primary | ICD-10-CM

## 2024-09-23 PROCEDURE — 90837 PSYTX W PT 60 MINUTES: CPT | Mod: 95 | Performed by: COUNSELOR

## 2024-09-23 ASSESSMENT — PATIENT HEALTH QUESTIONNAIRE - PHQ9
SUM OF ALL RESPONSES TO PHQ QUESTIONS 1-9: 25
SUM OF ALL RESPONSES TO PHQ QUESTIONS 1-9: 25
10. IF YOU CHECKED OFF ANY PROBLEMS, HOW DIFFICULT HAVE THESE PROBLEMS MADE IT FOR YOU TO DO YOUR WORK, TAKE CARE OF THINGS AT HOME, OR GET ALONG WITH OTHER PEOPLE: EXTREMELY DIFFICULT

## 2024-09-23 NOTE — PROGRESS NOTES
Answers submitted by the patient for this visit:  Patient Health Questionnaire (Submitted on 9/23/2024)  If you checked off any problems, how difficult have these problems made it for you to do your work, take care of things at home, or get along with other people?: Extremely difficult  PHQ9 TOTAL SCORE: 25  Patient Health Questionnaire (G7) (Submitted on 9/16/2024)  BO 7 TOTAL SCORE: 20        [facility]                                     Progress Note    Patient Name: Radha Beckwith  Date: 9/23/24         Service Type: Individual      Session Start Time: 3:30pm Session End Time: 4:30pm     Session Length:  60    Session #: 52    Attendees: Client    Service Modality:  Video      Provider verified identity through the following two step process.  Patient provided:  Patient is known previously to provider    Telemedicine Visit: The patient's condition can be safely assessed and treated via synchronous audio and visual telemedicine encounter.      Reason for Telemedicine Visit: Patient convenience (e.g. access to timely appointments / distance to available provider)    Originating Site (Patient Location): Patient's home    Distant Site (Provider Location): Provider Remote Setting- Home Office    Consent:  The patient/guardian has verbally consented to: the potential risks and benefits of telemedicine (video visit) versus in person care; bill my insurance or make self-payment for services provided; and responsibility for payment of non-covered services.     Patient would like the video invitation sent by:  My Chart    Mode of Communication:  video    Distant Location (Provider):  On-site    As the provider I attest to compliance with applicable laws and regulations related to telemedicine.    DATA  Interactive Complexity: No  Crisis: No        Progress Since Last Session (Related to Symptoms / Goals / Homework):   Symptoms: No change .    Homework: Completed in session      Episode of Care  "Goals: Satisfactory progress - MAINTENANCE (Working to maintain change, with risk of relapse); Intervened by continuing to positively reinforce healthy behavior choice      Current / Ongoing Stressors and Concerns:   The client stated she doesn't actually have suicidal thoughts it's more of \"I don't care if I die.\"   Feels like the hernia is back.   Things with her boyfriend haven't changed.   Feels sick of being in pain, of having a bad relationship, not having enough money.   Had her niece's shower over the weekend and it went well.      Treatment Objective(s) Addressed in This Session:   Increase interest, engagement, and pleasure in doing things  Feel less tired and more energy during the day        Intervention:   Talked about her apathy about living due to the chronic pain, money issues, and relationship issues. Continued to process her feelings about her relationship. Client talked about how she really struggles to focus to be able to get done everything on her list she has to do for disability services and such. Continued to work on mindfulness and distress tolerance.      Assessments completed prior to visit:  The following assessments were completed by patient for this visit:  PHQ9:       7/25/2024    11:51 AM 8/4/2024     7:19 PM 8/13/2024     5:34 PM 8/18/2024    11:34 AM 8/28/2024     2:05 PM 9/16/2024     3:26 PM 9/23/2024    11:46 AM   PHQ-9 SCORE   PHQ-9 Total Score MyChart 21 (Severe depression) 21 (Severe depression) 23 (Severe depression) 22 (Severe depression) 22 (Severe depression) 22 (Severe depression) 25 (Severe depression)   PHQ-9 Total Score 21 21 23 22 22 22 25     GAD7:       5/23/2024     5:47 AM 6/12/2024     4:08 PM 7/1/2024     2:51 PM 7/25/2024    11:52 AM 8/13/2024     5:35 PM 8/28/2024     2:06 PM 9/16/2024     3:27 PM   BO-7 SCORE   Total Score 21 (severe anxiety) 21 (severe anxiety) 19 (severe anxiety) 21 (severe anxiety) 19 (severe anxiety) 21 (severe anxiety) 20 (severe " anxiety)   Total Score 21 21 19 21 19    19 21 20    20     PROMIS 10-Global Health (all questions and answers displayed):       4/10/2024     1:58 PM 4/17/2024     1:28 PM 7/25/2024    11:56 AM 8/4/2024     7:20 PM 8/10/2024     2:51 PM 8/18/2024    11:36 AM 8/28/2024     2:07 PM   PROMIS 10   In general, would you say your health is: Poor Poor Poor Poor Fair Poor Poor   In general, would you say your quality of life is: Poor Poor Fair Poor Poor Fair Fair   In general, how would you rate your physical health? Poor Poor Poor Poor Poor Poor Fair   In general, how would you rate your mental health, including your mood and your ability to think? Poor Poor Poor Poor Poor Poor Poor   In general, how would you rate your satisfaction with your social activities and relationships? Poor Poor Poor Fair Poor Fair Fair   In general, please rate how well you carry out your usual social activities and roles Fair Poor Poor Poor Poor Fair Fair   To what extent are you able to carry out your everyday physical activities such as walking, climbing stairs, carrying groceries, or moving a chair? A little A little A little Moderately A little A little A little   In the past 7 days, how often have you been bothered by emotional problems such as feeling anxious, depressed, or irritable? Always Always Always Always Always Always Always   In the past 7 days, how would you rate your fatigue on average? Severe Very severe Severe Severe Very severe Very severe Severe   In the past 7 days, how would you rate your pain on average, where 0 means no pain, and 10 means worst imaginable pain? 7 7 7 7 7 7 7   In general, would you say your health is: 1 1 1 1 2 1 1   In general, would you say your quality of life is: 1 1 2 1 1 2 2   In general, how would you rate your physical health? 1 1 1 1 1 1 2   In general, how would you rate your mental health, including your mood and your ability to think? 1 1 1 1 1 1 1   In general, how would you rate your  satisfaction with your social activities and relationships? 1 1 1 2 1 2 2   In general, please rate how well you carry out your usual social activities and roles. (This includes activities at home, at work and in your community, and responsibilities as a parent, child, spouse, employee, friend, etc.) 2 1 1 1 1 2 2   To what extent are you able to carry out your everyday physical activities such as walking, climbing stairs, carrying groceries, or moving a chair? 2 2 2 3 2 2 2   In the past 7 days, how often have you been bothered by emotional problems such as feeling anxious, depressed, or irritable? 5 5 5 5 5 5 5   In the past 7 days, how would you rate your fatigue on average? 4 5 4 4 5 5 4   In the past 7 days, how would you rate your pain on average, where 0 means no pain, and 10 means worst imaginable pain? 7 7 7 7 7 7 7   Global Mental Health Score 4    4 4    4 5    5 5 4    4 6 6   Global Physical Health Score 7    7 6    6 7    7 8 6    6 6 8   PROMIS TOTAL - SUBSCORES 11    11 10    10 12    12 13 10    10 12 14     [place] Suicide Severity Rating Scale (Lifetime/Recent)      10/4/2022    11:00 AM 5/13/2024     3:13 PM 8/21/2024    10:30 AM 8/30/2024     8:20 AM 8/31/2024    12:07 PM 9/5/2024     5:33 PM 9/13/2024    10:52 AM   [place] Suicide Severity Rating (Lifetime/Recent)   Q1 Wished to be Dead (Past Month) no 0-->no 0-->no 0-->no 0-->no 0-->no 0-->no   Q2 Suicidal Thoughts (Past Month) no 0-->no 0-->no 0-->no 0-->no 0-->no 0-->no   Q3 Suicidal Thought Method no         Q4 Suicidal Intent without Specific Plan no         Q5 Suicide Intent with Specific Plan yes         Q6 Suicide Behavior (Lifetime) yes 0-->no 0-->no 0-->no 0-->no 1-->yes 0-->no   If yes to Q6, within past 3 months? no     0-->no    Level of Risk per Screen high risk no risks indicated no risks indicated no risks indicated no risks indicated moderate risk no risks indicated         ASSESSMENT: Current Emotional / Mental Status  (status of significant symptoms):   Risk status (Self / Other harm or suicidal ideation)   Patient denies current fears or concerns for personal safety.   Patient denies current or recent suicidal ideation or behaviors.   Patient denies current or recent homicidal ideation or behaviors.   Patient denies current or recent self injurious behavior or ideation.   Patient denies other safety concerns.   Patient reports there has been no change in risk factors since their last session.     Patient reports there has been no change in protective factors since their last session.     Recommended that patient call 911 or go to the local ED should there be a change in any of these risk factors.     Appearance:   appropriate    Eye Contact:   good    Psychomotor Behavior: Normal    Attitude:   Cooperative    Orientation:   All   Speech    Rate / Production: Normal/ Responsive Normal     Volume:  Normal    Mood:    Normal   Affect:    Appropriate  Tearful   Thought Content:  Clear    Thought Form:  Coherent    Insight:    Good      Medication Review:   No changes to current psychiatric medication(s)     Medication Compliance:   Yes     Changes in Health Issues:   None reported     Chemical Use Review:   Substance Use: Chemical use reviewed, no active concerns identified      Tobacco Use: No change in amount of tobacco use since last session.  Patient declined discussion at this time    Diagnosis:  1. Moderate episode of recurrent major depressive disorder (H)    2. Generalized anxiety disorder            Collateral Reports Completed:   Not Applicable    PLAN: (Patient Tasks / Therapist Tasks / Other)  Continue to continue to work on self care and communication skills and distress tolerance skills.         Ricarda Bhatia, [facility]                                                         ______________________________________________________________________    Individual Treatment Plan    Patient's Name: Radha Beckwith  Date Of  Birth: 1975    Date of Creation: 6/28/23  Date Treatment Plan Last Reviewed/Revised: 8/14/24    DSM5 Diagnoses: 296.32 (F33.1) Major Depressive Disorder, Recurrent Episode, Moderate _  Psychosocial / Contextual Factors: living with boyfriend, memory issues  PROMIS (reviewed every 90 days):     Referral / Collaboration:  Referral to another professional/service is not indicated at this time..    Anticipated number of session for this episode of care: 9-12 sessions  Anticipation frequency of session:  as needed  Anticipated Duration of each session: 38-52 minutes  Treatment plan will be reviewed in 90 days or when goals have been changed.       MeasurableTreatment Goal(s) related to diagnosis / functional impairment(s)  Goal 1: Patient will increase communication skills with family members.    Objective #A (Patient Action)    Patient will learn & utilize at least 2 assertive communication skills weekly.  Status: Continued - Date(s): 8/14/24    Intervention(s)  Therapist will teach emotional regulation skills. distress tolerance skills, interpersonal effectiveness skills, emotion regluation skills, mindfulness skills, radical acceptance. Therapist will teach client how to ID body cues for anxiety, anxiety reduction techniques, how to ID triggers for depression and anxiety- decrease reactivity/eliminate, lifestyle changes to reduce depression and anxiety, communication skills, explore cognitive beliefs and help client to develop healthy cognitive patterns and beliefs.    Objective #B  Patient will use thought-stopping strategy daily to reduce intrusive thoughts.  Status: Continued - Date(s): 8/14/24    Intervention(s)  Therapist will teach emotional regulation skills. distress tolerance skills, interpersonal effectiveness skills, emotion regluation skills, mindfulness skills, radical acceptance. Therapist will teach client how to ID body cues for anxiety, anxiety reduction techniques, how to ID triggers for  depression and anxiety- decrease reactivity/eliminate, lifestyle changes to reduce depression and anxiety, communication skills, explore cognitive beliefs and help client to develop healthy cognitive patterns and beliefs.      Goal 2: Patient will decrease depression symptoms.      Objective #A (Patient Action)    Status: Continued - Date(s): 8/14/24    Patient will Increase interest, engagement, and pleasure in doing things  Decrease frequency and intensity of feeling down, depressed, hopeless  Improve quantity and quality of night time sleep / decrease daytime naps  Feel less tired and more energy during the day   Improve diet, appetite, mindful eating, and / or meal planning  Identify negative self-talk and behaviors: challenge core beliefs, myths, and actions  Improve concentration, focus, and mindfulness in daily activities   Feel less fidgety, restless or slow in daily activities / interpersonal interactions.    Intervention(s)  Therapist will teach emotional regulation skills. distress tolerance skills, interpersonal effectiveness skills, emotion regluation skills, mindfulness skills, radical acceptance. Therapist will teach client how to ID body cues for anxiety, anxiety reduction techniques, how to ID triggers for depression and anxiety- decrease reactivity/eliminate, lifestyle changes to reduce depression and anxiety, communication skills, explore cognitive beliefs and help client to develop healthy cognitive patterns and beliefs.    Objective #B  Patient will identify three distraction and diversion activities and use those activities to decrease level of anxiety  .    Status: Continued - Date(s):  8/14/24    Intervention(s)  Therapist will teach emotional regulation skills. distress tolerance skills, interpersonal effectiveness skills, emotion regluation skills, mindfulness skills, radical acceptance. Therapist will teach client how to ID body cues for anxiety, anxiety reduction techniques, how to ID  triggers for depression and anxiety- decrease reactivity/eliminate, lifestyle changes to reduce depression and anxiety, communication skills, explore cognitive beliefs and help client to develop healthy cognitive patterns and beliefs.      Patient has reviewed and agreed to the above plan.      Ricarda Bhatia [facility]

## 2024-09-24 ENCOUNTER — VIRTUAL VISIT (OUTPATIENT)
Dept: PALLIATIVE MEDICINE | Facility: CLINIC | Age: 49
End: 2024-09-24
Payer: COMMERCIAL

## 2024-09-24 DIAGNOSIS — M54.42 CHRONIC BILATERAL LOW BACK PAIN WITH BILATERAL SCIATICA: ICD-10-CM

## 2024-09-24 DIAGNOSIS — M54.2 CHRONIC NECK PAIN: ICD-10-CM

## 2024-09-24 DIAGNOSIS — G89.29 CHRONIC BILATERAL THORACIC BACK PAIN: ICD-10-CM

## 2024-09-24 DIAGNOSIS — G89.29 CENTRAL SENSITIZATION TO PAIN: ICD-10-CM

## 2024-09-24 DIAGNOSIS — M79.18 MYOFASCIAL PAIN: ICD-10-CM

## 2024-09-24 DIAGNOSIS — M54.6 CHRONIC BILATERAL THORACIC BACK PAIN: ICD-10-CM

## 2024-09-24 DIAGNOSIS — G89.29 CHRONIC BILATERAL LOW BACK PAIN WITH BILATERAL SCIATICA: ICD-10-CM

## 2024-09-24 DIAGNOSIS — M47.812 SPONDYLOSIS OF CERVICAL REGION WITHOUT MYELOPATHY OR RADICULOPATHY: ICD-10-CM

## 2024-09-24 DIAGNOSIS — M54.41 CHRONIC BILATERAL LOW BACK PAIN WITH BILATERAL SCIATICA: ICD-10-CM

## 2024-09-24 DIAGNOSIS — G89.29 CHRONIC NECK PAIN: ICD-10-CM

## 2024-09-24 DIAGNOSIS — M47.812 ARTHROPATHY OF CERVICAL FACET JOINT: Primary | ICD-10-CM

## 2024-09-24 PROCEDURE — 96158 HLTH BHV IVNTJ INDIV 1ST 30: CPT | Mod: 95 | Performed by: PSYCHOLOGIST

## 2024-09-24 PROCEDURE — 96159 HLTH BHV IVNTJ INDIV EA ADDL: CPT | Mod: 95 | Performed by: PSYCHOLOGIST

## 2024-09-24 NOTE — PROGRESS NOTES
Radha Beckwith is a 48 year old female who is being evaluated via a billable video visit.      Patient is currently in the [facility]? 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 Your mood is worse - feeling overwhelmed with appointments, also frustrated by increased pain and inability to obtain additional pain medications from several providers you've spoken to. Your activity level is reduced - continuing to work on paying attention to cues regarding pain and fatigue. Your stress level is high - pain, relationship. Your sleep is about the same, continue to wake up in pain. You reported engaging in self-care for your pain 1-3 times daily.    You identified that you would like to focus on the following or had questions regarding the following issues or concerns, and we discussed the following:   - post-operative pain - 'its been awful, absolutely awful'  - discussed passive suicidal ideation triggered by pain with individual therapist yesterday at visit - report absence of plans or intent  - feeling overwhelmed by number of appointments - discussed what specifically you'd like to focus on today and at our last visit coming up  - SSDI 'screwed it up, now I have to go through a hearing' - yet to be scheduled  - report incision is healing, 'there is a lump that  gets bigger..shooting pain'  - partner has not been supportive in cares - does not believe your pain levels, does not understand Spoon Theory  - working on pacing, 'knowing when to stop' activity  - discussed other strategies to use in addition to pain medications - new neck pillow, ice pack, started [employer] [place]  - thinking about housing options - working on with individual therapist  - temperatures have improved and been more stable, still smoking but no longer using nicotine patch    ASSESSMENT: Linette reports ongoing post-operative pain which is likely complicated by her central sensitization to pain as well as a tendency to at times over-engage in activity. She is well-established with her individual therapist, and is recommended to continue to work with her to address mental health and ongoing stress related to living with chronic pain.     PLAN:   Your next appointment is scheduled for n/a - patient requests to cancel final appointment that was scheduled for 10/8 at 11:00 AM, discussed she may reach out if she changes her mind  Assignment/Objectives /interventions for next session:   - keep appointment with JEANNIE Schroeder CNP    - keep pacing and using skills to self-soothe or manage pain    We believe regular attendance is key to your success in our program!    Any time you are unable to keep your appointment we ask that you call us at [phone] at least 24 hours in advance to cancel.This will allow us to offer the appointment time to another patient.   Multiple missed appointments may lead to dismissal from the clinic.    Telemedicine Visit: The patient's condition can be safely assessed and treated via synchronous audio and visual telemedicine encounter.      Reason for Telemedicine Visit: Services only offered telehealth    Originating Site (Patient Location): Patient's home    Distant Site (Provider Location): Provider Remote Setting- Home Office    Consent:  The patient/guardian has verbally  consented to: the potential risks and benefits of telemedicine (video visit) versus in person care; bill my insurance or make self-payment for services provided; and responsibility for payment of non-covered services.     Mode of Communication:  Video Conference via Zank    As the provider I attest to compliance with applicable laws and regulations related to telemedicine.      Karen Kohler PsyD LP  Licensed Psychologist  Outpatient Clinic Therapist  [facility]

## 2024-09-27 ENCOUNTER — HOSPITAL ENCOUNTER (OUTPATIENT)
Dept: MRI IMAGING | Facility: CLINIC | Age: 49
Discharge: HOME | End: 2024-09-27
Attending: OBSTETRICS & GYNECOLOGY | Admitting: OBSTETRICS & GYNECOLOGY
Payer: COMMERCIAL

## 2024-09-27 DIAGNOSIS — R10.2 PELVIC PAIN IN FEMALE: ICD-10-CM

## 2024-09-27 PROCEDURE — 255N000002 HC RX 255 OP 636: Performed by: OBSTETRICS & GYNECOLOGY

## 2024-09-27 PROCEDURE — 72197 MRI PELVIS W/O & W/DYE: CPT

## 2024-09-27 PROCEDURE — A9585 GADOBUTROL INJECTION: HCPCS | Performed by: OBSTETRICS & GYNECOLOGY

## 2024-09-27 RX ORDER — GADOBUTROL 604.72 MG/ML
7.5 INJECTION INTRAVENOUS ONCE
Status: COMPLETED | OUTPATIENT
Start: 2024-09-27 | End: 2024-09-27

## 2024-09-27 RX ADMIN — GADOBUTROL 7.5 ML: 604.72 INJECTION INTRAVENOUS at 14:52

## 2024-09-29 ENCOUNTER — MYC MEDICAL ADVICE (OUTPATIENT)
Dept: ANESTHESIOLOGY | Facility: CLINIC | Age: 49
End: 2024-09-29
Payer: COMMERCIAL

## 2024-09-29 ASSESSMENT — PAIN SCALES - PAIN ENJOYMENT GENERAL ACTIVITY SCALE (PEG)
INTERFERED_GENERAL_ACTIVITY: 10
PEG_TOTALSCORE: 9.33
INTERFERED_GENERAL_ACTIVITY: 10 - COMPLETELY INTERFERES
AVG_PAIN_PASTWEEK: 8
INTERFERED_ENJOYMENT_LIFE: 10
INTERFERED_ENJOYMENT_LIFE: 10 - COMPLETELY INTERFERES

## 2024-09-30 ENCOUNTER — VIRTUAL VISIT (OUTPATIENT)
Dept: ANESTHESIOLOGY | Facility: CLINIC | Age: 49
End: 2024-09-30
Payer: COMMERCIAL

## 2024-09-30 ENCOUNTER — NURSE TRIAGE (OUTPATIENT)
Dept: SURGERY | Facility: CLINIC | Age: 49
End: 2024-09-30

## 2024-09-30 DIAGNOSIS — G89.29 CHRONIC BILATERAL LOW BACK PAIN WITH BILATERAL SCIATICA: ICD-10-CM

## 2024-09-30 DIAGNOSIS — R10.84 GENERALIZED ABDOMINAL PAIN: ICD-10-CM

## 2024-09-30 DIAGNOSIS — M53.3 PAIN OF BOTH SACROILIAC JOINTS: ICD-10-CM

## 2024-09-30 DIAGNOSIS — G89.29 CHRONIC NECK PAIN: ICD-10-CM

## 2024-09-30 DIAGNOSIS — M54.6 CHRONIC BILATERAL THORACIC BACK PAIN: ICD-10-CM

## 2024-09-30 DIAGNOSIS — G89.4 CHRONIC PAIN SYNDROME: Primary | ICD-10-CM

## 2024-09-30 DIAGNOSIS — M79.18 MYOFASCIAL PAIN: ICD-10-CM

## 2024-09-30 DIAGNOSIS — M54.2 CHRONIC NECK PAIN: ICD-10-CM

## 2024-09-30 DIAGNOSIS — M54.41 CHRONIC BILATERAL LOW BACK PAIN WITH BILATERAL SCIATICA: ICD-10-CM

## 2024-09-30 DIAGNOSIS — G89.29 CHRONIC BILATERAL THORACIC BACK PAIN: ICD-10-CM

## 2024-09-30 DIAGNOSIS — M54.42 CHRONIC BILATERAL LOW BACK PAIN WITH BILATERAL SCIATICA: ICD-10-CM

## 2024-09-30 PROCEDURE — 99417 PROLNG OP E/M EACH 15 MIN: CPT | Mod: 95

## 2024-09-30 PROCEDURE — 99215 OFFICE O/P EST HI 40 MIN: CPT | Mod: 95

## 2024-09-30 ASSESSMENT — PAIN SCALES - GENERAL: PAINLEVEL: SEVERE PAIN (6)

## 2024-09-30 NOTE — TELEPHONE ENCOUNTER
Caller reporting the following red-flag symptom(s): After procedure pain at procedure site and spreading to the right side. CT scan was completed. Incision sites are crusted over. 1 incision site is yellow in color. She says it hurts bending over.     Per the system red-flag symptom policy, patient was instructed to:  speak with a Registered Nurse    Action:  Patient warm transferred to a Registered Nurse

## 2024-09-30 NOTE — LETTER
9/30/2024       RE: Radha Beckwith  [address]  [facility] [address]     Dear Colleague,    Thank you for referring your patient, Radha Beckwith, to the [facility] FOR COMPREHENSIVE PAIN MANAGEMENT [place] at [facility]. Please see a copy of my visit note below.    Video-Visit Details    Type of service:  Video Visit     Originating Location (pt. Location): Home    Distant Location (provider location):  On-site  Platform used for Video Visit: [facility]     Date of visit: 9/30/2024      Assessment:   Radha Beckwith is a 48 year old female with a past medical history significant for chronic bilateral low back pain, depression, SVT, opioid dependence in remission, anxiety, tobacco use, GERD, restless legs who presents with complaints of neck pain, mid and low back.      Low back pain - Onset of pain within last 2 years. Pain is mostly axial, though she does have intermittent radicular leg symptoms, occasionally extends below the knee. On exam, positive facet loading and positive KATHY, sacral thrust and Yeoman's bilaterally. Positive myofascial TTP, negative SLR and neuro exam WNL. Etiology is likely associated with multiple factors, including underlying degenerative changes of lumbar spine, facet and SIJ mediated components, with prominent overlying myofascial component.   Neck/thoracic back pain - Onset of pain within last 2 years. On exam, positive for myofascial TTP, most notably in lower thoracic paraspinals. Etiology is likely associated with multiple factors, including underlying degenerative changes, with prominent overlying myofascial component.      Assigned to [place] nursing team.     Visit Diagnoses:  1. Chronic pain syndrome    2. Chronic bilateral thoracic back pain    3. Pain of both sacroiliac joints    4. Chronic bilateral low back pain with bilateral sciatica    5. Myofascial pain     6. Chronic neck pain    7. Generalized abdominal pain        Plan:  Diagnosis reviewed, treatment option addressed, and risk/benifits discussed.  Self-care instructions given.  I am recommending a multidisciplinary treatment plan to help this patient better manage their pain.      Pain Physical Therapy:  Recent PT for low back/SI and neck pain. Continues HEP.      Pain Psychology: YES - last visit with Karen Kohler PhD on 9/30/24.      Diagnostic Studies:  None      Medication Management:   Duloxetine -  Current dose 60 mg twice daily, increased from 90 mg/day to 120 mg/day at last visit. Will discuss outcome with dose adjustment at follow up.   Muscle relaxants:   Continue baclofen 20 mg 3 times daily.  Reports enhanced effect with adding afternoon dose, mild daytime sedation effects are manageable.  Of note, she reports recently starting buspirone 3 times daily, which may also be contributing to CNS depression effects.  Recommended taking baclofen and buspirone at separate times.   Flexeril - 5-10 mg twice daily as needed. PCP managing. Recommend reducing use to once daily as needed if helpful on days with increased pain.   Allow 4-6 hours in between all muscle relaxant doses, do not mix with alcohol.   Medical [place] - [place] at last visit on 1/3/24. No clear benefit for pain with daytime products, some benefit at night with products used at bedtime.       Potential procedures:   Bilateral cervical 3,4,5 medial branch nerve block #1 on 6/27/24. She was scheduled for MBB #2 on 9/10/24 but it appears this was not completed.   Will revisit CMBB/RFA plan to target neck pain at follow up.      Other Orders/Referrals:   Recommend using TENS unit a few times throughout the day as needed, particularly when engaging in activities that may increase pain.      Follow up with JEANNIE Schroeder CNP on 10/1/24 at 9:30 am via video visit. This visit will focus on covering additional  "concerns that were not addressed today due to time constraints.     Additional visit details: Linette sent messages via [employer] prior to today's visit detailing multiple concerns related to poorly managed pain, not clear on provider roles of treatment team. Due to time constraints, we did not have enough time to address all of her concerns. She agreed to scheduling another follow up as an extension of visit to finishing addressing current concerns.     Review of Electronic Chart: Today I have also reviewed available medical information in the patient's medical record at [facility] [place] ([facility]) and Care Everywhere (if available), including relevant provider notes, laboratory work, and imaging.     Jenny Landrum, DNP, APRN, AGNP-C  [facility]     -------------------------------------------------    Subjective:    Chief complaint:   Chief Complaint   Patient presents with     Pain     RECHECK     Follow up with pain management-increased pain, more than \"Iwas\"       Interval history:  Radha Beckwith is a 48 year old female last seen on 8/28/24.  They are a patient of mine seen in follow up.     Recommendations/plan at the last visit included:  Pain Physical Therapy:  Recent PT for low back/SI and neck pain. Continue therapy and activity as tolerated at home.      Pain Psychologist to address relaxation, behavioral change, coping style, and other factors important to improvement.  YES - continue working with Karen Kohler PhD.      Diagnostic Studies:  None      Medication Management:   Duloxetine -  Current dose 60 mg in morning and 30 mg at bedtime. Appreciates benefit without side effects, the pain control optimized.  Recommend dose increase to 60 mg twice daily, monitor for enhanced pain benefit.  Updated prescription sent to pharmacy today.  Gabapentin tapered and she discontinued 300 mg dose in the morning since last visit.  Of note, she requested to taper off medication due to concerns " for weight gain.  She reports 20 pound weight loss since stopping gabapentin.  Muscle relaxants:   Continue baclofen 20 mg 3 times daily.  Reports enhanced effect with adding afternoon dose, mild daytime sedation effects are manageable.  Of note, she reports recently starting buspirone 3 times daily, which may also be contributing to CNS depression effects.  Recommended taking baclofen and buspirone at separate times.   Flexeril - 5-10 mg twice daily as needed. PCP managing. Recommend reducing use to once daily as needed if helpful on days with increased pain.   Allow 4-6 hours in between all muscle relaxant doses, do not mix with alcohol.   [place] on 1/3/24. No clear benefit for pain with daytime products, some benefit at night with products used at bedtime.       Potential procedures:   Bilateral cervical 3,4,5 medial branch nerve block #1 on 6/27/24. She was not able to evaluate pain relief accurately due to benzo use for anxiety. MBB #2 scheduled on 9/10/24.  Pending outcome with second block, we will consider repeat MBB #1 if appropriate/indicated.  I recommended she discuss results directly with nursing team and compare outcome to first block.   Advised that we may encounter insurance coverage issues with repeat MBB #1, though I do think we have reasonable grounds for appeal if denied due to benzodiazepine use for first block, which affected her cognitive functioning/ability to fairly evaluate results, and utilizing local anesthetic only for the second block     Other Orders/Referrals:   Recommend using TENS unit a few times throughout the day as needed, particularly when engaging in activities that may increase pain.      Follow up with JEANNIE Schroeder CNP in around 6 weeks for medication management, video visit okay.     Since her last visit, Radha Beckwith reports:  -She reports pain has gotten much worse. Mood and ADHD has worsened  "as well.   -She sent a lengthy K[employer] message yesterday to clarify my role in her care.   -She has been seen both internally/externally.   -She has engaged in extensive PT over time. She states that she's \"fallen off\" with PT appts for now, continues HEP.   -She notes that it is hard to remember which providers are managing different conditions/areas of pain.   -She asks if chiropractor is a good idea for her.   -She had hernia repair surgery on 8/30/24, concerns for complications and increased pain. States her friend (who attended ED visit with her after hernia surgery) advised she should have never gone forward with surgery. Friend states that the surgery would never work due to her body type.   -She notes that Suboxone was started 15+ years ago, was on methadone in the past.   -She asks if she should contact surgeon since it has been 1 month since surgery and still having more pain and different symptoms than pre-op.   -She will prepare a list of painful areas/concerns for next visit and will make sure we are clear on plan moving forward.     Members of Care Team/Location:  -External psych/MH counselor, only providers outside the [facility] system.   -Chiropractor: [facility]. Appointments intermittent, goes in when pain is increased. She notes feeling good when she is walking out of clinic after adjustment, but also thinks pain symptoms may get flared up after adjustments. Tx includes light touch/massage, spinal adjustment.   -Dr. Dozier performed hernia repair surgery. Reports hernia recurrence after surgery, ED visits. Continues to have significant pain after surgery.     PEG: A Three-Item Scale Assessing Pain Intensity and Interference    What number best describes your PAIN ON AVERAGE in the past week? 8    What number best describes how, during the past week, pain has interfered with your ENJOYMENT OF LIFE? 10 - Completely interferes    What number best describes how, during the past week, " "pain has interfered with your GENERAL ACTIVITY? 10 - Completely interferes    PEG Total Score: 9.33    Fabian EE, Hayden KA, Karie MJ, Anjana TA, Álvarez J, Joyce JM, Ban SM, Brandie K. Development and initial validation of the PEG, a 3-item scale assessing pain intensity and interference. Journal of General Internal Medicine. 2009 [place];24:733-738.        HPI/Interval history from last visit on 8/28/24:  -She states her pain has been \"crappy\" since last visit.   -She was referred to Karen Kohler for pain psych. She has started with   -She had CMBB #1 on 6/27, took benzo for anxiety and fell asleep after the injection. Not able to assess pain relief accurately. She has second block coming up on 9/10, will not be taking benzo. Pending outcome with injection, will consider repeat MBB #1.   -Duloxetine dose 60 mg in AM and 30 mg in evening. She appreciates benefit with sciatica pain, less intense symptoms.   -She stopped gabapentin 300 mg in morning since last visit. She reports 20 pound weight loss since stopping medication. She had reported concerns for weight gain on gabapentin.   -She has hernia surgery on 8/30.   -She is interested in dose increase of duloxetine to 60 mg BID.   -She reports having more muscle spasms in low back, takes muscle relaxants.   -Baclofen dose 20 mg TID. She appreciates benefit with use.         Current Pain Treatments:                Suboxone 2-0.5 mg daily (weaning off)              Ropinirole 1 mg at bedtime               Cyclobenzaprine 5-10 mg QD PRN                 Baclofen 20 mg TID              Duloxetine 60 mg BID                 TENS              PT - low back/SI and recent referral for neck pain completed. Continues HEP.              CMBB/RFA - MBB #2 not completed on 9/10, will revisit plan at follow up        Current MME: N/A     Review of [place] Prescription Monitoring Program (): No concern for abuse or misuse of controlled medications based on this report. Reviewed - " appears appropriate.      Past pain treatments:  SI joint injection 5/16/23 -significant benefit x1 month, then diminishing efficacy  KINDRA on 4/19/24 with Dr. Ivory - marginal benefit   Gabapentin       Medications:  Current Outpatient Medications   Medication Sig Dispense Refill     acetaminophen (TYLENOL) 500 MG tablet Take 1,000 mg by mouth every 6 hours as needed for mild pain       ALPRAZolam (XANAX) 0.5 MG tablet        baclofen (LIORESAL) 20 MG tablet Take 1 tablet (20 mg) by mouth 3 times daily 90 tablet 1     bisacodyl (DULCOLAX) 5 MG EC tablet Take 1 tablet (5 mg) by mouth every other day Take every other day. If no bowel movement for 2 or more days, take 2 tablets of bisacodyl (10 mg) 30 tablet 2     buprenorphine HCl-naloxone HCl (SUBOXONE) 2-0.5 MG per film Place 0.5 Film under the tongue daily        busPIRone HCl (BUSPAR) 30 MG tablet 30 mg 3 times daily       CONSTULOSE 10 GM/15ML solution TAKE 15 MLS (10 G) BY MOUTH 3 TIMES DAILY AS NEEDED FOR CONSTIPATION (AS NEEDED FOR SEVERE CONSTIPAITON, NO BM FOR MORE THAN 3 DAYS AND FEELING CONSTIPATED) 300 mL 3     cyclobenzaprine (FLEXERIL) 5 MG tablet TAKE ONE TO TWO TABLETS BY MOUTH THREE TIMES A DAY AS NEEDED FOR MUSCLE SPASMS 90 tablet 5     DULoxetine (CYMBALTA) 60 MG capsule Take 1 capsule (60 mg) by mouth 2 times daily. 180 capsule 0     famotidine (PEPCID) 40 MG tablet Take 1 tablet (40 mg) by mouth 2 times daily 60 tablet 1     linaclotide (LINZESS) 72 MCG capsule Take 1 capsule (72 mcg) by mouth every morning (before breakfast). 30 capsule 11     medical cannabis (Patient's own supply) See Admin Instructions. [place] Card- Purchases through [facility].   (The purpose of this order is to document that the patient reports taking medical cannabis.  This is not a prescription, and is not used to certify that the patient has a qualifying medical condition.)       ondansetron (ZOFRAN ODT) 4 MG ODT tab DISSOLVE ONE TABLET  ON TONGUE EVERY 6 HOURS AS NEEDED FOR NAUSEA 20 tablet 1     pantoprazole (PROTONIX) 40 MG EC tablet TAKE ONE TABLET BY MOUTH TWICE A DAY WITH MEALS 60 tablet 5     rOPINIRole (REQUIP) 1 MG tablet TAKE ONE TABLET BY MOUTH EVERY NIGHT AT BEDTIME 90 tablet 0     simvastatin (ZOCOR) 10 MG tablet TAKE ONE TABLET BY MOUTH EVERY NIGHT AT BEDTIME 90 tablet 1     temazepam (RESTORIL) 15 MG capsule        propranolol (INDERAL) 10 MG tablet          Medical History: any changes in medical history since they were last seen? Yes - multiple ED visits, r/t uncontrolled pain after hernia surgery on 8/30/24.       Objective:    Physical Exam:  GENERAL: alert and no distress  EYES: Eyes grossly normal to inspection.  No discharge or erythema, or obvious scleral/conjunctival abnormalities.  RESP: No audible wheeze, cough, or visible cyanosis.    SKIN: Visible skin clear. No significant rash, abnormal pigmentation or lesions.  NEURO: Cranial nerves grossly intact.  Mentation and speech appropriate for age.  PSYCH: Appropriate affect, tone, and pace of words     Diagnostic Tests/Imaging/Labs:  CMP on 9/5/24 - WNL, GFR >90  Reviewed recent pelvic MRI and abdominal CTs - MRI unremarkable, CT scans demonstrated postsurgical changes though no urgent findings.       BILLING TIME DOCUMENTATION:   The total TIME spent on this patient on the date of the encounter/appointment was 60 minutes.      TOTAL TIME includes:   Time spent preparing to see the patient (reviewing records and tests)   Time spent face to face (or over the phone) with the patient   Time spent ordering tests, medications, procedures and referrals   Time spent Referring and communicating with other healthcare professionals   Time spent documenting clinical information in Epic       Again, thank you for allowing me to participate in the care of your patient.      Sincerely,    JEANNIE Schroeder CNP

## 2024-09-30 NOTE — NURSING NOTE
"How will you be connecting to the visit? MyChart  How would you like to obtain your AVS? MyChart    If the video visit is dropped, the invitation should be resent by: Text to cell phone: [phone]  Will anyone else be joining your video visit? No    Are you currently in the [facility]  yes          Patient presents with:  Pain  RECHECK: Follow up with pain management-increased pain, more than \"Iwas\"      Severe Pain (6)         What medications are you using for pain? Tylenol, suboxone film      What refills are you needing today? Not sure    Expectations: follow up-\"I don't want to be in pain anymore.\"    Mirian Diego, CYNDI      "

## 2024-09-30 NOTE — TELEPHONE ENCOUNTER
Writer spoke with patient regarding below. Writer consulted Dr. Dozier, triage note below reviewed, based off below, writer recommended ED for further evaluation.       Mirela Schulz RN on 9/30/2024 at 3:54 PM

## 2024-09-30 NOTE — PATIENT INSTRUCTIONS
Follow up scheduled on 10/1/24 at 9:30 am via video visit to continue discussion from today's visit and address remaining concerns that we did not have time to cover.

## 2024-09-30 NOTE — TELEPHONE ENCOUNTER
"Had Surgery 8/30 herniorrhaphy/laparaxcopy. Discharged same day, lots of pain. Back in ED 8/31 and 9/5. General surgeon Dr. Dozier and Pain and Palliative medicine MD Dr. Landrum. Also [facility] Provider who has prescribed Ceboxone. Per patient has had problems with getting pain meds properly prescribed, so trying to reach surgical team. Has chronic pain but calling about surgical pain.    Patient has been told by Palliative team to contact surgical team to help manage this pain/issues . Patient very frustrated at being \"bounced around\" and feels she has been labeled as Drug user from the [facility] Provider so everyone is doing so.      Pain is at surgical site, also extends down 2 1/2 inch, painful to touch, nausea present, also radiates to R side of incision. Lump is getting larger. Hernia was above umbilicus 4-5 inches. Patient feels it getting larger and more tender inside. Rating pain level at highest level at \"9\". Sitting at rest is a \"6\" constant. No drainage or reddness on incision. Afebrile. No vomiting but nausea present when pain is worsening. Using Zofran for this. Using for pain control, 1000 mg q 6 hours. Also has muscle relaxer's for chronic pain.     Please call patient to discuss pain management and also the increased swelling/pain that she feels is occurring internally where hernia was removed.     Kina KAM RN  P Central Nursing/Red Flag Triage & Med Refill Team  Reason for Disposition   Patient sounds very sick or weak to the triager    Additional Information   Negative: Sounds like a life-threatening emergency to the triager   Negative: Chest pain   Negative: Difficulty breathing   Negative: Acting confused (e.g., disoriented, slurred speech) or excessively sleepy   Negative: Surgical incision symptoms and questions   Negative: Pain or burning with passing urine (urination) and male   Negative: Pain or burning with passing urine (urination) and female   Negative: Constipation   Negative: New or " worsening leg (calf, thigh) pain   Negative: New or worsening leg swelling   Negative: Dizziness is severe, or persists > 24 hours after surgery   Negative: Symptoms arising from use of a urinary catheter (Claudio or Coude)   Negative: Cast problems or questions   Negative: Medication question   Negative: Bright red, wide-spread, sunburn-like rash   Negative: SEVERE headache and after spinal (epidural) anesthesia   Negative: Vomiting and persists > 4 hours   Negative: Vomiting and abdomen looks much more swollen than usual   Negative: Drinking very little and dehydration suspected (e.g., no urine > 12 hours, very dry mouth, very lightheaded)    Protocols used: Post-Op Symptoms and Imzsgnets-W-SZ

## 2024-09-30 NOTE — TELEPHONE ENCOUNTER
We [employer] messages reviewed and discussed concerns during visit today. See OV note for additional information.     Jenny Landrum DNP, APRN, AGNP-C  [facility]

## 2024-10-01 ENCOUNTER — VIRTUAL VISIT (OUTPATIENT)
Dept: ANESTHESIOLOGY | Facility: CLINIC | Age: 49
End: 2024-10-01
Payer: COMMERCIAL

## 2024-10-01 DIAGNOSIS — G89.29 CHRONIC NECK PAIN: ICD-10-CM

## 2024-10-01 DIAGNOSIS — M53.3 PAIN OF BOTH SACROILIAC JOINTS: ICD-10-CM

## 2024-10-01 DIAGNOSIS — G89.29 CHRONIC BILATERAL LOW BACK PAIN WITH BILATERAL SCIATICA: ICD-10-CM

## 2024-10-01 DIAGNOSIS — M54.42 CHRONIC BILATERAL LOW BACK PAIN WITH BILATERAL SCIATICA: ICD-10-CM

## 2024-10-01 DIAGNOSIS — G89.4 CHRONIC PAIN SYNDROME: Primary | ICD-10-CM

## 2024-10-01 DIAGNOSIS — M54.2 CHRONIC NECK PAIN: ICD-10-CM

## 2024-10-01 DIAGNOSIS — R10.84 GENERALIZED ABDOMINAL PAIN: ICD-10-CM

## 2024-10-01 DIAGNOSIS — M54.41 CHRONIC BILATERAL LOW BACK PAIN WITH BILATERAL SCIATICA: ICD-10-CM

## 2024-10-01 DIAGNOSIS — M79.18 MYOFASCIAL PAIN: ICD-10-CM

## 2024-10-01 DIAGNOSIS — M47.812 ARTHROPATHY OF CERVICAL FACET JOINT: ICD-10-CM

## 2024-10-01 PROCEDURE — 99215 OFFICE O/P EST HI 40 MIN: CPT | Mod: 95

## 2024-10-01 ASSESSMENT — PAIN SCALES - PAIN ENJOYMENT GENERAL ACTIVITY SCALE (PEG)
PEG_TOTALSCORE: 8.67
INTERFERED_ENJOYMENT_LIFE: 10 - COMPLETELY INTERFERES
AVG_PAIN_PASTWEEK: 7
INTERFERED_ENJOYMENT_LIFE: 10
PEG_TOTALSCORE: 8.67
AVG_PAIN_PASTWEEK: 7
INTERFERED_GENERAL_ACTIVITY: 9
INTERFERED_GENERAL_ACTIVITY: 9

## 2024-10-01 ASSESSMENT — PAIN SCALES - GENERAL: PAINLEVEL: EXTREME PAIN (8)

## 2024-10-01 NOTE — Clinical Note
Please help facilitate rescheduling CMBB #2. Not sure if there are PA issues (Linette mentioned this last week) or if new orders needed from me. Let me know what I need to do, if anything, to help with this.   Thanks, A

## 2024-10-01 NOTE — Clinical Note
Please call patient to schedule for in person visit with me next week in [place]. I have an opening at 3 pm right now on 10/10.   Thanks, Jenny

## 2024-10-01 NOTE — NURSING NOTE
How will you be connecting to the visit? Mychart  How would you like to obtain your AVS? Mychart  If the video visit is dropped, the invitation should be resent by: Text to cell phone: [phone]  Will anyone else be joining your video visit? No  Are you currently in the [facility]? Yes    if patient encounters technical issues they should call [phone]     Patient presents with:  Pain  RECHECK: Follow up      Extreme Pain (8)           What medications are you using for pain? Tylenol  What refills are you needing today? Not sure  Expectations: continue from yesterday    Marilia Baig, CMA

## 2024-10-01 NOTE — LETTER
10/1/2024       RE: Radha Beckwith  [address]  [facility] [address]     Dear Colleague,    Thank you for referring your patient, Radha Beckwith, to the [facility] FOR COM[facility]ENSIVE PAIN MANAGEMENT [place] at [facility]. Please see a copy of my visit note below.    Video-Visit Details    Type of service:  Video Visit     Originating Location (pt. Location): Home    Distant Location (provider location):  Off-site  Platform used for Video Visit: Located within [facility]     Date of visit: 10/1/2024      Assessment:   Radha Beckwith is a 48 year old female with a past medical history significant for chronic bilateral low back pain, depression, SVT, opioid dependence in remission, anxiety, tobacco use, GERD, restless legs who presents with complaints of neck pain, mid and low back.      Low back pain - Onset of pain within last 2 years. Pain is mostly axial, though she does have intermittent radicular leg symptoms, occasionally extends below the knee. On exam, positive facet loading and positive KATHY, sacral thrust and Yeoman's bilaterally. Positive myofascial TTP, negative SLR and neuro exam WNL. Etiology is likely associated with multiple factors, including underlying degenerative changes of lumbar spine, facet and SIJ mediated components, with prominent overlying myofascial component.   Neck/thoracic back pain - Onset of pain within last 2 years. On exam, positive for myofascial TTP, most notably in lower thoracic paraspinals. Etiology is likely associated with multiple factors, including underlying degenerative changes, with prominent overlying myofascial component.      Assigned to [place] nursing team.     Visit Diagnoses:  1. Chronic pain syndrome    2. Chronic bilateral low back pain with bilateral sciatica    3. Pain of both sacroiliac joints    4. Chronic neck pain    5. Generalized abdominal pain    6.  Myofascial pain    7. Arthropathy of cervical facet joint        Plan:  Diagnosis reviewed, treatment option addressed, and risk/benifits discussed.  Self-care instructions given.  I am recommending a multidisciplinary treatment plan to help this patient better manage their pain.      Pain Physical Therapy:  Recent PT for low back/SI and neck pain. Continues HEP.      Pain Psychology: YES - last visit with Karen Kohler PhD on 9/30/24.      Diagnostic Studies:  None      Medication Management:   Duloxetine -  Current dose 60 mg twice daily, increased from 90 mg/day to 120 mg/day at recent visit. Will discuss outcome with dose adjustment at future follow up.   Muscle relaxants:   Continue baclofen 20 mg 3 times daily.  Reports enhanced effect with adding afternoon dose, mild daytime sedation effects are manageable.  Of note, she reports recently starting buspirone 3 times daily, which may also be contributing to CNS depression effects.  Recommended taking baclofen and buspirone at separate times.   Flexeril - 5-10 mg twice daily as needed. PCP managing. Recommend reducing use to once daily as needed if helpful on days with increased pain.   Allow 4-6 hours in between all muscle relaxant doses, do not mix with alcohol.   [place] - [place] at last visit on 1/3/24. No clear benefit for pain with daytime products, some benefit at night with products used at bedtime.       Potential procedures:   SI joint injections in the past x 2 - had benefit with 1st injection, mixed/marginal benefit with 2nd. It is likely there are multiple associated factors and will need to repeat physical exam to determine if SI joint versus other injection may be indicated.   Bilateral cervical 3,4,5 medial branch nerve block #1 on 6/27/24. She was scheduled for MBB #2 on 9/10/24 but was cancelled due to s/s of infection. She is not clear on next steps for rescheduling, also received a [employer] message  referencing PA and questions if there are insurance issues.   Will message nursing team to further assist.        Other Therapies/Orders/Referrals:   TENS unit - continue use throughout day as needed.   Surgical team (Dr. Dozier) - will connect with them to clarify postop concerns present, including: persistent elevated pain with marginal improvement since surgery, hiccups and change in stools (new onset since surgery).   Pain Team will update patient as needed, if surgical team does not plan to reach out to her to discuss concerns.      Follow up with JEANNIE Schroeder CNP around 1 week for in person visit. Will have scheduling team contact patient to assist with making appointment.       Review of Electronic Chart: Today I have also reviewed available medical information in the patient's medical record at [facility] ([facility]) and Care Everywhere (if available), including relevant provider notes, laboratory work, and imaging.     Jenny Landrum DNP, JEANNIE, AGNP-C  [facility]     -------------------------------------------------    Subjective:    Chief complaint:   Chief Complaint   Patient presents with     Pain     RECHECK     Follow up       Interval history:  Radha Beckwith is a 48 year old female last seen on 9/30/24.  They are a patient of mine seen in follow up.     Recommendations/plan at the last visit included:  Pain Physical Therapy:  Recent PT for low back/SI and neck pain. Continues HEP.      Pain Psychology: YES - last visit with Karen Kohler PhD on 9/30/24.      Diagnostic Studies:  None      Medication Management:   Duloxetine -  Current dose 60 mg twice daily, increased from 90 mg/day to 120 mg/day at last visit. Will discuss outcome with dose adjustment at follow up.   Muscle relaxants:   Continue baclofen 20 mg 3 times daily.  Reports enhanced effect with adding afternoon dose, mild daytime sedation effects are manageable.  Of note, she reports recently starting  "buspirone 3 times daily, which may also be contributing to CNS depression effects.  Recommended taking baclofen and buspirone at separate times.   Flexeril - 5-10 mg twice daily as needed. PCP managing. Recommend reducing use to once daily as needed if helpful on days with increased pain.   Allow 4-6 hours in between all muscle relaxant doses, do not mix with alcohol.   [place] on 1/3/24. No clear benefit for pain with daytime products, some benefit at night with products used at bedtime.       Potential procedures:   Bilateral cervical 3,4,5 medial branch nerve block #1 on 6/27/24. She was scheduled for MBB #2 on 9/10/24 but it appears this was not completed.   Will revisit CMBB/RFA plan to target neck pain at follow up.      Other Orders/Referrals:   Recommend using TENS unit a few times throughout the day as needed, particularly when engaging in activities that may increase pain.      Follow up with JEANNIE Schroeder CNP on 10/1/24 at 9:30 am via video visit. This visit will focus on covering additional concerns that were not addressed today due to time constraints.      Additional visit details: Linette sent messages via [employer] prior to today's visit detailing multiple concerns related to poorly managed pain, not clear on provider roles of treatment team. Due to time constraints, we did not have enough time to address all of her concerns. She agreed to scheduling another follow up as an extension of visit to finishing addressing current concerns.     Since her last visit, Radha Beckwith reports:  -This visit today is a continuation from yesterday's visit.   -She called surgeon's office yesterday, was told she had to go to ED, which she notes   -She describes \"bulge\" in surgical area, pain has pattern of worsening throughout day.   -She denies concerns with incision site at this point.   -She reports changes in bowel movements, long and skinny " stools. This is different than before surgery.   -She is also getting hiccups more. Never had hiccups before.     -Neck pain is being addressed by MBB/RFA. She was scheduled for block #2 on 9/10, but she had s/s of infection and was not feeling well after surgery.   -She also questions if there were insurance issues with second block, not sure if she was supposed to call back.   -She continues to have persistent low back pain, describes painful area above buttocks.   -She had SI joint injections x 2 in the past, benefit with first one and mixed/marginal benefit with second.   -She had consult with Dr. Arteaga and was referred to Dr. Rosario for SI focused therapy.   -She is not following with Spine team and needs to consolidate care for LBP with clinic again.   -She is understanding that it has been a while since we focused on low back and will need to dedicate more attention at next visit. Recommend in person follow up for repeat physical exam.   -She is agreeable to follow up again at my next available in [place].       PEG: A Three-Item Scale Assessing Pain Intensity and Interference    What number best describes your PAIN ON AVERAGE in the past week? 7    What number best describes how, during the past week, pain has interfered with your ENJOYMENT OF LIFE? 10 - Completely interferes    What number best describes how, during the past week, pain has interfered with your GENERAL ACTIVITY? 9    PEG Total Score: 8.67    Fabian VERA, Hayden KA, Karie MJ, Anjana TA, Henri J, Joyce JM, Ban SM, Brandie K. Development and initial validation of the PEG, a 3-item scale assessing pain intensity and interference. Journal of General Internal Medicine. 2009 [place];24:733-738.        HPI/Interval history from last visit on 9/30/24:  -She reports pain has gotten much worse. Mood and ADHD has worsened as well.   -She sent a lengthy [employer] message yesterday to clarify my role in her care.   -She has been seen both internally/externally.  "  -She has engaged in extensive PT over time. She states that she's \"fallen off\" with PT appts for now, continues HEP.   -She notes that it is hard to remember which providers are managing different conditions/areas of pain.   -She asks if chiropractor is a good idea for her.   -She had hernia repair surgery on 8/30/24, concerns for complications and increased pain. States her friend (who attended ED visit with her after hernia surgery) advised she should have never gone forward with surgery. Friend states that the surgery would never work due to her body type.   -She notes that Suboxone was started 15+ years ago, was on methadone in the past.   -She asks if she should contact surgeon since it has been 1 month since surgery and still having more pain and different symptoms than pre-op.   -She will prepare a list of painful areas/concerns for next visit and will make sure we are clear on plan moving forward.      Members of Care Team/Location:  -External psych/MH counselor, only providers outside the [facility].   -Chiropractor: [facility]. Appointments intermittent, goes in when pain is increased. She notes feeling good when she is walking out of clinic after adjustment, but also thinks pain symptoms may get flared up after adjustments. Tx includes light touch/massage, spinal adjustment.   -Dr. Dozier performed hernia repair surgery. Reports hernia recurrence after surgery, ED visits. Continues to have significant pain after surgery.         Current Pain Treatments:                Suboxone 2-0.5 mg daily (weaning off)              Ropinirole 1 mg at bedtime               Cyclobenzaprine 5-10 mg QD PRN                  Baclofen 20 mg TID              Duloxetine 60 mg BID                 TENS              PT - HEP              CMBB/RFA - MBB #2 needs to be rescheduled        Current MME: N/A     Review of [place] Prescription Monitoring Program (): No concern for abuse or misuse of controlled " medications based on this report. Reviewed - appears appropriate.      Past pain treatments:  SI joint injection 5/16/23 -significant benefit x1 month, then diminishing efficacy  KINDRA on 4/19/24 with Dr. Ivory - marginal benefit   Gabapentin       Medications:  Current Outpatient Medications   Medication Sig Dispense Refill     acetaminophen (TYLENOL) 500 MG tablet Take 1,000 mg by mouth every 6 hours as needed for mild pain       ALPRAZolam (XANAX) 0.5 MG tablet        baclofen (LIORESAL) 20 MG tablet Take 1 tablet (20 mg) by mouth 3 times daily 90 tablet 1     bisacodyl (DULCOLAX) 5 MG EC tablet Take 1 tablet (5 mg) by mouth every other day Take every other day. If no bowel movement for 2 or more days, take 2 tablets of bisacodyl (10 mg) 30 tablet 2     buprenorphine HCl-naloxone HCl (SUBOXONE) 2-0.5 MG per film Place 0.5 Film under the tongue daily        busPIRone HCl (BUSPAR) 30 MG tablet 30 mg 3 times daily       CONSTULOSE 10 GM/15ML solution TAKE 15 MLS (10 G) BY MOUTH 3 TIMES DAILY AS NEEDED FOR CONSTIPATION (AS NEEDED FOR SEVERE CONSTIPAITON, NO BM FOR MORE THAN 3 DAYS AND FEELING CONSTIPATED) 300 mL 3     cyclobenzaprine (FLEXERIL) 5 MG tablet TAKE ONE TO TWO TABLETS BY MOUTH THREE TIMES A DAY AS NEEDED FOR MUSCLE SPASMS 90 tablet 5     DULoxetine (CYMBALTA) 60 MG capsule Take 1 capsule (60 mg) by mouth 2 times daily. 180 capsule 0     famotidine (PEPCID) 40 MG tablet Take 1 tablet (40 mg) by mouth 2 times daily 60 tablet 1     linaclotide (LINZESS) 72 MCG capsule Take 1 capsule (72 mcg) by mouth every morning (before breakfast). 30 capsule 11     medical cannabis (Patient's own supply) See Admin Instructions. Has [place] Medical Cann[place] Card- Purchases through [facility].   (The purpose of this order is to document that the patient reports taking medical cannabis.  This is not a prescription, and is not used to certify that the patient has a qualifying medical condition.)       ondansetron  (ZOFRAN ODT) 4 MG ODT tab DISSOLVE ONE TABLET ON TONGUE EVERY 6 HOURS AS NEEDED FOR NAUSEA 20 tablet 1     pantoprazole (PROTONIX) 40 MG EC tablet TAKE ONE TABLET BY MOUTH TWICE A DAY WITH MEALS 60 tablet 5     rOPINIRole (REQUIP) 1 MG tablet TAKE ONE TABLET BY MOUTH EVERY NIGHT AT BEDTIME 90 tablet 0     simvastatin (ZOCOR) 10 MG tablet TAKE ONE TABLET BY MOUTH EVERY NIGHT AT BEDTIME 90 tablet 1     temazepam (RESTORIL) 15 MG capsule          Medical History: any changes in medical history since they were last seen? No      Objective:    Physical Exam:  GENERAL: alert and no distress  EYES: Eyes grossly normal to inspection.  No discharge or erythema, or obvious scleral/conjunctival abnormalities.  RESP: No audible wheeze, cough, or visible cyanosis.    SKIN: Visible skin clear. No significant rash, abnormal pigmentation or lesions.  NEURO: Cranial nerves grossly intact.  Mentation and speech appropriate for age.  PSYCH: Appropriate affect, tone, and pace of words     Diagnostic Tests/Imaging/Labs:  CMP on 9/5/24 - [facility]    BILLING TIME DOCUMENTATION:   The total TIME spent on this patient on the date of the encounter/appointment was 50 minutes.      TOTAL TIME includes:   Time spent preparing to see the patient (reviewing records and tests)   Time spent face to face (or over the phone) with the patient   Time spent ordering tests, medications, procedures and referrals   Time spent Referring and communicating with other healthcare professionals   Time spent documenting clinical information in Epic       Again, thank you for allowing me to participate in the care of your patient.      Sincerely,    JEANNIE Schroeder CNP

## 2024-10-01 NOTE — PROGRESS NOTES
Video-Visit Details    Type of service:  Video Visit     Originating Location (pt. Location): Home    Distant Location (provider location):  Off-site  Platform used for Video Visit: [facility]     Date of visit: 10/1/2024      Assessment:   Radha Beckwith is a 48 year old female with a past medical history significant for chronic bilateral low back pain, depression, SVT, opioid dependence in remission, anxiety, tobacco use, GERD, restless legs who presents with complaints of neck pain, mid and low back.      Low back pain - Onset of pain within last 2 years. Pain is mostly axial, though she does have intermittent radicular leg symptoms, occasionally extends below the knee. On exam, positive facet loading and positive KATHY, sacral thrust and Yeoman's bilaterally. Positive myofascial TTP, negative SLR and neuro exam WNL. Etiology is likely associated with multiple factors, including underlying degenerative changes of lumbar spine, facet and SIJ mediated components, with prominent overlying myofascial component.   Neck/thoracic back pain - Onset of pain within last 2 years. On exam, positive for myofascial TTP, most notably in lower thoracic paraspinals. Etiology is likely associated with multiple factors, including underlying degenerative changes, with prominent overlying myofascial component.      Assigned to [facility] nursing team.     Visit Diagnoses:  1. Chronic pain syndrome    2. Chronic bilateral low back pain with bilateral sciatica    3. Pain of both sacroiliac joints    4. Chronic neck pain    5. Generalized abdominal pain    6. Myofascial pain    7. Arthropathy of cervical facet joint        Plan:  Diagnosis reviewed, treatment option addressed, and risk/benifits discussed.  Self-care instructions given.  I am recommending a multidisciplinary treatment plan to help this patient better manage their pain.      Pain Physical Therapy:  Recent PT for low back/SI and neck  pain. Continues HEP.      Pain Psychology: YES - last visit with Karen Kohler PhD on 9/30/24.      Diagnostic Studies:  None      Medication Management:   Duloxetine -  Current dose 60 mg twice daily, increased from 90 mg/day to 120 mg/day at recent visit. Will discuss outcome with dose adjustment at future follow up.   Muscle relaxants:   Continue baclofen 20 mg 3 times daily.  Reports enhanced effect with adding afternoon dose, mild daytime sedation effects are manageable.  Of note, she reports recently starting buspirone 3 times daily, which may also be contributing to CNS depression effects.  Recommended taking baclofen and buspirone at separate times.   Flexeril - 5-10 mg twice daily as needed. PCP managing. Recommend reducing use to once daily as needed if helpful on days with increased pain.   Allow 4-6 hours in between all muscle relaxant doses, do not mix with alcohol.   Medical [place] - [place] at last visit on 1/3/24. No clear benefit for pain with daytime products, some benefit at night with products used at bedtime.       Potential procedures:   SI joint injections in the past x 2 - had benefit with 1st injection, mixed/marginal benefit with 2nd. It is likely there are multiple associated factors and will need to repeat physical exam to determine if SI joint versus other injection may be indicated.   Bilateral cervical 3,4,5 medial branch nerve block #1 on 6/27/24. She was scheduled for MBB #2 on 9/10/24 but was cancelled due to s/s of infection. She is not clear on next steps for rescheduling, also received a Post-it message referencing PA and questions if there are insurance issues.   Will message nursing team to further assist.        Other Therapies/Orders/Referrals:   TENS unit - continue use throughout day as needed.   Surgical team (Dr. Dozier) - will connect with them to clarify postop concerns present, including: persistent elevated pain with marginal  improvement since surgery, hiccups and change in stools (new onset since surgery).   Pain Team will update patient as needed, if surgical team does not plan to reach out to her to discuss concerns.      Follow up with JEANNIE Schroeder CNP around 1 week for in person visit. Will have scheduling team contact patient to assist with making appointment.       Review of Electronic Chart: Today I have also reviewed available medical information in the patient's medical record at [facility] ([facility]) and Care Everywhere (if available), including relevant provider notes, laboratory work, and imaging.     Jenny Landrum, NAOMI, JEANNIE, AGNP-C  [facility]     -------------------------------------------------    Subjective:    Chief complaint:   Chief Complaint   Patient presents with    Pain    RECHECK     Follow up       Interval history:  Radha Beckwith is a 48 year old female last seen on 9/30/24.  They are a patient of mine seen in follow up.     Recommendations/plan at the last visit included:  Pain Physical Therapy:  Recent PT for low back/SI and neck pain. Continues HEP.      Pain Psychology: YES - last visit with Karen Kohler PhD on 9/30/24.      Diagnostic Studies:  None      Medication Management:   Duloxetine -  Current dose 60 mg twice daily, increased from 90 mg/day to 120 mg/day at last visit. Will discuss outcome with dose adjustment at follow up.   Muscle relaxants:   Continue baclofen 20 mg 3 times daily.  Reports enhanced effect with adding afternoon dose, mild daytime sedation effects are manageable.  Of note, she reports recently starting buspirone 3 times daily, which may also be contributing to CNS depression effects.  Recommended taking baclofen and buspirone at separate times.   Flexeril - 5-10 mg twice daily as needed. PCP managing. Recommend reducing use to once daily as needed if helpful on days with increased pain.   Allow 4-6 hours in between all muscle relaxant doses,  "do not mix with alcohol.   [place] visit on 1/3/24. No clear benefit for pain with daytime products, some benefit at night with products used at bedtime.       Potential procedures:   Bilateral cervical 3,4,5 medial branch nerve block #1 on 6/27/24. She was scheduled for MBB #2 on 9/10/24 but it appears this was not completed.   Will revisit CMBB/RFA plan to target neck pain at follow up.      Other Orders/Referrals:   Recommend using TENS unit a few times throughout the day as needed, particularly when engaging in activities that may increase pain.      Follow up with JEANNIE Schroeder CNP on 10/1/24 at 9:30 am via video visit. This visit will focus on covering additional concerns that were not addressed today due to time constraints.      Additional visit details: Linette sent messages via [employer] prior to today's visit detailing multiple concerns related to poorly managed pain, not clear on provider roles of treatment team. Due to time constraints, we did not have enough time to address all of her concerns. She agreed to scheduling another follow up as an extension of visit to finishing addressing current concerns.     Since her last visit, Radha Beckwith reports:  -This visit today is a continuation from yesterday's visit.   -She called surgeon's office yesterday, was told she had to go to ED, which she notes   -She describes \"bulge\" in surgical area, pain has pattern of worsening throughout day.   -She denies concerns with incision site at this point.   -She reports changes in bowel movements, long and skinny stools. This is different than before surgery.   -She is also getting hiccups more. Never had hiccups before.     -Neck pain is being addressed by MBB/RFA. She was scheduled for block #2 on 9/10, but she had s/s of infection and was not feeling well after surgery.   -She also questions if there were insurance issues with second block, not sure " "if she was supposed to call back.   -She continues to have persistent low back pain, describes painful area above buttocks.   -She had SI joint injections x 2 in the past, benefit with first one and mixed/marginal benefit with second.   -She had consult with Dr. Arteaga and was referred to Dr. Rosario for SI focused therapy.   -She is not following with Spine team and needs to consolidate care for LBP with clinic again.   -She is understanding that it has been a while since we focused on low back and will need to dedicate more attention at next visit. Recommend in person follow up for repeat physical exam.   -She is agreeable to follow up again at my next available in [place].       PEG: A Three-Item Scale Assessing Pain Intensity and Interference    What number best describes your PAIN ON AVERAGE in the past week? 7    What number best describes how, during the past week, pain has interfered with your ENJOYMENT OF LIFE? 10 - Completely interferes    What number best describes how, during the past week, pain has interfered with your GENERAL ACTIVITY? 9    PEG Total Score: 8.67    Fabian EE, Hayden KA, Karie MJ, Anjana TA, Henri J, Joyce JM, Ban SM, Brandie K. Development and initial validation of the PEG, a 3-item scale assessing pain intensity and interference. Journal of General Internal Medicine. 2009 [place];24:733-738.        HPI/Interval history from last visit on 9/30/24:  -She reports pain has gotten much worse. Mood and ADHD has worsened as well.   -She sent a lengthy [employer] message yesterday to clarify my role in her care.   -She has been seen both internally/externally.   -She has engaged in extensive PT over time. She states that she's \"fallen off\" with PT appts for now, continues HEP.   -She notes that it is hard to remember which providers are managing different conditions/areas of pain.   -She asks if chiropractor is a good idea for her.   -She had hernia repair surgery on 8/30/24, concerns for complications " and increased pain. States her friend (who attended ED visit with her after hernia surgery) advised she should have never gone forward with surgery. Friend states that the surgery would never work due to her body type.   -She notes that Suboxone was started 15+ years ago, was on methadone in the past.   -She asks if she should contact surgeon since it has been 1 month since surgery and still having more pain and different symptoms than pre-op.   -She will prepare a list of painful areas/concerns for next visit and will make sure we are clear on plan moving forward.      Members of Care Team/Location:  -External psych/MH counselor, only providers outside the [facility].   -Chiropractor: [facility]. Appointments intermittent, goes in when pain is increased. She notes feeling good when she is walking out of clinic after adjustment, but also thinks pain symptoms may get flared up after adjustments. Tx includes light touch/massage, spinal adjustment.   -Dr. Dozier performed hernia repair surgery. Reports hernia recurrence after surgery, ED visits. Continues to have significant pain after surgery.         Current Pain Treatments:                Suboxone 2-0.5 mg daily (weaning off)              Ropinirole 1 mg at bedtime               Cyclobenzaprine 5-10 mg QD PRN                  Baclofen 20 mg TID              Duloxetine 60 mg BID                 TENS              PT - HEP              CMBB/RFA - MBB #2 needs to be rescheduled        Current MME: N/A     Review of [place] Prescription Monitoring Program (): No concern for abuse or misuse of controlled medications based on this report. Reviewed - appears appropriate.      Past pain treatments:  SI joint injection 5/16/23 -significant benefit x1 month, then diminishing efficacy  KINDRA on 4/19/24 with Dr. Ivory - marginal benefit   Gabapentin       Medications:  Current Outpatient Medications   Medication Sig Dispense Refill    acetaminophen  (TYLENOL) 500 MG tablet Take 1,000 mg by mouth every 6 hours as needed for mild pain      ALPRAZolam (XANAX) 0.5 MG tablet       baclofen (LIORESAL) 20 MG tablet Take 1 tablet (20 mg) by mouth 3 times daily 90 tablet 1    bisacodyl (DULCOLAX) 5 MG EC tablet Take 1 tablet (5 mg) by mouth every other day Take every other day. If no bowel movement for 2 or more days, take 2 tablets of bisacodyl (10 mg) 30 tablet 2    buprenorphine HCl-naloxone HCl (SUBOXONE) 2-0.5 MG per film Place 0.5 Film under the tongue daily       busPIRone HCl (BUSPAR) 30 MG tablet 30 mg 3 times daily      CONSTULOSE 10 GM/15ML solution TAKE 15 MLS (10 G) BY MOUTH 3 TIMES DAILY AS NEEDED FOR CONSTIPATION (AS NEEDED FOR SEVERE CONSTIPAITON, NO BM FOR MORE THAN 3 DAYS AND FEELING CONSTIPATED) 300 mL 3    cyclobenzaprine (FLEXERIL) 5 MG tablet TAKE ONE TO TWO TABLETS BY MOUTH THREE TIMES A DAY AS NEEDED FOR MUSCLE SPASMS 90 tablet 5    DULoxetine (CYMBALTA) 60 MG capsule Take 1 capsule (60 mg) by mouth 2 times daily. 180 capsule 0    famotidine (PEPCID) 40 MG tablet Take 1 tablet (40 mg) by mouth 2 times daily 60 tablet 1    linaclotide (LINZESS) 72 MCG capsule Take 1 capsule (72 mcg) by mouth every morning (before breakfast). 30 capsule 11    medical cannabis (Patient's own supply) See Admin Instructions. Has [place] Medical Cann[place] Card- Purchases through [facility].   (The purpose of this order is to document that the patient reports taking medical cannabis.  This is not a prescription, and is not used to certify that the patient has a qualifying medical condition.)      ondansetron (ZOFRAN ODT) 4 MG ODT tab DISSOLVE ONE TABLET ON TONGUE EVERY 6 HOURS AS NEEDED FOR NAUSEA 20 tablet 1    pantoprazole (PROTONIX) 40 MG EC tablet TAKE ONE TABLET BY MOUTH TWICE A DAY WITH MEALS 60 tablet 5    rOPINIRole (REQUIP) 1 MG tablet TAKE ONE TABLET BY MOUTH EVERY NIGHT AT BEDTIME 90 tablet 0    simvastatin (ZOCOR) 10 MG tablet TAKE ONE TABLET BY  MOUTH EVERY NIGHT AT BEDTIME 90 tablet 1    temazepam (RESTORIL) 15 MG capsule          Medical History: any changes in medical history since they were last seen? No      Objective:    Physical Exam:  GENERAL: alert and no distress  EYES: Eyes grossly normal to inspection.  No discharge or erythema, or obvious scleral/conjunctival abnormalities.  RESP: No audible wheeze, cough, or visible cyanosis.    SKIN: Visible skin clear. No significant rash, abnormal pigmentation or lesions.  NEURO: Cranial nerves grossly intact.  Mentation and speech appropriate for age.  PSYCH: Appropriate affect, tone, and pace of words     Diagnostic Tests/Imaging/Labs:  CMP on 9/5/24 - [facility]    BILLING TIME DOCUMENTATION:   The total TIME spent on this patient on the date of the encounter/appointment was 50 minutes.      TOTAL TIME includes:   Time spent preparing to see the patient (reviewing records and tests)   Time spent face to face (or over the phone) with the patient   Time spent ordering tests, medications, procedures and referrals   Time spent Referring and communicating with other healthcare professionals   Time spent documenting clinical information in Epic

## 2024-10-03 ENCOUNTER — VIRTUAL VISIT (OUTPATIENT)
Dept: PSYCHOLOGY | Facility: CLINIC | Age: 49
End: 2024-10-03
Payer: COMMERCIAL

## 2024-10-03 DIAGNOSIS — F41.1 GENERALIZED ANXIETY DISORDER: ICD-10-CM

## 2024-10-03 DIAGNOSIS — F33.1 MODERATE EPISODE OF RECURRENT MAJOR DEPRESSIVE DISORDER (H): Primary | ICD-10-CM

## 2024-10-03 PROCEDURE — 90837 PSYTX W PT 60 MINUTES: CPT | Mod: 95 | Performed by: COUNSELOR

## 2024-10-03 ASSESSMENT — ANXIETY QUESTIONNAIRES
4. TROUBLE RELAXING: NEARLY EVERY DAY
6. BECOMING EASILY ANNOYED OR IRRITABLE: MORE THAN HALF THE DAYS
7. FEELING AFRAID AS IF SOMETHING AWFUL MIGHT HAPPEN: NEARLY EVERY DAY
2. NOT BEING ABLE TO STOP OR CONTROL WORRYING: NEARLY EVERY DAY
4. TROUBLE RELAXING: NEARLY EVERY DAY
1. FEELING NERVOUS, ANXIOUS, OR ON EDGE: NEARLY EVERY DAY
8. IF YOU CHECKED OFF ANY PROBLEMS, HOW DIFFICULT HAVE THESE MADE IT FOR YOU TO DO YOUR WORK, TAKE CARE OF THINGS AT HOME, OR GET ALONG WITH OTHER PEOPLE?: EXTREMELY DIFFICULT
GAD7 TOTAL SCORE: 20
6. BECOMING EASILY ANNOYED OR IRRITABLE: MORE THAN HALF THE DAYS
1. FEELING NERVOUS, ANXIOUS, OR ON EDGE: NEARLY EVERY DAY
2. NOT BEING ABLE TO STOP OR CONTROL WORRYING: NEARLY EVERY DAY
5. BEING SO RESTLESS THAT IT IS HARD TO SIT STILL: NEARLY EVERY DAY
7. FEELING AFRAID AS IF SOMETHING AWFUL MIGHT HAPPEN: NEARLY EVERY DAY
3. WORRYING TOO MUCH ABOUT DIFFERENT THINGS: NEARLY EVERY DAY
GAD7 TOTAL SCORE: 20
IF YOU CHECKED OFF ANY PROBLEMS ON THIS QUESTIONNAIRE, HOW DIFFICULT HAVE THESE PROBLEMS MADE IT FOR YOU TO DO YOUR WORK, TAKE CARE OF THINGS AT HOME, OR GET ALONG WITH OTHER PEOPLE: EXTREMELY DIFFICULT
8. IF YOU CHECKED OFF ANY PROBLEMS, HOW DIFFICULT HAVE THESE MADE IT FOR YOU TO DO YOUR WORK, TAKE CARE OF THINGS AT HOME, OR GET ALONG WITH OTHER PEOPLE?: EXTREMELY DIFFICULT
3. WORRYING TOO MUCH ABOUT DIFFERENT THINGS: NEARLY EVERY DAY
IF YOU CHECKED OFF ANY PROBLEMS ON THIS QUESTIONNAIRE, HOW DIFFICULT HAVE THESE PROBLEMS MADE IT FOR YOU TO DO YOUR WORK, TAKE CARE OF THINGS AT HOME, OR GET ALONG WITH OTHER PEOPLE: EXTREMELY DIFFICULT
GAD7 TOTAL SCORE: 20
GAD7 TOTAL SCORE: 20
5. BEING SO RESTLESS THAT IT IS HARD TO SIT STILL: NEARLY EVERY DAY

## 2024-10-03 NOTE — PROGRESS NOTES
Answers submitted by the patient for this visit:  Patient Health Questionnaire (Submitted on 10/3/2024)  If you checked off any problems, how difficult have these problems made it for you to do your work, take care of things at home, or get along with other people?: Extremely difficult  PHQ9 TOTAL SCORE: 22  Patient Health Questionnaire (G7) (Submitted on 10/3/2024)  BO 7 TOTAL SCORE: 20        [facility]                                     Progress Note    Patient Name: Radha Beckwith  Date: 10/3/24         Service Type: Individual      Session Start Time: 2:05pm Session End Time: 3:00pm     Session Length:  55    Session #: 53    Attendees: Client    Service Modality:  Video      Provider verified identity through the following two step process.  Patient provided:  Patient is known previously to provider    Telemedicine Visit: The patient's condition can be safely assessed and treated via synchronous audio and visual telemedicine encounter.      Reason for Telemedicine Visit: Patient convenience (e.g. access to timely appointments / distance to available provider)    Originating Site (Patient Location): Patient's home    Distant Site (Provider Location): Provider Remote Setting- Home Office    Consent:  The patient/guardian has verbally consented to: the potential risks and benefits of telemedicine (video visit) versus in person care; bill my insurance or make self-payment for services provided; and responsibility for payment of non-covered services.     Patient would like the video invitation sent by:  My Chart    Mode of Communication:  video    Distant Location (Provider):  On-site    As the provider I attest to compliance with applicable laws and regulations related to telemedicine.    DATA  Interactive Complexity: No  Crisis: No        Progress Since Last Session (Related to Symptoms / Goals / Homework):   Symptoms: No change .    Homework: Completed in session      Episode of Care  Goals: Satisfactory progress - MAINTENANCE (Working to maintain change, with risk of relapse); Intervened by continuing to positively reinforce healthy behavior choice      Current / Ongoing Stressors and Concerns:   The client stated she is not feeling well pain wise and gastro-wise.    Has had several appointments with her team to try to address whatever is going on. Has been instructed to go to the ER if it doesn't get better. Feels worried that the providers are labeling her as drug seeking because she's still on suboxone. Continued to reiterate that she wants to get off of the suboxone badly.   Had a split second suicidal thought when taking some medications the other day Stated she is not suicidal, that was why she answered that question on the PhQ9.   Got a letter of denial for disability. Has 60 days to file for a hearing with a .     Psychiatrist continues to  ween her off of xanex and is going on buspirone.      Treatment Objective(s) Addressed in This Session:   Increase interest, engagement, and pleasure in doing things  Feel less tired and more energy during the day        Intervention:   Talked through her recent experiences with her medical providers and validated her frustrations. Talked through trauma responses and how depressed/hopeless she's feeling with her medical conditions. Client stated she's still having abdomen pain where her surgery was. Having gastrointestinal distress right now and worried about a blockage. Stated she is also not feeling cognitively sharp lately too and her body feels off.  Encouraged client to get medical care if things don't improve.      Assessments completed prior to visit:  The following assessments were completed by patient for this visit:  PHQ9:       8/4/2024     7:19 PM 8/13/2024     5:34 PM 8/18/2024    11:34 AM 8/28/2024     2:05 PM 9/16/2024     3:26 PM 9/23/2024    11:46 AM 10/3/2024     2:00 PM   PHQ-9 SCORE   PHQ-9 Total Score MyChart 21 (Severe  depression) 23 (Severe depression) 22 (Severe depression) 22 (Severe depression) 22 (Severe depression) 25 (Severe depression) 22 (Severe depression)   PHQ-9 Total Score 21 23 22 22 22 25 22     GAD7:       6/12/2024     4:08 PM 7/1/2024     2:51 PM 7/25/2024    11:52 AM 8/13/2024     5:35 PM 8/28/2024     2:06 PM 9/16/2024     3:27 PM 10/3/2024     1:58 PM   BO-7 SCORE   Total Score 21 (severe anxiety) 19 (severe anxiety) 21 (severe anxiety) 19 (severe anxiety) 21 (severe anxiety) 20 (severe anxiety) 20 (severe anxiety)   Total Score 21 19 21 19    19 21 20    20 20    20     PROMIS 10-Global Health (all questions and answers displayed):       4/10/2024     1:58 PM 4/17/2024     1:28 PM 7/25/2024    11:56 AM 8/4/2024     7:20 PM 8/10/2024     2:51 PM 8/18/2024    11:36 AM 8/28/2024     2:07 PM   PROMIS 10   In general, would you say your health is: Poor Poor Poor Poor Fair Poor Poor   In general, would you say your quality of life is: Poor Poor Fair Poor Poor Fair Fair   In general, how would you rate your physical health? Poor Poor Poor Poor Poor Poor Fair   In general, how would you rate your mental health, including your mood and your ability to think? Poor Poor Poor Poor Poor Poor Poor   In general, how would you rate your satisfaction with your social activities and relationships? Poor Poor Poor Fair Poor Fair Fair   In general, please rate how well you carry out your usual social activities and roles Fair Poor Poor Poor Poor Fair Fair   To what extent are you able to carry out your everyday physical activities such as walking, climbing stairs, carrying groceries, or moving a chair? A little A little A little Moderately A little A little A little   In the past 7 days, how often have you been bothered by emotional problems such as feeling anxious, depressed, or irritable? Always Always Always Always Always Always Always   In the past 7 days, how would you rate your fatigue on average? Severe Very severe Severe  Severe Very severe Very severe Severe   In the past 7 days, how would you rate your pain on average, where 0 means no pain, and 10 means worst imaginable pain? 7 7 7 7 7 7 7   In general, would you say your health is: 1 1 1 1 2 1 1   In general, would you say your quality of life is: 1 1 2 1 1 2 2   In general, how would you rate your physical health? 1 1 1 1 1 1 2   In general, how would you rate your mental health, including your mood and your ability to think? 1 1 1 1 1 1 1   In general, how would you rate your satisfaction with your social activities and relationships? 1 1 1 2 1 2 2   In general, please rate how well you carry out your usual social activities and roles. (This includes activities at home, at work and in your community, and responsibilities as a parent, child, spouse, employee, friend, etc.) 2 1 1 1 1 2 2   To what extent are you able to carry out your everyday physical activities such as walking, climbing stairs, carrying groceries, or moving a chair? 2 2 2 3 2 2 2   In the past 7 days, how often have you been bothered by emotional problems such as feeling anxious, depressed, or irritable? 5 5 5 5 5 5 5   In the past 7 days, how would you rate your fatigue on average? 4 5 4 4 5 5 4   In the past 7 days, how would you rate your pain on average, where 0 means no pain, and 10 means worst imaginable pain? 7 7 7 7 7 7 7   Global Mental Health Score 4    4 4    4 5    5 5 4    4 6 6   Global Physical Health Score 7    7 6    6 7    7 8 6    6 6 8   PROMIS TOTAL - SUBSCORES 11    11 10    10 12    12 13 10    10 12 14     [place] Suicide Severity Rating Scale (Lifetime/Recent)      10/4/2022    11:00 AM 5/13/2024     3:13 PM 8/21/2024    10:30 AM 8/30/2024     8:20 AM 8/31/2024    12:07 PM 9/5/2024     5:33 PM 9/13/2024    10:52 AM   [place] Suicide Severity Rating (Lifetime/Recent)   Q1 Wished to be Dead (Past Month) no 0-->no 0-->no 0-->no 0-->no 0-->no 0-->no   Q2 Suicidal Thoughts (Past Month) no  0-->no 0-->no 0-->no 0-->no 0-->no 0-->no   Q3 Suicidal Thought Method no         Q4 Suicidal Intent without Specific Plan no         Q5 Suicide Intent with Specific Plan yes         Q6 Suicide Behavior (Lifetime) yes 0-->no 0-->no 0-->no 0-->no 1-->yes 0-->no   If yes to Q6, within past 3 months? no     0-->no    Level of Risk per Screen high risk no risks indicated no risks indicated no risks indicated no risks indicated moderate risk no risks indicated         ASSESSMENT: Current Emotional / Mental Status (status of significant symptoms):   Risk status (Self / Other harm or suicidal ideation)   Patient denies current fears or concerns for personal safety.   Patient denies current or recent suicidal ideation or behaviors.   Patient denies current or recent homicidal ideation or behaviors.   Patient denies current or recent self injurious behavior or ideation.   Patient denies other safety concerns.   Patient reports there has been no change in risk factors since their last session.     Patient reports there has been no change in protective factors since their last session.     Recommended that patient call 911 or go to the local ED should there be a change in any of these risk factors.     Appearance:   appropriate    Eye Contact:   good    Psychomotor Behavior: Normal    Attitude:   Cooperative    Orientation:   All   Speech    Rate / Production: Normal/ Responsive Normal     Volume:  Normal    Mood:    Normal   Affect:    Appropriate  Tearful   Thought Content:  Clear    Thought Form:  Coherent    Insight:    Good      Medication Review:   No changes to current psychiatric medication(s)     Medication Compliance:   Yes     Changes in Health Issues:   None reported     Chemical Use Review:   Substance Use: Chemical use reviewed, no active concerns identified      Tobacco Use: No change in amount of tobacco use since last session.  Patient declined discussion at this time    Diagnosis:  1. Moderate episode of  recurrent major depressive disorder (H)    2. Generalized anxiety disorder            Collateral Reports Completed:   Not Applicable    PLAN: (Patient Tasks / Therapist Tasks / Other)  Continue to continue to work on self care right now while not feeling well.         Ricarda Bhatia, [facility]                                                         ______________________________________________________________________    Individual Treatment Plan    Patient's Name: Radha Beckwith  YOB: 1975    Date of Creation: 6/28/23  Date Treatment Plan Last Reviewed/Revised: 8/14/24    DSM5 Diagnoses: 296.32 (F33.1) Major Depressive Disorder, Recurrent Episode, Moderate _  Psychosocial / Contextual Factors: living with boyfriend, memory issues  PROMIS (reviewed every 90 days):     Referral / Collaboration:  Referral to another professional/service is not indicated at this time..    Anticipated number of session for this episode of care: 9-12 sessions  Anticipation frequency of session:  as needed  Anticipated Duration of each session: 38-52 minutes  Treatment plan will be reviewed in 90 days or when goals have been changed.       MeasurableTreatment Goal(s) related to diagnosis / functional impairment(s)  Goal 1: Patient will increase communication skills with family members.    Objective #A (Patient Action)    Patient will learn & utilize at least 2 assertive communication skills weekly.  Status: Continued - Date(s): 8/14/24    Intervention(s)  Therapist will teach emotional regulation skills. distress tolerance skills, interpersonal effectiveness skills, emotion regluation skills, mindfulness skills, radical acceptance. Therapist will teach client how to ID body cues for anxiety, anxiety reduction techniques, how to ID triggers for depression and anxiety- decrease reactivity/eliminate, lifestyle changes to reduce depression and anxiety, communication skills, explore cognitive beliefs and help client to develop  healthy cognitive patterns and beliefs.    Objective #B  Patient will use thought-stopping strategy daily to reduce intrusive thoughts.  Status: Continued - Date(s): 8/14/24    Intervention(s)  Therapist will teach emotional regulation skills. distress tolerance skills, interpersonal effectiveness skills, emotion regluation skills, mindfulness skills, radical acceptance. Therapist will teach client how to ID body cues for anxiety, anxiety reduction techniques, how to ID triggers for depression and anxiety- decrease reactivity/eliminate, lifestyle changes to reduce depression and anxiety, communication skills, explore cognitive beliefs and help client to develop healthy cognitive patterns and beliefs.      Goal 2: Patient will decrease depression symptoms.      Objective #A (Patient Action)    Status: Continued - Date(s): 8/14/24    Patient will Increase interest, engagement, and pleasure in doing things  Decrease frequency and intensity of feeling down, depressed, hopeless  Improve quantity and quality of night time sleep / decrease daytime naps  Feel less tired and more energy during the day   Improve diet, appetite, mindful eating, and / or meal planning  Identify negative self-talk and behaviors: challenge core beliefs, myths, and actions  Improve concentration, focus, and mindfulness in daily activities   Feel less fidgety, restless or slow in daily activities / interpersonal interactions.    Intervention(s)  Therapist will teach emotional regulation skills. distress tolerance skills, interpersonal effectiveness skills, emotion regluation skills, mindfulness skills, radical acceptance. Therapist will teach client how to ID body cues for anxiety, anxiety reduction techniques, how to ID triggers for depression and anxiety- decrease reactivity/eliminate, lifestyle changes to reduce depression and anxiety, communication skills, explore cognitive beliefs and help client to develop healthy cognitive patterns and  beliefs.    Objective #B  Patient will identify three distraction and diversion activities and use those activities to decrease level of anxiety  .    Status: Continued - Date(s):  8/14/24    Intervention(s)  Therapist will teach emotional regulation skills. distress tolerance skills, interpersonal effectiveness skills, emotion regluation skills, mindfulness skills, radical acceptance. Therapist will teach client how to ID body cues for anxiety, anxiety reduction techniques, how to ID triggers for depression and anxiety- decrease reactivity/eliminate, lifestyle changes to reduce depression and anxiety, communication skills, explore cognitive beliefs and help client to develop healthy cognitive patterns and beliefs.      Patient has reviewed and agreed to the above plan.      Ricarda Bhatia [facility]

## 2024-10-04 ENCOUNTER — NURSE TRIAGE (OUTPATIENT)
Dept: FAMILY MEDICINE | Facility: CLINIC | Age: 49
End: 2024-10-04

## 2024-10-04 ENCOUNTER — HOSPITAL ENCOUNTER (EMERGENCY)
Facility: CLINIC | Age: 49
Discharge: HOME | End: 2024-10-04
Attending: STUDENT IN AN ORGANIZED HEALTH CARE EDUCATION/TRAINING PROGRAM | Admitting: STUDENT IN AN ORGANIZED HEALTH CARE EDUCATION/TRAINING PROGRAM
Payer: COMMERCIAL

## 2024-10-04 ENCOUNTER — APPOINTMENT (OUTPATIENT)
Dept: CT IMAGING | Facility: CLINIC | Age: 49
End: 2024-10-04
Attending: STUDENT IN AN ORGANIZED HEALTH CARE EDUCATION/TRAINING PROGRAM
Payer: COMMERCIAL

## 2024-10-04 ENCOUNTER — OFFICE VISIT (OUTPATIENT)
Dept: FAMILY MEDICINE | Facility: CLINIC | Age: 49
End: 2024-10-04
Payer: COMMERCIAL

## 2024-10-04 VITALS
OXYGEN SATURATION: 98 % | HEART RATE: 110 BPM | DIASTOLIC BLOOD PRESSURE: 94 MMHG | RESPIRATION RATE: 23 BRPM | SYSTOLIC BLOOD PRESSURE: 127 MMHG | WEIGHT: 160 LBS | TEMPERATURE: 97.7 F | BODY MASS INDEX: 27.31 KG/M2 | HEIGHT: 64 IN

## 2024-10-04 VITALS
BODY MASS INDEX: 26.57 KG/M2 | HEIGHT: 65 IN | SYSTOLIC BLOOD PRESSURE: 127 MMHG | OXYGEN SATURATION: 97 % | WEIGHT: 159.5 LBS | HEART RATE: 104 BPM | RESPIRATION RATE: 18 BRPM | TEMPERATURE: 97.3 F | DIASTOLIC BLOOD PRESSURE: 87 MMHG

## 2024-10-04 DIAGNOSIS — R68.83 CHILLS: Primary | ICD-10-CM

## 2024-10-04 DIAGNOSIS — F11.21 OPIOID DEPENDENCE IN REMISSION (H): ICD-10-CM

## 2024-10-04 DIAGNOSIS — Z87.19 HX OF HERNIA REPAIR: ICD-10-CM

## 2024-10-04 DIAGNOSIS — K21.9 GASTROESOPHAGEAL REFLUX DISEASE WITHOUT ESOPHAGITIS: ICD-10-CM

## 2024-10-04 DIAGNOSIS — G89.18 POSTOPERATIVE PAIN: ICD-10-CM

## 2024-10-04 DIAGNOSIS — K43.0 INCARCERATED INCISIONAL HERNIA: ICD-10-CM

## 2024-10-04 DIAGNOSIS — Z72.0 TOBACCO ABUSE: ICD-10-CM

## 2024-10-04 DIAGNOSIS — Z79.899 MEDICAL CANNABIS USE: ICD-10-CM

## 2024-10-04 DIAGNOSIS — R10.13 EPIGASTRIC PAIN: ICD-10-CM

## 2024-10-04 DIAGNOSIS — Z98.890 HX OF HERNIA REPAIR: ICD-10-CM

## 2024-10-04 DIAGNOSIS — G89.29 CHRONIC NECK PAIN: ICD-10-CM

## 2024-10-04 DIAGNOSIS — M54.2 CHRONIC NECK PAIN: ICD-10-CM

## 2024-10-04 LAB
ALBUMIN SERPL BCG-MCNC: 4.4 G/DL (ref 3.5–5.2)
ALBUMIN UR-MCNC: NEGATIVE MG/DL
ALP SERPL-CCNC: 84 U/L (ref 40–150)
ALT SERPL W P-5'-P-CCNC: 19 U/L (ref 0–50)
ANION GAP SERPL CALCULATED.3IONS-SCNC: 10 MMOL/L (ref 7–15)
APPEARANCE UR: CLEAR
AST SERPL W P-5'-P-CCNC: 19 U/L (ref 0–45)
BASOPHILS # BLD AUTO: 0 10E3/UL (ref 0–0.2)
BASOPHILS NFR BLD AUTO: 0 %
BILIRUB SERPL-MCNC: 0.3 MG/DL
BILIRUB UR QL STRIP: NEGATIVE
BUN SERPL-MCNC: 9.7 MG/DL (ref 6–20)
CALCIUM SERPL-MCNC: 9.9 MG/DL (ref 8.8–10.4)
CHLORIDE SERPL-SCNC: 101 MMOL/L (ref 98–107)
COLOR UR AUTO: YELLOW
CREAT SERPL-MCNC: 0.77 MG/DL (ref 0.51–0.95)
EGFRCR SERPLBLD CKD-EPI 2021: >90 ML/MIN/1.73M2
EOSINOPHIL # BLD AUTO: 0 10E3/UL (ref 0–0.7)
EOSINOPHIL NFR BLD AUTO: 0 %
ERYTHROCYTE [DISTWIDTH] IN BLOOD BY AUTOMATED COUNT: 12.9 % (ref 10–15)
FLUAV AG SPEC QL IA: NEGATIVE
FLUBV AG SPEC QL IA: NEGATIVE
GLUCOSE SERPL-MCNC: 106 MG/DL (ref 70–99)
GLUCOSE UR STRIP-MCNC: NEGATIVE MG/DL
HCO3 SERPL-SCNC: 29 MMOL/L (ref 22–29)
HCT VFR BLD AUTO: 43.4 % (ref 35–47)
HGB BLD-MCNC: 14.6 G/DL (ref 11.7–15.7)
HGB UR QL STRIP: NEGATIVE
IMM GRANULOCYTES # BLD: 0 10E3/UL
IMM GRANULOCYTES NFR BLD: 0 %
KETONES UR STRIP-MCNC: NEGATIVE MG/DL
LEUKOCYTE ESTERASE UR QL STRIP: NEGATIVE
LYMPHOCYTES # BLD AUTO: 2.6 10E3/UL (ref 0.8–5.3)
LYMPHOCYTES NFR BLD AUTO: 28 %
MCH RBC QN AUTO: 29.7 PG (ref 26.5–33)
MCHC RBC AUTO-ENTMCNC: 33.6 G/DL (ref 31.5–36.5)
MCV RBC AUTO: 88 FL (ref 78–100)
MONOCYTES # BLD AUTO: 0.5 10E3/UL (ref 0–1.3)
MONOCYTES NFR BLD AUTO: 5 %
NEUTROPHILS # BLD AUTO: 6.1 10E3/UL (ref 1.6–8.3)
NEUTROPHILS NFR BLD AUTO: 66 %
NITRATE UR QL: NEGATIVE
NRBC # BLD AUTO: 0 10E3/UL
NRBC BLD AUTO-RTO: 0 /100
PH UR STRIP: 5 [PH] (ref 5–7)
PLATELET # BLD AUTO: 220 10E3/UL (ref 150–450)
POTASSIUM SERPL-SCNC: 4.3 MMOL/L (ref 3.4–5.3)
PROT SERPL-MCNC: 7.6 G/DL (ref 6.4–8.3)
RBC # BLD AUTO: 4.91 10E6/UL (ref 3.8–5.2)
SARS-COV-2 RNA RESP QL NAA+PROBE: NEGATIVE
SODIUM SERPL-SCNC: 140 MMOL/L (ref 135–145)
SP GR UR STRIP: 1.01 (ref 1–1.03)
UROBILINOGEN UR STRIP-MCNC: NORMAL MG/DL
WBC # BLD AUTO: 9.3 10E3/UL (ref 4–11)

## 2024-10-04 PROCEDURE — 99285 EMERGENCY DEPT VISIT HI MDM: CPT | Mod: 25 | Performed by: STUDENT IN AN ORGANIZED HEALTH CARE EDUCATION/TRAINING PROGRAM

## 2024-10-04 PROCEDURE — 250N000013 HC RX MED GY IP 250 OP 250 PS 637: Performed by: STUDENT IN AN ORGANIZED HEALTH CARE EDUCATION/TRAINING PROGRAM

## 2024-10-04 PROCEDURE — 74177 CT ABD & PELVIS W/CONTRAST: CPT

## 2024-10-04 PROCEDURE — 36415 COLL VENOUS BLD VENIPUNCTURE: CPT | Performed by: STUDENT IN AN ORGANIZED HEALTH CARE EDUCATION/TRAINING PROGRAM

## 2024-10-04 PROCEDURE — 99214 OFFICE O/P EST MOD 30 MIN: CPT | Performed by: STUDENT IN AN ORGANIZED HEALTH CARE EDUCATION/TRAINING PROGRAM

## 2024-10-04 PROCEDURE — 99283 EMERGENCY DEPT VISIT LOW MDM: CPT | Performed by: STUDENT IN AN ORGANIZED HEALTH CARE EDUCATION/TRAINING PROGRAM

## 2024-10-04 PROCEDURE — 87635 SARS-COV-2 COVID-19 AMP PRB: CPT | Performed by: STUDENT IN AN ORGANIZED HEALTH CARE EDUCATION/TRAINING PROGRAM

## 2024-10-04 PROCEDURE — 80053 COMPREHEN METABOLIC PANEL: CPT | Performed by: STUDENT IN AN ORGANIZED HEALTH CARE EDUCATION/TRAINING PROGRAM

## 2024-10-04 PROCEDURE — 87804 INFLUENZA ASSAY W/OPTIC: CPT | Performed by: STUDENT IN AN ORGANIZED HEALTH CARE EDUCATION/TRAINING PROGRAM

## 2024-10-04 PROCEDURE — 81003 URINALYSIS AUTO W/O SCOPE: CPT | Performed by: STUDENT IN AN ORGANIZED HEALTH CARE EDUCATION/TRAINING PROGRAM

## 2024-10-04 PROCEDURE — 93000 ELECTROCARDIOGRAM COMPLETE: CPT | Performed by: STUDENT IN AN ORGANIZED HEALTH CARE EDUCATION/TRAINING PROGRAM

## 2024-10-04 PROCEDURE — 250N000011 HC RX IP 250 OP 636: Performed by: STUDENT IN AN ORGANIZED HEALTH CARE EDUCATION/TRAINING PROGRAM

## 2024-10-04 PROCEDURE — 250N000009 HC RX 250: Performed by: STUDENT IN AN ORGANIZED HEALTH CARE EDUCATION/TRAINING PROGRAM

## 2024-10-04 PROCEDURE — 85025 COMPLETE CBC W/AUTO DIFF WBC: CPT | Performed by: STUDENT IN AN ORGANIZED HEALTH CARE EDUCATION/TRAINING PROGRAM

## 2024-10-04 RX ORDER — OXYCODONE HYDROCHLORIDE 5 MG/1
5 TABLET ORAL ONCE
Status: COMPLETED | OUTPATIENT
Start: 2024-10-04 | End: 2024-10-04

## 2024-10-04 RX ORDER — IOPAMIDOL 755 MG/ML
500 INJECTION, SOLUTION INTRAVASCULAR ONCE
Status: DISCONTINUED | OUTPATIENT
Start: 2024-10-04 | End: 2024-10-04

## 2024-10-04 RX ORDER — IOPAMIDOL 755 MG/ML
500 INJECTION, SOLUTION INTRAVASCULAR ONCE
Status: COMPLETED | OUTPATIENT
Start: 2024-10-04 | End: 2024-10-04

## 2024-10-04 RX ADMIN — OXYCODONE HYDROCHLORIDE 5 MG: 5 TABLET ORAL at 18:21

## 2024-10-04 RX ADMIN — SODIUM CHLORIDE 60 ML: 9 INJECTION, SOLUTION INTRAVENOUS at 18:35

## 2024-10-04 RX ADMIN — IOPAMIDOL 78 ML: 755 INJECTION, SOLUTION INTRAVENOUS at 18:35

## 2024-10-04 ASSESSMENT — ENCOUNTER SYMPTOMS: DIARRHEA: 1

## 2024-10-04 ASSESSMENT — ACTIVITIES OF DAILY LIVING (ADL)
ADLS_ACUITY_SCORE: 36
ADLS_ACUITY_SCORE: 36
ADLS_ACUITY_SCORE: 34

## 2024-10-04 ASSESSMENT — PAIN SCALES - GENERAL: PAINLEVEL: SEVERE PAIN (7)

## 2024-10-04 NOTE — ED PROVIDER NOTES
History     Chief Complaint   Patient presents with    Post-op Problem     HPI  Radha Beckwith is a 48 year old female who presents to the emergency department for continued epigastric pain after hernia surgery roughly a month ago.  She tried her Suboxone and Tylenol today.  The pain is described as sharp and located in the epigastrium and does not seem to radiate and unchanged from previous.    Allergies:  Allergies   Allergen Reactions    Compazine Anxiety and Palpitations     Severe anxiety attack    Droperidol Anxiety and Palpitations     Severe anxiety attack    Nubain [Nalbuphine Hcl] Anxiety and Palpitations     Severe anxiety attack    Ergotamine-Caffeine      12-            GI problems-    Seasonal Allergies     Sumatriptan      vomits after giving herself a shot    Prochlorperazine Anxiety and Palpitations     Uncontrolled movement, severe anxiety attack       Problem List:    Patient Active Problem List    Diagnosis Date Noted    Cervicalgia 04/02/2024     Priority: Medium    Cervical radiculopathy 02/14/2024     Priority: Medium    Chronic bilateral thoracic back pain 02/14/2024     Priority: Medium    Lumbar radiculopathy 02/14/2024     Priority: Medium    Pain of both sacroiliac joints 02/14/2024     Priority: Medium    Radicular pain of upper extremity 02/14/2024     Priority: Medium    Somatic dysfunction of pelvic region 01/10/2024     Priority: Medium    Myofascial pain 01/10/2024     Priority: Medium    Generalized anxiety disorder 11/27/2023     Priority: Medium    Chronic low back pain with bilateral sciatica 06/15/2023     Priority: Medium    Chronic neck pain 06/15/2023     Priority: Medium    Moderate episode of recurrent major depressive disorder (H) 06/29/2022     Priority: Medium    Supraventricular tachycardia (H) 06/03/2019     Priority: Medium    Psychophysiological insomnia 12/30/2017     Priority: Medium    Chronic bilateral low back pain without sciatica 12/30/2017      Priority: Medium    Opioid dependence in remission (H) 08/02/2015     Priority: Medium     On suboxone treatement with Bryant Harkins.  (Noted in 2015).        Anxiety attack 07/31/2015     Priority: Medium    Hypokalemia 06/23/2015     Priority: Medium    Tobacco abuse 06/23/2015     Priority: Medium    Hyperlipidemia LDL goal <100 01/29/2014     Priority: Medium    GERD (gastroesophageal reflux disease) 07/28/2010     Priority: Medium     Takes omeprazole and metoclopramide      Restless legs 07/28/2010     Priority: Medium        Past Medical History:    Past Medical History:   Diagnosis Date    Abdominal pain, right lower quadrant 03/09/2008    Anxiety attack 07/31/2015    Atypical chest pain 06/23/2015    De Quervain's disease (tenosynovitis)     Dehydration     Depressive disorder 1996    Gastric ulcer 07/31/2015    GERD (gastroesophageal reflux disease) 07/28/2010    Hypertension 2017    Ingrowing nail 01/09/2014    Migraines     Opioid dependence in remission (H) 08/02/2015    Other and unspecified ovarian cyst     Papanicolaou smear of cervix with low grade squamous intraepithelial lesion (LGSIL) 07/07/2017       Past Surgical History:    Past Surgical History:   Procedure Laterality Date    BIOPSY CERVICAL, LOCAL EXCISION, SINGLE/MULTIPLE N/A 8/10/2017    Procedure: BIOPSY CERVICAL, LOCAL EXCISION, SINGLE/MULTIPLE;;  Surgeon: Michael Chandler MD;  [facility]    COLONOSCOPY N/A 1/6/2023    Procedure: COLONOSCOPY;  Surgeon: Michael Dozier MD;  [facility]: [facility]    COLPOSCOPY, BIOPSY, COMBINED N/A 8/10/2017    Procedure: COMBINED COLPOSCOPY, BIOPSY;  Colposcopy with Cervical Biopsies and Endometrial Biopsy, Exam with Ultrasound;  Surgeon: Michael Chandler MD;  [facility]    ESOPHAGOSCOPY, GASTROSCOPY, DUODENOSCOPY (EGD), COMBINED N/A 4/17/2017    Procedure: COMBINED ESOPHAGOSCOPY, GASTROSCOPY, DUODENOSCOPY (EGD);  Surgeon: Ibrahima Esposito MD;  [facility]     ESOPHAGOSCOPY, GASTROSCOPY, DUODENOSCOPY (EGD), COMBINED N/A 1/6/2023    Procedure: ESOPHAGOGASTRODUODENOSCOPY, WITH BIOPSY;  Surgeon: Michael Dozire MD;  [facility]    ESOPHAGOSCOPY, GASTROSCOPY, DUODENOSCOPY (EGD), COMBINED N/A 10/3/2023    Procedure: ESOPHAGOGASTRODUODENOSCOPY, WITH BIOPSY;  Surgeon: John Paul Varela MD;  [facility]    EXAM UNDER ANESTHESIA PELVIC N/A 8/10/2017    Procedure: EXAM UNDER ANESTHESIA PELVIC;;  Surgeon: Michael Chandler MD;  [facility]    HERNIORRHAPHY, INCISIONAL, ROBOT-ASSISTED, LAPAROSCOPIC, USING DA JERRELL XI N/A 8/30/2024    Procedure: HERNIORRHAPHY, INCISIONAL, ROBOT-ASSISTED, LAPAROSCOPIC, USING DA JERRELL XI;  Surgeon: Michael Dozier MD;  [facility]    HYSTERECTOMY      HYSTERECTOMY, PAP NO LONGER INDICATED      INJECT EPIDURAL CERVICAL N/A 10/20/2023    Procedure: Cervical 6-7 Interlaminar epidural steroid injection using fluoroscopic guidance with contrast dye.;  Surgeon: Trevor Ivory MD;  [facility]    INJECT EPIDURAL CERVICAL N/A 4/19/2024    Procedure: Cervical 6 - Cervical 7 Interlaminar epidural steroid injection using fluoroscopic guidance with contrast dye.;  Surgeon: Trevor Ivory MD;  [facility]    INJECT EPIDURAL LUMBAR Bilateral 7/1/2022    Procedure: Lumbar 5-Sacral 1 Transforaminal Epidural Steroid Injection with fluoroscopic guidance and contrast, bilateral;  Surgeon: Trevor Ivory MD;  [facility]    LAPAROSCOPIC CHOLECYSTECTOMY N/A 2/2/2018    Procedure: LAPAROSCOPIC CHOLECYSTECTOMY;  Laparoscopic Cholecystectomy;  Surgeon: Tigre Lowry DO;  [facility]    LAPAROSCOPIC HYSTERECTOMY TOTAL N/A 10/30/2017    Procedure: LAPAROSCOPIC HYSTERECTOMY TOTAL;  LAPAROSCOPIC HYSTERECTOMY TOTAL POSSIBLE SALPINGO-OOPHERECTOMY (BILATERAL);  Surgeon: Michael Chandler MD;  Location: [facility]    ZZHC UGI ENDOSCOPY, SIMPLE EXAM  01/07/08       Family History:    Family History   Problem Relation Age  of Onset    Depression Mother     Respiratory Mother     Chronic Obstructive Pulmonary Disease Mother     Hypertension Mother     Anxiety Disorder Mother     Cerebrovascular Disease Father         First stroke at age 28,  from 2nd when he was 55. Brain aneurysms.    Breast Cancer Cousin     Adrenal Disorder Other     Chronic Obstructive Pulmonary Disease Other     Asthma Brother        Social History:  Marital Status:  Single [1]  Social History     Tobacco Use    Smoking status: Every Day     Current packs/day: 0.25     Average packs/day: 0.3 packs/day for 20.0 years (5.0 ttl pk-yrs)     Types: Cigarettes     Passive exposure: Never    Smokeless tobacco: Never   Vaping Use    Vaping status: Former    Substances: Nicotine, CBD    Devices: Co[employer]   Substance Use Topics    Alcohol use: Yes     Comment: Occasionaly    Drug use: No        Medications:    acetaminophen (TYLENOL) 500 MG tablet  ALPRAZolam (XANAX) 0.5 MG tablet  baclofen (LIORESAL) 20 MG tablet  bisacodyl (DULCOLAX) 5 MG EC tablet  buprenorphine HCl-naloxone HCl (SUBOXONE) 2-0.5 MG per film  busPIRone HCl (BUSPAR) 30 MG tablet  CONSTULOSE 10 GM/15ML solution  cyclobenzaprine (FLEXERIL) 5 MG tablet  DULoxetine (CYMBALTA) 60 MG capsule  famotidine (PEPCID) 40 MG tablet  linaclotide (LINZESS) 72 MCG capsule  medical cannabis (Patient's own supply)  ondansetron (ZOFRAN ODT) 4 MG ODT tab  pantoprazole (PROTONIX) 40 MG EC tablet  rOPINIRole (REQUIP) 1 MG tablet  simvastatin (ZOCOR) 10 MG tablet  temazepam (RESTORIL) 15 MG capsule          Review of Systems  Gen: No fevers, no unintentional weight changes  Head and neck: No headache, no neck pain  Eye: No eye pain, no vision changes  Respiratory: No shortness of breath, no cough  Cardiovascular: No chest pain, no palpitations  Abdominal: No vomiting, no constipation, no diarrhea  Urinary: No dysuria, no hematuria  Musculoskeletal: No joint redness, no trauma  Neurologic: No confusion, no weakness, no  "sensation changes  Psychiatric: No suicidal ideation, no homicidal ideation    Physical Exam   BP: (!) 127/94  Pulse: 110  Temp: 97.7  F (36.5  C)  Resp: 23  Height: 162.6 cm (5' 4\")  Weight: 72.6 kg (160 lb)  SpO2: 98 %      Physical Exam  General: Alert, awake, no distress  Head: Normocephalic, atraumatic  Eyes: Grossly intact extraocular movements, conjunctiva clear, pupils equal   Mouth: Mucous membranes moist  Neck: Supple, grossly full range of motion, no observable masses  Respiratory: No distress, speaks in full sentences, clear to auscultation bilaterally  Cardiac: S1 and S2 cardiac sounds appreciated, normal rate, no edema  Abdominal: Soft, not distended, mild tenderness in the epigastrium, previous incisions well-healed  Neurologic: Normal level of consciousness, speech is clear and appropriate, moving all extremities grossly normal and symmetric  Skin: No gross rashes or lesions  Psych: Normal mood and appropriate for situation        ED Course        Procedures                  Results for orders placed or performed during the hospital encounter of 10/04/24 (from the past 24 hour(s))   CT ABDOMEN PELVIS W CONTRAST    Narrative    EXAM: CT ABDOMEN PELVIS W CONTRAST  LOCATION: [facility]  DATE: 10/4/2024    INDICATION: Incisional pain epigastric.  COMPARISON: 9/13/2024.  TECHNIQUE: CT scan of the abdomen and pelvis was performed following injection of IV contrast. Multiplanar reformats were obtained. Dose reduction techniques were used.  CONTRAST: 78 mL Isovue-370.    FINDINGS:   LOWER CHEST: Normal.    HEPATOBILIARY: Cholecystectomy.    PANCREAS: Normal.    SPLEEN: Calcified granuloma.    ADRENAL GLANDS: Normal.    KIDNEYS/BLADDER: Normal.    BOWEL: Previous upper midline ventral abdominal hernia repair with continued decrease in size of well-defined fatty nodule at the repair site probably fatty necrosis. Minimal amount of soft tissue surrounding infiltration.    LYMPH " NODES: Normal.    VASCULATURE: Normal.    PELVIC ORGANS: Hysterectomy.    MUSCULOSKELETAL: Normal.      Impression    IMPRESSION:   1.  Previous upper midline ventral abdominal hernia repair. Decreased size of the well-defined fatty density nodule at the hernia repair site now measuring 1.6 cm. This is probably evolving fat necrosis. There is a small amount of soft tissue   infiltration. No residual hernia.    2.  Cholecystectomy and hysterectomy.     *Note: Due to a large number of results and/or encounters for the requested time period, some results have not been displayed. A complete set of results can be found in Results Review.       Medications   oxyCODONE (ROXICODONE) tablet 5 mg (5 mg Oral $Given 10/4/24 1821)   sodium chloride 0.9 % bag 100ml for CT scan flush use (60 mLs As instructed $Given 10/4/24 1835)   iopamidol (ISOVUE-370) solution 500 mL (78 mLs Intravenous $Given 10/4/24 1835)       Assessments & Plan (with Medical Decision Making)     Mildly tachycardic, rest of vitals are reassuring.  Will give her a dose of oral oxycodone.  Reviewed blood work from her primary doctor a few hours prior to ER arrival.    Patient notes she feels better during her time in the ER after oxycodone was given.CT abdomen pelvis with contrast was performed today during her time in the ER.  Shows previous abdominal hernia was repaired, decreased size of a well-defined fatty density nodule in the hernia repair site now 1.6 cm, probably evolving fat necrosis, no other acute pathology appreciated.  Discussed these findings and the patient presentation with the surgical team.  They are reassured and feels like the patient can be discharged and follow-up as an outpatient.  The recommendations include following up with pain management.  I did place a pain management referral.  She notes frustrations dealing with her persistent pain after the procedure trying to follow-up with the surgical clinic and pain management, we did give  her patient advocate card to help try to bridge the gap between these 2 clinics to hopefully control her pain better.  Patient was reassured that we have ruled out emergent pathology tonight and we are always available for reevaluation if she decides to return.  Encouraged scheduling Tylenol throughout the day, she is hesitant to taking NSAIDs because of her gastric reflux: Discussed that she could temporarily take over-the-counter antacids and use ibuprofen.  Also encouraged possible heat and cold packs to the area.  Does not appear that she has an etiology requiring outpatient opiates at this point.    Discussed todays ER visit and current agreed upon treatment plan. Education provided and all questions answered. Instructed to follow up with pcp to discuss ER visit, make sure they are doing well, and to follow up on any incidental findings. Encouraged to come back to the ER for re evaluation if worsening or any other concerns.    I have reviewed the nursing notes.    I have reviewed the findings, diagnosis, plan and need for follow up with the patient.          New Prescriptions    No medications on file       Final diagnoses:   Epigastric pain       10/4/2024   [facility] EMERGENCY DEPT       Emerson Alejandro MD  10/04/24 1940

## 2024-10-04 NOTE — TELEPHONE ENCOUNTER
S-(situation): Patient is calling and stated she has been having diarrhea since Wednesday (2 days ago).  She stated she has chronic constipation and will take constulose as needed, and had to take and extra 3 bisacodyl on Wednesday along with the constulose.  She stated she just doesn't feel right.  Patient stated she would like to make sure she is ok by scheduling an office visit with any provider.     B-(background): Patient verbalized that she had hernia surgery on August 30, 2024 and is concerned she may be having complications from her hernia repair, or may be disturbing her hernia repair with the diarrhea.      A-(assessment): Patient may have viral infection, or may be reacting to the increased medication of costulose and bisacodyl.    R-(recommendations): Per RN protocol, patient to be seen in office today.  Scheduled patient today in a triage open slot, informing patient to arrive to the clinic 20 minutes prior to scheduled visit to check in at the .  Advised patient to seek urgent/ emergency care for worsening symptoms per protocol.  Patient stated understanding.    Reason for Disposition   MODERATE diarrhea (e.g., 4-6 times / day more than normal) and present > 48 hours (2 days)    Additional Information   Negative: Shock suspected (e.g., cold/pale/clammy skin, too weak to stand, low BP, rapid pulse)   Negative: Difficult to awaken or acting confused (e.g., disoriented, slurred speech)   Negative: Sounds like a life-threatening emergency to the triager   Negative: Vomiting also present and worse than the diarrhea   Negative: Blood in stool and without diarrhea   Negative: SEVERE abdominal pain (e.g., excruciating) and present > 1 hour   Negative: SEVERE abdominal pain and age > 60 years   Negative: Bloody, black, or tarry bowel movements  (Exception: Chronic-unchanged black-grey bowel movements and is taking iron pills or Pepto-Bismol.)   Negative: SEVERE diarrhea (e.g., 7 or more times / day  "more than normal) and age > 60 years   Negative: Constant abdominal pain lasting > 2 hours   Negative: Drinking very little and dehydration suspected (e.g., no urine > 12 hours, very dry mouth, very lightheaded)   Negative: Patient sounds very sick or weak to the triager   Negative: SEVERE diarrhea (e.g., 7 or more times / day more than normal) and present > 24 hours (1 day)    Answer Assessment - Initial Assessment Questions  1. DIARRHEA SEVERITY: \"How bad is the diarrhea?\" \"How many more stools have you had in the past 24 hours than normal?\"     - NO DIARRHEA (SCALE 0)    - MILD (SCALE 1-3): Few loose or mushy BMs; increase of 1-3 stools over normal daily number of stools; mild increase in ostomy output.    -  MODERATE (SCALE 4-7): Increase of 4-6 stools daily over normal; moderate increase in ostomy output.    -  SEVERE (SCALE 8-10; OR \"WORST POSSIBLE\"): Increase of 7 or more stools daily over normal; moderate increase in ostomy output; incontinence.      Moderate    2. ONSET: \"When did the diarrhea begin?\"       Took Contulose - takes when feeling constipated, added 3 bisacodyl Wednesday (2 days ago)    3. BM CONSISTENCY: \"How loose or watery is the diarrhea?\"       Watery    4. VOMITING: \"Are you also vomiting?\" If Yes, ask: \"How many times in the past 24 hours?\"       No    5. ABDOMEN PAIN: \"Are you having any abdomen pain?\" If Yes, ask: \"What does it feel like?\" (e.g., crampy, dull, intermittent, constant)       Some    6. ABDOMEN PAIN SEVERITY: If present, ask: \"How bad is the pain?\"  (e.g., Scale 1-10; mild, moderate, or severe)    - MILD (1-3): doesn't interfere with normal activities, abdomen soft and not tender to touch     - MODERATE (4-7): interferes with normal activities or awakens from sleep, abdomen tender to touch     - SEVERE (8-10): excruciating pain, doubled over, unable to do any normal activities        Mild    7. ORAL INTAKE: If vomiting, \"Have you been able to drink liquids?\" \"How much " "liquids have you had in the past 24 hours?\"      Been able to keep water down    8. HYDRATION: \"Any signs of dehydration?\" (e.g., dry mouth [not just dry lips], too weak to stand, dizziness, new weight loss) \"When did you last urinate?\"      Some dizziness when standing up -     9. EXPOSURE: \"Have you traveled to a foreign country recently?\" \"Have you been exposed to anyone with diarrhea?\" \"Could you have eaten any food that was spoiled?\"      No    10. ANTIBIOTIC USE: \"Are you taking antibiotics now or have you taken antibiotics in the past 2 months?\"        No - surgery for hernia in August 30, 2024    11. OTHER SYMPTOMS: \"Do you have any other symptoms?\" (e.g., fever, blood in stool)        Fever 99.2 F    12. PREGNANCY: \"Is there any chance you are pregnant?\" \"When was your last menstrual period?\"        Unknown    Protocols used: Diarrhea-A-[place]  Renea Gagnon RN    "

## 2024-10-04 NOTE — ED TRIAGE NOTES
PT presents with c/o post-op pain, rates it as 8/10 pain score. PT reports that  she had herniorrhaphy done August 30, 2024.

## 2024-10-04 NOTE — PROGRESS NOTES
Assessment & Plan   Problem List Items Addressed This Visit          Nervous and Auditory    Chronic neck pain       Digestive    GERD (gastroesophageal reflux disease)       Behavioral    Tobacco abuse       Other    Opioid dependence in remission (H)     Other Visit Diagnoses       Chills    -  Primary    Relevant Orders    UA Macroscopic with reflex to Microscopic and Culture - Lab Collect (Completed)    CBC with platelets and differential (Completed)    Comprehensive metabolic panel (BMP + Alb, Alk Phos, ALT, AST, Total. Bili, TP) (Completed)    EKG 12-lead complete w/read - Clinics (Completed)    Influenza A/B antigen (Completed)    Symptomatic COVID-19 Virus (Coronavirus) by PCR Nose    Postoperative pain        Hx of hernia repair        Incarcerated incisional hernia        Medical cannabis use               Patient here today with ongoing postsurgical pain that does not seem to change.  She has already had 2 Cts and MRI of her ovaries in the last month.  She has no other respiratory or cardiac symptoms that would be concerning for pulmonary or cardiac etiologies contributing.  EKG today as expected.  Given her vague chills and malaise I did add on lab work today without abnormal blood counts. This was reassuring looking for further signs of infection. Also added on COVID and influenza testing.  Symptoms also somewhat concerning for opioid withdrawal and we did discuss this today but she has been on a stable dose of Suboxone.  Reports no further opioid use other than her Suboxone since ER visit.  Also tells me she tried taking more than one suboxone to help with the pain but did not change. She does not feel she is having withdrawls. She can not tolerate NSAIDs. She is requesting something to help with her pain.today.  Would not recommend further oxycodone given her history of constipation and hx of opiate use. Pain seems unchanged. Would be willing to increase her suboxone to rule out opioid withdrawal  "and help with pain but she is not wanting this today and tells me she has plenty available and not needing a new prescription. I did discuss the need to discuss with her suboxone provider before using more than prescribed on her own. Instructed her follow up with her surgeon and be seen sooner if new or worsening symptoms arise and to consider further imaging. When I called patient with lab results she was already on way to the ER with worsening pain.      I explained my diagnostic considerations and recommendations to the patient, who voiced understanding and agreement with the assessment and treatment plan. All questions were answered to patient's apparent satisfaction.     BMI  Estimated body mass index is 26.57 kg/m  as calculated from the following:    Height as of this encounter: 1.65 m (5' 4.96\").    Weight as of this encounter: 72.3 kg (159 lb 8 oz).   Weight management plan: Discussed healthy diet and exercise guidelines        Ventura Sue is a 48 year old, presenting for the following health issues:  Diarrhea, Confusion, Shaking, Tingling, and Headache        10/4/2024     1:34 PM   Additional Questions   Roomed by Zachary     History of Present Illness       Reason for visit:  Pain and not feeling good  Symptom onset:  3-7 days ago  Symptoms include:  Pain shaky low-high temp confusion tingly  Symptom intensity:  Moderate  Symptom progression:  Worsening  Had these symptoms before:  No  What makes it worse:  Doing things  What makes it better:  Heating pad when cold   She is taking medications regularly.     Patient with ongoing epigastric pain near incision site after hernia repair over a month ago.  Has been seen in ER twice with continued pain as well as other nonspecific symptoms.  With mild ileum inflammation.  Some fat necrosis and postsurgical changes noted on that CT. Also had surgical device on the CT prior to that working with GI for her GERD.  Notes the last 3 to 4-day where she has not " "felt like her self. Pain is persistent and she had diarrhea after doing constulose and dulcolax because she was not having a bowl movement for 3-4 days prior.  No new urinary symptoms.  No fevers but has felt chills.  Thinks her heart rate was a little higher.  No chest pain or palpitations.  No other respiratory symptoms or congestion.  No sick contact she is aware of.  Feeling rundown and did take a COVID test at home which was negative.  She is wanting more answers on her pain today.  Does use small dose of Suboxone recently increased to 2 mg and also follows with pain management for her neck.  Has been on low-dose Suboxone for many years after being on methadone for migraines.  Also following with psychology regularly.  Also notes shakiness at times.  Reports no abuse of her Suboxone or other opiates.  Uses medical marijuana but no other illicit drugs.  No alcohol.    Review of Systems  Constitutional, HEENT, cardiovascular, pulmonary, GI, , musculoskeletal, neuro, skin, endocrine and psych systems are negative, except as otherwise noted.      Objective    /87 (BP Location: Right arm, Patient Position: Sitting, Cuff Size: Adult Regular)   Pulse 104   Temp 97.3  F (36.3  C) (Temporal)   Resp 18   Ht 1.65 m (5' 4.96\")   Wt 72.3 kg (159 lb 8 oz)   LMP  (LMP Unknown)   SpO2 97%   BMI 26.57 kg/m    Body mass index is 26.57 kg/m .  Physical Exam   GENERAL: alert and no distress  EYES: Eyes grossly normal to inspection, PERRL and conjunctivae and sclerae normal  HENT: ear canals and TM's normal, nose and mouth without ulcers or lesions  NECK: no adenopathy, no asymmetry, masses, or scars  RESP: lungs clear to auscultation - no rales, rhonchi or wheezes  CV: regular rate and rhythm, normal S1 S2, no S3 or S4, no murmur, click or rub, no peripheral edema  ABDOMEN: soft, epigastric tenderness with nodularity near incision, tender on site and general epigastric area, no hepatosplenomegaly, no masses and " bowel sounds normal, no franco's   MS: no gross musculoskeletal defects noted, no edema  SKIN: no suspicious lesions or rashes  NEURO:  mentation intact and speech normal  PSYCH: mentation appears normal, affect normal/bright          EKG: Sinus rhythm , no acute ST changes.     Signed Electronically by: Renny Davis MD

## 2024-10-05 NOTE — DISCHARGE INSTRUCTIONS
Your workup in the ER today is reassuring.  There is small  area of inflamed tissue that is not infection at the site of the surgery, I talked with the surgical team and they feel like this will slowly improve.  The recommendation is to follow-up with a pain management clinic, they can help with prolonged pain after surgery.  I did put in a consult for them today.  Please use the patient relations card that we provided tonight to help with the frustrations and bridge the gap between the surgical clinic and the pain management clinic in regard to this continued pain after surgery.  We are glad that we are able to help rule out any emergencies tonight.    Please call your primary doctor in the morning to discuss your ER visit, make sure you are doing well, and to follow up on any incidental findings. Please come back to the ER for re evaluation if you feel like things are getting worse or have other concerns.

## 2024-10-07 ENCOUNTER — VIRTUAL VISIT (OUTPATIENT)
Dept: PSYCHOLOGY | Facility: CLINIC | Age: 49
End: 2024-10-07
Payer: COMMERCIAL

## 2024-10-07 DIAGNOSIS — F41.1 GENERALIZED ANXIETY DISORDER: ICD-10-CM

## 2024-10-07 DIAGNOSIS — F33.1 MODERATE EPISODE OF RECURRENT MAJOR DEPRESSIVE DISORDER (H): Primary | ICD-10-CM

## 2024-10-07 PROCEDURE — 90837 PSYTX W PT 60 MINUTES: CPT | Mod: 95 | Performed by: COUNSELOR

## 2024-10-07 NOTE — PROGRESS NOTES
Answers submitted by the patient for this visit:  Patient Health Questionnaire (Submitted on 10/7/2024)  If you checked off any problems, how difficult have these problems made it for you to do your work, take care of things at home, or get along with other people?: Extremely difficult  PHQ9 TOTAL SCORE: 24  Patient Health Questionnaire (G7) (Submitted on 10/3/2024)  BO 7 TOTAL SCORE: 20        [facility]                                     Progress Note    Patient Name: Radha Beckwith  Date: 10/7/24         Service Type: Individual      Session Start Time: 3:35pm Session End Time:  4:30pm     Session Length:  55    Session #: 54    Attendees: Client    Service Modality:  Video      Provider verified identity through the following two step process.  Patient provided:  Patient is known previously to provider    Telemedicine Visit: The patient's condition can be safely assessed and treated via synchronous audio and visual telemedicine encounter.      Reason for Telemedicine Visit: Patient convenience (e.g. access to timely appointments / distance to available provider)    Originating Site (Patient Location): Patient's home    Distant Site (Provider Location): Provider Remote Setting- Home Office    Consent:  The patient/guardian has verbally consented to: the potential risks and benefits of telemedicine (video visit) versus in person care; bill my insurance or make self-payment for services provided; and responsibility for payment of non-covered services.     Patient would like the video invitation sent by:  My Chart    Mode of Communication:  video    Distant Location (Provider):  On-site    As the provider I attest to compliance with applicable laws and regulations related to telemedicine.    DATA  Interactive Complexity: No  Crisis: No        Progress Since Last Session (Related to Symptoms / Goals / Homework):   Symptoms: No change .    Homework: Completed in session      Episode of Care  Goals: Satisfactory progress - MAINTENANCE (Working to maintain change, with risk of relapse); Intervened by continuing to positively reinforce healthy behavior choice      Current / Ongoing Stressors and Concerns:   The client stated she's had a busy day. Stated she felt awful this morning. Woke up in so much pain.   Went to the doctor recently and stated the pain she's experiencing they are considering it chronic pain after the surgery.      Treatment Objective(s) Addressed in This Session:   Increase interest, engagement, and pleasure in doing things  Feel less tired and more energy during the day        Intervention:   Processed her recent medical visits. Processed some recent interactions with her son and which communication skills she can work on. Talked through her relationship concerns with her boyfriend right now. Talked about self care skills/stress reduction skills.     Assessments completed prior to visit:  The following assessments were completed by patient for this visit:  PHQ9:       8/13/2024     5:34 PM 8/18/2024    11:34 AM 8/28/2024     2:05 PM 9/16/2024     3:26 PM 9/23/2024    11:46 AM 10/3/2024     2:00 PM 10/7/2024    10:50 AM   PHQ-9 SCORE   PHQ-9 Total Score MyChart 23 (Severe depression) 22 (Severe depression) 22 (Severe depression) 22 (Severe depression) 25 (Severe depression) 22 (Severe depression) 24 (Severe depression)   PHQ-9 Total Score 23 22 22 22 25 22 24     GAD7:       6/12/2024     4:08 PM 7/1/2024     2:51 PM 7/25/2024    11:52 AM 8/13/2024     5:35 PM 8/28/2024     2:06 PM 9/16/2024     3:27 PM 10/3/2024     1:58 PM   BO-7 SCORE   Total Score 21 (severe anxiety) 19 (severe anxiety) 21 (severe anxiety) 19 (severe anxiety) 21 (severe anxiety) 20 (severe anxiety) 20 (severe anxiety)   Total Score 21 19 21 19    19 21 20    20 20    20     PROMIS 10-Global Health (all questions and answers displayed):       4/10/2024     1:58 PM 4/17/2024     1:28 PM 7/25/2024    11:56 AM 8/4/2024      7:20 PM 8/10/2024     2:51 PM 8/18/2024    11:36 AM 8/28/2024     2:07 PM   PROMIS 10   In general, would you say your health is: Poor Poor Poor Poor Fair Poor Poor   In general, would you say your quality of life is: Poor Poor Fair Poor Poor Fair Fair   In general, how would you rate your physical health? Poor Poor Poor Poor Poor Poor Fair   In general, how would you rate your mental health, including your mood and your ability to think? Poor Poor Poor Poor Poor Poor Poor   In general, how would you rate your satisfaction with your social activities and relationships? Poor Poor Poor Fair Poor Fair Fair   In general, please rate how well you carry out your usual social activities and roles Fair Poor Poor Poor Poor Fair Fair   To what extent are you able to carry out your everyday physical activities such as walking, climbing stairs, carrying groceries, or moving a chair? A little A little A little Moderately A little A little A little   In the past 7 days, how often have you been bothered by emotional problems such as feeling anxious, depressed, or irritable? Always Always Always Always Always Always Always   In the past 7 days, how would you rate your fatigue on average? Severe Very severe Severe Severe Very severe Very severe Severe   In the past 7 days, how would you rate your pain on average, where 0 means no pain, and 10 means worst imaginable pain? 7 7 7 7 7 7 7   In general, would you say your health is: 1 1 1 1 2 1 1   In general, would you say your quality of life is: 1 1 2 1 1 2 2   In general, how would you rate your physical health? 1 1 1 1 1 1 2   In general, how would you rate your mental health, including your mood and your ability to think? 1 1 1 1 1 1 1   In general, how would you rate your satisfaction with your social activities and relationships? 1 1 1 2 1 2 2   In general, please rate how well you carry out your usual social activities and roles. (This includes activities at home, at work  and in your community, and responsibilities as a parent, child, spouse, employee, friend, etc.) 2 1 1 1 1 2 2   To what extent are you able to carry out your everyday physical activities such as walking, climbing stairs, carrying groceries, or moving a chair? 2 2 2 3 2 2 2   In the past 7 days, how often have you been bothered by emotional problems such as feeling anxious, depressed, or irritable? 5 5 5 5 5 5 5   In the past 7 days, how would you rate your fatigue on average? 4 5 4 4 5 5 4   In the past 7 days, how would you rate your pain on average, where 0 means no pain, and 10 means worst imaginable pain? 7 7 7 7 7 7 7   Global Mental Health Score 4    4 4    4 5    5 5 4    4 6 6   Global Physical Health Score 7    7 6    6 7    7 8 6    6 6 8   PROMIS TOTAL - SUBSCORES 11    11 10    10 12    12 13 10    10 12 14     [place] Suicide Severity Rating Scale (Lifetime/Recent)      5/13/2024     3:13 PM 8/21/2024    10:30 AM 8/30/2024     8:20 AM 8/31/2024    12:07 PM 9/5/2024     5:33 PM 9/13/2024    10:52 AM 10/4/2024     4:54 PM   [place] Suicide Severity Rating (Lifetime/Recent)   Q1 Wished to be Dead (Past Month) 0-->no 0-->no 0-->no 0-->no 0-->no 0-->no 0-->no   Q2 Suicidal Thoughts (Past Month) 0-->no 0-->no 0-->no 0-->no 0-->no 0-->no 0-->no   Q6 Suicide Behavior (Lifetime) 0-->no 0-->no 0-->no 0-->no 1-->yes 0-->no 0-->no   If yes to Q6, within past 3 months?     0-->no     Level of Risk per Screen no risks indicated no risks indicated no risks indicated no risks indicated moderate risk no risks indicated no risks indicated         ASSESSMENT: Current Emotional / Mental Status (status of significant symptoms):   Risk status (Self / Other harm or suicidal ideation)   Patient denies current fears or concerns for personal safety.   Patient denies current or recent suicidal ideation or behaviors.   Patient denies current or recent homicidal ideation or behaviors.   Patient denies current or recent self  injurious behavior or ideation.   Patient denies other safety concerns.   Patient reports there has been no change in risk factors since their last session.     Patient reports there has been no change in protective factors since their last session.     Recommended that patient call 911 or go to the local ED should there be a change in any of these risk factors.     Appearance:   appropriate    Eye Contact:   good    Psychomotor Behavior: Normal    Attitude:   Cooperative    Orientation:   All   Speech    Rate / Production: Normal/ Responsive Normal     Volume:  Normal    Mood:    Normal   Affect:    Appropriate    Thought Content:  Clear    Thought Form:  Coherent    Insight:    Good      Medication Review:   No changes to current psychiatric medication(s)     Medication Compliance:   Yes     Changes in Health Issues:   None reported     Chemical Use Review:   Substance Use: Chemical use reviewed, no active concerns identified      Tobacco Use: No change in amount of tobacco use since last session.  Patient declined discussion at this time    Diagnosis:  1. Moderate episode of recurrent major depressive disorder (H)    2. Generalized anxiety disorder            Collateral Reports Completed:   Not Applicable    PLAN: (Patient Tasks / Therapist Tasks / Other)  Continue to utilize stress reduction skills daily.         Ricarda Bhatia, [facility]                                                         ______________________________________________________________________    Individual Treatment Plan    Patient's Name: Radha Beckwith  YOB: 1975    Date of Creation: 6/28/23  Date Treatment Plan Last Reviewed/Revised: 8/14/24    DSM5 Diagnoses: 296.32 (F33.1) Major Depressive Disorder, Recurrent Episode, Moderate _  Psychosocial / Contextual Factors: living with boyfriend, memory issues  PROMIS (reviewed every 90 days):     Referral / Collaboration:  Referral to another professional/service is not  indicated at this time..    Anticipated number of session for this episode of care: 9-12 sessions  Anticipation frequency of session:  as needed  Anticipated Duration of each session: 38-52 minutes  Treatment plan will be reviewed in 90 days or when goals have been changed.       MeasurableTreatment Goal(s) related to diagnosis / functional impairment(s)  Goal 1: Patient will increase communication skills with family members.    Objective #A (Patient Action)    Patient will learn & utilize at least 2 assertive communication skills weekly.  Status: Continued - Date(s): 8/14/24    Intervention(s)  Therapist will teach emotional regulation skills. distress tolerance skills, interpersonal effectiveness skills, emotion regluation skills, mindfulness skills, radical acceptance. Therapist will teach client how to ID body cues for anxiety, anxiety reduction techniques, how to ID triggers for depression and anxiety- decrease reactivity/eliminate, lifestyle changes to reduce depression and anxiety, communication skills, explore cognitive beliefs and help client to develop healthy cognitive patterns and beliefs.    Objective #B  Patient will use thought-stopping strategy daily to reduce intrusive thoughts.  Status: Continued - Date(s): 8/14/24    Intervention(s)  Therapist will teach emotional regulation skills. distress tolerance skills, interpersonal effectiveness skills, emotion regluation skills, mindfulness skills, radical acceptance. Therapist will teach client how to ID body cues for anxiety, anxiety reduction techniques, how to ID triggers for depression and anxiety- decrease reactivity/eliminate, lifestyle changes to reduce depression and anxiety, communication skills, explore cognitive beliefs and help client to develop healthy cognitive patterns and beliefs.      Goal 2: Patient will decrease depression symptoms.      Objective #A (Patient Action)    Status: Continued - Date(s): 8/14/24    Patient will Increase  interest, engagement, and pleasure in doing things  Decrease frequency and intensity of feeling down, depressed, hopeless  Improve quantity and quality of night time sleep / decrease daytime naps  Feel less tired and more energy during the day   Improve diet, appetite, mindful eating, and / or meal planning  Identify negative self-talk and behaviors: challenge core beliefs, myths, and actions  Improve concentration, focus, and mindfulness in daily activities   Feel less fidgety, restless or slow in daily activities / interpersonal interactions.    Intervention(s)  Therapist will teach emotional regulation skills. distress tolerance skills, interpersonal effectiveness skills, emotion regluation skills, mindfulness skills, radical acceptance. Therapist will teach client how to ID body cues for anxiety, anxiety reduction techniques, how to ID triggers for depression and anxiety- decrease reactivity/eliminate, lifestyle changes to reduce depression and anxiety, communication skills, explore cognitive beliefs and help client to develop healthy cognitive patterns and beliefs.    Objective #B  Patient will identify three distraction and diversion activities and use those activities to decrease level of anxiety  .    Status: Continued - Date(s):  8/14/24    Intervention(s)  Therapist will teach emotional regulation skills. distress tolerance skills, interpersonal effectiveness skills, emotion regluation skills, mindfulness skills, radical acceptance. Therapist will teach client how to ID body cues for anxiety, anxiety reduction techniques, how to ID triggers for depression and anxiety- decrease reactivity/eliminate, lifestyle changes to reduce depression and anxiety, communication skills, explore cognitive beliefs and help client to develop healthy cognitive patterns and beliefs.      Patient has reviewed and agreed to the above plan.      Ricarda Bhatia [facility]

## 2024-10-07 NOTE — PATIENT INSTRUCTIONS
Pain Physical Therapy:  Recent PT for low back/SI and neck pain. Continues HEP.      Pain Psychology: YES - last visit with Karen Kohler PhD on 9/30/24.      Diagnostic Studies:  None      Medication Management:   Duloxetine -  Current dose 60 mg twice daily, increased from 90 mg/day to 120 mg/day at recent visit. Will discuss outcome with dose adjustment at future follow up.   Muscle relaxants:   Continue baclofen 20 mg 3 times daily.  Reports enhanced effect with adding afternoon dose, mild daytime sedation effects are manageable.  Of note, she reports recently starting buspirone 3 times daily, which may also be contributing to CNS depression effects.  Recommended taking baclofen and buspirone at separate times.   Flexeril - 5-10 mg twice daily as needed. PCP managing. Recommend reducing use to once daily as needed if helpful on days with increased pain.   Allow 4-6 hours in between all muscle relaxant doses, do not mix with alcohol.   Medical cannabis - certified for [place] medical cannabis program at last visit on 1/3/24. No clear benefit for pain with daytime products, some benefit at night with products used at bedtime.       Potential procedures:   SI joint injections in the past x 2 - had benefit with 1st injection, mixed/marginal benefit with 2nd. It is likely there are multiple associated factors and will need to repeat physical exam to determine if SI joint versus other injection may be indicated.   Bilateral cervical 3,4,5 medial branch nerve block #1 on 6/27/24. She was scheduled for MBB #2 on 9/10/24 but was cancelled due to s/s of infection. She is not clear on next steps for rescheduling, also received a [employer] message referencing PA and questions if there are insurance issues.   Will message nursing team to further assist.        Other Therapies/Orders/Referrals:   TENS unit - continue use throughout day as needed.   Surgical team (Dr. Dozier) - will connect with them to clarify postop concerns  present, including: persistent elevated pain with marginal improvement since surgery, hiccups and change in stools (new onset since surgery).   Pain Team will update patient as needed, if surgical team does not plan to reach out to her to discuss concerns.      Follow up with JEANNIE Schroeder CNP around 1 week for in person visit. Will have scheduling team contact patient to assist with making appointment.

## 2024-10-08 NOTE — TELEPHONE ENCOUNTER
Please call pt to reschedule REPEAT b/l cervical MBB #1    Damaris RN-BSN  [facility]   [phone]

## 2024-10-10 ENCOUNTER — OFFICE VISIT (OUTPATIENT)
Dept: PALLIATIVE MEDICINE | Facility: CLINIC | Age: 49
End: 2024-10-10
Payer: COMMERCIAL

## 2024-10-10 ENCOUNTER — OFFICE VISIT (OUTPATIENT)
Dept: SURGERY | Facility: CLINIC | Age: 49
End: 2024-10-10
Payer: COMMERCIAL

## 2024-10-10 VITALS — DIASTOLIC BLOOD PRESSURE: 87 MMHG | HEART RATE: 106 BPM | SYSTOLIC BLOOD PRESSURE: 138 MMHG

## 2024-10-10 VITALS
SYSTOLIC BLOOD PRESSURE: 132 MMHG | TEMPERATURE: 96.5 F | BODY MASS INDEX: 27.46 KG/M2 | WEIGHT: 160 LBS | DIASTOLIC BLOOD PRESSURE: 82 MMHG

## 2024-10-10 DIAGNOSIS — G89.18 POST-OP PAIN: Primary | ICD-10-CM

## 2024-10-10 DIAGNOSIS — M54.42 CHRONIC BILATERAL LOW BACK PAIN WITH BILATERAL SCIATICA: ICD-10-CM

## 2024-10-10 DIAGNOSIS — M53.3 PAIN OF BOTH SACROILIAC JOINTS: ICD-10-CM

## 2024-10-10 DIAGNOSIS — M79.2 NEUROPATHIC PAIN: ICD-10-CM

## 2024-10-10 DIAGNOSIS — M47.812 SPONDYLOSIS OF CERVICAL REGION WITHOUT MYELOPATHY OR RADICULOPATHY: ICD-10-CM

## 2024-10-10 DIAGNOSIS — M54.41 CHRONIC BILATERAL LOW BACK PAIN WITH BILATERAL SCIATICA: ICD-10-CM

## 2024-10-10 DIAGNOSIS — G89.4 CHRONIC PAIN SYNDROME: Primary | ICD-10-CM

## 2024-10-10 DIAGNOSIS — M79.18 MYOFASCIAL PAIN: ICD-10-CM

## 2024-10-10 DIAGNOSIS — R10.13 ABDOMINAL PAIN, EPIGASTRIC: ICD-10-CM

## 2024-10-10 DIAGNOSIS — M70.61 TROCHANTERIC BURSITIS OF BOTH HIPS: ICD-10-CM

## 2024-10-10 DIAGNOSIS — M54.2 CHRONIC NECK PAIN: ICD-10-CM

## 2024-10-10 DIAGNOSIS — M47.812 ARTHROPATHY OF CERVICAL FACET JOINT: ICD-10-CM

## 2024-10-10 DIAGNOSIS — G89.18 POSTOPERATIVE PAIN: ICD-10-CM

## 2024-10-10 DIAGNOSIS — M70.62 TROCHANTERIC BURSITIS OF BOTH HIPS: ICD-10-CM

## 2024-10-10 DIAGNOSIS — G89.29 CHRONIC BILATERAL LOW BACK PAIN WITH BILATERAL SCIATICA: ICD-10-CM

## 2024-10-10 DIAGNOSIS — G89.29 CHRONIC NECK PAIN: ICD-10-CM

## 2024-10-10 PROCEDURE — 99215 OFFICE O/P EST HI 40 MIN: CPT

## 2024-10-10 PROCEDURE — 99213 OFFICE O/P EST LOW 20 MIN: CPT | Performed by: SPECIALIST

## 2024-10-10 RX ORDER — NORTRIPTYLINE HYDROCHLORIDE 10 MG/1
CAPSULE ORAL
Qty: 60 CAPSULE | Refills: 1 | Status: SHIPPED | OUTPATIENT
Start: 2024-10-10 | End: 2024-11-04

## 2024-10-10 ASSESSMENT — PAIN SCALES - PAIN ENJOYMENT GENERAL ACTIVITY SCALE (PEG)
INTERFERED_GENERAL_ACTIVITY: 8
PEG_TOTALSCORE: 8
INTERFERED_ENJOYMENT_LIFE: 9
AVG_PAIN_PASTWEEK: 7

## 2024-10-10 ASSESSMENT — PAIN SCALES - GENERAL
PAINLEVEL: SEVERE PAIN (7)
PAINLEVEL: SEVERE PAIN (7)

## 2024-10-10 NOTE — PROGRESS NOTES
[facility] Pain Management     Date of visit: 10/10/2024      Assessment:   Radha Beckwith is a 48 year old female with a past medical history significant for chronic bilateral low back pain, depression, SVT, opioid dependence in remission, anxiety, tobacco use, GERD, restless legs who presents with complaints of neck pain, mid and low back.      Low back pain - Onset of pain within last 2 years. Pain is mostly axial, though she does have intermittent radicular leg symptoms, occasionally extends below the knee. On exam, positive facet loading and positive KATHY, sacral thrust and Yeoman's bilaterally. Positive myofascial TTP, negative SLR and neuro exam WNL. Etiology is likely associated with multiple factors, including underlying degenerative changes of lumbar spine, facet and SIJ mediated components, with prominent overlying myofascial component.   Neck/thoracic back pain - Onset of pain within last 2 years. On exam, positive for myofascial TTP, most notably in lower thoracic paraspinals. Etiology is likely associated with multiple factors, including underlying degenerative changes, with prominent overlying myofascial component.      Assigned to [place] nursing team.     Visit Diagnoses:  1. Chronic pain syndrome    2. Postoperative pain    3. Abdominal pain, epigastric    4. Neuropathic pain    5. Trochanteric bursitis of both hips    6. Chronic bilateral low back pain with bilateral sciatica    7. Pain of both sacroiliac joints    8. Myofascial pain    9. Arthropathy of cervical facet joint    10. Chronic neck pain    11. Spondylosis of cervical region without myelopathy or radiculopathy        Plan:  Diagnosis reviewed, treatment option addressed, and risk/benifits discussed.  Self-care instructions given.  I am recommending a multidisciplinary treatment plan to help this patient better manage their pain.      Pain Physical Therapy:  Prior PT for low back/SI and neck pain. Continues HEP.      Pain  Psychology: Previously worked with Karen Kohler PhD, last visit on 9/30/24.      Diagnostic Studies:  EKG 10/4/24 - QT/QTc WNL     Medication Management:   Nortriptyline - start 10 mg at bedtime and titrate to 20 mg dose as tolerated. Monitor for pain benefit, cautioned about potential sedation effects. Prescriptions sent to pharmacy today for 10 mg capsules.   Diclofenac gel to both hips 3-4 x daily. Advised to use consistently and may take 1-2 weeks to notice benefit. If prescription not covered by insurance, may purchase over the counter.   Duloxetine -  Current dose 60 mg twice daily, increased from 90 mg/day to 120 mg/day at recent visit. Will discuss outcome with dose adjustment at future follow up.   Muscle relaxants:   Continue baclofen 20 mg 3 times daily.  Reports enhanced effect with adding afternoon dose, mild daytime sedation effects are manageable. Recommended taking baclofen and buspirone at separate times.   Flexeril - 5-10 mg twice daily as needed. PCP managing. Recommend reducing use to once daily as needed if helpful on days with increased pain.   Allow 4-6 hours in between all muscle relaxant doses, do not mix with alcohol.   [place] at [place] on 1/3/24. No clear benefit for pain with daytime products, some benefit at night with products used at bedtime.       Potential procedures:   Bilateral SI joint injections recommended today - positive bilateral KATHY, sacral thrust, and Yeoman's on repeat exam. Orders placed to schedule with Dr. Reynaga.   Bilateral cervical 3,4,5 medial branch nerve block #1 on 6/27/24. She os scheduled for MBB #2 on 10/24/24.       Other Therapies/Orders/Referrals:   TENS unit - continue use throughout day as needed.      Follow up with JEANNIE Schroeder CNP around 3-4 weeks after SI joint injections       Review of Electronic Chart: Today I have also reviewed available medical information in the patient's  medical record at [facility] ([facility]) and Care Everywhere (if available), including relevant provider notes, laboratory work, and imaging.     Jenny Landrum, DNP, APRN, AGNP-C  [facility] Pain Management     -------------------------------------------------    Subjective:    Chief complaint:   Chief Complaint   Patient presents with    Pain       Interval history:  Radha Beckwith is a 48 year old female last seen on 10/1/24.      Recommendations/plan at the last visit included:  Pain Physical Therapy:  Recent PT for low back/SI and neck pain. Continues HEP.      Pain Psychology: YES - last visit with Karen Kohler PhD on 9/30/24.      Diagnostic Studies:  None      Medication Management:   Duloxetine -  Current dose 60 mg twice daily, increased from 90 mg/day to 120 mg/day at recent visit. Will discuss outcome with dose adjustment at future follow up.   Muscle relaxants:   Continue baclofen 20 mg 3 times daily.  Reports enhanced effect with adding afternoon dose, mild daytime sedation effects are manageable.  Of note, she reports recently starting buspirone 3 times daily, which may also be contributing to CNS depression effects.  Recommended taking baclofen and buspirone at separate times.   Flexeril - 5-10 mg twice daily as needed. PCP managing. Recommend reducing use to once daily as needed if helpful on days with increased pain.   Allow 4-6 hours in between all muscle relaxant doses, do not mix with alcohol.   [place] on 1/3/24. No clear benefit for pain with daytime products, some benefit at night with products used at bedtime.       Potential procedures:   SI joint injections in the past x 2 - had benefit with 1st injection, mixed/marginal benefit with 2nd. It is likely there are multiple associated factors and will need to repeat physical exam to determine if SI joint versus other injection may be indicated.   Bilateral cervical  "3,4,5 medial branch nerve block #1 on 6/27/24. She was scheduled for MBB #2 on 9/10/24 but was cancelled due to s/s of infection. She is not clear on next steps for rescheduling, also received a MyChart message referencing PA and questions if there are insurance issues.   Will message nursing team to further assist.         Other Therapies/Orders/Referrals:   TENS unit - continue use throughout day as needed.   Surgical team (Dr. Dozier) - will connect with them to clarify postop concerns present, including: persistent elevated pain with marginal improvement since surgery, hiccups and change in stools (new onset since surgery).   Pain Team will update patient as needed, if surgical team does not plan to reach out to her to discuss concerns.      Follow up with JEANNIE Schroeder CNP around 1 week for in person visit. Will have scheduling team contact patient to assist with making appointment.     Since her last visit, Radha MASSEY Amena reports:  -She had follow up with Dr. Dozier today, states this was very short and did not feel like she truly got what she was hoping/looking for.   -However, she did say that he did give her additional information.   -She was told this is a \"chronic pain\" now and was advised she can follow up with me for this.   -She reports epigastric pain around incisional site, pain worsens with bending forward. Also notes that she has tingling sensation and sensitivity to light tough (even from shirt).   -She notes that activity in general contributes to epigastric pain.   -She continues to have low back pain. She had mixed results with SI joint injections in the past.   -She is open to trial nortriptyline for abdominal pain.       PEG: A Three-Item Scale Assessing Pain Intensity and Interference    What number best describes your PAIN ON AVERAGE in the past week? 7    What number best describes how, during the past week, pain has interfered with your ENJOYMENT OF LIFE? 9    What number best " "describes how, during the past week, pain has interfered with your GENERAL ACTIVITY? 8    PEG Total Score: 8    Fabian EE, Hayden KA, Kaire MJ, Anjana TA, Álvarez J, Joyce JM, Ban SM, Brandie K. Development and initial validation of the PEG, a 3-item scale assessing pain intensity and interference. Journal of General Internal Medicine. 2009 [place];24:733-738.        HPI/Interval Hx from last visit on 10/1/24:  -This visit today is a continuation from yesterday's visit.   -She called surgeon's office yesterday, was told she had to go to ED, which she notes   -She describes \"bulge\" in surgical area, pain has pattern of worsening throughout day.   -She denies concerns with incision site at this point.   -She reports changes in bowel movements, long and skinny stools. This is different than before surgery.   -She is also getting hiccups more. Never had hiccups before.      -Neck pain is being addressed by MBB/RFA. She was scheduled for block #2 on 9/10, but she had s/s of infection and was not feeling well after surgery.   -She also questions if there were insurance issues with second block, not sure if she was supposed to call back.   -She continues to have persistent low back pain, describes painful area above buttocks.   -She had SI joint injections x 2 in the past, benefit with first one and mixed/marginal benefit with second.   -She had consult with Dr. Arteaga and was referred to Dr. Rosario for SI focused therapy.   -She is not following with Spine team and needs to consolidate care for LBP with clinic again.   -She is understanding that it has been a while since we focused on low back and will need to dedicate more attention at next visit. Recommend in person follow up for repeat physical exam.   -She is agreeable to follow up again at my next available in [place].       Current Pain Treatments:                Suboxone 2-0.5 mg daily (weaning off)              Ropinirole 1 mg at bedtime               Cyclobenzaprine 5-10 " mg QD PRN    Nortriptyline 10-20 mg at bedtime                  Baclofen 20 mg TID              Duloxetine 60 mg BID                 TENS              PT - HEP              CMBB/RFA - MBB #2 needs to be rescheduled   Bilateral SI joint injections ordered today         Current MME: N/A     Review of [place] Prescription Monitoring Program (): No concern for abuse or misuse of controlled medications based on this report. Reviewed - appears appropriate.      Past pain treatments:  SI joint injection 5/16/23 -significant benefit x1 month, then diminishing efficacy  KINDRA on 4/19/24 with Dr. Ivory - marginal benefit   Gabapentin       Medications:  Current Outpatient Medications   Medication Sig Dispense Refill    acetaminophen (TYLENOL) 500 MG tablet Take 1,000 mg by mouth every 6 hours as needed for mild pain      ALPRAZolam (XANAX) 0.5 MG tablet       baclofen (LIORESAL) 20 MG tablet Take 1 tablet (20 mg) by mouth 3 times daily 90 tablet 1    bisacodyl (DULCOLAX) 5 MG EC tablet Take 1 tablet (5 mg) by mouth every other day Take every other day. If no bowel movement for 2 or more days, take 2 tablets of bisacodyl (10 mg) 30 tablet 2    buprenorphine HCl-naloxone HCl (SUBOXONE) 2-0.5 MG per film Place 0.5 Film under the tongue daily       busPIRone HCl (BUSPAR) 30 MG tablet 30 mg 3 times daily      CONSTULOSE 10 GM/15ML solution TAKE 15 MLS (10 G) BY MOUTH 3 TIMES DAILY AS NEEDED FOR CONSTIPATION (AS NEEDED FOR SEVERE CONSTIPAITON, NO BM FOR MORE THAN 3 DAYS AND FEELING CONSTIPATED) 300 mL 3    cyclobenzaprine (FLEXERIL) 5 MG tablet TAKE ONE TO TWO TABLETS BY MOUTH THREE TIMES A DAY AS NEEDED FOR MUSCLE SPASMS 90 tablet 5    diclofenac (VOLTAREN) 1 % topical gel Apply 4 g topically 4 times daily. 350 g 2    DULoxetine (CYMBALTA) 60 MG capsule Take 1 capsule (60 mg) by mouth 2 times daily. 180 capsule 0    famotidine (PEPCID) 40 MG tablet Take 1 tablet (40 mg) by mouth 2 times daily 60 tablet 1    linaclotide  (LINZESS) 72 MCG capsule Take 1 capsule (72 mcg) by mouth every morning (before breakfast). 30 capsule 11    medical cannabis (Patient's own supply) See Admin Instructions. [place] Medical [place]- Purchases through [facility].   (The purpose of this order is to document that the patient reports taking medical cannabis.  This is not a prescription, and is not used to certify that the patient has a qualifying medical condition.)      nortriptyline (PAMELOR) 10 MG capsule Start 10 mg at bedtime x 1 week, then increase to 20 mg at bedtime. 60 capsule 1    ondansetron (ZOFRAN ODT) 4 MG ODT tab DISSOLVE ONE TABLET ON TONGUE EVERY 6 HOURS AS NEEDED FOR NAUSEA 20 tablet 1    pantoprazole (PROTONIX) 40 MG EC tablet TAKE ONE TABLET BY MOUTH TWICE A DAY WITH MEALS 60 tablet 5    rOPINIRole (REQUIP) 1 MG tablet TAKE ONE TABLET BY MOUTH EVERY NIGHT AT BEDTIME 90 tablet 0    simvastatin (ZOCOR) 10 MG tablet TAKE ONE TABLET BY MOUTH EVERY NIGHT AT BEDTIME 90 tablet 1    temazepam (RESTORIL) 15 MG capsule          Medical History: any changes in medical history since they were last seen? No      Objective:    Physical Exam:  Blood pressure 138/87, pulse 106, not currently breastfeeding.  Constitutional: Well developed, well nourished, appears stated age.  Gait: Intact   HEENT: Head atraumatic, normocephalic. Eyes without conjunctival injection or jaundice. Oropharynx clear. Neck supple. No obvious neck masses.  Skin: No rash, lesions, or petechiae of exposed skin.   Psychiatric/mental status: Alert, without lethargy or stupor. Speech fluent. Appropriate affect. Mood normal. Able to follow commands without difficulty.   MSK/Neuro: BLE strength and sensory intact. Negative SLR. Positive bilateral KATHY, Sacral thrust, Yeoman's.     Diagnostic Tests/Imaging/Labs:  EKG 10/4/24 - QT/QTc WNL       BILLING TIME DOCUMENTATION:   The total TIME spent on this patient on the date of the encounter/appointment was 44  minutes.      TOTAL TIME includes:   Time spent preparing to see the patient (reviewing records and tests)   Time spent face to face (or over the phone) with the patient   Time spent ordering tests, medications, procedures and referrals   Time spent Referring and communicating with other healthcare professionals   Time spent documenting clinical information in Epic

## 2024-10-10 NOTE — PROGRESS NOTES
Follow-up for robotically assisted ventral hernia repair.    Subjective:  Patient feels okay.  She continues to have chronic pain at the site and with bending.  Came in with a list of questions.  All questions were answered.  She has been here multiple times with all workups being negative.  She is actually seeing pain management at this time.  I did speak to the pain management provider earlier this week.      Objective:  B/P: 132/82, T: 96.5, P: Data Unavailable, R: Data Unavailable  Abdomen: Incisions healing well.  No signs of infection.  Mildly tender mass in hernia site consistent with residual adipose tissue.  This was seen on CT    EXAM: CT ABDOMEN PELVIS W CONTRAST  LOCATION: [facility]  DATE: 10/4/2024     INDICATION: Incisional pain epigastric.  COMPARISON: 9/13/2024.  TECHNIQUE: CT scan of the abdomen and pelvis was performed following injection of IV contrast. Multiplanar reformats were obtained. Dose reduction techniques were used.  CONTRAST: 78 mL Isovue-370.     FINDINGS:   LOWER CHEST: Normal.     HEPATOBILIARY: Cholecystectomy.     PANCREAS: Normal.     SPLEEN: Calcified granuloma.     ADRENAL GLANDS: Normal.     KIDNEYS/BLADDER: Normal.     BOWEL: Previous upper midline ventral abdominal hernia repair with continued decrease in size of well-defined fatty nodule at the repair site probably fatty necrosis. Minimal amount of soft tissue surrounding infiltration.     LYMPH NODES: Normal.     VASCULATURE: Normal.     PELVIC ORGANS: Hysterectomy.     MUSCULOSKELETAL: Normal.                                                                      IMPRESSION:   1.  Previous upper midline ventral abdominal hernia repair. Decreased size of the well-defined fatty density nodule at the hernia repair site now measuring 1.6 cm. This is probably evolving fat necrosis. There is a small amount of soft tissue   infiltration. No residual hernia.     2.  Cholecystectomy and  hysterectomy.      Assessment/plan:  This is a 48-year-old lady status post a robotic ventral hernia repair.  Doing well other than her known chronic pain issues.  Does have some fat necrosis in the hernia sac.  No signs of infection nor bowel obstruction.  I did answer all her questions.  She is currently seeing a pain management provider.  Discussed the options of excision of the possible necrotic fat versus observation.  I did recommend observation at this time and she is in agreement with that.  After discussion the patient the plan is to have her follow-up with pain management as scheduled and myself in 4 to 6 weeks to see how she is doing.    Michael Dozier MD, FACS

## 2024-10-10 NOTE — LETTER
10/10/2024      Radha Beckwith  [id number] [facility] [address]      Dear Colleague,    Thank you for referring your patient, Radha Beckwith, to the [facility]. Please see a copy of my visit note below.    Follow-up for robotically assisted ventral hernia repair.    Subjective:  Patient feels okay.  She continues to have chronic pain at the site and with bending.  Came in with a list of questions.  All questions were answered.  She has been here multiple times with all workups being negative.  She is actually seeing pain management at this time.  I did speak to the pain management provider earlier this week.      Objective:  B/P: 132/82, T: 96.5, P: Data Unavailable, R: Data Unavailable  Abdomen: Incisions healing well.  No signs of infection.  Mildly tender mass in hernia site consistent with residual adipose tissue.  This was seen on CT    EXAM: CT ABDOMEN PELVIS W CONTRAST  LOCATION: [facility]  DATE: 10/4/2024     INDICATION: Incisional pain epigastric.  COMPARISON: 9/13/2024.  TECHNIQUE: CT scan of the abdomen and pelvis was performed following injection of IV contrast. Multiplanar reformats were obtained. Dose reduction techniques were used.  CONTRAST: 78 mL Isovue-370.     FINDINGS:   LOWER CHEST: Normal.     HEPATOBILIARY: Cholecystectomy.     PANCREAS: Normal.     SPLEEN: Calcified granuloma.     ADRENAL GLANDS: Normal.     KIDNEYS/BLADDER: Normal.     BOWEL: Previous upper midline ventral abdominal hernia repair with continued decrease in size of well-defined fatty nodule at the repair site probably fatty necrosis. Minimal amount of soft tissue surrounding infiltration.     LYMPH NODES: Normal.     VASCULATURE: Normal.     PELVIC ORGANS: Hysterectomy.     MUSCULOSKELETAL: Normal.                                                                      IMPRESSION:   1.  Previous upper midline ventral abdominal hernia repair. Decreased size of  the well-defined fatty density nodule at the hernia repair site now measuring 1.6 cm. This is probably evolving fat necrosis. There is a small amount of soft tissue   infiltration. No residual hernia.     2.  Cholecystectomy and hysterectomy.      Assessment/plan:  This is a 48-year-old lady status post a robotic ventral hernia repair.  Doing well other than her known chronic pain issues.  Does have some fat necrosis in the hernia sac.  No signs of infection nor bowel obstruction.  I did answer all her questions.  She is currently seeing a pain management provider.  Discussed the options of excision of the possible necrotic fat versus observation.  I did recommend observation at this time and she is in agreement with that.  After discussion the patient the plan is to have her follow-up with pain management as scheduled and myself in 4 to 6 weeks to see how she is doing.    Michael Dozier MD, FACS      Again, thank you for allowing me to participate in the care of your patient.        Sincerely,        Michael Dozier MD

## 2024-10-10 NOTE — PATIENT INSTRUCTIONS
Medication Management:   Nortriptyline - start 10 mg at bedtime and titrate to 20 mg dose as tolerated. Monitor for pain benefit, cautioned about potential sedation effects. Prescriptions sent to pharmacy today for 10 mg capsules.   Diclofenac gel to both hips 3-4 x daily. Advised to use consistently and may take 1-2 weeks to notice benefit. If prescription not covered by insurance, may purchase over the counter.   Duloxetine -  Current dose 60 mg twice daily, increased from 90 mg/day to 120 mg/day at recent visit. Will discuss outcome with dose adjustment at future follow up.   Muscle relaxants:   Continue baclofen 20 mg 3 times daily.  Reports enhanced effect with adding afternoon dose, mild daytime sedation effects are manageable. Recommended taking baclofen and buspirone at separate times.   Flexeril - 5-10 mg twice daily as needed. PCP managing. Recommend reducing use to once daily as needed if helpful on days with increased pain.   Allow 4-6 hours in between all muscle relaxant doses, do not mix with alcohol.   [place] - [place] at last visit on 1/3/24. No clear benefit for pain with daytime products, some benefit at night with products used at bedtime.       Potential procedures:   Bilateral SI joint injections recommended today - positive bilateral KATHY, sacral thrust, and Yeoman's on repeat exam. Orders placed to schedule with Dr. Reynaga.   Bilateral cervical 3,4,5 medial branch nerve block #1 on 6/27/24. She os scheduled for MBB #2 on 10/24/24.    Other Therapies/Orders/Referrals:   TENS unit - continue use throughout day as needed.   Follow up with JEANNIE Schroeder CNP around 3-4 weeks after SI joint injections     ----------------------------------------------------------------  Clinic Number:  [phone]   Call with any questions about your care and for scheduling assistance.   Calls are returned Monday through Friday between 8 AM and 4:30 PM. We usually  get back to you within 2 business days depending on the issue/request.    If we are prescribing your medications:  For opioid medication refills, call the clinic or send a [place] message 7 days in advance.  Please include:  Name of requested medication  Name of the pharmacy.  For non-opioid medications, call your pharmacy directly to request a refill. Please allow 3-4 days to be processed.   Per [place] State Law:  All controlled substance prescriptions must be filled within 30 days of being written.    For those controlled substances allowing refills, pickup must occur within 30 days of last fill.      We believe regular attendance is key to your success in our program!    Any time you are unable to keep your appointment we ask that you call us at least 24 hours in advance to cancel.This will allow us to offer the appointment time to another patient.   Multiple missed appointments may lead to dismissal from the clinic.

## 2024-10-11 ENCOUNTER — TELEPHONE (OUTPATIENT)
Dept: FAMILY MEDICINE | Facility: CLINIC | Age: 49
End: 2024-10-11
Payer: COMMERCIAL

## 2024-10-11 ENCOUNTER — E-VISIT (OUTPATIENT)
Dept: URGENT CARE | Facility: CLINIC | Age: 49
End: 2024-10-11
Payer: COMMERCIAL

## 2024-10-11 DIAGNOSIS — R21 RASH: Primary | ICD-10-CM

## 2024-10-11 PROCEDURE — 99207 PR NON-BILLABLE SERV PER CHARTING: CPT | Performed by: FAMILY MEDICINE

## 2024-10-11 NOTE — PATIENT INSTRUCTIONS
Dear Radha Beckwith,    We are sorry you are not feeling well. Based on the responses you provided, it is recommended that you be seen in-person in urgent care so we can better evaluate your symptoms. Please click here to find the nearest urgent care location to you.   You will not be charged for this Visit. Thank you for trusting us with your care.    Min Ventura MD

## 2024-10-11 NOTE — PROGRESS NOTES
"    DISCHARGE  Reason for Discharge: Patient has failed to schedule further appointments. No visits made since August, and was struggling then. Discharge at this time.     Equipment Issued: HEP, instruction on home traction    Discharge Plan: Patient to continue home program.    Referring Provider:  Guille Rosario     08/05/24 0500   Appointment Info   Signing clinician's name / credentials Edith Mills, PT, DPT   Total/Authorized Visits 13   Visits Used 5/10   Medical Diagnosis Cervicalgia (M54.2)  - Primary   PT Tx Diagnosis Cervicalgia (M54.2)  - Primary   Precautions/Limitations Hypersensitive system   Progress Note/Certification   Start of Care Date 04/02/24   Onset of illness/injury or Date of Surgery 02/12/24  (Date of referral)   Therapy Frequency 1-2x/week   Predicted Duration 6-8 weeks   Certification date from 06/10/24   Certification date to 08/05/24   Progress Note Due Date 06/10/24   PT Goal 4   Goal Identifier NDI   Goal Description Patient will have NDI reduced from 74% down to <45% to better improve tolerance to ADLs with reduced pain   Goal Progress 53.3 down from 74% at evaluation   Target Date 08/05/24   PT Goal 5   Goal Identifier Headaches/neck pain   Goal Description Patient notes 50% reduced headache and neck pain on computer for 30 minutes   Goal Progress 30%   Target Date 08/05/24   Subjective Report   Subjective Report Patient states \"getting worse and worse\". States 11am on computer, by 1 pm her neck was so sore and if move a certain way, get a catch. B lateral neck tension and back of neck. States chin tucks are more tender and tight than they used to be, She states has been trying chin tucks at reduced intensity, does get easier after she does the first few. She still endorses needing to lay down d/t heaviness of the head, using mm and lidocaine patches, Traction is still not used at this point, Still using TENS on the neck and the back. Got appt for pain clinic end of the month. She will " "have umbilical hernia repair with Dr Dozier. She has been trying to continue her PT stretches and states doesnt think it makes her worse, she feels better if she does her exercises in the AM prior to the day and gettng tighter, feels more irrtation if stretching later in the day. SHe continues to work with pain psychology as well her other psychologist for strategies on emotional coping and healing. She does continue to be aware how emotional pain adds to stress/tension. She has chiropractor appt, Dr Ibrahima Montoya, first time  in about a month. .   Therapeutic Procedure/Exercise   Therapeutic Procedures: strength, endurance, ROM, flexibility minutes (47717) 10   PTRx Ther Proc 1 Agreeable   PTRx Ther Proc 1 - Details Discussed theracane and where to buy as option for self MFR, Feels might be helpful at home and plans to purchase when finances allow. Reviewed considerations to begin back in the pool , never transitioned from pool PT to community setting, encouraged this, as time and finances allow for her to remain active in warm water environment and pain mgmt , stress/activity management   Manual Therapy   Manual Therapy: Mobilization, MFR, MLD, friction massage minutes (89229) 30   Manual Therapy 1 - Details Feels more relaxed UT region and still feels \"snapping \" with neck turning as reported   Skilled Intervention Prone TPR UT, Levator, STM scalenes, SCM,Rhomboids/MT,   Patient Response/Progress Patient gains temporary relaxation of neck tension with PT STM/MT, but remains with elevated stress levels, and also flared up since MBB.   Plan   Home program :Levator scap, Scalene, UT and pec stretches. retraction , 50%, towel roll thoracic,, scap rows x 2 with GTB   Updates to plan of care Theracane for home, back to pool for ex   Plan for next session PN/Recert, consider hold if not improving, returning to pain clinic end of month   Total Session Time   Timed Code Treatment Minutes 40   Total Treatment Time " (sum of timed and untimed services) 40

## 2024-10-11 NOTE — TELEPHONE ENCOUNTER
Patient called to report she was in contact with poison ivy and needs a steroid cream for this. Advised patient to use Evisit option in Your [employer].     Erin Cueto RN on 10/11/2024 at 4:38 PM              Radha Brothers    Thank you for your message. Based on your concerns, more evaluation is needed. The good news is we have ways for you to virtually meet with your provider without traveling to the clinic.    E-Visit:  You can visit [email or web address] to complete an e-visit. Click here to complete an eVisit    An e-visit is a visit that happens unscheduled, using messages through your [employer] account and can replace certain clinic appointments or Urgent Care visits depending on your symptoms. E-visits should only be used for non-urgent medical conditions, as it may take up to 1 business day to receive a response. Please visit [email or web address] for e-visit appropriate conditions and instructions on how to complete an e-visit.     Thank you for choosing us your partner in health care.     Your [facility] Care Team

## 2024-10-14 ENCOUNTER — VIRTUAL VISIT (OUTPATIENT)
Dept: PSYCHOLOGY | Facility: CLINIC | Age: 49
End: 2024-10-14
Payer: COMMERCIAL

## 2024-10-14 DIAGNOSIS — F41.1 GENERALIZED ANXIETY DISORDER: ICD-10-CM

## 2024-10-14 DIAGNOSIS — F33.1 MODERATE EPISODE OF RECURRENT MAJOR DEPRESSIVE DISORDER (H): Primary | ICD-10-CM

## 2024-10-14 PROCEDURE — 90834 PSYTX W PT 45 MINUTES: CPT | Mod: 95 | Performed by: COUNSELOR

## 2024-10-14 NOTE — PROGRESS NOTES
Answers submitted by the patient for this visit:  Patient Health Questionnaire (Submitted on 10/14/2024)  If you checked off any problems, how difficult have these problems made it for you to do your work, take care of things at home, or get along with other people?: Extremely difficult  PHQ9 TOTAL SCORE: 23        [facility]                                     Progress Note    Patient Name: Radha Beckwith  Date: 10/14/24         Service Type: Individual      Session Start Time: 3:30pm Session End Time:  4:20pm     Session Length:  50    Session #: 55    Attendees: Client    Service Modality:  Video      Provider verified identity through the following two step process.  Patient provided:  Patient is known previously to provider    Telemedicine Visit: The patient's condition can be safely assessed and treated via synchronous audio and visual telemedicine encounter.      Reason for Telemedicine Visit: Patient convenience (e.g. access to timely appointments / distance to available provider)    Originating Site (Patient Location): Patient's home    Distant Site (Provider Location): Provider Remote Setting- Home Office    Consent:  The patient/guardian has verbally consented to: the potential risks and benefits of telemedicine (video visit) versus in person care; bill my insurance or make self-payment for services provided; and responsibility for payment of non-covered services.     Patient would like the video invitation sent by:  My Chart    Mode of Communication:  video    Distant Location (Provider):  Off-site home office    As the provider I attest to compliance with applicable laws and regulations related to telemedicine.    DATA  Interactive Complexity: No  Crisis: No        Progress Since Last Session (Related to Symptoms / Goals / Homework):   Symptoms: No change .    Homework: Completed in session      Episode of Care Goals: Satisfactory progress - MAINTENANCE (Working to maintain  change, with risk of relapse); Intervened by continuing to positively reinforce healthy behavior choice      Current / Ongoing Stressors and Concerns:   The client stated today has been difficult regarding her hernia pain. Saw her surgeon and her pain doctor. Was prescribed Nortriptyline for nerve pain.      Treatment Objective(s) Addressed in This Session:   Increase interest, engagement, and pleasure in doing things  Feel less tired and more energy during the day        Intervention:   Processed her frustrations around the pain and the fear of taking the medication. Encouraged the client's idea of talking to the pharmacist about the new medication and any interactions it might have. Continued to process through frustrations about her partner.       Assessments completed prior to visit:  The following assessments were completed by patient for this visit:  PHQ9:       8/18/2024    11:34 AM 8/28/2024     2:05 PM 9/16/2024     3:26 PM 9/23/2024    11:46 AM 10/3/2024     2:00 PM 10/7/2024    10:50 AM 10/14/2024     3:26 PM   PHQ-9 SCORE   PHQ-9 Total Score MyChart 22 (Severe depression) 22 (Severe depression) 22 (Severe depression) 25 (Severe depression) 22 (Severe depression) 24 (Severe depression) 23 (Severe depression)   PHQ-9 Total Score 22 22 22 25 22 24 23     GAD7:       6/12/2024     4:08 PM 7/1/2024     2:51 PM 7/25/2024    11:52 AM 8/13/2024     5:35 PM 8/28/2024     2:06 PM 9/16/2024     3:27 PM 10/3/2024     1:58 PM   BO-7 SCORE   Total Score 21 (severe anxiety) 19 (severe anxiety) 21 (severe anxiety) 19 (severe anxiety) 21 (severe anxiety) 20 (severe anxiety) 20 (severe anxiety)   Total Score 21 19 21 19    19 21 20    20 20    20     PROMIS 10-Global Health (all questions and answers displayed):       4/10/2024     1:58 PM 4/17/2024     1:28 PM 7/25/2024    11:56 AM 8/4/2024     7:20 PM 8/10/2024     2:51 PM 8/18/2024    11:36 AM 8/28/2024     2:07 PM   PROMIS 10   In general, would you say your health  is: Poor Poor Poor Poor Fair Poor Poor   In general, would you say your quality of life is: Poor Poor Fair Poor Poor Fair Fair   In general, how would you rate your physical health? Poor Poor Poor Poor Poor Poor Fair   In general, how would you rate your mental health, including your mood and your ability to think? Poor Poor Poor Poor Poor Poor Poor   In general, how would you rate your satisfaction with your social activities and relationships? Poor Poor Poor Fair Poor Fair Fair   In general, please rate how well you carry out your usual social activities and roles Fair Poor Poor Poor Poor Fair Fair   To what extent are you able to carry out your everyday physical activities such as walking, climbing stairs, carrying groceries, or moving a chair? A little A little A little Moderately A little A little A little   In the past 7 days, how often have you been bothered by emotional problems such as feeling anxious, depressed, or irritable? Always Always Always Always Always Always Always   In the past 7 days, how would you rate your fatigue on average? Severe Very severe Severe Severe Very severe Very severe Severe   In the past 7 days, how would you rate your pain on average, where 0 means no pain, and 10 means worst imaginable pain? 7 7 7 7 7 7 7   In general, would you say your health is: 1 1 1 1 2 1 1   In general, would you say your quality of life is: 1 1 2 1 1 2 2   In general, how would you rate your physical health? 1 1 1 1 1 1 2   In general, how would you rate your mental health, including your mood and your ability to think? 1 1 1 1 1 1 1   In general, how would you rate your satisfaction with your social activities and relationships? 1 1 1 2 1 2 2   In general, please rate how well you carry out your usual social activities and roles. (This includes activities at home, at work and in your community, and responsibilities as a parent, child, spouse, employee, friend, etc.) 2 1 1 1 1 2 2   To what extent are  you able to carry out your everyday physical activities such as walking, climbing stairs, carrying groceries, or moving a chair? 2 2 2 3 2 2 2   In the past 7 days, how often have you been bothered by emotional problems such as feeling anxious, depressed, or irritable? 5 5 5 5 5 5 5   In the past 7 days, how would you rate your fatigue on average? 4 5 4 4 5 5 4   In the past 7 days, how would you rate your pain on average, where 0 means no pain, and 10 means worst imaginable pain? 7 7 7 7 7 7 7   Global Mental Health Score 4    4 4    4 5    5 5 4    4 6 6   Global Physical Health Score 7    7 6    6 7    7 8 6    6 6 8   PROMIS TOTAL - SUBSCORES 11    11 10    10 12    12 13 10    10 12 14     [place] Suicide Severity Rating Scale (Lifetime/Recent)      5/13/2024     3:13 PM 8/21/2024    10:30 AM 8/30/2024     8:20 AM 8/31/2024    12:07 PM 9/5/2024     5:33 PM 9/13/2024    10:52 AM 10/4/2024     4:54 PM   [place] Suicide Severity Rating (Lifetime/Recent)   Q1 Wished to be Dead (Past Month) 0-->no 0-->no 0-->no 0-->no 0-->no 0-->no 0-->no   Q2 Suicidal Thoughts (Past Month) 0-->no 0-->no 0-->no 0-->no 0-->no 0-->no 0-->no   Q6 Suicide Behavior (Lifetime) 0-->no 0-->no 0-->no 0-->no 1-->yes 0-->no 0-->no   If yes to Q6, within past 3 months?     0-->no     Level of Risk per Screen no risks indicated no risks indicated no risks indicated no risks indicated moderate risk no risks indicated no risks indicated         ASSESSMENT: Current Emotional / Mental Status (status of significant symptoms):   Risk status (Self / Other harm or suicidal ideation)   Patient denies current fears or concerns for personal safety.   Patient denies current or recent suicidal ideation or behaviors.   Patient denies current or recent homicidal ideation or behaviors.   Patient denies current or recent self injurious behavior or ideation.   Patient denies other safety concerns.   Patient reports there has been no change in risk factors since  their last session.     Patient reports there has been no change in protective factors since their last session.     Recommended that patient call 911 or go to the local ED should there be a change in any of these risk factors.     Appearance:   appropriate    Eye Contact:   good    Psychomotor Behavior: Normal    Attitude:   Cooperative    Orientation:   All   Speech    Rate / Production: Normal/ Responsive Normal     Volume:  Normal    Mood:    Normal   Affect:    Appropriate    Thought Content:  Clear    Thought Form:  Coherent    Insight:    Good      Medication Review:   No changes to current psychiatric medication(s)     Medication Compliance:   Yes     Changes in Health Issues:   None reported     Chemical Use Review:   Substance Use: Chemical use reviewed, no active concerns identified      Tobacco Use: No change in amount of tobacco use since last session.  Patient declined discussion at this time    Diagnosis:  1. Moderate episode of recurrent major depressive disorder (H)    2. Generalized anxiety disorder            Collateral Reports Completed:   Not Applicable    PLAN: (Patient Tasks / Therapist Tasks / Other)  Continue to utilize stress reduction skills daily.         Ricarda Bhatia [facility]                                                         ______________________________________________________________________    Individual Treatment Plan    Patient's Name: Radha Beckwith  YOB: 1975    Date of Creation: 6/28/23  Date Treatment Plan Last Reviewed/Revised: 10/14/24    DSM5 Diagnoses: 296.32 (F33.1) Major Depressive Disorder, Recurrent Episode, Moderate _  Psychosocial / Contextual Factors: living with boyfriend, memory issues  PROMIS (reviewed every 90 days):     Referral / Collaboration:  Referral to another professional/service is not indicated at this time..    Anticipated number of session for this episode of care: 9-12 sessions  Anticipation frequency of session:  as  needed  Anticipated Duration of each session: 38-52 minutes  Treatment plan will be reviewed in 90 days or when goals have been changed.       MeasurableTreatment Goal(s) related to diagnosis / functional impairment(s)  Goal 1: Patient will increase communication skills with family members.    Objective #A (Patient Action)    Patient will learn & utilize at least 2 assertive communication skills weekly.  Status: Continued - Date(s): 10/14/24    Intervention(s)  Therapist will teach emotional regulation skills. distress tolerance skills, interpersonal effectiveness skills, emotion regluation skills, mindfulness skills, radical acceptance. Therapist will teach client how to ID body cues for anxiety, anxiety reduction techniques, how to ID triggers for depression and anxiety- decrease reactivity/eliminate, lifestyle changes to reduce depression and anxiety, communication skills, explore cognitive beliefs and help client to develop healthy cognitive patterns and beliefs.    Objective #B  Patient will use thought-stopping strategy daily to reduce intrusive thoughts.  Status: Continued - Date(s): 10/14/24    Intervention(s)  Therapist will teach emotional regulation skills. distress tolerance skills, interpersonal effectiveness skills, emotion regluation skills, mindfulness skills, radical acceptance. Therapist will teach client how to ID body cues for anxiety, anxiety reduction techniques, how to ID triggers for depression and anxiety- decrease reactivity/eliminate, lifestyle changes to reduce depression and anxiety, communication skills, explore cognitive beliefs and help client to develop healthy cognitive patterns and beliefs.      Goal 2: Patient will decrease depression symptoms.      Objective #A (Patient Action)    Status: Continued - Date(s): 10/14/24    Patient will Increase interest, engagement, and pleasure in doing things  Decrease frequency and intensity of feeling down, depressed, hopeless  Improve quantity  and quality of night time sleep / decrease daytime naps  Feel less tired and more energy during the day   Improve diet, appetite, mindful eating, and / or meal planning  Identify negative self-talk and behaviors: challenge core beliefs, myths, and actions  Improve concentration, focus, and mindfulness in daily activities   Feel less fidgety, restless or slow in daily activities / interpersonal interactions.    Intervention(s)  Therapist will teach emotional regulation skills. distress tolerance skills, interpersonal effectiveness skills, emotion regluation skills, mindfulness skills, radical acceptance. Therapist will teach client how to ID body cues for anxiety, anxiety reduction techniques, how to ID triggers for depression and anxiety- decrease reactivity/eliminate, lifestyle changes to reduce depression and anxiety, communication skills, explore cognitive beliefs and help client to develop healthy cognitive patterns and beliefs.    Objective #B  Patient will identify three distraction and diversion activities and use those activities to decrease level of anxiety  .    Status: Continued - Date(s):  10/14/24    Intervention(s)  Therapist will teach emotional regulation skills. distress tolerance skills, interpersonal effectiveness skills, emotion regluation skills, mindfulness skills, radical acceptance. Therapist will teach client how to ID body cues for anxiety, anxiety reduction techniques, how to ID triggers for depression and anxiety- decrease reactivity/eliminate, lifestyle changes to reduce depression and anxiety, communication skills, explore cognitive beliefs and help client to develop healthy cognitive patterns and beliefs.      Patient has reviewed and agreed to the above plan.      Ricarda Bhatia [facility]

## 2024-10-15 ENCOUNTER — TELEPHONE (OUTPATIENT)
Dept: ANESTHESIOLOGY | Facility: CLINIC | Age: 49
End: 2024-10-15
Payer: COMMERCIAL

## 2024-10-15 NOTE — TELEPHONE ENCOUNTER
Left Voicemail (1st Attempt) for waitlisted patient to offer the following appointment:    Provider: Jenny Landrum  Appointment Type: Return (Video visit OK)  Date: 10/22/24  Time: Multiple times available  [facility]     Please note there is no guarantee this appointment will be available as it is first come first serve.

## 2024-10-21 ENCOUNTER — VIRTUAL VISIT (OUTPATIENT)
Dept: PSYCHOLOGY | Facility: CLINIC | Age: 49
End: 2024-10-21
Payer: COMMERCIAL

## 2024-10-21 DIAGNOSIS — F33.1 MODERATE EPISODE OF RECURRENT MAJOR DEPRESSIVE DISORDER (H): Primary | ICD-10-CM

## 2024-10-21 DIAGNOSIS — F41.1 GENERALIZED ANXIETY DISORDER: ICD-10-CM

## 2024-10-21 PROCEDURE — 90834 PSYTX W PT 45 MINUTES: CPT | Mod: 93 | Performed by: COUNSELOR

## 2024-10-21 NOTE — PROGRESS NOTES
Answers submitted by the patient for this visit:  Patient Health Questionnaire (Submitted on 10/14/2024)  If you checked off any problems, how difficult have these problems made it for you to do your work, take care of things at home, or get along with other people?: Extremely difficult  PHQ9 TOTAL SCORE: 23        [facility]                                     Progress Note    Patient Name: Radha Beckwith  Date: 10/21/24         Service Type: Individual      Session Start Time: 3:30pm Session End Time:  4:15pm     Session Length:  45    Session #: 56    Attendees: Client    Service Modality:  Telephone      Provider verified identity through the following two step process.  Patient provided:  Patient is known previously to provider    Telemedicine Visit: The patient's condition can be safely assessed and treated via synchronous audio and visual telemedicine encounter.      Reason for Telemedicine Visit: Patient convenience (e.g. access to timely appointments / distance to available provider)    Originating Site (Patient Location): Patient's home    Distant Site (Provider Location): Provider Remote Setting- Home Office    Consent:  The patient/guardian has verbally consented to: the potential risks and benefits of telemedicine (video visit) versus in person care; bill my insurance or make self-payment for services provided; and responsibility for payment of non-covered services.     Patient would like the video invitation sent by:  My Chart    Mode of Communication:  video    Distant Location (Provider):  Off-site home office    As the provider I attest to compliance with applicable laws and regulations related to telemedicine.    DATA  Interactive Complexity: No  Crisis: No        Progress Since Last Session (Related to Symptoms / Goals / Homework):   Symptoms: No change .    Homework: Completed in session      Episode of Care Goals: Satisfactory progress - MAINTENANCE (Working to maintain  change, with risk of relapse); Intervened by continuing to positively reinforce healthy behavior choice      Current / Ongoing Stressors and Concerns:   The session today was switched to telephone due to connection issues.      Treatment Objective(s) Addressed in This Session:   Increase interest, engagement, and pleasure in doing things  Feel less tired and more energy during the day        Intervention:   Mostly checked in with the client today as it was a phone session. Client stated she's doing well other than constantly being in pain. She went to her niece's wedding and had a great time. Things with Yan have been okay but he's been ill.     Assessments completed prior to visit:  The following assessments were completed by patient for this visit:  PHQ9:       8/18/2024    11:34 AM 8/28/2024     2:05 PM 9/16/2024     3:26 PM 9/23/2024    11:46 AM 10/3/2024     2:00 PM 10/7/2024    10:50 AM 10/14/2024     3:26 PM   PHQ-9 SCORE   PHQ-9 Total Score MyChart 22 (Severe depression) 22 (Severe depression) 22 (Severe depression) 25 (Severe depression) 22 (Severe depression) 24 (Severe depression) 23 (Severe depression)   PHQ-9 Total Score 22 22 22 25 22 24 23     GAD7:       6/12/2024     4:08 PM 7/1/2024     2:51 PM 7/25/2024    11:52 AM 8/13/2024     5:35 PM 8/28/2024     2:06 PM 9/16/2024     3:27 PM 10/3/2024     1:58 PM   BO-7 SCORE   Total Score 21 (severe anxiety) 19 (severe anxiety) 21 (severe anxiety) 19 (severe anxiety) 21 (severe anxiety) 20 (severe anxiety) 20 (severe anxiety)   Total Score 21 19 21 19    19 21 20    20 20    20     PROMIS 10-Global Health (all questions and answers displayed):       4/10/2024     1:58 PM 4/17/2024     1:28 PM 7/25/2024    11:56 AM 8/4/2024     7:20 PM 8/10/2024     2:51 PM 8/18/2024    11:36 AM 8/28/2024     2:07 PM   PROMIS 10   In general, would you say your health is: Poor Poor Poor Poor Fair Poor Poor   In general, would you say your quality of life is: Poor Poor Fair  Poor Poor Fair Fair   In general, how would you rate your physical health? Poor Poor Poor Poor Poor Poor Fair   In general, how would you rate your mental health, including your mood and your ability to think? Poor Poor Poor Poor Poor Poor Poor   In general, how would you rate your satisfaction with your social activities and relationships? Poor Poor Poor Fair Poor Fair Fair   In general, please rate how well you carry out your usual social activities and roles Fair Poor Poor Poor Poor Fair Fair   To what extent are you able to carry out your everyday physical activities such as walking, climbing stairs, carrying groceries, or moving a chair? A little A little A little Moderately A little A little A little   In the past 7 days, how often have you been bothered by emotional problems such as feeling anxious, depressed, or irritable? Always Always Always Always Always Always Always   In the past 7 days, how would you rate your fatigue on average? Severe Very severe Severe Severe Very severe Very severe Severe   In the past 7 days, how would you rate your pain on average, where 0 means no pain, and 10 means worst imaginable pain? 7 7 7 7 7 7 7   In general, would you say your health is: 1 1 1 1 2 1 1   In general, would you say your quality of life is: 1 1 2 1 1 2 2   In general, how would you rate your physical health? 1 1 1 1 1 1 2   In general, how would you rate your mental health, including your mood and your ability to think? 1 1 1 1 1 1 1   In general, how would you rate your satisfaction with your social activities and relationships? 1 1 1 2 1 2 2   In general, please rate how well you carry out your usual social activities and roles. (This includes activities at home, at work and in your community, and responsibilities as a parent, child, spouse, employee, friend, etc.) 2 1 1 1 1 2 2   To what extent are you able to carry out your everyday physical activities such as walking, climbing stairs, carrying  groceries, or moving a chair? 2 2 2 3 2 2 2   In the past 7 days, how often have you been bothered by emotional problems such as feeling anxious, depressed, or irritable? 5 5 5 5 5 5 5   In the past 7 days, how would you rate your fatigue on average? 4 5 4 4 5 5 4   In the past 7 days, how would you rate your pain on average, where 0 means no pain, and 10 means worst imaginable pain? 7 7 7 7 7 7 7   Global Mental Health Score 4    4 4    4 5    5 5 4    4 6 6   Global Physical Health Score 7    7 6    6 7    7 8 6    6 6 8   PROMIS TOTAL - SUBSCORES 11    11 10    10 12    12 13 10    10 12 14     Routt Suicide Severity Rating Scale (Lifetime/Recent)      5/13/2024     3:13 PM 8/21/2024    10:30 AM 8/30/2024     8:20 AM 8/31/2024    12:07 PM 9/5/2024     5:33 PM 9/13/2024    10:52 AM 10/4/2024     4:54 PM   Routt Suicide Severity Rating (Lifetime/Recent)   Q1 Wished to be Dead (Past Month) 0-->no 0-->no 0-->no 0-->no 0-->no 0-->no 0-->no   Q2 Suicidal Thoughts (Past Month) 0-->no 0-->no 0-->no 0-->no 0-->no 0-->no 0-->no   Q6 Suicide Behavior (Lifetime) 0-->no 0-->no 0-->no 0-->no 1-->yes 0-->no 0-->no   If yes to Q6, within past 3 months?     0-->no     Level of Risk per Screen no risks indicated no risks indicated no risks indicated no risks indicated moderate risk no risks indicated no risks indicated         ASSESSMENT: Current Emotional / Mental Status (status of significant symptoms):   Risk status (Self / Other harm or suicidal ideation)   Patient denies current fears or concerns for personal safety.   Patient denies current or recent suicidal ideation or behaviors.   Patient denies current or recent homicidal ideation or behaviors.   Patient denies current or recent self injurious behavior or ideation.   Patient denies other safety concerns.   Patient reports there has been no change in risk factors since their last session.     Patient reports there has been no change in protective factors since their  last session.     Recommended that patient call 911 or go to the local ED should there be a change in any of these risk factors.     Appearance:   appropriate    Eye Contact:   good    Psychomotor Behavior: Normal    Attitude:   Cooperative    Orientation:   All   Speech    Rate / Production: Normal/ Responsive Normal     Volume:  Normal    Mood:    Normal   Affect:    Appropriate    Thought Content:  Clear    Thought Form:  Coherent    Insight:    Good      Medication Review:   No changes to current psychiatric medication(s)     Medication Compliance:   Yes     Changes in Health Issues:   None reported     Chemical Use Review:   Substance Use: Chemical use reviewed, no active concerns identified      Tobacco Use: No change in amount of tobacco use since last session.  Patient declined discussion at this time    Diagnosis:  1. Moderate episode of recurrent major depressive disorder (H)    2. Generalized anxiety disorder            Collateral Reports Completed:   Not Applicable    PLAN: (Patient Tasks / Therapist Tasks / Other)  Continue to utilize stress reduction skills daily.         Ricarda Bhatia [facility]                                                         ______________________________________________________________________    Individual Treatment Plan    Patient's Name: Radha Beckwith  YOB: 1975    Date of Creation: 6/28/23  Date Treatment Plan Last Reviewed/Revised: 10/14/24    DSM5 Diagnoses: 296.32 (F33.1) Major Depressive Disorder, Recurrent Episode, Moderate _  Psychosocial / Contextual Factors: living with boyfriend, memory issues  PROMIS (reviewed every 90 days):     Referral / Collaboration:  Referral to another professional/service is not indicated at this time..    Anticipated number of session for this episode of care: 9-12 sessions  Anticipation frequency of session:  as needed  Anticipated Duration of each session: 38-52 minutes  Treatment plan will be reviewed in 90 days  or when goals have been changed.       MeasurableTreatment Goal(s) related to diagnosis / functional impairment(s)  Goal 1: Patient will increase communication skills with family members.    Objective #A (Patient Action)    Patient will learn & utilize at least 2 assertive communication skills weekly.  Status: Continued - Date(s): 10/14/24    Intervention(s)  Therapist will teach emotional regulation skills. distress tolerance skills, interpersonal effectiveness skills, emotion regluation skills, mindfulness skills, radical acceptance. Therapist will teach client how to ID body cues for anxiety, anxiety reduction techniques, how to ID triggers for depression and anxiety- decrease reactivity/eliminate, lifestyle changes to reduce depression and anxiety, communication skills, explore cognitive beliefs and help client to develop healthy cognitive patterns and beliefs.    Objective #B  Patient will use thought-stopping strategy daily to reduce intrusive thoughts.  Status: Continued - Date(s): 10/14/24    Intervention(s)  Therapist will teach emotional regulation skills. distress tolerance skills, interpersonal effectiveness skills, emotion regluation skills, mindfulness skills, radical acceptance. Therapist will teach client how to ID body cues for anxiety, anxiety reduction techniques, how to ID triggers for depression and anxiety- decrease reactivity/eliminate, lifestyle changes to reduce depression and anxiety, communication skills, explore cognitive beliefs and help client to develop healthy cognitive patterns and beliefs.      Goal 2: Patient will decrease depression symptoms.      Objective #A (Patient Action)    Status: Continued - Date(s): 10/14/24    Patient will Increase interest, engagement, and pleasure in doing things  Decrease frequency and intensity of feeling down, depressed, hopeless  Improve quantity and quality of night time sleep / decrease daytime naps  Feel less tired and more energy during the day    Improve diet, appetite, mindful eating, and / or meal planning  Identify negative self-talk and behaviors: challenge core beliefs, myths, and actions  Improve concentration, focus, and mindfulness in daily activities   Feel less fidgety, restless or slow in daily activities / interpersonal interactions.    Intervention(s)  Therapist will teach emotional regulation skills. distress tolerance skills, interpersonal effectiveness skills, emotion regluation skills, mindfulness skills, radical acceptance. Therapist will teach client how to ID body cues for anxiety, anxiety reduction techniques, how to ID triggers for depression and anxiety- decrease reactivity/eliminate, lifestyle changes to reduce depression and anxiety, communication skills, explore cognitive beliefs and help client to develop healthy cognitive patterns and beliefs.    Objective #B  Patient will identify three distraction and diversion activities and use those activities to decrease level of anxiety  .    Status: Continued - Date(s):  10/14/24    Intervention(s)  Therapist will teach emotional regulation skills. distress tolerance skills, interpersonal effectiveness skills, emotion regluation skills, mindfulness skills, radical acceptance. Therapist will teach client how to ID body cues for anxiety, anxiety reduction techniques, how to ID triggers for depression and anxiety- decrease reactivity/eliminate, lifestyle changes to reduce depression and anxiety, communication skills, explore cognitive beliefs and help client to develop healthy cognitive patterns and beliefs.      Patient has reviewed and agreed to the above plan.      Ricarda Bhatia [facility]

## 2024-10-24 ENCOUNTER — RADIOLOGY INJECTION OFFICE VISIT (OUTPATIENT)
Dept: PALLIATIVE MEDICINE | Facility: CLINIC | Age: 49
End: 2024-10-24
Payer: COMMERCIAL

## 2024-10-24 VITALS — SYSTOLIC BLOOD PRESSURE: 111 MMHG | HEART RATE: 89 BPM | DIASTOLIC BLOOD PRESSURE: 89 MMHG | OXYGEN SATURATION: 97 %

## 2024-10-24 DIAGNOSIS — M47.812 SPONDYLOSIS OF CERVICAL REGION WITHOUT MYELOPATHY OR RADICULOPATHY: ICD-10-CM

## 2024-10-24 PROCEDURE — 64490 INJ PARAVERT F JNT C/T 1 LEV: CPT | Mod: 50 | Performed by: PAIN MEDICINE

## 2024-10-24 PROCEDURE — 64491 INJ PARAVERT F JNT C/T 2 LEV: CPT | Mod: 50 | Performed by: PAIN MEDICINE

## 2024-10-24 ASSESSMENT — PAIN SCALES - GENERAL: PAINLEVEL_OUTOF10: SEVERE PAIN (6)

## 2024-10-24 NOTE — PATIENT INSTRUCTIONS
[facility]   Medial Branch Block Discharge Instructions      Your procedure was performed by: Dr. King Reynaga       Medications used include:  Lidocaine  Bupivicaine  Omnipaque  Omniscan  Ropivicaine Normal saline     You will need to complete the Pain Scale Log form and return it to us as soon as possible.  Once we have received the form, we will review it and call you to determine the next steps.     The form can be faxed to [phone] or mailed to:   [facility]   [id number]   [place] [address]    You may resume your regular medications  You may resume your regular activities  Be cautious with activities as numbness and/or weakness in the upper extremities may occur for up to 6-8 hours due to the effects of the anesthetic.  Avoid driving for 6 hours. The local anesthetic could slow your reflexes.   You may shower, however no swimming or tub baths or hot tubs for 24 hours following your procedure.  Your pain will return after the numbing medications have worn off.  You may use your current pain medications as needed.  Unless you have been directed to avoid the use of anti-inflammatory medications (NSAIDS), you may use medications such as ibuprofen, Aleve or Tylenol for pain control if needed.  Some people find it helpful to alternate ibuprofen and Tylenol every 3 hours for a couple of days.  You may use ice packs 10-15 minutes three to four times a day at the injection site for comfort.   Do not use heat to painful areas for 6 to 8 hours. This will give the local anesthetic time to wear off and prevent you from accidentally burning your skin.   If you experience any of the following, call the Pain Clinic during work hours (Monday through Friday 8 am-4:30 pm) at [phone] or the Provider Line after hours at [phone]:  -Fever over 100 degree F  -Swelling, bleeding, redness, drainage, warmth at the injection site  -Progressive weakness  or numbness in your arms  -Unusual headache that is not relieved by Tylenol or other pain reliever  -Unusual new onset of pain that is not improving

## 2024-10-24 NOTE — NURSING NOTE
Discharge Information    IV Discontiued Time:  NA    Amount of Fluid Infused:  NA    Discharge Criteria = When patient returns to baseline or as per MD order    Consciousness:  Pt is fully awake    Circulation:  BP +/- 20% of pre-procedure level    Respiration:  Patient is able to breathe deeply    O2 Sat:  Patient is able to maintain O2 Sat >92% on room air    Activity:  Moves 4 extremities on command    Ambulation:  Patient is able to stand and walk or stand and pivot into wheelchair    Dressing:  Clean/dry or No Dressing    Notes:   Discharge instructions and AVS given to patient    Patient meets criteria for discharge?  YES    Admitted to PCU?  No    Responsible adult present to accompany patient home?  Yes    Signature/Title:    Reema Gaffney RN  RN Care Coordinator  [facility]

## 2024-10-25 NOTE — PROGRESS NOTES
"  [address] [address]  Phone: [phone]     Patient:  Radha Beckwith, Date of birth 1975     Referring Provider: Gisselle Linn         Gastroenterology CLINIC VISIT, RETURN PATIENT     REASON FOR CONSULTATION: follow up     HPI: 48 year old female presented to GI clinic for a follow up. The patient was seen for acid reflux and drug induced constipation. She was complaining of poor control of acid reflux despite taking maximal dose of pantoprazole. Was taking famotidine as needed.  A trial of sucralfate was prescribed but the patient stopped the medication as she believes it was making her abdominal pain worse. Patient is on baclofen twice a day (that patient takes for back pain). Last EGD in Jan.2023, was suggestive for diffuse mild erythema of the stomach.  Pathology report confirmed mild inactive chronic gastritis.  H. pylori was negative. Due to persistent symptoms, I ordered EGD with BRAVO. Patient would like to review the study findings. Stated that her symptoms are relatively well managed at this point. Experiences acid reflux occasionally. Said that some of her pills \"don't go down easy\". Denies chocking on pills or foods, but said that swallowing is uncomfortable at times.     Patient's main complaint today is ongoing upper abdominal pain since her surgery. She had a hernia repair with mesh placement on 8/30/2024. Has been palpating painful nodule at one of the incisions . Cannot wear tight close or bra due to pain. Noted that the patient recently had  two CT scans of abdomen. There was moderate amount of stool in the colon, and possible necrosis of subcutaneous fat of the anterior abdomen.  A follow up CT showed decreased size of the well-defined fatty density nodule at the hernia repair site measuring 1.6 cm. MRI of pelvis was ordered by gynecology and came back unremarkable.         PREVIOUS ENDOSCOPY:  8/27/2024 BRAVO  This " was an inconclusive study OFF therapy.  The overall % acid exposure of 3.9% was not elevated. The DeMeester was 13.6 overall which is not elevated.  There were 32 occurrences of heartburn, 13 of cough, 12 of regurgitation symptoms. SI was 21.9 (7/32), 23.1 (3/13), 33.3 (4/12) respectively overall for acid reflux which does not suggest a correlation. The SAP was 99.9, 95.2, 99.9 respectively which suggests a correlation. Interpret within clinical context.     Day 1  0.8% acid exposure, DeMeester Score 2.8   Day 2  4.5% acid exposure, DeMeester Score 17.1   Day 3  4.4% acid exposure, DeMeester Score 14.0   Day 4  6.2% acid exposure, DeMeester Score 17.9       8/21/2024  Findings:       Esophagogastric landmarks were identified: the Z-line was found at 37        cm, the gastroesophageal junction was found at 37 cm and the site of the        diaphragmatic hiatus was found at 40 cm from the incisors.        No endoscopic abnormality was evident in the esophagus to explain the        patient's complaint of dysphagia. It was decided, however, to proceed        with dilation of the entire esophagus. A TTS dilator was passed through        the scope. Dilation with 14-19mm dilator was performed to 19 mm. The        dilation site was examined and showed no change. Estimated blood loss:        none.        The gastroesophageal flap valve was visualized endoscopically and        classified as Hill Grade IV (no fold, wide open lumen, hiatal hernia        present).        A 3 cm hiatal hernia was present.        The BRAVO capsule with delivery system was introduced through the mouth        and advanced into the esophagus, such that the BRAVO pH capsule was        positioned 31 cm from the incisors, which was 6 cm proximal to the GE        junction. The BRAVO pH capsule was then deployed and attached to the        esophageal mucosa. The delivery system was then withdrawn. Endoscopy was        utilized for probe placement and  diagnostic evaluation. The scope was        reinserted to evaluate placement of the BRAVO capsule. Visualization        showed the BRAVO capsule to be in an appropriate position.        The entire examined stomach was normal.        The cardia and gastric fundus were normal on retroflexion.        A few localized erosions without bleeding were found in the duodenal        bulb.        The exam of the duodenum was otherwise normal.         10/3/2023 EGD  Findings:       The Z-line was regular and was found 37 cm from the incisors.        No endoscopic abnormality was evident in the esophagus to explain the        patient's complaint of dysphagia. Biopsies were taken proximal and        distal with a cold forceps for histology.        The stomach was normal.        The examined duodenum was normal.   Final Diagnosis   Esophagus, proximal and distal, biopsies:  --No significant histopathologic change.            PERTINENT IMAGING STUDIES WERE REVIEWED IN EMR    10/4/2024 CT abdomen/ pelvis  FINDINGS:   LOWER CHEST: Normal.   HEPATOBILIARY: Cholecystectomy.   PANCREAS: Normal.   SPLEEN: Calcified granuloma.   ADRENAL GLANDS: Normal.   KIDNEYS/BLADDER: Normal.   BOWEL: Previous upper midline ventral abdominal hernia repair with continued decrease in size of well-defined fatty nodule at the repair site probably fatty necrosis. Minimal amount of soft tissue surrounding infiltration.   LYMPH NODES: Normal.   VASCULATURE: Normal.   PELVIC ORGANS: Hysterectomy.   MUSCULOSKELETAL: Normal.                                                                  IMPRESSION:   1.  Previous upper midline ventral abdominal hernia repair. Decreased size of the well-defined fatty density nodule at the hernia repair site now measuring 1.6 cm. This is probably evolving fat necrosis. There is a small amount of soft tissue   infiltration. No residual hernia.   2.  Cholecystectomy and hysterectomy.      9/13/2024 CT scan of  abdomen/pelvis  FINDINGS:   LOWER CHEST: Subsegmental atelectasis of the lung bases.     HEPATOBILIARY: Cholecystectomy. No biliary ductal dilatation. No focal  liver lesion.     PANCREAS: No significant mass, duct dilatation, or inflammatory  change.     SPLEEN: Normal size. Few calcified splenic granulomas.     ADRENAL GLANDS: No significant nodules.     KIDNEYS/BLADDER: No significant mass, stones, or hydronephrosis.     BOWEL: No evidence of bowel obstruction. Moderate colonic stool  burden. Normal caliber appendix. Duodenal diverticulum. There is mild  diffuse wall thickening and hyperenhancement of the distal ileum  including the terminal ileum.     Postsurgical changes of ventral hernia repair without evidence  recurrence. Small amount of fluid subjacent to the ventral hernia  repair is mildly increased from prior and is favored postsurgical in  etiology. Mild inflammatory stranding of the anterior upper abdomen is  slightly decreased. No intra-abdominal abscess.     PELVIC ORGANS: Hysterectomy. No pelvic mass.     ADDITIONAL FINDINGS: No adenopathy in the abdomen or pelvis. Mild  burden of atherosclerotic disease without abdominal aortic aneurysm.  No pneumoperitoneum.     MUSCULOSKELETAL: Slightly decreased size of a fluid and fat collection  of the subcutaneous upper anterior abdominal wall which now measures  1.6 x 2.0 cm compared to 1.9 x 2.5 cm, likely reflecting evolving fat  necrosis.                                                                      IMPRESSION:   1.  Postsurgical changes ventral hernia repair without evidence for  recurrence. Evolving postsurgical inflammation of the upper abdomen.  There is a region of evolving fat necrosis in the subcutaneous fat of  the anterior abdominal wall which could correspond to patient's  palpable abnormality.  2.  Mild inflammatory changes of the distal ileum including the  terminal ileum which may represent a nonspecific ileitis.         5/13/24 Chest  x-ray  IMPRESSION: No acute cardiopulmonary abnormality.      Stable appearance of 3 mm nodular density along the right mid lung.  Findings may reflect a small pulmonary nodule or calcified granuloma.  If further characterization is needed, consider nonemergent follow-up  CT chest exam.        ROS: 10pt ROS performed and otherwise negative.     PAST MEDICAL HISTORY:  Past Medical History           Past Medical History:   Diagnosis Date    Abdominal pain, right lower quadrant 03/09/2008     Admit. Discharged 03/10/08    Anxiety attack 07/31/2015    Atypical chest pain 06/23/2015    De Quervain's disease (tenosynovitis)      Dehydration      Depressive disorder 1996    Gastric ulcer 07/31/2015    GERD (gastroesophageal reflux disease) 07/28/2010     Takes omeprazole and metoclopramide     Hypertension 2017    Ingrowing nail 01/09/2014    Migraines      Opioid dependence in remission (H) 08/02/2015    Other and unspecified ovarian cyst      Papanicolaou smear of cervix with low grade squamous intraepithelial lesion (LGSIL) 07/07/2017            PREVIOUS ABDOMINAL/GYNECOLOGIC SURGERIES:  Past Surgical History             Past Surgical History:   Procedure Laterality Date    BIOPSY CERVICAL, LOCAL EXCISION, SINGLE/MULTIPLE N/A 8/10/2017     Procedure: BIOPSY CERVICAL, LOCAL EXCISION, SINGLE/MULTIPLE;;  Surgeon: Michael Chandler MD;  [facility]    COLONOSCOPY N/A 1/6/2023     Procedure: COLONOSCOPY;  Surgeon: Michael Dozier MD;  [facility]    COLPOSCOPY, BIOPSY, COMBINED N/A 8/10/2017     Procedure: COMBINED COLPOSCOPY, BIOPSY;  Colposcopy with Cervical Biopsies and Endometrial Biopsy, Exam with Ultrasound;  Surgeon: Michael Chandler MD;  [facility]    ESOPHAGOSCOPY, GASTROSCOPY, DUODENOSCOPY (EGD), COMBINED N/A 4/17/2017     Procedure: COMBINED ESOPHAGOSCOPY, GASTROSCOPY, DUODENOSCOPY (EGD);  Surgeon: Ibrahima Esposito MD;  [facility]    ESOPHAGOSCOPY, GASTROSCOPY, DUODENOSCOPY  (EGD), COMBINED N/A 1/6/2023     Procedure: ESOPHAGOGASTRODUODENOSCOPY, WITH BIOPSY;  Surgeon: Michael Dozier MD;  [facility]    ESOPHAGOSCOPY, GASTROSCOPY, DUODENOSCOPY (EGD), COMBINED N/A 10/3/2023     Procedure: ESOPHAGOGASTRODUODENOSCOPY, WITH BIOPSY;  Surgeon: John Paul Varela MD;  [facility]    EXAM UNDER ANESTHESIA PELVIC N/A 8/10/2017     Procedure: EXAM UNDER ANESTHESIA PELVIC;;  Surgeon: Michael Chandler MD;  [facility]    HERNIORRHAPHY, INCISIONAL, ROBOT-ASSISTED, LAPAROSCOPIC, USING DA JERRELL XI N/A 8/30/2024     Procedure: HERNIORRHAPHY, INCISIONAL, ROBOT-ASSISTED, LAPAROSCOPIC, USING DA JERRELL XI;  Surgeon: Michael Dozier MD;  [facility]    HYSTERECTOMY        HYSTERECTOMY, PAP NO LONGER INDICATED        INJECT EPIDURAL CERVICAL N/A 10/20/2023     Procedure: Cervical 6-7 Interlaminar epidural steroid injection using fluoroscopic guidance with contrast dye.;  Surgeon: Trevor Ivory MD;  [facility]    INJECT EPIDURAL CERVICAL N/A 4/19/2024     Procedure: Cervical 6 - Cervical 7 Interlaminar epidural steroid injection using fluoroscopic guidance with contrast dye.;  Surgeon: Trevor Ivory MD;  [facility]    INJECT EPIDURAL LUMBAR Bilateral 7/1/2022     Procedure: Lumbar 5-Sacral 1 Transforaminal Epidural Steroid Injection with fluoroscopic guidance and contrast, bilateral;  Surgeon: Trevor Ivory MD;  [facility]    LAPAROSCOPIC CHOLECYSTECTOMY N/A 2/2/2018     Procedure: LAPAROSCOPIC CHOLECYSTECTOMY;  Laparoscopic Cholecystectomy;  Surgeon: Tigre Lowry DO;  [facility]    LAPAROSCOPIC HYSTERECTOMY TOTAL N/A 10/30/2017     Procedure: LAPAROSCOPIC HYSTERECTOMY TOTAL;  LAPAROSCOPIC HYSTERECTOMY TOTAL POSSIBLE SALPINGO-OOPHERECTOMY (BILATERAL);  Surgeon: Michael Chandler MD;  [facility]    [facility] UGI ENDOSCOPY, SIMPLE EXAM   01/07/08               PERTINENT MEDICATIONS:  Current Outpatient Prescriptions               Current  Outpatient Medications   Medication Sig Dispense Refill    acetaminophen (TYLENOL) 500 MG tablet Take 1,000 mg by mouth every 6 hours as needed for mild pain        ALPRAZolam (XANAX) 0.5 MG tablet          baclofen (LIORESAL) 20 MG tablet Take 1 tablet (20 mg) by mouth 3 times daily 90 tablet 1    bisacodyl (DULCOLAX) 5 MG EC tablet Take 1 tablet (5 mg) by mouth every other day Take every other day. If no bowel movement for 2 or more days, take 2 tablets of bisacodyl (10 mg) 30 tablet 2    buprenorphine HCl-naloxone HCl (SUBOXONE) 2-0.5 MG per film Place 0.5 Film under the tongue daily         busPIRone HCl (BUSPAR) 30 MG tablet 30 mg 3 times daily        CONSTULOSE 10 GM/15ML solution TAKE 15 MLS (10 G) BY MOUTH 3 TIMES DAILY AS NEEDED FOR CONSTIPATION (AS NEEDED FOR SEVERE CONSTIPAITON, NO BM FOR MORE THAN 3 DAYS AND FEELING CONSTIPATED) 300 mL 3    cyclobenzaprine (FLEXERIL) 5 MG tablet TAKE ONE TO TWO TABLETS BY MOUTH THREE TIMES A DAY AS NEEDED FOR MUSCLE SPASMS 90 tablet 5    DULoxetine (CYMBALTA) 60 MG capsule Take 1 capsule (60 mg) by mouth 2 times daily. 180 capsule 0    famotidine (PEPCID) 40 MG tablet Take 1 tablet (40 mg) by mouth 2 times daily 60 tablet 1    linaclotide (LINZESS) 72 MCG capsule Take 1 capsule (72 mcg) by mouth every morning (before breakfast). 30 capsule 11    medical cannabis (Patient's own supply) See Admin Instructions. Has [place] Medical Cannabis Card- Purchases through [facility].   (The purpose of this order is to document that the patient reports taking medical cannabis.  This is not a prescription, and is not used to certify that the patient has a qualifying medical condition.)        ondansetron (ZOFRAN ODT) 4 MG ODT tab DISSOLVE ONE TABLET ON TONGUE EVERY 6 HOURS AS NEEDED FOR NAUSEA 20 tablet 1    oxyCODONE (ROXICODONE) 5 MG tablet Take 1-2 tablets (5-10 mg) by mouth every 6 hours as needed for pain. 15 tablet 0    pantoprazole (PROTONIX) 40 MG EC tablet TAKE ONE  TABLET BY MOUTH TWICE A DAY WITH MEALS 60 tablet 5    propranolol (INDERAL) 10 MG tablet          rOPINIRole (REQUIP) 1 MG tablet TAKE ONE TABLET BY MOUTH EVERY NIGHT AT BEDTIME 90 tablet 0    simvastatin (ZOCOR) 10 MG tablet TAKE ONE TABLET BY MOUTH EVERY NIGHT AT BEDTIME 90 tablet 1    temazepam (RESTORIL) 15 MG capsule                     SOCIAL HISTORY:  Social History   Social History                Socioeconomic History    Marital status: Single       Spouse name: Not on file    Number of children: Not on file    Years of education: Not on file    Highest education level: Not on file   Occupational History    Not on file   Tobacco Use    Smoking status: Every Day       Current packs/day: 0.25       Average packs/day: 0.3 packs/day for 20.0 years (5.0 ttl pk-yrs)       Types: Cigarettes       Passive exposure: Never    Smokeless tobacco: Never   Vaping Use    Vaping status: Former    Substances: Nicotine, CBD    Devices: Refillable tank   Substance and Sexual Activity    Alcohol use: Yes       Comment: Occasionaly    Drug use: No    Sexual activity: Not Currently       Partners: Male       Birth control/protection: Female Surgical   Other Topics Concern    Parent/sibling w/ CABG, MI or angioplasty before 65F 55M? No   Social History Narrative    Not on file      Social Determinants of Health              Financial Resource Strain: Low Risk  (1/8/2024)     Financial Resource Strain      Within the past 12 months, have you or your family members you live with been unable to get utilities (heat, electricity) when it was really needed?: No   Food Insecurity: Low Risk  (1/8/2024)     Food Insecurity      Within the past 12 months, did you worry that your food would run out before you got money to buy more?: No      Within the past 12 months, did the food you bought just not last and you didn t have money to get more?: No   Transportation Needs: High Risk (1/8/2024)     Transportation Needs      Within the past 12  months, has lack of transportation kept you from medical appointments, getting your medicines, non-medical meetings or appointments, work, or from getting things that you need?: Yes   Physical Activity: Not on file   Stress: Not on file   Social Connections: Unknown (2022)     Received from [facility], [facility]     Social Connections      Frequency of Communication with Friends and Family: Not on file   Interpersonal Safety: Low Risk  (2024)     Interpersonal Safety      Do you feel physically and emotionally safe where you currently live?: Yes      Within the past 12 months, have you been hit, slapped, kicked or otherwise physically hurt by someone?: No      Within the past 12 months, have you been humiliated or emotionally abused in other ways by your partner or ex-partner?: No   Recent Concern: Interpersonal Safety - High Risk (2024)     Interpersonal Safety      Do you feel physically and emotionally safe where you currently live?: No      Within the past 12 months, have you been hit, slapped, kicked or otherwise physically hurt by someone?: No      Within the past 12 months, have you been humiliated or emotionally abused in other ways by your partner or ex-partner?: No   Housing Stability: Low Risk  (2024)     Housing Stability      Do you have housing? : Yes      Are you worried about losing your housing?: No            FAMILY HISTORY:  Denies colon/panc/esophageal/other GI CA, no other Anderson or other HPS-related Lenka. No IBD/celiac, no other AI/liver/thyroid disease.     Family History             Family History   Problem Relation Age of Onset    Depression Mother      Respiratory Mother      Chronic Obstructive Pulmonary Disease Mother      Hypertension Mother      Anxiety Disorder Mother      Cerebrovascular Disease Father           First stroke at age 28,  from 2nd when he was 55. Brain aneurysms.    Breast Cancer  Cousin      Adrenal Disorder Other      Chronic Obstructive Pulmonary Disease Other      Asthma Brother              PHYSICAL EXAMINATION:  Vitals reviewed   General: Patient appears well in no acute distress.    Skin: No visualized rash or lesions on visualized skin  HEENT:    EOMI, no erythema, sclera icterus or discharge noted.  Mouth mucosa intact, pink, moist  No cervical or supraclavicular lymphadenopathy. Thyroid gland not enlarged.  Resp: breathing comfortably without accessory muscle usage, speaking in full sentences, no cough. Lung sounds clear  Card: Regular and rhythmic S1 and S2. No gallop or rub. No murmur.  No LE edema.  Abdomen: Active bowel sounds X 4 quadrants. Soft to palpation. Tenderness in the RLQ. Reported pain around the midline surgical incision of the upper abdomen. There is a firm round mobile subcutaneous nodule, approximately 1.5 cm in d.   No guarding or rebound tenderness. Garcia's sign negative.  MSK: Appears to have normal range of motion based on visualized movements  Neurologic: No apparent tremors, facial movements symmetric  Psych: affect normal, alert and oriented        ASSESSMENT/PLAN:       ICD-10-CM    1. Gastroesophageal reflux disease with esophagitis without hemorrhage  K21.00       2. Slow transit constipation  K59.01       3. Abdominal mass, unspecified abdominal location  R19.00       4. Hiatal hernia  K44.9       5. Chronic pain syndrome  G89.4          48 year old female  presented to GI clinic for a follow up. She was sent to upper GI endoscopy and BRAVO study for persistent acid reflux and sensation that food and pills are getting stuck in her throat. Findings were discussed with the patient. 3 cm hiatal hernia was noted with Hill grade IV GE flap. There was erosive duodenitis. Normal appearance of esophageal mucosa, no abnormalities to explain the patient's complain of dysphagia. Decided to proceed with therapeutic dilation.Patient does not recall if her symptoms  "had improved after the procedure. Stated that she has been dealing with lots of health issues and feeling overwhelmed.     We reviewed her BRAVO report, which was inconclusive. The overall % acid exposure of 3.9% was not elevated. The DeMeester was 13.6 overall which is not elevated.  There were 32 occurrences of heartburn, 13 of cough, 12 of regurgitation symptoms. SI was 21.9 (7/32), 23.1 (3/13), 33.3 (4/12) respectively overall for acid reflux which does not suggest a correlation. The SAP was 99.9, 95.2, 99.9 respectively which suggests a correlation. Patient stated that she \"probably, pushed buttons too often\" because of overall discomfort in her abdomen and not necessary related to acid reflux. Stated that she is satisfied with her reflux management at this time. Will continue pantoprazole twice a day. Suggested to take famotidine before bed when having  episodes acid reflux. Reminded on GERD friendly diet. Patient stated that she reduced consumption of coffee.      Regarding postoperative pain in upper abdomen, abdominal wall pain vs nerve injury is suspected.  High likelihood of hyperalgesia and chronic pain syndrome contributing to the patient's symptoms. Patient was prescribed nortriptyline but stopped it after taking a few doses, complaining of vivid dreams. Not interested in gabapentin or pregabalin due to side effects of weight gain. Agrees to continue Cymbalta. Will refer to IR for a trial of trigger point injections. Advised to try topicals such as lidocaine patches or diclofenac cream for pain management.  Recommended to continue working with her provider at pain clinic.  If pain persist, may order a follow up imaging study.  May refer to colonoscopy if continues having RLQ pain although per clinical presentation, it is more consistent with referred pain from low back and/or hip.  I suggested to optimize constipation management. She tried bisacodyl and Miralax before with very minimal improvement in " constipation. Used enema and suppository at times.  Had inconclusive colonoscopy report due to poor bowel prep last year.  Recommended to repeat the study in 3 to 5 years given the family history of colonic polyps in mother and maternal grandmother. Patient stated that taking linaclotide daily helps. She does not remembers to take Miralax every day.  Advised to continue both medications.  Patient verbalized understanding and appreciation of care provided. Stated that all of the questions were answered to her/his satisfaction.  RTC in 3 months     Thank you for this consultation. It was a pleasure to participate in the care of this patient; please contact us with any further questions.     JEANNIE Ponce, FNP-C  Division of Gastroenterology  [facility], [place], [place]     This note was created with Dragon voice recognition software, and while reviewed for accuracy, inadvertent minor typographic errors may occur. Please contact the provider if you have any questions.

## 2024-10-27 ENCOUNTER — MYC MEDICAL ADVICE (OUTPATIENT)
Dept: ORTHOPEDICS | Facility: CLINIC | Age: 49
End: 2024-10-27
Payer: COMMERCIAL

## 2024-10-27 ASSESSMENT — ANXIETY QUESTIONNAIRES
GAD7 TOTAL SCORE: 20
5. BEING SO RESTLESS THAT IT IS HARD TO SIT STILL: NEARLY EVERY DAY
7. FEELING AFRAID AS IF SOMETHING AWFUL MIGHT HAPPEN: MORE THAN HALF THE DAYS
3. WORRYING TOO MUCH ABOUT DIFFERENT THINGS: NEARLY EVERY DAY
GAD7 TOTAL SCORE: 20
7. FEELING AFRAID AS IF SOMETHING AWFUL MIGHT HAPPEN: MORE THAN HALF THE DAYS
1. FEELING NERVOUS, ANXIOUS, OR ON EDGE: NEARLY EVERY DAY
4. TROUBLE RELAXING: NEARLY EVERY DAY
2. NOT BEING ABLE TO STOP OR CONTROL WORRYING: NEARLY EVERY DAY
8. IF YOU CHECKED OFF ANY PROBLEMS, HOW DIFFICULT HAVE THESE MADE IT FOR YOU TO DO YOUR WORK, TAKE CARE OF THINGS AT HOME, OR GET ALONG WITH OTHER PEOPLE?: EXTREMELY DIFFICULT
GAD7 TOTAL SCORE: 20
IF YOU CHECKED OFF ANY PROBLEMS ON THIS QUESTIONNAIRE, HOW DIFFICULT HAVE THESE PROBLEMS MADE IT FOR YOU TO DO YOUR WORK, TAKE CARE OF THINGS AT HOME, OR GET ALONG WITH OTHER PEOPLE: EXTREMELY DIFFICULT
6. BECOMING EASILY ANNOYED OR IRRITABLE: NEARLY EVERY DAY

## 2024-10-27 ASSESSMENT — PAIN SCALES - PAIN ENJOYMENT GENERAL ACTIVITY SCALE (PEG)
PEG_TOTALSCORE: 9
AVG_PAIN_PASTWEEK: 7
INTERFERED_ENJOYMENT_LIFE: 10 - COMPLETELY INTERFERES
INTERFERED_GENERAL_ACTIVITY: 10 - COMPLETELY INTERFERES

## 2024-10-28 ENCOUNTER — VIRTUAL VISIT (OUTPATIENT)
Dept: PSYCHOLOGY | Facility: CLINIC | Age: 49
End: 2024-10-28
Payer: COMMERCIAL

## 2024-10-28 ENCOUNTER — MYC MEDICAL ADVICE (OUTPATIENT)
Dept: ANESTHESIOLOGY | Facility: CLINIC | Age: 49
End: 2024-10-28

## 2024-10-28 ENCOUNTER — VIRTUAL VISIT (OUTPATIENT)
Dept: ANESTHESIOLOGY | Facility: CLINIC | Age: 49
End: 2024-10-28
Payer: COMMERCIAL

## 2024-10-28 DIAGNOSIS — G89.29 CHRONIC NECK PAIN: ICD-10-CM

## 2024-10-28 DIAGNOSIS — F33.1 MODERATE EPISODE OF RECURRENT MAJOR DEPRESSIVE DISORDER (H): Primary | ICD-10-CM

## 2024-10-28 DIAGNOSIS — F41.1 GENERALIZED ANXIETY DISORDER: ICD-10-CM

## 2024-10-28 DIAGNOSIS — M47.812 ARTHROPATHY OF CERVICAL FACET JOINT: ICD-10-CM

## 2024-10-28 DIAGNOSIS — M79.18 MYOFASCIAL PAIN: ICD-10-CM

## 2024-10-28 DIAGNOSIS — M53.3 PAIN OF BOTH SACROILIAC JOINTS: ICD-10-CM

## 2024-10-28 DIAGNOSIS — G89.4 CHRONIC PAIN SYNDROME: Primary | ICD-10-CM

## 2024-10-28 DIAGNOSIS — G89.29 CHRONIC BILATERAL LOW BACK PAIN WITH BILATERAL SCIATICA: ICD-10-CM

## 2024-10-28 DIAGNOSIS — M54.2 CHRONIC NECK PAIN: ICD-10-CM

## 2024-10-28 DIAGNOSIS — M54.42 CHRONIC BILATERAL LOW BACK PAIN WITH BILATERAL SCIATICA: ICD-10-CM

## 2024-10-28 DIAGNOSIS — M54.41 CHRONIC BILATERAL LOW BACK PAIN WITH BILATERAL SCIATICA: ICD-10-CM

## 2024-10-28 PROCEDURE — 99215 OFFICE O/P EST HI 40 MIN: CPT | Mod: 95

## 2024-10-28 PROCEDURE — 90834 PSYTX W PT 45 MINUTES: CPT | Mod: 95 | Performed by: COUNSELOR

## 2024-10-28 ASSESSMENT — PAIN SCALES - PAIN ENJOYMENT GENERAL ACTIVITY SCALE (PEG)
AVG_PAIN_PASTWEEK: 7
AVG_PAIN_PASTWEEK: 7
INTERFERED_ENJOYMENT_LIFE: 10
INTERFERED_GENERAL_ACTIVITY: 10 - COMPLETELY INTERFERES
PEG_TOTALSCORE: 9
INTERFERED_ENJOYMENT_LIFE: 10 - COMPLETELY INTERFERES
INTERFERED_GENERAL_ACTIVITY: 10
PEG_TOTALSCORE: 9

## 2024-10-28 ASSESSMENT — PAIN SCALES - GENERAL: PAINLEVEL_OUTOF10: SEVERE PAIN (7)

## 2024-10-28 NOTE — NURSING NOTE
Patient presents with:  Pain  RECHECK: Follow up- medication/pain      Severe Pain (7)           What medications are you using for pain? Tylenol, Voltaren gel, baclofen, cymbalta, cyclobenzaprine    What refills are you needing today? none    Expectations: to talk about nerve pain          How will you be connecting to the visit? Mychart  How would you like to obtain your AVS? MyChart  If the video visit is dropped, the invitation should be resent by: Text to cell phone: [phone]  Will anyone else be joining your video visit? No  Are you currently in the [facility] yes    If patient encounters technical issues they should call [phone]

## 2024-10-28 NOTE — Clinical Note
Hey Team,  I recommended in person follow 2-4 weeks after SI joint injections (DOS TBD), and I want to make sure she gets scheduled in [place] (she was scheduled at [facility] in person today and converted to video).   Thanks, Jenny

## 2024-10-28 NOTE — PATIENT INSTRUCTIONS
Medication Management:   Nortriptyline - tried taking 10 mg at night, reports vivid dreams and hx of PTSD. Stopped taking after 4 nights, improvement with vivid dreams.   Diclofenac gel to both hips 3-4 x daily. Advised to use consistently and may take 1-2 weeks to notice benefit. If prescription not covered by insurance, may purchase over the counter.   Duloxetine -  Current dose 60 mg twice daily. No reported side effects. Will discuss outcome with most recent dose adjustment at future follow up.   Muscle relaxants:   Continue baclofen 20 mg 3 times daily.  Reports enhanced effect with adding afternoon dose, mild daytime sedation effects are manageable. Recommended taking baclofen and buspirone at separate times.   Flexeril - 5-10 mg twice daily as needed. PCP managing. Recommend reducing use to once daily as needed if helpful on days with increased pain.   Allow 4-6 hours in between all muscle relaxant doses, do not mix with alcohol.   Medical cannabis - certified for [place] medical cannabis program at last visit on 1/3/24. No clear benefit for pain with daytime products, some benefit at night with products used at bedtime.       Potential procedures:   Bilateral SI joint injections recommended at last visit. Orders placed to schedule with Dr. Reynaga. Will having scheduling team   Bilateral cervical 3,4,5 medial branch nerve block #1 on 6/27/24 and MBB #2 on 10/24/24. Reports up to 80% pain relief for a few hours, pain returned to baseline by the next day after procedures. Orders placed to schedule RFA.   Will discuss higher cervical pain with Dr. Reynaga, possibly target C2 medial branch?      Other Therapies/Orders/Referrals:   TENS unit - continue use throughout day as needed.      Follow up with JEANNIE Schroeder CNP around 3-4 weeks after SI joint injections

## 2024-10-28 NOTE — PROGRESS NOTES
Answers submitted by the patient for this visit:  Patient Health Questionnaire (Submitted on 10/27/2024)  If you checked off any problems, how difficult have these problems made it for you to do your work, take care of things at home, or get along with other people?: Extremely difficult  PHQ9 TOTAL SCORE: 23  Patient Health Questionnaire (G7) (Submitted on 10/27/2024)  BO 7 TOTAL SCORE: 20          [facility]                                     Progress Note    Patient Name: Radha Beckwith  Date: 10/28/24         Service Type: Individual      Session Start Time: 3:30pm Session End Time:  4:20pm     Session Length:  50    Session #: 57    Attendees: Client    Service Modality:  video      Provider verified identity through the following two step process.  Patient provided:  Patient is known previously to provider    Telemedicine Visit: The patient's condition can be safely assessed and treated via synchronous audio and visual telemedicine encounter.      Reason for Telemedicine Visit: Patient convenience (e.g. access to timely appointments / distance to available provider)    Originating Site (Patient Location): Patient's home    Distant Site (Provider Location): Provider Remote Setting- Home Office    Consent:  The patient/guardian has verbally consented to: the potential risks and benefits of telemedicine (video visit) versus in person care; bill my insurance or make self-payment for services provided; and responsibility for payment of non-covered services.     Patient would like the video invitation sent by:  My Chart    Mode of Communication:  video    Distant Location (Provider):  Off-site home office    As the provider I attest to compliance with applicable laws and regulations related to telemedicine.    DATA  Interactive Complexity: No  Crisis: No        Progress Since Last Session (Related to Symptoms / Goals / Homework):   Symptoms: No change .    Homework: Completed in  session      Episode of Care Goals: Satisfactory progress - MAINTENANCE (Working to maintain change, with risk of relapse); Intervened by continuing to positively reinforce healthy behavior choice      Current / Ongoing Stressors and Concerns:   Client stated the garage sale really wore her down.   Feels like she's a hoarder because she can't get rid of things.   Last Thursday she had her injections in her neck. Going forward they are going to go with a cervical oblation.        Treatment Objective(s) Addressed in This Session:   Increase interest, engagement, and pleasure in doing things  Feel less tired and more energy during the day        Intervention:   Validated client's frustrations about her medical procedures and her medical burn out. Discussed her thoughts about her being a hoarder and clarified what it really is. Discussed ways she can be more effective around getting rid of stuff and who she could ask for help with it.     Assessments completed prior to visit:  The following assessments were completed by patient for this visit:  PHQ9:       8/28/2024     2:05 PM 9/16/2024     3:26 PM 9/23/2024    11:46 AM 10/3/2024     2:00 PM 10/7/2024    10:50 AM 10/14/2024     3:26 PM 10/27/2024     7:57 PM   PHQ-9 SCORE   PHQ-9 Total Score MyChart 22 (Severe depression) 22 (Severe depression) 25 (Severe depression) 22 (Severe depression) 24 (Severe depression) 23 (Severe depression) 23 (Severe depression)   PHQ-9 Total Score 22 22 25 22 24 23 23        Patient-reported     GAD7:       7/1/2024     2:51 PM 7/25/2024    11:52 AM 8/13/2024     5:35 PM 8/28/2024     2:06 PM 9/16/2024     3:27 PM 10/3/2024     1:58 PM 10/27/2024     7:59 PM   BO-7 SCORE   Total Score 19 (severe anxiety) 21 (severe anxiety) 19 (severe anxiety) 21 (severe anxiety) 20 (severe anxiety) 20 (severe anxiety) 20 (severe anxiety)   Total Score 19 21 19    19 21 20    20 20    20 20        Patient-reported    Multiple values from one day are  sorted in reverse-chronological order     PROMIS 10-Global Health (all questions and answers displayed):       4/10/2024     1:58 PM 4/17/2024     1:28 PM 7/25/2024    11:56 AM 8/4/2024     7:20 PM 8/10/2024     2:51 PM 8/18/2024    11:36 AM 8/28/2024     2:07 PM   PROMIS 10   In general, would you say your health is: Poor Poor Poor Poor Fair Poor Poor   In general, would you say your quality of life is: Poor Poor Fair Poor Poor Fair Fair   In general, how would you rate your physical health? Poor Poor Poor Poor Poor Poor Fair   In general, how would you rate your mental health, including your mood and your ability to think? Poor Poor Poor Poor Poor Poor Poor   In general, how would you rate your satisfaction with your social activities and relationships? Poor Poor Poor Fair Poor Fair Fair   In general, please rate how well you carry out your usual social activities and roles Fair Poor Poor Poor Poor Fair Fair   To what extent are you able to carry out your everyday physical activities such as walking, climbing stairs, carrying groceries, or moving a chair? A little A little A little Moderately A little A little A little   In the past 7 days, how often have you been bothered by emotional problems such as feeling anxious, depressed, or irritable? Always Always Always Always Always Always Always   In the past 7 days, how would you rate your fatigue on average? Severe Very severe Severe Severe Very severe Very severe Severe   In the past 7 days, how would you rate your pain on average, where 0 means no pain, and 10 means worst imaginable pain? 7 7 7 7 7 7 7   In general, would you say your health is: 1  1  1  1  2  1  1    In general, would you say your quality of life is: 1  1  2  1  1  2  2    In general, how would you rate your physical health? 1  1  1  1  1  1  2    In general, how would you rate your mental health, including your mood and your ability to think? 1  1  1  1  1  1  1    In general, how would you rate  your satisfaction with your social activities and relationships? 1  1  1  2  1  2  2    In general, please rate how well you carry out your usual social activities and roles. (This includes activities at home, at work and in your community, and responsibilities as a parent, child, spouse, employee, friend, etc.) 2  1  1  1  1  2  2    To what extent are you able to carry out your everyday physical activities such as walking, climbing stairs, carrying groceries, or moving a chair? 2  2  2  3  2  2  2    In the past 7 days, how often have you been bothered by emotional problems such as feeling anxious, depressed, or irritable? 5  5  5  5  5  5  5    In the past 7 days, how would you rate your fatigue on average? 4  5  4  4  5  5  4    In the past 7 days, how would you rate your pain on average, where 0 means no pain, and 10 means worst imaginable pain? 7  7  7  7  7  7  7    Global Mental Health Score 4    4 4    4 5    5 5 4    4 6 6   Global Physical Health Score 7    7 6    6 7    7 8 6    6 6 8   PROMIS TOTAL - SUBSCORES 11    11 10    10 12    12 13 10    10 12 14       Patient-reported    Multiple values from one day are sorted in reverse-chronological order     [place] Suicide Severity Rating Scale (Lifetime/Recent)      5/13/2024     3:13 PM 8/21/2024    10:30 AM 8/30/2024     8:20 AM 8/31/2024    12:07 PM 9/5/2024     5:33 PM 9/13/2024    10:52 AM 10/4/2024     4:54 PM   [place] Suicide Severity Rating (Lifetime/Recent)   Q1 Wished to be Dead (Past Month) 0-->no 0-->no 0-->no 0-->no 0-->no 0-->no 0-->no   Q2 Suicidal Thoughts (Past Month) 0-->no 0-->no 0-->no 0-->no 0-->no 0-->no 0-->no   Q6 Suicide Behavior (Lifetime) 0-->no 0-->no 0-->no 0-->no 1-->yes 0-->no 0-->no   If yes to Q6, within past 3 months?     0-->no     Level of Risk per Screen no risks indicated no risks indicated no risks indicated no risks indicated moderate risk no risks indicated no risks indicated         ASSESSMENT: Current Emotional  / Mental Status (status of significant symptoms):   Risk status (Self / Other harm or suicidal ideation)   Patient denies current fears or concerns for personal safety.   Patient denies current or recent suicidal ideation or behaviors.   Patient denies current or recent homicidal ideation or behaviors.   Patient denies current or recent self injurious behavior or ideation.   Patient denies other safety concerns.   Patient reports there has been no change in risk factors since their last session.     Patient reports there has been no change in protective factors since their last session.     Recommended that patient call 911 or go to the local ED should there be a change in any of these risk factors.     Appearance:   appropriate    Eye Contact:   good    Psychomotor Behavior: Normal    Attitude:   Cooperative    Orientation:   All   Speech    Rate / Production: Normal/ Responsive Normal     Volume:  Normal    Mood:    Normal   Affect:    Appropriate    Thought Content:  Clear    Thought Form:  Coherent    Insight:    Good      Medication Review:   No changes to current psychiatric medication(s)     Medication Compliance:   Yes     Changes in Health Issues:   None reported     Chemical Use Review:   Substance Use: Chemical use reviewed, no active concerns identified      Tobacco Use: No change in amount of tobacco use since last session.  Patient declined discussion at this time    Diagnosis:  1. Moderate episode of recurrent major depressive disorder (H)    2. Generalized anxiety disorder            Collateral Reports Completed:   Not Applicable    PLAN: (Patient Tasks / Therapist Tasks / Other)  Continue to utilize stress reduction skills daily.   Work on self care and self deanne.        [facility]                                                         ______________________________________________________________________    Individual Treatment Plan    Patient's Name: Radha Beckwith  Date Of  Birth: 1975    Date of Creation: 6/28/23  Date Treatment Plan Last Reviewed/Revised: 10/14/24    DSM5 Diagnoses: 296.32 (F33.1) Major Depressive Disorder, Recurrent Episode, Moderate _  Psychosocial / Contextual Factors: living with boyfriend, memory issues  PROMIS (reviewed every 90 days):     Referral / Collaboration:  Referral to another professional/service is not indicated at this time..    Anticipated number of session for this episode of care: 9-12 sessions  Anticipation frequency of session:  as needed  Anticipated Duration of each session: 38-52 minutes  Treatment plan will be reviewed in 90 days or when goals have been changed.       MeasurableTreatment Goal(s) related to diagnosis / functional impairment(s)  Goal 1: Patient will increase communication skills with family members.    Objective #A (Patient Action)    Patient will learn & utilize at least 2 assertive communication skills weekly.  Status: Continued - Date(s): 10/14/24    Intervention(s)  Therapist will teach emotional regulation skills. distress tolerance skills, interpersonal effectiveness skills, emotion regluation skills, mindfulness skills, radical acceptance. Therapist will teach client how to ID body cues for anxiety, anxiety reduction techniques, how to ID triggers for depression and anxiety- decrease reactivity/eliminate, lifestyle changes to reduce depression and anxiety, communication skills, explore cognitive beliefs and help client to develop healthy cognitive patterns and beliefs.    Objective #B  Patient will use thought-stopping strategy daily to reduce intrusive thoughts.  Status: Continued - Date(s): 10/14/24    Intervention(s)  Therapist will teach emotional regulation skills. distress tolerance skills, interpersonal effectiveness skills, emotion regluation skills, mindfulness skills, radical acceptance. Therapist will teach client how to ID body cues for anxiety, anxiety reduction techniques, how to ID triggers for  depression and anxiety- decrease reactivity/eliminate, lifestyle changes to reduce depression and anxiety, communication skills, explore cognitive beliefs and help client to develop healthy cognitive patterns and beliefs.      Goal 2: Patient will decrease depression symptoms.      Objective #A (Patient Action)    Status: Continued - Date(s): 10/14/24    Patient will Increase interest, engagement, and pleasure in doing things  Decrease frequency and intensity of feeling down, depressed, hopeless  Improve quantity and quality of night time sleep / decrease daytime naps  Feel less tired and more energy during the day   Improve diet, appetite, mindful eating, and / or meal planning  Identify negative self-talk and behaviors: challenge core beliefs, myths, and actions  Improve concentration, focus, and mindfulness in daily activities   Feel less fidgety, restless or slow in daily activities / interpersonal interactions.    Intervention(s)  Therapist will teach emotional regulation skills. distress tolerance skills, interpersonal effectiveness skills, emotion regluation skills, mindfulness skills, radical acceptance. Therapist will teach client how to ID body cues for anxiety, anxiety reduction techniques, how to ID triggers for depression and anxiety- decrease reactivity/eliminate, lifestyle changes to reduce depression and anxiety, communication skills, explore cognitive beliefs and help client to develop healthy cognitive patterns and beliefs.    Objective #B  Patient will identify three distraction and diversion activities and use those activities to decrease level of anxiety  .    Status: Continued - Date(s):  10/14/24    Intervention(s)  Therapist will teach emotional regulation skills. distress tolerance skills, interpersonal effectiveness skills, emotion regluation skills, mindfulness skills, radical acceptance. Therapist will teach client how to ID body cues for anxiety, anxiety reduction techniques, how to ID  triggers for depression and anxiety- decrease reactivity/eliminate, lifestyle changes to reduce depression and anxiety, communication skills, explore cognitive beliefs and help client to develop healthy cognitive patterns and beliefs.      Patient has reviewed and agreed to the above plan.      [facility]

## 2024-10-28 NOTE — PROGRESS NOTES
Pre procedure Diagnosis: Cervical spondylosis   Post procedure Diagnosis: Same  Procedure performed: cervical medial branch block bilateral c3,4,5  Anesthesia: none  Complications: none  Operators: King Reynaga MD    Indications:   Radha Beckwith is a 48 year old female. The patient has a history of axial neck pain.  Exam shows pain with extension/rotation and they have tried conservative treatment including meds/pt.    Options/alternatives, benefits and risks were discussed with the patient including but not limited to bleeding, infection, tissue trauma, exposure to radiation, reaction to medications, spinal cord injury, weakness, numbness and paralysis.  Questions were answered to her satisfaction and she wishes to proceed. Voluntary informed consent was obtained and signed.     Vitals were reviewed: Yes  Allergies were reviewed:  Yes   Medications were reviewed:  Yes   Pre-procedure pain score: 6/10    Procedure:  After obtaining signed, informed consent, the patient was taken to the procedure room and placed in the prone position on the [facility] frame.  A Pause for the Cause was completed prior to procedure start.  The patient was prepped and draped in the usual sterile fashion.    The areas of interest were identified with fluoroscopy.  The skin and subcutaneous tissue were anesthetized by injecting Lidocaine 1% 1 ml at each injection site utilizing a 27 gauge 1.25 inch needle.  Then,  25 gauge 3.5 inch spinal needles with slightly curved tips were advanced tangential to the medial branch nerves, abutting the articular pillars at C3,4,5 utilizing AP and Lateral fluoroscopic guidance.  Aspiration for blood and CSF was negative at all the levels.    Then, after repeated negative aspiration, each level was injected with 0.5 ml of 0.25% bupivacaine and the needles were restyletted and withdrawn.    The injection sites were cleaned and sterile dressings were applied where indicated.    The patient  tolerated the procedure well without complications and was taken to the recovery area for continued observation.  Fluoroscopic images were saved to PACS.    The patient was re-evaluated following the procedure and they noted dec pain and improved range of motion.    Post-procedure pain:  4/10    The patient was given a pain diary and instructed to document their pain throughout the day.  The patient was asked to return the pain diary the next day in person or by fax at which time it will be reviewed and the decision made whether or not to proceed.    Follow-up:  pending results     King Reynaga MD  [facility]

## 2024-10-28 NOTE — TELEPHONE ENCOUNTER
Pt had a bilateralCervical  medial branch block # 1 on 10/24/2024.  The post medial branch block form was received.    According to pain diary pt would like to proceed with medial branch block #2.  Max relief from block is:    Pt had moderate on the day of the block. (50 to 80%)  Physical therapy:    Last done in?:  2024.   How many sessions?:  27.    Where was it  done?  [facility].    If outside location does a release of information need to be signed? N/A   If pt has not done PT does it needs to be ordered/started in order to have the radiofrequency ablation? [place] please review and advise .    Routed to [place] to review. Does pt had enough relief insurance-wise to proceed to medial branch block #2?  If so please schedule.      Reema Gaffney RN

## 2024-10-28 NOTE — TELEPHONE ENCOUNTER
Per chart review:  -6/6/24 bilateral De Quervain's injections were completed   -8/22/24 last OV with Dr. Adame, bilateral De Quervain's steroid injections were performed     Patient has tried:  -2 steroid injections, medrol  dosepak, thumb spica braces, bilateral C3-5 MBB and KINDRA injections, Voltaren gel, Tylenol, Occupational hand therapy (several years ago)     Gisselle Sanchez,  ATC

## 2024-10-28 NOTE — PROGRESS NOTES
Video-Visit Details    Type of service:  Video Visit     Originating Location (pt. Location): Home    Distant Location (provider location):  On-site  Platform used for Video Visit: [facility]     Date of visit: 10/28/2024      Assessment:   Radha Beckwith is a 48 year old female with a past medical history significant for chronic bilateral low back pain, depression, SVT, opioid dependence in remission, anxiety, tobacco use, GERD, restless legs who presents with complaints of neck pain, mid and low back.      Low back pain - Onset of pain within last 2 years. Pain is mostly axial, though she does have intermittent radicular leg symptoms, occasionally extends below the knee. On exam, positive facet loading and positive KATHY, sacral thrust and Yeoman's bilaterally. Positive myofascial TTP, negative SLR and neuro exam WNL. Etiology is likely associated with multiple factors, including underlying degenerative changes of lumbar spine, facet and SIJ mediated components, with prominent overlying myofascial component.   Neck/thoracic back pain - Onset of pain within last 2 years. On exam, positive for myofascial TTP, most notably in lower thoracic paraspinals. Etiology is likely associated with multiple factors, including underlying degenerative changes, with prominent overlying myofascial component.      Assigned to [facility] nursing team.     Visit Diagnoses:  1. Chronic pain syndrome    2. Chronic neck pain    3. Arthropathy of cervical facet joint    4. Chronic bilateral low back pain with bilateral sciatica    5. Pain of both sacroiliac joints    6. Myofascial pain        Plan:  Diagnosis reviewed, treatment option addressed, and risk/benifits discussed.  Self-care instructions given.  I am recommending a multidisciplinary treatment plan to help this patient better manage their pain.      Pain Physical Therapy:  Prior PT for low back/SI and neck pain. Continues home exercise  program.      Pain Psychology: Previously worked with Karen Kohler PhD, last visit on 9/30/24.      Diagnostic Studies:  No new orders today.      Medication Management:   Nortriptyline - tried taking 10 mg at night, reports vivid dreams and hx of PTSD. Stopped taking after 4 nights, improvement with vivid dreams.   Diclofenac gel to both hips 3-4 x daily. Advised to use consistently and may take 1-2 weeks to notice benefit. If prescription not covered by insurance, may purchase over the counter.   Duloxetine -  Current dose 60 mg twice daily. No reported side effects. Will discuss outcome with most recent dose adjustment at future follow up.   Muscle relaxants:   Continue baclofen 20 mg 3 times daily.  Reports enhanced effect with adding afternoon dose, mild daytime sedation effects are manageable. Recommended taking baclofen and buspirone at separate times.   Flexeril - 5-10 mg twice daily as needed. PCP managing. Recommend reducing use to once daily as needed if helpful on days with increased pain.   Allow 4-6 hours in between all muscle relaxant doses, do not mix with alcohol.   [place] - [place] at last visit on 1/3/24. No clear benefit for pain with daytime products, some benefit at night with products used at bedtime.       Potential procedures:   Bilateral SI joint injections recommended at last visit. Orders placed to schedule with Dr. Reynaga. Will having scheduling team   Bilateral cervical 3,4,5 medial branch nerve block #1 on 6/27/24 and MBB #2 on 10/24/24. Reports up to 80% pain relief for a few hours, pain returned to baseline by the next day after procedures. Orders placed to schedule RFA.   Will discuss higher cervical pain with Dr. Reynaga, possibly target C2 medial branch?         Other Therapies/Orders/Referrals:   TENS unit - continue use throughout day as needed.      Follow up with JEANNIE Schroeder CNP around 3-4 weeks after SI joint injections        Review of Electronic Chart: Today I have also reviewed available medical information in the patient's medical record at [facility] ([facility]) and Care Everywhere (if available), including relevant provider notes, laboratory work, and imaging.     Jenny Landrum, NAOMI, APRN, AGNP-C  [facility]     -------------------------------------------------    Subjective:    Chief complaint:   Chief Complaint   Patient presents with    Pain    RECHECK     Follow up- medication/pain       Interval history:  Radha Beckwith is a 48 year old female last seen on 10/10/24.      Recommendations/plan at the last visit included:  Pain Physical Therapy:  Prior PT for low back/SI and neck pain. Continues HEP.      Pain Psychology: Previously worked with Karen Kohler PhD, last visit on 9/30/24.      Diagnostic Studies:  EKG 10/4/24 - QT/QTc WNL      Medication Management:   Nortriptyline - start 10 mg at bedtime and titrate to 20 mg dose as tolerated. Monitor for pain benefit, cautioned about potential sedation effects. Prescriptions sent to pharmacy today for 10 mg capsules.   Diclofenac gel to both hips 3-4 x daily. Advised to use consistently and may take 1-2 weeks to notice benefit. If prescription not covered by insurance, may purchase over the counter.   Duloxetine -  Current dose 60 mg twice daily, increased from 90 mg/day to 120 mg/day at recent visit. Will discuss outcome with dose adjustment at future follow up.   Muscle relaxants:   Continue baclofen 20 mg 3 times daily.  Reports enhanced effect with adding afternoon dose, mild daytime sedation effects are manageable. Recommended taking baclofen and buspirone at separate times.   Flexeril - 5-10 mg twice daily as needed. PCP managing. Recommend reducing use to once daily as needed if helpful on days with increased pain.   Allow 4-6 hours in between all muscle relaxant doses, do not mix with alcohol.   [place] at last visit on 1/3/24. No clear benefit for pain with daytime products, some benefit at night with products used at bedtime.       Potential procedures:   Bilateral SI joint injections recommended today - positive bilateral KATHY, sacral thrust, and Yeoman's on repeat exam. Orders placed to schedule with Dr. Reynaga.   Bilateral cervical 3,4,5 medial branch nerve block #1 on 6/27/24. She is scheduled for MBB #2 on 10/24/24.        Other Therapies/Orders/Referrals:   TENS unit - continue use throughout day as needed.      Follow up with JEANNIE Schroeder CNP around 3-4 weeks after SI joint injections     Since her last visit, Radha Beckwith reports:  -She reports GT bursitis has not improved.   -Diclofenac gel recommended for GT bursitis, states she did not start using this on hips.   -She cannot tolerate PO NSAIDs due to GI upset.   -We discussed nortriptyline trial at last visit, states that she did not get started with this. She had vivid dreams and stopped use after 4 days. Dreams improved after she stopped use, states she continues to have PTSD related dreams but not as intense once off TCA.   -She had MBB #2 on 10/24/24. She reports up to 80% pain relief that lasted for a few hours. She reports similar results from MBB #1 in June, up to 80% relief for a few hours.   -We had discussed SI joint injections at last visit, but it does not appear she got scheduled yet. We will have schedulers contact her to set up appointment.     PEG: A Three-Item Scale Assessing Pain Intensity and Interference    What number best describes your PAIN ON AVERAGE in the past week? (Patient-Rptd) 7    What number best describes how, during the past week, pain has interfered with your ENJOYMENT OF LIFE? (Patient-Rptd) 10 - Completely interferes    What number best describes how, during the past week, pain has interfered with your GENERAL ACTIVITY? (Patient-Rptd) 10 - Completely interferes    PEG Total Score:  "(Patient-Rptd) 9    Fabian EE, Hayden KA, Karie MJ, Anjana TA, Álvarez J, Joyce JM, Ban SM, Brandie K. Development and initial validation of the PEG, a 3-item scale assessing pain intensity and interference. Journal of General Internal Medicine. 2009 [place];24:733-738.        HPI/Interval Hx from last visit:  -She had follow up with Dr. Dozier today, states this was very short and did not feel like she truly got what she was hoping/looking for.   -However, she did say that he did give her additional information.   -She was told this is a \"chronic pain\" now and was advised she can follow up with me for this.   -She reports epigastric pain around incisional site, pain worsens with bending forward. Also notes that she has tingling sensation and sensitivity to light tough (even from shirt).   -She notes that activity in general contributes to epigastric pain.   -She continues to have low back pain. She had mixed results with SI joint injections in the past.   -She is open to trial nortriptyline for abdominal pain.       Current Pain Treatments:                Suboxone 2-0.5 mg daily (weaning off)              Ropinirole 1 mg at bedtime               Cyclobenzaprine 5-10 mg QD PRN                                Baclofen 20 mg TID              Duloxetine 60 mg BID                 TENS              PT - HEP              CMBB/RFA - orders placed for RFA today               Bilateral SI joint injections ordered at [facility], not scheduled yet        Current MME: N/A     Review of [place] Prescription Monitoring Program (): No concern for abuse or misuse of controlled medications based on this report. Reviewed - appears appropriate.      Past pain treatments:  SI joint injection 5/16/23 -significant benefit x1 month, then diminishing efficacy  KINDRA on 4/19/24 with Dr. Ivory - marginal benefit   Gabapentin   Nortriptyline 10-20 mg at bedtime     Medications:  Current Outpatient Medications   Medication Sig Dispense Refill    " acetaminophen (TYLENOL) 500 MG tablet Take 1,000 mg by mouth every 6 hours as needed for mild pain      ALPRAZolam (XANAX) 0.5 MG tablet       baclofen (LIORESAL) 20 MG tablet Take 1 tablet (20 mg) by mouth 3 times daily 90 tablet 1    bisacodyl (DULCOLAX) 5 MG EC tablet Take 1 tablet (5 mg) by mouth every other day Take every other day. If no bowel movement for 2 or more days, take 2 tablets of bisacodyl (10 mg) 30 tablet 2    buprenorphine HCl-naloxone HCl (SUBOXONE) 2-0.5 MG per film Place 0.5 Film under the tongue daily       busPIRone HCl (BUSPAR) 30 MG tablet 30 mg 3 times daily      CONSTULOSE 10 GM/15ML solution TAKE 15 MLS (10 G) BY MOUTH 3 TIMES DAILY AS NEEDED FOR CONSTIPATION (AS NEEDED FOR SEVERE CONSTIPAITON, NO BM FOR MORE THAN 3 DAYS AND FEELING CONSTIPATED) 300 mL 3    cyclobenzaprine (FLEXERIL) 5 MG tablet TAKE ONE TO TWO TABLETS BY MOUTH THREE TIMES A DAY AS NEEDED FOR MUSCLE SPASMS 90 tablet 5    diclofenac (VOLTAREN) 1 % topical gel Apply 4 g topically 4 times daily. 350 g 2    DULoxetine (CYMBALTA) 60 MG capsule Take 1 capsule (60 mg) by mouth 2 times daily. 180 capsule 0    famotidine (PEPCID) 40 MG tablet Take 1 tablet (40 mg) by mouth 2 times daily 60 tablet 1    linaclotide (LINZESS) 72 MCG capsule Take 1 capsule (72 mcg) by mouth every morning (before breakfast). 30 capsule 11    medical cannabis (Patient's own supply) See Admin Instructions. Has [place] Medical Cann[place] Card- Purchases through [facility].   (The purpose of this order is to document that the patient reports taking medical cannabis.  This is not a prescription, and is not used to certify that the patient has a qualifying medical condition.)      ondansetron (ZOFRAN ODT) 4 MG ODT tab DISSOLVE ONE TABLET ON TONGUE EVERY 6 HOURS AS NEEDED FOR NAUSEA 20 tablet 1    pantoprazole (PROTONIX) 40 MG EC tablet TAKE ONE TABLET BY MOUTH TWICE A DAY WITH MEALS 60 tablet 5    rOPINIRole (REQUIP) 1 MG tablet TAKE ONE TABLET BY  MOUTH EVERY NIGHT AT BEDTIME 90 tablet 0    simvastatin (ZOCOR) 10 MG tablet TAKE ONE TABLET BY MOUTH EVERY NIGHT AT BEDTIME 90 tablet 1    temazepam (RESTORIL) 15 MG capsule       nortriptyline (PAMELOR) 10 MG capsule Start 10 mg at bedtime x 1 week, then increase to 20 mg at bedtime. 60 capsule 1       Medical History: any changes in medical history since they were last seen? No      Objective:    Physical Exam:  not currently breastfeeding.  GENERAL: alert and no distress  EYES: Eyes grossly normal to inspection.  No discharge or erythema, or obvious scleral/conjunctival abnormalities.  RESP: No audible wheeze, cough, or visible cyanosis.    SKIN: Visible skin clear. No significant rash, abnormal pigmentation or lesions.  NEURO: Cranial nerves grossly intact.  Mentation and speech appropriate for age.  PSYCH: Appropriate affect, tone, and pace of words    Diagnostic Tests/Imaging/Labs:  CMP on 10/4/24 - GFR >90 and hepatic function WNL    BILLING TIME DOCUMENTATION:   The total TIME spent on this patient on the date of the encounter/appointment was 42 minutes.      TOTAL TIME includes:   Time spent preparing to see the patient (reviewing records and tests)   Time spent face to face (or over the phone) with the patient   Time spent ordering tests, medications, procedures and referrals   Time spent Referring and communicating with other healthcare professionals   Time spent documenting clinical information in Epic

## 2024-10-28 NOTE — LETTER
10/28/2024       RE: Radha Beckwith  [address]  [facility] [address]     Dear Colleague,    Thank you for referring your patient, Radha Beckwith, to the [facility] FOR [facility]ENSIVE [facility]IN MANAGEMENT [place] at [facility]. Please see a copy of my visit note below.    Video-Visit Details    Type of service:  Video Visit     Originating Location (pt. Location): Home    Distant Location (provider location):  On-site  Platform used for Video Visit: [facility]     Date of visit: 10/28/2024      Assessment:   Radha Beckwith is a 48 year old female with a past medical history significant for chronic bilateral low back pain, depression, SVT, opioid dependence in remission, anxiety, tobacco use, GERD, restless legs who presents with complaints of neck pain, mid and low back.      Low back pain - Onset of pain within last 2 years. Pain is mostly axial, though she does have intermittent radicular leg symptoms, occasionally extends below the knee. On exam, positive facet loading and positive KATHY, sacral thrust and Yeoman's bilaterally. Positive myofascial TTP, negative SLR and neuro exam WNL. Etiology is likely associated with multiple factors, including underlying degenerative changes of lumbar spine, facet and SIJ mediated components, with prominent overlying myofascial component.   Neck/thoracic back pain - Onset of pain within last 2 years. On exam, positive for myofascial TTP, most notably in lower thoracic paraspinals. Etiology is likely associated with multiple factors, including underlying degenerative changes, with prominent overlying myofascial component.      Assigned to [place] nursing team.     Visit Diagnoses:  1. Chronic pain syndrome    2. Chronic neck pain    3. Arthropathy of cervical facet joint    4. Chronic bilateral low back pain with bilateral sciatica    5. Pain of both sacroiliac joints     6. Myofascial pain        Plan:  Diagnosis reviewed, treatment option addressed, and risk/benifits discussed.  Self-care instructions given.  I am recommending a multidisciplinary treatment plan to help this patient better manage their pain.      Pain Physical Therapy:  Prior PT for low back/SI and neck pain. Continues home exercise program.      Pain Psychology: Previously worked with Karen Kohler PhD, last visit on 9/30/24.      Diagnostic Studies:  No new orders today.      Medication Management:   Nortriptyline - tried taking 10 mg at night, reports vivid dreams and hx of PTSD. Stopped taking after 4 nights, improvement with vivid dreams.   Diclofenac gel to both hips 3-4 x daily. Advised to use consistently and may take 1-2 weeks to notice benefit. If prescription not covered by insurance, may purchase over the counter.   Duloxetine -  Current dose 60 mg twice daily. No reported side effects. Will discuss outcome with most recent dose adjustment at future follow up.   Muscle relaxants:   Continue baclofen 20 mg 3 times daily.  Reports enhanced effect with adding afternoon dose, mild daytime sedation effects are manageable. Recommended taking baclofen and buspirone at separate times.   Flexeril - 5-10 mg twice daily as needed. PCP managing. Recommend reducing use to once daily as needed if helpful on days with increased pain.   Allow 4-6 hours in between all muscle relaxant doses, do not mix with alcohol.   [place] on 1/3/24. No clear benefit for pain with daytime products, some benefit at night with products used at bedtime.       Potential procedures:   Bilateral SI joint injections recommended at last visit. Orders placed to schedule with Dr. Reynaga. Will having scheduling team   Bilateral cervical 3,4,5 medial branch nerve block #1 on 6/27/24 and MBB #2 on 10/24/24. Reports up to 80% pain relief for a few hours, pain returned to baseline  by the next day after procedures. Orders placed to schedule RFA.   Will discuss higher cervical pain with Dr. Reynaga, possibly target C2 medial branch?         Other Therapies/Orders/Referrals:   TENS unit - continue use throughout day as needed.      Follow up with JEANNIE Schroeder CNP around 3-4 weeks after SI joint injections       Review of Electronic Chart: Today I have also reviewed available medical information in the patient's medical record at [facility] ([facility]) and Care Everywhere (if available), including relevant provider notes, laboratory work, and imaging.     Jenny Landrum, NAOMI, JEANNIE, AGNP-C  [facility]     -------------------------------------------------    Subjective:    Chief complaint:   Chief Complaint   Patient presents with     Pain     RECHECK     Follow up- medication/pain       Interval history:  Radha Beckwith is a 48 year old female last seen on 10/10/24.      Recommendations/plan at the last visit included:  Pain Physical Therapy:  Prior PT for low back/SI and neck pain. Continues HEP.      Pain Psychology: Previously worked with Karen Kohler PhD, last visit on 9/30/24.      Diagnostic Studies:  EKG 10/4/24 - QT/QTc WNL      Medication Management:   Nortriptyline - start 10 mg at bedtime and titrate to 20 mg dose as tolerated. Monitor for pain benefit, cautioned about potential sedation effects. Prescriptions sent to pharmacy today for 10 mg capsules.   Diclofenac gel to both hips 3-4 x daily. Advised to use consistently and may take 1-2 weeks to notice benefit. If prescription not covered by insurance, may purchase over the counter.   Duloxetine -  Current dose 60 mg twice daily, increased from 90 mg/day to 120 mg/day at recent visit. Will discuss outcome with dose adjustment at future follow up.   Muscle relaxants:   Continue baclofen 20 mg 3 times daily.  Reports enhanced effect with adding afternoon dose, mild daytime sedation effects are  manageable. Recommended taking baclofen and buspirone at separate times.   Flexeril - 5-10 mg twice daily as needed. PCP managing. Recommend reducing use to once daily as needed if helpful on days with increased pain.   Allow 4-6 hours in between all muscle relaxant doses, do not mix with alcohol.   [place] 1[place]3/24. No clear benefit for pain with daytime products, some benefit at night with products used at bedtime.       Potential procedures:   Bilateral SI joint injections recommended today - positive bilateral KATHY, sacral thrust, and Yeoman's on repeat exam. Orders placed to schedule with Dr. Reynaga.   Bilateral cervical 3,4,5 medial branch nerve block #1 on 6/27/24. She is scheduled for MBB #2 on 10/24/24.        Other Therapies/Orders/Referrals:   TENS unit - continue use throughout day as needed.      Follow up with JEANNIE Schroeder CNP around 3-4 weeks after SI joint injections     Since her last visit, Radha Beckwith reports:  -She reports GT bursitis has not improved.   -Diclofenac gel recommended for GT bursitis, states she did not start using this on hips.   -She cannot tolerate PO NSAIDs due to GI upset.   -We discussed nortriptyline trial at last visit, states that she did not get started with this. She had vivid dreams and stopped use after 4 days. Dreams improved after she stopped use, states she continues to have PTSD related dreams but not as intense once off TCA.   -She had MBB #2 on 10/24/24. She reports up to 80% pain relief that lasted for a few hours. She reports similar results from MBB #1 in June, up to 80% relief for a few hours.   -We had discussed SI joint injections at last visit, but it does not appear she got scheduled yet. We will have schedulers contact her to set up appointment.     PEG: A Three-Item Scale Assessing Pain Intensity and Interference    What number best describes your PAIN ON AVERAGE in the  "past week? (Patient-Rptd) 7    What number best describes how, during the past week, pain has interfered with your ENJOYMENT OF LIFE? (Patient-Rptd) 10 - Completely interferes    What number best describes how, during the past week, pain has interfered with your GENERAL ACTIVITY? (Patient-Rptd) 10 - Completely interferes    PEG Total Score: (Patient-Rptd) 9    Fabian EE, Hayden KA, Karie MJ, Anjana TA, Henri J, Joyce JM, Ban SM, Brandie K. Development and initial validation of the PEG, a 3-item scale assessing pain intensity and interference. Journal of General Internal Medicine. 2009 [place];24:733-738.        HPI/Interval Hx from last visit:  -She had follow up with Dr. Dozier today, states this was very short and did not feel like she truly got what she was hoping/looking for.   -However, she did say that he did give her additional information.   -She was told this is a \"chronic pain\" now and was advised she can follow up with me for this.   -She reports epigastric pain around incisional site, pain worsens with bending forward. Also notes that she has tingling sensation and sensitivity to light tough (even from shirt).   -She notes that activity in general contributes to epigastric pain.   -She continues to have low back pain. She had mixed results with SI joint injections in the past.   -She is open to trial nortriptyline for abdominal pain.       Current Pain Treatments:                Suboxone 2-0.5 mg daily (weaning off)              Ropinirole 1 mg at bedtime               Cyclobenzaprine 5-10 mg QD PRN                                Baclofen 20 mg TID              Duloxetine 60 mg BID                 TENS              PT - HEP              CMBB/RFA - orders placed for RFA today               Bilateral SI joint injections ordered at [facility], not scheduled yet        Current MME: N/A     Review of [place] Prescription Monitoring Program (): No concern for abuse or misuse of controlled medications based on this " report. Reviewed - appears appropriate.      Past pain treatments:  SI joint injection 5/16/23 -significant benefit x1 month, then diminishing efficacy  KINDRA on 4/19/24 with Dr. Ivory - marginal benefit   Gabapentin   Nortriptyline 10-20 mg at bedtime     Medications:  Current Outpatient Medications   Medication Sig Dispense Refill     acetaminophen (TYLENOL) 500 MG tablet Take 1,000 mg by mouth every 6 hours as needed for mild pain       ALPRAZolam (XANAX) 0.5 MG tablet        baclofen (LIORESAL) 20 MG tablet Take 1 tablet (20 mg) by mouth 3 times daily 90 tablet 1     bisacodyl (DULCOLAX) 5 MG EC tablet Take 1 tablet (5 mg) by mouth every other day Take every other day. If no bowel movement for 2 or more days, take 2 tablets of bisacodyl (10 mg) 30 tablet 2     buprenorphine HCl-naloxone HCl (SUBOXONE) 2-0.5 MG per film Place 0.5 Film under the tongue daily        busPIRone HCl (BUSPAR) 30 MG tablet 30 mg 3 times daily       CONSTULOSE 10 GM/15ML solution TAKE 15 MLS (10 G) BY MOUTH 3 TIMES DAILY AS NEEDED FOR CONSTIPATION (AS NEEDED FOR SEVERE CONSTIPAITON, NO BM FOR MORE THAN 3 DAYS AND FEELING CONSTIPATED) 300 mL 3     cyclobenzaprine (FLEXERIL) 5 MG tablet TAKE ONE TO TWO TABLETS BY MOUTH THREE TIMES A DAY AS NEEDED FOR MUSCLE SPASMS 90 tablet 5     diclofenac (VOLTAREN) 1 % topical gel Apply 4 g topically 4 times daily. 350 g 2     DULoxetine (CYMBALTA) 60 MG capsule Take 1 capsule (60 mg) by mouth 2 times daily. 180 capsule 0     famotidine (PEPCID) 40 MG tablet Take 1 tablet (40 mg) by mouth 2 times daily 60 tablet 1     linaclotide (LINZESS) 72 MCG capsule Take 1 capsule (72 mcg) by mouth every morning (before breakfast). 30 capsule 11     medical cannabis (Patient's own supply) See Admin Instructions. [place]- Purchases through [facility].   (The purpose of this order is to document that the patient reports taking medical cannabis.  This is not a prescription, and is  not used to certify that the patient has a qualifying medical condition.)       ondansetron (ZOFRAN ODT) 4 MG ODT tab DISSOLVE ONE TABLET ON TONGUE EVERY 6 HOURS AS NEEDED FOR NAUSEA 20 tablet 1     pantoprazole (PROTONIX) 40 MG EC tablet TAKE ONE TABLET BY MOUTH TWICE A DAY WITH MEALS 60 tablet 5     rOPINIRole (REQUIP) 1 MG tablet TAKE ONE TABLET BY MOUTH EVERY NIGHT AT BEDTIME 90 tablet 0     simvastatin (ZOCOR) 10 MG tablet TAKE ONE TABLET BY MOUTH EVERY NIGHT AT BEDTIME 90 tablet 1     temazepam (RESTORIL) 15 MG capsule        nortriptyline (PAMELOR) 10 MG capsule Start 10 mg at bedtime x 1 week, then increase to 20 mg at bedtime. 60 capsule 1       Medical History: any changes in medical history since they were last seen? No      Objective:    Physical Exam:  not currently breastfeeding.  GENERAL: alert and no distress  EYES: Eyes grossly normal to inspection.  No discharge or erythema, or obvious scleral/conjunctival abnormalities.  RESP: No audible wheeze, cough, or visible cyanosis.    SKIN: Visible skin clear. No significant rash, abnormal pigmentation or lesions.  NEURO: Cranial nerves grossly intact.  Mentation and speech appropriate for age.  PSYCH: Appropriate affect, tone, and pace of words    Diagnostic Tests/Imaging/Labs:  CMP on 10/4/24 - GFR >90 and hepatic function WNL    BILLING TIME DOCUMENTATION:   The total TIME spent on this patient on the date of the encounter/appointment was 42 minutes.      TOTAL TIME includes:   Time spent preparing to see the patient (reviewing records and tests)   Time spent face to face (or over the phone) with the patient   Time spent ordering tests, medications, procedures and referrals   Time spent Referring and communicating with other healthcare professionals   Time spent documenting clinical information in Epic             Again, thank you for allowing me to participate in the care of your patient.      Sincerely,    JEANNIE Schroeder CNP

## 2024-10-28 NOTE — Clinical Note
Please reach out to patient to set up appointment for SI joint injections with Dr. Reynaga.   Thanks, Jenny

## 2024-10-29 NOTE — TELEPHONE ENCOUNTER
No PA required, okay to schedule        Georgiana GANN  Complex   [facility]

## 2024-10-29 NOTE — TELEPHONE ENCOUNTER
We will certainly address all of the areas of wrist pain at her next follow up, and would be happy to see sooner if we need to focus on these other areas of pain more prominently. We just wouldn't be able to repeat DQ injections until 11/22/24. Thank you.     Jose Adame DO, CAQSM  [facility]  [facility] - Department of Orthopedic Surgery

## 2024-10-29 NOTE — TELEPHONE ENCOUNTER
Small point of clarification: bilateral DQ injections were performed on 8/22/24, not on 6/6/24.     3 months between same-site injections. Did the cervical MBB help with the wrist pain at all? It looks like you are proceeding through that process, which is good. I would continue to work with your providers for the neck as it often contributes pain to the wrists. I would be happy to repeat those DQ injections if needed after it has been at least 3 months and the pain did not go away after any cervical procedures.     Thank you,   Jose Adame DO, CAQSM  [facility] Medicine  [facility] - Department of Orthopedic Surgery

## 2024-10-30 ENCOUNTER — TELEPHONE (OUTPATIENT)
Dept: PALLIATIVE MEDICINE | Facility: CLINIC | Age: 49
End: 2024-10-30
Payer: COMMERCIAL

## 2024-10-30 NOTE — TELEPHONE ENCOUNTER
Patient asking if SI joint injections can be done in [place].     Patient is scheduled for ablation in [place] on 1/3/2024.     Patient wondering about thoracic injection order from a while back.  Patient states that her pain is located across lower back at pants waistline area.  Patient educated that this could still be lumbar.   Patient wondering if LESI would be more beneficial for her than the SI joint injection that is currently awaiting scheduling.     Routing to provider to review and advise.     Reema Gaffney RN

## 2024-10-30 NOTE — TELEPHONE ENCOUNTER
Screening Questions for RFA Procedure      Procedure ordered? LRFA    What insurance are we billing for this procedure?  BC  IF SCHEDULING AT [place] [facility] PLEASE SCHEDULE AT LEAST 7-10 BUSINESS DAYS OUT SO A PA CAN BE OBTAINED    Is patient scheduled at [facility]? NO  If YES, route every encounter to [facility] SPINE CENTER CARE NAVIGATION POOL [[id number]]  Has patient had this injection before? No  Any chance of pregnancy? NO   If YES, do NOT schedule and route to RN pool     Is  Needed?: No  Will patient have a ?  Yes   If pt is given sedation meds, no driving for 24 hours.  Is pt taking a cab or transportation service? NO      If so will need to be accompanied by an adult too (friend/family member) in order for IV sedation to be given.    Per [place] Policy:  Outpatients are to have responsible adult or family member to accompany them at discharge and drive them home. A service providing medically trained drivers or attendants would be acceptable. Public transportation would not be acceptable unless the patient is accompanied by a responsible adult or family member.  Is patient taking any blood thinners (i.e. plavix, coumadin, jantoven, warfarin, heparin, pradaxa or dabigatran, etc)? No   If YES, do NOT schedule, and route to RN pool     Is patient taking any GLP-1 Antagonist (hold needed for sedation patients only) No   (semaglutide (Ozempic, Wegovy), dulaglutide (Trulicity), exenatide ER (Bydureon), tirzepatide (Mounjaro), Liraglutide (Saxenda, Victoza), semaglutide (Rybelsus),Terzepatide (Zepbound)  If YES, okay to schedule AND route to RN nurse / Dr. Wang's Team    Does the patient have a bleeding or clotting disorder? No   If YES, it it OKAY to schedule AND route to RN pool  **For any patients with platelet count <100, must be forwarded to provider**    Is patient diabetic? No If YES, have them bring their glucometer.    Does patient have an active infection or treated for  one within the past week? No   If YES, do NOT schedule and route to RN nurse pool     Is patient currently taking any antibiotics?  No  For patients on chronic, preventative, or prophylactic antibiotics, procedures may be scheduled.   For patients on antibiotics for active or recent infection:antibiotic course must have been completed for 4 days    Is patient currently taking any steroid medications? (i.e. Prednisone, Medrol)  No   For patients on steroid medications, course must have been completed for 4 days    Is patient actively being treated for cancer or immunocompromised, including the spleen having been removed? No  If YES, do NOT schedule and route to RN pool     Any history of complications with sedation medications?  no   If YES, OK to schedule AND route to RN pool     Any history of sleep apnea?  NO   If YES, OK to schedule AND route to RN pool     Any cardiac history?  NO   If YES, OK to schedule AND route to RN pool     Do you have an implanted pacemaker, ICD (implanted cardiac device) or AICD (automatic implanted cardiac device)?  NO  If YES, do NOT schedule AND route to RN pool (for all providers including Dr Waggoner)   Obtain name of device :     Obtain name of cardiologist:       Do you have an implanted stimulator?  NO  If YES, OK to schedule AND route to nursing.   Instruct patient to bring in the remote to the appointment and it will need to be turned off.  reviewed      Does patient have an allergy to contrast dye, iodine or shellfish?  No   If YES, OK to schedule. Route to RN pool AND add allergy information to appointment notes    Are you able to get on and off an exam table with minimal or no assistance? Yes   If NO, do NOT schedule and route to RN pool    Are you able to roll over and lay on your stomach with minimal or no assistance? Yes   If NO, do NOT schedule and route to RN pool    Reminders:  If you are started on any steroids or antibiotics between now and your appointment,  you must contact us because it may affect our ability to perform your procedure.  Yes  Informed patient that s/he needs to fast for 6 hours before procedure?  YES  Informed patient that it is OK to take normal medications with sips of water, especially blood pressure medications, before the procedure and must hold blood thinners as instructed.  Yes  Informed patient to arrive 30 minutes before procedure time to have an IV inserted.  reviewed   Do NOT schedule at 0745, 0815 or 1245.  reviewed   All radiofrequency ablations are in a 60 minute time slot.If cervical RFA, please schedule each side separate. If okay to do bilateral cervical RFA, schedule for 90 minutes.  reviewed

## 2024-10-30 NOTE — TELEPHONE ENCOUNTER
Patient Returning Call    Reason for call:  patient wanting to get an injection order placed and wanting to know if she can get it done in [facility] and she was also suppose to get a thoracic injection, requesting callback     Information relayed to patient:  te sent to clinic     Patient has additional questions:  No      Could we send this information to you in [employer] or would you prefer to receive a phone call?:   Patient would prefer a phone call   Okay to leave a detailed message?: Yes at Cell number on file:    Telephone Information:   Mobile [phone]

## 2024-10-31 ENCOUNTER — TELEPHONE (OUTPATIENT)
Dept: ANESTHESIOLOGY | Facility: CLINIC | Age: 49
End: 2024-10-31
Payer: COMMERCIAL

## 2024-10-31 NOTE — TELEPHONE ENCOUNTER
Left Voicemail (1st Attempt) and Sent Mychart (1st Attempt) for the patient to call back and schedule the following:    Appointment type: Return Pain  Provider: Jenny Landrum  Visit mode: In Person  Return date: Approx. 2-weeks post-procedure (~12/2 - procedure not yet scheduled)  Specialty phone number: [phone]  Location: [facility]

## 2024-10-31 NOTE — TELEPHONE ENCOUNTER
Still waiting on Jenny Landrum DNP, APRN, AGNP-C to review.    Pt will be notified after that.      Damaris RN-BSN  [facility]   [phone]

## 2024-10-31 NOTE — TELEPHONE ENCOUNTER
[facility] Call Center    Phone Message    May a detailed message be left on voicemail: yes     Reason for Call: Other: Pt is calling and would like to talk with Reema about the injection orders. Please call Pt back to discuss.      Action Taken: Message routed to:  Other: [facility]    Travel Screening: Not Applicable     Date of Service:

## 2024-11-02 NOTE — TELEPHONE ENCOUNTER
Orders for SI joint injection placed on 10/10/24 to schedule with Dr. Ryenaga. The SI joint injection was discussed at recent visits, and this recommendation was based on reported symptoms/prior exam. If she has additional questions about procedure recommendations, she should schedule a follow up to further discuss.     Jenny Landrum, NAOMI, APRN, AGNP-C  [facility]

## 2024-11-03 NOTE — PATIENT INSTRUCTIONS
"It was a pleasure taking care of you today.  I've included a brief summary of our discussion and care plan from today's visit below.  Please review this information with your primary care provider.  ______________________________________________________________________    My recommendations are summarized as follows:    Continue pantoprazole 40 mg before breakfast.    2.  Take famotidine before bed as needed.    3.  I am referring you to trigger point injections for your abdominal pain.  Please also see your pain management provider  if pain persists.    4.  Take MiraLAX every day for constipation. Continued linaclotide as prescribed.    Return to GI Clinic in 3 months to review your progress.    ______________________________________________________________________  What is a hiatal hernia?  A hiatal hernia is when a part of the stomach moves up into the chest area. Normally, the stomach sits below the diaphragm. The diaphragm is the layer of muscle that separates the organs in the chest from the organs in the belly. The esophagus, the tube that carries food from the mouth to the stomach, passes through a hole in the diaphragm. In people with a hiatal hernia, the stomach pushes up through that hole, too.  There are 2 types of hiatal hernia:   ?Sliding hernia - This is when the top of the stomach and the lower part of the esophagus squeeze up into the space above the diaphragm. This is the most common type of hiatal hernia.  ?Paraesophageal hernia - This is when the top of the stomach squeezes up into the space above the diaphragm. This is not very common, but it can be serious if the stomach folds up on itself. It can also cause bleeding from the stomach or trouble breathing.    What are the symptoms of a hiatal hernia?  A hiatal hernia does not usually cause symptoms. In some cases, though, it can cause stomach acid to leak into the esophagus. This is called \"acid reflux\" or \"gastroesophageal reflux.\" It can cause " symptoms like:  ?Burning in the chest, known as heartburn  ?Burning in the throat or an acid taste in the throat  ?Stomach or chest pain  ?Trouble swallowing  ?A raspy voice or a sore throat  ?Unexplained cough    How are hiatal hernias treated?  People who have symptoms caused by a hiatal hernia can get treatment for their symptoms.  Treatment for symptoms involves taking the medicines that are used for acid reflux. People with a paraesophageal hernia, and some people with a sliding hernia, need surgery. For this surgery, the surgeon pulls the stomach back down and repairs the hole in the diaphragm. This way, the stomach does not slide up again.      ____________________________________________________________________        Who do I call with any questions after my visit?  Please be in touch if there are any further questions that arise following today's visit.  There are multiple ways to contact your gastroenterology care team.      During business hours, you may reach a Gastroenterology nurse at [phone], option 3.     To schedule or reschedule an appointment, please call [phone].   To schedule your imaging studies (CT, MRI, ultrasound)  call [phone] (or toll-free # [phone])  To schedule your lab work at [facility], please call [phone]    You can always send a secure message through [employer].  [employer] messages are answered by your nurse or doctor typically within 24 hours.  Please allow extra time on weekends and holidays.      For urgent/emergent questions after business hours, you may reach the on-call GI Fellow by contacting the [facility]  at [phone].    In order for your refill to be processed in a timely fashion, it is your responsibility to ensure you follow the recommendations from your provider regarding your laboratory studies and follow up appointments.       How will I get the results of any tests ordered?    You will receive  all of your results.  If you have signed up for [employer], any tests ordered at your visit will be available to you after your physician reviews them.  Typically this takes 1-2 weeks.  If there are urgent results that require a change in your care plan, your physician or nurse will call you to discuss the next steps.   What is [employer]?  Last[employer]fm is a secure way for you to access all of your healthcare records from the [facility].  It is a web based computer program, so you can sign on to it from any location.  It also allows you to send secure messages to your care team.  I recommend signing up for [employer] access if you have not already done so and are comfortable with using a computer.    How to I schedule a follow-up visit?  If you did not schedule a follow-up visit today, please call [phone] to schedule a follow-up office visit.      Sincerely,  CHAPIN Ponce,  [facility],  Division of Gastroenterology   ([facility])

## 2024-11-04 ENCOUNTER — VIRTUAL VISIT (OUTPATIENT)
Dept: PSYCHOLOGY | Facility: CLINIC | Age: 49
End: 2024-11-04
Payer: COMMERCIAL

## 2024-11-04 ENCOUNTER — OFFICE VISIT (OUTPATIENT)
Dept: GASTROENTEROLOGY | Facility: CLINIC | Age: 49
End: 2024-11-04
Payer: COMMERCIAL

## 2024-11-04 ENCOUNTER — TELEPHONE (OUTPATIENT)
Dept: PALLIATIVE MEDICINE | Facility: CLINIC | Age: 49
End: 2024-11-04

## 2024-11-04 VITALS
BODY MASS INDEX: 27.83 KG/M2 | DIASTOLIC BLOOD PRESSURE: 88 MMHG | WEIGHT: 163 LBS | HEART RATE: 96 BPM | SYSTOLIC BLOOD PRESSURE: 136 MMHG | HEIGHT: 64 IN

## 2024-11-04 DIAGNOSIS — F11.21 OPIOID DEPENDENCE IN REMISSION (H): Primary | ICD-10-CM

## 2024-11-04 DIAGNOSIS — R19.00 ABDOMINAL MASS, UNSPECIFIED ABDOMINAL LOCATION: ICD-10-CM

## 2024-11-04 DIAGNOSIS — K44.9 HIATAL HERNIA: ICD-10-CM

## 2024-11-04 DIAGNOSIS — F33.1 MODERATE EPISODE OF RECURRENT MAJOR DEPRESSIVE DISORDER (H): Primary | ICD-10-CM

## 2024-11-04 DIAGNOSIS — K59.01 SLOW TRANSIT CONSTIPATION: ICD-10-CM

## 2024-11-04 DIAGNOSIS — K21.00 GASTROESOPHAGEAL REFLUX DISEASE WITH ESOPHAGITIS WITHOUT HEMORRHAGE: Primary | ICD-10-CM

## 2024-11-04 DIAGNOSIS — R10.13 ABDOMINAL WALL PAIN IN EPIGASTRIC REGION: ICD-10-CM

## 2024-11-04 DIAGNOSIS — F41.1 GENERALIZED ANXIETY DISORDER: ICD-10-CM

## 2024-11-04 DIAGNOSIS — R14.0 ABDOMINAL BLOATING: ICD-10-CM

## 2024-11-04 DIAGNOSIS — G89.4 CHRONIC PAIN SYNDROME: ICD-10-CM

## 2024-11-04 DIAGNOSIS — R11.0 NAUSEA: ICD-10-CM

## 2024-11-04 DIAGNOSIS — F41.9 ANXIETY: ICD-10-CM

## 2024-11-04 PROCEDURE — 99214 OFFICE O/P EST MOD 30 MIN: CPT | Performed by: NURSE PRACTITIONER

## 2024-11-04 PROCEDURE — 90837 PSYTX W PT 60 MINUTES: CPT | Mod: 95 | Performed by: COUNSELOR

## 2024-11-04 RX ORDER — ONDANSETRON 4 MG/1
TABLET, ORALLY DISINTEGRATING ORAL
Qty: 20 TABLET | Refills: 1 | Status: SHIPPED | OUTPATIENT
Start: 2024-11-04

## 2024-11-04 ASSESSMENT — PAIN SCALES - GENERAL: PAINLEVEL_OUTOF10: SEVERE PAIN (6)

## 2024-11-04 NOTE — PROGRESS NOTES
Orders placed per request of provider for trigger point injections of abdomen    Thank you,  Kinga MASSEY,BSN RN  [facility]  Gastroenterology

## 2024-11-04 NOTE — PROGRESS NOTES
Answers submitted by the patient for this visit:  Patient Health Questionnaire (Submitted on 11/4/2024)  If you checked off any problems, how difficult have these problems made it for you to do your work, take care of things at home, or get along with other people?: Extremely difficult  PHQ9 TOTAL SCORE: 22        [facility]                                     Progress Note    Patient Name: Radha Beckwith  Date: 11/4/24         Service Type: Individual      Session Start Time: 3:30pm Session End Time:  4:27pm     Session Length:  57    Session #: 58    Attendees: Client    Service Modality:  video      Provider verified identity through the following two step process.  Patient provided:  Patient is known previously to provider    Telemedicine Visit: The patient's condition can be safely assessed and treated via synchronous audio and visual telemedicine encounter.      Reason for Telemedicine Visit: Patient convenience (e.g. access to timely appointments / distance to available provider)    Originating Site (Patient Location): Patient's home    Distant Site (Provider Location): Provider Remote Setting- Home Office    Consent:  The patient/guardian has verbally consented to: the potential risks and benefits of telemedicine (video visit) versus in person care; bill my insurance or make self-payment for services provided; and responsibility for payment of non-covered services.     Patient would like the video invitation sent by:  My Chart    Mode of Communication:  video    Distant Location (Provider):  Off-site home office    As the provider I attest to compliance with applicable laws and regulations related to telemedicine.    DATA  Interactive Complexity: No  Crisis: No        Progress Since Last Session (Related to Symptoms / Goals / Homework):   Symptoms: No change .    Homework: Completed in session      Episode of Care Goals: Satisfactory progress - MAINTENANCE (Working to maintain change,  with risk of relapse); Intervened by continuing to positively reinforce healthy behavior choice      Current / Ongoing Stressors and Concerns:   Client stated she had her GI appointment today for her Bravo study. The doctor wants her to check in with her pain management team to get shots in her hernia area.   Has other shots coming up this month and an oblation in January.        Treatment Objective(s) Addressed in This Session:   Increase interest, engagement, and pleasure in doing things  Feel less tired and more energy during the day        Intervention:   Validated client's frustrations about her medical procedures and her medical burn out.  Talked through some other treatment options she's looking into Reviewed some questions she can ask her doctors regarding some of the things they brought up to her that she didn't understand.     Assessments completed prior to visit:  The following assessments were completed by patient for this visit:  PHQ9:       9/16/2024     3:26 PM 9/23/2024    11:46 AM 10/3/2024     2:00 PM 10/7/2024    10:50 AM 10/14/2024     3:26 PM 10/27/2024     7:57 PM 11/4/2024    12:53 PM   PHQ-9 SCORE   PHQ-9 Total Score MyChart 22 (Severe depression) 25 (Severe depression) 22 (Severe depression) 24 (Severe depression) 23 (Severe depression) 23 (Severe depression) 22 (Severe depression)   PHQ-9 Total Score 22 25 22 24 23 23  22        Patient-reported     GAD7:       7/1/2024     2:51 PM 7/25/2024    11:52 AM 8/13/2024     5:35 PM 8/28/2024     2:06 PM 9/16/2024     3:27 PM 10/3/2024     1:58 PM 10/27/2024     7:59 PM   BO-7 SCORE   Total Score 19 (severe anxiety) 21 (severe anxiety) 19 (severe anxiety) 21 (severe anxiety) 20 (severe anxiety) 20 (severe anxiety) 20 (severe anxiety)   Total Score 19 21 19    19 21 20    20 20    20 20        Patient-reported    Multiple values from one day are sorted in reverse-chronological order     PROMIS 10-Global Health (all questions and answers displayed):        4/10/2024     1:58 PM 4/17/2024     1:28 PM 7/25/2024    11:56 AM 8/4/2024     7:20 PM 8/10/2024     2:51 PM 8/18/2024    11:36 AM 8/28/2024     2:07 PM   PROMIS 10   In general, would you say your health is: Poor Poor Poor Poor Fair Poor Poor   In general, would you say your quality of life is: Poor Poor Fair Poor Poor Fair Fair   In general, how would you rate your physical health? Poor Poor Poor Poor Poor Poor Fair   In general, how would you rate your mental health, including your mood and your ability to think? Poor Poor Poor Poor Poor Poor Poor   In general, how would you rate your satisfaction with your social activities and relationships? Poor Poor Poor Fair Poor Fair Fair   In general, please rate how well you carry out your usual social activities and roles Fair Poor Poor Poor Poor Fair Fair   To what extent are you able to carry out your everyday physical activities such as walking, climbing stairs, carrying groceries, or moving a chair? A little A little A little Moderately A little A little A little   In the past 7 days, how often have you been bothered by emotional problems such as feeling anxious, depressed, or irritable? Always Always Always Always Always Always Always   In the past 7 days, how would you rate your fatigue on average? Severe Very severe Severe Severe Very severe Very severe Severe   In the past 7 days, how would you rate your pain on average, where 0 means no pain, and 10 means worst imaginable pain? 7 7 7 7 7 7 7   In general, would you say your health is: 1  1  1  1  2  1  1    In general, would you say your quality of life is: 1  1  2  1  1  2  2    In general, how would you rate your physical health? 1  1  1  1  1  1  2    In general, how would you rate your mental health, including your mood and your ability to think? 1  1  1  1  1  1  1    In general, how would you rate your satisfaction with your social activities and relationships? 1  1  1  2  1  2  2    In general,  please rate how well you carry out your usual social activities and roles. (This includes activities at home, at work and in your community, and responsibilities as a parent, child, spouse, employee, friend, etc.) 2  1  1  1  1  2  2    To what extent are you able to carry out your everyday physical activities such as walking, climbing stairs, carrying groceries, or moving a chair? 2  2  2  3  2  2  2    In the past 7 days, how often have you been bothered by emotional problems such as feeling anxious, depressed, or irritable? 5  5  5  5  5  5  5    In the past 7 days, how would you rate your fatigue on average? 4  5  4  4  5  5  4    In the past 7 days, how would you rate your pain on average, where 0 means no pain, and 10 means worst imaginable pain? 7  7  7  7  7  7  7    Global Mental Health Score 4    4 4    4 5    5 5 4    4 6 6   Global Physical Health Score 7    7 6    6 7    7 8 6    6 6 8   PROMIS TOTAL - SUBSCORES 11    11 10    10 12    12 13 10    10 12 14       Patient-reported    Multiple values from one day are sorted in reverse-chronological order     [place] Suicide Severity Rating Scale (Lifetime/Recent)      5/13/2024     3:13 PM 8/21/2024    10:30 AM 8/30/2024     8:20 AM 8/31/2024    12:07 PM 9/5/2024     5:33 PM 9/13/2024    10:52 AM 10/4/2024     4:54 PM   [place] Suicide Severity Rating (Lifetime/Recent)   Q1 Wished to be Dead (Past Month) 0-->no 0-->no 0-->no 0-->no 0-->no 0-->no 0-->no   Q2 Suicidal Thoughts (Past Month) 0-->no 0-->no 0-->no 0-->no 0-->no 0-->no 0-->no   Q6 Suicide Behavior (Lifetime) 0-->no 0-->no 0-->no 0-->no 1-->yes 0-->no 0-->no   If yes to Q6, within past 3 months?     0-->no     Level of Risk per Screen no risks indicated no risks indicated no risks indicated no risks indicated moderate risk no risks indicated no risks indicated         ASSESSMENT: Current Emotional / Mental Status (status of significant symptoms):   Risk status (Self / Other harm or suicidal  ideation)   Patient denies current fears or concerns for personal safety.   Patient denies current or recent suicidal ideation or behaviors.   Patient denies current or recent homicidal ideation or behaviors.   Patient denies current or recent self injurious behavior or ideation.   Patient denies other safety concerns.   Patient reports there has been no change in risk factors since their last session.     Patient reports there has been no change in protective factors since their last session.     Recommended that patient call 911 or go to the local ED should there be a change in any of these risk factors.     Appearance:   appropriate    Eye Contact:   good    Psychomotor Behavior: Normal    Attitude:   Cooperative    Orientation:   All   Speech    Rate / Production: Normal/ Responsive Normal     Volume:  Normal    Mood:    Normal   Affect:    Appropriate    Thought Content:  Clear    Thought Form:  Coherent    Insight:    Good      Medication Review:   No changes to current psychiatric medication(s)     Medication Compliance:   Yes     Changes in Health Issues:   None reported     Chemical Use Review:   Substance Use: Chemical use reviewed, no active concerns identified      Tobacco Use: No change in amount of tobacco use since last session.  Patient declined discussion at this time    Diagnosis:  1. Moderate episode of recurrent major depressive disorder (H)    2. Generalized anxiety disorder            Collateral Reports Completed:   Not Applicable    PLAN: (Patient Tasks / Therapist Tasks / Other)  Continue to utilize stress reduction skills daily.   Follow up with providers regarding questions.         Ricarda Bhatia [facility]                                                         ______________________________________________________________________    Individual Treatment Plan    Patient's Name: Radha Beckwith  YOB: 1975    Date of Creation: 6/28/23  Date Treatment Plan Last  Reviewed/Revised: 10/14/24    DSM5 Diagnoses: 296.32 (F33.1) Major Depressive Disorder, Recurrent Episode, Moderate _  Psychosocial / Contextual Factors: living with boyfriend, memory issues  PROMIS (reviewed every 90 days):     Referral / Collaboration:  Referral to another professional/service is not indicated at this time..    Anticipated number of session for this episode of care: 9-12 sessions  Anticipation frequency of session:  as needed  Anticipated Duration of each session: 38-52 minutes  Treatment plan will be reviewed in 90 days or when goals have been changed.       MeasurableTreatment Goal(s) related to diagnosis / functional impairment(s)  Goal 1: Patient will increase communication skills with family members.    Objective #A (Patient Action)    Patient will learn & utilize at least 2 assertive communication skills weekly.  Status: Continued - Date(s): 10/14/24    Intervention(s)  Therapist will teach emotional regulation skills. distress tolerance skills, interpersonal effectiveness skills, emotion regluation skills, mindfulness skills, radical acceptance. Therapist will teach client how to ID body cues for anxiety, anxiety reduction techniques, how to ID triggers for depression and anxiety- decrease reactivity/eliminate, lifestyle changes to reduce depression and anxiety, communication skills, explore cognitive beliefs and help client to develop healthy cognitive patterns and beliefs.    Objective #B  Patient will use thought-stopping strategy daily to reduce intrusive thoughts.  Status: Continued - Date(s): 10/14/24    Intervention(s)  Therapist will teach emotional regulation skills. distress tolerance skills, interpersonal effectiveness skills, emotion regluation skills, mindfulness skills, radical acceptance. Therapist will teach client how to ID body cues for anxiety, anxiety reduction techniques, how to ID triggers for depression and anxiety- decrease reactivity/eliminate, lifestyle changes to  reduce depression and anxiety, communication skills, explore cognitive beliefs and help client to develop healthy cognitive patterns and beliefs.      Goal 2: Patient will decrease depression symptoms.      Objective #A (Patient Action)    Status: Continued - Date(s): 10/14/24    Patient will Increase interest, engagement, and pleasure in doing things  Decrease frequency and intensity of feeling down, depressed, hopeless  Improve quantity and quality of night time sleep / decrease daytime naps  Feel less tired and more energy during the day   Improve diet, appetite, mindful eating, and / or meal planning  Identify negative self-talk and behaviors: challenge core beliefs, myths, and actions  Improve concentration, focus, and mindfulness in daily activities   Feel less fidgety, restless or slow in daily activities / interpersonal interactions.    Intervention(s)  Therapist will teach emotional regulation skills. distress tolerance skills, interpersonal effectiveness skills, emotion regluation skills, mindfulness skills, radical acceptance. Therapist will teach client how to ID body cues for anxiety, anxiety reduction techniques, how to ID triggers for depression and anxiety- decrease reactivity/eliminate, lifestyle changes to reduce depression and anxiety, communication skills, explore cognitive beliefs and help client to develop healthy cognitive patterns and beliefs.    Objective #B  Patient will identify three distraction and diversion activities and use those activities to decrease level of anxiety  .    Status: Continued - Date(s):  10/14/24    Intervention(s)  Therapist will teach emotional regulation skills. distress tolerance skills, interpersonal effectiveness skills, emotion regluation skills, mindfulness skills, radical acceptance. Therapist will teach client how to ID body cues for anxiety, anxiety reduction techniques, how to ID triggers for depression and anxiety- decrease reactivity/eliminate, lifestyle  changes to reduce depression and anxiety, communication skills, explore cognitive beliefs and help client to develop healthy cognitive patterns and beliefs.      Patient has reviewed and agreed to the above plan.      Ricarda Bhatia [facility]

## 2024-11-04 NOTE — TELEPHONE ENCOUNTER
Patient confirmed scheduled appointment:     Date: 1/16/25  Time: 1 PM  Visit type: Return Pain  Visit mode: In Person  Provider: Jenny Landrum  Location: [facility]    Additional Notes: Jona

## 2024-11-04 NOTE — TELEPHONE ENCOUNTER
Left detailed message with providers response and note that patient may schedule injection and/or follow-up with Jenny by contacting clinic.       Reema Gaffney RN

## 2024-11-04 NOTE — LETTER
"11/4/2024      Radha Beckwith  [address]  [facility] [address]      Dear Colleague,    Thank you for referring your patient, Radha Beckwith, to the [facility]. Please see a copy of my visit note below.      [facility]  [address] [place] [id number]  Phone: [phone]     Patient:  Radha Beckwith, Date of birth 1975     Referring Provider: Gisselle Linn         Gastroenterology CLINIC VISIT, RETURN PATIENT     REASON FOR CONSULTATION: follow up     HPI: 48 year old female presented to GI clinic for a follow up. The patient was seen for acid reflux and drug induced constipation. She was complaining of poor control of acid reflux despite taking maximal dose of pantoprazole. Was taking famotidine as needed.  A trial of sucralfate was prescribed but the patient stopped the medication as she believes it was making her abdominal pain worse. Patient is on baclofen twice a day (that patient takes for back pain). Last EGD in Jan.2023, was suggestive for diffuse mild erythema of the stomach.  Pathology report confirmed mild inactive chronic gastritis.  H. pylori was negative. Due to persistent symptoms, I ordered EGD with BRAVO. Patient would like to review the study findings. Stated that her symptoms are relatively well managed at this point. Experiences acid reflux occasionally. Said that some of her pills \"don't go down easy\". Denies chocking on pills or foods, but said that swallowing is uncomfortable at times.     Patient's main complaint today is ongoing upper abdominal pain since her surgery. She had a hernia repair with mesh placement on 8/30/2024. Has been palpating painful nodule at one of the incisions . Cannot wear tight close or bra due to pain. Noted that the patient recently had  two CT scans of abdomen. There was moderate amount of stool in the colon, and possible necrosis of subcutaneous fat of the anterior abdomen. "  A follow up CT showed decreased size of the well-defined fatty density nodule at the hernia repair site measuring 1.6 cm. MRI of pelvis was ordered by gynecology and came back unremarkable.         PREVIOUS ENDOSCOPY:  8/27/2024 BRAVO  This was an inconclusive study OFF therapy.  The overall % acid exposure of 3.9% was not elevated. The DeMeester was 13.6 overall which is not elevated.  There were 32 occurrences of heartburn, 13 of cough, 12 of regurgitation symptoms. SI was 21.9 (7/32), 23.1 (3/13), 33.3 (4/12) respectively overall for acid reflux which does not suggest a correlation. The SAP was 99.9, 95.2, 99.9 respectively which suggests a correlation. Interpret within clinical context.     Day 1  0.8% acid exposure, DeMeester Score 2.8   Day 2  4.5% acid exposure, DeMeester Score 17.1   Day 3  4.4% acid exposure, DeMeester Score 14.0   Day 4  6.2% acid exposure, DeMeester Score 17.9       8/21/2024  Findings:       Esophagogastric landmarks were identified: the Z-line was found at 37        cm, the gastroesophageal junction was found at 37 cm and the site of the        diaphragmatic hiatus was found at 40 cm from the incisors.        No endoscopic abnormality was evident in the esophagus to explain the        patient's complaint of dysphagia. It was decided, however, to proceed        with dilation of the entire esophagus. A TTS dilator was passed through        the scope. Dilation with 14-19mm dilator was performed to 19 mm. The        dilation site was examined and showed no change. Estimated blood loss:        none.        The gastroesophageal flap valve was visualized endoscopically and        classified as Hill Grade IV (no fold, wide open lumen, hiatal hernia        present).        A 3 cm hiatal hernia was present.        The BRAVO capsule with delivery system was introduced through the mouth        and advanced into the esophagus, such that the BRAVO pH capsule was        positioned 31 cm from the  incisors, which was 6 cm proximal to the GE        junction. The BRAVO pH capsule was then deployed and attached to the        esophageal mucosa. The delivery system was then withdrawn. Endoscopy was        utilized for probe placement and diagnostic evaluation. The scope was        reinserted to evaluate placement of the BRAVO capsule. Visualization        showed the BRAVO capsule to be in an appropriate position.        The entire examined stomach was normal.        The cardia and gastric fundus were normal on retroflexion.        A few localized erosions without bleeding were found in the duodenal        bulb.        The exam of the duodenum was otherwise normal.         10/3/2023 EGD  Findings:       The Z-line was regular and was found 37 cm from the incisors.        No endoscopic abnormality was evident in the esophagus to explain the        patient's complaint of dysphagia. Biopsies were taken proximal and        distal with a cold forceps for histology.        The stomach was normal.        The examined duodenum was normal.   Final Diagnosis   Esophagus, proximal and distal, biopsies:  --No significant histopathologic change.            PERTINENT IMAGING STUDIES WERE REVIEWED IN EMR    10/4/2024 CT abdomen/ pelvis  FINDINGS:   LOWER CHEST: Normal.   HEPATOBILIARY: Cholecystectomy.   PANCREAS: Normal.   SPLEEN: Calcified granuloma.   ADRENAL GLANDS: Normal.   KIDNEYS/BLADDER: Normal.   BOWEL: Previous upper midline ventral abdominal hernia repair with continued decrease in size of well-defined fatty nodule at the repair site probably fatty necrosis. Minimal amount of soft tissue surrounding infiltration.   LYMPH NODES: Normal.   VASCULATURE: Normal.   PELVIC ORGANS: Hysterectomy.   MUSCULOSKELETAL: Normal.                                                                  IMPRESSION:   1.  Previous upper midline ventral abdominal hernia repair. Decreased size of the well-defined fatty density nodule at the  hernia repair site now measuring 1.6 cm. This is probably evolving fat necrosis. There is a small amount of soft tissue   infiltration. No residual hernia.   2.  Cholecystectomy and hysterectomy.      9/13/2024 CT scan of abdomen/pelvis  FINDINGS:   LOWER CHEST: Subsegmental atelectasis of the lung bases.     HEPATOBILIARY: Cholecystectomy. No biliary ductal dilatation. No focal  liver lesion.     PANCREAS: No significant mass, duct dilatation, or inflammatory  change.     SPLEEN: Normal size. Few calcified splenic granulomas.     ADRENAL GLANDS: No significant nodules.     KIDNEYS/BLADDER: No significant mass, stones, or hydronephrosis.     BOWEL: No evidence of bowel obstruction. Moderate colonic stool  burden. Normal caliber appendix. Duodenal diverticulum. There is mild  diffuse wall thickening and hyperenhancement of the distal ileum  including the terminal ileum.     Postsurgical changes of ventral hernia repair without evidence  recurrence. Small amount of fluid subjacent to the ventral hernia  repair is mildly increased from prior and is favored postsurgical in  etiology. Mild inflammatory stranding of the anterior upper abdomen is  slightly decreased. No intra-abdominal abscess.     PELVIC ORGANS: Hysterectomy. No pelvic mass.     ADDITIONAL FINDINGS: No adenopathy in the abdomen or pelvis. Mild  burden of atherosclerotic disease without abdominal aortic aneurysm.  No pneumoperitoneum.     MUSCULOSKELETAL: Slightly decreased size of a fluid and fat collection  of the subcutaneous upper anterior abdominal wall which now measures  1.6 x 2.0 cm compared to 1.9 x 2.5 cm, likely reflecting evolving fat  necrosis.                                                                      IMPRESSION:   1.  Postsurgical changes ventral hernia repair without evidence for  recurrence. Evolving postsurgical inflammation of the upper abdomen.  There is a region of evolving fat necrosis in the subcutaneous fat of  the  anterior abdominal wall which could correspond to patient's  palpable abnormality.  2.  Mild inflammatory changes of the distal ileum including the  terminal ileum which may represent a nonspecific ileitis.         5/13/24 Chest x-ray  IMPRESSION: No acute cardiopulmonary abnormality.      Stable appearance of 3 mm nodular density along the right mid lung.  Findings may reflect a small pulmonary nodule or calcified granuloma.  If further characterization is needed, consider nonemergent follow-up  CT chest exam.        ROS: 10pt ROS performed and otherwise negative.     PAST MEDICAL HISTORY:  Past Medical History           Past Medical History:   Diagnosis Date     Abdominal pain, right lower quadrant 03/09/2008     Admit. Discharged 03/10/08     Anxiety attack 07/31/2015     Atypical chest pain 06/23/2015     De Quervain's disease (tenosynovitis)       Dehydration       Depressive disorder 1996     Gastric ulcer 07/31/2015     GERD (gastroesophageal reflux disease) 07/28/2010     Takes omeprazole and metoclopramide      Hypertension 2017     Ingrowing nail 01/09/2014     Migraines       Opioid dependence in remission (H) 08/02/2015     Other and unspecified ovarian cyst       Papanicolaou smear of cervix with low grade squamous intraepithelial lesion (LGSIL) 07/07/2017            PREVIOUS ABDOMINAL/GYNECOLOGIC SURGERIES:  Past Surgical History             Past Surgical History:   Procedure Laterality Date     BIOPSY CERVICAL, LOCAL EXCISION, SINGLE/MULTIPLE N/A 8/10/2017     Procedure: BIOPSY CERVICAL, LOCAL EXCISION, SINGLE/MULTIPLE;;  Surgeon: Michael Chandler MD;  Location:  OR     COLONOSCOPY N/A 1/6/2023     Procedure: COLONOSCOPY;  Surgeon: Michael Dozier MD;  Location:  GI     COLPOSCOPY, BIOPSY, COMBINED N/A 8/10/2017     Procedure: COMBINED COLPOSCOPY, BIOPSY;  Colposcopy with Cervical Biopsies and Endometrial Biopsy, Exam with Ultrasound;  Surgeon: Michael Chandler MD;  Location:  [facility] OR     ESOPHAGOSCOPY, GASTROSCOPY, DUODENOSCOPY (EGD), COMBINED N/A 4/17/2017     Procedure: COMBINED ESOPHAGOSCOPY, GASTROSCOPY, DUODENOSCOPY (EGD);  Surgeon: Ibrahima Esposito MD;  Location: [facility] GI     ESOPHAGOSCOPY, GASTROSCOPY, DUODENOSCOPY (EGD), COMBINED N/A 1/6/2023     Procedure: ESOPHAGOGASTRODUODENOSCOPY, WITH BIOPSY;  Surgeon: Michael Dozier MD;  Location: [facility]     ESOPHAGOSCOPY, GASTROSCOPY, DUODENOSCOPY (EGD), COMBINED N/A 10/3/2023     Procedure: ESOPHAGOGASTRODUODENOSCOPY, WITH BIOPSY;  Surgeon: John Paul Varela MD;  Location: [facility] GI     EXAM UNDER ANESTHESIA PELVIC N/A 8/10/2017     Procedure: EXAM UNDER ANESTHESIA PELVIC;;  Surgeon: Michael Chandler MD;  Location: [facility] OR     HERNIORRHAPHY, INCISIONAL, ROBOT-ASSISTED, LAPAROSCOPIC, USING DA JERRELL XI N/A 8/30/2024     Procedure: HERNIORRHAPHY, INCISIONAL, ROBOT-ASSISTED, LAPAROSCOPIC, USING DA JERRELL XI;  Surgeon: Michael Dozier MD;  Location: [facility]     HYSTERECTOMY         HYSTERECTOMY, PAP NO LONGER INDICATED         INJECT EPIDURAL CERVICAL N/A 10/20/2023     Procedure: Cervical 6-7 Interlaminar epidural steroid injection using fluoroscopic guidance with contrast dye.;  Surgeon: Trevor Ivory MD;  Location: [facility]     INJECT EPIDURAL CERVICAL N/A 4/19/2024     Procedure: Cervical 6 - Cervical 7 Interlaminar epidural steroid injection using fluoroscopic guidance with contrast dye.;  Surgeon: Trevor Ivory MD;  [facility]: [facility]     INJECT EPIDURAL LUMBAR Bilateral 7/1/2022     Procedure: Lumbar 5-Sacral 1 Transforaminal Epidural Steroid Injection with fluoroscopic guidance and contrast, bilateral;  Surgeon: Trevor Ivory MD;  [facility]: [facility]     LAPAROSCOPIC CHOLECYSTECTOMY N/A 2/2/2018     Procedure: LAPAROSCOPIC CHOLECYSTECTOMY;  Laparoscopic Cholecystectomy;  Surgeon: Tigre Lowry DO;  Location: [facility]     LAPAROSCOPIC HYSTERECTOMY TOTAL N/A 10/30/2017     Procedure: LAPAROSCOPIC HYSTERECTOMY TOTAL;  LAPAROSCOPIC  HYSTERECTOMY TOTAL POSSIBLE SALPINGO-OOPHERECTOMY (BILATERAL);  Surgeon: Michael Chandler MD;  Location:  OR     [facility] UGI ENDOSCOPY, SIMPLE EXAM   01/07/08               PERTINENT MEDICATIONS:  Current Outpatient Prescriptions               Current Outpatient Medications   Medication Sig Dispense Refill     acetaminophen (TYLENOL) 500 MG tablet Take 1,000 mg by mouth every 6 hours as needed for mild pain         ALPRAZolam (XANAX) 0.5 MG tablet           baclofen (LIORESAL) 20 MG tablet Take 1 tablet (20 mg) by mouth 3 times daily 90 tablet 1     bisacodyl (DULCOLAX) 5 MG EC tablet Take 1 tablet (5 mg) by mouth every other day Take every other day. If no bowel movement for 2 or more days, take 2 tablets of bisacodyl (10 mg) 30 tablet 2     buprenorphine HCl-naloxone HCl (SUBOXONE) 2-0.5 MG per film Place 0.5 Film under the tongue daily          busPIRone HCl (BUSPAR) 30 MG tablet 30 mg 3 times daily         CONSTULOSE 10 GM/15ML solution TAKE 15 MLS (10 G) BY MOUTH 3 TIMES DAILY AS NEEDED FOR CONSTIPATION (AS NEEDED FOR SEVERE CONSTIPAITON, NO BM FOR MORE THAN 3 DAYS AND FEELING CONSTIPATED) 300 mL 3     cyclobenzaprine (FLEXERIL) 5 MG tablet TAKE ONE TO TWO TABLETS BY MOUTH THREE TIMES A DAY AS NEEDED FOR MUSCLE SPASMS 90 tablet 5     DULoxetine (CYMBALTA) 60 MG capsule Take 1 capsule (60 mg) by mouth 2 times daily. 180 capsule 0     famotidine (PEPCID) 40 MG tablet Take 1 tablet (40 mg) by mouth 2 times daily 60 tablet 1     linaclotide (LINZESS) 72 MCG capsule Take 1 capsule (72 mcg) by mouth every morning (before breakfast). 30 capsule 11     medical cannabis (Patient's own supply) See Admin Instructions. Has [place] Medical Cannabis Card- Purchases through [facility].   (The purpose of this order is to document that the patient reports taking medical cannabis.  This is not a prescription, and is not used to certify that the patient has a qualifying medical condition.)         ondansetron  (ZOFRAN ODT) 4 MG ODT tab DISSOLVE ONE TABLET ON TONGUE EVERY 6 HOURS AS NEEDED FOR NAUSEA 20 tablet 1     oxyCODONE (ROXICODONE) 5 MG tablet Take 1-2 tablets (5-10 mg) by mouth every 6 hours as needed for pain. 15 tablet 0     pantoprazole (PROTONIX) 40 MG EC tablet TAKE ONE TABLET BY MOUTH TWICE A DAY WITH MEALS 60 tablet 5     propranolol (INDERAL) 10 MG tablet           rOPINIRole (REQUIP) 1 MG tablet TAKE ONE TABLET BY MOUTH EVERY NIGHT AT BEDTIME 90 tablet 0     simvastatin (ZOCOR) 10 MG tablet TAKE ONE TABLET BY MOUTH EVERY NIGHT AT BEDTIME 90 tablet 1     temazepam (RESTORIL) 15 MG capsule                     SOCIAL HISTORY:  Social History   Social History                Socioeconomic History     Marital status: Single       Spouse name: Not on file     Number of children: Not on file     Years of education: Not on file     Highest education level: Not on file   Occupational History     Not on file   Tobacco Use     Smoking status: Every Day       Current packs/day: 0.25       Average packs/day: 0.3 packs/day for 20.0 years (5.0 ttl pk-yrs)       Types: Cigarettes       Passive exposure: Never     Smokeless tobacco: Never   Vaping Use     Vaping status: Former     Substances: Nicotine, CBD     Devices: Refillable tank   Substance and Sexual Activity     Alcohol use: Yes       Comment: Occasionaly     Drug use: No     Sexual activity: Not Currently       Partners: Male       Birth control/protection: Female Surgical   Other Topics Concern     Parent/sibling w/ CABG, MI or angioplasty before 65F 55M? No   Social History Narrative     Not on file      Social Determinants of Health              Financial Resource Strain: Low Risk  (1/8/2024)     Financial Resource Strain       Within the past 12 months, have you or your family members you live with been unable to get utilities (heat, electricity) when it was really needed?: No   Food Insecurity: Low Risk  (1/8/2024)     Food Insecurity       Within the past 12  months, did you worry that your food would run out before you got money to buy more?: No       Within the past 12 months, did the food you bought just not last and you didn t have money to get more?: No   Transportation Needs: High Risk (1/8/2024)     Transportation Needs       Within the past 12 months, has lack of transportation kept you from medical appointments, getting your medicines, non-medical meetings or appointments, work, or from getting things that you need?: Yes   Physical Activity: Not on file   Stress: Not on file   Social Connections: Unknown (1/1/2022)     Received from [facility], [facility] [facility] [employer]     Social Connections       Frequency of Communication with Friends and Family: Not on file   Interpersonal Safety: Low Risk  (8/30/2024)     Interpersonal Safety       Do you feel physically and emotionally safe where you currently live?: Yes       Within the past 12 months, have you been hit, slapped, kicked or otherwise physically hurt by someone?: No       Within the past 12 months, have you been humiliated or emotionally abused in other ways by your partner or ex-partner?: No   Recent Concern: Interpersonal Safety - High Risk (8/21/2024)     Interpersonal Safety       Do you feel physically and emotionally safe where you currently live?: No       Within the past 12 months, have you been hit, slapped, kicked or otherwise physically hurt by someone?: No       Within the past 12 months, have you been humiliated or emotionally abused in other ways by your partner or ex-partner?: No   Housing Stability: Low Risk  (1/8/2024)     Housing Stability       Do you have housing? : Yes       Are you worried about losing your housing?: No            FAMILY HISTORY:  Denies colon/panc/esophageal/other GI CA, no other Anderson or other HPS-related Lenka. No IBD/celiac, no other AI/liver/thyroid disease.     Family History             Family History   Problem  Relation Age of Onset     Depression Mother       Respiratory Mother       Chronic Obstructive Pulmonary Disease Mother       Hypertension Mother       Anxiety Disorder Mother       Cerebrovascular Disease Father           First stroke at age 28,  from 2nd when he was 55. Brain aneurysms.     Breast Cancer Cousin       Adrenal Disorder Other       Chronic Obstructive Pulmonary Disease Other       Asthma Brother              PHYSICAL EXAMINATION:  Vitals reviewed   General: Patient appears well in no acute distress.    Skin: No visualized rash or lesions on visualized skin  HEENT:    EOMI, no erythema, sclera icterus or discharge noted.  Mouth mucosa intact, pink, moist  No cervical or supraclavicular lymphadenopathy. Thyroid gland not enlarged.  Resp: breathing comfortably without accessory muscle usage, speaking in full sentences, no cough. Lung sounds clear  Card: Regular and rhythmic S1 and S2. No gallop or rub. No murmur.  No LE edema.  Abdomen: Active bowel sounds X 4 quadrants. Soft to palpation. Tenderness in the RLQ. Reported pain around the midline surgical incision of the upper abdomen. There is a firm round mobile subcutaneous nodule, approximately 1.5 cm in d.   No guarding or rebound tenderness. Garcia's sign negative.  MSK: Appears to have normal range of motion based on visualized movements  Neurologic: No apparent tremors, facial movements symmetric  Psych: affect normal, alert and oriented        ASSESSMENT/PLAN:       ICD-10-CM    1. Gastroesophageal reflux disease with esophagitis without hemorrhage  K21.00       2. Slow transit constipation  K59.01       3. Abdominal mass, unspecified abdominal location  R19.00       4. Hiatal hernia  K44.9       5. Chronic pain syndrome  G89.4          48 year old female  presented to GI clinic for a follow up. She was sent to upper GI endoscopy and BRAVO study for persistent acid reflux and sensation that food and pills are getting stuck in her throat.  "Findings were discussed with the patient. 3 cm hiatal hernia was noted with Hill grade IV GE flap. There was erosive duodenitis. Normal appearance of esophageal mucosa, no abnormalities to explain the patient's complain of dysphagia. Decided to proceed with therapeutic dilation.Patient does not recall if her symptoms had improved after the procedure. Stated that she has been dealing with lots of health issues and feeling overwhelmed.     We reviewed her BRAVO report, which was inconclusive. The overall % acid exposure of 3.9% was not elevated. The DeMeester was 13.6 overall which is not elevated.  There were 32 occurrences of heartburn, 13 of cough, 12 of regurgitation symptoms. SI was 21.9 (7/32), 23.1 (3/13), 33.3 (4/12) respectively overall for acid reflux which does not suggest a correlation. The SAP was 99.9, 95.2, 99.9 respectively which suggests a correlation. Patient stated that she \"probably, pushed buttons too often\" because of overall discomfort in her abdomen and not necessary related to acid reflux. Stated that she is satisfied with her reflux management at this time. Will continue pantoprazole twice a day. Suggested to take famotidine before bed when having  episodes acid reflux. Reminded on GERD friendly diet. Patient stated that she reduced consumption of coffee.      Regarding postoperative pain in upper abdomen, abdominal wall pain vs nerve injury is suspected.  High likelihood of hyperalgesia and chronic pain syndrome contributing to the patient's symptoms. Patient was prescribed nortriptyline but stopped it after taking a few doses, complaining of vivid dreams. Not interested in gabapentin or pregabalin due to side effects of weight gain. Agrees to continue Cymbalta. Will refer to IR for a trial of trigger point injections. Advised to try topicals such as lidocaine patches or diclofenac cream for pain management.  Recommended to continue working with her provider at pain clinic.  If pain persist, " may order a follow up imaging study.  May refer to colonoscopy if continues having RLQ pain although per clinical presentation, it is more consistent with referred pain from low back and/or hip.  I suggested to optimize constipation management. She tried bisacodyl and Miralax before with very minimal improvement in constipation. Used enema and suppository at times.  Had inconclusive colonoscopy report due to poor bowel prep last year.  Recommended to repeat the study in 3 to 5 years given the family history of colonic polyps in mother and maternal grandmother. Patient stated that taking linaclotide daily helps. She does not remembers to take Miralax every day.  Advised to continue both medications.  Patient verbalized understanding and appreciation of care provided. Stated that all of the questions were answered to her/his satisfaction.  RTC in 3 months     Thank you for this consultation. It was a pleasure to participate in the care of this patient; please contact us with any further questions.     JEANNIE Ponce, FNP-C  Division of Gastroenterology  [facility], [place], [place]     This note was created with Dragon voice recognition software, and while reviewed for accuracy, inadvertent minor typographic errors may occur. Please contact the provider if you have any questions.       Again, thank you for allowing me to participate in the care of your patient.        Sincerely,        JEANNIE PONCE CNP

## 2024-11-05 NOTE — TELEPHONE ENCOUNTER
Per patient [employer] message (Brought over from the 10/28/24 encounter):  Linette SRIVASTAVA [facility] (supporting JEANNIE Schroeder CNP)18 hours ago (1:16 PM)   Zev Benavidez,  Can you please call me back? My phone didn't even ring (dumb)! I just had an appointment with my GI doctor, Madleaine Arredondo, and she wanted me to contact you (Pain management team) for stomach injections to help with my post-op pain. Not sure how/when we'd be able to schedule that also. I'm just so overwhelmed with all the injections, procedures, etc, so I'd really like to talk to you about them.  Thank you,  Linette

## 2024-11-05 NOTE — TELEPHONE ENCOUNTER
See the other encounter for more information on pt's recent mychart (11/4/24) as it is unrelated to radiofrequency ablation.       CONG Ocampo-BSN  [facility]   [phone]

## 2024-11-06 NOTE — TELEPHONE ENCOUNTER
"Spoke with patient who would like to discuss \"stomach injection\" for pain as recommended by GI provider.  Writer notes that I am not familiar with these however best course at this time would be to schedule in person with provider to discuss.   Patient is scheduled for 11/14 at 1300.    Patient provides background on current abdominal pain as follows: \"When they did the surgery they did a block for the pain that was supposed to last 17-22 hours.... they were supposed to keep me over night because\" pain was uncontrolled.   Patient notes that she was discharged without pain medication and continues to have uncontrolled pain noting that even briefly standing in line to vote is significantly challenging for her due to pain.     Routing to provider as FYI for upcoming appointment.     Reema Gaffney RN     "

## 2024-11-07 NOTE — PROGRESS NOTES
Video-Visit Details    Type of service:  Video Visit     Originating Location (pt. Location): Home    Distant Location (provider location):  On-site  Platform used for Video Visit: [facility]     Date of visit: 9/30/2024      Assessment:   Radha Beckwith is a 48 year old female with a past medical history significant for chronic bilateral low back pain, depression, SVT, opioid dependence in remission, anxiety, tobacco use, GERD, restless legs who presents with complaints of neck pain, mid and low back.      Low back pain - Onset of pain within last 2 years. Pain is mostly axial, though she does have intermittent radicular leg symptoms, occasionally extends below the knee. On exam, positive facet loading and positive KATHY, sacral thrust and Yeoman's bilaterally. Positive myofascial TTP, negative SLR and neuro exam WNL. Etiology is likely associated with multiple factors, including underlying degenerative changes of lumbar spine, facet and SIJ mediated components, with prominent overlying myofascial component.   Neck/thoracic back pain - Onset of pain within last 2 years. On exam, positive for myofascial TTP, most notably in lower thoracic paraspinals. Etiology is likely associated with multiple factors, including underlying degenerative changes, with prominent overlying myofascial component.      Assigned to [facility] nursing team.     Visit Diagnoses:  1. Chronic pain syndrome    2. Chronic bilateral thoracic back pain    3. Pain of both sacroiliac joints    4. Chronic bilateral low back pain with bilateral sciatica    5. Myofascial pain    6. Chronic neck pain    7. Generalized abdominal pain        Plan:  Diagnosis reviewed, treatment option addressed, and risk/benifits discussed.  Self-care instructions given.  I am recommending a multidisciplinary treatment plan to help this patient better manage their pain.      Pain Physical Therapy:  Recent PT for low back/SI and neck  pain. Continues HEP.      Pain Psychology: YES - last visit with Karen Kohler PhD on 9/30/24.      Diagnostic Studies:  None      Medication Management:   Duloxetine -  Current dose 60 mg twice daily, increased from 90 mg/day to 120 mg/day at last visit. Will discuss outcome with dose adjustment at follow up.   Muscle relaxants:   Continue baclofen 20 mg 3 times daily.  Reports enhanced effect with adding afternoon dose, mild daytime sedation effects are manageable.  Of note, she reports recently starting buspirone 3 times daily, which may also be contributing to CNS depression effects.  Recommended taking baclofen and buspirone at separate times.   Flexeril - 5-10 mg twice daily as needed. PCP managing. Recommend reducing use to once daily as needed if helpful on days with increased pain.   Allow 4-6 hours in between all muscle relaxant doses, do not mix with alcohol.   [place] - [place] at last [place] on 1/3/24. No clear benefit for pain with daytime products, some benefit at night with products used at bedtime.       Potential procedures:   Bilateral cervical 3,4,5 medial branch nerve block #1 on 6/27/24. She was scheduled for MBB #2 on 9/10/24 but it appears this was not completed.   Will revisit CMBB/RFA plan to target neck pain at follow up.      Other Orders/Referrals:   Recommend using TENS unit a few times throughout the day as needed, particularly when engaging in activities that may increase pain.      Follow up with JEANNIE Schroeder CNP on 10/1/24 at 9:30 am via video visit. This visit will focus on covering additional concerns that were not addressed today due to time constraints.     Additional visit details: Linette sent messages via [employer] prior to today's visit detailing multiple concerns related to poorly managed pain, not clear on provider roles of treatment team. Due to time constraints, we did not have enough time to address all of her concerns. She  "agreed to scheduling another follow up as an extension of visit to finishing addressing current concerns.     Review of Electronic Chart: Today I have also reviewed available medical information in the patient's medical record at [facility] ([facility]) and Care Everywhere (if available), including relevant provider notes, laboratory work, and imaging.     Jenny Landrum, NAOMI, APRN, AGNP-C  [facility]     -------------------------------------------------    Subjective:    Chief complaint:   Chief Complaint   Patient presents with    Pain    RECHECK     Follow up with pain management-increased pain, more than \"Iwas\"       Interval history:  Radha Beckwith is a 48 year old female last seen on 8/28/24.  They are a patient of mine seen in follow up.     Recommendations/plan at the last visit included:  Pain Physical Therapy:  Recent PT for low back/SI and neck pain. Continue therapy and activity as tolerated at home.      Pain Psychologist to address relaxation, behavioral change, coping style, and other factors important to improvement.  YES - continue working with Karen Kohler PhD.      Diagnostic Studies:  None      Medication Management:   Duloxetine -  Current dose 60 mg in morning and 30 mg at bedtime. Appreciates benefit without side effects, the pain control optimized.  Recommend dose increase to 60 mg twice daily, monitor for enhanced pain benefit.  Updated prescription sent to pharmacy today.  Gabapentin tapered and she discontinued 300 mg dose in the morning since last visit.  Of note, she requested to taper off medication due to concerns for weight gain.  She reports 20 pound weight loss since stopping gabapentin.  Muscle relaxants:   Continue baclofen 20 mg 3 times daily.  Reports enhanced effect with adding afternoon dose, mild daytime sedation effects are manageable.  Of note, she reports recently starting buspirone 3 times daily, which may also be contributing to CNS " "depression effects.  Recommended taking baclofen and buspirone at separate times.   Flexeril - 5-10 mg twice daily as needed. PCP managing. Recommend reducing use to once daily as needed if helpful on days with increased pain.   Allow 4-6 hours in between all muscle relaxant doses, do not mix with alcohol.   [place] 1/3/24. No clear benefit for pain with daytime products, some benefit at night with products used at bedtime.       Potential procedures:   Bilateral cervical 3,4,5 medial branch nerve block #1 on 6/27/24. She was not able to evaluate pain relief accurately due to benzo use for anxiety. MBB #2 scheduled on 9/10/24.  Pending outcome with second block, we will consider repeat MBB #1 if appropriate/indicated.  I recommended she discuss results directly with nursing team and compare outcome to first block.   Advised that we may encounter insurance coverage issues with repeat MBB #1, though I do think we have reasonable grounds for appeal if denied due to benzodiazepine use for first block, which affected her cognitive functioning/ability to fairly evaluate results, and utilizing local anesthetic only for the second block     Other Orders/Referrals:   Recommend using TENS unit a few times throughout the day as needed, particularly when engaging in activities that may increase pain.      Follow up with JEANNIE Schroeder CNP in around 6 weeks for medication management, video visit okay.     Since her last visit, Radha Beckwith reports:  -She reports pain has gotten much worse. Mood and ADHD has worsened as well.   -She sent a lengthy [employer] message yesterday to clarify my role in her care.   -She has been seen both internally/externally.   -She has engaged in extensive PT over time. She states that she's \"fallen off\" with PT appts for now, continues HEP.   -She notes that it is hard to remember which providers are managing different " conditions/areas of pain.   -She asks if chiropractor is a good idea for her.   -She had hernia repair surgery on 8/30/24, concerns for complications and increased pain. States her friend (who attended ED visit with her after hernia surgery) advised she should have never gone forward with surgery. Friend states that the surgery would never work due to her body type.   -She notes that Suboxone was started 15+ years ago, was on methadone in the past.   -She asks if she should contact surgeon since it has been 1 month since surgery and still having more pain and different symptoms than pre-op.   -She will prepare a list of painful areas/concerns for next visit and will make sure we are clear on plan moving forward.     Members of Care Team/Location:  -External psych/MH counselor, only providers outside the [facility] system.   -Chiropractor: [facility]. Appointments intermittent, goes in when pain is increased. She notes feeling good when she is walking out of clinic after adjustment, but also thinks pain symptoms may get flared up after adjustments. Tx includes light touch/massage, spinal adjustment.   -Dr. Dozier performed hernia repair surgery. Reports hernia recurrence after surgery, ED visits. Continues to have significant pain after surgery.     PEG: A Three-Item Scale Assessing Pain Intensity and Interference    What number best describes your PAIN ON AVERAGE in the past week? 8    What number best describes how, during the past week, pain has interfered with your ENJOYMENT OF LIFE? 10 - Completely interferes    What number best describes how, during the past week, pain has interfered with your GENERAL ACTIVITY? 10 - Completely interferes    PEG Total Score: 9.33    Fabian EE, Hayden KA, Karie BEAVERS, Anjana HARRISON, Henri J, Joyce CHI, Ban SM, Brandie K. Development and initial validation of the PEG, a 3-item scale assessing pain intensity and interference. Journal of General Internal Medicine. 2009  "[place];24:733-735.        HPI/Interval history from last visit on 8/28/24:  -She states her pain has been \"crappy\" since last visit.   -She was referred to Karen Kohler for pain psych. She has started with   -She had CMBB #1 on 6/27, took benzo for anxiety and fell asleep after the injection. Not able to assess pain relief accurately. She has second block coming up on 9/10, will not be taking benzo. Pending outcome with injection, will consider repeat MBB #1.   -Duloxetine dose 60 mg in AM and 30 mg in evening. She appreciates benefit with sciatica pain, less intense symptoms.   -She stopped gabapentin 300 mg in morning since last visit. She reports 20 pound weight loss since stopping medication. She had reported concerns for weight gain on gabapentin.   -She has hernia surgery on 8/30.   -She is interested in dose increase of duloxetine to 60 mg BID.   -She reports having more muscle spasms in low back, takes muscle relaxants.   -Baclofen dose 20 mg TID. She appreciates benefit with use.         Current Pain Treatments:                Suboxone 2-0.5 mg daily (weaning off)              Ropinirole 1 mg at bedtime               Cyclobenzaprine 5-10 mg QD PRN                 Baclofen 20 mg TID              Duloxetine 60 mg BID                 TENS              PT - low back/SI and recent referral for neck pain completed. Continues HEP.              CMBB/RFA - MBB #2 not completed on 9/10, will revisit plan at follow up        Current MME: N/A     Review of [place] Prescription Monitoring Program (): No concern for abuse or misuse of controlled medications based on this report. Reviewed - appears appropriate.      Past pain treatments:  SI joint injection 5/16/23 -significant benefit x1 month, then diminishing efficacy  KINDRA on 4/19/24 with Dr. Ivory - marginal benefit   Gabapentin       Medications:  Current Outpatient Medications   Medication Sig Dispense Refill    acetaminophen (TYLENOL) 500 MG tablet Take 1,000 " mg by mouth every 6 hours as needed for mild pain      ALPRAZolam (XANAX) 0.5 MG tablet       baclofen (LIORESAL) 20 MG tablet Take 1 tablet (20 mg) by mouth 3 times daily 90 tablet 1    bisacodyl (DULCOLAX) 5 MG EC tablet Take 1 tablet (5 mg) by mouth every other day Take every other day. If no bowel movement for 2 or more days, take 2 tablets of bisacodyl (10 mg) 30 tablet 2    buprenorphine HCl-naloxone HCl (SUBOXONE) 2-0.5 MG per film Place 0.5 Film under the tongue daily       busPIRone HCl (BUSPAR) 30 MG tablet 30 mg 3 times daily      CONSTULOSE 10 GM/15ML solution TAKE 15 MLS (10 G) BY MOUTH 3 TIMES DAILY AS NEEDED FOR CONSTIPATION (AS NEEDED FOR SEVERE CONSTIPAITON, NO BM FOR MORE THAN 3 DAYS AND FEELING CONSTIPATED) 300 mL 3    cyclobenzaprine (FLEXERIL) 5 MG tablet TAKE ONE TO TWO TABLETS BY MOUTH THREE TIMES A DAY AS NEEDED FOR MUSCLE SPASMS 90 tablet 5    DULoxetine (CYMBALTA) 60 MG capsule Take 1 capsule (60 mg) by mouth 2 times daily. 180 capsule 0    famotidine (PEPCID) 40 MG tablet Take 1 tablet (40 mg) by mouth 2 times daily 60 tablet 1    linaclotide (LINZESS) 72 MCG capsule Take 1 capsule (72 mcg) by mouth every morning (before breakfast). 30 capsule 11    medical cannabis (Patient's own supply) See Admin Instructions. [place] Medical [place] Card- Purchases through [facility].   (The purpose of this order is to document that the patient reports taking medical cannabis.  This is not a prescription, and is not used to certify that the patient has a qualifying medical condition.)      ondansetron (ZOFRAN ODT) 4 MG ODT tab DISSOLVE ONE TABLET ON TONGUE EVERY 6 HOURS AS NEEDED FOR NAUSEA 20 tablet 1    pantoprazole (PROTONIX) 40 MG EC tablet TAKE ONE TABLET BY MOUTH TWICE A DAY WITH MEALS 60 tablet 5    rOPINIRole (REQUIP) 1 MG tablet TAKE ONE TABLET BY MOUTH EVERY NIGHT AT BEDTIME 90 tablet 0    simvastatin (ZOCOR) 10 MG tablet TAKE ONE TABLET BY MOUTH EVERY NIGHT AT BEDTIME 90 tablet  1    temazepam (RESTORIL) 15 MG capsule       propranolol (INDERAL) 10 MG tablet          Medical History: any changes in medical history since they were last seen? Yes - multiple ED visits, r/t uncontrolled pain after hernia surgery on 8/30/24.       Objective:    Physical Exam:  GENERAL: alert and no distress  EYES: Eyes grossly normal to inspection.  No discharge or erythema, or obvious scleral/conjunctival abnormalities.  RESP: No audible wheeze, cough, or visible cyanosis.    SKIN: Visible skin clear. No significant rash, abnormal pigmentation or lesions.  NEURO: Cranial nerves grossly intact.  Mentation and speech appropriate for age.  PSYCH: Appropriate affect, tone, and pace of words     Diagnostic Tests/Imaging/Labs:  CMP on 9/5/24 - WNL, GFR >90  Reviewed recent pelvic MRI and abdominal CTs - MRI unremarkable, CT scans demonstrated postsurgical changes though no urgent findings.       BILLING TIME DOCUMENTATION:   The total TIME spent on this patient on the date of the encounter/appointment was 60 minutes.      TOTAL TIME includes:   Time spent preparing to see the patient (reviewing records and tests)   Time spent face to face (or over the phone) with the patient   Time spent ordering tests, medications, procedures and referrals   Time spent Referring and communicating with other healthcare professionals   Time spent documenting clinical information in Epic      PCP

## 2024-11-08 ENCOUNTER — TELEPHONE (OUTPATIENT)
Dept: SURGERY | Facility: CLINIC | Age: 49
End: 2024-11-08
Payer: COMMERCIAL

## 2024-11-08 NOTE — TELEPHONE ENCOUNTER
Writer spoke with patient. Patient is scheduled for next week.    Mirela Schulz RN on 11/8/2024 at 2:56 PM

## 2024-11-08 NOTE — TELEPHONE ENCOUNTER
Reason for Call:  Other call back    Detailed comments: Linette is calling today to talk with Dr Dozier or a nurse. She states she had a hernia repair done Aug 30th by him and is still having pain.    She contacted her pain management clinic and they asked her to get in touch with her surgeon to see if there is anything he can do to help her get through until her appointment with them next week.    She says her pain never has resolved since her surgery and is getting worse.     Phone Number Patient can be reached at: Cell number on file:    Telephone Information:   Mobile [phone]       Best Time: any    Can we leave a detailed message on this number? YES    Call taken on 11/8/2024 at 9:49 AM by Keenan Holt

## 2024-11-08 NOTE — TELEPHONE ENCOUNTER
Information noted.     Jenny Landrum, NAOMI, APRN, AGNP-C  [facility]

## 2024-11-10 ASSESSMENT — ANXIETY QUESTIONNAIRES
5. BEING SO RESTLESS THAT IT IS HARD TO SIT STILL: NEARLY EVERY DAY
7. FEELING AFRAID AS IF SOMETHING AWFUL MIGHT HAPPEN: NEARLY EVERY DAY
1. FEELING NERVOUS, ANXIOUS, OR ON EDGE: NEARLY EVERY DAY
GAD7 TOTAL SCORE: 19
2. NOT BEING ABLE TO STOP OR CONTROL WORRYING: NEARLY EVERY DAY
6. BECOMING EASILY ANNOYED OR IRRITABLE: SEVERAL DAYS
GAD7 TOTAL SCORE: 19
8. IF YOU CHECKED OFF ANY PROBLEMS, HOW DIFFICULT HAVE THESE MADE IT FOR YOU TO DO YOUR WORK, TAKE CARE OF THINGS AT HOME, OR GET ALONG WITH OTHER PEOPLE?: EXTREMELY DIFFICULT
GAD7 TOTAL SCORE: 19
7. FEELING AFRAID AS IF SOMETHING AWFUL MIGHT HAPPEN: NEARLY EVERY DAY
4. TROUBLE RELAXING: NEARLY EVERY DAY
3. WORRYING TOO MUCH ABOUT DIFFERENT THINGS: NEARLY EVERY DAY
IF YOU CHECKED OFF ANY PROBLEMS ON THIS QUESTIONNAIRE, HOW DIFFICULT HAVE THESE PROBLEMS MADE IT FOR YOU TO DO YOUR WORK, TAKE CARE OF THINGS AT HOME, OR GET ALONG WITH OTHER PEOPLE: EXTREMELY DIFFICULT

## 2024-11-11 ENCOUNTER — OFFICE VISIT (OUTPATIENT)
Dept: SURGERY | Facility: CLINIC | Age: 49
End: 2024-11-11
Payer: COMMERCIAL

## 2024-11-11 ENCOUNTER — VIRTUAL VISIT (OUTPATIENT)
Dept: PSYCHOLOGY | Facility: CLINIC | Age: 49
End: 2024-11-11
Payer: COMMERCIAL

## 2024-11-11 VITALS
DIASTOLIC BLOOD PRESSURE: 82 MMHG | BODY MASS INDEX: 27.64 KG/M2 | WEIGHT: 161 LBS | SYSTOLIC BLOOD PRESSURE: 122 MMHG | TEMPERATURE: 96.7 F

## 2024-11-11 DIAGNOSIS — Z98.890 S/P HERNIA REPAIR: ICD-10-CM

## 2024-11-11 DIAGNOSIS — F41.1 GENERALIZED ANXIETY DISORDER: ICD-10-CM

## 2024-11-11 DIAGNOSIS — Z87.19 S/P HERNIA REPAIR: ICD-10-CM

## 2024-11-11 DIAGNOSIS — G89.18 POST-OP PAIN: Primary | ICD-10-CM

## 2024-11-11 DIAGNOSIS — F33.1 MODERATE EPISODE OF RECURRENT MAJOR DEPRESSIVE DISORDER (H): Primary | ICD-10-CM

## 2024-11-11 PROCEDURE — 90837 PSYTX W PT 60 MINUTES: CPT | Mod: 95 | Performed by: COUNSELOR

## 2024-11-11 PROCEDURE — 99213 OFFICE O/P EST LOW 20 MIN: CPT | Performed by: SPECIALIST

## 2024-11-11 RX ORDER — OXYCODONE HYDROCHLORIDE 5 MG/1
5 TABLET ORAL EVERY 6 HOURS PRN
Qty: 12 TABLET | Refills: 0 | Status: SHIPPED | OUTPATIENT
Start: 2024-11-11 | End: 2024-11-14

## 2024-11-11 ASSESSMENT — PAIN SCALES - GENERAL: PAINLEVEL_OUTOF10: EXTREME PAIN (8)

## 2024-11-11 NOTE — PROGRESS NOTES
Follow-up for robotically assisted ventral hernia repair the end of August    Subjective:  Patient feels okay.  She continues to have chronic pain at the site and with bending.  She feels her pain is getting worse and now moved to her costal margins.  Still complaining of chronic pain.  Was offered injections by pain management.  Those are to be scheduled yet    Objective:  B/P: 132/82, T: 96.5, P: Data Unavailable, R: Data Unavailable  Abdomen: Incisions healing well.  No signs of infection.  Hernia repair intact.  No mass.  Tender lipomas upper abdomen.      EXAM: CT ABDOMEN PELVIS W CONTRAST  LOCATION: [facility]  DATE: 10/4/2024     INDICATION: Incisional pain epigastric.  COMPARISON: 9/13/2024.  TECHNIQUE: CT scan of the abdomen and pelvis was performed following injection of IV contrast. Multiplanar reformats were obtained. Dose reduction techniques were used.  CONTRAST: 78 mL Isovue-370.     FINDINGS:   LOWER CHEST: Normal.     HEPATOBILIARY: Cholecystectomy.     PANCREAS: Normal.     SPLEEN: Calcified granuloma.     ADRENAL GLANDS: Normal.     KIDNEYS/BLADDER: Normal.     BOWEL: Previous upper midline ventral abdominal hernia repair with continued decrease in size of well-defined fatty nodule at the repair site probably fatty necrosis. Minimal amount of soft tissue surrounding infiltration.     LYMPH NODES: Normal.     VASCULATURE: Normal.     PELVIC ORGANS: Hysterectomy.     MUSCULOSKELETAL: Normal.                                                                      IMPRESSION:   1.  Previous upper midline ventral abdominal hernia repair. Decreased size of the well-defined fatty density nodule at the hernia repair site now measuring 1.6 cm. This is probably evolving fat necrosis. There is a small amount of soft tissue   infiltration. No residual hernia.     2.  Cholecystectomy and hysterectomy.      Assessment/plan:  This is a 48-year-old lady status post a robotic ventral  hernia repair.  Doing well other than her known chronic pain issues.  Now reports the pain getting worse and at different locations..  She is asking for narcotics.  Is also supposed to be scheduled for injections with pain management.  She is not seeing them till later this week and would like something to hold her over.  I also referred her for second opinion versus mesh removal.  I explained that even if we do remove the mesh it most likely will not relieve her pain.  After extensive discussion with the patient we will get a CAT scan to reevaluate the area.  Will give her a short pain prescription until she can be reevaluated by pain management.  I also encouraged her to get second opinions.  She will call me once the CAT scans been completed.      Michael Dozier MD, FACS

## 2024-11-11 NOTE — LETTER
11/11/2024      Radha Beckwith  [address] [place] [address]      Dear Colleague,    Thank you for referring your patient, Radha Beckwith, to the [facility]. Please see a copy of my visit note below.    Follow-up for robotically assisted ventral hernia repair the end of August    Subjective:  Patient feels okay.  She continues to have chronic pain at the site and with bending.  She feels her pain is getting worse and now moved to her costal margins.  Still complaining of chronic pain.  Was offered injections by pain management.  Those are to be scheduled yet    Objective:  B/P: 132/82, T: 96.5, P: Data Unavailable, R: Data Unavailable  Abdomen: Incisions healing well.  No signs of infection.  Hernia repair intact.  No mass.  Tender lipomas upper abdomen.      EXAM: CT ABDOMEN PELVIS W CONTRAST  LOCATION: [facility]  DATE: 10/4/2024     INDICATION: Incisional pain epigastric.  COMPARISON: 9/13/2024.  TECHNIQUE: CT scan of the abdomen and pelvis was performed following injection of IV contrast. Multiplanar reformats were obtained. Dose reduction techniques were used.  CONTRAST: 78 mL Isovue-370.     FINDINGS:   LOWER CHEST: Normal.     HEPATOBILIARY: Cholecystectomy.     PANCREAS: Normal.     SPLEEN: Calcified granuloma.     ADRENAL GLANDS: Normal.     KIDNEYS/BLADDER: Normal.     BOWEL: Previous upper midline ventral abdominal hernia repair with continued decrease in size of well-defined fatty nodule at the repair site probably fatty necrosis. Minimal amount of soft tissue surrounding infiltration.     LYMPH NODES: Normal.     VASCULATURE: Normal.     PELVIC ORGANS: Hysterectomy.     MUSCULOSKELETAL: Normal.                                                                      IMPRESSION:   1.  Previous upper midline ventral abdominal hernia repair. Decreased size of the well-defined fatty density nodule at the hernia repair site now measuring  1.6 cm. This is probably evolving fat necrosis. There is a small amount of soft tissue   infiltration. No residual hernia.     2.  Cholecystectomy and hysterectomy.      Assessment/plan:  This is a 48-year-old lady status post a robotic ventral hernia repair.  Doing well other than her known chronic pain issues.  Now reports the pain getting worse and at different locations..  She is asking for narcotics.  Is also supposed to be scheduled for injections with pain management.  She is not seeing them till later this week and would like something to hold her over.  I also referred her for second opinion versus mesh removal.  I explained that even if we do remove the mesh it most likely will not relieve her pain.  After extensive discussion with the patient we will get a CAT scan to reevaluate the area.  Will give her a short pain prescription until she can be reevaluated by pain management.  I also encouraged her to get second opinions.  She will call me once the CAT scans been completed.      Michael Dozier MD, FACS      Again, thank you for allowing me to participate in the care of your patient.        Sincerely,        Michael Dozier MD

## 2024-11-11 NOTE — PROGRESS NOTES
Answers submitted by the patient for this visit:  Patient Health Questionnaire (Submitted on 11/10/2024)  If you checked off any problems, how difficult have these problems made it for you to do your work, take care of things at home, or get along with other people?: Extremely difficult  PHQ9 TOTAL SCORE: 21  Patient Health Questionnaire (G7) (Submitted on 11/10/2024)  BO 7 TOTAL SCORE: 19          [facility]                                     Progress Note    Patient Name: Radha Beckwith  Date: 11/11/24         Service Type: Individual      Session Start Time: 3:40pm Session End Time:  4:40pm     Session Length:  60    Session #: 59    Attendees: Client    Service Modality:  telephone      Provider verified identity through the following two step process.  Patient provided:  Patient is known previously to provider    Telemedicine Visit: The patient's condition can be safely assessed and treated via synchronous audio and visual telemedicine encounter.      Reason for Telemedicine Visit: Patient convenience (e.g. access to timely appointments / distance to available provider)    Originating Site (Patient Location): Patient's home    Distant Site (Provider Location): Provider Remote Setting- Home Office    Consent:  The patient/guardian has verbally consented to: the potential risks and benefits of telemedicine (video visit) versus in person care; bill my insurance or make self-payment for services provided; and responsibility for payment of non-covered services.     Patient would like the video invitation sent by:  My Chart    Mode of Communication:  video    Distant Location (Provider):  Off-site home office    As the provider I attest to compliance with applicable laws and regulations related to telemedicine.    DATA  Interactive Complexity: No  Crisis: No        Progress Since Last Session (Related to Symptoms / Goals / Homework):   Symptoms: No change .    Homework: Completed in  session      Episode of Care Goals: Satisfactory progress - MAINTENANCE (Working to maintain change, with risk of relapse); Intervened by continuing to positively reinforce healthy behavior choice      Current / Ongoing Stressors and Concerns:   Client stated she is at the pharmacy getting a prescription and couldn't do a video session.   Stated her pain has only been getting worse. Stated it's been impacting her breathing.        Treatment Objective(s) Addressed in This Session:   Increase interest, engagement, and pleasure in doing things  Feel less tired and more energy during the day        Intervention:   Validated client's frustrations about her medical procedures and her hopeless feeling from the appointment today. Helped the client work through current level of anger in vivo utilizing deep breathing and distraction skills. Validated the client about her frustrations regarding her boyfriend and how dysregulated he has been too.     Assessments completed prior to visit:  The following assessments were completed by patient for this visit:  PHQ9:       9/23/2024    11:46 AM 10/3/2024     2:00 PM 10/7/2024    10:50 AM 10/14/2024     3:26 PM 10/27/2024     7:57 PM 11/4/2024    12:53 PM 11/10/2024     7:02 PM   PHQ-9 SCORE   PHQ-9 Total Score MyChart 25 (Severe depression) 22 (Severe depression) 24 (Severe depression) 23 (Severe depression) 23 (Severe depression) 22 (Severe depression) 21 (Severe depression)   PHQ-9 Total Score 25 22 24 23 23  22  21        Patient-reported     GAD7:       7/25/2024    11:52 AM 8/13/2024     5:35 PM 8/28/2024     2:06 PM 9/16/2024     3:27 PM 10/3/2024     1:58 PM 10/27/2024     7:59 PM 11/10/2024     7:03 PM   BO-7 SCORE   Total Score 21 (severe anxiety) 19 (severe anxiety) 21 (severe anxiety) 20 (severe anxiety) 20 (severe anxiety) 20 (severe anxiety) 19 (severe anxiety)   Total Score 21 19    19 21 20    20 20    20 20  19        Patient-reported    Multiple values from one day  are sorted in reverse-chronological order     PROMIS 10-Global Health (all questions and answers displayed):       4/10/2024     1:58 PM 4/17/2024     1:28 PM 7/25/2024    11:56 AM 8/4/2024     7:20 PM 8/10/2024     2:51 PM 8/18/2024    11:36 AM 8/28/2024     2:07 PM   PROMIS 10   In general, would you say your health is: Poor Poor Poor Poor Fair Poor Poor   In general, would you say your quality of life is: Poor Poor Fair Poor Poor Fair Fair   In general, how would you rate your physical health? Poor Poor Poor Poor Poor Poor Fair   In general, how would you rate your mental health, including your mood and your ability to think? Poor Poor Poor Poor Poor Poor Poor   In general, how would you rate your satisfaction with your social activities and relationships? Poor Poor Poor Fair Poor Fair Fair   In general, please rate how well you carry out your usual social activities and roles Fair Poor Poor Poor Poor Fair Fair   To what extent are you able to carry out your everyday physical activities such as walking, climbing stairs, carrying groceries, or moving a chair? A little A little A little Moderately A little A little A little   In the past 7 days, how often have you been bothered by emotional problems such as feeling anxious, depressed, or irritable? Always Always Always Always Always Always Always   In the past 7 days, how would you rate your fatigue on average? Severe Very severe Severe Severe Very severe Very severe Severe   In the past 7 days, how would you rate your pain on average, where 0 means no pain, and 10 means worst imaginable pain? 7 7 7 7 7 7 7   In general, would you say your health is: 1  1  1  1  2  1  1    In general, would you say your quality of life is: 1  1  2  1  1  2  2    In general, how would you rate your physical health? 1  1  1  1  1  1  2    In general, how would you rate your mental health, including your mood and your ability to think? 1  1  1  1  1  1  1    In general, how would you  rate your satisfaction with your social activities and relationships? 1  1  1  2  1  2  2    In general, please rate how well you carry out your usual social activities and roles. (This includes activities at home, at work and in your community, and responsibilities as a parent, child, spouse, employee, friend, etc.) 2  1  1  1  1  2  2    To what extent are you able to carry out your everyday physical activities such as walking, climbing stairs, carrying groceries, or moving a chair? 2  2  2  3  2  2  2    In the past 7 days, how often have you been bothered by emotional problems such as feeling anxious, depressed, or irritable? 5  5  5  5  5  5  5    In the past 7 days, how would you rate your fatigue on average? 4  5  4  4  5  5  4    In the past 7 days, how would you rate your pain on average, where 0 means no pain, and 10 means worst imaginable pain? 7  7  7  7  7  7  7    Global Mental Health Score 4    4 4    4 5    5 5 4    4 6 6   Global Physical Health Score 7    7 6    6 7    7 8 6    6 6 8   PROMIS TOTAL - SUBSCORES 11    11 10    10 12    12 13 10    10 12 14       Patient-reported    Multiple values from one day are sorted in reverse-chronological order     [place] Suicide Severity Rating Scale (Lifetime/Recent)      5/13/2024     3:13 PM 8/21/2024    10:30 AM 8/30/2024     8:20 AM 8/31/2024    12:07 PM 9/5/2024     5:33 PM 9/13/2024    10:52 AM 10/4/2024     4:54 PM   [place] Suicide Severity Rating (Lifetime/Recent)   Q1 Wished to be Dead (Past Month) 0-->no 0-->no 0-->no 0-->no 0-->no 0-->no 0-->no   Q2 Suicidal Thoughts (Past Month) 0-->no 0-->no 0-->no 0-->no 0-->no 0-->no 0-->no   Q6 Suicide Behavior (Lifetime) 0-->no 0-->no 0-->no 0-->no 1-->yes 0-->no 0-->no   If yes to Q6, within past 3 months?     0-->no     Level of Risk per Screen no risks indicated no risks indicated no risks indicated no risks indicated moderate risk no risks indicated no risks indicated         ASSESSMENT: Current  Emotional / Mental Status (status of significant symptoms):   Risk status (Self / Other harm or suicidal ideation)   Patient denies current fears or concerns for personal safety.   Patient denies current or recent suicidal ideation or behaviors.   Patient denies current or recent homicidal ideation or behaviors.   Patient denies current or recent self injurious behavior or ideation.   Patient denies other safety concerns.   Patient reports there has been no change in risk factors since their last session.     Patient reports there has been no change in protective factors since their last session.     Recommended that patient call 911 or go to the local ED should there be a change in any of these risk factors.     Appearance:   Unable to assess due to phone session    Eye Contact:   Unable to assess due to phone session    Psychomotor Behavior: Unable to assess due to phone session    Attitude:   Cooperative    Orientation:   All   Speech    Rate / Production: Normal/ Responsive Normal     Volume:  Normal    Mood:    Normal   Affect:    Unable to assess due to phone session    Thought Content:  Clear    Thought Form:  Coherent    Insight:    Good      Medication Review:   No changes to current psychiatric medication(s)     Medication Compliance:   Yes     Changes in Health Issues:   None reported     Chemical Use Review:   Substance Use: Chemical use reviewed, no active concerns identified      Tobacco Use: No change in amount of tobacco use since last session.  Patient declined discussion at this time    Diagnosis:  1. Moderate episode of recurrent major depressive disorder (H)    2. Generalized anxiety disorder            Collateral Reports Completed:   Not Applicable    PLAN: (Patient Tasks / Therapist Tasks / Other)  Continue to utilize stress reduction skills daily.   Continue to use distraction skills if feeling activated again.         Ricarda Bhatia [facility]                                                          ______________________________________________________________________    Individual Treatment Plan    Patient's Name: Radha Beckwith  YOB: 1975    Date of Creation: 6/28/23  Date Treatment Plan Last Reviewed/Revised: 10/14/24    DSM5 Diagnoses: 296.32 (F33.1) Major Depressive Disorder, Recurrent Episode, Moderate _  Psychosocial / Contextual Factors: living with boyfriend, memory issues  PROMIS (reviewed every 90 days):     Referral / Collaboration:  Referral to another professional/service is not indicated at this time..    Anticipated number of session for this episode of care: 9-12 sessions  Anticipation frequency of session:  as needed  Anticipated Duration of each session: 38-52 minutes  Treatment plan will be reviewed in 90 days or when goals have been changed.       MeasurableTreatment Goal(s) related to diagnosis / functional impairment(s)  Goal 1: Patient will increase communication skills with family members.    Objective #A (Patient Action)    Patient will learn & utilize at least 2 assertive communication skills weekly.  Status: Continued - Date(s): 10/14/24    Intervention(s)  Therapist will teach emotional regulation skills. distress tolerance skills, interpersonal effectiveness skills, emotion regluation skills, mindfulness skills, radical acceptance. Therapist will teach client how to ID body cues for anxiety, anxiety reduction techniques, how to ID triggers for depression and anxiety- decrease reactivity/eliminate, lifestyle changes to reduce depression and anxiety, communication skills, explore cognitive beliefs and help client to develop healthy cognitive patterns and beliefs.    Objective #B  Patient will use thought-stopping strategy daily to reduce intrusive thoughts.  Status: Continued - Date(s): 10/14/24    Intervention(s)  Therapist will teach emotional regulation skills. distress tolerance skills, interpersonal effectiveness skills, emotion regluation skills,  mindfulness skills, radical acceptance. Therapist will teach client how to ID body cues for anxiety, anxiety reduction techniques, how to ID triggers for depression and anxiety- decrease reactivity/eliminate, lifestyle changes to reduce depression and anxiety, communication skills, explore cognitive beliefs and help client to develop healthy cognitive patterns and beliefs.      Goal 2: Patient will decrease depression symptoms.      Objective #A (Patient Action)    Status: Continued - Date(s): 10/14/24    Patient will Increase interest, engagement, and pleasure in doing things  Decrease frequency and intensity of feeling down, depressed, hopeless  Improve quantity and quality of night time sleep / decrease daytime naps  Feel less tired and more energy during the day   Improve diet, appetite, mindful eating, and / or meal planning  Identify negative self-talk and behaviors: challenge core beliefs, myths, and actions  Improve concentration, focus, and mindfulness in daily activities   Feel less fidgety, restless or slow in daily activities / interpersonal interactions.    Intervention(s)  Therapist will teach emotional regulation skills. distress tolerance skills, interpersonal effectiveness skills, emotion regluation skills, mindfulness skills, radical acceptance. Therapist will teach client how to ID body cues for anxiety, anxiety reduction techniques, how to ID triggers for depression and anxiety- decrease reactivity/eliminate, lifestyle changes to reduce depression and anxiety, communication skills, explore cognitive beliefs and help client to develop healthy cognitive patterns and beliefs.    Objective #B  Patient will identify three distraction and diversion activities and use those activities to decrease level of anxiety  .    Status: Continued - Date(s):  10/14/24    Intervention(s)  Therapist will teach emotional regulation skills. distress tolerance skills, interpersonal effectiveness skills, emotion  regluation skills, mindfulness skills, radical acceptance. Therapist will teach client how to ID body cues for anxiety, anxiety reduction techniques, how to ID triggers for depression and anxiety- decrease reactivity/eliminate, lifestyle changes to reduce depression and anxiety, communication skills, explore cognitive beliefs and help client to develop healthy cognitive patterns and beliefs.      Patient has reviewed and agreed to the above plan.      Ricarda Bhatia [facility]

## 2024-11-12 ENCOUNTER — TELEPHONE (OUTPATIENT)
Dept: SURGERY | Facility: CLINIC | Age: 49
End: 2024-11-12
Payer: COMMERCIAL

## 2024-11-12 NOTE — TELEPHONE ENCOUNTER
[facility] called and stated that pt told them that she was to get 325 tablets not 12 that we gave her. I informed them that she was only given 12 and will not be getting anymore from general surgery.   Edith Campa MA

## 2024-11-13 NOTE — PROGRESS NOTES
[facility] Pain Management     Date of visit: 11/14/2024      Assessment:   Radha Beckwith is a 48 year old female with a past medical history significant for chronic bilateral low back pain, depression, SVT, opioid dependence in remission, anxiety, tobacco use, GERD, restless legs who presents with complaints of neck pain, mid and low back.      Low back pain - Onset of pain within last 2 years. Pain is mostly axial, though she does have intermittent radicular leg symptoms, occasionally extends below the knee. On exam, positive facet loading and positive KATHY, sacral thrust and Yeoman's bilaterally. Positive myofascial TTP, negative SLR and neuro exam WNL. Etiology is likely associated with multiple factors, including underlying degenerative changes of lumbar spine, facet and SIJ mediated components, with prominent overlying myofascial component.   Neck/thoracic back pain - Onset of pain within last 2 years. On exam, positive for myofascial TTP, most notably in lower thoracic paraspinals. Etiology is likely associated with multiple factors, including underlying degenerative changes, with prominent overlying myofascial component.      Assigned to [place] nursing team.     Visit Diagnoses:  No diagnosis found.    Plan:  Diagnosis reviewed, treatment option addressed, and risk/benifits discussed.  Self-care instructions given.  I am recommending a multidisciplinary treatment plan to help this patient better manage their pain.    ***  Pain Physical Therapy:  Prior PT for low back/SI and neck pain. Continues home exercise program.      Pain Psychology: Previously worked with Karen Kohler PhD, last visit on 9/30/24.      Diagnostic Studies:  No new orders today.      Medication Management:   Nortriptyline - tried taking 10 mg at night, reports vivid dreams and hx of PTSD. Stopped taking after 4 nights, improvement with vivid dreams.   Diclofenac gel to both hips 3-4 x daily. Advised to use consistently and  may take 1-2 weeks to notice benefit. If prescription not covered by insurance, may purchase over the counter.   Duloxetine -  Current dose 60 mg twice daily. No reported side effects. Will discuss outcome with most recent dose adjustment at future follow up.   Muscle relaxants:   Continue baclofen 20 mg 3 times daily.  Reports enhanced effect with adding afternoon dose, mild daytime sedation effects are manageable. Recommended taking baclofen and buspirone at separate times.   Flexeril - 5-10 mg twice daily as needed. PCP managing. Recommend reducing use to once daily as needed if helpful on days with increased pain.   Allow 4-6 hours in between all muscle relaxant doses, do not mix with alcohol.   [place] - [place] at [place] on 1/3/24. No clear benefit for pain with daytime products, some benefit at night with products used at bedtime.       Potential procedures:   Bilateral SI joint injections recommended at last visit. Orders placed to schedule with Dr. Reynaga. Will having scheduling team   Bilateral cervical 3,4,5 medial branch nerve block #1 on 6/27/24 and MBB #2 on 10/24/24. Reports up to 80% pain relief for a few hours, pain returned to baseline by the next day after procedures. Orders placed to schedule RFA.   Will discuss higher cervical pain with Dr. Reynaga, possibly target C2 medial branch?         Other Therapies/Orders/Referrals:   TENS unit - continue use throughout day as needed.      Follow up with JEANNIE Schroeder CNP around 3-4 weeks after SI joint injections    Additional visit details: ***Message Jacoby about referral for second opinion***message to Dr. Stoner's team GI recommended abdominal wall injection     Review of Electronic Chart: Today I have also reviewed available medical information in the patient's medical record at [facility] ([facility]) and Care Everywhere (if available), including relevant provider notes, laboratory work, and  imaging.     Jenny Landrum, NAOMI, APRN, AGNP-C  [facility]     -------------------------------------------------    Subjective:    Chief complaint: No chief complaint on file.      Interval history:  Radha Beckwith is a 48 year old female last seen on 10/28/24.      Recommendations/plan at the last visit included:  Pain Physical Therapy:  Prior PT for low back/SI and neck pain. Continues home exercise program.      Pain Psychology: Previously worked with Karen Kohler PhD, last visit on 9/30/24.      Diagnostic Studies:  No new orders today.      Medication Management:   Nortriptyline - tried taking 10 mg at night, reports vivid dreams and hx of PTSD. Stopped taking after 4 nights, improvement with vivid dreams.   Diclofenac gel to both hips 3-4 x daily. Advised to use consistently and may take 1-2 weeks to notice benefit. If prescription not covered by insurance, may purchase over the counter.   Duloxetine -  Current dose 60 mg twice daily. No reported side effects. Will discuss outcome with most recent dose adjustment at future follow up.   Muscle relaxants:   Continue baclofen 20 mg 3 times daily.  Reports enhanced effect with adding afternoon dose, mild daytime sedation effects are manageable. Recommended taking baclofen and buspirone at separate times.   Flexeril - 5-10 mg twice daily as needed. PCP managing. Recommend reducing use to once daily as needed if helpful on days with increased pain.   Allow 4-6 hours in between all muscle relaxant doses, do not mix with alcohol.   [place] on 1/3/24. No clear benefit for pain with daytime products, some benefit at night with products used at bedtime.       Potential procedures:   Bilateral SI joint injections recommended at last visit. Orders placed to schedule with Dr. Reynaga. Will having scheduling team   Bilateral cervical 3,4,5 medial branch nerve block #1 on 6/27/24 and  MBB #2 on 10/24/24. Reports up to 80% pain relief for a few hours, pain returned to baseline by the next day after procedures. Orders placed to schedule RFA.   Will discuss higher cervical pain with Dr. Reynaga, possibly target C2 medial branch?         Other Therapies/Orders/Referrals:   TENS unit - continue use throughout day as needed.      Follow up with JEANNIE Schroeder CNP around 3-4 weeks after SI joint injections    Since her last visit, Radha MASSEY Amena reports:  -She reports abdominal pain is more severe now. Primarily in the epigastric and radiates bilaterally.   -She reports being advised that her abdominal pain is coming from the back.   -She reports midline low back pain that seems axial in nature.   -We had discussed SI joint at prior injections, she would like to try schedule with Dr. Reynaga.   -Dr. Dozier recently gave her prescription for oxycodone   -    Pain Information:   Pain rating: averages {PAIN SCALE:289114} on a 0-10 scale.      HPI/Interval Hx from last visit:  -She reports GT bursitis has not improved.   -Diclofenac gel recommended for GT bursitis, states she did not start using this on hips.   -She cannot tolerate PO NSAIDs due to GI upset.   -We discussed nortriptyline trial at last visit, states that she did not get started with this. She had vivid dreams and stopped use after 4 days. Dreams improved after she stopped use, states she continues to have PTSD related dreams but not as intense once off TCA.   -She had MBB #2 on 10/24/24. She reports up to 80% pain relief that lasted for a few hours. She reports similar results from MBB #1 in June, up to 80% relief for a few hours.   -We had discussed SI joint injections at last visit, but it does not appear she got scheduled yet. We will have schedulers contact her to set up appointment.       Current Pain Treatments:                Suboxone 2-0.5 mg daily (weaning off)              Ropinirole 1 mg at bedtime                Cyclobenzaprine 5-10 mg QD PRN                                Baclofen 20 mg TID              Duloxetine 60 mg BID                 TENS              PT - HEP              CMBB/RFA - orders placed for RFA today               Bilateral SI joint injections ordered at [facility], not scheduled yet        Current MME: N/A     Review of [place] Prescription Monitoring Program (): No concern for abuse or misuse of controlled medications based on this report. Reviewed - appears appropriate. ***     Past pain treatments:  SI joint injection 5/16/23 -significant benefit x1 month, then diminishing efficacy  KINDRA on 4/19/24 with Dr. Ivory - marginal benefit   Gabapentin   Nortriptyline 10-20 mg at bedtime  ***    Medications:  Current Outpatient Medications   Medication Sig Dispense Refill    acetaminophen (TYLENOL) 500 MG tablet Take 1,000 mg by mouth every 6 hours as needed for mild pain      ALPRAZolam (XANAX) 0.5 MG tablet       baclofen (LIORESAL) 20 MG tablet Take 1 tablet (20 mg) by mouth 3 times daily 90 tablet 1    bisacodyl (DULCOLAX) 5 MG EC tablet Take 1 tablet (5 mg) by mouth every other day Take every other day. If no bowel movement for 2 or more days, take 2 tablets of bisacodyl (10 mg) 30 tablet 2    buprenorphine HCl-naloxone HCl (SUBOXONE) 2-0.5 MG per film Place 0.5 Film under the tongue daily       busPIRone HCl (BUSPAR) 30 MG tablet 30 mg 3 times daily      CONSTULOSE 10 GM/15ML solution TAKE 15 MLS (10 G) BY MOUTH 3 TIMES DAILY AS NEEDED FOR CONSTIPATION (AS NEEDED FOR SEVERE CONSTIPAITON, NO BM FOR MORE THAN 3 DAYS AND FEELING CONSTIPATED) 300 mL 3    cyclobenzaprine (FLEXERIL) 5 MG tablet TAKE ONE TO TWO TABLETS BY MOUTH THREE TIMES A DAY AS NEEDED FOR MUSCLE SPASMS 90 tablet 5    diclofenac (VOLTAREN) 1 % topical gel Apply 4 g topically 4 times daily. 350 g 2    DULoxetine (CYMBALTA) 60 MG capsule Take 1 capsule (60 mg) by mouth 2 times daily. 180 capsule 0    famotidine (PEPCID) 40 MG tablet Take 1  "tablet (40 mg) by mouth 2 times daily 60 tablet 1    linaclotide (LINZESS) 72 MCG capsule Take 1 capsule (72 mcg) by mouth every morning (before breakfast). 30 capsule 11    medical cannabis (Patient's own supply) See Admin Instructions. [place] Medical Cann[place] Card- Purchases through [facility].   (The purpose of this order is to document that the patient reports taking medical cannabis.  This is not a prescription, and is not used to certify that the patient has a qualifying medical condition.)      ondansetron (ZOFRAN ODT) 4 MG ODT tab DISSOLVE ONE TABLET BY MOUTH EVERY 6 HOURS AS NEEDED FOR NAUSEA 20 tablet 1    oxyCODONE (ROXICODONE) 5 MG tablet Take 1 tablet (5 mg) by mouth every 6 hours as needed for pain. 12 tablet 0    pantoprazole (PROTONIX) 40 MG EC tablet TAKE ONE TABLET BY MOUTH TWICE A DAY WITH MEALS 60 tablet 5    rOPINIRole (REQUIP) 1 MG tablet TAKE ONE TABLET BY MOUTH EVERY NIGHT AT BEDTIME 90 tablet 0    simvastatin (ZOCOR) 10 MG tablet TAKE ONE TABLET BY MOUTH EVERY NIGHT AT BEDTIME 90 tablet 1    temazepam (RESTORIL) 15 MG capsule          Medical History: any changes in medical history since they were last seen? { :050594::\"No\"}      Objective:    Physical Exam:  not currently breastfeeding.  Constitutional: Well developed, well nourished, appears stated age.  Gait: {GAIT:143270}  HEENT: Head atraumatic, normocephalic. Eyes without conjunctival injection or jaundice. Oropharynx clear. Neck supple. No obvious neck masses.  Skin: No rash, lesions, or petechiae of exposed skin.   Psychiatric/mental status: Alert, without lethargy or stupor. Speech fluent. Appropriate affect. Mood normal. Able to follow commands without difficulty.     Diagnostic Tests/Imaging/Labs:  ***    BILLING TIME DOCUMENTATION:   The total TIME spent on this patient on the date of the encounter/appointment was *** minutes.      TOTAL TIME includes:   Time spent preparing to see the patient (reviewing records " and tests)   Time spent face to face (or over the phone) with the patient   Time spent ordering tests, medications, procedures and referrals   Time spent Referring and communicating with other healthcare professionals   Time spent documenting clinical information in Epic

## 2024-11-14 ENCOUNTER — OFFICE VISIT (OUTPATIENT)
Dept: PALLIATIVE MEDICINE | Facility: CLINIC | Age: 49
End: 2024-11-14
Payer: COMMERCIAL

## 2024-11-14 ENCOUNTER — PATIENT OUTREACH (OUTPATIENT)
Dept: CARE COORDINATION | Facility: CLINIC | Age: 49
End: 2024-11-14

## 2024-11-14 VITALS — HEART RATE: 108 BPM | DIASTOLIC BLOOD PRESSURE: 82 MMHG | OXYGEN SATURATION: 98 % | SYSTOLIC BLOOD PRESSURE: 114 MMHG

## 2024-11-14 DIAGNOSIS — M47.812 SPONDYLOSIS OF CERVICAL REGION WITHOUT MYELOPATHY OR RADICULOPATHY: ICD-10-CM

## 2024-11-14 DIAGNOSIS — M54.41 CHRONIC BILATERAL LOW BACK PAIN WITH BILATERAL SCIATICA: ICD-10-CM

## 2024-11-14 DIAGNOSIS — M79.2 NEUROPATHIC PAIN: ICD-10-CM

## 2024-11-14 DIAGNOSIS — M54.42 CHRONIC BILATERAL LOW BACK PAIN WITH BILATERAL SCIATICA: ICD-10-CM

## 2024-11-14 DIAGNOSIS — M54.2 CHRONIC NECK PAIN: ICD-10-CM

## 2024-11-14 DIAGNOSIS — M53.3 PAIN OF BOTH SACROILIAC JOINTS: ICD-10-CM

## 2024-11-14 DIAGNOSIS — M70.61 TROCHANTERIC BURSITIS OF BOTH HIPS: ICD-10-CM

## 2024-11-14 DIAGNOSIS — R10.9 ABDOMINAL WALL PAIN: ICD-10-CM

## 2024-11-14 DIAGNOSIS — R10.13 ABDOMINAL PAIN, EPIGASTRIC: ICD-10-CM

## 2024-11-14 DIAGNOSIS — G89.4 CHRONIC PAIN SYNDROME: Primary | ICD-10-CM

## 2024-11-14 DIAGNOSIS — G89.29 CHRONIC BILATERAL LOW BACK PAIN WITH BILATERAL SCIATICA: ICD-10-CM

## 2024-11-14 DIAGNOSIS — M70.62 TROCHANTERIC BURSITIS OF BOTH HIPS: ICD-10-CM

## 2024-11-14 DIAGNOSIS — M79.18 MYOFASCIAL PAIN: ICD-10-CM

## 2024-11-14 DIAGNOSIS — R10.13 ABDOMINAL PAIN, EPIGASTRIC: Primary | ICD-10-CM

## 2024-11-14 DIAGNOSIS — G89.29 CHRONIC NECK PAIN: ICD-10-CM

## 2024-11-14 ASSESSMENT — PAIN SCALES - PAIN ENJOYMENT GENERAL ACTIVITY SCALE (PEG)
INTERFERED_ENJOYMENT_LIFE: 10 - COMPLETELY INTERFERES
INTERFERED_ENJOYMENT_LIFE: 10
INTERFERED_GENERAL_ACTIVITY: 10
INTERFERED_GENERAL_ACTIVITY: 10 - COMPLETELY INTERFERES
PEG_TOTALSCORE: 9.33
AVG_PAIN_PASTWEEK: 8

## 2024-11-14 ASSESSMENT — PAIN SCALES - GENERAL: PAINLEVEL_OUTOF10: SEVERE PAIN (7)

## 2024-11-15 ENCOUNTER — TELEPHONE (OUTPATIENT)
Dept: FAMILY MEDICINE | Facility: CLINIC | Age: 49
End: 2024-11-15
Payer: COMMERCIAL

## 2024-11-15 ENCOUNTER — TELEPHONE (OUTPATIENT)
Dept: PALLIATIVE MEDICINE | Facility: CLINIC | Age: 49
End: 2024-11-15
Payer: COMMERCIAL

## 2024-11-15 ENCOUNTER — MYC MEDICAL ADVICE (OUTPATIENT)
Dept: PALLIATIVE MEDICINE | Facility: CLINIC | Age: 49
End: 2024-11-15

## 2024-11-15 DIAGNOSIS — M47.812 SPONDYLOSIS OF CERVICAL REGION WITHOUT MYELOPATHY OR RADICULOPATHY: Primary | ICD-10-CM

## 2024-11-15 DIAGNOSIS — R10.13 ABDOMINAL PAIN, EPIGASTRIC: Primary | ICD-10-CM

## 2024-11-15 RX ORDER — HYOSCYAMINE SULFATE 0.125 MG
0.12 TABLET ORAL EVERY 6 HOURS PRN
Qty: 60 TABLET | Refills: 1 | Status: SHIPPED | OUTPATIENT
Start: 2024-11-15

## 2024-11-15 NOTE — TELEPHONE ENCOUNTER
Could Provider please change  Hypocyamine SL tablets to Non SL ( Sublingual ) Insurance will not cover the sublingual      Thank you        Felicitas Clarkles  Pharmacy [facility].  [facility]  [phone]

## 2024-11-15 NOTE — TELEPHONE ENCOUNTER
[facility] Call Center    Phone Message    May a detailed message be left on voicemail: yes     Reason for Call: Other: Patient called stating Jenny Landrum ordered some medication that goes under the tongue but her insurance does not cover it, but it will cover the pill kind. If she could order the pill please.      Action Taken: Message routed to:  Other: [facility]     Travel Screening: Not Applicable     Date of Service:

## 2024-11-16 NOTE — TELEPHONE ENCOUNTER
New prescription sent to pharmacy for PO tabs.    Jenny Landrum, DNP, APRN, AGNP-C  [facility]

## 2024-11-16 NOTE — TELEPHONE ENCOUNTER
New prescription for PO tabs sent to pharmacy.     Jenny Landrum, NAOMI, APRN, AGNP-C  [facility]

## 2024-11-19 DIAGNOSIS — K59.03 CONSTIPATION DUE TO OPIOID THERAPY: ICD-10-CM

## 2024-11-19 DIAGNOSIS — T40.2X5A CONSTIPATION DUE TO OPIOID THERAPY: ICD-10-CM

## 2024-11-20 ENCOUNTER — VIRTUAL VISIT (OUTPATIENT)
Dept: PSYCHOLOGY | Facility: CLINIC | Age: 49
End: 2024-11-20
Payer: COMMERCIAL

## 2024-11-20 DIAGNOSIS — F33.1 MODERATE EPISODE OF RECURRENT MAJOR DEPRESSIVE DISORDER (H): Primary | ICD-10-CM

## 2024-11-20 DIAGNOSIS — F41.1 GENERALIZED ANXIETY DISORDER: ICD-10-CM

## 2024-11-20 PROCEDURE — 90834 PSYTX W PT 45 MINUTES: CPT | Mod: 95 | Performed by: COUNSELOR

## 2024-11-20 RX ORDER — LACTULOSE 10 G/15ML
10 SOLUTION ORAL 3 TIMES DAILY PRN
Qty: 300 ML | Refills: 3 | Status: SHIPPED | OUTPATIENT
Start: 2024-11-20

## 2024-11-20 RX ORDER — LACTULOSE 10 G/15ML
10 SOLUTION ORAL 3 TIMES DAILY PRN
Qty: 300 ML | Refills: 3 | Status: SHIPPED | OUTPATIENT
Start: 2024-11-20 | End: 2024-11-20

## 2024-11-20 NOTE — PROGRESS NOTES
Answers submitted by the patient for this visit:  Patient Health Questionnaire (Submitted on 11/20/2024)  If you checked off any problems, how difficult have these problems made it for you to do your work, take care of things at home, or get along with other people?: Extremely difficult  PHQ9 TOTAL SCORE: 23            [facility]                                     Progress Note    Patient Name: Radha Beckwith  Date: 11/20/24         Service Type: Individual      Session Start Time: 12:00pm Session End Time:  12:50pm     Session Length:  50    Session #: 60    Attendees: Client    Service Modality:  video      Provider verified identity through the following two step process.  Patient provided:  Patient is known previously to provider    Telemedicine Visit: The patient's condition can be safely assessed and treated via synchronous audio and visual telemedicine encounter.      Reason for Telemedicine Visit: Patient convenience (e.g. access to timely appointments / distance to available provider)    Originating Site (Patient Location): Patient's home    Distant Site (Provider Location): Provider Remote Setting- Home Office    Consent:  The patient/guardian has verbally consented to: the potential risks and benefits of telemedicine (video visit) versus in person care; bill my insurance or make self-payment for services provided; and responsibility for payment of non-covered services.     Patient would like the video invitation sent by:  My Chart    Mode of Communication:  video    Distant Location (Provider):  Off-site home office    As the provider I attest to compliance with applicable laws and regulations related to telemedicine.    DATA  Interactive Complexity: No  Crisis: No        Progress Since Last Session (Related to Symptoms / Goals / Homework):   Symptoms: No change .    Homework: Completed in session      Episode of Care Goals: Satisfactory progress - MAINTENANCE (Working to maintain  change, with risk of relapse); Intervened by continuing to positively reinforce healthy behavior choice      Current / Ongoing Stressors and Concerns:   Client stated she got two kittens.   Had a night when she cleared out a bunch of stuff and had an emotional time of it.        Treatment Objective(s) Addressed in This Session:   Increase interest, engagement, and pleasure in doing things  Feel less tired and more energy during the day        Intervention:   Processed through the recent emotional release the client experienced over the weekend. Discussed what it meant to her to purge some of the old things she had from her mom and other family members that she got rid of. Also talked more about her relationship and if she is closer to being willing to be more open with her boyfriend.     Assessments completed prior to visit:  The following assessments were completed by patient for this visit:  PHQ9:       10/3/2024     2:00 PM 10/7/2024    10:50 AM 10/14/2024     3:26 PM 10/27/2024     7:57 PM 11/4/2024    12:53 PM 11/10/2024     7:02 PM 11/20/2024    10:28 AM   PHQ-9 SCORE   PHQ-9 Total Score MyChart 22 (Severe depression) 24 (Severe depression) 23 (Severe depression) 23 (Severe depression) 22 (Severe depression) 21 (Severe depression) 23 (Severe depression)   PHQ-9 Total Score 22 24 23 23  22  21  23        Patient-reported     GAD7:       7/25/2024    11:52 AM 8/13/2024     5:35 PM 8/28/2024     2:06 PM 9/16/2024     3:27 PM 10/3/2024     1:58 PM 10/27/2024     7:59 PM 11/10/2024     7:03 PM   BO-7 SCORE   Total Score 21 (severe anxiety) 19 (severe anxiety) 21 (severe anxiety) 20 (severe anxiety) 20 (severe anxiety) 20 (severe anxiety) 19 (severe anxiety)   Total Score 21 19    19 21 20    20 20    20 20  19        Patient-reported    Multiple values from one day are sorted in reverse-chronological order     PROMIS 10-Global Health (all questions and answers displayed):       4/10/2024     1:58 PM 4/17/2024      1:28 PM 7/25/2024    11:56 AM 8/4/2024     7:20 PM 8/10/2024     2:51 PM 8/18/2024    11:36 AM 8/28/2024     2:07 PM   PROMIS 10   In general, would you say your health is: Poor Poor Poor Poor Fair Poor Poor   In general, would you say your quality of life is: Poor Poor Fair Poor Poor Fair Fair   In general, how would you rate your physical health? Poor Poor Poor Poor Poor Poor Fair   In general, how would you rate your mental health, including your mood and your ability to think? Poor Poor Poor Poor Poor Poor Poor   In general, how would you rate your satisfaction with your social activities and relationships? Poor Poor Poor Fair Poor Fair Fair   In general, please rate how well you carry out your usual social activities and roles Fair Poor Poor Poor Poor Fair Fair   To what extent are you able to carry out your everyday physical activities such as walking, climbing stairs, carrying groceries, or moving a chair? A little A little A little Moderately A little A little A little   In the past 7 days, how often have you been bothered by emotional problems such as feeling anxious, depressed, or irritable? Always Always Always Always Always Always Always   In the past 7 days, how would you rate your fatigue on average? Severe Very severe Severe Severe Very severe Very severe Severe   In the past 7 days, how would you rate your pain on average, where 0 means no pain, and 10 means worst imaginable pain? 7 7 7 7 7 7 7   In general, would you say your health is: 1  1  1  1  2  1  1    In general, would you say your quality of life is: 1  1  2  1  1  2  2    In general, how would you rate your physical health? 1  1  1  1  1  1  2    In general, how would you rate your mental health, including your mood and your ability to think? 1  1  1  1  1  1  1    In general, how would you rate your satisfaction with your social activities and relationships? 1  1  1  2  1  2  2    In general, please rate how well you carry out your usual  social activities and roles. (This includes activities at home, at work and in your community, and responsibilities as a parent, child, spouse, employee, friend, etc.) 2  1  1  1  1  2  2    To what extent are you able to carry out your everyday physical activities such as walking, climbing stairs, carrying groceries, or moving a chair? 2  2  2  3  2  2  2    In the past 7 days, how often have you been bothered by emotional problems such as feeling anxious, depressed, or irritable? 5  5  5  5  5  5  5    In the past 7 days, how would you rate your fatigue on average? 4  5  4  4  5  5  4    In the past 7 days, how would you rate your pain on average, where 0 means no pain, and 10 means worst imaginable pain? 7  7  7  7  7  7  7    Global Mental Health Score 4    4 4    4 5    5 5 4    4 6 6   Global Physical Health Score 7    7 6    6 7    7 8 6    6 6 8   PROMIS TOTAL - SUBSCORES 11    11 10    10 12    12 13 10    10 12 14       Patient-reported    Multiple values from one day are sorted in reverse-chronological order     [place] Suicide Severity Rating Scale (Lifetime/Recent)      5/13/2024     3:13 PM 8/21/2024    10:30 AM 8/30/2024     8:20 AM 8/31/2024    12:07 PM 9/5/2024     5:33 PM 9/13/2024    10:52 AM 10/4/2024     4:54 PM   [place] Suicide Severity Rating (Lifetime/Recent)   Q1 Wished to be Dead (Past Month) 0-->no 0-->no 0-->no 0-->no 0-->no 0-->no 0-->no   Q2 Suicidal Thoughts (Past Month) 0-->no 0-->no 0-->no 0-->no 0-->no 0-->no 0-->no   Q6 Suicide Behavior (Lifetime) 0-->no 0-->no 0-->no 0-->no 1-->yes 0-->no 0-->no   If yes to Q6, within past 3 months?     0-->no     Level of Risk per Screen no risks indicated no risks indicated no risks indicated no risks indicated moderate risk no risks indicated no risks indicated         ASSESSMENT: Current Emotional / Mental Status (status of significant symptoms):   Risk status (Self / Other harm or suicidal ideation)   Patient denies current fears or  concerns for personal safety.   Patient denies current or recent suicidal ideation or behaviors.   Patient denies current or recent homicidal ideation or behaviors.   Patient denies current or recent self injurious behavior or ideation.   Patient denies other safety concerns.   Patient reports there has been no change in risk factors since their last session.     Patient reports there has been no change in protective factors since their last session.     Recommended that patient call 911 or go to the local ED should there be a change in any of these risk factors.     Appearance:   Appropriate    Eye Contact:   Good    Psychomotor Behavior: Normal    Attitude:   Cooperative    Orientation:   All   Speech    Rate / Production: Normal/ Responsive Normal     Volume:  Normal    Mood:    Normal   Affect:    Appropriate    Thought Content:  Clear    Thought Form:  Coherent    Insight:    Good      Medication Review:   No changes to current psychiatric medication(s)     Medication Compliance:   Yes     Changes in Health Issues:   None reported     Chemical Use Review:   Substance Use: Chemical use reviewed, no active concerns identified      Tobacco Use: No change in amount of tobacco use since last session.  Patient declined discussion at this time    Diagnosis:  1. Moderate episode of recurrent major depressive disorder (H)    2. Generalized anxiety disorder            Collateral Reports Completed:   Not Applicable    PLAN: (Patient Tasks / Therapist Tasks / Other)  Continue to utilize stress reduction skills daily.           Ricarda Bhatia [facility]                                                         ______________________________________________________________________    Individual Treatment Plan    Patient's Name: Radha Beckwith  YOB: 1975    Date of Creation: 6/28/23  Date Treatment Plan Last Reviewed/Revised: 10/14/24    DSM5 Diagnoses: 296.32 (F33.1) Major Depressive Disorder, Recurrent  Episode, Moderate _  Psychosocial / Contextual Factors: living with boyfriend, memory issues  PROMIS (reviewed every 90 days):     Referral / Collaboration:  Referral to another professional/service is not indicated at this time..    Anticipated number of session for this episode of care: 9-12 sessions  Anticipation frequency of session:  as needed  Anticipated Duration of each session: 38-52 minutes  Treatment plan will be reviewed in 90 days or when goals have been changed.       MeasurableTreatment Goal(s) related to diagnosis / functional impairment(s)  Goal 1: Patient will increase communication skills with family members.    Objective #A (Patient Action)    Patient will learn & utilize at least 2 assertive communication skills weekly.  Status: Continued - Date(s): 10/14/24    Intervention(s)  Therapist will teach emotional regulation skills. distress tolerance skills, interpersonal effectiveness skills, emotion regluation skills, mindfulness skills, radical acceptance. Therapist will teach client how to ID body cues for anxiety, anxiety reduction techniques, how to ID triggers for depression and anxiety- decrease reactivity/eliminate, lifestyle changes to reduce depression and anxiety, communication skills, explore cognitive beliefs and help client to develop healthy cognitive patterns and beliefs.    Objective #B  Patient will use thought-stopping strategy daily to reduce intrusive thoughts.  Status: Continued - Date(s): 10/14/24    Intervention(s)  Therapist will teach emotional regulation skills. distress tolerance skills, interpersonal effectiveness skills, emotion regluation skills, mindfulness skills, radical acceptance. Therapist will teach client how to ID body cues for anxiety, anxiety reduction techniques, how to ID triggers for depression and anxiety- decrease reactivity/eliminate, lifestyle changes to reduce depression and anxiety, communication skills, explore cognitive beliefs and help client to  develop healthy cognitive patterns and beliefs.      Goal 2: Patient will decrease depression symptoms.      Objective #A (Patient Action)    Status: Continued - Date(s): 10/14/24    Patient will Increase interest, engagement, and pleasure in doing things  Decrease frequency and intensity of feeling down, depressed, hopeless  Improve quantity and quality of night time sleep / decrease daytime naps  Feel less tired and more energy during the day   Improve diet, appetite, mindful eating, and / or meal planning  Identify negative self-talk and behaviors: challenge core beliefs, myths, and actions  Improve concentration, focus, and mindfulness in daily activities   Feel less fidgety, restless or slow in daily activities / interpersonal interactions.    Intervention(s)  Therapist will teach emotional regulation skills. distress tolerance skills, interpersonal effectiveness skills, emotion regluation skills, mindfulness skills, radical acceptance. Therapist will teach client how to ID body cues for anxiety, anxiety reduction techniques, how to ID triggers for depression and anxiety- decrease reactivity/eliminate, lifestyle changes to reduce depression and anxiety, communication skills, explore cognitive beliefs and help client to develop healthy cognitive patterns and beliefs.    Objective #B  Patient will identify three distraction and diversion activities and use those activities to decrease level of anxiety  .    Status: Continued - Date(s):  10/14/24    Intervention(s)  Therapist will teach emotional regulation skills. distress tolerance skills, interpersonal effectiveness skills, emotion regluation skills, mindfulness skills, radical acceptance. Therapist will teach client how to ID body cues for anxiety, anxiety reduction techniques, how to ID triggers for depression and anxiety- decrease reactivity/eliminate, lifestyle changes to reduce depression and anxiety, communication skills, explore cognitive beliefs and  help client to develop healthy cognitive patterns and beliefs.      Patient has reviewed and agreed to the above plan.      ERIK Keller

## 2024-11-21 ENCOUNTER — LAB (OUTPATIENT)
Dept: LAB | Facility: CLINIC | Age: 49
End: 2024-11-21
Payer: COMMERCIAL

## 2024-11-21 DIAGNOSIS — F11.21 OPIOID DEPENDENCE IN REMISSION (H): ICD-10-CM

## 2024-11-21 LAB
AMPHETAMINES UR QL SCN: ABNORMAL
BARBITURATES UR QL SCN: ABNORMAL
BENZODIAZ UR QL SCN: ABNORMAL
BZE UR QL SCN: ABNORMAL
CANNABINOIDS UR QL SCN: ABNORMAL
FENTANYL UR QL: ABNORMAL
FENTANYL UR QL: NORMAL
OPIATES UR QL SCN: ABNORMAL
PCP QUAL URINE (ROCHE): ABNORMAL

## 2024-11-21 NOTE — PATIENT INSTRUCTIONS
Bilateral sacroiliac joint injections recommended, new procedure order placed today. You will be contacted to schedule with Dr. Reynaga.   Abdominal wall trigger point injections recommended by Dr. Dozier. Discussed plan to schedule at [facility] ([address] [place] [address]) for sooner availability.   Will message Dr. Dozier about referral for second opinion and will reach out to you with update once we have more information.   Physical therapy referral to [facility] placed today. Scheduling number is [phone].

## 2024-11-22 ENCOUNTER — HOSPITAL ENCOUNTER (EMERGENCY)
Facility: CLINIC | Age: 49
Discharge: HOME | End: 2024-11-22
Attending: NURSE PRACTITIONER | Admitting: NURSE PRACTITIONER
Payer: COMMERCIAL

## 2024-11-22 VITALS
WEIGHT: 161 LBS | TEMPERATURE: 98 F | DIASTOLIC BLOOD PRESSURE: 71 MMHG | HEART RATE: 109 BPM | RESPIRATION RATE: 18 BRPM | BODY MASS INDEX: 27.64 KG/M2 | SYSTOLIC BLOOD PRESSURE: 115 MMHG | OXYGEN SATURATION: 93 %

## 2024-11-22 DIAGNOSIS — K52.9 NON-SPECIFIC COLITIS: ICD-10-CM

## 2024-11-22 DIAGNOSIS — G89.4 CHRONIC PAIN SYNDROME: ICD-10-CM

## 2024-11-22 LAB
ALBUMIN SERPL BCG-MCNC: 4.6 G/DL (ref 3.5–5.2)
ALP SERPL-CCNC: 81 U/L (ref 40–150)
ALT SERPL W P-5'-P-CCNC: 18 U/L (ref 0–50)
ANION GAP SERPL CALCULATED.3IONS-SCNC: 12 MMOL/L (ref 7–15)
AST SERPL W P-5'-P-CCNC: 24 U/L (ref 0–45)
BASOPHILS # BLD AUTO: 0.1 10E3/UL (ref 0–0.2)
BASOPHILS NFR BLD AUTO: 1 %
BILIRUB SERPL-MCNC: 0.3 MG/DL
BUN SERPL-MCNC: 8.5 MG/DL (ref 6–20)
CALCIUM SERPL-MCNC: 10 MG/DL (ref 8.8–10.4)
CHLORIDE SERPL-SCNC: 100 MMOL/L (ref 98–107)
CREAT SERPL-MCNC: 0.72 MG/DL (ref 0.51–0.95)
EGFRCR SERPLBLD CKD-EPI 2021: >90 ML/MIN/1.73M2
EOSINOPHIL # BLD AUTO: 0.1 10E3/UL (ref 0–0.7)
EOSINOPHIL NFR BLD AUTO: 1 %
ERYTHROCYTE [DISTWIDTH] IN BLOOD BY AUTOMATED COUNT: 13.2 % (ref 10–15)
GLUCOSE SERPL-MCNC: 108 MG/DL (ref 70–99)
HCO3 SERPL-SCNC: 26 MMOL/L (ref 22–29)
HCT VFR BLD AUTO: 42.4 % (ref 35–47)
HGB BLD-MCNC: 14.2 G/DL (ref 11.7–15.7)
IMM GRANULOCYTES # BLD: 0 10E3/UL
IMM GRANULOCYTES NFR BLD: 1 %
LYMPHOCYTES # BLD AUTO: 1.7 10E3/UL (ref 0.8–5.3)
LYMPHOCYTES NFR BLD AUTO: 27 %
MCH RBC QN AUTO: 30 PG (ref 26.5–33)
MCHC RBC AUTO-ENTMCNC: 33.5 G/DL (ref 31.5–36.5)
MCV RBC AUTO: 90 FL (ref 78–100)
MONOCYTES # BLD AUTO: 0.4 10E3/UL (ref 0–1.3)
MONOCYTES NFR BLD AUTO: 7 %
NEUTROPHILS # BLD AUTO: 4 10E3/UL (ref 1.6–8.3)
NEUTROPHILS NFR BLD AUTO: 64 %
NRBC # BLD AUTO: 0 10E3/UL
NRBC BLD AUTO-RTO: 0 /100
PLATELET # BLD AUTO: 196 10E3/UL (ref 150–450)
POTASSIUM SERPL-SCNC: 4.5 MMOL/L (ref 3.4–5.3)
PROT SERPL-MCNC: 7.6 G/DL (ref 6.4–8.3)
RBC # BLD AUTO: 4.74 10E6/UL (ref 3.8–5.2)
SODIUM SERPL-SCNC: 138 MMOL/L (ref 135–145)
WBC # BLD AUTO: 6.2 10E3/UL (ref 4–11)

## 2024-11-22 PROCEDURE — 99284 EMERGENCY DEPT VISIT MOD MDM: CPT | Performed by: NURSE PRACTITIONER

## 2024-11-22 PROCEDURE — 85048 AUTOMATED LEUKOCYTE COUNT: CPT | Performed by: NURSE PRACTITIONER

## 2024-11-22 PROCEDURE — 96375 TX/PRO/DX INJ NEW DRUG ADDON: CPT | Performed by: NURSE PRACTITIONER

## 2024-11-22 PROCEDURE — 80053 COMPREHEN METABOLIC PANEL: CPT | Performed by: NURSE PRACTITIONER

## 2024-11-22 PROCEDURE — 99284 EMERGENCY DEPT VISIT MOD MDM: CPT | Mod: 25 | Performed by: NURSE PRACTITIONER

## 2024-11-22 PROCEDURE — 36415 COLL VENOUS BLD VENIPUNCTURE: CPT | Performed by: NURSE PRACTITIONER

## 2024-11-22 PROCEDURE — 250N000011 HC RX IP 250 OP 636: Performed by: NURSE PRACTITIONER

## 2024-11-22 PROCEDURE — 96374 THER/PROPH/DIAG INJ IV PUSH: CPT | Performed by: NURSE PRACTITIONER

## 2024-11-22 PROCEDURE — 85004 AUTOMATED DIFF WBC COUNT: CPT | Performed by: NURSE PRACTITIONER

## 2024-11-22 RX ORDER — ONDANSETRON 2 MG/ML
4 INJECTION INTRAMUSCULAR; INTRAVENOUS ONCE
Status: COMPLETED | OUTPATIENT
Start: 2024-11-22 | End: 2024-11-22

## 2024-11-22 RX ORDER — KETOROLAC TROMETHAMINE 15 MG/ML
15 INJECTION, SOLUTION INTRAMUSCULAR; INTRAVENOUS ONCE
Status: COMPLETED | OUTPATIENT
Start: 2024-11-22 | End: 2024-11-22

## 2024-11-22 RX ADMIN — KETOROLAC TROMETHAMINE 15 MG: 15 INJECTION, SOLUTION INTRAMUSCULAR; INTRAVENOUS at 12:45

## 2024-11-22 RX ADMIN — HYDROMORPHONE HYDROCHLORIDE 1 MG: 1 INJECTION, SOLUTION INTRAMUSCULAR; INTRAVENOUS; SUBCUTANEOUS at 12:46

## 2024-11-22 RX ADMIN — ONDANSETRON 4 MG: 2 INJECTION, SOLUTION INTRAMUSCULAR; INTRAVENOUS at 12:42

## 2024-11-22 ASSESSMENT — ACTIVITIES OF DAILY LIVING (ADL)
ADLS_ACUITY_SCORE: 0
ADLS_ACUITY_SCORE: 0

## 2024-11-22 ASSESSMENT — COLUMBIA-SUICIDE SEVERITY RATING SCALE - C-SSRS
1. IN THE PAST MONTH, HAVE YOU WISHED YOU WERE DEAD OR WISHED YOU COULD GO TO SLEEP AND NOT WAKE UP?: NO
2. HAVE YOU ACTUALLY HAD ANY THOUGHTS OF KILLING YOURSELF IN THE PAST MONTH?: NO
6. HAVE YOU EVER DONE ANYTHING, STARTED TO DO ANYTHING, OR PREPARED TO DO ANYTHING TO END YOUR LIFE?: NO

## 2024-11-22 NOTE — ED NOTES
Patient sent home with stool collection kit. Discharge reviewed and she states her  is waiting in the parking lot as she isn't able to ambulate easily.    Pt ambulatory out of the ER with her .

## 2024-11-22 NOTE — ED PROVIDER NOTES
History     Chief Complaint   Patient presents with    Abdominal Pain     HPI  Radha Beckwith is a 49 year old female with history of chronic pain syndrome (follows with pain clinic), depression, SVT, opioid dependence in remission (on Suboxone), anxiety, tobacco use, GERD, and restless leg syndrome who presents for evaluation of abdominal pain.  Patient is s/p ventral hernia repair at the end of August with Dr Hart.  She has continued to have chronic pain in her upper abdomen, but points to multiple areas of her abdomen intermittently painful.  This is patient's 5th ED visit for ongoing abdominal pains. She has been having constipation and diarrhea.   She is also following with pain management for her chronic pain syndrome.    She had follow-up with her surgeon, Dr. Dozier, on 11/11/2024.  I have reviewed the notes from that visit. She has had abdominal CT scan 10/4/2024 that showed no acute findings. However, due to her persistent pain she had repeat CT scan yesterday.  This showed findings suggestive of pancolitis with backwash ileitis.  She was contacted by her surgeon to present to the emergency department for further evaluation and workup and determine if antibiotics are indicated.   Patient last took her Suboxone last night. She has been cutting the film in half and would like to eventually get off Suboxone and wants to be back on Methadone.      CT ABDOMEN PELVIS WITH CONTRAST 11/21/2024 3:02 PM     CLINICAL HISTORY: Patient is status post incisional hernia repair with  continued pain at hernia site as well as upper abdomen. Postop pain.  Status post hernia repair.      TECHNIQUE: CT scan of the abdomen and pelvis was performed following  injection of IV contrast. Multiplanar reformats were obtained. Dose  reduction techniques were used.  CONTRAST: Isovue-370, 79 mL     COMPARISON: Multiple priors with the most recent dated 10/4/2024     FINDINGS:   LOWER CHEST: Minimal basilar atelectasis.      HEPATOBILIARY: No focal hepatic mass or biliary dilatation. Prior  cholecystectomy.     PANCREAS: No peripancreatic inflammation, pancreatic ductal  dilatation, or a solid pancreatic mass.     SPLEEN: Normal size. Calcified granulomas.     ADRENAL GLANDS: No nodules.     KIDNEYS/BLADDER: No suspicious renal mass or hydronephrosis. Minimal  cortical scarring in the right inferior renal pole. No abnormal focal  urinary bladder wall thickening.     BOWEL: Unremarkable appearance of the visualized portions of the  distal esophagus and stomach. Moderate-sized periampullary duodenal  diverticulum. No dilated loops of small bowel are present to suggest  an obstruction. There is circumferential wall thickening of the  terminal ileum over a span of approximately 2.5 cm extending to the  level of the ileocecal valve (series 4, image 38). There is wall  thickening and submucosal hyperenhancement involving the entirety of  the colon and rectum. Additionally, there are several segmental areas  demonstrating luminal narrowing, which appears similar to the prior  examination. These are present in the distal ascending colon (series  4, image 35) and in the proximal transverse colon (series 4, image  24). There are additional similar areas of narrowing, which are new  from prior.     PELVIC ORGANS: Prior hysterectomy. No large pelvic mass.     ADDITIONAL FINDINGS: Normal caliber abdominal aorta. Patent portal,  splenic, and mesenteric veins. Patent bilateral renal veins. No  abdominal or pelvic lymphadenopathy is identified by size criteria. No  ascites.     MUSCULOSKELETAL: Stable appearance of the postoperative changes along  the anterior superior midline abdominal wall. The associated  inflammatory changes have decreased. Unchanged associated areas of fat  necrosis. No new fluid collection. No aggressive osseous lesion.                                                                      IMPRESSION:   1.  Stable appearance of  the postoperative changes along the anterior  superior midline abdominal wall.  2.  Findings suggestive of pancolitis with backwash ileitis. Correlate  with clinical signs/symptoms.   3.  Several segmental areas of colonic luminal narrowing, which  appears similar to the prior examination. Findings are favored to be  due to underlying peristalsis. However, developing strictures would be  difficult to exclude.     [Access Center: Pancolitis with ileitis]     This report will be copied to the [facility] to ensure a  provider acknowledges the finding. Access Center is available Monday  through Friday 8am-3:30 pm.      MARIETTA LOPEZ MD      Notes from pain and palliative visit 11/14/2024:    Plan:  Diagnosis reviewed, treatment option addressed, and risk/benifits discussed.  Self-care instructions given.  I am recommending a multidisciplinary treatment plan to help this patient better manage their pain.       Pain Physical Therapy:  Prior PT for low back/SI and neck pain. Continues home exercise program.      Pain Psychology: Previously worked with Karen Kohler PhD, last visit on 9/30/24.      Diagnostic Studies:  No new orders today.      Medication Management:   Trial hyoscyamine 0.125 mg twice daily as needed for abdominal pain. Cautioned about potential CNS and anticholinergic effects. Pending response, may consider dicyclomine alternatively in between visits if needed.   Duloxetine -  Current dose 60 mg twice daily. No reported side effects. Will discuss outcome with last dose adjustment at future follow up.   Muscle relaxants:   Continue baclofen 20 mg 3 times daily.  Reports enhanced effect with adding afternoon dose, mild daytime sedation effects are manageable. Recommended taking baclofen and buspirone at separate times.   Flexeril - 5-10 mg twice daily as needed. PCP managing. Recommend reducing use to once daily as needed if helpful on days with increased pain.   Allow 4-6 hours in  between all muscle relaxant doses, do not mix with alcohol.   Medical cannabis - certified for [place] medical cannabis program at last visit on 1/3/24. No clear benefit for pain with daytime products, some benefit at night with products used at bedtime.       Potential procedures:   Bilateral SI joint injections recommended at prior visit and again today. Orders placed again today to schedule with Dr. Reynaga.   Bilateral cervical 3,4,5 medial branch nerve RFA scheduled on 12/17/24.   Abdominal wall trigger point injections recommended by Dr. Dozier (recent OV on 11/11). Case discussed with romario Edgar to double book. Order placed for scheduling.      Other Therapies/Orders/Referrals:   TENS unit - continue use throughout day as needed.      Follow up with JEANNIE Schroeder CNP around 3-4 weeks after SI joint injections    Allergies:  Allergies   Allergen Reactions    Compazine Anxiety and Palpitations     Severe anxiety attack    Droperidol Anxiety and Palpitations     Severe anxiety attack    Nubain [Nalbuphine Hcl] Anxiety and Palpitations     Severe anxiety attack    Ergotamine-Caffeine      12-            GI problems-    Seasonal Allergies     Sumatriptan      vomits after giving herself a shot    Prochlorperazine Anxiety and Palpitations     Uncontrolled movement, severe anxiety attack       Problem List:    Patient Active Problem List    Diagnosis Date Noted    Cervicalgia 04/02/2024     Priority: Medium    Cervical radiculopathy 02/14/2024     Priority: Medium    Chronic bilateral thoracic back pain 02/14/2024     Priority: Medium    Lumbar radiculopathy 02/14/2024     Priority: Medium    Pain of both sacroiliac joints 02/14/2024     Priority: Medium    Radicular pain of upper extremity 02/14/2024     Priority: Medium    Somatic dysfunction of pelvic region 01/10/2024     Priority: Medium    Myofascial pain 01/10/2024     Priority: Medium    Generalized anxiety disorder 11/27/2023     Priority:  Medium    Chronic low back pain with bilateral sciatica 06/15/2023     Priority: Medium    Chronic neck pain 06/15/2023     Priority: Medium    Moderate episode of recurrent major depressive disorder (H) 06/29/2022     Priority: Medium    Supraventricular tachycardia (H) 06/03/2019     Priority: Medium    Psychophysiological insomnia 12/30/2017     Priority: Medium    Chronic bilateral low back pain without sciatica 12/30/2017     Priority: Medium    Opioid dependence in remission (H) 08/02/2015     Priority: Medium     On suboxone treatement with Bryant Harkins.  (Noted in 2015).        Anxiety attack 07/31/2015     Priority: Medium    Hypokalemia 06/23/2015     Priority: Medium    Tobacco abuse 06/23/2015     Priority: Medium    Hyperlipidemia LDL goal <100 01/29/2014     Priority: Medium    GERD (gastroesophageal reflux disease) 07/28/2010     Priority: Medium     Takes omeprazole and metoclopramide      Restless legs 07/28/2010     Priority: Medium        Past Medical History:    Past Medical History:   Diagnosis Date    Abdominal pain, right lower quadrant 03/09/2008    Anxiety attack 07/31/2015    Atypical chest pain 06/23/2015    De Quervain's disease (tenosynovitis)     Dehydration     Depressive disorder 1996    Gastric ulcer 07/31/2015    GERD (gastroesophageal reflux disease) 07/28/2010    Hypertension 2017    Ingrowing nail 01/09/2014    Migraines     Opioid dependence in remission (H) 08/02/2015    Other and unspecified ovarian cyst     Papanicolaou smear of cervix with low grade squamous intraepithelial lesion (LGSIL) 07/07/2017       Past Surgical History:    Past Surgical History:   Procedure Laterality Date    BIOPSY CERVICAL, LOCAL EXCISION, SINGLE/MULTIPLE N/A 8/10/2017    Procedure: BIOPSY CERVICAL, LOCAL EXCISION, SINGLE/MULTIPLE;;  Surgeon: Michael Chandler MD;  [facility]    COLONOSCOPY N/A 1/6/2023    Procedure: COLONOSCOPY;  Surgeon: Michael Dozier MD;   [facility]    COLPOSCOPY, BIOPSY, COMBINED N/A 8/10/2017    Procedure: COMBINED COLPOSCOPY, BIOPSY;  Colposcopy with Cervical Biopsies and Endometrial Biopsy, Exam with Ultrasound;  Surgeon: Michael Chandler MD;  [facility]    ESOPHAGOSCOPY, GASTROSCOPY, DUODENOSCOPY (EGD), COMBINED N/A 4/17/2017    Procedure: COMBINED ESOPHAGOSCOPY, GASTROSCOPY, DUODENOSCOPY (EGD);  Surgeon: Ibrahima Esposito MD;  [facility]    ESOPHAGOSCOPY, GASTROSCOPY, DUODENOSCOPY (EGD), COMBINED N/A 1/6/2023    Procedure: ESOPHAGOGASTRODUODENOSCOPY, WITH BIOPSY;  Surgeon: Michael Dozier MD;  [facility]    ESOPHAGOSCOPY, GASTROSCOPY, DUODENOSCOPY (EGD), COMBINED N/A 10/3/2023    Procedure: ESOPHAGOGASTRODUODENOSCOPY, WITH BIOPSY;  Surgeon: John Paul Varela MD;  [facility]    EXAM UNDER ANESTHESIA PELVIC N/A 8/10/2017    Procedure: EXAM UNDER ANESTHESIA PELVIC;;  Surgeon: Michael Chandler MD;  [facility]    HERNIORRHAPHY, INCISIONAL, ROBOT-ASSISTED, LAPAROSCOPIC, USING DA JERRELL XI N/A 8/30/2024    Procedure: HERNIORRHAPHY, INCISIONAL, ROBOT-ASSISTED, LAPAROSCOPIC, USING DA JERRELL XI;  Surgeon: Michael Dozier MD;  [facility]    HYSTERECTOMY      HYSTERECTOMY, PAP NO LONGER INDICATED      INJECT EPIDURAL CERVICAL N/A 10/20/2023    Procedure: Cervical 6-7 Interlaminar epidural steroid injection using fluoroscopic guidance with contrast dye.;  Surgeon: Trevor Ivory MD;  [facility]    INJECT EPIDURAL CERVICAL N/A 4/19/2024    Procedure: Cervical 6 - Cervical 7 Interlaminar epidural steroid injection using fluoroscopic guidance with contrast dye.;  Surgeon: Trevor Ivory MD;  [facility]    INJECT EPIDURAL LUMBAR Bilateral 7/1/2022    Procedure: Lumbar 5-Sacral 1 Transforaminal Epidural Steroid Injection with fluoroscopic guidance and contrast, bilateral;  Surgeon: Trevor Ivory MD;  [facility]    LAPAROSCOPIC CHOLECYSTECTOMY N/A 2/2/2018    Procedure: LAPAROSCOPIC  CHOLECYSTECTOMY;  Laparoscopic Cholecystectomy;  Surgeon: Tigre Lowry DO;  [facility]    LAPAROSCOPIC HYSTERECTOMY TOTAL N/A 10/30/2017    Procedure: LAPAROSCOPIC HYSTERECTOMY TOTAL;  LAPAROSCOPIC HYSTERECTOMY TOTAL POSSIBLE SALPINGO-OOPHERECTOMY (BILATERAL);  Surgeon: Michael Chandler MD;  Location:  OR    [facility] UGI ENDOSCOPY, SIMPLE EXAM  08       Family History:    Family History   Problem Relation Age of Onset    Depression Mother     Respiratory Mother     Chronic Obstructive Pulmonary Disease Mother     Hypertension Mother     Anxiety Disorder Mother     Cerebrovascular Disease Father         First stroke at age 28,  from 2nd when he was 55. Brain aneurysms.    Breast Cancer Cousin     Adrenal Disorder Other     Chronic Obstructive Pulmonary Disease Other     Asthma Brother        Social History:  Marital Status:  Single [1]  Social History     Tobacco Use    Smoking status: Every Day     Current packs/day: 0.25     Average packs/day: 0.3 packs/day for 20.0 years (5.0 ttl pk-yrs)     Types: Cigarettes     Passive exposure: Never    Smokeless tobacco: Never   Vaping Use    Vaping status: Former    Substances: Nicotine, CBD    Devices: [employer]   Substance Use Topics    Alcohol use: Yes     Comment: Occasionaly    Drug use: No        Medications:    acetaminophen (TYLENOL) 500 MG tablet  ALPRAZolam (XANAX) 0.5 MG tablet  baclofen (LIORESAL) 20 MG tablet  bisacodyl (DULCOLAX) 5 MG EC tablet  buprenorphine HCl-naloxone HCl (SUBOXONE) 2-0.5 MG per film  busPIRone HCl (BUSPAR) 30 MG tablet  cyclobenzaprine (FLEXERIL) 5 MG tablet  diclofenac (VOLTAREN) 1 % topical gel  DULoxetine (CYMBALTA) 60 MG capsule  famotidine (PEPCID) 40 MG tablet  hyoscyamine (LEVSIN) 0.125 MG tablet  hyoscyamine (LEVSIN/SL) 0.125 MG sublingual tablet  lactulose (CONSTULOSE) 10 GM/15ML solution  linaclotide (LINZESS) 72 MCG capsule  medical cannabis (Patient's own supply)  ondansetron (ZOFRAN ODT)  4 MG ODT tab  pantoprazole (PROTONIX) 40 MG EC tablet  rOPINIRole (REQUIP) 1 MG tablet  simvastatin (ZOCOR) 10 MG tablet  temazepam (RESTORIL) 15 MG capsule          Review of Systems  As mentioned above in the history present illness. All other systems were reviewed and are negative.    Physical Exam   BP: (!) 148/100  Pulse: 109  Temp: 98  F (36.7  C)  Resp: 18  Weight: 73 kg (161 lb)  SpO2: 98 %      Physical Exam  Constitutional:       General: She is not in acute distress.     Appearance: Normal appearance. She is well-developed. She is not ill-appearing.   HENT:      Head: Normocephalic and atraumatic.      Right Ear: External ear normal.      Left Ear: External ear normal.      Nose: Nose normal.      Mouth/Throat:      Mouth: Mucous membranes are moist.   Eyes:      Conjunctiva/sclera: Conjunctivae normal.   Cardiovascular:      Rate and Rhythm: Normal rate and regular rhythm.      Heart sounds: Normal heart sounds. No murmur heard.  Pulmonary:      Effort: Pulmonary effort is normal. No respiratory distress.      Breath sounds: Normal breath sounds.   Abdominal:      General: Bowel sounds are normal. There is no distension.      Palpations: Abdomen is soft.      Tenderness: There is generalized abdominal tenderness.   Musculoskeletal:         General: Normal range of motion.   Skin:     General: Skin is warm and dry.      Findings: No rash.   Neurological:      General: No focal deficit present.      Mental Status: She is alert and oriented to person, place, and time.         ED Course        Procedures              Results for orders placed or performed during the hospital encounter of 11/22/24 (from the past 24 hours)   CBC with platelets differential    Narrative    The following orders were created for panel order CBC with platelets differential.  Procedure                               Abnormality         Status                     ---------                               -----------         ------                      CBC with platelets and d...[[id number]]                      Final result                 Please view results for these tests on the individual orders.   Comprehensive metabolic panel   Result Value Ref Range    Sodium 138 135 - 145 mmol/L    Potassium 4.5 3.4 - 5.3 mmol/L    Carbon Dioxide (CO2) 26 22 - 29 mmol/L    Anion Gap 12 7 - 15 mmol/L    Urea Nitrogen 8.5 6.0 - 20.0 mg/dL    Creatinine 0.72 0.51 - 0.95 mg/dL    GFR Estimate >90 >60 mL/min/1.73m2    Calcium 10.0 8.8 - 10.4 mg/dL    Chloride 100 98 - 107 mmol/L    Glucose 108 (H) 70 - 99 mg/dL    Alkaline Phosphatase 81 40 - 150 U/L    AST 24 0 - 45 U/L    ALT 18 0 - 50 U/L    Protein Total 7.6 6.4 - 8.3 g/dL    Albumin 4.6 3.5 - 5.2 g/dL    Bilirubin Total 0.3 <=1.2 mg/dL   CBC with platelets and differential   Result Value Ref Range    WBC Count 6.2 4.0 - 11.0 10e3/uL    RBC Count 4.74 3.80 - 5.20 10e6/uL    Hemoglobin 14.2 11.7 - 15.7 g/dL    Hematocrit 42.4 35.0 - 47.0 %    MCV 90 78 - 100 fL    MCH 30.0 26.5 - 33.0 pg    MCHC 33.5 31.5 - 36.5 g/dL    RDW 13.2 10.0 - 15.0 %    Platelet Count 196 150 - 450 10e3/uL    % Neutrophils 64 %    % Lymphocytes 27 %    % Monocytes 7 %    % Eosinophils 1 %    % Basophils 1 %    % Immature Granulocytes 1 %    NRBCs per 100 WBC 0 <1 /100    Absolute Neutrophils 4.0 1.6 - 8.3 10e3/uL    Absolute Lymphocytes 1.7 0.8 - 5.3 10e3/uL    Absolute Monocytes 0.4 0.0 - 1.3 10e3/uL    Absolute Eosinophils 0.1 0.0 - 0.7 10e3/uL    Absolute Basophils 0.1 0.0 - 0.2 10e3/uL    Absolute Immature Granulocytes 0.0 <=0.4 10e3/uL    Absolute NRBCs 0.0 10e3/uL     *Note: Due to a large number of results and/or encounters for the requested time period, some results have not been displayed. A complete set of results can be found in Results Review.       Medications   ondansetron (ZOFRAN) injection 4 mg (4 mg Intravenous $Given 11/22/24 1240)   ketorolac (TORADOL) injection 15 mg (15 mg Intravenous $Given 11/22/24 1243)    HYDROmorphone (DILAUDID) injection 1 mg (1 mg Intravenous $Given 11/22/24 4357)       Assessments & Plan (with Medical Decision Making)     49 year old female with history of chronic pain syndrome (follows with pain clinic), depression, SVT, opioid dependence in remission (on Suboxone), anxiety, tobacco use, GERD, and restless leg syndrome who presents for evaluation of abdominal pain.  Patient is s/p ventral hernia repair at the end of August with Dr Hart.  She has continued to have chronic pain in her upper abdomen, but points to multiple areas of her abdomen intermittently painful.  This is patient's 5th ED visit for ongoing abdominal pains. She has been having constipation and diarrhea.   She is also following with pain management for her chronic pain syndrome.    She had follow-up with her surgeon, Dr. Dozier, on 11/11/2024.  I have reviewed the notes from that visit. She has had abdominal CT scan 10/4/2024 that showed no acute findings. However, due to her persistent pain she had repeat CT scan yesterday.  This showed findings suggestive of pancolitis with backwash ileitis.  She was contacted by her surgeon to present to the emergency department for further evaluation and workup and determine if antibiotics are indicated.   Patient last took her Suboxone last night. She has been cutting the film in half and would like to eventually get off Suboxone and wants to be back on Methadone.    On exam she does not appear ill.  No acute distress.  She has generalized tenderness throughout her abdomen, but mostly in the upper abdomen.  Abdomen is otherwise soft.  No evidence of peritonitis.  She is normotensive.  Heart rate 109 bpm.  Afebrile.  Labs are unremarkable.  Patient was unable to provide a stool specimen here.  I did have a long discussion with patient regarding nonspecific colitis and further workup that is needed.  Most importantly is to obtain stool specimen to look for enteric bacterial/viral  pathogen.  This can help to guide treatment, such as antibiotic selection.  I discussed with the patient that in stable occasions, we refrain from starting antibiotics until stool specimen can be obtained.  Regarding her pain, I suspect this is multifactorial, as she suffers from chronic pain syndrome and likely this is also contributing to her symptoms.  I would recommend she not be splitting her Suboxone in half and take it as prescribed.  She did not take any Suboxone today.  I did have a long discussion with her that I would give her 1 dose of IV Dilaudid here for pain, but no prescription for pain medication at discharge.  She should resume her medications as prescribed by her pain clinic.  She should also be in contact with her pain clinic for ongoing pain management.  It does sound like there is plans to do trigger point injections to her abdomen to see if this helps with her discomfort.    She does need close follow-up in regards to the pancolitis.  I recommend she have recheck in a week regardless of what her stool specimens show.  She knows that if anything comes back positive on her stool specimen she will be contacted with further instructions.    Discharge Medication List as of 11/22/2024  1:30 PM          Final diagnoses:   Non-specific colitis   Chronic pain syndrome       11/22/2024   [facility] EMERGENCY DEPT       Penny, JEANNIE Vasquez CNP  11/22/24 [id number]

## 2024-11-22 NOTE — DISCHARGE INSTRUCTIONS
Collect stool specimens and bring to lab.  Follow-up with your clinic provider in 1 week, I have sent referral/message for clinic follow-up.  Drink plenty of fluids to stay hydrated.  Restart your Suboxone as prescribed and follow-up with Pain Clinic for further pain management.    If you stool specimen comes back positive for any organism you will be contacted for further instruction.

## 2024-11-23 DIAGNOSIS — M54.2 CHRONIC NECK PAIN: ICD-10-CM

## 2024-11-23 DIAGNOSIS — M54.6 CHRONIC BILATERAL THORACIC BACK PAIN: ICD-10-CM

## 2024-11-23 DIAGNOSIS — M54.41 CHRONIC BILATERAL LOW BACK PAIN WITH BILATERAL SCIATICA: ICD-10-CM

## 2024-11-23 DIAGNOSIS — M79.18 MYOFASCIAL PAIN: ICD-10-CM

## 2024-11-23 DIAGNOSIS — G89.29 CHRONIC BILATERAL LOW BACK PAIN WITH BILATERAL SCIATICA: ICD-10-CM

## 2024-11-23 DIAGNOSIS — M54.42 CHRONIC BILATERAL LOW BACK PAIN WITH BILATERAL SCIATICA: ICD-10-CM

## 2024-11-23 DIAGNOSIS — G89.29 CHRONIC NECK PAIN: ICD-10-CM

## 2024-11-23 DIAGNOSIS — G89.29 CHRONIC BILATERAL THORACIC BACK PAIN: ICD-10-CM

## 2024-11-23 LAB

## 2024-11-23 PROCEDURE — 87493 C DIFF AMPLIFIED PROBE: CPT | Performed by: NURSE PRACTITIONER

## 2024-11-23 PROCEDURE — 87507 IADNA-DNA/RNA PROBE TQ 12-25: CPT | Performed by: NURSE PRACTITIONER

## 2024-11-24 ASSESSMENT — PAIN SCALES - PAIN ENJOYMENT GENERAL ACTIVITY SCALE (PEG)
INTERFERED_ENJOYMENT_LIFE: 10 - COMPLETELY INTERFERES
AVG_PAIN_PASTWEEK: 8
INTERFERED_GENERAL_ACTIVITY: 10
INTERFERED_ENJOYMENT_LIFE: 10
PEG_TOTALSCORE: 9.33
INTERFERED_GENERAL_ACTIVITY: 10 - COMPLETELY INTERFERES

## 2024-11-25 ENCOUNTER — MYC MEDICAL ADVICE (OUTPATIENT)
Dept: GASTROENTEROLOGY | Facility: CLINIC | Age: 49
End: 2024-11-25

## 2024-11-25 ENCOUNTER — TELEPHONE (OUTPATIENT)
Dept: PALLIATIVE MEDICINE | Facility: CLINIC | Age: 49
End: 2024-11-25

## 2024-11-25 ENCOUNTER — OFFICE VISIT (OUTPATIENT)
Dept: PALLIATIVE MEDICINE | Facility: CLINIC | Age: 49
End: 2024-11-25
Payer: COMMERCIAL

## 2024-11-25 ENCOUNTER — NURSE TRIAGE (OUTPATIENT)
Dept: FAMILY MEDICINE | Facility: CLINIC | Age: 49
End: 2024-11-25

## 2024-11-25 ENCOUNTER — MYC MEDICAL ADVICE (OUTPATIENT)
Dept: PALLIATIVE MEDICINE | Facility: CLINIC | Age: 49
End: 2024-11-25

## 2024-11-25 VITALS — HEART RATE: 91 BPM | DIASTOLIC BLOOD PRESSURE: 97 MMHG | SYSTOLIC BLOOD PRESSURE: 151 MMHG

## 2024-11-25 DIAGNOSIS — R10.13 ABDOMINAL PAIN, EPIGASTRIC: ICD-10-CM

## 2024-11-25 DIAGNOSIS — R10.9 ABDOMINAL WALL PAIN: ICD-10-CM

## 2024-11-25 DIAGNOSIS — M79.18 MYOFASCIAL PAIN: ICD-10-CM

## 2024-11-25 DIAGNOSIS — M79.2 NEUROPATHIC PAIN: Primary | ICD-10-CM

## 2024-11-25 LAB
1OH-MIDAZOLAM UR CFM-MCNC: <20 NG/ML
7AMINOCLONAZEPAM UR CFM-MCNC: <5 NG/ML
A-OH ALPRAZ UR CFM-MCNC: 225 NG/ML
ALPRAZ UR CFM-MCNC: 209 NG/ML
BUPRENORPHINE UR-MCNC: 102 NG/ML
BUPRENORPHINE UR-MCNC: 11 NG/ML
CHLORDIAZEP UR CFM-MCNC: <20 NG/ML
CLONAZEPAM UR CFM-MCNC: <5 NG/ML
DIAZEPAM UR CFM-MCNC: <20 NG/ML
LORAZEPAM UR CFM-MCNC: <20 NG/ML
MIDAZOLAM UR CFM-MCNC: <20 NG/ML
NALOXONE UR CFM-MCNC: <100 NG/ML
NORBUPRENORPHINE UR CFM-MCNC: 229 NG/ML
NORBUPRENORPHINE UR-MCNC: 132 NG/ML
NORDIAZEPAM UR CFM-MCNC: <20 NG/ML
OXAZEPAM UR CFM-MCNC: 1532 NG/ML
TEMAZEPAM UR CFM-MCNC: >4000 NG/ML

## 2024-11-25 PROCEDURE — 64486 TAP BLOCK UNIL BY INJECTION: CPT | Performed by: PAIN MEDICINE

## 2024-11-25 RX ORDER — BUPIVACAINE HYDROCHLORIDE 2.5 MG/ML
10 INJECTION, SOLUTION EPIDURAL; INFILTRATION; INTRACAUDAL ONCE
Status: COMPLETED | OUTPATIENT
Start: 2024-11-25 | End: 2024-11-25

## 2024-11-25 RX ORDER — BACLOFEN 20 MG/1
20 TABLET ORAL 3 TIMES DAILY
Qty: 90 TABLET | Refills: 1 | Status: SHIPPED | OUTPATIENT
Start: 2024-11-25

## 2024-11-25 RX ORDER — TRIAMCINOLONE ACETONIDE 40 MG/ML
40 INJECTION, SUSPENSION INTRA-ARTICULAR; INTRAMUSCULAR ONCE
Status: COMPLETED | OUTPATIENT
Start: 2024-11-25 | End: 2024-11-25

## 2024-11-25 RX ADMIN — TRIAMCINOLONE ACETONIDE 40 MG: 40 INJECTION, SUSPENSION INTRA-ARTICULAR; INTRAMUSCULAR at 12:33

## 2024-11-25 RX ADMIN — BUPIVACAINE HYDROCHLORIDE 25 MG: 2.5 INJECTION, SOLUTION EPIDURAL; INFILTRATION; INTRACAUDAL at 12:33

## 2024-11-25 ASSESSMENT — PAIN SCALES - GENERAL: PAINLEVEL_OUTOF10: EXTREME PAIN (8)

## 2024-11-25 NOTE — TELEPHONE ENCOUNTER
Received call from patient  requesting refill(s) for baclofen (LIORESAL) 20 MG tablet     Patient last seen on 11/14/24  Next appt scheduled for 01/16/25    E-prescribe to:     [facility], [address]     Will facilitate refill.      Molly Hahn MA  [facility]

## 2024-11-25 NOTE — TELEPHONE ENCOUNTER
Routed to the MA pool to process refill(s) of baclofen 20mg to [facility].    Damaris Lindsay RN-BSN  [facility]

## 2024-11-25 NOTE — TELEPHONE ENCOUNTER
Request appears appropriate, refill approved for 30 day supply +1 additional fill(s).     Jenny Landrum DNP, APRN, AGNP-C  [facility]

## 2024-11-25 NOTE — TELEPHONE ENCOUNTER
Pt was seen today and the injection was done.    Damaris, RN-BSN  [facility]   [phone]

## 2024-11-25 NOTE — PATIENT INSTRUCTIONS
[facility]  Post Procedure Instructions    Today you had:  trigger point injections   occipital nerve block   bursa injection  Joint Injection    Medications used:  lidocaine   bupivacaine   kenalog   dexamethasone        Go to the emergency room if you develop any shortness of breath  Monitor the injection sites for signs and symptoms of infection-fever, chills, redness, swelling, warmth, or drainage to areas.  You may have soreness at injection sites for up to 24 hours.  It may take up to 14 days for the steroid medication to start working although you may feel the effect as early as a few days after the procedure.     You may apply ice to the painful areas to help minimize the discomfort of the needle pokes.  Do not apply heat to sites for at least 12 hours.  You may use anti-inflammatory medications or Tylenol for pain control if necessary    Pain Clinic phone number during work hours (Monday through Friday 8 am-4:30 pm) at [phone] or the Provider Line after hours at [phone]:

## 2024-11-25 NOTE — TELEPHONE ENCOUNTER
Saw Dr Dozier in office on 11/11  CT completed on 11/21   Patient was called on Friday 11/22 and told to present to ER d/t CT results.    In ER 11/22 they did blood work and patient did stool samples. Patient told all her results were normal.    Patient still having abdominal pain is about the same. Pain is upper abdomen and down the sides. She says left side is worse. Patient looking for guidance on next steps in regards to abdomen pain now that the CT was done and she was seen in ER and those tests were negative.     Patient reporting 8/10 pain. Patient reports LBM 11/24. Denies blood in stool. Denies vomiting. Denies fever. Denies CP or SOB.     Patient advised to go to ER for pain. But she declines and is asking Dr Dozier for guidance.    Lary Taveras RN on 11/25/2024 at 3:14 PM    Reason for Disposition   Pain is mainly in upper abdomen (if needed ask: 'is it mainly above the belly button?')   SEVERE abdominal pain (e.g., excruciating)    Additional Information   Negative: Passed out (e.g., fainted, lost consciousness, blacked out and was not responding)   Negative: Shock suspected (e.g., cold/pale/clammy skin, too weak to stand, low BP, rapid pulse)   Negative: Sounds like a life-threatening emergency to the triager   Negative: SEVERE difficulty breathing (e.g., struggling for each breath, speaks in single words)   Negative: Shock suspected (e.g., cold/pale/clammy skin, too weak to stand, low BP, rapid pulse)   Negative: Difficult to awaken or acting confused (e.g., disoriented, slurred speech)   Negative: Passed out (e.g., fainted, lost consciousness, blacked out and was not responding)   Negative: Visible sweat on face or sweat is dripping down   Negative: Sounds like a life-threatening emergency to the triager   Negative: Followed an abdomen (stomach) injury   Negative: Chest pain   Negative: Abdominal pain and pregnant < 20 weeks   Negative: Abdominal pain and pregnant 20 or more weeks   Negative:  Abdomen bloating or swelling are main symptoms    Protocols used: Abdominal Pain - Female-A-OH, Abdominal Pain - Upper-A-OH

## 2024-11-25 NOTE — NURSING NOTE
Patient reports not taking any antibiotics for active infection.     Peyton Gomez MA  [facility] [place]

## 2024-11-25 NOTE — TELEPHONE ENCOUNTER
Patient called today regarding continued pain.  Not worse than it was last week.  Workup in the ER as well as stool cultures were all negative.  Denies any fevers or chills.  Had a block placed by pain management today.  Not sure if that makes a difference.  She is also awaiting a second opinion with [facility].  At this point I recommended she contact her GI to get additional input regarding the pancolitis.  If it worsens I did recommend she go to the ER and she is agreeable to that.

## 2024-11-25 NOTE — TELEPHONE ENCOUNTER
No call / attempt to contact noted on chart review.     Patient does have appointment for injection with Dr. Reynaga on this date.     Reema Gaffney RN

## 2024-11-25 NOTE — PROGRESS NOTES
Diagnoses and all orders for this visit:  Neuropathic pain  Abdominal wall pain  Current Procedure: Transversus Abdominis Plane nerve block  Current Indication (include preoperative):  Alleviation of pain  Operators: King Reynaga MD and Dr. Levine(fellow)  Sonographic guidance will be used to ensure accurate placement.    PATIENT EDUCATION:  Ready to learn with no apparent learning barriers identified.  Learning preferences include listening. Explained diagnosis and treatment plan as well as treatment alternatives. Patient expressed understanding of the content.  Following denial of allergy and review of potential side effects and complications including but not necessarily limited to infection, bleeding, allergic reaction, post-injection flare, local tissue breakdown, injury to soft tissue and/or nerves and seizure, patient indicated their understanding and agreed to proceed. A written consent was obtained and is scanned into the chart. Written and signed consent obtained and is scanned into the chart.  PROCEDURE:  Prior to the procedure,a 12 MHz linear transducer was used to visualize the abdominal/groin musculature to determine the approach for the procedure.  Procedure was carried out using sterile technique including Chloraprep scrub, a sterile transducer cover, and sterile transducer gel. A simple surgical tray was used.  PROCEDURAL PAUSE:  Procedural pause conducted to verify correct patient identity, procedure to be performed, and as applicable, correct side/site, correct patient position, availability of implants, special equipment, or special requirements.  Patient position:  Supine  Transducer type:  12 MHz linear array transducer  Approach:  Medial to lateral parallel to long axis of transducer  Local Anesthesia:  25 gauge 2.5 inch needle was used to anesthetize the skin, subcutaneous tissue  with 5 ml of 1% Lidocaine  Injection: After confirming needle tip position, syringe was replaced with one  containing 1 ml of 40 mg/ml kenalog and 10 ml of 0.25% Bupivacaine which was injected and seen hydodissecting the fascia between the transversus abdominis muscle and the internal oblique muscle.  N  eedle was removed bandage placed over the wound.  AFTERCARE:  Patient tolerated the procedure without complication. After a short observation period, the patient was discharged under their own power and in excellent condition.  Thank you  Pain noted to be a 8/10 before completion of the procedure and 8/10 after completion of the procedure.      Injection solution contained:  9ml of 0.25% bupivacaine, and 40mg of kenalog.

## 2024-11-25 NOTE — TELEPHONE ENCOUNTER
[facility]    Phone Message    May a detailed message be left on voicemail: yes     Reason for Call: Other: Pt is calling and stating that someone called her but writer does not see any notes. Please call Pt back to discuss.      Action Taken: Message routed to:  Other: [facility]    Travel Screening: Not Applicable     Date of Service:

## 2024-11-26 LAB — MISCELLANEOUS TEST 1 (ARUP): NORMAL

## 2024-11-28 ENCOUNTER — PATIENT OUTREACH (OUTPATIENT)
Dept: CARE COORDINATION | Facility: CLINIC | Age: 49
End: 2024-11-28
Payer: COMMERCIAL

## 2024-12-01 ASSESSMENT — ANXIETY QUESTIONNAIRES
1. FEELING NERVOUS, ANXIOUS, OR ON EDGE: NEARLY EVERY DAY
2. NOT BEING ABLE TO STOP OR CONTROL WORRYING: NEARLY EVERY DAY
3. WORRYING TOO MUCH ABOUT DIFFERENT THINGS: NEARLY EVERY DAY
6. BECOMING EASILY ANNOYED OR IRRITABLE: MORE THAN HALF THE DAYS
4. TROUBLE RELAXING: NEARLY EVERY DAY
7. FEELING AFRAID AS IF SOMETHING AWFUL MIGHT HAPPEN: NEARLY EVERY DAY
8. IF YOU CHECKED OFF ANY PROBLEMS, HOW DIFFICULT HAVE THESE MADE IT FOR YOU TO DO YOUR WORK, TAKE CARE OF THINGS AT HOME, OR GET ALONG WITH OTHER PEOPLE?: EXTREMELY DIFFICULT
5. BEING SO RESTLESS THAT IT IS HARD TO SIT STILL: NEARLY EVERY DAY
IF YOU CHECKED OFF ANY PROBLEMS ON THIS QUESTIONNAIRE, HOW DIFFICULT HAVE THESE PROBLEMS MADE IT FOR YOU TO DO YOUR WORK, TAKE CARE OF THINGS AT HOME, OR GET ALONG WITH OTHER PEOPLE: EXTREMELY DIFFICULT
7. FEELING AFRAID AS IF SOMETHING AWFUL MIGHT HAPPEN: NEARLY EVERY DAY
GAD7 TOTAL SCORE: 20

## 2024-12-02 ENCOUNTER — VIRTUAL VISIT (OUTPATIENT)
Dept: PSYCHOLOGY | Facility: CLINIC | Age: 49
End: 2024-12-02
Payer: COMMERCIAL

## 2024-12-02 ENCOUNTER — TELEPHONE (OUTPATIENT)
Dept: GASTROENTEROLOGY | Facility: CLINIC | Age: 49
End: 2024-12-02

## 2024-12-02 DIAGNOSIS — F41.1 GENERALIZED ANXIETY DISORDER: ICD-10-CM

## 2024-12-02 DIAGNOSIS — F33.1 MODERATE EPISODE OF RECURRENT MAJOR DEPRESSIVE DISORDER (H): Primary | ICD-10-CM

## 2024-12-02 PROCEDURE — 90837 PSYTX W PT 60 MINUTES: CPT | Mod: 95 | Performed by: COUNSELOR

## 2024-12-02 NOTE — PROGRESS NOTES
Answers submitted by the patient for this visit:  Patient Health Questionnaire (Submitted on 12/1/2024)  If you checked off any problems, how difficult have these problems made it for you to do your work, take care of things at home, or get along with other people?: Extremely difficult  PHQ9 TOTAL SCORE: 23  Patient Health Questionnaire (G7) (Submitted on 12/1/2024)  BO 7 TOTAL SCORE: 20          [facility]                                     Progress Note    Patient Name: Radha Beckwith  Date: 12/2/24         Service Type: Individual      Session Start Time: 3:35pm Session End Time: 4:30pm     Session Length:  55    Session #: 61    Attendees: Client    Service Modality:  video      Provider verified identity through the following two step process.  Patient provided:  Patient is known previously to provider    Telemedicine Visit: The patient's condition can be safely assessed and treated via synchronous audio and visual telemedicine encounter.      Reason for Telemedicine Visit: Patient convenience (e.g. access to timely appointments / distance to available provider)    Originating Site (Patient Location): Patient's home    Distant Site (Provider Location): Provider Remote Setting- Home Office    Consent:  The patient/guardian has verbally consented to: the potential risks and benefits of telemedicine (video visit) versus in person care; bill my insurance or make self-payment for services provided; and responsibility for payment of non-covered services.     Patient would like the video invitation sent by:  My Chart    Mode of Communication:  video    Distant Location (Provider):  Off-site home office    As the provider I attest to compliance with applicable laws and regulations related to telemedicine.    DATA  Interactive Complexity: No  Crisis: No        Progress Since Last Session (Related to Symptoms / Goals / Homework):   Symptoms: No change .    Homework: Completed in  session      Episode of Care Goals: Satisfactory progress - MAINTENANCE (Working to maintain change, with risk of relapse); Intervened by continuing to positively reinforce healthy behavior choice      Current / Ongoing Stressors and Concerns:   Client stated is doing okay. Stated she's really tired from going through stuff and organizing.    Is having trouble sleeping throughout the night. Wondering if it might be a drug interaction.   Still working on SSDI.        Treatment Objective(s) Addressed in This Session:   Increase interest, engagement, and pleasure in doing things  Feel less tired and more energy during the day        Intervention:   Mostly checked in with the client today as she is doing well. She's feeling productive and confident about herself. Talked about the medications and the difficulty she is having with staying asleep. Educated the client about body scans, referred her to some guided ones to practice, and encouraged her to talk to her doctor about them.     Assessments completed prior to visit:  The following assessments were completed by patient for this visit:  PHQ9:       10/7/2024    10:50 AM 10/14/2024     3:26 PM 10/27/2024     7:57 PM 11/4/2024    12:53 PM 11/10/2024     7:02 PM 11/20/2024    10:28 AM 12/1/2024     4:41 PM   PHQ-9 SCORE   PHQ-9 Total Score MyChart 24 (Severe depression) 23 (Severe depression) 23 (Severe depression) 22 (Severe depression) 21 (Severe depression) 23 (Severe depression) 23 (Severe depression)   PHQ-9 Total Score 24 23 23  22  21  23  23        Patient-reported     GAD7:       8/13/2024     5:35 PM 8/28/2024     2:06 PM 9/16/2024     3:27 PM 10/3/2024     1:58 PM 10/27/2024     7:59 PM 11/10/2024     7:03 PM 12/1/2024     4:42 PM   BO-7 SCORE   Total Score 19 (severe anxiety) 21 (severe anxiety) 20 (severe anxiety) 20 (severe anxiety) 20 (severe anxiety) 19 (severe anxiety) 20 (severe anxiety)   Total Score 19    19 21 20    20 20    20 20  19  20         Patient-reported    Multiple values from one day are sorted in reverse-chronological order     PROMIS 10-Global Health (all questions and answers displayed):       4/17/2024     1:28 PM 7/25/2024    11:56 AM 8/4/2024     7:20 PM 8/10/2024     2:51 PM 8/18/2024    11:36 AM 8/28/2024     2:07 PM 12/1/2024     4:43 PM   PROMIS 10   In general, would you say your health is: Poor Poor Poor Fair Poor Poor Poor   In general, would you say your quality of life is: Poor Fair Poor Poor Fair Fair Poor   In general, how would you rate your physical health? Poor Poor Poor Poor Poor Fair Poor   In general, how would you rate your mental health, including your mood and your ability to think? Poor Poor Poor Poor Poor Poor Poor   In general, how would you rate your satisfaction with your social activities and relationships? Poor Poor Fair Poor Fair Fair Poor   In general, please rate how well you carry out your usual social activities and roles Poor Poor Poor Poor Fair Fair Poor   To what extent are you able to carry out your everyday physical activities such as walking, climbing stairs, carrying groceries, or moving a chair? A little A little Moderately A little A little A little A little   In the past 7 days, how often have you been bothered by emotional problems such as feeling anxious, depressed, or irritable? Always Always Always Always Always Always Always   In the past 7 days, how would you rate your fatigue on average? Very severe Severe Severe Very severe Very severe Severe Severe   In the past 7 days, how would you rate your pain on average, where 0 means no pain, and 10 means worst imaginable pain? 7 7 7 7 7 7 8   In general, would you say your health is: 1  1  1  2  1  1  1    In general, would you say your quality of life is: 1  2  1  1  2  2  1    In general, how would you rate your physical health? 1  1  1  1  1  2  1    In general, how would you rate your mental health, including your mood and your ability to think?  1  1  1  1  1  1  1    In general, how would you rate your satisfaction with your social activities and relationships? 1  1  2  1  2  2  1    In general, please rate how well you carry out your usual social activities and roles. (This includes activities at home, at work and in your community, and responsibilities as a parent, child, spouse, employee, friend, etc.) 1  1  1  1  2  2  1    To what extent are you able to carry out your everyday physical activities such as walking, climbing stairs, carrying groceries, or moving a chair? 2  2  3  2  2  2  2    In the past 7 days, how often have you been bothered by emotional problems such as feeling anxious, depressed, or irritable? 5  5  5  5  5  5  5    In the past 7 days, how would you rate your fatigue on average? 5  4  4  5  5  4  4    In the past 7 days, how would you rate your pain on average, where 0 means no pain, and 10 means worst imaginable pain? 7  7  7  7  7  7  8    Global Mental Health Score 4    4 5    5 5 4    4 6 6 4    Global Physical Health Score 6    6 7    7 8 6    6 6 8 7    PROMIS TOTAL - SUBSCORES 10    10 12    12 13 10    10 12 14 11        Patient-reported    Multiple values from one day are sorted in reverse-chronological order     Gray Suicide Severity Rating Scale (Lifetime/Recent)      8/21/2024    10:30 AM 8/30/2024     8:20 AM 8/31/2024    12:07 PM 9/5/2024     5:33 PM 9/13/2024    10:52 AM 10/4/2024     4:54 PM 11/22/2024    11:34 AM   Gray Suicide Severity Rating (Lifetime/Recent)   Q1 Wished to be Dead (Past Month) 0-->no 0-->no 0-->no 0-->no 0-->no 0-->no 0-->no   Q2 Suicidal Thoughts (Past Month) 0-->no 0-->no 0-->no 0-->no 0-->no 0-->no 0-->no   Q6 Suicide Behavior (Lifetime) 0-->no 0-->no 0-->no 1-->yes 0-->no 0-->no 0-->no   If yes to Q6, within past 3 months?    0-->no      Level of Risk per Screen no risks indicated no risks indicated no risks indicated moderate risk no risks indicated no risks indicated no risks  indicated         ASSESSMENT: Current Emotional / Mental Status (status of significant symptoms):   Risk status (Self / Other harm or suicidal ideation)   Patient denies current fears or concerns for personal safety.   Patient denies current or recent suicidal ideation or behaviors.   Patient denies current or recent homicidal ideation or behaviors.   Patient denies current or recent self injurious behavior or ideation.   Patient denies other safety concerns.   Patient reports there has been no change in risk factors since their last session.     Patient reports there has been no change in protective factors since their last session.     Recommended that patient call 911 or go to the local ED should there be a change in any of these risk factors.     Appearance:   Appropriate    Eye Contact:   Good    Psychomotor Behavior: Normal    Attitude:   Cooperative    Orientation:   All   Speech    Rate / Production: Normal/ Responsive Normal     Volume:  Normal    Mood:    Normal   Affect:    Appropriate    Thought Content:  Clear    Thought Form:  Coherent    Insight:    Good      Medication Review:   No changes to current psychiatric medication(s)     Medication Compliance:   Yes     Changes in Health Issues:   None reported     Chemical Use Review:   Substance Use: Chemical use reviewed, no active concerns identified      Tobacco Use: No change in amount of tobacco use since last session.  Patient declined discussion at this time    Diagnosis:  1. Moderate episode of recurrent major depressive disorder (H)    2. Generalized anxiety disorder            Collateral Reports Completed:   Not Applicable    PLAN: (Patient Tasks / Therapist Tasks / Other)  Continue to utilize stress reduction skills daily.   Practice body scan technique.           ERIK Keller                                                         ______________________________________________________________________    Individual Treatment  Plan    Patient's Name: Radha Beckwith  YOB: 1975    Date of Creation: 6/28/23  Date Treatment Plan Last Reviewed/Revised: 12/2/24    DSM5 Diagnoses: 296.32 (F33.1) Major Depressive Disorder, Recurrent Episode, Moderate _  Psychosocial / Contextual Factors: living with boyfriend, memory issues  PROMIS (reviewed every 90 days):     Referral / Collaboration:  Referral to another professional/service is not indicated at this time..    Anticipated number of session for this episode of care: 9-12 sessions  Anticipation frequency of session:  as needed  Anticipated Duration of each session: 38-52 minutes  Treatment plan will be reviewed in 90 days or when goals have been changed.       MeasurableTreatment Goal(s) related to diagnosis / functional impairment(s)  Goal 1: Patient will increase communication skills with family members.    Objective #A (Patient Action)    Patient will learn & utilize at least 2 assertive communication skills weekly.  Status: Continued - Date(s): 12/2/24    Intervention(s)  Therapist will teach emotional regulation skills. distress tolerance skills, interpersonal effectiveness skills, emotion regluation skills, mindfulness skills, radical acceptance. Therapist will teach client how to ID body cues for anxiety, anxiety reduction techniques, how to ID triggers for depression and anxiety- decrease reactivity/eliminate, lifestyle changes to reduce depression and anxiety, communication skills, explore cognitive beliefs and help client to develop healthy cognitive patterns and beliefs.    Objective #B  Patient will use thought-stopping strategy daily to reduce intrusive thoughts.  Status: Continued - Date(s): 12/2/24    Intervention(s)  Therapist will teach emotional regulation skills. distress tolerance skills, interpersonal effectiveness skills, emotion regluation skills, mindfulness skills, radical acceptance. Therapist will teach client how to ID body cues for anxiety,  anxiety reduction techniques, how to ID triggers for depression and anxiety- decrease reactivity/eliminate, lifestyle changes to reduce depression and anxiety, communication skills, explore cognitive beliefs and help client to develop healthy cognitive patterns and beliefs.      Goal 2: Patient will decrease depression symptoms.      Objective #A (Patient Action)    Status: Continued - Date(s): 12/2/24    Patient will Increase interest, engagement, and pleasure in doing things  Decrease frequency and intensity of feeling down, depressed, hopeless  Improve quantity and quality of night time sleep / decrease daytime naps  Feel less tired and more energy during the day   Improve diet, appetite, mindful eating, and / or meal planning  Identify negative self-talk and behaviors: challenge core beliefs, myths, and actions  Improve concentration, focus, and mindfulness in daily activities   Feel less fidgety, restless or slow in daily activities / interpersonal interactions.    Intervention(s)  Therapist will teach emotional regulation skills. distress tolerance skills, interpersonal effectiveness skills, emotion regluation skills, mindfulness skills, radical acceptance. Therapist will teach client how to ID body cues for anxiety, anxiety reduction techniques, how to ID triggers for depression and anxiety- decrease reactivity/eliminate, lifestyle changes to reduce depression and anxiety, communication skills, explore cognitive beliefs and help client to develop healthy cognitive patterns and beliefs.    Objective #B  Patient will identify three distraction and diversion activities and use those activities to decrease level of anxiety  .    Status: Continued - Date(s):  12/2/24    Intervention(s)  Therapist will teach emotional regulation skills. distress tolerance skills, interpersonal effectiveness skills, emotion regluation skills, mindfulness skills, radical acceptance. Therapist will teach client how to ID body cues for  anxiety, anxiety reduction techniques, how to ID triggers for depression and anxiety- decrease reactivity/eliminate, lifestyle changes to reduce depression and anxiety, communication skills, explore cognitive beliefs and help client to develop healthy cognitive patterns and beliefs.      Patient has reviewed and agreed to the above plan.      Ricarda Bhatia [facility]

## 2024-12-02 NOTE — TELEPHONE ENCOUNTER
"Endoscopy Scheduling Screen    Have you had any respiratory illness or flu-like symptoms in the last 10 days?  No    What is your communication preference for Instructions and/or Bowel Prep?   MyChart    What insurance is in the chart?  Other:  [employer]    Ordering/Referring Provider: KAJAL VILLAGOMEZ   (If ordering provider performs procedure, schedule with ordering provider unless otherwise instructed. )    BMI: Estimated body mass index is 27.64 kg/m  as calculated from the following:    Height as of 11/4/24: 1.626 m (5' 4\").    Weight as of 11/22/24: 73 kg (161 lb).     Sedation Ordered  MAC/deep sedation.   BMI<= 45 45 < BMI <= 48 48 < BMI < = 50  BMI > 50   No Restrictions No [facility]  No [facility]  [facility] with exceptions Hospital Only OR Only       Do you have a history of malignant hyperthermia?  No    (Females) Are you currently pregnant?   No     Have you been diagnosed or told you have pulmonary hypertension?   No    Do you have an LVAD?  No    Have you been told you have moderate to severe sleep apnea?  No.    Have you been told you have COPD, asthma, or any other lung disease?  No    Do you have any heart conditions?  No     Have you ever had or are you waiting for an organ transplant?  No. Continue scheduling, no site restrictions.    Have you had a stroke or transient ischemic attack (TIA aka \"mini stroke\" in the last 6 months?   No    Have you been diagnosed with or been told you have cirrhosis of the liver?   No.    Are you currently on dialysis?   No    Do you need assistance transferring?   No    BMI: Estimated body mass index is 27.64 kg/m  as calculated from the following:    Height as of 11/4/24: 1.626 m (5' 4\").    Weight as of 11/22/24: 73 kg (161 lb).     Is patients BMI > 40 and scheduling location [facility]?  No    Do you take an injectable or oral medication for weight loss or diabetes (excluding insulin)?  No    Do you take the medication Naltrexone?  No    Do you take blood thinners?  No "       Prep   Are you currently on dialysis or do you have chronic kidney disease?  No    Do you have a diagnosis of diabetes?  No    Do you have a diagnosis of cystic fibrosis (CF)?  No    On a regular basis do you go 3 -5 days between bowel movements?  Yes (Extended Prep)    BMI > 40?  No    [facility] [address] [place] [address]  Phone: [phone] Fax: [phone]    Final Scheduling Details     Procedure scheduled  Colonoscopy    Surgeon:  MADELYN     Date of procedure:  1/28/25     Pre-OP / PAC:   No - Not required for this site.    Location  [facility] - Per order.    Sedation   MAC/Deep Sedation - Per order.      Patient Reminders:   You will receive a call from a Nurse to review instructions and health history.  This assessment must be completed prior to your procedure.  Failure to complete the Nurse assessment may result in the procedure being cancelled.      On the day of your procedure, please designate an adult(s) who can drive you home stay with you for the next 24 hours. The medicines used in the exam will make you sleepy. You will not be able to drive.      You cannot take public transportation, ride share services, or non-medical taxi service without a responsible caregiver.  Medical transport services are allowed with the requirement that a responsible caregiver will receive you at your destination.  We require that drivers and caregivers are confirmed prior to your procedure.

## 2024-12-05 ENCOUNTER — VIRTUAL VISIT (OUTPATIENT)
Dept: FAMILY MEDICINE | Facility: CLINIC | Age: 49
End: 2024-12-05
Attending: NURSE PRACTITIONER
Payer: COMMERCIAL

## 2024-12-05 ENCOUNTER — LAB (OUTPATIENT)
Dept: LAB | Facility: CLINIC | Age: 49
End: 2024-12-05
Payer: COMMERCIAL

## 2024-12-05 DIAGNOSIS — K59.03 CONSTIPATION DUE TO OPIOID THERAPY: ICD-10-CM

## 2024-12-05 DIAGNOSIS — T40.2X5A CONSTIPATION DUE TO OPIOID THERAPY: ICD-10-CM

## 2024-12-05 DIAGNOSIS — K52.9 NON-SPECIFIC COLITIS: ICD-10-CM

## 2024-12-05 DIAGNOSIS — K52.9 NON-SPECIFIC COLITIS: Primary | ICD-10-CM

## 2024-12-05 DIAGNOSIS — R10.9 CHRONIC ABDOMINAL PAIN: ICD-10-CM

## 2024-12-05 DIAGNOSIS — G89.29 CHRONIC ABDOMINAL PAIN: ICD-10-CM

## 2024-12-05 LAB
ALBUMIN SERPL BCG-MCNC: 4.6 G/DL (ref 3.5–5.2)
ALBUMIN UR-MCNC: NEGATIVE MG/DL
ALP SERPL-CCNC: 97 U/L (ref 40–150)
ALT SERPL W P-5'-P-CCNC: 21 U/L (ref 0–50)
ANION GAP SERPL CALCULATED.3IONS-SCNC: 13 MMOL/L (ref 7–15)
APPEARANCE UR: CLEAR
AST SERPL W P-5'-P-CCNC: 20 U/L (ref 0–45)
BASOPHILS # BLD AUTO: 0 10E3/UL (ref 0–0.2)
BASOPHILS NFR BLD AUTO: 1 %
BILIRUB SERPL-MCNC: 0.2 MG/DL
BILIRUB UR QL STRIP: NEGATIVE
BUN SERPL-MCNC: 14.3 MG/DL (ref 6–20)
CALCIUM SERPL-MCNC: 9.2 MG/DL (ref 8.8–10.4)
CHLORIDE SERPL-SCNC: 98 MMOL/L (ref 98–107)
COLOR UR AUTO: YELLOW
CREAT SERPL-MCNC: 0.72 MG/DL (ref 0.51–0.95)
EGFRCR SERPLBLD CKD-EPI 2021: >90 ML/MIN/1.73M2
EOSINOPHIL # BLD AUTO: 0 10E3/UL (ref 0–0.7)
EOSINOPHIL NFR BLD AUTO: 1 %
ERYTHROCYTE [DISTWIDTH] IN BLOOD BY AUTOMATED COUNT: 13.7 % (ref 10–15)
GLUCOSE SERPL-MCNC: 117 MG/DL (ref 70–99)
GLUCOSE UR STRIP-MCNC: NEGATIVE MG/DL
HCO3 SERPL-SCNC: 26 MMOL/L (ref 22–29)
HCT VFR BLD AUTO: 41.2 % (ref 35–47)
HGB BLD-MCNC: 14.1 G/DL (ref 11.7–15.7)
HGB UR QL STRIP: NEGATIVE
HYALINE CASTS: 1 /LPF
IMM GRANULOCYTES # BLD: 0 10E3/UL
IMM GRANULOCYTES NFR BLD: 1 %
KETONES UR STRIP-MCNC: NEGATIVE MG/DL
LEUKOCYTE ESTERASE UR QL STRIP: NEGATIVE
LYMPHOCYTES # BLD AUTO: 2.6 10E3/UL (ref 0.8–5.3)
LYMPHOCYTES NFR BLD AUTO: 30 %
MCH RBC QN AUTO: 30.5 PG (ref 26.5–33)
MCHC RBC AUTO-ENTMCNC: 34.2 G/DL (ref 31.5–36.5)
MCV RBC AUTO: 89 FL (ref 78–100)
MONOCYTES # BLD AUTO: 0.4 10E3/UL (ref 0–1.3)
MONOCYTES NFR BLD AUTO: 5 %
MUCOUS THREADS #/AREA URNS LPF: PRESENT /LPF
NEUTROPHILS # BLD AUTO: 5.5 10E3/UL (ref 1.6–8.3)
NEUTROPHILS NFR BLD AUTO: 63 %
NITRATE UR QL: NEGATIVE
NRBC # BLD AUTO: 0 10E3/UL
NRBC BLD AUTO-RTO: 0 /100
PH UR STRIP: 5 [PH] (ref 5–7)
PLATELET # BLD AUTO: 213 10E3/UL (ref 150–450)
POTASSIUM SERPL-SCNC: 4.2 MMOL/L (ref 3.4–5.3)
PROT SERPL-MCNC: 7.6 G/DL (ref 6.4–8.3)
RBC # BLD AUTO: 4.63 10E6/UL (ref 3.8–5.2)
RBC URINE: 2 /HPF
SODIUM SERPL-SCNC: 137 MMOL/L (ref 135–145)
SP GR UR STRIP: 1.03 (ref 1–1.03)
SQUAMOUS EPITHELIAL: <1 /HPF
UROBILINOGEN UR STRIP-MCNC: NORMAL MG/DL
WBC # BLD AUTO: 8.7 10E3/UL (ref 4–11)
WBC URINE: 1 /HPF

## 2024-12-05 NOTE — PROGRESS NOTES
Linette is a 49 year old who is being evaluated via a billable video visit.    How would you like to obtain your AVS? MyChart  If the video visit is dropped, the invitation should be resent by: Text to cell phone: [phone]  Will anyone else be joining your video visit? No      Assessment & Plan     Non-specific colitis  - Primary Care Referral  - CBC with Platelets & Differential; Future  - Comprehensive metabolic panel; Future  - UA with Microscopic reflex to Culture    Chronic abdominal pain  Constipation due to opioid therapy  Opiate dependence in remission     Linette follows up today on her abdominal pain. She did recently have CT with evidence of Colitis, treated conservatively. She is not having improvement in her pain at this point. Repeat lab work today does not show evidence of infection. I did consider repeat CT however with normal labs, concern for abscess formation at this point would be rather low. She does have chronic pain and post-surgical pain which is likely contributing to her symptoms as well. We discussed she is followed closely with GI who did recommend Colonoscopy. This is scheduled for January. Linette could consider contacting GI for consideration of a sooner appointment given her symptoms and ongoing concerns. She would like to follow-up with her PCP in regards to her other chronic medical conditions, lipoma and bruising. We discussed this would be best evaluated in person. I will have nursing staff reach out to her to schedule.       MED REC REQUIRED  Post Medication Reconciliation Status:     Nicotine/Tobacco Cessation  She reports that she has been smoking cigarettes. She has a 5 pack-year smoking history. She has never been exposed to tobacco smoke. She has never used smokeless tobacco.  Nicotine/Tobacco Cessation Plan  Information offered: Patient not interested at this time    I explained my diagnostic considerations and recommendations to the patient, who voiced understanding and  agreement with the assessment and treatment plan. All questions were answered to patient's apparent satisfaction. We discussed potential side effects of any prescribed or recommended therapies, as well as expectations for response to treatments and importance of lifestyle measures that may improve symptoms. Patient was advised to contact our office if there are new symptoms or no improvement or worsening of conditions or symptoms.    The longitudinal plan of care for the diagnosis(es)/condition(s) as documented were addressed during this visit. Due to the added complexity in care, I will continue to support Linette in the subsequent management and with ongoing continuity of care.    Greater than 30 minutes were spent today with more than 50% dedicated to counseling and coordination of care of the above mentioned problems.        Subjective   Linette is a 49 year old, presenting for the following health issues:  ER F/U        12/5/2024     3:17 PM   Additional Questions   Roomed by Jamie KAM     Video Start Time: 3:23 PM    [facility]     ED/UC Followup:    Facility:  [facility]   Date of visit: 11/22/24  Reason for visit: colitis   Current Status: doing worse since the ER visit.     Linette presented to the [facility] ED on 11/22/24 for concerns of abdominal pain. She has a history of chronic pain, following with pain clinic, history of opiate dependence, now on Suboxone. She underwent ventral hernia repair with Dr. Dozier in August, 2024 and has continued to have chronic pain in her upper abdomen. She had a CT scan completed on 10/4/24 that did not show any acute findings, this was repeated on 11/21/24 which showed some evidence of pancolitis with backwash ileitis. She was instructed to present to the ED for determination if antibiotics were needed. She did not start antibiotic therapy however stool studies were completed which were normal. She was given one dose of IV dilaudid in the ED and instructed to resume her usual  "pain medications- suboxone. She continued to have pain, and was instructed by Dr. Dozier to follow-up with GI on 11/25/24.  Colonoscopy was ordered per GI.     Today, Linette reports her pain is unchanged. She continues to have ongoing abdominal pain. She reports pain consistently at 7/10 but will increase to 10/10 with activity such as bending over. She states the pain radiates \"through her intestines\". She states she overall feels \"unwell\". She reports nausea and a \"lump\" in her stomach when she tries to eat. She also notes a \"sharp/stabbing\" pain on her bilateral sides. She notes pain from her incision sites as well. She reports a bruise where her hernia is still present. She also notes concerns of lipomas and overall easy bruising.     Patient Active Problem List   Diagnosis    GERD (gastroesophageal reflux disease)    Restless legs    Hyperlipidemia LDL goal <100    Hypokalemia    Tobacco abuse    Anxiety attack    Opioid dependence in remission (H)    Psychophysiological insomnia    Chronic bilateral low back pain without sciatica    Supraventricular tachycardia (H)    Moderate episode of recurrent major depressive disorder (H)    Chronic low back pain with bilateral sciatica    Chronic neck pain    Generalized anxiety disorder    Somatic dysfunction of pelvic region    Myofascial pain    Cervical radiculopathy    Chronic bilateral thoracic back pain    Lumbar radiculopathy    Pain of both sacroiliac joints    Radicular pain of upper extremity    Cervicalgia     Current Outpatient Medications   Medication Sig Dispense Refill    acetaminophen (TYLENOL) 500 MG tablet Take 1,000 mg by mouth every 6 hours as needed for mild pain      ALPRAZolam (XANAX) 0.5 MG tablet       baclofen (LIORESAL) 20 MG tablet TAKE 1 TABLET (20 MG) BY MOUTH 3 TIMES DAILY 90 tablet 1    bisacodyl (DULCOLAX) 5 MG EC tablet Take 1 tablet (5 mg) by mouth every other day Take every other day. If no bowel movement for 2 or more days, take 2 " tablets of bisacodyl (10 mg) 30 tablet 2    buprenorphine HCl-naloxone HCl (SUBOXONE) 2-0.5 MG per film Place 0.5 Film under the tongue daily       busPIRone HCl (BUSPAR) 30 MG tablet 30 mg 3 times daily      cyclobenzaprine (FLEXERIL) 5 MG tablet TAKE ONE TO TWO TABLETS BY MOUTH THREE TIMES A DAY AS NEEDED FOR MUSCLE SPASMS 90 tablet 5    diclofenac (VOLTAREN) 1 % topical gel Apply 4 g topically 4 times daily. 350 g 2    DULoxetine (CYMBALTA) 60 MG capsule Take 1 capsule (60 mg) by mouth 2 times daily. 180 capsule 0    famotidine (PEPCID) 40 MG tablet Take 1 tablet (40 mg) by mouth 2 times daily 60 tablet 1    hyoscyamine (LEVSIN) 0.125 MG tablet Take 1 tablet (125 mcg) by mouth every 6 hours as needed for cramping. 60 tablet 1    lactulose (CONSTULOSE) 10 GM/15ML solution Take 15 mLs (10 g) by mouth 3 times daily as needed for constipation ((as needed for severe constipation, no BM for more than 3 days and feeling constipated).). 300 mL 3    linaclotide (LINZESS) 72 MCG capsule Take 1 capsule (72 mcg) by mouth every morning (before breakfast). 30 capsule 11    medical cannabis (Patient's own supply) See Admin Instructions. Has [place] Medical Cannabis Card- Purchases through [facility].   (The purpose of this order is to document that the patient reports taking medical cannabis.  This is not a prescription, and is not used to certify that the patient has a qualifying medical condition.)      ondansetron (ZOFRAN ODT) 4 MG ODT tab DISSOLVE ONE TABLET BY MOUTH EVERY 6 HOURS AS NEEDED FOR NAUSEA 20 tablet 1    pantoprazole (PROTONIX) 40 MG EC tablet TAKE ONE TABLET BY MOUTH TWICE A DAY WITH MEALS 60 tablet 5    rOPINIRole (REQUIP) 1 MG tablet TAKE ONE TABLET BY MOUTH EVERY NIGHT AT BEDTIME 90 tablet 0    simvastatin (ZOCOR) 10 MG tablet TAKE ONE TABLET BY MOUTH EVERY NIGHT AT BEDTIME 90 tablet 1    temazepam (RESTORIL) 15 MG capsule       hyoscyamine (LEVSIN/SL) 0.125 MG sublingual tablet Place 1 tablet  (0.125 mg) under the tongue every 6 hours as needed for cramping. 60 tablet 1     Current Facility-Administered Medications   Medication Dose Route Frequency Provider Last Rate Last Admin    2.0 mL bupivacaine (MARCAINE) 0.5% injection (50 mL vial)  2 mL      2 mL at 08/22/24 1438    2.0 mL bupivacaine (MARCAINE) 0.5% injection (50 mL vial)  2 mL      2 mL at 08/22/24 1438    lidocaine 1 % injection 2 mL  2 mL      2 mL at 08/22/24 1438    lidocaine 1 % injection 2 mL  2 mL      2 mL at 08/22/24 1438    triamcinolone (KENALOG-40) injection 20 mg  20 mg      20 mg at 08/22/24 1438    triamcinolone (KENALOG-40) injection 20 mg  20 mg      20 mg at 08/22/24 1438             Review of Systems  Constitutional, HEENT, cardiovascular, pulmonary, gi and gu systems are negative, except as otherwise noted.      Objective           Vitals:  No vitals were obtained today due to virtual visit.    Physical Exam   GENERAL: alert and no distress  EYES: Eyes grossly normal to inspection.  No discharge or erythema, or obvious scleral/conjunctival abnormalities.  RESP: No audible wheeze, cough, or visible cyanosis.    SKIN: Visible skin clear. No significant rash, abnormal pigmentation or lesions.  NEURO: Cranial nerves grossly intact.  Mentation and speech appropriate for age.  PSYCH: Appropriate affect, tone, and pace of words    Results for orders placed or performed in visit on 12/05/24 (from the past 24 hours)   UA with Microscopic reflex to Culture    Specimen: Urine, NOS   Result Value Ref Range    Color Urine Yellow Colorless, Straw, Light Yellow, Yellow    Appearance Urine Clear Clear    Glucose Urine Negative Negative mg/dL    Bilirubin Urine Negative Negative    Ketones Urine Negative Negative mg/dL    Specific Gravity Urine 1.026 1.003 - 1.035    Blood Urine Negative Negative    pH Urine 5.0 5.0 - 7.0    Protein Albumin Urine Negative Negative mg/dL    Urobilinogen Urine Normal Normal, 2.0 mg/dL    Nitrite Urine Negative  Negative    Leukocyte Esterase Urine Negative Negative    Mucus Urine Present (A) None Seen /LPF    RBC Urine 2 <=2 /HPF    WBC Urine 1 <=5 /HPF    Squamous Epithelials Urine <1 <=1 /HPF    Hyaline Casts Urine 1 <=2 /LPF    Narrative    Urine Culture not indicated     *Note: Due to a large number of results and/or encounters for the requested time period, some results have not been displayed. A complete set of results can be found in Results Review.         Video-Visit Details    Type of service:  Video Visit   Video End Time:3:49 PM  Originating Location (pt. Location): Home    Distant Location (provider location):  On-site  Platform used for Video Visit: Mai  Signed Electronically by: Reema White NP

## 2024-12-09 ENCOUNTER — OFFICE VISIT (OUTPATIENT)
Dept: FAMILY MEDICINE | Facility: CLINIC | Age: 49
End: 2024-12-09
Payer: COMMERCIAL

## 2024-12-09 VITALS
DIASTOLIC BLOOD PRESSURE: 93 MMHG | HEART RATE: 92 BPM | BODY MASS INDEX: 27.45 KG/M2 | SYSTOLIC BLOOD PRESSURE: 135 MMHG | OXYGEN SATURATION: 98 % | TEMPERATURE: 97.8 F | RESPIRATION RATE: 16 BRPM | HEIGHT: 64 IN | WEIGHT: 160.8 LBS

## 2024-12-09 DIAGNOSIS — R10.84 ABDOMINAL PAIN, GENERALIZED: Primary | ICD-10-CM

## 2024-12-09 DIAGNOSIS — F33.1 MODERATE EPISODE OF RECURRENT MAJOR DEPRESSIVE DISORDER (H): ICD-10-CM

## 2024-12-09 DIAGNOSIS — R10.2 PELVIC PAIN IN FEMALE: ICD-10-CM

## 2024-12-09 PROCEDURE — G2211 COMPLEX E/M VISIT ADD ON: HCPCS | Performed by: NURSE PRACTITIONER

## 2024-12-09 PROCEDURE — 99215 OFFICE O/P EST HI 40 MIN: CPT | Performed by: NURSE PRACTITIONER

## 2024-12-09 ASSESSMENT — PAIN SCALES - GENERAL: PAINLEVEL_OUTOF10: SEVERE PAIN (7)

## 2024-12-09 NOTE — Clinical Note
Hi Madelaine- complex patient.  Any need for further imaging for pancolitis- has normal CBC 12/5 but still with a lot of pain- not sure if from colitis.  Has colonoscopy scheduled.  Thanks for your input.  Gisselle Linn, CNP

## 2024-12-09 NOTE — PROGRESS NOTES
Assessment & Plan     Abdominal pain, generalized  Pelvic pain in female  Complex medical patient followed by pain management Dr. Reynaga seen for abdominal and pelvic pain.  She had hernia repair 8/30/24 and has been having pain since.  Has seen Surgery in follow up with two CT abdomen- second showing possible pancolitis.  Has been seen by GI in follow up.  Colonoscopy ordered for January.  Has seen GYN for pelvic pain with normal us pelvis and MRI.  pelvis as ovaries were unable to be seen on US.  She has had trigger point injections for her abdominal pain.  A second opinion was offered by General Surgery and she is looking into that.  Her pain is not controlled per her report.   She states if she wants to get something done at home she will have to drink 1/2 a bottle of wine.  We discussed alcohol with her current medications.  Pain control options are limited due to her being on multiple sedating medication, medical cannabas and not tolerating medications like gabapentin, amitriptyline, NSAIDS, lidocaine patches.  Will have her try pelvic floor therapy/ physical therapy.  She has hyoscyamine but has only taken it a couple times.  She is working on getting a ride to the [facility] for a second opinion on her hernia repair.  I will also message GI to see if further imaging should be considered with her pancolitis.  Had normal CBC 12/5/24.    - Physical Therapy  Referral; Future    The longitudinal plan of care for the diagnosis(es)/condition(s) as documented were addressed during this visit. Due to the added complexity in care, I will continue to support Linette in the subsequent management and with ongoing continuity of care.     Subjective   Linette is a 49 year old, presenting for the following health issues:  Abdominal Pain      12/9/2024     3:40 PM   Additional Questions   Roomed by Nara         12/9/2024     3:40 PM   Patient Reported Additional Medications   Patient reports taking the following new  "medications none     History of Present Illness       Reason for visit:  Stomach pain, bruising  Symptom onset:  More than a month  Symptoms include:  Stomach pain, bruising, constipation, diarrhea, bruising  Symptom intensity:  Severe  Symptom progression:  Worsening  Had these symptoms before:  Yes  Has tried/received treatment for these symptoms:  Yes  Previous treatment was successful:  No  What makes it worse:  Walking around, bending, anything physically straining  What makes it better:  Heating pad, not moving    She eats 2-3 servings of fruits and vegetables daily.She consumes 2 sweetened beverage(s) daily.She exercises with enough effort to increase her heart rate 9 or less minutes per day.  She exercises with enough effort to increase her heart rate 3 or less days per week.   She is taking medications regularly.           Review of Systems  Constitutional, HEENT, cardiovascular, pulmonary, GI, , musculoskeletal, neuro, skin, endocrine and psych systems are negative, except as otherwise noted.      Objective    BP (!) 135/93 (BP Location: Right arm, Patient Position: Sitting, Cuff Size: Adult Large)   Pulse 92   Temp 97.8  F (36.6  C) (Temporal)   Resp 16   Ht 1.626 m (5' 4\")   Wt 72.9 kg (160 lb 12.8 oz)   LMP  (LMP Unknown)   SpO2 98%   BMI 27.60 kg/m    Body mass index is 27.6 kg/m .  Physical Exam   GENERAL: alert and no distress  EYES: Eyes grossly normal to inspection, PERRL and conjunctivae and sclerae normal  HENT: ear canals and TM's normal, nose and mouth without ulcers or lesions  NECK: no adenopathy, no asymmetry, masses, or scars  RESP: lungs clear to auscultation - no rales, rhonchi or wheezes  CV: regular rate and rhythm, normal S1 S2, no S3 or S4, no murmur, click or rub, no peripheral edema  ABDOMEN: soft, nontender, without hepatosplenomegaly or masses, tenderness RLQ and LLQ, and bowel sounds normal  MS: no gross musculoskeletal defects noted, no edema  SKIN: no suspicious " lesions or rashes  NEURO: Normal strength and tone, mentation intact and speech normal  PSYCH: mentation appears normal, affect normal/bright          Signed Electronically by: Gisslele Linn, NP

## 2024-12-15 DIAGNOSIS — G25.81 RESTLESS LEGS SYNDROME (RLS): ICD-10-CM

## 2024-12-16 ENCOUNTER — VIRTUAL VISIT (OUTPATIENT)
Dept: PSYCHOLOGY | Facility: CLINIC | Age: 49
End: 2024-12-16
Payer: COMMERCIAL

## 2024-12-16 DIAGNOSIS — F41.1 GENERALIZED ANXIETY DISORDER: ICD-10-CM

## 2024-12-16 DIAGNOSIS — F33.1 MODERATE EPISODE OF RECURRENT MAJOR DEPRESSIVE DISORDER (H): Primary | ICD-10-CM

## 2024-12-16 PROCEDURE — 90834 PSYTX W PT 45 MINUTES: CPT | Mod: 95 | Performed by: COUNSELOR

## 2024-12-16 RX ORDER — ROPINIROLE 1 MG/1
TABLET, FILM COATED ORAL
Qty: 90 TABLET | Refills: 0 | Status: SHIPPED | OUTPATIENT
Start: 2024-12-16

## 2024-12-16 ASSESSMENT — ANXIETY QUESTIONNAIRES
GAD7 TOTAL SCORE: 18
4. TROUBLE RELAXING: NEARLY EVERY DAY
3. WORRYING TOO MUCH ABOUT DIFFERENT THINGS: NEARLY EVERY DAY
2. NOT BEING ABLE TO STOP OR CONTROL WORRYING: NEARLY EVERY DAY
6. BECOMING EASILY ANNOYED OR IRRITABLE: MORE THAN HALF THE DAYS
GAD7 TOTAL SCORE: 18
5. BEING SO RESTLESS THAT IT IS HARD TO SIT STILL: SEVERAL DAYS
8. IF YOU CHECKED OFF ANY PROBLEMS, HOW DIFFICULT HAVE THESE MADE IT FOR YOU TO DO YOUR WORK, TAKE CARE OF THINGS AT HOME, OR GET ALONG WITH OTHER PEOPLE?: EXTREMELY DIFFICULT
1. FEELING NERVOUS, ANXIOUS, OR ON EDGE: NEARLY EVERY DAY
IF YOU CHECKED OFF ANY PROBLEMS ON THIS QUESTIONNAIRE, HOW DIFFICULT HAVE THESE PROBLEMS MADE IT FOR YOU TO DO YOUR WORK, TAKE CARE OF THINGS AT HOME, OR GET ALONG WITH OTHER PEOPLE: EXTREMELY DIFFICULT
7. FEELING AFRAID AS IF SOMETHING AWFUL MIGHT HAPPEN: NEARLY EVERY DAY
GAD7 TOTAL SCORE: 18
7. FEELING AFRAID AS IF SOMETHING AWFUL MIGHT HAPPEN: NEARLY EVERY DAY

## 2024-12-16 ASSESSMENT — PATIENT HEALTH QUESTIONNAIRE - PHQ9
SUM OF ALL RESPONSES TO PHQ QUESTIONS 1-9: 23
10. IF YOU CHECKED OFF ANY PROBLEMS, HOW DIFFICULT HAVE THESE PROBLEMS MADE IT FOR YOU TO DO YOUR WORK, TAKE CARE OF THINGS AT HOME, OR GET ALONG WITH OTHER PEOPLE: EXTREMELY DIFFICULT
SUM OF ALL RESPONSES TO PHQ QUESTIONS 1-9: 23

## 2024-12-16 NOTE — PROGRESS NOTES
Answers submitted by the patient for this visit:  Patient Health Questionnaire (Submitted on 12/16/2024)  If you checked off any problems, how difficult have these problems made it for you to do your work, take care of things at home, or get along with other people?: Extremely difficult  PHQ9 TOTAL SCORE: 23  Patient Health Questionnaire (G7) (Submitted on 12/16/2024)  BO 7 TOTAL SCORE: 18            [facility]                                     Progress Note    Patient Name: Radha Beckwith  Date: 12/16/24         Service Type: Individual      Session Start Time: 2:40pm Session End Time: 3:30pm     Session Length:  45    Session #: 62    Attendees: Client    Service Modality:  video      Provider verified identity through the following two step process.  Patient provided:  Patient is known previously to provider    Telemedicine Visit: The patient's condition can be safely assessed and treated via synchronous audio and visual telemedicine encounter.      Reason for Telemedicine Visit: Patient convenience (e.g. access to timely appointments / distance to available provider)    Originating Site (Patient Location): Patient's home    Distant Site (Provider Location): Provider Remote Setting- Home Office    Consent:  The patient/guardian has verbally consented to: the potential risks and benefits of telemedicine (video visit) versus in person care; bill my insurance or make self-payment for services provided; and responsibility for payment of non-covered services.     Patient would like the video invitation sent by:  My Chart    Mode of Communication:  video    Distant Location (Provider):  Off-site home office    As the provider I attest to compliance with applicable laws and regulations related to telemedicine.    DATA  Interactive Complexity: No  Crisis: No        Progress Since Last Session (Related to Symptoms / Goals / Homework):   Symptoms: No change .    Homework: Completed in  session      Episode of Care Goals: Satisfactory progress - MAINTENANCE (Working to maintain change, with risk of relapse); Intervened by continuing to positively reinforce healthy behavior choice      Current / Ongoing Stressors and Concerns:   Client and provider were both running late today.    Client gets her neck oblation tomorrow, is nervous for this.   Her son isn't coming home for [place] this year stating he can't afford it.   Has been having a lot of bruising, randomly. Got a big bruise from recently having blood drawn.        Treatment Objective(s) Addressed in This Session:   Increase interest, engagement, and pleasure in doing things  Feel less tired and more energy during the day        Intervention:   Checked in about client's medical anxieties. Reviewed some recent appointments she had and how confused she's been about them regarding some diagnoses. Helped client to understand some if it was possible, encouraged her to follow up with her doctor for further questions.     Assessments completed prior to visit:  The following assessments were completed by patient for this visit:  PHQ9:       10/14/2024     3:26 PM 10/27/2024     7:57 PM 11/4/2024    12:53 PM 11/10/2024     7:02 PM 11/20/2024    10:28 AM 12/1/2024     4:41 PM 12/16/2024     1:03 PM   PHQ-9 SCORE   PHQ-9 Total Score MyChart 23 (Severe depression) 23 (Severe depression) 22 (Severe depression) 21 (Severe depression) 23 (Severe depression) 23 (Severe depression) 23 (Severe depression)   PHQ-9 Total Score 23 23  22  21  23  23  23        Patient-reported     GAD7:       8/28/2024     2:06 PM 9/16/2024     3:27 PM 10/3/2024     1:58 PM 10/27/2024     7:59 PM 11/10/2024     7:03 PM 12/1/2024     4:42 PM 12/16/2024     1:05 PM   BO-7 SCORE   Total Score 21 (severe anxiety) 20 (severe anxiety) 20 (severe anxiety) 20 (severe anxiety) 19 (severe anxiety) 20 (severe anxiety) 18 (severe anxiety)   Total Score 21 20    20 20    20 20  19  20  18         Patient-reported    Multiple values from one day are sorted in reverse-chronological order     PROMIS 10-Global Health (all questions and answers displayed):       7/25/2024    11:56 AM 8/4/2024     7:20 PM 8/10/2024     2:51 PM 8/18/2024    11:36 AM 8/28/2024     2:07 PM 12/1/2024     4:43 PM 12/16/2024     1:07 PM   PROMIS 10   In general, would you say your health is: Poor Poor Fair Poor Poor Poor Poor   In general, would you say your quality of life is: Fair Poor Poor Fair Fair Poor Poor   In general, how would you rate your physical health? Poor Poor Poor Poor Fair Poor Poor   In general, how would you rate your mental health, including your mood and your ability to think? Poor Poor Poor Poor Poor Poor Poor   In general, how would you rate your satisfaction with your social activities and relationships? Poor Fair Poor Fair Fair Poor Fair   In general, please rate how well you carry out your usual social activities and roles Poor Poor Poor Fair Fair Poor Poor   To what extent are you able to carry out your everyday physical activities such as walking, climbing stairs, carrying groceries, or moving a chair? A little Moderately A little A little A little A little A little   In the past 7 days, how often have you been bothered by emotional problems such as feeling anxious, depressed, or irritable? Always Always Always Always Always Always Always   In the past 7 days, how would you rate your fatigue on average? Severe Severe Very severe Very severe Severe Severe Very severe   In the past 7 days, how would you rate your pain on average, where 0 means no pain, and 10 means worst imaginable pain? 7 7 7 7 7 8 7   In general, would you say your health is: 1  1  2  1  1  1  1    In general, would you say your quality of life is: 2  1  1  2  2  1  1    In general, how would you rate your physical health? 1  1  1  1  2  1  1    In general, how would you rate your mental health, including your mood and your ability to  think? 1  1  1  1  1  1  1    In general, how would you rate your satisfaction with your social activities and relationships? 1  2  1  2  2  1  2    In general, please rate how well you carry out your usual social activities and roles. (This includes activities at home, at work and in your community, and responsibilities as a parent, child, spouse, employee, friend, etc.) 1  1  1  2  2  1  1    To what extent are you able to carry out your everyday physical activities such as walking, climbing stairs, carrying groceries, or moving a chair? 2  3  2  2  2  2  2    In the past 7 days, how often have you been bothered by emotional problems such as feeling anxious, depressed, or irritable? 5  5  5  5  5  5  5    In the past 7 days, how would you rate your fatigue on average? 4  4  5  5  4  4  5    In the past 7 days, how would you rate your pain on average, where 0 means no pain, and 10 means worst imaginable pain? 7  7  7  7  7  8  7    Global Mental Health Score 5    5 5 4    4 6 6 4  5    Global Physical Health Score 7    7 8 6    6 6 8 7  6    PROMIS TOTAL - SUBSCORES 12    12 13 10    10 12 14 11  11        Patient-reported    Multiple values from one day are sorted in reverse-chronological order     [place] Suicide Severity Rating Scale (Lifetime/Recent)      8/21/2024    10:30 AM 8/30/2024     8:20 AM 8/31/2024    12:07 PM 9/5/2024     5:33 PM 9/13/2024    10:52 AM 10/4/2024     4:54 PM 11/22/2024    11:34 AM   [place] Suicide Severity Rating (Lifetime/Recent)   Q1 Wished to be Dead (Past Month) 0-->no 0-->no 0-->no 0-->no 0-->no 0-->no 0-->no   Q2 Suicidal Thoughts (Past Month) 0-->no 0-->no 0-->no 0-->no 0-->no 0-->no 0-->no   Q6 Suicide Behavior (Lifetime) 0-->no 0-->no 0-->no 1-->yes 0-->no 0-->no 0-->no   If yes to Q6, within past 3 months?    0-->no      Level of Risk per Screen no risks indicated no risks indicated no risks indicated moderate risk no risks indicated no risks indicated no risks indicated          ASSESSMENT: Current Emotional / Mental Status (status of significant symptoms):   Risk status (Self / Other harm or suicidal ideation)   Patient denies current fears or concerns for personal safety.   Patient denies current or recent suicidal ideation or behaviors.   Patient denies current or recent homicidal ideation or behaviors.   Patient denies current or recent self injurious behavior or ideation.   Patient denies other safety concerns.   Patient reports there has been no change in risk factors since their last session.     Patient reports there has been no change in protective factors since their last session.     Recommended that patient call 911 or go to the local ED should there be a change in any of these risk factors.     Appearance:   Appropriate    Eye Contact:   Good    Psychomotor Behavior: Normal    Attitude:   Cooperative    Orientation:   All   Speech    Rate / Production: Normal/ Responsive Normal     Volume:  Normal    Mood:    Normal   Affect:    Appropriate    Thought Content:  Clear    Thought Form:  Coherent    Insight:    Good      Medication Review:   No changes to current psychiatric medication(s)     Medication Compliance:   Yes     Changes in Health Issues:   None reported     Chemical Use Review:   Substance Use: Chemical use reviewed, no active concerns identified      Tobacco Use: No change in amount of tobacco use since last session.  Patient declined discussion at this time    Diagnosis:  1. Moderate episode of recurrent major depressive disorder (H)    2. Generalized anxiety disorder            Collateral Reports Completed:   Not Applicable    PLAN: (Patient Tasks / Therapist Tasks / Other)  Continue to utilize stress reduction skills daily.             Ricarda Bhatia [facility]                                                         ______________________________________________________________________    Individual Treatment Plan    Patient's Name: Radha Mcgill  Of Birth: 1975    Date of Creation: 6/28/23  Date Treatment Plan Last Reviewed/Revised: 12/2/24    DSM5 Diagnoses: 296.32 (F33.1) Major Depressive Disorder, Recurrent Episode, Moderate _  Psychosocial / Contextual Factors: living with boyfriend, memory issues  PROMIS (reviewed every 90 days):     Referral / Collaboration:  Referral to another professional/service is not indicated at this time..    Anticipated number of session for this episode of care: 9-12 sessions  Anticipation frequency of session:  as needed  Anticipated Duration of each session: 38-52 minutes  Treatment plan will be reviewed in 90 days or when goals have been changed.       MeasurableTreatment Goal(s) related to diagnosis / functional impairment(s)  Goal 1: Patient will increase communication skills with family members.    Objective #A (Patient Action)    Patient will learn & utilize at least 2 assertive communication skills weekly.  Status: Continued - Date(s): 12/2/24    Intervention(s)  Therapist will teach emotional regulation skills. distress tolerance skills, interpersonal effectiveness skills, emotion regluation skills, mindfulness skills, radical acceptance. Therapist will teach client how to ID body cues for anxiety, anxiety reduction techniques, how to ID triggers for depression and anxiety- decrease reactivity/eliminate, lifestyle changes to reduce depression and anxiety, communication skills, explore cognitive beliefs and help client to develop healthy cognitive patterns and beliefs.    Objective #B  Patient will use thought-stopping strategy daily to reduce intrusive thoughts.  Status: Continued - Date(s): 12/2/24    Intervention(s)  Therapist will teach emotional regulation skills. distress tolerance skills, interpersonal effectiveness skills, emotion regluation skills, mindfulness skills, radical acceptance. Therapist will teach client how to ID body cues for anxiety, anxiety reduction techniques, how to ID triggers for  depression and anxiety- decrease reactivity/eliminate, lifestyle changes to reduce depression and anxiety, communication skills, explore cognitive beliefs and help client to develop healthy cognitive patterns and beliefs.      Goal 2: Patient will decrease depression symptoms.      Objective #A (Patient Action)    Status: Continued - Date(s): 12/2/24    Patient will Increase interest, engagement, and pleasure in doing things  Decrease frequency and intensity of feeling down, depressed, hopeless  Improve quantity and quality of night time sleep / decrease daytime naps  Feel less tired and more energy during the day   Improve diet, appetite, mindful eating, and / or meal planning  Identify negative self-talk and behaviors: challenge core beliefs, myths, and actions  Improve concentration, focus, and mindfulness in daily activities   Feel less fidgety, restless or slow in daily activities / interpersonal interactions.    Intervention(s)  Therapist will teach emotional regulation skills. distress tolerance skills, interpersonal effectiveness skills, emotion regluation skills, mindfulness skills, radical acceptance. Therapist will teach client how to ID body cues for anxiety, anxiety reduction techniques, how to ID triggers for depression and anxiety- decrease reactivity/eliminate, lifestyle changes to reduce depression and anxiety, communication skills, explore cognitive beliefs and help client to develop healthy cognitive patterns and beliefs.    Objective #B  Patient will identify three distraction and diversion activities and use those activities to decrease level of anxiety  .    Status: Continued - Date(s):  12/2/24    Intervention(s)  Therapist will teach emotional regulation skills. distress tolerance skills, interpersonal effectiveness skills, emotion regluation skills, mindfulness skills, radical acceptance. Therapist will teach client how to ID body cues for anxiety, anxiety reduction techniques, how to ID  triggers for depression and anxiety- decrease reactivity/eliminate, lifestyle changes to reduce depression and anxiety, communication skills, explore cognitive beliefs and help client to develop healthy cognitive patterns and beliefs.      Patient has reviewed and agreed to the above plan.      Ricarda Bhatia [facility]

## 2024-12-17 ENCOUNTER — RADIOLOGY INJECTION OFFICE VISIT (OUTPATIENT)
Dept: PALLIATIVE MEDICINE | Facility: CLINIC | Age: 49
End: 2024-12-17
Payer: COMMERCIAL

## 2024-12-17 VITALS — SYSTOLIC BLOOD PRESSURE: 147 MMHG | OXYGEN SATURATION: 95 % | DIASTOLIC BLOOD PRESSURE: 88 MMHG | HEART RATE: 73 BPM

## 2024-12-17 DIAGNOSIS — M47.812 SPONDYLOSIS OF CERVICAL REGION WITHOUT MYELOPATHY OR RADICULOPATHY: ICD-10-CM

## 2024-12-17 DIAGNOSIS — M47.812 ARTHROPATHY OF CERVICAL FACET JOINT: ICD-10-CM

## 2024-12-17 RX ORDER — FENTANYL CITRATE 50 UG/ML
12.5-5 INJECTION, SOLUTION INTRAMUSCULAR; INTRAVENOUS EVERY 5 MIN PRN
Status: ACTIVE | OUTPATIENT
Start: 2024-12-17 | End: 2024-12-18

## 2024-12-17 RX ADMIN — FENTANYL CITRATE 25 MCG: 50 INJECTION, SOLUTION INTRAMUSCULAR; INTRAVENOUS at 14:03

## 2024-12-17 RX ADMIN — FENTANYL CITRATE 25 MCG: 50 INJECTION, SOLUTION INTRAMUSCULAR; INTRAVENOUS at 14:26

## 2024-12-17 ASSESSMENT — PAIN SCALES - GENERAL
PAINLEVEL_OUTOF10: EXTREME PAIN (9)
PAINLEVEL_OUTOF10: SEVERE PAIN (6)

## 2024-12-17 NOTE — NURSING NOTE
22 gauge Peripheral IV inserted into left anticubital - attempts: 1 by Dr. Juhi PATEL Time IN: 1400   Sedation start time:  1401  Sedation end time:  1450    Medications given: fentanyl 50 mcg IV; versed 1 mg IV; toradol 0 mg IV  Intravenous fluids were administered, normal saline 10 cc's.  Sedation Level Achieved:  Moderate (conscious) sedation

## 2024-12-17 NOTE — NURSING NOTE
Discharge Information    IV Discontiued Time:  1508    Amount of Fluid Infused:  10ccs    Discharge Criteria = When patient returns to baseline or as per MD order    Consciousness:  Pt is fully awake    Circulation:  BP +/- 20% of pre-procedure level    Respiration:  Patient is able to breathe deeply    O2 Sat:  Patient is able to maintain O2 Sat >92% on room air    Activity:  Moves 4 extremities on command    Ambulation:  Patient is able to stand and walk or stand and pivot into wheelchair    Dressing:  Clean/dry or No Dressing    Notes:   Discharge instructions and AVS given to patient    Patient meets criteria for discharge?  YES    Admitted to PCU?  No    Responsible adult present to accompany patient home?  Yes    Signature/Title:    Reema Gaffney RN  RN Care Coordinator  [facility]

## 2024-12-17 NOTE — TELEPHONE ENCOUNTER
Screening Questions for RFA Procedure      Procedure ordered? Bilateral cervical 3-4-5 medial branch nerve radiofrequency ablation     What insurance are we billing for this procedure?  BC  IF SCHEDULING AT [facility] PLEASE SCHEDULE AT LEAST 7-10 BUSINESS DAYS OUT SO A PA CAN BE OBTAINED    Is patient scheduled at [facility]? no  If YES, route every encounter to [facility] CARE NAVIGATION POOL [[phone]]  Has patient had this injection before? No  Any chance of pregnancy? NO   If YES, do NOT schedule and route to RN pool     Is  Needed?: No  Will patient have a ?  Yes   If pt is given sedation meds, no driving for 24 hours.  Is pt taking a cab or transportation service? NO      If so will need to be accompanied by an adult too (friend/family member) in order for IV sedation to be given.    Per [facility] Policy:  Outpatients are to have responsible adult or family member to accompany them at discharge and drive them home. A service providing medically trained drivers or attendants would be acceptable. Public transportation would not be acceptable unless the patient is accompanied by a responsible adult or family member.  Is patient taking any blood thinners (i.e. plavix, coumadin, jantoven, warfarin, heparin, pradaxa or dabigatran, etc)? No   If YES, do NOT schedule, and route to RN pool     Is patient taking any GLP-1 Antagonist (hold needed for sedation patients only) No   (semaglutide (Ozempic, Wegovy), dulaglutide (Trulicity), exenatide ER (Bydureon), tirzepatide (Mounjaro), Liraglutide (Saxenda, Victoza), semaglutide (Rybelsus),Terzepatide (Zepbound)  If YES, okay to schedule AND route to RN nurse / Dr. Wang's Team    Does the patient have a bleeding or clotting disorder? No   If YES, it it OKAY to schedule AND route to RN pool  **For any patients with platelet count <100, must be forwarded to provider**    Is patient diabetic? No If YES, have them bring their  glucometer.    Does patient have an active infection or treated for one within the past week? No   If YES, do NOT schedule and route to RN nurse pool     Is patient currently taking any antibiotics?  No  For patients on chronic, preventative, or prophylactic antibiotics, procedures may be scheduled.   For patients on antibiotics for active or recent infection:antibiotic course must have been completed for 4 days    Is patient currently taking any steroid medications? (i.e. Prednisone, Medrol)  No   For patients on steroid medications, course must have been completed for 4 days    Is patient actively being treated for cancer or immunocompromised, including the spleen having been removed? No  If YES, do NOT schedule and route to RN pool     Any history of complications with sedation medications?  NO   If YES, OK to schedule AND route to RN pool     Any history of sleep apnea?  NO   If YES, OK to schedule AND route to RN pool     Any cardiac history?  NO   If YES, OK to schedule AND route to RN pool     Do you have an implanted pacemaker, ICD (implanted cardiac device) or AICD (automatic implanted cardiac device)?  NO  If YES, do NOT schedule AND route to RN pool (for all providers including Dr Waggoner)   Obtain name of device :     Obtain name of cardiologist:       Do you have an implanted stimulator?  NO  If YES, OK to schedule AND route to nursing.   Instruct patient to bring in the remote to the appointment and it will need to be turned off.  reviewed      Does patient have an allergy to contrast dye, iodine or shellfish?  No   If YES, OK to schedule. Route to RN pool AND add allergy information to appointment notes    Are you able to get on and off an exam table with minimal or no assistance? Yes   If NO, do NOT schedule and route to RN pool    Are you able to roll over and lay on your stomach with minimal or no assistance? Yes   If NO, do NOT schedule and route to RN pool    Reminders:  If you are  started on any steroids or antibiotics between now and your appointment, you must contact us because it may affect our ability to perform your procedure.  Yes  Informed patient that s/he needs to fast for 6 hours before procedure?  YES  Informed patient that it is OK to take normal medications with sips of water, especially blood pressure medications, before the procedure and must hold blood thinners as instructed.  Yes  Informed patient to arrive 30 minutes before procedure time to have an IV inserted.  reviewed   Do NOT schedule at 0745, 0815 or 1245.  reviewed   All radiofrequency ablations are in a 60 minute time slot.If cervical RFA, please schedule each side separate. If okay to do bilateral cervical RFA, schedule for 90 minutes.  reviewed

## 2024-12-17 NOTE — NURSING NOTE
Pre-procedure Intake  If YES to any questions or NO to having a   Please complete laminated checklist and leave on the computer keyboard for Provider, verbally inform provider if able.    For SCS Trial, RFA's or any sedation procedure:  Have you been fasting? Yes  If yes, for how long? 6 hrs     Are you taking any any blood thinners such as Coumadin, Warfarin, Jantoven, Pradaxa Xarelto, Eliquis, Edoxaban, Enoxaparin, Lovenox, Heparin, Arixtra, Fondaparinux, or Fragmin? OR Antiplatelet medication such as Plavix, Brilinta, or Effient?   No   If yes, when did you take your last dose?     Do you take aspirin?  No  If cervical procedure, have you held aspirin for 6 days?       Is the Pt taking any GLP-1 Antagonist (hold needed for sedation patients only)  (semaglutide (Ozempic, Wegovy), dulaglutide (Trulicity), exenatide ER (Bydureon), tirzepatide (Mounjaro), Liraglutide (Saxenda, Victoza), semaglutide (Rybelsus)     No   If yes, when did you take your last dose?     Do you have any allergies to contrast dye, iodine, steroid and/or numbing medications?  NO    Are you currently taking antibiotics or have an active infection?  NO    Have you had a fever/elevated temperature within the past week? NO    Are you currently taking oral steroids? NO    Do you have a ? Yes    Are you pregnant or breastfeeding?  NO    Have you received any vaccinations in the last week? NO    Notify provider and RNs if systolic BP >170, diastolic BP >100, P >100 or O2 sats < 90%

## 2024-12-17 NOTE — PATIENT INSTRUCTIONS
[facility]   Radiofrequency Ablation (RFA) Discharge Instructions     Today you saw: Dr. King Reynaga    You should anticipate procedural pain for up to 2 weeks.   It may take up to 8 weeks to receive relief from the RFA   Follow up with your provider in 6--8 weeks.   If you received sedation before, during or after your procedure, for the next 24 hours you shall NOT:    -Drive    -Operate machinery    -Drink alcohol    -Sign any legal documents   You may resume your normal diet   You may resume your regular medications after the procedure   If you were holding your blood thinning medication, please restart taking it: N/A  Be cautious. Numbness and/or weakness in the upper extremities may occur for up to 6-8 hours due to effect of local anesthetic  Avoid strenuous activity for the first 24 hours   You may resume your regular activities after 24 hours   You may shower, however no swimming, tub baths or hot tubs for 24 hours following your procedure   You may use ice packs 10-15 minutes three to four times a day at the injection site for comfort   Do not use heat to painful areas for 6 to 8 hours. This will give the local anesthetic time to wear off and prevent you from accidentally burning your skin.   Unless you have been directed to avoid the use of anti-inflammatory medications (NSAIDS), you may use medications such as ibuprofen, Aleve or Tylenol for pain control if needed.   If you experience any of the following, call the Pain Clinic during work hours (Monday through Friday 8 am-4:30 pm) at [phone] or the Provider Line after hours at [phone]:   -Fever over 100 degree F    -Swelling, bleeding, redness, drainage, warmth at the injection site    -Progressive weakness or numbness in your arms    -Loss of bowel or bladder function    -Unusual headache that is not relieved by Tylenol    -Unusual new onset of pain that is not improving

## 2024-12-19 NOTE — PROGRESS NOTES
Pre procedure Diagnosis: , Cervical spondylosis   Post procedure Diagnosis: Same  Procedure performed: cervical medial branch radiofrequency ablation bilateral at C3,4,5, fluoroscopically guided  Anesthesia:        22 gauge Peripheral IV inserted into left anticubital - attempts: 1 by Dr. Juhi PATEL Time IN: 1400   Sedation start time:  1401  Sedation end time:  1450     Medications given: fentanyl 50 mcg IV; versed 1 mg IV; toradol 0 mg IV        Complications: none  Operators: King Reynaga MD     Indications:   Radha Beckwith is a 49 year old female was sent for cervical facet procedures.  The patient has a history of chronic  neck pain without radiation.  Exam shows increased pain with extension and lateral rotation of the cervical spine and they have tried conservative treatment including physical therapy,medications, and previous interventional procedures. @ had a positive diagnostic cervical medial branch block that offered a significant reduction in her pain with improved cervical range of motion.     Options/alternatives, benefits and risks were discussed with the patient including but not limited to bleeding, infection, tissue trauma, exposure to radiation, reaction to medications, spinal cord injury, weakness, numbness and paralysis.  Questions were answered to her satisfaction and she wishes to proceed. Voluntary informed consent was obtained and signed.      BP (!) 147/88   Pulse 73   LMP  (LMP Unknown)   SpO2 95%        Procedure:  After obtaining signed, informed consent, the patient was taken to the procedure room and placed in the prone position on the [facility] frame.  A Pause for the Cause was completed prior to procedure start.  The patient was prepped and draped in the usual sterile fashion.   The areas of interest were identified with fluoroscopy on the left side.  The skin and subcutaneous tissues were anesthetized by injecting Lidocaine 1% 1 ml at each injection site utilizing  a 30 gauge 1 inch needle.  Then,  20 gauge 100 mm curved tip RF needles with 10 mm active tips were advanced tangential to the medial branch nerves, abutting the articular pillars of C3, C4, and C5    utilizing AP and Lateral fluoroscopy.  Aspiration was negative at all the levels.    Each level was then tested for motor and sensory stimulation, and positioned so that stimulation was negative for stimuli outside the immediate area of the desired lesion.  Sensory stimulation was completed at 50 Hz, with max stimulation up to 0.3V.  Motor stimulation was completed at 2Hz, up to 1.5V and was negative except for multifidus movement.       Each level was then injected with 0.5 ml of bupivacaine 0.25 %, and after allowing the local anesthetic to set up a 90 second, 80 degree Centigrade lesion was generated.  The electrodes were withdrawn slightly and the needles were rotated 180 degrees to widen the lesion area.  The electrodes were replaced and a second 80 degree 90 second lesion was generated.     Needles were then flushed with Lidocaine as they were removed and  hemostasis was achieved.    The procedure was then performed on the right at the same levels after positive sensory stimulation at 0.3 V or less and negative motor stimulation at 1.5V except for multifidus movement.  Each level was treated with two 90 second 80 degree Celsius lesions and the needles were flushed with local anesthetic as they were removed.  Hemostasis was achieved and bandaids placed where appropriate.     The patient tolerated the procedure well without complications and was taken to the recovery area for continued observation.  Fluoroscopic images were saved to PACS.     =     Post-procedure pain:  9/10         Follow-up includes:  - follow up with PCP and in the pain clinic as scheduled     King Reynaga MD  [facility] [place]

## 2024-12-23 ENCOUNTER — NURSE TRIAGE (OUTPATIENT)
Dept: NURSING | Facility: CLINIC | Age: 49
End: 2024-12-23
Payer: COMMERCIAL

## 2024-12-23 NOTE — TELEPHONE ENCOUNTER
"12/17/24 had cervical ablationcervical medial branch radiofrequency ablation bilateral at C3,4,5, fluoroscopically guided . Procedure itself was very painful, then numbness set in. Onset numbness to neck area about 48 hours ago, when laying on neck , seems like head \"falls asleep\". Increased pain to Lumbar spine down to sciatic over last couple days. Pain/numbness lower back, extends bilateral /numbness/sciatic area, is going down outside of thighs. Has loss of sensation to lower legs. (Also did develop Cold sx and fever about 72 hours ago , temp currently 99.1, No SOB)     Protocol reviewed, DISPOSITION: See in office today. Office is now closed,and is betra holidays,  Patient will go to [facility] ED so imaging can be done, Will f/u with Pain medicine department via \"my chart\". Call back with further questions/concerns.     Kina KAM RN  P Central Nursing/Red Flag Triage & Med Refill Team  Reason for Disposition   Numbness in a leg or foot (i.e., loss of sensation)    Additional Information   Negative: Passed out (e.g., fainted, lost consciousness, blacked out and was not responding)   Negative: Shock suspected (e.g., cold/pale/clammy skin, too weak to stand, low BP, rapid pulse)   Negative: Sounds like a life-threatening emergency to the triager   Negative: Pain in the upper back over the ribs (rib cage) that radiates (travels) into the chest   Negative: Major injury to the back (e.g., MVA, fall > 10 feet or 3 meters, penetrating injury, etc.)   Negative: Pain in the upper back over the ribs (rib cage) and worsened by coughing (or clearly increases with breathing)   Negative: Back pain during pregnancy   Negative: SEVERE back pain of sudden onset and age > 60 years   Negative: SEVERE abdominal pain (e.g., excruciating)   Negative: Abdominal pain and age > 60 years   Negative: Unable to urinate (or only a few drops) and bladder feels very full   Negative: Loss of bladder or bowel control (urine or bowel " incontinence; wetting self, leaking stool) of new-onset   Negative: Numbness (loss of sensation) in groin or rectal area   Negative: Pain radiates into groin, scrotum   Negative: Blood in urine (red, pink, or tea-colored)   Negative: Vomiting and pain over lower ribs of back (i.e., flank - kidney area)   Negative: Weakness of a leg or foot (e.g., unable to bear weight, dragging foot)   Negative: Patient sounds very sick or weak to the triager   Negative: Fever > 100.4 F (38.0 C) and flank pain   Negative: Pain or burning with passing urine (urination)   Negative: SEVERE back pain (e.g., excruciating, unable to do any normal activities) and not improved after pain medicine and CARE ADVICE   Negative: Numbness in an arm or hand (i.e., loss of sensation) and upper back pain    Protocols used: Back Pain-A-[place]

## 2024-12-25 ENCOUNTER — HOSPITAL ENCOUNTER (EMERGENCY)
Facility: CLINIC | Age: 49
Discharge: HOME | End: 2024-12-26
Attending: FAMILY MEDICINE
Payer: COMMERCIAL

## 2024-12-25 DIAGNOSIS — J11.1 INFLUENZA-LIKE ILLNESS: ICD-10-CM

## 2024-12-25 DIAGNOSIS — R19.7 DIARRHEA, UNSPECIFIED TYPE: ICD-10-CM

## 2024-12-25 DIAGNOSIS — G89.29 ACUTE EXACERBATION OF CHRONIC LOW BACK PAIN: ICD-10-CM

## 2024-12-25 DIAGNOSIS — M54.2 CHRONIC NECK PAIN: ICD-10-CM

## 2024-12-25 DIAGNOSIS — G89.29 CHRONIC NECK PAIN: ICD-10-CM

## 2024-12-25 DIAGNOSIS — M54.50 ACUTE EXACERBATION OF CHRONIC LOW BACK PAIN: ICD-10-CM

## 2024-12-25 LAB — GLUCOSE BLDC GLUCOMTR-MCNC: 142 MG/DL (ref 70–99)

## 2024-12-25 PROCEDURE — 87637 SARSCOV2&INF A&B&RSV AMP PRB: CPT | Performed by: FAMILY MEDICINE

## 2024-12-25 PROCEDURE — 82310 ASSAY OF CALCIUM: CPT | Performed by: FAMILY MEDICINE

## 2024-12-25 PROCEDURE — 99284 EMERGENCY DEPT VISIT MOD MDM: CPT | Mod: 25 | Performed by: FAMILY MEDICINE

## 2024-12-25 PROCEDURE — 80053 COMPREHEN METABOLIC PANEL: CPT | Performed by: FAMILY MEDICINE

## 2024-12-25 PROCEDURE — 36415 COLL VENOUS BLD VENIPUNCTURE: CPT | Performed by: FAMILY MEDICINE

## 2024-12-25 PROCEDURE — 82962 GLUCOSE BLOOD TEST: CPT

## 2024-12-25 PROCEDURE — 86140 C-REACTIVE PROTEIN: CPT | Performed by: FAMILY MEDICINE

## 2024-12-25 PROCEDURE — 96375 TX/PRO/DX INJ NEW DRUG ADDON: CPT | Performed by: FAMILY MEDICINE

## 2024-12-25 PROCEDURE — 99284 EMERGENCY DEPT VISIT MOD MDM: CPT | Performed by: FAMILY MEDICINE

## 2024-12-25 PROCEDURE — 85025 COMPLETE CBC W/AUTO DIFF WBC: CPT | Performed by: FAMILY MEDICINE

## 2024-12-25 PROCEDURE — 250N000011 HC RX IP 250 OP 636: Performed by: FAMILY MEDICINE

## 2024-12-25 RX ORDER — KETOROLAC TROMETHAMINE 30 MG/ML
30 INJECTION, SOLUTION INTRAMUSCULAR; INTRAVENOUS ONCE
Status: COMPLETED | OUTPATIENT
Start: 2024-12-25 | End: 2024-12-25

## 2024-12-25 RX ADMIN — KETOROLAC TROMETHAMINE 30 MG: 30 INJECTION, SOLUTION INTRAMUSCULAR at 23:52

## 2024-12-26 ENCOUNTER — APPOINTMENT (OUTPATIENT)
Dept: GENERAL RADIOLOGY | Facility: CLINIC | Age: 49
End: 2024-12-26
Attending: FAMILY MEDICINE
Payer: COMMERCIAL

## 2024-12-26 VITALS
TEMPERATURE: 98 F | DIASTOLIC BLOOD PRESSURE: 65 MMHG | RESPIRATION RATE: 20 BRPM | OXYGEN SATURATION: 90 % | SYSTOLIC BLOOD PRESSURE: 110 MMHG | HEART RATE: 72 BPM

## 2024-12-26 LAB
ALBUMIN SERPL BCG-MCNC: 4.4 G/DL (ref 3.5–5.2)
ALP SERPL-CCNC: 90 U/L (ref 40–150)
ALT SERPL W P-5'-P-CCNC: 20 U/L (ref 0–50)
ANION GAP SERPL CALCULATED.3IONS-SCNC: 10 MMOL/L (ref 7–15)
AST SERPL W P-5'-P-CCNC: 30 U/L (ref 0–45)
BASOPHILS # BLD AUTO: 0.1 10E3/UL (ref 0–0.2)
BASOPHILS NFR BLD AUTO: 1 %
BILIRUB SERPL-MCNC: <0.2 MG/DL
BUN SERPL-MCNC: 14.6 MG/DL (ref 6–20)
CALCIUM SERPL-MCNC: 8.7 MG/DL (ref 8.8–10.4)
CHLORIDE SERPL-SCNC: 99 MMOL/L (ref 98–107)
CREAT SERPL-MCNC: 1.03 MG/DL (ref 0.51–0.95)
CRP SERPL-MCNC: 9.69 MG/L
EGFRCR SERPLBLD CKD-EPI 2021: 66 ML/MIN/1.73M2
EOSINOPHIL # BLD AUTO: 0.1 10E3/UL (ref 0–0.7)
EOSINOPHIL NFR BLD AUTO: 2 %
ERYTHROCYTE [DISTWIDTH] IN BLOOD BY AUTOMATED COUNT: 13.5 % (ref 10–15)
FLUAV RNA SPEC QL NAA+PROBE: NEGATIVE
FLUBV RNA RESP QL NAA+PROBE: NEGATIVE
GLUCOSE SERPL-MCNC: 109 MG/DL (ref 70–99)
HCO3 SERPL-SCNC: 28 MMOL/L (ref 22–29)
HCT VFR BLD AUTO: 38 % (ref 35–47)
HGB BLD-MCNC: 13.1 G/DL (ref 11.7–15.7)
IMM GRANULOCYTES # BLD: 0 10E3/UL
IMM GRANULOCYTES NFR BLD: 0 %
LYMPHOCYTES # BLD AUTO: 2.9 10E3/UL (ref 0.8–5.3)
LYMPHOCYTES NFR BLD AUTO: 39 %
MCH RBC QN AUTO: 30.5 PG (ref 26.5–33)
MCHC RBC AUTO-ENTMCNC: 34.5 G/DL (ref 31.5–36.5)
MCV RBC AUTO: 89 FL (ref 78–100)
MONOCYTES # BLD AUTO: 0.6 10E3/UL (ref 0–1.3)
MONOCYTES NFR BLD AUTO: 8 %
NEUTROPHILS # BLD AUTO: 3.8 10E3/UL (ref 1.6–8.3)
NEUTROPHILS NFR BLD AUTO: 51 %
NRBC # BLD AUTO: 0 10E3/UL
NRBC BLD AUTO-RTO: 0 /100
PLAT MORPH BLD: NORMAL
PLATELET # BLD AUTO: 188 10E3/UL (ref 150–450)
POTASSIUM SERPL-SCNC: 4.3 MMOL/L (ref 3.4–5.3)
PROT SERPL-MCNC: 7.1 G/DL (ref 6.4–8.3)
RBC # BLD AUTO: 4.29 10E6/UL (ref 3.8–5.2)
RBC MORPH BLD: NORMAL
RSV RNA SPEC NAA+PROBE: NEGATIVE
SARS-COV-2 RNA RESP QL NAA+PROBE: NEGATIVE
SODIUM SERPL-SCNC: 137 MMOL/L (ref 135–145)
WBC # BLD AUTO: 7.4 10E3/UL (ref 4–11)

## 2024-12-26 PROCEDURE — 71045 X-RAY EXAM CHEST 1 VIEW: CPT

## 2024-12-26 PROCEDURE — 96375 TX/PRO/DX INJ NEW DRUG ADDON: CPT | Performed by: FAMILY MEDICINE

## 2024-12-26 PROCEDURE — 96365 THER/PROPH/DIAG IV INF INIT: CPT | Performed by: FAMILY MEDICINE

## 2024-12-26 PROCEDURE — 258N000003 HC RX IP 258 OP 636: Performed by: FAMILY MEDICINE

## 2024-12-26 PROCEDURE — 250N000011 HC RX IP 250 OP 636: Performed by: FAMILY MEDICINE

## 2024-12-26 RX ORDER — ORPHENADRINE CITRATE 30 MG/ML
60 INJECTION INTRAMUSCULAR; INTRAVENOUS ONCE
Status: COMPLETED | OUTPATIENT
Start: 2024-12-26 | End: 2024-12-26

## 2024-12-26 RX ORDER — MAGNESIUM SULFATE 1 G/100ML
1 INJECTION INTRAVENOUS ONCE
Status: COMPLETED | OUTPATIENT
Start: 2024-12-26 | End: 2024-12-26

## 2024-12-26 RX ADMIN — MAGNESIUM SULFATE HEPTAHYDRATE 1 G: 1 INJECTION, SOLUTION INTRAVENOUS at 01:11

## 2024-12-26 RX ADMIN — SODIUM CHLORIDE 500 ML: 9 INJECTION, SOLUTION INTRAVENOUS at 01:11

## 2024-12-26 RX ADMIN — ORPHENADRINE CITRATE 60 MG: 60 INJECTION INTRAMUSCULAR; INTRAVENOUS at 01:14

## 2024-12-26 ASSESSMENT — ACTIVITIES OF DAILY LIVING (ADL)
ADLS_ACUITY_SCORE: 43
ADLS_ACUITY_SCORE: 43

## 2024-12-26 NOTE — ED PROVIDER NOTES
[facility] ED Provider Note   Patient: Radha Beckwith  MRN #:  [id number]  Date of Visit: December 25, 2024    CC:     Chief Complaint   Patient presents with    Headache    Facial Numbness     HPI:  Radha Beckwith is a 49 year old female who presented to the emergency department with recent radiofrequency ablation of cervical spine due to arthropathy.  This occurred on December 19.  Patient states that she started to develop some numbness and discomfort at the base of her neck 2 days ago.  She has had low-grade fever, and cough with increased neck and back pain.  Pain across the back is worse than her baseline with some radiation down the right leg.  She also has some radicular pain into the right upper arm.  She had a headache and dizziness today.  She called into the nurse triage line following her radiofrequency ablation and was told that she should come in 2 days ago.  She tried to wait to see if things would get better and came in tonight because she had more concerns because of the combination of different things that are going on.  She denies any unilateral weakness, vision problems, speech difficulty.  She reported that she was in the bathroom for 3 hours having diarrhea earlier in the day.    Problem List:  Patient Active Problem List    Diagnosis Date Noted    Cervicalgia 04/02/2024     Priority: Medium    Cervical radiculopathy 02/14/2024     Priority: Medium    Chronic bilateral thoracic back pain 02/14/2024     Priority: Medium    Lumbar radiculopathy 02/14/2024     Priority: Medium    Pain of both sacroiliac joints 02/14/2024     Priority: Medium    Radicular pain of upper extremity 02/14/2024     Priority: Medium    Somatic dysfunction of pelvic region 01/10/2024     Priority: Medium    Myofascial pain 01/10/2024     Priority: Medium    Generalized anxiety disorder 11/27/2023     Priority: Medium    Chronic low back pain  with bilateral sciatica 06/15/2023     Priority: Medium    Chronic neck pain 06/15/2023     Priority: Medium    Moderate episode of recurrent major depressive disorder (H) 06/29/2022     Priority: Medium    Supraventricular tachycardia (H) 06/03/2019     Priority: Medium    Psychophysiological insomnia 12/30/2017     Priority: Medium    Chronic bilateral low back pain without sciatica 12/30/2017     Priority: Medium    Opioid dependence in remission (H) 08/02/2015     Priority: Medium     On suboxone treatement with Bryant Harkins.  (Noted in 2015).        Anxiety attack 07/31/2015     Priority: Medium    Hypokalemia 06/23/2015     Priority: Medium    Tobacco abuse 06/23/2015     Priority: Medium    Hyperlipidemia LDL goal <100 01/29/2014     Priority: Medium    GERD (gastroesophageal reflux disease) 07/28/2010     Priority: Medium     Takes omeprazole and metoclopramide      Restless legs 07/28/2010     Priority: Medium       Past Medical History:   Diagnosis Date    Abdominal pain, right lower quadrant 03/09/2008    Anxiety attack 07/31/2015    Atypical chest pain 06/23/2015    De Quervain's disease (tenosynovitis)     Dehydration     Depressive disorder 1996    Gastric ulcer 07/31/2015    GERD (gastroesophageal reflux disease) 07/28/2010    Hypertension 2017    Ingrowing nail 01/09/2014    Migraines     Opioid dependence in remission (H) 08/02/2015    Other and unspecified ovarian cyst     Papanicolaou smear of cervix with low grade squamous intraepithelial lesion (LGSIL) 07/07/2017       MEDS: acetaminophen (TYLENOL) 500 MG tablet  ALPRAZolam (XANAX) 0.5 MG tablet  baclofen (LIORESAL) 20 MG tablet  bisacodyl (DULCOLAX) 5 MG EC tablet  buprenorphine HCl-naloxone HCl (SUBOXONE) 2-0.5 MG per film  busPIRone HCl (BUSPAR) 30 MG tablet  cyclobenzaprine (FLEXERIL) 5 MG tablet  diclofenac (VOLTAREN) 1 % topical gel  DULoxetine (CYMBALTA) 60 MG capsule  famotidine (PEPCID) 40 MG tablet  hyoscyamine  (LEVSIN) 0.125 MG tablet  hyoscyamine (LEVSIN/SL) 0.125 MG sublingual tablet  lactulose (CONSTULOSE) 10 GM/15ML solution  linaclotide (LINZESS) 72 MCG capsule  medical cannabis (Patient's own supply)  ondansetron (ZOFRAN ODT) 4 MG ODT tab  pantoprazole (PROTONIX) 40 MG EC tablet  rOPINIRole (REQUIP) 1 MG tablet  simvastatin (ZOCOR) 10 MG tablet  temazepam (RESTORIL) 15 MG capsule        ALLERGIES:    Allergies   Allergen Reactions    Compazine Anxiety and Palpitations     Severe anxiety attack    Droperidol Anxiety and Palpitations     Severe anxiety attack    Nubain [Nalbuphine Hcl] Anxiety and Palpitations     Severe anxiety attack    Ergotamine-Caffeine      12-            GI problems-    Seasonal Allergies     Sumatriptan      vomits after giving herself a shot    Prochlorperazine Anxiety and Palpitations     Uncontrolled movement, severe anxiety attack       Past Surgical History:   Procedure Laterality Date    BIOPSY CERVICAL, LOCAL EXCISION, SINGLE/MULTIPLE N/A 8/10/2017    Procedure: BIOPSY CERVICAL, LOCAL EXCISION, SINGLE/MULTIPLE;;  Surgeon: Michael Chandler MD;  [facility]    COLONOSCOPY N/A 1/6/2023    Procedure: COLONOSCOPY;  Surgeon: Michael Dozier MD;  [facility]    COLPOSCOPY, BIOPSY, COMBINED N/A 8/10/2017    Procedure: COMBINED COLPOSCOPY, BIOPSY;  Colposcopy with Cervical Biopsies and Endometrial Biopsy, Exam with Ultrasound;  Surgeon: Michael Chandler MD;  Location[facility]    ESOPHAGOSCOPY, GASTROSCOPY, DUODENOSCOPY (EGD), COMBINED N/A 4/17/2017    Procedure: COMBINED ESOPHAGOSCOPY, GASTROSCOPY, DUODENOSCOPY (EGD);  Surgeon: Ibrahima Esposito MD;  [facility]    ESOPHAGOSCOPY, GASTROSCOPY, DUODENOSCOPY (EGD), COMBINED N/A 1/6/2023    Procedure: ESOPHAGOGASTRODUODENOSCOPY, WITH BIOPSY;  Surgeon: Michael Dozier MD;  [facility]    ESOPHAGOSCOPY, GASTROSCOPY, DUODENOSCOPY (EGD), COMBINED N/A 10/3/2023    Procedure: ESOPHAGOGASTRODUODENOSCOPY, WITH  BIOPSY;  Surgeon: John Paul Varela MD;  [facility]    EXAM UNDER ANESTHESIA PELVIC N/A 8/10/2017    Procedure: EXAM UNDER ANESTHESIA PELVIC;;  Surgeon: Mihcael Chandler MD;  [facility]    HERNIORRHAPHY, INCISIONAL, ROBOT-ASSISTED, LAPAROSCOPIC, USING DA JERRELL XI N/A 8/30/2024    Procedure: HERNIORRHAPHY, INCISIONAL, ROBOT-ASSISTED, LAPAROSCOPIC, USING DA JERRELL XI;  Surgeon: Michael Dozier MD;  [facility]    HYSTERECTOMY      HYSTERECTOMY, PAP NO LONGER INDICATED      INJECT EPIDURAL CERVICAL N/A 10/20/2023    Procedure: Cervical 6-7 Interlaminar epidural steroid injection using fluoroscopic guidance with contrast dye.;  Surgeon: Trevor Ivory MD;  [facility]    INJECT EPIDURAL CERVICAL N/A 4/19/2024    Procedure: Cervical 6 - Cervical 7 Interlaminar epidural steroid injection using fluoroscopic guidance with contrast dye.;  Surgeon: Trevor Ivory MD;  [facility]    INJECT EPIDURAL LUMBAR Bilateral 7/1/2022    Procedure: Lumbar 5-Sacral 1 Transforaminal Epidural Steroid Injection with fluoroscopic guidance and contrast, bilateral;  Surgeon: Trevor Ivory MD;  [facility]    LAPAROSCOPIC CHOLECYSTECTOMY N/A 2/2/2018    Procedure: LAPAROSCOPIC CHOLECYSTECTOMY;  Laparoscopic Cholecystectomy;  Surgeon: Tigre Lowry DO;  [facility]    LAPAROSCOPIC HYSTERECTOMY TOTAL N/A 10/30/2017    Procedure: LAPAROSCOPIC HYSTERECTOMY TOTAL;  LAPAROSCOPIC HYSTERECTOMY TOTAL POSSIBLE SALPINGO-OOPHERECTOMY (BILATERAL);  Surgeon: Michael Chandler MD;  Location: [facility]    ZZHC UGI ENDOSCOPY, SIMPLE EXAM  01/07/08       Social History     Tobacco Use    Smoking status: Every Day     Current packs/day: 0.25     Average packs/day: 0.3 packs/day for 20.0 years (5.0 ttl pk-yrs)     Types: Cigarettes     Passive exposure: Never    Smokeless tobacco: Never   Vaping Use    Vaping status: Former    Substances: Nicotine, CBD    Devices: [employer]   Substance Use Topics     Alcohol use: Yes     Comment: Occasionaly    Drug use: No         Review of Systems   Except as noted in HPI, all other systems were reviewed and are negative    Physical Exam   Vitals were reviewed  Patient Vitals for the past 8 hrs:   BP Temp Pulse Resp SpO2   12/26/24 0258 -- -- 72 20 --   12/26/24 0230 110/65 -- 72 -- 90 %   12/26/24 0130 111/66 -- 75 -- 97 %   12/25/24 2332 (!) 146/93 -- -- -- --   12/25/24 2330 -- 98  F (36.7  C) 95 18 100 %     GENERAL APPEARANCE: Alert and oriented x 3, slightly slow to respond  FACE: normal facies; no asymmetry  EYES: Pupils are equal; reactive to light, extraocular muscles are intact  HENT: normal external exam; edentulous,  NECK: Some tenderness at the base of the neck.  RESP: normal respiratory effort; clear breath sounds bilaterally  CV: regular rate and rhythm; no significant murmurs, gallops or rubs  ABD: soft, no tenderness; no rebound or guarding; bowel sounds are normal  MS: Tenderness across the low back region.  Some tenderness over the neck region as well.  EXT: No calf tenderness or pitting edema  SKIN: no worrisome rash; no redness or increased warmth around the neck or shoulder  NEURO: no facial droop; no focal deficits, speech is pressured        Available Lab/Imaging Results     Results for orders placed or performed during the hospital encounter of 12/25/24 (from the past 24 hours)   Glucose by meter   Result Value Ref Range    GLUCOSE BY METER POCT 142 (H) 70 - 99 mg/dL   Influenza A/B, RSV and SARS-CoV2 PCR (COVID-19) Nasopharyngeal    Specimen: Nasopharyngeal; Swab   Result Value Ref Range    Influenza A PCR Negative Negative    Influenza B PCR Negative Negative    RSV PCR Negative Negative    SARS CoV2 PCR Negative Negative    Narrative    Testing was performed using the Xpert Xpress CoV2/Flu/RSV Assay on the [employer] Instrument. This test should be ordered for the detection of SARS-CoV2, influenza, and RSV viruses in individuals with signs  and symptoms of respiratory tract infection. This test is for in vitro diagnostic use under the [place] FDA for laboratories certified under CLIA to perform high or moderate complexity testing. This test has been [place] FDA cleared. A negative result does not rule out the presence of PCR inhibitors in the specimen or target RNA in concentration below the limit of detection for the assay. If only one viral target is positive but coinfection with multiple targets is suspected, the sample should be re-tested with another FDA cleared, approved, or authorized test, if coninfection would change clinical management. This test was validated by the [facility]. These laboratories are certified under the Clinical Laboratory Improvement Amendments of 1988 (CLIA-88) as qualified to perfom high complexity laboratory testing.   CBC with platelets differential    Narrative    The following orders were created for panel order CBC with platelets differential.  Procedure                               Abnormality         Status                     ---------                               -----------         ------                     CBC with platelets and d...[[id number]]                      Final result               RBC and Platelet Morphology[[id number]]                      Final result                 Please view results for these tests on the individual orders.   Comprehensive metabolic panel   Result Value Ref Range    Sodium 137 135 - 145 mmol/L    Potassium 4.3 3.4 - 5.3 mmol/L    Carbon Dioxide (CO2) 28 22 - 29 mmol/L    Anion Gap 10 7 - 15 mmol/L    Urea Nitrogen 14.6 6.0 - 20.0 mg/dL    Creatinine 1.03 (H) 0.51 - 0.95 mg/dL    GFR Estimate 66 >60 mL/min/1.73m2    Calcium 8.7 (L) 8.8 - 10.4 mg/dL    Chloride 99 98 - 107 mmol/L    Glucose 109 (H) 70 - 99 mg/dL    Alkaline Phosphatase 90 40 - 150 U/L    AST 30 0 - 45 U/L    ALT 20 0 - 50 U/L    Protein Total 7.1 6.4 - 8.3 g/dL    Albumin 4.4 3.5 - 5.2 g/dL     Bilirubin Total <0.2 <=1.2 mg/dL   CRP inflammation   Result Value Ref Range    CRP Inflammation 9.69 (H) <5.00 mg/L   CBC with platelets and differential   Result Value Ref Range    WBC Count 7.4 4.0 - 11.0 10e3/uL    RBC Count 4.29 3.80 - 5.20 10e6/uL    Hemoglobin 13.1 11.7 - 15.7 g/dL    Hematocrit 38.0 35.0 - 47.0 %    MCV 89 78 - 100 fL    MCH 30.5 26.5 - 33.0 pg    MCHC 34.5 31.5 - 36.5 g/dL    RDW 13.5 10.0 - 15.0 %    Platelet Count 188 150 - 450 10e3/uL    % Neutrophils 51 %    % Lymphocytes 39 %    % Monocytes 8 %    % Eosinophils 2 %    % Basophils 1 %    % Immature Granulocytes 0 %    NRBCs per 100 WBC 0 <1 /100    Absolute Neutrophils 3.8 1.6 - 8.3 10e3/uL    Absolute Lymphocytes 2.9 0.8 - 5.3 10e3/uL    Absolute Monocytes 0.6 0.0 - 1.3 10e3/uL    Absolute Eosinophils 0.1 0.0 - 0.7 10e3/uL    Absolute Basophils 0.1 0.0 - 0.2 10e3/uL    Absolute Immature Granulocytes 0.0 <=0.4 10e3/uL    Absolute NRBCs 0.0 10e3/uL   RBC and Platelet Morphology   Result Value Ref Range    RBC Morphology Confirmed RBC Indices     Platelet Assessment  Automated Count Confirmed. Platelet morphology is normal.     Automated Count Confirmed. Platelet morphology is normal.   XR Chest Port 1 View    Narrative    EXAM: XR CHEST PORT 1 VIEW  LOCATION: [facility]  DATE: 12/26/2024    INDICATION: Cough, body aches, headache; rule out pneumonia  COMPARISON: 5/13/2024      Impression    IMPRESSION: Negative chest. 3 mm nodule in the right midlung is stable, most compatible with a calcified granuloma.     *Note: Due to a large number of results and/or encounters for the requested time period, some results have not been displayed. A complete set of results can be found in Results Review.                Impression     Final diagnoses:   Influenza-like illness   Acute exacerbation of chronic low back pain   Diarrhea   acute on chronic neck pain         ED Course & Medical Decision Making   Radha MASSEY  Amena is a 49 year old female who presented to the emergency department with various symptoms following a recent radiofrequency ablation procedure.  Patient had spondylosis/arthropathy of her cervical spine.  She underwent a procedure in December 19.  She started develop some numbness and discomfort of the base of the neck, lower than where her levels were treated.  She reports 2 days of low-grade fever, cough, increased neck and back pain, and several hours of diarrhea.  She is also experiencing some headaches and generalized aching pain.    Vital signs reveal a temp of 98 degrees, blood pressure 110/65, heart rate of 72, respiration 20, 90% oxygen saturation.  On exam, patient was initially groggy and slow to respond.  She had no acute neurologic symptoms.  She had some tenderness to the base of the neck but no redness or rash.  The injection sites from her radiofrequency ablation is not visible.  Patient has good range of motion.  She felt some pain going down her right arm and right leg.  Pain in the back was across the low back and she reported that the pain was worse than her usual baseline.  She did not have any further episodes of diarrhea while in the ED.  She was initially treated with Toradol which reportedly did not help with her pain.  Her workup reveals a normal CBC, comprehensive metabolic panel except for slight elevation of her creatinine at 1.03, and glucose of 109.  Normal liver enzymes.  Her CRP is elevated 9.69.  Chest x-ray revealed no acute cardiopulmonary findings.  Patient's multiplex PCR for influenza, RSV and COVID are negative.  Patient's elevation of her creatinine may be due to underlying dehydration from her 3 hours of diarrhea.      Patient received the following medications.    Medications   ketorolac (TORADOL) injection 30 mg (30 mg Intravenous $Given 12/25/24 4993)   sodium chloride 0.9% BOLUS 500 mL (0 mLs Intravenous Stopped 12/26/24 4011)   magnesium sulfate 1 g in 100 mL  D5W intermittent infusion (0 g Intravenous Stopped 12/26/24 0147)   orphenadrine (NORFLEX) injection 60 mg (60 mg Intravenous $Given 12/26/24 0114)     2:45 AM: Patient states that she is feeling better.  She thinks the IV fluids have helped quite a bit.  Patient likely has a combination of post radiofrequency ablation procedure, pancolitis, and an underlying flulike illness contributing to her symptoms.  Her symptoms do not point to an emergent condition.  I asked her to follow-up with her pain clinic to see if some of those symptoms that she is describing might be due a new cervical radiculopathy.     Patient felt comfortable returning home.  I asked her to follow-up with her pain clinic to see if some of those symptoms could be attributed to her recent procedure.  See discharge instructions below.        Written after-visit summary and instructions were given at the time of discharge.      Discharge Instructions:   We do not find a worrisome cause for your fever and right sided arm and leg pain.  There are no signs of infection at this time.  Your symptoms may be a combination of your recent radiofrequency ablation, pancolitis, and an underlying flulike illness.  Continue sips of clear liquids to stay hydrated.  Take your routine pain medications at home.  Follow-up with your pain specialist in 3 to 5 days.       Disclaimer: This note consists of words and symbols derived from keyboarding and dictation using voice recognition software.  As a result, there may be errors that have gone undetected.  Please consider this when interpreting information found in this note.       Ronda Luciano MD  12/26/24 [id number]

## 2024-12-26 NOTE — ED TRIAGE NOTES
Pt presents with concern for a knot on her back, low back pain and right arm and leg tingling. Pt also reports dizziness and a headache. Pt had a cervical neck ablation last week Tuesday. The sore neck. Pt reports face numbess started an hour ago.

## 2024-12-26 NOTE — DISCHARGE INSTRUCTIONS
We do not find a worrisome cause for your fever and right sided arm and leg pain.  There are no signs of infection at this time.  Your symptoms may be a combination of your recent radiofrequency ablation, pancolitis, and an underlying flulike illness.  Continue sips of clear liquids to stay hydrated.  Take your routine pain medications at home.  Follow-up with your pain specialist in 3 to 5 days.

## 2024-12-27 PROBLEM — M62.838 MUSCLE SPASM: Status: ACTIVE | Noted: 2024-12-27

## 2024-12-30 ENCOUNTER — PATIENT OUTREACH (OUTPATIENT)
Dept: CARE COORDINATION | Facility: CLINIC | Age: 49
End: 2024-12-30
Payer: COMMERCIAL

## 2025-01-02 ENCOUNTER — MYC MEDICAL ADVICE (OUTPATIENT)
Dept: FAMILY MEDICINE | Facility: CLINIC | Age: 50
End: 2025-01-02

## 2025-01-06 ENCOUNTER — VIRTUAL VISIT (OUTPATIENT)
Dept: PSYCHOLOGY | Facility: CLINIC | Age: 50
End: 2025-01-06
Payer: COMMERCIAL

## 2025-01-06 DIAGNOSIS — F33.1 MODERATE EPISODE OF RECURRENT MAJOR DEPRESSIVE DISORDER (H): Primary | ICD-10-CM

## 2025-01-06 DIAGNOSIS — F41.1 GENERALIZED ANXIETY DISORDER: ICD-10-CM

## 2025-01-06 PROCEDURE — 90834 PSYTX W PT 45 MINUTES: CPT | Mod: 95 | Performed by: COUNSELOR

## 2025-01-06 ASSESSMENT — ANXIETY QUESTIONNAIRES
GAD7 TOTAL SCORE: 21
GAD7 TOTAL SCORE: 21
1. FEELING NERVOUS, ANXIOUS, OR ON EDGE: NEARLY EVERY DAY
IF YOU CHECKED OFF ANY PROBLEMS ON THIS QUESTIONNAIRE, HOW DIFFICULT HAVE THESE PROBLEMS MADE IT FOR YOU TO DO YOUR WORK, TAKE CARE OF THINGS AT HOME, OR GET ALONG WITH OTHER PEOPLE: EXTREMELY DIFFICULT
3. WORRYING TOO MUCH ABOUT DIFFERENT THINGS: NEARLY EVERY DAY
7. FEELING AFRAID AS IF SOMETHING AWFUL MIGHT HAPPEN: NEARLY EVERY DAY
5. BEING SO RESTLESS THAT IT IS HARD TO SIT STILL: NEARLY EVERY DAY
6. BECOMING EASILY ANNOYED OR IRRITABLE: NEARLY EVERY DAY
3. WORRYING TOO MUCH ABOUT DIFFERENT THINGS: NEARLY EVERY DAY
7. FEELING AFRAID AS IF SOMETHING AWFUL MIGHT HAPPEN: NEARLY EVERY DAY
1. FEELING NERVOUS, ANXIOUS, OR ON EDGE: NEARLY EVERY DAY
IF YOU CHECKED OFF ANY PROBLEMS ON THIS QUESTIONNAIRE, HOW DIFFICULT HAVE THESE PROBLEMS MADE IT FOR YOU TO DO YOUR WORK, TAKE CARE OF THINGS AT HOME, OR GET ALONG WITH OTHER PEOPLE: EXTREMELY DIFFICULT
2. NOT BEING ABLE TO STOP OR CONTROL WORRYING: NEARLY EVERY DAY
8. IF YOU CHECKED OFF ANY PROBLEMS, HOW DIFFICULT HAVE THESE MADE IT FOR YOU TO DO YOUR WORK, TAKE CARE OF THINGS AT HOME, OR GET ALONG WITH OTHER PEOPLE?: EXTREMELY DIFFICULT
GAD7 TOTAL SCORE: 21
8. IF YOU CHECKED OFF ANY PROBLEMS, HOW DIFFICULT HAVE THESE MADE IT FOR YOU TO DO YOUR WORK, TAKE CARE OF THINGS AT HOME, OR GET ALONG WITH OTHER PEOPLE?: EXTREMELY DIFFICULT
2. NOT BEING ABLE TO STOP OR CONTROL WORRYING: NEARLY EVERY DAY
7. FEELING AFRAID AS IF SOMETHING AWFUL MIGHT HAPPEN: NEARLY EVERY DAY
4. TROUBLE RELAXING: NEARLY EVERY DAY
4. TROUBLE RELAXING: NEARLY EVERY DAY
5. BEING SO RESTLESS THAT IT IS HARD TO SIT STILL: NEARLY EVERY DAY
6. BECOMING EASILY ANNOYED OR IRRITABLE: NEARLY EVERY DAY
GAD7 TOTAL SCORE: 21
GAD7 TOTAL SCORE: 21

## 2025-01-06 NOTE — PROGRESS NOTES
Answers submitted by the patient for this visit:  Patient Health Questionnaire (Submitted on 1/6/2025)  If you checked off any problems, how difficult have these problems made it for you to do your work, take care of things at home, or get along with other people?: Extremely difficult  PHQ9 TOTAL SCORE: 24  Patient Health Questionnaire (G7) (Submitted on 1/6/2025)  BO 7 TOTAL SCORE: 21          [facility]                                     Progress Note    Patient Name: Radha Beckwith  Date: 1/6/25         Service Type: Individual      Session Start Time: 3:35pm Session End Time: 4:20pm     Session Length:  45    Session #: 63    Attendees: Client    Service Modality:  video      Provider verified identity through the following two step process.  Patient provided:  Patient is known previously to provider    Telemedicine Visit: The patient's condition can be safely assessed and treated via synchronous audio and visual telemedicine encounter.      Reason for Telemedicine Visit: Patient convenience (e.g. access to timely appointments / distance to available provider)    Originating Site (Patient Location): Patient's home    Distant Site (Provider Location): Provider Remote Setting- Home Office    Consent:  The patient/guardian has verbally consented to: the potential risks and benefits of telemedicine (video visit) versus in person care; bill my insurance or make self-payment for services provided; and responsibility for payment of non-covered services.     Patient would like the video invitation sent by:  My Chart    Mode of Communication:  video    Distant Location (Provider):  Off-site home office    As the provider I attest to compliance with applicable laws and regulations related to telemedicine.    DATA  Interactive Complexity: No  Crisis: No        Progress Since Last Session (Related to Symptoms / Goals / Homework):   Symptoms: No change .    Homework: Completed in session      Episode  of Care Goals: Satisfactory progress - MAINTENANCE (Working to maintain change, with risk of relapse); Intervened by continuing to positively reinforce healthy behavior choice      Current / Ongoing Stressors and Concerns:   Client stated her muscle spasms are getting worse.     Has had a lot of symptoms since the ablation.   Struggling with paying bills-heat and electric. Isn't able to get assistance.        Treatment Objective(s) Addressed in This Session:   Increase interest, engagement, and pleasure in doing things  Feel less tired and more energy during the day        Intervention:   Discussed the levels of stress the client is experiencing. Talked about anxiety reduction skills she can work on and utilize in the moment when she is feeling overwhelmed. Discussed how the anxiety tends to increase her pain.    Assessments completed prior to visit:  The following assessments were completed by patient for this visit:  PHQ9:       10/27/2024     7:57 PM 11/4/2024    12:53 PM 11/10/2024     7:02 PM 11/20/2024    10:28 AM 12/1/2024     4:41 PM 12/16/2024     1:03 PM 1/6/2025     3:29 PM   PHQ-9 SCORE   PHQ-9 Total Score MyChart 23 (Severe depression) 22 (Severe depression) 21 (Severe depression) 23 (Severe depression) 23 (Severe depression) 23 (Severe depression) 24 (Severe depression)   PHQ-9 Total Score 23  22  21  23  23  23  24        Patient-reported     GAD7:       9/16/2024     3:27 PM 10/3/2024     1:58 PM 10/27/2024     7:59 PM 11/10/2024     7:03 PM 12/1/2024     4:42 PM 12/16/2024     1:05 PM 1/6/2025     3:30 PM   BO-7 SCORE   Total Score 20 (severe anxiety) 20 (severe anxiety) 20 (severe anxiety) 19 (severe anxiety) 20 (severe anxiety) 18 (severe anxiety) 21 (severe anxiety)   Total Score 20    20 20    20 20  19  20  18  21        Patient-reported     PROMIS 10-Global Health (all questions and answers displayed):       8/4/2024     7:20 PM 8/10/2024     2:51 PM 8/18/2024    11:36 AM 8/28/2024     2:07  PM 12/1/2024     4:43 PM 12/16/2024     1:07 PM 1/6/2025     3:31 PM   PROMIS 10   In general, would you say your health is: Poor Fair Poor Poor Poor Poor Poor   In general, would you say your quality of life is: Poor Poor Fair Fair Poor Poor Poor   In general, how would you rate your physical health? Poor Poor Poor Fair Poor Poor Fair   In general, how would you rate your mental health, including your mood and your ability to think? Poor Poor Poor Poor Poor Poor Poor   In general, how would you rate your satisfaction with your social activities and relationships? Fair Poor Fair Fair Poor Fair Fair   In general, please rate how well you carry out your usual social activities and roles Poor Poor Fair Fair Poor Poor Poor   To what extent are you able to carry out your everyday physical activities such as walking, climbing stairs, carrying groceries, or moving a chair? Moderately A little A little A little A little A little A little   In the past 7 days, how often have you been bothered by emotional problems such as feeling anxious, depressed, or irritable? Always Always Always Always Always Always Always   In the past 7 days, how would you rate your fatigue on average? Severe Very severe Very severe Severe Severe Very severe Very severe   In the past 7 days, how would you rate your pain on average, where 0 means no pain, and 10 means worst imaginable pain? 7 7 7 7 8 7 8   In general, would you say your health is: 1 2 1 1 1 1 1   In general, would you say your quality of life is: 1 1 2 2 1 1 1   In general, how would you rate your physical health? 1 1 1 2 1 1 2   In general, how would you rate your mental health, including your mood and your ability to think? 1 1 1 1 1 1 1   In general, how would you rate your satisfaction with your social activities and relationships? 2 1 2 2 1 2 2   In general, please rate how well you carry out your usual social activities and roles. (This includes activities at home, at work and in  your community, and responsibilities as a parent, child, spouse, employee, friend, etc.) 1 1 2 2 1 1 1   To what extent are you able to carry out your everyday physical activities such as walking, climbing stairs, carrying groceries, or moving a chair? 3 2 2 2 2 2 2   In the past 7 days, how often have you been bothered by emotional problems such as feeling anxious, depressed, or irritable? 5 5 5 5 5 5 5   In the past 7 days, how would you rate your fatigue on average? 4 5 5 4 4 5 5   In the past 7 days, how would you rate your pain on average, where 0 means no pain, and 10 means worst imaginable pain? 7 7 7 7 8 7 8   Global Mental Health Score 5 4    4 6 6 4  5  5    Global Physical Health Score 8 6    6 6 8 7  6  7    PROMIS TOTAL - SUBSCORES 13 10    10 12 14 11  11  12        Patient-reported     [place] Suicide Severity Rating Scale (Lifetime/Recent)      8/30/2024     8:20 AM 8/31/2024    12:07 PM 9/5/2024     5:33 PM 9/13/2024    10:52 AM 10/4/2024     4:54 PM 11/22/2024    11:34 AM 12/25/2024    11:30 PM   [place] Suicide Severity Rating (Lifetime/Recent)   Q1 Wished to be Dead (Past Month) 0-->no 0-->no 0-->no 0-->no 0-->no 0-->no 0-->no   Q2 Suicidal Thoughts (Past Month) 0-->no 0-->no 0-->no 0-->no 0-->no 0-->no 0-->no   Q6 Suicide Behavior (Lifetime) 0-->no 0-->no 1-->yes 0-->no 0-->no 0-->no 0-->no   If yes to Q6, within past 3 months?   0-->no       Level of Risk per Screen no risks indicated no risks indicated moderate risk no risks indicated no risks indicated no risks indicated no risks indicated         ASSESSMENT: Current Emotional / Mental Status (status of significant symptoms):   Risk status (Self / Other harm or suicidal ideation)   Patient denies current fears or concerns for personal safety.   Patient denies current or recent suicidal ideation or behaviors.   Patient denies current or recent homicidal ideation or behaviors.   Patient denies current or recent self injurious behavior or  ideation.   Patient denies other safety concerns.   Patient reports there has been no change in risk factors since their last session.     Patient reports there has been no change in protective factors since their last session.     Recommended that patient call 911 or go to the local ED should there be a change in any of these risk factors.     Appearance:   Appropriate    Eye Contact:   Good    Psychomotor Behavior: Normal    Attitude:   Cooperative    Orientation:   All   Speech    Rate / Production: Normal/ Responsive Normal     Volume:  Normal    Mood:    Normal   Affect:    Appropriate    Thought Content:  Clear    Thought Form:  Coherent    Insight:    Good      Medication Review:   No changes to current psychiatric medication(s)     Medication Compliance:   Yes     Changes in Health Issues:   None reported     Chemical Use Review:   Substance Use: Chemical use reviewed, no active concerns identified      Tobacco Use: No change in amount of tobacco use since last session.  Patient declined discussion at this time    Diagnosis:  1. Moderate episode of recurrent major depressive disorder (H)    2. Generalized anxiety disorder            Collateral Reports Completed:   Not Applicable    PLAN: (Patient Tasks / Therapist Tasks / Other)  Continue to utilize anxiety reduction skills daily.             Ricarda Bhatia, [facility]                                                         ______________________________________________________________________    Individual Treatment Plan    Patient's Name: Radha Beckwith  YOB: 1975    Date of Creation: 6/28/23  Date Treatment Plan Last Reviewed/Revised: 12/2/24    DSM5 Diagnoses: 296.32 (F33.1) Major Depressive Disorder, Recurrent Episode, Moderate _  Psychosocial / Contextual Factors: living with boyfriend, memory issues  PROMIS (reviewed every 90 days):     Referral / Collaboration:  Referral to another professional/service is not indicated at this  time..    Anticipated number of session for this episode of care: 9-12 sessions  Anticipation frequency of session:  as needed  Anticipated Duration of each session: 38-52 minutes  Treatment plan will be reviewed in 90 days or when goals have been changed.       MeasurableTreatment Goal(s) related to diagnosis / functional impairment(s)  Goal 1: Patient will increase communication skills with family members.    Objective #A (Patient Action)    Patient will learn & utilize at least 2 assertive communication skills weekly.  Status: Continued - Date(s): 12/2/24    Intervention(s)  Therapist will teach emotional regulation skills. distress tolerance skills, interpersonal effectiveness skills, emotion regluation skills, mindfulness skills, radical acceptance. Therapist will teach client how to ID body cues for anxiety, anxiety reduction techniques, how to ID triggers for depression and anxiety- decrease reactivity/eliminate, lifestyle changes to reduce depression and anxiety, communication skills, explore cognitive beliefs and help client to develop healthy cognitive patterns and beliefs.    Objective #B  Patient will use thought-stopping strategy daily to reduce intrusive thoughts.  Status: Continued - Date(s): 12/2/24    Intervention(s)  Therapist will teach emotional regulation skills. distress tolerance skills, interpersonal effectiveness skills, emotion regluation skills, mindfulness skills, radical acceptance. Therapist will teach client how to ID body cues for anxiety, anxiety reduction techniques, how to ID triggers for depression and anxiety- decrease reactivity/eliminate, lifestyle changes to reduce depression and anxiety, communication skills, explore cognitive beliefs and help client to develop healthy cognitive patterns and beliefs.      Goal 2: Patient will decrease depression symptoms.      Objective #A (Patient Action)    Status: Continued - Date(s): 12/2/24    Patient will Increase interest, engagement,  and pleasure in doing things  Decrease frequency and intensity of feeling down, depressed, hopeless  Improve quantity and quality of night time sleep / decrease daytime naps  Feel less tired and more energy during the day   Improve diet, appetite, mindful eating, and / or meal planning  Identify negative self-talk and behaviors: challenge core beliefs, myths, and actions  Improve concentration, focus, and mindfulness in daily activities   Feel less fidgety, restless or slow in daily activities / interpersonal interactions.    Intervention(s)  Therapist will teach emotional regulation skills. distress tolerance skills, interpersonal effectiveness skills, emotion regluation skills, mindfulness skills, radical acceptance. Therapist will teach client how to ID body cues for anxiety, anxiety reduction techniques, how to ID triggers for depression and anxiety- decrease reactivity/eliminate, lifestyle changes to reduce depression and anxiety, communication skills, explore cognitive beliefs and help client to develop healthy cognitive patterns and beliefs.    Objective #B  Patient will identify three distraction and diversion activities and use those activities to decrease level of anxiety  .    Status: Continued - Date(s):  12/2/24    Intervention(s)  Therapist will teach emotional regulation skills. distress tolerance skills, interpersonal effectiveness skills, emotion regluation skills, mindfulness skills, radical acceptance. Therapist will teach client how to ID body cues for anxiety, anxiety reduction techniques, how to ID triggers for depression and anxiety- decrease reactivity/eliminate, lifestyle changes to reduce depression and anxiety, communication skills, explore cognitive beliefs and help client to develop healthy cognitive patterns and beliefs.      Patient has reviewed and agreed to the above plan.      Ricarda Bhatia [facility]

## 2025-01-08 ENCOUNTER — HOSPITAL ENCOUNTER (OUTPATIENT)
Dept: MRI IMAGING | Facility: CLINIC | Age: 50
Discharge: HOME | End: 2025-01-08
Payer: COMMERCIAL

## 2025-01-08 DIAGNOSIS — M54.12 CERVICAL RADICULOPATHY: ICD-10-CM

## 2025-01-08 DIAGNOSIS — M62.838 MUSCLE SPASM: ICD-10-CM

## 2025-01-08 DIAGNOSIS — M79.18 MYOFASCIAL PAIN: ICD-10-CM

## 2025-01-08 DIAGNOSIS — G89.29 CHRONIC BILATERAL LOW BACK PAIN WITHOUT SCIATICA: ICD-10-CM

## 2025-01-08 DIAGNOSIS — M53.3 PAIN OF BOTH SACROILIAC JOINTS: ICD-10-CM

## 2025-01-08 DIAGNOSIS — M54.16 LUMBAR RADICULOPATHY: ICD-10-CM

## 2025-01-08 DIAGNOSIS — M54.50 CHRONIC BILATERAL LOW BACK PAIN WITHOUT SCIATICA: ICD-10-CM

## 2025-01-08 DIAGNOSIS — F41.9 ANXIETY: ICD-10-CM

## 2025-01-08 DIAGNOSIS — R11.0 NAUSEA: ICD-10-CM

## 2025-01-08 DIAGNOSIS — M54.2 CERVICALGIA: ICD-10-CM

## 2025-01-08 PROCEDURE — 72141 MRI NECK SPINE W/O DYE: CPT

## 2025-01-08 PROCEDURE — 72148 MRI LUMBAR SPINE W/O DYE: CPT

## 2025-01-08 RX ORDER — ONDANSETRON 4 MG/1
TABLET, ORALLY DISINTEGRATING ORAL
Qty: 20 TABLET | Refills: 0 | Status: SHIPPED | OUTPATIENT
Start: 2025-01-08

## 2025-01-09 NOTE — RESULT ENCOUNTER NOTE
Hello -    Here are my comments about the recent results. CERVICAL SPINE MRI:  1.  Overall mild multilevel cervical spondylosis as detailed above. No high-grade spinal canal or neural foraminal stenosis.  2.  Mild periarticular edema (swelling)associated with the facet joints bilaterally at C4-C5, which may represent sequelae of active facet arthropathy      Please let us know if you have any questions or concerns.    Regards,  Tayler Bergeron PA-C

## 2025-01-09 NOTE — RESULT ENCOUNTER NOTE
Hello -    Here are my comments about the recent results. LUMBAR SPINE MRI:  1.  Multilevel lumbar spondylosis as detailed above, not significantly progressed from study performed in September 2023.  2.  No high-grade spinal canal stenosis.  3.  Similar left subarticular/foraminal disc protrusion (slipped disc) at L5-S1, which contacts and posteriorly displaces the descending left S1 nerve roots. Correlate with left S1 radiculopathy.  4.  Similar mild neural foraminal stenosis on the left at L3-L4, L4-L5, and L5-S1      Please let us know if you have any questions or concerns.    Regards,  Tayler Bergeron PA-C

## 2025-01-13 ENCOUNTER — VIRTUAL VISIT (OUTPATIENT)
Dept: PSYCHOLOGY | Facility: CLINIC | Age: 50
End: 2025-01-13
Payer: COMMERCIAL

## 2025-01-13 DIAGNOSIS — F33.1 MODERATE EPISODE OF RECURRENT MAJOR DEPRESSIVE DISORDER (H): Primary | ICD-10-CM

## 2025-01-13 DIAGNOSIS — F41.1 GENERALIZED ANXIETY DISORDER: ICD-10-CM

## 2025-01-13 PROCEDURE — 90834 PSYTX W PT 45 MINUTES: CPT | Mod: 95 | Performed by: COUNSELOR

## 2025-01-13 NOTE — PROGRESS NOTES
Answers submitted by the patient for this visit:  Patient Health Questionnaire (Submitted on 1/12/2025)  If you checked off any problems, how difficult have these problems made it for you to do your work, take care of things at home, or get along with other people?: Extremely difficult  PHQ9 TOTAL SCORE: 23  Patient Health Questionnaire (G7) (Submitted on 1/6/2025)  BO 7 TOTAL SCORE: 21          [facility]                                     Progress Note    Patient Name: Radha Beckwith  Date: 1/13/25         Service Type: Individual      Session Start Time: 3:35pm Session End Time: 4:20pm     Session Length:  45    Session #: 64    Attendees: Client    Service Modality:  video      Provider verified identity through the following two step process.  Patient provided:  Patient is known previously to provider    Telemedicine Visit: The patient's condition can be safely assessed and treated via synchronous audio and visual telemedicine encounter.      Reason for Telemedicine Visit: Patient convenience (e.g. access to timely appointments / distance to available provider)    Originating Site (Patient Location): Patient's home    Distant Site (Provider Location): Provider Remote Setting- Home Office    Consent:  The patient/guardian has verbally consented to: the potential risks and benefits of telemedicine (video visit) versus in person care; bill my insurance or make self-payment for services provided; and responsibility for payment of non-covered services.     Patient would like the video invitation sent by:  My Chart    Mode of Communication:  video    Distant Location (Provider):  Off-site home office    As the provider I attest to compliance with applicable laws and regulations related to telemedicine.    DATA  Interactive Complexity: No  Crisis: No        Progress Since Last Session (Related to Symptoms / Goals / Homework):   Symptoms: No change .    Homework: Completed in  session      Episode of Care Goals: Satisfactory progress - MAINTENANCE (Working to maintain change, with risk of relapse); Intervened by continuing to positively reinforce healthy behavior choice      Current / Ongoing Stressors and Concerns:   Client stated she hasn't talked with her son in awhile.   Hasn't been sleeping well and feeling very tired.   Still having issues with boyfriend.   Had MRI last week.      Treatment Objective(s) Addressed in This Session:   Increase interest, engagement, and pleasure in doing things  Feel less tired and more energy during the day        Intervention:   Discussed the levels of stress the client is experiencing.Client was very tired today and struggled with recalling things. Stated this is due to a new medication. Continued discussion around her relationship with her boyfriend.     Assessments completed prior to visit:  The following assessments were completed by patient for this visit:  PHQ9:       11/4/2024    12:53 PM 11/10/2024     7:02 PM 11/20/2024    10:28 AM 12/1/2024     4:41 PM 12/16/2024     1:03 PM 1/6/2025     3:29 PM 1/12/2025     1:19 PM   PHQ-9 SCORE   PHQ-9 Total Score MyChart 22 (Severe depression) 21 (Severe depression) 23 (Severe depression) 23 (Severe depression) 23 (Severe depression) 24 (Severe depression) 23 (Severe depression)   PHQ-9 Total Score 22  21  23  23  23  24  23        Patient-reported     GAD7:       9/16/2024     3:27 PM 10/3/2024     1:58 PM 10/27/2024     7:59 PM 11/10/2024     7:03 PM 12/1/2024     4:42 PM 12/16/2024     1:05 PM 1/6/2025     3:30 PM   BO-7 SCORE   Total Score 20 (severe anxiety) 20 (severe anxiety) 20 (severe anxiety) 19 (severe anxiety) 20 (severe anxiety) 18 (severe anxiety) 21 (severe anxiety)   Total Score 20    20 20    20 20  19  20  18  21        Patient-reported     PROMIS 10-Global Health (all questions and answers displayed):       8/4/2024     7:20 PM 8/10/2024     2:51 PM 8/18/2024    11:36 AM 8/28/2024      2:07 PM 12/1/2024     4:43 PM 12/16/2024     1:07 PM 1/6/2025     3:31 PM   PROMIS 10   In general, would you say your health is: Poor Fair Poor Poor Poor Poor Poor   In general, would you say your quality of life is: Poor Poor Fair Fair Poor Poor Poor   In general, how would you rate your physical health? Poor Poor Poor Fair Poor Poor Fair   In general, how would you rate your mental health, including your mood and your ability to think? Poor Poor Poor Poor Poor Poor Poor   In general, how would you rate your satisfaction with your social activities and relationships? Fair Poor Fair Fair Poor Fair Fair   In general, please rate how well you carry out your usual social activities and roles Poor Poor Fair Fair Poor Poor Poor   To what extent are you able to carry out your everyday physical activities such as walking, climbing stairs, carrying groceries, or moving a chair? Moderately A little A little A little A little A little A little   In the past 7 days, how often have you been bothered by emotional problems such as feeling anxious, depressed, or irritable? Always Always Always Always Always Always Always   In the past 7 days, how would you rate your fatigue on average? Severe Very severe Very severe Severe Severe Very severe Very severe   In the past 7 days, how would you rate your pain on average, where 0 means no pain, and 10 means worst imaginable pain? 7 7 7 7 8 7 8   In general, would you say your health is: 1 2 1 1 1 1 1   In general, would you say your quality of life is: 1 1 2 2 1 1 1   In general, how would you rate your physical health? 1 1 1 2 1 1 2   In general, how would you rate your mental health, including your mood and your ability to think? 1 1 1 1 1 1 1   In general, how would you rate your satisfaction with your social activities and relationships? 2 1 2 2 1 2 2   In general, please rate how well you carry out your usual social activities and roles. (This includes activities at home, at work  and in your community, and responsibilities as a parent, child, spouse, employee, friend, etc.) 1 1 2 2 1 1 1   To what extent are you able to carry out your everyday physical activities such as walking, climbing stairs, carrying groceries, or moving a chair? 3 2 2 2 2 2 2   In the past 7 days, how often have you been bothered by emotional problems such as feeling anxious, depressed, or irritable? 5 5 5 5 5 5 5   In the past 7 days, how would you rate your fatigue on average? 4 5 5 4 4 5 5   In the past 7 days, how would you rate your pain on average, where 0 means no pain, and 10 means worst imaginable pain? 7 7 7 7 8 7 8   Global Mental Health Score 5 4    4 6 6 4  5  5    Global Physical Health Score 8 6    6 6 8 7  6  7    PROMIS TOTAL - SUBSCORES 13 10    10 12 14 11  11  12        Patient-reported     [place] Suicide Severity Rating Scale (Lifetime/Recent)      8/30/2024     8:20 AM 8/31/2024    12:07 PM 9/5/2024     5:33 PM 9/13/2024    10:52 AM 10/4/2024     4:54 PM 11/22/2024    11:34 AM 12/25/2024    11:30 PM   [place] Suicide Severity Rating (Lifetime/Recent)   Q1 Wished to be Dead (Past Month) 0-->no 0-->no 0-->no 0-->no 0-->no 0-->no 0-->no   Q2 Suicidal Thoughts (Past Month) 0-->no 0-->no 0-->no 0-->no 0-->no 0-->no 0-->no   Q6 Suicide Behavior (Lifetime) 0-->no 0-->no 1-->yes 0-->no 0-->no 0-->no 0-->no   If yes to Q6, within past 3 months?   0-->no       Level of Risk per Screen no risks indicated no risks indicated moderate risk no risks indicated no risks indicated no risks indicated no risks indicated         ASSESSMENT: Current Emotional / Mental Status (status of significant symptoms):   Risk status (Self / Other harm or suicidal ideation)   Patient denies current fears or concerns for personal safety.   Patient denies current or recent suicidal ideation or behaviors.   Patient denies current or recent homicidal ideation or behaviors.   Patient denies current or recent self injurious behavior  or ideation.   Patient denies other safety concerns.   Patient reports there has been no change in risk factors since their last session.     Patient reports there has been no change in protective factors since their last session.     Recommended that patient call 911 or go to the local ED should there be a change in any of these risk factors.     Appearance:   Appropriate    Eye Contact:   Good    Psychomotor Behavior: Normal    Attitude:   Cooperative    Orientation:   All   Speech    Rate / Production: Normal/ Responsive Normal     Volume:  Normal    Mood:    Normal   Affect:    Appropriate    Thought Content:  Clear    Thought Form:  Coherent    Insight:    Good      Medication Review:   No changes to current psychiatric medication(s)     Medication Compliance:   Yes     Changes in Health Issues:   None reported     Chemical Use Review:   Substance Use: Chemical use reviewed, no active concerns identified      Tobacco Use: No change in amount of tobacco use since last session.  Patient declined discussion at this time    Diagnosis:  1. Moderate episode of recurrent major depressive disorder (H)    2. Generalized anxiety disorder            Collateral Reports Completed:   Not Applicable    PLAN: (Patient Tasks / Therapist Tasks / Other)  Continue to utilize anxiety reduction skills daily.             Ricarda Bhatia, [facility]                                                         ______________________________________________________________________    Individual Treatment Plan    Patient's Name: Radha Beckwith  YOB: 1975    Date of Creation: 6/28/23  Date Treatment Plan Last Reviewed/Revised: 12/2/24    DSM5 Diagnoses: 296.32 (F33.1) Major Depressive Disorder, Recurrent Episode, Moderate _  Psychosocial / Contextual Factors: living with boyfriend, memory issues  PROMIS (reviewed every 90 days):     Referral / Collaboration:  Referral to another professional/service is not indicated at this  time..    Anticipated number of session for this episode of care: 9-12 sessions  Anticipation frequency of session:  as needed  Anticipated Duration of each session: 38-52 minutes  Treatment plan will be reviewed in 90 days or when goals have been changed.       MeasurableTreatment Goal(s) related to diagnosis / functional impairment(s)  Goal 1: Patient will increase communication skills with family members.    Objective #A (Patient Action)    Patient will learn & utilize at least 2 assertive communication skills weekly.  Status: Continued - Date(s): 12/2/24    Intervention(s)  Therapist will teach emotional regulation skills. distress tolerance skills, interpersonal effectiveness skills, emotion regluation skills, mindfulness skills, radical acceptance. Therapist will teach client how to ID body cues for anxiety, anxiety reduction techniques, how to ID triggers for depression and anxiety- decrease reactivity/eliminate, lifestyle changes to reduce depression and anxiety, communication skills, explore cognitive beliefs and help client to develop healthy cognitive patterns and beliefs.    Objective #B  Patient will use thought-stopping strategy daily to reduce intrusive thoughts.  Status: Continued - Date(s): 12/2/24    Intervention(s)  Therapist will teach emotional regulation skills. distress tolerance skills, interpersonal effectiveness skills, emotion regluation skills, mindfulness skills, radical acceptance. Therapist will teach client how to ID body cues for anxiety, anxiety reduction techniques, how to ID triggers for depression and anxiety- decrease reactivity/eliminate, lifestyle changes to reduce depression and anxiety, communication skills, explore cognitive beliefs and help client to develop healthy cognitive patterns and beliefs.      Goal 2: Patient will decrease depression symptoms.      Objective #A (Patient Action)    Status: Continued - Date(s): 12/2/24    Patient will Increase interest, engagement,  and pleasure in doing things  Decrease frequency and intensity of feeling down, depressed, hopeless  Improve quantity and quality of night time sleep / decrease daytime naps  Feel less tired and more energy during the day   Improve diet, appetite, mindful eating, and / or meal planning  Identify negative self-talk and behaviors: challenge core beliefs, myths, and actions  Improve concentration, focus, and mindfulness in daily activities   Feel less fidgety, restless or slow in daily activities / interpersonal interactions.    Intervention(s)  Therapist will teach emotional regulation skills. distress tolerance skills, interpersonal effectiveness skills, emotion regluation skills, mindfulness skills, radical acceptance. Therapist will teach client how to ID body cues for anxiety, anxiety reduction techniques, how to ID triggers for depression and anxiety- decrease reactivity/eliminate, lifestyle changes to reduce depression and anxiety, communication skills, explore cognitive beliefs and help client to develop healthy cognitive patterns and beliefs.    Objective #B  Patient will identify three distraction and diversion activities and use those activities to decrease level of anxiety  .    Status: Continued - Date(s):  12/2/24    Intervention(s)  Therapist will teach emotional regulation skills. distress tolerance skills, interpersonal effectiveness skills, emotion regluation skills, mindfulness skills, radical acceptance. Therapist will teach client how to ID body cues for anxiety, anxiety reduction techniques, how to ID triggers for depression and anxiety- decrease reactivity/eliminate, lifestyle changes to reduce depression and anxiety, communication skills, explore cognitive beliefs and help client to develop healthy cognitive patterns and beliefs.      Patient has reviewed and agreed to the above plan.      Ricarda Bhatia [facility]

## 2025-01-16 ENCOUNTER — MYC MEDICAL ADVICE (OUTPATIENT)
Dept: PALLIATIVE MEDICINE | Facility: CLINIC | Age: 50
End: 2025-01-16

## 2025-01-20 ENCOUNTER — VIRTUAL VISIT (OUTPATIENT)
Dept: PSYCHOLOGY | Facility: CLINIC | Age: 50
End: 2025-01-20
Payer: COMMERCIAL

## 2025-01-20 DIAGNOSIS — F33.1 MODERATE EPISODE OF RECURRENT MAJOR DEPRESSIVE DISORDER (H): Primary | ICD-10-CM

## 2025-01-20 DIAGNOSIS — F41.1 GENERALIZED ANXIETY DISORDER: ICD-10-CM

## 2025-01-20 PROCEDURE — 90837 PSYTX W PT 60 MINUTES: CPT | Mod: 95 | Performed by: COUNSELOR

## 2025-01-20 NOTE — PROGRESS NOTES
[facility]                                     Progress Note    Patient Name: Radha Beckwith  Date: 1/20/25         Service Type: Individual      Session Start Time: 3:33pm Session End Time: 4:30pm     Session Length:  57    Session #: 65    Attendees: Client    Service Modality:  video      Provider verified identity through the following two step process.  Patient provided:  Patient is known previously to provider    Telemedicine Visit: The patient's condition can be safely assessed and treated via synchronous audio and visual telemedicine encounter.      Reason for Telemedicine Visit: Patient convenience (e.g. access to timely appointments / distance to available provider)    Originating Site (Patient Location): Patient's home    Distant Site (Provider Location): Provider Remote Setting- Home Office    Consent:  The patient/guardian has verbally consented to: the potential risks and benefits of telemedicine (video visit) versus in person care; bill my insurance or make self-payment for services provided; and responsibility for payment of non-covered services.     Patient would like the video invitation sent by:  My Chart    Mode of Communication:  video    Distant Location (Provider):  Off-site home office    As the provider I attest to compliance with applicable laws and regulations related to telemedicine.    DATA  Interactive Complexity: No  Crisis: No        Progress Since Last Session (Related to Symptoms / Goals / Homework):   Symptoms: No change .    Homework: Completed in session      Episode of Care Goals: Satisfactory progress - MAINTENANCE (Working to maintain change, with risk of relapse); Intervened by continuing to positively reinforce healthy behavior choice      Current / Ongoing Stressors and Concerns:   Client is having difficulties with technology lately. Had some energy for a week and the last 3 days has been irritable and down.      Treatment Objective(s)  Addressed in This Session:   Increase interest, engagement, and pleasure in doing things  Feel less tired and more energy during the day        Intervention:   Discussed the client's current symptomology and discussed various things she can ask her doctor about. Processed through relationship frustrations and what she is willing to do about them.      Assessments completed prior to visit:  The following assessments were completed by patient for this visit:  PHQ9:       11/4/2024    12:53 PM 11/10/2024     7:02 PM 11/20/2024    10:28 AM 12/1/2024     4:41 PM 12/16/2024     1:03 PM 1/6/2025     3:29 PM 1/12/2025     1:19 PM   PHQ-9 SCORE   PHQ-9 Total Score MyChart 22 (Severe depression) 21 (Severe depression) 23 (Severe depression) 23 (Severe depression) 23 (Severe depression) 24 (Severe depression) 23 (Severe depression)   PHQ-9 Total Score 22  21  23  23  23  24  23        Patient-reported     GAD7:       9/16/2024     3:27 PM 10/3/2024     1:58 PM 10/27/2024     7:59 PM 11/10/2024     7:03 PM 12/1/2024     4:42 PM 12/16/2024     1:05 PM 1/6/2025     3:30 PM   BO-7 SCORE   Total Score 20 (severe anxiety) 20 (severe anxiety) 20 (severe anxiety) 19 (severe anxiety) 20 (severe anxiety) 18 (severe anxiety) 21 (severe anxiety)   Total Score 20    20 20    20 20  19  20  18  21        Patient-reported     PROMIS 10-Global Health (all questions and answers displayed):       8/4/2024     7:20 PM 8/10/2024     2:51 PM 8/18/2024    11:36 AM 8/28/2024     2:07 PM 12/1/2024     4:43 PM 12/16/2024     1:07 PM 1/6/2025     3:31 PM   PROMIS 10   In general, would you say your health is: Poor Fair Poor Poor Poor Poor Poor   In general, would you say your quality of life is: Poor Poor Fair Fair Poor Poor Poor   In general, how would you rate your physical health? Poor Poor Poor Fair Poor Poor Fair   In general, how would you rate your mental health, including your mood and your ability to think? Poor Poor Poor Poor Poor Poor Poor    In general, how would you rate your satisfaction with your social activities and relationships? Fair Poor Fair Fair Poor Fair Fair   In general, please rate how well you carry out your usual social activities and roles Poor Poor Fair Fair Poor Poor Poor   To what extent are you able to carry out your everyday physical activities such as walking, climbing stairs, carrying groceries, or moving a chair? Moderately A little A little A little A little A little A little   In the past 7 days, how often have you been bothered by emotional problems such as feeling anxious, depressed, or irritable? Always Always Always Always Always Always Always   In the past 7 days, how would you rate your fatigue on average? Severe Very severe Very severe Severe Severe Very severe Very severe   In the past 7 days, how would you rate your pain on average, where 0 means no pain, and 10 means worst imaginable pain? 7 7 7 7 8 7 8   In general, would you say your health is: 1 2 1 1 1 1 1   In general, would you say your quality of life is: 1 1 2 2 1 1 1   In general, how would you rate your physical health? 1 1 1 2 1 1 2   In general, how would you rate your mental health, including your mood and your ability to think? 1 1 1 1 1 1 1   In general, how would you rate your satisfaction with your social activities and relationships? 2 1 2 2 1 2 2   In general, please rate how well you carry out your usual social activities and roles. (This includes activities at home, at work and in your community, and responsibilities as a parent, child, spouse, employee, friend, etc.) 1 1 2 2 1 1 1   To what extent are you able to carry out your everyday physical activities such as walking, climbing stairs, carrying groceries, or moving a chair? 3 2 2 2 2 2 2   In the past 7 days, how often have you been bothered by emotional problems such as feeling anxious, depressed, or irritable? 5 5 5 5 5 5 5   In the past 7 days, how would you rate your fatigue on  average? 4 5 5 4 4 5 5   In the past 7 days, how would you rate your pain on average, where 0 means no pain, and 10 means worst imaginable pain? 7 7 7 7 8 7 8   Global Mental Health Score 5 4    4 6 6 4  5  5    Global Physical Health Score 8 6    6 6 8 7  6  7    PROMIS TOTAL - SUBSCORES 13 10    10 12 14 11  11  12        Patient-reported     [place] Suicide Severity Rating Scale (Lifetime/Recent)      8/30/2024     8:20 AM 8/31/2024    12:07 PM 9/5/2024     5:33 PM 9/13/2024    10:52 AM 10/4/2024     4:54 PM 11/22/2024    11:34 AM 12/25/2024    11:30 PM   [place] Suicide Severity Rating (Lifetime/Recent)   Q1 Wished to be Dead (Past Month) 0-->no 0-->no 0-->no 0-->no 0-->no 0-->no 0-->no   Q2 Suicidal Thoughts (Past Month) 0-->no 0-->no 0-->no 0-->no 0-->no 0-->no 0-->no   Q6 Suicide Behavior (Lifetime) 0-->no 0-->no 1-->yes 0-->no 0-->no 0-->no 0-->no   If yes to Q6, within past 3 months?   0-->no       Level of Risk per Screen no risks indicated no risks indicated moderate risk no risks indicated no risks indicated no risks indicated no risks indicated         ASSESSMENT: Current Emotional / Mental Status (status of significant symptoms):   Risk status (Self / Other harm or suicidal ideation)   Patient denies current fears or concerns for personal safety.   Patient denies current or recent suicidal ideation or behaviors.   Patient denies current or recent homicidal ideation or behaviors.   Patient denies current or recent self injurious behavior or ideation.   Patient denies other safety concerns.   Patient reports there has been no change in risk factors since their last session.     Patient reports there has been no change in protective factors since their last session.     Recommended that patient call 911 or go to the local ED should there be a change in any of these risk factors.     Appearance:   Appropriate    Eye Contact:   Good    Psychomotor Behavior: Normal    Attitude:   Cooperative     Orientation:   All   Speech    Rate / Production: Normal/ Responsive Normal     Volume:  Normal    Mood:    Normal   Affect:    Appropriate    Thought Content:  Clear    Thought Form:  Coherent    Insight:    Good      Medication Review:   No changes to current psychiatric medication(s)     Medication Compliance:   Yes     Changes in Health Issues:   None reported     Chemical Use Review:   Substance Use: Chemical use reviewed, no active concerns identified      Tobacco Use: No change in amount of tobacco use since last session.  Patient declined discussion at this time    Diagnosis:  1. Moderate episode of recurrent major depressive disorder (H)    2. Generalized anxiety disorder            Collateral Reports Completed:   Not Applicable    PLAN: (Patient Tasks / Therapist Tasks / Other)  Continue to utilize anxiety and stress reduction skills daily.   Follow up with her PCP.             Ricarda Bhatia, [facility]                                                         ______________________________________________________________________    Individual Treatment Plan    Patient's Name: Radha Beckwith  YOB: 1975    Date of Creation: 6/28/23  Date Treatment Plan Last Reviewed/Revised: 12/2/24    DSM5 Diagnoses: 296.32 (F33.1) Major Depressive Disorder, Recurrent Episode, Moderate _  Psychosocial / Contextual Factors: living with boyfriend, memory issues  PROMIS (reviewed every 90 days):     Referral / Collaboration:  Referral to another professional/service is not indicated at this time..    Anticipated number of session for this episode of care: 9-12 sessions  Anticipation frequency of session:  as needed  Anticipated Duration of each session: 38-52 minutes  Treatment plan will be reviewed in 90 days or when goals have been changed.       MeasurableTreatment Goal(s) related to diagnosis / functional impairment(s)  Goal 1: Patient will increase communication skills with family members.    Objective  #A (Patient Action)    Patient will learn & utilize at least 2 assertive communication skills weekly.  Status: Continued - Date(s): 12/2/24    Intervention(s)  Therapist will teach emotional regulation skills. distress tolerance skills, interpersonal effectiveness skills, emotion regluation skills, mindfulness skills, radical acceptance. Therapist will teach client how to ID body cues for anxiety, anxiety reduction techniques, how to ID triggers for depression and anxiety- decrease reactivity/eliminate, lifestyle changes to reduce depression and anxiety, communication skills, explore cognitive beliefs and help client to develop healthy cognitive patterns and beliefs.    Objective #B  Patient will use thought-stopping strategy daily to reduce intrusive thoughts.  Status: Continued - Date(s): 12/2/24    Intervention(s)  Therapist will teach emotional regulation skills. distress tolerance skills, interpersonal effectiveness skills, emotion regluation skills, mindfulness skills, radical acceptance. Therapist will teach client how to ID body cues for anxiety, anxiety reduction techniques, how to ID triggers for depression and anxiety- decrease reactivity/eliminate, lifestyle changes to reduce depression and anxiety, communication skills, explore cognitive beliefs and help client to develop healthy cognitive patterns and beliefs.      Goal 2: Patient will decrease depression symptoms.      Objective #A (Patient Action)    Status: Continued - Date(s): 12/2/24    Patient will Increase interest, engagement, and pleasure in doing things  Decrease frequency and intensity of feeling down, depressed, hopeless  Improve quantity and quality of night time sleep / decrease daytime naps  Feel less tired and more energy during the day   Improve diet, appetite, mindful eating, and / or meal planning  Identify negative self-talk and behaviors: challenge core beliefs, myths, and actions  Improve concentration, focus, and mindfulness in  daily activities   Feel less fidgety, restless or slow in daily activities / interpersonal interactions.    Intervention(s)  Therapist will teach emotional regulation skills. distress tolerance skills, interpersonal effectiveness skills, emotion regluation skills, mindfulness skills, radical acceptance. Therapist will teach client how to ID body cues for anxiety, anxiety reduction techniques, how to ID triggers for depression and anxiety- decrease reactivity/eliminate, lifestyle changes to reduce depression and anxiety, communication skills, explore cognitive beliefs and help client to develop healthy cognitive patterns and beliefs.    Objective #B  Patient will identify three distraction and diversion activities and use those activities to decrease level of anxiety  .    Status: Continued - Date(s):  12/2/24    Intervention(s)  Therapist will teach emotional regulation skills. distress tolerance skills, interpersonal effectiveness skills, emotion regluation skills, mindfulness skills, radical acceptance. Therapist will teach client how to ID body cues for anxiety, anxiety reduction techniques, how to ID triggers for depression and anxiety- decrease reactivity/eliminate, lifestyle changes to reduce depression and anxiety, communication skills, explore cognitive beliefs and help client to develop healthy cognitive patterns and beliefs.      Patient has reviewed and agreed to the above plan.      Ricarda Bhatia [facility]

## 2025-01-21 NOTE — TELEPHONE ENCOUNTER
Patient requesting medical cannabis renewal:     [email or web address]. My patient ID was: [id number]     Patient notes that she was given 10 day prescription of  Norflex in ED which she felt was more effective than her currently prescribed muscle relaxers.  Patient was unable to attend last scheduled appointment on 1/2 as she was scheduled for injection on same date.   Patient is aware she may not use cyclobenzaprine with Norflex, but would like to change to this medication.     Routing to provider to review.     Reema Gaffney RN

## 2025-01-23 ENCOUNTER — TELEPHONE (OUTPATIENT)
Dept: NEUROSURGERY | Facility: CLINIC | Age: 50
End: 2025-01-23
Payer: COMMERCIAL

## 2025-01-23 NOTE — TELEPHONE ENCOUNTER
Attempted to reach patient to remind them about appointment scheduled with Marielle Kaur NP on 1/24/25 in our [facility].    A voicemail was left with a call back number if the patient has questions or would like to reschedule.

## 2025-01-27 ENCOUNTER — VIRTUAL VISIT (OUTPATIENT)
Dept: PSYCHOLOGY | Facility: CLINIC | Age: 50
End: 2025-01-27
Payer: COMMERCIAL

## 2025-01-27 DIAGNOSIS — F33.1 MODERATE EPISODE OF RECURRENT MAJOR DEPRESSIVE DISORDER (H): Primary | ICD-10-CM

## 2025-01-27 DIAGNOSIS — F41.1 GENERALIZED ANXIETY DISORDER: ICD-10-CM

## 2025-01-27 PROCEDURE — 90834 PSYTX W PT 45 MINUTES: CPT | Mod: 95 | Performed by: COUNSELOR

## 2025-01-27 NOTE — TELEPHONE ENCOUNTER
I renewed her certification for medical cannabis now. Medication changes will need to be addressed at next visit.     Jenny Landrum DNP, APRN, AGNP-C  [facility]

## 2025-01-27 NOTE — PROGRESS NOTES
[facility]                                     Progress Note    Patient Name: Radha Beckwith  Date: 1/27/25         Service Type: Individual      Session Start Time: 3:33pm Session End Time: 4:18pm     Session Length:  45    Session #: 66    Attendees: Client    Service Modality:  video      Provider verified identity through the following two step process.  Patient provided:  Patient is known previously to provider    Telemedicine Visit: The patient's condition can be safely assessed and treated via synchronous audio and visual telemedicine encounter.      Reason for Telemedicine Visit: Patient convenience (e.g. access to timely appointments / distance to available provider)    Originating Site (Patient Location): Patient's home    Distant Site (Provider Location): Provider Remote Setting- Home Office    Consent:  The patient/guardian has verbally consented to: the potential risks and benefits of telemedicine (video visit) versus in person care; bill my insurance or make self-payment for services provided; and responsibility for payment of non-covered services.     Patient would like the video invitation sent by:  My Chart    Mode of Communication:  video    Distant Location (Provider):  Off-site home office    As the provider I attest to compliance with applicable laws and regulations related to telemedicine.    DATA  Interactive Complexity: No  Crisis: No        Progress Since Last Session (Related to Symptoms / Goals / Homework):   Symptoms: No change .    Homework: Completed in session      Episode of Care Goals: Satisfactory progress - MAINTENANCE (Working to maintain change, with risk of relapse); Intervened by continuing to positively reinforce healthy behavior choice      Current / Ongoing Stressors and Concerns:   Client has her colonoscopy tomorrow. Feeling really tired today.   Talked with her son recently.      Treatment Objective(s) Addressed in This Session:   Increase  interest, engagement, and pleasure in doing things  Feel less tired and more energy during the day        Intervention:   Discussed the client's upcoming colonoscopy and who can accompany her to and from the appointment. Her best friend who usually takes her is ill and so is the client's boyfriend. She stated she wanted to change appointment times as well so she doesn't continue to get interrupted with her boyfriend coming home from work. She continues to feel very disconnected from him and is mostly working on herself right now.       Assessments completed prior to visit:  The following assessments were completed by patient for this visit:  PHQ9:       11/4/2024    12:53 PM 11/10/2024     7:02 PM 11/20/2024    10:28 AM 12/1/2024     4:41 PM 12/16/2024     1:03 PM 1/6/2025     3:29 PM 1/12/2025     1:19 PM   PHQ-9 SCORE   PHQ-9 Total Score MyChart 22 (Severe depression) 21 (Severe depression) 23 (Severe depression) 23 (Severe depression) 23 (Severe depression) 24 (Severe depression) 23 (Severe depression)   PHQ-9 Total Score 22  21  23  23  23  24  23        Patient-reported     GAD7:       9/16/2024     3:27 PM 10/3/2024     1:58 PM 10/27/2024     7:59 PM 11/10/2024     7:03 PM 12/1/2024     4:42 PM 12/16/2024     1:05 PM 1/6/2025     3:30 PM   BO-7 SCORE   Total Score 20 (severe anxiety) 20 (severe anxiety) 20 (severe anxiety) 19 (severe anxiety) 20 (severe anxiety) 18 (severe anxiety) 21 (severe anxiety)   Total Score 20    20 20    20 20  19  20  18  21        Patient-reported     PROMIS 10-Global Health (all questions and answers displayed):       8/4/2024     7:20 PM 8/10/2024     2:51 PM 8/18/2024    11:36 AM 8/28/2024     2:07 PM 12/1/2024     4:43 PM 12/16/2024     1:07 PM 1/6/2025     3:31 PM   PROMIS 10   In general, would you say your health is: Poor Fair Poor Poor Poor Poor Poor   In general, would you say your quality of life is: Poor Poor Fair Fair Poor Poor Poor   In general, how would you rate your  physical health? Poor Poor Poor Fair Poor Poor Fair   In general, how would you rate your mental health, including your mood and your ability to think? Poor Poor Poor Poor Poor Poor Poor   In general, how would you rate your satisfaction with your social activities and relationships? Fair Poor Fair Fair Poor Fair Fair   In general, please rate how well you carry out your usual social activities and roles Poor Poor Fair Fair Poor Poor Poor   To what extent are you able to carry out your everyday physical activities such as walking, climbing stairs, carrying groceries, or moving a chair? Moderately A little A little A little A little A little A little   In the past 7 days, how often have you been bothered by emotional problems such as feeling anxious, depressed, or irritable? Always Always Always Always Always Always Always   In the past 7 days, how would you rate your fatigue on average? Severe Very severe Very severe Severe Severe Very severe Very severe   In the past 7 days, how would you rate your pain on average, where 0 means no pain, and 10 means worst imaginable pain? 7 7 7 7 8 7 8   In general, would you say your health is: 1 2 1 1 1 1 1   In general, would you say your quality of life is: 1 1 2 2 1 1 1   In general, how would you rate your physical health? 1 1 1 2 1 1 2   In general, how would you rate your mental health, including your mood and your ability to think? 1 1 1 1 1 1 1   In general, how would you rate your satisfaction with your social activities and relationships? 2 1 2 2 1 2 2   In general, please rate how well you carry out your usual social activities and roles. (This includes activities at home, at work and in your community, and responsibilities as a parent, child, spouse, employee, friend, etc.) 1 1 2 2 1 1 1   To what extent are you able to carry out your everyday physical activities such as walking, climbing stairs, carrying groceries, or moving a chair? 3 2 2 2 2 2 2   In the past 7  days, how often have you been bothered by emotional problems such as feeling anxious, depressed, or irritable? 5 5 5 5 5 5 5   In the past 7 days, how would you rate your fatigue on average? 4 5 5 4 4 5 5   In the past 7 days, how would you rate your pain on average, where 0 means no pain, and 10 means worst imaginable pain? 7 7 7 7 8 7 8   Global Mental Health Score 5 4    4 6 6 4  5  5    Global Physical Health Score 8 6    6 6 8 7  6  7    PROMIS TOTAL - SUBSCORES 13 10    10 12 14 11  11  12        Patient-reported     [place] Suicide Severity Rating Scale (Lifetime/Recent)      8/30/2024     8:20 AM 8/31/2024    12:07 PM 9/5/2024     5:33 PM 9/13/2024    10:52 AM 10/4/2024     4:54 PM 11/22/2024    11:34 AM 12/25/2024    11:30 PM   [place] Suicide Severity Rating (Lifetime/Recent)   Q1 Wished to be Dead (Past Month) 0-->no 0-->no 0-->no 0-->no 0-->no 0-->no 0-->no   Q2 Suicidal Thoughts (Past Month) 0-->no 0-->no 0-->no 0-->no 0-->no 0-->no 0-->no   Q6 Suicide Behavior (Lifetime) 0-->no 0-->no 1-->yes 0-->no 0-->no 0-->no 0-->no   If yes to Q6, within past 3 months?   0-->no       Level of Risk per Screen no risks indicated no risks indicated moderate risk no risks indicated no risks indicated no risks indicated no risks indicated         ASSESSMENT: Current Emotional / Mental Status (status of significant symptoms):   Risk status (Self / Other harm or suicidal ideation)   Patient denies current fears or concerns for personal safety.   Patient denies current or recent suicidal ideation or behaviors.   Patient denies current or recent homicidal ideation or behaviors.   Patient denies current or recent self injurious behavior or ideation.   Patient denies other safety concerns.   Patient reports there has been no change in risk factors since their last session.     Patient reports there has been no change in protective factors since their last session.     Recommended that patient call 911 or go to the local ED  should there be a change in any of these risk factors.     Appearance:   Appropriate    Eye Contact:   Good    Psychomotor Behavior: Normal    Attitude:   Cooperative    Orientation:   All   Speech    Rate / Production: Normal/ Responsive Normal     Volume:  Normal    Mood:    Normal   Affect:    Appropriate    Thought Content:  Clear    Thought Form:  Coherent    Insight:    Good      Medication Review:   No changes to current psychiatric medication(s)     Medication Compliance:   Yes     Changes in Health Issues:   None reported     Chemical Use Review:   Substance Use: Chemical use reviewed, no active concerns identified      Tobacco Use: No change in amount of tobacco use since last session.  Patient declined discussion at this time    Diagnosis:  1. Moderate episode of recurrent major depressive disorder (H)    2. Generalized anxiety disorder            Collateral Reports Completed:   Not Applicable    PLAN: (Patient Tasks / Therapist Tasks / Other)  Continue to utilize anxiety and stress reduction skills daily.               Ricarda Bhatia [facility]                                                         ______________________________________________________________________    Individual Treatment Plan    Patient's Name: Radha Beckwith  YOB: 1975    Date of Creation: 6/28/23  Date Treatment Plan Last Reviewed/Revised: 12/2/24    DSM5 Diagnoses: 296.32 (F33.1) Major Depressive Disorder, Recurrent Episode, Moderate _  Psychosocial / Contextual Factors: living with boyfriend, memory issues  PROMIS (reviewed every 90 days):     Referral / Collaboration:  Referral to another professional/service is not indicated at this time..    Anticipated number of session for this episode of care: 9-12 sessions  Anticipation frequency of session:  as needed  Anticipated Duration of each session: 38-52 minutes  Treatment plan will be reviewed in 90 days or when goals have been changed.       MeasurableTreatment  Goal(s) related to diagnosis / functional impairment(s)  Goal 1: Patient will increase communication skills with family members.    Objective #A (Patient Action)    Patient will learn & utilize at least 2 assertive communication skills weekly.  Status: Continued - Date(s): 12/2/24    Intervention(s)  Therapist will teach emotional regulation skills. distress tolerance skills, interpersonal effectiveness skills, emotion regluation skills, mindfulness skills, radical acceptance. Therapist will teach client how to ID body cues for anxiety, anxiety reduction techniques, how to ID triggers for depression and anxiety- decrease reactivity/eliminate, lifestyle changes to reduce depression and anxiety, communication skills, explore cognitive beliefs and help client to develop healthy cognitive patterns and beliefs.    Objective #B  Patient will use thought-stopping strategy daily to reduce intrusive thoughts.  Status: Continued - Date(s): 12/2/24    Intervention(s)  Therapist will teach emotional regulation skills. distress tolerance skills, interpersonal effectiveness skills, emotion regluation skills, mindfulness skills, radical acceptance. Therapist will teach client how to ID body cues for anxiety, anxiety reduction techniques, how to ID triggers for depression and anxiety- decrease reactivity/eliminate, lifestyle changes to reduce depression and anxiety, communication skills, explore cognitive beliefs and help client to develop healthy cognitive patterns and beliefs.      Goal 2: Patient will decrease depression symptoms.      Objective #A (Patient Action)    Status: Continued - Date(s): 12/2/24    Patient will Increase interest, engagement, and pleasure in doing things  Decrease frequency and intensity of feeling down, depressed, hopeless  Improve quantity and quality of night time sleep / decrease daytime naps  Feel less tired and more energy during the day   Improve diet, appetite, mindful eating, and / or meal  planning  Identify negative self-talk and behaviors: challenge core beliefs, myths, and actions  Improve concentration, focus, and mindfulness in daily activities   Feel less fidgety, restless or slow in daily activities / interpersonal interactions.    Intervention(s)  Therapist will teach emotional regulation skills. distress tolerance skills, interpersonal effectiveness skills, emotion regluation skills, mindfulness skills, radical acceptance. Therapist will teach client how to ID body cues for anxiety, anxiety reduction techniques, how to ID triggers for depression and anxiety- decrease reactivity/eliminate, lifestyle changes to reduce depression and anxiety, communication skills, explore cognitive beliefs and help client to develop healthy cognitive patterns and beliefs.    Objective #B  Patient will identify three distraction and diversion activities and use those activities to decrease level of anxiety  .    Status: Continued - Date(s):  12/2/24    Intervention(s)  Therapist will teach emotional regulation skills. distress tolerance skills, interpersonal effectiveness skills, emotion regluation skills, mindfulness skills, radical acceptance. Therapist will teach client how to ID body cues for anxiety, anxiety reduction techniques, how to ID triggers for depression and anxiety- decrease reactivity/eliminate, lifestyle changes to reduce depression and anxiety, communication skills, explore cognitive beliefs and help client to develop healthy cognitive patterns and beliefs.      Patient has reviewed and agreed to the above plan.      Ricarda Bhatia [facility]

## 2025-01-27 NOTE — H&P
[facility] Pre-op History and Physical    Radha Beckwith MRN# [id number]   Age: 49 year old YOB: 1975      Date of Surgery: 1/28/2025 Location [facility]      Date of Exam 1/28/2025 Facility (In hospital)       Home clinic: [facility]  Primary care provider: Gisselle Linn         Chief Complaint and/or Reason for Procedure:   No chief complaint on file.  Colonoscopy. Abd pain. Abnormal CT 11/2024. Elevated CRP.  2023 exam prep limited.       Active problem list:     Patient Active Problem List    Diagnosis Date Noted    Muscle spasm 12/27/2024     Priority: Medium    Cervicalgia 04/02/2024     Priority: Medium    Cervical radiculopathy 02/14/2024     Priority: Medium    Chronic bilateral thoracic back pain 02/14/2024     Priority: Medium    Lumbar radiculopathy 02/14/2024     Priority: Medium    Pain of both sacroiliac joints 02/14/2024     Priority: Medium    Radicular pain of upper extremity 02/14/2024     Priority: Medium    Somatic dysfunction of pelvic region 01/10/2024     Priority: Medium    Myofascial pain 01/10/2024     Priority: Medium    Generalized anxiety disorder 11/27/2023     Priority: Medium    Chronic low back pain with bilateral sciatica 06/15/2023     Priority: Medium    Chronic neck pain 06/15/2023     Priority: Medium    Moderate episode of recurrent major depressive disorder (H) 06/29/2022     Priority: Medium    Supraventricular tachycardia 06/03/2019     Priority: Medium    Psychophysiological insomnia 12/30/2017     Priority: Medium    Chronic bilateral low back pain without sciatica 12/30/2017     Priority: Medium    Opioid dependence in remission (H) 08/02/2015     Priority: Medium     On suboxone treatement with Bryant Harkins.  (Noted in 2015).        Anxiety attack 07/31/2015     Priority: Medium    Hypokalemia 06/23/2015     Priority: Medium    Tobacco abuse 06/23/2015      Priority: Medium    Hyperlipidemia LDL goal <100 01/29/2014     Priority: Medium    GERD (gastroesophageal reflux disease) 07/28/2010     Priority: Medium     Takes omeprazole and metoclopramide      Restless legs 07/28/2010     Priority: Medium            Medications (include herbals and vitamins):   Any Plavix use in the last 7 days? No     Current Facility-Administered Medications   Medication Dose Route Frequency Provider Last Rate Last Admin    2.0 mL bupivacaine (MARCAINE) 0.5% injection (50 mL vial)  2 mL      2 mL at 08/22/24 1438    2.0 mL bupivacaine (MARCAINE) 0.5% injection (50 mL vial)  2 mL      2 mL at 08/22/24 1438    iohexol (OMNIPAQUE) 300 mg/mL injection 2 mL  2 mL Intrathecal Once King Reynaga MD        lidocaine 1 % injection 2 mL  2 mL      2 mL at 08/22/24 1438    lidocaine 1 % injection 2 mL  2 mL      2 mL at 08/22/24 1438    triamcinolone (KENALOG-40) injection 20 mg  20 mg      20 mg at 08/22/24 1438    triamcinolone (KENALOG-40) injection 20 mg  20 mg      20 mg at 08/22/24 1438     Current Outpatient Medications   Medication Sig Dispense Refill    acetaminophen (TYLENOL) 500 MG tablet Take 1,000 mg by mouth every 6 hours as needed for mild pain      ALPRAZolam (XANAX) 0.5 MG tablet       baclofen (LIORESAL) 20 MG tablet TAKE 1 TABLET (20 MG) BY MOUTH 3 TIMES DAILY 90 tablet 1    bisacodyl (DULCOLAX) 5 MG EC tablet Two days prior to exam take two (2) tablets at 4pm. One day prior to exam take two (2) tablets at 4pm 4 tablet 0    bisacodyl (DULCOLAX) 5 MG EC tablet Take 1 tablet (5 mg) by mouth every other day Take every other day. If no bowel movement for 2 or more days, take 2 tablets of bisacodyl (10 mg) 30 tablet 2    buprenorphine HCl-naloxone HCl (SUBOXONE) 2-0.5 MG per film Place 0.5 Film under the tongue daily       busPIRone HCl (BUSPAR) 30 MG tablet 30 mg 3 times daily      cyclobenzaprine (FLEXERIL) 5 MG tablet TAKE ONE TO TWO TABLETS BY MOUTH THREE TIMES A DAY AS  NEEDED FOR MUSCLE SPASMS 90 tablet 5    diclofenac (VOLTAREN) 1 % topical gel Apply 4 g topically 4 times daily. 350 g 2    DULoxetine (CYMBALTA) 60 MG capsule TAKE ONE CAPSULE BY MOUTH TWICE A  capsule 0    famotidine (PEPCID) 40 MG tablet Take 1 tablet (40 mg) by mouth 2 times daily 60 tablet 1    hyoscyamine (LEVSIN) 0.125 MG tablet Take 1 tablet (125 mcg) by mouth every 6 hours as needed for cramping. 60 tablet 1    lactulose (CONSTULOSE) 10 GM/15ML solution Take 15 mLs (10 g) by mouth 3 times daily as needed for constipation ((as needed for severe constipation, no BM for more than 3 days and feeling constipated).). 300 mL 3    linaclotide (LINZESS) 72 MCG capsule Take 1 capsule (72 mcg) by mouth every morning (before breakfast). 30 capsule 11    medical cannabis (Patient's own supply) See Admin Instructions. Has [place] Medical Cannabis Card- Purchases through [facility].   (The purpose of this order is to document that the patient reports taking medical cannabis.  This is not a prescription, and is not used to certify that the patient has a qualifying medical condition.)      ondansetron (ZOFRAN ODT) 4 MG ODT tab DISSOLVE ONE TABLET BY MOUTH EVERY 6 HOURS AS NEEDED FOR NAUSEA 20 tablet 0    pantoprazole (PROTONIX) 40 MG EC tablet TAKE ONE TABLET BY MOUTH TWICE A DAY WITH MEALS 60 tablet 5    polyethylene glycol (GOLYTELY) 236 g suspension Two days before procedure at 5PM fill first container with water. Mix and drink an 8 oz glass every 15 minutes until HALF of the container is gone. Place the remainder in the refrigerator. One day before procedure at 5PM drink second half of bowel prep. Drink an 8 oz glass every 15 minutes until it is gone. Day of procedure 6 hours before arrival time fill the 2nd container with water. Mix and drink an 8 oz glass every 15 minutes until HALF of the container is gone. Discard the remaining solution. 8000 mL 0    rOPINIRole (REQUIP) 1 MG tablet TAKE ONE TABLET  BY MOUTH EVERY NIGHT AT BEDTIME 90 tablet 0    simvastatin (ZOCOR) 10 MG tablet TAKE ONE TABLET BY MOUTH EVERY NIGHT AT BEDTIME 90 tablet 1    temazepam (RESTORIL) 15 MG capsule                Allergies:      Allergies   Allergen Reactions    Compazine Anxiety and Palpitations     Severe anxiety attack    Droperidol Anxiety and Palpitations     Severe anxiety attack    Nubain [Nalbuphine Hcl] Anxiety and Palpitations     Severe anxiety attack    Ergotamine-Caffeine      12-            GI problems-    Seasonal Allergies     Sumatriptan      vomits after giving herself a shot    Prochlorperazine Anxiety and Palpitations     Uncontrolled movement, severe anxiety attack     Allergy to Latex? No  Allergy to tape?   No  Intolerances:             Physical Exam:   All vitals have been reviewed  No data found.  No intake/output data recorded.  Lungs:   No increased work of breathing, good air exchange, clear to auscultation bilaterally, no crackles or wheezing     Cardiovascular:   Normal apical impulse, regular rate and rhythm, normal S1 and S2, no S3 or S4, and no murmur noted             Lab / Radiology Results:            Anesthetic risk and/or ASA classification:       John Paul Varela MD

## 2025-01-28 ENCOUNTER — ANESTHESIA EVENT (OUTPATIENT)
Dept: GASTROENTEROLOGY | Facility: CLINIC | Age: 50
End: 2025-01-28
Payer: COMMERCIAL

## 2025-01-28 ENCOUNTER — HOSPITAL ENCOUNTER (OUTPATIENT)
Facility: CLINIC | Age: 50
Discharge: HOME | End: 2025-01-28
Attending: INTERNAL MEDICINE | Admitting: INTERNAL MEDICINE
Payer: COMMERCIAL

## 2025-01-28 ENCOUNTER — ANESTHESIA (OUTPATIENT)
Dept: GASTROENTEROLOGY | Facility: CLINIC | Age: 50
End: 2025-01-28
Payer: COMMERCIAL

## 2025-01-28 VITALS
TEMPERATURE: 98.3 F | HEART RATE: 97 BPM | SYSTOLIC BLOOD PRESSURE: 134 MMHG | RESPIRATION RATE: 16 BRPM | DIASTOLIC BLOOD PRESSURE: 80 MMHG | OXYGEN SATURATION: 97 %

## 2025-01-28 LAB — COLONOSCOPY: NORMAL

## 2025-01-28 PROCEDURE — 250N000009 HC RX 250: Performed by: NURSE ANESTHETIST, CERTIFIED REGISTERED

## 2025-01-28 PROCEDURE — 250N000011 HC RX IP 250 OP 636: Performed by: NURSE ANESTHETIST, CERTIFIED REGISTERED

## 2025-01-28 PROCEDURE — 258N000003 HC RX IP 258 OP 636: Performed by: NURSE ANESTHETIST, CERTIFIED REGISTERED

## 2025-01-28 PROCEDURE — 370N000017 HC ANESTHESIA TECHNICAL FEE, PER MIN: Performed by: INTERNAL MEDICINE

## 2025-01-28 PROCEDURE — 88305 TISSUE EXAM BY PATHOLOGIST: CPT | Mod: 26 | Performed by: PATHOLOGY

## 2025-01-28 PROCEDURE — 88305 TISSUE EXAM BY PATHOLOGIST: CPT | Mod: TC | Performed by: INTERNAL MEDICINE

## 2025-01-28 PROCEDURE — 45380 COLONOSCOPY AND BIOPSY: CPT | Performed by: INTERNAL MEDICINE

## 2025-01-28 RX ORDER — PROPOFOL 10 MG/ML
INJECTION, EMULSION INTRAVENOUS PRN
Status: DISCONTINUED | OUTPATIENT
Start: 2025-01-28 | End: 2025-01-28

## 2025-01-28 RX ORDER — LIDOCAINE HYDROCHLORIDE 20 MG/ML
INJECTION, SOLUTION INFILTRATION; PERINEURAL PRN
Status: DISCONTINUED | OUTPATIENT
Start: 2025-01-28 | End: 2025-01-28

## 2025-01-28 RX ORDER — PROPOFOL 10 MG/ML
INJECTION, EMULSION INTRAVENOUS CONTINUOUS PRN
Status: DISCONTINUED | OUTPATIENT
Start: 2025-01-28 | End: 2025-01-28

## 2025-01-28 RX ORDER — DEXAMETHASONE SODIUM PHOSPHATE 10 MG/ML
4 INJECTION, SOLUTION INTRAMUSCULAR; INTRAVENOUS
Status: CANCELLED | OUTPATIENT
Start: 2025-01-28

## 2025-01-28 RX ORDER — ONDANSETRON 4 MG/1
4 TABLET, ORALLY DISINTEGRATING ORAL EVERY 30 MIN PRN
Status: CANCELLED | OUTPATIENT
Start: 2025-01-28

## 2025-01-28 RX ORDER — SODIUM CHLORIDE, SODIUM LACTATE, POTASSIUM CHLORIDE, CALCIUM CHLORIDE 600; 310; 30; 20 MG/100ML; MG/100ML; MG/100ML; MG/100ML
INJECTION, SOLUTION INTRAVENOUS CONTINUOUS
Status: DISCONTINUED | OUTPATIENT
Start: 2025-01-28 | End: 2025-01-28 | Stop reason: HOSPADM

## 2025-01-28 RX ORDER — ONDANSETRON 2 MG/ML
4 INJECTION INTRAMUSCULAR; INTRAVENOUS EVERY 30 MIN PRN
Status: CANCELLED | OUTPATIENT
Start: 2025-01-28

## 2025-01-28 RX ORDER — NALOXONE HYDROCHLORIDE 0.4 MG/ML
0.1 INJECTION, SOLUTION INTRAMUSCULAR; INTRAVENOUS; SUBCUTANEOUS
Status: CANCELLED | OUTPATIENT
Start: 2025-01-28

## 2025-01-28 RX ADMIN — PROPOFOL 80 MG: 10 INJECTION, EMULSION INTRAVENOUS at 13:42

## 2025-01-28 RX ADMIN — LIDOCAINE HYDROCHLORIDE 25 MG: 20 INJECTION, SOLUTION INFILTRATION; PERINEURAL at 13:41

## 2025-01-28 RX ADMIN — PROPOFOL 200 MCG/KG/MIN: 10 INJECTION, EMULSION INTRAVENOUS at 13:42

## 2025-01-28 RX ADMIN — SODIUM CHLORIDE, POTASSIUM CHLORIDE, SODIUM LACTATE AND CALCIUM CHLORIDE: 600; 310; 30; 20 INJECTION, SOLUTION INTRAVENOUS at 13:30

## 2025-01-28 ASSESSMENT — ACTIVITIES OF DAILY LIVING (ADL): ADLS_ACUITY_SCORE: 42

## 2025-01-28 NOTE — ANESTHESIA CARE TRANSFER NOTE
Patient: Radha Beckwith    Procedure: Procedure(s):  COLONOSCOPY, WITH POLYPECTOMY AND BIOPSY       Diagnosis: Abdominal bloating [R14.0]  Abdominal pain, chronic, generalized [R10.84, G89.29]  Diagnosis Additional Information: No value filed.    Anesthesia Type:   MAC     Note:    Oropharynx: oropharynx clear of all foreign objects and spontaneously breathing  Level of Consciousness: awake  Oxygen Supplementation: room air    Independent Airway: airway patency satisfactory and stable  Dentition: dentition unchanged  Vital Signs Stable: post-procedure vital signs reviewed and stable  Report to RN Given: handoff report given  Patient transferred to: Phase II    Handoff Report: Identifed the Patient, Identified the Reponsible Provider, Reviewed the pertinent medical history, Discussed the surgical course, Reviewed Intra-OP anesthesia mangement and issues during anesthesia, Set expectations for post-procedure period and Allowed opportunity for questions and acknowledgement of understanding      Vitals:  Vitals Value Taken Time   /82 01/28/25 1420   Temp     Pulse 98 01/28/25 1420   Resp     SpO2 97 % 01/28/25 1422   Vitals shown include unfiled device data.    Electronically Signed By: JEANNIE Mckeon CRNA  January 28, 2025  2:27 PM

## 2025-01-28 NOTE — DISCHARGE INSTRUCTIONS
[facility]    Home Care Following Endoscopy          Activity:  You have just undergone an endoscopic procedure usually performed with conscious sedation.  Do not work or operate machinery (including a car) for at least 12 hours.    I encourage you to walk and attempt to pass this air as soon as possible.    Diet:  Return to the diet you were on before your procedure but eat lightly for the first 12-24 hours.  Drink plenty of water.  Resume any regular medications unless otherwise advised by your physician.  Please begin any new medication prescribed as a result of your procedure as directed by your physician.   If you had any biopsy or polyp removed please refrain from aspirin or aspirin products for 2 days.  If on Coumadin please restart as instructed by your physician.   Pain:  You may take Tylenol as needed for pain.  Expected Recovery:  You can expect some mild abdominal fullness and/or discomfort due to the air used to inflate your intestinal tract.    Call Your Physician if You Have:  After Colonoscopy:  Worsening persisting abdominal pain which is worse with activity.  Fevers (>101 degrees F), chills or shakes.  Passage of continued blood with bowel movements.     Any questions or concerns about your recovery, please call [phone] or after hours [phone] ([phone]) Nurse Advice Line.    Follow-up Care:  You did have polyps/biopsy tissue sample(s) removed.  The polyps/biopsy tissue sample(s) will be sent to pathology.    You should receive letter in your My Chart from Dr. Varela with your results within 1-2 weeks. If you do not participate in My Chart a physical letter will come in the mail in 2-3 weeks.  Please call if you have not received a notification of your results.  If asked to return to clinic please make an appointment 1 week after your procedure.  Call [phone].

## 2025-01-28 NOTE — ANESTHESIA POSTPROCEDURE EVALUATION
Patient: Radha Beckwith    Procedure: Procedure(s):  COLONOSCOPY, WITH POLYPECTOMY AND BIOPSY       Anesthesia Type:  MAC    Note:  Disposition: Outpatient   Postop Pain Control: Uneventful            Sign Out: Well controlled pain   PONV: No   Neuro/Psych: Uneventful            Sign Out: Acceptable/Baseline neuro status   Airway/Respiratory: Uneventful            Sign Out: Acceptable/Baseline resp. status   CV/Hemodynamics: Uneventful            Sign Out: Acceptable CV status   Other NRE: NONE   DID A NON-ROUTINE EVENT OCCUR? No    Event details/Postop Comments:  Pt was happy with anesthesia care.  No complications.  I will follow up with the pt if needed.           Last vitals:  Vitals Value Taken Time   /82 01/28/25 1420   Temp     Pulse 98 01/28/25 1420   Resp     SpO2 97 % 01/28/25 1422   Vitals shown include unfiled device data.    Electronically Signed By: JEANNIE Mckeon CRNA  January 28, 2025  2:27 PM

## 2025-01-30 LAB
PATH REPORT.COMMENTS IMP SPEC: NORMAL
PATH REPORT.FINAL DX SPEC: NORMAL
PATH REPORT.GROSS SPEC: NORMAL
PATH REPORT.MICROSCOPIC SPEC OTHER STN: NORMAL
PATH REPORT.RELEVANT HX SPEC: NORMAL
PHOTO IMAGE: NORMAL

## 2025-02-02 ENCOUNTER — HEALTH MAINTENANCE LETTER (OUTPATIENT)
Age: 50
End: 2025-02-02

## 2025-02-04 DIAGNOSIS — G89.29 CHRONIC BILATERAL LOW BACK PAIN WITH BILATERAL SCIATICA: ICD-10-CM

## 2025-02-04 DIAGNOSIS — M54.42 CHRONIC BILATERAL LOW BACK PAIN WITH BILATERAL SCIATICA: ICD-10-CM

## 2025-02-04 DIAGNOSIS — M79.18 MYOFASCIAL PAIN: ICD-10-CM

## 2025-02-04 DIAGNOSIS — G89.29 CHRONIC NECK PAIN: ICD-10-CM

## 2025-02-04 DIAGNOSIS — M54.6 CHRONIC BILATERAL THORACIC BACK PAIN: ICD-10-CM

## 2025-02-04 DIAGNOSIS — M54.41 CHRONIC BILATERAL LOW BACK PAIN WITH BILATERAL SCIATICA: ICD-10-CM

## 2025-02-04 DIAGNOSIS — G89.29 CHRONIC BILATERAL THORACIC BACK PAIN: ICD-10-CM

## 2025-02-04 DIAGNOSIS — M54.2 CHRONIC NECK PAIN: ICD-10-CM

## 2025-02-04 NOTE — TELEPHONE ENCOUNTER
Please process a refill of BACLOFEN 20MG TABS  to     [facility] [place]  [place], [place] - [address] [address]  Phone: [phone] Fax: [phone]

## 2025-02-05 RX ORDER — BACLOFEN 20 MG/1
20 TABLET ORAL 3 TIMES DAILY
Qty: 90 TABLET | Refills: 0 | Status: SHIPPED | OUTPATIENT
Start: 2025-02-05

## 2025-02-05 ASSESSMENT — ANXIETY QUESTIONNAIRES
IF YOU CHECKED OFF ANY PROBLEMS ON THIS QUESTIONNAIRE, HOW DIFFICULT HAVE THESE PROBLEMS MADE IT FOR YOU TO DO YOUR WORK, TAKE CARE OF THINGS AT HOME, OR GET ALONG WITH OTHER PEOPLE: EXTREMELY DIFFICULT
GAD7 TOTAL SCORE: 20
6. BECOMING EASILY ANNOYED OR IRRITABLE: MORE THAN HALF THE DAYS
5. BEING SO RESTLESS THAT IT IS HARD TO SIT STILL: NEARLY EVERY DAY
6. BECOMING EASILY ANNOYED OR IRRITABLE: MORE THAN HALF THE DAYS
8. IF YOU CHECKED OFF ANY PROBLEMS, HOW DIFFICULT HAVE THESE MADE IT FOR YOU TO DO YOUR WORK, TAKE CARE OF THINGS AT HOME, OR GET ALONG WITH OTHER PEOPLE?: EXTREMELY DIFFICULT
2. NOT BEING ABLE TO STOP OR CONTROL WORRYING: NEARLY EVERY DAY
7. FEELING AFRAID AS IF SOMETHING AWFUL MIGHT HAPPEN: NEARLY EVERY DAY
IF YOU CHECKED OFF ANY PROBLEMS ON THIS QUESTIONNAIRE, HOW DIFFICULT HAVE THESE PROBLEMS MADE IT FOR YOU TO DO YOUR WORK, TAKE CARE OF THINGS AT HOME, OR GET ALONG WITH OTHER PEOPLE: EXTREMELY DIFFICULT
5. BEING SO RESTLESS THAT IT IS HARD TO SIT STILL: NEARLY EVERY DAY
8. IF YOU CHECKED OFF ANY PROBLEMS, HOW DIFFICULT HAVE THESE MADE IT FOR YOU TO DO YOUR WORK, TAKE CARE OF THINGS AT HOME, OR GET ALONG WITH OTHER PEOPLE?: EXTREMELY DIFFICULT
7. FEELING AFRAID AS IF SOMETHING AWFUL MIGHT HAPPEN: NEARLY EVERY DAY
GAD7 TOTAL SCORE: 20
3. WORRYING TOO MUCH ABOUT DIFFERENT THINGS: NEARLY EVERY DAY
GAD7 TOTAL SCORE: 20
GAD7 TOTAL SCORE: 20
2. NOT BEING ABLE TO STOP OR CONTROL WORRYING: NEARLY EVERY DAY
1. FEELING NERVOUS, ANXIOUS, OR ON EDGE: NEARLY EVERY DAY
3. WORRYING TOO MUCH ABOUT DIFFERENT THINGS: NEARLY EVERY DAY
4. TROUBLE RELAXING: NEARLY EVERY DAY
4. TROUBLE RELAXING: NEARLY EVERY DAY
1. FEELING NERVOUS, ANXIOUS, OR ON EDGE: NEARLY EVERY DAY

## 2025-02-05 NOTE — TELEPHONE ENCOUNTER
Received call from patient  requesting refill(s) for   Baclofen      Last refilled on: Dec. 24, 2024     Patient last seen on: Jan. 16, 2025   Next appt scheduled for: Feb. 12, 2025         E-prescribe to:       [facility], [place] [id number] [facility]     Will facilitate refill.      Lisandra Mason, Clinic Facilitator  [facility] [place]

## 2025-02-05 NOTE — TELEPHONE ENCOUNTER
Request appears appropriate, refill approved for 30 day supply +0 additional fill(s).     Jenny Landrum DNP, APRN, AGNP-C  [facility]

## 2025-02-06 ENCOUNTER — VIRTUAL VISIT (OUTPATIENT)
Dept: PSYCHOLOGY | Facility: CLINIC | Age: 50
End: 2025-02-06
Payer: COMMERCIAL

## 2025-02-06 DIAGNOSIS — F33.1 MODERATE EPISODE OF RECURRENT MAJOR DEPRESSIVE DISORDER (H): Primary | ICD-10-CM

## 2025-02-06 DIAGNOSIS — F41.1 GENERALIZED ANXIETY DISORDER: ICD-10-CM
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
## 2025-02-10 ENCOUNTER — VIRTUAL VISIT (OUTPATIENT)
Dept: PSYCHOLOGY | Facility: CLINIC | Age: 50
End: 2025-02-10
Payer: COMMERCIAL

## 2025-02-10 ENCOUNTER — MYC MEDICAL ADVICE (OUTPATIENT)
Dept: FAMILY MEDICINE | Facility: CLINIC | Age: 50
End: 2025-02-10
Payer: COMMERCIAL

## 2025-02-10 DIAGNOSIS — F33.1 MODERATE EPISODE OF RECURRENT MAJOR DEPRESSIVE DISORDER (H): Primary | ICD-10-CM

## 2025-02-10 DIAGNOSIS — F41.1 GENERALIZED ANXIETY DISORDER: ICD-10-CM

## 2025-02-10 PROCEDURE — 90837 PSYTX W PT 60 MINUTES: CPT | Mod: 95 | Performed by: COUNSELOR

## 2025-02-10 NOTE — TELEPHONE ENCOUNTER
Famotidine 02/19/2020  Cyclobenzaprine 11/18/2015  Ondansetron 1/24/2020  Ropinirole 11/30/2018  Simvastatin 2/14/2019  Pantoprazole 2/1/2023

## 2025-02-10 NOTE — PROGRESS NOTES
[facility]                                     Progress Note    Patient Name: Radha Beckwith  Date: 2/10/25         Service Type: Individual      Session Start Time: 1:30pm Session End Time: 2:30pm     Session Length:  60    Session #: 68    Attendees: Client    Service Modality:  video      Provider verified identity through the following two step process.  Patient provided:  Patient is known previously to provider    Telemedicine Visit: The patient's condition can be safely assessed and treated via synchronous audio and visual telemedicine encounter.      Reason for Telemedicine Visit: Patient convenience (e.g. access to timely appointments / distance to available provider)    Originating Site (Patient Location): Patient's home    Distant Site (Provider Location): Provider Remote Setting- Home Office    Consent:  The patient/guardian has verbally consented to: the potential risks and benefits of telemedicine (video visit) versus in person care; bill my insurance or make self-payment for services provided; and responsibility for payment of non-covered services.     Patient would like the video invitation sent by:  My Chart    Mode of Communication:  video    Distant Location (Provider):  Off-site home office    As the provider I attest to compliance with applicable laws and regulations related to telemedicine.    There has been demonstrated improvement in functioning while patient has been engaged in psychotherapy/psychological service- if withdrawn the patient would deteriorate and/or relapse.      DATA  Interactive Complexity: No  Crisis: No        Progress Since Last Session (Related to Symptoms / Goals / Homework):   Symptoms: No change .    Homework: Completed in session      Episode of Care Goals: Satisfactory progress - MAINTENANCE (Working to maintain change, with risk of relapse); Intervened by continuing to positively reinforce healthy behavior choice      Current / Ongoing  Stressors and Concerns:   Still working on ssdi and has a hearing scheduled for May.    Her best friend is struggling with significant pain which is worrisome for the client.   Client has her spine appointment this week.     Treatment Objective(s) Addressed in This Session:   Increase interest, engagement, and pleasure in doing things  Feel less tired and more energy during the day        Intervention:   Processed emotions (hopelessness, grief, sadness) around relationship status right now. Discussed how her relationship has impacted her relationship with her son. Talked some about her disability process and implementing stress reduction skills daily.      Assessments completed prior to visit:  The following assessments were completed by patient for this visit:  PHQ9:       11/10/2024     7:02 PM 11/20/2024    10:28 AM 12/1/2024     4:41 PM 12/16/2024     1:03 PM 1/6/2025     3:29 PM 1/12/2025     1:19 PM 2/7/2025    12:36 PM   PHQ-9 SCORE   PHQ-9 Total Score MyChart 21 (Severe depression) 23 (Severe depression) 23 (Severe depression) 23 (Severe depression) 24 (Severe depression) 23 (Severe depression)    PHQ-9 Total Score 21  23  23  23  24  23  0       Patient-reported     GAD7:       10/3/2024     1:58 PM 10/27/2024     7:59 PM 11/10/2024     7:03 PM 12/1/2024     4:42 PM 12/16/2024     1:05 PM 1/6/2025     3:30 PM 2/5/2025     7:35 PM   BO-7 SCORE   Total Score 20 (severe anxiety) 20 (severe anxiety) 19 (severe anxiety) 20 (severe anxiety) 18 (severe anxiety) 21 (severe anxiety) 20 (severe anxiety)   Total Score 20    20 20  19  20  18  21  20        Patient-reported     PROMIS 10-Global Health (all questions and answers displayed):       8/4/2024     7:20 PM 8/10/2024     2:51 PM 8/18/2024    11:36 AM 8/28/2024     2:07 PM 12/1/2024     4:43 PM 12/16/2024     1:07 PM 1/6/2025     3:31 PM   PROMIS 10   In general, would you say your health is: Poor Fair Poor Poor Poor Poor Poor   In general, would you say your  quality of life is: Poor Poor Fair Fair Poor Poor Poor   In general, how would you rate your physical health? Poor Poor Poor Fair Poor Poor Fair   In general, how would you rate your mental health, including your mood and your ability to think? Poor Poor Poor Poor Poor Poor Poor   In general, how would you rate your satisfaction with your social activities and relationships? Fair Poor Fair Fair Poor Fair Fair   In general, please rate how well you carry out your usual social activities and roles Poor Poor Fair Fair Poor Poor Poor   To what extent are you able to carry out your everyday physical activities such as walking, climbing stairs, carrying groceries, or moving a chair? Moderately A little A little A little A little A little A little   In the past 7 days, how often have you been bothered by emotional problems such as feeling anxious, depressed, or irritable? Always Always Always Always Always Always Always   In the past 7 days, how would you rate your fatigue on average? Severe Very severe Very severe Severe Severe Very severe Very severe   In the past 7 days, how would you rate your pain on average, where 0 means no pain, and 10 means worst imaginable pain? 7 7 7 7 8 7 8   In general, would you say your health is: 1 2 1 1 1 1 1   In general, would you say your quality of life is: 1 1 2 2 1 1 1   In general, how would you rate your physical health? 1 1 1 2 1 1 2   In general, how would you rate your mental health, including your mood and your ability to think? 1 1 1 1 1 1 1   In general, how would you rate your satisfaction with your social activities and relationships? 2 1 2 2 1 2 2   In general, please rate how well you carry out your usual social activities and roles. (This includes activities at home, at work and in your community, and responsibilities as a parent, child, spouse, employee, friend, etc.) 1 1 2 2 1 1 1   To what extent are you able to carry out your everyday physical activities such as  walking, climbing stairs, carrying groceries, or moving a chair? 3 2 2 2 2 2 2   In the past 7 days, how often have you been bothered by emotional problems such as feeling anxious, depressed, or irritable? 5 5 5 5 5 5 5   In the past 7 days, how would you rate your fatigue on average? 4 5 5 4 4 5 5   In the past 7 days, how would you rate your pain on average, where 0 means no pain, and 10 means worst imaginable pain? 7 7 7 7 8 7 8   Global Mental Health Score 5 4    4 6 6 4  5  5    Global Physical Health Score 8 6    6 6 8 7  6  7    PROMIS TOTAL - SUBSCORES 13 10    10 12 14 11  11  12        Patient-reported     [place] Suicide Severity Rating Scale (Lifetime/Recent)      8/31/2024    12:07 PM 9/5/2024     5:33 PM 9/13/2024    10:52 AM 10/4/2024     4:54 PM 11/22/2024    11:34 AM 12/25/2024    11:30 PM 1/28/2025     1:23 PM   [place] Suicide Severity Rating (Lifetime/Recent)   Q1 Wished to be Dead (Past Month) 0-->no 0-->no 0-->no 0-->no 0-->no 0-->no 0-->no   Q2 Suicidal Thoughts (Past Month) 0-->no 0-->no 0-->no 0-->no 0-->no 0-->no 0-->no   Q6 Suicide Behavior (Lifetime) 0-->no 1-->yes 0-->no 0-->no 0-->no 0-->no 0-->no   If yes to Q6, within past 3 months?  0-->no        Level of Risk per Screen no risks indicated moderate risk no risks indicated no risks indicated no risks indicated no risks indicated no risks indicated         ASSESSMENT: Current Emotional / Mental Status (status of significant symptoms):   Risk status (Self / Other harm or suicidal ideation)   Patient denies current fears or concerns for personal safety.   Patient denies current or recent suicidal ideation or behaviors.   Patient denies current or recent homicidal ideation or behaviors.   Patient denies current or recent self injurious behavior or ideation.   Patient denies other safety concerns.   Patient reports there has been no change in risk factors since their last session.     Patient reports there has been no change in  protective factors since their last session.     Recommended that patient call 911 or go to the local ED should there be a change in any of these risk factors.     Appearance:   Appropriate    Eye Contact:   Good    Psychomotor Behavior: Normal    Attitude:   Cooperative    Orientation:   All   Speech    Rate / Production: Normal/ Responsive Normal     Volume:  Normal    Mood:    Normal   Affect:    Appropriate    Thought Content:  Clear    Thought Form:  Coherent    Insight:    Good      Medication Review:   No changes to current psychiatric medication(s)     Medication Compliance:   Yes     Changes in Health Issues:   None reported     Chemical Use Review:   Substance Use: Chemical use reviewed, no active concerns identified      Tobacco Use: No change in amount of tobacco use since last session.  Patient declined discussion at this time    Diagnosis:  1. Moderate episode of recurrent major depressive disorder (H)    2. Generalized anxiety disorder            Collateral Reports Completed:   Not Applicable    PLAN: (Patient Tasks / Therapist Tasks / Other)  Continue to use stress reduction skills daily.               Ricarda Bhatia, [facility]                                                         ______________________________________________________________________    Individual Treatment Plan    Patient's Name: Radha Beckwith  YOB: 1975    Date of Creation: 6/28/23  Date Treatment Plan Last Reviewed/Revised: 12/2/24    DSM5 Diagnoses: 296.32 (F33.1) Major Depressive Disorder, Recurrent Episode, Moderate _  Psychosocial / Contextual Factors: living with boyfriend, memory issues  PROMIS (reviewed every 90 days):     Referral / Collaboration:  Referral to another professional/service is not indicated at this time..    Anticipated number of session for this episode of care: 9-12 sessions  Anticipation frequency of session:  as needed  Anticipated Duration of each session: 38-52 minutes  Treatment  plan will be reviewed in 90 days or when goals have been changed.       MeasurableTreatment Goal(s) related to diagnosis / functional impairment(s)  Goal 1: Patient will increase communication skills with family members.    Objective #A (Patient Action)    Patient will learn & utilize at least 2 assertive communication skills weekly.  Status: Continued - Date(s): 12/2/24    Intervention(s)  Therapist will teach emotional regulation skills. distress tolerance skills, interpersonal effectiveness skills, emotion regluation skills, mindfulness skills, radical acceptance. Therapist will teach client how to ID body cues for anxiety, anxiety reduction techniques, how to ID triggers for depression and anxiety- decrease reactivity/eliminate, lifestyle changes to reduce depression and anxiety, communication skills, explore cognitive beliefs and help client to develop healthy cognitive patterns and beliefs.    Objective #B  Patient will use thought-stopping strategy daily to reduce intrusive thoughts.  Status: Continued - Date(s): 12/2/24    Intervention(s)  Therapist will teach emotional regulation skills. distress tolerance skills, interpersonal effectiveness skills, emotion regluation skills, mindfulness skills, radical acceptance. Therapist will teach client how to ID body cues for anxiety, anxiety reduction techniques, how to ID triggers for depression and anxiety- decrease reactivity/eliminate, lifestyle changes to reduce depression and anxiety, communication skills, explore cognitive beliefs and help client to develop healthy cognitive patterns and beliefs.      Goal 2: Patient will decrease depression symptoms.      Objective #A (Patient Action)    Status: Continued - Date(s): 12/2/24    Patient will Increase interest, engagement, and pleasure in doing things  Decrease frequency and intensity of feeling down, depressed, hopeless  Improve quantity and quality of night time sleep / decrease daytime naps  Feel less tired  and more energy during the day   Improve diet, appetite, mindful eating, and / or meal planning  Identify negative self-talk and behaviors: challenge core beliefs, myths, and actions  Improve concentration, focus, and mindfulness in daily activities   Feel less fidgety, restless or slow in daily activities / interpersonal interactions.    Intervention(s)  Therapist will teach emotional regulation skills. distress tolerance skills, interpersonal effectiveness skills, emotion regluation skills, mindfulness skills, radical acceptance. Therapist will teach client how to ID body cues for anxiety, anxiety reduction techniques, how to ID triggers for depression and anxiety- decrease reactivity/eliminate, lifestyle changes to reduce depression and anxiety, communication skills, explore cognitive beliefs and help client to develop healthy cognitive patterns and beliefs.    Objective #B  Patient will identify three distraction and diversion activities and use those activities to decrease level of anxiety  .    Status: Continued - Date(s):  12/2/24    Intervention(s)  Therapist will teach emotional regulation skills. distress tolerance skills, interpersonal effectiveness skills, emotion regluation skills, mindfulness skills, radical acceptance. Therapist will teach client how to ID body cues for anxiety, anxiety reduction techniques, how to ID triggers for depression and anxiety- decrease reactivity/eliminate, lifestyle changes to reduce depression and anxiety, communication skills, explore cognitive beliefs and help client to develop healthy cognitive patterns and beliefs.      Patient has reviewed and agreed to the above plan.      Ricarda Bhatia [facility]

## 2025-02-11 DIAGNOSIS — K21.00 GASTROESOPHAGEAL REFLUX DISEASE WITH ESOPHAGITIS WITHOUT HEMORRHAGE: ICD-10-CM

## 2025-02-11 DIAGNOSIS — K59.03 CONSTIPATION DUE TO OPIOID THERAPY: ICD-10-CM

## 2025-02-11 DIAGNOSIS — T40.2X5A CONSTIPATION DUE TO OPIOID THERAPY: ICD-10-CM

## 2025-02-11 DIAGNOSIS — E78.2 MIXED HYPERLIPIDEMIA: ICD-10-CM

## 2025-02-11 RX ORDER — SIMVASTATIN 10 MG
10 TABLET ORAL AT BEDTIME
Qty: 90 TABLET | Refills: 1 | Status: SHIPPED | OUTPATIENT
Start: 2025-02-11

## 2025-02-11 ASSESSMENT — PAIN SCALES - PAIN ENJOYMENT GENERAL ACTIVITY SCALE (PEG)
AVG_PAIN_PASTWEEK: 7
INTERFERED_GENERAL_ACTIVITY: 9
INTERFERED_ENJOYMENT_LIFE: 9
PEG_TOTALSCORE: 8.33

## 2025-02-11 NOTE — PROGRESS NOTES
[facility] Pain Management     Date of visit: 2/12/2025      Assessment:   Radha Beckwith is a 49 year old female with a past medical history significant for chronic bilateral low back pain, depression, SVT, opioid dependence in remission, anxiety, tobacco use, GERD, restless legs who presents in follow up for chronic pain.      Low back pain - Onset of pain within last 2 years. Pain is mostly axial, though she does have intermittent radicular leg symptoms, occasionally extends below the knee. On exam, positive facet loading and positive KATHY, sacral thrust and Yeoman's bilaterally. Positive myofascial TTP, negative SLR and neuro exam WNL. Etiology is likely associated with multiple factors, including underlying degenerative changes of lumbar spine, facet and SIJ mediated components, with prominent overlying myofascial component.     Neck/thoracic back pain - Onset of pain within last 2 years. On exam, positive for myofascial TTP, most notably in lower thoracic paraspinals. Etiology is likely associated with multiple factors, including underlying degenerative changes, with prominent overlying myofascial component.      Assigned to [place] nursing team.      Visit Diagnoses:  1. Chronic pain syndrome    2. SI (sacroiliac) joint dysfunction    3. Chronic bilateral thoracic back pain    4. Pain of both sacroiliac joints    5. Chronic neck pain    6. Facet arthropathy, cervical    7. Multiple joint pain    8. Myofascial pain    9. Muscle spasm        Plan:  Diagnosis reviewed, treatment option addressed, and risk/benifits discussed.  Self-care instructions given.  I am recommending a multidisciplinary treatment plan to help this patient better manage their pain.      Pain Physical Therapy:  Prior PT for low back/SI and neck pain. Continues home exercise program.      Pain Psychology: Previously worked with Karen Kohler PhD, last visit on 9/30/24.      Diagnostic Studies:  Rheum labs ordered today for  further workup of widespread pain symptoms, myalgia and polyarthralgia.      Medication Management:   Hyoscyamine 0.125 mg twice daily as needed for abdominal pain. Has been helpful for her, no side effects. Uses consistently twice daily.   Duloxetine -  Current dose 60 mg twice daily. No reported side effects. Will reassess benefit at follow up.   Muscle relaxants:   Will taper off baclofen before starting new muscle relaxant. Currently takes 20 mg BID. Recommend 20 mg daily x 5 days, then off.   Orphenadrine 100 mg BID PRN prescribed today. Appreciated benefit when given in ED. Cautioned about potential CNS side effects.   Medical [place] - [place]24. No clear benefit for pain with daytime products, some benefit at night with products used at bedtime. Could consider re-certification in future if interested.      Potential procedures:   Bilateral SI joint injections completed on 1/16/25. Will reassess benefit at follow up.   Bilateral cervical 3,4,5 medial branch nerve RFA on 12/17/24. Will reassess benefit at follow up.   Abdominal wall trigger point injections recommended by Dr. Dozier (OV on 11/11/24), completed on 11/25/24. Marginal benefit.      Other Therapies/Orders/Referrals:   Labs may be completed at any [facility] location      Follow up with JEANNIE Schroeder CNP around 6-8 weeks     Review of Electronic Chart: Today I have also reviewed available medical information in the patient's medical record at [facility] and Care Everywhere (if available), including relevant provider notes, laboratory work, and imaging.     Jenny Landrum DNP, JEANNIE, AGNP-C  [facility] Pain Management     -------------------------------------------------    Subjective:    Chief complaint:   Chief Complaint   Patient presents with    Pain       Interval history:  Radha Beckwith is a 49 year old female last seen on 11/14/24.      Recommendations/plan at the last visit  included:  Pain Physical Therapy:  Prior PT for low back/SI and neck pain. Continues home exercise program.      Pain Psychology: Previously worked with Karen Kohler PhD, last visit on 9/30/24.      Diagnostic Studies:  No new orders today.      Medication Management:   Trial hyoscyamine 0.125 mg twice daily as needed for abdominal pain. Cautioned about potential CNS and anticholinergic effects. Pending response, may consider dicyclomine alternatively in between visits if needed.   Duloxetine -  Current dose 60 mg twice daily. No reported side effects. Will discuss outcome with last dose adjustment at future follow up.   Muscle relaxants:   Continue baclofen 20 mg 3 times daily.  Reports enhanced effect with adding afternoon dose, mild daytime sedation effects are manageable. Recommended taking baclofen and buspirone at separate times.   Flexeril - 5-10 mg twice daily as needed. PCP managing. Recommend reducing use to once daily as needed if helpful on days with increased pain.   Allow 4-6 hours in between all muscle relaxant doses, do not mix with alcohol.   [place] - [place] at [place] on 1/3/24. No clear benefit for pain with daytime products, some benefit at night with products used at bedtime.       Potential procedures:   Bilateral SI joint injections recommended at prior visit and again today. Orders placed again today to schedule with Dr. Reynaga.   Bilateral cervical 3,4,5 medial branch nerve RFA scheduled on 12/17/24.   Abdominal wall trigger point injections recommended by Dr. Dozier (recent OV on 11/11). Case discussed with romario Edgar to double book. Order placed for scheduling.      Other Therapies/Orders/Referrals:   TENS unit - continue use throughout day as needed.      Follow up with JEANNIE Schroeder CNP around 3-4 weeks after SI joint injections    Since her last visit, Radha Beckwith reports:  She would like to address the following:    -Alternative muscle relaxant, orphenadrine. Currently taking Flexeril and baclofen.   -She does not think baclofen is working. Baclofen 20 mg BID consistently. She would like to taper off and trial orphenadrine.   -Hyoscyamine has been helpful, takes twice daily. Stomach cramping has improved with this and would like to continue.   -She recently had spinal MRIs, some changes to thoracic spine.   -She has hx of elevated CRP, mildly but persistent.   -She has not had labs for rheum workup.     PEG: A Three-Item Scale Assessing Pain Intensity and Interference    What number best describes your PAIN ON AVERAGE in the past week? (Patient-Rptd) 7    What number best describes how, during the past week, pain has interfered with your ENJOYMENT OF LIFE? (Patient-Rptd) 9    What number best describes how, during the past week, pain has interfered with your GENERAL ACTIVITY? (Patient-Rptd) 9    PEG Total Score: (Patient-Rptd) 8.33    Fabian VERA, Hayden KA, Karie MJ, Anjana TA, Henri J, Joyce CHI, Ban SM, Brandie K. Development and initial validation of the PEG, a 3-item scale assessing pain intensity and interference. Journal of General Internal Medicine. 2009 [place];24:733-738.        HPI/Interval Hx from last visit:  -She reports abdominal pain is more severe now. Primarily in the epigastric and radiates bilaterally.   -She reports being advised that her abdominal pain is coming from the back.   -She reports midline low back pain that seems axial in nature.   -We had discussed SI joint at prior injections, she would like to try schedule with Dr. Reynaga.   -Dr. Dozier recently gave her prescription for oxycodone   -She asks for refill of oxy today, though I am not agreeable.   -She would like to schedule abdominal TPI as soon as possible, willing to go to [facility] if needed.       Current Pain Treatments:                  Suboxone 2-0.5 mg daily (weaning off)              Ropinirole 1 mg at bedtime               Hyoscyamine 0.125  mg BID PRN   Orphenadrine 100 mg BID PRN              Duloxetine 60 mg BID                 TENS              PT - HEP              CMBB/RFA on 12/17/24              Bilateral SI joint injections on 1/16/25        Current MME: N/A     Review of [place] Prescription Monitoring Program (): YES      Past pain treatments:  SI joint injection 5/16/23 -significant benefit x1 month, then diminishing efficacy  KINDRA on 4/19/24 with Dr. Ivory - marginal benefit   Gabapentin   Nortriptyline 10-20 mg at bedtime      Medications:  Current Outpatient Medications   Medication Sig Dispense Refill    orphenadrine ER (NORFLEX) 100 MG 12 hr tablet Take 1 tablet (100 mg) by mouth 2 times daily as needed for muscle spasms or moderate to severe pain. 60 tablet 1    acetaminophen (TYLENOL) 500 MG tablet Take 1,000 mg by mouth every 6 hours as needed for mild pain      ALPRAZolam (XANAX) 0.5 MG tablet       bisacodyl (DULCOLAX) 5 MG EC tablet Take 1 tablet (5 mg) by mouth every other day. Take every other day. If no bowel movement for 2 or more days, take 2 tablets of bisacodyl (10 mg) 30 tablet 2    bisacodyl (DULCOLAX) 5 MG EC tablet Two days prior to exam take two (2) tablets at 4pm. One day prior to exam take two (2) tablets at 4pm 4 tablet 0    buprenorphine HCl-naloxone HCl (SUBOXONE) 2-0.5 MG per film Place 0.5 Film under the tongue daily       busPIRone HCl (BUSPAR) 30 MG tablet 30 mg 3 times daily      diclofenac (VOLTAREN) 1 % topical gel Apply 4 g topically 4 times daily. 350 g 2    DULoxetine (CYMBALTA) 60 MG capsule TAKE ONE CAPSULE BY MOUTH TWICE A  capsule 0    famotidine (PEPCID) 40 MG tablet Take 1 tablet (40 mg) by mouth 2 times daily 60 tablet 1    hyoscyamine (LEVSIN) 0.125 MG tablet Take 1 tablet (125 mcg) by mouth every 6 hours as needed for cramping. 60 tablet 5    lactulose (CONSTULOSE) 10 GM/15ML solution Take 15 mLs (10 g) by mouth 3 times daily as needed for constipation ((as needed for severe  constipation, no BM for more than 3 days and feeling constipated).). 300 mL 3    linaclotide (LINZESS) 145 MCG capsule Take 1 capsule (145 mcg) by mouth every morning (before breakfast). 30 capsule 5    linaclotide (LINZESS) 72 MCG capsule Take 1 capsule (72 mcg) by mouth every morning (before breakfast). 30 capsule 11    medical cannabis (Patient's own supply) See Admin Instructions. [place] Medical [place] Card- Purchases through [facility].   (The purpose of this order is to document that the patient reports taking medical cannabis.  This is not a prescription, and is not used to certify that the patient has a qualifying medical condition.)      ondansetron (ZOFRAN ODT) 4 MG ODT tab DISSOLVE ONE TABLET BY MOUTH EVERY 6 HOURS AS NEEDED FOR NAUSEA 20 tablet 0    pantoprazole (PROTONIX) 40 MG EC tablet TAKE ONE TABLET BY MOUTH TWICE A DAY WITH MEALS 60 tablet 5    polyethylene glycol (GOLYTELY) 236 g suspension Two days before procedure at 5PM fill first container with water. Mix and drink an 8 oz glass every 15 minutes until HALF of the container is gone. Place the remainder in the refrigerator. One day before procedure at 5PM drink second half of bowel prep. Drink an 8 oz glass every 15 minutes until it is gone. Day of procedure 6 hours before arrival time fill the 2nd container with water. Mix and drink an 8 oz glass every 15 minutes until HALF of the container is gone. Discard the remaining solution. 8000 mL 0    rOPINIRole (REQUIP) 1 MG tablet TAKE ONE TABLET BY MOUTH EVERY NIGHT AT BEDTIME 90 tablet 0    simvastatin (ZOCOR) 10 MG tablet TAKE ONE TABLET BY MOUTH EVERY NIGHT AT BEDTIME 90 tablet 1    temazepam (RESTORIL) 15 MG capsule          Medical History: any changes in medical history since they were last seen? Yes - ED visit on 12/25/24, see chart       Objective:    Physical Exam:  Blood pressure (!) 177/112, pulse 114, not currently breastfeeding.  Constitutional: Well developed, well  nourished, appears stated age.  Gait: Intact   HEENT: Head atraumatic, normocephalic. Eyes without conjunctival injection or jaundice. Oropharynx clear. Neck supple. No obvious neck masses.  Skin: No rash, lesions, or petechiae of exposed skin.   Psychiatric/mental status: Alert, without lethargy or stupor. Speech fluent. Appropriate affect. Mood normal. Able to follow commands without difficulty.     Diagnostic Tests/Imaging/Labs:  Reviewed last [facility]    BILLING TIME DOCUMENTATION:   The total TIME spent on this patient on the date of the encounter/appointment was 57 minutes.      TOTAL TIME includes:   Time spent preparing to see the patient (reviewing records and tests)   Time spent face to face (or over the phone) with the patient   Time spent ordering tests, medications, procedures and referrals   Time spent Referring and communicating with other healthcare professionals   Time spent documenting clinical information in Epic

## 2025-02-12 ENCOUNTER — OFFICE VISIT (OUTPATIENT)
Dept: PALLIATIVE MEDICINE | Facility: CLINIC | Age: 50
End: 2025-02-12
Payer: COMMERCIAL

## 2025-02-12 VITALS — SYSTOLIC BLOOD PRESSURE: 177 MMHG | DIASTOLIC BLOOD PRESSURE: 112 MMHG | HEART RATE: 114 BPM

## 2025-02-12 DIAGNOSIS — M54.6 CHRONIC BILATERAL THORACIC BACK PAIN: ICD-10-CM

## 2025-02-12 DIAGNOSIS — M79.18 MYOFASCIAL PAIN: ICD-10-CM

## 2025-02-12 DIAGNOSIS — M53.3 PAIN OF BOTH SACROILIAC JOINTS: ICD-10-CM

## 2025-02-12 DIAGNOSIS — G89.4 CHRONIC PAIN SYNDROME: Primary | ICD-10-CM

## 2025-02-12 DIAGNOSIS — M62.838 MUSCLE SPASM: ICD-10-CM

## 2025-02-12 DIAGNOSIS — M54.2 CHRONIC NECK PAIN: ICD-10-CM

## 2025-02-12 DIAGNOSIS — M25.50 MULTIPLE JOINT PAIN: ICD-10-CM

## 2025-02-12 DIAGNOSIS — M53.3 SI (SACROILIAC) JOINT DYSFUNCTION: ICD-10-CM

## 2025-02-12 DIAGNOSIS — G89.29 CHRONIC BILATERAL THORACIC BACK PAIN: ICD-10-CM

## 2025-02-12 DIAGNOSIS — G89.29 CHRONIC NECK PAIN: ICD-10-CM

## 2025-02-12 DIAGNOSIS — M47.812 FACET ARTHROPATHY, CERVICAL: ICD-10-CM

## 2025-02-12 PROBLEM — K59.03 DRUG INDUCED CONSTIPATION: Status: ACTIVE | Noted: 2024-06-25

## 2025-02-12 PROBLEM — F51.5 NIGHTMARES ASSOCIATED WITH CHRONIC POST-TRAUMATIC STRESS DISORDER: Status: ACTIVE | Noted: 2024-05-01

## 2025-02-12 PROBLEM — H93.19 RINGING IN EARS: Status: ACTIVE | Noted: 2023-07-01

## 2025-02-12 PROBLEM — N64.4 MASTALGIA IN FEMALE: Status: ACTIVE | Noted: 2024-07-23

## 2025-02-12 PROBLEM — F40.10 SOCIAL ANXIETY DISORDER: Status: ACTIVE | Noted: 2024-05-01

## 2025-02-12 PROBLEM — R10.2 FEMALE PELVIC PAIN: Status: ACTIVE | Noted: 2024-07-23

## 2025-02-12 PROBLEM — F43.12 NIGHTMARES ASSOCIATED WITH CHRONIC POST-TRAUMATIC STRESS DISORDER: Status: ACTIVE | Noted: 2024-05-01

## 2025-02-12 PROCEDURE — 99215 OFFICE O/P EST HI 40 MIN: CPT

## 2025-02-12 PROCEDURE — 99417 PROLNG OP E/M EACH 15 MIN: CPT

## 2025-02-12 RX ORDER — BISACODYL 5 MG/1
5 TABLET, DELAYED RELEASE ORAL EVERY OTHER DAY
Qty: 30 TABLET | Refills: 2 | Status: SHIPPED | OUTPATIENT
Start: 2025-02-12

## 2025-02-12 RX ORDER — ORPHENADRINE CITRATE 100 MG/1
100 TABLET ORAL 2 TIMES DAILY PRN
Qty: 60 TABLET | Refills: 1 | Status: SHIPPED | OUTPATIENT
Start: 2025-02-12

## 2025-02-12 ASSESSMENT — PAIN SCALES - GENERAL: PAINLEVEL_OUTOF10: SEVERE PAIN (8)

## 2025-02-12 NOTE — PATIENT INSTRUCTIONS
Reduce baclofen to 20 mg daily x 5 days, then discontinue. Wait 24 hours, then start orphenadrine 100 mg twice daily as needed. Allow about 12 hours in between doses.   Continue hyoscyamine two daily as needed.   Labs ordered today for further workup of widespread joint and myofascial pain. May be completed at any [facility] lab.  Follow up around 6-8 weeks.     ----------------------------------------------------------------  Clinic Number:  [phone]   Call with any questions about your care and for scheduling assistance.   Calls are returned Monday through Friday between 8 AM and 4:30 PM. We usually get back to you within 2 business days depending on the issue/request.    If we are prescribing your medications:  For opioid medication refills, call the clinic or send a [employer] message 7 days in advance.  Please include:  Name of requested medication  Name of the pharmacy.  For non-opioid medications, call your pharmacy directly to request a refill. Please allow 3-4 days to be processed.   Per [place] State Law:  All controlled substance prescriptions must be filled within 30 days of being written.    For those controlled substances allowing refills, pickup must occur within 30 days of last fill.      We believe regular attendance is key to your success in our program!    Any time you are unable to keep your appointment we ask that you call us at least 24 hours in advance to cancel.This will allow us to offer the appointment time to another patient.   Multiple missed appointments may lead to dismissal from the clinic.

## 2025-02-13 ENCOUNTER — PATIENT OUTREACH (OUTPATIENT)
Dept: CARE COORDINATION | Facility: CLINIC | Age: 50
End: 2025-02-13
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
## 2025-02-17 ENCOUNTER — VIRTUAL VISIT (OUTPATIENT)
Dept: PSYCHOLOGY | Facility: CLINIC | Age: 50
End: 2025-02-17
Payer: COMMERCIAL

## 2025-02-17 ENCOUNTER — LAB (OUTPATIENT)
Dept: LAB | Facility: CLINIC | Age: 50
End: 2025-02-17
Payer: COMMERCIAL

## 2025-02-17 DIAGNOSIS — F41.1 GENERALIZED ANXIETY DISORDER: ICD-10-CM

## 2025-02-17 DIAGNOSIS — M25.50 MULTIPLE JOINT PAIN: ICD-10-CM

## 2025-02-17 DIAGNOSIS — F33.1 MODERATE EPISODE OF RECURRENT MAJOR DEPRESSIVE DISORDER (H): Primary | ICD-10-CM

## 2025-02-17 LAB
CRP SERPL-MCNC: 3.01 MG/L
ERYTHROCYTE [SEDIMENTATION RATE] IN BLOOD BY WESTERGREN METHOD: 1 MM/HR (ref 0–30)
RHEUMATOID FACT SERPL-ACNC: <10 IU/ML

## 2025-02-17 PROCEDURE — 86431 RHEUMATOID FACTOR QUANT: CPT

## 2025-02-17 PROCEDURE — 86200 CCP ANTIBODY: CPT

## 2025-02-17 PROCEDURE — 86038 ANTINUCLEAR ANTIBODIES: CPT

## 2025-02-17 PROCEDURE — 86140 C-REACTIVE PROTEIN: CPT

## 2025-02-17 PROCEDURE — 90834 PSYTX W PT 45 MINUTES: CPT | Mod: 95 | Performed by: COUNSELOR

## 2025-02-17 PROCEDURE — 85652 RBC SED RATE AUTOMATED: CPT

## 2025-02-17 PROCEDURE — 36415 COLL VENOUS BLD VENIPUNCTURE: CPT

## 2025-02-17 NOTE — PROGRESS NOTES
Answers submitted by the patient for this visit:  Patient Health Questionnaire (Submitted on 2/17/2025)  If you checked off any problems, how difficult have these problems made it for you to do your work, take care of things at home, or get along with other people?: Extremely difficult  PHQ9 TOTAL SCORE: 23        [facility]                                     Progress Note    Patient Name: Radha Beckwith  Date: 2/17/25         Service Type: Individual      Session Start Time: 1:35pm Session End Time: 2:25pm     Session Length:  50    Session #: 69    Attendees: Client    Service Modality:  video      Provider verified identity through the following two step process.  Patient provided:  Patient is known previously to provider    Telemedicine Visit: The patient's condition can be safely assessed and treated via synchronous audio and visual telemedicine encounter.      Reason for Telemedicine Visit: Patient convenience (e.g. access to timely appointments / distance to available provider)    Originating Site (Patient Location): Patient's home    Distant Site (Provider Location): Provider Remote Setting- Home Office    Consent:  The patient/guardian has verbally consented to: the potential risks and benefits of telemedicine (video visit) versus in person care; bill my insurance or make self-payment for services provided; and responsibility for payment of non-covered services.     Patient would like the video invitation sent by:  My Chart    Mode of Communication:  video    Distant Location (Provider):  Off-site home office    As the provider I attest to compliance with applicable laws and regulations related to telemedicine.    There has been demonstrated improvement in functioning while patient has been engaged in psychotherapy/psychological service- if withdrawn the patient would deteriorate and/or relapse.      DATA  Interactive Complexity: No  Crisis: No        Progress Since Last Session  (Related to Symptoms / Goals / Homework):   Symptoms: No change .    Homework: Completed in session      Episode of Care Goals: Satisfactory progress - MAINTENANCE (Working to maintain change, with risk of relapse); Intervened by continuing to positively reinforce healthy behavior choice      Current / Ongoing Stressors and Concerns:   Got the okay to sell her blood now which takes away some financial stress.   Continued stress from physical health issues with no resolve.      Treatment Objective(s) Addressed in This Session:   Increase interest, engagement, and pleasure in doing things  Feel less tired and more energy during the day        Intervention:   Processed emotions around physical health issues. Reinforced client's efforts to continue looking for healing outside of her doctors. Discussed stressors related to ssdi process.      Assessments completed prior to visit:  The following assessments were completed by patient for this visit:  PHQ9:       11/20/2024    10:28 AM 12/1/2024     4:41 PM 12/16/2024     1:03 PM 1/6/2025     3:29 PM 1/12/2025     1:19 PM 2/7/2025    12:36 PM 2/17/2025    12:20 PM   PHQ-9 SCORE   PHQ-9 Total Score MyChart 23 (Severe depression) 23 (Severe depression) 23 (Severe depression) 24 (Severe depression) 23 (Severe depression)  23 (Severe depression)   PHQ-9 Total Score 23  23  23  24  23  0 23        Patient-reported     GAD7:       10/3/2024     1:58 PM 10/27/2024     7:59 PM 11/10/2024     7:03 PM 12/1/2024     4:42 PM 12/16/2024     1:05 PM 1/6/2025     3:30 PM 2/5/2025     7:35 PM   BO-7 SCORE   Total Score 20 (severe anxiety) 20 (severe anxiety) 19 (severe anxiety) 20 (severe anxiety) 18 (severe anxiety) 21 (severe anxiety) 20 (severe anxiety)   Total Score 20    20 20  19  20  18  21  20        Patient-reported     PROMIS 10-Global Health (all questions and answers displayed):       8/4/2024     7:20 PM 8/10/2024     2:51 PM 8/18/2024    11:36 AM 8/28/2024     2:07 PM  12/1/2024     4:43 PM 12/16/2024     1:07 PM 1/6/2025     3:31 PM   PROMIS 10   In general, would you say your health is: Poor Fair Poor Poor Poor Poor Poor   In general, would you say your quality of life is: Poor Poor Fair Fair Poor Poor Poor   In general, how would you rate your physical health? Poor Poor Poor Fair Poor Poor Fair   In general, how would you rate your mental health, including your mood and your ability to think? Poor Poor Poor Poor Poor Poor Poor   In general, how would you rate your satisfaction with your social activities and relationships? Fair Poor Fair Fair Poor Fair Fair   In general, please rate how well you carry out your usual social activities and roles Poor Poor Fair Fair Poor Poor Poor   To what extent are you able to carry out your everyday physical activities such as walking, climbing stairs, carrying groceries, or moving a chair? Moderately A little A little A little A little A little A little   In the past 7 days, how often have you been bothered by emotional problems such as feeling anxious, depressed, or irritable? Always Always Always Always Always Always Always   In the past 7 days, how would you rate your fatigue on average? Severe Very severe Very severe Severe Severe Very severe Very severe   In the past 7 days, how would you rate your pain on average, where 0 means no pain, and 10 means worst imaginable pain? 7 7 7 7 8 7 8   In general, would you say your health is: 1 2 1 1 1 1 1   In general, would you say your quality of life is: 1 1 2 2 1 1 1   In general, how would you rate your physical health? 1 1 1 2 1 1 2   In general, how would you rate your mental health, including your mood and your ability to think? 1 1 1 1 1 1 1   In general, how would you rate your satisfaction with your social activities and relationships? 2 1 2 2 1 2 2   In general, please rate how well you carry out your usual social activities and roles. (This includes activities at home, at work and in  your community, and responsibilities as a parent, child, spouse, employee, friend, etc.) 1 1 2 2 1 1 1   To what extent are you able to carry out your everyday physical activities such as walking, climbing stairs, carrying groceries, or moving a chair? 3 2 2 2 2 2 2   In the past 7 days, how often have you been bothered by emotional problems such as feeling anxious, depressed, or irritable? 5 5 5 5 5 5 5   In the past 7 days, how would you rate your fatigue on average? 4 5 5 4 4 5 5   In the past 7 days, how would you rate your pain on average, where 0 means no pain, and 10 means worst imaginable pain? 7 7 7 7 8 7 8   Global Mental Health Score 5 4    4 6 6 4  5  5    Global Physical Health Score 8 6    6 6 8 7  6  7    PROMIS TOTAL - SUBSCORES 13 10    10 12 14 11  11  12        Patient-reported     [place] Suicide Severity Rating Scale (Lifetime/Recent)      8/31/2024    12:07 PM 9/5/2024     5:33 PM 9/13/2024    10:52 AM 10/4/2024     4:54 PM 11/22/2024    11:34 AM 12/25/2024    11:30 PM 1/28/2025     1:23 PM   [place] Suicide Severity Rating (Lifetime/Recent)   Q1 Wished to be Dead (Past Month) 0-->no 0-->no 0-->no 0-->no 0-->no 0-->no 0-->no   Q2 Suicidal Thoughts (Past Month) 0-->no 0-->no 0-->no 0-->no 0-->no 0-->no 0-->no   Q6 Suicide Behavior (Lifetime) 0-->no 1-->yes 0-->no 0-->no 0-->no 0-->no 0-->no   If yes to Q6, within past 3 months?  0-->no        Level of Risk per Screen no risks indicated moderate risk no risks indicated no risks indicated no risks indicated no risks indicated no risks indicated         ASSESSMENT: Current Emotional / Mental Status (status of significant symptoms):   Risk status (Self / Other harm or suicidal ideation)   Patient denies current fears or concerns for personal safety.   Patient denies current or recent suicidal ideation or behaviors.   Patient denies current or recent homicidal ideation or behaviors.   Patient denies current or recent self injurious behavior or  ideation.   Patient denies other safety concerns.   Patient reports there has been no change in risk factors since their last session.     Patient reports there has been no change in protective factors since their last session.     Recommended that patient call 911 or go to the local ED should there be a change in any of these risk factors.     Appearance:   Appropriate    Eye Contact:   Good    Psychomotor Behavior: Normal    Attitude:   Cooperative    Orientation:   All   Speech    Rate / Production: Normal/ Responsive Normal     Volume:  Normal    Mood:    Normal   Affect:    Appropriate    Thought Content:  Clear    Thought Form:  Coherent    Insight:    Good      Medication Review:   No changes to current psychiatric medication(s)     Medication Compliance:   Yes     Changes in Health Issues:   None reported     Chemical Use Review:   Substance Use: Chemical use reviewed, no active concerns identified      Tobacco Use: No change in amount of tobacco use since last session.  Patient declined discussion at this time    Diagnosis:  1. Moderate episode of recurrent major depressive disorder (H)    2. Generalized anxiety disorder            Collateral Reports Completed:   Not Applicable    PLAN: (Patient Tasks / Therapist Tasks / Other)  Continue to use stress reduction skills daily.               Ricarda Bhatia, [facility]                                                         ______________________________________________________________________    Individual Treatment Plan    Patient's Name: Radha Beckwith  YOB: 1975    Date of Creation: 6/28/23  Date Treatment Plan Last Reviewed/Revised: 12/2/24    DSM5 Diagnoses: 296.32 (F33.1) Major Depressive Disorder, Recurrent Episode, Moderate _  Psychosocial / Contextual Factors: living with boyfriend, memory issues  PROMIS (reviewed every 90 days):     Referral / Collaboration:  Referral to another professional/service is not indicated at this  time..    Anticipated number of session for this episode of care: 9-12 sessions  Anticipation frequency of session:  as needed  Anticipated Duration of each session: 38-52 minutes  Treatment plan will be reviewed in 90 days or when goals have been changed.       MeasurableTreatment Goal(s) related to diagnosis / functional impairment(s)  Goal 1: Patient will increase communication skills with family members.    Objective #A (Patient Action)    Patient will learn & utilize at least 2 assertive communication skills weekly.  Status: Continued - Date(s): 12/2/24    Intervention(s)  Therapist will teach emotional regulation skills. distress tolerance skills, interpersonal effectiveness skills, emotion regluation skills, mindfulness skills, radical acceptance. Therapist will teach client how to ID body cues for anxiety, anxiety reduction techniques, how to ID triggers for depression and anxiety- decrease reactivity/eliminate, lifestyle changes to reduce depression and anxiety, communication skills, explore cognitive beliefs and help client to develop healthy cognitive patterns and beliefs.    Objective #B  Patient will use thought-stopping strategy daily to reduce intrusive thoughts.  Status: Continued - Date(s): 12/2/24    Intervention(s)  Therapist will teach emotional regulation skills. distress tolerance skills, interpersonal effectiveness skills, emotion regluation skills, mindfulness skills, radical acceptance. Therapist will teach client how to ID body cues for anxiety, anxiety reduction techniques, how to ID triggers for depression and anxiety- decrease reactivity/eliminate, lifestyle changes to reduce depression and anxiety, communication skills, explore cognitive beliefs and help client to develop healthy cognitive patterns and beliefs.      Goal 2: Patient will decrease depression symptoms.      Objective #A (Patient Action)    Status: Continued - Date(s): 12/2/24    Patient will Increase interest, engagement,  and pleasure in doing things  Decrease frequency and intensity of feeling down, depressed, hopeless  Improve quantity and quality of night time sleep / decrease daytime naps  Feel less tired and more energy during the day   Improve diet, appetite, mindful eating, and / or meal planning  Identify negative self-talk and behaviors: challenge core beliefs, myths, and actions  Improve concentration, focus, and mindfulness in daily activities   Feel less fidgety, restless or slow in daily activities / interpersonal interactions.    Intervention(s)  Therapist will teach emotional regulation skills. distress tolerance skills, interpersonal effectiveness skills, emotion regluation skills, mindfulness skills, radical acceptance. Therapist will teach client how to ID body cues for anxiety, anxiety reduction techniques, how to ID triggers for depression and anxiety- decrease reactivity/eliminate, lifestyle changes to reduce depression and anxiety, communication skills, explore cognitive beliefs and help client to develop healthy cognitive patterns and beliefs.    Objective #B  Patient will identify three distraction and diversion activities and use those activities to decrease level of anxiety  .    Status: Continued - Date(s):  12/2/24    Intervention(s)  Therapist will teach emotional regulation skills. distress tolerance skills, interpersonal effectiveness skills, emotion regluation skills, mindfulness skills, radical acceptance. Therapist will teach client how to ID body cues for anxiety, anxiety reduction techniques, how to ID triggers for depression and anxiety- decrease reactivity/eliminate, lifestyle changes to reduce depression and anxiety, communication skills, explore cognitive beliefs and help client to develop healthy cognitive patterns and beliefs.      Patient has reviewed and agreed to the above plan.      Ricarda Bhatia [facility]     by MD Kumar/Merlene

## 2025-02-18 LAB
ANA SER QL IF: NEGATIVE
CCP AB SER IA-ACNC: 1.3 U/ML

## 2025-02-20 DIAGNOSIS — F41.9 ANXIETY: ICD-10-CM

## 2025-02-20 DIAGNOSIS — R11.0 NAUSEA: ICD-10-CM

## 2025-02-20 RX ORDER — ONDANSETRON 4 MG/1
TABLET, ORALLY DISINTEGRATING ORAL
Qty: 20 TABLET | Refills: 3 | Status: SHIPPED | OUTPATIENT
Start: 2025-02-20

## 2025-02-24 ENCOUNTER — OFFICE VISIT (OUTPATIENT)
Dept: OTOLARYNGOLOGY | Facility: CLINIC | Age: 50
End: 2025-02-24
Payer: COMMERCIAL

## 2025-02-24 ENCOUNTER — OFFICE VISIT (OUTPATIENT)
Dept: AUDIOLOGY | Facility: CLINIC | Age: 50
End: 2025-02-24
Payer: COMMERCIAL

## 2025-02-24 VITALS
BODY MASS INDEX: 27.14 KG/M2 | SYSTOLIC BLOOD PRESSURE: 122 MMHG | DIASTOLIC BLOOD PRESSURE: 80 MMHG | HEIGHT: 64 IN | WEIGHT: 159 LBS | HEART RATE: 99 BPM | TEMPERATURE: 97 F

## 2025-02-24 DIAGNOSIS — H92.01 OTALGIA, RIGHT: ICD-10-CM

## 2025-02-24 DIAGNOSIS — H90.3 BILATERAL SENSORINEURAL HEARING LOSS: Primary | ICD-10-CM

## 2025-02-24 DIAGNOSIS — T40.2X5A CONSTIPATION DUE TO OPIOID THERAPY: ICD-10-CM

## 2025-02-24 DIAGNOSIS — H90.3 SENSORINEURAL HEARING LOSS, BILATERAL: ICD-10-CM

## 2025-02-24 DIAGNOSIS — M26.609 TMJ (TEMPOROMANDIBULAR JOINT SYNDROME): Primary | ICD-10-CM

## 2025-02-24 DIAGNOSIS — K21.00 GASTROESOPHAGEAL REFLUX DISEASE WITH ESOPHAGITIS WITHOUT HEMORRHAGE: ICD-10-CM

## 2025-02-24 DIAGNOSIS — M54.2 CERVICALGIA: ICD-10-CM

## 2025-02-24 DIAGNOSIS — K59.03 CONSTIPATION DUE TO OPIOID THERAPY: ICD-10-CM

## 2025-02-24 DIAGNOSIS — H93.A1 PULSATILE TINNITUS, RIGHT EAR: ICD-10-CM

## 2025-02-24 PROCEDURE — 99214 OFFICE O/P EST MOD 30 MIN: CPT | Performed by: OTOLARYNGOLOGY

## 2025-02-24 PROCEDURE — 92550 TYMPANOMETRY & REFLEX THRESH: CPT | Performed by: AUDIOLOGIST

## 2025-02-24 PROCEDURE — 92557 COMPREHENSIVE HEARING TEST: CPT | Performed by: AUDIOLOGIST

## 2025-02-24 ASSESSMENT — PAIN SCALES - GENERAL: PAINLEVEL_OUTOF10: NO PAIN (0)

## 2025-02-24 ASSESSMENT — ANXIETY QUESTIONNAIRES
GAD7 TOTAL SCORE: 20
IF YOU CHECKED OFF ANY PROBLEMS ON THIS QUESTIONNAIRE, HOW DIFFICULT HAVE THESE PROBLEMS MADE IT FOR YOU TO DO YOUR WORK, TAKE CARE OF THINGS AT HOME, OR GET ALONG WITH OTHER PEOPLE: EXTREMELY DIFFICULT
8. IF YOU CHECKED OFF ANY PROBLEMS, HOW DIFFICULT HAVE THESE MADE IT FOR YOU TO DO YOUR WORK, TAKE CARE OF THINGS AT HOME, OR GET ALONG WITH OTHER PEOPLE?: EXTREMELY DIFFICULT
IF YOU CHECKED OFF ANY PROBLEMS ON THIS QUESTIONNAIRE, HOW DIFFICULT HAVE THESE PROBLEMS MADE IT FOR YOU TO DO YOUR WORK, TAKE CARE OF THINGS AT HOME, OR GET ALONG WITH OTHER PEOPLE: EXTREMELY DIFFICULT
7. FEELING AFRAID AS IF SOMETHING AWFUL MIGHT HAPPEN: NEARLY EVERY DAY
6. BECOMING EASILY ANNOYED OR IRRITABLE: NEARLY EVERY DAY
3. WORRYING TOO MUCH ABOUT DIFFERENT THINGS: NEARLY EVERY DAY
GAD7 TOTAL SCORE: 20
7. FEELING AFRAID AS IF SOMETHING AWFUL MIGHT HAPPEN: NEARLY EVERY DAY
5. BEING SO RESTLESS THAT IT IS HARD TO SIT STILL: MORE THAN HALF THE DAYS
2. NOT BEING ABLE TO STOP OR CONTROL WORRYING: NEARLY EVERY DAY
GAD7 TOTAL SCORE: 20
1. FEELING NERVOUS, ANXIOUS, OR ON EDGE: NEARLY EVERY DAY
4. TROUBLE RELAXING: NEARLY EVERY DAY
7. FEELING AFRAID AS IF SOMETHING AWFUL MIGHT HAPPEN: NEARLY EVERY DAY
4. TROUBLE RELAXING: NEARLY EVERY DAY
5. BEING SO RESTLESS THAT IT IS HARD TO SIT STILL: MORE THAN HALF THE DAYS
6. BECOMING EASILY ANNOYED OR IRRITABLE: NEARLY EVERY DAY
1. FEELING NERVOUS, ANXIOUS, OR ON EDGE: NEARLY EVERY DAY
2. NOT BEING ABLE TO STOP OR CONTROL WORRYING: NEARLY EVERY DAY
8. IF YOU CHECKED OFF ANY PROBLEMS, HOW DIFFICULT HAVE THESE MADE IT FOR YOU TO DO YOUR WORK, TAKE CARE OF THINGS AT HOME, OR GET ALONG WITH OTHER PEOPLE?: EXTREMELY DIFFICULT
3. WORRYING TOO MUCH ABOUT DIFFERENT THINGS: NEARLY EVERY DAY

## 2025-02-24 NOTE — PROGRESS NOTES
AUDIOLOGY REPORT     SUMMARY: Audiology visit completed. See audiogram for results.     RECOMMENDATIONS: Follow-up with ENT    Jordan Rodriges Licensed Audiologist #[id number]
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
## 2025-02-24 NOTE — PATIENT INSTRUCTIONS
[employer] is for any [facility] looking for free support to quit smoking, vaping or chewing.   [employer] will offer many quit support options and resources so [place] residents can continue to find the way to quit that works best for them.   Free support includes personalized coaching, email and text support, educational materials, and quit medication (nicotine patches, gum or lozenges) delivered by mail.     Contact [employer] at 1-800-QUIT-NOW or online at [employer] to receive support on your quit journey.

## 2025-02-25 ENCOUNTER — VIRTUAL VISIT (OUTPATIENT)
Dept: PSYCHOLOGY | Facility: OTHER | Age: 50
End: 2025-02-25
Payer: COMMERCIAL

## 2025-02-25 DIAGNOSIS — F33.1 MODERATE EPISODE OF RECURRENT MAJOR DEPRESSIVE DISORDER (H): Primary | ICD-10-CM

## 2025-02-25 DIAGNOSIS — F41.1 GENERALIZED ANXIETY DISORDER: ICD-10-CM

## 2025-02-25 PROCEDURE — 90834 PSYTX W PT 45 MINUTES: CPT | Mod: 95 | Performed by: COUNSELOR

## 2025-02-25 RX ORDER — PANTOPRAZOLE SODIUM 40 MG/1
40 TABLET, DELAYED RELEASE ORAL 2 TIMES DAILY WITH MEALS
Qty: 180 TABLET | Refills: 2 | Status: SHIPPED | OUTPATIENT
Start: 2025-02-25

## 2025-02-25 NOTE — PROGRESS NOTES
Answers submitted by the patient for this visit:  Patient Health Questionnaire (Submitted on 2/24/2025)  If you checked off any problems, how difficult have these problems made it for you to do your work, take care of things at home, or get along with other people?: Extremely difficult  PHQ9 TOTAL SCORE: 24  Patient Health Questionnaire (G7) (Submitted on 2/24/2025)  BO 7 TOTAL SCORE: 20          [facility]                                     Progress Note    Patient Name: Radha Beckwith  Date: 2/25/25         Service Type: Individual      Session Start Time: 3:15pm Session End Time: 4:00pm     Session Length:  45    Session #: 70    Attendees: Client    Service Modality:  video      Provider verified identity through the following two step process.  Patient provided:  Patient is known previously to provider    Telemedicine Visit: The patient's condition can be safely assessed and treated via synchronous audio and visual telemedicine encounter.      Reason for Telemedicine Visit: Patient convenience (e.g. access to timely appointments / distance to available provider)    Originating Site (Patient Location): Patient's home    Distant Site (Provider Location): Provider Remote Setting- Home Office    Consent:  The patient/guardian has verbally consented to: the potential risks and benefits of telemedicine (video visit) versus in person care; bill my insurance or make self-payment for services provided; and responsibility for payment of non-covered services.     Patient would like the video invitation sent by:  My Chart    Mode of Communication:  telephone- client did not have access to wi/fi to be able to do video session    Distant Location (Provider):  Off-site home office    As the provider I attest to compliance with applicable laws and regulations related to telemedicine.    There has been demonstrated improvement in functioning while patient has been engaged in psychotherapy/psychological  service- if withdrawn the patient would deteriorate and/or relapse.      DATA  Interactive Complexity: No  Crisis: No        Progress Since Last Session (Related to Symptoms / Goals / Homework):   Symptoms: No change .    Homework: Completed in session      Episode of Care Goals: Satisfactory progress - MAINTENANCE (Working to maintain change, with risk of relapse); Intervened by continuing to positively reinforce healthy behavior choice      Current / Ongoing Stressors and Concerns:   Forgot about today's appointment because of so much going on today.   Stressors with health issues. Just found out she has TMJ.       Treatment Objective(s) Addressed in This Session:   Increase interest, engagement, and pleasure in doing things  Feel less tired and more energy during the day        Intervention:   Talked about the events of the day that made her late to the appointment. Processed the stress and reviewed relaxations skills. Talked about the emerging health issues and encouraged client to continue to follow up with her doctors.     Assessments completed prior to visit:  The following assessments were completed by patient for this visit:  PHQ9:       12/1/2024     4:41 PM 12/16/2024     1:03 PM 1/6/2025     3:29 PM 1/12/2025     1:19 PM 2/7/2025    12:36 PM 2/17/2025    12:20 PM 2/24/2025    12:02 PM   PHQ-9 SCORE   PHQ-9 Total Score MyChart 23 (Severe depression) 23 (Severe depression) 24 (Severe depression) 23 (Severe depression)  23 (Severe depression) 24 (Severe depression)   PHQ-9 Total Score 23  23  24  23  0 23  24        Patient-reported     GAD7:       10/27/2024     7:59 PM 11/10/2024     7:03 PM 12/1/2024     4:42 PM 12/16/2024     1:05 PM 1/6/2025     3:30 PM 2/5/2025     7:35 PM 2/24/2025    12:03 PM   BO-7 SCORE   Total Score 20 (severe anxiety) 19 (severe anxiety) 20 (severe anxiety) 18 (severe anxiety) 21 (severe anxiety) 20 (severe anxiety) 20 (severe anxiety)   Total Score 20  19  20  18  21  20  20         Patient-reported     PROMIS 10-Global Health (all questions and answers displayed):       8/10/2024     2:51 PM 8/18/2024    11:36 AM 8/28/2024     2:07 PM 12/1/2024     4:43 PM 12/16/2024     1:07 PM 1/6/2025     3:31 PM 2/24/2025    12:00 PM   PROMIS 10   In general, would you say your health is: Fair Poor Poor Poor Poor Poor Poor   In general, would you say your quality of life is: Poor Fair Fair Poor Poor Poor Poor   In general, how would you rate your physical health? Poor Poor Fair Poor Poor Fair Poor   In general, how would you rate your mental health, including your mood and your ability to think? Poor Poor Poor Poor Poor Poor Poor   In general, how would you rate your satisfaction with your social activities and relationships? Poor Fair Fair Poor Fair Fair Poor   In general, please rate how well you carry out your usual social activities and roles Poor Fair Fair Poor Poor Poor Poor   To what extent are you able to carry out your everyday physical activities such as walking, climbing stairs, carrying groceries, or moving a chair? A little A little A little A little A little A little A little   In the past 7 days, how often have you been bothered by emotional problems such as feeling anxious, depressed, or irritable? Always Always Always Always Always Always Always   In the past 7 days, how would you rate your fatigue on average? Very severe Very severe Severe Severe Very severe Very severe Severe   In the past 7 days, how would you rate your pain on average, where 0 means no pain, and 10 means worst imaginable pain? 7 7 7 8 7 8 7   In general, would you say your health is: 2 1 1 1 1 1 1   In general, would you say your quality of life is: 1 2 2 1 1 1 1   In general, how would you rate your physical health? 1 1 2 1 1 2 1   In general, how would you rate your mental health, including your mood and your ability to think? 1 1 1 1 1 1 1   In general, how would you rate your satisfaction with your social  activities and relationships? 1 2 2 1 2 2 1   In general, please rate how well you carry out your usual social activities and roles. (This includes activities at home, at work and in your community, and responsibilities as a parent, child, spouse, employee, friend, etc.) 1 2 2 1 1 1 1   To what extent are you able to carry out your everyday physical activities such as walking, climbing stairs, carrying groceries, or moving a chair? 2 2 2 2 2 2 2   In the past 7 days, how often have you been bothered by emotional problems such as feeling anxious, depressed, or irritable? 5 5 5 5 5 5 5   In the past 7 days, how would you rate your fatigue on average? 5 5 4 4 5 5 4   In the past 7 days, how would you rate your pain on average, where 0 means no pain, and 10 means worst imaginable pain? 7 7 7 8 7 8 7   Global Mental Health Score 4    4 6 6 4  5  5  4    Global Physical Health Score 6    6 6 8 7  6  7  7    PROMIS TOTAL - SUBSCORES 10    10 12 14 11  11  12  11        Patient-reported     [place] Suicide Severity Rating Scale (Lifetime/Recent)      8/31/2024    12:07 PM 9/5/2024     5:33 PM 9/13/2024    10:52 AM 10/4/2024     4:54 PM 11/22/2024    11:34 AM 12/25/2024    11:30 PM 1/28/2025     1:23 PM   [place] Suicide Severity Rating (Lifetime/Recent)   Q1 Wished to be Dead (Past Month) 0-->no 0-->no 0-->no 0-->no 0-->no 0-->no 0-->no   Q2 Suicidal Thoughts (Past Month) 0-->no 0-->no 0-->no 0-->no 0-->no 0-->no 0-->no   Q6 Suicide Behavior (Lifetime) 0-->no 1-->yes 0-->no 0-->no 0-->no 0-->no 0-->no   If yes to Q6, within past 3 months?  0-->no        Level of Risk per Screen no risks indicated moderate risk no risks indicated no risks indicated no risks indicated no risks indicated no risks indicated         ASSESSMENT: Current Emotional / Mental Status (status of significant symptoms):   Risk status (Self / Other harm or suicidal ideation)   Patient denies current fears or concerns for personal safety.   Patient  denies current or recent suicidal ideation or behaviors.   Patient denies current or recent homicidal ideation or behaviors.   Patient denies current or recent self injurious behavior or ideation.   Patient denies other safety concerns.   Patient reports there has been no change in risk factors since their last session.     Patient reports there has been no change in protective factors since their last session.     Recommended that patient call 911 or go to the local ED should there be a change in any of these risk factors.     Appearance:   Appropriate    Eye Contact:   Good    Psychomotor Behavior: Normal    Attitude:   Cooperative    Orientation:   All   Speech    Rate / Production: Normal/ Responsive Normal     Volume:  Normal    Mood:    Normal   Affect:    Appropriate    Thought Content:  Clear    Thought Form:  Coherent    Insight:    Good      Medication Review:   No changes to current psychiatric medication(s)     Medication Compliance:   Yes     Changes in Health Issues:   None reported     Chemical Use Review:   Substance Use: Chemical use reviewed, no active concerns identified      Tobacco Use: No change in amount of tobacco use since last session.  Patient declined discussion at this time    Diagnosis:  1. Moderate episode of recurrent major depressive disorder (H)    2. Generalized anxiety disorder            Collateral Reports Completed:   Not Applicable    PLAN: (Patient Tasks / Therapist Tasks / Other)  Continue to use stress reduction skills daily.               Ricarda Bhatia [facility]                                                         ______________________________________________________________________    Individual Treatment Plan    Patient's Name: Radha Beckwith  YOB: 1975    Date of Creation: 6/28/23  Date Treatment Plan Last Reviewed/Revised: 2/25/25    DSM5 Diagnoses: 296.32 (F33.1) Major Depressive Disorder, Recurrent Episode, Moderate _  Psychosocial / Contextual  Factors: living with boyfriend, memory issues  PROMIS (reviewed every 90 days):     Referral / Collaboration:  Referral to another professional/service is not indicated at this time..    Anticipated number of session for this episode of care: 9-12 sessions  Anticipation frequency of session:  as needed  Anticipated Duration of each session: 38-52 minutes  Treatment plan will be reviewed in 90 days or when goals have been changed.       MeasurableTreatment Goal(s) related to diagnosis / functional impairment(s)  Goal 1: Patient will increase communication skills with family members.    Objective #A (Patient Action)    Patient will learn & utilize at least 2 assertive communication skills weekly.  Status: Continued - Date(s): 2/25/25    Intervention(s)  Therapist will teach emotional regulation skills. distress tolerance skills, interpersonal effectiveness skills, emotion regluation skills, mindfulness skills, radical acceptance. Therapist will teach client how to ID body cues for anxiety, anxiety reduction techniques, how to ID triggers for depression and anxiety- decrease reactivity/eliminate, lifestyle changes to reduce depression and anxiety, communication skills, explore cognitive beliefs and help client to develop healthy cognitive patterns and beliefs.    Objective #B  Patient will use thought-stopping strategy daily to reduce intrusive thoughts.  Status: Continued - Date(s): 2/25/25    Intervention(s)  Therapist will teach emotional regulation skills. distress tolerance skills, interpersonal effectiveness skills, emotion regluation skills, mindfulness skills, radical acceptance. Therapist will teach client how to ID body cues for anxiety, anxiety reduction techniques, how to ID triggers for depression and anxiety- decrease reactivity/eliminate, lifestyle changes to reduce depression and anxiety, communication skills, explore cognitive beliefs and help client to develop healthy cognitive patterns and  beliefs.      Goal 2: Patient will decrease depression symptoms.      Objective #A (Patient Action)    Status: Continued - Date(s): 2/25/25    Patient will Increase interest, engagement, and pleasure in doing things  Decrease frequency and intensity of feeling down, depressed, hopeless  Improve quantity and quality of night time sleep / decrease daytime naps  Feel less tired and more energy during the day   Improve diet, appetite, mindful eating, and / or meal planning  Identify negative self-talk and behaviors: challenge core beliefs, myths, and actions  Improve concentration, focus, and mindfulness in daily activities   Feel less fidgety, restless or slow in daily activities / interpersonal interactions.    Intervention(s)  Therapist will teach emotional regulation skills. distress tolerance skills, interpersonal effectiveness skills, emotion regluation skills, mindfulness skills, radical acceptance. Therapist will teach client how to ID body cues for anxiety, anxiety reduction techniques, how to ID triggers for depression and anxiety- decrease reactivity/eliminate, lifestyle changes to reduce depression and anxiety, communication skills, explore cognitive beliefs and help client to develop healthy cognitive patterns and beliefs.    Objective #B  Patient will identify three distraction and diversion activities and use those activities to decrease level of anxiety  .    Status: Continued - Date(s):  2/25/25    Intervention(s)  Therapist will teach emotional regulation skills. distress tolerance skills, interpersonal effectiveness skills, emotion regluation skills, mindfulness skills, radical acceptance. Therapist will teach client how to ID body cues for anxiety, anxiety reduction techniques, how to ID triggers for depression and anxiety- decrease reactivity/eliminate, lifestyle changes to reduce depression and anxiety, communication skills, explore cognitive beliefs and help client to develop healthy cognitive  patterns and beliefs.      Patient has reviewed and agreed to the above plan.      Ricarda Bhatia, [facility]

## 2025-02-26 ENCOUNTER — VIRTUAL VISIT (OUTPATIENT)
Dept: PSYCHOLOGY | Facility: CLINIC | Age: 50
End: 2025-02-26
Payer: COMMERCIAL

## 2025-02-26 DIAGNOSIS — F33.1 MODERATE EPISODE OF RECURRENT MAJOR DEPRESSIVE DISORDER (H): ICD-10-CM

## 2025-02-26 DIAGNOSIS — F41.1 GENERALIZED ANXIETY DISORDER: ICD-10-CM

## 2025-02-26 PROCEDURE — 90837 PSYTX W PT 60 MINUTES: CPT | Mod: 95 | Performed by: PSYCHOLOGIST

## 2025-02-26 NOTE — PROGRESS NOTES
[facility]   Mental Health & Addiction Services     Progress Note - Initial Visit    Patient  Name:  Radha Beckwith Date: 2025         Service Type: Individual     Visit Start Time: 9:03am  Visit End Time: 9:57am    Visit #: 1    Attendees: Client attended alone    Service Modality:  Video Visit:      Provider verified identity through the following two step process.  Patient provided:  Patient photo and Patient     Telemedicine Visit: The patient's condition can be safely assessed and treated via synchronous audio and visual telemedicine encounter.      Reason for Telemedicine Visit: Patient has requested telehealth visit    Originating Site (Patient Location): Patient's home    Distant Site (Provider Location): [facility]    Consent:  The patient/guardian has verbally consented to: the potential risks and benefits of telemedicine (video visit) versus in person care; bill my insurance or make self-payment for services provided; and responsibility for payment of non-covered services.     Patient would like the video invitation sent by:  My Chart    Mode of Communication:  Video Conference via [employer]well    Distant Location (Provider):  Off-site    As the provider I attest to compliance with applicable laws and regulations related to telemedicine.       DATA:   Interactive Complexity: No   Crisis: No  Extended Session (53+ minutes):     - Patient's presenting concerns require more intensive intervention than could be completed within the usual service     Presenting Concerns/  Current Stressors:   Began DA for ADHD assessment.      ASSESSMENT:  Mental Status Assessment:  Appearance:   Appropriate   Eye Contact:   Good   Psychomotor Behavior: Normal   Attitude:   Cooperative  Interested Friendly Pleasant  Orientation:   All  Speech   Rate / Production: Normal/ Responsive Talkative   Volume:  Normal   Mood:    Anxious  Normal  Affect:    Appropriate   Thought  Content:  Clear   Thought Form:  Coherent  Logical  Tangential   Insight:    Good     Assessments completed prior to this visit:  The following assessments were completed by patient for this visit:  PHQ9:       12/16/2024     1:03 PM 1/6/2025     3:29 PM 1/12/2025     1:19 PM 2/7/2025    12:36 PM 2/17/2025    12:20 PM 2/24/2025    12:02 PM 2/25/2025     6:49 PM   PHQ-9 SCORE   PHQ-9 Total Score MyChart 23 (Severe depression) 24 (Severe depression) 23 (Severe depression)  23 (Severe depression) 24 (Severe depression) 24 (Severe depression)   PHQ-9 Total Score 23  24  23  0 23  24  24        Patient-reported     GAD7:       10/27/2024     7:59 PM 11/10/2024     7:03 PM 12/1/2024     4:42 PM 12/16/2024     1:05 PM 1/6/2025     3:30 PM 2/5/2025     7:35 PM 2/24/2025    12:03 PM   BO-7 SCORE   Total Score 20 (severe anxiety) 19 (severe anxiety) 20 (severe anxiety) 18 (severe anxiety) 21 (severe anxiety) 20 (severe anxiety) 20 (severe anxiety)   Total Score 20  19  20  18  21  20  20        Patient-reported     CAGE-AID:       4/17/2023     2:14 PM   CAGE-AID Total Score   Total Score 0    0   Total Score MyChart 0 (A total score of 2 or greater is considered clinically significant)     PROMIS 10-Global Health (all questions and answers displayed):       8/10/2024     2:51 PM 8/18/2024    11:36 AM 8/28/2024     2:07 PM 12/1/2024     4:43 PM 12/16/2024     1:07 PM 1/6/2025     3:31 PM 2/24/2025    12:00 PM   PROMIS 10   In general, would you say your health is: Fair Poor Poor Poor Poor Poor Poor   In general, would you say your quality of life is: Poor Fair Fair Poor Poor Poor Poor   In general, how would you rate your physical health? Poor Poor Fair Poor Poor Fair Poor   In general, how would you rate your mental health, including your mood and your ability to think? Poor Poor Poor Poor Poor Poor Poor   In general, how would you rate your satisfaction with your social activities and relationships? Poor Fair Fair Poor  Fair Fair Poor   In general, please rate how well you carry out your usual social activities and roles Poor Fair Fair Poor Poor Poor Poor   To what extent are you able to carry out your everyday physical activities such as walking, climbing stairs, carrying groceries, or moving a chair? A little A little A little A little A little A little A little   In the past 7 days, how often have you been bothered by emotional problems such as feeling anxious, depressed, or irritable? Always Always Always Always Always Always Always   In the past 7 days, how would you rate your fatigue on average? Very severe Very severe Severe Severe Very severe Very severe Severe   In the past 7 days, how would you rate your pain on average, where 0 means no pain, and 10 means worst imaginable pain? 7 7 7 8 7 8 7   In general, would you say your health is: 2 1 1 1 1 1 1   In general, would you say your quality of life is: 1 2 2 1 1 1 1   In general, how would you rate your physical health? 1 1 2 1 1 2 1   In general, how would you rate your mental health, including your mood and your ability to think? 1 1 1 1 1 1 1   In general, how would you rate your satisfaction with your social activities and relationships? 1 2 2 1 2 2 1   In general, please rate how well you carry out your usual social activities and roles. (This includes activities at home, at work and in your community, and responsibilities as a parent, child, spouse, employee, friend, etc.) 1 2 2 1 1 1 1   To what extent are you able to carry out your everyday physical activities such as walking, climbing stairs, carrying groceries, or moving a chair? 2 2 2 2 2 2 2   In the past 7 days, how often have you been bothered by emotional problems such as feeling anxious, depressed, or irritable? 5 5 5 5 5 5 5   In the past 7 days, how would you rate your fatigue on average? 5 5 4 4 5 5 4   In the past 7 days, how would you rate your pain on average, where 0 means no pain, and 10 means worst  imaginable pain? 7 7 7 8 7 8 7   Global Mental Health Score 4    4 6 6 4  5  5  4    Global Physical Health Score 6    6 6 8 7  6  7  7    PROMIS TOTAL - SUBSCORES 10    10 12 14 11  11  12  11        Patient-reported         Safety Issues and Plan for Safety and Risk Management:   [place] Suicide Severity Rating Scale (Lifetime/Recent)      8/31/2024    12:07 PM 9/5/2024     5:33 PM 9/13/2024    10:52 AM 10/4/2024     4:54 PM 11/22/2024    11:34 AM 12/25/2024    11:30 PM 1/28/2025     1:23 PM   [place] Suicide Severity Rating (Lifetime/Recent)   Q1 Wished to be Dead (Past Month) 0-->no 0-->no 0-->no 0-->no 0-->no 0-->no 0-->no   Q2 Suicidal Thoughts (Past Month) 0-->no 0-->no 0-->no 0-->no 0-->no 0-->no 0-->no   Q6 Suicide Behavior (Lifetime) 0-->no 1-->yes 0-->no 0-->no 0-->no 0-->no 0-->no   If yes to Q6, within past 3 months?  0-->no        Level of Risk per Screen no risks indicated moderate risk no risks indicated no risks indicated no risks indicated no risks indicated no risks indicated     Patient denies current fears or concerns for personal safety.  Patient denies current or recent suicidal ideation or behaviors.  Patient denies current or recent homicidal ideation or behaviors.  Patient denies current or recent self injurious behavior or ideation.  Patient denies other safety concerns.  Recommended that patient call 911 or go to the local ED should there be a change in any of these risk factors     DSM5 Diagnoses: (Sustained by MR)  Diagnoses: 296.32 (F33.1) Major Depressive Disorder, Recurrent Episode, Moderate _  300.02 (F41.1) Generalized Anxiety Disorder  Psychosocial & Contextual Factors: None  Intervention:   CBT: positive reinforcement, behavior modification  Emotion Focused Therapy: emotion checking  Motivational Interviewing: open ended questions  Collateral Reports Completed:  Routed note to Care Team Member(s)      PLAN: (Homework, other):  1. Provider will continue Diagnostic Assessment.   Patient was given the following to do until next session:     ADHD Testing:  Patient was given self and collaborative rating scales to be completed prior to the next appointment.  Depression and anxiety rating scales were completed.  Copies of no records were requested. .      2. Provider recommended the following referrals: None.      3.  Suicide Risk and Safety Concerns were assessed for Radha SHILPA Amena.    Patient meets the following risk assessment and triage: Patient denied any current/recent/lifetime history of suicidal ideation and/or behaviors.  No safety plan indicated at this time.       Loulou Mendoza, PhD  February 26, 2025

## 2025-02-26 NOTE — PROGRESS NOTES
Radha Beckwith  :  1975  DOS: 2025  MRN: [id number]  PCP: Gisselle Linn    Sports Medicine Clinic Visit    Interim History - 2025  - Last seen on 2024 for bilateral De Quervain's tenosynovitis  .   - Bilateral De Quervain's tenosynovitis injections completed on 2024 provided at least some relief of her symptoms, difficult to tell based on other procedures and pains that were going on at the time.  - Since the last visit, she notes that she had had 2 MBB and a radiofrequency ablation of the cervical region that has increased her base of the neck tingling. Bilateral radial wrist pain has not decreased with these procedures. Patient had high hopes it would have. Pain with scooping ice cream and twisting a door handle now which continue to get worse.  Interested in injections today.      Interim History - 2024  - Last seen on 2024 for bilateral de Quervain's tenosynovitis, right worse than left.  Cervical radiculopathy does not seem to be a major component of her symptoms.  Trial of Medrol Dosepak, recommended keeping a pain log and instructed on thumb spica wrist brace proper use.   - She notes that medrol dosepak was not helpful. Continues to wear thumb spica braces. Also notes she has had bilateral C3-5 MBB and KINDRA injections also that were not helpful for wrist symptoms.  - Plan was to follow-up in 2 to 3 weeks, other medical conditions and mental health concerns delayed follow-up, which is reasonable. Consideration of deQuervain's tendon sheath injections today if needed.     - Since the last visit, she notes continued bilateral radial wrist pain. Interested in injections today.       Initial Visit: 2024  HPI  Radha Beckwith is a 48 year old female who is seen in consultation at the request of  Jenny Landrum C.N.P. presenting with bilateral hand pain.    - Mechanism of Injury:    - No inciting injury  - Pertinent history and prior  evaluations:    - Hx of cervical radiculopathy and chronic low back pain.  S/p C6-C7 IL JASE, most recent in 2023 and 4/19/2024.  Suspected of carpal tunnel syndrome and de Quervain's tenosynovitis bilaterally in the past.  - 8/8/2023 BUE EMG within normal limits without electrodiagnostic evidence of median mononeuropathies at the wrists.  Also needled the right side, which was normal.  - Seen in 2021 and 2022 for bilateral hand weakness. Did occupational therapy. Was working with Dr. Pat Patterson.  Then saw Dr. Gonsalves on 6/20/2023 and 9/28/2023, has been using multiple wrist splints and Voltaren gel.  No previous injections.  Suspected probable de Quervain's tenosynovitis bilaterally and cervical radiculopathy contributing to her pain, not classic for CTS.  Recommended consideration of cervical JASE and repeat EMG if needed.    - Pain Character:    - Location:  bilateral base of thumbs into radial wrist and bilateral long fingers  - Character:  burning, pins and needles, electric shock  - Duration:  chronic  - Course:  worsening  - Endorses:    - numbness, tingling occasionally in the superficial radial nerve distribution, hand weakness, right wrist clicking, right long finger triggering, swelling in fingers  - Denies:    - instability, radicular shooting pain  - Alleviating factors:    -  thumb spica wrist braces , topicals  - Aggravating factors:    - gripping, weather changes  - Other treatments tried:    - thumb spica wrist braces (helpful but limiting what she can do with braces on), Voltaren, unable to take IBU, heat, cold, Lidocaine patches, topical     - Patient Goals:    - Referred for hand/wrist injections  - Social History:   -  Currently not working: working on getting on disability      Review of Systems  Musculoskeletal: as above  Remainder of review of systems is negative including constitutional, CV, pulmonary, GI, Skin and Neurologic except as noted in HPI or medical history.    Past Medical  History:   Diagnosis Date    Abdominal pain, right lower quadrant 03/09/2008    Admit. Discharged 03/10/08    Anxiety attack 07/31/2015    Atypical chest pain 06/23/2015    De Quervain's disease (tenosynovitis)     Dehydration     Depressive disorder 1996    Gastric ulcer 07/31/2015    GERD (gastroesophageal reflux disease) 07/28/2010    Takes omeprazole and metoclopramide     Hypertension 2017    Ingrowing nail 01/09/2014    Migraines     Opioid dependence in remission (H) 08/02/2015    Other and unspecified ovarian cyst     Papanicolaou smear of cervix with low grade squamous intraepithelial lesion (LGSIL) 07/07/2017     Past Surgical History:   Procedure Laterality Date    BIOPSY CERVICAL, LOCAL EXCISION, SINGLE/MULTIPLE N/A 8/10/2017    Procedure: BIOPSY CERVICAL, LOCAL EXCISION, SINGLE/MULTIPLE;;  Surgeon: Michael Chandler MD;  [facility]    COLONOSCOPY N/A 1/6/2023    Procedure: COLONOSCOPY;  Surgeon: Michael Dozier MD;  [facility]    COLONOSCOPY N/A 1/28/2025    Procedure: COLONOSCOPY, WITH POLYPECTOMY AND BIOPSY;  Surgeon: John Paul Varela MD;  [facility]    COLPOSCOPY, BIOPSY, COMBINED N/A 8/10/2017    Procedure: COMBINED COLPOSCOPY, BIOPSY;  Colposcopy with Cervical Biopsies and Endometrial Biopsy, Exam with Ultrasound;  Surgeon: Michael Chandler MD;  [facility]    ESOPHAGOSCOPY, GASTROSCOPY, DUODENOSCOPY (EGD), COMBINED N/A 4/17/2017    Procedure: COMBINED ESOPHAGOSCOPY, GASTROSCOPY, DUODENOSCOPY (EGD);  Surgeon: Ibrahima Esposito MD;  [facility]    ESOPHAGOSCOPY, GASTROSCOPY, DUODENOSCOPY (EGD), COMBINED N/A 1/6/2023    Procedure: ESOPHAGOGASTRODUODENOSCOPY, WITH BIOPSY;  Surgeon: Michael Dozier MD;  [facility]    ESOPHAGOSCOPY, GASTROSCOPY, DUODENOSCOPY (EGD), COMBINED N/A 10/3/2023    Procedure: ESOPHAGOGASTRODUODENOSCOPY, WITH BIOPSY;  Surgeon: John Paul Varela MD;  [facility]    EXAM UNDER ANESTHESIA PELVIC N/A 8/10/2017    Procedure: EXAM  UNDER ANESTHESIA PELVIC;;  Surgeon: Michael Chandler MD;  [facility]    HERNIORRHAPHY, INCISIONAL, ROBOT-ASSISTED, LAPAROSCOPIC, USING DA JERRELL XI N/A 2024    Procedure: HERNIORRHAPHY, INCISIONAL, ROBOT-ASSISTED, LAPAROSCOPIC, USING DA JERRELL XI;  Surgeon: Michael Dozier MD;  [facility]    HYSTERECTOMY      HYSTERECTOMY, PAP NO LONGER INDICATED      INJECT EPIDURAL CERVICAL N/A 10/20/2023    Procedure: Cervical 6-7 Interlaminar epidural steroid injection using fluoroscopic guidance with contrast dye.;  Surgeon: Trevor Ivory MD;  [facility]    INJECT EPIDURAL CERVICAL N/A 2024    Procedure: Cervical 6 - Cervical 7 Interlaminar epidural steroid injection using fluoroscopic guidance with contrast dye.;  Surgeon: Trevor Ivory MD;  [facility]    INJECT EPIDURAL LUMBAR Bilateral 2022    Procedure: Lumbar 5-Sacral 1 Transforaminal Epidural Steroid Injection with fluoroscopic guidance and contrast, bilateral;  Surgeon: Trevor Ivory MD;  [facility]    LAPAROSCOPIC CHOLECYSTECTOMY N/A 2018    Procedure: LAPAROSCOPIC CHOLECYSTECTOMY;  Laparoscopic Cholecystectomy;  Surgeon: Tigre Lowry DO;  [facility]    LAPAROSCOPIC HYSTERECTOMY TOTAL N/A 10/30/2017    Procedure: LAPAROSCOPIC HYSTERECTOMY TOTAL;  LAPAROSCOPIC HYSTERECTOMY TOTAL POSSIBLE SALPINGO-OOPHERECTOMY (BILATERAL);  Surgeon: Michael Chandler MD;  [facility]    ZZHC UGI ENDOSCOPY, SIMPLE EXAM  08     Family History   Problem Relation Age of Onset    Depression Mother     Respiratory Mother     Chronic Obstructive Pulmonary Disease Mother     Hypertension Mother     Anxiety Disorder Mother     Cerebrovascular Disease Father         First stroke at age 28,  from 2nd when he was 55. Brain aneurysms.    Breast Cancer Cousin     Adrenal Disorder Other     Chronic Obstructive Pulmonary Disease Other     Asthma Brother          Objective  LMP  (LMP Unknown)     General:  healthy, alert and in no acute distress.    HEENT: no scleral icterus or conjunctival erythema.   Skin: no suspicious lesions or rash. No jaundice.   CV: regular rhythm by palpation, 2+ distal pulses.  Resp: normal respiratory effort without conversational dyspnea.   Psych: normal mood and affect.    Gait: nonantalgic, appropriate coordination and balance.     Neuro:        - Sensation to light touch:    - Intact throughout the BUE including all peripheral nerve distributions.        - Special tests:   - Tinel's at the wrist:  Neg    - Tinel's at the elbow:  Neg    - Stressed Phalen's:  Neg     MSK - Wrist/Hand:        - Inspection:    - No surrounding major swelling, erythema, warmth, ecchymosis, lesion, or atrophy noted.        - ROM:    - Full AROM/PROM with pain during thumb abduction, extension       - Palpation:    - TTP at the de Quervain's tendon sheath bilaterally, right worse than left.   - NTTP elsewhere.        - Strength:  (*antalgic)   - Forearm Pronation   5   - Forearm Supination   5   - Wrist Extension   5   - Wrist Flexion    5   - FDI     5   - ADM     5   - FPL     5   - APB     5   - EIP     5   - EDC     5   - APL/EPB    5-*            - Special tests:        - CMC grind:  Neg    - Finkelstein's:  ++Pos, R > L   - TFCC compression:  Neg    - Fulton's:  Neg       Radiology  I independently reviewed the available relevant imaging in the chart with my interpretations as above in HPI.   - XR B/L hands 6/6/2024 shows normal anatomic alignment without significant degenerative changes, no acute fractures or dislocations.    Procedure  Hand / Upper Extremity Injection/Arthrocentesis: bilateral extensor compartment 1    Date/Time: 2/27/2025 3:13 PM    Performed by: Jose Adame DO  Authorized by: Jose Adame DO    Indications:  Pain  Needle Size:  27 G  Guidance: ultrasound    Approach:  Radial  Condition: de Quervain's    Laterality:  Bilateral    Site:  Bilateral extensor compartment  1  Medications (Right):  20 mg triamcinolone 40 MG/ML; 0.5 mL lidocaine 1 %  Medications (Left):  20 mg triamcinolone 40 MG/ML; 0.5 mL lidocaine 1 %  Outcome:  Tolerated well, no immediate complications  Procedure discussed: discussed risks, benefits, and alternatives    Consent Given by:  Patient  Prep: patient was prepped and draped in usual sterile fashion     Ultrasound images of procedure were permanently stored.     Procedure:   De Quervain's Tendon Sheath Injection  Consent given, and signed on chart. The area was prepped in typical sterile fashion with chlorhexidine solution. First dorsal compartment thumb extensor tendons (APL/EPB) were identified under ultrasound on long and short axis.  A 1.5 inch 27 gauge needle was placed into the first dorsal compartment tendon sheath between the APL and EPB tendons, taking care not to inject the tendons themselves.  A mixture of 0.5ml 1% lidocaine and 0.5 ml kenalog (40mg/ml) was injected without difficulty. Ultrasound documentation of needle placement and injection was achieved.  After removal of the needle, the area was cleaned, hemostasis achieved, and bandage applied.  Patient tolerated the procedure well, no complications.      Assessment  1. De Quervain's tenosynovitis, bilateral            Plan  Radha Beckwith is a pleasant 48 year old female that presents with chronic bilateral hand pain, right worse than left.  She has multiple areas of pain, worst is in the radial wrist and thumb that hurts the most with repetitive hand motions, hand , thumb abduction/extension.  Very positive Finkelstein's maneuver today on exam.  She does have some pain elsewhere in the wrists and some numbness/tingling in the hand.  The numbness and tingling appears most consistent with superficial radial neuropathy, may have some CTS paresthesias as well.  Radiographs within normal limits without significant degenerative changes, fractures, or dislocations.  She has been trying  thumb spica bracing in the past which she wears only at night, and has not helped fully, no injections in the past.  No significant radicular shooting pain throughout the arm.  History and physical exam appear most consistent with de Quervain's tenosynovitis bilaterally, right worse than left.  She does not seem to have a major component of cervical radiculopathy affecting her symptoms.    We discussed the nature of the condition and available treatment options, and mutually agreed upon the following plan:    She would like to start by trying a Medrol Dosepak to see how much pain relief she gets from the burst pack, she will keep a pain log of what pain improves and what pain does.  Instructed on proper use of the thumb spica brace which will be to use it during exacerbating activities, do not necessarily need to wear it at night or at rest unless she finds it helpful.  She will try this new regimen for thumb spica bracing.  Also talked about more low-profile braces such as a gamekeeper brace, which she may take from clinic or purchase on her own.  Try to gamekeeper brace on in clinic and decided not to take 1 today.  She will use Voltaren gel and other topicals with Tylenol as needed for breakthrough pain.    We will follow-up in 2 to 3 weeks to evaluate how much improvement she got from the Medrol Dosepak and bracing modifications, may consider de Quervain's tendon sheath corticosteroid injection at that time if desired.  May contact clinic for questions/concerns as needed.    Updated Plan - 8/22/24:  Linette presents for follow-up of her bilateral wrist and thumb pain secondary to de Quervain's tenosynovitis.  She has undergone multiple surgical procedures including MBB/RFA and JASE which did not change the pain in the radial wrist/thumb.  She has been wearing thumb spica braces and continues to have persistent symptoms.  Interested in previously discussed de Quervain's tenosynovitis corticosteroid injections today.   Performed bilateral DQ injections in clinic without complication and immediate improvement in pain.  May follow-up in 3+ months as needed if symptoms return.  May continue to use thumb spica braces as needed.      Update - 2/27/25:  Linette presents today for follow-up of her de Quervain's tenosynovitis bilaterally.  She is unsure how much relief she got from the previous corticosteroid injections due to other procedures and pain going out of that time, but she did note that it did help for at least a short period of time.  She now continues to have pain of de Quervain's tendons that hurts worse with thumb extension and abduction and handgrip activities.  She is hopeful to get injections today and evaluate for response.  She is hopeful that they will give better relief now that some of the other pain that has gone away.  We did perform bilateral de Quervain's tendon sheath corticosteroid injections today in clinic without complication.  She may follow-up in 3+ months as needed if symptoms return.      Jose Adame DO, CAQSM  [facility]  [facility] - Department of Orthopedic Surgery       Disclaimer:  This note was prepared and written using Dragon Medical dictation software. As a result, there may be errors in the script that have gone undetected. Please consider this when interpreting the information in this note.

## 2025-02-26 NOTE — Clinical Note
Hello,  I saw this patient for an ADHD and psychological assessment. I will forward you my report and findings upon completion. Please let me know if you have any questions or concerns.  Loulou Mendoza, PhD,  Clinical Psychologist

## 2025-02-27 ENCOUNTER — OFFICE VISIT (OUTPATIENT)
Dept: ORTHOPEDICS | Facility: CLINIC | Age: 50
End: 2025-02-27
Payer: COMMERCIAL

## 2025-02-27 DIAGNOSIS — M65.4 DE QUERVAIN'S TENOSYNOVITIS, BILATERAL: Primary | ICD-10-CM

## 2025-02-27 RX ORDER — LIDOCAINE HYDROCHLORIDE 10 MG/ML
0.5 INJECTION, SOLUTION INFILTRATION; PERINEURAL
Status: COMPLETED | OUTPATIENT
Start: 2025-02-27 | End: 2025-02-27

## 2025-02-27 RX ORDER — TRIAMCINOLONE ACETONIDE 40 MG/ML
20 INJECTION, SUSPENSION INTRA-ARTICULAR; INTRAMUSCULAR
Status: COMPLETED | OUTPATIENT
Start: 2025-02-27 | End: 2025-02-27

## 2025-02-27 RX ADMIN — LIDOCAINE HYDROCHLORIDE 0.5 ML: 10 INJECTION, SOLUTION INFILTRATION; PERINEURAL at 15:13

## 2025-02-27 RX ADMIN — TRIAMCINOLONE ACETONIDE 20 MG: 40 INJECTION, SUSPENSION INTRA-ARTICULAR; INTRAMUSCULAR at 15:13

## 2025-02-27 NOTE — LETTER
2025      Radha Beckwith  [address]  [facility] [address]      Dear Colleague,    Thank you for referring your patient, Radha Beckwith, to the [facility] [place]. Please see a copy of my visit note below.    Radha Beckwith  :  1975  DOS: 2025  MRN: [id number]  PCP: Gisselle Linn    Sports Medicine Clinic Visit    Interim History - 2025  - Last seen on 2024 for bilateral De Quervain's tenosynovitis  .   - Bilateral De Quervain's tenosynovitis injections completed on 2024 provided at least some relief of her symptoms, difficult to tell based on other procedures and pains that were going on at the time.  - Since the last visit, she notes that she had had 2 MBB and a radiofrequency ablation of the cervical region that has increased her base of the neck tingling. Bilateral radial wrist pain has not decreased with these procedures. Patient had high hopes it would have. Pain with scooping ice cream and twisting a door handle now which continue to get worse.  Interested in injections today.      Interim History - 2024  - Last seen on 2024 for bilateral de Quervain's tenosynovitis, right worse than left.  Cervical radiculopathy does not seem to be a major component of her symptoms.  Trial of Medrol Dosepak, recommended keeping a pain log and instructed on thumb spica wrist brace proper use.   - She notes that medrol dosepak was not helpful. Continues to wear thumb spica braces. Also notes she has had bilateral C3-5 MBB and KINDRA injections also that were not helpful for wrist symptoms.  - Plan was to follow-up in 2 to 3 weeks, other medical conditions and mental health concerns delayed follow-up, which is reasonable. Consideration of deQuervain's tendon sheath injections today if needed.     - Since the last visit, she notes continued bilateral radial wrist pain. Interested in injections today.       Initial  Visit: June 6, 2024  HPI  Radha Beckwith is a 48 year old female who is seen in consultation at the request of  Jenny Landrum C.N.P. presenting with bilateral hand pain.    - Mechanism of Injury:    - No inciting injury  - Pertinent history and prior evaluations:    - Hx of cervical radiculopathy and chronic low back pain.  S/p C6-C7 IL JASE, most recent in 2023 and 4/19/2024.  Suspected of carpal tunnel syndrome and de Quervain's tenosynovitis bilaterally in the past.  - 8/8/2023 BUE EMG within normal limits without electrodiagnostic evidence of median mononeuropathies at the wrists.  Also needled the right side, which was normal.  - Seen in 2021 and 2022 for bilateral hand weakness. Did occupational therapy. Was working with Dr. Pat Patterson.  Then saw Dr. Gonsalves on 6/20/2023 and 9/28/2023, has been using multiple wrist splints and Voltaren gel.  No previous injections.  Suspected probable de Quervain's tenosynovitis bilaterally and cervical radiculopathy contributing to her pain, not classic for CTS.  Recommended consideration of cervical JASE and repeat EMG if needed.    - Pain Character:    - Location:  bilateral base of thumbs into radial wrist and bilateral long fingers  - Character:  burning, pins and needles, electric shock  - Duration:  chronic  - Course:  worsening  - Endorses:    - numbness, tingling occasionally in the superficial radial nerve distribution, hand weakness, right wrist clicking, right long finger triggering, swelling in fingers  - Denies:    - instability, radicular shooting pain  - Alleviating factors:    -  thumb spica wrist braces , topicals  - Aggravating factors:    - gripping, weather changes  - Other treatments tried:    - thumb spica wrist braces (helpful but limiting what she can do with braces on), Voltaren, unable to take IBU, heat, cold, Lidocaine patches, topical     - Patient Goals:    - Referred for hand/wrist injections  - Social History:   -  Currently not working:  working on getting on disability      Review of Systems  Musculoskeletal: as above  Remainder of review of systems is negative including constitutional, CV, pulmonary, GI, Skin and Neurologic except as noted in HPI or medical history.    Past Medical History:   Diagnosis Date     Abdominal pain, right lower quadrant 03/09/2008    Admit. Discharged 03/10/08     Anxiety attack 07/31/2015     Atypical chest pain 06/23/2015     De Quervain's disease (tenosynovitis)      Dehydration      Depressive disorder 1996     Gastric ulcer 07/31/2015     GERD (gastroesophageal reflux disease) 07/28/2010    Takes omeprazole and metoclopramide      Hypertension 2017     Ingrowing nail 01/09/2014     Migraines      Opioid dependence in remission (H) 08/02/2015     Other and unspecified ovarian cyst      Papanicolaou smear of cervix with low grade squamous intraepithelial lesion (LGSIL) 07/07/2017     Past Surgical History:   Procedure Laterality Date     BIOPSY CERVICAL, LOCAL EXCISION, SINGLE/MULTIPLE N/A 8/10/2017    Procedure: BIOPSY CERVICAL, LOCAL EXCISION, SINGLE/MULTIPLE;;  Surgeon: Michael Chandler MD;  Location: [facility]     COLONOSCOPY N/A 1/6/2023    Procedure: COLONOSCOPY;  Surgeon: Michael Dozier MD;  [facility]     COLONOSCOPY N/A 1/28/2025    Procedure: COLONOSCOPY, WITH POLYPECTOMY AND BIOPSY;  Surgeon: John Paul Varela MD;  [facility]     COLPOSCOPY, BIOPSY, COMBINED N/A 8/10/2017    Procedure: COMBINED COLPOSCOPY, BIOPSY;  Colposcopy with Cervical Biopsies and Endometrial Biopsy, Exam with Ultrasound;  Surgeon: Michael Chandler MD;  [facility]     ESOPHAGOSCOPY, GASTROSCOPY, DUODENOSCOPY (EGD), COMBINED N/A 4/17/2017    Procedure: COMBINED ESOPHAGOSCOPY, GASTROSCOPY, DUODENOSCOPY (EGD);  Surgeon: Ibrahima Esposito MD;  [facility]     ESOPHAGOSCOPY, GASTROSCOPY, DUODENOSCOPY (EGD), COMBINED N/A 1/6/2023    Procedure: ESOPHAGOGASTRODUODENOSCOPY, WITH BIOPSY;  Surgeon: Jacoby  MD Michael;  [facility]     ESOPHAGOSCOPY, GASTROSCOPY, DUODENOSCOPY (EGD), COMBINED N/A 10/3/2023    Procedure: ESOPHAGOGASTRODUODENOSCOPY, WITH BIOPSY;  Surgeon: John Paul Varela MD;  [facility]     EXAM UNDER ANESTHESIA PELVIC N/A 8/10/2017    Procedure: EXAM UNDER ANESTHESIA PELVIC;;  Surgeon: Michael Chandler MD;  [facility]     HERNIORRHAPHY, INCISIONAL, ROBOT-ASSISTED, LAPAROSCOPIC, USING DA JERRELL XI N/A 8/30/2024    Procedure: HERNIORRHAPHY, INCISIONAL, ROBOT-ASSISTED, LAPAROSCOPIC, USING DA JERRELL XI;  Surgeon: Michael Dozier MD;  [facility]     HYSTERECTOMY       HYSTERECTOMY, PAP NO LONGER INDICATED       INJECT EPIDURAL CERVICAL N/A 10/20/2023    Procedure: Cervical 6-7 Interlaminar epidural steroid injection using fluoroscopic guidance with contrast dye.;  Surgeon: Trevor Ivory MD;  [facility]     INJECT EPIDURAL CERVICAL N/A 4/19/2024    Procedure: Cervical 6 - Cervical 7 Interlaminar epidural steroid injection using fluoroscopic guidance with contrast dye.;  Surgeon: Trevor Ivory MD;  [facility]     INJECT EPIDURAL LUMBAR Bilateral 7/1/2022    Procedure: Lumbar 5-Sacral 1 Transforaminal Epidural Steroid Injection with fluoroscopic guidance and contrast, bilateral;  Surgeon: Trevor Ivory MD;  [facility]     LAPAROSCOPIC CHOLECYSTECTOMY N/A 2/2/2018    Procedure: LAPAROSCOPIC CHOLECYSTECTOMY;  Laparoscopic Cholecystectomy;  Surgeon: Tigre Lowry DO;  [facility]     LAPAROSCOPIC HYSTERECTOMY TOTAL N/A 10/30/2017    Procedure: LAPAROSCOPIC HYSTERECTOMY TOTAL;  LAPAROSCOPIC HYSTERECTOMY TOTAL POSSIBLE SALPINGO-OOPHERECTOMY (BILATERAL);  Surgeon: Michael Chandler MD;  Location: [facility]     ZZHC UGI ENDOSCOPY, SIMPLE EXAM  01/07/08     Family History   Problem Relation Age of Onset     Depression Mother      Respiratory Mother      Chronic Obstructive Pulmonary Disease Mother      Hypertension Mother      Anxiety Disorder  Mother      Cerebrovascular Disease Father         First stroke at age 28,  from 2nd when he was 55. Brain aneurysms.     Breast Cancer Cousin      Adrenal Disorder Other      Chronic Obstructive Pulmonary Disease Other      Asthma Brother          Objective  LMP  (LMP Unknown)     General: healthy, alert and in no acute distress.    HEENT: no scleral icterus or conjunctival erythema.   Skin: no suspicious lesions or rash. No jaundice.   CV: regular rhythm by palpation, 2+ distal pulses.  Resp: normal respiratory effort without conversational dyspnea.   Psych: normal mood and affect.    Gait: nonantalgic, appropriate coordination and balance.     Neuro:        - Sensation to light touch:    - Intact throughout the BUE including all peripheral nerve distributions.        - Special tests:   - Tinel's at the wrist:  Neg    - Tinel's at the elbow:  Neg    - Stressed Phalen's:  Neg     MSK - Wrist/Hand:        - Inspection:    - No surrounding major swelling, erythema, warmth, ecchymosis, lesion, or atrophy noted.        - ROM:    - Full AROM/PROM with pain during thumb abduction, extension       - Palpation:    - TTP at the de Quervain's tendon sheath bilaterally, right worse than left.   - NTTP elsewhere.        - Strength:  (*antalgic)   - Forearm Pronation   5   - Forearm Supination   5   - Wrist Extension   5   - Wrist Flexion    5   - FDI     5   - ADM     5   - FPL     5   - APB     5   - EIP     5   - EDC     5   - APL/EPB    5-*            - Special tests:        - CMC grind:  Neg    - Finkelstein's:  ++Pos, R > L   - TFCC compression:  Neg    - Fulton's:  Neg       Radiology  I independently reviewed the available relevant imaging in the chart with my interpretations as above in HPI.   - XR B/L hands 2024 shows normal anatomic alignment without significant degenerative changes, no acute fractures or dislocations.    Procedure  Hand / Upper Extremity Injection/Arthrocentesis: bilateral extensor  compartment 1    Date/Time: 2/27/2025 3:13 PM    Performed by: Jose Adame DO  Authorized by: Jose Adame DO    Indications:  Pain  Needle Size:  27 G  Guidance: ultrasound    Approach:  Radial  Condition: de Quervain's    Laterality:  Bilateral    Site:  Bilateral extensor compartment 1  Medications (Right):  20 mg triamcinolone 40 MG/ML; 0.5 mL lidocaine 1 %  Medications (Left):  20 mg triamcinolone 40 MG/ML; 0.5 mL lidocaine 1 %  Outcome:  Tolerated well, no immediate complications  Procedure discussed: discussed risks, benefits, and alternatives    Consent Given by:  Patient  Prep: patient was prepped and draped in usual sterile fashion     Ultrasound images of procedure were permanently stored.     Procedure:   De Quervain's Tendon Sheath Injection  Consent given, and signed on chart. The area was prepped in typical sterile fashion with chlorhexidine solution. First dorsal compartment thumb extensor tendons (APL/EPB) were identified under ultrasound on long and short axis.  A 1.5 inch 27 gauge needle was placed into the first dorsal compartment tendon sheath between the APL and EPB tendons, taking care not to inject the tendons themselves.  A mixture of 0.5ml 1% lidocaine and 0.5 ml kenalog (40mg/ml) was injected without difficulty. Ultrasound documentation of needle placement and injection was achieved.  After removal of the needle, the area was cleaned, hemostasis achieved, and bandage applied.  Patient tolerated the procedure well, no complications.      Assessment  1. De Quervain's tenosynovitis, bilateral            Plan  Radha Beckwith is a pleasant 48 year old female that presents with chronic bilateral hand pain, right worse than left.  She has multiple areas of pain, worst is in the radial wrist and thumb that hurts the most with repetitive hand motions, hand , thumb abduction/extension.  Very positive Finkelstein's maneuver today on exam.  She does have some pain elsewhere in the  wrists and some numbness/tingling in the hand.  The numbness and tingling appears most consistent with superficial radial neuropathy, may have some CTS paresthesias as well.  Radiographs within normal limits without significant degenerative changes, fractures, or dislocations.  She has been trying thumb spica bracing in the past which she wears only at night, and has not helped fully, no injections in the past.  No significant radicular shooting pain throughout the arm.  History and physical exam appear most consistent with de Quervain's tenosynovitis bilaterally, right worse than left.  She does not seem to have a major component of cervical radiculopathy affecting her symptoms.    We discussed the nature of the condition and available treatment options, and mutually agreed upon the following plan:    She would like to start by trying a Medrol Dosepak to see how much pain relief she gets from the burst pack, she will keep a pain log of what pain improves and what pain does.  Instructed on proper use of the thumb spica brace which will be to use it during exacerbating activities, do not necessarily need to wear it at night or at rest unless she finds it helpful.  She will try this new regimen for thumb spica bracing.  Also talked about more low-profile braces such as a gamekeeper brace, which she may take from clinic or purchase on her own.  Try to gamekeeper brace on in clinic and decided not to take 1 today.  She will use Voltaren gel and other topicals with Tylenol as needed for breakthrough pain.    We will follow-up in 2 to 3 weeks to evaluate how much improvement she got from the Medrol Dosepak and bracing modifications, may consider de Quervain's tendon sheath corticosteroid injection at that time if desired.  May contact clinic for questions/concerns as needed.    Updated Plan - 8/22/24:  Linette presents for follow-up of her bilateral wrist and thumb pain secondary to de Quervain's tenosynovitis.  She has  undergone multiple surgical procedures including MBB/RFA and JASE which did not change the pain in the radial wrist/thumb.  She has been wearing thumb spica braces and continues to have persistent symptoms.  Interested in previously discussed de Quervain's tenosynovitis corticosteroid injections today.  Performed bilateral DQ injections in clinic without complication and immediate improvement in pain.  May follow-up in 3+ months as needed if symptoms return.  May continue to use thumb spica braces as needed.      Update - 2/27/25:  Linette presents today for follow-up of her de Quervain's tenosynovitis bilaterally.  She is unsure how much relief she got from the previous corticosteroid injections due to other procedures and pain going out of that time, but she did note that it did help for at least a short period of time.  She now continues to have pain of de Quervain's tendons that hurts worse with thumb extension and abduction and handgrip activities.  She is hopeful to get injections today and evaluate for response.  She is hopeful that they will give better relief now that some of the other pain that has gone away.  We did perform bilateral de Quervain's tendon sheath corticosteroid injections today in clinic without complication.  She may follow-up in 3+ months as needed if symptoms return.      Jose Adame DO, CAQSM  [facility]  [facility] - Department of Orthopedic Surgery       Disclaimer:  This note was prepared and written using Dragon Medical dictation software. As a result, there may be errors in the script that have gone undetected. Please consider this when interpreting the information in this note.       Again, thank you for allowing me to participate in the care of your patient.        Sincerely,        Jose Adame DO    Electronically signed

## 2025-03-03 ENCOUNTER — VIRTUAL VISIT (OUTPATIENT)
Dept: PSYCHOLOGY | Facility: CLINIC | Age: 50
End: 2025-03-03
Payer: COMMERCIAL

## 2025-03-03 DIAGNOSIS — F41.1 GENERALIZED ANXIETY DISORDER: ICD-10-CM

## 2025-03-03 DIAGNOSIS — F33.1 MODERATE EPISODE OF RECURRENT MAJOR DEPRESSIVE DISORDER (H): Primary | ICD-10-CM

## 2025-03-03 PROCEDURE — 90834 PSYTX W PT 45 MINUTES: CPT | Mod: 95 | Performed by: COUNSELOR

## 2025-03-03 ASSESSMENT — PATIENT HEALTH QUESTIONNAIRE - PHQ9
10. IF YOU CHECKED OFF ANY PROBLEMS, HOW DIFFICULT HAVE THESE PROBLEMS MADE IT FOR YOU TO DO YOUR WORK, TAKE CARE OF THINGS AT HOME, OR GET ALONG WITH OTHER PEOPLE: EXTREMELY DIFFICULT
SUM OF ALL RESPONSES TO PHQ QUESTIONS 1-9: 24
10. IF YOU CHECKED OFF ANY PROBLEMS, HOW DIFFICULT HAVE THESE PROBLEMS MADE IT FOR YOU TO DO YOUR WORK, TAKE CARE OF THINGS AT HOME, OR GET ALONG WITH OTHER PEOPLE: EXTREMELY DIFFICULT
SUM OF ALL RESPONSES TO PHQ QUESTIONS 1-9: 24

## 2025-03-03 NOTE — PROGRESS NOTES
Answers submitted by the patient for this visit:  Patient Health Questionnaire (Submitted on 3/3/2025)  If you checked off any problems, how difficult have these problems made it for you to do your work, take care of things at home, or get along with other people?: Extremely difficult  PHQ9 TOTAL SCORE: 24  Patient Health Questionnaire (G7) (Submitted on 2/24/2025)  BO 7 TOTAL SCORE: 20          [facility]                                     Progress Note    Patient Name: Radha Beckwith  Date: 3/3/25         Service Type: Individual      Session Start Time: 2:30pm Session End Time: 3:20pm     Session Length:  50    Session #: 71    Attendees: Client    Service Modality:  video      Provider verified identity through the following two step process.  Patient provided:  Patient is known previously to provider    Telemedicine Visit: The patient's condition can be safely assessed and treated via synchronous audio and visual telemedicine encounter.      Reason for Telemedicine Visit: Patient convenience (e.g. access to timely appointments / distance to available provider)    Originating Site (Patient Location): Patient's home    Distant Site (Provider Location): Provider Remote Setting- Home Office    Consent:  The patient/guardian has verbally consented to: the potential risks and benefits of telemedicine (video visit) versus in person care; bill my insurance or make self-payment for services provided; and responsibility for payment of non-covered services.     Patient would like the video invitation sent by:  My Chart    Mode of Communication:  telephone- client did not have access to wi/fi to be able to do video session    Distant Location (Provider):  Off-site home office    As the provider I attest to compliance with applicable laws and regulations related to telemedicine.    There has been demonstrated improvement in functioning while patient has been engaged in psychotherapy/psychological  service- if withdrawn the patient would deteriorate and/or relapse.      DATA  Interactive Complexity: No  Crisis: No        Progress Since Last Session (Related to Symptoms / Goals / Homework):   Symptoms: No change .    Homework: Completed in session      Episode of Care Goals: Satisfactory progress - MAINTENANCE (Working to maintain change, with risk of relapse); Intervened by continuing to positively reinforce healthy behavior choice      Current / Ongoing Stressors and Concerns:   Started the ADHD testing. Has been really tired and getting a lot of awful headaches that are happening almost everyday. Had that medication change awhile ago and thinks that might be impacting her.      Treatment Objective(s) Addressed in This Session:   Increase interest, engagement, and pleasure in doing things  Feel less tired and more energy during the day        Intervention:   Talked about how the testing is going. Stated she has been so anxious about filling out the paperwork and doesn't understand why. Talked about mindfulness and just noticing in the present moment. Talked about the headaches and the fatigue she's been feeling.      Assessments completed prior to visit:  The following assessments were completed by patient for this visit:  PHQ9:       1/6/2025     3:29 PM 1/12/2025     1:19 PM 2/7/2025    12:36 PM 2/17/2025    12:20 PM 2/24/2025    12:02 PM 2/25/2025     6:49 PM 3/3/2025     9:02 AM   PHQ-9 SCORE   PHQ-9 Total Score MyChart 24 (Severe depression) 23 (Severe depression)  23 (Severe depression) 24 (Severe depression) 24 (Severe depression) 24 (Severe depression)   PHQ-9 Total Score 24  23  0 23  24  24  24        Patient-reported     GAD7:       10/27/2024     7:59 PM 11/10/2024     7:03 PM 12/1/2024     4:42 PM 12/16/2024     1:05 PM 1/6/2025     3:30 PM 2/5/2025     7:35 PM 2/24/2025    12:03 PM   BO-7 SCORE   Total Score 20 (severe anxiety) 19 (severe anxiety) 20 (severe anxiety) 18 (severe anxiety) 21  (severe anxiety) 20 (severe anxiety) 20 (severe anxiety)   Total Score 20  19  20  18  21  20  20        Patient-reported     PROMIS 10-Global Health (all questions and answers displayed):       8/10/2024     2:51 PM 8/18/2024    11:36 AM 8/28/2024     2:07 PM 12/1/2024     4:43 PM 12/16/2024     1:07 PM 1/6/2025     3:31 PM 2/24/2025    12:00 PM   PROMIS 10   In general, would you say your health is: Fair Poor Poor Poor Poor Poor Poor   In general, would you say your quality of life is: Poor Fair Fair Poor Poor Poor Poor   In general, how would you rate your physical health? Poor Poor Fair Poor Poor Fair Poor   In general, how would you rate your mental health, including your mood and your ability to think? Poor Poor Poor Poor Poor Poor Poor   In general, how would you rate your satisfaction with your social activities and relationships? Poor Fair Fair Poor Fair Fair Poor   In general, please rate how well you carry out your usual social activities and roles Poor Fair Fair Poor Poor Poor Poor   To what extent are you able to carry out your everyday physical activities such as walking, climbing stairs, carrying groceries, or moving a chair? A little A little A little A little A little A little A little   In the past 7 days, how often have you been bothered by emotional problems such as feeling anxious, depressed, or irritable? Always Always Always Always Always Always Always   In the past 7 days, how would you rate your fatigue on average? Very severe Very severe Severe Severe Very severe Very severe Severe   In the past 7 days, how would you rate your pain on average, where 0 means no pain, and 10 means worst imaginable pain? 7 7 7 8 7 8 7   In general, would you say your health is: 2 1 1 1 1 1 1   In general, would you say your quality of life is: 1 2 2 1 1 1 1   In general, how would you rate your physical health? 1 1 2 1 1 2 1   In general, how would you rate your mental health, including your mood and your  ability to think? 1 1 1 1 1 1 1   In general, how would you rate your satisfaction with your social activities and relationships? 1 2 2 1 2 2 1   In general, please rate how well you carry out your usual social activities and roles. (This includes activities at home, at work and in your community, and responsibilities as a parent, child, spouse, employee, friend, etc.) 1 2 2 1 1 1 1   To what extent are you able to carry out your everyday physical activities such as walking, climbing stairs, carrying groceries, or moving a chair? 2 2 2 2 2 2 2   In the past 7 days, how often have you been bothered by emotional problems such as feeling anxious, depressed, or irritable? 5 5 5 5 5 5 5   In the past 7 days, how would you rate your fatigue on average? 5 5 4 4 5 5 4   In the past 7 days, how would you rate your pain on average, where 0 means no pain, and 10 means worst imaginable pain? 7 7 7 8 7 8 7   Global Mental Health Score 4    4 6 6 4  5  5  4    Global Physical Health Score 6    6 6 8 7  6  7  7    PROMIS TOTAL - SUBSCORES 10    10 12 14 11  11  12  11        Patient-reported     Divide Suicide Severity Rating Scale (Lifetime/Recent)      8/31/2024    12:07 PM 9/5/2024     5:33 PM 9/13/2024    10:52 AM 10/4/2024     4:54 PM 11/22/2024    11:34 AM 12/25/2024    11:30 PM 1/28/2025     1:23 PM   Divide Suicide Severity Rating (Lifetime/Recent)   Q1 Wished to be Dead (Past Month) 0-->no 0-->no 0-->no 0-->no 0-->no 0-->no 0-->no   Q2 Suicidal Thoughts (Past Month) 0-->no 0-->no 0-->no 0-->no 0-->no 0-->no 0-->no   Q6 Suicide Behavior (Lifetime) 0-->no 1-->yes 0-->no 0-->no 0-->no 0-->no 0-->no   If yes to Q6, within past 3 months?  0-->no        Level of Risk per Screen no risks indicated moderate risk no risks indicated no risks indicated no risks indicated no risks indicated no risks indicated         ASSESSMENT: Current Emotional / Mental Status (status of significant symptoms):   Risk status (Self / Other harm  or suicidal ideation)   Patient denies current fears or concerns for personal safety.   Patient denies current or recent suicidal ideation or behaviors.   Patient denies current or recent homicidal ideation or behaviors.   Patient denies current or recent self injurious behavior or ideation.   Patient denies other safety concerns.   Patient reports there has been no change in risk factors since their last session.     Patient reports there has been no change in protective factors since their last session.     Recommended that patient call 911 or go to the local ED should there be a change in any of these risk factors.     Appearance:   Appropriate    Eye Contact:   Good    Psychomotor Behavior: Normal    Attitude:   Cooperative    Orientation:   All   Speech    Rate / Production: Normal/ Responsive Normal     Volume:  Normal    Mood:    Normal   Affect:    Appropriate    Thought Content:  Clear    Thought Form:  Coherent    Insight:    Good      Medication Review:   No changes to current psychiatric medication(s)     Medication Compliance:   Yes     Changes in Health Issues:   None reported     Chemical Use Review:   Substance Use: Chemical use reviewed, no active concerns identified      Tobacco Use: No change in amount of tobacco use since last session.  Patient declined discussion at this time    Diagnosis:  1. Moderate episode of recurrent major depressive disorder (H)    2. Generalized anxiety disorder            Collateral Reports Completed:   Not Applicable    PLAN: (Patient Tasks / Therapist Tasks / Other)  Continue to use stress reduction skills daily. Follow up with doctor about headaches.               Ricarda Bhatia [facility]                                                         ______________________________________________________________________    Individual Treatment Plan    Patient's Name: Radha Beckwith  YOB: 1975    Date of Creation: 6/28/23  Date Treatment Plan Last  Reviewed/Revised: 2/25/25    DSM5 Diagnoses: 296.32 (F33.1) Major Depressive Disorder, Recurrent Episode, Moderate _  Psychosocial / Contextual Factors: living with boyfriend, memory issues  PROMIS (reviewed every 90 days):     Referral / Collaboration:  Referral to another professional/service is not indicated at this time..    Anticipated number of session for this episode of care: 9-12 sessions  Anticipation frequency of session:  as needed  Anticipated Duration of each session: 38-52 minutes  Treatment plan will be reviewed in 90 days or when goals have been changed.       MeasurableTreatment Goal(s) related to diagnosis / functional impairment(s)  Goal 1: Patient will increase communication skills with family members.    Objective #A (Patient Action)    Patient will learn & utilize at least 2 assertive communication skills weekly.  Status: Continued - Date(s): 2/25/25    Intervention(s)  Therapist will teach emotional regulation skills. distress tolerance skills, interpersonal effectiveness skills, emotion regluation skills, mindfulness skills, radical acceptance. Therapist will teach client how to ID body cues for anxiety, anxiety reduction techniques, how to ID triggers for depression and anxiety- decrease reactivity/eliminate, lifestyle changes to reduce depression and anxiety, communication skills, explore cognitive beliefs and help client to develop healthy cognitive patterns and beliefs.    Objective #B  Patient will use thought-stopping strategy daily to reduce intrusive thoughts.  Status: Continued - Date(s): 2/25/25    Intervention(s)  Therapist will teach emotional regulation skills. distress tolerance skills, interpersonal effectiveness skills, emotion regluation skills, mindfulness skills, radical acceptance. Therapist will teach client how to ID body cues for anxiety, anxiety reduction techniques, how to ID triggers for depression and anxiety- decrease reactivity/eliminate, lifestyle changes to  reduce depression and anxiety, communication skills, explore cognitive beliefs and help client to develop healthy cognitive patterns and beliefs.      Goal 2: Patient will decrease depression symptoms.      Objective #A (Patient Action)    Status: Continued - Date(s): 2/25/25    Patient will Increase interest, engagement, and pleasure in doing things  Decrease frequency and intensity of feeling down, depressed, hopeless  Improve quantity and quality of night time sleep / decrease daytime naps  Feel less tired and more energy during the day   Improve diet, appetite, mindful eating, and / or meal planning  Identify negative self-talk and behaviors: challenge core beliefs, myths, and actions  Improve concentration, focus, and mindfulness in daily activities   Feel less fidgety, restless or slow in daily activities / interpersonal interactions.    Intervention(s)  Therapist will teach emotional regulation skills. distress tolerance skills, interpersonal effectiveness skills, emotion regluation skills, mindfulness skills, radical acceptance. Therapist will teach client how to ID body cues for anxiety, anxiety reduction techniques, how to ID triggers for depression and anxiety- decrease reactivity/eliminate, lifestyle changes to reduce depression and anxiety, communication skills, explore cognitive beliefs and help client to develop healthy cognitive patterns and beliefs.    Objective #B  Patient will identify three distraction and diversion activities and use those activities to decrease level of anxiety  .    Status: Continued - Date(s):  2/25/25    Intervention(s)  Therapist will teach emotional regulation skills. distress tolerance skills, interpersonal effectiveness skills, emotion regluation skills, mindfulness skills, radical acceptance. Therapist will teach client how to ID body cues for anxiety, anxiety reduction techniques, how to ID triggers for depression and anxiety- decrease reactivity/eliminate, lifestyle  changes to reduce depression and anxiety, communication skills, explore cognitive beliefs and help client to develop healthy cognitive patterns and beliefs.      Patient has reviewed and agreed to the above plan.      Ricarda Bhatia [facility]

## 2025-03-05 ENCOUNTER — VIRTUAL VISIT (OUTPATIENT)
Dept: PSYCHOLOGY | Facility: CLINIC | Age: 50
End: 2025-03-05
Payer: COMMERCIAL

## 2025-03-05 DIAGNOSIS — F33.1 MODERATE EPISODE OF RECURRENT MAJOR DEPRESSIVE DISORDER (H): Primary | ICD-10-CM

## 2025-03-05 DIAGNOSIS — F41.1 GENERALIZED ANXIETY DISORDER: ICD-10-CM

## 2025-03-05 PROCEDURE — 90837 PSYTX W PT 60 MINUTES: CPT | Mod: 95 | Performed by: PSYCHOLOGIST
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
## 2025-03-10 ENCOUNTER — VIRTUAL VISIT (OUTPATIENT)
Dept: PSYCHOLOGY | Facility: CLINIC | Age: 50
End: 2025-03-10
Payer: COMMERCIAL

## 2025-03-10 DIAGNOSIS — F33.1 MODERATE EPISODE OF RECURRENT MAJOR DEPRESSIVE DISORDER (H): Primary | ICD-10-CM

## 2025-03-10 DIAGNOSIS — F41.1 GENERALIZED ANXIETY DISORDER: ICD-10-CM

## 2025-03-10 PROCEDURE — 90834 PSYTX W PT 45 MINUTES: CPT | Mod: 95 | Performed by: COUNSELOR

## 2025-03-10 NOTE — PROGRESS NOTES
Answers submitted by the patient for this visit:  Patient Health Questionnaire (Submitted on 3/10/2025)  If you checked off any problems, how difficult have these problems made it for you to do your work, take care of things at home, or get along with other people?: Extremely difficult  PHQ9 TOTAL SCORE: 22        [facility]                                     Progress Note    Patient Name: Radha Beckwith  Date: 3/10/25         Service Type: Individual      Session Start Time: 2:30pm Session End Time: 3:20pm     Session Length:  50    Session #: 72    Attendees: Client    Service Modality:  video      Provider verified identity through the following two step process.  Patient provided:  Patient is known previously to provider    Telemedicine Visit: The patient's condition can be safely assessed and treated via synchronous audio and visual telemedicine encounter.      Reason for Telemedicine Visit: Patient convenience (e.g. access to timely appointments / distance to available provider)    Originating Site (Patient Location): Patient's home    Distant Site (Provider Location): Provider Remote Setting- Home Office    Consent:  The patient/guardian has verbally consented to: the potential risks and benefits of telemedicine (video visit) versus in person care; bill my insurance or make self-payment for services provided; and responsibility for payment of non-covered services.     Patient would like the video invitation sent by:  My Chart    Mode of Communication:  telephone- client did not have access to wi/fi to be able to do video session    Distant Location (Provider):  Off-site home office    As the provider I attest to compliance with applicable laws and regulations related to telemedicine.    There has been demonstrated improvement in functioning while patient has been engaged in psychotherapy/psychological service- if withdrawn the patient would deteriorate and/or relapse.   "    DATA  Interactive Complexity: No  Crisis: No        Progress Since Last Session (Related to Symptoms / Goals / Homework):   Symptoms: No change .    Homework: Completed in session      Episode of Care Goals: Satisfactory progress - MAINTENANCE (Working to maintain change, with risk of relapse); Intervened by continuing to positively reinforce healthy behavior choice      Current / Ongoing Stressors and Concerns:   Stated she has a cold right now.   Had a good conversation this past weekend with her son.   Has been able to remember several experiences from her childhood while doing the ADHD testing. A lot have been good memories.      Treatment Objective(s) Addressed in This Session:   Increase interest, engagement, and pleasure in doing things  Feel less tired and more energy during the day        Intervention:   Processed the conversation she had with her son and how she's noticed the difference with how they communicated. Talked through the memories that are coming back. Client is curious about why she is suddenly able to recall these things as it \"feels weird\" to her. Educated client about differences with feelings and emotions and how to be curious/non-judgmental about them.     Assessments completed prior to visit:  The following assessments were completed by patient for this visit:  PHQ9:       1/12/2025     1:19 PM 2/7/2025    12:36 PM 2/17/2025    12:20 PM 2/24/2025    12:02 PM 2/25/2025     6:49 PM 3/3/2025     9:02 AM 3/10/2025     2:30 PM   PHQ-9 SCORE   PHQ-9 Total Score MyChart 23 (Severe depression)  23 (Severe depression) 24 (Severe depression) 24 (Severe depression) 24 (Severe depression) 22 (Severe depression)   PHQ-9 Total Score 23  0 23  24  24  24  22        Patient-reported     GAD7:       10/27/2024     7:59 PM 11/10/2024     7:03 PM 12/1/2024     4:42 PM 12/16/2024     1:05 PM 1/6/2025     3:30 PM 2/5/2025     7:35 PM 2/24/2025    12:03 PM   BO-7 SCORE   Total Score 20 (severe anxiety) 19 " (severe anxiety) 20 (severe anxiety) 18 (severe anxiety) 21 (severe anxiety) 20 (severe anxiety) 20 (severe anxiety)   Total Score 20  19  20  18  21  20  20        Patient-reported     PROMIS 10-Global Health (all questions and answers displayed):       8/10/2024     2:51 PM 8/18/2024    11:36 AM 8/28/2024     2:07 PM 12/1/2024     4:43 PM 12/16/2024     1:07 PM 1/6/2025     3:31 PM 2/24/2025    12:00 PM   PROMIS 10   In general, would you say your health is: Fair Poor Poor Poor Poor Poor Poor   In general, would you say your quality of life is: Poor Fair Fair Poor Poor Poor Poor   In general, how would you rate your physical health? Poor Poor Fair Poor Poor Fair Poor   In general, how would you rate your mental health, including your mood and your ability to think? Poor Poor Poor Poor Poor Poor Poor   In general, how would you rate your satisfaction with your social activities and relationships? Poor Fair Fair Poor Fair Fair Poor   In general, please rate how well you carry out your usual social activities and roles Poor Fair Fair Poor Poor Poor Poor   To what extent are you able to carry out your everyday physical activities such as walking, climbing stairs, carrying groceries, or moving a chair? A little A little A little A little A little A little A little   In the past 7 days, how often have you been bothered by emotional problems such as feeling anxious, depressed, or irritable? Always Always Always Always Always Always Always   In the past 7 days, how would you rate your fatigue on average? Very severe Very severe Severe Severe Very severe Very severe Severe   In the past 7 days, how would you rate your pain on average, where 0 means no pain, and 10 means worst imaginable pain? 7 7 7 8 7 8 7   In general, would you say your health is: 2 1 1 1 1 1 1   In general, would you say your quality of life is: 1 2 2 1 1 1 1   In general, how would you rate your physical health? 1 1 2 1 1 2 1   In general, how would  you rate your mental health, including your mood and your ability to think? 1 1 1 1 1 1 1   In general, how would you rate your satisfaction with your social activities and relationships? 1 2 2 1 2 2 1   In general, please rate how well you carry out your usual social activities and roles. (This includes activities at home, at work and in your community, and responsibilities as a parent, child, spouse, employee, friend, etc.) 1 2 2 1 1 1 1   To what extent are you able to carry out your everyday physical activities such as walking, climbing stairs, carrying groceries, or moving a chair? 2 2 2 2 2 2 2   In the past 7 days, how often have you been bothered by emotional problems such as feeling anxious, depressed, or irritable? 5 5 5 5 5 5 5   In the past 7 days, how would you rate your fatigue on average? 5 5 4 4 5 5 4   In the past 7 days, how would you rate your pain on average, where 0 means no pain, and 10 means worst imaginable pain? 7 7 7 8 7 8 7   Global Mental Health Score 4    4 6 6 4  5  5  4    Global Physical Health Score 6    6 6 8 7  6  7  7    PROMIS TOTAL - SUBSCORES 10    10 12 14 11  11  12  11        Patient-reported     [place] Suicide Severity Rating Scale (Lifetime/Recent)      8/31/2024    12:07 PM 9/5/2024     5:33 PM 9/13/2024    10:52 AM 10/4/2024     4:54 PM 11/22/2024    11:34 AM 12/25/2024    11:30 PM 1/28/2025     1:23 PM   [place] Suicide Severity Rating (Lifetime/Recent)   Q1 Wished to be Dead (Past Month) 0-->no 0-->no 0-->no 0-->no 0-->no 0-->no 0-->no   Q2 Suicidal Thoughts (Past Month) 0-->no 0-->no 0-->no 0-->no 0-->no 0-->no 0-->no   Q6 Suicide Behavior (Lifetime) 0-->no 1-->yes 0-->no 0-->no 0-->no 0-->no 0-->no   If yes to Q6, within past 3 months?  0-->no        Level of Risk per Screen no risks indicated moderate risk no risks indicated no risks indicated no risks indicated no risks indicated no risks indicated         ASSESSMENT: Current Emotional / Mental Status (status of  significant symptoms):   Risk status (Self / Other harm or suicidal ideation)   Patient denies current fears or concerns for personal safety.   Patient denies current or recent suicidal ideation or behaviors.   Patient denies current or recent homicidal ideation or behaviors.   Patient denies current or recent self injurious behavior or ideation.   Patient denies other safety concerns.   Patient reports there has been no change in risk factors since their last session.     Patient reports there has been no change in protective factors since their last session.     Recommended that patient call 911 or go to the local ED should there be a change in any of these risk factors.     Appearance:   Appropriate    Eye Contact:   Good    Psychomotor Behavior: Normal    Attitude:   Cooperative    Orientation:   All   Speech    Rate / Production: Normal/ Responsive Normal     Volume:  Normal    Mood:    Normal   Affect:    Appropriate    Thought Content:  Clear    Thought Form:  Coherent    Insight:    Good      Medication Review:   No changes to current psychiatric medication(s)     Medication Compliance:   Yes     Changes in Health Issues:   None reported     Chemical Use Review:   Substance Use: Chemical use reviewed, no active concerns identified      Tobacco Use: No change in amount of tobacco use since last session.  Patient declined discussion at this time    Diagnosis:  1. Moderate episode of recurrent major depressive disorder (H)    2. Generalized anxiety disorder            Collateral Reports Completed:   Not Applicable    PLAN: (Patient Tasks / Therapist Tasks / Other)  Continue to use stress reduction skills daily and being curious about feelings.             Ricarda Bhatia, [facility]                                                         ______________________________________________________________________    Individual Treatment Plan    Patient's Name: Radha Beckwith  YOB: 1975    Date of  Creation: 6/28/23  Date Treatment Plan Last Reviewed/Revised: 2/25/25    DSM5 Diagnoses: 296.32 (F33.1) Major Depressive Disorder, Recurrent Episode, Moderate _  Psychosocial / Contextual Factors: living with boyfriend, memory issues  PROMIS (reviewed every 90 days):     Referral / Collaboration:  Referral to another professional/service is not indicated at this time..    Anticipated number of session for this episode of care: 9-12 sessions  Anticipation frequency of session:  as needed  Anticipated Duration of each session: 38-52 minutes  Treatment plan will be reviewed in 90 days or when goals have been changed.       MeasurableTreatment Goal(s) related to diagnosis / functional impairment(s)  Goal 1: Patient will increase communication skills with family members.    Objective #A (Patient Action)    Patient will learn & utilize at least 2 assertive communication skills weekly.  Status: Continued - Date(s): 2/25/25    Intervention(s)  Therapist will teach emotional regulation skills. distress tolerance skills, interpersonal effectiveness skills, emotion regluation skills, mindfulness skills, radical acceptance. Therapist will teach client how to ID body cues for anxiety, anxiety reduction techniques, how to ID triggers for depression and anxiety- decrease reactivity/eliminate, lifestyle changes to reduce depression and anxiety, communication skills, explore cognitive beliefs and help client to develop healthy cognitive patterns and beliefs.    Objective #B  Patient will use thought-stopping strategy daily to reduce intrusive thoughts.  Status: Continued - Date(s): 2/25/25    Intervention(s)  Therapist will teach emotional regulation skills. distress tolerance skills, interpersonal effectiveness skills, emotion regluation skills, mindfulness skills, radical acceptance. Therapist will teach client how to ID body cues for anxiety, anxiety reduction techniques, how to ID triggers for depression and anxiety- decrease  reactivity/eliminate, lifestyle changes to reduce depression and anxiety, communication skills, explore cognitive beliefs and help client to develop healthy cognitive patterns and beliefs.      Goal 2: Patient will decrease depression symptoms.      Objective #A (Patient Action)    Status: Continued - Date(s): 2/25/25    Patient will Increase interest, engagement, and pleasure in doing things  Decrease frequency and intensity of feeling down, depressed, hopeless  Improve quantity and quality of night time sleep / decrease daytime naps  Feel less tired and more energy during the day   Improve diet, appetite, mindful eating, and / or meal planning  Identify negative self-talk and behaviors: challenge core beliefs, myths, and actions  Improve concentration, focus, and mindfulness in daily activities   Feel less fidgety, restless or slow in daily activities / interpersonal interactions.    Intervention(s)  Therapist will teach emotional regulation skills. distress tolerance skills, interpersonal effectiveness skills, emotion regluation skills, mindfulness skills, radical acceptance. Therapist will teach client how to ID body cues for anxiety, anxiety reduction techniques, how to ID triggers for depression and anxiety- decrease reactivity/eliminate, lifestyle changes to reduce depression and anxiety, communication skills, explore cognitive beliefs and help client to develop healthy cognitive patterns and beliefs.    Objective #B  Patient will identify three distraction and diversion activities and use those activities to decrease level of anxiety  .    Status: Continued - Date(s):  2/25/25    Intervention(s)  Therapist will teach emotional regulation skills. distress tolerance skills, interpersonal effectiveness skills, emotion regluation skills, mindfulness skills, radical acceptance. Therapist will teach client how to ID body cues for anxiety, anxiety reduction techniques, how to ID triggers for depression and anxiety-  decrease reactivity/eliminate, lifestyle changes to reduce depression and anxiety, communication skills, explore cognitive beliefs and help client to develop healthy cognitive patterns and beliefs.      Patient has reviewed and agreed to the above plan.      ERIK Keller

## 2025-03-13 ENCOUNTER — TELEPHONE (OUTPATIENT)
Dept: ORTHOPEDICS | Facility: CLINIC | Age: 50
End: 2025-03-13
Payer: COMMERCIAL

## 2025-03-13 NOTE — TELEPHONE ENCOUNTER
Spoke with patient. She is understanding there are no results to convey at this time. Frida Hill, ATC

## 2025-03-13 NOTE — TELEPHONE ENCOUNTER
Requesting Results     Name/type of test: Arthrocentesis  Date of test: 2/27/25  Was test done at a location other than [facility] (Please fill in the location if not [facility])?: No    Could we send this information to you in [employer] or would you prefer to receive a phone call?:   Patient would prefer a phone call   Okay to leave a detailed message?: Yes at Cell number on file:    Telephone Information:   Mobile [phone]

## 2025-03-17 ASSESSMENT — ANXIETY QUESTIONNAIRES
5. BEING SO RESTLESS THAT IT IS HARD TO SIT STILL: MORE THAN HALF THE DAYS
3. WORRYING TOO MUCH ABOUT DIFFERENT THINGS: NEARLY EVERY DAY
IF YOU CHECKED OFF ANY PROBLEMS ON THIS QUESTIONNAIRE, HOW DIFFICULT HAVE THESE PROBLEMS MADE IT FOR YOU TO DO YOUR WORK, TAKE CARE OF THINGS AT HOME, OR GET ALONG WITH OTHER PEOPLE: EXTREMELY DIFFICULT
8. IF YOU CHECKED OFF ANY PROBLEMS, HOW DIFFICULT HAVE THESE MADE IT FOR YOU TO DO YOUR WORK, TAKE CARE OF THINGS AT HOME, OR GET ALONG WITH OTHER PEOPLE?: EXTREMELY DIFFICULT
GAD7 TOTAL SCORE: 20
4. TROUBLE RELAXING: NEARLY EVERY DAY
GAD7 TOTAL SCORE: 20
2. NOT BEING ABLE TO STOP OR CONTROL WORRYING: NEARLY EVERY DAY
7. FEELING AFRAID AS IF SOMETHING AWFUL MIGHT HAPPEN: NEARLY EVERY DAY
6. BECOMING EASILY ANNOYED OR IRRITABLE: NEARLY EVERY DAY
1. FEELING NERVOUS, ANXIOUS, OR ON EDGE: NEARLY EVERY DAY
GAD7 TOTAL SCORE: 20
7. FEELING AFRAID AS IF SOMETHING AWFUL MIGHT HAPPEN: NEARLY EVERY DAY

## 2025-03-18 ENCOUNTER — VIRTUAL VISIT (OUTPATIENT)
Dept: PSYCHOLOGY | Facility: OTHER | Age: 50
End: 2025-03-18
Payer: COMMERCIAL

## 2025-03-18 DIAGNOSIS — M54.2 CHRONIC NECK PAIN: ICD-10-CM

## 2025-03-18 DIAGNOSIS — T40.2X5A CONSTIPATION DUE TO OPIOID THERAPY: ICD-10-CM

## 2025-03-18 DIAGNOSIS — F41.1 GENERALIZED ANXIETY DISORDER: ICD-10-CM

## 2025-03-18 DIAGNOSIS — F33.1 MODERATE EPISODE OF RECURRENT MAJOR DEPRESSIVE DISORDER (H): Primary | ICD-10-CM

## 2025-03-18 DIAGNOSIS — K59.03 CONSTIPATION DUE TO OPIOID THERAPY: ICD-10-CM

## 2025-03-18 DIAGNOSIS — G25.81 RESTLESS LEGS SYNDROME (RLS): ICD-10-CM

## 2025-03-18 DIAGNOSIS — G89.29 CHRONIC NECK PAIN: ICD-10-CM

## 2025-03-18 PROCEDURE — 90834 PSYTX W PT 45 MINUTES: CPT | Mod: 95 | Performed by: COUNSELOR

## 2025-03-18 NOTE — TELEPHONE ENCOUNTER
Pt requesting Cyclobenzaprine 5 mg script. No longer shows on med list . She states it works better for her . Please contact pt with  any questions      Felicitas Charles  Pharmacy Tech.  [facility]  [phone]

## 2025-03-18 NOTE — PROGRESS NOTES
Answers submitted by the patient for this visit:  Patient Health Questionnaire (Submitted on 3/17/2025)  If you checked off any problems, how difficult have these problems made it for you to do your work, take care of things at home, or get along with other people?: Extremely difficult  PHQ9 TOTAL SCORE: 23  Patient Health Questionnaire (G7) (Submitted on 3/17/2025)  BO 7 TOTAL SCORE: 20            [facility]                                     Progress Note    Patient Name: Radha Beckwith  Date: 3/18/25         Service Type: Individual      Session Start Time: 3:00pm Session End Time: 3:50pm     Session Length:  50    Session #: 73    Attendees: Client    Service Modality:  video      Provider verified identity through the following two step process.  Patient provided:  Patient is known previously to provider    Telemedicine Visit: The patient's condition can be safely assessed and treated via synchronous audio and visual telemedicine encounter.      Reason for Telemedicine Visit: Patient convenience (e.g. access to timely appointments / distance to available provider)    Originating Site (Patient Location): Patient's home    Distant Site (Provider Location): Provider Remote Setting- Home Office    Consent:  The patient/guardian has verbally consented to: the potential risks and benefits of telemedicine (video visit) versus in person care; bill my insurance or make self-payment for services provided; and responsibility for payment of non-covered services.     Patient would like the video invitation sent by:  My Chart    Mode of Communication:  telephone- client did not have access to wi/fi to be able to do video session    Distant Location (Provider):  Off-site home office    As the provider I attest to compliance with applicable laws and regulations related to telemedicine.    There has been demonstrated improvement in functioning while patient has been engaged in  psychotherapy/psychological service- if withdrawn the patient would deteriorate and/or relapse.      DATA  Interactive Complexity: No  Crisis: No        Progress Since Last Session (Related to Symptoms / Goals / Homework):   Symptoms: No change .    Homework: Completed in session      Episode of Care Goals: Satisfactory progress - MAINTENANCE (Working to maintain change, with risk of relapse); Intervened by continuing to positively reinforce healthy behavior choice      Current / Ongoing Stressors and Concerns:   Stated she's been having a lot of jaw pain lately.  Was referred to get an MRI for it.   Hasn't felt like eating, no energy to shower to change her clothes, has been so tired too. Stated her vision has been different lately, has had the taste of salt in her mouth, ear aches, neck aches.        Treatment Objective(s) Addressed in This Session:   Increase interest, engagement, and pleasure in doing things  Feel less tired and more energy during the day        Intervention:   Talked through the client's current physical symptoms and how they have been impacting her quality of life and mood. Stated she went about 4 days without showering or changing because of how bad she felt. Encouraged client to contact her pcp about her symptoms and continue to work on self care.     Assessments completed prior to visit:  The following assessments were completed by patient for this visit:  PHQ9:       2/7/2025    12:36 PM 2/17/2025    12:20 PM 2/24/2025    12:02 PM 2/25/2025     6:49 PM 3/3/2025     9:02 AM 3/10/2025     2:30 PM 3/17/2025    11:36 AM   PHQ-9 SCORE   PHQ-9 Total Score MyChart  23 (Severe depression) 24 (Severe depression) 24 (Severe depression) 24 (Severe depression) 22 (Severe depression) 23 (Severe depression)   PHQ-9 Total Score 0 23  24  24  24  22  23        Patient-reported     GAD7:       11/10/2024     7:03 PM 12/1/2024     4:42 PM 12/16/2024     1:05 PM 1/6/2025     3:30 PM 2/5/2025     7:35 PM  2/24/2025    12:03 PM 3/17/2025    11:37 AM   BO-7 SCORE   Total Score 19 (severe anxiety) 20 (severe anxiety) 18 (severe anxiety) 21 (severe anxiety) 20 (severe anxiety) 20 (severe anxiety) 20 (severe anxiety)   Total Score 19  20  18  21  20  20  20        Patient-reported     PROMIS 10-Global Health (all questions and answers displayed):       8/10/2024     2:51 PM 8/18/2024    11:36 AM 8/28/2024     2:07 PM 12/1/2024     4:43 PM 12/16/2024     1:07 PM 1/6/2025     3:31 PM 2/24/2025    12:00 PM   PROMIS 10   In general, would you say your health is: Fair Poor Poor Poor Poor Poor Poor   In general, would you say your quality of life is: Poor Fair Fair Poor Poor Poor Poor   In general, how would you rate your physical health? Poor Poor Fair Poor Poor Fair Poor   In general, how would you rate your mental health, including your mood and your ability to think? Poor Poor Poor Poor Poor Poor Poor   In general, how would you rate your satisfaction with your social activities and relationships? Poor Fair Fair Poor Fair Fair Poor   In general, please rate how well you carry out your usual social activities and roles Poor Fair Fair Poor Poor Poor Poor   To what extent are you able to carry out your everyday physical activities such as walking, climbing stairs, carrying groceries, or moving a chair? A little A little A little A little A little A little A little   In the past 7 days, how often have you been bothered by emotional problems such as feeling anxious, depressed, or irritable? Always Always Always Always Always Always Always   In the past 7 days, how would you rate your fatigue on average? Very severe Very severe Severe Severe Very severe Very severe Severe   In the past 7 days, how would you rate your pain on average, where 0 means no pain, and 10 means worst imaginable pain? 7 7 7 8 7 8 7   In general, would you say your health is: 2 1 1 1 1 1 1   In general, would you say your quality of life is: 1 2 2 1 1 1 1    In general, how would you rate your physical health? 1 1 2 1 1 2 1   In general, how would you rate your mental health, including your mood and your ability to think? 1 1 1 1 1 1 1   In general, how would you rate your satisfaction with your social activities and relationships? 1 2 2 1 2 2 1   In general, please rate how well you carry out your usual social activities and roles. (This includes activities at home, at work and in your community, and responsibilities as a parent, child, spouse, employee, friend, etc.) 1 2 2 1 1 1 1   To what extent are you able to carry out your everyday physical activities such as walking, climbing stairs, carrying groceries, or moving a chair? 2 2 2 2 2 2 2   In the past 7 days, how often have you been bothered by emotional problems such as feeling anxious, depressed, or irritable? 5 5 5 5 5 5 5   In the past 7 days, how would you rate your fatigue on average? 5 5 4 4 5 5 4   In the past 7 days, how would you rate your pain on average, where 0 means no pain, and 10 means worst imaginable pain? 7 7 7 8 7 8 7   Global Mental Health Score 4    4 6 6 4  5  5  4    Global Physical Health Score 6    6 6 8 7  6  7  7    PROMIS TOTAL - SUBSCORES 10    10 12 14 11  11  12  11        Patient-reported     [place] Suicide Severity Rating Scale (Lifetime/Recent)      8/31/2024    12:07 PM 9/5/2024     5:33 PM 9/13/2024    10:52 AM 10/4/2024     4:54 PM 11/22/2024    11:34 AM 12/25/2024    11:30 PM 1/28/2025     1:23 PM   [place] Suicide Severity Rating (Lifetime/Recent)   Q1 Wished to be Dead (Past Month) 0-->no 0-->no 0-->no 0-->no 0-->no 0-->no 0-->no   Q2 Suicidal Thoughts (Past Month) 0-->no 0-->no 0-->no 0-->no 0-->no 0-->no 0-->no   Q6 Suicide Behavior (Lifetime) 0-->no 1-->yes 0-->no 0-->no 0-->no 0-->no 0-->no   If yes to Q6, within past 3 months?  0-->no        Level of Risk per Screen no risks indicated moderate risk no risks indicated no risks indicated no risks indicated no risks  36.8 indicated no risks indicated         ASSESSMENT: Current Emotional / Mental Status (status of significant symptoms):   Risk status (Self / Other harm or suicidal ideation)   Patient denies current fears or concerns for personal safety.   Patient denies current or recent suicidal ideation or behaviors.   Patient denies current or recent homicidal ideation or behaviors.   Patient denies current or recent self injurious behavior or ideation.   Patient denies other safety concerns.   Patient reports there has been no change in risk factors since their last session.     Patient reports there has been no change in protective factors since their last session.     Recommended that patient call 911 or go to the local ED should there be a change in any of these risk factors.     Appearance:   Appropriate    Eye Contact:   Good    Psychomotor Behavior: Normal    Attitude:   Cooperative    Orientation:   All   Speech    Rate / Production: Normal/ Responsive Normal     Volume:  Normal    Mood:    Normal   Affect:    Appropriate    Thought Content:  Clear    Thought Form:  Coherent    Insight:    Good      Medication Review:   No changes to current psychiatric medication(s)     Medication Compliance:   Yes     Changes in Health Issues:   None reported     Chemical Use Review:   Substance Use: Chemical use reviewed, no active concerns identified      Tobacco Use: No change in amount of tobacco use since last session.  Patient declined discussion at this time    Diagnosis:  1. Moderate episode of recurrent major depressive disorder (H)    2. Generalized anxiety disorder            Collateral Reports Completed:   Not Applicable    PLAN: (Patient Tasks / Therapist Tasks / Other)  Continue to use stress reduction skills daily and self care.            ERIK Keller                                                         ______________________________________________________________________    Individual Treatment  Plan    Patient's Name: Radha Beckwith  YOB: 1975    Date of Creation: 6/28/23  Date Treatment Plan Last Reviewed/Revised: 2/25/25    DSM5 Diagnoses: 296.32 (F33.1) Major Depressive Disorder, Recurrent Episode, Moderate _  Psychosocial / Contextual Factors: living with boyfriend, memory issues  PROMIS (reviewed every 90 days):     Referral / Collaboration:  Referral to another professional/service is not indicated at this time..    Anticipated number of session for this episode of care: 9-12 sessions  Anticipation frequency of session:  as needed  Anticipated Duration of each session: 38-52 minutes  Treatment plan will be reviewed in 90 days or when goals have been changed.       MeasurableTreatment Goal(s) related to diagnosis / functional impairment(s)  Goal 1: Patient will increase communication skills with family members.    Objective #A (Patient Action)    Patient will learn & utilize at least 2 assertive communication skills weekly.  Status: Continued - Date(s): 2/25/25    Intervention(s)  Therapist will teach emotional regulation skills. distress tolerance skills, interpersonal effectiveness skills, emotion regluation skills, mindfulness skills, radical acceptance. Therapist will teach client how to ID body cues for anxiety, anxiety reduction techniques, how to ID triggers for depression and anxiety- decrease reactivity/eliminate, lifestyle changes to reduce depression and anxiety, communication skills, explore cognitive beliefs and help client to develop healthy cognitive patterns and beliefs.    Objective #B  Patient will use thought-stopping strategy daily to reduce intrusive thoughts.  Status: Continued - Date(s): 2/25/25    Intervention(s)  Therapist will teach emotional regulation skills. distress tolerance skills, interpersonal effectiveness skills, emotion regluation skills, mindfulness skills, radical acceptance. Therapist will teach client how to ID body cues for anxiety,  anxiety reduction techniques, how to ID triggers for depression and anxiety- decrease reactivity/eliminate, lifestyle changes to reduce depression and anxiety, communication skills, explore cognitive beliefs and help client to develop healthy cognitive patterns and beliefs.      Goal 2: Patient will decrease depression symptoms.      Objective #A (Patient Action)    Status: Continued - Date(s): 2/25/25    Patient will Increase interest, engagement, and pleasure in doing things  Decrease frequency and intensity of feeling down, depressed, hopeless  Improve quantity and quality of night time sleep / decrease daytime naps  Feel less tired and more energy during the day   Improve diet, appetite, mindful eating, and / or meal planning  Identify negative self-talk and behaviors: challenge core beliefs, myths, and actions  Improve concentration, focus, and mindfulness in daily activities   Feel less fidgety, restless or slow in daily activities / interpersonal interactions.    Intervention(s)  Therapist will teach emotional regulation skills. distress tolerance skills, interpersonal effectiveness skills, emotion regluation skills, mindfulness skills, radical acceptance. Therapist will teach client how to ID body cues for anxiety, anxiety reduction techniques, how to ID triggers for depression and anxiety- decrease reactivity/eliminate, lifestyle changes to reduce depression and anxiety, communication skills, explore cognitive beliefs and help client to develop healthy cognitive patterns and beliefs.    Objective #B  Patient will identify three distraction and diversion activities and use those activities to decrease level of anxiety  .    Status: Continued - Date(s):  2/25/25    Intervention(s)  Therapist will teach emotional regulation skills. distress tolerance skills, interpersonal effectiveness skills, emotion regluation skills, mindfulness skills, radical acceptance. Therapist will teach client how to ID body cues for  anxiety, anxiety reduction techniques, how to ID triggers for depression and anxiety- decrease reactivity/eliminate, lifestyle changes to reduce depression and anxiety, communication skills, explore cognitive beliefs and help client to develop healthy cognitive patterns and beliefs.      Patient has reviewed and agreed to the above plan.      Ricarda Bhatia [facility]

## 2025-03-19 RX ORDER — LACTULOSE 10 G/15ML
10 SOLUTION ORAL 3 TIMES DAILY PRN
Qty: 300 ML | Refills: 3 | Status: SHIPPED | OUTPATIENT
Start: 2025-03-19

## 2025-03-19 RX ORDER — CYCLOBENZAPRINE HCL 5 MG
5-10 TABLET ORAL 3 TIMES DAILY PRN
Qty: 90 TABLET | Refills: 5 | Status: SHIPPED | OUTPATIENT
Start: 2025-03-19

## 2025-03-19 RX ORDER — ROPINIROLE 1 MG/1
TABLET, FILM COATED ORAL
Qty: 90 TABLET | Refills: 0 | Status: SHIPPED | OUTPATIENT
Start: 2025-03-19

## 2025-03-21 PROBLEM — F41.0 PANIC DISORDER: Status: ACTIVE | Noted: 2025-03-21

## 2025-03-21 PROBLEM — F43.10 PTSD (POST-TRAUMATIC STRESS DISORDER): Status: ACTIVE | Noted: 2025-03-21

## 2025-03-25 ENCOUNTER — VIRTUAL VISIT (OUTPATIENT)
Dept: PSYCHOLOGY | Facility: OTHER | Age: 50
End: 2025-03-25
Payer: COMMERCIAL

## 2025-03-25 ENCOUNTER — MYC MEDICAL ADVICE (OUTPATIENT)
Dept: PALLIATIVE MEDICINE | Facility: CLINIC | Age: 50
End: 2025-03-25
Payer: COMMERCIAL

## 2025-03-25 ENCOUNTER — MYC MEDICAL ADVICE (OUTPATIENT)
Dept: OTOLARYNGOLOGY | Facility: CLINIC | Age: 50
End: 2025-03-25
Payer: COMMERCIAL

## 2025-03-25 DIAGNOSIS — F33.1 MODERATE EPISODE OF RECURRENT MAJOR DEPRESSIVE DISORDER (H): Primary | ICD-10-CM

## 2025-03-25 DIAGNOSIS — F41.1 GENERALIZED ANXIETY DISORDER: ICD-10-CM

## 2025-03-25 DIAGNOSIS — M47.812 SPONDYLOSIS OF CERVICAL REGION WITHOUT MYELOPATHY OR RADICULOPATHY: Primary | ICD-10-CM

## 2025-03-25 PROCEDURE — 90834 PSYTX W PT 45 MINUTES: CPT | Mod: 95 | Performed by: COUNSELOR

## 2025-03-25 NOTE — PROGRESS NOTES
Answers submitted by the patient for this visit:  Patient Health Questionnaire (Submitted on 3/17/2025)  If you checked off any problems, how difficult have these problems made it for you to do your work, take care of things at home, or get along with other people?: Extremely difficult  PHQ9 TOTAL SCORE: 23  Patient Health Questionnaire (G7) (Submitted on 3/17/2025)  BO 7 TOTAL SCORE: 20            [facility]                                     Progress Note    Patient Name: Radha Beckwith  Date: 3/25/25         Service Type: Individual      Session Start Time: 3:00pm Session End Time: 3:50pm     Session Length:  50    Session #: 74    Attendees: Client    Service Modality:  video      Provider verified identity through the following two step process.  Patient provided:  Patient is known previously to provider    Telemedicine Visit: The patient's condition can be safely assessed and treated via synchronous audio and visual telemedicine encounter.      Reason for Telemedicine Visit: Patient convenience (e.g. access to timely appointments / distance to available provider)    Originating Site (Patient Location): Patient's home    Distant Site (Provider Location): Provider Remote Setting- Home Office    Consent:  The patient/guardian has verbally consented to: the potential risks and benefits of telemedicine (video visit) versus in person care; bill my insurance or make self-payment for services provided; and responsibility for payment of non-covered services.     Patient would like the video invitation sent by:  My Chart    Mode of Communication:  telephone- client did not have access to wi/fi to be able to do video session    Distant Location (Provider):  Off-site home office    As the provider I attest to compliance with applicable laws and regulations related to telemedicine.    There has been demonstrated improvement in functioning while patient has been engaged in  psychotherapy/psychological service- if withdrawn the patient would deteriorate and/or relapse.      DATA  Interactive Complexity: No  Crisis: No        Progress Since Last Session (Related to Symptoms / Goals / Homework):   Symptoms: No change .    Homework: Completed in session      Episode of Care Goals: Satisfactory progress - MAINTENANCE (Working to maintain change, with risk of relapse); Intervened by continuing to positively reinforce healthy behavior choice      Current / Ongoing Stressors and Concerns:   Stated she got to go to a new chiropractor and really likes her.   Feeling very worn out from being busy.      Treatment Objective(s) Addressed in This Session:   Increase interest, engagement, and pleasure in doing things  Feel less tired and more energy during the day        Intervention:   Continued to talk about the link between her physical health and mental health symptoms. Reviewed coping skills and self care skills.     Assessments completed prior to visit:  The following assessments were completed by patient for this visit:  PHQ9:       2/7/2025    12:36 PM 2/17/2025    12:20 PM 2/24/2025    12:02 PM 2/25/2025     6:49 PM 3/3/2025     9:02 AM 3/10/2025     2:30 PM 3/17/2025    11:36 AM   PHQ-9 SCORE   PHQ-9 Total Score MyChart  23 (Severe depression) 24 (Severe depression) 24 (Severe depression) 24 (Severe depression) 22 (Severe depression) 23 (Severe depression)   PHQ-9 Total Score 0 23  24  24  24  22  23        Patient-reported     GAD7:       11/10/2024     7:03 PM 12/1/2024     4:42 PM 12/16/2024     1:05 PM 1/6/2025     3:30 PM 2/5/2025     7:35 PM 2/24/2025    12:03 PM 3/17/2025    11:37 AM   BO-7 SCORE   Total Score 19 (severe anxiety) 20 (severe anxiety) 18 (severe anxiety) 21 (severe anxiety) 20 (severe anxiety) 20 (severe anxiety) 20 (severe anxiety)   Total Score 19  20  18  21  20  20  20        Patient-reported     PROMIS 10-Global Health (all questions and answers displayed):        8/10/2024     2:51 PM 8/18/2024    11:36 AM 8/28/2024     2:07 PM 12/1/2024     4:43 PM 12/16/2024     1:07 PM 1/6/2025     3:31 PM 2/24/2025    12:00 PM   PROMIS 10   In general, would you say your health is: Fair Poor Poor Poor Poor Poor Poor   In general, would you say your quality of life is: Poor Fair Fair Poor Poor Poor Poor   In general, how would you rate your physical health? Poor Poor Fair Poor Poor Fair Poor   In general, how would you rate your mental health, including your mood and your ability to think? Poor Poor Poor Poor Poor Poor Poor   In general, how would you rate your satisfaction with your social activities and relationships? Poor Fair Fair Poor Fair Fair Poor   In general, please rate how well you carry out your usual social activities and roles Poor Fair Fair Poor Poor Poor Poor   To what extent are you able to carry out your everyday physical activities such as walking, climbing stairs, carrying groceries, or moving a chair? A little A little A little A little A little A little A little   In the past 7 days, how often have you been bothered by emotional problems such as feeling anxious, depressed, or irritable? Always Always Always Always Always Always Always   In the past 7 days, how would you rate your fatigue on average? Very severe Very severe Severe Severe Very severe Very severe Severe   In the past 7 days, how would you rate your pain on average, where 0 means no pain, and 10 means worst imaginable pain? 7 7 7 8 7 8 7   In general, would you say your health is: 2 1 1 1 1 1 1   In general, would you say your quality of life is: 1 2 2 1 1 1 1   In general, how would you rate your physical health? 1 1 2 1 1 2 1   In general, how would you rate your mental health, including your mood and your ability to think? 1 1 1 1 1 1 1   In general, how would you rate your satisfaction with your social activities and relationships? 1 2 2 1 2 2 1   In general, please rate how well you carry out your  usual social activities and roles. (This includes activities at home, at work and in your community, and responsibilities as a parent, child, spouse, employee, friend, etc.) 1 2 2 1 1 1 1   To what extent are you able to carry out your everyday physical activities such as walking, climbing stairs, carrying groceries, or moving a chair? 2 2 2 2 2 2 2   In the past 7 days, how often have you been bothered by emotional problems such as feeling anxious, depressed, or irritable? 5 5 5 5 5 5 5   In the past 7 days, how would you rate your fatigue on average? 5 5 4 4 5 5 4   In the past 7 days, how would you rate your pain on average, where 0 means no pain, and 10 means worst imaginable pain? 7 7 7 8 7 8 7   Global Mental Health Score 4    4 6 6 4  5  5  4    Global Physical Health Score 6    6 6 8 7  6  7  7    PROMIS TOTAL - SUBSCORES 10    10 12 14 11  11  12  11        Patient-reported     [place] Suicide Severity Rating Scale (Lifetime/Recent)      8/31/2024    12:07 PM 9/5/2024     5:33 PM 9/13/2024    10:52 AM 10/4/2024     4:54 PM 11/22/2024    11:34 AM 12/25/2024    11:30 PM 1/28/2025     1:23 PM   [place] Suicide Severity Rating (Lifetime/Recent)   Q1 Wished to be Dead (Past Month) 0-->no 0-->no 0-->no 0-->no 0-->no 0-->no 0-->no   Q2 Suicidal Thoughts (Past Month) 0-->no 0-->no 0-->no 0-->no 0-->no 0-->no 0-->no   Q6 Suicide Behavior (Lifetime) 0-->no 1-->yes 0-->no 0-->no 0-->no 0-->no 0-->no   If yes to Q6, within past 3 months?  0-->no        Level of Risk per Screen no risks indicated moderate risk no risks indicated no risks indicated no risks indicated no risks indicated no risks indicated         ASSESSMENT: Current Emotional / Mental Status (status of significant symptoms):   Risk status (Self / Other harm or suicidal ideation)   Patient denies current fears or concerns for personal safety.   Patient denies current or recent suicidal ideation or behaviors.   Patient denies current or recent homicidal  ideation or behaviors.   Patient denies current or recent self injurious behavior or ideation.   Patient denies other safety concerns.   Patient reports there has been no change in risk factors since their last session.     Patient reports there has been no change in protective factors since their last session.     Recommended that patient call 911 or go to the local ED should there be a change in any of these risk factors.     Appearance:   Appropriate    Eye Contact:   Good    Psychomotor Behavior: Normal    Attitude:   Cooperative    Orientation:   All   Speech    Rate / Production: Normal/ Responsive Normal     Volume:  Normal    Mood:    Normal   Affect:    Appropriate    Thought Content:  Clear    Thought Form:  Coherent    Insight:    Good      Medication Review:   No changes to current psychiatric medication(s)     Medication Compliance:   Yes     Changes in Health Issues:   None reported     Chemical Use Review:   Substance Use: Chemical use reviewed, no active concerns identified      Tobacco Use: No change in amount of tobacco use since last session.  Patient declined discussion at this time    Diagnosis:  1. Moderate episode of recurrent major depressive disorder (H)    2. Generalized anxiety disorder            Collateral Reports Completed:   Not Applicable    PLAN: (Patient Tasks / Therapist Tasks / Other)  Continue to use stress reduction skills and self care.            Ricarda Bhatia [facility]                                                         ______________________________________________________________________    Individual Treatment Plan    Patient's Name: Radha Beckwith  YOB: 1975    Date of Creation: 6/28/23  Date Treatment Plan Last Reviewed/Revised: 2/25/25    DSM5 Diagnoses: 296.32 (F33.1) Major Depressive Disorder, Recurrent Episode, Moderate _  Psychosocial / Contextual Factors: living with boyfriend, memory issues  PROMIS (reviewed every 90 days):     Referral /  Collaboration:  Referral to another professional/service is not indicated at this time..    Anticipated number of session for this episode of care: 9-12 sessions  Anticipation frequency of session:  as needed  Anticipated Duration of each session: 38-52 minutes  Treatment plan will be reviewed in 90 days or when goals have been changed.       MeasurableTreatment Goal(s) related to diagnosis / functional impairment(s)  Goal 1: Patient will increase communication skills with family members.    Objective #A (Patient Action)    Patient will learn & utilize at least 2 assertive communication skills weekly.  Status: Continued - Date(s): 2/25/25    Intervention(s)  Therapist will teach emotional regulation skills. distress tolerance skills, interpersonal effectiveness skills, emotion regluation skills, mindfulness skills, radical acceptance. Therapist will teach client how to ID body cues for anxiety, anxiety reduction techniques, how to ID triggers for depression and anxiety- decrease reactivity/eliminate, lifestyle changes to reduce depression and anxiety, communication skills, explore cognitive beliefs and help client to develop healthy cognitive patterns and beliefs.    Objective #B  Patient will use thought-stopping strategy daily to reduce intrusive thoughts.  Status: Continued - Date(s): 2/25/25    Intervention(s)  Therapist will teach emotional regulation skills. distress tolerance skills, interpersonal effectiveness skills, emotion regluation skills, mindfulness skills, radical acceptance. Therapist will teach client how to ID body cues for anxiety, anxiety reduction techniques, how to ID triggers for depression and anxiety- decrease reactivity/eliminate, lifestyle changes to reduce depression and anxiety, communication skills, explore cognitive beliefs and help client to develop healthy cognitive patterns and beliefs.      Goal 2: Patient will decrease depression symptoms.      Objective #A (Patient  Action)    Status: Continued - Date(s): 2/25/25    Patient will Increase interest, engagement, and pleasure in doing things  Decrease frequency and intensity of feeling down, depressed, hopeless  Improve quantity and quality of night time sleep / decrease daytime naps  Feel less tired and more energy during the day   Improve diet, appetite, mindful eating, and / or meal planning  Identify negative self-talk and behaviors: challenge core beliefs, myths, and actions  Improve concentration, focus, and mindfulness in daily activities   Feel less fidgety, restless or slow in daily activities / interpersonal interactions.    Intervention(s)  Therapist will teach emotional regulation skills. distress tolerance skills, interpersonal effectiveness skills, emotion regluation skills, mindfulness skills, radical acceptance. Therapist will teach client how to ID body cues for anxiety, anxiety reduction techniques, how to ID triggers for depression and anxiety- decrease reactivity/eliminate, lifestyle changes to reduce depression and anxiety, communication skills, explore cognitive beliefs and help client to develop healthy cognitive patterns and beliefs.    Objective #B  Patient will identify three distraction and diversion activities and use those activities to decrease level of anxiety  .    Status: Continued - Date(s):  2/25/25    Intervention(s)  Therapist will teach emotional regulation skills. distress tolerance skills, interpersonal effectiveness skills, emotion regluation skills, mindfulness skills, radical acceptance. Therapist will teach client how to ID body cues for anxiety, anxiety reduction techniques, how to ID triggers for depression and anxiety- decrease reactivity/eliminate, lifestyle changes to reduce depression and anxiety, communication skills, explore cognitive beliefs and help client to develop healthy cognitive patterns and beliefs.      Patient has reviewed and agreed to the above plan.      Ricarda  English, [facility]

## 2025-03-26 PROBLEM — H92.03 OTALGIA, BILATERAL: Status: ACTIVE | Noted: 2025-03-26

## 2025-03-26 PROBLEM — H90.3 SENSORINEURAL HEARING LOSS, BILATERAL: Status: ACTIVE | Noted: 2025-03-26

## 2025-03-26 PROBLEM — H93.A3 PULSATILE TINNITUS OF BOTH EARS: Status: ACTIVE | Noted: 2025-03-26

## 2025-03-26 PROBLEM — M26.609 TMJ (TEMPOROMANDIBULAR JOINT SYNDROME): Status: ACTIVE | Noted: 2025-03-26

## 2025-03-26 NOTE — TELEPHONE ENCOUNTER
"Patient message re: post RFA symptoms:     \"Also, my neck and shoulders get shock like tingling that lasts for hours. (Feels like I have my Tens Unit on them.) Gets worse throughout the day.       Linette SRIVASTAVA Pain Nurse (supporting King Reynaga MD)Yesterday (11:15 AM)       Good morning Dr. Reynaga.  I'm wondering why the bottom of my neck and shoulders still feels like bad sunburn after my Cervical Ablation done on January 16th, 2025? Also, there is an extremely sensitive area to the right of the bottom of my neck. If I touch it, the pain is quite severe, if my clothing touches it, if I lay on it or if the shower hits it, it hurts.   I've tried capsasin, diflonac, aloe, etc and nothing is helping.  Please let me know if I need a follow-up appointment, or any other suggestions.  Kind regards,  Linette Beckwith\"      Routing to provider to review and advise.     Reema Gaffney RN    "

## 2025-03-31 ENCOUNTER — VIRTUAL VISIT (OUTPATIENT)
Dept: PSYCHOLOGY | Facility: CLINIC | Age: 50
End: 2025-03-31
Payer: COMMERCIAL

## 2025-03-31 DIAGNOSIS — F33.1 MODERATE EPISODE OF RECURRENT MAJOR DEPRESSIVE DISORDER (H): Primary | ICD-10-CM

## 2025-03-31 DIAGNOSIS — F43.10 PTSD (POST-TRAUMATIC STRESS DISORDER): ICD-10-CM

## 2025-03-31 DIAGNOSIS — F41.1 GENERALIZED ANXIETY DISORDER: ICD-10-CM

## 2025-03-31 PROCEDURE — 90837 PSYTX W PT 60 MINUTES: CPT | Mod: 95 | Performed by: COUNSELOR

## 2025-03-31 NOTE — PROGRESS NOTES
[facility]                                     Progress Note    Patient Name: Radha Beckwith  Date: 3/31/25         Service Type: Individual      Session Start Time: 1:32pm Session End Time: 2:30pm     Session Length:  58    Session #: 75    Attendees: Client    Service Modality:  video      Provider verified identity through the following two step process.  Patient provided:  Patient is known previously to provider    Telemedicine Visit: The patient's condition can be safely assessed and treated via synchronous audio and visual telemedicine encounter.      Reason for Telemedicine Visit: Patient convenience (e.g. access to timely appointments / distance to available provider)    Originating Site (Patient Location): Patient's home    Distant Site (Provider Location): Provider Remote Setting- Home Office    Consent:  The patient/guardian has verbally consented to: the potential risks and benefits of telemedicine (video visit) versus in person care; bill my insurance or make self-payment for services provided; and responsibility for payment of non-covered services.     Patient would like the video invitation sent by:  My Chart    Mode of Communication:  telephone- client did not have access to wi/fi to be able to do video session    Distant Location (Provider):  Off-site home office    As the provider I attest to compliance with applicable laws and regulations related to telemedicine.    There has been demonstrated improvement in functioning while patient has been engaged in psychotherapy/psychological service- if withdrawn the patient would deteriorate and/or relapse.      DATA  Interactive Complexity: No  Crisis: No        Progress Since Last Session (Related to Symptoms / Goals / Homework):   Symptoms: No change .    Homework: Completed in session      Episode of Care Goals: Satisfactory progress - MAINTENANCE (Working to maintain change, with risk of relapse); Intervened by continuing  to positively reinforce healthy behavior choice      Current / Ongoing Stressors and Concerns:   Stated she had to cancel her chiropractor appointment because her boyfriend still hasn't fixed the car.   Just started on Vraylar.        Treatment Objective(s) Addressed in This Session:   Increase interest, engagement, and pleasure in doing things  Feel less tired and more energy during the day        Intervention:   Worked on finding resources/coping skills the client can access to help bring a sense of peace. Client as been watching nature videos and listening to nature sounds which has provided that feeling. Discussed recent conflict with her boyfriend and worked with client on communication skills.     Assessments completed prior to visit:  The following assessments were completed by patient for this visit:  PHQ9:       2/7/2025    12:36 PM 2/17/2025    12:20 PM 2/24/2025    12:02 PM 2/25/2025     6:49 PM 3/3/2025     9:02 AM 3/10/2025     2:30 PM 3/17/2025    11:36 AM   PHQ-9 SCORE   PHQ-9 Total Score MyChart  23 (Severe depression) 24 (Severe depression) 24 (Severe depression) 24 (Severe depression) 22 (Severe depression) 23 (Severe depression)   PHQ-9 Total Score 0 23  24  24  24  22  23        Patient-reported     GAD7:       11/10/2024     7:03 PM 12/1/2024     4:42 PM 12/16/2024     1:05 PM 1/6/2025     3:30 PM 2/5/2025     7:35 PM 2/24/2025    12:03 PM 3/17/2025    11:37 AM   BO-7 SCORE   Total Score 19 (severe anxiety) 20 (severe anxiety) 18 (severe anxiety) 21 (severe anxiety) 20 (severe anxiety) 20 (severe anxiety) 20 (severe anxiety)   Total Score 19  20  18  21  20  20  20        Patient-reported     PROMIS 10-Global Health (all questions and answers displayed):       8/10/2024     2:51 PM 8/18/2024    11:36 AM 8/28/2024     2:07 PM 12/1/2024     4:43 PM 12/16/2024     1:07 PM 1/6/2025     3:31 PM 2/24/2025    12:00 PM   PROMIS 10   In general, would you say your health is: Fair Poor Poor Poor Poor  Poor Poor   In general, would you say your quality of life is: Poor Fair Fair Poor Poor Poor Poor   In general, how would you rate your physical health? Poor Poor Fair Poor Poor Fair Poor   In general, how would you rate your mental health, including your mood and your ability to think? Poor Poor Poor Poor Poor Poor Poor   In general, how would you rate your satisfaction with your social activities and relationships? Poor Fair Fair Poor Fair Fair Poor   In general, please rate how well you carry out your usual social activities and roles Poor Fair Fair Poor Poor Poor Poor   To what extent are you able to carry out your everyday physical activities such as walking, climbing stairs, carrying groceries, or moving a chair? A little A little A little A little A little A little A little   In the past 7 days, how often have you been bothered by emotional problems such as feeling anxious, depressed, or irritable? Always Always Always Always Always Always Always   In the past 7 days, how would you rate your fatigue on average? Very severe Very severe Severe Severe Very severe Very severe Severe   In the past 7 days, how would you rate your pain on average, where 0 means no pain, and 10 means worst imaginable pain? 7 7 7 8 7 8 7   In general, would you say your health is: 2 1 1 1 1 1 1   In general, would you say your quality of life is: 1 2 2 1 1 1 1   In general, how would you rate your physical health? 1 1 2 1 1 2 1   In general, how would you rate your mental health, including your mood and your ability to think? 1 1 1 1 1 1 1   In general, how would you rate your satisfaction with your social activities and relationships? 1 2 2 1 2 2 1   In general, please rate how well you carry out your usual social activities and roles. (This includes activities at home, at work and in your community, and responsibilities as a parent, child, spouse, employee, friend, etc.) 1 2 2 1 1 1 1   To what extent are you able to carry out your  everyday physical activities such as walking, climbing stairs, carrying groceries, or moving a chair? 2 2 2 2 2 2 2   In the past 7 days, how often have you been bothered by emotional problems such as feeling anxious, depressed, or irritable? 5 5 5 5 5 5 5   In the past 7 days, how would you rate your fatigue on average? 5 5 4 4 5 5 4   In the past 7 days, how would you rate your pain on average, where 0 means no pain, and 10 means worst imaginable pain? 7 7 7 8 7 8 7   Global Mental Health Score 4    4 6 6 4  5  5  4    Global Physical Health Score 6    6 6 8 7  6  7  7    PROMIS TOTAL - SUBSCORES 10    10 12 14 11  11  12  11        Patient-reported     [place] Suicide Severity Rating Scale (Lifetime/Recent)      8/31/2024    12:07 PM 9/5/2024     5:33 PM 9/13/2024    10:52 AM 10/4/2024     4:54 PM 11/22/2024    11:34 AM 12/25/2024    11:30 PM 1/28/2025     1:23 PM   [place] Suicide Severity Rating (Lifetime/Recent)   Q1 Wished to be Dead (Past Month) 0-->no 0-->no 0-->no 0-->no 0-->no 0-->no 0-->no   Q2 Suicidal Thoughts (Past Month) 0-->no 0-->no 0-->no 0-->no 0-->no 0-->no 0-->no   Q6 Suicide Behavior (Lifetime) 0-->no 1-->yes 0-->no 0-->no 0-->no 0-->no 0-->no   If yes to Q6, within past 3 months?  0-->no        Level of Risk per Screen no risks indicated moderate risk no risks indicated no risks indicated no risks indicated no risks indicated no risks indicated         ASSESSMENT: Current Emotional / Mental Status (status of significant symptoms):   Risk status (Self / Other harm or suicidal ideation)   Patient denies current fears or concerns for personal safety.   Patient denies current or recent suicidal ideation or behaviors.   Patient denies current or recent homicidal ideation or behaviors.   Patient denies current or recent self injurious behavior or ideation.   Patient denies other safety concerns.   Patient reports there has been no change in risk factors since their last session.     Patient  reports there has been no change in protective factors since their last session.     Recommended that patient call 911 or go to the local ED should there be a change in any of these risk factors.     Appearance:   Appropriate    Eye Contact:   Good    Psychomotor Behavior: Normal    Attitude:   Cooperative    Orientation:   All   Speech    Rate / Production: Normal/ Responsive Normal     Volume:  Normal    Mood:    Normal   Affect:    Appropriate    Thought Content:  Clear    Thought Form:  Coherent    Insight:    Good      Medication Review:   No changes to current psychiatric medication(s)     Medication Compliance:   Yes     Changes in Health Issues:   None reported     Chemical Use Review:   Substance Use: Chemical use reviewed, no active concerns identified      Tobacco Use: No change in amount of tobacco use since last session.  Patient declined discussion at this time    Diagnosis:  1. Moderate episode of recurrent major depressive disorder (H)    2. Generalized anxiety disorder    3. PTSD (post-traumatic stress disorder)            Collateral Reports Completed:   Not Applicable    PLAN: (Patient Tasks / Therapist Tasks / Other)  Continue to use stress reduction skills and self care-finding moments of peace.             Ricarda Bhatia, [facility]                                                         ______________________________________________________________________    Individual Treatment Plan    Patient's Name: Radha Beckwith  YOB: 1975    Date of Creation: 6/28/23  Date Treatment Plan Last Reviewed/Revised: 2/25/25    DSM5 Diagnoses: 296.32 (F33.1) Major Depressive Disorder, Recurrent Episode, Moderate _  Psychosocial / Contextual Factors: living with boyfriend, memory issues  PROMIS (reviewed every 90 days):     Referral / Collaboration:  Referral to another professional/service is not indicated at this time..    Anticipated number of session for this episode of care: 9-12  sessions  Anticipation frequency of session:  as needed  Anticipated Duration of each session: 38-52 minutes  Treatment plan will be reviewed in 90 days or when goals have been changed.       MeasurableTreatment Goal(s) related to diagnosis / functional impairment(s)  Goal 1: Patient will increase communication skills with family members.    Objective #A (Patient Action)    Patient will learn & utilize at least 2 assertive communication skills weekly.  Status: Continued - Date(s): 2/25/25    Intervention(s)  Therapist will teach emotional regulation skills. distress tolerance skills, interpersonal effectiveness skills, emotion regluation skills, mindfulness skills, radical acceptance. Therapist will teach client how to ID body cues for anxiety, anxiety reduction techniques, how to ID triggers for depression and anxiety- decrease reactivity/eliminate, lifestyle changes to reduce depression and anxiety, communication skills, explore cognitive beliefs and help client to develop healthy cognitive patterns and beliefs.    Objective #B  Patient will use thought-stopping strategy daily to reduce intrusive thoughts.  Status: Continued - Date(s): 2/25/25    Intervention(s)  Therapist will teach emotional regulation skills. distress tolerance skills, interpersonal effectiveness skills, emotion regluation skills, mindfulness skills, radical acceptance. Therapist will teach client how to ID body cues for anxiety, anxiety reduction techniques, how to ID triggers for depression and anxiety- decrease reactivity/eliminate, lifestyle changes to reduce depression and anxiety, communication skills, explore cognitive beliefs and help client to develop healthy cognitive patterns and beliefs.      Goal 2: Patient will decrease depression symptoms.      Objective #A (Patient Action)    Status: Continued - Date(s): 2/25/25    Patient will Increase interest, engagement, and pleasure in doing things  Decrease frequency and intensity of  feeling down, depressed, hopeless  Improve quantity and quality of night time sleep / decrease daytime naps  Feel less tired and more energy during the day   Improve diet, appetite, mindful eating, and / or meal planning  Identify negative self-talk and behaviors: challenge core beliefs, myths, and actions  Improve concentration, focus, and mindfulness in daily activities   Feel less fidgety, restless or slow in daily activities / interpersonal interactions.    Intervention(s)  Therapist will teach emotional regulation skills. distress tolerance skills, interpersonal effectiveness skills, emotion regluation skills, mindfulness skills, radical acceptance. Therapist will teach client how to ID body cues for anxiety, anxiety reduction techniques, how to ID triggers for depression and anxiety- decrease reactivity/eliminate, lifestyle changes to reduce depression and anxiety, communication skills, explore cognitive beliefs and help client to develop healthy cognitive patterns and beliefs.    Objective #B  Patient will identify three distraction and diversion activities and use those activities to decrease level of anxiety  .    Status: Continued - Date(s):  2/25/25    Intervention(s)  Therapist will teach emotional regulation skills. distress tolerance skills, interpersonal effectiveness skills, emotion regluation skills, mindfulness skills, radical acceptance. Therapist will teach client how to ID body cues for anxiety, anxiety reduction techniques, how to ID triggers for depression and anxiety- decrease reactivity/eliminate, lifestyle changes to reduce depression and anxiety, communication skills, explore cognitive beliefs and help client to develop healthy cognitive patterns and beliefs.      Patient has reviewed and agreed to the above plan.      Ricarda Bhatia [facility]
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
## 2025-04-04 NOTE — TELEPHONE ENCOUNTER
Patient notes that symptoms are ongoing and pain in neck is worsening at this time.     Reema Gaffney RN

## 2025-04-06 RX ORDER — METHYLPREDNISOLONE 4 MG/1
TABLET ORAL
Qty: 21 TABLET | Refills: 0 | Status: SHIPPED | OUTPATIENT
Start: 2025-04-06

## 2025-04-07 ENCOUNTER — VIRTUAL VISIT (OUTPATIENT)
Dept: PSYCHOLOGY | Facility: CLINIC | Age: 50
End: 2025-04-07
Payer: COMMERCIAL

## 2025-04-07 ENCOUNTER — DOCUMENTATION ONLY (OUTPATIENT)
Dept: PSYCHOLOGY | Facility: CLINIC | Age: 50
End: 2025-04-07
Payer: COMMERCIAL

## 2025-04-07 DIAGNOSIS — F43.10 PTSD (POST-TRAUMATIC STRESS DISORDER): ICD-10-CM

## 2025-04-07 DIAGNOSIS — F33.2 SEVERE EPISODE OF RECURRENT MAJOR DEPRESSIVE DISORDER, WITHOUT PSYCHOTIC FEATURES (H): ICD-10-CM

## 2025-04-07 DIAGNOSIS — F41.1 GENERALIZED ANXIETY DISORDER: ICD-10-CM

## 2025-04-07 DIAGNOSIS — F43.10 PTSD (POST-TRAUMATIC STRESS DISORDER): Primary | ICD-10-CM

## 2025-04-07 DIAGNOSIS — F41.0 PANIC DISORDER: ICD-10-CM

## 2025-04-07 DIAGNOSIS — F33.1 MODERATE EPISODE OF RECURRENT MAJOR DEPRESSIVE DISORDER (H): Primary | ICD-10-CM

## 2025-04-07 DIAGNOSIS — F41.1 GENERALIZED ANXIETY DISORDER: Primary | ICD-10-CM

## 2025-04-07 PROCEDURE — 96130 PSYCL TST EVAL PHYS/QHP 1ST: CPT | Mod: 95 | Performed by: PSYCHOLOGIST

## 2025-04-07 PROCEDURE — 96131 PSYCL TST EVAL PHYS/QHP EA: CPT | Mod: 95 | Performed by: PSYCHOLOGIST

## 2025-04-07 PROCEDURE — 90837 PSYTX W PT 60 MINUTES: CPT | Mod: 95 | Performed by: COUNSELOR

## 2025-04-07 NOTE — PROGRESS NOTES
[facility]                                     Progress Note    Patient Name: Radha Beckwith  Date: 4/7/25         Service Type: Individual      Session Start Time: 2:32pm Session End Time: 3:25pm     Session Length:  53    Session #: 76    Attendees: Client    Service Modality:  video      Provider verified identity through the following two step process.  Patient provided:  Patient is known previously to provider    Telemedicine Visit: The patient's condition can be safely assessed and treated via synchronous audio and visual telemedicine encounter.      Reason for Telemedicine Visit: Patient convenience (e.g. access to timely appointments / distance to available provider)    Originating Site (Patient Location): Patient's home    Distant Site (Provider Location): Provider Remote Setting- Home Office    Consent:  The patient/guardian has verbally consented to: the potential risks and benefits of telemedicine (video visit) versus in person care; bill my insurance or make self-payment for services provided; and responsibility for payment of non-covered services.     Patient would like the video invitation sent by:  My Chart    Mode of Communication:  telephone- client did not have access to wi/fi to be able to do video session    Distant Location (Provider):  Off-site home office    As the provider I attest to compliance with applicable laws and regulations related to telemedicine.    There has been demonstrated improvement in functioning while patient has been engaged in psychotherapy/psychological service- if withdrawn the patient would deteriorate and/or relapse.      DATA  Interactive Complexity: No  Crisis: No        Progress Since Last Session (Related to Symptoms / Goals / Homework):   Symptoms: No change .    Homework: Completed in session      Episode of Care Goals: Satisfactory progress - MAINTENANCE (Working to maintain change, with risk of relapse); Intervened by continuing  to positively reinforce healthy behavior choice      Current / Ongoing Stressors and Concerns:   Stated she got her results back from the ADHD testing. Yanna said that she has a lot of symptoms but can't confirm a diagnosis because of everything else she has going on physically and mentally. Too much overlap.         Treatment Objective(s) Addressed in This Session:   Increase interest, engagement, and pleasure in doing things  Feel less tired and more energy during the day        Intervention:   Talked through some of the results from the testing and educated her about some of the sections she had questions about. Encouraged her to check in with the yanna if she has more questions. Talked about more anxiety reduction skills and identifying potential triggers.       Assessments completed prior to visit:  The following assessments were completed by patient for this visit:  PHQ9:       2/7/2025    12:36 PM 2/17/2025    12:20 PM 2/24/2025    12:02 PM 2/25/2025     6:49 PM 3/3/2025     9:02 AM 3/10/2025     2:30 PM 3/17/2025    11:36 AM   PHQ-9 SCORE   PHQ-9 Total Score MyChart  23 (Severe depression) 24 (Severe depression) 24 (Severe depression) 24 (Severe depression) 22 (Severe depression) 23 (Severe depression)   PHQ-9 Total Score 0 23  24  24  24  22  23        Patient-reported     GAD7:       11/10/2024     7:03 PM 12/1/2024     4:42 PM 12/16/2024     1:05 PM 1/6/2025     3:30 PM 2/5/2025     7:35 PM 2/24/2025    12:03 PM 3/17/2025    11:37 AM   BO-7 SCORE   Total Score 19 (severe anxiety) 20 (severe anxiety) 18 (severe anxiety) 21 (severe anxiety) 20 (severe anxiety) 20 (severe anxiety) 20 (severe anxiety)   Total Score 19  20  18  21  20  20  20        Patient-reported     PROMIS 10-Global Health (all questions and answers displayed):       8/10/2024     2:51 PM 8/18/2024    11:36 AM 8/28/2024     2:07 PM 12/1/2024     4:43 PM 12/16/2024     1:07 PM 1/6/2025     3:31 PM 2/24/2025    12:00 PM   PROMIS 10   In  general, would you say your health is: Fair Poor Poor Poor Poor Poor Poor   In general, would you say your quality of life is: Poor Fair Fair Poor Poor Poor Poor   In general, how would you rate your physical health? Poor Poor Fair Poor Poor Fair Poor   In general, how would you rate your mental health, including your mood and your ability to think? Poor Poor Poor Poor Poor Poor Poor   In general, how would you rate your satisfaction with your social activities and relationships? Poor Fair Fair Poor Fair Fair Poor   In general, please rate how well you carry out your usual social activities and roles Poor Fair Fair Poor Poor Poor Poor   To what extent are you able to carry out your everyday physical activities such as walking, climbing stairs, carrying groceries, or moving a chair? A little A little A little A little A little A little A little   In the past 7 days, how often have you been bothered by emotional problems such as feeling anxious, depressed, or irritable? Always Always Always Always Always Always Always   In the past 7 days, how would you rate your fatigue on average? Very severe Very severe Severe Severe Very severe Very severe Severe   In the past 7 days, how would you rate your pain on average, where 0 means no pain, and 10 means worst imaginable pain? 7 7 7 8 7 8 7   In general, would you say your health is: 2 1 1 1 1 1 1   In general, would you say your quality of life is: 1 2 2 1 1 1 1   In general, how would you rate your physical health? 1 1 2 1 1 2 1   In general, how would you rate your mental health, including your mood and your ability to think? 1 1 1 1 1 1 1   In general, how would you rate your satisfaction with your social activities and relationships? 1 2 2 1 2 2 1   In general, please rate how well you carry out your usual social activities and roles. (This includes activities at home, at work and in your community, and responsibilities as a parent, child, spouse, employee, friend,  etc.) 1 2 2 1 1 1 1   To what extent are you able to carry out your everyday physical activities such as walking, climbing stairs, carrying groceries, or moving a chair? 2 2 2 2 2 2 2   In the past 7 days, how often have you been bothered by emotional problems such as feeling anxious, depressed, or irritable? 5 5 5 5 5 5 5   In the past 7 days, how would you rate your fatigue on average? 5 5 4 4 5 5 4   In the past 7 days, how would you rate your pain on average, where 0 means no pain, and 10 means worst imaginable pain? 7 7 7 8 7 8 7   Global Mental Health Score 4    4 6 6 4  5  5  4    Global Physical Health Score 6    6 6 8 7  6  7  7    PROMIS TOTAL - SUBSCORES 10    10 12 14 11  11  12  11        Patient-reported     [place] Suicide Severity Rating Scale (Lifetime/Recent)      8/31/2024    12:07 PM 9/5/2024     5:33 PM 9/13/2024    10:52 AM 10/4/2024     4:54 PM 11/22/2024    11:34 AM 12/25/2024    11:30 PM 1/28/2025     1:23 PM   [place] Suicide Severity Rating (Lifetime/Recent)   Q1 Wished to be Dead (Past Month) 0-->no 0-->no 0-->no 0-->no 0-->no 0-->no 0-->no   Q2 Suicidal Thoughts (Past Month) 0-->no 0-->no 0-->no 0-->no 0-->no 0-->no 0-->no   Q6 Suicide Behavior (Lifetime) 0-->no 1-->yes 0-->no 0-->no 0-->no 0-->no 0-->no   If yes to Q6, within past 3 months?  0-->no        Level of Risk per Screen no risks indicated moderate risk no risks indicated no risks indicated no risks indicated no risks indicated no risks indicated         ASSESSMENT: Current Emotional / Mental Status (status of significant symptoms):   Risk status (Self / Other harm or suicidal ideation)   Patient denies current fears or concerns for personal safety.   Patient denies current or recent suicidal ideation or behaviors.   Patient denies current or recent homicidal ideation or behaviors.   Patient denies current or recent self injurious behavior or ideation.   Patient denies other safety concerns.   Patient reports there has been  no change in risk factors since their last session.     Patient reports there has been no change in protective factors since their last session.     Recommended that patient call 911 or go to the local ED should there be a change in any of these risk factors.     Appearance:   Appropriate    Eye Contact:   Good    Psychomotor Behavior: Normal    Attitude:   Cooperative    Orientation:   All   Speech    Rate / Production: Normal/ Responsive Normal     Volume:  Normal    Mood:    Normal   Affect:    Appropriate    Thought Content:  Clear    Thought Form:  Coherent    Insight:    Good      Medication Review:   No changes to current psychiatric medication(s)     Medication Compliance:   Yes     Changes in Health Issues:   None reported     Chemical Use Review:   Substance Use: Chemical use reviewed, no active concerns identified      Tobacco Use: No change in amount of tobacco use since last session.  Patient declined discussion at this time    Diagnosis:  1. Moderate episode of recurrent major depressive disorder (H)    2. Generalized anxiety disorder    3. PTSD (post-traumatic stress disorder)            Collateral Reports Completed:   Not Applicable    PLAN: (Patient Tasks / Therapist Tasks / Other)  Practice anxiety reduction skills daily.             Ricarda Bhatia, [facility]                                                         ______________________________________________________________________    Individual Treatment Plan    Patient's Name: Radha Beckwith  YOB: 1975    Date of Creation: 6/28/23  Date Treatment Plan Last Reviewed/Revised: 2/25/25    DSM5 Diagnoses: 296.32 (F33.1) Major Depressive Disorder, Recurrent Episode, Moderate _  Psychosocial / Contextual Factors: living with boyfriend, memory issues  PROMIS (reviewed every 90 days):     Referral / Collaboration:  Referral to another professional/service is not indicated at this time..    Anticipated number of session for this episode  of care: 9-12 sessions  Anticipation frequency of session:  as needed  Anticipated Duration of each session: 38-52 minutes  Treatment plan will be reviewed in 90 days or when goals have been changed.       MeasurableTreatment Goal(s) related to diagnosis / functional impairment(s)  Goal 1: Patient will increase communication skills with family members.    Objective #A (Patient Action)    Patient will learn & utilize at least 2 assertive communication skills weekly.  Status: Continued - Date(s): 2/25/25    Intervention(s)  Therapist will teach emotional regulation skills. distress tolerance skills, interpersonal effectiveness skills, emotion regluation skills, mindfulness skills, radical acceptance. Therapist will teach client how to ID body cues for anxiety, anxiety reduction techniques, how to ID triggers for depression and anxiety- decrease reactivity/eliminate, lifestyle changes to reduce depression and anxiety, communication skills, explore cognitive beliefs and help client to develop healthy cognitive patterns and beliefs.    Objective #B  Patient will use thought-stopping strategy daily to reduce intrusive thoughts.  Status: Continued - Date(s): 2/25/25    Intervention(s)  Therapist will teach emotional regulation skills. distress tolerance skills, interpersonal effectiveness skills, emotion regluation skills, mindfulness skills, radical acceptance. Therapist will teach client how to ID body cues for anxiety, anxiety reduction techniques, how to ID triggers for depression and anxiety- decrease reactivity/eliminate, lifestyle changes to reduce depression and anxiety, communication skills, explore cognitive beliefs and help client to develop healthy cognitive patterns and beliefs.      Goal 2: Patient will decrease depression symptoms.      Objective #A (Patient Action)    Status: Continued - Date(s): 2/25/25    Patient will Increase interest, engagement, and pleasure in doing things  Decrease frequency and  intensity of feeling down, depressed, hopeless  Improve quantity and quality of night time sleep / decrease daytime naps  Feel less tired and more energy during the day   Improve diet, appetite, mindful eating, and / or meal planning  Identify negative self-talk and behaviors: challenge core beliefs, myths, and actions  Improve concentration, focus, and mindfulness in daily activities   Feel less fidgety, restless or slow in daily activities / interpersonal interactions.    Intervention(s)  Therapist will teach emotional regulation skills. distress tolerance skills, interpersonal effectiveness skills, emotion regluation skills, mindfulness skills, radical acceptance. Therapist will teach client how to ID body cues for anxiety, anxiety reduction techniques, how to ID triggers for depression and anxiety- decrease reactivity/eliminate, lifestyle changes to reduce depression and anxiety, communication skills, explore cognitive beliefs and help client to develop healthy cognitive patterns and beliefs.    Objective #B  Patient will identify three distraction and diversion activities and use those activities to decrease level of anxiety  .    Status: Continued - Date(s):  2/25/25    Intervention(s)  Therapist will teach emotional regulation skills. distress tolerance skills, interpersonal effectiveness skills, emotion regluation skills, mindfulness skills, radical acceptance. Therapist will teach client how to ID body cues for anxiety, anxiety reduction techniques, how to ID triggers for depression and anxiety- decrease reactivity/eliminate, lifestyle changes to reduce depression and anxiety, communication skills, explore cognitive beliefs and help client to develop healthy cognitive patterns and beliefs.      Patient has reviewed and agreed to the above plan.      Ricarda Bhatia [facility]
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
## 2025-04-07 NOTE — Clinical Note
Greetings,  We completed the ADHD assessment and the patient was not diagnosed with ADHD at this time due to other severe complicating diagnoses that also can explain her symptoms.  She was diagnosed with:  BO, MDD (recurrent, severe), Panic Disorder, and PTSD. It is also recommended she have a referral to a sleep specialist.  Once these symptoms are better managed, I would be happy to re-evaluate her.  I encouraged them to schedule with you to discuss recommendation options.   Their full report is available for your review.   Please let me know if you have any questions.  Loulou Mendoza, PhD LP

## 2025-04-07 NOTE — PROGRESS NOTES
[facility]                                     Progress Note - ADHD Evaluation Feedback Session    Patient Name: Radha Beckwith  Date: 4/7/2025         Service Type: Individual      Session Start Time: 10:03am  Session End Time: 10:37am     Session Length: 34min    Session #: 4    Attendees: Client attended alone    Service Modality:  Video Visit:      Provider verified identity through the following two step process.  Patient provided:  Patient is known previously to provider    Telemedicine Visit: The patient's condition can be safely assessed and treated via synchronous audio and visual telemedicine encounter.      Reason for Telemedicine Visit: Patient has requested telehealth visit    Originating Site (Patient Location): Patient's home    Distant Site (Provider Location): [facility]    Consent:  The patient/guardian has verbally consented to: the potential risks and benefits of telemedicine (video visit) versus in person care; bill my insurance or make self-payment for services provided; and responsibility for payment of non-covered services.     Patient would like the video invitation sent by:  My Chart    Mode of Communication:  Video Conference via [facility]    Distant Location (Provider):  Off-site    As the provider I attest to compliance with applicable laws and regulations related to telemedicine.    DATA  Interactive Complexity: No  Crisis: No        Progress Since Last Session (Related to Symptoms / Goals / Homework):   Symptoms: No change      Homework: Achieved / completed to satisfaction      Episode of Care Goals: No improvement - CONTEMPLATION (Considering change and yet undecided); Intervened by assessing the negative and positive thinking (ambivalence) about behavior change     Current / Ongoing Stressors and Concerns:   Provided feedback regarding ADHD evaluation. Patient's questions were answered. Patient understood  feedback.       Treatment Objective(s) Addressed in This Session:   ADHD evaluation completed. Feedback provided.      Intervention:   CBT: positive reinforcement, behavior modification  Emotion Focused Therapy: emotion checking  Motivational Interviewing: open ended questions    Assessments completed prior to visit: None     ASSESSMENT: Current Emotional / Mental Status (status of significant symptoms):   Risk status (Self / Other harm or suicidal ideation)   Patient denies current fears or concerns for personal safety.   Patient denies current or recent suicidal ideation or behaviors.   Patient denies current or recent homicidal ideation or behaviors.   Patient denies current or recent self injurious behavior or ideation.   Patient denies other safety concerns.   Patient reports there has been no change in risk factors since their last session.     Patient reports there has been no change in protective factors since their last session.     Recommended that patient call 911 or go to the local ED should there be a change in any of these risk factors     Appearance:   Appropriate    Eye Contact:   Good    Psychomotor Behavior: Normal    Attitude:   Cooperative  Interested Friendly Pleasant   Orientation:   All   Speech    Rate / Production: Normal/ Responsive Normal     Volume:  Normal    Mood:    Normal   Affect:    Appropriate    Thought Content:  Clear    Thought Form:  Coherent  Logical    Insight:    Good      Medication Review:   No changes to current psychiatric medication(s)     Medication Compliance:   Yes     Changes in Health Issues:   None reported     Chemical Use Review:   Substance Use: Chemical use reviewed, no active concerns identified      Tobacco Use: No change in amount of tobacco use since last session.       Diagnosis:  1. PTSD (post-traumatic stress disorder)    2. Severe episode of recurrent major depressive disorder, without psychotic features (H)    3. Panic disorder    4. Generalized  anxiety disorder        Collateral Reports Completed:   Routed note to Care Team Member(s)  Routed note to PCP    PLAN: (Patient Tasks / Therapist Tasks / Other)  Patient will follow-up with PCP for treatment recommendations. Patient will continue outpatient therapy. Patient will return for re-evaluation if desired once other symptoms are better managed.        Loulou Mendoza, PhD  April 7, 2025        Psychological Testing  Billing/Services Summary       Testing Evaluation Services Base: 33933  (1st 60 mins) Add-on: 81821  (each addtl 60 mins)   Record Review and Clarify Referral Question   2/26/2025 8:55-9:00am, 9:57-10:07am  3/5/2025 9:56-10:06am  3/21/2025 9:31-9:46am  4/7/2025 3:15-3:30pm 55 minutes   Intra-Session Clinical Decision Making   (Start/Stop), (Date, Day #) 0 minutes   Patient Symptom Management   (Start/Stop), (Date, Day #) 0 minutes   Clinical Decision Making/Battery Modification   (Start/Stop), (Date, Day #) 0 minutes   Integration/Report Generation   4/3/2025 9:52-9:57am CNS  4/3/2025 11:17-11:42am [place]  4/3/2025 11:42-11:52am MMPI  4/3/2025 12:00-1:35pm Report  4/7/2025 3:30-3:35pm 140 minutes   Interactive Feedback Session  4/7/2025 10:03-10:37am 34 minutes   Post-Service Work   4/7/2025 3:35-3:50pm 15 minutes   Total Time: 244 minutes (4 hours, 4 minutes)   Total Units: 1 3       Test Administration and Scoring Base: 56380  (1st 30 mins) Add-on: 77271  (each addtl 30 mins)   Test Administration (Face-to-Face)  (Start/Stop); (Start/Stop), (Date, Day #) 0 minutes   Scoring (Non-Face-to-Face)   (Start/Stop), (Date, Day #) 0 minutes   Total Time: 0 minutes (0 hours, 0 minutes)   Total Units: 0 0       Diagnosis(es): (ICD-10)  F41.1 Generalized Anxiety Disorder  F33.2 Major Depressive Disorder, recurrent episode, severe  F41.0 Panic Disorder  F43.10 Post-Traumatic Stress Disorder

## 2025-04-15 DIAGNOSIS — K21.00 GASTROESOPHAGEAL REFLUX DISEASE WITH ESOPHAGITIS WITHOUT HEMORRHAGE: ICD-10-CM

## 2025-04-15 RX ORDER — FAMOTIDINE 40 MG/1
40 TABLET, FILM COATED ORAL 2 TIMES DAILY
Qty: 60 TABLET | Refills: 0 | Status: SHIPPED | OUTPATIENT
Start: 2025-04-15

## 2025-04-21 ENCOUNTER — VIRTUAL VISIT (OUTPATIENT)
Dept: PSYCHOLOGY | Facility: CLINIC | Age: 50
End: 2025-04-21
Payer: COMMERCIAL

## 2025-04-21 DIAGNOSIS — F41.1 GENERALIZED ANXIETY DISORDER: Primary | ICD-10-CM

## 2025-04-21 DIAGNOSIS — F43.10 PTSD (POST-TRAUMATIC STRESS DISORDER): ICD-10-CM

## 2025-04-21 DIAGNOSIS — F33.2 SEVERE EPISODE OF RECURRENT MAJOR DEPRESSIVE DISORDER, WITHOUT PSYCHOTIC FEATURES (H): ICD-10-CM

## 2025-04-21 PROCEDURE — 90837 PSYTX W PT 60 MINUTES: CPT | Mod: 95 | Performed by: COUNSELOR

## 2025-04-21 ASSESSMENT — ANXIETY QUESTIONNAIRES
1. FEELING NERVOUS, ANXIOUS, OR ON EDGE: NEARLY EVERY DAY
2. NOT BEING ABLE TO STOP OR CONTROL WORRYING: NEARLY EVERY DAY
GAD7 TOTAL SCORE: 20
IF YOU CHECKED OFF ANY PROBLEMS ON THIS QUESTIONNAIRE, HOW DIFFICULT HAVE THESE PROBLEMS MADE IT FOR YOU TO DO YOUR WORK, TAKE CARE OF THINGS AT HOME, OR GET ALONG WITH OTHER PEOPLE: EXTREMELY DIFFICULT
7. FEELING AFRAID AS IF SOMETHING AWFUL MIGHT HAPPEN: NEARLY EVERY DAY
4. TROUBLE RELAXING: NEARLY EVERY DAY
7. FEELING AFRAID AS IF SOMETHING AWFUL MIGHT HAPPEN: NEARLY EVERY DAY
3. WORRYING TOO MUCH ABOUT DIFFERENT THINGS: NEARLY EVERY DAY
8. IF YOU CHECKED OFF ANY PROBLEMS, HOW DIFFICULT HAVE THESE MADE IT FOR YOU TO DO YOUR WORK, TAKE CARE OF THINGS AT HOME, OR GET ALONG WITH OTHER PEOPLE?: EXTREMELY DIFFICULT
5. BEING SO RESTLESS THAT IT IS HARD TO SIT STILL: NEARLY EVERY DAY
6. BECOMING EASILY ANNOYED OR IRRITABLE: MORE THAN HALF THE DAYS

## 2025-04-21 NOTE — PROGRESS NOTES
Answers submitted by the patient for this visit:  Patient Health Questionnaire (Submitted on 4/21/2025)  If you checked off any problems, how difficult have these problems made it for you to do your work, take care of things at home, or get along with other people?: Extremely difficult  PHQ9 TOTAL SCORE: 24  Patient Health Questionnaire (G7) (Submitted on 4/21/2025)  BO 7 TOTAL SCORE: 20          [facility]                                     Progress Note    Patient Name: Radha Beckwith  Date: 4/21/25         Service Type: Individual      Session Start Time: 2:32pm Session End Time: 3:25pm     Session Length:  53    Session #: 77    Attendees: Client    Service Modality:  video      Provider verified identity through the following two step process.  Patient provided:  Patient is known previously to provider    Telemedicine Visit: The patient's condition can be safely assessed and treated via synchronous audio and visual telemedicine encounter.      Reason for Telemedicine Visit: Patient convenience (e.g. access to timely appointments / distance to available provider)    Originating Site (Patient Location): Patient's home    Distant Site (Provider Location): Provider Remote Setting- Home Office    Consent:  The patient/guardian has verbally consented to: the potential risks and benefits of telemedicine (video visit) versus in person care; bill my insurance or make self-payment for services provided; and responsibility for payment of non-covered services.     Patient would like the video invitation sent by:  My Chart    Mode of Communication:  telephone- client did not have access to wi/fi to be able to do video session    Distant Location (Provider):  Off-site home office    As the provider I attest to compliance with applicable laws and regulations related to telemedicine.    There has been demonstrated improvement in functioning while patient has been engaged in psychotherapy/psychological  service- if withdrawn the patient would deteriorate and/or relapse.      DATA  Interactive Complexity: No  Crisis: No        Progress Since Last Session (Related to Symptoms / Goals / Homework):   Symptoms: No change .    Homework: Completed in session      Episode of Care Goals: Satisfactory progress - MAINTENANCE (Working to maintain change, with risk of relapse); Intervened by continuing to positively reinforce healthy behavior choice      Current / Ongoing Stressors and Concerns:   Stated she got her results back from the ADHD testing. Yanna said that she has a lot of symptoms but can't confirm a diagnosis because of everything else she has going on physically and mentally. Too much overlap.    New medication- guanfacine 1mg. Started a few days ago. Started on Vraylar too but seeing if it helps. Did the genesite testing.   Thinks she has reynodes.        Treatment Objective(s) Addressed in This Session:   Increase interest, engagement, and pleasure in doing things  Feel less tired and more energy during the day        Intervention:   Talked through some of the results from the testing and educated her about some of the sections she had questions about. Discussed her upcoming doctor appointments too and which things she wants to get tested for that could be impacting mental and physical health.     Assessments completed prior to visit:  The following assessments were completed by patient for this visit:  PHQ9:       2/17/2025    12:20 PM 2/24/2025    12:02 PM 2/25/2025     6:49 PM 3/3/2025     9:02 AM 3/10/2025     2:30 PM 3/17/2025    11:36 AM 4/21/2025     1:54 PM   PHQ-9 SCORE   PHQ-9 Total Score MyChart 23 (Severe depression) 24 (Severe depression) 24 (Severe depression) 24 (Severe depression) 22 (Severe depression) 23 (Severe depression) 24 (Severe depression)   PHQ-9 Total Score 23  24  24  24  22  23  24        Patient-reported     GAD7:       12/1/2024     4:42 PM 12/16/2024     1:05 PM 1/6/2025     3:30  PM 2/5/2025     7:35 PM 2/24/2025    12:03 PM 3/17/2025    11:37 AM 4/21/2025     1:55 PM   BO-7 SCORE   Total Score 20 (severe anxiety) 18 (severe anxiety) 21 (severe anxiety) 20 (severe anxiety) 20 (severe anxiety) 20 (severe anxiety) 20 (severe anxiety)   Total Score 20  18  21  20  20  20  20        Patient-reported     PROMIS 10-Global Health (all questions and answers displayed):       8/10/2024     2:51 PM 8/18/2024    11:36 AM 8/28/2024     2:07 PM 12/1/2024     4:43 PM 12/16/2024     1:07 PM 1/6/2025     3:31 PM 2/24/2025    12:00 PM   PROMIS 10   In general, would you say your health is: Fair Poor Poor Poor Poor Poor Poor   In general, would you say your quality of life is: Poor Fair Fair Poor Poor Poor Poor   In general, how would you rate your physical health? Poor Poor Fair Poor Poor Fair Poor   In general, how would you rate your mental health, including your mood and your ability to think? Poor Poor Poor Poor Poor Poor Poor   In general, how would you rate your satisfaction with your social activities and relationships? Poor Fair Fair Poor Fair Fair Poor   In general, please rate how well you carry out your usual social activities and roles Poor Fair Fair Poor Poor Poor Poor   To what extent are you able to carry out your everyday physical activities such as walking, climbing stairs, carrying groceries, or moving a chair? A little A little A little A little A little A little A little   In the past 7 days, how often have you been bothered by emotional problems such as feeling anxious, depressed, or irritable? Always Always Always Always Always Always Always   In the past 7 days, how would you rate your fatigue on average? Very severe Very severe Severe Severe Very severe Very severe Severe   In the past 7 days, how would you rate your pain on average, where 0 means no pain, and 10 means worst imaginable pain? 7 7 7 8 7 8 7   In general, would you say your health is: 2 1 1 1 1 1 1   In general, would  you say your quality of life is: 1 2 2 1 1 1 1   In general, how would you rate your physical health? 1 1 2 1 1 2 1   In general, how would you rate your mental health, including your mood and your ability to think? 1 1 1 1 1 1 1   In general, how would you rate your satisfaction with your social activities and relationships? 1 2 2 1 2 2 1   In general, please rate how well you carry out your usual social activities and roles. (This includes activities at home, at work and in your community, and responsibilities as a parent, child, spouse, employee, friend, etc.) 1 2 2 1 1 1 1   To what extent are you able to carry out your everyday physical activities such as walking, climbing stairs, carrying groceries, or moving a chair? 2 2 2 2 2 2 2   In the past 7 days, how often have you been bothered by emotional problems such as feeling anxious, depressed, or irritable? 5 5 5 5 5 5 5   In the past 7 days, how would you rate your fatigue on average? 5 5 4 4 5 5 4   In the past 7 days, how would you rate your pain on average, where 0 means no pain, and 10 means worst imaginable pain? 7 7 7 8 7 8 7   Global Mental Health Score 4    4 6 6 4  5  5  4    Global Physical Health Score 6    6 6 8 7  6  7  7    PROMIS TOTAL - SUBSCORES 10    10 12 14 11  11  12  11        Patient-reported     Lea Suicide Severity Rating Scale (Lifetime/Recent)      8/31/2024    12:07 PM 9/5/2024     5:33 PM 9/13/2024    10:52 AM 10/4/2024     4:54 PM 11/22/2024    11:34 AM 12/25/2024    11:30 PM 1/28/2025     1:23 PM   Lea Suicide Severity Rating (Lifetime/Recent)   Q1 Wished to be Dead (Past Month) 0-->no 0-->no 0-->no 0-->no 0-->no 0-->no 0-->no   Q2 Suicidal Thoughts (Past Month) 0-->no 0-->no 0-->no 0-->no 0-->no 0-->no 0-->no   Q6 Suicide Behavior (Lifetime) 0-->no 1-->yes 0-->no 0-->no 0-->no 0-->no 0-->no   If yes to Q6, within past 3 months?  0-->no        Level of Risk per Screen no risks indicated moderate risk no risks indicated  no risks indicated no risks indicated no risks indicated no risks indicated         ASSESSMENT: Current Emotional / Mental Status (status of significant symptoms):   Risk status (Self / Other harm or suicidal ideation)   Patient denies current fears or concerns for personal safety.   Patient denies current or recent suicidal ideation or behaviors.   Patient denies current or recent homicidal ideation or behaviors.   Patient denies current or recent self injurious behavior or ideation.   Patient denies other safety concerns.   Patient reports there has been no change in risk factors since their last session.     Patient reports there has been no change in protective factors since their last session.     Recommended that patient call 911 or go to the local ED should there be a change in any of these risk factors.     Appearance:   Appropriate    Eye Contact:   Good    Psychomotor Behavior: Normal    Attitude:   Cooperative    Orientation:   All   Speech    Rate / Production: Normal/ Responsive Normal     Volume:  Normal    Mood:    Normal   Affect:    Appropriate    Thought Content:  Clear    Thought Form:  Coherent    Insight:    Good      Medication Review:   No changes to current psychiatric medication(s)     Medication Compliance:   Yes     Changes in Health Issues:   None reported     Chemical Use Review:   Substance Use: Chemical use reviewed, no active concerns identified      Tobacco Use: No change in amount of tobacco use since last session.  Patient declined discussion at this time    Diagnosis:  1. Generalized anxiety disorder    2. PTSD (post-traumatic stress disorder)    3. Severe episode of recurrent major depressive disorder, without psychotic features (H)            Collateral Reports Completed:   Not Applicable    PLAN: (Patient Tasks / Therapist Tasks / Other)  Practice anxiety reduction skills daily.   Check in with PCP.            Ricarda Bhatia [facility]                                                          ______________________________________________________________________    Individual Treatment Plan    Patient's Name: Radha Beckwith  YOB: 1975    Date of Creation: 6/28/23  Date Treatment Plan Last Reviewed/Revised: 2/25/25    DSM5 Diagnoses: 296.32 (F33.1) Major Depressive Disorder, Recurrent Episode, Moderate _  Psychosocial / Contextual Factors: living with boyfriend, memory issues  PROMIS (reviewed every 90 days):     Referral / Collaboration:  Referral to another professional/service is not indicated at this time..    Anticipated number of session for this episode of care: 9-12 sessions  Anticipation frequency of session:  as needed  Anticipated Duration of each session: 38-52 minutes  Treatment plan will be reviewed in 90 days or when goals have been changed.       MeasurableTreatment Goal(s) related to diagnosis / functional impairment(s)  Goal 1: Patient will increase communication skills with family members.    Objective #A (Patient Action)    Patient will learn & utilize at least 2 assertive communication skills weekly.  Status: Continued - Date(s): 2/25/25    Intervention(s)  Therapist will teach emotional regulation skills. distress tolerance skills, interpersonal effectiveness skills, emotion regluation skills, mindfulness skills, radical acceptance. Therapist will teach client how to ID body cues for anxiety, anxiety reduction techniques, how to ID triggers for depression and anxiety- decrease reactivity/eliminate, lifestyle changes to reduce depression and anxiety, communication skills, explore cognitive beliefs and help client to develop healthy cognitive patterns and beliefs.    Objective #B  Patient will use thought-stopping strategy daily to reduce intrusive thoughts.  Status: Continued - Date(s): 2/25/25    Intervention(s)  Therapist will teach emotional regulation skills. distress tolerance skills, interpersonal effectiveness skills, emotion regluation skills,  mindfulness skills, radical acceptance. Therapist will teach client how to ID body cues for anxiety, anxiety reduction techniques, how to ID triggers for depression and anxiety- decrease reactivity/eliminate, lifestyle changes to reduce depression and anxiety, communication skills, explore cognitive beliefs and help client to develop healthy cognitive patterns and beliefs.      Goal 2: Patient will decrease depression symptoms.      Objective #A (Patient Action)    Status: Continued - Date(s): 2/25/25    Patient will Increase interest, engagement, and pleasure in doing things  Decrease frequency and intensity of feeling down, depressed, hopeless  Improve quantity and quality of night time sleep / decrease daytime naps  Feel less tired and more energy during the day   Improve diet, appetite, mindful eating, and / or meal planning  Identify negative self-talk and behaviors: challenge core beliefs, myths, and actions  Improve concentration, focus, and mindfulness in daily activities   Feel less fidgety, restless or slow in daily activities / interpersonal interactions.    Intervention(s)  Therapist will teach emotional regulation skills. distress tolerance skills, interpersonal effectiveness skills, emotion regluation skills, mindfulness skills, radical acceptance. Therapist will teach client how to ID body cues for anxiety, anxiety reduction techniques, how to ID triggers for depression and anxiety- decrease reactivity/eliminate, lifestyle changes to reduce depression and anxiety, communication skills, explore cognitive beliefs and help client to develop healthy cognitive patterns and beliefs.    Objective #B  Patient will identify three distraction and diversion activities and use those activities to decrease level of anxiety  .    Status: Continued - Date(s):  2/25/25    Intervention(s)  Therapist will teach emotional regulation skills. distress tolerance skills, interpersonal effectiveness skills, emotion  regluation skills, mindfulness skills, radical acceptance. Therapist will teach client how to ID body cues for anxiety, anxiety reduction techniques, how to ID triggers for depression and anxiety- decrease reactivity/eliminate, lifestyle changes to reduce depression and anxiety, communication skills, explore cognitive beliefs and help client to develop healthy cognitive patterns and beliefs.      Patient has reviewed and agreed to the above plan.      Ricarda Bhatia [facility]

## 2025-04-27 SDOH — HEALTH STABILITY: PHYSICAL HEALTH
ON AVERAGE, HOW MANY DAYS PER WEEK DO YOU ENGAGE IN MODERATE TO STRENUOUS EXERCISE (LIKE A BRISK WALK)?: PATIENT DECLINED

## 2025-04-27 SDOH — HEALTH STABILITY: PHYSICAL HEALTH: ON AVERAGE, HOW MANY MINUTES DO YOU ENGAGE IN EXERCISE AT THIS LEVEL?: PATIENT DECLINED

## 2025-04-27 ASSESSMENT — SOCIAL DETERMINANTS OF HEALTH (SDOH): HOW OFTEN DO YOU GET TOGETHER WITH FRIENDS OR RELATIVES?: NEVER

## 2025-05-05 ENCOUNTER — VIRTUAL VISIT (OUTPATIENT)
Dept: PSYCHOLOGY | Facility: CLINIC | Age: 50
End: 2025-05-05
Payer: COMMERCIAL

## 2025-05-05 DIAGNOSIS — F41.1 GENERALIZED ANXIETY DISORDER: Primary | ICD-10-CM

## 2025-05-05 DIAGNOSIS — F43.10 PTSD (POST-TRAUMATIC STRESS DISORDER): ICD-10-CM

## 2025-05-05 DIAGNOSIS — F33.2 SEVERE EPISODE OF RECURRENT MAJOR DEPRESSIVE DISORDER, WITHOUT PSYCHOTIC FEATURES (H): ICD-10-CM

## 2025-05-05 PROCEDURE — 90834 PSYTX W PT 45 MINUTES: CPT | Mod: 95 | Performed by: COUNSELOR

## 2025-05-05 ASSESSMENT — ANXIETY QUESTIONNAIRES
GAD7 TOTAL SCORE: 19
3. WORRYING TOO MUCH ABOUT DIFFERENT THINGS: NEARLY EVERY DAY
1. FEELING NERVOUS, ANXIOUS, OR ON EDGE: NEARLY EVERY DAY
IF YOU CHECKED OFF ANY PROBLEMS ON THIS QUESTIONNAIRE, HOW DIFFICULT HAVE THESE PROBLEMS MADE IT FOR YOU TO DO YOUR WORK, TAKE CARE OF THINGS AT HOME, OR GET ALONG WITH OTHER PEOPLE: EXTREMELY DIFFICULT
2. NOT BEING ABLE TO STOP OR CONTROL WORRYING: NEARLY EVERY DAY
GAD7 TOTAL SCORE: 19
7. FEELING AFRAID AS IF SOMETHING AWFUL MIGHT HAPPEN: MORE THAN HALF THE DAYS
GAD7 TOTAL SCORE: 19
GAD7 TOTAL SCORE: 19
4. TROUBLE RELAXING: NEARLY EVERY DAY
7. FEELING AFRAID AS IF SOMETHING AWFUL MIGHT HAPPEN: MORE THAN HALF THE DAYS
GAD7 TOTAL SCORE: 19
2. NOT BEING ABLE TO STOP OR CONTROL WORRYING: NEARLY EVERY DAY
IF YOU CHECKED OFF ANY PROBLEMS ON THIS QUESTIONNAIRE, HOW DIFFICULT HAVE THESE PROBLEMS MADE IT FOR YOU TO DO YOUR WORK, TAKE CARE OF THINGS AT HOME, OR GET ALONG WITH OTHER PEOPLE: EXTREMELY DIFFICULT
4. TROUBLE RELAXING: NEARLY EVERY DAY
8. IF YOU CHECKED OFF ANY PROBLEMS, HOW DIFFICULT HAVE THESE MADE IT FOR YOU TO DO YOUR WORK, TAKE CARE OF THINGS AT HOME, OR GET ALONG WITH OTHER PEOPLE?: EXTREMELY DIFFICULT
8. IF YOU CHECKED OFF ANY PROBLEMS, HOW DIFFICULT HAVE THESE MADE IT FOR YOU TO DO YOUR WORK, TAKE CARE OF THINGS AT HOME, OR GET ALONG WITH OTHER PEOPLE?: EXTREMELY DIFFICULT
5. BEING SO RESTLESS THAT IT IS HARD TO SIT STILL: NEARLY EVERY DAY
1. FEELING NERVOUS, ANXIOUS, OR ON EDGE: NEARLY EVERY DAY
3. WORRYING TOO MUCH ABOUT DIFFERENT THINGS: NEARLY EVERY DAY
6. BECOMING EASILY ANNOYED OR IRRITABLE: MORE THAN HALF THE DAYS
6. BECOMING EASILY ANNOYED OR IRRITABLE: MORE THAN HALF THE DAYS
7. FEELING AFRAID AS IF SOMETHING AWFUL MIGHT HAPPEN: MORE THAN HALF THE DAYS
5. BEING SO RESTLESS THAT IT IS HARD TO SIT STILL: NEARLY EVERY DAY

## 2025-05-05 NOTE — PROGRESS NOTES
Answers submitted by the patient for this visit:  Patient Health Questionnaire (Submitted on 5/5/2025)  If you checked off any problems, how difficult have these problems made it for you to do your work, take care of things at home, or get along with other people?: Extremely difficult  PHQ9 TOTAL SCORE: 24  Patient Health Questionnaire (G7) (Submitted on 5/5/2025)  BO 7 TOTAL SCORE: 19        [facility]                                     Progress Note    Patient Name: Radha Beckwith  Date: 5/5/25         Service Type: Individual      Session Start Time: 2:30pm Session End Time: 3:20pm     Session Length:  50    Session #: 78    Attendees: Client    Service Modality:  video      Provider verified identity through the following two step process.  Patient provided:  Patient is known previously to provider    Telemedicine Visit: The patient's condition can be safely assessed and treated via synchronous audio and visual telemedicine encounter.      Reason for Telemedicine Visit: Patient convenience (e.g. access to timely appointments / distance to available provider)    Originating Site (Patient Location): Patient's home    Distant Site (Provider Location): Provider Remote Setting- Home Office    Consent:  The patient/guardian has verbally consented to: the potential risks and benefits of telemedicine (video visit) versus in person care; bill my insurance or make self-payment for services provided; and responsibility for payment of non-covered services.     Patient would like the video invitation sent by:  My Chart    Mode of Communication:  telephone- client did not have access to wi/fi to be able to do video session    Distant Location (Provider):  Off-site home office    As the provider I attest to compliance with applicable laws and regulations related to telemedicine.    There has been demonstrated improvement in functioning while patient has been engaged in psychotherapy/psychological  service- if withdrawn the patient would deteriorate and/or relapse.      DATA  Interactive Complexity: No  Crisis: No        Progress Since Last Session (Related to Symptoms / Goals / Homework):   Symptoms: No change .    Homework: Completed in session      Episode of Care Goals: Satisfactory progress - MAINTENANCE (Working to maintain change, with risk of relapse); Intervened by continuing to positively reinforce healthy behavior choice      Current / Ongoing Stressors and Concerns:   Stated she feels like any care she's seeking to help better her health hasn't been helping. Feels like things get worse.   Got put on an ADHD medication and started on pristiq. Has been feeling much more tired lately and has been having a lot more pain.   Noticing that she's having involuntary muscle movements/spasms.   Having a hard time with Mother's Day coming up.        Treatment Objective(s) Addressed in This Session:   Increase interest, engagement, and pleasure in doing things  Feel less tired and more energy during the day        Intervention:   Processed her feelings about Mother's day and how hard this day usually is for her. Stated she feels a lot of grief around it since losing her mom and her son isn't in the same state to celebrate. Processed conflicts with her boyfriend. Validated the helplessness and hopelessness she feels about her health issues.     Assessments completed prior to visit:  The following assessments were completed by patient for this visit:  PHQ9:       2/24/2025    12:02 PM 2/25/2025     6:49 PM 3/3/2025     9:02 AM 3/10/2025     2:30 PM 3/17/2025    11:36 AM 4/21/2025     1:54 PM 5/5/2025    11:49 AM   PHQ-9 SCORE   PHQ-9 Total Score MyChart 24 (Severe depression) 24 (Severe depression) 24 (Severe depression) 22 (Severe depression) 23 (Severe depression) 24 (Severe depression) 24 (Severe depression)   PHQ-9 Total Score 24  24  24  22  23  24  24        Patient-reported     GAD7:       12/16/2024      1:05 PM 1/6/2025     3:30 PM 2/5/2025     7:35 PM 2/24/2025    12:03 PM 3/17/2025    11:37 AM 4/21/2025     1:55 PM 5/5/2025    11:50 AM   BO-7 SCORE   Total Score 18 (severe anxiety) 21 (severe anxiety) 20 (severe anxiety) 20 (severe anxiety) 20 (severe anxiety) 20 (severe anxiety) 19 (severe anxiety)   Total Score 18  21  20  20  20  20  19        Patient-reported     PROMIS 10-Global Health (all questions and answers displayed):       8/10/2024     2:51 PM 8/18/2024    11:36 AM 8/28/2024     2:07 PM 12/1/2024     4:43 PM 12/16/2024     1:07 PM 1/6/2025     3:31 PM 2/24/2025    12:00 PM   PROMIS 10   In general, would you say your health is: Fair Poor Poor Poor Poor Poor Poor   In general, would you say your quality of life is: Poor Fair Fair Poor Poor Poor Poor   In general, how would you rate your physical health? Poor Poor Fair Poor Poor Fair Poor   In general, how would you rate your mental health, including your mood and your ability to think? Poor Poor Poor Poor Poor Poor Poor   In general, how would you rate your satisfaction with your social activities and relationships? Poor Fair Fair Poor Fair Fair Poor   In general, please rate how well you carry out your usual social activities and roles Poor Fair Fair Poor Poor Poor Poor   To what extent are you able to carry out your everyday physical activities such as walking, climbing stairs, carrying groceries, or moving a chair? A little A little A little A little A little A little A little   In the past 7 days, how often have you been bothered by emotional problems such as feeling anxious, depressed, or irritable? Always Always Always Always Always Always Always   In the past 7 days, how would you rate your fatigue on average? Very severe Very severe Severe Severe Very severe Very severe Severe   In the past 7 days, how would you rate your pain on average, where 0 means no pain, and 10 means worst imaginable pain? 7 7 7 8 7 8 7   In general, would you say your  health is: 2 1 1 1 1 1 1   In general, would you say your quality of life is: 1 2 2 1 1 1 1   In general, how would you rate your physical health? 1 1 2 1 1 2 1   In general, how would you rate your mental health, including your mood and your ability to think? 1 1 1 1 1 1 1   In general, how would you rate your satisfaction with your social activities and relationships? 1 2 2 1 2 2 1   In general, please rate how well you carry out your usual social activities and roles. (This includes activities at home, at work and in your community, and responsibilities as a parent, child, spouse, employee, friend, etc.) 1 2 2 1 1 1 1   To what extent are you able to carry out your everyday physical activities such as walking, climbing stairs, carrying groceries, or moving a chair? 2 2 2 2 2 2 2   In the past 7 days, how often have you been bothered by emotional problems such as feeling anxious, depressed, or irritable? 5 5 5 5 5 5 5   In the past 7 days, how would you rate your fatigue on average? 5 5 4 4 5 5 4   In the past 7 days, how would you rate your pain on average, where 0 means no pain, and 10 means worst imaginable pain? 7 7 7 8 7 8 7   Global Mental Health Score 4    4 6 6 4  5  5  4    Global Physical Health Score 6    6 6 8 7  6  7  7    PROMIS TOTAL - SUBSCORES 10    10 12 14 11  11  12  11        Patient-reported     [place] Suicide Severity Rating Scale (Lifetime/Recent)      8/31/2024    12:07 PM 9/5/2024     5:33 PM 9/13/2024    10:52 AM 10/4/2024     4:54 PM 11/22/2024    11:34 AM 12/25/2024    11:30 PM 1/28/2025     1:23 PM   [place] Suicide Severity Rating (Lifetime/Recent)   Q1 Wished to be Dead (Past Month) 0-->no 0-->no 0-->no 0-->no 0-->no 0-->no 0-->no   Q2 Suicidal Thoughts (Past Month) 0-->no 0-->no 0-->no 0-->no 0-->no 0-->no 0-->no   Q6 Suicide Behavior (Lifetime) 0-->no 1-->yes 0-->no 0-->no 0-->no 0-->no 0-->no   If yes to Q6, within past 3 months?  0-->no        Level of Risk per Screen no  risks indicated moderate risk no risks indicated no risks indicated no risks indicated no risks indicated no risks indicated         ASSESSMENT: Current Emotional / Mental Status (status of significant symptoms):   Risk status (Self / Other harm or suicidal ideation)   Patient denies current fears or concerns for personal safety.   Patient denies current or recent suicidal ideation or behaviors.   Patient denies current or recent homicidal ideation or behaviors.   Patient denies current or recent self injurious behavior or ideation.   Patient denies other safety concerns.   Patient reports there has been no change in risk factors since their last session.     Patient reports there has been no change in protective factors since their last session.     Recommended that patient call 911 or go to the local ED should there be a change in any of these risk factors.     Appearance:   Appropriate    Eye Contact:   Good    Psychomotor Behavior: Normal    Attitude:   Cooperative    Orientation:   All   Speech    Rate / Production: Normal/ Responsive Normal     Volume:  Normal    Mood:    Normal   Affect:    Appropriate    Thought Content:  Clear    Thought Form:  Coherent    Insight:    Good      Medication Review:   No changes to current psychiatric medication(s)     Medication Compliance:   Yes     Changes in Health Issues:   None reported     Chemical Use Review:   Substance Use: Chemical use reviewed, no active concerns identified      Tobacco Use: No change in amount of tobacco use since last session.  Patient declined discussion at this time    Diagnosis:  1. Generalized anxiety disorder    2. PTSD (post-traumatic stress disorder)    3. Severe episode of recurrent major depressive disorder, without psychotic features (H)            Collateral Reports Completed:   Not Applicable    PLAN: (Patient Tasks / Therapist Tasks / Other)  Continue to utilize self care and stress reduction skills daily.             Ricarda  English, LPCC                                                         ______________________________________________________________________    Individual Treatment Plan    Patient's Name: Radha Beckwith  YOB: 1975    Date of Creation: 6/28/23  Date Treatment Plan Last Reviewed/Revised: 5/5/25    DSM5 Diagnoses: 296.32 (F33.1) Major Depressive Disorder, Recurrent Episode, Moderate _  Psychosocial / Contextual Factors: living with boyfriend, memory issues  PROMIS (reviewed every 90 days):     Referral / Collaboration:  Referral to another professional/service is not indicated at this time..    Anticipated number of session for this episode of care: 9-12 sessions  Anticipation frequency of session:  as needed  Anticipated Duration of each session: 38-52 minutes  Treatment plan will be reviewed in 90 days or when goals have been changed.       MeasurableTreatment Goal(s) related to diagnosis / functional impairment(s)  Goal 1: Patient will increase communication skills with family members.    Objective #A (Patient Action)    Patient will learn & utilize at least 2 assertive communication skills weekly.  Status: Continued - Date(s): 5/5/25    Intervention(s)  Therapist will teach emotional regulation skills. distress tolerance skills, interpersonal effectiveness skills, emotion regluation skills, mindfulness skills, radical acceptance. Therapist will teach client how to ID body cues for anxiety, anxiety reduction techniques, how to ID triggers for depression and anxiety- decrease reactivity/eliminate, lifestyle changes to reduce depression and anxiety, communication skills, explore cognitive beliefs and help client to develop healthy cognitive patterns and beliefs.    Objective #B  Patient will use thought-stopping strategy daily to reduce intrusive thoughts.  Status: Continued - Date(s): 5/5/25    Intervention(s)  Therapist will teach emotional regulation skills. distress tolerance skills,  interpersonal effectiveness skills, emotion regluation skills, mindfulness skills, radical acceptance. Therapist will teach client how to ID body cues for anxiety, anxiety reduction techniques, how to ID triggers for depression and anxiety- decrease reactivity/eliminate, lifestyle changes to reduce depression and anxiety, communication skills, explore cognitive beliefs and help client to develop healthy cognitive patterns and beliefs.      Goal 2: Patient will decrease depression symptoms.      Objective #A (Patient Action)    Status: Continued - Date(s): 5/5/25    Patient will Increase interest, engagement, and pleasure in doing things  Decrease frequency and intensity of feeling down, depressed, hopeless  Improve quantity and quality of night time sleep / decrease daytime naps  Feel less tired and more energy during the day   Improve diet, appetite, mindful eating, and / or meal planning  Identify negative self-talk and behaviors: challenge core beliefs, myths, and actions  Improve concentration, focus, and mindfulness in daily activities   Feel less fidgety, restless or slow in daily activities / interpersonal interactions.    Intervention(s)  Therapist will teach emotional regulation skills. distress tolerance skills, interpersonal effectiveness skills, emotion regluation skills, mindfulness skills, radical acceptance. Therapist will teach client how to ID body cues for anxiety, anxiety reduction techniques, how to ID triggers for depression and anxiety- decrease reactivity/eliminate, lifestyle changes to reduce depression and anxiety, communication skills, explore cognitive beliefs and help client to develop healthy cognitive patterns and beliefs.    Objective #B  Patient will identify three distraction and diversion activities and use those activities to decrease level of anxiety  .    Status: Continued - Date(s):  5/5/25    Intervention(s)  Therapist will teach emotional regulation skills. distress tolerance  skills, interpersonal effectiveness skills, emotion regluation skills, mindfulness skills, radical acceptance. Therapist will teach client how to ID body cues for anxiety, anxiety reduction techniques, how to ID triggers for depression and anxiety- decrease reactivity/eliminate, lifestyle changes to reduce depression and anxiety, communication skills, explore cognitive beliefs and help client to develop healthy cognitive patterns and beliefs.      Patient has reviewed and agreed to the above plan.      Ricarda Bhatia [facility]

## 2025-05-12 ENCOUNTER — VIRTUAL VISIT (OUTPATIENT)
Dept: PSYCHOLOGY | Facility: CLINIC | Age: 50
End: 2025-05-12
Payer: COMMERCIAL

## 2025-05-12 DIAGNOSIS — F33.2 SEVERE EPISODE OF RECURRENT MAJOR DEPRESSIVE DISORDER, WITHOUT PSYCHOTIC FEATURES (H): ICD-10-CM

## 2025-05-12 DIAGNOSIS — F43.10 PTSD (POST-TRAUMATIC STRESS DISORDER): ICD-10-CM

## 2025-05-12 DIAGNOSIS — F41.1 GENERALIZED ANXIETY DISORDER: Primary | ICD-10-CM

## 2025-05-12 PROCEDURE — 90834 PSYTX W PT 45 MINUTES: CPT | Mod: 95 | Performed by: COUNSELOR

## 2025-05-12 NOTE — PROGRESS NOTES
Answers submitted by the patient for this visit:  Patient Health Questionnaire (Submitted on 5/12/2025)  If you checked off any problems, how difficult have these problems made it for you to do your work, take care of things at home, or get along with other people?: Extremely difficult  PHQ9 TOTAL SCORE: 22  Patient Health Questionnaire (G7) (Submitted on 5/5/2025)  BO 7 TOTAL SCORE: 19        [facility]                                     Progress Note    Patient Name: Radha Beckwith  Date: 5/12/25         Service Type: Individual      Session Start Time: 2:30pm Session End Time: 3:20pm     Session Length:  50    Session #: 78    Attendees: Client    Service Modality:  video      Provider verified identity through the following two step process.  Patient provided:  Patient is known previously to provider    Telemedicine Visit: The patient's condition can be safely assessed and treated via synchronous audio and visual telemedicine encounter.      Reason for Telemedicine Visit: Patient convenience (e.g. access to timely appointments / distance to available provider)    Originating Site (Patient Location): Patient's home    Distant Site (Provider Location): Provider Remote Setting- Home Office    Consent:  The patient/guardian has verbally consented to: the potential risks and benefits of telemedicine (video visit) versus in person care; bill my insurance or make self-payment for services provided; and responsibility for payment of non-covered services.     Patient would like the video invitation sent by:  My Chart    Mode of Communication:  telephone- client did not have access to wi/fi to be able to do video session    Distant Location (Provider):  Off-site home office    As the provider I attest to compliance with applicable laws and regulations related to telemedicine.    There has been demonstrated improvement in functioning while patient has been engaged in psychotherapy/psychological  service- if withdrawn the patient would deteriorate and/or relapse.      DATA  Interactive Complexity: No  Crisis: No        Progress Since Last Session (Related to Symptoms / Goals / Homework):   Symptoms: No change .    Homework: Completed in session      Episode of Care Goals: Satisfactory progress - MAINTENANCE (Working to maintain change, with risk of relapse); Intervened by continuing to positively reinforce healthy behavior choice      Current / Ongoing Stressors and Concerns:   Stated she had an okay mothers day. Got to talk to her son. Went up to her boyfriend's parents house. Struggling with relationship issues.   Stopped taking the pristiq due to side effects.        Treatment Objective(s) Addressed in This Session:   Increase interest, engagement, and pleasure in doing things  Feel less tired and more energy during the day        Intervention:   Processed through how Mother's Day went with her boyfriend and his family. Discussed her emotions from the visit. Stated his dad made some comments about her teeth and weight that she overheard.  Talked about how she stopped the one medication and how she's feeling better now that she's off of it.     Assessments completed prior to visit:  The following assessments were completed by patient for this visit:  PHQ9:       2/25/2025     6:49 PM 3/3/2025     9:02 AM 3/10/2025     2:30 PM 3/17/2025    11:36 AM 4/21/2025     1:54 PM 5/5/2025    11:49 AM 5/12/2025     2:03 PM   PHQ-9 SCORE   PHQ-9 Total Score MyChart 24 (Severe depression) 24 (Severe depression) 22 (Severe depression) 23 (Severe depression) 24 (Severe depression) 24 (Severe depression) 22 (Severe depression)   PHQ-9 Total Score 24  24  22  23  24  24  22        Patient-reported     GAD7:       12/16/2024     1:05 PM 1/6/2025     3:30 PM 2/5/2025     7:35 PM 2/24/2025    12:03 PM 3/17/2025    11:37 AM 4/21/2025     1:55 PM 5/5/2025    11:50 AM   BO-7 SCORE   Total Score 18 (severe anxiety) 21 (severe  anxiety) 20 (severe anxiety) 20 (severe anxiety) 20 (severe anxiety) 20 (severe anxiety) 19 (severe anxiety)   Total Score 18  21  20  20  20  20  19        Patient-reported     PROMIS 10-Global Health (all questions and answers displayed):       8/10/2024     2:51 PM 8/18/2024    11:36 AM 8/28/2024     2:07 PM 12/1/2024     4:43 PM 12/16/2024     1:07 PM 1/6/2025     3:31 PM 2/24/2025    12:00 PM   PROMIS 10   In general, would you say your health is: Fair Poor Poor Poor Poor Poor Poor   In general, would you say your quality of life is: Poor Fair Fair Poor Poor Poor Poor   In general, how would you rate your physical health? Poor Poor Fair Poor Poor Fair Poor   In general, how would you rate your mental health, including your mood and your ability to think? Poor Poor Poor Poor Poor Poor Poor   In general, how would you rate your satisfaction with your social activities and relationships? Poor Fair Fair Poor Fair Fair Poor   In general, please rate how well you carry out your usual social activities and roles Poor Fair Fair Poor Poor Poor Poor   To what extent are you able to carry out your everyday physical activities such as walking, climbing stairs, carrying groceries, or moving a chair? A little A little A little A little A little A little A little   In the past 7 days, how often have you been bothered by emotional problems such as feeling anxious, depressed, or irritable? Always Always Always Always Always Always Always   In the past 7 days, how would you rate your fatigue on average? Very severe Very severe Severe Severe Very severe Very severe Severe   In the past 7 days, how would you rate your pain on average, where 0 means no pain, and 10 means worst imaginable pain? 7 7 7 8 7 8 7   In general, would you say your health is: 2 1 1 1 1 1 1   In general, would you say your quality of life is: 1 2 2 1 1 1 1   In general, how would you rate your physical health? 1 1 2 1 1 2 1   In general, how would you rate  your mental health, including your mood and your ability to think? 1 1 1 1 1 1 1   In general, how would you rate your satisfaction with your social activities and relationships? 1 2 2 1 2 2 1   In general, please rate how well you carry out your usual social activities and roles. (This includes activities at home, at work and in your community, and responsibilities as a parent, child, spouse, employee, friend, etc.) 1 2 2 1 1 1 1   To what extent are you able to carry out your everyday physical activities such as walking, climbing stairs, carrying groceries, or moving a chair? 2 2 2 2 2 2 2   In the past 7 days, how often have you been bothered by emotional problems such as feeling anxious, depressed, or irritable? 5 5 5 5 5 5 5   In the past 7 days, how would you rate your fatigue on average? 5 5 4 4 5 5 4   In the past 7 days, how would you rate your pain on average, where 0 means no pain, and 10 means worst imaginable pain? 7 7 7 8 7 8 7   Global Mental Health Score 4    4 6 6 4  5  5  4    Global Physical Health Score 6    6 6 8 7  6  7  7    PROMIS TOTAL - SUBSCORES 10    10 12 14 11  11  12  11        Patient-reported     [place] Suicide Severity Rating Scale (Lifetime/Recent)      8/31/2024    12:07 PM 9/5/2024     5:33 PM 9/13/2024    10:52 AM 10/4/2024     4:54 PM 11/22/2024    11:34 AM 12/25/2024    11:30 PM 1/28/2025     1:23 PM   [place] Suicide Severity Rating (Lifetime/Recent)   Q1 Wished to be Dead (Past Month) 0-->no 0-->no 0-->no 0-->no 0-->no 0-->no 0-->no   Q2 Suicidal Thoughts (Past Month) 0-->no 0-->no 0-->no 0-->no 0-->no 0-->no 0-->no   Q6 Suicide Behavior (Lifetime) 0-->no 1-->yes 0-->no 0-->no 0-->no 0-->no 0-->no   If yes to Q6, within past 3 months?  0-->no        Level of Risk per Screen no risks indicated moderate risk no risks indicated no risks indicated no risks indicated no risks indicated no risks indicated         ASSESSMENT: Current Emotional / Mental Status (status of  significant symptoms):   Risk status (Self / Other harm or suicidal ideation)   Patient denies current fears or concerns for personal safety.   Patient denies current or recent suicidal ideation or behaviors.   Patient denies current or recent homicidal ideation or behaviors.   Patient denies current or recent self injurious behavior or ideation.   Patient denies other safety concerns.   Patient reports there has been no change in risk factors since their last session.     Patient reports there has been no change in protective factors since their last session.     Recommended that patient call 911 or go to the local ED should there be a change in any of these risk factors.     Appearance:   Appropriate    Eye Contact:   Good    Psychomotor Behavior: Normal    Attitude:   Cooperative    Orientation:   All   Speech    Rate / Production: Normal/ Responsive Normal     Volume:  Normal    Mood:    Normal   Affect:    Appropriate    Thought Content:  Clear    Thought Form:  Coherent    Insight:    Good      Medication Review:   No changes to current psychiatric medication(s)     Medication Compliance:   Yes     Changes in Health Issues:   None reported     Chemical Use Review:   Substance Use: Chemical use reviewed, no active concerns identified      Tobacco Use: No change in amount of tobacco use since last session.  Patient declined discussion at this time    Diagnosis:  1. Generalized anxiety disorder    2. PTSD (post-traumatic stress disorder)    3. Severe episode of recurrent major depressive disorder, without psychotic features (H)            Collateral Reports Completed:   Not Applicable    PLAN: (Patient Tasks / Therapist Tasks / Other)  Continue to utilize self care and stress reduction skills daily.             Ricarda Bhatia [facility]                                                         ______________________________________________________________________    Individual Treatment Plan    Patient's Name: Radha MASSEY  Amena  YOB: 1975    Date of Creation: 6/28/23  Date Treatment Plan Last Reviewed/Revised: 5/5/25    DSM5 Diagnoses: 296.32 (F33.1) Major Depressive Disorder, Recurrent Episode, Moderate _  Psychosocial / Contextual Factors: living with boyfriend, memory issues  PROMIS (reviewed every 90 days):     Referral / Collaboration:  Referral to another professional/service is not indicated at this time..    Anticipated number of session for this episode of care: 9-12 sessions  Anticipation frequency of session: as needed  Anticipated Duration of each session: 38-52 minutes  Treatment plan will be reviewed in 90 days or when goals have been changed.       MeasurableTreatment Goal(s) related to diagnosis / functional impairment(s)  Goal 1: Patient will increase communication skills with family members.    Objective #A (Patient Action)    Patient will learn & utilize at least 2 assertive communication skills weekly.  Status: Continued - Date(s): 5/5/25    Intervention(s)  Therapist will teach emotional regulation skills. distress tolerance skills, interpersonal effectiveness skills, emotion regluation skills, mindfulness skills, radical acceptance. Therapist will teach client how to ID body cues for anxiety, anxiety reduction techniques, how to ID triggers for depression and anxiety- decrease reactivity/eliminate, lifestyle changes to reduce depression and anxiety, communication skills, explore cognitive beliefs and help client to develop healthy cognitive patterns and beliefs.    Objective #B  Patient will use thought-stopping strategy daily to reduce intrusive thoughts.  Status: Continued - Date(s): 5/5/25    Intervention(s)  Therapist will teach emotional regulation skills. distress tolerance skills, interpersonal effectiveness skills, emotion regluation skills, mindfulness skills, radical acceptance. Therapist will teach client how to ID body cues for anxiety, anxiety reduction techniques, how to ID  triggers for depression and anxiety- decrease reactivity/eliminate, lifestyle changes to reduce depression and anxiety, communication skills, explore cognitive beliefs and help client to develop healthy cognitive patterns and beliefs.      Goal 2: Patient will decrease depression symptoms.      Objective #A (Patient Action)    Status: Continued - Date(s): 5/5/25    Patient will Increase interest, engagement, and pleasure in doing things  Decrease frequency and intensity of feeling down, depressed, hopeless  Improve quantity and quality of night time sleep / decrease daytime naps  Feel less tired and more energy during the day   Improve diet, appetite, mindful eating, and / or meal planning  Identify negative self-talk and behaviors: challenge core beliefs, myths, and actions  Improve concentration, focus, and mindfulness in daily activities   Feel less fidgety, restless or slow in daily activities / interpersonal interactions.    Intervention(s)  Therapist will teach emotional regulation skills. distress tolerance skills, interpersonal effectiveness skills, emotion regluation skills, mindfulness skills, radical acceptance. Therapist will teach client how to ID body cues for anxiety, anxiety reduction techniques, how to ID triggers for depression and anxiety- decrease reactivity/eliminate, lifestyle changes to reduce depression and anxiety, communication skills, explore cognitive beliefs and help client to develop healthy cognitive patterns and beliefs.    Objective #B  Patient will identify three distraction and diversion activities and use those activities to decrease level of anxiety  .    Status: Continued - Date(s):  5/5/25    Intervention(s)  Therapist will teach emotional regulation skills. distress tolerance skills, interpersonal effectiveness skills, emotion regluation skills, mindfulness skills, radical acceptance. Therapist will teach client how to ID body cues for anxiety, anxiety reduction techniques,  how to ID triggers for depression and anxiety- decrease reactivity/eliminate, lifestyle changes to reduce depression and anxiety, communication skills, explore cognitive beliefs and help client to develop healthy cognitive patterns and beliefs.      Patient has reviewed and agreed to the above plan.      Ricarda Bhatia [facility]

## 2025-05-13 NOTE — PROGRESS NOTES
Video-Visit Details    Type of service:  Video Visit     Originating Location (pt. Location): Home    Distant Location (provider location):  On-site  Platform used for Video Visit: [facility]     Date of visit: 5/15/2025      Assessment/Plan:   Radha Beckwith is a 49 year old female with a past medical history significant for chronic bilateral low back pain, depression, SVT, opioid dependence in remission, anxiety, tobacco use, GERD, restless legs who presents in follow up for:     Low back pain - Onset of pain within last 2 years. Pain is mostly axial, though she does have intermittent radicular leg symptoms, occasionally extends below the knee. Etiology is likely associated with multiple factors, including underlying degenerative changes of lumbar spine, facet and SIJ mediated components, with prominent overlying myofascial component.      Neck/thoracic back pain - Onset of pain within last 2 years. On exam, positive for myofascial TTP, most notably in lower thoracic paraspinals. Etiology is likely associated with multiple factors, including underlying degenerative changes, with prominent overlying myofascial component.     Today, Linette is seen virtually via video. Orphenadrine trial recommended at last visit, caused s/e. She had pain flare in between visits, was given Flexeril, which helps, and I refilled recently for 5-10 mg BID PRN. She tried leftover baclofen she had at home, which she may continue 10-20 mg BID PRN, alternating with Flexeril. Her low back pain has returned to baseline following SI joint injections in January 2025 and interested in pursuing repeat. Posterior neck pain has persisted since RFA in December 2024, describes hypersensitivity to light touch. Unclear etiology, possible central sensitization versus neuritis versus other cause (?). We discussed plan to follow up in clinic after repeat SI joint injections, to further eval neck pain and perform PE.       Assigned to [facility] nursing team.     Visit Diagnoses:  1. SI (sacroiliac) joint dysfunction    2. Chronic neck pain    3. Myofascial pain    4. Muscle spasm    5. Multiple joint pain          Diagnosis reviewed, treatment option addressed, and risk/benifits discussed.  Self-care instructions given.  I am recommending a multidisciplinary treatment plan to help this patient better manage their pain.      Pain Physical Therapy:  Prior PT for low back/SI and neck pain. Continues home exercise program.      Pain Psychology: Previously worked with Karen Kohler PhD, last visit on 9/30/24.      Diagnostic Studies:  Rheum labs ordered at last visit, all WNL.      Medication Management:   Hyoscyamine 0.125 mg twice daily as needed for abdominal pain. Has been helpful for her, no side effects. Uses consistently twice daily.   Duloxetine -  Current dose 60 mg twice daily. No reported side effects. Will reassess benefit at future follow up.   Muscle relaxants:   Orphenadrine caused s/e. Was given cyclobenzaprine by other provider, helpful with use at bedtime.   Refilled in between visits for 5-10 mg BID PRN  Resumed baclofen using leftover supply at home. May increase dose to 10-20 mg BID PRN  Advised to allow 4-6 hours in between all muscle relaxant doses.   [place] - [place]. No clear benefit for pain with daytime products, some benefit at night with products used at bedtime. Could consider re-certification in future if interested.      Potential procedures:   Bilateral SI joint injections completed on 1/16/25. 50% pain relief for 2 months. Low back pain returned to baseline, would like to repeat injection, orders placed.   Bilateral cervical 3,4,5 medial branch nerve RFA on 12/17/24. Increased pain, endorses hypersensitivity to light touch.      Other Therapies/Orders/Referrals:   None     Follow up with JEANNIE Schroeder CNP 4 weeks after SI joint injection, in  person visit recommended.       Review of Electronic Chart: Today I have also reviewed available medical information in the patient's medical record at [facility] ([facility]) and Care Everywhere (if available), including relevant provider notes, laboratory work, and imaging.     Jenny Landrum, NAOMI, APRN, AGNP-C  [facility]     -------------------------------------------------    Subjective:    Chief complaint: No chief complaint on file.      Interval history:  Radha Beckwith is a 49 year old female last seen on 2/12/25.      Recommendations/plan at the last visit included:  Pain Physical Therapy:  Prior PT for low back/SI and neck pain. Continues home exercise program.      Pain Psychology: Previously worked with Karen Kohler PhD, last visit on 9/30/24.      Diagnostic Studies:  Rheum labs ordered today for further workup of widespread pain symptoms, myalgia and polyarthralgia.      Medication Management:   Hyoscyamine 0.125 mg twice daily as needed for abdominal pain. Has been helpful for her, no side effects. Uses consistently twice daily.   Duloxetine -  Current dose 60 mg twice daily. No reported side effects. Will reassess benefit at follow up.   Muscle relaxants:   Will taper off baclofen before starting new muscle relaxant. Currently takes 20 mg BID. Recommend 20 mg daily x 5 days, then off.   Orphenadrine 100 mg BID PRN prescribed today. Appreciated benefit when given in ED. Cautioned about potential CNS side effects.   [place]. No clear benefit for pain with daytime products, some benefit at night with products used at bedtime. Could consider re-certification in future if interested.      Potential procedures:   Bilateral SI joint injections completed on 1/16/25. Will reassess benefit at follow up.   Bilateral cervical 3,4,5 medial branch nerve RFA on 12/17/24. Will reassess benefit at follow up.   Abdominal wall  trigger point injections recommended by Dr. Dozier (OV on 11/11/24), completed on 11/25/24. Marginal benefit.      Other Therapies/Orders/Referrals:   Labs may be completed at any [facility] location      Follow up with JEANNIE Schroeder CNP around 6-8 weeks     Since her last visit, Radha MASSEY Amena reports:  -She is having trouble with cell phone connection right now. Issues with [employer].   -Clinic staff was unable to reach her this morning via telephone.   -The visit was initially scheduled in person, then converted to video visit.   -She has increased pain with longer car rides.   -Last SI joint injections 1/16/25. Thinks this may have been somewhat helpful. Though increased low back pain in the past 2-2.5 months. She thinks she had up to 50% pain relief for about 2 months.   -She was recently dx with TMJ.   -She is having more neck pain since cervical RFA in December 2024. She is having hypersensitivity to light touch.   -She is going to chiropractor again weekly. They have targeted SI joint to an extent.   -had side effects with orphenadrine. Another provider prescribed Flexeril.   -She tried baclofen again with leftover supply. Not sure how effective, but this was at lower dose.       PEG: A Three-Item Scale Assessing Pain Intensity and Interference    What number best describes your PAIN ON AVERAGE in the past week? (Patient-Rptd) 7    What number best describes how, during the past week, pain has interfered with your ENJOYMENT OF LIFE? (Patient-Rptd) 9    What number best describes how, during the past week, pain has interfered with your GENERAL ACTIVITY? (Patient-Rptd) 9    PEG Total Score: (Patient-Rptd) 8.33    Fabian VERA, Hayden KA, Karie BEAVERS, Anjana TA, Henri J, Joyce JM, Ban SM, Brandie K. Development and initial validation of the PEG, a 3-item scale assessing pain intensity and interference. Journal of General Internal Medicine. 2009 [place];24:733-738.        HPI/Interval Hx from last visit:  She would  like to address the following:   -Alternative muscle relaxant, orphenadrine. Currently taking Flexeril and baclofen.   -She does not think baclofen is working. Baclofen 20 mg BID consistently. She would like to taper off and trial orphenadrine.   -Hyoscyamine has been helpful, takes twice daily. Stomach cramping has improved with this and would like to continue.   -She recently had spinal MRIs, some changes to thoracic spine.   -She has hx of elevated CRP, mildly but persistent.   -She has not had labs for rheum workup.       Current Pain Treatments:                  Suboxone 2-0.5 mg daily (weaning off)              Ropinirole 1 mg at bedtime               Hyoscyamine 0.125 mg BID PRN              Duloxetine 60 mg BID    Flexeril PRN  Baclofen PRN                 TENS              PT - HEP              CMBB/RFA on 12/17/24              Bilateral SI joint injections on 1/16/25 - repeat ordered today         Current MME: N/A     Review of [place] Prescription Monitoring Program (): YES      Past pain treatments:  SI joint injection 5/16/23 -significant benefit x1 month, then diminishing efficacy  KINDRA on 4/19/24 with Dr. Ivory - marginal benefit   Gabapentin   Nortriptyline 10-20 mg at bedtime  Orphenadrine     Medications:  Current Outpatient Medications   Medication Sig Dispense Refill    baclofen (LIORESAL) 10 MG tablet Take 1-2 tablets (10-20 mg) by mouth 2 times daily as needed for muscle spasms. Allow 4-6 hours in between all muscle relaxants. 90 tablet 1    cyclobenzaprine (FLEXERIL) 5 MG tablet Take 1-2 tablets (5-10 mg) by mouth 2 times daily as needed for muscle spasms.      acetaminophen (TYLENOL) 500 MG tablet Take 1,000 mg by mouth every 6 hours as needed for mild pain      ALPRAZolam (XANAX) 0.5 MG tablet  (Patient not taking: Reported on 5/9/2025)      benzocaine (AMERICAINE) 20 % rectal ointment Place rectally every 3 hours as needed for hemorrhoids. 28 g 1    bisacodyl (DULCOLAX) 10 MG  suppository Place 1 suppository (10 mg) rectally daily as needed for constipation. 30 suppository 1    bisacodyl (DULCOLAX) 5 MG EC tablet Take 1 tablet (5 mg) by mouth every other day. Take every other day. If no bowel movement for 2 or more days, take 2 tablets of bisacodyl (10 mg) 30 tablet 2    buprenorphine HCl-naloxone HCl (SUBOXONE) 2-0.5 MG per film Place 0.5 Film under the tongue daily       busPIRone HCl (BUSPAR) 30 MG tablet 30 mg 3 times daily      diclofenac (VOLTAREN) 1 % topical gel Apply 4 g topically 4 times daily. 350 g 2    DULoxetine (CYMBALTA) 60 MG capsule TAKE ONE CAPSULE BY MOUTH TWICE A  capsule 1    famotidine (PEPCID) 40 MG tablet Take 1 tablet (40 mg) by mouth 2 times daily. 60 tablet 0    hydrocortisone, Perianal, (HYDROCORTISONE) 2.5 % cream Place rectally 2 times daily as needed for hemorrhoids. 30 g 3    hyoscyamine (LEVSIN) 0.125 MG tablet Take 1 tablet (125 mcg) by mouth every 6 hours as needed for cramping. 60 tablet 5    lactulose (CONSTULOSE) 10 GM/15ML solution Take 15 mLs (10 g) by mouth 3 times daily as needed for constipation ((as needed for severe constipation, no BM for more than 3 days and feeling constipated).). 300 mL 3    linaclotide (LINZESS) 290 MCG capsule Take 1 capsule (290 mcg) by mouth every morning (before breakfast). 30 capsule 11    medical cannabis (Patient's own supply) See Admin Instructions. Has [place] Medical [place] Card- Purchases through [facility].   (The purpose of this order is to document that the patient reports taking medical cannabis.  This is not a prescription, and is not used to certify that the patient has a qualifying medical condition.) (Patient not taking: Reported on 5/9/2025)      methylPREDNISolone (MEDROL DOSEPAK) 4 MG tablet therapy pack Follow Package Directions 21 tablet 0    ondansetron (ZOFRAN ODT) 4 MG ODT tab DISSOLVE ONE TABLET BY MOUTH EVERY 6 HOURS AS NEEDED FOR NAUSEA (Patient not taking: Reported on  5/9/2025) 20 tablet 3    pantoprazole (PROTONIX) 40 MG EC tablet Take 1 tablet (40 mg) by mouth 2 times daily (with meals). (Patient not taking: Reported on 5/9/2025) 180 tablet 2    rOPINIRole (REQUIP) 1 MG tablet TAKE ONE TABLET BY MOUTH EVERY NIGHT AT BEDTIME 90 tablet 0    simvastatin (ZOCOR) 10 MG tablet TAKE ONE TABLET BY MOUTH EVERY NIGHT AT BEDTIME 90 tablet 1    temazepam (RESTORIL) 15 MG capsule          Medical History: any changes in medical history since they were last seen? No      Objective:    Physical Exam:  not currently breastfeeding.  GENERAL: alert and no distress  EYES: Eyes grossly normal to inspection.  No discharge or erythema, or obvious scleral/conjunctival abnormalities.  RESP: No audible wheeze, cough, or visible cyanosis.    SKIN: Visible skin clear. No significant rash, abnormal pigmentation or lesions.  NEURO: Cranial nerves grossly intact.  Mentation and speech appropriate for age.  PSYCH: Appropriate affect, tone, and pace of words      Diagnostic Tests/Imaging/Labs:  See above     BILLING TIME DOCUMENTATION:   The total TIME spent on this patient on the date of the encounter/appointment was 27 minutes.      TOTAL TIME includes:   Time spent preparing to see the patient (reviewing records and tests)   Time spent face to face (or over the phone) with the patient   Time spent ordering tests, medications, procedures and referrals   Time spent Referring and communicating with other healthcare professionals   Time spent documenting clinical information in Epic

## 2025-05-15 ENCOUNTER — VIRTUAL VISIT (OUTPATIENT)
Dept: PALLIATIVE MEDICINE | Facility: CLINIC | Age: 50
End: 2025-05-15
Payer: COMMERCIAL

## 2025-05-15 DIAGNOSIS — M79.18 MYOFASCIAL PAIN: ICD-10-CM

## 2025-05-15 DIAGNOSIS — M54.2 CHRONIC NECK PAIN: ICD-10-CM

## 2025-05-15 DIAGNOSIS — M53.3 SI (SACROILIAC) JOINT DYSFUNCTION: Primary | ICD-10-CM

## 2025-05-15 DIAGNOSIS — M25.50 MULTIPLE JOINT PAIN: ICD-10-CM

## 2025-05-15 DIAGNOSIS — G89.29 CHRONIC NECK PAIN: ICD-10-CM

## 2025-05-15 DIAGNOSIS — M62.838 MUSCLE SPASM: ICD-10-CM

## 2025-05-15 PROCEDURE — 98006 SYNCH AUDIO-VIDEO EST MOD 30: CPT

## 2025-05-15 RX ORDER — CYCLOBENZAPRINE HCL 5 MG
5-10 TABLET ORAL 2 TIMES DAILY PRN
COMMUNITY
Start: 2025-05-15

## 2025-05-15 RX ORDER — BACLOFEN 10 MG/1
10-20 TABLET ORAL 2 TIMES DAILY PRN
Qty: 90 TABLET | Refills: 1 | Status: SHIPPED | OUTPATIENT
Start: 2025-05-15

## 2025-05-15 ASSESSMENT — PAIN SCALES - PAIN ENJOYMENT GENERAL ACTIVITY SCALE (PEG)
PEG_TOTALSCORE: 8.33
INTERFERED_GENERAL_ACTIVITY: 9
AVG_PAIN_PASTWEEK: 7
INTERFERED_GENERAL_ACTIVITY: 9
PEG_TOTALSCORE: 8.33
INTERFERED_ENJOYMENT_LIFE: 9
INTERFERED_ENJOYMENT_LIFE: 9
AVG_PAIN_PASTWEEK: 7

## 2025-05-19 ENCOUNTER — VIRTUAL VISIT (OUTPATIENT)
Dept: PSYCHOLOGY | Facility: CLINIC | Age: 50
End: 2025-05-19
Payer: COMMERCIAL

## 2025-05-19 DIAGNOSIS — F43.10 PTSD (POST-TRAUMATIC STRESS DISORDER): ICD-10-CM

## 2025-05-19 DIAGNOSIS — F33.2 SEVERE EPISODE OF RECURRENT MAJOR DEPRESSIVE DISORDER, WITHOUT PSYCHOTIC FEATURES (H): ICD-10-CM

## 2025-05-19 DIAGNOSIS — F11.21 OPIOID DEPENDENCE IN REMISSION (H): Primary | ICD-10-CM

## 2025-05-19 DIAGNOSIS — F41.1 GENERALIZED ANXIETY DISORDER: Primary | ICD-10-CM

## 2025-05-19 PROCEDURE — 90834 PSYTX W PT 45 MINUTES: CPT | Mod: 95 | Performed by: COUNSELOR

## 2025-05-19 NOTE — PROGRESS NOTES
Answers submitted by the patient for this visit:  Patient Health Questionnaire (Submitted on 5/12/2025)  If you checked off any problems, how difficult have these problems made it for you to do your work, take care of things at home, or get along with other people?: Extremely difficult  PHQ9 TOTAL SCORE: 22  Patient Health Questionnaire (G7) (Submitted on 5/5/2025)  BO 7 TOTAL SCORE: 19        [facility]                                     Progress Note    Patient Name: Radha Beckwith  Date: 5/19/25         Service Type: Individual      Session Start Time: 2:30pm Session End Time: 3:20pm     Session Length:  50    Session #: 79    Attendees: Client    Service Modality:  video      Provider verified identity through the following two step process.  Patient provided:  Patient is known previously to provider    Telemedicine Visit: The patient's condition can be safely assessed and treated via synchronous audio and visual telemedicine encounter.      Reason for Telemedicine Visit: Patient convenience (e.g. access to timely appointments / distance to available provider)    Originating Site (Patient Location): Patient's home    Distant Site (Provider Location): Provider Remote Setting- Home Office    Consent:  The patient/guardian has verbally consented to: the potential risks and benefits of telemedicine (video visit) versus in person care; bill my insurance or make self-payment for services provided; and responsibility for payment of non-covered services.     Patient would like the video invitation sent by:  My Chart    Mode of Communication:  telephone- client did not have access to wi/fi to be able to do video session    Distant Location (Provider):  Off-site home office    As the provider I attest to compliance with applicable laws and regulations related to telemedicine.    There has been demonstrated improvement in functioning while patient has been engaged in psychotherapy/psychological  service- if withdrawn the patient would deteriorate and/or relapse.      DATA  Interactive Complexity: No  Crisis: No        Progress Since Last Session (Related to Symptoms / Goals / Homework):   Symptoms: No change .    Homework: Completed in session      Episode of Care Goals: Satisfactory progress - MAINTENANCE (Working to maintain change, with risk of relapse); Intervened by continuing to positively reinforce healthy behavior choice      Current / Ongoing Stressors and Concerns:   Stated she has been getting bites on her body and believes they are some kinds of mites from her chickens.   Her son is coming home in June for a friend's wedding. She might not be able to see him because of how busy he will be.   Her court date for ss[place] is at the end of this month.   Stated her anxiety has been high and this morning was shaking which feels like she hasn't had this before. Has been feeling depressed lately. Had so much more pain lately. Will be getting injections in her SI joints.        Treatment Objective(s) Addressed in This Session:   Increase interest, engagement, and pleasure in doing things  Feel less tired and more energy during the day        Intervention:   Discussed some relationship issues. Continued to encourage client to talk with him about her feelings. Client talked about her medications and how she feels like they  might be making her gain some weight. Discussed diet and nutrition and client knows which changes she needs to make just struggles to implement those changes. Gave client some resources to look into that could help with this.     Assessments completed prior to visit:  The following assessments were completed by patient for this visit:  PHQ9:       2/25/2025     6:49 PM 3/3/2025     9:02 AM 3/10/2025     2:30 PM 3/17/2025    11:36 AM 4/21/2025     1:54 PM 5/5/2025    11:49 AM 5/12/2025     2:03 PM   PHQ-9 SCORE   PHQ-9 Total Score MyChart 24 (Severe depression) 24 (Severe depression) 22  (Severe depression) 23 (Severe depression) 24 (Severe depression) 24 (Severe depression) 22 (Severe depression)   PHQ-9 Total Score 24  24  22  23  24  24  22        Patient-reported     GAD7:       12/16/2024     1:05 PM 1/6/2025     3:30 PM 2/5/2025     7:35 PM 2/24/2025    12:03 PM 3/17/2025    11:37 AM 4/21/2025     1:55 PM 5/5/2025    11:50 AM   BO-7 SCORE   Total Score 18 (severe anxiety) 21 (severe anxiety) 20 (severe anxiety) 20 (severe anxiety) 20 (severe anxiety) 20 (severe anxiety) 19 (severe anxiety)   Total Score 18  21  20  20  20  20  19        Patient-reported     PROMIS 10-Global Health (all questions and answers displayed):       8/10/2024     2:51 PM 8/18/2024    11:36 AM 8/28/2024     2:07 PM 12/1/2024     4:43 PM 12/16/2024     1:07 PM 1/6/2025     3:31 PM 2/24/2025    12:00 PM   PROMIS 10   In general, would you say your health is: Fair Poor Poor Poor Poor Poor Poor   In general, would you say your quality of life is: Poor Fair Fair Poor Poor Poor Poor   In general, how would you rate your physical health? Poor Poor Fair Poor Poor Fair Poor   In general, how would you rate your mental health, including your mood and your ability to think? Poor Poor Poor Poor Poor Poor Poor   In general, how would you rate your satisfaction with your social activities and relationships? Poor Fair Fair Poor Fair Fair Poor   In general, please rate how well you carry out your usual social activities and roles Poor Fair Fair Poor Poor Poor Poor   To what extent are you able to carry out your everyday physical activities such as walking, climbing stairs, carrying groceries, or moving a chair? A little A little A little A little A little A little A little   In the past 7 days, how often have you been bothered by emotional problems such as feeling anxious, depressed, or irritable? Always Always Always Always Always Always Always   In the past 7 days, how would you rate your fatigue on average? Very severe Very severe  Severe Severe Very severe Very severe Severe   In the past 7 days, how would you rate your pain on average, where 0 means no pain, and 10 means worst imaginable pain? 7 7 7 8 7 8 7   In general, would you say your health is: 2 1 1 1 1 1 1   In general, would you say your quality of life is: 1 2 2 1 1 1 1   In general, how would you rate your physical health? 1 1 2 1 1 2 1   In general, how would you rate your mental health, including your mood and your ability to think? 1 1 1 1 1 1 1   In general, how would you rate your satisfaction with your social activities and relationships? 1 2 2 1 2 2 1   In general, please rate how well you carry out your usual social activities and roles. (This includes activities at home, at work and in your community, and responsibilities as a parent, child, spouse, employee, friend, etc.) 1 2 2 1 1 1 1   To what extent are you able to carry out your everyday physical activities such as walking, climbing stairs, carrying groceries, or moving a chair? 2 2 2 2 2 2 2   In the past 7 days, how often have you been bothered by emotional problems such as feeling anxious, depressed, or irritable? 5 5 5 5 5 5 5   In the past 7 days, how would you rate your fatigue on average? 5 5 4 4 5 5 4   In the past 7 days, how would you rate your pain on average, where 0 means no pain, and 10 means worst imaginable pain? 7 7 7 8 7 8 7   Global Mental Health Score 4    4 6 6 4  5  5  4    Global Physical Health Score 6    6 6 8 7  6  7  7    PROMIS TOTAL - SUBSCORES 10    10 12 14 11  11  12  11        Patient-reported     [place] Suicide Severity Rating Scale (Lifetime/Recent)      8/31/2024    12:07 PM 9/5/2024     5:33 PM 9/13/2024    10:52 AM 10/4/2024     4:54 PM 11/22/2024    11:34 AM 12/25/2024    11:30 PM 1/28/2025     1:23 PM   [place] Suicide Severity Rating (Lifetime/Recent)   Q1 Wished to be Dead (Past Month) 0-->no 0-->no 0-->no 0-->no 0-->no 0-->no 0-->no   Q2 Suicidal Thoughts (Past Month)  0-->no 0-->no 0-->no 0-->no 0-->no 0-->no 0-->no   Q6 Suicide Behavior (Lifetime) 0-->no 1-->yes 0-->no 0-->no 0-->no 0-->no 0-->no   If yes to Q6, within past 3 months?  0-->no        Level of Risk per Screen no risks indicated moderate risk no risks indicated no risks indicated no risks indicated no risks indicated no risks indicated         ASSESSMENT: Current Emotional / Mental Status (status of significant symptoms):   Risk status (Self / Other harm or suicidal ideation)   Patient denies current fears or concerns for personal safety.   Patient denies current or recent suicidal ideation or behaviors.   Patient denies current or recent homicidal ideation or behaviors.   Patient denies current or recent self injurious behavior or ideation.   Patient denies other safety concerns.   Patient reports there has been no change in risk factors since their last session.     Patient reports there has been no change in protective factors since their last session.     Recommended that patient call 911 or go to the local ED should there be a change in any of these risk factors.     Appearance:   Appropriate    Eye Contact:   Good    Psychomotor Behavior: Normal    Attitude:   Cooperative    Orientation:   All   Speech    Rate / Production: Normal/ Responsive Normal     Volume:  Normal    Mood:    Normal   Affect:    Appropriate    Thought Content:  Clear    Thought Form:  Coherent    Insight:    Good      Medication Review:   No changes to current psychiatric medication(s)     Medication Compliance:   Yes     Changes in Health Issues:   None reported     Chemical Use Review:   Substance Use: Chemical use reviewed, no active concerns identified      Tobacco Use: No change in amount of tobacco use since last session.  Patient declined discussion at this time    Diagnosis:  1. Generalized anxiety disorder    2. PTSD (post-traumatic stress disorder)    3. Severe episode of recurrent major depressive disorder, without psychotic  features (H)            Collateral Reports Completed:   Not Applicable    PLAN: (Patient Tasks / Therapist Tasks / Other)  Continue to utilize self care and stress reduction skills daily.   Look into nutrition resources.            Ricarda Bhatia, [facility]                                                         ______________________________________________________________________    Individual Treatment Plan    Patient's Name: Radha Beckwith  YOB: 1975    Date of Creation: 6/28/23  Date Treatment Plan Last Reviewed/Revised: 5/5/25    DSM5 Diagnoses: 296.32 (F33.1) Major Depressive Disorder, Recurrent Episode, Moderate _  Psychosocial / Contextual Factors: living with boyfriend, memory issues  PROMIS (reviewed every 90 days):     Referral / Collaboration:  Referral to another professional/service is not indicated at this time..    Anticipated number of session for this episode of care: 9-12 sessions  Anticipation frequency of session: as needed  Anticipated Duration of each session: 38-52 minutes  Treatment plan will be reviewed in 90 days or when goals have been changed.       MeasurableTreatment Goal(s) related to diagnosis / functional impairment(s)  Goal 1: Patient will increase communication skills with family members.    Objective #A (Patient Action)    Patient will learn & utilize at least 2 assertive communication skills weekly.  Status: Continued - Date(s): 5/5/25    Intervention(s)  Therapist will teach emotional regulation skills. distress tolerance skills, interpersonal effectiveness skills, emotion regluation skills, mindfulness skills, radical acceptance. Therapist will teach client how to ID body cues for anxiety, anxiety reduction techniques, how to ID triggers for depression and anxiety- decrease reactivity/eliminate, lifestyle changes to reduce depression and anxiety, communication skills, explore cognitive beliefs and help client to develop healthy cognitive patterns and  beliefs.    Objective #B  Patient will use thought-stopping strategy daily to reduce intrusive thoughts.  Status: Continued - Date(s): 5/5/25    Intervention(s)  Therapist will teach emotional regulation skills. distress tolerance skills, interpersonal effectiveness skills, emotion regluation skills, mindfulness skills, radical acceptance. Therapist will teach client how to ID body cues for anxiety, anxiety reduction techniques, how to ID triggers for depression and anxiety- decrease reactivity/eliminate, lifestyle changes to reduce depression and anxiety, communication skills, explore cognitive beliefs and help client to develop healthy cognitive patterns and beliefs.      Goal 2: Patient will decrease depression symptoms.      Objective #A (Patient Action)    Status: Continued - Date(s): 5/5/25    Patient will Increase interest, engagement, and pleasure in doing things  Decrease frequency and intensity of feeling down, depressed, hopeless  Improve quantity and quality of night time sleep / decrease daytime naps  Feel less tired and more energy during the day   Improve diet, appetite, mindful eating, and / or meal planning  Identify negative self-talk and behaviors: challenge core beliefs, myths, and actions  Improve concentration, focus, and mindfulness in daily activities   Feel less fidgety, restless or slow in daily activities / interpersonal interactions.    Intervention(s)  Therapist will teach emotional regulation skills. distress tolerance skills, interpersonal effectiveness skills, emotion regluation skills, mindfulness skills, radical acceptance. Therapist will teach client how to ID body cues for anxiety, anxiety reduction techniques, how to ID triggers for depression and anxiety- decrease reactivity/eliminate, lifestyle changes to reduce depression and anxiety, communication skills, explore cognitive beliefs and help client to develop healthy cognitive patterns and beliefs.    Objective #B  Patient will  identify three distraction and diversion activities and use those activities to decrease level of anxiety  .    Status: Continued - Date(s):  5/5/25    Intervention(s)  Therapist will teach emotional regulation skills. distress tolerance skills, interpersonal effectiveness skills, emotion regluation skills, mindfulness skills, radical acceptance. Therapist will teach client how to ID body cues for anxiety, anxiety reduction techniques, how to ID triggers for depression and anxiety- decrease reactivity/eliminate, lifestyle changes to reduce depression and anxiety, communication skills, explore cognitive beliefs and help client to develop healthy cognitive patterns and beliefs.      Patient has reviewed and agreed to the above plan.      Ricarda Bhatia [facility]

## 2025-05-27 ENCOUNTER — VIRTUAL VISIT (OUTPATIENT)
Dept: PSYCHOLOGY | Facility: OTHER | Age: 50
End: 2025-05-27
Payer: COMMERCIAL

## 2025-05-27 DIAGNOSIS — F43.10 PTSD (POST-TRAUMATIC STRESS DISORDER): ICD-10-CM

## 2025-05-27 DIAGNOSIS — F33.2 SEVERE EPISODE OF RECURRENT MAJOR DEPRESSIVE DISORDER, WITHOUT PSYCHOTIC FEATURES (H): ICD-10-CM

## 2025-05-27 DIAGNOSIS — F41.1 GENERALIZED ANXIETY DISORDER: Primary | ICD-10-CM

## 2025-05-27 PROCEDURE — 90834 PSYTX W PT 45 MINUTES: CPT | Mod: 95 | Performed by: COUNSELOR

## 2025-05-27 NOTE — PATIENT INSTRUCTIONS
Order placed for repeat bilateral SI joint injections  Take baclofen 10-20 mg twice daily as needed. You may continue using cyclobenzaprine in evening/bedtime if helpful. Allow 4-6 hours in between doses.   Follow up 4 weeks after SI joint injections. Recommend in person visit.

## 2025-05-27 NOTE — PROGRESS NOTES
[facility]                                     Progress Note    Patient Name: Radha Beckwith  Date: 5/27/25         Service Type: Individual      Session Start Time: 2:00pm Session End Time: 2:50pm     Session Length:  50    Session #: 80    Attendees: Client    Service Modality:  video      Provider verified identity through the following two step process.  Patient provided:  Patient is known previously to provider    Telemedicine Visit: The patient's condition can be safely assessed and treated via synchronous audio and visual telemedicine encounter.      Reason for Telemedicine Visit: Patient convenience (e.g. access to timely appointments / distance to available provider)    Originating Site (Patient Location): Patient's home    Distant Site (Provider Location): Provider Remote Setting- Home Office    Consent:  The patient/guardian has verbally consented to: the potential risks and benefits of telemedicine (video visit) versus in person care; bill my insurance or make self-payment for services provided; and responsibility for payment of non-covered services.     Patient would like the video invitation sent by:  My Chart    Mode of Communication:  telephone- client did not have access to wi/fi to be able to do video session    Distant Location (Provider):  Off-site home office    As the provider I attest to compliance with applicable laws and regulations related to telemedicine.    There has been demonstrated improvement in functioning while patient has been engaged in psychotherapy/psychological service- if withdrawn the patient would deteriorate and/or relapse.      DATA  Interactive Complexity: No  Crisis: No        Progress Since Last Session (Related to Symptoms / Goals / Homework):   Symptoms: No change .    Homework: Completed in session      Episode of Care Goals: Satisfactory progress - MAINTENANCE (Working to maintain change, with risk of relapse); Intervened by continuing  to positively reinforce healthy behavior choice      Current / Ongoing Stressors and Concerns:   Stated she is doing fairly well today.   Stated she and her boyfriend had a big argument on Friday.   Feels like her new ADHD medication might be making her dizzy and almost faint.        Treatment Objective(s) Addressed in This Session:   Increase interest, engagement, and pleasure in doing things  Feel less tired and more energy during the day        Intervention:   Discussed relationship issues and encouraged client to try to talk to her partner about her concerns. Encouraged client to check in with her psychiatrists about the dizziness she's experiencing. Worked on addressing limiting beliefs and coping skills.     Assessments completed prior to visit:  The following assessments were completed by patient for this visit:  PHQ9:       2/25/2025     6:49 PM 3/3/2025     9:02 AM 3/10/2025     2:30 PM 3/17/2025    11:36 AM 4/21/2025     1:54 PM 5/5/2025    11:49 AM 5/12/2025     2:03 PM   PHQ-9 SCORE   PHQ-9 Total Score MyChart 24 (Severe depression) 24 (Severe depression) 22 (Severe depression) 23 (Severe depression) 24 (Severe depression) 24 (Severe depression) 22 (Severe depression)   PHQ-9 Total Score 24  24  22  23  24  24  22        Patient-reported     GAD7:       12/16/2024     1:05 PM 1/6/2025     3:30 PM 2/5/2025     7:35 PM 2/24/2025    12:03 PM 3/17/2025    11:37 AM 4/21/2025     1:55 PM 5/5/2025    11:50 AM   BO-7 SCORE   Total Score 18 (severe anxiety) 21 (severe anxiety) 20 (severe anxiety) 20 (severe anxiety) 20 (severe anxiety) 20 (severe anxiety) 19 (severe anxiety)   Total Score 18  21  20  20  20  20  19        Patient-reported     PROMIS 10-Global Health (all questions and answers displayed):       8/18/2024    11:36 AM 8/28/2024     2:07 PM 12/1/2024     4:43 PM 12/16/2024     1:07 PM 1/6/2025     3:31 PM 2/24/2025    12:00 PM 5/27/2025     1:59 PM   PROMIS 10   In general, would you say your health  is: Poor Poor Poor Poor Poor Poor Fair   In general, would you say your quality of life is: Fair Fair Poor Poor Poor Poor Fair   In general, how would you rate your physical health? Poor Fair Poor Poor Fair Poor Poor   In general, how would you rate your mental health, including your mood and your ability to think? Poor Poor Poor Poor Poor Poor Poor   In general, how would you rate your satisfaction with your social activities and relationships? Fair Fair Poor Fair Fair Poor Poor   In general, please rate how well you carry out your usual social activities and roles Fair Fair Poor Poor Poor Poor Poor   To what extent are you able to carry out your everyday physical activities such as walking, climbing stairs, carrying groceries, or moving a chair? A little A little A little A little A little A little A little   In the past 7 days, how often have you been bothered by emotional problems such as feeling anxious, depressed, or irritable? Always Always Always Always Always Always Often   In the past 7 days, how would you rate your fatigue on average? Very severe Severe Severe Very severe Very severe Severe Severe   In the past 7 days, how would you rate your pain on average, where 0 means no pain, and 10 means worst imaginable pain? 7 7 8 7 8 7 8   In general, would you say your health is: 1 1 1 1 1 1 2   In general, would you say your quality of life is: 2 2 1 1 1 1 2   In general, how would you rate your physical health? 1 2 1 1 2 1 1   In general, how would you rate your mental health, including your mood and your ability to think? 1 1 1 1 1 1 1   In general, how would you rate your satisfaction with your social activities and relationships? 2 2 1 2 2 1 1   In general, please rate how well you carry out your usual social activities and roles. (This includes activities at home, at work and in your community, and responsibilities as a parent, child, spouse, employee, friend, etc.) 2 2 1 1 1 1 1   To what extent are you  able to carry out your everyday physical activities such as walking, climbing stairs, carrying groceries, or moving a chair? 2 2 2 2 2 2 2   In the past 7 days, how often have you been bothered by emotional problems such as feeling anxious, depressed, or irritable? 5 5 5 5 5 5 4   In the past 7 days, how would you rate your fatigue on average? 5 4 4 5 5 4 4   In the past 7 days, how would you rate your pain on average, where 0 means no pain, and 10 means worst imaginable pain? 7 7 8 7 8 7 8   Global Mental Health Score 6 6 4  5  5  4  6    Global Physical Health Score 6 8 7  6  7  7  7    PROMIS TOTAL - SUBSCORES 12 14 11  11  12  11  13        Patient-reported     [place] Suicide Severity Rating Scale (Lifetime/Recent)      8/31/2024    12:07 PM 9/5/2024     5:33 PM 9/13/2024    10:52 AM 10/4/2024     4:54 PM 11/22/2024    11:34 AM 12/25/2024    11:30 PM 1/28/2025     1:23 PM   [place] Suicide Severity Rating (Lifetime/Recent)   Q1 Wished to be Dead (Past Month) 0-->no 0-->no 0-->no 0-->no 0-->no 0-->no 0-->no   Q2 Suicidal Thoughts (Past Month) 0-->no 0-->no 0-->no 0-->no 0-->no 0-->no 0-->no   Q6 Suicide Behavior (Lifetime) 0-->no 1-->yes 0-->no 0-->no 0-->no 0-->no 0-->no   If yes to Q6, within past 3 months?  0-->no        Level of Risk per Screen no risks indicated moderate risk no risks indicated no risks indicated no risks indicated no risks indicated no risks indicated         ASSESSMENT: Current Emotional / Mental Status (status of significant symptoms):   Risk status (Self / Other harm or suicidal ideation)   Patient denies current fears or concerns for personal safety.   Patient denies current or recent suicidal ideation or behaviors.   Patient denies current or recent homicidal ideation or behaviors.   Patient denies current or recent self injurious behavior or ideation.   Patient denies other safety concerns.   Patient reports there has been no change in risk factors since their last session.      Patient reports there has been no change in protective factors since their last session.     Recommended that patient call 911 or go to the local ED should there be a change in any of these risk factors.     Appearance:   Appropriate    Eye Contact:   Good    Psychomotor Behavior: Normal    Attitude:   Cooperative    Orientation:   All   Speech    Rate / Production: Normal/ Responsive Normal     Volume:  Normal    Mood:    Normal   Affect:    Appropriate    Thought Content:  Clear    Thought Form:  Coherent    Insight:    Good      Medication Review:   No changes to current psychiatric medication(s)     Medication Compliance:   Yes     Changes in Health Issues:   None reported     Chemical Use Review:   Substance Use: Chemical use reviewed, no active concerns identified      Tobacco Use: No change in amount of tobacco use since last session.  Patient declined discussion at this time    Diagnosis:  1. Generalized anxiety disorder    2. PTSD (post-traumatic stress disorder)    3. Severe episode of recurrent major depressive disorder, without psychotic features (H)            Collateral Reports Completed:   Not Applicable    PLAN: (Patient Tasks / Therapist Tasks / Other)  Continue to utilize self care and stress reduction skills daily.               Ricarda Bhatia, [facility]                                                         ______________________________________________________________________    Individual Treatment Plan    Patient's Name: Radha Beckwith  YOB: 1975    Date of Creation: 6/28/23  Date Treatment Plan Last Reviewed/Revised: 5/5/25    DSM5 Diagnoses: 296.32 (F33.1) Major Depressive Disorder, Recurrent Episode, Moderate _  Psychosocial / Contextual Factors: living with boyfriend, memory issues  PROMIS (reviewed every 90 days):     Referral / Collaboration:  Referral to another professional/service is not indicated at this time..    Anticipated number of session for this episode of  care: 9-12 sessions  Anticipation frequency of session: as needed  Anticipated Duration of each session: 38-52 minutes  Treatment plan will be reviewed in 90 days or when goals have been changed.       MeasurableTreatment Goal(s) related to diagnosis / functional impairment(s)  Goal 1: Patient will increase communication skills with family members.    Objective #A (Patient Action)    Patient will learn & utilize at least 2 assertive communication skills weekly.  Status: Continued - Date(s): 5/5/25    Intervention(s)  Therapist will teach emotional regulation skills. distress tolerance skills, interpersonal effectiveness skills, emotion regluation skills, mindfulness skills, radical acceptance. Therapist will teach client how to ID body cues for anxiety, anxiety reduction techniques, how to ID triggers for depression and anxiety- decrease reactivity/eliminate, lifestyle changes to reduce depression and anxiety, communication skills, explore cognitive beliefs and help client to develop healthy cognitive patterns and beliefs.    Objective #B  Patient will use thought-stopping strategy daily to reduce intrusive thoughts.  Status: Continued - Date(s): 5/5/25    Intervention(s)  Therapist will teach emotional regulation skills. distress tolerance skills, interpersonal effectiveness skills, emotion regluation skills, mindfulness skills, radical acceptance. Therapist will teach client how to ID body cues for anxiety, anxiety reduction techniques, how to ID triggers for depression and anxiety- decrease reactivity/eliminate, lifestyle changes to reduce depression and anxiety, communication skills, explore cognitive beliefs and help client to develop healthy cognitive patterns and beliefs.      Goal 2: Patient will decrease depression symptoms.      Objective #A (Patient Action)    Status: Continued - Date(s): 5/5/25    Patient will Increase interest, engagement, and pleasure in doing things  Decrease frequency and intensity  of feeling down, depressed, hopeless  Improve quantity and quality of night time sleep / decrease daytime naps  Feel less tired and more energy during the day   Improve diet, appetite, mindful eating, and / or meal planning  Identify negative self-talk and behaviors: challenge core beliefs, myths, and actions  Improve concentration, focus, and mindfulness in daily activities   Feel less fidgety, restless or slow in daily activities / interpersonal interactions.    Intervention(s)  Therapist will teach emotional regulation skills. distress tolerance skills, interpersonal effectiveness skills, emotion regluation skills, mindfulness skills, radical acceptance. Therapist will teach client how to ID body cues for anxiety, anxiety reduction techniques, how to ID triggers for depression and anxiety- decrease reactivity/eliminate, lifestyle changes to reduce depression and anxiety, communication skills, explore cognitive beliefs and help client to develop healthy cognitive patterns and beliefs.    Objective #B  Patient will identify three distraction and diversion activities and use those activities to decrease level of anxiety  .    Status: Continued - Date(s):  5/5/25    Intervention(s)  Therapist will teach emotional regulation skills. distress tolerance skills, interpersonal effectiveness skills, emotion regluation skills, mindfulness skills, radical acceptance. Therapist will teach client how to ID body cues for anxiety, anxiety reduction techniques, how to ID triggers for depression and anxiety- decrease reactivity/eliminate, lifestyle changes to reduce depression and anxiety, communication skills, explore cognitive beliefs and help client to develop healthy cognitive patterns and beliefs.      Patient has reviewed and agreed to the above plan.      Ricarda Bhatia [facility]

## 2025-06-02 ENCOUNTER — VIRTUAL VISIT (OUTPATIENT)
Dept: PSYCHOLOGY | Facility: CLINIC | Age: 50
End: 2025-06-02
Payer: COMMERCIAL

## 2025-06-02 DIAGNOSIS — F41.1 GENERALIZED ANXIETY DISORDER: Primary | ICD-10-CM

## 2025-06-02 DIAGNOSIS — F33.2 SEVERE EPISODE OF RECURRENT MAJOR DEPRESSIVE DISORDER, WITHOUT PSYCHOTIC FEATURES (H): ICD-10-CM

## 2025-06-02 DIAGNOSIS — F43.10 PTSD (POST-TRAUMATIC STRESS DISORDER): ICD-10-CM

## 2025-06-02 PROCEDURE — 90834 PSYTX W PT 45 MINUTES: CPT | Mod: 95 | Performed by: COUNSELOR
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
## 2025-06-03 ENCOUNTER — RADIOLOGY INJECTION OFFICE VISIT (OUTPATIENT)
Dept: PALLIATIVE MEDICINE | Facility: CLINIC | Age: 50
End: 2025-06-03
Payer: COMMERCIAL

## 2025-06-03 VITALS — HEART RATE: 108 BPM | DIASTOLIC BLOOD PRESSURE: 81 MMHG | SYSTOLIC BLOOD PRESSURE: 134 MMHG | OXYGEN SATURATION: 98 %

## 2025-06-03 DIAGNOSIS — M54.2 CHRONIC NECK PAIN: Primary | ICD-10-CM

## 2025-06-03 DIAGNOSIS — G89.29 CHRONIC NECK PAIN: Primary | ICD-10-CM

## 2025-06-03 DIAGNOSIS — M53.3 SI (SACROILIAC) JOINT DYSFUNCTION: ICD-10-CM

## 2025-06-03 PROCEDURE — 27096 INJECT SACROILIAC JOINT: CPT | Mod: 50 | Performed by: PAIN MEDICINE

## 2025-06-03 RX ORDER — LIDOCAINE AND PRILOCAINE 25; 25 MG/G; MG/G
CREAM TOPICAL ONCE
Status: ACTIVE | OUTPATIENT
Start: 2025-06-03

## 2025-06-03 RX ADMIN — TRIAMCINOLONE ACETONIDE 40 MG: 40 INJECTION, SUSPENSION INTRA-ARTICULAR; INTRAMUSCULAR at 08:34

## 2025-06-03 ASSESSMENT — PAIN SCALES - GENERAL
PAINLEVEL_OUTOF10: SEVERE PAIN (8)
PAINLEVEL_OUTOF10: SEVERE PAIN (7)

## 2025-06-03 NOTE — PATIENT INSTRUCTIONS
[facility]   Procedure Discharge Instructions    Today you saw:    Dr. King Reynaga,         You had a(n):  SI joint injection bilateral    Medications used for SI joint: Lidocaine, Omnipaque, Kenalog, Bupivicaine        Be cautious with walking. Numbness and/or weakness in the lower extremities may occur for up to 6-8 hours after the procedure due to effect of the local anesthetic  Do not drive for 6 hours. The effect of the local anesthetic could slow your reflexes.   You may resume your regular activities after 24 hours  Avoid strenuous activity for the first 24 hours  You may shower, however avoid swimming, tub baths or hot tubs for 24 hours following your procedure  You may have a mild to moderate increase in pain for several days following the injection.  It may take up to 14 days for the steroid medication to start working although you may feel the effect as early as a few days after the procedure.     You may use ice packs for 10-15 minutes, 3 to 4 times a day at the injection site for comfort  Do not use heat to painful areas for 6 to 8 hours. This will give the local anesthetic time to wear off and prevent you from accidentally burning your skin.   Unless you have been directed to avoid the use of anti-inflammatory medications (NSAIDS), you may use medications such as ibuprofen, Aleve or Tylenol for pain control if needed.   If you have diabetes, check your blood sugar more frequently than usual as your blood sugar may be higher than normal for 10-14 days following a steroid injection. Contact your doctor who manages your diabetes if your blood sugar is higher than usual  Possible side effects of steroids that you may experience include flushing, elevated blood pressure, increased appetite, mild headaches and restlessness.  All of these symptoms will get better with time.  If you experience any of the following, call the Pain Clinic during work hours (Mon-Friday 8-4:30 pm)  at [phone] or the Provider Line after hours at [phone]:  -Fever over 100 degree F  -Swelling, bleeding, redness, drainage, warmth at the injection site  -Progressive weakness or numbness in your legs  -Unusual new onset of pain that is not improving

## 2025-06-03 NOTE — NURSING NOTE
Discharge Information    IV Discontiued Time:  NA    Amount of Fluid Infused:  NA    Discharge Criteria = When patient returns to baseline or as per MD order    Consciousness:  Pt is fully awake    Circulation:  BP +/- 20% of pre-procedure level    Respiration:  Patient is able to breathe deeply    O2 Sat:  Patient is able to maintain O2 Sat >92% on room air    Activity:  Moves 4 extremities on command    Ambulation:  Patient is able to stand and walk or stand and pivot into wheelchair    Dressing:  Clean/dry or No Dressing    Notes:   Discharge instructions and AVS given to patient    Patient meets criteria for discharge?  YES    Admitted to PCU?  No    Responsible adult present to accompany patient home?  Yes    Signature/Title:    Reema Gaffney RN  RN Care Coordinator  [facility]

## 2025-06-09 ENCOUNTER — VIRTUAL VISIT (OUTPATIENT)
Dept: PSYCHOLOGY | Facility: CLINIC | Age: 50
End: 2025-06-09
Payer: COMMERCIAL

## 2025-06-09 DIAGNOSIS — F43.10 PTSD (POST-TRAUMATIC STRESS DISORDER): ICD-10-CM

## 2025-06-09 DIAGNOSIS — F33.2 SEVERE EPISODE OF RECURRENT MAJOR DEPRESSIVE DISORDER, WITHOUT PSYCHOTIC FEATURES (H): ICD-10-CM

## 2025-06-09 DIAGNOSIS — F41.1 GENERALIZED ANXIETY DISORDER: Primary | ICD-10-CM

## 2025-06-09 PROCEDURE — 90837 PSYTX W PT 60 MINUTES: CPT | Mod: 95 | Performed by: COUNSELOR

## 2025-06-09 ASSESSMENT — ANXIETY QUESTIONNAIRES
4. TROUBLE RELAXING: NEARLY EVERY DAY
3. WORRYING TOO MUCH ABOUT DIFFERENT THINGS: NEARLY EVERY DAY
1. FEELING NERVOUS, ANXIOUS, OR ON EDGE: NEARLY EVERY DAY
GAD7 TOTAL SCORE: 21
6. BECOMING EASILY ANNOYED OR IRRITABLE: NEARLY EVERY DAY
7. FEELING AFRAID AS IF SOMETHING AWFUL MIGHT HAPPEN: NEARLY EVERY DAY
5. BEING SO RESTLESS THAT IT IS HARD TO SIT STILL: NEARLY EVERY DAY
8. IF YOU CHECKED OFF ANY PROBLEMS, HOW DIFFICULT HAVE THESE MADE IT FOR YOU TO DO YOUR WORK, TAKE CARE OF THINGS AT HOME, OR GET ALONG WITH OTHER PEOPLE?: EXTREMELY DIFFICULT
2. NOT BEING ABLE TO STOP OR CONTROL WORRYING: NEARLY EVERY DAY
IF YOU CHECKED OFF ANY PROBLEMS ON THIS QUESTIONNAIRE, HOW DIFFICULT HAVE THESE PROBLEMS MADE IT FOR YOU TO DO YOUR WORK, TAKE CARE OF THINGS AT HOME, OR GET ALONG WITH OTHER PEOPLE: EXTREMELY DIFFICULT
7. FEELING AFRAID AS IF SOMETHING AWFUL MIGHT HAPPEN: NEARLY EVERY DAY
GAD7 TOTAL SCORE: 21
4. TROUBLE RELAXING: NEARLY EVERY DAY
IF YOU CHECKED OFF ANY PROBLEMS ON THIS QUESTIONNAIRE, HOW DIFFICULT HAVE THESE PROBLEMS MADE IT FOR YOU TO DO YOUR WORK, TAKE CARE OF THINGS AT HOME, OR GET ALONG WITH OTHER PEOPLE: EXTREMELY DIFFICULT
GAD7 TOTAL SCORE: 21
6. BECOMING EASILY ANNOYED OR IRRITABLE: NEARLY EVERY DAY
8. IF YOU CHECKED OFF ANY PROBLEMS, HOW DIFFICULT HAVE THESE MADE IT FOR YOU TO DO YOUR WORK, TAKE CARE OF THINGS AT HOME, OR GET ALONG WITH OTHER PEOPLE?: EXTREMELY DIFFICULT
5. BEING SO RESTLESS THAT IT IS HARD TO SIT STILL: NEARLY EVERY DAY
3. WORRYING TOO MUCH ABOUT DIFFERENT THINGS: NEARLY EVERY DAY
GAD7 TOTAL SCORE: 21
2. NOT BEING ABLE TO STOP OR CONTROL WORRYING: NEARLY EVERY DAY
1. FEELING NERVOUS, ANXIOUS, OR ON EDGE: NEARLY EVERY DAY

## 2025-06-09 NOTE — PROGRESS NOTES
Answers submitted by the patient for this visit:  Patient Health Questionnaire (G7) (Submitted on 6/9/2025)  BO 7 TOTAL SCORE: 21          [facility]                                     Progress Note    Patient Name: Radha Beckwith  Date: 6/9/25         Service Type: Individual      Session Start Time: 2:30pm Session End Time: 3:30pm     Session Length:  60    Session #: 82    Attendees: Client    Service Modality:  video      Provider verified identity through the following two step process.  Patient provided:  Patient is known previously to provider    Telemedicine Visit: The patient's condition can be safely assessed and treated via synchronous audio and visual telemedicine encounter.      Reason for Telemedicine Visit: Patient convenience (e.g. access to timely appointments / distance to available provider)    Originating Site (Patient Location): Patient's home    Distant Site (Provider Location): Provider Remote Setting- Home Office    Consent:  The patient/guardian has verbally consented to: the potential risks and benefits of telemedicine (video visit) versus in person care; bill my insurance or make self-payment for services provided; and responsibility for payment of non-covered services.     Patient would like the video invitation sent by:  My Chart    Mode of Communication:  telephone- client did not have access to wi/fi to be able to do video session    Distant Location (Provider):  Off-site home office    As the provider I attest to compliance with applicable laws and regulations related to telemedicine.    There has been demonstrated improvement in functioning while patient has been engaged in psychotherapy/psychological service- if withdrawn the patient would deteriorate and/or relapse.      DATA  Interactive Complexity: No  Crisis: No        Progress Since Last Session (Related to Symptoms / Goals / Homework):   Symptoms: No change .    Homework: Completed in  session      Episode of Care Goals: Satisfactory progress - MAINTENANCE (Working to maintain change, with risk of relapse); Intervened by continuing to positively reinforce healthy behavior choice      Current / Ongoing Stressors and Concerns:   Client had injections on Tuesday for SI joint. It went well.   Had a really busy weekend which has left her exhausted.        Treatment Objective(s) Addressed in This Session:   Increase interest, engagement, and pleasure in doing things  Feel less tired and more energy during the day        Intervention:   Discussed internal processes and conflicts client experienced over the weekend with all the events she went to (graduation parties, baby showers).     Assessments completed prior to visit:  The following assessments were completed by patient for this visit:  PHQ9:       2/25/2025     6:49 PM 3/3/2025     9:02 AM 3/10/2025     2:30 PM 3/17/2025    11:36 AM 4/21/2025     1:54 PM 5/5/2025    11:49 AM 5/12/2025     2:03 PM   PHQ-9 SCORE   PHQ-9 Total Score MyChart 24 (Severe depression) 24 (Severe depression) 22 (Severe depression) 23 (Severe depression) 24 (Severe depression) 24 (Severe depression) 22 (Severe depression)   PHQ-9 Total Score 24  24  22  23  24  24  22        Patient-reported     GAD7:       1/6/2025     3:30 PM 2/5/2025     7:35 PM 2/24/2025    12:03 PM 3/17/2025    11:37 AM 4/21/2025     1:55 PM 5/5/2025    11:50 AM 6/9/2025     2:26 PM   BO-7 SCORE   Total Score 21 (severe anxiety) 20 (severe anxiety) 20 (severe anxiety) 20 (severe anxiety) 20 (severe anxiety) 19 (severe anxiety) 21 (severe anxiety)   Total Score 21  20  20  20  20  19  21        Patient-reported     PROMIS 10-Global Health (all questions and answers displayed):       8/28/2024     2:07 PM 12/1/2024     4:43 PM 12/16/2024     1:07 PM 1/6/2025     3:31 PM 2/24/2025    12:00 PM 5/27/2025     1:59 PM 6/9/2025     2:28 PM   PROMIS 10   In general, would you say your health is: Poor Poor Poor  Poor Poor Fair Fair   In general, would you say your quality of life is: Fair Poor Poor Poor Poor Fair Poor   In general, how would you rate your physical health? Fair Poor Poor Fair Poor Poor Fair   In general, how would you rate your mental health, including your mood and your ability to think? Poor Poor Poor Poor Poor Poor Poor   In general, how would you rate your satisfaction with your social activities and relationships? Fair Poor Fair Fair Poor Poor Poor   In general, please rate how well you carry out your usual social activities and roles Fair Poor Poor Poor Poor Poor Poor   To what extent are you able to carry out your everyday physical activities such as walking, climbing stairs, carrying groceries, or moving a chair? A little A little A little A little A little A little A little   In the past 7 days, how often have you been bothered by emotional problems such as feeling anxious, depressed, or irritable? Always Always Always Always Always Often Always   In the past 7 days, how would you rate your fatigue on average? Severe Severe Very severe Very severe Severe Severe Severe   In the past 7 days, how would you rate your pain on average, where 0 means no pain, and 10 means worst imaginable pain? 7 8 7 8 7 8 7   In general, would you say your health is: 1 1 1 1 1 2 2   In general, would you say your quality of life is: 2 1 1 1 1 2 1   In general, how would you rate your physical health? 2 1 1 2 1 1 2   In general, how would you rate your mental health, including your mood and your ability to think? 1 1 1 1 1 1 1   In general, how would you rate your satisfaction with your social activities and relationships? 2 1 2 2 1 1 1   In general, please rate how well you carry out your usual social activities and roles. (This includes activities at home, at work and in your community, and responsibilities as a parent, child, spouse, employee, friend, etc.) 2 1 1 1 1 1 1   To what extent are you able to carry out your  everyday physical activities such as walking, climbing stairs, carrying groceries, or moving a chair? 2 2 2 2 2 2 2   In the past 7 days, how often have you been bothered by emotional problems such as feeling anxious, depressed, or irritable? 5 5 5 5 5 4 5   In the past 7 days, how would you rate your fatigue on average? 4 4 5 5 4 4 4   In the past 7 days, how would you rate your pain on average, where 0 means no pain, and 10 means worst imaginable pain? 7 8 7 8 7 8 7   Global Mental Health Score 6 4  5  5  4  6  4    Global Physical Health Score 8 7  6  7  7  7  8    PROMIS TOTAL - SUBSCORES 14 11  11  12  11  13  12        Patient-reported     [place] Suicide Severity Rating Scale (Lifetime/Recent)      8/31/2024    12:07 PM 9/5/2024     5:33 PM 9/13/2024    10:52 AM 10/4/2024     4:54 PM 11/22/2024    11:34 AM 12/25/2024    11:30 PM 1/28/2025     1:23 PM   [place] Suicide Severity Rating (Lifetime/Recent)   Q1 Wished to be Dead (Past Month) 0-->no 0-->no 0-->no 0-->no 0-->no 0-->no 0-->no   Q2 Suicidal Thoughts (Past Month) 0-->no 0-->no 0-->no 0-->no 0-->no 0-->no 0-->no   Q6 Suicide Behavior (Lifetime) 0-->no 1-->yes 0-->no 0-->no 0-->no 0-->no 0-->no   If yes to Q6, within past 3 months?  0-->no        Level of Risk per Screen no risks indicated moderate risk no risks indicated no risks indicated no risks indicated no risks indicated no risks indicated         ASSESSMENT: Current Emotional / Mental Status (status of significant symptoms):   Risk status (Self / Other harm or suicidal ideation)   Patient denies current fears or concerns for personal safety.   Patient denies current or recent suicidal ideation or behaviors.   Patient denies current or recent homicidal ideation or behaviors.   Patient denies current or recent self injurious behavior or ideation.   Patient denies other safety concerns.   Patient reports there has been no change in risk factors since their last session.     Patient reports there  has been no change in protective factors since their last session.     Recommended that patient call 911 or go to the local ED should there be a change in any of these risk factors.     Appearance:   Appropriate    Eye Contact:   Good    Psychomotor Behavior: Normal    Attitude:   Cooperative    Orientation:   All   Speech    Rate / Production: Normal/ Responsive Normal     Volume:  Normal    Mood:    Normal   Affect:    Appropriate    Thought Content:  Clear    Thought Form:  Coherent    Insight:    Good      Medication Review:   No changes to current psychiatric medication(s)     Medication Compliance:   Yes     Changes in Health Issues:   None reported     Chemical Use Review:   Substance Use: Chemical use reviewed, no active concerns identified      Tobacco Use: No change in amount of tobacco use since last session.  Patient declined discussion at this time    Diagnosis:  1. Generalized anxiety disorder    2. PTSD (post-traumatic stress disorder)    3. Severe episode of recurrent major depressive disorder, without psychotic features (H)            Collateral Reports Completed:   Not Applicable    PLAN: (Patient Tasks / Therapist Tasks / Other)  Continue to utilize self care and stress reduction skills daily.               Ricarda Bhatia, [facility]                                                         ______________________________________________________________________    Individual Treatment Plan    Patient's Name: Radha Beckwith  YOB: 1975    Date of Creation: 6/28/23  Date Treatment Plan Last Reviewed/Revised: 5/5/25    DSM5 Diagnoses: 296.32 (F33.1) Major Depressive Disorder, Recurrent Episode, Moderate _  Psychosocial / Contextual Factors: living with boyfriend, memory issues  PROMIS (reviewed every 90 days):     Referral / Collaboration:  Referral to another professional/service is not indicated at this time..    Anticipated number of session for this episode of care: 9-12  sessions  Anticipation frequency of session: as needed  Anticipated Duration of each session: 38-52 minutes  Treatment plan will be reviewed in 90 days or when goals have been changed.       MeasurableTreatment Goal(s) related to diagnosis / functional impairment(s)  Goal 1: Patient will increase communication skills with family members.    Objective #A (Patient Action)    Patient will learn & utilize at least 2 assertive communication skills weekly.  Status: Continued - Date(s): 5/5/25    Intervention(s)  Therapist will teach emotional regulation skills. distress tolerance skills, interpersonal effectiveness skills, emotion regluation skills, mindfulness skills, radical acceptance. Therapist will teach client how to ID body cues for anxiety, anxiety reduction techniques, how to ID triggers for depression and anxiety- decrease reactivity/eliminate, lifestyle changes to reduce depression and anxiety, communication skills, explore cognitive beliefs and help client to develop healthy cognitive patterns and beliefs.    Objective #B  Patient will use thought-stopping strategy daily to reduce intrusive thoughts.  Status: Continued - Date(s): 5/5/25    Intervention(s)  Therapist will teach emotional regulation skills. distress tolerance skills, interpersonal effectiveness skills, emotion regluation skills, mindfulness skills, radical acceptance. Therapist will teach client how to ID body cues for anxiety, anxiety reduction techniques, how to ID triggers for depression and anxiety- decrease reactivity/eliminate, lifestyle changes to reduce depression and anxiety, communication skills, explore cognitive beliefs and help client to develop healthy cognitive patterns and beliefs.      Goal 2: Patient will decrease depression symptoms.      Objective #A (Patient Action)    Status: Continued - Date(s): 5/5/25    Patient will Increase interest, engagement, and pleasure in doing things  Decrease frequency and intensity of feeling  down, depressed, hopeless  Improve quantity and quality of night time sleep / decrease daytime naps  Feel less tired and more energy during the day   Improve diet, appetite, mindful eating, and / or meal planning  Identify negative self-talk and behaviors: challenge core beliefs, myths, and actions  Improve concentration, focus, and mindfulness in daily activities   Feel less fidgety, restless or slow in daily activities / interpersonal interactions.    Intervention(s)  Therapist will teach emotional regulation skills. distress tolerance skills, interpersonal effectiveness skills, emotion regluation skills, mindfulness skills, radical acceptance. Therapist will teach client how to ID body cues for anxiety, anxiety reduction techniques, how to ID triggers for depression and anxiety- decrease reactivity/eliminate, lifestyle changes to reduce depression and anxiety, communication skills, explore cognitive beliefs and help client to develop healthy cognitive patterns and beliefs.    Objective #B  Patient will identify three distraction and diversion activities and use those activities to decrease level of anxiety  .    Status: Continued - Date(s):  5/5/25    Intervention(s)  Therapist will teach emotional regulation skills. distress tolerance skills, interpersonal effectiveness skills, emotion regluation skills, mindfulness skills, radical acceptance. Therapist will teach client how to ID body cues for anxiety, anxiety reduction techniques, how to ID triggers for depression and anxiety- decrease reactivity/eliminate, lifestyle changes to reduce depression and anxiety, communication skills, explore cognitive beliefs and help client to develop healthy cognitive patterns and beliefs.      Patient has reviewed and agreed to the above plan.      Ricarda Bhatia [facility]

## 2025-06-11 RX ORDER — TRIAMCINOLONE ACETONIDE 40 MG/ML
40 INJECTION, SUSPENSION INTRA-ARTICULAR; INTRAMUSCULAR ONCE
Status: DISCONTINUED | OUTPATIENT
Start: 2025-06-11 | End: 2025-06-11

## 2025-06-11 RX ORDER — TRIAMCINOLONE ACETONIDE 40 MG/ML
40 INJECTION, SUSPENSION INTRA-ARTICULAR; INTRAMUSCULAR ONCE
Status: COMPLETED | OUTPATIENT
Start: 2025-06-03 | End: 2025-06-03

## 2025-06-11 NOTE — PROGRESS NOTES
Pre procedure Diagnosis: SI joint dysfunction    Post procedure Diagnosis: Same  Procedure performed: bilateral SI joint injection  Anesthesia: none  Complications: none  Operators: King Reynaga MD     Indications:   Radha Beckwith is a 49 year old female. The patient has a history of upper buttocks pain. Other conservative treatments prior to injection include meds/pt.    Options/alternatives, benefits and risks were discussed with the patient including bleeding, infection, tissue trauma, exposure to radiation, reaction to medications including seizure, nerve injury, weakness, and numbness.  Questions were answered to her satisfaction and she agrees to proceed. Voluntary informed consent was obtained and signed.     Vitals were reviewed: Yes  Allergies were reviewed:  Yes   Medications were reviewed:  Yes   Pre-procedure pain score: 7/10    Procedure:  After obtaining signed informed consent, the patient was brought into the procedure suite and was placed in a prone position on the procedure table.   A Pause for the Cause was performed.  The patient was prepped and draped in the usual sterile fashion.     After identifying the bilateral SI joint, the C-arm was rotated obliquely to obtain the best view of the inferior angle of the joint.  Then 4 ml of Lidocaine 1%  was used to anesthetize the skin at a skin entry site coaxial with the fluoroscopy beam at this location.  A 22 gauge 3.5 inch needle was advanced under intermittent fluoroscopy until it was felt to enter the SI joint.  Aspiration was negative.    A total of 1ml of Omnipaque-300 was injected, confirming appropriate position, with spread into the intraarticular space, with no intravascular uptake noted.  9ml of Omnipaque was wasted. Location was verified in lateral view.    2 ml of 0.5% bupivacaine with 40mg of Kenalog was injected.  The needle was removed.     Hemostasis was achieved, the area was cleaned, and bandaids were placed when  appropriate.  The patient tolerated the procedure well, and was taken to the recovery room.  Images were saved to PACS.    Post-procedure pain score: 8/10  Follow-up includes:   -f/u with referring Physician     King Reynaga MD  [facility] [place]

## 2025-06-12 ENCOUNTER — PATIENT OUTREACH (OUTPATIENT)
Dept: CARE COORDINATION | Facility: CLINIC | Age: 50
End: 2025-06-12
Payer: COMMERCIAL

## 2025-06-13 DIAGNOSIS — K21.00 GASTROESOPHAGEAL REFLUX DISEASE WITH ESOPHAGITIS WITHOUT HEMORRHAGE: ICD-10-CM

## 2025-06-13 DIAGNOSIS — K59.03 CONSTIPATION DUE TO OPIOID THERAPY: ICD-10-CM

## 2025-06-13 DIAGNOSIS — T40.2X5A CONSTIPATION DUE TO OPIOID THERAPY: ICD-10-CM

## 2025-06-16 ENCOUNTER — VIRTUAL VISIT (OUTPATIENT)
Dept: PSYCHOLOGY | Facility: CLINIC | Age: 50
End: 2025-06-16
Payer: COMMERCIAL

## 2025-06-16 DIAGNOSIS — F41.1 GENERALIZED ANXIETY DISORDER: Primary | ICD-10-CM

## 2025-06-16 DIAGNOSIS — F33.2 SEVERE EPISODE OF RECURRENT MAJOR DEPRESSIVE DISORDER, WITHOUT PSYCHOTIC FEATURES (H): ICD-10-CM

## 2025-06-16 DIAGNOSIS — F43.10 PTSD (POST-TRAUMATIC STRESS DISORDER): ICD-10-CM

## 2025-06-16 PROCEDURE — 90834 PSYTX W PT 45 MINUTES: CPT | Mod: 95 | Performed by: COUNSELOR

## 2025-06-16 RX ORDER — BISACODYL 5 MG/1
5 TABLET, DELAYED RELEASE ORAL EVERY OTHER DAY
Qty: 30 TABLET | Refills: 0 | Status: SHIPPED | OUTPATIENT
Start: 2025-06-16

## 2025-06-16 NOTE — PROGRESS NOTES
Answers submitted by the patient for this visit:  Patient Health Questionnaire (Submitted on 6/16/2025)  If you checked off any problems, how difficult have these problems made it for you to do your work, take care of things at home, or get along with other people?: Extremely difficult  PHQ9 TOTAL SCORE: 22  Patient Health Questionnaire (G7) (Submitted on 6/9/2025)  BO 7 TOTAL SCORE: 21          [facility]                                     Progress Note    Patient Name: Radha Beckwith  Date: 6/16/25         Service Type: Individual      Session Start Time: 2:30pm Session End Time: 3:20pm     Session Length:  50    Session #: 83    Attendees: Client    Service Modality:  video      Provider verified identity through the following two step process.  Patient provided:  Patient is known previously to provider    Telemedicine Visit: The patient's condition can be safely assessed and treated via synchronous audio and visual telemedicine encounter.      Reason for Telemedicine Visit: Patient convenience (e.g. access to timely appointments / distance to available provider)    Originating Site (Patient Location): Patient's home    Distant Site (Provider Location): Provider Remote Setting- Home Office    Consent:  The patient/guardian has verbally consented to: the potential risks and benefits of telemedicine (video visit) versus in person care; bill my insurance or make self-payment for services provided; and responsibility for payment of non-covered services.     Patient would like the video invitation sent by:  My Chart    Mode of Communication:  telephone- client did not have access to wi/fi to be able to do video session    Distant Location (Provider):  Off-site home office    As the provider I attest to compliance with applicable laws and regulations related to telemedicine.    There has been demonstrated improvement in functioning while patient has been engaged in psychotherapy/psychological  service- if withdrawn the patient would deteriorate and/or relapse.      DATA  Interactive Complexity: No  Crisis: No        Progress Since Last Session (Related to Symptoms / Goals / Homework):   Symptoms: No change .    Homework: Completed in session      Episode of Care Goals: Satisfactory progress - MAINTENANCE (Working to maintain change, with risk of relapse); Intervened by continuing to positively reinforce healthy behavior choice      Current / Ongoing Stressors and Concerns:   Continued financial stress with bills and groceries.           Treatment Objective(s) Addressed in This Session:   Increase interest, engagement, and pleasure in doing things  Feel less tired and more energy during the day        Intervention:   Discussed past when she felt the most effective and how remembering this effects her today emotionally. Discussed relationship dynamics with her son and with her boyfriend.     Assessments completed prior to visit:  The following assessments were completed by patient for this visit:  PHQ9:       3/3/2025     9:02 AM 3/10/2025     2:30 PM 3/17/2025    11:36 AM 4/21/2025     1:54 PM 5/5/2025    11:49 AM 5/12/2025     2:03 PM 6/16/2025    11:27 AM   PHQ-9 SCORE   PHQ-9 Total Score MyChart 24 (Severe depression) 22 (Severe depression) 23 (Severe depression) 24 (Severe depression) 24 (Severe depression) 22 (Severe depression) 22 (Severe depression)   PHQ-9 Total Score 24  22  23  24  24  22  22        Patient-reported     GAD7:       1/6/2025     3:30 PM 2/5/2025     7:35 PM 2/24/2025    12:03 PM 3/17/2025    11:37 AM 4/21/2025     1:55 PM 5/5/2025    11:50 AM 6/9/2025     2:26 PM   BO-7 SCORE   Total Score 21 (severe anxiety) 20 (severe anxiety) 20 (severe anxiety) 20 (severe anxiety) 20 (severe anxiety) 19 (severe anxiety) 21 (severe anxiety)   Total Score 21  20  20  20  20  19  21        Patient-reported     PROMIS 10-Global Health (all questions and answers displayed):       8/28/2024      2:07 PM 12/1/2024     4:43 PM 12/16/2024     1:07 PM 1/6/2025     3:31 PM 2/24/2025    12:00 PM 5/27/2025     1:59 PM 6/9/2025     2:28 PM   PROMIS 10   In general, would you say your health is: Poor Poor Poor Poor Poor Fair Fair   In general, would you say your quality of life is: Fair Poor Poor Poor Poor Fair Poor   In general, how would you rate your physical health? Fair Poor Poor Fair Poor Poor Fair   In general, how would you rate your mental health, including your mood and your ability to think? Poor Poor Poor Poor Poor Poor Poor   In general, how would you rate your satisfaction with your social activities and relationships? Fair Poor Fair Fair Poor Poor Poor   In general, please rate how well you carry out your usual social activities and roles Fair Poor Poor Poor Poor Poor Poor   To what extent are you able to carry out your everyday physical activities such as walking, climbing stairs, carrying groceries, or moving a chair? A little A little A little A little A little A little A little   In the past 7 days, how often have you been bothered by emotional problems such as feeling anxious, depressed, or irritable? Always Always Always Always Always Often Always   In the past 7 days, how would you rate your fatigue on average? Severe Severe Very severe Very severe Severe Severe Severe   In the past 7 days, how would you rate your pain on average, where 0 means no pain, and 10 means worst imaginable pain? 7 8 7 8 7 8 7   In general, would you say your health is: 1 1 1 1 1 2 2   In general, would you say your quality of life is: 2 1 1 1 1 2 1   In general, how would you rate your physical health? 2 1 1 2 1 1 2   In general, how would you rate your mental health, including your mood and your ability to think? 1 1 1 1 1 1 1   In general, how would you rate your satisfaction with your social activities and relationships? 2 1 2 2 1 1 1   In general, please rate how well you carry out your usual social activities and  roles. (This includes activities at home, at work and in your community, and responsibilities as a parent, child, spouse, employee, friend, etc.) 2 1 1 1 1 1 1   To what extent are you able to carry out your everyday physical activities such as walking, climbing stairs, carrying groceries, or moving a chair? 2 2 2 2 2 2 2   In the past 7 days, how often have you been bothered by emotional problems such as feeling anxious, depressed, or irritable? 5 5 5 5 5 4 5   In the past 7 days, how would you rate your fatigue on average? 4 4 5 5 4 4 4   In the past 7 days, how would you rate your pain on average, where 0 means no pain, and 10 means worst imaginable pain? 7 8 7 8 7 8 7   Global Mental Health Score 6 4  5  5  4  6  4    Global Physical Health Score 8 7  6  7  7  7  8    PROMIS TOTAL - SUBSCORES 14 11  11  12  11  13  12        Patient-reported     [place] Suicide Severity Rating Scale (Lifetime/Recent)      8/31/2024    12:07 PM 9/5/2024     5:33 PM 9/13/2024    10:52 AM 10/4/2024     4:54 PM 11/22/2024    11:34 AM 12/25/2024    11:30 PM 1/28/2025     1:23 PM   [place] Suicide Severity Rating (Lifetime/Recent)   Q1 Wished to be Dead (Past Month) 0-->no 0-->no 0-->no 0-->no 0-->no 0-->no 0-->no   Q2 Suicidal Thoughts (Past Month) 0-->no 0-->no 0-->no 0-->no 0-->no 0-->no 0-->no   Q6 Suicide Behavior (Lifetime) 0-->no 1-->yes 0-->no 0-->no 0-->no 0-->no 0-->no   If yes to Q6, within past 3 months?  0-->no        Level of Risk per Screen no risks indicated moderate risk no risks indicated no risks indicated no risks indicated no risks indicated no risks indicated         ASSESSMENT: Current Emotional / Mental Status (status of significant symptoms):   Risk status (Self / Other harm or suicidal ideation)   Patient denies current fears or concerns for personal safety.   Patient denies current or recent suicidal ideation or behaviors.   Patient denies current or recent homicidal ideation or behaviors.   Patient denies  current or recent self injurious behavior or ideation.   Patient denies other safety concerns.   Patient reports there has been no change in risk factors since their last session.     Patient reports there has been no change in protective factors since their last session.     Recommended that patient call 911 or go to the local ED should there be a change in any of these risk factors.     Appearance:   Appropriate    Eye Contact:   Good    Psychomotor Behavior: Normal    Attitude:   Cooperative    Orientation:   All   Speech    Rate / Production: Normal/ Responsive Normal     Volume:  Normal    Mood:    Normal   Affect:    Appropriate    Thought Content:  Clear    Thought Form:  Coherent    Insight:    Good      Medication Review:   No changes to current psychiatric medication(s)     Medication Compliance:   Yes     Changes in Health Issues:   None reported     Chemical Use Review:   Substance Use: Chemical use reviewed, no active concerns identified      Tobacco Use: No change in amount of tobacco use since last session.  Patient declined discussion at this time    Diagnosis:  1. Generalized anxiety disorder    2. PTSD (post-traumatic stress disorder)    3. Severe episode of recurrent major depressive disorder, without psychotic features (H)            Collateral Reports Completed:   Not Applicable    PLAN: (Patient Tasks / Therapist Tasks / Other)  Continue to utilize self care and stress reduction skills daily.               Ricarda Bhatia, [facility]                                                         ______________________________________________________________________    Individual Treatment Plan    Patient's Name: Radha Beckwith  YOB: 1975    Date of Creation: 6/28/23  Date Treatment Plan Last Reviewed/Revised: 5/5/25    DSM5 Diagnoses: 296.32 (F33.1) Major Depressive Disorder, Recurrent Episode, Moderate _  Psychosocial / Contextual Factors: living with boyfriend, memory issues  PROMIS  (reviewed every 90 days):     Referral / Collaboration:  Referral to another professional/service is not indicated at this time..    Anticipated number of session for this episode of care: 9-12 sessions  Anticipation frequency of session: as needed  Anticipated Duration of each session: 38-52 minutes  Treatment plan will be reviewed in 90 days or when goals have been changed.       MeasurableTreatment Goal(s) related to diagnosis / functional impairment(s)  Goal 1: Patient will increase communication skills with family members.    Objective #A (Patient Action)    Patient will learn & utilize at least 2 assertive communication skills weekly.  Status: Continued - Date(s): 5/5/25    Intervention(s)  Therapist will teach emotional regulation skills. distress tolerance skills, interpersonal effectiveness skills, emotion regluation skills, mindfulness skills, radical acceptance. Therapist will teach client how to ID body cues for anxiety, anxiety reduction techniques, how to ID triggers for depression and anxiety- decrease reactivity/eliminate, lifestyle changes to reduce depression and anxiety, communication skills, explore cognitive beliefs and help client to develop healthy cognitive patterns and beliefs.    Objective #B  Patient will use thought-stopping strategy daily to reduce intrusive thoughts.  Status: Continued - Date(s): 5/5/25    Intervention(s)  Therapist will teach emotional regulation skills. distress tolerance skills, interpersonal effectiveness skills, emotion regluation skills, mindfulness skills, radical acceptance. Therapist will teach client how to ID body cues for anxiety, anxiety reduction techniques, how to ID triggers for depression and anxiety- decrease reactivity/eliminate, lifestyle changes to reduce depression and anxiety, communication skills, explore cognitive beliefs and help client to develop healthy cognitive patterns and beliefs.      Goal 2: Patient will decrease depression  symptoms.      Objective #A (Patient Action)    Status: Continued - Date(s): 5/5/25    Patient will Increase interest, engagement, and pleasure in doing things  Decrease frequency and intensity of feeling down, depressed, hopeless  Improve quantity and quality of night time sleep / decrease daytime naps  Feel less tired and more energy during the day   Improve diet, appetite, mindful eating, and / or meal planning  Identify negative self-talk and behaviors: challenge core beliefs, myths, and actions  Improve concentration, focus, and mindfulness in daily activities   Feel less fidgety, restless or slow in daily activities / interpersonal interactions.    Intervention(s)  Therapist will teach emotional regulation skills. distress tolerance skills, interpersonal effectiveness skills, emotion regluation skills, mindfulness skills, radical acceptance. Therapist will teach client how to ID body cues for anxiety, anxiety reduction techniques, how to ID triggers for depression and anxiety- decrease reactivity/eliminate, lifestyle changes to reduce depression and anxiety, communication skills, explore cognitive beliefs and help client to develop healthy cognitive patterns and beliefs.    Objective #B  Patient will identify three distraction and diversion activities and use those activities to decrease level of anxiety  .    Status: Continued - Date(s):  5/5/25    Intervention(s)  Therapist will teach emotional regulation skills. distress tolerance skills, interpersonal effectiveness skills, emotion regluation skills, mindfulness skills, radical acceptance. Therapist will teach client how to ID body cues for anxiety, anxiety reduction techniques, how to ID triggers for depression and anxiety- decrease reactivity/eliminate, lifestyle changes to reduce depression and anxiety, communication skills, explore cognitive beliefs and help client to develop healthy cognitive patterns and beliefs.      Patient has reviewed and agreed  to the above plan.      [facility]

## 2025-06-18 DIAGNOSIS — G25.81 RESTLESS LEGS SYNDROME (RLS): ICD-10-CM

## 2025-06-18 RX ORDER — ROPINIROLE 1 MG/1
1 TABLET, FILM COATED ORAL AT BEDTIME
Qty: 90 TABLET | Refills: 1 | Status: SHIPPED | OUTPATIENT
Start: 2025-06-18

## 2025-06-18 NOTE — PROGRESS NOTES
[facility]     Date of visit: 6/19/2025      Assessment/Plan:   Radha Beckwith is a 49 year old female with a past medical history significant for chronic bilateral low back pain, depression, SVT, opioid dependence in remission, anxiety, tobacco use, GERD, restless legs who presents in follow up for:     Low back pain - Onset of pain around 2021. Pain is mostly axial, though she does have intermittent radicular leg symptoms, occasionally extends below the knee. Etiology is likely associated with multiple factors, including underlying degenerative changes of lumbar spine, facet and SIJ mediated components, with prominent overlying myofascial component.      Neck/thoracic back pain - Onset of pain around 2021. On exam, positive for myofascial TTP, most notably in lower thoracic paraspinals. Etiology is likely associated with multiple factors, including underlying degenerative changes, with prominent overlying myofascial component.     Abdominal pain - ongoing intermittent episodes for many years, exacerbation in the past year s/p hernia repair August 2024. Etiology not entirely clear and likely mixed. Has hx of GERD and presumable underlying IBS.        Today's visit:   History of radiofrequency ablation (RFA) of nerve of cervical spine:  - Previous neck ablation did not alleviate symptoms.  - Consider cervical facet joint injections 30+ days after steroid course if symptoms persist.  - Hypersensitivity to touch noted.  - Try EMLA cream (lidocaine and prilocaine) 3 times a day. Attempt gabapentin topical, though coverage is uncertain.    Chronic neck pain:  Neuritis (s/p RFA):  Allodynia:  - Neck pain with cervicogenic headache.  - Oral steroid burst and topical creams for symptom management. Consider facet joint injections if no improvement.  - Hypersensitive to light touch after cervical RFA December 2024.   - Steroid burst prescribed, prednisone 60 mg daily for 5 days. Monitor response  and update in 2-3 weeks.  - Risks and side effects: Hot flashes, irritability, increased hunger, insomnia. Take with food to reduce stomach upset.    Muscle spasm:  - Continue baclofen as needed.  - Risks and side effects: baclofen may cause sleepiness.    SI joint dysfunction:   -repeat SI joint injections 6/3/25    Poison ivy exposure:  - Recent exposure with irritation and blisters noted.  - Monitor for worsening symptoms, especially throat irritation. Consider urgent care if symptoms escalate.        Assigned to [place] nursing team.     Visit Diagnoses:  1. History of radiofrequency ablation (RFA) of nerve of cervical spine    2. Allodynia    3. Neuritis    4. Chronic neck pain    5. Spondylosis of cervical region without myelopathy or radiculopathy    6. SI (sacroiliac) joint dysfunction    7. Multiple joint pain    8. Chronic bilateral thoracic back pain    9. Chronic bilateral low back pain with bilateral sciatica    10. Chronic pain syndrome    11. Myofascial pain    12. Muscle spasm          Diagnosis reviewed, treatment option addressed, and risk/benifits discussed.  Self-care instructions given.  I am recommending a multidisciplinary treatment plan to help this patient better manage their pain.      Pain Physical Therapy:  Prior PT for low back/SI and neck pain. Continues home exercise program.      Pain Psychology: Previously worked with Karen Kohler PhD, last visit on 9/30/24.      Diagnostic Studies:  No new orders today.      Medication Management:   Topicals:   EMLA and gabapentin gel - to painful areas posterior neck/upper back  Hyoscyamine 0.125 mg twice daily as needed for abdominal pain. Has been helpful for her, no side effects. Uses consistently twice daily.   Duloxetine -  Current dose 60 mg twice daily. Unclear degree of benefit. Recommend dose reduction to 90 mg daily in morning and will monitor for pain changes. Pending outcome, may consider further taper versus resume 120 mg/day if  determined this is therapeutic dose.   Muscle relaxants:   Cyclobenzaprine 5-10 mg BID PRN  Baclofen 10-20 mg BID PRN  Advised to allow 4-6 hours in between all muscle relaxant doses.      Potential procedures:   Bilateral SI joint injections completed on 1/16/25. 50% pain relief for 2 months. Low back pain returned to baseline, would like to repeat injection, orders placed.   Bilateral cervical 3,4,5 medial branch nerve RFA on 12/17/24. Increased pain, endorses hypersensitivity to light touch.      Other Therapies/Orders/Referrals:   None      Follow up with JEANNIE Schroeder CNP around 8 weeks     Review of Electronic Chart: Today I have also reviewed available medical information in the patient's medical record at [facility] ([facility]) and Care Everywhere (if available), including relevant provider notes, laboratory work, and imaging.     The longitudinal plan of care for the diagnosis(es)/condition(s) as documented were addressed during this visit. Due to the added complexity in care, I will continue to support Linette in the subsequent management and with ongoing continuity of care.    Jenny Landrum, NAOMI, APRN, AGNP-C  [facility]     -------------------------------------------------    Subjective:    Chief complaint:   Chief Complaint   Patient presents with    Pain       Interval history:  Radha Beckwith is a 49 year old female last seen on 5/15/25.      Recommendations/plan at the last visit included:  Pain Physical Therapy:  Prior PT for low back/SI and neck pain. Continues home exercise program.      Pain Psychology: Previously worked with Karen Kohler PhD, last visit on 9/30/24.      Diagnostic Studies:  Rheum labs ordered at last visit, all WNL.      Medication Management:   Hyoscyamine 0.125 mg twice daily as needed for abdominal pain. Has been helpful for her, no side effects. Uses consistently twice daily.   Duloxetine -  Current dose 60 mg twice daily. No reported side  effects. Will reassess benefit at future follow up.   Muscle relaxants:   Orphenadrine caused s/e. Was given cyclobenzaprine by other provider, helpful with use at bedtime.   Refilled in between visits for 5-10 mg BID PRN  Resumed baclofen using leftover supply at home. May increase dose to 10-20 mg BID PRN  Advised to allow 4-6 hours in between all muscle relaxant doses.   Medical cannabis - [place] on 1[place]3/24. No clear benefit for pain with daytime products, some benefit at night with products used at bedtime. Could consider re-certification in future if interested.      Potential procedures:   Bilateral SI joint injections completed on 1/16/25. 50% pain relief for 2 months. Low back pain returned to baseline, would like to repeat injection, orders placed.   Bilateral cervical 3,4,5 medial branch nerve RFA on 12/17/24. Increased pain, endorses hypersensitivity to light touch.      Other Therapies/Orders/Referrals:   None      Follow up with JEANNIE Schroeder CNP 4 weeks after SI joint injection, in person visit recommended.     Since her last visit, Radha Beckwith reports:  History of Present Illness-  Linette MASSEY Amena, 49 years, female  - Neck ablation done in December, resulted in hypersensitivity to touch, no improvement  - SI joint shots received on 6/3, prescription for neck medication not received  - Exposure to poison ivy a few days ago, resulting in painful blisters on tongue and throat  - History of TMJ, headaches sometimes travel to neck and behind eyes  - Mid-back pain, tightness, and discomfort during drive  - Dry mouth, worsened by coffee consumption  - Previous use of duloxetine, initially effective but pain returned and unclear degree of benefit at 60 mg BID.   - Baclofen taken as needed, causes tiredness      PEG: A Three-Item Scale Assessing Pain Intensity and Interference    What number best describes your PAIN ON AVERAGE in the past week? (Patient-Rptd)  7    What number best describes how, during the past week, pain has interfered with your ENJOYMENT OF LIFE? (Patient-Rptd) 8    What number best describes how, during the past week, pain has interfered with your GENERAL ACTIVITY? (Patient-Rptd) 8    PEG Total Score: (Patient-Rptd) 7.67    aFbian VERA, Hayden KA, Karie MJ, Anjana TA, Álvarez J, Joyce JM, Ban SM, Brandie K. Development and initial validation of the PEG, a 3-item scale assessing pain intensity and interference. Journal of General Internal Medicine. 2009 [place];24:733-738.        HPI/Interval Hx from last visit:  -She is having trouble with cell phone connection right now. Issues with [employer].   -Clinic staff was unable to reach her this morning via telephone.   -The visit was initially scheduled in person, then converted to video visit.   -She has increased pain with longer car rides.   -Last SI joint injections 1/16/25. Thinks this may have been somewhat helpful. Though increased low back pain in the past 2-2.5 months. She thinks she had up to 50% pain relief for about 2 months.   -She was recently dx with TMJ.   -She is having more neck pain since cervical RFA in December 2024. She is having hypersensitivity to light touch.   -She is going to chiropractor again weekly. They have targeted SI joint to an extent.   -had side effects with orphenadrine. Another provider prescribed Flexeril.   -She tried baclofen again with leftover supply. Not sure how effective, but this was at lower dose.       Current Pain Treatments:      Suboxone 2-0.5 mg daily (managed by different provider)   Ropinirole 1 mg at bedtime                 Hyoscyamine 0.125 mg BID PRN  Duloxetine 90 mg daily   Cyclobenzaprine 5-10 mg BID PRN  Baclofen 10-20 mg BID PRN    TENS  PT - HEP   CMBB/RFA on 12/17/24  Bilateral SI joint injections on 6/3/25        Current MME: N/A     Review of [place] Prescription Monitoring Program (): YES      Past pain treatments:  SI joint injection 5/16/23  -significant benefit x1 month, then diminishing efficacy  KINDRA on 4/19/24 with Dr. Ivory - marginal benefit   Gabapentin   Nortriptyline 10-20 mg at bedtime  Orphenadrine      Medications:  Current Outpatient Medications   Medication Sig Dispense Refill    acetaminophen (TYLENOL) 500 MG tablet Take 1,000 mg by mouth every 6 hours as needed for mild pain      ALPRAZolam (XANAX) 0.5 MG tablet       baclofen (LIORESAL) 10 MG tablet Take 1-2 tablets (10-20 mg) by mouth 2 times daily as needed for muscle spasms. Allow 4-6 hours in between all muscle relaxants. 90 tablet 1    benzocaine (AMERICAINE) 20 % rectal ointment Place rectally every 3 hours as needed for hemorrhoids. 28 g 1    bisacodyl (DULCOLAX) 10 MG suppository Place 1 suppository (10 mg) rectally daily as needed for constipation. 30 suppository 1    bisacodyl (DULCOLAX) 5 MG EC tablet Take 1 tablet (5 mg) by mouth every other day. Take every other day. If no bowel movement for 2 or more days, take 2 tablets of bisacodyl (10 mg) 30 tablet 0    buprenorphine HCl-naloxone HCl (SUBOXONE) 2-0.5 MG per film Place 0.5 Film under the tongue daily       busPIRone HCl (BUSPAR) 30 MG tablet 30 mg 3 times daily      cyclobenzaprine (FLEXERIL) 5 MG tablet Take 1-2 tablets (5-10 mg) by mouth 2 times daily as needed for muscle spasms.      diclofenac (VOLTAREN) 1 % topical gel Apply 4 g topically 4 times daily. 350 g 2    DULoxetine (CYMBALTA) 30 MG capsule Take 90 mg daily in morning (60 mg and 30 mg capsules together). 30 capsule 1    DULoxetine (CYMBALTA) 60 MG capsule Take 90 mg daily in morning (60 mg and 30 mg capsules together). 30 capsule 1    famotidine (PEPCID) 40 MG tablet Take 1 tablet (40 mg) by mouth 2 times daily. 60 tablet 0    gabapentin 8 % in PLO cream Apply 4 clicks (1 g) topically every 8 hours. 90 g 1    hydrocortisone, Perianal, (HYDROCORTISONE) 2.5 % cream Place rectally 2 times daily as needed for hemorrhoids. 30 g 3    hyoscyamine (LEVSIN) 0.125  MG tablet Take 1 tablet (125 mcg) by mouth every 6 hours as needed for cramping. 60 tablet 5    lactulose (CONSTULOSE) 10 GM/15ML solution Take 15 mLs (10 g) by mouth 3 times daily as needed for constipation ((as needed for severe constipation, no BM for more than 3 days and feeling constipated).). 300 mL 3    lidocaine-prilocaine (EMLA) 2.5-2.5 % external cream Apply topically 3 times daily as needed for moderate pain. 30 g 1    linaclotide (LINZESS) 290 MCG capsule Take 1 capsule (290 mcg) by mouth every morning (before breakfast). 30 capsule 11    ondansetron (ZOFRAN ODT) 4 MG ODT tab DISSOLVE ONE TABLET BY MOUTH EVERY 6 HOURS AS NEEDED FOR NAUSEA 20 tablet 3    pantoprazole (PROTONIX) 40 MG EC tablet Take 1 tablet (40 mg) by mouth 2 times daily (with meals). 180 tablet 2    predniSONE (DELTASONE) 20 MG tablet Take 3 tablets (60 mg) by mouth daily. X 5 days. 15 tablet 0    rOPINIRole (REQUIP) 1 MG tablet Take 1 tablet (1 mg) by mouth at bedtime. 90 tablet 1    simvastatin (ZOCOR) 10 MG tablet TAKE ONE TABLET BY MOUTH EVERY NIGHT AT BEDTIME 90 tablet 1    temazepam (RESTORIL) 15 MG capsule       medical cannabis (Patient's own supply) See Admin Instructions. Has [place] Medical Cann[place] Card- Purchases through [facility].   (The purpose of this order is to document that the patient reports taking medical cannabis.  This is not a prescription, and is not used to certify that the patient has a qualifying medical condition.) (Patient not taking: Reported on 6/19/2025)      methylPREDNISolone (MEDROL DOSEPAK) 4 MG tablet therapy pack Follow Package Directions 21 tablet 0       Medical History: any changes in medical history since they were last seen? No      Objective:    Physical Exam:  Blood pressure (!) 156/100, pulse 89, not currently breastfeeding.  Constitutional: Alert, no signs of acute distress.   Gait: Intact   HEENT: Head atraumatic, normocephalic. Eyes without conjunctival injection or jaundice.    Skin: No rash, lesions, or petechiae of exposed skin.   Psychiatric/mental status: Appropriate affect. Able to follow commands without difficulty. Appropriately engaged throughout visit.   MSK/neuro: positive allodynia and myofascial TTP ML low cervical/high thoracic region.     Diagnostic Tests/Imaging/Labs:  Reviewed CBC and CMP from 12/25/24 - GFR 66, CBC and LFTs WNL    BILLING TIME DOCUMENTATION:   The total TIME spent on this patient on the date of the encounter/appointment was 60 minutes.      TOTAL TIME includes:   Time spent preparing to see the patient (reviewing records and tests)   Time spent face to face (or over the phone) with the patient   Time spent ordering tests, medications, procedures and referrals   Time spent Referring and communicating with other healthcare professionals   Time spent documenting clinical information in Epic

## 2025-06-19 ENCOUNTER — OFFICE VISIT (OUTPATIENT)
Dept: PALLIATIVE MEDICINE | Facility: CLINIC | Age: 50
End: 2025-06-19
Payer: COMMERCIAL

## 2025-06-19 VITALS — SYSTOLIC BLOOD PRESSURE: 156 MMHG | DIASTOLIC BLOOD PRESSURE: 100 MMHG | HEART RATE: 89 BPM

## 2025-06-19 DIAGNOSIS — M25.50 MULTIPLE JOINT PAIN: ICD-10-CM

## 2025-06-19 DIAGNOSIS — G89.29 CHRONIC BILATERAL LOW BACK PAIN WITH BILATERAL SCIATICA: ICD-10-CM

## 2025-06-19 DIAGNOSIS — M54.6 CHRONIC BILATERAL THORACIC BACK PAIN: ICD-10-CM

## 2025-06-19 DIAGNOSIS — M47.812 SPONDYLOSIS OF CERVICAL REGION WITHOUT MYELOPATHY OR RADICULOPATHY: ICD-10-CM

## 2025-06-19 DIAGNOSIS — M54.2 CHRONIC NECK PAIN: ICD-10-CM

## 2025-06-19 DIAGNOSIS — M54.42 CHRONIC BILATERAL LOW BACK PAIN WITH BILATERAL SCIATICA: ICD-10-CM

## 2025-06-19 DIAGNOSIS — G89.4 CHRONIC PAIN SYNDROME: ICD-10-CM

## 2025-06-19 DIAGNOSIS — G89.29 CHRONIC BILATERAL THORACIC BACK PAIN: ICD-10-CM

## 2025-06-19 DIAGNOSIS — Z98.890: Primary | ICD-10-CM

## 2025-06-19 DIAGNOSIS — R20.8 ALLODYNIA: ICD-10-CM

## 2025-06-19 DIAGNOSIS — G89.29 CHRONIC NECK PAIN: ICD-10-CM

## 2025-06-19 DIAGNOSIS — M53.3 SI (SACROILIAC) JOINT DYSFUNCTION: ICD-10-CM

## 2025-06-19 DIAGNOSIS — M62.838 MUSCLE SPASM: ICD-10-CM

## 2025-06-19 DIAGNOSIS — M79.2 NEURITIS: ICD-10-CM

## 2025-06-19 DIAGNOSIS — M79.18 MYOFASCIAL PAIN: ICD-10-CM

## 2025-06-19 DIAGNOSIS — M54.41 CHRONIC BILATERAL LOW BACK PAIN WITH BILATERAL SCIATICA: ICD-10-CM

## 2025-06-19 RX ORDER — BACLOFEN 10 MG/1
10-20 TABLET ORAL 2 TIMES DAILY PRN
Qty: 90 TABLET | Refills: 1 | Status: SHIPPED | OUTPATIENT
Start: 2025-06-19

## 2025-06-19 RX ORDER — DULOXETIN HYDROCHLORIDE 30 MG/1
CAPSULE, DELAYED RELEASE ORAL
Qty: 30 CAPSULE | Refills: 1 | Status: SHIPPED | OUTPATIENT
Start: 2025-06-19

## 2025-06-19 RX ORDER — DULOXETIN HYDROCHLORIDE 60 MG/1
CAPSULE, DELAYED RELEASE ORAL
Qty: 30 CAPSULE | Refills: 1 | Status: SHIPPED | OUTPATIENT
Start: 2025-06-19

## 2025-06-19 RX ORDER — PREDNISONE 20 MG/1
60 TABLET ORAL DAILY
Qty: 15 TABLET | Refills: 0 | Status: SHIPPED | OUTPATIENT
Start: 2025-06-19

## 2025-06-19 RX ORDER — LIDOCAINE AND PRILOCAINE 25; 25 MG/G; MG/G
CREAM TOPICAL 3 TIMES DAILY PRN
Qty: 30 G | Refills: 1 | Status: SHIPPED | OUTPATIENT
Start: 2025-06-19

## 2025-06-19 ASSESSMENT — PAIN SCALES - PAIN ENJOYMENT GENERAL ACTIVITY SCALE (PEG)
INTERFERED_GENERAL_ACTIVITY: 8
AVG_PAIN_PASTWEEK: 7
PEG_TOTALSCORE: 7.67
INTERFERED_ENJOYMENT_LIFE: 8

## 2025-06-19 ASSESSMENT — PAIN SCALES - GENERAL: PAINLEVEL_OUTOF10: SEVERE PAIN (7)

## 2025-06-19 NOTE — PATIENT INSTRUCTIONS
Reduce duloxetine to 90 mg daily and take in morning. Monitor for pain changes. Contact clinic with concerns, worsening pain symptoms or mood changes.   EMLA cream and topical gabapentin gel prescribed today. Insurance may not cover. Notify clinic if gabapentin prescription needs to be resent to compounding pharmacy.   Prednisone 60 mg daily in morning x 5 days. Take with food. Common side effects include increased appetite, irritability, sweating, GI upset, sleep disturbance. Contact clinic with concerns.   Send update in 2-3 weeks. May consider facet joint injections 1+ month after steroid burst completed if needed.   Follow up in August (around 8 weeks)     ----------------------------------------------------------------  Clinic Number:  [phone]   Call with any questions about your care and for scheduling assistance.   Calls are returned Monday through Friday between 8 AM and 4:30 PM. We usually get back to you within 2 business days depending on the issue/request.    If we are prescribing your medications:  For opioid medication refills, call the clinic or send a [employer] message 7 days in advance.  Please include:  Name of requested medication  Name of the pharmacy.  For non-opioid medications, call your pharmacy directly to request a refill. Please allow 3-4 days to be processed.   Per [place] State Law:  All controlled substance prescriptions must be filled within 30 days of being written.    For those controlled substances allowing refills, pickup must occur within 30 days of last fill.      We believe regular attendance is key to your success in our program!    Any time you are unable to keep your appointment we ask that you call us at least 24 hours in advance to cancel.This will allow us to offer the appointment time to another patient.   Multiple missed appointments may lead to dismissal from the clinic.

## 2025-06-20 ENCOUNTER — HOSPITAL ENCOUNTER (EMERGENCY)
Facility: CLINIC | Age: 50
Discharge: HOME | End: 2025-06-20
Attending: NURSE PRACTITIONER | Admitting: NURSE PRACTITIONER
Payer: COMMERCIAL

## 2025-06-20 VITALS
OXYGEN SATURATION: 100 % | BODY MASS INDEX: 28.32 KG/M2 | RESPIRATION RATE: 20 BRPM | WEIGHT: 165 LBS | DIASTOLIC BLOOD PRESSURE: 111 MMHG | SYSTOLIC BLOOD PRESSURE: 146 MMHG | HEART RATE: 113 BPM | TEMPERATURE: 97.8 F

## 2025-06-20 DIAGNOSIS — R20.8 ALLODYNIA: ICD-10-CM

## 2025-06-20 DIAGNOSIS — L23.7 POISON IVY DERMATITIS: ICD-10-CM

## 2025-06-20 DIAGNOSIS — L30.4 INTERTRIGO: ICD-10-CM

## 2025-06-20 DIAGNOSIS — M79.2 NEURITIS: ICD-10-CM

## 2025-06-20 DIAGNOSIS — Z98.890: ICD-10-CM

## 2025-06-20 LAB
ANION GAP SERPL CALCULATED.3IONS-SCNC: 8 MMOL/L (ref 7–15)
BASOPHILS # BLD AUTO: 0 10E3/UL (ref 0–0.2)
BASOPHILS NFR BLD AUTO: 0 %
BUN SERPL-MCNC: 16.9 MG/DL (ref 6–20)
CALCIUM SERPL-MCNC: 9.2 MG/DL (ref 8.8–10.4)
CHLORIDE SERPL-SCNC: 101 MMOL/L (ref 98–107)
CREAT SERPL-MCNC: 0.69 MG/DL (ref 0.51–0.95)
EGFRCR SERPLBLD CKD-EPI 2021: >90 ML/MIN/1.73M2
EOSINOPHIL # BLD AUTO: 0 10E3/UL (ref 0–0.7)
EOSINOPHIL NFR BLD AUTO: 0 %
ERYTHROCYTE [DISTWIDTH] IN BLOOD BY AUTOMATED COUNT: 12.3 % (ref 10–15)
GLUCOSE SERPL-MCNC: 156 MG/DL (ref 70–99)
HCO3 SERPL-SCNC: 29 MMOL/L (ref 22–29)
HCT VFR BLD AUTO: 40.9 % (ref 35–47)
HGB BLD-MCNC: 14 G/DL (ref 11.7–15.7)
IMM GRANULOCYTES # BLD: 0 10E3/UL
IMM GRANULOCYTES NFR BLD: 1 %
LYMPHOCYTES # BLD AUTO: 1.3 10E3/UL (ref 0.8–5.3)
LYMPHOCYTES NFR BLD AUTO: 17 %
MCH RBC QN AUTO: 30.1 PG (ref 26.5–33)
MCHC RBC AUTO-ENTMCNC: 34.2 G/DL (ref 31.5–36.5)
MCV RBC AUTO: 88 FL (ref 78–100)
MONOCYTES # BLD AUTO: 0.2 10E3/UL (ref 0–1.3)
MONOCYTES NFR BLD AUTO: 2 %
NEUTROPHILS # BLD AUTO: 6.3 10E3/UL (ref 1.6–8.3)
NEUTROPHILS NFR BLD AUTO: 80 %
NRBC # BLD AUTO: 0 10E3/UL
NRBC BLD AUTO-RTO: 0 /100
PLATELET # BLD AUTO: 224 10E3/UL (ref 150–450)
POTASSIUM SERPL-SCNC: 4.3 MMOL/L (ref 3.4–5.3)
RBC # BLD AUTO: 4.65 10E6/UL (ref 3.8–5.2)
SODIUM SERPL-SCNC: 138 MMOL/L (ref 135–145)
WBC # BLD AUTO: 7.8 10E3/UL (ref 4–11)

## 2025-06-20 PROCEDURE — 85004 AUTOMATED DIFF WBC COUNT: CPT | Performed by: NURSE PRACTITIONER

## 2025-06-20 PROCEDURE — 250N000013 HC RX MED GY IP 250 OP 250 PS 637: Performed by: NURSE PRACTITIONER

## 2025-06-20 PROCEDURE — 36415 COLL VENOUS BLD VENIPUNCTURE: CPT | Performed by: NURSE PRACTITIONER

## 2025-06-20 PROCEDURE — 96360 HYDRATION IV INFUSION INIT: CPT

## 2025-06-20 PROCEDURE — 258N000003 HC RX IP 258 OP 636: Performed by: NURSE PRACTITIONER

## 2025-06-20 PROCEDURE — 99284 EMERGENCY DEPT VISIT MOD MDM: CPT

## 2025-06-20 PROCEDURE — 80048 BASIC METABOLIC PNL TOTAL CA: CPT | Performed by: NURSE PRACTITIONER

## 2025-06-20 PROCEDURE — 99283 EMERGENCY DEPT VISIT LOW MDM: CPT | Performed by: NURSE PRACTITIONER

## 2025-06-20 RX ORDER — ALPRAZOLAM 0.5 MG
1 TABLET ORAL ONCE
Status: COMPLETED | OUTPATIENT
Start: 2025-06-20 | End: 2025-06-20

## 2025-06-20 RX ORDER — CLOTRIMAZOLE 1 %
CREAM (GRAM) TOPICAL 2 TIMES DAILY
Status: DISCONTINUED | OUTPATIENT
Start: 2025-06-20 | End: 2025-06-20

## 2025-06-20 RX ORDER — CLOTRIMAZOLE 1 %
CREAM (GRAM) TOPICAL 2 TIMES DAILY
Status: DISCONTINUED | OUTPATIENT
Start: 2025-06-20 | End: 2025-06-21 | Stop reason: HOSPADM

## 2025-06-20 RX ORDER — CLOTRIMAZOLE 1 %
CREAM (GRAM) TOPICAL 2 TIMES DAILY
Qty: 15 G | Refills: 0 | Status: SHIPPED | OUTPATIENT
Start: 2025-06-20

## 2025-06-20 RX ORDER — PREDNISONE 20 MG/1
60 TABLET ORAL DAILY
COMMUNITY
Start: 2025-06-20

## 2025-06-20 RX ORDER — CETIRIZINE HYDROCHLORIDE 10 MG/1
10 TABLET ORAL ONCE
Status: COMPLETED | OUTPATIENT
Start: 2025-06-20 | End: 2025-06-20

## 2025-06-20 RX ORDER — PREDNISONE 20 MG/1
20 TABLET ORAL DAILY
Qty: 3 TABLET | Refills: 0 | Status: SHIPPED | OUTPATIENT
Start: 2025-06-20 | End: 2025-06-23

## 2025-06-20 RX ADMIN — SODIUM CHLORIDE 1000 ML: 0.9 INJECTION, SOLUTION INTRAVENOUS at 21:09

## 2025-06-20 RX ADMIN — CETIRIZINE HYDROCHLORIDE 10 MG: 10 TABLET, FILM COATED ORAL at 21:22

## 2025-06-20 RX ADMIN — CLOTRIMAZOLE: 10 CREAM TOPICAL at 21:22

## 2025-06-20 RX ADMIN — ALPRAZOLAM 1 MG: 0.5 TABLET ORAL at 21:22

## 2025-06-20 ASSESSMENT — ACTIVITIES OF DAILY LIVING (ADL)
ADLS_ACUITY_SCORE: 42
ADLS_ACUITY_SCORE: 42

## 2025-06-20 ASSESSMENT — COLUMBIA-SUICIDE SEVERITY RATING SCALE - C-SSRS
2. HAVE YOU ACTUALLY HAD ANY THOUGHTS OF KILLING YOURSELF IN THE PAST MONTH?: NO
6. HAVE YOU EVER DONE ANYTHING, STARTED TO DO ANYTHING, OR PREPARED TO DO ANYTHING TO END YOUR LIFE?: NO
1. IN THE PAST MONTH, HAVE YOU WISHED YOU WERE DEAD OR WISHED YOU COULD GO TO SLEEP AND NOT WAKE UP?: NO

## 2025-06-21 NOTE — ED TRIAGE NOTES
Patient presents with concern for poison ivy rash in mouth, tongue, under R breast, arms and legs. Started yesterday.

## 2025-06-21 NOTE — DISCHARGE INSTRUCTIONS
Prednisone TAPER:  60 mg daily (3 tablets) for 3 days, then  40 mg (2 tablets) daily for 3 days, then  20 mg (1 tablet) daily for 3 days.    Cetirizine 10 mg twice a day for 5 days.    Clotrimazole 1% cream twice a day for 2 weeks to rash under the right breast.    Magic mouthwash 10 ml swish and spit four times a day as needed for mouth pain.  Return for any new or worsening symptoms.

## 2025-06-21 NOTE — ED PROVIDER NOTES
History     Chief Complaint   Patient presents with    Rash     HPI  Radha Beckwith is a 49 year old female who presents for evaluation of rash.  Patient was exposed to poison ivy few days ago.  She developed scattered rash on both her legs, both arms, and upper lip.  She was prescribed prednisone 60 mg daily for 5 days.  She has taken 1 dose so far.  She presents here today with complaint worsening of the rash.  She states its inside her mouth and has noted a rash underneath her right breast.  Additionally, she feels very anxious and normally uses Xanax.  Last dose was taken this morning.  She is not taking any new medications.     Allergies:  Allergies   Allergen Reactions    Compazine Anxiety and Palpitations     Severe anxiety attack    Droperidol Anxiety and Palpitations     Severe anxiety attack    Nubain [Nalbuphine Hcl] Anxiety and Palpitations     Severe anxiety attack    Ergotamine-Caffeine      12-            GI problems-    Seasonal Allergies     Sumatriptan      vomits after giving herself a shot    Prochlorperazine Anxiety and Palpitations     Uncontrolled movement, severe anxiety attack       Problem List:    Patient Active Problem List    Diagnosis Date Noted    Severe episode of recurrent major depressive disorder, without psychotic features (H) 04/07/2025     Priority: Medium    Otalgia, bilateral 03/26/2025     Priority: Medium    Sensorineural hearing loss, bilateral 03/26/2025     Priority: Medium    Pulsatile tinnitus of both ears 03/26/2025     Priority: Medium    TMJ (temporomandibular joint syndrome) 03/26/2025     Priority: Medium    Panic disorder 03/21/2025     Priority: Medium    PTSD (post-traumatic stress disorder) 03/21/2025     Priority: Medium    Muscle spasm 12/27/2024     Priority: Medium    Female pelvic pain 07/23/2024     Priority: Medium     Have had for sometime. Able to discuss for first visit with Clare Carmona, OB/GYN      Mastalgia in female 07/23/2024      Priority: Medium     Have had for a few years. First time reporting to OB/GYN      Drug induced constipation 06/25/2024     Priority: Medium    Nightmares associated with chronic post-traumatic stress disorder 05/01/2024     Priority: Medium     Started seeing psychiatrist on 04/11/2024      Social anxiety disorder 05/01/2024     Priority: Medium    Cervicalgia 04/02/2024     Priority: Medium    Cervical radiculopathy 02/14/2024     Priority: Medium    Chronic bilateral thoracic back pain 02/14/2024     Priority: Medium    Lumbar radiculopathy 02/14/2024     Priority: Medium    Pain of both sacroiliac joints 02/14/2024     Priority: Medium    Radicular pain of upper extremity 02/14/2024     Priority: Medium    Somatic dysfunction of pelvic region 01/10/2024     Priority: Medium    Myofascial pain 01/10/2024     Priority: Medium    Generalized anxiety disorder 11/27/2023     Priority: Medium    Ringing in ears 07/01/2023     Priority: Medium    Chronic low back pain with bilateral sciatica 06/15/2023     Priority: Medium    Chronic neck pain 06/15/2023     Priority: Medium    Moderate episode of recurrent major depressive disorder (H) 06/29/2022     Priority: Medium    Supraventricular tachycardia 06/03/2019     Priority: Medium    Psychophysiological insomnia 12/30/2017     Priority: Medium    Chronic bilateral low back pain without sciatica 12/30/2017     Priority: Medium    Opioid dependence in remission (H) 08/02/2015     Priority: Medium     On suboxone treatement with Bryant Harkins.  (Noted in 2015).        Anxiety attack 07/31/2015     Priority: Medium    Hypokalemia 06/23/2015     Priority: Medium    Tobacco abuse 06/23/2015     Priority: Medium    Hyperlipidemia LDL goal <100 01/29/2014     Priority: Medium    HL (hearing loss) 06/01/2013     Priority: Medium     Received first pair of hearing aids around July of 2013      GERD (gastroesophageal reflux disease) 07/28/2010     Priority:  Medium     Takes omeprazole and metoclopramide      Restless legs 07/28/2010     Priority: Medium    Migraine 08/07/2003     Priority: Medium        Past Medical History:    Past Medical History:   Diagnosis Date    Abdominal pain, right lower quadrant 03/09/2008    Anxiety attack 07/31/2015    Atypical chest pain 06/23/2015    De Quervain's disease (tenosynovitis)     Dehydration     Depressive disorder 1996    Gastric ulcer 07/31/2015    GERD (gastroesophageal reflux disease) 07/28/2010    Hypertension 2017    Ingrowing nail 01/09/2014    Migraines     Opioid dependence in remission (H) 08/02/2015    Other and unspecified ovarian cyst     Papanicolaou smear of cervix with low grade squamous intraepithelial lesion (LGSIL) 07/07/2017       Past Surgical History:    Past Surgical History:   Procedure Laterality Date    BIOPSY CERVICAL, LOCAL EXCISION, SINGLE/MULTIPLE N/A 8/10/2017    Procedure: BIOPSY CERVICAL, LOCAL EXCISION, SINGLE/MULTIPLE;;  Surgeon: Michael Chandler MD;  [facility]    COLONOSCOPY N/A 1/6/2023    Procedure: COLONOSCOPY;  Surgeon: Michael Dozier MD;  [facility]    COLONOSCOPY N/A 1/28/2025    Procedure: COLONOSCOPY, WITH POLYPECTOMY AND BIOPSY;  Surgeon: John Paul Varela MD;  [facility]    COLPOSCOPY, BIOPSY, COMBINED N/A 8/10/2017    Procedure: COMBINED COLPOSCOPY, BIOPSY;  Colposcopy with Cervical Biopsies and Endometrial Biopsy, Exam with Ultrasound;  Surgeon: Michael Chandler MD;  [facility]    ESOPHAGOSCOPY, GASTROSCOPY, DUODENOSCOPY (EGD), COMBINED N/A 4/17/2017    Procedure: COMBINED ESOPHAGOSCOPY, GASTROSCOPY, DUODENOSCOPY (EGD);  Surgeon: Ibrahima Esposito MD;  [facility]    ESOPHAGOSCOPY, GASTROSCOPY, DUODENOSCOPY (EGD), COMBINED N/A 1/6/2023    Procedure: ESOPHAGOGASTRODUODENOSCOPY, WITH BIOPSY;  Surgeon: Michael Dozier MD;  [facility]    ESOPHAGOSCOPY, GASTROSCOPY, DUODENOSCOPY (EGD), COMBINED N/A 10/3/2023    Procedure:  ESOPHAGOGASTRODUODENOSCOPY, WITH BIOPSY;  Surgeon: John Paul Varela MD;  [facility]    EXAM UNDER ANESTHESIA PELVIC N/A 8/10/2017    Procedure: EXAM UNDER ANESTHESIA PELVIC;;  Surgeon: Michael Chandler MD;  [facility]    HERNIORRHAPHY, INCISIONAL, ROBOT-ASSISTED, LAPAROSCOPIC, USING DA JERRELL XI N/A 2024    Procedure: HERNIORRHAPHY, INCISIONAL, ROBOT-ASSISTED, LAPAROSCOPIC, USING DA JERRLEL XI;  Surgeon: Michael Dozier MD;  [facility]    HYSTERECTOMY      HYSTERECTOMY, PAP NO LONGER INDICATED      INJECT EPIDURAL CERVICAL N/A 10/20/2023    Procedure: Cervical 6-7 Interlaminar epidural steroid injection using fluoroscopic guidance with contrast dye.;  Surgeon: Trevor Ivory MD;  [facility]    INJECT EPIDURAL CERVICAL N/A 2024    Procedure: Cervical 6 - Cervical 7 Interlaminar epidural steroid injection using fluoroscopic guidance with contrast dye.;  Surgeon: Trevor Ivory MD;  [facility]    INJECT EPIDURAL LUMBAR Bilateral 2022    Procedure: Lumbar 5-Sacral 1 Transforaminal Epidural Steroid Injection with fluoroscopic guidance and contrast, bilateral;  Surgeon: Trevor Ivory MD;  [facility]    LAPAROSCOPIC CHOLECYSTECTOMY N/A 2018    Procedure: LAPAROSCOPIC CHOLECYSTECTOMY;  Laparoscopic Cholecystectomy;  Surgeon: Tigre Lowry DO;  [facility]    LAPAROSCOPIC HYSTERECTOMY TOTAL N/A 10/30/2017    Procedure: LAPAROSCOPIC HYSTERECTOMY TOTAL;  LAPAROSCOPIC HYSTERECTOMY TOTAL POSSIBLE SALPINGO-OOPHERECTOMY (BILATERAL);  Surgeon: Michael Chandler MD;  [facility]    ZZHC UGI ENDOSCOPY, SIMPLE EXAM  08       Family History:    Family History   Problem Relation Age of Onset    Depression Mother     Respiratory Mother     Chronic Obstructive Pulmonary Disease Mother     Hypertension Mother     Anxiety Disorder Mother     Cerebrovascular Disease Father         First stroke at age 28,  from 2nd when he was 55. Brain  aneurysms.    Breast Cancer Cousin     Adrenal Disorder Other     Chronic Obstructive Pulmonary Disease Other     Asthma Brother        Social History:  Marital Status:  Single [1]  Social History     Tobacco Use    Smoking status: Every Day     Current packs/day: 0.25     Average packs/day: 0.3 packs/day for 20.0 years (5.0 ttl pk-yrs)     Types: Cigarettes     Passive exposure: Never    Smokeless tobacco: Never   Vaping Use    Vaping status: Former    Substances: Nicotine, CBD    Devices: [employer]   Substance Use Topics    Alcohol use: Yes     Comment: Occasionaly    Drug use: No        Medications:    clotrimazole (LOTRIMIN) 1 % external cream  magic mouthwash suspension (diphenhydrAMINE, lidocaine, aluminum-magnesium & simethicone)  predniSONE (DELTASONE) 20 MG tablet  predniSONE (DELTASONE) 20 MG tablet  acetaminophen (TYLENOL) 500 MG tablet  ALPRAZolam (XANAX) 0.5 MG tablet  baclofen (LIORESAL) 10 MG tablet  benzocaine (AMERICAINE) 20 % rectal ointment  bisacodyl (DULCOLAX) 10 MG suppository  bisacodyl (DULCOLAX) 5 MG EC tablet  buprenorphine HCl-naloxone HCl (SUBOXONE) 2-0.5 MG per film  busPIRone HCl (BUSPAR) 30 MG tablet  cyclobenzaprine (FLEXERIL) 5 MG tablet  diclofenac (VOLTAREN) 1 % topical gel  DULoxetine (CYMBALTA) 30 MG capsule  DULoxetine (CYMBALTA) 60 MG capsule  famotidine (PEPCID) 40 MG tablet  gabapentin 8 % in PLO cream  hydrocortisone, Perianal, (HYDROCORTISONE) 2.5 % cream  hyoscyamine (LEVSIN) 0.125 MG tablet  lactulose (CONSTULOSE) 10 GM/15ML solution  lidocaine-prilocaine (EMLA) 2.5-2.5 % external cream  linaclotide (LINZESS) 290 MCG capsule  medical cannabis (Patient's own supply)  methylPREDNISolone (MEDROL DOSEPAK) 4 MG tablet therapy pack  ondansetron (ZOFRAN ODT) 4 MG ODT tab  pantoprazole (PROTONIX) 40 MG EC tablet  rOPINIRole (REQUIP) 1 MG tablet  simvastatin (ZOCOR) 10 MG tablet  temazepam (RESTORIL) 15 MG capsule          Review of Systems  As mentioned above in the history  present illness. All other systems were reviewed and are negative.    Physical Exam   BP: (!) 146/111  Pulse: 113  Temp: 97.8  F (36.6  C)  Resp: 20  Weight: 74.8 kg (165 lb)  SpO2: 100 %      Physical Exam  Constitutional:       General: She is not in acute distress.     Appearance: Normal appearance. She is well-developed. She is not ill-appearing.   HENT:      Head: Normocephalic and atraumatic.      Right Ear: Tympanic membrane, ear canal and external ear normal.      Left Ear: Tympanic membrane, ear canal and external ear normal.      Nose: Nose normal.      Mouth/Throat:      Mouth: Mucous membranes are moist.      Tongue: No lesions.      Palate: No lesions.      Pharynx: Uvula midline. Posterior oropharyngeal erythema present. No uvula swelling.      Comments: I do not appreciate any notable blisters, lesions or ulcerations.   Eyes:      Conjunctiva/sclera: Conjunctivae normal.   Cardiovascular:      Rate and Rhythm: Normal rate and regular rhythm.      Heart sounds: Normal heart sounds. No murmur heard.  Pulmonary:      Effort: Pulmonary effort is normal. No respiratory distress.      Breath sounds: Normal breath sounds.   Abdominal:      General: Bowel sounds are normal. There is no distension.      Palpations: Abdomen is soft.      Tenderness: There is no abdominal tenderness.   Musculoskeletal:         General: Normal range of motion.   Skin:     General: Skin is warm and dry.      Findings: Rash (10cm x 4cm area under right breast. scattered vesicular rash on arms and legs) present.   Neurological:      General: No focal deficit present.      Mental Status: She is alert and oriented to person, place, and time.         ED Course        Procedures         Results for orders placed or performed during the hospital encounter of 06/20/25 (from the past 24 hours)   CBC with platelets differential    Narrative    The following orders were created for panel order CBC with platelets differential.  Procedure                                Abnormality         Status                     ---------                               -----------         ------                     CBC with platelets and ...[[id number]]                      Final result                 Please view results for these tests on the individual orders.   Basic metabolic panel   Result Value Ref Range    Sodium 138 135 - 145 mmol/L    Potassium 4.3 3.4 - 5.3 mmol/L    Chloride 101 98 - 107 mmol/L    Carbon Dioxide (CO2) 29 22 - 29 mmol/L    Anion Gap 8 7 - 15 mmol/L    Urea Nitrogen 16.9 6.0 - 20.0 mg/dL    Creatinine 0.69 0.51 - 0.95 mg/dL    GFR Estimate >90 >60 mL/min/1.73m2    Calcium 9.2 8.8 - 10.4 mg/dL    Glucose 156 (H) 70 - 99 mg/dL   CBC with platelets and differential   Result Value Ref Range    WBC Count 7.8 4.0 - 11.0 10e3/uL    RBC Count 4.65 3.80 - 5.20 10e6/uL    Hemoglobin 14.0 11.7 - 15.7 g/dL    Hematocrit 40.9 35.0 - 47.0 %    MCV 88 78 - 100 fL    MCH 30.1 26.5 - 33.0 pg    MCHC 34.2 31.5 - 36.5 g/dL    RDW 12.3 10.0 - 15.0 %    Platelet Count 224 150 - 450 10e3/uL    % Neutrophils 80 %    % Lymphocytes 17 %    % Monocytes 2 %    % Eosinophils 0 %    % Basophils 0 %    % Immature Granulocytes 1 %    NRBCs per 100 WBC 0 <1 /100    Absolute Neutrophils 6.3 1.6 - 8.3 10e3/uL    Absolute Lymphocytes 1.3 0.8 - 5.3 10e3/uL    Absolute Monocytes 0.2 0.0 - 1.3 10e3/uL    Absolute Eosinophils 0.0 0.0 - 0.7 10e3/uL    Absolute Basophils 0.0 0.0 - 0.2 10e3/uL    Absolute Immature Granulocytes 0.0 <=0.4 10e3/uL    Absolute NRBCs 0.0 10e3/uL     *Note: Due to a large number of results and/or encounters for the requested time period, some results have not been displayed. A complete set of results can be found in Results Review.       Medications   clotrimazole (LOTRIMIN) 1 % cream ( Topical $Given 6/20/25 2122)   sodium chloride 0.9% BOLUS 1,000 mL (0 mLs Intravenous Stopped 6/20/25 2214)   ALPRAZolam (XANAX) tablet 1 mg (1 mg Oral $Given 6/20/25 2122)    cetirizine (zyrTEC) tablet 10 mg (10 mg Oral $Given 6/20/25 2122)       Assessments & Plan (with Medical Decision Making)     49 year old female exposed to poison ivy a few days ago and subsequently a left rash on her arms and legs.  Rash is pruritic.  She was prescribed prednisone 60 mg daily for 5 days yesterday and is taken 1 dose so far.  Rash is a little bit worse and developed pain in her mouth and throat.  She reports small bumps inside of her mouth, but I do not appreciate any ulcerations, blisters, or lesions in the oral cavity or on her lips.  There is scattered urticarial like lesions on her arms and legs.  Under her right breast is a patch of erythema that is not consistent with urticaria, but does appear concerning for fungal rash.      She is taking no new medications.  I considered, but have low suspicion for SJS or other worrisome pathology. Labs reveal no leukocytosis. Blood glucose 156 (related to being on prednisone).    Plan:  Prednisone TAPER:  60 mg daily (3 tablets) for 3 days, then  40 mg (2 tablets) daily for 3 days, then  20 mg (1 tablet) daily for 3 days.    Cetirizine 10 mg twice a day for 5 days.    Clotrimazole 1% cream twice a day for 2 weeks to rash under the right breast.    Magic mouthwash 10 ml swish and spit four times a day as needed for mouth pain.  Return for any new or worsening symptoms.        Discharge Medication List as of 6/20/2025 10:28 PM        START taking these medications    Details   clotrimazole (LOTRIMIN) 1 % external cream Apply topically 2 times daily.Disp-15 g, [id number]      magic mouthwash suspension (diphenhydrAMINE, lidocaine, aluminum-magnesium & simethicone) Swish and spit 10 mLs in mouth every 6 hours as needed (mouth pain)., Disp-200 mL, R-0, E-Prescribe             Final diagnoses:   Poison ivy dermatitis   Intertrigo - under right breast       6/20/2025   [facility] EMERGENCY DEPT       Penny, JEANNIE Vasquez  CNP  06/20/25 1596

## 2025-06-23 ENCOUNTER — VIRTUAL VISIT (OUTPATIENT)
Dept: PSYCHOLOGY | Facility: CLINIC | Age: 50
End: 2025-06-23
Payer: COMMERCIAL

## 2025-06-23 DIAGNOSIS — F33.2 SEVERE EPISODE OF RECURRENT MAJOR DEPRESSIVE DISORDER, WITHOUT PSYCHOTIC FEATURES (H): ICD-10-CM

## 2025-06-23 DIAGNOSIS — F43.10 PTSD (POST-TRAUMATIC STRESS DISORDER): ICD-10-CM

## 2025-06-23 DIAGNOSIS — F41.1 GENERALIZED ANXIETY DISORDER: Primary | ICD-10-CM

## 2025-06-23 PROCEDURE — 90834 PSYTX W PT 45 MINUTES: CPT | Mod: 95 | Performed by: COUNSELOR

## 2025-06-23 ASSESSMENT — ANXIETY QUESTIONNAIRES
GAD7 TOTAL SCORE: 19
GAD7 TOTAL SCORE: 19
7. FEELING AFRAID AS IF SOMETHING AWFUL MIGHT HAPPEN: NEARLY EVERY DAY
6. BECOMING EASILY ANNOYED OR IRRITABLE: MORE THAN HALF THE DAYS
4. TROUBLE RELAXING: NEARLY EVERY DAY
2. NOT BEING ABLE TO STOP OR CONTROL WORRYING: NEARLY EVERY DAY
5. BEING SO RESTLESS THAT IT IS HARD TO SIT STILL: MORE THAN HALF THE DAYS
8. IF YOU CHECKED OFF ANY PROBLEMS, HOW DIFFICULT HAVE THESE MADE IT FOR YOU TO DO YOUR WORK, TAKE CARE OF THINGS AT HOME, OR GET ALONG WITH OTHER PEOPLE?: EXTREMELY DIFFICULT
7. FEELING AFRAID AS IF SOMETHING AWFUL MIGHT HAPPEN: NEARLY EVERY DAY
1. FEELING NERVOUS, ANXIOUS, OR ON EDGE: NEARLY EVERY DAY
GAD7 TOTAL SCORE: 19
3. WORRYING TOO MUCH ABOUT DIFFERENT THINGS: NEARLY EVERY DAY
IF YOU CHECKED OFF ANY PROBLEMS ON THIS QUESTIONNAIRE, HOW DIFFICULT HAVE THESE PROBLEMS MADE IT FOR YOU TO DO YOUR WORK, TAKE CARE OF THINGS AT HOME, OR GET ALONG WITH OTHER PEOPLE: EXTREMELY DIFFICULT

## 2025-06-23 NOTE — PROGRESS NOTES
Answers submitted by the patient for this visit:  Patient Health Questionnaire (Submitted on 6/23/2025)  If you checked off any problems, how difficult have these problems made it for you to do your work, take care of things at home, or get along with other people?: Extremely difficult  PHQ9 TOTAL SCORE: 23  Patient Health Questionnaire (G7) (Submitted on 6/23/2025)  BO 7 TOTAL SCORE: 19          [facility]                                     Progress Note    Patient Name: Radha Beckwith  Date: 6/23/25         Service Type: Individual      Session Start Time: 2:30pm Session End Time: 3:20pm     Session Length:  50    Session #: 84    Attendees: Client    Service Modality:  video      Provider verified identity through the following two step process.  Patient provided:  Patient is known previously to provider    Telemedicine Visit: The patient's condition can be safely assessed and treated via synchronous audio and visual telemedicine encounter.      Reason for Telemedicine Visit: Patient convenience (e.g. access to timely appointments / distance to available provider)    Originating Site (Patient Location): Patient's home    Distant Site (Provider Location): Provider Remote Setting- Home Office    Consent:  The patient/guardian has verbally consented to: the potential risks and benefits of telemedicine (video visit) versus in person care; bill my insurance or make self-payment for services provided; and responsibility for payment of non-covered services.     Patient would like the video invitation sent by:  My Chart    Mode of Communication:  telephone- client did not have access to wi/fi to be able to do video session    Distant Location (Provider):  Off-site home office    As the provider I attest to compliance with applicable laws and regulations related to telemedicine.    There has been demonstrated improvement in functioning while patient has been engaged in psychotherapy/psychological  service- if withdrawn the patient would deteriorate and/or relapse.      DATA  Interactive Complexity: No  Crisis: No        Progress Since Last Session (Related to Symptoms / Goals / Homework):   Symptoms: No change .    Homework: Completed in session      Episode of Care Goals: Satisfactory progress - MAINTENANCE (Working to maintain change, with risk of relapse); Intervened by continuing to positively reinforce healthy behavior choice      Current / Ongoing Stressors and Concerns:   Got poison ivy in her mouth and on her body and had to go to the ER.  Continued stress with boyfriend.         Treatment Objective(s) Addressed in This Session:   Increase interest, engagement, and pleasure in doing things  Feel less tired and more energy during the day        Intervention:   Mostly checked in with the client today. Client's mood has been for the most part stable. Processed recent conflict with boyfriend and how the client chose to respond skillfully.     Assessments completed prior to visit:  The following assessments were completed by patient for this visit:  PHQ9:       3/10/2025     2:30 PM 3/17/2025    11:36 AM 4/21/2025     1:54 PM 5/5/2025    11:49 AM 5/12/2025     2:03 PM 6/16/2025    11:27 AM 6/23/2025     2:05 PM   PHQ-9 SCORE   PHQ-9 Total Score MyChart 22 (Severe depression) 23 (Severe depression) 24 (Severe depression) 24 (Severe depression) 22 (Severe depression) 22 (Severe depression) 23 (Severe depression)   PHQ-9 Total Score 22  23  24  24  22  22  23        Patient-reported     GAD7:       2/5/2025     7:35 PM 2/24/2025    12:03 PM 3/17/2025    11:37 AM 4/21/2025     1:55 PM 5/5/2025    11:50 AM 6/9/2025     2:26 PM 6/23/2025     2:05 PM   BO-7 SCORE   Total Score 20 (severe anxiety) 20 (severe anxiety) 20 (severe anxiety) 20 (severe anxiety) 19 (severe anxiety) 21 (severe anxiety) 19 (severe anxiety)   Total Score 20  20  20  20  19  21  19        Patient-reported     PROMIS 10-Global Health (all  questions and answers displayed):       12/1/2024     4:43 PM 12/16/2024     1:07 PM 1/6/2025     3:31 PM 2/24/2025    12:00 PM 5/27/2025     1:59 PM 6/9/2025     2:28 PM 6/23/2025     2:08 PM   PROMIS 10   In general, would you say your health is: Poor Poor Poor Poor Fair Fair Poor   In general, would you say your quality of life is: Poor Poor Poor Poor Fair Poor Poor   In general, how would you rate your physical health? Poor Poor Fair Poor Poor Fair Poor   In general, how would you rate your mental health, including your mood and your ability to think? Poor Poor Poor Poor Poor Poor Poor   In general, how would you rate your satisfaction with your social activities and relationships? Poor Fair Fair Poor Poor Poor Poor   In general, please rate how well you carry out your usual social activities and roles Poor Poor Poor Poor Poor Poor Poor   To what extent are you able to carry out your everyday physical activities such as walking, climbing stairs, carrying groceries, or moving a chair? A little A little A little A little A little A little A little   In the past 7 days, how often have you been bothered by emotional problems such as feeling anxious, depressed, or irritable? Always Always Always Always Often Always Always   In the past 7 days, how would you rate your fatigue on average? Severe Very severe Very severe Severe Severe Severe Very severe   In the past 7 days, how would you rate your pain on average, where 0 means no pain, and 10 means worst imaginable pain? 8 7 8 7 8 7 8   In general, would you say your health is: 1 1 1 1 2 2 1   In general, would you say your quality of life is: 1 1 1 1 2 1 1   In general, how would you rate your physical health? 1 1 2 1 1 2 1   In general, how would you rate your mental health, including your mood and your ability to think? 1 1 1 1 1 1 1   In general, how would you rate your satisfaction with your social activities and relationships? 1 2 2 1 1 1 1   In general, please  rate how well you carry out your usual social activities and roles. (This includes activities at home, at work and in your community, and responsibilities as a parent, child, spouse, employee, friend, etc.) 1 1 1 1 1 1 1   To what extent are you able to carry out your everyday physical activities such as walking, climbing stairs, carrying groceries, or moving a chair? 2 2 2 2 2 2 2   In the past 7 days, how often have you been bothered by emotional problems such as feeling anxious, depressed, or irritable? 5 5 5 5 4 5 5   In the past 7 days, how would you rate your fatigue on average? 4 5 5 4 4 4 5   In the past 7 days, how would you rate your pain on average, where 0 means no pain, and 10 means worst imaginable pain? 8 7 8 7 8 7 8   Global Mental Health Score 4  5  5  4  6  4  4    Global Physical Health Score 7  6  7  7  7  8  6    PROMIS TOTAL - SUBSCORES 11  11  12  11  13  12  10        Patient-reported     [place] Suicide Severity Rating Scale (Lifetime/Recent)      9/5/2024     5:33 PM 9/13/2024    10:52 AM 10/4/2024     4:54 PM 11/22/2024    11:34 AM 12/25/2024    11:30 PM 1/28/2025     1:23 PM 6/20/2025     8:31 PM   [place] Suicide Severity Rating (Lifetime/Recent)   Q1 Wished to be Dead (Past Month) 0-->no 0-->no 0-->no 0-->no 0-->no 0-->no 0-->no   Q2 Suicidal Thoughts (Past Month) 0-->no 0-->no 0-->no 0-->no 0-->no 0-->no 0-->no   Q6 Suicide Behavior (Lifetime) 1-->yes 0-->no 0-->no 0-->no 0-->no 0-->no 0-->no   If yes to Q6, within past 3 months? 0-->no         Level of Risk per Screen moderate risk  no risks indicated  no risks indicated  no risks indicated  no risks indicated  no risks indicated  no risks indicated       Data saved with a previous flowsheet row definition         ASSESSMENT: Current Emotional / Mental Status (status of significant symptoms):   Risk status (Self / Other harm or suicidal ideation)   Patient denies current fears or concerns for personal safety.   Patient denies  current or recent suicidal ideation or behaviors.   Patient denies current or recent homicidal ideation or behaviors.   Patient denies current or recent self injurious behavior or ideation.   Patient denies other safety concerns.   Patient reports there has been no change in risk factors since their last session.     Patient reports there has been no change in protective factors since their last session.     Recommended that patient call 911 or go to the local ED should there be a change in any of these risk factors.     Appearance:   Appropriate    Eye Contact:   Good    Psychomotor Behavior: Normal    Attitude:   Cooperative    Orientation:   All   Speech    Rate / Production: Normal/ Responsive Normal     Volume:  Normal    Mood:    Normal   Affect:    Appropriate    Thought Content:  Clear    Thought Form:  Coherent    Insight:    Good      Medication Review:   No changes to current psychiatric medication(s)     Medication Compliance:   Yes     Changes in Health Issues:   None reported     Chemical Use Review:   Substance Use: Chemical use reviewed, no active concerns identified      Tobacco Use: No change in amount of tobacco use since last session.  Patient declined discussion at this time    Diagnosis:  1. Generalized anxiety disorder    2. PTSD (post-traumatic stress disorder)    3. Severe episode of recurrent major depressive disorder, without psychotic features (H)            Collateral Reports Completed:   Not Applicable    PLAN: (Patient Tasks / Therapist Tasks / Other)  Continue to utilize self care and stress reduction skills daily.               Ricarda Bhatia, [facility]                                                         ______________________________________________________________________    Individual Treatment Plan    Patient's Name: Radha Beckwith  YOB: 1975    Date of Creation: 6/28/23  Date Treatment Plan Last Reviewed/Revised: 5/5/25    DSM5 Diagnoses: 296.32 (F33.1)  Major Depressive Disorder, Recurrent Episode, Moderate _  Psychosocial / Contextual Factors: living with boyfriend, memory issues  PROMIS (reviewed every 90 days):     Referral / Collaboration:  Referral to another professional/service is not indicated at this time..    Anticipated number of session for this episode of care: 9-12 sessions  Anticipation frequency of session: as needed  Anticipated Duration of each session: 38-52 minutes  Treatment plan will be reviewed in 90 days or when goals have been changed.       MeasurableTreatment Goal(s) related to diagnosis / functional impairment(s)  Goal 1: Patient will increase communication skills with family members.    Objective #A (Patient Action)    Patient will learn & utilize at least 2 assertive communication skills weekly.  Status: Continued - Date(s): 5/5/25    Intervention(s)  Therapist will teach emotional regulation skills. distress tolerance skills, interpersonal effectiveness skills, emotion regluation skills, mindfulness skills, radical acceptance. Therapist will teach client how to ID body cues for anxiety, anxiety reduction techniques, how to ID triggers for depression and anxiety- decrease reactivity/eliminate, lifestyle changes to reduce depression and anxiety, communication skills, explore cognitive beliefs and help client to develop healthy cognitive patterns and beliefs.    Objective #B  Patient will use thought-stopping strategy daily to reduce intrusive thoughts.  Status: Continued - Date(s): 5/5/25    Intervention(s)  Therapist will teach emotional regulation skills. distress tolerance skills, interpersonal effectiveness skills, emotion regluation skills, mindfulness skills, radical acceptance. Therapist will teach client how to ID body cues for anxiety, anxiety reduction techniques, how to ID triggers for depression and anxiety- decrease reactivity/eliminate, lifestyle changes to reduce depression and anxiety, communication skills, explore  cognitive beliefs and help client to develop healthy cognitive patterns and beliefs.      Goal 2: Patient will decrease depression symptoms.      Objective #A (Patient Action)    Status: Continued - Date(s): 5/5/25    Patient will Increase interest, engagement, and pleasure in doing things  Decrease frequency and intensity of feeling down, depressed, hopeless  Improve quantity and quality of night time sleep / decrease daytime naps  Feel less tired and more energy during the day   Improve diet, appetite, mindful eating, and / or meal planning  Identify negative self-talk and behaviors: challenge core beliefs, myths, and actions  Improve concentration, focus, and mindfulness in daily activities   Feel less fidgety, restless or slow in daily activities / interpersonal interactions.    Intervention(s)  Therapist will teach emotional regulation skills. distress tolerance skills, interpersonal effectiveness skills, emotion regluation skills, mindfulness skills, radical acceptance. Therapist will teach client how to ID body cues for anxiety, anxiety reduction techniques, how to ID triggers for depression and anxiety- decrease reactivity/eliminate, lifestyle changes to reduce depression and anxiety, communication skills, explore cognitive beliefs and help client to develop healthy cognitive patterns and beliefs.    Objective #B  Patient will identify three distraction and diversion activities and use those activities to decrease level of anxiety  .    Status: Continued - Date(s):  5/5/25    Intervention(s)  Therapist will teach emotional regulation skills. distress tolerance skills, interpersonal effectiveness skills, emotion regluation skills, mindfulness skills, radical acceptance. Therapist will teach client how to ID body cues for anxiety, anxiety reduction techniques, how to ID triggers for depression and anxiety- decrease reactivity/eliminate, lifestyle changes to reduce depression and anxiety, communication skills,  explore cognitive beliefs and help client to develop healthy cognitive patterns and beliefs.      Patient has reviewed and agreed to the above plan.      ERIK Keller

## 2025-07-07 ENCOUNTER — VIRTUAL VISIT (OUTPATIENT)
Dept: PSYCHOLOGY | Facility: CLINIC | Age: 50
End: 2025-07-07
Payer: COMMERCIAL

## 2025-07-07 DIAGNOSIS — F43.10 PTSD (POST-TRAUMATIC STRESS DISORDER): ICD-10-CM

## 2025-07-07 DIAGNOSIS — F33.2 SEVERE EPISODE OF RECURRENT MAJOR DEPRESSIVE DISORDER, WITHOUT PSYCHOTIC FEATURES (H): ICD-10-CM

## 2025-07-07 DIAGNOSIS — F41.1 GENERALIZED ANXIETY DISORDER: Primary | ICD-10-CM

## 2025-07-07 PROCEDURE — 90834 PSYTX W PT 45 MINUTES: CPT | Mod: 95 | Performed by: COUNSELOR

## 2025-07-07 ASSESSMENT — ANXIETY QUESTIONNAIRES
8. IF YOU CHECKED OFF ANY PROBLEMS, HOW DIFFICULT HAVE THESE MADE IT FOR YOU TO DO YOUR WORK, TAKE CARE OF THINGS AT HOME, OR GET ALONG WITH OTHER PEOPLE?: EXTREMELY DIFFICULT
7. FEELING AFRAID AS IF SOMETHING AWFUL MIGHT HAPPEN: MORE THAN HALF THE DAYS
7. FEELING AFRAID AS IF SOMETHING AWFUL MIGHT HAPPEN: MORE THAN HALF THE DAYS
GAD7 TOTAL SCORE: 18
4. TROUBLE RELAXING: NEARLY EVERY DAY
GAD7 TOTAL SCORE: 18
3. WORRYING TOO MUCH ABOUT DIFFERENT THINGS: NEARLY EVERY DAY
6. BECOMING EASILY ANNOYED OR IRRITABLE: MORE THAN HALF THE DAYS
4. TROUBLE RELAXING: NEARLY EVERY DAY
6. BECOMING EASILY ANNOYED OR IRRITABLE: MORE THAN HALF THE DAYS
GAD7 TOTAL SCORE: 18
1. FEELING NERVOUS, ANXIOUS, OR ON EDGE: NEARLY EVERY DAY
7. FEELING AFRAID AS IF SOMETHING AWFUL MIGHT HAPPEN: MORE THAN HALF THE DAYS
8. IF YOU CHECKED OFF ANY PROBLEMS, HOW DIFFICULT HAVE THESE MADE IT FOR YOU TO DO YOUR WORK, TAKE CARE OF THINGS AT HOME, OR GET ALONG WITH OTHER PEOPLE?: EXTREMELY DIFFICULT
5. BEING SO RESTLESS THAT IT IS HARD TO SIT STILL: MORE THAN HALF THE DAYS
3. WORRYING TOO MUCH ABOUT DIFFERENT THINGS: NEARLY EVERY DAY
2. NOT BEING ABLE TO STOP OR CONTROL WORRYING: NEARLY EVERY DAY
1. FEELING NERVOUS, ANXIOUS, OR ON EDGE: NEARLY EVERY DAY
IF YOU CHECKED OFF ANY PROBLEMS ON THIS QUESTIONNAIRE, HOW DIFFICULT HAVE THESE PROBLEMS MADE IT FOR YOU TO DO YOUR WORK, TAKE CARE OF THINGS AT HOME, OR GET ALONG WITH OTHER PEOPLE: EXTREMELY DIFFICULT
IF YOU CHECKED OFF ANY PROBLEMS ON THIS QUESTIONNAIRE, HOW DIFFICULT HAVE THESE PROBLEMS MADE IT FOR YOU TO DO YOUR WORK, TAKE CARE OF THINGS AT HOME, OR GET ALONG WITH OTHER PEOPLE: EXTREMELY DIFFICULT
GAD7 TOTAL SCORE: 18
5. BEING SO RESTLESS THAT IT IS HARD TO SIT STILL: MORE THAN HALF THE DAYS
GAD7 TOTAL SCORE: 18
2. NOT BEING ABLE TO STOP OR CONTROL WORRYING: NEARLY EVERY DAY

## 2025-07-07 NOTE — PROGRESS NOTES
Answers submitted by the patient for this visit:  Patient Health Questionnaire (Submitted on 7/7/2025)  If you checked off any problems, how difficult have these problems made it for you to do your work, take care of things at home, or get along with other people?: Extremely difficult  PHQ9 TOTAL SCORE: 23  Patient Health Questionnaire (G7) (Submitted on 7/7/2025)  BO 7 TOTAL SCORE: 18          [facility]                                     Progress Note    Patient Name: Radha Beckwith  Date: 7/7/25         Service Type: Individual      Session Start Time: 2:30pm Session End Time: 3:20pm     Session Length:  50    Session #: 85    Attendees: Client    Service Modality:  video      Provider verified identity through the following two step process.  Patient provided:  Patient is known previously to provider    Telemedicine Visit: The patient's condition can be safely assessed and treated via synchronous audio and visual telemedicine encounter.      Reason for Telemedicine Visit: Patient convenience (e.g. access to timely appointments / distance to available provider)    Originating Site (Patient Location): Patient's home    Distant Site (Provider Location): Provider Remote Setting- Home Office    Consent:  The patient/guardian has verbally consented to: the potential risks and benefits of telemedicine (video visit) versus in person care; bill my insurance or make self-payment for services provided; and responsibility for payment of non-covered services.     Patient would like the video invitation sent by:  My Chart    Mode of Communication:  telephone- client did not have access to wi/fi to be able to do video session    Distant Location (Provider):  Off-site home office    As the provider I attest to compliance with applicable laws and regulations related to telemedicine.    There has been demonstrated improvement in functioning while patient has been engaged in psychotherapy/psychological  service- if withdrawn the patient would deteriorate and/or relapse.      DATA  Interactive Complexity: No  Crisis: No        Progress Since Last Session (Related to Symptoms / Goals / Homework):   Symptoms: No change .    Homework: Completed in session      Episode of Care Goals: Satisfactory progress - MAINTENANCE (Working to maintain change, with risk of relapse); Intervened by continuing to positively reinforce healthy behavior choice      Current / Ongoing Stressors and Concerns:   Client not feeling well today and really tired.     Treatment Objective(s) Addressed in This Session:   Increase interest, engagement, and pleasure in doing things  Feel less tired and more energy during the day        Intervention:   Mostly checked in with the client today as she was feeling super tired and unwell. Stated she is getting very dizzy a lot and nauseous. Other than this, client is stable currently.      Assessments completed prior to visit:  The following assessments were completed by patient for this visit:  PHQ9:       3/17/2025    11:36 AM 4/21/2025     1:54 PM 5/5/2025    11:49 AM 5/12/2025     2:03 PM 6/16/2025    11:27 AM 6/23/2025     2:05 PM 7/7/2025    10:44 AM   PHQ-9 SCORE   PHQ-9 Total Score MyChart 23 (Severe depression) 24 (Severe depression) 24 (Severe depression) 22 (Severe depression) 22 (Severe depression) 23 (Severe depression) 23 (Severe depression)   PHQ-9 Total Score 23  24  24  22  22  23  23        Patient-reported     GAD7:       2/24/2025    12:03 PM 3/17/2025    11:37 AM 4/21/2025     1:55 PM 5/5/2025    11:50 AM 6/9/2025     2:26 PM 6/23/2025     2:05 PM 7/7/2025    10:46 AM   BO-7 SCORE   Total Score 20 (severe anxiety) 20 (severe anxiety) 20 (severe anxiety) 19 (severe anxiety) 21 (severe anxiety) 19 (severe anxiety) 18 (severe anxiety)   Total Score 20  20  20  19  21  19  18        Patient-reported     PROMIS 10-Global Health (all questions and answers displayed):       12/16/2024      1:07 PM 1/6/2025     3:31 PM 2/24/2025    12:00 PM 5/27/2025     1:59 PM 6/9/2025     2:28 PM 6/23/2025     2:08 PM 7/7/2025    10:48 AM   PROMIS 10   In general, would you say your health is: Poor Poor Poor Fair Fair Poor Fair   In general, would you say your quality of life is: Poor Poor Poor Fair Poor Poor Fair   In general, how would you rate your physical health? Poor Fair Poor Poor Fair Poor Fair   In general, how would you rate your mental health, including your mood and your ability to think? Poor Poor Poor Poor Poor Poor Poor   In general, how would you rate your satisfaction with your social activities and relationships? Fair Fair Poor Poor Poor Poor Poor   In general, please rate how well you carry out your usual social activities and roles Poor Poor Poor Poor Poor Poor Fair   To what extent are you able to carry out your everyday physical activities such as walking, climbing stairs, carrying groceries, or moving a chair? A little A little A little A little A little A little A little   In the past 7 days, how often have you been bothered by emotional problems such as feeling anxious, depressed, or irritable? Always Always Always Often Always Always Always   In the past 7 days, how would you rate your fatigue on average? Very severe Very severe Severe Severe Severe Very severe Very severe   In the past 7 days, how would you rate your pain on average, where 0 means no pain, and 10 means worst imaginable pain? 7 8 7 8 7 8 8   In general, would you say your health is: 1 1 1 2 2 1 2   In general, would you say your quality of life is: 1 1 1 2 1 1 2   In general, how would you rate your physical health? 1 2 1 1 2 1 2   In general, how would you rate your mental health, including your mood and your ability to think? 1 1 1 1 1 1 1   In general, how would you rate your satisfaction with your social activities and relationships? 2 2 1 1 1 1 1   In general, please rate how well you carry out your usual social  activities and roles. (This includes activities at home, at work and in your community, and responsibilities as a parent, child, spouse, employee, friend, etc.) 1 1 1 1 1 1 2   To what extent are you able to carry out your everyday physical activities such as walking, climbing stairs, carrying groceries, or moving a chair? 2 2 2 2 2 2 2   In the past 7 days, how often have you been bothered by emotional problems such as feeling anxious, depressed, or irritable? 5 5 5 4 5 5 5   In the past 7 days, how would you rate your fatigue on average? 5 5 4 4 4 5 5   In the past 7 days, how would you rate your pain on average, where 0 means no pain, and 10 means worst imaginable pain? 7 8 7 8 7 8 8   Global Mental Health Score 5  5  4  6  4  4  5    Global Physical Health Score 6  7  7  7  8  6  7    PROMIS TOTAL - SUBSCORES 11  12  11  13  12  10  12        Patient-reported     [place] Suicide Severity Rating Scale (Lifetime/Recent)      9/5/2024     5:33 PM 9/13/2024    10:52 AM 10/4/2024     4:54 PM 11/22/2024    11:34 AM 12/25/2024    11:30 PM 1/28/2025     1:23 PM 6/20/2025     8:31 PM   [place] Suicide Severity Rating (Lifetime/Recent)   Q1 Wished to be Dead (Past Month) 0-->no 0-->no 0-->no 0-->no 0-->no 0-->no 0-->no   Q2 Suicidal Thoughts (Past Month) 0-->no 0-->no 0-->no 0-->no 0-->no 0-->no 0-->no   Q6 Suicide Behavior (Lifetime) 1-->yes 0-->no 0-->no 0-->no 0-->no 0-->no 0-->no   If yes to Q6, within past 3 months? 0-->no         Level of Risk per Screen moderate risk  no risks indicated  no risks indicated  no risks indicated  no risks indicated  no risks indicated  no risks indicated       Data saved with a previous flowsheet row definition         ASSESSMENT: Current Emotional / Mental Status (status of significant symptoms):   Risk status (Self / Other harm or suicidal ideation)   Patient denies current fears or concerns for personal safety.   Patient denies current or recent suicidal ideation or  behaviors.   Patient denies current or recent homicidal ideation or behaviors.   Patient denies current or recent self injurious behavior or ideation.   Patient denies other safety concerns.   Patient reports there has been no change in risk factors since their last session.     Patient reports there has been no change in protective factors since their last session.     Recommended that patient call 911 or go to the local ED should there be a change in any of these risk factors.     Appearance:   Appropriate    Eye Contact:   Good    Psychomotor Behavior: Normal    Attitude:   Cooperative    Orientation:   All   Speech    Rate / Production: Normal/ Responsive Normal     Volume:  Normal    Mood:    Normal   Affect:    Appropriate    Thought Content:  Clear    Thought Form:  Coherent    Insight:    Good      Medication Review:   No changes to current psychiatric medication(s)     Medication Compliance:   Yes     Changes in Health Issues:   None reported     Chemical Use Review:   Substance Use: Chemical use reviewed, no active concerns identified      Tobacco Use: No change in amount of tobacco use since last session.  Patient declined discussion at this time    Diagnosis:  1. Generalized anxiety disorder    2. PTSD (post-traumatic stress disorder)    3. Severe episode of recurrent major depressive disorder, without psychotic features (H)            Collateral Reports Completed:   Not Applicable    PLAN: (Patient Tasks / Therapist Tasks / Other)  Continue to utilize self care and stress reduction skills daily.               Ricarda Bhatia [facility]                                                         ______________________________________________________________________    Individual Treatment Plan    Patient's Name: Radha Beckwith  YOB: 1975    Date of Creation: 6/28/23  Date Treatment Plan Last Reviewed/Revised: 5/5/25    DSM5 Diagnoses: 296.32 (F33.1) Major Depressive Disorder, Recurrent  Episode, Moderate _  Psychosocial / Contextual Factors: living with boyfriend, memory issues  PROMIS (reviewed every 90 days):     Referral / Collaboration:  Referral to another professional/service is not indicated at this time..    Anticipated number of session for this episode of care: 9-12 sessions  Anticipation frequency of session: as needed  Anticipated Duration of each session: 38-52 minutes  Treatment plan will be reviewed in 90 days or when goals have been changed.       MeasurableTreatment Goal(s) related to diagnosis / functional impairment(s)  Goal 1: Patient will increase communication skills with family members.    Objective #A (Patient Action)    Patient will learn & utilize at least 2 assertive communication skills weekly.  Status: Continued - Date(s): 5/5/25    Intervention(s)  Therapist will teach emotional regulation skills. distress tolerance skills, interpersonal effectiveness skills, emotion regluation skills, mindfulness skills, radical acceptance. Therapist will teach client how to ID body cues for anxiety, anxiety reduction techniques, how to ID triggers for depression and anxiety- decrease reactivity/eliminate, lifestyle changes to reduce depression and anxiety, communication skills, explore cognitive beliefs and help client to develop healthy cognitive patterns and beliefs.    Objective #B  Patient will use thought-stopping strategy daily to reduce intrusive thoughts.  Status: Continued - Date(s): 5/5/25    Intervention(s)  Therapist will teach emotional regulation skills. distress tolerance skills, interpersonal effectiveness skills, emotion regluation skills, mindfulness skills, radical acceptance. Therapist will teach client how to ID body cues for anxiety, anxiety reduction techniques, how to ID triggers for depression and anxiety- decrease reactivity/eliminate, lifestyle changes to reduce depression and anxiety, communication skills, explore cognitive beliefs and help client to  develop healthy cognitive patterns and beliefs.      Goal 2: Patient will decrease depression symptoms.      Objective #A (Patient Action)    Status: Continued - Date(s): 5/5/25    Patient will Increase interest, engagement, and pleasure in doing things  Decrease frequency and intensity of feeling down, depressed, hopeless  Improve quantity and quality of night time sleep / decrease daytime naps  Feel less tired and more energy during the day   Improve diet, appetite, mindful eating, and / or meal planning  Identify negative self-talk and behaviors: challenge core beliefs, myths, and actions  Improve concentration, focus, and mindfulness in daily activities   Feel less fidgety, restless or slow in daily activities / interpersonal interactions.    Intervention(s)  Therapist will teach emotional regulation skills. distress tolerance skills, interpersonal effectiveness skills, emotion regluation skills, mindfulness skills, radical acceptance. Therapist will teach client how to ID body cues for anxiety, anxiety reduction techniques, how to ID triggers for depression and anxiety- decrease reactivity/eliminate, lifestyle changes to reduce depression and anxiety, communication skills, explore cognitive beliefs and help client to develop healthy cognitive patterns and beliefs.    Objective #B  Patient will identify three distraction and diversion activities and use those activities to decrease level of anxiety  .    Status: Continued - Date(s):  5/5/25    Intervention(s)  Therapist will teach emotional regulation skills. distress tolerance skills, interpersonal effectiveness skills, emotion regluation skills, mindfulness skills, radical acceptance. Therapist will teach client how to ID body cues for anxiety, anxiety reduction techniques, how to ID triggers for depression and anxiety- decrease reactivity/eliminate, lifestyle changes to reduce depression and anxiety, communication skills, explore cognitive beliefs and help  client to develop healthy cognitive patterns and beliefs.      Patient has reviewed and agreed to the above plan.      ERIK Keller

## 2025-07-12 SDOH — HEALTH STABILITY: PHYSICAL HEALTH: ON AVERAGE, HOW MANY DAYS PER WEEK DO YOU ENGAGE IN MODERATE TO STRENUOUS EXERCISE (LIKE A BRISK WALK)?: 0 DAYS

## 2025-07-12 SDOH — HEALTH STABILITY: PHYSICAL HEALTH: ON AVERAGE, HOW MANY MINUTES DO YOU ENGAGE IN EXERCISE AT THIS LEVEL?: 0 MIN

## 2025-07-12 ASSESSMENT — SOCIAL DETERMINANTS OF HEALTH (SDOH): HOW OFTEN DO YOU GET TOGETHER WITH FRIENDS OR RELATIVES?: PATIENT DECLINED

## 2025-07-14 ENCOUNTER — OFFICE VISIT (OUTPATIENT)
Dept: FAMILY MEDICINE | Facility: CLINIC | Age: 50
End: 2025-07-14
Payer: COMMERCIAL

## 2025-07-14 ENCOUNTER — VIRTUAL VISIT (OUTPATIENT)
Dept: PSYCHOLOGY | Facility: CLINIC | Age: 50
End: 2025-07-14
Payer: COMMERCIAL

## 2025-07-14 VITALS
DIASTOLIC BLOOD PRESSURE: 74 MMHG | HEIGHT: 65 IN | HEART RATE: 88 BPM | OXYGEN SATURATION: 97 % | RESPIRATION RATE: 16 BRPM | BODY MASS INDEX: 27.99 KG/M2 | SYSTOLIC BLOOD PRESSURE: 122 MMHG | WEIGHT: 168 LBS | TEMPERATURE: 97.3 F

## 2025-07-14 DIAGNOSIS — F51.5 NIGHTMARES ASSOCIATED WITH CHRONIC POST-TRAUMATIC STRESS DISORDER: ICD-10-CM

## 2025-07-14 DIAGNOSIS — E78.1 HYPERTRIGLYCERIDEMIA: ICD-10-CM

## 2025-07-14 DIAGNOSIS — F17.200 NICOTINE DEPENDENCE, UNCOMPLICATED, UNSPECIFIED NICOTINE PRODUCT TYPE: ICD-10-CM

## 2025-07-14 DIAGNOSIS — Z13.6 ENCOUNTER FOR LIPID SCREENING FOR CARDIOVASCULAR DISEASE: ICD-10-CM

## 2025-07-14 DIAGNOSIS — Z86.79 HISTORY OF PAROXYSMAL SUPRAVENTRICULAR TACHYCARDIA: ICD-10-CM

## 2025-07-14 DIAGNOSIS — E78.5 HYPERLIPIDEMIA LDL GOAL <100: ICD-10-CM

## 2025-07-14 DIAGNOSIS — Z71.6 ENCOUNTER FOR TOBACCO USE CESSATION COUNSELING: ICD-10-CM

## 2025-07-14 DIAGNOSIS — R42 DIZZINESS: ICD-10-CM

## 2025-07-14 DIAGNOSIS — K21.9 GASTROESOPHAGEAL REFLUX DISEASE WITHOUT ESOPHAGITIS: ICD-10-CM

## 2025-07-14 DIAGNOSIS — F33.2 SEVERE EPISODE OF RECURRENT MAJOR DEPRESSIVE DISORDER, WITHOUT PSYCHOTIC FEATURES (H): ICD-10-CM

## 2025-07-14 DIAGNOSIS — Z28.21 COVID-19 VACCINATION DECLINED: ICD-10-CM

## 2025-07-14 DIAGNOSIS — Z13.21 ENCOUNTER FOR VITAMIN DEFICIENCY SCREENING: ICD-10-CM

## 2025-07-14 DIAGNOSIS — Z13.220 ENCOUNTER FOR LIPID SCREENING FOR CARDIOVASCULAR DISEASE: ICD-10-CM

## 2025-07-14 DIAGNOSIS — M26.609 TMJ (TEMPOROMANDIBULAR JOINT SYNDROME): ICD-10-CM

## 2025-07-14 DIAGNOSIS — F43.12 NIGHTMARES ASSOCIATED WITH CHRONIC POST-TRAUMATIC STRESS DISORDER: ICD-10-CM

## 2025-07-14 DIAGNOSIS — I95.0 IDIOPATHIC HYPOTENSION: ICD-10-CM

## 2025-07-14 DIAGNOSIS — F43.10 PTSD (POST-TRAUMATIC STRESS DISORDER): ICD-10-CM

## 2025-07-14 DIAGNOSIS — F41.1 GENERALIZED ANXIETY DISORDER: Primary | ICD-10-CM

## 2025-07-14 DIAGNOSIS — Z92.29 HEPATITIS A AND HEPATITIS B VACCINE ADMINISTERED: ICD-10-CM

## 2025-07-14 DIAGNOSIS — Z28.21 PNEUMOCOCCAL VACCINATION DECLINED: ICD-10-CM

## 2025-07-14 DIAGNOSIS — M99.05 SOMATIC DYSFUNCTION OF PELVIC REGION: ICD-10-CM

## 2025-07-14 DIAGNOSIS — H90.3 SENSORINEURAL HEARING LOSS, BILATERAL: ICD-10-CM

## 2025-07-14 DIAGNOSIS — Z00.00 ROUTINE GENERAL MEDICAL EXAMINATION AT A HEALTH CARE FACILITY: Primary | ICD-10-CM

## 2025-07-14 DIAGNOSIS — Z13.9 ENCOUNTER FOR SCREENING INVOLVING SOCIAL DETERMINANTS OF HEALTH (SDOH): ICD-10-CM

## 2025-07-14 DIAGNOSIS — R10.2 SUPRAPUBIC PAIN: ICD-10-CM

## 2025-07-14 DIAGNOSIS — F40.10 SOCIAL ANXIETY DISORDER: ICD-10-CM

## 2025-07-14 DIAGNOSIS — G25.81 RESTLESS LEGS: ICD-10-CM

## 2025-07-14 DIAGNOSIS — F11.21 OPIOID DEPENDENCE IN REMISSION (H): ICD-10-CM

## 2025-07-14 DIAGNOSIS — Z13.29 SCREENING FOR THYROID DISORDER: ICD-10-CM

## 2025-07-14 DIAGNOSIS — Z72.0 TOBACCO ABUSE: ICD-10-CM

## 2025-07-14 DIAGNOSIS — R82.90 CLOUDY URINE: ICD-10-CM

## 2025-07-14 DIAGNOSIS — K59.03 DRUG INDUCED CONSTIPATION: ICD-10-CM

## 2025-07-14 DIAGNOSIS — R30.0 DYSURIA: ICD-10-CM

## 2025-07-14 DIAGNOSIS — Z13.1 SCREENING FOR DIABETES MELLITUS: ICD-10-CM

## 2025-07-14 DIAGNOSIS — R73.03 PREDIABETES: ICD-10-CM

## 2025-07-14 LAB
ALBUMIN UR-MCNC: NEGATIVE MG/DL
AMPHETAMINES UR QL SCN: ABNORMAL
ANION GAP SERPL CALCULATED.3IONS-SCNC: 12 MMOL/L (ref 7–15)
APPEARANCE UR: CLEAR
BARBITURATES UR QL SCN: ABNORMAL
BENZODIAZ UR QL SCN: ABNORMAL
BILIRUB UR QL STRIP: NEGATIVE
BUN SERPL-MCNC: 9.6 MG/DL (ref 6–20)
BZE UR QL SCN: ABNORMAL
CALCIUM SERPL-MCNC: 9.3 MG/DL (ref 8.8–10.4)
CANNABINOIDS UR QL SCN: ABNORMAL
CHLORIDE SERPL-SCNC: 98 MMOL/L (ref 98–107)
CHOLEST SERPL-MCNC: 243 MG/DL
CLUE CELLS: ABNORMAL
COLOR UR AUTO: NORMAL
CREAT SERPL-MCNC: 0.84 MG/DL (ref 0.51–0.95)
EGFRCR SERPLBLD CKD-EPI 2021: 84 ML/MIN/1.73M2
EST. AVERAGE GLUCOSE BLD GHB EST-MCNC: 123 MG/DL
FASTING STATUS PATIENT QL REPORTED: NO
FASTING STATUS PATIENT QL REPORTED: NO
FENTANYL UR QL: ABNORMAL
GLUCOSE SERPL-MCNC: 116 MG/DL (ref 70–99)
GLUCOSE UR STRIP-MCNC: NEGATIVE MG/DL
HBA1C MFR BLD: 5.9 %
HCO3 SERPL-SCNC: 27 MMOL/L (ref 22–29)
HDLC SERPL-MCNC: 35 MG/DL
HGB UR QL STRIP: NEGATIVE
KETONES UR STRIP-MCNC: NEGATIVE MG/DL
LDLC SERPL CALC-MCNC: ABNORMAL MG/DL
LEUKOCYTE ESTERASE UR QL STRIP: NEGATIVE
NITRATE UR QL: NEGATIVE
NONHDLC SERPL-MCNC: 208 MG/DL
OPIATES UR QL SCN: ABNORMAL
PCP QUAL URINE (ROCHE): ABNORMAL
PH UR STRIP: 5 [PH] (ref 5–7)
POTASSIUM SERPL-SCNC: 4 MMOL/L (ref 3.4–5.3)
SODIUM SERPL-SCNC: 137 MMOL/L (ref 135–145)
SP GR UR STRIP: 1 (ref 1–1.03)
TRICHOMONAS, WET PREP: ABNORMAL
TRIGL SERPL-MCNC: 590 MG/DL
TSH SERPL DL<=0.005 MIU/L-ACNC: 2.8 UIU/ML (ref 0.3–4.2)
UROBILINOGEN UR STRIP-MCNC: NORMAL MG/DL
VIT D+METAB SERPL-MCNC: 40 NG/ML (ref 20–50)
WBC'S/HIGH POWER FIELD, WET PREP: ABNORMAL
YEAST, WET PREP: ABNORMAL

## 2025-07-14 PROCEDURE — 80307 DRUG TEST PRSMV CHEM ANLYZR: CPT | Performed by: STUDENT IN AN ORGANIZED HEALTH CARE EDUCATION/TRAINING PROGRAM

## 2025-07-14 PROCEDURE — 36415 COLL VENOUS BLD VENIPUNCTURE: CPT | Performed by: STUDENT IN AN ORGANIZED HEALTH CARE EDUCATION/TRAINING PROGRAM

## 2025-07-14 PROCEDURE — 90834 PSYTX W PT 45 MINUTES: CPT | Mod: 95 | Performed by: COUNSELOR

## 2025-07-14 RX ORDER — NICOTINE 10 MG/ML
1 SPRAY, METERED NASAL
Qty: 10 ML | Refills: 2 | Status: SHIPPED | OUTPATIENT
Start: 2025-07-14

## 2025-07-14 RX ORDER — GUANFACINE 3 MG/1
TABLET, EXTENDED RELEASE ORAL
COMMUNITY
Start: 2025-06-11

## 2025-07-14 ASSESSMENT — PAIN SCALES - GENERAL: PAINLEVEL_OUTOF10: SEVERE PAIN (7)

## 2025-07-14 NOTE — PROGRESS NOTES
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 times daily. 15 g 0    cyclobenzaprine (FLEXERIL) 5 MG tablet Take 1-2 tablets (5-10 mg) by mouth 2 times daily as needed for muscle spasms.      diclofenac (VOLTAREN) 1 % topical gel Apply 4 g topically 4 times daily. 350 g 2    DULoxetine (CYMBALTA) 30 MG capsule Take 90 mg daily in morning (60 mg and 30 mg capsules together). 30 capsule 1    DULoxetine (CYMBALTA) 60 MG capsule Take 90 mg daily in morning (60 mg and 30 mg capsules together). 30 capsule 1    famotidine (PEPCID) 40 MG tablet Take 1 tablet (40 mg) by mouth 2 times daily. 60 tablet 0    gabapentin 8 % in PLO cream Apply 4 clicks (1 g) topically every 8 hours. 90 g 1    guanFACINE HCl (INTUNIV) 3 MG TB24 24 hr tablet       hydrocortisone, Perianal, (HYDROCORTISONE) 2.5 % cream Place rectally 2 times daily as needed for hemorrhoids. 30 g 3    hyoscyamine (LEVSIN) 0.125 MG tablet Take 1 tablet (125 mcg) by mouth every 6  hours as needed for cramping. 60 tablet 5    lactulose (CONSTULOSE) 10 GM/15ML solution Take 15 mLs (10 g) by mouth 3 times daily as needed for constipation ((as needed for severe constipation, no BM for more than 3 days and feeling constipated).). 300 mL 3    lidocaine-prilocaine (EMLA) 2.5-2.5 % external cream Apply topically 3 times daily as needed for moderate pain. 30 g 1    linaclotide (LINZESS) 290 MCG capsule Take 1 capsule (290 mcg) by mouth every morning (before breakfast). 30 capsule 11    magic mouthwash suspension (diphenhydrAMINE, lidocaine, aluminum-magnesium & simethicone) Swish and spit 10 mLs in mouth every 6 hours as needed (mouth pain). 200 mL 0    medical cannabis (Patient's own supply) See Admin Instructions. Has [place] Medical Cannabis Card- Purchases through [facility].   (The purpose of this order is to document that the patient reports taking medical cannabis.  This is not a prescription, and is not used to certify that the patient has a qualifying medical condition.)      nicotine (NICOTROL NS) 10 MG/ML SOLN inhalation solution Spray 1 spray in nostril every hour as needed for nicotine withdrawal symptoms (Max of 40 per day). How to take it: Blow nose gently, then take 1 spray in each side of nose every hour. Two sprays contain 1 mg of nicotine. Do not take more than 10 sprays per hour or 40 sprays per 24 hours 10 mL 2    ondansetron (ZOFRAN ODT) 4 MG ODT tab DISSOLVE ONE TABLET BY MOUTH EVERY 6 HOURS AS NEEDED FOR NAUSEA 20 tablet 3    pantoprazole (PROTONIX) 40 MG EC tablet Take 1 tablet (40 mg) by mouth 2 times daily (with meals). 180 tablet 2    rOPINIRole (REQUIP) 1 MG tablet Take 1 tablet (1 mg) by mouth at bedtime. 90 tablet 1    simvastatin (ZOCOR) 10 MG tablet TAKE ONE TABLET BY MOUTH EVERY NIGHT AT BEDTIME 90 tablet 1    temazepam (RESTORIL) 15 MG capsule        Allergies   Allergen Reactions    Compazine Anxiety and Palpitations     Severe anxiety attack    Droperidol  "Anxiety and Palpitations     Severe anxiety attack    Nubain [Nalbuphine Hcl] Anxiety and Palpitations     Severe anxiety attack    Ergotamine-Caffeine      12-            GI problems-    Seasonal Allergies     Sumatriptan      vomits after giving herself a shot    Prochlorperazine Anxiety and Palpitations     Uncontrolled movement, severe anxiety attack     Recent Labs   Lab Test 07/14/25  1531 06/20/25  2108 12/25/24  2341 12/05/24  1636 11/22/24  1237 05/13/24  1538 01/05/24  0800 09/06/23  2310 09/05/23  1249 02/27/23  1701 06/29/22  0958 07/06/21  1153 03/02/21  1403 11/24/19  2320   0000   A1C 5.9*  --   --   --   --   --   --   --   --   --   --   --   --  5.0  --    LDL  --   --   --   --   --   --  113*  --  117*  --  140*  --  133*  --   --    HDL 35*  --   --   --   --   --  39*  --  31*  --  30*  --   --   --    < >   TRIG 590*  --   --   --   --   --  274*  --  456*  --  1,220*  --   --   --    < >   ALT  --   --  20 21 18   < >  --    < >  --   --   --  34 38 30  --    CR 0.84 0.69 1.03* 0.72 0.72   < > 0.77   < > 0.76   < > 0.68 0.75 0.63 0.76  --    GFRESTIMATED 84 >90 66 >90 >90   < > >90   < > >90   < > >90 >90 >90 >90  --    GFRESTBLACK  --   --   --   --   --   --   --   --   --   --   --  >90 >90 >90  --    POTASSIUM 4.0 4.3 4.3 4.2 4.5   < > 3.9   < > 3.9   < > 3.4 3.9 4.3 3.5  --    TSH 2.80  --   --   --   --   --  3.75   < >  --   --  3.51  --  2.42  --   --     < > = values in this interval not displayed.             Review of Systems  Negative unless otherwise specified per HPI.       Objective    Exam  /74   Pulse 88   Temp 97.3  F (36.3  C) (Temporal)   Resp 16   Ht 1.653 m (5' 5.08\")   Wt 76.2 kg (168 lb)   LMP  (LMP Unknown)   SpO2 97%   BMI 27.89 kg/m     Estimated body mass index is 27.89 kg/m  as calculated from the following:    Height as of this encounter: 1.653 m (5' 5.08\").    Weight as of this encounter: 76.2 kg (168 lb).    Physical Exam  GENERAL: alert " and no distress  EYES: Eyes grossly normal to inspection, PERRL and conjunctivae and sclerae normal  HENT: ear canals and TM's normal, nose and mouth without ulcers or lesions  NECK: no adenopathy, no asymmetry, masses, or scars  RESP: lungs clear to auscultation - no rales, rhonchi or wheezes  CV: regular rate and rhythm, normal S1 S2, no S3 or S4, no murmur, click or rub, no peripheral edema  ABDOMEN: soft, mild ttp suprabupic, ND, dull to percussion, no hepatosplenomegaly, no masses and bowel sounds normal  MS: no gross musculoskeletal defects noted, no edema, nttp or percussion throughout back, no CVS tenderness.   SKIN: no suspicious lesions or rashes  NEURO: Normal strength and tone, mentation intact and speech normal  PSYCH: mentation appears normal, affect normal/bright  Gait and balance assessed per Gait Speed Test.  Result as above.        Signed Electronically by: Cathy Duarte DO

## 2025-07-14 NOTE — PROGRESS NOTES
Answers submitted by the patient for this visit:  Patient Health Questionnaire (Submitted on 7/14/2025)  If you checked off any problems, how difficult have these problems made it for you to do your work, take care of things at home, or get along with other people?: Extremely difficult  PHQ9 TOTAL SCORE: 23  Patient Health Questionnaire (G7) (Submitted on 7/7/2025)  BO 7 TOTAL SCORE: 18          [facility]                                     Progress Note    Patient Name: Radha Beckwith  Date: 7/14/25         Service Type: Individual      Session Start Time: 2:30pm Session End Time: 3:15pm     Session Length:  45    Session #: 86    Attendees: Client    Service Modality:  video      Provider verified identity through the following two step process.  Patient provided:  Patient is known previously to provider    Telemedicine Visit: The patient's condition can be safely assessed and treated via synchronous audio and visual telemedicine encounter.      Reason for Telemedicine Visit: Patient convenience (e.g. access to timely appointments / distance to available provider)    Originating Site (Patient Location): Patient's home    Distant Site (Provider Location): Provider Remote Setting- Home Office    Consent:  The patient/guardian has verbally consented to: the potential risks and benefits of telemedicine (video visit) versus in person care; bill my insurance or make self-payment for services provided; and responsibility for payment of non-covered services.     Patient would like the video invitation sent by:  My Chart    Mode of Communication:  telephone- client did not have access to wi/fi to be able to do video session    Distant Location (Provider):  Off-site home office    As the provider I attest to compliance with applicable laws and regulations related to telemedicine.    There has been demonstrated improvement in functioning while patient has been engaged in psychotherapy/psychological  service- if withdrawn the patient would deteriorate and/or relapse.      DATA  Interactive Complexity: No  Crisis: No        Progress Since Last Session (Related to Symptoms / Goals / Homework):   Symptoms: No change .    Homework: Completed in session      Episode of Care Goals: Satisfactory progress - MAINTENANCE (Working to maintain change, with risk of relapse); Intervened by continuing to positively reinforce healthy behavior choice      Current / Ongoing Stressors and Concerns:   Client had primary appointment today. Stated she's feeling so tired lately and even falling alseep while eating.      Treatment Objective(s) Addressed in This Session:   Increase interest, engagement, and pleasure in doing things  Feel less tired and more energy during the day        Intervention:   Encouraged client to make follow up appointment with the doctor to talk about the fatigue she's feeling and the dietary questions she has. Processed internal conflicts around self judgments.     Assessments completed prior to visit:  The following assessments were completed by patient for this visit:  PHQ9:       4/21/2025     1:54 PM 5/5/2025    11:49 AM 5/12/2025     2:03 PM 6/16/2025    11:27 AM 6/23/2025     2:05 PM 7/7/2025    10:44 AM 7/14/2025    12:49 PM   PHQ-9 SCORE   PHQ-9 Total Score MyChart 24 (Severe depression) 24 (Severe depression) 22 (Severe depression) 22 (Severe depression) 23 (Severe depression) 23 (Severe depression) 23 (Severe depression)   PHQ-9 Total Score 24  24  22  22  23  23  23        Patient-reported     GAD7:       2/24/2025    12:03 PM 3/17/2025    11:37 AM 4/21/2025     1:55 PM 5/5/2025    11:50 AM 6/9/2025     2:26 PM 6/23/2025     2:05 PM 7/7/2025    10:46 AM   BO-7 SCORE   Total Score 20 (severe anxiety) 20 (severe anxiety) 20 (severe anxiety) 19 (severe anxiety) 21 (severe anxiety) 19 (severe anxiety) 18 (severe anxiety)   Total Score 20  20  20  19  21  19  18        Patient-reported     PROMIS  10-Global Health (all questions and answers displayed):       12/16/2024     1:07 PM 1/6/2025     3:31 PM 2/24/2025    12:00 PM 5/27/2025     1:59 PM 6/9/2025     2:28 PM 6/23/2025     2:08 PM 7/7/2025    10:48 AM   PROMIS 10   In general, would you say your health is: Poor Poor Poor Fair Fair Poor Fair   In general, would you say your quality of life is: Poor Poor Poor Fair Poor Poor Fair   In general, how would you rate your physical health? Poor Fair Poor Poor Fair Poor Fair   In general, how would you rate your mental health, including your mood and your ability to think? Poor Poor Poor Poor Poor Poor Poor   In general, how would you rate your satisfaction with your social activities and relationships? Fair Fair Poor Poor Poor Poor Poor   In general, please rate how well you carry out your usual social activities and roles Poor Poor Poor Poor Poor Poor Fair   To what extent are you able to carry out your everyday physical activities such as walking, climbing stairs, carrying groceries, or moving a chair? A little A little A little A little A little A little A little   In the past 7 days, how often have you been bothered by emotional problems such as feeling anxious, depressed, or irritable? Always Always Always Often Always Always Always   In the past 7 days, how would you rate your fatigue on average? Very severe Very severe Severe Severe Severe Very severe Very severe   In the past 7 days, how would you rate your pain on average, where 0 means no pain, and 10 means worst imaginable pain? 7 8 7 8 7 8 8   In general, would you say your health is: 1 1 1 2 2 1 2   In general, would you say your quality of life is: 1 1 1 2 1 1 2   In general, how would you rate your physical health? 1 2 1 1 2 1 2   In general, how would you rate your mental health, including your mood and your ability to think? 1 1 1 1 1 1 1   In general, how would you rate your satisfaction with your social activities and relationships? 2 2 1 1 1  1 1   In general, please rate how well you carry out your usual social activities and roles. (This includes activities at home, at work and in your community, and responsibilities as a parent, child, spouse, employee, friend, etc.) 1 1 1 1 1 1 2   To what extent are you able to carry out your everyday physical activities such as walking, climbing stairs, carrying groceries, or moving a chair? 2 2 2 2 2 2 2   In the past 7 days, how often have you been bothered by emotional problems such as feeling anxious, depressed, or irritable? 5 5 5 4 5 5 5   In the past 7 days, how would you rate your fatigue on average? 5 5 4 4 4 5 5   In the past 7 days, how would you rate your pain on average, where 0 means no pain, and 10 means worst imaginable pain? 7 8 7 8 7 8 8   Global Mental Health Score 5  5  4  6  4  4  5    Global Physical Health Score 6  7  7  7  8  6  7    PROMIS TOTAL - SUBSCORES 11  12  11  13  12  10  12        Patient-reported     Whitney Suicide Severity Rating Scale (Lifetime/Recent)      9/5/2024     5:33 PM 9/13/2024    10:52 AM 10/4/2024     4:54 PM 11/22/2024    11:34 AM 12/25/2024    11:30 PM 1/28/2025     1:23 PM 6/20/2025     8:31 PM   Whitney Suicide Severity Rating (Lifetime/Recent)   Q1 Wished to be Dead (Past Month) 0-->no 0-->no 0-->no 0-->no 0-->no 0-->no 0-->no   Q2 Suicidal Thoughts (Past Month) 0-->no 0-->no 0-->no 0-->no 0-->no 0-->no 0-->no   Q6 Suicide Behavior (Lifetime) 1-->yes 0-->no 0-->no 0-->no 0-->no 0-->no 0-->no   If yes to Q6, within past 3 months? 0-->no         Level of Risk per Screen moderate risk  no risks indicated  no risks indicated  no risks indicated  no risks indicated  no risks indicated  no risks indicated       Data saved with a previous flowsheet row definition         ASSESSMENT: Current Emotional / Mental Status (status of significant symptoms):   Risk status (Self / Other harm or suicidal ideation)   Patient denies current fears or concerns for personal  safety.   Patient denies current or recent suicidal ideation or behaviors.   Patient denies current or recent homicidal ideation or behaviors.   Patient denies current or recent self injurious behavior or ideation.   Patient denies other safety concerns.   Patient reports there has been no change in risk factors since their last session.     Patient reports there has been no change in protective factors since their last session.     Recommended that patient call 911 or go to the local ED should there be a change in any of these risk factors.     Appearance:   Appropriate    Eye Contact:   Good    Psychomotor Behavior: Normal    Attitude:   Cooperative    Orientation:   All   Speech    Rate / Production: Normal/ Responsive Normal     Volume:  Normal    Mood:    Normal   Affect:    Appropriate    Thought Content:  Clear    Thought Form:  Coherent    Insight:    Good      Medication Review:   No changes to current psychiatric medication(s)     Medication Compliance:   Yes     Changes in Health Issues:   None reported     Chemical Use Review:   Substance Use: Chemical use reviewed, no active concerns identified      Tobacco Use: No change in amount of tobacco use since last session.  Patient declined discussion at this time    Diagnosis:  1. Generalized anxiety disorder    2. PTSD (post-traumatic stress disorder)    3. Severe episode of recurrent major depressive disorder, without psychotic features (H)            Collateral Reports Completed:   Not Applicable    PLAN: (Patient Tasks / Therapist Tasks / Other)  Continue to utilize self care and stress reduction skills daily.               Ricarda Bhatia, [facility]                                                         ______________________________________________________________________    Individual Treatment Plan    Patient's Name: Radha Beckwith  YOB: 1975    Date of Creation: 6/28/23  Date Treatment Plan Last Reviewed/Revised: 5/5/25    DSM5  Diagnoses: 296.32 (F33.1) Major Depressive Disorder, Recurrent Episode, Moderate _  Psychosocial / Contextual Factors: living with boyfriend, memory issues  PROMIS (reviewed every 90 days):     Referral / Collaboration:  Referral to another professional/service is not indicated at this time..    Anticipated number of session for this episode of care: 9-12 sessions  Anticipation frequency of session: as needed  Anticipated Duration of each session: 38-52 minutes  Treatment plan will be reviewed in 90 days or when goals have been changed.       MeasurableTreatment Goal(s) related to diagnosis / functional impairment(s)  Goal 1: Patient will increase communication skills with family members.    Objective #A (Patient Action)    Patient will learn & utilize at least 2 assertive communication skills weekly.  Status: Continued - Date(s): 5/5/25    Intervention(s)  Therapist will teach emotional regulation skills. distress tolerance skills, interpersonal effectiveness skills, emotion regluation skills, mindfulness skills, radical acceptance. Therapist will teach client how to ID body cues for anxiety, anxiety reduction techniques, how to ID triggers for depression and anxiety- decrease reactivity/eliminate, lifestyle changes to reduce depression and anxiety, communication skills, explore cognitive beliefs and help client to develop healthy cognitive patterns and beliefs.    Objective #B  Patient will use thought-stopping strategy daily to reduce intrusive thoughts.  Status: Continued - Date(s): 5/5/25    Intervention(s)  Therapist will teach emotional regulation skills. distress tolerance skills, interpersonal effectiveness skills, emotion regluation skills, mindfulness skills, radical acceptance. Therapist will teach client how to ID body cues for anxiety, anxiety reduction techniques, how to ID triggers for depression and anxiety- decrease reactivity/eliminate, lifestyle changes to reduce depression and anxiety,  communication skills, explore cognitive beliefs and help client to develop healthy cognitive patterns and beliefs.      Goal 2: Patient will decrease depression symptoms.      Objective #A (Patient Action)    Status: Continued - Date(s): 5/5/25    Patient will Increase interest, engagement, and pleasure in doing things  Decrease frequency and intensity of feeling down, depressed, hopeless  Improve quantity and quality of night time sleep / decrease daytime naps  Feel less tired and more energy during the day   Improve diet, appetite, mindful eating, and / or meal planning  Identify negative self-talk and behaviors: challenge core beliefs, myths, and actions  Improve concentration, focus, and mindfulness in daily activities   Feel less fidgety, restless or slow in daily activities / interpersonal interactions.    Intervention(s)  Therapist will teach emotional regulation skills. distress tolerance skills, interpersonal effectiveness skills, emotion regluation skills, mindfulness skills, radical acceptance. Therapist will teach client how to ID body cues for anxiety, anxiety reduction techniques, how to ID triggers for depression and anxiety- decrease reactivity/eliminate, lifestyle changes to reduce depression and anxiety, communication skills, explore cognitive beliefs and help client to develop healthy cognitive patterns and beliefs.    Objective #B  Patient will identify three distraction and diversion activities and use those activities to decrease level of anxiety  .    Status: Continued - Date(s):  5/5/25    Intervention(s)  Therapist will teach emotional regulation skills. distress tolerance skills, interpersonal effectiveness skills, emotion regluation skills, mindfulness skills, radical acceptance. Therapist will teach client how to ID body cues for anxiety, anxiety reduction techniques, how to ID triggers for depression and anxiety- decrease reactivity/eliminate, lifestyle changes to reduce depression and  anxiety, communication skills, explore cognitive beliefs and help client to develop healthy cognitive patterns and beliefs.      Patient has reviewed and agreed to the above plan.      ERIK Keller

## 2025-07-14 NOTE — NURSING NOTE
Prior to immunization administration, verified patients identity using patient s name and date of birth. Please see Immunization Activity for additional information.     Screening Questionnaire for Adult Immunization    Are you sick today?   No   Do you have allergies to medications, food, a vaccine component or latex?   No   Have you ever had a serious reaction after receiving a vaccination?   No   Do you have a long-term health problem with heart, lung, kidney, or metabolic disease (e.g., diabetes), asthma, a blood disorder, no spleen, complement component deficiency, a cochlear implant, or a spinal fluid leak?  Are you on long-term aspirin therapy?   No   Do you have cancer, leukemia, HIV/AIDS, or any other immune system problem?   No   Do you have a parent, brother, or sister with an immune system problem?   No   In the past 3 months, have you taken medications that affect  your immune system, such as prednisone, other steroids, or anticancer drugs; drugs for the treatment of rheumatoid arthritis, Crohn s disease, or psoriasis; or have you had radiation treatments?   Yes   Have you had a seizure, or a brain or other nervous system problem?   No   During the past year, have you received a transfusion of blood or blood    products, or been given immune (gamma) globulin or antiviral drug?   No   For women: Are you pregnant or is there a chance you could become       pregnant during the next month?   No   Have you received any vaccinations in the past 4 weeks?   No     Immunization questionnaire was positive for at least one answer.  Notified Dr. Duarte.      Patient instructed to remain in clinic for 15 minutes afterwards, and to report any adverse reactions.     Screening performed by Jenny Carroll MA on 7/14/2025 at 3:55 PM.

## 2025-07-14 NOTE — PATIENT INSTRUCTIONS
Patient Education   Preventive Care Advice   This is general advice given by our system to help you stay healthy. However, your care team may have specific advice just for you. Please talk to your care team about your preventive care needs.  Nutrition  Eat 5 or more servings of fruits and vegetables each day.  Try wheat bread, brown rice and whole grain pasta (instead of white bread, rice, and pasta).  Get enough calcium and vitamin D. Check the label on foods and aim for 100% of the RDA (recommended daily allowance).  Lifestyle  Exercise at least 150 minutes each week  (30 minutes a day, 5 days a week).  Do muscle strengthening activities 2 days a week. These help control your weight and prevent disease.  No smoking.  Wear sunscreen to prevent skin cancer.  Have a dental exam and cleaning every 6 months.  Yearly exams  See your health care team every year to talk about:  Any changes in your health.  Any medicines your care team has prescribed.  Preventive care, family planning, and ways to prevent chronic diseases.  Shots (vaccines)   HPV shots (up to age 26), if you've never had them before.  Hepatitis B shots (up to age 59), if you've never had them before.  COVID-19 shot: Get this shot when it's due.  Flu shot: Get a flu shot every year.  Tetanus shot: Get a tetanus shot every 10 years.  Pneumococcal, hepatitis A, and RSV shots: Ask your care team if you need these based on your risk.  Shingles shot (for age 50 and up)  General health tests  Diabetes screening:  Starting at age 35, Get screened for diabetes at least every 3 years.  If you are younger than age 35, ask your care team if you should be screened for diabetes.  Cholesterol test: At age 39, start having a cholesterol test every 5 years, or more often if advised.  Bone density scan (DEXA): At age 50, ask your care team if you should have this scan for osteoporosis (brittle bones).  Hepatitis C: Get tested at least once in your life.  STIs (sexually  transmitted infections)  Before age 24: Ask your care team if you should be screened for STIs.  After age 24: Get screened for STIs if you're at risk. You are at risk for STIs (including HIV) if:  You are sexually active with more than one person.  You don't use condoms every time.  You or a partner was diagnosed with a sexually transmitted infection.  If you are at risk for HIV, ask about PrEP medicine to prevent HIV.  Get tested for HIV at least once in your life, whether you are at risk for HIV or not.  Cancer screening tests  Cervical cancer screening: If you have a cervix, begin getting regular cervical cancer screening tests starting at age 21.  Breast cancer scan (mammogram): If you've ever had breasts, begin having regular mammograms starting at age 40. This is a scan to check for breast cancer.  Colon cancer screening: It is important to start screening for colon cancer at age 45.  Have a colonoscopy test every 10 years (or more often if you're at risk) Or, ask your provider about stool tests like a FIT test every year or Cologuard test every 3 years.  To learn more about your testing options, visit:   .  For help making a decision, visit:   https://bit.ly/mh79572.  Prostate cancer screening test: If you have a prostate, ask your care team if a prostate cancer screening test (PSA) at age 55 is right for you.  Lung cancer screening: If you are a current or former smoker ages 50 to 80, ask your care team if ongoing lung cancer screenings are right for you.  For informational purposes only. Not to replace the advice of your health care provider. Copyright   2023 [employer]. All rights reserved. Clinically reviewed by the [facility]. [employer] 058800 - REV 01/24.  Your Health Risk Assessment indicates you feel you are not in good health    A healthy lifestyle helps keep the body fit and the mind alert. It helps protect you from disease, helps you fight disease, and  helps prevent chronic disease (disease that doesn't go away) from getting worse. This is important as you get older and begin to notice twinges in muscles and joints and a decline in the strength and stamina you once took for granted. A healthy lifestyle includes good healthcare, good nutrition, weight control, recreation, and regular exercise. Avoid harmful substances and do what you can to keep safe. Another part of a healthy lifestyle is stay mentally active and socially involved.    Good healthcare   Have a wellness visit every year.   If you have new symptoms, let us know right away. Don't wait until the next checkup.   Take medicines exactly as prescribed and keep your medicines in a safe place. Tell us if your medicine causes problems.   Healthy diet and weight control   Eat 3 or 4 small, nutritious, low-fat, high-fiber meals a day. Include a variety of fruits, vegetables, and whole-grain foods.   Make sure you get enough calcium in your diet. Calcium, vitamin D, and exercise help prevent osteoporosis (bone thinning).   If you live alone, try eating with others when you can. That way you get a good meal and have company while you eat it.   Try to keep a healthy weight. If you eat more calories than your body uses for energy, it will be stored as fat and you will gain weight.     Recreation   Recreation is not limited to sports and team events. It includes any activity that provides relaxation, interest, enjoyment, and exercise. Recreation provides an outlet for physical, mental, and social energy. It can give a sense of worth and achievement. It can help you stay healthy.    Mental Exercise and Social Involvement  Mental and emotional health is as important as physical health. Keep in touch with friends and family. Stay as active as possible. Continue to learn and challenge yourself.   Things you can do to stay mentally active are:  Learn something new, like a foreign language or musical instrument.   Play  "SCRABBLE or do crossword puzzles. If you cannot find people to play these games with you at home, you can play them with others on your computer through the Internet.   Join a games club--anything from card games to chess or checkers or lawn bowling.   Start a new hobby.   Go back to school.   Volunteer.   Read.   Keep up with world events.  Learning About Being Physically Active  What is physical activity?     Being physically active means doing any kind of activity that gets your body moving.  The types of physical activity that can help you get fit and stay healthy include:  Aerobic or \"cardio\" activities. These make your heart beat faster and make you breathe harder, such as brisk walking, riding a bike, or running. They strengthen your heart and lungs and build up your endurance.  Strength training activities. These make your muscles work against, or \"resist,\" something. Examples include lifting weights or doing push-ups. These activities help tone and strengthen your muscles and bones.  Stretches. These let you move your joints and muscles through their full range of motion. Stretching helps you be more flexible.  Reaching a balance between these three types of physical activity is important because each one contributes to your overall fitness.  What are the benefits of being active?  Being active is one of the best things you can do for your health. It helps you to:  Feel stronger and have more energy to do all the things you like to do.  Focus better at school or work.  Feel, think, and sleep better.  Reach and stay at a healthy weight.  Lose fat and build lean muscle.  Lower your risk for serious health problems, including diabetes, heart attack, high blood pressure, and some cancers.  Keep your heart, lungs, bones, muscles, and joints strong and healthy.  How can you make being active part of your life?  Start slowly. Make it your long-term goal to get at least 30 minutes of exercise on most days of the " "week. Walking is a good choice. You also may want to do other activities, such as running, swimming, cycling, or playing tennis or team sports.  Pick activities that you like--ones that make your heart beat faster, your muscles stronger, and your muscles and joints more flexible. If you find more than one thing you like doing, do them all. You don't have to do the same thing every day.  Get your heart pumping every day. Any activity that makes your heart beat faster and keeps it at that rate for a while counts.  Here are some great ways to get your heart beating faster:  Go for a brisk walk, run, or hike.  Go for a swim or bike ride.  Take an online exercise class or dance.  Play a game of touch football, basketball, or soccer.  Play tennis, pickleball, or racquetball.  Climb stairs.  Even some household chores can be aerobic. Just do them at a faster pace. Raking or mowing the lawn, sweeping the garage, and vacuuming and cleaning your home all can help get your heart rate up.  Strengthen your muscles during the week. You don't have to lift heavy weights or grow big, bulky muscles to get stronger. Doing a few simple activities that make your muscles work against, or \"resist,\" something can help you get stronger. Aim for at least twice a week.  For example, you can:  Do push-ups or sit-ups, which use your own body weight as resistance.  Lift weights or dumbbells or use stretch bands at home or in a gym or community center.  Stretch your muscles often. Stretching will help you as you become more active. It can help you stay flexible and loosen tight muscles. It can also help improve your balance and posture and can be a great way to relax.  Be sure to stretch the muscles you'll be using when you work out. It's best to warm your muscles slightly before you stretch them. Walk or do some other light aerobic activity for a few minutes. Then start stretching.  When you stretch your muscles:  Do it slowly. Stretching is not " "about going fast or making sudden movements.  Don't push or bounce during a stretch.  Hold each stretch for at least 15 to 30 seconds, if you can. You should feel a stretch in the muscle, but not pain.  Breathe out as you do the stretch. Then breathe in as you hold the stretch. Don't hold your breath.  If you're worried about how more activity might affect your health, have a checkup before you start. Follow any special advice your doctor gives you for getting a smart start.  Where can you learn more?  Go to https://www.infibond.net/patiented  Enter W332 in the search box to learn more about \"Learning About Being Physically Active.\"  Current as of: July 31, 2024  Content Version: 14.5 2024-2025 [employer].   Care instructions adapted under license by your healthcare professional. If you have questions about a medical condition or this instruction, always ask your healthcare professional. [employer] disclaims any warranty or liability for your use of this information.    Eating Healthy Foods: Care Instructions  With every meal, you can make healthy food choices. Try to eat a variety of fruits, vegetables, whole grains, lean proteins, and low-fat dairy products. This can help you get the right balance of nutrients, including vitamins and minerals. Small changes add up over time. You can start by adding one healthy food to your meals each day.    Try to make half your plate fruits and vegetables, one-fourth whole grains, and one-fourth lean proteins. Try including dairy with your meals.   Eat more fruits and vegetables. Try to have them with most meals and snacks.   Foods for healthy eating        Fruits   These can be fresh, frozen, canned, or dried.  Try to choose whole fruit rather than fruit juice.  Eat a variety of colors.        Vegetables   These can be fresh, frozen, canned, or dried.  Beans, peas, and lentils count too.        Whole grains   Choose whole-grain breads, cereals, and " "noodles.  Try brown rice.        Lean proteins   These can include lean meat, poultry, fish, and eggs.  You can also have tofu, beans, peas, lentils, nuts, and seeds.        Dairy   Try milk, yogurt, and cheese.  Choose low-fat or fat-free when you can.  If you need to, use lactose-free milk or fortified plant-based milk products, such as soy milk.        Water   Drink water when you're thirsty.  Limit sugar-sweetened drinks, including soda, fruit drinks, and sports drinks.  Where can you learn more?  Go to https://www.Taltopia.net/patiented  Enter T756 in the search box to learn more about \"Eating Healthy Foods: Care Instructions.\"  Current as of: October 7, 2024  Content Version: 14.5    8291-3587 [employer].   Care instructions adapted under license by your healthcare professional. If you have questions about a medical condition or this instruction, always ask your healthcare professional. [employer] disclaims any warranty or liability for your use of this information.    Preventing Falls: Care Instructions  Injuries and health problems such as trouble walking or poor eyesight can increase your risk of falling. So can some medicines. But there are things you can do to help prevent falls. You can exercise to get stronger. You can also arrange your home to make it safer.    Talk to your doctor about the medicines you take. Ask if any of them increase the risk of falls and whether they can be changed or stopped.   Try to exercise regularly. It can help improve your strength and balance. This can help lower your risk of falling.         Practice fall safety and prevention.   Wear low-heeled shoes that fit well and give your feet good support. Talk to your doctor if you have foot problems that make this hard.  Carry a cellphone or wear a medical alert device that you can use to call for help.  Use stepladders instead of chairs to reach high objects. Don't climb if you're at risk for falls. " "Ask for help, if needed.  Wear the correct eyeglasses, if you need them.        Make your home safer.   Remove rugs, cords, clutter, and furniture from walkways.  Keep your house well lit. Use night-lights in hallways and bathrooms.  Install and use sturdy handrails on stairways.  Wear nonskid footwear, even inside. Don't walk barefoot or in socks without shoes.        Be safe outside.   Use handrails, curb cuts, and ramps whenever possible.  Keep your hands free by using a shoulder bag or backpack.  Try to walk in well-lit areas. Watch out for uneven ground, changes in pavement, and debris.  Be careful in the winter. Walk on the grass or gravel when sidewalks are slippery. Use de-icer on steps and walkways. Add non-slip devices to shoes.    Put grab bars and nonskid mats in your shower or tub and near the toilet. Try to use a shower chair or bath bench when bathing.   Get into a tub or shower by putting in your weaker leg first. Get out with your strong side first. Have a phone or medical alert device in the bathroom with you.   Where can you learn more?  Go to https://www.[employer].net/patiented  Enter G117 in the search box to learn more about \"Preventing Falls: Care Instructions.\"  Current as of: July 31, 2024  Content Version: 14.5 2024-2025 [employer].   Care instructions adapted under license by your healthcare professional. If you have questions about a medical condition or this instruction, always ask your healthcare professional. [employer] disclaims any warranty or liability for your use of this information.    Learning About Stress  What is stress?     Stress is your body's response to a hard situation. Your body can have a physical, emotional, or mental response. Stress is a fact of life for most people, and it affects everyone differently. What causes stress for you may not be stressful for someone else.  A lot of things can cause stress. You may feel stress when you go on a " job interview, take a test, or run a race. This kind of short-term stress is normal and even useful. It can help you if you need to work hard or react quickly. For example, stress can help you finish an important job on time.  Long-term stress is caused by ongoing stressful situations or events. Examples of long-term stress include long-term health problems, ongoing problems at work, or conflicts in your family. Long-term stress can harm your health.  How does stress affect your health?  When you are stressed, your body responds as though you are in danger. It makes hormones that speed up your heart, make you breathe faster, and give you a burst of energy. This is called the fight-or-flight stress response. If the stress is over quickly, your body goes back to normal and no harm is done.  But if stress happens too often or lasts too long, it can have bad effects. Long-term stress can make you more likely to get sick, and it can make symptoms of some diseases worse. If you tense up when you are stressed, you may develop neck, shoulder, or low back pain. Stress is linked to high blood pressure and heart disease.  Stress also harms your emotional health. It can make you dodson, tense, or depressed. Your relationships may suffer, and you may not do well at work or school.  What can you do to manage stress?  You can try these things to help manage stress:   Do something active. Exercise or activity can help reduce stress. Walking is a great way to get started. Even everyday activities such as housecleaning or yard work can help.  Try yoga or hermilo chi. These techniques combine exercise and meditation. You may need some training at first to learn them.  Do something you enjoy. For example, listen to music or go to a movie. Practice your hobby or do volunteer work.  Meditate. This can help you relax, because you are not worrying about what happened before or what may happen in the future.  Do guided imagery. Imagine yourself  "in any setting that helps you feel calm. You can use online videos, books, or a teacher to guide you.  Do breathing exercises. For example:  From a standing position, bend forward from the waist with your knees slightly bent. Let your arms dangle close to the floor.  Breathe in slowly and deeply as you return to a standing position. Roll up slowly and lift your head last.  Hold your breath for just a few seconds in the standing position.  Breathe out slowly and bend forward from the waist.  Let your feelings out. Talk, laugh, cry, and express anger when you need to. Talking with supportive friends or family, a counselor, or a loretta leader about your feelings is a healthy way to relieve stress. Avoid discussing your feelings with people who make you feel worse.  Write. It may help to write about things that are bothering you. This helps you find out how much stress you feel and what is causing it. When you know this, you can find better ways to cope.  What can you do to prevent stress?  You might try some of these things to help prevent stress:  Manage your time. This helps you find time to do the things you want and need to do.  Get enough sleep. Your body recovers from the stresses of the day while you are sleeping.  Get support. Your family, friends, and community can make a difference in how you experience stress.  Limit your news feed. Avoid or limit time on social media or news that may make you feel stressed.  Do something active. Exercise or activity can help reduce stress. Walking is a great way to get started.  Where can you learn more?  Go to https://www.RentShare.net/patiented  Enter N032 in the search box to learn more about \"Learning About Stress.\"  Current as of: October 24, 2024  Content Version: 14.5    5317-2392 [employer].   Care instructions adapted under license by your healthcare professional. If you have questions about a medical condition or this instruction, always ask your " healthcare professional. [employer] disclaims any warranty or liability for your use of this information.           Nicotine Metered Dose Nasal Spray  Nicotrol    Uses  For quitting smoking  .  Instructions  Keep the medicine at room temperature. Avoid heat and direct light.    This medicine is for use in the nose. Avoid getting the medicine into the eyes or mouth.    Rinse the eyes with water if the medicine is sprayed into the eyes.    Ask your doctor, nurse or pharmacist to show you how to use this medicine correctly.    The first time you open a new spray, you need to get it ready to use. Point the bottle away from you and spray into the air. Repeat until you see a fine mist. It is now ready to use. This process is called priming the spray.    Read and carefully follow package instructions for preparing, using and cleaning the pump.    Wash your hands before and after handling this medicine.    Blow and wipe your nose with a clean tissue before using this product.    Tilt the head back slightly before spraying the medicine.    Do not sniff, swallow, or inhale through the nose while using the spray.    Avoid getting medicine in the eyes or mouth or on the skin. If medicine comes in contact with one of these areas, rinse thoroughly with clean water.    Do not blow nose for at least 2 to 3 minutes after using this medicine.    If you forget to take a dose on time, take it as soon as you remember. If it is almost time for the next dose, do not take the missed dose. Return to your normal dosing schedule.     Do not take 2 doses of this medicine at one time.    Please tell your doctor and pharmacist about all the medicines you take. Include both prescription and over-the-counter medicines. Also tell them about any vitamins, herbal medicines, or anything else you take for your health.    Store in original bottle, tightly closed and upright.    Do not share this nasal spray with other people. Doing so may  spread infections.    Do not use more than 40 doses in 24 hours.    Cautions  Do not use the medication any more than instructed.    Avoid smoking while on this medicine. Smoking may increase your risk for stroke, heart attack, blood clots, high blood pressure, and other diseases of the heart and blood vessels.    Tell the doctor or pharmacist if you are pregnant, planning to be pregnant, or breastfeeding.    Please tell all your doctors and dentists that you are on this medicine before they provide care.    Ask your pharmacist if this medicine can interact with any of your other medicines. Be sure to tell them about all the medicines you take.    Do not start or stop any other medicines without first speaking to your doctor or pharmacist.    Side Effects  The following is a list of some common side effects from this medicine. Please speak with your doctor about what you should do if you experience these or other side effects.  coughing  nosebleeds  hoarseness or throat irritation  irritation inside the nose  runny nose  sneezing  changes in taste or unpleasant taste  Call your doctor or get medical help right away if you notice any of these more serious side effects:  chest pain  confusion  dizziness  swelling of the legs, feet, and hands  severe or persistent headache  fast or irregular heart beats  mood changes  feeling of numbness or tingling in your hands and feet  slurred speech  weakness on one side of the body  A few people may have an allergic reaction to this medicine. Symptoms can include difficulty breathing, skin rash, itching, swelling, or severe dizziness. If you notice any of these symptoms, seek medical help quickly.    Extra  Please speak with your doctor, nurse, or pharmacist if you have any questions about this medicine.    [email or web address]  IMPORTANT NOTE: This document tells you briefly how to take your medicine, but it does not tell you all there is to know  about it. Your doctor or pharmacist may give you other documents about your medicine. Please talk to them if you have any questions. Always follow their advice. There is a more complete description of this medicine available in English. Scan this code on your smartphone or tablet or use the web address below. You can also ask your pharmacist for a printout. If you have any questions, please ask your pharmacist.   2021 [employer].         9130-1044 [employer]. All rights reserved. This information is not intended as a substitute for professional medical care. Always follow your healthcare professional's instructions.            Quitting Tobacco: Care Instructions  Quitting tobacco is much harder than simply changing a habit. Nicotine cravings make it hard to quit, but you can do it. Your doctor will help you set up the plan that best meets your needs.    You will miss the nicotine at first. You may feel short-tempered and grumpy. You may have trouble sleeping or thinking clearly. The urge to use tobacco may continue for a time.   Combining tools can raise your chances of success. You can use medicine along with counseling. And you can join a quit-tobacco program, such as the American Lung Association's Freedom from Smoking program.     Get support.  Reach out to family and friends, and find a counselor to help you quit. Join a support group, such as Nicotine Anonymous. Go to www.smokefree.gov to sign up for text messaging support.     Talk to your doctor or pharmacist about medicines that can help you quit.  Medicines can help with cravings and withdrawal symptoms. There are several over-the-counter choices, such as nicotine patches, gum, and lozenges.     After you quit, do not use tobacco again, not even once.  Get rid of all tobacco products and anything that reminds you of tobacco, such as ashtrays.     Avoid things that make you reach for tobacco.  Change your daily routine. Take a  "different route to work, or eat a meal in a different place.     Try to cut down on stress.  Find ways to calm yourself, such as taking a hot bath or doing deep breathing exercises.     Eat a healthy diet, and get regular exercise.  Having healthy habits may help you quit using tobacco.     Don't give up on quitting if you use tobacco again.  Most people quit and restart a few times before they quit for good.   Follow-up care is a key part of your treatment and safety. Be sure to make and go to all appointments, and call your doctor if you are having problems. It's also a good idea to know your test results and keep a list of the medicines you take.  Where can you learn more?  Go to https://www.[employer].net/patiented  Enter Y522 in the search box to learn more about \"Quitting Tobacco: Care Instructions.\"  Current as of: August 20, 2024  Content Version: 14.5 2024-2025 [employer].   Care instructions adapted under license by your healthcare professional. If you have questions about a medical condition or this instruction, always ask your healthcare professional. [employer] disclaims any warranty or liability for your use of this information.    "

## 2025-07-15 ENCOUNTER — TELEPHONE (OUTPATIENT)
Dept: FAMILY MEDICINE | Facility: CLINIC | Age: 50
End: 2025-07-15
Payer: COMMERCIAL

## 2025-07-15 ENCOUNTER — TELEPHONE (OUTPATIENT)
Dept: OTOLARYNGOLOGY | Facility: CLINIC | Age: 50
End: 2025-07-15
Payer: COMMERCIAL

## 2025-07-15 ENCOUNTER — PATIENT OUTREACH (OUTPATIENT)
Dept: CARE COORDINATION | Facility: CLINIC | Age: 50
End: 2025-07-15
Payer: COMMERCIAL

## 2025-07-15 LAB — LDLC SERPL DIRECT ASSAY-MCNC: 122 MG/DL

## 2025-07-15 NOTE — PROGRESS NOTES
Clinic Care Coordination Contact  Community Health Worker Initial Outreach    CHW Initial Information Gathering:  Referral Source: PCP  Preferred Hospital: [facility]  [phone]  Preferred Urgent Care: [facility], [phone]  Community Resources: Financial/Insurance  No PCP office visit in Past Year: No       Patient accepts CC: Yes. Patient scheduled for assessment with the SW on 7/16/25 at 12:00 pm. Patient noted desire to discuss supports with the disability process.     Angel CHW  [phone]  [facility]  [facility]

## 2025-07-15 NOTE — TELEPHONE ENCOUNTER
"LOV note on 2/24/25:  \"A/P - Radha Beckwith is a 49 year old female Patient presents with:  Follow Up: LPRD, ears     Patient with pulsatile tinnitus that may need further evaluation unless further workup at the spine clinic which she already has visited in the past and will visit again in the workup with TMJ clinic will shed light on pulsatile tinnitus as being related to those conditions.  Otherwise  MRI and MR angiography of the brain to make sure there are no vascular abnormalities.  Also we discussed in the meantime treatment of musculoskeletal inflammation and TMJ with local heat mostly and massage.  She cannot take ibuprofen.     Radha should follow up in 6 months. \"    Relayed this information to the patient. Patient's questions were related to TMJ work-up. Relaying LOV notes to patient cleared up patient's questions.     Elizabeth Anton RN on 7/15/2025 at 3:08 PM    "

## 2025-07-15 NOTE — TELEPHONE ENCOUNTER
[facility] Call Center    Phone Message    May a detailed message be left on voicemail: yes     Reason for Call: Other: Pt called in regards to MRI and MRA. She has some questions/requests about those scans. Also if she should be doing those before her TMJ appt on 7/30 or to wait     Action Taken: Other: PH ENT    Travel Screening: Not Applicable     Date of Service:

## 2025-07-15 NOTE — TELEPHONE ENCOUNTER
Reason for Call:  Appointment Request    Patient requesting this type of appt:  Hospital/ED Follow-Up     Requested provider: Cathy Hernandez    Reason patient unable to be scheduled: Not within requested timeframe    When does patient want to be seen/preferred time: patient wants IN PERSON asap    Comments: stated it was for a follow up from her annual wellness that not everything was discussed at yesterdays appt 7/14/25    Could we send this information to you in [employer] or would you prefer to receive a phone call?:   Patient would prefer a phone call   Okay to leave a detailed message?: Yes at Cell number on file:    Telephone Information:   Mobile [phone]       Call taken on 7/15/2025 at 2:39 PM by Frida Weaver

## 2025-07-16 ENCOUNTER — PATIENT OUTREACH (OUTPATIENT)
Dept: FAMILY MEDICINE | Facility: CLINIC | Age: 50
End: 2025-07-16
Payer: COMMERCIAL

## 2025-07-16 ASSESSMENT — ACTIVITIES OF DAILY LIVING (ADL): DEPENDENT_IADLS:: CLEANING;COOKING;LAUNDRY;SHOPPING;MEAL PREPARATION;MEDICATION MANAGEMENT;MONEY MANAGEMENT

## 2025-07-16 NOTE — LETTER
[facility]  Patient Centered Plan of Care  About Me:        Patient Name:  Radha Ramon    YOB: 1975  Age:         49 year old   [place] MRN:    [id number] Telephone Information:  Home Phone [phone]   Mobile [phone]       Address:  [address] [facility] [address] Email address:  [email or web address]      Emergency Contact(s)    Name Relationship Lgl Grd Work Phone Home Phone Mobile Phone   1. BANDAR SALINAS Friend    [phone]   2. ZAHIRA IGLESIAS Friend    [phone]   3. DO RAMON* Son    [phone]           Primary language:  English     needed? No   [place] [facility]:  [phone] op. 1  Other communication barriers:Other (ADHD can cause challenges)    Preferred Method of Communication:  Mail  Current living arrangement: I live in a private home    Mobility Status/ Medical Equipment: Independent        Health Maintenance  Health Maintenance Reviewed: Due/Overdue   Health Maintenance Due   Topic Date Due    CONTROLLED SUBSTANCE AGREEMENT FOR CHRONIC PAIN MANAGEMENT  Never done    PNEUMOCOCCAL VACCINE 50+ YEARS (1 of 2 - PCV) Never done    LUNG CANCER SCREENING  Never done    COVID-19 VACCINE (3 - 2024-25 season) 09/01/2024           My Access Plan  Medical Emergency 911   Primary Clinic Line [facility] [phone]   24 Hour Appointment Line [phone] or  [phone]) (toll-free)   24 Hour Nurse Line [phone] (toll-free)   Preferred Urgent Care Other     Preferred Hospital [facility]  [phone]     Preferred Pharmacy [facility] [address]      Behavioral Health Crisis Line The National Suicide Prevention Lifeline at 1-694.985.7304 or Text/Call 138           My Care Team Members  Patient Care Team         Relationship Specialty Notifications Start End    Gisselle Linn NP PCP - General Nurse Practitioner -  Family  2/14/24     Phone: [phone] Fax: [phone]         [address] [place] [address]    Gisselle Linn, KIANNA Assigned PCP   12/19/21     Phone: [phone] Fax: [phone]         [address] [place] [address]    Gisselle Linn, NP Referring Physician Nurse Practitioner - Family  3/3/22     Phone: [phone] Fax: [phone]         [address] [place] [address]    Jose Haas MD MD Neurology  3/3/22     Phone: [phone] Fax: [phone]         [address] [place] [address]    Madelaine Arredondo APRN CNP Nurse Practitioner   1/13/23     Phone: [phone] Fax: [phone] [address] [place] [address]    Jenny Landrum APRN CNP Nurse Practitioner Pain Medicine  1/20/23     Phone: [phone] Fax: [phone]         [address] [place] [address]    Reema Hernandez MD MD Neurology  6/20/23     Phone: [phone] Fax: [phone]         [address] [place] [address]    Ricarda Bhatia LPCC  Licensed Mental Health  1/11/24     Phone: [phone] Fax: [phone]         [address] [address]    Clare Carmona DO Obstetrician OB/Gyn  3/18/24     Phone: [phone] Fax: [phone]         [address] [place] [address]    Ricarda Bhatia LPCC Assigned Behavioral Health Provider   4/23/24     Phone: [phone] Fax: [phone]         [address] [address]    Michael oDzier MD MD Vascular Surgery  7/26/24     Phone: [phone] Fax: [phone]         [address] [place] [address]    Clare Carmona, DO [facility] OBGYN Provider   8/23/24     Phone: [phone] Fax: [phone] [address] [facility] [address]    Marielle Kaur, NP Nurse Practitioner Neurological Surgery  1/24/25     Phone: [phone] Fax: [phone] [address] [place] [address] [place] [address]    Jacoby,  MD Michael Assigned Heart and Vascular Surgical Provider   3/23/25     Phone: [phone] Fax: [phone] [address] [place] [address]    Jose Adame DO Assigned Musculoskeletal Provider   3/23/25     Phone: [phone] Fax: [phone] [address] [place] [address]    Loulou Mendoza, PhD Psychologist PSYCHOLOGIST CLINICAL  4/7/25     Madelaine Arredondo APRN CNP Assigned Surgical Provider   5/23/25     Phone: [phone] Fax: [phone]         [address] [place] [address]    Wilda Herrmann LSW Lead Care Coordinator Primary Care -  Admissions 7/15/25     Phone: [phone] Fax: [phone]                    My Care Plans  Self Management and Treatment Plan    Care Plan  Care Plan: Mental Health       Problem: Mental Health Symptoms Need Improvement       Long-Range Goal: Improve management of mental health symptoms and establish with mental health/psychosocial supports       Start Date: 7/16/2025 Expected End Date: 7/16/2026    Note:     Barriers: Mental health  - pain  Strengths: can follow directions for 1-2 steps for best outcome  Patient expressed understanding of goal: yes  Action steps to achieve this goal:  1. I will call [facility] for mental health  -  intake - [phone] - option 3  2. I will accept a call from [facility] and work with them on getting intake done  3. I will work with care coordination ongoing for changing/progressing on goal                                Action Plans on File:            Depression          Advance Care Plans/Directives:   Advanced Care Plan/Directives on file: No    Discussed with patient/caregiver(s): No data recorded           My Medical and Care Information  Problem List   Patient Active Problem List   Diagnosis    GERD (gastroesophageal reflux disease)    Restless legs    Hyperlipidemia LDL goal <100    Tobacco abuse    Anxiety attack    Opioid dependence in remission (H)    Psychophysiological insomnia     Chronic bilateral low back pain without sciatica    Supraventricular tachycardia    Chronic low back pain with bilateral sciatica    Chronic neck pain    Generalized anxiety disorder    Somatic dysfunction of pelvic region    Myofascial pain    Cervical radiculopathy    Chronic bilateral thoracic back pain    Lumbar radiculopathy    Pain of both sacroiliac joints    Radicular pain of upper extremity    Cervicalgia    Muscle spasm    Drug induced constipation    Female pelvic pain    HL (hearing loss)    Migraine    Nightmares associated with chronic post-traumatic stress disorder    Ringing in ears    Social anxiety disorder    Mastalgia in female    Panic disorder    PTSD (post-traumatic stress disorder)    Otalgia, bilateral    Sensorineural hearing loss, bilateral    Pulsatile tinnitus of both ears    TMJ (temporomandibular joint syndrome)    Severe episode of recurrent major depressive disorder, without psychotic features (H)      Current Medications:  Please refer to the most recent medication list provided to you by your medical team and reach out to your provider with any questions or to make any corrections.    Care Coordination Start Date: 7/14/2025   Frequency of Care Coordination: monthly, more frequently as needed     Form Last Updated: 07/16/2025

## 2025-07-16 NOTE — TELEPHONE ENCOUNTER
We had an extremely long visit and covered many issues. Unfortunately, as she was here for an annual wellness visit, we could only attend to a subset of her list so focused on the issues she indicated at the time. It was suggested that she schedule a follow-up visit for later with PCP.    I can see her this Friday in my 11:30 slot as a video visit. I would advise her we can cover 2-3 of her highest priority issues and anything additional I suggest we go ahead and schedule her right away in the earliest opening with PCP, which I believe was September. This way she can get in then to cover anything else left.    Cathy Duarte, DO

## 2025-07-16 NOTE — PROGRESS NOTES
Clinic Care Coordination Contact  Clinic Care Coordination Contact  OUTREACH    Referral Information:  Referral Source: PCP    Primary Diagnosis: Psychosocial    Chief Complaint   Patient presents with    Clinic Care Coordination - Initial     [facility]        [place] Utilization: as noted below  Clinic Utilization  Difficulty keeping appointments:: No  Compliance Concerns: No  No-Show Concerns: No  No PCP office visit in Past Year: No  Utilization      No Show Count (past year)  3             ED Visits  7             Hospital Admissions  2                    Current as of: 7/16/2025 12:24 PM                Clinical Concerns:  Current Medical Concerns:  pt noted pain as a main medical factor that limits her functioning.    Current Behavioral Concerns: pt has a long list of  diagnoses (see problem list for details).  She notes that her ADHD causes a lot of challenges with managing her schedule and tasks as she gets distracted or lost in the process.  She has weekly counseling and see's psychiatry.   She struggles with managing her daily routine/needs and medications when there are changes. She does not have a [facility]  or [facility] worker at this time and is interested.   Education Provided to patient: discussed that a [facility] CM and [facility] worker may be a good place to start with supports and goal created.  Phone number for [facility]  intake was given over the phone and sent via [email or web address] ([phone] option 3) Since pt noted she struggles with getting things done when overwhelmed or too many steps (she gets distracted to other tasks/needs and does not finish one before starting another) decision was made to focus on one area/one task at a time.      Pain  Pain (GOAL):: Yes  Type: Acute on Chronic  Location of chronic pain:: Neck,entire back and spine,wrists,hips,ears,  Radiating: Yes  Location pain radiates to: Thumbs-wrists-arms. Lower head-neck-shoulders.Bilateral lower back-hips. Outer sides  of upper hips down both sides legs (sciatica).  Progression: Worsening  Description of pain: Aching, Burning, Electrical-like, Gnawing, Nagging, Numb, Penetrating, Sharp, Shooting, Stabbing, Tender, Throbbing  Chronic pain severity:: 10  Limitation of routine activities due to chronic pain:: Yes  Description: Able to do light housework  Alleviating Factors: Rest, Ice, Heat, TENS Unit, Procedures or Injections  Aggravating Factors: Activity, Inactivity, Positioning, Eating, Emotional Stress  Health Maintenance Reviewed: Due/Overdue   Health Maintenance Due   Topic Date Due    CONTROLLED SUBSTANCE AGREEMENT FOR CHRONIC PAIN MANAGEMENT  Never done    PNEUMOCOCCAL VACCINE 50+ YEARS (1 of 2 - PCV) Never done    LUNG CANCER SCREENING  Never done    COVID-19 VACCINE (3 - 2024-25 season) 09/01/2024       Clinical Pathway: None    Medication Management:  Medication review status: Medications reviewed and no changes reported per patient.        Were not discussed in detail as were just reviewed in clinic on 7/14/25.     Functional Status:  Dependent ADLs:: Independent, Bathing, Dressing, Grooming  Dependent IADLs:: Cleaning, Cooking, Laundry, Shopping, Meal Preparation, Medication Management, Money Management  Bed or wheelchair confined:: No  Mobility Status: Independent  Fallen 2 or more times in the past year?: (!) Yes  Any fall with injury in the past year?: No    Living Situation:  Current living arrangement:: I live in a private home  [facility]    Lifestyle & Psychosocial Needs:    Social Drivers of Health     Food Insecurity: High Risk (7/12/2025)    Food Insecurity     Within the past 12 months, did you worry that your food would run out before you got money to buy more?: No     Within the past 12 months, did the food you bought just not last and you didn t have money to get more?: Yes   Depression: At risk (7/14/2025)    PHQ-2     PHQ-2 Score: 6   Housing Stability: Low Risk   (7/12/2025)    Housing Stability     Do you have housing? : Yes     Are you worried about losing your housing?: Patient declined   Recent Concern: Housing Stability - High Risk (4/27/2025)    Housing Stability     Do you have housing? : Yes     Are you worried about losing your housing?: Yes   Tobacco Use: High Risk (7/14/2025)    Patient History     Smoking Tobacco Use: Every Day     Smokeless Tobacco Use: Never     Passive Exposure: Never   Financial Resource Strain: High Risk (7/12/2025)    Financial Resource Strain     Within the past 12 months, have you or your family members you live with been unable to get utilities (heat, electricity) when it was really needed?: Yes   Alcohol Use: Not on file   Transportation Needs: High Risk (7/12/2025)    Transportation Needs     Within the past 12 months, has lack of transportation kept you from medical appointments, getting your medicines, non-medical meetings or appointments, work, or from getting things that you need?: Yes   Physical Activity: Inactive (7/12/2025)    Exercise Vital Sign     Days of Exercise per Week: 0 days     Minutes of Exercise per Session: 0 min   Interpersonal Safety: Low Risk  (7/14/2025)    Interpersonal Safety     Do you feel physically and emotionally safe where you currently live?: Yes     Within the past 12 months, have you been hit, slapped, kicked or otherwise physically hurt by someone?: No     Within the past 12 months, have you been humiliated or emotionally abused in other ways by your partner or ex-partner?: No   Stress: Stress Concern Present (7/12/2025)    [place] [employer] - Occupational Stress Questionnaire     Feeling of Stress : Very much   Social Connections: Unknown (7/12/2025)    Social Connection and Isolation Panel [NHANES]     Frequency of Communication with Friends and Family: Not on file     Frequency of Social Gatherings with Friends and Family: Patient declined     Attends Latter-day Services: Not  "on file     Active Member of Clubs or Organizations: Not on file     Attends Club or Organization Meetings: Not on file     Marital Status: Not on file   Health Literacy: Not on file     Diet:: Regular  Inadequate nutrition (GOAL):: Yes  Tube Feeding: No  Inadequate activity/exercise (GOAL):: Yes  Significant changes in sleep pattern (GOAL): Yes  Transportation means:: Accessible car     Adventist or spiritual beliefs that impact treatment:: No  Mental health DX:: Yes  Mental health DX how managed:: Medication, Outpatient Counseling, Psychiatrist, Alternative Treatments  Mental health management concern (GOAL):: Yes  Chemical Dependency Status: No Current Concerns  Chemical Dependency Management: Other (see comment)  Informal Support system:: Friends        Pt is applying for disability and has been doing all the steps herself.  She has court hearing coming up and did get some help with wording from a free legal supports.  She would not be opposed to additional legal support with the hearing yet does not want to \"hire\" someone at this point who will get paid for doing very little work.  Discussed the disability hub for options.      Pt noted she is concerned with housing if she does not get supports soon.  At this time decision was made to focus on the MH supports and disability supports. This will hopefully allow for improved follow through and progress on priority areas and then move to next priority area.      Resources and Interventions:  Current Resources:      Community Resources: [facility],  Mental Health, Other (see comment) (legal support for disability)  Supplies Currently Used at Home: Wipes  Equipment Currently Used at Home: cane, straight, shower chair  Employment Status: other (see comments)         Advance Care Plan/Directive  Advanced Care Plans/Directives on file:: No    Referrals Placed: [facility] Resources, Community Resources         Care Plan:  Care Plan: Mental Health       Problem: Mental " Health Symptoms Need Improvement       Long-Range Goal: Improve management of mental health symptoms and establish with mental health/psychosocial supports       Start Date: 7/16/2025 Expected End Date: 7/16/2026    Note:     Barriers: Mental health  - pain  Strengths: can follow directions for 1-2 steps for best outcome  Patient expressed understanding of goal: yes  Action steps to achieve this goal:  1. I will call [facility] for mental health  -  intake - [phone] - option 3  2. I will accept a call from [facility] and work with them on getting intake done  3. I will work with care coordination ongoing for changing/progressing on goal                                Patient/Caregiver understanding: pt to call [facility] for  CM and disability hub.     Outreach Frequency: monthly, more frequently as needed  Future Appointments                In 2 days Cathy Duarte DO [facility]    In 5 days Ricarda Bhatia LPCC [facility]    In 1 week Ricarda Bhatia LPCC [facility]    In 3 weeks Ricarda Bhatia LPCC [facility] [place] [facility]    In 1 month Ricarda Bhatia LPCC [facility]    In 1 month Jenny Landrum APRN CNP [facility]    In 1 month Micha Hough MD [facility]    In 1 month Ricarda Bhatia LPCC [facility]    In 1 month Gisselle Linn NP [facility]    In 1 month Ricarda Bhatia LPCC [facility]    In 2 months Ricarda Bhatia  [facility] [place] [facility]    In 2 months Ricarda Bhatia [facility] [place] [facility]    In 2 months Ricarda Bhatia [facility] [place] [facility]            Plan: will send care plan and introduction letter via [employer].  Will send resources and goal via [employer] message.  Will send a check in [employer] message around 7/28/25 for update and request of when she would like to talk again.     Wilda Herrmann, [facility] - Care Coordination  [facility]   [phone]

## 2025-07-16 NOTE — LETTER
[facility] COORDINATION  [address]   [place] [address]    July 16, 2025    Radha MASSEY Amena  [address]  [place] [address]      Dear Linette,    I am a clinic care coordinator who works with Gisselle Linn NP with the [facility]. I wanted to thank you for spending the time to talk with me.  Below is a description of clinic care coordination and how I can further assist you.       The clinic care coordination team is made up of a registered nurse, , financial resource worker and community health worker who understand the health care system. The goal of clinic care coordination is to help you manage your health and improve access to the health care system. Our team works alongside your provider to assist you in determining your health and social needs. We can help you obtain health care and community resources, providing you with necessary information and education. We can work with you through any barriers and develop a care plan that helps coordinate and strengthen the communication between you and your care team.  Our services are voluntary and are offered without charge to you personally.    Please feel free to contact me with any questions or concerns regarding care coordination and what we can offer.      We are focused on providing you with the highest-quality healthcare experience possible.    Sincerely,     Wilda Herrmann, JACK   [facility] - Care Coordination  [facility]   [phone]

## 2025-07-18 ENCOUNTER — TELEPHONE (OUTPATIENT)
Dept: FAMILY MEDICINE | Facility: CLINIC | Age: 50
End: 2025-07-18

## 2025-07-18 NOTE — TELEPHONE ENCOUNTER
Prior Authorization Retail Medication Request    Medication/Dose: Linzess 290 mcg  Diagnosis and ICD code (if different than what is on RX):    New/renewal/insurance change PA/secondary ins. PA:  Previously Tried and Failed:    Rationale:      Insurance   Primary: EMI ROSADO  Insurance ID: [id number]    Pharmacy Information (if different than what is on RX)  Name:  [facility] [place]  Phone:  [phone]  Fax:[phone]      Thank you,   Gabbie Mcbride, Pharmacy Tech  [facility]         Clinic Information  Preferred routing pool for dept communication:

## 2025-07-21 ENCOUNTER — PATIENT OUTREACH (OUTPATIENT)
Dept: CARE COORDINATION | Facility: CLINIC | Age: 50
End: 2025-07-21
Payer: COMMERCIAL

## 2025-07-21 ENCOUNTER — VIRTUAL VISIT (OUTPATIENT)
Dept: PSYCHOLOGY | Facility: CLINIC | Age: 50
End: 2025-07-21
Payer: COMMERCIAL

## 2025-07-21 DIAGNOSIS — F41.1 GENERALIZED ANXIETY DISORDER: Primary | ICD-10-CM

## 2025-07-21 DIAGNOSIS — F33.2 SEVERE EPISODE OF RECURRENT MAJOR DEPRESSIVE DISORDER, WITHOUT PSYCHOTIC FEATURES (H): ICD-10-CM

## 2025-07-21 DIAGNOSIS — T40.2X5A CONSTIPATION DUE TO OPIOID THERAPY: ICD-10-CM

## 2025-07-21 DIAGNOSIS — F43.10 PTSD (POST-TRAUMATIC STRESS DISORDER): ICD-10-CM

## 2025-07-21 DIAGNOSIS — K59.03 CONSTIPATION DUE TO OPIOID THERAPY: ICD-10-CM

## 2025-07-21 DIAGNOSIS — K21.00 GASTROESOPHAGEAL REFLUX DISEASE WITH ESOPHAGITIS WITHOUT HEMORRHAGE: ICD-10-CM

## 2025-07-21 PROCEDURE — 90834 PSYTX W PT 45 MINUTES: CPT | Mod: 95 | Performed by: COUNSELOR

## 2025-07-21 NOTE — PROGRESS NOTES
Answers submitted by the patient for this visit:  Patient Health Questionnaire (Submitted on 7/20/2025)  If you checked off any problems, how difficult have these problems made it for you to do your work, take care of things at home, or get along with other people?: Extremely difficult  PHQ9 TOTAL SCORE: 23          [facility]                                     Progress Note    Patient Name: Radha Beckwith  Date: 7/21/25         Service Type: Individual      Session Start Time: 2:30pm Session End Time: 3:15pm     Session Length:  45    Session #: 87    Attendees: Client    Service Modality:  video      Provider verified identity through the following two step process.  Patient provided:  Patient is known previously to provider    Telemedicine Visit: The patient's condition can be safely assessed and treated via synchronous audio and visual telemedicine encounter.      Reason for Telemedicine Visit: Patient convenience (e.g. access to timely appointments / distance to available provider)    Originating Site (Patient Location): Patient's home    Distant Site (Provider Location): Provider Remote Setting- Home Office    Consent:  The patient/guardian has verbally consented to: the potential risks and benefits of telemedicine (video visit) versus in person care; bill my insurance or make self-payment for services provided; and responsibility for payment of non-covered services.     Patient would like the video invitation sent by:  My Chart    Mode of Communication:  telephone- client did not have access to wi/fi to be able to do video session    Distant Location (Provider):  Off-site home office    As the provider I attest to compliance with applicable laws and regulations related to telemedicine.    There has been demonstrated improvement in functioning while patient has been engaged in psychotherapy/psychological service- if withdrawn the patient would deteriorate and/or relapse.       DATA  Interactive Complexity: No  Crisis: No        Progress Since Last Session (Related to Symptoms / Goals / Homework):   Symptoms: No change .    Homework: Completed in session      Episode of Care Goals: Satisfactory progress - MAINTENANCE (Working to maintain change, with risk of relapse); Intervened by continuing to positively reinforce healthy behavior choice      Current / Ongoing Stressors and Concerns:   Client has been working on organization of her paperwork which has been hard for her.   Continued stress with medical conditions.     Treatment Objective(s) Addressed in This Session:   Increase interest, engagement, and pleasure in doing things  Feel less tired and more energy during the day        Intervention:   Reviewed medical appointment notes and talked about recent physical symptoms she's been experiencing. Discussed organizational skills and normalizing how it can be difficult and how to validate herself versus being so hard on herself.      Assessments completed prior to visit:  The following assessments were completed by patient for this visit:  PHQ9:       5/5/2025    11:49 AM 5/12/2025     2:03 PM 6/16/2025    11:27 AM 6/23/2025     2:05 PM 7/7/2025    10:44 AM 7/14/2025    12:49 PM 7/20/2025     3:00 PM   PHQ-9 SCORE   PHQ-9 Total Score MyChart 24 (Severe depression) 22 (Severe depression) 22 (Severe depression) 23 (Severe depression) 23 (Severe depression) 23 (Severe depression) 23 (Severe depression)   PHQ-9 Total Score 24  22  22  23  23  23  23        Patient-reported     GAD7:       2/24/2025    12:03 PM 3/17/2025    11:37 AM 4/21/2025     1:55 PM 5/5/2025    11:50 AM 6/9/2025     2:26 PM 6/23/2025     2:05 PM 7/7/2025    10:46 AM   BO-7 SCORE   Total Score 20 (severe anxiety) 20 (severe anxiety) 20 (severe anxiety) 19 (severe anxiety) 21 (severe anxiety) 19 (severe anxiety) 18 (severe anxiety)   Total Score 20  20  20  19  21  19  18        Patient-reported     PROMIS 10-Global  Health (all questions and answers displayed):       1/6/2025     3:31 PM 2/24/2025    12:00 PM 5/27/2025     1:59 PM 6/9/2025     2:28 PM 6/23/2025     2:08 PM 7/7/2025    10:48 AM 7/20/2025     3:04 PM   PROMIS 10   In general, would you say your health is: Poor Poor Fair Fair Poor Fair Poor   In general, would you say your quality of life is: Poor Poor Fair Poor Poor Fair Poor   In general, how would you rate your physical health? Fair Poor Poor Fair Poor Fair Poor   In general, how would you rate your mental health, including your mood and your ability to think? Poor Poor Poor Poor Poor Poor Poor   In general, how would you rate your satisfaction with your social activities and relationships? Fair Poor Poor Poor Poor Poor Fair   In general, please rate how well you carry out your usual social activities and roles Poor Poor Poor Poor Poor Fair Poor   To what extent are you able to carry out your everyday physical activities such as walking, climbing stairs, carrying groceries, or moving a chair? A little A little A little A little A little A little Moderately   In the past 7 days, how often have you been bothered by emotional problems such as feeling anxious, depressed, or irritable? Always Always Often Always Always Always Always   In the past 7 days, how would you rate your fatigue on average? Very severe Severe Severe Severe Very severe Very severe Very severe   In the past 7 days, how would you rate your pain on average, where 0 means no pain, and 10 means worst imaginable pain? 8 7 8 7 8 8 8   In general, would you say your health is: 1 1 2 2 1 2 1   In general, would you say your quality of life is: 1 1 2 1 1 2 1   In general, how would you rate your physical health? 2 1 1 2 1 2 1   In general, how would you rate your mental health, including your mood and your ability to think? 1 1 1 1 1 1 1   In general, how would you rate your satisfaction with your social activities and relationships? 2 1 1 1 1 1 2   In  general, please rate how well you carry out your usual social activities and roles. (This includes activities at home, at work and in your community, and responsibilities as a parent, child, spouse, employee, friend, etc.) 1 1 1 1 1 2 1   To what extent are you able to carry out your everyday physical activities such as walking, climbing stairs, carrying groceries, or moving a chair? 2 2 2 2 2 2 3   In the past 7 days, how often have you been bothered by emotional problems such as feeling anxious, depressed, or irritable? 5 5 4 5 5 5 5   In the past 7 days, how would you rate your fatigue on average? 5 4 4 4 5 5 5   In the past 7 days, how would you rate your pain on average, where 0 means no pain, and 10 means worst imaginable pain? 8 7 8 7 8 8 8   Global Mental Health Score 5  4  6  4  4  5  5    Global Physical Health Score 7  7  7  8  6  7  7    PROMIS TOTAL - SUBSCORES 12  11  13  12  10  12  12        Patient-reported     [place] Suicide Severity Rating Scale (Lifetime/Recent)      9/5/2024     5:33 PM 9/13/2024    10:52 AM 10/4/2024     4:54 PM 11/22/2024    11:34 AM 12/25/2024    11:30 PM 1/28/2025     1:23 PM 6/20/2025     8:31 PM   [place] Suicide Severity Rating (Lifetime/Recent)   Q1 Wished to be Dead (Past Month) 0-->no 0-->no 0-->no 0-->no 0-->no 0-->no 0-->no   Q2 Suicidal Thoughts (Past Month) 0-->no 0-->no 0-->no 0-->no 0-->no 0-->no 0-->no   Q6 Suicide Behavior (Lifetime) 1-->yes 0-->no 0-->no 0-->no 0-->no 0-->no 0-->no   If yes to Q6, within past 3 months? 0-->no         Level of Risk per Screen moderate risk  no risks indicated  no risks indicated  no risks indicated  no risks indicated  no risks indicated  no risks indicated       Data saved with a previous flowsheet row definition         ASSESSMENT: Current Emotional / Mental Status (status of significant symptoms):   Risk status (Self / Other harm or suicidal ideation)   Patient denies current fears or concerns for personal  safety.   Patient denies current or recent suicidal ideation or behaviors.   Patient denies current or recent homicidal ideation or behaviors.   Patient denies current or recent self injurious behavior or ideation.   Patient denies other safety concerns.   Patient reports there has been no change in risk factors since their last session.     Patient reports there has been no change in protective factors since their last session.     Recommended that patient call 911 or go to the local ED should there be a change in any of these risk factors.     Appearance:   Appropriate    Eye Contact:   Good    Psychomotor Behavior: Normal    Attitude:   Cooperative    Orientation:   All   Speech    Rate / Production: Normal/ Responsive Normal     Volume:  Normal    Mood:    Normal   Affect:    Appropriate    Thought Content:  Clear    Thought Form:  Coherent    Insight:    Good      Medication Review:   No changes to current psychiatric medication(s)     Medication Compliance:   Yes     Changes in Health Issues:   None reported     Chemical Use Review:   Substance Use: Chemical use reviewed, no active concerns identified      Tobacco Use: No change in amount of tobacco use since last session.  Patient declined discussion at this time    Diagnosis:  1. Generalized anxiety disorder    2. PTSD (post-traumatic stress disorder)    3. Severe episode of recurrent major depressive disorder, without psychotic features (H)            Collateral Reports Completed:   Not Applicable    PLAN: (Patient Tasks / Therapist Tasks / Other)  Continue to utilize self care and stress reduction skills daily.               Ricarda Bhatia, [facility]                                                         ______________________________________________________________________    Individual Treatment Plan    Patient's Name: Radha Beckwith  YOB: 1975    Date of Creation: 6/28/23  Date Treatment Plan Last Reviewed/Revised: 5/5/25    DSM5  Diagnoses: 296.32 (F33.1) Major Depressive Disorder, Recurrent Episode, Moderate _  Psychosocial / Contextual Factors: living with boyfriend, memory issues  PROMIS (reviewed every 90 days):     Referral / Collaboration:  Referral to another professional/service is not indicated at this time..    Anticipated number of session for this episode of care: 9-12 sessions  Anticipation frequency of session: as needed  Anticipated Duration of each session: 38-52 minutes  Treatment plan will be reviewed in 90 days or when goals have been changed.       MeasurableTreatment Goal(s) related to diagnosis / functional impairment(s)  Goal 1: Patient will increase communication skills with family members.    Objective #A (Patient Action)    Patient will learn & utilize at least 2 assertive communication skills weekly.  Status: Continued - Date(s): 5/5/25    Intervention(s)  Therapist will teach emotional regulation skills. distress tolerance skills, interpersonal effectiveness skills, emotion regluation skills, mindfulness skills, radical acceptance. Therapist will teach client how to ID body cues for anxiety, anxiety reduction techniques, how to ID triggers for depression and anxiety- decrease reactivity/eliminate, lifestyle changes to reduce depression and anxiety, communication skills, explore cognitive beliefs and help client to develop healthy cognitive patterns and beliefs.    Objective #B  Patient will use thought-stopping strategy daily to reduce intrusive thoughts.  Status: Continued - Date(s): 5/5/25    Intervention(s)  Therapist will teach emotional regulation skills. distress tolerance skills, interpersonal effectiveness skills, emotion regluation skills, mindfulness skills, radical acceptance. Therapist will teach client how to ID body cues for anxiety, anxiety reduction techniques, how to ID triggers for depression and anxiety- decrease reactivity/eliminate, lifestyle changes to reduce depression and anxiety,  communication skills, explore cognitive beliefs and help client to develop healthy cognitive patterns and beliefs.      Goal 2: Patient will decrease depression symptoms.      Objective #A (Patient Action)    Status: Continued - Date(s): 5/5/25    Patient will Increase interest, engagement, and pleasure in doing things  Decrease frequency and intensity of feeling down, depressed, hopeless  Improve quantity and quality of night time sleep / decrease daytime naps  Feel less tired and more energy during the day   Improve diet, appetite, mindful eating, and / or meal planning  Identify negative self-talk and behaviors: challenge core beliefs, myths, and actions  Improve concentration, focus, and mindfulness in daily activities   Feel less fidgety, restless or slow in daily activities / interpersonal interactions.    Intervention(s)  Therapist will teach emotional regulation skills. distress tolerance skills, interpersonal effectiveness skills, emotion regluation skills, mindfulness skills, radical acceptance. Therapist will teach client how to ID body cues for anxiety, anxiety reduction techniques, how to ID triggers for depression and anxiety- decrease reactivity/eliminate, lifestyle changes to reduce depression and anxiety, communication skills, explore cognitive beliefs and help client to develop healthy cognitive patterns and beliefs.    Objective #B  Patient will identify three distraction and diversion activities and use those activities to decrease level of anxiety  .    Status: Continued - Date(s):  5/5/25    Intervention(s)  Therapist will teach emotional regulation skills. distress tolerance skills, interpersonal effectiveness skills, emotion regluation skills, mindfulness skills, radical acceptance. Therapist will teach client how to ID body cues for anxiety, anxiety reduction techniques, how to ID triggers for depression and anxiety- decrease reactivity/eliminate, lifestyle changes to reduce depression and  anxiety, communication skills, explore cognitive beliefs and help client to develop healthy cognitive patterns and beliefs.      Patient has reviewed and agreed to the above plan.      ERIK Keller

## 2025-07-22 RX ORDER — BISACODYL 5 MG
TABLET, DELAYED RELEASE (ENTERIC COATED) ORAL
Qty: 30 TABLET | Refills: 2 | Status: SHIPPED | OUTPATIENT
Start: 2025-07-22

## 2025-07-23 ENCOUNTER — PATIENT OUTREACH (OUTPATIENT)
Dept: CARE COORDINATION | Facility: CLINIC | Age: 50
End: 2025-07-23
Payer: COMMERCIAL

## 2025-07-23 NOTE — TELEPHONE ENCOUNTER
PA Initiation    Medication: LINZESS 290 MCG PO CAPS  Insurance Company: [employer] - Phone [phone] Fax [phone]  Pharmacy Filling the Rx[facility] [place] [address] [place]   Filling Pharmacy Phone:    Filling Pharmacy Fax: [phone]  Start Date: 7/23/2025

## 2025-07-23 NOTE — TELEPHONE ENCOUNTER
Prior Authorization Approval    Medication: LINZESS 290 MCG PO CAPS  Authorization Effective Date: 4/24/2025  Authorization Expiration Date: 7/23/2026  Approved Dose/Quantity:   Reference #: [id number]   Insurance Company: [employer] - Phone [phone] Fax [phone]  Expected CoPay: $    CoPay Card Available:      Financial Assistance Needed:   Which Pharmacy is filling the prescription: [facility] [address]   Pharmacy Notified: YES  Patient Notified: Instructed pharmacy to notify patient when script is ready to pick-up/ship

## 2025-07-28 ENCOUNTER — PATIENT OUTREACH (OUTPATIENT)
Dept: CARE COORDINATION | Facility: CLINIC | Age: 50
End: 2025-07-28
Payer: COMMERCIAL

## 2025-07-28 ENCOUNTER — TELEPHONE (OUTPATIENT)
Dept: FAMILY MEDICINE | Facility: CLINIC | Age: 50
End: 2025-07-28
Payer: COMMERCIAL

## 2025-07-28 ENCOUNTER — VIRTUAL VISIT (OUTPATIENT)
Dept: PSYCHOLOGY | Facility: CLINIC | Age: 50
End: 2025-07-28
Payer: COMMERCIAL

## 2025-07-28 DIAGNOSIS — F41.1 GENERALIZED ANXIETY DISORDER: Primary | ICD-10-CM

## 2025-07-28 DIAGNOSIS — F33.2 SEVERE EPISODE OF RECURRENT MAJOR DEPRESSIVE DISORDER, WITHOUT PSYCHOTIC FEATURES (H): ICD-10-CM

## 2025-07-28 DIAGNOSIS — F43.10 PTSD (POST-TRAUMATIC STRESS DISORDER): ICD-10-CM

## 2025-07-28 PROCEDURE — 90834 PSYTX W PT 45 MINUTES: CPT | Mod: 95 | Performed by: COUNSELOR

## 2025-07-28 ASSESSMENT — ANXIETY QUESTIONNAIRES
7. FEELING AFRAID AS IF SOMETHING AWFUL MIGHT HAPPEN: NEARLY EVERY DAY
GAD7 TOTAL SCORE: 19
2. NOT BEING ABLE TO STOP OR CONTROL WORRYING: NEARLY EVERY DAY
8. IF YOU CHECKED OFF ANY PROBLEMS, HOW DIFFICULT HAVE THESE MADE IT FOR YOU TO DO YOUR WORK, TAKE CARE OF THINGS AT HOME, OR GET ALONG WITH OTHER PEOPLE?: EXTREMELY DIFFICULT
4. TROUBLE RELAXING: NEARLY EVERY DAY
1. FEELING NERVOUS, ANXIOUS, OR ON EDGE: NEARLY EVERY DAY
6. BECOMING EASILY ANNOYED OR IRRITABLE: MORE THAN HALF THE DAYS
IF YOU CHECKED OFF ANY PROBLEMS ON THIS QUESTIONNAIRE, HOW DIFFICULT HAVE THESE PROBLEMS MADE IT FOR YOU TO DO YOUR WORK, TAKE CARE OF THINGS AT HOME, OR GET ALONG WITH OTHER PEOPLE: EXTREMELY DIFFICULT
5. BEING SO RESTLESS THAT IT IS HARD TO SIT STILL: MORE THAN HALF THE DAYS
GAD7 TOTAL SCORE: 19
GAD7 TOTAL SCORE: 19
3. WORRYING TOO MUCH ABOUT DIFFERENT THINGS: NEARLY EVERY DAY
7. FEELING AFRAID AS IF SOMETHING AWFUL MIGHT HAPPEN: NEARLY EVERY DAY

## 2025-07-28 NOTE — PROGRESS NOTES
Answers submitted by the patient for this visit:  Patient Health Questionnaire (Submitted on 7/28/2025)  If you checked off any problems, how difficult have these problems made it for you to do your work, take care of things at home, or get along with other people?: Extremely difficult  PHQ9 TOTAL SCORE: 22  Patient Health Questionnaire (G7) (Submitted on 7/28/2025)  BO 7 TOTAL SCORE: 19          [facility]                                     Progress Note    Patient Name: Radha Beckwith  Date: 7/28/25         Service Type: Individual      Session Start Time: 2:30pm Session End Time: 3:20pm     Session Length:  50    Session #: 88    Attendees: Client    Service Modality:  video      Provider verified identity through the following two step process.  Patient provided:  Patient is known previously to provider    Telemedicine Visit: The patient's condition can be safely assessed and treated via synchronous audio and visual telemedicine encounter.      Reason for Telemedicine Visit: Patient convenience (e.g. access to timely appointments / distance to available provider)    Originating Site (Patient Location): Patient's home    Distant Site (Provider Location): Provider Remote Setting- Home Office    Consent:  The patient/guardian has verbally consented to: the potential risks and benefits of telemedicine (video visit) versus in person care; bill my insurance or make self-payment for services provided; and responsibility for payment of non-covered services.     Patient would like the video invitation sent by:  My Chart    Mode of Communication:  telephone- client did not have access to wi/fi to be able to do video session    Distant Location (Provider):  Off-site home office    As the provider I attest to compliance with applicable laws and regulations related to telemedicine.    There has been demonstrated improvement in functioning while patient has been engaged in psychotherapy/psychological  service- if withdrawn the patient would deteriorate and/or relapse.      DATA  Interactive Complexity: No  Crisis: No        Progress Since Last Session (Related to Symptoms / Goals / Homework):   Symptoms: No change .    Homework: Completed in session      Episode of Care Goals: Satisfactory progress - MAINTENANCE (Working to maintain change, with risk of relapse); Intervened by continuing to positively reinforce healthy behavior choice      Current / Ongoing Stressors and Concerns:   Concerned about health issues with GERD.    Feeling like she's having more memory issues.    Using a heart monitor thing due to getting dizzy/light headed when she gets up.   MRI tomorrow.      Treatment Objective(s) Addressed in This Session:   Increase interest, engagement, and pleasure in doing things  Feel less tired and more energy during the day        Intervention:   Client is having a lot more forgetfulness happening lately. This was witnessed on the call today. Client would forget what she was saying in the middle of her thought. Hopefully MRI will rosetta some answers. Processed worries around potential results with the MRI. Client stated her dad  of an aneurysm. Validated this fear and directed client to reframe- she is taking precautions to rule things out now and get treatment earlier. Redirected to continue to work on being in the present moment to decrease worry.     Assessments completed prior to visit:  The following assessments were completed by patient for this visit:  PHQ9:       2025     2:03 PM 2025    11:27 AM 2025     2:05 PM 2025    10:44 AM 2025    12:49 PM 2025     3:00 PM 2025    11:08 AM   PHQ-9 SCORE   PHQ-9 Total Score MyChart 22 (Severe depression) 22 (Severe depression) 23 (Severe depression) 23 (Severe depression) 23 (Severe depression) 23 (Severe depression) 22 (Severe depression)   PHQ-9 Total Score 22  22  23  23  23  23  22        Patient-reported     GAD7:        3/17/2025    11:37 AM 4/21/2025     1:55 PM 5/5/2025    11:50 AM 6/9/2025     2:26 PM 6/23/2025     2:05 PM 7/7/2025    10:46 AM 7/28/2025    11:09 AM   BO-7 SCORE   Total Score 20 (severe anxiety) 20 (severe anxiety) 19 (severe anxiety) 21 (severe anxiety) 19 (severe anxiety) 18 (severe anxiety) 19 (severe anxiety)   Total Score 20  20  19  21  19  18  19        Patient-reported     PROMIS 10-Global Health (all questions and answers displayed):       2/24/2025    12:00 PM 5/27/2025     1:59 PM 6/9/2025     2:28 PM 6/23/2025     2:08 PM 7/7/2025    10:48 AM 7/20/2025     3:04 PM 7/28/2025    11:12 AM   PROMIS 10   In general, would you say your health is: Poor Fair Fair Poor Fair Poor Poor   In general, would you say your quality of life is: Poor Fair Poor Poor Fair Poor Poor   In general, how would you rate your physical health? Poor Poor Fair Poor Fair Poor Poor   In general, how would you rate your mental health, including your mood and your ability to think? Poor Poor Poor Poor Poor Poor Poor   In general, how would you rate your satisfaction with your social activities and relationships? Poor Poor Poor Poor Poor Fair Poor   In general, please rate how well you carry out your usual social activities and roles Poor Poor Poor Poor Fair Poor Poor   To what extent are you able to carry out your everyday physical activities such as walking, climbing stairs, carrying groceries, or moving a chair? A little A little A little A little A little Moderately A little   In the past 7 days, how often have you been bothered by emotional problems such as feeling anxious, depressed, or irritable? Always Often Always Always Always Always Always   In the past 7 days, how would you rate your fatigue on average? Severe Severe Severe Very severe Very severe Very severe Very severe   In the past 7 days, how would you rate your pain on average, where 0 means no pain, and 10 means worst imaginable pain? 7 8 7 8 8 8 7   In general,  would you say your health is: 1 2 2 1 2 1 1   In general, would you say your quality of life is: 1 2 1 1 2 1 1   In general, how would you rate your physical health? 1 1 2 1 2 1 1   In general, how would you rate your mental health, including your mood and your ability to think? 1 1 1 1 1 1 1   In general, how would you rate your satisfaction with your social activities and relationships? 1 1 1 1 1 2 1   In general, please rate how well you carry out your usual social activities and roles. (This includes activities at home, at work and in your community, and responsibilities as a parent, child, spouse, employee, friend, etc.) 1 1 1 1 2 1 1   To what extent are you able to carry out your everyday physical activities such as walking, climbing stairs, carrying groceries, or moving a chair? 2 2 2 2 2 3 2   In the past 7 days, how often have you been bothered by emotional problems such as feeling anxious, depressed, or irritable? 5 4 5 5 5 5 5   In the past 7 days, how would you rate your fatigue on average? 4 4 4 5 5 5 5   In the past 7 days, how would you rate your pain on average, where 0 means no pain, and 10 means worst imaginable pain? 7 8 7 8 8 8 7   Global Mental Health Score 4  6  4  4  5  5  4    Global Physical Health Score 7  7  8  6  7  7  6    PROMIS TOTAL - SUBSCORES 11  13  12  10  12  12  10        Patient-reported     Wilbarger Suicide Severity Rating Scale (Lifetime/Recent)      9/5/2024     5:33 PM 9/13/2024    10:52 AM 10/4/2024     4:54 PM 11/22/2024    11:34 AM 12/25/2024    11:30 PM 1/28/2025     1:23 PM 6/20/2025     8:31 PM   Wilbarger Suicide Severity Rating (Lifetime/Recent)   Q1 Wished to be Dead (Past Month) 0-->no 0-->no 0-->no 0-->no 0-->no 0-->no 0-->no   Q2 Suicidal Thoughts (Past Month) 0-->no 0-->no 0-->no 0-->no 0-->no 0-->no 0-->no   Q6 Suicide Behavior (Lifetime) 1-->yes 0-->no 0-->no 0-->no 0-->no 0-->no 0-->no   If yes to Q6, within past 3 months? 0-->no         Level of Risk per  Screen moderate risk  no risks indicated  no risks indicated  no risks indicated  no risks indicated  no risks indicated  no risks indicated       Data saved with a previous flowsheet row definition         ASSESSMENT: Current Emotional / Mental Status (status of significant symptoms):   Risk status (Self / Other harm or suicidal ideation)   Patient denies current fears or concerns for personal safety.   Patient denies current or recent suicidal ideation or behaviors.   Patient denies current or recent homicidal ideation or behaviors.   Patient denies current or recent self injurious behavior or ideation.   Patient denies other safety concerns.   Patient reports there has been no change in risk factors since their last session.     Patient reports there has been no change in protective factors since their last session.     Recommended that patient call 911 or go to the local ED should there be a change in any of these risk factors.     Appearance:   Appropriate    Eye Contact:   Good    Psychomotor Behavior: Normal    Attitude:   Cooperative    Orientation:   All   Speech    Rate / Production: Normal/ Responsive Normal     Volume:  Normal    Mood:    Normal   Affect:    Appropriate    Thought Content:  Clear    Thought Form:  Coherent    Insight:    Good      Medication Review:   No changes to current psychiatric medication(s)     Medication Compliance:   Yes     Changes in Health Issues:   None reported     Chemical Use Review:   Substance Use: Chemical use reviewed, no active concerns identified      Tobacco Use: No change in amount of tobacco use since last session.  Patient declined discussion at this time    Diagnosis:  1. Generalized anxiety disorder    2. PTSD (post-traumatic stress disorder)    3. Severe episode of recurrent major depressive disorder, without psychotic features (H)            Collateral Reports Completed:   Not Applicable    PLAN: (Patient Tasks / Therapist Tasks / Other)  Continue to  utilize self care and stress reduction skills daily.               Ricarda Bhatia, LPCC                                                         ______________________________________________________________________    Individual Treatment Plan    Patient's Name: Radha Beckwith  YOB: 1975    Date of Creation: 6/28/23  Date Treatment Plan Last Reviewed/Revised: 5/5/25    DSM5 Diagnoses: 296.32 (F33.1) Major Depressive Disorder, Recurrent Episode, Moderate _  Psychosocial / Contextual Factors: living with boyfriend, memory issues  PROMIS (reviewed every 90 days):     Referral / Collaboration:  Referral to another professional/service is not indicated at this time..    Anticipated number of session for this episode of care: 9-12 sessions  Anticipation frequency of session: as needed  Anticipated Duration of each session: 38-52 minutes  Treatment plan will be reviewed in 90 days or when goals have been changed.       MeasurableTreatment Goal(s) related to diagnosis / functional impairment(s)  Goal 1: Patient will increase communication skills with family members.    Objective #A (Patient Action)    Patient will learn & utilize at least 2 assertive communication skills weekly.  Status: Continued - Date(s): 5/5/25    Intervention(s)  Therapist will teach emotional regulation skills. distress tolerance skills, interpersonal effectiveness skills, emotion regluation skills, mindfulness skills, radical acceptance. Therapist will teach client how to ID body cues for anxiety, anxiety reduction techniques, how to ID triggers for depression and anxiety- decrease reactivity/eliminate, lifestyle changes to reduce depression and anxiety, communication skills, explore cognitive beliefs and help client to develop healthy cognitive patterns and beliefs.    Objective #B  Patient will use thought-stopping strategy daily to reduce intrusive thoughts.  Status: Continued - Date(s): 5/5/25    Intervention(s)  Therapist  will teach emotional regulation skills. distress tolerance skills, interpersonal effectiveness skills, emotion regluation skills, mindfulness skills, radical acceptance. Therapist will teach client how to ID body cues for anxiety, anxiety reduction techniques, how to ID triggers for depression and anxiety- decrease reactivity/eliminate, lifestyle changes to reduce depression and anxiety, communication skills, explore cognitive beliefs and help client to develop healthy cognitive patterns and beliefs.      Goal 2: Patient will decrease depression symptoms.      Objective #A (Patient Action)    Status: Continued - Date(s): 5/5/25    Patient will Increase interest, engagement, and pleasure in doing things  Decrease frequency and intensity of feeling down, depressed, hopeless  Improve quantity and quality of night time sleep / decrease daytime naps  Feel less tired and more energy during the day   Improve diet, appetite, mindful eating, and / or meal planning  Identify negative self-talk and behaviors: challenge core beliefs, myths, and actions  Improve concentration, focus, and mindfulness in daily activities   Feel less fidgety, restless or slow in daily activities / interpersonal interactions.    Intervention(s)  Therapist will teach emotional regulation skills. distress tolerance skills, interpersonal effectiveness skills, emotion regluation skills, mindfulness skills, radical acceptance. Therapist will teach client how to ID body cues for anxiety, anxiety reduction techniques, how to ID triggers for depression and anxiety- decrease reactivity/eliminate, lifestyle changes to reduce depression and anxiety, communication skills, explore cognitive beliefs and help client to develop healthy cognitive patterns and beliefs.    Objective #B  Patient will identify three distraction and diversion activities and use those activities to decrease level of anxiety  .    Status: Continued - Date(s):   5/5/25    Intervention(s)  Therapist will teach emotional regulation skills. distress tolerance skills, interpersonal effectiveness skills, emotion regluation skills, mindfulness skills, radical acceptance. Therapist will teach client how to ID body cues for anxiety, anxiety reduction techniques, how to ID triggers for depression and anxiety- decrease reactivity/eliminate, lifestyle changes to reduce depression and anxiety, communication skills, explore cognitive beliefs and help client to develop healthy cognitive patterns and beliefs.      Patient has reviewed and agreed to the above plan.      Ricarda Bhatia [facility]

## 2025-07-28 NOTE — PROGRESS NOTES
Clinic Care Coordination Contact  Care Team Conversations    Mychart message sent to pt for next scheduled check in time/day.     If no response in one week will resend.     Wilda Herrmann JACK   [facility] - Care Coordination  [facility]   [phone]

## 2025-07-28 NOTE — TELEPHONE ENCOUNTER
Patient calling to state she has MRI, and MRA tomorrow 07/28.     She is concerned about the   Anxiety of procedures (currently prescribed alprazolam 0.5 mg 1-2 tablets daily PRN). She is concerned that is not going to be enough for her lengthy back to back procedure.   Pain due to laying on table.     She is wondering if there is anything available to help her complete these procedures. Her appointment is at 7:30. Preferred pharmacy is [facility], but they do not open until 8 AM tomorrow.     RN unsure if she has pain contract or not with pain clinic.     Jayjay Mathias RN on 7/28/2025 at 4:38 PM

## 2025-07-30 ENCOUNTER — TELEPHONE (OUTPATIENT)
Dept: OTOLARYNGOLOGY | Facility: CLINIC | Age: 50
End: 2025-07-30
Payer: COMMERCIAL

## 2025-07-30 DIAGNOSIS — F41.9 ANXIETY: Primary | ICD-10-CM

## 2025-07-30 RX ORDER — LORAZEPAM 0.5 MG/1
0.5 TABLET ORAL EVERY 6 HOURS PRN
Qty: 1 TABLET | Refills: 0 | Status: SHIPPED | OUTPATIENT
Start: 2025-07-30

## 2025-07-30 NOTE — TELEPHONE ENCOUNTER
[facility] Call Center    Phone Message    May a detailed message be left on voicemail: yes     Reason for Call: Other: Pt would like to know if she can have pain medication for the MRI/Mra on 8/5/25.   Please call pt back.  [place] location,  Thanks      Action Taken: Other: ENT    Travel Screening: Not Applicable     Date of Service:

## 2025-07-30 NOTE — TELEPHONE ENCOUNTER
Returned call to patient. Informed her that Ativan was sent to her pharmacy to take 60 minutes prior to the MRI/MRA. She has anxiety and chronic pain. Worried about the pain and anxiety during the imaging. Patient will call pharmacy to ensure this will be ok with her chronic medications.    Edith Wiley RN on 7/30/2025 at 1:28 PM

## 2025-08-05 ENCOUNTER — HOSPITAL ENCOUNTER (OUTPATIENT)
Dept: MRI IMAGING | Facility: CLINIC | Age: 50
Discharge: HOME | End: 2025-08-05
Attending: OTOLARYNGOLOGY
Payer: COMMERCIAL

## 2025-08-05 DIAGNOSIS — H93.A3 PULSATILE TINNITUS OF BOTH EARS: ICD-10-CM

## 2025-08-05 PROCEDURE — 255N000002 HC RX 255 OP 636: Performed by: OTOLARYNGOLOGY

## 2025-08-05 PROCEDURE — 70553 MRI BRAIN STEM W/O & W/DYE: CPT

## 2025-08-05 PROCEDURE — A9585 GADOBUTROL INJECTION: HCPCS | Performed by: OTOLARYNGOLOGY

## 2025-08-05 PROCEDURE — 70544 MR ANGIOGRAPHY HEAD W/O DYE: CPT

## 2025-08-05 RX ORDER — GADOBUTROL 604.72 MG/ML
7.5 INJECTION INTRAVENOUS ONCE
Status: COMPLETED | OUTPATIENT
Start: 2025-08-05 | End: 2025-08-05

## 2025-08-05 RX ADMIN — GADOBUTROL 7.5 ML: 604.72 INJECTION INTRAVENOUS at 14:49

## 2025-08-06 ENCOUNTER — RESULTS FOLLOW-UP (OUTPATIENT)
Dept: OTOLARYNGOLOGY | Facility: OTHER | Age: 50
End: 2025-08-06
Payer: COMMERCIAL

## 2025-08-06 DIAGNOSIS — K21.00 GASTROESOPHAGEAL REFLUX DISEASE WITH ESOPHAGITIS WITHOUT HEMORRHAGE: ICD-10-CM

## 2025-08-07 RX ORDER — FAMOTIDINE 40 MG/1
40 TABLET, FILM COATED ORAL 2 TIMES DAILY
Qty: 180 TABLET | Refills: 2 | Status: SHIPPED | OUTPATIENT
Start: 2025-08-07

## 2025-08-11 ENCOUNTER — PATIENT OUTREACH (OUTPATIENT)
Dept: CARE COORDINATION | Facility: CLINIC | Age: 50
End: 2025-08-11
Payer: COMMERCIAL

## 2025-08-11 DIAGNOSIS — J32.3 SINUSITIS CHRONIC, SPHENOIDAL: Primary | ICD-10-CM

## 2025-08-11 RX ORDER — FLUTICASONE PROPIONATE 50 MCG
2 SPRAY, SUSPENSION (ML) NASAL DAILY
Qty: 16 G | Refills: 1 | Status: SHIPPED | OUTPATIENT
Start: 2025-08-11

## 2025-08-11 RX ORDER — CEFDINIR 300 MG/1
300 CAPSULE ORAL 2 TIMES DAILY
Qty: 42 CAPSULE | Refills: 0 | Status: SHIPPED | OUTPATIENT
Start: 2025-08-11 | End: 2025-09-01

## 2025-08-12 ENCOUNTER — VIRTUAL VISIT (OUTPATIENT)
Dept: PSYCHOLOGY | Facility: OTHER | Age: 50
End: 2025-08-12
Payer: COMMERCIAL

## 2025-08-12 DIAGNOSIS — F33.2 SEVERE EPISODE OF RECURRENT MAJOR DEPRESSIVE DISORDER, WITHOUT PSYCHOTIC FEATURES (H): ICD-10-CM

## 2025-08-12 DIAGNOSIS — F41.1 GENERALIZED ANXIETY DISORDER: Primary | ICD-10-CM

## 2025-08-12 DIAGNOSIS — F43.10 PTSD (POST-TRAUMATIC STRESS DISORDER): ICD-10-CM

## 2025-08-12 PROCEDURE — 90834 PSYTX W PT 45 MINUTES: CPT | Mod: 95 | Performed by: COUNSELOR

## 2025-08-12 ASSESSMENT — ANXIETY QUESTIONNAIRES
3. WORRYING TOO MUCH ABOUT DIFFERENT THINGS: NEARLY EVERY DAY
4. TROUBLE RELAXING: NEARLY EVERY DAY
2. NOT BEING ABLE TO STOP OR CONTROL WORRYING: NEARLY EVERY DAY
7. FEELING AFRAID AS IF SOMETHING AWFUL MIGHT HAPPEN: NEARLY EVERY DAY
5. BEING SO RESTLESS THAT IT IS HARD TO SIT STILL: NEARLY EVERY DAY
1. FEELING NERVOUS, ANXIOUS, OR ON EDGE: NEARLY EVERY DAY
GAD7 TOTAL SCORE: 20
5. BEING SO RESTLESS THAT IT IS HARD TO SIT STILL: NEARLY EVERY DAY
1. FEELING NERVOUS, ANXIOUS, OR ON EDGE: NEARLY EVERY DAY
GAD7 TOTAL SCORE: 20
GAD7 TOTAL SCORE: 20
7. FEELING AFRAID AS IF SOMETHING AWFUL MIGHT HAPPEN: NEARLY EVERY DAY
8. IF YOU CHECKED OFF ANY PROBLEMS, HOW DIFFICULT HAVE THESE MADE IT FOR YOU TO DO YOUR WORK, TAKE CARE OF THINGS AT HOME, OR GET ALONG WITH OTHER PEOPLE?: EXTREMELY DIFFICULT
GAD7 TOTAL SCORE: 20
4. TROUBLE RELAXING: NEARLY EVERY DAY
3. WORRYING TOO MUCH ABOUT DIFFERENT THINGS: NEARLY EVERY DAY
7. FEELING AFRAID AS IF SOMETHING AWFUL MIGHT HAPPEN: NEARLY EVERY DAY
2. NOT BEING ABLE TO STOP OR CONTROL WORRYING: NEARLY EVERY DAY
IF YOU CHECKED OFF ANY PROBLEMS ON THIS QUESTIONNAIRE, HOW DIFFICULT HAVE THESE PROBLEMS MADE IT FOR YOU TO DO YOUR WORK, TAKE CARE OF THINGS AT HOME, OR GET ALONG WITH OTHER PEOPLE: EXTREMELY DIFFICULT
6. BECOMING EASILY ANNOYED OR IRRITABLE: MORE THAN HALF THE DAYS
IF YOU CHECKED OFF ANY PROBLEMS ON THIS QUESTIONNAIRE, HOW DIFFICULT HAVE THESE PROBLEMS MADE IT FOR YOU TO DO YOUR WORK, TAKE CARE OF THINGS AT HOME, OR GET ALONG WITH OTHER PEOPLE: EXTREMELY DIFFICULT
GAD7 TOTAL SCORE: 20
6. BECOMING EASILY ANNOYED OR IRRITABLE: MORE THAN HALF THE DAYS
8. IF YOU CHECKED OFF ANY PROBLEMS, HOW DIFFICULT HAVE THESE MADE IT FOR YOU TO DO YOUR WORK, TAKE CARE OF THINGS AT HOME, OR GET ALONG WITH OTHER PEOPLE?: EXTREMELY DIFFICULT

## 2025-08-14 ENCOUNTER — HOSPITAL ENCOUNTER (EMERGENCY)
Facility: CLINIC | Age: 50
Discharge: HOME | End: 2025-08-14
Attending: EMERGENCY MEDICINE
Payer: COMMERCIAL

## 2025-08-14 VITALS
BODY MASS INDEX: 27.66 KG/M2 | RESPIRATION RATE: 16 BRPM | TEMPERATURE: 97.5 F | OXYGEN SATURATION: 92 % | WEIGHT: 162 LBS | HEART RATE: 85 BPM | DIASTOLIC BLOOD PRESSURE: 87 MMHG | HEIGHT: 64 IN | SYSTOLIC BLOOD PRESSURE: 128 MMHG

## 2025-08-14 DIAGNOSIS — M54.41 ACUTE MIDLINE LOW BACK PAIN WITH BILATERAL SCIATICA: Primary | ICD-10-CM

## 2025-08-14 DIAGNOSIS — M54.42 ACUTE MIDLINE LOW BACK PAIN WITH BILATERAL SCIATICA: Primary | ICD-10-CM

## 2025-08-14 PROCEDURE — 96372 THER/PROPH/DIAG INJ SC/IM: CPT | Performed by: EMERGENCY MEDICINE

## 2025-08-14 PROCEDURE — 250N000011 HC RX IP 250 OP 636: Performed by: EMERGENCY MEDICINE

## 2025-08-14 PROCEDURE — 250N000013 HC RX MED GY IP 250 OP 250 PS 637: Performed by: EMERGENCY MEDICINE

## 2025-08-14 PROCEDURE — 99283 EMERGENCY DEPT VISIT LOW MDM: CPT | Performed by: EMERGENCY MEDICINE

## 2025-08-14 PROCEDURE — 99284 EMERGENCY DEPT VISIT MOD MDM: CPT | Performed by: EMERGENCY MEDICINE

## 2025-08-14 RX ORDER — ORPHENADRINE CITRATE 30 MG/ML
60 INJECTION INTRAMUSCULAR; INTRAVENOUS ONCE
Status: COMPLETED | OUTPATIENT
Start: 2025-08-14 | End: 2025-08-14

## 2025-08-14 RX ORDER — HYDROMORPHONE HYDROCHLORIDE 2 MG/1
2 TABLET ORAL ONCE
Refills: 0 | Status: COMPLETED | OUTPATIENT
Start: 2025-08-14 | End: 2025-08-14

## 2025-08-14 RX ORDER — HYDROXYZINE HYDROCHLORIDE 25 MG/1
50 TABLET, FILM COATED ORAL ONCE
Status: COMPLETED | OUTPATIENT
Start: 2025-08-14 | End: 2025-08-14

## 2025-08-14 RX ORDER — OXYCODONE HYDROCHLORIDE 5 MG/1
5 TABLET ORAL EVERY 6 HOURS PRN
Qty: 12 TABLET | Refills: 0 | Status: SHIPPED | OUTPATIENT
Start: 2025-08-14 | End: 2025-08-14 | Stop reason: ALTCHOICE

## 2025-08-14 RX ORDER — CYCLOBENZAPRINE HCL 10 MG
10 TABLET ORAL 3 TIMES DAILY PRN
Qty: 21 TABLET | Refills: 0 | Status: SHIPPED | OUTPATIENT
Start: 2025-08-14 | End: 2025-08-21

## 2025-08-14 RX ORDER — OXYCODONE HYDROCHLORIDE 5 MG/1
5 TABLET ORAL EVERY 6 HOURS PRN
Qty: 12 TABLET | Refills: 0 | Status: SHIPPED | OUTPATIENT
Start: 2025-08-14

## 2025-08-14 RX ORDER — METHYLPREDNISOLONE 4 MG/1
TABLET ORAL
Qty: 21 TABLET | Refills: 0 | Status: SHIPPED | OUTPATIENT
Start: 2025-08-14

## 2025-08-14 RX ORDER — OXYCODONE HYDROCHLORIDE 5 MG/1
10 TABLET ORAL ONCE
Refills: 0 | Status: COMPLETED | OUTPATIENT
Start: 2025-08-14 | End: 2025-08-14

## 2025-08-14 RX ORDER — DEXAMETHASONE SODIUM PHOSPHATE 10 MG/ML
10 INJECTION, SOLUTION INTRAMUSCULAR; INTRAVENOUS ONCE
Status: COMPLETED | OUTPATIENT
Start: 2025-08-14 | End: 2025-08-14

## 2025-08-14 RX ORDER — HYDROCODONE BITARTRATE AND ACETAMINOPHEN 5; 325 MG/1; MG/1
2 TABLET ORAL EVERY 6 HOURS PRN
Qty: 15 TABLET | Refills: 0 | Status: SHIPPED | OUTPATIENT
Start: 2025-08-14 | End: 2025-08-14

## 2025-08-14 RX ADMIN — OXYCODONE HYDROCHLORIDE 10 MG: 5 TABLET ORAL at 08:21

## 2025-08-14 RX ADMIN — HYDROMORPHONE HYDROCHLORIDE 2 MG: 2 TABLET ORAL at 09:30

## 2025-08-14 RX ADMIN — HYDROXYZINE HYDROCHLORIDE 50 MG: 25 TABLET ORAL at 09:30

## 2025-08-14 RX ADMIN — ORPHENADRINE CITRATE 60 MG: 60 INJECTION INTRAMUSCULAR; INTRAVENOUS at 08:24

## 2025-08-14 RX ADMIN — DEXAMETHASONE SODIUM PHOSPHATE 10 MG: 10 INJECTION, SOLUTION INTRAMUSCULAR; INTRAVENOUS at 08:25

## 2025-08-14 ASSESSMENT — ACTIVITIES OF DAILY LIVING (ADL)
ADLS_ACUITY_SCORE: 42

## 2025-08-18 ENCOUNTER — VIRTUAL VISIT (OUTPATIENT)
Dept: PSYCHOLOGY | Facility: CLINIC | Age: 50
End: 2025-08-18
Payer: COMMERCIAL

## 2025-08-18 DIAGNOSIS — F33.2 SEVERE EPISODE OF RECURRENT MAJOR DEPRESSIVE DISORDER, WITHOUT PSYCHOTIC FEATURES (H): ICD-10-CM

## 2025-08-18 DIAGNOSIS — F41.1 GENERALIZED ANXIETY DISORDER: Primary | ICD-10-CM

## 2025-08-18 DIAGNOSIS — F43.10 PTSD (POST-TRAUMATIC STRESS DISORDER): ICD-10-CM

## 2025-08-18 PROCEDURE — 90834 PSYTX W PT 45 MINUTES: CPT | Mod: 95 | Performed by: COUNSELOR

## 2025-08-21 ENCOUNTER — VIRTUAL VISIT (OUTPATIENT)
Dept: PALLIATIVE MEDICINE | Facility: CLINIC | Age: 50
End: 2025-08-21
Payer: COMMERCIAL

## 2025-08-21 DIAGNOSIS — R20.8 ALLODYNIA: ICD-10-CM

## 2025-08-21 DIAGNOSIS — M54.42 CHRONIC BILATERAL LOW BACK PAIN WITH BILATERAL SCIATICA: ICD-10-CM

## 2025-08-21 DIAGNOSIS — G89.29 CHRONIC BILATERAL THORACIC BACK PAIN: ICD-10-CM

## 2025-08-21 DIAGNOSIS — Z98.890: ICD-10-CM

## 2025-08-21 DIAGNOSIS — M54.41 CHRONIC BILATERAL LOW BACK PAIN WITH BILATERAL SCIATICA: ICD-10-CM

## 2025-08-21 DIAGNOSIS — M54.6 CHRONIC BILATERAL THORACIC BACK PAIN: ICD-10-CM

## 2025-08-21 DIAGNOSIS — M25.50 MULTIPLE JOINT PAIN: ICD-10-CM

## 2025-08-21 DIAGNOSIS — G89.29 CHRONIC BILATERAL LOW BACK PAIN WITH BILATERAL SCIATICA: ICD-10-CM

## 2025-08-21 DIAGNOSIS — M54.2 CHRONIC NECK PAIN: ICD-10-CM

## 2025-08-21 DIAGNOSIS — G89.4 CHRONIC PAIN SYNDROME: ICD-10-CM

## 2025-08-21 DIAGNOSIS — M47.812 SPONDYLOSIS OF CERVICAL REGION WITHOUT MYELOPATHY OR RADICULOPATHY: ICD-10-CM

## 2025-08-21 DIAGNOSIS — M79.18 MYOFASCIAL PAIN: ICD-10-CM

## 2025-08-21 DIAGNOSIS — G89.29 CHRONIC NECK PAIN: ICD-10-CM

## 2025-08-21 DIAGNOSIS — M53.3 SI (SACROILIAC) JOINT DYSFUNCTION: Primary | ICD-10-CM

## 2025-08-21 DIAGNOSIS — M62.838 MUSCLE SPASM: ICD-10-CM

## 2025-08-21 PROCEDURE — 98006 SYNCH AUDIO-VIDEO EST MOD 30: CPT

## 2025-08-21 RX ORDER — DULOXETIN HYDROCHLORIDE 60 MG/1
60 CAPSULE, DELAYED RELEASE ORAL 2 TIMES DAILY
Qty: 60 CAPSULE | Refills: 2 | Status: SHIPPED | OUTPATIENT
Start: 2025-08-21

## 2025-08-21 ASSESSMENT — PAIN SCALES - PAIN ENJOYMENT GENERAL ACTIVITY SCALE (PEG)
AVG_PAIN_PASTWEEK: 8
INTERFERED_GENERAL_ACTIVITY: 10
PEG_TOTALSCORE: 9.33
INTERFERED_ENJOYMENT_LIFE: 10
INTERFERED_ENJOYMENT_LIFE: 10 - COMPLETELY INTERFERES
INTERFERED_GENERAL_ACTIVITY: 10 - COMPLETELY INTERFERES

## 2025-08-21 ASSESSMENT — PAIN SCALES - GENERAL: PAINLEVEL_OUTOF10: SEVERE PAIN (8)

## 2025-08-25 ENCOUNTER — HOSPITAL ENCOUNTER (OUTPATIENT)
Dept: CT IMAGING | Facility: CLINIC | Age: 50
Discharge: HOME | End: 2025-08-25
Attending: OTOLARYNGOLOGY | Admitting: OTOLARYNGOLOGY
Payer: COMMERCIAL

## 2025-08-25 ENCOUNTER — OFFICE VISIT (OUTPATIENT)
Dept: OTOLARYNGOLOGY | Facility: CLINIC | Age: 50
End: 2025-08-25
Payer: COMMERCIAL

## 2025-08-25 VITALS
DIASTOLIC BLOOD PRESSURE: 82 MMHG | BODY MASS INDEX: 28.17 KG/M2 | WEIGHT: 165 LBS | HEIGHT: 64 IN | TEMPERATURE: 97.4 F | SYSTOLIC BLOOD PRESSURE: 122 MMHG

## 2025-08-25 DIAGNOSIS — J32.3 CHRONIC SPHENOIDAL SINUSITIS: Primary | ICD-10-CM

## 2025-08-25 DIAGNOSIS — M26.609 TMJ (TEMPOROMANDIBULAR JOINT SYNDROME): ICD-10-CM

## 2025-08-25 DIAGNOSIS — H93.A3 PULSATILE TINNITUS OF BOTH EARS: ICD-10-CM

## 2025-08-25 DIAGNOSIS — E78.2 MIXED HYPERLIPIDEMIA: ICD-10-CM

## 2025-08-25 DIAGNOSIS — R11.0 NAUSEA: ICD-10-CM

## 2025-08-25 DIAGNOSIS — J32.3 SINUSITIS CHRONIC, SPHENOIDAL: ICD-10-CM

## 2025-08-25 DIAGNOSIS — K11.7 XEROSTOMIA: ICD-10-CM

## 2025-08-25 DIAGNOSIS — F41.9 ANXIETY: ICD-10-CM

## 2025-08-25 PROCEDURE — 99214 OFFICE O/P EST MOD 30 MIN: CPT | Mod: 25 | Performed by: OTOLARYNGOLOGY

## 2025-08-25 PROCEDURE — 70486 CT MAXILLOFACIAL W/O DYE: CPT

## 2025-08-25 PROCEDURE — 31231 NASAL ENDOSCOPY DX: CPT | Mod: 52 | Performed by: OTOLARYNGOLOGY

## 2025-08-25 RX ORDER — BUDESONIDE 0.5 MG/2ML
INHALANT ORAL
Qty: 120 ML | Refills: 1 | Status: SHIPPED | OUTPATIENT
Start: 2025-08-25

## 2025-08-25 RX ORDER — ONDANSETRON 4 MG/1
TABLET, ORALLY DISINTEGRATING ORAL
Qty: 20 TABLET | Refills: 3 | Status: SHIPPED | OUTPATIENT
Start: 2025-08-25

## 2025-08-25 RX ORDER — SIMVASTATIN 10 MG
10 TABLET ORAL AT BEDTIME
Qty: 90 TABLET | Refills: 1 | Status: SHIPPED | OUTPATIENT
Start: 2025-08-25

## 2025-08-25 ASSESSMENT — PAIN SCALES - GENERAL: PAINLEVEL_OUTOF10: MODERATE PAIN (4)

## 2025-08-26 DIAGNOSIS — R10.13 ABDOMINAL PAIN, EPIGASTRIC: ICD-10-CM

## 2025-08-26 RX ORDER — HYOSCYAMINE SULFATE 0.12 MG/1
0.12 TABLET ORAL EVERY 6 HOURS PRN
Qty: 60 TABLET | Refills: 5 | Status: SHIPPED | OUTPATIENT
Start: 2025-08-26

## 2025-09-04 ENCOUNTER — OFFICE VISIT (OUTPATIENT)
Dept: FAMILY MEDICINE | Facility: CLINIC | Age: 50
End: 2025-09-04
Payer: COMMERCIAL

## 2025-09-04 VITALS
WEIGHT: 164.6 LBS | BODY MASS INDEX: 28.1 KG/M2 | HEIGHT: 64 IN | OXYGEN SATURATION: 96 % | HEART RATE: 104 BPM | TEMPERATURE: 98.4 F | DIASTOLIC BLOOD PRESSURE: 78 MMHG | SYSTOLIC BLOOD PRESSURE: 144 MMHG

## 2025-09-04 DIAGNOSIS — M54.2 CHRONIC NECK PAIN: ICD-10-CM

## 2025-09-04 DIAGNOSIS — G89.29 CHRONIC NECK PAIN: ICD-10-CM

## 2025-09-04 DIAGNOSIS — M26.609 TMJ (TEMPOROMANDIBULAR JOINT SYNDROME): Primary | ICD-10-CM

## 2025-09-04 DIAGNOSIS — R69 TAKING MULTIPLE MEDICATIONS FOR CHRONIC DISEASE: ICD-10-CM

## 2025-09-04 DIAGNOSIS — L65.9 HAIR LOSS: ICD-10-CM

## 2025-09-04 PROBLEM — K11.7 XEROSTOMIA: Status: ACTIVE | Noted: 2025-09-04

## 2025-09-04 PROBLEM — F90.9 ADHD (ATTENTION DEFICIT HYPERACTIVITY DISORDER): Status: ACTIVE | Noted: 2025-04-07

## 2025-09-04 LAB
ALBUMIN SERPL BCG-MCNC: 4.5 G/DL (ref 3.5–5.2)
ALP SERPL-CCNC: 73 U/L (ref 40–150)
ALT SERPL W P-5'-P-CCNC: 19 U/L (ref 0–50)
ANION GAP SERPL CALCULATED.3IONS-SCNC: 13 MMOL/L (ref 7–15)
AST SERPL W P-5'-P-CCNC: 20 U/L (ref 0–45)
BASOPHILS # BLD AUTO: 0.04 10E3/UL (ref 0–0.2)
BASOPHILS NFR BLD AUTO: 0.4 %
BILIRUB SERPL-MCNC: 0.3 MG/DL
BUN SERPL-MCNC: 11.5 MG/DL (ref 6–20)
CALCIUM SERPL-MCNC: 9.9 MG/DL (ref 8.8–10.4)
CHLORIDE SERPL-SCNC: 102 MMOL/L (ref 98–107)
CREAT SERPL-MCNC: 0.72 MG/DL (ref 0.51–0.95)
CRP SERPL-MCNC: 5.42 MG/L
EGFRCR SERPLBLD CKD-EPI 2021: >90 ML/MIN/1.73M2
EOSINOPHIL # BLD AUTO: 0.1 10E3/UL (ref 0–0.7)
EOSINOPHIL NFR BLD AUTO: 1 %
ERYTHROCYTE [DISTWIDTH] IN BLOOD BY AUTOMATED COUNT: 13.5 % (ref 10–15)
FERRITIN SERPL-MCNC: 38 NG/ML (ref 11–328)
FOLATE SERPL-MCNC: >40 NG/ML (ref 4.6–34.8)
GLUCOSE SERPL-MCNC: 98 MG/DL (ref 70–99)
HCO3 SERPL-SCNC: 26 MMOL/L (ref 22–29)
HCT VFR BLD AUTO: 41.1 % (ref 35–47)
HGB BLD-MCNC: 13.8 G/DL (ref 11.7–15.7)
IMM GRANULOCYTES # BLD: 0.03 10E3/UL
IMM GRANULOCYTES NFR BLD: 0.3 %
LYMPHOCYTES # BLD AUTO: 3.08 10E3/UL (ref 0.8–5.3)
LYMPHOCYTES NFR BLD AUTO: 29.8 %
MCH RBC QN AUTO: 29.4 PG (ref 26.5–33)
MCHC RBC AUTO-ENTMCNC: 33.6 G/DL (ref 31.5–36.5)
MCV RBC AUTO: 87.4 FL (ref 78–100)
MONOCYTES # BLD AUTO: 0.58 10E3/UL (ref 0–1.3)
MONOCYTES NFR BLD AUTO: 5.6 %
NEUTROPHILS # BLD AUTO: 6.52 10E3/UL (ref 1.6–8.3)
NEUTROPHILS NFR BLD AUTO: 62.9 %
NRBC # BLD AUTO: <0.03 10E3/UL
NRBC BLD AUTO-RTO: 0 /100
PLATELET # BLD AUTO: 246 10E3/UL (ref 150–450)
POTASSIUM SERPL-SCNC: 4 MMOL/L (ref 3.4–5.3)
PROT SERPL-MCNC: 7.4 G/DL (ref 6.4–8.3)
RBC # BLD AUTO: 4.7 10E6/UL (ref 3.8–5.2)
SODIUM SERPL-SCNC: 141 MMOL/L (ref 135–145)
TSH SERPL DL<=0.005 MIU/L-ACNC: 3.05 UIU/ML (ref 0.3–4.2)
WBC # BLD AUTO: 10.35 10E3/UL (ref 4–11)

## 2025-09-04 RX ORDER — CYCLOBENZAPRINE HCL 5 MG
5-10 TABLET ORAL 2 TIMES DAILY PRN
Qty: 90 TABLET | Refills: 3 | Status: SHIPPED | OUTPATIENT
Start: 2025-09-04

## (undated) DEVICE — SYR 05ML LL W/O NDL

## (undated) DEVICE — SYR 50ML LL W/O NDL 309653

## (undated) DEVICE — DEVICE RUMI II KOH-EFFICIENT 2.5CM KC-RUMI-25

## (undated) DEVICE — CATH TRAY FOLEY 16FR DRAINAGE BAG STATLOCK 899916

## (undated) DEVICE — GLOVE PROTEXIS BLUE W/NEU-THERA 7.5  2D73EB75

## (undated) DEVICE — TUBING SUCTION 6"X3/16" N56A

## (undated) DEVICE — GLOVE PROTEXIS W/NEU-THERA 7.5  2D73TE75

## (undated) DEVICE — GLOVE BIOGEL PI ULTRATOUCH G SZ 7.5 42175

## (undated) DEVICE — LUBRICATING JELLY 4.25OZ

## (undated) DEVICE — PACK BASIC SET-UP 9101

## (undated) DEVICE — SU STRATAFIX PDS PLUS 2-0 SPIRAL SH 30CM SXPP1B416

## (undated) DEVICE — KIT ENDO TURNOVER/PROCEDURE CARRY-ON 101822

## (undated) DEVICE — TRAY EPDRL 3.5IN 17GA 19GA PRFX BPA DEHP 332079

## (undated) DEVICE — PREP CHLORAPREP 26ML TINTED ORANGE  260815

## (undated) DEVICE — PACK AB HYST/LITHOTOMY CUSTOM NORTHLAND

## (undated) DEVICE — SYR 10ML LL W/O NDL

## (undated) DEVICE — DAVINCI XI DRAPE COLUMN 470341

## (undated) DEVICE — SOL NACL 0.9% INJ 1000ML BAG 07983-09

## (undated) DEVICE — GLOVE BIOGEL PI ULTRATOUCH G SZ 6.5 42165

## (undated) DEVICE — TUBING IV EXTENSION SET 34"

## (undated) DEVICE — ESU LIGASURE BLUNT TIP 5MM LF1537

## (undated) DEVICE — ESU GROUND PAD UNIVERSAL W/O CORD

## (undated) DEVICE — SOL ADH LIQUID BENZOIN SWAB 0.6ML C1544

## (undated) DEVICE — TRAY PROCEDURE SUPPORT PAIN MANAGEMENT 332114

## (undated) DEVICE — GOWN IMPERVIOUS BREATHABLE 2XL/XLONG

## (undated) DEVICE — NDL INSUFFLATION 120MM VERRES ULTRA

## (undated) DEVICE — SUCTION MANIFOLD NEPTUNE 2 SYS 4 PORT 0702-020-000

## (undated) DEVICE — DRSG STERI STRIP 1/2X4" R1547

## (undated) DEVICE — BLADE KNIFE SURG 15 371115

## (undated) DEVICE — SOL WATER IRRIG 1000ML BOTTLE 2F7114

## (undated) DEVICE — SUCTION CURETTE 3MM ENDOMETRIAL MX140

## (undated) DEVICE — DRAPE U SPLIT 74X120" 29440

## (undated) DEVICE — PACK GENERAL LAPAOSCOPY

## (undated) DEVICE — SU VICRYL 3-0 SH 27" UND J416H

## (undated) DEVICE — DRSG BANDAID 2X3 1/2" FABRIC

## (undated) DEVICE — ENDO FORCEP ENDOJAW BIOPSY 2.8MMX230CM FB-220U

## (undated) DEVICE — SU MONOCRYL 4-0 PS-2 18" UND Y496G

## (undated) DEVICE — GLOVE PROTEXIS BLUE W/NEU-THERA 8.0  2D73EB80

## (undated) DEVICE — ESU SUCTION/IRRIGATION SYSTEM PISTOL GRIP

## (undated) DEVICE — PREP SKIN SCRUB TRAY 4461A

## (undated) DEVICE — STOCKING SLEEVE COMPRESSION CALF MED

## (undated) DEVICE — DRSG TELFA 3X8" 1238

## (undated) DEVICE — DAVINCI XI SEAL UNIVERSAL 5-12MM 470500

## (undated) DEVICE — NDL INSUFFLATION 120MM VERRES 172015

## (undated) DEVICE — SU STRATAFIX PDS PLUS 0 CT-2 9" SXPP1A446

## (undated) DEVICE — KIT PATIENT POSITIONING PIGAZZI LATEX FREE 40580

## (undated) DEVICE — SUCTION MANIFOLD NEPTUNE 2 SYS 1 PORT 702-025-000

## (undated) DEVICE — NEEDLE SPINAL DISP 22GA X 5" QUINCKE 3333355

## (undated) DEVICE — TUBING EXTENSION SET MICROBORE 21CM LL 6N8374

## (undated) DEVICE — SU DERMABOND PROPEN .5ML DPP6

## (undated) DEVICE — PREP POVIDONE IODINE SCRUB 7.5% 120ML

## (undated) DEVICE — DRAPE POUCH INSTRUMENT 3 POCKET 1018L

## (undated) DEVICE — SU VICRYL 0 CT-1 36" J346H

## (undated) DEVICE — PAD PERI INDIV WRAP 11" 2022A

## (undated) DEVICE — GOWN XLG DISP 9545

## (undated) DEVICE — SYR 50ML SLIP TIP W/O NDL 309654

## (undated) DEVICE — DEVICE SUTURE GRASPER TROCAR CLOSURE 14GA PMITCSG

## (undated) DEVICE — DAVINCI HOT SHEARS TIP COVER  400180

## (undated) DEVICE — LABEL MEDICATION SYSTEM  3304

## (undated) DEVICE — PACK MINOR PROCEDURE CUSTOM

## (undated) DEVICE — ESU HOOK TIP 5MM CONMED

## (undated) DEVICE — SU VICRYL 2-0 SH 27" UND J417H

## (undated) DEVICE — SU DERMABOND ADVANCED .7ML DNX12

## (undated) DEVICE — SYSTEM LAPAROVUE VISIBILITY LAPVUE10

## (undated) DEVICE — UTERINE MANIPULATOR RUMI 5.1MMX6CM UML516

## (undated) DEVICE — DRAPE GYN/UROLOGY FLUID POUCH TUR 29455

## (undated) DEVICE — NDL EPIDURAL TUOHY 20GA 3.5" LF DISP 183A12

## (undated) DEVICE — SYR 10ML PREFILLED 0.9% NACL INJ NOT STERILE 306500

## (undated) DEVICE — TUBING INSUFFLATION W/FILTER 10FT GS1016

## (undated) DEVICE — DAVINCI XI OBTURATOR BLADELESS 8MM 470359

## (undated) DEVICE — GLOVE ESTEEM BLUE W/NEU-THERA 8.0  2D73PB80

## (undated) DEVICE — SYR BULB IRRIG DOVER 60 ML LATEX FREE 67000

## (undated) DEVICE — ENDO CANNULA 05MM VERSAONE UNIVERSAL UNVCA5STF

## (undated) DEVICE — TUBING SUCTION 12"X1/4" N612

## (undated) DEVICE — DRSG BANDAID 1X3" FABRIC

## (undated) DEVICE — GLOVE BIOGEL PI MICRO INDICATOR UNDERGLOVE SZ 6.5 48965

## (undated) DEVICE — NDL ECLIPSE 25GA 1.5"

## (undated) DEVICE — NDL SPINAL 22GA 5" QUINCKE 405148

## (undated) DEVICE — PEN MARKING SKIN

## (undated) DEVICE — ENDO TROCAR BLADELESS 05X100MM B5LT

## (undated) DEVICE — ENDO TROCAR OPTICAL 11MM VERSAPORT PLUS W/FIX CAN ONB11STF

## (undated) DEVICE — ENDO CLIP CARTIRDGE K2 MED/LG 10 1112

## (undated) DEVICE — SUCTION TIP YANKAUER W/O VENT BULBOUS TIP

## (undated) DEVICE — DRSG TEGADERM 2 3/8X2 3/4" 1624W

## (undated) DEVICE — BLADE KNIFE SURG 11 371111

## (undated) DEVICE — ENDO TROCAR FIRST ENTRY KII FIOS Z-THRD 05X100MM CTF03

## (undated) DEVICE — PREP POVIDONE IODINE SOLUTION 10% 120ML

## (undated) DEVICE — SPONGE RAY-TEC 4X4" 7317

## (undated) DEVICE — DAVINCI XI DRAPE ARM 470015

## (undated) DEVICE — BRUSH CYTOLOGY SOFT 8"

## (undated) DEVICE — ENDO TROCAR 05MM VERSAONE BLADED W/STD FIX CANNULA B5STF

## (undated) DEVICE — SU STRATAFIX PDS PLUS 2-0 SPIRAL SH 23CM SXPP1B433

## (undated) DEVICE — SYR 10ML PERFIX LL 332152

## (undated) RX ORDER — ONDANSETRON 2 MG/ML
INJECTION INTRAMUSCULAR; INTRAVENOUS
Status: DISPENSED
Start: 2017-10-30

## (undated) RX ORDER — ONDANSETRON 2 MG/ML
INJECTION INTRAMUSCULAR; INTRAVENOUS
Status: DISPENSED
Start: 2017-08-10

## (undated) RX ORDER — GLYCOPYRROLATE 0.2 MG/ML
INJECTION INTRAMUSCULAR; INTRAVENOUS
Status: DISPENSED
Start: 2023-01-06

## (undated) RX ORDER — NEOSTIGMINE METHYLSULFATE 1 MG/ML
VIAL (ML) INJECTION
Status: DISPENSED
Start: 2018-02-02

## (undated) RX ORDER — PROPOFOL 10 MG/ML
INJECTION, EMULSION INTRAVENOUS
Status: DISPENSED
Start: 2024-08-30

## (undated) RX ORDER — LIDOCAINE HYDROCHLORIDE 10 MG/ML
INJECTION, SOLUTION EPIDURAL; INFILTRATION; INTRACAUDAL; PERINEURAL
Status: DISPENSED
Start: 2024-08-30

## (undated) RX ORDER — ONDANSETRON 2 MG/ML
INJECTION INTRAMUSCULAR; INTRAVENOUS
Status: DISPENSED
Start: 2024-08-30

## (undated) RX ORDER — PROPOFOL 10 MG/ML
INJECTION, EMULSION INTRAVENOUS
Status: DISPENSED
Start: 2023-01-06

## (undated) RX ORDER — LIDOCAINE HYDROCHLORIDE 20 MG/ML
INJECTION, SOLUTION EPIDURAL; INFILTRATION; INTRACAUDAL; PERINEURAL
Status: DISPENSED
Start: 2017-10-30

## (undated) RX ORDER — PROPOFOL 10 MG/ML
INJECTION, EMULSION INTRAVENOUS
Status: DISPENSED
Start: 2017-10-30

## (undated) RX ORDER — PROPOFOL 10 MG/ML
INJECTION, EMULSION INTRAVENOUS
Status: DISPENSED
Start: 2018-02-02

## (undated) RX ORDER — PROPOFOL 10 MG/ML
INJECTION, EMULSION INTRAVENOUS
Status: DISPENSED
Start: 2017-08-10

## (undated) RX ORDER — FENTANYL CITRATE 50 UG/ML
INJECTION, SOLUTION INTRAMUSCULAR; INTRAVENOUS
Status: DISPENSED
Start: 2017-10-30

## (undated) RX ORDER — FENTANYL CITRATE 50 UG/ML
INJECTION, SOLUTION INTRAMUSCULAR; INTRAVENOUS
Status: DISPENSED
Start: 2024-08-30

## (undated) RX ORDER — CEFAZOLIN SODIUM/WATER 2 G/20 ML
SYRINGE (ML) INTRAVENOUS
Status: DISPENSED
Start: 2024-08-30

## (undated) RX ORDER — GLYCOPYRROLATE 0.2 MG/ML
INJECTION INTRAMUSCULAR; INTRAVENOUS
Status: DISPENSED
Start: 2018-02-02

## (undated) RX ORDER — BUPIVACAINE HYDROCHLORIDE 2.5 MG/ML
INJECTION, SOLUTION EPIDURAL; INFILTRATION; INTRACAUDAL
Status: DISPENSED
Start: 2017-10-30

## (undated) RX ORDER — BUPIVACAINE HYDROCHLORIDE 2.5 MG/ML
INJECTION, SOLUTION EPIDURAL; INFILTRATION; INTRACAUDAL
Status: DISPENSED
Start: 2018-02-02

## (undated) RX ORDER — FENTANYL CITRATE 50 UG/ML
INJECTION, SOLUTION INTRAMUSCULAR; INTRAVENOUS
Status: DISPENSED
Start: 2018-02-02

## (undated) RX ORDER — LIDOCAINE HYDROCHLORIDE 20 MG/ML
INJECTION, SOLUTION EPIDURAL; INFILTRATION; INTRACAUDAL; PERINEURAL
Status: DISPENSED
Start: 2018-02-02

## (undated) RX ORDER — LIDOCAINE HYDROCHLORIDE AND EPINEPHRINE 10; 10 MG/ML; UG/ML
INJECTION, SOLUTION INFILTRATION; PERINEURAL
Status: DISPENSED
Start: 2017-10-30

## (undated) RX ORDER — FENTANYL CITRATE 50 UG/ML
INJECTION, SOLUTION INTRAMUSCULAR; INTRAVENOUS
Status: DISPENSED
Start: 2017-08-10

## (undated) RX ORDER — HYDROMORPHONE HYDROCHLORIDE 1 MG/ML
INJECTION, SOLUTION INTRAMUSCULAR; INTRAVENOUS; SUBCUTANEOUS
Status: DISPENSED
Start: 2018-02-02

## (undated) RX ORDER — DEXAMETHASONE SODIUM PHOSPHATE 10 MG/ML
INJECTION, SOLUTION INTRAMUSCULAR; INTRAVENOUS
Status: DISPENSED
Start: 2024-08-30

## (undated) RX ORDER — LIDOCAINE HYDROCHLORIDE 10 MG/ML
INJECTION, SOLUTION EPIDURAL; INFILTRATION; INTRACAUDAL; PERINEURAL
Status: DISPENSED
Start: 2017-10-30

## (undated) RX ORDER — DEXAMETHASONE SODIUM PHOSPHATE 10 MG/ML
INJECTION INTRAMUSCULAR; INTRAVENOUS
Status: DISPENSED
Start: 2017-08-10

## (undated) RX ORDER — LIDOCAINE HYDROCHLORIDE 10 MG/ML
INJECTION, SOLUTION EPIDURAL; INFILTRATION; INTRACAUDAL; PERINEURAL
Status: DISPENSED
Start: 2023-01-06

## (undated) RX ORDER — HYDROMORPHONE HYDROCHLORIDE 1 MG/ML
INJECTION, SOLUTION INTRAMUSCULAR; INTRAVENOUS; SUBCUTANEOUS
Status: DISPENSED
Start: 2024-08-30

## (undated) RX ORDER — HYDROMORPHONE HYDROCHLORIDE 1 MG/ML
INJECTION, SOLUTION INTRAMUSCULAR; INTRAVENOUS; SUBCUTANEOUS
Status: DISPENSED
Start: 2017-10-30

## (undated) RX ORDER — BUPIVACAINE HYDROCHLORIDE 2.5 MG/ML
INJECTION, SOLUTION EPIDURAL; INFILTRATION; INTRACAUDAL
Status: DISPENSED
Start: 2024-08-30

## (undated) RX ORDER — ACETAMINOPHEN 10 MG/ML
INJECTION, SOLUTION INTRAVENOUS
Status: DISPENSED
Start: 2018-02-02

## (undated) RX ORDER — FENTANYL CITRATE 50 UG/ML
INJECTION, SOLUTION INTRAMUSCULAR; INTRAVENOUS
Status: DISPENSED
Start: 2017-04-17

## (undated) RX ORDER — DEXAMETHASONE SODIUM PHOSPHATE 10 MG/ML
INJECTION INTRAMUSCULAR; INTRAVENOUS
Status: DISPENSED
Start: 2017-10-30

## (undated) RX ORDER — EPINEPHRINE 1 MG/ML
INJECTION, SOLUTION INTRAMUSCULAR; SUBCUTANEOUS
Status: DISPENSED
Start: 2024-08-30

## (undated) RX ORDER — EPINEPHRINE 1 MG/ML
INJECTION, SOLUTION, CONCENTRATE INTRAVENOUS
Status: DISPENSED
Start: 2017-10-30